# Patient Record
Sex: MALE | Race: WHITE | NOT HISPANIC OR LATINO | Employment: OTHER | URBAN - NONMETROPOLITAN AREA
[De-identification: names, ages, dates, MRNs, and addresses within clinical notes are randomized per-mention and may not be internally consistent; named-entity substitution may affect disease eponyms.]

---

## 2017-01-19 ENCOUNTER — AMBULATORY - GICH (OUTPATIENT)
Dept: LAB | Facility: OTHER | Age: 62
End: 2017-01-19

## 2017-01-19 DIAGNOSIS — Z94.2 HISTORY OF LUNG TRANSPLANT (H): ICD-10-CM

## 2017-01-19 DIAGNOSIS — Z94.2 LUNG REPLACED BY TRANSPLANT (H): ICD-10-CM

## 2017-01-19 LAB
ANION GAP - HISTORICAL: 11 (ref 5–18)
BUN SERPL-MCNC: 27 MG/DL (ref 7–25)
BUN/CREAT RATIO - HISTORICAL: 26
CALCIUM SERPL-MCNC: 9.5 MG/DL (ref 8.6–10.3)
CHLORIDE SERPLBLD-SCNC: 104 MMOL/L (ref 98–107)
CO2 SERPL-SCNC: 26 MMOL/L (ref 21–31)
CREAT SERPL-MCNC: 1.04 MG/DL (ref 0.7–1.3)
ERYTHROCYTE [DISTWIDTH] IN BLOOD BY AUTOMATED COUNT: 12.9 % (ref 11.5–15.5)
GFR IF NOT AFRICAN AMERICAN - HISTORICAL: >60 ML/MIN/1.73M2
GLUCOSE SERPL-MCNC: 123 MG/DL (ref 70–105)
HCT VFR BLD AUTO: 36.9 % (ref 37–53)
HEMOGLOBIN: 12.6 G/DL (ref 13.5–17.5)
MAGNESIUM SERPL-MCNC: 1.6 MG/DL (ref 1.9–2.7)
MCH RBC QN AUTO: 33.3 PG (ref 26–34)
MCHC RBC AUTO-ENTMCNC: 34.2 G/DL (ref 32–36)
MCV RBC AUTO: 97 FL (ref 80–100)
PLATELET # BLD AUTO: 187 THOU/CU MM (ref 140–440)
PMV BLD: 7.3 FL (ref 6.5–11)
POTASSIUM SERPL-SCNC: 4.2 MMOL/L (ref 3.5–5.1)
RED BLOOD COUNT - HISTORICAL: 3.8 MIL/CU MM (ref 4.3–5.9)
SODIUM SERPL-SCNC: 141 MMOL/L (ref 133–143)
WHITE BLOOD COUNT - HISTORICAL: 3.6 THOU/CU MM (ref 4.5–11)

## 2017-01-19 PROCEDURE — 80197 ASSAY OF TACROLIMUS: CPT | Performed by: INTERNAL MEDICINE

## 2017-01-22 LAB
TACROLIMUS BLD-MCNC: 8.5 UG/L (ref 5–15)
TME LAST DOSE: NORMAL H

## 2017-01-23 NOTE — PROGRESS NOTES
Quick Note:    Dieudonne, the prograf level is 8.5 and is slightly below goal range of 10-12ug/L. Please recheck level in 10 days. Thank you . Jumana  ______

## 2017-01-30 ENCOUNTER — AMBULATORY - GICH (OUTPATIENT)
Dept: LAB | Facility: OTHER | Age: 62
End: 2017-01-30

## 2017-01-30 DIAGNOSIS — Z94.2 HISTORY OF LUNG TRANSPLANT (H): ICD-10-CM

## 2017-01-30 LAB
ERYTHROCYTE [DISTWIDTH] IN BLOOD BY AUTOMATED COUNT: 12.6 % (ref 11.5–15.5)
HCT VFR BLD AUTO: 38 % (ref 37–53)
HEMOGLOBIN: 12.5 G/DL (ref 13.5–17.5)
MCH RBC QN AUTO: 33.2 PG (ref 26–34)
MCHC RBC AUTO-ENTMCNC: 32.8 G/DL (ref 32–36)
MCV RBC AUTO: 101 FL (ref 80–100)
PLATELET # BLD AUTO: 168 THOU/CU MM (ref 140–440)
PMV BLD: 7.3 FL (ref 6.5–11)
RED BLOOD COUNT - HISTORICAL: 3.75 MIL/CU MM (ref 4.3–5.9)
WHITE BLOOD COUNT - HISTORICAL: 3.6 THOU/CU MM (ref 4.5–11)

## 2017-01-30 PROCEDURE — 80197 ASSAY OF TACROLIMUS: CPT | Performed by: PATHOLOGY

## 2017-02-01 LAB
TACROLIMUS BLD-MCNC: 9.5 UG/L (ref 5–15)
TME LAST DOSE: NORMAL H

## 2017-02-02 ENCOUNTER — TELEPHONE (OUTPATIENT)
Dept: TRANSPLANT | Facility: CLINIC | Age: 62
End: 2017-02-02

## 2017-02-02 DIAGNOSIS — Z79.899 ENCOUNTER FOR LONG-TERM (CURRENT) USE OF MEDICATIONS: ICD-10-CM

## 2017-02-02 DIAGNOSIS — Z94.2 LUNG REPLACED BY TRANSPLANT (H): Primary | ICD-10-CM

## 2017-02-02 DIAGNOSIS — Z94.2 LUNG TRANSPLANT STATUS, BILATERAL (H): Primary | ICD-10-CM

## 2017-02-02 RX ORDER — TACROLIMUS 1 MG/1
CAPSULE ORAL
Qty: 180 CAPSULE | Refills: 12 | Status: SHIPPED | OUTPATIENT
Start: 2017-02-02 | End: 2018-04-23

## 2017-02-02 NOTE — TELEPHONE ENCOUNTER
Tacrolimus level 9.5 at 12 hours, on 1/30  Goal 10-12.   Current dose 3 mg in AM, 2.5 mg in PM    Dose changed to  3 mg in AM, 3 mg in PM   Recheck level in 7-10 days    Discussed with Aubrey Urbano message sent

## 2017-02-03 DIAGNOSIS — Z94.2 LUNG REPLACED BY TRANSPLANT (H): Primary | ICD-10-CM

## 2017-02-03 RX ORDER — AZATHIOPRINE 50 MG/1
25 TABLET ORAL DAILY
Qty: 15 TABLET | Refills: 11 | Status: SHIPPED
Start: 2017-02-03 | End: 2017-12-20

## 2017-02-03 NOTE — TELEPHONE ENCOUNTER
Drug Name: azathioprine 50mg  Last Fill Date: 1/11/17  Quantity: 15    Savanah Richey   [facility]  [phone]

## 2017-02-13 ENCOUNTER — AMBULATORY - GICH (OUTPATIENT)
Dept: LAB | Facility: OTHER | Age: 62
End: 2017-02-13

## 2017-02-13 DIAGNOSIS — Z79.899 ENCOUNTER FOR LONG-TERM (CURRENT) USE OF MEDICATIONS: ICD-10-CM

## 2017-02-13 DIAGNOSIS — Z94.2 LUNG REPLACED BY TRANSPLANT (H): ICD-10-CM

## 2017-02-13 DIAGNOSIS — Z94.2 HISTORY OF LUNG TRANSPLANT (H): ICD-10-CM

## 2017-02-13 PROCEDURE — 80197 ASSAY OF TACROLIMUS: CPT | Performed by: INTERNAL MEDICINE

## 2017-02-15 LAB
TACROLIMUS BLD-MCNC: 10.5 UG/L (ref 5–15)
TME LAST DOSE: NORMAL H

## 2017-02-16 NOTE — PROGRESS NOTES
Aubrey, your prograf level is 10.5 and is within goal range.  No dose changes for now.  Call me with questions.  Jumana

## 2017-03-27 DIAGNOSIS — Z94.2 LUNG TRANSPLANT STATUS, BILATERAL (H): ICD-10-CM

## 2017-03-27 RX ORDER — PREDNISONE 5 MG/1
TABLET ORAL
Qty: 45 TABLET | Refills: 12 | Status: SHIPPED | OUTPATIENT
Start: 2017-03-27 | End: 2018-09-11

## 2017-04-05 ASSESSMENT — ENCOUNTER SYMPTOMS
NAUSEA: 0
DIARRHEA: 1
RECTAL BLEEDING: 0
POOR WOUND HEALING: 0
BLOATING: 0
SKIN CHANGES: 0
HEARTBURN: 0
BLOOD IN STOOL: 0
VOMITING: 0
JAUNDICE: 0
NAIL CHANGES: 0
BOWEL INCONTINENCE: 0
CONSTIPATION: 0
RECTAL PAIN: 0
ABDOMINAL PAIN: 0

## 2017-04-19 ENCOUNTER — RESULTS ONLY (OUTPATIENT)
Dept: OTHER | Facility: CLINIC | Age: 62
End: 2017-04-19

## 2017-04-19 ENCOUNTER — OFFICE VISIT (OUTPATIENT)
Dept: DERMATOLOGY | Facility: CLINIC | Age: 62
End: 2017-04-19

## 2017-04-19 ENCOUNTER — OFFICE VISIT (OUTPATIENT)
Dept: PULMONOLOGY | Facility: CLINIC | Age: 62
End: 2017-04-19
Attending: INTERNAL MEDICINE
Payer: MEDICARE

## 2017-04-19 ENCOUNTER — AMBULATORY - GICH (OUTPATIENT)
Dept: SCHEDULING | Facility: OTHER | Age: 62
End: 2017-04-19

## 2017-04-19 VITALS
SYSTOLIC BLOOD PRESSURE: 162 MMHG | OXYGEN SATURATION: 97 % | WEIGHT: 165 LBS | HEIGHT: 68 IN | HEART RATE: 97 BPM | BODY MASS INDEX: 25.01 KG/M2 | DIASTOLIC BLOOD PRESSURE: 90 MMHG

## 2017-04-19 DIAGNOSIS — L82.1 SEBORRHEIC KERATOSIS: ICD-10-CM

## 2017-04-19 DIAGNOSIS — E88.01 ALPHA-1-ANTITRYPSIN DEFICIENCY (H): Primary | ICD-10-CM

## 2017-04-19 DIAGNOSIS — Z94.2 LUNG REPLACED BY TRANSPLANT (H): ICD-10-CM

## 2017-04-19 DIAGNOSIS — Z79.899 ENCOUNTER FOR LONG-TERM (CURRENT) USE OF MEDICATIONS: ICD-10-CM

## 2017-04-19 DIAGNOSIS — L81.8 STASIS PIGMENTATION OF LOWER EXTREMITY: ICD-10-CM

## 2017-04-19 DIAGNOSIS — Z94.2 LUNG REPLACED BY TRANSPLANT (H): Primary | ICD-10-CM

## 2017-04-19 DIAGNOSIS — D22.9 MULTIPLE NEVI: ICD-10-CM

## 2017-04-19 DIAGNOSIS — E83.51 HYPOCALCEMIA: ICD-10-CM

## 2017-04-19 DIAGNOSIS — L57.0 AK (ACTINIC KERATOSIS): Primary | ICD-10-CM

## 2017-04-19 DIAGNOSIS — Z79.52 LONG TERM CURRENT USE OF SYSTEMIC STEROIDS: ICD-10-CM

## 2017-04-19 DIAGNOSIS — Z85.828 HISTORY OF SCC (SQUAMOUS CELL CARCINOMA) OF SKIN: ICD-10-CM

## 2017-04-19 LAB
ANION GAP SERPL CALCULATED.3IONS-SCNC: 7 MMOL/L (ref 3–14)
BASOPHILS # BLD AUTO: 0.1 10E9/L (ref 0–0.2)
BASOPHILS NFR BLD AUTO: 1.8 %
BUN SERPL-MCNC: 28 MG/DL (ref 7–30)
CALCIUM SERPL-MCNC: 8.8 MG/DL (ref 8.5–10.1)
CHLORIDE SERPL-SCNC: 106 MMOL/L (ref 94–109)
CO2 SERPL-SCNC: 29 MMOL/L (ref 20–32)
CREAT SERPL-MCNC: 0.98 MG/DL (ref 0.66–1.25)
DIFFERENTIAL METHOD BLD: ABNORMAL
EOSINOPHIL # BLD AUTO: 0.1 10E9/L (ref 0–0.7)
EOSINOPHIL NFR BLD AUTO: 3.7 %
ERYTHROCYTE [DISTWIDTH] IN BLOOD BY AUTOMATED COUNT: 12.9 % (ref 10–15)
EXPTIME-PRE: 6.93 SEC
FEF2575-%PRED-PRE: 127 %
FEF2575-PRE: 3.36 L/SEC
FEF2575-PRED: 2.64 L/SEC
FEFMAX-%PRED-PRE: 109 %
FEFMAX-PRE: 9.25 L/SEC
FEFMAX-PRED: 8.48 L/SEC
FEV1-%PRED-PRE: 105 %
FEV1-PRE: 3.42 L
FEV1FEV6-PRE: 82 %
FEV1FEV6-PRED: 79 %
FEV1FVC-PRE: 82 %
FEV1FVC-PRED: 77 %
FIFMAX-PRE: 7.29 L/SEC
FVC-%PRED-PRE: 99 %
FVC-PRE: 4.17 L
FVC-PRED: 4.19 L
GFR SERPL CREATININE-BSD FRML MDRD: 77 ML/MIN/1.7M2
GLUCOSE SERPL-MCNC: 155 MG/DL (ref 70–99)
HCT VFR BLD AUTO: 40.5 % (ref 40–53)
HGB BLD-MCNC: 12.9 G/DL (ref 13.3–17.7)
LYMPHOCYTES # BLD AUTO: 1.9 10E9/L (ref 0.8–5.3)
LYMPHOCYTES NFR BLD AUTO: 50.6 %
MAGNESIUM SERPL-MCNC: 1.9 MG/DL (ref 1.6–2.3)
MCH RBC QN AUTO: 32.7 PG (ref 26.5–33)
MCHC RBC AUTO-ENTMCNC: 31.9 G/DL (ref 31.5–36.5)
MCV RBC AUTO: 103 FL (ref 78–100)
METAMYELOCYTES # BLD: 0.1 10E9/L
METAMYELOCYTES NFR BLD MANUAL: 1.8 %
MONOCYTES # BLD AUTO: 0.3 10E9/L (ref 0–1.3)
MONOCYTES NFR BLD AUTO: 7.3 %
MYELOCYTES # BLD: 0 10E9/L
MYELOCYTES NFR BLD MANUAL: 0.9 %
NEUTROPHILS # BLD AUTO: 1.3 10E9/L (ref 1.6–8.3)
NEUTROPHILS NFR BLD AUTO: 33.9 %
PHOSPHATE SERPL-MCNC: 3.3 MG/DL (ref 2.5–4.5)
PLATELET # BLD AUTO: 161 10E9/L (ref 150–450)
POTASSIUM SERPL-SCNC: 4.5 MMOL/L (ref 3.4–5.3)
RBC # BLD AUTO: 3.95 10E12/L (ref 4.4–5.9)
RBC MORPH BLD: NORMAL
SODIUM SERPL-SCNC: 142 MMOL/L (ref 133–144)
TACROLIMUS BLD-MCNC: 9.5 UG/L (ref 5–15)
TME LAST DOSE: NORMAL H
WBC # BLD AUTO: 3.7 10E9/L (ref 4–11)

## 2017-04-19 PROCEDURE — 80048 BASIC METABOLIC PNL TOTAL CA: CPT | Performed by: INTERNAL MEDICINE

## 2017-04-19 PROCEDURE — 84100 ASSAY OF PHOSPHORUS: CPT | Performed by: INTERNAL MEDICINE

## 2017-04-19 PROCEDURE — 80197 ASSAY OF TACROLIMUS: CPT | Performed by: INTERNAL MEDICINE

## 2017-04-19 PROCEDURE — 86833 HLA CLASS II HIGH DEFIN QUAL: CPT | Performed by: INTERNAL MEDICINE

## 2017-04-19 PROCEDURE — 86832 HLA CLASS I HIGH DEFIN QUAL: CPT | Performed by: INTERNAL MEDICINE

## 2017-04-19 PROCEDURE — 85025 COMPLETE CBC W/AUTO DIFF WBC: CPT | Performed by: INTERNAL MEDICINE

## 2017-04-19 PROCEDURE — 36415 COLL VENOUS BLD VENIPUNCTURE: CPT | Performed by: INTERNAL MEDICINE

## 2017-04-19 PROCEDURE — 99211 OFF/OP EST MAY X REQ PHY/QHP: CPT | Mod: ZF

## 2017-04-19 PROCEDURE — 83735 ASSAY OF MAGNESIUM: CPT | Performed by: INTERNAL MEDICINE

## 2017-04-19 ASSESSMENT — PAIN SCALES - GENERAL
PAINLEVEL: NO PAIN (1)
PAINLEVEL: NO PAIN (0)
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
## 2017-04-19 NOTE — PATIENT INSTRUCTIONS
Patient Instructions  1. Continue daily exercise  2. Remember to use sunscreen (SPF 50) with any sun exposure  3. Yearly dermatology check up  4. Call Jumana after antibiotics done if diarrhea has not improved  5. Check on physical therapy for your back tightness    Next transplant clinic appointment: 6 months with CXR, labs and PFTs including annual studies  Next lab draw: 3 months    AVS printed at time of check out    ~~~~~~~~~~~~~~~~~~~~~~~~~    Thoracic Transplant Office phone [phone], fax [phone]  Office Hours 8:30 - 5:00     For after-hours urgent issues, please dial [phone], and ask to speak with the Thoracic Transplant Coordinator  On-Call, pager [phone].  --------------------  To expedite your medication refill(s), please contact your pharmacy and have them fax a refill request to: [phone].   *Please allow 3 business days for routine medication refills.  *Please allow 5 business days for controlled substance medication refills.    **For Diabetic medications and supplies refill(s), please contact your pharmacy and have them  Contact your Endocrine team.  --------------------  For scheduling appointments call [facility] :  [phone]. For lab appointments call [phone] or [facility].  --------------------  Please Note: If you are active on [employer], all future test results will be sent by [employer] message only, and will no longer be called to patient. You may also receive communication directly from your physician.
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
## 2017-04-19 NOTE — NURSING NOTE
Tacro level 9.5 and at goal range and results reviewed with patient.    PFTs down slightly and per Dr. Broussard will have patient obtain volume loop PFT in 3 months. Mailed orders to patient and he is in agreement with plan of care.

## 2017-04-19 NOTE — NURSING NOTE
Chief Complaint   Patient presents with     Derm Problem     Aubrey is here for a skin cancer exam. He states that he does have some concerns across the forehead as the lesions are tender.      Sherita Sutherland, CMA

## 2017-04-19 NOTE — MR AVS SNAPSHOT
After Visit Summary   4/19/2017    Aubrey Duncan    MRN: [id number]           Patient Information     Date Of Birth          1955        Visit Information        Provider Department      4/19/2017 9:20 AM Kirk Broussard MD [facility]        Care Instructions    Patient Instructions  1. Continue daily exercise  2. Remember to use sunscreen (SPF 50) with any sun exposure  3. Yearly dermatology check up  4. Call [facility] after antibiotics done if diarrhea has not improved  5. Check on physical therapy for your back tightness    Next transplant clinic appointment: 6 months with CXR, labs and PFTs including annual studies  Next lab draw: 3 months    AVS printed at time of check out    ~~~~~~~~~~~~~~~~~~~~~~~~~    Thoracic Transplant Office phone [phone], fax [phone]  Office Hours 8:30 - 5:00     For after-hours urgent issues, please dial [phone], and ask to speak with the Thoracic Transplant Coordinator  On-Call, pager [phone].  --------------------  To expedite your medication refill(s), please contact your pharmacy and have them fax a refill request to: [phone].   *Please allow 3 business days for routine medication refills.  *Please allow 5 business days for controlled substance medication refills.    **For Diabetic medications and supplies refill(s), please contact your pharmacy and have them  Contact your Endocrine team.  --------------------  For scheduling appointments call [facility] :  [phone]. For lab appointments call [phone] or [facility].  --------------------  Please Note: If you are active on [employer], all future test results will be sent by [employer] message only, and will no longer be called to patient. You may also receive communication directly from your physician.        Follow-ups after your visit        Who to contact     If you have questions or need follow up information about today's clinic visit or your  "schedule please contact [facility] directly at [phone].  Normal or non-critical lab and imaging results will be communicated to you by [employer], letter or phone within 4 business days after the clinic has received the results. If you do not hear from us within 7 days, please contact the clinic through [employer] or phone. If you have a critical or abnormal lab result, we will notify you by phone as soon as possible.  Submit refill requests through [employer] or call your pharmacy and they will forward the refill request to us. Please allow 3 business days for your refill to be completed.          Additional Information About Your Visit        [employer] Information     [employer] gives you secure access to your electronic health record. If you see a primary care provider, you can also send messages to your care team and make appointments. If you have questions, please call your primary care clinic.  If you do not have a primary care provider, please call [phone] and they will assist you.        Care EveryWhere ID     This is your Care EveryWhere ID. This could be used by other organizations to access your [facility] medical records  [id number]        Your Vitals Were     Pulse Height Pulse Oximetry BMI (Body Mass Index)          97 1.727 m (5' 8\") 97% 25.09 kg/m2         Blood Pressure from Last 3 Encounters:   04/19/17 162/90   10/19/16 155/79   06/30/16 138/72    Weight from Last 3 Encounters:   04/19/17 74.8 kg (165 lb)   10/19/16 78.9 kg (174 lb)   06/14/16 79.2 kg (174 lb 8 oz)              Today, you had the following     No orders found for display         Today's Medication Changes          These changes are accurate as of: 4/19/17 10:18 AM.  If you have any questions, ask your nurse or doctor.               These medicines have changed or have updated prescriptions.        Dose/Directions    predniSONE 5 MG tablet   Commonly known as:  DELTASONE   This may have changed:  " Another medication with the same name was removed. Continue taking this medication, and follow the directions you see here.   Used for:  Lung transplant status, bilateral (H)   Changed by:  Kirk Broussard MD        TAKE ONE TABLET BY MOUTH EVERY MORNING AND TAKE ONE-HALF TABLET BY MOUTH EVERY EVENING   Quantity:  45 tablet   Refills:  12                Primary Care Provider Office Phone # Fax #    Raeann Thomas [phone]       [facility] [address]  [place] [address]        Thank you!     Thank you for choosing [facility]  for your care. Our goal is always to provide you with excellent care. Hearing back from our patients is one way we can continue to improve our services. Please take a few minutes to complete the written survey that you may receive in the mail after your visit with us. Thank you!             Your Updated Medication List - Protect others around you: Learn how to safely use, store and throw away your medicines at www.disposemymeds.org.          This list is accurate as of: 4/19/17 10:18 AM.  Always use your most recent med list.                   Brand Name Dispense Instructions for use    albuterol 108 (90 BASE) MCG/ACT Inhaler    PROAIR HFA/PROVENTIL HFA/VENTOLIN HFA    3 Inhaler    Inhale 2 puffs into the lungs every 6 hours as needed for shortness of breath / dyspnea or wheezing       alendronate 70 MG tablet    FOSAMAX    4 tablet    Take 1 tablet (70 mg) by mouth every 7 days Take with 8 ounces water, at least 60 min before breakfast, and stay upright for at least 30 min after dose.       azaTHIOprine 50 MG tablet    IMURAN    15 tablet    Take 0.5 tablets (25 mg) by mouth daily       CALCIUM 600-D 600-400 MG-UNIT per tablet   Generic drug:  calcium-vitamin D      Take 1 tablet by mouth       calcium-vitamin D 600-400 MG-UNIT per tablet    CALTRATE    60 tablet    Take 1 tablet by mouth 2 times daily (with  meals)       furosemide 20 MG tablet    LASIX    30 tablet    TAKE ONE TABLET BY MOUTH EVERY DAY       lisinopril 2.5 MG tablet    PRINIVIL/Zestril    30 tablet    TAKE ONE TABLET BY MOUTH EVERY DAY       loperamide 2 MG capsule    IMODIUM    120 capsule    Take 1 capsule (2 mg) by mouth 4 times daily as needed for diarrhea       metoprolol 25 MG tablet    LOPRESSOR    60 tablet    TAKE ONE TABLET BY MOUTH TWICE A DAY       multivitamin, therapeutic Tabs tablet     30 tablet    Take 1 tablet by mouth daily       omeprazole 20 MG CR capsule    priLOSEC    60 capsule    Take 1 capsule (20 mg) by mouth 2 times daily (before meals)       predniSONE 5 MG tablet    DELTASONE    45 tablet    TAKE ONE TABLET BY MOUTH EVERY MORNING AND TAKE ONE-HALF TABLET BY MOUTH EVERY EVENING       sildenafil 25 MG cap/tab    REVATIO/VIAGRA    30 tablet    Take 1 tablet (25 mg) by mouth as needed for erectile dysfunction       sulfamethoxazole-trimethoprim 400-80 MG per tablet    BACTRIM/SEPTRA    12 tablet    TAKE ONE TABLET BY MOUTH THREE TIMES A WEEK       tacrolimus 1 MG capsule    PROGRAF - GENERIC EQUIVALENT    180 capsule    Take 3 caps every am and 3 mg every pm.       valGANciclovir 450 MG tablet    VALCYTE    60 tablet    TAKE ONE TABLETS (450MG) BY MOUTH TWO TIMES A DAY
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
## 2017-04-19 NOTE — NURSING NOTE
Chief Complaint   Patient presents with     Follow Up For     Patient here for: post Lung TX- 6 month return visit    Charlotte Goldman at 9:24 AM on 4/19/2017

## 2017-04-19 NOTE — NURSING NOTE
Transplant Coordinator Note    Reason for visit: Post lung transplant follow up visit   Coordinator: Present   Caregiver: none present    Health concerns addressed today:  1. No issues with gout since stopping allopurinal  2. Occasional left leg cramping - treats with spoonful of mustard  3. Labs are stable  4.Left tooth abscess and being followed by dental, are treating with antibiotics    Activity: ad aviva, walks daily  Oxygen needs: room air, PFTs decreased - repeat PFT in 3 months  Health Maintenance: vaccinations are up to date      Labs, CXR, PFTs reviewed with patient  Medication record reviewed and reconciled  Questions and concerns addressed    Patient Instructions  1. Continue daily exercise  2. Remember to use sunscreen (SPF 50) with any sun exposure  3. Yearly dermatology check up  4. Call [facility] after antibiotics done if diarrhea has not improved  5. Check on physical therapy for your back tightness    Next transplant clinic appointment: 6 months with CXR, labs and PFTs including annual studies  Next lab draw: 3 months    AVS printed at time of check out
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
## 2017-04-19 NOTE — MR AVS SNAPSHOT
After Visit Summary   4/19/2017    Aubrey Duncan    MRN: [id number]           Patient Information     Date Of Birth          1955        Visit Information        Provider Department      4/19/2017 10:15 AM Petrona Olmos MD [facility] Dermatologic Surgery        Today's Diagnoses     AK (actinic keratosis)    -  1    History of SCC (squamous cell carcinoma) of skin        Multiple nevi        Seborrheic keratosis        Stasis pigmentation of lower extremity           Follow-ups after your visit        Follow-up notes from your care team     Return in about 6 months (around 10/19/2017).      Your next 10 appointments already scheduled     Oct 19, 2017  8:00 AM CDT   Lab with  LAB   [facility] Lab ([facility])    [address] [place] [id number] [phone]            Oct 19, 2017  8:15 AM CDT   (Arrive by 8:00 AM)   XR CHEST 2 VIEWS with XR1   [facility] Xray ([facility])    [address] [place] [address] [phone]           Please bring a list of your current medicines to your exam. (Include vitamins, minerals and over-thecounter medicines.) Leave your valuables at home.  Tell your doctor if there is a chance you may be pregnant.  You do not need to do anything special for this exam.            Oct 19, 2017  8:30 AM CDT   DX HIP/PELVIS/SPINE with UCDX1   [facility]a ([facility])    [address] [place] [address] [phone]           Please do not take any of the following 48 hours prior to your exam: vitamins, calcium tablets, antacids.            Oct 19, 2017  9:00 AM CDT   SIX MINUTE WALK with  PFL A,  PFL 6 MINUTE WALK 2   [facility] Pulmonary Function Testing ([facility])    [address] [place] [address] [phone]            Oct  19, 2017 10:20 AM CDT   (Arrive by 10:05 AM)   Return Lung Transplant with Kirk Broussard MD   [facility] ([facility])    [address] [address] [phone]              Who to contact     Please call your clinic at [phone] to:    Ask questions about your health    Make or cancel appointments    Discuss your medicines    Learn about your test results    Speak to your doctor   If you have compliments or concerns about an experience at your clinic, or if you wish to file a complaint, please contact [facility] Patient Relations at [phone] or email us at [email or web address]         Additional Information About Your Visit        [employer] Information     [employer] gives you secure access to your electronic health record. If you see a primary care provider, you can also send messages to your care team and make appointments. If you have questions, please call your primary care clinic.  If you do not have a primary care provider, please call [phone] and they will assist you.      [employer] is an electronic gateway that provides easy, online access to your medical records. With [employer], you can request a clinic appointment, read your test results, renew a prescription or communicate with your care team.     To access your existing account, please contact your [facility] or call [phone] for assistance.        Care EveryWhere ID     This is your Care EveryWhere ID. This could be used by other organizations to access your [place] medical records  [id number]         Blood Pressure from Last 3 Encounters:   04/19/17 162/90   10/19/16 155/79   06/30/16 138/72    Weight from Last 3 Encounters:   04/19/17 74.8 kg (165 lb)   10/19/16 78.9 kg (174 lb)   06/14/16 79.2 kg (174 lb 8 oz)              We Performed the Following     DESTRUCT  PREMALIGNANT LESION, 2-14     DESTRUCT PREMALIGNANT LESION, FIRST          Today's Medication Changes          These changes are accurate as of: 4/19/17 11:59 PM.  If you have any questions, ask your nurse or doctor.               These medicines have changed or have updated prescriptions.        Dose/Directions    predniSONE 5 MG tablet   Commonly known as:  DELTASONE   This may have changed:  Another medication with the same name was removed. Continue taking this medication, and follow the directions you see here.   Used for:  Lung transplant status, bilateral (H)   Changed by:  Kirk Broussard MD        TAKE ONE TABLET BY MOUTH EVERY MORNING AND TAKE ONE-HALF TABLET BY MOUTH EVERY EVENING   Quantity:  45 tablet   Refills:  12                Primary Care Provider Office Phone # Fax #    Raeann Thomas [phone]       [facility] [address]  [place] [address]        Thank you!     Thank you for choosing [facility] DERMATOLOGIC SURGERY  for your care. Our goal is always to provide you with excellent care. Hearing back from our patients is one way we can continue to improve our services. Please take a few minutes to complete the written survey that you may receive in the mail after your visit with us. Thank you!             Your Updated Medication List - Protect others around you: Learn how to safely use, store and throw away your medicines at www.disposemymeds.org.          This list is accurate as of: 4/19/17 11:59 PM.  Always use your most recent med list.                   Brand Name Dispense Instructions for use    albuterol 108 (90 BASE) MCG/ACT Inhaler    PROAIR HFA/PROVENTIL HFA/VENTOLIN HFA    3 Inhaler    Inhale 2 puffs into the lungs every 6 hours as needed for shortness of breath / dyspnea or wheezing       alendronate 70 MG tablet    FOSAMAX    4 tablet    Take 1 tablet (70 mg) by mouth every 7 days Take with 8 ounces water, at least 60 min before  breakfast, and stay upright for at least 30 min after dose.       azaTHIOprine 50 MG tablet    IMURAN    15 tablet    Take 0.5 tablets (25 mg) by mouth daily       CALCIUM 600-D 600-400 MG-UNIT per tablet   Generic drug:  calcium-vitamin D      Take 1 tablet by mouth       calcium-vitamin D 600-400 MG-UNIT per tablet    CALTRATE    60 tablet    Take 1 tablet by mouth 2 times daily (with meals)       furosemide 20 MG tablet    LASIX    30 tablet    TAKE ONE TABLET BY MOUTH EVERY DAY       lisinopril 2.5 MG tablet    PRINIVIL/Zestril    30 tablet    TAKE ONE TABLET BY MOUTH EVERY DAY       loperamide 2 MG capsule    IMODIUM    120 capsule    Take 1 capsule (2 mg) by mouth 4 times daily as needed for diarrhea       metoprolol 25 MG tablet    LOPRESSOR    60 tablet    TAKE ONE TABLET BY MOUTH TWICE A DAY       multivitamin, therapeutic Tabs tablet     30 tablet    Take 1 tablet by mouth daily       omeprazole 20 MG CR capsule    priLOSEC    60 capsule    Take 1 capsule (20 mg) by mouth 2 times daily (before meals)       predniSONE 5 MG tablet    DELTASONE    45 tablet    TAKE ONE TABLET BY MOUTH EVERY MORNING AND TAKE ONE-HALF TABLET BY MOUTH EVERY EVENING       sildenafil 25 MG cap/tab    REVATIO/VIAGRA    30 tablet    Take 1 tablet (25 mg) by mouth as needed for erectile dysfunction       sulfamethoxazole-trimethoprim 400-80 MG per tablet    BACTRIM/SEPTRA    12 tablet    TAKE ONE TABLET BY MOUTH THREE TIMES A WEEK       tacrolimus 1 MG capsule    PROGRAF - GENERIC EQUIVALENT    180 capsule    Take 3 caps every am and 3 mg every pm.       valGANciclovir 450 MG tablet    VALCYTE    60 tablet    TAKE ONE TABLETS (450MG) BY MOUTH TWO TIMES A DAY

## 2017-04-20 LAB
CMV DNA SPEC NAA+PROBE-ACNC: NORMAL [IU]/ML
CMV DNA SPEC NAA+PROBE-LOG#: NORMAL {LOG_IU}/ML
PRA DONOR SPECIFIC ABY: NORMAL
SPECIMEN SOURCE: NORMAL

## 2017-04-21 ENCOUNTER — TELEPHONE (OUTPATIENT)
Dept: TRANSPLANT | Facility: CLINIC | Age: 62
End: 2017-04-21

## 2017-04-24 ENCOUNTER — COMMUNICATION - GICH (OUTPATIENT)
Dept: FAMILY MEDICINE | Facility: OTHER | Age: 62
End: 2017-04-24

## 2017-04-24 DIAGNOSIS — K21.9 GASTRO-ESOPHAGEAL REFLUX DISEASE WITHOUT ESOPHAGITIS: ICD-10-CM

## 2017-04-25 LAB
DONOR IDENTIFICATION: NORMAL
DSA COMMENTS: NORMAL
DSA PRESENT: NO
DSA TEST METHOD: NORMAL
ORGAN: NORMAL
SA1 CELL: NORMAL
SA1 COMMENTS: NORMAL
SA1 HI RISK ABY: NORMAL
SA1 MOD RISK ABY: NORMAL
SA1 TEST METHOD: NORMAL
SA2 CELL: NORMAL
SA2 COMMENTS: NORMAL
SA2 HI RISK ABY UA: NORMAL
SA2 MOD RISK ABY: NORMAL
SA2 TEST METHOD: NORMAL

## 2017-05-11 ENCOUNTER — HISTORY (OUTPATIENT)
Dept: PEDIATRICS | Facility: OTHER | Age: 62
End: 2017-05-11

## 2017-05-11 ENCOUNTER — OFFICE VISIT - GICH (OUTPATIENT)
Dept: PEDIATRICS | Facility: OTHER | Age: 62
End: 2017-05-11

## 2017-05-11 DIAGNOSIS — Z76.89 PERSONS ENCOUNTERING HEALTH SERVICES IN OTHER SPECIFIED CIRCUMSTANCES: ICD-10-CM

## 2017-05-11 DIAGNOSIS — Z94.2 HISTORY OF LUNG TRANSPLANT (H): ICD-10-CM

## 2017-05-11 DIAGNOSIS — E78.2 MIXED HYPERLIPIDEMIA: ICD-10-CM

## 2017-05-11 DIAGNOSIS — M85.80 OTHER SPECIFIED DISORDERS OF BONE DENSITY AND STRUCTURE, UNSPECIFIED SITE (CODE): ICD-10-CM

## 2017-05-11 DIAGNOSIS — E88.01 ALPHA-1-ANTITRYPSIN DEFICIENCY (H): ICD-10-CM

## 2017-05-11 DIAGNOSIS — E11.9 TYPE 2 DIABETES MELLITUS WITHOUT COMPLICATIONS (H): ICD-10-CM

## 2017-05-11 DIAGNOSIS — I10 ESSENTIAL (PRIMARY) HYPERTENSION: ICD-10-CM

## 2017-06-08 ENCOUNTER — AMBULATORY - GICH (OUTPATIENT)
Dept: LAB | Facility: OTHER | Age: 62
End: 2017-06-08

## 2017-06-08 DIAGNOSIS — E11.9 TYPE 2 DIABETES MELLITUS WITHOUT COMPLICATIONS (H): ICD-10-CM

## 2017-06-08 DIAGNOSIS — I10 ESSENTIAL (PRIMARY) HYPERTENSION: ICD-10-CM

## 2017-06-08 DIAGNOSIS — E78.2 MIXED HYPERLIPIDEMIA: ICD-10-CM

## 2017-06-08 LAB
ABSOLUTE BASOPHILS - HISTORICAL: 0.1 THOU/CU MM
ABSOLUTE EOSINOPHILS - HISTORICAL: 0.2 THOU/CU MM
ABSOLUTE LYMPHOCYTES - HISTORICAL: 1.9 THOU/CU MM (ref 0.9–2.9)
ABSOLUTE MONOCYTES - HISTORICAL: 0.3 THOU/CU MM
ABSOLUTE NEUTROPHILS - HISTORICAL: 1.1 THOU/CU MM (ref 1.7–7)
BASOPHILS # BLD AUTO: 1.4 %
CHOL/HDL RATIO - HISTORICAL: 5.21
CHOLESTEROL TOTAL: 203 MG/DL
EOSINOPHIL NFR BLD AUTO: 5.1 %
ERYTHROCYTE [DISTWIDTH] IN BLOOD BY AUTOMATED COUNT: 13.1 % (ref 11.5–15.5)
ESTIMATED AVERAGE GLUCOSE: 148 MG/DL
HCT VFR BLD AUTO: 37 % (ref 37–53)
HDLC SERPL-MCNC: 39 MG/DL (ref 23–92)
HEMOGLOBIN A1C MONITORING (POCT) - HISTORICAL: 6.8 % (ref 4–6.2)
HEMOGLOBIN: 12.7 G/DL (ref 13.5–17.5)
LDLC SERPL CALC-MCNC: 94 MG/DL
LYMPHOCYTES NFR BLD AUTO: 52.2 % (ref 20–44)
MCH RBC QN AUTO: 34.5 PG (ref 26–34)
MCHC RBC AUTO-ENTMCNC: 34.3 G/DL (ref 32–36)
MCV RBC AUTO: 101 FL (ref 80–100)
MONOCYTES NFR BLD AUTO: 9 %
NEUTROPHILS NFR BLD AUTO: 31.2 % (ref 42–72)
NON-HDL CHOLESTEROL - HISTORICAL: 164 MG/DL
PATIENT STATUS - HISTORICAL: ABNORMAL
PLATELET # BLD AUTO: 162 THOU/CU MM (ref 140–440)
PMV BLD: 9.8 FL (ref 6.5–11)
RED BLOOD COUNT - HISTORICAL: 3.68 MIL/CU MM (ref 4.3–5.9)
TRIGL SERPL-MCNC: 350 MG/DL
WHITE BLOOD COUNT - HISTORICAL: 3.6 THOU/CU MM (ref 4.5–11)

## 2017-07-10 ENCOUNTER — COMMUNICATION - GICH (OUTPATIENT)
Dept: LAB | Facility: OTHER | Age: 62
End: 2017-07-10

## 2017-07-13 ENCOUNTER — COMMUNICATION - GICH (OUTPATIENT)
Dept: FAMILY MEDICINE | Facility: OTHER | Age: 62
End: 2017-07-13

## 2017-07-13 DIAGNOSIS — K21.9 GASTRO-ESOPHAGEAL REFLUX DISEASE WITHOUT ESOPHAGITIS: ICD-10-CM

## 2017-07-19 ENCOUNTER — AMBULATORY - GICH (OUTPATIENT)
Dept: LAB | Facility: OTHER | Age: 62
End: 2017-07-19

## 2017-07-19 DIAGNOSIS — Z94.2 HISTORY OF LUNG TRANSPLANT (H): ICD-10-CM

## 2017-07-19 DIAGNOSIS — Z79.899 OTHER LONG TERM (CURRENT) DRUG THERAPY: ICD-10-CM

## 2017-07-20 ENCOUNTER — HOSPITAL ENCOUNTER (OUTPATIENT)
Dept: RESPIRATORY THERAPY | Facility: OTHER | Age: 62
End: 2017-07-20

## 2017-07-20 ENCOUNTER — AMBULATORY - GICH (OUTPATIENT)
Dept: LAB | Facility: OTHER | Age: 62
End: 2017-07-20

## 2017-07-20 ENCOUNTER — TRANSFERRED RECORDS (OUTPATIENT)
Dept: HEALTH INFORMATION MANAGEMENT | Facility: CLINIC | Age: 62
End: 2017-07-20

## 2017-07-20 DIAGNOSIS — Z94.2 HISTORY OF LUNG TRANSPLANT (H): ICD-10-CM

## 2017-07-20 DIAGNOSIS — Z79.899 OTHER LONG TERM (CURRENT) DRUG THERAPY: ICD-10-CM

## 2017-07-20 DIAGNOSIS — Z79.899 ENCOUNTER FOR LONG-TERM (CURRENT) USE OF MEDICATIONS: ICD-10-CM

## 2017-07-20 DIAGNOSIS — Z94.2 LUNG REPLACED BY TRANSPLANT (H): ICD-10-CM

## 2017-07-20 LAB
ANION GAP - HISTORICAL: 10 (ref 5–18)
BUN SERPL-MCNC: 31 MG/DL (ref 7–25)
BUN/CREAT RATIO - HISTORICAL: 26
CALCIUM SERPL-MCNC: 9.3 MG/DL (ref 8.6–10.3)
CHLORIDE SERPLBLD-SCNC: 104 MMOL/L (ref 98–107)
CO2 SERPL-SCNC: 25 MMOL/L (ref 21–31)
CREAT SERPL-MCNC: 1.18 MG/DL (ref 0.7–1.3)
ERYTHROCYTE [DISTWIDTH] IN BLOOD BY AUTOMATED COUNT: 13.2 % (ref 11.5–15.5)
GFR IF NOT AFRICAN AMERICAN - HISTORICAL: >60 ML/MIN/1.73M2
GLUCOSE SERPL-MCNC: 135 MG/DL (ref 70–105)
HCT VFR BLD AUTO: 36.1 % (ref 37–53)
HEMOGLOBIN: 12.2 G/DL (ref 13.5–17.5)
MAGNESIUM SERPL-MCNC: 1.7 MG/DL (ref 1.9–2.7)
MCH RBC QN AUTO: 33.9 PG (ref 26–34)
MCHC RBC AUTO-ENTMCNC: 33.8 G/DL (ref 32–36)
MCV RBC AUTO: 100 FL (ref 80–100)
PHOSPHATE SERPL-MCNC: 3.8 MG/DL (ref 2.5–5)
PLATELET # BLD AUTO: 157 THOU/CU MM (ref 140–440)
PMV BLD: 9.6 FL (ref 6.5–11)
POTASSIUM SERPL-SCNC: 4.7 MMOL/L (ref 3.5–5.1)
RED BLOOD COUNT - HISTORICAL: 3.6 MIL/CU MM (ref 4.3–5.9)
SODIUM SERPL-SCNC: 139 MMOL/L (ref 133–143)
WHITE BLOOD COUNT - HISTORICAL: 3 THOU/CU MM (ref 4.5–11)

## 2017-07-20 PROCEDURE — 80197 ASSAY OF TACROLIMUS: CPT | Performed by: INTERNAL MEDICINE

## 2017-07-23 LAB
TACROLIMUS BLD-MCNC: 9.8 UG/L (ref 5–15)
TME LAST DOSE: NORMAL H

## 2017-07-24 NOTE — PROGRESS NOTES
Aubrey, your prograf level is 9.8 and near goal range.  No dose changes.  Call me with questions.  Jumana

## 2017-07-26 ENCOUNTER — TELEPHONE (OUTPATIENT)
Dept: TRANSPLANT | Facility: CLINIC | Age: 62
End: 2017-07-26

## 2017-08-10 ENCOUNTER — TELEPHONE (OUTPATIENT)
Dept: TRANSPLANT | Facility: CLINIC | Age: 62
End: 2017-08-10

## 2017-08-10 NOTE — TELEPHONE ENCOUNTER
Contacted Aubrey about local repeat pft's and that Dr Broussard reviewed them and will recheck them at October clinic visit.  FVC and FEV1 slightly better.  Patient reports feeling well and going for daily walks with wife.    Paperwork sent for scanning.

## 2017-08-17 ENCOUNTER — OFFICE VISIT - GICH (OUTPATIENT)
Dept: FAMILY MEDICINE | Facility: OTHER | Age: 62
End: 2017-08-17

## 2017-08-17 ENCOUNTER — HISTORY (OUTPATIENT)
Dept: FAMILY MEDICINE | Facility: OTHER | Age: 62
End: 2017-08-17

## 2017-08-17 DIAGNOSIS — T14.8XXA OTHER INJURY OF UNSPECIFIED BODY REGION: ICD-10-CM

## 2017-08-17 ASSESSMENT — PATIENT HEALTH QUESTIONNAIRE - PHQ9: SUM OF ALL RESPONSES TO PHQ QUESTIONS 1-9: 0

## 2017-09-06 DIAGNOSIS — Z94.2 LUNG REPLACED BY TRANSPLANT (H): ICD-10-CM

## 2017-09-06 RX ORDER — SULFAMETHOXAZOLE AND TRIMETHOPRIM 400; 80 MG/1; MG/1
TABLET ORAL
Qty: 12 TABLET | Refills: 11 | Status: SHIPPED | OUTPATIENT
Start: 2017-09-06 | End: 2018-09-11

## 2017-09-06 NOTE — TELEPHONE ENCOUNTER
Drug Name: smz/tmp 400-80mg  Last Fill Date: 8/12/17  Quantity: 12    Savanah Richey   [facility]  [phone]

## 2017-09-07 ENCOUNTER — COMMUNICATION - GICH (OUTPATIENT)
Dept: PEDIATRICS | Facility: OTHER | Age: 62
End: 2017-09-07

## 2017-09-12 ENCOUNTER — HISTORY (OUTPATIENT)
Dept: PEDIATRICS | Facility: OTHER | Age: 62
End: 2017-09-12

## 2017-09-12 ENCOUNTER — OFFICE VISIT - GICH (OUTPATIENT)
Dept: PEDIATRICS | Facility: OTHER | Age: 62
End: 2017-09-12

## 2017-09-12 DIAGNOSIS — S80.812D ABRASION, LEFT LOWER LEG, SUBSEQUENT ENCOUNTER: ICD-10-CM

## 2017-09-12 DIAGNOSIS — D89.9 DISORDER INVOLVING IMMUNE MECHANISM (H): ICD-10-CM

## 2017-09-12 DIAGNOSIS — Z94.2 HISTORY OF LUNG TRANSPLANT (H): ICD-10-CM

## 2017-09-12 DIAGNOSIS — Z51.89 ENCOUNTER FOR OTHER SPECIFIED AFTERCARE: ICD-10-CM

## 2017-09-14 ENCOUNTER — TELEPHONE (OUTPATIENT)
Dept: PHARMACY | Facility: CLINIC | Age: 62
End: 2017-09-14

## 2017-09-14 LAB — SPECIMEN DESCRIPTION - HISTORICAL: NORMAL

## 2017-09-19 ENCOUNTER — HISTORY (OUTPATIENT)
Dept: INTERNAL MEDICINE | Facility: OTHER | Age: 62
End: 2017-09-19

## 2017-09-19 ENCOUNTER — TELEPHONE (OUTPATIENT)
Dept: TRANSPLANT | Facility: CLINIC | Age: 62
End: 2017-09-19

## 2017-09-19 ENCOUNTER — COMMUNICATION - GICH (OUTPATIENT)
Dept: INTERNAL MEDICINE | Facility: OTHER | Age: 62
End: 2017-09-19

## 2017-09-19 ENCOUNTER — OFFICE VISIT - GICH (OUTPATIENT)
Dept: INTERNAL MEDICINE | Facility: OTHER | Age: 62
End: 2017-09-19

## 2017-09-19 DIAGNOSIS — Z94.2 HISTORY OF LUNG TRANSPLANT (H): ICD-10-CM

## 2017-09-19 DIAGNOSIS — R60.9 EDEMA: ICD-10-CM

## 2017-09-19 DIAGNOSIS — S80.812D ABRASION, LEFT LOWER LEG, SUBSEQUENT ENCOUNTER: ICD-10-CM

## 2017-09-19 DIAGNOSIS — E11.9 TYPE 2 DIABETES MELLITUS WITHOUT COMPLICATIONS (H): ICD-10-CM

## 2017-09-19 DIAGNOSIS — D89.9 DISORDER INVOLVING IMMUNE MECHANISM (H): ICD-10-CM

## 2017-09-19 NOTE — TELEPHONE ENCOUNTER
Dieudonne calls today after seeing a wound care specialist regarding a 2x2 inch wound that developed after a 2x6 board fell on his leg while adding addition to his garage 8/29/17.  He had seen his PCP who referred him to wound specialist.    Prescribed Santyl debridement cream to use on part of wound that was not healing due to old tissue.  Most of wound has healed and is considered clean.    Encouraged Dieudonne to call with any changes needing our attention.

## 2017-09-24 ENCOUNTER — TELEPHONE (OUTPATIENT)
Dept: PULMONOLOGY | Facility: CLINIC | Age: 62
End: 2017-09-24

## 2017-09-24 NOTE — TELEPHONE ENCOUNTER
Contacted by Kyung, the patients wife, regarding Aubrey's ongoing wound on his leg.  The wound occurred ~8/16.  He has taken a course of Augmentin and seen a wound care nurse in his home town.  He is currently using santyl debridement cream.  Per the patient's wife the wound has not improved at all and is quite tender to touch.  No fevers, of e/o cellulitis.    Advised the patient to follow up with the wound nurse and continue current treatment.  If a second opinion is desired they will travel to the [place].  Kyung was in agreement with this plan.

## 2017-09-26 ENCOUNTER — OFFICE VISIT - GICH (OUTPATIENT)
Dept: INTERNAL MEDICINE | Facility: OTHER | Age: 62
End: 2017-09-26

## 2017-09-26 ENCOUNTER — HISTORY (OUTPATIENT)
Dept: INTERNAL MEDICINE | Facility: OTHER | Age: 62
End: 2017-09-26

## 2017-09-26 DIAGNOSIS — S81.802D UNSPECIFIED OPEN WOUND, LEFT LOWER LEG, SUBSEQUENT ENCOUNTER: ICD-10-CM

## 2017-09-26 DIAGNOSIS — D89.9 DISORDER INVOLVING IMMUNE MECHANISM (H): ICD-10-CM

## 2017-09-26 DIAGNOSIS — Z94.2 HISTORY OF LUNG TRANSPLANT (H): ICD-10-CM

## 2017-09-26 DIAGNOSIS — E11.9 TYPE 2 DIABETES MELLITUS WITHOUT COMPLICATIONS (H): ICD-10-CM

## 2017-09-26 DIAGNOSIS — R60.9 EDEMA: ICD-10-CM

## 2017-10-02 ENCOUNTER — OFFICE VISIT - GICH (OUTPATIENT)
Dept: INTERNAL MEDICINE | Facility: OTHER | Age: 62
End: 2017-10-02

## 2017-10-02 ENCOUNTER — HOSPITAL ENCOUNTER (INPATIENT)
Facility: CLINIC | Age: 62
LOS: 6 days | Discharge: NURSING, REHAB OR LONG-TERM CARE | DRG: 603 | End: 2017-10-09
Attending: EMERGENCY MEDICINE | Admitting: INTERNAL MEDICINE
Payer: MEDICARE

## 2017-10-02 ENCOUNTER — COMMUNICATION - GICH (OUTPATIENT)
Dept: INTERNAL MEDICINE | Facility: OTHER | Age: 62
End: 2017-10-02

## 2017-10-02 ENCOUNTER — HISTORY (OUTPATIENT)
Dept: INTERNAL MEDICINE | Facility: OTHER | Age: 62
End: 2017-10-02

## 2017-10-02 ENCOUNTER — TELEPHONE (OUTPATIENT)
Dept: TRANSPLANT | Facility: CLINIC | Age: 62
End: 2017-10-02

## 2017-10-02 ENCOUNTER — AMBULATORY - GICH (OUTPATIENT)
Dept: SCHEDULING | Facility: OTHER | Age: 62
End: 2017-10-02

## 2017-10-02 ENCOUNTER — TRANSFERRED RECORDS (OUTPATIENT)
Dept: HEALTH INFORMATION MANAGEMENT | Facility: CLINIC | Age: 62
End: 2017-10-02

## 2017-10-02 DIAGNOSIS — L08.9 LOCAL INFECTION OF SKIN AND SUBCUTANEOUS TISSUE: ICD-10-CM

## 2017-10-02 DIAGNOSIS — T14.8XXA WOUND INFECTION: ICD-10-CM

## 2017-10-02 DIAGNOSIS — Z94.2 HISTORY OF LUNG TRANSPLANT (H): ICD-10-CM

## 2017-10-02 DIAGNOSIS — S81.802D UNSPECIFIED OPEN WOUND, LEFT LOWER LEG, SUBSEQUENT ENCOUNTER: ICD-10-CM

## 2017-10-02 DIAGNOSIS — S81.809A OPEN WOUND OF KNEE, LEG, AND ANKLE: ICD-10-CM

## 2017-10-02 DIAGNOSIS — T14.8XXA OTHER INJURY OF UNSPECIFIED BODY REGION, INITIAL ENCOUNTER (CODE): ICD-10-CM

## 2017-10-02 DIAGNOSIS — E87.5 HYPERKALEMIA: ICD-10-CM

## 2017-10-02 DIAGNOSIS — S91.009A OPEN WOUND OF KNEE, LEG, AND ANKLE: ICD-10-CM

## 2017-10-02 DIAGNOSIS — Z51.89 ENCOUNTER FOR OTHER SPECIFIED AFTERCARE: ICD-10-CM

## 2017-10-02 DIAGNOSIS — L08.9 WOUND INFECTION: ICD-10-CM

## 2017-10-02 DIAGNOSIS — S81.009A OPEN WOUND OF KNEE, LEG, AND ANKLE: ICD-10-CM

## 2017-10-02 DIAGNOSIS — D89.9 DISORDER INVOLVING IMMUNE MECHANISM (H): ICD-10-CM

## 2017-10-02 LAB
ABSOLUTE BASOPHILS - HISTORICAL: 0 THOU/CU MM
ABSOLUTE EOSINOPHILS - HISTORICAL: 0.1 THOU/CU MM
ABSOLUTE IMMATURE GRANULOCYTES(METAS,MYELOS,PROS) - HISTORICAL: 0.4 THOU/CU MM
ABSOLUTE LYMPHOCYTES - HISTORICAL: 1.4 THOU/CU MM (ref 0.9–2.9)
ABSOLUTE MONOCYTES - HISTORICAL: 0.3 THOU/CU MM
ABSOLUTE NEUTROPHILS - HISTORICAL: 1.2 THOU/CU MM (ref 1.7–7)
ANION GAP SERPL CALCULATED.3IONS-SCNC: 7 MMOL/L (ref 3–14)
BASOPHILS # BLD AUTO: 0 10E9/L (ref 0–0.2)
BASOPHILS # BLD AUTO: 1.2 %
BASOPHILS NFR BLD AUTO: 0.9 %
BUN SERPL-MCNC: 30 MG/DL (ref 7–30)
C-REACTIVE PROTEIN - HISTORICAL: <1 MG/DL
CALCIUM SERPL-MCNC: 9.2 MG/DL (ref 8.5–10.1)
CHLORIDE SERPL-SCNC: 105 MMOL/L (ref 94–109)
CO2 SERPL-SCNC: 25 MMOL/L (ref 20–32)
CREAT SERPL-MCNC: 1.06 MG/DL (ref 0.66–1.25)
DIFFERENTIAL METHOD BLD: ABNORMAL
EOSINOPHIL # BLD AUTO: 0.1 10E9/L (ref 0–0.7)
EOSINOPHIL NFR BLD AUTO: 1.8 %
EOSINOPHIL NFR BLD AUTO: 4.1 %
ERYTHROCYTE [DISTWIDTH] IN BLOOD BY AUTOMATED COUNT: 13.2 % (ref 11.5–15.5)
ERYTHROCYTE [DISTWIDTH] IN BLOOD BY AUTOMATED COUNT: 13.6 % (ref 10–15)
ERYTHROCYTE [SEDIMENTATION RATE] IN BLOOD: 27 MM/HR
GFR SERPL CREATININE-BSD FRML MDRD: 70 ML/MIN/1.7M2
GLUCOSE SERPL-MCNC: 151 MG/DL (ref 70–99)
GRAM STN SPEC: ABNORMAL
HCT VFR BLD AUTO: 39.5 % (ref 37–53)
HCT VFR BLD AUTO: 41.5 % (ref 40–53)
HEMOGLOBIN: 13.1 G/DL (ref 13.5–17.5)
HGB BLD-MCNC: 13.7 G/DL (ref 13.3–17.7)
IMMATURE GRANULOCYTES(METAS,MYELOS,PROS) - HISTORICAL: 11.7 %
LACTATE BLD-SCNC: 1.6 MMOL/L (ref 0.7–2)
LYMPHOCYTES # BLD AUTO: 1.8 10E9/L (ref 0.8–5.3)
LYMPHOCYTES NFR BLD AUTO: 40.5 % (ref 20–44)
LYMPHOCYTES NFR BLD AUTO: 57 %
Lab: ABNORMAL
MCH RBC QN AUTO: 32.8 PG (ref 26–34)
MCH RBC QN AUTO: 33.7 PG (ref 26.5–33)
MCHC RBC AUTO-ENTMCNC: 33 G/DL (ref 31.5–36.5)
MCHC RBC AUTO-ENTMCNC: 33.2 G/DL (ref 32–36)
MCV RBC AUTO: 102 FL (ref 78–100)
MCV RBC AUTO: 99 FL (ref 80–100)
MONOCYTES # BLD AUTO: 0.2 10E9/L (ref 0–1.3)
MONOCYTES NFR BLD AUTO: 6.1 %
MONOCYTES NFR BLD AUTO: 8.5 %
NEUTROPHILS # BLD AUTO: 1.1 10E9/L (ref 1.6–8.3)
NEUTROPHILS NFR BLD AUTO: 34 % (ref 42–72)
NEUTROPHILS NFR BLD AUTO: 34.2 %
PLATELET # BLD AUTO: 190 THOU/CU MM (ref 140–440)
PLATELET # BLD AUTO: 197 10E9/L (ref 150–450)
PLATELET # BLD EST: ABNORMAL 10*3/UL
PMV BLD: 9.6 FL (ref 6.5–11)
POTASSIUM SERPL-SCNC: 5.7 MMOL/L (ref 3.4–5.3)
RBC # BLD AUTO: 4.07 10E12/L (ref 4.4–5.9)
RBC MORPH BLD: NORMAL
RED BLOOD COUNT - HISTORICAL: 4 MIL/CU MM (ref 4.3–5.9)
SODIUM SERPL-SCNC: 137 MMOL/L (ref 133–144)
SPECIMEN SOURCE: ABNORMAL
WBC # BLD AUTO: 3.2 10E9/L (ref 4–11)
WHITE BLOOD COUNT - HISTORICAL: 3.4 THOU/CU MM (ref 4.5–11)

## 2017-10-02 PROCEDURE — 93010 ELECTROCARDIOGRAM REPORT: CPT | Mod: Z6 | Performed by: EMERGENCY MEDICINE

## 2017-10-02 PROCEDURE — 96361 HYDRATE IV INFUSION ADD-ON: CPT | Performed by: EMERGENCY MEDICINE

## 2017-10-02 PROCEDURE — 25000128 H RX IP 250 OP 636: Performed by: EMERGENCY MEDICINE

## 2017-10-02 PROCEDURE — 83605 ASSAY OF LACTIC ACID: CPT | Performed by: EMERGENCY MEDICINE

## 2017-10-02 PROCEDURE — 80048 BASIC METABOLIC PNL TOTAL CA: CPT | Performed by: EMERGENCY MEDICINE

## 2017-10-02 PROCEDURE — 87077 CULTURE AEROBIC IDENTIFY: CPT | Performed by: EMERGENCY MEDICINE

## 2017-10-02 PROCEDURE — 85025 COMPLETE CBC W/AUTO DIFF WBC: CPT | Performed by: EMERGENCY MEDICINE

## 2017-10-02 PROCEDURE — 87205 SMEAR GRAM STAIN: CPT | Performed by: EMERGENCY MEDICINE

## 2017-10-02 PROCEDURE — 96365 THER/PROPH/DIAG IV INF INIT: CPT | Performed by: EMERGENCY MEDICINE

## 2017-10-02 PROCEDURE — 80197 ASSAY OF TACROLIMUS: CPT | Performed by: EMERGENCY MEDICINE

## 2017-10-02 PROCEDURE — 96375 TX/PRO/DX INJ NEW DRUG ADDON: CPT | Performed by: EMERGENCY MEDICINE

## 2017-10-02 PROCEDURE — 99285 EMERGENCY DEPT VISIT HI MDM: CPT | Mod: 25 | Performed by: EMERGENCY MEDICINE

## 2017-10-02 PROCEDURE — 87070 CULTURE OTHR SPECIMN AEROBIC: CPT | Performed by: EMERGENCY MEDICINE

## 2017-10-02 PROCEDURE — 93005 ELECTROCARDIOGRAM TRACING: CPT | Performed by: EMERGENCY MEDICINE

## 2017-10-02 PROCEDURE — 87186 SC STD MICRODIL/AGAR DIL: CPT | Performed by: EMERGENCY MEDICINE

## 2017-10-02 RX ORDER — FUROSEMIDE 10 MG/ML
20 INJECTION INTRAMUSCULAR; INTRAVENOUS ONCE
Status: COMPLETED | OUTPATIENT
Start: 2017-10-02 | End: 2017-10-02

## 2017-10-02 RX ADMIN — FUROSEMIDE 20 MG: 10 INJECTION, SOLUTION INTRAVENOUS at 23:41

## 2017-10-02 RX ADMIN — SODIUM CHLORIDE 500 ML: 9 INJECTION, SOLUTION INTRAVENOUS at 22:05

## 2017-10-02 NOTE — IP AVS SNAPSHOT
Aubrey Hamlin #[id number] (CSN: [id number])  (62 year old M)  (Adm: 10/02/17)     [id number]               UNIT [address] [place] [address] BANK: [phone]            Patient Demographics     Patient Name Sex          Age SSN Address Phone    Aubrey Hamlin Male 1955 (62 year old) [id number] [address]  [place] [address] [phone] (Home)  [phone] (Mobile) *Preferred*      Emergency Contact(s)     Name Relation Home Work Mobile    MARIA ESTHER HAMLIN Spouse [phone]    Nikole Arroyo Daughter [phone]      Admission Information     Attending Provider Admitting Provider Admission Type Admission Date/Time    Vivian Stewart MD Zewdrachel, Joon NAGEL MD Elective 10/02/17  1637    Discharge Date Hospital Service Auth/Cert Status Service Area     Internal Medicine [facility]    Unit Room/Bed Admission Status       [facility] 5216/5216-01 Admission (Confirmed)       Admission     Complaint    Wound of lower extremity      Hospital Account     Name Acct ID Class Status Primary Coverage    Aubrey Hamlin [id number] Inpatient Open MEDICARE - MEDICARE            Guarantor Account (for Hospital Account #[id number])     Name Relation to Pt Service Area Active? Acct Type    Perla, Aubrey THORPE Self FCS Yes Personal/Family    Address Phone          [address]  [place] [address] [phone](H)              Coverage Information (for Hospital Account #[id number])     1. MEDICARE/MEDICARE     F/O Payor/Plan Precert #    MEDICARE/MEDICARE     Subscriber Subscriber #    Aburey Hamlin MAURILIO [id number]    Address Phone    ATTN CLAIMS  [address]  [place] [address] [phone]          2. [place]     F/O Payor/Plan Precert #    [place]     Subscriber Subscriber #    Perla, Aubrey THORPE [id number]    Address Phone    [address] [address]  [place] [address] [phone]                                                      INTERAGENCY TRANSFER FORM - PHYSICIAN ORDERS  "  10/2/2017                       UNIT [address] [facility]: [phone]            Attending Provider: Vivian Stewart MD     Allergies:  Heparin Cross Reactors, Oxycodone, Fluocinolone, Levaquin, Pneumococcal Vaccine, Sulfa Drugs, Sulfasalazine    Infection:  None   Service:  INTERNAL MED    Ht:  1.702 m (5' 7.01\")   Wt:  74.1 kg (163 lb 6.4 oz)   Admission Wt:  74.8 kg (164 lb 14.5 oz)    BMI:  25.59 kg/m 2   BSA:  1.87 m 2            ED Clinical Impression     Diagnosis Description Comment Added By Time Added    Wound infection [T14.8XXA, L08.9] Wound infection [T14.8XXA, L08.9]  Darwin Richards MD 10/3/2017  1:07 AM    Hyperkalemia [E87.5] Hyperkalemia [E87.5]  Darwin Richards MD 10/3/2017  1:07 AM      Hospital Problems as of 10/9/2017              Priority Class Noted POA    Wound of lower extremity Medium  10/3/2017 Yes      Non-Hospital Problems as of 10/9/2017              Priority Class Noted    Alpha-1-antitrypsin deficiency (H) Medium  Unknown    Heparin-induced thrombocytopenia (H) Medium  9/1/2013    DVT of upper extremity (deep vein thrombosis) (H) Medium  9/1/2013    Lung transplant status, bilateral (H) Medium  9/8/2013    Acute postoperative pain Medium  9/11/2013    Constipation due to pain medication Medium  9/11/2013    Encounter for long-term current use of medication Medium  10/10/2013    Steroid-induced diabetes mellitus (H) Medium  Unknown    CMV infection, acute (H) Medium  10/17/2013    Prophylactic antibiotic Medium  10/17/2013    Wound infection Medium  9/20/2014      Code Status History     Date Active Date Inactive Code Status Order ID Comments User Context    9/22/2014  4:05 PM 10/3/2017  4:46 AM Full Code [id number]  Christi Villegas RN Outpatient    9/20/2014  9:12 PM 9/22/2014  4:05 PM Full Code [id number]  Rojas Meyer MD Inpatient    9/9/2013 12:31 AM 10/4/2013  4:18 PM Full Code [id number]  Yessenia Lazar RN Inpatient      Current Code Status     Date Active Code " Status Order ID Comments User Context       10/3/2017  4:46 AM Full Code [id number]  Abdirizak Duarte MD Inpatient       Summary of Visit     Reason for your hospital stay       You were hospitalized because of non-healing wound in left leg. You required IV antibiotics as this wound was infected. You will need 1 additional week of IV antibiotics.                Medication Review      START taking        Dose / Directions Comments    HYDROmorphone 2 MG tablet   Commonly known as:  DILAUDID   Used for:  Wound infection        Dose:  2 mg   Take 1 tablet (2 mg) by mouth every 6 hours as needed for moderate to severe pain   Quantity:  20 tablet   Refills:  0        order for DME   Used for:  Wound infection        Equipment being ordered: Polymem Non-Adhesive Pad Treatment Diagnosis: Wound infection   Quantity:  30 pad   Refills:  1        piperacillin-tazobactam 3-0.375 GM vial   Commonly known as:  ZOSYN   Indication:  Skin and Soft Tissue Infection   Used for:  Wound infection        Dose:  3.375 g   Inject 3.375 g into the vein every 6 hours for 7 days   Quantity:  40 each   Refills:  0          CONTINUE these medications which may have CHANGED, or have new prescriptions. If we are uncertain of the size of tablets/capsules you have at home, strength may be listed as something that might have changed.        Dose / Directions Comments    tacrolimus 1 MG capsule   Commonly known as:  GENERIC EQUIVALENT   This may have changed:  additional instructions   Used for:  Lung transplant status, bilateral (H)        Take 3 caps every am and 3 mg every pm.   Quantity:  180 capsule   Refills:  12    Dosing change.         CONTINUE these medications which have NOT CHANGED        Dose / Directions Comments    albuterol 108 (90 BASE) MCG/ACT Inhaler   Commonly known as:  PROAIR HFA/PROVENTIL HFA/VENTOLIN HFA   Used for:  Wheezing        Dose:  2 puff   Inhale 2 puffs into the lungs every 6 hours as needed for shortness of breath /  dyspnea or wheezing   Quantity:  3 Inhaler   Refills:  11        alendronate 70 MG tablet   Commonly known as:  FOSAMAX   Used for:  Osteopenia        Dose:  70 mg   Take 1 tablet (70 mg) by mouth every 7 days Take with 8 ounces water, at least 60 min before breakfast, and stay upright for at least 30 min after dose.   Quantity:  4 tablet   Refills:  12        azaTHIOprine 50 MG tablet   Commonly known as:  IMURAN   Used for:  Lung replaced by transplant (H)        Dose:  25 mg   Take 0.5 tablets (25 mg) by mouth daily   Quantity:  15 tablet   Refills:  11        calcium-vitamin D 600-400 MG-UNIT per tablet   Commonly known as:  CALTRATE   Used for:  Lung transplant status, bilateral (H)        Dose:  1 tablet   Take 1 tablet by mouth 2 times daily (with meals)   Quantity:  60 tablet   Refills:  12        furosemide 20 MG tablet   Commonly known as:  LASIX   Used for:  Hypertension        TAKE ONE TABLET BY MOUTH EVERY DAY   Quantity:  30 tablet   Refills:  11        LISINOPRIL PO        Dose:  5 mg   Take 5 mg by mouth daily   Refills:  0        loperamide 2 MG capsule   Commonly known as:  IMODIUM   Used for:  Lung transplant status, bilateral (H)        Dose:  2 mg   Take 1 capsule (2 mg) by mouth 4 times daily as needed for diarrhea   Quantity:  120 capsule   Refills:  12        metoprolol 25 MG tablet   Commonly known as:  LOPRESSOR   Used for:  Hypertension        TAKE ONE TABLET BY MOUTH TWICE A DAY   Quantity:  60 tablet   Refills:  12        multivitamin, therapeutic Tabs tablet   Used for:  Lung transplant status, bilateral (H)        Dose:  1 tablet   Take 1 tablet by mouth daily   Quantity:  30 tablet   Refills:  12        omeprazole 20 MG CR capsule   Commonly known as:  priLOSEC   Used for:  Lung transplant status, bilateral (H)        Dose:  20 mg   Take 1 capsule (20 mg) by mouth 2 times daily (before meals)   Quantity:  60 capsule   Refills:  12        predniSONE 5 MG tablet   Commonly known as:   DELTASONE   Used for:  Lung transplant status, bilateral (H)        TAKE ONE TABLET BY MOUTH EVERY MORNING AND TAKE ONE-HALF TABLET BY MOUTH EVERY EVENING   Quantity:  45 tablet   Refills:  12        sildenafil 25 MG tablet   Commonly known as:  VIAGRA   Used for:  ED (erectile dysfunction)        Dose:  25 mg   Take 1 tablet (25 mg) by mouth as needed for erectile dysfunction   Quantity:  30 tablet   Refills:  0        sulfamethoxazole-trimethoprim 400-80 MG per tablet   Commonly known as:  BACTRIM/SEPTRA   Used for:  Lung replaced by transplant (H)        TAKE ONE TABLET BY MOUTH THREE TIMES A WEEK   Quantity:  12 tablet   Refills:  11        valGANciclovir 450 MG tablet   Commonly known as:  VALCYTE   Used for:  CMV (cytomegalovirus infection) (H), Lung replaced by transplant (H)        TAKE ONE TABLETS (450MG) BY MOUTH TWO TIMES A DAY   Quantity:  60 tablet   Refills:  11                After Care     Activity - Up ad aviva           Diet       Follow this diet upon discharge: Orders Placed This Encounter      Regular Diet Adult       General info for SNF       Length of Stay Estimate: Short Term Care: Estimated # of Days <30  Condition at Discharge: Improving  Level of care:board and care  Rehabilitation Potential: Fair  Admission H&P remains valid and up-to-date: Yes  Recent Chemotherapy: N/A  Use Nursing Home Standing Orders: Yes       Wound care       Site:   Left leg  Instructions:  Continue with daily wound cleaning and dressing changes. LLE wounds:Apply polymem foam dressing over both wounds (no need to cleanse wounds first)  Secure with tape or kerlix roll gauze.  Change daily.               Further instructions from your care team       We have scheduled an appt for you on Thursday 10/12 at 11:00 am with your Primary Care Provider, Dr. David Jiang. Please bring your insurance card, ID, and these discharge papers with you to this appointment. *At the end of this appointment, they will set up your weekly  appointments to manage your wound.*  [facility]  [address] [place] [address]  Phone #: [phone]      Referrals     Medication Therapy Management Referral       Reason for referral:  on more than 5 medications and managing chronic disease and on more than 10 medications and hospitalized or in the ED in the past 6 months     This service is designed to help you get the most from your medications.  A specially trained pharmacist will work closely with you and your doctors  to solve any problems related to your medications and to help you get the   best results from taking them.      The Medication Therapy Management staff will call you to schedule an appointment.             Follow-Up Appointment Instructions     Follow Up and recommended labs and tests       Follow up with Nursing home physician.  No follow up labs or test are needed.  Follow up with primary care provider in 1 weeks.  No follow up labs or test are needed.    Continue to follow with your pulmonary transplant team as you have been doing before admission.             Your next 10 appointments already scheduled     Oct 19, 2017  8:00 AM CDT   Lab with  LAB   [facility] ([facility])    [address] [place] [address] [phone]            Oct 19, 2017  8:15 AM CDT   (Arrive by 8:00 AM)   XR CHEST 2 VIEWS with XR1   [facility] Xray ([facility])    [address] [place] [address] [phone]           Please bring a list of your current medicines to your exam. (Include vitamins, minerals and over-thecounter medicines.) Leave your valuables at home.  Tell your doctor if there is a chance you may be pregnant.  You do not need to do anything special for this exam.            Oct 19, 2017  8:30 AM CDT   DX HIP/PELVIS/SPINE with DX[address] [place] [address] [facility] ([facility])     "[address] [address]   [phone]           Please do not take any of the following 24 hours prior to the day of your exam: vitamins, calcium tablets, antacids.  If possible, please wear clothes without metal (snaps, zippers). A sweatsuit works well.            Oct 19, 2017  9:00 AM CDT   SIX MINUTE WALK with UC PFL A, UC PFL 6 MINUTE WALK 2    [facility] ([facility])    [address] [place] [address]   [phone]            Oct 19, 2017 10:20 AM CDT   (Arrive by 10:05 AM)   Return Lung Transplant with Kirk Broussard MD   [facility] ([facility])    [address] [place] [address]   [phone]            Oct 19, 2017 12:45 PM CDT   (Arrive by 12:30 PM)   Return Visit with Jonny Clifton MD   [facility] Dermatology ([facility])    [address] [place] [address]   [phone]              Statement of Approval     Ordered          10/09/17 1017  I have reviewed and agree with all the recommendations and orders detailed in this document.  EFFECTIVE NOW     Approved and electronically signed by:  Chad Pickens MD                                                 INTERAGENCY TRANSFER FORM - NURSING   10/2/2017                       [address] [facility] BANK: [phone]            Attending Provider: Vivian Stewart MD     Allergies:  Heparin Cross Reactors, Oxycodone, Fluocinolone, Levaquin, Pneumococcal Vaccine, Sulfa Drugs, Sulfasalazine    Infection:  None   Service:  INTERNAL MED    Ht:  1.702 m (5' 7.01\")   Wt:  74.1 kg (163 lb 6.4 oz)   Admission Wt:  74.8 kg (164 lb 14.5 oz)    BMI:  25.59 kg/m 2   BSA:  1.87 m 2            Advance Directives        Does patient have a scanned Advance Directive/ACP document in EPIC?           No        Immunizations     " "Name Date      Influenza (High Dose) 3 valent vaccine 09/26/17     Influenza (High Dose) 3 valent vaccine 10/05/15     Influenza (High Dose) 3 valent vaccine 10/06/14     Influenza (High Dose) 3 valent vaccine 10/24/13     Influenza (IIV3) 09/17/12     Influenza Vaccine IM 3yrs+ 4 Valent IIV4 09/30/16     Influenza Vaccine, 3 YRS +, IM (QUADRIVALENT W/PRESERVATIVES) 02/08/16       ASSESSMENT     Discharge Profile Flowsheet     DISCHARGE NEEDS ASSESSMENT     Inspection under devices  Full 10/08/17 2210    Equipment Used at Home  none 09/22/14 1137   Not Inspected under devices.  -- 10/04/17 1319    GASTROINTESTINAL (ADULT,PEDIATRIC,OB)     Skin WDL  ex 10/09/17 0133    GI WDL  ex 10/09/17 0133   Skin Color/Characteristics  bruised (ecchymotic);karon 10/09/17 0133    Abdominal Appearance  obese 10/07/17 0042   Skin Temperature  warm 10/09/17 0133    All Quadrants Bowel Sounds  audible and normoactive 10/09/17 0133   Skin Moisture  dry 10/09/17 0133    Last Bowel Movement  10/08/17 10/09/17 0133   Skin Elasticity  quick return to original state 10/08/17 1724    GI Signs/Symptoms  diarrhea 10/08/17 2210   Skin Integrity  bruise(s);wound(s) 10/09/17 0133    Passing flatus  yes 10/09/17 0133   Full except areas not inspected   Hip, left;Hip, right;Buttock, left;Buttock, right;Sacrum;Coccyx 10/09/17 0133    COMMUNICATION ASSESSMENT     SAFETY      Patient's communication style  spoken language (English or Bilingual) 10/02/17 1624   Safety WDL  WDL 10/09/17 0136    SKIN     All Alarms  none present 10/09/17 0136    Inspection of bony prominences  Full except (identify areas not inspected) 10/09/17 0133                      Assessment WDL (Within Defined Limits) Definitions           Safety WDL     Effective: 09/28/15    Row Information: <b>WDL Definition:</b> Bed in low position, wheels locked; call light in reach; upper side rails up x 2; ID band on<br> <font color=\"gray\"><i>Item=AS safety wdl>>List=AS safety " "wdl>>Version=F14</i></font>      Skin WDL     Effective: 09/28/15    Row Information: <b>WDL Definition:</b> Warm; dry; intact; elastic; without discoloration; pressure points without redness<br> <font color=\"gray\"><i>Item=AS skin wdl>>List=AS skin wdl>>Version=F14</i></font>      Vitals     Vital Signs Flowsheet     VITAL SIGNS     Sleep  normal sleep 10/09/17 0807    Temp  96.4  F (35.8  C) 10/09/17 0626   ANALGESIA SIDE EFFECTS MONITORING      Temp src  Oral 10/09/17 0626   Side Effects Monitoring: Respiratory Quality  R 10/08/17 2335    Resp  16 10/09/17 0626   Side Effects Monitoring: Respiratory Depth  N 10/08/17 2335    Pulse  62 10/09/17 0626   Side Effects Monitoring: Sedation Level  1 10/08/17 2335    Heart Rate  73 10/07/17 2258   HEIGHT AND WEIGHT      Pulse/Heart Rate Source  Monitor 10/09/17 0626   Height  1.702 m (5' 7.01\") 10/03/17 0421    BP  168/87 10/09/17 0626   Weight  74.1 kg (163 lb 6.4 oz) 10/03/17 0421    BP Location  Right arm 10/09/17 0626   Estimated Weight (From ED)  78.5 kg (173 lb) 10/02/17 1627    OXYGEN THERAPY     BSA (Calculated - sq m)  1.87 10/03/17 0421    SpO2  97 % 10/09/17 0626   BMI (Calculated)  25.64 10/03/17 0421    O2 Device  None (Room air) 10/09/17 0626   EKG MONITORING      PAIN/COMFORT     Cardiac Regularity  Regular 10/02/17 2213    Patient Currently in Pain  yes 10/09/17 0807   Cardiac Rhythm  NSR 10/02/17 2213    Preferred Pain Scale  CAPA (Clinically Aligned Pain Assessment) ([facility], [facility] Adults Only) 10/09/17 0556   YUDY COMA SCALE      Pain Location  Leg 10/09/17 0807   Best Eye Response  4-->(E4) spontaneous 10/06/17 1127    Pain Orientation  Left 10/09/17 0807   Best Motor Response  6-->(M6) obeys commands 10/06/17 1127    Pain Descriptors  Aching 10/09/17 0807   Best Verbal Response  5-->(V5) oriented 10/06/17 1127    Pain Management Interventions  analgesia administered 10/08/17 2158   Yudy Coma Scale Score  15 10/06/17 1127    Pain " Intervention(s)  Medication (See eMAR) 10/09/17 0807   POSITIONING      Response to Interventions  Decrease in pain 10/08/17 2335   Body Position  independently positioning 10/09/17 0832    CLINICALLY ALIGNED PAIN ASSESSMENT (CAPA) ([facility] ADULTS ONLY)     Head of Bed (HOB)  HOB at 30-45 degrees 10/09/17 0832    Comfort  comfortably manageable 10/09/17 0807   Positioning/Transfer Devices  pillows 10/09/17 0832    Change in Pain  getting better 10/09/17 0807   DAILY CARE      Pain Control  partially effective 10/09/17 0807   Activity Management  activity adjusted per tolerance;activity encouraged 10/09/17 0832    Functioning  can do most things, but pain gets in the way of some 10/09/17 0807   Activity Assistance Provided  independent 10/09/17 0832            Patient Lines/Drains/Airways Status    Active LINES/DRAINS/AIRWAYS     Name: Placement date: Placement time: Site: Days: Last dressing change:    Wound 09/11/13 Anterior;Right Chest three former CT sites 09/11/13   1600   Chest   1488     Wound 09/21/14 Right Calf Puncture injury from stick 8/14 09/21/14   0941   Calf   1114     Wound 10/02/17 Left;Lower;Lateral Leg Other (comment) Wound from August 2017 10/02/17   2038   Leg   6     Incision/Surgical Site 09/08/13 Transverse Chest 09/08/13   2149    1491             Patient Lines/Drains/Airways Status    Active PICC/CVC     Name: Placement date: Placement time: Site: Days: Additional Info Last dressing change:    PICC Double Lumen 10/05/17 Left Brachial vein medial 10/05/17   1457   Brachial vein medial   3 External Cath Length (cm): 3 cm            Size (Fr): 5 Fr            Orientation: Left            Extremity Circumference (cm): 26 cm            Catheter Brand: Navilyst, BioFlo            Dressing Intervention: Chlorhexidine patch;Securing device;Transparent;New dressing            Description: Valved;Power PICC            Total Catheter Length (cm) Trimmed: 47 cm            Site Prep:  Chlorhexidine            Local Anesthetic: Injectable            Inserted by: Velvet Dobson RN [facility]            Insertion attempts with ultrasound: 1            Patient Tolerance: Tolerated well            Placement Verification: Blood Return;Flouroscopy            Difficulty with threading line: No            Tip location: SVC            Full barrier precautions done: Yes            Consent Signed: Yes            Time Out performed: Yes            Lot #: [id number]            Use for : Antibiotics. 5A Patient's nurse notified picc is ready to tariq.               Intake/Output Detail Report     Date Intake     Output     Net    Shift P.O. I.V. IV Piggyback Total Urine Emesis/NG output Stool Total       Day 10/08/17 0000 - 10/08/17 0659 -- -- -- -- -- -- -- -- 0    Gissell 10/08/17 0700 - 10/08/17 1459 -- -- -- -- -- -- -- -- 0    Noc 10/08/17 1500 - 10/08/17 2359 360 -- -- 360 -- -- -- -- 360    Day 10/09/17 0000 - 10/09/17 0659 340 100 -- 440 -- -- -- -- 440    Gissell 10/09/17 0700 - 10/09/17 1459 -- -- -- -- -- -- -- -- 0      Last Void/BM       Most Recent Value    Urine Occurrence 1 at 10/09/2017 0500    Stool Occurrence 0 at 10/09/2017 0500      Case Management/Discharge Planning     Case Management/Discharge Planning Flowsheet     LIVING ENVIRONMENT     ABUSE RISK SCREEN      Lives With  spouse 10/03/17 0441   QUESTION TO PATIENT:  Has a member of your family or a partner(now or in the past) intimidated, hurt, manipulated, or controlled you in any way?  no 10/02/17 1628    Living Arrangements  [place] 09/22/14 0951   QUESTION TO PATIENT: Do you feel safe going back to the place where you are living?  yes 10/02/17 1628    COPING/STRESS     OBSERVATION: Is there reason to believe there has been maltreatment of a vulnerable adult (ie. Physical/Sexual/Emotional abuse, self neglect, lack of adequate food, shelter, medical care, or financial exploitation)?  no 10/02/17 1628    Major Change/Loss/Stressor  hospitalization  10/03/17 0441   (R) MENTAL HEALTH SUICIDE RISK      DISCHARGE PLANNING     Are you depressed or being treated for depression?  No 10/03/17 0441    Equipment Used at Home  none 09/22/14 1137   HOMICIDE RISK      MH/BH CAREGIVER     Feels Like Hurting Others  no 10/02/17 1628    Filed Complexity Screen Score  7 10/03/17 0928                       UNIT [facility] [facility] BANK: [phone]            Medication Administration Report for Aubrey Duncan as of 10/09/17 1045   Legend:    Given Hold Not Given Due Canceled Entry Other Actions    Time Time (Time) Time  Time-Action       Inactive    Active    Linked        Medications 10/03/17 10/04/17 10/05/17 10/06/17 10/07/17 10/08/17 10/09/17    albuterol (PROAIR HFA/PROVENTIL HFA/VENTOLIN HFA) Inhaler 2 puff  Dose: 2 puff Freq: EVERY 6 HOURS PRN Route: IN  PRN Reasons: shortness of breath / dyspnea,wheezing  Start: 10/03/17 0446              atorvastatin (LIPITOR) tablet 40 mg  Dose: 40 mg Freq: DAILY Route: PO  Start: 10/05/17 0800      0750 (40 mg)-Given        0827 (40 mg)-Given        0815 (40 mg)-Given        0817 (40 mg)-Given        0800 (40 mg)-Given           azaTHIOprine (IMURAN) half-tab 25 mg  Dose: 25 mg Freq: DAILY Route: PO  Start: 10/03/17 0800    0930 (25 mg)-Given        0744 (25 mg)-Given        0750 (25 mg)-Given        0827 (25 mg)-Given               2011 (25 mg)-Given        2050 (25 mg)-Given        [ ] 2000           calcium-vitamin D (CALTRATE) 600-400 MG-UNIT per tablet 1 tablet  Dose: 1 tablet Freq: DAILY Route: PO  Start: 10/03/17 0800    0928 (1 tablet)-Given        0744 (1 tablet)-Given        0750 (1 tablet)-Given        0827 (1 tablet)-Given        0815 (1 tablet)-Given        0817 (1 tablet)-Given        0800 (1 tablet)-Given           Contraindications to both pharmacological and mechanical prophylaxis (must document contraindications for both in this order)  Freq: CONTINUOUS PRN Route: XX  Start: 10/03/17 0446   Admin Instructions:  Contraindications to both pharmacological and mechanical prophylaxis (must document contraindications for both in this order)               furosemide (LASIX) tablet 20 mg  Dose: 20 mg Freq: DAILY Route: PO  Start: 10/03/17 0800    0929 (20 mg)-Given        0743 (20 mg)-Given        0750 (20 mg)-Given        0827 (20 mg)-Given        0815 (20 mg)-Given        0817 (20 mg)-Given        0800 (20 mg)-Given           HYDROmorphone (DILAUDID) tablet 2 mg  Dose: 2 mg Freq: EVERY 6 HOURS SCHEDULED Route: PO  Start: 10/03/17 0630    0651 (2 mg)-Given       1147 (2 mg)-Given       1954 (2 mg)-Given        0202 (2 mg)-Given       0743 (2 mg)-Given       1428 (2 mg)-Given       2000 (2 mg)-Given        0208 (2 mg)-Given       0750 (2 mg)-Given       1409 (2 mg)-Given       1924 (2 mg)-Given        0159 (2 mg)-Given       0827 (2 mg)-Given       1423 (2 mg)-Given       2019 (2 mg)-Given        0158 (2 mg)-Given       0815 (2 mg)-Given       1409 (2 mg)-Given       2011 (2 mg)-Given        0208 (2 mg)-Given       0817 (2 mg)-Given       1444 (2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 well-appearing male, NAD, lying in bed.  HEENT: anicteric sclera, PERRL, EOMI, MMM, OP unobstructed; no cervical LAD  CV: RRR, nl S1/S2, no m/r/g  Lungs: equal b/l air entry, no wheezing, no crackles, no rhonchi,   Abd: Not examined  Ext: No edema; left leg wound is wrapped and was not examined.  Skin: no rashes, cyanosis, or jaundice  Neuro: non-focal[MH1.1]       Revision History        User Key Date/Time User Provider Type Action    > MH1.1 10/9/2017  8:08 AM Kirk Broussard MD Physician Sign            Progress Notes by Vivian Stewart MD at 10/8/2017 12:42 PM     Author:  Vivian Stewart MD Service:  General Medicine Author Type:  Physician    Filed:  10/8/2017 12:43 PM Date of Service:  10/8/2017 12:42 PM Creation Time:  10/8/2017 12:42 PM    Status:  Signed :  Vivian Stewart MD (Physician)           INTERNAL MEDICINE PROGRESS NOTE    Aubrey Duncan ([id number]) admitted on 10/2/2017  10/08/2017    Assessment & Plan:  This is a 62 year old male with a history of pulmonary transplant in  alpha1-anti trypsin deficiency " who presents with a non-healing wound of the left lower extremity.     #Chronic non-healing left leg wound  #Left leg cellulitis - now improving on zosyn  Onset in August after trauma to the left leg with piece of wood and nail causing two separate wounds in left lower leg.Initially treated at home with wound cares. Did not improve. Patient then went to PCP and was treated with about a week of augmentin. At that time per wife's pictures there was extensive eschar. Wound partially improved with augmentin, but about 4 days before admission he had wound suppuration and foul smelling. On arrival had xray without evidence of gas or bone involvement. Surgery not indicated. Believe wound did not completely healed because of partial antibiotic coverage. Patient initially started on vanc/zosyn. Wound culture grew E.faecalis, E. avium and Pseudomona putida. Both enterococci sensitive to penicillin. Pseudomonas sensitive to quinolones, cetaz, cefepime and  Zosyn. CRP initially elevated but now normal.  - Continue zosyn  - Will need antibiotic for total of 14 days until 10/16/17  - WOC following, wounds look improved  - Blood cultures negative to date    #Hx of Lung Transplant  #Leukopenia  At this moment WBC around his baseline.   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 Denies any new complaints. No pain, fevers, chills.     Objective:  Most recent vital signs:  /74 (BP Location: Right arm)  Pulse 64  Temp 96.7  F (35.9  C) (Oral)  Resp 16  Ht  "1.702 m (5' 7.01\")  Wt 74.1 kg (163 lb 6.4 oz)  SpO2 98%  BMI 25.59 kg/m2  Temp:  [96.7  F (35.9  C)-97.7  F (36.5  C)] 96.7  F (35.9  C)  Pulse:  [64-70] 64  Heart Rate:  [73] 73  Resp:  [16] 16  BP: (123-139)/(74-75) 138/74  SpO2:  [96 %-98 %] 98 %  Wt Readings from Last 2 Encounters:   10/03/17 74.1 kg (163 lb 6.4 oz)   04/19/17 74.8 kg (165 lb)     Intake/Output Summary (Last 24 hours) at 10/04/17 2008  Last data filed at 10/04/17 1859   Gross per 24 hour   Intake             1440 ml   Output                1 ml   Net             1439 ml     Physical exam:  General: Patient lying comfortably in bed, NAD   GI: NABS, NT/ND, no guarding or rebound  Extremities: wound wrapped today, no evidence of active bleed or discharge  Neuro: AOx3, moving all extremities spontaneously    Labs:  No results found for this or any previous visit (from the past 24 hour(s)).[ET1.1]                     Revision History        User Key Date/Time User Provider Type Action    > ET1.1 10/8/2017 12:43 PM Vivian Stewart MD Physician Sign            Progress Notes by Chad Pickens MD at 10/6/2017  6:42 PM     Author:  Chad Pickens MD Service:  General Medicine Author Type:  Resident    Filed:  10/6/2017  6:53 PM Date of Service:  10/6/2017  6:42 PM Creation Time:  10/6/2017  6:49 PM    Status:  Attested :  Chad Pickens MD (Resident)    Cosigner:  Vivian Stewart MD at 10/8/2017  8:30 AM        Attestation signed by Vivian Stewart MD at 10/8/2017  8:30 AM        Physician Attestation  I, Vivian Stewart MD, saw this patient with the resident and agree with the resident s findings and plan of care as documented in the resident s note with my edits.     I personally reviewed vital signs, medications, labs and imaging.    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     #Chronic non-healing left leg wound  #Left leg cellulitis  Onset in August after trauma to the left leg with piece of wood and nail causing two separate wounds in left lower leg.Initially treated at home with wound cares. Did not improve. Patient then went to PCP and was treated with about a week of augmentin. At that time per wife's pictures there was extensive eschar. Wound partially improved with augmentin, but about 4 days before admission he had wound suppuration and foul smelling. On arrival had xray without evidence of gas or bone involvement. Surgery not indicated. Believe wound not resolving because of partial coverage with augmentin. Patient initially started on vanc/zosyn. Wound culture grew E.faecalis, E. avium and Pseudomona putida. Both enterococci sensitive to penicillin. Pseudomonas sensitive to quinolones, cetaz, cefepime and  Zosyn.   - Discontinue vanc  - Continue zosyn per curbdise with ID  - Will need antibiotic for total of 14 days until 10/16/17  - CRP pending  - WOC following, wounds look improved  - Blood cultures negative    #Hx of Lung Transplant  #Leukopenia  At this moment WBC around his baseline.   - Continue PTA tacrolimus, prednisone, azathioprine  - Continue PTA Valganciclovir, bactrim  - Pulmonary transplant consulted  - Monitor WBC     #HTN  - PTA metoprolol, lisinopril     #Chronic Diarrhea  - PTA immodium     Hx of osteoporosis:  - PTA Fosamax      FEN: regular diet  Prophylaxis: DVT mechanical - start lovenox   Disposition: Pending TCU placement  Code Status: FULL    Chad Pickens MD PhD  Internal Medicine PGY2  [phone]    Patient was seen and discussed with  who agrees with above assessment and plan.    ==================================================================    Interval  "HistorySubjective:  No overnight events. Continues to have leg pain, but not worsening.     Objective:  Most recent vital signs:  /85 (BP Location: Right arm)  Pulse 81  Temp 97  F (36.1  C) (Oral)  Resp 16  Ht 1.702 m (5' 7.01\")  Wt 74.1 kg (163 lb 6.4 oz)  SpO2 97%  BMI 25.59 kg/m2  Temp:  [96.1  F (35.6  C)-97.5  F (36.4  C)] 97  F (36.1  C)  Pulse:  [60-81] 81  Heart Rate:  [68] 68  Resp:  [16] 16  BP: (139-152)/(77-85) 139/85  SpO2:  [97 %-99 %] 97 %  Wt Readings from Last 2 Encounters:   10/03/17 74.1 kg (163 lb 6.4 oz)   04/19/17 74.8 kg (165 lb)     Intake/Output Summary (Last 24 hours) at 10/04/17 2008  Last data filed at 10/04/17 1859   Gross per 24 hour   Intake             1440 ml   Output                1 ml   Net             1439 ml     Physical exam:  General: Patient lying comfortably in bed, NAD   HEENT: EOMI, PERRL, no scleral icterus  Cardiac: RRR, no m/r/g appreciated.   Respiratory: CTAB  GI: NABS, NT/ND, no guarding or rebound  Extremities: wound improving, no suppuration, less swelling and tenderness in surrounding area  Skin: No acute lesions appreciated  Neuro: AOx3, moving all extremities spontaneously    Labs:  No results found for this or any previous visit (from the past 24 hour(s)).[RC1.1]             Revision History        User Key Date/Time User Provider Type Action    > RC1.1 10/6/2017  6:53 PM Chad Pickens MD Resident Sign            Progress Notes by Vivian Stewart MD at 10/7/2017  1:36 PM     Author:  Vivian Stewart MD Service:  General Medicine Author Type:  Physician    Filed:  10/8/2017  8:30 AM Date of Service:  10/7/2017  1:36 PM Creation Time:  10/7/2017  1:44 PM    Status:  Signed :  Vivian Stewart MD (Physician)           INTERNAL MEDICINE PROGRESS NOTE    Aubrey Duncan ([id number]) admitted on 10/2/2017  10/07/2017    Assessment & Plan:  This is a 62 year old male with a history 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     #Chronic non-healing left leg wound  #Left leg cellulitis[RC1.1] - now improving on zosyn[ET1.1]  Onset in August after trauma to the left leg with piece of wood and nail causing two separate wounds in left lower leg.Initially treated at home with wound cares. Did not improve. Patient then went to PCP and was treated with about a week of augmentin. At that time per wife's pictures there was extensive eschar. Wound partially improved with augmentin, but about 4 days before admission he had wound suppuration and foul smelling. On arrival had xray without evidence of gas or bone involvement. Surgery not indicated. Believe wound did not completely healed because of partial antibiotic coverage. Patient initially started on vanc/zosyn. Wound culture grew E.faecalis, E. avium and Pseudomona putida. Both enterococci sensitive to penicillin. Pseudomonas sensitive to quinolones, cetaz, cefepime and  Zosyn. CRP initially elevated but now normal.  - Continue zosyn  - Will need antibiotic for total of 14 days until 10/16/17  - WOC following, wounds look improved  - Blood cultures negative to date    #Hx of Lung Transplant  #Leukopenia  At this moment WBC around his baseline.   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    ==================================================================    Interval HistorySubjective:  No overnight events. Continues to have leg pain, but improved.  "He is ambulating around the floor without issues.    Objective:  Most recent vital signs:  /72 (BP Location: Right arm)  Pulse 68  Temp 96.4  F (35.8  C) (Oral)  Resp 16  Ht 1.702 m (5' 7.01\")  Wt 74.1 kg (163 lb 6.4 oz)  SpO2 97%  BMI 25.59 kg/m2  Temp:  [96.4  F (35.8  C)-97.7  F (36.5  C)] 96.4  F (35.8  C)  Pulse:  [67-81] 68  Resp:  [16] 16  BP: (124-139)/(71-85) 132/72  SpO2:  [96 %-97 %] 97 %  Wt Readings from Last 2 Encounters:   10/03/17 74.1 kg (163 lb 6.4 oz)   04/19/17 74.8 kg (165 lb)     Intake/Output Summary (Last 24 hours) at 10/04/17 2008  Last data filed at 10/04/17 1859   Gross per 24 hour   Intake             1440 ml   Output                1 ml   Net             1439 ml     Physical exam:  General: Patient lying comfortably in bed, NAD   HEENT: EOMI, PERRL, no scleral icterus  Cardiac: RRR, no m/r/g appreciated.   Respiratory: CTAB  GI: NABS, NT/ND, no guarding or rebound  Extremities: wound wrapped today, no evidence of active bleed or discharge  Neuro: AOx3, moving all extremities spontaneously    Labs:  Results for orders placed or performed during the hospital encounter of 10/02/17 (from the past 24 hour(s))   CBC with platelets   Result Value Ref Range    WBC 3.1 (L) 4.0 - 11.0 10e9/L    RBC Count 3.68 (L) 4.4 - 5.9 10e12/L    Hemoglobin 12.0 (L) 13.3 - 17.7 g/dL    Hematocrit 37.5 (L) 40.0 - 53.0 %     (H) 78 - 100 fl    MCH 32.6 26.5 - 33.0 pg    MCHC 32.0 31.5 - 36.5 g/dL    RDW 13.5 10.0 - 15.0 %    Platelet Count 158 150 - 450 10e9/L   CRP inflammation   Result Value Ref Range    CRP Inflammation <2.9 0.0 - 8.0 mg/L[RC1.1]       Physician Attestation  I, Vivian Stewart MD, saw this patient with the resident and agree with the resident s findings and plan of care as documented in the resident s note with my edits.     I personally reviewed vital signs, medications, labs and imaging.    Vivian Stewart MD  Date of Service (when I saw the patient): " 10/7/17[ET1.1]                 Revision History        User Key Date/Time User Provider Type Action    > ET1.1 10/8/2017  8:30 AM Vivian Stewart MD Physician Sign     [N/A] 10/7/2017  1:47 PM Chad Pickens MD Resident Sign     RC1.1 10/7/2017  1:47 PM Chad Pickens MD Resident Sign                  Procedure Notes     No notes of this type exist for this encounter.      Progress Notes - Therapies (Notes from 10/06/17 through 10/09/17)     No notes of this type exist for this encounter.                                          INTERAGENCY TRANSFER FORM - LAB / IMAGING / EKG / EMG RESULTS   10/2/2017                       [address] [facility]: [phone]            Unresulted Labs (24h ago through future)    Start       Ordered    10/10/17 0600  Tacrolimus level (AM Draw)  AM DRAW,   Routine      10/09/17 0809         Lab Results - 3 Days      Creatinine [[id number]]  Resulted: 10/09/17 1020, Result status: In process    Ordering provider: Vivian Stewart MD  10/09/17 0949 Resulting lab: [facility]    Specimen Information    Type Source Collected On   Blood  10/09/17 1018            Tacrolimus level [[id number]]  Resulted: 10/09/17 0633, Result status: In process    Ordering provider: Vivian Stewart MD  10/09/17 0000 Resulting lab: [facility]    Specimen Information    Type Source Collected On   Blood  10/09/17 0631            Blood culture [[id number]]  Resulted: 10/09/17 0219, Result status: Final result    Ordering provider: Vivian Stewart MD  10/03/17 1039 Resulting lab: [facility]    Specimen Information    Type Source Collected On   Blood  10/03/17 1105   Comment:  Left Hand          Components       Value Reference Range Flag Lab   Specimen Description Blood Left Hand      Culture Micro No growth   75            Blood culture [[id number]]  Resulted: 10/09/17 0219, Result status: Final result    Ordering provider: Vivian Stewart MD  10/03/17 1039 Resulting lab: [facility]  BANK    Specimen Information    Type Source Collected On   Blood  10/03/17 1105   Comment:  Right Hand          Components       Value Reference Range Flag Lab   Specimen Description Blood Right Hand      Culture Micro No growth   75            CRP inflammation [[id number]]  Resulted: 10/07/17 0802, Result status: Final result    Ordering provider: Chad Pickens MD  10/07/17 0000 Resulting lab: [facility]    Specimen Information    Type Source Collected On   Blood  10/07/17 0724          Components       Value Reference Range Flag Lab   CRP Inflammation <2.9 0.0 - 8.0 mg/L  51            CBC with platelets [[id number]] (Abnormal)  Resulted: 10/07/17 0745, Result status: Final result    Ordering provider: Chad Pickens MD  10/07/17 0000 Resulting lab: [facility]    Specimen Information    Type Source Collected On   Blood  10/07/17 0724          Components       Value Reference Range Flag Lab   WBC 3.1 4.0 - 11.0 10e9/L L 51   RBC Count 3.68 4.4 - 5.9 10e12/L L 51   Hemoglobin 12.0 13.3 - 17.7 g/dL L 51   Hematocrit 37.5 40.0 - 53.0 % L 51    78 - 100 fl H 51   MCH 32.6 26.5 - 33.0 pg  51   MCHC 32.0 31.5 - 36.5 g/dL  51   RDW 13.5 10.0 - 15.0 %  51   Platelet Count 158 150 - 450 10e9/L  51            Testing Performed By     Lab - Abbreviation Name Director Address Valid Date Range    45 - [id number] [facility] Unknown Unknown 01/28/02 0000 - Present    51 - Unknown [facility] Unknown [address] [facility] [id number] 12/31/14 1010 - Present    75 - Unknown [facility] Unknown [address] [facility] [id number] 01/15/15 1019 - Present            Encounter-Level Documents:     There are no encounter-level documents.      Order-Level Documents:     There are no order-level documents.

## 2017-10-02 NOTE — ED NOTES
Bed: IN02  Expected date: 10/2/17  Expected time: 4:00 PM  Means of arrival:   Comments:  Aubrey Duncan  [birth year]  Left leg wound since 8/29, had a wound nurse at home, markedly worse over weekend. Lethargic

## 2017-10-02 NOTE — ED NOTES
Patient referred from [facility]. Patient injured left lower leg in middle of august after a board fell onto his leg. Wound from that injury has not healed, referred here for surgical evaluation and possible debridement. In triage, wound was covered with dressing, no obvious bleeding noted.

## 2017-10-02 NOTE — IP AVS SNAPSHOT
MRN:[id number]                      After Visit Summary   10/2/2017    Aubrey Duncan    MRN: [id number]           Thank you!     Thank you for choosing [facility] for your care. Our goal is always to provide you with excellent care. Hearing back from our patients is one way we can continue to improve our services. Please take a few minutes to complete the written survey that you may receive in the mail after you visit with us. Thank you!        Patient Information     Date Of Birth          1955        Designated Caregiver       Most Recent Value    Caregiver    Will someone help with your care after discharge? yes    Name of designated caregiver Kyung    Phone number of caregiver     Caregiver address [address] [address]      About your hospital stay     You were admitted on:  October 3, 2017 You last received care in the:  [address] [facility]    You were discharged on:  October 9, 2017        Reason for your hospital stay       You were hospitalized because of non-healing wound in left leg. You required IV antibiotics as this wound was infected. You will need 1 additional week of IV antibiotics.                  Who to Call     For medical emergencies, please call 911.  For non-urgent questions about your medical care, please call your primary care provider or clinic, [phone]          Attending Provider     Provider Specialty    Darwin Richards MD Emergency Medicine    [facility]    Joon Duncan MD Internal Medicine    Vivian Stewart MD Internal Medicine       Primary Care Provider Office Phone # Fax #    David Terrell Jiang [phone]      After Care Instructions     Activity - Up ad aviva           Diet       Follow this diet upon discharge: Orders Placed This Encounter      Regular Diet Adult            General info for SNF       Length of Stay Estimate: Short Term Care: Estimated # of Days  <30  Condition at Discharge: Improving  Level of care:board and care  Rehabilitation Potential: Fair  Admission H&P remains valid and up-to-date: Yes  Recent Chemotherapy: N/A  Use Nursing Home Standing Orders: Yes            Wound care       Site:   Left leg  Instructions:  Continue with daily wound cleaning and dressing changes. LLE wounds:Apply polymem foam dressing over both wounds (no need to cleanse wounds first)  Secure with tape or kerlix roll gauze.  Change daily.                  Follow-up Appointments     Follow Up and recommended labs and tests       Follow up with Nursing home physician.  No follow up labs or test are needed.  Follow up with primary care provider in 1 weeks.  No follow up labs or test are needed.    Continue to follow with your pulmonary transplant team as you have been doing before admission.                  Your next 10 appointments already scheduled     Oct 19, 2017  8:00 AM CDT   Lab with  LAB   [facility] ([facility])    [address] [place] [id number] [phone]            Oct 19, 2017  8:15 AM CDT   (Arrive by 8:00 AM)   XR CHEST 2 VIEWS with XR[address] [place] [address] Xray ([facility])    [address] [place] [id number] [phone]           Please bring a list of your current medicines to your exam. (Include vitamins, minerals and over-thecounter medicines.) Leave your valuables at home.  Tell your doctor if there is a chance you may be pregnant.  You do not need to do anything special for this exam.            Oct 19, 2017  8:30 AM CDT   DX HIP/PELVIS/SPINE with DX[address] [place] [address] [facility] ([facility])    [address] [place] [id number] [phone]           Please do not take any of the following 24 hours prior to the day of your exam: vitamins, calcium tablets, antacids.  If possible, please  wear clothes without metal (snaps, zippers). A sweatsuit works well.            Oct 19, 2017  9:00 AM CDT   SIX MINUTE WALK with UC PFL A, UC PFL 6 MINUTE WALK 2   [facility] Pulmonary Function Testing ([facility])    [address] [place] [id number]   [phone]            Oct 19, 2017 10:20 AM CDT   (Arrive by 10:05 AM)   Return Lung Transplant with Kirk Broussard MD   [facility] ([facility])    [address] [place] [id number]   [phone]            Oct 19, 2017 12:45 PM CDT   (Arrive by 12:30 PM)   Return Visit with Jonny Clifton MD   [facility] Dermatology ([facility])    [address] [place] [id number]   [phone]              Additional Services     Medication Therapy Management Referral       Reason for referral:  on more than 5 medications and managing chronic disease and on more than 10 medications and hospitalized or in the ED in the past 6 months     This service is designed to help you get the most from your medications.  A specially trained pharmacist will work closely with you and your doctors  to solve any problems related to your medications and to help you get the   best results from taking them.      The Medication Therapy Management staff will call you to schedule an appointment.                  Further instructions from your care team       We have scheduled an appt for you on Thursday 10/12 at 11:00 am with your Primary Care Provider, Dr. David Jiang. Please bring your insurance card, ID, and these discharge papers with you to this appointment. *At the end of this appointment, they will set up your weekly appointments to manage your wound.*  [facility]  [address] [place] [address]  Phone #: [phone]      Pending Results     Date and Time Order Name Status  "Description    10/9/2017 0949 Creatinine In process     10/9/2017 0000 Tacrolimus level In process     10/5/2017 1344 IR PICC Vascular In process             Statement of Approval     Ordered          10/09/17 1017  I have reviewed and agree with all the recommendations and orders detailed in this document.  EFFECTIVE NOW     Approved and electronically signed by:  Chad Pickens MD             Admission Information     Date & Time Provider Department Dept. Phone    10/2/2017 Vivian Stewart MD [address] [facility] [phone]      Your Vitals Were     Blood Pressure Pulse Temperature Respirations Height Weight    168/87 (BP Location: Right arm) 62 96.4  F (35.8  C) (Oral) 16 1.702 m (5' 7.01\") 74.1 kg (163 lb 6.4 oz)    Pulse Oximetry BMI (Body Mass Index)                97% 25.59 kg/m2          MyChart Information     [employer] gives you secure access to your electronic health record. If you see a primary care provider, you can also send messages to your care team and make appointments. If you have questions, please call your primary care clinic.  If you do not have a primary care provider, please call [phone] and they will assist you.        Care EveryWhere ID     This is your Care EveryWhere ID. This could be used by other organizations to access your [facility] medical records  [id number]        Equal Access to Services     MICHAEL CANTOR AH: Hadii mario Dalton, waaxda luqadaha, qaybta kaalmada valdemar, daniel machado. So [facility] [phone].    ATENCIÓN: Si habla español, tiene a neely disposición servicios gratuitos de asistencia lingüística. Llame al [phone].    We comply with applicable federal civil rights laws and [place] laws. We do not discriminate on the basis of race, color, national origin, age, disability, sex, sexual orientation, or gender identity.               Review of your medicines      START taking        Dose / Directions    HYDROmorphone 2 MG tablet "   Commonly known as:  DILAUDID   Used for:  Wound infection        Dose:  2 mg   Take 1 tablet (2 mg) by mouth every 6 hours as needed for moderate to severe pain   Quantity:  20 tablet   Refills:  0       order for DME   Used for:  Wound infection        Equipment being ordered: Polymem Non-Adhesive Pad Treatment Diagnosis: Wound infection   Quantity:  30 pad   Refills:  1       piperacillin-tazobactam 3-0.375 GM vial   Commonly known as:  ZOSYN   Indication:  Skin and Soft Tissue Infection   Used for:  Wound infection        Dose:  3.375 g   Inject 3.375 g into the vein every 6 hours for 7 days   Quantity:  40 each   Refills:  0         CONTINUE these medicines which may have CHANGED, or have new prescriptions. If we are uncertain of the size of tablets/capsules you have at home, strength may be listed as something that might have changed.        Dose / Directions    tacrolimus 1 MG capsule   Commonly known as:  GENERIC EQUIVALENT   This may have changed:  additional instructions   Used for:  Lung transplant status, bilateral (H)        Take 3 caps every am and 3 mg every pm.   Quantity:  180 capsule   Refills:  12         CONTINUE these medicines which have NOT CHANGED        Dose / Directions    albuterol 108 (90 BASE) MCG/ACT Inhaler   Commonly known as:  PROAIR HFA/PROVENTIL HFA/VENTOLIN HFA   Used for:  Wheezing        Dose:  2 puff   Inhale 2 puffs into the lungs every 6 hours as needed for shortness of breath / dyspnea or wheezing   Quantity:  3 Inhaler   Refills:  11       alendronate 70 MG tablet   Commonly known as:  FOSAMAX   Used for:  Osteopenia        Dose:  70 mg   Take 1 tablet (70 mg) by mouth every 7 days Take with 8 ounces water, at least 60 min before breakfast, and stay upright for at least 30 min after dose.   Quantity:  4 tablet   Refills:  12       azaTHIOprine 50 MG tablet   Commonly known as:  IMURAN   Used for:  Lung replaced by transplant (H)        Dose:  25 mg   Take 0.5 tablets (25  mg) by mouth daily   Quantity:  15 tablet   Refills:  11       calcium-vitamin D 600-400 MG-UNIT per tablet   Commonly known as:  CALTRATE   Used for:  Lung transplant status, bilateral (H)        Dose:  1 tablet   Take 1 tablet by mouth 2 times daily (with meals)   Quantity:  60 tablet   Refills:  12       furosemide 20 MG tablet   Commonly known as:  LASIX   Used for:  Hypertension        TAKE ONE TABLET BY MOUTH EVERY DAY   Quantity:  30 tablet   Refills:  11       LISINOPRIL PO        Dose:  5 mg   Take 5 mg by mouth daily   Refills:  0       loperamide 2 MG capsule   Commonly known as:  IMODIUM   Used for:  Lung transplant status, bilateral (H)        Dose:  2 mg   Take 1 capsule (2 mg) by mouth 4 times daily as needed for diarrhea   Quantity:  120 capsule   Refills:  12       metoprolol 25 MG tablet   Commonly known as:  LOPRESSOR   Used for:  Hypertension        TAKE ONE TABLET BY MOUTH TWICE A DAY   Quantity:  60 tablet   Refills:  12       multivitamin, therapeutic Tabs tablet   Used for:  Lung transplant status, bilateral (H)        Dose:  1 tablet   Take 1 tablet by mouth daily   Quantity:  30 tablet   Refills:  12       omeprazole 20 MG CR capsule   Commonly known as:  priLOSEC   Used for:  Lung transplant status, bilateral (H)        Dose:  20 mg   Take 1 capsule (20 mg) by mouth 2 times daily (before meals)   Quantity:  60 capsule   Refills:  12       predniSONE 5 MG tablet   Commonly known as:  DELTASONE   Used for:  Lung transplant status, bilateral (H)        TAKE ONE TABLET BY MOUTH EVERY MORNING AND TAKE ONE-HALF TABLET BY MOUTH EVERY EVENING   Quantity:  45 tablet   Refills:  12       sildenafil 25 MG tablet   Commonly known as:  VIAGRA   Used for:  ED (erectile dysfunction)        Dose:  25 mg   Take 1 tablet (25 mg) by mouth as needed for erectile dysfunction   Quantity:  30 tablet   Refills:  0       sulfamethoxazole-trimethoprim 400-80 MG per tablet   Commonly known as:  BACTRIM/SEPTRA   Used  for:  Lung replaced by transplant (H)        TAKE ONE TABLET BY MOUTH THREE TIMES A WEEK   Quantity:  12 tablet   Refills:  11       valGANciclovir 450 MG tablet   Commonly known as:  VALCYTE   Used for:  CMV (cytomegalovirus infection) (H), Lung replaced by transplant (H)        TAKE ONE TABLETS (450MG) BY MOUTH TWO TIMES A DAY   Quantity:  60 tablet   Refills:  11            Where to get your medicines      Some of these will need a paper prescription and others can be bought over the counter. Ask your nurse if you have questions.     Bring a paper prescription for each of these medications     HYDROmorphone 2 MG tablet    order for DME       You don't need a prescription for these medications     piperacillin-tazobactam 3-0.375 GM vial               ANTIBIOTIC INSTRUCTION     You've Been Prescribed an Antibiotic - Now What?  Your healthcare team thinks that you or your loved one might have an infection. Some infections can be treated with antibiotics, which are powerful, life-saving drugs. Like all medications, antibiotics have side effects and should only be used when necessary. There are some important things you should know about your antibiotic treatment.      Your healthcare team may run tests before you start taking an antibiotic.    Your team may take samples (e.g., from your blood, urine or other areas) to run tests to look for bacteria. These test can be important to determine if you need an antibiotic at all and, if you do, which antibiotic will work best.      Within a few days, your healthcare team might change or even stop your antibiotic.    Your team may start you on an antibiotic while they are working to find out what is making you sick.    Your team might change your antibiotic because test results show that a different antibiotic would be better to treat your infection.    In some cases, once your team has more information, they learn that you do not need an antibiotic at all. They may find out  that you don't have an infection, or that the antibiotic you're taking won't work against your infection. For example, an infection caused by a virus can't be treated with antibiotics. Staying on an antibiotic when you don't need it is more likely to be harmful than helpful.      You may experience side effects from your antibiotic.    Like all medications, antibiotics have side effects. Some of these can be serious.    Let you healthcare team know if you have any known allergies when you are admitted to the hospital.    One significant side effect of nearly all antibiotics is the risk of severe and sometimes deadly diarrhea caused by Clostridium difficile (C. Difficile). This occurs when a person takes antibiotics because some good germs are destroyed. Antibiotic use allows C. diificile to take over, putting patients at high risk for this serious infection.    As a patient or caregiver, it is important to understand your or your loved one's antibiotic treatment. It is especially important for caregivers to speak up when patients can't speak for themselves. Here are some important questions to ask your healthcare team.    What infection is this antibiotic treating and how do you know I have that infection?    What side effects might occur from this antibiotic?    How long will I need to take this antibiotic?    Is it safe to take this antibiotic with other medications or supplements (e.g., vitamins) that I am taking?     Are there any special directions I need to know about taking this antibiotic? For example, should I take it with food?    How will I be monitored to know whether my infection is responding to the antibiotic?    What tests may help to make sure the right antibiotic is prescribed for me?      Information provided by:  www.cdc.gov/getsmart  U.S. Department of Health and Human Services  Centers for disease Control and Prevention  National Center for Emerging and Zoonotic Infectious Diseases  Division of  Healthcare Quality Promotion         Protect others around you: Learn how to safely use, store and throw away your medicines at www.disposemymeds.org.             Medication List: This is a list of all your medications and when to take them. Check marks below indicate your daily home schedule. Keep this list as a reference.      Medications           Morning Afternoon Evening Bedtime As Needed    albuterol 108 (90 BASE) MCG/ACT Inhaler   Commonly known as:  PROAIR HFA/PROVENTIL HFA/VENTOLIN HFA   Inhale 2 puffs into the lungs every 6 hours as needed for shortness of breath / dyspnea or wheezing                                alendronate 70 MG tablet   Commonly known as:  FOSAMAX   Take 1 tablet (70 mg) by mouth every 7 days Take with 8 ounces water, at least 60 min before breakfast, and stay upright for at least 30 min after dose.                                azaTHIOprine 50 MG tablet   Commonly known as:  IMURAN   Take 0.5 tablets (25 mg) by mouth daily   Last time this was given:  25 mg on 10/8/2017  8:50 PM                                calcium-vitamin D 600-400 MG-UNIT per tablet   Commonly known as:  CALTRATE   Take 1 tablet by mouth 2 times daily (with meals)   Last time this was given:  1 tablet on 10/9/2017  8:00 AM                                furosemide 20 MG tablet   Commonly known as:  LASIX   TAKE ONE TABLET BY MOUTH EVERY DAY   Last time this was given:  20 mg on 10/9/2017  8:00 AM                                HYDROmorphone 2 MG tablet   Commonly known as:  DILAUDID   Take 1 tablet (2 mg) by mouth every 6 hours as needed for moderate to severe pain   Last time this was given:  2 mg on 10/9/2017  8:00 AM                                LISINOPRIL PO   Take 5 mg by mouth daily   Last time this was given:  5 mg on 10/9/2017  8:00 AM                                loperamide 2 MG capsule   Commonly known as:  IMODIUM   Take 1 capsule (2 mg) by mouth 4 times daily as needed for diarrhea   Last time  this was given:  2 mg on 10/9/2017  8:00 AM                                metoprolol 25 MG tablet   Commonly known as:  LOPRESSOR   TAKE ONE TABLET BY MOUTH TWICE A DAY   Last time this was given:  25 mg on 10/9/2017  8:00 AM                                multivitamin, therapeutic Tabs tablet   Take 1 tablet by mouth daily                                omeprazole 20 MG CR capsule   Commonly known as:  priLOSEC   Take 1 capsule (20 mg) by mouth 2 times daily (before meals)   Last time this was given:  20 mg on 10/9/2017  8:00 AM                                order for DME   Equipment being ordered: Polymem Non-Adhesive Pad Treatment Diagnosis: Wound infection                                piperacillin-tazobactam 3-0.375 GM vial   Commonly known as:  ZOSYN   Inject 3.375 g into the vein every 6 hours for 7 days   Last time this was given:  3.375 g on 10/9/2017  8:03 AM                                predniSONE 5 MG tablet   Commonly known as:  DELTASONE   TAKE ONE TABLET BY MOUTH EVERY MORNING AND TAKE ONE-HALF TABLET BY MOUTH EVERY EVENING   Last time this was given:  5 mg on 10/9/2017  8:00 AM                                sildenafil 25 MG tablet   Commonly known as:  VIAGRA   Take 1 tablet (25 mg) by mouth as needed for erectile dysfunction                                sulfamethoxazole-trimethoprim 400-80 MG per tablet   Commonly known as:  BACTRIM/SEPTRA   TAKE ONE TABLET BY MOUTH THREE TIMES A WEEK   Last time this was given:  1 tablet on 10/9/2017  8:00 AM                                tacrolimus 1 MG capsule   Commonly known as:  GENERIC EQUIVALENT   Take 3 caps every am and 3 mg every pm.   Last time this was given:  3 mg on 10/9/2017  8:00 AM                                valGANciclovir 450 MG tablet   Commonly known as:  VALCYTE   TAKE ONE TABLETS (450MG) BY MOUTH TWO TIMES A DAY   Last time this was given:  450 mg on 10/9/2017  9:38 AM

## 2017-10-02 NOTE — IP AVS SNAPSHOT
[address] [address]    Phone:  [phone]                                       After Visit Summary   10/2/2017    Aubrey Duncan    MRN: [id number]           After Visit Summary Signature Page     I have received my discharge instructions, and my questions have been answered. I have discussed any challenges I see with this plan with the nurse or doctor.    ..........................................................................................................................................  Patient/Patient Representative Signature      ..........................................................................................................................................  Patient Representative Print Name and Relationship to Patient    ..................................................               ................................................  Date                                            Time    ..........................................................................................................................................  Reviewed by Signature/Title    ...................................................              ..............................................  Date                                                            Time

## 2017-10-02 NOTE — TELEPHONE ENCOUNTER
"Aubrey's wife Kyung called with concerns for a worsening left leg wound Aubrey has as a result of an injury sustained 8/29 while working on a home improvement.  He originally was seen by PCP who referred Aubrey to a wound specialist.  He had an Augmentin course and was treating wound with santyl debridement creme and regular visits with wound care nurse.  Kyung reports his wound looked \"ok\" on Thursday 9/28 but on 9/29 she noted blackened areas.  She stated with his dressing change today the would was malodorous with an increase in blackened areas within the wound and redness around the wound.  She reports Aubrey's temperature is increased from baseline and is notably more tired.  He has been taking dilaudid and tylenol and reports an increase in wound pain as well.  Given the changes Kyung reports she will be coming the emergency department to have him seen.    "

## 2017-10-03 ENCOUNTER — APPOINTMENT (OUTPATIENT)
Dept: ULTRASOUND IMAGING | Facility: CLINIC | Age: 62
DRG: 603 | End: 2017-10-03
Attending: INTERNAL MEDICINE
Payer: MEDICARE

## 2017-10-03 ENCOUNTER — AMBULATORY - GICH (OUTPATIENT)
Dept: SCHEDULING | Facility: OTHER | Age: 62
End: 2017-10-03

## 2017-10-03 ENCOUNTER — COMMUNICATION - GICH (OUTPATIENT)
Dept: INTERNAL MEDICINE | Facility: OTHER | Age: 62
End: 2017-10-03

## 2017-10-03 ENCOUNTER — APPOINTMENT (OUTPATIENT)
Dept: GENERAL RADIOLOGY | Facility: CLINIC | Age: 62
DRG: 603 | End: 2017-10-03
Attending: EMERGENCY MEDICINE
Payer: MEDICARE

## 2017-10-03 PROBLEM — S81.809A WOUND OF LOWER EXTREMITY: Status: ACTIVE | Noted: 2017-10-03

## 2017-10-03 LAB
CRP SERPL-MCNC: 8.6 MG/L (ref 0–8)
POTASSIUM SERPL-SCNC: 4.7 MMOL/L (ref 3.4–5.3)
TACROLIMUS BLD-MCNC: 10.1 UG/L (ref 5–15)
TME LAST DOSE: NORMAL H

## 2017-10-03 PROCEDURE — 25000128 H RX IP 250 OP 636: Performed by: STUDENT IN AN ORGANIZED HEALTH CARE EDUCATION/TRAINING PROGRAM

## 2017-10-03 PROCEDURE — 93922 UPR/L XTREMITY ART 2 LEVELS: CPT

## 2017-10-03 PROCEDURE — 84132 ASSAY OF SERUM POTASSIUM: CPT | Performed by: EMERGENCY MEDICINE

## 2017-10-03 PROCEDURE — 36415 COLL VENOUS BLD VENIPUNCTURE: CPT | Performed by: INTERNAL MEDICINE

## 2017-10-03 PROCEDURE — 87040 BLOOD CULTURE FOR BACTERIA: CPT | Performed by: INTERNAL MEDICINE

## 2017-10-03 PROCEDURE — 25000132 ZZH RX MED GY IP 250 OP 250 PS 637: Mod: GY | Performed by: STUDENT IN AN ORGANIZED HEALTH CARE EDUCATION/TRAINING PROGRAM

## 2017-10-03 PROCEDURE — 25000128 H RX IP 250 OP 636: Performed by: EMERGENCY MEDICINE

## 2017-10-03 PROCEDURE — 25000128 H RX IP 250 OP 636: Performed by: INTERNAL MEDICINE

## 2017-10-03 PROCEDURE — A9270 NON-COVERED ITEM OR SERVICE: HCPCS | Mod: GY | Performed by: STUDENT IN AN ORGANIZED HEALTH CARE EDUCATION/TRAINING PROGRAM

## 2017-10-03 PROCEDURE — 86140 C-REACTIVE PROTEIN: CPT | Performed by: EMERGENCY MEDICINE

## 2017-10-03 PROCEDURE — 25000131 ZZH RX MED GY IP 250 OP 636 PS 637: Mod: GY | Performed by: STUDENT IN AN ORGANIZED HEALTH CARE EDUCATION/TRAINING PROGRAM

## 2017-10-03 PROCEDURE — 99212 OFFICE O/P EST SF 10 MIN: CPT

## 2017-10-03 PROCEDURE — 25000125 ZZHC RX 250: Performed by: STUDENT IN AN ORGANIZED HEALTH CARE EDUCATION/TRAINING PROGRAM

## 2017-10-03 PROCEDURE — 12000001 ZZH R&B MED SURG/OB UMMC

## 2017-10-03 PROCEDURE — 99223 1ST HOSP IP/OBS HIGH 75: CPT | Mod: AI | Performed by: INTERNAL MEDICINE

## 2017-10-03 PROCEDURE — 73590 X-RAY EXAM OF LOWER LEG: CPT | Mod: LT

## 2017-10-03 RX ORDER — SULFAMETHOXAZOLE AND TRIMETHOPRIM 400; 80 MG/1; MG/1
1 TABLET ORAL
Status: DISCONTINUED | OUTPATIENT
Start: 2017-10-04 | End: 2017-10-09 | Stop reason: HOSPADM

## 2017-10-03 RX ORDER — HYDROMORPHONE HYDROCHLORIDE 2 MG/1
2 TABLET ORAL EVERY 6 HOURS SCHEDULED
Status: DISCONTINUED | OUTPATIENT
Start: 2017-10-03 | End: 2017-10-09 | Stop reason: HOSPADM

## 2017-10-03 RX ORDER — FUROSEMIDE 20 MG
20 TABLET ORAL DAILY
Status: DISCONTINUED | OUTPATIENT
Start: 2017-10-03 | End: 2017-10-09 | Stop reason: HOSPADM

## 2017-10-03 RX ORDER — LOPERAMIDE HCL 2 MG
2 CAPSULE ORAL 4 TIMES DAILY PRN
Status: DISCONTINUED | OUTPATIENT
Start: 2017-10-03 | End: 2017-10-08

## 2017-10-03 RX ORDER — VALGANCICLOVIR 450 MG/1
450 TABLET, FILM COATED ORAL 2 TIMES DAILY
Status: DISCONTINUED | OUTPATIENT
Start: 2017-10-03 | End: 2017-10-09 | Stop reason: HOSPADM

## 2017-10-03 RX ORDER — PREDNISONE 5 MG/1
5 TABLET ORAL 2 TIMES DAILY WITH MEALS
Status: DISCONTINUED | OUTPATIENT
Start: 2017-10-03 | End: 2017-10-09 | Stop reason: HOSPADM

## 2017-10-03 RX ORDER — ALBUTEROL SULFATE 90 UG/1
2 AEROSOL, METERED RESPIRATORY (INHALATION) EVERY 6 HOURS PRN
Status: DISCONTINUED | OUTPATIENT
Start: 2017-10-03 | End: 2017-10-09 | Stop reason: HOSPADM

## 2017-10-03 RX ORDER — TACROLIMUS 1 MG/1
3 CAPSULE ORAL 2 TIMES DAILY
Status: DISCONTINUED | OUTPATIENT
Start: 2017-10-03 | End: 2017-10-09 | Stop reason: HOSPADM

## 2017-10-03 RX ORDER — PIPERACILLIN SODIUM, TAZOBACTAM SODIUM 3; .375 G/15ML; G/15ML
3.38 INJECTION, POWDER, LYOPHILIZED, FOR SOLUTION INTRAVENOUS ONCE
Status: COMPLETED | OUTPATIENT
Start: 2017-10-03 | End: 2017-10-03

## 2017-10-03 RX ORDER — PIPERACILLIN SODIUM, TAZOBACTAM SODIUM 3; .375 G/15ML; G/15ML
3.38 INJECTION, POWDER, LYOPHILIZED, FOR SOLUTION INTRAVENOUS EVERY 6 HOURS
Status: DISCONTINUED | OUTPATIENT
Start: 2017-10-03 | End: 2017-10-09 | Stop reason: HOSPADM

## 2017-10-03 RX ORDER — METOPROLOL TARTRATE 25 MG/1
25 TABLET, FILM COATED ORAL 2 TIMES DAILY
Status: DISCONTINUED | OUTPATIENT
Start: 2017-10-03 | End: 2017-10-09 | Stop reason: HOSPADM

## 2017-10-03 RX ORDER — ALENDRONATE SODIUM 70 MG/1
70 TABLET ORAL WEEKLY
Status: DISCONTINUED | OUTPATIENT
Start: 2017-10-09 | End: 2017-10-03 | Stop reason: CLARIF

## 2017-10-03 RX ORDER — NALOXONE HYDROCHLORIDE 0.4 MG/ML
.1-.4 INJECTION, SOLUTION INTRAMUSCULAR; INTRAVENOUS; SUBCUTANEOUS
Status: DISCONTINUED | OUTPATIENT
Start: 2017-10-03 | End: 2017-10-09 | Stop reason: HOSPADM

## 2017-10-03 RX ORDER — SODIUM CHLORIDE, SODIUM LACTATE, POTASSIUM CHLORIDE, CALCIUM CHLORIDE 600; 310; 30; 20 MG/100ML; MG/100ML; MG/100ML; MG/100ML
INJECTION, SOLUTION INTRAVENOUS CONTINUOUS
Status: ACTIVE | OUTPATIENT
Start: 2017-10-03 | End: 2017-10-03

## 2017-10-03 RX ORDER — LISINOPRIL 2.5 MG/1
2.5 TABLET ORAL DAILY
Status: DISCONTINUED | OUTPATIENT
Start: 2017-10-03 | End: 2017-10-04

## 2017-10-03 RX ADMIN — PIPERACILLIN AND TAZOBACTAM 3.38 G: 3; .375 INJECTION, POWDER, LYOPHILIZED, FOR SOLUTION INTRAVENOUS; PARENTERAL at 02:43

## 2017-10-03 RX ADMIN — HYDROMORPHONE HYDROCHLORIDE 2 MG: 2 TABLET ORAL at 19:54

## 2017-10-03 RX ADMIN — LISINOPRIL 2.5 MG: 2.5 TABLET ORAL at 09:30

## 2017-10-03 RX ADMIN — VANCOMYCIN HYDROCHLORIDE 1250 MG: 10 INJECTION, POWDER, LYOPHILIZED, FOR SOLUTION INTRAVENOUS at 05:11

## 2017-10-03 RX ADMIN — METOPROLOL TARTRATE 25 MG: 25 TABLET, FILM COATED ORAL at 09:30

## 2017-10-03 RX ADMIN — Medication 25 MG: at 09:30

## 2017-10-03 RX ADMIN — METOPROLOL TARTRATE 25 MG: 25 TABLET, FILM COATED ORAL at 19:54

## 2017-10-03 RX ADMIN — VALGANCICLOVIR HYDROCHLORIDE 450 MG: 450 TABLET ORAL at 19:54

## 2017-10-03 RX ADMIN — PREDNISONE 5 MG: 5 TABLET ORAL at 09:28

## 2017-10-03 RX ADMIN — PIPERACILLIN AND TAZOBACTAM 3.38 G: 3; .375 INJECTION, POWDER, LYOPHILIZED, FOR SOLUTION INTRAVENOUS; PARENTERAL at 20:02

## 2017-10-03 RX ADMIN — OMEPRAZOLE 20 MG: 20 CAPSULE, DELAYED RELEASE ORAL at 09:29

## 2017-10-03 RX ADMIN — PIPERACILLIN AND TAZOBACTAM 3.38 G: 3; .375 INJECTION, POWDER, LYOPHILIZED, FOR SOLUTION INTRAVENOUS; PARENTERAL at 14:33

## 2017-10-03 RX ADMIN — LOPERAMIDE HYDROCHLORIDE 2 MG: 2 CAPSULE ORAL at 19:59

## 2017-10-03 RX ADMIN — PIPERACILLIN AND TAZOBACTAM 3.38 G: 3; .375 INJECTION, POWDER, LYOPHILIZED, FOR SOLUTION INTRAVENOUS; PARENTERAL at 09:29

## 2017-10-03 RX ADMIN — Medication 1 TABLET: at 09:28

## 2017-10-03 RX ADMIN — TACROLIMUS 3 MG: 1 CAPSULE ORAL at 09:28

## 2017-10-03 RX ADMIN — FUROSEMIDE 20 MG: 20 TABLET ORAL at 09:29

## 2017-10-03 RX ADMIN — TACROLIMUS 3 MG: 1 CAPSULE ORAL at 19:53

## 2017-10-03 RX ADMIN — HYDROMORPHONE HYDROCHLORIDE 2 MG: 2 TABLET ORAL at 06:51

## 2017-10-03 RX ADMIN — PREDNISONE 5 MG: 5 TABLET ORAL at 19:53

## 2017-10-03 RX ADMIN — SODIUM CHLORIDE, POTASSIUM CHLORIDE, SODIUM LACTATE AND CALCIUM CHLORIDE: 600; 310; 30; 20 INJECTION, SOLUTION INTRAVENOUS at 11:50

## 2017-10-03 RX ADMIN — VALGANCICLOVIR HYDROCHLORIDE 450 MG: 450 TABLET ORAL at 09:30

## 2017-10-03 RX ADMIN — HYDROMORPHONE HYDROCHLORIDE 2 MG: 2 TABLET ORAL at 11:47

## 2017-10-03 RX ADMIN — VANCOMYCIN HYDROCHLORIDE 1250 MG: 10 INJECTION, POWDER, LYOPHILIZED, FOR SOLUTION INTRAVENOUS at 15:58

## 2017-10-03 RX ADMIN — OMEPRAZOLE 20 MG: 20 CAPSULE, DELAYED RELEASE ORAL at 15:58
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
## 2017-10-03 NOTE — PROGRESS NOTES
Care Coordinator Progress Note     Admission Date/Time:  10/2/2017  Attending MD:  Joon Duncan MD     Data  Chart reviewed, discussed with interdisciplinary team.   Patient was admitted for:    Wound infection  Hyperkalemia.    Concerns with insurance coverage for discharge needs: None.  Current Living Situation: Patient lives with spouse.  Support System: Supportive and Involved  Services Involved: No services involved prior to admission.   Transportation: No services   Barriers to Discharge: medical clearance    Assessment  Pt is a 62 year old male with PMH significant for bilateral lung transplant (2013) and HTN who presented for evaluation of left leg wound. Met with pt and wife, Kyung, to introduce RNCC role and discuss discharge planning. Pt stated that his wife was doing daily dressing changes at home and the wound has not been limiting his mobility. Pt stated that he got prescriptions for wound supplies from wound MD and pt was getting these supplies from [email or web address] (phone: [phone]).WOC RN consulted and writer will look for recommendations. Pt has been on/off oral antibiotics for the wound too. Per MD team, pt will likely be able to discharge on oral antibiotics. Will continue to follow plan of care.      Plan  Anticipated Discharge Date:  1-2 days  Anticipated Discharge Plan:  Discharge to home with wound cares and oral antibiotics    Felicity Ernandez RN

## 2017-10-03 NOTE — PROGRESS NOTES
[facility] Nurse Inpatient Adult WoundAssessment    Initial Assessment  Reason for consultation: Evaluate and treat LLE wounds     Assessment:   Immunosuppressed pt with LLE trauma wounds with necrotic tissue, infection improving after initiation of IV antibiotics. Recommend using a dressing that will both debride and keep wound from alternating from too dry to too moist.     Photo:       TREATMENT  PLAN    LLE wounds:  Start- Apply polymem foam dressing over both wounds (no need to cleanse wounds first)  Secure with tape or kerlix roll gauze.  Change daily.   Orders Written  WO Nurse follow-up plan:weekly  Nursing to notify the Provider(s) and re-consult the WO Nurse if wound(s) deteriorates or new skin concern.    Patient History  According to provider note(s):  62 year old male with a history of pulmonary transplant in  alpha1-anti trypsin deficiency who presents with a non-healing wound of the left lower extremity.  Wound gram stain showed gram positive cocci and gram negative rods. X-ray showed no evidence of osteomyelitis or gas production. Poor wound healing potentially due to immunosuppression, need to rule out peripheral arterial disease.   Objective Data     Current Diet/ Nutrition:    Active Diet Order      Regular Diet Adult    Output:      Skin Assessment: Stevenson Stevenson Score  Av.8  Min: 12  Max: 23                                 Labs:   Recent Labs  Lab 10/02/17  2029   HGB 13.7   WBC 3.2*     TC pending       Recent Labs  Lab 10/02/17  2032   CULT PENDING       Physical Exam    Skin assessment:   Focused skin inspection: LLE:  Skin with minor chronic venous staining, PP 3/4 with foot appropriate warm and capillary refill 1-2 seconds.  No edema noted in foot, 1+ pitting edema in ankle     Wound Location:  Lateral and posterior LLE  Wound History: originally a skin tear from 2017.  Followed by wound care nurse in clinic.  Latest treatment is Santyl ointment for debridement and Allevyn cover  dressing.  Wound drainage varies considerably from very little to saturating the foam dressing in 24 hours. Recently completed 10 day course of Augmentin with little improvement, drainage remained copious with foul odor.  Now receiving Vanc/Zozyn, pain controlled with hydromorphone     Measurements (length x width x depth, in cm)    Superior/lateral:      4.8 cm x 3.4 cm  x  0.3 cm with wound base 40% dry black eschar and 60% granulation tissue   Inferior/posterior:    3.9 cm x 0.9 cm  x  0.4 cm with wound base 100% dry black eschar   Tunneling N/A  Undermining N/A  Palpation of the wound bed: normal   Periwound skin: intact, slightly indurated and inflamed with pink/red erythema   Temperature: normal   Drainage:, scant  Description of drainage: serosanguinous from superior/medial wound   Odor: none  Pain: sharp pain when cleansing wound     Interventions  Current support surface: Standard  Atmos Air    Visual inspection of wound(s) completed  Wound Care:was done per plan of care    Cleansing with microklenz solution  Supplies: Ordered: Polymem  Education provided today: POC    Discussed plan of care with Patient, Family and Nurse    Face to face time: 20 minutes

## 2017-10-03 NOTE — PHARMACY-ADMISSION MEDICATION HISTORY
Admission medication history interview status for the 10/2/2017 admission is complete. See Epic admission navigator for allergy information, pharmacy, prior to admission medications and immunization status.     Medication history interview sources:  Patient interview    Changes made to PTA medication list (reason)  Added: N/A  Deleted: caclium- vitamin D 600-400 tablets (duplicate)  Changed: N/A    Additional medication history information (including reliability of information, actions taken by pharmacist):Patient is a good historian. Of note, he typically takes his second daily dose of prednisone at noon to help with his sleep. All of his last doses were yesterday unless administered here at [facility]. Tacrolimus dose verified with patient- he is taking GENERIC tacrolimus. Bactrim is dosed on Monday, Wednesday and Friday (last dose was yesterday). He did have his flu shot last week    Prior to Admission medications    Medication Sig Last Dose Taking? Auth Provider   LISINOPRIL PO Take 5 mg by mouth daily 10/2/2017 at Unknown time Yes Unknown, Entered By History   sulfamethoxazole-trimethoprim (BACTRIM/SEPTRA) 400-80 MG per tablet TAKE ONE TABLET BY MOUTH THREE TIMES A WEEK 10/2/2017 at 0800 Yes Kirk Broussard MD   predniSONE (DELTASONE) 5 MG tablet TAKE ONE TABLET BY MOUTH EVERY MORNING AND TAKE ONE-HALF TABLET BY MOUTH EVERY EVENING 10/2/2017 at 1200 Yes iKrk Broussard MD   azaTHIOprine (IMURAN) 50 MG tablet Take 0.5 tablets (25 mg) by mouth daily 10/2/2017 at 0800 Yes Kirk Broussard MD   tacrolimus (PROGRAF - GENERIC EQUIVALENT) 1 MG capsule Take 3 caps by mouth twice daily in the morning and at night  10/2/2017 at 2000 Yes Kirk Broussard MD   metoprolol (LOPRESSOR) 25 MG tablet TAKE ONE TABLET BY MOUTH TWICE A DAY 10/2/2017 at 2000 Yes Kirk Broussard MD   alendronate (FOSAMAX) 70 MG tablet Take 1 tablet (70 mg) by mouth every 7 days Take with 8 ounces water, at least 60  min before breakfast, and stay upright for at least 30 min after dose. 10/2/2017 at 0800 Yes Kirk Broussard MD   furosemide (LASIX) 20 MG tablet TAKE ONE TABLET BY MOUTH EVERY DAY 10/2/2017 at 0800 Yes Kirk Broussard MD   albuterol (PROAIR HFA, PROVENTIL HFA, VENTOLIN HFA) 108 (90 BASE) MCG/ACT inhaler Inhale 2 puffs into the lungs every 6 hours as needed for shortness of breath / dyspnea or wheezing More than a month Yes Kirk Broussard MD   valGANciclovir (VALCYTE) 450 MG tablet TAKE ONE TABLETS (450MG) BY MOUTH TWO TIMES A DAY 10/2/2017 at 0800 Yes Kirk Broussard MD   loperamide (IMODIUM) 2 MG capsule Take 1 capsule (2 mg) by mouth 4 times daily as needed for diarrhea 10/2/2017 at 2000 Yes Chong Bishop MD   omeprazole (PRILOSEC) 20 MG capsule Take 1 capsule (20 mg) by mouth 2 times daily (before meals) 10/2/2017 at 2000 Yes Valentino Cristina MD   calcium-vitamin D (CALTRATE) 600-400 MG-UNIT per tablet Take 1 tablet by mouth 2 times daily (with meals) 10/2/2017 at   Yes Anson Ornelas MD   multivitamin, therapeutic (THERA-VIT) TABS Take 1 tablet by mouth daily 10/2/2017 at 0800 Yes Anson Ornelas MD   sildenafil (VIAGRA) 25 MG tablet Take 1 tablet (25 mg) by mouth as needed for erectile dysfunction More than a month at Unknown time  Kirk Broussard MD           Medication history completed by: Robbin Perea, PharmD Candidate     I have reviewed and agree with the pharmacy student's note.    Gabe Silverman, PharmD  PGY-1 Resident

## 2017-10-03 NOTE — ED PROVIDER NOTES
"    [facility] EMERGENCY DEPARTMENT ([facility])    History     Chief Complaint   Patient presents with     Wound Infection     HPI  Aubrey Duncan is a 62 year old male with a PMHx that includes bilateral lung transplant (2013) and HTN who presents for evaluation of a left leg wound. Patient states that he injured the leg about one month ago and was seen locally in [place], [place]. He was referred to a wound specialist who has been following the wound as an outpatient. He has been on a course of amoxicillin/clavulanate previously. He was seen today by his wound physician who recommended he be seen by a surgeon for a possible debridement of the wound, as it has developed a foul odor, drainage, and worsening eschar. Given that his lung transplant was here, he decided to present for surgical evaluation this evening. He does have some pain in the area. He denies fever, chills, chest pain, shortness of breath, nausea, vomiting, abdominal pain, or significant surrounding erythema at the wound site.     I have reviewed the Medications, Allergies, Past Medical and Surgical History, and Social History in the Epic system.    Review of Systems  A 14-point review of systems was completed and was otherwise negative unless stated above in the HPI.    Physical Exam   BP: 125/73  Pulse: 71  Heart Rate: 59  Temp: 98.3  F (36.8  C)  Resp: 16  Height: 170.2 cm (5' 7\")  SpO2: 98 %  Physical Exam  GENERAL: Well-appearing, no acute distress.  HENT:    Head: Normocephalic. Atraumatic.   Ears: External ears normal, hearing grossly intact.   Nose: No external deformities.   Throat/Mouth: Moist oral mucosa. Posterior oropharynx is clear.  EYES: Pupils equal, round, reactive. No conjunctival pallor, sclerae clear. EOMI.  CV: Regular rate, regular rhythm. Warm and well perfused.  PULMONARY: Effort normal. No accessory muscle use. Good air movement. No stridor. Lung sounds clear bilaterally with no wheezing, rhonchi, or rales.  GI: Soft, " non-distended, non-tender to palpation.  NEURO: Alert, awake. Oriented x3. No focal sensory loss or muscular weakness. No facial drooping, no slurring of speech.  MSK:    Neck: Supple.   Extremities: No gross deformity. Coordinated movement of all extremities.  INTEGUMENTARY: Warm. No diaphoresis. Two wounds located on the left lower leg, one more posterior and one more anterior. Both have yellow drainage and a central area of necrosis. There is an odor. There is no surrounding erythema or induration.  PSYCH: Appropriate affect. Behavior is normal. Linear thought process. Normal insight.    ED Course     Procedures       EKG Interpretation:      Interpreted by Darwin Richards  Time reviewed: 2200  Symptoms at time of EKG: Hyperkalemia   Rhythm: normal sinus   Rate: Normal  Axis: Normal  Ectopy: none  Conduction: normal  ST Segments/ T Waves: T wave inversion I and aVL  Q Waves: III, aVf and inferior leads  Comparison to prior: Previous ECG was in 2013, the evidence of likely old/chronic inferior infarct is new    Clinical Impression: NSR, no evidence of hyperkalemic changes, age-indeterminate inferior infarct is new since 2013.    Critical Care time:  none  Labs Ordered and Resulted from Time of ED Arrival Up to the Time of Departure from the ED   CBC WITH PLATELETS DIFFERENTIAL - Abnormal; Notable for the following:        Result Value    WBC 3.2 (*)     RBC Count 4.07 (*)      (*)     MCH 33.7 (*)     Absolute Neutrophil 1.1 (*)     All other components within normal limits   BASIC METABOLIC PANEL - Abnormal; Notable for the following:     Potassium 5.7 (*)     Glucose 151 (*)     All other components within normal limits   GRAM STAIN - Abnormal; Notable for the following:     Gram Stain   (*)     Value: Many  Gram positive cocci      Gram Stain   (*)     Value: Moderate  Gram negative rods      All other components within normal limits   LACTIC ACID WHOLE BLOOD   TACROLIMUS LEVEL   POTASSIUM    WOUND CULTURE AEROBIC BACTERIAL     Assessments & Plan (with Medical Decision Making)   Primary Evaluation:  Patient was seen and examined in the Emergency Department and was noted to be in no acute distress. Initial vital signs demonstrated no abnormalities. Airway was patent and protected. Breathing was intact. Hemodynamics were stable.    Patient's history was reviewed in the chart and with the patient.    MDM and Disposition:  Patient presented for evaluation of chronic wounds on his left leg in the setting of immunosuppression. He was recommended for possible surgical debridement by a physician in [place] hometown [place], however given that his transplant team is here, he presented for evaluation at [facility].    An IV was placed and labs were drawn. Lactate was normal. Patient was mildly leukopenic at 3.2. A tacrolimus level was sent. BMP demonstrated hyperkalemia with a potassium of 5.7, although unclear etiology. Possible lab error. ECG demonstrated no ECG findings consistent with hyperkalemia. Wound was cultured, gram stain demonstrated many GPCs and moderate GNRs. He was given 500 mL IVF and 20mg IV furosemide for his hyperkalemia.    General surgery was consulted and came down to see the patient. They did not feel as though he required emergent surgical debridement, but felt that admission was reasonable for continued wound cares and possible vascular surgery work up. Given the gram stain findings, the patient was treated empirically for a wound infection with vancomycin and pip/tazo. Repeat potasium was normal at 4.7.    Given the patient's clinical history, physical exam, and results of our diagnostic work-up, the decision was made to admit the patient to the hospital in stable condition for further evaluation and treatment of wound infections on the lower leg. This was discussed with the patient and all who were present were in agreement with the plan. Report was called to the admitting team who  accepted the patient. The patient will be admitted to a general medical floor under the internal medicine service.    Patient remained hemodynamically stable throughout his stay in the Emergency Department.    Final Clinical Impression:  Wound infection, possible  Hyperkalemia    I have reviewed the nursing notes.  I have reviewed the findings, diagnosis, plan and need for follow up with the patient.  Darwin Richards DO  Emergency Medicine  Pager: [phone]    Final diagnoses:   Wound infection   Hyperkalemia     10/2/2017   [facility], EMERGENCY DEPARTMENT     Darwin Richards MD  10/03/17 0222

## 2017-10-03 NOTE — PLAN OF CARE
Problem: Patient Care Overview  Goal: Plan of Care/Patient Progress Review     Patient remains A&O, pleasant and up ad aviva reporting managed LL pain with scheduled PO dilaudid. Pt now on regular diet after confirmed no procedure planning with surgery. WOC consult completed- daily dressing ordered next due tomorrow. LR @ 100; IV Zosyn Q6hrs. Plan of care determinate by antibiotic therapy.

## 2017-10-03 NOTE — PROGRESS NOTES
GENERAL SURGERY PROGRESS NOTE    Patient: Aubrey Duncan  MRN: [id number]    Subjective  No acute overnight events. Pain well controlled. Voiding. +BMs. ***    Objective  Temp:  [97.1  F (36.2  C)-98.3  F (36.8  C)] 97.1  F (36.2  C)  Pulse:  [67-71] 67  Heart Rate:  [59-69] 66  Resp:  [9-18] 18  BP: (115-159)/(73-87) 158/86  SpO2:  [93 %-100 %] 99 %    I/O last 3 completed shifts:  In: 603.33 [P.O.:360; I.V.:243.33]  Out: -     General: Alert, interactive, NAD  Resp: Breathing comfortably on RA  Cardiac: regular rate and rhythm  Abdomen: Soft, nontender, nondistended.    Ext: LLE with 4x3 cm wound on lateral aspect of leg and smaller 4x1 cm wound on posterior aspect of his leg.  Both wounds have some black, necrotic material quite superficial. Not purulent exudate from these necrotic areas.  Remaining portions of the wounds show healthy, presence of red granulation tissue.  Patient has moderate edema in bilateral legs, worse on the left.  There are skin color changes associated with venous stasis on both legs. Pulses palpable on both feet.     Labs: Reviewed.  Potassium: 4.7   CRP: 8.6  Blood culture 1:  NGTD  Blood culture 2:  NGTD    Imaging: Reviewed.  A. 2 views left tibia/fibula radiographs 10/3/2017 2:08 AM      1. No subcutaneous emphysema or radiographic evidence of osteomyelitis.       2. No acute osseous abnormality    Assessment & Plan  - Pain control: PO dilaudid  - Diet: Pt regular diet   - Wound Cares: Start- Apply polymem foam dressing over both wounds (no need to cleanse wounds first)  Secure with tape or kerlix roll gauze.  Change daily.   - Drains: Scant   - DVT prophylaxis: ***  - Anticipated discharge: ***    Discussed with staff.    --  Sam Barcenas MD  Gen Surg PGY1

## 2017-10-03 NOTE — PROGRESS NOTES
"General Surgery Progress Note  Aubrey Duncan  [id number]  10/3/2017    Subjective:  No acute issues overnight. No increased pain or erythema to the LLE wounds.     Objective:  /62  Pulse 67  Temp 97.5  F (36.4  C) (Oral)  Resp 16  Ht 1.702 m (5' 7.01\")  Wt 74.1 kg (163 lb 6.4 oz)  SpO2 95%  BMI 25.59 kg/m2    I/O last 3 completed shifts:  In: 603.33 [P.O.:360; I.V.:243.33]  Out: -     NAD, conversant, resting in bed  NLB on RA  LLE: lateral and posterior wounds with small areas of stable bed necrosis, otherwise healthy beefy red granulation tissue. Minimal erythema around wound edges. No change in wound sizes. Remains shallow.     Labs: Reviewed.    Assessment/Plan:  62M s/p lung transplants presenting with LLE wounds with delayed wound healing.    - No indication for acute surgical intervention.  - Agree with WOCN consult.  - General Surgery will sign off. Please call with any questions.    Seen and discussed with staff, Dr. Rogers.      Royal Colno MD  PGY-2 General Surgery        "

## 2017-10-03 NOTE — PLAN OF CARE
Problem: Patient Care Overview  Goal: Plan of Care/Patient Progress Review  Pt admitted from ED at 0400 for infected left leg wound. Pt has two wounds on left lower extremity that pt reports has been getting worse since August. Writer applied new dressing. Pt on IV vanco as well as zoysn. Pt reports hx of lung tx in 2013. Pt accompanied by wife Kyung who will assume role of primary care giver when pt discharged. Pt has PIV in the right arm infusing TKO in between abx. Surgery to consult for further interventions, pt placed NPO reports last thing to eat or drink was 2000 10/2. Pt K initially elevated in ED however last recheck 4.7. Will continue to monitor w/ POC.

## 2017-10-03 NOTE — PHARMACY-VANCOMYCIN DOSING SERVICE
Pharmacy Vancomycin Initial Note  Date of Service October 3, 2017  Patient's  1955  62 year old, male    Indication: Skin and Soft Tissue Infection    Current estimated CrCl = Estimated Creatinine Clearance: 74.5 mL/min (based on Cr of 1.06).    Creatinine for last 3 days  10/2/2017:  8:29 PM Creatinine 1.06 mg/dL    Recent Vancomycin Level(s) for last 3 days  No results found for requested labs within last 72 hours.      Vancomycin IV Administrations (past 72 hours)      No vancomycin orders with administrations in past 72 hours.                Nephrotoxins and other renal medications (Future)    Start     Dose/Rate Route Frequency Ordered Stop    10/03/17 0330  vancomycin (VANCOCIN) 1,250 mg in NaCl 0.9 % 250 mL intermittent infusion      1,250 mg  over 90 Minutes Intravenous EVERY 12 HOURS 10/03/17 0243      10/03/17 0207  piperacillin-tazobactam (ZOSYN) 3.375 g vial to attach to  mL bag     Comments:  DO NOT USE THIS FIELD FOR ADMIN INSTRUCTIONS; INFORMATION DOES NOT SHOW ON MAR. USE THE FIELD ABOVE MARKED ADMIN INSTRUCTIONS    3.375 g  100 mL/hr over 1 Hours Intravenous ONCE 10/03/17 0207            Contrast Orders - past 72 hours     None            Plan:  1.  Start vancomycin 1250 mg IV q12h.   2.  Goal Trough Level: 10-15 mg/L   3.  Pharmacy will check trough levels as appropriate in 1-3 Days.    4. Serum creatinine levels will be ordered daily for the first week of therapy and at least twice weekly for subsequent weeks.    5. [place] method utilized to dose vancomycin therapy: Method 2    Svitlana Meza, PharmD, BCPS

## 2017-10-04 ENCOUNTER — COMMUNICATION - GICH (OUTPATIENT)
Dept: INTERNAL MEDICINE | Facility: OTHER | Age: 62
End: 2017-10-04

## 2017-10-04 LAB
ANION GAP SERPL CALCULATED.3IONS-SCNC: 8 MMOL/L (ref 3–14)
BUN SERPL-MCNC: 24 MG/DL (ref 7–30)
CALCIUM SERPL-MCNC: 8.4 MG/DL (ref 8.5–10.1)
CHLORIDE SERPL-SCNC: 108 MMOL/L (ref 94–109)
CHOLEST SERPL-MCNC: 183 MG/DL
CO2 SERPL-SCNC: 23 MMOL/L (ref 20–32)
CREAT SERPL-MCNC: 1.07 MG/DL (ref 0.66–1.25)
CULTURE - HISTORICAL: ABNORMAL
CULTURE - HISTORICAL: ABNORMAL
CULTURE - HISTORICAL: NORMAL
ERYTHROCYTE [DISTWIDTH] IN BLOOD BY AUTOMATED COUNT: 13.5 % (ref 10–15)
GFR SERPL CREATININE-BSD FRML MDRD: 70 ML/MIN/1.7M2
GLUCOSE SERPL-MCNC: 163 MG/DL (ref 70–99)
GRAM STAIN: ABNORMAL
GRAM STAIN: NORMAL
HCT VFR BLD AUTO: 36.1 % (ref 40–53)
HDLC SERPL-MCNC: 40 MG/DL
HGB BLD-MCNC: 11.8 G/DL (ref 13.3–17.7)
INTERPRETATION ECG - MUSE: NORMAL
LDLC SERPL CALC-MCNC: 82 MG/DL
MCH RBC QN AUTO: 32.6 PG (ref 26.5–33)
MCHC RBC AUTO-ENTMCNC: 32.7 G/DL (ref 31.5–36.5)
MCV RBC AUTO: 100 FL (ref 78–100)
NONHDLC SERPL-MCNC: 143 MG/DL
PLATELET # BLD AUTO: 157 10E9/L (ref 150–450)
POTASSIUM SERPL-SCNC: 4.8 MMOL/L (ref 3.4–5.3)
RBC # BLD AUTO: 3.62 10E12/L (ref 4.4–5.9)
SODIUM SERPL-SCNC: 139 MMOL/L (ref 133–144)
SPECIMEN DESCRIPTION - HISTORICAL: ABNORMAL
SPECIMEN DESCRIPTION - HISTORICAL: NORMAL
SUSCEPTIBILITY RESULT - HISTORICAL: ABNORMAL
TRIGL SERPL-MCNC: 308 MG/DL
WBC # BLD AUTO: 2.6 10E9/L (ref 4–11)

## 2017-10-04 PROCEDURE — 25000132 ZZH RX MED GY IP 250 OP 250 PS 637: Mod: GY | Performed by: STUDENT IN AN ORGANIZED HEALTH CARE EDUCATION/TRAINING PROGRAM

## 2017-10-04 PROCEDURE — 80048 BASIC METABOLIC PNL TOTAL CA: CPT | Performed by: STUDENT IN AN ORGANIZED HEALTH CARE EDUCATION/TRAINING PROGRAM

## 2017-10-04 PROCEDURE — 25000125 ZZHC RX 250: Performed by: STUDENT IN AN ORGANIZED HEALTH CARE EDUCATION/TRAINING PROGRAM

## 2017-10-04 PROCEDURE — 80061 LIPID PANEL: CPT | Performed by: STUDENT IN AN ORGANIZED HEALTH CARE EDUCATION/TRAINING PROGRAM

## 2017-10-04 PROCEDURE — 25000128 H RX IP 250 OP 636: Performed by: EMERGENCY MEDICINE

## 2017-10-04 PROCEDURE — 85027 COMPLETE CBC AUTOMATED: CPT | Performed by: STUDENT IN AN ORGANIZED HEALTH CARE EDUCATION/TRAINING PROGRAM

## 2017-10-04 PROCEDURE — 25000131 ZZH RX MED GY IP 250 OP 636 PS 637: Mod: GY | Performed by: STUDENT IN AN ORGANIZED HEALTH CARE EDUCATION/TRAINING PROGRAM

## 2017-10-04 PROCEDURE — A9270 NON-COVERED ITEM OR SERVICE: HCPCS | Mod: GY | Performed by: STUDENT IN AN ORGANIZED HEALTH CARE EDUCATION/TRAINING PROGRAM

## 2017-10-04 PROCEDURE — 99232 SBSQ HOSP IP/OBS MODERATE 35: CPT | Mod: GC | Performed by: INTERNAL MEDICINE

## 2017-10-04 PROCEDURE — 25000128 H RX IP 250 OP 636: Performed by: STUDENT IN AN ORGANIZED HEALTH CARE EDUCATION/TRAINING PROGRAM

## 2017-10-04 PROCEDURE — 36415 COLL VENOUS BLD VENIPUNCTURE: CPT | Performed by: STUDENT IN AN ORGANIZED HEALTH CARE EDUCATION/TRAINING PROGRAM

## 2017-10-04 PROCEDURE — 12000001 ZZH R&B MED SURG/OB UMMC

## 2017-10-04 PROCEDURE — 40000141 ZZH STATISTIC PERIPHERAL IV START W/O US GUIDANCE

## 2017-10-04 RX ORDER — LISINOPRIL 5 MG/1
5 TABLET ORAL DAILY
Status: DISCONTINUED | OUTPATIENT
Start: 2017-10-05 | End: 2017-10-09 | Stop reason: HOSPADM

## 2017-10-04 RX ORDER — ATORVASTATIN CALCIUM 40 MG/1
40 TABLET, FILM COATED ORAL DAILY
Status: DISCONTINUED | OUTPATIENT
Start: 2017-10-05 | End: 2017-10-09 | Stop reason: HOSPADM

## 2017-10-04 RX ADMIN — OMEPRAZOLE 20 MG: 20 CAPSULE, DELAYED RELEASE ORAL at 07:41

## 2017-10-04 RX ADMIN — TACROLIMUS 3 MG: 1 CAPSULE ORAL at 07:44

## 2017-10-04 RX ADMIN — VALGANCICLOVIR HYDROCHLORIDE 450 MG: 450 TABLET ORAL at 21:44

## 2017-10-04 RX ADMIN — LISINOPRIL 2.5 MG: 2.5 TABLET ORAL at 07:44

## 2017-10-04 RX ADMIN — PIPERACILLIN AND TAZOBACTAM 3.38 G: 3; .375 INJECTION, POWDER, LYOPHILIZED, FOR SOLUTION INTRAVENOUS; PARENTERAL at 21:26

## 2017-10-04 RX ADMIN — METOPROLOL TARTRATE 25 MG: 25 TABLET, FILM COATED ORAL at 20:00

## 2017-10-04 RX ADMIN — TACROLIMUS 3 MG: 1 CAPSULE ORAL at 20:00

## 2017-10-04 RX ADMIN — HYDROMORPHONE HYDROCHLORIDE 2 MG: 2 TABLET ORAL at 02:02

## 2017-10-04 RX ADMIN — FUROSEMIDE 20 MG: 20 TABLET ORAL at 07:43

## 2017-10-04 RX ADMIN — PIPERACILLIN AND TAZOBACTAM 3.38 G: 3; .375 INJECTION, POWDER, LYOPHILIZED, FOR SOLUTION INTRAVENOUS; PARENTERAL at 03:49

## 2017-10-04 RX ADMIN — PREDNISONE 5 MG: 5 TABLET ORAL at 07:43

## 2017-10-04 RX ADMIN — LOPERAMIDE HYDROCHLORIDE 2 MG: 2 CAPSULE ORAL at 21:26

## 2017-10-04 RX ADMIN — HYDROMORPHONE HYDROCHLORIDE 2 MG: 2 TABLET ORAL at 20:00

## 2017-10-04 RX ADMIN — VANCOMYCIN HYDROCHLORIDE 1250 MG: 10 INJECTION, POWDER, LYOPHILIZED, FOR SOLUTION INTRAVENOUS at 06:13

## 2017-10-04 RX ADMIN — HYDROMORPHONE HYDROCHLORIDE 2 MG: 2 TABLET ORAL at 14:28

## 2017-10-04 RX ADMIN — HYDROMORPHONE HYDROCHLORIDE 2 MG: 2 TABLET ORAL at 07:43

## 2017-10-04 RX ADMIN — LOPERAMIDE HYDROCHLORIDE 2 MG: 2 CAPSULE ORAL at 17:35

## 2017-10-04 RX ADMIN — PIPERACILLIN AND TAZOBACTAM 3.38 G: 3; .375 INJECTION, POWDER, LYOPHILIZED, FOR SOLUTION INTRAVENOUS; PARENTERAL at 15:05

## 2017-10-04 RX ADMIN — Medication 1 TABLET: at 07:44

## 2017-10-04 RX ADMIN — VANCOMYCIN HYDROCHLORIDE 1250 MG: 10 INJECTION, POWDER, LYOPHILIZED, FOR SOLUTION INTRAVENOUS at 17:58

## 2017-10-04 RX ADMIN — SULFAMETHOXAZOLE AND TRIMETHOPRIM 1 TABLET: 400; 80 TABLET ORAL at 10:22

## 2017-10-04 RX ADMIN — Medication 25 MG: at 07:44

## 2017-10-04 RX ADMIN — VALGANCICLOVIR HYDROCHLORIDE 450 MG: 450 TABLET ORAL at 07:44

## 2017-10-04 RX ADMIN — METOPROLOL TARTRATE 25 MG: 25 TABLET, FILM COATED ORAL at 07:43

## 2017-10-04 RX ADMIN — PIPERACILLIN AND TAZOBACTAM 3.38 G: 3; .375 INJECTION, POWDER, LYOPHILIZED, FOR SOLUTION INTRAVENOUS; PARENTERAL at 10:22

## 2017-10-04 RX ADMIN — PREDNISONE 5 MG: 5 TABLET ORAL at 17:35

## 2017-10-04 RX ADMIN — OMEPRAZOLE 20 MG: 20 CAPSULE, DELAYED RELEASE ORAL at 17:35

## 2017-10-04 NOTE — PROGRESS NOTES
[facility], [place]    Internal Medicine Progress Note - [facility] Service    Main Plans for Today       Awaiting wound cx and susceptibility results    Assessment & Plan   Aubrey Duncan is a 62 year old male admitted on 10/2/2017. He has a history of pulmonary transplant in 2013 2/2 alpha1-antitrypsin deficiency and is admitted for a non-healing left lower extremity wound.     #Non-healing wound of left lower extremity, complicated by cellulitis: Onset in August after trauma to the left leg. Despite wound cares and a ten day course of Augmentin, the wound has not healed. Wound gram stain showed gram positive cocci and gram negative rods. X-ray showed no evidence of osteomyelitis or gas production. Has a history of a non-healing leg wound 3 years ago in the right leg, that resolved with prolonged IV antibiotics. Wound cultures at that time grew out Pseudomonas sensitive to FQ, zosyn and meropenem. Poor wound healing potentially due to immunosuppression, need to rule out peripheral arterial disease. CRP 8.6 on 10/3.    - Broad antibiotics, vanc / zosyn, awaiting wound culture and susceptibility results    - Surgery consulted, appreciate recommendations    - TC final results pending    - Oral dilaudid for pain control (has been taking this prior to admission by outside prescriber)     #Hyperkalemia, resolved: Patient had an elevated potassium of 5.7 in the emergency department, resolved with a one time dose of lasix and fluid bolus. Unclear etiology, possibly due to lisinopril but he is on a small dose. He has never had an elevated potassium before per my chart review. Potassium 4.7 (10/3), 4.8 (10/4).     #Hx of Lung Transplant: Patient is doing well from a lung transplant stand point. Denies any shortness of breath, does not require any supplemental O2. Tacrolimus level of 10.1 on 10/2.     - Continue PTA tacrolimus, prednisone, azathioprine     - Continue PTA valganciclovir, bactrim     -  Pulmonary transplant consult     #HTN:      - Given pt.'s ASCVD of 15%, prescribing atorvastatin 40 mg QD     - PTA metoprolol, lisinopril     #Chronic Diarrhea     - PTA immodium     #Hx of osteoporosis:     - PTA Fosamax     FEN: Full normal diet  Lines: PIV  DVT: Compression stockings applied  Code status: FULL  Dispo: Likely discharge in 1-2 days following wound culture results and sensitivities    Disposition Plan   Expected discharge: 2 - 3 days, recommended to prior living arrangement once antibiotic plan established.     Entered: Savanah Goff 10/04/2017, 1:50 PM   Information in the above section will display in the discharge planner report.      The patient's care was discussed with the Attending Physician, Dr. Vivian Stewart, Patient, Patient's Family and [place] [id number] Team.    Savanah Goff, MS4  [facility]: Team 4  Pager: [phone]  Please see sticky note for cross cover information    Interval History   No acute events overnight. Patient reports sleeping 10 hours last evening with minimal interruption. Adequate PO intake. Denies fevers, chills, night sweats, headache, chest pain, abdominal pain, increased leg edema, increased leg pain or SOB. Reports chronic diarrhea at baseline, currently well managed on immodium.     Physical Exam   Vital Signs: Temp: 97.1  F (36.2  C) Temp src: Oral BP: 152/74 Pulse: 68 Heart Rate: 69 Resp: 16 SpO2: 98 % O2 Device: None (Room air)    Weight: 163 lbs 6.4 oz  General: Well appearing male lying in bed with lower extremity raised. Patient calm, cooperative and answering questions appropriately.  Respiratory: Lungs clear to auscultation, no wheezes, rales or rhonchi.  Cardiovascular: RRR. No murmur, rubs or gallops.   GI: Abdomen nontender to palpation. No hepatomegaly or splenomegaly.  Skin: 5x5 in. erythematous wound present on anterior left lower extremity, 1x5 in. wound present on posterior left lower extremity. No edema, necrotic tissue  or non-serous discharge present.     Data   Medications     - MEDICATION INSTRUCTIONS -         vancomycin (VANCOCIN) IV  1,250 mg Intravenous Q12H     azaTHIOprine  25 mg Oral Daily     calcium-vitamin D  1 tablet Oral Daily     furosemide  20 mg Oral Daily     lisinopril  2.5 mg Oral Daily     metoprolol  25 mg Oral BID     omeprazole  20 mg Oral BID AC     predniSONE  5 mg Oral BID w/meals     sulfamethoxazole-trimethoprim  1 tablet Oral Once per day on Mon Wed Fri     tacrolimus  3 mg Oral BID     valGANciclovir  450 mg Oral BID     piperacillin-tazobactam  3.375 g Intravenous Q6H     HYDROmorphone  2 mg Oral Q6H TABITHA     Data     Recent Labs  Lab 10/04/17  0707 10/03/17  0057 10/02/17  2029   WBC 2.6*  --  3.2*   HGB 11.8*  --  13.7     --  102*     --  197     --  137   POTASSIUM 4.8 4.7 5.7*   CHLORIDE 108  --  105   CO2 23  --  25   BUN 24  --  30   CR 1.07  --  1.06   ANIONGAP 8  --  7   ERIN 8.4*  --  9.2   *  --  151*     No results found for this or any previous visit (from the past 24 hour(s)).

## 2017-10-04 NOTE — PLAN OF CARE
Problem: Patient Care Overview  Goal: Plan of Care/Patient Progress Review  Outcome: No Change  Pt A&Ox4.  Up independently and able to make needs known.  VSS on RA.  L PIV infusing TKO between scheduled IV zosyn & vanco.  PO dilaudid given as scheduled, pt reports pain is comfortable.  LLE dressing CDI, WOC orders in place.  Denies SOB or nausea.  Good appetite, regular diet.  Voiding without difficulty.  C/o diarrhea before bed, PRN imodium given.  Continue to monitor & follow POC.

## 2017-10-04 NOTE — PLAN OF CARE
Problem: Patient Care Overview  Goal: Plan of Care/Patient Progress Review  Outcome: No Change  VSS, RA. A&O. Up independently. Regular diet. No complaints of nausea, no emesis. NS infusing at TKO to PIV. IV antibiotics continued. Voiding wnl's. No stools reported. Endorses pain to LLE; scheduled Dilaudid 2 mg PO given. Dressing to LLE wound CDI. US TC doppler in process.

## 2017-10-05 ENCOUNTER — HOSPITAL ENCOUNTER (INPATIENT)
Dept: VASCULAR ULTRASOUND | Facility: CLINIC | Age: 62
DRG: 603 | End: 2017-10-05
Attending: INTERNAL MEDICINE
Payer: MEDICARE

## 2017-10-05 LAB
BACTERIA SPEC CULT: ABNORMAL
ERYTHROCYTE [DISTWIDTH] IN BLOOD BY AUTOMATED COUNT: 13.6 % (ref 10–15)
HCT VFR BLD AUTO: 37.8 % (ref 40–53)
HGB BLD-MCNC: 12.2 G/DL (ref 13.3–17.7)
Lab: ABNORMAL
MCH RBC QN AUTO: 33 PG (ref 26.5–33)
MCHC RBC AUTO-ENTMCNC: 32.3 G/DL (ref 31.5–36.5)
MCV RBC AUTO: 102 FL (ref 78–100)
PLATELET # BLD AUTO: 154 10E9/L (ref 150–450)
RBC # BLD AUTO: 3.7 10E12/L (ref 4.4–5.9)
SPECIMEN SOURCE: ABNORMAL
WBC # BLD AUTO: 3.2 10E9/L (ref 4–11)

## 2017-10-05 PROCEDURE — 36569 INSJ PICC 5 YR+ W/O IMAGING: CPT

## 2017-10-05 PROCEDURE — 25000125 ZZHC RX 250: Performed by: STUDENT IN AN ORGANIZED HEALTH CARE EDUCATION/TRAINING PROGRAM

## 2017-10-05 PROCEDURE — 12000001 ZZH R&B MED SURG/OB UMMC

## 2017-10-05 PROCEDURE — A9270 NON-COVERED ITEM OR SERVICE: HCPCS | Mod: GY | Performed by: STUDENT IN AN ORGANIZED HEALTH CARE EDUCATION/TRAINING PROGRAM

## 2017-10-05 PROCEDURE — 25000132 ZZH RX MED GY IP 250 OP 250 PS 637: Mod: GY | Performed by: STUDENT IN AN ORGANIZED HEALTH CARE EDUCATION/TRAINING PROGRAM

## 2017-10-05 PROCEDURE — 25000128 H RX IP 250 OP 636: Performed by: EMERGENCY MEDICINE

## 2017-10-05 PROCEDURE — 99232 SBSQ HOSP IP/OBS MODERATE 35: CPT | Mod: GC | Performed by: INTERNAL MEDICINE

## 2017-10-05 PROCEDURE — 85027 COMPLETE CBC AUTOMATED: CPT | Performed by: STUDENT IN AN ORGANIZED HEALTH CARE EDUCATION/TRAINING PROGRAM

## 2017-10-05 PROCEDURE — 25000131 ZZH RX MED GY IP 250 OP 636 PS 637: Mod: GY | Performed by: STUDENT IN AN ORGANIZED HEALTH CARE EDUCATION/TRAINING PROGRAM

## 2017-10-05 PROCEDURE — 36415 COLL VENOUS BLD VENIPUNCTURE: CPT | Performed by: STUDENT IN AN ORGANIZED HEALTH CARE EDUCATION/TRAINING PROGRAM

## 2017-10-05 PROCEDURE — 25000128 H RX IP 250 OP 636: Performed by: STUDENT IN AN ORGANIZED HEALTH CARE EDUCATION/TRAINING PROGRAM

## 2017-10-05 PROCEDURE — 27210208 ZZH KIT VALVED DOUBLE LUMEN

## 2017-10-05 RX ADMIN — PIPERACILLIN AND TAZOBACTAM 3.38 G: 3; .375 INJECTION, POWDER, LYOPHILIZED, FOR SOLUTION INTRAVENOUS; PARENTERAL at 08:56

## 2017-10-05 RX ADMIN — LOPERAMIDE HYDROCHLORIDE 2 MG: 2 CAPSULE ORAL at 17:54

## 2017-10-05 RX ADMIN — PREDNISONE 5 MG: 5 TABLET ORAL at 17:54

## 2017-10-05 RX ADMIN — LISINOPRIL 5 MG: 5 TABLET ORAL at 07:50

## 2017-10-05 RX ADMIN — OMEPRAZOLE 20 MG: 20 CAPSULE, DELAYED RELEASE ORAL at 16:23

## 2017-10-05 RX ADMIN — HYDROMORPHONE HYDROCHLORIDE 2 MG: 2 TABLET ORAL at 02:08

## 2017-10-05 RX ADMIN — PIPERACILLIN AND TAZOBACTAM 3.38 G: 3; .375 INJECTION, POWDER, LYOPHILIZED, FOR SOLUTION INTRAVENOUS; PARENTERAL at 21:00

## 2017-10-05 RX ADMIN — OMEPRAZOLE 20 MG: 20 CAPSULE, DELAYED RELEASE ORAL at 07:50

## 2017-10-05 RX ADMIN — TACROLIMUS 3 MG: 1 CAPSULE ORAL at 07:50

## 2017-10-05 RX ADMIN — HYDROMORPHONE HYDROCHLORIDE 2 MG: 2 TABLET ORAL at 19:24

## 2017-10-05 RX ADMIN — VALGANCICLOVIR HYDROCHLORIDE 450 MG: 450 TABLET ORAL at 21:00

## 2017-10-05 RX ADMIN — METOPROLOL TARTRATE 25 MG: 25 TABLET, FILM COATED ORAL at 19:24

## 2017-10-05 RX ADMIN — HYDROMORPHONE HYDROCHLORIDE 2 MG: 2 TABLET ORAL at 14:09

## 2017-10-05 RX ADMIN — PREDNISONE 5 MG: 5 TABLET ORAL at 07:50

## 2017-10-05 RX ADMIN — ATORVASTATIN CALCIUM 40 MG: 40 TABLET, FILM COATED ORAL at 07:50

## 2017-10-05 RX ADMIN — VANCOMYCIN HYDROCHLORIDE 1250 MG: 10 INJECTION, POWDER, LYOPHILIZED, FOR SOLUTION INTRAVENOUS at 06:24

## 2017-10-05 RX ADMIN — LOPERAMIDE HYDROCHLORIDE 2 MG: 2 CAPSULE ORAL at 07:58

## 2017-10-05 RX ADMIN — LIDOCAINE HYDROCHLORIDE 2 ML: 20 INJECTION, SOLUTION INFILTRATION; PERINEURAL at 14:59

## 2017-10-05 RX ADMIN — TACROLIMUS 3 MG: 1 CAPSULE ORAL at 19:24

## 2017-10-05 RX ADMIN — VALGANCICLOVIR HYDROCHLORIDE 450 MG: 450 TABLET ORAL at 07:51

## 2017-10-05 RX ADMIN — PIPERACILLIN AND TAZOBACTAM 3.38 G: 3; .375 INJECTION, POWDER, LYOPHILIZED, FOR SOLUTION INTRAVENOUS; PARENTERAL at 16:22

## 2017-10-05 RX ADMIN — Medication 1 TABLET: at 07:50

## 2017-10-05 RX ADMIN — FUROSEMIDE 20 MG: 20 TABLET ORAL at 07:50

## 2017-10-05 RX ADMIN — Medication 25 MG: at 07:50

## 2017-10-05 RX ADMIN — METOPROLOL TARTRATE 25 MG: 25 TABLET, FILM COATED ORAL at 07:50

## 2017-10-05 RX ADMIN — PIPERACILLIN AND TAZOBACTAM 3.38 G: 3; .375 INJECTION, POWDER, LYOPHILIZED, FOR SOLUTION INTRAVENOUS; PARENTERAL at 03:35

## 2017-10-05 RX ADMIN — HYDROMORPHONE HYDROCHLORIDE 2 MG: 2 TABLET ORAL at 07:50

## 2017-10-05 NOTE — PLAN OF CARE
Problem: Patient Care Overview  Goal: Plan of Care/Patient Progress Review  Outcome: No Change  VSS, RA. A&O. Up independently. Regular diet.  No complaints of nausea, no emesis. NS infusing at TKO to PIV for antibiotic use. Voiding wnl's. No stools reported; PRN imodium given at HS per patient request. Complaints of discomfort to LLE; scheduled Dilaudid 2 mg PO given Q6hrs. Dressing to LLE wound CDI.

## 2017-10-05 NOTE — PROGRESS NOTES
[facility], [place]   Antimicrobial Management Team (AMT) Note            To: Ludwin Michael  Unit: [id number]  Allergies:     Brief Summary: Aubrey Duncan is a 56 year old with PMH of alpha-1-antitrypsin deficiency, basal cell carcinoma, CMV infection (treated with valganciclovir), DVT of upper extremity 9/2013, esophageal spasm and stricture, heparin-induced thrombocytopenia, hypertension, bilateral lung transplant 9/2013, squamous cell carcinoma, and steroid-induced diabetes mellitus was admitted on 10/2 with non-healing lower left extremity wound cellulitis.  HPI: Trauma to left leg caused by a piece of wood with a nail that occurred in August 2017. Patient attempted to manage the wound at home, but did not improve. He completed a 10-day course of Augmentin, but only showed minimal improvement to the wound. Reported purulence, foul smell, and pain at the site of infection that began a few days prior to his admission at [facility]. Patient was afebrile (98.3F), respiratory rate of 17, heart rate of 71, blood pressure of 144/79 mmHg, and CRP slightly elevated (8.6). On 10/3, ANC (1.1) and WBC (3.2) were both low, but chart review shows WBC is considered to be his baseline. On 10/3, wound cultures were collected from the left leg and 1/1 positive for heavy growth of Enterococcus faecalis, Enterococcus avium, and light growth of Pseudomonas fluorescens putida. On 10/3, blood cultures were drawn peripherally (left and right hand) and 2/2 negative for growth. On admission, patient was empirically started on Zosyn and vancomycin for cellulitis.     Interval History:  10/2 - Admitted and vitals as follows: WBC (3.2), afebrile (98.3), HR of 71, RR of 16, 144/79 mmHg. Surgery consult recommended against procedures after x-rays were negative for osteomyelitis.  10/3 - Vitals as follows: afebrile (98.1), HR of 67, RR of 16, 111/60 mmHg. Pulmonary consult were not concerned of pulmonary infection or  decompensation  10/4 - Vitals as follows: WBC (2.6), HR of 68, RR of 16, 136/69 mmHg. Continued wound care by the [facility] staff  10/5 - Vitals as follows: WBC (3.2), RR of 18, 150/85 mmHg    Assessment:    Non-healing traumatic leg wound with cellulitis. Patient remains afebrile and WBC is at baseline. CRP was mildly elevated on 10/3 (8.6) and wound continues to heal poorly at the moment. However, no concern for osteomyelitis or necrotizing infection given findings on XR. BCx 2/2 negative as well. Patient on Day 3 of zosyn and vancomycin therapy. With wound culture/swab it is difficult to discern active pathogens contributing to the cellulitis from colonizing organisms. Isolation of penicillin sensitive enterococci species and pseudomonas would be coverage by piperacillin/tazobactam. Unclear reasoning for patient s clinical worsening on Augmentin, agree with investigation into PVD.     Recommendation/Interventions:   1). Agree with Zosyn 3.375 G IV every 6 hours ending x5 days (stop date 10/7)  2). Agree with discontinuing vancomycin  3). If wound does not improve, recommend dermatology consult for a punch biopsy and obtain AFB stain and culture.       Discussed w/ ID Staff-Dr. Ingris Jaimes MD and Shelley Raines PharmD    Current Antibiotic therapy:  Zosyn 3.375 g every 6 hours intravenously 10/3   Vancomycin 1.250 g every 12 hours intravenously 10/3 -10/5    Previous Antibiotic therapy:  Augmentin Completed in September 2017    Vital Signs and other clinical features:    Temperature:      Imaging:      Culture Results:  Date Culture Site Organism    10/3 Wound - lower left leg  Enterococcus faecalis   10/3 Wound - lower left leg  Enterococcus avium   10/3 Wound - lower left leg  Pseudomonas putida

## 2017-10-05 NOTE — PROGRESS NOTES
INTERNAL MEDICINE PROGRESS NOTE    Aubrey Duncan ([id number]) admitted on 10/2/2017  10/05/2017    Assessment & Plan:  This is a 62 year old male with a history of pulmonary transplant in 2013 2/2 alpha1-anti trypsin deficiency who presents with a non-healing wound of the left lower extremity.     #Chronic non-healing left leg wound  #Left leg cellulitis  Onset in August after trauma to the left leg with piece of wood and nail causing two separate wounds in left lower leg.Initially treated at home with wound cares. Did not improve. Patient then went to PCP and was treated with about a week of augmentin. At that time per wife's pictures there was extensive eschar. Wound partially improved with augmentin, but about 4 days before admission he had wound suppuration and foul smelling. On arrival had xray without evidence of gas or bone involvement. Surgery not indicated. Believe wound not resolving because of partial coverage with augmentin. Patient initially started on vanc/zosyn. Wound culture grew E.faecalis, E. avium and Pseudomona putida. Both enterococci sensitive to penicillin. Pseudomonas sensitive to quinolones, cetaz, cefepime and  Zosyn.   - Discontinue vanc  - Continue zosyn per curbdise with ID  - Will need antibiotic for total of 14 days until 10/16/17  - PICC line order placed  - CRP mildly elevated  - WOC following, wounds look improved  - Blood cultures negative    #Hx of Lung Transplant  #Leukopenia  At this moment WBC around his baseline.   - Continue PTA tacrolimus, prednisone, azathioprine  - Continue PTA Valganciclovir, bactrim  - Pulmonary transplant consulted  - Monitor WBC     #HTN  - PTA metoprolol, lisinopril     #Chronic Diarrhea  - PTA immodium     Hx of osteoporosis:  - PTA Fosamax      FEN: regular diet  Prophylaxis: DVT mechanical - start lovenox   Disposition: Pending TCU placement  Code Status: FULL    Chad Pickens MD PhD  Internal Medicine PGY2  [phone]    Patient was seen and  "discussed with  who agrees with above assessment and plan.    ==================================================================    Interval HistorySubjective:  No overnight events. Continues to have leg pain, but not worsening.     Objective:  Most recent vital signs:  /70 (BP Location: Left arm)  Pulse 68  Temp 97.3  F (36.3  C) (Oral)  Resp 18  Ht 1.702 m (5' 7.01\")  Wt 74.1 kg (163 lb 6.4 oz)  SpO2 99%  BMI 25.59 kg/m2  Temp:  [96.5  F (35.8  C)-97.3  F (36.3  C)] 97.3  F (36.3  C)  Heart Rate:  [60-77] 77  Resp:  [16-18] 18  BP: (136-150)/(69-85) 150/70  SpO2:  [96 %-99 %] 99 %  Wt Readings from Last 2 Encounters:   10/03/17 74.1 kg (163 lb 6.4 oz)   04/19/17 74.8 kg (165 lb)     Intake/Output Summary (Last 24 hours) at 10/04/17 2008  Last data filed at 10/04/17 1859   Gross per 24 hour   Intake             1440 ml   Output                1 ml   Net             1439 ml     Physical exam:  General: Patient lying comfortably in bed, NAD   HEENT: EOMI, PERRL, no scleral icterus  Cardiac: RRR, no m/r/g appreciated.   Respiratory: CTAB  GI: NABS, NT/ND, no guarding or rebound  Extremities: wound appears improved, no suppuration, less swelling and tenderness   Skin: No acute lesions appreciated  Neuro: AOx3, moving all extremities spontaneously    Labs:  Results for orders placed or performed during the hospital encounter of 10/02/17 (from the past 24 hour(s))   CBC with platelets   Result Value Ref Range    WBC 3.2 (L) 4.0 - 11.0 10e9/L    RBC Count 3.70 (L) 4.4 - 5.9 10e12/L    Hemoglobin 12.2 (L) 13.3 - 17.7 g/dL    Hematocrit 37.8 (L) 40.0 - 53.0 %     (H) 78 - 100 fl    MCH 33.0 26.5 - 33.0 pg    MCHC 32.3 31.5 - 36.5 g/dL    RDW 13.6 10.0 - 15.0 %    Platelet Count 154 150 - 450 10e9/L           Physician Attestation  I, Vivian Stewart MD, saw this patient with the resident and agree with the resident s findings and plan of care as documented in the resident s note with my edits.     I " personally reviewed vital signs, medications, labs and imaging.    Vivian Stewart MD  Date of Service (when I saw the patient): 10/5/17

## 2017-10-05 NOTE — PLAN OF CARE
Problem: Patient Care Overview  Goal: Plan of Care/Patient Progress Review  Outcome: Improving    10/04/17 1900   OTHER   Plan Of Care Reviewed With patient;spouse   Plan of Care Review   Progress improving      A&Ox4. VSS on RA. Pt moves independently. Pt received imodium x1 for loose stools. New PIV placed this shift, running TKO in between abx. R lower leg dressing changed, scant amount of drainage. Continue with POC.

## 2017-10-05 NOTE — PROGRESS NOTES
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     #Chronic non-healing left leg wound  #Left leg cellulitis  Onset in August after trauma to the left leg with piece of wood and nail causing two separate wounds in left lower leg.Initially treated at home with wound cares. Did not improve. Patient then went to PCP and was treated with about a week of augmentin. At that time per wife's pictures there was extensive eschar. Wound partially improved with augmentin, but about 4 days before admission he had wound suppuration and foul smelling. On arrival had xray without evidence of gas or bone involvement. Surgery not indicated. Believe wound not resolving because of partial coverage with augmentin. Patient on vanc/zosyn and pending sensitivities. Wound culture grew E.faecalis and Pseudomona putida. Blood cultures negative. CRP mildly elevated. Poor wound healing potentially due to immunosuppression, need to rule out peripheral arterial disease.   - Continue van/zosyn  - Will narrow once sensitivities result  - Trend CRP periodically   - WOC following       #Hx of Lung Transplant  #Luekopenia  At this moment WBC around his baseline.   - Continue PTA tacrolimus, prednisone, azathioprine  - Continue PTA Valganciclovir, bactrim  - Pulmonary transplant consulted  - Monitor WBC     #HTN  - PTA metoprolol, lisinopril     #Chronic Diarrhea  - PTA immodium     Hx of osteoporosis:  - PTA Fosamax      FEN: regular diet  Prophylaxis: DVT mechanical  Disposition: Pending antibiotic sensitivities  Code Status: FULL    Chad Pickens MD PhD  Internal Medicine PGY1  [phone]    Patient was seen and discussed with  who agrees with above assessment and  "plan.    ==================================================================    Interval HistorySubjective:  No overnight events. Continues to have leg pain, but not worsening.     Objective:  Most recent vital signs:  /75  Pulse 68  Temp 97.1  F (36.2  C) (Oral)  Resp 16  Ht 1.702 m (5' 7.01\")  Wt 74.1 kg (163 lb 6.4 oz)  SpO2 98%  BMI 25.59 kg/m2  Temp:  [97.1  F (36.2  C)-98.1  F (36.7  C)] 97.1  F (36.2  C)  Pulse:  [68] 68  Heart Rate:  [65-69] 65  Resp:  [16-18] 16  BP: (111-152)/(60-75) 142/75  SpO2:  [95 %-98 %] 98 %  Wt Readings from Last 2 Encounters:   10/03/17 74.1 kg (163 lb 6.4 oz)   04/19/17 74.8 kg (165 lb)       Intake/Output Summary (Last 24 hours) at 10/04/17 2008  Last data filed at 10/04/17 1859   Gross per 24 hour   Intake             1440 ml   Output                1 ml   Net             1439 ml       Physical exam:  General: Patient lying comfortably in bed, NAD   HEENT: EOMI, PERRL, no scleral icterus  Cardiac: RRR, no m/r/g appreciated.   Respiratory: CTAB  GI: NABS, NT/ND, no guarding or rebound  Extremities: left left wrapped,   Skin: No acute lesions appreciated  Neuro: AOx3, moving all extremities spontaneously    Labs:  Results for orders placed or performed during the hospital encounter of 10/02/17 (from the past 24 hour(s))   Basic metabolic panel   Result Value Ref Range    Sodium 139 133 - 144 mmol/L    Potassium 4.8 3.4 - 5.3 mmol/L    Chloride 108 94 - 109 mmol/L    Carbon Dioxide 23 20 - 32 mmol/L    Anion Gap 8 3 - 14 mmol/L    Glucose 163 (H) 70 - 99 mg/dL    Urea Nitrogen 24 7 - 30 mg/dL    Creatinine 1.07 0.66 - 1.25 mg/dL    GFR Estimate 70 >60 mL/min/1.7m2    GFR Estimate If Black 84 >60 mL/min/1.7m2    Calcium 8.4 (L) 8.5 - 10.1 mg/dL   CBC with platelets   Result Value Ref Range    WBC 2.6 (L) 4.0 - 11.0 10e9/L    RBC Count 3.62 (L) 4.4 - 5.9 10e12/L    Hemoglobin 11.8 (L) 13.3 - 17.7 g/dL    Hematocrit 36.1 (L) 40.0 - 53.0 %     78 - 100 fl    MCH " 32.6 26.5 - 33.0 pg    MCHC 32.7 31.5 - 36.5 g/dL    RDW 13.5 10.0 - 15.0 %    Platelet Count 157 150 - 450 10e9/L   Lipid panel reflex to direct LDL   Result Value Ref Range    Cholesterol 183 <200 mg/dL    Triglycerides 308 (H) <150 mg/dL    HDL Cholesterol 40 >39 mg/dL    LDL Cholesterol Calculated 82 <100 mg/dL    Non HDL Cholesterol 143 (H) <130 mg/dL         Physician Attestation  I, Vivian Stewart MD, saw this patient with the resident and agree with the resident s findings and plan of care as documented in the resident s note with my edits.     I personally reviewed vital signs, medications, labs and imaging.    Vivian Stewart MD  Date of Service (when I saw the patient): 10/4/17

## 2017-10-05 NOTE — DISCHARGE INSTRUCTIONS
We have scheduled an appt for you on Thursday 10/12 at 11:00 am with your Primary Care Provider, Dr. David Jiang. Please bring your insurance card, ID, and these discharge papers with you to this appointment. *At the end of this appointment, they will set up your weekly appointments to manage your wound.*  [facility]  [address] [place] [address]  Phone #: [phone]

## 2017-10-05 NOTE — PLAN OF CARE
Problem: Patient Care Overview  Goal: Plan of Care/Patient Progress Review  Outcome: No Change  A&Ox4, VSS on RA. Up independently. Calling appropriately. Wife at bedside. Regular diet. Tolerating well. Denies nausea. Wound pain managed with scheduled dilaudid. NS infusing TKO with intermittent abx. Wound dressing change teaching done with wife and pt, verbalized understanding. Dressing change done. Pt getting PICC placed now. Home abx not covered by insurance, looking for TCU placement.  Will continue to monitor and follow POC.

## 2017-10-05 NOTE — PROGRESS NOTES
[facility] [place]    Internal Medicine Progress Note - [place] Service    Main Plans for Today       Placing PICC line for continued IV antibiotics    Care coordination facilitating TCU placement in/near [place], [place]    Assessment & Plan      Aubrey Duncan is a 62 year old male admitted on 10/2/2017. He has a history of pulmonary transplant in 2013 2/2 alpha1-antitrypsin deficiency and is admitted for a non-healing left lower extremity wound.      #Non-healing wound of left lower extremity, complicated by cellulitis: Onset in August after trauma to the left leg. Despite wound cares and a ten day course of Augmentin, the wound has not healed. Wound gram stain showed gram positive cocci and gram negative rods. X-ray showed no evidence of osteomyelitis or gas production. Has a history of a non-healing leg wound 3 years ago in the right leg, that resolved with prolonged IV antibiotics. Wound cultures at that time grew out Pseudomonas sensitive to FQ, zosyn and meropenem. Current wound cultures positive for enterococcus faecalis, enterococcus avium and pseudomonas fluorescens putida. Pan-sensitive other than peudomonas fluorescens putida being resistant to TMP-SMX. Poor wound healing potentially due to immunosuppression, need to rule out peripheral arterial disease. CRP 8.6 on 10/3.    - Continuing infusion of IV zosyn per ID curbside consultation    - Dc'd vancomycin     - Surgery consulted, appreciate recommendations    - Oral dilaudid for pain control (has been taking this prior to admission by outside prescriber)      #Hyperkalemia, resolved: Patient had an elevated potassium of 5.7 in the emergency department, resolved with a one time dose of lasix and fluid bolus. Unclear etiology, possibly due to lisinopril but he is on a small dose. He has never had an elevated potassium before per my chart review. Potassium 4.7 (10/3), 4.8 (10/4).      #Hx of Lung Transplant: Patient is doing  well from a lung transplant stand point. Denies any shortness of breath, does not require any supplemental O2. Tacrolimus level of 10.1 on 10/2.     - Continue PTA tacrolimus, prednisone, azathioprine     - Continue PTA valganciclovir, bactrim     - Pulmonary transplant consult, appreciate reccs      #HTN:      - Given pt.'s ASCVD of 15%, prescribing atorvastatin 40 mg QD     - PTA metoprolol, lisinopril      #Chronic Diarrhea     - PTA immodium      #Hx of osteoporosis:     - PTA Fosamax      FEN: Full normal diet  Lines: PIV  DVT: Compression stockings applied  Code status: FULL  Dispo: Likely discharge on 10/6 pending TCU placement.    Disposition Plan     Expected discharge: 2 - 3 days, recommended to TCU near [place] once antibiotic plan established.     Entered: Savanah Goff 10/04/2017, 1:50 PM     Information in the above section will display in the discharge planner report.       The patient's care was discussed with the Attending Physician, Dr. Vivian Stewart, Patient, Patient's Family and [place] [address] Team.     Savanah Goff, MS4  [facility]: Team 4  Pager: [phone]  Please see sticky note for cross cover information    Interval History   No acute events overnight. Slept well. Appropriate appetite. Adequate PO intake, eating full meals. Ambulating well to the bathroom. Denies subjective fevers, chills, night sweats, shortness of breath, chest pain, abdominal pain, constipation, nausea or vomiting. Endorses continued baseline diarrhea, improved with imodium. Pt reports minimal pain with movement and unwrapping bandage. Statesoral dilaudid controls his pain well.     Physical Exam   Vital Signs: Temp: 96.5  F (35.8  C) Temp src: Oral BP: 150/85   Heart Rate: 60 Resp: 18 SpO2: 98 % O2 Device: None (Room air)    Weight: 163 lbs 6.4 oz  General: Well appearing male lying in bed with lower extremity raised. Patient calm, cooperative and answering questions  appropriately.  Respiratory: Lungs clear to auscultation, no wheezes, rales or rhonchi.  Cardiovascular: RRR. No murmur, rubs or gallops.   GI: Abdomen nontender to palpation. No hepatomegaly or splenomegaly.  Skin: 3x2 in. erythematous wound present on anterior left lower extremity, 0.5x3 in. wound present on posterior left lower extremity. No edema, necrotic tissue or non-serous discharge present. Surrounding tissue mildly erythematous, warm and dry.     Data   Medications     - MEDICATION INSTRUCTIONS -         atorvastatin  40 mg Oral Daily     lisinopril (PRINIVIL/ZESTRIL) tablet 5 mg  5 mg Oral Daily     azaTHIOprine  25 mg Oral Daily     calcium-vitamin D  1 tablet Oral Daily     furosemide  20 mg Oral Daily     metoprolol  25 mg Oral BID     omeprazole  20 mg Oral BID AC     predniSONE  5 mg Oral BID w/meals     sulfamethoxazole-trimethoprim  1 tablet Oral Once per day on Mon Wed Fri     tacrolimus  3 mg Oral BID     valGANciclovir  450 mg Oral BID     piperacillin-tazobactam  3.375 g Intravenous Q6H     HYDROmorphone  2 mg Oral Q6H TABITHA     Data     Recent Labs  Lab 10/05/17  0831 10/04/17  0707 10/03/17  0057 10/02/17  2029   WBC 3.2* 2.6*  --  3.2*   HGB 12.2* 11.8*  --  13.7   * 100  --  102*    157  --  197   NA  --  139  --  137   POTASSIUM  --  4.8 4.7 5.7*   CHLORIDE  --  108  --  105   CO2  --  23  --  25   BUN  --  24  --  30   CR  --  1.07  --  1.06   ANIONGAP  --  8  --  7   ERIN  --  8.4*  --  9.2   GLC  --  163*  --  151*     No results found for this or any previous visit (from the past 24 hour(s)).

## 2017-10-06 PROCEDURE — A9270 NON-COVERED ITEM OR SERVICE: HCPCS | Mod: GY | Performed by: STUDENT IN AN ORGANIZED HEALTH CARE EDUCATION/TRAINING PROGRAM

## 2017-10-06 PROCEDURE — 25000132 ZZH RX MED GY IP 250 OP 250 PS 637: Mod: GY | Performed by: STUDENT IN AN ORGANIZED HEALTH CARE EDUCATION/TRAINING PROGRAM

## 2017-10-06 PROCEDURE — 25000131 ZZH RX MED GY IP 250 OP 636 PS 637: Mod: GY | Performed by: STUDENT IN AN ORGANIZED HEALTH CARE EDUCATION/TRAINING PROGRAM

## 2017-10-06 PROCEDURE — 99232 SBSQ HOSP IP/OBS MODERATE 35: CPT | Mod: GC | Performed by: INTERNAL MEDICINE

## 2017-10-06 PROCEDURE — 12000001 ZZH R&B MED SURG/OB UMMC

## 2017-10-06 PROCEDURE — 25000128 H RX IP 250 OP 636: Performed by: STUDENT IN AN ORGANIZED HEALTH CARE EDUCATION/TRAINING PROGRAM

## 2017-10-06 PROCEDURE — 25000125 ZZHC RX 250: Performed by: STUDENT IN AN ORGANIZED HEALTH CARE EDUCATION/TRAINING PROGRAM

## 2017-10-06 RX ORDER — PIPERACILLIN SODIUM, TAZOBACTAM SODIUM 3; .375 G/15ML; G/15ML
3.38 INJECTION, POWDER, LYOPHILIZED, FOR SOLUTION INTRAVENOUS EVERY 6 HOURS
Qty: 40 EACH
Start: 2017-10-06 | End: 2017-10-09

## 2017-10-06 RX ADMIN — LISINOPRIL 5 MG: 5 TABLET ORAL at 08:27

## 2017-10-06 RX ADMIN — TACROLIMUS 3 MG: 1 CAPSULE ORAL at 08:26

## 2017-10-06 RX ADMIN — VALGANCICLOVIR HYDROCHLORIDE 450 MG: 450 TABLET ORAL at 10:38

## 2017-10-06 RX ADMIN — SULFAMETHOXAZOLE AND TRIMETHOPRIM 1 TABLET: 400; 80 TABLET ORAL at 08:27

## 2017-10-06 RX ADMIN — TACROLIMUS 3 MG: 1 CAPSULE ORAL at 20:19

## 2017-10-06 RX ADMIN — HYDROMORPHONE HYDROCHLORIDE 2 MG: 2 TABLET ORAL at 20:19

## 2017-10-06 RX ADMIN — HYDROMORPHONE HYDROCHLORIDE 2 MG: 2 TABLET ORAL at 08:27

## 2017-10-06 RX ADMIN — FUROSEMIDE 20 MG: 20 TABLET ORAL at 08:27

## 2017-10-06 RX ADMIN — PREDNISONE 5 MG: 5 TABLET ORAL at 08:27

## 2017-10-06 RX ADMIN — HYDROMORPHONE HYDROCHLORIDE 2 MG: 2 TABLET ORAL at 01:59

## 2017-10-06 RX ADMIN — OMEPRAZOLE 20 MG: 20 CAPSULE, DELAYED RELEASE ORAL at 17:44

## 2017-10-06 RX ADMIN — PIPERACILLIN AND TAZOBACTAM 3.38 G: 3; .375 INJECTION, POWDER, LYOPHILIZED, FOR SOLUTION INTRAVENOUS; PARENTERAL at 08:26

## 2017-10-06 RX ADMIN — HYDROMORPHONE HYDROCHLORIDE 2 MG: 2 TABLET ORAL at 14:23

## 2017-10-06 RX ADMIN — OMEPRAZOLE 20 MG: 20 CAPSULE, DELAYED RELEASE ORAL at 08:27

## 2017-10-06 RX ADMIN — ATORVASTATIN CALCIUM 40 MG: 40 TABLET, FILM COATED ORAL at 08:27

## 2017-10-06 RX ADMIN — Medication 1 TABLET: at 08:27

## 2017-10-06 RX ADMIN — PREDNISONE 5 MG: 5 TABLET ORAL at 17:44

## 2017-10-06 RX ADMIN — METOPROLOL TARTRATE 25 MG: 25 TABLET, FILM COATED ORAL at 20:19

## 2017-10-06 RX ADMIN — PIPERACILLIN AND TAZOBACTAM 3.38 G: 3; .375 INJECTION, POWDER, LYOPHILIZED, FOR SOLUTION INTRAVENOUS; PARENTERAL at 20:20

## 2017-10-06 RX ADMIN — VALGANCICLOVIR HYDROCHLORIDE 450 MG: 450 TABLET ORAL at 20:19

## 2017-10-06 RX ADMIN — METOPROLOL TARTRATE 25 MG: 25 TABLET, FILM COATED ORAL at 08:29

## 2017-10-06 RX ADMIN — Medication 25 MG: at 08:27

## 2017-10-06 RX ADMIN — PIPERACILLIN AND TAZOBACTAM 3.38 G: 3; .375 INJECTION, POWDER, LYOPHILIZED, FOR SOLUTION INTRAVENOUS; PARENTERAL at 03:14

## 2017-10-06 RX ADMIN — PIPERACILLIN AND TAZOBACTAM 3.38 G: 3; .375 INJECTION, POWDER, LYOPHILIZED, FOR SOLUTION INTRAVENOUS; PARENTERAL at 14:23

## 2017-10-06 NOTE — PLAN OF CARE
Problem: Patient Care Overview  Goal: Plan of Care/Patient Progress Review  Outcome: No Change  Pt is A&O x 4, hypertensive and on room air. Pt had PICC line placed and x-ray verified. IV Zosyn infused. Oral Dilaudid scheduled for RLE pain, denies nausea. PRN Immodium given w/ dinner. PICC double lumen SL. Pt tolerating regular diet well. Up independently in room and calls appropriately. Social work looking for TCU placement since home abx not covered under insurance. Continue to monitor and POC.

## 2017-10-06 NOTE — PLAN OF CARE
Problem: Patient Care Overview  Goal: Plan of Care/Patient Progress Review  Outcome: Improving  A&Ox4, VSS on RA. Up independently. Regular diet. Leg wound pain managed with scheduled dilaudid q6h. Dressing change done. PICC infusing TKO with intermittent abx. Hot pack given to pt for PICC site pain with relief. Plan to discharge to TCU near [place] once placement found for abx infusions. Will continue to monitor and follow POC.

## 2017-10-06 NOTE — PROGRESS NOTES
Post Transplant Patient Social Work Assessment     Patient Name: Aubrey Duncan  : 1955  Age: 62 year old  MRN: [id number]  Date of transplant: 13    Patient known to me from follow up in the transplant program.  Admitted on 10/2/17 for a non-healing wound in his LLE.  Seen today to update assessment.      Presenting Information   Living Situation: with his wife in their home in [place], [place].  Functional Status: Aubrey is Independent with ADL's.  Cultural/Language/Spiritual Considerations: None.    Support System  Primary Support Person His wife, Kyung.  Other support:  His extended family.      Health Care Directive  Decision Maker: Himself  Alternate Decision Maker: His wife.  Health Care Directive: Copy in Chart    Mental Health/Coping:   History of Mental Health: None  History of Chemical Health: None  Current status: Stable  Coping: Aubrey is coping well. He seems content with the medical plan of care.  Services Needed/Recommended: None.    Financial   Income: Aubrey and his wife are retired. They both are on [employer] income.  Insurance and medication coverage: Aubrey has Medicare and a BCBS supplement.  Financial concerns: None at this time.  Resources needed: Aubrey does not have coverage for home IV Antibiotics.    Discharge Plan   Patient and family discharge goal: To get home after he finishes his IV Antibiotics. He will need to go to TCU for about a week.  Barriers to discharge: ELIZABETH is currently working with a facility in [facility] (PH:[phone], FX: [phone]) who has verbally said they can accept him and have a bed available, however they are not sure if their pharmacy can get the IV Zosyn today. Mei, the admissions coordinator, stated that they typically need 24 hours to order the medication to their pharmacy. She was going to try to see if there was anyway to get it sooner. Pt may not be able to d/c till Saturday if they cannot get the Zosyn today.    Education provided by ELIZABETH:  Writer re-educated the pt on the Social Work role in the inpatient setting, availability of support groups, parking information and the process for discharge planning for TCU placement.    Assessment and recommendations and plan:  Aubrey is looking forward to getting back home. He is understanding of the logistical situation and his insurance coverage. He and his wife are ok with the plan. His wife is staying with family in [place] Both are in agreement with the plan for TCU for the next week while he completes his course of anti-biotics.     Gypsy Mann, [facility]  [phone]  Lung Transplant SW

## 2017-10-06 NOTE — PROGRESS NOTES
Transplant Social Work Services Progress Note      Date of Initial Social Work Evaluation: 10/6/2017  Collaborated with: care coordinator     Data: eariler today, ELIZABETH had arranged for patient to be admitted to [facility]CU in [place].  Received call from  on floor that [facility] had called unit to state they had re-evaluated with info received and could no longer admit patient.  I contacted Mei in Admissions [phone].  She confirmed that this correct.  patient exceeded their care level.  They would not be able to admit tomorrow or at anytime.    Intervention: updated patient and wife.  They agreed that I should look elsewhere, anywhere closer to their home.  Contacted [facility] [phone].  They may have openings.  Referral info faxed to [phone].   Also contacted [facility] in [place].  Message left for Alice at [phone].  Faxed referral to [phone].   Contacted [facility] in [place]  [phone].  They are not currently admitting any patients due to lack of staffing.    ADD: Trena from  [facility] called back this afternoon.  They can admit.  But, it will take till Monday to get the needed IV medication.  Agreed to touch base first thing Monday morning to arrange transfer.  udpated patient and care corodinator who will update team.     Assessment: in need of TCU placement  Education provided by ELIZABETH: options and choice for TCU stay.    Plan:    Discharge Plans in Progress: to [facility] in [place] on Monday following his morning dose of zosyn    Barriers to d/c plan: facility needs time to order IV medication to be ready for thrombocytopenia     Follow up Plan: to [facility] on Monday following his morning dose of zosyn

## 2017-10-06 NOTE — PROGRESS NOTES
Transplant Social Work Service Discharge Note      Patient Name:  Aubrey Duncan     Anticipated Discharge Date:  Friday or Saturday- 10/6 or 10/7    Discharge Disposition:   TCU:  [place], [place]    Additional Services/Equipment Arranged: None.     Persons notified of above discharge plan:  Pt, his wife, the medical team and the facility.    Patient / Family response to discharge plan:  Pt is in agreement.    Education and resources provided by SW at discharge: Education about the discharge plan and general process for discharging with IV Antibiotics.    Discussed anticipated pharmacy out of pocket costs: NO    Plan: Pt will discharge to [facility]CU in [place] once discharge plan is in place. (PH: [phone], FX: [phone]). [facility] needs to get the IV Zosyn from her pharmacy before he can discharge. The script was sent to them this AM. She said it can take up to 24 hours to get the medication from their pharmacy. This may mean Aubrey cannot discharge to their care until Saturday. SW will continue to follow and assist with d/c planning. His wife will transport him. He has no other equipment needs.     PAS-RR    D: Per DHS regulation, ELIZABETH completed and submitted PAS-RR to [place] [employer] Direct Connect via the Senior LinkAge Line.  PAS-RR confirmation # is : [id number]    I: ELIZABETH spoke with Aubrey and they are aware a PAS-RR has been submitted.  ELIZABETH reviewed with Aubrey that they may be contacted for a follow up appointment within 10 days of hospital discharge if their SNF stay is < 30 days.  Contact information for [facility] LinkAge Line was also provided.    A: Aubrey verbalized understanding.    P: Further questions may be directed to [facility] LinkAge Line at #[phone], option #4 for PAS-RR staff.    Gypsy Mann, [facility]  [phone]

## 2017-10-06 NOTE — PROGRESS NOTES
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     #Chronic non-healing left leg wound  #Left leg cellulitis  Onset in August after trauma to the left leg with piece of wood and nail causing two separate wounds in left lower leg.Initially treated at home with wound cares. Did not improve. Patient then went to PCP and was treated with about a week of augmentin. At that time per wife's pictures there was extensive eschar. Wound partially improved with augmentin, but about 4 days before admission he had wound suppuration and foul smelling. On arrival had xray without evidence of gas or bone involvement. Surgery not indicated. Believe wound not resolving because of partial coverage with augmentin. Patient initially started on vanc/zosyn. Wound culture grew E.faecalis, E. avium and Pseudomona putida. Both enterococci sensitive to penicillin. Pseudomonas sensitive to quinolones, cetaz, cefepime and  Zosyn.   - Discontinue vanc  - Continue zosyn per curbdise with ID  - Will need antibiotic for total of 14 days until 10/16/17  - CRP pending  - WOC following, wounds look improved  - Blood cultures negative    #Hx of Lung Transplant  #Leukopenia  At this moment WBC around his baseline.   - Continue PTA tacrolimus, prednisone, azathioprine  - Continue PTA Valganciclovir, bactrim  - Pulmonary transplant consulted  - Monitor WBC     #HTN  - PTA metoprolol, lisinopril     #Chronic Diarrhea  - PTA immodium     Hx of osteoporosis:  - PTA Fosamax      FEN: regular diet  Prophylaxis: DVT mechanical - start lovenox   Disposition: Pending TCU placement  Code Status: FULL    Chad Pickens MD PhD  Internal Medicine PGY2  [phone]    Patient was seen and discussed with  who agrees with  "above assessment and plan.    ==================================================================    Interval HistorySubjective:  No overnight events. Continues to have leg pain, but not worsening.     Objective:  Most recent vital signs:  /85 (BP Location: Right arm)  Pulse 81  Temp 97  F (36.1  C) (Oral)  Resp 16  Ht 1.702 m (5' 7.01\")  Wt 74.1 kg (163 lb 6.4 oz)  SpO2 97%  BMI 25.59 kg/m2  Temp:  [96.1  F (35.6  C)-97.5  F (36.4  C)] 97  F (36.1  C)  Pulse:  [60-81] 81  Heart Rate:  [68] 68  Resp:  [16] 16  BP: (139-152)/(77-85) 139/85  SpO2:  [97 %-99 %] 97 %  Wt Readings from Last 2 Encounters:   10/03/17 74.1 kg (163 lb 6.4 oz)   04/19/17 74.8 kg (165 lb)     Intake/Output Summary (Last 24 hours) at 10/04/17 2008  Last data filed at 10/04/17 1859   Gross per 24 hour   Intake             1440 ml   Output                1 ml   Net             1439 ml     Physical exam:  General: Patient lying comfortably in bed, NAD   HEENT: EOMI, PERRL, no scleral icterus  Cardiac: RRR, no m/r/g appreciated.   Respiratory: CTAB  GI: NABS, NT/ND, no guarding or rebound  Extremities: wound improving, no suppuration, less swelling and tenderness in surrounding area  Skin: No acute lesions appreciated  Neuro: AOx3, moving all extremities spontaneously    Labs:  No results found for this or any previous visit (from the past 24 hour(s)).          "

## 2017-10-06 NOTE — PLAN OF CARE
Problem: Patient Care Overview  Goal: Plan of Care/Patient Progress Review  Outcome: No Change  VSS, RA. A&O. Up independently. Regular diet. No complaints of nausea, no emesis. NS infusing at TKO to PICC. IV antibiotics per MAR. Voiding wnl's. No stools reported. Endorsed discomfort to LLE; scheduled Dilaudid 2 mg PO given Q6hrs. Hot pack applied to left arm PICC overnight 2/2 soreness.

## 2017-10-06 NOTE — PROGRESS NOTES
Staff at [facility] called to report that the facility can NOT meet the patient's needs and that he will not be able to D/C to their facility tomorrow morning.   Call back number: [phone]    Attempts made to contact SW. Talked with RN CC. Bedside nurse made aware of change in plan of care and Provider to be notified.     Charge RN, Alma AVALOS

## 2017-10-07 LAB
CRP SERPL-MCNC: <2.9 MG/L (ref 0–8)
ERYTHROCYTE [DISTWIDTH] IN BLOOD BY AUTOMATED COUNT: 13.5 % (ref 10–15)
HCT VFR BLD AUTO: 37.5 % (ref 40–53)
HGB BLD-MCNC: 12 G/DL (ref 13.3–17.7)
MCH RBC QN AUTO: 32.6 PG (ref 26.5–33)
MCHC RBC AUTO-ENTMCNC: 32 G/DL (ref 31.5–36.5)
MCV RBC AUTO: 102 FL (ref 78–100)
PLATELET # BLD AUTO: 158 10E9/L (ref 150–450)
RBC # BLD AUTO: 3.68 10E12/L (ref 4.4–5.9)
WBC # BLD AUTO: 3.1 10E9/L (ref 4–11)

## 2017-10-07 PROCEDURE — 99232 SBSQ HOSP IP/OBS MODERATE 35: CPT | Mod: GC | Performed by: INTERNAL MEDICINE

## 2017-10-07 PROCEDURE — 25000132 ZZH RX MED GY IP 250 OP 250 PS 637: Mod: GY | Performed by: STUDENT IN AN ORGANIZED HEALTH CARE EDUCATION/TRAINING PROGRAM

## 2017-10-07 PROCEDURE — 36415 COLL VENOUS BLD VENIPUNCTURE: CPT | Performed by: STUDENT IN AN ORGANIZED HEALTH CARE EDUCATION/TRAINING PROGRAM

## 2017-10-07 PROCEDURE — 25000131 ZZH RX MED GY IP 250 OP 636 PS 637: Mod: GY | Performed by: STUDENT IN AN ORGANIZED HEALTH CARE EDUCATION/TRAINING PROGRAM

## 2017-10-07 PROCEDURE — 86140 C-REACTIVE PROTEIN: CPT | Performed by: STUDENT IN AN ORGANIZED HEALTH CARE EDUCATION/TRAINING PROGRAM

## 2017-10-07 PROCEDURE — 25000125 ZZHC RX 250: Performed by: STUDENT IN AN ORGANIZED HEALTH CARE EDUCATION/TRAINING PROGRAM

## 2017-10-07 PROCEDURE — 12000001 ZZH R&B MED SURG/OB UMMC

## 2017-10-07 PROCEDURE — 40000802 ZZH SITE CHECK

## 2017-10-07 PROCEDURE — A9270 NON-COVERED ITEM OR SERVICE: HCPCS | Mod: GY | Performed by: STUDENT IN AN ORGANIZED HEALTH CARE EDUCATION/TRAINING PROGRAM

## 2017-10-07 PROCEDURE — 25000128 H RX IP 250 OP 636: Performed by: STUDENT IN AN ORGANIZED HEALTH CARE EDUCATION/TRAINING PROGRAM

## 2017-10-07 PROCEDURE — 85027 COMPLETE CBC AUTOMATED: CPT | Performed by: STUDENT IN AN ORGANIZED HEALTH CARE EDUCATION/TRAINING PROGRAM

## 2017-10-07 RX ADMIN — ATORVASTATIN CALCIUM 40 MG: 40 TABLET, FILM COATED ORAL at 08:15

## 2017-10-07 RX ADMIN — PREDNISONE 5 MG: 5 TABLET ORAL at 17:56

## 2017-10-07 RX ADMIN — HYDROMORPHONE HYDROCHLORIDE 2 MG: 2 TABLET ORAL at 14:09

## 2017-10-07 RX ADMIN — HYDROMORPHONE HYDROCHLORIDE 2 MG: 2 TABLET ORAL at 20:11

## 2017-10-07 RX ADMIN — OMEPRAZOLE 20 MG: 20 CAPSULE, DELAYED RELEASE ORAL at 17:56

## 2017-10-07 RX ADMIN — TACROLIMUS 3 MG: 1 CAPSULE ORAL at 20:11

## 2017-10-07 RX ADMIN — Medication 25 MG: at 20:11

## 2017-10-07 RX ADMIN — LOPERAMIDE HYDROCHLORIDE 2 MG: 2 CAPSULE ORAL at 14:09

## 2017-10-07 RX ADMIN — OMEPRAZOLE 20 MG: 20 CAPSULE, DELAYED RELEASE ORAL at 08:15

## 2017-10-07 RX ADMIN — Medication 1 TABLET: at 08:15

## 2017-10-07 RX ADMIN — LOPERAMIDE HYDROCHLORIDE 2 MG: 2 CAPSULE ORAL at 08:25

## 2017-10-07 RX ADMIN — HYDROMORPHONE HYDROCHLORIDE 2 MG: 2 TABLET ORAL at 01:58

## 2017-10-07 RX ADMIN — PIPERACILLIN AND TAZOBACTAM 3.38 G: 3; .375 INJECTION, POWDER, LYOPHILIZED, FOR SOLUTION INTRAVENOUS; PARENTERAL at 01:59

## 2017-10-07 RX ADMIN — TACROLIMUS 3 MG: 1 CAPSULE ORAL at 08:15

## 2017-10-07 RX ADMIN — LOPERAMIDE HYDROCHLORIDE 2 MG: 2 CAPSULE ORAL at 17:56

## 2017-10-07 RX ADMIN — FUROSEMIDE 20 MG: 20 TABLET ORAL at 08:15

## 2017-10-07 RX ADMIN — PIPERACILLIN AND TAZOBACTAM 3.38 G: 3; .375 INJECTION, POWDER, LYOPHILIZED, FOR SOLUTION INTRAVENOUS; PARENTERAL at 08:18

## 2017-10-07 RX ADMIN — VALGANCICLOVIR HYDROCHLORIDE 450 MG: 450 TABLET ORAL at 23:28

## 2017-10-07 RX ADMIN — PIPERACILLIN AND TAZOBACTAM 3.38 G: 3; .375 INJECTION, POWDER, LYOPHILIZED, FOR SOLUTION INTRAVENOUS; PARENTERAL at 20:11

## 2017-10-07 RX ADMIN — VALGANCICLOVIR HYDROCHLORIDE 450 MG: 450 TABLET ORAL at 08:24

## 2017-10-07 RX ADMIN — LISINOPRIL 5 MG: 5 TABLET ORAL at 08:15

## 2017-10-07 RX ADMIN — METOPROLOL TARTRATE 25 MG: 25 TABLET, FILM COATED ORAL at 08:15

## 2017-10-07 RX ADMIN — PREDNISONE 5 MG: 5 TABLET ORAL at 08:15

## 2017-10-07 RX ADMIN — HYDROMORPHONE HYDROCHLORIDE 2 MG: 2 TABLET ORAL at 08:15

## 2017-10-07 RX ADMIN — METOPROLOL TARTRATE 25 MG: 25 TABLET, FILM COATED ORAL at 20:11

## 2017-10-07 RX ADMIN — PIPERACILLIN AND TAZOBACTAM 3.38 G: 3; .375 INJECTION, POWDER, LYOPHILIZED, FOR SOLUTION INTRAVENOUS; PARENTERAL at 14:10

## 2017-10-07 NOTE — PROGRESS NOTES
"Problem: Patient Care Overview  Goal: Plan of Care/Patient Progress Review  Outcome: Improving  Admitted with non-healing wound of the left lower extremity. Pt A&Ox4, AVSS, up ad aviva. Tolerating reg diet. Reports discomfort of  LLE wound. Pain managed with scheduled PO Dilaudid 2mg q6h. LLE wound  with dressing , no drainage noted. On IV Zosyn q6hr.No s/s of adverse reaction noted. Plan to d/c to TCU for IV antibiotics pending placement. Will continue to monitor.  /72 (BP Location: Right arm)  Pulse 68  Temp 96.4  F (35.8  C) (Oral)  Resp 16  Ht 1.702 m (5' 7.01\")  Wt 74.1 kg (163 lb 6.4 oz)  SpO2 97%  BMI 25.59 kg/m2    "

## 2017-10-07 NOTE — PLAN OF CARE
"Problem: Patient Care Overview  Goal: Plan of Care/Patient Progress Review  Outcome: Improving     /74 (BP Location: Right arm)  Pulse 70  Temp 97.2  F (36.2  C) (Oral)  Resp 16  Ht 1.702 m (5' 7.01\")  Wt 74.1 kg (163 lb 6.4 oz)  SpO2 97%  BMI 25.59 kg/m2     3520-9349: Pt A&Ox4, VSS on RA, up ad aviva. Reports constant LLE pain with wound, maintained with scheduled PO Dilaudid 2mg every 6h. Double lumen PICC patent and one lumen infusing TKO with q6h IV Zosyn as ordered. Wound care completed this AM per orders. Plan to d/c to TCU for IV antibiotics pending placement. Continue to monitor and with POC.           "

## 2017-10-07 NOTE — PROGRESS NOTES
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     #Chronic non-healing left leg wound  #Left leg cellulitis - now improving on zosyn  Onset in August after trauma to the left leg with piece of wood and nail causing two separate wounds in left lower leg.Initially treated at home with wound cares. Did not improve. Patient then went to PCP and was treated with about a week of augmentin. At that time per wife's pictures there was extensive eschar. Wound partially improved with augmentin, but about 4 days before admission he had wound suppuration and foul smelling. On arrival had xray without evidence of gas or bone involvement. Surgery not indicated. Believe wound did not completely healed because of partial antibiotic coverage. Patient initially started on vanc/zosyn. Wound culture grew E.faecalis, E. avium and Pseudomona putida. Both enterococci sensitive to penicillin. Pseudomonas sensitive to quinolones, cetaz, cefepime and  Zosyn. CRP initially elevated but now normal.  - Continue zosyn  - Will need antibiotic for total of 14 days until 10/16/17  - WOC following, wounds look improved  - Blood cultures negative to date    #Hx of Lung Transplant  #Leukopenia  At this moment WBC around his baseline.   - Continue PTA tacrolimus, prednisone, azathioprine  - Continue PTA Valganciclovir, bactrim  - Pulmonary transplant consulted  - Monitor WBC     #HTN  - PTA metoprolol, lisinopril     #Chronic Diarrhea  - PTA immodium     Hx of osteoporosis:  - PTA Fosamax    FEN: regular diet  Prophylaxis: DVT mechanical, as patient with HIT   Disposition: Pending TCU placement  Code Status: FULL    Chad Pickens MD PhD  Internal Medicine PGY2  [phone]    Patient was seen and discussed  "with  who agrees with above assessment and plan.    ==================================================================    Interval HistorySubjective:  No overnight events. Continues to have leg pain, but improved. He is ambulating around the floor without issues.    Objective:  Most recent vital signs:  /72 (BP Location: Right arm)  Pulse 68  Temp 96.4  F (35.8  C) (Oral)  Resp 16  Ht 1.702 m (5' 7.01\")  Wt 74.1 kg (163 lb 6.4 oz)  SpO2 97%  BMI 25.59 kg/m2  Temp:  [96.4  F (35.8  C)-97.7  F (36.5  C)] 96.4  F (35.8  C)  Pulse:  [67-81] 68  Resp:  [16] 16  BP: (124-139)/(71-85) 132/72  SpO2:  [96 %-97 %] 97 %  Wt Readings from Last 2 Encounters:   10/03/17 74.1 kg (163 lb 6.4 oz)   04/19/17 74.8 kg (165 lb)     Intake/Output Summary (Last 24 hours) at 10/04/17 2008  Last data filed at 10/04/17 1859   Gross per 24 hour   Intake             1440 ml   Output                1 ml   Net             1439 ml     Physical exam:  General: Patient lying comfortably in bed, NAD   HEENT: EOMI, PERRL, no scleral icterus  Cardiac: RRR, no m/r/g appreciated.   Respiratory: CTAB  GI: NABS, NT/ND, no guarding or rebound  Extremities: wound wrapped today, no evidence of active bleed or discharge  Neuro: AOx3, moving all extremities spontaneously    Labs:  Results for orders placed or performed during the hospital encounter of 10/02/17 (from the past 24 hour(s))   CBC with platelets   Result Value Ref Range    WBC 3.1 (L) 4.0 - 11.0 10e9/L    RBC Count 3.68 (L) 4.4 - 5.9 10e12/L    Hemoglobin 12.0 (L) 13.3 - 17.7 g/dL    Hematocrit 37.5 (L) 40.0 - 53.0 %     (H) 78 - 100 fl    MCH 32.6 26.5 - 33.0 pg    MCHC 32.0 31.5 - 36.5 g/dL    RDW 13.5 10.0 - 15.0 %    Platelet Count 158 150 - 450 10e9/L   CRP inflammation   Result Value Ref Range    CRP Inflammation <2.9 0.0 - 8.0 mg/L       Physician Attestation  I, Vivian Stewart MD, saw this patient with the resident and agree with the resident s findings and plan of " care as documented in the resident s note with my edits.     I personally reviewed vital signs, medications, labs and imaging.    Vivian Stewart MD  Date of Service (when I saw the patient): 10/7/17

## 2017-10-07 NOTE — PLAN OF CARE
"Problem: Patient Care Overview  Goal: Plan of Care/Patient Progress Review  Outcome: Improving     /71  Pulse 67  Temp 97.7  F (36.5  C) (Oral)  Resp 16  Ht 1.702 m (5' 7.01\")  Wt 74.1 kg (163 lb 6.4 oz)  SpO2 96%  BMI 25.59 kg/m2     3147-7616: Pt A&Ox4, VSS on RA, up ad aviva. Reports constant LLE pain with wound, maintained with scheduled PO Dilaudid 2mg every 6h. Double lumen PICC patent and one lumen infusing TKO with q6h IV Zosyn as ordered. Pt took shower this evening. Plan to d/c to TCU for IV antibiotics pending placement. Continue to monitor and with POC.       "

## 2017-10-08 PROCEDURE — 12000001 ZZH R&B MED SURG/OB UMMC

## 2017-10-08 PROCEDURE — 25000128 H RX IP 250 OP 636: Performed by: STUDENT IN AN ORGANIZED HEALTH CARE EDUCATION/TRAINING PROGRAM

## 2017-10-08 PROCEDURE — 25000131 ZZH RX MED GY IP 250 OP 636 PS 637: Mod: GY | Performed by: STUDENT IN AN ORGANIZED HEALTH CARE EDUCATION/TRAINING PROGRAM

## 2017-10-08 PROCEDURE — A9270 NON-COVERED ITEM OR SERVICE: HCPCS | Mod: GY | Performed by: INTERNAL MEDICINE

## 2017-10-08 PROCEDURE — A9270 NON-COVERED ITEM OR SERVICE: HCPCS | Mod: GY | Performed by: STUDENT IN AN ORGANIZED HEALTH CARE EDUCATION/TRAINING PROGRAM

## 2017-10-08 PROCEDURE — 25000125 ZZHC RX 250: Performed by: STUDENT IN AN ORGANIZED HEALTH CARE EDUCATION/TRAINING PROGRAM

## 2017-10-08 PROCEDURE — 25000132 ZZH RX MED GY IP 250 OP 250 PS 637: Mod: GY | Performed by: STUDENT IN AN ORGANIZED HEALTH CARE EDUCATION/TRAINING PROGRAM

## 2017-10-08 PROCEDURE — 99231 SBSQ HOSP IP/OBS SF/LOW 25: CPT | Mod: GC | Performed by: INTERNAL MEDICINE

## 2017-10-08 PROCEDURE — 25000132 ZZH RX MED GY IP 250 OP 250 PS 637: Mod: GY | Performed by: INTERNAL MEDICINE

## 2017-10-08 RX ORDER — LOPERAMIDE HCL 2 MG
2 CAPSULE ORAL 3 TIMES DAILY
Status: DISCONTINUED | OUTPATIENT
Start: 2017-10-08 | End: 2017-10-09 | Stop reason: HOSPADM

## 2017-10-08 RX ADMIN — METOPROLOL TARTRATE 25 MG: 25 TABLET, FILM COATED ORAL at 20:50

## 2017-10-08 RX ADMIN — VALGANCICLOVIR HYDROCHLORIDE 450 MG: 450 TABLET ORAL at 08:16

## 2017-10-08 RX ADMIN — METOPROLOL TARTRATE 25 MG: 25 TABLET, FILM COATED ORAL at 08:16

## 2017-10-08 RX ADMIN — LOPERAMIDE HYDROCHLORIDE 2 MG: 2 CAPSULE ORAL at 08:17

## 2017-10-08 RX ADMIN — PREDNISONE 5 MG: 5 TABLET ORAL at 17:49

## 2017-10-08 RX ADMIN — LOPERAMIDE HYDROCHLORIDE 2 MG: 2 CAPSULE ORAL at 20:50

## 2017-10-08 RX ADMIN — ATORVASTATIN CALCIUM 40 MG: 40 TABLET, FILM COATED ORAL at 08:17

## 2017-10-08 RX ADMIN — FUROSEMIDE 20 MG: 20 TABLET ORAL at 08:17

## 2017-10-08 RX ADMIN — PIPERACILLIN AND TAZOBACTAM 3.38 G: 3; .375 INJECTION, POWDER, LYOPHILIZED, FOR SOLUTION INTRAVENOUS; PARENTERAL at 02:09

## 2017-10-08 RX ADMIN — PIPERACILLIN AND TAZOBACTAM 3.38 G: 3; .375 INJECTION, POWDER, LYOPHILIZED, FOR SOLUTION INTRAVENOUS; PARENTERAL at 20:51

## 2017-10-08 RX ADMIN — HYDROMORPHONE HYDROCHLORIDE 2 MG: 2 TABLET ORAL at 14:44

## 2017-10-08 RX ADMIN — LISINOPRIL 5 MG: 5 TABLET ORAL at 08:17

## 2017-10-08 RX ADMIN — Medication 1 TABLET: at 08:17

## 2017-10-08 RX ADMIN — OMEPRAZOLE 20 MG: 20 CAPSULE, DELAYED RELEASE ORAL at 08:17

## 2017-10-08 RX ADMIN — Medication 25 MG: at 20:50

## 2017-10-08 RX ADMIN — TACROLIMUS 3 MG: 1 CAPSULE ORAL at 17:50

## 2017-10-08 RX ADMIN — LOPERAMIDE HYDROCHLORIDE 2 MG: 2 CAPSULE ORAL at 14:44

## 2017-10-08 RX ADMIN — TACROLIMUS 3 MG: 1 CAPSULE ORAL at 08:16

## 2017-10-08 RX ADMIN — HYDROMORPHONE HYDROCHLORIDE 2 MG: 2 TABLET ORAL at 02:08

## 2017-10-08 RX ADMIN — HYDROMORPHONE HYDROCHLORIDE 2 MG: 2 TABLET ORAL at 20:50

## 2017-10-08 RX ADMIN — VALGANCICLOVIR HYDROCHLORIDE 450 MG: 450 TABLET ORAL at 20:50

## 2017-10-08 RX ADMIN — HYDROMORPHONE HYDROCHLORIDE 2 MG: 2 TABLET ORAL at 08:17

## 2017-10-08 RX ADMIN — PREDNISONE 5 MG: 5 TABLET ORAL at 08:17

## 2017-10-08 RX ADMIN — OMEPRAZOLE 20 MG: 20 CAPSULE, DELAYED RELEASE ORAL at 17:49

## 2017-10-08 RX ADMIN — PIPERACILLIN AND TAZOBACTAM 3.38 G: 3; .375 INJECTION, POWDER, LYOPHILIZED, FOR SOLUTION INTRAVENOUS; PARENTERAL at 08:16

## 2017-10-08 RX ADMIN — PIPERACILLIN AND TAZOBACTAM 3.38 G: 3; .375 INJECTION, POWDER, LYOPHILIZED, FOR SOLUTION INTRAVENOUS; PARENTERAL at 14:44

## 2017-10-08 NOTE — PROGRESS NOTES
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     #Chronic non-healing left leg wound  #Left leg cellulitis - now improving on zosyn  Onset in August after trauma to the left leg with piece of wood and nail causing two separate wounds in left lower leg.Initially treated at home with wound cares. Did not improve. Patient then went to PCP and was treated with about a week of augmentin. At that time per wife's pictures there was extensive eschar. Wound partially improved with augmentin, but about 4 days before admission he had wound suppuration and foul smelling. On arrival had xray without evidence of gas or bone involvement. Surgery not indicated. Believe wound did not completely healed because of partial antibiotic coverage. Patient initially started on vanc/zosyn. Wound culture grew E.faecalis, E. avium and Pseudomona putida. Both enterococci sensitive to penicillin. Pseudomonas sensitive to quinolones, cetaz, cefepime and  Zosyn. CRP initially elevated but now normal.  - Continue zosyn  - Will need antibiotic for total of 14 days until 10/16/17  - WOC following, wounds look improved  - Blood cultures negative to date    #Hx of Lung Transplant  #Leukopenia  At this moment WBC around his baseline.   - Continue PTA tacrolimus, prednisone, azathioprine  - Continue PTA Valganciclovir, bactrim  - Pulmonary transplant consulted  - Monitor WBC     #HTN  - PTA metoprolol, lisinopril     #Chronic Diarrhea  - PTA immodium     Hx of osteoporosis:  - PTA Fosamax    FEN: regular diet  Prophylaxis: DVT mechanical, as patient with HIT   Disposition: Pending TCU placement  Code Status: FULL    Esra  "[place]      ==================================================================    Interval HistorySubjective:  No overnight events. Denies any new complaints. No pain, fevers, chills.     Objective:  Most recent vital signs:  /74 (BP Location: Right arm)  Pulse 64  Temp 96.7  F (35.9  C) (Oral)  Resp 16  Ht 1.702 m (5' 7.01\")  Wt 74.1 kg (163 lb 6.4 oz)  SpO2 98%  BMI 25.59 kg/m2  Temp:  [96.7  F (35.9  C)-97.7  F (36.5  C)] 96.7  F (35.9  C)  Pulse:  [64-70] 64  Heart Rate:  [73] 73  Resp:  [16] 16  BP: (123-139)/(74-75) 138/74  SpO2:  [96 %-98 %] 98 %  Wt Readings from Last 2 Encounters:   10/03/17 74.1 kg (163 lb 6.4 oz)   04/19/17 74.8 kg (165 lb)     Intake/Output Summary (Last 24 hours) at 10/04/17 2008  Last data filed at 10/04/17 1859   Gross per 24 hour   Intake             1440 ml   Output                1 ml   Net             1439 ml     Physical exam:  General: Patient lying comfortably in bed, NAD   GI: NABS, NT/ND, no guarding or rebound  Extremities: wound wrapped today, no evidence of active bleed or discharge  Neuro: AOx3, moving all extremities spontaneously    Labs:  No results found for this or any previous visit (from the past 24 hour(s)).                  "

## 2017-10-08 NOTE — PLAN OF CARE
"Problem: Patient Care Overview  Goal: Plan of Care/Patient Progress Review  Outcome: No Change     BP (P) 133/71 (BP Location: Right arm)  Pulse (P) 68  Temp (P) 97.9  F (36.6  C) (Oral)  Resp (P) 16  Ht 1.702 m (5' 7.01\")  Wt 74.1 kg (163 lb 6.4 oz)  SpO2 (P) 97%  BMI 25.59 kg/m2     5172-2193: Pt A&Ox4, VSS on RA, up ad aviva. Reports constant LLE pain with wound, maintained with scheduled PO Dilaudid 2mg every 6h. Double lumen PICC patent and one lumen infusing TKO with q6h IV Zosyn as ordered. Modified wound care completed per orders, hospital stock of poly-mem for dressing is out, only able to change on larger wound with pt preference to change after shower this evening. Plan to d/c to TCU tomorrow near [place] for IV antibiotics pending TCU ability to obtain necessary medications. Continue to monitor and with POC.       "

## 2017-10-08 NOTE — PLAN OF CARE
Problem: Patient Care Overview  Goal: Plan of Care/Patient Progress Review  Outcome: No Change  7255-0905: A&Ox4, VSS on RA. Up independently. Pt up walking in hallway and stairs during evening. LLE wound dressing CDI. Pt on scheduled dilaudid PO 2mg q6h for wound pain management. Double lumen PICC patent, one lumen infusing TKO with q6h Zosyn. Plan to d/c to TCU with IV antibiotics, pending placement. Will continue to monitor and follow POC.

## 2017-10-09 ENCOUNTER — TELEPHONE (OUTPATIENT)
Dept: PULMONOLOGY | Facility: CLINIC | Age: 62
End: 2017-10-09

## 2017-10-09 ENCOUNTER — CARE COORDINATION (OUTPATIENT)
Dept: CARE COORDINATION | Facility: CLINIC | Age: 62
End: 2017-10-09

## 2017-10-09 ENCOUNTER — AMBULATORY - GICH (OUTPATIENT)
Dept: SCHEDULING | Facility: OTHER | Age: 62
End: 2017-10-09

## 2017-10-09 VITALS
HEIGHT: 67 IN | RESPIRATION RATE: 16 BRPM | SYSTOLIC BLOOD PRESSURE: 168 MMHG | WEIGHT: 163.4 LBS | TEMPERATURE: 96.4 F | HEART RATE: 62 BPM | DIASTOLIC BLOOD PRESSURE: 87 MMHG | BODY MASS INDEX: 25.65 KG/M2 | OXYGEN SATURATION: 97 %

## 2017-10-09 LAB
BACTERIA SPEC CULT: NO GROWTH
BACTERIA SPEC CULT: NO GROWTH
CREAT SERPL-MCNC: 0.96 MG/DL (ref 0.66–1.25)
GFR SERPL CREATININE-BSD FRML MDRD: 79 ML/MIN/1.7M2
SPECIMEN SOURCE: NORMAL
SPECIMEN SOURCE: NORMAL
TACROLIMUS BLD-MCNC: 8.9 UG/L (ref 5–15)
TME LAST DOSE: NORMAL H

## 2017-10-09 PROCEDURE — A9270 NON-COVERED ITEM OR SERVICE: HCPCS | Mod: GY | Performed by: STUDENT IN AN ORGANIZED HEALTH CARE EDUCATION/TRAINING PROGRAM

## 2017-10-09 PROCEDURE — 36415 COLL VENOUS BLD VENIPUNCTURE: CPT | Performed by: INTERNAL MEDICINE

## 2017-10-09 PROCEDURE — 25000128 H RX IP 250 OP 636: Performed by: STUDENT IN AN ORGANIZED HEALTH CARE EDUCATION/TRAINING PROGRAM

## 2017-10-09 PROCEDURE — 25000131 ZZH RX MED GY IP 250 OP 636 PS 637: Mod: GY | Performed by: STUDENT IN AN ORGANIZED HEALTH CARE EDUCATION/TRAINING PROGRAM

## 2017-10-09 PROCEDURE — 25000125 ZZHC RX 250: Performed by: STUDENT IN AN ORGANIZED HEALTH CARE EDUCATION/TRAINING PROGRAM

## 2017-10-09 PROCEDURE — A9270 NON-COVERED ITEM OR SERVICE: HCPCS | Mod: GY | Performed by: INTERNAL MEDICINE

## 2017-10-09 PROCEDURE — 25000132 ZZH RX MED GY IP 250 OP 250 PS 637: Mod: GY | Performed by: STUDENT IN AN ORGANIZED HEALTH CARE EDUCATION/TRAINING PROGRAM

## 2017-10-09 PROCEDURE — 25000132 ZZH RX MED GY IP 250 OP 250 PS 637: Mod: GY | Performed by: INTERNAL MEDICINE

## 2017-10-09 PROCEDURE — 80197 ASSAY OF TACROLIMUS: CPT | Performed by: INTERNAL MEDICINE

## 2017-10-09 PROCEDURE — 82565 ASSAY OF CREATININE: CPT | Performed by: INTERNAL MEDICINE

## 2017-10-09 PROCEDURE — 99238 HOSP IP/OBS DSCHRG MGMT 30/<: CPT | Mod: GC | Performed by: INTERNAL MEDICINE

## 2017-10-09 RX ORDER — PIPERACILLIN SODIUM, TAZOBACTAM SODIUM 3; .375 G/15ML; G/15ML
3.38 INJECTION, POWDER, LYOPHILIZED, FOR SOLUTION INTRAVENOUS EVERY 6 HOURS
Qty: 40 EACH | Status: ON HOLD | DISCHARGE
Start: 2017-10-09 | End: 2017-10-18

## 2017-10-09 RX ORDER — HYDROMORPHONE HYDROCHLORIDE 2 MG/1
2 TABLET ORAL EVERY 6 HOURS PRN
Qty: 20 TABLET | Refills: 0 | Status: ON HOLD | OUTPATIENT
Start: 2017-10-09 | End: 2017-10-18

## 2017-10-09 RX ADMIN — FUROSEMIDE 20 MG: 20 TABLET ORAL at 08:00

## 2017-10-09 RX ADMIN — METOPROLOL TARTRATE 25 MG: 25 TABLET, FILM COATED ORAL at 08:00

## 2017-10-09 RX ADMIN — SULFAMETHOXAZOLE AND TRIMETHOPRIM 1 TABLET: 400; 80 TABLET ORAL at 08:00

## 2017-10-09 RX ADMIN — HYDROMORPHONE HYDROCHLORIDE 2 MG: 2 TABLET ORAL at 02:03

## 2017-10-09 RX ADMIN — LOPERAMIDE HYDROCHLORIDE 2 MG: 2 CAPSULE ORAL at 08:00

## 2017-10-09 RX ADMIN — PREDNISONE 5 MG: 5 TABLET ORAL at 08:00

## 2017-10-09 RX ADMIN — Medication 1 TABLET: at 08:00

## 2017-10-09 RX ADMIN — HYDROMORPHONE HYDROCHLORIDE 2 MG: 2 TABLET ORAL at 08:00

## 2017-10-09 RX ADMIN — VALGANCICLOVIR HYDROCHLORIDE 450 MG: 450 TABLET ORAL at 09:38

## 2017-10-09 RX ADMIN — OMEPRAZOLE 20 MG: 20 CAPSULE, DELAYED RELEASE ORAL at 08:00

## 2017-10-09 RX ADMIN — TACROLIMUS 3 MG: 1 CAPSULE ORAL at 08:00

## 2017-10-09 RX ADMIN — ATORVASTATIN CALCIUM 40 MG: 40 TABLET, FILM COATED ORAL at 08:00

## 2017-10-09 RX ADMIN — LISINOPRIL 5 MG: 5 TABLET ORAL at 08:00

## 2017-10-09 RX ADMIN — PIPERACILLIN AND TAZOBACTAM 3.38 G: 3; .375 INJECTION, POWDER, LYOPHILIZED, FOR SOLUTION INTRAVENOUS; PARENTERAL at 02:04

## 2017-10-09 RX ADMIN — PIPERACILLIN AND TAZOBACTAM 3.38 G: 3; .375 INJECTION, POWDER, LYOPHILIZED, FOR SOLUTION INTRAVENOUS; PARENTERAL at 08:03

## 2017-10-09 ASSESSMENT — PAIN DESCRIPTION - DESCRIPTORS: DESCRIPTORS: ACHING

## 2017-10-09 NOTE — PLAN OF CARE
Problem: Patient Care Overview  Goal: Plan of Care/Patient Progress Review  Outcome: No Change  6378-0873: VSS on RA, A&Ox4, up ad aviva. Constant mild pain on LLE with  wound. Pain managed with Dilaudid 2 mg q6hrs. Double lumen PICC patent one lumen infusing with Zosyn per order. Wound care per plan of care after shower this evening. Plan to discharge to TCU tomorrow. Continue monitor and follow POC.

## 2017-10-09 NOTE — TELEPHONE ENCOUNTER
Tacrolimus level 8.9  At 13.5 hours with goal range of 10-12. No dose adjustment needed at this time and will recheck again in 2 weeks at clinic visit at  patients request

## 2017-10-09 NOTE — PROGRESS NOTES
Patient Name:  Aubrey Duncan     Anticipated Discharge Date:  10/09/17     Discharge Disposition:   TCU:  [place], [place]     Additional Services/Equipment Arranged: None.     Persons notified of above discharge plan:  Pt, his wife, the medical team and the facility.     Patient / Family response to discharge plan:  Pt is in agreement.     Education and resources provided by ELIZABETH at discharge: Education about the discharge plan and general process for discharging with IV Antibiotics.     Discussed anticipated pharmacy out of pocket costs: NO     Plan: Pt will discharge to [facility] today(PH: [phone], FX: [phone]).  His wife will transport him. He has no other equipment needs.      PAS-RR     D: Per DHS regulation, ELIZABETH completed and submitted PAS-RR to [place] [employer] Direct Connect via the Senior LinkAge Line.  PAS-RR confirmation # is : [id number]     I: ELIZABETH spoke with Aubrey and they are aware a PAS-RR has been submitted.  ELIZABETH reviewed with Aubrey that they may be contacted for a follow up appointment within 10 days of hospital discharge if their SNF stay is < 30 days.  Contact information for [facility] LinkAge Line was also provided.     A: Aubrey verbalized understanding.     P: Further questions may be directed to [facility] LinkAge Line at #[phone], option #4 for PAS-RR staff.     Gypsy Mann, [facility]  [phone]

## 2017-10-09 NOTE — PLAN OF CARE
Problem: Patient Care Overview  Goal: Plan of Care/Patient Progress Review  Outcome: Improving  [id number]  Pt A&O. VSS on RA. Pt up independently, calling appropriately. IV zosyn given x1 through LPICC, TKO between abx. No complaints of pain/nausea overnight. Dressing LLE c/d/i. Voiding, no BM this shift. Wife at bedside. Plan to d/c to TCU near [place] today for IV abx.

## 2017-10-09 NOTE — PROGRESS NOTES
Pt has order to d/c today. Pt is stable and agreeable. Wife at bedside, will provide transport. PICC to remain in place for continued IV abx, next dose due this afternoon. D/c packet printed and given to pt and wife to bring to facility.
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
## 2017-10-09 NOTE — PROGRESS NOTES
Transplant Social Work Services Progress Note      Date of Initial Social Work Evaluation: 10/6/2017   Collaborated with: medicine team    Data: [facility] in [place] can admit today.  They need signed orders ASAP so they can have zosyn ready on patient arrival.  Written and faxed. patient spouse here and will transport patient.  patient receiving his dose of IV antibiotic now.  Can d/c as soon as seen as d/c by team.  Should plan to be at receiving facility by 2 PM for afternoon dose.    Intervention: finalized plan for d/c   Assessment: medically ready for d/c   Education provided by SW: discharge process  Plan:    Discharge Plans in Progress: to [facility] today.       Barriers to d/c plan: none    Follow up Plan: patient to SNF today.

## 2017-10-10 ENCOUNTER — AMBULATORY - GICH (OUTPATIENT)
Dept: SCHEDULING | Facility: OTHER | Age: 62
End: 2017-10-10

## 2017-10-10 ENCOUNTER — TELEPHONE (OUTPATIENT)
Dept: PHARMACY | Facility: OTHER | Age: 62
End: 2017-10-10

## 2017-10-10 DIAGNOSIS — M85.80 OSTEOPENIA: ICD-10-CM

## 2017-10-10 RX ORDER — ALENDRONATE SODIUM 70 MG/1
70 TABLET ORAL
Qty: 4 TABLET | Refills: 11 | Status: SHIPPED | OUTPATIENT
Start: 2017-10-10 | End: 2018-02-23

## 2017-10-10 NOTE — DISCHARGE SUMMARY
Discharge Summary     Aubrey Duncan MRN# [id number]   YOB: 1955 Age: 62 year old      Primary Care Physician:   David Jiang  Admitting Attending Physician:            Joon Duncan MD  Discharge Attending Physician:             Vivian Stewart  Discharge Resident:   Chad Pickens, PGY1  Discharging Service:   Internal Medicine      Date of Admission: 10/2/2017  Date of Discharge:        10/9/17      Discharge Diagnoses:  Chronic non-healing left wound  Left leg cellulitis  Hx of lung transplant  Leukopenia  HTN  Chronic diarrhea  Hx of osteoporosis    FOLLOW UP PLAN    1. The patient needs to follow up with his PCP in 1 weeks:  - Following lab tests are recommended: CBC  - Following imaging studies are needed: none  - Following labs are pending at discharge: none    2. The patient needs to follow up with Pulmonology, 1 and 2 weeks:  - Following lab tests are recommended: cbc, other labs per pulmonology transplant team  - Following imaging studies are needed: none  - Following labs are pending at discharge: none    Labs/Procedures/imaging with results:    CMP  Recent Labs  Lab 10/09/17  1018 10/04/17  0707   NA  --  139   POTASSIUM  --  4.8   BUN  --  24   CR 0.96 1.07     CBC  Recent Labs  Lab 10/07/17  0724 10/05/17  0831 10/04/17  0707   WBC 3.1* 3.2* 2.6*   HGB 12.0* 12.2* 11.8*    154 157     INRNo lab results found in last 7 days.  No results found for: TROPI, TROPONIN, TROPR, TROPN  Significant Results and Procedures   Results for orders placed or performed during the hospital encounter of 10/02/17   Tib/Fib XR, left    Narrative    2 views left tibia/fibula radiographs 10/3/2017 2:08 AM    History: wound, eval for osteo    Comparison: None available.    Findings:    AP and lateral views of the left tibia/fibula were obtained.     No acute osseous abnormality. Knee and ankle joints are incompletely  assessed but grossly congruent. Degenerative  changes of the knee.      Extensive vascular calcifications. No subcutaneous emphysema.      Impression    Impression:  1. No subcutaneous emphysema or radiographic evidence of  osteomyelitis.  2. No acute osseous abnormality.    I have personally reviewed the examination and initial interpretation  and I agree with the findings.    RENALDO ANDERSON MD   US TC Doppler No Exercise    Narrative    Exam: Bilateral lower extremity resting ankle brachial indices dated  10/3/2017 9:16 AM    Comparison study: 10/5/2015    Clinical history: Nonhealing wound on the left lower calf/above the  ankle.    Technique: Bilateral lower extremity resting ankle brachial indices  obtained.    Findings:    Right:      Arm: 155 mmHg   PT at ankle: Noncompressible   DP at foot: Noncompressible   First digit: 133 mmHg   TC: Cannot be evaluated due to noncompressible vessels.   TBI: 0.79, previously 0.87    Right pulse volume recordings or VPR:       First digit: Normal amplitude    Left:     Arm: 165 mmHg   PT at ankle: Noncompressible   DP at foot: Noncompressed  First digit: 95 mmHg    TC: Cannot be evaluated due to noncompressible vessels.   TBI: 0.58, previously 0.77    Left pulse volume recordings or VPR:    First digit: Normal amplitude.      Impression    Impression:     Right leg: Resting TC cannot be evaluated due to noncompressible  vessels in the leg. TBI measures 0.79, normal, although decreased  compared to prior exam.    Left leg: Resting TC is cannot be evaluated due to noncompressible  vessels in the leg. TBI measures 0.58 which is diminished, previously  0.77.    Guidelines:    TC Diagnostic Criteria (Based on criteria published in Circulation  2011; 124: 6229-7820):    > 1.4: Non compressible    1.00 - 1.40: Normal    0.91 - 0.99: Borderline    At or below 0.90: Abnormal    TC Diagnostic Criteria (Based on ACC/AHA guideline 2008):    >/=1.3 - non compressible vessels    1.00  -1.29 - Normal    0.91 - 0.99 -  Borderline    0.41 - 0.90 - Mild to moderate PAD    0.00 - 0.40 - Severe PAD    I have personally reviewed the examination and initial interpretation  and I agree with the findings.    RADHA BANKS MD   IR PICC Vascular    Narrative    Findings/    Impression    Impression:   Fluoroscopy was provided to the vascular access nursing service for  documentation of the tip position of the PICC. The tip projects over  the superior atriocaval junction.     CHAYO CANTRELL MD       Consultations obtained:   Consultations This Hospital Stay   PHARMACY TO DOSE [facility]  SURGERY GENERAL ADULT IP CONSULT  WOUND OSTOMY CONTINENCE NURSE  IP CONSULT  PHARMACY TO DOSE [facility]  PULMONARY GENERAL ADULT IP CONSULT  MEDICATION HISTORY IP PHARMACY CONSULT  VASCULAR ACCESS CARE ADULT IP CONSULT  VASCULAR ACCESS CARE ADULT IP CONSULT  VASCULAR ACCESS ADULT IP CONSULT  PATIENT LEARNING CENTER IP CONSULT    Brief HPI:  Adapted from H&P on 10/2/2017.  Please refer to it for further details. 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    Hospital Course:   #Chronic non-healing left leg wound  #Left leg cellulitis - now improving on zosyn  Onset in August after trauma to the left leg with piece of wood and nail causing two separate wounds in left lower leg.Initially treated at home with wound cares. Did not improve. Patient then went to PCP and was treated with about a week of augmentin. At that time per wife's pictures there was extensive eschar. Wound partially improved with augmentin, but about 4 days before admission he had wound suppuration and foul smelling. On arrival had xray without evidence of gas or bone involvement. Surgery not indicated. Believe wound did not completely healed because of partial antibiotic coverage. Patient initially started on vanc/zosyn. Wound culture grew E.faecalis, E. avium and  Pseudomona putida. Both enterococci sensitive to penicillin. Pseudomonas sensitive to quinolones, cetaz, cefepime and  Zosyn. CRP initially elevated but now normal. Patient discharged with Zosyn due to quinolone allergy. To complete antibiotic therapy until 10/16/17. Will continue to need daily wound cleaning and dressings.      #Hx of Lung Transplant  #Leukopenia  At this moment WBC around his baseline. He is to continue immunosuppresant therapy with tacrolimus, prednisone, azathioprine as well as prophylaxis with valganciclovir and bactrim. Tacro levels inpatient were normal. He will continue to follow with outpatient pulmonary transplant team.       #HTN  - PTA metoprolol, lisinopril      #Chronic Diarrhea  - PTA immodium      Hx of osteoporosis:  - PTA Fosamax    Discharge disposition and destination:  TCU for antibiotic coverage    Discharge Physical Exam:     Vitals:    10/03/17 0230 10/03/17 0416   Weight: 74.8 kg (164 lb 14.5 oz) 74.1 kg (163 lb 6.4 oz)       General: Patient lying comfortably in bed, NAD   HEENT: EOMI, PERRL, no scleral icterus  Cardiac: RRR, no m/r/g appreciated.   Respiratory: CTAB  GI: NABS, NT/ND, no guarding or rebound  Extremities: wound improving, no suppuration, less swelling and tenderness in surrounding area  Skin: No acute lesions appreciated  Neuro: AOx3, moving all extremities spontaneously    Discharge Medications:  Discharge Medication List as of 10/9/2017 10:45 AM      START taking these medications    Details   HYDROmorphone (DILAUDID) 2 MG tablet Take 1 tablet (2 mg) by mouth every 6 hours as needed for moderate to severe pain, Disp-20 tablet, R-0, Local Print      order for DME Equipment being ordered: Polymem Non-Adhesive Pad  Treatment Diagnosis: Wound infectionDisp-30 pad, R-1, Local Print         CONTINUE these medications which have CHANGED    Details   piperacillin-tazobactam (ZOSYN) 3-0.375 GM vial Inject 3.375 g into the vein every 6 hours for 7 days, Disp-40  each, Transitional         CONTINUE these medications which have NOT CHANGED    Details   LISINOPRIL PO Take 5 mg by mouth daily, Historical      sulfamethoxazole-trimethoprim (BACTRIM/SEPTRA) 400-80 MG per tablet TAKE ONE TABLET BY MOUTH THREE TIMES A WEEK, Disp-12 tablet, R-11, E-Prescribe      predniSONE (DELTASONE) 5 MG tablet TAKE ONE TABLET BY MOUTH EVERY MORNING AND TAKE ONE-HALF TABLET BY MOUTH EVERY EVENING, Disp-45 tablet, R-12, AMANUEL, E-Prescribe      azaTHIOprine (IMURAN) 50 MG tablet Take 0.5 tablets (25 mg) by mouth daily, Disp-15 tablet, R-11, Fax      tacrolimus (PROGRAF - GENERIC EQUIVALENT) 1 MG capsule Take 3 caps every am and 3 mg every pm., Disp-180 capsule, R-12, E-PrescribeDosing change.      metoprolol (LOPRESSOR) 25 MG tablet TAKE ONE TABLET BY MOUTH TWICE A DAY, Disp-60 tablet, R-12, Fax      alendronate (FOSAMAX) 70 MG tablet Take 1 tablet (70 mg) by mouth every 7 days Take with 8 ounces water, at least 60 min before breakfast, and stay upright for at least 30 min after dose., Disp-4 tablet, R-12, Fax      furosemide (LASIX) 20 MG tablet TAKE ONE TABLET BY MOUTH EVERY DAY, Disp-30 tablet, R-11, E-Prescribe      albuterol (PROAIR HFA, PROVENTIL HFA, VENTOLIN HFA) 108 (90 BASE) MCG/ACT inhaler Inhale 2 puffs into the lungs every 6 hours as needed for shortness of breath / dyspnea or wheezing, Disp-3 Inhaler, R-11, E-Prescribe      valGANciclovir (VALCYTE) 450 MG tablet TAKE ONE TABLETS (450MG) BY MOUTH TWO TIMES A DAY, Disp-60 tablet, R-11, E-Prescribe      loperamide (IMODIUM) 2 MG capsule Take 1 capsule (2 mg) by mouth 4 times daily as needed for diarrhea, Disp-120 capsule, R-12, E-Prescribe      omeprazole (PRILOSEC) 20 MG capsule Take 1 capsule (20 mg) by mouth 2 times daily (before meals), Disp-60 capsule, R-12, Fax      calcium-vitamin D (CALTRATE) 600-400 MG-UNIT per tablet Take 1 tablet by mouth 2 times daily (with meals), Disp-60 tablet, R-12, E-Prescribe      multivitamin,  therapeutic (THERA-VIT) TABS Take 1 tablet by mouth daily, Disp-30 tablet, R-12, E-Prescribe      sildenafil (VIAGRA) 25 MG tablet Take 1 tablet (25 mg) by mouth as needed for erectile dysfunction, Disp-30 tablet, R-0, Local Print         STOP taking these medications       Calcium Carb-Cholecalciferol (CALCIUM 600-D) 600-400 MG-UNIT TABS Comments:   Reason for Stopping:               Discharge instructions:    Discharge Procedure Orders  Medication Therapy Management Referral   Referral Type: Med Therapy Management     General info for SNF   Order Comments: Length of Stay Estimate: Short Term Care: Estimated # of Days <30  Condition at Discharge: Improving  Level of care:board and care  Rehabilitation Potential: Fair  Admission H&P remains valid and up-to-date: Yes  Recent Chemotherapy: N/A  Use Nursing Home Standing Orders: Yes     Reason for your hospital stay   Order Comments: You were hospitalized because of non-healing wound in left leg. You required IV antibiotics as this wound was infected. You will need 1 additional week of IV antibiotics.     Activity - Up ad aviva   Order Specific Question Answer Comments   Is discharge order? Yes      Follow Up and recommended labs and tests   Order Comments: Follow up with Nursing home physician.  No follow up labs or test are needed.  Follow up with primary care provider in 1 weeks.  No follow up labs or test are needed.    Continue to follow with your pulmonary transplant team as you have been doing before admission.     Wound care   Order Comments: Site:   Left leg  Instructions:  Continue with daily wound cleaning and dressing changes. LLE wounds:Apply polymem foam dressing over both wounds (no need to cleanse wounds first)  Secure with tape or kerlix roll gauze.  Change daily.     Diet   Order Comments: Follow this diet upon discharge: Orders Placed This Encounter     Regular Diet Adult   Order Specific Question Answer Comments   Is discharge order? Yes            Patient seen and discussed with attending physician Dr. Stewart the day of discharge.    Chad Pickens MD PhD  Internal Medicine PGY-2  P: [phone]    Physician Attestation   I, Vivian Stewart, saw and evaluated this patient prior to discharge.  I discussed the patient with the resident and agree with plan of care as documented in the resident note.      I personally reviewed vital signs, medications, labs and imaging.    I personally spent 30 minutes on discharge activities.    Vivian Stewart  Date of Service (when I saw the patient): 10/9/17

## 2017-10-10 NOTE — TELEPHONE ENCOUNTER
MTM referral from: Transitions of Care (recent hospital discharge or ED visit)    MTM referral outreach attempt #1 on October 10, 2017 at 1:59 PM      Outcome: Patient is not interested at this time because discharged to long term acute care facility, will route to MTM Pharmacist/Provider as an FYI. Thank you for the referral.     Renee Mckinley, MTM Coordinator Intern

## 2017-10-13 ENCOUNTER — TELEPHONE (OUTPATIENT)
Dept: TRANSPLANT | Facility: CLINIC | Age: 62
End: 2017-10-13

## 2017-10-13 ENCOUNTER — DOCUMENTATION ONLY (OUTPATIENT)
Dept: TRANSPLANT | Facility: CLINIC | Age: 62
End: 2017-10-13

## 2017-10-13 DIAGNOSIS — S81.809A OPEN LEG WOUND: ICD-10-CM

## 2017-10-13 DIAGNOSIS — Z94.2 LUNG REPLACED BY TRANSPLANT (H): Primary | ICD-10-CM

## 2017-10-13 NOTE — LETTER
PHYSICIAN ORDERS      DATE & TIME ISSUED: 2017 8:34 AM  PATIENT NAME: Aubrey Duncan   : 1955     [facility] MR# [if applicable]: [id number]     DIAGNOSIS:  Lung Transplant  Z94.2   Patient with PICC line for IV antibiotics.  After antibiotics are completed, please supply patient with materials/education and solutions to maintain line for additional 7 days.      Any questions please call: [phone]          Annita Mckeon MD

## 2017-10-16 ENCOUNTER — HOME INFUSION (PRE-WILLOW HOME INFUSION) (OUTPATIENT)
Dept: PHARMACY | Facility: CLINIC | Age: 62
End: 2017-10-16

## 2017-10-16 ENCOUNTER — AMBULATORY - GICH (OUTPATIENT)
Dept: GERIATRICS | Facility: OTHER | Age: 62
End: 2017-10-16

## 2017-10-16 DIAGNOSIS — T14.8XXA OTHER INJURY OF UNSPECIFIED BODY REGION, INITIAL ENCOUNTER (CODE): ICD-10-CM

## 2017-10-16 DIAGNOSIS — L08.9 LOCAL INFECTION OF SKIN AND SUBCUTANEOUS TISSUE: ICD-10-CM

## 2017-10-16 NOTE — PROGRESS NOTES
Therapy: ABX  Insurance: Medicare and Senior Gold (ABX is not covered in the home, but would be covered in clinic or short term TCU)  Ded: $  Met: $    Co-Insurance:   Max Out of Pocket: $  Met: $    Please contact Intake with any questions, [phone] or In Basket pool, [facility] ([phone]).  In reference to admission date 10/2/17 to check IV ABX coverage

## 2017-10-17 ENCOUNTER — AMBULATORY - GICH (OUTPATIENT)
Dept: SCHEDULING | Facility: OTHER | Age: 62
End: 2017-10-17

## 2017-10-17 ENCOUNTER — HOSPITAL ENCOUNTER (INPATIENT)
Facility: CLINIC | Age: 62
LOS: 1 days | Discharge: HOME | DRG: 694 | End: 2017-10-18
Attending: UROLOGY | Admitting: UROLOGY
Payer: MEDICARE

## 2017-10-17 ENCOUNTER — TRANSFERRED RECORDS (OUTPATIENT)
Dept: HEALTH INFORMATION MANAGEMENT | Facility: CLINIC | Age: 62
End: 2017-10-17

## 2017-10-17 ENCOUNTER — HISTORY (OUTPATIENT)
Dept: EMERGENCY MEDICINE | Facility: OTHER | Age: 62
End: 2017-10-17

## 2017-10-17 DIAGNOSIS — T14.8XXA WOUND INFECTION: ICD-10-CM

## 2017-10-17 DIAGNOSIS — L08.9 WOUND INFECTION: ICD-10-CM

## 2017-10-17 DIAGNOSIS — N20.1 URETERAL STONE: Primary | ICD-10-CM

## 2017-10-17 LAB
ALBUMIN UR-MCNC: NEGATIVE MG/DL
ANION GAP SERPL CALCULATED.3IONS-SCNC: 10 MMOL/L (ref 3–14)
APPEARANCE UR: ABNORMAL
BILIRUB UR QL STRIP: NEGATIVE
BUN SERPL-MCNC: 22 MG/DL (ref 7–30)
CALCIUM SERPL-MCNC: 8 MG/DL (ref 8.5–10.1)
CHLORIDE SERPL-SCNC: 106 MMOL/L (ref 94–109)
CO2 SERPL-SCNC: 22 MMOL/L (ref 20–32)
COLOR UR AUTO: YELLOW
CREAT SERPL-MCNC: 1.26 MG/DL (ref 0.66–1.25)
ERYTHROCYTE [DISTWIDTH] IN BLOOD BY AUTOMATED COUNT: 13.5 % (ref 10–15)
ERYTHROCYTE [DISTWIDTH] IN BLOOD BY AUTOMATED COUNT: 13.6 % (ref 10–15)
GFR SERPL CREATININE-BSD FRML MDRD: 58 ML/MIN/1.7M2
GLUCOSE SERPL-MCNC: 183 MG/DL (ref 70–99)
GLUCOSE UR STRIP-MCNC: 300 MG/DL
HCT VFR BLD AUTO: 37 % (ref 40–53)
HCT VFR BLD AUTO: 37.8 % (ref 40–53)
HGB BLD-MCNC: 12.5 G/DL (ref 13.3–17.7)
HGB BLD-MCNC: 12.6 G/DL (ref 13.3–17.7)
HGB UR QL STRIP: ABNORMAL
INR PPP: 1.14 (ref 0.86–1.14)
KETONES UR STRIP-MCNC: 10 MG/DL
LEUKOCYTE ESTERASE UR QL STRIP: NEGATIVE
MCH RBC QN AUTO: 33.2 PG (ref 26.5–33)
MCH RBC QN AUTO: 33.5 PG (ref 26.5–33)
MCHC RBC AUTO-ENTMCNC: 33.3 G/DL (ref 31.5–36.5)
MCHC RBC AUTO-ENTMCNC: 33.8 G/DL (ref 31.5–36.5)
MCV RBC AUTO: 101 FL (ref 78–100)
MCV RBC AUTO: 98 FL (ref 78–100)
MUCOUS THREADS #/AREA URNS LPF: PRESENT /LPF
NITRATE UR QL: NEGATIVE
OVAL FAT BODIES #/AREA URNS HPF: ABNORMAL /HPF
PH UR STRIP: 5.5 PH (ref 5–7)
PLATELET # BLD AUTO: 119 10E9/L (ref 150–450)
PLATELET # BLD AUTO: 8 10E9/L (ref 150–450)
POTASSIUM SERPL-SCNC: 3.9 MMOL/L (ref 3.4–5.3)
RBC # BLD AUTO: 3.76 10E12/L (ref 4.4–5.9)
RBC # BLD AUTO: 3.77 10E12/L (ref 4.4–5.9)
RBC #/AREA URNS AUTO: >182 /HPF (ref 0–2)
SODIUM SERPL-SCNC: 138 MMOL/L (ref 133–144)
SOURCE: ABNORMAL
SP GR UR STRIP: 1.02 (ref 1–1.03)
SQUAMOUS #/AREA URNS AUTO: <1 /HPF (ref 0–1)
TRANS CELLS #/AREA URNS HPF: <1 /HPF (ref 0–1)
UROBILINOGEN UR STRIP-MCNC: NORMAL MG/DL (ref 0–2)
WBC # BLD AUTO: 1.9 10E9/L (ref 4–11)
WBC # BLD AUTO: 2.2 10E9/L (ref 4–11)
WBC #/AREA URNS AUTO: 15 /HPF (ref 0–2)

## 2017-10-17 PROCEDURE — 36592 COLLECT BLOOD FROM PICC: CPT | Performed by: UROLOGY

## 2017-10-17 PROCEDURE — 80048 BASIC METABOLIC PNL TOTAL CA: CPT | Performed by: UROLOGY

## 2017-10-17 PROCEDURE — 87086 URINE CULTURE/COLONY COUNT: CPT | Performed by: UROLOGY

## 2017-10-17 PROCEDURE — 85610 PROTHROMBIN TIME: CPT | Performed by: STUDENT IN AN ORGANIZED HEALTH CARE EDUCATION/TRAINING PROGRAM

## 2017-10-17 PROCEDURE — 25000132 ZZH RX MED GY IP 250 OP 250 PS 637: Mod: GY | Performed by: UROLOGY

## 2017-10-17 PROCEDURE — 81001 URINALYSIS AUTO W/SCOPE: CPT | Performed by: UROLOGY

## 2017-10-17 PROCEDURE — 25000128 H RX IP 250 OP 636: Performed by: UROLOGY

## 2017-10-17 PROCEDURE — A9270 NON-COVERED ITEM OR SERVICE: HCPCS | Mod: GY | Performed by: UROLOGY

## 2017-10-17 PROCEDURE — 12000026 ZZH R&B TRANSPLANT

## 2017-10-17 PROCEDURE — 25000131 ZZH RX MED GY IP 250 OP 636 PS 637: Mod: GY | Performed by: UROLOGY

## 2017-10-17 PROCEDURE — 85027 COMPLETE CBC AUTOMATED: CPT | Performed by: STUDENT IN AN ORGANIZED HEALTH CARE EDUCATION/TRAINING PROGRAM

## 2017-10-17 PROCEDURE — 36592 COLLECT BLOOD FROM PICC: CPT | Performed by: STUDENT IN AN ORGANIZED HEALTH CARE EDUCATION/TRAINING PROGRAM

## 2017-10-17 PROCEDURE — 25000125 ZZHC RX 250: Performed by: UROLOGY

## 2017-10-17 PROCEDURE — 85027 COMPLETE CBC AUTOMATED: CPT | Performed by: UROLOGY

## 2017-10-17 PROCEDURE — 25000131 ZZH RX MED GY IP 250 OP 636 PS 637: Mod: GY | Performed by: NURSE PRACTITIONER

## 2017-10-17 RX ORDER — NALOXONE HYDROCHLORIDE 0.4 MG/ML
.1-.4 INJECTION, SOLUTION INTRAMUSCULAR; INTRAVENOUS; SUBCUTANEOUS
Status: DISCONTINUED | OUTPATIENT
Start: 2017-10-17 | End: 2017-10-18 | Stop reason: HOSPADM

## 2017-10-17 RX ORDER — DOCUSATE SODIUM 100 MG/1
100 CAPSULE, LIQUID FILLED ORAL 2 TIMES DAILY
Status: DISCONTINUED | OUTPATIENT
Start: 2017-10-17 | End: 2017-10-18 | Stop reason: CLARIF

## 2017-10-17 RX ORDER — VALGANCICLOVIR 450 MG/1
900 TABLET, FILM COATED ORAL DAILY
Status: DISCONTINUED | OUTPATIENT
Start: 2017-10-18 | End: 2017-10-18 | Stop reason: HOSPADM

## 2017-10-17 RX ORDER — LOPERAMIDE HCL 2 MG
2 CAPSULE ORAL 4 TIMES DAILY
Status: DISCONTINUED | OUTPATIENT
Start: 2017-10-17 | End: 2017-10-18 | Stop reason: HOSPADM

## 2017-10-17 RX ORDER — PREDNISONE 5 MG/1
5 TABLET ORAL DAILY
Status: DISCONTINUED | OUTPATIENT
Start: 2017-10-18 | End: 2017-10-18 | Stop reason: HOSPADM

## 2017-10-17 RX ORDER — HYDROMORPHONE HYDROCHLORIDE 2 MG/1
2 TABLET ORAL EVERY 6 HOURS PRN
Status: DISCONTINUED | OUTPATIENT
Start: 2017-10-17 | End: 2017-10-18 | Stop reason: HOSPADM

## 2017-10-17 RX ORDER — HYDROMORPHONE HYDROCHLORIDE 1 MG/ML
.3-.5 INJECTION, SOLUTION INTRAMUSCULAR; INTRAVENOUS; SUBCUTANEOUS
Status: DISCONTINUED | OUTPATIENT
Start: 2017-10-17 | End: 2017-10-18 | Stop reason: HOSPADM

## 2017-10-17 RX ORDER — FUROSEMIDE 20 MG
20 TABLET ORAL DAILY
Status: DISCONTINUED | OUTPATIENT
Start: 2017-10-17 | End: 2017-10-18 | Stop reason: HOSPADM

## 2017-10-17 RX ORDER — PROCHLORPERAZINE 25 MG
25 SUPPOSITORY, RECTAL RECTAL EVERY 12 HOURS PRN
Status: DISCONTINUED | OUTPATIENT
Start: 2017-10-17 | End: 2017-10-18 | Stop reason: HOSPADM

## 2017-10-17 RX ORDER — PREDNISONE 2.5 MG/1
2.5 TABLET ORAL EVERY EVENING
Status: DISCONTINUED | OUTPATIENT
Start: 2017-10-17 | End: 2017-10-18 | Stop reason: HOSPADM

## 2017-10-17 RX ORDER — SODIUM CHLORIDE 9 MG/ML
INJECTION, SOLUTION INTRAVENOUS CONTINUOUS
Status: DISCONTINUED | OUTPATIENT
Start: 2017-10-17 | End: 2017-10-18 | Stop reason: HOSPADM

## 2017-10-17 RX ORDER — SULFAMETHOXAZOLE AND TRIMETHOPRIM 400; 80 MG/1; MG/1
1 TABLET ORAL
Status: DISCONTINUED | OUTPATIENT
Start: 2017-10-18 | End: 2017-10-18 | Stop reason: HOSPADM

## 2017-10-17 RX ORDER — ACETAMINOPHEN 325 MG/1
650 TABLET ORAL EVERY 4 HOURS PRN
Status: DISCONTINUED | OUTPATIENT
Start: 2017-10-17 | End: 2017-10-18 | Stop reason: HOSPADM

## 2017-10-17 RX ORDER — ALBUTEROL SULFATE 90 UG/1
2 AEROSOL, METERED RESPIRATORY (INHALATION) EVERY 6 HOURS PRN
Status: DISCONTINUED | OUTPATIENT
Start: 2017-10-17 | End: 2017-10-18 | Stop reason: HOSPADM

## 2017-10-17 RX ORDER — LISINOPRIL 5 MG/1
5 TABLET ORAL DAILY
Status: DISCONTINUED | OUTPATIENT
Start: 2017-10-17 | End: 2017-10-18 | Stop reason: HOSPADM

## 2017-10-17 RX ORDER — VALGANCICLOVIR 450 MG/1
450 TABLET, FILM COATED ORAL 2 TIMES DAILY
Status: DISCONTINUED | OUTPATIENT
Start: 2017-10-17 | End: 2017-10-17

## 2017-10-17 RX ORDER — PROCHLORPERAZINE MALEATE 5 MG
5-10 TABLET ORAL EVERY 6 HOURS PRN
Status: DISCONTINUED | OUTPATIENT
Start: 2017-10-17 | End: 2017-10-18 | Stop reason: HOSPADM

## 2017-10-17 RX ORDER — PREDNISONE 5 MG/1
5 TABLET ORAL 2 TIMES DAILY WITH MEALS
Status: DISCONTINUED | OUTPATIENT
Start: 2017-10-17 | End: 2017-10-17

## 2017-10-17 RX ORDER — TACROLIMUS 1 MG/1
3 CAPSULE ORAL
Status: DISCONTINUED | OUTPATIENT
Start: 2017-10-17 | End: 2017-10-18 | Stop reason: HOSPADM

## 2017-10-17 RX ORDER — METOPROLOL TARTRATE 25 MG/1
25 TABLET, FILM COATED ORAL 2 TIMES DAILY
Status: DISCONTINUED | OUTPATIENT
Start: 2017-10-17 | End: 2017-10-18 | Stop reason: HOSPADM

## 2017-10-17 RX ORDER — ONDANSETRON 4 MG/1
4 TABLET, ORALLY DISINTEGRATING ORAL EVERY 6 HOURS PRN
Status: DISCONTINUED | OUTPATIENT
Start: 2017-10-17 | End: 2017-10-18 | Stop reason: HOSPADM

## 2017-10-17 RX ORDER — ONDANSETRON 2 MG/ML
4 INJECTION INTRAMUSCULAR; INTRAVENOUS EVERY 6 HOURS PRN
Status: DISCONTINUED | OUTPATIENT
Start: 2017-10-17 | End: 2017-10-18 | Stop reason: HOSPADM

## 2017-10-17 RX ORDER — ALUMINA, MAGNESIA, AND SIMETHICONE 2400; 2400; 240 MG/30ML; MG/30ML; MG/30ML
15-30 SUSPENSION ORAL EVERY 4 HOURS PRN
Status: DISCONTINUED | OUTPATIENT
Start: 2017-10-17 | End: 2017-10-18 | Stop reason: HOSPADM

## 2017-10-17 RX ADMIN — OMEPRAZOLE 20 MG: 20 CAPSULE, DELAYED RELEASE ORAL at 16:27

## 2017-10-17 RX ADMIN — SODIUM CHLORIDE: 9 INJECTION, SOLUTION INTRAVENOUS at 11:27

## 2017-10-17 RX ADMIN — TACROLIMUS 3 MG: 1 CAPSULE ORAL at 17:40

## 2017-10-17 RX ADMIN — Medication 25 MG: at 20:28

## 2017-10-17 RX ADMIN — LOPERAMIDE HYDROCHLORIDE 2 MG: 2 CAPSULE ORAL at 20:27

## 2017-10-17 RX ADMIN — LOPERAMIDE HYDROCHLORIDE 2 MG: 2 CAPSULE ORAL at 12:13

## 2017-10-17 RX ADMIN — PROCHLORPERAZINE EDISYLATE 5 MG: 5 INJECTION INTRAMUSCULAR; INTRAVENOUS at 15:23

## 2017-10-17 RX ADMIN — PREDNISONE 2.5 MG: 2.5 TABLET ORAL at 17:40

## 2017-10-17 RX ADMIN — PREDNISONE 5 MG: 5 TABLET ORAL at 11:30

## 2017-10-17 RX ADMIN — HYDROMORPHONE HYDROCHLORIDE 2 MG: 2 TABLET ORAL at 15:22

## 2017-10-17 RX ADMIN — SODIUM CHLORIDE: 9 INJECTION, SOLUTION INTRAVENOUS at 21:36

## 2017-10-17 RX ADMIN — METOPROLOL TARTRATE 25 MG: 25 TABLET ORAL at 20:27

## 2017-10-17 RX ADMIN — ONDANSETRON 4 MG: 4 TABLET, ORALLY DISINTEGRATING ORAL at 11:33

## 2017-10-17 RX ADMIN — LOPERAMIDE HYDROCHLORIDE 2 MG: 2 CAPSULE ORAL at 16:27

## 2017-10-17 RX ADMIN — PROCHLORPERAZINE EDISYLATE 5 MG: 5 INJECTION INTRAMUSCULAR; INTRAVENOUS at 14:08

## 2017-10-17 RX ADMIN — LISINOPRIL 5 MG: 5 TABLET ORAL at 20:27

## 2017-10-17 ASSESSMENT — ACTIVITIES OF DAILY LIVING (ADL)
TRANSFERRING: 0-->INDEPENDENT
DRESS: 0-->INDEPENDENT
BATHING: 0-->INDEPENDENT
AMBULATION: 0-->INDEPENDENT
SWALLOWING: 0-->SWALLOWS FOODS/LIQUIDS WITHOUT DIFFICULTY
RETIRED_COMMUNICATION: 0-->UNDERSTANDS/COMMUNICATES WITHOUT DIFFICULTY
COGNITION: 0 - NO COGNITION ISSUES REPORTED
RETIRED_EATING: 0-->INDEPENDENT
TOILETING: 0-->INDEPENDENT
FALL_HISTORY_WITHIN_LAST_SIX_MONTHS: NO
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
## 2017-10-17 NOTE — IP AVS SNAPSHOT
[address] [address]    Phone:  [phone]                                       After Visit Summary   10/17/2017    Aubrey Duncan    MRN: [id number]           After Visit Summary Signature Page     I have received my discharge instructions, and my questions have been answered. I have discussed any challenges I see with this plan with the nurse or doctor.    ..........................................................................................................................................  Patient/Patient Representative Signature      ..........................................................................................................................................  Patient Representative Print Name and Relationship to Patient    ..................................................               ................................................  Date                                            Time    ..........................................................................................................................................  Reviewed by Signature/Title    ...................................................              ..............................................  Date                                                            Time

## 2017-10-17 NOTE — LETTER
Transition Communication Hand-off for Care Transitions to Next Level of Care Provider    Name: Aubrey Duncan  MRN #: [id number]  Primary Care Provider: David Jiang     Primary Clinic: [facility] [address]  [place] [address]     Reason for Hospitalization:  Kidney Stone   ICA stenosis   Renal depression  Ureteral stone  LEFT URETERAL STONE  Admit Date/Time: 10/17/2017 10:19 AM  Discharge Date: 10.18.17  Payor Source: Payor: MEDICARE / Plan: MEDICARE / Product Type: Medicare /              Reason for Communication Hand-off Referral: Other ureteral stent for K stone    Discharge Plan:       Concern for non-adherence with plan of care:   Y/N N  Discharge Needs Assessment:        Follow-up specialty is recommended: Yes    Follow-up plan:  Future Appointments  Date Time Provider Department Center   10/31/2017 8:00 AM UC LAB UCLAB [facility]   10/31/2017 8:30 AM UCXR1 UCCXR [facility]   10/31/2017 9:00 AM UCDX1 UCCDEXA [facility]   10/31/2017 9:30 AM UC PFL C UCPFT [facility]   10/31/2017 10:20 AM Kirk Broussard MD UCCLS [facility]   12/19/2017 3:30 PM [facility]       Any outstanding tests or procedures:              Key Recommendations:      Hetal Pathak    AVS/Discharge Summary is the source of truth; this is a helpful guide for improved communication of patient story

## 2017-10-17 NOTE — IP AVS SNAPSHOT
MRN:[id number]                      After Visit Summary   10/17/2017    Aubrey Duncan    MRN: [id number]           Thank you!     Thank you for choosing [facility] for your care. Our goal is always to provide you with excellent care. Hearing back from our patients is one way we can continue to improve our services. Please take a few minutes to complete the written survey that you may receive in the mail after you visit with us. Thank you!        Patient Information     Date Of Birth          1955        Designated Caregiver       Most Recent Value    Caregiver    Will someone help with your care after discharge? yes    Name of designated caregiver Kyung    Phone number of caregiver [phone]    Caregiver address [place] [address] [[address]]      About your hospital stay     You were admitted on:  October 17, 2017 You last received care in the:  [address] [facility]    You were discharged on:  October 18, 2017        Reason for your hospital stay       You were in the hospital for kidney stone                  Who to Call     For medical emergencies, please call 911.  For non-urgent questions about your medical care, please call your primary care provider or clinic, [phone]  For questions related to your surgery, please call your surgery clinic        Attending Provider     Provider Specialty    Weight, Darwin Loomis MD Urology       Primary Care Provider Office Phone # Fax #    David Terrell Jiang [phone]       When to contact your care team       - Call or return sooner than your regularly scheduled visit if you develop any of the following: fever (greater than 101.5), uncontrolled pain, uncontrolled nausea or vomiting, as well as increased redness, swelling, or drainage from your wound (if present).  Call or return to ER if you have blood in urine that is getting worse (rather than getting better) or if you are passing clots and unable to urinate              "     After Care Instructions     Activity       Activity  - No strenuous exercise for 6 weeks.  - No lifting, pushing, pulling more than 10 pounds for 6 weeks.   - Do not strain with bowel movements.  - Do not drive until you can press the brake pedal quickly and fully without pain.   - Do not operate a motor vehicle while taking narcotic pain medications (if prescribed)            Diet       Resume previous diet upon discharge            Discharge Instructions       Medications  - Take antibiotics as prescribed (if prescribed)  - Transition from narcotic pain medications to tylenol (acetaminophen) as you are able.  Wean yourself off all pain medications as you are able.  - Some pain medications contain both tylenol (acetaminophen) and a narcotic (Norco, vicodin, percocet), do not take more than 4,000mg of Tylenol (acetaminophen) from all sources in any 24 hour period.  - Narcotics can make you constipated.  Take over the counter fiber (metamucil or benefiber) and stool softeners (miralax, docusate or senna) while taking narcotic pain medications, but stop if you develop diarrhea.  - No driving or operating machinery while taking narcotic pain medications                  Follow-up Appointments     [facility]ult [facility] Follow-up and recommended labs and tests       Follow-up:  -Return to clinic to see Dr. Lyn as instructed.  Contact our clinic using the numbers below if you have questions about this.    Phone numbers:   - Monday through Friday 8am to 4:30pm: Call [phone] with questions or to schedule or confirm appointment.    - Nights or weekends: call the after hours emergency pager - [phone] and tell the  \"I would like to page the Urology Resident on call.\"  - For emergencies, call [phone]                  Your next 10 appointments already scheduled     Oct 31, 2017  8:00 AM CDT   Lab with  LAB    [facility] ([facility])    [address] " [address]   [phone]            Oct 31, 2017  8:30 AM CDT   (Arrive by 8:15 AM)   XR CHEST 2 VIEWS with UCXR1   [facility] Xray ([facility])    [address] [address] [phone]           Please bring a list of your current medicines to your exam. (Include vitamins, minerals and over-thecounter medicines.) Leave your valuables at home.  Tell your doctor if there is a chance you may be pregnant.  You do not need to do anything special for this exam.            Oct 31, 2017  9:00 AM CDT   DX HIP/PELVIS/SPINE with UCDX1   [facility]a ([facility])    [address] [address] [phone]           Please do not take any of the following 24 hours prior to the day of your exam: vitamins, calcium tablets, antacids.  If possible, please wear clothes without metal (snaps, zippers). A sweatsuit works well.            Oct 31, 2017  9:30 AM CDT   SIX MINUTE WALK with UC PFL C, UC PFL 6 MINUTE WALK 2   [facility] Pulmonary Function Testing ([facility])    [address] [address] [phone]            Oct 31, 2017 10:20 AM CDT   (Arrive by 10:05 AM)   Return Lung Transplant with Kirk Broussard MD   [facility] ([facility])    [address] [place] [id number]   [phone]            Nov 09, 2017   Procedure with Osvaldo Marquis MD   [facility] [place], Same Day Surgery (--)    [address] [facility] [id number] [phone]              Further instructions from your care team         WOC nurse  Left leg trauma wound slowly healing             Plan:     Nursing to notify the Provider(s) and re-consult the WOC Nurse if wound(s) deteriorate(s).    Plan of care for wound located on left lateral leg wound:  cleanse the  "wound with microklenz moisten gauze pat dry cut a piece of PolyMem roll dressing to fit the size of the wound and place over the wound and wrap with Kerlix change dressing daily and as needed if soiled       Pending Results     Date and Time Order Name Status Description    10/18/2017 1423 Urine Culture Aerobic Bacterial Preliminary             Statement of Approval     Ordered          10/18/17 1616  I have reviewed and agree with all the recommendations and orders detailed in this document.  EFFECTIVE NOW     Approved and electronically signed by:  Dimitri Rodriguez MD             Admission Information     Date & Time Provider Department Dept. Phone    10/17/2017 Weight, Darwin Loomis MD [address] [facility] [place] [phone]      Your Vitals Were     Blood Pressure Pulse Temperature Respirations Height Weight    174/90 (BP Location: Right arm) 84 98.2  F (36.8  C) (Oral) 21 1.727 m (5' 8\") 74.7 kg (164 lb 11.2 oz)    Pulse Oximetry BMI (Body Mass Index)                96% 25.04 kg/m2          [facility] Information     [facility] [facility] gives you secure access to your electronic health record. If you see a primary care provider, you can also send messages to your care team and make appointments. If you have questions, please call your primary care clinic.  If you do not have a primary care provider, please call [phone] and they will assist you.        Care EveryWhere ID     This is your Care EveryWhere ID. This could be used by other organizations to access your [facility] medical records  [id number]        Equal Access to Services     JANET CANTOR AH: Hadii mario Dalton, waaxda luqadaha, qaybta kaalmada blakeyada, daniel machado. So [facility] [phone].    ATENCIÓN: Si habla español, tiene a neely disposición servicios gratuitos de asistencia lingüística. Llame al [phone].    We comply with applicable federal civil rights laws and [place] laws. We do not discriminate on the basis " of race, color, national origin, age, disability, sex, sexual orientation, or gender identity.               Review of your medicines      START taking        Dose / Directions    phenazopyridine 97.5 MG tablet   Commonly known as:  AZO        Dose:  195 mg   Take 2 tablets (195 mg) by mouth 3 times daily   Quantity:  12 tablet   Refills:  0       tamsulosin 0.4 MG capsule   Commonly known as:  FLOMAX        Dose:  0.4 mg   Take 1 capsule (0.4 mg) by mouth daily   Quantity:  30 capsule   Refills:  1         CONTINUE these medicines which may have CHANGED, or have new prescriptions. If we are uncertain of the size of tablets/capsules you have at home, strength may be listed as something that might have changed.        Dose / Directions    tacrolimus 1 MG capsule   Commonly known as:  GENERIC EQUIVALENT   This may have changed:  additional instructions   Used for:  Lung transplant status, bilateral (H)        Take 3 caps every am and 3 mg every pm.   Quantity:  180 capsule   Refills:  12         CONTINUE these medicines which have NOT CHANGED        Dose / Directions    albuterol 108 (90 BASE) MCG/ACT Inhaler   Commonly known as:  PROAIR HFA/PROVENTIL HFA/VENTOLIN HFA   Used for:  Wheezing        Dose:  2 puff   Inhale 2 puffs into the lungs every 6 hours as needed for shortness of breath / dyspnea or wheezing   Quantity:  3 Inhaler   Refills:  11       alendronate 70 MG tablet   Commonly known as:  FOSAMAX   Used for:  Osteopenia        Dose:  70 mg   Take 1 tablet (70 mg) by mouth every 7 days Take with 8 ounces water, at least 60 min before breakfast, and stay upright for at least 30 min after dose.   Quantity:  4 tablet   Refills:  11       azaTHIOprine 50 MG tablet   Commonly known as:  IMURAN   Used for:  Lung replaced by transplant (H)        Dose:  25 mg   Take 0.5 tablets (25 mg) by mouth daily   Quantity:  15 tablet   Refills:  11       calcium-vitamin D 600-400 MG-UNIT per tablet   Commonly known as:   CALTRATE   Used for:  Lung transplant status, bilateral (H)        Dose:  1 tablet   Take 1 tablet by mouth 2 times daily (with meals)   Quantity:  60 tablet   Refills:  12       furosemide 20 MG tablet   Commonly known as:  LASIX   Used for:  Hypertension        TAKE ONE TABLET BY MOUTH EVERY DAY   Quantity:  30 tablet   Refills:  11       HYDROmorphone 2 MG tablet   Commonly known as:  DILAUDID   Used for:  Wound infection        Dose:  2 mg   Take 1 tablet (2 mg) by mouth every 6 hours as needed for moderate to severe pain   Quantity:  15 tablet   Refills:  0       LISINOPRIL PO        Dose:  5 mg   Take 5 mg by mouth daily   Refills:  0       loperamide 2 MG capsule   Commonly known as:  IMODIUM   Used for:  Lung transplant status, bilateral (H)        Dose:  2 mg   Take 1 capsule (2 mg) by mouth 4 times daily as needed for diarrhea   Quantity:  120 capsule   Refills:  12       metoprolol 25 MG tablet   Commonly known as:  LOPRESSOR   Used for:  Hypertension        TAKE ONE TABLET BY MOUTH TWICE A DAY   Quantity:  60 tablet   Refills:  12       multivitamin, therapeutic Tabs tablet   Used for:  Lung transplant status, bilateral (H)        Dose:  1 tablet   Take 1 tablet by mouth daily   Quantity:  30 tablet   Refills:  12       omeprazole 20 MG CR capsule   Commonly known as:  priLOSEC   Used for:  Lung transplant status, bilateral (H)        Dose:  20 mg   Take 1 capsule (20 mg) by mouth 2 times daily (before meals)   Quantity:  60 capsule   Refills:  12       order for DME   Used for:  Wound infection        Equipment being ordered: Polymem Non-Adhesive Pad Treatment Diagnosis: Wound infection   Quantity:  30 pad   Refills:  1       predniSONE 5 MG tablet   Commonly known as:  DELTASONE   Used for:  Lung transplant status, bilateral (H)        TAKE ONE TABLET BY MOUTH EVERY MORNING AND TAKE ONE-HALF TABLET BY MOUTH EVERY EVENING   Quantity:  45 tablet   Refills:  12       sildenafil 25 MG tablet   Commonly  known as:  VIAGRA   Used for:  ED (erectile dysfunction)        Dose:  25 mg   Take 1 tablet (25 mg) by mouth as needed for erectile dysfunction   Quantity:  30 tablet   Refills:  0       sulfamethoxazole-trimethoprim 400-80 MG per tablet   Commonly known as:  BACTRIM/SEPTRA   Used for:  Lung replaced by transplant (H)        TAKE ONE TABLET BY MOUTH THREE TIMES A WEEK   Quantity:  12 tablet   Refills:  11       valGANciclovir 450 MG tablet   Commonly known as:  VALCYTE   Used for:  CMV (cytomegalovirus infection) (H), Lung replaced by transplant (H)        TAKE ONE TABLETS (450MG) BY MOUTH TWO TIMES A DAY   Quantity:  60 tablet   Refills:  11         STOP taking     piperacillin-tazobactam 3-0.375 GM vial   Commonly known as:  ZOSYN                Where to get your medicines      These medications were sent to [facility] [address] [address]     Phone:  [phone]     phenazopyridine 97.5 MG tablet    tamsulosin 0.4 MG capsule         Some of these will need a paper prescription and others can be bought over the counter. Ask your nurse if you have questions.     Bring a paper prescription for each of these medications     HYDROmorphone 2 MG tablet                Protect others around you: Learn how to safely use, store and throw away your medicines at www.disposemymeds.org.             Medication List: This is a list of all your medications and when to take them. Check marks below indicate your daily home schedule. Keep this list as a reference.      Medications           Morning Afternoon Evening Bedtime As Needed    albuterol 108 (90 BASE) MCG/ACT Inhaler   Commonly known as:  PROAIR HFA/PROVENTIL HFA/VENTOLIN HFA   Inhale 2 puffs into the lungs every 6 hours as needed for shortness of breath / dyspnea or wheezing                                alendronate 70 MG tablet   Commonly known as:  FOSAMAX   Take 1 tablet (70 mg) by mouth  every 7 days Take with 8 ounces water, at least 60 min before breakfast, and stay upright for at least 30 min after dose.                                azaTHIOprine 50 MG tablet   Commonly known as:  IMURAN   Take 0.5 tablets (25 mg) by mouth daily   Last time this was given:  25 mg on 10/17/2017  8:28 PM                                calcium-vitamin D 600-400 MG-UNIT per tablet   Commonly known as:  CALTRATE   Take 1 tablet by mouth 2 times daily (with meals)                                furosemide 20 MG tablet   Commonly known as:  LASIX   TAKE ONE TABLET BY MOUTH EVERY DAY   Last time this was given:  20 mg on 10/18/2017  7:52 AM                                HYDROmorphone 2 MG tablet   Commonly known as:  DILAUDID   Take 1 tablet (2 mg) by mouth every 6 hours as needed for moderate to severe pain   Last time this was given:  2 mg on 10/18/2017  4:12 AM                                LISINOPRIL PO   Take 5 mg by mouth daily   Last time this was given:  5 mg on 10/17/2017  8:27 PM                                loperamide 2 MG capsule   Commonly known as:  IMODIUM   Take 1 capsule (2 mg) by mouth 4 times daily as needed for diarrhea   Last time this was given:  2 mg on 10/18/2017  7:53 AM                                metoprolol 25 MG tablet   Commonly known as:  LOPRESSOR   TAKE ONE TABLET BY MOUTH TWICE A DAY   Last time this was given:  25 mg on 10/18/2017  7:52 AM                                multivitamin, therapeutic Tabs tablet   Take 1 tablet by mouth daily                                omeprazole 20 MG CR capsule   Commonly known as:  priLOSEC   Take 1 capsule (20 mg) by mouth 2 times daily (before meals)   Last time this was given:  20 mg on 10/18/2017  7:51 AM                                order for DME   Equipment being ordered: Polymem Non-Adhesive Pad Treatment Diagnosis: Wound infection                                phenazopyridine 97.5 MG tablet   Commonly known as:  AZO   Take 2 tablets  (195 mg) by mouth 3 times daily                                predniSONE 5 MG tablet   Commonly known as:  DELTASONE   TAKE ONE TABLET BY MOUTH EVERY MORNING AND TAKE ONE-HALF TABLET BY MOUTH EVERY EVENING   Last time this was given:  5 mg on 10/18/2017  7:52 AM                                sildenafil 25 MG tablet   Commonly known as:  VIAGRA   Take 1 tablet (25 mg) by mouth as needed for erectile dysfunction                                sulfamethoxazole-trimethoprim 400-80 MG per tablet   Commonly known as:  BACTRIM/SEPTRA   TAKE ONE TABLET BY MOUTH THREE TIMES A WEEK   Last time this was given:  1 tablet on 10/18/2017  7:51 AM                                tacrolimus 1 MG capsule   Commonly known as:  GENERIC EQUIVALENT   Take 3 caps every am and 3 mg every pm.   Last time this was given:  3 mg on 10/18/2017  7:50 AM                                tamsulosin 0.4 MG capsule   Commonly known as:  FLOMAX   Take 1 capsule (0.4 mg) by mouth daily                                valGANciclovir 450 MG tablet   Commonly known as:  VALCYTE   TAKE ONE TABLETS (450MG) BY MOUTH TWO TIMES A DAY   Last time this was given:  900 mg on 10/18/2017  7:51 AM

## 2017-10-17 NOTE — PLAN OF CARE
Problem: Patient Care Overview  Goal: Plan of Care/Patient Progress Review  Outcome: No Change  Pt admitted to [facility] from Nursing home in [place] w/ kidney stone around 1030. AVSS on RA. Platelet count critically low at 8, redrawn and found to be 119. UA/UC sent. C/o abdominal and flank pain, given PRN PO dilaudid x1 w/ good relief. C/o nausea, given PRN zofran and compazine with good relief. Regular diet, has only eaten gelatin. NPO at 0000 for stent placement in the OR tomorrow AM. WOC consult placed for wound on LLE. WOC unable to see pt today but advised this RN to order Polymem, dressing was changed. Has only voided x1 for urine sample, will continue to monitor UOP. Last BM yesterday. Had an existing double lumen PICC, NS infusing at 100 cc/hr. Plan for OR tomorrow. Will continue to monitor.

## 2017-10-18 ENCOUNTER — ANESTHESIA EVENT (OUTPATIENT)
Dept: SURGERY | Facility: CLINIC | Age: 62
DRG: 694 | End: 2017-10-18
Payer: MEDICARE

## 2017-10-18 ENCOUNTER — APPOINTMENT (OUTPATIENT)
Dept: GENERAL RADIOLOGY | Facility: CLINIC | Age: 62
DRG: 694 | End: 2017-10-18
Attending: UROLOGY
Payer: MEDICARE

## 2017-10-18 ENCOUNTER — AMBULATORY - GICH (OUTPATIENT)
Dept: SCHEDULING | Facility: OTHER | Age: 62
End: 2017-10-18

## 2017-10-18 ENCOUNTER — ANESTHESIA (OUTPATIENT)
Dept: SURGERY | Facility: CLINIC | Age: 62
DRG: 694 | End: 2017-10-18
Payer: MEDICARE

## 2017-10-18 VITALS
RESPIRATION RATE: 21 BRPM | SYSTOLIC BLOOD PRESSURE: 174 MMHG | TEMPERATURE: 98.2 F | BODY MASS INDEX: 24.96 KG/M2 | HEIGHT: 68 IN | WEIGHT: 164.7 LBS | DIASTOLIC BLOOD PRESSURE: 90 MMHG | OXYGEN SATURATION: 96 % | HEART RATE: 84 BPM

## 2017-10-18 DIAGNOSIS — N20.1 CALCULUS OF URETER: Primary | ICD-10-CM

## 2017-10-18 LAB
ANION GAP SERPL CALCULATED.3IONS-SCNC: 10 MMOL/L (ref 3–14)
BACTERIA SPEC CULT: NO GROWTH
BUN SERPL-MCNC: 19 MG/DL (ref 7–30)
CALCIUM SERPL-MCNC: 8.3 MG/DL (ref 8.5–10.1)
CHLORIDE SERPL-SCNC: 105 MMOL/L (ref 94–109)
CO2 SERPL-SCNC: 22 MMOL/L (ref 20–32)
CREAT SERPL-MCNC: 1.35 MG/DL (ref 0.66–1.25)
ERYTHROCYTE [DISTWIDTH] IN BLOOD BY AUTOMATED COUNT: 13.8 % (ref 10–15)
GFR SERPL CREATININE-BSD FRML MDRD: 53 ML/MIN/1.7M2
GLUCOSE BLDC GLUCOMTR-MCNC: 158 MG/DL (ref 70–99)
GLUCOSE BLDC GLUCOMTR-MCNC: 203 MG/DL (ref 70–99)
GLUCOSE SERPL-MCNC: 158 MG/DL (ref 70–99)
HCT VFR BLD AUTO: 38.6 % (ref 40–53)
HGB BLD-MCNC: 12.2 G/DL (ref 13.3–17.7)
Lab: NORMAL
MCH RBC QN AUTO: 32.1 PG (ref 26.5–33)
MCHC RBC AUTO-ENTMCNC: 31.6 G/DL (ref 31.5–36.5)
MCV RBC AUTO: 102 FL (ref 78–100)
PLATELET # BLD AUTO: 126 10E9/L (ref 150–450)
POTASSIUM SERPL-SCNC: 4.1 MMOL/L (ref 3.4–5.3)
RBC # BLD AUTO: 3.8 10E12/L (ref 4.4–5.9)
SODIUM SERPL-SCNC: 136 MMOL/L (ref 133–144)
SPECIMEN SOURCE: NORMAL
TACROLIMUS BLD-MCNC: 10.4 UG/L (ref 5–15)
TME LAST DOSE: NORMAL H
WBC # BLD AUTO: 2.5 10E9/L (ref 4–11)

## 2017-10-18 PROCEDURE — 85027 COMPLETE CBC AUTOMATED: CPT | Performed by: UROLOGY

## 2017-10-18 PROCEDURE — 25000132 ZZH RX MED GY IP 250 OP 250 PS 637: Mod: GY | Performed by: UROLOGY

## 2017-10-18 PROCEDURE — 25000125 ZZHC RX 250: Performed by: NURSE PRACTITIONER

## 2017-10-18 PROCEDURE — 37000009 ZZH ANESTHESIA TECHNICAL FEE, EACH ADDTL 15 MIN: Performed by: UROLOGY

## 2017-10-18 PROCEDURE — 25000128 H RX IP 250 OP 636: Performed by: ANESTHESIOLOGY

## 2017-10-18 PROCEDURE — C2617 STENT, NON-COR, TEM W/O DEL: HCPCS | Performed by: UROLOGY

## 2017-10-18 PROCEDURE — 36000055 ZZH SURGERY LEVEL 2 W FLUORO 1ST 30 MIN - UMMC: Performed by: UROLOGY

## 2017-10-18 PROCEDURE — 00000146 ZZHCL STATISTIC GLUCOSE BY METER IP

## 2017-10-18 PROCEDURE — 87086 URINE CULTURE/COLONY COUNT: CPT | Performed by: UROLOGY

## 2017-10-18 PROCEDURE — A9270 NON-COVERED ITEM OR SERVICE: HCPCS | Mod: GY | Performed by: NURSE PRACTITIONER

## 2017-10-18 PROCEDURE — 25000131 ZZH RX MED GY IP 250 OP 636 PS 637: Mod: GY | Performed by: NURSE PRACTITIONER

## 2017-10-18 PROCEDURE — 80197 ASSAY OF TACROLIMUS: CPT | Performed by: NURSE PRACTITIONER

## 2017-10-18 PROCEDURE — 97602 WOUND(S) CARE NON-SELECTIVE: CPT

## 2017-10-18 PROCEDURE — 25000132 ZZH RX MED GY IP 250 OP 250 PS 637: Mod: GY | Performed by: NURSE PRACTITIONER

## 2017-10-18 PROCEDURE — 40000277 XR SURGERY CARM FLUORO LESS THAN 5 MIN W STILLS: Mod: TC

## 2017-10-18 PROCEDURE — 99211 OFF/OP EST MAY X REQ PHY/QHP: CPT

## 2017-10-18 PROCEDURE — 25000125 ZZHC RX 250: Performed by: NURSE ANESTHETIST, CERTIFIED REGISTERED

## 2017-10-18 PROCEDURE — 40000170 ZZH STATISTIC PRE-PROCEDURE ASSESSMENT II: Performed by: UROLOGY

## 2017-10-18 PROCEDURE — 71000014 ZZH RECOVERY PHASE 1 LEVEL 2 FIRST HR: Performed by: UROLOGY

## 2017-10-18 PROCEDURE — 80048 BASIC METABOLIC PNL TOTAL CA: CPT | Performed by: UROLOGY

## 2017-10-18 PROCEDURE — 25000128 H RX IP 250 OP 636: Performed by: UROLOGY

## 2017-10-18 PROCEDURE — 25000566 ZZH SEVOFLURANE, EA 15 MIN: Performed by: UROLOGY

## 2017-10-18 PROCEDURE — 36000053 ZZH SURGERY LEVEL 2 EA 15 ADDTL MIN - UMMC: Performed by: UROLOGY

## 2017-10-18 PROCEDURE — 36592 COLLECT BLOOD FROM PICC: CPT | Performed by: UROLOGY

## 2017-10-18 PROCEDURE — 25500064 ZZH RX 255 OP 636: Performed by: UROLOGY

## 2017-10-18 PROCEDURE — 25000128 H RX IP 250 OP 636: Performed by: NURSE ANESTHETIST, CERTIFIED REGISTERED

## 2017-10-18 PROCEDURE — 25000131 ZZH RX MED GY IP 250 OP 636 PS 637: Mod: GY | Performed by: UROLOGY

## 2017-10-18 PROCEDURE — A9270 NON-COVERED ITEM OR SERVICE: HCPCS | Mod: GY | Performed by: UROLOGY

## 2017-10-18 PROCEDURE — C1769 GUIDE WIRE: HCPCS | Performed by: UROLOGY

## 2017-10-18 PROCEDURE — 37000008 ZZH ANESTHESIA TECHNICAL FEE, 1ST 30 MIN: Performed by: UROLOGY

## 2017-10-18 PROCEDURE — 27210794 ZZH OR GENERAL SUPPLY STERILE: Performed by: UROLOGY

## 2017-10-18 PROCEDURE — 0T778DZ DILATION OF LEFT URETER WITH INTRALUMINAL DEVICE, VIA NATURAL OR ARTIFICIAL OPENING ENDOSCOPIC: ICD-10-PCS | Performed by: UROLOGY

## 2017-10-18 PROCEDURE — 25000125 ZZHC RX 250: Performed by: UROLOGY

## 2017-10-18 DEVICE — STENT URETERAL FIRM 6FRX24CM W/O GW G23405
Type: IMPLANTABLE DEVICE | Site: URETER | Status: NON-FUNCTIONAL
Removed: 2017-11-09

## 2017-10-18 RX ORDER — SODIUM CHLORIDE, SODIUM LACTATE, POTASSIUM CHLORIDE, CALCIUM CHLORIDE 600; 310; 30; 20 MG/100ML; MG/100ML; MG/100ML; MG/100ML
INJECTION, SOLUTION INTRAVENOUS CONTINUOUS
Status: DISCONTINUED | OUTPATIENT
Start: 2017-10-18 | End: 2017-10-18 | Stop reason: HOSPADM

## 2017-10-18 RX ORDER — LIDOCAINE HYDROCHLORIDE 20 MG/ML
INJECTION, SOLUTION INFILTRATION; PERINEURAL PRN
Status: DISCONTINUED | OUTPATIENT
Start: 2017-10-18 | End: 2017-10-18

## 2017-10-18 RX ORDER — LIDOCAINE 40 MG/G
CREAM TOPICAL
Status: DISCONTINUED | OUTPATIENT
Start: 2017-10-18 | End: 2017-10-18 | Stop reason: HOSPADM

## 2017-10-18 RX ORDER — CEFAZOLIN SODIUM 2 G/100ML
2 INJECTION, SOLUTION INTRAVENOUS
Status: COMPLETED | OUTPATIENT
Start: 2017-10-18 | End: 2017-10-18

## 2017-10-18 RX ORDER — TAMSULOSIN HYDROCHLORIDE 0.4 MG/1
0.4 CAPSULE ORAL DAILY
Qty: 30 CAPSULE | Refills: 1 | Status: SHIPPED | OUTPATIENT
Start: 2017-10-18 | End: 2017-12-19

## 2017-10-18 RX ORDER — CEFAZOLIN SODIUM 1 G/3ML
1 INJECTION, POWDER, FOR SOLUTION INTRAMUSCULAR; INTRAVENOUS SEE ADMIN INSTRUCTIONS
Status: DISCONTINUED | OUTPATIENT
Start: 2017-10-18 | End: 2017-10-18 | Stop reason: HOSPADM

## 2017-10-18 RX ORDER — FENTANYL CITRATE 50 UG/ML
INJECTION, SOLUTION INTRAMUSCULAR; INTRAVENOUS PRN
Status: DISCONTINUED | OUTPATIENT
Start: 2017-10-18 | End: 2017-10-18

## 2017-10-18 RX ORDER — EPHEDRINE SULFATE 50 MG/ML
INJECTION, SOLUTION INTRAMUSCULAR; INTRAVENOUS; SUBCUTANEOUS PRN
Status: DISCONTINUED | OUTPATIENT
Start: 2017-10-18 | End: 2017-10-18

## 2017-10-18 RX ORDER — HYDROMORPHONE HYDROCHLORIDE 2 MG/1
2 TABLET ORAL EVERY 6 HOURS PRN
Qty: 15 TABLET | Refills: 0 | Status: ON HOLD | OUTPATIENT
Start: 2017-10-18 | End: 2017-11-09

## 2017-10-18 RX ORDER — PROPOFOL 10 MG/ML
INJECTION, EMULSION INTRAVENOUS PRN
Status: DISCONTINUED | OUTPATIENT
Start: 2017-10-18 | End: 2017-10-18

## 2017-10-18 RX ADMIN — ONDANSETRON 4 MG: 2 INJECTION INTRAMUSCULAR; INTRAVENOUS at 14:35

## 2017-10-18 RX ADMIN — LOPERAMIDE HYDROCHLORIDE 2 MG: 2 CAPSULE ORAL at 17:32

## 2017-10-18 RX ADMIN — FUROSEMIDE 20 MG: 20 TABLET ORAL at 07:52

## 2017-10-18 RX ADMIN — LIDOCAINE HYDROCHLORIDE 100 MG: 20 INJECTION, SOLUTION INFILTRATION; PERINEURAL at 13:58

## 2017-10-18 RX ADMIN — HYDROMORPHONE HYDROCHLORIDE 0.5 MG: 1 INJECTION, SOLUTION INTRAMUSCULAR; INTRAVENOUS; SUBCUTANEOUS at 06:47

## 2017-10-18 RX ADMIN — TACROLIMUS 3 MG: 1 CAPSULE ORAL at 07:50

## 2017-10-18 RX ADMIN — FENTANYL CITRATE 25 MCG: 50 INJECTION, SOLUTION INTRAMUSCULAR; INTRAVENOUS at 14:35

## 2017-10-18 RX ADMIN — TACROLIMUS 3 MG: 1 CAPSULE ORAL at 17:31

## 2017-10-18 RX ADMIN — HYDROMORPHONE HYDROCHLORIDE 2 MG: 2 TABLET ORAL at 17:35

## 2017-10-18 RX ADMIN — HYDROMORPHONE HYDROCHLORIDE 2 MG: 2 TABLET ORAL at 04:12

## 2017-10-18 RX ADMIN — PHENYLEPHRINE HYDROCHLORIDE 100 MCG: 10 INJECTION, SOLUTION INTRAMUSCULAR; INTRAVENOUS; SUBCUTANEOUS at 14:40

## 2017-10-18 RX ADMIN — SULFAMETHOXAZOLE AND TRIMETHOPRIM 1 TABLET: 400; 80 TABLET ORAL at 07:51

## 2017-10-18 RX ADMIN — ONDANSETRON 4 MG: 4 TABLET, ORALLY DISINTEGRATING ORAL at 18:01

## 2017-10-18 RX ADMIN — SODIUM CHLORIDE, POTASSIUM CHLORIDE, SODIUM LACTATE AND CALCIUM CHLORIDE: 600; 310; 30; 20 INJECTION, SOLUTION INTRAVENOUS at 13:40

## 2017-10-18 RX ADMIN — PREDNISONE 2.5 MG: 2.5 TABLET ORAL at 17:32

## 2017-10-18 RX ADMIN — SODIUM CHLORIDE: 9 INJECTION, SOLUTION INTRAVENOUS at 15:29

## 2017-10-18 RX ADMIN — PHENYLEPHRINE HYDROCHLORIDE 200 MCG: 10 INJECTION, SOLUTION INTRAMUSCULAR; INTRAVENOUS; SUBCUTANEOUS at 14:18

## 2017-10-18 RX ADMIN — SODIUM CHLORIDE: 9 INJECTION, SOLUTION INTRAVENOUS at 05:51

## 2017-10-18 RX ADMIN — CEFAZOLIN SODIUM 2 G: 2 INJECTION, SOLUTION INTRAVENOUS at 14:08

## 2017-10-18 RX ADMIN — PROCHLORPERAZINE EDISYLATE 10 MG: 5 INJECTION INTRAMUSCULAR; INTRAVENOUS at 05:51

## 2017-10-18 RX ADMIN — PREDNISONE 5 MG: 5 TABLET ORAL at 07:52

## 2017-10-18 RX ADMIN — OMEPRAZOLE 20 MG: 20 CAPSULE, DELAYED RELEASE ORAL at 17:32

## 2017-10-18 RX ADMIN — PROPOFOL 150 MG: 10 INJECTION, EMULSION INTRAVENOUS at 13:58

## 2017-10-18 RX ADMIN — PHENYLEPHRINE HYDROCHLORIDE 100 MCG: 10 INJECTION, SOLUTION INTRAMUSCULAR; INTRAVENOUS; SUBCUTANEOUS at 14:25

## 2017-10-18 RX ADMIN — METOPROLOL TARTRATE 25 MG: 25 TABLET ORAL at 07:52

## 2017-10-18 RX ADMIN — PHENYLEPHRINE HYDROCHLORIDE 200 MCG: 10 INJECTION, SOLUTION INTRAMUSCULAR; INTRAVENOUS; SUBCUTANEOUS at 14:08

## 2017-10-18 RX ADMIN — OMEPRAZOLE 20 MG: 20 CAPSULE, DELAYED RELEASE ORAL at 07:51

## 2017-10-18 RX ADMIN — MIDAZOLAM HYDROCHLORIDE 1 MG: 1 INJECTION, SOLUTION INTRAMUSCULAR; INTRAVENOUS at 13:46

## 2017-10-18 RX ADMIN — PHENYLEPHRINE HYDROCHLORIDE 100 MCG: 10 INJECTION, SOLUTION INTRAMUSCULAR; INTRAVENOUS; SUBCUTANEOUS at 14:31

## 2017-10-18 RX ADMIN — HYDROMORPHONE HYDROCHLORIDE 0.5 MG: 1 INJECTION, SOLUTION INTRAMUSCULAR; INTRAVENOUS; SUBCUTANEOUS at 04:23

## 2017-10-18 RX ADMIN — Medication 5 MG: at 14:25

## 2017-10-18 RX ADMIN — Medication 5 MG: at 14:40

## 2017-10-18 RX ADMIN — FENTANYL CITRATE 50 MCG: 50 INJECTION, SOLUTION INTRAMUSCULAR; INTRAVENOUS at 13:58

## 2017-10-18 RX ADMIN — PHENYLEPHRINE HYDROCHLORIDE 100 MCG: 10 INJECTION, SOLUTION INTRAMUSCULAR; INTRAVENOUS; SUBCUTANEOUS at 14:22

## 2017-10-18 RX ADMIN — FENTANYL CITRATE 25 MCG: 50 INJECTION, SOLUTION INTRAMUSCULAR; INTRAVENOUS at 14:28

## 2017-10-18 RX ADMIN — LOPERAMIDE HYDROCHLORIDE 2 MG: 2 CAPSULE ORAL at 07:53

## 2017-10-18 RX ADMIN — VALGANCICLOVIR 900 MG: 450 TABLET, FILM COATED ORAL at 07:51

## 2017-10-18 NOTE — DISCHARGE INSTRUCTIONS
WOC nurse  Left leg trauma wound slowly healing             Plan:     Nursing to notify the Provider(s) and re-consult the WOC Nurse if wound(s) deteriorate(s).    Plan of care for wound located on left lateral leg wound:  cleanse the wound with microklenz moisten gauze pat dry cut a piece of PolyMem roll dressing to fit the size of the wound and place over the wound and wrap with Kerlix change dressing daily and as needed if soiled

## 2017-10-18 NOTE — PLAN OF CARE
Problem: Patient Care Overview  Goal: Plan of Care/Patient Progress Review  Outcome: No Change     RN:  Afebrile, -170's/'s, HR 80-90's, MD on call notified, no intervention ordered, team will do rounds soon. Abdominal pain controlled with Dilaudid IV and PO, given x1, Compazine given for nausea. MIV NS@100cc/hr, patient NPO since midnight for Cystoscopy, retrograde pyelogram and left ureteral stent placement. Good urine output, no BM during the shift. Up ad aviva in the room, will continue to monitor.

## 2017-10-18 NOTE — PROGRESS NOTES
[facility] - [facility] Nurse Inpatient Wound Assessment     Initial Assessment of wound(s) on pt's:   Left lateral leg wound         Data:   Patient History:        per MD note(s): Aubrey Duncan is a 62 year old male with PMH of immunosuppression with lung transplant in  2/2 alpha-1-antitrypsin deficiency, recent admission (discharged 10/11/17) for a non-healing leg wound and no previous urology history who is transferred from [place] with 1.2 cm left proximal ureteral stone. Endorsed left flank pain radiating to groin for the past 3 days. Denies fever/chills, no vomiting. Denies gross hematuria or dysuria.  Pain currently well controlled.  [place] collected yet.  Afebrile no white count      \  [facility] DATA 10/18/2017      WO nurse here for an initial assessment of the left leg wound. Patient, who is alert and oriented x 4 stated his wounds started in August of this year from trauma while working in his garage, he accidentally had a board fall on his leg resulting in an injury. He reports that the pain has significantly improve. He was seen by another [facility] nurse during his last hospital stay early this month and was also seen by the [facility] nurse in the clinic at which time PolyMem roll was reccommended for his wound care dressing. He stated that he changes his dressing at home daily. He endorses pain with manipulation of the wound, but otherwise stable. The wound is clean, well granulated. The wound was cleansed and redressed using the PolyMem dressing. [facility] will continue with the current plan of care due to improvements made to the wound from reviewing previous pictures taken by patient and his wife.     Moisture Management:  Wound care dressing     Current Diet / Nutrition:           Active Diet Order      NPO per Anesthesia Guidelines for Procedure/Surgery Except for: Meds           Stevenson Assessment and sub scores:   Stevenson Score  Av.9  Min: 12  Max: 23     Labs:       Recent Labs   Lab Test  10/18/17   0638  10/17/17   1532   10/07/17   0724   02/08/16   1007   10/06/14   1107   ALBUMIN   --    --    --    --    --   3.6   < >  3.2*   HGB  12.2*  12.5*   < >  12.0*   < >  12.5*   < >  11.0*   RBC  3.80*  3.77*   < >  3.68*   < >  3.85*   < >  3.70*   WBC  2.5*  2.2*   < >  3.1*   < >  2.5*   < >  3.8*   PLT  126*  119*   < >  158   < >  166   < >  185   INR   --   1.14   --    --    --    --    < >  1.07   A1C   --    --    --    --    --    --    --   6.5*   CRP   --    --    --   <2.9   < >   --    --    --     < > = values in this interval not displayed.        Wound Assessment (location #1Left leg ):   Wound History:  Trauma wound                 Wound Base:  moist and granulation    Specific Dimensions (length x width x depth, in cm) :   4 cm x 3 cm x 0.3 cm     Tunneling:  N/A    Undermining: N/A    Palpation of the wound bed:  normal    Slough appearance:  none    Eschar appearance:  none    Periwound Skin: intact,      Color: normal and consistent with surrounding tissue    Temperature  normal     Drainage: . Amount: small . Color: serosanguinous     Odor: none    Pain:  minimal , aching          Intervention:     Patient's chart evaluated.      Wound(s) was fully assessed    Wound Care: was done:      Orders  Written    Supplies  Ordered: Polymem    Discussed plan of care with Patient and wife           Assessment:   Left leg trauma wound slowly healing           Plan:     Nursing to notify the Provider(s) and re-consult the WOC Nurse if wound(s) deteriorate(s).    Plan of care for wound located on left lateral leg wound:  cleanse the wound with microklenz moisten gauze pat dry cut a piece of PolyMem roll dressing to fit the size of the wound and place over the wound and wrap with Kerlix change dressing daily and as needed if soiled         WOC Nurse will return:Weekly     Face to face time: 30 minutes

## 2017-10-18 NOTE — ANESTHESIA PREPROCEDURE EVALUATION
Anesthesia Evaluation     . Pt has had prior anesthetic. Type: General    No history of anesthetic complications          ROS/MED HX    ENT/Pulmonary:     (+), . Other pulmonary disease alpha 1 antitrypsin disease s/t bilateral lung transplant.    Neurologic:       Cardiovascular:     (+) hypertension----. : . . . :. .       METS/Exercise Tolerance:     Hematologic:         Musculoskeletal:         GI/Hepatic:     (+) liver disease,       Renal/Genitourinary:         Endo:         Psychiatric:         Infectious Disease:         Malignancy:         Other:                     Physical Exam  Normal systems: cardiovascular, pulmonary and dental    Airway   Mallampati: II  TM distance: >3 FB  Neck ROM: full    Dental     Cardiovascular       Pulmonary                     Anesthesia Plan      History & Physical Review  History and physical reviewed and following examination; no interval change.    ASA Status:  3 .    NPO Status:  > 8 hours    Plan for General and LMA with Intravenous induction. Maintenance will be Balanced.    PONV prophylaxis:  Ondansetron (or other 5HT-3)       Postoperative Care  Postoperative pain management:  IV analgesics.      Consents  Anesthetic plan, risks, benefits and alternatives discussed with:  Patient.  Use of blood products discussed: No .   .

## 2017-10-18 NOTE — PROGRESS NOTES
Pulmonary Medicine  Cystic Fibrosis - Lung Transplant Daily Progress Note   October 18, 2017       Patient: Aubrey Duncan  MRN: [id number]  Transplant Date: 9/8/2013 (POD# 1501)  Admission date: 10/17/2017  Hospital Day #1          Assessment and Plan:     Aubrey Duncan is a 62 year old male with a PMH significant for bilateral sequential lung transplant in 2013 for alpha-1-antitrypsin deficiency, remote h/o CMV viremia, chronic diarrhea, chronic leukopenia, and hypertension who was admitted on 10/17 from a TCU in [place], [place], for evaluation of left flank pain.  Found to have a left proximal ureteral stone and is currently planned for a cystoscopy, retrograde, pyelogram, and left ureteral stent by urology today.        H/o A1AT deficiency s/p BSLT (2013):  No acute pulmonary symptoms or complaints.  Endorses chronic cough with no active signs/symptoms of decompensation or infection.  Followed by Dr. Broussard, last seen 4/19 and noted to have slight decrease in recent PFTs but stable pulmonary symptoms.   - Previously scheduled for outpatient f/u with Dr. Broussard on 10/19, will reschedule for a few weeks from now.     Immunosuppression:  - Tacrolimus 3 mg BID. Target goal 10-12.  Tacro level today 10.4, no change in dose.  Should be rechecked at next outpatient f/u appointment.  - Azathioprine 25 mg qHS  - Prednisone 5 mg qAM / 2.5 mg PM     Prophylaxis:  CMV: Remote h/o CMV viremia in 2013, 2014. Continue valganciclovir (has been on current dose since 2014).  PJP: Continue Bactrim qMWF     Leukopenia: Chronic. WBC close to baseline ~ 2.0-3.5k. Likely 2/2 to bone marrow suppressing medications. No intervention needed at this time.      We appreciate the excellent care provided by the Urology team. We will continue to follow along closely, please do not hesitate to call with any questions or concerns.      Patient discussed with Dr. Vogel.    Lola Martinez, DNP, APRN, CNP  Inpatient Nurse Practitioner  Pulmonary  Firm  Pager [phone]  Page attending on service 5-6 pm  After 6 pm weekdays and all day weekends: pager [phone]        Subjective & Interval History:     Last 24 hours of care team notes reviewed.    No acute pulmonary complaints, chronic cough unchanged, no hypoxia.  Has been NPO for urology procedure tonight.  Some nausea, managed with antiemetics.  Some pain, tolerable.           Review of Systems:     C: no fever, no chills, no change in weight, no change in appetite  INTEGUMENTARY/SKIN: + left shin wound  ENT/MOUTH: no sore throat, no sinus pain, no nasal drainage  RESP: see interval history  CV: no chest pain, no palpitations, no peripheral edema, no orthopnea  GI: + occ nausea, no vomiting, no change in stools, no reflux symptoms, + abdominal pain  : no dysuria, no gross hematuria, no frequency, no urgency, + left flank pain  MUSCULOSKELETAL: no myalgias, no arthralgias  ENDOCRINE: blood sugars with adequate control  NEURO: no headache, no numbness or tingling  PSYCHIATRIC: mood stable          Medications:     Active Medications:    ceFAZolin  1 g Intravenous See Admin Instructions     sodium chloride (PF)  3 mL Intracatheter Q8H     [Auto Hold] azaTHIOprine  25 mg Oral Daily     [Auto Hold] furosemide  20 mg Oral Daily     [Auto Hold] lisinopril (PRINIVIL/ZESTRIL) tablet 5 mg  5 mg Oral Daily     [Auto Hold] metoprolol  25 mg Oral BID     [Auto Hold] omeprazole  20 mg Oral BID AC     [Auto Hold] tacrolimus  3 mg Oral two times daily     [Auto Hold] sodium chloride (PF)  3 mL Intracatheter Q8H     [Auto Hold] loperamide  2 mg Oral 4x Daily     [Auto Hold] predniSONE  5 mg Oral Daily     [Auto Hold] predniSONE  2.5 mg Oral QPM     [Auto Hold] valGANciclovir  900 mg Oral Daily     [Auto Hold] sulfamethoxazole-trimethoprim  1 tablet Oral Q Mon Wed Fri AM     Active PRN Medications:  lidocaine 4%, sodium chloride (PF), lidocaine (buffered or not buffered), lidocaine 4%, sodium chloride (PF), iothalamate,  "[Auto Hold] albuterol, [Auto Hold] HYDROmorphone, [Auto Hold] naloxone, [Auto Hold] acetaminophen, [Auto Hold] HYDROmorphone, [Auto Hold] ondansetron **OR** [Auto Hold] ondansetron, [Auto Hold] prochlorperazine **OR** [Auto Hold] prochlorperazine **OR** [Auto Hold] prochlorperazine, [Auto Hold] alum & mag hydroxide-simethicone         Physical Exam:     Constitutional: Awake, alert, in no apparent distress.   HEENT: Eyes with pink conjunctivae, no scleral jaundice.  Oral mucosa moist without lesions. Neck supple without lymphadenopathy.   PULM: Good air flow bilaterally.  No crackles, no rhonchi, no wheezes.   CV: Normal S1 and S2. RRR.  No murmur, gallop, or rub.  No peripheral edema.  Peripheral pulses intact.   ABD: NABS, soft, nontender, nondistended.  No guarding.   MSK: Moves all extremities.  No apparent muscle wasting.   NEURO: Alert and oriented x 4, conversant.   SKIN: Warm, dry. No pruritus.  No rash on limited exam. Left shin wound covered, dressing cdi.  PSYCH: Mood stable, judgment and insight appropriate.?     Lines, Drains, and Devices:  PICC Double Lumen 10/05/17 Left Brachial vein medial (Active)   Site Assessment WDL 10/18/2017 10:40 AM   External Cath Length (cm) 3 cm 10/2/2017 12:00 AM   Extremity Circumference (cm) 26 cm 10/2/2017 12:00 AM   Dressing Intervention Chlorhexidine patch;Securing device;Transparent;New dressing 10/2/2017 12:00 AM   Extravasation? No 10/8/2017  5:00 PM   Dressing Change Due 10/12/17 10/5/2017  3:00 PM   PICC Comment C/D/I 10/9/2017  1:00 AM   PICC Lumen Assessment Trigger Clark;Purple 10/17/2017 11:00 AM   Number of days:13          Data:     All vital signs, laboratory and imaging data for the past 24 hours reviewed.      Vital signs:  Temp: 99.2  F (37.3  C) Temp src: Oral BP: 145/89 Pulse: 84 Heart Rate: 83 Resp: 14 SpO2: 97 % O2 Device: None (Room air)   Height: 172.7 cm (5' 8\") Weight: 74.7 kg (164 lb 11.2 oz)    Weight trend:   Vitals:    10/17/17 1023 10/18/17 " 1019   Weight: 71.5 kg (157 lb 9.6 oz) 74.7 kg (164 lb 11.2 oz)        I/O:   Intake/Output Summary (Last 24 hours) at 10/18/17 1433  Last data filed at 10/18/17 1400   Gross per 24 hour   Intake             2515 ml   Output             1350 ml   Net             1165 ml       Labs    CMP:   Recent Labs  Lab 10/18/17  0638 10/17/17  1107    138   POTASSIUM 4.1 3.9   CHLORIDE 105 106   CO2 22 22   ANIONGAP 10 10   * 183*   BUN 19 22   CR 1.35* 1.26*   GFRESTIMATED 53* 58*   GFRESTBLACK 64 70   ERIN 8.3* 8.0*       CBC:   Recent Labs  Lab 10/18/17  0638 10/17/17  1532 10/17/17  1107   WBC 2.5* 2.2* 1.9*   RBC 3.80* 3.77* 3.76*   HGB 12.2* 12.5* 12.6*   HCT 38.6* 37.0* 37.8*   * 98 101*   MCH 32.1 33.2* 33.5*   MCHC 31.6 33.8 33.3   RDW 13.8 13.5 13.6   * 119* 8*       INR:   Recent Labs  Lab 10/17/17  1532   INR 1.14       Glucose:   Recent Labs  Lab 10/18/17  1104 10/18/17  0638 10/17/17  1107   GLC  --  158* 183*   *  --   --        Blood Gas: No lab results found in last 7 days.    Culture Data:   Recent Labs  Lab 10/17/17  1330   CULT No growth       Virology Data: Lab Results   Component Value Date    INFLUA Negative 01/28/2014    FLUAH1 Negative 08/05/2014    FLUAH3 Negative 08/05/2014    [id number] Negative 08/05/2014    IFLUB Negative 08/05/2014    RSVA Negative 08/05/2014    RSVB Negative 08/05/2014    PIV1 Negative 08/05/2014    PIV2 Negative 08/05/2014    PIV3 Negative 08/05/2014    HMPV Negative 08/05/2014    HRVS Negative 08/05/2014    ADVBE Negative 08/05/2014    ADVC Negative 08/05/2014    ADVC Negative 06/03/2014    ADVC Negative 04/08/2014       Historical CMV results (last 3 of prior testing):  Lab Results   Component Value Date    CMVQNT  04/19/2017     CMV DNA Not Detected   The EMERALD AmpliPrep/EMERALD TaqMan CMV Test is an FDA-approved in vitro nucleic   acid amplification test for the quantitation of cytomegalovirus DNA in human   plasma (EDTA plasma) using the EMERALD  AmpliPrep Instrument for automated viral   nucleic acid extraction and the EMERALD TaqMan Analyzer or EMERALD TaqMan for   automated Real Time amplification and detection of the viral nucleic acid   target.   Titer results are reported in International Units/mL (IU/mL using 1st WHO   International standard for Human Cytomegalovirus for Nucleic Acid Amplification   based assays. The conversion factor between CMV DNA copis/mL (as defined by the   Roche EMERALD TaqMan CMV test) and International Units is the CMV DNA   concentration in IU/mL x 1.1 copies/IU = CMV DNA in copies/mL.   This assay has received FDA approval for the testing of human plasma only. The   [facility] at the [facility], has validated the performance characteristics of the Roche   CMV assay for plasma, bronchial alveolar lavage/wash and urine.      CMVQNT  10/19/2016     CMV DNA Not Detected   The EMERALD AmpliPrep/EMERALD TaqMan CMV Test is an FDA-approved in vitro nucleic   acid amplification test for the quantitation of cytomegalovirus DNA in human   plasma (EDTA plasma) using the EMERALD AmpliPrep Instrument for automated viral   nucleic acid extraction and the EMERALD TaqMan Analyzer or EMERALD TaqMan for   automated Real Time amplification and detection of the viral nucleic acid   target.   Titer results are reported in International Units/mL (IU/mL using 1st WHO   International standard for Human Cytomegalovirus for Nucleic Acid Amplification   based assays. The conversion factor between CMV DNA copis/mL (as defined by the   Roche EMERALD TaqMan CMV test) and International Units is the CMV DNA   concentration in IU/mL x 1.1 copies/IU = CMV DNA in copies/mL.   This assay has received FDA approval for the testing of human plasma only. The   [facility] the [facility], has validated the performance characteristics of the  Roche   CMV assay for plasma, bronchial alveolar lavage/wash and urine.      CMVQNT  06/14/2016     CMV DNA Not Detected   The EMERALD AmpliPrep/EMERALD TaqMan CMV Test is an FDA-approved in vitro nucleic   acid amplification test for the quantitation of cytomegalovirus DNA in human   plasma (EDTA plasma) using the EMERALD AmpliPrep Instrument for automated viral   nucleic acid extraction and the EMERALD TaqMan Analyzer or EMERALD TaqMan for   automated Real Time amplification and detection of the viral nucleic acid   target.   Titer results are reported in International Units/mL (IU/mL using 1st WHO   International standard for Human Cytomegalovirus for Nucleic Acid Amplification   based assays. The conversion factor between CMV DNA copis/mL (as defined by the   Roche EMERALD TaqMan CMV test) and International Units is the CMV DNA   concentration in IU/mL x 1.1 copies/IU = CMV DNA in copies/mL.   This assay has received FDA approval for the testing of human plasma only. The   [facility]fect[facility] Disease Diagnostic Laboratory at the [facility], [place], has validated the performance characteristics of the Roche   CMV assay for plasma, bronchial alveolar lavage/wash and urine.       Lab Results   Component Value Date    CMVLOG Not Calculated 04/19/2017    CMVLOG Not Calculated 10/19/2016    CMVLOG Not Calculated 06/14/2016       Urine Studies  Recent Labs   Lab Test  10/17/17   1330   URINEPH  5.5   NITRITE  Negative   LEUKEST  Negative   WBCU  15*

## 2017-10-18 NOTE — PROGRESS NOTES
"Urology Daily Progress Note    24 hour events/Subjective:     - No acute events overnight   - Pain moderately controlled on current regimen   - Remains NPO ; no nausea or vomiting   - Has not ambulated this AM     O:  Vitals: BP (!) 165/95 (BP Location: Right arm)  Pulse 89  Temp 98.5  F (36.9  C) (Oral)  Resp 20  Ht 1.727 m (5' 8\")  Wt 71.5 kg (157 lb 9.6 oz)  SpO2 95%  BMI 23.96 kg/m2  General: Alert, interactive, in NAD  Resp: Non-labored breathing on RA  Abdomen: Soft, non-tender, non distended.   Ext: Warm and well perfused       I/O last 3 completed shifts:  In: 1335 [P.O.:240; I.V.:1095]  Out: 350 [Urine:350] - Last 24 hours    Labs/Imaging  Heme:  Recent Labs  Lab 10/17/17  1532 10/17/17  1107   WBC 2.2* 1.9*   HGB 12.5* 12.6*   * 8*     Chem:  Recent Labs  Lab 10/17/17  1107   POTASSIUM 3.9   CR 1.26*       Assessment/Plan  62 year old y/o male who is to undergo left stent placement for left obstructing kidney stone. To undergo left stent placement this AM with Dr. Jimenez.     NEURO Pain moderately controlled on current regimen. Changes:  None    CV HDS. Hypertensive this AM, patient on home meds. Will continue to monitor.    PULM Aggressive pulmonary toilet and I/S.   FEN/GI mIVF @ 100 ml/hr. Continue NPO     No acute issues, good UOP    HEME Hgb as above, postop anemia expected for this operation.  Continue to monitor. No transfusions indicated at this time   ID Afebrile, no leukocytosis.    Antibiotics: Pre-operatively antibiotics ordered     ENDO No issues   ACTIVITY Up as tolerated  Ambulate at least TID    PPx SCDs, ambulation   DISPO Home, likely today if clinically stable following procedure         Seen and examined with chief resident, to be discussed with Dr. Jimenez    --    Robbin Macario MD  Urology Resident           Contacting the Urology Team     Please use the following job codes to reach the Urology Team. Note that you must use an in house phone and that job codes " cannot receive text pages.     On weekdays, dial [phone] (or star-star-star [phone] on the new [employer] telephones) then [phone] to reach the Adult Urology resident or PA on call    On weekdays, dial [phone] (or star-star-star [phone] on the new [employer] telephones) then [phone] to reach the Pediatric Urology resident    On weeknights and weekends, dial [phone] (or star-star-star [phone] on the new [employer] telephones) then [phone] to reach the Urology resident on call (for both Adult and Pediatrics)

## 2017-10-18 NOTE — DISCHARGE SUMMARY
Discharge Summary     Aubrey Duncan MRN# [id number]   YOB: 1955 Age: 62 year old     Date of Admission:  10/17/2017  Date of Discharge::  10/18/2017  Admitting Physician:  Darwin Jimenez MD  Discharge Physician:  Dimitri Rodriguez MD  Primary Care Physician:         David Jiang          Admission Diagnoses:   Kidney Stone   ICA stenosis   Renal depression  Ureteral stone  LEFT URETERAL STONE          Discharge Diagnosis:   Same as above         Procedures:   10/18/17: Procedure(s):  Cystoscopy, Retrograde Pyelogram, Ureteral Stent Placement  - Wound Class: II-Clean Contaminated        Non-operative procedures:   None performed          Consultations:   PULMONARY CF/TRANSPLANT ADULT IP CONSULT  WOUND OSTOMY CONTINENCE NURSE  IP CONSULT         Imaging Studies:     Results for orders placed or performed during the hospital encounter of 10/17/17   XR Surgery CARLA Fluoro L/T 5 Min w Stills    Narrative    This exam was marked as non-reportable because it will not be read by a   radiologist or a [facility] non-radiologist provider.                      Medications Prior to Admission:     Prescriptions Prior to Admission   Medication Sig Dispense Refill Last Dose     LISINOPRIL PO Take 5 mg by mouth daily   10/16/2017 at 1800     sulfamethoxazole-trimethoprim (BACTRIM/SEPTRA) 400-80 MG per tablet TAKE ONE TABLET BY MOUTH THREE TIMES A WEEK 12 tablet 11 10/16/2017 at Unknown time     predniSONE (DELTASONE) 5 MG tablet TAKE ONE TABLET BY MOUTH EVERY MORNING AND TAKE ONE-HALF TABLET BY MOUTH EVERY EVENING 45 tablet 12 10/17/2017 at 0800     azaTHIOprine (IMURAN) 50 MG tablet Take 0.5 tablets (25 mg) by mouth daily 15 tablet 11 10/16/2017 at Unknown time     tacrolimus (PROGRAF - GENERIC EQUIVALENT) 1 MG capsule Take 3 caps every am and 3 mg every pm. (Patient taking differently: Take 3 caps every am and 3 caps every pm.) 180 capsule 12 10/17/2017 at 0800      metoprolol (LOPRESSOR) 25 MG tablet TAKE ONE TABLET BY MOUTH TWICE A DAY 60 tablet 12 10/17/2017 at 0800     furosemide (LASIX) 20 MG tablet TAKE ONE TABLET BY MOUTH EVERY DAY 30 tablet 11 10/17/2017 at 0800     valGANciclovir (VALCYTE) 450 MG tablet TAKE ONE TABLETS (450MG) BY MOUTH TWO TIMES A DAY 60 tablet 11 10/17/2017 at 0800     loperamide (IMODIUM) 2 MG capsule Take 1 capsule (2 mg) by mouth 4 times daily as needed for diarrhea 120 capsule 12 10/17/2017 at 0800     omeprazole (PRILOSEC) 20 MG capsule Take 1 capsule (20 mg) by mouth 2 times daily (before meals) 60 capsule 12 10/17/2017 at 0800     calcium-vitamin D (CALTRATE) 600-400 MG-UNIT per tablet Take 1 tablet by mouth 2 times daily (with meals) 60 tablet 12 10/17/2017 at 0800     multivitamin, therapeutic (THERA-VIT) TABS Take 1 tablet by mouth daily 30 tablet 12 10/17/2017 at 0800     alendronate (FOSAMAX) 70 MG tablet Take 1 tablet (70 mg) by mouth every 7 days Take with 8 ounces water, at least 60 min before breakfast, and stay upright for at least 30 min after dose. 4 tablet 11 10/11/2017     [DISCONTINUED] piperacillin-tazobactam (ZOSYN) 3-0.375 GM vial Inject 3.375 g into the vein every 6 hours for 7 days 40 each       order for DME Equipment being ordered: Polymem Non-Adhesive Pad  Treatment Diagnosis: Wound infection 30 pad 1      albuterol (PROAIR HFA, PROVENTIL HFA, VENTOLIN HFA) 108 (90 BASE) MCG/ACT inhaler Inhale 2 puffs into the lungs every 6 hours as needed for shortness of breath / dyspnea or wheezing 3 Inhaler 11 More than a month at Unknown time     sildenafil (VIAGRA) 25 MG tablet Take 1 tablet (25 mg) by mouth as needed for erectile dysfunction 30 tablet 0 Unknown at Unknown time            Discharge Medications:     Current Discharge Medication List      START taking these medications    Details   tamsulosin (FLOMAX) 0.4 MG capsule Take 1 capsule (0.4 mg) by mouth daily  Qty: 30 capsule, Refills: 1    Associated Diagnoses: Ureteral  stone      phenazopyridine (AZO) 97.5 MG tablet Take 2 tablets (195 mg) by mouth 3 times daily  Qty: 12 tablet, Refills: 0    Associated Diagnoses: Ureteral stone         CONTINUE these medications which have CHANGED    Details   HYDROmorphone (DILAUDID) 2 MG tablet Take 1 tablet (2 mg) by mouth every 6 hours as needed for moderate to severe pain  Qty: 15 tablet, Refills: 0    Associated Diagnoses: Wound infection         CONTINUE these medications which have NOT CHANGED    Details   LISINOPRIL PO Take 5 mg by mouth daily      sulfamethoxazole-trimethoprim (BACTRIM/SEPTRA) 400-80 MG per tablet TAKE ONE TABLET BY MOUTH THREE TIMES A WEEK  Qty: 12 tablet, Refills: 11    Associated Diagnoses: Lung replaced by transplant (H)      predniSONE (DELTASONE) 5 MG tablet TAKE ONE TABLET BY MOUTH EVERY MORNING AND TAKE ONE-HALF TABLET BY MOUTH EVERY EVENING  Qty: 45 tablet, Refills: 12    Associated Diagnoses: Lung transplant status, bilateral (H)      azaTHIOprine (IMURAN) 50 MG tablet Take 0.5 tablets (25 mg) by mouth daily  Qty: 15 tablet, Refills: 11    Associated Diagnoses: Lung replaced by transplant (H)      tacrolimus (PROGRAF - GENERIC EQUIVALENT) 1 MG capsule Take 3 caps every am and 3 mg every pm.  Qty: 180 capsule, Refills: 12    Comments: Dosing change.  Associated Diagnoses: Lung transplant status, bilateral (H)      metoprolol (LOPRESSOR) 25 MG tablet TAKE ONE TABLET BY MOUTH TWICE A DAY  Qty: 60 tablet, Refills: 12    Associated Diagnoses: Hypertension      furosemide (LASIX) 20 MG tablet TAKE ONE TABLET BY MOUTH EVERY DAY  Qty: 30 tablet, Refills: 11    Associated Diagnoses: Hypertension      valGANciclovir (VALCYTE) 450 MG tablet TAKE ONE TABLETS (450MG) BY MOUTH TWO TIMES A DAY  Qty: 60 tablet, Refills: 11    Associated Diagnoses: CMV (cytomegalovirus infection) (H); Lung replaced by transplant (H)      loperamide (IMODIUM) 2 MG capsule Take 1 capsule (2 mg) by mouth 4 times daily as needed for diarrhea  Qty:  120 capsule, Refills: 12    Associated Diagnoses: Lung transplant status, bilateral (H)      omeprazole (PRILOSEC) 20 MG capsule Take 1 capsule (20 mg) by mouth 2 times daily (before meals)  Qty: 60 capsule, Refills: 12    Associated Diagnoses: Lung transplant status, bilateral (H)      calcium-vitamin D (CALTRATE) 600-400 MG-UNIT per tablet Take 1 tablet by mouth 2 times daily (with meals)  Qty: 60 tablet, Refills: 12    Associated Diagnoses: Lung transplant status, bilateral (H)      multivitamin, therapeutic (THERA-VIT) TABS Take 1 tablet by mouth daily  Qty: 30 tablet, Refills: 12    Associated Diagnoses: Lung transplant status, bilateral (H)      alendronate (FOSAMAX) 70 MG tablet Take 1 tablet (70 mg) by mouth every 7 days Take with 8 ounces water, at least 60 min before breakfast, and stay upright for at least 30 min after dose.  Qty: 4 tablet, Refills: 11    Associated Diagnoses: Osteopenia      order for DME Equipment being ordered: Polymem Non-Adhesive Pad  Treatment Diagnosis: Wound infection  Qty: 30 pad, Refills: 1    Associated Diagnoses: Wound infection      albuterol (PROAIR HFA, PROVENTIL HFA, VENTOLIN HFA) 108 (90 BASE) MCG/ACT inhaler Inhale 2 puffs into the lungs every 6 hours as needed for shortness of breath / dyspnea or wheezing  Qty: 3 Inhaler, Refills: 11    Associated Diagnoses: Wheezing      sildenafil (VIAGRA) 25 MG tablet Take 1 tablet (25 mg) by mouth as needed for erectile dysfunction  Qty: 30 tablet, Refills: 0    Associated Diagnoses: ED (erectile dysfunction)         STOP taking these medications       piperacillin-tazobactam (ZOSYN) 3-0.375 GM vial Comments:   Reason for Stopping:                      Brief History of Illness:   From H&P on 10/17/17:  Aubrey Duncan is a 62 year old male with PMH of immunosuppression with lung transplant in 2013 2/2 alpha-1-antitrypsin deficiency, recent admission (discharged 10/11/17) for a non-healing leg wound and no previous urology history who  "is transferred from [place] with 1.2 cm left proximal ureteral stone. Endorsed left flank pain radiating to groin for the past 3 days. Denies fever/chills, no vomiting. Denies gross hematuria or dysuria.  Pain currently well controlled.  [place] yet.  Afebrile no white count.             Hospital Course:   The patient's hospital course was unremarkable.  Aubrey Duncan recovered as anticipated and experienced no post-operative complications.      On POD#0 (HD#2) patient was ambulating without assitance, tolerating the discharge diet, had pain controlled with PO medications to go home with, and requiring no IV medications or fluids. Patient was discharged home with appropriate contact information, follow-up and instructions as seen below in the discharge paperwork.       Final Pathology Result:   Urine culture pending         Discharge Instructions and Follow-Up:     Discharge Procedure Orders  Reason for your hospital stay   Order Comments: You were in the hospital for kidney stone     [facility] Follow-up and recommended labs and tests   Order Comments: Follow-up:  -Return to clinic to see Dr. Lyn as instructed.  Contact our clinic using the numbers below if you have questions about this.    Phone numbers:   - Monday through Friday 8am to 4:30pm: Call [phone] with questions or to schedule or confirm appointment.    - Nights or weekends: call the after hours emergency pager - [phone] and tell the  \"I would like to page the Urology Resident on call.\"  - For emergencies, call [phone]     Activity   Order Comments: Activity  - No strenuous exercise for 6 weeks.  - No lifting, pushing, pulling more than 10 pounds for 6 weeks.   - Do not strain with bowel movements.  - Do not drive until you can press the brake pedal quickly and fully without pain.   - Do not operate a motor vehicle while taking narcotic pain medications (if prescribed)   Order Specific " Question Answer Comments   Is discharge order? Yes      When to contact your care team   Order Comments: - Call or return sooner than your regularly scheduled visit if you develop any of the following: fever (greater than 101.5), uncontrolled pain, uncontrolled nausea or vomiting, as well as increased redness, swelling, or drainage from your wound (if present).  Call or return to ER if you have blood in urine that is getting worse (rather than getting better) or if you are passing clots and unable to urinate     Discharge Instructions   Order Comments: Medications  - Take antibiotics as prescribed (if prescribed)  - Transition from narcotic pain medications to tylenol (acetaminophen) as you are able.  Wean yourself off all pain medications as you are able.  - Some pain medications contain both tylenol (acetaminophen) and a narcotic (Norco, vicodin, percocet), do not take more than 4,000mg of Tylenol (acetaminophen) from all sources in any 24 hour period.  - Narcotics can make you constipated.  Take over the counter fiber (metamucil or benefiber) and stool softeners (miralax, docusate or senna) while taking narcotic pain medications, but stop if you develop diarrhea.  - No driving or operating machinery while taking narcotic pain medications     Full Code     Diet   Order Comments: Resume previous diet upon discharge   Order Specific Question Answer Comments   Is discharge order? Yes               Discharge Disposition:   Discharged to Home      Condition at discharge: Good    --    Dimitri Rodriguez MD  Urology Resident    5:46 PM, 10/18/2017

## 2017-10-18 NOTE — ANESTHESIA CARE TRANSFER NOTE
Patient: Aubrey Duncan    Procedure(s):  Cystoscopy, Retrograde Pyelogram, Ureteral Stent Placement  - Wound Class: II-Clean Contaminated    Diagnosis: LEFT URETERAL STONE  Diagnosis Additional Information: No value filed.    Anesthesia Type:   General, LMA     Note:  Airway :Face Mask  Patient transferred to:PACU  Comments: Pt alert, breathing spontaneously on 8L O2 via FM. VSS. Report shared with RN.Handoff Report: Identifed the Patient, Identified the Reponsible Provider, Reviewed the pertinent medical history, Discussed the surgical course, Reviewed Intra-OP anesthesia mangement and issues during anesthesia, Set expectations for post-procedure period and Allowed opportunity for questions and acknowledgement of understanding      Vitals: (Last set prior to Anesthesia Care Transfer)    CRNA VITALS  10/18/2017 1411 - 10/18/2017 1448      10/18/2017             NIBP: 144/85    Pulse: 80    NIBP Mean: 101                Electronically Signed By: BRANDI Deleon CRNA  October 18, 2017  2:48 PM

## 2017-10-18 NOTE — ANESTHESIA POSTPROCEDURE EVALUATION
Patient: Aubrey Duncan    Procedure(s):  Cystoscopy, Retrograde Pyelogram, Ureteral Stent Placement  - Wound Class: II-Clean Contaminated    Diagnosis:LEFT URETERAL STONE  Diagnosis Additional Information: No value filed.    Anesthesia Type:  General, LMA    Note:  Anesthesia Post Evaluation    Patient location during evaluation: PACU  Patient participation: Able to fully participate in evaluation  Level of consciousness: awake and alert  Pain management: satisfactory to patient  Airway patency: patent  Cardiovascular status: blood pressure returned to baseline and hemodynamically stable  Respiratory status: room air  Hydration status: euvolemic  PONV: controlled     Anesthetic complications: None          Last vitals:  Vitals:    10/18/17 1030 10/18/17 1445 10/18/17 1500   BP: 145/89     Pulse:      Resp: 14 14 19   Temp: 37.3  C (99.2  F) 36.9  C (98.5  F)    SpO2: 97%           Electronically Signed By: Sandoval Lovell MD  October 18, 2017  3:15 PM

## 2017-10-18 NOTE — PROGRESS NOTES
At 0130, Urology on call paged about patient's pre-op orders, per MD on call no pre-op orders needed.

## 2017-10-18 NOTE — PLAN OF CARE
Problem: Patient Care Overview  Goal: Individualization & Mutuality  Outcome: Improving  Aubrey has been afebrile w stable VS this shift on room air. Denies pain and nausea. NPO for surgery. Went down to OR around 1030, finally went into surgery around 1400 (per wife). PICC line saline locked when he went down. Will continue to monitor and notify team w concerns.

## 2017-10-18 NOTE — PLAN OF CARE
Problem: Patient Care Overview  Goal: Plan of Care/Patient Progress Review  Outcome: Adequate for Discharge Date Met:  10/18/17  [id number]: Pt left unit around 1830. VSS on RA. Pt complaining of some abdominal discomfort, PRN Dilaudid given x1. Pt ate some Jello following surgery, pt denied nausea but PRN Zofran given x1 for patients car ride home. Pt was given scheduled medications before leaving the unit including Omeprazole, Tacrolimus, Prednisone, and Imodium. Pt instructed to take Lisinopril and Metoprolol later on this evening. PICC pulled per MD order. Medications picked up by wife from discharge pharmacy and sent home with the patient. Discharge paperwork discussed with the patient and his wife, questions answered and encouraged. All belongings sent home with the patient. Pt left via wheelchair and will be escorted back home by his wife.

## 2017-10-18 NOTE — OP NOTE
OPERATIVE REPORT    PREOPERATIVE DIAGNOSIS: Left ureteral stone    POSTOPERATIVE DIAGNOSIS:  Same    PROCEDURES PERFORMED:   1. Cystourethroscopy with left retrograde pyelography  2. Placement of left ureteral stent.  3. Intraoperative interpretation of fluoroscopic imaging.     STAFF SURGEON: Dr. Darwin Jimenez MD, present for entire case.     RESIDENT: Dimitri Rodriguez MD; Yeimy Ryan MD    ANESTHESIA: General    ESTIMATED BLOOD LOSS: 0 mL.     DRAINS:  6Fr x 24 cm double J Left ureteral stent    SPECIMEN: Left renal pelvis fluid for culture      OPERATIVE INDICATIONS:   Aubrey Duncan is a 62 year old male who presented with a left 1.2 cm obstructing ureteral stone. The patient was counseled on the alternatives, risks, and benefits and elected to proceed with the above stated procedure.    DESCRIPTION OF PROCEDURE:    After informed consent was obtained, the patient was taken to the operating room, and moved to the operating table.  After adequate anesthesia was induced, the patient was repositioned in dorsal lithotomy position and prepped and draped in the usual sterile fashion. A timeout was taken to confirm correct patient, procedure and laterality.     A 22-[place] cystoscope was inserted into a well lubricated urethra. The urethra was unremarkable.  The bladder was free of tumors, stones or diverticuli.  The media was clear.  Bilateral ureteral orifices were orthotopic.  A Sensor guidewire was advanced into the left renal pelvis with the aid of a 5-[place] open ended ureteral catheter. Dark, turbid fluid was collected and sent for culture.  A retrograde pyelogram demonstrated mild hydronephrosis.  The wire was replaced and a 6Fr x 24 cm double J Left ureteral stent was advanced over the guidewire under fluoroscopic guidance with a good curl in the renal pelvis and bladder.  The stent passed up easily with no appreciable resistance.  The bladder was then drained.    The patient tolerated the procedure well.   There were no complications.         PLAN:   - Discharge pending tolerating diet, ambulating, pain controlled, etc  - Follow-up with Dr. Marquis for definitive stone management

## 2017-10-19 ENCOUNTER — CARE COORDINATION (OUTPATIENT)
Dept: CARE COORDINATION | Facility: CLINIC | Age: 62
End: 2017-10-19

## 2017-10-19 LAB
BACTERIA SPEC CULT: NO GROWTH
SPECIMEN SOURCE: NORMAL

## 2017-11-02 ENCOUNTER — TELEPHONE (OUTPATIENT)
Dept: UROLOGY | Facility: CLINIC | Age: 62
End: 2017-11-02

## 2017-11-02 NOTE — TELEPHONE ENCOUNTER
Called patient with updated time change for surgery on 11/9/17 with Dr Marquis.  Patient will check in at 8:15 am @ [facility] OR for surgery on 11/9/17.

## 2017-11-08 ENCOUNTER — ANESTHESIA EVENT (OUTPATIENT)
Dept: SURGERY | Facility: CLINIC | Age: 62
End: 2017-11-08
Payer: MEDICARE

## 2017-11-09 ENCOUNTER — ANESTHESIA (OUTPATIENT)
Dept: SURGERY | Facility: CLINIC | Age: 62
End: 2017-11-09
Payer: MEDICARE

## 2017-11-09 ENCOUNTER — HOSPITAL ENCOUNTER (OUTPATIENT)
Facility: CLINIC | Age: 62
Discharge: HOME | End: 2017-11-09
Attending: UROLOGY | Admitting: UROLOGY
Payer: MEDICARE

## 2017-11-09 ENCOUNTER — AMBULATORY - GICH (OUTPATIENT)
Dept: SCHEDULING | Facility: OTHER | Age: 62
End: 2017-11-09

## 2017-11-09 ENCOUNTER — APPOINTMENT (OUTPATIENT)
Dept: GENERAL RADIOLOGY | Facility: CLINIC | Age: 62
End: 2017-11-09
Attending: UROLOGY
Payer: MEDICARE

## 2017-11-09 VITALS
WEIGHT: 158.29 LBS | OXYGEN SATURATION: 98 % | TEMPERATURE: 97.8 F | BODY MASS INDEX: 22.66 KG/M2 | HEART RATE: 64 BPM | HEIGHT: 70 IN | DIASTOLIC BLOOD PRESSURE: 80 MMHG | RESPIRATION RATE: 16 BRPM | SYSTOLIC BLOOD PRESSURE: 142 MMHG

## 2017-11-09 DIAGNOSIS — N20.1 URETERAL STONE: Primary | ICD-10-CM

## 2017-11-09 LAB — GLUCOSE BLDC GLUCOMTR-MCNC: 130 MG/DL (ref 70–99)

## 2017-11-09 PROCEDURE — 82962 GLUCOSE BLOOD TEST: CPT

## 2017-11-09 PROCEDURE — 25000128 H RX IP 250 OP 636: Performed by: NURSE ANESTHETIST, CERTIFIED REGISTERED

## 2017-11-09 PROCEDURE — C1758 CATHETER, URETERAL: HCPCS | Performed by: UROLOGY

## 2017-11-09 PROCEDURE — 82365 CALCULUS SPECTROSCOPY: CPT | Performed by: UROLOGY

## 2017-11-09 PROCEDURE — C1769 GUIDE WIRE: HCPCS | Performed by: UROLOGY

## 2017-11-09 PROCEDURE — 36000059 ZZH SURGERY LEVEL 3 EA 15 ADDTL MIN UMMC: Performed by: UROLOGY

## 2017-11-09 PROCEDURE — 25000128 H RX IP 250 OP 636: Performed by: UROLOGY

## 2017-11-09 PROCEDURE — 37000008 ZZH ANESTHESIA TECHNICAL FEE, 1ST 30 MIN: Performed by: UROLOGY

## 2017-11-09 PROCEDURE — 40000170 ZZH STATISTIC PRE-PROCEDURE ASSESSMENT II: Performed by: UROLOGY

## 2017-11-09 PROCEDURE — 27210794 ZZH OR GENERAL SUPPLY STERILE: Performed by: UROLOGY

## 2017-11-09 PROCEDURE — 36000061 ZZH SURGERY LEVEL 3 W FLUORO 1ST 30 MIN - UMMC: Performed by: UROLOGY

## 2017-11-09 PROCEDURE — 25000125 ZZHC RX 250: Performed by: NURSE ANESTHETIST, CERTIFIED REGISTERED

## 2017-11-09 PROCEDURE — 37000009 ZZH ANESTHESIA TECHNICAL FEE, EACH ADDTL 15 MIN: Performed by: UROLOGY

## 2017-11-09 PROCEDURE — C1894 INTRO/SHEATH, NON-LASER: HCPCS | Performed by: UROLOGY

## 2017-11-09 PROCEDURE — 40000278 XR SURGERY CARM FLUORO LESS THAN 5 MIN: Mod: TC

## 2017-11-09 PROCEDURE — 25000566 ZZH SEVOFLURANE, EA 15 MIN: Performed by: UROLOGY

## 2017-11-09 PROCEDURE — 71000027 ZZH RECOVERY PHASE 2 EACH 15 MINS: Performed by: UROLOGY

## 2017-11-09 PROCEDURE — 71000014 ZZH RECOVERY PHASE 1 LEVEL 2 FIRST HR: Performed by: UROLOGY

## 2017-11-09 PROCEDURE — C2617 STENT, NON-COR, TEM W/O DEL: HCPCS | Performed by: UROLOGY

## 2017-11-09 PROCEDURE — 25500064 ZZH RX 255 OP 636: Performed by: UROLOGY

## 2017-11-09 DEVICE — STENT URETERAL PERCUFLEX PLUS 6FRX26CM M0061752630: Type: IMPLANTABLE DEVICE | Site: URETER | Status: FUNCTIONAL

## 2017-11-09 RX ORDER — PHENYLEPHRINE HCL IN 0.9% NACL 1 MG/10 ML
SYRINGE (ML) INTRAVENOUS PRN
Status: DISCONTINUED | OUTPATIENT
Start: 2017-11-09 | End: 2017-11-09

## 2017-11-09 RX ORDER — CIPROFLOXACIN 500 MG/1
500 TABLET, FILM COATED ORAL 2 TIMES DAILY
Qty: 10 TABLET | Refills: 0 | Status: SHIPPED | OUTPATIENT
Start: 2017-11-09 | End: 2017-11-14

## 2017-11-09 RX ORDER — PHENAZOPYRIDINE HYDROCHLORIDE 200 MG/1
200 TABLET, FILM COATED ORAL 3 TIMES DAILY PRN
Qty: 9 TABLET | Refills: 0 | Status: SHIPPED | OUTPATIENT
Start: 2017-11-09 | End: 2017-12-19

## 2017-11-09 RX ORDER — ONDANSETRON 4 MG/1
4 TABLET, ORALLY DISINTEGRATING ORAL EVERY 30 MIN PRN
Status: DISCONTINUED | OUTPATIENT
Start: 2017-11-09 | End: 2017-11-09 | Stop reason: HOSPADM

## 2017-11-09 RX ORDER — SODIUM CHLORIDE, SODIUM LACTATE, POTASSIUM CHLORIDE, CALCIUM CHLORIDE 600; 310; 30; 20 MG/100ML; MG/100ML; MG/100ML; MG/100ML
INJECTION, SOLUTION INTRAVENOUS CONTINUOUS
Status: DISCONTINUED | OUTPATIENT
Start: 2017-11-09 | End: 2017-11-09 | Stop reason: HOSPADM

## 2017-11-09 RX ORDER — ONDANSETRON 2 MG/ML
4 INJECTION INTRAMUSCULAR; INTRAVENOUS EVERY 30 MIN PRN
Status: DISCONTINUED | OUTPATIENT
Start: 2017-11-09 | End: 2017-11-09 | Stop reason: HOSPADM

## 2017-11-09 RX ORDER — CEFAZOLIN SODIUM 1 G/3ML
1 INJECTION, POWDER, FOR SOLUTION INTRAMUSCULAR; INTRAVENOUS SEE ADMIN INSTRUCTIONS
Status: DISCONTINUED | OUTPATIENT
Start: 2017-11-09 | End: 2017-11-09 | Stop reason: HOSPADM

## 2017-11-09 RX ORDER — ONDANSETRON 2 MG/ML
INJECTION INTRAMUSCULAR; INTRAVENOUS PRN
Status: DISCONTINUED | OUTPATIENT
Start: 2017-11-09 | End: 2017-11-09

## 2017-11-09 RX ORDER — FENTANYL CITRATE 50 UG/ML
INJECTION, SOLUTION INTRAMUSCULAR; INTRAVENOUS PRN
Status: DISCONTINUED | OUTPATIENT
Start: 2017-11-09 | End: 2017-11-09

## 2017-11-09 RX ORDER — MEPERIDINE HYDROCHLORIDE 25 MG/ML
12.5 INJECTION INTRAMUSCULAR; INTRAVENOUS; SUBCUTANEOUS EVERY 5 MIN PRN
Status: DISCONTINUED | OUTPATIENT
Start: 2017-11-09 | End: 2017-11-09 | Stop reason: HOSPADM

## 2017-11-09 RX ORDER — DEXAMETHASONE SODIUM PHOSPHATE 4 MG/ML
INJECTION, SOLUTION INTRA-ARTICULAR; INTRALESIONAL; INTRAMUSCULAR; INTRAVENOUS; SOFT TISSUE PRN
Status: DISCONTINUED | OUTPATIENT
Start: 2017-11-09 | End: 2017-11-09

## 2017-11-09 RX ORDER — FENTANYL CITRATE 50 UG/ML
25-50 INJECTION, SOLUTION INTRAMUSCULAR; INTRAVENOUS
Status: DISCONTINUED | OUTPATIENT
Start: 2017-11-09 | End: 2017-11-09 | Stop reason: HOSPADM

## 2017-11-09 RX ORDER — SULFAMETHOXAZOLE/TRIMETHOPRIM 800-160 MG
1 TABLET ORAL DAILY
Qty: 5 TABLET | Refills: 0 | Status: SHIPPED | OUTPATIENT
Start: 2017-11-09 | End: 2017-12-19

## 2017-11-09 RX ORDER — SODIUM CHLORIDE, SODIUM LACTATE, POTASSIUM CHLORIDE, CALCIUM CHLORIDE 600; 310; 30; 20 MG/100ML; MG/100ML; MG/100ML; MG/100ML
INJECTION, SOLUTION INTRAVENOUS CONTINUOUS PRN
Status: DISCONTINUED | OUTPATIENT
Start: 2017-11-09 | End: 2017-11-09

## 2017-11-09 RX ORDER — HYDROMORPHONE HYDROCHLORIDE 4 MG/1
4 TABLET ORAL EVERY 6 HOURS PRN
Qty: 10 TABLET | Refills: 0 | Status: SHIPPED | OUTPATIENT
Start: 2017-11-09 | End: 2017-12-19

## 2017-11-09 RX ORDER — HYDROMORPHONE HYDROCHLORIDE 1 MG/ML
.3-.5 INJECTION, SOLUTION INTRAMUSCULAR; INTRAVENOUS; SUBCUTANEOUS EVERY 5 MIN PRN
Status: DISCONTINUED | OUTPATIENT
Start: 2017-11-09 | End: 2017-11-09 | Stop reason: HOSPADM

## 2017-11-09 RX ORDER — PROPOFOL 10 MG/ML
INJECTION, EMULSION INTRAVENOUS PRN
Status: DISCONTINUED | OUTPATIENT
Start: 2017-11-09 | End: 2017-11-09

## 2017-11-09 RX ORDER — LIDOCAINE HYDROCHLORIDE 20 MG/ML
INJECTION, SOLUTION INFILTRATION; PERINEURAL PRN
Status: DISCONTINUED | OUTPATIENT
Start: 2017-11-09 | End: 2017-11-09

## 2017-11-09 RX ORDER — CEFAZOLIN SODIUM 2 G/100ML
2 INJECTION, SOLUTION INTRAVENOUS
Status: COMPLETED | OUTPATIENT
Start: 2017-11-09 | End: 2017-11-09

## 2017-11-09 RX ADMIN — ONDANSETRON 4 MG: 2 INJECTION INTRAMUSCULAR; INTRAVENOUS at 12:42

## 2017-11-09 RX ADMIN — PHENYLEPHRINE HYDROCHLORIDE 0.5 MCG/KG/MIN: 10 INJECTION, SOLUTION INTRAMUSCULAR; INTRAVENOUS; SUBCUTANEOUS at 10:47

## 2017-11-09 RX ADMIN — FENTANYL CITRATE 25 MCG: 50 INJECTION, SOLUTION INTRAMUSCULAR; INTRAVENOUS at 11:30

## 2017-11-09 RX ADMIN — FENTANYL CITRATE 25 MCG: 50 INJECTION, SOLUTION INTRAMUSCULAR; INTRAVENOUS at 12:24

## 2017-11-09 RX ADMIN — SODIUM CHLORIDE, POTASSIUM CHLORIDE, SODIUM LACTATE AND CALCIUM CHLORIDE: 600; 310; 30; 20 INJECTION, SOLUTION INTRAVENOUS at 11:55

## 2017-11-09 RX ADMIN — DEXAMETHASONE SODIUM PHOSPHATE 4 MG: 4 INJECTION, SOLUTION INTRAMUSCULAR; INTRAVENOUS at 10:58

## 2017-11-09 RX ADMIN — SODIUM CHLORIDE, POTASSIUM CHLORIDE, SODIUM LACTATE AND CALCIUM CHLORIDE: 600; 310; 30; 20 INJECTION, SOLUTION INTRAVENOUS at 10:33

## 2017-11-09 RX ADMIN — MIDAZOLAM HYDROCHLORIDE 1 MG: 1 INJECTION, SOLUTION INTRAMUSCULAR; INTRAVENOUS at 10:32

## 2017-11-09 RX ADMIN — LIDOCAINE HYDROCHLORIDE 100 MG: 20 INJECTION, SOLUTION INFILTRATION; PERINEURAL at 10:39

## 2017-11-09 RX ADMIN — PROPOFOL 150 MG: 10 INJECTION, EMULSION INTRAVENOUS at 10:39

## 2017-11-09 RX ADMIN — Medication 100 MCG: at 10:47

## 2017-11-09 RX ADMIN — FENTANYL CITRATE 100 MCG: 50 INJECTION, SOLUTION INTRAMUSCULAR; INTRAVENOUS at 10:39

## 2017-11-09 RX ADMIN — FENTANYL CITRATE 50 MCG: 50 INJECTION, SOLUTION INTRAMUSCULAR; INTRAVENOUS at 11:11

## 2017-11-09 RX ADMIN — CEFAZOLIN SODIUM 2 G: 2 INJECTION, SOLUTION INTRAVENOUS at 10:44

## 2017-11-09 NOTE — ANESTHESIA POSTPROCEDURE EVALUATION
Patient: Aubrey Duncan    Procedure(s):  Cystoscopy, Left Ureteroscopy, Laser Lithotripsy, Stent Replacement - Wound Class: II-Clean Contaminated    Diagnosis:Ureteral Stone  Diagnosis Additional Information: No value filed.    Anesthesia Type:  General, LMA    Note:  Anesthesia Post Evaluation    Patient location during evaluation: PACU  Patient participation: Able to fully participate in evaluation  Level of consciousness: awake and alert  Pain management: adequate  Airway patency: patent  Cardiovascular status: acceptable  Respiratory status: acceptable  Hydration status: acceptable  PONV: none     Anesthetic complications: None          Last vitals:  Vitals:    11/09/17 0820 11/09/17 1256   BP: 144/87 (!) 141/91   Pulse: 64    Resp: 18 14   Temp: 36.6  C (97.9  F) 36.9  C (98.4  F)   SpO2: 98% 99%         Electronically Signed By: Leo Lozoya MD, MD  November 9, 2017  1:15 PM

## 2017-11-09 NOTE — IP AVS SNAPSHOT
MRN:[id number]                      After Visit Summary   11/9/2017    Aubrey Duncan    MRN: [id number]           Thank you!     Thank you for choosing [facility] for your care. Our goal is always to provide you with excellent care. Hearing back from our patients is one way we can continue to improve our services. Please take a few minutes to complete the written survey that you may receive in the mail after you visit with us. Thank you!        Patient Information     Date Of Birth          1955        About your hospital stay     You were admitted on:  November 9, 2017 You last received care in the:   PACU    You were discharged on:  November 9, 2017       Who to Call     For medical emergencies, please call 911.  For non-urgent questions about your medical care, please call your primary care provider or clinic, [phone]  For questions related to your surgery, please call your surgery clinic        Attending Provider     Provider Specialty    Osvaldo Marquis MD Urology       Primary Care Provider Office Phone # Fax #    David Terrell Jiang [phone]      After Care Instructions     Discharge Instructions       Activity  - Do not strain with bowel movements.  - Do not drive until you can press the brake pedal quickly and fully without pain.   - Do not operate a motor vehicle while taking narcotic pain medications.     Stents  - You have a left ureteral stents in place. Stents can cause some discomfort, including some blood in your urine (which is normal), the feeling or urge to urinate frequently, burning with urination and pressure/discomfort in your back while voiding. Stay well hydrated to keep your urine as clear as possible.    -Remove the stent on Tuesday. You can do that by pulling on the thread until the entire stent comes out     Medications  - Flomax - to decrease stent discomfort   - Transition from narcotic pain medications to tylenol (acetaminophen)  as you are able.  Wean yourself off all pain medications as you are able.  - Some pain medications contain both tylenol (acetaminophen) and a narcotic (Norco, vicodin, percocet), do not take more than 4,000mg of Tylenol (acetaminophen) from all sources in any 24 hour period.  - Narcotics can make you constipated.  Take over the counter fiber (metamucil or benefiber) and stool softeners (miralax, docusate or senna) while taking narcotic pain medications, but stop if you develop diarrhea.  - No driving or operating machinery while taking narcotic pain medications                  Your next 10 appointments already scheduled     Dec 19, 2017  3:30 PM CST   (Arrive by 3:15 PM)   Post-Op with Osvaldo Marquis MD   [facility] ([facility])    [address] [place] [id number] [phone]            Feb 22, 2018  8:45 AM CST   Lab with  LAB   [facility] ([facility])    [address] [place] [id number] [phone]            Feb 22, 2018  9:15 AM CST   (Arrive by 9:00 AM)   XR CHEST 2 VIEWS with XR[address] [place] [address] Xray ([facility])    [address] [place] [address] [id number] [phone]           Please bring a list of your current medicines to your exam. (Include vitamins, minerals and over-thecounter medicines.) Leave your valuables at home.  Tell your doctor if there is a chance you may be pregnant.  You do not need to do anything special for this exam.            Feb 22, 2018  9:30 AM CST   DX HIP/PELVIS/SPINE with DX[address] [place] [address] Dexa ([facility])    [address] [place] [address] [id number] [phone]           Please do not take any of the following 24 hours prior to the day of your exam: vitamins, calcium tablets, antacids.  If possible,  please wear clothes without metal (snaps, zippers). A sweatsuit works well.            Feb 22, 2018 10:00 AM CST   SIX MINUTE WALK with UC PFL B, UC PFL 6 MINUTE WALK 2   [facility] Pulmonary Function Testing ([facility])    [address] [place] [phone]   [phone]            Feb 22, 2018 11:00 AM CST   (Arrive by 10:45 AM)   Return Lung Transplant with Kirk Broussard MD   [facility] ([facility])    [address] [place] [phone]   [phone]            Feb 22, 2018  1:00 PM CST   (Arrive by 12:45 PM)   Transplant Skin Check with SAMMI Luz MD   [facility]at[facility] ([facility])    [address] [place] [phone]   [phone]              Further instructions from your care team       [facility]  Same-Day Surgery   Adult Discharge Orders & Instructions     For 24 hours after surgery    1. Get plenty of rest.  A responsible adult must stay with you for at least 24 hours after you leave the hospital.   2. Do not drive or use heavy equipment.  If you have weakness or tingling, don't drive or use heavy equipment until this feeling goes away.  3. Do not drink alcohol.  4. Avoid strenuous or risky activities.  Ask for help when climbing stairs.   5. You may feel lightheaded.  IF so, sit for a few minutes before standing.  Have someone help you get up.   6. If you have nausea (feel sick to your stomach): Drink only clear liquids such as apple juice, ginger ale, broth or 7-Up.  Rest may also help.  Be sure to drink enough fluids.  Move to a regular diet as you feel able.  7. You may have a slight fever. Call the doctor if your fever is over 100 F (37.7 C) (taken under the tongue) or lasts longer than 24 hours.  8. You may have a dry mouth, a sore throat, muscle aches or trouble  "sleeping.  These should go away after 24 hours.  9. Do not make important or legal decisions.   Call your doctor for any of the followin.  Signs of infection (fever, growing tenderness at the surgery site, a large amount of drainage or bleeding, severe pain, foul-smelling drainage, redness, swelling).    2. It has been over 8 to 10 hours since surgery and you are still not able to urinate (pass water).    3.  Headache for over 24 hours.    4.  Numbness, tingling or weakness the day after surgery (if you had spinal anesthesia).  To contact a doctor, call ________________________________________ or:        [phone] and ask for the resident on call for   ______________________________________________ (answered 24 hours a day)      Emergency Department:    [facility]: [phone]       (TTY for hearing impaired: [phone])    [facility]: [phone]       (TTY for hearing impaired: [phone])        Pending Results     Date and Time Order Name Status Description    2017 1244 Stone analysis In process             Admission Information     Date & Time Provider Department Dept. Phone    2017 Osvaldo Marquis MD  PACU [phone]      Your Vitals Were     Blood Pressure Pulse Temperature Respirations Height Weight    151/87 64 97.8  F (36.6  C) (Oral) 15 1.778 m (5' 10\") 71.8 kg (158 lb 4.6 oz)    Pulse Oximetry BMI (Body Mass Index)                99% 22.71 kg/m2          [facility] Information     [facility] gives you secure access to your electronic health record. If you see a primary care provider, you can also send messages to your care team and make appointments. If you have questions, please call your primary care clinic.  If you do not have a primary care provider, please call [phone] and they will assist you.        Care EveryWhere ID     This is your Care EveryWhere ID. This could be used by other organizations to access your [place] medical records  [id number]   "      Equal Access to Services     [facility]: Hadii aad ku hadabigailniurka Dalton, wasamanthada luqadaha, qaybta shajibertramdaniel luu. So [facility] [phone].    ATENCIÓN: Si habla español, tiene a neely disposición servicios gratuitos de asistencia lingüística. Mark Anthonyame al [phone].    We comply with applicable federal civil rights laws and [place] laws. We do not discriminate on the basis of race, color, national origin, age, disability, sex, sexual orientation, or gender identity.               Review of your medicines      START taking        Dose / Directions    phenazopyridine 200 MG tablet   Commonly known as:  PYRIDIUM   Used for:  Ureteral stone        Dose:  200 mg   Take 1 tablet (200 mg) by mouth 3 times daily as needed for irritation   Quantity:  9 tablet   Refills:  0         CONTINUE these medicines which may have CHANGED, or have new prescriptions. If we are uncertain of the size of tablets/capsules you have at home, strength may be listed as something that might have changed.        Dose / Directions    HYDROmorphone 4 MG tablet   Commonly known as:  DILAUDID   This may have changed:    - medication strength  - how much to take   Used for:  Ureteral stone        Dose:  4 mg   Take 1 tablet (4 mg) by mouth every 6 hours as needed for moderate to severe pain   Quantity:  10 tablet   Refills:  0       tacrolimus 1 MG capsule   Commonly known as:  GENERIC EQUIVALENT   This may have changed:  additional instructions   Used for:  Lung transplant status, bilateral (H)        Take 3 caps every am and 3 mg every pm.   Quantity:  180 capsule   Refills:  12         CONTINUE these medicines which have NOT CHANGED        Dose / Directions    albuterol 108 (90 BASE) MCG/ACT Inhaler   Commonly known as:  PROAIR HFA/PROVENTIL HFA/VENTOLIN HFA   Used for:  Wheezing        Dose:  2 puff   Inhale 2 puffs into the lungs every 6 hours as needed for shortness of breath / dyspnea or wheezing    Quantity:  3 Inhaler   Refills:  11       alendronate 70 MG tablet   Commonly known as:  FOSAMAX   Used for:  Osteopenia        Dose:  70 mg   Take 1 tablet (70 mg) by mouth every 7 days Take with 8 ounces water, at least 60 min before breakfast, and stay upright for at least 30 min after dose.   Quantity:  4 tablet   Refills:  11       azaTHIOprine 50 MG tablet   Commonly known as:  IMURAN   Used for:  Lung replaced by transplant (H)        Dose:  25 mg   Take 0.5 tablets (25 mg) by mouth daily   Quantity:  15 tablet   Refills:  11       calcium-vitamin D 600-400 MG-UNIT per tablet   Commonly known as:  CALTRATE   Used for:  Lung transplant status, bilateral (H)        Dose:  1 tablet   Take 1 tablet by mouth 2 times daily (with meals)   Quantity:  60 tablet   Refills:  12       furosemide 20 MG tablet   Commonly known as:  LASIX   Used for:  Hypertension        TAKE ONE TABLET BY MOUTH EVERY DAY   Quantity:  30 tablet   Refills:  11       LISINOPRIL PO        Dose:  5 mg   Take 5 mg by mouth daily   Refills:  0       loperamide 2 MG capsule   Commonly known as:  IMODIUM   Used for:  Lung transplant status, bilateral (H)        Dose:  2 mg   Take 1 capsule (2 mg) by mouth 4 times daily as needed for diarrhea   Quantity:  120 capsule   Refills:  12       metoprolol 25 MG tablet   Commonly known as:  LOPRESSOR   Used for:  Hypertension        TAKE ONE TABLET BY MOUTH TWICE A DAY   Quantity:  60 tablet   Refills:  12       multivitamin, therapeutic Tabs tablet   Used for:  Lung transplant status, bilateral (H)        Dose:  1 tablet   Take 1 tablet by mouth daily   Quantity:  30 tablet   Refills:  12       omeprazole 20 MG CR capsule   Commonly known as:  priLOSEC   Used for:  Lung transplant status, bilateral (H)        Dose:  20 mg   Take 1 capsule (20 mg) by mouth 2 times daily (before meals)   Quantity:  60 capsule   Refills:  12       order for DME   Used for:  Wound infection        Equipment being ordered:  Polymem Non-Adhesive Pad Treatment Diagnosis: Wound infection   Quantity:  30 pad   Refills:  1       predniSONE 5 MG tablet   Commonly known as:  DELTASONE   Used for:  Lung transplant status, bilateral (H)        TAKE ONE TABLET BY MOUTH EVERY MORNING AND TAKE ONE-HALF TABLET BY MOUTH EVERY EVENING   Quantity:  45 tablet   Refills:  12       sildenafil 25 MG tablet   Commonly known as:  VIAGRA   Used for:  ED (erectile dysfunction)        Dose:  25 mg   Take 1 tablet (25 mg) by mouth as needed for erectile dysfunction   Quantity:  30 tablet   Refills:  0       sulfamethoxazole-trimethoprim 400-80 MG per tablet   Commonly known as:  BACTRIM/SEPTRA   Used for:  Lung replaced by transplant (H)        TAKE ONE TABLET BY MOUTH THREE TIMES A WEEK   Quantity:  12 tablet   Refills:  11       tamsulosin 0.4 MG capsule   Commonly known as:  FLOMAX   Used for:  Ureteral stone        Dose:  0.4 mg   Take 1 capsule (0.4 mg) by mouth daily   Quantity:  30 capsule   Refills:  1       valGANciclovir 450 MG tablet   Commonly known as:  VALCYTE   Used for:  CMV (cytomegalovirus infection) (H), Lung replaced by transplant (H)        TAKE ONE TABLETS (450MG) BY MOUTH TWO TIMES A DAY   Quantity:  60 tablet   Refills:  11            Where to get your medicines      These medications were sent to [facility] [place], [place] - [address]  [address] [address]     Phone:  [phone]     phenazopyridine 200 MG tablet         Some of these will need a paper prescription and others can be bought over the counter. Ask your nurse if you have questions.     Bring a paper prescription for each of these medications     HYDROmorphone 4 MG tablet                Protect others around you: Learn how to safely use, store and throw away your medicines at www.disposemymeds.org.             Medication List: This is a list of all your medications and when to take them. Check marks below indicate your daily  home schedule. Keep this list as a reference.      Medications           Morning Afternoon Evening Bedtime As Needed    albuterol 108 (90 BASE) MCG/ACT Inhaler   Commonly known as:  PROAIR HFA/PROVENTIL HFA/VENTOLIN HFA   Inhale 2 puffs into the lungs every 6 hours as needed for shortness of breath / dyspnea or wheezing                                alendronate 70 MG tablet   Commonly known as:  FOSAMAX   Take 1 tablet (70 mg) by mouth every 7 days Take with 8 ounces water, at least 60 min before breakfast, and stay upright for at least 30 min after dose.                                azaTHIOprine 50 MG tablet   Commonly known as:  IMURAN   Take 0.5 tablets (25 mg) by mouth daily                                calcium-vitamin D 600-400 MG-UNIT per tablet   Commonly known as:  CALTRATE   Take 1 tablet by mouth 2 times daily (with meals)                                furosemide 20 MG tablet   Commonly known as:  LASIX   TAKE ONE TABLET BY MOUTH EVERY DAY                                HYDROmorphone 4 MG tablet   Commonly known as:  DILAUDID   Take 1 tablet (4 mg) by mouth every 6 hours as needed for moderate to severe pain                                LISINOPRIL PO   Take 5 mg by mouth daily                                loperamide 2 MG capsule   Commonly known as:  IMODIUM   Take 1 capsule (2 mg) by mouth 4 times daily as needed for diarrhea                                metoprolol 25 MG tablet   Commonly known as:  LOPRESSOR   TAKE ONE TABLET BY MOUTH TWICE A DAY                                multivitamin, therapeutic Tabs tablet   Take 1 tablet by mouth daily                                omeprazole 20 MG CR capsule   Commonly known as:  priLOSEC   Take 1 capsule (20 mg) by mouth 2 times daily (before meals)                                order for DME   Equipment being ordered: Polymem Non-Adhesive Pad Treatment Diagnosis: Wound infection                                phenazopyridine 200 MG tablet    Commonly known as:  PYRIDIUM   Take 1 tablet (200 mg) by mouth 3 times daily as needed for irritation                                predniSONE 5 MG tablet   Commonly known as:  DELTASONE   TAKE ONE TABLET BY MOUTH EVERY MORNING AND TAKE ONE-HALF TABLET BY MOUTH EVERY EVENING                                sildenafil 25 MG tablet   Commonly known as:  VIAGRA   Take 1 tablet (25 mg) by mouth as needed for erectile dysfunction                                sulfamethoxazole-trimethoprim 400-80 MG per tablet   Commonly known as:  BACTRIM/SEPTRA   TAKE ONE TABLET BY MOUTH THREE TIMES A WEEK                                tacrolimus 1 MG capsule   Commonly known as:  GENERIC EQUIVALENT   Take 3 caps every am and 3 mg every pm.                                tamsulosin 0.4 MG capsule   Commonly known as:  FLOMAX   Take 1 capsule (0.4 mg) by mouth daily                                valGANciclovir 450 MG tablet   Commonly known as:  VALCYTE   TAKE ONE TABLETS (450MG) BY MOUTH TWO TIMES A DAY

## 2017-11-09 NOTE — ANESTHESIA CARE TRANSFER NOTE
Patient: Aubrey Duncan    Procedure(s):  Cystoscopy, Left Ureteroscopy, Laser Lithotripsy, Stent Replacement - Wound Class: II-Clean Contaminated    Diagnosis: Ureteral Stone  Diagnosis Additional Information: No value filed.    Anesthesia Type:   General, LMA     Note:  Airway :Face Mask  Patient transferred to:PACU  Comments: To PAR.  Report to RN.  VSS  141/91, sat 99%, HR 73, RR 21Handoff Report: Identifed the Patient, Identified the Reponsible Provider, Reviewed the pertinent medical history, Discussed the surgical course, Reviewed Intra-OP anesthesia mangement and issues during anesthesia, Set expectations for post-procedure period and Allowed opportunity for questions and acknowledgement of understanding      Vitals: (Last set prior to Anesthesia Care Transfer)    CRNA VITALS  11/9/2017 1224 - 11/9/2017 1305      11/9/2017             Pulse: 81    SpO2: 97 %    Resp Rate (observed): (!)  4                Electronically Signed By: BRANDI Manriquez CRNA  November 9, 2017  1:05 PM

## 2017-11-09 NOTE — DISCHARGE INSTRUCTIONS
[facility]  Same-Day Surgery   Adult Discharge Orders & Instructions     For 24 hours after surgery    1. Get plenty of rest.  A responsible adult must stay with you for at least 24 hours after you leave the hospital.   2. Do not drive or use heavy equipment.  If you have weakness or tingling, don't drive or use heavy equipment until this feeling goes away.  3. Do not drink alcohol.  4. Avoid strenuous or risky activities.  Ask for help when climbing stairs.   5. You may feel lightheaded.  IF so, sit for a few minutes before standing.  Have someone help you get up.   6. If you have nausea (feel sick to your stomach): Drink only clear liquids such as apple juice, ginger ale, broth or 7-Up.  Rest may also help.  Be sure to drink enough fluids.  Move to a regular diet as you feel able.  7. You may have a slight fever. Call the doctor if your fever is over 100 F (37.7 C) (taken under the tongue) or lasts longer than 24 hours.  8. You may have a dry mouth, a sore throat, muscle aches or trouble sleeping.  These should go away after 24 hours.  9. Do not make important or legal decisions.   Call your doctor for any of the followin.  Signs of infection (fever, growing tenderness at the surgery site, a large amount of drainage or bleeding, severe pain, foul-smelling drainage, redness, swelling).    2. It has been over 8 to 10 hours since surgery and you are still not able to urinate (pass water).    3.  Headache for over 24 hours.    4.  Numbness, tingling or weakness the day after surgery (if you had spinal anesthesia).  To contact a doctor, call ________________________________________ or:        [phone] and ask for the resident on call for   ______________________________________________ (answered 24 hours a day)      Emergency Department:    [facility]: [phone]       (TTY for hearing impaired: [phone])    [facility]: [phone]       (TTY for  hearing impaired: [phone])

## 2017-11-09 NOTE — BRIEF OP NOTE
[facility], [place]    Brief Operative Note    Pre-operative diagnosis: Ureteral Stone  Post-operative diagnosis Same as above   Procedure: Procedure(s):  Cystoscopy, Left Ureteroscopy, Laser Lithotripsy, Stent Replacement - Wound Class: II-Clean Contaminated  Surgeon: Surgeon(s) and Role:     * Osvaldo Marquis MD - Primary     * Stevie Chatterjee MD - Resident - Assisting  Anesthesia: General   Estimated blood loss: Minimal  Drains: 6 Fr x 26 cm doublle J stent   Specimens:   ID Type Source Tests Collected by Time Destination   1 : left ureteral stone Calculus/Stone Ureter, Left STONE ANALYSIS Osvaldo Marquis MD 11/9/2017 12:30 PM      Findings:   stone free at end of procedure .  Complications: None.  Implants: None.

## 2017-11-09 NOTE — ANESTHESIA PREPROCEDURE EVALUATION
Anesthesia Evaluation     . Pt has had prior anesthetic. Type: General    No history of anesthetic complications          ROS/MED HX    ENT/Pulmonary:     (+), . Other pulmonary disease alpha 1 antitrypsin disease s/t bilateral lung transplant.    Neurologic:       Cardiovascular:     (+) hypertension----. : . . . :. .       METS/Exercise Tolerance:     Hematologic:         Musculoskeletal:         GI/Hepatic:     (+) liver disease,       Renal/Genitourinary:         Endo:         Psychiatric:         Infectious Disease:         Malignancy:         Other:                     Physical Exam  Normal systems: cardiovascular, pulmonary and dental    Airway   Mallampati: II  TM distance: >3 FB  Neck ROM: full    Dental     Cardiovascular       Pulmonary                     Anesthesia Plan      History & Physical Review  History and physical reviewed and following examination; no interval change.    ASA Status:  3 .        Plan for General and LMA with Propofol induction. Maintenance will be Balanced.    PONV prophylaxis:  Ondansetron (or other 5HT-3)       Postoperative Care  Postoperative pain management:  IV analgesics and Oral pain medications.      Consents  Anesthetic plan, risks, benefits and alternatives discussed with:  Patient.  Use of blood products discussed: No .   .                          .

## 2017-11-09 NOTE — IP AVS SNAPSHOT
[facility]    [address] [place] [facility] [address]    Phone:  [phone]                                       After Visit Summary   11/9/2017    Aubrey Duncan    MRN: [id number]           After Visit Summary Signature Page     I have received my discharge instructions, and my questions have been answered. I have discussed any challenges I see with this plan with the nurse or doctor.    ..........................................................................................................................................  Patient/Patient Representative Signature      ..........................................................................................................................................  Patient Representative Print Name and Relationship to Patient    ..................................................               ................................................  Date                                            Time    ..........................................................................................................................................  Reviewed by Signature/Title    ...................................................              ..............................................  Date                                                            Time

## 2017-11-09 NOTE — OP NOTE
OPERATIVE REPORT    PREOPERATIVE DIAGNOSIS:  Left Ureteral Stone    POSTOPERATIVE DIAGNOSIS:  Same    PROCEDURES PERFORMED:   1. Cystourethroscopy  2. Left ureteroscopy  3. Left  retrograde pyelogram with interpretation of intraoperative fluoroscopic imaging  4. Holmium laser lithotripsy with basket stone extraction  5. Left ureteral stent exchange    STAFF SURGEON: Osvaldo Marquis, present and participatory for the entire case.   RESIDENT(S): Stevie Chatterjee MD  ANESTHESIA: General    ESTIMATED BLOOD LOSS: <5 cc  DRAINS/TUBES: 6 Algerian x 26cm double-J ureteral stent    IV FLUIDS: Please see dictated anesthesia record  COMPLICATIONS: None.   SPECIMEN: left ureteral stone for analysis  SIGNIFICANT FINDINGS: Impacted left ureteral stone.  Encrusted stent.    BRIEF OPERATIVE INDICATIONS:  Aubrey Duncan is a(n) 62 year old male who presented with 1.2 cm left proximal ureteral stone treated with stent placement.  After a discussion of all risks, benefits, and alternatives, the patient elected to proceed with definitive stone management with a ureteroscopic approach.    After induction of general anesthesia the patient was repositioned in dorsal lithotomy and prepped and draped in the standard sterile fashion.  A time out was performed confirming the appropriate patient identity and planned procedure.  The patient received culture directed antibiotics and had bilateral sequential compression devices for DVT propylaxis.    A 22-Algerian rigid cystoscope was inserted into a well-lubricated urethra. The urethra was unremarkable without stricture. The previous indwelling ureteral stent was identified and removed with the flexible stent grasper to the level of the urethral meatus. It was noted to be stuck in the proximal ureter at the level of he stone and would not release.  We subsequently passed a sensor wire adjacent to it into the left renal pelvis.  We passed a flexible ureteroscope over the wire adjacent to the stone and  under direct visualization used the 200 micron holmium laser at a setting of 0.8 J and 8 Hx to release the encrusted stent from the stone until it was able to be removed intact.    We performed a retrograde pyelogram to ensure no extravasation from this maneuver of which there was none.    Flexible URS   A 36 cm 12/14 ureteral access sheath was advanced up to the distal ureter. The flexible ureteroscope was used to identify the stone(s).  A 200 micron laser fiber was used at a setting of .8 J and 8 Hz and lithotripsy was performed in the ureter as the stone was too large and impacted to push into the kidney.  We fragmented half of it and removed the pieces with a tipless nitinol stone basket.  We then freed the stone and placed it into an upper pole dependent calyx where we used the laser to break the rest of it into basketable size fragments.  We removed all fragments > 2mm.  The stone appeared to be calcium oxalate.  Pullback ureteroscopy was performed and showed no retained stone fragments or significant ureteral injury    A 6Fr x 26-cm double-J stent was advanced over the Sensor wire, and a good proximal curl was seen in the renal pelvis on fluoroscopy and the distal curl was seen in the bladder. The bladder was drained.    The patient tolerated the procedure well and there were no apparent complications. The patient  was transported to the postanesthesia care unit in stable condition.     POSTOP PLAN:  -Remove stent via string in 5 days    Osvaldo Marquis

## 2017-11-09 NOTE — OR NURSING
Patient has met discharge criteria. Zully Burgos's wife stated Dr Marquis was going to send them home on an antibiotic. No antibiotic ordered. Physician notified, he will place an antibiotic order when possible

## 2017-11-13 LAB
APPEARANCE STONE: NORMAL
COMPN STONE: NORMAL
NUMBER STONE: NORMAL
SIZE STONE: NORMAL MM
WT STONE: 131 MG
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
## 2017-11-14 ENCOUNTER — CARE COORDINATION (OUTPATIENT)
Dept: UROLOGY | Facility: CLINIC | Age: 62
End: 2017-11-14

## 2017-11-14 NOTE — PROGRESS NOTES
Spoke with patient via telephone. He was able to remove his stent on a string without difficulty. Nikole Walsh RN

## 2017-11-24 ENCOUNTER — PRE VISIT (OUTPATIENT)
Dept: UROLOGY | Facility: CLINIC | Age: 62
End: 2017-11-24

## 2017-12-14 ENCOUNTER — TELEPHONE (OUTPATIENT)
Dept: WOUND CARE | Facility: CLINIC | Age: 62
End: 2017-12-14

## 2017-12-14 NOTE — TELEPHONE ENCOUNTER
----- Message from Kaleigh Tena sent at 12/14/2017  9:12 AM CST -----  Regarding: PT NEED A LEFT LEG WOUND APPT WITH DR. JOHNSON   Contact: [phone]  LILLY FROM [facility] CALLED TO SCHEDULE PT FOR A LEFT LEG WOUND APPT WITH DR. JOHNSON. PT WILL BE IN THE [facility] ON 12/19/2017. NEXT AVAILABLE 12/21, TOO FAR OUT. REFERRED BY DR. ADAMS. PT CAN BE REACHED [phone].    THANK YOU,  KALEIGH THORPE

## 2017-12-14 NOTE — TELEPHONE ENCOUNTER
Nurse Coordinator called patient to schedule appointment with Dr. Burkett.  Spoke with patient directly.  Since patient is already being seen at the [facility] for another appointment Dr. Burkett ok'd an overbook to see the patient in Ortho on 12/19.  Patient was scheduled at 1420 pm and explained where the Ortho clinic is located.

## 2017-12-15 NOTE — TELEPHONE ENCOUNTER
Records Received From: [facility] --- Care Everywhere    Date/Exam/Location  (specify location if different)   Office Notes: 10/2/17, 9/26/17, 9/19/17, 9/12/17, 8/17/17   ED/Hosp Notes: 10/17/17

## 2017-12-15 NOTE — TELEPHONE ENCOUNTER
APPT INFO    Date /Time: 12/19/17 2:20PM   Reason for Appt: Right Mid Calf Wound and Check Left Lower Calf Wound   Ref Provider/Clinic: Dr. Rocio Darling -- [facility]   Are there internal records? Yes/No?  IF YES, list clinic names: Yes - [facility]   Are there outside records? Yes/No? Yes -- see below   Patient Contact (Y/N) & Call Details: No - pt is referred   Action: Chart reviewed.  CareEverywhere records reviewed. See CareEverywhere Tab.       OUTSIDE RECORDS CHECKLIST     CLINIC NAME COMMENTS REC (x) IMG (x)   [facility] ** Records in Care Everywhere Tab x na

## 2017-12-19 ENCOUNTER — OFFICE VISIT (OUTPATIENT)
Dept: ORTHOPEDICS | Facility: CLINIC | Age: 62
End: 2017-12-19
Payer: MEDICARE

## 2017-12-19 ENCOUNTER — PRE VISIT (OUTPATIENT)
Dept: ORTHOPEDICS | Facility: CLINIC | Age: 62
End: 2017-12-19

## 2017-12-19 ENCOUNTER — RADIANT APPOINTMENT (OUTPATIENT)
Dept: CT IMAGING | Facility: CLINIC | Age: 62
End: 2017-12-19
Attending: UROLOGY
Payer: MEDICARE

## 2017-12-19 ENCOUNTER — OFFICE VISIT (OUTPATIENT)
Dept: UROLOGY | Facility: CLINIC | Age: 62
End: 2017-12-19
Payer: MEDICARE

## 2017-12-19 VITALS
WEIGHT: 158 LBS | HEART RATE: 78 BPM | BODY MASS INDEX: 22.62 KG/M2 | SYSTOLIC BLOOD PRESSURE: 128 MMHG | HEIGHT: 70 IN | DIASTOLIC BLOOD PRESSURE: 78 MMHG

## 2017-12-19 DIAGNOSIS — S81.811A LACERATION OF RIGHT LOWER EXTREMITY, INITIAL ENCOUNTER: Primary | ICD-10-CM

## 2017-12-19 DIAGNOSIS — N20.0 CALCULUS OF KIDNEY: ICD-10-CM

## 2017-12-19 DIAGNOSIS — N20.0 CALCULUS OF KIDNEY: Primary | ICD-10-CM

## 2017-12-19 LAB
ANION GAP SERPL CALCULATED.3IONS-SCNC: 7 MMOL/L (ref 3–14)
BUN SERPL-MCNC: 28 MG/DL (ref 7–30)
CALCIUM SERPL-MCNC: 9 MG/DL (ref 8.5–10.1)
CHLORIDE SERPL-SCNC: 106 MMOL/L (ref 94–109)
CO2 SERPL-SCNC: 25 MMOL/L (ref 20–32)
CREAT SERPL-MCNC: 1.03 MG/DL (ref 0.66–1.25)
GFR SERPL CREATININE-BSD FRML MDRD: 73 ML/MIN/1.7M2
GLUCOSE SERPL-MCNC: 200 MG/DL (ref 70–99)
PHOSPHATE SERPL-MCNC: 3.4 MG/DL (ref 2.5–4.5)
POTASSIUM SERPL-SCNC: 5.4 MMOL/L (ref 3.4–5.3)
PTH-INTACT SERPL-MCNC: 46 PG/ML (ref 12–72)
SODIUM SERPL-SCNC: 137 MMOL/L (ref 133–144)

## 2017-12-19 PROCEDURE — 83970 ASSAY OF PARATHORMONE: CPT | Performed by: UROLOGY

## 2017-12-19 PROCEDURE — 82306 VITAMIN D 25 HYDROXY: CPT | Performed by: UROLOGY

## 2017-12-19 RX ORDER — SILVER SULFADIAZINE 10 MG/G
CREAM TOPICAL
COMMUNITY
Start: 2017-09-12 | End: 2018-06-14

## 2017-12-19 RX ORDER — FOAM BANDAGE 2" X 2"
BANDAGE TOPICAL
COMMUNITY
Start: 2017-09-19 | End: 2018-06-14

## 2017-12-19 RX ORDER — CALCIUM ALGINATE 100 %
POWDER (GRAM) MISCELLANEOUS
COMMUNITY
Start: 2017-09-19 | End: 2018-06-14

## 2017-12-19 RX ORDER — SODIUM PHOSPHATE,MONO-DIBASIC 19G-7G/118
ENEMA (ML) RECTAL
COMMUNITY
Start: 2017-09-19 | End: 2018-06-14

## 2017-12-19 ASSESSMENT — PAIN SCALES - GENERAL: PAINLEVEL: NO PAIN (0)

## 2017-12-19 NOTE — MR AVS SNAPSHOT
After Visit Summary   12/19/2017    Aubrey Duncan    MRN: [id number]           Patient Information     Date Of Birth          1955        Visit Information        Provider Department      12/19/2017 2:20 PM Shaheed Burkett DPM [facility]        Today's Diagnoses     Laceration of right lower extremity, initial encounter    -  1       Follow-ups after your visit        Your next 10 appointments already scheduled     Feb 22, 2018  8:45 AM CST   Lab with  LAB    [facility] ([facility])    [address] [place] [address] [id number] [phone]            Feb 22, 2018  9:15 AM CST   (Arrive by 9:00 AM)   XR CHEST 2 VIEWS with XR1   [facility] Xray ([facility])    [address] [place] [address] [id number] [phone]           Please bring a list of your current medicines to your exam. (Include vitamins, minerals and over-thecounter medicines.) Leave your valuables at home.  Tell your doctor if there is a chance you may be pregnant.  You do not need to do anything special for this exam.            Feb 22, 2018  9:30 AM CST   DX HIP/PELVIS/SPINE with UCDX1   [facility] Dexa ([facility])    [address] [place] [address] [id number] [phone]           Please do not take any of the following 24 hours prior to the day of your exam: vitamins, calcium tablets, antacids.  If possible, please wear clothes without metal (snaps, zippers). A sweatsuit works well.            Feb 22, 2018 10:00 AM CST   SIX MINUTE WALK with  PFL B,  PFL 6 MINUTE WALK 2   [facility] Pulmonary Function Testing ([facility])    [address] [place] [address] [id number] [phone]            Feb 22, 2018 11:00 AM CST   (Arrive by 10:45 AM)   Return Lung Transplant with Kirk Broussard MD     [facility] Solid Organ Transplant ([facility])    [address] [facility] [id number] [phone]            Feb 22, 2018  1:00 PM CST   (Arrive by 12:45 PM)   Transplant Skin Check with SAMMI Luz MD   [facility] ([facility])    [address]  [address] [facility] [id number] [phone]              Who to contact     Please call your clinic at [phone] to:    Ask questions about your health    Make or cancel appointments    Discuss your medicines    Learn about your test results    Speak to your doctor   If you have compliments or concerns about an experience at your clinic, or if you wish to file a complaint, please contact [facility] Patient Relations at [phone] or email us at [email or web address]         Additional Information About Your Visit        [email or web address] Information     [employer] gives you secure access to your electronic health record. If you see a primary care provider, you can also send messages to your care team and make appointments. If you have questions, please call your primary care clinic.  If you do not have a primary care provider, please call [phone] and they will assist you.      [employer] is an electronic gateway that provides easy, online access to your medical records. With [employer], you can request a clinic appointment, read your test results, renew a prescription or communicate with your care team.     To access your existing account, please contact your [facility] Clinic or call [phone] for assistance.        Care EveryWhere ID     This is your Care EveryWhere ID. This could be used by other organizations to access your [place] medical records  [id number]         Blood Pressure from Last 3 Encounters:   12/19/17 128/78   11/09/17 142/80   10/18/17 174/90    Weight from Last 3 Encounters:   12/19/17 71.7 kg  (158 lb)   11/09/17 71.8 kg (158 lb 4.6 oz)   10/18/17 74.7 kg (164 lb 11.2 oz)              Today, you had the following     No orders found for display         Today's Medication Changes          These changes are accurate as of: 12/19/17 11:59 PM.  If you have any questions, ask your nurse or doctor.               These medicines have changed or have updated prescriptions.        Dose/Directions    sulfamethoxazole-trimethoprim 400-80 MG per tablet   Commonly known as:  BACTRIM/SEPTRA   This may have changed:  Another medication with the same name was removed. Continue taking this medication, and follow the directions you see here.   Used for:  Lung replaced by transplant (H)   Changed by:  Kirk Broussard MD        TAKE ONE TABLET BY MOUTH THREE TIMES A WEEK   Quantity:  12 tablet   Refills:  11       tacrolimus 1 MG capsule   Commonly known as:  GENERIC EQUIVALENT   This may have changed:  additional instructions   Used for:  Lung transplant status, bilateral (H)        Take 3 caps every am and 3 mg every pm.   Quantity:  180 capsule   Refills:  12         Stop taking these medicines if you haven't already. Please contact your care team if you have questions.     HYDROmorphone 4 MG tablet   Commonly known as:  DILAUDID   Stopped by:  Osvaldo Marquis MD           order for DME   Stopped by:  Osvaldo Marquis MD           phenazopyridine 200 MG tablet   Commonly known as:  PYRIDIUM   Stopped by:  Osvaldo Marquis MD           tamsulosin 0.4 MG capsule   Commonly known as:  FLOMAX   Stopped by:  Osvaldo Marquis MD                    Primary Care Provider    David Jiang MD       No address on file        Equal Access to Services     [facility] AH: Hadii mario anderson hadasho Soendy, waaxda luqadaha, qaybta kaalmada adeegyada, waxay ashley cedillo . So [facility] [phone].    ATENCIÓN: Si habla español, tiene a neely disposición servicios gratuitos de asistencia  lingüística. Alhaji al [phone].    We comply with applicable federal civil rights laws and [place] laws. We do not discriminate on the basis of race, color, national origin, age, disability, sex, sexual orientation, or gender identity.            Thank you!     Thank you for choosing [facility]  for your care. Our goal is always to provide you with excellent care. Hearing back from our patients is one way we can continue to improve our services. Please take a few minutes to complete the written survey that you may receive in the mail after your visit with us. Thank you!             Your Updated Medication List - Protect others around you: Learn how to safely use, store and throw away your medicines at www.disposemymeds.org.          This list is accurate as of: 12/19/17 11:59 PM.  Always use your most recent med list.                   Brand Name Dispense Instructions for use Diagnosis    albuterol 108 (90 BASE) MCG/ACT Inhaler    PROAIR HFA/PROVENTIL HFA/VENTOLIN HFA    3 Inhaler    Inhale 2 puffs into the lungs every 6 hours as needed for shortness of breath / dyspnea or wheezing    Wheezing       alendronate 70 MG tablet    FOSAMAX    4 tablet    Take 1 tablet (70 mg) by mouth every 7 days Take with 8 ounces water, at least 60 min before breakfast, and stay upright for at least 30 min after dose.    Osteopenia       ALLEVYN GENTLE BORDER LITE Pads           azaTHIOprine 50 MG tablet    IMURAN    15 tablet    Take 0.5 tablets (25 mg) by mouth daily    Lung replaced by transplant (H)       Calcium Alginate Powd           calcium-vitamin D 600-400 MG-UNIT per tablet    CALTRATE    60 tablet    Take 1 tablet by mouth 2 times daily (with meals)    Lung transplant status, bilateral (H)       Cotton Swabs Swab      As directed. Use to apply ointment with Daily dressing change        furosemide 20 MG tablet    LASIX    30 tablet    TAKE ONE TABLET BY MOUTH EVERY DAY    Hypertension       LISINOPRIL  PO      Take 5 mg by mouth daily        loperamide 2 MG capsule    IMODIUM    120 capsule    Take 1 capsule (2 mg) by mouth 4 times daily as needed for diarrhea    Lung transplant status, bilateral (H)       metoprolol 25 MG tablet    LOPRESSOR    60 tablet    TAKE ONE TABLET BY MOUTH TWICE A DAY    Hypertension       multivitamin, therapeutic Tabs tablet     30 tablet    Take 1 tablet by mouth daily    Lung transplant status, bilateral (H)       omeprazole 20 MG CR capsule    priLOSEC    60 capsule    Take 1 capsule (20 mg) by mouth 2 times daily (before meals)    Lung transplant status, bilateral (H)       predniSONE 5 MG tablet    DELTASONE    45 tablet    TAKE ONE TABLET BY MOUTH EVERY MORNING AND TAKE ONE-HALF TABLET BY MOUTH EVERY EVENING    Lung transplant status, bilateral (H)       sildenafil 25 MG tablet    VIAGRA    30 tablet    Take 1 tablet (25 mg) by mouth as needed for erectile dysfunction    ED (erectile dysfunction)       silver sulfADIAZINE 1 % cream    SILVADENE     Apply twice daily with dressing changes until healed        sulfamethoxazole-trimethoprim 400-80 MG per tablet    BACTRIM/SEPTRA    12 tablet    TAKE ONE TABLET BY MOUTH THREE TIMES A WEEK    Lung replaced by transplant (H)       tacrolimus 1 MG capsule    GENERIC EQUIVALENT    180 capsule    Take 3 caps every am and 3 mg every pm.    Lung transplant status, bilateral (H)       valGANciclovir 450 MG tablet    VALCYTE    60 tablet    TAKE ONE TABLETS (450MG) BY MOUTH TWO TIMES A DAY    CMV (cytomegalovirus infection) (H), Lung replaced by transplant (H)

## 2017-12-19 NOTE — PATIENT INSTRUCTIONS
Schedule CT today.      It was a pleasure meeting with you today.  Thank you for allowing me and my team the privilege of caring for you today.  YOU are the reason we are here, and I truly hope we provided you with the excellent service you deserve.  Please let us know if there is anything else we can do for you so that we can be sure you are leaving completely satisfied with your care experience.

## 2017-12-19 NOTE — NURSING NOTE
"Chief Complaint   Patient presents with     Surgical Followup     post operative check up S/P ureteral stone removal surgery       Blood pressure 128/78, pulse 78, height 1.778 m (5' 10\"), weight 71.7 kg (158 lb). Body mass index is 22.67 kg/(m^2).    Patient Active Problem List   Diagnosis     Alpha-1-antitrypsin deficiency (H)     Lung transplant status, bilateral (H)     Acute postoperative pain     Constipation due to pain medication     Encounter for long-term current use of medication     Heparin-induced thrombocytopenia (H)     DVT of upper extremity (deep vein thrombosis) (H)     Steroid-induced diabetes mellitus (H)     CMV infection, acute (H)     Prophylactic antibiotic     Wound infection     Wound of lower extremity     Ureteral stone       Allergies   Allergen Reactions     Heparin Cross Reactors Other (See Comments)     HIT positive and AUGUST positive      Oxycodone Other (See Comments)     Significant lethargy, confusion. Tolerates dilaudid well.      Fluocinolone Unknown     Tendon problems     Levaquin      Pneumococcal Vaccine        Current Outpatient Prescriptions   Medication Sig Dispense Refill     Calcium Alginate POWD        Wound Dressings (ALLEVYN GENTLE BORDER LITE) PADS        silver sulfADIAZINE (SILVADENE) 1 % cream Apply twice daily with dressing changes until healed       Applicators (COTTON SWABS) SWAB As directed. Use to apply ointment with Daily dressing change       alendronate (FOSAMAX) 70 MG tablet Take 1 tablet (70 mg) by mouth every 7 days Take with 8 ounces water, at least 60 min before breakfast, and stay upright for at least 30 min after dose. 4 tablet 11     LISINOPRIL PO Take 5 mg by mouth daily       sulfamethoxazole-trimethoprim (BACTRIM/SEPTRA) 400-80 MG per tablet TAKE ONE TABLET BY MOUTH THREE TIMES A WEEK 12 tablet 11     predniSONE (DELTASONE) 5 MG tablet TAKE ONE TABLET BY MOUTH EVERY MORNING AND TAKE ONE-HALF TABLET BY MOUTH EVERY EVENING 45 tablet 12     " azaTHIOprine (IMURAN) 50 MG tablet Take 0.5 tablets (25 mg) by mouth daily 15 tablet 11     tacrolimus (PROGRAF - GENERIC EQUIVALENT) 1 MG capsule Take 3 caps every am and 3 mg every pm. (Patient taking differently: Take 3 caps every am and 3 caps every pm.) 180 capsule 12     metoprolol (LOPRESSOR) 25 MG tablet TAKE ONE TABLET BY MOUTH TWICE A DAY 60 tablet 12     furosemide (LASIX) 20 MG tablet TAKE ONE TABLET BY MOUTH EVERY DAY 30 tablet 11     albuterol (PROAIR HFA, PROVENTIL HFA, VENTOLIN HFA) 108 (90 BASE) MCG/ACT inhaler Inhale 2 puffs into the lungs every 6 hours as needed for shortness of breath / dyspnea or wheezing 3 Inhaler 11     sildenafil (VIAGRA) 25 MG tablet Take 1 tablet (25 mg) by mouth as needed for erectile dysfunction 30 tablet 0     valGANciclovir (VALCYTE) 450 MG tablet TAKE ONE TABLETS (450MG) BY MOUTH TWO TIMES A DAY 60 tablet 11     loperamide (IMODIUM) 2 MG capsule Take 1 capsule (2 mg) by mouth 4 times daily as needed for diarrhea 120 capsule 12     omeprazole (PRILOSEC) 20 MG capsule Take 1 capsule (20 mg) by mouth 2 times daily (before meals) 60 capsule 12     calcium-vitamin D (CALTRATE) 600-400 MG-UNIT per tablet Take 1 tablet by mouth 2 times daily (with meals) 60 tablet 12     multivitamin, therapeutic (THERA-VIT) TABS Take 1 tablet by mouth daily 30 tablet 12       Social History   Substance Use Topics     Smoking status: Former Smoker     Packs/day: 2.00     Years: 15.00     Types: Cigarettes     Quit date: 1/1/1986     Smokeless tobacco: Never Used     Alcohol use No       Mei Harrell LPN  12/19/2017  4:08 PM
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
## 2017-12-19 NOTE — MR AVS SNAPSHOT
After Visit Summary   12/19/2017    Aubrey Duncan    MRN: [id number]           Patient Information     Date Of Birth          1955        Visit Information        Provider Department      12/19/2017 3:30 PM Osvaldo Marquis MD [facility]        Today's Diagnoses     Calculus of kidney    -  1      Care Instructions    Schedule CT today.      It was a pleasure meeting with you today.  Thank you for allowing me and my team the privilege of caring for you today.  YOU are the reason we are here, and I truly hope we provided you with the excellent service you deserve.  Please let us know if there is anything else we can do for you so that we can be sure you are leaving completely satisfied with your care experience.                  Follow-ups after your visit        Your next 10 appointments already scheduled     Dec 19, 2017  4:45 PM CST   LAB with  LAB   [facility] Lab ([facility] [place])    [address] [place] [address] [id number] [phone]           Please do not eat 10-12 hours before your appointment if you are coming in fasting for labs on lipids, cholesterol, or glucose (sugar). This does not apply to pregnant women. Water, hot tea and black coffee (with nothing added) are okay. Do not drink other fluids, diet soda or chew gum.            Dec 19, 2017  8:00 PM CST   CT ABDOMEN PELVIS W/O CONTRAST with UCCT1   [facility] Imaging Center CT ([facility] [place])    [address] [place] [address] [id number] [phone]           Please bring any scans or X-rays taken at other hospitals, if similar tests were done. Also bring a list of your medicines, including vitamins, minerals and over-the-counter drugs. It is safest to leave personal items at home.  Be sure to tell your doctor:   If you have any allergies.   If there s any chance you are pregnant.   If you are  breastfeeding.   If you have any special needs.  You may have contrast for this exam. To prepare:   Do not eat or drink for 2 hours before your exam. If you need to take medicine, you may take it with small sips of water. (We may ask you to take liquid medicine as well.)   The day before your exam, drink extra fluids at least six 8-ounce glasses (unless your doctor tells you to restrict your fluids).  Patients over 70 or patients with diabetes or kidney problems:   If you haven t had a blood test (creatinine test) within the last 30 days, go to your clinic or Diagnostic Imaging Department for this test.  If you have diabetes:   If your kidney function is normal, continue taking your metformin (Avandamet, Glucophage, Glucovance, Metaglip) on the day of your exam.   If your kidney function is abnormal, wait 48 hours before restarting this medicine.  You will have oral contrast for this exam:   You will drink the contrast at home. Get this from your clinic or Diagnostic Imaging Department. Please follow the directions given.  Please wear loose clothing, such as a sweat suit or jogging clothes. Avoid snaps, zippers and other metal. We may ask you to undress and put on a hospital gown.  If you have any questions, please call the Imaging Department where you will have your exam.            Feb 22, 2018  8:45 AM CST   Lab with  LAB   [facility] Lab ([facility])    [address] [place] [id number] [phone]            Feb 22, 2018  9:15 AM CST   (Arrive by 9:00 AM)   XR CHEST 2 VIEWS with UCXR1   [facility] Xray ([facility])    [address] [place] [address] [id number] [phone]           Please bring a list of your current medicines to your exam. (Include vitamins, minerals and over-thecounter medicines.) Leave your valuables at home.  Tell your doctor if there is a chance you may be pregnant.  You do not  need to do anything special for this exam.            Feb 22, 2018  9:30 AM CST   DX HIP/PELVIS/SPINE with UCDX1   [facility] Imaging Center Dexa ([facility])    [address] [phone] [phone]           Please do not take any of the following 24 hours prior to the day of your exam: vitamins, calcium tablets, antacids.  If possible, please wear clothes without metal (snaps, zippers). A sweatsuit works well.            Feb 22, 2018 10:00 AM CST   SIX MINUTE WALK with  PFL B,  PFL 6 MINUTE WALK 2   [facility] Pulmonary Function Testing ([facility])    [address] [place] [address] [id number] [phone]            Feb 22, 2018 11:00 AM CST   (Arrive by 10:45 AM)   Return Lung Transplant with Kirk Broussard MD   [facility] Solid Organ Transplant ([facility])    [address] [place] [address] [id number] [phone]            Feb 22, 2018  1:00 PM CST   (Arrive by 12:45 PM)   Transplant Skin Check with SAMMI Luz MD   [facility] Dermatology ([facility])    [address] [place] [address] [id number] [phone]              Future tests that were ordered for you today     Open Future Orders        Priority Expected Expires Ordered    Vitamin D deficiency screening Routine  12/20/2018 12/19/2017    CT Abdomen pelvis w/o contrast Routine  12/19/2018 12/19/2017    Basic metabolic panel Routine  12/19/2018 12/19/2017    Phosphorus Routine  12/19/2018 12/19/2017    Parathyroid Hormone Intact Routine  12/19/2018 12/19/2017            Who to contact     Please call your clinic at [phone] to:    Ask questions about your health    Make or cancel appointments    Discuss your medicines    Learn about your test results    Speak to your doctor   If you have compliments or concerns about an experience at your clinic, or  "if you wish to file a complaint, please contact [facility] Patient Relations at [phone] or email us at [email or web address]         Additional Information About Your Visit        [employer] Information     [employer] gives you secure access to your electronic health record. If you see a primary care provider, you can also send messages to your care team and make appointments. If you have questions, please call your primary care clinic.  If you do not have a primary care provider, please call [phone] and they will assist you.      [employer] is an electronic gateway that provides easy, online access to your medical records. With [employer], you can request a clinic appointment, read your test results, renew a prescription or communicate with your care team.     To access your existing account, please contact your [facility] or call [phone] for assistance.        Care EveryWhere ID     This is your Care EveryWhere ID. This could be used by other organizations to access your [place] medical records  [id number]        Your Vitals Were     Pulse Height BMI (Body Mass Index)             78 1.778 m (5' 10\") 22.67 kg/m2          Blood Pressure from Last 3 Encounters:   12/19/17 128/78   11/09/17 142/80   10/18/17 174/90    Weight from Last 3 Encounters:   12/19/17 71.7 kg (158 lb)   11/09/17 71.8 kg (158 lb 4.6 oz)   10/18/17 74.7 kg (164 lb 11.2 oz)              We Performed the Following     [employer]: Clinic Lab Order          Today's Medication Changes          These changes are accurate as of: 12/19/17  4:36 PM.  If you have any questions, ask your nurse or doctor.               These medicines have changed or have updated prescriptions.        Dose/Directions    sulfamethoxazole-trimethoprim 400-80 MG per tablet   Commonly known as:  BACTRIM/SEPTRA   This may have changed:  Another medication with the same name was removed. Continue taking this " medication, and follow the directions you see here.   Used for:  Lung replaced by transplant (H)   Changed by:  Kirk Broussard MD        TAKE ONE TABLET BY MOUTH THREE TIMES A WEEK   Quantity:  12 tablet   Refills:  11       tacrolimus 1 MG capsule   Commonly known as:  GENERIC EQUIVALENT   This may have changed:  additional instructions   Used for:  Lung transplant status, bilateral (H)        Take 3 caps every am and 3 mg every pm.   Quantity:  180 capsule   Refills:  12         Stop taking these medicines if you haven't already. Please contact your care team if you have questions.     HYDROmorphone 4 MG tablet   Commonly known as:  DILAUDID   Stopped by:  Osvaldo Marquis MD           order for DME   Stopped by:  Osvaldo Marquis MD           phenazopyridine 200 MG tablet   Commonly known as:  PYRIDIUM   Stopped by:  Osvaldo Marquis MD           tamsulosin 0.4 MG capsule   Commonly known as:  FLOMAX   Stopped by:  Osvaldo Marquis MD                    Primary Care Provider    David Jiang MD       No address on file        Equal Access to Services     [facility]: Hadii aad ku hadasho Soomaali, waaxda luqadaha, qaybta kaalmada adeegyada, waxay idiin hayaddin blake cedillo . So [facility] [phone].    ATENCIÓN: Si habla español, tiene a neely disposición servicios gratuitos de asistencia lingüística. [facility] [phone].    We comply with applicable federal civil rights laws and [place] laws. We do not discriminate on the basis of race, color, national origin, age, disability, sex, sexual orientation, or gender identity.            Thank you!     Thank you for choosing [facility] UROLOGY AND [facility] FOR PROSTATE AND UROLOGIC CANCERS  for your care. Our goal is always to provide you with excellent care. Hearing back from our patients is one way we can continue to improve our services. Please take a few minutes to complete the written survey that you may receive in the mail  after your visit with us. Thank you!             Your Updated Medication List - Protect others around you: Learn how to safely use, store and throw away your medicines at www.disposemymeds.org.          This list is accurate as of: 12/19/17  4:36 PM.  Always use your most recent med list.                   Brand Name Dispense Instructions for use Diagnosis    albuterol 108 (90 BASE) MCG/ACT Inhaler    PROAIR HFA/PROVENTIL HFA/VENTOLIN HFA    3 Inhaler    Inhale 2 puffs into the lungs every 6 hours as needed for shortness of breath / dyspnea or wheezing    Wheezing       alendronate 70 MG tablet    FOSAMAX    4 tablet    Take 1 tablet (70 mg) by mouth every 7 days Take with 8 ounces water, at least 60 min before breakfast, and stay upright for at least 30 min after dose.    Osteopenia       ALLEVYN GENTLE BORDER LITE Pads           azaTHIOprine 50 MG tablet    IMURAN    15 tablet    Take 0.5 tablets (25 mg) by mouth daily    Lung replaced by transplant (H)       Calcium Alginate Powd           calcium-vitamin D 600-400 MG-UNIT per tablet    CALTRATE    60 tablet    Take 1 tablet by mouth 2 times daily (with meals)    Lung transplant status, bilateral (H)       Cotton Swabs Swab      As directed. Use to apply ointment with Daily dressing change        furosemide 20 MG tablet    LASIX    30 tablet    TAKE ONE TABLET BY MOUTH EVERY DAY    Hypertension       LISINOPRIL PO      Take 5 mg by mouth daily        loperamide 2 MG capsule    IMODIUM    120 capsule    Take 1 capsule (2 mg) by mouth 4 times daily as needed for diarrhea    Lung transplant status, bilateral (H)       metoprolol 25 MG tablet    LOPRESSOR    60 tablet    TAKE ONE TABLET BY MOUTH TWICE A DAY    Hypertension       multivitamin, therapeutic Tabs tablet     30 tablet    Take 1 tablet by mouth daily    Lung transplant status, bilateral (H)       omeprazole 20 MG CR capsule    priLOSEC    60 capsule    Take 1 capsule (20 mg) by mouth 2 times daily (before  meals)    Lung transplant status, bilateral (H)       predniSONE 5 MG tablet    DELTASONE    45 tablet    TAKE ONE TABLET BY MOUTH EVERY MORNING AND TAKE ONE-HALF TABLET BY MOUTH EVERY EVENING    Lung transplant status, bilateral (H)       sildenafil 25 MG tablet    VIAGRA    30 tablet    Take 1 tablet (25 mg) by mouth as needed for erectile dysfunction    ED (erectile dysfunction)       silver sulfADIAZINE 1 % cream    SILVADENE     Apply twice daily with dressing changes until healed        sulfamethoxazole-trimethoprim 400-80 MG per tablet    BACTRIM/SEPTRA    12 tablet    TAKE ONE TABLET BY MOUTH THREE TIMES A WEEK    Lung replaced by transplant (H)       tacrolimus 1 MG capsule    GENERIC EQUIVALENT    180 capsule    Take 3 caps every am and 3 mg every pm.    Lung transplant status, bilateral (H)       valGANciclovir 450 MG tablet    VALCYTE    60 tablet    TAKE ONE TABLETS (450MG) BY MOUTH TWO TIMES A DAY    CMV (cytomegalovirus infection) (H), Lung replaced by transplant (H)

## 2017-12-19 NOTE — NURSING NOTE
Reason For Visit:   Chief Complaint   Patient presents with     Consult     Wound, right mid calf. Pt stated that he has had this wound for about a week. Pt stated that this wound is due to an injury. Pt has been cleaning the wound with microklenz, calcium alginate dressing and non stick pads. Pt is currently taking augmentin.         Pain Assessment  Patient Currently in Pain: Denies (Pt stated that he has intermittent pain in his right leg when he stretches. )         Current Outpatient Prescriptions   Medication Sig Dispense Refill     HYDROmorphone (DILAUDID) 4 MG tablet Take 1 tablet (4 mg) by mouth every 6 hours as needed for moderate to severe pain 10 tablet 0     phenazopyridine (PYRIDIUM) 200 MG tablet Take 1 tablet (200 mg) by mouth 3 times daily as needed for irritation 9 tablet 0     sulfamethoxazole-trimethoprim (BACTRIM DS/SEPTRA DS) 800-160 MG per tablet Take 1 tablet by mouth daily 5 tablet 0     tamsulosin (FLOMAX) 0.4 MG capsule Take 1 capsule (0.4 mg) by mouth daily 30 capsule 1     alendronate (FOSAMAX) 70 MG tablet Take 1 tablet (70 mg) by mouth every 7 days Take with 8 ounces water, at least 60 min before breakfast, and stay upright for at least 30 min after dose. 4 tablet 11     LISINOPRIL PO Take 5 mg by mouth daily       order for DME Equipment being ordered: Polymem Non-Adhesive Pad  Treatment Diagnosis: Wound infection 30 pad 1     sulfamethoxazole-trimethoprim (BACTRIM/SEPTRA) 400-80 MG per tablet TAKE ONE TABLET BY MOUTH THREE TIMES A WEEK 12 tablet 11     predniSONE (DELTASONE) 5 MG tablet TAKE ONE TABLET BY MOUTH EVERY MORNING AND TAKE ONE-HALF TABLET BY MOUTH EVERY EVENING 45 tablet 12     azaTHIOprine (IMURAN) 50 MG tablet Take 0.5 tablets (25 mg) by mouth daily 15 tablet 11     tacrolimus (PROGRAF - GENERIC EQUIVALENT) 1 MG capsule Take 3 caps every am and 3 mg every pm. (Patient taking differently: Take 3 caps every am and 3 caps every pm.) 180 capsule 12     metoprolol (LOPRESSOR)  25 MG tablet TAKE ONE TABLET BY MOUTH TWICE A DAY 60 tablet 12     furosemide (LASIX) 20 MG tablet TAKE ONE TABLET BY MOUTH EVERY DAY 30 tablet 11     albuterol (PROAIR HFA, PROVENTIL HFA, VENTOLIN HFA) 108 (90 BASE) MCG/ACT inhaler Inhale 2 puffs into the lungs every 6 hours as needed for shortness of breath / dyspnea or wheezing 3 Inhaler 11     sildenafil (VIAGRA) 25 MG tablet Take 1 tablet (25 mg) by mouth as needed for erectile dysfunction 30 tablet 0     valGANciclovir (VALCYTE) 450 MG tablet TAKE ONE TABLETS (450MG) BY MOUTH TWO TIMES A DAY 60 tablet 11     loperamide (IMODIUM) 2 MG capsule Take 1 capsule (2 mg) by mouth 4 times daily as needed for diarrhea 120 capsule 12     omeprazole (PRILOSEC) 20 MG capsule Take 1 capsule (20 mg) by mouth 2 times daily (before meals) 60 capsule 12     calcium-vitamin D (CALTRATE) 600-400 MG-UNIT per tablet Take 1 tablet by mouth 2 times daily (with meals) 60 tablet 12     multivitamin, therapeutic (THERA-VIT) TABS Take 1 tablet by mouth daily 30 tablet 12          Allergies   Allergen Reactions     Heparin Cross Reactors Other (See Comments)     HIT positive and AUGUST positive      Oxycodone Other (See Comments)     Significant lethargy, confusion. Tolerates dilaudid well.      Fluocinolone Unknown     Tendon problems     Levaquin      Pneumococcal Vaccine
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
## 2017-12-20 DIAGNOSIS — Z94.2 LUNG REPLACED BY TRANSPLANT (H): ICD-10-CM

## 2017-12-20 LAB — DEPRECATED CALCIDIOL+CALCIFEROL SERPL-MC: 34 UG/L (ref 20–75)

## 2017-12-20 NOTE — TELEPHONE ENCOUNTER
Drug Name: azathioprine 50mg  Last Fill Date: 11/28/17  Quantity: 15    Savanah Richey   [facility]  [phone]

## 2017-12-21 RX ORDER — AZATHIOPRINE 50 MG/1
25 TABLET ORAL DAILY
Qty: 15 TABLET | Refills: 11 | Status: SHIPPED | OUTPATIENT
Start: 2017-12-21 | End: 2018-12-04

## 2017-12-27 NOTE — PROGRESS NOTES
Patient Information     Patient Name MRN Sex Aubrey Rizo [id number] Male 1955      Progress Notes by Rocio Darling NP at 10/16/2017  4:20 AM     Author:  Rocio Darling NP Service:  (none) Author Type:  PHYS- Nurse Practitioner     Filed:  10/17/2017 12:38 PM Encounter Date:  10/16/2017 Status:  Signed     :  Rocio Darling NP (PHYS- Nurse Practitioner)            This note has been dictated. The encounter number is [id number].

## 2017-12-27 NOTE — PROGRESS NOTES
Patient Information     Patient Name MRN Sex Aubrey Rizo [id number] Male 1955      Progress Notes by David Jiang MD at 2017  9:30 AM     Author:  David Jiang MD Service:  (none) Author Type:  Physician     Filed:  2017 10:20 AM Encounter Date:  2017 Status:  Signed     :  David Jiang MD (Physician)            Subjective  Aubrey Duncan is a 62 y.o. male who presents for sore on left leg. On 2017 he was working in his garage when he dropped a treated 2 x 6 off the sawhorse which impacted his left leg. He developed a skin tear. Subsequently the torn skin sloughed off. He saw Dr. Cohen on , and was told to use Vaseline to keep it moist. Since then he's been getting a little more discomfort superiorly and in between the 2 laceration areas. It was developing a black eschar which has sloughed off as well. He's had scant drainage. He has a history of alpha-1 antitrypsin deficiency and continues on immunosuppressive therapy with Prograf and Imuran status post lung transplant.    Problem List/PMH: reviewed in EMR, and made relevant updates today.  Medications: reviewed in EMR, and made relevant updates today.  Allergies: reviewed in EMR, and made relevant updates today.    Social Hx:  Social History       Substance Use Topics         Smoking status:   Former Smoker     Packs/day:  3.50     Years:  17.00     Types:  Cigarettes     Quit date:  1987     Smokeless tobacco:   Never Used      Comment: had lung transplant in 2013      Alcohol use   No     Social History Narrative    He is disabled from his COPD. - Lung transplant     [place]    where his wife works as a dental hygienist.    Kyung - Wife                        I reviewed social history and made relevant updates today.    Family Hx:   Family History       Problem   Relation Age of Onset     Other  Mother       "Alpha 1 deficiency heterozygote likely;       Heart Disease  Mother      CHF       Other  Father      Emphysema       Heart Disease  Father      GI Disease  Sister      Kidney Failure       Other  Sister      ALS       COPD  Sister      alpha 1 antitrypsin       Other  Sister      x2 Alpha 1 deficient         Other  Sister      Lupus       Other  Sister      Fibromyalgia       Other  Brother      Factor V Leiden mutation       Other  Paternal Grandfather      Emphysema       Other  Daughter      M2Z heterozygote for alpha 1 deficiency         Objective  Vitals: reviewed in EMR.  /86  Pulse 68  Temp 97.9  F (36.6  C) (Tympanic)   Ht 1.702 m (5' 7\")  Wt 78.8 kg (173 lb 12.8 oz)  BMI 27.22 kg/m2    Gen: Pleasant male, NAD.  HEENT: MMM  Neck: Supple  Pulm: Breathing easily  Neuro: Grossly intact  Skin: Triangular shaped abrasion on the left lateral calf measuring 5 x 5 cm at the greatest diameter. There is another similar but smaller lesion more laterally that measures 5 cm x 1 cm. There is slight surrounding erythema and scant drainage with tenderness to palpation around the areas.  Psychiatric: Normal affect and insight. Does not appear anxious or depressed.        Assessment    ICD-10-CM    1. Abrasion of leg, left, subsequent encounter S80.812D silver sulfADIAZINE (SILVADENE) 1 % cream      Non-Adherent Bandage 5 X 9 \" bndg      Gauze Bandage (KERLIX) 3.4 X 3.6 \"-yard bndg      AMB CONSULT TO WOUND CLINIC      WOUND CULTURE, STAIN      amoxicillin-clavulanate 875-125 mg tablet (AUGMENTIN)      WOUND CULTURE, STAIN   2. S/P lung transplant (HC) Z94.2 silver sulfADIAZINE (SILVADENE) 1 % cream      Non-Adherent Bandage 5 X 9 \" bndg      Gauze Bandage (KERLIX) 3.4 X 3.6 \"-yard bndg      AMB CONSULT TO WOUND CLINIC      WOUND CULTURE, STAIN      amoxicillin-clavulanate 875-125 mg tablet (AUGMENTIN)      WOUND CULTURE, STAIN   3. Immunosuppressed status (HC) D89.9 silver sulfADIAZINE (SILVADENE) 1 % cream      " "Non-Adherent Bandage 5 X 9 \" bndg      Gauze Bandage (KERLIX) 3.4 X 3.6 \"-yard bndg      AMB CONSULT TO WOUND CLINIC      WOUND CULTURE, STAIN      amoxicillin-clavulanate 875-125 mg tablet (AUGMENTIN)      WOUND CULTURE, STAIN       Plan   -- Expected clinical course discussed   -- Medications and their side effects discussed  Patient Instructions    -- Twice daily dressing changes with silvadene, adaptic then kerlix   -- Augmentin   -- Eat yogurt 1-2 times per day while on antibiotics (and for a few weeks after) to reduce the chances of diarrhea   -- Wound consult with Cora   -- Return if worse    Return if symptoms worsen or fail to improve.    Signed, David Jiang MD  Internal Medicine & Pediatrics            "

## 2017-12-27 NOTE — PROGRESS NOTES
Patient Information     Patient Name MRN Sex Aubrey Rizo [id number] Male 1955      Progress Notes by Rocio Darling NP at 2017 10:40 AM     Author:  Rocio Darling NP Service:  (none) Author Type:  PHYS- Nurse Practitioner     Filed:  2017 12:26 PM Encounter Date:  2017 Status:  Signed     :  Rocio Darling NP (PHYS- Nurse Practitioner)            SUBJECTIVE:    Aubrey Duncan is a 62 y.o. male who presents for a wound consult as requested by Dr. Jiang.    HPI  patient injured his leg on . He dropped a wooden sawhorse on the leg and also a nail. He developed to abrasions. He was seen in clinic the following day and the wounds were treated with Vaseline. He and his wife noticed that they had developed some darker tissue and also some redness around the wounds and was more painful so saw primary doctor last week who did a wound culture that was negative but started him on Augmentin. He has been taking this twice daily. He was prescribed this medication for 10 days. His wound care treatment plan was also switched to applying Silvadene cream followed by Adaptic and gauze wrap and changing twice daily. They have been doing this and noticed that the yellow tissue in the wound bed continues but maybe is a little less than previous. He reports the pain around the wound, redness and swelling have improved. He has chronic edema of the lower extremities. He does not usually wear compression stockings.  He is immunosuppressed after lung transplant in . He last saw transplant team in April and usually has follow-up every 6 months. He also has type 2 diabetes with A1c in  showing excellent control. He states the sugars continue to be well controlled. He follows a well-balanced diet and has no nutritional deficiencies. He eats protein and is not on any special diet besides a diabetic diet.  He has been afebrile and has not noticed any purulent or odorous  "drainage from the wound. He does have a remote history of DVT.  Allergies      Allergen   Reactions     Heparin (Bovine)  Thrombocytopenia and Thromboembolic Event     Ciprocinonide  Other - Describe In Comment Field     Tendon problems      Levaquin [Levofloxacin]  Arthralgia     Knee pain; flare of gouty arthritis      Oxycodone  Hallucinations     Pneumovax 23 [Pneumococcal Vaccine]  Fever and Other - Describe In Comment Field     \"My arm swelled up like a balloon.\"    ,   Family History       Problem   Relation Age of Onset     Other  Mother      Alpha 1 deficiency heterozygote likely;       Heart Disease  Mother      CHF       Other  Father      Emphysema       Heart Disease  Father      GI Disease  Sister      Kidney Failure       Other  Sister      ALS       COPD  Sister      alpha 1 antitrypsin       Other  Sister      x2 Alpha 1 deficient         Other  Sister      Lupus       Other  Sister      Fibromyalgia       Other  Brother      Factor V Leiden mutation       Other  Paternal Grandfather      Emphysema       Other  Daughter      M2Z heterozygote for alpha 1 deficiency     ,   Current Outpatient Prescriptions on File Prior to Visit       Medication  Sig Dispense Refill     albuterol HFA (PROAIR HFA) 90 mcg/actuation inhaler Inhale 2 Puffs by mouth 4 times daily if needed. 2 Inhaler 3     alendronate (FOSAMAX) 70 mg tablet Take 1 tablet by mouth once a week in the morning. Take on empty stomach with full glass of water. Do not lie down for 1 hr. 13 tablet 3     amoxicillin-clavulanate 875-125 mg tablet (AUGMENTIN) Take 1 tablet by mouth 2 times daily with meals for 10 days. 20 tablet 0     azaTHIOprine (IMURAN) 50 mg tablet Take 0.5 tablets by mouth once daily.  1     calcium carbonate-vitamin D3, 600 mg-400 unit, (CALCIUM 600 + D) tablet Take 1 tablet by mouth 2 times daily with meals.  0     ferrous sulfate 325 mg delayed release tablet Take 1 tablet by mouth once daily.  0     furosemide (LASIX) 20 mg " "tablet Take 1 tablet by mouth once daily. 90 tablet 4     Gauze Bandage (KERLIX) 3.4 X 3.6 \"-yard bndg Use BID with dressing changes until healed 96 Each 1     lisinopril (PRINIVIL; ZESTRIL) 5 mg tablet Take 1 tablet by mouth once daily in the evening. 90 tablet 4     loperamide (IMODIUM A-D) 2 mg tablet Take 4mg by mouth with 1st loose stool, then 2mg with each subsequent loose stool. Max 16 mg in 24 hrs  0     metoprolol tartrate (LOPRESSOR) 25 mg tablet Take 1 tablet by mouth 2 times daily. 180 tablet 4     multivitamin (MVI) tablet Take 1 tablet by mouth once daily.  0     Non-Adherent Bandage 5 X 9 \" bndg Use BID with dressing changes until healed. 100 Each 1     omeprazole (PRILOSEC) 20 mg Delayed-Release capsule TAKE 1 CAPSULE BY MOUTH 2 TIMES A DAY BEFORE MEALS 180 capsule 2     predniSONE (DELTASONE) 5 mg tablet Take one tablet by mouth in the morning and one-half tablet every evening.  Take with food.  0     silver sulfADIAZINE (SILVADENE) 1 % cream Apply twice daily with dressing changes until healed 85 g 1     tacrolimus (PROGRAF) 0.5 mg capsule Take 1 capsule by mouth every 12 hours. Take along with 2 of the 1 mg capsules to equal 2.5 mg BID  0     tacrolimus (PROGRAF) 1 mg capsule Take 2 capsules by mouth every 12 hours. Take along with 0.5 mg capsules to equal 2.5 mg BID  0     trimethoprim-sulfamethoxazole,  mg, (BACTRIM; SEPTRA) per tablet Take three times per week.  0     valGANciclovir (VALCYTE) 450 mg tablet        No current facility-administered medications on file prior to visit.    ,   Past Medical History:     Diagnosis  Date     Alpha-1-antitrypsin deficiency (HC)     Alpha 1 antitrypsin deficiency      CMV (cytomegalovirus infection) (HC)     donor-related      COPD      CORONARY ARTERY DISEASE 7/1/2002    angiography showing focal disease      DM2 (diabetes mellitus, type 2) (HC)     steroid-induced      GERD      GOUT      heterozygosity 1955    Heterozygosity for prothrombin " "[id number], slightly increased risk for coagulation      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C)     after transplant      HYPERTENSION      Lung transplant status, bilateral (HC) 9/8/2013    [facility]    ,   Past Surgical History:      Procedure  Laterality Date     BRONCHOSCOPY  2013    multiple       COLONOSCOPY SCREENING  2003    Normal, f/u 10 years       CVL CORONARY ANGIOGRAM  2002    Coronary angiography       Hx EGD  11/6/2013    balloon dilation       HX LIVER BIOPSY  3/18/2015    alpha-1 AT affected w/loss of ducts       Hx lung transplant Bilateral 9/8/2013    [facility]       IRIDOTOMY/IRIDECTOMY      Left       TONSILLECTOMY  Childhold     and   Social History       Substance Use Topics         Smoking status:   Former Smoker     Packs/day:  3.50     Years:  17.00     Types:  Cigarettes     Quit date:  7/1/1987     Smokeless tobacco:   Never Used      Comment: had lung transplant in Sept 2013      Alcohol use   No       REVIEW OF SYSTEMS:  Review of Systems   Constitutional: Negative for chills and fever.   Respiratory: Negative for cough and shortness of breath.    Cardiovascular: Positive for leg swelling. Negative for orthopnea and PND.   Musculoskeletal:        See history of present illness, denies bone pain left leg   Skin:        See history of present illness   Neurological: Negative for sensory change.   Endo/Heme/Allergies: Negative for polydipsia.   Psychiatric/Behavioral: Negative for substance abuse.       OBJECTIVE:  /82  Temp 96.4  F (35.8  C) (Tympanic)  Ht 1.702 m (5' 7\")  Wt 78.5 kg (173 lb)  BMI 27.1 kg/m2    EXAM:   Physical Exam  pleasant woman without acute distress. His wife accompanies him to this appointment. She states she has been doing his dressing changes. Alert and oriented ×4. Skin color pink. Sclera nonicteric. Mucous membranes moist. Neck supple without adenopathy. Lung fields clear to auscultation. Bilateral extremities with trace edema. DPPT 2+ bilaterally. " "Capillary refill less than 3 seconds. No diabetic foot ulcers noted. Left lateral calf has 2 abrasions. There is no surrounding erythema or warmth. Wounds are tender with cleansing. Lateral wound is covered with 70% yellow adherent slough and 30% granulation tissue and wound measures 4.5 x 5 cm. The second wound is posterior to this and is covered with 100% yellow adherent slough and wound measures 3.9 x 0.9 cm. Moderate amount of light yellow, nonodorous drainage. Wound is cleansed with saline wash, Adaptic followed by gauze sponge and Curlex applied.    ASSESSMENT/PLAN:    ICD-10-CM    1. Abrasion of leg, left, subsequent encounter S80.812D AMB CONSULT TO WOUND CLINIC      Foam Bandage (ALLEVYN GENTLE BORDER LITE) 4 X 4 \" bndg      Foam Bandage (ALLEVYN GENTLE BORDER LITE) 3 X 3 \" bndg      calcium alginate 4 X 4 \" bndg      swab swab      Gauze Bandage 4 X 4 \" spge   2. S/P lung transplant (HC) Z94.2 AMB CONSULT TO WOUND CLINIC   3. Immunosuppressed status (HC) D89.9 AMB CONSULT TO WOUND CLINIC   4. Diabetes mellitus type 2, diet-controlled (HC) E11.9    5. Edema, unspecified type R60.9         Plan:  Patient is at high risk of progression of wound and infection. His infection has resolved with treatment of Augmentin. He will finish out that course. He is aware that with his immune oh suppressed status and his diabetes this increases her risk of further infection and also poor wound healing. He has good nutritional status. Recommendations at this time are to switch him over to daily wound care by cleansing with saline wash, applying Santyl debridement ointment followed by calcium alginate and then Allevyn gentle border lite adhesive dressings. Explained that the Santyl will debride the wound. The process of appropriate wound healing and also signs and symptoms of infection are also reviewed and discussed with patient and his wife. They are both instructed on wound care technique. Before starting new wound care " orders, the Santyl debridement ointment will need to be approved by the transplant team. They have placed a call and are waiting for response. If they did not approve the debridement ointment then they will continue with treatment plan that was given to them by primary doctor. Recommend follow-up in wound clinic in 1 week, sooner with problems. Venous stasis edema is mild. This can be a deterrent to wound healing, will continue to evaluate and treat as indicated.  45 minutes of face-to-face time spent with patient and wife on wound care, counseling and education  Patient Instructions   Daily wound care:  Cleansed with saline wash, apply Santyl debridement ointment to wound bed, cut AlgiSite to fit over the wound bed and cover with Allevyn gentle border lite dressings.  Monitor for signs and symptoms of infection which include fever, chills, redness around wound, increased pain or yellow thick odorous purulent drainage. Need to be seen if signs of infection are noted.  Follow-up in wound clinic in 1 week.  Must have approval through the transplant center before starting Santyl debridement ointment.

## 2017-12-27 NOTE — PROGRESS NOTES
"Patient Information     Patient Name MRN Sex Aubrey Rizo [id number] Male 1955      Progress Notes by Rocio Darling NP at 10/2/2017 10:20 AM     Author:  Rocio Darling NP Service:  (none) Author Type:  PHYS- Nurse Practitioner     Filed:  10/2/2017 11:49 AM Encounter Date:  10/2/2017 Status:  Signed     :  Rocio Darling NP (PHYS- Nurse Practitioner)            SUBJECTIVE:    Aubrey Duncan is a 62 y.o. male who presents for concerns that he has an infection at the wounds.    HPI  patient reports that as of right afternoon he noticed increased discomfort and odorous drainage at the wounds on the left leg. He states odor continues even after he cleanses the wound. He was treated with Augmentin and that antibiotic and did several days ago. He states he has not been febrile but his temperature has been 1  higher at around 98.2 over the past few days. He has been taking the Dilaudid once or twice daily and he states that sometimes that'll raise his temperature also. He has noticed more tenderness at the wound and around the wounds. He does have history of lung transplant and is on immunosuppressive drugs. He denies chills and lethargy. Denies poor appetite and nausea.  He states that he has had problems with other wounds becoming infected in the past and needing debridement. He has had this completed at the [facility]. When that occurred he had a Pseudomonas infection.  Allergies      Allergen   Reactions     Heparin (Bovine)  Thrombocytopenia and Thromboembolic Event     Ciprocinonide  Other - Describe In Comment Field     Tendon problems      Levaquin [Levofloxacin]  Arthralgia     Knee pain; flare of gouty arthritis      Oxycodone  Hallucinations     Pneumovax 23 [Pneumococcal Vaccine]  Fever and Other - Describe In Comment Field     \"My arm swelled up like a balloon.\"    ,   Current Outpatient Prescriptions on File Prior to Visit       Medication  Sig " "Dispense Refill     albuterol HFA (PROAIR HFA) 90 mcg/actuation inhaler Inhale 2 Puffs by mouth 4 times daily if needed. 2 Inhaler 3     alendronate (FOSAMAX) 70 mg tablet Take 1 tablet by mouth once a week in the morning. Take on empty stomach with full glass of water. Do not lie down for 1 hr. 13 tablet 3     azaTHIOprine (IMURAN) 50 mg tablet Take 0.5 tablets by mouth once daily.  1     calcium alginate 4 X 4 \" bndg Apply  topically to affected area(s). 30 unit 0     calcium carbonate-vitamin D3, 600 mg-400 unit, (CALCIUM 600 + D) tablet Take 1 tablet by mouth 2 times daily with meals.  0     ferrous sulfate 325 mg delayed release tablet Take 1 tablet by mouth once daily.  0     Foam Bandage (ALLEVYN GENTLE BORDER LITE) 3 X 3 \" bndg Apply  topically to affected area(s). 30 Each 5     Foam Bandage (ALLEVYN GENTLE BORDER LITE) 4 X 4 \" bndg Apply  topically to affected area(s). 30 Each 5     furosemide (LASIX) 20 mg tablet Take 1 tablet by mouth once daily. 90 tablet 4     Gauze Bandage (KERLIX) 3.4 X 3.6 \"-yard bndg Use BID with dressing changes until healed 96 Each 1     Gauze Bandage 4 X 4 \" spge Apply  topically to affected area(s). Use to clean wounds daily 600 Each 5     HYDROmorphone (DILAUDID) 2 mg tablet 1/2 to 1 tablet every 3 hours as needed 50 tablet 0     lisinopril (PRINIVIL; ZESTRIL) 5 mg tablet Take 1 tablet by mouth once daily in the evening. 90 tablet 4     loperamide (IMODIUM A-D) 2 mg tablet Take 4mg by mouth with 1st loose stool, then 2mg with each subsequent loose stool. Max 16 mg in 24 hrs  0     metoprolol tartrate (LOPRESSOR) 25 mg tablet Take 1 tablet by mouth 2 times daily. 180 tablet 4     multivitamin (MVI) tablet Take 1 tablet by mouth once daily.  0     Non-Adherent Bandage 5 X 9 \" bndg Use BID with dressing changes until healed. 100 Each 1     omeprazole (PRILOSEC) 20 mg Delayed-Release capsule TAKE 1 CAPSULE BY MOUTH 2 TIMES A DAY BEFORE MEALS 180 capsule 2     predniSONE (DELTASONE) " "5 mg tablet Take one tablet by mouth in the morning and one-half tablet every evening.  Take with food.  0     silver sulfADIAZINE (SILVADENE) 1 % cream Apply twice daily with dressing changes until healed 85 g 1     swab swab As directed. Use to apply ointment with Daily dressing change 170 Each 5     tacrolimus (PROGRAF) 0.5 mg capsule Take 1 capsule by mouth every 12 hours. Take along with 2 of the 1 mg capsules to equal 2.5 mg BID  0     tacrolimus (PROGRAF) 1 mg capsule Take 2 capsules by mouth every 12 hours. Take along with 0.5 mg capsules to equal 2.5 mg BID  0     trimethoprim-sulfamethoxazole,  mg, (BACTRIM; SEPTRA) per tablet Take three times per week.  0     valGANciclovir (VALCYTE) 450 mg tablet        No current facility-administered medications on file prior to visit.     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 Affect normal. Alert and oriented ×4. Vital signs reviewed and are stable. Left leg has 2 wounds. These are unchanged in size. There is more necrotic tissue that is now dark brown and dark yellow over each wound. The anterior wound has approximately 75% [employer] tissue and the " posterior wound with 90% necrotic tissue. There is an odor to the drainage. Wound culture is obtained from each wound. There is light pink surrounding the wound perimeter but no swelling. No warmth. There is tenderness during dressing change.    ASSESSMENT/PLAN:    ICD-10-CM    1. Open leg wound, left, subsequent encounter x 2 S81.802D WOUND CULTURE, STAIN      amoxicillin-clavulanate 875-125 mg tablet (AUGMENTIN)      WOUND CULTURE, STAIN   2. Wound infection T14.8XXA CBC WITH DIFFERENTIAL     L08.9 SEDIMENTATION RATE      CRP, inflammatory      WOUND CULTURE, STAIN      WOUND CULTURE, STAIN      amoxicillin-clavulanate 875-125 mg tablet (AUGMENTIN)      amoxicillin-clavulanate 875-125 mg tablet (AUGMENTIN)      CBC WITH DIFFERENTIAL      SEDIMENTATION RATE      CRP, inflammatory      CBC WITH AUTO DIFFERENTIAL      WOUND CULTURE, STAIN      WOUND CULTURE, STAIN   3. Immunosuppressed status (HC) D89.9    4. S/P lung transplant (HC) Z94.2         Plan:  Wound culture ×2 one from each site has been submitted. Labs today will include CBC, sedimentation rate and CRP. He is started on Augmentin 875 mg twice daily for the next 14 days. I have asked that he have these wounds debridement. I was going to set him up with someone from our surgical department within the next 2-3 days however patient requested to personally called down to the [facility] to his transplant team to see if he could see a surgeon there for debridement. He is planning to make this phone call as soon as he gets home today. He will keep me posted as to whether he is able to get an appointment this week or not. He does have a follow-up appoint with me on Friday. If he develops temperature, chills, nausea, increased purulent drainage, redness, warmth, leg pain, etc. he needs to be seen urgently.

## 2017-12-28 NOTE — TELEPHONE ENCOUNTER
Patient Information     Patient Name MRN Sex Aubrey Rizo [id number] Male 1955      Telephone Encounter by Monica Huang at 2017  2:54 PM     Author:  Monica Huang Service:  (none) Author Type:  (none)     Filed:  2017  2:57 PM Encounter Date:  2017 Status:  Signed     :  Monica Huang            Spoke with patient. States he sees Dr Broussard at the [facility]. He was told before he starts any new medications he needs them cleared through him to make sure that they do not interfere with his other medications. He was told by Dr Broussard to not take aspirin and would like it cleared with him before he starts any statins.  Monica Huang LPN .........................2017  2:57 PM

## 2017-12-28 NOTE — TELEPHONE ENCOUNTER
Patient Information     Patient Name MRN Sex Aubrey Rizo [id number] Male 1955      Telephone Encounter by Sussy Rivera at 10/5/2017  9:05 AM     Author:  Sussy Rivera Service:  (none) Author Type:  (none)     Filed:  10/5/2017  9:07 AM Encounter Date:  10/4/2017 Status:  Signed     :  Sussy Rivera            Notified to let patient know that his results were faxed.  Sussy Rivera LPN..........10/5/2017  9:06 AM

## 2017-12-28 NOTE — TELEPHONE ENCOUNTER
Patient Information     Patient Name MRN Sex Aubrey Rizo [id number] Male 1955      Telephone Encounter by Rocio Darling NP at 2017  1:14 PM     Author:  Rocio Darling NP Service:  (none) Author Type:  PHYS- Nurse Practitioner     Filed:  2017  1:14 PM Encounter Date:  2017 Status:  Signed     :  Rocio Darling NP (PHYS- Nurse Practitioner)            ok

## 2017-12-28 NOTE — TELEPHONE ENCOUNTER
Patient Information     Patient Name MRN Aubrey Vallejo [id number] Male 1955      Telephone Encounter by Yessenia Olivas at 10/2/2017  7:28 AM     Author:  Yessenia Olivas Service:  (none) Author Type:  (none)     Filed:  10/2/2017  7:30 AM Encounter Date:  10/2/2017 Status:  Signed     :  Yessenia Olivas            RKV -  Patient called and stated that his wound is not getting better.  It's hot and has white stuff coming out.  He states that when you take the bandage off it smells.  He is wondering if he can be worked in at all today.  We did schedule him for 8 am tomorrow, just in case.  Please call patient with any questions.  Thank you!        Yessenia Olivas ....................  10/2/2017   7:30 AM

## 2017-12-28 NOTE — TELEPHONE ENCOUNTER
Patient Information     Patient Name MRN Sex Aubrey Rizo [id number] Male 1955      Telephone Encounter by Brooke Deluna RN at 2017 10:25 AM     Author:  Brooke Deluna RN Service:  (none) Author Type:  NURS- Registered Nurse     Filed:  2017 10:27 AM Encounter Date:  2017 Status:  Signed     :  Brooke Deluna RN (NURS- Registered Nurse)            Proton Pump Inhibitors    Office visit in the past 12 months or per provider note.    Last visit with KAT DIXON was on: 2016 in [facility] - establish care with Dr. Jiang 17 - medications discussed.   Next visit with KAT DIXON is on: No future appointment listed with this provider  Next visit with Family Practice is on: No future appointment listed in this department    Max refill for 12 months from last office visit or per provider note.    Prescription refilled per RN Medication Refill Policy.................... Brooke Deluna RN ....................  2017   10:25 AM

## 2017-12-28 NOTE — PROGRESS NOTES
Patient Information     Patient Name MRN Sex Aubrey Rizo [id number] Male 1955      Progress Notes by Rocio Darling NP at 2017  9:00 AM     Author:  Rocio Darling NP  Service:  (none) Author Type:  PHYS- Nurse Practitioner     Filed:  2017  9:28 AM  Encounter Date:  2017 Status:  Addendum     :  Rocio Darling NP (PHYS- Nurse Practitioner)        Related Notes: Original Note by Rocio Darling NP (PHYS- Nurse Practitioner) filed at 2017  9:27 AM            SUBJECTIVE:    Aubrey Duncan is a 62 y.o. male who presents for wound follow-up    HPI  he is here today for wound follow-up. He has 2 wounds on the left leg. These have been present for several weeks. He was seen in clinic last week and started on Santyl debridement ointment, calcium alginate and Allevyn dressing. His wife has been doing his dressing changing daily. They're finding that the necrotic tissue is sloughing off. He has more pain over the past week at the wounds. There is no surrounding redness or warmth. There is no purulent or odorous drainage. He's been afebrile. He reports that 4 years ago after his transplant that he was given Dilaudid. He had a few of these left over and started taking those for pain. He had been taking Tylenol but it was not effective and it was upsetting his stomach. He states he is taking Dilaudid 2 mg 1 tablet 2 or 3 times daily. This does help with the pain. The swelling is also down in the left leg. He's been elevating. His sugars are well controlled. He would like a refill of the Dilaudid.  Allergies      Allergen   Reactions     Heparin (Bovine)  Thrombocytopenia and Thromboembolic Event     Ciprocinonide  Other - Describe In Comment Field     Tendon problems      Levaquin [Levofloxacin]  Arthralgia     Knee pain; flare of gouty arthritis      Oxycodone  Hallucinations     Pneumovax 23 [Pneumococcal Vaccine]  Fever and Other - Describe In Comment  "Field     \"My arm swelled up like a balloon.\"    ,   Current Outpatient Prescriptions on File Prior to Visit       Medication  Sig Dispense Refill     albuterol HFA (PROAIR HFA) 90 mcg/actuation inhaler Inhale 2 Puffs by mouth 4 times daily if needed. 2 Inhaler 3     alendronate (FOSAMAX) 70 mg tablet Take 1 tablet by mouth once a week in the morning. Take on empty stomach with full glass of water. Do not lie down for 1 hr. 13 tablet 3     azaTHIOprine (IMURAN) 50 mg tablet Take 0.5 tablets by mouth once daily.  1     calcium alginate 4 X 4 \" bndg Apply  topically to affected area(s). 30 unit 0     calcium carbonate-vitamin D3, 600 mg-400 unit, (CALCIUM 600 + D) tablet Take 1 tablet by mouth 2 times daily with meals.  0     ferrous sulfate 325 mg delayed release tablet Take 1 tablet by mouth once daily.  0     Foam Bandage (ALLEVYN GENTLE BORDER LITE) 3 X 3 \" bndg Apply  topically to affected area(s). 30 Each 5     Foam Bandage (ALLEVYN GENTLE BORDER LITE) 4 X 4 \" bndg Apply  topically to affected area(s). 30 Each 5     furosemide (LASIX) 20 mg tablet Take 1 tablet by mouth once daily. 90 tablet 4     Gauze Bandage (KERLIX) 3.4 X 3.6 \"-yard bndg Use BID with dressing changes until healed 96 Each 1     Gauze Bandage 4 X 4 \" spge Apply  topically to affected area(s). Use to clean wounds daily 600 Each 5     lisinopril (PRINIVIL; ZESTRIL) 5 mg tablet Take 1 tablet by mouth once daily in the evening. 90 tablet 4     loperamide (IMODIUM A-D) 2 mg tablet Take 4mg by mouth with 1st loose stool, then 2mg with each subsequent loose stool. Max 16 mg in 24 hrs  0     metoprolol tartrate (LOPRESSOR) 25 mg tablet Take 1 tablet by mouth 2 times daily. 180 tablet 4     multivitamin (MVI) tablet Take 1 tablet by mouth once daily.  0     Non-Adherent Bandage 5 X 9 \" bndg Use BID with dressing changes until healed. 100 Each 1     omeprazole (PRILOSEC) 20 mg Delayed-Release capsule TAKE 1 CAPSULE BY MOUTH 2 TIMES A DAY BEFORE MEALS " "180 capsule 2     predniSONE (DELTASONE) 5 mg tablet Take one tablet by mouth in the morning and one-half tablet every evening.  Take with food.  0     silver sulfADIAZINE (SILVADENE) 1 % cream Apply twice daily with dressing changes until healed 85 g 1     swab swab As directed. Use to apply ointment with Daily dressing change 170 Each 5     tacrolimus (PROGRAF) 0.5 mg capsule Take 1 capsule by mouth every 12 hours. Take along with 2 of the 1 mg capsules to equal 2.5 mg BID  0     tacrolimus (PROGRAF) 1 mg capsule Take 2 capsules by mouth every 12 hours. Take along with 0.5 mg capsules to equal 2.5 mg BID  0     trimethoprim-sulfamethoxazole,  mg, (BACTRIM; SEPTRA) per tablet Take three times per week.  0     valGANciclovir (VALCYTE) 450 mg tablet        No current facility-administered medications on file prior to visit.     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 Affect normal. Alert and oriented ×4. Wife accompanies him to this visit. Left lateral leg has 2 wounds. The largest wound measures 4.9 x 4 cm and is covered with 40% yellow slough and 60% red granulation tissue. There is a moderate amount of " serous, nonodorous drainage. No surrounding erythema, warmth or maceration. Next wound measures 4 x 0.9 x 0.1 cm with 80% soft brown slough and 20% granulation tissue. No surrounding erythema, warmth or maceration. Moderate amount of serous, nonodorous drainage. There is significant tenderness with cleansing of the wounds. Wounds are cleansed with saline wash, Santyl debridement ointment applied to this wounds, calcium alginate placed over wounds followed by Allevyn gentle border lite dressings. He has trace edema of the left ankle.    ASSESSMENT/PLAN:    ICD-10-CM    1. Open leg wound, left, subsequent encounter x 2 S81.802D HYDROmorphone (DILAUDID) 2 mg tablet   2. Immunosuppressed status (HC) D89.9    3. S/P lung transplant (HC) Z94.2    4. Edema, unspecified type R60.9    5. Diabetes mellitus type 2, diet-controlled (HC) E11.9         Plan:  He will continue to do daily wound care as currently prescribed. Wounds are showing improvement with less slough and more granulation tissue. He is immunosuppressed therefore healing likely will be extended. He is at high risk of infection. His edema has improved by leg elevation. His diabetes has been well controlled. He has been struggling with some wound pain and started taking an old prescription of Dilaudid. He is out of this medication. He is given a refill of Dilaudid 2 mg half to 1 tablet every 3 hours as needed. He will use sparingly. #50 tablets and no refills provided. It is significant that patient did call the transplant center and discussed this with them yesterday and they were fine with him taking the Dilaudid for pain. He does have other narcotic allergies. Side effects of the Dilaudid reviewed and discussed with patient and his wife. He will not take this medication while he is driving or operating other equipment or machinery.

## 2017-12-28 NOTE — TELEPHONE ENCOUNTER
Patient Information     Patient Name MRN Sex Aubrey Rizo [id number] Male 1955      Telephone Encounter by Sasha Padron at 7/10/2017 11:30 AM     Author:  Sasha Padron Service:  (none) Author Type:  (none)     Filed:  7/10/2017 11:31 AM Encounter Date:  7/10/2017 Status:  Signed     :  Sasha Padron            Patient is coming in for labs on 17 please place orders.

## 2017-12-28 NOTE — TELEPHONE ENCOUNTER
Patient Information     Patient Name MRN Sex Aubrey Rizo [id number] Male 1955      Telephone Encounter by Nat Hernandez LPN at 10/3/2017  1:30 PM     Author:  Nat Hernandez LPN Service:  (none) Author Type:  NURS- Licensed Practical Nurse     Filed:  10/3/2017  1:30 PM Encounter Date:  10/3/2017 Status:  Signed     :  Nat Hernadnez LPN (NURS- Licensed Practical Nurse)            See result note from 10/2/2017.  Nat Hernandez LPN.........10/3/2017   1:30 PM

## 2017-12-28 NOTE — TELEPHONE ENCOUNTER
Patient Information     Patient Name MRN Sex Aubrey Rizo [id number] Male 1955      Telephone Encounter by Nat Hernandez LPN at 10/2/2017  8:35 AM     Author:  Nat Hernandez LPN Service:  (none) Author Type:  NURS- Licensed Practical Nurse     Filed:  10/2/2017  8:35 AM Encounter Date:  10/2/2017 Status:  Signed     :  Nat Hernandez LPN (NURS- Licensed Practical Nurse)            Verified patient's last name and date of birth. Notified of the appointment today.  Nat Hernandez LPN.........10/2/2017   8:35 AM

## 2017-12-28 NOTE — TELEPHONE ENCOUNTER
Patient Information     Patient Name MRN Sex Aubrey Rizo [id number] Male 1955      Telephone Encounter by Love Matos at 7/10/2017  1:24 PM     Author:  Love Matos Service:  (none) Author Type:  (none)     Filed:  7/10/2017  1:24 PM Encounter Date:  7/10/2017 Status:  Signed     :  Love Matos            The lab was notified of this below information.  Love Matos LPN..................7/10/2017   1:24 PM

## 2017-12-28 NOTE — PROGRESS NOTES
"Patient Information     Patient Name MRN Sex Aubrey Rizo [id number] Male 1955      Progress Notes by Osvaldo Cohen MD at 2017  2:15 PM     Author:  Osvaldo Cohen MD Service:  (none) Author Type:  Physician     Filed:  2017  9:41 AM Encounter Date:  2017 Status:  Signed     :  Osvaldo Cohen MD (Physician)            SUBJECTIVE:    Aubrey Duncan is a 62 y.o. male who presents for a skin tear    HPI Comments: Patient arrives here for a skin tear. States it occurred on Monday. He was working on a garage with a 2 x 6 spell hitting his left shin. Patient is status post lung transplant. They state that whenever he gets cut he has been placed on antibiotics. He currently is on Bactrim. There's been no pus. They have been using Steri-Strips.      Allergies      Allergen   Reactions     Heparin (Bovine)  Thrombocytopenia and Thromboembolic Event     Ciprocinonide  Other - Describe In Comment Field     Tendon problems      Levaquin [Levofloxacin]  Arthralgia     Knee pain; flare of gouty arthritis      Oxycodone  Hallucinations     Pneumovax 23 [Pneumococcal Vaccine]  Fever and Other - Describe In Comment Field     \"My arm swelled up like a balloon.\"    ,   Family History       Problem   Relation Age of Onset     Other  Mother      Alpha 1 deficiency heterozygote likely;       Heart Disease  Mother      CHF       Other  Father      Emphysema       Heart Disease  Father      GI Disease  Sister      Kidney Failure       Other  Sister      ALS       COPD  Sister      alpha 1 antitrypsin       Other  Sister      x2 Alpha 1 deficient         Other  Sister      Lupus       Other  Sister      Fibromyalgia       Other  Brother      Factor V Leiden mutation       Other  Paternal Grandfather      Emphysema       Other  Daughter      M2Z heterozygote for alpha 1 deficiency      and   Current Outpatient Prescriptions on File Prior to Visit       Medication  Sig Dispense Refill     albuterol " HFA (PROAIR HFA) 90 mcg/actuation inhaler Inhale 2 Puffs by mouth 4 times daily if needed. 2 Inhaler 3     alendronate (FOSAMAX) 70 mg tablet Take 1 tablet by mouth once a week in the morning. Take on empty stomach with full glass of water. Do not lie down for 1 hr. 13 tablet 3     azaTHIOprine (IMURAN) 50 mg tablet Take 0.5 tablets by mouth once daily.  1     calcium carbonate-vitamin D3, 600 mg-400 unit, (CALCIUM 600 + D) tablet Take 1 tablet by mouth 2 times daily with meals.  0     ferrous sulfate 325 mg delayed release tablet Take 1 tablet by mouth once daily.  0     furosemide (LASIX) 20 mg tablet Take 1 tablet by mouth once daily. 90 tablet 4     lisinopril (PRINIVIL; ZESTRIL) 5 mg tablet Take 1 tablet by mouth once daily in the evening. 90 tablet 4     loperamide (IMODIUM A-D) 2 mg tablet Take 4mg by mouth with 1st loose stool, then 2mg with each subsequent loose stool. Max 16 mg in 24 hrs  0     metoprolol tartrate (LOPRESSOR) 25 mg tablet Take 1 tablet by mouth 2 times daily. 180 tablet 4     multivitamin (MVI) tablet Take 1 tablet by mouth once daily.  0     omeprazole (PRILOSEC) 20 mg Delayed-Release capsule TAKE 1 CAPSULE BY MOUTH 2 TIMES A DAY BEFORE MEALS 180 capsule 2     predniSONE (DELTASONE) 5 mg tablet Take one tablet by mouth in the morning and one-half tablet every evening.  Take with food.  0     tacrolimus (PROGRAF) 0.5 mg capsule Take 1 capsule by mouth every 12 hours. Take along with 2 of the 1 mg capsules to equal 2.5 mg BID  0     tacrolimus (PROGRAF) 1 mg capsule Take 2 capsules by mouth every 12 hours. Take along with 0.5 mg capsules to equal 2.5 mg BID  0     trimethoprim-sulfamethoxazole,  mg, (BACTRIM; SEPTRA) per tablet Take three times per week.  0     valGANciclovir (VALCYTE) 450 mg tablet        No current facility-administered medications on file prior to visit.        REVIEW OF SYSTEMS:  ROS    OBJECTIVE:  /64  Pulse 60  Wt 77.7 kg (171 lb 3.2 oz)  BMI 26.81  kg/m2    EXAM:   Physical Exam   Constitutional: He is well-developed, well-nourished, and in no distress.   Skin:   There is a flap-like laceration L shape 5 cm x 4 cm. The edges are dusky. He does have good pink tissue underneath. No signs of redness or discharge present.       ASSESSMENT/PLAN:    ICD-10-CM    1. Laceration of skin T14.8         Plan:discussion with the patient and his wife. He currently is on an antibiotic prophylactically. He certainly is at risk for infection. Recommended and discussed signs of infection. If sign should occur he needs to be seen for reevaluation. Also discussed avoiding Neosporin and using Vaseline. Follow-up with her primary physician if any changes occur. Also discussed the flap is likely not to adhere to his wound.

## 2017-12-28 NOTE — TELEPHONE ENCOUNTER
Patient Information     Patient Name MRN Aubrey Vallejo [id number] Male 1955      Telephone Encounter by David Jiang MD at 2017  2:14 PM     Author:  David Jiang MD Service:  (none) Author Type:  Physician     Filed:  2017  2:15 PM Encounter Date:  2017 Status:  Signed     :  aDvid Jiang MD (Physician)            Please call let Mr. Duncan know that in reviewing the diabetes registry, it appears he is not taking aspirin or statin. Both of which are recommended with diabetes. Please see if he like me to send prescriptions for him.    Signed, David Jiang MD  Internal Medicine & Pediatrics

## 2017-12-28 NOTE — TELEPHONE ENCOUNTER
Patient Information     Patient Name MRN Sex Aubrey Rizo [id number] Male 1955      Telephone Encounter by Rocio Darling NP at 10/2/2017  8:33 AM     Author:  Rocio Darling NP Service:  (none) Author Type:  PHYS- Nurse Practitioner     Filed:  10/2/2017  8:33 AM Encounter Date:  10/2/2017 Status:  Signed     :  Rocio Darling NP (PHYS- Nurse Practitioner)            I can work him in at 10:20 today

## 2017-12-28 NOTE — TELEPHONE ENCOUNTER
Patient Information     Patient Name MRN Sex Aubrey Rizo [id number] Male 1955      Telephone Encounter by David Jiang MD at 7/10/2017 12:03 PM     Author:  David Jiang MD Service:  (none) Author Type:  Physician     Filed:  7/10/2017 12:03 PM Encounter Date:  7/10/2017 Status:  Signed     :  David Jiang MD (Physician)            He did my labs last month. Perhaps it's for the Ulysses.    Signed, David Jiang MD  Internal Medicine & Pediatrics

## 2017-12-28 NOTE — TELEPHONE ENCOUNTER
Patient Information     Patient Name MRN Sex Aubrey Rizo [id number] Male 1955      Telephone Encounter by Nat Hernandez LPN at 10/4/2017  2:24 PM     Author:  Nat Hernandez LPN Service:  (none) Author Type:  NURS- Licensed Practical Nurse     Filed:  10/4/2017  2:29 PM Encounter Date:  10/4/2017 Status:  Signed     :  Nat Hernandez LPN (NURS- Licensed Practical Nurse)            Wound culture results faxed to fax number.  Nat Hernandez LPN.........10/4/2017   2:29 PM

## 2017-12-28 NOTE — PATIENT INSTRUCTIONS
Patient Information     Patient Name MRN Sex Aubrey Rizo [id number] Male 1955      Patient Instructions by Rocio Darling NP at 2017 10:40 AM     Author:  Rocio Darling NP Service:  (none) Author Type:  PHYS- Nurse Practitioner     Filed:  2017 11:21 AM Encounter Date:  2017 Status:  Signed     :  Rocio Darling NP (PHYS- Nurse Practitioner)            Daily wound care:  Cleansed with saline wash, apply Santyl debridement ointment to wound bed, cut AlgiSite to fit over the wound bed and cover with Allevyn gentle border lite dressings.  Monitor for signs and symptoms of infection which include fever, chills, redness around wound, increased pain or yellow thick odorous purulent drainage. Need to be seen if signs of infection are noted.  Follow-up in wound clinic in 1 week.  Must have approval through the transplant center before starting Santyl debridement ointment.

## 2017-12-29 NOTE — PATIENT INSTRUCTIONS
Patient Information     Patient Name MRN Sex Aubrey Rizo [id number] Male 1955      Patient Instructions by David Jiang MD at 2017  9:30 AM     Author:  David Jiang MD Service:  (none) Author Type:  Physician     Filed:  2017  9:49 AM Encounter Date:  2017 Status:  Signed     :  David Jiang MD (Physician)             -- Twice daily dressing changes with silvadene, adaptic then kerlix   -- Augmentin   -- Eat yogurt 1-2 times per day while on antibiotics (and for a few weeks after) to reduce the chances of diarrhea   -- Wound consult with Cora   -- Return if worse

## 2017-12-29 NOTE — H&P
Patient Information     Patient Name MRN Sex Aubrey Rizo [id number] Male 1955      H&P signed by Rocio Darling NP at 10/17/2017 10:34 AM      Author:  Rocio Darling NP Service:  (none) Author Type:  PHYS- Nurse Practitioner     Filed:  10/17/2017 10:34 AM Encounter Date:  10/16/2017 Status:  Signed     :  Rocio Darling NP (PHYS- Nurse Practitioner)            DATE OF SERVICE:  10/16/17    [facility]    CHIEF COMPLAINT:  Infected wound, left leg.     HISTORY OF PRESENT ILLNESS:  This is a gentleman who I am familiar with.  He had an infected wound that was covered with eschar.  He had a positive wound culture at [facility].  Patient was sent down to [facility] to follow up.  While he was there, he was managed by surgery, wound care team, and infectious disease specialist.  His wound culture was repeated and it grew E. faecalis, E. avium, and pseudomonas putida.  He was treated with Zosyn.  He was discharged to [facility] for an additional 7 days of antibiotics.  He received his last dose of antibiotics today.  He has had daily wound care by applying Polymem wrapped with Kerlix.  He has a granulating wound bed.  There has been no redness or warmth surrounding.  He has not had any fever.  He does have a followup appointment on Thursday with the lung transplant team and also wound care team.  While he was there, his CRP was initially elevated but at the time of possible discharge had returned back to normal.  His blood cultures were all negative.  He did not need surgical debridement.  While being at [facility] he has been stable.  His vital signs have all been good except the last couple of days.  He had a blood pressure on 10/14 that was 152/87 and yesterday was 183/89.  He has not had a blood pressure yet today.  Otherwise, since he has been here his blood pressures have averaged from 104//74.  He is not having any symptoms of  headaches, blurred vision, chest pain or chest pressure.  No cough.  Overall feeling well.  No edema.  It is to note, that this gentleman has alpha-1 antitrypsin deficiency and had a lung transplant.  He is on chronic immunosuppressant therapy.     Past medical history, allergies and meds were reviewed.      REVIEW OF SYSTEMS  Complete review of systems discussed with nursing staff and resident, see HPI for positive findings.     PHYSICAL EXAMINATION:   GENERAL:  Pleasant gentleman without acute distress.  He is alert, talkative and in good spirits.  Appears to be in his usual state of health.    VITAL SIGNS:  Heart rate 67, respiratory rate 18, temp 97.3, SPO2 99% on room air.    SKIN:  Color is pink.   HEENT:  Mucous membranes moist.   NECK:  Supple, without adenopathy.   LUNGS:  Fields clear.   CARDIOVASCULAR:  Regular rate and rhythm.   ABDOMEN:  Soft and without masses, tenderness or organomegaly.   EXTREMITIES:  Without edema.  Left lateral leg has 2 wounds.  These are both smaller in size and have fresh granulation tissue.  No eschar.  No slough.  No odorous or purulent drainage.  The drainage is clear in color and a small amount.  Wound is without any surrounding erythema and maceration.     ASSESSMENT:   1.  Infected wound, left leg.   2.  Lung transplant.    3.  Chronic immunosuppression.   4.  Elevated blood pressure.   5.  Hypertension.      PLAN:   Patient will receive PICC flush supplies for the next 7 days.  It is not clear how often he needs to flush the PICC line.  Nursing staff will call [facility] infectious disease specialist and find out if he is to have daily flushes or otherwise.  He is now done with his IV antibiotics.  He does not have any orders for oral antibiotics.  No signs of infection at this time.  He has follow up in wound clinic at [facility] on Thursday.  He is wondering if he needs another wound culture, however, by the records that I am reviewing I do  not see any orders recommending him to have a follow up wound culture.  He can discuss this with the wound care team on Thursday to determine further.  Also discussed with patient that they will determine his follow up wound care at that appointment.  His blood pressure has been elevated over the past couple of days, otherwise has been normal.  I have asked that he check his blood pressure while he is at home and if he continues to have blood pressures elevated higher than 150 that he discuss this at his appointment on Thursday with the transplant team.      JUN WHITE/luis carlos  D:10/16/2017 15:44:52  T:10/17/2017 09:04:12  VJID: [id number]  TJID: [id number]    cc:EDDI CLARK MD

## 2017-12-30 NOTE — NURSING NOTE
Patient Information     Patient Name MRN Sex Aubrey Rizo [id number] Male 1955      Nursing Note by Nat Hernandez LPN at 2017 10:40 AM     Author:  Nat Hernandez LPN Service:  (none) Author Type:  NURS- Licensed Practical Nurse     Filed:  2017 10:58 AM Encounter Date:  2017 Status:  Signed     :  Nat Hernandez LPN (NURS- Licensed Practical Nurse)            Aubrey Duncan is a 62 y.o. male here today for wound care of the left leg abrasion that occurred on 17.  Nat Hernandez LPN.........2017   10:40 AM

## 2017-12-30 NOTE — NURSING NOTE
Patient Information     Patient Name MRN Sex Aubrey Rizo [id number] Male 1955      Nursing Note by Latesha Chavez at 2017  2:15 PM     Author:  Latesha Chavez Service:  (none) Author Type:  (none)     Filed:  2017  2:18 PM Encounter Date:  2017 Status:  Signed     :  Latesha Chavez            Patient here for skin tear to left shin 4 days prior. Latesha Chavez LPN .......................2017  2:14 PM

## 2017-12-30 NOTE — NURSING NOTE
Patient Information     Patient Name MRN Sex Aubrey Rizo [id number] Male 1955      Nursing Note by Nat Hernandez LPN at 2017  9:00 AM     Author:  Nat Hernandez LPN Service:  (none) Author Type:  NURS- Licensed Practical Nurse     Filed:  2017  9:15 AM Encounter Date:  2017 Status:  Signed     :  Nat Hernandez LPN (NURS- Licensed Practical Nurse)            Aubrey Duncan is a 62 y.o. male here today for wound care of the left leg.  Nat Hernandez LPN.........2017   8:58 AM

## 2017-12-30 NOTE — NURSING NOTE
Patient Information     Patient Name MRN Sex Aubrey Rizo [id number] Male 1955      Nursing Note by Nat Hernandez LPN at 10/2/2017 10:20 AM     Author:  Nat Hernandez LPN Service:  (none) Author Type:  NURS- Licensed Practical Nurse     Filed:  10/2/2017 10:45 AM Encounter Date:  10/2/2017 Status:  Signed     :  Nat Hernandez LPN (NURS- Licensed Practical Nurse)            Aubrey Duncan is a 62 y.o. male here today for wound care of the left leg. Stated that when he changes the bandage the wound stinks.  Nat Hernandez LPN.........10/2/2017   10:29 AM

## 2017-12-30 NOTE — NURSING NOTE
Patient Information     Patient Name MRN Aubrey Vallejo [id number] Male 1955      Nursing Note by Ale Eller at 2017  9:30 AM     Author:  Ale Eller Service:  (none) Author Type:  (none)     Filed:  2017  9:40 AM Encounter Date:  2017 Status:  Signed     :  Ale Eller            Patient presents to clinic for sore on left lower leg that happened on 17 after dropping a 2 X 4 on his leg.  Ale Eller LPN ....................  2017   9:31 AM

## 2018-01-04 NOTE — TELEPHONE ENCOUNTER
Patient Information     Patient Name MRN Sex Aubrey Rizo [id number] Male 1955      Telephone Encounter by Mandy Rubio RN at 2017  3:20 PM     Author:  Mandy Rubio RN Service:  (none) Author Type:  NURS- Registered Nurse     Filed:  2017  3:25 PM Encounter Date:  2017 Status:  Signed     :  Mandy Rubio RN (NURS- Registered Nurse)            Proton Pump Inhibitors    Office visit in the past 12 months or per provider note.    Last visit with KAT DIXON was on: 2016 in [facility]  Next visit with KAT DIXON is on: No future appointment listed with this provider  Next visit with Family Practice is on: No future appointment listed in this department    Max refill for 12 months from last office visit or per provider note.    Patient is due for medication management appointment. Limited refill provided at this time. [employer] message and letter sent for reminder to patient. Prescription refilled per RN Medication Refill Policy.................... Mandy Rubio RN ....................  2017   3:22 PM

## 2018-01-05 NOTE — PATIENT INSTRUCTIONS
Patient Information     Patient Name MRN Sex Aubrey Rizo [id number] Male 1955      Patient Instructions by David Jiang MD at 2017  7:45 AM     Author:  David Jiang MD  Service:  (none) Author Type:  Physician     Filed:  2017  8:26 AM  Encounter Date:  2017 Status:  Addendum     :  David Jiang MD (Physician)        Related Notes: Original Note by David Jiang MD (Physician) filed at 2017  8:26 AM             -- Increase lisinopril to 5 mg daily   -- Fasting labs in 1 month, including CBC   -- Follow-up scheduled with Dr. Broussard to revisit fosamax, dexa scan   -- If A1c remains up and/or blood pressure is above 140 more often than not, return for recheck in 2-3 months   -- Else annual follow-up with David Jiang MD is fine

## 2018-01-05 NOTE — PROGRESS NOTES
Patient Information     Patient Name MRN Sex Aubrey Rizo [id number] Male 1955      Progress Notes by David Jiang MD at 2017  7:45 AM     Author:  David Jiang MD Service:  (none) Author Type:  Physician     Filed:  2017  5:33 PM Encounter Date:  2017 Status:  Signed     :  David Jiang MD (Physician)            Subjective  Aburey Duncan is a 61 y.o. male who presents for [facility] primary care. Previous physician was Dr. Christi Thomas. He tells me he gets the majority of his health care down at the [facility] transplant center at the direction of Dr. Kirk Broussard. He has a history of alpha-1 antitrypsin deficiency and is status post bilateral lung transplant at the [facility] in . He continues on Prograf, Imuran, Bactrim prophylaxis. He has a history of hypertension which has been controlled with furosemide, lisinopril, metoprolol. He's noticed that his blood pressures are typically a little bit higher in the morning and better throughout the day. He takes his lisinopril in the evening. He has a history of osteopenia and continues on Fosamax. He tells me that Dr. Broussard is planning on repeating bone density in the near future. He has a history of diabetes mellitus type 2 that has been diet-controlled. He thinks it was triggered by steroids. He had a history of a DVT of the upper extremity which was triggered by surgery. History of gastroesophageal reflux disease that is stable with omeprazole.    Problem List/PMH: reviewed in EMR, and made relevant updates today.  Medications: reviewed in EMR, and made relevant updates today.  Allergies: reviewed in EMR, and made relevant updates today.    Social Hx:  Social History       Substance Use Topics         Smoking status:   Former Smoker     Packs/day:  3.50     Years:  17.00     Types:  Cigarettes     Quit date:  1987     Smokeless tobacco:   Never Used      Comment: had lung transplant in  "Sept 2013      Alcohol use   No     Social History Narrative    He is disabled from his COPD. - Lung transplant 2013    [place]    where his wife works as a dental hygienist.    Kyung - Wife                        I reviewed social history and made relevant updates today.    Family Hx:   Family History       Problem   Relation Age of Onset     Other  Mother      Alpha 1 deficiency heterozygote likely;       Heart Disease  Mother      CHF       Other  Father      Emphysema       Heart Disease  Father      GI Disease  Sister      Kidney Failure       Other  Sister      ALS       COPD  Sister      alpha 1 antitrypsin       Other  Sister      x2 Alpha 1 deficient         Other  Sister      Lupus       Other  Sister      Fibromyalgia       Other  Brother      Factor V Leiden mutation       Other  Paternal Grandfather      Emphysema       Other  Daughter      M2Z heterozygote for alpha 1 deficiency         Objective  Vitals: reviewed in EMR.  /90  Pulse 64  Ht 1.702 m (5' 7\")  Wt 79.8 kg (176 lb)  BMI 27.57 kg/m2    Gen: Pleasant male, NAD.  HEENT: MMM  Neck: Supple, no JVD, no bruits.  CV: RRR no m/r/g.   Pulm: CTAB no w/r/r  Neuro: Grossly intact  Msk: No lower extremity edema.  Skin: No concerning lesions.  Psychiatric: Normal affect and insight. Does not appear anxious or depressed.    Diabetes Labs  Lab Results      Component  Value Date/Time    HGBA1C 7.2 (H) 05/02/2016 08:00 AM    HGBA1C 5.8 06/03/2013 11:10 AM    CHOL 191 05/02/2016 08:00 AM    HDL 42 05/02/2016 08:00 AM    LDLCHOL 95 05/02/2016 08:00 AM    TRIGLYCERIDE 269 (H) 05/02/2016 08:00 AM    CREATININE 1.04 01/19/2017 07:48 AM         Assessment    ICD-10-CM    1. Encounter to establish care Z76.89    2. Essential hypertension I10 furosemide (LASIX) 20 mg tablet      lisinopril (PRINIVIL; ZESTRIL) 5 mg tablet      metoprolol tartrate (LOPRESSOR) 25 mg tablet      CBC " WITH DIFFERENTIAL   3. Alpha-1-antitrypsin deficiency (HC) E88.01    4. S/P lung transplant (HC) Z94.2    5. Diabetes mellitus type 2, diet-controlled (HC) E11.9 Hgb A1c   6. Mixed hyperlipidemia E78.2 LIPID PANEL   7. Osteopenia, unspecified location M85.80        We reviewed his complex past medical history together today. I told him happy to help him with medication management for some of his conditions such as diabetes mellitus type 2, hypertension, gastroesophageal reflux disease, etc. Of course I'll be leaving medication management for his history of lung transplant to Dr. Broussard. I reviewed his home blood pressure log, see scanned documents. I would like to see his blood pressure is slightly better controlled. We discussed options and decided to increase the dose of lisinopril. It is possible this could cause myelosuppression in combination with azathioprine however his leukopenia has been chronic and stable. We will repeat a CBC in a month.    Plan   -- Expected clinical course discussed   -- Medications and their side effects discussed  Patient Instructions    -- Increase lisinopril to 5 mg daily   -- Fasting labs in 1 month, including CBC   -- Follow-up scheduled with Dr. Broussard to revisit fosamax, dexa scan   -- If A1c remains up and/or blood pressure is above 140 more often than not, return for recheck in 2-3 months   -- Else annual follow-up with David Jiang MD is fine    Return in about 1 year (around 5/11/2018) for medication management, Annual Exam.    Signed, David Jiang MD  Internal Medicine & Pediatrics    cc Dr. Kirk Broussard, [facility] pulmonary transplant clinic.

## 2018-01-05 NOTE — NURSING NOTE
Patient Information     Patient Name MRN Sex Aubrey Rizo [id number] Male 1955      Nursing Note by Ale Eller at 2017  7:45 AM     Author:  Ale Eller Service:  (none) Author Type:  (none)     Filed:  2017  7:56 AM Encounter Date:  2017 Status:  Signed     :  Ale Eller            Patient presents to clinic to establish care today.  Was Raeann Thomas MD patient.  Also needs medication review today.  Ale Eller LPN ....................  2017   7:48 AM

## 2018-01-26 VITALS
TEMPERATURE: 96.4 F | HEIGHT: 67 IN | HEIGHT: 67 IN | WEIGHT: 173 LBS | SYSTOLIC BLOOD PRESSURE: 136 MMHG | SYSTOLIC BLOOD PRESSURE: 130 MMHG | DIASTOLIC BLOOD PRESSURE: 82 MMHG | DIASTOLIC BLOOD PRESSURE: 70 MMHG | TEMPERATURE: 97 F | BODY MASS INDEX: 27.15 KG/M2 | WEIGHT: 171 LBS | BODY MASS INDEX: 26.84 KG/M2

## 2018-01-26 VITALS
SYSTOLIC BLOOD PRESSURE: 140 MMHG | BODY MASS INDEX: 26.37 KG/M2 | HEIGHT: 67 IN | HEIGHT: 67 IN | BODY MASS INDEX: 27.62 KG/M2 | HEART RATE: 64 BPM | SYSTOLIC BLOOD PRESSURE: 120 MMHG | DIASTOLIC BLOOD PRESSURE: 76 MMHG | TEMPERATURE: 96.7 F | DIASTOLIC BLOOD PRESSURE: 90 MMHG | WEIGHT: 168 LBS | WEIGHT: 176 LBS

## 2018-01-26 VITALS
BODY MASS INDEX: 27.28 KG/M2 | HEIGHT: 67 IN | WEIGHT: 173.8 LBS | DIASTOLIC BLOOD PRESSURE: 86 MMHG | TEMPERATURE: 97.9 F | SYSTOLIC BLOOD PRESSURE: 130 MMHG | HEART RATE: 68 BPM

## 2018-01-26 VITALS
WEIGHT: 171.2 LBS | DIASTOLIC BLOOD PRESSURE: 64 MMHG | SYSTOLIC BLOOD PRESSURE: 130 MMHG | HEART RATE: 60 BPM | BODY MASS INDEX: 26.81 KG/M2

## 2018-01-29 ASSESSMENT — PATIENT HEALTH QUESTIONNAIRE - PHQ9: SUM OF ALL RESPONSES TO PHQ QUESTIONS 1-9: 0

## 2018-01-31 ENCOUNTER — DOCUMENTATION ONLY (OUTPATIENT)
Dept: FAMILY MEDICINE | Facility: OTHER | Age: 63
End: 2018-01-31

## 2018-01-31 PROBLEM — L25.9 CONTACT DERMATITIS AND ECZEMA: Status: ACTIVE | Noted: 2018-01-31

## 2018-01-31 PROBLEM — M85.80 OSTEOPENIA: Status: ACTIVE | Noted: 2017-05-11

## 2018-01-31 PROBLEM — K21.9 ESOPHAGEAL REFLUX: Status: ACTIVE | Noted: 2018-01-31

## 2018-01-31 PROBLEM — I10 HYPERTENSION: Status: ACTIVE | Noted: 2018-01-31

## 2018-01-31 PROBLEM — J44.9 CHRONIC OBSTRUCTIVE PULMONARY DISEASE (H): Status: ACTIVE | Noted: 2018-01-31

## 2018-01-31 PROBLEM — E11.9 DIABETES MELLITUS TYPE 2, DIET-CONTROLLED (H): Status: ACTIVE | Noted: 2017-05-11

## 2018-01-31 PROBLEM — M10.9 GOUT: Status: ACTIVE | Noted: 2018-01-31

## 2018-01-31 PROBLEM — L82.1 SEBORRHEIC KERATOSIS: Status: ACTIVE | Noted: 2018-01-31

## 2018-02-15 DIAGNOSIS — Z79.52 LONG TERM CURRENT USE OF SYSTEMIC STEROIDS: ICD-10-CM

## 2018-02-15 DIAGNOSIS — M85.80 OSTEOPENIA: ICD-10-CM

## 2018-02-15 DIAGNOSIS — Z94.2 LUNG REPLACED BY TRANSPLANT (H): Primary | ICD-10-CM

## 2018-02-15 DIAGNOSIS — Z79.899 ENCOUNTER FOR LONG-TERM (CURRENT) USE OF HIGH-RISK MEDICATION: ICD-10-CM

## 2018-02-22 ENCOUNTER — OFFICE VISIT (OUTPATIENT)
Dept: DERMATOLOGY | Facility: CLINIC | Age: 63
End: 2018-02-22
Payer: MEDICARE

## 2018-02-22 ENCOUNTER — RADIANT APPOINTMENT (OUTPATIENT)
Dept: GENERAL RADIOLOGY | Facility: CLINIC | Age: 63
End: 2018-02-22
Attending: INTERNAL MEDICINE
Payer: MEDICARE

## 2018-02-22 ENCOUNTER — OFFICE VISIT (OUTPATIENT)
Dept: TRANSPLANT | Facility: CLINIC | Age: 63
End: 2018-02-22
Attending: INTERNAL MEDICINE
Payer: MEDICARE

## 2018-02-22 ENCOUNTER — RESULTS ONLY (OUTPATIENT)
Dept: OTHER | Facility: CLINIC | Age: 63
End: 2018-02-22

## 2018-02-22 ENCOUNTER — RADIANT APPOINTMENT (OUTPATIENT)
Dept: BONE DENSITY | Facility: CLINIC | Age: 63
End: 2018-02-22
Attending: INTERNAL MEDICINE
Payer: MEDICARE

## 2018-02-22 VITALS
SYSTOLIC BLOOD PRESSURE: 150 MMHG | OXYGEN SATURATION: 99 % | WEIGHT: 166 LBS | HEART RATE: 68 BPM | HEIGHT: 67 IN | BODY MASS INDEX: 26.06 KG/M2 | DIASTOLIC BLOOD PRESSURE: 76 MMHG

## 2018-02-22 DIAGNOSIS — Z94.2 S/P LUNG TRANSPLANT (H): ICD-10-CM

## 2018-02-22 DIAGNOSIS — Z79.899 ENCOUNTER FOR LONG-TERM CURRENT USE OF MEDICATION: Primary | ICD-10-CM

## 2018-02-22 DIAGNOSIS — Z94.2 LUNG REPLACED BY TRANSPLANT (H): ICD-10-CM

## 2018-02-22 DIAGNOSIS — L57.0 KERATOSIS, ACTINIC: Primary | ICD-10-CM

## 2018-02-22 DIAGNOSIS — Z94.2 S/P LUNG TRANSPLANT (H): Primary | ICD-10-CM

## 2018-02-22 DIAGNOSIS — Z79.899 ENCOUNTER FOR LONG-TERM CURRENT USE OF MEDICATION: ICD-10-CM

## 2018-02-22 DIAGNOSIS — Z79.899 ENCOUNTER FOR LONG-TERM (CURRENT) USE OF MEDICATIONS: ICD-10-CM

## 2018-02-22 DIAGNOSIS — Z79.52 LONG TERM CURRENT USE OF SYSTEMIC STEROIDS: ICD-10-CM

## 2018-02-22 DIAGNOSIS — D72.819 LEUKOPENIA, UNSPECIFIED TYPE: ICD-10-CM

## 2018-02-22 LAB
ANION GAP SERPL CALCULATED.3IONS-SCNC: 5 MMOL/L (ref 3–14)
BASOPHILS # BLD AUTO: 0.1 10E9/L (ref 0–0.2)
BASOPHILS NFR BLD AUTO: 1.8 %
BUN SERPL-MCNC: 32 MG/DL (ref 7–30)
CALCIUM SERPL-MCNC: 8.5 MG/DL (ref 8.5–10.1)
CHLORIDE SERPL-SCNC: 104 MMOL/L (ref 94–109)
CO2 SERPL-SCNC: 29 MMOL/L (ref 20–32)
CREAT SERPL-MCNC: 1.12 MG/DL (ref 0.66–1.25)
DIFFERENTIAL METHOD BLD: ABNORMAL
EOSINOPHIL # BLD AUTO: 0 10E9/L (ref 0–0.7)
EOSINOPHIL NFR BLD AUTO: 0.9 %
ERYTHROCYTE [DISTWIDTH] IN BLOOD BY AUTOMATED COUNT: 12.6 % (ref 10–15)
EXPTIME-PRE: 7.78 SEC
FEF2575-%PRED-PRE: 119 %
FEF2575-PRE: 3.11 L/SEC
FEF2575-PRED: 2.6 L/SEC
FEFMAX-%PRED-PRE: 106 %
FEFMAX-PRE: 8.93 L/SEC
FEFMAX-PRED: 8.41 L/SEC
FEV1-%PRED-PRE: 108 %
FEV1-PRE: 3.47 L
FEV1FEV6-PRE: 80 %
FEV1FEV6-PRED: 78 %
FEV1FVC-PRE: 81 %
FEV1FVC-PRED: 77 %
FIFMAX-PRE: 7.07 L/SEC
FVC-%PRED-PRE: 103 %
FVC-PRE: 4.31 L
FVC-PRED: 4.16 L
GFR SERPL CREATININE-BSD FRML MDRD: 66 ML/MIN/1.7M2
GLUCOSE SERPL-MCNC: 134 MG/DL (ref 70–99)
HCT VFR BLD AUTO: 40.4 % (ref 40–53)
HGB BLD-MCNC: 13.1 G/DL (ref 13.3–17.7)
INR PPP: 1.07 (ref 0.86–1.14)
LYMPHOCYTES # BLD AUTO: 2.4 10E9/L (ref 0.8–5.3)
LYMPHOCYTES NFR BLD AUTO: 67.5 %
MAGNESIUM SERPL-MCNC: 1.8 MG/DL (ref 1.6–2.3)
MCH RBC QN AUTO: 32.9 PG (ref 26.5–33)
MCHC RBC AUTO-ENTMCNC: 32.4 G/DL (ref 31.5–36.5)
MCV RBC AUTO: 102 FL (ref 78–100)
METAMYELOCYTES # BLD: 0 10E9/L
METAMYELOCYTES NFR BLD MANUAL: 0.9 %
MONOCYTES # BLD AUTO: 0.3 10E9/L (ref 0–1.3)
MONOCYTES NFR BLD AUTO: 9.6 %
NEUTROPHILS # BLD AUTO: 0.7 10E9/L (ref 1.6–8.3)
NEUTROPHILS NFR BLD AUTO: 19.3 %
PLATELET # BLD AUTO: 169 10E9/L (ref 150–450)
PLATELET # BLD EST: ABNORMAL 10*3/UL
POTASSIUM SERPL-SCNC: 5 MMOL/L (ref 3.4–5.3)
RBC # BLD AUTO: 3.98 10E12/L (ref 4.4–5.9)
RBC MORPH BLD: NORMAL
RETICS # AUTO: 48.6 10E9/L (ref 25–95)
RETICS/RBC NFR AUTO: 1.2 % (ref 0.5–2)
SODIUM SERPL-SCNC: 138 MMOL/L (ref 133–144)
TACROLIMUS BLD-MCNC: 10.1 UG/L (ref 5–15)
TME LAST DOSE: NORMAL H
WBC # BLD AUTO: 3.5 10E9/L (ref 4–11)

## 2018-02-22 PROCEDURE — 86833 HLA CLASS II HIGH DEFIN QUAL: CPT | Performed by: INTERNAL MEDICINE

## 2018-02-22 PROCEDURE — 85025 COMPLETE CBC W/AUTO DIFF WBC: CPT | Performed by: INTERNAL MEDICINE

## 2018-02-22 PROCEDURE — 85045 AUTOMATED RETICULOCYTE COUNT: CPT | Performed by: INTERNAL MEDICINE

## 2018-02-22 PROCEDURE — 80197 ASSAY OF TACROLIMUS: CPT | Performed by: INTERNAL MEDICINE

## 2018-02-22 PROCEDURE — 36415 COLL VENOUS BLD VENIPUNCTURE: CPT | Performed by: INTERNAL MEDICINE

## 2018-02-22 PROCEDURE — 40000611 ZZHCL STATISTIC MORPHOLOGY W/INTERP HEMEPATH TC 85060: Performed by: INTERNAL MEDICINE

## 2018-02-22 PROCEDURE — 86832 HLA CLASS I HIGH DEFIN QUAL: CPT | Performed by: INTERNAL MEDICINE

## 2018-02-22 PROCEDURE — 80048 BASIC METABOLIC PNL TOTAL CA: CPT | Performed by: INTERNAL MEDICINE

## 2018-02-22 PROCEDURE — 83735 ASSAY OF MAGNESIUM: CPT | Performed by: INTERNAL MEDICINE

## 2018-02-22 PROCEDURE — 85610 PROTHROMBIN TIME: CPT | Performed by: INTERNAL MEDICINE

## 2018-02-22 PROCEDURE — G0463 HOSPITAL OUTPT CLINIC VISIT: HCPCS | Mod: ZF

## 2018-02-22 ASSESSMENT — PAIN SCALES - GENERAL
PAINLEVEL: NO PAIN (0)
PAINLEVEL: NO PAIN (0)

## 2018-02-22 NOTE — NURSING NOTE
Transplant Coordinator Note    Reason for visit: Post lung transplant follow up visit   Coordinator: Present   Caregiver:      Health concerns addressed today:  1. Breathing--4 year annual today.    2. Urinary stones--followed by urology.  3. Right leg wound--healing well.    Activity:     Oxygen needs:     Pain management:     Diabetic management:     Pt Education:     Health Maintenance:     Labs, CXR, PFTs reviewed with patient  Medication record reviewed and reconciled  Questions and concerns addressed    Patient Instructions  1. pft's and CXR stable.  2. [facility] will call or send lab results to you.  3. Call [facility] with any changes.    Next transplant clinic appointment:  6 months with CXR, labs and PFTs  Next lab draw: every 2 months      AVS printed at time of check out

## 2018-02-22 NOTE — MR AVS SNAPSHOT
After Visit Summary   2/22/2018    Aubrey Duncan    MRN: [id number]           Patient Information     Date Of Birth          1955        Visit Information        Provider Department      2/22/2018 1:00 PM SAMMI Luz MD [facility]        Today's Diagnoses     Keratosis, actinic    -  1      Care Instructions    - Call us if you develop rapidly growing or painful lesions  - Efudex cream: Use once per day for 10 days on areas of scalp, forehead and face that are scaled and gritty (one month on the central forehead). Avoid eyes.      Actinic Keratosis     Actinic keratosis is a small, rough, raised area on your skin. Often this area has been exposed to the sun for a long period of time.  Some actinic keratoses may develop into a type of skin cancer.  Causes  Actinic keratosis is caused by exposure to sunlight.   You are more likely to develop it if you:  Have fair skin, blue or green eyes, or blond or red hair   Had a kidney or other organ transplant   Take medicines that suppress the immune system   Spend a lot of time each day in the sun (for example, if you work outdoors)   Had many severe sunburns early in life   Are older   Symptoms  Actinic keratosis is usually found on the face, scalp, back of the hands, chest, or places that are often in the sun.   The skin changes begin as flat and scaly areas. They often have a white or yellow crusty scale on top.   The growths may be gray, pink, red, or the same color as your skin. Later they may become hard and wart-like or gritty and rough.   The affected areas may be easier to feel than see.  Exams and Tests  Your health care provider will look at your skin to diagnose this condition. A skin biopsy may be done to see if it is cancer.   Treatment  Some actinic keratoses become squamous cell skin cancer. Have your health care provider look at all skin growths as soon as you find them. Your provider will tell you how to treat them.    Growths may be removed by:  Burning (electrical cautery)   Scraping away the lesion and using electricity to kill any remaining cells (called curettage and electrodesiccation)   Cutting the tumor out and using stitches to place the skin back together (called excision)   Freezing (cryotherapy, which freezes and kills the cells)   If you have many of these skin growths, your doctor may recommend:   A treatment called photodynamic therapy   Chemical peels   Skin creams such as 5-fluorouracil (5-FU) and imiquimod   [place] (Prognosis)  A small number of these skin growths turn into a type of skin cancer called squamous cell carcinoma.   When to Contact a Medical Professional  Call your health care provider if you see or feel a rough or scaly spot on your skin, or if you notice any other skin changes.  Prevention  The best way to lower your risk for actinic keratosis and skin cancer is to learn how to protect your skin from sun and ultraviolet (UV) light.  Things you can do to lower your exposure to sunlight include:  Wear clothing such as hats, long-sleeved shirts, long skirts, or pants.   Try to avoid being in the sun during midday, when ultraviolet light is most intense.   Use high-quality sunscreens, preferably with a sun protection factor (SPF) rating of at least 15. Pick a sunscreen that blocks both UVA and UVB light.   Apply sunscreen before going out into the sun, and reapply often.   Use sunscreen year-round, including in the winter.   Avoid sun lamps, tanning beds, and tanning salons.   Other things to know about sun exposure:  Sun exposure is stronger in or near surfaces that reflect light, such as water, sand, concrete, and areas painted white.   Sunlight is more intense at the beginning of the summer.   Skin burns faster at higher altitudes.   Alternative Names  Solar keratosis; Sun-induced skin changes - keratosis; Keratosis - actinic(solar)                  Follow-ups after your visit        Follow-up  notes from your care team     Return in about 6 months (around 8/22/2018).      Your next 10 appointments already scheduled     Aug 28, 2018  8:30 AM CDT   Lab with  LAB   [facility] ([facility])    [address] [place] [id number] [phone]            Aug 28, 2018  8:45 AM CDT   (Arrive by 8:30 AM)   XR CHEST 2 VIEWS with UCXR1   [facility] Xray ([facility])    [address] [place] [id number] [phone]           Please bring a list of your current medicines to your exam. (Include vitamins, minerals and over-thecounter medicines.) Leave your valuables at home.  Tell your doctor if there is a chance you may be pregnant.  You do not need to do anything special for this exam.            Aug 28, 2018  9:00 AM CDT   PFT VISIT with  PFL A   [facility] Pulmonary Function Testing ([facility])    [address] [place] [id number] [phone]            Aug 28, 2018  9:40 AM CDT   (Arrive by 9:25 AM)   Return Lung Transplant with Kirk Broussard MD   [facility] Solid Organ Transplant ([facility])    [address] [place] [id number] [phone]              Who to contact     Please call your clinic at [phone] to:    Ask questions about your health    Make or cancel appointments    Discuss your medicines    Learn about your test results    Speak to your doctor            Additional Information About Your Visit        Sundeep Information     Sundeep gives you secure access to your electronic health record. If you see a primary care provider, you can also send messages to your care team and make appointments. If you have questions, please call your primary care clinic.  If you do not have a primary care provider, please call [phone] and they will assist you.      Sundeep is an  electronic gateway that provides easy, online access to your medical records. With [employer], you can request a clinic appointment, read your test results, renew a prescription or communicate with your care team.     To access your existing account, please contact your [facility] or call [phone] for assistance.        Care EveryWhere ID     This is your Care EveryWhere ID. This could be used by other organizations to access your [place] medical records  [id number]         Blood Pressure from Last 3 Encounters:   No data found for BP    Weight from Last 3 Encounters:   No data found for Wt              Today, you had the following     No orders found for display         Today's Medication Changes          These changes are accurate as of 2/22/18 11:59 PM.  If you have any questions, ask your nurse or doctor.               Start taking these medicines.        Dose/Directions    fluorouracil 5 % cream   Commonly known as:  EFUDEX   Used for:  Keratosis, actinic   Started by:  SAMMI Luz MD        Apply topically daily Use once per day for 10 days on areas of scalp, forehead and face that are scaled and gritty (one month on the central forehead). Avoid eyes.   Quantity:  40 g   Refills:  1         These medicines have changed or have updated prescriptions.        Dose/Directions    tacrolimus 1 MG capsule   Commonly known as:  GENERIC EQUIVALENT   This may have changed:  additional instructions   Used for:  Lung transplant status, bilateral (H)        Take 3 caps every am and 3 mg every pm.   Quantity:  180 capsule   Refills:  12            Where to get your medicines      Some of these will need a paper prescription and others can be bought over the counter.  Ask your nurse if you have questions.     Bring a paper prescription for each of these medications     fluorouracil 5 % cream                Primary Care Provider Office Phone # Fax #    David Jiang MD  [phone]       [address]   [place] [address]        Equal Access to Services     MICHAEL CANTOR : Hadii aad ku hadabigailniurka Aramisali, ulises leemaxha, chelsea aldrich, daniel pollyin hayaajens acevedotayla messermaureen manuelDontalydia machadoBina [facility] [phone].    ATENCIÓN: Si habla español, tiene a neely disposición servicios gratuitos de asistencia lingüística. Llame al [phone].    We comply with applicable federal civil rights laws and [place] laws. We do not discriminate on the basis of race, color, national origin, age, disability, sex, sexual orientation, or gender identity.            Thank you!     Thank you for choosing [facility]  for your care. Our goal is always to provide you with excellent care. Hearing back from our patients is one way we can continue to improve our services. Please take a few minutes to complete the written survey that you may receive in the mail after your visit with us. Thank you!             Your Updated Medication List - Protect others around you: Learn how to safely use, store and throw away your medicines at www.disposemymeds.org.          This list is accurate as of 2/22/18 11:59 PM.  Always use your most recent med list.                   Brand Name Dispense Instructions for use Diagnosis    albuterol 108 (90 BASE) MCG/ACT Inhaler    PROAIR HFA/PROVENTIL HFA/VENTOLIN HFA    3 Inhaler    Inhale 2 puffs into the lungs every 6 hours as needed for shortness of breath / dyspnea or wheezing    Wheezing       ALLEVYN GENTLE BORDER LITE Pads           azaTHIOprine 50 MG tablet    IMURAN    15 tablet    Take 0.5 tablets (25 mg) by mouth daily    Lung replaced by transplant (H)       Calcium Alginate Powd           calcium-vitamin D 600-400 MG-UNIT per tablet    CALTRATE    60 tablet    Take 1 tablet by mouth 2 times daily (with meals)    Lung transplant status, bilateral (H)       Cotton Swabs Swab      As directed. Use to apply ointment with Daily dressing change         fluorouracil 5 % cream    EFUDEX    40 g    Apply topically daily Use once per day for 10 days on areas of scalp, forehead and face that are scaled and gritty (one month on the central forehead). Avoid eyes.    Keratosis, actinic       furosemide 20 MG tablet    LASIX    30 tablet    TAKE ONE TABLET BY MOUTH EVERY DAY    Hypertension       LISINOPRIL PO      Take 5 mg by mouth daily        loperamide 2 MG capsule    IMODIUM    120 capsule    Take 1 capsule (2 mg) by mouth 4 times daily as needed for diarrhea    Lung transplant status, bilateral (H)       metoprolol tartrate 25 MG tablet    LOPRESSOR    60 tablet    TAKE ONE TABLET BY MOUTH TWICE A DAY    Hypertension       multivitamin, therapeutic Tabs tablet     30 tablet    Take 1 tablet by mouth daily    Lung transplant status, bilateral (H)       omeprazole 20 MG CR capsule    priLOSEC    60 capsule    Take 1 capsule (20 mg) by mouth 2 times daily (before meals)    Lung transplant status, bilateral (H)       predniSONE 5 MG tablet    DELTASONE    45 tablet    TAKE ONE TABLET BY MOUTH EVERY MORNING AND TAKE ONE-HALF TABLET BY MOUTH EVERY EVENING    Lung transplant status, bilateral (H)       silver sulfADIAZINE 1 % cream    SILVADENE     Apply twice daily with dressing changes until healed        sulfamethoxazole-trimethoprim 400-80 MG per tablet    BACTRIM/SEPTRA    12 tablet    TAKE ONE TABLET BY MOUTH THREE TIMES A WEEK    Lung replaced by transplant (H)       tacrolimus 1 MG capsule    GENERIC EQUIVALENT    180 capsule    Take 3 caps every am and 3 mg every pm.    Lung transplant status, bilateral (H)       valGANciclovir 450 MG tablet    VALCYTE    60 tablet    TAKE ONE TABLETS (450MG) BY MOUTH TWO TIMES A DAY    CMV (cytomegalovirus infection) (H), Lung replaced by transplant (H)
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
## 2018-02-22 NOTE — PATIENT INSTRUCTIONS
- Call us if you develop rapidly growing or painful lesions  - Efudex cream: Use once per day for 10 days on areas of scalp, forehead and face that are scaled and gritty (one month on the central forehead). Avoid eyes.      Actinic Keratosis     Actinic keratosis is a small, rough, raised area on your skin. Often this area has been exposed to the sun for a long period of time.  Some actinic keratoses may develop into a type of skin cancer.  Causes  Actinic keratosis is caused by exposure to sunlight.   You are more likely to develop it if you:  Have fair skin, blue or green eyes, or blond or red hair   Had a kidney or other organ transplant   Take medicines that suppress the immune system   Spend a lot of time each day in the sun (for example, if you work outdoors)   Had many severe sunburns early in life   Are older   Symptoms  Actinic keratosis is usually found on the face, scalp, back of the hands, chest, or places that are often in the sun.   The skin changes begin as flat and scaly areas. They often have a white or yellow crusty scale on top.   The growths may be gray, pink, red, or the same color as your skin. Later they may become hard and wart-like or gritty and rough.   The affected areas may be easier to feel than see.  Exams and Tests  Your health care provider will look at your skin to diagnose this condition. A skin biopsy may be done to see if it is cancer.   Treatment  Some actinic keratoses become squamous cell skin cancer. Have your health care provider look at all skin growths as soon as you find them. Your provider will tell you how to treat them.   Growths may be removed by:  Burning (electrical cautery)   Scraping away the lesion and using electricity to kill any remaining cells (called curettage and electrodesiccation)   Cutting the tumor out and using stitches to place the skin back together (called excision)   Freezing (cryotherapy, which freezes and kills the cells)   If you have many of  these skin growths, your doctor may recommend:   A treatment called photodynamic therapy   Chemical peels   Skin creams such as 5-fluorouracil (5-FU) and imiquimod   [place] (Prognosis)  A small number of these skin growths turn into a type of skin cancer called squamous cell carcinoma.   When to Contact a Medical Professional  Call your health care provider if you see or feel a rough or scaly spot on your skin, or if you notice any other skin changes.  Prevention  The best way to lower your risk for actinic keratosis and skin cancer is to learn how to protect your skin from sun and ultraviolet (UV) light.  Things you can do to lower your exposure to sunlight include:  Wear clothing such as hats, long-sleeved shirts, long skirts, or pants.   Try to avoid being in the sun during midday, when ultraviolet light is most intense.   Use high-quality sunscreens, preferably with a sun protection factor (SPF) rating of at least 15. Pick a sunscreen that blocks both UVA and UVB light.   Apply sunscreen before going out into the sun, and reapply often.   Use sunscreen year-round, including in the winter.   Avoid sun lamps, tanning beds, and tanning salons.   Other things to know about sun exposure:  Sun exposure is stronger in or near surfaces that reflect light, such as water, sand, concrete, and areas painted white.   Sunlight is more intense at the beginning of the summer.   Skin burns faster at higher altitudes.   Alternative Names  Solar keratosis; Sun-induced skin changes - keratosis; Keratosis - actinic(solar)
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
## 2018-02-22 NOTE — NURSING NOTE
"Chief Complaint   Patient presents with     RECHECK     6 month follow up.        Initial /76  Pulse 68  Ht 1.702 m (5' 7\")  Wt 75.3 kg (166 lb)  SpO2 99%  BMI 26 kg/m2 Estimated body mass index is 26 kg/(m^2) as calculated from the following:    Height as of this encounter: 1.702 m (5' 7\").    Weight as of this encounter: 75.3 kg (166 lb).  Medication Reconciliation: complete    "

## 2018-02-22 NOTE — PATIENT INSTRUCTIONS
Patient Instructions  1. pft's and CXR stable.  2. [facility] will call or send lab results to you.  3. Call [facility] with any changes.  4. Repeat CBC with diff in 14 days.    Next transplant clinic appointment:  6 months with CXR, labs and PFTs  Next lab draw: every 2 months.      ~~~~~~~~~~~~~~~~~~~~~~~~~    Thoracic Transplant Office phone [phone], fax [phone]  Office Hours 8:30 - 5:00     For after-hours urgent issues, please dial [phone], and ask to speak with the Thoracic Transplant Coordinator  On-Call, pager [phone].  --------------------  To expedite your medication refill(s), please contact your pharmacy and have them fax a refill request to: [phone].   *Please allow 3 business days for routine medication refills.  *Please allow 5 business days for controlled substance medication refills.    **For Diabetic medications and supplies refill(s), please contact your pharmacy and have them  Contact your Endocrine team.  --------------------  For scheduling appointments call [facility] transplant :  [phone]. For lab appointments call [phone] or [facility].  --------------------  Please Note: If you are active on [employer], all future test results will be sent by [employer] message only, and will no longer be called to patient. You may also receive communication directly from your physician.

## 2018-02-22 NOTE — MR AVS SNAPSHOT
After Visit Summary   2/22/2018    Aubrey Duncan    MRN: [id number]           Patient Information     Date Of Birth          1955        Visit Information        Provider Department      2/22/2018 11:00 AM Kirk Broussard MD [facility] Solid Organ Transplant        Today's Diagnoses     Encounter for long-term current use of medication    -  1    S/P lung transplant (H)        Leukopenia, unspecified type          Care Instructions    Patient Instructions  1. pft's and CXR stable.  2. [facility] will call or send lab results to you.  3. Call [facility] with any changes.  4. Repeat CBC with diff in 14 days.    Next transplant clinic appointment:  6 months with CXR, labs and PFTs  Next lab draw: every 2 months.      ~~~~~~~~~~~~~~~~~~~~~~~~~    Thoracic Transplant Office phone [phone], fax [phone]  Office Hours 8:30 - 5:00     For after-hours urgent issues, please dial [phone], and ask to speak with the Thoracic Transplant Coordinator  On-Call, pager [phone].  --------------------  To expedite your medication refill(s), please contact your pharmacy and have them fax a refill request to: [phone].   *Please allow 3 business days for routine medication refills.  *Please allow 5 business days for controlled substance medication refills.    **For Diabetic medications and supplies refill(s), please contact your pharmacy and have them  Contact your Endocrine team.  --------------------  For scheduling appointments call [facility] :  [phone]. For lab appointments call [phone] or [facility].  --------------------  Please Note: If you are active on [employer], all future test results will be sent by [employer] message only, and will no longer be called to patient. You may also receive communication directly from your physician.          Follow-ups after your visit        Your next 10 appointments already scheduled     Feb 22, 2018  1:00 PM CST   (Arrive by 12:45 PM)   Transplant  Skin Check with SAMMI Luz MD   [facility] ([facility])    [address] [place] [id number] [phone]            Aug 28, 2018  8:30 AM CDT   Lab with  LAB   [facility] ([facility])    [address] [place] [id number] [phone]            Aug 28, 2018  8:45 AM CDT   (Arrive by 8:30 AM)   XR CHEST 2 VIEWS with UCXR1   [facility] ([facility])    [address] [place] [id number] [phone]           Please bring a list of your current medicines to your exam. (Include vitamins, minerals and over-thecounter medicines.) Leave your valuables at home.  Tell your doctor if there is a chance you may be pregnant.  You do not need to do anything special for this exam.            Aug 28, 2018  9:00 AM CDT   PFT VISIT with  PFL A   [facility] ([facility])    [address] [place] [id number] [phone]            Aug 28, 2018  9:40 AM CDT   (Arrive by 9:25 AM)   Return Lung Transplant with Kirk Broussard MD   [facility] ([facility])    [address] [place] [id number] [phone]              Future tests that were ordered for you today     Open Future Orders        Priority Expected Expires Ordered    WBC and differential Routine 2/22/2018 3/24/2018 2/22/2018    Blood Morphology Pathologist Review Routine 2/22/2018 3/24/2018 2/22/2018    Reticulocyte Count Routine 2/22/2018 2/22/2018 2/22/2018    Basic metabolic panel Routine  9/14/2018 2/22/2018    Magnesium Routine  9/14/2018 2/22/2018    CBC with platelets Routine  9/14/2018 2/22/2018    CMV DNA quantification Routine  9/14/2018 2/22/2018    X-ray Chest 2 vws* Routine 2/22/2018 9/14/2018 2/22/2018    Spirometry,  "Breathing Capacity Routine  9/14/2018 2/22/2018    INR Routine  9/14/2018 2/22/2018    Tacrolimus level Routine  9/14/2018 2/22/2018    PRA Donor Specific Antibody Routine  9/14/2018 2/22/2018            Who to contact     If you have questions or need follow up information about today's clinic visit or your schedule please contact [facility] directly at [phone].  Normal or non-critical lab and imaging results will be communicated to you by [employer], letter or phone within 4 business days after the clinic has received the results. If you do not hear from us within 7 days, please contact the clinic through [employer] or phone. If you have a critical or abnormal lab result, we will notify you by phone as soon as possible.  Submit refill requests through [employer] or call your pharmacy and they will forward the refill request to us. Please allow 3 business days for your refill to be completed.          Additional Information About Your Visit        [employer] Information     [employer] gives you secure access to your electronic health record. If you see a primary care provider, you can also send messages to your care team and make appointments. If you have questions, please call your primary care clinic.  If you do not have a primary care provider, please call [phone] and they will assist you.        Care EveryWhere ID     This is your Care EveryWhere ID. This could be used by other organizations to access your [place] medical records  [id number]        Your Vitals Were     Pulse Height Pulse Oximetry BMI (Body Mass Index)          68 1.702 m (5' 7\") 99% 26 kg/m2         Blood Pressure from Last 3 Encounters:   02/22/18 150/76   12/19/17 128/78   11/09/17 142/80    Weight from Last 3 Encounters:   02/22/18 75.3 kg (166 lb)   12/19/17 71.7 kg (158 lb)   11/09/17 71.8 kg (158 lb 4.6 oz)                 Today's Medication Changes          These changes are accurate as of 2/22/18 12:03 PM.  If you " have any questions, ask your nurse or doctor.               These medicines have changed or have updated prescriptions.        Dose/Directions    tacrolimus 1 MG capsule   Commonly known as:  GENERIC EQUIVALENT   This may have changed:  additional instructions   Used for:  Lung transplant status, bilateral (H)        Take 3 caps every am and 3 mg every pm.   Quantity:  180 capsule   Refills:  12                Primary Care Provider Office Phone # Fax #    David Terrell Jiang [phone] [address] [facility] [address]        Equal Access to Services     [place] : Hadii mario ku hadasho Soomaali, waaxda luqadaha, qaybta kaalmada adeegyada, waxay ashley haylydia cedillo . So [facility] [phone].    ATENCIÓN: Si habla español, tiene a neely disposición servicios gratuitos de asistencia lingüística. Llame al [phone].    We comply with applicable federal civil rights laws and [place] laws. We do not discriminate on the basis of race, color, national origin, age, disability, sex, sexual orientation, or gender identity.            Thank you!     Thank you for choosing [facility]  for your care. Our goal is always to provide you with excellent care. Hearing back from our patients is one way we can continue to improve our services. Please take a few minutes to complete the written survey that you may receive in the mail after your visit with us. Thank you!             Your Updated Medication List - Protect others around you: Learn how to safely use, store and throw away your medicines at www.disposemymeds.org.          This list is accurate as of 2/22/18 12:03 PM.  Always use your most recent med list.                   Brand Name Dispense Instructions for use Diagnosis    albuterol 108 (90 BASE) MCG/ACT Inhaler    PROAIR HFA/PROVENTIL HFA/VENTOLIN HFA    3 Inhaler    Inhale 2 puffs into the lungs every 6 hours as needed for shortness of breath /  dyspnea or wheezing    Wheezing       alendronate 70 MG tablet    FOSAMAX    4 tablet    Take 1 tablet (70 mg) by mouth every 7 days Take with 8 ounces water, at least 60 min before breakfast, and stay upright for at least 30 min after dose.    Osteopenia       ALLEVYN GENTLE BORDER LITE Pads           azaTHIOprine 50 MG tablet    IMURAN    15 tablet    Take 0.5 tablets (25 mg) by mouth daily    Lung replaced by transplant (H)       Calcium Alginate Powd           calcium-vitamin D 600-400 MG-UNIT per tablet    CALTRATE    60 tablet    Take 1 tablet by mouth 2 times daily (with meals)    Lung transplant status, bilateral (H)       Cotton Swabs Swab      As directed. Use to apply ointment with Daily dressing change        furosemide 20 MG tablet    LASIX    30 tablet    TAKE ONE TABLET BY MOUTH EVERY DAY    Hypertension       LISINOPRIL PO      Take 5 mg by mouth daily        loperamide 2 MG capsule    IMODIUM    120 capsule    Take 1 capsule (2 mg) by mouth 4 times daily as needed for diarrhea    Lung transplant status, bilateral (H)       metoprolol tartrate 25 MG tablet    LOPRESSOR    60 tablet    TAKE ONE TABLET BY MOUTH TWICE A DAY    Hypertension       multivitamin, therapeutic Tabs tablet     30 tablet    Take 1 tablet by mouth daily    Lung transplant status, bilateral (H)       omeprazole 20 MG CR capsule    priLOSEC    60 capsule    Take 1 capsule (20 mg) by mouth 2 times daily (before meals)    Lung transplant status, bilateral (H)       predniSONE 5 MG tablet    DELTASONE    45 tablet    TAKE ONE TABLET BY MOUTH EVERY MORNING AND TAKE ONE-HALF TABLET BY MOUTH EVERY EVENING    Lung transplant status, bilateral (H)       sildenafil 25 MG tablet    VIAGRA    30 tablet    Take 1 tablet (25 mg) by mouth as needed for erectile dysfunction    ED (erectile dysfunction)       silver sulfADIAZINE 1 % cream    SILVADENE     Apply twice daily with dressing changes until healed        sulfamethoxazole-trimethoprim  400-80 MG per tablet    BACTRIM/SEPTRA    12 tablet    TAKE ONE TABLET BY MOUTH THREE TIMES A WEEK    Lung replaced by transplant (H)       tacrolimus 1 MG capsule    GENERIC EQUIVALENT    180 capsule    Take 3 caps every am and 3 mg every pm.    Lung transplant status, bilateral (H)       valGANciclovir 450 MG tablet    VALCYTE    60 tablet    TAKE ONE TABLETS (450MG) BY MOUTH TWO TIMES A DAY    CMV (cytomegalovirus infection) (H), Lung replaced by transplant (H)

## 2018-02-22 NOTE — NURSING NOTE
Dermatology Rooming Note    Aubrey Duncan's goals for this visit include:   Chief Complaint   Patient presents with     Skin Check     Aubrey is here today for a transplant skin check.      DARRYL Wyatt

## 2018-02-23 ENCOUNTER — TELEPHONE (OUTPATIENT)
Dept: TRANSPLANT | Facility: CLINIC | Age: 63
End: 2018-02-23

## 2018-02-23 ENCOUNTER — TELEPHONE (OUTPATIENT)
Dept: DERMATOLOGY | Facility: CLINIC | Age: 63
End: 2018-02-23

## 2018-02-23 DIAGNOSIS — L57.0 AK (ACTINIC KERATOSIS): Primary | ICD-10-CM

## 2018-02-23 LAB
CMV DNA SPEC NAA+PROBE-ACNC: NORMAL [IU]/ML
CMV DNA SPEC NAA+PROBE-LOG#: NORMAL {LOG_IU}/ML
COPATH REPORT: NORMAL
PRA DONOR SPECIFIC ABY: NORMAL
SPECIMEN SOURCE: NORMAL

## 2018-02-23 RX ORDER — FLUOROURACIL 50 MG/G
CREAM TOPICAL
Qty: 40 G | Refills: 0 | Status: SHIPPED | OUTPATIENT
Start: 2018-02-23 | End: 2018-06-14

## 2018-02-23 NOTE — TELEPHONE ENCOUNTER
Patient has been on Fosamax since 2012, per Dr. Broussard will stop fosamax dosing.  Patient in agreement with plan.

## 2018-02-23 NOTE — TELEPHONE ENCOUNTER
Received medication request for Efudex as the resident on call. Reviewed patient's chart and attached communication. Patient last seen 2/22/18 for FBSE. Plan was for 10 day course of 5-FU for AKs but prescription not yet sent. RTC in 6 moths. After reviewing the medication list and assessment and plan from last visit, the refill request was accepted.    Talia Perales MD  Medicine-Dermatology PGY-4  [phone]

## 2018-02-23 NOTE — TELEPHONE ENCOUNTER
The patient called and stated that the RX for 5FU was not sent into his pharmacy yesterday. The pharmacy is selected below.     Impression/Plan:  1. History of bilateral lung transplant, on immunosuppression: Aubrey is aware that he is at increased risk for skin malignancies and skin infections as a result of his immunosuppression. Hx of SCC on L forearm    No overtly concerning lesions on today's TBSE    Sun precaution was advised including the use of sun screens of SPF 30 or higher, sun protective clothing, and avoidance of tanning beds.    6 month follow-up     2. Actinic keratoses, numerous across forehead, face, and scalp: Has had cryotherapy for these lesions in the past, but the distribution of AK's on exam today precludes the efficacy of individual lesion cryotherapy.     After discussing options, we agreed that a 10 day course of 5-FU (Efudex) would be beneficial given the diffuse spread of Aubrey's AK's and solar lentigines. Discussed the risks and benefits of this medication, including the anticipation of skin irritation, xerosis, and redness following application, but that topical administration should be without systemic side effects.     CC Dr. Kirk Broussard on close of this encounter.  Follow-up in 6 months, earlier for new or changing lesions.      Staff Involved:  Scribed by Rj Melton, MS4 for Dr. Luz.    Sherita Sutherland CMA
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
## 2018-02-26 LAB
DONOR IDENTIFICATION: NORMAL
DSA COMMENTS: NORMAL
DSA PRESENT: NO
DSA TEST METHOD: NORMAL
ORGAN: NORMAL
PROTOCOL CUTOFF: NORMAL
SA1 CELL: NORMAL
SA1 COMMENTS: NORMAL
SA1 HI RISK ABY: NORMAL
SA1 MOD RISK ABY: NORMAL
SA1 TEST METHOD: NORMAL
SA2 CELL: NORMAL
SA2 COMMENTS: NORMAL
SA2 HI RISK ABY UA: NORMAL
SA2 MOD RISK ABY: NORMAL
SA2 TEST METHOD: NORMAL
UNACCEPTABLE ANTIGEN: NORMAL
UNOS CPRA: 0

## 2018-03-06 DIAGNOSIS — N52.9 ED (ERECTILE DYSFUNCTION): ICD-10-CM

## 2018-03-06 RX ORDER — SILDENAFIL 25 MG/1
25 TABLET, FILM COATED ORAL PRN
Qty: 30 TABLET | Refills: 0 | Status: SHIPPED | OUTPATIENT
Start: 2018-03-06 | End: 2019-04-16

## 2018-03-08 RX ORDER — FLUOROURACIL 50 MG/G
CREAM TOPICAL DAILY
Qty: 40 G | Refills: 1 | Status: SHIPPED | OUTPATIENT
Start: 2018-03-08 | End: 2022-02-01

## 2018-03-14 ENCOUNTER — TELEPHONE (OUTPATIENT)
Dept: TRANSPLANT | Facility: CLINIC | Age: 63
End: 2018-03-14

## 2018-03-26 DIAGNOSIS — Z94.2 LUNG TRANSPLANT STATUS, BILATERAL (H): ICD-10-CM

## 2018-03-30 NOTE — TELEPHONE ENCOUNTER
omeprazole (PRILOSEC) 20 MG CR capsule 60 capsule 12 3/26/2018  No   Sig: Take 1 capsule (20 mg) by mouth 2 times daily (before meals)   Class: E-Prescribe   Route: Oral   Order: [id number]   E-Prescribing Status: Receipt confirmed by pharmacy (3/26/2018 10:34 AM CDT)     Filled 03/26/18 #60 x 12. Pharmacy alerted. Unable to complete prescription refill per RNMedication Refill Policy.................... Meeta Akbar ....................  3/30/2018   8:45 AM

## 2018-04-10 ENCOUNTER — TELEPHONE (OUTPATIENT)
Dept: TRANSPLANT | Facility: CLINIC | Age: 63
End: 2018-04-10

## 2018-04-10 NOTE — TELEPHONE ENCOUNTER
Patient stated there is a fax coming to us from [facility] for medication Filvenafil to authorize to be filled there.   Call pt if questions

## 2018-04-12 ENCOUNTER — TELEPHONE (OUTPATIENT)
Dept: TRANSPLANT | Facility: CLINIC | Age: 63
End: 2018-04-12

## 2018-04-23 ENCOUNTER — TELEPHONE (OUTPATIENT)
Dept: TRANSPLANT | Facility: CLINIC | Age: 63
End: 2018-04-23

## 2018-04-23 DIAGNOSIS — Z94.2 LUNG TRANSPLANT STATUS, BILATERAL (H): ICD-10-CM

## 2018-04-23 RX ORDER — TACROLIMUS 1 MG/1
CAPSULE ORAL
Qty: 180 CAPSULE | Refills: 12 | Status: ON HOLD | OUTPATIENT
Start: 2018-04-23 | End: 2019-02-28

## 2018-04-23 NOTE — TELEPHONE ENCOUNTER
Drug Name: tacrolimus 1mg  Last Fill Date: 12/27/17  Quantity: 180    Savanah Rossi   [facility]  [phone]

## 2018-04-23 NOTE — TELEPHONE ENCOUNTER
Patient Call: Patient Call: Medication Refill  Route to LPN  Instruct the patient to first contact their pharmacy. If they have called their pharmacy and require further assistance, route to LPN.  Pharmacy Name: [facility]  Pharmacy Location: [place] [address]   Name of Medication: Tacrolimus Dose: 1mg    Patient is leaving Thursday for a week long vacation and needs the prescription mailed to him on/before Wednesday. Patient called Pharmacy but they need a script sent over.

## 2018-05-21 DIAGNOSIS — I10 ESSENTIAL HYPERTENSION: Primary | ICD-10-CM

## 2018-05-21 RX ORDER — METOPROLOL TARTRATE 25 MG/1
25 TABLET, FILM COATED ORAL 2 TIMES DAILY
Qty: 180 TABLET | Refills: 0 | Status: SHIPPED | OUTPATIENT
Start: 2018-05-21 | End: 2018-06-14

## 2018-05-21 RX ORDER — LISINOPRIL 5 MG/1
5 TABLET ORAL DAILY
Qty: 90 TABLET | Refills: 0 | Status: SHIPPED | OUTPATIENT
Start: 2018-05-21 | End: 2018-06-14

## 2018-05-21 RX ORDER — FUROSEMIDE 20 MG
20 TABLET ORAL DAILY
Qty: 90 TABLET | Refills: 0 | Status: SHIPPED | OUTPATIENT
Start: 2018-05-21 | End: 2018-06-14

## 2018-05-21 NOTE — LETTER
May 21, 2018      Aubrey Duncan  [address]  [place] [address]        Dear Aubrey,     This letter is to remind you that you are due for your annual exam with David Jiang. Your last comprehensive visit was more than 12 months ago.    LIMITED refills of    Furosemide (LASIX) 20 mg tablet  Lisinopril (PRINIVIL/ZESTRIL) 5 mg tablet  Metoprolol tartrate (LOPRESSOR) 25 mg tablet    has been called into your pharmacy. Additional refills require you to complete this appointment.    Please call the clinic at [phone] to schedule your appointment.    If you should require additional refills before your scheduled appointment, please contact your pharmacy and we will refill your medication until the date of your appointment.    If you are no longer seeing David Jiang for primary care, please call to let us know. Doing so will remove you from our call/contact list.      Thank you for choosing [facility] for your health care needs.    Sincerely,    Refill RN  [facility]

## 2018-05-21 NOTE — TELEPHONE ENCOUNTER
No eRx request found.     Paper requests located for Metoprolol and Furosemide, sent on 18 at 3:04 PM.    Called [facility] and spoke with Raeann, after verifying Patient's last name and . Raeann states that they are also looking for an Rx for Linisopril 5 mg.     Refill encounter created and run through protocol. Brooke Nicholas RN .............. 2018  10:57 AM

## 2018-05-21 NOTE — TELEPHONE ENCOUNTER
States that Rx has been faxed numerous times. This is for 3 different meds.  He will be out tomorrow.  Please call

## 2018-05-21 NOTE — TELEPHONE ENCOUNTER
Last OV 10/30/18. Patient was due to return for medication and medication management appointment with Dr. Jiang on 5/11/18. Reminder letter sent to schedule this appointment.     Medications failed due to BP being >140/90 in the past 12 months:          BP Readings from Last 3 Encounters:   02/22/18 150/76   12/19/17 128/78   11/09/17 142/80         Prescription approved per [facility] Refill Protocol. Refill authorized for 90 day imelda refill at this time. Brooke Nicholas RN .............. 5/21/2018  11:14 AM

## 2018-06-14 ENCOUNTER — OFFICE VISIT (OUTPATIENT)
Dept: PEDIATRICS | Facility: OTHER | Age: 63
End: 2018-06-14
Attending: INTERNAL MEDICINE
Payer: MEDICARE

## 2018-06-14 VITALS
HEART RATE: 66 BPM | BODY MASS INDEX: 25.66 KG/M2 | HEIGHT: 67 IN | WEIGHT: 163.5 LBS | SYSTOLIC BLOOD PRESSURE: 128 MMHG | DIASTOLIC BLOOD PRESSURE: 80 MMHG

## 2018-06-14 DIAGNOSIS — I10 ESSENTIAL HYPERTENSION: ICD-10-CM

## 2018-06-14 DIAGNOSIS — K21.9 GASTROESOPHAGEAL REFLUX DISEASE, ESOPHAGITIS PRESENCE NOT SPECIFIED: ICD-10-CM

## 2018-06-14 DIAGNOSIS — Z94.2 LUNG TRANSPLANT STATUS, BILATERAL (H): Primary | ICD-10-CM

## 2018-06-14 DIAGNOSIS — Z12.11 SPECIAL SCREENING FOR MALIGNANT NEOPLASMS, COLON: ICD-10-CM

## 2018-06-14 DIAGNOSIS — E11.9 DIABETES MELLITUS TYPE 2, DIET-CONTROLLED (H): ICD-10-CM

## 2018-06-14 DIAGNOSIS — K57.30 DIVERTICULOSIS OF LARGE INTESTINE WITHOUT HEMORRHAGE: ICD-10-CM

## 2018-06-14 PROCEDURE — G0463 HOSPITAL OUTPT CLINIC VISIT: HCPCS

## 2018-06-14 PROCEDURE — 99214 OFFICE O/P EST MOD 30 MIN: CPT | Performed by: INTERNAL MEDICINE

## 2018-06-14 RX ORDER — FUROSEMIDE 20 MG
20 TABLET ORAL DAILY
Qty: 90 TABLET | Refills: 4 | Status: SHIPPED | OUTPATIENT
Start: 2018-06-14 | End: 2019-06-04

## 2018-06-14 RX ORDER — METOPROLOL TARTRATE 25 MG/1
25 TABLET, FILM COATED ORAL 2 TIMES DAILY
Qty: 180 TABLET | Refills: 4 | Status: SHIPPED | OUTPATIENT
Start: 2018-06-14 | End: 2019-06-04

## 2018-06-14 RX ORDER — LISINOPRIL 5 MG/1
5 TABLET ORAL DAILY
Qty: 90 TABLET | Refills: 4 | Status: SHIPPED | OUTPATIENT
Start: 2018-06-14 | End: 2019-06-04

## 2018-06-14 ASSESSMENT — PAIN SCALES - GENERAL: PAINLEVEL: NO PAIN (0)

## 2018-06-14 NOTE — MR AVS SNAPSHOT
After Visit Summary   6/14/2018    Aubrey Duncan    MRN: [id number]           Patient Information     Date Of Birth          1955        Visit Information        Provider Department      6/14/2018 9:00 AM David Jiang MD [facility]        Today's Diagnoses     Lung transplant status, bilateral (H)    -  1    Essential hypertension        Diabetes mellitus type 2, diet-controlled (H)        Gastroesophageal reflux disease, esophagitis presence not specified        Special screening for malignant neoplasms, colon        Diverticulosis of large intestine without hemorrhage          Care Instructions     -- Make sure you get an A1c and Lipid panel with next draw   -- Consider shingles vaccine   -- Cologuard for colon cancer screening          Follow-ups after your visit        Your next 10 appointments already scheduled     Aug 22, 2018  8:45 AM CDT   LAB with  LAB    Health Lab ([facility])    [address] [place] [id number] [phone]           Please do not eat 10-12 hours before your appointment if you are coming in fasting for labs on lipids, cholesterol, or glucose (sugar). This does not apply to pregnant women. Water, hot tea and black coffee (with nothing added) are okay. Do not drink other fluids, diet soda or chew gum.            Aug 22, 2018  9:00 AM CDT   XR CHEST 2 VIEWS with UCXR1   [facility] Xray ([facility])    [address] [place] [address] [phone] [phone]           Please bring a list of your current medicines to your exam. (Include vitamins, minerals and over-thecounter medicines.) Leave your valuables at home.  Tell your doctor if there is a chance you may be pregnant.  You do not need to do anything special for this exam.            Aug 22, 2018  9:30 AM CDT   PFT VISIT with  [facility] Pulmonary Function Testing (  [facility])    [address] [place] [phone]   [phone]            Aug 22, 2018 10:00 AM CDT   (Arrive by 9:45 AM)   Return Lung Transplant with Kirk Broussard MD   [facility] Solid Organ Transplant ([facility])    [address] [place] [id number]   [phone]            Aug 22, 2018  1:00 PM CDT   (Arrive by 12:45 PM)   Transplant Skin Check with SAMMI Luz MD   [facility] Dermatology ([facility])    [address] [place] [phone]   [phone]              Future tests that were ordered for you today     Open Future Orders        Priority Expected Expires Ordered    Hemoglobin A1c Routine  6/13/2019 6/14/2018    Lipid Profile Routine  6/13/2019 6/14/2018    Basic metabolic panel Routine  6/13/2019 6/14/2018            Who to contact     If you have questions or need follow up information about today's clinic visit or your schedule please contact [facility] directly at [phone].  Normal or non-critical lab and imaging results will be communicated to you by Vatorhart, letter or phone within 4 business days after the clinic has received the results. If you do not hear from us within 7 days, please contact the clinic through Vatorhart or phone. If you have a critical or abnormal lab result, we will notify you by phone as soon as possible.  Submit refill requests through YouFig or call your pharmacy and they will forward the refill request to us. Please allow 3 business days for your refill to be completed.          Additional Information About Your Visit        MyChart Information     YouFig gives you secure access to your electronic health record. If you see a primary care provider, you can also send messages to your care team and make appointments. If you have questions, please call your primary care clinic.  If you do  "not have a primary care provider, please call [phone] and they will assist you.        Care EveryWhere ID     This is your Care EveryWhere ID. This could be used by other organizations to access your [facility] medical records  [id number]        Your Vitals Were     Pulse Height BMI (Body Mass Index)             66 5' 7\" (1.702 m) 25.61 kg/m2          Blood Pressure from Last 3 Encounters:   06/14/18 142/74   02/22/18 150/76   12/19/17 128/78    Weight from Last 3 Encounters:   06/14/18 163 lb 8 oz (74.2 kg)   02/22/18 166 lb (75.3 kg)   12/19/17 158 lb (71.7 kg)                 Where to get your medicines      These medications were sent to [facility] - [place], [place] - [address]   [address] , [facility] [address]    Hours:  Mon-Fri 8:30am-5:00pm Toll Free [phone] Phone:  [phone]     furosemide 20 MG tablet    lisinopril 5 MG tablet    metoprolol tartrate 25 MG tablet    omeprazole 20 MG CR capsule          Primary Care Provider Office Phone # Fax #    David Terrell Jiang [phone] [address] [facility] [address]        Equal Access to Services     JANET CANTOR AH: Hadii mario anderson hadasho Soendy, waaxda luqadaha, qaybta kaalmada valdemar, daniel machado. So [facility] [phone].    ATENCIÓN: Si habla español, tiene a neely disposición servicios gratuitos de asistencia lingüística. Alhaji al [phone].    We comply with applicable federal civil rights laws and [place] laws. We do not discriminate on the basis of race, color, national origin, age, disability, sex, sexual orientation, or gender identity.            Thank you!     Thank you for choosing [facility]  for your care. Our goal is always to provide you with excellent care. Hearing back from our patients is one way we can continue to improve our services. Please take a few minutes to complete the written survey " that you may receive in the mail after your visit with us. Thank you!             Your Updated Medication List - Protect others around you: Learn how to safely use, store and throw away your medicines at www.disposemymeds.org.          This list is accurate as of 6/14/18  9:15 AM.  Always use your most recent med list.                   Brand Name Dispense Instructions for use Diagnosis    albuterol 108 (90 Base) MCG/ACT Inhaler    PROAIR HFA/PROVENTIL HFA/VENTOLIN HFA    3 Inhaler    Inhale 2 puffs into the lungs every 6 hours as needed for shortness of breath / dyspnea or wheezing    Wheezing       azaTHIOprine 50 MG tablet    IMURAN    15 tablet    Take 0.5 tablets (25 mg) by mouth daily    Lung replaced by transplant (H)       calcium-vitamin D 600-400 MG-UNIT per tablet    CALTRATE    60 tablet    Take 1 tablet by mouth 2 times daily (with meals)    Lung transplant status, bilateral (H)       fluorouracil 5 % cream    EFUDEX    40 g    Apply topically daily Use once per day for 10 days on areas of scalp, forehead and face that are scaled and gritty (one month on the central forehead). Avoid eyes.    Keratosis, actinic       furosemide 20 MG tablet    LASIX    90 tablet    Take 1 tablet (20 mg) by mouth daily    Essential hypertension       lisinopril 5 MG tablet    PRINIVIL/ZESTRIL    90 tablet    Take 1 tablet (5 mg) by mouth daily    Essential hypertension       loperamide 2 MG capsule    IMODIUM    120 capsule    Take 1 capsule (2 mg) by mouth 4 times daily as needed for diarrhea    Lung transplant status, bilateral (H)       metoprolol tartrate 25 MG tablet    LOPRESSOR    180 tablet    Take 1 tablet (25 mg) by mouth 2 times daily    Essential hypertension       multivitamin, therapeutic Tabs tablet     30 tablet    Take 1 tablet by mouth daily    Lung transplant status, bilateral (H)       omeprazole 20 MG CR capsule    priLOSEC    180 capsule    Take 1 capsule (20 mg) by mouth 2 times daily (before meals)     Lung transplant status, bilateral (H)       predniSONE 5 MG tablet    DELTASONE    45 tablet    TAKE ONE TABLET BY MOUTH EVERY MORNING AND TAKE ONE-HALF TABLET BY MOUTH EVERY EVENING    Lung transplant status, bilateral (H)       sildenafil 25 MG tablet    VIAGRA    30 tablet    Take 1 tablet (25 mg) by mouth as needed    ED (erectile dysfunction)       sulfamethoxazole-trimethoprim 400-80 MG per tablet    BACTRIM/SEPTRA    12 tablet    TAKE ONE TABLET BY MOUTH THREE TIMES A WEEK    Lung replaced by transplant (H)       tacrolimus 1 MG capsule    GENERIC EQUIVALENT    180 capsule    Take 3 caps every am and 3 caps every pm.    Lung transplant status, bilateral (H)       valGANciclovir 450 MG tablet    VALCYTE    60 tablet    TAKE ONE TABLETS (450MG) BY MOUTH TWO TIMES A DAY    CMV (cytomegalovirus infection) (H), Lung replaced by transplant (H)

## 2018-06-14 NOTE — PROGRESS NOTES
Subjective  Aubrey Duncan is a 62 year old male who presents for medication management.  He has a history of alpha-1 antitrypsin deficiency and COPD status post lung transplant, follows with Dr. Kirk Broussard at the [facility].  He continues on immunosuppression with prednisone, tacrolimus, azathioprine and is on prophylaxis with Bactrim and valganciclovir.  History of hypertension which has been well-controlled with lisinopril, Lasix, metoprolol.  History of gastroesophageal reflux disease which is stable with omeprazole.  He has a history of diabetes mellitus type 2 which has been diet-controlled.  He tells me his specialist indicated it was triggered by his medications.  Last colonoscopy was attempted 3 years ago but he said he could not have it because of his lung transplant so they underwent barium enema which was normal.  No change in nocturnal awakenings for urination.  No angina.  No dyspnea on exertion.  He has had some chest pain across the upper part of his chest ever since he had his lung transplant which is chronic and unchanged.    Problem List/PMH: reviewed in EMR, and made relevant updates today.  Medications: reviewed in EMR, and made relevant updates today.  Allergies: reviewed in EMR, and made relevant updates today.    Social Hx:  Social History   Substance Use Topics     Smoking status: Former Smoker     Packs/day: 2.00     Years: 15.00     Types: Cigarettes     Quit date: 1986     Smokeless tobacco: Never Used     Alcohol use No     Social History     Social History Narrative     I reviewed social history and made relevant updates today.    Family Hx:   Family History   Problem Relation Age of Onset     Heart Failure Mother       with CHF at age 95     Asthma Mother      C.A.D. Mother      Asthma Sister      DIABETES Sister      Hypertension Sister      Hypertension Daughter      Other - See Comments Sister      bleeding disorder     Other - See Comments Daughter       "fibromyalgia     CEREBROVASCULAR DISEASE Father       at age 83 with ministrokes; had arthritis as a farmer     Skin Cancer No family hx of        Objective  Vitals: reviewed in EMR.  /74 (BP Location: Right arm, Patient Position: Sitting, Cuff Size: Adult Large)  Pulse 66  Ht 5' 7\" (1.702 m)  Wt 163 lb 8 oz (74.2 kg)  BMI 25.61 kg/m2    Gen: Pleasant male, NAD.  HEENT: MMM, no OP erythema.   Neck: Supple, no JVD, no bruits.  CV: RRR no m/r/g.   Pulm: CTAB no w/r/r  Neuro: Grossly intact  Msk: No lower extremity edema.  Skin: No concerning lesions.  Psychiatric: Normal affect and insight. Does not appear anxious or depressed.    Labs:  Lab Results   Component Value Date    WBC 3.5 (L) 2018    HGB 13.1 (L) 2018    HCT 40.4 2018     2018    CHOL 183 10/04/2017    TRIG 308 (H) 10/04/2017    HDL 40 10/04/2017    ALT 76 (H) 2016    AST 37 2016     2018    BUN 32 (H) 2018    CO2 29 2018    TSH 1.33 2012    PSA 0.45 2012    INR 1.07 2018     Study Result   Procedure: XR COLON     HISTORY:  Eval/screening for neoplasm; .     TECHNIQUE: Contrast was instilled into the colon through rectal tube. Multiple fluoroscopic images were obtained. Fluoroscopy time was 1 minute 32 seconds.     COMPARISON: None.     FINDINGS:     The caliber and mucosal pattern of the colon is unremarkable. No colonic mass or stricture is seen. A few diverticula are present in the sigmoid and descending colon. The appendix is visualized and is normal.     IMPRESSION:  Diverticulosis. Otherwise negative barium enema.     Electronically Signed By: Brooke Melchor M.D. on 5/3/2016 4:34 PM         Assessment    ICD-10-CM    1. Lung transplant status, bilateral (H) Z94.2 omeprazole (PRILOSEC) 20 MG CR capsule   2. Essential hypertension I10 furosemide (LASIX) 20 MG tablet     lisinopril (PRINIVIL/ZESTRIL) 5 MG tablet     metoprolol tartrate (LOPRESSOR) 25 MG " tablet     Basic metabolic panel   3. Diabetes mellitus type 2, diet-controlled (H) E11.9 Hemoglobin A1c     Lipid Profile   4. Gastroesophageal reflux disease, esophagitis presence not specified K21.9    5. Special screening for malignant neoplasms, colon Z12.11    6. Diverticulosis of large intestine without hemorrhage K57.30      Orders Placed This Encounter   Procedures     Hemoglobin A1c     Lipid Profile     Basic metabolic panel       Plan   -- Expected clinical course discussed   -- Medications and their side effects discussed  Patient Instructions    -- Make sure you get an A1c and Lipid panel with next draw   -- Consider shingles vaccine   -- Cologuard for colon cancer screening      Return in about 1 year (around 6/14/2019) for medication management.    Signed, David Jiang MD  Internal Medicine & Pediatrics

## 2018-06-14 NOTE — NURSING NOTE
Patient presents to clinic for annual PX and medication refills.  Ale Eller LPN ....................  6/14/2018   8:45 AM

## 2018-06-14 NOTE — PATIENT INSTRUCTIONS
-- Make sure you get an A1c and Lipid panel with next draw   -- Consider shingles vaccine   -- Cologuard for colon cancer screening

## 2018-06-15 ASSESSMENT — PATIENT HEALTH QUESTIONNAIRE - PHQ9: SUM OF ALL RESPONSES TO PHQ QUESTIONS 1-9: 0

## 2018-06-26 ENCOUNTER — TRANSFERRED RECORDS (OUTPATIENT)
Dept: HEALTH INFORMATION MANAGEMENT | Facility: OTHER | Age: 63
End: 2018-06-26

## 2018-07-02 ENCOUNTER — TELEPHONE (OUTPATIENT)
Dept: PEDIATRICS | Facility: OTHER | Age: 63
End: 2018-07-02

## 2018-07-02 DIAGNOSIS — R19.5 POSITIVE FIT (FECAL IMMUNOCHEMICAL TEST): Primary | ICD-10-CM

## 2018-07-02 NOTE — TELEPHONE ENCOUNTER
Okay to wait for Dr. Jiang's return on Thursday.  He can contact the patient to discuss additional testing needed.

## 2018-07-02 NOTE — TELEPHONE ENCOUNTER
See results in David Jiang MD in box.   Had positive results for Cologuard. Ale Eller LPN ....................  7/2/2018   11:23 AM

## 2018-07-02 NOTE — TELEPHONE ENCOUNTER
Patient had positive Cologaurd results.  Just checking to see if you got results. If you have not gotten the results please call the [facility] lab back.  Vanda Madrigal LPN .......7/2/2018 11:27 AM

## 2018-07-02 NOTE — TELEPHONE ENCOUNTER
I checked and we do have the results that they sent us. Dr. Jiang is out until Thursday, will check with doc of day to see if this can wait for his return.   Vanda Madrigal LPN .......7/2/2018 11:33 AM

## 2018-07-05 NOTE — TELEPHONE ENCOUNTER
Called Mr Duncan. Relayed positive Cologuard.      ICD-10-CM    1. Positive FIT (fecal immunochemical test) R19.5 GASTROENTEROLOGY ADULT REF PROCEDURE ONLY      Orders Placed This Encounter   Procedures     GASTROENTEROLOGY ADULT REF PROCEDURE ONLY     Signed, David Jiang MD  Internal Medicine & Pediatrics

## 2018-07-10 DIAGNOSIS — Z12.11 ENCOUNTER FOR SCREENING COLONOSCOPY: Primary | ICD-10-CM

## 2018-07-10 RX ORDER — POLYETHYLENE GLYCOL 3350, SODIUM CHLORIDE, SODIUM BICARBONATE, POTASSIUM CHLORIDE 420; 11.2; 5.72; 1.48 G/4L; G/4L; G/4L; G/4L
4000 POWDER, FOR SOLUTION ORAL ONCE
Qty: 4000 ML | Refills: 0 | Status: SHIPPED | OUTPATIENT
Start: 2018-07-10 | End: 2019-02-19

## 2018-07-10 RX ORDER — BISACODYL 5 MG/1
TABLET, DELAYED RELEASE ORAL
Qty: 2 TABLET | Refills: 0 | Status: SHIPPED | OUTPATIENT
Start: 2018-07-10 | End: 2019-02-24

## 2018-07-10 NOTE — TELEPHONE ENCOUNTER
Screening Questions for the Scheduling of Screening Colonoscopies   (If Colonoscopy is diagnostic, Provider should review the chart before scheduling.)  Are you younger than 50 or older than 80?  NO   Do you take aspirin or fish oil?   NO  (if yes, tell patient to stop 1 week prior to Colonoscopy)  Do you take warfarin (Coumadin), clopidogrel (Plavix), apixaban (Eliquis), dabigatram (Pradaxa), rivaroxaban (Xarelto) or any blood thinner?  NO   Do you use oxygen at home?    NO   Do you have kidney disease?   NO   Are you on dialysis?   NO   Have you had a stroke or heart attack in the last year?   NO   Have you had a stent in your heart or any blood vessel in the last year?   NO   Have you had a transplant of any organ?   LUNG TRANSPLANT  5 YRS AGO   Have you had a colonoscopy or upper endoscopy (EGD) before?   YES          When?  15 - 20 YRS AGO   Date of scheduled Colonoscopy.  08/17/2018  Provider  [facility]   [facility]   [facility]

## 2018-07-23 NOTE — PROGRESS NOTES
Patient Information     Patient Name  Aubrey Duncan MRN  [id number] Sex  Male   1955      Letter by Raeann Thomas MD at      Author:  Raeann Thomas MD Service:  (none) Author Type:  (none)    Filed:   Encounter Date:  2017 Status:  (Other)           Aubrey Duncan  [address]  [place] [address]          2017    Dear Mr. Duncan:    This letter is to remind you that you are due for your annual exam. Your last comprehensive visit was more than 12 months ago.    A LIMITED refill of omeprazole (PRILOSEC) 20 mg Delayed-Release capsule has been called into your pharmacy. Additional refills require you to complete this appointment.    In the absence of Raeann Thomas MD, we recommend that you establish care with one of our other providers. Please call the clinic at [phone] to schedule your appointment.    Thank you for choosing [facility] and [facility] for your health care needs.    Sincerely,    Refill RN  [facility]

## 2018-08-17 ENCOUNTER — ANESTHESIA (OUTPATIENT)
Dept: SURGERY | Facility: OTHER | Age: 63
End: 2018-08-17
Payer: MEDICARE

## 2018-08-17 ENCOUNTER — HOSPITAL ENCOUNTER (OUTPATIENT)
Facility: OTHER | Age: 63
Discharge: HOME | End: 2018-08-17
Attending: SURGERY | Admitting: SURGERY
Payer: MEDICARE

## 2018-08-17 ENCOUNTER — ANESTHESIA EVENT (OUTPATIENT)
Dept: SURGERY | Facility: OTHER | Age: 63
End: 2018-08-17
Payer: MEDICARE

## 2018-08-17 ENCOUNTER — SURGERY (OUTPATIENT)
Age: 63
End: 2018-08-17

## 2018-08-17 VITALS
SYSTOLIC BLOOD PRESSURE: 157 MMHG | DIASTOLIC BLOOD PRESSURE: 80 MMHG | RESPIRATION RATE: 18 BRPM | OXYGEN SATURATION: 100 % | HEIGHT: 68 IN | TEMPERATURE: 95.6 F | WEIGHT: 154.6 LBS | BODY MASS INDEX: 23.43 KG/M2

## 2018-08-17 PROCEDURE — 25000125 ZZHC RX 250: Performed by: NURSE ANESTHETIST, CERTIFIED REGISTERED

## 2018-08-17 PROCEDURE — 88305 TISSUE EXAM BY PATHOLOGIST: CPT

## 2018-08-17 PROCEDURE — 45385 COLONOSCOPY W/LESION REMOVAL: CPT | Mod: PT | Performed by: SURGERY

## 2018-08-17 PROCEDURE — 27210995 ZZH RX 272: Performed by: SURGERY

## 2018-08-17 PROCEDURE — 25000132 ZZH RX MED GY IP 250 OP 250 PS 637: Mod: GY | Performed by: SURGERY

## 2018-08-17 PROCEDURE — 25000128 H RX IP 250 OP 636: Performed by: SURGERY

## 2018-08-17 PROCEDURE — 25000128 H RX IP 250 OP 636: Performed by: NURSE ANESTHETIST, CERTIFIED REGISTERED

## 2018-08-17 PROCEDURE — 40000010 ZZH STATISTIC ANES STAT CODE-CRNA PER MINUTE: Performed by: SURGERY

## 2018-08-17 PROCEDURE — 45385 COLONOSCOPY W/LESION REMOVAL: CPT | Performed by: SURGERY

## 2018-08-17 PROCEDURE — 45385 COLONOSCOPY W/LESION REMOVAL: CPT | Performed by: NURSE ANESTHETIST, CERTIFIED REGISTERED

## 2018-08-17 RX ORDER — ONDANSETRON 2 MG/ML
4 INJECTION INTRAMUSCULAR; INTRAVENOUS
Status: DISCONTINUED | OUTPATIENT
Start: 2018-08-17 | End: 2018-08-17 | Stop reason: HOSPADM

## 2018-08-17 RX ORDER — FLUMAZENIL 0.1 MG/ML
0.2 INJECTION, SOLUTION INTRAVENOUS
Status: CANCELLED | OUTPATIENT
Start: 2018-08-17 | End: 2018-08-17

## 2018-08-17 RX ORDER — PROPOFOL 10 MG/ML
INJECTION, EMULSION INTRAVENOUS CONTINUOUS PRN
Status: DISCONTINUED | OUTPATIENT
Start: 2018-08-17 | End: 2018-08-17

## 2018-08-17 RX ORDER — LIDOCAINE 40 MG/G
CREAM TOPICAL
Status: DISCONTINUED | OUTPATIENT
Start: 2018-08-17 | End: 2018-08-17 | Stop reason: HOSPADM

## 2018-08-17 RX ORDER — SIMETHICONE
LIQUID (ML) MISCELLANEOUS PRN
Status: DISCONTINUED | OUTPATIENT
Start: 2018-08-17 | End: 2018-08-17 | Stop reason: HOSPADM

## 2018-08-17 RX ORDER — NALOXONE HYDROCHLORIDE 0.4 MG/ML
.1-.4 INJECTION, SOLUTION INTRAMUSCULAR; INTRAVENOUS; SUBCUTANEOUS
Status: CANCELLED | OUTPATIENT
Start: 2018-08-17 | End: 2018-08-18

## 2018-08-17 RX ORDER — LIDOCAINE HYDROCHLORIDE 20 MG/ML
INJECTION, SOLUTION INFILTRATION; PERINEURAL PRN
Status: DISCONTINUED | OUTPATIENT
Start: 2018-08-17 | End: 2018-08-17

## 2018-08-17 RX ORDER — SODIUM CHLORIDE, SODIUM LACTATE, POTASSIUM CHLORIDE, CALCIUM CHLORIDE 600; 310; 30; 20 MG/100ML; MG/100ML; MG/100ML; MG/100ML
INJECTION, SOLUTION INTRAVENOUS CONTINUOUS
Status: CANCELLED | OUTPATIENT
Start: 2018-08-17

## 2018-08-17 RX ORDER — SODIUM CHLORIDE, SODIUM LACTATE, POTASSIUM CHLORIDE, CALCIUM CHLORIDE 600; 310; 30; 20 MG/100ML; MG/100ML; MG/100ML; MG/100ML
INJECTION, SOLUTION INTRAVENOUS CONTINUOUS
Status: DISCONTINUED | OUTPATIENT
Start: 2018-08-17 | End: 2018-08-17 | Stop reason: HOSPADM

## 2018-08-17 RX ORDER — PROPOFOL 10 MG/ML
INJECTION, EMULSION INTRAVENOUS PRN
Status: DISCONTINUED | OUTPATIENT
Start: 2018-08-17 | End: 2018-08-17

## 2018-08-17 RX ADMIN — PROPOFOL 60 MG: 10 INJECTION, EMULSION INTRAVENOUS at 07:35

## 2018-08-17 RX ADMIN — SODIUM CHLORIDE, SODIUM LACTATE, POTASSIUM CHLORIDE, AND CALCIUM CHLORIDE: 600; 310; 30; 20 INJECTION, SOLUTION INTRAVENOUS at 07:14

## 2018-08-17 RX ADMIN — WATER 250 ML: 100 IRRIGANT IRRIGATION at 07:45

## 2018-08-17 RX ADMIN — LIDOCAINE HYDROCHLORIDE 40 MG: 20 INJECTION, SOLUTION INFILTRATION; PERINEURAL at 07:35

## 2018-08-17 RX ADMIN — Medication 1.5 ML: at 07:44

## 2018-08-17 RX ADMIN — PROPOFOL 140 MCG/KG/MIN: 10 INJECTION, EMULSION INTRAVENOUS at 07:35

## 2018-08-17 NOTE — ANESTHESIA POSTPROCEDURE EVALUATION
Patient: Aubrey Duncan    Procedure(s):  Colonoscopy - Wound Class: III-Contaminated    Diagnosis:positive cologuard  Diagnosis Additional Information: No value filed.    Anesthesia Type:  MAC    Note:  Anesthesia Post Evaluation    Patient location during evaluation: Phase 2  Patient participation: Able to fully participate in evaluation  Level of consciousness: awake and alert  Pain management: adequate  Airway patency: patent  Cardiovascular status: acceptable  Respiratory status: acceptable  Hydration status: acceptable  PONV: none             Last vitals:  Vitals:    08/17/18 0658   BP: (!) 142/98   Resp: 16   Temp: 97.3  F (36.3  C)   SpO2: 98%         Electronically Signed By: BRANDI LOVELL CRNA  August 17, 2018  8:23 AM

## 2018-08-17 NOTE — ANESTHESIA PREPROCEDURE EVALUATION
Anesthesia Evaluation     . Pt has had prior anesthetic. Type: General           ROS/MED HX    ENT/Pulmonary: Comment: Status post lung transplant and is no longer O2 dependent.  Stays active, compliant with meds      Neurologic:  - neg neurologic ROS     Cardiovascular:         METS/Exercise Tolerance:  >4 METS   Hematologic:         Musculoskeletal:         GI/Hepatic:     (+) GERD Asymptomatic on medication,       Renal/Genitourinary: Comment: Treated 6 months ago for kidney stones        Endo: Comment: Diet controlled DM        Psychiatric:         Infectious Disease:  - neg infectious disease ROS       Malignancy:         Other:    - neg other ROS                 Physical Exam  Normal systems: dental    Airway   Mallampati: II  TM distance: >3 FB  Neck ROM: full    Dental     Cardiovascular   Rhythm and rate: regular and normal      Pulmonary    breath sounds clear to auscultation                    Anesthesia Plan      History & Physical Review      ASA Status:  3 .    NPO Status:  > 8 hours    Plan for MAC with Propofol induction.   PONV prophylaxis:  Ondansetron (or other 5HT-3) and Dexamethasone or Solumedrol       Postoperative Care      Consents  Anesthetic plan, risks, benefits and alternatives discussed with:  Patient and Spouse..                          .

## 2018-08-17 NOTE — ANESTHESIA CARE TRANSFER NOTE
Patient: Aubrey Duncan    Procedure(s):  Colonoscopy - Wound Class: III-Contaminated    Diagnosis: positive cologuard  Diagnosis Additional Information: No value filed.    Anesthesia Type:   MAC     Note:  Airway :Room Air  Patient transferred to:Phase II  Handoff Report: Identifed the Patient, Identified the Reponsible Provider, Reviewed the pertinent medical history, Discussed the surgical course, Reviewed Intra-OP anesthesia mangement and issues during anesthesia, Set expectations for post-procedure period and Allowed opportunity for questions and acknowledgement of understanding      Vitals: (Last set prior to Anesthesia Care Transfer)    CRNA VITALS  8/17/2018 0748 - 8/17/2018 0819      8/17/2018             Pulse: 60    Ht Rate: 60    SpO2: 99 %    Resp Rate (set): 10                Electronically Signed By: BRANDI LOVELL CRNA  August 17, 2018  8:19 AM

## 2018-08-17 NOTE — OP NOTE
PROCEDURE NOTE    SURGEON:Quique Olivarez    PRE-OP DIAGNOSIS:  Screening Colonoscopy, Positive Allison guard       POST-OP DIAGNOSIS: Cecal polyp X 1, ascending colon polyp X 2, transverse colon polyp X 1. Diverticulosis. Hemorrhoids.     PROCEDURE:  Colonoscopy with cold snare polypectomy     SPECIMEN: Cecal polyp X 1, ascending colon polyp X 2, transverse colon polyp X 1.     ANESTHESIA:  Monitor Anesthesia Care CRNA Independent: Dolores Pugh APRN CRNA   Coverage requested due to ASA III     ESTIMATED BLOOD LOSS: none    COMPLICATIONS:  None    INDICATION FOR THE PROCEDURE: The patient is a 63 year old male. The patient presents with need for screening and previous positive coloGuard. I explained to the patient the risks, benefits and alternatives to screening colonoscopy for evaluating for cancer or polyps. We specifically discussed the risks of bleeding, infection, perforation, potential inability to reach the cecum and the risks of sedation. The patient's questions were answered and the patient wished to proceed. Informed consent paperwork was completed.    PROCEDURE: The patient was taken to the endoscopy suite. Appropriate monitors were attached. The patient was placed in the left lateral decubitus position.Timeout was performed confirming the patient's identity and procedure to be performed.  After appropriate sedation was confirmed, digital rectal exam was performed.  There was normal tone and no gross abnormality was noted.  The lubricated colonoscope was introduced into the anus the colon was insufflated with air. The prep quality was adequate. Under direct visualization the scope was advanced to the cecum. The mucosa of the colon was inspected while withdrawing the scope. A 3 mm polyp was removed from the appendiceal os in the cecum. Cold snare was also used to remove ascending colon polyp X 2 (4mm and 10 mm), transverse colon polyp X 1 (3 mm).  The scope was retroflexed in the rectum and the anorectal  junction was inspected. One column of hemorrhoids were noted. The scope was returned to aneutral position and the colon was decompressed. The scope was removed. The patient tolerated the procedure with no immediately apparent complication. The patient was taken to recovery in stable condition.    FOLLOW UP: RECOMMEND high fiber diet, will call with pathology results.     Quique Olivarez

## 2018-08-17 NOTE — IP AVS SNAPSHOT
MRN:[id number]                      After Visit Summary   8/17/2018    Aubrey Duncan    MRN: [id number]           Thank you!     Thank you for choosing [facility] for your care. Our goal is always to provide you with excellent care. Hearing back from our patients is one way we can continue to improve our services. Please take a few minutes to complete the written survey that you may receive in the mail after you visit with us. Thank you!        Patient Information     Date Of Birth          1955        Designated Caregiver       Most Recent Value    Caregiver    Will someone help with your care after discharge? yes      About your hospital stay     You were admitted on:  August 17, 2018 You last received care in the:  [facility]    You were discharged on:  August 17, 2018       Who to Call     For medical emergencies, please call 911.  For non-urgent questions about your medical care, please call your primary care provider or clinic, [phone]  For questions related to your surgery, please call your surgery clinic        Attending Provider     Provider Specialty    Quique Olivarez MD Surgery       Primary Care Provider Office Phone # Fax #    David Terrell Jiang [phone]      After Care Instructions     Discharge Instructions       No driving or operating machinery until the day after procedure..postfiber            Discharge Instructions       Resume pre procedure diet and medications.  Your doctor recommends that you eat 25 to 30 Grams of fiber daily. The following are some examples of fiber amounts in different foods.    Fruits: Apple (with skin) 1 medium = 4.4 Grams   Banana      1 medium = 3.1 Grams   Oranges     1 orange = 3.1 Grams   Prunes     1 cup, pitted = 12.4 Grams    Juices: Apple, unsweetened w/ added ascorbic acid  1 cup = 0.5 Grams    Grapefruit, white, canned,sweetened  1 cup = 0.2 Grams    Grape, unsweetened w/ added ascorbic  acid  1 cup = 0.5 Grams    Orange     1 cup = 0.7 Grams    Vegetables:   Cooked: Green Beans   1 cup = 4.0 Grams       Carrots   1/2 cup sliced = 2.3 Grams       Peas       1 cup = 8.8 Grams       Potato (baked, with skin)  1 medium = 3.8 Grams    Raw: Cucumber (with peel)  1 cucumber = 1.5 Grams            Lettuce     1 cup shredded = 0.5 Grams            Tomato   1 medium tomato = 1.5 Grams            Spinach  1 cup = 0.7 Grams    Legumes: Baked beans, canned, no salt added  1 cup = 13.9 Grams         Kidney Beans, canned  1 cup = 13.6 Grams         Bridges Beans, canned     1 cup = 11.6 Grams         Lentils, boiled   1 cup = 15.6 Grams    Breads, Pastas, Flours: Bran muffins   1 medium muffin = 5.2 Grams           Oatmeal, cooked  1 cup = 4.0 Grams           White Bread   1 slice = 0.6 Grams           Whole- wheat bread = 1.9 Grams    Pasta and rice, cooked: Macaroni  1 cup = 2.5 Grams           Rice, Brown  1 cup = 3.5 Grams           Rice, white   1 cup = 0.6 Grams           Spaghetti (regular) 1 cup = 2.5 Grams    Nuts: Almonds   1 cup = 17.4 Grams            Peanuts    1 cup = 12.4 Grams            Discharge Instructions       Call [phone] for questions or concerns. Call for increased abdominal pain, rectal bleeding, persistent vomiting or fever over 101.5 F  You will receive a letter in about one week with results.                  Your next 10 appointments already scheduled     Aug 22, 2018  8:45 AM CDT   LAB with  LAB   [facility] ([facility])    [address] [place] [id number] [phone]           Please do not eat 10-12 hours before your appointment if you are coming in fasting for labs on lipids, cholesterol, or glucose (sugar). This does not apply to pregnant women. Water, hot tea and black coffee (with nothing added) are okay. Do not drink other fluids, diet soda or chew gum.            Aug 22, 2018  9:00 AM CDT   XR CHEST 2 VIEWS with UCXR1    [facility] ([facility])    [address] [place] [id number] [phone]           Please bring a list of your current medicines to your exam. (Include vitamins, minerals and over-thecounter medicines.) Leave your valuables at home.  Tell your doctor if there is a chance you may be pregnant.  You do not need to do anything special for this exam.            Aug 22, 2018  9:30 AM CDT   PFT VISIT with  PFL ABE   [facility] Function Testing ([facility])    [address] [place] [phone]   [phone]            Aug 22, 2018 10:00 AM CDT   (Arrive by 9:45 AM)   Return Lung Transplant with Kirk Broussard MD   [facility] ([facility])    [address] [place] [id number]   [phone]            Aug 22, 2018  1:00 PM CDT   (Arrive by 12:45 PM)   Transplant Skin Check with SAMMI Luz MD   [facility] ([facility])    [address] [place] [id number] [phone]              Further instructions from your care team       [facility]   Adult Discharge Orders & Instructions     For 12 hours after surgery    1. Get plenty of rest.  A responsible adult must stay with you for at least 12 hours after you leave the hospital.   2. Do not drive or use heavy equipment.  If you have weakness or tingling, don't drive or use heavy equipment until this feeling goes away.  3. Do not drink alcohol.  4. Avoid strenuous or risky activities.  Ask for help when climbing stairs.   5. You may feel lightheaded.  IF so, sit for a few minutes before standing.  Have someone help you get up.   6. If you have nausea (feel sick to your stomach): Drink only clear liquids such as apple juice, ginger ale, broth or 7-Up.  Rest may also help.  Be sure to drink enough  "fluids.  Move to a regular diet as you feel able.  7. You may have a slight fever. Call the doctor if your fever is over 101 F (38.3 C) (taken under the tongue) or lasts longer than 24 hours.  8. You may have a dry mouth, a sore throat, muscle aches or trouble sleeping.  These should go away after 24 hours.  9. Do not make important or legal decisions.   Call your doctor for any of the followin.  Signs of infection (fever, growing tenderness at the surgery site, a large amount of drainage or bleeding, severe pain, foul-smelling drainage, redness, swelling).    2. It has been over 8 to 10 hours since surgery and you are still not able to urinate (pass water).    3.  Headache for over 24 hours.    4.  Numbness, tingling or weakness the day after surgery (if you had spinal anesthesia).  To contact a doctor, call _________[phone]_______________________    Pending Results     No orders found from 8/15/2018 to 2018.            Admission Information     Date & Time Provider Department Dept. Phone    2018 Quique Olivarez MD [facility] [phone]      Your Vitals Were     Blood Pressure Temperature Respirations Height Weight Pulse Oximetry    142/98 96.4  F (35.8  C) (Temporal) 18 1.727 m (5' 8\") 70.1 kg (154 lb 9.6 oz) 97%    BMI (Body Mass Index)                   23.51 kg/m2           [employer] Information     [employer] gives you secure access to your electronic health record. If you see a primary care provider, you can also send messages to your care team and make appointments. If you have questions, please call your primary care clinic.  If you do not have a primary care provider, please call [phone] and they will assist you.        Care EveryWhere ID     This is your Care EveryWhere ID. This could be used by other organizations to access your [facility] medical records  [id number]        Equal Access to Services     MICHAEL CANTOR AH: ulises Rivera " galejulien, chelsea aldrich, daniel potteraan ah. So [facility] [phone].    ATENCIÓN: Si alta dodson, tiene a neely disposición servicios gratuitos de asistencia lingüística. Llame al [phone].    We comply with applicable federal civil rights laws and [place] laws. We do not discriminate on the basis of race, color, national origin, age, disability, sex, sexual orientation, or gender identity.               Review of your medicines      CONTINUE these medicines which have NOT CHANGED        Dose / Directions    albuterol 108 (90 Base) MCG/ACT inhaler   Commonly known as:  PROAIR HFA/PROVENTIL HFA/VENTOLIN HFA   Used for:  Wheezing        Dose:  2 puff   Inhale 2 puffs into the lungs every 6 hours as needed for shortness of breath / dyspnea or wheezing   Quantity:  3 Inhaler   Refills:  11       azaTHIOprine 50 MG tablet   Commonly known as:  IMURAN   Used for:  Lung replaced by transplant (H)        Dose:  25 mg   Take 0.5 tablets (25 mg) by mouth daily   Quantity:  15 tablet   Refills:  11       bisacodyl 5 MG EC tablet   Commonly known as:  DULCOLAX   Used for:  Encounter for screening colonoscopy        Take as directed by colonoscopy prep instructions   Quantity:  2 tablet   Refills:  0       calcium-vitamin D 600-400 MG-UNIT per tablet   Commonly known as:  CALTRATE   Used for:  Lung transplant status, bilateral (H)        Dose:  1 tablet   Take 1 tablet by mouth 2 times daily (with meals)   Quantity:  60 tablet   Refills:  12       fluorouracil 5 % cream   Commonly known as:  EFUDEX   Used for:  Keratosis, actinic        Apply topically daily Use once per day for 10 days on areas of scalp, forehead and face that are scaled and gritty (one month on the central forehead). Avoid eyes.   Quantity:  40 g   Refills:  1       furosemide 20 MG tablet   Commonly known as:  LASIX   Used for:  Essential hypertension        Dose:  20 mg   Take 1 tablet (20 mg) by mouth daily   Quantity:   90 tablet   Refills:  4       lisinopril 5 MG tablet   Commonly known as:  PRINIVIL/ZESTRIL   Used for:  Essential hypertension        Dose:  5 mg   Take 1 tablet (5 mg) by mouth daily   Quantity:  90 tablet   Refills:  4       loperamide 2 MG capsule   Commonly known as:  IMODIUM   Used for:  Lung transplant status, bilateral (H)        Dose:  2 mg   Take 1 capsule (2 mg) by mouth 4 times daily as needed for diarrhea   Quantity:  120 capsule   Refills:  12       metoprolol tartrate 25 MG tablet   Commonly known as:  LOPRESSOR   Used for:  Essential hypertension        Dose:  25 mg   Take 1 tablet (25 mg) by mouth 2 times daily   Quantity:  180 tablet   Refills:  4       multivitamin, therapeutic Tabs tablet   Used for:  Lung transplant status, bilateral (H)        Dose:  1 tablet   Take 1 tablet by mouth daily   Quantity:  30 tablet   Refills:  12       omeprazole 20 MG CR capsule   Commonly known as:  priLOSEC   Used for:  Lung transplant status, bilateral (H)        Dose:  20 mg   Take 1 capsule (20 mg) by mouth 2 times daily (before meals)   Quantity:  180 capsule   Refills:  4       predniSONE 5 MG tablet   Commonly known as:  DELTASONE   Used for:  Lung transplant status, bilateral (H)        TAKE ONE TABLET BY MOUTH EVERY MORNING AND TAKE ONE-HALF TABLET BY MOUTH EVERY EVENING   Quantity:  45 tablet   Refills:  12       sildenafil 25 MG tablet   Commonly known as:  VIAGRA   Used for:  ED (erectile dysfunction)        Dose:  25 mg   Take 1 tablet (25 mg) by mouth as needed   Quantity:  30 tablet   Refills:  0       sulfamethoxazole-trimethoprim 400-80 MG per tablet   Commonly known as:  BACTRIM/SEPTRA   Used for:  Lung replaced by transplant (H)        TAKE ONE TABLET BY MOUTH THREE TIMES A WEEK   Quantity:  12 tablet   Refills:  11       tacrolimus 1 MG capsule   Commonly known as:  GENERIC EQUIVALENT   Used for:  Lung transplant status, bilateral (H)        Take 3 caps every am and 3 caps every pm.    Quantity:  180 capsule   Refills:  12       valGANciclovir 450 MG tablet   Commonly known as:  VALCYTE   Used for:  CMV (cytomegalovirus infection) (H), Lung replaced by transplant (H)        TAKE ONE TABLETS (450MG) BY MOUTH TWO TIMES A DAY   Quantity:  60 tablet   Refills:  11                Protect others around you: Learn how to safely use, store and throw away your medicines at www.disposemymeds.org.             Medication List: This is a list of all your medications and when to take them. Check marks below indicate your daily home schedule. Keep this list as a reference.      Medications           Morning Afternoon Evening Bedtime As Needed    albuterol 108 (90 Base) MCG/ACT inhaler   Commonly known as:  PROAIR HFA/PROVENTIL HFA/VENTOLIN HFA   Inhale 2 puffs into the lungs every 6 hours as needed for shortness of breath / dyspnea or wheezing                                azaTHIOprine 50 MG tablet   Commonly known as:  IMURAN   Take 0.5 tablets (25 mg) by mouth daily                                bisacodyl 5 MG EC tablet   Commonly known as:  DULCOLAX   Take as directed by colonoscopy prep instructions                                calcium-vitamin D 600-400 MG-UNIT per tablet   Commonly known as:  CALTRATE   Take 1 tablet by mouth 2 times daily (with meals)                                fluorouracil 5 % cream   Commonly known as:  EFUDEX   Apply topically daily Use once per day for 10 days on areas of scalp, forehead and face that are scaled and gritty (one month on the central forehead). Avoid eyes.                                furosemide 20 MG tablet   Commonly known as:  LASIX   Take 1 tablet (20 mg) by mouth daily                                lisinopril 5 MG tablet   Commonly known as:  PRINIVIL/ZESTRIL   Take 1 tablet (5 mg) by mouth daily                                loperamide 2 MG capsule   Commonly known as:  IMODIUM   Take 1 capsule (2 mg) by mouth 4 times daily as needed for diarrhea                                 metoprolol tartrate 25 MG tablet   Commonly known as:  LOPRESSOR   Take 1 tablet (25 mg) by mouth 2 times daily                                multivitamin, therapeutic Tabs tablet   Take 1 tablet by mouth daily                                omeprazole 20 MG CR capsule   Commonly known as:  priLOSEC   Take 1 capsule (20 mg) by mouth 2 times daily (before meals)                                predniSONE 5 MG tablet   Commonly known as:  DELTASONE   TAKE ONE TABLET BY MOUTH EVERY MORNING AND TAKE ONE-HALF TABLET BY MOUTH EVERY EVENING                                sildenafil 25 MG tablet   Commonly known as:  VIAGRA   Take 1 tablet (25 mg) by mouth as needed                                sulfamethoxazole-trimethoprim 400-80 MG per tablet   Commonly known as:  BACTRIM/SEPTRA   TAKE ONE TABLET BY MOUTH THREE TIMES A WEEK                                tacrolimus 1 MG capsule   Commonly known as:  GENERIC EQUIVALENT   Take 3 caps every am and 3 caps every pm.                                valGANciclovir 450 MG tablet   Commonly known as:  VALCYTE   TAKE ONE TABLETS (450MG) BY MOUTH TWO TIMES A DAY

## 2018-08-17 NOTE — DISCHARGE INSTRUCTIONS
[facility]   Adult Discharge Orders & Instructions     For 12 hours after surgery    1. Get plenty of rest.  A responsible adult must stay with you for at least 12 hours after you leave the hospital.   2. Do not drive or use heavy equipment.  If you have weakness or tingling, don't drive or use heavy equipment until this feeling goes away.  3. Do not drink alcohol.  4. Avoid strenuous or risky activities.  Ask for help when climbing stairs.   5. You may feel lightheaded.  IF so, sit for a few minutes before standing.  Have someone help you get up.   6. If you have nausea (feel sick to your stomach): Drink only clear liquids such as apple juice, ginger ale, broth or 7-Up.  Rest may also help.  Be sure to drink enough fluids.  Move to a regular diet as you feel able.  7. You may have a slight fever. Call the doctor if your fever is over 101 F (38.3 C) (taken under the tongue) or lasts longer than 24 hours.  8. You may have a dry mouth, a sore throat, muscle aches or trouble sleeping.  These should go away after 24 hours.  9. Do not make important or legal decisions.   Call your doctor for any of the followin.  Signs of infection (fever, growing tenderness at the surgery site, a large amount of drainage or bleeding, severe pain, foul-smelling drainage, redness, swelling).    2. It has been over 8 to 10 hours since surgery and you are still not able to urinate (pass water).    3.  Headache for over 24 hours.    4.  Numbness, tingling or weakness the day after surgery (if you had spinal anesthesia).  To contact a doctor, call _________[phone]_______________________

## 2018-08-17 NOTE — IP AVS SNAPSHOT
[facility]    [address]    [place] [address]    Phone:  [phone]    Fax:  [phone]                                       After Visit Summary   8/17/2018    Aubrey Duncan    MRN: [id number]           After Visit Summary Signature Page     I have received my discharge instructions, and my questions have been answered. I have discussed any challenges I see with this plan with the nurse or doctor.    ..........................................................................................................................................  Patient/Patient Representative Signature      ..........................................................................................................................................  Patient Representative Print Name and Relationship to Patient    ..................................................               ................................................  Date                                            Time    ..........................................................................................................................................  Reviewed by Signature/Title    ...................................................              ..............................................  Date                                                            Time
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
## 2018-08-22 ENCOUNTER — OFFICE VISIT (OUTPATIENT)
Dept: DERMATOLOGY | Facility: CLINIC | Age: 63
End: 2018-08-22
Payer: MEDICARE

## 2018-08-22 ENCOUNTER — RADIANT APPOINTMENT (OUTPATIENT)
Dept: GENERAL RADIOLOGY | Facility: CLINIC | Age: 63
End: 2018-08-22
Attending: INTERNAL MEDICINE
Payer: MEDICARE

## 2018-08-22 ENCOUNTER — OFFICE VISIT (OUTPATIENT)
Dept: TRANSPLANT | Facility: CLINIC | Age: 63
End: 2018-08-22
Attending: INTERNAL MEDICINE
Payer: MEDICARE

## 2018-08-22 VITALS
HEART RATE: 64 BPM | OXYGEN SATURATION: 98 % | DIASTOLIC BLOOD PRESSURE: 70 MMHG | WEIGHT: 161 LBS | TEMPERATURE: 98.1 F | BODY MASS INDEX: 24.4 KG/M2 | SYSTOLIC BLOOD PRESSURE: 135 MMHG | RESPIRATION RATE: 18 BRPM | HEIGHT: 68 IN

## 2018-08-22 DIAGNOSIS — J44.9 CHRONIC OBSTRUCTIVE PULMONARY DISEASE (H): ICD-10-CM

## 2018-08-22 DIAGNOSIS — E88.01 ALPHA-1-ANTITRYPSIN DEFICIENCY (H): Primary | Chronic | ICD-10-CM

## 2018-08-22 DIAGNOSIS — Z79.899 ENCOUNTER FOR LONG-TERM CURRENT USE OF MEDICATION: ICD-10-CM

## 2018-08-22 DIAGNOSIS — Z94.2 LUNG REPLACED BY TRANSPLANT (H): ICD-10-CM

## 2018-08-22 DIAGNOSIS — Z94.89 TRANSPLANT RECIPIENT: ICD-10-CM

## 2018-08-22 DIAGNOSIS — Z94.2 S/P LUNG TRANSPLANT (H): Chronic | ICD-10-CM

## 2018-08-22 DIAGNOSIS — Z94.2 S/P LUNG TRANSPLANT (H): ICD-10-CM

## 2018-08-22 DIAGNOSIS — L82.1 SEBORRHEIC KERATOSES: ICD-10-CM

## 2018-08-22 DIAGNOSIS — Z12.83 SCREENING FOR SKIN CANCER: ICD-10-CM

## 2018-08-22 DIAGNOSIS — D22.9 MULTIPLE BENIGN NEVI: Primary | ICD-10-CM

## 2018-08-22 DIAGNOSIS — Z23 ENCOUNTER FOR VACCINATION: Primary | ICD-10-CM

## 2018-08-22 DIAGNOSIS — E11.9 DIABETES MELLITUS TYPE 2, DIET-CONTROLLED (H): ICD-10-CM

## 2018-08-22 DIAGNOSIS — I10 ESSENTIAL HYPERTENSION: ICD-10-CM

## 2018-08-22 LAB
ANION GAP SERPL CALCULATED.3IONS-SCNC: 6 MMOL/L (ref 3–14)
BUN SERPL-MCNC: 33 MG/DL (ref 7–30)
CALCIUM SERPL-MCNC: 9 MG/DL (ref 8.5–10.1)
CHLORIDE SERPL-SCNC: 106 MMOL/L (ref 94–109)
CHOLEST SERPL-MCNC: 185 MG/DL
CO2 SERPL-SCNC: 29 MMOL/L (ref 20–32)
CREAT SERPL-MCNC: 1.12 MG/DL (ref 0.66–1.25)
ERYTHROCYTE [DISTWIDTH] IN BLOOD BY AUTOMATED COUNT: 12.9 % (ref 10–15)
EXPTIME-PRE: 8.06 SEC
FEF2575-%PRED-PRE: 143 %
FEF2575-PRE: 3.69 L/SEC
FEF2575-PRED: 2.57 L/SEC
FEFMAX-%PRED-PRE: 112 %
FEFMAX-PRE: 9.39 L/SEC
FEFMAX-PRED: 8.37 L/SEC
FEV1-%PRED-PRE: 113 %
FEV1-PRE: 3.62 L
FEV1FEV6-PRE: 82 %
FEV1FEV6-PRED: 78 %
FEV1FVC-PRE: 82 %
FEV1FVC-PRED: 75 %
FIFMAX-PRE: 7.01 L/SEC
FVC-%PRED-PRE: 106 %
FVC-PRE: 4.41 L
FVC-PRED: 4.14 L
GFR SERPL CREATININE-BSD FRML MDRD: 66 ML/MIN/1.7M2
GLUCOSE SERPL-MCNC: 129 MG/DL (ref 70–99)
HBA1C MFR BLD: 7.8 % (ref 0–5.6)
HCT VFR BLD AUTO: 38.7 % (ref 40–53)
HDLC SERPL-MCNC: 39 MG/DL
HGB BLD-MCNC: 12.5 G/DL (ref 13.3–17.7)
LDLC SERPL CALC-MCNC: 80 MG/DL
MAGNESIUM SERPL-MCNC: 1.6 MG/DL (ref 1.6–2.3)
MCH RBC QN AUTO: 32.6 PG (ref 26.5–33)
MCHC RBC AUTO-ENTMCNC: 32.3 G/DL (ref 31.5–36.5)
MCV RBC AUTO: 101 FL (ref 78–100)
NONHDLC SERPL-MCNC: 146 MG/DL
PLATELET # BLD AUTO: 163 10E9/L (ref 150–450)
POTASSIUM SERPL-SCNC: 4.5 MMOL/L (ref 3.4–5.3)
RBC # BLD AUTO: 3.83 10E12/L (ref 4.4–5.9)
SODIUM SERPL-SCNC: 140 MMOL/L (ref 133–144)
TACROLIMUS BLD-MCNC: 10.7 UG/L (ref 5–15)
TME LAST DOSE: 2000 H
TRIGL SERPL-MCNC: 331 MG/DL
WBC # BLD AUTO: 3.3 10E9/L (ref 4–11)

## 2018-08-22 PROCEDURE — G0463 HOSPITAL OUTPT CLINIC VISIT: HCPCS | Mod: ZF

## 2018-08-22 PROCEDURE — 25000125 ZZHC RX 250: Mod: ZF | Performed by: INTERNAL MEDICINE

## 2018-08-22 PROCEDURE — 90750 HZV VACC RECOMBINANT IM: CPT | Mod: ZF | Performed by: INTERNAL MEDICINE

## 2018-08-22 PROCEDURE — 80197 ASSAY OF TACROLIMUS: CPT | Performed by: INTERNAL MEDICINE

## 2018-08-22 PROCEDURE — 90471 IMMUNIZATION ADMIN: CPT

## 2018-08-22 RX ADMIN — ZOSTER VACCINE RECOMBINANT, ADJUVANTED 0.5 ML: KIT at 11:15

## 2018-08-22 ASSESSMENT — PAIN SCALES - GENERAL
PAINLEVEL: NO PAIN (0)
PAINLEVEL: NO PAIN (0)

## 2018-08-22 NOTE — PROGRESS NOTES
SUBJECTIVE:  Aubrey is a pulmonary transplant patient who comes in today for a skin check.  He has had some actinic keratoses in the past.  I did prescribe 5-FU or Efudex for him, and he has been using that judiciously from time to time.  He states that he has no lesions of concern except one on his right ear, which is the site of a 5-FU application in the last few weeks.      OBJECTIVE:  A healthy gentleman in no distress.  We checked his face, neck, chest, back, arms, legs, hands, feet, buttocks.  On the right ear canal is a small, bloody crust that likely represents the site of the 5-FU use.  I see no need to remove the crust and check the area.  He did not show any actinic keratoses today.  He does have seborrheic keratoses on his back, numerous flat and papular nevi, but no darkly pigmented lesions, nothing of concern.      ASSESSMENT:  No worrisome lesions.      PLAN:  Continue the 5-FU from time to time to small scaly papular lesions..  Return again in 6 months.  Call us if he notices any new lesions of concern.  His pulmonary status apparently is excellent.      MEDICATIONS:  Reviewed.        ALLERGIES:  Reviewed.

## 2018-08-22 NOTE — LETTER
8/22/2018       RE: Aubrey Duncan  [address]  [place] [address]     Dear Colleague,    Thank you for referring your patient, Aubrey Duncan, to the [facility] D[facility] at [facility]. Please see a copy of my visit note below.    SUBJECTIVE:  Aubrey is a pulmonary transplant patient who comes in today for a skin check.  He has had some actinic keratoses in the past.  I did prescribe 5-FU or Efudex for him, and he has been using that judiciously from time to time.  He states that he has no lesions of concern except one on his right ear, which is the site of a 5-FU application in the last few weeks.      OBJECTIVE:  A healthy gentleman in no distress.  We checked his face, neck, chest, back, arms, legs, hands, feet, buttocks.  On the right ear canal is a small, bloody crust that likely represents the site of the 5-FU use.  I see no need to remove the crust and check the area.  He did not show any actinic keratoses today.  He does have seborrheic keratoses on his back, numerous flat and papular nevi, but no darkly pigmented lesions, nothing of concern.      ASSESSMENT:  No worrisome lesions.      PLAN:  Continue the 5-FU from time to time to small scaly papular lesions..  Return again in 6 months.  Call us if he notices any new lesions of concern.  His pulmonary status apparently is excellent.      MEDICATIONS:  Reviewed.        ALLERGIES:  Reviewed.           Again, thank you for allowing me to participate in the care of your patient.      Sincerely,    SAMMI Luz MD

## 2018-08-22 NOTE — NURSING NOTE
Dermatology Rooming Note    Aubrey Duncan's goals for this visit include:   Chief Complaint   Patient presents with     Derm Problem     Aubrey is in for his annual skin check. He is concered about a small spot in his right ear and on the top of his scalp     Yajaira Lopez, EMT

## 2018-08-22 NOTE — PATIENT INSTRUCTIONS
1. You are getting shingles vaccine today (Shingrix). You may have some pain at the injection site--call with fever or reactions  2. Coordinator will call you with today's tacro level  3. See you in 6 months

## 2018-08-22 NOTE — MR AVS SNAPSHOT
After Visit Summary   8/22/2018    Aubrey Duncan    MRN: [id number]           Patient Information     Date Of Birth          1955        Visit Information        Provider Department      8/22/2018 1:00 PM SAMMI Luz MD [facility]         Follow-ups after your visit        Your next 10 appointments already scheduled     Aug 22, 2018  1:00 PM CDT   (Arrive by 12:45 PM)   Transplant Skin Check with SAMMI Luz MD   [facility] Dermatology ([facility])    [address] [place] [id number] [phone]            Feb 20, 2019  8:30 AM CST   Lab with  LAB   [facility] Lab ([facility])    [address] [place] [id number] [phone]            Feb 20, 2019  8:45 AM CST   XR CHEST 2 VIEWS with XR1   [facility] Xray ([facility])    [address] [place] [id number] [phone]           Please bring a list of your current medicines to your exam. (Include vitamins, minerals and over-thecounter medicines.) Leave your valuables at home.  Tell your doctor if there is a chance you may be pregnant.  You do not need to do anything special for this exam.            Feb 20, 2019  9:00 AM CST   PFT VISIT with  PFL A   [facility] Pulmonary Function Testing ([facility])    [address] [place] [id number] [phone]            Feb 20, 2019  9:20 AM CST   (Arrive by 9:05 AM)   Return Lung Transplant with Kirk Broussard MD   [facility] Solid Organ Transplant ([facility])    [address] [place] [id number] [phone]              Future tests that were ordered for you today     Open Future Orders        Priority Expected Expires Ordered    PRA Donor Specific Antibody Routine 2/22/2019 8/22/2019 8/22/2018    Basic  metabolic panel Routine 2/22/2019 8/22/2019 8/22/2018    Magnesium Routine 2/22/2019 8/22/2019 8/22/2018    CBC with platelets Routine 2/22/2019 8/22/2019 8/22/2018    CMV DNA quantification Routine 2/22/2019 8/22/2019 8/22/2018    X-ray Chest 2 vws* Routine 2/22/2019 8/22/2019 8/22/2018    Spirometry, Breathing Capacity Routine 2/22/2019 8/22/2019 8/22/2018    Tacrolimus level Routine 2/22/2019 8/22/2019 8/22/2018            Who to contact     Please call your clinic at [phone] to:    Ask questions about your health    Make or cancel appointments    Discuss your medicines    Learn about your test results    Speak to your doctor            Additional Information About Your Visit        HowGoodharBluesocket Information     Sponge gives you secure access to your electronic health record. If you see a primary care provider, you can also send messages to your care team and make appointments. If you have questions, please call your primary care clinic.  If you do not have a primary care provider, please call [phone] and they will assist you.      Sponge is an electronic gateway that provides easy, online access to your medical records. With Sponge, you can request a clinic appointment, read your test results, renew a prescription or communicate with your care team.     To access your existing account, please contact your [facility] or call [phone] for assistance.        Care EveryWhere ID     This is your Care EveryWhere ID. This could be used by other organizations to access your [place] medical records  [id number]         Blood Pressure from Last 3 Encounters:   08/22/18 135/70   08/17/18 157/80   06/14/18 128/80    Weight from Last 3 Encounters:   08/22/18 73 kg (161 lb)   08/17/18 70.1 kg (154 lb 9.6 oz)   06/14/18 74.2 kg (163 lb 8 oz)              Today, you had the following     No orders found for display       Primary Care Provider Office Phone # Fax #    David Tejada  MD Apolinar [phone]       [address] [facility] [address]        Equal Access to Services     MICHAEL CANTOR : Hadii aad ku hadabigailniurka Dalton, rosadavis beasleyjabariha, codyjanet thomasmadavis acevedoyusufdavis, waxjeff pollyin hayaajens acevedotayla cotter laedgardjens machado. So [facility] [phone].    ATENCIÓN: Si habla español, tiene a neely disposición servicios gratuitos de asistencia lingüística. Llame al [phone].    We comply with applicable federal civil rights laws and [place] laws. We do not discriminate on the basis of race, color, national origin, age, disability, sex, sexual orientation, or gender identity.            Thank you!     Thank you for choosing [facility] DERMATOLOGY  for your care. Our goal is always to provide you with excellent care. Hearing back from our patients is one way we can continue to improve our services. Please take a few minutes to complete the written survey that you may receive in the mail after your visit with us. Thank you!             Your Updated Medication List - Protect others around you: Learn how to safely use, store and throw away your medicines at www.disposemymeds.org.          This list is accurate as of 8/22/18 12:56 PM.  Always use your most recent med list.                   Brand Name Dispense Instructions for use Diagnosis    albuterol 108 (90 Base) MCG/ACT inhaler    PROAIR HFA/PROVENTIL HFA/VENTOLIN HFA    3 Inhaler    Inhale 2 puffs into the lungs every 6 hours as needed for shortness of breath / dyspnea or wheezing    Wheezing       azaTHIOprine 50 MG tablet    IMURAN    15 tablet    Take 0.5 tablets (25 mg) by mouth daily    Lung replaced by transplant (H)       bisacodyl 5 MG EC tablet    DULCOLAX    2 tablet    Take as directed by colonoscopy prep instructions    Encounter for screening colonoscopy       calcium-vitamin D 600-400 MG-UNIT per tablet    CALTRATE    60 tablet    Take 1 tablet by mouth 2 times daily (with meals)    Lung transplant status, bilateral (H)        fluorouracil 5 % cream    EFUDEX    40 g    Apply topically daily Use once per day for 10 days on areas of scalp, forehead and face that are scaled and gritty (one month on the central forehead). Avoid eyes.    Keratosis, actinic       furosemide 20 MG tablet    LASIX    90 tablet    Take 1 tablet (20 mg) by mouth daily    Essential hypertension       lisinopril 5 MG tablet    PRINIVIL/ZESTRIL    90 tablet    Take 1 tablet (5 mg) by mouth daily    Essential hypertension       loperamide 2 MG capsule    IMODIUM    120 capsule    Take 1 capsule (2 mg) by mouth 4 times daily as needed for diarrhea    Lung transplant status, bilateral (H)       metoprolol tartrate 25 MG tablet    LOPRESSOR    180 tablet    Take 1 tablet (25 mg) by mouth 2 times daily    Essential hypertension       multivitamin, therapeutic Tabs tablet     30 tablet    Take 1 tablet by mouth daily    Lung transplant status, bilateral (H)       omeprazole 20 MG CR capsule    priLOSEC    180 capsule    Take 1 capsule (20 mg) by mouth 2 times daily (before meals)    Lung transplant status, bilateral (H)       predniSONE 5 MG tablet    DELTASONE    45 tablet    TAKE ONE TABLET BY MOUTH EVERY MORNING AND TAKE ONE-HALF TABLET BY MOUTH EVERY EVENING    Lung transplant status, bilateral (H)       sildenafil 25 MG tablet    VIAGRA    30 tablet    Take 1 tablet (25 mg) by mouth as needed    ED (erectile dysfunction)       sulfamethoxazole-trimethoprim 400-80 MG per tablet    BACTRIM/SEPTRA    12 tablet    TAKE ONE TABLET BY MOUTH THREE TIMES A WEEK    Lung replaced by transplant (H)       tacrolimus 1 MG capsule    GENERIC EQUIVALENT    180 capsule    Take 3 caps every am and 3 caps every pm.    Lung transplant status, bilateral (H)       valGANciclovir 450 MG tablet    VALCYTE    60 tablet    TAKE ONE TABLETS (450MG) BY MOUTH TWO TIMES A DAY    CMV (cytomegalovirus infection) (H), Lung replaced by transplant (H)

## 2018-08-22 NOTE — MR AVS SNAPSHOT
After Visit Summary   8/22/2018    Aubrey Duncan    MRN: [id number]           Patient Information     Date Of Birth          1955        Visit Information        Provider Department      8/22/2018 10:00 AM Kirk Broussard MD [facility] Solid Organ Transplant        Today's Diagnoses     Encounter for vaccination    -  1    Lung replaced by transplant (H)          Care Instructions    1. You are getting shingles vaccine today (Shingrix). You may have some pain at the injection site--call with fever or reactions  2. Coordinator will call you with today's tacro level  3. See you in 6 months          Follow-ups after your visit        Your next 10 appointments already scheduled     Aug 22, 2018  1:00 PM CDT   (Arrive by 12:45 PM)   Transplant Skin Check with SAMMI Luz MD   [facility] Dermatology ([facility])    [address] [place] [address] [phone] [phone]            Feb 20, 2019  8:30 AM CST   Lab with  LAB   [facility] Lab ([facility])    [address] [place] [id number] [phone]            Feb 20, 2019  8:45 AM CST   XR CHEST 2 VIEWS with UCXR1   [facility] Center Xray ([facility])    [address] [place] [address] [id number] [phone]           Please bring a list of your current medicines to your exam. (Include vitamins, minerals and over-thecounter medicines.) Leave your valuables at home.  Tell your doctor if there is a chance you may be pregnant.  You do not need to do anything special for this exam.            Feb 20, 2019  9:00 AM CST   PFT VISIT with  PFL A   [facility] Pulmonary Function Testing ([facility])    [address] [place] [address] [phone] [phone]            Feb 20, 2019  9:20 AM CST   (Arrive by 9:05 AM)   Return Lung Transplant with Kirk Broussard  MD   [facility] Solid Organ Transplant ([facility])    [address] [facility]  3rd Floor  [facility] [id number] [phone]              Future tests that were ordered for you today     Open Future Orders        Priority Expected Expires Ordered    PRA Donor Specific Antibody Routine 2/22/2019 8/22/2019 8/22/2018    Basic metabolic panel Routine 2/22/2019 8/22/2019 8/22/2018    Magnesium Routine 2/22/2019 8/22/2019 8/22/2018    CBC with platelets Routine 2/22/2019 8/22/2019 8/22/2018    CMV DNA quantification Routine 2/22/2019 8/22/2019 8/22/2018    X-ray Chest 2 vws* Routine 2/22/2019 8/22/2019 8/22/2018    Spirometry, Breathing Capacity Routine 2/22/2019 8/22/2019 8/22/2018    Tacrolimus level Routine 2/22/2019 8/22/2019 8/22/2018            Who to contact     If you have questions or need follow up information about today's clinic visit or your schedule please contact [facility] directly at [phone].  Normal or non-critical lab and imaging results will be communicated to you by Transaction Wirelesshart, letter or phone within 4 business days after the clinic has received the results. If you do not hear from us within 7 days, please contact the clinic through Tilkeet or phone. If you have a critical or abnormal lab result, we will notify you by phone as soon as possible.  Submit refill requests through [email or web address] or call your pharmacy and they will forward the refill request to us. Please allow 3 business days for your refill to be completed.          Additional Information About Your Visit        Transaction Wirelesshart Information     [email or web address] gives you secure access to your electronic health record. If you see a primary care provider, you can also send messages to your care team and make appointments. If you have questions, please call your primary care clinic.  If you do not have a primary care provider, please call [phone] and they will assist you.        Care EveryWhere ID     This is  "your Care EveryWhere ID. This could be used by other organizations to access your [place] medical records  [id number]        Your Vitals Were     Pulse Temperature Respirations Height Pulse Oximetry BMI (Body Mass Index)    64 98.1  F (36.7  C) (Oral) 18 1.727 m (5' 8\") 98% 24.48 kg/m2       Blood Pressure from Last 3 Encounters:   08/22/18 135/70   08/17/18 157/80   06/14/18 128/80    Weight from Last 3 Encounters:   08/22/18 73 kg (161 lb)   08/17/18 70.1 kg (154 lb 9.6 oz)   06/14/18 74.2 kg (163 lb 8 oz)               Primary Care Provider Office Phone # Fax #    David Terrell Jiang [phone] [address] [facility] [address]        Equal Access to Services     [facility]: Hadii mario anderson hadabigailo Soendy, waaxda luqadaha, qaybta kaalmada adetaylayada, daniel cedillo . So [facility] [phone].    ATENCIÓN: Si habla español, tiene a neely disposición servicios gratuitos de asistencia lingüística. Alhaji al [phone].    We comply with applicable federal civil rights laws and [place] laws. We do not discriminate on the basis of race, color, national origin, age, disability, sex, sexual orientation, or gender identity.            Thank you!     Thank you for choosing [facility]  for your care. Our goal is always to provide you with excellent care. Hearing back from our patients is one way we can continue to improve our services. Please take a few minutes to complete the written survey that you may receive in the mail after your visit with us. Thank you!             Your Updated Medication List - Protect others around you: Learn how to safely use, store and throw away your medicines at www.disposemymeds.org.          This list is accurate as of 8/22/18 11:19 AM.  Always use your most recent med list.                   Brand Name Dispense Instructions for use Diagnosis    albuterol 108 (90 Base) MCG/ACT inhaler    PROAIR HFA/PROVENTIL " HFA/VENTOLIN HFA    3 Inhaler    Inhale 2 puffs into the lungs every 6 hours as needed for shortness of breath / dyspnea or wheezing    Wheezing       azaTHIOprine 50 MG tablet    IMURAN    15 tablet    Take 0.5 tablets (25 mg) by mouth daily    Lung replaced by transplant (H)       bisacodyl 5 MG EC tablet    DULCOLAX    2 tablet    Take as directed by colonoscopy prep instructions    Encounter for screening colonoscopy       calcium-vitamin D 600-400 MG-UNIT per tablet    CALTRATE    60 tablet    Take 1 tablet by mouth 2 times daily (with meals)    Lung transplant status, bilateral (H)       fluorouracil 5 % cream    EFUDEX    40 g    Apply topically daily Use once per day for 10 days on areas of scalp, forehead and face that are scaled and gritty (one month on the central forehead). Avoid eyes.    Keratosis, actinic       furosemide 20 MG tablet    LASIX    90 tablet    Take 1 tablet (20 mg) by mouth daily    Essential hypertension       lisinopril 5 MG tablet    PRINIVIL/ZESTRIL    90 tablet    Take 1 tablet (5 mg) by mouth daily    Essential hypertension       loperamide 2 MG capsule    IMODIUM    120 capsule    Take 1 capsule (2 mg) by mouth 4 times daily as needed for diarrhea    Lung transplant status, bilateral (H)       metoprolol tartrate 25 MG tablet    LOPRESSOR    180 tablet    Take 1 tablet (25 mg) by mouth 2 times daily    Essential hypertension       multivitamin, therapeutic Tabs tablet     30 tablet    Take 1 tablet by mouth daily    Lung transplant status, bilateral (H)       omeprazole 20 MG CR capsule    priLOSEC    180 capsule    Take 1 capsule (20 mg) by mouth 2 times daily (before meals)    Lung transplant status, bilateral (H)       predniSONE 5 MG tablet    DELTASONE    45 tablet    TAKE ONE TABLET BY MOUTH EVERY MORNING AND TAKE ONE-HALF TABLET BY MOUTH EVERY EVENING    Lung transplant status, bilateral (H)       sildenafil 25 MG tablet    VIAGRA    30 tablet    Take 1 tablet (25 mg) by  mouth as needed    ED (erectile dysfunction)       sulfamethoxazole-trimethoprim 400-80 MG per tablet    BACTRIM/SEPTRA    12 tablet    TAKE ONE TABLET BY MOUTH THREE TIMES A WEEK    Lung replaced by transplant (H)       tacrolimus 1 MG capsule    GENERIC EQUIVALENT    180 capsule    Take 3 caps every am and 3 caps every pm.    Lung transplant status, bilateral (H)       valGANciclovir 450 MG tablet    VALCYTE    60 tablet    TAKE ONE TABLETS (450MG) BY MOUTH TWO TIMES A DAY    CMV (cytomegalovirus infection) (H), Lung replaced by transplant (H)

## 2018-08-26 ENCOUNTER — HEALTH MAINTENANCE LETTER (OUTPATIENT)
Age: 63
End: 2018-08-26

## 2018-09-07 ENCOUNTER — TELEPHONE (OUTPATIENT)
Dept: PHARMACY | Facility: CLINIC | Age: 63
End: 2018-09-07

## 2018-09-11 DIAGNOSIS — Z94.2 LUNG REPLACED BY TRANSPLANT (H): ICD-10-CM

## 2018-09-11 DIAGNOSIS — Z94.2 LUNG TRANSPLANT STATUS, BILATERAL (H): ICD-10-CM

## 2018-09-11 RX ORDER — SULFAMETHOXAZOLE AND TRIMETHOPRIM 400; 80 MG/1; MG/1
TABLET ORAL
Qty: 12 TABLET | Refills: 11 | Status: ON HOLD | OUTPATIENT
Start: 2018-09-11 | End: 2019-02-28

## 2018-09-11 RX ORDER — PREDNISONE 5 MG/1
TABLET ORAL
Qty: 45 TABLET | Refills: 12 | Status: SHIPPED | OUTPATIENT
Start: 2018-09-11 | End: 2019-09-20

## 2018-12-04 DIAGNOSIS — Z94.2 LUNG REPLACED BY TRANSPLANT (H): ICD-10-CM

## 2018-12-04 RX ORDER — AZATHIOPRINE 50 MG/1
25 TABLET ORAL DAILY
Qty: 15 TABLET | Refills: 11 | Status: SHIPPED | OUTPATIENT
Start: 2018-12-04 | End: 2019-12-10

## 2019-01-21 ENCOUNTER — DOCUMENTATION ONLY (OUTPATIENT)
Dept: OTHER | Facility: CLINIC | Age: 64
End: 2019-01-21

## 2019-02-19 ENCOUNTER — OFFICE VISIT (OUTPATIENT)
Dept: FAMILY MEDICINE | Facility: OTHER | Age: 64
End: 2019-02-19
Attending: NURSE PRACTITIONER
Payer: MEDICARE

## 2019-02-19 VITALS
SYSTOLIC BLOOD PRESSURE: 122 MMHG | HEART RATE: 66 BPM | TEMPERATURE: 97.3 F | BODY MASS INDEX: 25.13 KG/M2 | HEIGHT: 67 IN | DIASTOLIC BLOOD PRESSURE: 80 MMHG | WEIGHT: 160.13 LBS | RESPIRATION RATE: 16 BRPM

## 2019-02-19 DIAGNOSIS — Z94.2 S/P LUNG TRANSPLANT (H): Chronic | ICD-10-CM

## 2019-02-19 DIAGNOSIS — J11.1 INFLUENZA-LIKE ILLNESS: Primary | ICD-10-CM

## 2019-02-19 DIAGNOSIS — E88.01 ALPHA-1-ANTITRYPSIN DEFICIENCY (H): Chronic | ICD-10-CM

## 2019-02-19 DIAGNOSIS — J44.9 CHRONIC OBSTRUCTIVE PULMONARY DISEASE, UNSPECIFIED COPD TYPE (H): ICD-10-CM

## 2019-02-19 PROCEDURE — G0463 HOSPITAL OUTPT CLINIC VISIT: HCPCS

## 2019-02-19 PROCEDURE — 99214 OFFICE O/P EST MOD 30 MIN: CPT | Performed by: NURSE PRACTITIONER

## 2019-02-19 RX ORDER — OSELTAMIVIR PHOSPHATE 75 MG/1
75 CAPSULE ORAL 2 TIMES DAILY
Qty: 10 CAPSULE | Refills: 0 | Status: ON HOLD | OUTPATIENT
Start: 2019-02-19 | End: 2019-02-28

## 2019-02-19 ASSESSMENT — PATIENT HEALTH QUESTIONNAIRE - PHQ9: SUM OF ALL RESPONSES TO PHQ QUESTIONS 1-9: 0

## 2019-02-19 ASSESSMENT — MIFFLIN-ST. JEOR: SCORE: 1469.95

## 2019-02-19 ASSESSMENT — PAIN SCALES - GENERAL: PAINLEVEL: NO PAIN (0)

## 2019-02-19 NOTE — NURSING NOTE
Patient presents to clinic today for general illness starting yesterday. He states his wife has been very sick and due to his double lung transplant he wanted to come in and get checked out.     No LMP for male patient.  Medication Reconciliation: complete    Jaleesa Chaparro LPN  2/19/2019 8:44 AM

## 2019-02-19 NOTE — PROGRESS NOTES
HPI:    Aubrey Duncan is a 63 year old male who presents to clinic today for influenza concerns. He wife has been very sick and he started with sx yesterday. Sx are sore throat and coughing. No fevers at this time. He has been using Vitamin C for sx thus far. He did get a flu shot this year. Reports wife was not seen for sx, has had fevers, sore throat, cough that are flu like.     Hx of smoking. Has dx of COPD. Hx of lung transplant 9/2013 due to alpha-1 antitrypsin deficiency emphysema. Continues on immune suppressive regimen as well as prophylactic bactrim and valganciclovir. Continues to follow-up with pulmonology every 6 months. 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     Past Medical History:   Diagnosis Date     Alpha-1-antitrypsin deficiency (H)      Basal cell carcinoma      CMV (cytomegalovirus infection) (H)     Reacttivation Sept 2013 when valcyte held     DVT of upper extremity (deep vein thrombosis) (H) Sept 2013    Nonocclusive thrombosis extending from the right subclavian vein to the right axillary vein,  Segmental occlusion of right basilic vein in the upper arm. 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    With DVT and thrombocytopenia     Hypertension      Lung transplant status, bilateral (H) Sept 8, 2013    Complicated by HIT and esophageal dysfunction     Squamous cell carcinoma      Steroid-induced diabetes mellitus (H)      Thrombocytopaenia     due to HIT         Current Outpatient Medications   Medication Sig Dispense Refill     albuterol (PROAIR HFA, PROVENTIL HFA, VENTOLIN HFA) 108 (90 BASE) MCG/ACT inhaler Inhale 2 puffs into the lungs every 6 hours as needed for shortness of breath / dyspnea or wheezing 3 Inhaler 11     azaTHIOprine (IMURAN) 50 MG tablet Take 0.5 tablets (25 mg) by mouth daily 15 tablet 11      bisacodyl (DULCOLAX) 5 MG EC tablet Take as directed by colonoscopy prep instructions 2 tablet 0     calcium-vitamin D (CALTRATE) 600-400 MG-UNIT per tablet Take 1 tablet by mouth 2 times daily (with meals) 60 tablet 12     fluorouracil (EFUDEX) 5 % cream Apply topically daily Use once per day for 10 days on areas of scalp, forehead and face that are scaled and gritty (one month on the central forehead). Avoid eyes. 40 g 1     furosemide (LASIX) 20 MG tablet Take 1 tablet (20 mg) by mouth daily 90 tablet 4     lisinopril (PRINIVIL/ZESTRIL) 5 MG tablet Take 1 tablet (5 mg) by mouth daily 90 tablet 4     loperamide (IMODIUM) 2 MG capsule Take 1 capsule (2 mg) by mouth 4 times daily as needed for diarrhea 120 capsule 12     metoprolol tartrate (LOPRESSOR) 25 MG tablet Take 1 tablet (25 mg) by mouth 2 times daily 180 tablet 4     multivitamin, therapeutic (THERA-VIT) TABS Take 1 tablet by mouth daily 30 tablet 12     omeprazole (PRILOSEC) 20 MG CR capsule Take 1 capsule (20 mg) by mouth 2 times daily (before meals) 180 capsule 4     oseltamivir (TAMIFLU) 75 MG capsule Take 1 capsule (75 mg) by mouth 2 times daily 10 capsule 0     predniSONE (DELTASONE) 5 MG tablet TAKE ONE TABLET BY MOUTH EVERY MORNING AND TAKE ONE-HALF TABLET BY MOUTH EVERY EVENING 45 tablet 12     sildenafil (VIAGRA) 25 MG tablet Take 1 tablet (25 mg) by mouth as needed 30 tablet 0     sulfamethoxazole-trimethoprim (BACTRIM/SEPTRA) 400-80 MG per tablet TAKE ONE TABLET BY MOUTH THREE TIMES A WEEK 12 tablet 11     tacrolimus (GENERIC EQUIVALENT) 1 MG capsule Take 3 caps every am and 3 caps every pm. 180 capsule 12     valGANciclovir (VALCYTE) 450 MG tablet TAKE ONE TABLETS (450MG) BY MOUTH TWO TIMES A DAY 60 tablet 11       Allergies   Allergen Reactions     Heparin Cross Reactors Other (See Comments)     HIT positive and AUGUST positive      Oxycodone Other (See Comments)     Significant lethargy, confusion. Tolerates dilaudid well.      Fluocinolone  "Unknown and Other (See Comments)     Tendon problems  Tendon problems     Levaquin      Pneumococcal Vaccine Other (See Comments)     Other reaction(s): Fever  \"My arm swelled up like a balloon.\"       ROS:  Pertinent positives and negatives are noted in HPI.    EXAM:  /80 (BP Location: Right arm, Patient Position: Sitting, Cuff Size: Adult Regular)   Pulse 66   Temp 97.3  F (36.3  C) (Tympanic)   Resp 16   Ht 1.702 m (5' 7\")   Wt 72.6 kg (160 lb 2 oz)   BMI 25.08 kg/m    General appearance: well appearing male, in no acute distress  Head: normocephalic, atraumatic  Ears: TM's with cone of light, no erythema, canals clear bilaterally  Eyes: conjunctivae normal  Orophayrnx: moist mucous membranes, tonsils without erythema, exudates or petechiae, no post nasal drip seen  Neck: supple without adenopathy  Respiratory: clear to auscultation bilaterally, occasional cough, no respiratory distress  Cardiac: RRR with no murmurs  Psychological: normal affect, alert and pleasant    ASSESSMENT AND PLAN:    1. Influenza-like illness    2. S/P lung transplant (H)    3. Alpha-1-antitrypsin deficiency (H)    4. Chronic obstructive pulmonary disease, unspecified COPD type (H)      Exposure to what sounds like influenza and he is developing sx. He is immunosuppressed due to hx of lung transplant. Plan to tx empirically with tamiflu given sx and hx. Reviewed s/s that would warrant follow-up. All questions were answered and he is in agreement with plan.         Hoa Olivarez..................2/19/2019 8:31 AM  "

## 2019-02-24 ENCOUNTER — HOSPITAL ENCOUNTER (EMERGENCY)
Facility: OTHER | Age: 64
Discharge: ANOTHER HOSPITAL | End: 2019-02-24
Attending: FAMILY MEDICINE | Admitting: FAMILY MEDICINE
Payer: MEDICARE

## 2019-02-24 ENCOUNTER — APPOINTMENT (OUTPATIENT)
Dept: GENERAL RADIOLOGY | Facility: OTHER | Age: 64
End: 2019-02-24
Attending: FAMILY MEDICINE
Payer: MEDICARE

## 2019-02-24 ENCOUNTER — HOSPITAL ENCOUNTER (INPATIENT)
Facility: CLINIC | Age: 64
LOS: 4 days | Discharge: HOME | DRG: 871 | End: 2019-02-28
Attending: STUDENT IN AN ORGANIZED HEALTH CARE EDUCATION/TRAINING PROGRAM | Admitting: INTERNAL MEDICINE
Payer: MEDICARE

## 2019-02-24 VITALS
HEIGHT: 67 IN | RESPIRATION RATE: 28 BRPM | OXYGEN SATURATION: 95 % | DIASTOLIC BLOOD PRESSURE: 86 MMHG | BODY MASS INDEX: 25.11 KG/M2 | TEMPERATURE: 99.4 F | WEIGHT: 160 LBS | SYSTOLIC BLOOD PRESSURE: 113 MMHG | HEART RATE: 79 BPM

## 2019-02-24 DIAGNOSIS — J98.4 PNEUMONITIS: ICD-10-CM

## 2019-02-24 DIAGNOSIS — T38.0X5A STEROID-INDUCED DIABETES MELLITUS (H): ICD-10-CM

## 2019-02-24 DIAGNOSIS — J11.1 INFLUENZA-LIKE ILLNESS: ICD-10-CM

## 2019-02-24 DIAGNOSIS — E09.9 STEROID-INDUCED DIABETES MELLITUS (H): ICD-10-CM

## 2019-02-24 DIAGNOSIS — E11.9 DIABETES MELLITUS TYPE 2, DIET-CONTROLLED (H): ICD-10-CM

## 2019-02-24 DIAGNOSIS — Z94.2 S/P LUNG TRANSPLANT (H): ICD-10-CM

## 2019-02-24 DIAGNOSIS — J15.1 PNEUMONIA OF RIGHT LOWER LOBE DUE TO PSEUDOMONAS SPECIES (H): Primary | ICD-10-CM

## 2019-02-24 DIAGNOSIS — Z94.2 S/P LUNG TRANSPLANT (H): Chronic | ICD-10-CM

## 2019-02-24 PROBLEM — A41.9 SEPSIS ASSOCIATED HYPOTENSION (H): Status: ACTIVE | Noted: 2019-02-24

## 2019-02-24 PROBLEM — I95.9 SEPSIS ASSOCIATED HYPOTENSION (H): Status: ACTIVE | Noted: 2019-02-24

## 2019-02-24 LAB
ALBUMIN SERPL-MCNC: 3.3 G/DL (ref 3.5–5.7)
ALBUMIN UR-MCNC: NEGATIVE MG/DL
ALP SERPL-CCNC: 579 U/L (ref 34–104)
ALT SERPL W P-5'-P-CCNC: 26 U/L (ref 7–52)
ANION GAP SERPL CALCULATED.3IONS-SCNC: 10 MMOL/L (ref 3–14)
APPEARANCE UR: CLEAR
AST SERPL W P-5'-P-CCNC: 26 U/L (ref 13–39)
BILIRUB SERPL-MCNC: 0.6 MG/DL (ref 0.3–1)
BILIRUB UR QL STRIP: NEGATIVE
BUN SERPL-MCNC: 29 MG/DL (ref 7–25)
CALCIUM SERPL-MCNC: 8.7 MG/DL (ref 8.6–10.3)
CHLORIDE SERPL-SCNC: 97 MMOL/L (ref 98–107)
CO2 SERPL-SCNC: 23 MMOL/L (ref 21–31)
COLOR UR AUTO: YELLOW
CREAT SERPL-MCNC: 1.42 MG/DL (ref 0.7–1.3)
CRP SERPL-MCNC: 7.9 MG/L
DIFFERENTIAL METHOD BLD: ABNORMAL
ERYTHROCYTE [DISTWIDTH] IN BLOOD BY AUTOMATED COUNT: 12.5 % (ref 10–15)
FLUAV+FLUBV RNA SPEC QL NAA+PROBE: NEGATIVE
FLUAV+FLUBV RNA SPEC QL NAA+PROBE: POSITIVE
GFR SERPL CREATININE-BSD FRML MDRD: 50 ML/MIN/{1.73_M2}
GLUCOSE BLDC GLUCOMTR-MCNC: 113 MG/DL (ref 70–99)
GLUCOSE BLDC GLUCOMTR-MCNC: 283 MG/DL (ref 70–99)
GLUCOSE SERPL-MCNC: 381 MG/DL (ref 70–105)
GLUCOSE UR STRIP-MCNC: >1000 MG/DL
HBA1C MFR BLD: 12.4 % (ref 4–6)
HCT VFR BLD AUTO: 34.6 % (ref 40–53)
HGB BLD-MCNC: 11.5 G/DL (ref 13.3–17.7)
HGB UR QL STRIP: NEGATIVE
KETONES UR STRIP-MCNC: 15 MG/DL
LACTATE SERPL-SCNC: 1.7 MMOL/L (ref 0.5–2.2)
LEUKOCYTE ESTERASE UR QL STRIP: NEGATIVE
LYMPHOCYTES # BLD AUTO: 0.8 10E9/L (ref 0.8–5.3)
LYMPHOCYTES NFR BLD AUTO: 47 %
MCH RBC QN AUTO: 32.6 PG (ref 26.5–33)
MCHC RBC AUTO-ENTMCNC: 33.2 G/DL (ref 31.5–36.5)
MCV RBC AUTO: 98 FL (ref 78–100)
METAMYELOCYTES # BLD: 0.1 10E9/L
METAMYELOCYTES NFR BLD MANUAL: 5 %
MONOCYTES # BLD AUTO: 0.1 10E9/L (ref 0–1.3)
MONOCYTES NFR BLD AUTO: 8 %
MYELOCYTES # BLD: 0.1 10E9/L
MYELOCYTES NFR BLD MANUAL: 4 %
NEUTROPHILS # BLD AUTO: 0.6 10E9/L (ref 1.6–8.3)
NEUTROPHILS NFR BLD AUTO: 36 %
NITRATE UR QL: NEGATIVE
NT-PROBNP SERPL-MCNC: 212 PG/ML (ref 0–100)
PH UR STRIP: 5 PH (ref 5–9)
PLATELET # BLD AUTO: 128 10E9/L (ref 150–450)
POTASSIUM SERPL-SCNC: 4.9 MMOL/L (ref 3.5–5.1)
PROCALCITONIN SERPL-MCNC: 1.4 NG/ML
PROT SERPL-MCNC: 6.2 G/DL (ref 6.4–8.9)
RBC # BLD AUTO: 3.53 10E12/L (ref 4.4–5.9)
RBC #/AREA URNS AUTO: NORMAL /HPF
RSV RNA SPEC NAA+PROBE: NEGATIVE
SODIUM SERPL-SCNC: 130 MMOL/L (ref 134–144)
SOURCE: ABNORMAL
SP GR UR STRIP: 1.01 (ref 1–1.03)
SPECIMEN SOURCE: ABNORMAL
TROPONIN I SERPL-MCNC: <0.03 UG/L (ref 0–0.03)
UROBILINOGEN UR STRIP-ACNC: 0.2 EU/DL (ref 0.2–1)
WBC # BLD AUTO: 1.7 10E9/L (ref 4–11)
WBC #/AREA URNS AUTO: NORMAL /HPF

## 2019-02-24 PROCEDURE — 96367 TX/PROPH/DG ADDL SEQ IV INF: CPT | Performed by: FAMILY MEDICINE

## 2019-02-24 PROCEDURE — 36415 COLL VENOUS BLD VENIPUNCTURE: CPT | Performed by: FAMILY MEDICINE

## 2019-02-24 PROCEDURE — 80053 COMPREHEN METABOLIC PANEL: CPT | Performed by: FAMILY MEDICINE

## 2019-02-24 PROCEDURE — 99223 1ST HOSP IP/OBS HIGH 75: CPT | Mod: GC | Performed by: INTERNAL MEDICINE

## 2019-02-24 PROCEDURE — 83605 ASSAY OF LACTIC ACID: CPT | Performed by: FAMILY MEDICINE

## 2019-02-24 PROCEDURE — 87205 SMEAR GRAM STAIN: CPT | Performed by: FAMILY MEDICINE

## 2019-02-24 PROCEDURE — 25800030 ZZH RX IP 258 OP 636: Performed by: EMERGENCY MEDICINE

## 2019-02-24 PROCEDURE — 86140 C-REACTIVE PROTEIN: CPT | Performed by: FAMILY MEDICINE

## 2019-02-24 PROCEDURE — 87040 BLOOD CULTURE FOR BACTERIA: CPT | Performed by: FAMILY MEDICINE

## 2019-02-24 PROCEDURE — 87631 RESP VIRUS 3-5 TARGETS: CPT | Performed by: FAMILY MEDICINE

## 2019-02-24 PROCEDURE — 25000128 H RX IP 250 OP 636: Performed by: FAMILY MEDICINE

## 2019-02-24 PROCEDURE — 40000275 ZZH STATISTIC RCP TIME EA 10 MIN

## 2019-02-24 PROCEDURE — 83880 ASSAY OF NATRIURETIC PEPTIDE: CPT | Performed by: FAMILY MEDICINE

## 2019-02-24 PROCEDURE — 25000131 ZZH RX MED GY IP 250 OP 636 PS 637: Mod: GY | Performed by: FAMILY MEDICINE

## 2019-02-24 PROCEDURE — 81001 URINALYSIS AUTO W/SCOPE: CPT | Performed by: FAMILY MEDICINE

## 2019-02-24 PROCEDURE — 25000125 ZZHC RX 250

## 2019-02-24 PROCEDURE — 25000128 H RX IP 250 OP 636

## 2019-02-24 PROCEDURE — 84484 ASSAY OF TROPONIN QUANT: CPT | Performed by: FAMILY MEDICINE

## 2019-02-24 PROCEDURE — 87077 CULTURE AEROBIC IDENTIFY: CPT | Mod: 91 | Performed by: FAMILY MEDICINE

## 2019-02-24 PROCEDURE — 25000132 ZZH RX MED GY IP 250 OP 250 PS 637: Mod: GY | Performed by: FAMILY MEDICINE

## 2019-02-24 PROCEDURE — 94640 AIRWAY INHALATION TREATMENT: CPT

## 2019-02-24 PROCEDURE — 12000001 ZZH R&B MED SURG/OB UMMC

## 2019-02-24 PROCEDURE — 83036 HEMOGLOBIN GLYCOSYLATED A1C: CPT | Performed by: EMERGENCY MEDICINE

## 2019-02-24 PROCEDURE — 85025 COMPLETE CBC W/AUTO DIFF WBC: CPT | Performed by: FAMILY MEDICINE

## 2019-02-24 PROCEDURE — 71045 X-RAY EXAM CHEST 1 VIEW: CPT

## 2019-02-24 PROCEDURE — 25800030 ZZH RX IP 258 OP 636

## 2019-02-24 PROCEDURE — 96372 THER/PROPH/DIAG INJ SC/IM: CPT | Performed by: FAMILY MEDICINE

## 2019-02-24 PROCEDURE — 96366 THER/PROPH/DIAG IV INF ADDON: CPT | Performed by: FAMILY MEDICINE

## 2019-02-24 PROCEDURE — A9270 NON-COVERED ITEM OR SERVICE: HCPCS | Mod: GY | Performed by: FAMILY MEDICINE

## 2019-02-24 PROCEDURE — 87077 CULTURE AEROBIC IDENTIFY: CPT | Performed by: FAMILY MEDICINE

## 2019-02-24 PROCEDURE — 87070 CULTURE OTHR SPECIMN AEROBIC: CPT | Performed by: FAMILY MEDICINE

## 2019-02-24 PROCEDURE — 25800030 ZZH RX IP 258 OP 636: Performed by: FAMILY MEDICINE

## 2019-02-24 PROCEDURE — 99285 EMERGENCY DEPT VISIT HI MDM: CPT | Mod: Z6 | Performed by: FAMILY MEDICINE

## 2019-02-24 PROCEDURE — 96365 THER/PROPH/DIAG IV INF INIT: CPT | Performed by: FAMILY MEDICINE

## 2019-02-24 PROCEDURE — 25000131 ZZH RX MED GY IP 250 OP 636 PS 637: Mod: GY | Performed by: EMERGENCY MEDICINE

## 2019-02-24 PROCEDURE — 00000146 ZZHCL STATISTIC GLUCOSE BY METER IP

## 2019-02-24 PROCEDURE — 87633 RESP VIRUS 12-25 TARGETS: CPT | Performed by: FAMILY MEDICINE

## 2019-02-24 PROCEDURE — 96375 TX/PRO/DX INJ NEW DRUG ADDON: CPT | Performed by: FAMILY MEDICINE

## 2019-02-24 PROCEDURE — 99285 EMERGENCY DEPT VISIT HI MDM: CPT | Mod: 25 | Performed by: FAMILY MEDICINE

## 2019-02-24 PROCEDURE — 25000125 ZZHC RX 250: Performed by: FAMILY MEDICINE

## 2019-02-24 PROCEDURE — 84145 PROCALCITONIN (PCT): CPT | Performed by: FAMILY MEDICINE

## 2019-02-24 PROCEDURE — 87040 BLOOD CULTURE FOR BACTERIA: CPT | Mod: 91 | Performed by: FAMILY MEDICINE

## 2019-02-24 RX ORDER — DEXTROSE MONOHYDRATE 25 G/50ML
25-50 INJECTION, SOLUTION INTRAVENOUS
Status: DISCONTINUED | OUTPATIENT
Start: 2019-02-24 | End: 2019-02-24 | Stop reason: HOSPADM

## 2019-02-24 RX ORDER — NICOTINE POLACRILEX 4 MG
15-30 LOZENGE BUCCAL
Status: DISCONTINUED | OUTPATIENT
Start: 2019-02-24 | End: 2019-02-24 | Stop reason: HOSPADM

## 2019-02-24 RX ORDER — METHYLPREDNISOLONE SODIUM SUCCINATE 40 MG/ML
40 INJECTION, POWDER, LYOPHILIZED, FOR SOLUTION INTRAMUSCULAR; INTRAVENOUS ONCE
Status: COMPLETED | OUTPATIENT
Start: 2019-02-24 | End: 2019-02-24

## 2019-02-24 RX ORDER — SULFAMETHOXAZOLE AND TRIMETHOPRIM 400; 80 MG/1; MG/1
1 TABLET ORAL
Status: DISCONTINUED | OUTPATIENT
Start: 2019-02-25 | End: 2019-02-26

## 2019-02-24 RX ORDER — SODIUM CHLORIDE 9 MG/ML
INJECTION, SOLUTION INTRAVENOUS CONTINUOUS
Status: DISCONTINUED | OUTPATIENT
Start: 2019-02-24 | End: 2019-02-26

## 2019-02-24 RX ORDER — ACETAMINOPHEN 325 MG/1
650 TABLET ORAL EVERY 4 HOURS PRN
Status: DISCONTINUED | OUTPATIENT
Start: 2019-02-24 | End: 2019-02-28 | Stop reason: HOSPADM

## 2019-02-24 RX ORDER — AZITHROMYCIN 500 MG/5ML
500 INJECTION, POWDER, LYOPHILIZED, FOR SOLUTION INTRAVENOUS ONCE
Status: COMPLETED | OUTPATIENT
Start: 2019-02-24 | End: 2019-02-24

## 2019-02-24 RX ORDER — LIDOCAINE 40 MG/G
CREAM TOPICAL
Status: DISCONTINUED | OUTPATIENT
Start: 2019-02-24 | End: 2019-02-28 | Stop reason: HOSPADM

## 2019-02-24 RX ORDER — PIPERACILLIN SODIUM, TAZOBACTAM SODIUM 4; .5 G/20ML; G/20ML
4.5 INJECTION, POWDER, LYOPHILIZED, FOR SOLUTION INTRAVENOUS EVERY 6 HOURS
Status: DISCONTINUED | OUTPATIENT
Start: 2019-02-24 | End: 2019-02-27

## 2019-02-24 RX ORDER — DEXTROSE MONOHYDRATE 25 G/50ML
25-50 INJECTION, SOLUTION INTRAVENOUS
Status: DISCONTINUED | OUTPATIENT
Start: 2019-02-24 | End: 2019-02-28 | Stop reason: HOSPADM

## 2019-02-24 RX ORDER — MYCOPHENOLIC ACID 180 MG/1
180 TABLET, DELAYED RELEASE ORAL 2 TIMES DAILY
COMMUNITY
End: 2022-02-03

## 2019-02-24 RX ORDER — TACROLIMUS 1 MG/1
3 CAPSULE ORAL
Status: DISCONTINUED | OUTPATIENT
Start: 2019-02-24 | End: 2019-02-26

## 2019-02-24 RX ORDER — IPRATROPIUM BROMIDE AND ALBUTEROL SULFATE 2.5; .5 MG/3ML; MG/3ML
3 SOLUTION RESPIRATORY (INHALATION)
Status: DISCONTINUED | OUTPATIENT
Start: 2019-02-24 | End: 2019-02-24 | Stop reason: HOSPADM

## 2019-02-24 RX ORDER — VALGANCICLOVIR 450 MG/1
450 TABLET, FILM COATED ORAL 2 TIMES DAILY
Status: DISCONTINUED | OUTPATIENT
Start: 2019-02-24 | End: 2019-02-28 | Stop reason: HOSPADM

## 2019-02-24 RX ORDER — NICOTINE POLACRILEX 4 MG
15-30 LOZENGE BUCCAL
Status: DISCONTINUED | OUTPATIENT
Start: 2019-02-24 | End: 2019-02-28 | Stop reason: HOSPADM

## 2019-02-24 RX ORDER — SODIUM CHLORIDE 9 MG/ML
1000 INJECTION, SOLUTION INTRAVENOUS CONTINUOUS
Status: DISCONTINUED | OUTPATIENT
Start: 2019-02-24 | End: 2019-02-24 | Stop reason: CLARIF

## 2019-02-24 RX ORDER — NALOXONE HYDROCHLORIDE 0.4 MG/ML
.1-.4 INJECTION, SOLUTION INTRAMUSCULAR; INTRAVENOUS; SUBCUTANEOUS
Status: DISCONTINUED | OUTPATIENT
Start: 2019-02-24 | End: 2019-02-28 | Stop reason: HOSPADM

## 2019-02-24 RX ORDER — ACETAMINOPHEN 325 MG/1
650 TABLET ORAL ONCE
Status: COMPLETED | OUTPATIENT
Start: 2019-02-24 | End: 2019-02-24

## 2019-02-24 RX ORDER — FERROUS SULFATE 325(65) MG
325 TABLET ORAL
COMMUNITY
End: 2022-08-04

## 2019-02-24 RX ORDER — ALBUTEROL SULFATE 90 UG/1
2 AEROSOL, METERED RESPIRATORY (INHALATION) EVERY 6 HOURS PRN
Status: DISCONTINUED | OUTPATIENT
Start: 2019-02-24 | End: 2019-02-28 | Stop reason: HOSPADM

## 2019-02-24 RX ORDER — FERROUS SULFATE 325(65) MG
325 TABLET ORAL
Status: DISCONTINUED | OUTPATIENT
Start: 2019-02-25 | End: 2019-02-28 | Stop reason: HOSPADM

## 2019-02-24 RX ADMIN — SODIUM CHLORIDE 5 UNITS/HR: 900 INJECTION, SOLUTION INTRAVENOUS at 17:15

## 2019-02-24 RX ADMIN — HUMAN INSULIN 3.5 UNITS/HR: 100 INJECTION, SOLUTION SUBCUTANEOUS at 23:41

## 2019-02-24 RX ADMIN — VANCOMYCIN HYDROCHLORIDE 2000 MG: 1 INJECTION, POWDER, LYOPHILIZED, FOR SOLUTION INTRAVENOUS at 11:33

## 2019-02-24 RX ADMIN — HYDROCORTISONE SODIUM SUCCINATE 50 MG: 100 INJECTION, POWDER, FOR SOLUTION INTRAMUSCULAR; INTRAVENOUS at 23:36

## 2019-02-24 RX ADMIN — AZITHROMYCIN MONOHYDRATE 500 MG: 500 INJECTION, POWDER, LYOPHILIZED, FOR SOLUTION INTRAVENOUS at 11:34

## 2019-02-24 RX ADMIN — SODIUM CHLORIDE 1000 ML: 900 INJECTION, SOLUTION INTRAVENOUS at 11:38

## 2019-02-24 RX ADMIN — IPRATROPIUM BROMIDE AND ALBUTEROL SULFATE 3 ML: .5; 3 SOLUTION RESPIRATORY (INHALATION) at 13:44

## 2019-02-24 RX ADMIN — METHYLPREDNISOLONE SODIUM SUCCINATE 40 MG: 40 INJECTION, POWDER, FOR SOLUTION INTRAMUSCULAR; INTRAVENOUS at 13:39

## 2019-02-24 RX ADMIN — SODIUM CHLORIDE: 9 INJECTION, SOLUTION INTRAVENOUS at 23:32

## 2019-02-24 RX ADMIN — TAZOBACTAM SODIUM AND PIPERACILLIN SODIUM 4.5 G: 500; 4 INJECTION, SOLUTION INTRAVENOUS at 11:02

## 2019-02-24 RX ADMIN — INSULIN HUMAN 5 UNITS: 100 INJECTION, SOLUTION PARENTERAL at 14:33

## 2019-02-24 RX ADMIN — INSULIN HUMAN 10 UNITS: 100 INJECTION, SOLUTION PARENTERAL at 15:38

## 2019-02-24 RX ADMIN — SODIUM CHLORIDE 1000 ML: 900 INJECTION, SOLUTION INTRAVENOUS at 10:45

## 2019-02-24 RX ADMIN — ACETAMINOPHEN 650 MG: 325 TABLET, FILM COATED ORAL at 11:55

## 2019-02-24 ASSESSMENT — ENCOUNTER SYMPTOMS
EYES NEGATIVE: 1
FEVER: 1
COUGH: 1
FATIGUE: 1
SHORTNESS OF BREATH: 1
CHILLS: 1

## 2019-02-24 ASSESSMENT — MIFFLIN-ST. JEOR: SCORE: 1469.39

## 2019-02-24 NOTE — ED TRIAGE NOTES
"ED Nursing Triage Note (General)   ________________________________    Aubrey THORPE Perla is a 63 year old Male that presents to triage private car  With history of  Influenza sx that were tx with tamiflu but continues with fever and cough and has hx of bilateral lung transplant reported by patient   Significant symptoms had onset 4 day(s) ago.  BP (!) 79/47   Temp 101.2  F (38.4  C) (Tympanic)   Resp 16   Ht 1.702 m (5' 7\")   Wt 72.6 kg (160 lb)   SpO2 95%   BMI 25.06 kg/m  t  Patient appears alert , in moderate distress., and cooperative behavior.    GCS Total = 15  Airway: intact  Breathing noted as Normal.  Circulation  Abnormal low blood pressure  Skin normal, warm  Action taken:  Triage to critical care immediately      PRE HOSPITAL PRIOR LIVING SITUATION Spouse  "

## 2019-02-24 NOTE — ED PROVIDER NOTES
BL Lung transplant.    Put on tamiflu last week    Febrile 102, more sick today.    Rhonchi in right lung base    Vanc zosyn and azithro    Hold azathiprine. Stress dose steroids    Sonoita     Accepted at the . Waiting for isolation bed    Patient was started on an insulin drip for persistently elevated BG in the setting of sepsis.  Remains stable. Electrolytes are reassuring.      Ivette Gavin MD  02/24/19 9521
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
## 2019-02-24 NOTE — ED NOTES
Glucose Values Latest Ref Rng & Units 2/24/2019   Bedside Glucose (mg/dl )  - 415   GLUCOSE 70 - 105 mg/dL 381(H)   Some recent data might be hidden

## 2019-02-25 LAB
ALBUMIN SERPL-MCNC: 2.4 G/DL (ref 3.4–5)
ALP SERPL-CCNC: 702 U/L (ref 40–150)
ALT SERPL W P-5'-P-CCNC: 27 U/L (ref 0–70)
ANION GAP SERPL CALCULATED.3IONS-SCNC: 8 MMOL/L (ref 3–14)
AST SERPL W P-5'-P-CCNC: 26 U/L (ref 0–45)
BACTERIA SPEC CULT: ABNORMAL
BASOPHILS # BLD AUTO: 0 10E9/L (ref 0–0.2)
BASOPHILS NFR BLD AUTO: 0 %
BILIRUB SERPL-MCNC: 0.4 MG/DL (ref 0.2–1.3)
BUN SERPL-MCNC: 26 MG/DL (ref 7–30)
CALCIUM SERPL-MCNC: 8.3 MG/DL (ref 8.5–10.1)
CHLORIDE SERPL-SCNC: 104 MMOL/L (ref 94–109)
CO2 SERPL-SCNC: 24 MMOL/L (ref 20–32)
CREAT SERPL-MCNC: 1.13 MG/DL (ref 0.66–1.25)
DIFFERENTIAL METHOD BLD: ABNORMAL
EOSINOPHIL # BLD AUTO: 0 10E9/L (ref 0–0.7)
EOSINOPHIL NFR BLD AUTO: 0 %
ERYTHROCYTE [DISTWIDTH] IN BLOOD BY AUTOMATED COUNT: 12.8 % (ref 10–15)
GFR SERPL CREATININE-BSD FRML MDRD: 68 ML/MIN/{1.73_M2}
GLUCOSE BLDC GLUCOMTR-MCNC: 109 MG/DL (ref 70–99)
GLUCOSE BLDC GLUCOMTR-MCNC: 125 MG/DL (ref 70–99)
GLUCOSE BLDC GLUCOMTR-MCNC: 151 MG/DL (ref 70–99)
GLUCOSE BLDC GLUCOMTR-MCNC: 152 MG/DL (ref 70–99)
GLUCOSE BLDC GLUCOMTR-MCNC: 158 MG/DL (ref 70–99)
GLUCOSE BLDC GLUCOMTR-MCNC: 163 MG/DL (ref 70–99)
GLUCOSE BLDC GLUCOMTR-MCNC: 165 MG/DL (ref 70–99)
GLUCOSE BLDC GLUCOMTR-MCNC: 179 MG/DL (ref 70–99)
GLUCOSE BLDC GLUCOMTR-MCNC: 182 MG/DL (ref 70–99)
GLUCOSE BLDC GLUCOMTR-MCNC: 189 MG/DL (ref 70–99)
GLUCOSE BLDC GLUCOMTR-MCNC: 189 MG/DL (ref 70–99)
GLUCOSE BLDC GLUCOMTR-MCNC: 203 MG/DL (ref 70–99)
GLUCOSE BLDC GLUCOMTR-MCNC: 207 MG/DL (ref 70–99)
GLUCOSE BLDC GLUCOMTR-MCNC: 221 MG/DL (ref 70–99)
GLUCOSE BLDC GLUCOMTR-MCNC: 234 MG/DL (ref 70–99)
GLUCOSE BLDC GLUCOMTR-MCNC: 260 MG/DL (ref 70–99)
GLUCOSE BLDC GLUCOMTR-MCNC: 274 MG/DL (ref 70–99)
GLUCOSE BLDC GLUCOMTR-MCNC: 274 MG/DL (ref 70–99)
GLUCOSE BLDC GLUCOMTR-MCNC: 300 MG/DL (ref 70–99)
GLUCOSE BLDC GLUCOMTR-MCNC: 324 MG/DL (ref 70–99)
GLUCOSE BLDC GLUCOMTR-MCNC: 360 MG/DL (ref 70–99)
GLUCOSE BLDC GLUCOMTR-MCNC: 77 MG/DL (ref 70–99)
GLUCOSE BLDC GLUCOMTR-MCNC: 87 MG/DL (ref 70–99)
GLUCOSE SERPL-MCNC: 154 MG/DL (ref 70–99)
GRAM STN SPEC: ABNORMAL
HCT VFR BLD AUTO: 34 % (ref 40–53)
HGB BLD-MCNC: 11 G/DL (ref 13.3–17.7)
LYMPHOCYTES # BLD AUTO: 0.3 10E9/L (ref 0.8–5.3)
LYMPHOCYTES NFR BLD AUTO: 15 %
Lab: ABNORMAL
MACROCYTES BLD QL SMEAR: PRESENT
MCH RBC QN AUTO: 33.8 PG (ref 26.5–33)
MCHC RBC AUTO-ENTMCNC: 32.4 G/DL (ref 31.5–36.5)
MCV RBC AUTO: 105 FL (ref 78–100)
MONOCYTES # BLD AUTO: 0.1 10E9/L (ref 0–1.3)
MONOCYTES NFR BLD AUTO: 6.5 %
MRSA DNA SPEC QL NAA+PROBE: NEGATIVE
NEUTROPHILS # BLD AUTO: 1.6 10E9/L (ref 1.6–8.3)
NEUTROPHILS NFR BLD AUTO: 78.5 %
PLATELET # BLD AUTO: 134 10E9/L (ref 150–450)
PLATELET # BLD EST: ABNORMAL 10*3/UL
POTASSIUM SERPL-SCNC: 5.6 MMOL/L (ref 3.4–5.3)
PROT SERPL-MCNC: 5.9 G/DL (ref 6.8–8.8)
RBC # BLD AUTO: 3.25 10E12/L (ref 4.4–5.9)
S PNEUM AG SPEC QL: NORMAL
SODIUM SERPL-SCNC: 136 MMOL/L (ref 133–144)
SPECIMEN SOURCE: ABNORMAL
SPECIMEN SOURCE: NORMAL
SPECIMEN SOURCE: NORMAL
TACROLIMUS BLD-MCNC: 15.5 UG/L (ref 5–15)
TME LAST DOSE: ABNORMAL H
WBC # BLD AUTO: 2.1 10E9/L (ref 4–11)

## 2019-02-25 PROCEDURE — 25000131 ZZH RX MED GY IP 250 OP 636 PS 637: Mod: GY

## 2019-02-25 PROCEDURE — 36415 COLL VENOUS BLD VENIPUNCTURE: CPT | Performed by: INTERNAL MEDICINE

## 2019-02-25 PROCEDURE — 87641 MR-STAPH DNA AMP PROBE: CPT

## 2019-02-25 PROCEDURE — 25000128 H RX IP 250 OP 636

## 2019-02-25 PROCEDURE — 12000001 ZZH R&B MED SURG/OB UMMC

## 2019-02-25 PROCEDURE — 85025 COMPLETE CBC W/AUTO DIFF WBC: CPT | Performed by: INTERNAL MEDICINE

## 2019-02-25 PROCEDURE — 80197 ASSAY OF TACROLIMUS: CPT

## 2019-02-25 PROCEDURE — 25800030 ZZH RX IP 258 OP 636

## 2019-02-25 PROCEDURE — 80053 COMPREHEN METABOLIC PANEL: CPT | Performed by: INTERNAL MEDICINE

## 2019-02-25 PROCEDURE — 00000146 ZZHCL STATISTIC GLUCOSE BY METER IP

## 2019-02-25 PROCEDURE — 87070 CULTURE OTHR SPECIMN AEROBIC: CPT | Performed by: INTERNAL MEDICINE

## 2019-02-25 PROCEDURE — 87899 AGENT NOS ASSAY W/OPTIC: CPT | Performed by: STUDENT IN AN ORGANIZED HEALTH CARE EDUCATION/TRAINING PROGRAM

## 2019-02-25 PROCEDURE — 87040 BLOOD CULTURE FOR BACTERIA: CPT | Performed by: INTERNAL MEDICINE

## 2019-02-25 PROCEDURE — 25000132 ZZH RX MED GY IP 250 OP 250 PS 637: Mod: GY | Performed by: INTERNAL MEDICINE

## 2019-02-25 PROCEDURE — A9270 NON-COVERED ITEM OR SERVICE: HCPCS | Mod: GY | Performed by: INTERNAL MEDICINE

## 2019-02-25 PROCEDURE — 87205 SMEAR GRAM STAIN: CPT | Performed by: INTERNAL MEDICINE

## 2019-02-25 PROCEDURE — 25000132 ZZH RX MED GY IP 250 OP 250 PS 637: Mod: GY

## 2019-02-25 PROCEDURE — 40000556 ZZH STATISTIC PERIPHERAL IV START W US GUIDANCE

## 2019-02-25 PROCEDURE — 99233 SBSQ HOSP IP/OBS HIGH 50: CPT | Mod: GC | Performed by: INTERNAL MEDICINE

## 2019-02-25 PROCEDURE — 87640 STAPH A DNA AMP PROBE: CPT

## 2019-02-25 PROCEDURE — A9270 NON-COVERED ITEM OR SERVICE: HCPCS | Mod: GY

## 2019-02-25 PROCEDURE — 25000125 ZZHC RX 250

## 2019-02-25 RX ORDER — OSELTAMIVIR PHOSPHATE 75 MG/1
75 CAPSULE ORAL 2 TIMES DAILY
Status: DISCONTINUED | OUTPATIENT
Start: 2019-02-25 | End: 2019-02-28 | Stop reason: HOSPADM

## 2019-02-25 RX ADMIN — OMEPRAZOLE 20 MG: 20 CAPSULE, DELAYED RELEASE ORAL at 09:10

## 2019-02-25 RX ADMIN — HUMAN INSULIN 8.5 UNITS/HR: 100 INJECTION, SOLUTION SUBCUTANEOUS at 19:38

## 2019-02-25 RX ADMIN — HUMAN INSULIN 9 UNITS/HR: 100 INJECTION, SOLUTION SUBCUTANEOUS at 09:03

## 2019-02-25 RX ADMIN — HYDROCORTISONE SODIUM SUCCINATE 50 MG: 100 INJECTION, POWDER, FOR SOLUTION INTRAMUSCULAR; INTRAVENOUS at 12:08

## 2019-02-25 RX ADMIN — HUMAN INSULIN 1 UNITS/HR: 100 INJECTION, SOLUTION SUBCUTANEOUS at 06:15

## 2019-02-25 RX ADMIN — FERROUS SULFATE TAB 325 MG (65 MG ELEMENTAL FE) 325 MG: 325 (65 FE) TAB at 12:09

## 2019-02-25 RX ADMIN — PIPERACILLIN SODIUM,TAZOBACTAM SODIUM 4.5 G: 4; .5 INJECTION, POWDER, FOR SOLUTION INTRAVENOUS at 01:48

## 2019-02-25 RX ADMIN — SULFAMETHOXAZOLE AND TRIMETHOPRIM 1 TABLET: 400; 80 TABLET ORAL at 12:09

## 2019-02-25 RX ADMIN — AZITHROMYCIN MONOHYDRATE 500 MG: 500 INJECTION, POWDER, LYOPHILIZED, FOR SOLUTION INTRAVENOUS at 10:58

## 2019-02-25 RX ADMIN — PIPERACILLIN SODIUM,TAZOBACTAM SODIUM 4.5 G: 4; .5 INJECTION, POWDER, FOR SOLUTION INTRAVENOUS at 06:09

## 2019-02-25 RX ADMIN — PIPERACILLIN SODIUM,TAZOBACTAM SODIUM 4.5 G: 4; .5 INJECTION, POWDER, FOR SOLUTION INTRAVENOUS at 19:39

## 2019-02-25 RX ADMIN — OSELTAMIVIR PHOSPHATE 75 MG: 75 CAPSULE ORAL at 19:26

## 2019-02-25 RX ADMIN — HYDROCORTISONE SODIUM SUCCINATE 50 MG: 100 INJECTION, POWDER, FOR SOLUTION INTRAMUSCULAR; INTRAVENOUS at 19:47

## 2019-02-25 RX ADMIN — HUMAN INSULIN 2 UNITS/HR: 100 INJECTION, SOLUTION SUBCUTANEOUS at 03:22

## 2019-02-25 RX ADMIN — HUMAN INSULIN 7 UNITS/HR: 100 INJECTION, SOLUTION SUBCUTANEOUS at 09:59

## 2019-02-25 RX ADMIN — TACROLIMUS 3 MG: 1 CAPSULE ORAL at 19:25

## 2019-02-25 RX ADMIN — TACROLIMUS 3 MG: 1 CAPSULE ORAL at 09:09

## 2019-02-25 RX ADMIN — HUMAN INSULIN 3 UNITS/HR: 100 INJECTION, SOLUTION SUBCUTANEOUS at 01:46

## 2019-02-25 RX ADMIN — VALGANCICLOVIR HYDROCHLORIDE 450 MG: 450 TABLET ORAL at 09:10

## 2019-02-25 RX ADMIN — VALGANCICLOVIR HYDROCHLORIDE 450 MG: 450 TABLET ORAL at 19:25

## 2019-02-25 RX ADMIN — HUMAN INSULIN 2 UNITS/HR: 100 INJECTION, SOLUTION SUBCUTANEOUS at 12:07

## 2019-02-25 RX ADMIN — PIPERACILLIN SODIUM,TAZOBACTAM SODIUM 4.5 G: 4; .5 INJECTION, POWDER, FOR SOLUTION INTRAVENOUS at 12:09

## 2019-02-25 RX ADMIN — HUMAN INSULIN 1.5 UNITS/HR: 100 INJECTION, SOLUTION SUBCUTANEOUS at 07:33

## 2019-02-25 RX ADMIN — HYDROCORTISONE SODIUM SUCCINATE 50 MG: 100 INJECTION, POWDER, FOR SOLUTION INTRAMUSCULAR; INTRAVENOUS at 06:09

## 2019-02-25 RX ADMIN — Medication 1 TABLET: at 16:41

## 2019-02-25 RX ADMIN — OMEPRAZOLE 20 MG: 20 CAPSULE, DELAYED RELEASE ORAL at 16:40

## 2019-02-25 RX ADMIN — VANCOMYCIN HYDROCHLORIDE 1500 MG: 10 INJECTION, POWDER, LYOPHILIZED, FOR SOLUTION INTRAVENOUS at 05:02

## 2019-02-25 RX ADMIN — OSELTAMIVIR PHOSPHATE 75 MG: 75 CAPSULE ORAL at 09:09

## 2019-02-25 ASSESSMENT — ACTIVITIES OF DAILY LIVING (ADL)
ADLS_ACUITY_SCORE: 11

## 2019-02-25 ASSESSMENT — MIFFLIN-ST. JEOR: SCORE: 1470.75

## 2019-02-25 NOTE — PLAN OF CARE
"Assumed cares 0700 to 1500.     /76 (BP Location: Right arm)   Pulse 74   Temp 97.1  F (36.2  C) (Oral)   Resp 16   Ht 1.702 m (5' 7\")   SpO2 93%   BMI 25.06 kg/m       Pain: Declines.  Neuro: A and O x 4.  Respiratory: Lung sounds clear and equal on RA.  Cardiac/Neurovascular: HR and pulses WDL.   GI/: Bowel sounds active. BM today. Adequate UOP.  Nutrition: Ate 100% breakfast and lunch.  Activity: Up ind.  Skin: No concerns.  Lines: 2 PIVs in LUE, both infusing, sites WDL. Insulin gtt infusing; on algorithm 2 currently.  Events this shift: Tamiflu started today.      Plan: Need urine sample and sputum sample, pt aware. Continue to monitor.    "

## 2019-02-25 NOTE — PROGRESS NOTES
Resident/Fellow Attestation   I, Jerome Sharma, was present with the medical student who participated in the service and in the documentation of the note.  I have verified the history and personally performed the physical exam and medical decision making.  I agree with the assessment and plan of care as documented in the note.      Lung transplant patient with GNR bacteremia at OSH presenting with weakness and fevers in setting of recent flu exposure. Will restart Tamiflu and continue broad spectrum antibiotics. Sputum also had GNRs though with multiple squams. CXR looks clear and the patient is not complaining of only a mild cough and no shortness of breath. Urinalysis was negative.     Jerome Sharma MD  PGY3  Date of Service (when I saw the patient): 02/25/19    [facility], [place]    Progress Note - [place] 3 Service        Date of Admission:  2/24/2019    Assessment & Plan   Aubrey Duncan is a 62 yo male with a history of bilateral lung transplant 2/2 A1AT deficiency in 2013, T2DM, HIT, DVT, and hypertension who presents with weakness, fevers, mild cough in setting of recent influenzae exposure. Now with GNR bacteremia from OSH blood culture 2/24, the patient remains stable on broad-spectum antibiotics. Source unclear, potentially secondary bacterial pneumonia.    Today   Restarted Tamiflu     # GNR bacteremia  The patient initially presented with fever and hypotension but overnight has  been afebrile and hemodynamically stable. His chest X-ray showed no evidence of infiltrates or cardiopulmonary process. Procal is 1.4 and the WBC is 2.1 with an ANC at 1.6 (up from 0.6). Differential includes secondary bacterial pneumonia in setting of recent influenza infection vs bronchitis. Sputum culture grew some non lactose fermenting GNRs (concern for Pseudomonas) and MRSA nares were negative (though sputum with multiple squamous cells were noted).  - Follow up blood and sputum cultures  -  Continue Vanc/Zosyn/Azithromycin; consider change based on sensitivities and culture results  - Droplet isolation  - Holding azathioprine 25 mg daily  - Continue stress dose steroids  - Repeat CXR tomorrow    # Influenza A  Recent exposure to wife and treatment x5 days of therapy.  - restart Tamiflu given immunosuppressed status    # T2DM, A1C 12.4%  Suspect the poor control is 2/2 to long term steroid use and inadequate diet control. Patient was started on insulin drip in ED.  - continue insulin drip overnight given labile BP control and transition to subQ tomorrow  - Hypoglycemia protocol    # Hx of Bilateral Lung Transplant for A1AT deficiency   CMV: d+/r- EBV: d+/r+  - Holding azathioprine 25 mg daily  - Holding prednisone 5 + 7.5 mg given stress dose steroids  - Continue PTA valcyte  - Continue PTA tacrolimus; will obtain another trough in the morning  - Continue bactrim     # Elevated Alk Phos (702 2/25/19)  Other LFTs are WNL. The patient has been afebrile and has a benign abdominal exam. No indication for further work up at this time.   - trend    # SHELLEY , resolving   Creatinine improved from 1.42 yesterday to 1.13 today. Etiology likely secondary to prerenal/hypotension.  - Hold PTA Lisinopril and Lasix     Chronic:  HTN: Holding PTA lisinopril, metoprolol, lasix in setting of SHELLEY  Diarrhea: Held PTA loperamide   GERD: Continue PTA omeprazole       Diet: Room Service  Consistent Carbohydrate Diet 9204-7380 Calories: Moderate Consistent CHO (4-6 CHO units/meal)    Fluids: PO  Lines: PIV  DVT Prophylaxis: Pneumatic Compression Devices  Naranjo Catheter: not present  Code Status: DNR/DNI      Disposition Plan   Expected discharge: 2 - 3 days, recommended to prior living arrangement once stable for discharge and treatment plan in place.  Entered: Jose Mao 02/25/2019, 11:52 AM       The patient's care was discussed with the Attending Physician, Dr. Burton.    Jose Mao  Medical  Student  [place] 3 Service  [facility] [place]  Pager: [phone]  Please see sticky note for cross cover information  ______________________________________________________________________    Interval History   Nursing notes and overnight events reviewed.     No acute events overnight. He reports that he was not able to sleep well secondary to repeated awakenings. He otherwise does not report any chest pain, increased shortness of breath, fevers, chills, nausea, or vomiting. He is tolerating PO intake and his diet.     4 point ROS is negative unless stated above     Data reviewed today: I reviewed all medications, new labs and imaging results over the last 24 hours.     XR Chest Port 1 View 2/25/19  - No acute cardiopulmonary disease  - No signs of infiltrate     Physical Exam   Vital Signs: Temp: 95.5  F (35.3  C) Temp src: Axillary BP: 115/66 Pulse: 84 Heart Rate: 90 Resp: 16 SpO2: 96 % O2 Device: None (Room air)    Weight: 0 lbs 0 oz    General: Awake, alert, and cooperative   HEENT: Head atraumatic. Pupils equal and reactive to light. Mucus membranes dry. No oropharyngeal exudate and erythema.   Resp: Appears in no acute distress. Coarse breath sounds on the right.   CV: Regular rate and rhythm. No murmurs. DP, PT, and radial pulses palpable bilaterally.   Abd: Soft, non-tender. Normal bowel sounds. No rebound, guarding, or rigidity.   MS: No focal deformities. No edema bilaterally.   Neuro: No focal deficits. 4/5 strength in UE and LE bilaterally.   Skin: Warm and Dry. No rashes or lesions  Psych: Normal affect       Data   Recent Labs   Lab 02/25/19  0531 02/24/19  1039   WBC 2.1* 1.7*   HGB 11.0* 11.5*   * 98   * 128*    130*   POTASSIUM 5.6* 4.9   CHLORIDE 104 97*   CO2 24 23   BUN 26 29*   CR 1.13 1.42*   ANIONGAP 8 10   ERIN 8.3* 8.7   * 381*   ALBUMIN 2.4* 3.3*   PROTTOTAL 5.9* 6.2*   BILITOTAL 0.4 0.6   ALKPHOS 702* 579*   ALT 27 26   AST 26 26   TROPI   --  <0.030     BNP - 212   CRP - 79  Procal - 1.4   Lactic Acid - 1.7  A1C - 12.4   MRSA nares - negative   Resp viral panel - pending     Pt was seen and examined by me; confirmed lung exam findings, reviewed labs, vitals, imaging.  I agree with the detailed A/P documentation above.    Monie Burton MD

## 2019-02-25 NOTE — PLAN OF CARE
Adult Inpatient Plan of Care  Plan of Care Review  2/25/2019 0629 - No Change by Claudia Le RN   A:  patient denies pain.  Occasional productive cough.  Lung sounds clear with occasional squeaks.  Up in room independently.  Gait steady.  Vital signs stable.  Patient alert and oriented times 4.  MRSA swab sent to lab.  On insulin drip with hourly blood sugar checks.  At 1 unit(s) per hour with blood sugar of 158.  Vancomycin and zosyn antibiotics infusing.  Ate 100% if courtesy meal at HS.    R:  continue hourly blood sugars and continue to monitor and treat per plan of care.

## 2019-02-25 NOTE — SUMMARY OF CARE
Patient arrived from [facility] ED per ambulance with personal belongings.  Two plastic bags with clothing, shoes, jacket, pullover, keys, cell phone and .  Pair of reading glasses.  Denies pain.
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
## 2019-02-25 NOTE — PHARMACY-VANCOMYCIN DOSING SERVICE
Pharmacy [facility] Dose Change Per Policy  Original Dose Ordered: 1500mg vancomycin Q18H  Dose Changed To: 1500mg IV Vanco Q24H  This dose change was based on the pharmacist's assessment of this patient's age, weight, concurrent drug therapy, treatment goals, whether patient's creatinine clearance adequately indicates renal function (factoring in age, muscle mass, fluid and clinical status), and, if applicable, prior pharmacokinetic data.    Creatinine Clearance=     Estimated Creatinine Clearance: 66.9 mL/min (based on SCr of 1.13 mg/dL).  Will continue to follow and modify dosage according to levels, organ function and clinical condition    Esau Juarez PharmD, [facility]    [phone]  Pager [phone]

## 2019-02-25 NOTE — PHARMACY-VANCOMYCIN DOSING SERVICE
Pharmacy Vancomycin Initial Note  Date of Service 2019  Patient's  1955  63 year old, male    Indication: Sepsis    Current estimated CrCl = Estimated Creatinine Clearance: 53.3 mL/min (A) (based on SCr of 1.42 mg/dL (H)).    Creatinine for last 3 days  2019: 10:39 AM Creatinine 1.42 mg/dL    Recent Vancomycin Level(s) for last 3 days  No results found for requested labs within last 72 hours.      Vancomycin IV Administrations (past 72 hours)                   Vancomycin HCl (VANCOCIN) 2,000 mg in sodium chloride 0.9 % 500 mL intermittent infusion (mg) 2,000 mg New Bag 19 1133                Nephrotoxins and other renal medications (From now, onward)    Start     Dose/Rate Route Frequency Ordered Stop    19 0530  vancomycin 1500 mg in 0.9% NaCl 250 ml intermittent infusion 1,500 mg      1,500 mg  over 90 Minutes Intravenous EVERY 18 HOURS 19 2223      19 2215  piperacillin-tazobactam (ZOSYN) 4.5 g vial to attach to  mL bag      4.5 g  over 30 Minutes Intravenous EVERY 6 HOURS 19 2200            Contrast Orders - past 72 hours (72h ago, onward)    None                Plan:  1.  Start vancomycin  2000 mg IV once (given @1130) then 1500 mg IV q18h.   2.  Goal Trough Level: 15-20 mg/L   3.  Pharmacy will check trough levels as appropriate in 1-3 Days.    4. Serum creatinine levels will be ordered daily for the first week of therapy and at least twice weekly for subsequent weeks.    5. Sumner method utilized to dose vancomycin therapy: Method 2    Osvaldo Perez, PharmD

## 2019-02-26 ENCOUNTER — APPOINTMENT (OUTPATIENT)
Dept: GENERAL RADIOLOGY | Facility: CLINIC | Age: 64
DRG: 871 | End: 2019-02-26
Attending: INTERNAL MEDICINE
Payer: MEDICARE

## 2019-02-26 LAB
ALBUMIN SERPL-MCNC: 2.4 G/DL (ref 3.4–5)
ALP SERPL-CCNC: 651 U/L (ref 40–150)
ALT SERPL W P-5'-P-CCNC: 27 U/L (ref 0–70)
ANION GAP SERPL CALCULATED.3IONS-SCNC: 11 MMOL/L (ref 3–14)
AST SERPL W P-5'-P-CCNC: 28 U/L (ref 0–45)
BACTERIA SPEC CULT: ABNORMAL
BILIRUB SERPL-MCNC: 0.4 MG/DL (ref 0.2–1.3)
BUN SERPL-MCNC: 30 MG/DL (ref 7–30)
CALCIUM SERPL-MCNC: 8.1 MG/DL (ref 8.5–10.1)
CHLORIDE SERPL-SCNC: 108 MMOL/L (ref 94–109)
CO2 SERPL-SCNC: 21 MMOL/L (ref 20–32)
CREAT SERPL-MCNC: 1.11 MG/DL (ref 0.66–1.25)
ERYTHROCYTE [DISTWIDTH] IN BLOOD BY AUTOMATED COUNT: 13 % (ref 10–15)
GFR SERPL CREATININE-BSD FRML MDRD: 69 ML/MIN/{1.73_M2}
GLUCOSE BLDC GLUCOMTR-MCNC: 103 MG/DL (ref 70–99)
GLUCOSE BLDC GLUCOMTR-MCNC: 111 MG/DL (ref 70–99)
GLUCOSE BLDC GLUCOMTR-MCNC: 116 MG/DL (ref 70–99)
GLUCOSE BLDC GLUCOMTR-MCNC: 122 MG/DL (ref 70–99)
GLUCOSE BLDC GLUCOMTR-MCNC: 127 MG/DL (ref 70–99)
GLUCOSE BLDC GLUCOMTR-MCNC: 129 MG/DL (ref 70–99)
GLUCOSE BLDC GLUCOMTR-MCNC: 130 MG/DL (ref 70–99)
GLUCOSE BLDC GLUCOMTR-MCNC: 135 MG/DL (ref 70–99)
GLUCOSE BLDC GLUCOMTR-MCNC: 138 MG/DL (ref 70–99)
GLUCOSE BLDC GLUCOMTR-MCNC: 140 MG/DL (ref 70–99)
GLUCOSE BLDC GLUCOMTR-MCNC: 150 MG/DL (ref 70–99)
GLUCOSE BLDC GLUCOMTR-MCNC: 159 MG/DL (ref 70–99)
GLUCOSE BLDC GLUCOMTR-MCNC: 170 MG/DL (ref 70–99)
GLUCOSE BLDC GLUCOMTR-MCNC: 173 MG/DL (ref 70–99)
GLUCOSE BLDC GLUCOMTR-MCNC: 200 MG/DL (ref 70–99)
GLUCOSE BLDC GLUCOMTR-MCNC: 85 MG/DL (ref 70–99)
GLUCOSE SERPL-MCNC: 137 MG/DL (ref 70–99)
HCT VFR BLD AUTO: 33.6 % (ref 40–53)
HGB BLD-MCNC: 10.9 G/DL (ref 13.3–17.7)
MCH RBC QN AUTO: 33.1 PG (ref 26.5–33)
MCHC RBC AUTO-ENTMCNC: 32.4 G/DL (ref 31.5–36.5)
MCV RBC AUTO: 102 FL (ref 78–100)
PLATELET # BLD AUTO: 154 10E9/L (ref 150–450)
POTASSIUM SERPL-SCNC: 4.4 MMOL/L (ref 3.4–5.3)
PROT SERPL-MCNC: 5.5 G/DL (ref 6.8–8.8)
RBC # BLD AUTO: 3.29 10E12/L (ref 4.4–5.9)
SODIUM SERPL-SCNC: 140 MMOL/L (ref 133–144)
SPECIMEN SOURCE: ABNORMAL
TACROLIMUS BLD-MCNC: 18.3 UG/L (ref 5–15)
TME LAST DOSE: ABNORMAL H
WBC # BLD AUTO: 2 10E9/L (ref 4–11)

## 2019-02-26 PROCEDURE — 25800030 ZZH RX IP 258 OP 636

## 2019-02-26 PROCEDURE — 36415 COLL VENOUS BLD VENIPUNCTURE: CPT | Performed by: INTERNAL MEDICINE

## 2019-02-26 PROCEDURE — 25000132 ZZH RX MED GY IP 250 OP 250 PS 637: Mod: GY | Performed by: INTERNAL MEDICINE

## 2019-02-26 PROCEDURE — A9270 NON-COVERED ITEM OR SERVICE: HCPCS | Mod: GY | Performed by: INTERNAL MEDICINE

## 2019-02-26 PROCEDURE — A9270 NON-COVERED ITEM OR SERVICE: HCPCS | Mod: GY

## 2019-02-26 PROCEDURE — 00000146 ZZHCL STATISTIC GLUCOSE BY METER IP

## 2019-02-26 PROCEDURE — 25000131 ZZH RX MED GY IP 250 OP 636 PS 637: Mod: GY | Performed by: STUDENT IN AN ORGANIZED HEALTH CARE EDUCATION/TRAINING PROGRAM

## 2019-02-26 PROCEDURE — 25000131 ZZH RX MED GY IP 250 OP 636 PS 637: Mod: GY

## 2019-02-26 PROCEDURE — 25000132 ZZH RX MED GY IP 250 OP 250 PS 637: Mod: GY

## 2019-02-26 PROCEDURE — 85027 COMPLETE CBC AUTOMATED: CPT | Performed by: INTERNAL MEDICINE

## 2019-02-26 PROCEDURE — 00000146 ZZHCL STATISTIC GLUCOSE BY METER IP: Performed by: INTERNAL MEDICINE

## 2019-02-26 PROCEDURE — 25000128 H RX IP 250 OP 636

## 2019-02-26 PROCEDURE — 25000131 ZZH RX MED GY IP 250 OP 636 PS 637: Mod: GY | Performed by: INTERNAL MEDICINE

## 2019-02-26 PROCEDURE — 80197 ASSAY OF TACROLIMUS: CPT | Performed by: INTERNAL MEDICINE

## 2019-02-26 PROCEDURE — 80053 COMPREHEN METABOLIC PANEL: CPT | Performed by: INTERNAL MEDICINE

## 2019-02-26 PROCEDURE — 71045 X-RAY EXAM CHEST 1 VIEW: CPT

## 2019-02-26 PROCEDURE — 99233 SBSQ HOSP IP/OBS HIGH 50: CPT | Mod: GC | Performed by: INTERNAL MEDICINE

## 2019-02-26 PROCEDURE — 12000001 ZZH R&B MED SURG/OB UMMC

## 2019-02-26 PROCEDURE — 25000125 ZZHC RX 250

## 2019-02-26 RX ORDER — PREDNISONE 5 MG/1
5 TABLET ORAL DAILY
Status: DISCONTINUED | OUTPATIENT
Start: 2019-02-27 | End: 2019-02-28 | Stop reason: HOSPADM

## 2019-02-26 RX ORDER — METOPROLOL TARTRATE 25 MG/1
25 TABLET, FILM COATED ORAL 2 TIMES DAILY
Status: DISCONTINUED | OUTPATIENT
Start: 2019-02-26 | End: 2019-02-28 | Stop reason: HOSPADM

## 2019-02-26 RX ORDER — PREDNISONE 2.5 MG/1
2.5 TABLET ORAL AT BEDTIME
Status: DISCONTINUED | OUTPATIENT
Start: 2019-02-26 | End: 2019-02-28 | Stop reason: HOSPADM

## 2019-02-26 RX ORDER — ATOVAQUONE 750 MG/5ML
1500 SUSPENSION ORAL DAILY
Status: DISCONTINUED | OUTPATIENT
Start: 2019-02-26 | End: 2019-02-28 | Stop reason: HOSPADM

## 2019-02-26 RX ADMIN — PIPERACILLIN SODIUM,TAZOBACTAM SODIUM 4.5 G: 4; .5 INJECTION, POWDER, FOR SOLUTION INTRAVENOUS at 05:35

## 2019-02-26 RX ADMIN — ATOVAQUONE 1500 MG: 750 SUSPENSION ORAL at 20:33

## 2019-02-26 RX ADMIN — PIPERACILLIN SODIUM,TAZOBACTAM SODIUM 4.5 G: 4; .5 INJECTION, POWDER, FOR SOLUTION INTRAVENOUS at 00:32

## 2019-02-26 RX ADMIN — HUMAN INSULIN 2 UNITS/HR: 100 INJECTION, SOLUTION SUBCUTANEOUS at 07:19

## 2019-02-26 RX ADMIN — PIPERACILLIN SODIUM,TAZOBACTAM SODIUM 4.5 G: 4; .5 INJECTION, POWDER, FOR SOLUTION INTRAVENOUS at 16:42

## 2019-02-26 RX ADMIN — OSELTAMIVIR PHOSPHATE 75 MG: 75 CAPSULE ORAL at 20:33

## 2019-02-26 RX ADMIN — METOPROLOL TARTRATE 25 MG: 25 TABLET ORAL at 20:41

## 2019-02-26 RX ADMIN — AZITHROMYCIN MONOHYDRATE 500 MG: 500 INJECTION, POWDER, LYOPHILIZED, FOR SOLUTION INTRAVENOUS at 11:33

## 2019-02-26 RX ADMIN — HUMAN INSULIN 3 UNITS/HR: 100 INJECTION, SOLUTION SUBCUTANEOUS at 11:32

## 2019-02-26 RX ADMIN — HYDROCORTISONE SODIUM SUCCINATE 50 MG: 100 INJECTION, POWDER, FOR SOLUTION INTRAMUSCULAR; INTRAVENOUS at 05:35

## 2019-02-26 RX ADMIN — OMEPRAZOLE 20 MG: 20 CAPSULE, DELAYED RELEASE ORAL at 16:42

## 2019-02-26 RX ADMIN — PREDNISONE 2.5 MG: 2.5 TABLET ORAL at 20:33

## 2019-02-26 RX ADMIN — OMEPRAZOLE 20 MG: 20 CAPSULE, DELAYED RELEASE ORAL at 09:14

## 2019-02-26 RX ADMIN — OSELTAMIVIR PHOSPHATE 75 MG: 75 CAPSULE ORAL at 09:11

## 2019-02-26 RX ADMIN — PIPERACILLIN SODIUM,TAZOBACTAM SODIUM 4.5 G: 4; .5 INJECTION, POWDER, FOR SOLUTION INTRAVENOUS at 23:24

## 2019-02-26 RX ADMIN — Medication 1 TABLET: at 20:33

## 2019-02-26 RX ADMIN — HYDROCORTISONE SODIUM SUCCINATE 50 MG: 100 INJECTION, POWDER, FOR SOLUTION INTRAMUSCULAR; INTRAVENOUS at 11:33

## 2019-02-26 RX ADMIN — HUMAN INSULIN 4 UNITS/HR: 100 INJECTION, SOLUTION SUBCUTANEOUS at 10:32

## 2019-02-26 RX ADMIN — HUMAN INSULIN 4 UNITS/HR: 100 INJECTION, SOLUTION SUBCUTANEOUS at 02:30

## 2019-02-26 RX ADMIN — Medication 1 TABLET: at 09:14

## 2019-02-26 RX ADMIN — TACROLIMUS 2.5 MG: 1 CAPSULE ORAL at 20:33

## 2019-02-26 RX ADMIN — FERROUS SULFATE TAB 325 MG (65 MG ELEMENTAL FE) 325 MG: 325 (65 FE) TAB at 11:33

## 2019-02-26 RX ADMIN — VALGANCICLOVIR HYDROCHLORIDE 450 MG: 450 TABLET ORAL at 20:33

## 2019-02-26 RX ADMIN — TACROLIMUS 3 MG: 1 CAPSULE ORAL at 09:14

## 2019-02-26 RX ADMIN — VALGANCICLOVIR HYDROCHLORIDE 450 MG: 450 TABLET ORAL at 09:14

## 2019-02-26 RX ADMIN — Medication 25 MG: at 20:33

## 2019-02-26 RX ADMIN — HYDROCORTISONE SODIUM SUCCINATE 50 MG: 100 INJECTION, POWDER, FOR SOLUTION INTRAMUSCULAR; INTRAVENOUS at 00:28

## 2019-02-26 RX ADMIN — INSULIN ASPART 8 UNITS: 100 INJECTION, SOLUTION INTRAVENOUS; SUBCUTANEOUS at 09:15

## 2019-02-26 RX ADMIN — INSULIN GLARGINE 30 UNITS: 100 INJECTION, SOLUTION SUBCUTANEOUS at 09:15

## 2019-02-26 ASSESSMENT — ACTIVITIES OF DAILY LIVING (ADL)
ADLS_ACUITY_SCORE: 11

## 2019-02-26 ASSESSMENT — MIFFLIN-ST. JEOR: SCORE: 1466.66
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
## 2019-02-26 NOTE — PLAN OF CARE
Up independently in room. Denies pain. Sputum culture sent, but inadequate specimen. Will need to reorder and obtain another specimen. NOC RN aware. UA sent. Hourly blood sugars with Insulin gtt. Was on algorithm 3, then turned gtt off because 2300 BG=87. Will recheck bg in 1 hour. Cont to monitor and with poc.

## 2019-02-26 NOTE — PROGRESS NOTES
Resident/Fellow Attestation   I, Jerome Sharma, was present with the medical student who participated in the service and in the documentation of the note.  I have verified the history and personally performed the physical exam and medical decision making.  I agree with the assessment and plan of care as documented in the note.      Narrowing to Zosyn for Pseudomonas pneumonia and bacteremia.    Jerome Sharma MD  PGY3  Date of Service (when I saw the patient): 02/26/19    [facility], [place]    Progress Note - [place] 3 Service        Date of Admission:  2/24/2019    Assessment & Plan   Aubrey Duncan is a 62 yo male with a history of bilateral lung transplant 2/2 A1AT deficiency in 2013, T2DM, HIT, DVT, and hypertension who presents with weakness, fevers, mild cough in setting of recent influenzae exposure. Appears to be Pseudomonas pneumonia and bacteremia.    Today  - Repeat CXR   - Restarted Prednisone (2.5 mg PO at Bedtime; 5 mg PO QDay) and stopped stress dose steroids  - Discontinued Vancomycin and Azithromycin   - Continued IV Zosyn     # Pseudomonas pneumonia and bacteremia   The patient initially presented with fever and hypotension but has been afebrile and hemodynamically stable. Procal is 1.4 and the WBC is 2.0. Chest X-ray 2/26/19 showed bibasilar and retrocardiac pulmonary consolidation. GNRs in 1/4 bottles 2/24 and sputum grew Pseudomonas.   - Follow up cultures  - Discontinue Vancomycin and Azithromycin; Continue Zosyn IV  - Holding azathioprine 25 mg daily  - Stop stress dose steroids    # Influenza A  Recent exposure to wife and treatment x5 days of therapy.  - Droplet isolation  - Continue Tamiflu     # T2DM, A1C 12.4%  Suspect the poor control is 2/2 to long term steroid use and inadequate diet control. Patient was started on insulin drip in ED.   - Insulin drip stopped during AM rounds  - Started on 30 units glargine and 10 units TID w/meals with high dose sliding  scale  - Hypoglycemia protocol    # Hx of Bilateral Lung Transplant for A1AT deficiency   CMV: d+/r- EBV: d+/r+  - Holding azathioprine 25 mg daily  - Discontinued stress dose hydrocortisone, restart Prednisone (2.5 mg PO at Bedtime; 5 mg PO QDay)   - Continue PTA valcyte  - Continue PTA tacrolimus; will obtain another trough in the morning  - Continue bactrim      # Elevated Alk Phos (651 2/26/19)  Other LFTs are WNL. The patient has been afebrile and has a benign abdominal exam. No indication for further work up at this time.   - trend     # SHELLEY, resolving   Creatinine improved from 1.42 yesterday to 1.13 today. Etiology likely secondary to prerenal/hypotension.  - Hold PTA Lisinopril and Lasix      Chronic:  HTN: Holding PTA lisinopril, metoprolol, lasix in setting of SHELLEY  Diarrhea: Held PTA loperamide   GERD: Continue PTA omeprazole     Diet: Room Service  Consistent Carbohydrate Diet 5385-0720 Calories: Moderate Consistent CHO (4-6 CHO units/meal)    Fluids: PO  Lines: PIV  DVT Prophylaxis: Pneumatic Compression Devices  Naranjo Catheter: not present  Code Status: DNR/DNI      Disposition Plan   Expected discharge: 2 - 3 days, recommended to prior living arrangement once stable for discharge and treatment plan in place.  Entered: Jose Mao 02/26/2019, 2:42 PM       The patient's care was discussed with the Attending Physician, Dr. Solomon.    Jose Mao  Medical Student  [facility] 3 Service  [facility], [place]  Pager: [phone]  Please see sticky note for cross cover information  ______________________________________________________________________    Interval History   Nursing notes and overnight events reviewed.     No acute events overnight. The patient reports that he had increased sputum production and some increased upper mid chest heaviness. He otherwise does not report any focal chest pain, increased shortness of breath, nausea, vomiting, urinary symptoms, or  hematochezia. He is tolerating his diet.     4 point ROS is negative unless stated above     Data reviewed today: I reviewed all medications, new labs and imaging results over the last 24 hours.     XR Chest Port 1 View 2/26/19  IMPRESSION: Bibasilar and retrocardiac pulmonary consolidation.    Physical Exam   Vital Signs: Temp: 96.3  F (35.7  C) Temp src: Oral BP: 146/75 Pulse: 83 Heart Rate: 71 Resp: 16 SpO2: 96 % O2 Device: None (Room air)    Weight: 160 lbs 4.8 oz    General: Awake, alert, and cooperative. Sitting up in bed.   HEENT: Head atraumatic. Pupils equal and reactive to light. Mucus membranes dry. No oropharyngeal exudate and erythema.   Resp: Appears in no acute distress. Coarse breath sounds on the right.   CV: Regular rate and rhythm. No murmurs. DP, PT, and radial pulses palpable bilaterally.   Abd: Soft, non-tender. Normal bowel sounds. No rebound, guarding, or rigidity.   MS: No focal deformities. No edema bilaterally.   Neuro: No focal deficits. 4/5 strength in UE and LE bilaterally.   Skin: Warm and Dry. No rashes or lesions  Psych: Normal affect     Data   Lab 02/26/19  0630   WBC 2.0*   HGB 10.9*   *         POTASSIUM 4.4   CHLORIDE 108   CO2 21   BUN 30   CR 1.11   ANIONGAP 11   ERIN 8.1*   *   ALBUMIN 2.4*   PROTTOTAL 5.5*   BILITOTAL 0.4   ALKPHOS 651*   ALT 27   AST 28   TROPI  --      All cultures:  Recent Labs   Lab 02/25/19  1814 02/25/19  1635 02/24/19  1400 02/24/19  1039   CULT No growth after 11 hours Canceled, Test credited  >10 Squamous epithelial cells/low power field indicates oral contamination. Please   recollect.  *  Notification of test cancellation was given to  Yohana Rasheed RN, @2808 02/25/19.DH   Moderate growth  Pseudomonas aeruginosa  * Gram negative bacilli*  1 of 4 bottles positive.  No growth after 20 hours

## 2019-02-26 NOTE — PLAN OF CARE
Pt A&Ox4. Up ad aviva in room. Denies pain. Scheduled antibiotics and corticosteroid given. Insulin gttp currently running at 1U/hr on Alg 2. Will continue to monitor and follow POC.

## 2019-02-26 NOTE — PLAN OF CARE
"Assumed cares 0700 to 1500.     /75 (BP Location: Right arm)   Pulse 83   Temp 96.3  F (35.7  C) (Oral)   Resp 16   Ht 1.702 m (5' 7\")   Wt 72.7 kg (160 lb 4.8 oz)   SpO2 96%   BMI 25.11 kg/m       Pain: Declines.  Neuro: A and O x 4.  Respiratory: Lung sounds display crackles in R lobes, on RA.  Cardiac/Neurovascular: HR and pulses WDL.   GI/: Bowel sounds active. BM today, loose (pt baseline). Adequate UOP.  Nutrition: Ate 75% breakfast and 100% lunch.  Activity: Up ind.  Skin: No concerns.  Lines: 2 PIVs in LUE, both infusing, sites WDL. Insulin gtt infusing on algorithm 2 until about 1230 (was DC'd around this time).  Events this shift: Pt was started on lantus and aspart insulin and insulin gtt DC'd. Last  around 1400, need to continue to monitor BG closely still. Steroids changed to PO. ID consult placed. CXR performed today.     Plan: Need urine sample and sputum sample still, pt aware. Continue to monitor.       "

## 2019-02-27 LAB
ALBUMIN SERPL-MCNC: 2.2 G/DL (ref 3.4–5)
ALP SERPL-CCNC: 659 U/L (ref 40–150)
ALT SERPL W P-5'-P-CCNC: 28 U/L (ref 0–70)
ANION GAP SERPL CALCULATED.3IONS-SCNC: 9 MMOL/L (ref 3–14)
AST SERPL W P-5'-P-CCNC: 33 U/L (ref 0–45)
BACTERIA SPEC CULT: ABNORMAL
BACTERIA SPEC CULT: ABNORMAL
BASOPHILS # BLD AUTO: 0 10E9/L (ref 0–0.2)
BASOPHILS NFR BLD AUTO: 0 %
BILIRUB SERPL-MCNC: 0.4 MG/DL (ref 0.2–1.3)
BUN SERPL-MCNC: 28 MG/DL (ref 7–30)
CALCIUM SERPL-MCNC: 8.1 MG/DL (ref 8.5–10.1)
CHLORIDE SERPL-SCNC: 112 MMOL/L (ref 94–109)
CO2 SERPL-SCNC: 21 MMOL/L (ref 20–32)
CREAT SERPL-MCNC: 1.1 MG/DL (ref 0.66–1.25)
DIFFERENTIAL METHOD BLD: ABNORMAL
EOSINOPHIL # BLD AUTO: 0 10E9/L (ref 0–0.7)
EOSINOPHIL NFR BLD AUTO: 0 %
ERYTHROCYTE [DISTWIDTH] IN BLOOD BY AUTOMATED COUNT: 13.2 % (ref 10–15)
FLUAV H1 2009 PAND RNA SPEC QL NAA+PROBE: POSITIVE
FLUAV H1 RNA SPEC QL NAA+PROBE: NEGATIVE
FLUAV H3 RNA SPEC QL NAA+PROBE: NEGATIVE
FLUAV RNA SPEC QL NAA+PROBE: POSITIVE
FLUBV RNA SPEC QL NAA+PROBE: NEGATIVE
GFR SERPL CREATININE-BSD FRML MDRD: 70 ML/MIN/{1.73_M2}
GLUCOSE BLDC GLUCOMTR-MCNC: 123 MG/DL (ref 70–99)
GLUCOSE BLDC GLUCOMTR-MCNC: 136 MG/DL (ref 70–99)
GLUCOSE BLDC GLUCOMTR-MCNC: 64 MG/DL (ref 70–99)
GLUCOSE BLDC GLUCOMTR-MCNC: 90 MG/DL (ref 70–99)
GLUCOSE BLDC GLUCOMTR-MCNC: 95 MG/DL (ref 70–99)
GLUCOSE BLDC GLUCOMTR-MCNC: 97 MG/DL (ref 70–99)
GLUCOSE SERPL-MCNC: 95 MG/DL (ref 70–99)
GRAM STN SPEC: ABNORMAL
HADV DNA SPEC QL NAA+PROBE: NEGATIVE
HADV DNA SPEC QL NAA+PROBE: NEGATIVE
HCT VFR BLD AUTO: 35.2 % (ref 40–53)
HGB BLD-MCNC: 11.1 G/DL (ref 13.3–17.7)
HMPV RNA SPEC QL NAA+PROBE: NEGATIVE
HPIV1 RNA SPEC QL NAA+PROBE: NEGATIVE
HPIV2 RNA SPEC QL NAA+PROBE: NEGATIVE
HPIV3 RNA SPEC QL NAA+PROBE: NEGATIVE
L PNEUMO1 AG UR QL IA: NORMAL
LYMPHOCYTES # BLD AUTO: 1.1 10E9/L (ref 0.8–5.3)
LYMPHOCYTES NFR BLD AUTO: 47.8 %
Lab: ABNORMAL
MACROCYTES BLD QL SMEAR: PRESENT
MCH RBC QN AUTO: 32.4 PG (ref 26.5–33)
MCHC RBC AUTO-ENTMCNC: 31.5 G/DL (ref 31.5–36.5)
MCV RBC AUTO: 103 FL (ref 78–100)
MICROBIOLOGIST REVIEW: ABNORMAL
MONOCYTES # BLD AUTO: 0.1 10E9/L (ref 0–1.3)
MONOCYTES NFR BLD AUTO: 6.2 %
NEUTROPHILS # BLD AUTO: 1.1 10E9/L (ref 1.6–8.3)
NEUTROPHILS NFR BLD AUTO: 46 %
PLATELET # BLD AUTO: 196 10E9/L (ref 150–450)
PLATELET # BLD EST: ABNORMAL 10*3/UL
POTASSIUM SERPL-SCNC: 4.1 MMOL/L (ref 3.4–5.3)
PROT SERPL-MCNC: 5.2 G/DL (ref 6.8–8.8)
RBC # BLD AUTO: 3.43 10E12/L (ref 4.4–5.9)
RHINOVIRUS RNA SPEC QL NAA+PROBE: NEGATIVE
RSV RNA SPEC QL NAA+PROBE: NEGATIVE
RSV RNA SPEC QL NAA+PROBE: NEGATIVE
SODIUM SERPL-SCNC: 142 MMOL/L (ref 133–144)
SPECIMEN SOURCE: ABNORMAL
SPECIMEN SOURCE: NORMAL
WBC # BLD AUTO: 2.4 10E9/L (ref 4–11)

## 2019-02-27 PROCEDURE — 87205 SMEAR GRAM STAIN: CPT

## 2019-02-27 PROCEDURE — 85025 COMPLETE CBC W/AUTO DIFF WBC: CPT | Performed by: INTERNAL MEDICINE

## 2019-02-27 PROCEDURE — A9270 NON-COVERED ITEM OR SERVICE: HCPCS | Mod: GY

## 2019-02-27 PROCEDURE — 25000128 H RX IP 250 OP 636

## 2019-02-27 PROCEDURE — 87899 AGENT NOS ASSAY W/OPTIC: CPT | Performed by: INTERNAL MEDICINE

## 2019-02-27 PROCEDURE — 25000132 ZZH RX MED GY IP 250 OP 250 PS 637: Mod: GY

## 2019-02-27 PROCEDURE — 80053 COMPREHEN METABOLIC PANEL: CPT | Performed by: INTERNAL MEDICINE

## 2019-02-27 PROCEDURE — 99233 SBSQ HOSP IP/OBS HIGH 50: CPT | Mod: GC | Performed by: INTERNAL MEDICINE

## 2019-02-27 PROCEDURE — 00000146 ZZHCL STATISTIC GLUCOSE BY METER IP

## 2019-02-27 PROCEDURE — 25000132 ZZH RX MED GY IP 250 OP 250 PS 637: Mod: GY | Performed by: INTERNAL MEDICINE

## 2019-02-27 PROCEDURE — 25000131 ZZH RX MED GY IP 250 OP 636 PS 637: Mod: GY | Performed by: STUDENT IN AN ORGANIZED HEALTH CARE EDUCATION/TRAINING PROGRAM

## 2019-02-27 PROCEDURE — 25000125 ZZHC RX 250: Performed by: INTERNAL MEDICINE

## 2019-02-27 PROCEDURE — 12000001 ZZH R&B MED SURG/OB UMMC

## 2019-02-27 PROCEDURE — A9270 NON-COVERED ITEM OR SERVICE: HCPCS | Mod: GY | Performed by: INTERNAL MEDICINE

## 2019-02-27 PROCEDURE — 25000131 ZZH RX MED GY IP 250 OP 636 PS 637: Mod: GY | Performed by: INTERNAL MEDICINE

## 2019-02-27 RX ORDER — FUROSEMIDE 20 MG
20 TABLET ORAL DAILY
Status: DISCONTINUED | OUTPATIENT
Start: 2019-02-27 | End: 2019-02-28 | Stop reason: HOSPADM

## 2019-02-27 RX ORDER — LOPERAMIDE HCL 2 MG
2 CAPSULE ORAL 2 TIMES DAILY
Status: CANCELLED | OUTPATIENT
Start: 2019-02-27

## 2019-02-27 RX ORDER — CIPROFLOXACIN 500 MG/1
500 TABLET, FILM COATED ORAL EVERY 12 HOURS SCHEDULED
Status: DISCONTINUED | OUTPATIENT
Start: 2019-02-27 | End: 2019-02-28 | Stop reason: HOSPADM

## 2019-02-27 RX ORDER — LISINOPRIL 5 MG/1
5 TABLET ORAL DAILY
Status: DISCONTINUED | OUTPATIENT
Start: 2019-02-27 | End: 2019-02-28 | Stop reason: HOSPADM

## 2019-02-27 RX ORDER — LOPERAMIDE HCL 2 MG
2 CAPSULE ORAL 4 TIMES DAILY PRN
Status: DISCONTINUED | OUTPATIENT
Start: 2019-02-27 | End: 2019-02-28 | Stop reason: HOSPADM

## 2019-02-27 RX ADMIN — FERROUS SULFATE TAB 325 MG (65 MG ELEMENTAL FE) 325 MG: 325 (65 FE) TAB at 11:45

## 2019-02-27 RX ADMIN — LOPERAMIDE HYDROCHLORIDE 2 MG: 2 CAPSULE ORAL at 13:07

## 2019-02-27 RX ADMIN — VALGANCICLOVIR HYDROCHLORIDE 450 MG: 450 TABLET ORAL at 20:28

## 2019-02-27 RX ADMIN — ATOVAQUONE 1500 MG: 750 SUSPENSION ORAL at 08:22

## 2019-02-27 RX ADMIN — TACROLIMUS 2.5 MG: 1 CAPSULE ORAL at 08:22

## 2019-02-27 RX ADMIN — PIPERACILLIN SODIUM,TAZOBACTAM SODIUM 4.5 G: 4; .5 INJECTION, POWDER, FOR SOLUTION INTRAVENOUS at 11:45

## 2019-02-27 RX ADMIN — OMEPRAZOLE 20 MG: 20 CAPSULE, DELAYED RELEASE ORAL at 08:23

## 2019-02-27 RX ADMIN — VALGANCICLOVIR HYDROCHLORIDE 450 MG: 450 TABLET ORAL at 08:22

## 2019-02-27 RX ADMIN — LISINOPRIL 5 MG: 5 TABLET ORAL at 08:32

## 2019-02-27 RX ADMIN — METOPROLOL TARTRATE 25 MG: 25 TABLET ORAL at 08:22

## 2019-02-27 RX ADMIN — CIPROFLOXACIN HYDROCHLORIDE 500 MG: 500 TABLET, FILM COATED ORAL at 20:28

## 2019-02-27 RX ADMIN — Medication 25 MG: at 08:22

## 2019-02-27 RX ADMIN — OMEPRAZOLE 20 MG: 20 CAPSULE, DELAYED RELEASE ORAL at 16:05

## 2019-02-27 RX ADMIN — PREDNISONE 2.5 MG: 2.5 TABLET ORAL at 21:18

## 2019-02-27 RX ADMIN — CIPROFLOXACIN HYDROCHLORIDE 500 MG: 500 TABLET, FILM COATED ORAL at 14:28

## 2019-02-27 RX ADMIN — Medication 1 TABLET: at 18:20

## 2019-02-27 RX ADMIN — OSELTAMIVIR PHOSPHATE 75 MG: 75 CAPSULE ORAL at 21:15

## 2019-02-27 RX ADMIN — FUROSEMIDE 20 MG: 20 TABLET ORAL at 08:32

## 2019-02-27 RX ADMIN — PIPERACILLIN SODIUM,TAZOBACTAM SODIUM 4.5 G: 4; .5 INJECTION, POWDER, FOR SOLUTION INTRAVENOUS at 05:46

## 2019-02-27 RX ADMIN — Medication 1 TABLET: at 08:22

## 2019-02-27 RX ADMIN — METOPROLOL TARTRATE 25 MG: 25 TABLET ORAL at 20:28

## 2019-02-27 RX ADMIN — LOPERAMIDE HYDROCHLORIDE 2 MG: 2 CAPSULE ORAL at 19:22

## 2019-02-27 RX ADMIN — PREDNISONE 5 MG: 5 TABLET ORAL at 08:23

## 2019-02-27 RX ADMIN — TACROLIMUS 2.5 MG: 1 CAPSULE ORAL at 20:28

## 2019-02-27 RX ADMIN — OSELTAMIVIR PHOSPHATE 75 MG: 75 CAPSULE ORAL at 08:32

## 2019-02-27 ASSESSMENT — ACTIVITIES OF DAILY LIVING (ADL)
ADLS_ACUITY_SCORE: 11

## 2019-02-27 ASSESSMENT — MIFFLIN-ST. JEOR: SCORE: 1452.6

## 2019-02-27 NOTE — PLAN OF CARE
Time: 0700 - 1500  Reason for admission: Pt admitted 2/24 with sepsis.  Hx: bilateral lung tx (2013), A1AT deficiency, DM2, HIT, DVT, HTN  VS: VSS on RA. Denies any pain. Vitals q4h.   Activity: Independent. Ambulated in halls x 3 this shift.   Neuros: Alert and oriented x 4.   Cardiac: Denied any CP.   Respiratory: Denied any SOB. LS coarse in R base.   GI/: +gas/+BS. LBM today. Chronic diarrhea - PRN imodium QID (likes with each meal and at bedtime). Denies any abd pain, N/V. Good UO, voids in urinal.   Diet: Regular diet, tolerating well.   Skin: WDL. Repositions self in bed.   Lines: PIV to RFA SL.  Labs: positive for influenza A, absolute neutrophils 1.1 this am. BS 90 and 95 this shift.   New changes this shift:  Urine sample sent, waiting on results. Started on neutropenic precautions.   Plan: Continue tamiflu and abx.  Will continue to monitor & follow POC.

## 2019-02-27 NOTE — CONSULTS
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 281.00 U/mL Final     Comment:     Positive, suggests immunologic exposure.   07/31/2012 433.00 U/mL Final     Comment:     Positive, suggests immunologic exposure.     Toxoplasma Antibody IgG   Date Value Ref Range Status   07/31/2012 13.8 IU/mL Final     Comment:     Positive     No results found for: H1IGG, H2IGG, EBVCAM    Imaging:  Recent Results (from the past 48 hour(s))   XR Chest Port 1 View    Narrative    EXAMINATION:  XR CHEST PORT 1 VW 2/26/2019 9:52 AM.    COMPARISON: Chest radiograph 2/24/2019.    HISTORY:  GNR bacteremia and in sputum, eval for pna    FINDINGS: Single upright AP radiograph of the chest. Intact median  sternotomy wires. Trachea is midline. Cardiomediastinal silhouette and  pulmonary vasculature are within normal limits. No pleural effusion or  pneumothorax. Bibasilar and retrocardiac pulmonary opacification.      Impression    IMPRESSION: Bibasilar and retrocardiac pulmonary consolidation.    I have personally reviewed the examination and initial interpretation  and I agree with the findings.    SHAMEKA VILLEGAS MD

## 2019-02-27 NOTE — PLAN OF CARE
Pt A&Ox4. Up ad aviva in room. Denies pain. Scheduled antibiotics given. BG at 0200 was 95. Orange juice given. Will continue to monitor and follow POC.

## 2019-02-27 NOTE — PROGRESS NOTES
[facility]    Pulmonary, Allergy, Critical Care and Sleep Medicine    Follow-up Note  02/27/2019    Assessment and Recommendations:    Aubrey Duncan is a 63 year old male with a history of bilateral lung transplant secondary to alpha 1 antitrypsin deficiency on 9/8/13, DMII, HIT, DVT, and HTN who presented on 2/24/2019 with fevers, weakness and cough from OSH, found to have GNR bacteremia after recent influenza exposure.      1. Pseudomonas bacteremia and pneumonia, influenza A:   -continue Tamiflu 75 mg BID for 5 days  -zosyn 4.5 mg IV q6h, would treat for 2 week course, would ask lab to expand sensitivities to see options for treatment at discharge. May also be able to transfer to a more local hospital for antibiotic treatment if no outpatient option can be found  -would have care coordinator look into coverage for home infusion   -follow repeat blood cultures      2. Bilateral lung transplant for A1AT in 2013:   -Holding azathioprine due to leukopenia  -home prednisone 5 mg qAM, 2.5 qPM  -Tacrolimus, 2.5 mg BID (goal 10-12), decreased on 2/26  -recheck next level on 3/1 (ordered)   -Bactrim for PJP ppx  -valcyte     Seen and discussed with Dr Belinda Emanuel MD  Pulmonary and Critical Care Fellow   Pager [phone]     Subjective, Interval history:   Feels okay. Not short of breath. Still having a bit of tightness in his upper chest with congestion. Starting overnight, had increased sputum clearance, coughing up more thick yellow sputum this morning. No hemoptysis. No fevers, chills. No chest pain. Normal appetite. No nausea or vomiting.  Objective:   Medications:    atovaquone  1,500 mg Oral Daily     azaTHIOprine  25 mg Oral Daily     calcium carbonate 600 mg-vitamin D 400 units  1 tablet Oral BID w/meals     ferrous sulfate  325 mg Oral Daily with breakfast     furosemide  20 mg Oral Daily     insulin aspart  1-10 Units Subcutaneous TID AC     insulin aspart  1-7 Units  Subcutaneous At Bedtime     insulin aspart  10 Units Subcutaneous QAM AC     insulin aspart  10 Units Subcutaneous Daily with lunch     insulin aspart  10 Units Subcutaneous Daily with supper     insulin glargine  25 Units Subcutaneous QAM AC     lisinopril  5 mg Oral Daily     metoprolol tartrate  25 mg Oral BID     omeprazole  20 mg Oral BID AC     oseltamivir  75 mg Oral BID     piperacillin-tazobactam  4.5 g Intravenous Q6H     predniSONE  2.5 mg Oral At Bedtime     predniSONE  5 mg Oral Daily     sodium chloride (PF)  3 mL Intracatheter Q8H     tacrolimus  2.5 mg Oral BID IS     valGANciclovir  450 mg Oral BID     acetaminophen, albuterol, IV fluid REPLACEMENT ONLY, glucose **OR** dextrose **OR** glucagon, lidocaine 4%, lidocaine (buffered or not buffered), melatonin, naloxone, sodium chloride (PF)    Physical Exam:  Temp:  [96.3  F (35.7  C)-97.5  F (36.4  C)] 97  F (36.1  C)  Pulse:  [83-85] 83  Heart Rate:  [65-72] 72  Resp:  [16-20] 16  BP: (146-171)/(72-94) 161/84  SpO2:  [95 %-98 %] 98 %    Intake/Output Summary (Last 24 hours) at 2/27/2019 1111  Last data filed at 2/27/2019 0800  Gross per 24 hour   Intake 1100 ml   Output 2000 ml   Net -900 ml     General: sitting up in bed in NAD  HEENT: anicteric, moist mucosa  Chest: mild right-sided crackles, improved from yesterday. Left clear. Normal WOB on room air.   Cardiac: RRR no murmurs  Abdomen: Soft, flat, non tender, active BS  Extremities: No LE Edema  Neuro: A&Ox3, no focal deficits   Skin: no rash noted    Labs and imaging: All laboratory and imaging data personally reviewed.    Notable for:  K 4.1 Cr 1.10  Alk phos 659.   WBC 2.4 Hgb 11.1 plts 196

## 2019-02-27 NOTE — PROVIDER NOTIFICATION
Paged: Jerome Mascorro 3    Lab called with critical result: respiratory viral panel came back positive for influenza A and influenza A H1N1.

## 2019-02-27 NOTE — PLAN OF CARE
Up independently in room. Denies pain. Need sputum culture, pt aware. 's on Novolog ISS. Educated pt on importance of ambulating 4x's daily with pneumonia. Ambulated X1 in halls with mask. Cont to monitor and with poc.

## 2019-02-27 NOTE — PROVIDER NOTIFICATION
Paged: Jerome Mascorro 3    Pt states he takes imodium QID for chronic diarrhea, wondering if he can get this reordered.     MD put in order for imodium QID PRN.

## 2019-02-28 VITALS
HEIGHT: 67 IN | HEART RATE: 83 BPM | TEMPERATURE: 97.5 F | SYSTOLIC BLOOD PRESSURE: 145 MMHG | BODY MASS INDEX: 24.53 KG/M2 | DIASTOLIC BLOOD PRESSURE: 87 MMHG | OXYGEN SATURATION: 98 % | RESPIRATION RATE: 16 BRPM | WEIGHT: 156.3 LBS

## 2019-02-28 PROBLEM — J15.1 PNEUMONIA OF RIGHT LOWER LOBE DUE TO PSEUDOMONAS SPECIES (H): Status: ACTIVE | Noted: 2019-02-28

## 2019-02-28 LAB
GLUCOSE BLDC GLUCOMTR-MCNC: 103 MG/DL (ref 70–99)
GLUCOSE BLDC GLUCOMTR-MCNC: 166 MG/DL (ref 70–99)
GLUCOSE BLDC GLUCOMTR-MCNC: 220 MG/DL (ref 70–99)

## 2019-02-28 PROCEDURE — A9270 NON-COVERED ITEM OR SERVICE: HCPCS | Mod: GY | Performed by: INTERNAL MEDICINE

## 2019-02-28 PROCEDURE — 40000275 ZZH STATISTIC RCP TIME EA 10 MIN

## 2019-02-28 PROCEDURE — 99239 HOSP IP/OBS DSCHRG MGMT >30: CPT | Mod: GC | Performed by: INTERNAL MEDICINE

## 2019-02-28 PROCEDURE — 40000809 ZZH STATISTIC NO DOCUMENTATION TO SUPPORT CHARGE

## 2019-02-28 PROCEDURE — 25000131 ZZH RX MED GY IP 250 OP 636 PS 637: Mod: GY | Performed by: INTERNAL MEDICINE

## 2019-02-28 PROCEDURE — 25000132 ZZH RX MED GY IP 250 OP 250 PS 637: Mod: GY | Performed by: INTERNAL MEDICINE

## 2019-02-28 PROCEDURE — A9270 NON-COVERED ITEM OR SERVICE: HCPCS | Mod: GY

## 2019-02-28 PROCEDURE — 00000146 ZZHCL STATISTIC GLUCOSE BY METER IP

## 2019-02-28 PROCEDURE — 25000131 ZZH RX MED GY IP 250 OP 636 PS 637: Mod: GY | Performed by: STUDENT IN AN ORGANIZED HEALTH CARE EDUCATION/TRAINING PROGRAM

## 2019-02-28 PROCEDURE — 25000132 ZZH RX MED GY IP 250 OP 250 PS 637: Mod: GY

## 2019-02-28 PROCEDURE — 25000125 ZZHC RX 250: Performed by: INTERNAL MEDICINE

## 2019-02-28 PROCEDURE — 94799 UNLISTED PULMONARY SVC/PX: CPT

## 2019-02-28 RX ORDER — TACROLIMUS 0.5 MG/1
2.5 CAPSULE ORAL 2 TIMES DAILY
Qty: 300 CAPSULE | Refills: 0 | Status: SHIPPED | OUTPATIENT
Start: 2019-02-28 | End: 2019-03-12

## 2019-02-28 RX ORDER — TACROLIMUS 0.5 MG/1
2.5 CAPSULE ORAL 2 TIMES DAILY
Qty: 300 CAPSULE | Refills: 0 | Status: SHIPPED | OUTPATIENT
Start: 2019-02-28 | End: 2019-02-28

## 2019-02-28 RX ORDER — DEXAMETHASONE SODIUM PHOSPHATE 4 MG/ML
INJECTION, SOLUTION INTRAMUSCULAR; INTRAVENOUS
Qty: 1 EACH | Refills: 0 | Status: SHIPPED | OUTPATIENT
Start: 2019-02-28 | End: 2020-07-02

## 2019-02-28 RX ORDER — CIPROFLOXACIN 500 MG/1
500 TABLET, FILM COATED ORAL EVERY 12 HOURS
Qty: 19 TABLET | Refills: 0 | Status: SHIPPED | OUTPATIENT
Start: 2019-02-28 | End: 2019-06-04

## 2019-02-28 RX ORDER — GLUCOSAMINE HCL/CHONDROITIN SU 500-400 MG
CAPSULE ORAL
Qty: 200 EACH | Refills: 3 | Status: SHIPPED | OUTPATIENT
Start: 2019-02-28 | End: 2020-07-02

## 2019-02-28 RX ORDER — OSELTAMIVIR PHOSPHATE 75 MG/1
75 CAPSULE ORAL 2 TIMES DAILY
Qty: 3 CAPSULE | Refills: 0 | Status: SHIPPED | OUTPATIENT
Start: 2019-02-28 | End: 2019-03-07

## 2019-02-28 RX ORDER — ATOVAQUONE 750 MG/5ML
1500 SUSPENSION ORAL DAILY
Qty: 420 ML | Refills: 3 | Status: SHIPPED | OUTPATIENT
Start: 2019-03-01 | End: 2019-02-28

## 2019-02-28 RX ORDER — SULFAMETHOXAZOLE/TRIMETHOPRIM 800-160 MG
1 TABLET ORAL
Qty: 30 TABLET | Refills: 0 | Status: SHIPPED | OUTPATIENT
Start: 2019-02-28 | End: 2019-03-15

## 2019-02-28 RX ORDER — LANCETS
EACH MISCELLANEOUS
Qty: 100 EACH | Refills: 0 | Status: SHIPPED | OUTPATIENT
Start: 2019-02-28 | End: 2019-03-07

## 2019-02-28 RX ADMIN — OSELTAMIVIR PHOSPHATE 75 MG: 75 CAPSULE ORAL at 08:24

## 2019-02-28 RX ADMIN — ATOVAQUONE 1500 MG: 750 SUSPENSION ORAL at 08:23

## 2019-02-28 RX ADMIN — TACROLIMUS 2.5 MG: 1 CAPSULE ORAL at 08:23

## 2019-02-28 RX ADMIN — VALGANCICLOVIR HYDROCHLORIDE 450 MG: 450 TABLET ORAL at 08:23

## 2019-02-28 RX ADMIN — LOPERAMIDE HYDROCHLORIDE 2 MG: 2 CAPSULE ORAL at 08:43

## 2019-02-28 RX ADMIN — Medication 25 MG: at 08:23

## 2019-02-28 RX ADMIN — FERROUS SULFATE TAB 325 MG (65 MG ELEMENTAL FE) 325 MG: 325 (65 FE) TAB at 14:16

## 2019-02-28 RX ADMIN — LOPERAMIDE HYDROCHLORIDE 2 MG: 2 CAPSULE ORAL at 14:16

## 2019-02-28 RX ADMIN — OMEPRAZOLE 20 MG: 20 CAPSULE, DELAYED RELEASE ORAL at 15:46

## 2019-02-28 RX ADMIN — CIPROFLOXACIN HYDROCHLORIDE 500 MG: 500 TABLET, FILM COATED ORAL at 08:23

## 2019-02-28 RX ADMIN — METOPROLOL TARTRATE 25 MG: 25 TABLET ORAL at 08:23

## 2019-02-28 RX ADMIN — LISINOPRIL 5 MG: 5 TABLET ORAL at 08:23

## 2019-02-28 RX ADMIN — OMEPRAZOLE 20 MG: 20 CAPSULE, DELAYED RELEASE ORAL at 08:23

## 2019-02-28 RX ADMIN — Medication 1 TABLET: at 08:23

## 2019-02-28 RX ADMIN — PREDNISONE 5 MG: 5 TABLET ORAL at 08:23

## 2019-02-28 RX ADMIN — FUROSEMIDE 20 MG: 20 TABLET ORAL at 08:23

## 2019-02-28 RX ADMIN — LOPERAMIDE HYDROCHLORIDE 2 MG: 2 CAPSULE ORAL at 01:47

## 2019-02-28 ASSESSMENT — ACTIVITIES OF DAILY LIVING (ADL)
ADLS_ACUITY_SCORE: 11

## 2019-02-28 NOTE — PLAN OF CARE
Pt A&Ox4. Up ad aviva in room. Denies pain. BG at 0200 was 220. MD notified and 1U of insulin aspart given. Discharge once able to get a ride back home. Will continue to monitor and follow POC.

## 2019-02-28 NOTE — PLAN OF CARE
Pt a&o x 4. VSS on room air. Pt status deemed adequate for discharge. Pt will be driven home by daughter. Reviewed AVS and discharge instructions, pt and daughter do not have any questions at this time. Pt will  medications and supplies from discharge pharmacy on their way out.     Pt left unit at 16:45

## 2019-02-28 NOTE — CONSULTS
Diabetes Education  Received consult request to see this 63 year old male for education on insulin administration.  Patient with history of lung transplant 9/2013, admitted with concern for sepsis secondary to a respiratory source.    Patient has a history of type 2 diabetes, previously diet controlled.  On admission, A1c result was 12.4%.  Patient started on insulin therapy.  Current doses are:  Insulin glargine (Lantus) 25 units daily  Insulin aspart (NovoLog) 7 units with meal  High correction scale.    Met with patient.  He states he briefly administered insulin following his transplant, then his provided discontinued the insulin.  He recalls using NovoLog, unsure about Lantus.    He does recall insulin pen technique; able to teach back appropriate use.    Reviewed above dosing with him; he stated understanding.    Patient does have a Del Contour Next Ez monitor kit at home, but strips are outdated.   Will need new prescription for these at discharge.    When discharged, will need prescriptions for:  1.  Del Contour Next test strips  2.  Microlet lancets  3.  Insulin pen needles (4mm, 32 gauge)  4.  Alcohol swabs  5.  Sharps container    Vanda Ortega MS, APRN, CNS, CDE, CDTC  [phone]

## 2019-02-28 NOTE — DISCHARGE SUMMARY
[facility], [place]  Discharge Summary - Medicine & Pediatrics            Date of Admission:  2/24/2019  Date of Discharge:  2/28/2019  4:44 PM  Discharging Provider: Dr. Dimitri Solomon M.D.  Discharge Service: [facility] 3    Discharge Diagnoses   1. Sepsis 2/2 Pseudomonas pneumonia and bacteremia   2. Influenza A  3. Type II Diabetes Mellitus   4. Bilateral Lung Transplant for A1AT Deficiency  5. Elevated Alk Phos  6. SHELLEY    Follow-ups Needed After Discharge   - Follow up with Pulmonary clinic on 3/14/19   - Follow up with PCP to further refine insulin regimen, follow-up elevated ALK-PHOS    Unresulted Labs Ordered in the Past 30 Days of this Admission     Date and Time Order Name Status Description    2/25/2019 1715 Blood culture Preliminary     2/24/2019 1036 Blood culture Preliminary     2/24/2019 1036 Blood culture Preliminary         Discharge Disposition   Discharged to home  Condition at discharge: Stable    Hospital Course   Aubrey Duncan is a 64 yo male with a history of bilateral lung transplant 2/2 A1AT deficiency in 2013, T2DM, HIT, DVT, and hypertension who presented with weakness, fevers, mild cough in setting of recent influenzae exposure and was found to have Pseudomonas pneumonia and bacteremia.     The following problems were addressed during his hospitalization:    # Pseudomonas pneumonia and bacteremia   On admission, the patient met criteria for sepsis with fever, WBC <2.0, and hypotension. Lactic Acid was 1.7 and CRP was 7.9. CXR on day of admission did not show evidence of consolidation but exam findings concerning for pulmonary source in setting of recent influenza infection. The patient was started on broad coverage antibiotics with azithromycin, zosyn, and vancomycin. CXR on 2/26 showed evidence of pulmonary consolidation and cultures from OSH returned growing Pseudomonas. Antibiotic coverage was subsequently narrowed to IV Zosyn. The patient was seen by  Transplant ID and transplant pulmonary who suggested transitioning to ciprofloxacin for 14 day total course of antibiotics.   - PO Ciprofloxacin 500 mg Q12H     # Influenza A   Patient completed a 5 day course of Tamiflu prior to admission; we continued this in the hospital given his transplant status and will plan for 10 day total course.   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 The glucose stabilized over the next two days on the insulin drip and the patient was switched to subcutaneous insulin. Once converting back to his home dose steroids, we finalized his insulin dose at 25 units glargine ; 7 units TID Aspart w/meals; sliding scale) with satisfatory blood glucose control at discharge.   - 25 units glargine; 7 units TID Aspart w/meals   - Follow up with PCP for further titration of insulin regimen     # Hx of Bilateral Lung Transplant for A1AT deficiency  CMV: d+/r- EBV: d+/r+  Patient was initially was on stress dose steroids given his hypotension on admission. He was transitioned back to PTA Prednisone without any complications. Given his chronic leukopenia, the patient was transitioned from Bactrim to Atovaquone for PCP prophylaxis. Unfortunately he could not afford the cost of Atovaquone so was ultimately sent home on Bactrim. The patient's tacrolimus level was elevated;  transplant pulmonology recommended reducing his dose to 2.5 mg twice daily.   - Continue PTA azathioprine 25 mg daily  - Continue PTA Prednisone (2.5 mg PO at Bedtime; 5 mg PO QDay)  - Continue PTA Valcyte  - Continue PTA Bactrim DS three times weekly for PCP prophylaxis; transplant pulm may consider transitioning to pentamidine.   - Tacrolimus 2.5 mg twice daily; Pulmonary team will follow up with levels and have scheduled outpatient follow up for 3/14/19     # Elevated Alk Phos (659 2/27/19)  Other LFTs are  WNL. The patient was afebrile and had a benign abdominal exam throughout his hospital course.    - Follow up with primary care provider as an outpatient      # SHELLEY, resolved   Creatinine on arrival was 1.42 (2/24) and improved daily to 1.10 by last check on 2/27/19. Like secondary to a prerenal/hypotension etiology. Patient was initially held on his PTA Lisinopril and Lasix but restarted after few days with improving creatinine.   - Continue PTA Lisinopril and Lasix     Chronic:  HTN: Restarted PTA Lisinopril and Lasix   Diarrhea: Held PTA loperamide   GERD: Continue PTA omeprazole      Consultations This Hospital Stay   PHARMACY TO DOSE VANCO  PHARMACY IP CONSULT  VASCULAR ACCESS CARE ADULT IP CONSULT  VASCULAR ACCESS CARE ADULT IP CONSULT  PULMONARY CF/TRANSPLANT ADULT IP CONSULT  INFECTIOUS DISEASE GENERAL ADULT IP CONSULT  INFECTIOUS DISEASE TRANSPLANT SOT ADULT IP CONSULT  DIABETES EDUCATION IP CONSULT    Code Status   DNR/DNI     The patient was discussed with EDUARDO Florentino [address] [place] [address]  Pager: [phone]    Jerome Sharma  PGY-3 [id number]  ______________________________________________________________________    Physical Exam   Vital Signs: Temp: 96.7  F (35.9  C) Temp src: Oral BP: 179/88   Heart Rate: 63 Resp: 16 SpO2: 98 % O2 Device: None (Room air)    Weight: 156 lbs 4.8 oz    General: Awake, alert, and cooperative. Sitting up in bed.   HEENT: Head atraumatic. Pupils equal and reactive to light. Mucus membranes dry. No oropharyngeal exudate and erythema.   Resp: Appears in no acute distress. Slightly coarse breath sounds bilaterally. No stridor   CV: Regular rate and rhythm. No murmurs. DP, PT, and radial pulses palpable bilaterally.   Abd: Soft, non-tender. Normal bowel sounds. No rebound, guarding, or rigidity.   MS: No focal deformities. No edema bilaterally.   Neuro: No focal deficits.   Skin: Warm and Dry.   Psych: Normal  affect       Primary Care Physician   David Jiang    Immunizations       Discharge Orders   No discharge procedures on file.    Significant Results and Procedures   Most Recent 3 CBC's:  Recent Labs   Lab Test 02/27/19  0544 02/26/19  0630 02/25/19  0531   WBC 2.4* 2.0* 2.1*   HGB 11.1* 10.9* 11.0*   * 102* 105*    154 134*     Most Recent 3 BMP's:  Recent Labs   Lab Test 02/27/19  0544 02/26/19  0630 02/25/19  0531    140 136   POTASSIUM 4.1 4.4 5.6*   CHLORIDE 112* 108 104   CO2 21 21 24   BUN 28 30 26   CR 1.10 1.11 1.13   ANIONGAP 9 11 8   ERIN 8.1* 8.1* 8.3*   GLC 95 137* 154*     Most Recent 2 LFT's:  Recent Labs   Lab Test 02/27/19  0544 02/26/19  0630   AST 33 28   ALT 28 27   ALKPHOS 659* 651*   BILITOTAL 0.4 0.4     Most Recent 3 Creatinines:  Recent Labs   Lab Test 02/27/19  0544 02/26/19  0630 02/25/19  0531   CR 1.10 1.11 1.13     Most Recent Hemoglobin A1c:  Recent Labs   Lab Test 02/24/19  1039   A1C 12.4*   ,   Results for orders placed or performed during the hospital encounter of 02/24/19   XR Chest Port 1 View    Narrative    EXAMINATION:  XR CHEST PORT 1 VW 2/26/2019 9:52 AM.    COMPARISON: Chest radiograph 2/24/2019.    HISTORY:  GNR bacteremia and in sputum, eval for pna    FINDINGS: Single upright AP radiograph of the chest. Intact median  sternotomy wires. Trachea is midline. Cardiomediastinal silhouette and  pulmonary vasculature are within normal limits. No pleural effusion or  pneumothorax. Bibasilar and retrocardiac pulmonary opacification.      Impression    IMPRESSION: Bibasilar and retrocardiac pulmonary consolidation.    I have personally reviewed the examination and initial interpretation  and I agree with the findings.    SHAMEKA VILLEGAS MD     *Note: Due to a large number of results and/or encounters for the requested time period, some results have not been displayed. A complete set of results can be found in Results Review.       Discharge Medications    Current Discharge Medication List      CONTINUE these medications which have NOT CHANGED    Details   albuterol (PROAIR HFA, PROVENTIL HFA, VENTOLIN HFA) 108 (90 BASE) MCG/ACT inhaler Inhale 2 puffs into the lungs every 6 hours as needed for shortness of breath / dyspnea or wheezing  Qty: 3 Inhaler, Refills: 11    Associated Diagnoses: Wheezing      azaTHIOprine (IMURAN) 50 MG tablet Take 0.5 tablets (25 mg) by mouth daily  Qty: 15 tablet, Refills: 11    Associated Diagnoses: Lung replaced by transplant (H)      calcium-vitamin D (CALTRATE) 600-400 MG-UNIT per tablet Take 1 tablet by mouth 2 times daily (with meals)  Qty: 60 tablet, Refills: 12    Associated Diagnoses: Lung transplant status, bilateral (H)      ferrous sulfate (FEROSUL) 325 (65 Fe) MG tablet Take 325 mg by mouth daily (with breakfast)      fluorouracil (EFUDEX) 5 % cream Apply topically daily Use once per day for 10 days on areas of scalp, forehead and face that are scaled and gritty (one month on the central forehead). Avoid eyes.  Qty: 40 g, Refills: 1    Associated Diagnoses: Keratosis, actinic      furosemide (LASIX) 20 MG tablet Take 1 tablet (20 mg) by mouth daily  Qty: 90 tablet, Refills: 4    Associated Diagnoses: Essential hypertension      lisinopril (PRINIVIL/ZESTRIL) 5 MG tablet Take 1 tablet (5 mg) by mouth daily  Qty: 90 tablet, Refills: 4    Associated Diagnoses: Essential hypertension      loperamide (IMODIUM) 2 MG capsule Take 1 capsule (2 mg) by mouth 4 times daily as needed for diarrhea  Qty: 120 capsule, Refills: 12    Associated Diagnoses: Lung transplant status, bilateral (H)      metoprolol tartrate (LOPRESSOR) 25 MG tablet Take 1 tablet (25 mg) by mouth 2 times daily  Qty: 180 tablet, Refills: 4    Associated Diagnoses: Essential hypertension      multivitamin, therapeutic (THERA-VIT) TABS Take 1 tablet by mouth daily  Qty: 30 tablet, Refills: 12    Associated Diagnoses: Lung transplant status, bilateral (H)      omeprazole  "(PRILOSEC) 20 MG CR capsule Take 1 capsule (20 mg) by mouth 2 times daily (before meals)  Qty: 180 capsule, Refills: 4    Associated Diagnoses: Lung transplant status, bilateral (H)      oseltamivir (TAMIFLU) 75 MG capsule Take 1 capsule (75 mg) by mouth 2 times daily  Qty: 10 capsule, Refills: 0    Associated Diagnoses: Influenza-like illness      predniSONE (DELTASONE) 5 MG tablet TAKE ONE TABLET BY MOUTH EVERY MORNING AND TAKE ONE-HALF TABLET BY MOUTH EVERY EVENING  Qty: 45 tablet, Refills: 12    Associated Diagnoses: Lung transplant status, bilateral (H)      sildenafil (VIAGRA) 25 MG tablet Take 1 tablet (25 mg) by mouth as needed  Qty: 30 tablet, Refills: 0    Associated Diagnoses: ED (erectile dysfunction)      sulfamethoxazole-trimethoprim (BACTRIM/SEPTRA) 400-80 MG per tablet TAKE ONE TABLET BY MOUTH THREE TIMES A WEEK  Qty: 12 tablet, Refills: 11    Associated Diagnoses: Lung replaced by transplant (H)      tacrolimus (GENERIC EQUIVALENT) 1 MG capsule Take 3 caps every am and 3 caps every pm.  Qty: 180 capsule, Refills: 12    Associated Diagnoses: Lung transplant status, bilateral (H)      valGANciclovir (VALCYTE) 450 MG tablet TAKE ONE TABLETS (450MG) BY MOUTH TWO TIMES A DAY  Qty: 60 tablet, Refills: 11    Associated Diagnoses: CMV (cytomegalovirus infection) (H); Lung replaced by transplant (H)      MYFORTIC (BRAND) 180 MG EC tablet Take 180 mg by mouth 2 times daily           Allergies   Allergies   Allergen Reactions     Heparin Cross Reactors Other (See Comments)     HIT positive and AUGUST positive      Oxycodone Other (See Comments)     Significant lethargy, confusion. Tolerates dilaudid well.      Fluocinolone Unknown and Other (See Comments)     Tendon problems  Tendon problems     Levaquin      Pneumococcal Vaccine Other (See Comments)     Other reaction(s): Fever  \"My arm swelled up like a balloon.\"     "

## 2019-03-01 ENCOUNTER — PATIENT OUTREACH (OUTPATIENT)
Dept: CARE COORDINATION | Facility: CLINIC | Age: 64
End: 2019-03-01

## 2019-03-01 LAB
BACTERIA SPEC CULT: ABNORMAL
BACTERIA SPEC CULT: ABNORMAL
BACTERIA SPEC CULT: NORMAL
SPECIMEN SOURCE: ABNORMAL
SPECIMEN SOURCE: NORMAL

## 2019-03-01 NOTE — PLAN OF CARE
[id number].  AVSS, /87. O2 sats upper 90s on RA. Denies pain. Appetite good. Urine output good. Chronic loose BMs, imodium x2.  Patient ready for discharge, waiting for attending to ok and sign discharge orders. Oncoming nurse will go over discharge paperwork and discharge patient. Daughter at bedside, will drive patient home.

## 2019-03-03 LAB
BACTERIA SPEC CULT: NO GROWTH
Lab: NORMAL
SPECIMEN SOURCE: NORMAL

## 2019-03-07 ENCOUNTER — OFFICE VISIT (OUTPATIENT)
Dept: PEDIATRICS | Facility: OTHER | Age: 64
End: 2019-03-07
Attending: INTERNAL MEDICINE
Payer: MEDICARE

## 2019-03-07 VITALS
SYSTOLIC BLOOD PRESSURE: 114 MMHG | RESPIRATION RATE: 16 BRPM | TEMPERATURE: 98.2 F | DIASTOLIC BLOOD PRESSURE: 76 MMHG | HEIGHT: 67 IN | HEART RATE: 80 BPM | OXYGEN SATURATION: 97 % | WEIGHT: 157 LBS | BODY MASS INDEX: 24.64 KG/M2

## 2019-03-07 DIAGNOSIS — E09.9 STEROID-INDUCED DIABETES MELLITUS (H): ICD-10-CM

## 2019-03-07 DIAGNOSIS — T38.0X5A STEROID-INDUCED DIABETES MELLITUS (H): ICD-10-CM

## 2019-03-07 DIAGNOSIS — Z09 HOSPITAL DISCHARGE FOLLOW-UP: Primary | ICD-10-CM

## 2019-03-07 DIAGNOSIS — J10.1 INFLUENZA A: ICD-10-CM

## 2019-03-07 DIAGNOSIS — E11.9 DIABETES MELLITUS TYPE 2, DIET-CONTROLLED (H): ICD-10-CM

## 2019-03-07 DIAGNOSIS — Z94.2 S/P LUNG TRANSPLANT (H): Chronic | ICD-10-CM

## 2019-03-07 DIAGNOSIS — E88.01 ALPHA-1-ANTITRYPSIN DEFICIENCY (H): Chronic | ICD-10-CM

## 2019-03-07 DIAGNOSIS — J15.1 PNEUMONIA DUE TO PSEUDOMONAS SPECIES, UNSPECIFIED LATERALITY, UNSPECIFIED PART OF LUNG (H): ICD-10-CM

## 2019-03-07 PROCEDURE — G0463 HOSPITAL OUTPT CLINIC VISIT: HCPCS

## 2019-03-07 PROCEDURE — 99214 OFFICE O/P EST MOD 30 MIN: CPT | Performed by: INTERNAL MEDICINE

## 2019-03-07 RX ORDER — LANCETS
EACH MISCELLANEOUS
Qty: 100 EACH | Refills: 11 | Status: SHIPPED | OUTPATIENT
Start: 2019-03-07 | End: 2019-03-22

## 2019-03-07 ASSESSMENT — ANXIETY QUESTIONNAIRES
3. WORRYING TOO MUCH ABOUT DIFFERENT THINGS: NOT AT ALL
IF YOU CHECKED OFF ANY PROBLEMS ON THIS QUESTIONNAIRE, HOW DIFFICULT HAVE THESE PROBLEMS MADE IT FOR YOU TO DO YOUR WORK, TAKE CARE OF THINGS AT HOME, OR GET ALONG WITH OTHER PEOPLE: NOT DIFFICULT AT ALL
7. FEELING AFRAID AS IF SOMETHING AWFUL MIGHT HAPPEN: NOT AT ALL
5. BEING SO RESTLESS THAT IT IS HARD TO SIT STILL: NOT AT ALL
2. NOT BEING ABLE TO STOP OR CONTROL WORRYING: NOT AT ALL
GAD7 TOTAL SCORE: 0
6. BECOMING EASILY ANNOYED OR IRRITABLE: NOT AT ALL
1. FEELING NERVOUS, ANXIOUS, OR ON EDGE: NOT AT ALL

## 2019-03-07 ASSESSMENT — MIFFLIN-ST. JEOR: SCORE: 1455.78

## 2019-03-07 ASSESSMENT — PATIENT HEALTH QUESTIONNAIRE - PHQ9
5. POOR APPETITE OR OVEREATING: NOT AT ALL
SUM OF ALL RESPONSES TO PHQ QUESTIONS 1-9: 0

## 2019-03-07 ASSESSMENT — PAIN SCALES - GENERAL: PAINLEVEL: NO PAIN (0)

## 2019-03-07 NOTE — PATIENT INSTRUCTIONS
-- Reduce Lantus from 25 to 23 units   -- No change to Novolog   -- Check sugars before meals, and 2 hours after supper     -- Complete antibiotics  -- Eat yogurt 1-2 times per day while on antibiotics (and for a few weeks after) to reduce the chances of diarrhea     -- Follow-up in 1 month

## 2019-03-07 NOTE — NURSING NOTE
Patient presents to clinic for hosp F/U from 2/24/19 for pneumonia.  Ale Eller LPN ....................  3/7/2019   10:09 AM    No LMP for male patient.  Medication Reconciliation: complete    Ale Eller LPN  3/7/2019 10:09 AM    Previous A1C is not at goal of <8  Lab Results   Component Value Date    A1C 12.4 02/24/2019    A1C 7.8 08/22/2018    A1C 6.5 10/06/2014    A1C 5.4 09/12/2013    A1C 5.3 09/10/2013     Urine microalbumin:creatine: DUE  Foot exam DUE  Eye exam 7/2018    Tobacco User No  Patient is not on a daily aspirin  Patient is not on a Statin.  Blood pressure today of:     BP Readings from Last 1 Encounters:   03/07/19 114/76      is at the goal of <139/89 for diabetics.    Ale Eller LPN on 3/7/2019 at 10:10 AM

## 2019-03-07 NOTE — PROGRESS NOTES
"Subjective  Aubrey Duncan is a 63 year old male who presents for Hospital Discharge Follow-up.  [facility] was diagnosed with influenza A.  Unfortunately he had a concomitant bacterial pneumonia, Pseudomonas pneumonia.  He never required intubation and slowly improved throughout his hospital stay.  He feels like he is continuing to make a slow improvement.  Still coughing some.  He is been working on his glucose control which was poor prior.  Currently taking Lantus 25 units nightly, NovoLog 7 units 3 times daily AC.  Currently blood sugars are 130 or less, had 2 blood sugars under 70 (57, 68).  Has not had any over 200 really.  He wants to know what he can do to eat healthier.  \"All they say is eat fewer carbs.\"    Allergies: reviewed in EMR  Medications: reviewed in EMR  Problem List/PMH: reviewed in EMR    Social Hx:  Social History     Tobacco Use     Smoking status: Former Smoker     Packs/day: 2.00     Years: 15.00     Pack years: 30.00     Types: Cigarettes     Last attempt to quit: 1986     Years since quittin.2     Smokeless tobacco: Never Used   Substance Use Topics     Alcohol use: No     Alcohol/week: 0.0 oz     Drug use: No     Social History     Social History Narrative     Not on file     I reviewed social history and made relevant updates today.    Family Hx:   Family History   Problem Relation Age of Onset     Heart Failure Mother          with CHF at age 95     Asthma Mother      C.A.D. Mother      Asthma Sister      Diabetes Sister      Hypertension Sister      Hypertension Daughter      Other - See Comments Sister         bleeding disorder     Other - See Comments Daughter         fibromyalgia     Cerebrovascular Disease Father          at age 83 with ministrokes; had arthritis as a farmer     Skin Cancer No family hx of      Melanoma No family hx of        Objective  Vitals: reviewed in EMR.  /76 (BP Location: " "Right arm, Patient Position: Sitting, Cuff Size: Adult Large)   Pulse 80   Temp 98.2  F (36.8  C) (Tympanic)   Resp 16   Ht 1.702 m (5' 7\")   Wt 71.2 kg (157 lb)   SpO2 97%   BMI 24.59 kg/m      Gen: Pleasant male, NAD.  HEENT: MMM, no OP erythema.   Neck: Supple,no JVD, no bruits.  CV: RRR no m/r/g.   Pulm: CTAB no w/r/r  Neuro: Grossly intact  Msk: No lower extremity edema.  Skin: No concerning lesions.  Psychiatric: Normal affect and insight. Does not appear anxious or depressed.    Labs:  Glucose   Date Value Ref Range Status   02/27/2019 95 70 - 99 mg/dL Final   02/26/2019 137 (H) 70 - 99 mg/dL Final     Hemoglobin A1C   Date Value Ref Range Status   02/24/2019 12.4 (H) 4.0 - 6.0 % Final   08/22/2018 7.8 (H) 0 - 5.6 % Final     Comment:     Normal <5.7% Prediabetes 5.7-6.4%  Diabetes 6.5% or higher - adopted from ADA   consensus guidelines.       Creatinine   Date Value Ref Range Status   02/27/2019 1.10 0.66 - 1.25 mg/dL Final   02/26/2019 1.11 0.66 - 1.25 mg/dL Final     LDL Cholesterol Calculated   Date Value Ref Range Status   08/22/2018 80 <100 mg/dL Final     Comment:     Desirable:       <100 mg/dl             Assessment    ICD-10-CM    1. Hospital discharge follow-up Z09    2. Diabetes mellitus type 2, diet-controlled (H) E11.9 [place] [facility] [place]FERR[place]   3. 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 Influenza A J10.1    5. Pneumonia due to Pseudomonas species, unspecified laterality, unspecified part of lung (H) J15.1    6. S/P lung transplant (H) Z94.2    7. Alpha-1-antitrypsin deficiency (H) E88.01        Mr. Duncan was recently hospitalized for influenza and pneumonia, appears to be doing well since discharge.  Discharge summary and recommendations for outpatient provider are reviewed. Based on what occurred in the visit " today:  Previous medication(s) were discontinued or altered? Yes  Previous medication(s) were suspended pending consultation? No  New medication(s) started? No      Plan   -- Expected clinical course discussed   -- Medications and their side effects discussed    Patient Instructions    -- Reduce Lantus from 25 to 23 units   -- No change to Novolog   -- Check sugars before meals, and 2 hours after supper     -- Complete antibiotics  -- Eat yogurt 1-2 times per day while on antibiotics (and for a few weeks after) to reduce the chances of diarrhea     -- Follow-up in 1 month              Signed, David Jiang MD  Internal Medicine & Pediatrics

## 2019-03-08 ASSESSMENT — ANXIETY QUESTIONNAIRES: GAD7 TOTAL SCORE: 0

## 2019-03-12 ENCOUNTER — TELEPHONE (OUTPATIENT)
Dept: TRANSPLANT | Facility: CLINIC | Age: 64
End: 2019-03-12

## 2019-03-12 DIAGNOSIS — Z94.2 S/P LUNG TRANSPLANT (H): Chronic | ICD-10-CM

## 2019-03-12 RX ORDER — TACROLIMUS 0.5 MG/1
2.5 CAPSULE ORAL 2 TIMES DAILY
Qty: 300 CAPSULE | Refills: 11 | Status: SHIPPED | OUTPATIENT
Start: 2019-03-12 | End: 2019-03-15

## 2019-03-12 RX ORDER — SULFAMETHOXAZOLE/TRIMETHOPRIM 800-160 MG
1 TABLET ORAL
Qty: 30 TABLET | Refills: 0 | Status: CANCELLED | OUTPATIENT
Start: 2019-03-12

## 2019-03-12 NOTE — TELEPHONE ENCOUNTER
Please send new rx's for tacrolimus 1g and 0.5mg, pt states current dose is 2.5mg BID    Thank you!  [facility]
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
## 2019-03-14 ENCOUNTER — RESULTS ONLY (OUTPATIENT)
Dept: OTHER | Facility: CLINIC | Age: 64
End: 2019-03-14

## 2019-03-14 ENCOUNTER — OFFICE VISIT (OUTPATIENT)
Dept: TRANSPLANT | Facility: CLINIC | Age: 64
End: 2019-03-14
Attending: INTERNAL MEDICINE
Payer: MEDICARE

## 2019-03-14 ENCOUNTER — ANCILLARY PROCEDURE (OUTPATIENT)
Dept: GENERAL RADIOLOGY | Facility: CLINIC | Age: 64
End: 2019-03-14
Attending: INTERNAL MEDICINE
Payer: MEDICARE

## 2019-03-14 VITALS
HEART RATE: 97 BPM | DIASTOLIC BLOOD PRESSURE: 75 MMHG | BODY MASS INDEX: 24.8 KG/M2 | OXYGEN SATURATION: 96 % | WEIGHT: 158 LBS | HEIGHT: 67 IN | SYSTOLIC BLOOD PRESSURE: 120 MMHG

## 2019-03-14 DIAGNOSIS — Z23 ENCOUNTER FOR VACCINATION: ICD-10-CM

## 2019-03-14 DIAGNOSIS — Z94.2 LUNG REPLACED BY TRANSPLANT (H): ICD-10-CM

## 2019-03-14 DIAGNOSIS — Z94.2 S/P LUNG TRANSPLANT (H): Primary | Chronic | ICD-10-CM

## 2019-03-14 DIAGNOSIS — Z94.2 LUNG REPLACED BY TRANSPLANT (H): Primary | ICD-10-CM

## 2019-03-14 LAB
ANION GAP SERPL CALCULATED.3IONS-SCNC: 7 MMOL/L (ref 3–14)
BUN SERPL-MCNC: 36 MG/DL (ref 7–30)
CALCIUM SERPL-MCNC: 8.6 MG/DL (ref 8.5–10.1)
CHLORIDE SERPL-SCNC: 105 MMOL/L (ref 94–109)
CO2 SERPL-SCNC: 25 MMOL/L (ref 20–32)
CREAT SERPL-MCNC: 1.4 MG/DL (ref 0.66–1.25)
DLCOUNC-%PRED-PRE: 90 %
DLCOUNC-PRE: 22.87 ML/MIN/MMHG
DLCOUNC-PRED: 25.15 ML/MIN/MMHG
ERV-%PRED-PRE: 85 %
ERV-PRE: 1.02 L
ERV-PRED: 1.2 L
ERYTHROCYTE [DISTWIDTH] IN BLOOD BY AUTOMATED COUNT: 12.8 % (ref 10–15)
EXPTIME-PRE: 7.18 SEC
FEF2575-%PRED-PRE: 140 %
FEF2575-PRE: 3.61 L/SEC
FEF2575-PRED: 2.58 L/SEC
FEFMAX-%PRED-PRE: 117 %
FEFMAX-PRE: 9.82 L/SEC
FEFMAX-PRED: 8.38 L/SEC
FEV1-%PRED-PRE: 115 %
FEV1-PRE: 3.7 L
FEV1FEV6-PRE: 82 %
FEV1FEV6-PRED: 78 %
FEV1FVC-PRE: 82 %
FEV1FVC-PRED: 77 %
FEV1SVC-PRE: 81 %
FEV1SVC-PRED: 70 %
FIFMAX-PRE: 6.97 L/SEC
FVC-%PRED-PRE: 109 %
FVC-PRE: 4.53 L
FVC-PRED: 4.14 L
GFR SERPL CREATININE-BSD FRML MDRD: 52 ML/MIN/{1.73_M2}
GLUCOSE SERPL-MCNC: 105 MG/DL (ref 70–99)
HCT VFR BLD AUTO: 42 % (ref 40–53)
HGB BLD-MCNC: 13.1 G/DL (ref 13.3–17.7)
IC-%PRED-PRE: 105 %
IC-PRE: 3.51 L
IC-PRED: 3.33 L
MAGNESIUM SERPL-MCNC: 2.1 MG/DL (ref 1.6–2.3)
MCH RBC QN AUTO: 32.2 PG (ref 26.5–33)
MCHC RBC AUTO-ENTMCNC: 31.2 G/DL (ref 31.5–36.5)
MCV RBC AUTO: 103 FL (ref 78–100)
PLATELET # BLD AUTO: 220 10E9/L (ref 150–450)
POTASSIUM SERPL-SCNC: 4.8 MMOL/L (ref 3.4–5.3)
RBC # BLD AUTO: 4.07 10E12/L (ref 4.4–5.9)
SODIUM SERPL-SCNC: 138 MMOL/L (ref 133–144)
TACROLIMUS BLD-MCNC: 9.6 UG/L (ref 5–15)
TME LAST DOSE: NORMAL H
VA-%PRED-PRE: 94 %
VA-PRE: 5.7 L
VC-%PRED-PRE: 100 %
VC-PRE: 4.54 L
VC-PRED: 4.53 L
WBC # BLD AUTO: 3.3 10E9/L (ref 4–11)

## 2019-03-14 PROCEDURE — G0463 HOSPITAL OUTPT CLINIC VISIT: HCPCS | Mod: ZF

## 2019-03-14 PROCEDURE — 80048 BASIC METABOLIC PNL TOTAL CA: CPT | Performed by: INTERNAL MEDICINE

## 2019-03-14 PROCEDURE — 80197 ASSAY OF TACROLIMUS: CPT | Performed by: INTERNAL MEDICINE

## 2019-03-14 PROCEDURE — 86832 HLA CLASS I HIGH DEFIN QUAL: CPT | Performed by: INTERNAL MEDICINE

## 2019-03-14 PROCEDURE — 36415 COLL VENOUS BLD VENIPUNCTURE: CPT | Performed by: INTERNAL MEDICINE

## 2019-03-14 PROCEDURE — 85027 COMPLETE CBC AUTOMATED: CPT | Performed by: INTERNAL MEDICINE

## 2019-03-14 PROCEDURE — 83735 ASSAY OF MAGNESIUM: CPT | Performed by: INTERNAL MEDICINE

## 2019-03-14 PROCEDURE — 86833 HLA CLASS II HIGH DEFIN QUAL: CPT | Performed by: INTERNAL MEDICINE

## 2019-03-14 ASSESSMENT — PAIN SCALES - GENERAL: PAINLEVEL: NO PAIN (0)

## 2019-03-14 ASSESSMENT — MIFFLIN-ST. JEOR: SCORE: 1460.31

## 2019-03-14 NOTE — PATIENT INSTRUCTIONS
1. Your chest x-ray and PFT look great!  2. Coordinator will call you with today's tacro level and other results  3. Say hi to Kyung  4. See you in 6 months--call if you have problems after that.

## 2019-03-14 NOTE — RESULT ENCOUNTER NOTE
Saulo Burgos, your tacrolimus level was 9.6 at 12 hours on 3/14/19 which is within your goal range of 8-10 (new goal as of 3/14/19). No dose change at this time. Please call the transplant office ([phone]) with any questions. Thanks, Svitlana
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
## 2019-03-14 NOTE — NURSING NOTE
"Chief Complaint   Patient presents with     RECHECK     f/u post lung tx     /75   Pulse 97   Ht 1.702 m (5' 7\")   Wt 71.7 kg (158 lb)   SpO2 96%   BMI 24.75 kg/m    Nanda Barr CMA    "

## 2019-03-15 ENCOUNTER — TELEPHONE (OUTPATIENT)
Dept: TRANSPLANT | Facility: CLINIC | Age: 64
End: 2019-03-15

## 2019-03-15 DIAGNOSIS — Z94.2 S/P LUNG TRANSPLANT (H): Chronic | ICD-10-CM

## 2019-03-15 LAB
CMV DNA SPEC NAA+PROBE-ACNC: NORMAL [IU]/ML
CMV DNA SPEC NAA+PROBE-LOG#: NORMAL {LOG_IU}/ML
DONOR IDENTIFICATION: NORMAL
DSA COMMENTS: NORMAL
DSA PRESENT: NO
DSA TEST METHOD: NORMAL
ORGAN: NORMAL
SA1 CELL: NORMAL
SA1 COMMENTS: NORMAL
SA1 HI RISK ABY: NORMAL
SA1 MOD RISK ABY: NORMAL
SA1 TEST METHOD: NORMAL
SA2 CELL: NORMAL
SA2 COMMENTS: NORMAL
SA2 HI RISK ABY UA: NORMAL
SA2 MOD RISK ABY: NORMAL
SA2 TEST METHOD: NORMAL
SPECIMEN SOURCE: NORMAL
UNACCEPTABLE ANTIGEN: NORMAL
UNOS CPRA: 0

## 2019-03-15 RX ORDER — TACROLIMUS 1 MG/1
2 CAPSULE ORAL 2 TIMES DAILY
Qty: 120 CAPSULE | Refills: 11 | Status: SHIPPED | OUTPATIENT
Start: 2019-03-15 | End: 2019-10-25

## 2019-03-15 RX ORDER — SULFAMETHOXAZOLE/TRIMETHOPRIM 800-160 MG
1 TABLET ORAL
Qty: 30 TABLET | Refills: 11 | Status: SHIPPED | OUTPATIENT
Start: 2019-03-16 | End: 2019-05-02

## 2019-03-15 RX ORDER — TACROLIMUS 0.5 MG/1
0.5 CAPSULE ORAL 2 TIMES DAILY
Qty: 300 CAPSULE | Refills: 11 | Status: SHIPPED | OUTPATIENT
Start: 2019-03-15 | End: 2019-10-25

## 2019-03-15 NOTE — TELEPHONE ENCOUNTER
Pt would like separate rx's for tacro 1mg and tacro 0.5mg    Thank you!  Hetal August CPhT  [facility]

## 2019-03-21 ENCOUNTER — OFFICE VISIT (OUTPATIENT)
Dept: FAMILY MEDICINE | Facility: OTHER | Age: 64
End: 2019-03-21
Attending: DIETITIAN, REGISTERED
Payer: MEDICARE

## 2019-03-21 DIAGNOSIS — E09.9 STEROID-INDUCED DIABETES MELLITUS (H): ICD-10-CM

## 2019-03-21 DIAGNOSIS — T38.0X5A STEROID-INDUCED DIABETES MELLITUS (H): ICD-10-CM

## 2019-03-21 PROCEDURE — 97802 MEDICAL NUTRITION INDIV IN: CPT | Performed by: DIETITIAN, REGISTERED

## 2019-03-22 DIAGNOSIS — E11.9 DIABETES MELLITUS TYPE 2, DIET-CONTROLLED (H): ICD-10-CM

## 2019-03-22 NOTE — PROGRESS NOTES
"SUBJECTIVE:  Aubrey Duncan presents today for initial MNT visit related to Type 2 DM. On insulin, stage 4.      Diabetes has been recent onset.  Patient is being treated with insulin, SMBG and ,diet and exercise.  Patient has been taking insulin regularly.   Patient glucose self monitoring as follows: four times daily, once daily.    Fastin-110  Pre-lunch:   Dinner:     Patient brings significant records today including food records.  Review shows glucose increases with CHO intake but are 95% within goal ranges.  He has several (daily, in the PM) low glucoses weekly, treating with CHO. This is usually after lunch. Review shows small lunches per patient lifestyle and then he is active in the afternoon.    Eats 3 meals per day. Snacks in the evening.    Insulin:  Lantus 23-0-0-0  Novolog 7-7-7-0    Lab Results      Component                Value               Date                      A1C                      12.4                2019            Lab Results      Component                Value               Date                      HDL                      39                  2018            Lab Results      Component                Value               Date                      LDL                      80                  2018            Lab Results      Component                Value               Date                      GLC                      105                 2019            BP:  Data Unavailable  Smoking status:  Former Smoker  2.00 Packs/day  For 15.00 Years    Types:  Cigarettes    Quit date:  1986    Today we reviewed Consistent CHO diet for DM control.  Reviewed \"Rule of 15\" for hypoglycemia.  Demonstrated portion sizes and gave sample meal and snack ideas.  Patient and wife verbalized understanding.  We did review his glucose results & recs to decrease lunch insulin with Y onsite provider.  She did agree to reduce lunch insulin in effort to reduce " hypoglycemia.     New insulin schedule:  Novolog 7-3-7-0  Lantus 23-0-0-0    Patient to follow-up with PCP in one month for DM management.     Time spent: 60 min  KRISTIN K. KLINEFELTER, RD on 3/22/2019 at 1:27 PM                   ROS      Physical Exam

## 2019-03-25 NOTE — TELEPHONE ENCOUNTER
Routing refill request to provider for review/approval because:  Instructions updated as requested   Will route to David Jiang for review and consideration.  LOV: 3/7/19  Lenore Walsh RN on 3/25/2019 at 9:49 AM

## 2019-03-29 ENCOUNTER — OFFICE VISIT (OUTPATIENT)
Dept: FAMILY MEDICINE | Facility: OTHER | Age: 64
End: 2019-03-29
Attending: NURSE PRACTITIONER
Payer: MEDICARE

## 2019-03-29 VITALS
BODY MASS INDEX: 25.37 KG/M2 | TEMPERATURE: 97.7 F | RESPIRATION RATE: 12 BRPM | WEIGHT: 162 LBS | SYSTOLIC BLOOD PRESSURE: 138 MMHG | DIASTOLIC BLOOD PRESSURE: 78 MMHG | HEART RATE: 92 BPM

## 2019-03-29 DIAGNOSIS — S81.811A SKIN TEAR OF RIGHT LOWER LEG WITHOUT COMPLICATION, INITIAL ENCOUNTER: Primary | ICD-10-CM

## 2019-03-29 DIAGNOSIS — L03.115 CELLULITIS OF RIGHT LOWER EXTREMITY: ICD-10-CM

## 2019-03-29 PROCEDURE — G0463 HOSPITAL OUTPT CLINIC VISIT: HCPCS

## 2019-03-29 PROCEDURE — 99214 OFFICE O/P EST MOD 30 MIN: CPT | Performed by: NURSE PRACTITIONER

## 2019-03-29 RX ORDER — CIPROFLOXACIN 500 MG/1
500 TABLET, FILM COATED ORAL 2 TIMES DAILY
Qty: 20 TABLET | Refills: 0 | Status: SHIPPED | OUTPATIENT
Start: 2019-03-29 | End: 2019-05-02

## 2019-03-29 ASSESSMENT — ANXIETY QUESTIONNAIRES
GAD7 TOTAL SCORE: 0
2. NOT BEING ABLE TO STOP OR CONTROL WORRYING: NOT AT ALL
3. WORRYING TOO MUCH ABOUT DIFFERENT THINGS: NOT AT ALL
1. FEELING NERVOUS, ANXIOUS, OR ON EDGE: NOT AT ALL
7. FEELING AFRAID AS IF SOMETHING AWFUL MIGHT HAPPEN: NOT AT ALL
5. BEING SO RESTLESS THAT IT IS HARD TO SIT STILL: NOT AT ALL
6. BECOMING EASILY ANNOYED OR IRRITABLE: NOT AT ALL
IF YOU CHECKED OFF ANY PROBLEMS ON THIS QUESTIONNAIRE, HOW DIFFICULT HAVE THESE PROBLEMS MADE IT FOR YOU TO DO YOUR WORK, TAKE CARE OF THINGS AT HOME, OR GET ALONG WITH OTHER PEOPLE: NOT DIFFICULT AT ALL

## 2019-03-29 ASSESSMENT — PAIN SCALES - GENERAL: PAINLEVEL: NO PAIN (0)

## 2019-03-29 ASSESSMENT — PATIENT HEALTH QUESTIONNAIRE - PHQ9
5. POOR APPETITE OR OVEREATING: NOT AT ALL
SUM OF ALL RESPONSES TO PHQ QUESTIONS 1-9: 0
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
## 2019-03-29 NOTE — NURSING NOTE
"Patient presents to the clinic today for a right leg skin tear.  Cyn Shi LPN 3/29/2019   11:04 AM    Chief Complaint   Patient presents with     Derm Problem       Initial /78 (BP Location: Right arm, Patient Position: Sitting, Cuff Size: Adult Regular)   Pulse 92   Temp 97.7  F (36.5  C) (Tympanic)   Resp 12   Wt 73.5 kg (162 lb)   BMI 25.37 kg/m   Estimated body mass index is 25.37 kg/m  as calculated from the following:    Height as of 3/14/19: 1.702 m (5' 7\").    Weight as of this encounter: 73.5 kg (162 lb).  Medication Reconciliation: complete    Cyn Shi LPN    Previous A1C is not at goal of <8  Lab Results   Component Value Date    A1C 12.4 02/24/2019    A1C 7.8 08/22/2018    A1C 6.5 10/06/2014    A1C 5.4 09/12/2013    A1C 5.3 09/10/2013     Urine microalbumin:creatine: n/a  Foot exam Unknown  Eye exam Summer 2018    Tobacco User No  Patient is not on a daily aspirin  Patient is not on a Statin.  Blood pressure today of:     BP Readings from Last 1 Encounters:   03/29/19 138/78      is at the goal of <139/89 for diabetics.    Cyn Shi LPN on 3/29/2019 at 11:10 AM      "

## 2019-03-30 ASSESSMENT — ANXIETY QUESTIONNAIRES: GAD7 TOTAL SCORE: 0

## 2019-04-08 ENCOUNTER — TELEPHONE (OUTPATIENT)
Dept: PEDIATRICS | Facility: OTHER | Age: 64
End: 2019-04-08

## 2019-04-08 DIAGNOSIS — E09.9 STEROID-INDUCED DIABETES MELLITUS (H): ICD-10-CM

## 2019-04-08 DIAGNOSIS — T38.0X5A STEROID-INDUCED DIABETES MELLITUS (H): ICD-10-CM

## 2019-04-08 NOTE — TELEPHONE ENCOUNTER
Prescription approved per [facility] Refill Protocol.  Patient has follow-up scheduled 4-15-19 as requested at last visit on 3-21-19. Brooke Tim RN on 4/8/2019 at 2:58 PM

## 2019-04-08 NOTE — TELEPHONE ENCOUNTER
Pt needs refill of basaglar quickpen, didn't see on med list    Thank you!  Hetal August [facility]

## 2019-04-08 NOTE — TELEPHONE ENCOUNTER
Filled ERx request for Insulin Aspart dated 4-8-19 to [place] [facility]. Brooke Tim RN on 4/8/2019 at 3:08 PM

## 2019-04-09 ENCOUNTER — ALLIED HEALTH/NURSE VISIT (OUTPATIENT)
Dept: EDUCATION SERVICES | Facility: OTHER | Age: 64
End: 2019-04-09
Attending: INTERNAL MEDICINE
Payer: MEDICARE

## 2019-04-09 PROCEDURE — G0108 DIAB MANAGE TRN  PER INDIV: HCPCS

## 2019-04-09 NOTE — PATIENT INSTRUCTIONS
Diabetes Goals and Reminders    Your A1c test should be done every 3 months.  Your goal is less than 8%.   Your last result is:  Lab Results   Component Value Date    A1C 12.4 02/24/2019       Your LDL cholesterol test should be done at least once a year.  Take a statin, if prescribed by your doctor, based on age and risk factors.  Your last result is:  LDL Cholesterol Calculated   Date Value Ref Range Status   08/22/2018 80 <100 mg/dL Final     Comment:     Desirable:       <100 mg/dl       Have blood pressure and weight checked every three months.  Your blood pressure goal is 140/90 or less.  Your last blood pressure reading was:   BP Readings from Last 1 Encounters:   03/29/19 138/78       Test your blood sugar 4 times per day.  Your home blood glucose target ranges are:  Fasting or Before meals:   2 hours after a meal: Less than 200  Bedtime 100 - 140 mg/dL       Avoid all tobacco.  Follow your healthy diet and exercise plan.  See the eye doctor every year.  See the dentist every six months.  Have kidney function tested yearly.    Take all medicine as prescribed.  Please let me know if you are having symptoms you don t expect or if you wish to stop any medicine.    Follow Up Plan  Please call or visit Diabetes Education if blood sugars are consistently out of target.  Your future lab plan is:  HgA1c in May 2019.    If you need your cholesterol checked at your next appointment, you should fast 8 to 10 hours before your appointment.  Do not eat or drink anything besides water.  Drink plenty of water and take all your usual medicine.    SUMMARY FOR TODAY'S VISIT    1.  Continue testing blood sugar 4 time(s) daily, as directed.    2.  Continue carbohydrate counting, per Nutritionist guidelines.     3.  Recommend following Provider guidelines for low to moderate physical activity, such as walking, working up to a minimum of 30 minutes most days, as tolerated.     4.  Follow-up for continued diabetes education  in one month.     Ely Aldrich RN, BSN, CDE  4/9/2019 5:53 PM

## 2019-04-09 NOTE — PROGRESS NOTES
"Diabetes Self-Management Education & Support    Diabetes Education Self Management & Training    SUBJECTIVE/OBJECTIVE:  Presents for: Initial Assessment for new diagnosis  Accompanied by: Spouse  Diabetes education in the past 24mo: Yes  Focus of Visit: Diabetes Pathophysiology, Being Active, Taking Medication  Insulin administration technique taught using: Pen(Reviewed Insulin Plan.)  Insulin Type: Lantus, NovoLog  Diabetes type: Type 2  Disease course: Improving  Transportation concerns: No  Cultural Influences/Ethnic Background:  American      Diabetes Symptoms & Complications     Heart disease: Yes    Patient Problem List and Family Medical History reviewed for relevant medical history, current medical status, and diabetes risk factors.    Vitals:  There were no vitals taken for this visit.  Estimated body mass index is 25.37 kg/m  as calculated from the following:    Height as of 3/14/19: 1.702 m (5' 7\").    Weight as of 3/29/19: 73.5 kg (162 lb).   Last 3 BP:   BP Readings from Last 3 Encounters:   03/29/19 138/78   03/14/19 120/75   03/07/19 114/76       History   Smoking Status     Former Smoker     Packs/day: 2.00     Years: 15.00     Types: Cigarettes     Quit date: 1/1/1986   Smokeless Tobacco     Never Used       Labs:  Lab Results   Component Value Date    A1C 12.4 02/24/2019     Lab Results   Component Value Date     03/14/2019     Lab Results   Component Value Date    LDL 80 08/22/2018     HDL Cholesterol   Date Value Ref Range Status   08/22/2018 39 (L) >39 mg/dL Final   ]  GFR Estimate   Date Value Ref Range Status   03/14/2019 52 (L) >60 mL/min/[1.73_m2] Final     Comment:     Non  GFR Calc  Starting 12/18/2018, serum creatinine based estimated GFR (eGFR) will be   calculated using the Chronic Kidney Disease Epidemiology Collaboration   (CKD-EPI) equation.       GFR Estimate If Black   Date Value Ref Range Status   03/14/2019 60 (L) >60 mL/min/[1.73_m2] Final     Comment:     "  GFR Calc  Starting 12/18/2018, serum creatinine based estimated GFR (eGFR) will be   calculated using the Chronic Kidney Disease Epidemiology Collaboration   (CKD-EPI) equation.       Lab Results   Component Value Date    CR 1.40 03/14/2019     No results found for: MICROALBUMIN    Healthy Eating  Healthy Eating Assessed Today: No  Has patient met with a dietitian in the past?: Yes    Being Active  Exercise:: Currently not exercising(Patient shares recent history of walking 45 - 60 minutes or biking 30 minutes most days. Due to current health issues and winter weather, patient is not currently exercising.)  Barrier to exercise: Physical limitation    Monitoring  Monitoring Assessed Today: Yes  Did patient bring glucose meter to appointment? : Yes  Blood Glucose Meter: Other(Contour)  Home Glucose (Sugar) Monitoring: 3-4 times per day  Blood glucose trend: Fluctuating minimally  Low Glucose Range (mg/dL): <70  High Glucose Range (mg/dL): 130-140  Overall Range (mg/dL): (14d average 97 mg/dL with range 68 - 133 mg/dL.)    Patient BG improved with lowering Novolog at lunch.  BG readings 80s - 90s before meals and 2 1/2 hr after evening meal.  (Example:  preSupper BG 95 and PP/bedtime BG 88 mg/dL with FBG 85 mg/dL.)  Reviewed insulin action and effects of meal planning and physical activity.  Patient shares he always has a bed snack and will be sure to consume a snack if post prandial BG less than 140 mg/dL.     Taking Medications  Diabetes Medication(s)     Insulin       insulin aspart (NOVOLOG PEN) 100 UNIT/ML pen    Take 7U of insulin within 30 minutes of start of breakfast, lunch, and dinner. Do not give if blood sugar is less than 70 mg/dl.     insulin glargine (LANTUS SOLOSTAR PEN) 100 UNIT/ML pen    Inject 23 Units Subcutaneous every morning (before breakfast)          Taking Medication Assessed Today: Yes  Current Treatments: Diet, Insulin Injections  Dose schedule: pre-breakfast,  pre-lunch, pre-dinner  Given by: Patient, Significant other  Injection/Infusion sites: Abdomen, Buttocks  Problems taking diabetes medications regularly?: No  Diabetes medication side effects?: No  Treatment Compliance: All of the time    Problem Solving  Problem Solving Assessed Today: Yes  Hypoglycemia Frequency: Rarely  Hypoglycemia Treatment: Juice, Other food, Glucose (tablets or gel)  Patient carries a carbohydrate source: Yes      Reducing Risks  Diabetes Risks: Age over 45 years  CAD Risks: Hypertension, Male sex, Diabetes Mellitus    Healthy Coping  Emotional response to diabetes: Acceptance  Informal Support system:: Spouse  Stage of change: ACTION (Actively working towards change)  Difficulty affording diabetes management supplies?: Yes(Patient given information for PAP and Community Care Program.)  Support resources: Offerings in Clinic Communities  Patient Activation Measure Survey Score:  No flowsheet data found.    ASSESSMENT:  Patient newly diagnosed DM2.  Discussed pathophysiology with interpretation and meaning of HgA1c.  Stressed importance of testing BG daily to help keep tight control of DM2, helping to minimize risk for possible complications of uncontrolled diabetes.  Discussed benefits of keeping A1c under 8% and encouraged patient to begin testing BG daily.      Patient's most recent   Lab Results   Component Value Date    A1C 12.4 02/24/2019    is not meeting goal of <8.0    INTERVENTION:   Diabetes knowledge and skills assessment:     Patient is knowledgeable in diabetes management concepts related to: Healthy Eating, Being Active, Monitoring and Taking Medication    Patient needs further education on the following diabetes management concepts: Taking Medication, Problem Solving and Reducing Risks    Based on learning assessment above, most appropriate setting for further diabetes education would be: Group class or Individual setting.    Education provided today on:  AADE Self-Care  Behaviors:  Diabetes Pathophysiology  Being Active: relationship to blood glucose and precautions to take  Taking Medication: action of prescribed medication, drawing up, administering and storing injectable diabetes medications, proper site selection and rotation for injections and side effects of prescribed medications  Problem Solving: high blood glucose - causes, signs/symptoms, treatment and prevention, low blood glucose - causes, signs/symptoms, treatment and prevention, carrying a carbohydrate source at all times and medical identification  Reducing Risks: major complications of diabetes, prevention, early diagnostic measures and treatment of complications and A1C - goals, relating to blood glucose levels, how often to check    Opportunities for ongoing education and support in diabetes-self management were discussed.    Pt verbalized understanding of concepts discussed and recommendations provided today.       Education Materials Provided:  My Food Plan, Activity Pyramid, A1c flier, Tips on SMBG, Diabetes Success Plan, DSMS sheet and New T2DM folder with Diabetes Self-Management magazine.     PLAN:  See Patient Instructions for co-developed, patient-stated behavior change goals.  AVS printed and provided to patient today. See Follow-Up section for recommended follow-up.    Ely Aldrich RN, BSN, CDE  4/9/2019 5:50 PM   Time Spent: 60 minutes  Encounter Type: Individual    Any diabetes medication dose changes were made via the CDE Protocol and Collaborative Practice Agreement with the patient's primary care provider. A copy of this encounter was shared with the provider.

## 2019-04-15 ENCOUNTER — TELEPHONE (OUTPATIENT)
Dept: TRANSPLANT | Facility: CLINIC | Age: 64
End: 2019-04-15

## 2019-04-15 ENCOUNTER — TELEPHONE (OUTPATIENT)
Dept: PEDIATRICS | Facility: OTHER | Age: 64
End: 2019-04-15

## 2019-04-15 DIAGNOSIS — N52.9 ED (ERECTILE DYSFUNCTION): ICD-10-CM

## 2019-04-15 RX ORDER — SILDENAFIL 25 MG/1
25 TABLET, FILM COATED ORAL PRN
Qty: 30 TABLET | Refills: 0 | Status: CANCELLED | OUTPATIENT
Start: 2019-04-15

## 2019-04-15 NOTE — TELEPHONE ENCOUNTER
Pt stated [facility] has sent 2 requests for refill on Viagra  And have not received Rx back

## 2019-04-15 NOTE — TELEPHONE ENCOUNTER
Pt needs refills on Basaglar Kwikpen last written by Hospitalist Jerome Sharma.      Basaglar Kwikpen 100unit/ML  Qty 15  Last Fill 02/28/2019    Thank you  Noy Loomis [facility]

## 2019-04-15 NOTE — TELEPHONE ENCOUNTER
Called [facility], per pharmacist, Tai, they will request transfer of insulin glargine from [facility]. (dated 3-7-19) Read back of Rx performed.  Brooke Tim RN on 4/15/2019 at 10:38 AM

## 2019-04-16 DIAGNOSIS — N52.9 ED (ERECTILE DYSFUNCTION): ICD-10-CM

## 2019-04-16 RX ORDER — SILDENAFIL 25 MG/1
25 TABLET, FILM COATED ORAL PRN
Qty: 32 TABLET | Refills: 11 | Status: SHIPPED | OUTPATIENT
Start: 2019-04-16 | End: 2019-04-16

## 2019-04-16 RX ORDER — SILDENAFIL 25 MG/1
25 TABLET, FILM COATED ORAL PRN
Qty: 32 TABLET | Refills: 11 | Status: SHIPPED | OUTPATIENT
Start: 2019-04-16 | End: 2020-05-12

## 2019-04-19 DIAGNOSIS — E11.9 DIABETES MELLITUS TYPE 2, DIET-CONTROLLED (H): Primary | ICD-10-CM

## 2019-04-19 RX ORDER — INSULIN GLARGINE 100 [IU]/ML
23 INJECTION, SOLUTION SUBCUTANEOUS
Qty: 30 ML | Refills: 3 | Status: SHIPPED | OUTPATIENT
Start: 2019-04-19 | End: 2020-02-19

## 2019-04-19 NOTE — TELEPHONE ENCOUNTER
Chart review shows that patient with active order for Lantus on file as noted below, but not basaglar:    Outpatient Medication Detail      Disp Refills Start End AMANUEL   insulin glargine (LANTUS SOLOSTAR PEN) 100 UNIT/ML pen 9 mL 11 3/7/2019  No   Sig - Route: Inject 23 Units Subcutaneous every morning (before breakfast) - Subcutaneous   Sent to pharmacy as: insulin glargine (LANTUS SOLOSTAR PEN) 100 UNIT/ML pen   Class: E-Prescribe   Order: [id number]   E-Prescribing Status: Receipt confirmed by pharmacy (3/7/2019 10:42 AM CST)   Printout Tracking     External Result Report   Pharmacy     [id number] - [place], [place] - [address]     Writer is unable to authorize substitution as per pharmacy and patient request. Will monika up and route substitution request to PCP for his consideration/approval.    Unable to complete prescription refill per RN Medication Refill Policy. Jan Vang 4/19/2019 1:54 PM

## 2019-04-19 NOTE — TELEPHONE ENCOUNTER
Pt requesting rx for Basaglar Kwikpen (didn't see on med list), pt states using 23 units subcutaneous every morning before breakfast    Thank you!  Hetal August Kirill  [facility]

## 2019-05-02 ENCOUNTER — OFFICE VISIT (OUTPATIENT)
Dept: FAMILY MEDICINE | Facility: OTHER | Age: 64
End: 2019-05-02
Attending: NURSE PRACTITIONER
Payer: MEDICARE

## 2019-05-02 VITALS
WEIGHT: 166.25 LBS | HEART RATE: 91 BPM | BODY MASS INDEX: 26.09 KG/M2 | HEIGHT: 67 IN | TEMPERATURE: 98 F | RESPIRATION RATE: 15 BRPM | DIASTOLIC BLOOD PRESSURE: 80 MMHG | OXYGEN SATURATION: 97 % | SYSTOLIC BLOOD PRESSURE: 122 MMHG

## 2019-05-02 DIAGNOSIS — J06.9 VIRAL URI WITH COUGH: ICD-10-CM

## 2019-05-02 DIAGNOSIS — E11.9 TYPE 2 DIABETES MELLITUS WITHOUT COMPLICATION, WITH LONG-TERM CURRENT USE OF INSULIN (H): ICD-10-CM

## 2019-05-02 DIAGNOSIS — J42 CHRONIC BRONCHITIS, UNSPECIFIED CHRONIC BRONCHITIS TYPE (H): Primary | ICD-10-CM

## 2019-05-02 DIAGNOSIS — Z79.4 TYPE 2 DIABETES MELLITUS WITHOUT COMPLICATION, WITH LONG-TERM CURRENT USE OF INSULIN (H): ICD-10-CM

## 2019-05-02 PROCEDURE — G0463 HOSPITAL OUTPT CLINIC VISIT: HCPCS

## 2019-05-02 PROCEDURE — 99214 OFFICE O/P EST MOD 30 MIN: CPT | Performed by: NURSE PRACTITIONER

## 2019-05-02 RX ORDER — AZITHROMYCIN 250 MG/1
TABLET, FILM COATED ORAL
Qty: 6 TABLET | Refills: 0 | Status: SHIPPED | OUTPATIENT
Start: 2019-05-02 | End: 2019-06-04

## 2019-05-02 ASSESSMENT — PATIENT HEALTH QUESTIONNAIRE - PHQ9: SUM OF ALL RESPONSES TO PHQ QUESTIONS 1-9: 0

## 2019-05-02 ASSESSMENT — PAIN SCALES - GENERAL: PAINLEVEL: NO PAIN (0)

## 2019-05-02 ASSESSMENT — MIFFLIN-ST. JEOR: SCORE: 1497.74

## 2019-05-02 NOTE — PATIENT INSTRUCTIONS
Reduce morning and evening insulin to 5 units  Continue with noon insulin 3 units  Continue with lantus at 23 units daily  Check A1C at the end of the month-I will call with these results    Azithromycin daily for 5 days if your symptoms do not improve or worsen    Come in for evaluation if any other concerns
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
## 2019-05-02 NOTE — NURSING NOTE
Patient presents to clinic today for cold symptoms starting three days ago. He states he also wants to discuss blood sugars. He states in the morning and late evening he is dropping into the 60s.     No LMP for male patient.  Medication Reconciliation: complete    Jaleesa Chaparro LPN  5/2/2019 2:14 PM

## 2019-05-08 ENCOUNTER — TELEPHONE (OUTPATIENT)
Dept: PEDIATRICS | Facility: OTHER | Age: 64
End: 2019-05-08

## 2019-05-08 NOTE — TELEPHONE ENCOUNTER
"  Panel Management Review      Patient has the following on his problem list:     Diabetes    ASA: Failed    Last A1C  Lab Results   Component Value Date    A1C 12.4 2019    A1C 7.8 2018    A1C 6.5 10/06/2014    A1C 5.4 2013    A1C 5.3 09/10/2013     A1C tested: FAILED    Last LDL:    Lab Results   Component Value Date    CHOL 185 2018     Lab Results   Component Value Date    HDL 39 2018     Lab Results   Component Value Date    LDL 80 2018     Lab Results   Component Value Date    TRIG 331 2018     Lab Results   Component Value Date    CHOLHDLRATIO 4.2 10/06/2014     Lab Results   Component Value Date    NHDL 146 2018       Is the patient on a Statin? {YES/NO:083088}             Is the patient on Aspirin? {YES/NO:881669}        Last three blood pressure readings:  BP Readings from Last 3 Encounters:   19 122/80   19 138/78   19 120/75       Date of last diabetes office visit: ***     Tobacco History:     History   Smoking Status     Former Smoker     Packs/day: 2.00     Years: 15.00     Types: Cigarettes     Quit date: 1986   Smokeless Tobacco     Never Used           Composite cancer screening  Chart review shows that this patient is due/due soon for the following {CANCER SCREENIN}  Summary:    Patient is due/failing the following:   {BVC DUE ITEMS:598110}    Action needed:   {[facility] ACTION:195278}    Type of outreach:    {BVCPTCONTACT:577263}    Questions for provider review:    {NONE:282697::\"None\"}                                                                                                                                    {staff signature}     Chart routed to {Care Team / Provider:422367} .          "

## 2019-05-15 ENCOUNTER — TELEPHONE (OUTPATIENT)
Dept: FAMILY MEDICINE | Facility: OTHER | Age: 64
End: 2019-05-15

## 2019-05-15 DIAGNOSIS — T38.0X5A STEROID-INDUCED DIABETES MELLITUS (H): ICD-10-CM

## 2019-05-15 DIAGNOSIS — E09.9 STEROID-INDUCED DIABETES MELLITUS (H): ICD-10-CM

## 2019-05-15 NOTE — TELEPHONE ENCOUNTER
Patient calling in regards to [facility] in [place] is unable to dispense his arely contour test strips. He would like these sent to [facility] in [facility]. Will send to another provider to address if Hoa Olivarez's absence.     Jaleesa Chaaprro LPN...................5/15/2019  3:23 PM

## 2019-05-28 ENCOUNTER — TELEPHONE (OUTPATIENT)
Dept: TRANSPLANT | Facility: CLINIC | Age: 64
End: 2019-05-28

## 2019-05-28 NOTE — TELEPHONE ENCOUNTER
Transplant Social Work Services Phone Call      Data: Received a call from pt requesting assistance with his insulin medications. He is currently taking Novolog and Lantus and is finding it very difficult to afford his copays.  Intervention: Writer downloaded the forms for the patient assistance programs for both companies and have mailed them to him to take to his endocrinologist.  Assessment: Dieudonne was thankful for the assistance and will call if he has more questions or does not qualify.  Education provided by SW: Educated him on the income guidelines and how the programs work.  Plan:Pt will take the forms to his doctor and will apply for each program.    Gypsy Mann, [facility]  [phone]

## 2019-06-04 ENCOUNTER — OFFICE VISIT (OUTPATIENT)
Dept: FAMILY MEDICINE | Facility: OTHER | Age: 64
End: 2019-06-04
Attending: NURSE PRACTITIONER
Payer: MEDICARE

## 2019-06-04 ENCOUNTER — APPOINTMENT (OUTPATIENT)
Dept: LAB | Facility: OTHER | Age: 64
End: 2019-06-04
Attending: INTERNAL MEDICINE
Payer: MEDICARE

## 2019-06-04 VITALS
WEIGHT: 163.5 LBS | HEIGHT: 67 IN | TEMPERATURE: 97.8 F | HEART RATE: 67 BPM | SYSTOLIC BLOOD PRESSURE: 124 MMHG | BODY MASS INDEX: 25.66 KG/M2 | OXYGEN SATURATION: 99 % | RESPIRATION RATE: 16 BRPM | DIASTOLIC BLOOD PRESSURE: 78 MMHG

## 2019-06-04 DIAGNOSIS — I10 ESSENTIAL HYPERTENSION: ICD-10-CM

## 2019-06-04 DIAGNOSIS — G62.9 PERIPHERAL POLYNEUROPATHY: ICD-10-CM

## 2019-06-04 DIAGNOSIS — Z13.220 LIPID SCREENING: ICD-10-CM

## 2019-06-04 DIAGNOSIS — Z94.2 S/P LUNG TRANSPLANT (H): Chronic | ICD-10-CM

## 2019-06-04 DIAGNOSIS — E11.9 DIABETES MELLITUS TYPE 2, DIET-CONTROLLED (H): Primary | ICD-10-CM

## 2019-06-04 LAB
ALBUMIN SERPL-MCNC: 3.9 G/DL (ref 3.5–5.7)
ALP SERPL-CCNC: 324 U/L (ref 34–104)
ALT SERPL W P-5'-P-CCNC: 48 U/L (ref 7–52)
ANION GAP SERPL CALCULATED.3IONS-SCNC: 6 MMOL/L (ref 3–14)
AST SERPL W P-5'-P-CCNC: 43 U/L (ref 13–39)
BASOPHILS # BLD AUTO: 0 10E9/L (ref 0–0.2)
BASOPHILS NFR BLD AUTO: 0 %
BILIRUB SERPL-MCNC: 0.4 MG/DL (ref 0.3–1)
BUN SERPL-MCNC: 32 MG/DL (ref 7–25)
CALCIUM SERPL-MCNC: 9.1 MG/DL (ref 8.6–10.3)
CHLORIDE SERPL-SCNC: 108 MMOL/L (ref 98–107)
CHOLEST SERPL-MCNC: 164 MG/DL
CO2 SERPL-SCNC: 27 MMOL/L (ref 21–31)
CREAT SERPL-MCNC: 1.15 MG/DL (ref 0.7–1.3)
CREAT UR-MCNC: 107 MG/DL
DIFFERENTIAL METHOD BLD: ABNORMAL
EOSINOPHIL # BLD AUTO: 0.1 10E9/L (ref 0–0.7)
EOSINOPHIL NFR BLD AUTO: 2 %
ERYTHROCYTE [DISTWIDTH] IN BLOOD BY AUTOMATED COUNT: 13.3 % (ref 10–15)
GFR SERPL CREATININE-BSD FRML MDRD: 64 ML/MIN/{1.73_M2}
GLUCOSE SERPL-MCNC: 90 MG/DL (ref 70–105)
HBA1C MFR BLD: 5.7 % (ref 4–6)
HCT VFR BLD AUTO: 38.1 % (ref 40–53)
HDLC SERPL-MCNC: 43 MG/DL (ref 23–92)
HGB BLD-MCNC: 12.3 G/DL (ref 13.3–17.7)
LDLC SERPL CALC-MCNC: 86 MG/DL
LYMPHOCYTES # BLD AUTO: 2.1 10E9/L (ref 0.8–5.3)
LYMPHOCYTES NFR BLD AUTO: 58 %
MCH RBC QN AUTO: 33.1 PG (ref 26.5–33)
MCHC RBC AUTO-ENTMCNC: 32.3 G/DL (ref 31.5–36.5)
MCV RBC AUTO: 102 FL (ref 78–100)
METAMYELOCYTES # BLD: 0.1 10E9/L
METAMYELOCYTES NFR BLD MANUAL: 2 %
MICROALBUMIN UR-MCNC: <5 MG/L
MICROALBUMIN/CREAT UR: NORMAL MG/G CR (ref 0–17)
MONOCYTES # BLD AUTO: 0.2 10E9/L (ref 0–1.3)
MONOCYTES NFR BLD AUTO: 5 %
MYELOCYTES # BLD: 0 10E9/L
MYELOCYTES NFR BLD MANUAL: 1 %
NEUTROPHILS # BLD AUTO: 1.2 10E9/L (ref 1.6–8.3)
NEUTROPHILS NFR BLD AUTO: 32 %
NONHDLC SERPL-MCNC: 121 MG/DL
PLATELET # BLD AUTO: 173 10E9/L (ref 150–450)
PLATELET # BLD EST: ABNORMAL 10*3/UL
POTASSIUM SERPL-SCNC: 4.7 MMOL/L (ref 3.5–5.1)
PROT SERPL-MCNC: 6.3 G/DL (ref 6.4–8.9)
RBC # BLD AUTO: 3.72 10E12/L (ref 4.4–5.9)
RBC MORPH BLD: ABNORMAL
SODIUM SERPL-SCNC: 141 MMOL/L (ref 134–144)
TRIGL SERPL-MCNC: 176 MG/DL
TSH SERPL DL<=0.05 MIU/L-ACNC: 1.23 IU/ML (ref 0.34–5.6)
VIT B12 SERPL-MCNC: >1500 PG/ML (ref 180–914)
WBC # BLD AUTO: 3.6 10E9/L (ref 4–11)

## 2019-06-04 PROCEDURE — 99214 OFFICE O/P EST MOD 30 MIN: CPT | Performed by: NURSE PRACTITIONER

## 2019-06-04 PROCEDURE — 84443 ASSAY THYROID STIM HORMONE: CPT | Mod: ZL | Performed by: NURSE PRACTITIONER

## 2019-06-04 PROCEDURE — 36415 COLL VENOUS BLD VENIPUNCTURE: CPT | Mod: ZL | Performed by: NURSE PRACTITIONER

## 2019-06-04 PROCEDURE — G0463 HOSPITAL OUTPT CLINIC VISIT: HCPCS

## 2019-06-04 PROCEDURE — 82043 UR ALBUMIN QUANTITATIVE: CPT | Mod: ZL | Performed by: NURSE PRACTITIONER

## 2019-06-04 PROCEDURE — 85025 COMPLETE CBC W/AUTO DIFF WBC: CPT | Mod: ZL | Performed by: NURSE PRACTITIONER

## 2019-06-04 PROCEDURE — 80061 LIPID PANEL: CPT | Mod: ZL | Performed by: NURSE PRACTITIONER

## 2019-06-04 PROCEDURE — 80053 COMPREHEN METABOLIC PANEL: CPT | Mod: ZL | Performed by: NURSE PRACTITIONER

## 2019-06-04 PROCEDURE — 82607 VITAMIN B-12: CPT | Mod: ZL | Performed by: NURSE PRACTITIONER

## 2019-06-04 PROCEDURE — 83036 HEMOGLOBIN GLYCOSYLATED A1C: CPT | Mod: ZL | Performed by: NURSE PRACTITIONER

## 2019-06-04 RX ORDER — FUROSEMIDE 20 MG
20 TABLET ORAL DAILY
Qty: 90 TABLET | Refills: 4 | Status: SHIPPED | OUTPATIENT
Start: 2019-06-04 | End: 2020-06-05

## 2019-06-04 RX ORDER — METOPROLOL TARTRATE 25 MG/1
25 TABLET, FILM COATED ORAL 2 TIMES DAILY
Qty: 180 TABLET | Refills: 4 | Status: SHIPPED | OUTPATIENT
Start: 2019-06-04 | End: 2020-06-05

## 2019-06-04 RX ORDER — LISINOPRIL 5 MG/1
5 TABLET ORAL DAILY
Qty: 90 TABLET | Refills: 4 | Status: SHIPPED | OUTPATIENT
Start: 2019-06-04 | End: 2020-06-05

## 2019-06-04 ASSESSMENT — PATIENT HEALTH QUESTIONNAIRE - PHQ9: SUM OF ALL RESPONSES TO PHQ QUESTIONS 1-9: 0

## 2019-06-04 ASSESSMENT — PAIN SCALES - GENERAL: PAINLEVEL: NO PAIN (0)

## 2019-06-04 ASSESSMENT — MIFFLIN-ST. JEOR: SCORE: 1485.26

## 2019-06-04 NOTE — LETTER
June 4, 2019      Aubrey Duncan  [address]  [facility] [address]        Dear ,    We are writing to inform you of your test results.    Overall your labs are significantly improved compared to 3 months ago.  Your A1c is down to 5.7.  Congratulations on the significant improvement.  I recommend that you continue with current treatment plan and follow-up with me in 3 months.      Resulted Orders   Lipid Panel   Result Value Ref Range    Cholesterol 164 <200 mg/dL    Triglycerides 176 (H) <150 mg/dL      Comment:      Borderline high:  150-199 mg/dl  High:             200-499 mg/dl  Very high:       >499 mg/dl      HDL Cholesterol 43 23 - 92 mg/dL    LDL Cholesterol Calculated 86 <100 mg/dL      Comment:      Desirable:       <100 mg/dl    Non HDL Cholesterol 121 <130 mg/dL   Vitamin B12   Result Value Ref Range    Vitamin B12 >1,500 (H) 180 - 914 pg/mL   CBC and Differential   Result Value Ref Range    WBC 3.6 (L) 4.0 - 11.0 10e9/L    RBC Count 3.72 (L) 4.4 - 5.9 10e12/L    Hemoglobin 12.3 (L) 13.3 - 17.7 g/dL    Hematocrit 38.1 (L) 40.0 - 53.0 %     (H) 78 - 100 fl    MCH 33.1 (H) 26.5 - 33.0 pg    MCHC 32.3 31.5 - 36.5 g/dL    RDW 13.3 10.0 - 15.0 %    Platelet Count 173 150 - 450 10e9/L    Diff Method Manual Differential     % Neutrophils 32.0 %    % Lymphocytes 58.0 %    % Monocytes 5.0 %    % Eosinophils 2.0 %    % Basophils 0.0 %    Absolute Neutrophil 1.2 (L) 1.6 - 8.3 10e9/L    Absolute Lymphocytes 2.1 0.8 - 5.3 10e9/L    Absolute Monocytes 0.2 0.0 - 1.3 10e9/L    Absolute Eosinophils 0.1 0.0 - 0.7 10e9/L    Absolute Basophils 0.0 0.0 - 0.2 10e9/L    % Metamyelocytes 2.0 %    % Myelocytes 1.0 %    Platelet Estimate Confirming automated cell count     RBC Morphology Consistent with reported results     Absolute Metamyelocytes 0.1 (H) 0 10e9/L    Absolute Myelocytes 0.0 0 10e9/L   TSH   Result Value Ref Range    Thyrotropin 1.23 0.34 - 5.60 IU/mL   Comprehensive Metabolic Panel   Result Value Ref  Range    Sodium 141 134 - 144 mmol/L    Potassium 4.7 3.5 - 5.1 mmol/L    Chloride 108 (H) 98 - 107 mmol/L    Carbon Dioxide 27 21 - 31 mmol/L    Anion Gap 6 3 - 14 mmol/L    Glucose 90 70 - 105 mg/dL    Urea Nitrogen 32 (H) 7 - 25 mg/dL    Creatinine 1.15 0.70 - 1.30 mg/dL    GFR Estimate 64 >60 mL/min/[1.73_m2]    GFR Estimate If Black 77 >60 mL/min/[1.73_m2]    Calcium 9.1 8.6 - 10.3 mg/dL    Bilirubin Total 0.4 0.3 - 1.0 mg/dL    Albumin 3.9 3.5 - 5.7 g/dL    Protein Total 6.3 (L) 6.4 - 8.9 g/dL    Alkaline Phosphatase 324 (H) 34 - 104 U/L    ALT 48 7 - 52 U/L    AST 43 (H) 13 - 39 U/L   Hemoglobin A1c   Result Value Ref Range    Hemoglobin A1C 5.7 4.0 - 6.0 %       If you have any questions or concerns, please call the clinic at the number listed above.     Sincerely,        BRANDI Manning CNP

## 2019-06-04 NOTE — LETTER
My COPD Action Plan     Name: Aubrey Duncan    YOB: 1955   Date: 6/4/2019    My doctor: BRANDI Manning CNP   My clinic: [facility]    [address]  [place] [address]  [phone]  My Controller Medicine: Albuterol (Proair/Ventolin/Proventolin)   Dose: 90 Q6H PRN     My Rescue Medicine: Albuterol (Proair/Ventolin/Proventil) inhaler   Dose: 90     My Flare Up Medicine: Prednisone   Dose: 5mg in AM 2.5mg in PM     My COPD Severity: N/A      Use of Oxygen: none     Make sure you've had your pneumonia   vaccines.          GREEN ZONE       Doing well today      Usual level of activity and exercise    Usual amount of cough and mucus    No shortness of breath    Usual level of health (thinking clearly, sleeping well, feel like eating) Actions:      Take daily medicines    Use oxygen as prescribed    Follow regular exercise and diet plan    Avoid cigarette smoke and other irritants that harm the lungs           YELLOW ZONE          Having a bad day or flare up      Short of breath more than usual    A lot more sputum (mucus) than usual    Sputum looks yellow, green, tan, brown or bloody    More coughing or wheezing    Fever or chills    Less energy; trouble completing activities    Trouble thinking or focusing    Using quick relief inhaler or nebulizer more often    Poor sleep; symptoms wake me up    Do not feel like eating Actions:      Get plenty of rest    Take daily medicines    Use quick relief inhaler every 6 hours    If you use oxygen, call you doctor to see if you should adjust your oxygen    Do breathing exercises or other things to help you relax    Let a loved one, friend or neighbor know you are feeling worse    Call your care team if you have 2 or more symptoms.  Start taking steroids or antibiotics if directed by your care team           RED ZONE       Need medical care now      Severe shortness of breath (feel you can't breathe)    Fever,  chills    Not enough breath to do any activity    Trouble coughing up mucus, walking or talking    Blood in mucus    Frequent coughing   Rescue medicines are not working    Not able to sleep because of breathing    Feel confused or drowsy    Chest pain    Actions:      Call your health care team.  If you cannot reach your care team, call 911 or go to the emergency room.        Annual Reminders:  Meet with Care Team, Flu Shot every Fall  Pharmacy:    [id number] - [place], [place] - [address]  [facility] - [place], [place] - [address] [facility] [id number] [facility] [employer] - [place] - [address]  [id number] - [place], [place] - [address]
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
## 2019-06-04 NOTE — NURSING NOTE
Patient presents to clinic today for his welcome to medicare physical. He states he has concerns about his feet going numb.     No LMP for male patient.  Medication Reconciliation: complete    Jaleesa Chaparro LPN  6/4/2019 8:22 AM    Previous A1C is not at goal of <8  Lab Results   Component Value Date    A1C 12.4 02/24/2019    A1C 7.8 08/22/2018    A1C 6.5 10/06/2014    A1C 5.4 09/12/2013    A1C 5.3 09/10/2013     Urine microalbumin:creatine: DUE  Foot exam DUE  Eye exam DUE    Tobacco User FORMER  Patient is on a daily aspirin  Patient is on a Statin.  Blood pressure today of:     BP Readings from Last 1 Encounters:   05/02/19 122/80      is at the goal of <139/89 for diabetics.    Jaleesa Chaparro LPN on 6/4/2019 at 8:23 AM

## 2019-06-07 ENCOUNTER — TELEPHONE (OUTPATIENT)
Dept: FAMILY MEDICINE | Facility: OTHER | Age: 64
End: 2019-06-07

## 2019-06-07 DIAGNOSIS — E11.9 DIABETES MELLITUS TYPE 2, DIET-CONTROLLED (H): Primary | ICD-10-CM

## 2019-06-07 RX ORDER — ROSUVASTATIN CALCIUM 40 MG/1
40 TABLET, COATED ORAL DAILY
Qty: 90 TABLET | Refills: 3 | Status: SHIPPED | OUTPATIENT
Start: 2019-06-07 | End: 2019-09-10

## 2019-06-07 NOTE — TELEPHONE ENCOUNTER
Spoke with patient and he states he has never taken anything for his cholesterol.   Jaleesa Chaparro LPN...................6/7/2019  9:11 AM

## 2019-06-07 NOTE — TELEPHONE ENCOUNTER
Spoke with patient and relayed message below. Patient verbalized understanding and was transferred to appointment line to schedule lab only in 6 weeks.   Jaleesa Chaparro LPN...................6/7/2019  11:00 AM

## 2019-06-07 NOTE — TELEPHONE ENCOUNTER
Given his diagnosis of DM, HTN I would recommend a statin. He has a 10 year risk of heart attack/stroke of 24%. Anything over 7% we recommend treatment. I have prescribed rosuvastatin to take daily in the evening. He should have his liver functions tested in 6 weeks and I have placed this order. Please let me know if he has any other questions. BRANDI Manning CNP on 6/7/2019 at 10:24 AM

## 2019-06-07 NOTE — TELEPHONE ENCOUNTER
Please call and ask patient if he has ever taken a statin medication for his cholesterol. If he has, why is not not any longer? BRANDI Manning CNP on 6/7/2019 at 8:24 AM

## 2019-06-10 ENCOUNTER — TELEPHONE (OUTPATIENT)
Dept: TRANSPLANT | Facility: CLINIC | Age: 64
End: 2019-06-10

## 2019-06-10 DIAGNOSIS — Z94.2 S/P LUNG TRANSPLANT (H): Primary | Chronic | ICD-10-CM

## 2019-06-10 RX ORDER — SULFAMETHOXAZOLE/TRIMETHOPRIM 800-160 MG
1 TABLET ORAL
Qty: 13 TABLET | Refills: 11 | COMMUNITY
Start: 2019-06-10 | End: 2019-06-19 | Stop reason: DRUGHIGH

## 2019-06-10 NOTE — TELEPHONE ENCOUNTER
Aubrey calls with update of Crestor (Rosuvastatin) ordered for hyperlipidemia from his PCP.  Labs again to check effects in 6 weeks.    Med list reviewed and found Aubrey is taking Bactrim DS three times weekly since February's admission to our hospital for pseudomonas pneumonia and bacteremia.  He was septic at time.    Asking Dr Broussard to evaluate bactrim dose.

## 2019-06-10 NOTE — TELEPHONE ENCOUNTER
Patient Call: General  Route to LPN    Reason for call: PT had some questions about some medication that his doctor recommend.  They want him to go on Rosuvastatin, wants to make sure they are compatible looking for a second option      Call back needed? Yes    Return Call Needed  Same as documented in contacts section  When to return call?: Greater than one day: Route standard priority

## 2019-06-18 DIAGNOSIS — Z94.2 LUNG TRANSPLANT STATUS, BILATERAL (H): ICD-10-CM

## 2019-06-19 ENCOUNTER — TELEPHONE (OUTPATIENT)
Dept: TRANSPLANT | Facility: CLINIC | Age: 64
End: 2019-06-19

## 2019-06-19 DIAGNOSIS — T38.0X5A STEROID-INDUCED DIABETES MELLITUS (H): ICD-10-CM

## 2019-06-19 DIAGNOSIS — E09.9 STEROID-INDUCED DIABETES MELLITUS (H): ICD-10-CM

## 2019-06-19 DIAGNOSIS — E11.9 DIABETES MELLITUS TYPE 2, DIET-CONTROLLED (H): ICD-10-CM

## 2019-06-19 DIAGNOSIS — Z94.2 LUNG REPLACED BY TRANSPLANT (H): Primary | ICD-10-CM

## 2019-06-19 RX ORDER — SULFAMETHOXAZOLE AND TRIMETHOPRIM 400; 80 MG/1; MG/1
1 TABLET ORAL
COMMUNITY
Start: 2019-06-19 | End: 2019-09-18

## 2019-06-19 NOTE — TELEPHONE ENCOUNTER
Kirk Broussard MD Lindgren, Roberta R, RN   Cc: Thierno Soni RN             Yes, please decrease bactrim to one single strength three times a week. Thanks, MH      Per Dr. Broussard patient to decrease bactrim dose to 1 400-80 tablet 3 times per week, spoke with Aubrey on the telephone and he agrees with the plan. Patient states he just received another refill of the DS patient states he will split the pill in half to equal the ordered dose. Checked with Vanda Shanks pharmacist and she states that this is okay to split the pill.

## 2019-06-19 NOTE — TELEPHONE ENCOUNTER
Refill Request for: Omeprazole (PRILOSEC) 20 MG CR capsule   Received From: [facility]  Last Written Prescription Date: 06/14/18 for 180 tablets/capsules and 4 refills  LOV: 05/02/19 with BRANDI Berumen  Next Appointment: No upcoming office visit on file during initial refill review with PCP  Protocol: PPI Protocol Passed - 6/19 10:51 AM    Routing refill request to provider for review/approval because:  Patient stated wanting to change PCP to BRANDI Berumen.

## 2019-06-21 NOTE — TELEPHONE ENCOUNTER
Per pharmacy-PATIENT SAYS HE NOW TESTS 4 TIMES DAILY. CAN WE GET A NEWRX ASAP PLEASE? Please note updated sig as patient states is testing.  Please review, sign and send if agree.     DM supplies   LOV-06/04/2019  Future Office visit:       Routing refill request to provider for review/approval.     Unable to complete prescription refill per RNMedication Refill Policy.................... Meeta Akbar ....................  6/21/2019   2:29 PM

## 2019-07-18 ENCOUNTER — TELEPHONE (OUTPATIENT)
Dept: FAMILY MEDICINE | Facility: OTHER | Age: 64
End: 2019-07-18

## 2019-07-18 DIAGNOSIS — I25.10 ARTERIOSCLEROTIC CARDIOVASCULAR DISEASE (ASCVD): Primary | ICD-10-CM

## 2019-07-18 DIAGNOSIS — E11.9 DIABETES MELLITUS TYPE 2, DIET-CONTROLLED (H): ICD-10-CM

## 2019-07-18 LAB
ALBUMIN SERPL-MCNC: 3.8 G/DL (ref 3.5–5.7)
ALP SERPL-CCNC: 371 U/L (ref 34–104)
ALT SERPL W P-5'-P-CCNC: 116 U/L (ref 7–52)
AST SERPL W P-5'-P-CCNC: 84 U/L (ref 13–39)
BILIRUB DIRECT SERPL-MCNC: 0.1 MG/DL (ref 0–0.2)
BILIRUB SERPL-MCNC: 0.5 MG/DL (ref 0.3–1)
PROT SERPL-MCNC: 6.3 G/DL (ref 6.4–8.9)

## 2019-07-18 PROCEDURE — 36415 COLL VENOUS BLD VENIPUNCTURE: CPT | Performed by: NURSE PRACTITIONER

## 2019-07-18 PROCEDURE — 80076 HEPATIC FUNCTION PANEL: CPT | Performed by: NURSE PRACTITIONER

## 2019-07-18 NOTE — TELEPHONE ENCOUNTER
Delisa    Please call the patient to  to let him know that there is some elevation in liver enzymes from the medication as it does work through the liver    With his transplant and chronic immunosuppressive therapies--- I would suggest reducing the dose to a half a pill or 20 mg every other day or 3 times a week x4 weeks with a follow-up comp panel to make sure he is stable on this dose    I will set up an order for the lab if you could route this to Dr. Olivarez and she could sign and make any other changes that she wishes to for follow-up with the patient when she returns next week    Delisa can you also change the med list to reflect this thank you    Asya Kennedy, APRN CNP   July 18, 2019

## 2019-07-18 NOTE — TELEPHONE ENCOUNTER
Called and spoke to Patient after verifying last name and date of birth. Pt notified of Asya Kennedy's recommendation. The patient indicates understanding of these issues and agrees with the plan.    Will route to DMO for review and consideration, when she returns next week. Brooke Nicholas RN .............. 7/18/2019  1:11 PM

## 2019-08-01 ENCOUNTER — TELEPHONE (OUTPATIENT)
Dept: FAMILY MEDICINE | Facility: OTHER | Age: 64
End: 2019-08-01

## 2019-08-01 NOTE — TELEPHONE ENCOUNTER
Calling with questions about orders for labs.  Would like to know when they are suppose to be done and when are the orders going to be put in

## 2019-08-01 NOTE — TELEPHONE ENCOUNTER
Patient calling in regards to lab work and follow up. Reviewed letter and lab results. He will follow up with lab only at the end of the month and next month for his 3 month diabetic check.     Jaleesa Chaparro LPN...................8/1/2019  10:37 AM

## 2019-08-19 DIAGNOSIS — T38.0X5A STEROID-INDUCED DIABETES MELLITUS (H): Primary | ICD-10-CM

## 2019-08-19 DIAGNOSIS — E09.9 STEROID-INDUCED DIABETES MELLITUS (H): Primary | ICD-10-CM

## 2019-08-19 NOTE — TELEPHONE ENCOUNTER
[facility] in [place], sent Rx request for the following:      insulin pen needle (32G X 4 MM) 32G X 4 MM miscellaneous.  Sig: Use 4 pen needles daily or as directed.  Last Prescription Date:   2/28/19  Last Fill Qty/Refills:         200, R-3    Last Office Visit:              6/4/19  Future Office visit:             Next 5 appointments (look out 90 days)    Sep 10, 2019  9:00 AM CDT  SHORT with BRANDI Berumen CNP  [facility] ([facility]) [address]  [place] [id number] [phone]        Noted upcoming appointment. Prescription approved per [facility] for #110 limited refill, with note to pharmacy. Brooke Nicholas RN .............. 8/19/2019  9:16 AM

## 2019-08-29 DIAGNOSIS — E11.9 DIABETES MELLITUS TYPE 2, DIET-CONTROLLED (H): ICD-10-CM

## 2019-08-29 DIAGNOSIS — I25.10 ARTERIOSCLEROTIC CARDIOVASCULAR DISEASE (ASCVD): ICD-10-CM

## 2019-08-29 LAB
ALBUMIN SERPL-MCNC: 3.7 G/DL (ref 3.5–5.7)
ALP SERPL-CCNC: 189 U/L (ref 34–104)
ALT SERPL W P-5'-P-CCNC: 52 U/L (ref 7–52)
ANION GAP SERPL CALCULATED.3IONS-SCNC: 7 MMOL/L (ref 3–14)
AST SERPL W P-5'-P-CCNC: 36 U/L (ref 13–39)
BILIRUB SERPL-MCNC: 0.4 MG/DL (ref 0.3–1)
BUN SERPL-MCNC: 27 MG/DL (ref 7–25)
CALCIUM SERPL-MCNC: 9 MG/DL (ref 8.6–10.3)
CHLORIDE SERPL-SCNC: 107 MMOL/L (ref 98–107)
CHOLEST SERPL-MCNC: 136 MG/DL
CO2 SERPL-SCNC: 27 MMOL/L (ref 21–31)
CREAT SERPL-MCNC: 1.14 MG/DL (ref 0.7–1.3)
GFR SERPL CREATININE-BSD FRML MDRD: 64 ML/MIN/{1.73_M2}
GLUCOSE SERPL-MCNC: 86 MG/DL (ref 70–105)
HDLC SERPL-MCNC: 45 MG/DL (ref 23–92)
LDLC SERPL CALC-MCNC: 60 MG/DL
NONHDLC SERPL-MCNC: 91 MG/DL
POTASSIUM SERPL-SCNC: 5 MMOL/L (ref 3.5–5.1)
PROT SERPL-MCNC: 5.9 G/DL (ref 6.4–8.9)
SODIUM SERPL-SCNC: 141 MMOL/L (ref 134–144)
TRIGL SERPL-MCNC: 154 MG/DL

## 2019-08-29 PROCEDURE — 36415 COLL VENOUS BLD VENIPUNCTURE: CPT | Performed by: NURSE PRACTITIONER

## 2019-08-29 PROCEDURE — 80061 LIPID PANEL: CPT | Performed by: NURSE PRACTITIONER

## 2019-08-29 PROCEDURE — 80053 COMPREHEN METABOLIC PANEL: CPT | Performed by: NURSE PRACTITIONER

## 2019-09-10 ENCOUNTER — OFFICE VISIT (OUTPATIENT)
Dept: FAMILY MEDICINE | Facility: OTHER | Age: 64
End: 2019-09-10
Attending: NURSE PRACTITIONER
Payer: MEDICARE

## 2019-09-10 VITALS
TEMPERATURE: 97.9 F | RESPIRATION RATE: 16 BRPM | OXYGEN SATURATION: 99 % | HEIGHT: 67 IN | WEIGHT: 165.13 LBS | DIASTOLIC BLOOD PRESSURE: 74 MMHG | BODY MASS INDEX: 25.92 KG/M2 | HEART RATE: 65 BPM | SYSTOLIC BLOOD PRESSURE: 132 MMHG

## 2019-09-10 DIAGNOSIS — E09.9 STEROID-INDUCED DIABETES MELLITUS (H): ICD-10-CM

## 2019-09-10 DIAGNOSIS — T38.0X5A STEROID-INDUCED DIABETES MELLITUS (H): ICD-10-CM

## 2019-09-10 DIAGNOSIS — E11.9 DIABETES MELLITUS TYPE 2, DIET-CONTROLLED (H): ICD-10-CM

## 2019-09-10 LAB — HBA1C MFR BLD: 5.3 % (ref 4–6)

## 2019-09-10 PROCEDURE — G0463 HOSPITAL OUTPT CLINIC VISIT: HCPCS

## 2019-09-10 PROCEDURE — 99214 OFFICE O/P EST MOD 30 MIN: CPT | Performed by: NURSE PRACTITIONER

## 2019-09-10 PROCEDURE — 36415 COLL VENOUS BLD VENIPUNCTURE: CPT | Mod: ZL | Performed by: NURSE PRACTITIONER

## 2019-09-10 PROCEDURE — 83036 HEMOGLOBIN GLYCOSYLATED A1C: CPT | Mod: ZL | Performed by: NURSE PRACTITIONER

## 2019-09-10 RX ORDER — ROSUVASTATIN CALCIUM 40 MG/1
TABLET, COATED ORAL
Qty: 90 TABLET | Refills: 3 | COMMUNITY
Start: 2019-09-10 | End: 2022-06-13

## 2019-09-10 SDOH — HEALTH STABILITY: MENTAL HEALTH: HOW OFTEN DO YOU HAVE A DRINK CONTAINING ALCOHOL?: NEVER

## 2019-09-10 ASSESSMENT — PATIENT HEALTH QUESTIONNAIRE - PHQ9: SUM OF ALL RESPONSES TO PHQ QUESTIONS 1-9: 0

## 2019-09-10 ASSESSMENT — PAIN SCALES - GENERAL: PAINLEVEL: NO PAIN (0)

## 2019-09-10 ASSESSMENT — MIFFLIN-ST. JEOR: SCORE: 1487.63

## 2019-09-10 NOTE — PROGRESS NOTES
HPI:    Aubrey Duncan is a 64 year old male who presents to clinic today for diabetes check.  He was last seen for diabetes management 3 months ago.  His blood sugars have been ranging from 67-1 33.  He continues taking Lantus 23 units daily as well as NovoLog 3 times daily.  He previously was using 7 units 3 times daily but is now taking 5 units in the morning 3 in the afternoon and 5 in the evening.  He will adjust this as needed if his blood sugars are low.  He continues to have some numbness and tingling to the bottoms of his feet but this has continued to improve.  He plans to see the eye doctor in the next month or 2.  He reports he stopped his aspirin prior to the lung transplant per the transplant team.  He has talked to them multiple times since the transplant and they continue to request that he not be on aspirin.    He does have follow-up with pulmonology in October regarding his lung transplant.  Denies any other concerns at this time.    Past Medical History:   Diagnosis Date     Alpha-1-antitrypsin deficiency (H)      Basal cell carcinoma      CMV (cytomegalovirus infection) (H)     Reacttivation Sept 2013 when valcyte held     DVT of upper extremity (deep vein thrombosis) (H) Sept 2013    Nonocclusive thrombosis extending from the right subclavian vein to the right axillary vein,  Segmental occlusion of right basilic vein in the upper arm. 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    With DVT and thrombocytopenia     Hypertension      Lung transplant status, bilateral (H) Sept 8, 2013    Complicated by HIT and esophageal dysfunction     Squamous cell carcinoma      Steroid-induced diabetes mellitus (H)      Thrombocytopaenia     due to HIT       Past Surgical History:   Procedure Laterality Date     BRONCHOSCOPY FLEXIBLE AND RIGID  9/17/2013     Procedure: BRONCHOSCOPY FLEXIBLE AND RIGID;;  Surgeon: Terrell Gonsales MD;  Location: [facility]     CATARACT IOL, RT/LT      Left Eye     COLONOSCOPY  08/17/2018    tubular adenomas follow up 2021     CYSTOSCOPY, RETROGRADES, INSERT STENT URETER(S), COMBINED Left 10/18/2017    Procedure: COMBINED CYSTOSCOPY, RETROGRADES, INSERT STENT URETER(S);  Cystoscopy, Retrograde Pyelogram, Ureteral Stent Placement ;  Surgeon: Darwin Jimenez MD;  Location: [facility]     ESOPHAGOSCOPY, GASTROSCOPY, DUODENOSCOPY (EGD), COMBINED  9/12/2013    Procedure: COMBINED ESOPHAGOSCOPY, GASTROSCOPY, DUODENOSCOPY (EGD), REMOVE FOREIGN BODY;  Robbins net platinum used;  Surgeon: Anastasia Farah MD;  Location: [facility]     ESOPHAGOSCOPY, GASTROSCOPY, DUODENOSCOPY (EGD), COMBINED       ESOPHAGOSCOPY, GASTROSCOPY, DUODENOSCOPY (EGD), COMBINED N/A 12/7/2015    Procedure: COMBINED ESOPHAGOSCOPY, GASTROSCOPY, DUODENOSCOPY (EGD), BIOPSY SINGLE OR MULTIPLE;  Surgeon: Henry Lane MD;  Location: [facility]     ESOPHAGOSCOPY, GASTROSCOPY, DUODENOSCOPY (EGD), DILATATION, COMBINED  11/6/2013    Procedure: COMBINED ESOPHAGOSCOPY, GASTROSCOPY, DUODENOSCOPY (EGD), DILATATION;;  Surgeon: Ting Medellin MD;  Location: [facility]     HC ESOPH/GAS REFLUX TEST W NASAL IMPED >1 HR  8/2/2012    Procedure: ESOPHAGEAL IMPEDENCE FUNCTION TEST WITH 24 HOUR PH GREATER THAN 1 HOUR;  Surgeon: Liyah Boss MD;  Location: [facility]     LASER HOLMIUM LITHOTRIPSY URETER(S), INSERT STENT, COMBINED Left 11/9/2017    Procedure: COMBINED CYSTOSCOPY, URETEROSCOPY, LASER HOLMIUM LITHOTRIPSY URETER(S), INSERT STENT;  Cystoscopy, Left Ureteroscopy, Laser Lithotripsy, Stent Replacement;  Surgeon: Osvaldo Marquis MD;  Location: [facility]     LUNG SURGERY       MOHS MICROGRAPHIC PROCEDURE       PICC INSERTION Left 9/22/2014    5fr DL Power PICC, 49cm (3cm external) in the L basilic vein w/ tip in the SVC RA junction.     REPAIR IRIS  1970    repair of trauma when a  megan went into his eye     TONSILLECTOMY       TRANSPLANT LUNG RECIPIENT SINGLE X2  2013    Procedure: TRANSPLANT LUNG RECIPIENT SINGLE X2;  Bilateral Lung Transplant; On-Pump Oxygenator; Flexible Bronchoscopy;  Surgeon: Padmini Aleman MD;  Location:  OR       Family History   Problem Relation Age of Onset     Heart Failure Mother          with CHF at age 95     Asthma Mother      C.A.D. Mother      Asthma Sister      Diabetes Sister      Hypertension Sister      Hypertension Daughter      Other - See Comments Sister         bleeding disorder     Other - See Comments Daughter         fibromyalgia     Cerebrovascular Disease Father          at age 83 with ministrokes; had arthritis as a farmer     Skin Cancer No family hx of      Melanoma No family hx of        Social History     Socioeconomic History     Marital status:      Spouse name: Kyung     Number of children: 1     Years of education: Not on file     Highest education level: Not on file   Occupational History     Occupation: Sanitation business owner; construction     Employer: [employer]   Social Needs     Financial resource strain: Not on file     Food insecurity:     Worry: Not on file     Inability: Not on file     Transportation needs:     Medical: Not on file     Non-medical: Not on file   Tobacco Use     Smoking status: Former Smoker     Packs/day: 2.00     Years: 15.00     Pack years: 30.00     Types: Cigarettes     Last attempt to quit: 1986     Years since quittin.7     Smokeless tobacco: Never Used   Substance and Sexual Activity     Alcohol use: No     Alcohol/week: 0.0 oz     Frequency: Never     Drug use: No     Sexual activity: Yes     Partners: Female   Lifestyle     Physical activity:     Days per week: Not on file     Minutes per session: Not on file     Stress: Not on file   Relationships     Social connections:     Talks on phone: Not on file     Gets together: Not on file     Attends Orthodox service:  Not on file     Active member of club or organization: Not on file     Attends meetings of clubs or organizations: Not on file     Relationship status: Not on file     Intimate partner violence:     Fear of current or ex partner: Not on file     Emotionally abused: Not on file     Physically abused: Not on file     Forced sexual activity: Not on file   Other Topics Concern     Parent/sibling w/ CABG, MI or angioplasty before 65F 55M? Not Asked   Social History Narrative     Not on file       Current Outpatient Medications   Medication Sig Dispense Refill     albuterol (PROAIR HFA, PROVENTIL HFA, VENTOLIN HFA) 108 (90 BASE) MCG/ACT inhaler Inhale 2 puffs into the lungs every 6 hours as needed for shortness of breath / dyspnea or wheezing 3 Inhaler 11     alcohol swab prep pads Use to swab area of injection/olga as directed. 200 each 3     azaTHIOprine (IMURAN) 50 MG tablet Take 0.5 tablets (25 mg) by mouth daily 15 tablet 11     BD SHARPS CONTAINER HOME MISC Use at home for sharps 1 each 0     blood glucose (ZOHREH MICROLET 2) lancing device Device to be used with lancets. 90 each 0     blood glucose (NO BRAND SPECIFIED) test strip USE AS DIRECTED FOR TESTING BLOOD GLUCOSE 4 TIMES DAILY 400 each 3     blood glucose monitoring (ZOHREH MICROLET) lancets USE AS DIRECTED FOR TESTING BLOOD GLUCOSE 4 TIMES DAILY 400 each 3     calcium-vitamin D (CALTRATE) 600-400 MG-UNIT per tablet Take 1 tablet by mouth 2 times daily (with meals) 60 tablet 12     ferrous sulfate (FEROSUL) 325 (65 Fe) MG tablet Take 325 mg by mouth daily (with breakfast)       fluorouracil (EFUDEX) 5 % cream Apply topically daily Use once per day for 10 days on areas of scalp, forehead and face that are scaled and gritty (one month on the central forehead). Avoid eyes. 40 g 1     furosemide (LASIX) 20 MG tablet Take 1 tablet (20 mg) by mouth daily 90 tablet 4     insulin aspart (NOVOLOG PEN) 100 UNIT/ML pen Take 7U of insulin within 30 minutes of start of  breakfast, lunch, and dinner. Do not give if blood sugar is less than 70 mg/dl. 10 mL 11     insulin glargine (BASAGLAR KWIKPEN) 100 UNIT/ML pen Inject 23 Units Subcutaneous every morning (before breakfast) 30 mL 3     insulin pen needle (32G X 4 MM) 32G X 4 MM miscellaneous Use 4 pen needles daily or as directed. Dispense as insurance allows. Dx. Code: E09.9 110 each 0     lisinopril (PRINIVIL/ZESTRIL) 5 MG tablet Take 1 tablet (5 mg) by mouth daily 90 tablet 4     loperamide (IMODIUM) 2 MG capsule Take 1 capsule (2 mg) by mouth 4 times daily as needed for diarrhea 120 capsule 12     metoprolol tartrate (LOPRESSOR) 25 MG tablet Take 1 tablet (25 mg) by mouth 2 times daily 180 tablet 4     multivitamin, therapeutic (THERA-VIT) TABS Take 1 tablet by mouth daily 30 tablet 12     MYFORTIC (BRAND) 180 MG EC tablet Take 180 mg by mouth 2 times daily       omeprazole (PRILOSEC) 20 MG DR capsule Take 1 capsule (20 mg) by mouth 2 times daily (before meals) 180 capsule 3     predniSONE (DELTASONE) 5 MG tablet TAKE ONE TABLET BY MOUTH EVERY MORNING AND TAKE ONE-HALF TABLET BY MOUTH EVERY EVENING 45 tablet 12     rosuvastatin (CRESTOR) 40 MG tablet Take 20mg (half tablet) three times weekly 90 tablet 3     sildenafil (VIAGRA) 25 MG tablet Take 1 tablet (25 mg) by mouth as needed 32 tablet 11     sulfamethoxazole-trimethoprim (BACTRIM/SEPTRA) 400-80 MG tablet Take 1 tablet by mouth Every Mon, Wed, Fri Morning       tacrolimus (GENERIC EQUIVALENT) 0.5 MG capsule Take 1 capsule (0.5 mg) by mouth 2 times daily Total dose = 2.5mg   capsule 11     tacrolimus (GENERIC EQUIVALENT) 1 MG capsule Take 2 capsules (2 mg) by mouth 2 times daily Total dose= 2.5mg  capsule 11     valGANciclovir (VALCYTE) 450 MG tablet TAKE ONE TABLETS (450MG) BY MOUTH TWO TIMES A DAY 60 tablet 11       Allergies   Allergen Reactions     Heparin (Bovine)      Other reaction(s): Thrombocytopenia, Thromboembolic Event     Heparin Cross Reactors  "Other (See Comments)     HIT positive and AUGUST positive      Oxycodone Other (See Comments)     Significant lethargy, confusion. Tolerates dilaudid well.      Fluocinolone Unknown and Other (See Comments)     Tendon problems  Tendon problems     Levaquin      Pneumococcal Vaccine Other (See Comments)     Other reaction(s): Fever  \"My arm swelled up like a balloon.\"       ROS:  Pertinent positives and negatives are noted in HPI.    EXAM:  /74 (BP Location: Right arm, Patient Position: Sitting, Cuff Size: Adult Regular)   Pulse 65   Temp 97.9  F (36.6  C) (Tympanic)   Resp 16   Ht 1.702 m (5' 7\")   Wt 74.9 kg (165 lb 2 oz)   SpO2 99%   BMI 25.86 kg/m    General appearance: well appearing male, in no acute distress  Respiratory: clear to auscultation bilaterally  Cardiac: RRR with no murmurs  Neuro: Normal sensation to bilateral feet  Dermatological: no rashes or lesions  Psychological: normal affect, alert and pleasant  Results for orders placed or performed in visit on 09/10/19   Hemoglobin A1c   Result Value Ref Range    Hemoglobin A1C 5.3 4.0 - 6.0 %     *Note: Due to a large number of results and/or encounters for the requested time period, some results have not been displayed. A complete set of results can be found in Results Review.     ASSESSMENT AND PLAN:    1. Diabetes mellitus type 2, diet-controlled (H)      A1c obtained today and is at 5.3.  Foot exam is stable.  He will get an eye exam done soon.  He is not taking aspirin per the recommendations of the transplant team.  We will continue with current medications and follow-up in 6 months for diabetic check.      BRANDI Manning CNP..................9/10/2019 8:38 AM      This document was prepared using voice generated software.  While every attempt was made for accuracy, grammatical errors may exist.  "

## 2019-09-10 NOTE — NURSING NOTE
Patient presents to clinic today for a diabetic check. He states he doesn't have any other concerns.     No LMP for male patient.  Medication Reconciliation: complete    Jaleesa Chaparro LPN  9/10/2019 8:38 AM      Previous A1C is at goal of <8  Lab Results   Component Value Date    A1C 5.7 06/04/2019    A1C 12.4 02/24/2019    A1C 7.8 08/22/2018    A1C 6.5 10/06/2014    A1C 5.4 09/12/2013     Urine microalbumin:creatine: 06/04/19  Foot exam DUE  Eye exam DUE    Tobacco User FORMER  Patient is not on a daily aspirin  Patient is on a Statin.  Blood pressure today of:     BP Readings from Last 1 Encounters:   06/04/19 124/78      is at the goal of <139/89 for diabetics.    Jaleesa Chaparro LPN on 9/10/2019 at 8:38 AM

## 2019-09-16 ENCOUNTER — TELEPHONE (OUTPATIENT)
Dept: FAMILY MEDICINE | Facility: OTHER | Age: 64
End: 2019-09-16

## 2019-09-16 NOTE — TELEPHONE ENCOUNTER
Spoke with our in house pharmacy and clarified process to get insulin to patient. Spoke with patient and let up a  process. He is on his way in and will  insulin.   Jaleesa Chaparro LPN...................9/16/2019  1:16 PM

## 2019-09-16 NOTE — TELEPHONE ENCOUNTER
Called patient and he states he canceled the Rx at [facility] and is on his way to [facility] now. Will close encounter.  Miguel Mariscal LPN, LPN ..............9/16/2019 1:16 PM

## 2019-09-18 DIAGNOSIS — Z94.2 LUNG REPLACED BY TRANSPLANT (H): ICD-10-CM

## 2019-09-19 RX ORDER — SULFAMETHOXAZOLE AND TRIMETHOPRIM 400; 80 MG/1; MG/1
1 TABLET ORAL
Qty: 14 TABLET | Refills: 11 | Status: SHIPPED | OUTPATIENT
Start: 2019-09-20 | End: 2020-08-04

## 2019-09-20 DIAGNOSIS — Z94.2 LUNG TRANSPLANT STATUS, BILATERAL (H): ICD-10-CM

## 2019-09-20 RX ORDER — PREDNISONE 5 MG/1
TABLET ORAL
Qty: 45 TABLET | Refills: 12 | Status: SHIPPED | OUTPATIENT
Start: 2019-09-20 | End: 2020-10-07

## 2019-09-24 DIAGNOSIS — T38.0X5A STEROID-INDUCED DIABETES MELLITUS (H): ICD-10-CM

## 2019-09-24 DIAGNOSIS — E09.9 STEROID-INDUCED DIABETES MELLITUS (H): ICD-10-CM

## 2019-10-24 ENCOUNTER — RESULTS ONLY (OUTPATIENT)
Dept: OTHER | Facility: CLINIC | Age: 64
End: 2019-10-24

## 2019-10-24 ENCOUNTER — OFFICE VISIT (OUTPATIENT)
Dept: DERMATOLOGY | Facility: CLINIC | Age: 64
End: 2019-10-24
Attending: INTERNAL MEDICINE
Payer: MEDICARE

## 2019-10-24 ENCOUNTER — ANCILLARY PROCEDURE (OUTPATIENT)
Dept: GENERAL RADIOLOGY | Facility: CLINIC | Age: 64
End: 2019-10-24
Attending: INTERNAL MEDICINE
Payer: MEDICARE

## 2019-10-24 ENCOUNTER — OFFICE VISIT (OUTPATIENT)
Dept: TRANSPLANT | Facility: CLINIC | Age: 64
End: 2019-10-24
Attending: INTERNAL MEDICINE
Payer: MEDICARE

## 2019-10-24 VITALS
BODY MASS INDEX: 25.08 KG/M2 | WEIGHT: 165.5 LBS | HEIGHT: 68 IN | SYSTOLIC BLOOD PRESSURE: 125 MMHG | HEART RATE: 78 BPM | DIASTOLIC BLOOD PRESSURE: 76 MMHG | TEMPERATURE: 98.5 F | OXYGEN SATURATION: 98 %

## 2019-10-24 DIAGNOSIS — L82.1 SEBORRHEIC KERATOSIS: Primary | ICD-10-CM

## 2019-10-24 DIAGNOSIS — Z20.01 CONTACT WITH OR EXPOSURE TO ESCHERICHIA COLI (E. COLI): ICD-10-CM

## 2019-10-24 DIAGNOSIS — Z94.2 LUNG REPLACED BY TRANSPLANT (H): ICD-10-CM

## 2019-10-24 DIAGNOSIS — D22.9 BENIGN NEVUS: ICD-10-CM

## 2019-10-24 DIAGNOSIS — Z85.828 HISTORY OF NONMELANOMA SKIN CANCER: ICD-10-CM

## 2019-10-24 DIAGNOSIS — Z23 NEED FOR INFLUENZA VACCINATION: ICD-10-CM

## 2019-10-24 DIAGNOSIS — Z23 NEED FOR PROPHYLACTIC VACCINATION AND INOCULATION AGAINST CHOLERA ALONE: Primary | ICD-10-CM

## 2019-10-24 DIAGNOSIS — Z79.52 LONG TERM (CURRENT) USE OF SYSTEMIC STEROIDS: ICD-10-CM

## 2019-10-24 LAB
6 MIN WALK (FT): 1450 FT
6 MIN WALK (M): 442 M
ANION GAP SERPL CALCULATED.3IONS-SCNC: 6 MMOL/L (ref 3–14)
BUN SERPL-MCNC: 35 MG/DL (ref 7–30)
CALCIUM SERPL-MCNC: 8.8 MG/DL (ref 8.5–10.1)
CHLORIDE SERPL-SCNC: 108 MMOL/L (ref 94–109)
CO2 SERPL-SCNC: 27 MMOL/L (ref 20–32)
CREAT SERPL-MCNC: 1.25 MG/DL (ref 0.66–1.25)
DLCOUNC-%PRED-PRE: 90 %
DLCOUNC-PRE: 22.52 ML/MIN/MMHG
DLCOUNC-PRED: 24.99 ML/MIN/MMHG
ERV-%PRED-PRE: 99 %
ERV-PRE: 1.11 L
ERV-PRED: 1.12 L
ERYTHROCYTE [DISTWIDTH] IN BLOOD BY AUTOMATED COUNT: 12.6 % (ref 10–15)
EXPTIME-PRE: 6.68 SEC
FEF2575-%PRED-PRE: 142 %
FEF2575-PRE: 3.61 L/SEC
FEF2575-PRED: 2.53 L/SEC
FEFMAX-%PRED-PRE: 116 %
FEFMAX-PRE: 9.64 L/SEC
FEFMAX-PRED: 8.29 L/SEC
FEV1-%PRED-PRE: 117 %
FEV1-PRE: 3.7 L
FEV1FEV6-PRE: 82 %
FEV1FEV6-PRED: 78 %
FEV1FVC-PRE: 82 %
FEV1FVC-PRED: 77 %
FEV1SVC-PRE: 82 %
FEV1SVC-PRED: 70 %
FIFMAX-PRE: 7.57 L/SEC
FRCPLETH-%PRED-PRE: 77 %
FRCPLETH-PRE: 2.75 L
FRCPLETH-PRED: 3.55 L
FVC-%PRED-PRE: 109 %
FVC-PRE: 4.51 L
FVC-PRED: 4.11 L
GFR SERPL CREATININE-BSD FRML MDRD: 59 ML/MIN/{1.73_M2}
GLUCOSE SERPL-MCNC: 85 MG/DL (ref 70–99)
HCT VFR BLD AUTO: 40.2 % (ref 40–53)
HGB BLD-MCNC: 12.8 G/DL (ref 13.3–17.7)
IC-%PRED-PRE: 100 %
IC-PRE: 3.42 L
IC-PRED: 3.39 L
MAGNESIUM SERPL-MCNC: 1.8 MG/DL (ref 1.6–2.3)
MCH RBC QN AUTO: 33.6 PG (ref 26.5–33)
MCHC RBC AUTO-ENTMCNC: 31.8 G/DL (ref 31.5–36.5)
MCV RBC AUTO: 106 FL (ref 78–100)
PLATELET # BLD AUTO: 175 10E9/L (ref 150–450)
POTASSIUM SERPL-SCNC: 4.7 MMOL/L (ref 3.4–5.3)
RBC # BLD AUTO: 3.81 10E12/L (ref 4.4–5.9)
RVPLETH-%PRED-PRE: 66 %
RVPLETH-PRE: 1.64 L
RVPLETH-PRED: 2.48 L
SODIUM SERPL-SCNC: 141 MMOL/L (ref 133–144)
TACROLIMUS BLD-MCNC: 12.1 UG/L (ref 5–15)
TLCPLETH-%PRED-PRE: 91 %
TLCPLETH-PRE: 6.17 L
TLCPLETH-PRED: 6.72 L
TME LAST DOSE: NORMAL H
VA-%PRED-PRE: 94 %
VA-PRE: 5.69 L
VC-%PRED-PRE: 100 %
VC-PRE: 4.53 L
VC-PRED: 4.5 L
WBC # BLD AUTO: 7.7 10E9/L (ref 4–11)

## 2019-10-24 PROCEDURE — 86833 HLA CLASS II HIGH DEFIN QUAL: CPT | Performed by: INTERNAL MEDICINE

## 2019-10-24 PROCEDURE — 86832 HLA CLASS I HIGH DEFIN QUAL: CPT | Performed by: INTERNAL MEDICINE

## 2019-10-24 PROCEDURE — G0463 HOSPITAL OUTPT CLINIC VISIT: HCPCS | Mod: 25,ZF

## 2019-10-24 PROCEDURE — 80048 BASIC METABOLIC PNL TOTAL CA: CPT | Performed by: INTERNAL MEDICINE

## 2019-10-24 PROCEDURE — 90662 IIV NO PRSV INCREASED AG IM: CPT | Mod: ZF | Performed by: INTERNAL MEDICINE

## 2019-10-24 PROCEDURE — 36415 COLL VENOUS BLD VENIPUNCTURE: CPT | Performed by: INTERNAL MEDICINE

## 2019-10-24 PROCEDURE — 80197 ASSAY OF TACROLIMUS: CPT | Performed by: INTERNAL MEDICINE

## 2019-10-24 PROCEDURE — 83735 ASSAY OF MAGNESIUM: CPT | Performed by: INTERNAL MEDICINE

## 2019-10-24 PROCEDURE — 25000128 H RX IP 250 OP 636: Mod: ZF | Performed by: INTERNAL MEDICINE

## 2019-10-24 PROCEDURE — G0008 ADMIN INFLUENZA VIRUS VAC: HCPCS | Mod: ZF

## 2019-10-24 PROCEDURE — 85027 COMPLETE CBC AUTOMATED: CPT | Performed by: INTERNAL MEDICINE

## 2019-10-24 RX ADMIN — INFLUENZA A VIRUS A/MICHIGAN/45/2015 X-275 (H1N1) ANTIGEN (FORMALDEHYDE INACTIVATED), INFLUENZA A VIRUS A/SINGAPORE/INFIMH-16-0019/2016 IVR-186 (H3N2) ANTIGEN (FORMALDEHYDE INACTIVATED), AND INFLUENZA B VIRUS B/MARYLAND/15/2016 BX-69A (A B/COLORADO/6/2017-LIKE VIRUS) ANTIGEN (FORMALDEHYDE INACTIVATED) 0.5 ML: 60; 60; 60 INJECTION, SUSPENSION INTRAMUSCULAR at 10:15

## 2019-10-24 ASSESSMENT — MIFFLIN-ST. JEOR: SCORE: 1505.2

## 2019-10-24 ASSESSMENT — PAIN SCALES - GENERAL
PAINLEVEL: NO PAIN (0)
PAINLEVEL: NO PAIN (0)

## 2019-10-24 NOTE — NURSING NOTE
Post lung transplant follow up  Coordinator not present for visit.  PFTs unchanged, 6 minute walk test - Patient walked (1450 Ft /442 m) in 6 minutes. LLN = (1361 Ft /415 m)  Patient has had seasonal flu vaccine  Plan for Follow-up visit in 6 months with repeat in routine labs in 3 months
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
## 2019-10-24 NOTE — NURSING NOTE
Clinic Administered Medication Documentation    MEDICATION LIST:   Injectable Medication Documentation    Patient was given Influenza. Prior to medication administration, verified patients identity using patient s name and date of birth. Please see MAR and medication order for additional information. Patient instructed to remain in clinic for 15 minutes and report any adverse reaction to staff immediately .      Was entire vial of medication used? Yes  Vial/Syringe: Syringe  Expiration Date:  05/26/2020  Was this medication supplied by the patient? No     Petrona Zuñiga, CMA

## 2019-10-24 NOTE — NURSING NOTE
Chief Complaint   Patient presents with     Skin Check     Aubrey is here today for a yearly skin exam. L Shoulder has a pink scab that he's concerned about. A rough pimple on the left side of his jawline.     GIA CHRISTIE on 10/24/2019 at 12:58 PM
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
## 2019-10-24 NOTE — NURSING NOTE
"Chief Complaint   Patient presents with     RECHECK     S/P Lung Tx     /76 (BP Location: Left arm, Patient Position: Sitting, Cuff Size: Adult Regular)   Pulse 78   Temp 98.5  F (36.9  C) (Oral)   Ht 1.727 m (5' 8\")   Wt 75.1 kg (165 lb 8 oz)   SpO2 98%   BMI 25.16 kg/m              Melva Camacho CMA [facility]    10/24/2019 9:36 AM      "

## 2019-10-24 NOTE — PATIENT INSTRUCTIONS
~~~~~~~~~~~~~~~~~~~~~~~~~    Thoracic Transplant Office phone [phone], fax [phone]    Office Hours 8:30 - 5:00     For after-hours urgent issues, please dial [phone], and ask to speak with the Thoracic Transplant Coordinator  On-Call, pager [phone].  --------------------  To expedite your medication refill(s), please contact your pharmacy and have them fax a refill request to: [phone]  .   *Please allow 3 business days for routine medication refills.  *Please allow 5 business days for controlled substance medication refills.    **For Diabetic medications and supplies refill(s), please contact your pharmacy and have them  Contact your Endocrine team.  --------------------  For scheduling appointments call [phone].  --------------------  Please Note: If you are active on [employer]ila, all future test results will be sent by M[employer]ila message only, and will no longer be called to patient. You may also receive communication directly from your physician.
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
## 2019-10-25 ENCOUNTER — TELEPHONE (OUTPATIENT)
Dept: TRANSPLANT | Facility: CLINIC | Age: 64
End: 2019-10-25

## 2019-10-25 DIAGNOSIS — Z94.2 S/P LUNG TRANSPLANT (H): Chronic | ICD-10-CM

## 2019-10-25 LAB
CMV DNA SPEC NAA+PROBE-ACNC: NORMAL [IU]/ML
CMV DNA SPEC NAA+PROBE-LOG#: NORMAL {LOG_IU}/ML
SPECIMEN SOURCE: NORMAL

## 2019-10-25 RX ORDER — TACROLIMUS 0.5 MG/1
0.5 CAPSULE ORAL EVERY MORNING
Qty: 90 CAPSULE | Refills: 3 | Status: SHIPPED | OUTPATIENT
Start: 2019-10-25 | End: 2020-04-09

## 2019-10-25 RX ORDER — TACROLIMUS 1 MG/1
2 CAPSULE ORAL 2 TIMES DAILY
Qty: 120 CAPSULE | Refills: 11 | Status: SHIPPED | OUTPATIENT
Start: 2019-10-25 | End: 2020-07-02

## 2019-10-25 NOTE — TELEPHONE ENCOUNTER
Tacrolimus level 12.1 at 12 hours, on 10/24/19  Goal 8-10.   Current dose 2.5 mg in AM, 2.5 mg in PM    Dose changed to  2.5 mg in AM, 2 mg in PM   Recheck level in 7-10 days    Discussed with Aubrey Urbano message sent

## 2019-10-25 NOTE — PROGRESS NOTES
I talked with and examined Aubrey Duncan and I agree with the assessment and the plan. THALIA Luz MD.

## 2019-10-29 LAB
DONOR IDENTIFICATION: NORMAL
DSA COMMENTS: NORMAL
DSA PRESENT: NO
DSA TEST METHOD: NORMAL
ORGAN: NORMAL
SA1 CELL: NORMAL
SA1 COMMENTS: NORMAL
SA1 HI RISK ABY: NORMAL
SA1 MOD RISK ABY: NORMAL
SA1 TEST METHOD: NORMAL
SA2 CELL: NORMAL
SA2 COMMENTS: NORMAL
SA2 HI RISK ABY UA: NORMAL
SA2 MOD RISK ABY: NORMAL
SA2 TEST METHOD: NORMAL
UNACCEPTABLE ANTIGEN: NORMAL
UNOS CPRA: 0

## 2019-11-04 ENCOUNTER — HEALTH MAINTENANCE LETTER (OUTPATIENT)
Age: 64
End: 2019-11-04

## 2019-11-04 DIAGNOSIS — Z94.2 S/P LUNG TRANSPLANT (H): Chronic | ICD-10-CM

## 2019-11-04 LAB
ANION GAP SERPL CALCULATED.3IONS-SCNC: 7 MMOL/L (ref 3–14)
BASOPHILS # BLD AUTO: 0 10E9/L (ref 0–0.2)
BASOPHILS NFR BLD AUTO: 0.4 %
BUN SERPL-MCNC: 41 MG/DL (ref 7–25)
CALCIUM SERPL-MCNC: 8.7 MG/DL (ref 8.6–10.3)
CHLORIDE SERPL-SCNC: 107 MMOL/L (ref 98–107)
CO2 SERPL-SCNC: 27 MMOL/L (ref 21–31)
CREAT SERPL-MCNC: 1.22 MG/DL (ref 0.7–1.3)
DIFFERENTIAL METHOD BLD: ABNORMAL
EOSINOPHIL # BLD AUTO: 0.2 10E9/L (ref 0–0.7)
EOSINOPHIL NFR BLD AUTO: 1.7 %
ERYTHROCYTE [DISTWIDTH] IN BLOOD BY AUTOMATED COUNT: 12.9 % (ref 10–15)
GFR SERPL CREATININE-BSD FRML MDRD: 59 ML/MIN/{1.73_M2}
GLUCOSE SERPL-MCNC: 103 MG/DL (ref 70–105)
HCT VFR BLD AUTO: 39.8 % (ref 40–53)
HGB BLD-MCNC: 13 G/DL (ref 13.3–17.7)
IMM GRANULOCYTES # BLD: 0 10E9/L (ref 0–0.4)
IMM GRANULOCYTES NFR BLD: 0.4 %
LYMPHOCYTES # BLD AUTO: 1.9 10E9/L (ref 0.8–5.3)
LYMPHOCYTES NFR BLD AUTO: 21.6 %
MAGNESIUM SERPL-MCNC: 1.7 MG/DL (ref 1.9–2.7)
MCH RBC QN AUTO: 34 PG (ref 26.5–33)
MCHC RBC AUTO-ENTMCNC: 32.7 G/DL (ref 31.5–36.5)
MCV RBC AUTO: 104 FL (ref 78–100)
MONOCYTES # BLD AUTO: 0.5 10E9/L (ref 0–1.3)
MONOCYTES NFR BLD AUTO: 5.1 %
NEUTROPHILS # BLD AUTO: 6.3 10E9/L (ref 1.6–8.3)
NEUTROPHILS NFR BLD AUTO: 70.8 %
PLATELET # BLD AUTO: 184 10E9/L (ref 150–450)
POTASSIUM SERPL-SCNC: 4.5 MMOL/L (ref 3.5–5.1)
RBC # BLD AUTO: 3.82 10E12/L (ref 4.4–5.9)
SODIUM SERPL-SCNC: 141 MMOL/L (ref 134–144)
WBC # BLD AUTO: 9 10E9/L (ref 4–11)

## 2019-11-04 PROCEDURE — 85025 COMPLETE CBC W/AUTO DIFF WBC: CPT | Mod: ZL | Performed by: INTERNAL MEDICINE

## 2019-11-04 PROCEDURE — 83735 ASSAY OF MAGNESIUM: CPT | Mod: ZL | Performed by: INTERNAL MEDICINE

## 2019-11-04 PROCEDURE — 80197 ASSAY OF TACROLIMUS: CPT | Mod: ZL | Performed by: INTERNAL MEDICINE

## 2019-11-04 PROCEDURE — 80048 BASIC METABOLIC PNL TOTAL CA: CPT | Mod: ZL | Performed by: INTERNAL MEDICINE

## 2019-11-04 PROCEDURE — 36415 COLL VENOUS BLD VENIPUNCTURE: CPT | Mod: ZL | Performed by: INTERNAL MEDICINE

## 2019-11-05 LAB
CMV DNA SPEC NAA+PROBE-ACNC: NORMAL [IU]/ML
CMV DNA SPEC NAA+PROBE-LOG#: NORMAL {LOG_IU}/ML
SPECIMEN SOURCE: NORMAL
TACROLIMUS BLD-MCNC: 6.4 UG/L (ref 5–15)
TME LAST DOSE: NORMAL H

## 2019-11-05 NOTE — RESULT ENCOUNTER NOTE
Aubrey, your tacrolimus level is 6.4 at 14.5 hours.  No dose change unless this is a 12 hour level and lab documented the times wrong.  If this is a 14.5 hour level please get a 12 hour trough drawn next week.  Thank you, Jumana

## 2019-11-07 DIAGNOSIS — T38.0X5A STEROID-INDUCED DIABETES MELLITUS (H): Primary | ICD-10-CM

## 2019-11-07 DIAGNOSIS — E09.9 STEROID-INDUCED DIABETES MELLITUS (H): Primary | ICD-10-CM

## 2019-11-12 RX ORDER — LANCING DEVICE/LANCETS
KIT MISCELLANEOUS
Qty: 1 EACH | Refills: 3 | Status: SHIPPED | OUTPATIENT
Start: 2019-11-12 | End: 2024-08-25

## 2019-11-12 NOTE — TELEPHONE ENCOUNTER
[facility] #[id number] sent Rx request for the following:      MICROLET NEXT LANCING DEVICE  Sig: USE AS DIRECTED    Last Written Prescription Date:  blood glucose (ZOHREH MICROLET 2) lancing device 90 each 0 2/28/2019  --   Sig: Device to be used with lancets.     [facility] - [place], [place] - [address]     Last Office Visit: 9/10/19  Future Office visit:      None    Routing refill request to provider for review/approval because:  Drug not on the [facility], P or  Health refill protocol or controlled substance    Per LOV Note, Pt will be due for diabetic follow-up around 3/10/20.    Unable to complete prescription refill per RN Medication Refill Policy. Brooke Nicholas RN .............. 11/12/2019  9:39 AM'

## 2019-11-18 DIAGNOSIS — Z94.2 S/P LUNG TRANSPLANT (H): Chronic | ICD-10-CM

## 2019-11-18 PROCEDURE — 80197 ASSAY OF TACROLIMUS: CPT | Mod: ZL | Performed by: INTERNAL MEDICINE

## 2019-11-19 ENCOUNTER — TELEPHONE (OUTPATIENT)
Dept: TRANSPLANT | Facility: CLINIC | Age: 64
End: 2019-11-19

## 2019-11-19 LAB
TACROLIMUS BLD-MCNC: 6.2 UG/L (ref 5–15)
TME LAST DOSE: 2000 H

## 2019-11-19 NOTE — TELEPHONE ENCOUNTER
Tacrolimus level 6.2 at 12 hours, on 11/18/19  Goal 8-10.   Current dose 2 mg in AM, 2 mg in PM    Dose changed to  2 mg in AM, 2.5 mg in PM   Recheck level in 7-10 days    Discussed with Aubrey Urbano message sent

## 2019-11-19 NOTE — LETTER
PHYSICIAN ORDERS      DATE & TIME ISSUED: 2019 2:42 PM  PATIENT NAME: Aubrey Duncan   : 1955     [facility] MR# [if applicable]: [id number]     DIAGNOSIS:  Lung Transplant  Z94.2, medication monitoring  STANDING LAB ORDERS X ONE YEAR    1. Tacrolimus level via  every 3 months and with every dose change.  2.  CBC w/plts, BMP, Mg, Phos every 3 months.  Process at local lab and fax results.      Any questions please call: Jumana CAMPA  [phone].    Please fax these results to [phone].      .

## 2019-11-25 NOTE — PROGRESS NOTES
[facility]    Pulmonary, Allergy, Critical Care and Sleep Medicine    Follow-up Note  02/28/2019    Assessment and Recommendations:    Aubrey Duncan is a 63 year old male with a history of bilateral lung transplant secondary to alpha 1 antitrypsin deficiency on 9/8/13, DMII, HIT, DVT, and HTN who presented on 2/24/2019 with fevers, weakness and cough from OSH, found to have pseudomonas bacteremia after recent influenza exposure.      1. Pseudomonas bacteremia and pneumonia, influenza A:   -continue Tamiflu 75 mg BID for 10 days  -ciprofloxacin 500 mg BID for 14 day course total   -follow repeat blood cultures   -Add aerobika for sputum clearance (ordered)  -continue Incentive Spirometry      2. Bilateral lung transplant for A1AT in 2013:   -Holding azathioprine due to leukopenia  -home prednisone 5 mg qAM, 2.5 qPM  -Tacrolimus, 2.5 mg BID (goal 10-12), decreased on 2/26  -recheck next level on 3/1 (ordered), if discharged, will need to be checked as outpatient.   -Bactrim for PJP ppx  -valcyte    Okay to discharge from pulmonary prospective. I will message his transplant coordinator regarding tacro level needing to be checked     Seen and discussed with Dr Belinda Emanuel MD  Pulmonary and Critical Care Fellow   Pager [phone]     Subjective, Interval history:   Doing well. Sputum is clearing, less yellow and less copious. No shortness of breath or chest pain. No fevers, chills, night sweats. Appetite okay. No nausea or vomiting. Having loose bowel movements, 3-4 times daily, mild abdominal cramping (cramping is baseline for him).   Objective:   Medications:    atovaquone  1,500 mg Oral Daily     azaTHIOprine  25 mg Oral Daily     calcium carbonate 600 mg-vitamin D 400 units  1 tablet Oral BID w/meals     ciprofloxacin  500 mg Oral Q12H TABITHA     ferrous sulfate  325 mg Oral Daily with breakfast     furosemide  20 mg Oral Daily     insulin aspart  7 Units Subcutaneous QAM AC  lov 11/4/19       insulin aspart  7 Units Subcutaneous Daily with lunch     insulin aspart  7 Units Subcutaneous Daily with supper     insulin aspart  1-10 Units Subcutaneous TID AC     insulin aspart  1-7 Units Subcutaneous At Bedtime     insulin glargine  25 Units Subcutaneous QAM AC     lisinopril  5 mg Oral Daily     metoprolol tartrate  25 mg Oral BID     omeprazole  20 mg Oral BID AC     oseltamivir  75 mg Oral BID     predniSONE  2.5 mg Oral At Bedtime     predniSONE  5 mg Oral Daily     sodium chloride (PF)  3 mL Intracatheter Q8H     tacrolimus  2.5 mg Oral BID IS     valGANciclovir  450 mg Oral BID     acetaminophen, albuterol, IV fluid REPLACEMENT ONLY, glucose **OR** dextrose **OR** glucagon, lidocaine 4%, lidocaine (buffered or not buffered), loperamide, melatonin, naloxone, sodium chloride (PF)    Physical Exam:  Temp:  [95.8  F (35.4  C)-97.7  F (36.5  C)] 96.7  F (35.9  C)  Heart Rate:  [63-85] 63  Resp:  [16] 16  BP: (105-179)/(61-88) 179/88  SpO2:  [96 %-98 %] 98 %    General: sitting up in bed in NAD  HEENT: anicteric, moist mucosa  Chest: right-sided crackles, a bit worse than yesterday. Left clear. Normal WOB on room air.   Cardiac: RRR no murmurs  Abdomen: Soft, flat, non tender, active BS  Extremities: No LE Edema  Neuro: A&Ox3, no focal deficits   Skin: no rash noted    Labs and imaging: All laboratory and imaging data personally reviewed.    Notable for:  No new labs today.

## 2019-11-27 DIAGNOSIS — Z94.2 S/P LUNG TRANSPLANT (H): Chronic | ICD-10-CM

## 2019-11-27 PROCEDURE — 80197 ASSAY OF TACROLIMUS: CPT | Mod: ZL | Performed by: INTERNAL MEDICINE

## 2019-11-27 PROCEDURE — 36415 COLL VENOUS BLD VENIPUNCTURE: CPT | Mod: ZL | Performed by: INTERNAL MEDICINE

## 2019-11-28 LAB
TACROLIMUS BLD-MCNC: 8.4 UG/L (ref 5–15)
TME LAST DOSE: NORMAL H

## 2019-11-29 NOTE — RESULT ENCOUNTER NOTE
Aubrey, your tacrolimus level is now 8.4 and within goal range.  Call with questions.  Thank you! Jumana

## 2019-12-10 DIAGNOSIS — Z94.2 LUNG REPLACED BY TRANSPLANT (H): ICD-10-CM

## 2019-12-10 RX ORDER — AZATHIOPRINE 50 MG/1
25 TABLET ORAL DAILY
Qty: 15 TABLET | Refills: 11 | Status: SHIPPED | OUTPATIENT
Start: 2019-12-10 | End: 2020-12-11

## 2019-12-12 ENCOUNTER — OFFICE VISIT (OUTPATIENT)
Dept: FAMILY MEDICINE | Facility: OTHER | Age: 64
End: 2019-12-12
Attending: NURSE PRACTITIONER
Payer: MEDICARE

## 2019-12-12 VITALS
HEART RATE: 60 BPM | TEMPERATURE: 98.6 F | RESPIRATION RATE: 16 BRPM | WEIGHT: 170 LBS | BODY MASS INDEX: 26.68 KG/M2 | DIASTOLIC BLOOD PRESSURE: 80 MMHG | HEIGHT: 67 IN | SYSTOLIC BLOOD PRESSURE: 120 MMHG

## 2019-12-12 DIAGNOSIS — L98.9 SKIN LESION: ICD-10-CM

## 2019-12-12 DIAGNOSIS — E11.9 DIABETES MELLITUS TYPE 2, DIET-CONTROLLED (H): Primary | ICD-10-CM

## 2019-12-12 LAB — HBA1C MFR BLD: 5.4 % (ref 4–6)

## 2019-12-12 PROCEDURE — G0463 HOSPITAL OUTPT CLINIC VISIT: HCPCS | Mod: 25

## 2019-12-12 PROCEDURE — 17110 DESTRUCTION B9 LES UP TO 14: CPT | Performed by: NURSE PRACTITIONER

## 2019-12-12 PROCEDURE — 99214 OFFICE O/P EST MOD 30 MIN: CPT | Mod: 25 | Performed by: NURSE PRACTITIONER

## 2019-12-12 PROCEDURE — 36415 COLL VENOUS BLD VENIPUNCTURE: CPT | Mod: ZL | Performed by: NURSE PRACTITIONER

## 2019-12-12 PROCEDURE — G0463 HOSPITAL OUTPT CLINIC VISIT: HCPCS

## 2019-12-12 PROCEDURE — 83036 HEMOGLOBIN GLYCOSYLATED A1C: CPT | Mod: ZL | Performed by: NURSE PRACTITIONER

## 2019-12-12 ASSESSMENT — PAIN SCALES - GENERAL: PAINLEVEL: NO PAIN (0)

## 2019-12-12 ASSESSMENT — MIFFLIN-ST. JEOR: SCORE: 1509.74

## 2019-12-12 NOTE — NURSING NOTE
Patient presents to the clinic for a diabetic check.  Medication Reconciliation Completed.  Previous A1C is at goal of <8  Lab Results   Component Value Date    A1C 5.3 09/10/2019    A1C 5.7 06/04/2019    A1C 12.4 02/24/2019    A1C 7.8 08/22/2018    A1C 6.5 10/06/2014     Urine microalbumin:creatine:   Foot exam Last office visit  Eye exam Oct 2019    Tobacco User No  Patient is not on a daily aspirin  Patient is on a Statin.  Blood pressure today of:     BP Readings from Last 1 Encounters:   10/24/19 125/76      is at the goal of <139/89 for diabetics.    Miguel Mariscal LPN on 12/12/2019 at 9:47 AM
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
## 2020-01-27 ENCOUNTER — HOSPITAL ENCOUNTER (EMERGENCY)
Facility: OTHER | Age: 65
Discharge: HOME | End: 2020-01-27
Attending: FAMILY MEDICINE | Admitting: FAMILY MEDICINE
Payer: MEDICARE

## 2020-01-27 ENCOUNTER — APPOINTMENT (OUTPATIENT)
Dept: GENERAL RADIOLOGY | Facility: OTHER | Age: 65
End: 2020-01-27
Attending: FAMILY MEDICINE
Payer: MEDICARE

## 2020-01-27 VITALS
BODY MASS INDEX: 25.9 KG/M2 | DIASTOLIC BLOOD PRESSURE: 56 MMHG | RESPIRATION RATE: 16 BRPM | TEMPERATURE: 97.7 F | HEART RATE: 70 BPM | WEIGHT: 165 LBS | OXYGEN SATURATION: 98 % | SYSTOLIC BLOOD PRESSURE: 142 MMHG | HEIGHT: 67 IN

## 2020-01-27 DIAGNOSIS — R42 DIZZINESS: ICD-10-CM

## 2020-01-27 LAB
ALBUMIN SERPL-MCNC: 3.9 G/DL (ref 3.5–5.7)
ALBUMIN UR-MCNC: NEGATIVE MG/DL
ALP SERPL-CCNC: 42 U/L (ref 34–104)
ALT SERPL W P-5'-P-CCNC: 64 U/L (ref 7–52)
ANION GAP SERPL CALCULATED.3IONS-SCNC: 9 MMOL/L (ref 3–14)
APPEARANCE UR: CLEAR
AST SERPL W P-5'-P-CCNC: 42 U/L (ref 13–39)
BASOPHILS # BLD AUTO: 0.1 10E9/L (ref 0–0.2)
BASOPHILS NFR BLD AUTO: 0.6 %
BILIRUB SERPL-MCNC: 0.3 MG/DL (ref 0.3–1)
BILIRUB UR QL STRIP: NEGATIVE
BUN SERPL-MCNC: 41 MG/DL (ref 7–25)
CALCIUM SERPL-MCNC: 8.8 MG/DL (ref 8.6–10.3)
CHLORIDE SERPL-SCNC: 104 MMOL/L (ref 98–107)
CO2 SERPL-SCNC: 24 MMOL/L (ref 21–31)
COLOR UR AUTO: NORMAL
CREAT SERPL-MCNC: 1.32 MG/DL (ref 0.7–1.3)
DIFFERENTIAL METHOD BLD: ABNORMAL
EOSINOPHIL # BLD AUTO: 0.1 10E9/L (ref 0–0.7)
EOSINOPHIL NFR BLD AUTO: 1.8 %
ERYTHROCYTE [DISTWIDTH] IN BLOOD BY AUTOMATED COUNT: 13 % (ref 10–15)
GFR SERPL CREATININE-BSD FRML MDRD: 54 ML/MIN/{1.73_M2}
GLUCOSE SERPL-MCNC: 111 MG/DL (ref 70–105)
GLUCOSE UR STRIP-MCNC: NEGATIVE MG/DL
HCT VFR BLD AUTO: 39.2 % (ref 40–53)
HGB BLD-MCNC: 12.6 G/DL (ref 13.3–17.7)
HGB UR QL STRIP: NEGATIVE
IMM GRANULOCYTES # BLD: 0.1 10E9/L (ref 0–0.4)
IMM GRANULOCYTES NFR BLD: 0.8 %
INTERPRETATION ECG - MUSE: NORMAL
KETONES UR STRIP-MCNC: NEGATIVE MG/DL
LEUKOCYTE ESTERASE UR QL STRIP: NEGATIVE
LYMPHOCYTES # BLD AUTO: 1.9 10E9/L (ref 0.8–5.3)
LYMPHOCYTES NFR BLD AUTO: 24.4 %
MCH RBC QN AUTO: 33.8 PG (ref 26.5–33)
MCHC RBC AUTO-ENTMCNC: 32.1 G/DL (ref 31.5–36.5)
MCV RBC AUTO: 105 FL (ref 78–100)
MONOCYTES # BLD AUTO: 0.5 10E9/L (ref 0–1.3)
MONOCYTES NFR BLD AUTO: 6.3 %
NEUTROPHILS # BLD AUTO: 5.1 10E9/L (ref 1.6–8.3)
NEUTROPHILS NFR BLD AUTO: 66.1 %
NITRATE UR QL: NEGATIVE
PH UR STRIP: 5 PH (ref 5–7)
PLATELET # BLD AUTO: 179 10E9/L (ref 150–450)
POTASSIUM SERPL-SCNC: 4.6 MMOL/L (ref 3.5–5.1)
PROT SERPL-MCNC: 6.6 G/DL (ref 6.4–8.9)
RBC # BLD AUTO: 3.73 10E12/L (ref 4.4–5.9)
SODIUM SERPL-SCNC: 137 MMOL/L (ref 134–144)
SOURCE: NORMAL
SP GR UR STRIP: 1.02 (ref 1–1.03)
TROPONIN I SERPL-MCNC: 5.2 PG/ML
UROBILINOGEN UR STRIP-MCNC: NORMAL MG/DL (ref 0–2)
WBC # BLD AUTO: 7.7 10E9/L (ref 4–11)

## 2020-01-27 PROCEDURE — 85025 COMPLETE CBC W/AUTO DIFF WBC: CPT | Performed by: FAMILY MEDICINE

## 2020-01-27 PROCEDURE — 81003 URINALYSIS AUTO W/O SCOPE: CPT | Performed by: FAMILY MEDICINE

## 2020-01-27 PROCEDURE — 93005 ELECTROCARDIOGRAM TRACING: CPT | Performed by: FAMILY MEDICINE

## 2020-01-27 PROCEDURE — 84484 ASSAY OF TROPONIN QUANT: CPT | Performed by: FAMILY MEDICINE

## 2020-01-27 PROCEDURE — 99285 EMERGENCY DEPT VISIT HI MDM: CPT | Mod: 25 | Performed by: FAMILY MEDICINE

## 2020-01-27 PROCEDURE — 71046 X-RAY EXAM CHEST 2 VIEWS: CPT

## 2020-01-27 PROCEDURE — 80053 COMPREHEN METABOLIC PANEL: CPT | Performed by: FAMILY MEDICINE

## 2020-01-27 PROCEDURE — 36415 COLL VENOUS BLD VENIPUNCTURE: CPT | Performed by: FAMILY MEDICINE

## 2020-01-27 PROCEDURE — 93010 ELECTROCARDIOGRAM REPORT: CPT | Performed by: INTERNAL MEDICINE

## 2020-01-27 PROCEDURE — 99283 EMERGENCY DEPT VISIT LOW MDM: CPT | Mod: Z6 | Performed by: FAMILY MEDICINE

## 2020-01-27 ASSESSMENT — MIFFLIN-ST. JEOR: SCORE: 1487.07

## 2020-01-27 NOTE — ED AVS SNAPSHOT
[facility]  [address]  [place] [address]  Phone:  [phone]  Fax:  [phone]                                    Aubrey Duncan   MRN: [id number]    Department:  [facility]   Date of Visit:  1/27/2020           After Visit Summary Signature Page    I have received my discharge instructions, and my questions have been answered. I have discussed any challenges I see with this plan with the nurse or doctor.    ..........................................................................................................................................  Patient/Patient Representative Signature      ..........................................................................................................................................  Patient Representative Print Name and Relationship to Patient    ..................................................               ................................................  Date                                   Time    ..........................................................................................................................................  Reviewed by Signature/Title    ...................................................              ..............................................  Date                                               Time          22EPIC Rev 08/18

## 2020-01-27 NOTE — ED TRIAGE NOTES
Pt here by meds 1 into triage, pt states that he stepped out of truck and fell this morning, pt denies any injuries except rt leg pain and an abrasion to rt elbow, pt is feeling dizzy and nauseated, pt has a hx of lung transplant and DM
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
## 2020-01-31 ENCOUNTER — TELEPHONE (OUTPATIENT)
Dept: FAMILY MEDICINE | Facility: OTHER | Age: 65
End: 2020-01-31

## 2020-01-31 NOTE — TELEPHONE ENCOUNTER
Yes this is fine. Continue same dosing. Does he need me to send in a new Rx somewhere? BRANDI Manning CNP on 1/31/2020 at 11:31 AM

## 2020-01-31 NOTE — TELEPHONE ENCOUNTER
States she faxed papers over 2 times. She said the first fax she didn't put the information on it so ignore that one.

## 2020-01-31 NOTE — TELEPHONE ENCOUNTER
Would like to know if pt can change his insulin from Basaglar to Lantus due to a program that can get him prescriptions for free

## 2020-02-05 ENCOUNTER — TELEPHONE (OUTPATIENT)
Dept: FAMILY MEDICINE | Facility: OTHER | Age: 65
End: 2020-02-05

## 2020-02-05 DIAGNOSIS — T38.0X5A STEROID-INDUCED DIABETES MELLITUS (H): ICD-10-CM

## 2020-02-05 DIAGNOSIS — E09.9 STEROID-INDUCED DIABETES MELLITUS (H): ICD-10-CM

## 2020-02-05 NOTE — TELEPHONE ENCOUNTER
Called to let patient know Hoa Olivarez was out of the office today, but returning tomorrow. Patient ok to wait.   Miguel Mariscal LPN,..............2/5/2020 9:28 AM

## 2020-02-05 NOTE — TELEPHONE ENCOUNTER
Pt calling to discuss getting his Rx for his test strips changed from testing 4x daily to 3x daily. States that medicare won't pay for 4x daily.

## 2020-02-16 ENCOUNTER — HEALTH MAINTENANCE LETTER (OUTPATIENT)
Age: 65
End: 2020-02-16

## 2020-02-19 ENCOUNTER — TELEPHONE (OUTPATIENT)
Dept: FAMILY MEDICINE | Facility: OTHER | Age: 65
End: 2020-02-19

## 2020-02-19 ENCOUNTER — RESULTS ONLY (OUTPATIENT)
Dept: OTHER | Facility: CLINIC | Age: 65
End: 2020-02-19

## 2020-02-19 DIAGNOSIS — Z20.01 CONTACT WITH OR EXPOSURE TO ESCHERICHIA COLI (E. COLI): ICD-10-CM

## 2020-02-19 DIAGNOSIS — Z94.2 S/P LUNG TRANSPLANT (H): Chronic | ICD-10-CM

## 2020-02-19 DIAGNOSIS — Z23 NEED FOR INFLUENZA VACCINATION: ICD-10-CM

## 2020-02-19 DIAGNOSIS — Z79.52 LONG TERM (CURRENT) USE OF SYSTEMIC STEROIDS: ICD-10-CM

## 2020-02-19 DIAGNOSIS — Z23 NEED FOR PROPHYLACTIC VACCINATION AND INOCULATION AGAINST CHOLERA ALONE: ICD-10-CM

## 2020-02-19 DIAGNOSIS — E11.9 DIABETES MELLITUS TYPE 2, DIET-CONTROLLED (H): Primary | ICD-10-CM

## 2020-02-19 LAB
ANION GAP SERPL CALCULATED.3IONS-SCNC: 6 MMOL/L (ref 3–14)
BASOPHILS # BLD AUTO: 0 10E9/L (ref 0–0.2)
BASOPHILS NFR BLD AUTO: 0.8 %
BUN SERPL-MCNC: 33 MG/DL (ref 7–25)
CALCIUM SERPL-MCNC: 9.3 MG/DL (ref 8.6–10.3)
CHLORIDE SERPL-SCNC: 107 MMOL/L (ref 98–107)
CO2 SERPL-SCNC: 28 MMOL/L (ref 21–31)
CREAT SERPL-MCNC: 1.22 MG/DL (ref 0.7–1.3)
DIFFERENTIAL METHOD BLD: ABNORMAL
EOSINOPHIL # BLD AUTO: 0.1 10E9/L (ref 0–0.7)
EOSINOPHIL NFR BLD AUTO: 2.1 %
ERYTHROCYTE [DISTWIDTH] IN BLOOD BY AUTOMATED COUNT: 12.5 % (ref 10–15)
GFR SERPL CREATININE-BSD FRML MDRD: 59 ML/MIN/{1.73_M2}
GLUCOSE SERPL-MCNC: 97 MG/DL (ref 70–105)
HCT VFR BLD AUTO: 40.6 % (ref 40–53)
HGB BLD-MCNC: 13 G/DL (ref 13.3–17.7)
IMM GRANULOCYTES # BLD: 0 10E9/L (ref 0–0.4)
IMM GRANULOCYTES NFR BLD: 0.4 %
LYMPHOCYTES # BLD AUTO: 1.6 10E9/L (ref 0.8–5.3)
LYMPHOCYTES NFR BLD AUTO: 29.9 %
MAGNESIUM SERPL-MCNC: 1.6 MG/DL (ref 1.9–2.7)
MCH RBC QN AUTO: 33.7 PG (ref 26.5–33)
MCHC RBC AUTO-ENTMCNC: 32 G/DL (ref 31.5–36.5)
MCV RBC AUTO: 105 FL (ref 78–100)
MONOCYTES # BLD AUTO: 0.3 10E9/L (ref 0–1.3)
MONOCYTES NFR BLD AUTO: 6.6 %
NEUTROPHILS # BLD AUTO: 3.1 10E9/L (ref 1.6–8.3)
NEUTROPHILS NFR BLD AUTO: 60.2 %
PLATELET # BLD AUTO: 176 10E9/L (ref 150–450)
POTASSIUM SERPL-SCNC: 4.6 MMOL/L (ref 3.5–5.1)
RBC # BLD AUTO: 3.86 10E12/L (ref 4.4–5.9)
SODIUM SERPL-SCNC: 141 MMOL/L (ref 134–144)
TACROLIMUS BLD-MCNC: 5.6 UG/L (ref 5–15)
TME LAST DOSE: 2000 H
WBC # BLD AUTO: 5.2 10E9/L (ref 4–11)

## 2020-02-19 PROCEDURE — 80197 ASSAY OF TACROLIMUS: CPT | Mod: ZL | Performed by: INTERNAL MEDICINE

## 2020-02-19 PROCEDURE — 86832 HLA CLASS I HIGH DEFIN QUAL: CPT | Mod: ZL | Performed by: FAMILY MEDICINE

## 2020-02-19 PROCEDURE — 36415 COLL VENOUS BLD VENIPUNCTURE: CPT | Mod: ZL | Performed by: INTERNAL MEDICINE

## 2020-02-19 PROCEDURE — 85025 COMPLETE CBC W/AUTO DIFF WBC: CPT | Mod: ZL | Performed by: INTERNAL MEDICINE

## 2020-02-19 PROCEDURE — 80048 BASIC METABOLIC PNL TOTAL CA: CPT | Mod: ZL | Performed by: INTERNAL MEDICINE

## 2020-02-19 PROCEDURE — 86833 HLA CLASS II HIGH DEFIN QUAL: CPT | Mod: ZL | Performed by: FAMILY MEDICINE

## 2020-02-19 PROCEDURE — 83735 ASSAY OF MAGNESIUM: CPT | Mod: ZL | Performed by: INTERNAL MEDICINE

## 2020-02-19 NOTE — TELEPHONE ENCOUNTER
Received fax from [employer] stating patient needs insulin changed fro basaglar to lantus.     Jaleesa Chaparro LPN...................2/19/2020  3:36 PM

## 2020-02-19 NOTE — TELEPHONE ENCOUNTER
Patient notified his last A1C in December was good and he should have it repeated in June.    Yohana Barreto LPN on 2/19/2020 at 10:03 AM

## 2020-02-19 NOTE — TELEPHONE ENCOUNTER
Aubrey Disla came in to have lab work done today and stated that he was supposed to be having an A1C done for you as well. If you do indeed want that I drew extra blood and we will have it for 48 hours. Thank you!

## 2020-02-20 ENCOUNTER — TELEPHONE (OUTPATIENT)
Dept: TRANSPLANT | Facility: CLINIC | Age: 65
End: 2020-02-20

## 2020-02-20 NOTE — TELEPHONE ENCOUNTER
Tacrolimus level 5.6 at 12 hours, on 2/19/2020  Goal 8-10.     Patient states he did not take tacrolimus dose the night before  Patient to get level next week.     Discussed with patient   MyChart message sent

## 2020-02-21 NOTE — RESULT ENCOUNTER NOTE
See telephone encounter from 2/20/2020, pt didn't take evening dose of tacro prior to draw, so planning to repeat 12 hour level next week.

## 2020-02-28 DIAGNOSIS — Z94.2 S/P LUNG TRANSPLANT (H): Chronic | ICD-10-CM

## 2020-02-28 PROCEDURE — 80197 ASSAY OF TACROLIMUS: CPT | Mod: ZL | Performed by: INTERNAL MEDICINE

## 2020-02-29 LAB
TACROLIMUS BLD-MCNC: 8.5 UG/L (ref 5–15)
TME LAST DOSE: NORMAL H

## 2020-03-02 NOTE — RESULT ENCOUNTER NOTE
Saulo Burgos, your tacrolimus level is 8.5 and at goal.  No dose changes.  Call me with questions.  Thank you  Jumana

## 2020-03-17 NOTE — PLAN OF CARE
"Assumed cares [id number]     /80 (BP Location: Right arm)   Pulse 83   Temp 97.7  F (36.5  C) (Oral)   Resp 16   Ht 1.702 m (5' 7\")   Wt 70.9 kg (156 lb 4.8 oz)   SpO2 98%   BMI 24.48 kg/m       Pain: denies  Neuro: A&O x4  Respiratory: crackles in RML RLL lung  Cardiac/Neurovascular: WDL  GI/: adequate urine output, chronic diarrhea, BM x2 loose, watery, & yellow  Nutrition: regular diet, carb count, 100% intake, good appetite  Activity: independent  Skin: no concerns   Lines: PIV left lower forearm, saline locked  Events this shift: Diabetic, B @1659, 97 @1726 after 1 apple juice, 136 @1817 after 1 more apple juice. Taking prn Loperamide 4x daily for chronic diarrhea, last dose 1900. Scheduled antibiotic Ciprofloxacin administered.     Plan: continue to monitor and follow POC  " Called pt and let him know and canceled his appt for next week

## 2020-03-18 ENCOUNTER — HOSPITAL ENCOUNTER (OUTPATIENT)
Dept: GENERAL RADIOLOGY | Facility: OTHER | Age: 65
End: 2020-03-18
Attending: FAMILY MEDICINE
Payer: MEDICARE

## 2020-03-18 ENCOUNTER — OFFICE VISIT (OUTPATIENT)
Dept: FAMILY MEDICINE | Facility: OTHER | Age: 65
End: 2020-03-18
Attending: FAMILY MEDICINE
Payer: MEDICARE

## 2020-03-18 VITALS
DIASTOLIC BLOOD PRESSURE: 64 MMHG | WEIGHT: 165 LBS | OXYGEN SATURATION: 99 % | HEART RATE: 80 BPM | BODY MASS INDEX: 25.84 KG/M2 | TEMPERATURE: 97.2 F | RESPIRATION RATE: 20 BRPM | SYSTOLIC BLOOD PRESSURE: 108 MMHG

## 2020-03-18 DIAGNOSIS — S22.31XA CLOSED FRACTURE OF ONE RIB OF RIGHT SIDE, INITIAL ENCOUNTER: ICD-10-CM

## 2020-03-18 DIAGNOSIS — R07.89 OTHER CHEST PAIN: ICD-10-CM

## 2020-03-18 DIAGNOSIS — R07.89 OTHER CHEST PAIN: Primary | ICD-10-CM

## 2020-03-18 PROCEDURE — 99213 OFFICE O/P EST LOW 20 MIN: CPT | Performed by: FAMILY MEDICINE

## 2020-03-18 PROCEDURE — G0463 HOSPITAL OUTPT CLINIC VISIT: HCPCS

## 2020-03-18 PROCEDURE — 71101 X-RAY EXAM UNILAT RIBS/CHEST: CPT | Mod: RT

## 2020-03-18 ASSESSMENT — PAIN SCALES - GENERAL: PAINLEVEL: MODERATE PAIN (5)

## 2020-03-18 ASSESSMENT — PATIENT HEALTH QUESTIONNAIRE - PHQ9: SUM OF ALL RESPONSES TO PHQ QUESTIONS 1-9: 0

## 2020-03-18 NOTE — NURSING NOTE
Patient here for right side pain after a fall on the ice 03/09/2020. Medication Reconciliation: complete.    Latesha Chavez LPN  3/18/2020 11:30 AM

## 2020-03-19 NOTE — PROGRESS NOTES
Chest pain fall  SUBJECTIVE:   Aubrey Duncan is a 64 year old male who presents to clinic today for the following health issues: Chest pain fall    Patient arrives here for chest pain and fall.  He is concerned about fractured ribs.  He had fallen on the 16th on the right side.  He had slipped on some black ice.        Patient Active Problem List    Diagnosis Date Noted     Pneumonia of right lower lobe due to Pseudomonas species (H) 02/28/2019     Priority: Medium     Sepsis associated hypotension (H) 02/24/2019     Priority: Medium     Gastroesophageal reflux disease, esophagitis presence not specified 06/14/2018     Priority: Medium     Diverticulosis of large intestine without hemorrhage 06/14/2018     Priority: Medium     Essential hypertension 06/14/2018     Priority: Medium     Chronic obstructive pulmonary disease (H) 01/31/2018     Priority: Medium     Overview:   Severe, 2/2 alpha-1-antitrypsin deficiency; as of 2012 on active list for B lung transplant.       Contact dermatitis and eczema 01/31/2018     Priority: Medium     Esophageal reflux 01/31/2018     Priority: Medium     Gout 01/31/2018     Priority: Medium     Seborrheic keratosis 01/31/2018     Priority: Medium     Ureteral stone 10/17/2017     Priority: Medium     Wound of lower extremity 10/03/2017     Priority: Medium     Laceration of skin 08/18/2017     Priority: Medium     Diabetes mellitus type 2, diet-controlled (H) 05/11/2017     Priority: Medium     Osteopenia 05/11/2017     Priority: Medium     Male erectile dysfunction, unspecified 04/26/2016     Priority: Medium     Infected wound 09/20/2014     Priority: Medium     CMV (cytomegalovirus infection) (H) 10/17/2013     Priority: Medium     Overview:   donor-related       Prophylactic antibiotic 10/17/2013     Priority: Medium     Encounter for long-term current use of medication 10/10/2013     Priority: Medium     Problem list name updated by automated process. 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 Provider to review and confirm.         DVT of upper extremity (deep vein thrombosis) (H) 09/01/2013     Priority: Medium     Nonocclusive thrombosis extending from the right subclavian vein to the right axillary vein,  Segmental occlusion of right basilic vein in the upper arm. Treated with Argatroban and then Fondaparinux due to HIT       Acute venous embolism and thrombosis of deep veins of upper extremity (H) 09/01/2013     Priority: Medium     Overview:   Overview:   Nonocclusive thrombosis extending from the right subclavian vein to the right axillary vein,  Segmental occlusion of right basilic vein in the upper arm. Treated with Argatroban and then Fondaparinux due to HIT       Thoracic back pain 06/19/2013     Priority: Medium     Thoracic segment dysfunction 06/19/2013     Priority: Medium     Alpha-1-antitrypsin deficiency (H)      Priority: Medium     Coronary atherosclerosis 07/01/2002     Priority: Medium     Overview:   Focal; no hemodynamically significant lesions       Past Medical History:   Diagnosis Date     Alpha-1-antitrypsin deficiency (H)      Basal cell carcinoma      CMV (cytomegalovirus infection) (H)     Reacttivation Sept 2013 when valcyte held     DVT of upper extremity (deep vein thrombosis) (H) Sept 2013    Nonocclusive thrombosis extending from the right subclavian vein to the right axillary vein,  Segmental occlusion of right basilic  vein in the upper arm. 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  Physical Exam  Constitutional:       Appearance: Normal appearance.   Cardiovascular:      Pulses: Normal pulses.   Pulmonary:      Effort: Pulmonary effort is normal.      Breath sounds: Normal breath sounds.   Chest:      Chest wall: Tenderness present.   Abdominal:      General: Abdomen is flat.   Neurological:      Mental Status: He is alert.         Diagnostic Test Results:  none     ASSESSMENT/PLAN:         1. Other chest pain  On my exam I did not see x-rays but report does indicate he has a number of rib fractures.  Patient does report  - XR Ribs & Chest Rt 3vw; Future    2. Closed fracture of one rib of right side, initial encounter  Patient does report he has Dilaudid at home.  Recommended he take about 1 mg in addition with ibuprofen.  Will inform him of the results.        Osvaldo Cohen MD  [facility]

## 2020-04-06 ENCOUNTER — TELEPHONE (OUTPATIENT)
Dept: TRANSPLANT | Facility: CLINIC | Age: 65
End: 2020-04-06

## 2020-04-06 NOTE — TELEPHONE ENCOUNTER
Patient Call: General    Reason for call: patient called regarding his upcoming appointment if he will need to cancel or if it can be a phone appt.    Call back needed? Yes    Return Call Needed  Same as documented in contacts section  When to return call?: Greater than one day: Route standard priority

## 2020-04-06 NOTE — TELEPHONE ENCOUNTER
Spoke with patient, he reports doing well, no issues with his breathing. Will make appointment next week a phone visit (patient doesn't think he'd be able to figure out video so would prefer phone call) next week and notified patient to expect to hear from a  later this week to confirm. He agrees with plan.

## 2020-04-09 DIAGNOSIS — E11.9 DIABETES MELLITUS TYPE 2, DIET-CONTROLLED (H): ICD-10-CM

## 2020-04-09 DIAGNOSIS — Z94.2 S/P LUNG TRANSPLANT (H): Chronic | ICD-10-CM

## 2020-04-09 RX ORDER — TACROLIMUS 0.5 MG/1
CAPSULE ORAL
Qty: 30 CAPSULE | Refills: 3 | Status: SHIPPED | OUTPATIENT
Start: 2020-04-09 | End: 2020-08-04

## 2020-04-09 NOTE — TELEPHONE ENCOUNTER
Spoke with patient's insurance and filed an exception to get Lantus covered. Exception was approved and it good until 04/09/2021. Case # [id number]. Patient is needing refill.     Jaleesa Chaparro LPN...................4/9/2020  9:32 AM

## 2020-04-16 ENCOUNTER — VIRTUAL VISIT (OUTPATIENT)
Dept: TRANSPLANT | Facility: CLINIC | Age: 65
End: 2020-04-16
Attending: INTERNAL MEDICINE
Payer: MEDICARE

## 2020-04-16 DIAGNOSIS — E11.9 DIABETES MELLITUS TYPE 2, DIET-CONTROLLED (H): ICD-10-CM

## 2020-04-16 DIAGNOSIS — Z79.52 LONG TERM (CURRENT) USE OF SYSTEMIC STEROIDS: ICD-10-CM

## 2020-04-16 DIAGNOSIS — E09.9 STEROID-INDUCED DIABETES MELLITUS (H): ICD-10-CM

## 2020-04-16 DIAGNOSIS — M85.9 LOW BONE DENSITY: ICD-10-CM

## 2020-04-16 DIAGNOSIS — E88.01 ALPHA-1-ANTITRYPSIN DEFICIENCY (H): Chronic | ICD-10-CM

## 2020-04-16 DIAGNOSIS — K57.30 DIVERTICULOSIS OF LARGE INTESTINE WITHOUT HEMORRHAGE: ICD-10-CM

## 2020-04-16 DIAGNOSIS — T38.0X5A STEROID-INDUCED DIABETES MELLITUS (H): ICD-10-CM

## 2020-04-16 DIAGNOSIS — K21.9 GASTROESOPHAGEAL REFLUX DISEASE, ESOPHAGITIS PRESENCE NOT SPECIFIED: ICD-10-CM

## 2020-04-16 DIAGNOSIS — Z79.899 ENCOUNTER FOR LONG-TERM CURRENT USE OF MEDICATION: ICD-10-CM

## 2020-04-16 DIAGNOSIS — Z94.2 S/P LUNG TRANSPLANT (H): Primary | Chronic | ICD-10-CM

## 2020-04-16 ASSESSMENT — PAIN SCALES - GENERAL: PAINLEVEL: NO PAIN (0)

## 2020-04-16 NOTE — NURSING NOTE
Transplant Coordinator Note    Reason for visit: Post lung transplant follow up visit   Coordinator: Present at telephone visit  Caregiver:  Wife Kyung     Health concerns addressed today:  1. Breathing--stable  2. DM--  3. 3/18/20 approx slipped on ice and fell.  Broke right ribs.  Also in Feb 2020 fell out of the  due to leg cramp and not being able to straighten out leg.   Had nausea afterwards and [facility] called and brought him to ER.  Unclear etiology.  No recurrent symptoms.    Activity:   Very active  Oxygen needs:   none    Pain management:     Diabetic management:     Pt Education: medications (use/dose/side effects), how/when to call coordinator, frequency of labs, s/s of infection/rejection, call prior to starting any new medications, lab/vital sign book     Labs, CXR, PFTs reviewed with patient  Medication record reviewed and reconciled  Questions and concerns addressed    Health Maintenance:         PATIENT INSTRUCTIONS:    1. Continue to hydrate with 60-70 oz fluids daily.  2. Continue to exercise daily or most days of the week.  3. Follow up with your primary care provider for annual gender health maintenance procedures.  4.  Monitor blood glucoses and follow up as needed.    Next transplant clinic appointment:  6 months with CXR, dexascan, fasting labs and full PFTs with 6 minute walk--annual visit.  Next lab draw: May 2020 + EBV PCR       AVS printed at time of check out

## 2020-04-16 NOTE — PATIENT INSTRUCTIONS
PATIENT INSTRUCTIONS:    1. Continue to hydrate with 60-70 oz fluids daily.  2. Continue to exercise daily or most days of the week.  3. Follow up with your primary care provider for annual gender health maintenance procedures.  4.  Monitor blood glucoses and follow up as needed.      Next transplant clinic appointment:  6 months with CXR, dexascan, fasting labs and full PFTs with 6 minute walk--annual visit.    Next lab draw: May 2020

## 2020-04-16 NOTE — PROGRESS NOTES
"Service Date: 2020      OUTPATIENT TELEPHONE VISIT      HISTORY OF PRESENT ILLNESS:  Mr. Hamlin reports that his breathing has continued to be excellent.  He is not having shortness of breath with his daily activities.  He is not having productive cough, wheezing, hemoptysis.  He fell on the ice on 2020 and fractured 3 ribs; the pain from that injury is improving gradually.      REVIEW OF SYSTEMS:   1.  Fasting blood sugars are usually between 75 and 90 except when he has a \"treat\" the night before.   2.  Blood pressures are usually 145-155/80 before he takes his meds in the morning; 120-130/70 in the afternoon.   3.  Appetite is good and weight is stable.   4.  He had a mild tear of the skin on his right lower extremity in March and that has healed.   5.  Complete review of systems was asked and was otherwise negative.      ASSESSMENT AND PLAN:     1.  Status post bilateral lung transplant, performed in 2013 for alpha-1 antitrypsin deficiency emphysema.  He is doing extremely well symptomatically.  Chest x-ray and pulmonary function tests were deferred at this time because of the ongoing COVID-19 pandemic.  His current immune suppressive medications are tacrolimus, azathioprine and prednisone, which will be continued.   2.  Antimicrobial prophylaxis is limited to Bactrim 3 times a week.   3.  Diabetes, under good control.   4.  Hypertension, being managed with lisinopril and metoprolol.   5.  History of recurrent CMV viremia, being managed with long-term valganciclovir 450 mg p.o. twice a week, which will be continued.   6.  History of squamous cell carcinoma on the left forearm treated with Mohs surgery in 2016.  He continues to follow up regularly with Dermatology.      The patient will return to clinic in 6 months.         JORGE HAYDEN MD             D: 2020   T: 2020   MT: shahida      Name:     YARED HAMLIN   MRN:      [id number]        Account:      [id number]   :      " 1955           Service Date: 04/16/2020      Document: [id number]

## 2020-04-16 NOTE — PROGRESS NOTES
[facility]  Pulmonary Medicine/Lung Transplant  April 17, 2020         Today's visit note:       ASSESSMENT/PLAN:    1.  Status post bilateral lung transplant, performed in 09/2013 for alpha-1 antitrypsin deficiency emphysema.  He is doing extremely well symptomatically.  Chest x-ray and pulmonary function tests were deferred at this time because of the ongoing COVID-19 pandemic.  His current immune suppressive medications are tacrolimus, azathioprine and prednisone, which will be continued.   2.  Antimicrobial prophylaxis is limited to Bactrim 3 times a week.   3.  Diabetes, under good control.   4.  Hypertension, being managed with lisinopril and metoprolol.   5.  History of recurrent CMV viremia, being managed with long-term valganciclovir 450 mg p.o. twice a week, which will be continued.   6.  History of squamous cell carcinoma on the left forearm treated with Mohs surgery in 06/2016.  He continues to follow up regularly with Dermatology.      The patient will return to clinic in 6 months.   Please also refer to RN Transplant Coordinator note for additional information related to this visit.    PATIENT PROFILE AND TRANSPLANT HISTORY:  Current age:                    64 year old  Underlying lung disease: Alpha - 1 - Antitrypsin Deficiency    Transplant date(s):        9/8/2013 (Lung)  Transplant POD(s):        2413  Lung transplant type(s): Bilateral Sequential Lung      Transplant coordinator:   Arcelia Byrne  Transplant provider(s):   Kirk Broussard    MOST RECENT:    --Lung transplant clinic visit: 3/14/19 ([facility])  --Bronch/tbbx: >1 year  --PRA: 2/19/20, neg for DSA  --CMV PCR: 2/19/20, neg for CMV  --EBV PCR: >1 year      ALLERGIES/INTOLERANCES/SENSITIVITIES  Heparin (bovine); Heparin cross reactors; Oxycodone; Fluocinolone; Levaquin; and Pneumococcal vaccine    Active Patient Thresholds     Lab Low High Effective Since Comment    Tacrolimus Level 8 10 03/14/2019  3/14/19  "         INTERVAL HISTORY    Mr. Duncan reports that his breathing has continued to be excellent.  He is not having shortness of breath with his daily activities.  He is not having productive cough, wheezing, hemoptysis.  He fell on the ice on 03/18/2020 and fractured 3 ribs; the pain from that injury is improving gradually. CXR was done and there was no pneumothorax.     REVIEW OF SYSTEMS:   1.  Fasting blood sugars are usually between 75 and 90 except when he has a \"treat\" the night before.   2.  Blood pressures are usually 145-155/80 before he takes his meds in the morning; 120-130/70 in the afternoon.   3.  Appetite is good and weight is stable.   4.  He had a mild tear of the skin on his right lower extremity in March and that has healed.   5.  Complete review of systems was asked and was otherwise negative.     I have reviewed and updated the patient's Past Medical History, Social History, Family History and Medication List.      ATTESTATION    Aubrey Duncan is a 64 year old male who is having lung transplant follow-up via a billable telephone visit.     I have reviewed the note as documented above.  This accurately captures the substance of my conversation with the patient.    Phone call contact time  Call Started at  9:36am  Call Ended at 9:49am    Kirk Broussard MD  [facility]    The patient has been notified of following: \"This telephone visit will be conducted via a call between you and your physician/provider. We have found that certain health care needs can be provided without the need for a physical exam.  This service lets us provide the care you need with a short phone conversation.  If a prescription is necessary we can send it directly to your pharmacy.  If lab work is needed we can place an order for that and you can then stop by our lab to have the test done at a later time. If during the course of the call the physician/provider feels a telephone visit is not appropriate, you will not be charged " "for this service.\"       "

## 2020-04-16 NOTE — LETTER
4/16/2020      RE: Aubrey Duncan  [address]  [facility] [address]           [facility]  Pulmonary Medicine/Lung Transplant  April 17, 2020         Today's visit note:       ASSESSMENT/PLAN:    1.  Status post bilateral lung transplant, performed in 09/2013 for alpha-1 antitrypsin deficiency emphysema.  He is doing extremely well symptomatically.  Chest x-ray and pulmonary function tests were deferred at this time because of the ongoing COVID-19 pandemic.  His current immune suppressive medications are tacrolimus, azathioprine and prednisone, which will be continued.   2.  Antimicrobial prophylaxis is limited to Bactrim 3 times a week.   3.  Diabetes, under good control.   4.  Hypertension, being managed with lisinopril and metoprolol.   5.  History of recurrent CMV viremia, being managed with long-term valganciclovir 450 mg p.o. twice a week, which will be continued.   6.  History of squamous cell carcinoma on the left forearm treated with Mohs surgery in 06/2016.  He continues to follow up regularly with Dermatology.      The patient will return to clinic in 6 months.   Please also refer to RN Transplant Coordinator note for additional information related to this visit.    PATIENT PROFILE AND TRANSPLANT HISTORY:  Current age:                    64 year old  Underlying lung disease: Alpha - 1 - Antitrypsin Deficiency    Transplant date(s):        9/8/2013 (Lung)  Transplant POD(s):        2413  Lung transplant type(s): Bilateral Sequential Lung      Transplant coordinator:   Arcelia Byrne  Transplant provider(s):   Kirk Broussard    MOST RECENT:    --Lung transplant clinic visit: 3/14/19 ([facility])  --Bronch/tbbx: >1 year  --PRA: 2/19/20, neg for DSA  --CMV PCR: 2/19/20, neg for CMV  --EBV PCR: >1 year      ALLERGIES/INTOLERANCES/SENSITIVITIES  Heparin (bovine); Heparin cross reactors; Oxycodone; Fluocinolone; Levaquin; and Pneumococcal vaccine    Active Patient Thresholds     Lab Low High  "Effective Since Comment    Tacrolimus Level 8 10 03/14/2019 [facility] 3/14/19          INTERVAL HISTORY    Mr. Duncan reports that his breathing has continued to be excellent.  He is not having shortness of breath with his daily activities.  He is not having productive cough, wheezing, hemoptysis.  He fell on the ice on 03/18/2020 and fractured 3 ribs; the pain from that injury is improving gradually. CXR was done and there was no pneumothorax.     REVIEW OF SYSTEMS:   1.  Fasting blood sugars are usually between 75 and 90 except when he has a \"treat\" the night before.   2.  Blood pressures are usually 145-155/80 before he takes his meds in the morning; 120-130/70 in the afternoon.   3.  Appetite is good and weight is stable.   4.  He had a mild tear of the skin on his right lower extremity in March and that has healed.   5.  Complete review of systems was asked and was otherwise negative.     I have reviewed and updated the patient's Past Medical History, Social History, Family History and Medication List.      ATTESTATION    Aubrey Duncan is a 64 year old male who is having lung transplant follow-up via a billable telephone visit.     I have reviewed the note as documented above.  This accurately captures the substance of my conversation with the patient.    Phone call contact time  Call Started at  9:36am  Call Ended at 9:49am    Kirk Broussard MD  [facility]    The patient has been notified of following: \"This telephone visit will be conducted via a call between you and your physician/provider. We have found that certain health care needs can be provided without the need for a physical exam.  This service lets us provide the care you need with a short phone conversation.  If a prescription is necessary we can send it directly to your pharmacy.  If lab work is needed we can place an order for that and you can then stop by our lab to have the test done at a later time. If during the course of the call the " "physician/provider feels a telephone visit is not appropriate, you will not be charged for this service.\"         Service Date: 04/16/2020      OUTPATIENT TELEPHONE VISIT      HISTORY OF PRESENT ILLNESS:  Mr. Duncan reports that his breathing has continued to be excellent.  He is not having shortness of breath with his daily activities.  He is not having productive cough, wheezing, hemoptysis.  He fell on the ice on 03/18/2020 and fractured 3 ribs; the pain from that injury is improving gradually.      REVIEW OF SYSTEMS:   1.  Fasting blood sugars are usually between 75 and 90 except when he has a \"treat\" the night before.   2.  Blood pressures are usually 145-155/80 before he takes his meds in the morning; 120-130/70 in the afternoon.   3.  Appetite is good and weight is stable.   4.  He had a mild tear of the skin on his right lower extremity in March and that has healed.   5.  Complete review of systems was asked and was otherwise negative.      ASSESSMENT AND PLAN:     1.  Status post bilateral lung transplant, performed in 09/2013 for alpha-1 antitrypsin deficiency emphysema.  He is doing extremely well symptomatically.  Chest x-ray and pulmonary function tests were deferred at this time because of the ongoing COVID-19 pandemic.  His current immune suppressive medications are tacrolimus, azathioprine and prednisone, which will be continued.   2.  Antimicrobial prophylaxis is limited to Bactrim 3 times a week.   3.  Diabetes, under good control.   4.  Hypertension, being managed with lisinopril and metoprolol.   5.  History of recurrent CMV viremia, being managed with long-term valganciclovir 450 mg p.o. twice a week, which will be continued.   6.  History of squamous cell carcinoma on the left forearm treated with Mohs surgery in 06/2016.  He continues to follow up regularly with Dermatology.      The patient will return to clinic in 6 months.         JORGE HAYDEN MD             D: 04/16/2020   T: " 2020   MT: shahida      Name:     YARED HAMLIN   MRN:      [id number]        Account:      [id number]   :      1955           Service Date: 2020      Document: [id number]       Kirk Broussard MD

## 2020-04-17 NOTE — LETTER
PHYSICIAN ORDERS      DATE & TIME ISSUED: 2020 11:49 AM  PATIENT NAME: Aubrey Duncan   : 1955     [facility] MR# [if applicable]: [id number]     DIAGNOSIS:  Lung Transplant  Z94.2, EBV viremia  Please draw EBV DNA quant (PCR) with next lab draw.        Any questions please call: Transplant office [phone]    Please fax these results to [phone].      .

## 2020-04-20 ENCOUNTER — MYC MEDICAL ADVICE (OUTPATIENT)
Dept: FAMILY MEDICINE | Facility: OTHER | Age: 65
End: 2020-04-20

## 2020-04-30 DIAGNOSIS — E09.9 STEROID-INDUCED DIABETES MELLITUS (H): Primary | ICD-10-CM

## 2020-04-30 DIAGNOSIS — T38.0X5A STEROID-INDUCED DIABETES MELLITUS (H): Primary | ICD-10-CM

## 2020-05-01 NOTE — TELEPHONE ENCOUNTER
"[facility] sent Rx request for the following:      insulin pen needle (32G X 4 MM) 32G X 4 MM miscellaneous      Sig: Use 4 pen needles daily or as directed. Dispense as insurance allows. Dx. Code: E09.9    Last Prescription Date:   9/26/19  Last Fill Qty/Refills:         400, R-1    Last Office Visit:                3/18/20, 12/12/19- last diabetic check-up.  Future Office visit:           None.    Per review of chart, it was noted on 2/19/20 that last A1C in December looked \"great at 5.4\" and should repeat in June of 2020. Pt was reminded of this on 4/24/20.    Prescription approved per [facility] [place] for 90 day imelda refill. Brooke Nicholas RN .............. 5/1/2020  10:27 AM      "

## 2020-05-12 DIAGNOSIS — N52.9 ED (ERECTILE DYSFUNCTION): ICD-10-CM

## 2020-05-12 RX ORDER — SILDENAFIL 25 MG/1
25 TABLET, FILM COATED ORAL PRN
Qty: 32 TABLET | Refills: 11 | Status: SHIPPED | OUTPATIENT
Start: 2020-05-12 | End: 2020-05-13

## 2020-05-13 RX ORDER — SILDENAFIL 25 MG/1
25 TABLET, FILM COATED ORAL PRN
Qty: 32 TABLET | Refills: 11 | Status: SHIPPED | OUTPATIENT
Start: 2020-05-13 | End: 2020-05-18

## 2020-05-18 DIAGNOSIS — N52.9 ED (ERECTILE DYSFUNCTION): ICD-10-CM

## 2020-05-18 RX ORDER — SILDENAFIL 25 MG/1
25 TABLET, FILM COATED ORAL PRN
Qty: 32 TABLET | Refills: 11 | Status: SHIPPED | OUTPATIENT
Start: 2020-05-18 | End: 2020-05-18

## 2020-05-18 RX ORDER — SILDENAFIL 25 MG/1
25 TABLET, FILM COATED ORAL PRN
Qty: 32 TABLET | Refills: 11 | Status: SHIPPED | OUTPATIENT
Start: 2020-05-18 | End: 2023-09-13

## 2020-05-20 ENCOUNTER — TELEPHONE (OUTPATIENT)
Dept: FAMILY MEDICINE | Facility: OTHER | Age: 65
End: 2020-05-20

## 2020-05-20 NOTE — TELEPHONE ENCOUNTER
Pharmacy has located the lantus, patient will be in at 9 am tomorrow to . Pharmacy will bring up to unit 4 in the AM.  Selena Yao LPN ...... 5/20/2020 2:08 PM

## 2020-05-20 NOTE — TELEPHONE ENCOUNTER
I called the inpatient pharmacy and retail pharmacy and they have not seen this, tracking per patient said was delivered yesterday.  Have you seen it?  Selena Yao LPN ...... 5/20/2020 12:06 PM

## 2020-06-05 DIAGNOSIS — I10 ESSENTIAL HYPERTENSION: ICD-10-CM

## 2020-06-05 DIAGNOSIS — Z94.2 LUNG TRANSPLANT STATUS, BILATERAL (H): ICD-10-CM

## 2020-06-05 RX ORDER — LISINOPRIL 5 MG/1
5 TABLET ORAL DAILY
Qty: 90 TABLET | Refills: 4 | Status: SHIPPED | OUTPATIENT
Start: 2020-06-05 | End: 2021-06-21

## 2020-06-05 RX ORDER — METOPROLOL TARTRATE 25 MG/1
25 TABLET, FILM COATED ORAL 2 TIMES DAILY
Qty: 180 TABLET | Refills: 4 | Status: SHIPPED | OUTPATIENT
Start: 2020-06-05 | End: 2021-06-21

## 2020-06-05 RX ORDER — FUROSEMIDE 20 MG
20 TABLET ORAL DAILY
Qty: 90 TABLET | Refills: 4 | Status: SHIPPED | OUTPATIENT
Start: 2020-06-05 | End: 2021-06-21

## 2020-06-05 NOTE — TELEPHONE ENCOUNTER
[facility] sent Rx request for the following:      metoprolol tartrate (LOPRESSOR) 25 MG tablet   Sig: Take 1 tablet (25 mg) by mouth 2 times daily       Last Prescription Date:   6/4/2019  Last Fill Qty/Refills:         180, R-4    Last Office Visit:              3/18/2020   Future Office visit:           none       furosemide (LASIX) 20 MG tablet   Sig: Take 1 tablet (20 mg) by mouth daily     Last Prescription Date:   6/4/2019  Last Fill Qty/Refills:         90, R-4         lisinopril (ZESTRIL) 5 MG tablet   Sig: Take 1 tablet (5 mg) by mouth daily      Last Prescription Date:   6/4/2019  Last Fill Qty/Refills:         90, R-4         omeprazole (PRILOSEC) 20 MG DR capsule  Sig: Take 1 capsule (20 mg) by mouth 2 times daily (before meals)     Last Prescription Date:   6/20/2019  Last Fill Qty/Refills:         180, R-3          Prescription refilled per RN Medication Refill Policy.................... Ellis Falk RN ....................  6/5/2020   11:51 AM

## 2020-06-08 DIAGNOSIS — T38.0X5A STEROID-INDUCED DIABETES MELLITUS (H): ICD-10-CM

## 2020-06-08 DIAGNOSIS — E09.9 STEROID-INDUCED DIABETES MELLITUS (H): ICD-10-CM

## 2020-06-08 NOTE — TELEPHONE ENCOUNTER
Refill Request for: blood glucose (NO BRAND SPECIFIED) test strip   Received From: [facility] #[id number]  Last Written Prescription Date: 2/6/2020 for 400 each and 3 refills  LOV: 12/12/2019 with PCP  Next Appointment: No upcoming office visit on file during initial refill review with PCP  Protocol: Diabetic Supplies Protocol Passed - 06/08/2020 08:30 AM    Prescription [place] per [facility] [place].      Cyn AGUILARN, RN on 6/8/2020 at 8:38 AM

## 2020-06-26 ENCOUNTER — MYC MEDICAL ADVICE (OUTPATIENT)
Dept: FAMILY MEDICINE | Facility: OTHER | Age: 65
End: 2020-06-26

## 2020-06-26 NOTE — TELEPHONE ENCOUNTER
Normally we would not treat prophylactically with antibiotics.  If she is concerned about an infection, he should be evaluated.  Otherwise, Hoa can assess on Tuesday.

## 2020-06-30 ENCOUNTER — OFFICE VISIT (OUTPATIENT)
Dept: FAMILY MEDICINE | Facility: OTHER | Age: 65
End: 2020-06-30
Attending: NURSE PRACTITIONER
Payer: MEDICARE

## 2020-06-30 VITALS
OXYGEN SATURATION: 96 % | DIASTOLIC BLOOD PRESSURE: 80 MMHG | WEIGHT: 168 LBS | RESPIRATION RATE: 16 BRPM | TEMPERATURE: 97.8 F | BODY MASS INDEX: 26.31 KG/M2 | HEART RATE: 70 BPM | SYSTOLIC BLOOD PRESSURE: 120 MMHG

## 2020-06-30 DIAGNOSIS — A49.8 KLEBSIELLA PNEUMONIAE INFECTION: ICD-10-CM

## 2020-06-30 DIAGNOSIS — E11.9 DIABETES MELLITUS TYPE 2, DIET-CONTROLLED (H): ICD-10-CM

## 2020-06-30 DIAGNOSIS — S81.801A WOUND OF RIGHT LOWER EXTREMITY, INITIAL ENCOUNTER: Primary | ICD-10-CM

## 2020-06-30 LAB
ANION GAP SERPL CALCULATED.3IONS-SCNC: 6 MMOL/L (ref 3–14)
BUN SERPL-MCNC: 31 MG/DL (ref 7–25)
CALCIUM SERPL-MCNC: 8.8 MG/DL (ref 8.6–10.3)
CHLORIDE SERPL-SCNC: 109 MMOL/L (ref 98–107)
CO2 SERPL-SCNC: 27 MMOL/L (ref 21–31)
CREAT SERPL-MCNC: 1.28 MG/DL (ref 0.7–1.3)
GFR SERPL CREATININE-BSD FRML MDRD: 56 ML/MIN/{1.73_M2}
GLUCOSE SERPL-MCNC: 63 MG/DL (ref 70–105)
POTASSIUM SERPL-SCNC: 5 MMOL/L (ref 3.5–5.1)
SODIUM SERPL-SCNC: 142 MMOL/L (ref 134–144)

## 2020-06-30 PROCEDURE — G0463 HOSPITAL OUTPT CLINIC VISIT: HCPCS

## 2020-06-30 PROCEDURE — 99214 OFFICE O/P EST MOD 30 MIN: CPT | Performed by: NURSE PRACTITIONER

## 2020-06-30 PROCEDURE — 80048 BASIC METABOLIC PNL TOTAL CA: CPT | Mod: ZL | Performed by: NURSE PRACTITIONER

## 2020-06-30 PROCEDURE — 87070 CULTURE OTHR SPECIMN AEROBIC: CPT | Mod: ZL | Performed by: NURSE PRACTITIONER

## 2020-06-30 PROCEDURE — 36415 COLL VENOUS BLD VENIPUNCTURE: CPT | Mod: ZL | Performed by: NURSE PRACTITIONER

## 2020-06-30 PROCEDURE — 87077 CULTURE AEROBIC IDENTIFY: CPT | Mod: ZL | Performed by: NURSE PRACTITIONER

## 2020-06-30 PROCEDURE — 83036 HEMOGLOBIN GLYCOSYLATED A1C: CPT | Mod: ZL | Performed by: NURSE PRACTITIONER

## 2020-06-30 ASSESSMENT — PAIN SCALES - GENERAL: PAINLEVEL: NO PAIN (0)
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
## 2020-06-30 NOTE — PROGRESS NOTES
Previous A1C is at goal of <8  Lab Results   Component Value Date    A1C 5.4 12/12/2019    A1C 5.3 09/10/2019    A1C 5.7 06/04/2019    A1C 12.4 02/24/2019    A1C 7.8 08/22/2018     Urine microalbumin:creatine: 1 year   Foot exam 6 month  Eye exam 6 months    Tobacco User no  Patient is not on a daily aspirin  Patient is on a Statin.  Blood pressure today of:120 80     BP Readings from Last 1 Encounters:   03/18/20 108/64      is at the goal of <139/89 for diabetics.    IGNACIA LLANES LPN on 6/30/2020 at 10:38 AM

## 2020-07-01 ENCOUNTER — TELEPHONE (OUTPATIENT)
Dept: FAMILY MEDICINE | Facility: OTHER | Age: 65
End: 2020-07-01

## 2020-07-01 LAB — HBA1C MFR BLD: 5.4 % (ref 4–6)

## 2020-07-01 NOTE — TELEPHONE ENCOUNTER
Patient calling to see if Lantus is ready, it is ready, patient will come to unit 1 to  about 1230, please call inpatient pharmacy when he gets here and they will bring it up.  Selena Yao LPN ...... 7/1/2020 3:53 PM

## 2020-07-02 ENCOUNTER — MYC MEDICAL ADVICE (OUTPATIENT)
Dept: FAMILY MEDICINE | Facility: OTHER | Age: 65
End: 2020-07-02

## 2020-07-02 ENCOUNTER — HOSPITAL ENCOUNTER (EMERGENCY)
Facility: OTHER | Age: 65
Discharge: HOME | End: 2020-07-02
Attending: PHYSICIAN ASSISTANT | Admitting: PHYSICIAN ASSISTANT
Payer: MEDICARE

## 2020-07-02 ENCOUNTER — TELEPHONE (OUTPATIENT)
Dept: TRANSPLANT | Facility: CLINIC | Age: 65
End: 2020-07-02

## 2020-07-02 VITALS
OXYGEN SATURATION: 98 % | DIASTOLIC BLOOD PRESSURE: 69 MMHG | BODY MASS INDEX: 27 KG/M2 | HEART RATE: 84 BPM | RESPIRATION RATE: 16 BRPM | HEIGHT: 66 IN | TEMPERATURE: 99.8 F | SYSTOLIC BLOOD PRESSURE: 121 MMHG | WEIGHT: 168 LBS

## 2020-07-02 DIAGNOSIS — S81.801A OPEN WOUND OF RIGHT LOWER LEG, INITIAL ENCOUNTER: ICD-10-CM

## 2020-07-02 LAB
ALBUMIN SERPL-MCNC: 3.6 G/DL (ref 3.5–5.7)
ALP SERPL-CCNC: 46 U/L (ref 34–104)
ALT SERPL W P-5'-P-CCNC: 55 U/L (ref 7–52)
ANION GAP SERPL CALCULATED.3IONS-SCNC: 8 MMOL/L (ref 3–14)
AST SERPL W P-5'-P-CCNC: 34 U/L (ref 13–39)
BACTERIA SPEC CULT: ABNORMAL
BASOPHILS # BLD AUTO: 0 10E9/L (ref 0–0.2)
BASOPHILS NFR BLD AUTO: 0.3 %
BILIRUB SERPL-MCNC: 0.5 MG/DL (ref 0.3–1)
BUN SERPL-MCNC: 41 MG/DL (ref 7–25)
CALCIUM SERPL-MCNC: 8.8 MG/DL (ref 8.6–10.3)
CHLORIDE SERPL-SCNC: 106 MMOL/L (ref 98–107)
CO2 SERPL-SCNC: 25 MMOL/L (ref 21–31)
CREAT SERPL-MCNC: 1.22 MG/DL (ref 0.7–1.3)
CRP SERPL-MCNC: 0.4 MG/L
DIFFERENTIAL METHOD BLD: ABNORMAL
EOSINOPHIL # BLD AUTO: 0.1 10E9/L (ref 0–0.7)
EOSINOPHIL NFR BLD AUTO: 1.3 %
ERYTHROCYTE [DISTWIDTH] IN BLOOD BY AUTOMATED COUNT: 12.6 % (ref 10–15)
ERYTHROCYTE [SEDIMENTATION RATE] IN BLOOD BY WESTERGREN METHOD: 16 MM/H (ref 1–10)
GFR SERPL CREATININE-BSD FRML MDRD: 59 ML/MIN/{1.73_M2}
GLUCOSE SERPL-MCNC: 109 MG/DL (ref 70–105)
HCT VFR BLD AUTO: 36.4 % (ref 40–53)
HGB BLD-MCNC: 11.6 G/DL (ref 13.3–17.7)
IMM GRANULOCYTES # BLD: 0.1 10E9/L (ref 0–0.4)
IMM GRANULOCYTES NFR BLD: 0.6 %
LACTATE BLD-SCNC: 0.9 MMOL/L (ref 0.7–2)
LYMPHOCYTES # BLD AUTO: 1.5 10E9/L (ref 0.8–5.3)
LYMPHOCYTES NFR BLD AUTO: 19 %
MCH RBC QN AUTO: 33.1 PG (ref 26.5–33)
MCHC RBC AUTO-ENTMCNC: 31.9 G/DL (ref 31.5–36.5)
MCV RBC AUTO: 104 FL (ref 78–100)
MONOCYTES # BLD AUTO: 0.4 10E9/L (ref 0–1.3)
MONOCYTES NFR BLD AUTO: 5.4 %
NEUTROPHILS # BLD AUTO: 5.7 10E9/L (ref 1.6–8.3)
NEUTROPHILS NFR BLD AUTO: 73.4 %
PLATELET # BLD AUTO: 169 10E9/L (ref 150–450)
POTASSIUM SERPL-SCNC: 4.1 MMOL/L (ref 3.5–5.1)
PROT SERPL-MCNC: 6.3 G/DL (ref 6.4–8.9)
RBC # BLD AUTO: 3.5 10E12/L (ref 4.4–5.9)
SODIUM SERPL-SCNC: 139 MMOL/L (ref 134–144)
SPECIMEN SOURCE: ABNORMAL
WBC # BLD AUTO: 7.8 10E9/L (ref 4–11)

## 2020-07-02 PROCEDURE — 85652 RBC SED RATE AUTOMATED: CPT | Performed by: PHYSICIAN ASSISTANT

## 2020-07-02 PROCEDURE — 85025 COMPLETE CBC W/AUTO DIFF WBC: CPT | Performed by: PHYSICIAN ASSISTANT

## 2020-07-02 PROCEDURE — 99284 EMERGENCY DEPT VISIT MOD MDM: CPT | Mod: Z6 | Performed by: PHYSICIAN ASSISTANT

## 2020-07-02 PROCEDURE — 36415 COLL VENOUS BLD VENIPUNCTURE: CPT | Performed by: PHYSICIAN ASSISTANT

## 2020-07-02 PROCEDURE — 87040 BLOOD CULTURE FOR BACTERIA: CPT | Performed by: PHYSICIAN ASSISTANT

## 2020-07-02 PROCEDURE — 86140 C-REACTIVE PROTEIN: CPT | Performed by: PHYSICIAN ASSISTANT

## 2020-07-02 PROCEDURE — 96372 THER/PROPH/DIAG INJ SC/IM: CPT | Performed by: PHYSICIAN ASSISTANT

## 2020-07-02 PROCEDURE — 25000125 ZZHC RX 250: Performed by: PHYSICIAN ASSISTANT

## 2020-07-02 PROCEDURE — 83605 ASSAY OF LACTIC ACID: CPT | Performed by: PHYSICIAN ASSISTANT

## 2020-07-02 PROCEDURE — 25000128 H RX IP 250 OP 636: Performed by: PHYSICIAN ASSISTANT

## 2020-07-02 PROCEDURE — 99284 EMERGENCY DEPT VISIT MOD MDM: CPT | Performed by: PHYSICIAN ASSISTANT

## 2020-07-02 PROCEDURE — 80053 COMPREHEN METABOLIC PANEL: CPT | Performed by: PHYSICIAN ASSISTANT

## 2020-07-02 RX ORDER — CIPROFLOXACIN 500 MG/1
500 TABLET, FILM COATED ORAL 2 TIMES DAILY
Qty: 20 TABLET | Refills: 0 | Status: SHIPPED | OUTPATIENT
Start: 2020-07-02 | End: 2020-07-12

## 2020-07-02 RX ORDER — ONDANSETRON 4 MG/1
4 TABLET, ORALLY DISINTEGRATING ORAL ONCE
Status: COMPLETED | OUTPATIENT
Start: 2020-07-02 | End: 2020-07-02

## 2020-07-02 RX ORDER — ONDANSETRON 4 MG/1
4 TABLET, ORALLY DISINTEGRATING ORAL EVERY 8 HOURS PRN
Qty: 20 TABLET | Refills: 0 | Status: SHIPPED | OUTPATIENT
Start: 2020-07-02 | End: 2020-07-02

## 2020-07-02 RX ORDER — ONDANSETRON 4 MG/1
4 TABLET, ORALLY DISINTEGRATING ORAL EVERY 8 HOURS PRN
Qty: 20 TABLET | Refills: 0 | Status: SHIPPED | OUTPATIENT
Start: 2020-07-02 | End: 2020-11-27

## 2020-07-02 RX ORDER — CEFTRIAXONE SODIUM 1 G
1 VIAL (EA) INJECTION ONCE
Status: COMPLETED | OUTPATIENT
Start: 2020-07-02 | End: 2020-07-02

## 2020-07-02 RX ADMIN — LIDOCAINE HYDROCHLORIDE 1 G: 10 INJECTION, SOLUTION EPIDURAL; INFILTRATION; INTRACAUDAL; PERINEURAL at 11:06

## 2020-07-02 RX ADMIN — ONDANSETRON 4 MG: 4 TABLET, ORALLY DISINTEGRATING ORAL at 10:59

## 2020-07-02 ASSESSMENT — ENCOUNTER SYMPTOMS
WEAKNESS: 0
FATIGUE: 0
ADENOPATHY: 0
SORE THROAT: 0
TROUBLE SWALLOWING: 0
APPETITE CHANGE: 0
ABDOMINAL PAIN: 0
FACIAL SWELLING: 0
BACK PAIN: 0
HEADACHES: 0
NAUSEA: 0
HEMATURIA: 0
SINUS PRESSURE: 0
DYSURIA: 0
WOUND: 0
NECK PAIN: 0
BRUISES/BLEEDS EASILY: 0
COUGH: 0
DIZZINESS: 0
ACTIVITY CHANGE: 0
FREQUENCY: 0
DIARRHEA: 0
CHILLS: 0
CHEST TIGHTNESS: 0
VOICE CHANGE: 0
SEIZURES: 0
LIGHT-HEADEDNESS: 0
VOMITING: 0
FEVER: 0
CONFUSION: 0
EYE PAIN: 0
SHORTNESS OF BREATH: 0

## 2020-07-02 ASSESSMENT — MIFFLIN-ST. JEOR: SCORE: 1479.79

## 2020-07-02 NOTE — ED TRIAGE NOTES
Pt reports he thinks he has pseudomonas because he has a cut on his leg and he got the lab results back this AM.  He was supposed to start cipro but hasn't picked it up yet.  His PCP wants him to get IV abx to get a jump start on things.    His lung transplant coordinator they call whenever anything is abnormal is at  [phone]  Today they talked to Cindy and IV abx is the recommendation.
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
## 2020-07-02 NOTE — ED AVS SNAPSHOT
[facility]  [address]  [place] [address]  Phone:  [phone]  Fax:  [phone]                                    Aubrey Duncan   MRN: [id number]    Department:  [facility]   Date of Visit:  7/2/2020           After Visit Summary Signature Page    I have received my discharge instructions, and my questions have been answered. I have discussed any challenges I see with this plan with the nurse or doctor.    ..........................................................................................................................................  Patient/Patient Representative Signature      ..........................................................................................................................................  Patient Representative Print Name and Relationship to Patient    ..................................................               ................................................  Date                                   Time    ..........................................................................................................................................  Reviewed by Signature/Title    ...................................................              ..............................................  Date                                               Time          22EPIC Rev 08/18

## 2020-07-02 NOTE — TELEPHONE ENCOUNTER
Wife calls to say patient cut leg a week ago, had culture done a couple days ago and it grew pseudomonus (got results today). Yesterday patient was feeling tired and today he has vomited a couple times, temp consistently 99.3, states patient feels tired/doesn't feel good.   Rx to pharmacy for Cipro for wound.    Wife wondering if Cipro is enough or should see someone. Given patient is having systemic symptoms, requested patient see PCP today to further evaluation. If not able to see PCP, then to go to urgent care.     Wife verbalized understanding and agreement of plan. Will call with further questions/concerns/updates.

## 2020-07-03 ENCOUNTER — MYC MEDICAL ADVICE (OUTPATIENT)
Dept: FAMILY MEDICINE | Facility: OTHER | Age: 65
End: 2020-07-03

## 2020-07-03 ENCOUNTER — TELEPHONE (OUTPATIENT)
Dept: TRANSPLANT | Facility: CLINIC | Age: 65
End: 2020-07-03

## 2020-07-03 DIAGNOSIS — A49.8 KLEBSIELLA PNEUMONIAE INFECTION: Primary | ICD-10-CM

## 2020-07-03 RX ORDER — CIPROFLOXACIN 500 MG/1
500 TABLET, FILM COATED ORAL 2 TIMES DAILY
Qty: 8 TABLET | Refills: 0 | Status: SHIPPED | OUTPATIENT
Start: 2020-07-03 | End: 2020-08-04

## 2020-07-03 NOTE — TELEPHONE ENCOUNTER
Called patient back, acknowledged infection in leg wound, told patient current course of treatment appropriate. Patient states he is feeling better today. Will call with any changes/updates.

## 2020-07-03 NOTE — TELEPHONE ENCOUNTER
Wife called back with updates on patient's wound.  Patient went to the ER yesterday. WBC was WNL. Gave patient Zofran for nausea. IM Rocephin in ER. Tmax last night 101, better today. Patient to take Cipro x 10 days.   Wife says patient looks and feels much better this AM, says wound also looks better this AM.   Will follow up with PCP in 1 week.     Will call with additional questions/concerns/updates.

## 2020-07-08 LAB
BACTERIA SPEC CULT: NORMAL
BACTERIA SPEC CULT: NORMAL
SPECIMEN SOURCE: NORMAL
SPECIMEN SOURCE: NORMAL

## 2020-07-09 ENCOUNTER — TELEPHONE (OUTPATIENT)
Dept: FAMILY MEDICINE | Facility: OTHER | Age: 65
End: 2020-07-09

## 2020-07-09 NOTE — TELEPHONE ENCOUNTER
Called patient back, [facility] is full and if he is having a reaction to go to the ER, chente Neri

## 2020-07-13 ENCOUNTER — TRANSFERRED RECORDS (OUTPATIENT)
Dept: HEALTH INFORMATION MANAGEMENT | Facility: OTHER | Age: 65
End: 2020-07-13

## 2020-07-16 ENCOUNTER — OFFICE VISIT (OUTPATIENT)
Dept: FAMILY MEDICINE | Facility: OTHER | Age: 65
End: 2020-07-16
Attending: NURSE PRACTITIONER
Payer: MEDICARE

## 2020-07-16 VITALS
RESPIRATION RATE: 18 BRPM | WEIGHT: 166 LBS | TEMPERATURE: 97.3 F | HEART RATE: 64 BPM | DIASTOLIC BLOOD PRESSURE: 68 MMHG | OXYGEN SATURATION: 99 % | BODY MASS INDEX: 26.79 KG/M2 | SYSTOLIC BLOOD PRESSURE: 132 MMHG

## 2020-07-16 DIAGNOSIS — S81.801D WOUND OF RIGHT LOWER EXTREMITY, SUBSEQUENT ENCOUNTER: Primary | ICD-10-CM

## 2020-07-16 DIAGNOSIS — E11.9 DIABETES MELLITUS TYPE 2, DIET-CONTROLLED (H): ICD-10-CM

## 2020-07-16 DIAGNOSIS — A49.8 KLEBSIELLA PNEUMONIAE INFECTION: ICD-10-CM

## 2020-07-16 DIAGNOSIS — L98.9 SKIN LESION OF LEFT ARM: ICD-10-CM

## 2020-07-16 PROCEDURE — 87070 CULTURE OTHR SPECIMN AEROBIC: CPT | Mod: ZL | Performed by: NURSE PRACTITIONER

## 2020-07-16 PROCEDURE — G0463 HOSPITAL OUTPT CLINIC VISIT: HCPCS

## 2020-07-16 PROCEDURE — 87077 CULTURE AEROBIC IDENTIFY: CPT | Mod: ZL | Performed by: NURSE PRACTITIONER

## 2020-07-16 PROCEDURE — 99213 OFFICE O/P EST LOW 20 MIN: CPT | Performed by: NURSE PRACTITIONER

## 2020-07-16 ASSESSMENT — PAIN SCALES - GENERAL: PAINLEVEL: NO PAIN (0)
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
## 2020-07-16 NOTE — NURSING NOTE
"Chief Complaint   Patient presents with     RECHECK     Right leg injury       Patient presents today in clinic for the following right leg injury follow up.    Initial /68   Pulse 64   Temp 97.3  F (36.3  C)   Resp 18   Wt 75.3 kg (166 lb)   SpO2 99%   BMI 26.79 kg/m   Estimated body mass index is 26.79 kg/m  as calculated from the following:    Height as of 7/2/20: 1.676 m (5' 6\").    Weight as of this encounter: 75.3 kg (166 lb).  Medication Reconciliation: complete    MARIO ALBERTO, FLINCK, LPN  "

## 2020-07-18 LAB
BACTERIA SPEC CULT: ABNORMAL
SPECIMEN SOURCE: ABNORMAL

## 2020-07-20 RX ORDER — SULFAMETHOXAZOLE/TRIMETHOPRIM 800-160 MG
1 TABLET ORAL 2 TIMES DAILY
Qty: 14 TABLET | Refills: 0 | Status: SHIPPED | OUTPATIENT
Start: 2020-07-20 | End: 2020-07-27

## 2020-07-22 ENCOUNTER — TELEPHONE (OUTPATIENT)
Dept: EDUCATION SERVICES | Facility: OTHER | Age: 65
End: 2020-07-22

## 2020-07-22 NOTE — TELEPHONE ENCOUNTER
Message received to please order Lantus insulin from PAP at [facility].  Phoned patient, shares has been approved for PAP until 12/31/2020.    [facility] will fax refill request to Hoa Olivarez CNP.  After [facility] faxed back, [facility] to [facility] within 3 - 5 business days per [employer] delivery.    Patient notified.    Ely Aldrich RN, BSN, [facility]  7/22/2020 4:03 PM

## 2020-07-25 ENCOUNTER — MYC MEDICAL ADVICE (OUTPATIENT)
Dept: FAMILY MEDICINE | Facility: OTHER | Age: 65
End: 2020-07-25

## 2020-07-29 DIAGNOSIS — E11.9 DIABETES MELLITUS TYPE 2, DIET-CONTROLLED (H): ICD-10-CM

## 2020-07-30 ENCOUNTER — OFFICE VISIT (OUTPATIENT)
Dept: FAMILY MEDICINE | Facility: OTHER | Age: 65
End: 2020-07-30
Attending: NURSE PRACTITIONER
Payer: MEDICARE

## 2020-07-30 VITALS
HEART RATE: 79 BPM | OXYGEN SATURATION: 98 % | BODY MASS INDEX: 26.89 KG/M2 | DIASTOLIC BLOOD PRESSURE: 62 MMHG | WEIGHT: 166.6 LBS | TEMPERATURE: 97.1 F | SYSTOLIC BLOOD PRESSURE: 134 MMHG | RESPIRATION RATE: 16 BRPM

## 2020-07-30 DIAGNOSIS — L98.9 SKIN LESION: Primary | ICD-10-CM

## 2020-07-30 PROCEDURE — G0463 HOSPITAL OUTPT CLINIC VISIT: HCPCS

## 2020-07-30 PROCEDURE — 99213 OFFICE O/P EST LOW 20 MIN: CPT | Performed by: NURSE PRACTITIONER

## 2020-07-30 ASSESSMENT — PAIN SCALES - GENERAL: PAINLEVEL: NO PAIN (0)

## 2020-07-30 NOTE — NURSING NOTE
"Chief Complaint   Patient presents with     RECHECK     Leg follow up/ Arm       Patient presents today in clinic for the following wound follow up.    Initial /62   Pulse 79   Temp 97.1  F (36.2  C)   Resp 16   Wt 75.6 kg (166 lb 9.6 oz)   SpO2 98%   BMI 26.89 kg/m   Estimated body mass index is 26.89 kg/m  as calculated from the following:    Height as of 7/2/20: 1.676 m (5' 6\").    Weight as of this encounter: 75.6 kg (166 lb 9.6 oz).  Medication Reconciliation: complete    MARIO ALBERTO, FLINCK, LPN  "
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
## 2020-07-31 RX ORDER — LANCETS
EACH MISCELLANEOUS
Qty: 400 EACH | Refills: 2 | Status: SHIPPED | OUTPATIENT
Start: 2020-07-31 | End: 2021-06-21

## 2020-07-31 NOTE — TELEPHONE ENCOUNTER
"    LOV: 7/16/2020  Future Office visit:    Next 5 appointments (look out 90 days)    Oct 13, 2020 11:00 AM CDT  (Arrive by 10:45 AM)  Return Visit with SAMMI Luz MD  [facility] ([facility] ) [address] [place] [address]  [phone]           Routing refill request to provider for review/approval because:  Failed protocol    Requested Prescriptions   Pending Prescriptions Disp Refills     Microlet Lancets MISC [Pharmacy Med Name: MICROLET LANCET] 100 each      Sig: USE AS DIRECTED FOR TESTING BLOOD GLUCOSE 4 TIMES DAILY       Diabetic Supplies Protocol Failed - 7/29/2020  9:16 AM        Failed - Recent (6 mo) or future (30 days) visit within the authorizing provider's specialty     Patient had office visit in the last 6 months or has a visit in the next 30 days with authorizing provider.  See \"Patient Info\" tab in inbasket, or \"Choose Columns\" in Meds & Orders section of the refill encounter.            Passed - Medication is active on med list        Passed - Patient is 18 years of age or older           "

## 2020-08-04 ENCOUNTER — OFFICE VISIT (OUTPATIENT)
Dept: SURGERY | Facility: OTHER | Age: 65
End: 2020-08-04
Attending: NURSE PRACTITIONER
Payer: MEDICARE

## 2020-08-04 VITALS
SYSTOLIC BLOOD PRESSURE: 120 MMHG | TEMPERATURE: 96 F | BODY MASS INDEX: 26.99 KG/M2 | HEART RATE: 66 BPM | WEIGHT: 167.2 LBS | DIASTOLIC BLOOD PRESSURE: 84 MMHG | RESPIRATION RATE: 18 BRPM

## 2020-08-04 DIAGNOSIS — L98.9 SKIN LESION: Primary | ICD-10-CM

## 2020-08-04 DIAGNOSIS — Z94.2 S/P LUNG TRANSPLANT (H): Chronic | ICD-10-CM

## 2020-08-04 PROCEDURE — 88305 TISSUE EXAM BY PATHOLOGIST: CPT

## 2020-08-04 PROCEDURE — 11602 EXC TR-EXT MAL+MARG 1.1-2 CM: CPT | Performed by: SURGERY

## 2020-08-04 PROCEDURE — G0463 HOSPITAL OUTPT CLINIC VISIT: HCPCS | Mod: 25

## 2020-08-04 RX ORDER — TACROLIMUS 0.5 MG/1
CAPSULE ORAL
Qty: 30 CAPSULE | Refills: 11 | Status: SHIPPED | OUTPATIENT
Start: 2020-08-04 | End: 2021-08-19

## 2020-08-04 RX ORDER — TACROLIMUS 1 MG/1
2 CAPSULE ORAL 2 TIMES DAILY
Qty: 120 CAPSULE | Refills: 11 | Status: SHIPPED | OUTPATIENT
Start: 2020-08-04 | End: 2021-08-17

## 2020-08-04 ASSESSMENT — PAIN SCALES - GENERAL: PAINLEVEL: NO PAIN (0)

## 2020-08-04 NOTE — NURSING NOTE
"Chief Complaint   Patient presents with     Derm Problem     lesion on left upper arm       Initial /84 (BP Location: Right arm, Patient Position: Sitting, Cuff Size: Adult Large)   Pulse 66   Temp 96  F (35.6  C) (Temporal)   Resp 18   Wt 75.8 kg (167 lb 3.2 oz)   BMI 26.99 kg/m   Estimated body mass index is 26.99 kg/m  as calculated from the following:    Height as of 7/2/20: 1.676 m (5' 6\").    Weight as of this encounter: 75.8 kg (167 lb 3.2 oz).  Medication Reconciliation: complete    Sussy Rivera LPN  "

## 2020-08-04 NOTE — NURSING NOTE
"Chief Complaint   Patient presents with     Derm Problem     lesion on left upper arm       Initial /84 (BP Location: Right arm, Patient Position: Sitting, Cuff Size: Adult Large)   Pulse 66   Temp 96  F (35.6  C) (Temporal)   Resp 18   Wt 75.8 kg (167 lb 3.2 oz)   BMI 26.99 kg/m   Estimated body mass index is 26.99 kg/m  as calculated from the following:    Height as of 7/2/20: 1.676 m (5' 6\").    Weight as of this encounter: 75.8 kg (167 lb 3.2 oz).  Medication Reconciliation: complete    Sussy Rivera LPN     TIMEOUT  [place] Protocol    A. Pre-procedure verification complete               1-relevant information / documentation available, reviewed and properly matched to the patient; 2-consent accurate and complete, 3-equipment and supplies available    B. Site marking complete     Site marked if not in continuous attendance with patient    C. TIME OUT completed     Time Out was conducted just prior to starting procedure to verify the eight required elements: 1-patient identity, 2-consent accurate and complete, 3-position, 4-correct side/site marked (if applicable), 5-procedure, 6-relevant images / results properly labeled and displayed (if applicable), 7-antibiotics / irrigation fluids (if applicable), 8-safety precautions.  "

## 2020-08-04 NOTE — PATIENT INSTRUCTIONS
Your incision was closed with stitches that will need to be removed.  Follow up in 1 week for suture removal.    It is ok to remove the dressing and get the incision wet in the shower on the day after your procedure.     Don't soak in a tub, pool or lake for 5 days.   If you have concerns, please call.

## 2020-08-04 NOTE — PROGRESS NOTES
Procedure Note  Pre/Post Operative Diagnosis:   1.3 cm ( with margins ) ulcerated skin lesion left upper arm    Procedure:    Excision    Surgeon: Vladislav Garcia MD FACS    Local Anesthesia: 1% lidocaine with0.25%Marcaine with epinephrine    Indication for the procedure:    This is a 65 year old male patient referred by Hoa Olivarez  with non-healing skin lesion left upper arm. He is on chronic immunosuppression and has thin skin.    After explaining the risks to include bleeding, infection, recurrence or need for re-excision,and scarring the patient wished to proceed.    Procedure:   The area was prepped and draped in usual sterile fashion with ChloraPrep . After, adequate local anesthesia,an elliptical skin incision was made to encompass the lesion. The subcutaneous tissue was approximated with 3-0 Vicryl sutures  The skin was closed with running 3-0 Prolene suture.  A clean dry dressing was applied.    Plan:  The patient will followup in one week for suture removal and to review the pathology. Patient will followup if there any problems with the wound including redness or drainage.

## 2020-08-06 PROBLEM — Z98.890 H/O BASAL CELL CARCINOMA EXCISION: Status: ACTIVE | Noted: 2020-08-04

## 2020-08-06 PROBLEM — Z85.828 H/O BASAL CELL CARCINOMA EXCISION: Status: ACTIVE | Noted: 2020-08-04

## 2020-08-20 ENCOUNTER — HOSPITAL ENCOUNTER (EMERGENCY)
Facility: OTHER | Age: 65
Discharge: HOME | End: 2020-08-20
Attending: PHYSICIAN ASSISTANT | Admitting: PHYSICIAN ASSISTANT
Payer: MEDICARE

## 2020-08-20 VITALS
BODY MASS INDEX: 26.84 KG/M2 | HEART RATE: 88 BPM | HEIGHT: 66 IN | SYSTOLIC BLOOD PRESSURE: 131 MMHG | DIASTOLIC BLOOD PRESSURE: 84 MMHG | RESPIRATION RATE: 16 BRPM | TEMPERATURE: 98.4 F | OXYGEN SATURATION: 96 % | WEIGHT: 167 LBS

## 2020-08-20 DIAGNOSIS — S81.809A: ICD-10-CM

## 2020-08-20 DIAGNOSIS — S81.812A: ICD-10-CM

## 2020-08-20 PROCEDURE — 13122 CMPLX RPR S/A/L ADDL 5 CM/>: CPT | Performed by: PHYSICIAN ASSISTANT

## 2020-08-20 PROCEDURE — 96374 THER/PROPH/DIAG INJ IV PUSH: CPT | Mod: XU | Performed by: PHYSICIAN ASSISTANT

## 2020-08-20 PROCEDURE — 13121 CMPLX RPR S/A/L 2.6-7.5 CM: CPT | Mod: Z6 | Performed by: PHYSICIAN ASSISTANT

## 2020-08-20 PROCEDURE — 90715 TDAP VACCINE 7 YRS/> IM: CPT | Performed by: PHYSICIAN ASSISTANT

## 2020-08-20 PROCEDURE — 90471 IMMUNIZATION ADMIN: CPT | Performed by: PHYSICIAN ASSISTANT

## 2020-08-20 PROCEDURE — 99284 EMERGENCY DEPT VISIT MOD MDM: CPT | Mod: 25 | Performed by: PHYSICIAN ASSISTANT

## 2020-08-20 PROCEDURE — 13122 CMPLX RPR S/A/L ADDL 5 CM/>: CPT | Mod: Z6 | Performed by: PHYSICIAN ASSISTANT

## 2020-08-20 PROCEDURE — 25000128 H RX IP 250 OP 636: Performed by: PHYSICIAN ASSISTANT

## 2020-08-20 PROCEDURE — 99283 EMERGENCY DEPT VISIT LOW MDM: CPT | Mod: Z6 | Performed by: PHYSICIAN ASSISTANT

## 2020-08-20 PROCEDURE — 13121 CMPLX RPR S/A/L 2.6-7.5 CM: CPT | Performed by: PHYSICIAN ASSISTANT

## 2020-08-20 PROCEDURE — 25000125 ZZHC RX 250: Performed by: PHYSICIAN ASSISTANT

## 2020-08-20 RX ORDER — CEFTRIAXONE SODIUM 1 G
1 VIAL (EA) INJECTION ONCE
Status: COMPLETED | OUTPATIENT
Start: 2020-08-20 | End: 2020-08-20

## 2020-08-20 RX ORDER — HYDROCODONE BITARTRATE AND ACETAMINOPHEN 5; 325 MG/1; MG/1
1 TABLET ORAL EVERY 6 HOURS PRN
Qty: 15 TABLET | Refills: 0 | Status: SHIPPED | OUTPATIENT
Start: 2020-08-20 | End: 2020-10-16

## 2020-08-20 RX ORDER — LIDOCAINE HYDROCHLORIDE 10 MG/ML
10 INJECTION, SOLUTION INFILTRATION; PERINEURAL ONCE
Status: COMPLETED | OUTPATIENT
Start: 2020-08-20 | End: 2020-08-20

## 2020-08-20 RX ORDER — CEPHALEXIN 500 MG/1
500 CAPSULE ORAL 3 TIMES DAILY
Qty: 30 CAPSULE | Refills: 0 | Status: SHIPPED | OUTPATIENT
Start: 2020-08-20 | End: 2020-08-30

## 2020-08-20 RX ADMIN — LIDOCAINE HYDROCHLORIDE 1 G: 10 INJECTION, SOLUTION EPIDURAL; INFILTRATION; INTRACAUDAL; PERINEURAL at 15:37

## 2020-08-20 RX ADMIN — TETANUS TOXOID, REDUCED DIPHTHERIA TOXOID AND ACELLULAR PERTUSSIS VACCINE, ADSORBED 0.5 ML: 5; 2.5; 8; 8; 2.5 SUSPENSION INTRAMUSCULAR at 15:37

## 2020-08-20 RX ADMIN — LIDOCAINE HYDROCHLORIDE 10 ML: 10 INJECTION, SOLUTION EPIDURAL; INFILTRATION; INTRACAUDAL; PERINEURAL at 15:39

## 2020-08-20 ASSESSMENT — ENCOUNTER SYMPTOMS
FEVER: 0
COUGH: 0
DIZZINESS: 0
VOICE CHANGE: 0
APPETITE CHANGE: 0
WOUND: 0
HEADACHES: 0
HEMATURIA: 0
NECK PAIN: 0
ABDOMINAL PAIN: 0
NAUSEA: 0
BACK PAIN: 0
FREQUENCY: 0
CHILLS: 0
SEIZURES: 0
FATIGUE: 0
WEAKNESS: 0
CHEST TIGHTNESS: 0
DIARRHEA: 0
SHORTNESS OF BREATH: 0
DYSURIA: 0
VOMITING: 0
SINUS PRESSURE: 0
CONFUSION: 0
SORE THROAT: 0
ADENOPATHY: 0
LIGHT-HEADEDNESS: 0
TROUBLE SWALLOWING: 0
FACIAL SWELLING: 0
BRUISES/BLEEDS EASILY: 0
ACTIVITY CHANGE: 0
EYE PAIN: 0

## 2020-08-20 ASSESSMENT — MIFFLIN-ST. JEOR: SCORE: 1475.26
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
## 2020-08-20 NOTE — ED AVS SNAPSHOT
[facility]  [address]  [place] [address]  Phone:  [phone]  Fax:  [phone]                                    Aubrey Duncan   MRN: [id number]    Department:  [facility]   Date of Visit:  8/20/2020           After Visit Summary Signature Page    I have received my discharge instructions, and my questions have been answered. I have discussed any challenges I see with this plan with the nurse or doctor.    ..........................................................................................................................................  Patient/Patient Representative Signature      ..........................................................................................................................................  Patient Representative Print Name and Relationship to Patient    ..................................................               ................................................  Date                                   Time    ..........................................................................................................................................  Reviewed by Signature/Title    ...................................................              ..............................................  Date                                               Time          22EPIC Rev 08/18

## 2020-08-20 NOTE — ED TRIAGE NOTES
Pt here by himself with c/o lt leg skin tear, pt reports that he cut it on truck up by the [place] this [place], pt has it bandaged, no active bleeding, pt reports that his skin is very fragile due to prednisone and other meds that he is on, VSS, pt brought back into ER to be evaluated   Records were received in [facility] - Closing encounter.

## 2020-09-01 ENCOUNTER — OFFICE VISIT (OUTPATIENT)
Dept: FAMILY MEDICINE | Facility: OTHER | Age: 65
End: 2020-09-01
Attending: NURSE PRACTITIONER
Payer: MEDICARE

## 2020-09-01 VITALS
HEART RATE: 67 BPM | RESPIRATION RATE: 16 BRPM | SYSTOLIC BLOOD PRESSURE: 134 MMHG | BODY MASS INDEX: 27.08 KG/M2 | WEIGHT: 167.8 LBS | OXYGEN SATURATION: 98 % | TEMPERATURE: 97.2 F | DIASTOLIC BLOOD PRESSURE: 70 MMHG

## 2020-09-01 DIAGNOSIS — S81.812A LACERATION OF LEFT CALF: Primary | ICD-10-CM

## 2020-09-01 PROCEDURE — G0463 HOSPITAL OUTPT CLINIC VISIT: HCPCS

## 2020-09-01 PROCEDURE — 99024 POSTOP FOLLOW-UP VISIT: CPT | Performed by: NURSE PRACTITIONER

## 2020-09-01 ASSESSMENT — PAIN SCALES - GENERAL: PAINLEVEL: NO PAIN (0)

## 2020-09-01 NOTE — PROGRESS NOTES
HPI:    Aubrey Duncan is a 65 year old male who presents to clinic today for follow-up left calf laceration.  He was seen in the emergency room on August 20, 2020 for left calf laceration.  Sutures were placed. He was given Rocephin in ER and cephalexin 3 times daily for 10 days for primary treatment.  He reports mild pain, treating with Tylenol.  No signs of infection, continuing to have serosanguineous discharge on bandages daily.    Past Medical History:   Diagnosis Date     Alpha-1-antitrypsin deficiency (H)      Basal cell carcinoma      CMV (cytomegalovirus infection) (H)     Reacttivation Sept 2013 when valcyte held     DVT of upper extremity (deep vein thrombosis) (H) Sept 2013    Nonocclusive thrombosis extending from the right subclavian vein to the right axillary vein,  Segmental occlusion of right basilic vein in the upper arm. 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    With DVT and thrombocytopenia     Hypertension      Lung transplant status, bilateral (H) Sept 8, 2013    Complicated by HIT and esophageal dysfunction     Squamous cell carcinoma      Steroid-induced diabetes mellitus (H)      Thrombocytopaenia     due to HIT   Had    Current Outpatient Medications   Medication Sig Dispense Refill     albuterol (PROAIR HFA, PROVENTIL HFA, VENTOLIN HFA) 108 (90 BASE) MCG/ACT inhaler Inhale 2 puffs into the lungs every 6 hours as needed for shortness of breath / dyspnea or wheezing 3 Inhaler 11     azaTHIOprine (IMURAN) 50 MG tablet Take 0.5 tablets (25 mg) by mouth daily 15 tablet 11     blood glucose (NO BRAND SPECIFIED) test strip USE AS DIRECTED FOR TESTING BLOOD GLUCOSE 3 TIMES DAILY 400 each 3     calcium-vitamin D (CALTRATE) 600-400 MG-UNIT per tablet Take 1 tablet by mouth 2 times daily (with meals) 60 tablet 12     ferrous sulfate (FEROSUL)  325 (65 Fe) MG tablet Take 325 mg by mouth daily (with breakfast)       fluorouracil (EFUDEX) 5 % cream Apply topically daily Use once per day for 10 days on areas of scalp, forehead and face that are scaled and gritty (one month on the central forehead). Avoid eyes. 40 g 1     furosemide (LASIX) 20 MG tablet Take 1 tablet (20 mg) by mouth daily 90 tablet 4     HYDROcodone-acetaminophen (NORCO) 5-325 MG tablet Take 1 tablet by mouth every 6 hours as needed 15 tablet 0     insulin aspart (NOVOLOG PEN) 100 UNIT/ML pen Take 5 U am, 3 unit(s) non, 4 unit(s) pm of insulin within 30 minutes of start of breakfast, lunch, and dinner. Do not give if blood sugar is less than 70 mg/dl. (Patient taking differently: Take 5 U am, 3 unit(s) non, 5 unit(s) pm of insulin within 30 minutes of start of breakfast, lunch, and dinner. Do not give if blood sugar is less than 70 mg/dl.) 10 mL 11     insulin glargine (LANTUS PEN) 100 UNIT/ML pen Inject 23 Units Subcutaneous every morning (before breakfast) 30 mL 3     insulin pen needle (32G X 4 MM) 32G X 4 MM miscellaneous Use 4 pen needles daily or as directed. Dispense as insurance allows. Dx. Code: E09.9 400 each 0     Lancet Devices (MICROLET NEXT LANCING DEVICE) MISC USE AS DIRECTED 1 each 3     lisinopril (ZESTRIL) 5 MG tablet Take 1 tablet (5 mg) by mouth daily 90 tablet 4     loperamide (IMODIUM) 2 MG capsule Take 1 capsule (2 mg) by mouth 4 times daily as needed for diarrhea 120 capsule 12     metoprolol tartrate (LOPRESSOR) 25 MG tablet Take 1 tablet (25 mg) by mouth 2 times daily 180 tablet 4     Microlet Lancets MISC USE AS DIRECTED FOR TESTING BLOOD GLUCOSE 4 TIMES DAILY 400 each 2     multivitamin, therapeutic (THERA-VIT) TABS Take 1 tablet by mouth daily 30 tablet 12     MYFORTIC (BRAND) 180 MG EC tablet Take 180 mg by mouth 2 times daily       omeprazole (PRILOSEC) 20 MG DR capsule Take 1 capsule (20 mg) by mouth 2 times daily (before meals) 180 capsule 3     ondansetron  "(ZOFRAN ODT) 4 MG ODT tab Take 1 tablet (4 mg) by mouth every 8 hours as needed for nausea 20 tablet 0     order for DME Equipment being ordered: diabetic shoes 1 each 0     predniSONE (DELTASONE) 5 MG tablet TAKE ONE TABLET BY MOUTH EVERY MORNING AND TAKE ONE-HALF TABLET BY MOUTH EVERY EVENING 45 tablet 12     rosuvastatin (CRESTOR) 40 MG tablet Take 20mg (half tablet) three times weekly 90 tablet 3     sildenafil (VIAGRA) 25 MG tablet Take 1 tablet (25 mg) by mouth as needed (as needed) 32 tablet 11     tacrolimus (GENERIC EQUIVALENT) 0.5 MG capsule TAKE ONE CAPSULE BY MOUTH EVERY MORNING. TOTAL DOSE: 2.5MG IN THE MORNING & 2MG IN THE EVENING 30 capsule 11     tacrolimus (GENERIC EQUIVALENT) 1 MG capsule Take 2 capsules (2 mg) by mouth 2 times daily Total dose is 2.5mg am and 2mg pm 120 capsule 11     valGANciclovir (VALCYTE) 450 MG tablet TAKE ONE TABLETS (450MG) BY MOUTH TWO TIMES A DAY 60 tablet 11       Allergies   Allergen Reactions     Heparin (Bovine)      Other reaction(s): Thrombocytopenia, Thromboembolic Event     Heparin Cross Reactors Other (See Comments)     HIT positive and AUGUST positive      Oxycodone Other (See Comments)     Significant lethargy, confusion. Tolerates dilaudid well.      Fluocinolone Unknown and Other (See Comments)     Tendon problems  Tendon problems     Levaquin      Pneumococcal Vaccine Other (See Comments)     Other reaction(s): Fever  \"My arm swelled up like a balloon.\"       ROS:  Pertinent positives and negatives are noted in HPI.    EXAM:  /70   Pulse 67   Temp 97.2  F (36.2  C) (Temporal)   Resp 16   Wt 76.1 kg (167 lb 12.8 oz)   SpO2 98%   BMI 27.08 kg/m    General appearance: well appearing male in no acute distress  Dermatological: U-shaped laceration to left calf, no erythema.  Scant serous drainage noted  Psychological: normal affect, alert and pleasant      ASSESSMENT AND PLAN:    1. Laceration of left calf      Areas cleansed with alcohol wipe and sutures " removed without difficulty.  Steri-Strips were placed and bandage of gauze for protection.  Recommend daily dressing changes.  Follow-up as needed.    BRANDI Manning CNP..................9/1/2020 9:33 AM      This document was prepared using voice generated software.  While every attempt was made for accuracy, grammatical errors may exist.

## 2020-09-01 NOTE — NURSING NOTE
"Chief Complaint   Patient presents with     RECHECK     Suture removal of  left leg       Patient presents today in clinic for the following suture removal, left leg injury.    Initial /70   Pulse 67   Temp 97.2  F (36.2  C) (Temporal)   Resp 16   Wt 76.1 kg (167 lb 12.8 oz)   SpO2 98%   BMI 27.08 kg/m   Estimated body mass index is 27.08 kg/m  as calculated from the following:    Height as of 8/20/20: 1.676 m (5' 6\").    Weight as of this encounter: 76.1 kg (167 lb 12.8 oz).  Medication Reconciliation: complete    IGNACIA LLANES, LPN  "

## 2020-09-11 ENCOUNTER — OFFICE VISIT (OUTPATIENT)
Dept: FAMILY MEDICINE | Facility: OTHER | Age: 65
End: 2020-09-11
Attending: NURSE PRACTITIONER
Payer: MEDICARE

## 2020-09-11 VITALS
WEIGHT: 167.4 LBS | RESPIRATION RATE: 16 BRPM | DIASTOLIC BLOOD PRESSURE: 78 MMHG | TEMPERATURE: 97.8 F | OXYGEN SATURATION: 98 % | SYSTOLIC BLOOD PRESSURE: 142 MMHG | BODY MASS INDEX: 27.02 KG/M2 | HEART RATE: 69 BPM

## 2020-09-11 DIAGNOSIS — Z94.2 S/P LUNG TRANSPLANT (H): Primary | Chronic | ICD-10-CM

## 2020-09-11 DIAGNOSIS — L03.116 CELLULITIS OF LEFT LOWER EXTREMITY: Primary | ICD-10-CM

## 2020-09-11 PROBLEM — I95.9 SEPSIS ASSOCIATED HYPOTENSION (H): Status: RESOLVED | Noted: 2019-02-24 | Resolved: 2020-09-11

## 2020-09-11 PROBLEM — K21.9 ESOPHAGEAL REFLUX: Status: RESOLVED | Noted: 2018-01-31 | Resolved: 2020-09-11

## 2020-09-11 PROBLEM — A41.9 SEPSIS ASSOCIATED HYPOTENSION (H): Status: RESOLVED | Noted: 2019-02-24 | Resolved: 2020-09-11

## 2020-09-11 PROBLEM — S81.809A WOUND OF LOWER EXTREMITY: Status: RESOLVED | Noted: 2017-10-03 | Resolved: 2020-09-11

## 2020-09-11 PROCEDURE — 87077 CULTURE AEROBIC IDENTIFY: CPT | Mod: ZL | Performed by: NURSE PRACTITIONER

## 2020-09-11 PROCEDURE — 87070 CULTURE OTHR SPECIMN AEROBIC: CPT | Mod: ZL | Performed by: NURSE PRACTITIONER

## 2020-09-11 PROCEDURE — 25000128 H RX IP 250 OP 636: Performed by: NURSE PRACTITIONER

## 2020-09-11 PROCEDURE — 96372 THER/PROPH/DIAG INJ SC/IM: CPT

## 2020-09-11 PROCEDURE — G0463 HOSPITAL OUTPT CLINIC VISIT: HCPCS

## 2020-09-11 PROCEDURE — G0463 HOSPITAL OUTPT CLINIC VISIT: HCPCS | Mod: 25

## 2020-09-11 PROCEDURE — 99213 OFFICE O/P EST LOW 20 MIN: CPT | Performed by: NURSE PRACTITIONER

## 2020-09-11 RX ORDER — CIPROFLOXACIN 500 MG/1
500 TABLET, FILM COATED ORAL 2 TIMES DAILY
Qty: 20 TABLET | Refills: 0 | Status: SHIPPED | OUTPATIENT
Start: 2020-09-11 | End: 2020-09-14

## 2020-09-11 RX ORDER — SULFAMETHOXAZOLE AND TRIMETHOPRIM 400; 80 MG/1; MG/1
1 TABLET ORAL
Qty: 13 TABLET | Refills: 11 | Status: SHIPPED | OUTPATIENT
Start: 2020-09-11 | End: 2021-08-19

## 2020-09-11 RX ORDER — CEFTRIAXONE SODIUM 1 G
1 VIAL (EA) INJECTION ONCE
Status: COMPLETED | OUTPATIENT
Start: 2020-09-11 | End: 2020-09-11

## 2020-09-11 RX ORDER — MULTIPLE VITAMINS W/ MINERALS TAB 9MG-400MCG
1 TAB ORAL
COMMUNITY
End: 2021-01-08

## 2020-09-11 RX ADMIN — CEFTRIAXONE SODIUM 1 G: 1 INJECTION, POWDER, FOR SOLUTION INTRAMUSCULAR; INTRAVENOUS at 09:19

## 2020-09-11 ASSESSMENT — PAIN SCALES - GENERAL: PAINLEVEL: NO PAIN (0)
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
## 2020-09-11 NOTE — NURSING NOTE
"Chief Complaint   Patient presents with     Infection     Left leg infection       Patient presents today in clinic for the following left leg infection.    Initial BP (!) 142/78   Pulse 69   Temp 97.8  F (36.6  C)   Resp 16   Wt 75.9 kg (167 lb 6.4 oz)   SpO2 98%   BMI 27.02 kg/m   Estimated body mass index is 27.02 kg/m  as calculated from the following:    Height as of 8/20/20: 1.676 m (5' 6\").    Weight as of this encounter: 75.9 kg (167 lb 6.4 oz).  Medication Reconciliation: complete    MARIO ALBERTO, FLINCK, LPN  "

## 2020-09-11 NOTE — TELEPHONE ENCOUNTER
Requesting bactrim refill, didn't see on med list  Thank you!  Hetal August Kirill  [facility]

## 2020-09-13 LAB
BACTERIA SPEC CULT: ABNORMAL
BACTERIA SPEC CULT: ABNORMAL
SPECIMEN SOURCE: ABNORMAL

## 2020-09-14 ENCOUNTER — TELEPHONE (OUTPATIENT)
Dept: FAMILY MEDICINE | Facility: OTHER | Age: 65
End: 2020-09-14

## 2020-09-14 DIAGNOSIS — L03.116 CELLULITIS OF LEFT LOWER EXTREMITY: Primary | ICD-10-CM

## 2020-09-14 NOTE — TELEPHONE ENCOUNTER
Wound culture is not sensitive to ciprofloxacin.  My chart message was sent in a new prescription for Augmentin sent to pharmacy.BRANDI Manning CNP on 9/14/2020 at 7:50 AM

## 2020-09-16 ENCOUNTER — MYC MEDICAL ADVICE (OUTPATIENT)
Dept: FAMILY MEDICINE | Facility: OTHER | Age: 65
End: 2020-09-16

## 2020-09-16 NOTE — TELEPHONE ENCOUNTER
Please put in a result note so we can get back to this patient on the next steps to take. Thank you.  Miguel Boyd LPN on 9/16/2020 at 4:31 PM

## 2020-10-01 ENCOUNTER — TELEPHONE (OUTPATIENT)
Dept: TRANSPLANT | Facility: CLINIC | Age: 65
End: 2020-10-01

## 2020-10-01 NOTE — LETTER
PHYSICIAN ORDERS      DATE & TIME ISSUED: 2020 4:04 PM  PATIENT NAME: Aubrey Duncan   : 1955     [facility] MR# [if applicable]: [id number]     DIAGNOSIS:  Lung Transplant  Z94.2    2 view chest x-ray  DEXA hip/pelvis/spine    Pulmonary function test:  Spirometry, breathing capacity  Diffusing capacity  Vital capacity total    Any questions please call: Svitlana     Please fax these results to [phone].      .

## 2020-10-01 NOTE — TELEPHONE ENCOUNTER
On call coordinator received call from patient requesting to know if he was to keep his appointment scheduled for Dr Broussard on 10/07/2020. Reported to patient appointment with additional testing was still scheduled in 10/07/2020 in his chart. Plan made to send message to post lung transplant coordinators to determine plan of care. Patient agreeable with plan.

## 2020-10-01 NOTE — LETTER
PHYSICIAN ORDERS      DATE & TIME ISSUED: 2020 3:59 PM  PATIENT NAME: Aubrey Duncan   : 1955     [facility] MR# [if applicable]: [id number]     DIAGNOSIS:  Lung Transplant  Z94.2  10/2020 labs:  CMP, magnesium, phosphorus, CBC with platelets and diff, INR, lipid panel, vitamin D level, CMV DNA quant, EBV DNA quant, PRA donor specific antibody, tacrolimus level, testosterone total, hemoglobin A1C      Any questions please call: Svitlana     Please fax these results to [phone].      .

## 2020-10-01 NOTE — LETTER
PHYSICIAN ORDERS      DATE & TIME ISSUED: 2020 3:59 PM  PATIENT NAME: Aubrey Duncan   : 1955     [facility] MR# [if applicable]: [id number]     DIAGNOSIS:  Lung Transplant  Z94.2  10/2020 labs:  CMP, magnesium, phosphorus, CBC with platelets and diff, INR, lipid panel, vitamin D level, CMV DNA quant, EBV DNA quant, PRA donor specific antibody, tacrolimus level, testosterone total, hemoglobin A1C      Any questions please call: Svitlana     Please fax these results to [phone].      .

## 2020-10-01 NOTE — LETTER
PHYSICIAN ORDERS      DATE & TIME ISSUED: 2020 4:04 PM  PATIENT NAME: Aubrey Duncan   : 1955     [facility] MR# [if applicable]: [id number]     DIAGNOSIS:  Lung Transplant  Z94.2    2 view chest x-ray  DEXA hip/pelvis/spine    Pulmonary function test:  Spirometry, breathing capacity  Diffusing capacity  Vital capacity total    Any questions please call: Svitlana     Please fax these results to [phone].      .

## 2020-10-01 NOTE — TELEPHONE ENCOUNTER
Patient Call: General  Route to LPN    Reason for call: Please connect with pt regarding these appointments     Call back needed? Yes    Return Call Needed  Same as documented in contacts section  When to return call?: Greater than one day: Route standard priority

## 2020-10-01 NOTE — TELEPHONE ENCOUNTER
Patient will do testing locally at [facility] then do a phone call with Dr. Broussard. If patient is unable to get testing done before 10/7, will push visit back a week or so. Patient reports he is doing great. Orders sent to [facility].

## 2020-10-02 ENCOUNTER — TELEPHONE (OUTPATIENT)
Dept: TRANSPLANT | Facility: CLINIC | Age: 65
End: 2020-10-02

## 2020-10-02 NOTE — TELEPHONE ENCOUNTER
Provider Call: General    Reason for call: Nikole left a message because Aubrey, patient of Dr. Broussard, has orders which  and she can't get him in before 11/3. Please extend orders for 6 minute walk and bone density test for Aubrey so she can get those scheduled. His wife just called to schedule those. Thank you.    Call back needed? Yes    Return Call Needed  Same as documented in contacts section  When to return call?: Same day: Route High Priority

## 2020-10-02 NOTE — TELEPHONE ENCOUNTER
Patient's wife calls reporting [facility] isn't able to do PFTs and DEXA until early November. Will have patient do all testing early November and push visit with Dr. Broussard to 11/5 as he is doing well.

## 2020-10-05 ENCOUNTER — MYC MEDICAL ADVICE (OUTPATIENT)
Dept: FAMILY MEDICINE | Facility: OTHER | Age: 65
End: 2020-10-05

## 2020-10-05 DIAGNOSIS — E11.9 DIABETES MELLITUS TYPE 2, DIET-CONTROLLED (H): ICD-10-CM

## 2020-10-05 NOTE — TELEPHONE ENCOUNTER
Sending to team-let member as PCP is out of office.  Isatu Rahman LPN ....................  10/5/2020   11:28 AM

## 2020-10-07 DIAGNOSIS — Z94.2 LUNG TRANSPLANT STATUS, BILATERAL (H): ICD-10-CM

## 2020-10-07 RX ORDER — PREDNISONE 5 MG/1
TABLET ORAL
Qty: 45 TABLET | Refills: 12 | Status: SHIPPED | OUTPATIENT
Start: 2020-10-07 | End: 2021-10-15

## 2020-10-13 ENCOUNTER — MYC MEDICAL ADVICE (OUTPATIENT)
Dept: FAMILY MEDICINE | Facility: OTHER | Age: 65
End: 2020-10-13

## 2020-10-16 ENCOUNTER — OFFICE VISIT (OUTPATIENT)
Dept: FAMILY MEDICINE | Facility: OTHER | Age: 65
End: 2020-10-16
Attending: NURSE PRACTITIONER
Payer: MEDICARE

## 2020-10-16 VITALS
BODY MASS INDEX: 26.09 KG/M2 | TEMPERATURE: 96.8 F | HEIGHT: 67 IN | WEIGHT: 166.2 LBS | OXYGEN SATURATION: 96 % | DIASTOLIC BLOOD PRESSURE: 80 MMHG | RESPIRATION RATE: 16 BRPM | HEART RATE: 70 BPM | SYSTOLIC BLOOD PRESSURE: 138 MMHG

## 2020-10-16 DIAGNOSIS — S81.802D OPEN WOUND OF LEFT LOWER EXTREMITY, SUBSEQUENT ENCOUNTER: Primary | ICD-10-CM

## 2020-10-16 DIAGNOSIS — E11.9 DIABETES MELLITUS TYPE 2, DIET-CONTROLLED (H): ICD-10-CM

## 2020-10-16 PROCEDURE — 99213 OFFICE O/P EST LOW 20 MIN: CPT | Performed by: NURSE PRACTITIONER

## 2020-10-16 PROCEDURE — 87070 CULTURE OTHR SPECIMN AEROBIC: CPT | Mod: ZL | Performed by: NURSE PRACTITIONER

## 2020-10-16 PROCEDURE — G0463 HOSPITAL OUTPT CLINIC VISIT: HCPCS

## 2020-10-16 PROCEDURE — 87077 CULTURE AEROBIC IDENTIFY: CPT | Mod: ZL | Performed by: NURSE PRACTITIONER

## 2020-10-16 ASSESSMENT — MIFFLIN-ST. JEOR: SCORE: 1487.51

## 2020-10-16 ASSESSMENT — PAIN SCALES - GENERAL: PAINLEVEL: NO PAIN (1)
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
## 2020-10-16 NOTE — NURSING NOTE
"Chief Complaint   Patient presents with     RECHECK     Wound left leg         Initial /80   Pulse 70   Temp 96.8  F (36  C) (Temporal)   Resp 16   Ht 1.702 m (5' 7\")   Wt 75.4 kg (166 lb 3.2 oz)   SpO2 96%   BMI 26.03 kg/m   Estimated body mass index is 26.03 kg/m  as calculated from the following:    Height as of this encounter: 1.702 m (5' 7\").    Weight as of this encounter: 75.4 kg (166 lb 3.2 oz).    Medication Reconciliation: complete      Norma J. Gosselin, LPN  "

## 2020-10-18 LAB
BACTERIA SPEC CULT: ABNORMAL
SPECIMEN SOURCE: ABNORMAL

## 2020-10-19 ENCOUNTER — TELEPHONE (OUTPATIENT)
Dept: FAMILY MEDICINE | Facility: OTHER | Age: 65
End: 2020-10-19

## 2020-10-19 ENCOUNTER — TELEPHONE (OUTPATIENT)
Dept: TRANSPLANT | Facility: CLINIC | Age: 65
End: 2020-10-19

## 2020-10-19 DIAGNOSIS — S81.802D OPEN WOUND OF LEFT LOWER EXTREMITY, SUBSEQUENT ENCOUNTER: Primary | ICD-10-CM

## 2020-10-19 RX ORDER — CIPROFLOXACIN 500 MG/1
500 TABLET, FILM COATED ORAL 2 TIMES DAILY
Qty: 14 TABLET | Refills: 0 | Status: SHIPPED | OUTPATIENT
Start: 2020-10-19 | End: 2020-10-26

## 2020-10-19 NOTE — TELEPHONE ENCOUNTER
Appears he should be started on antibiotics. Cipro 500 mg BID x 7 days sent to pharmacy at [facility] to get it started.   He is listed as allergic to Levaquin. The cipro is similar. If he cannot take cipro, every other drug to treat this is IV.  With prednisone he is at greater risk for tendon rupture, so needs to avoid lifting heavy objects or doing things that put a lot of stress on joints for a few months.

## 2020-10-19 NOTE — TELEPHONE ENCOUNTER
"----- Message from Brooke Nicholas RN sent at 10/19/2020  8:37 AM CDT -----  Dr. Cornejo, Please address as teamlet. Hoa Olivarez is scheduled to return tomorrow. Patient saw Hoa Olivarez on 10/16, for open wound on left lower extremity. Per note, \"no diagnosis found\" and no new antibiotics prescribed. Noted Pt takes the following, 3 times a week, due to lung transplant: sulfamethoxazole-trimethoprim (BACTRIM) 400-80 MG tablet. Most recent prescriptions sent to [facility] - [place], [place] - [address] [place] [address] [facility] Please note allergies. Thank you, Brooke Nicholas RN .............. 10/19/2020  8:36 AM    "

## 2020-10-19 NOTE — TELEPHONE ENCOUNTER
Patient calls to say he continues to have leg infection. It was recently swabbed again and has pseudomonas growing. Patient called say his PCP is out of the office and if we would like to start him on an antibiotic. Informed patient that his PCP office already ordered him cipro 500mg BID x 7 days. If there are any issues, he should contact PCP office.     Patient verbalized understanding and agreement of plan. Will call with further questions/concerns.

## 2020-10-20 NOTE — TELEPHONE ENCOUNTER
Patient did  the prescription for Cipro, I told him to notify us if he had any issued with it. He did state that it usually takes more than 7 days worth on antibiotics for him, so I told him to call back after about 6 days with an update as to how he is doing and we would go from there.   Monae Musa LPN.......  10/20/2020  11:41 AM

## 2020-10-22 ENCOUNTER — TELEPHONE (OUTPATIENT)
Dept: FAMILY MEDICINE | Facility: OTHER | Age: 65
End: 2020-10-22

## 2020-10-22 NOTE — TELEPHONE ENCOUNTER
Please share NPI# and clarify concerns. If needs to be refaxed, please get the fax sent to me for completions. BRANDI Manning CNP on 10/22/2020 at 3:33 PM

## 2020-10-22 NOTE — TELEPHONE ENCOUNTER
Roberta of [employer] called stating the strength and formulary is not on the order and the NPI # is not that of DMO's and needs to be corrected.    On hold >10 minutes, in attempt to return for call for more information (including medication they are referring to). Writer will try again at a later time.     Brooke Nicholas RN .............. 10/22/2020  11:26 AM

## 2020-10-22 NOTE — TELEPHONE ENCOUNTER
States that the strength and formulary is not on the order and the NPI # is not that of DMO's and needs to be corrected.

## 2020-10-22 NOTE — TELEPHONE ENCOUNTER
Called and spoke with Chelo at [facility] Patient assistance program, after verifying Pt's last name and . She states this is in regards to Novolog prescription, however she would not share any further details regarding the order, until provider's NPI # was verified.     NPI # was not shared with caller.     Routing to Hoa Olivarez for review.     Brooke Nicholas RN .............. 10/22/2020  3:27 PM

## 2020-10-22 NOTE — TELEPHONE ENCOUNTER
Company is going to fax paperwork again. Incorrect NPI number was filled out last time.  Isatu Rahman LPN ....................  10/22/2020   3:49 PM

## 2020-10-26 ENCOUNTER — TELEPHONE (OUTPATIENT)
Dept: FAMILY MEDICINE | Facility: OTHER | Age: 65
End: 2020-10-26

## 2020-10-26 DIAGNOSIS — S81.802D OPEN WOUND OF LEFT LOWER EXTREMITY, SUBSEQUENT ENCOUNTER: ICD-10-CM

## 2020-10-26 RX ORDER — CIPROFLOXACIN 500 MG/1
500 TABLET, FILM COATED ORAL 2 TIMES DAILY
Qty: 14 TABLET | Refills: 0 | Status: SHIPPED | OUTPATIENT
Start: 2020-10-26 | End: 2021-01-08

## 2020-10-26 NOTE — TELEPHONE ENCOUNTER
Called [facility] and delivery of Lantus will be at the latest 10/29/20.  Patient notified by phone.  Norma J. Gosselin, LPN .......  10/26/2020  11:10 AM

## 2020-10-26 NOTE — TELEPHONE ENCOUNTER
I have not heard anything further on the refill of lantus. BRANDI Manning CNP on 10/26/2020 at 10:53 AM

## 2020-10-26 NOTE — TELEPHONE ENCOUNTER
After verifying patient's name and date of birth, patient was given the below information.  Norma J. Gosselin, LPN....10/26/2020 10:49 AM    Did you hear back about the lantus?

## 2020-10-26 NOTE — TELEPHONE ENCOUNTER
A second round of Cipro is sent in. Please let him know if he is not showing any improvement, I would like to have him evaluated again in clinic, would consider referral to Cora Darling NP. BRANDI Manning CNP on 10/26/2020 at 10:33 AM

## 2020-10-26 NOTE — TELEPHONE ENCOUNTER
Patient needs more Cipro  - cut on leg not better - please call to advise.  Monica Mendez on 10/26/2020 at 10:24 AM

## 2020-10-30 ENCOUNTER — RESULTS ONLY (OUTPATIENT)
Dept: OTHER | Facility: CLINIC | Age: 65
End: 2020-10-30

## 2020-10-30 ENCOUNTER — HOSPITAL ENCOUNTER (OUTPATIENT)
Dept: GENERAL RADIOLOGY | Facility: OTHER | Age: 65
End: 2020-10-30
Attending: INTERNAL MEDICINE
Payer: MEDICARE

## 2020-10-30 ENCOUNTER — ALLIED HEALTH/NURSE VISIT (OUTPATIENT)
Dept: FAMILY MEDICINE | Facility: OTHER | Age: 65
End: 2020-10-30
Attending: INTERNAL MEDICINE
Payer: MEDICARE

## 2020-10-30 ENCOUNTER — TELEPHONE (OUTPATIENT)
Dept: TRANSPLANT | Facility: CLINIC | Age: 65
End: 2020-10-30

## 2020-10-30 DIAGNOSIS — Z20.822 COVID-19 RULED OUT: Primary | ICD-10-CM

## 2020-10-30 DIAGNOSIS — Z94.2 LUNG REPLACED BY TRANSPLANT (H): Primary | ICD-10-CM

## 2020-10-30 DIAGNOSIS — E88.01 ALPHA-1-ANTITRYPSIN DEFICIENCY (H): Chronic | ICD-10-CM

## 2020-10-30 DIAGNOSIS — T38.0X5A STEROID-INDUCED DIABETES MELLITUS (H): ICD-10-CM

## 2020-10-30 DIAGNOSIS — Z94.2 S/P LUNG TRANSPLANT (H): Chronic | ICD-10-CM

## 2020-10-30 DIAGNOSIS — K21.9 GASTROESOPHAGEAL REFLUX DISEASE: ICD-10-CM

## 2020-10-30 DIAGNOSIS — E11.9 DIABETES MELLITUS TYPE 2, DIET-CONTROLLED (H): ICD-10-CM

## 2020-10-30 DIAGNOSIS — K57.30 DIVERTICULOSIS OF LARGE INTESTINE WITHOUT HEMORRHAGE: ICD-10-CM

## 2020-10-30 DIAGNOSIS — E83.42 HYPOMAGNESEMIA: Primary | ICD-10-CM

## 2020-10-30 DIAGNOSIS — Z79.899 ENCOUNTER FOR LONG-TERM CURRENT USE OF MEDICATION: ICD-10-CM

## 2020-10-30 DIAGNOSIS — M85.9 LOW BONE DENSITY: ICD-10-CM

## 2020-10-30 DIAGNOSIS — E09.9 STEROID-INDUCED DIABETES MELLITUS (H): ICD-10-CM

## 2020-10-30 LAB
ALBUMIN SERPL-MCNC: 3.9 G/DL (ref 3.5–5.7)
ALP SERPL-CCNC: 41 U/L (ref 34–104)
ALT SERPL W P-5'-P-CCNC: 71 U/L (ref 7–52)
ANION GAP SERPL CALCULATED.3IONS-SCNC: 9 MMOL/L (ref 3–14)
AST SERPL W P-5'-P-CCNC: 42 U/L (ref 13–39)
BASOPHILS # BLD AUTO: 0 10E9/L (ref 0–0.2)
BASOPHILS NFR BLD AUTO: 0.5 %
BILIRUB SERPL-MCNC: 0.5 MG/DL (ref 0.3–1)
BUN SERPL-MCNC: 34 MG/DL (ref 7–25)
CALCIUM SERPL-MCNC: 8.9 MG/DL (ref 8.6–10.3)
CHLORIDE SERPL-SCNC: 108 MMOL/L (ref 98–107)
CHOLEST SERPL-MCNC: 143 MG/DL
CO2 SERPL-SCNC: 24 MMOL/L (ref 21–31)
CREAT SERPL-MCNC: 1.22 MG/DL (ref 0.7–1.3)
DEPRECATED CALCIDIOL+CALCIFEROL SERPL-MC: 37 NG/ML
DIFFERENTIAL METHOD BLD: ABNORMAL
EOSINOPHIL # BLD AUTO: 0.2 10E9/L (ref 0–0.7)
EOSINOPHIL NFR BLD AUTO: 2.3 %
ERYTHROCYTE [DISTWIDTH] IN BLOOD BY AUTOMATED COUNT: 13.1 % (ref 10–15)
GFR SERPL CREATININE-BSD FRML MDRD: 59 ML/MIN/{1.73_M2}
GLUCOSE SERPL-MCNC: 91 MG/DL (ref 70–105)
HBA1C MFR BLD: 5.1 % (ref 4–6)
HCT VFR BLD AUTO: 41 % (ref 40–53)
HDLC SERPL-MCNC: 46 MG/DL (ref 23–92)
HGB BLD-MCNC: 13.2 G/DL (ref 13.3–17.7)
IMM GRANULOCYTES # BLD: 0.1 10E9/L (ref 0–0.4)
IMM GRANULOCYTES NFR BLD: 0.9 %
INR PPP: 1 (ref 0.86–1.14)
LDLC SERPL CALC-MCNC: 62 MG/DL
LYMPHOCYTES # BLD AUTO: 2.1 10E9/L (ref 0.8–5.3)
LYMPHOCYTES NFR BLD AUTO: 27.9 %
MAGNESIUM SERPL-MCNC: 1.5 MG/DL (ref 1.9–2.7)
MCH RBC QN AUTO: 33.3 PG (ref 26.5–33)
MCHC RBC AUTO-ENTMCNC: 32.2 G/DL (ref 31.5–36.5)
MCV RBC AUTO: 104 FL (ref 78–100)
MONOCYTES # BLD AUTO: 0.5 10E9/L (ref 0–1.3)
MONOCYTES NFR BLD AUTO: 6.3 %
NEUTROPHILS # BLD AUTO: 4.6 10E9/L (ref 1.6–8.3)
NEUTROPHILS NFR BLD AUTO: 62.1 %
NONHDLC SERPL-MCNC: 97 MG/DL
PHOSPHATE SERPL-MCNC: 4.1 MG/DL (ref 2.5–5)
PLATELET # BLD AUTO: 195 10E9/L (ref 150–450)
POTASSIUM SERPL-SCNC: 5.1 MMOL/L (ref 3.5–5.1)
PROT SERPL-MCNC: 6.9 G/DL (ref 6.4–8.9)
RBC # BLD AUTO: 3.96 10E12/L (ref 4.4–5.9)
SODIUM SERPL-SCNC: 141 MMOL/L (ref 134–144)
TESTOST SERPL-MCNC: 383 NG/DL (ref 175–781)
TRIGL SERPL-MCNC: 175 MG/DL
WBC # BLD AUTO: 7.4 10E9/L (ref 4–11)

## 2020-10-30 PROCEDURE — C9803 HOPD COVID-19 SPEC COLLECT: HCPCS

## 2020-10-30 PROCEDURE — 36415 COLL VENOUS BLD VENIPUNCTURE: CPT | Mod: ZL | Performed by: INTERNAL MEDICINE

## 2020-10-30 PROCEDURE — 83036 HEMOGLOBIN GLYCOSYLATED A1C: CPT | Performed by: INTERNAL MEDICINE

## 2020-10-30 PROCEDURE — 83735 ASSAY OF MAGNESIUM: CPT | Performed by: INTERNAL MEDICINE

## 2020-10-30 PROCEDURE — 84403 ASSAY OF TOTAL TESTOSTERONE: CPT | Mod: ZL | Performed by: INTERNAL MEDICINE

## 2020-10-30 PROCEDURE — 71046 X-RAY EXAM CHEST 2 VIEWS: CPT

## 2020-10-30 PROCEDURE — 86833 HLA CLASS II HIGH DEFIN QUAL: CPT | Mod: ZL | Performed by: NURSE PRACTITIONER

## 2020-10-30 PROCEDURE — 82306 VITAMIN D 25 HYDROXY: CPT | Mod: ZL | Performed by: INTERNAL MEDICINE

## 2020-10-30 PROCEDURE — U0003 INFECTIOUS AGENT DETECTION BY NUCLEIC ACID (DNA OR RNA); SEVERE ACUTE RESPIRATORY SYNDROME CORONAVIRUS 2 (SARS-COV-2) (CORONAVIRUS DISEASE [COVID-19]), AMPLIFIED PROBE TECHNIQUE, MAKING USE OF HIGH THROUGHPUT TECHNOLOGIES AS DESCRIBED BY CMS-2020-01-R: HCPCS | Mod: ZL | Performed by: INTERNAL MEDICINE

## 2020-10-30 PROCEDURE — 85025 COMPLETE CBC W/AUTO DIFF WBC: CPT | Performed by: INTERNAL MEDICINE

## 2020-10-30 PROCEDURE — 87799 DETECT AGENT NOS DNA QUANT: CPT | Mod: ZL | Performed by: INTERNAL MEDICINE

## 2020-10-30 PROCEDURE — 99207 PR NO CHARGE NURSE ONLY: CPT

## 2020-10-30 PROCEDURE — 86832 HLA CLASS I HIGH DEFIN QUAL: CPT | Mod: ZL | Performed by: NURSE PRACTITIONER

## 2020-10-30 PROCEDURE — 80061 LIPID PANEL: CPT | Mod: ZL | Performed by: INTERNAL MEDICINE

## 2020-10-30 PROCEDURE — 85610 PROTHROMBIN TIME: CPT | Mod: ZL | Performed by: INTERNAL MEDICINE

## 2020-10-30 PROCEDURE — 84100 ASSAY OF PHOSPHORUS: CPT | Performed by: INTERNAL MEDICINE

## 2020-10-30 PROCEDURE — 80197 ASSAY OF TACROLIMUS: CPT | Mod: ZL | Performed by: INTERNAL MEDICINE

## 2020-10-30 PROCEDURE — 80053 COMPREHEN METABOLIC PANEL: CPT | Performed by: INTERNAL MEDICINE

## 2020-10-30 NOTE — LETTER
PHYSICIAN ORDERS  DATE & TIME ISSUED: 2020 7:49 AM  PATIENT NAME: Aubrey Duncan   : 1955     [facility] MR# [if applicable]: [id number]     DIAGNOSIS:  Lung Transplant  Z94.2   Complete the following tests in 2021  Basic Metabolic Panel, CMV DNA quantification  CBC with platelets, 12 hour Tacrolimus trough level    In May of 2021 complete the following test  2 view chest x-ray  Pulmonary function test:  Spirometry, breathing capacity  Diffusing capacity  Vital capacity total  Labs  Basic metabolic panel,Magnesium level,CBC with platelets  CMV DNA quantification,Tacrolimus 12 hour trough level.    Any questions please call:  [phone]    Please fax these results to [phone].      .

## 2020-10-30 NOTE — TELEPHONE ENCOUNTER
Labs review and magnesium level low at 1.5 it has been drifting down over last several months. Patient repots he is taking a multi vit only and will start taking a magnesium supplement at home.

## 2020-10-30 NOTE — LETTER
PHYSICIAN ORDERS      DATE & TIME ISSUED: 2020 7:42 AM  PATIENT NAME: Aubrey Duncan   : 1955     [facility] MR# [if applicable]: [id number]     DIAGNOSIS:  Lung Transplant  Z94.2   Please draw the following labs in the month of 2021:  Basic Metabolic Panel  CBC with platelets  Tacrolimus 12 hour trough level  CMV DNA quantification    Any questions please call: [phone]    Please fax these results to [phone].      .

## 2020-10-31 LAB
SARS-COV-2 RNA SPEC QL NAA+PROBE: NOT DETECTED
SPECIMEN SOURCE: NORMAL
TACROLIMUS BLD-MCNC: 8 UG/L (ref 5–15)
TME LAST DOSE: NORMAL H

## 2020-11-02 ENCOUNTER — HOSPITAL ENCOUNTER (OUTPATIENT)
Dept: RESPIRATORY THERAPY | Facility: OTHER | Age: 65
End: 2020-11-02
Attending: INTERNAL MEDICINE
Payer: MEDICARE

## 2020-11-02 ENCOUNTER — TELEPHONE (OUTPATIENT)
Dept: FAMILY MEDICINE | Facility: OTHER | Age: 65
End: 2020-11-02

## 2020-11-02 DIAGNOSIS — T38.0X5A STEROID-INDUCED DIABETES MELLITUS (H): ICD-10-CM

## 2020-11-02 DIAGNOSIS — Z94.2 S/P LUNG TRANSPLANT (H): Chronic | ICD-10-CM

## 2020-11-02 DIAGNOSIS — E09.9 STEROID-INDUCED DIABETES MELLITUS (H): ICD-10-CM

## 2020-11-02 DIAGNOSIS — K21.9 GASTROESOPHAGEAL REFLUX DISEASE: ICD-10-CM

## 2020-11-02 DIAGNOSIS — E88.01 ALPHA-1-ANTITRYPSIN DEFICIENCY (H): Chronic | ICD-10-CM

## 2020-11-02 DIAGNOSIS — K57.30 DIVERTICULOSIS OF LARGE INTESTINE WITHOUT HEMORRHAGE: ICD-10-CM

## 2020-11-02 DIAGNOSIS — M85.9 LOW BONE DENSITY: ICD-10-CM

## 2020-11-02 DIAGNOSIS — Z94.2 LUNG REPLACED BY TRANSPLANT (H): ICD-10-CM

## 2020-11-02 DIAGNOSIS — Z79.899 ENCOUNTER FOR LONG-TERM CURRENT USE OF MEDICATION: ICD-10-CM

## 2020-11-02 LAB
6 MIN WALK (FT): NORMAL
6 MIN WALK (M): NORMAL

## 2020-11-02 PROCEDURE — 999N000157 HC STATISTIC RCP TIME EA 10 MIN

## 2020-11-02 PROCEDURE — 94010 BREATHING CAPACITY TEST: CPT | Mod: 26

## 2020-11-02 PROCEDURE — 94010 BREATHING CAPACITY TEST: CPT | Mod: 26 | Performed by: INTERNAL MEDICINE

## 2020-11-02 PROCEDURE — 94729 DIFFUSING CAPACITY: CPT | Mod: 26 | Performed by: INTERNAL MEDICINE

## 2020-11-02 PROCEDURE — 94729 DIFFUSING CAPACITY: CPT

## 2020-11-02 PROCEDURE — 94726 PLETHYSMOGRAPHY LUNG VOLUMES: CPT

## 2020-11-02 PROCEDURE — 94618 PULMONARY STRESS TESTING: CPT

## 2020-11-02 PROCEDURE — 94726 PLETHYSMOGRAPHY LUNG VOLUMES: CPT | Mod: 26 | Performed by: INTERNAL MEDICINE

## 2020-11-02 NOTE — TELEPHONE ENCOUNTER
States she is calling regarding application for assistance. They said they are needing to hear back regarding corrections on the forms. Please call

## 2020-11-02 NOTE — PROGRESS NOTES
Provider entered charges for PFT not correct order.  Email was sent to provider but not corrected.  Rt entered correct orders for the procedure that charges were entered for.    Noy Pimentel, RT on 11/2/2020 at 2:59 PM

## 2020-11-02 NOTE — RESULT ENCOUNTER NOTE
Tacrolimus level 8.0 at 12 hours, on 10/30/20.  Goal 8-10.   Current dose 2.5 mg in AM, 2 mg in PM    No dose change at goal   Donews message sent

## 2020-11-03 ENCOUNTER — MYC MEDICAL ADVICE (OUTPATIENT)
Dept: FAMILY MEDICINE | Facility: OTHER | Age: 65
End: 2020-11-03

## 2020-11-03 LAB
DONOR IDENTIFICATION: NORMAL
DSA COMMENTS: NORMAL
DSA PRESENT: NO
DSA TEST METHOD: NORMAL
EBV DNA # SPEC NAA+PROBE: <500 {COPIES}/ML
EBV DNA SPEC NAA+PROBE-LOG#: <2.7 {LOG_COPIES}/ML
ORGAN: NORMAL
SA1 CELL: NORMAL
SA1 COMMENTS: NORMAL
SA1 HI RISK ABY: NORMAL
SA1 MOD RISK ABY: NORMAL
SA1 TEST METHOD: NORMAL
SA2 CELL: NORMAL
SA2 COMMENTS: NORMAL
SA2 HI RISK ABY UA: NORMAL
SA2 MOD RISK ABY: NORMAL
SA2 TEST METHOD: NORMAL
UNACCEPTABLE ANTIGEN: NORMAL
UNOS CPRA: 0

## 2020-11-03 NOTE — TELEPHONE ENCOUNTER
Spoke with a representative from [employer]. They have re faxed this form, and it was just faxed to out number 10 minutes prior to me calling them. Verified correct fax number.     On the form they need the correct NPI number-they are saying that the one given was incorrect for Hoa Olivarez. Please correct on form when received.     Also need to indicate on this form if the insulin is to be vial or flexpen. Please fax back to [phone], this will take about 24-48 hours to process when faxed, needs to be done before November 30th.     Did check inbox/outbox scans couldn't find the original form. Please correct when back in office.   Marisol Jaime LPN...................11/3/2020   10:32 AM

## 2020-11-04 ENCOUNTER — HOSPITAL ENCOUNTER (OUTPATIENT)
Dept: BONE DENSITY | Facility: OTHER | Age: 65
Discharge: HOME | End: 2020-11-04
Attending: INTERNAL MEDICINE | Admitting: FAMILY MEDICINE
Payer: MEDICARE

## 2020-11-04 DIAGNOSIS — Z79.52 LONG TERM (CURRENT) USE OF SYSTEMIC STEROIDS: ICD-10-CM

## 2020-11-04 DIAGNOSIS — Z94.2 S/P LUNG TRANSPLANT (H): Chronic | ICD-10-CM

## 2020-11-04 DIAGNOSIS — K21.9 GASTROESOPHAGEAL REFLUX DISEASE: ICD-10-CM

## 2020-11-04 DIAGNOSIS — Z79.899 ENCOUNTER FOR LONG-TERM CURRENT USE OF MEDICATION: ICD-10-CM

## 2020-11-04 DIAGNOSIS — E09.9 STEROID-INDUCED DIABETES MELLITUS (H): ICD-10-CM

## 2020-11-04 DIAGNOSIS — E11.9 DIABETES MELLITUS TYPE 2, DIET-CONTROLLED (H): ICD-10-CM

## 2020-11-04 DIAGNOSIS — E88.01 ALPHA-1-ANTITRYPSIN DEFICIENCY (H): Chronic | ICD-10-CM

## 2020-11-04 DIAGNOSIS — K57.30 DIVERTICULOSIS OF LARGE INTESTINE WITHOUT HEMORRHAGE: ICD-10-CM

## 2020-11-04 DIAGNOSIS — T38.0X5A STEROID-INDUCED DIABETES MELLITUS (H): ICD-10-CM

## 2020-11-04 DIAGNOSIS — M85.9 LOW BONE DENSITY: ICD-10-CM

## 2020-11-04 PROCEDURE — 77080 DXA BONE DENSITY AXIAL: CPT

## 2020-11-05 ENCOUNTER — VIRTUAL VISIT (OUTPATIENT)
Dept: TRANSPLANT | Facility: CLINIC | Age: 65
End: 2020-11-05
Attending: INTERNAL MEDICINE
Payer: MEDICARE

## 2020-11-05 DIAGNOSIS — Z94.2 S/P LUNG TRANSPLANT (H): Primary | ICD-10-CM

## 2020-11-05 PROCEDURE — 99442 PR PHYSICIAN TELEPHONE EVALUATION 11-20 MIN: CPT | Mod: 95 | Performed by: INTERNAL MEDICINE

## 2020-11-05 NOTE — PROGRESS NOTES
[facility]  Pulmonary Medicine/Lung Transplant  November 5, 2020         Today's visit note:       ASSESSMENT/PLAN:    # Status post bilateral lung transplant, performed in 09/2013 for alpha-1 antitrypsin deficiency emphysema. He is doing extremely well symptomatically.  CXR on 10/30/20 was unchanged from baseline, as were PFT and 6MW on 11/2/20.  His current immune suppressive medications are tacrolimus, Myfortic, and prednisone, which will be continued.     # Antimicrobial prophylaxis includes Bactrim 3 times a week and valcyte bid (recurrent CMV)    # Diabetes, under good control with Lantus and Novalog.    # Hypertension, under good control with furosemide  lisinopril and metoprolol-->continue    #  History of recurrent CMV viremia, being managed with long-term valganciclovir    #  Laceration on left calf, treated with cipro in mid-October-->patient reports that it is now healing nicely.    # History of squamous cell carcinoma on the left forearm treated with Mohs surgery in 06/2016.  He continues to follow up regularly with Dermatology.    # Healthcare maint: Rec'd flu vaccine on 10/5/20. TDAP 8/20/20. He had one dose of ShingRix but got a rash so has not had 2nd dose.    RTC 6 months in-person if COVID19 situation allows it--patient knows that I will be retiring from clinical practice soon and that he will be scheduled with another provider.     PATIENT PROFILE AND TRANSPLANT HISTORY:  Current age:                    65 year old  Underlying lung disease: Alpha - 1 - Antitrypsin Deficiency    Transplant date(s):        9/8/2013 (Lung)  Transplant POD(s):        2615  Lung transplant type(s): Bilateral Sequential Lung      Transplant coordinator:   Sanaz Lopez  Transplant provider(s):   Kirk Broussard    ALLERGIES/INTOLERANCES/SENSITIVITIES  Heparin (bovine), Heparin cross reactors, Oxycodone, Fluocinolone, Levaquin, and Pneumococcal vaccine    Active Patient Thresholds     Lab Low  "High Effective Since Comment    Tacrolimus Level 8 10 03/14/2019 [facility] 3/14/19          INTERVAL HISTORY:    --Transplant-related lab/procedure results  # Most recent resulted PRA: 10/30/20, neg for DSA  # Most recent bronchoscopy:>1 year  # Most recent transbronchial biopsy:>1 year  # Most recent blood CMV PCR: 2/19/20, neg  # Most recent blood EBV PCR: 10/30,20, neg    --Most recent lung transplant clinic visit: 4/16/20 (Aarti)    --Interval clinic visits, significant medical events (obtained by chart review and patient hx):    6/30/20: right shin laceration-->healed    8/4/20: \"lesion\" removed from right upper arm    8/20/20: Lacerated posterior aspect of left calf--> initially wasn't healing-->cipro prescribed-->now it is \"almost healed\"     --Today:    Mr. Beauchamp was evaluated today via telephone visit; also on the call was his wife, Kyung.  The patient reports that overall he is doing extremely well.  Specifically he is not short of breath with his daily activities including a lot of around the house chores.  He is not having productive cough, hemoptysis, chest pain, wheezing.    REVIEW OF SYSTEMS:  \#Appetite is good weight is stable    #The wound on his left posterior calf is \"almost healed\" and is no longer draining fluid    # Home BP are usually 130-150/80-90 in am before meds; 120/80 in the afternoon    #Complete review of systems was asked and was otherwise negative.    PHYSICAL EXAM: Patient sounded comfortable, coherent, and not short of breath.    I have reviewed and updated the patient's Past Medical History, Social History, Family History and Medication List.    Pulmonary function tests obtained at [facility] on 11/2/2020 were reviewed and interpreted by me.  Spirometry results are within normal limits with an FEV1 of 3.72 L and an FVC of 4.54 L (123 and 115% of predicted.  Lung volumes are also in expected limits other than a residual volume of 57% predicted.  When compared with this " patient's most recent previous tests there has been no significant change. 6-minute walk test was performed while the patient was breathing room air.  He walked a total of 1371 feet without stopping to rest.  SPO2 started at 97% and did not decrease significantly during the test.             Medications:     Current Outpatient Medications   Medication     albuterol (PROAIR HFA, PROVENTIL HFA, VENTOLIN HFA) 108 (90 BASE) MCG/ACT inhaler     azaTHIOprine (IMURAN) 50 MG tablet     blood glucose (NO BRAND SPECIFIED) test strip     calcium-vitamin D (CALTRATE) 600-400 MG-UNIT per tablet     ciprofloxacin (CIPRO) 500 MG tablet     ferrous sulfate (FEROSUL) 325 (65 Fe) MG tablet     fluorouracil (EFUDEX) 5 % cream     furosemide (LASIX) 20 MG tablet     insulin aspart (NOVOLOG PEN) 100 UNIT/ML pen     insulin glargine (LANTUS PEN) 100 UNIT/ML pen     insulin pen needle (32G X 4 MM) 32G X 4 MM miscellaneous     Lancet Devices (MICROLET NEXT LANCING DEVICE) MISC     lisinopril (ZESTRIL) 5 MG tablet     loperamide (IMODIUM) 2 MG capsule     magnesium oxide (MAG-OX) 400 (241.3 Mg) MG tablet     metoprolol tartrate (LOPRESSOR) 25 MG tablet     Microlet Lancets MISC     multivitamin w/minerals (MULTI-VITAMIN) tablet     multivitamin, therapeutic (THERA-VIT) TABS     MYFORTIC (BRAND) 180 MG EC tablet     omeprazole (PRILOSEC) 20 MG DR capsule     ondansetron (ZOFRAN ODT) 4 MG ODT tab     order for DME     predniSONE (DELTASONE) 5 MG tablet     rosuvastatin (CRESTOR) 40 MG tablet     sildenafil (VIAGRA) 25 MG tablet     sulfamethoxazole-trimethoprim (BACTRIM) 400-80 MG tablet     tacrolimus (GENERIC EQUIVALENT) 0.5 MG capsule     tacrolimus (GENERIC EQUIVALENT) 1 MG capsule     valGANciclovir (VALCYTE) 450 MG tablet     No current facility-administered medications for this visit.          ATTESTATION    Aubrey Duncan is a 65 year old male who is having lung transplant follow-up via a billable telephone/video visit.     I have  "reviewed the note as documented above.  This accurately captures the substance of my conversation with the patient.     Pre-clinic chart and outside record review: 15 minutes  Total telephone/video time: 20 minutes  Post-clinic charting, phone calls, messagin minutes    Please refer to today's \"Pre-visit documentation\" note for additional details about the visit.      Complexity indicators:    --immune compromised, on high-risk medications x   --organ transplant recipient x   --multiple organ transplant recipient    --active respiratory infection    --within one year of transplant; and/or within one month of hospitalization    --chronic lung allograft dysfunction syndrome (CLAD, chronic rejection, or bronchiolitis obliterans syndrome)    --new medical problem addressed during this visit    --multiple active medical problems    --admitted directly to hospital from this clinic visit    -->50% of this visit was spent in counseling and care coordination. If yes, total visit time was         Kirk Broussard MD  [facility]    "

## 2020-11-05 NOTE — LETTER
11/5/2020         RE: Aubrey Duncan  [address]  [facility] [address]           [facility]  Pulmonary Medicine/Lung Transplant  November 5, 2020         Today's visit note:       ASSESSMENT/PLAN:    # Status post bilateral lung transplant, performed in 09/2013 for alpha-1 antitrypsin deficiency emphysema. He is doing extremely well symptomatically.  CXR on 10/30/20 was unchanged from baseline, as were PFT and 6MW on 11/2/20.  His current immune suppressive medications are tacrolimus, Myfortic, and prednisone, which will be continued.     # Antimicrobial prophylaxis includes Bactrim 3 times a week and valcyte bid (recurrent CMV)    # Diabetes, under good control with Lantus and Novalog.    # Hypertension, under good control with furosemide  lisinopril and metoprolol-->continue    #  History of recurrent CMV viremia, being managed with long-term valganciclovir    #  Laceration on left calf, treated with cipro in mid-October-->patient reports that it is now healing nicely.    # History of squamous cell carcinoma on the left forearm treated with Mohs surgery in 06/2016.  He continues to follow up regularly with Dermatology.    # Healthcare maint: Rec'd flu vaccine on 10/5/20. TDAP 8/20/20. He had one dose of ShingRix but got a rash so has not had 2nd dose.    RTC 6 months in-person if COVID19 situation allows it--patient knows that I will be retiring from clinical practice soon and that he will be scheduled with another provider.     PATIENT PROFILE AND TRANSPLANT HISTORY:  Current age:                    65 year old  Underlying lung disease: Alpha - 1 - Antitrypsin Deficiency    Transplant date(s):        9/8/2013 (Lung)  Transplant POD(s):        2615  Lung transplant type(s): Bilateral Sequential Lung      Transplant coordinator:   Sanaz Lopez  Transplant provider(s):   Kirk Broussard    ALLERGIES/INTOLERANCES/SENSITIVITIES  Heparin (bovine), Heparin cross reactors, Oxycodone,  "Fluocinolone, Levaquin, and Pneumococcal vaccine    Active Patient Thresholds     Lab Low High Effective Since Comment    Tacrolimus Level 8 10 03/14/2019 [facility] 3/14/19          INTERVAL HISTORY:    --Transplant-related lab/procedure results  # Most recent resulted PRA: 10/30/20, neg for DSA  # Most recent bronchoscopy:>1 year  # Most recent transbronchial biopsy:>1 year  # Most recent blood CMV PCR: 2/19/20, neg  # Most recent blood EBV PCR: 10/30,20, neg    --Most recent lung transplant clinic visit: 4/16/20 (Aarti)    --Interval clinic visits, significant medical events (obtained by chart review and patient hx):    6/30/20: right shin laceration-->healed    8/4/20: \"lesion\" removed from right upper arm    8/20/20: Lacerated posterior aspect of left calf--> initially wasn't healing-->cipro prescribed-->now it is \"almost healed\"     --Today:    Mr. Beauchamp was evaluated today via telephone visit; also on the call was his wife, Kyung.  The patient reports that overall he is doing extremely well.  Specifically he is not short of breath with his daily activities including a lot of around the house chores.  He is not having productive cough, hemoptysis, chest pain, wheezing.    REVIEW OF SYSTEMS:  \#Appetite is good weight is stable    #The wound on his left posterior calf is \"almost healed\" and is no longer draining fluid    # Home BP are usually 130-150/80-90 in am before meds; 120/80 in the afternoon    #Complete review of systems was asked and was otherwise negative.    PHYSICAL EXAM: Patient sounded comfortable, coherent, and not short of breath.    I have reviewed and updated the patient's Past Medical History, Social History, Family History and Medication List.    Pulmonary function tests obtained at [facility] on 11/2/2020 were reviewed and interpreted by me.  Spirometry results are within normal limits with an FEV1 of 3.72 L and an FVC of 4.54 L (123 and 115% of predicted.  Lung volumes are also in " expected limits other than a residual volume of 57% predicted.  When compared with this patient's most recent previous tests there has been no significant change. 6-minute walk test was performed while the patient was breathing room air.  He walked a total of 1371 feet without stopping to rest.  SPO2 started at 97% and did not decrease significantly during the test.             Medications:     Current Outpatient Medications   Medication     albuterol (PROAIR HFA, PROVENTIL HFA, VENTOLIN HFA) 108 (90 BASE) MCG/ACT inhaler     azaTHIOprine (IMURAN) 50 MG tablet     blood glucose (NO BRAND SPECIFIED) test strip     calcium-vitamin D (CALTRATE) 600-400 MG-UNIT per tablet     ciprofloxacin (CIPRO) 500 MG tablet     ferrous sulfate (FEROSUL) 325 (65 Fe) MG tablet     fluorouracil (EFUDEX) 5 % cream     furosemide (LASIX) 20 MG tablet     insulin aspart (NOVOLOG PEN) 100 UNIT/ML pen     insulin glargine (LANTUS PEN) 100 UNIT/ML pen     insulin pen needle (32G X 4 MM) 32G X 4 MM miscellaneous     Lancet Devices (MICROLET NEXT LANCING DEVICE) MISC     lisinopril (ZESTRIL) 5 MG tablet     loperamide (IMODIUM) 2 MG capsule     magnesium oxide (MAG-OX) 400 (241.3 Mg) MG tablet     metoprolol tartrate (LOPRESSOR) 25 MG tablet     Microlet Lancets MISC     multivitamin w/minerals (MULTI-VITAMIN) tablet     multivitamin, therapeutic (THERA-VIT) TABS     MYFORTIC (BRAND) 180 MG EC tablet     omeprazole (PRILOSEC) 20 MG DR capsule     ondansetron (ZOFRAN ODT) 4 MG ODT tab     order for DME     predniSONE (DELTASONE) 5 MG tablet     rosuvastatin (CRESTOR) 40 MG tablet     sildenafil (VIAGRA) 25 MG tablet     sulfamethoxazole-trimethoprim (BACTRIM) 400-80 MG tablet     tacrolimus (GENERIC EQUIVALENT) 0.5 MG capsule     tacrolimus (GENERIC EQUIVALENT) 1 MG capsule     valGANciclovir (VALCYTE) 450 MG tablet     No current facility-administered medications for this visit.          ATTESTATION    Aubrey Duncan is a 65 year old male who  "is having lung transplant follow-up via a billable telephone/video visit.     I have reviewed the note as documented above.  This accurately captures the substance of my conversation with the patient.     Pre-clinic chart and outside record review: 15 minutes  Total telephone/video time: 20 minutes  Post-clinic charting, phone calls, messagin minutes    Please refer to today's \"Pre-visit documentation\" note for additional details about the visit.      Complexity indicators:    --immune compromised, on high-risk medications x   --organ transplant recipient x   --multiple organ transplant recipient    --active respiratory infection    --within one year of transplant; and/or within one month of hospitalization    --chronic lung allograft dysfunction syndrome (CLAD, chronic rejection, or bronchiolitis obliterans syndrome)    --new medical problem addressed during this visit    --multiple active medical problems    --admitted directly to hospital from this clinic visit    -->50% of this visit was spent in counseling and care coordination. If yes, total visit time was         Kirk Broussard MD  [facility]AC[facility]          Kirk Broussard MD  "

## 2020-11-05 NOTE — TELEPHONE ENCOUNTER
Called [facility] again to have the form faxed to us for correction again. She stated she would fax it again in the next 10 minutes.  Monae Musa LPN.......  11/5/2020  8:05 AM

## 2020-11-06 NOTE — PROGRESS NOTES
Per Dr. Broussard will schedule virtual follow up visit with Dr. Borussard in 6 months, all pre clinic testing will be completed by patient locally. [facility].  Patient also to [facility] routine labs again in 3 months.

## 2020-11-10 DIAGNOSIS — Z94.2 LUNG REPLACED BY TRANSPLANT (H): Primary | ICD-10-CM

## 2020-11-22 ENCOUNTER — HEALTH MAINTENANCE LETTER (OUTPATIENT)
Age: 65
End: 2020-11-22

## 2020-11-27 ENCOUNTER — OFFICE VISIT (OUTPATIENT)
Dept: FAMILY MEDICINE | Facility: OTHER | Age: 65
End: 2020-11-27
Attending: NURSE PRACTITIONER
Payer: MEDICARE

## 2020-11-27 VITALS
DIASTOLIC BLOOD PRESSURE: 74 MMHG | OXYGEN SATURATION: 99 % | TEMPERATURE: 97.6 F | WEIGHT: 168.6 LBS | RESPIRATION RATE: 19 BRPM | HEIGHT: 67 IN | BODY MASS INDEX: 26.46 KG/M2 | HEART RATE: 94 BPM | SYSTOLIC BLOOD PRESSURE: 148 MMHG

## 2020-11-27 DIAGNOSIS — T14.8XXA NONHEALING NONSURGICAL WOUND: Primary | ICD-10-CM

## 2020-11-27 DIAGNOSIS — B07.0 PLANTAR WART: ICD-10-CM

## 2020-11-27 PROCEDURE — 87077 CULTURE AEROBIC IDENTIFY: CPT | Mod: ZL | Performed by: NURSE PRACTITIONER

## 2020-11-27 PROCEDURE — 99214 OFFICE O/P EST MOD 30 MIN: CPT | Mod: 25 | Performed by: NURSE PRACTITIONER

## 2020-11-27 PROCEDURE — G0463 HOSPITAL OUTPT CLINIC VISIT: HCPCS | Mod: 25

## 2020-11-27 PROCEDURE — 17110 DESTRUCTION B9 LES UP TO 14: CPT | Performed by: NURSE PRACTITIONER

## 2020-11-27 PROCEDURE — 87070 CULTURE OTHR SPECIMN AEROBIC: CPT | Mod: ZL | Performed by: NURSE PRACTITIONER

## 2020-11-27 RX ORDER — CIPROFLOXACIN 500 MG/1
500 TABLET, FILM COATED ORAL 2 TIMES DAILY
Qty: 28 TABLET | Refills: 0 | Status: SHIPPED | OUTPATIENT
Start: 2020-11-27 | End: 2020-12-11

## 2020-11-27 ASSESSMENT — PAIN SCALES - GENERAL: PAINLEVEL: NO PAIN (0)

## 2020-11-27 ASSESSMENT — MIFFLIN-ST. JEOR: SCORE: 1498.39

## 2020-11-27 NOTE — PROGRESS NOTES
HPI:    Aubrey Duncan is a 65 year old male who presents to clinic today for wound check.  He continues to have some areas open on his left calf.  He has been treated with antibiotics twice for this infection.  He reports over the past couple days he has noted some increased green discharge.  Overall he feels the wound has been healing nicely up until the past couple days.  He denies any fevers or pain.  He would like to have wound culture completed and antibiotics started given has history.    He also notes that he has a wart or callus on the lateral aspect of his left foot.  This seems to be causing him discomfort when he is walking.    Past Medical History:   Diagnosis Date     Alpha-1-antitrypsin deficiency (H)      Basal cell carcinoma      CMV (cytomegalovirus infection) (H)     Reacttivation Sept 2013 when valcyte held     DVT of upper extremity (deep vein thrombosis) (H) Sept 2013    Nonocclusive thrombosis extending from the right subclavian vein to the right axillary vein,  Segmental occlusion of right basilic vein in the upper arm. 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    With DVT and thrombocytopenia     Hypertension      Lung transplant status, bilateral (H) Sept 8, 2013    Complicated by HIT and esophageal dysfunction     Sepsis associated hypotension (H) 2/24/2019     Squamous cell carcinoma      Steroid-induced diabetes mellitus (H)      Thrombocytopaenia     due to HIT       Current Outpatient Medications   Medication Sig Dispense Refill     albuterol (PROAIR HFA, PROVENTIL HFA, VENTOLIN HFA) 108 (90 BASE) MCG/ACT inhaler Inhale 2 puffs into the lungs every 6 hours as needed for shortness of breath / dyspnea or wheezing 3 Inhaler 11     azaTHIOprine (IMURAN) 50 MG tablet Take 0.5 tablets (25 mg) by mouth daily 15 tablet 11     blood glucose  (NO BRAND SPECIFIED) test strip USE AS DIRECTED FOR TESTING BLOOD GLUCOSE 3 TIMES DAILY 400 each 3     calcium-vitamin D (CALTRATE) 600-400 MG-UNIT per tablet Take 1 tablet by mouth 2 times daily (with meals) 60 tablet 12     ciprofloxacin (CIPRO) 500 MG tablet Take 1 tablet (500 mg) by mouth 2 times daily for 14 days 28 tablet 0     ciprofloxacin (CIPRO) 500 MG tablet Take 1 tablet (500 mg) by mouth 2 times daily 14 tablet 0     ferrous sulfate (FEROSUL) 325 (65 Fe) MG tablet Take 325 mg by mouth daily (with breakfast)       fluorouracil (EFUDEX) 5 % cream Apply topically daily Use once per day for 10 days on areas of scalp, forehead and face that are scaled and gritty (one month on the central forehead). Avoid eyes. 40 g 1     furosemide (LASIX) 20 MG tablet Take 1 tablet (20 mg) by mouth daily 90 tablet 4     insulin aspart (NOVOLOG PEN) 100 UNIT/ML pen Take 5 U am, 3 unit(s) non, 4 unit(s) pm of insulin within 30 minutes of start of breakfast, lunch, and dinner. Do not give if blood sugar is less than 70 mg/dl. (Patient taking differently: Take 5 U am, 3 unit(s) non, 5 unit(s) pm of insulin within 30 minutes of start of breakfast, lunch, and dinner. Do not give if blood sugar is less than 70 mg/dl.) 10 mL 11     insulin glargine (LANTUS PEN) 100 UNIT/ML pen Inject 23 Units Subcutaneous every morning (before breakfast) 30 mL 3     insulin pen needle (32G X 4 MM) 32G X 4 MM miscellaneous Use 4 pen needles daily or as directed. Dispense as insurance allows. Dx. Code: E09.9 400 each 0     Lancet Devices (MICROLET NEXT LANCING DEVICE) MISC USE AS DIRECTED 1 each 3     lisinopril (ZESTRIL) 5 MG tablet Take 1 tablet (5 mg) by mouth daily 90 tablet 4     loperamide (IMODIUM) 2 MG capsule Take 1 capsule (2 mg) by mouth 4 times daily as needed for diarrhea 120 capsule 12     magnesium oxide (MAG-OX) 400 (241.3 Mg) MG tablet Take 1 tablet (400 mg) by mouth daily 100 tablet 3     metoprolol tartrate (LOPRESSOR) 25 MG tablet  "Take 1 tablet (25 mg) by mouth 2 times daily 180 tablet 4     Microlet Lancets MISC USE AS DIRECTED FOR TESTING BLOOD GLUCOSE 4 TIMES DAILY 400 each 2     multivitamin w/minerals (MULTI-VITAMIN) tablet Take 1 tablet by mouth       multivitamin, therapeutic (THERA-VIT) TABS Take 1 tablet by mouth daily 30 tablet 12     MYFORTIC (BRAND) 180 MG EC tablet Take 180 mg by mouth 2 times daily       omeprazole (PRILOSEC) 20 MG DR capsule Take 1 capsule (20 mg) by mouth 2 times daily (before meals) 180 capsule 3     order for DME Equipment being ordered: diabetic shoes 1 each 0     predniSONE (DELTASONE) 5 MG tablet TAKE ONE TABLET BY MOUTH EVERY MORNING AND TAKE ONE-HALF TABLET BY MOUTH EVERY EVENING 45 tablet 12     rosuvastatin (CRESTOR) 40 MG tablet Take 20mg (half tablet) three times weekly 90 tablet 3     sildenafil (VIAGRA) 25 MG tablet Take 1 tablet (25 mg) by mouth as needed (as needed) 32 tablet 11     sulfamethoxazole-trimethoprim (BACTRIM) 400-80 MG tablet Take 1 tablet by mouth three times a week 13 tablet 11     tacrolimus (GENERIC EQUIVALENT) 0.5 MG capsule TAKE ONE CAPSULE BY MOUTH EVERY MORNING. TOTAL DOSE: 2.5MG IN THE MORNING & 2MG IN THE EVENING 30 capsule 11     tacrolimus (GENERIC EQUIVALENT) 1 MG capsule Take 2 capsules (2 mg) by mouth 2 times daily Total dose is 2.5mg am and 2mg pm 120 capsule 11     valGANciclovir (VALCYTE) 450 MG tablet TAKE ONE TABLETS (450MG) BY MOUTH TWO TIMES A DAY 60 tablet 11       Allergies   Allergen Reactions     Heparin (Bovine)      Other reaction(s): Thrombocytopenia, Thromboembolic Event     Heparin Cross Reactors Other (See Comments)     HIT positive and AUGUST positive      Oxycodone Other (See Comments)     Significant lethargy, confusion. Tolerates dilaudid well.      Fluocinolone Unknown and Other (See Comments)     Tendon problems  Tendon problems     Levaquin      Pneumococcal Vaccine Other (See Comments)     Other reaction(s): Fever  \"My arm swelled up like a " "balloon.\"       ROS:  Pertinent positives and negatives are noted in HPI.    EXAM:  BP (!) 148/74 (BP Location: Right arm, Patient Position: Sitting, Cuff Size: Adult Regular)   Pulse 94   Temp 97.6  F (36.4  C) (Tympanic)   Resp 19   Ht 1.702 m (5' 7\")   Wt 76.5 kg (168 lb 9.6 oz)   SpO2 99%   BMI 26.41 kg/m    General appearance: well appearing male, in no acute distress  Dermatological: Left calf with small open area, granulation tissue noted surrounding this.  Dressing he removed does have green discharge, wound itself does not appear to have any discharge on this.  Wound culture obtained.  Single wart to left foot  Psychological: normal affect, alert and pleasant    ASSESSMENT AND PLAN:    1. Nonhealing nonsurgical wound    2. Plantar wart      Wound culture obtained of nonhealing wound.  We will restart him on ciprofloxacin twice daily for 14 days to cover for Pseudomonas as he has had this twice to his wound.  Continue with daily dressing changes and will follow up with wound culture.  Close follow-up in clinic as needed.    Single plantar wart was treated with liquid nitrogen x3 passes.  Follow-up in 2 weeks, sooner if needed.      BRANDI Manning CNP..................11/27/2020 2:24 PM      This document was prepared using voice generated software.  While every attempt was made for accuracy, grammatical errors may exist.  "

## 2020-11-27 NOTE — NURSING NOTE
"Chief Complaint   Patient presents with     WOUND CARE     culture     Patient presents to clinic for a wound culture/check on left leg and he needs his left foot looked at. Says he could barely walk on it during deer season.     Initial There were no vitals taken for this visit. Estimated body mass index is 26.03 kg/m  as calculated from the following:    Height as of 10/16/20: 1.702 m (5' 7\").    Weight as of 10/16/20: 75.4 kg (166 lb 3.2 oz).    Medication Reconciliation: complete      Monae Musa  "

## 2020-11-29 LAB
BACTERIA SPEC CULT: ABNORMAL
SPECIMEN SOURCE: ABNORMAL

## 2020-12-04 ENCOUNTER — TELEPHONE (OUTPATIENT)
Dept: FAMILY MEDICINE | Facility: OTHER | Age: 65
End: 2020-12-04

## 2020-12-04 NOTE — TELEPHONE ENCOUNTER
Let patient know that his insulin arrived. He said he would be here today before 4:30 to .  Monae Musa LPN.......  12/4/2020  10:27 AM

## 2020-12-10 DIAGNOSIS — Z94.2 LUNG REPLACED BY TRANSPLANT (H): ICD-10-CM

## 2020-12-11 RX ORDER — AZATHIOPRINE 50 MG/1
25 TABLET ORAL DAILY
Qty: 15 TABLET | Refills: 11 | Status: SHIPPED | OUTPATIENT
Start: 2020-12-11 | End: 2021-12-14

## 2020-12-21 ENCOUNTER — MYC MEDICAL ADVICE (OUTPATIENT)
Dept: FAMILY MEDICINE | Facility: OTHER | Age: 65
End: 2020-12-21

## 2020-12-21 DIAGNOSIS — E11.9 DIABETES MELLITUS TYPE 2, DIET-CONTROLLED (H): ICD-10-CM

## 2020-12-22 NOTE — TELEPHONE ENCOUNTER
Please call and order any other interventions for him.  I will message him and let him know.BRANDI Manning CNP on 12/22/2020 at 7:59 AM

## 2021-01-07 ENCOUNTER — MYC MEDICAL ADVICE (OUTPATIENT)
Dept: FAMILY MEDICINE | Facility: OTHER | Age: 66
End: 2021-01-07

## 2021-01-08 ENCOUNTER — HOSPITAL ENCOUNTER (OUTPATIENT)
Dept: GENERAL RADIOLOGY | Facility: OTHER | Age: 66
End: 2021-01-08
Attending: NURSE PRACTITIONER
Payer: MEDICARE

## 2021-01-08 ENCOUNTER — OFFICE VISIT (OUTPATIENT)
Dept: FAMILY MEDICINE | Facility: OTHER | Age: 66
End: 2021-01-08
Attending: NURSE PRACTITIONER
Payer: MEDICARE

## 2021-01-08 VITALS
DIASTOLIC BLOOD PRESSURE: 82 MMHG | WEIGHT: 171.38 LBS | RESPIRATION RATE: 20 BRPM | BODY MASS INDEX: 26.84 KG/M2 | HEART RATE: 88 BPM | SYSTOLIC BLOOD PRESSURE: 140 MMHG | TEMPERATURE: 97 F | OXYGEN SATURATION: 97 %

## 2021-01-08 DIAGNOSIS — M79.672 LEFT FOOT PAIN: ICD-10-CM

## 2021-01-08 DIAGNOSIS — L84 CORN OR CALLUS: ICD-10-CM

## 2021-01-08 DIAGNOSIS — E11.9 DIABETES MELLITUS TYPE 2, DIET-CONTROLLED (H): ICD-10-CM

## 2021-01-08 DIAGNOSIS — M79.672 LEFT FOOT PAIN: Primary | ICD-10-CM

## 2021-01-08 PROCEDURE — G0463 HOSPITAL OUTPT CLINIC VISIT: HCPCS | Mod: 25

## 2021-01-08 PROCEDURE — 99213 OFFICE O/P EST LOW 20 MIN: CPT | Performed by: NURSE PRACTITIONER

## 2021-01-08 PROCEDURE — G0463 HOSPITAL OUTPT CLINIC VISIT: HCPCS

## 2021-01-08 PROCEDURE — 73630 X-RAY EXAM OF FOOT: CPT | Mod: LT

## 2021-01-08 ASSESSMENT — ANXIETY QUESTIONNAIRES
5. BEING SO RESTLESS THAT IT IS HARD TO SIT STILL: NOT AT ALL
IF YOU CHECKED OFF ANY PROBLEMS ON THIS QUESTIONNAIRE, HOW DIFFICULT HAVE THESE PROBLEMS MADE IT FOR YOU TO DO YOUR WORK, TAKE CARE OF THINGS AT HOME, OR GET ALONG WITH OTHER PEOPLE: NOT DIFFICULT AT ALL
2. NOT BEING ABLE TO STOP OR CONTROL WORRYING: NOT AT ALL
7. FEELING AFRAID AS IF SOMETHING AWFUL MIGHT HAPPEN: NOT AT ALL
GAD7 TOTAL SCORE: 0
1. FEELING NERVOUS, ANXIOUS, OR ON EDGE: NOT AT ALL
6. BECOMING EASILY ANNOYED OR IRRITABLE: NOT AT ALL
3. WORRYING TOO MUCH ABOUT DIFFERENT THINGS: NOT AT ALL

## 2021-01-08 ASSESSMENT — PAIN SCALES - GENERAL: PAINLEVEL: MILD PAIN (2)

## 2021-01-08 ASSESSMENT — PATIENT HEALTH QUESTIONNAIRE - PHQ9
SUM OF ALL RESPONSES TO PHQ QUESTIONS 1-9: 0
5. POOR APPETITE OR OVEREATING: NOT AT ALL

## 2021-01-08 NOTE — NURSING NOTE
"Patient presents to the clinic for ongoing skin concerns of the left foot.      Previous A1C is at goal of <8  Lab Results   Component Value Date    A1C 5.1 10/30/2020    A1C 5.4 06/30/2020    A1C 5.4 12/12/2019    A1C 5.3 09/10/2019    A1C 5.7 06/04/2019     Urine microalbumin:creatine: n/a  Foot exam 12-, left foot examined today  Eye exam 2019    Tobacco User no  Patient is not on a daily aspirin  Patient is on a Statin.  Blood pressure today of:     BP Readings from Last 1 Encounters:   11/27/20 (!) 148/74      is not at the goal of <139/89 for diabetics.      Chief Complaint   Patient presents with     Musculoskeletal Problem       Initial BP (!) 140/82 (BP Location: Right arm, Patient Position: Sitting, Cuff Size: Adult Regular)   Pulse 88   Temp 97  F (36.1  C) (Temporal)   Resp 20   Wt 77.7 kg (171 lb 6 oz)   SpO2 97%   BMI 26.84 kg/m   Estimated body mass index is 26.84 kg/m  as calculated from the following:    Height as of 11/27/20: 1.702 m (5' 7\").    Weight as of this encounter: 77.7 kg (171 lb 6 oz).  Medication Reconciliation: complete    Vanda Yates LPN            "

## 2021-01-08 NOTE — PROGRESS NOTES
HPI:    Aubrey Duncan is a 65 year old male who presents to clinic today for evaluation of left foot pain.  He has had pain to the lateral aspect of his left foot, distal fifth metatarsal area for the past 3 to 4 months.  He does have a callus to this area.  We did try liquid nitrogen in the past to see if this would help resolve the symptoms.  He reports that this did not improve his symptoms.  He has been using foot soaks and filing this area to keep it down.  He did take a shoe insole and cut a hole in it so he did not have pressure to this area which was helpful.  He is very worried about something wrong with his bones and would like to have an x-ray today.    Past Medical History:   Diagnosis Date     Alpha-1-antitrypsin deficiency (H)      Basal cell carcinoma      CMV (cytomegalovirus infection) (H)     Reacttivation Sept 2013 when valcyte held     DVT of upper extremity (deep vein thrombosis) (H) Sept 2013    Nonocclusive thrombosis extending from the right subclavian vein to the right axillary vein,  Segmental occlusion of right basilic vein in the upper arm. 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    With DVT and thrombocytopenia     Hypertension      Lung transplant status, bilateral (H) Sept 8, 2013    Complicated by HIT and esophageal dysfunction     Sepsis associated hypotension (H) 2/24/2019     Squamous cell carcinoma      Steroid-induced diabetes mellitus (H)      Thrombocytopaenia     due to HIT         Current Outpatient Medications   Medication Sig Dispense Refill     albuterol (PROAIR HFA, PROVENTIL HFA, VENTOLIN HFA) 108 (90 BASE) MCG/ACT inhaler Inhale 2 puffs into the lungs every 6 hours as needed for shortness of breath / dyspnea or wheezing 3 Inhaler 11     azaTHIOprine (IMURAN) 50 MG tablet Take 0.5 tablets (25 mg) by mouth daily 15  tablet 11     blood glucose (NO BRAND SPECIFIED) test strip USE AS DIRECTED FOR TESTING BLOOD GLUCOSE 3 TIMES DAILY 400 each 3     calcium-vitamin D (CALTRATE) 600-400 MG-UNIT per tablet Take 1 tablet by mouth 2 times daily (with meals) 60 tablet 12     ferrous sulfate (FEROSUL) 325 (65 Fe) MG tablet Take 325 mg by mouth daily (with breakfast)       fluorouracil (EFUDEX) 5 % cream Apply topically daily Use once per day for 10 days on areas of scalp, forehead and face that are scaled and gritty (one month on the central forehead). Avoid eyes. 40 g 1     furosemide (LASIX) 20 MG tablet Take 1 tablet (20 mg) by mouth daily 90 tablet 4     insulin aspart (NOVOLOG PEN) 100 UNIT/ML pen Take 5 U am, 3 unit(s) non, 4 unit(s) pm of insulin within 30 minutes of start of breakfast, lunch, and dinner. Do not give if blood sugar is less than 70 mg/dl. (Patient taking differently: Take 5 U am, 3 unit(s) non, 5 unit(s) pm of insulin within 30 minutes of start of breakfast, lunch, and dinner. Do not give if blood sugar is less than 70 mg/dl.) 10 mL 11     insulin glargine (LANTUS PEN) 100 UNIT/ML pen Inject 23 Units Subcutaneous every morning (before breakfast) 30 mL 3     insulin pen needle (32G X 4 MM) 32G X 4 MM miscellaneous Use 4 pen needles daily or as directed. Dispense as insurance allows. Dx. Code: E09.9 400 each 0     Lancet Devices (MICROLET NEXT LANCING DEVICE) MISC USE AS DIRECTED 1 each 3     lisinopril (ZESTRIL) 5 MG tablet Take 1 tablet (5 mg) by mouth daily 90 tablet 4     loperamide (IMODIUM) 2 MG capsule Take 1 capsule (2 mg) by mouth 4 times daily as needed for diarrhea 120 capsule 12     magnesium oxide (MAG-OX) 400 (241.3 Mg) MG tablet Take 1 tablet (400 mg) by mouth daily 100 tablet 3     metoprolol tartrate (LOPRESSOR) 25 MG tablet Take 1 tablet (25 mg) by mouth 2 times daily 180 tablet 4     Microlet Lancets MISC USE AS DIRECTED FOR TESTING BLOOD GLUCOSE 4 TIMES DAILY 400 each 2     multivitamin,  "therapeutic (THERA-VIT) TABS Take 1 tablet by mouth daily 30 tablet 12     MYFORTIC (BRAND) 180 MG EC tablet Take 180 mg by mouth 2 times daily       omeprazole (PRILOSEC) 20 MG DR capsule Take 1 capsule (20 mg) by mouth 2 times daily (before meals) 180 capsule 3     order for DME Equipment being ordered: diabetic shoes 1 each 0     predniSONE (DELTASONE) 5 MG tablet TAKE ONE TABLET BY MOUTH EVERY MORNING AND TAKE ONE-HALF TABLET BY MOUTH EVERY EVENING 45 tablet 12     rosuvastatin (CRESTOR) 40 MG tablet Take 20mg (half tablet) three times weekly 90 tablet 3     sildenafil (VIAGRA) 25 MG tablet Take 1 tablet (25 mg) by mouth as needed (as needed) 32 tablet 11     sulfamethoxazole-trimethoprim (BACTRIM) 400-80 MG tablet Take 1 tablet by mouth three times a week 13 tablet 11     tacrolimus (GENERIC EQUIVALENT) 0.5 MG capsule TAKE ONE CAPSULE BY MOUTH EVERY MORNING. TOTAL DOSE: 2.5MG IN THE MORNING & 2MG IN THE EVENING 30 capsule 11     tacrolimus (GENERIC EQUIVALENT) 1 MG capsule Take 2 capsules (2 mg) by mouth 2 times daily Total dose is 2.5mg am and 2mg pm 120 capsule 11     valGANciclovir (VALCYTE) 450 MG tablet TAKE ONE TABLETS (450MG) BY MOUTH TWO TIMES A DAY 60 tablet 11       Allergies   Allergen Reactions     Heparin (Bovine)      Other reaction(s): Thrombocytopenia, Thromboembolic Event     Heparin Cross Reactors Other (See Comments)     HIT positive and AUGUST positive      Oxycodone Other (See Comments)     Significant lethargy, confusion. Tolerates dilaudid well.      Fluocinolone Other (See Comments)     Tendon problems       Levaquin      Pneumococcal Vaccine Other (See Comments)     Other reaction(s): Fever  \"My arm swelled up like a balloon.\"       ROS:  Pertinent positives and negatives are noted in HPI.    EXAM:  BP (!) 140/82 (BP Location: Right arm, Patient Position: Sitting, Cuff Size: Adult Regular)   Pulse 88   Temp 97  F (36.1  C) (Temporal)   Resp 20   Wt 77.7 kg (171 lb 6 oz)   SpO2 97%   " BMI 26.84 kg/m    General appearance: well appearing male, in no acute distress  Musculoskeletal: Normal range of motion of left forefoot and toes.  No tenderness with palpation to bony structure.  Dermatological: Thickened skin and corn appreciated to sole of foot at distal fifth metatarsal.  He is tender when I squeeze this area.  Psychological: normal affect, alert and pleasant    Xray: xray independently reviewed and no acute bony abnormality appreciated; pending radiology over-read    ASSESSMENT AND PLAN:    1. Left foot pain    2. Corn or callus    3. Diabetes mellitus type 2, diet-controlled (H)        Patient has steroid induced diabetes, status post organ transplant.  He has a corn/callus to the sole of his left foot.  X-rays are stable with no bony abnormalities appreciated.  Referred to podiatry for further evaluation and management at this time.    BRANDI Manning CNP..................1/8/2021 1:42 PM      This document was prepared using voice generated software.  While every attempt was made for accuracy, grammatical errors may exist.

## 2021-01-09 ASSESSMENT — ANXIETY QUESTIONNAIRES: GAD7 TOTAL SCORE: 0

## 2021-03-10 ENCOUNTER — MYC REFILL (OUTPATIENT)
Dept: FAMILY MEDICINE | Facility: OTHER | Age: 66
End: 2021-03-10

## 2021-03-10 DIAGNOSIS — E09.9 STEROID-INDUCED DIABETES MELLITUS (H): ICD-10-CM

## 2021-03-10 DIAGNOSIS — E11.9 DIABETES MELLITUS TYPE 2, DIET-CONTROLLED (H): Primary | ICD-10-CM

## 2021-03-10 DIAGNOSIS — T38.0X5A STEROID-INDUCED DIABETES MELLITUS (H): ICD-10-CM

## 2021-03-15 ENCOUNTER — MYC MEDICAL ADVICE (OUTPATIENT)
Dept: FAMILY MEDICINE | Facility: OTHER | Age: 66
End: 2021-03-15

## 2021-03-16 ENCOUNTER — TRANSFERRED RECORDS (OUTPATIENT)
Dept: HEALTH INFORMATION MANAGEMENT | Facility: OTHER | Age: 66
End: 2021-03-16

## 2021-03-16 LAB — RETINOPATHY: NEGATIVE

## 2021-03-17 NOTE — RESULT ENCOUNTER NOTE
Most recent A1C faxed to [facility] as requested.

## 2021-04-04 ENCOUNTER — HEALTH MAINTENANCE LETTER (OUTPATIENT)
Age: 66
End: 2021-04-04

## 2021-04-06 DIAGNOSIS — Z94.2 LUNG TRANSPLANT STATUS, BILATERAL (H): ICD-10-CM

## 2021-04-12 ENCOUNTER — TELEPHONE (OUTPATIENT)
Dept: FAMILY MEDICINE | Facility: OTHER | Age: 66
End: 2021-04-12

## 2021-04-12 DIAGNOSIS — Z94.2 LUNG TRANSPLANT STATUS, BILATERAL (H): ICD-10-CM

## 2021-04-12 NOTE — TELEPHONE ENCOUNTER
[facility] would like to know if [facility] received the request for patients refill on omeprazole.    Chelita Almeida on 4/12/2021 at 8:17 AM

## 2021-04-15 ENCOUNTER — TELEPHONE (OUTPATIENT)
Dept: FAMILY MEDICINE | Facility: OTHER | Age: 66
End: 2021-04-15

## 2021-04-15 NOTE — TELEPHONE ENCOUNTER
Let patient know that forms are ready for pick-up. Will also scan into chart.  Monae Musa LPN.......  4/15/2021  3:24 PM

## 2021-05-04 ENCOUNTER — TELEPHONE (OUTPATIENT)
Dept: TRANSPLANT | Facility: CLINIC | Age: 66
End: 2021-05-04

## 2021-05-04 ENCOUNTER — TELEPHONE (OUTPATIENT)
Dept: FAMILY MEDICINE | Facility: OTHER | Age: 66
End: 2021-05-04

## 2021-05-04 NOTE — LETTER
PHYSICIAN ORDERS      DATE & TIME ISSUED: May 4, 2021 11:20 AM  PATIENT NAME: Aubrey Duncan   : 1955     [facility] MR# [if applicable]: [id number]     DIAGNOSIS:  Long Term use of medications  Z79.899 and Lung Transplant  Z94.2  Complete the following in May of 2021:  2 view chest x-ray  Pulmonary function test:  Spirometry - volume loop only  Labs:  Basic metabolic panel, Magnesium level, CBC with platelets, Liver panel, CMV DNA quantification,Tacrolimus 12 hour trough level.    Any questions please call:  [phone]  Please fax these results to [phone].      .

## 2021-05-04 NOTE — TELEPHONE ENCOUNTER
Faxed lab orders and PFT with CXR orders to [facility] for completion prior to audio visit with Aarti Carmona on 5-12-21

## 2021-05-04 NOTE — TELEPHONE ENCOUNTER
They rec'd a form for help with novalog and need additional info please call aware [place] is back tomorrow

## 2021-05-05 ENCOUNTER — TELEPHONE (OUTPATIENT)
Dept: TRANSPLANT | Facility: CLINIC | Age: 66
End: 2021-05-05

## 2021-05-05 NOTE — TELEPHONE ENCOUNTER
Has upcoming virtual appointnent with Dr Broussard  He is unable to get all his tests done locally before the appt

## 2021-05-06 NOTE — TELEPHONE ENCOUNTER
Let patient know that we did receive form, and will get it filled out and sent back in. Also let him know that he had Lantus here to pick-up. He stated he would come in today to pick it up.   Monae Musa LPN.......  5/6/2021  9:13 AM

## 2021-05-08 ENCOUNTER — ALLIED HEALTH/NURSE VISIT (OUTPATIENT)
Dept: FAMILY MEDICINE | Facility: OTHER | Age: 66
End: 2021-05-08
Attending: FAMILY MEDICINE
Payer: MEDICARE

## 2021-05-08 DIAGNOSIS — Z20.822 COVID-19 RULED OUT: Primary | ICD-10-CM

## 2021-05-08 LAB
LABORATORY COMMENT REPORT: NORMAL
SARS-COV-2 RNA RESP QL NAA+PROBE: NEGATIVE
SARS-COV-2 RNA RESP QL NAA+PROBE: NORMAL
SPECIMEN SOURCE: NORMAL
SPECIMEN SOURCE: NORMAL

## 2021-05-08 PROCEDURE — U0005 INFEC AGEN DETEC AMPLI PROBE: HCPCS | Mod: ZL | Performed by: FAMILY MEDICINE

## 2021-05-08 PROCEDURE — U0003 INFECTIOUS AGENT DETECTION BY NUCLEIC ACID (DNA OR RNA); SEVERE ACUTE RESPIRATORY SYNDROME CORONAVIRUS 2 (SARS-COV-2) (CORONAVIRUS DISEASE [COVID-19]), AMPLIFIED PROBE TECHNIQUE, MAKING USE OF HIGH THROUGHPUT TECHNOLOGIES AS DESCRIBED BY CMS-2020-01-R: HCPCS | Mod: ZL | Performed by: FAMILY MEDICINE

## 2021-05-08 PROCEDURE — C9803 HOPD COVID-19 SPEC COLLECT: HCPCS

## 2021-05-11 ENCOUNTER — TELEPHONE (OUTPATIENT)
Dept: FAMILY MEDICINE | Facility: OTHER | Age: 66
End: 2021-05-11
Payer: MEDICARE

## 2021-05-11 NOTE — TELEPHONE ENCOUNTER
Received all the documentation that they needed, patient'st enrollment has been approved until Nov 30th, 2021, his supplies will be delivered to him in 10-14 business days.  Thanks!    Veena Palumbo on 5/11/2021 at 2:17 PM

## 2021-05-12 ENCOUNTER — HOSPITAL ENCOUNTER (OUTPATIENT)
Dept: RESPIRATORY THERAPY | Facility: OTHER | Age: 66
End: 2021-05-12
Attending: FAMILY MEDICINE
Payer: MEDICARE

## 2021-05-12 ENCOUNTER — HOSPITAL ENCOUNTER (OUTPATIENT)
Dept: GENERAL RADIOLOGY | Facility: OTHER | Age: 66
End: 2021-05-12
Attending: INTERNAL MEDICINE
Payer: MEDICARE

## 2021-05-12 DIAGNOSIS — Z79.899 ENCOUNTER FOR LONG-TERM (CURRENT) USE OF MEDICATIONS: ICD-10-CM

## 2021-05-12 DIAGNOSIS — Z94.2 LUNG TRANSPLANTED (H): ICD-10-CM

## 2021-05-12 DIAGNOSIS — Z94.2 TRANSPLANTED, LUNG (H): Primary | ICD-10-CM

## 2021-05-12 DIAGNOSIS — Z94.2 LUNG REPLACED BY TRANSPLANT (H): ICD-10-CM

## 2021-05-12 LAB
ALBUMIN SERPL-MCNC: 3.8 G/DL (ref 3.5–5.7)
ALP SERPL-CCNC: 38 U/L (ref 34–104)
ALT SERPL W P-5'-P-CCNC: 55 U/L (ref 7–52)
ANION GAP SERPL CALCULATED.3IONS-SCNC: 9 MMOL/L (ref 3–14)
AST SERPL W P-5'-P-CCNC: 43 U/L (ref 13–39)
BILIRUB DIRECT SERPL-MCNC: 0.1 MG/DL (ref 0–0.2)
BILIRUB SERPL-MCNC: 0.3 MG/DL (ref 0.3–1)
BUN SERPL-MCNC: 41 MG/DL (ref 7–25)
CALCIUM SERPL-MCNC: 9.2 MG/DL (ref 8.6–10.3)
CHLORIDE SERPL-SCNC: 108 MMOL/L (ref 98–107)
CO2 SERPL-SCNC: 24 MMOL/L (ref 21–31)
CREAT SERPL-MCNC: 1.22 MG/DL (ref 0.7–1.3)
ERYTHROCYTE [DISTWIDTH] IN BLOOD BY AUTOMATED COUNT: 13.1 % (ref 10–15)
GFR SERPL CREATININE-BSD FRML MDRD: 59 ML/MIN/{1.73_M2}
GLUCOSE SERPL-MCNC: 79 MG/DL (ref 70–105)
HCT VFR BLD AUTO: 38.3 % (ref 40–53)
HGB BLD-MCNC: 12.7 G/DL (ref 13.3–17.7)
MAGNESIUM SERPL-MCNC: 1.5 MG/DL (ref 1.9–2.7)
MCH RBC QN AUTO: 34 PG (ref 26.5–33)
MCHC RBC AUTO-ENTMCNC: 33.2 G/DL (ref 31.5–36.5)
MCV RBC AUTO: 102 FL (ref 78–100)
PLATELET # BLD AUTO: 184 10E9/L (ref 150–450)
POTASSIUM SERPL-SCNC: 4.6 MMOL/L (ref 3.5–5.1)
PROT SERPL-MCNC: 6.2 G/DL (ref 6.4–8.9)
RBC # BLD AUTO: 3.74 10E12/L (ref 4.4–5.9)
SODIUM SERPL-SCNC: 141 MMOL/L (ref 134–144)
WBC # BLD AUTO: 6.6 10E9/L (ref 4–11)

## 2021-05-12 PROCEDURE — 80076 HEPATIC FUNCTION PANEL: CPT | Mod: ZL | Performed by: INTERNAL MEDICINE

## 2021-05-12 PROCEDURE — 85027 COMPLETE CBC AUTOMATED: CPT | Mod: ZL | Performed by: INTERNAL MEDICINE

## 2021-05-12 PROCEDURE — 83735 ASSAY OF MAGNESIUM: CPT | Mod: ZL | Performed by: INTERNAL MEDICINE

## 2021-05-12 PROCEDURE — 94010 BREATHING CAPACITY TEST: CPT | Mod: 26 | Performed by: INTERNAL MEDICINE

## 2021-05-12 PROCEDURE — 80048 BASIC METABOLIC PNL TOTAL CA: CPT | Mod: ZL | Performed by: INTERNAL MEDICINE

## 2021-05-12 PROCEDURE — 94010 BREATHING CAPACITY TEST: CPT

## 2021-05-12 PROCEDURE — 80197 ASSAY OF TACROLIMUS: CPT | Mod: ZL | Performed by: INTERNAL MEDICINE

## 2021-05-12 PROCEDURE — 36415 COLL VENOUS BLD VENIPUNCTURE: CPT | Mod: ZL | Performed by: INTERNAL MEDICINE

## 2021-05-12 PROCEDURE — 71046 X-RAY EXAM CHEST 2 VIEWS: CPT

## 2021-05-14 ENCOUNTER — TELEPHONE (OUTPATIENT)
Dept: FAMILY MEDICINE | Facility: OTHER | Age: 66
End: 2021-05-14

## 2021-05-14 ENCOUNTER — TELEPHONE (OUTPATIENT)
Dept: TRANSPLANT | Facility: CLINIC | Age: 66
End: 2021-05-14

## 2021-05-14 LAB
CMV DNA SPEC NAA+PROBE-ACNC: NORMAL [IU]/ML
CMV DNA SPEC NAA+PROBE-LOG#: NORMAL {LOG_IU}/ML
SPECIMEN SOURCE: NORMAL
TACROLIMUS BLD-MCNC: 7.8 UG/L (ref 5–15)
TME LAST DOSE: 2230 H

## 2021-05-14 NOTE — TELEPHONE ENCOUNTER
Most recently patient stated he takes NovoLog 5 units in the morning, 3 units at noon and 5 units in the evening within 30 minutes of breakfast lunch and dinner. Please call and let the pharmacy know this is the current order.    BRANDI Manning CNP on 5/14/2021 at 1:30 PM

## 2021-05-14 NOTE — LETTER
PHYSICIAN ORDERS      DATE & TIME ISSUED: May 14, 2021 12:51 PM  PATIENT NAME: Aubrey Duncan   : 1955     [facility] MR# [if applicable]: [id number]     DIAGNOSIS:  Long Term use of medications  Z79.899 and Lung Transplant  Z94.2    Please draw tacrolimus level (note time of last dose) week of 21.    Any questions please call: Luz [phone]    Please fax these results to [phone].      .

## 2021-05-14 NOTE — TELEPHONE ENCOUNTER
Tacrolimus level 7.8 at 15 hours, on 5/12/21.  Goal 8-10.   Current dose 2.5 mg in AM, 2.0 mg in PM    Not accurate 12 hour level.  No dose change at this time.  Recheck level next week.    Discussed with pt.

## 2021-05-21 ENCOUNTER — TELEPHONE (OUTPATIENT)
Dept: TRANSPLANT | Facility: CLINIC | Age: 66
End: 2021-05-21

## 2021-05-21 DIAGNOSIS — E11.9 DIABETES MELLITUS TYPE 2, DIET-CONTROLLED (H): ICD-10-CM

## 2021-05-21 DIAGNOSIS — Z94.2 LUNG REPLACED BY TRANSPLANT (H): Primary | ICD-10-CM

## 2021-05-21 DIAGNOSIS — Z79.899 ENCOUNTER FOR LONG-TERM (CURRENT) USE OF MEDICATIONS: ICD-10-CM

## 2021-05-21 DIAGNOSIS — Z94.2 LUNG TRANSPLANTED (H): ICD-10-CM

## 2021-05-21 DIAGNOSIS — Z79.52 LONG TERM (CURRENT) USE OF SYSTEMIC STEROIDS: ICD-10-CM

## 2021-05-21 DIAGNOSIS — Z79.899 OTHER LONG TERM (CURRENT) DRUG THERAPY: ICD-10-CM

## 2021-05-21 PROCEDURE — 80197 ASSAY OF TACROLIMUS: CPT | Mod: ZL | Performed by: INTERNAL MEDICINE

## 2021-05-21 PROCEDURE — 36415 COLL VENOUS BLD VENIPUNCTURE: CPT | Mod: ZL | Performed by: INTERNAL MEDICINE

## 2021-05-21 NOTE — TELEPHONE ENCOUNTER
Spoke with the pt regarding follow up from clinic visit yesterday.  Plan for pt to have spirometry and labs every 3 months, with follow up in person clinic visit with Dr. Murphy in 6 months.  Orders sent to [facility] per pt preference.

## 2021-05-22 LAB
TACROLIMUS BLD-MCNC: 8.1 UG/L (ref 5–15)
TME LAST DOSE: 2010 H

## 2021-05-24 NOTE — RESULT ENCOUNTER NOTE
Tacrolimus level 8.1 at 12 hours, on 5/21/21.  Goal 8-10.   Current dose 2.5 mg in AM, 2 mg in PM    Level at goal.  No dose change.     [employer] message sent

## 2021-05-25 ENCOUNTER — TELEPHONE (OUTPATIENT)
Dept: TRANSPLANT | Facility: CLINIC | Age: 66
End: 2021-05-25

## 2021-05-25 NOTE — TELEPHONE ENCOUNTER
Spoke with the patient and confirmed post lung appointments on 11/19/21.  Informed patient an itinerary can be accessed on [email or web address].

## 2021-05-29 ENCOUNTER — HEALTH MAINTENANCE LETTER (OUTPATIENT)
Age: 66
End: 2021-05-29

## 2021-06-03 ENCOUNTER — RESULTS ONLY (OUTPATIENT)
Dept: OTHER | Facility: CLINIC | Age: 66
End: 2021-06-03

## 2021-06-03 DIAGNOSIS — Z94.2 LUNG REPLACED BY TRANSPLANT (H): ICD-10-CM

## 2021-06-03 LAB
ANION GAP SERPL CALCULATED.3IONS-SCNC: 9 MMOL/L (ref 3–14)
BUN SERPL-MCNC: 38 MG/DL (ref 7–25)
CALCIUM SERPL-MCNC: 8.9 MG/DL (ref 8.6–10.3)
CHLORIDE SERPL-SCNC: 105 MMOL/L (ref 98–107)
CO2 SERPL-SCNC: 26 MMOL/L (ref 21–31)
CREAT SERPL-MCNC: 1.29 MG/DL (ref 0.7–1.3)
ERYTHROCYTE [DISTWIDTH] IN BLOOD BY AUTOMATED COUNT: 13.1 % (ref 10–15)
GFR SERPL CREATININE-BSD FRML MDRD: 56 ML/MIN/{1.73_M2}
GLUCOSE SERPL-MCNC: 96 MG/DL (ref 70–105)
HCT VFR BLD AUTO: 40.1 % (ref 40–53)
HGB BLD-MCNC: 13 G/DL (ref 13.3–17.7)
MAGNESIUM SERPL-MCNC: 1.8 MG/DL (ref 1.9–2.7)
MCH RBC QN AUTO: 33.9 PG (ref 26.5–33)
MCHC RBC AUTO-ENTMCNC: 32.4 G/DL (ref 31.5–36.5)
MCV RBC AUTO: 105 FL (ref 78–100)
PLATELET # BLD AUTO: 167 10E9/L (ref 150–450)
POTASSIUM SERPL-SCNC: 4.6 MMOL/L (ref 3.5–5.1)
RBC # BLD AUTO: 3.83 10E12/L (ref 4.4–5.9)
SODIUM SERPL-SCNC: 140 MMOL/L (ref 134–144)
WBC # BLD AUTO: 6.2 10E9/L (ref 4–11)

## 2021-06-03 PROCEDURE — 86833 HLA CLASS II HIGH DEFIN QUAL: CPT | Mod: ZL | Performed by: INTERNAL MEDICINE

## 2021-06-03 PROCEDURE — 85027 COMPLETE CBC AUTOMATED: CPT | Mod: ZL | Performed by: INTERNAL MEDICINE

## 2021-06-03 PROCEDURE — 83735 ASSAY OF MAGNESIUM: CPT | Mod: ZL | Performed by: INTERNAL MEDICINE

## 2021-06-03 PROCEDURE — 86832 HLA CLASS I HIGH DEFIN QUAL: CPT | Mod: ZL | Performed by: INTERNAL MEDICINE

## 2021-06-03 PROCEDURE — 87799 DETECT AGENT NOS DNA QUANT: CPT | Mod: ZL | Performed by: INTERNAL MEDICINE

## 2021-06-03 PROCEDURE — 80048 BASIC METABOLIC PNL TOTAL CA: CPT | Mod: ZL | Performed by: INTERNAL MEDICINE

## 2021-06-03 PROCEDURE — 80197 ASSAY OF TACROLIMUS: CPT | Mod: ZL | Performed by: INTERNAL MEDICINE

## 2021-06-03 PROCEDURE — 36415 COLL VENOUS BLD VENIPUNCTURE: CPT | Mod: ZL | Performed by: INTERNAL MEDICINE

## 2021-06-04 LAB
CMV DNA SPEC NAA+PROBE-ACNC: NORMAL [IU]/ML
CMV DNA SPEC NAA+PROBE-LOG#: NORMAL {LOG_IU}/ML
DONOR IDENTIFICATION: NORMAL
DSA COMMENTS: NORMAL
DSA PRESENT: NO
DSA TEST METHOD: NORMAL
EBV DNA # SPEC NAA+PROBE: 704 {COPIES}/ML
EBV DNA SPEC NAA+PROBE-LOG#: 2.8 {LOG_COPIES}/ML
ORGAN: NORMAL
SA1 CELL: NORMAL
SA1 COMMENTS: NORMAL
SA1 HI RISK ABY: NORMAL
SA1 MOD RISK ABY: NORMAL
SA1 TEST METHOD: NORMAL
SA2 CELL: NORMAL
SA2 COMMENTS: NORMAL
SA2 HI RISK ABY UA: NORMAL
SA2 MOD RISK ABY: NORMAL
SA2 TEST METHOD: NORMAL
SPECIMEN SOURCE: NORMAL
TACROLIMUS BLD-MCNC: 9.4 UG/L (ref 5–15)
TME LAST DOSE: NORMAL H
UNACCEPTABLE ANTIGEN: NORMAL
UNOS CPRA: 0

## 2021-06-04 NOTE — RESULT ENCOUNTER NOTE
Tacrolimus level 9.4 at 12 hours, on 6/3/21.  Goal 8-10.   Current dose 2.5 mg in AM, 2.0 mg in PM    Level at goal.  No dose change.     "[employer] message sent

## 2021-06-21 ENCOUNTER — OFFICE VISIT (OUTPATIENT)
Dept: FAMILY MEDICINE | Facility: OTHER | Age: 66
End: 2021-06-21
Attending: NURSE PRACTITIONER
Payer: MEDICARE

## 2021-06-21 VITALS
DIASTOLIC BLOOD PRESSURE: 80 MMHG | SYSTOLIC BLOOD PRESSURE: 136 MMHG | BODY MASS INDEX: 27.28 KG/M2 | HEART RATE: 56 BPM | RESPIRATION RATE: 20 BRPM | WEIGHT: 173.8 LBS | HEIGHT: 67 IN | TEMPERATURE: 95.7 F | OXYGEN SATURATION: 100 %

## 2021-06-21 DIAGNOSIS — I10 ESSENTIAL HYPERTENSION: ICD-10-CM

## 2021-06-21 DIAGNOSIS — E88.01 ALPHA-1-ANTITRYPSIN DEFICIENCY (H): ICD-10-CM

## 2021-06-21 DIAGNOSIS — Z00.00 MEDICARE ANNUAL WELLNESS VISIT, SUBSEQUENT: Primary | ICD-10-CM

## 2021-06-21 DIAGNOSIS — I82.629 ACUTE VENOUS EMBOLISM AND THROMBOSIS OF DEEP VEINS OF UPPER EXTREMITY, UNSPECIFIED LATERALITY (H): ICD-10-CM

## 2021-06-21 DIAGNOSIS — E11.9 DIABETES MELLITUS TYPE 2, DIET-CONTROLLED (H): ICD-10-CM

## 2021-06-21 DIAGNOSIS — J44.9 CHRONIC OBSTRUCTIVE PULMONARY DISEASE, UNSPECIFIED COPD TYPE (H): ICD-10-CM

## 2021-06-21 DIAGNOSIS — E11.42 TYPE 2 DIABETES MELLITUS WITH DIABETIC POLYNEUROPATHY, WITH LONG-TERM CURRENT USE OF INSULIN (H): ICD-10-CM

## 2021-06-21 DIAGNOSIS — Z79.4 TYPE 2 DIABETES MELLITUS WITH DIABETIC POLYNEUROPATHY, WITH LONG-TERM CURRENT USE OF INSULIN (H): ICD-10-CM

## 2021-06-21 PROBLEM — N20.1 URETERAL STONE: Status: RESOLVED | Noted: 2017-10-17 | Resolved: 2021-06-21

## 2021-06-21 LAB
CREAT UR-MCNC: 82 MG/DL
HBA1C MFR BLD: 5.2 % (ref 4–6)
MICROALBUMIN UR-MCNC: 6 MG/L
MICROALBUMIN/CREAT UR: 6.93 MG/G CR (ref 0–17)

## 2021-06-21 PROCEDURE — G0438 PPPS, INITIAL VISIT: HCPCS | Performed by: NURSE PRACTITIONER

## 2021-06-21 PROCEDURE — 36415 COLL VENOUS BLD VENIPUNCTURE: CPT | Mod: ZL | Performed by: NURSE PRACTITIONER

## 2021-06-21 PROCEDURE — 83036 HEMOGLOBIN GLYCOSYLATED A1C: CPT | Mod: ZL | Performed by: NURSE PRACTITIONER

## 2021-06-21 PROCEDURE — 82043 UR ALBUMIN QUANTITATIVE: CPT | Mod: ZL | Performed by: NURSE PRACTITIONER

## 2021-06-21 RX ORDER — LISINOPRIL 5 MG/1
5 TABLET ORAL DAILY
Qty: 90 TABLET | Refills: 4 | Status: SHIPPED | OUTPATIENT
Start: 2021-06-21 | End: 2022-06-02

## 2021-06-21 RX ORDER — FUROSEMIDE 20 MG
20 TABLET ORAL DAILY
Qty: 90 TABLET | Refills: 4 | Status: SHIPPED | OUTPATIENT
Start: 2021-06-21 | End: 2022-07-12

## 2021-06-21 RX ORDER — METOPROLOL TARTRATE 25 MG/1
25 TABLET, FILM COATED ORAL 2 TIMES DAILY
Qty: 180 TABLET | Refills: 4 | Status: SHIPPED | OUTPATIENT
Start: 2021-06-21 | End: 2021-08-23

## 2021-06-21 ASSESSMENT — PAIN SCALES - GENERAL: PAINLEVEL: NO PAIN (0)

## 2021-06-21 ASSESSMENT — MIFFLIN-ST. JEOR: SCORE: 1521.98
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
## 2021-06-21 NOTE — NURSING NOTE
"Chief Complaint   Patient presents with     Physical     Patient presents to clinic for medicare wellness. He states he has no concerns at this time.    Initial /80 (BP Location: Right arm, Patient Position: Sitting, Cuff Size: Adult Regular)   Pulse 56   Temp 95.7  F (35.4  C) (Temporal)   Resp 20   Ht 1.702 m (5' 7\")   Wt 78.8 kg (173 lb 12.8 oz)   SpO2 100%   BMI 27.22 kg/m   Estimated body mass index is 27.22 kg/m  as calculated from the following:    Height as of this encounter: 1.702 m (5' 7\").    Weight as of this encounter: 78.8 kg (173 lb 12.8 oz).         Medication Reconciliation: Complete      Monae Musa   "

## 2021-07-04 ENCOUNTER — MYC MEDICAL ADVICE (OUTPATIENT)
Dept: FAMILY MEDICINE | Facility: OTHER | Age: 66
End: 2021-07-04

## 2021-07-06 ENCOUNTER — OFFICE VISIT (OUTPATIENT)
Dept: FAMILY MEDICINE | Facility: OTHER | Age: 66
End: 2021-07-06
Attending: NURSE PRACTITIONER
Payer: MEDICARE

## 2021-07-06 VITALS
SYSTOLIC BLOOD PRESSURE: 142 MMHG | HEART RATE: 62 BPM | RESPIRATION RATE: 19 BRPM | BODY MASS INDEX: 27.18 KG/M2 | DIASTOLIC BLOOD PRESSURE: 86 MMHG | HEIGHT: 67 IN | TEMPERATURE: 97 F | WEIGHT: 173.2 LBS | OXYGEN SATURATION: 98 %

## 2021-07-06 DIAGNOSIS — Z94.2 S/P LUNG TRANSPLANT (H): Chronic | ICD-10-CM

## 2021-07-06 DIAGNOSIS — Z79.4 TYPE 2 DIABETES MELLITUS WITH DIABETIC POLYNEUROPATHY, WITH LONG-TERM CURRENT USE OF INSULIN (H): ICD-10-CM

## 2021-07-06 DIAGNOSIS — S81.812A LACERATION OF LEFT LOWER EXTREMITY, INITIAL ENCOUNTER: Primary | ICD-10-CM

## 2021-07-06 DIAGNOSIS — E11.42 TYPE 2 DIABETES MELLITUS WITH DIABETIC POLYNEUROPATHY, WITH LONG-TERM CURRENT USE OF INSULIN (H): ICD-10-CM

## 2021-07-06 PROCEDURE — G0463 HOSPITAL OUTPT CLINIC VISIT: HCPCS

## 2021-07-06 PROCEDURE — 99213 OFFICE O/P EST LOW 20 MIN: CPT | Performed by: NURSE PRACTITIONER

## 2021-07-06 PROCEDURE — 87077 CULTURE AEROBIC IDENTIFY: CPT | Mod: ZL | Performed by: NURSE PRACTITIONER

## 2021-07-06 PROCEDURE — 87070 CULTURE OTHR SPECIMN AEROBIC: CPT | Mod: ZL | Performed by: NURSE PRACTITIONER

## 2021-07-06 RX ORDER — CIPROFLOXACIN 500 MG/1
500 TABLET, FILM COATED ORAL 2 TIMES DAILY
Qty: 28 TABLET | Refills: 0 | Status: SHIPPED | OUTPATIENT
Start: 2021-07-06 | End: 2021-07-20

## 2021-07-06 RX ORDER — CIPROFLOXACIN 500 MG/1
500 TABLET, FILM COATED ORAL 2 TIMES DAILY
Qty: 20 TABLET | Refills: 0 | Status: SHIPPED | OUTPATIENT
Start: 2021-07-06 | End: 2021-07-06

## 2021-07-06 RX ORDER — MAGNESIUM OXIDE 400 MG/1
800 TABLET ORAL 2 TIMES DAILY
COMMUNITY
Start: 2021-07-01 | End: 2022-10-13

## 2021-07-06 ASSESSMENT — PAIN SCALES - GENERAL: PAINLEVEL: NO PAIN (1)

## 2021-07-06 ASSESSMENT — MIFFLIN-ST. JEOR: SCORE: 1514.26

## 2021-07-06 NOTE — NURSING NOTE
"Chief Complaint   Patient presents with     Laceration     left lower leg     Patient presents to clinic with laceration to lower left leg. He states he walked into the  and cut it. It it red, and has a bit of drainage coming out.     Initial BP (!) 142/86 (BP Location: Right arm, Patient Position: Sitting, Cuff Size: Adult Regular)   Pulse 62   Temp 97  F (36.1  C) (Temporal)   Resp 19   Ht 1.702 m (5' 7\")   Wt 78.6 kg (173 lb 3.2 oz)   SpO2 98%   BMI 27.13 kg/m   Estimated body mass index is 27.13 kg/m  as calculated from the following:    Height as of this encounter: 1.702 m (5' 7\").    Weight as of this encounter: 78.6 kg (173 lb 3.2 oz).     FOOD SECURITY SCREENING QUESTIONS  Hunger Vital Signs:  Within the past 12 months we worried whether our food would run out before we got money to buy more. Never  Within the past 12 months the food we bought just didn't last and we didn't have money to get more. Never      Medication Reconciliation: Complete      Monae Musa   "

## 2021-07-06 NOTE — PROGRESS NOTES
HPI:    Aubrey Duncan is a 66 year old male who presents to clinic today for left leg wound.  He reports on June 25 he hit the lateral side of his left calf on the .  He obtained a laceration to the area.  They have been dressing this daily and monitoring this.  He has noted over the past few days this is becoming more red and irritated.  He does have a history of Pseudomonas with difficulty getting wounds to heal.  Denies any fevers.  Currently blood sugars have been stable.  He continues on immunosuppression due to history of lung transplant.    Past Medical History:   Diagnosis Date     Alpha-1-antitrypsin deficiency (H)      Basal cell carcinoma      CMV (cytomegalovirus infection) (H)     Reacttivation Sept 2013 when valcyte held     DVT of upper extremity (deep vein thrombosis) (H) Sept 2013    Nonocclusive thrombosis extending from the right subclavian vein to the right axillary vein,  Segmental occlusion of right basilic vein in the upper arm. 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    With DVT and thrombocytopenia     Hypertension      Lung transplant status, bilateral (H) Sept 8, 2013    Complicated by HIT and esophageal dysfunction     Sepsis associated hypotension (H) 2/24/2019     Squamous cell carcinoma      Steroid-induced diabetes mellitus (H)      Thrombocytopaenia     due to HIT     Ureteral stone 10/17/2017         Current Outpatient Medications   Medication Sig Dispense Refill     albuterol (PROAIR HFA, PROVENTIL HFA, VENTOLIN HFA) 108 (90 BASE) MCG/ACT inhaler Inhale 2 puffs into the lungs every 6 hours as needed for shortness of breath / dyspnea or wheezing 3 Inhaler 11     azaTHIOprine (IMURAN) 50 MG tablet Take 0.5 tablets (25 mg) by mouth daily 15 tablet 11     blood glucose (NO BRAND SPECIFIED) test strip USE AS DIRECTED FOR  TESTING BLOOD GLUCOSE 3 TIMES DAILY 400 each 3     calcium-vitamin D (CALTRATE) 600-400 MG-UNIT per tablet Take 1 tablet by mouth 2 times daily (with meals) 60 tablet 12     ciprofloxacin (CIPRO) 500 MG tablet Take 1 tablet (500 mg) by mouth 2 times daily for 14 days 28 tablet 0     ferrous sulfate (FEROSUL) 325 (65 Fe) MG tablet Take 325 mg by mouth daily (with breakfast)       fluorouracil (EFUDEX) 5 % cream Apply topically daily Use once per day for 10 days on areas of scalp, forehead and face that are scaled and gritty (one month on the central forehead). Avoid eyes. 40 g 1     furosemide (LASIX) 20 MG tablet Take 1 tablet (20 mg) by mouth daily 90 tablet 4     insulin aspart (NOVOLOG PEN) 100 UNIT/ML pen Take 5 U am, 3 unit(s) non, 4 unit(s) pm of insulin within 30 minutes of start of breakfast, lunch, and dinner. Do not give if blood sugar is less than 70 mg/dl. (Patient taking differently: Take 5 U am, 3 unit(s) non, 5 unit(s) pm of insulin within 30 minutes of start of breakfast, lunch, and dinner. Do not give if blood sugar is less than 70 mg/dl.) 10 mL 11     insulin glargine (LANTUS PEN) 100 UNIT/ML pen Inject 23 Units Subcutaneous every morning (before breakfast) 30 mL 3     insulin pen needle (32G X 4 MM) 32G X 4 MM miscellaneous Use 4 pen needles daily or as directed. Dispense as insurance allows. Dx. Code: E09.9 400 each 11     Lancet Devices (MICROLET NEXT LANCING DEVICE) MISC USE AS DIRECTED 1 each 3     lisinopril (ZESTRIL) 5 MG tablet Take 1 tablet (5 mg) by mouth daily 90 tablet 4     loperamide (IMODIUM) 2 MG capsule Take 1 capsule (2 mg) by mouth 4 times daily as needed for diarrhea 120 capsule 12     magnesium oxide (MAG-OX) 400 (241.3 Mg) MG tablet Take 1 tablet (400 mg) by mouth daily 100 tablet 3     magnesium oxide (MAG-OX) 400 MG tablet TAKE 1 TABLET (400 MG) BY MOUTH DAILY       metoprolol tartrate (LOPRESSOR) 25 MG tablet Take 1 tablet (25 mg) by mouth 2 times daily 180 tablet 4      "multivitamin, therapeutic (THERA-VIT) TABS Take 1 tablet by mouth daily 30 tablet 12     MYFORTIC (BRAND) 180 MG EC tablet Take 180 mg by mouth 2 times daily       omeprazole (PRILOSEC) 20 MG DR capsule Take 1 capsule (20 mg) by mouth 2 times daily (before meals) 180 capsule 3     order for DME Equipment being ordered: diabetic shoes 1 each 0     predniSONE (DELTASONE) 5 MG tablet TAKE ONE TABLET BY MOUTH EVERY MORNING AND TAKE ONE-HALF TABLET BY MOUTH EVERY EVENING 45 tablet 12     rosuvastatin (CRESTOR) 40 MG tablet Take 20mg (half tablet) three times weekly 90 tablet 3     sildenafil (VIAGRA) 25 MG tablet Take 1 tablet (25 mg) by mouth as needed (as needed) 32 tablet 11     sulfamethoxazole-trimethoprim (BACTRIM) 400-80 MG tablet Take 1 tablet by mouth three times a week 13 tablet 11     tacrolimus (GENERIC EQUIVALENT) 0.5 MG capsule TAKE ONE CAPSULE BY MOUTH EVERY MORNING. TOTAL DOSE: 2.5MG IN THE MORNING & 2MG IN THE EVENING 30 capsule 11     tacrolimus (GENERIC EQUIVALENT) 1 MG capsule Take 2 capsules (2 mg) by mouth 2 times daily Total dose is 2.5mg am and 2mg pm 120 capsule 11     valGANciclovir (VALCYTE) 450 MG tablet TAKE ONE TABLETS (450MG) BY MOUTH TWO TIMES A DAY 60 tablet 11       Allergies   Allergen Reactions     Heparin Unknown     HIT positive and AUGUST positive      Heparin (Bovine)      Other reaction(s): Thrombocytopenia, Thromboembolic Event     Heparin Cross Reactors Other (See Comments)     HIT positive and AUGUST positive      Oxycodone Other (See Comments)     Significant lethargy, confusion. Tolerates dilaudid well.      Fluocinolone Other (See Comments)     Tendon problems       Levaquin      Pneumococcal Vaccine Other (See Comments)     Other reaction(s): Fever  \"My arm swelled up like a balloon.\"     Varicella Zoster Immune Globulin Swelling       ROS:  Pertinent positives and negatives are noted in HPI.    EXAM:  BP (!) 142/86 (BP Location: Right arm, Patient Position: Sitting, Cuff Size: " "Adult Regular)   Pulse 62   Temp 97  F (36.1  C) (Temporal)   Resp 19   Ht 1.702 m (5' 7\")   Wt 78.6 kg (173 lb 3.2 oz)   SpO2 98%   BMI 27.13 kg/m    General appearance: well appearing male, in no acute distress  Dermatological: Left lateral calf with 4.8 x 0.8 cm V-shaped laceration.  Some granulation and sloughing noted in the center.  He has approximately 2 cm of erythema surrounding this.  Area is warm to touch and tender.  Psychological: normal affect, alert and pleasant    ASSESSMENT AND PLAN:    1. Laceration of left lower extremity, initial encounter    2. Type 2 diabetes mellitus with diabetic polyneuropathy, with long-term current use of insulin (H)    3. S/P lung transplant (H)      Laceration to left lower leg.  Cellulitis present.  Will treat with ciprofloxacin x14 days due to history of Pseudomonas.  Wound cultures obtained.  Continue with daily dressing changes and follow-up with any signs of worsening infection including fevers, nausea vomiting or other concerns.      BRANDI Manning CNP..................7/6/2021 10:56 AM    "

## 2021-07-08 LAB
BACTERIA SPEC CULT: ABNORMAL
BACTERIA SPEC CULT: ABNORMAL
SPECIMEN SOURCE: ABNORMAL

## 2021-07-13 DIAGNOSIS — Z94.2 LUNG REPLACED BY TRANSPLANT (H): Primary | ICD-10-CM

## 2021-07-25 ENCOUNTER — ALLIED HEALTH/NURSE VISIT (OUTPATIENT)
Dept: FAMILY MEDICINE | Facility: OTHER | Age: 66
End: 2021-07-25
Attending: FAMILY MEDICINE
Payer: MEDICARE

## 2021-07-25 DIAGNOSIS — Z20.822 COVID-19 RULED OUT: Primary | ICD-10-CM

## 2021-07-25 LAB — SARS-COV-2 RNA RESP QL NAA+PROBE: NEGATIVE

## 2021-07-25 PROCEDURE — C9803 HOPD COVID-19 SPEC COLLECT: HCPCS

## 2021-07-25 PROCEDURE — U0003 INFECTIOUS AGENT DETECTION BY NUCLEIC ACID (DNA OR RNA); SEVERE ACUTE RESPIRATORY SYNDROME CORONAVIRUS 2 (SARS-COV-2) (CORONAVIRUS DISEASE [COVID-19]), AMPLIFIED PROBE TECHNIQUE, MAKING USE OF HIGH THROUGHPUT TECHNOLOGIES AS DESCRIBED BY CMS-2020-01-R: HCPCS | Mod: ZL

## 2021-07-25 NOTE — PROGRESS NOTES
Patient is here for covid testing for pre procedure.  Marisol Jaime LPN on 7/25/2021 at 11:15 AM

## 2021-07-29 ENCOUNTER — HOSPITAL ENCOUNTER (OUTPATIENT)
Dept: RESPIRATORY THERAPY | Facility: OTHER | Age: 66
Discharge: HOME | End: 2021-07-29
Attending: INTERNAL MEDICINE | Admitting: FAMILY MEDICINE
Payer: MEDICARE

## 2021-07-29 DIAGNOSIS — J44.9 CHRONIC OBSTRUCTIVE PULMONARY DISEASE, UNSPECIFIED COPD TYPE (H): ICD-10-CM

## 2021-07-29 DIAGNOSIS — J44.9 CHRONIC OBSTRUCTIVE PULMONARY DISEASE, UNSPECIFIED COPD TYPE (H): Primary | ICD-10-CM

## 2021-07-29 DIAGNOSIS — Z94.2 LUNG REPLACED BY TRANSPLANT (H): ICD-10-CM

## 2021-07-29 PROCEDURE — 94726 PLETHYSMOGRAPHY LUNG VOLUMES: CPT | Mod: 26 | Performed by: INTERNAL MEDICINE

## 2021-07-29 PROCEDURE — 94010 BREATHING CAPACITY TEST: CPT

## 2021-07-29 PROCEDURE — 94729 DIFFUSING CAPACITY: CPT

## 2021-07-29 PROCEDURE — 94010 BREATHING CAPACITY TEST: CPT | Mod: 26 | Performed by: INTERNAL MEDICINE

## 2021-07-29 PROCEDURE — 94726 PLETHYSMOGRAPHY LUNG VOLUMES: CPT

## 2021-07-29 PROCEDURE — 94729 DIFFUSING CAPACITY: CPT | Mod: 26 | Performed by: INTERNAL MEDICINE

## 2021-07-29 PROCEDURE — 999N000157 HC STATISTIC RCP TIME EA 10 MIN

## 2021-08-05 DIAGNOSIS — E11.9 DIABETES MELLITUS TYPE 2, DIET-CONTROLLED (H): ICD-10-CM

## 2021-08-09 NOTE — TELEPHONE ENCOUNTER
[id number] ([employer] [facility]) of [place] sent Rx request for the following:      Requested Prescriptions   Pending Prescriptions Disp Refills   Microlet Lancets MISC [Pharmacy Med Name: MICROLET LANCET] 100 each     Sig: USE AS DIRECTED FOR TESTING BLOOD GLUCOSE 4 TIMES DAILY   Routing refill request to provider for review/approval because:   Diabetic Supplies Protocol Failed - 8/9/2021  8:22 AM       Failed - Medication is active on med list       Failed - Recent (6 mo) or future (30 days) visit within the authorizing provider's specialty      blood glucose (NO BRAND SPECIFIED) test strip [Pharmacy Med Name: CONTOUR MEDIC/DME STRIP 50] 300 strip     Sig: USE AS DIRECTED FOR TESTING BLOOD GLUCOSE 3 TIMES DAILY   Last Prescription Date:   7/16/20  Last Fill Qty/Refills:         400, R-3    Routing refill request to provider for review/approval because:   Diabetic Supplies Protocol Failed - 8/9/2021  8:22 AM       Failed - Recent (6 mo) or future (30 days) visit within the authorizing provider's specialty     Last Office Visit:              7/6/21  Future Office visit:           None    Unable to complete prescription refill per RN Medication Refill Policy. Brooke Nicholas RN .............. 8/9/2021  8:25 AM

## 2021-08-10 RX ORDER — LANCETS
EACH MISCELLANEOUS
Qty: 400 EACH | Refills: 1 | Status: SHIPPED | OUTPATIENT
Start: 2021-08-10 | End: 2022-07-13

## 2021-08-17 DIAGNOSIS — Z94.2 S/P LUNG TRANSPLANT (H): Chronic | ICD-10-CM

## 2021-08-17 RX ORDER — TACROLIMUS 1 MG/1
CAPSULE ORAL
Qty: 120 CAPSULE | Refills: 11 | Status: SHIPPED | OUTPATIENT
Start: 2021-08-17 | End: 2021-11-26

## 2021-08-19 DIAGNOSIS — I10 ESSENTIAL HYPERTENSION: ICD-10-CM

## 2021-08-19 DIAGNOSIS — Z94.2 S/P LUNG TRANSPLANT (H): Chronic | ICD-10-CM

## 2021-08-19 RX ORDER — TACROLIMUS 0.5 MG/1
CAPSULE ORAL
Qty: 30 CAPSULE | Refills: 11 | Status: SHIPPED | OUTPATIENT
Start: 2021-08-19 | End: 2021-11-26

## 2021-08-19 RX ORDER — SULFAMETHOXAZOLE AND TRIMETHOPRIM 400; 80 MG/1; MG/1
1 TABLET ORAL
Qty: 13 TABLET | Refills: 11 | Status: SHIPPED | OUTPATIENT
Start: 2021-08-20 | End: 2022-07-29

## 2021-08-19 RX ORDER — METOPROLOL TARTRATE 25 MG/1
25 TABLET, FILM COATED ORAL 2 TIMES DAILY
Qty: 180 TABLET | Refills: 4 | OUTPATIENT
Start: 2021-08-19

## 2021-08-21 ENCOUNTER — MYC MEDICAL ADVICE (OUTPATIENT)
Dept: FAMILY MEDICINE | Facility: OTHER | Age: 66
End: 2021-08-21

## 2021-08-21 DIAGNOSIS — E11.9 DIABETES MELLITUS TYPE 2, DIET-CONTROLLED (H): ICD-10-CM

## 2021-08-23 DIAGNOSIS — I10 ESSENTIAL HYPERTENSION: ICD-10-CM

## 2021-08-23 NOTE — TELEPHONE ENCOUNTER
Please refill RX - metoprolol  Tartrate 25mg.  No refills on file.      Ayse Kinney on 8/23/2021 at 12:10 PM

## 2021-08-24 DIAGNOSIS — Z94.2 LUNG REPLACED BY TRANSPLANT (H): Primary | ICD-10-CM

## 2021-08-24 DIAGNOSIS — I10 ESSENTIAL HYPERTENSION: ICD-10-CM

## 2021-08-24 RX ORDER — METOPROLOL TARTRATE 25 MG/1
25 TABLET, FILM COATED ORAL 2 TIMES DAILY
Qty: 180 TABLET | Refills: 2 | OUTPATIENT
Start: 2021-08-24

## 2021-08-24 RX ORDER — METOPROLOL TARTRATE 25 MG/1
25 TABLET, FILM COATED ORAL 2 TIMES DAILY
Qty: 180 TABLET | Refills: 2 | Status: SHIPPED | OUTPATIENT
Start: 2021-08-24 | End: 2022-07-01

## 2021-08-24 NOTE — TELEPHONE ENCOUNTER
Patient's chart was accessed to determine the status of a refill request - nothing needed at this time as the request was previously addressed.    Brooke Nicholas RN .............. 8/24/2021  10:02 AM
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
## 2021-09-02 ENCOUNTER — IMMUNIZATION (OUTPATIENT)
Dept: FAMILY MEDICINE | Facility: OTHER | Age: 66
End: 2021-09-02
Attending: FAMILY MEDICINE
Payer: MEDICARE

## 2021-09-02 PROCEDURE — 0003A PR COVID VAC PFIZER DIL RECON 30 MCG/0.3 ML IM: CPT

## 2021-09-07 ENCOUNTER — MYC MEDICAL ADVICE (OUTPATIENT)
Dept: FAMILY MEDICINE | Facility: OTHER | Age: 66
End: 2021-09-07

## 2021-09-14 DIAGNOSIS — I10 ESSENTIAL HYPERTENSION: ICD-10-CM

## 2021-09-14 RX ORDER — LISINOPRIL 5 MG/1
5 TABLET ORAL DAILY
Qty: 90 TABLET | Refills: 4 | Status: CANCELLED | OUTPATIENT
Start: 2021-09-14

## 2021-09-14 RX ORDER — FUROSEMIDE 20 MG
20 TABLET ORAL DAILY
Qty: 90 TABLET | Refills: 4 | Status: CANCELLED | OUTPATIENT
Start: 2021-09-14

## 2021-09-17 NOTE — TELEPHONE ENCOUNTER
furosemide (LASIX) 20 MG tablet 90 tablet 4 6/21/2021  No   Sig - Route: Take 1 tablet (20 mg) by mouth daily -      lisinopril (ZESTRIL) 5 MG tablet 90 tablet 4 6/21/2021  No   Sig - Route: Take 1 tablet (5 mg) by mouth daily      To [facility].    [place] mail service requesting    Called patient and patient states he would like refills sent to [place].    Called [place] and they will call [facility] and transfer.    Lenore Walsh RN on 9/17/2021 at 9:53 AM

## 2021-09-18 ENCOUNTER — HEALTH MAINTENANCE LETTER (OUTPATIENT)
Age: 66
End: 2021-09-18

## 2021-09-21 ENCOUNTER — MYC MEDICAL ADVICE (OUTPATIENT)
Dept: FAMILY MEDICINE | Facility: OTHER | Age: 66
End: 2021-09-21

## 2021-09-26 ENCOUNTER — MYC MEDICAL ADVICE (OUTPATIENT)
Dept: FAMILY MEDICINE | Facility: OTHER | Age: 66
End: 2021-09-26

## 2021-09-26 DIAGNOSIS — T14.8XXA OPEN WOUND: Primary | ICD-10-CM

## 2021-09-27 ENCOUNTER — MYC MEDICAL ADVICE (OUTPATIENT)
Dept: FAMILY MEDICINE | Facility: OTHER | Age: 66
End: 2021-09-27

## 2021-09-27 RX ORDER — CIPROFLOXACIN 500 MG/1
500 TABLET, FILM COATED ORAL 2 TIMES DAILY
Qty: 28 TABLET | Refills: 0 | Status: SHIPPED | OUTPATIENT
Start: 2021-09-27 | End: 2021-10-11

## 2021-09-30 ENCOUNTER — TELEPHONE (OUTPATIENT)
Dept: FAMILY MEDICINE | Facility: OTHER | Age: 66
End: 2021-09-30

## 2021-09-30 NOTE — TELEPHONE ENCOUNTER
Novolog 70/30 was sent to [facility] . He doesn't ues this medication. He needs a new script faxed for Novolog flex pen. Needs his address not ours. 120 day supply needed. Needs needles list on the form also.  [address] [place] [id number]  Norma J. Gosselin, LPN .......  9/30/2021  2:16 PM

## 2021-10-12 ENCOUNTER — TELEPHONE (OUTPATIENT)
Dept: TRANSPLANT | Facility: CLINIC | Age: 66
End: 2021-10-12

## 2021-10-12 DIAGNOSIS — E83.42 HYPOMAGNESEMIA: ICD-10-CM

## 2021-10-12 RX ORDER — MAGNESIUM OXIDE 400 MG/1
TABLET ORAL
Qty: 100 TABLET | Refills: 3 | Status: SHIPPED | OUTPATIENT
Start: 2021-10-12 | End: 2022-02-01

## 2021-10-14 ENCOUNTER — TELEPHONE (OUTPATIENT)
Dept: TRANSPLANT | Facility: CLINIC | Age: 66
End: 2021-10-14

## 2021-10-14 NOTE — TELEPHONE ENCOUNTER
Transplant Social Work Services Progress Note    Received fax from [facility] to [facility] requesting updated statement of medical necessity for patient's Valcyte Assistance Program. Facilitated completion of paperwork and Faxed to [facility] at [phone].      Lluvia See, [facility]  Pager: [phone]

## 2021-10-15 DIAGNOSIS — Z94.2 LUNG TRANSPLANT STATUS, BILATERAL (H): ICD-10-CM

## 2021-10-15 RX ORDER — PREDNISONE 5 MG/1
TABLET ORAL
Qty: 45 TABLET | Refills: 12 | Status: SHIPPED | OUTPATIENT
Start: 2021-10-15 | End: 2022-10-17

## 2021-10-18 ENCOUNTER — TELEPHONE (OUTPATIENT)
Dept: TRANSPLANT | Facility: CLINIC | Age: 66
End: 2021-10-18

## 2021-10-18 NOTE — TELEPHONE ENCOUNTER
Spoke with the patient and confirmed pst lung appointments on 11/11/21.  Informed patient an itinerary can be accessed on [employer].

## 2021-11-04 ENCOUNTER — MYC MEDICAL ADVICE (OUTPATIENT)
Dept: FAMILY MEDICINE | Facility: OTHER | Age: 66
End: 2021-11-04

## 2021-11-10 DIAGNOSIS — Z94.2 LUNG REPLACED BY TRANSPLANT (H): Primary | ICD-10-CM

## 2021-11-11 ENCOUNTER — ANCILLARY PROCEDURE (OUTPATIENT)
Dept: GENERAL RADIOLOGY | Facility: CLINIC | Age: 66
End: 2021-11-11
Attending: INTERNAL MEDICINE
Payer: MEDICARE

## 2021-11-11 ENCOUNTER — OFFICE VISIT (OUTPATIENT)
Dept: TRANSPLANT | Facility: CLINIC | Age: 66
End: 2021-11-11
Attending: INTERNAL MEDICINE
Payer: MEDICARE

## 2021-11-11 ENCOUNTER — LAB (OUTPATIENT)
Dept: LAB | Facility: CLINIC | Age: 66
End: 2021-11-11
Attending: INTERNAL MEDICINE
Payer: MEDICARE

## 2021-11-11 VITALS
OXYGEN SATURATION: 97 % | WEIGHT: 173 LBS | DIASTOLIC BLOOD PRESSURE: 80 MMHG | SYSTOLIC BLOOD PRESSURE: 148 MMHG | BODY MASS INDEX: 27.1 KG/M2 | HEART RATE: 73 BPM

## 2021-11-11 DIAGNOSIS — Z94.2 LUNG REPLACED BY TRANSPLANT (H): ICD-10-CM

## 2021-11-11 DIAGNOSIS — Z94.2 LUNG REPLACED BY TRANSPLANT (H): Primary | ICD-10-CM

## 2021-11-11 DIAGNOSIS — E11.9 DIABETES MELLITUS TYPE 2, DIET-CONTROLLED (H): ICD-10-CM

## 2021-11-11 DIAGNOSIS — N18.32 CHRONIC KIDNEY DISEASE, STAGE 3B (H): ICD-10-CM

## 2021-11-11 DIAGNOSIS — Z79.899 OTHER LONG TERM (CURRENT) DRUG THERAPY: ICD-10-CM

## 2021-11-11 DIAGNOSIS — Z79.899 ENCOUNTER FOR LONG-TERM (CURRENT) USE OF HIGH-RISK MEDICATION: ICD-10-CM

## 2021-11-11 DIAGNOSIS — Z79.52 LONG TERM (CURRENT) USE OF SYSTEMIC STEROIDS: ICD-10-CM

## 2021-11-11 LAB
ALBUMIN SERPL-MCNC: 3.3 G/DL (ref 3.4–5)
ALP SERPL-CCNC: 61 U/L (ref 40–150)
ALT SERPL W P-5'-P-CCNC: 67 U/L (ref 0–70)
ANION GAP SERPL CALCULATED.3IONS-SCNC: 8 MMOL/L (ref 3–14)
AST SERPL W P-5'-P-CCNC: 36 U/L (ref 0–45)
BASOPHILS # BLD AUTO: 0 10E3/UL (ref 0–0.2)
BASOPHILS NFR BLD AUTO: 1 %
BILIRUB SERPL-MCNC: 0.3 MG/DL (ref 0.2–1.3)
BUN SERPL-MCNC: 42 MG/DL (ref 7–30)
CALCIUM SERPL-MCNC: 8.5 MG/DL (ref 8.5–10.1)
CHLORIDE BLD-SCNC: 112 MMOL/L (ref 94–109)
CHOLEST SERPL-MCNC: 145 MG/DL
CO2 SERPL-SCNC: 24 MMOL/L (ref 20–32)
CREAT SERPL-MCNC: 1.42 MG/DL (ref 0.66–1.25)
DEPRECATED CALCIDIOL+CALCIFEROL SERPL-MC: 35 UG/L (ref 20–75)
EOSINOPHIL # BLD AUTO: 0.1 10E3/UL (ref 0–0.7)
EOSINOPHIL NFR BLD AUTO: 1 %
ERYTHROCYTE [DISTWIDTH] IN BLOOD BY AUTOMATED COUNT: 13.2 % (ref 10–15)
EXPTIME-PRE: 6.96 SEC
FASTING STATUS PATIENT QL REPORTED: ABNORMAL
FEF2575-%PRED-PRE: 116 %
FEF2575-PRE: 2.82 L/SEC
FEF2575-PRED: 2.43 L/SEC
FEFMAX-%PRED-PRE: 112 %
FEFMAX-PRE: 9.16 L/SEC
FEFMAX-PRED: 8.11 L/SEC
FEV1-%PRED-PRE: 101 %
FEV1-PRE: 3.14 L
FEV1FEV6-PRE: 79 %
FEV1FEV6-PRED: 78 %
FEV1FVC-PRE: 78 %
FEV1FVC-PRED: 77 %
FIFMAX-PRE: 6.19 L/SEC
FVC-%PRED-PRE: 99 %
FVC-PRE: 4.02 L
FVC-PRED: 4.04 L
GFR SERPL CREATININE-BSD FRML MDRD: 50 ML/MIN/1.73M2
GLUCOSE BLD-MCNC: 84 MG/DL (ref 70–99)
HBA1C MFR BLD: 5.4 % (ref 0–5.6)
HCT VFR BLD AUTO: 39.1 % (ref 40–53)
HDLC SERPL-MCNC: 45 MG/DL
HGB BLD-MCNC: 12.7 G/DL (ref 13.3–17.7)
IMM GRANULOCYTES # BLD: 0.1 10E3/UL
IMM GRANULOCYTES NFR BLD: 1 %
INR PPP: 1 (ref 0.85–1.15)
LDLC SERPL CALC-MCNC: 41 MG/DL
LYMPHOCYTES # BLD AUTO: 0.9 10E3/UL (ref 0.8–5.3)
LYMPHOCYTES NFR BLD AUTO: 11 %
MAGNESIUM SERPL-MCNC: 2 MG/DL (ref 1.6–2.3)
MCH RBC QN AUTO: 34.1 PG (ref 26.5–33)
MCHC RBC AUTO-ENTMCNC: 32.5 G/DL (ref 31.5–36.5)
MCV RBC AUTO: 105 FL (ref 78–100)
MONOCYTES # BLD AUTO: 0.4 10E3/UL (ref 0–1.3)
MONOCYTES NFR BLD AUTO: 5 %
NEUTROPHILS # BLD AUTO: 6.7 10E3/UL (ref 1.6–8.3)
NEUTROPHILS NFR BLD AUTO: 81 %
NONHDLC SERPL-MCNC: 100 MG/DL
NRBC # BLD AUTO: 0 10E3/UL
NRBC BLD AUTO-RTO: 0 /100
PHOSPHATE SERPL-MCNC: 2.9 MG/DL (ref 2.5–4.5)
PLATELET # BLD AUTO: 163 10E3/UL (ref 150–450)
POTASSIUM BLD-SCNC: 4.6 MMOL/L (ref 3.4–5.3)
PROT SERPL-MCNC: 7 G/DL (ref 6.8–8.8)
RBC # BLD AUTO: 3.72 10E6/UL (ref 4.4–5.9)
SODIUM SERPL-SCNC: 144 MMOL/L (ref 133–144)
TRIGL SERPL-MCNC: 294 MG/DL
WBC # BLD AUTO: 8.1 10E3/UL (ref 4–11)

## 2021-11-11 PROCEDURE — 87106 FUNGI IDENTIFICATION YEAST: CPT | Performed by: INTERNAL MEDICINE

## 2021-11-11 PROCEDURE — 87205 SMEAR GRAM STAIN: CPT | Performed by: INTERNAL MEDICINE

## 2021-11-11 PROCEDURE — 93010 ELECTROCARDIOGRAM REPORT: CPT | Performed by: INTERNAL MEDICINE

## 2021-11-11 PROCEDURE — 84403 ASSAY OF TOTAL TESTOSTERONE: CPT | Performed by: INTERNAL MEDICINE

## 2021-11-11 PROCEDURE — 83036 HEMOGLOBIN GLYCOSYLATED A1C: CPT | Performed by: PATHOLOGY

## 2021-11-11 PROCEDURE — 36415 COLL VENOUS BLD VENIPUNCTURE: CPT | Performed by: PATHOLOGY

## 2021-11-11 PROCEDURE — 83735 ASSAY OF MAGNESIUM: CPT | Performed by: PATHOLOGY

## 2021-11-11 PROCEDURE — 87102 FUNGUS ISOLATION CULTURE: CPT | Performed by: INTERNAL MEDICINE

## 2021-11-11 PROCEDURE — 80061 LIPID PANEL: CPT | Performed by: PATHOLOGY

## 2021-11-11 PROCEDURE — 71046 X-RAY EXAM CHEST 2 VIEWS: CPT | Performed by: RADIOLOGY

## 2021-11-11 PROCEDURE — 82306 VITAMIN D 25 HYDROXY: CPT | Performed by: INTERNAL MEDICINE

## 2021-11-11 PROCEDURE — 84100 ASSAY OF PHOSPHORUS: CPT | Performed by: PATHOLOGY

## 2021-11-11 PROCEDURE — 82784 ASSAY IGA/IGD/IGG/IGM EACH: CPT | Performed by: INTERNAL MEDICINE

## 2021-11-11 PROCEDURE — 85610 PROTHROMBIN TIME: CPT | Performed by: PATHOLOGY

## 2021-11-11 PROCEDURE — 99215 OFFICE O/P EST HI 40 MIN: CPT | Mod: 25 | Performed by: INTERNAL MEDICINE

## 2021-11-11 PROCEDURE — 87799 DETECT AGENT NOS DNA QUANT: CPT | Performed by: INTERNAL MEDICINE

## 2021-11-11 PROCEDURE — 94375 RESPIRATORY FLOW VOLUME LOOP: CPT | Performed by: INTERNAL MEDICINE

## 2021-11-11 PROCEDURE — 85025 COMPLETE CBC W/AUTO DIFF WBC: CPT | Performed by: PATHOLOGY

## 2021-11-11 PROCEDURE — 80053 COMPREHEN METABOLIC PANEL: CPT | Performed by: PATHOLOGY

## 2021-11-11 RX ORDER — MONTELUKAST SODIUM 10 MG/1
10 TABLET ORAL EVERY EVENING
Qty: 90 TABLET | Refills: 3 | Status: SHIPPED | OUTPATIENT
Start: 2021-11-11 | End: 2022-09-29

## 2021-11-11 RX ORDER — AZITHROMYCIN 250 MG/1
250 TABLET, FILM COATED ORAL DAILY
Qty: 90 TABLET | Refills: 3 | Status: SHIPPED | OUTPATIENT
Start: 2021-11-11 | End: 2021-11-12

## 2021-11-11 ASSESSMENT — PAIN SCALES - GENERAL: PAINLEVEL: NO PAIN (0)
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
## 2021-11-11 NOTE — NURSING NOTE
Transplant Coordinator Note    Reason for visit: Post lung transplant follow up visit   Coordinator: Present (Luz via telephone)  Caregiver:  Pt's wife    Health concerns addressed today:  1. Respiratory: PFTs decreased, chest CT today  2. BPs elevated in AM before takes AM BP meds  3. Pt to follow up with dermatology and will schedule colonoscopy  4. GI/: no complaints    Activity/rehab: Up ad aviva  Oxygen needs: Room air  Pain management/RX: NA  Diabetic management: Managed by endocrine  PJP prophylactic: bactrim    COVID:  1. COVID-19 infection (yes/no, date of most recent positive test):   2. Status/instructions given about COVID-19 vaccine: Pfizer, 2/24/21, 3/24/21, 9/2/21    Pt Education: medications (use/dose/side effects), how/when to call coordinator, frequency of labs, s/s of infection/rejection, call prior to starting any new medications, lab/vital sign book    Health Maintenance:     Last colonoscopy:     Next colonoscopy due: pt due for colonoscopy    Dermatology: pt due for visit    Vaccinations this visit:     Labs, CXR, PFTs reviewed with patient  Medication record reviewed and reconciled  Questions and concerns addressed    Patient Instructions  1. Continue to hydrate with 60-70 oz fluids daily.  2. Continue to exercise daily or most days of the week.  3. Follow up with your primary care provider for annual gender health maintenance procedures.  4. Follow up with colonoscopy schedule.  5. Follow up with annual dermatology visits.  6. It doesn't seem like the COVID vaccine is working well in lung transplant patients. A number of lung transplant patients have gotten sick with COVID even after receiving the vaccines.  Based on our recent experience, it can be life-threatening to get COVID  even after being vaccinated. Please continue to act like you did not get the COVID vaccine - social distancing, wearing a mask, good hand hygiene, etc. If the people around you are vaccinated, it will help reduce  the risk of you getting COVID. All members of your household should be vaccinated.  7. Start taking azithromycin 250 mg daily and Singulair 10 mg daily to help stabilize your lung function.  Get a 12 lead EKG before starting azithromycin.  8. Get PFTs every 2 months.    Next transplant clinic appointment:  3-4 months with CXR, labs and PFTs  Next lab draw: when you get home to [place]      AVS printed at time of check out

## 2021-11-11 NOTE — NURSING NOTE
Chief Complaint   Patient presents with     RECHECK     Lung tx f/u      Blood pressure (!) 148/80, pulse 73, weight 78.5 kg (173 lb), SpO2 97 %.    Sussy Ortega, CMA

## 2021-11-11 NOTE — PATIENT INSTRUCTIONS
Patient Instructions  1. Continue to hydrate with 60-70 oz fluids daily.  2. Continue to exercise daily or most days of the week.  3. Follow up with your primary care provider for annual gender health maintenance procedures.  4. Follow up with colonoscopy schedule.  5. Follow up with annual dermatology visits.  6. It doesn't seem like the COVID vaccine is working well in lung transplant patients. A number of lung transplant patients have gotten sick with COVID even after receiving the vaccines.  Based on our recent experience, it can be life-threatening to get COVID  even after being vaccinated. Please continue to act like you did not get the COVID vaccine - social distancing, wearing a mask, good hand hygiene, etc. If the people around you are vaccinated, it will help reduce the risk of you getting COVID. All members of your household should be vaccinated.  7. Start taking azithromycin 250 mg daily and Singulair 10 mg daily to help stabilize your lung function.  Get a 12 lead EKG before starting azithromycin.  8. Get PFTs every 2 months.    Next transplant clinic appointment:  3-4 months with CXR, labs and PFTs  Next lab draw: when you get home to [place]    ~~~~~~~~~~~~~~~~~~~~~~~~~    Thoracic Transplant Office phone [phone], fax [phone]    Office Hours 8:30 - 5:00     For after-hours urgent issues, please dial [phone], and ask to speak with the Thoracic Transplant Coordinator On-Call.  --------------------  To expedite your medication refill(s), please contact your pharmacy and have them fax a refill request to: [phone]  .   *Please allow 3 business days for routine medication refills.  *Please allow 5 business days for controlled substance medication refills.    **For Diabetic medications and supplies refill(s), please contact your pharmacy and have them contact your Endocrine team.  --------------------  For scheduling appointments call [phone].  --------------------  Please  Note: If you are active on [employer], all future test results will be sent by [employer] message only, and will no longer be called to patient. You may also receive communication directly from your physician.

## 2021-11-12 ENCOUNTER — TELEPHONE (OUTPATIENT)
Dept: TRANSPLANT | Facility: CLINIC | Age: 66
End: 2021-11-12
Payer: MEDICARE

## 2021-11-12 DIAGNOSIS — Z94.2 LUNG REPLACED BY TRANSPLANT (H): ICD-10-CM

## 2021-11-12 LAB
ATRIAL RATE - MUSE: 67 BPM
CMV DNA SPEC NAA+PROBE-ACNC: NOT DETECTED IU/ML
DIASTOLIC BLOOD PRESSURE - MUSE: NORMAL MMHG
EBV DNA COPIES/ML, INSTRUMENT: 1657 COPIES/ML
EBV DNA SPEC NAA+PROBE-LOG#: 3.2 {LOG_COPIES}/ML
IGG SERPL-MCNC: 791 MG/DL (ref 610–1616)
INTERPRETATION ECG - MUSE: NORMAL
P AXIS - MUSE: 10 DEGREES
PR INTERVAL - MUSE: 160 MS
QRS DURATION - MUSE: 72 MS
QT - MUSE: 436 MS
QTC - MUSE: 460 MS
R AXIS - MUSE: -19 DEGREES
SYSTOLIC BLOOD PRESSURE - MUSE: NORMAL MMHG
T AXIS - MUSE: 97 DEGREES
VENTRICULAR RATE- MUSE: 67 BPM

## 2021-11-12 RX ORDER — AZITHROMYCIN 250 MG/1
250 TABLET, FILM COATED ORAL
Qty: 38 TABLET | Refills: 3 | Status: SHIPPED | OUTPATIENT
Start: 2021-11-12 | End: 2022-10-19

## 2021-11-12 NOTE — TELEPHONE ENCOUNTER
Spoke with pt regarding follow up from clinic visit yesterday.  QTc 460 on 11/11/21.  Per provider, plan for pt to start azithromycin 250 mg three times weekly and have follow up 12 lead EKG 1 week after starting medication.  Pt states that he plans to start azithromycin and singulair once he returns home on 11/21.      Plan when pt returns home to [facility]:  - chest CT  - labs (including tacrolimus level)  - start azithromycin and have follow up 12 lead EKG    Pt states he will schedule follow up with dermatology in [place], [place].  Pt denies other questions or concerns and verbalized understanding of plan.

## 2021-11-13 LAB
BACTERIA SPT CULT: NORMAL
GRAM STAIN RESULT: NORMAL

## 2021-11-16 LAB — TESTOST SERPL-MCNC: 310 NG/DL (ref 240–950)

## 2021-11-17 ENCOUNTER — HOSPITAL ENCOUNTER (OUTPATIENT)
Facility: OTHER | Age: 66
End: 2021-11-17
Attending: SURGERY | Admitting: SURGERY
Payer: MEDICARE

## 2021-11-17 DIAGNOSIS — Z12.11 ENCOUNTER FOR SCREENING COLONOSCOPY: Primary | ICD-10-CM

## 2021-11-17 NOTE — TELEPHONE ENCOUNTER
Patient called to F/U on some orders to make his appointment per [facility] they have not received the orders.

## 2021-11-17 NOTE — TELEPHONE ENCOUNTER
Screening Questions for the Scheduling of Screening Colonoscopies   (If Colonoscopy is diagnostic, Provider should review the chart before scheduling.)  Are you younger than 50 or older than 80?  NO  Do you take aspirin or fish oil?  NO (if yes, tell patient to stop 1 week prior to Colonoscopy)  Do you take warfarin (Coumadin), clopidogrel (Plavix), apixaban (Eliquis), dabigatram (Pradaxa), rivaroxaban (Xarelto) or any blood thinner? NO  Do you use oxygen at home?  NO  Do you have kidney disease? NO  Are you on dialysis? NO  Have you had a stroke or heart attack in the last year? NO  Have you had a stent in your heart or any blood vessel in the last year? NO  Have you had a transplant of any organ? YES DOUBLE LUNG  Have you had a colonoscopy or upper endoscopy (EGD) before? YES         When?  08/17/2018  Date of scheduled Colonoscopy. 12/30/2021  Provider [facility]  Pharm[facility]

## 2021-11-18 ENCOUNTER — TELEPHONE (OUTPATIENT)
Dept: TRANSPLANT | Facility: CLINIC | Age: 66
End: 2021-11-18
Payer: MEDICARE

## 2021-11-18 ENCOUNTER — MEDICAL CORRESPONDENCE (OUTPATIENT)
Dept: HEALTH INFORMATION MANAGEMENT | Facility: OTHER | Age: 66
End: 2021-11-18
Payer: MEDICARE

## 2021-11-18 NOTE — LETTER
PHYSICIAN ORDERS      DATE & TIME ISSUED: 2021 12:49 PM  PATIENT NAME: Aubrey Duncan   : 1955     [facility] MR# [if applicable]: [id number]     DIAGNOSIS:  Lung Transplant  Z94.2   Please call patient and schedule the following:  CT of chest without contrast, include inspiratory and expiratory views.     Complete full PFTs in next month        Any questions please call: [place] [phone]    Please fax these results to [phone].         Jamee Birch MD  Solid Organ Transplant Provider

## 2021-11-18 NOTE — TELEPHONE ENCOUNTER
Faxed orders to [facility] for CT of chest, EKG 7 days after starting azithromycin, full PFTs in one month

## 2021-11-18 NOTE — LETTER
PHYSICIAN ORDERS      DATE & TIME ISSUED: 2021 12:56 PM  PATIENT NAME: Aubrey Duncan   : 1955     [facility] MR# [if applicable]: [id number]     DIAGNOSIS:  Lung Transplant  Z94.2   Mirtha Verdugo authorize a 12 lead EKG for our mutual patient (Aubrey Duncan) whom is starting Azithromycin.  He should obtain the EKG one week after he starts this new drug.   Patient is aware of the plan.    Any questions please call: [phone]    Please fax these results to [phone].     Thank you,    Jamee Birch MD  Solid Organ Transplant Provider

## 2021-11-19 RX ORDER — POLYETHYLENE GLYCOL 3350, SODIUM CHLORIDE, SODIUM BICARBONATE, POTASSIUM CHLORIDE 420; 11.2; 5.72; 1.48 G/4L; G/4L; G/4L; G/4L
4000 POWDER, FOR SOLUTION ORAL ONCE
Qty: 4000 ML | Refills: 0 | Status: SHIPPED | OUTPATIENT
Start: 2021-11-19 | End: 2021-11-19

## 2021-11-19 RX ORDER — BISACODYL 5 MG/1
TABLET, DELAYED RELEASE ORAL
Qty: 2 TABLET | Refills: 0 | Status: SHIPPED | OUTPATIENT
Start: 2021-11-19 | End: 2022-02-10

## 2021-11-22 ENCOUNTER — TELEPHONE (OUTPATIENT)
Dept: FAMILY MEDICINE | Facility: OTHER | Age: 66
End: 2021-11-22
Payer: MEDICARE

## 2021-11-22 DIAGNOSIS — E88.01 ALPHA-1-ANTITRYPSIN DEFICIENCY (H): Primary | ICD-10-CM

## 2021-11-22 NOTE — TELEPHONE ENCOUNTER
Transplant team recommended EKG to be completed 1 week after the start of azithromycin.  Fax was received and sent to be scanned.  Order signed.BRANDI Manning CNP on 11/22/2021 at 11:02 AM

## 2021-11-24 ENCOUNTER — LAB (OUTPATIENT)
Dept: LAB | Facility: OTHER | Age: 66
End: 2021-11-24
Attending: INTERNAL MEDICINE
Payer: MEDICARE

## 2021-11-24 DIAGNOSIS — Z94.2 LUNG REPLACED BY TRANSPLANT (H): ICD-10-CM

## 2021-11-24 PROCEDURE — 36415 COLL VENOUS BLD VENIPUNCTURE: CPT | Mod: ZL

## 2021-11-24 PROCEDURE — 80197 ASSAY OF TACROLIMUS: CPT | Mod: ZL

## 2021-11-25 LAB
TACROLIMUS BLD-MCNC: 6 UG/L (ref 5–15)
TME LAST DOSE: NORMAL H
TME LAST DOSE: NORMAL H

## 2021-11-26 ENCOUNTER — TELEPHONE (OUTPATIENT)
Dept: TRANSPLANT | Facility: CLINIC | Age: 66
End: 2021-11-26
Payer: MEDICARE

## 2021-11-26 DIAGNOSIS — Z94.2 S/P LUNG TRANSPLANT (H): Chronic | ICD-10-CM

## 2021-11-26 RX ORDER — TACROLIMUS 1 MG/1
2 CAPSULE ORAL 2 TIMES DAILY
Qty: 120 CAPSULE | Refills: 11 | Status: SHIPPED | OUTPATIENT
Start: 2021-11-26 | End: 2022-03-31

## 2021-11-26 RX ORDER — TACROLIMUS 0.5 MG/1
0.5 CAPSULE ORAL 2 TIMES DAILY
Qty: 60 CAPSULE | Refills: 11 | Status: SHIPPED | OUTPATIENT
Start: 2021-11-26 | End: 2022-03-31

## 2021-11-26 NOTE — TELEPHONE ENCOUNTER
Tacrolimus level 6.0 at 12 hours, on 11/24/21.  Goal 8-10.   Current dose 2.5 mg in AM, 2 mg in PM    Dose changed to 2.5 mg in AM, 2.5 mg in PM   Recheck level in 7-10 days.    Discussed with pt.

## 2021-12-05 ENCOUNTER — ALLIED HEALTH/NURSE VISIT (OUTPATIENT)
Dept: FAMILY MEDICINE | Facility: OTHER | Age: 66
End: 2021-12-05
Attending: INTERNAL MEDICINE
Payer: MEDICARE

## 2021-12-05 DIAGNOSIS — Z20.822 COVID-19 RULED OUT: Primary | ICD-10-CM

## 2021-12-05 PROCEDURE — U0005 INFEC AGEN DETEC AMPLI PROBE: HCPCS | Mod: ZL

## 2021-12-05 PROCEDURE — C9803 HOPD COVID-19 SPEC COLLECT: HCPCS

## 2021-12-06 LAB — SARS-COV-2 RNA RESP QL NAA+PROBE: NEGATIVE

## 2021-12-09 ENCOUNTER — HOSPITAL ENCOUNTER (OUTPATIENT)
Dept: RESPIRATORY THERAPY | Facility: OTHER | Age: 66
End: 2021-12-09
Attending: INTERNAL MEDICINE
Payer: MEDICARE

## 2021-12-09 ENCOUNTER — HOSPITAL ENCOUNTER (OUTPATIENT)
Dept: CT IMAGING | Facility: OTHER | Age: 66
End: 2021-12-09
Attending: INTERNAL MEDICINE
Payer: MEDICARE

## 2021-12-09 DIAGNOSIS — Z94.2 LUNG REPLACED BY TRANSPLANT (H): ICD-10-CM

## 2021-12-09 LAB
BACTERIA SPT CULT: ABNORMAL
BACTERIA SPT CULT: ABNORMAL

## 2021-12-09 PROCEDURE — 94010 BREATHING CAPACITY TEST: CPT | Mod: 26 | Performed by: INTERNAL MEDICINE

## 2021-12-09 PROCEDURE — 71250 CT THORAX DX C-: CPT

## 2021-12-09 PROCEDURE — 94010 BREATHING CAPACITY TEST: CPT

## 2021-12-10 ENCOUNTER — TELEPHONE (OUTPATIENT)
Dept: TRANSPLANT | Facility: CLINIC | Age: 66
End: 2021-12-10
Payer: MEDICARE

## 2021-12-10 NOTE — TELEPHONE ENCOUNTER
Received phone call from pt's wife requesting information regarding if/when pt should receive monoclonal antibody treatment.  Gave information regarding monoclonal antibody treatment as treatment for COVID and prevention of COVID if pt comes in contact with someone who tests positive for COVID.  Encouraged her to reach out with further questions or concerns.  Pt is not symptomatic at this time and has not been in contact with anyone with COVID.

## 2021-12-14 DIAGNOSIS — Z94.2 LUNG REPLACED BY TRANSPLANT (H): ICD-10-CM

## 2021-12-14 RX ORDER — AZATHIOPRINE 50 MG/1
25 TABLET ORAL DAILY
Qty: 15 TABLET | Refills: 11 | Status: SHIPPED | OUTPATIENT
Start: 2021-12-14 | End: 2022-11-17

## 2021-12-16 ENCOUNTER — TELEPHONE (OUTPATIENT)
Dept: TRANSPLANT | Facility: CLINIC | Age: 66
End: 2021-12-16
Payer: MEDICARE

## 2021-12-16 DIAGNOSIS — Z94.2 LUNG REPLACED BY TRANSPLANT (H): Primary | ICD-10-CM

## 2021-12-16 NOTE — TELEPHONE ENCOUNTER
Pt had a CT chest with inspiratory and expiratory views completed on 12/9 to further investigate the cause of his declining lung function.     Impression:   - No focal consolidation, nodularity or fibrosis.   - Mosaic attenuation with exhalation suggests small airways   disease/air-trapping. Correlate with PFTs.   - New right thyroid nodule. Recommend ultrasound for further assessment.      Pt was started on Singulair and azithromycin at last clinic visit.  Plan for pt to have repeat PFTs every 8 weeks.  Pt has follow up with Dr. Murphy on 2/10.  Pt to have repeat labs soon. Per Dr. Birch, not further follow up needed at this time.    Pt updated of plan for thyroid US, PFTs (to be completed before January 6th), and labs.  Pt verbalized understanding of plan and states that he will get these things scheduled.

## 2021-12-20 ENCOUNTER — MYC MEDICAL ADVICE (OUTPATIENT)
Dept: FAMILY MEDICINE | Facility: OTHER | Age: 66
End: 2021-12-20
Payer: MEDICARE

## 2021-12-20 DIAGNOSIS — E09.9 STEROID-INDUCED DIABETES MELLITUS (H): ICD-10-CM

## 2021-12-20 DIAGNOSIS — T38.0X5A STEROID-INDUCED DIABETES MELLITUS (H): ICD-10-CM

## 2021-12-20 DIAGNOSIS — E11.9 DIABETES MELLITUS TYPE 2, DIET-CONTROLLED (H): ICD-10-CM

## 2021-12-20 NOTE — TELEPHONE ENCOUNTER
Please call [employer] at [phone] and ask if they sent needles with his last shipment of NovoLog to [facility].  If they did not, how can I get these ordered for him?  Do I need to send a new order or can give a verbal request for needles? Hoa Olivarez, BRANDI CNP on 12/20/2021 at 3:34 PM

## 2021-12-30 ENCOUNTER — ALLIED HEALTH/NURSE VISIT (OUTPATIENT)
Dept: FAMILY MEDICINE | Facility: OTHER | Age: 66
End: 2021-12-30
Attending: FAMILY MEDICINE
Payer: MEDICARE

## 2021-12-30 DIAGNOSIS — Z20.822 COVID-19 RULED OUT: Primary | ICD-10-CM

## 2021-12-30 PROCEDURE — C9803 HOPD COVID-19 SPEC COLLECT: HCPCS

## 2021-12-30 PROCEDURE — U0003 INFECTIOUS AGENT DETECTION BY NUCLEIC ACID (DNA OR RNA); SEVERE ACUTE RESPIRATORY SYNDROME CORONAVIRUS 2 (SARS-COV-2) (CORONAVIRUS DISEASE [COVID-19]), AMPLIFIED PROBE TECHNIQUE, MAKING USE OF HIGH THROUGHPUT TECHNOLOGIES AS DESCRIBED BY CMS-2020-01-R: HCPCS | Mod: ZL

## 2021-12-31 DIAGNOSIS — Z94.2 LUNG REPLACED BY TRANSPLANT (H): Primary | ICD-10-CM

## 2021-12-31 LAB — SARS-COV-2 RNA RESP QL NAA+PROBE: NEGATIVE

## 2022-01-03 ENCOUNTER — HOSPITAL ENCOUNTER (OUTPATIENT)
Dept: RESPIRATORY THERAPY | Facility: OTHER | Age: 67
End: 2022-01-03
Attending: INTERNAL MEDICINE
Payer: MEDICARE

## 2022-01-03 ENCOUNTER — HOSPITAL ENCOUNTER (OUTPATIENT)
Dept: ULTRASOUND IMAGING | Facility: OTHER | Age: 67
End: 2022-01-03
Attending: INTERNAL MEDICINE
Payer: MEDICARE

## 2022-01-03 DIAGNOSIS — Z94.2 LUNG REPLACED BY TRANSPLANT (H): ICD-10-CM

## 2022-01-03 PROCEDURE — 94010 BREATHING CAPACITY TEST: CPT | Mod: 26 | Performed by: INTERNAL MEDICINE

## 2022-01-03 PROCEDURE — 94010 BREATHING CAPACITY TEST: CPT

## 2022-01-03 PROCEDURE — 76536 US EXAM OF HEAD AND NECK: CPT

## 2022-01-10 DIAGNOSIS — E11.42 TYPE 2 DIABETES MELLITUS WITH DIABETIC POLYNEUROPATHY, WITH LONG-TERM CURRENT USE OF INSULIN (H): ICD-10-CM

## 2022-01-10 DIAGNOSIS — Z94.2 LUNG REPLACED BY TRANSPLANT (H): Primary | ICD-10-CM

## 2022-01-10 DIAGNOSIS — Z79.4 TYPE 2 DIABETES MELLITUS WITH DIABETIC POLYNEUROPATHY, WITH LONG-TERM CURRENT USE OF INSULIN (H): ICD-10-CM

## 2022-01-10 NOTE — PROGRESS NOTES
Thyroid ultrasound on 1/3 reviewed by Dr. Birch.      Impression: Multiple benign-appearing thyroid nodules including 2 larger cystic nodules.      Recommending TSH Free T4, T3.  Ordered for pt's next labs on 1/13.

## 2022-01-13 ENCOUNTER — LAB (OUTPATIENT)
Dept: LAB | Facility: OTHER | Age: 67
End: 2022-01-13
Attending: INTERNAL MEDICINE
Payer: MEDICARE

## 2022-01-13 DIAGNOSIS — E11.42 TYPE 2 DIABETES MELLITUS WITH DIABETIC POLYNEUROPATHY, WITH LONG-TERM CURRENT USE OF INSULIN (H): ICD-10-CM

## 2022-01-13 DIAGNOSIS — Z79.4 TYPE 2 DIABETES MELLITUS WITH DIABETIC POLYNEUROPATHY, WITH LONG-TERM CURRENT USE OF INSULIN (H): ICD-10-CM

## 2022-01-13 DIAGNOSIS — Z94.2 LUNG REPLACED BY TRANSPLANT (H): ICD-10-CM

## 2022-01-13 LAB
ANION GAP SERPL CALCULATED.3IONS-SCNC: 8 MMOL/L (ref 3–14)
BUN SERPL-MCNC: 37 MG/DL (ref 7–25)
CALCIUM SERPL-MCNC: 9.3 MG/DL (ref 8.6–10.3)
CHLORIDE BLD-SCNC: 105 MMOL/L (ref 98–107)
CO2 SERPL-SCNC: 26 MMOL/L (ref 21–31)
CREAT SERPL-MCNC: 1.21 MG/DL (ref 0.7–1.3)
ERYTHROCYTE [DISTWIDTH] IN BLOOD BY AUTOMATED COUNT: 12.8 % (ref 10–15)
GFR SERPL CREATININE-BSD FRML MDRD: 65 ML/MIN/1.73M2
GLUCOSE BLD-MCNC: 87 MG/DL (ref 70–105)
HCT VFR BLD AUTO: 41.2 % (ref 40–53)
HGB BLD-MCNC: 13.4 G/DL (ref 13.3–17.7)
MAGNESIUM SERPL-MCNC: 1.7 MG/DL (ref 1.9–2.7)
MCH RBC QN AUTO: 34.4 PG (ref 26.5–33)
MCHC RBC AUTO-ENTMCNC: 32.5 G/DL (ref 31.5–36.5)
MCV RBC AUTO: 106 FL (ref 78–100)
PLATELET # BLD AUTO: 176 10E3/UL (ref 150–450)
POTASSIUM BLD-SCNC: 4.8 MMOL/L (ref 3.5–5.1)
RBC # BLD AUTO: 3.89 10E6/UL (ref 4.4–5.9)
SODIUM SERPL-SCNC: 139 MMOL/L (ref 134–144)
T3FREE SERPL-MCNC: 2.9 PG/ML (ref 2.5–3.9)
TSH SERPL DL<=0.005 MIU/L-ACNC: 1.16 MU/L (ref 0.4–4)
WBC # BLD AUTO: 6.9 10E3/UL (ref 4–11)

## 2022-01-13 PROCEDURE — 85027 COMPLETE CBC AUTOMATED: CPT | Mod: ZL

## 2022-01-13 PROCEDURE — 83735 ASSAY OF MAGNESIUM: CPT | Mod: ZL

## 2022-01-13 PROCEDURE — 84443 ASSAY THYROID STIM HORMONE: CPT | Mod: ZL

## 2022-01-13 PROCEDURE — 80048 BASIC METABOLIC PNL TOTAL CA: CPT | Mod: ZL

## 2022-01-13 PROCEDURE — 84481 FREE ASSAY (FT-3): CPT | Mod: ZL

## 2022-01-13 PROCEDURE — 80197 ASSAY OF TACROLIMUS: CPT | Mod: ZL

## 2022-01-13 PROCEDURE — 36415 COLL VENOUS BLD VENIPUNCTURE: CPT | Mod: ZL

## 2022-01-14 LAB
CMV DNA SPEC NAA+PROBE-ACNC: NOT DETECTED IU/ML
TACROLIMUS BLD-MCNC: 9.6 UG/L (ref 5–15)
TME LAST DOSE: NORMAL H
TME LAST DOSE: NORMAL H

## 2022-01-14 NOTE — RESULT ENCOUNTER NOTE
Tacrolimus level 9.6 at 13.5 hours, on 1/13/22.  Goal 8-10.   Current dose 2.5 mg in AM, 2.5 mg in PM    Level at goal.  No dose change.    [employer] message sent

## 2022-01-14 NOTE — RESULT ENCOUNTER NOTE
Pt had a thyroid US on 1/3/22, which showed multiple benign-appearing thyroid nodules including 2 larger cystic nodules.  TSH and T3 labs normal.  Per Dr. Birch, plan for repeat thyroid ultrasound in 1 year.

## 2022-01-19 ENCOUNTER — ALLIED HEALTH/NURSE VISIT (OUTPATIENT)
Dept: RESEARCH | Facility: CLINIC | Age: 67
End: 2022-01-19
Payer: MEDICARE

## 2022-01-19 DIAGNOSIS — Z00.6 EXAMINATION OF PARTICIPANT OR CONTROL IN CLINICAL RESEARCH: Primary | ICD-10-CM

## 2022-01-20 NOTE — PROGRESS NOTES
Surgery Clinical Trials Office Informed Consent Process Documentation  Tracking Graft Antigen-Specific CD4+ T-cells in Lung Transplant Recipients.  IRB#[id number]    ICF Version Date 8/27/2021 / IRB Approval Date: 10/11/2021     Date of Telephone Consent: 1/19/2022    This patient is eligible for a research study run by the Surgical Clinical Trials Office. The patient was mailed an invitation package which included an invitation letter from Dr. Martínez, an informed consent form, a HIPAA form, and a pre-stamped return envelope, all IRB approved. The patient has been contacted by telephone and expressed interest in participating after reading forms and having the study explained to them by research team. Study team member answered any questions patient had at the time. Patient agreed to sign forms and mail back to research coordinator at Surgical Clinical Trials Office.     Date invitation package mailed: 1/6/2022  Date research team made first contact: 1/19/2022  Did patient verbally relay consent over phone: [x] Yes []   No                Chart will be updated once/if consent forms are received by Surgical Clinical Trials Office, within approximately one week of arrival.     Study Coordinators:  Korina Webster  [phone]    [email or web address]      :      Marvin Dickinson       [phone]    [email or web address]  PI:  [email or web address]

## 2022-01-25 DIAGNOSIS — E11.9 DIABETES MELLITUS TYPE 2, DIET-CONTROLLED (H): ICD-10-CM

## 2022-01-26 NOTE — TELEPHONE ENCOUNTER
[facility] ([employer]) of [employer] sent Rx request for the following:      Requested Prescriptions   Pending Prescriptions Disp Refills     blood glucose (NO BRAND SPECIFIED) test strip [Pharmacy Med Name: CONTOUR MEDIC/DME STRIP 50] 300 strip 0     Sig: USE AS DIRECTED FOR TESTINGBLOOD GLUCOSE 3 TIMES DAILY. DISPENSE AS COVERED BY INSURANCE. DX. CODE: E11.9.   Last Prescription Date:   8/10/21  Last Fill Qty/Refills:         400, R-1    Last Office Visit:              6/21/21   Future Office visit:           None  Routing refill request to provider for review/approval because:    Diabetic Supplies Protocol Failed - 1/26/2022  2:35 PM        Failed - Recent (6 mo) or future (30 days) visit within the authorizing provider's specialty     Unable to complete prescription refill per RN Medication Refill Policy. Brooke Nicholas RN .............. 1/26/2022  2:37 PM

## 2022-01-27 ENCOUNTER — ALLIED HEALTH/NURSE VISIT (OUTPATIENT)
Dept: RESEARCH | Facility: CLINIC | Age: 67
End: 2022-01-27
Payer: MEDICARE

## 2022-01-27 DIAGNOSIS — Z00.6 EXAMINATION OF PARTICIPANT OR CONTROL IN CLINICAL RESEARCH: Primary | ICD-10-CM

## 2022-01-27 NOTE — PROGRESS NOTES
Surgery Clinical Trials Office Informed Consent Process Documentation  Tracking Graft Antigen-Specific CD4+ T-cells in Lung Transplant Recipients.  IRB#[id number]    ICF Version Date 8/27/2021 / IRB Approval Date: 10/11/2021     Date of Consent Confirmation Call: 1/26/2022    The subject was screened and meets all of the inclusion criteria and none of the exclusion criteria is met. This subject was previously mailed a consent form and contacted by the research team. The subject verbally confirmed interest over the phone and mailed back a signed HIPAA form and an informed consent form. This note is documenting that the study team has received the enrollment forms and that the patient was re contacted by the study team and confirmed enrollment over the phone.     The subject was told:  -that the study involves research   -the purpose of the research study  -the expected duration of the study and the approximate number of subject sought  -of procedures that are identified as experimental  -of reasonably foreseeable risks or discomforts to the subject  -of any benefits to the subject or others that may be expected from the research  -of alternative procedures and/or treatment  -how the confidentiality of records would be maintained  -whether or not compensation and medical treatments are available should injury occur as a result of the study  -who to contact if they have questions related to the research study or questions regarding research subjects' rights  -that participation is completely voluntary and that their decision to or not to participate will have no impact on their relationships with the [facility] and the staff    No study procedures were completed prior to the consent being obtained.  The use of historical information (lab or assessments) used for the purpose of the study was approved by subject.  The subject was fully aware that we would be reviewing their medical record for the study.  The subject demonstrated  an understanding of what the study involved.  Specifically, how this study differed from standard of care at our center and what was required of the subject as part of the study.  The subject reviewed the consent form and was given the opportunity to ask questions before signing.  Questions and concerns were answered by the study staff and/or study physician.  A copy of the signed informed consent document was offered to be mailed back to the subject:  [x] Yes [] No   Patient declined a copy.   The consent require the use of a :   [] Yes [x]   No                              A 'short form' consent was used:     [] Yes [x]  No                                                                                       If yes, provide name of witness:   The subject required a legally-authorized representative (LAR) to sign on their behalf:                                                    [] Yes    [x] No                  If yes, please record name of LAR:     Questions to Evaluate Subject Comprehension of Study:     Question: Adequate Response? If No, explain what actions were taken   What is being studied? [x] Yes   [] No     If you participate, what will be different than if you decide not to participate?  [x] Yes  [] No     How long will the study last; will you be required to return for visits? [x] Yes  [] No     What kinds of risks are there? [x] Yes  [] No        Do you understand that you can withdraw consent at any time and for any reason while participating in the study? [x] Yes  [] No       Study Coordinators:  Korina Webster  [phone]    [email or web address]      :      Marvin Dickinson       [phone]    [email or web address]  PI:  [email or web address]

## 2022-02-01 ENCOUNTER — OFFICE VISIT (OUTPATIENT)
Dept: DERMATOLOGY | Facility: OTHER | Age: 67
End: 2022-02-01
Attending: DERMATOLOGY
Payer: MEDICARE

## 2022-02-01 VITALS
OXYGEN SATURATION: 97 % | HEIGHT: 67 IN | RESPIRATION RATE: 17 BRPM | BODY MASS INDEX: 26.06 KG/M2 | DIASTOLIC BLOOD PRESSURE: 88 MMHG | WEIGHT: 166 LBS | TEMPERATURE: 97.7 F | HEART RATE: 70 BPM | SYSTOLIC BLOOD PRESSURE: 146 MMHG

## 2022-02-01 DIAGNOSIS — L57.0 KERATOSIS, ACTINIC: ICD-10-CM

## 2022-02-01 DIAGNOSIS — L57.0 ACTINIC KERATOSIS: Primary | ICD-10-CM

## 2022-02-01 PROCEDURE — G0463 HOSPITAL OUTPT CLINIC VISIT: HCPCS

## 2022-02-01 PROCEDURE — 17110 DESTRUCTION B9 LES UP TO 14: CPT | Performed by: DERMATOLOGY

## 2022-02-01 PROCEDURE — 17110 DESTRUCTION B9 LES UP TO 14: CPT

## 2022-02-01 RX ORDER — FLUOROURACIL 50 MG/G
CREAM TOPICAL DAILY
Qty: 40 G | Refills: 1 | Status: ON HOLD | OUTPATIENT
Start: 2022-02-01 | End: 2024-01-11

## 2022-02-01 ASSESSMENT — MIFFLIN-ST. JEOR: SCORE: 1481.6

## 2022-02-01 ASSESSMENT — PAIN SCALES - GENERAL: PAINLEVEL: NO PAIN (0)

## 2022-02-01 NOTE — LETTER
2022       RE: Aubrey Hamlin  [address]  [address] [place] [id number]  [facility] [id number]     Dear Colleague,    Thank you for referring your patient, Aubrey Hamlin, to the [facility]. Please see a copy of my visit note below.    Visit Date: 2022    SUBJECTIVE:  Aubrey is a lung transplant patient whom I have seen in the [place] for many years, who now transferred to have his skin care here.  He has not had any trouble in the past few months.  He has a tube of 5-FU that he and his wife use on suspicious lesions from time to time, which I think is just fine.  He comes in today for a general skin check.    OBJECTIVE:  Exam shows a healthy gentleman in no distress, somewhat pale probably related to his medications and lung transplant status.  We checked his face, neck, chest, back, arms and legs, and found no cancers.  We did see some lesions that were probably early actinic keratoses, 1 on the right jawline.  I treated this with liquid nitrogen and advised that in about 2-3 weeks that they apply 5-FU to this area.  He had a pustular lesion on the chin that is coming in usual, looked harmless.      ASSESSMENT:  No skin cancers in a transplant patient on immunosuppressive medication.    PLAN:  Reassured.  Return in 4 months.  Call me if he notices any new lesions that are papular and tender as often squamous cells present with .    MEDICATIONS AND ALLERGIES:  Reviewed.    Return in 4 months.    SAMMI Luz MD        D: 2022   T: 2022   MT: PAKMT    Name:     AUBREY HAMLIN  MRN:      [id number]        Account:    [id number]   :      1955           Visit Date: 2022     Document: [id number]        Again, thank you for allowing me to participate in the care of your patient.      Sincerely,    SAMMI Luz MD

## 2022-02-01 NOTE — PROGRESS NOTES
Visit Date: 2022    SUBJECTIVE:  Aubrey is a lung transplant patient whom I have seen in the [place] for many years, who now transferred to have his skin care here.  He has not had any trouble in the past few months.  He has a tube of 5-FU that he and his wife use on suspicious lesions from time to time, which I think is just fine.  He comes in today for a general skin check.    OBJECTIVE:  Exam shows a healthy gentleman in no distress, somewhat pale probably related to his medications and lung transplant status.  We checked his face, neck, chest, back, arms and legs, and found no cancers.  We did see some lesions that were probably early actinic keratoses, 1 on the right jawline.  I treated this with liquid nitrogen and advised that in about 2-3 weeks that they apply 5-FU to this area.  He had a pustular lesion on the chin that is coming in usual, looked harmless.      ASSESSMENT:  No skin cancers in a transplant patient on immunosuppressive medication.    PLAN:  Reassured.  Return in 4 months.  Call me if he notices any new lesions that are papular and tender as often squamous cells present with .    MEDICATIONS AND ALLERGIES:  Reviewed.    Return in 4 months.    SAMMI Luz MD        D: 2022   T: 2022   MT: PAKMT    Name:     AUBREY HAMLIN  MRN:      [id number]        Account:    [id number]   :      1955           Visit Date: 2022     Document: [id number]

## 2022-02-01 NOTE — NURSING NOTE
"Chief Complaint   Patient presents with     Skin Check     annual check       Initial BP (!) 146/88 (BP Location: Left arm, Cuff Size: Adult Regular)   Pulse 70   Temp 97.7  F (36.5  C) (Tympanic)   Resp 17   Ht 1.702 m (5' 7\")   Wt 75.3 kg (166 lb)   SpO2 97%   BMI 26.00 kg/m   Estimated body mass index is 26 kg/m  as calculated from the following:    Height as of this encounter: 1.702 m (5' 7\").    Weight as of this encounter: 75.3 kg (166 lb).  Medication Reconciliation: complete  Kristen Dixon LPN  "

## 2022-02-03 ENCOUNTER — TELEPHONE (OUTPATIENT)
Dept: TRANSPLANT | Facility: CLINIC | Age: 67
End: 2022-02-03

## 2022-02-03 ENCOUNTER — TELEPHONE (OUTPATIENT)
Dept: NURSING | Facility: CLINIC | Age: 67
End: 2022-02-03

## 2022-02-03 ENCOUNTER — OFFICE VISIT (OUTPATIENT)
Dept: FAMILY MEDICINE | Facility: OTHER | Age: 67
End: 2022-02-03
Attending: NURSE PRACTITIONER
Payer: MEDICARE

## 2022-02-03 VITALS
SYSTOLIC BLOOD PRESSURE: 120 MMHG | BODY MASS INDEX: 26 KG/M2 | WEIGHT: 166 LBS | DIASTOLIC BLOOD PRESSURE: 70 MMHG | RESPIRATION RATE: 20 BRPM | OXYGEN SATURATION: 97 % | HEART RATE: 64 BPM | TEMPERATURE: 97.9 F

## 2022-02-03 DIAGNOSIS — Z94.2 LUNG REPLACED BY TRANSPLANT (H): Primary | ICD-10-CM

## 2022-02-03 DIAGNOSIS — U07.1 2019 NOVEL CORONAVIRUS DISEASE (COVID-19): ICD-10-CM

## 2022-02-03 DIAGNOSIS — Z94.2 S/P LUNG TRANSPLANT (H): ICD-10-CM

## 2022-02-03 DIAGNOSIS — J44.9 CHRONIC OBSTRUCTIVE PULMONARY DISEASE, UNSPECIFIED COPD TYPE (H): ICD-10-CM

## 2022-02-03 DIAGNOSIS — J02.9 SORE THROAT: ICD-10-CM

## 2022-02-03 DIAGNOSIS — R05.9 COUGH: Primary | ICD-10-CM

## 2022-02-03 LAB
FLUAV RNA SPEC QL NAA+PROBE: NEGATIVE
FLUBV RNA RESP QL NAA+PROBE: NEGATIVE
RSV RNA SPEC NAA+PROBE: NEGATIVE
SARS-COV-2 RNA RESP QL NAA+PROBE: POSITIVE

## 2022-02-03 PROCEDURE — C9803 HOPD COVID-19 SPEC COLLECT: HCPCS | Performed by: NURSE PRACTITIONER

## 2022-02-03 PROCEDURE — 99214 OFFICE O/P EST MOD 30 MIN: CPT | Performed by: NURSE PRACTITIONER

## 2022-02-03 PROCEDURE — 87637 SARSCOV2&INF A&B&RSV AMP PRB: CPT | Mod: ZL | Performed by: NURSE PRACTITIONER

## 2022-02-03 PROCEDURE — G0463 HOSPITAL OUTPT CLINIC VISIT: HCPCS

## 2022-02-03 PROCEDURE — G0463 HOSPITAL OUTPT CLINIC VISIT: HCPCS | Mod: 25

## 2022-02-03 ASSESSMENT — ENCOUNTER SYMPTOMS
WHEEZING: 0
SHORTNESS OF BREATH: 0
COUGH: 1
GASTROINTESTINAL NEGATIVE: 1
EYES NEGATIVE: 1
CARDIOVASCULAR NEGATIVE: 1
SORE THROAT: 1
CONSTITUTIONAL NEGATIVE: 1
RHINORRHEA: 1

## 2022-02-03 ASSESSMENT — PAIN SCALES - GENERAL: PAINLEVEL: NO PAIN (0)

## 2022-02-03 NOTE — TELEPHONE ENCOUNTER
Hoa, an NP from [facility], calls as she saw patient in clinic today and he tested positive for COVID. Symptom onset 8 days ago with cold symptoms including cough, runny nose, and sinus congestion. She will advise patient to fill out [facility] form online for MoA referral. Reviewed with Dr. Murphy and plan to have patient hold Imuran, 30 days of Xarelto per protocol. Discussed with patient and advised he monitor his oxygen sats at least 4x daily and let us know if they are consistently less than 94% as well as if symptoms or breathing worsen. He reports oxygen sats were 97% at clinic today. He also reports he's felt the best he's felt in the last week today. Will change visit next week to virtual. 20 day isolation from date of symptom onset at least pending symptom improvement.     Estimated Creatinine Clearance: 62.2 mL/min (based on SCr of 1.21 mg/dL).

## 2022-02-03 NOTE — PROGRESS NOTES
Assessment & Plan   Problem List Items Addressed This Visit        Respiratory    Chronic obstructive pulmonary disease (H)    Relevant Orders    Symptomatic; Yes; 1/31/2022 Influenza A/B & SARS-CoV2 (COVID-19) Virus PCR Multiplex Nose       Endocrine    Steroid-induced diabetes mellitus (H)    Relevant Medications    insulin aspart (NOVOLOG PEN) 100 UNIT/ML pen       Other    S/P lung transplant (H) (Chronic)    Relevant Orders    Symptomatic; Yes; 1/31/2022 Influenza A/B & SARS-CoV2 (COVID-19) Virus PCR Multiplex Nose      Other Visit Diagnoses     Cough    -  Primary    Relevant Orders    Symptomatic; Yes; 1/31/2022 Influenza A/B & SARS-CoV2 (COVID-19) Virus PCR Multiplex Nose    Sore throat        Relevant Orders    Symptomatic; Yes; 1/31/2022 Influenza A/B & SARS-CoV2 (COVID-19) Virus PCR Multiplex Nose      Multiplex swab for Covid and influenza was obtained today.  I opted to test him for 3-day Covid test due to his with depression with status post lung transplant as he may qualify for antiviral treatment if he is positive and he has had symptoms for 3 days already.  He is aware of this and we will follow-up with test results when available.  In the meantime we will continue with symptomatic management and close follow-up.  He will also reach out to his transplant team to notify them of his status.    Upon calling patient about positive test it was clarified that he has had his symptoms for 7-8 days.  Call was placed to his transplant team who suggested that he apply for the monoclonal antibodies and they will reach out to him for any further directions regarding medications, monitoring, etc.  I did call patient and let him know this information as well.    Ordering of each unique test  36 minutes spent on the date of the encounter doing chart review, history and exam, documentation and further activities per the note       BMI:   Estimated body mass index is 26 kg/m  as calculated from the following:     Patient presents in follow up for Morphea.  Currently off treatment with MTX as patient had not had labs completed.    Denies known Latex allergy or symptoms of Latex sensitivity.  Medications reviewed and updated.  Tobacco verified.         "Height as of 2/1/22: 1.702 m (5' 7\").    Weight as of this encounter: 75.3 kg (166 lb).       No follow-ups on file.    BRANDI Manning [facility]    Subjective   Aubrey is a 66 year old who presents for the following health issues     History of Present Illness       He eats 2-3 servings of fruits and vegetables daily.He consumes 1 sweetened beverage(s) daily.He exercises with enough effort to increase his heart rate 20 to 29 minutes per day.  He exercises with enough effort to increase his heart rate 3 or less days per week.   He is taking medications regularly.         Concern for COVID-19  About how many days ago did these symptoms start? 3  Is this your first visit for this illness? Yes  In the 14 days before your symptoms started, have you had close contact with someone with COVID-19 (Coronavirus)? No  Do you have a fever or chills? No  Are you having new or worsening difficulty breathing? No  Do you have new or worsening cough? Yes, it's a dry cough.   Have you had any new or unexplained body aches? No    Have you experienced any of the following NEW symptoms?    Headache: No    Sore throat: YES    Loss of taste or smell: No    Chest pain: No    Diarrhea: No    Rash: No  What treatments have you tried? On 3x's week prophylactic abx  Who do you live with? Wife,  Mother in law  Are you, or a household member, a healthcare worker or a ? No  Do you live in a nursing home, group home, or shelter? No  Do you have a way to get food/medications if quarantined? Yes, I have a friend or family member who can help me.    He reports symptoms started a scratchy throat approximately 3 days ago.  This is progressed into cough, sinus congestion and drainage as well as a runny nose.  He denies any fevers.  He has not been around anyone has been sick although he did recently visit his children and granddaughter.  He is not using any over-the-counter medications for symptomatic " management.  He does take prophylactic antibiotics for times a week as well as immunosuppression due to status post lung transplant.  He is to follow-up with his transplant team next week due to declining lung function noted on recent testing.  He has been immunized for COVID-19 x3, has not had any blood work to determine his immunity status given his immunosuppression.      Review of Systems   Constitutional: Negative.    HENT: Positive for congestion, rhinorrhea and sore throat.    Eyes: Negative.    Respiratory: Positive for cough. Negative for shortness of breath and wheezing.    Cardiovascular: Negative.    Gastrointestinal: Negative.             Objective    There were no vitals taken for this visit.  There is no height or weight on file to calculate BMI.  Physical Exam  Constitutional:       Appearance: Normal appearance.   HENT:      Head: Normocephalic and atraumatic.      Right Ear: Tympanic membrane and ear canal normal.      Left Ear: Tympanic membrane and ear canal normal.      Nose: Nose normal.      Mouth/Throat:      Mouth: Mucous membranes are moist.      Pharynx: Oropharynx is clear.   Eyes:      Conjunctiva/sclera: Conjunctivae normal.   Cardiovascular:      Rate and Rhythm: Normal rate and regular rhythm.   Pulmonary:      Effort: Pulmonary effort is normal. No respiratory distress.      Breath sounds: Normal breath sounds. No wheezing.   Musculoskeletal:      Cervical back: Normal range of motion and neck supple.   Lymphadenopathy:      Cervical: No cervical adenopathy.   Neurological:      Mental Status: He is alert.        Multiplex swab obtained

## 2022-02-03 NOTE — TELEPHONE ENCOUNTER
"Coronavirus (COVID-19) Notification    Caller Name (Patient, parent, daughter/son, grandparent, etc)  Aubrey     Reason for call  Notify of Positive Coronavirus (COVID-19) lab results, assess symptoms,  review [facility] recommendations    Lab Result    Lab test:  2019-nCoV rRt-PCR or SARS-CoV-2 PCR    Oropharyngeal AND/OR nasopharyngeal swabs is POSITIVE for 2019-nCoV RNA/SARS-COV-2 PCR (COVID-19 virus)    RN Recommendations/Instructions per [facility] Coronavirus COVID-19 recommendations    Brief introduction script  Introduce self then review script:  \"I am calling on behalf of [facility].  We were notified that your Coronavirus test (COVID-19) for was POSITIVE for the virus.  I have some information to relay to you but first I wanted to mention that the [place] Dept of Health will be contacting you shortly [it's possible MD already called Patient] to talk to you more about how you are feeling and other people you have had contact with who might now also have the virus.  Also,  [employer] is Partnering with the [facility] for Covid-19 research, you may be contacted directly by research staff.\"      Assessment (Inquire about Patient's current symptoms)   Assessment   Current Symptoms at time of phone call: (if no symptoms, document No symptoms] Yes    Date of symptom(s) onset (if applicable) 1/27/22     If at time of call, Patients symptoms hare worsened, the Patient should contact 911 or have someone drive them to Emergency Dept promptly:      If Patient calling 911, inform 911 personal that you have tested positive for the Coronavirus (COVID-19).  Place mask on and await 911 to arrive.    If Emergency Dept, If possible, please have another adult drive you to the Emergency Dept but you need to wear mask when in contact with other people.          Treatment Options:   Patient classified as COVID treatment eligible by Epic high risk algorithm: No  You may be eligible to receive a new " treatment with a monoclonal antibody for preventing hospitalization in patients at high risk for complications from COVID-19.  This medication is still experimental and available on a limited basis; it is given through an IV and must be given at an infusion center.  Please note that not all people who are eligible will receive the medication since it is in limited supply.  Are you interested in being considered for this medication?  No.     Review information with Patient    Your result was positive. This means you have COVID-19 (coronavirus).  We have sent you a letter that reviews the information that I'll be reviewing with you now.    How can I protect others?    If you have symptoms: stay home and away from others (self-isolate) until:    You've had no fever--and no medicine that reduces fever--for 1 full day (24 hours). And       Your other symptoms have gotten better. For example, your cough or breathing has improved. And     At least 10 days have passed since your symptoms started. (If you've been told by a doctor that you have a weak immune system, wait 20 days.)     If you don't have symptoms: Stay home and away from others (self-isolate) until at least 10 days have passed since your first positive COVID-19 test. (Date test collected)    During this time:    Stay in your own room, including for meals. Use your own bathroom if you can.    Stay away from others in your home. No hugging, kissing or shaking hands. No visitors.     Don't go to work, school or anywhere else.     Clean  high touch  surfaces often (doorknobs, counters, handles, etc.). Use a household cleaning spray or wipes. You'll find a full list on the EPA website at www.epa.gov/pesticide-registration/list-n-disinfectants-use-against-sars-cov-2.     Cover your mouth and nose with a mask, tissue or other face covering to avoid spreading germs.    Wash your hands and face often with soap and water.    Make a list of people you have been in close  contact with recently, even if either of you wore a face covering.   - Start your list from 2 days before you became ill or had a positive test.  - Include anyone that was within 6 feet of you for a cumulative total of 15 minutes or more in 24 hours. (Example: if you sat next to Vladislav for 5 minutes in the morning and 10 minutes in the afternoon, then you were in close contact for 15 minutes total that day. Vladislav would be added to your list.)    A public health worker will call or text you. It is important that you answer. They will ask you questions about possible exposures to COVID-19, such as people you have been in direct contact with and places you have visited.    Tell the people on your list that you have COVID-19; they should stay away from others for 14 days starting from the last time they were in contact with you (unless you are told something different from a public health worker).     Caregivers in these groups are at risk for severe illness due to COVID-19:  o People 65 years and older  o People who live in a nursing home or long-term care facility  o People with chronic disease (lung, heart, cancer, diabetes, kidney, liver, immunologic)  o People who have a weakened immune system, including those who:  - Are in cancer treatment  - Take medicine that weakens the immune system, such as corticosteroids  - Had a bone marrow or organ transplant  - Have an immune deficiency  - Have poorly controlled HIV or AIDS  - Are obese (body mass index of 40 or higher)  - Smoke regularly    Caregivers should wear gloves while washing dishes, handling laundry and cleaning bedrooms and bathrooms.    Wash and dry laundry with special caution. Don't shake dirty laundry, and use the warmest water setting you can.    If you have a weakened immune system, ask your doctor about other actions you should take.    For more tips, go to www.cdc.gov/coronavirus/2019-ncov/downloads/10Things.pdf.    You should not go back to work until  you meet the guidelines above for ending your home isolation. You don't need to be retested for COVID-19 before going back to work--studies show that you won't spread the virus if it's been at least 10 days since your symptoms started (or 20 days, if you have a weak immune system).    Employers: This document serves as formal notice of your employee's medical guidelines for going back to work. They must meet the above guidelines before going back to work in person.    How can I take care of myself?    1. Get lots of rest. Drink extra fluids (unless a doctor has told you not to).    2. Take Tylenol (acetaminophen) for fever or pain. If you have liver or kidney problems, ask your family doctor if it's okay to take Tylenol.     Take either:     650 mg (two 325 mg pills) every 4 to 6 hours, or     1,000 mg (two 500 mg pills) every 8 hours as needed.     Note: Don't take more than 3,000 mg in one day. Acetaminophen is found in many medicines (both prescribed and over-the-counter medicines). Read all labels to be sure you don't take too much.    For children, check the Tylenol bottle for the right dose (based on their age or weight).    3. If you have other health problems (like cancer, heart failure, an organ transplant or severe kidney disease): Call your specialty clinic if you don't feel better in the next 2 days.    4. Know when to call 911: Emergency warning signs include:    Trouble breathing or shortness of breath    Pain or pressure in the chest that doesn't go away    Feeling confused like you haven't felt before, or not being able to wake up    Bluish-colored lips or face    5. Sign up for [employer]. We know it's scary to hear that you have COVID-19. We want to track your symptoms to make sure you're okay over the next 2 weeks. Please look for an email from [employer]--this is a free, online program that we'll use to keep in touch. To sign up, follow the link in the email. Learn more at  [email or web address].    Where can I get more information?    [facility]: [email or web address]    Coronavirus Basics: www.health.[email or web address]/diseases/coronavirus/basics.html    What to Do If You're Sick: www.cdc.gov/coronavirus/2019-ncov/about/steps-when-sick.html    Ending Home Isolation: www.cdc.gov/coronavirus/2019-ncov/hcp/disposition-in-home-patients.html     Caring for Someone with COVID-19: www.cdc.gov/coronavirus/2019-ncov/if-you-are-sick/care-for-someone.html     [facility] clinical trials (COVID-19 research studies): [email or web address]-[email or web address]-[email or web address]     A Positive COVID-19 letter will be sent via [employer] or the mail. (Exception, no letters sent to Presurgerical/Preprocedure Patients)    Nikole Penny

## 2022-02-03 NOTE — NURSING NOTE
"Patient presents to the clinic for concerns about a head cold due to post nasal drip, sore throat, head congestion and productive cough with clear sputum for the past week.    FOOD SECURITY SCREENING QUESTIONS:    The next two questions are to help us understand your food security.  If you are feeling you need any assistance in this area, we have resources available to support you today.    Hunger Vital Signs:  Within the past 12 months we worried whether our food would run out before we got money to buy more. Never  Within the past 12 months the food we bought just didn't last and we didn't have money to get more. Never    Advance Care Directive on file? yes  Advance Care Directive provided to patient? Declined.  Chief Complaint   Patient presents with     Throat Problem       Initial /70 (BP Location: Right arm, Patient Position: Sitting, Cuff Size: Adult Large)   Pulse 64   Temp 97.9  F (36.6  C) (Tympanic)   Resp 20   Wt 75.3 kg (166 lb)   SpO2 97%   BMI 26.00 kg/m   Estimated body mass index is 26 kg/m  as calculated from the following:    Height as of 2/1/22: 1.702 m (5' 7\").    Weight as of this encounter: 75.3 kg (166 lb).  Medication Reconciliation: complete        Vanda Yates LPN       "
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
## 2022-02-10 ENCOUNTER — VIRTUAL VISIT (OUTPATIENT)
Dept: TRANSPLANT | Facility: CLINIC | Age: 67
End: 2022-02-10
Attending: INTERNAL MEDICINE
Payer: MEDICARE

## 2022-02-10 DIAGNOSIS — Z94.2 S/P LUNG TRANSPLANT (H): Primary | ICD-10-CM

## 2022-02-10 PROCEDURE — 99215 OFFICE O/P EST HI 40 MIN: CPT | Mod: 95 | Performed by: INTERNAL MEDICINE

## 2022-02-10 PROCEDURE — G0463 HOSPITAL OUTPT CLINIC VISIT: HCPCS | Mod: PN,RTG | Performed by: INTERNAL MEDICINE

## 2022-02-10 NOTE — NURSING NOTE
Transplant Coordinator Note    Reason for visit: Post lung transplant follow up visit   Coordinator: Present (Luz via video)  Caregiver:  Pt's wife    Health concerns addressed today:  1. COVID positive 2/3/22 - sinus drainage; reaction to monoclonal antibodies; no increased shortness of breath  2. Blood sugars 70s-130s/140s  3. Staying active - goes for walks     Activity/rehab: Up ad aviva  Oxygen needs: Room air  Pain management/RX: NA  Diabetic management: Managed by PCP  PJP prophylactic: bactrim    COVID:  1. COVID-19 infection (yes/no, date of most recent positive test): yes; 2/3/22  2. Status/instructions given about COVID-19 vaccine: Pfizer, 2/24/21; 3/24/21; 9/2/21    Pt Education: medications (use/dose/side effects), how/when to call coordinator, frequency of labs, s/s of infection/rejection, call prior to starting any new medications, lab/vital sign book    Health Maintenance:     Last colonoscopy:     Next colonoscopy due:     Dermatology: 2/1/22    Vaccinations this visit:     Labs, CXR, PFTs reviewed with patient  Medication record reviewed and reconciled  Questions and concerns addressed    Patient Instructions  1. Continue to hydrate with 60-70 oz fluids daily.  2. Continue to exercise daily or most days of the week.  3. Follow up with your primary care provider for annual gender health maintenance procedures.  4. Follow up with colonoscopy schedule.  5. Follow up with annual dermatology visits.  6. It doesn't seem like the COVID vaccine is working well in lung transplant patients. A number of lung transplant patients have gotten sick with COVID even after receiving the vaccines.  Based on our recent experience, it can be life-threatening to get COVID  even after being vaccinated. Please continue to act like you did not get the COVID vaccine - social distancing, wearing a mask, good hand hygiene, etc. If the people around you are vaccinated, it will help reduce the risk of you getting COVID. All  members of your household should be vaccinated.  7. Try to use the sinus rinses more often to help with your nasal drainage.  Use distilled water and sanitize after each use.   8. Restart taking azathioprine on March 2nd.   9. Get PFTs locally by March 3rd.    Next transplant clinic appointment:  3 months with CXR, labs and PFTs  Next lab draw: every 6 weeks      AVS printed at time of check out

## 2022-02-10 NOTE — PROGRESS NOTES
Aubrey is a 66 year old who is being evaluated via a billable video visit.      How would you like to obtain your AVS? MyChart  If the video visit is dropped, the invitation should be resent by: Text to cell phone: [phone]  Will anyone else be joining your video visit? No      Video Start Time: 11:52 AM  Video-Visit Details    Type of service:  Video Visit    Video End Time:12:18 PM    Originating Location (pt. Location): Home    Distant Location (provider location):  [facility]     Platform used for Video Visit: Damion Choi CMA
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
## 2022-02-10 NOTE — PATIENT INSTRUCTIONS
Patient Instructions  1. Continue to hydrate with 60-70 oz fluids daily.  2. Continue to exercise daily or most days of the week.  3. Follow up with your primary care provider for annual gender health maintenance procedures.  4. Follow up with colonoscopy schedule.  5. Follow up with annual dermatology visits.  6. It doesn't seem like the COVID vaccine is working well in lung transplant patients. A number of lung transplant patients have gotten sick with COVID even after receiving the vaccines.  Based on our recent experience, it can be life-threatening to get COVID  even after being vaccinated. Please continue to act like you did not get the COVID vaccine - social distancing, wearing a mask, good hand hygiene, etc. If the people around you are vaccinated, it will help reduce the risk of you getting COVID. All members of your household should be vaccinated.  7. Try to use the sinus rinses more often to help with your nasal drainage.  Use distilled water and sanitize after each use.   8. Restart taking azathioprine on March 2nd.   9. Get PFTs locally by March 3rd.    Next transplant clinic appointment:  3 months with CXR, labs and PFTs  Next lab draw: every 6 weeks    ~~~~~~~~~~~~~~~~~~~~~~~~~    Thoracic Transplant Office phone [phone], fax [phone]    Office Hours 8:30 - 5:00     For after-hours urgent issues, please dial [phone], and ask to speak with the Thoracic Transplant Coordinator On-Call.  --------------------  To expedite your medication refill(s), please contact your pharmacy and have them fax a refill request to: [phone]  .   *Please allow 3 business days for routine medication refills.  *Please allow 5 business days for controlled substance medication refills.    **For Diabetic medications and supplies refill(s), please contact your pharmacy and have them contact your Endocrine team.  --------------------  For scheduling appointments call [phone].  --------------------  Please  Note: If you are active on [employer], all future test results will be sent by [employer] message only, and will no longer be called to patient. You may also receive communication directly from your physician.

## 2022-02-11 DIAGNOSIS — Z94.2 LUNG REPLACED BY TRANSPLANT (H): Primary | ICD-10-CM

## 2022-02-11 NOTE — PROGRESS NOTES
Plan for follow up appointment in transplant clinic in May 2022.  Orders in place.  Message sent to schedulers.    Plan for pt to have repeat PFTs when back to baseline and before 03/03/2022 in order to continue to see if pt qualifies for photopheresis study.  Order in place.  Plan for two negative COVID tests prior to PFTs.

## 2022-02-15 ENCOUNTER — TELEPHONE (OUTPATIENT)
Dept: TRANSPLANT | Facility: CLINIC | Age: 67
End: 2022-02-15
Payer: MEDICARE

## 2022-02-15 NOTE — LETTER
PHYSICIAN ORDERS      DATE & TIME ISSUED: February 15, 2022 12:21 PM  PATIENT NAME: Aubrey Duncan   : 1955     [facility] MR# [if applicable]: [id number]     DIAGNOSIS:  Long Term use of medications  Z79.899, Lung Transplant  Z94.2 and Complications of Lung Transplant T86.819  Patient needs COVID PCR via NP testing weekly x 2 (Patient needs 2 negative tests 1 week apart: prior to getting PFTs).        Any questions please call: [phone]    Please fax these results to [phone].         Alma Murphy MD  Pulmonary Disease

## 2022-02-15 NOTE — LETTER
PHYSICIAN ORDERS      DATE & TIME ISSUED: February 15, 2022 12:21 PM  PATIENT NAME: Aubrey Duncan   : 1955     [facility] MR# [if applicable]: [id number]     DIAGNOSIS:  Long Term use of medications  Z79.899, Lung Transplant  Z94.2 and Complications of Lung Transplant T86.819  Patient needs COVID PCR via NP testing weekly x 2 (Patient needs 2 negative tests 1 week apart: prior to getting PFTs).        Any questions please call: [phone]    Please fax these results to [phone].         Alma Murphy MD  Pulmonary Disease

## 2022-02-17 ENCOUNTER — TELEPHONE (OUTPATIENT)
Dept: TRANSPLANT | Facility: CLINIC | Age: 67
End: 2022-02-17
Payer: MEDICARE

## 2022-02-17 DIAGNOSIS — Z94.2 LUNG REPLACED BY TRANSPLANT (H): Primary | ICD-10-CM

## 2022-02-17 DIAGNOSIS — Z79.899 ENCOUNTER FOR LONG-TERM (CURRENT) USE OF HIGH-RISK MEDICATION: ICD-10-CM

## 2022-02-17 PROBLEM — K21.9 GASTROESOPHAGEAL REFLUX DISEASE: Status: ACTIVE | Noted: 2018-06-14

## 2022-02-17 NOTE — TELEPHONE ENCOUNTER
"Patient diagnosed with Covid on 2-3-22. Spoke with patient today and he reports feeling fine\", still has persistent tickle in back of throat with occasional dry cough. Denies fevers or any change in his respiratory status nor any new symptoms.  He has set up PFTs locally on 2-22-22 and is getting Covid tests as well.  "

## 2022-02-25 ENCOUNTER — TELEPHONE (OUTPATIENT)
Dept: FAMILY MEDICINE | Facility: OTHER | Age: 67
End: 2022-02-25
Payer: MEDICARE

## 2022-02-25 NOTE — TELEPHONE ENCOUNTER
Patient dropped off forms for completion for his [employer] patient assistance program application. Forms were completed and faxed. Records are scanned and he was given the originals.BRANDI Manning CNP on 2/25/2022 at 12:22 PM

## 2022-03-01 ENCOUNTER — OFFICE VISIT (OUTPATIENT)
Dept: FAMILY MEDICINE | Facility: OTHER | Age: 67
End: 2022-03-01
Attending: NURSE PRACTITIONER
Payer: MEDICARE

## 2022-03-01 VITALS
SYSTOLIC BLOOD PRESSURE: 130 MMHG | DIASTOLIC BLOOD PRESSURE: 70 MMHG | HEART RATE: 80 BPM | OXYGEN SATURATION: 99 % | RESPIRATION RATE: 16 BRPM | WEIGHT: 165 LBS | TEMPERATURE: 98.1 F | BODY MASS INDEX: 25.84 KG/M2

## 2022-03-01 DIAGNOSIS — J22 LOWER RESPIRATORY INFECTION (E.G., BRONCHITIS, PNEUMONIA, PNEUMONITIS, PULMONITIS): Primary | ICD-10-CM

## 2022-03-01 PROCEDURE — 99213 OFFICE O/P EST LOW 20 MIN: CPT | Performed by: NURSE PRACTITIONER

## 2022-03-01 PROCEDURE — G0463 HOSPITAL OUTPT CLINIC VISIT: HCPCS

## 2022-03-01 RX ORDER — AZITHROMYCIN 250 MG/1
TABLET, FILM COATED ORAL
Qty: 6 TABLET | Refills: 0 | Status: SHIPPED | OUTPATIENT
Start: 2022-03-01 | End: 2022-03-06

## 2022-03-01 ASSESSMENT — PAIN SCALES - GENERAL: PAINLEVEL: NO PAIN (0)

## 2022-03-01 NOTE — NURSING NOTE
"Chief Complaint   Patient presents with     Ear Problem     chest ear, congestion, cough, phlegm is yellow       Initial /70   Pulse 80   Temp 98.1  F (36.7  C)   Resp 16   Wt 74.8 kg (165 lb)   SpO2 99%   BMI 25.84 kg/m   Estimated body mass index is 25.84 kg/m  as calculated from the following:    Height as of 2/1/22: 1.702 m (5' 7\").    Weight as of this encounter: 74.8 kg (165 lb).  Medication Reconciliation: complete.  FOOD SECURITY SCREENING QUESTIONS  Hunger Vital Signs:  Within the past 12 months we worried whether our food would run out before we got money to buy more. Never  Within the past 12 months the food we bought just didn't last and we didn't have money to get more. Never  Cristal Looney LPN 3/1/2022 10:31 AM      Cristal Looney LPN  "

## 2022-03-09 ENCOUNTER — MYC MEDICAL ADVICE (OUTPATIENT)
Dept: FAMILY MEDICINE | Facility: OTHER | Age: 67
End: 2022-03-09
Payer: MEDICARE

## 2022-03-11 ENCOUNTER — ALLIED HEALTH/NURSE VISIT (OUTPATIENT)
Dept: FAMILY MEDICINE | Facility: OTHER | Age: 67
End: 2022-03-11
Attending: INTERNAL MEDICINE
Payer: MEDICARE

## 2022-03-11 DIAGNOSIS — Z20.822 COVID-19 RULED OUT: Primary | ICD-10-CM

## 2022-03-11 DIAGNOSIS — Z94.2 LUNG REPLACED BY TRANSPLANT (H): ICD-10-CM

## 2022-03-11 PROCEDURE — U0003 INFECTIOUS AGENT DETECTION BY NUCLEIC ACID (DNA OR RNA); SEVERE ACUTE RESPIRATORY SYNDROME CORONAVIRUS 2 (SARS-COV-2) (CORONAVIRUS DISEASE [COVID-19]), AMPLIFIED PROBE TECHNIQUE, MAKING USE OF HIGH THROUGHPUT TECHNOLOGIES AS DESCRIBED BY CMS-2020-01-R: HCPCS | Mod: ZL

## 2022-03-11 PROCEDURE — C9803 HOPD COVID-19 SPEC COLLECT: HCPCS

## 2022-03-11 NOTE — PROGRESS NOTES
Patient here for Covid Testing. Pre op pft 3/22/22 [facility], needs 2 negative tests before his pft.    Madie Lion MA on 3/11/2022 at 10:24 AM

## 2022-03-12 LAB — SARS-COV-2 RNA RESP QL NAA+PROBE: NEGATIVE

## 2022-03-12 NOTE — TELEPHONE ENCOUNTER
Gypsy from Lung Transplant Program is going to fax us form that Hoa DON-SWETHA will complete and we will fax it to program.   Jaleesa Chaparro LPN...................1/31/2020  11:35 AM   No

## 2022-03-18 ENCOUNTER — ALLIED HEALTH/NURSE VISIT (OUTPATIENT)
Dept: FAMILY MEDICINE | Facility: OTHER | Age: 67
End: 2022-03-18
Attending: INTERNAL MEDICINE
Payer: MEDICARE

## 2022-03-18 DIAGNOSIS — Z20.822 COVID-19 RULED OUT: Primary | ICD-10-CM

## 2022-03-18 DIAGNOSIS — Z94.2 LUNG REPLACED BY TRANSPLANT (H): ICD-10-CM

## 2022-03-18 PROCEDURE — C9803 HOPD COVID-19 SPEC COLLECT: HCPCS

## 2022-03-18 PROCEDURE — U0003 INFECTIOUS AGENT DETECTION BY NUCLEIC ACID (DNA OR RNA); SEVERE ACUTE RESPIRATORY SYNDROME CORONAVIRUS 2 (SARS-COV-2) (CORONAVIRUS DISEASE [COVID-19]), AMPLIFIED PROBE TECHNIQUE, MAKING USE OF HIGH THROUGHPUT TECHNOLOGIES AS DESCRIBED BY CMS-2020-01-R: HCPCS | Mod: ZL

## 2022-03-18 NOTE — PROGRESS NOTES
Patient here for Covid Testing. Pre op spirometry, 3/22/22 [facility].    Madie Lion MA on 3/18/2022 at 9:49 AM

## 2022-03-19 LAB — SARS-COV-2 RNA RESP QL NAA+PROBE: NEGATIVE

## 2022-03-22 ENCOUNTER — LAB (OUTPATIENT)
Dept: LAB | Facility: OTHER | Age: 67
End: 2022-03-22
Attending: INTERNAL MEDICINE
Payer: MEDICARE

## 2022-03-22 ENCOUNTER — TELEPHONE (OUTPATIENT)
Dept: TRANSPLANT | Facility: CLINIC | Age: 67
End: 2022-03-22
Payer: MEDICARE

## 2022-03-22 ENCOUNTER — HOSPITAL ENCOUNTER (OUTPATIENT)
Dept: RESPIRATORY THERAPY | Facility: OTHER | Age: 67
Discharge: HOME | End: 2022-03-22
Attending: INTERNAL MEDICINE
Payer: MEDICARE

## 2022-03-22 DIAGNOSIS — Z94.2 LUNG REPLACED BY TRANSPLANT (H): ICD-10-CM

## 2022-03-22 DIAGNOSIS — Z94.2 S/P LUNG TRANSPLANT (H): ICD-10-CM

## 2022-03-22 DIAGNOSIS — Z94.2 TRANSPLANTED, LUNG (H): ICD-10-CM

## 2022-03-22 DIAGNOSIS — T86.819 COMPLICATION OF LUNG TRANSPLANT (H): Primary | ICD-10-CM

## 2022-03-22 DIAGNOSIS — Z79.899 LONG TERM USE OF DRUG: ICD-10-CM

## 2022-03-22 LAB
ANION GAP SERPL CALCULATED.3IONS-SCNC: 8 MMOL/L (ref 3–14)
BUN SERPL-MCNC: 39 MG/DL (ref 7–25)
CALCIUM SERPL-MCNC: 9.4 MG/DL (ref 8.6–10.3)
CHLORIDE BLD-SCNC: 109 MMOL/L (ref 98–107)
CO2 SERPL-SCNC: 22 MMOL/L (ref 21–31)
CREAT SERPL-MCNC: 1.34 MG/DL (ref 0.7–1.3)
ERYTHROCYTE [DISTWIDTH] IN BLOOD BY AUTOMATED COUNT: 13.4 % (ref 10–15)
GFR SERPL CREATININE-BSD FRML MDRD: 58 ML/MIN/1.73M2
GLUCOSE BLD-MCNC: 104 MG/DL (ref 70–105)
HCT VFR BLD AUTO: 41.3 % (ref 40–53)
HGB BLD-MCNC: 13.5 G/DL (ref 13.3–17.7)
MAGNESIUM SERPL-MCNC: 1.7 MG/DL (ref 1.9–2.7)
MCH RBC QN AUTO: 34.1 PG (ref 26.5–33)
MCHC RBC AUTO-ENTMCNC: 32.7 G/DL (ref 31.5–36.5)
MCV RBC AUTO: 104 FL (ref 78–100)
PLATELET # BLD AUTO: 185 10E3/UL (ref 150–450)
POTASSIUM BLD-SCNC: 4.7 MMOL/L (ref 3.5–5.1)
RBC # BLD AUTO: 3.96 10E6/UL (ref 4.4–5.9)
SODIUM SERPL-SCNC: 139 MMOL/L (ref 134–144)
WBC # BLD AUTO: 8.3 10E3/UL (ref 4–11)

## 2022-03-22 PROCEDURE — 85014 HEMATOCRIT: CPT | Mod: ZL

## 2022-03-22 PROCEDURE — 999N000157 HC STATISTIC RCP TIME EA 10 MIN

## 2022-03-22 PROCEDURE — 82310 ASSAY OF CALCIUM: CPT | Mod: ZL

## 2022-03-22 PROCEDURE — 94010 BREATHING CAPACITY TEST: CPT | Mod: 26 | Performed by: INTERNAL MEDICINE

## 2022-03-22 PROCEDURE — 94010 BREATHING CAPACITY TEST: CPT

## 2022-03-22 PROCEDURE — 83735 ASSAY OF MAGNESIUM: CPT | Mod: ZL

## 2022-03-22 PROCEDURE — 36415 COLL VENOUS BLD VENIPUNCTURE: CPT | Mod: ZL

## 2022-03-22 NOTE — TELEPHONE ENCOUNTER
Checking in with patient post COVID from February to see how patient is feeling? Patient states he feels a lot better, got a sinus infection one month after covid. States his breathing is good and sats are good. Stopped Xarelto and restarted Imuran in beginning of March.    Patient has had two negative covid tests 1 week apart so needs to have local PFTs to make sure lung function is okay. Patient states he is getting some done today at local clinic. Patient will get lab work done as well, faxed orders over to [facility].

## 2022-03-22 NOTE — LETTER
PHYSICIAN ORDERS      DATE & TIME ISSUED: 2022 9:52 AM  PATIENT NAME: Aubrey Duncan   : 1955     [facility] MR# [if applicable]: [id number]     DIAGNOSIS:  Long Term use of medications  Z79.899, Lung Transplant  Z94.2 and Complications of Lung Transplant T86.819  Please check the following labs today 3/22/22:    Basic Metabolic Panel  Magnesium  CBC with platelets  Tacrolimus level  CMV Quantitative PCR (Blood)  EBV DNA PCR Quantitative Whole Blood      Any questions please call: [phone]    Please fax these results to [phone].         Alma Murphy MD  Pulmonary Disease

## 2022-03-23 LAB — CMV DNA SPEC NAA+PROBE-ACNC: NOT DETECTED IU/ML

## 2022-03-24 DIAGNOSIS — Z94.2 LUNG REPLACED BY TRANSPLANT (H): ICD-10-CM

## 2022-03-24 DIAGNOSIS — Z79.899 ENCOUNTER FOR LONG-TERM (CURRENT) USE OF HIGH-RISK MEDICATION: ICD-10-CM

## 2022-03-30 ENCOUNTER — LAB (OUTPATIENT)
Dept: LAB | Facility: OTHER | Age: 67
End: 2022-03-30
Attending: INTERNAL MEDICINE
Payer: MEDICARE

## 2022-03-30 DIAGNOSIS — Z79.899 LONG TERM USE OF DRUG: ICD-10-CM

## 2022-03-30 DIAGNOSIS — Z94.2 LUNG REPLACED BY TRANSPLANT (H): ICD-10-CM

## 2022-03-30 DIAGNOSIS — Z79.899 ENCOUNTER FOR LONG-TERM (CURRENT) USE OF HIGH-RISK MEDICATION: ICD-10-CM

## 2022-03-30 DIAGNOSIS — T86.819 COMPLICATION OF LUNG TRANSPLANT (H): ICD-10-CM

## 2022-03-30 DIAGNOSIS — Z94.2 TRANSPLANTED, LUNG (H): ICD-10-CM

## 2022-03-30 LAB
ANION GAP SERPL CALCULATED.3IONS-SCNC: 6 MMOL/L (ref 3–14)
BUN SERPL-MCNC: 36 MG/DL (ref 7–25)
CALCIUM SERPL-MCNC: 9.2 MG/DL (ref 8.6–10.3)
CHLORIDE BLD-SCNC: 112 MMOL/L (ref 98–107)
CO2 SERPL-SCNC: 27 MMOL/L (ref 21–31)
CREAT SERPL-MCNC: 1.23 MG/DL (ref 0.7–1.3)
ERYTHROCYTE [DISTWIDTH] IN BLOOD BY AUTOMATED COUNT: 13.4 % (ref 10–15)
GFR SERPL CREATININE-BSD FRML MDRD: 64 ML/MIN/1.73M2
GLUCOSE BLD-MCNC: 74 MG/DL (ref 70–105)
HCT VFR BLD AUTO: 39.7 % (ref 40–53)
HGB BLD-MCNC: 12.7 G/DL (ref 13.3–17.7)
MAGNESIUM SERPL-MCNC: 1.6 MG/DL (ref 1.9–2.7)
MCH RBC QN AUTO: 33.8 PG (ref 26.5–33)
MCHC RBC AUTO-ENTMCNC: 32 G/DL (ref 31.5–36.5)
MCV RBC AUTO: 106 FL (ref 78–100)
PLATELET # BLD AUTO: 173 10E3/UL (ref 150–450)
POTASSIUM BLD-SCNC: 4.8 MMOL/L (ref 3.5–5.1)
RBC # BLD AUTO: 3.76 10E6/UL (ref 4.4–5.9)
SODIUM SERPL-SCNC: 145 MMOL/L (ref 134–144)
WBC # BLD AUTO: 6.8 10E3/UL (ref 4–11)

## 2022-03-30 PROCEDURE — 80048 BASIC METABOLIC PNL TOTAL CA: CPT | Mod: ZL

## 2022-03-30 PROCEDURE — 36415 COLL VENOUS BLD VENIPUNCTURE: CPT | Mod: ZL

## 2022-03-30 PROCEDURE — 85027 COMPLETE CBC AUTOMATED: CPT | Mod: ZL

## 2022-03-30 PROCEDURE — 83735 ASSAY OF MAGNESIUM: CPT | Mod: ZL

## 2022-03-30 PROCEDURE — 87799 DETECT AGENT NOS DNA QUANT: CPT | Mod: ZL

## 2022-03-30 PROCEDURE — 80197 ASSAY OF TACROLIMUS: CPT | Mod: ZL

## 2022-03-31 ENCOUNTER — TELEPHONE (OUTPATIENT)
Dept: TRANSPLANT | Facility: CLINIC | Age: 67
End: 2022-03-31
Payer: MEDICARE

## 2022-03-31 DIAGNOSIS — Z94.2 S/P LUNG TRANSPLANT (H): Chronic | ICD-10-CM

## 2022-03-31 DIAGNOSIS — Z94.2 S/P LUNG TRANSPLANT (H): Primary | ICD-10-CM

## 2022-03-31 LAB
CMV DNA SPEC NAA+PROBE-ACNC: NOT DETECTED IU/ML
EBV DNA COPIES/ML, INSTRUMENT: 3073 COPIES/ML
EBV DNA SPEC NAA+PROBE-LOG#: 3.5 {LOG_COPIES}/ML
TACROLIMUS BLD-MCNC: 10.6 UG/L (ref 5–15)
TME LAST DOSE: NORMAL H
TME LAST DOSE: NORMAL H

## 2022-03-31 RX ORDER — TACROLIMUS 1 MG/1
2 CAPSULE ORAL 2 TIMES DAILY
Qty: 120 CAPSULE | Refills: 11 | Status: SHIPPED | OUTPATIENT
Start: 2022-03-31 | End: 2022-06-21

## 2022-03-31 RX ORDER — TACROLIMUS 0.5 MG/1
0.5 CAPSULE ORAL EVERY MORNING
Qty: 30 CAPSULE | Refills: 11 | Status: SHIPPED | OUTPATIENT
Start: 2022-03-31 | End: 2022-06-21

## 2022-03-31 NOTE — TELEPHONE ENCOUNTER
Tacrolimus level 10.6 at 12 hours, on 3-30-22.  Goal 8-10.   Current dose 2.5 mg in AM, 2.5 mg in PM    Dose changed to 2.5 mg in AM, 2.0 mg in PM   Recheck level in 7-10 days    Discussed with patient   MyChart message sent     Patient inquires if he can obtain his lst Covid 19 vaccination and also [facility] Pt Diagnosed with Covid on 1-26-22 and also had monoclonal antibodies on 2-4-22. Will review with Dr. Murphy for guidance.

## 2022-04-05 ENCOUNTER — MYC MEDICAL ADVICE (OUTPATIENT)
Dept: FAMILY MEDICINE | Facility: OTHER | Age: 67
End: 2022-04-05
Payer: MEDICARE

## 2022-04-05 ENCOUNTER — TELEPHONE (OUTPATIENT)
Dept: TRANSPLANT | Facility: CLINIC | Age: 67
End: 2022-04-05
Payer: MEDICARE

## 2022-04-05 NOTE — TELEPHONE ENCOUNTER
Aubrey had questions about when he could recieve the Evusheld shot, based on the following;     Vaccinations on  9/2/2021, 3/24/2021, 2/24/2021    Diagnosed with Covid on 1-26-22  Recieved monoclonal antibodies on 2-4-22.   He will get his 2nd COVID vaccination booster this week.     Aubrey can receive Evushold at the time of his next pul txp appt on 5/26. Aubrey is aware and agrees with this plan.

## 2022-04-11 ENCOUNTER — LAB (OUTPATIENT)
Dept: LAB | Facility: OTHER | Age: 67
End: 2022-04-11
Payer: MEDICARE

## 2022-04-11 DIAGNOSIS — Z94.2 LUNG REPLACED BY TRANSPLANT (H): ICD-10-CM

## 2022-04-11 LAB
ANION GAP SERPL CALCULATED.3IONS-SCNC: 7 MMOL/L (ref 3–14)
BUN SERPL-MCNC: 35 MG/DL (ref 7–25)
CALCIUM SERPL-MCNC: 9.1 MG/DL (ref 8.6–10.3)
CHLORIDE BLD-SCNC: 109 MMOL/L (ref 98–107)
CO2 SERPL-SCNC: 26 MMOL/L (ref 21–31)
CREAT SERPL-MCNC: 1.29 MG/DL (ref 0.7–1.3)
ERYTHROCYTE [DISTWIDTH] IN BLOOD BY AUTOMATED COUNT: 13.5 % (ref 10–15)
GFR SERPL CREATININE-BSD FRML MDRD: 60 ML/MIN/1.73M2
GLUCOSE BLD-MCNC: 82 MG/DL (ref 70–105)
HCT VFR BLD AUTO: 39.5 % (ref 40–53)
HGB BLD-MCNC: 12.7 G/DL (ref 13.3–17.7)
MAGNESIUM SERPL-MCNC: 1.7 MG/DL (ref 1.9–2.7)
MCH RBC QN AUTO: 33.7 PG (ref 26.5–33)
MCHC RBC AUTO-ENTMCNC: 32.2 G/DL (ref 31.5–36.5)
MCV RBC AUTO: 105 FL (ref 78–100)
PLATELET # BLD AUTO: 160 10E3/UL (ref 150–450)
POTASSIUM BLD-SCNC: 5.3 MMOL/L (ref 3.5–5.1)
RBC # BLD AUTO: 3.77 10E6/UL (ref 4.4–5.9)
SODIUM SERPL-SCNC: 142 MMOL/L (ref 134–144)
WBC # BLD AUTO: 7.2 10E3/UL (ref 4–11)

## 2022-04-11 PROCEDURE — 82310 ASSAY OF CALCIUM: CPT | Mod: ZL

## 2022-04-11 PROCEDURE — 36415 COLL VENOUS BLD VENIPUNCTURE: CPT | Mod: ZL

## 2022-04-11 PROCEDURE — 83735 ASSAY OF MAGNESIUM: CPT | Mod: ZL

## 2022-04-11 PROCEDURE — 80197 ASSAY OF TACROLIMUS: CPT | Mod: ZL

## 2022-04-11 PROCEDURE — 85027 COMPLETE CBC AUTOMATED: CPT | Mod: ZL

## 2022-04-12 LAB
CMV DNA SPEC NAA+PROBE-ACNC: NOT DETECTED IU/ML
TACROLIMUS BLD-MCNC: 8.6 UG/L (ref 5–15)
TME LAST DOSE: NORMAL H
TME LAST DOSE: NORMAL H

## 2022-04-12 NOTE — RESULT ENCOUNTER NOTE
Tacrolimus level 8.6 at 12 hours, on 4/11/22.  Goal 8-10.   Current dose 2.5 mg in AM, 2 mg in PM    No dose change at goal    [employer] message sent

## 2022-05-06 DIAGNOSIS — Z94.2 LUNG TRANSPLANT STATUS, BILATERAL (H): ICD-10-CM

## 2022-05-09 NOTE — TELEPHONE ENCOUNTER
[facility] sent Rx request for the following:      Requested Prescriptions   Pending Prescriptions Disp Refills     omeprazole (PRILOSEC) 20 MG DR capsule 180 capsule 3     Sig: Take 1 capsule (20 mg) by mouth 2 times daily (before meals)   Last Prescription Date:   4/12/21  Last Fill Qty/Refills:         180, R-3    Last Office Visit:              3/1/22   Future Office visit:           None    Brooke Nicholas RN .............. 5/9/2022  2:30 PM

## 2022-05-10 NOTE — RESULT ENCOUNTER NOTE
Admitted to [facility] on 2/24/19. Specialty Care (Immediate)...
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
## 2022-05-26 ENCOUNTER — OFFICE VISIT (OUTPATIENT)
Dept: TRANSPLANT | Facility: CLINIC | Age: 67
End: 2022-05-26
Attending: INTERNAL MEDICINE
Payer: MEDICARE

## 2022-05-26 ENCOUNTER — LAB (OUTPATIENT)
Dept: LAB | Facility: CLINIC | Age: 67
End: 2022-05-26
Attending: INTERNAL MEDICINE
Payer: MEDICARE

## 2022-05-26 ENCOUNTER — ANCILLARY PROCEDURE (OUTPATIENT)
Dept: GENERAL RADIOLOGY | Facility: CLINIC | Age: 67
End: 2022-05-26
Attending: INTERNAL MEDICINE
Payer: MEDICARE

## 2022-05-26 VITALS
DIASTOLIC BLOOD PRESSURE: 69 MMHG | OXYGEN SATURATION: 98 % | SYSTOLIC BLOOD PRESSURE: 121 MMHG | BODY MASS INDEX: 25.22 KG/M2 | WEIGHT: 161 LBS | HEART RATE: 85 BPM

## 2022-05-26 DIAGNOSIS — Z94.2 LUNG REPLACED BY TRANSPLANT (H): ICD-10-CM

## 2022-05-26 DIAGNOSIS — Z94.2 S/P LUNG TRANSPLANT (H): ICD-10-CM

## 2022-05-26 DIAGNOSIS — Z79.899 ENCOUNTER FOR LONG-TERM (CURRENT) USE OF HIGH-RISK MEDICATION: ICD-10-CM

## 2022-05-26 DIAGNOSIS — Z94.2 LUNG REPLACED BY TRANSPLANT (H): Primary | ICD-10-CM

## 2022-05-26 DIAGNOSIS — Z79.899 ENCOUNTER FOR LONG-TERM (CURRENT) USE OF HIGH-RISK MEDICATION: Primary | ICD-10-CM

## 2022-05-26 DIAGNOSIS — Z79.52 LONG TERM (CURRENT) USE OF SYSTEMIC STEROIDS: ICD-10-CM

## 2022-05-26 LAB
ALBUMIN SERPL-MCNC: 3.4 G/DL (ref 3.4–5)
ALP SERPL-CCNC: 60 U/L (ref 40–150)
ALT SERPL W P-5'-P-CCNC: 86 U/L (ref 0–70)
ANION GAP SERPL CALCULATED.3IONS-SCNC: 5 MMOL/L (ref 3–14)
AST SERPL W P-5'-P-CCNC: 58 U/L (ref 0–45)
BILIRUB SERPL-MCNC: 0.5 MG/DL (ref 0.2–1.3)
BUN SERPL-MCNC: 29 MG/DL (ref 7–30)
CALCIUM SERPL-MCNC: 9.1 MG/DL (ref 8.5–10.1)
CHLORIDE BLD-SCNC: 109 MMOL/L (ref 94–109)
CMV DNA SPEC NAA+PROBE-ACNC: NOT DETECTED IU/ML
CMV DNA SPEC NAA+PROBE-ACNC: NOT DETECTED IU/ML
CO2 SERPL-SCNC: 27 MMOL/L (ref 20–32)
CREAT SERPL-MCNC: 1.14 MG/DL (ref 0.66–1.25)
ERYTHROCYTE [DISTWIDTH] IN BLOOD BY AUTOMATED COUNT: 13.2 % (ref 10–15)
EXPTIME-PRE: 8.39 SEC
FEF2575-%PRED-PRE: 111 %
FEF2575-PRE: 2.7 L/SEC
FEF2575-PRED: 2.43 L/SEC
FEFMAX-%PRED-PRE: 114 %
FEFMAX-PRE: 9.27 L/SEC
FEFMAX-PRED: 8.11 L/SEC
FEV1-%PRED-PRE: 101 %
FEV1-PRE: 3.12 L
FEV1FEV6-PRE: 78 %
FEV1FEV6-PRED: 78 %
FEV1FVC-PRE: 78 %
FEV1FVC-PRED: 77 %
FIFMAX-PRE: 7.04 L/SEC
FVC-%PRED-PRE: 99 %
FVC-PRE: 4.02 L
FVC-PRED: 4.04 L
GFR SERPL CREATININE-BSD FRML MDRD: 70 ML/MIN/1.73M2
GLUCOSE BLD-MCNC: 90 MG/DL (ref 70–99)
HCT VFR BLD AUTO: 42 % (ref 40–53)
HGB BLD-MCNC: 13.1 G/DL (ref 13.3–17.7)
IGG SERPL-MCNC: 718 MG/DL (ref 610–1616)
MAGNESIUM SERPL-MCNC: 1.9 MG/DL (ref 1.6–2.3)
MCH RBC QN AUTO: 33.2 PG (ref 26.5–33)
MCHC RBC AUTO-ENTMCNC: 31.2 G/DL (ref 31.5–36.5)
MCV RBC AUTO: 107 FL (ref 78–100)
PLATELET # BLD AUTO: 193 10E3/UL (ref 150–450)
POTASSIUM BLD-SCNC: 4.6 MMOL/L (ref 3.4–5.3)
PROT SERPL-MCNC: 6.8 G/DL (ref 6.8–8.8)
RBC # BLD AUTO: 3.94 10E6/UL (ref 4.4–5.9)
SODIUM SERPL-SCNC: 141 MMOL/L (ref 133–144)
TACROLIMUS BLD-MCNC: 8 UG/L (ref 5–15)
TME LAST DOSE: NORMAL H
TME LAST DOSE: NORMAL H
WBC # BLD AUTO: 6.7 10E3/UL (ref 4–11)

## 2022-05-26 PROCEDURE — 83735 ASSAY OF MAGNESIUM: CPT | Performed by: PATHOLOGY

## 2022-05-26 PROCEDURE — M0220 HC INJECTION TIXAGEVIMAB & CILGAVIMAB (EVUSHELD): HCPCS | Performed by: INTERNAL MEDICINE

## 2022-05-26 PROCEDURE — 36415 COLL VENOUS BLD VENIPUNCTURE: CPT | Performed by: PATHOLOGY

## 2022-05-26 PROCEDURE — 250N000011 HC RX IP 250 OP 636: Performed by: INTERNAL MEDICINE

## 2022-05-26 PROCEDURE — 71046 X-RAY EXAM CHEST 2 VIEWS: CPT | Mod: GC | Performed by: RADIOLOGY

## 2022-05-26 PROCEDURE — 80197 ASSAY OF TACROLIMUS: CPT | Performed by: INTERNAL MEDICINE

## 2022-05-26 PROCEDURE — 99215 OFFICE O/P EST HI 40 MIN: CPT | Mod: 25 | Performed by: INTERNAL MEDICINE

## 2022-05-26 PROCEDURE — 87799 DETECT AGENT NOS DNA QUANT: CPT | Performed by: INTERNAL MEDICINE

## 2022-05-26 PROCEDURE — 80053 COMPREHEN METABOLIC PANEL: CPT | Performed by: PATHOLOGY

## 2022-05-26 PROCEDURE — 96372 THER/PROPH/DIAG INJ SC/IM: CPT | Performed by: INTERNAL MEDICINE

## 2022-05-26 PROCEDURE — G0463 HOSPITAL OUTPT CLINIC VISIT: HCPCS | Performed by: INTERNAL MEDICINE

## 2022-05-26 PROCEDURE — 94375 RESPIRATORY FLOW VOLUME LOOP: CPT | Performed by: INTERNAL MEDICINE

## 2022-05-26 PROCEDURE — 85027 COMPLETE CBC AUTOMATED: CPT | Performed by: PATHOLOGY

## 2022-05-26 PROCEDURE — 82784 ASSAY IGA/IGD/IGG/IGM EACH: CPT | Performed by: INTERNAL MEDICINE

## 2022-05-26 RX ADMIN — AZD7442 6 ML: KIT at 11:53

## 2022-05-26 NOTE — PATIENT INSTRUCTIONS
Patient Instructions  1. Continue to hydrate with 60-70 oz fluids daily.  2. Continue to exercise daily or most days of the week.  3. Follow up with your primary care provider for annual gender health maintenance procedures.  4. Follow up with colonoscopy schedule.  5. Follow up with annual dermatology visits.  6. It doesn't seem like the COVID vaccine is working well in lung transplant patients. A number of lung transplant patients have gotten sick with COVID even after receiving the vaccines.  Based on our recent experience, it can be life-threatening to get COVID  even after being vaccinated. Please continue to act like you did not get the COVID vaccine - social distancing, wearing a mask, good hand hygiene, etc. If the people around you are vaccinated, it will help reduce the risk of you getting COVID. All members of your household should be vaccinated.  7. Try using Flonase nasal spray or the sinus rinses to help with the sinus drainage.  8. Check your blood pressure twice a day (morning and evening).  9. Increase your lisinopril to 10 mg daily.  Take your lisinopril in the morning.    Next transplant clinic appointment:  6 months with CXR, labs, dexa scan and full PFTs  Next lab draw: two weeks    ~~~~~~~~~~~~~~~~~~~~~~~~~    Thoracic Transplant Office phone [phone], fax [phone]    Office Hours 8:30 - 5:00     For after-hours urgent issues, please dial [phone], and ask to speak with the Thoracic Transplant Coordinator On-Call.  --------------------  To expedite your medication refill(s), please contact your pharmacy and have them fax a refill request to: [phone]  .   *Please allow 3 business days for routine medication refills.  *Please allow 5 business days for controlled substance medication refills.    **For Diabetic medications and supplies refill(s), please contact your pharmacy and have them contact your Endocrine team.  --------------------  For scheduling appointments call  [phone].  --------------------  Please Note: If you are active on [employer], all future test results will be sent by [employer] message only, and will no longer be called to patient. You may also receive communication directly from your physician.

## 2022-05-26 NOTE — RESULT ENCOUNTER NOTE
Tacrolimus level 8.0 at 12 hours, on 5/26/22.  Goal 8-10.   Current dose 2.5 mg in AM, 2 mg in PM    Level at goal.  No dose change.    [employer] message sent

## 2022-05-26 NOTE — NURSING NOTE
Chief Complaint   Patient presents with     RECHECK     Lung tx follow up      Blood pressure 121/69, pulse 85, weight 73 kg (161 lb), SpO2 98 %.    Kenia Choi, CMA
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
## 2022-05-26 NOTE — NURSING NOTE
EVUSHELD Administration Note:  Aubrey Duncan presents today for EVUSHELD.   present during visit today: Not Applicable.    Consent:   Informed Consent confirmed in chart, obtained on this date: 5/26/22    Post Injection Assessment:   Patient observed for 60 minutes post administration per protocol.      Patient was observed in the room for a minimum of 10 minutes after injection per standard, then remained in the buidling for a total 60 minute observation period.         Discharge Plan:    Patient and/or family verbalized understanding of discharge instructions and all questions answered.     Kenia Choi, CMA

## 2022-05-26 NOTE — NURSING NOTE
Transplant Coordinator Note    Reason for visit: Post lung transplant follow up visit   Coordinator: Present (Luz via telephone)  Caregiver:  Pt's wife    Health concerns addressed today:  1. BPs 150s-180s/80s-90s in AM  2. GI illness end of March/beginning of April  3. Respiratory: minimal cough and sinus drainage  4. GI/: takes imodium for diarrhea  5. Neuropathy in feet    Activity/rehab: Up ad aviva  Oxygen needs: Room air  Pain management/RX: NA  Diabetic management: Managed by PCP  PJP prophylactic: bactrim     COVID:  1. COVID-19 infection (yes/no, date of most recent positive test): yes; 2/3/22  2. Status/instructions given about COVID-19 vaccine: Pfizer, 2/24/21; 3/24/21; 9/2/21    Pt Education: medications (use/dose/side effects), how/when to call coordinator, frequency of labs, s/s of infection/rejection, call prior to starting any new medications, lab/vital sign book    Health Maintenance:     Last colonoscopy:     Next colonoscopy due:     Dermatology: 2/1/2022    Vaccinations this visit: Mooeld    Labs, CXR, PFTs reviewed with patient  Medication record reviewed and reconciled  Questions and concerns addressed    Patient Instructions  1. Continue to hydrate with 60-70 oz fluids daily.  2. Continue to exercise daily or most days of the week.  3. Follow up with your primary care provider for annual gender health maintenance procedures.  4. Follow up with colonoscopy schedule.  5. Follow up with annual dermatology visits.  6. It doesn't seem like the COVID vaccine is working well in lung transplant patients. A number of lung transplant patients have gotten sick with COVID even after receiving the vaccines.  Based on our recent experience, it can be life-threatening to get COVID  even after being vaccinated. Please continue to act like you did not get the COVID vaccine - social distancing, wearing a mask, good hand hygiene, etc. If the people around you are vaccinated, it will help reduce the risk of  you getting COVID. All members of your household should be vaccinated.  7. Try using Flonase nasal spray or the sinus rinses to help with the sinus drainage.  8. Check your blood pressure twice a day (morning and evening).  9. Increase your lisinopril to 10 mg daily.  Take your lisinopril in the morning.    Next transplant clinic appointment:  6 months with CXR, labs, dexa scan and full PFTs  Next lab draw: two weeks      AVS printed at time of check out

## 2022-05-27 LAB
EBV DNA COPIES/ML, INSTRUMENT: 7484 COPIES/ML
EBV DNA SPEC NAA+PROBE-LOG#: 3.9 {LOG_COPIES}/ML

## 2022-06-02 ENCOUNTER — MYC MEDICAL ADVICE (OUTPATIENT)
Dept: FAMILY MEDICINE | Facility: OTHER | Age: 67
End: 2022-06-02
Payer: MEDICARE

## 2022-06-02 DIAGNOSIS — I10 ESSENTIAL HYPERTENSION: ICD-10-CM

## 2022-06-02 DIAGNOSIS — Z20.818 STREP THROAT EXPOSURE: ICD-10-CM

## 2022-06-02 DIAGNOSIS — J02.9 SORE THROAT: Primary | ICD-10-CM

## 2022-06-02 RX ORDER — AMOXICILLIN 500 MG/1
500 CAPSULE ORAL 2 TIMES DAILY
Qty: 20 CAPSULE | Refills: 0 | Status: SHIPPED | OUTPATIENT
Start: 2022-06-02 | End: 2022-06-12

## 2022-06-02 RX ORDER — LISINOPRIL 10 MG/1
10 TABLET ORAL DAILY
Qty: 90 TABLET | Refills: 3 | Status: SHIPPED | OUTPATIENT
Start: 2022-06-02 | End: 2022-06-23

## 2022-06-02 NOTE — TELEPHONE ENCOUNTER
Patient reported a dosage change:    Lisinopril 5mg  Taking 10mg daily     Please verify and send new order. Thank you

## 2022-06-10 ENCOUNTER — TELEPHONE (OUTPATIENT)
Dept: TRANSPLANT | Facility: CLINIC | Age: 67
End: 2022-06-10
Payer: MEDICARE

## 2022-06-10 DIAGNOSIS — Z94.2 LUNG REPLACED BY TRANSPLANT (H): Primary | ICD-10-CM

## 2022-06-10 NOTE — TELEPHONE ENCOUNTER
Patient Call: General  Route to LPN    Reason for call: Patient called and needs orders put in for PFT test. At [facility]    Call back needed? Yes    Return Call Needed  Same as documented in contacts section  When to return call?: Same day: Route High Priority

## 2022-06-11 DIAGNOSIS — E11.9 DIABETES MELLITUS TYPE 2, DIET-CONTROLLED (H): ICD-10-CM

## 2022-06-13 ENCOUNTER — LAB (OUTPATIENT)
Dept: LAB | Facility: OTHER | Age: 67
End: 2022-06-13
Attending: NURSE PRACTITIONER
Payer: MEDICARE

## 2022-06-13 DIAGNOSIS — Z94.2 LUNG REPLACED BY TRANSPLANT (H): ICD-10-CM

## 2022-06-13 LAB
ANION GAP SERPL CALCULATED.3IONS-SCNC: 5 MMOL/L (ref 3–14)
BUN SERPL-MCNC: 38 MG/DL (ref 7–25)
CALCIUM SERPL-MCNC: 8.8 MG/DL (ref 8.6–10.3)
CHLORIDE BLD-SCNC: 108 MMOL/L (ref 98–107)
CO2 SERPL-SCNC: 27 MMOL/L (ref 21–31)
CREAT SERPL-MCNC: 1.42 MG/DL (ref 0.7–1.3)
ERYTHROCYTE [DISTWIDTH] IN BLOOD BY AUTOMATED COUNT: 13.2 % (ref 10–15)
GFR SERPL CREATININE-BSD FRML MDRD: 54 ML/MIN/1.73M2
GLUCOSE BLD-MCNC: 91 MG/DL (ref 70–105)
HCT VFR BLD AUTO: 39.1 % (ref 40–53)
HGB BLD-MCNC: 12.5 G/DL (ref 13.3–17.7)
MAGNESIUM SERPL-MCNC: 1.9 MG/DL (ref 1.9–2.7)
MCH RBC QN AUTO: 33.5 PG (ref 26.5–33)
MCHC RBC AUTO-ENTMCNC: 32 G/DL (ref 31.5–36.5)
MCV RBC AUTO: 105 FL (ref 78–100)
PLATELET # BLD AUTO: 203 10E3/UL (ref 150–450)
POTASSIUM BLD-SCNC: 5.4 MMOL/L (ref 3.5–5.1)
RBC # BLD AUTO: 3.73 10E6/UL (ref 4.4–5.9)
SODIUM SERPL-SCNC: 140 MMOL/L (ref 134–144)
TACROLIMUS BLD-MCNC: 9.3 UG/L (ref 5–15)
TME LAST DOSE: NORMAL H
TME LAST DOSE: NORMAL H
WBC # BLD AUTO: 7.1 10E3/UL (ref 4–11)

## 2022-06-13 PROCEDURE — 83735 ASSAY OF MAGNESIUM: CPT | Mod: ZL

## 2022-06-13 PROCEDURE — 85027 COMPLETE CBC AUTOMATED: CPT | Mod: ZL

## 2022-06-13 PROCEDURE — 80197 ASSAY OF TACROLIMUS: CPT | Mod: ZL

## 2022-06-13 PROCEDURE — 80048 BASIC METABOLIC PNL TOTAL CA: CPT | Mod: ZL

## 2022-06-13 PROCEDURE — 36415 COLL VENOUS BLD VENIPUNCTURE: CPT | Mod: ZL

## 2022-06-13 NOTE — TELEPHONE ENCOUNTER
Routing refill request to provider for review/approval because:    LOV; 3/1/22      Lenore Walsh RN on 6/13/2022 at 2:12 PM

## 2022-06-14 ENCOUNTER — MYC MEDICAL ADVICE (OUTPATIENT)
Dept: FAMILY MEDICINE | Facility: OTHER | Age: 67
End: 2022-06-14
Payer: MEDICARE

## 2022-06-14 DIAGNOSIS — E11.9 DIABETES MELLITUS TYPE 2, DIET-CONTROLLED (H): ICD-10-CM

## 2022-06-14 LAB — CMV DNA SPEC NAA+PROBE-ACNC: NOT DETECTED IU/ML

## 2022-06-14 RX ORDER — ROSUVASTATIN CALCIUM 40 MG/1
TABLET, COATED ORAL
Qty: 90 TABLET | Refills: 1 | Status: SHIPPED | OUTPATIENT
Start: 2022-06-14 | End: 2022-06-14

## 2022-06-14 RX ORDER — ROSUVASTATIN CALCIUM 40 MG/1
TABLET, COATED ORAL
Qty: 90 TABLET | Refills: 3 | Status: SHIPPED | OUTPATIENT
Start: 2022-06-14 | End: 2023-09-19

## 2022-06-14 NOTE — RESULT ENCOUNTER NOTE
Tacrolimus level 9.3 at 11.25 hours, on 6/13/22.  Goal 8-10.   Current dose 2.5 mg in AM, 2 mg in PM    Level at goal.  No dose change.    Creatinine 1.42 on 6/13/22.  Encouraged pt to hydrate with at least 70 oz of fluid daily.    Cotera message sent

## 2022-06-17 ENCOUNTER — TELEPHONE (OUTPATIENT)
Dept: FAMILY MEDICINE | Facility: OTHER | Age: 67
End: 2022-06-17
Payer: MEDICARE

## 2022-06-17 NOTE — TELEPHONE ENCOUNTER
Call and let patient know that tSiven is in our inpatient pharmacy.  He is out of town until Monday.  They will return to town next week and stop in the clinic to pick this up. BRANDI Manning CNP on 6/17/2022 at 9:13 AM

## 2022-06-20 ENCOUNTER — TELEPHONE (OUTPATIENT)
Dept: TRANSPLANT | Facility: CLINIC | Age: 67
End: 2022-06-20
Payer: MEDICARE

## 2022-06-20 NOTE — TELEPHONE ENCOUNTER
Spoke with Gwen SMITH regarding the patient's admission to  regarding N/V, SHELLEY, and heat exposure.  Plan for repeat tacrolimus level in AM 6/21.  Confirmed current dose tacrolimus 2.5 mg in AM and 2.0 mg in PM.  Dr. Reaves paged to discuss further plan of care discussion, including plan for possible stress dose steroids.

## 2022-06-20 NOTE — TELEPHONE ENCOUNTER
[facility] called to touch base with the RNCC regarding the dosage on the Prograf.

## 2022-06-21 ENCOUNTER — TELEPHONE (OUTPATIENT)
Dept: TRANSPLANT | Facility: CLINIC | Age: 67
End: 2022-06-21
Payer: MEDICARE

## 2022-06-21 ENCOUNTER — TELEPHONE (OUTPATIENT)
Dept: FAMILY MEDICINE | Facility: OTHER | Age: 67
End: 2022-06-21
Payer: MEDICARE

## 2022-06-21 ENCOUNTER — MYC MEDICAL ADVICE (OUTPATIENT)
Dept: FAMILY MEDICINE | Facility: OTHER | Age: 67
End: 2022-06-21
Payer: MEDICARE

## 2022-06-21 DIAGNOSIS — Z94.2 S/P LUNG TRANSPLANT (H): Chronic | ICD-10-CM

## 2022-06-21 RX ORDER — TACROLIMUS 0.5 MG/1
0.5 CAPSULE ORAL EVERY EVENING
Qty: 30 CAPSULE | Refills: 11 | COMMUNITY
Start: 2022-06-21 | End: 2023-05-26

## 2022-06-21 RX ORDER — TACROLIMUS 1 MG/1
2 CAPSULE ORAL 2 TIMES DAILY
Qty: 120 CAPSULE | Refills: 11 | COMMUNITY
Start: 2022-06-21 | End: 2023-05-09

## 2022-06-21 NOTE — TELEPHONE ENCOUNTER
Return call placed to Jaclyn at inpatient hospital.  She reports she did talk to his transplant team and have medications adjusted.  With his current insulin dose, he has been running low blood sugars.  He was admitted for nausea, vomiting, dehydration.  His blood glucose was 54 after Lantus and NovoLog.  He was running 112 after eating.  They plan to reduce his Lantus from 23-15 and 1 to make sure that this was okay with me.  Also he had some mild changes to kidney functions and potassium and are requesting to hold his lisinopril.  They had this dosing at 5 mg daily, our records say 10 mg daily.  I am in agreement of reducing the Lantus and holding the lisinopril.  He will be following up on Thursday in clinic for labs per his transplant team and a follow-up with myself from hospitalization.  They are planning on discharging him today.   BRANDI Manning CNP on 6/21/2022 at 3:58 PM

## 2022-06-21 NOTE — TELEPHONE ENCOUNTER
Called Jaclyn HERNANDEZ from [facility] and she has requested that DMO call her as she would to discuss plan of care and medications with DMO. Did explain that I would forward this message onto provider.  Isatu Rahman LPN ....................  6/21/2022   12:14 PM

## 2022-06-21 NOTE — TELEPHONE ENCOUNTER
Spoke with Jaclyn at [facility] who updated plan of care. Prograf dose decreased per Dr. Reaves to 1.5mg BID, level to be drawn tomorrow morning. Per Jaclyn WBC decreased from 6 to 3.2, wondering if Imuran dose needs to be adjusted and if pt can be discharged. Page sent to Dr. Reaves to discuss Imuran dosing with Jaclyn.

## 2022-06-21 NOTE — TELEPHONE ENCOUNTER
Appointment scheduled with DMO for Thursday. Patient notifiied  Isatu Rahman LPN ....................  6/21/2022   2:57 PM

## 2022-06-21 NOTE — TELEPHONE ENCOUNTER
[facility] called would like to speak with a provider no details given.

## 2022-06-21 NOTE — TELEPHONE ENCOUNTER
[facility] for hosp discharge and follow up plan. Thank you.  Danielle Rain

## 2022-06-23 ENCOUNTER — OFFICE VISIT (OUTPATIENT)
Dept: FAMILY MEDICINE | Facility: OTHER | Age: 67
End: 2022-06-23
Attending: NURSE PRACTITIONER
Payer: MEDICARE

## 2022-06-23 ENCOUNTER — TELEPHONE (OUTPATIENT)
Dept: TRANSPLANT | Facility: CLINIC | Age: 67
End: 2022-06-23

## 2022-06-23 VITALS
DIASTOLIC BLOOD PRESSURE: 72 MMHG | BODY MASS INDEX: 25.91 KG/M2 | TEMPERATURE: 98.2 F | OXYGEN SATURATION: 97 % | SYSTOLIC BLOOD PRESSURE: 136 MMHG | RESPIRATION RATE: 18 BRPM | HEART RATE: 91 BPM | WEIGHT: 165.4 LBS

## 2022-06-23 DIAGNOSIS — N18.30 ACUTE RENAL FAILURE SUPERIMPOSED ON STAGE 3 CHRONIC KIDNEY DISEASE, UNSPECIFIED ACUTE RENAL FAILURE TYPE, UNSPECIFIED WHETHER STAGE 3A OR 3B CKD (H): ICD-10-CM

## 2022-06-23 DIAGNOSIS — I10 ESSENTIAL HYPERTENSION: ICD-10-CM

## 2022-06-23 DIAGNOSIS — N17.9 ACUTE RENAL FAILURE SUPERIMPOSED ON STAGE 3 CHRONIC KIDNEY DISEASE, UNSPECIFIED ACUTE RENAL FAILURE TYPE, UNSPECIFIED WHETHER STAGE 3A OR 3B CKD (H): ICD-10-CM

## 2022-06-23 DIAGNOSIS — Z94.2 S/P LUNG TRANSPLANT (H): Chronic | ICD-10-CM

## 2022-06-23 DIAGNOSIS — E88.01 ALPHA-1-ANTITRYPSIN DEFICIENCY (H): ICD-10-CM

## 2022-06-23 DIAGNOSIS — R11.0 NAUSEA: Primary | ICD-10-CM

## 2022-06-23 DIAGNOSIS — E11.42 TYPE 2 DIABETES MELLITUS WITH DIABETIC POLYNEUROPATHY, WITH LONG-TERM CURRENT USE OF INSULIN (H): ICD-10-CM

## 2022-06-23 DIAGNOSIS — Z79.4 TYPE 2 DIABETES MELLITUS WITH DIABETIC POLYNEUROPATHY, WITH LONG-TERM CURRENT USE OF INSULIN (H): ICD-10-CM

## 2022-06-23 PROCEDURE — 99214 OFFICE O/P EST MOD 30 MIN: CPT | Performed by: NURSE PRACTITIONER

## 2022-06-23 PROCEDURE — G0463 HOSPITAL OUTPT CLINIC VISIT: HCPCS

## 2022-06-23 RX ORDER — TACROLIMUS 0.5 MG/1
1.5 CAPSULE ORAL
COMMUNITY
Start: 2022-06-21 | End: 2022-06-24

## 2022-06-23 RX ORDER — ONDANSETRON 4 MG/1
4 TABLET, FILM COATED ORAL EVERY 6 HOURS PRN
Qty: 30 TABLET | Refills: 1 | Status: SHIPPED | OUTPATIENT
Start: 2022-06-23 | End: 2024-01-09

## 2022-06-23 RX ORDER — OMEPRAZOLE 20 MG/1
20 TABLET, DELAYED RELEASE ORAL
COMMUNITY
End: 2022-10-11

## 2022-06-23 RX ORDER — LISINOPRIL 5 MG/1
5 TABLET ORAL DAILY
Qty: 90 TABLET | Refills: 3 | Status: SHIPPED | OUTPATIENT
Start: 2022-06-23 | End: 2022-07-01

## 2022-06-23 ASSESSMENT — PAIN SCALES - GENERAL: PAINLEVEL: MODERATE PAIN (4)

## 2022-06-23 NOTE — NURSING NOTE
"Chief Complaint   Patient presents with     Hospital F/U     Low blood sugar, heat exhaustion, flu         Initial /72 (BP Location: Right arm, Patient Position: Sitting, Cuff Size: Adult Regular)   Pulse 91   Temp 98.2  F (36.8  C) (Tympanic)   Resp 18   Wt 75 kg (165 lb 6.4 oz)   SpO2 97%   BMI 25.91 kg/m   Estimated body mass index is 25.91 kg/m  as calculated from the following:    Height as of 2/1/22: 1.702 m (5' 7\").    Weight as of this encounter: 75 kg (165 lb 6.4 oz).       Medication Reconciliation: Complete    Arcelia Walker LPN .......  6/23/2022  1:50 PM   "

## 2022-06-23 NOTE — TELEPHONE ENCOUNTER
"Received phone call from pt, stating that he has not adjusted his tacrolimus dose and is currently taking 2.5 mg in the AM and 2.0 mg in the PM.  Pt had tacrolimus level checked 6/22.  Plan to adjust dose based on result.      Pt states that he's \"feeling much better\" from his recent hospital admission for N/V and dehydration.  Pt has follow up with his PCP this afternoon regarding blood sugar and blood pressure management.  "

## 2022-06-23 NOTE — PROGRESS NOTES
Assessment & Plan   Problem List Items Addressed This Visit        Nervous and Auditory    Type 2 diabetes mellitus with diabetic polyneuropathy, with long-term current use of insulin (H)       Respiratory    Alpha-1-antitrypsin deficiency (H) (Chronic)       Circulatory    Essential hypertension (Chronic)    Relevant Medications    lisinopril (ZESTRIL) 5 MG tablet       Urinary    Acute renal failure superimposed on stage 3 chronic kidney disease, unspecified acute renal failure type, unspecified whether stage 3a or 3b CKD (H)    Relevant Medications    lisinopril (ZESTRIL) 5 MG tablet       Other    S/P lung transplant (H) (Chronic)      Other Visit Diagnoses     Nausea    -  Primary    Relevant Medications    ondansetron (ZOFRAN) 4 MG tablet      He is showing improvement in overall symptoms after hospital discharge.  Blood sugars are getting under better control.  He will continue with Lantus 15 units daily, NovoLog per previous order.  We discussed that his blood sugar goal would be good between 80 and 160.  He does not need to keep supertight control as he is aging as low blood sugars increases risk of falls and secondary complications.  We will continue to monitor blood sugars and if they start to climb, he can increase his Lantus and notify me of this.    Potassium is normal, kidney functions are stabilizing.  Blood pressures under good control, lisinopril will be restarted at 5 mg daily for kidney protection related to his diabetes.    He is having some nausea, request Zofran.  This was ordered for him today.    He will continue to work with his transplant team regarding other adjustments of medications and any other follow-up with recent labs that they had ordered.    Review of external notes as documented elsewhere in note  Prescription drug management  42 minutes spent on the date of the encounter doing chart review, history and exam, documentation and further activities per the note       BMI:  "  Estimated body mass index is 25.91 kg/m  as calculated from the following:    Height as of 2/1/22: 1.702 m (5' 7\").    Weight as of this encounter: 75 kg (165 lb 6.4 oz).     Blood sugar testing frequency justification:  Adjustment of medication(s)      No follow-ups on file.    BRANDI Manning CNP  [facility]    Subjective   Aubrey is a 66 year old accompanied by his wife., presenting for the following health issues:  Hospital F/U (Low blood sugar, heat exhaustion, flu)      History of Present Illness       Diabetes:   He presents for follow up of diabetes.  He is checking home blood glucose three times daily. He checks blood glucose before meals and at bedtime.  Blood glucose is never over 200 and sometimes under 70. He is aware of hypoglycemia symptoms including shakiness and lethargy. He is concerned about other.  He is having numbness in feet and burning in feet. The patient has not had a diabetic eye exam in the last 12 months.         Hypertension: He presents for follow up of hypertension.  He does check blood pressure  regularly outside of the clinic. Outside blood pressures have been over 140/90. He does not follow a low salt diet.           Hospital Follow-up Visit:    Hospital/Nursing Home/IP Rehab Facility: [place], [place]  Date of Admission: 6/20/22  Date of Discharge: 6/21/22  Reason(s) for Admission: Low blood sugar, heat exhaustion, flu    Was your hospitalization related to COVID-19? No   Problems taking medications regularly:  None  Medication changes since discharge: None  Problems adhering to non-medication therapy:  None    Summary of hospitalization:  CareEverywhere information obtained and reviewed  Diagnostic Tests/Treatments reviewed.  Follow up needed: none  Other Healthcare Providers Involved in Patient s Care:         Transplant team  Update since discharge: improved. Post Discharge Medication Reconciliation: discharge medications reconciled and changed, " per note/orders.  Plan of care communicated with patient     He was visiting his daughter in [place], working in the fields in the hot sun.  Reports having low blood sugars, progressed to nausea and vomiting and essentially passed out.  He was taken to the emergency room and admitted to the hospital for nausea, vomiting and diarrhea, heat exposure, acute kidney injury.  During the course of his hospital stay he was having low blood sugars and his Lantus was decreased to 15 units daily.  His last hemoglobin A1c was below 6.  His home blood sugar readings have been in the 80s to 150s with an occasional 160.  He tends to keep his blood sugar quite tight.  He does feel symptomatic if his blood sugars are in the 70s or lower, does have occasional low blood sugars at home as well.  Due to his potassium being elevated and alterations in his kidney functions, lisinopril was held while he was in the hospital.  Blood pressure has remained stable.  He did have blood work yesterday per his transplant team and his kidney functions have improved almost to baseline.  Transplant team has his Imuran on hold due to kidney functions.  He is waiting to hear back from them to see when he will restart this.  Overall he reports he is feeling much better since he was discharged home.    Review of Systems   See above      Objective    /72 (BP Location: Right arm, Patient Position: Sitting, Cuff Size: Adult Regular)   Pulse 91   Temp 98.2  F (36.8  C) (Tympanic)   Resp 18   Wt 75 kg (165 lb 6.4 oz)   SpO2 97%   BMI 25.91 kg/m    Body mass index is 25.91 kg/m .  Physical Exam   GENERAL: healthy, alert and no distress  EYES: Eyes grossly normal to inspection  RESP: lungs clear to auscultation - no rales, rhonchi or wheezes  CV: regular rate and rhythm, normal S1 S2, no S3 or S4  NEURO: Normal strength and tone, mentation intact and speech normal  PSYCH: mentation appears normal, affect normal/bright    Reviewed external labs from  [facility] that were completed yesterday.                .  ..

## 2022-06-24 ENCOUNTER — TELEPHONE (OUTPATIENT)
Dept: TRANSPLANT | Facility: CLINIC | Age: 67
End: 2022-06-24

## 2022-06-24 DIAGNOSIS — Z94.2 LUNG REPLACED BY TRANSPLANT (H): Primary | ICD-10-CM

## 2022-06-24 NOTE — TELEPHONE ENCOUNTER
Tacrolimus level 14.7 at 12 hours, on 6/22/22.  Goal 8-10.   Current dose 2.5 mg in AM, 2.0 mg in PM    Dose changed to 1.5 mg in AM, 1.5 mg in PM   Recheck level in 7-10 days.    Discussed with patient.

## 2022-06-30 ENCOUNTER — MYC MEDICAL ADVICE (OUTPATIENT)
Dept: FAMILY MEDICINE | Facility: OTHER | Age: 67
End: 2022-06-30

## 2022-06-30 DIAGNOSIS — I10 ESSENTIAL HYPERTENSION: ICD-10-CM

## 2022-06-30 RX ORDER — METOPROLOL TARTRATE 25 MG/1
25 TABLET, FILM COATED ORAL 2 TIMES DAILY
Qty: 180 TABLET | Refills: 2 | Status: CANCELLED | OUTPATIENT
Start: 2022-06-30

## 2022-07-01 ENCOUNTER — OFFICE VISIT (OUTPATIENT)
Dept: FAMILY MEDICINE | Facility: OTHER | Age: 67
End: 2022-07-01
Attending: NURSE PRACTITIONER
Payer: MEDICARE

## 2022-07-01 VITALS
SYSTOLIC BLOOD PRESSURE: 160 MMHG | WEIGHT: 160.2 LBS | TEMPERATURE: 97.4 F | OXYGEN SATURATION: 98 % | BODY MASS INDEX: 25.09 KG/M2 | DIASTOLIC BLOOD PRESSURE: 100 MMHG | RESPIRATION RATE: 20 BRPM | HEART RATE: 75 BPM

## 2022-07-01 DIAGNOSIS — M79.604 LEG PAIN, BILATERAL: ICD-10-CM

## 2022-07-01 DIAGNOSIS — R09.89 DECREASED PEDAL PULSES: Primary | ICD-10-CM

## 2022-07-01 DIAGNOSIS — D75.829 HIT (HEPARIN-INDUCED THROMBOCYTOPENIA) (H): ICD-10-CM

## 2022-07-01 DIAGNOSIS — I10 ESSENTIAL HYPERTENSION: ICD-10-CM

## 2022-07-01 DIAGNOSIS — M79.605 LEG PAIN, BILATERAL: ICD-10-CM

## 2022-07-01 PROCEDURE — 99214 OFFICE O/P EST MOD 30 MIN: CPT | Performed by: NURSE PRACTITIONER

## 2022-07-01 PROCEDURE — G0463 HOSPITAL OUTPT CLINIC VISIT: HCPCS

## 2022-07-01 RX ORDER — METOPROLOL TARTRATE 50 MG
50 TABLET ORAL 2 TIMES DAILY
Qty: 180 TABLET | Refills: 3 | Status: SHIPPED | OUTPATIENT
Start: 2022-07-01 | End: 2023-06-08

## 2022-07-01 RX ORDER — LISINOPRIL 10 MG/1
10 TABLET ORAL DAILY
Qty: 90 TABLET | Refills: 3 | Status: SHIPPED | OUTPATIENT
Start: 2022-07-01 | End: 2022-10-11

## 2022-07-01 ASSESSMENT — ENCOUNTER SYMPTOMS: LEG PAIN: 1

## 2022-07-01 ASSESSMENT — PAIN SCALES - GENERAL: PAINLEVEL: MODERATE PAIN (5)

## 2022-07-01 NOTE — PROGRESS NOTES
Assessment & Plan   Problem List Items Addressed This Visit        Circulatory    Essential hypertension (Chronic)    Relevant Medications    lisinopril (ZESTRIL) 10 MG tablet    metoprolol tartrate (LOPRESSOR) 50 MG tablet      Other Visit Diagnoses     Decreased pedal pulses    -  Primary    Relevant Orders    US TC Doppler No Exercise 1-2 Levels Bilateral    Leg pain, bilateral        Relevant Orders    US TC Doppler No Exercise 1-2 Levels Bilateral         We will increase his metoprolol to 50 mg twice daily to try to get blood pressure under little bit better control.  I would like to maximize All medications before adding new medications and he would like to do this as well.  He will continue to monitor her blood pressure and pulse.  If either these are dropping low he will go back down to 25 mg twice daily and reach out to me for further adjustments.  Consider amlodipine in the future.    We discussed his right leg pain.  Discussed peripheral neuropathy, pinched nerve, sciatica and intermittent claudication/PAD.  Given the cool sensation of his leg, pain with ambulation and decreased pulses I am most suspicious of intermittent claudication.  Ankle-brachial index was ordered.  He will continue with symptomatic management until test results are available.  I suspect if this is the case, we will get him established with vascular medicine given his complex medical history including history of lung transplant.    Ordering of each unique test  Prescription drug management  32 minutes spent on the date of the encounter doing chart review, history and exam, documentation and further activities per the note         No follow-ups on file.    BRANDI Manning [facility]    Albert Burgos is a 67 year old, presenting for the following health issues:  Leg Pain (Right leg is worse )      Leg Pain         Pain History:  When did you first notice your pain? - Acute Pain   Have you seen  anyone else for your pain? No  Where in your body do you have pain? right leg pain    He comes in today for 2 concerns.    First concern is his leg pain.  Ever since he was in the hospital last month he has had worsening pain in his right leg.  While he was in the hospital he was having pain mostly in the right calf and leg was very cool to touch.  They did do an ultrasound to rule out DVT.  He states the pain has continued and now has pain to the back of his thigh into his knee and into the calf.  He is not having any low back concerns.  He does have peripheral neuropathy symptoms primarily in feet and lower legs.  He is not currently taking medications for this.  He does report that he has increased pain whenever he is walking.  He reports this as an aching and cold sensation.  Sometimes the pain will wake him up at night.  He has not had any swelling and feels a cool sensation has somewhat improved.  He did go to the chiropractor x2 without relief of symptoms.  He is using Tylenol during the day and occasionally uses Dilaudid at night to help with the pain.    His second concern is his blood pressure.  While he was in the hospital his lisinopril was stopped due to acute kidney injury.  This was increased to 5 mg at his last visit and he reports he increase it back to 10 mg daily because his blood pressures are running in the 150-170s over 90s to 100s.  Blood pressures typically run a little bit lower in the afternoon but are always high in the evening and have never been quite as high as they have been.  He currently uses furosemide 20 mg daily, lisinopril 10 mg daily metoprolol 25 mg twice daily.  He continues to work with his transplant team regarding Imuran and other rejection medications.  He has blood work coming up again next week.    Review of Systems   See above      Objective    BP (!) 145/90 (BP Location: Right arm, Patient Position: Sitting, Cuff Size: Adult Large)   Pulse 75   Temp 97.4  F (36.3  C)  (Tympanic)   Resp 20   Wt 72.7 kg (160 lb 3.2 oz)   SpO2 98%   BMI 25.09 kg/m    Body mass index is 25.09 kg/m .  Physical Exam   GENERAL: healthy, alert and no distress  CV: decreased pedal pulses bilaterally, feet cool with diminished capillary refill  MS: no gross musculoskeletal defects noted, no edema  SKIN: no suspicious lesions or rashes  NEURO: Normal strength and tone, mentation intact and speech normal  PSYCH: mentation appears normal, affect normal/bright                .  ..

## 2022-07-01 NOTE — NURSING NOTE
Pt presents to clinic today for right leg pain. Patient states this started 6/20/22 after being in the hospital for passing out.       FOOD SECURITY SCREENING QUESTIONS:    The next two questions are to help us understand your food security.  If you are feeling you need any assistance in this area, we have resources available to support you today.    Hunger Vital Signs:  Within the past 12 months we worried whether our food would run out before we got money to buy more. Never  Within the past 12 months the food we bought just didn't last and we didn't have money to get more. Never            Medication Reconciliation: complete  Thaddeus Cook, BRIDGETT,LPN on 7/1/2022 at 7:55 AM

## 2022-07-05 ENCOUNTER — MYC MEDICAL ADVICE (OUTPATIENT)
Dept: FAMILY MEDICINE | Facility: OTHER | Age: 67
End: 2022-07-05

## 2022-07-05 DIAGNOSIS — I10 ESSENTIAL HYPERTENSION: Primary | ICD-10-CM

## 2022-07-05 RX ORDER — AMLODIPINE BESYLATE 5 MG/1
5 TABLET ORAL DAILY
Qty: 30 TABLET | Refills: 0 | Status: SHIPPED | OUTPATIENT
Start: 2022-07-05 | End: 2022-07-14

## 2022-07-06 ENCOUNTER — TELEPHONE (OUTPATIENT)
Dept: TRANSPLANT | Facility: CLINIC | Age: 67
End: 2022-07-06

## 2022-07-06 NOTE — TELEPHONE ENCOUNTER
"Fax received from [facility] mail order pharmacy for Metoprolol saying \"patient needs tomorrow\".  Medication and a couple of others were just sent over to [facility] on 7/1/2022.  Confirmed with patient that all of his meds should have gone to Mail order.  Called [facility] mail order, was transferred to transplant team and notified them-they will call [facility] and have them transferred.  Olga Lidia Strickland RN on 7/6/2022 at 2:27 PM    "

## 2022-07-06 NOTE — TELEPHONE ENCOUNTER
Pt called and requested earlier appt for US TC doppler to be completed at the [facility] instead of [facility].  Appt made for 7/8 at 12:20 PM.  Pt confirmed that this works for him and he will have this done.

## 2022-07-08 ENCOUNTER — ANCILLARY PROCEDURE (OUTPATIENT)
Dept: ULTRASOUND IMAGING | Facility: CLINIC | Age: 67
End: 2022-07-08
Attending: NURSE PRACTITIONER
Payer: MEDICARE

## 2022-07-08 ENCOUNTER — MYC MEDICAL ADVICE (OUTPATIENT)
Dept: FAMILY MEDICINE | Facility: OTHER | Age: 67
End: 2022-07-08

## 2022-07-08 DIAGNOSIS — R09.89 DECREASED PEDAL PULSES: ICD-10-CM

## 2022-07-08 DIAGNOSIS — I10 ESSENTIAL HYPERTENSION: ICD-10-CM

## 2022-07-08 DIAGNOSIS — M79.605 LEG PAIN, BILATERAL: ICD-10-CM

## 2022-07-08 DIAGNOSIS — M79.604 LEG PAIN, BILATERAL: ICD-10-CM

## 2022-07-08 PROCEDURE — 93922 UPR/L XTREMITY ART 2 LEVELS: CPT | Performed by: RADIOLOGY

## 2022-07-10 ENCOUNTER — MYC MEDICAL ADVICE (OUTPATIENT)
Dept: FAMILY MEDICINE | Facility: OTHER | Age: 67
End: 2022-07-10

## 2022-07-10 DIAGNOSIS — I73.9 PAD (PERIPHERAL ARTERY DISEASE) (H): Primary | ICD-10-CM

## 2022-07-11 ENCOUNTER — LAB (OUTPATIENT)
Dept: LAB | Facility: OTHER | Age: 67
End: 2022-07-11
Attending: NURSE PRACTITIONER
Payer: MEDICARE

## 2022-07-11 DIAGNOSIS — Z94.2 LUNG REPLACED BY TRANSPLANT (H): ICD-10-CM

## 2022-07-11 LAB
ANION GAP SERPL CALCULATED.3IONS-SCNC: 7 MMOL/L (ref 3–14)
BUN SERPL-MCNC: 33 MG/DL (ref 7–25)
CALCIUM SERPL-MCNC: 8.8 MG/DL (ref 8.6–10.3)
CHLORIDE BLD-SCNC: 106 MMOL/L (ref 98–107)
CO2 SERPL-SCNC: 24 MMOL/L (ref 21–31)
CREAT SERPL-MCNC: 1.33 MG/DL (ref 0.7–1.3)
GFR SERPL CREATININE-BSD FRML MDRD: 58 ML/MIN/1.73M2
GLUCOSE BLD-MCNC: 94 MG/DL (ref 70–105)
POTASSIUM BLD-SCNC: 4.9 MMOL/L (ref 3.5–5.1)
SODIUM SERPL-SCNC: 137 MMOL/L (ref 134–144)
TACROLIMUS BLD-MCNC: 7.5 UG/L (ref 5–15)
TME LAST DOSE: NORMAL H
TME LAST DOSE: NORMAL H

## 2022-07-11 PROCEDURE — 36415 COLL VENOUS BLD VENIPUNCTURE: CPT | Mod: ZL

## 2022-07-11 PROCEDURE — 80197 ASSAY OF TACROLIMUS: CPT | Mod: ZL

## 2022-07-11 PROCEDURE — 80048 BASIC METABOLIC PNL TOTAL CA: CPT | Mod: ZL

## 2022-07-11 NOTE — TELEPHONE ENCOUNTER
"  Last Prescription Date: 6/21/21  Last Qty/Refills: 90 / 4  Last Office Visit: 7/1/22  Future Office Visit: 7/14/22     Requested Prescriptions   Pending Prescriptions Disp Refills     furosemide (LASIX) 20 MG tablet 90 tablet 4     Sig: Take 1 tablet (20 mg) by mouth daily       Diuretics (Including Combos) Protocol Failed - 7/8/2022 10:47 AM        Failed - Blood pressure under 140/90 in past 12 months     BP Readings from Last 3 Encounters:   07/01/22 (!) 160/100   06/23/22 136/72   05/26/22 121/69                 Failed - Normal serum creatinine on file in past 12 months     Recent Labs   Lab Test 07/11/22  0758   CR 1.33*              Failed - Normal serum potassium on file in past 12 months     Recent Labs   Lab Test 07/11/22  0758   POTASSIUM 4.9                    Passed - Recent (12 mo) or future (30 days) visit within the authorizing provider's specialty     Patient has had an office visit with the authorizing provider or a provider within the authorizing providers department within the previous 12 mos or has a future within next 30 days. See \"Patient Info\" tab in inbasket, or \"Choose Columns\" in Meds & Orders section of the refill encounter.              Passed - Medication is active on med list        Passed - Patient is age 18 or older        Passed - Normal serum sodium on file in past 12 months     Recent Labs   Lab Test 07/11/22  0758                      "

## 2022-07-12 ENCOUNTER — TELEPHONE (OUTPATIENT)
Dept: FAMILY MEDICINE | Facility: OTHER | Age: 67
End: 2022-07-12

## 2022-07-12 ENCOUNTER — MYC MEDICAL ADVICE (OUTPATIENT)
Dept: OTHER | Age: 67
End: 2022-07-12

## 2022-07-12 ENCOUNTER — MYC MEDICAL ADVICE (OUTPATIENT)
Dept: FAMILY MEDICINE | Facility: OTHER | Age: 67
End: 2022-07-12

## 2022-07-12 DIAGNOSIS — Z94.2 LUNG REPLACED BY TRANSPLANT (H): Primary | ICD-10-CM

## 2022-07-12 DIAGNOSIS — I73.9 CLAUDICATION OF BOTH LOWER EXTREMITIES (H): Primary | ICD-10-CM

## 2022-07-12 RX ORDER — FUROSEMIDE 20 MG
20 TABLET ORAL DAILY
Qty: 90 TABLET | Refills: 4 | Status: SHIPPED | OUTPATIENT
Start: 2022-07-12 | End: 2023-08-31

## 2022-07-12 NOTE — TELEPHONE ENCOUNTER
Please call patient and let him know that in further review, it is recommended that we do a CT angiogram to better determine concern for peripheral artery disease.  This can be done at [facility].  I have placed an order for this.  I will continue to work with vascular specialist regarding remainder of work-up recommended. Hoa Olivarez, BRANDI CNP on 7/12/2022 at 4:46 PM

## 2022-07-12 NOTE — RESULT ENCOUNTER NOTE
Tacrolimus level 7.5 at 12 hours, on 7/11/22.  Goal 8-10.   Current dose 1.5 mg in AM, 1.5 mg in PM    Level close to goal.  No dose change.    [employer] message sent

## 2022-07-12 NOTE — TELEPHONE ENCOUNTER
[facility] lab unable to add on CBC to pt's labs from 7/11.  Plan for pt to recheck CBC to determine if able to restart azathioprine, which was placed on hold during pt's recent hospitalization.

## 2022-07-13 DIAGNOSIS — E11.9 DIABETES MELLITUS TYPE 2, DIET-CONTROLLED (H): ICD-10-CM

## 2022-07-13 RX ORDER — LANCETS
EACH MISCELLANEOUS
Qty: 400 EACH | Refills: 1 | Status: SHIPPED | OUTPATIENT
Start: 2022-07-13 | End: 2023-09-19

## 2022-07-13 NOTE — TELEPHONE ENCOUNTER
After talking further with vascular surgeon, will hold on CTA per her recommendation pending other testing recommendations.  BRANDI Manning CNP on 7/13/2022 at 8:22 AM

## 2022-07-13 NOTE — TELEPHONE ENCOUNTER
[id number] ([employer]) of [place] sent Rx request for the following:     PATIENT IS REQUESTING A REFILL FOR TODAY- THEY WILL  THIS AFTERNOON     Requested Prescriptions   Pending Prescriptions Disp Refills     Microlet Lancets MISC [Pharmacy Med Name: MICROLET LANCET] 400 each 1     Sig: USE AS DIRECTED FOR TESTINGBLOOD GLUCOSE 4 TIMES DAILY. DISPENSE AS COVERED BY INSURANCE. DX. CODE: E11.9.   Last Prescription Date:   8/10/21  Last Fill Qty/Refills:         400, R-1         blood glucose (NO BRAND SPECIFIED) test strip [Pharmacy Med Name: CONTOUR MEDIC/DME STRIP 50] 300 strip 0     Sig: USE TO TEST BLOOD GLUCOSE 3TIMES DAILY. DX CODE:E11.9   Last Prescription Date:   1/26/22  Last Fill Qty/Refills:         300, R-0      Last Office Visit:              7/1/22   Future Office visit:             Next 5 appointments (look out 90 days)    Jul 14, 2022  3:00 PM  SHORT with BRANDI [facility] ([facility] ) [address]  [place] [id number] [phone]        Please clarify if Pt is testing 3 or 4 times daily.    Brooke Nicholas RN .............. 7/13/2022  4:04 PM

## 2022-07-14 ENCOUNTER — TELEPHONE (OUTPATIENT)
Dept: TRANSPLANT | Facility: CLINIC | Age: 67
End: 2022-07-14

## 2022-07-14 ENCOUNTER — OFFICE VISIT (OUTPATIENT)
Dept: FAMILY MEDICINE | Facility: OTHER | Age: 67
End: 2022-07-14
Attending: NURSE PRACTITIONER
Payer: MEDICARE

## 2022-07-14 VITALS
HEIGHT: 67 IN | DIASTOLIC BLOOD PRESSURE: 75 MMHG | RESPIRATION RATE: 18 BRPM | BODY MASS INDEX: 25.33 KG/M2 | OXYGEN SATURATION: 97 % | WEIGHT: 161.4 LBS | SYSTOLIC BLOOD PRESSURE: 122 MMHG | TEMPERATURE: 97.6 F | HEART RATE: 84 BPM

## 2022-07-14 DIAGNOSIS — D36.9 TUBULAR ADENOMA: Primary | ICD-10-CM

## 2022-07-14 DIAGNOSIS — Z12.11 COLON CANCER SCREENING: ICD-10-CM

## 2022-07-14 DIAGNOSIS — N18.32 CHRONIC KIDNEY DISEASE, STAGE 3B (H): ICD-10-CM

## 2022-07-14 DIAGNOSIS — I10 ESSENTIAL HYPERTENSION: ICD-10-CM

## 2022-07-14 DIAGNOSIS — L98.9 SKIN LESION: ICD-10-CM

## 2022-07-14 DIAGNOSIS — Z79.4 TYPE 2 DIABETES MELLITUS WITH DIABETIC POLYNEUROPATHY, WITH LONG-TERM CURRENT USE OF INSULIN (H): ICD-10-CM

## 2022-07-14 DIAGNOSIS — Z94.2 LUNG REPLACED BY TRANSPLANT (H): ICD-10-CM

## 2022-07-14 DIAGNOSIS — E11.42 TYPE 2 DIABETES MELLITUS WITH DIABETIC POLYNEUROPATHY, WITH LONG-TERM CURRENT USE OF INSULIN (H): ICD-10-CM

## 2022-07-14 PROBLEM — J15.1 PNEUMONIA OF RIGHT LOWER LOBE DUE TO PSEUDOMONAS SPECIES (H): Status: RESOLVED | Noted: 2019-02-28 | Resolved: 2022-07-14

## 2022-07-14 LAB
ERYTHROCYTE [DISTWIDTH] IN BLOOD BY AUTOMATED COUNT: 13.2 % (ref 10–15)
HCT VFR BLD AUTO: 36.7 % (ref 40–53)
HGB BLD-MCNC: 12.2 G/DL (ref 13.3–17.7)
MCH RBC QN AUTO: 34.1 PG (ref 26.5–33)
MCHC RBC AUTO-ENTMCNC: 33.2 G/DL (ref 31.5–36.5)
MCV RBC AUTO: 103 FL (ref 78–100)
PLATELET # BLD AUTO: 189 10E3/UL (ref 150–450)
RBC # BLD AUTO: 3.58 10E6/UL (ref 4.4–5.9)
WBC # BLD AUTO: 7.1 10E3/UL (ref 4–11)

## 2022-07-14 PROCEDURE — 99214 OFFICE O/P EST MOD 30 MIN: CPT | Performed by: NURSE PRACTITIONER

## 2022-07-14 PROCEDURE — 85027 COMPLETE CBC AUTOMATED: CPT | Mod: ZL | Performed by: NURSE PRACTITIONER

## 2022-07-14 PROCEDURE — 36415 COLL VENOUS BLD VENIPUNCTURE: CPT | Mod: ZL | Performed by: NURSE PRACTITIONER

## 2022-07-14 PROCEDURE — G0463 HOSPITAL OUTPT CLINIC VISIT: HCPCS

## 2022-07-14 PROCEDURE — 82043 UR ALBUMIN QUANTITATIVE: CPT | Mod: ZL | Performed by: NURSE PRACTITIONER

## 2022-07-14 RX ORDER — AMLODIPINE BESYLATE 5 MG/1
5 TABLET ORAL DAILY
Qty: 90 TABLET | Refills: 3 | Status: SHIPPED | OUTPATIENT
Start: 2022-07-14 | End: 2022-10-11

## 2022-07-14 ASSESSMENT — PAIN SCALES - GENERAL: PAINLEVEL: MILD PAIN (3)

## 2022-07-14 NOTE — PROGRESS NOTES
Assessment & Plan   Problem List Items Addressed This Visit        Nervous and Auditory    Type 2 diabetes mellitus with diabetic polyneuropathy, with long-term current use of insulin (H)    Relevant Orders    Albumin Random Urine Quantitative with Creat Ratio       Circulatory    Essential hypertension (Chronic)    Relevant Medications    amLODIPine (NORVASC) 5 MG tablet       Urinary    Chronic kidney disease, stage 3b (H)    Relevant Orders    Albumin Random Urine Quantitative with Creat Ratio       Other    Tubular adenoma - Primary    Relevant Orders    Adult GI  Referral - Procedure Only      Other Visit Diagnoses     Colon cancer screening        Relevant Orders    Adult GI  Referral - Procedure Only    Lung replaced by transplant (H)        Skin lesion        Relevant Orders    Adult General Surg Referral      Referral for colonoscopy was placed  Referral for surgery for a skin lesion excision consideration  Urine albumin obtained and pending for monitoring of chronic kidney disease  Refilled amlodipine 5 mg daily, blood pressures have stabilized and we will keep him at this dose along with other blood pressure medications      Ordering of each unique test  Prescription drug management  38 minutes spent on the date of the encounter doing chart review, history and exam, documentation and further activities per the note         No follow-ups on file.    BRANDI Manning CNP  [facility]    Albert Burgos is a 67 year old, presenting for the following health issues:  No chief complaint on file.      History of Present Illness       Hypertension: He presents for follow up of hypertension.  He does check blood pressure  regularly outside of the clinic. Outside blood pressures have been over 140/90. He does not follow a low salt diet.       He comes in today for recheck of his blood pressure.  He has continued to struggle with elevated blood pressures despite  "medication adjustments.  He does not bring in his readings today but reports in the morning before he takes his medications his blood pressures are in the 150s to 160s.  After taking his medications these come down to 120s and 130s.  He is tolerating the medications well and reports he is showing continued improvement.    He continues have pain and a cold sensation especially into his right foot.  Pain has been quite significant.  TC was completed, referral to vascular surgeon and further testing is being completed.  He has ultrasound tomorrow and follow-up with surgeon next week.    He has a couple skin lesions that he would like to have me look at.  He has 1 on his left forearm that is hard and continues to grow and a lesion on his chin.  He did have the chin lesion froze by dermatology with no change and is wondering about having this excised.    He is due for his colonoscopy, this got missed due to other health issues.  He does have tubular adenoma and was due to have this checked in 2021.    Chronic kidney disease, kidney functions on blood work have stabilized but he has not had microalbumin completed recently.  He will be getting blood work completed today for checking his white count so he can restart his Imuran.    Review of Systems   As noted above      Objective    /75   Pulse 84   Temp 97.6  F (36.4  C)   Resp 18   Ht 1.702 m (5' 7\")   Wt 73.2 kg (161 lb 6.4 oz)   SpO2 97%   BMI 25.28 kg/m    Body mass index is 25.28 kg/m .  Physical Exam   GENERAL: healthy, alert and no distress  RESP: lungs clear to auscultation - no rales, rhonchi or wheezes  CV: regular rate and rhythm, normal S1 S2, no peripheral edema  SKIN: Left forearm with 0.4 cm hard lesion.  Chin with 0.5 cm skin colored round lesion.  NEURO: Normal strength and tone, mentation intact and speech normal  PSYCH: mentation appears normal, affect normal/bright  Diabetic foot exam: no trophic changes or ulcerative lesions and normal " monofilament exam, except reduced/no sensation to right toes.  Bilateral feet are warm to touch.                      .  ..

## 2022-07-14 NOTE — TELEPHONE ENCOUNTER
Received phone call from pt, requesting a sooner appointment for his JOSHUA ESCOBAR.  Writer scheduled appt at [facility] for 7/15.      Pt plans to recheck labs today, including WBC.

## 2022-07-15 ENCOUNTER — ANCILLARY PROCEDURE (OUTPATIENT)
Dept: ULTRASOUND IMAGING | Facility: CLINIC | Age: 67
End: 2022-07-15
Attending: SURGERY
Payer: MEDICARE

## 2022-07-15 DIAGNOSIS — I73.9 PAD (PERIPHERAL ARTERY DISEASE) (H): ICD-10-CM

## 2022-07-15 LAB
CREAT UR-MCNC: 97.4 MG/DL
MICROALBUMIN UR-MCNC: <12 MG/L
MICROALBUMIN/CREAT UR: NORMAL MG/G{CREAT}

## 2022-07-15 PROCEDURE — 93926 LOWER EXTREMITY STUDY: CPT | Mod: RT | Performed by: RADIOLOGY

## 2022-07-15 NOTE — RESULT ENCOUNTER NOTE
WBC 7.1 on 7/14.  Plan for pt to resume azathioprine 25 mg daily.  Will continue to monitor WBC.  [employer] message sent to patient.

## 2022-07-18 ENCOUNTER — TELEPHONE (OUTPATIENT)
Dept: FAMILY MEDICINE | Facility: OTHER | Age: 67
End: 2022-07-18

## 2022-07-18 DIAGNOSIS — I73.9 CLAUDICATION (H): Primary | ICD-10-CM

## 2022-07-18 NOTE — TELEPHONE ENCOUNTER
Per recommendations from vascular surgeon, recommend aspirin 81 mg daily as well as ordering the CT angiogram with runoff.  Orders were placed for both.  [employer] message sent to patient to update him on these. BRANDI Manning CNP on 7/18/2022 at 12:52 PM

## 2022-07-19 ENCOUNTER — OFFICE VISIT (OUTPATIENT)
Dept: SURGERY | Facility: OTHER | Age: 67
End: 2022-07-19
Attending: NURSE PRACTITIONER
Payer: MEDICARE

## 2022-07-19 VITALS
OXYGEN SATURATION: 97 % | RESPIRATION RATE: 20 BRPM | SYSTOLIC BLOOD PRESSURE: 122 MMHG | WEIGHT: 160 LBS | TEMPERATURE: 97.7 F | HEART RATE: 79 BPM | DIASTOLIC BLOOD PRESSURE: 68 MMHG | BODY MASS INDEX: 25.06 KG/M2

## 2022-07-19 DIAGNOSIS — L98.9 SKIN LESION: Primary | ICD-10-CM

## 2022-07-19 PROCEDURE — 11441 EXC FACE-MM B9+MARG 0.6-1 CM: CPT | Performed by: SURGERY

## 2022-07-19 PROCEDURE — 11601 EXC TR-EXT MAL+MARG 0.6-1 CM: CPT | Performed by: SURGERY

## 2022-07-19 PROCEDURE — 11400 EXC TR-EXT B9+MARG 0.5 CM<: CPT | Performed by: SURGERY

## 2022-07-19 PROCEDURE — 11440 EXC FACE-MM B9+MARG 0.5 CM/<: CPT | Performed by: SURGERY

## 2022-07-19 PROCEDURE — 11643 EXC F/E/E/N/L MAL+MRG 2.1-3: CPT | Performed by: SURGERY

## 2022-07-19 PROCEDURE — G0463 HOSPITAL OUTPT CLINIC VISIT: HCPCS

## 2022-07-19 PROCEDURE — 88305 TISSUE EXAM BY PATHOLOGIST: CPT

## 2022-07-19 PROCEDURE — 11641 EXC F/E/E/N/L MAL+MRG 0.6-1: CPT | Performed by: SURGERY

## 2022-07-19 ASSESSMENT — PAIN SCALES - GENERAL: PAINLEVEL: NO PAIN (0)

## 2022-07-19 NOTE — NURSING NOTE
"Chief Complaint   Patient presents with     Derm Problem     Here today with several lesions he would like looked at, including one in inside lower lip.       Initial /68 (BP Location: Left arm, Patient Position: Sitting, Cuff Size: Adult Regular)   Pulse 79   Temp 97.7  F (36.5  C) (Tympanic)   Resp 20   Wt 72.6 kg (160 lb)   SpO2 97%   BMI 25.06 kg/m   Estimated body mass index is 25.06 kg/m  as calculated from the following:    Height as of 7/14/22: 1.702 m (5' 7\").    Weight as of this encounter: 72.6 kg (160 lb).  Medication Reconciliation: complete    Sussy Rivera LPN     TIMEOUT  [place] Protocol    A. Pre-procedure verification complete     1-relevant information / documentation available, reviewed and properly matched to the patient; 2-consent accurate and complete, 3-equipment and supplies available    B. Site marking complete     Site marked if not in continuous attendance with patient    C. TIME OUT completed     Time Out was conducted just prior to starting procedure to verify the eight required elements: 1-patient identity, 2-consent accurate and complete, 3-position, 4-correct side/site marked (if applicable), 5-procedure, 6-relevant images / results properly labeled and displayed (if applicable), 7-antibiotics / irrigation fluids (if applicable), 8-safety precautions.  "

## 2022-07-19 NOTE — PATIENT INSTRUCTIONS
Your incision was closed with stitches that will dissolve.    It is ok to get the incision wet in the shower on the day after your procedure.     Don't soak in a tub, pool or lake for 1 week.  If you have concerns, please call.

## 2022-07-19 NOTE — PROGRESS NOTES
Procedure Note      Pre/Post Operative Diagnosis:   Left wrist skin lesion, skin lesion of the chin, right lower lip skin lesion     Procedure:   Removal of  Left wrist skin lesion, skin lesion of the chin, right lower lip skin lesion      Surgeon: Quique Olivarez MD    Local Anesthesia: 1% lidocaine with 0.25% Marcaine with epinephrine    Indication for the procedure:  This is a 67 year old male  patient referred by Hoa Olivarez with a  Left wrist skin lesion, skin lesion of the chin, right lower lip skin lesion.       After explaining the risks to include bleeding, infection, recurrence or need for reexcision, and scarring the patient wished to proceed.    Procedure:   The left wrist area was prepped and draped in usual sterile fashion with ChloraPrep.   After, adequate local anesthesia, an 1 cm X 0.5 cm elliptical skin incision was made to encompass the  0.5 cm lesion.  The skin was closed with 4-0 Monocryl and Dermabond.      The chin the skin lesion was anesthetized after being prepped.  A 1 cm x 0.5 cm elliptical biopsy was removed to remove this lesion.  4-0 Monocryl was used for hemostasis and skin closure. glue was applied.    Right lower lip was elliptically biopsied for a measurement of 2.5 cm x 1 cm.  The mucosal portion and the skin portion were both approximated with 3-0 chromic suture.  This was done in a running and locking fashion.  Care was taken to approximate the vermilion border.    Plan:  The patient will be called to review the pathology. Patient will follow up if there any problems with the wound including redness or drainage.      Quique Olivarez MD....................  7/19/2022   10:32 AM

## 2022-07-19 NOTE — NURSING NOTE
"Chief Complaint   Patient presents with     Derm Problem     Here today with several lesions he would like looked at, including one in inside lower lip.       Initial /68 (BP Location: Left arm, Patient Position: Sitting, Cuff Size: Adult Regular)   Pulse 79   Temp 97.7  F (36.5  C) (Tympanic)   Resp 20   Wt 72.6 kg (160 lb)   SpO2 97%   BMI 25.06 kg/m   Estimated body mass index is 25.06 kg/m  as calculated from the following:    Height as of 7/14/22: 1.702 m (5' 7\").    Weight as of this encounter: 72.6 kg (160 lb).  Medication Reconciliation: complete    Sussy Rivera LPN  "

## 2022-07-21 ENCOUNTER — ANCILLARY PROCEDURE (OUTPATIENT)
Dept: CT IMAGING | Facility: CLINIC | Age: 67
End: 2022-07-21
Attending: NURSE PRACTITIONER
Payer: MEDICARE

## 2022-07-21 ENCOUNTER — DOCUMENTATION ONLY (OUTPATIENT)
Dept: PULMONOLOGY | Facility: CLINIC | Age: 67
End: 2022-07-21

## 2022-07-21 ENCOUNTER — APPOINTMENT (OUTPATIENT)
Dept: ULTRASOUND IMAGING | Facility: CLINIC | Age: 67
End: 2022-07-21
Attending: EMERGENCY MEDICINE
Payer: MEDICARE

## 2022-07-21 ENCOUNTER — TELEPHONE (OUTPATIENT)
Dept: TRANSPLANT | Facility: CLINIC | Age: 67
End: 2022-07-21

## 2022-07-21 ENCOUNTER — HOSPITAL ENCOUNTER (EMERGENCY)
Facility: CLINIC | Age: 67
Discharge: HOME | End: 2022-07-21
Attending: EMERGENCY MEDICINE | Admitting: EMERGENCY MEDICINE
Payer: MEDICARE

## 2022-07-21 ENCOUNTER — TELEPHONE (OUTPATIENT)
Dept: FAMILY MEDICINE | Facility: OTHER | Age: 67
End: 2022-07-21

## 2022-07-21 VITALS
TEMPERATURE: 98.2 F | DIASTOLIC BLOOD PRESSURE: 77 MMHG | SYSTOLIC BLOOD PRESSURE: 129 MMHG | RESPIRATION RATE: 18 BRPM | HEIGHT: 67 IN | BODY MASS INDEX: 24.17 KG/M2 | HEART RATE: 79 BPM | OXYGEN SATURATION: 98 % | WEIGHT: 154 LBS

## 2022-07-21 DIAGNOSIS — I82.4Y1 DEEP VEIN THROMBOSIS (DVT) OF PROXIMAL VEIN OF RIGHT LOWER EXTREMITY, UNSPECIFIED CHRONICITY (H): ICD-10-CM

## 2022-07-21 DIAGNOSIS — I73.9 CLAUDICATION (H): ICD-10-CM

## 2022-07-21 DIAGNOSIS — I26.94 MULTIPLE SUBSEGMENTAL PULMONARY EMBOLI WITHOUT ACUTE COR PULMONALE (H): ICD-10-CM

## 2022-07-21 LAB
ALBUMIN SERPL-MCNC: 3.3 G/DL (ref 3.4–5)
ALP SERPL-CCNC: 54 U/L (ref 40–150)
ALT SERPL W P-5'-P-CCNC: 66 U/L (ref 0–70)
ANION GAP SERPL CALCULATED.3IONS-SCNC: 6 MMOL/L (ref 3–14)
APTT PPP: 28 SECONDS (ref 22–38)
AST SERPL W P-5'-P-CCNC: 38 U/L (ref 0–45)
BASOPHILS # BLD AUTO: 0 10E3/UL (ref 0–0.2)
BASOPHILS NFR BLD AUTO: 1 %
BILIRUB SERPL-MCNC: 0.3 MG/DL (ref 0.2–1.3)
BUN SERPL-MCNC: 37 MG/DL (ref 7–30)
CALCIUM SERPL-MCNC: 8.8 MG/DL (ref 8.5–10.1)
CHLORIDE BLD-SCNC: 109 MMOL/L (ref 94–109)
CO2 SERPL-SCNC: 25 MMOL/L (ref 20–32)
CREAT BLD-MCNC: 1.3 MG/DL (ref 0.7–1.3)
CREAT SERPL-MCNC: 1.16 MG/DL (ref 0.66–1.25)
D DIMER PPP FEU-MCNC: 3.58 UG/ML FEU (ref 0–0.5)
EOSINOPHIL # BLD AUTO: 0.1 10E3/UL (ref 0–0.7)
EOSINOPHIL NFR BLD AUTO: 1 %
ERYTHROCYTE [DISTWIDTH] IN BLOOD BY AUTOMATED COUNT: 13.4 % (ref 10–15)
GFR SERPL CREATININE-BSD FRML MDRD: 59 ML/MIN/1.73M2
GFR SERPL CREATININE-BSD FRML MDRD: 68 ML/MIN/1.73M2
GLUCOSE BLD-MCNC: 107 MG/DL (ref 70–99)
HCT VFR BLD AUTO: 38.9 % (ref 40–53)
HGB BLD-MCNC: 12.3 G/DL (ref 13.3–17.7)
HOLD SPECIMEN: NORMAL
HOLD SPECIMEN: NORMAL
IMM GRANULOCYTES # BLD: 0.1 10E3/UL
IMM GRANULOCYTES NFR BLD: 1 %
INR PPP: 1.07 (ref 0.85–1.15)
LYMPHOCYTES # BLD AUTO: 1.2 10E3/UL (ref 0.8–5.3)
LYMPHOCYTES NFR BLD AUTO: 17 %
MCH RBC QN AUTO: 33.2 PG (ref 26.5–33)
MCHC RBC AUTO-ENTMCNC: 31.6 G/DL (ref 31.5–36.5)
MCV RBC AUTO: 105 FL (ref 78–100)
MONOCYTES # BLD AUTO: 0.5 10E3/UL (ref 0–1.3)
MONOCYTES NFR BLD AUTO: 7 %
NEUTROPHILS # BLD AUTO: 5.3 10E3/UL (ref 1.6–8.3)
NEUTROPHILS NFR BLD AUTO: 73 %
NRBC # BLD AUTO: 0 10E3/UL
NRBC BLD AUTO-RTO: 0 /100
PLATELET # BLD AUTO: 211 10E3/UL (ref 150–450)
POTASSIUM BLD-SCNC: 5.6 MMOL/L (ref 3.4–5.3)
PROT SERPL-MCNC: 6.7 G/DL (ref 6.8–8.8)
RADIOLOGIST FLAGS: ABNORMAL
RBC # BLD AUTO: 3.71 10E6/UL (ref 4.4–5.9)
SODIUM SERPL-SCNC: 140 MMOL/L (ref 133–144)
WBC # BLD AUTO: 7.2 10E3/UL (ref 4–11)

## 2022-07-21 PROCEDURE — 36415 COLL VENOUS BLD VENIPUNCTURE: CPT | Performed by: EMERGENCY MEDICINE

## 2022-07-21 PROCEDURE — 85379 FIBRIN DEGRADATION QUANT: CPT | Performed by: EMERGENCY MEDICINE

## 2022-07-21 PROCEDURE — 85004 AUTOMATED DIFF WBC COUNT: CPT | Performed by: EMERGENCY MEDICINE

## 2022-07-21 PROCEDURE — 80053 COMPREHEN METABOLIC PANEL: CPT | Performed by: EMERGENCY MEDICINE

## 2022-07-21 PROCEDURE — 99284 EMERGENCY DEPT VISIT MOD MDM: CPT | Mod: 25 | Performed by: EMERGENCY MEDICINE

## 2022-07-21 PROCEDURE — 80053 COMPREHEN METABOLIC PANEL: CPT | Performed by: PATHOLOGY

## 2022-07-21 PROCEDURE — G1010 CDSM STANSON: HCPCS | Mod: GC | Performed by: RADIOLOGY

## 2022-07-21 PROCEDURE — 99284 EMERGENCY DEPT VISIT MOD MDM: CPT | Performed by: EMERGENCY MEDICINE

## 2022-07-21 PROCEDURE — 85730 THROMBOPLASTIN TIME PARTIAL: CPT | Performed by: EMERGENCY MEDICINE

## 2022-07-21 PROCEDURE — 75635 CT ANGIO ABDOMINAL ARTERIES: CPT | Mod: MG | Performed by: RADIOLOGY

## 2022-07-21 PROCEDURE — 85610 PROTHROMBIN TIME: CPT | Performed by: EMERGENCY MEDICINE

## 2022-07-21 PROCEDURE — 93970 EXTREMITY STUDY: CPT

## 2022-07-21 RX ORDER — APIXABAN 5 MG (74)
KIT ORAL
Qty: 74 EACH | Refills: 0 | Status: SHIPPED | OUTPATIENT
Start: 2022-07-21 | End: 2022-08-20

## 2022-07-21 RX ORDER — IOPAMIDOL 755 MG/ML
150 INJECTION, SOLUTION INTRAVASCULAR ONCE
Status: COMPLETED | OUTPATIENT
Start: 2022-07-21 | End: 2022-07-21

## 2022-07-21 RX ORDER — IOPAMIDOL 755 MG/ML
100 INJECTION, SOLUTION INTRAVASCULAR ONCE
Status: DISCONTINUED | OUTPATIENT
Start: 2022-07-21 | End: 2022-07-21 | Stop reason: CLARIF

## 2022-07-21 RX ADMIN — IOPAMIDOL 150 ML: 755 INJECTION, SOLUTION INTRAVASCULAR at 07:05
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
## 2022-07-21 NOTE — TELEPHONE ENCOUNTER
Patient Call: Voicemail  Date/Time:7/21/22 0840am  Reason for call: Hoa BARAHONA called regarding updated imaging findings, patient had CT run off done at [facility], and would like to discuss what the radiologist, reported Bilateral Pulmonary Embolism in lower left lobe, and other findings she would like to discuss with you.

## 2022-07-21 NOTE — PROGRESS NOTES
I received a call from Hoa Olivarez NP ([phone]).  Aubrey Duncan is undergoing an evaluation for peripheral vascular disease.  On a contrast scan for his lower extremities, he was noted to have bilateral lower lobe pulmonary emboli.  To MsBina Olivarez's knowledge, these are entirely asymptomatic.  The study was done in [place] but Mr. Duncan is currently in [facility].  Ms. Olivarez will get in touch with the patient's physician in [place] to arrange admission for anticoagulation.  This can either be done locally or the patient can be referred to the [place].  Valentino Cristina MD

## 2022-07-21 NOTE — TELEPHONE ENCOUNTER
Spoke with Hoa BARAHONA regarding follow up to pt's CT run off.  She would like to discuss findings with pulmonologist.  Dr. Cristina notified to give Hoa BARAHONA a call to discuss findings and further recommendations.

## 2022-07-21 NOTE — TELEPHONE ENCOUNTER
I received a phone call from radiology resident to the [facility], Dr. Kaushal Schafer at 830 this morning notifying me that they found bilateral pulmonary emboli, PE of left lower lobe on his CTA with runoff.  These unexpected findings.  He also reports a cystic neoplasm on the pancreas that has grown over time and recommends an MRI of the pancreas for further work-up.  Finally it was noted that he has peripheral vascular disease.  I placed a call to his transplant team, left a message at 838 this morning.  At 943 this morning Luz, his transplant coordinator returned my call.  She was updated on the above concerns.  She reported she will talk to Dr. Valentino Cristina who is a transplant surgeon on-call as patient's primary transplant surgeon is out of the office.  This  Called me back at 1012 this morning stating that he should be admitted for work-up and anticoagulation.  Because this is unprovoked he likely will need to be on anticoagulation therapy at least 3 months if not indefinitely and should have DVT work-up as well.  Patient was called at 10:27 AM and notified of the above.  He plans to present to the hospital in [facility] to be evaluated in review of possible admission.  I did let him know that if they do not address the pancreas concerns while he is inpatient, we will address this upon his discharge and when he returns home.  He is aware of the importance of being seen soon as possible due to the risks of pulmonary embolus. Hoa Olivarez, BRANDI CNP on 7/21/2022 at 10:35 AM

## 2022-07-22 LAB
PATH REPORT.COMMENTS IMP SPEC: NORMAL
PATH REPORT.FINAL DX SPEC: NORMAL
PHOTO IMAGE: NORMAL

## 2022-07-22 NOTE — TELEPHONE ENCOUNTER
DIAGNOSIS: PAD (peripheral artery disease)   DATE RECEIVED: 7.27.22   NOTES STATUS DETAILS   OFFICE NOTE from referring provider Internal 7.14.22, 7.10.22, 7.1.22  Olivarez  [place]   OFFICE NOTE from other specialist Internal 7.21.22  [facility]   MEDICATION LIST Internal    PERTINENT LABS Internal    CTA (CT ANGIOGRAPHY) Internal 7.21.22  CTA Abd/Pelvis   ULTRASOUND Internal 7.21.22  US Low Ext Venous Duplex Matt    7.15.22  US Low Ext Arterial Duplex Right    7.8.22  US TC Doppler/no exer/Matt

## 2022-07-22 NOTE — DISCHARGE INSTRUCTIONS
Apixaban as prescribed, per hematology recommendation continue aspirin 81 mg daily    Follow-up for further evaluation regarding peripheral vascular disease    Recheck for chest pain, shortness of air or any other concern    Follow-up primary care for further evaluation

## 2022-07-27 ENCOUNTER — PRE VISIT (OUTPATIENT)
Dept: VASCULAR SURGERY | Facility: CLINIC | Age: 67
End: 2022-07-27

## 2022-07-29 DIAGNOSIS — Z94.2 S/P LUNG TRANSPLANT (H): Chronic | ICD-10-CM

## 2022-07-29 RX ORDER — SULFAMETHOXAZOLE AND TRIMETHOPRIM 400; 80 MG/1; MG/1
1 TABLET ORAL
Qty: 13 TABLET | Refills: 11 | Status: SHIPPED | OUTPATIENT
Start: 2022-07-29 | End: 2022-11-18 | Stop reason: ALTCHOICE

## 2022-08-03 ENCOUNTER — VIRTUAL VISIT (OUTPATIENT)
Dept: VASCULAR SURGERY | Facility: CLINIC | Age: 67
End: 2022-08-03
Attending: NURSE PRACTITIONER
Payer: MEDICARE

## 2022-08-03 DIAGNOSIS — I70.219 INTERMITTENT CLAUDICATION DUE TO ATHEROSCLEROSIS OF ARTERY OF EXTREMITY (H): ICD-10-CM

## 2022-08-03 DIAGNOSIS — N18.32 CHRONIC KIDNEY DISEASE, STAGE 3B (H): Primary | ICD-10-CM

## 2022-08-03 DIAGNOSIS — I73.9 PAD (PERIPHERAL ARTERY DISEASE) (H): ICD-10-CM

## 2022-08-03 DIAGNOSIS — Z94.2 S/P LUNG TRANSPLANT (H): ICD-10-CM

## 2022-08-03 PROCEDURE — 99205 OFFICE O/P NEW HI 60 MIN: CPT | Mod: 95 | Performed by: SURGERY

## 2022-08-03 NOTE — LETTER
8/3/2022       RE: Aubrey Duncan  [address]  [address] [place] [id number]  [facility] [id number]     Dear Colleague,    Thank you for referring your patient, Aubrey Duncan, to the [facility] VASCULAR CLINIC SERVIN at [facility]. Please see a copy of my visit note below.      Vascular Surgery Consultation Note     Patient:  Aubrey Duncan   Date of birth 1955, Medical record number [id number]  Date of Visit:  08/03/2022  Consult Requester:Hoa Olivarez APRN *            Assessment and Recommendations:   ASSESSMENT / RECOMMENDATION:    Disabling right lower extremity claudication unresponsive to exercise therapy, medication and smoking cessation with appearance of right lower extremity popliteal acute thrombosis proximal to single vessel runoff. I have recommended an outpatient RLE arteriogram with possible intervention. He will remain on his aspirin and not stop it. We will hold his anticoagulation two days prior to the procedure. All questions answered. Encouraged him to reach out if additional questions arise.    Many thanks for involving me in the care of this very pleasant patient. Should any questions or concerns arise, please don't hesitate to contact me.    Warm Regards,    Vanda Boyd MD, DFSVS, RPVI  Director, [facility] Vascular Services  Professor and Chief, Vascular and Endovascular Surgery  [facility]  Pager: 1. Send message or 10 digit call back number Securely via [employer] with the Vocera Web Console (learn more here)              2. Outside of [facility]? Call [phone]        60 minutes spent on the date of the encounter doing chart review, review of outside records, review of test results, interpretation of tests, patient visit and documentation           HPI: Very pleasant 66 y/o double lung transplant patient with longstanding RLE claudication manageable that became severe in late July when he went to local ER  with heat stroke and right calf pain. He felt he had traumatized right calf while working on his tractor and found the calf and foot to be cold to the touch when he went to local ER in June.  At that time he was apparently under resuscitated and required a significant amount of fluid.  He had a venous duplex at that time showing no evidence of deep vein thrombosis.  His wife states that during the emergency room stay his right leg was painful and cold.  Eventually as he began his resuscitation things improved markedly and his right foot and below knee became more warm.  He subsequently was followed up at a later point and found to have deep vein thrombosis.  He then underwent ankle-brachial indices and a duplex revealing significant atherosclerotic disease, particularly with popliteal artery occlusion on the right.  A CT angiogram was performed and small PEs were noted in his transplanted lungs.  He was started on anticoagulation and is seeing me today.  He notes significant neuropathy with tingling and numbness on the bottom of his feet.  He denies rest pain or tissue loss.  Has been doing well with his transplanted lungs.  His wondering how long he needs to remain on anticoagulation particularly with regard to his lung transplant.      Review of Systems   Constitutional, HEENT, cardiovascular, pulmonary, GI, , musculoskeletal, neuro, skin, endocrine and psych systems are negative, except as otherwise noted.    Physical Exam   GENERAL: Healthy, alert and no distress  EYES: Eyes grossly normal to inspection.  No discharge or erythema, or obvious scleral/conjunctival abnormalities.  RESP: No audible wheeze, cough, or visible cyanosis.  No visible retractions or increased work of breathing.    SKIN: Visible skin clear. No significant rash, abnormal pigmentation or lesions.  NEURO: Cranial nerves grossly intact.  Mentation and speech appropriate for age.  PSYCH: Mentation appears normal, affect normal/bright,  judgement and insight intact, normal speech and appearance well-groomed.    Imaging: Arterial duplex and CTA revealed serial areas of disease in the superficial femoral artery on the right with a popliteal artery occlusion and what appears to be single-vessel runoff via the posterior tibial artery onto the foot.    Video Start Time: 1:30 PM  Video End Time: 2 PM        Sincerely,    Vanda Boyd MD

## 2022-08-03 NOTE — NURSING NOTE
Chief Complaint   Patient presents with     Consult     PAD       Patient confirms medications and allergies are accurate via patients echeck in completion, and or denies any changes since last reviewed/verified.       Love Kaur, Virtual Facilitator

## 2022-08-03 NOTE — PROGRESS NOTES
Vascular Surgery Consultation Note     Patient:  Aubrey Duncan   Date of birth 1955, Medical record number [id number]  Date of Visit:  08/03/2022  Consult Requester:Hoa Olivarez APRN *            Assessment and Recommendations:   ASSESSMENT / RECOMMENDATION:    Disabling right lower extremity claudication unresponsive to exercise therapy, medication and smoking cessation with appearance of right lower extremity popliteal acute thrombosis proximal to single vessel runoff. I have recommended an outpatient RLE arteriogram with possible intervention. He will remain on his aspirin and not stop it. We will hold his anticoagulation two days prior to the procedure. All questions answered. Encouraged him to reach out if additional questions arise.    Many thanks for involving me in the care of this very pleasant patient. Should any questions or concerns arise, please don't hesitate to contact me.    Warm Regards,    Vanda Boyd MD, DFSVS, RPVI  Director, [facility] Services  Professor and Chief, Vascular and Endovascular Surgery  [facility]  Pager: 1. Send message or 10 digit call back number Securely via [employer] with the Vocera Web Console (learn more here)              2. Outside of [facility]? Call [phone]        60 minutes spent on the date of the encounter doing chart review, review of outside records, review of test results, interpretation of tests, patient visit and documentation           HPI: Very pleasant 68 y/o double lung transplant patient with longstanding RLE claudication manageable that became severe in late July when he went to local ER with heat stroke and right calf pain. He felt he had traumatized right calf while working on his tractor and found the calf and foot to be cold to the touch when he went to local ER in June.  At that time he was apparently under resuscitated and required a significant amount of fluid.  He had a venous duplex at that time  showing no evidence of deep vein thrombosis.  His wife states that during the emergency room stay his right leg was painful and cold.  Eventually as he began his resuscitation things improved markedly and his right foot and below knee became more warm.  He subsequently was followed up at a later point and found to have deep vein thrombosis.  He then underwent ankle-brachial indices and a duplex revealing significant atherosclerotic disease, particularly with popliteal artery occlusion on the right.  A CT angiogram was performed and small PEs were noted in his transplanted lungs.  He was started on anticoagulation and is seeing me today.  He notes significant neuropathy with tingling and numbness on the bottom of his feet.  He denies rest pain or tissue loss.  Has been doing well with his transplanted lungs.  His wondering how long he needs to remain on anticoagulation particularly with regard to his lung transplant.      Review of Systems   Constitutional, HEENT, cardiovascular, pulmonary, GI, , musculoskeletal, neuro, skin, endocrine and psych systems are negative, except as otherwise noted.    Physical Exam   GENERAL: Healthy, alert and no distress  EYES: Eyes grossly normal to inspection.  No discharge or erythema, or obvious scleral/conjunctival abnormalities.  RESP: No audible wheeze, cough, or visible cyanosis.  No visible retractions or increased work of breathing.    SKIN: Visible skin clear. No significant rash, abnormal pigmentation or lesions.  NEURO: Cranial nerves grossly intact.  Mentation and speech appropriate for age.  PSYCH: Mentation appears normal, affect normal/bright, judgement and insight intact, normal speech and appearance well-groomed.    Imaging: Arterial duplex and CTA revealed serial areas of disease in the superficial femoral artery on the right with a popliteal artery occlusion and what appears to be single-vessel runoff via the posterior tibial artery onto the foot.    Video Start  Time: 1:30 PM  Video End Time: 2 PM          Aubrey is a 67 year old who is being evaluated via a billable video visit.      How would you like to obtain your AVS? MyChart  If the video visit is dropped, the invitation should be resent by: Text to cell phone: [phone]  Will anyone else be joining your video visit? Yes Kyung Wife   Patient states is currently in the [facility]: Yes        Originating Location (pt. Location): Home    Distant Location (provider location):  [facility] [place]     Platform used for Video Visit: Canal do Credito    .  Bina.

## 2022-08-04 ENCOUNTER — MYC MEDICAL ADVICE (OUTPATIENT)
Dept: FAMILY MEDICINE | Facility: OTHER | Age: 67
End: 2022-08-04

## 2022-08-04 RX ORDER — CEFAZOLIN SODIUM 2 G/50ML
2 SOLUTION INTRAVENOUS SEE ADMIN INSTRUCTIONS
Status: CANCELLED | OUTPATIENT
Start: 2022-08-04

## 2022-08-04 RX ORDER — CEFAZOLIN SODIUM 2 G/50ML
2 SOLUTION INTRAVENOUS
Status: CANCELLED | OUTPATIENT
Start: 2022-08-04

## 2022-08-04 NOTE — PATIENT INSTRUCTIONS
Thank you so much for choosing us for your care. It was a pleasure to see you at the vascular clinic today.     Follow-up recommendations: We will plan for surgery in the coming weeks. Our surgical scheduler Alyson will get in touch with you soon to determine a surgery date and will provide you with detailed pre-op instructions.      Additional testing/imaging ordered today: None      Our scheduling team will get in touch with you to set up any follow-up testing/imaging and/or appointments. Please be aware that any testing/imaging recommended today will need to completed prior to your next visit with the provider. If testing/imaging is not completed prior to your next visit, your visit may be rescheduled.        If you have any questions, please call Aidee Roy RN at [phone] or contact our clinic at [phone]. We also encourage the use of Beatsy to communicate with your HealthCare Provider.      If you have an urgent need after business hours (8:00 am to 4:30 pm) please call [phone], option 4, and ask for the vascular attending on call. For non-urgent after hours needs, please call the vascular nurse at [phone] and leave a detailed voicemail. For scheduling needs, please call our clinic directly at [phone].

## 2022-08-05 DIAGNOSIS — Z01.818 PRE-OP EXAM: ICD-10-CM

## 2022-08-05 DIAGNOSIS — D75.829 HIT (HEPARIN-INDUCED THROMBOCYTOPENIA) (H): Primary | ICD-10-CM

## 2022-08-08 ENCOUNTER — LAB (OUTPATIENT)
Dept: LAB | Facility: OTHER | Age: 67
End: 2022-08-08
Payer: MEDICARE

## 2022-08-08 DIAGNOSIS — D75.829 HIT (HEPARIN-INDUCED THROMBOCYTOPENIA) (H): ICD-10-CM

## 2022-08-08 DIAGNOSIS — Z01.818 PRE-OP EXAM: ICD-10-CM

## 2022-08-08 LAB
HOLD SPECIMEN: NORMAL
HOLD SPECIMEN: NORMAL

## 2022-08-08 PROCEDURE — 86022 PLATELET ANTIBODIES: CPT | Mod: ZL

## 2022-08-08 PROCEDURE — 36415 COLL VENOUS BLD VENIPUNCTURE: CPT | Mod: ZL

## 2022-08-09 LAB
HOLD SPECIMEN HIT: NORMAL
PF4 HEPARIN CMPLX AB SER QL: NEGATIVE

## 2022-08-11 DIAGNOSIS — Z94.2 LUNG REPLACED BY TRANSPLANT (H): Primary | ICD-10-CM

## 2022-08-12 ENCOUNTER — OFFICE VISIT (OUTPATIENT)
Dept: FAMILY MEDICINE | Facility: OTHER | Age: 67
End: 2022-08-12
Attending: NURSE PRACTITIONER
Payer: MEDICARE

## 2022-08-12 ENCOUNTER — TELEPHONE (OUTPATIENT)
Dept: TRANSPLANT | Facility: CLINIC | Age: 67
End: 2022-08-12

## 2022-08-12 ENCOUNTER — TELEPHONE (OUTPATIENT)
Dept: VASCULAR SURGERY | Facility: CLINIC | Age: 67
End: 2022-08-12

## 2022-08-12 VITALS
BODY MASS INDEX: 25.74 KG/M2 | SYSTOLIC BLOOD PRESSURE: 128 MMHG | TEMPERATURE: 96.9 F | WEIGHT: 164 LBS | RESPIRATION RATE: 20 BRPM | OXYGEN SATURATION: 97 % | DIASTOLIC BLOOD PRESSURE: 78 MMHG | HEIGHT: 67 IN | HEART RATE: 80 BPM

## 2022-08-12 DIAGNOSIS — E87.5 HYPERKALEMIA: ICD-10-CM

## 2022-08-12 DIAGNOSIS — J44.9 CHRONIC OBSTRUCTIVE PULMONARY DISEASE, UNSPECIFIED COPD TYPE (H): ICD-10-CM

## 2022-08-12 DIAGNOSIS — Z01.818 PREOP GENERAL PHYSICAL EXAM: Primary | ICD-10-CM

## 2022-08-12 DIAGNOSIS — Z94.2 LUNG REPLACED BY TRANSPLANT (H): ICD-10-CM

## 2022-08-12 DIAGNOSIS — Z79.4 TYPE 2 DIABETES MELLITUS WITH DIABETIC POLYNEUROPATHY, WITH LONG-TERM CURRENT USE OF INSULIN (H): ICD-10-CM

## 2022-08-12 DIAGNOSIS — E11.42 TYPE 2 DIABETES MELLITUS WITH DIABETIC POLYNEUROPATHY, WITH LONG-TERM CURRENT USE OF INSULIN (H): ICD-10-CM

## 2022-08-12 DIAGNOSIS — Z94.2 LUNG REPLACED BY TRANSPLANT (H): Primary | ICD-10-CM

## 2022-08-12 DIAGNOSIS — Z94.2 S/P LUNG TRANSPLANT (H): ICD-10-CM

## 2022-08-12 LAB
ALBUMIN SERPL-MCNC: 3.9 G/DL (ref 3.5–5.7)
ALP SERPL-CCNC: 34 U/L (ref 34–104)
ALT SERPL W P-5'-P-CCNC: 56 U/L (ref 7–52)
ANION GAP SERPL CALCULATED.3IONS-SCNC: 6 MMOL/L (ref 3–14)
AST SERPL W P-5'-P-CCNC: 40 U/L (ref 13–39)
BASOPHILS # BLD AUTO: 0 10E3/UL (ref 0–0.2)
BASOPHILS NFR BLD AUTO: 1 %
BILIRUB DIRECT SERPL-MCNC: 0.1 MG/DL (ref 0–0.2)
BILIRUB SERPL-MCNC: 0.3 MG/DL (ref 0.3–1)
BUN SERPL-MCNC: 41 MG/DL (ref 7–25)
CALCIUM SERPL-MCNC: 9.4 MG/DL (ref 8.6–10.3)
CHLORIDE BLD-SCNC: 106 MMOL/L (ref 98–107)
CO2 SERPL-SCNC: 26 MMOL/L (ref 21–31)
CREAT SERPL-MCNC: 1.29 MG/DL (ref 0.7–1.3)
EOSINOPHIL # BLD AUTO: 0 10E3/UL (ref 0–0.7)
EOSINOPHIL NFR BLD AUTO: 1 %
ERYTHROCYTE [DISTWIDTH] IN BLOOD BY AUTOMATED COUNT: 13.9 % (ref 10–15)
GFR SERPL CREATININE-BSD FRML MDRD: 60 ML/MIN/1.73M2
GLUCOSE BLD-MCNC: 96 MG/DL (ref 70–105)
HCT VFR BLD AUTO: 35.9 % (ref 40–53)
HGB BLD-MCNC: 11.7 G/DL (ref 13.3–17.7)
IMM GRANULOCYTES # BLD: 0.1 10E3/UL
IMM GRANULOCYTES NFR BLD: 1 %
LYMPHOCYTES # BLD AUTO: 0.9 10E3/UL (ref 0.8–5.3)
LYMPHOCYTES NFR BLD AUTO: 15 %
MAGNESIUM SERPL-MCNC: 1.7 MG/DL (ref 1.9–2.7)
MCH RBC QN AUTO: 34.5 PG (ref 26.5–33)
MCHC RBC AUTO-ENTMCNC: 32.6 G/DL (ref 31.5–36.5)
MCV RBC AUTO: 106 FL (ref 78–100)
MONOCYTES # BLD AUTO: 0.4 10E3/UL (ref 0–1.3)
MONOCYTES NFR BLD AUTO: 6 %
NEUTROPHILS # BLD AUTO: 4.8 10E3/UL (ref 1.6–8.3)
NEUTROPHILS NFR BLD AUTO: 76 %
NRBC # BLD AUTO: 0 10E3/UL
NRBC BLD AUTO-RTO: 0 /100
PLATELET # BLD AUTO: 189 10E3/UL (ref 150–450)
POTASSIUM BLD-SCNC: 5.6 MMOL/L (ref 3.5–5.1)
PROT SERPL-MCNC: 6.6 G/DL (ref 6.4–8.9)
RBC # BLD AUTO: 3.39 10E6/UL (ref 4.4–5.9)
SODIUM SERPL-SCNC: 138 MMOL/L (ref 134–144)
WBC # BLD AUTO: 6.2 10E3/UL (ref 4–11)

## 2022-08-12 PROCEDURE — 36415 COLL VENOUS BLD VENIPUNCTURE: CPT | Mod: ZL | Performed by: NURSE PRACTITIONER

## 2022-08-12 PROCEDURE — 93005 ELECTROCARDIOGRAM TRACING: CPT | Performed by: NURSE PRACTITIONER

## 2022-08-12 PROCEDURE — 82248 BILIRUBIN DIRECT: CPT | Mod: ZL | Performed by: NURSE PRACTITIONER

## 2022-08-12 PROCEDURE — 93010 ELECTROCARDIOGRAM REPORT: CPT | Performed by: INTERNAL MEDICINE

## 2022-08-12 PROCEDURE — 85025 COMPLETE CBC W/AUTO DIFF WBC: CPT | Mod: ZL | Performed by: NURSE PRACTITIONER

## 2022-08-12 PROCEDURE — G0463 HOSPITAL OUTPT CLINIC VISIT: HCPCS

## 2022-08-12 PROCEDURE — 80197 ASSAY OF TACROLIMUS: CPT | Mod: ZL | Performed by: NURSE PRACTITIONER

## 2022-08-12 PROCEDURE — 87799 DETECT AGENT NOS DNA QUANT: CPT | Mod: ZL | Performed by: NURSE PRACTITIONER

## 2022-08-12 PROCEDURE — 82310 ASSAY OF CALCIUM: CPT | Mod: ZL | Performed by: NURSE PRACTITIONER

## 2022-08-12 PROCEDURE — 99214 OFFICE O/P EST MOD 30 MIN: CPT | Performed by: NURSE PRACTITIONER

## 2022-08-12 PROCEDURE — 83735 ASSAY OF MAGNESIUM: CPT | Mod: ZL | Performed by: NURSE PRACTITIONER

## 2022-08-12 RX ORDER — SODIUM POLYSTYRENE SULFONATE 15 G/60ML
30 SUSPENSION ORAL; RECTAL ONCE
Qty: 120 ML | Refills: 0 | Status: SHIPPED | OUTPATIENT
Start: 2022-08-12 | End: 2022-08-12

## 2022-08-12 ASSESSMENT — PAIN SCALES - GENERAL: PAINLEVEL: MILD PAIN (2)

## 2022-08-12 NOTE — PATIENT INSTRUCTIONS
Preparing for Your Surgery  Getting started  A nurse will call you to review your health history and instructions. They will give you an arrival time based on your scheduled surgery time. Please be ready to share:    Your doctor's clinic name and phone number    Your medical, surgical and anesthesia history    A list of allergies and sensitivities    A list of medicines, including herbal treatments and over-the-counter drugs    Whether the patient has a legal guardian (ask how to send us the papers in advance)  Please tell us if you're pregnant--or if there's any chance you might be pregnant. Some surgeries may injure a fetus (unborn baby), so they require a pregnancy test. Surgeries that are safe for a fetus don't always need a test, and you can choose whether to have one.   If you have a child who's having surgery, please ask for a copy of Preparing for Your Child's Surgery.    Preparing for surgery    Within 30 days of surgery: Have a pre-op exam (sometimes called an H&P, or History and Physical). This can be done at a clinic or pre-operative center.  ? If you're having a , you may not need this exam. Talk to your care team.    At your pre-op exam, talk to your care team about all medicines you take. If you need to stop any medicines before surgery, ask when to start taking them again.  ? We do this for your safety. Many medicines can make you bleed too much during surgery. Some change how well surgery (anesthesia) drugs work.    Call your insurance company to let them know you're having surgery. (If you don't have insurance, call [phone].)    Call your clinic if there's any change in your health. This includes signs of a cold or flu (sore throat, runny nose, cough, rash, fever). It also includes a scrape or scratch near the surgery site.    If you have questions on the day of surgery, call your hospital or surgery center.  COVID testing  You may need to be tested for COVID-19 before having  surgery. If so, we will give you instructions.  Eating and drinking guidelines  For your safety: Unless your surgeon tells you otherwise, follow the guidelines below.    Eat and drink as usual until 8 hours before surgery. After that, no food or milk.    Drink clear liquids until 2 hours before surgery. These are liquids you can see through, like water, Gatorade and Propel Water. You may also have black coffee and tea (no cream or milk).    Nothing by mouth within 2 hours of surgery. This includes gum, candy and breath mints.    If you drink alcohol: Stop drinking it the night before surgery.    If your care team tells you to take medicine on the morning of surgery, it's okay to take it with a sip of water.  Preventing infection    Shower or bathe the night before and morning of your surgery. Follow the instructions your clinic gave you. (If no instructions, use regular soap.)    Don't shave or clip hair near your surgery site. We'll remove the hair if needed.    Don't smoke or vape the morning of surgery. You may chew nicotine gum up to 2 hours before surgery. A nicotine patch is okay.  ? Note: Some surgeries require you to completely quit smoking and nicotine. Check with your surgeon.    Your care team will make every effort to keep you safe from infection. We will:  ? Clean our hands often with soap and water (or an alcohol-based hand rub).  ? Clean the skin at your surgery site with a special soap that kills germs.  ? Give you a special gown to keep you warm. (Cold raises the risk of infection.)  ? Wear special hair covers, masks, gowns and gloves during surgery.  ? Give antibiotic medicine, if prescribed. Not all surgeries need antibiotics.  What to bring on the day of surgery    Photo ID and insurance card    Copy of your health care directive, if you have one    Glasses and hearing aides (bring cases)  ? You can't wear contacts during surgery    Inhaler and eye drops, if you use them (tell us about these when  you arrive)    CPAP machine or breathing device, if you use them    A few personal items, if spending the night    If you have . . .  ? A pacemaker, ICD (cardiac defibrillator) or other implant: Bring the ID card.  ? An implanted stimulator: Bring the remote control.  ? A legal guardian: Bring a copy of the certified (court-stamped) guardianship papers.  Please remove any jewelry, including body piercings. Leave jewelry and other valuables at home.  If you're going home the day of surgery    You must have a responsible adult drive you home. They should stay with you overnight as well.    If you don't have someone to stay with you, and you aren't safe to go home alone, we may keep you overnight. Insurance often won't pay for this.  After surgery  If it's hard to control your pain or you need more pain medicine, please call your surgeon's office.  Questions?   If you have any questions for your care team, list them here: _________________________________________________________________________________________________________________________________________________________________________ ____________________________________ ____________________________________ ____________________________________  For informational purposes only. Not to replace the advice of your health care provider. Copyright   2003, 2019 [facility]. All rights reserved. Clinically reviewed by Alexandra Morris MD. [place] [id number] - REV 07/21.
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
## 2022-08-12 NOTE — NURSING NOTE
Patient presents today for pre op exam.    Medication Reconciliation Complete    Yohana Barreto LPN  8/12/2022 1:39 PM

## 2022-08-12 NOTE — TELEPHONE ENCOUNTER
K+ 5.6 on 8/12.  Provider notified.  Pt asymptomatic.  Plan for 30 g kayexalate.  Repeat BMP on Monday.  Pt verbalized understanding of plan and will go to local ER if he starts to have heart palpitations, shortness of breath, chest pain, or N/V.

## 2022-08-12 NOTE — TELEPHONE ENCOUNTER
[facility] Call Center    Phone Message    May a detailed message be left on voicemail: yes     Reason for Call: Appointment Intake    Referring Provider Name: NA  Diagnosis and/or Symptoms: PAD (peripheral artery disease)  Pt was suppose to get a call back from Yohana to see if there was a cancellation. Pt was told he may be coming in Tuesday 08/16. Pt would need to know ASAP so he is able to stop a Rx. Please call pt back ASAP. I was unable to reach anyone in clinic to help. Thanks     Action Taken: Message routed to:  [facility] VS    Travel Screening: Not Applicable

## 2022-08-13 LAB
TACROLIMUS BLD-MCNC: 8.5 UG/L (ref 5–15)
TME LAST DOSE: NORMAL H
TME LAST DOSE: NORMAL H

## 2022-08-14 LAB — CMV DNA SPEC NAA+PROBE-ACNC: NOT DETECTED IU/ML

## 2022-08-15 ENCOUNTER — MYC MEDICAL ADVICE (OUTPATIENT)
Dept: FAMILY MEDICINE | Facility: OTHER | Age: 67
End: 2022-08-15

## 2022-08-15 ENCOUNTER — TELEPHONE (OUTPATIENT)
Dept: VASCULAR SURGERY | Facility: CLINIC | Age: 67
End: 2022-08-15

## 2022-08-15 ENCOUNTER — TELEPHONE (OUTPATIENT)
Dept: TRANSPLANT | Facility: CLINIC | Age: 67
End: 2022-08-15

## 2022-08-15 ENCOUNTER — ANESTHESIA EVENT (OUTPATIENT)
Dept: SURGERY | Facility: CLINIC | Age: 67
End: 2022-08-15
Payer: MEDICARE

## 2022-08-15 ENCOUNTER — PREP FOR PROCEDURE (OUTPATIENT)
Dept: VASCULAR SURGERY | Facility: CLINIC | Age: 67
End: 2022-08-15

## 2022-08-15 DIAGNOSIS — Z94.2 LUNG REPLACED BY TRANSPLANT (H): Primary | ICD-10-CM

## 2022-08-15 DIAGNOSIS — Z98.890 POSTOPERATIVE STATE: ICD-10-CM

## 2022-08-15 DIAGNOSIS — I73.9 PAD (PERIPHERAL ARTERY DISEASE) (H): Primary | ICD-10-CM

## 2022-08-15 LAB
ATRIAL RATE - MUSE: 70 BPM
DIASTOLIC BLOOD PRESSURE - MUSE: NORMAL MMHG
EBV DNA COPIES/ML, INSTRUMENT: 953 COPIES/ML
EBV DNA SPEC NAA+PROBE-LOG#: 3 {LOG_COPIES}/ML
INTERPRETATION ECG - MUSE: NORMAL
P AXIS - MUSE: 19 DEGREES
PR INTERVAL - MUSE: 158 MS
QRS DURATION - MUSE: 76 MS
QT - MUSE: 410 MS
QTC - MUSE: 442 MS
R AXIS - MUSE: -23 DEGREES
SYSTOLIC BLOOD PRESSURE - MUSE: NORMAL MMHG
T AXIS - MUSE: 94 DEGREES
VENTRICULAR RATE- MUSE: 70 BPM

## 2022-08-15 NOTE — RESULT ENCOUNTER NOTE
EBV improved from 7,484 (log 3.9) on 5/26/22 to 953 (log 3.0) on 8/12/22.  Will continue to monitor EBV.

## 2022-08-15 NOTE — TELEPHONE ENCOUNTER
Spoke with patient to schedule procedure with Dr. Vanda Boyd    Procedure was scheduled on 08/16 at [facility] OR  Patient will have H&P with Hoa Olivarez (PCP)     Patient is aware a COVID-19 test is needed before their procedure. The test should be with-in 4 days of their procedure.   Patient completed a home test    PO Visit scheduled on:     4-6 week 09/21 with MD    Patient is aware a / is needed day of surgery.   Surgery letter was sent via [employer], patient has my direct contact information for any further questions.     Vendor requested, no    Note:   Called patient this morning, he had not taken his Eliquis yet and he also forgot to take it yesterday.

## 2022-08-15 NOTE — RESULT ENCOUNTER NOTE
Tacrolimus level 8.5 at 5.5 hours, on 8/12/22.  Goal 8-10.   Current dose 1.5 mg in AM, 1.5 mg in PM    Not accurate 12 hour level.  No dose change.  Plan to recheck level with next set of labs.    [employer] message sent

## 2022-08-15 NOTE — TELEPHONE ENCOUNTER
Lab appt made for pt at [facility] waiting room at [facility] prior to his procedure on 8/16.  Plan to repeat K+.  Discussed with pt's wife who verbalized understanding.

## 2022-08-15 NOTE — TELEPHONE ENCOUNTER
Pt has been contacted by surgery schedulers.    DURGA Gurrola, RN  [facility] - [facility]  Ph: [phone]  Fax: [phone]

## 2022-08-16 ENCOUNTER — APPOINTMENT (OUTPATIENT)
Dept: INTERVENTIONAL RADIOLOGY/VASCULAR | Facility: CLINIC | Age: 67
End: 2022-08-16
Attending: SURGERY
Payer: MEDICARE

## 2022-08-16 ENCOUNTER — ANESTHESIA (OUTPATIENT)
Dept: SURGERY | Facility: CLINIC | Age: 67
End: 2022-08-16
Payer: MEDICARE

## 2022-08-16 ENCOUNTER — HOSPITAL ENCOUNTER (OUTPATIENT)
Facility: CLINIC | Age: 67
Discharge: HOME | End: 2022-08-16
Attending: SURGERY | Admitting: SURGERY
Payer: MEDICARE

## 2022-08-16 ENCOUNTER — MYC MEDICAL ADVICE (OUTPATIENT)
Dept: FAMILY MEDICINE | Facility: OTHER | Age: 67
End: 2022-08-16

## 2022-08-16 VITALS
TEMPERATURE: 98 F | HEIGHT: 68 IN | SYSTOLIC BLOOD PRESSURE: 138 MMHG | BODY MASS INDEX: 24.32 KG/M2 | DIASTOLIC BLOOD PRESSURE: 71 MMHG | HEART RATE: 72 BPM | OXYGEN SATURATION: 99 % | RESPIRATION RATE: 12 BRPM | WEIGHT: 160.5 LBS

## 2022-08-16 DIAGNOSIS — I73.9 PAD (PERIPHERAL ARTERY DISEASE) (H): ICD-10-CM

## 2022-08-16 DIAGNOSIS — I26.99 ACUTE PULMONARY EMBOLISM, UNSPECIFIED PULMONARY EMBOLISM TYPE, UNSPECIFIED WHETHER ACUTE COR PULMONALE PRESENT (H): ICD-10-CM

## 2022-08-16 DIAGNOSIS — Z94.2 S/P LUNG TRANSPLANT (H): Primary | ICD-10-CM

## 2022-08-16 DIAGNOSIS — I70.219 INTERMITTENT CLAUDICATION DUE TO ATHEROSCLEROSIS OF ARTERY OF EXTREMITY (H): ICD-10-CM

## 2022-08-16 DIAGNOSIS — I82.409 DVT (DEEP VENOUS THROMBOSIS) (H): ICD-10-CM

## 2022-08-16 DIAGNOSIS — Z94.2 S/P LUNG TRANSPLANT (H): ICD-10-CM

## 2022-08-16 LAB
ANION GAP SERPL CALCULATED.3IONS-SCNC: 9 MMOL/L (ref 7–15)
BUN SERPL-MCNC: 38 MG/DL (ref 8–23)
CALCIUM SERPL-MCNC: 9.1 MG/DL (ref 8.8–10.2)
CHLORIDE SERPL-SCNC: 108 MMOL/L (ref 98–107)
CREAT SERPL-MCNC: 1.05 MG/DL (ref 0.67–1.17)
DEPRECATED HCO3 PLAS-SCNC: 23 MMOL/L (ref 22–29)
ERYTHROCYTE [DISTWIDTH] IN BLOOD BY AUTOMATED COUNT: 14.1 % (ref 10–15)
GFR SERPL CREATININE-BSD FRML MDRD: 77 ML/MIN/1.73M2
GLUCOSE BLDC GLUCOMTR-MCNC: 90 MG/DL (ref 70–99)
GLUCOSE BLDC GLUCOMTR-MCNC: 95 MG/DL (ref 70–99)
GLUCOSE SERPL-MCNC: 87 MG/DL (ref 70–99)
HCT VFR BLD AUTO: 35.2 % (ref 40–53)
HGB BLD-MCNC: 11.3 G/DL (ref 13.3–17.7)
MCH RBC QN AUTO: 34.1 PG (ref 26.5–33)
MCHC RBC AUTO-ENTMCNC: 32.1 G/DL (ref 31.5–36.5)
MCV RBC AUTO: 106 FL (ref 78–100)
PLATELET # BLD AUTO: 167 10E3/UL (ref 150–450)
POTASSIUM SERPL-SCNC: 4.4 MMOL/L (ref 3.4–5.3)
RBC # BLD AUTO: 3.31 10E6/UL (ref 4.4–5.9)
SODIUM SERPL-SCNC: 140 MMOL/L (ref 136–145)
WBC # BLD AUTO: 7 10E3/UL (ref 4–11)

## 2022-08-16 PROCEDURE — 255N000002 HC RX 255 OP 636: Performed by: SURGERY

## 2022-08-16 PROCEDURE — C1887 CATHETER, GUIDING: HCPCS | Performed by: SURGERY

## 2022-08-16 PROCEDURE — 370N000017 HC ANESTHESIA TECHNICAL FEE, PER MIN: Performed by: SURGERY

## 2022-08-16 PROCEDURE — 250N000011 HC RX IP 250 OP 636: Performed by: NURSE ANESTHETIST, CERTIFIED REGISTERED

## 2022-08-16 PROCEDURE — 76499 UNLISTED DX RADIOGRAPHIC PX: CPT

## 2022-08-16 PROCEDURE — C1894 INTRO/SHEATH, NON-LASER: HCPCS | Performed by: SURGERY

## 2022-08-16 PROCEDURE — 999N000083 HC STATISTIC IR STAFF TIME IN THE OR

## 2022-08-16 PROCEDURE — 272N000001 HC OR GENERAL SUPPLY STERILE: Performed by: SURGERY

## 2022-08-16 PROCEDURE — 258N000003 HC RX IP 258 OP 636: Performed by: NURSE ANESTHETIST, CERTIFIED REGISTERED

## 2022-08-16 PROCEDURE — 85014 HEMATOCRIT: CPT | Performed by: SURGERY

## 2022-08-16 PROCEDURE — 76937 US GUIDE VASCULAR ACCESS: CPT | Mod: 26 | Performed by: SURGERY

## 2022-08-16 PROCEDURE — 82962 GLUCOSE BLOOD TEST: CPT

## 2022-08-16 PROCEDURE — 250N000011 HC RX IP 250 OP 636: Performed by: SURGERY

## 2022-08-16 PROCEDURE — 36247 INS CATH ABD/L-EXT ART 3RD: CPT | Mod: RT | Performed by: SURGERY

## 2022-08-16 PROCEDURE — 999N000141 HC STATISTIC PRE-PROCEDURE NURSING ASSESSMENT: Performed by: SURGERY

## 2022-08-16 PROCEDURE — C1769 GUIDE WIRE: HCPCS | Performed by: SURGERY

## 2022-08-16 PROCEDURE — 250N000013 HC RX MED GY IP 250 OP 250 PS 637

## 2022-08-16 PROCEDURE — 710N000010 HC RECOVERY PHASE 1, LEVEL 2, PER MIN: Performed by: SURGERY

## 2022-08-16 PROCEDURE — 36415 COLL VENOUS BLD VENIPUNCTURE: CPT | Performed by: SURGERY

## 2022-08-16 PROCEDURE — C1760 CLOSURE DEV, VASC: HCPCS | Performed by: SURGERY

## 2022-08-16 PROCEDURE — 75710 ARTERY X-RAYS ARM/LEG: CPT

## 2022-08-16 PROCEDURE — 80048 BASIC METABOLIC PNL TOTAL CA: CPT | Performed by: SURGERY

## 2022-08-16 PROCEDURE — 710N000012 HC RECOVERY PHASE 2, PER MINUTE: Performed by: SURGERY

## 2022-08-16 PROCEDURE — 360N000075 HC SURGERY LEVEL 2, PER MIN: Performed by: SURGERY

## 2022-08-16 PROCEDURE — 250N000009 HC RX 250: Performed by: SURGERY

## 2022-08-16 RX ORDER — PROPOFOL 10 MG/ML
INJECTION, EMULSION INTRAVENOUS CONTINUOUS PRN
Status: DISCONTINUED | OUTPATIENT
Start: 2022-08-16 | End: 2022-08-16

## 2022-08-16 RX ORDER — CLOPIDOGREL BISULFATE 75 MG/1
75 TABLET ORAL DAILY
Qty: 90 TABLET | Refills: 1 | Status: SHIPPED | OUTPATIENT
Start: 2022-08-16 | End: 2022-09-22

## 2022-08-16 RX ORDER — IODIXANOL 320 MG/ML
INJECTION, SOLUTION INTRAVASCULAR PRN
Status: DISCONTINUED | OUTPATIENT
Start: 2022-08-16 | End: 2022-08-16 | Stop reason: HOSPADM

## 2022-08-16 RX ORDER — ONDANSETRON 2 MG/ML
4 INJECTION INTRAMUSCULAR; INTRAVENOUS EVERY 30 MIN PRN
Status: DISCONTINUED | OUTPATIENT
Start: 2022-08-16 | End: 2022-08-16 | Stop reason: HOSPADM

## 2022-08-16 RX ORDER — PROPOFOL 10 MG/ML
INJECTION, EMULSION INTRAVENOUS PRN
Status: DISCONTINUED | OUTPATIENT
Start: 2022-08-16 | End: 2022-08-16

## 2022-08-16 RX ORDER — SODIUM CHLORIDE 9 MG/ML
INJECTION, SOLUTION INTRAVENOUS CONTINUOUS
Status: DISCONTINUED | OUTPATIENT
Start: 2022-08-16 | End: 2022-08-16 | Stop reason: HOSPADM

## 2022-08-16 RX ORDER — IODIXANOL 320 MG/ML
100 INJECTION, SOLUTION INTRAVASCULAR ONCE
Status: DISCONTINUED | OUTPATIENT
Start: 2022-08-16 | End: 2022-08-16 | Stop reason: HOSPADM

## 2022-08-16 RX ORDER — SODIUM CHLORIDE, SODIUM LACTATE, POTASSIUM CHLORIDE, CALCIUM CHLORIDE 600; 310; 30; 20 MG/100ML; MG/100ML; MG/100ML; MG/100ML
INJECTION, SOLUTION INTRAVENOUS CONTINUOUS PRN
Status: DISCONTINUED | OUTPATIENT
Start: 2022-08-16 | End: 2022-08-16

## 2022-08-16 RX ORDER — LIDOCAINE HYDROCHLORIDE 10 MG/ML
INJECTION, SOLUTION EPIDURAL; INFILTRATION; INTRACAUDAL; PERINEURAL PRN
Status: DISCONTINUED | OUTPATIENT
Start: 2022-08-16 | End: 2022-08-16 | Stop reason: HOSPADM

## 2022-08-16 RX ORDER — CEFAZOLIN SODIUM/WATER 2 G/20 ML
2 SYRINGE (ML) INTRAVENOUS
Status: COMPLETED | OUTPATIENT
Start: 2022-08-16 | End: 2022-08-16

## 2022-08-16 RX ORDER — ONDANSETRON 4 MG/1
4 TABLET, ORALLY DISINTEGRATING ORAL EVERY 30 MIN PRN
Status: DISCONTINUED | OUTPATIENT
Start: 2022-08-16 | End: 2022-08-16 | Stop reason: HOSPADM

## 2022-08-16 RX ORDER — ARGATROBAN 1 MG/ML
350 INJECTION, SOLUTION INTRAVENOUS ONCE
Status: DISCONTINUED | OUTPATIENT
Start: 2022-08-16 | End: 2022-08-16 | Stop reason: HOSPADM

## 2022-08-16 RX ORDER — CEFAZOLIN SODIUM/WATER 2 G/20 ML
2 SYRINGE (ML) INTRAVENOUS SEE ADMIN INSTRUCTIONS
Status: DISCONTINUED | OUTPATIENT
Start: 2022-08-16 | End: 2022-08-16 | Stop reason: HOSPADM

## 2022-08-16 RX ORDER — FENTANYL CITRATE 50 UG/ML
25 INJECTION, SOLUTION INTRAMUSCULAR; INTRAVENOUS
Status: DISCONTINUED | OUTPATIENT
Start: 2022-08-16 | End: 2022-08-16 | Stop reason: HOSPADM

## 2022-08-16 RX ORDER — SODIUM CHLORIDE, SODIUM LACTATE, POTASSIUM CHLORIDE, CALCIUM CHLORIDE 600; 310; 30; 20 MG/100ML; MG/100ML; MG/100ML; MG/100ML
INJECTION, SOLUTION INTRAVENOUS CONTINUOUS
Status: DISCONTINUED | OUTPATIENT
Start: 2022-08-16 | End: 2022-08-16 | Stop reason: HOSPADM

## 2022-08-16 RX ORDER — FENTANYL CITRATE 50 UG/ML
25 INJECTION, SOLUTION INTRAMUSCULAR; INTRAVENOUS EVERY 5 MIN PRN
Status: DISCONTINUED | OUTPATIENT
Start: 2022-08-16 | End: 2022-08-16 | Stop reason: HOSPADM

## 2022-08-16 RX ORDER — ACETAMINOPHEN 325 MG/1
650 TABLET ORAL EVERY 6 HOURS PRN
Qty: 60 TABLET | Refills: 0 | Status: SHIPPED | OUTPATIENT
Start: 2022-08-16

## 2022-08-16 RX ORDER — ACETAMINOPHEN 325 MG/1
650 TABLET ORAL EVERY 6 HOURS PRN
Status: DISCONTINUED | OUTPATIENT
Start: 2022-08-16 | End: 2022-08-16 | Stop reason: HOSPADM

## 2022-08-16 RX ORDER — HYDROMORPHONE HCL IN WATER/PF 6 MG/30 ML
0.2 PATIENT CONTROLLED ANALGESIA SYRINGE INTRAVENOUS EVERY 5 MIN PRN
Status: DISCONTINUED | OUTPATIENT
Start: 2022-08-16 | End: 2022-08-16 | Stop reason: HOSPADM

## 2022-08-16 RX ADMIN — PROPOFOL 20 MG: 10 INJECTION, EMULSION INTRAVENOUS at 12:46

## 2022-08-16 RX ADMIN — Medication 2 G: at 11:30

## 2022-08-16 RX ADMIN — ACETAMINOPHEN 650 MG: 325 TABLET ORAL at 16:58

## 2022-08-16 RX ADMIN — PROPOFOL 20 MG: 10 INJECTION, EMULSION INTRAVENOUS at 12:54

## 2022-08-16 RX ADMIN — PROPOFOL 100 MCG/KG/MIN: 10 INJECTION, EMULSION INTRAVENOUS at 11:56

## 2022-08-16 RX ADMIN — PROPOFOL 30 MG: 10 INJECTION, EMULSION INTRAVENOUS at 12:25

## 2022-08-16 RX ADMIN — SODIUM CHLORIDE, POTASSIUM CHLORIDE, SODIUM LACTATE AND CALCIUM CHLORIDE: 600; 310; 30; 20 INJECTION, SOLUTION INTRAVENOUS at 11:13

## 2022-08-16 RX ADMIN — PROPOFOL 30 MG: 10 INJECTION, EMULSION INTRAVENOUS at 12:02

## 2022-08-16 ASSESSMENT — ACTIVITIES OF DAILY LIVING (ADL)
ADLS_ACUITY_SCORE: 35

## 2022-08-16 ASSESSMENT — COPD QUESTIONNAIRES: COPD: 1

## 2022-08-16 NOTE — BRIEF OP NOTE
[facility]    Brief Operative Note    Pre-operative diagnosis: PAD (peripheral artery disease) (H) [I73.9]  S/P lung transplant (H) [Z94.2]  Intermittent claudication due 2to atherosclerosis of artery of extremity (H) [I70.219]  Post-operative diagnosis Same as pre-operative diagnosis    Procedure: Procedure(s):  Right lower extremity arteriogram,   Surgeon: Surgeon(s) and Role:     * Vanda Boyd MD - Primary   Burke De La Cruz - Resident, assisting  Anesthesia: MAC with Local   Estimated Blood Loss: Minimal    Drains: None  Specimens: * No specimens in log *  Findings:   Occluded left mid popliteal artery. at P2 segment. Patent L SFA and above knee popliteal, occluded profunda, extensive collaterals to the foot.   Complications: None.  Implants: * No implants in log *   Dose: 59 mGy

## 2022-08-16 NOTE — ANESTHESIA POSTPROCEDURE EVALUATION
Patient: Aubrey Duncan    Procedure: Procedure(s):  Right lower extremity arteriogram       Anesthesia Type:  MAC    Note:  Disposition: Outpatient   Postop Pain Control: Uneventful            Sign Out: Well controlled pain   PONV: No   Neuro/Psych: Uneventful            Sign Out: Acceptable/Baseline neuro status   Airway/Respiratory: Uneventful            Sign Out: Acceptable/Baseline resp. status   CV/Hemodynamics: Uneventful            Sign Out: Acceptable CV status; No obvious hypovolemia; No obvious fluid overload   Other NRE: NONE   DID A NON-ROUTINE EVENT OCCUR? No           Last vitals:  Vitals Value Taken Time   /71 08/16/22 1330   Temp 35.8  C (96.4  F) 08/16/22 1325   Pulse 67 08/16/22 1340   Resp 20 08/16/22 1340   SpO2 98 % 08/16/22 1340   Vitals shown include unvalidated device data.    Electronically Signed By: Sahil Castro MD  August 16, 2022  1:41 PM
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
## 2022-08-16 NOTE — ANESTHESIA CARE TRANSFER NOTE
Patient: Aubrey Duncan    Procedure: Procedure(s):  Right lower extremity arteriogram       Diagnosis: PAD (peripheral artery disease) (H) [I73.9]  S/P lung transplant (H) [Z94.2]  Intermittent claudication due to atherosclerosis of artery of extremity (H) [I70.219]  Diagnosis Additional Information: No value filed.    Anesthesia Type:   MAC     Note:    Oropharynx: oropharynx clear of all foreign objects and spontaneously breathing  Level of Consciousness: awake  Oxygen Supplementation: room air    Independent Airway: airway patency satisfactory and stable  Dentition: dentition unchanged  Vital Signs Stable: post-procedure vital signs reviewed and stable  Report to RN Given: handoff report given  Patient transferred to: PACU    Handoff Report: Identifed the Patient, Identified the Reponsible Provider, Reviewed the pertinent medical history, Discussed the surgical course, Reviewed Intra-OP anesthesia mangement and issues during anesthesia, Set expectations for post-procedure period and Allowed opportunity for questions and acknowledgement of understanding      Vitals:  Vitals Value Taken Time   /71 08/16/22 1330   Temp 36    Pulse 63 08/16/22 1332   Resp 20 08/16/22 1332   SpO2 98 % 08/16/22 1332   Vitals shown include unvalidated device data.    Electronically Signed By: BRANDI Burris CRNA  August 16, 2022  1:33 PM

## 2022-08-16 NOTE — DISCHARGE INSTRUCTIONS
Pain management:   Taking tylenol and ibuprofen (unless MD has told you not to use one of these) on a regular or scheduled basis for the first few days after surgery will be helpful.   You can alternate between tylenol and ibuprofen.   For example, each medication can be taken every 6 hours so you can alternate taking one then the other every 3 hours for continued pain coverage   I.e. ibuprofen at 1200, tylenol at 3pm, ibuprofen at 6pm, etc.   Do not take more than 4,000 mg of tylenol in 24 hours.   We DO expect you to have discomfort/pain at the groin site that progressively gets better and is manageable with tylenol and ibuprofen.  Doing other pain management cares such as rest, ice or heat, and distraction will also help you manage the pain.   If your pain is suddenly worse or not tolerable with this pain plan, please call Dr. Boyd or the hospital phone number provided below.  ____________________________________________________________________________    [facility] // Same-Day Surgery // Adult Discharge Orders & Instructions     Take it easy when you get home.  Remember, same day surgery DOES NOT MEAN SAME DAY RECOVERY! Healing is a gradual process.   You will need some time to recover- you may be more tired than you realize at first.  Rest and relax for at least the first 24 hours at home.  You'll feel better and heal faster if you take good care of yourself.     ANESTHESIA RECOVERY -   For 24 hours after surgery    Get plenty of rest.  A responsible adult must stay with you for at least 24 hours after you leave the hospital.   Do not drive or use heavy equipment.  If you have weakness or tingling, don't drive or use heavy equipment until this feeling goes away.  Do not drink alcohol.  Avoid strenuous or risky activities.  Ask for help when climbing stairs.   You may feel lightheaded.  IF so, sit for a few minutes before standing.  Have someone help you get up.   If you  have nausea (feel sick to your stomach): Drink only clear liquids such as apple juice, ginger ale, broth or 7-Up.  Rest may also help.  Be sure to drink enough fluids.  Move to a regular diet as you feel able.  You may have a slight fever. Call the doctor if your fever is over 100 F (37.7 C) (taken under the tongue) or lasts longer than 24 hours.  You may have a dry mouth, a sore throat, muscle aches or trouble sleeping.  These should go away after 24 hours.  Do not make important or legal decisions.     Call your doctor for any of the followin.  Signs of infection (fever, growing tenderness at the surgery site, a large amount of drainage or bleeding, severe pain, foul-smelling drainage, redness, swelling).  2. It has been over 8 to 10 hours since surgery and you are still not able to urinate (pass water).  3.  Headache for over 24 hours.    To contact a doctor, call:  [facility] at [phone] and ask for the resident on call for peripheral vascular(answered 24 hours a day)  Emergency Department: [facility]: [phone] (TTY for hearing impaired: [phone])

## 2022-08-18 ENCOUNTER — TELEPHONE (OUTPATIENT)
Dept: VASCULAR SURGERY | Facility: CLINIC | Age: 67
End: 2022-08-18

## 2022-08-18 ENCOUNTER — HOSPITAL ENCOUNTER (EMERGENCY)
Facility: OTHER | Age: 67
Discharge: HOME | End: 2022-08-18
Attending: FAMILY MEDICINE | Admitting: FAMILY MEDICINE
Payer: MEDICARE

## 2022-08-18 VITALS
OXYGEN SATURATION: 97 % | BODY MASS INDEX: 24.25 KG/M2 | RESPIRATION RATE: 18 BRPM | SYSTOLIC BLOOD PRESSURE: 161 MMHG | HEIGHT: 68 IN | DIASTOLIC BLOOD PRESSURE: 85 MMHG | HEART RATE: 84 BPM | TEMPERATURE: 97.6 F | WEIGHT: 160 LBS

## 2022-08-18 DIAGNOSIS — R58 POSTOPERATIVE ECCHYMOSIS: ICD-10-CM

## 2022-08-18 PROCEDURE — 99282 EMERGENCY DEPT VISIT SF MDM: CPT | Performed by: FAMILY MEDICINE

## 2022-08-18 RX ORDER — OXYCODONE HYDROCHLORIDE 5 MG/1
5 TABLET ORAL EVERY 4 HOURS PRN
Status: CANCELLED | OUTPATIENT
Start: 2022-08-18

## 2022-08-18 RX ORDER — ACETAMINOPHEN 325 MG/1
650 TABLET ORAL EVERY 6 HOURS PRN
Status: CANCELLED | OUTPATIENT
Start: 2022-08-18

## 2022-08-18 RX ORDER — SODIUM CHLORIDE 9 MG/ML
INJECTION, SOLUTION INTRAVENOUS CONTINUOUS
Status: CANCELLED | OUTPATIENT
Start: 2022-08-18

## 2022-08-18 ASSESSMENT — ENCOUNTER SYMPTOMS: COLOR CHANGE: 1

## 2022-08-18 NOTE — TELEPHONE ENCOUNTER
"Spoke with Aubrey regarding how pt is doing s/p RLE angio.    Pt reports their pain is minimal. He describes some \"soreness\" around his groin site, but reports it is very minor. The incision is WNL besides a small amount of drainage noted overnight. Pt does report bruising around the site. Pt reports they are eating and drinking w/o difficulty. Pt is voiding and has had a bowel movement.    VQI measures: Pt has been prescribed Eliquis, Plavix, and rosuvastatin and reports they are taking it as prescribed. His PCP has refilled his Eliquis.    Confirmed follow up appointments and provided callback number for further questions/concerns.    DURGA Gurrola, RN  [facility] - P Vascular Surgery  Ph: [phone]  Fax: [phone]    "
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
## 2022-08-19 NOTE — ED TRIAGE NOTES
Pt states that on Tuesday he had a left groin angioplasty at  in the [facility].  Pt states the site is leaking and started yesterday and is leaking more and more.  Pt denies pain just the normal soreness.  Pt is on blood thinners.       Triage Assessment     Row Name 08/18/22 [id number]       Triage Assessment (Adult)    Airway WDL WDL       Respiratory WDL    Respiratory WDL WDL       Cardiac WDL    Cardiac WDL WDL       Peripheral/Neurovascular WDL    Peripheral Neurovascular WDL WDL

## 2022-08-19 NOTE — DISCHARGE INSTRUCTIONS
Aubrey    Bruising after angiogram, especially while you are on Plavix and Eliquis, is normal.  I think this will improve with time.     Thank you for choosing our Emergency Department for your care.     You may receive a phone call or letter for a survey about your care in our ED.  Please complete this as this is how we improve care for our patients.     If you have any questions after leaving the ED you can call or text me on my cell phone at [phone].    Sincerely,    Dr Dieudonne Roy M.D.

## 2022-08-20 ENCOUNTER — HEALTH MAINTENANCE LETTER (OUTPATIENT)
Age: 67
End: 2022-08-20

## 2022-08-24 ENCOUNTER — LAB (OUTPATIENT)
Dept: LAB | Facility: OTHER | Age: 67
End: 2022-08-24
Attending: INTERNAL MEDICINE
Payer: MEDICARE

## 2022-08-24 ENCOUNTER — HOSPITAL ENCOUNTER (OUTPATIENT)
Dept: RESPIRATORY THERAPY | Facility: OTHER | Age: 67
Discharge: HOME | End: 2022-08-24
Attending: INTERNAL MEDICINE
Payer: MEDICARE

## 2022-08-24 DIAGNOSIS — Z94.2 LUNG REPLACED BY TRANSPLANT (H): ICD-10-CM

## 2022-08-24 LAB
ANION GAP SERPL CALCULATED.3IONS-SCNC: 6 MMOL/L (ref 3–14)
BUN SERPL-MCNC: 39 MG/DL (ref 7–25)
CALCIUM SERPL-MCNC: 8.8 MG/DL (ref 8.6–10.3)
CHLORIDE BLD-SCNC: 111 MMOL/L (ref 98–107)
CO2 SERPL-SCNC: 25 MMOL/L (ref 21–31)
CREAT SERPL-MCNC: 1.29 MG/DL (ref 0.7–1.3)
GFR SERPL CREATININE-BSD FRML MDRD: 60 ML/MIN/1.73M2
GLUCOSE BLD-MCNC: 82 MG/DL (ref 70–105)
POTASSIUM BLD-SCNC: 5 MMOL/L (ref 3.5–5.1)
SODIUM SERPL-SCNC: 142 MMOL/L (ref 134–144)

## 2022-08-24 PROCEDURE — 94729 DIFFUSING CAPACITY: CPT

## 2022-08-24 PROCEDURE — 94010 BREATHING CAPACITY TEST: CPT

## 2022-08-24 PROCEDURE — 80197 ASSAY OF TACROLIMUS: CPT | Mod: ZL

## 2022-08-24 PROCEDURE — 80048 BASIC METABOLIC PNL TOTAL CA: CPT | Mod: ZL

## 2022-08-24 PROCEDURE — 94010 BREATHING CAPACITY TEST: CPT | Mod: 26 | Performed by: INTERNAL MEDICINE

## 2022-08-24 PROCEDURE — 36415 COLL VENOUS BLD VENIPUNCTURE: CPT | Mod: ZL

## 2022-08-24 PROCEDURE — 999N000157 HC STATISTIC RCP TIME EA 10 MIN

## 2022-08-24 PROCEDURE — 94726 PLETHYSMOGRAPHY LUNG VOLUMES: CPT | Mod: 26 | Performed by: INTERNAL MEDICINE

## 2022-08-24 PROCEDURE — 94726 PLETHYSMOGRAPHY LUNG VOLUMES: CPT

## 2022-08-24 PROCEDURE — 94729 DIFFUSING CAPACITY: CPT | Mod: 26 | Performed by: INTERNAL MEDICINE

## 2022-08-25 ENCOUNTER — TELEPHONE (OUTPATIENT)
Dept: TRANSPLANT | Facility: CLINIC | Age: 67
End: 2022-08-25

## 2022-08-25 DIAGNOSIS — Z94.2 LUNG REPLACED BY TRANSPLANT (H): Primary | ICD-10-CM

## 2022-08-25 LAB
TACROLIMUS BLD-MCNC: 5.1 UG/L (ref 5–15)
TME LAST DOSE: NORMAL H
TME LAST DOSE: NORMAL H

## 2022-08-25 NOTE — TELEPHONE ENCOUNTER
Tacrolimus level 5.1 at 12 hours, on 8/24/22.  Goal 8-10.   Current dose 1.5 mg in AM, 1.5 mg in PM    Dose changed to 1.5 mg in AM, 2 mg in PM   Recheck level in 7-10 days.    Discussed with pt.

## 2022-09-08 DIAGNOSIS — Z94.2 LUNG REPLACED BY TRANSPLANT (H): Primary | ICD-10-CM

## 2022-09-16 ENCOUNTER — LAB (OUTPATIENT)
Dept: LAB | Facility: OTHER | Age: 67
End: 2022-09-16
Attending: INTERNAL MEDICINE
Payer: MEDICARE

## 2022-09-16 ENCOUNTER — TELEPHONE (OUTPATIENT)
Dept: TRANSPLANT | Facility: CLINIC | Age: 67
End: 2022-09-16

## 2022-09-16 DIAGNOSIS — Z94.2 S/P LUNG TRANSPLANT (H): ICD-10-CM

## 2022-09-16 DIAGNOSIS — Z94.2 LUNG REPLACED BY TRANSPLANT (H): Primary | ICD-10-CM

## 2022-09-16 DIAGNOSIS — E87.5 HYPERKALEMIA: ICD-10-CM

## 2022-09-16 DIAGNOSIS — Z94.2 LUNG REPLACED BY TRANSPLANT (H): ICD-10-CM

## 2022-09-16 LAB
ANION GAP SERPL CALCULATED.3IONS-SCNC: 7 MMOL/L (ref 3–14)
BUN SERPL-MCNC: 34 MG/DL (ref 7–25)
CALCIUM SERPL-MCNC: 9.7 MG/DL (ref 8.6–10.3)
CHLORIDE BLD-SCNC: 109 MMOL/L (ref 98–107)
CO2 SERPL-SCNC: 27 MMOL/L (ref 21–31)
CREAT SERPL-MCNC: 1.15 MG/DL (ref 0.7–1.3)
GFR SERPL CREATININE-BSD FRML MDRD: 69 ML/MIN/1.73M2
GLUCOSE BLD-MCNC: 83 MG/DL (ref 70–105)
POTASSIUM BLD-SCNC: 5.7 MMOL/L (ref 3.5–5.1)
SODIUM SERPL-SCNC: 143 MMOL/L (ref 134–144)

## 2022-09-16 PROCEDURE — 36415 COLL VENOUS BLD VENIPUNCTURE: CPT | Mod: ZL

## 2022-09-16 PROCEDURE — 82310 ASSAY OF CALCIUM: CPT | Mod: ZL

## 2022-09-16 PROCEDURE — 80197 ASSAY OF TACROLIMUS: CPT | Mod: ZL

## 2022-09-16 PROCEDURE — 87799 DETECT AGENT NOS DNA QUANT: CPT | Mod: ZL

## 2022-09-16 RX ORDER — SODIUM POLYSTYRENE SULFONATE 15 G/60ML
30 SUSPENSION ORAL; RECTAL ONCE
Qty: 120 ML | Refills: 0 | Status: SHIPPED | OUTPATIENT
Start: 2022-09-16 | End: 2022-09-16

## 2022-09-16 RX ORDER — SODIUM POLYSTYRENE SULFONATE 15 G/60ML
15 SUSPENSION ORAL; RECTAL ONCE
Qty: 60 ML | Refills: 0 | Status: SHIPPED | OUTPATIENT
Start: 2022-09-16 | End: 2022-09-16

## 2022-09-16 NOTE — TELEPHONE ENCOUNTER
K+ 5.7 on 9/16.  Provider notified.  Plan for kayexalate 30 g and repeat labs.  Pt updated of plan.  Denies chest pain, palpitations, shortness of breath, or N/V.  No change in medications recently.  Pt states that he's been eating lots of potatoes and fresh vegetables recently.  Encouraged pt to avoid foods high in potassium for now.

## 2022-09-17 LAB
TACROLIMUS BLD-MCNC: 6.4 UG/L (ref 5–15)
TME LAST DOSE: NORMAL H
TME LAST DOSE: NORMAL H

## 2022-09-19 ENCOUNTER — LAB (OUTPATIENT)
Dept: LAB | Facility: CLINIC | Age: 67
End: 2022-09-19

## 2022-09-19 ENCOUNTER — TELEPHONE (OUTPATIENT)
Dept: TRANSPLANT | Facility: CLINIC | Age: 67
End: 2022-09-19

## 2022-09-19 ENCOUNTER — OFFICE VISIT (OUTPATIENT)
Dept: PODIATRY | Facility: CLINIC | Age: 67
End: 2022-09-19
Attending: INTERNAL MEDICINE
Payer: MEDICARE

## 2022-09-19 DIAGNOSIS — E87.5 HYPERKALEMIA: ICD-10-CM

## 2022-09-19 DIAGNOSIS — R23.4 SKIN FISSURE: ICD-10-CM

## 2022-09-19 DIAGNOSIS — Z94.2 LUNG REPLACED BY TRANSPLANT (H): Primary | ICD-10-CM

## 2022-09-19 DIAGNOSIS — E11.51 DIABETES MELLITUS WITH PERIPHERAL VASCULAR DISEASE (H): Primary | ICD-10-CM

## 2022-09-19 DIAGNOSIS — Z94.2 LUNG REPLACED BY TRANSPLANT (H): ICD-10-CM

## 2022-09-19 DIAGNOSIS — E11.49 TYPE II OR UNSPECIFIED TYPE DIABETES MELLITUS WITH NEUROLOGICAL MANIFESTATIONS, NOT STATED AS UNCONTROLLED(250.60) (H): ICD-10-CM

## 2022-09-19 LAB
ANION GAP SERPL CALCULATED.3IONS-SCNC: 3 MMOL/L (ref 3–14)
BUN SERPL-MCNC: 38 MG/DL (ref 7–30)
CALCIUM SERPL-MCNC: 9.4 MG/DL (ref 8.5–10.1)
CHLORIDE BLD-SCNC: 112 MMOL/L (ref 94–109)
CO2 SERPL-SCNC: 27 MMOL/L (ref 20–32)
CREAT SERPL-MCNC: 1.13 MG/DL (ref 0.66–1.25)
EBV DNA COPIES/ML, INSTRUMENT: 3729 COPIES/ML
EBV DNA SPEC NAA+PROBE-LOG#: 3.6 {LOG_COPIES}/ML
GFR SERPL CREATININE-BSD FRML MDRD: 70 ML/MIN/1.73M2
GLUCOSE BLD-MCNC: 92 MG/DL (ref 70–99)
POTASSIUM BLD-SCNC: 5.1 MMOL/L (ref 3.4–5.3)
SODIUM SERPL-SCNC: 142 MMOL/L (ref 133–144)

## 2022-09-19 PROCEDURE — 99204 OFFICE O/P NEW MOD 45 MIN: CPT | Performed by: PODIATRIST

## 2022-09-19 PROCEDURE — 80048 BASIC METABOLIC PNL TOTAL CA: CPT

## 2022-09-19 PROCEDURE — 36415 COLL VENOUS BLD VENIPUNCTURE: CPT

## 2022-09-19 RX ORDER — LIDOCAINE 40 MG/G
CREAM TOPICAL
Qty: 30 G | Refills: 2 | Status: SHIPPED | OUTPATIENT
Start: 2022-09-19 | End: 2023-02-16

## 2022-09-19 NOTE — RESULT ENCOUNTER NOTE
K+ 5.7 on 9/16.  Pt received kayexalate.  Repeat K+ 5.1.  Will continue to monitor K+ and pt will monitor his potassium intake.

## 2022-09-19 NOTE — TELEPHONE ENCOUNTER
Tacrolimus level 6.4 at 12.25 hours, on 9/16/22.  Goal 8-10.   Current dose 1.5 mg in AM, 2 mg in PM    Dose changed to 2 mg in AM, 2 mg in PM   Recheck level in 7-10 days.    Discussed with pt.

## 2022-09-19 NOTE — NURSING NOTE
"Aubrey Duncan's chief complaint for this visit includes:  Chief Complaint   Patient presents with     Consult     Right foot ulcer.  Patient is diabetic     PCP: Hoa Olivarez    Referring Provider:  Alma Murphy MD  [address] [place] [id number]    There were no vitals taken for this visit.  Data Unavailable        Allergies   Allergen Reactions     Heparin Other (See Comments)     HIT positive and AUGUST positive    No Heparin Antibody Identified on 8/15 blood test     Oxycodone Other (See Comments)     Significant lethargy, confusion. Tolerates dilaudid well.      Fluocinolone Other (See Comments)     Tendon problems       Levaquin Muscle Pain (Myalgia)     Pneumococcal Vaccine Other (See Comments)     Other reaction(s): Fever  \"My arm swelled up like a balloon.\"     Varicella Zoster Immune Globulin Swelling         Do you need any medication refills at today's visit?      "
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
## 2022-09-20 ENCOUNTER — HOSPITAL ENCOUNTER (OUTPATIENT)
Dept: ULTRASOUND IMAGING | Facility: OTHER | Age: 67
Discharge: HOME | End: 2022-09-20
Attending: SURGERY | Admitting: SURGERY
Payer: MEDICARE

## 2022-09-20 DIAGNOSIS — Z98.890 POSTOPERATIVE STATE: ICD-10-CM

## 2022-09-20 DIAGNOSIS — I73.9 PAD (PERIPHERAL ARTERY DISEASE) (H): ICD-10-CM

## 2022-09-20 PROCEDURE — 93922 UPR/L XTREMITY ART 2 LEVELS: CPT

## 2022-09-21 ENCOUNTER — VIRTUAL VISIT (OUTPATIENT)
Dept: VASCULAR SURGERY | Facility: CLINIC | Age: 67
End: 2022-09-21
Payer: MEDICARE

## 2022-09-21 DIAGNOSIS — I73.9 CLAUDICATION (H): ICD-10-CM

## 2022-09-21 DIAGNOSIS — I70.219 INTERMITTENT CLAUDICATION DUE TO ATHEROSCLEROSIS OF ARTERY OF EXTREMITY (H): ICD-10-CM

## 2022-09-21 DIAGNOSIS — I73.9 PAD (PERIPHERAL ARTERY DISEASE) (H): Primary | ICD-10-CM

## 2022-09-21 PROCEDURE — 99215 OFFICE O/P EST HI 40 MIN: CPT | Mod: 95 | Performed by: SURGERY

## 2022-09-21 NOTE — LETTER
9/21/2022       RE: Aubrey Duncan  [address]  [address] [place] [id number]  [facility] [id number]         Dear Colleague,    Thank you for referring your patient, Aubrey Duncan, to the [facility] at [facility]. Please see a copy of my visit note below.      Vascular Surgery Consultation Note     Patient:  Aubrey Duncan   Date of birth 1955, Medical record number [id number]  Date of Visit:  09/21/2022  Consult Requester:No att. providers found            Assessment and Recommendations:   ASSESSMENT / RECOMMENDATION:    Very pleasant 67-year-old male with no unreconstructable peripheral vascular disease below the knee.  I encouraged him to take great care with his feet as there are no revascularization options percutaneously or open from a vascular standpoint.  Walking will help maintain to improve his collateral flow.  At this time we can discontinue his Plavix and go to aspirin 81 mg daily along with his anticoagulation for his pulmonary embolism.  I reached out to Dr. Mahin Martínez regarding the lung transplant and need for ongoing anticoagulation.  He will reach out to the transplant pulmonary team with hopes they can get back to Mr. Duncan for determining length of time for anticoagulation.  We will see him back in 6 months for follow-up.    Many thanks for involving me in the care of this very pleasant patient. Should any questions or concerns arise, please don't hesitate to contact me.    Warm Regards,    Vanda Boyd MD, DFSVS, RPVI  Director, [facility]  Professor and Chief, Vascular and Endovascular Surgery  [facility]  Pager: 1. Send message or 10 digit call back number Securely via Layer3 TV with the Vocera Web Console (learn more here)              2. Outside of [facility]? Call [phone]        45 minutes spent on the date of the encounter doing chart review, review of outside  records, review of test results, interpretation of tests, patient visit, documentation and discussion with other provider(s)           HPI: Joined today on video virtual visit with his wife for follow up of his RLE PAD. Underwent a diagnostic arteriogram which revealed no hemodynamically significant disease until the popliteal artery then no named vessels below knee with a well-developed collateral network. Plavix and baseline anticoagulation for his asymptomatic PE along with statin therapy have been continued since arteriogram and tolerated well. I reached out to providers in lung transplant pulmonary but have not yet heard back regarding long-tem need for anticoagulation in a transplanted lung. He notes some tender cracks in the right heel which he is managing with his podiatrist. Neuropathy persists bilaterally.      Review of Systems   Constitutional, HEENT, cardiovascular, pulmonary, gi and gu systems are negative, except as otherwise noted.    Physical Exam   GENERAL: Healthy, alert and no distress  EYES: Eyes grossly normal to inspection.  No discharge or erythema, or obvious scleral/conjunctival abnormalities.  RESP: No audible wheeze, cough, or visible cyanosis.  No visible retractions or increased work of breathing.    SKIN: Visible skin clear. No significant rash, abnormal pigmentation or lesions.  NEURO: Cranial nerves grossly intact.  Mentation and speech appropriate for age.  PSYCH: Mentation appears normal, affect normal/bright, judgement and insight intact, normal speech and appearance well-groomed.    Imaging:         Video Start Time: 930  Video End Time: 1000      Aubrey is a 67 year old who is being evaluated via a billable video visit.      How would you like to obtain your AVS? MyChart  If the video visit is dropped, the invitation should be resent by: Send to e-mail at: [email or web address]  Will anyone else be joining your video visit?     Jass Monte, Visit Facilitator/MA.          Again,  thank you for allowing me to participate in the care of your patient.      Sincerely,    Vanda Boyd MD

## 2022-09-21 NOTE — NURSING NOTE
Chief Complaint   Patient presents with     Follow Up     4 week post op, no other updates per pt's wife. Medications/allergies reviewed with pt, no changes.      Patient denies any changes since echeck-in regarding medication and allergies and states all information entered during echeck-in remains accurate.    Jass Monte, Visit Facilitator/MA.

## 2022-09-26 ENCOUNTER — MYC MEDICAL ADVICE (OUTPATIENT)
Dept: PODIATRY | Facility: CLINIC | Age: 67
End: 2022-09-26

## 2022-09-27 DIAGNOSIS — Z94.2 LUNG REPLACED BY TRANSPLANT (H): ICD-10-CM

## 2022-09-29 DIAGNOSIS — Z94.2 LUNG REPLACED BY TRANSPLANT (H): ICD-10-CM

## 2022-09-29 RX ORDER — MONTELUKAST SODIUM 10 MG/1
10 TABLET ORAL EVERY EVENING
Qty: 90 TABLET | Refills: 3 | Status: SHIPPED | OUTPATIENT
Start: 2022-09-29 | End: 2023-11-24

## 2022-09-29 RX ORDER — MONTELUKAST SODIUM 10 MG/1
10 TABLET ORAL EVERY EVENING
Qty: 90 TABLET | Refills: 3 | Status: SHIPPED | OUTPATIENT
Start: 2022-09-29 | End: 2022-11-17

## 2022-09-30 ENCOUNTER — TELEPHONE (OUTPATIENT)
Dept: TRANSPLANT | Facility: CLINIC | Age: 67
End: 2022-09-30

## 2022-09-30 NOTE — TELEPHONE ENCOUNTER
General  Route to LPN    Reason for call: Aubrey was advised by Dr. Boyd to find out how long he should be taking plavix and when a CT scan would be scheduled to check the status of his blood clots.    Call back needed? Yes  Return Call Needed  Same as documented in contacts section  When to return call?: Greater than one day: Route standard priority   Patient with dadDavid. Patient present for left shoulder pain.

## 2022-09-30 NOTE — TELEPHONE ENCOUNTER
Returned pt's phone call.  Plan for pt to have repeat imaging completed on 10/10, when he is at the [facility] for another appointment.  Plan for virtual visit on 10/11 to discuss options and plan moving forward.  Pt verbalized understanding of plan and will call with further questions or concerns.

## 2022-09-30 NOTE — PROGRESS NOTES
Assessment & Plan   Problem List Items Addressed This Visit     None      Visit Diagnoses     Lower respiratory infection (e.g., bronchitis, pneumonia, pneumonitis, pulmonitis)    -  Primary    Relevant Medications    azithromycin (ZITHROMAX) 250 MG tablet      Lower respiratory function concerning especially given his immunocompromise status with history of lung transplant.  Will treat with azithromycin daily for 5 days.  Follow-up if symptoms worsen or not improving.    No follow-ups on file.    BRANDI Manning [facility]    Albert Burgos is a 66 year old who presents for the following health issues     History of Present Illness     Reason for visit:  Cold , sinus infection   Symptom onset:  3-7 days ago  Symptoms include:  Cough, runny nose  Symptom intensity:  Mild  Symptom progression:  Worsening  Had these symptoms before:  No  What makes it worse:  Unknown  What makes it better:  Unknown    He eats 2-3 servings of fruits and vegetables daily.He consumes 2 sweetened beverage(s) daily.He exercises with enough effort to increase his heart rate 30 to 60 minutes per day.  He exercises with enough effort to increase his heart rate 7 days per week.   He is taking medications regularly.       Acute Illness  Acute illness concerns: ear pain  Onset/Duration: one week  Symptoms:  Fever: no  Chills/Sweats: no  Headache (location?): no  Sinus Pressure: YES  Conjunctivitis:  no  Ear Pain: YES: right  Rhinorrhea: no  Congestion: YES  Sore Throat: scratchy  Cough: YES-productive of yellow sputum  Wheeze: no  Decreased Appetite: no  Nausea: no  Vomiting: no  Diarrhea: no  Dysuria/Freq.: no  Dysuria or Hematuria: no  Fatigue/Achiness: no  Sick/Strep Exposure: no  Therapies tried and outcome: None      He reports symptoms started on Wednesday with ear pain, scratchy throat, sinus congestion and cough.  Chest congestion feels to have slightly worsened but overall he is feeling somewhat  better.  Cough is productive.  Denies any shortness of breath.  Blood sugars have been stable.  Using Tylenol and Vicks.    Review of Systems   See above      Objective    /70   Pulse 80   Temp 98.1  F (36.7  C)   Resp 16   Wt 74.8 kg (165 lb)   SpO2 99%   BMI 25.84 kg/m    Body mass index is 25.84 kg/m .  Physical Exam   GENERAL: healthy, alert and no distress  EYES: Eyes grossly normal to inspection, PERRL and conjunctivae and sclerae normal  HENT: ear canals and TM's normal, nose and mouth without ulcers or lesions  NECK: no adenopathy, no asymmetry, masses, or scars and thyroid normal to palpation  RESP: lungs clear to auscultation - no rales, rhonchi or wheezes.  O2 sats 90% on room air, frequent cough.  CV: regular rate and rhythm, normal S1 S2, no S3 or S4, no murmur, click or rub, no peripheral edema and peripheral pulses strong  PSYCH: mentation appears normal, affect normal/bright         I attest my time as PA/NP is greater than 50% of the total combined time spent on qualifying patient care activities by the PA/NP and attending.

## 2022-10-04 ENCOUNTER — TELEPHONE (OUTPATIENT)
Dept: TRANSPLANT | Facility: CLINIC | Age: 67
End: 2022-10-04

## 2022-10-04 ENCOUNTER — LAB (OUTPATIENT)
Dept: LAB | Facility: OTHER | Age: 67
End: 2022-10-04
Attending: INTERNAL MEDICINE
Payer: MEDICARE

## 2022-10-04 DIAGNOSIS — N18.32 CHRONIC KIDNEY DISEASE, STAGE 3B (H): ICD-10-CM

## 2022-10-04 DIAGNOSIS — Z79.4 TYPE 2 DIABETES MELLITUS WITH DIABETIC POLYNEUROPATHY, WITH LONG-TERM CURRENT USE OF INSULIN (H): ICD-10-CM

## 2022-10-04 DIAGNOSIS — Z94.2 LUNG REPLACED BY TRANSPLANT (H): ICD-10-CM

## 2022-10-04 DIAGNOSIS — Z94.2 S/P LUNG TRANSPLANT (H): ICD-10-CM

## 2022-10-04 DIAGNOSIS — E11.42 TYPE 2 DIABETES MELLITUS WITH DIABETIC POLYNEUROPATHY, WITH LONG-TERM CURRENT USE OF INSULIN (H): ICD-10-CM

## 2022-10-04 DIAGNOSIS — E87.5 HYPERKALEMIA: ICD-10-CM

## 2022-10-04 DIAGNOSIS — Z94.2 LUNG REPLACED BY TRANSPLANT (H): Primary | ICD-10-CM

## 2022-10-04 LAB
ANION GAP SERPL CALCULATED.3IONS-SCNC: 6 MMOL/L (ref 3–14)
BUN SERPL-MCNC: 42 MG/DL (ref 7–25)
CALCIUM SERPL-MCNC: 9.3 MG/DL (ref 8.6–10.3)
CHLORIDE BLD-SCNC: 110 MMOL/L (ref 98–107)
CO2 SERPL-SCNC: 24 MMOL/L (ref 21–31)
CREAT SERPL-MCNC: 1.3 MG/DL (ref 0.7–1.3)
CREAT UR-MCNC: 71.1 MG/DL
GFR SERPL CREATININE-BSD FRML MDRD: 59 ML/MIN/1.73M2
GLUCOSE BLD-MCNC: 88 MG/DL (ref 70–105)
MICROALBUMIN UR-MCNC: <12 MG/L
MICROALBUMIN/CREAT UR: NORMAL MG/G{CREAT}
POTASSIUM BLD-SCNC: 5.5 MMOL/L (ref 3.5–5.1)
SODIUM SERPL-SCNC: 140 MMOL/L (ref 134–144)
TACROLIMUS BLD-MCNC: 8.4 UG/L (ref 5–15)
TME LAST DOSE: NORMAL H
TME LAST DOSE: NORMAL H

## 2022-10-04 PROCEDURE — 82043 UR ALBUMIN QUANTITATIVE: CPT | Mod: ZL

## 2022-10-04 PROCEDURE — 36415 COLL VENOUS BLD VENIPUNCTURE: CPT | Mod: ZL

## 2022-10-04 PROCEDURE — 80197 ASSAY OF TACROLIMUS: CPT | Mod: ZL

## 2022-10-04 PROCEDURE — 80048 BASIC METABOLIC PNL TOTAL CA: CPT | Mod: ZL

## 2022-10-04 PROCEDURE — 87799 DETECT AGENT NOS DNA QUANT: CPT | Mod: ZL

## 2022-10-04 NOTE — TELEPHONE ENCOUNTER
K+ 5.5 on 10/4.  Provider notified.  Plan for pt to switch from bactrim to dapsone or atovaquone.  Plan to check G6PD and start dapsone if able.  Plan for pt to start florinef 0.1 mg daily.  Pt to monitor BPs BID after starting medication.

## 2022-10-05 LAB
EBV DNA COPIES/ML, INSTRUMENT: 2850 COPIES/ML
EBV DNA SPEC NAA+PROBE-LOG#: 3.5 {LOG_COPIES}/ML

## 2022-10-05 RX ORDER — FLUDROCORTISONE ACETATE 0.1 MG/1
0.1 TABLET ORAL DAILY
Qty: 90 TABLET | Refills: 3 | Status: SHIPPED | OUTPATIENT
Start: 2022-10-05 | End: 2023-10-09

## 2022-10-05 NOTE — RESULT ENCOUNTER NOTE
Tacrolimus level 8.4 at 12.5 hours, on 10/4/22.  Goal 8-10.   Current dose 2 mg in AM, 2 mg in PM    Level at goal.  No dose change.    Diabetica message sent

## 2022-10-06 DIAGNOSIS — Z94.2 LUNG REPLACED BY TRANSPLANT (H): Primary | ICD-10-CM

## 2022-10-06 DIAGNOSIS — Z79.899 OTHER LONG TERM (CURRENT) DRUG THERAPY: ICD-10-CM

## 2022-10-06 DIAGNOSIS — I73.9 PAD (PERIPHERAL ARTERY DISEASE) (H): ICD-10-CM

## 2022-10-10 ENCOUNTER — OFFICE VISIT (OUTPATIENT)
Dept: ORTHOPEDICS | Facility: CLINIC | Age: 67
End: 2022-10-10
Payer: MEDICARE

## 2022-10-10 ENCOUNTER — LAB (OUTPATIENT)
Dept: LAB | Facility: CLINIC | Age: 67
End: 2022-10-10
Payer: MEDICARE

## 2022-10-10 ENCOUNTER — ANCILLARY PROCEDURE (OUTPATIENT)
Dept: ULTRASOUND IMAGING | Facility: CLINIC | Age: 67
End: 2022-10-10
Attending: INTERNAL MEDICINE
Payer: MEDICARE

## 2022-10-10 DIAGNOSIS — Z79.899 OTHER LONG TERM (CURRENT) DRUG THERAPY: ICD-10-CM

## 2022-10-10 DIAGNOSIS — R23.4 SKIN FISSURE: ICD-10-CM

## 2022-10-10 DIAGNOSIS — Z94.2 LUNG REPLACED BY TRANSPLANT (H): ICD-10-CM

## 2022-10-10 DIAGNOSIS — I73.9 PAD (PERIPHERAL ARTERY DISEASE) (H): ICD-10-CM

## 2022-10-10 DIAGNOSIS — E11.49 TYPE II OR UNSPECIFIED TYPE DIABETES MELLITUS WITH NEUROLOGICAL MANIFESTATIONS, NOT STATED AS UNCONTROLLED(250.60) (H): ICD-10-CM

## 2022-10-10 DIAGNOSIS — B35.3 TINEA PEDIS OF BOTH FEET: Primary | ICD-10-CM

## 2022-10-10 DIAGNOSIS — E11.51 DIABETES MELLITUS WITH PERIPHERAL VASCULAR DISEASE (H): ICD-10-CM

## 2022-10-10 PROCEDURE — 36415 COLL VENOUS BLD VENIPUNCTURE: CPT | Performed by: PATHOLOGY

## 2022-10-10 PROCEDURE — 99214 OFFICE O/P EST MOD 30 MIN: CPT | Performed by: PODIATRIST

## 2022-10-10 PROCEDURE — 82955 ASSAY OF G6PD ENZYME: CPT | Mod: 90 | Performed by: PATHOLOGY

## 2022-10-10 PROCEDURE — 99000 SPECIMEN HANDLING OFFICE-LAB: CPT | Performed by: PATHOLOGY

## 2022-10-10 PROCEDURE — 93925 LOWER EXTREMITY STUDY: CPT | Performed by: RADIOLOGY

## 2022-10-10 RX ORDER — CICLOPIROX OLAMINE 7.7 MG/G
CREAM TOPICAL 2 TIMES DAILY
Qty: 90 G | Refills: 3 | Status: SHIPPED | OUTPATIENT
Start: 2022-10-10 | End: 2022-10-12

## 2022-10-10 NOTE — PROGRESS NOTES
Aubrey Duncan is a 67 year old male who is being evaluated via a billable video visit. 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      1. S/p bilateral lung function: no new pulmonary complaints, isn't very active secondary to his neuropathy and a wound on his right heel. Denies change in dyspnea or new cough. Per notes, PFTs began having a decline in function between 5/2021-7/2021 with overall decline in FVC and FEV1 by about 500 mL. Chest CT on 12/9/21 with small airways air trapping on expiration suggestive of CLAD. DSA 6/3/21 negative and CMV 8/12 negative. PFTs in August at OSH were stable from his May values and FEV1 is 82% of his post transplant best. No CXR or DSA today.    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  - Insulin lantus 23U, aspart as needed     3. 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 Will get updated labs this week.     4. H/o SCC of left forearm: s/p Mohs 6/2016. Seen by Derm in [place] on 2/1/22, follows regularly. Did not address today.      5. HTN: BP remains high, also notes increase in dry cough that possibly corresponds to increase in lisinopril.  - Increase amlodipine to 10 mg daily; decrease lisinopril back to 5 mg daily  - Metoprolol 25 mg bid     6. Hyperlipidemia: prior decrease in rosuvastatin 20 mg MWF.  - CMP later this week per  below     7. Mild ALT/AST elevation: in the past related to medications, unsure at this point if it may be on the downtrend after acute gastroenteritis, wonder if he had a mild hepatitis. No alcohol use or excessive tylenol use.   - CMP this week  - If elevated again, will likely send for US Abd and consider stopping statin    RTC: as scheduled  Vaccinations: received flu shot and 4th covid vaccine on 9/22; Evusheld due > 11/26  Prevenative: DEXA > 11/4/22; follows with Derm per above    Starr Puentes PA-C    Interval history: no new or unexpected shortness of breath, walks around a little bit, but not very physically active. Has a sore on his right heel, saw Dr. Rosales yesterday, so that limits his walking. Walking also limited by neuropathy, R>L. No fever or chills, no recent illnesses. Does have a cough, not new, more clearing of the throat in the morning, wife feels like it's more frequent. No O2 use. No new chest pain or palpitations. Bloating and gas related to what he eats, BM are good, does take imodium for the diarrhea.     BP this am: 161/77 (always that high in the morning), HR 77, T 97.9    GENERAL: Healthy, alert and no distress  EYES: Eyes grossly normal to inspection.  No discharge or erythema, or obvious scleral/conjunctival abnormalities.  RESP: No audible wheeze, cough, or visible cyanosis.  No visible retractions or increased work of breathing.    SKIN: Visible skin clear. No significant rash, abnormal pigmentation or lesions.  NEURO: Cranial nerves grossly intact.  Mentation and speech appropriate for age.  PSYCH: Mentation appears normal, affect normal/bright, judgement and insight intact, normal speech and appearance well-groomed.    How would you like to obtain your AVS? KeraNet[employer]hart  If the video visit is dropped, the invitation should be resent by: Text to cell phone: [phone]  Will anyone else be joining your video visit? No        Video-Visit Details    Video Start Time: 2:12    Type of  service:  Video Visit    Video End Time: 2:45    Originating Location (pt. Location): Home    Distant Location (provider location):  [facility]     Platform used for Video Visit: Kin is a 67 year old who is being evaluated via a billable video visit.      How would you like to obtain your AVS? The [employer]oun Projecthart
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
## 2022-10-10 NOTE — NURSING NOTE
"Reason For Visit:   Chief Complaint   Patient presents with     Follow Up     1 month follow up. Diabetic. Skin fissures, right heel- Patient relates that this has not heeled.-Patient's wife is concerned with some discoloration today. Aching and throbbing pain last night.        Pain Assessment  Patient Currently in Pain: Yes  Primary Pain Location: Foot (Right.)        Allergies   Allergen Reactions     Heparin Other (See Comments)     HIT positive and AUGUST positive    No Heparin Antibody Identified on 8/15 blood test     Oxycodone Other (See Comments)     Significant lethargy, confusion. Tolerates dilaudid well.      Fluocinolone Other (See Comments)     Tendon problems       Levaquin Muscle Pain (Myalgia)     Pneumococcal Vaccine Other (See Comments)     Other reaction(s): Fever  \"My arm swelled up like a balloon.\"     Varicella Zoster Immune Globulin Swelling           Lalita Gtz LPN    "
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
## 2022-10-11 ENCOUNTER — VIRTUAL VISIT (OUTPATIENT)
Dept: PULMONOLOGY | Facility: CLINIC | Age: 67
End: 2022-10-11
Attending: INTERNAL MEDICINE
Payer: MEDICARE

## 2022-10-11 DIAGNOSIS — Z94.2 LUNG REPLACED BY TRANSPLANT (H): Primary | ICD-10-CM

## 2022-10-11 DIAGNOSIS — I74.3 POPLITEAL ARTERY THROMBOSIS, RIGHT (H): ICD-10-CM

## 2022-10-11 DIAGNOSIS — I10 ESSENTIAL HYPERTENSION: ICD-10-CM

## 2022-10-11 DIAGNOSIS — Z94.2 S/P LUNG TRANSPLANT (H): Primary | ICD-10-CM

## 2022-10-11 PROCEDURE — 99214 OFFICE O/P EST MOD 30 MIN: CPT | Mod: 95 | Performed by: PHYSICIAN ASSISTANT

## 2022-10-11 RX ORDER — LISINOPRIL 10 MG/1
10 TABLET ORAL 2 TIMES DAILY
Qty: 90 TABLET | Refills: 3 | COMMUNITY
Start: 2022-10-11 | End: 2022-11-29

## 2022-10-11 RX ORDER — AMLODIPINE BESYLATE 10 MG/1
10 TABLET ORAL AT BEDTIME
Qty: 90 TABLET | Refills: 3 | Status: SHIPPED | OUTPATIENT
Start: 2022-10-11 | End: 2022-11-02 | Stop reason: SINTOL

## 2022-10-11 NOTE — LETTER
10/11/2022         RE: Aubrey Duncan  [address]  [address] [facility] [address]  [facility] [address]        Dear Colleague,    Thank you for referring your patient, Aubrey Duncan, to the [facility] [place]. Please see a copy of my visit note below.    Aubrey Duncan is a 67 year old male who is being evaluated via a billable video visit. 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      1. S/p bilateral lung function: no new pulmonary complaints, isn't very active secondary to his neuropathy and a wound on his right heel. Denies change in dyspnea or new cough. Per notes, PFTs began having a decline in function between 5/2021-7/2021 with overall decline in FVC and FEV1 by about 500 mL. Chest CT on 12/9/21 with small airways air trapping on expiration suggestive of CLAD. DSA 6/3/21 negative and CMV 8/12 negative. PFTs in August at OSH were stable from his May values and FEV1 is 82% of his post transplant best. No CXR or DSA today.    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  - Insulin lantus 23U, aspart as needed     3. 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 Will get updated labs this week.     4. H/o SCC of left forearm: s/p Mohs 6/2016. Seen by Derm in [place] on 2/1/22, follows regularly. Did not address today.      5.  HTN: BP remains high, also notes increase in dry cough that possibly corresponds to increase in lisinopril.  - Increase amlodipine to 10 mg daily; decrease lisinopril back to 5 mg daily  - Metoprolol 25 mg bid     6. Hyperlipidemia: prior decrease in rosuvastatin 20 mg MWF.  - CMP later this week per below     7. Mild ALT/AST elevation: in the past related to medications, unsure at this point if it may be on the downtrend after acute gastroenteritis, wonder if he had a mild hepatitis. No alcohol use or excessive tylenol use.   - CMP this week  - If elevated again, will likely send for US Abd and consider stopping statin    RTC: as scheduled  Vaccinations: received flu shot and 4th covid vaccine on 9/22; Evusheld due > 11/26  Prevenative: DEXA > 11/4/22; follows with Derm per above    Starr Puentes PA-C    Interval history: no new or unexpected shortness of breath, walks around a little bit, but not very physically active. Has a sore on his right heel, saw Dr. Rosales yesterday, so that limits his walking. Walking also limited by neuropathy, R>L. No fever or chills, no recent illnesses. Does have a cough, not new, more clearing of the throat in the morning, wife feels like it's more frequent. No O2 use. No new chest pain or palpitations. Bloating and gas related to what he eats, BM are good, does take imodium for the diarrhea.     BP this am: 161/77 (always that high in the morning), HR 77, T 97.9    GENERAL: Healthy, alert and no distress  EYES: Eyes grossly normal to inspection.  No discharge or erythema, or obvious scleral/conjunctival abnormalities.  RESP: No audible wheeze, cough, or visible cyanosis.  No visible retractions or increased work of breathing.    SKIN: Visible skin clear. No significant rash, abnormal pigmentation or lesions.  NEURO: Cranial nerves grossly intact.  Mentation and speech appropriate for age.  PSYCH: Mentation appears normal, affect normal/bright, judgement and insight intact, normal  speech and appearance well-groomed.    How would you like to obtain your AVS? Zevia  If the video visit is dropped, the invitation should be resent by: Text to cell phone: [phone]  Will anyone else be joining your video visit? No        Video-Visit Details    Video Start Time: 2:12    Type of service:  Video Visit    Video End Time: 2:45    Originating Location (pt. Location): Home    Distant Location (provider location):  [facility]     Platform used for Video Visit: Kin is a 67 year old who is being evaluated via a billable video visit.      How would you like to obtain your AVS? Sundeep          Transplant Coordinator Note    Reason for visit: Post lung transplant follow up visit   Coordinator: Present (Luz via video)  Caregiver:  Pt's wife    Health concerns addressed today:  1. Neuropathy in feet (right worse than left)  2. Respiratory: has to clear his throat often  3. GI/: continues to take imodium    4. BPs elevated in the AM (150s/80s)    Activity/rehab: Up ad li  Oxygen needs: Room air  Pain management/RX: PRN tylenol for foot pain  Diabetic management: Managed by PCP  DVT/PE: apixaban 5 mg BID  AC/asa: aspirin 81 mg  PJP prophylactic: bactrim    COVID:  1. COVID-19 infection (yes/no, date of most recent positive test):   2. Status/instructions given about COVID-19 vaccine: last covid vaccine on 9/22/22  3. Evusheld: last 5/26/22 - due 11/26/22    Pt Education: medications (use/dose/side effects), how/when to call coordinator, frequency of labs, s/s of infection/rejection, call prior to starting any new medications, lab/vital sign book    Health Maintenance:     Last colonoscopy:     Next colonoscopy due:     Dermatology:    Vaccinations this visit:     Labs, CXR, PFTs reviewed with patient  Medication record reviewed and reconciled  Questions and concerns addressed    Patient Instructions  1. Continue to hydrate with 60-70 oz  fluids daily.  2. Continue to exercise daily or most days of the week.  3. Follow up with your primary care provider for annual gender health maintenance procedures.  4. Follow up with colonoscopy schedule.  5. Follow up with annual dermatology visits.  6. It doesn't seem like the COVID vaccine is working well in lung transplant patients. A number of lung transplant patients have gotten sick with COVID even after receiving the vaccines.  Based on our recent experience, it can be life-threatening to get COVID  even after being vaccinated. Please continue to act like you did not get the COVID vaccine - social distancing, wearing a mask, good hand hygiene, etc. If the people around you are vaccinated, it will help reduce the risk of you getting COVID. All members of your household should be vaccinated.  7. Get labs done tomorrow.  8. We set up a referral for you to see hematology (blood doctors).  This can be a video visit.  9. Follow up with vascular surgery.  10. Increase your amlodipine to 10 mg daily (in the evening).  Continue to monitor your blood pressure twice per day.  11.  Decrease your lisinopril back to 5 mg daily.  See if this helps improve your cough.  If it doesn't improve, let Luz know.    Next transplant clinic appointment:  11/17 with CXR, labs and PFTs  Next lab draw: tomorrow, 10/11/22      AVS printed at time of check out            Again, thank you for allowing me to participate in the care of your patient.        Sincerely,        Starr Puentes PA-C

## 2022-10-11 NOTE — PATIENT INSTRUCTIONS
Patient Instructions  1. Continue to hydrate with 60-70 oz fluids daily.  2. Continue to exercise daily or most days of the week.  3. Follow up with your primary care provider for annual gender health maintenance procedures.  4. Follow up with colonoscopy schedule.  5. Follow up with annual dermatology visits.  6. It doesn't seem like the COVID vaccine is working well in lung transplant patients. A number of lung transplant patients have gotten sick with COVID even after receiving the vaccines.  Based on our recent experience, it can be life-threatening to get COVID  even after being vaccinated. Please continue to act like you did not get the COVID vaccine - social distancing, wearing a mask, good hand hygiene, etc. If the people around you are vaccinated, it will help reduce the risk of you getting COVID. All members of your household should be vaccinated.  7. Get labs done tomorrow.  8. We set up a referral for you to see hematology (blood doctors).  This can be a video visit.  9. Follow up with vascular surgery.  10. Increase your amlodipine to 10 mg daily (in the evening).  Continue to monitor your blood pressure twice per day.  11.  Decrease your lisinopril back to 5 mg daily.  See if this helps improve your cough.  If it doesn't improve, let Luz know.    Next transplant clinic appointment:  11/17 with CXR, labs and PFTs  Next lab draw: tomorrow, 10/11/22    ~~~~~~~~~~~~~~~~~~~~~~~~~    Thoracic Transplant Office phone [phone], fax [phone]    Office Hours 8:30 - 5:00     For after-hours urgent issues, please dial [phone], and ask to speak with the Thoracic Transplant Coordinator On-Call.  --------------------  To expedite your medication refill(s), please contact your pharmacy and have them fax a refill request to: [phone]  .   *Please allow 3 business days for routine medication refills.  *Please allow 5 business days for controlled substance medication refills.    **For Diabetic  medications and supplies refill(s), please contact your pharmacy and have them contact your Endocrine team.  --------------------  For scheduling appointments call [phone].  --------------------  Please Note: If you are active on [employer], all future test results will be sent by [employer] message only, and will no longer be called to patient. You may also receive communication directly from your physician.

## 2022-10-11 NOTE — PROGRESS NOTES
Transplant Coordinator Note    Reason for visit: Post lung transplant follow up visit   Coordinator: Present (Luz via video)  Caregiver:  Pt's wife    Health concerns addressed today:  1. Neuropathy in feet (right worse than left)  2. Respiratory: has to clear his throat often  3. GI/: continues to take imodium    4. BPs elevated in the AM (150s/80s)    Activity/rehab: Up ad li  Oxygen needs: Room air  Pain management/RX: PRN tylenol for foot pain  Diabetic management: Managed by PCP  DVT/PE: apixaban 5 mg BID  AC/asa: aspirin 81 mg  PJP prophylactic: bactrim    COVID:  1. COVID-19 infection (yes/no, date of most recent positive test):   2. Status/instructions given about COVID-19 vaccine: last covid vaccine on 9/22/22  3. Evusheld: last 5/26/22 - due 11/26/22    Pt Education: medications (use/dose/side effects), how/when to call coordinator, frequency of labs, s/s of infection/rejection, call prior to starting any new medications, lab/vital sign book    Health Maintenance:     Last colonoscopy:     Next colonoscopy due:     Dermatology:    Vaccinations this visit:     Labs, CXR, PFTs reviewed with patient  Medication record reviewed and reconciled  Questions and concerns addressed    Patient Instructions  1. Continue to hydrate with 60-70 oz fluids daily.  2. Continue to exercise daily or most days of the week.  3. Follow up with your primary care provider for annual gender health maintenance procedures.  4. Follow up with colonoscopy schedule.  5. Follow up with annual dermatology visits.  6. It doesn't seem like the COVID vaccine is working well in lung transplant patients. A number of lung transplant patients have gotten sick with COVID even after receiving the vaccines.  Based on our recent experience, it can be life-threatening to get COVID  even after being vaccinated. Please continue to act like you did not get the COVID vaccine - social distancing, wearing a mask, good hand hygiene, etc. If the people  around you are vaccinated, it will help reduce the risk of you getting COVID. All members of your household should be vaccinated.  7. Get labs done tomorrow.  8. We set up a referral for you to see hematology (blood doctors).  This can be a video visit.  9. Follow up with vascular surgery.  10. Increase your amlodipine to 10 mg daily (in the evening).  Continue to monitor your blood pressure twice per day.  11.  Decrease your lisinopril back to 5 mg daily.  See if this helps improve your cough.  If it doesn't improve, let Luz know.    Next transplant clinic appointment:  11/17 with CXR, labs and PFTs  Next lab draw: tomorrow, 10/11/22      AVS printed at time of check out

## 2022-10-12 ENCOUNTER — MYC MEDICAL ADVICE (OUTPATIENT)
Dept: ORTHOPEDICS | Facility: CLINIC | Age: 67
End: 2022-10-12

## 2022-10-12 ENCOUNTER — LAB (OUTPATIENT)
Dept: LAB | Facility: OTHER | Age: 67
End: 2022-10-12
Attending: INTERNAL MEDICINE
Payer: MEDICARE

## 2022-10-12 DIAGNOSIS — Z94.2 LUNG REPLACED BY TRANSPLANT (H): ICD-10-CM

## 2022-10-12 LAB
ALBUMIN SERPL-MCNC: 3.9 G/DL (ref 3.5–5.7)
ALP SERPL-CCNC: 33 U/L (ref 34–104)
ALT SERPL W P-5'-P-CCNC: 54 U/L (ref 7–52)
ANION GAP SERPL CALCULATED.3IONS-SCNC: 10 MMOL/L (ref 3–14)
AST SERPL W P-5'-P-CCNC: 37 U/L (ref 13–39)
BILIRUB SERPL-MCNC: 0.4 MG/DL (ref 0.3–1)
BUN SERPL-MCNC: 44 MG/DL (ref 7–25)
CALCIUM SERPL-MCNC: 9.2 MG/DL (ref 8.6–10.3)
CHLORIDE BLD-SCNC: 110 MMOL/L (ref 98–107)
CO2 SERPL-SCNC: 23 MMOL/L (ref 21–31)
CREAT SERPL-MCNC: 1.23 MG/DL (ref 0.7–1.3)
ERYTHROCYTE [DISTWIDTH] IN BLOOD BY AUTOMATED COUNT: 13.1 % (ref 10–15)
G6PD RBC-CCNT: 13.7 U/G HB
GFR SERPL CREATININE-BSD FRML MDRD: 64 ML/MIN/1.73M2
GLUCOSE BLD-MCNC: 71 MG/DL (ref 70–105)
HCT VFR BLD AUTO: 37.3 % (ref 40–53)
HGB BLD-MCNC: 12.3 G/DL (ref 13.3–17.7)
MAGNESIUM SERPL-MCNC: 1.7 MG/DL (ref 1.9–2.7)
MCH RBC QN AUTO: 35.5 PG (ref 26.5–33)
MCHC RBC AUTO-ENTMCNC: 33 G/DL (ref 31.5–36.5)
MCV RBC AUTO: 108 FL (ref 78–100)
PLATELET # BLD AUTO: 181 10E3/UL (ref 150–450)
POTASSIUM BLD-SCNC: 4.8 MMOL/L (ref 3.5–5.1)
PROT SERPL-MCNC: 6.5 G/DL (ref 6.4–8.9)
RBC # BLD AUTO: 3.46 10E6/UL (ref 4.4–5.9)
SODIUM SERPL-SCNC: 143 MMOL/L (ref 134–144)
WBC # BLD AUTO: 5.7 10E3/UL (ref 4–11)

## 2022-10-12 PROCEDURE — 85027 COMPLETE CBC AUTOMATED: CPT | Mod: ZL

## 2022-10-12 PROCEDURE — 82374 ASSAY BLOOD CARBON DIOXIDE: CPT | Mod: ZL

## 2022-10-12 PROCEDURE — 36415 COLL VENOUS BLD VENIPUNCTURE: CPT | Mod: ZL

## 2022-10-12 PROCEDURE — 83735 ASSAY OF MAGNESIUM: CPT | Mod: ZL

## 2022-10-12 PROCEDURE — 86833 HLA CLASS II HIGH DEFIN QUAL: CPT | Mod: ZL

## 2022-10-12 PROCEDURE — 86832 HLA CLASS I HIGH DEFIN QUAL: CPT | Mod: ZL

## 2022-10-12 RX ORDER — ECONAZOLE NITRATE 10 MG/G
CREAM TOPICAL DAILY
Qty: 85 G | Refills: 3 | Status: SHIPPED | OUTPATIENT
Start: 2022-10-12 | End: 2023-10-17

## 2022-10-13 ENCOUNTER — TELEPHONE (OUTPATIENT)
Dept: ORTHOPEDICS | Facility: CLINIC | Age: 67
End: 2022-10-13

## 2022-10-13 ENCOUNTER — TELEPHONE (OUTPATIENT)
Dept: TRANSPLANT | Facility: CLINIC | Age: 67
End: 2022-10-13

## 2022-10-13 DIAGNOSIS — Z94.2 LUNG REPLACED BY TRANSPLANT (H): Primary | ICD-10-CM

## 2022-10-13 LAB — CMV DNA SPEC NAA+PROBE-ACNC: NOT DETECTED IU/ML

## 2022-10-13 RX ORDER — DAPSONE 25 MG/1
50 TABLET ORAL DAILY
Qty: 180 TABLET | Refills: 3 | Status: SHIPPED | OUTPATIENT
Start: 2022-10-13 | End: 2023-01-10

## 2022-10-13 RX ORDER — MAGNESIUM OXIDE 400 MG/1
400 TABLET ORAL 2 TIMES DAILY
Qty: 180 TABLET | Refills: 3 | Status: SHIPPED | OUTPATIENT
Start: 2022-10-13 | End: 2022-11-15

## 2022-10-13 NOTE — TELEPHONE ENCOUNTER
Central Prior Authorization Team   Phone: [phone]      PA Initiation    Medication: econazole nitrate 1 % external cream  Insurance Company: [employer] - Phone [phone] Fax [phone]  Pharmacy Filling the Rx: [id number] ([employer] [facility] - [place], [place] - [address]  Filling Pharmacy Phone: [phone]  Filling Pharmacy Fax:    Start Date: 10/13/2022                   42

## 2022-10-13 NOTE — TELEPHONE ENCOUNTER
G6PD 13.7 on 10/10/22.  Per provider, plan for pt to switch from bactrim to dapsone.  Plan for pt to start dapsone 50 mg daily.    Mg 1.7 on 10/12.  Per provider, plan for pt to increase magnesium supplement to 400 mg BID.  Repeat labs next week.  Pt updated of plan and will call with any further questions or concerns.

## 2022-10-17 DIAGNOSIS — Z94.2 LUNG TRANSPLANT STATUS, BILATERAL (H): ICD-10-CM

## 2022-10-17 LAB
DONOR IDENTIFICATION: NORMAL
DSA COMMENTS: NORMAL
DSA PRESENT: NO
DSA TEST METHOD: NORMAL
ORGAN: NORMAL
SA 1 CELL: NORMAL
SA 1 TEST METHOD: NORMAL
SA 2 CELL: NORMAL
SA 2 TEST METHOD: NORMAL
SA1 HI RISK ABY: NORMAL
SA1 MOD RISK ABY: NORMAL
SA2 HI RISK ABY: NORMAL
SA2 MOD RISK ABY: NORMAL
UNACCEPTABLE ANTIGENS: NORMAL
UNOS CPRA: 0
ZZZSA 1  COMMENTS: NORMAL
ZZZSA 2 COMMENTS: NORMAL

## 2022-10-17 RX ORDER — PREDNISONE 5 MG/1
TABLET ORAL
Qty: 45 TABLET | Refills: 12 | Status: SHIPPED | OUTPATIENT
Start: 2022-10-17 | End: 2023-10-26

## 2022-10-18 DIAGNOSIS — Z94.2 LUNG REPLACED BY TRANSPLANT (H): ICD-10-CM

## 2022-10-19 RX ORDER — AZITHROMYCIN 250 MG/1
250 TABLET, FILM COATED ORAL
Qty: 38 TABLET | Refills: 3 | Status: SHIPPED | OUTPATIENT
Start: 2022-10-19 | End: 2023-11-24

## 2022-10-21 ENCOUNTER — MYC MEDICAL ADVICE (OUTPATIENT)
Dept: FAMILY MEDICINE | Facility: OTHER | Age: 67
End: 2022-10-21

## 2022-10-21 DIAGNOSIS — E11.9 DIABETES MELLITUS TYPE 2, DIET-CONTROLLED (H): ICD-10-CM

## 2022-10-21 NOTE — TELEPHONE ENCOUNTER
Please call [employer] patient assistance program at [phone].  I need the [employer] patient assistance program refill/reorder request form faxed over for completion. BRANDI Manning CNP on 10/21/2022 at 1:00 PM

## 2022-10-24 ENCOUNTER — TRANSFERRED RECORDS (OUTPATIENT)
Dept: HEALTH INFORMATION MANAGEMENT | Facility: OTHER | Age: 67
End: 2022-10-24

## 2022-10-24 LAB — RETINOPATHY: NEGATIVE

## 2022-10-25 ENCOUNTER — MYC MEDICAL ADVICE (OUTPATIENT)
Dept: FAMILY MEDICINE | Facility: OTHER | Age: 67
End: 2022-10-25

## 2022-10-25 ENCOUNTER — TELEPHONE (OUTPATIENT)
Dept: TRANSPLANT | Facility: CLINIC | Age: 67
End: 2022-10-25

## 2022-10-25 DIAGNOSIS — Z94.2 LUNG REPLACED BY TRANSPLANT (H): Primary | ICD-10-CM

## 2022-10-25 DIAGNOSIS — I73.9 PAD (PERIPHERAL ARTERY DISEASE) (H): ICD-10-CM

## 2022-10-25 DIAGNOSIS — Z79.4 TYPE 2 DIABETES MELLITUS WITH DIABETIC POLYNEUROPATHY, WITH LONG-TERM CURRENT USE OF INSULIN (H): ICD-10-CM

## 2022-10-25 DIAGNOSIS — E11.9 DIABETES MELLITUS TYPE 2, DIET-CONTROLLED (H): ICD-10-CM

## 2022-10-25 DIAGNOSIS — E11.42 TYPE 2 DIABETES MELLITUS WITH DIABETIC POLYNEUROPATHY, WITH LONG-TERM CURRENT USE OF INSULIN (H): ICD-10-CM

## 2022-10-25 NOTE — TELEPHONE ENCOUNTER
Plan per Dr. Murphy:    - BLE venous US  - if US negative, plan to stop amlodipine and trial uptitrating lisinopril  - plan to continue to monitor labs (BMP)

## 2022-10-25 NOTE — TELEPHONE ENCOUNTER
Pt calls c/o increaed BLE swelling in the last couple weeks.     Worse at night. R slightly more edematous than L (this is normal for him). Has neuropathy baseline. No increase in pain/discomofrt with increased swelling.     Recently increased amlodipine dosage.     Elevates feet at night during evening hours. Instructed to follow low sodium diet.     History of: Diabetes with peripheral vascular disease, diabetes with peripheral neuropathy. He has peripheral edema with varicosities on the right lower extremity.  Has had DVTs in BLE in past.     He will see Dr. Duncan from Hemophilia in a few weeks.     Will consult with Dr. Murphy about use of compression socks.

## 2022-10-26 ENCOUNTER — LAB (OUTPATIENT)
Dept: LAB | Facility: OTHER | Age: 67
End: 2022-10-26
Attending: INTERNAL MEDICINE
Payer: MEDICARE

## 2022-10-26 ENCOUNTER — TELEPHONE (OUTPATIENT)
Dept: TRANSPLANT | Facility: CLINIC | Age: 67
End: 2022-10-26

## 2022-10-26 DIAGNOSIS — Z94.2 LUNG REPLACED BY TRANSPLANT (H): ICD-10-CM

## 2022-10-26 LAB
ANION GAP SERPL CALCULATED.3IONS-SCNC: 6 MMOL/L (ref 3–14)
BUN SERPL-MCNC: 36 MG/DL (ref 7–25)
CALCIUM SERPL-MCNC: 9.1 MG/DL (ref 8.6–10.3)
CHLORIDE BLD-SCNC: 107 MMOL/L (ref 98–107)
CO2 SERPL-SCNC: 28 MMOL/L (ref 21–31)
CREAT SERPL-MCNC: 1.22 MG/DL (ref 0.7–1.3)
ERYTHROCYTE [DISTWIDTH] IN BLOOD BY AUTOMATED COUNT: 12.8 % (ref 10–15)
GFR SERPL CREATININE-BSD FRML MDRD: 64 ML/MIN/1.73M2
GLUCOSE BLD-MCNC: 92 MG/DL (ref 70–105)
HCT VFR BLD AUTO: 35.4 % (ref 40–53)
HGB BLD-MCNC: 11.5 G/DL (ref 13.3–17.7)
MAGNESIUM SERPL-MCNC: 1.7 MG/DL (ref 1.9–2.7)
MCH RBC QN AUTO: 35.1 PG (ref 26.5–33)
MCHC RBC AUTO-ENTMCNC: 32.5 G/DL (ref 31.5–36.5)
MCV RBC AUTO: 108 FL (ref 78–100)
PLATELET # BLD AUTO: 183 10E3/UL (ref 150–450)
POTASSIUM BLD-SCNC: 4.5 MMOL/L (ref 3.5–5.1)
RBC # BLD AUTO: 3.28 10E6/UL (ref 4.4–5.9)
SODIUM SERPL-SCNC: 141 MMOL/L (ref 134–144)
WBC # BLD AUTO: 4.6 10E3/UL (ref 4–11)

## 2022-10-26 PROCEDURE — 85027 COMPLETE CBC AUTOMATED: CPT | Mod: ZL

## 2022-10-26 PROCEDURE — 83735 ASSAY OF MAGNESIUM: CPT | Mod: ZL

## 2022-10-26 PROCEDURE — 80048 BASIC METABOLIC PNL TOTAL CA: CPT | Mod: ZL

## 2022-10-26 PROCEDURE — 36415 COLL VENOUS BLD VENIPUNCTURE: CPT | Mod: ZL

## 2022-10-27 ENCOUNTER — TELEPHONE (OUTPATIENT)
Dept: TRANSPLANT | Facility: CLINIC | Age: 67
End: 2022-10-27

## 2022-10-27 DIAGNOSIS — Z94.2 LUNG REPLACED BY TRANSPLANT (H): Primary | ICD-10-CM

## 2022-10-27 DIAGNOSIS — R60.9 EDEMA, UNSPECIFIED: ICD-10-CM

## 2022-10-27 NOTE — TELEPHONE ENCOUNTER
"Reviewed plan with pt and his wife.  Pt states that BPs continue to be 130s-140s/70s-80s in the AM and 110s/70s in the PM.  BLE edema is \"still the same.\"  Swelling worsens within 1-2 hours after waking up in the morning.  Pt continues to elevate legs when able to and avoid salt in his diet.  Pt endorses pain in his L shin that comes and goes with activity.  Encouraged pt to monitor symptoms and call transplant office with any further questions or concerns or if swelling worsens or he notices increased pain or redness.  "

## 2022-10-27 NOTE — TELEPHONE ENCOUNTER
Ulices Nordisk called , Novolog flex pen and needles have been processed and will be sent to the patients address. They will arrive in 10-14 business days. They will notify patient .     No call back needed.     Chelita Almeida on 10/27/2022 at 10:48 AM

## 2022-10-28 RX ORDER — PEN NEEDLE, DIABETIC 32GX 5/32"
NEEDLE, DISPOSABLE MISCELLANEOUS
Qty: 400 EACH | Refills: 11 | OUTPATIENT
Start: 2022-10-28

## 2022-11-01 ENCOUNTER — HOSPITAL ENCOUNTER (OUTPATIENT)
Dept: ULTRASOUND IMAGING | Facility: CLINIC | Age: 67
Discharge: HOME | End: 2022-11-01
Attending: INTERNAL MEDICINE | Admitting: INTERNAL MEDICINE
Payer: MEDICARE

## 2022-11-01 DIAGNOSIS — Z94.2 LUNG REPLACED BY TRANSPLANT (H): ICD-10-CM

## 2022-11-01 DIAGNOSIS — R60.9 EDEMA, UNSPECIFIED: ICD-10-CM

## 2022-11-01 PROCEDURE — 93970 EXTREMITY STUDY: CPT

## 2022-11-02 ENCOUNTER — TELEPHONE (OUTPATIENT)
Dept: TRANSPLANT | Facility: CLINIC | Age: 67
End: 2022-11-02

## 2022-11-02 DIAGNOSIS — Z94.2 LUNG REPLACED BY TRANSPLANT (H): Primary | ICD-10-CM

## 2022-11-02 NOTE — TELEPHONE ENCOUNTER
BLE US completed.  Negative for DVT.  Plan for pt to stop amlodipine and increase lisinopril from 5 mg to 10 mg daily.  Plan to continue to monitor BP BID.  Repeat labs in one week.  Orders faxed to [facility] in [place], [place].

## 2022-11-02 NOTE — LETTER
PHYSICIAN ORDERS      DATE & TIME ISSUED: 2022 1:31 PM  PATIENT NAME: Aubrey Duncan   : 1955     [facility] MR# [if applicable]: [id number]     DIAGNOSIS:  Lung Transplant  Z94.2    Please draw BMP, magnesium, and CBC w/ platelets week of 22.    Any questions please call: Luz [phone]    Please fax these results to [phone].         Alma Murphy MD  Pulmonary Disease

## 2022-11-04 ENCOUNTER — TELEPHONE (OUTPATIENT)
Dept: TRANSPLANT | Facility: CLINIC | Age: 67
End: 2022-11-04

## 2022-11-04 NOTE — TELEPHONE ENCOUNTER
Returned pt's phone call.  He states that [facility] in [facility] states that they don't have lab orders that were sent on 11/2.  Writer spoke with [facility] and faxed orders again to [phone].

## 2022-11-04 NOTE — LETTER
PHYSICIAN ORDERS      DATE & TIME ISSUED: 2022 11:57 AM  PATIENT NAME: Aubrey Duncan   : 1955     [facility] MR# [if applicable]: [id number]     DIAGNOSIS:  Lung Transplant  Z94.2    Please draw BMP, magnesium, and CBC w/ platelets week of 22.    Any questions please call: Luz [phone]    Please fax these results to [phone].         Alma Murphy MD  Pulmonary Disease

## 2022-11-04 NOTE — TELEPHONE ENCOUNTER
General  Route to LPN    Reason for call: Aubrey called to speak to Luz    Call back needed? Yes    Return Call Needed  Same as documented in contacts section  When to return call?: Greater than one day: Route standard priority
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
## 2022-11-13 DIAGNOSIS — Z94.2 LUNG REPLACED BY TRANSPLANT (H): Primary | ICD-10-CM

## 2022-11-14 ENCOUNTER — OFFICE VISIT (OUTPATIENT)
Dept: HEMATOLOGY | Facility: CLINIC | Age: 67
End: 2022-11-14
Attending: PHYSICIAN ASSISTANT
Payer: MEDICARE

## 2022-11-14 ENCOUNTER — OFFICE VISIT (OUTPATIENT)
Dept: ORTHOPEDICS | Facility: CLINIC | Age: 67
End: 2022-11-14
Payer: MEDICARE

## 2022-11-14 VITALS
SYSTOLIC BLOOD PRESSURE: 168 MMHG | HEART RATE: 80 BPM | WEIGHT: 177 LBS | DIASTOLIC BLOOD PRESSURE: 77 MMHG | TEMPERATURE: 98.7 F | OXYGEN SATURATION: 94 % | BODY MASS INDEX: 26.91 KG/M2

## 2022-11-14 DIAGNOSIS — L97.922 SKIN ULCER OF LEFT LOWER LEG WITH FAT LAYER EXPOSED (H): ICD-10-CM

## 2022-11-14 DIAGNOSIS — E11.49 TYPE II OR UNSPECIFIED TYPE DIABETES MELLITUS WITH NEUROLOGICAL MANIFESTATIONS, NOT STATED AS UNCONTROLLED(250.60) (H): ICD-10-CM

## 2022-11-14 DIAGNOSIS — I74.3 POPLITEAL ARTERY THROMBOSIS, RIGHT (H): ICD-10-CM

## 2022-11-14 DIAGNOSIS — R23.4 SKIN FISSURE: ICD-10-CM

## 2022-11-14 DIAGNOSIS — L97.912 SKIN ULCER OF RIGHT LOWER LEG WITH FAT LAYER EXPOSED (H): ICD-10-CM

## 2022-11-14 DIAGNOSIS — Z94.2 S/P LUNG TRANSPLANT (H): ICD-10-CM

## 2022-11-14 DIAGNOSIS — E11.51 DIABETES MELLITUS WITH PERIPHERAL VASCULAR DISEASE (H): Primary | ICD-10-CM

## 2022-11-14 DIAGNOSIS — B35.3 TINEA PEDIS OF BOTH FEET: ICD-10-CM

## 2022-11-14 DIAGNOSIS — D64.9 ANEMIA, UNSPECIFIED TYPE: Primary | ICD-10-CM

## 2022-11-14 LAB
BASOPHILS # BLD AUTO: 0 10E3/UL (ref 0–0.2)
BASOPHILS NFR BLD AUTO: 1 %
EOSINOPHIL # BLD AUTO: 0.2 10E3/UL (ref 0–0.7)
EOSINOPHIL NFR BLD AUTO: 3 %
ERYTHROCYTE [DISTWIDTH] IN BLOOD BY AUTOMATED COUNT: 14.5 % (ref 10–15)
FERRITIN SERPL-MCNC: 1474 NG/ML (ref 26–388)
FOLATE SERPL-MCNC: 18.3 NG/ML (ref 4.6–34.8)
HCT VFR BLD AUTO: 29.9 % (ref 40–53)
HGB BLD-MCNC: 9.7 G/DL (ref 13.3–17.7)
IMM GRANULOCYTES # BLD: 0.1 10E3/UL
IMM GRANULOCYTES NFR BLD: 1 %
IRON SATN MFR SERPL: 31 % (ref 15–46)
IRON SERPL-MCNC: 79 UG/DL (ref 35–180)
LYMPHOCYTES # BLD AUTO: 0.9 10E3/UL (ref 0.8–5.3)
LYMPHOCYTES NFR BLD AUTO: 13 %
MCH RBC QN AUTO: 35.3 PG (ref 26.5–33)
MCHC RBC AUTO-ENTMCNC: 32.4 G/DL (ref 31.5–36.5)
MCV RBC AUTO: 109 FL (ref 78–100)
MONOCYTES # BLD AUTO: 0.8 10E3/UL (ref 0–1.3)
MONOCYTES NFR BLD AUTO: 11 %
NEUTROPHILS # BLD AUTO: 4.9 10E3/UL (ref 1.6–8.3)
NEUTROPHILS NFR BLD AUTO: 71 %
NRBC # BLD AUTO: 0 10E3/UL
NRBC BLD AUTO-RTO: 0 /100
PLATELET # BLD AUTO: 211 10E3/UL (ref 150–450)
RBC # BLD AUTO: 2.75 10E6/UL (ref 4.4–5.9)
RETICS # AUTO: 0.13 10E6/UL (ref 0.03–0.1)
RETICS/RBC NFR AUTO: 4.8 % (ref 0.5–2)
TIBC SERPL-MCNC: 256 UG/DL (ref 240–430)
VIT B12 SERPL-MCNC: 1187 PG/ML (ref 232–1245)
WBC # BLD AUTO: 6.8 10E3/UL (ref 4–11)

## 2022-11-14 PROCEDURE — G0463 HOSPITAL OUTPT CLINIC VISIT: HCPCS

## 2022-11-14 PROCEDURE — 36415 COLL VENOUS BLD VENIPUNCTURE: CPT | Performed by: STUDENT IN AN ORGANIZED HEALTH CARE EDUCATION/TRAINING PROGRAM

## 2022-11-14 PROCEDURE — 99205 OFFICE O/P NEW HI 60 MIN: CPT | Performed by: STUDENT IN AN ORGANIZED HEALTH CARE EDUCATION/TRAINING PROGRAM

## 2022-11-14 PROCEDURE — 82728 ASSAY OF FERRITIN: CPT | Performed by: STUDENT IN AN ORGANIZED HEALTH CARE EDUCATION/TRAINING PROGRAM

## 2022-11-14 PROCEDURE — 85045 AUTOMATED RETICULOCYTE COUNT: CPT | Performed by: STUDENT IN AN ORGANIZED HEALTH CARE EDUCATION/TRAINING PROGRAM

## 2022-11-14 PROCEDURE — 83550 IRON BINDING TEST: CPT | Performed by: STUDENT IN AN ORGANIZED HEALTH CARE EDUCATION/TRAINING PROGRAM

## 2022-11-14 PROCEDURE — 82607 VITAMIN B-12: CPT | Performed by: STUDENT IN AN ORGANIZED HEALTH CARE EDUCATION/TRAINING PROGRAM

## 2022-11-14 PROCEDURE — 85025 COMPLETE CBC W/AUTO DIFF WBC: CPT | Performed by: STUDENT IN AN ORGANIZED HEALTH CARE EDUCATION/TRAINING PROGRAM

## 2022-11-14 PROCEDURE — 82746 ASSAY OF FOLIC ACID SERUM: CPT | Performed by: STUDENT IN AN ORGANIZED HEALTH CARE EDUCATION/TRAINING PROGRAM

## 2022-11-14 PROCEDURE — 99215 OFFICE O/P EST HI 40 MIN: CPT | Performed by: PODIATRIST

## 2022-11-14 RX ORDER — CIPROFLOXACIN 500 MG/1
500 TABLET, FILM COATED ORAL 2 TIMES DAILY
Qty: 28 TABLET | Refills: 0 | Status: SHIPPED | OUTPATIENT
Start: 2022-11-14 | End: 2022-11-28

## 2022-11-14 NOTE — NURSING NOTE
Reason For Visit:   Chief Complaint   Patient presents with     RECHECK     Follow up right foot ulcer.       There were no vitals taken for this visit.    Pain Assessment  Patient Currently in Pain: No (patient denies any right foot pain today)    JOCELYN GARCIA, ATC
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
## 2022-11-14 NOTE — PROGRESS NOTES
Vitals completed along with medication and allergy review. Jackie Adams CMA    Labs ordered for this visit, drawn at [facility] and delivered to 3rd floor lab.Dana Adams CMA
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
## 2022-11-15 DIAGNOSIS — Z94.2 LUNG REPLACED BY TRANSPLANT (H): ICD-10-CM

## 2022-11-15 RX ORDER — MAGNESIUM OXIDE 400 MG/1
400 TABLET ORAL 2 TIMES DAILY
Qty: 180 TABLET | Refills: 3 | Status: ON HOLD | OUTPATIENT
Start: 2022-11-15 | End: 2024-09-04
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
## 2022-11-16 DIAGNOSIS — Z94.2 LUNG TRANSPLANT STATUS, BILATERAL (H): ICD-10-CM

## 2022-11-16 DIAGNOSIS — Z94.2 LUNG REPLACED BY TRANSPLANT (H): ICD-10-CM

## 2022-11-16 NOTE — PROGRESS NOTES
Transplant Coordinator Note    Reason for visit: Post lung transplant follow up visit   Coordinator: Present (Devante- in person)   Caregiver:  Present     Health concerns addressed today:  1. ENT: Will start doing nasal rinses.   2. Respiratory: Doing good. Still coughing up a lot after starting Augmentin 8 days ago, thinks it's helping but not totally cleared up. Sputum greenish/yellow. No wheezing. PFTs down slightly- CXR looked good.   3. GI: No heartburn or acid reflux.   4. Mood  5. Creatinine slightly elevated       Activity/rehab: Up ad li  Oxygen needs: Room air  Pain management/RX: PRN tylenol for foot pain  Diabetic management: Managed by PCP  DVT/PE: apixaban 5 mg BID  AC/asa: aspirin 81 mg  PJP prophylactic: bactrim     COVID:  1. COVID-19 infection (yes/no, date of most recent positive test):   2. Status/instructions given about COVID-19 vaccine: last covid vaccine on 9/22/22  3. Evusheld: last 5/26/22 - due 11/26/22      Pt Education: medications (use/dose/side effects), how/when to call coordinator, frequency of labs, s/s of infection/rejection, call prior to starting any new medications, lab/vital sign book    Health Maintenance:     Last colonoscopy:     Next colonoscopy due:     Dermatology:    Vaccinations this visit:     Labs, CXR, PFTs reviewed with patient  Medication record reviewed and reconciled  Questions and concerns addressed    Patient Instructions  1. Continue to hydrate with 60-70 oz fluids daily.  2. Continue to exercise daily or most days of the week.  3. Follow up with your primary care provider for annual gender health maintenance procedures.  4. Follow up with colonoscopy schedule.  5. Follow up with annual dermatology visits.  6. It doesn't seem like the COVID vaccine is working well in lung transplant patients. A number of lung transplant patients have gotten sick with COVID even after receiving the vaccines.  Based on our recent experience, it can be life-threatening to get  COVID  even after being vaccinated. Please continue to act like you did not get the COVID vaccine - social distancing, wearing a mask, good hand hygiene, etc. If the people around you are vaccinated, it will help reduce the risk of you getting COVID. All members of your household should be vaccinated.  7. Do sinus rinses two times a day to help with your sinuses. Use distilled water or boil water to put in bottle.   8. We will extend your course of Augmentin for another week.    9. Try and hydrate a little more- your kidney function is slightly elevated. This is probably because of your dear hunting recently.   10. We are increasing your Imuran to 50mg daily   11. You are getting Evusheld today.       Next transplant clinic appointment: 3-4 months  labs and PFTs for virtual visit (okay for no CXR)   Next lab draw: 2 weeks      AVS printed at time of check out

## 2022-11-17 ENCOUNTER — LAB (OUTPATIENT)
Dept: LAB | Facility: CLINIC | Age: 67
End: 2022-11-17
Payer: MEDICARE

## 2022-11-17 ENCOUNTER — OFFICE VISIT (OUTPATIENT)
Dept: TRANSPLANT | Facility: CLINIC | Age: 67
End: 2022-11-17
Attending: INTERNAL MEDICINE
Payer: MEDICARE

## 2022-11-17 ENCOUNTER — ANCILLARY PROCEDURE (OUTPATIENT)
Dept: GENERAL RADIOLOGY | Facility: CLINIC | Age: 67
End: 2022-11-17
Attending: INTERNAL MEDICINE
Payer: MEDICARE

## 2022-11-17 ENCOUNTER — ANCILLARY PROCEDURE (OUTPATIENT)
Dept: BONE DENSITY | Facility: CLINIC | Age: 67
End: 2022-11-17
Attending: INTERNAL MEDICINE
Payer: MEDICARE

## 2022-11-17 VITALS
BODY MASS INDEX: 25.39 KG/M2 | SYSTOLIC BLOOD PRESSURE: 135 MMHG | OXYGEN SATURATION: 96 % | WEIGHT: 167 LBS | DIASTOLIC BLOOD PRESSURE: 72 MMHG | HEART RATE: 86 BPM

## 2022-11-17 DIAGNOSIS — Z79.899 ENCOUNTER FOR LONG-TERM (CURRENT) USE OF HIGH-RISK MEDICATION: ICD-10-CM

## 2022-11-17 DIAGNOSIS — Z94.2 LUNG REPLACED BY TRANSPLANT (H): ICD-10-CM

## 2022-11-17 DIAGNOSIS — Z94.2 LUNG REPLACED BY TRANSPLANT (H): Primary | ICD-10-CM

## 2022-11-17 DIAGNOSIS — Z79.52 LONG TERM (CURRENT) USE OF SYSTEMIC STEROIDS: ICD-10-CM

## 2022-11-17 DIAGNOSIS — E11.9 DIABETES MELLITUS TYPE 2, DIET-CONTROLLED (H): ICD-10-CM

## 2022-11-17 DIAGNOSIS — Z94.2 S/P LUNG TRANSPLANT (H): ICD-10-CM

## 2022-11-17 DIAGNOSIS — J01.00 ACUTE MAXILLARY SINUSITIS, RECURRENCE NOT SPECIFIED: ICD-10-CM

## 2022-11-17 DIAGNOSIS — Z23 NEED FOR COVID-19 VACCINE: Primary | ICD-10-CM

## 2022-11-17 LAB
6 MIN WALK (FT): NORMAL FT
6 MIN WALK (M): NORMAL M
ALBUMIN SERPL BCG-MCNC: 3.8 G/DL (ref 3.5–5.2)
ALP SERPL-CCNC: 63 U/L (ref 40–129)
ALT SERPL W P-5'-P-CCNC: 33 U/L (ref 10–50)
ANION GAP SERPL CALCULATED.3IONS-SCNC: 12 MMOL/L (ref 7–15)
AST SERPL W P-5'-P-CCNC: 41 U/L (ref 10–50)
BASOPHILS # BLD AUTO: 0.1 10E3/UL (ref 0–0.2)
BASOPHILS NFR BLD AUTO: 1 %
BILIRUB SERPL-MCNC: 0.7 MG/DL
BUN SERPL-MCNC: 36.7 MG/DL (ref 8–23)
CALCIUM SERPL-MCNC: 8.8 MG/DL (ref 8.8–10.2)
CHLORIDE SERPL-SCNC: 107 MMOL/L (ref 98–107)
CHOLEST SERPL-MCNC: 122 MG/DL
CMV DNA SPEC NAA+PROBE-ACNC: NOT DETECTED IU/ML
CREAT SERPL-MCNC: 1.48 MG/DL (ref 0.67–1.17)
DEPRECATED CALCIDIOL+CALCIFEROL SERPL-MC: 34 UG/L (ref 20–75)
DEPRECATED HCO3 PLAS-SCNC: 24 MMOL/L (ref 22–29)
EOSINOPHIL # BLD AUTO: 0.2 10E3/UL (ref 0–0.7)
EOSINOPHIL NFR BLD AUTO: 4 %
ERYTHROCYTE [DISTWIDTH] IN BLOOD BY AUTOMATED COUNT: 14.6 % (ref 10–15)
GFR SERPL CREATININE-BSD FRML MDRD: 51 ML/MIN/1.73M2
GLUCOSE SERPL-MCNC: 92 MG/DL (ref 70–99)
HBA1C MFR BLD: 4.3 %
HCT VFR BLD AUTO: 32.2 % (ref 40–53)
HDLC SERPL-MCNC: 44 MG/DL
HGB BLD-MCNC: 9.9 G/DL (ref 13.3–17.7)
IGG SERPL-MCNC: 809 MG/DL (ref 610–1616)
IMM GRANULOCYTES # BLD: 0 10E3/UL
IMM GRANULOCYTES NFR BLD: 1 %
LDLC SERPL CALC-MCNC: 51 MG/DL
LYMPHOCYTES # BLD AUTO: 1.8 10E3/UL (ref 0.8–5.3)
LYMPHOCYTES NFR BLD AUTO: 32 %
MAGNESIUM SERPL-MCNC: 2.2 MG/DL (ref 1.7–2.3)
MCH RBC QN AUTO: 34.9 PG (ref 26.5–33)
MCHC RBC AUTO-ENTMCNC: 30.7 G/DL (ref 31.5–36.5)
MCV RBC AUTO: 113 FL (ref 78–100)
MONOCYTES # BLD AUTO: 0.6 10E3/UL (ref 0–1.3)
MONOCYTES NFR BLD AUTO: 10 %
NEUTROPHILS # BLD AUTO: 3 10E3/UL (ref 1.6–8.3)
NEUTROPHILS NFR BLD AUTO: 52 %
NONHDLC SERPL-MCNC: 78 MG/DL
NRBC # BLD AUTO: 0 10E3/UL
NRBC BLD AUTO-RTO: 0 /100
PHOSPHATE SERPL-MCNC: 3.7 MG/DL (ref 2.5–4.5)
PLATELET # BLD AUTO: 203 10E3/UL (ref 150–450)
POTASSIUM SERPL-SCNC: 4.3 MMOL/L (ref 3.4–5.3)
PROT SERPL-MCNC: 6.3 G/DL (ref 6.4–8.3)
RBC # BLD AUTO: 2.84 10E6/UL (ref 4.4–5.9)
SODIUM SERPL-SCNC: 143 MMOL/L (ref 136–145)
TACROLIMUS BLD-MCNC: 7.9 UG/L (ref 5–15)
TME LAST DOSE: NORMAL H
TME LAST DOSE: NORMAL H
TRIGL SERPL-MCNC: 135 MG/DL
WBC # BLD AUTO: 5.7 10E3/UL (ref 4–11)

## 2022-11-17 PROCEDURE — 77080 DXA BONE DENSITY AXIAL: CPT | Performed by: INTERNAL MEDICINE

## 2022-11-17 PROCEDURE — 84100 ASSAY OF PHOSPHORUS: CPT | Performed by: PATHOLOGY

## 2022-11-17 PROCEDURE — 94618 PULMONARY STRESS TESTING: CPT | Performed by: INTERNAL MEDICINE

## 2022-11-17 PROCEDURE — 36415 COLL VENOUS BLD VENIPUNCTURE: CPT | Performed by: PATHOLOGY

## 2022-11-17 PROCEDURE — 80061 LIPID PANEL: CPT | Performed by: PATHOLOGY

## 2022-11-17 PROCEDURE — 94729 DIFFUSING CAPACITY: CPT | Performed by: INTERNAL MEDICINE

## 2022-11-17 PROCEDURE — 99215 OFFICE O/P EST HI 40 MIN: CPT | Mod: 25 | Performed by: INTERNAL MEDICINE

## 2022-11-17 PROCEDURE — 82784 ASSAY IGA/IGD/IGG/IGM EACH: CPT | Performed by: INTERNAL MEDICINE

## 2022-11-17 PROCEDURE — 94375 RESPIRATORY FLOW VOLUME LOOP: CPT | Performed by: INTERNAL MEDICINE

## 2022-11-17 PROCEDURE — 87799 DETECT AGENT NOS DNA QUANT: CPT | Performed by: INTERNAL MEDICINE

## 2022-11-17 PROCEDURE — 86833 HLA CLASS II HIGH DEFIN QUAL: CPT | Performed by: INTERNAL MEDICINE

## 2022-11-17 PROCEDURE — 85025 COMPLETE CBC W/AUTO DIFF WBC: CPT | Performed by: PATHOLOGY

## 2022-11-17 PROCEDURE — 83036 HEMOGLOBIN GLYCOSYLATED A1C: CPT | Performed by: INTERNAL MEDICINE

## 2022-11-17 PROCEDURE — 80197 ASSAY OF TACROLIMUS: CPT | Performed by: INTERNAL MEDICINE

## 2022-11-17 PROCEDURE — 71046 X-RAY EXAM CHEST 2 VIEWS: CPT | Mod: GC | Performed by: RADIOLOGY

## 2022-11-17 PROCEDURE — 82306 VITAMIN D 25 HYDROXY: CPT | Performed by: INTERNAL MEDICINE

## 2022-11-17 PROCEDURE — M0220 HC INJECTION TIXAGEVIMAB & CILGAVIMAB (EVUSHELD): HCPCS | Performed by: INTERNAL MEDICINE

## 2022-11-17 PROCEDURE — 250N000011 HC RX IP 250 OP 636: Performed by: INTERNAL MEDICINE

## 2022-11-17 PROCEDURE — 83735 ASSAY OF MAGNESIUM: CPT | Performed by: PATHOLOGY

## 2022-11-17 PROCEDURE — 86832 HLA CLASS I HIGH DEFIN QUAL: CPT | Performed by: INTERNAL MEDICINE

## 2022-11-17 PROCEDURE — 80053 COMPREHEN METABOLIC PANEL: CPT | Performed by: PATHOLOGY

## 2022-11-17 RX ORDER — AZATHIOPRINE 50 MG/1
50 TABLET ORAL DAILY
Qty: 15 TABLET | Refills: 11 | COMMUNITY
Start: 2022-11-17 | End: 2022-11-21

## 2022-11-17 RX ORDER — AZATHIOPRINE 50 MG/1
25 TABLET ORAL DAILY
Qty: 45 TABLET | Refills: 3 | Status: SHIPPED | OUTPATIENT
Start: 2022-11-17 | End: 2023-02-16 | Stop reason: DRUGHIGH

## 2022-11-17 RX ADMIN — AZD7442 6 ML: KIT at 12:40

## 2022-11-17 ASSESSMENT — PAIN SCALES - GENERAL: PAINLEVEL: NO PAIN (0)

## 2022-11-17 NOTE — RESULT ENCOUNTER NOTE
Tacrolimus level 7.9 at 13 hours, on 11/17/22.  Goal 8-10.   Current dose 2 mg in AM, 2 mg in PM    Level at goal.  No dose change.    [employer] message sent

## 2022-11-17 NOTE — PATIENT INSTRUCTIONS
Patient Instructions  1. Continue to hydrate with 60-70 oz fluids daily.  2. Continue to exercise daily or most days of the week.  3. Follow up with your primary care provider for annual gender health maintenance procedures.  4. Follow up with colonoscopy schedule.  5. Follow up with annual dermatology visits.  6. It doesn't seem like the COVID vaccine is working well in lung transplant patients. A number of lung transplant patients have gotten sick with COVID even after receiving the vaccines.  Based on our recent experience, it can be life-threatening to get COVID  even after being vaccinated. Please continue to act like you did not get the COVID vaccine - social distancing, wearing a mask, good hand hygiene, etc. If the people around you are vaccinated, it will help reduce the risk of you getting COVID. All members of your household should be vaccinated.  7. Do sinus rinses two times a day to help with your sinuses. Use distilled water or boil water to put in bottle.   8. We will extend your course of Augmentin for another week.    9. Try and hydrate a little more- your kidney function is slightly elevated. This is probably because of your dear hunting recently.   10. We are increasing your Imuran to 50mg daily   11. You are getting Evusheld today.       Next transplant clinic appointment: 3-4 months  labs and PFTs for virtual visit (okay for no CXR)   Next lab draw: 2 weeks      AVS printed at time of check out          ~~~~~~~~~~~~~~~~~~~~~~~~~    Thoracic Transplant Office phone [phone], fax [phone]    Office Hours 8:30 - 5:00     For after-hours urgent issues, please dial [phone], and ask to speak with the Thoracic Transplant Coordinator On-Call.  --------------------  To expedite your medication refill(s), please contact your pharmacy and have them fax a refill request to: [phone]  .   *Please allow 3 business days for routine medication refills.  *Please allow 5 business days for  controlled substance medication refills.    **For Diabetic medications and supplies refill(s), please contact your pharmacy and have them contact your Endocrine team.  --------------------  For scheduling appointments call [phone].  --------------------  Please Note: If you are active on [email or web address], all future test results will be sent by [email or web address] message only, and will no longer be called to patient. You may also receive communication directly from your physician.
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
## 2022-11-18 DIAGNOSIS — Z94.2 LUNG REPLACED BY TRANSPLANT (H): Primary | ICD-10-CM

## 2022-11-18 LAB
DLCOCOR-%PRED-PRE: 97 %
DLCOCOR-PRE: 23.94 ML/MIN/MMHG
DLCOUNC-%PRED-PRE: 81 %
DLCOUNC-PRE: 20.02 ML/MIN/MMHG
DLCOUNC-PRED: 24.51 ML/MIN/MMHG
DONOR IDENTIFICATION: NORMAL
DSA COMMENTS: NORMAL
DSA PRESENT: NO
DSA TEST METHOD: NORMAL
EBV DNA # SPEC NAA+PROBE: <500 COPIES/ML
EBV DNA SPEC NAA+PROBE-LOG#: <2.7 {LOG_COPIES}/ML
ERV-%PRED-PRE: 39 %
ERV-PRE: 0.38 L
ERV-PRED: 0.97 L
EXPTIME-PRE: 6.72 SEC
FEF2575-%PRED-PRE: 93 %
FEF2575-PRE: 2.23 L/SEC
FEF2575-PRED: 2.38 L/SEC
FEFMAX-%PRED-PRE: 101 %
FEFMAX-PRE: 8.17 L/SEC
FEFMAX-PRED: 8.01 L/SEC
FEV1-%PRED-PRE: 91 %
FEV1-PRE: 2.8 L
FEV1FEV6-PRE: 77 %
FEV1FEV6-PRED: 78 %
FEV1FVC-PRE: 76 %
FEV1FVC-PRED: 76 %
FEV1SVC-PRE: 74 %
FEV1SVC-PRED: 69 %
FIFMAX-PRE: 8.09 L/SEC
FVC-%PRED-PRE: 91 %
FVC-PRE: 3.68 L
FVC-PRED: 4 L
IC-%PRED-PRE: 98 %
IC-PRE: 3.4 L
IC-PRED: 3.47 L
ORGAN: NORMAL
SA 1 CELL: NORMAL
SA 1 TEST METHOD: NORMAL
SA 2 CELL: NORMAL
SA 2 TEST METHOD: NORMAL
SA1 HI RISK ABY: NORMAL
SA1 MOD RISK ABY: NORMAL
SA2 HI RISK ABY: NORMAL
SA2 MOD RISK ABY: NORMAL
UNACCEPTABLE ANTIGENS: NORMAL
UNOS CPRA: 0
VA-%PRED-PRE: 85 %
VA-PRE: 5.14 L
VC-%PRED-PRE: 85 %
VC-PRE: 3.78 L
VC-PRED: 4.43 L
ZZZSA 1  COMMENTS: NORMAL
ZZZSA 2 COMMENTS: NORMAL

## 2022-11-21 DIAGNOSIS — Z94.2 LUNG REPLACED BY TRANSPLANT (H): ICD-10-CM

## 2022-11-21 RX ORDER — AZATHIOPRINE 50 MG/1
50 TABLET ORAL DAILY
Qty: 30 TABLET | Refills: 11 | Status: SHIPPED | OUTPATIENT
Start: 2022-11-21 | End: 2023-12-15

## 2022-11-22 DIAGNOSIS — E11.9 DIABETES MELLITUS TYPE 2, DIET-CONTROLLED (H): ICD-10-CM

## 2022-11-25 NOTE — TELEPHONE ENCOUNTER
[id number] ([employer]) of [facility] sent Rx request for the following:      Redundant refill request refused: Too soon:    blood glucose (NO BRAND SPECIFIED) test strip 300 strip 1 7/13/2022  No   Sig: USE TO TEST BLOOD GLUCOSE 3TIMES DAILY. Dispense as covered by insurance. DX CODE:E11.9   Sent to pharmacy as: Glucose Blood In Vitro Strip (NO BRAND SPECIFIED)   Class: E-Prescribe   Order: [id number]   E-Prescribing Status: Receipt confirmed by pharmacy (7/13/2022  4:09 PM CDT)     [id number] ([employer]) - [place], [place] - [address] RN .............. 11/25/2022  11:17 AM

## 2022-11-28 ENCOUNTER — MYC MEDICAL ADVICE (OUTPATIENT)
Dept: ORTHOPEDICS | Facility: CLINIC | Age: 67
End: 2022-11-28

## 2022-11-28 DIAGNOSIS — E11.49 TYPE II OR UNSPECIFIED TYPE DIABETES MELLITUS WITH NEUROLOGICAL MANIFESTATIONS, NOT STATED AS UNCONTROLLED(250.60) (H): ICD-10-CM

## 2022-11-28 DIAGNOSIS — L97.912 SKIN ULCER OF RIGHT LOWER LEG WITH FAT LAYER EXPOSED (H): ICD-10-CM

## 2022-11-28 DIAGNOSIS — E11.51 DIABETES MELLITUS WITH PERIPHERAL VASCULAR DISEASE (H): ICD-10-CM

## 2022-11-28 DIAGNOSIS — L97.922 SKIN ULCER OF LEFT LOWER LEG WITH FAT LAYER EXPOSED (H): ICD-10-CM

## 2022-11-28 RX ORDER — CIPROFLOXACIN 500 MG/1
500 TABLET, FILM COATED ORAL 2 TIMES DAILY
Qty: 28 TABLET | Refills: 0 | Status: SHIPPED | OUTPATIENT
Start: 2022-11-28 | End: 2023-02-16

## 2022-11-29 ENCOUNTER — OFFICE VISIT (OUTPATIENT)
Dept: FAMILY MEDICINE | Facility: OTHER | Age: 67
End: 2022-11-29
Attending: NURSE PRACTITIONER
Payer: MEDICARE

## 2022-11-29 VITALS
HEART RATE: 68 BPM | OXYGEN SATURATION: 96 % | TEMPERATURE: 98.1 F | DIASTOLIC BLOOD PRESSURE: 74 MMHG | HEIGHT: 68 IN | WEIGHT: 165 LBS | SYSTOLIC BLOOD PRESSURE: 146 MMHG | BODY MASS INDEX: 25.01 KG/M2 | RESPIRATION RATE: 18 BRPM

## 2022-11-29 DIAGNOSIS — E11.42 TYPE 2 DIABETES MELLITUS WITH DIABETIC POLYNEUROPATHY, WITH LONG-TERM CURRENT USE OF INSULIN (H): Primary | ICD-10-CM

## 2022-11-29 DIAGNOSIS — I10 ESSENTIAL HYPERTENSION: Chronic | ICD-10-CM

## 2022-11-29 DIAGNOSIS — Z79.4 TYPE 2 DIABETES MELLITUS WITH DIABETIC POLYNEUROPATHY, WITH LONG-TERM CURRENT USE OF INSULIN (H): Primary | ICD-10-CM

## 2022-11-29 PROCEDURE — G0463 HOSPITAL OUTPT CLINIC VISIT: HCPCS

## 2022-11-29 PROCEDURE — 99213 OFFICE O/P EST LOW 20 MIN: CPT | Performed by: NURSE PRACTITIONER

## 2022-11-29 RX ORDER — AMLODIPINE BESYLATE 5 MG/1
5 TABLET ORAL DAILY
Qty: 90 TABLET | Refills: 3 | Status: SHIPPED | OUTPATIENT
Start: 2022-11-29 | End: 2023-02-16

## 2022-11-29 ASSESSMENT — PAIN SCALES - GENERAL: PAINLEVEL: NO PAIN (0)

## 2022-11-29 NOTE — NURSING NOTE
Patient presents today for follow up on feet.    Medication Reconciliation Complete    Yohana Barreto LPN  11/29/2022 11:32 AM

## 2022-11-29 NOTE — PROGRESS NOTES
Assessment & Plan   Problem List Items Addressed This Visit        Nervous and Auditory    Type 2 diabetes mellitus with diabetic polyneuropathy, with long-term current use of insulin (H) - Primary    Relevant Orders    Miscellaneous Order for DME - ONLY FOR DME       Circulatory    Essential hypertension (Chronic)    Relevant Medications    amLODIPine (NORVASC) 5 MG tablet   Creatinine levels are slightly elevated.  He did not show improvement in the swelling in his lower extremities with the stopping of amlodipine.  At this time we will have him stop the lisinopril and restart amlodipine 5 mg daily.  Continue to monitor blood pressures at home.    Order for diabetic shoes was placed today.  He does have type 2 diabetes with diabetic polyneuropathy, long-term use of insulin.      Prescription drug management  26 minutes spent on the date of the encounter doing chart review, history and exam, documentation and further activities per the note       No follow-ups on file.    BRANDI Manning [facility]    Albert Burgos is a 67 year old, presenting for the following health issues:  RECHECK (feet)      History of Present Illness       Reason for visit:  Feet    He eats 2-3 servings of fruits and vegetables daily.He consumes 0 sweetened beverage(s) daily.   He is taking medications regularly.     He comes in today for couple concerns.  He has been working with multiple specialties regarding blood clots, diabetic foot ulcers as well as his lung transplant specialty team.  He has noted increased swelling in his legs.  Podiatry has been having him wrap his feet and Ace wraps.  At one point the amlodipine was discontinued thinking this may have been causing swelling although he did not notice any changes in swelling once he stopped this.  He was started lisinopril 10 mg twice daily on top of metoprolol, his blood pressure continues to be elevated even at home.  He is shows me pictures  "today of wounds to bilateral calves, he is currently on ciprofloxacin for management of this.    He is in need of diabetic shoes, does have type 2 diabetes, peripheral neuropathy and recent history of foot ulcer.  Currently foot wounds are healed.  He does continue to have mild swelling to bilateral lower feet.    .  Review of Systems   As noted above      Objective    BP (!) 146/74   Pulse 68   Temp 98.1  F (36.7  C)   Resp 18   Ht 1.727 m (5' 8\")   Wt 74.8 kg (165 lb)   SpO2 96%   BMI 25.09 kg/m    Body mass index is 25.09 kg/m .  Physical Exam   GENERAL: healthy, alert and no distress  NECK: no adenopathy, no asymmetry, masses, or scars and thyroid normal to palpation  RESP: lungs clear to auscultation - no rales, rhonchi or wheezes  CV: regular rate and rhythm, normal S1 S2, no S3 or S4, 1-2+ pitting edema to feet bilaterally  NEURO: Normal strength and tone, mentation intact and speech normal  PSYCH: mentation appears normal, affect normal/bright                    "

## 2022-12-01 ENCOUNTER — LAB (OUTPATIENT)
Dept: LAB | Facility: OTHER | Age: 67
End: 2022-12-01
Attending: INTERNAL MEDICINE
Payer: MEDICARE

## 2022-12-01 DIAGNOSIS — Z94.2 LUNG REPLACED BY TRANSPLANT (H): ICD-10-CM

## 2022-12-01 LAB
ALBUMIN SERPL BCG-MCNC: 4.1 G/DL (ref 3.5–5.2)
ALP SERPL-CCNC: 48 U/L (ref 40–129)
ALT SERPL W P-5'-P-CCNC: 52 U/L (ref 10–50)
ANION GAP SERPL CALCULATED.3IONS-SCNC: 9 MMOL/L (ref 7–15)
AST SERPL W P-5'-P-CCNC: 42 U/L (ref 10–50)
BILIRUB DIRECT SERPL-MCNC: <0.2 MG/DL (ref 0–0.3)
BILIRUB SERPL-MCNC: 0.5 MG/DL
BUN SERPL-MCNC: 28 MG/DL (ref 8–23)
CALCIUM SERPL-MCNC: 8.8 MG/DL (ref 8.8–10.2)
CHLORIDE SERPL-SCNC: 107 MMOL/L (ref 98–107)
CREAT SERPL-MCNC: 1.07 MG/DL (ref 0.67–1.17)
DEPRECATED HCO3 PLAS-SCNC: 26 MMOL/L (ref 22–29)
ERYTHROCYTE [DISTWIDTH] IN BLOOD BY AUTOMATED COUNT: 13.2 % (ref 10–15)
GFR SERPL CREATININE-BSD FRML MDRD: 75 ML/MIN/1.73M2
GLUCOSE SERPL-MCNC: 96 MG/DL (ref 70–99)
HCT VFR BLD AUTO: 35.9 % (ref 40–53)
HGB BLD-MCNC: 11.6 G/DL (ref 13.3–17.7)
MAGNESIUM SERPL-MCNC: 1.9 MG/DL (ref 1.7–2.3)
MCH RBC QN AUTO: 36.3 PG (ref 26.5–33)
MCHC RBC AUTO-ENTMCNC: 32.3 G/DL (ref 31.5–36.5)
MCV RBC AUTO: 112 FL (ref 78–100)
PLATELET # BLD AUTO: 200 10E3/UL (ref 150–450)
POTASSIUM SERPL-SCNC: 4.9 MMOL/L (ref 3.4–5.3)
PROT SERPL-MCNC: 6.2 G/DL (ref 6.4–8.3)
RBC # BLD AUTO: 3.2 10E6/UL (ref 4.4–5.9)
SODIUM SERPL-SCNC: 142 MMOL/L (ref 136–145)
WBC # BLD AUTO: 5.4 10E3/UL (ref 4–11)

## 2022-12-01 PROCEDURE — 36415 COLL VENOUS BLD VENIPUNCTURE: CPT | Mod: ZL

## 2022-12-01 PROCEDURE — 83735 ASSAY OF MAGNESIUM: CPT | Mod: ZL

## 2022-12-01 PROCEDURE — 85027 COMPLETE CBC AUTOMATED: CPT | Mod: ZL

## 2022-12-01 PROCEDURE — 82248 BILIRUBIN DIRECT: CPT | Mod: ZL

## 2022-12-02 LAB — CMV DNA SPEC NAA+PROBE-ACNC: NOT DETECTED IU/ML

## 2022-12-06 ENCOUNTER — MYC MEDICAL ADVICE (OUTPATIENT)
Dept: FAMILY MEDICINE | Facility: OTHER | Age: 67
End: 2022-12-06

## 2022-12-06 ENCOUNTER — TELEPHONE (OUTPATIENT)
Dept: TRANSPLANT | Facility: CLINIC | Age: 67
End: 2022-12-06

## 2022-12-06 ENCOUNTER — ALLIED HEALTH/NURSE VISIT (OUTPATIENT)
Dept: RESEARCH | Facility: CLINIC | Age: 67
End: 2022-12-06
Payer: MEDICARE

## 2022-12-06 DIAGNOSIS — Z00.6 EXAMINATION OF PARTICIPANT IN CLINICAL TRIAL: Primary | ICD-10-CM

## 2022-12-06 NOTE — TELEPHONE ENCOUNTER
Patients wife called: Kyung had two new nodules found on recent CT scan. Is having a follow up PET scan next week at the . Was wondering if she could follow with Dr. Murphy. Explained to patient how Dr. Murphy specializes in CF/lung transplant patients and patient would need to get results of PET scan next week and based off that PCP can send referral to appropriate specialty follow up.

## 2022-12-06 NOTE — TELEPHONE ENCOUNTER
Voicemail    Date/Time: 12/6/2022 @ 10:48AM    Reason for call: Kyung Duncan called in regards of Aubrey Duncan and would like to touch base. Call back number [phone]

## 2022-12-06 NOTE — TELEPHONE ENCOUNTER
Patient Call: General      Reason for call: patient and wife called to touch base with the RNCC that the patient forgot to get his potassium drawn and would like to see if an order can be place to have it drawn at the hospital for tomorrow when he goes in for surgery?    Call back needed? Yes    Return Call Needed  Same as documented in contacts section  When to return call?: Same day: Route High Priority     My signature below certifies that the above stated patient is homebound and upon completion of the Face-To-Face encounter, has the need for intermittent skilled nursing, physical therapy and/or speech or occupational therapy services in their home for their current diagnosis as outlined in their initial plan of care. These services will continue to be monitored by myself or another physician.

## 2022-12-09 ENCOUNTER — MYC MEDICAL ADVICE (OUTPATIENT)
Dept: FAMILY MEDICINE | Facility: OTHER | Age: 67
End: 2022-12-09

## 2022-12-09 VITALS — SYSTOLIC BLOOD PRESSURE: 133 MMHG | DIASTOLIC BLOOD PRESSURE: 66 MMHG

## 2022-12-13 ENCOUNTER — OFFICE VISIT (OUTPATIENT)
Dept: PODIATRY | Facility: CLINIC | Age: 67
End: 2022-12-13
Payer: MEDICARE

## 2022-12-13 DIAGNOSIS — E11.51 DIABETES MELLITUS WITH PERIPHERAL VASCULAR DISEASE (H): Primary | ICD-10-CM

## 2022-12-13 DIAGNOSIS — E11.49 TYPE II OR UNSPECIFIED TYPE DIABETES MELLITUS WITH NEUROLOGICAL MANIFESTATIONS, NOT STATED AS UNCONTROLLED(250.60) (H): ICD-10-CM

## 2022-12-13 DIAGNOSIS — L97.922 SKIN ULCER OF LEFT LOWER LEG WITH FAT LAYER EXPOSED (H): ICD-10-CM

## 2022-12-13 DIAGNOSIS — L97.912 SKIN ULCER OF RIGHT LOWER LEG WITH FAT LAYER EXPOSED (H): ICD-10-CM

## 2022-12-13 DIAGNOSIS — R23.4 SKIN FISSURE: ICD-10-CM

## 2022-12-13 PROCEDURE — 99214 OFFICE O/P EST MOD 30 MIN: CPT | Performed by: PODIATRIST

## 2022-12-13 ASSESSMENT — PAIN SCALES - GENERAL: PAINLEVEL: MILD PAIN (2)

## 2022-12-13 NOTE — NURSING NOTE
"Aubrey Duncan's chief complaint for this visit includes:  Chief Complaint   Patient presents with     Right Lower Leg - WOUND CARE     Left Lower Leg - WOUND CARE     Left Foot - Follow Up     Right Foot - Follow Up, WOUND CARE     PCP: Hoa Olivarez    Referring Provider:  No referring provider defined for this encounter.    There were no vitals taken for this visit.  Mild Pain (2)     Do you need any medication refills at today's visit? NO    Allergies   Allergen Reactions     Heparin Other (See Comments)     HIT positive and AUGUST positive    No Heparin Antibody Identified on 8/15 blood test     Oxycodone Other (See Comments)     Significant lethargy, confusion. Tolerates dilaudid well.      Fluocinolone Other (See Comments)     Tendon problems       Levaquin Muscle Pain (Myalgia)     Pneumococcal Vaccine Other (See Comments)     Other reaction(s): Fever  \"My arm swelled up like a balloon.\"     Varicella Zoster Immune Globulin Swelling       Donovan Plascencia, EMT  "
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
## 2023-01-01 NOTE — TELEPHONE ENCOUNTER
"Requested Prescriptions   Pending Prescriptions Disp Refills     insulin pen needle (32G X 4 MM) 32G X 4 MM miscellaneous 110 each 3     Sig: Use 4 pen needles daily or as directed. Dispense as insurance allows. Dx. Code: E09.9       Diabetic Supplies Protocol Passed - 9/24/2019  2:43 PM        Passed - Medication is active on med list        Passed - Patient is 18 years of age or older        Passed - Recent (6 mo) or future (30 days) visit within the authorizing provider's specialty     Patient had office visit in the last 6 months or has a visit in the next 30 days with authorizing provider.  See \"Patient Info\" tab in inbasket, or \"Choose Columns\" in Meds & Orders section of the refill encounter.            LOV 9/10/19 Prescription approved per [facility] Refill Protocol.  Kenia Rodriguez RN on 9/26/2019 at 9:35 AM    " Statement Selected

## 2023-01-09 DIAGNOSIS — I26.99 ACUTE PULMONARY EMBOLISM, UNSPECIFIED PULMONARY EMBOLISM TYPE, UNSPECIFIED WHETHER ACUTE COR PULMONALE PRESENT (H): ICD-10-CM

## 2023-01-09 DIAGNOSIS — Z94.2 S/P LUNG TRANSPLANT (H): ICD-10-CM

## 2023-01-10 ENCOUNTER — MYC MEDICAL ADVICE (OUTPATIENT)
Dept: FAMILY MEDICINE | Facility: OTHER | Age: 68
End: 2023-01-10

## 2023-01-10 ENCOUNTER — TELEPHONE (OUTPATIENT)
Dept: TRANSPLANT | Facility: CLINIC | Age: 68
End: 2023-01-10

## 2023-01-10 DIAGNOSIS — Z94.2 LUNG REPLACED BY TRANSPLANT (H): ICD-10-CM

## 2023-01-10 RX ORDER — DAPSONE 25 MG/1
50 TABLET ORAL DAILY
Qty: 180 TABLET | Refills: 3 | Status: SHIPPED | OUTPATIENT
Start: 2023-01-10 | End: 2024-01-04

## 2023-01-10 NOTE — TELEPHONE ENCOUNTER
Needs Dapsone 25mg  90 day supply refilled at [id number] ([place]) - [place], [place] - [address]   Phone:  [phone]  Fax:  [phone]

## 2023-01-11 RX ORDER — APIXABAN 5 MG/1
TABLET, FILM COATED ORAL
Qty: 60 TABLET | Refills: 1 | Status: SHIPPED | OUTPATIENT
Start: 2023-01-11 | End: 2023-02-16 | Stop reason: DRUGHIGH

## 2023-01-11 NOTE — TELEPHONE ENCOUNTER
[id number] ([place]) sent Rx request for the following:      Requested Prescriptions   Pending Prescriptions Disp Refills     ELIQUIS ANTICOAGULANT 5 MG tablet [Pharmacy Med Name: ELIQUIS 5MG TABLET] 60 tablet 1     Sig: TAKE 1 TABLET BY MOUTH TWICE A DAY       Direct Oral Anticoagulant Agents Passed - 1/9/2023  4:02 PM        Passed - Normal Platelets on file in past 12 months     Recent Labs   Lab Test 12/01/22  0832 11/14/22  1241 11/09/22  1045      < >  --    83294  --   --  244    < > = values in this interval not displayed.           Passed - Medication is active on med list        Passed - Patient is 18-79 years of age        Passed - Serum creatinine less than or equal to 1.4 on file in past 12 mos     Recent Labs   Lab Test 12/01/22  0832   CR 1.07     Ok to refill medication if creatinine is low          Passed - Weight is greater than 60 kg for the past year     Wt Readings from Last 3 Encounters:   11/29/22 74.8 kg (165 lb)   11/17/22 75.8 kg (167 lb)   11/14/22 80.3 kg (177 lb)           Passed - Recent (6 mo) or future (30 days) visit within the authorizing provider's specialty     Last Prescription Date:   08/16/22  Last Fill Qty/Refills:         60, R-5  Last Office Visit:              11/29/22   Future Office visit:           None    Cristy Chavez, RN on 1/11/2023 at 2:29 PM
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
## 2023-01-17 ENCOUNTER — VIRTUAL VISIT (OUTPATIENT)
Dept: HEMATOLOGY | Facility: CLINIC | Age: 68
End: 2023-01-17
Attending: STUDENT IN AN ORGANIZED HEALTH CARE EDUCATION/TRAINING PROGRAM
Payer: MEDICARE

## 2023-01-17 DIAGNOSIS — I74.3 POPLITEAL ARTERY THROMBOSIS, RIGHT (H): Primary | ICD-10-CM

## 2023-01-17 PROCEDURE — 99214 OFFICE O/P EST MOD 30 MIN: CPT | Mod: 95 | Performed by: STUDENT IN AN ORGANIZED HEALTH CARE EDUCATION/TRAINING PROGRAM

## 2023-01-17 PROCEDURE — G0463 HOSPITAL OUTPT CLINIC VISIT: HCPCS | Mod: PN,GT | Performed by: STUDENT IN AN ORGANIZED HEALTH CARE EDUCATION/TRAINING PROGRAM

## 2023-01-17 NOTE — PROGRESS NOTES
Patient was contacted to complete the pre-visit call prior to their video visit with the provider.      Allergies and medications were reviewed.    I thanked them for their time to cover this information     Carson Huynh CMA

## 2023-01-30 DIAGNOSIS — E11.42 TYPE 2 DIABETES MELLITUS WITH DIABETIC POLYNEUROPATHY, WITH LONG-TERM CURRENT USE OF INSULIN (H): Primary | ICD-10-CM

## 2023-01-30 DIAGNOSIS — Z79.4 TYPE 2 DIABETES MELLITUS WITH DIABETIC POLYNEUROPATHY, WITH LONG-TERM CURRENT USE OF INSULIN (H): Primary | ICD-10-CM

## 2023-02-02 ENCOUNTER — OFFICE VISIT (OUTPATIENT)
Dept: FAMILY MEDICINE | Facility: OTHER | Age: 68
End: 2023-02-02
Attending: NURSE PRACTITIONER
Payer: MEDICARE

## 2023-02-02 VITALS
OXYGEN SATURATION: 99 % | BODY MASS INDEX: 24.8 KG/M2 | DIASTOLIC BLOOD PRESSURE: 64 MMHG | WEIGHT: 158 LBS | HEART RATE: 79 BPM | HEIGHT: 67 IN | RESPIRATION RATE: 16 BRPM | TEMPERATURE: 98.3 F | SYSTOLIC BLOOD PRESSURE: 124 MMHG

## 2023-02-02 DIAGNOSIS — J32.0 RIGHT MAXILLARY SINUSITIS: Primary | ICD-10-CM

## 2023-02-02 PROCEDURE — 99213 OFFICE O/P EST LOW 20 MIN: CPT | Performed by: NURSE PRACTITIONER

## 2023-02-02 PROCEDURE — G0463 HOSPITAL OUTPT CLINIC VISIT: HCPCS

## 2023-02-02 RX ORDER — LISINOPRIL 5 MG/1
TABLET ORAL
COMMUNITY
Start: 2022-12-19 | End: 2023-03-21 | Stop reason: DRUGHIGH

## 2023-02-02 ASSESSMENT — PAIN SCALES - GENERAL: PAINLEVEL: NO PAIN (0)

## 2023-02-02 NOTE — NURSING NOTE
Chief Complaint   Patient presents with     Sinus Problem     X 1 month - worsening     Patient treating with rinses and nasacort spray without much relief.    Advanced Care Planning on file? yes    Medication Review Completed: complete    FOOD SECURITY SCREENING QUESTIONS:    The next two questions are to help us understand your food security.  If you are feeling you need any assistance in this area, we have resources available to support you today.    Hunger Vital Signs:  Within the past 12 months we worried whether our food would run out before we got money to buy more. Never  Within the past 12 months the food we bought just didn't last and we didn't have money to get more. Never    Imelda Abdul LPN

## 2023-02-02 NOTE — PATIENT INSTRUCTIONS
Symptomatic treatment - Encouraged fluids, elevation, humidifier, saline nasal spray, sinus rinse/netti pot, topical vapor rub, steamy shower, etc     Antibiotics as prescribed
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
## 2023-02-08 ENCOUNTER — HOSPITAL ENCOUNTER (OUTPATIENT)
Dept: RESPIRATORY THERAPY | Facility: OTHER | Age: 68
Discharge: HOME | End: 2023-02-08
Attending: INTERNAL MEDICINE
Payer: MEDICARE

## 2023-02-08 ENCOUNTER — LAB (OUTPATIENT)
Dept: LAB | Facility: OTHER | Age: 68
End: 2023-02-08
Attending: INTERNAL MEDICINE
Payer: MEDICARE

## 2023-02-08 DIAGNOSIS — Z94.2 LUNG REPLACED BY TRANSPLANT (H): ICD-10-CM

## 2023-02-08 LAB
ANION GAP SERPL CALCULATED.3IONS-SCNC: 7 MMOL/L (ref 7–15)
BUN SERPL-MCNC: 23.2 MG/DL (ref 8–23)
CALCIUM SERPL-MCNC: 8.6 MG/DL (ref 8.8–10.2)
CHLORIDE SERPL-SCNC: 106 MMOL/L (ref 98–107)
CREAT SERPL-MCNC: 0.97 MG/DL (ref 0.67–1.17)
DEPRECATED HCO3 PLAS-SCNC: 28 MMOL/L (ref 22–29)
ERYTHROCYTE [DISTWIDTH] IN BLOOD BY AUTOMATED COUNT: 15.7 % (ref 10–15)
GFR SERPL CREATININE-BSD FRML MDRD: 85 ML/MIN/1.73M2
GLUCOSE SERPL-MCNC: 96 MG/DL (ref 70–99)
HCT VFR BLD AUTO: 34.1 % (ref 40–53)
HGB BLD-MCNC: 11 G/DL (ref 13.3–17.7)
HOLD SPECIMEN: NORMAL
MAGNESIUM SERPL-MCNC: 1.7 MG/DL (ref 1.7–2.3)
MCH RBC QN AUTO: 35.1 PG (ref 26.5–33)
MCHC RBC AUTO-ENTMCNC: 32.3 G/DL (ref 31.5–36.5)
MCV RBC AUTO: 109 FL (ref 78–100)
PLATELET # BLD AUTO: 184 10E3/UL (ref 150–450)
POTASSIUM SERPL-SCNC: 4.5 MMOL/L (ref 3.4–5.3)
RBC # BLD AUTO: 3.13 10E6/UL (ref 4.4–5.9)
SODIUM SERPL-SCNC: 141 MMOL/L (ref 136–145)
WBC # BLD AUTO: 6.6 10E3/UL (ref 4–11)

## 2023-02-08 PROCEDURE — 83735 ASSAY OF MAGNESIUM: CPT | Mod: ZL

## 2023-02-08 PROCEDURE — 94010 BREATHING CAPACITY TEST: CPT | Mod: 26 | Performed by: INTERNAL MEDICINE

## 2023-02-08 PROCEDURE — 999N000157 HC STATISTIC RCP TIME EA 10 MIN

## 2023-02-08 PROCEDURE — 36415 COLL VENOUS BLD VENIPUNCTURE: CPT | Mod: ZL

## 2023-02-08 PROCEDURE — 82947 ASSAY GLUCOSE BLOOD QUANT: CPT | Mod: ZL

## 2023-02-08 PROCEDURE — 85027 COMPLETE CBC AUTOMATED: CPT | Mod: ZL

## 2023-02-08 PROCEDURE — 94010 BREATHING CAPACITY TEST: CPT

## 2023-02-08 PROCEDURE — 80197 ASSAY OF TACROLIMUS: CPT | Mod: ZL

## 2023-02-09 LAB
CMV DNA SPEC NAA+PROBE-ACNC: NOT DETECTED IU/ML
TACROLIMUS BLD-MCNC: 7.8 UG/L (ref 5–15)
TME LAST DOSE: NORMAL H
TME LAST DOSE: NORMAL H

## 2023-02-09 NOTE — RESULT ENCOUNTER NOTE
Tacrolimus level 7.8 at 11.5 hours, on 2/8/23.  Goal 8-10.   Current dose 2 mg in AM, 2 mg in PM    Level just below goal.  No dose change.    Purplle message sent
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
## 2023-02-16 ENCOUNTER — VIRTUAL VISIT (OUTPATIENT)
Dept: TRANSPLANT | Facility: CLINIC | Age: 68
End: 2023-02-16
Attending: INTERNAL MEDICINE
Payer: MEDICARE

## 2023-02-16 DIAGNOSIS — Z94.2 S/P LUNG TRANSPLANT (H): Primary | ICD-10-CM

## 2023-02-16 DIAGNOSIS — Z79.899 ENCOUNTER FOR LONG-TERM (CURRENT) USE OF HIGH-RISK MEDICATION: ICD-10-CM

## 2023-02-16 DIAGNOSIS — T86.810 CHRONIC REJECTION OF ALLOGRAFT LUNG (H): ICD-10-CM

## 2023-02-16 DIAGNOSIS — I10 ESSENTIAL HYPERTENSION: Chronic | ICD-10-CM

## 2023-02-16 DIAGNOSIS — R06.2 WHEEZING: ICD-10-CM

## 2023-02-16 PROCEDURE — 99215 OFFICE O/P EST HI 40 MIN: CPT | Mod: VID | Performed by: INTERNAL MEDICINE

## 2023-02-16 RX ORDER — AMLODIPINE BESYLATE 5 MG/1
10 TABLET ORAL DAILY
Qty: 90 TABLET | Refills: 3 | Status: SHIPPED | OUTPATIENT
Start: 2023-02-16 | End: 2023-06-08

## 2023-02-16 RX ORDER — ALBUTEROL SULFATE 90 UG/1
1-2 AEROSOL, METERED RESPIRATORY (INHALATION) EVERY 6 HOURS PRN
Qty: 8.5 G | Refills: 3 | Status: SHIPPED | OUTPATIENT
Start: 2023-02-16

## 2023-02-16 RX ORDER — FLUTICASONE PROPIONATE AND SALMETEROL XINAFOATE 230; 21 UG/1; UG/1
2 AEROSOL, METERED RESPIRATORY (INHALATION) 2 TIMES DAILY
Qty: 8 G | Refills: 11 | Status: SHIPPED | OUTPATIENT
Start: 2023-02-16 | End: 2024-02-29

## 2023-02-16 ASSESSMENT — PAIN SCALES - GENERAL: PAINLEVEL: NO PAIN (0)

## 2023-02-16 NOTE — PATIENT INSTRUCTIONS
~~~~~~~~~~~~~~~~~~~~~~~~~    Thoracic Transplant Office phone [phone], fax [phone]    Office Hours 8:30 - 5:00     For after-hours urgent issues, please dial [phone], and ask to speak with the Thoracic Transplant Coordinator On-Call.  --------------------  To expedite your medication refill(s), please contact your pharmacy and have them fax a refill request to: [phone]  .   *Please allow 3 business days for routine medication refills.  *Please allow 5 business days for controlled substance medication refills.    **For Diabetic medications and supplies refill(s), please contact your pharmacy and have them contact your Endocrine team.  --------------------  For scheduling appointments call [phone].  --------------------  Please Note: If you are active on [employer], all future test results will be sent by [employer] message only, and will no longer be called to patient. You may also receive communication directly from your physician.
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
## 2023-02-16 NOTE — PROGRESS NOTES
"Aubrey is a 67 year old who is being evaluated via a billable video visit.      How would you like to obtain your AVS? MyChart  If the video visit is dropped, the invitation should be resent by: Send to e-mail at: [email or web address]  Will anyone else be joining your video visit? No  {If patient encounters technical issues they should call [phone] :[id number]}      Video-Visit Details    Type of service:  Video Visit   Video Start Time: {video visit start/end time for provider to select:321861}  Video End Time:{video visit start/end time for provider to select:661677}    Originating Location (pt. Location): {video visit patient location:150770::\"Home\"}  {PROVIDER LOCATION On-site should be selected for visits conducted from your clinic location or adjoining [facility] hospital, academic office, or other nearby [facility] building. Off-site should be selected for all other provider locations, including home:[id number]}  Distant Location (provider location):  {virtual location provider:503750}  Platform used for Video Visit: {Virtual Visit Platforms:430771::\"[employer]\"}      Transplant Coordinator Note    Reason for visit: Post lung transplant follow up visit   Coordinator: Present (via video)  Caregiver:      Health concerns addressed today:  1. ***  2. ***  3. ***     Activity/rehab: Up ad aviva  Oxygen needs: Room air  Pain management/RX: PRN tylenol for foot pain  Diabetic management: Managed by PCP  DVT/PE: apixaban 2.5 mg daily  AC/asa: aspirin 81 mg  PJP prophylactic: bactrim     COVID:  1. COVID-19 infection (yes/no, date of most recent positive test):   2. Status/instructions given about COVID-19 vaccine: last covid vaccine on 9/22/22  3. Evusheld: last 5/26/22     Pt Education: medications (use/dose/side effects), how/when to call coordinator, frequency of labs, s/s of infection/rejection, call prior to starting any new medications, lab/vital sign book    Health Maintenance:     Last colonoscopy:     Next colonoscopy due: "     Dermatology:    Vaccinations this visit:     Labs, CXR, PFTs reviewed with patient  Medication record reviewed and reconciled  Questions and concerns addressed    Patient Instructions  1. Continue to hydrate with 60-70 oz fluids daily.  2. Continue to exercise daily or most days of the week.  3. Follow up with your primary care provider for annual gender health maintenance procedures.  4. Follow up with colonoscopy schedule.  5. Follow up with annual dermatology visits.  6. It doesn't seem like the COVID vaccine is working well in lung transplant patients. A number of lung transplant patients have gotten sick with COVID even after receiving the vaccines.  Based on our recent experience, it can be life-threatening to get COVID  even after being vaccinated. Please continue to act like you did not get the COVID vaccine - social distancing, wearing a mask, good hand hygiene, etc. If the people around you are vaccinated, it will help reduce the risk of you getting COVID. All members of your household should be vaccinated.  7.     Next transplant clinic appointment:  3 months with CXR, labs and PFTs  Next lab draw:       AVS printed at time of check out

## 2023-02-22 ENCOUNTER — VIRTUAL VISIT (OUTPATIENT)
Dept: VASCULAR SURGERY | Facility: CLINIC | Age: 68
End: 2023-02-22
Payer: MEDICARE

## 2023-02-22 DIAGNOSIS — I73.9 PAD (PERIPHERAL ARTERY DISEASE) (H): Primary | ICD-10-CM

## 2023-02-22 DIAGNOSIS — I70.219 INTERMITTENT CLAUDICATION DUE TO ATHEROSCLEROSIS OF ARTERY OF EXTREMITY (H): ICD-10-CM

## 2023-02-22 PROCEDURE — 99215 OFFICE O/P EST HI 40 MIN: CPT | Mod: VID | Performed by: SURGERY

## 2023-02-22 RX ORDER — CLOPIDOGREL BISULFATE 75 MG/1
75 TABLET ORAL DAILY
Qty: 90 TABLET | Refills: 3 | Status: SHIPPED | OUTPATIENT
Start: 2023-02-22 | End: 2024-02-14

## 2023-02-22 NOTE — NURSING NOTE
Chief Complaint   Patient presents with     Follow Up       Patient confirms medications and allergies are accurate via patients echeck in completion, and or denies any changes since last reviewed/verified.       Is the patient currently in the state of [place]? YES    Visit mode:VIDEO    If the visit is dropped, the patient can be reconnected by: VIDEO VISIT: Text to cell phone: [phone]    Will anyone else be joining the visit? NO      How would you like to obtain your AVS? MyChart    Are changes needed to the allergy or medication list? NO

## 2023-02-22 NOTE — PATIENT INSTRUCTIONS
Thank you so much for choosing us for your care. It was a pleasure to see you at the vascular clinic today.     Follow-up recommendations: We will see you back in 6 months. Please do not hesitate to reach out to us in the meantime with any concerns. Our schedulers will get in touch with you to set up your follow-up visit.    You may stop your Xarelto. Please start Plavix as ordered by Dr Boyd. Rx has been sent to your pharmacy    Additional testing/imaging ordered today: None      Our scheduling team will get in touch with you to set up any follow-up testing/imaging and/or appointments. Please be aware that any testing/imaging recommended today will need to completed prior to your next visit with the provider. If testing/imaging is not completed prior to your next visit, your visit may be rescheduled.        If you have any questions, please contact our clinic directly at [phone] and ask for the nurse. We also encourage the use of BrieFix to communicate with your HealthCare Provider.      If you have an urgent need after business hours (8:00 am to 4:30 pm) please call [phone], option 4, and ask for the vascular attending on call. For non-urgent after hours needs, please call the vascular clinic at [phone]. For scheduling needs, please call our clinic directly at [phone].

## 2023-02-22 NOTE — LETTER
2/22/2023       RE: Aubrey Duncan  [address]  [address] [place] [address]  [facility] [address]     Dear Colleague,    Thank you for referring your patient, Aubrey Duncan, to the [facility] VASCULAR CLINIC SERVIN at [facility]. Please see a copy of my visit note below.      Vascular Surgery Consultation Note     Patient:  Aubrey Duncan   Date of birth 1955, Medical record number [id number]  Date of Visit:  02/22/2023  Consult Requester:No att. providers found            Assessment and Recommendations:   ASSESSMENT / RECOMMENDATION:    68 y/o lung transplant patient with non-reconstructable peripheral arterial disease. Asked him to refrain from wrapping his foot as it is causing rubbing and potentially trauma to toes which unfortunately can easily lead to breakdown and ulceration and subsequent amputation. Encouraged him to follow up with Dr Rosales of Podiatry. We will check in with a visit in 6 months.    Many thanks for involving me in the care of this very pleasant patient. Should any questions or concerns arise, please don't hesitate to contact me.    Warm Regards,    Vanda Boyd MD, DFSVS, RPVI  Director, [facility] Vascular Services  Professor and Chief, Vascular and Endovascular Surgery  [facility]  Pager: 1. Send message or 10 digit call back number Securely via V[employer]-Chi with the Vocera Web Console (learn more here)              2. Outside of [facility]? Call [phone]        45 minutes spent on the date of the encounter doing chart review, review of outside records, review of test results, interpretation of tests, patient visit and documentation           HPI: Following up today in regard to RLE. Had arteriogram we did in past with no revascularization options below the knee but many collaterals. Has been maintained on Compass therapy of Xarelto 2.5mg BID and aspirin 81mg. Has struggled financially with ability to cover  Xarelto. Off full anticoagulation for PE. Has recently noted some discoloration of right fifth toe - bruised appearance. Wraps right foot and lower leg for swelling. Follows with Dr Rosales of Podiatry - hasn't seen recently.       Review of Systems   Constitutional, HEENT, cardiovascular, pulmonary, gi and gu systems are negative, except as otherwise noted.    Physical Exam   GENERAL: Healthy, alert and no distress  EYES: Eyes grossly normal to inspection.  No discharge or erythema, or obvious scleral/conjunctival abnormalities.  RESP: No audible wheeze, cough, or visible cyanosis.  No visible retractions or increased work of breathing.    SKIN: Visible skin clear. No significant rash, abnormal pigmentation or lesions.  NEURO: Cranial nerves grossly intact.  Mentation and speech appropriate for age.  PSYCH: Mentation appears normal, affect normal/bright, judgement and insight intact, normal speech and appearance well-groomed.    Imaging: no new    Video Start Time: 1130  Video End Time: 1200      Sincerely,    Vanda Boyd MD

## 2023-02-22 NOTE — PROGRESS NOTES
Vascular Surgery Consultation Note     Patient:  Aubrey Duncan   Date of birth 1955, Medical record number [id number]  Date of Visit:  02/22/2023  Consult Requester:No att. providers found            Assessment and Recommendations:   ASSESSMENT / RECOMMENDATION:    68 y/o lung transplant patient with non-reconstructable peripheral arterial disease. Asked him to refrain from wrapping his foot as it is causing rubbing and potentially trauma to toes which unfortunately can easily lead to breakdown and ulceration and subsequent amputation. Encouraged him to follow up with Dr Rosales of Podiatry. We will check in with a visit in 6 months.    Many thanks for involving me in the care of this very pleasant patient. Should any questions or concerns arise, please don't hesitate to contact me.    Warm Regards,    Vanda Boyd MD, DFSVS, RPVI  Director, [facility] Vascular Services  Professor and Chief, Vascular and Endovascular Surgery  [facility]  Pager: 1. Send message or 10 digit call back number Securely via [employer] with the Vocera Web Console (learn more here)              2. Outside of [facility]? Call [phone]        45 minutes spent on the date of the encounter doing chart review, review of outside records, review of test results, interpretation of tests, patient visit and documentation           HPI: Following up today in regard to RLE. Had arteriogram we did in past with no revascularization options below the knee but many collaterals. Has been maintained on Compass therapy of Xarelto 2.5mg BID and aspirin 81mg. Has struggled financially with ability to cover Xarelto. Off full anticoagulation for PE. Has recently noted some discoloration of right fifth toe - bruised appearance. Wraps right foot and lower leg for swelling. Follows with Dr Rosales of Podiatry - hasn't seen recently.       Review of Systems   Constitutional, HEENT, cardiovascular, pulmonary, gi and gu systems are  negative, except as otherwise noted.    Physical Exam   GENERAL: Healthy, alert and no distress  EYES: Eyes grossly normal to inspection.  No discharge or erythema, or obvious scleral/conjunctival abnormalities.  RESP: No audible wheeze, cough, or visible cyanosis.  No visible retractions or increased work of breathing.    SKIN: Visible skin clear. No significant rash, abnormal pigmentation or lesions.  NEURO: Cranial nerves grossly intact.  Mentation and speech appropriate for age.  PSYCH: Mentation appears normal, affect normal/bright, judgement and insight intact, normal speech and appearance well-groomed.    Imaging: no new    Video Start Time: 1130  Video End Time: 1200        Aubrey is a 67 year old who is being evaluated via a billable video visit.      How would you like to obtain your AVS? MyChart  If the video visit is dropped, the invitation should be resent by: Text to cell phone: [phone]  Will anyone else be joining your video visit? No Kyung      Video-Visit Details    Originating Location (pt. Location): Home    Platform used for Video Visit: CareXtend

## 2023-03-01 DIAGNOSIS — E11.49 TYPE II OR UNSPECIFIED TYPE DIABETES MELLITUS WITH NEUROLOGICAL MANIFESTATIONS, NOT STATED AS UNCONTROLLED(250.60) (H): ICD-10-CM

## 2023-03-01 DIAGNOSIS — R23.4 SKIN FISSURE: ICD-10-CM

## 2023-03-01 DIAGNOSIS — E11.51 DIABETES MELLITUS WITH PERIPHERAL VASCULAR DISEASE (H): Primary | ICD-10-CM

## 2023-03-01 RX ORDER — CEPHALEXIN 500 MG/1
500 CAPSULE ORAL 2 TIMES DAILY
Qty: 28 CAPSULE | Refills: 0 | Status: SHIPPED | OUTPATIENT
Start: 2023-03-01 | End: 2023-03-21

## 2023-03-01 NOTE — PROGRESS NOTES
Patent sent pictures of foot, concern is fissure with signs of infection.  Responded to patient and prescription for Keflex given.  Follow up in clinic within 2 weeks.

## 2023-03-13 ENCOUNTER — OFFICE VISIT (OUTPATIENT)
Dept: ORTHOPEDICS | Facility: CLINIC | Age: 68
End: 2023-03-13
Payer: MEDICARE

## 2023-03-13 DIAGNOSIS — E11.49 TYPE II OR UNSPECIFIED TYPE DIABETES MELLITUS WITH NEUROLOGICAL MANIFESTATIONS, NOT STATED AS UNCONTROLLED(250.60) (H): ICD-10-CM

## 2023-03-13 DIAGNOSIS — R60.0 PERIPHERAL EDEMA: ICD-10-CM

## 2023-03-13 DIAGNOSIS — E11.51 DIABETES MELLITUS WITH PERIPHERAL VASCULAR DISEASE (H): Primary | ICD-10-CM

## 2023-03-13 PROCEDURE — 99214 OFFICE O/P EST MOD 30 MIN: CPT | Performed by: PODIATRIST
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
## 2023-03-21 ENCOUNTER — OFFICE VISIT (OUTPATIENT)
Dept: FAMILY MEDICINE | Facility: OTHER | Age: 68
End: 2023-03-21
Attending: NURSE PRACTITIONER
Payer: MEDICARE

## 2023-03-21 VITALS
WEIGHT: 169.2 LBS | OXYGEN SATURATION: 97 % | TEMPERATURE: 97.4 F | RESPIRATION RATE: 20 BRPM | SYSTOLIC BLOOD PRESSURE: 138 MMHG | DIASTOLIC BLOOD PRESSURE: 76 MMHG | BODY MASS INDEX: 26.56 KG/M2 | HEART RATE: 80 BPM | HEIGHT: 67 IN

## 2023-03-21 DIAGNOSIS — D84.821 IMMUNODEFICIENCY DUE TO DRUGS (CODE) (H): ICD-10-CM

## 2023-03-21 DIAGNOSIS — N52.9 ERECTILE DYSFUNCTION, UNSPECIFIED ERECTILE DYSFUNCTION TYPE: ICD-10-CM

## 2023-03-21 DIAGNOSIS — Z94.2 S/P LUNG TRANSPLANT (H): Chronic | ICD-10-CM

## 2023-03-21 DIAGNOSIS — J22 LOWER RESPIRATORY INFECTION (E.G., BRONCHITIS, PNEUMONIA, PNEUMONITIS, PULMONITIS): ICD-10-CM

## 2023-03-21 DIAGNOSIS — I10 ESSENTIAL HYPERTENSION: Primary | Chronic | ICD-10-CM

## 2023-03-21 DIAGNOSIS — Z79.4 TYPE 2 DIABETES MELLITUS WITH DIABETIC POLYNEUROPATHY, WITH LONG-TERM CURRENT USE OF INSULIN (H): ICD-10-CM

## 2023-03-21 DIAGNOSIS — E88.01 ALPHA-1-ANTITRYPSIN DEFICIENCY (H): ICD-10-CM

## 2023-03-21 DIAGNOSIS — E11.42 TYPE 2 DIABETES MELLITUS WITH DIABETIC POLYNEUROPATHY, WITH LONG-TERM CURRENT USE OF INSULIN (H): ICD-10-CM

## 2023-03-21 DIAGNOSIS — N18.32 CHRONIC KIDNEY DISEASE, STAGE 3B (H): ICD-10-CM

## 2023-03-21 DIAGNOSIS — J44.9 CHRONIC OBSTRUCTIVE PULMONARY DISEASE, UNSPECIFIED COPD TYPE (H): ICD-10-CM

## 2023-03-21 PROCEDURE — 99214 OFFICE O/P EST MOD 30 MIN: CPT | Performed by: NURSE PRACTITIONER

## 2023-03-21 PROCEDURE — G0463 HOSPITAL OUTPT CLINIC VISIT: HCPCS

## 2023-03-21 RX ORDER — DOXYCYCLINE 100 MG/1
100 CAPSULE ORAL 2 TIMES DAILY
Qty: 20 CAPSULE | Refills: 0 | Status: SHIPPED | OUTPATIENT
Start: 2023-03-21 | End: 2023-03-31

## 2023-03-21 RX ORDER — LISINOPRIL 20 MG/1
20 TABLET ORAL DAILY
Qty: 90 TABLET | Refills: 3 | Status: SHIPPED | OUTPATIENT
Start: 2023-03-21 | End: 2023-05-25

## 2023-03-21 ASSESSMENT — ENCOUNTER SYMPTOMS: COUGH: 1

## 2023-03-21 NOTE — PROGRESS NOTES
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  Has been working with podiatry due to calluses and peripheral neuropathy as well as ulcerations.  Referred for diabetic shoes and diabetic inserts.    Continue with Current COPD management, will treat with doxycycline twice daily for 10 days for lower respiratory infection especially in light of immunocompromise status, history of lung transplant.  Increase lisinopril to 20 mg daily.  He will continue to monitor blood pressures and follow-up in clinic if these are not within goal.  May use viagra up to 100 mg daily, f/u as needed.     Ordering of each unique test  Prescription drug management  32 minutes spent on the date of the encounter doing chart review, history and exam, documentation and further activities per the note       No follow-ups on file.    BRANDI [facility]    Albert Burgos is a 67 year old, presenting for the following health issues:  Cough      Cough  Pertinent negatives include no shortness of breath and no wheezing.      Comes in  "today for evaluation of cough and respiratory concerns.  He was seen in rapid clinic on February 2 and treated for acute sinusitis with Augmentin.  He reports his sinus symptoms did improve but the cough has continued.  This is worsened over the past 1 to 2 weeks, productive cough.  Denies any chest pains or shortness of breath, no fevers.  He has been around his daughter and granddaughter recently, they do have cold symptoms but developed this after he was with them.  He currently takes azithromycin 3 times a week for prevention due to status post lung transplant, COPD and alpha-1 antitrypsin deficiency.  He was started on Advair and albuterol inhaler recently, has not noticed any significant changes in symptoms related to this.  Blood pressure is elevated, he brings in his readings today and these been running in the 140s to 150s over 80s to 90s.  He is currently taking lisinopril 10 mg daily, metoprolol 50 mg twice daily and amlodipine 10 mg daily.  Diabetes has been fairly well controlled.  He does have concerns about erectile dysfunction.  He is currently taking Viagra 25 to 50 mg daily.  He is wondering if this dose can be increased.      Review of Systems   Constitutional: Negative.    HENT: Negative.    Eyes: Negative.    Respiratory: Positive for cough. Negative for shortness of breath and wheezing.    Cardiovascular: Negative.    Genitourinary: Positive for impotence.            Objective    /76   Pulse 80   Temp 97.4  F (36.3  C)   Resp 20   Ht 1.702 m (5' 7\")   Wt 76.7 kg (169 lb 3.2 oz)   SpO2 97%   BMI 26.50 kg/m    Body mass index is 26.5 kg/m .  Physical Exam   GENERAL: healthy, alert and no distress  EYES: Eyes grossly normal to inspection, PERRL and conjunctivae and sclerae normal  HENT: ear canals and TM's normal, nose and mouth without ulcers or lesions  NECK: no adenopathy, no asymmetry, masses, or scars and thyroid normal to palpation  RESP: lungs clear to auscultation - no rales, " rhonchi or wheezes  CV: regular rate and rhythm, normal S1 S2,  NEURO: Normal strength and tone, mentation intact and speech normal  PSYCH: mentation appears normal, affect normal/bright

## 2023-03-21 NOTE — NURSING NOTE
Patient presents today for cough and chest congestion over the last 1-2 weeks.        Medication Reconciliation Complete    Yohana Barreto LPN  3/21/2023 9:16 AM

## 2023-03-22 PROBLEM — D84.821 IMMUNODEFICIENCY DUE TO DRUGS (CODE) (H): Status: ACTIVE | Noted: 2023-03-22

## 2023-03-22 ASSESSMENT — ENCOUNTER SYMPTOMS
WHEEZING: 0
EYES NEGATIVE: 1
SHORTNESS OF BREATH: 0
CONSTITUTIONAL NEGATIVE: 1
CARDIOVASCULAR NEGATIVE: 1
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
## 2023-03-29 ENCOUNTER — MYC MEDICAL ADVICE (OUTPATIENT)
Dept: FAMILY MEDICINE | Facility: OTHER | Age: 68
End: 2023-03-29
Payer: MEDICARE

## 2023-03-30 ENCOUNTER — OFFICE VISIT (OUTPATIENT)
Dept: FAMILY MEDICINE | Facility: OTHER | Age: 68
End: 2023-03-30
Attending: NURSE PRACTITIONER
Payer: MEDICARE

## 2023-03-30 VITALS
OXYGEN SATURATION: 96 % | RESPIRATION RATE: 20 BRPM | BODY MASS INDEX: 26.68 KG/M2 | SYSTOLIC BLOOD PRESSURE: 130 MMHG | HEART RATE: 76 BPM | WEIGHT: 170 LBS | HEIGHT: 67 IN | TEMPERATURE: 98.4 F | DIASTOLIC BLOOD PRESSURE: 78 MMHG

## 2023-03-30 DIAGNOSIS — I10 ESSENTIAL HYPERTENSION: Primary | Chronic | ICD-10-CM

## 2023-03-30 DIAGNOSIS — J32.4 CHRONIC PANSINUSITIS: ICD-10-CM

## 2023-03-30 PROCEDURE — G0463 HOSPITAL OUTPT CLINIC VISIT: HCPCS

## 2023-03-30 PROCEDURE — 99213 OFFICE O/P EST LOW 20 MIN: CPT | Performed by: NURSE PRACTITIONER

## 2023-03-30 ASSESSMENT — PAIN SCALES - GENERAL: PAINLEVEL: NO PAIN (0)

## 2023-03-30 NOTE — NURSING NOTE
Patient presents today for follow up on sinuses and lungs. Patient has continued to monitor his blood pressure at home.    Medication Reconciliation Complete    Yohana Barreto LPN  3/30/2023 9:02 AM

## 2023-03-30 NOTE — PROGRESS NOTES
"  Assessment & Plan   Problem List Items Addressed This Visit        Circulatory    Essential hypertension - Primary (Chronic)   Other Visit Diagnoses     Chronic pansinusitis             He does report home blood pressures have been greater than goal.  At this time he will do a trial of increasing lisinopril to 40 mg daily.  He will let me know via [employer] next week how this is going and we will make adjustments further if needed.  Continue with symptomatic management, no signs of bacterial infection.  Recommend follow-up with ENT as scheduled for chronic sinusitis.    Prescription drug management  23 minutes spent by me on the date of the encounter doing chart review, history and exam, documentation and further activities per the note       No follow-ups on file.    BRANDI Manning CNP  [facility]    Subjective   Aubrey is a 67 year old, presenting for the following health issues:  RECHECK  No flowsheet data found.  HPI     He comes in today for evaluation of plugged ears.  He has had some mild congestion and scratchy throat.  He was placed on doxycycline 9 days ago, has 1 pill left.  Has used Flonase without help with ears, Nasacort was not helpful.  Continues to use sinus rinses daily.  Sudafed for 3 days was not helpful.  Denies any fevers.  He does have an appointment with ENT next week.  Blood pressure continues to be elevated with home checks, greater than 140/90 more times than not.  No changes to blood pressure medications recently.      Review of Systems   See above      Objective    /78   Pulse 76   Temp 98.4  F (36.9  C)   Resp 20   Ht 1.702 m (5' 7\")   Wt 77.1 kg (170 lb)   SpO2 96%   BMI 26.63 kg/m    Body mass index is 26.63 kg/m .  Physical Exam   GENERAL: healthy, alert and no distress  EYES: Eyes grossly normal to inspection, PERRL and conjunctivae and sclerae normal  HENT: ear canals and TM's normal with small amount of clear fluid noted behind, nose and " mouth without ulcers or lesions  NECK: no adenopathy, no asymmetry, masses, or scars and thyroid normal to palpation  RESP: lungs clear to auscultation - no rales, rhonchi or wheezes  CV: regular rate and rhythm, normal S1 S2  NEURO: Normal strength and tone, mentation intact and speech normal  PSYCH: mentation appears normal, affect normal/bright

## 2023-04-04 ENCOUNTER — OFFICE VISIT (OUTPATIENT)
Dept: OTOLARYNGOLOGY | Facility: OTHER | Age: 68
End: 2023-04-04
Attending: OTOLARYNGOLOGY
Payer: MEDICARE

## 2023-04-04 DIAGNOSIS — J32.9 CHRONIC SINUSITIS, UNSPECIFIED LOCATION: Primary | ICD-10-CM

## 2023-04-04 DIAGNOSIS — H65.92 OME (OTITIS MEDIA WITH EFFUSION), LEFT: ICD-10-CM

## 2023-04-04 PROCEDURE — G0463 HOSPITAL OUTPT CLINIC VISIT: HCPCS

## 2023-04-21 NOTE — PROGRESS NOTES
document embedded image  Patient Name: Aubrey Duncan    Address: [address] [place] [address] [address]    YOB: 1955    [place] [address]    MR Number: [id number]    Phone: [phone]  PCP: UNKNOWN            Appointment Date: 04/04/23   Visit Provider: Levar Gill MD    cc: UNKNOWN; ~    ENT Progress Note  Intake  Visit Reasons: Nose Sores    HPI  History of Present Illness  Chief complaint:  Chronic sinusitis, stuffy ears    History  The patient is a 67-year-old man who is immunosuppressed following lung transplant a number of years ago.  Presents to the office today for assistance with chronic lingering sinus inflammation and ear discomfort with hearing loss.  He is had for couple of months.  Was started on a nasal steroid spray but only used it for couple of weeks.  He has been on nasal saline irrigations a past but is no longer using these.  He has been tried on doxycycline as well as Augmentin without lasting relief of symptoms.  In addition to midfacial pressure and a plugged nose, he has purulence nasal drainage.  He complains of plugged ears with decreased hearing as well.    Exam  Nasal-his septum bows to the right anteriorly.  There is no purulence there are no polyps noted at this time.  His nasal membranes are inflamed and boggy.  Oral cavity oropharynx-free of mucosal lesions or inflammation  The external auditory canals are clear bilaterally.  The left tympanic membrane shows an fernando effusion.  There is no effusion noted on the right.   Tuning forks are consistent with a conductive hearing loss in his left ear.   Neck-no masses or adenopathy  Head neck integument-Clear  General-the patient appears well and in no distress  Neuro-there are no focal cranial nerve deficits    Allergies    fluocinolone acetonide Allergy (Verified 04/05/23 10:20)  Unknown  heparin Allergy (Verified 04/05/23 10:20)  Unknown  Influenza Virus Vaccines Allergy (Verified 04/05/23 10:20)  Rash  pneumococcal vaccine Allergy  (Verified 04/05/23 10:20)  Rash  vaccine adjuvant system, AS01B liposomal [From Shingrix (PF)] Allergy (Verified 04/05/23 10:20)  Rash  varicella-zoster virus glycoprotein E, recombinant [From Shingrix (PF)] Allergy (Verified 04/05/23 10:20)  Rash  levofloxacin [From Levaquin] Adverse Reaction (Verified 04/05/23 10:20)  Muscle Pain  oxycodone Adverse Reaction (Verified 04/05/23 10:20)  Hallucinating    PFSH  PFSH:     Past Medical History: (Updated 04/05/23 @ 10:20 by Latesha Walsh, Med Assist)    Diabetes  Hypertension  Lung disease    Past Surgical History: (Reviewed 04/05/23 @ 10:20 by Latesha Walsh Med Assist)    Lung transplant status, bilateral     Social History: (Updated 04/05/23 @ 10:21 by Latesha Walsh Med Assist)  Smoking Status:  Former smoker  second hand exposure:  No  alcohol intake:  former  substance use type:  does not use       A&P  Assessment & Plan  (1) Chronic sinusitis:        Status: Acute        Code(s):  J32.9 - Chronic sinusitis, unspecified           (2) Left otitis media with effusion:        Status: Acute        Code(s):  H65.92 - Unspecified nonsuppurative otitis media, left ear             Plan  The patient was placed on a three-month trial of nasal steroid spray and twice daily nasal saline irrigation.  He was counseled that he has a 60 or 70% chance this will resolve his issues.  He should follow up at the conclusion if he is not satisfied with symptom improvement.        Medications:  New  fluticasone propionate 50 mcg/actuation     administer into each nostril 2 sprays intranasal DAILY 47.4 grams 3RF      cefdinir 300 mg PO BID 20 caps 0RF 10 days                     Levar Gill MD    04/04/23 [id number]    <Electronically signed by Levar Gill MD> 04/05/23 1021

## 2023-05-02 ENCOUNTER — HOSPITAL ENCOUNTER (OUTPATIENT)
Dept: GENERAL RADIOLOGY | Facility: OTHER | Age: 68
Discharge: HOME | End: 2023-05-02
Attending: NURSE PRACTITIONER
Payer: MEDICARE

## 2023-05-02 ENCOUNTER — TELEPHONE (OUTPATIENT)
Dept: TRANSPLANT | Facility: CLINIC | Age: 68
End: 2023-05-02

## 2023-05-02 ENCOUNTER — OFFICE VISIT (OUTPATIENT)
Dept: FAMILY MEDICINE | Facility: OTHER | Age: 68
End: 2023-05-02
Attending: NURSE PRACTITIONER
Payer: MEDICARE

## 2023-05-02 ENCOUNTER — MEDICAL CORRESPONDENCE (OUTPATIENT)
Dept: HEALTH INFORMATION MANAGEMENT | Facility: OTHER | Age: 68
End: 2023-05-02

## 2023-05-02 VITALS
SYSTOLIC BLOOD PRESSURE: 126 MMHG | HEART RATE: 80 BPM | HEIGHT: 67 IN | TEMPERATURE: 101.2 F | DIASTOLIC BLOOD PRESSURE: 72 MMHG | WEIGHT: 170 LBS | RESPIRATION RATE: 20 BRPM | BODY MASS INDEX: 26.68 KG/M2 | OXYGEN SATURATION: 95 %

## 2023-05-02 DIAGNOSIS — J32.0 CHRONIC MAXILLARY SINUSITIS: ICD-10-CM

## 2023-05-02 DIAGNOSIS — Z94.2 S/P LUNG TRANSPLANT (H): Chronic | ICD-10-CM

## 2023-05-02 DIAGNOSIS — R05.1 ACUTE COUGH: Primary | ICD-10-CM

## 2023-05-02 DIAGNOSIS — Z94.2 S/P LUNG TRANSPLANT (H): ICD-10-CM

## 2023-05-02 DIAGNOSIS — R50.9 FEVER, UNSPECIFIED FEVER CAUSE: ICD-10-CM

## 2023-05-02 DIAGNOSIS — R05.1 ACUTE COUGH: ICD-10-CM

## 2023-05-02 LAB
BASOPHILS # BLD AUTO: 0.1 10E3/UL (ref 0–0.2)
BASOPHILS NFR BLD AUTO: 1 %
EOSINOPHIL # BLD AUTO: 0.1 10E3/UL (ref 0–0.7)
EOSINOPHIL NFR BLD AUTO: 2 %
ERYTHROCYTE [DISTWIDTH] IN BLOOD BY AUTOMATED COUNT: 13.8 % (ref 10–15)
HCT VFR BLD AUTO: 35.1 % (ref 40–53)
HGB BLD-MCNC: 11.4 G/DL (ref 13.3–17.7)
IMM GRANULOCYTES # BLD: 0.1 10E3/UL
IMM GRANULOCYTES NFR BLD: 1 %
LYMPHOCYTES # BLD AUTO: 1.5 10E3/UL (ref 0.8–5.3)
LYMPHOCYTES NFR BLD AUTO: 23 %
MCH RBC QN AUTO: 34.2 PG (ref 26.5–33)
MCHC RBC AUTO-ENTMCNC: 32.5 G/DL (ref 31.5–36.5)
MCV RBC AUTO: 105 FL (ref 78–100)
MONOCYTES # BLD AUTO: 0.5 10E3/UL (ref 0–1.3)
MONOCYTES NFR BLD AUTO: 7 %
NEUTROPHILS # BLD AUTO: 4.3 10E3/UL (ref 1.6–8.3)
NEUTROPHILS NFR BLD AUTO: 66 %
NRBC # BLD AUTO: 0 10E3/UL
NRBC BLD AUTO-RTO: 0 /100
PLATELET # BLD AUTO: 145 10E3/UL (ref 150–450)
RBC # BLD AUTO: 3.33 10E6/UL (ref 4.4–5.9)
SARS-COV-2 RNA RESP QL NAA+PROBE: NEGATIVE
WBC # BLD AUTO: 6.5 10E3/UL (ref 4–11)

## 2023-05-02 PROCEDURE — G0463 HOSPITAL OUTPT CLINIC VISIT: HCPCS

## 2023-05-02 PROCEDURE — 85025 COMPLETE CBC W/AUTO DIFF WBC: CPT | Mod: ZL | Performed by: NURSE PRACTITIONER

## 2023-05-02 PROCEDURE — 99214 OFFICE O/P EST MOD 30 MIN: CPT | Mod: CS | Performed by: NURSE PRACTITIONER

## 2023-05-02 PROCEDURE — 36415 COLL VENOUS BLD VENIPUNCTURE: CPT | Mod: ZL | Performed by: NURSE PRACTITIONER

## 2023-05-02 PROCEDURE — 71046 X-RAY EXAM CHEST 2 VIEWS: CPT

## 2023-05-02 PROCEDURE — U0003 INFECTIOUS AGENT DETECTION BY NUCLEIC ACID (DNA OR RNA); SEVERE ACUTE RESPIRATORY SYNDROME CORONAVIRUS 2 (SARS-COV-2) (CORONAVIRUS DISEASE [COVID-19]), AMPLIFIED PROBE TECHNIQUE, MAKING USE OF HIGH THROUGHPUT TECHNOLOGIES AS DESCRIBED BY CMS-2020-01-R: HCPCS | Mod: ZL | Performed by: NURSE PRACTITIONER

## 2023-05-02 PROCEDURE — G0463 HOSPITAL OUTPT CLINIC VISIT: HCPCS | Mod: 25

## 2023-05-02 PROCEDURE — C9803 HOPD COVID-19 SPEC COLLECT: HCPCS | Performed by: NURSE PRACTITIONER

## 2023-05-02 RX ORDER — PREDNISONE 20 MG/1
20 TABLET ORAL DAILY
Qty: 5 TABLET | Refills: 0 | Status: SHIPPED | OUTPATIENT
Start: 2023-05-02 | End: 2023-05-07

## 2023-05-02 NOTE — PROGRESS NOTES
Assessment & Plan   Problem List Items Addressed This Visit        Other    S/P lung transplant (H) (Chronic)    Relevant Medications    amoxicillin-clavulanate (AUGMENTIN) 875-125 MG tablet    Other Relevant Orders    Symptomatic COVID-19 Virus (Coronavirus) by PCR Nose (Completed)    XR Chest 2 Views (Completed)    CBC and Differential (Completed)    Adult ENT  Referral   Other Visit Diagnoses     Acute cough    -  Primary    Relevant Medications    amoxicillin-clavulanate (AUGMENTIN) 875-125 MG tablet    predniSONE (DELTASONE) 20 MG tablet    Other Relevant Orders    Symptomatic COVID-19 Virus (Coronavirus) by PCR Nose (Completed)    XR Chest 2 Views (Completed)    CBC and Differential (Completed)    Fever, unspecified fever cause        Relevant Medications    amoxicillin-clavulanate (AUGMENTIN) 875-125 MG tablet    Other Relevant Orders    Symptomatic COVID-19 Virus (Coronavirus) by PCR Nose (Completed)    XR Chest 2 Views (Completed)    CBC and Differential (Completed)    Chronic maxillary sinusitis        Relevant Medications    amoxicillin-clavulanate (AUGMENTIN) 875-125 MG tablet    predniSONE (DELTASONE) 20 MG tablet    Other Relevant Orders    Adult ENT  Referral         CBC and chest x-ray were updated, these were stable.  We will start her on Augmentin twice daily for 14 days, he has always needed a 14-day course due to immunosuppression.  Prednisone was given 20 mg daily for 5 days, he will hold other prednisone dosing until this is completed.  Referral to ENT per request due to recurrent and chronic sinus symptoms.  COVID testing was also completed which was negative.    Review of the result(s) of each unique test - Chest x-ray, CBC, COVID  Ordering of each unique test  Prescription drug management  24 minutes spent by me on the date of the encounter doing chart review, history and exam, documentation and further activities per the note       No follow-ups on file.    Hoa AVALOS  "BRANDI Olivarez CNP  [facility]    Subjective   Aubrey is a 67 year old, presenting for the following health issues:  Nasal Congestion    HPI   He comes in today for evaluation of upper respiratory concerns.  He has had chronic sinus congestion, recurrent sinus infections.  Over the past 2 days he has had increasing sinus congestion and chest congestion.  Cough has been productive.  Productive drainage from sinuses.  Today fever of 101.2 and has had nausea and vomiting.  He has not taken anything over-the-counter for symptomatic management.  He is on chronic low-dose prednisone, chronic low-dose azithromycin and immunosuppression due to alpha-1 antitrypsin deficiency, COPD and lung transplant.    Review of Systems   See above      Objective    /72   Pulse 80   Temp (!) 101.2  F (38.4  C)   Resp 20   Ht 1.702 m (5' 7\")   Wt 77.1 kg (170 lb)   SpO2 95%   BMI 26.63 kg/m    Body mass index is 26.63 kg/m .  Physical Exam   GENERAL: Ill but nontoxic-appearing male  EYES: Eyes grossly normal to inspection, PERRL and conjunctivae and sclerae normal  HENT: ear canals and TM's normal, nose and mouth without ulcers or lesions.  Bilateral turbinates are swollen and erythematous.  Maxillary sinus tenderness with palpation.  NECK: no adenopathy, no asymmetry, masses, or scars and thyroid normal to palpation  RESP: lungs clear to auscultation - no rales, rhonchi or wheezes.  O2 sats 95% on room air, occasional cough, no respiratory distress  CV: regular rate and rhythm, normal S1 S2, no S3 or S4, no murmur, click or rub, no peripheral edema and peripheral pulses st  NEURO: Normal strength and tone, mentation intact and speech normal  PSYCH: mentation appears normal, affect normal/bright    Results for orders placed or performed during the hospital encounter of 05/02/23   XR Chest 2 Views     Status: None    Narrative    PROCEDURE:  XR CHEST 2 VIEWS    HISTORY: Acute cough; S/P lung transplant (H); " Fever, unspecified  fever cause, .    COMPARISON:  11/17/2022    FINDINGS:  The cardiomediastinal contours are stable. The trachea is midline.  There is calcific aortic atherosclerosis.  No focal consolidation, effusion or pneumothorax. Basilar scarring is  chronic.  No suspicious osseous lesion or subdiaphragmatic free air.      Impression    IMPRESSION:      Unchanged radiographic appearance of the chest.    BECKY NGUYEN MD         SYSTEM ID:  [id number]   Results for orders placed or performed in visit on 05/02/23   CBC with platelets and differential     Status: Abnormal   Result Value Ref Range    WBC Count 6.5 4.0 - 11.0 10e3/uL    RBC Count 3.33 (L) 4.40 - 5.90 10e6/uL    Hemoglobin 11.4 (L) 13.3 - 17.7 g/dL    Hematocrit 35.1 (L) 40.0 - 53.0 %     (H) 78 - 100 fL    MCH 34.2 (H) 26.5 - 33.0 pg    MCHC 32.5 31.5 - 36.5 g/dL    RDW 13.8 10.0 - 15.0 %    Platelet Count 145 (L) 150 - 450 10e3/uL    % Neutrophils 66 %    % Lymphocytes 23 %    % Monocytes 7 %    % Eosinophils 2 %    % Basophils 1 %    % Immature Granulocytes 1 %    NRBCs per 100 WBC 0 <1 /100    Absolute Neutrophils 4.3 1.6 - 8.3 10e3/uL    Absolute Lymphocytes 1.5 0.8 - 5.3 10e3/uL    Absolute Monocytes 0.5 0.0 - 1.3 10e3/uL    Absolute Eosinophils 0.1 0.0 - 0.7 10e3/uL    Absolute Basophils 0.1 0.0 - 0.2 10e3/uL    Absolute Immature Granulocytes 0.1 <=0.4 10e3/uL    Absolute NRBCs 0.0 10e3/uL   Symptomatic COVID-19 Virus (Coronavirus) by PCR Nose     Status: Normal    Specimen: Nose; Swab   Result Value Ref Range    SARS CoV2 PCR Negative Negative    Narrative    Testing was performed using the Xpert Xpress SARS-CoV-2 Assay on the Cepheid Gene-Xpert Instrument Systems. Additional information about this Emergency Use Authorization (EUA) assay can be found via the Lab Guide. This test should be ordered for the detection of SARS-CoV-2 in individuals who meet SARS-CoV-2 clinical and/or epidemiological criteria as well as from individuals  without symptoms or other reasons to suspect COVID-19. Test performance for asymptomatic patients has only been established in anterior nasal swab specimens. This test is for in vitro diagnostic use under the FDA EUA for laboratories certified under CLIA to perform high complexity testing. This test has not been FDA cleared or approved. A negative result does not rule out the presence of PCR inhibitors in the specimen or target RNA concentration below the limit of detection for the assay. The possibility of a false negative should be considered if the patient's recent exposure or clinical presentation suggests COVID-19. This test was validated by [facility]. This laboratory is certified under the Clinical Laboratory Improvement Amendments (CLIA) as qualified to perform high complexity clinical laboratory testing.   CBC and Differential     Status: Abnormal    Narrative    The following orders were created for panel order CBC and Differential.  Procedure                               Abnormality         Status                     ---------                               -----------         ------                     CBC with platelets and d...[[id number]]  Abnormal            Final result                 Please view results for these tests on the individual orders.

## 2023-05-02 NOTE — TELEPHONE ENCOUNTER
"Pt's wife sent [employer] message which stated \"Check blood panel please and this is to let you know Dieudonne has chest discomfort, vomited once and slept for a few hours after seeing Hoa Olivarez. He said he feels like H.... His words not mine. He doesn't have Covid so not sure if it is a flu or chest issue. Nothing showed on his chest x-ray. Kyung\"    Called pt's wife and spoke with pt.  He is feeling better since sleeping this afternoon.  He saw his PCP today, who prescribed Augmentin and a prednisone burst.  Pt threw up this AM, but was able to keep food down this afternoon.  Encouraged pt to notify transplant office if unable to keep medications down or if symptoms don't improve over the next couple of days.  "

## 2023-05-02 NOTE — NURSING NOTE
Patient presents today for nasal congestion and shortness of breath.    Medication Reconciliation Complete    Yohana Barreto LPN  5/2/2023 9:44 AM

## 2023-05-03 ENCOUNTER — TRANSCRIBE ORDERS (OUTPATIENT)
Dept: OTHER | Age: 68
End: 2023-05-03

## 2023-05-03 DIAGNOSIS — Z94.2 S/P LUNG TRANSPLANT (H): Primary | ICD-10-CM

## 2023-05-03 DIAGNOSIS — J32.0 CHRONIC MAXILLARY SINUSITIS: ICD-10-CM
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
## 2023-05-09 ENCOUNTER — VIRTUAL VISIT (OUTPATIENT)
Dept: HEMATOLOGY | Facility: CLINIC | Age: 68
End: 2023-05-09
Attending: STUDENT IN AN ORGANIZED HEALTH CARE EDUCATION/TRAINING PROGRAM
Payer: MEDICARE

## 2023-05-09 DIAGNOSIS — I27.82 CHRONIC PULMONARY EMBOLISM, UNSPECIFIED PULMONARY EMBOLISM TYPE, UNSPECIFIED WHETHER ACUTE COR PULMONALE PRESENT (H): ICD-10-CM

## 2023-05-09 DIAGNOSIS — Z94.2 S/P LUNG TRANSPLANT (H): Chronic | ICD-10-CM

## 2023-05-09 DIAGNOSIS — I74.3 POPLITEAL ARTERY THROMBOSIS, RIGHT (H): Primary | ICD-10-CM

## 2023-05-09 PROCEDURE — G0463 HOSPITAL OUTPT CLINIC VISIT: HCPCS | Mod: PN,GT | Performed by: STUDENT IN AN ORGANIZED HEALTH CARE EDUCATION/TRAINING PROGRAM

## 2023-05-09 PROCEDURE — 99215 OFFICE O/P EST HI 40 MIN: CPT | Mod: VID | Performed by: STUDENT IN AN ORGANIZED HEALTH CARE EDUCATION/TRAINING PROGRAM

## 2023-05-09 RX ORDER — TACROLIMUS 1 MG/1
2 CAPSULE ORAL 2 TIMES DAILY
Qty: 120 CAPSULE | Refills: 11 | Status: SHIPPED | OUTPATIENT
Start: 2023-05-09 | End: 2023-05-26

## 2023-05-16 ENCOUNTER — LAB (OUTPATIENT)
Dept: LAB | Facility: OTHER | Age: 68
End: 2023-05-16
Attending: NURSE PRACTITIONER
Payer: MEDICARE

## 2023-05-16 ENCOUNTER — HOSPITAL ENCOUNTER (OUTPATIENT)
Dept: CT IMAGING | Facility: OTHER | Age: 68
Discharge: HOME | End: 2023-05-16
Attending: STUDENT IN AN ORGANIZED HEALTH CARE EDUCATION/TRAINING PROGRAM
Payer: MEDICARE

## 2023-05-16 ENCOUNTER — HOSPITAL ENCOUNTER (OUTPATIENT)
Dept: ULTRASOUND IMAGING | Facility: OTHER | Age: 68
Discharge: HOME | End: 2023-05-16
Attending: STUDENT IN AN ORGANIZED HEALTH CARE EDUCATION/TRAINING PROGRAM
Payer: MEDICARE

## 2023-05-16 DIAGNOSIS — I27.82 CHRONIC PULMONARY EMBOLISM, UNSPECIFIED PULMONARY EMBOLISM TYPE, UNSPECIFIED WHETHER ACUTE COR PULMONALE PRESENT (H): ICD-10-CM

## 2023-05-16 DIAGNOSIS — Z94.2 S/P LUNG TRANSPLANT (H): ICD-10-CM

## 2023-05-16 DIAGNOSIS — Z79.899 ENCOUNTER FOR LONG-TERM (CURRENT) USE OF HIGH-RISK MEDICATION: ICD-10-CM

## 2023-05-16 DIAGNOSIS — I74.3 POPLITEAL ARTERY THROMBOSIS, RIGHT (H): ICD-10-CM

## 2023-05-16 LAB
ALBUMIN SERPL BCG-MCNC: 3.9 G/DL (ref 3.5–5.2)
ALP SERPL-CCNC: 52 U/L (ref 40–129)
ALT SERPL W P-5'-P-CCNC: 50 U/L (ref 10–50)
ANION GAP SERPL CALCULATED.3IONS-SCNC: 10 MMOL/L (ref 7–15)
AST SERPL W P-5'-P-CCNC: 49 U/L (ref 10–50)
BILIRUB DIRECT SERPL-MCNC: <0.2 MG/DL (ref 0–0.3)
BILIRUB SERPL-MCNC: 0.4 MG/DL
BUN SERPL-MCNC: 41.4 MG/DL (ref 8–23)
CALCIUM SERPL-MCNC: 8.5 MG/DL (ref 8.8–10.2)
CHLORIDE SERPL-SCNC: 111 MMOL/L (ref 98–107)
CREAT SERPL-MCNC: 1.43 MG/DL (ref 0.67–1.17)
DEPRECATED HCO3 PLAS-SCNC: 23 MMOL/L (ref 22–29)
ERYTHROCYTE [DISTWIDTH] IN BLOOD BY AUTOMATED COUNT: 13.6 % (ref 10–15)
GFR SERPL CREATININE-BSD FRML MDRD: 53 ML/MIN/1.73M2
GLUCOSE SERPL-MCNC: 92 MG/DL (ref 70–99)
HCT VFR BLD AUTO: 35 % (ref 40–53)
HGB BLD-MCNC: 11.1 G/DL (ref 13.3–17.7)
MAGNESIUM SERPL-MCNC: 1.8 MG/DL (ref 1.7–2.3)
MCH RBC QN AUTO: 34.3 PG (ref 26.5–33)
MCHC RBC AUTO-ENTMCNC: 31.7 G/DL (ref 31.5–36.5)
MCV RBC AUTO: 108 FL (ref 78–100)
PLATELET # BLD AUTO: 184 10E3/UL (ref 150–450)
POTASSIUM SERPL-SCNC: 4.8 MMOL/L (ref 3.4–5.3)
PROT SERPL-MCNC: 6.2 G/DL (ref 6.4–8.3)
RBC # BLD AUTO: 3.24 10E6/UL (ref 4.4–5.9)
SODIUM SERPL-SCNC: 144 MMOL/L (ref 136–145)
TACROLIMUS BLD-MCNC: 8.9 UG/L (ref 5–15)
TME LAST DOSE: NORMAL H
TME LAST DOSE: NORMAL H
WBC # BLD AUTO: 6.1 10E3/UL (ref 4–11)

## 2023-05-16 PROCEDURE — 93970 EXTREMITY STUDY: CPT

## 2023-05-16 PROCEDURE — 250N000011 HC RX IP 250 OP 636: Performed by: STUDENT IN AN ORGANIZED HEALTH CARE EDUCATION/TRAINING PROGRAM

## 2023-05-16 PROCEDURE — 82306 VITAMIN D 25 HYDROXY: CPT | Mod: ZL

## 2023-05-16 PROCEDURE — 87799 DETECT AGENT NOS DNA QUANT: CPT | Mod: ZL

## 2023-05-16 PROCEDURE — 83735 ASSAY OF MAGNESIUM: CPT | Mod: ZL

## 2023-05-16 PROCEDURE — 80197 ASSAY OF TACROLIMUS: CPT | Mod: ZL

## 2023-05-16 PROCEDURE — 82248 BILIRUBIN DIRECT: CPT | Mod: ZL

## 2023-05-16 PROCEDURE — 36415 COLL VENOUS BLD VENIPUNCTURE: CPT | Mod: ZL

## 2023-05-16 PROCEDURE — 80053 COMPREHEN METABOLIC PANEL: CPT | Mod: ZL

## 2023-05-16 PROCEDURE — 86833 HLA CLASS II HIGH DEFIN QUAL: CPT | Mod: ZL

## 2023-05-16 PROCEDURE — G1010 CDSM STANSON: HCPCS

## 2023-05-16 PROCEDURE — 86832 HLA CLASS I HIGH DEFIN QUAL: CPT | Mod: ZL

## 2023-05-16 PROCEDURE — 85014 HEMATOCRIT: CPT | Mod: ZL

## 2023-05-16 RX ORDER — IOPAMIDOL 755 MG/ML
75 INJECTION, SOLUTION INTRAVASCULAR ONCE
Status: COMPLETED | OUTPATIENT
Start: 2023-05-16 | End: 2023-05-16

## 2023-05-16 RX ADMIN — IOPAMIDOL 75 ML: 755 INJECTION, SOLUTION INTRAVENOUS at 13:52

## 2023-05-17 LAB
CMV DNA SPEC NAA+PROBE-ACNC: NOT DETECTED IU/ML
DEPRECATED CALCIDIOL+CALCIFEROL SERPL-MC: 35 UG/L (ref 20–75)
EBV DNA COPIES/ML, INSTRUMENT: 6643 COPIES/ML
EBV DNA SPEC NAA+PROBE-LOG#: 3.8 {LOG_COPIES}/ML

## 2023-05-18 NOTE — RESULT ENCOUNTER NOTE
Tacrolimus level on 5/16 not accurate 12 hour level.  Plan to repeat labs when pt is in clinic on 5/25.

## 2023-05-19 DIAGNOSIS — I10 ESSENTIAL HYPERTENSION: Primary | Chronic | ICD-10-CM

## 2023-05-22 ENCOUNTER — MYC MEDICAL ADVICE (OUTPATIENT)
Dept: FAMILY MEDICINE | Facility: OTHER | Age: 68
End: 2023-05-22
Payer: MEDICARE

## 2023-05-22 ENCOUNTER — DOCUMENTATION ONLY (OUTPATIENT)
Dept: LAB | Facility: CLINIC | Age: 68
End: 2023-05-22
Payer: MEDICARE

## 2023-05-22 DIAGNOSIS — K86.89 PANCREATIC MASS: Primary | ICD-10-CM

## 2023-05-22 DIAGNOSIS — Z94.2 LUNG REPLACED BY TRANSPLANT (H): Primary | ICD-10-CM

## 2023-05-23 RX ORDER — LISINOPRIL 40 MG/1
TABLET ORAL
Qty: 90 TABLET | Refills: 0 | Status: SHIPPED | OUTPATIENT
Start: 2023-05-23 | End: 2023-09-08

## 2023-05-23 NOTE — TELEPHONE ENCOUNTER
Last Prescription Date: 3/21/2023  Last Qty/Refills: 90 / R-3  Last Office Visit: 5/02/2023  Future Office Visit: None       DOSE CHANGE REQUEST* PATIENT STATES THEY WERE INCREASED TO 40MG DAILY BUT WE HAVE NOT RECEIVED A NEW RX. THANKS!    Patient needs creatinine lab draw.    Cordelia Clifton RN on 5/23/2023 at 2:46 PM       Requested Prescriptions   Pending Prescriptions Disp Refills     lisinopril (ZESTRIL) 40 MG tablet [Pharmacy Med Name: LISINOPRIL 40MG TABLET] 90 tablet 0     Sig: TAKE 1 TABLET (40 MG) BY MOUTH EVERY DAY       ACE Inhibitors (Including Combos) Protocol Failed - 5/19/2023  1:48 PM

## 2023-05-24 DIAGNOSIS — Z94.2 LUNG REPLACED BY TRANSPLANT (H): Primary | ICD-10-CM
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
## 2023-05-25 ENCOUNTER — OFFICE VISIT (OUTPATIENT)
Dept: PULMONOLOGY | Facility: CLINIC | Age: 68
End: 2023-05-25
Attending: INTERNAL MEDICINE
Payer: MEDICARE

## 2023-05-25 ENCOUNTER — ANCILLARY PROCEDURE (OUTPATIENT)
Dept: GENERAL RADIOLOGY | Facility: CLINIC | Age: 68
End: 2023-05-25
Attending: INTERNAL MEDICINE
Payer: MEDICARE

## 2023-05-25 ENCOUNTER — LAB (OUTPATIENT)
Dept: LAB | Facility: CLINIC | Age: 68
End: 2023-05-25
Payer: MEDICARE

## 2023-05-25 ENCOUNTER — OFFICE VISIT (OUTPATIENT)
Dept: ENDOCRINOLOGY | Facility: CLINIC | Age: 68
End: 2023-05-25
Payer: MEDICARE

## 2023-05-25 VITALS
DIASTOLIC BLOOD PRESSURE: 84 MMHG | OXYGEN SATURATION: 99 % | WEIGHT: 171 LBS | SYSTOLIC BLOOD PRESSURE: 168 MMHG | TEMPERATURE: 97.6 F | HEIGHT: 68 IN | BODY MASS INDEX: 25.91 KG/M2 | HEART RATE: 65 BPM

## 2023-05-25 DIAGNOSIS — Z79.899 ENCOUNTER FOR LONG-TERM (CURRENT) USE OF HIGH-RISK MEDICATION: ICD-10-CM

## 2023-05-25 DIAGNOSIS — Z94.2 LUNG REPLACED BY TRANSPLANT (H): ICD-10-CM

## 2023-05-25 DIAGNOSIS — E11.51 DIABETES MELLITUS WITH PERIPHERAL VASCULAR DISEASE (H): ICD-10-CM

## 2023-05-25 DIAGNOSIS — Z79.4 TYPE 2 DIABETES MELLITUS WITH DIABETIC POLYNEUROPATHY, WITH LONG-TERM CURRENT USE OF INSULIN (H): ICD-10-CM

## 2023-05-25 DIAGNOSIS — E11.42 TYPE 2 DIABETES MELLITUS WITH DIABETIC POLYNEUROPATHY, WITH LONG-TERM CURRENT USE OF INSULIN (H): ICD-10-CM

## 2023-05-25 DIAGNOSIS — N18.32 CHRONIC KIDNEY DISEASE, STAGE 3B (H): ICD-10-CM

## 2023-05-25 DIAGNOSIS — E11.628 TYPE 2 DIABETES MELLITUS WITH PRESSURE CALLUS (H): ICD-10-CM

## 2023-05-25 DIAGNOSIS — L84 TYPE 2 DIABETES MELLITUS WITH PRESSURE CALLUS (H): ICD-10-CM

## 2023-05-25 DIAGNOSIS — Z94.2 S/P LUNG TRANSPLANT (H): ICD-10-CM

## 2023-05-25 DIAGNOSIS — Z94.2 LUNG TRANSPLANT STATUS, BILATERAL (H): ICD-10-CM

## 2023-05-25 DIAGNOSIS — E11.49 TYPE II OR UNSPECIFIED TYPE DIABETES MELLITUS WITH NEUROLOGICAL MANIFESTATIONS, NOT STATED AS UNCONTROLLED(250.60) (H): Primary | ICD-10-CM

## 2023-05-25 DIAGNOSIS — B35.1 OM (ONYCHOMYCOSIS): ICD-10-CM

## 2023-05-25 LAB
ALBUMIN SERPL BCG-MCNC: 4 G/DL (ref 3.5–5.2)
ALP SERPL-CCNC: 49 U/L (ref 40–129)
ALT SERPL W P-5'-P-CCNC: 58 U/L (ref 10–50)
ANION GAP SERPL CALCULATED.3IONS-SCNC: 7 MMOL/L (ref 7–15)
AST SERPL W P-5'-P-CCNC: 39 U/L (ref 10–50)
BILIRUB DIRECT SERPL-MCNC: <0.2 MG/DL (ref 0–0.3)
BILIRUB SERPL-MCNC: 0.6 MG/DL
BUN SERPL-MCNC: 27.7 MG/DL (ref 8–23)
CALCIUM SERPL-MCNC: 8.9 MG/DL (ref 8.8–10.2)
CHLORIDE SERPL-SCNC: 110 MMOL/L (ref 98–107)
CREAT SERPL-MCNC: 0.97 MG/DL (ref 0.67–1.17)
CREAT UR-MCNC: 58.3 MG/DL
DEPRECATED HCO3 PLAS-SCNC: 26 MMOL/L (ref 22–29)
ERYTHROCYTE [DISTWIDTH] IN BLOOD BY AUTOMATED COUNT: 13.9 % (ref 10–15)
EXPTIME-PRE: 7.89 SEC
FEF2575-%PRED-PRE: 116 %
FEF2575-PRE: 2.77 L/SEC
FEF2575-PRED: 2.38 L/SEC
FEFMAX-%PRED-PRE: 121 %
FEFMAX-PRE: 9.74 L/SEC
FEFMAX-PRED: 8.01 L/SEC
FEV1-%PRED-PRE: 96 %
FEV1-PRE: 2.94 L
FEV1FEV6-PRE: 80 %
FEV1FEV6-PRED: 78 %
FEV1FVC-PRE: 79 %
FEV1FVC-PRED: 76 %
FIFMAX-PRE: 7.22 L/SEC
FVC-%PRED-PRE: 93 %
FVC-PRE: 3.74 L
FVC-PRED: 4 L
GFR SERPL CREATININE-BSD FRML MDRD: 85 ML/MIN/1.73M2
GLUCOSE SERPL-MCNC: 97 MG/DL (ref 70–99)
HCT VFR BLD AUTO: 36.3 % (ref 40–53)
HGB BLD-MCNC: 11.5 G/DL (ref 13.3–17.7)
MAGNESIUM SERPL-MCNC: 1.7 MG/DL (ref 1.7–2.3)
MCH RBC QN AUTO: 33.7 PG (ref 26.5–33)
MCHC RBC AUTO-ENTMCNC: 31.7 G/DL (ref 31.5–36.5)
MCV RBC AUTO: 107 FL (ref 78–100)
MICROALBUMIN UR-MCNC: 53.1 MG/L
MICROALBUMIN/CREAT UR: 91.08 MG/G CR (ref 0–17)
PLATELET # BLD AUTO: 170 10E3/UL (ref 150–450)
POTASSIUM SERPL-SCNC: 4.6 MMOL/L (ref 3.4–5.3)
PROT SERPL-MCNC: 6.4 G/DL (ref 6.4–8.3)
RBC # BLD AUTO: 3.41 10E6/UL (ref 4.4–5.9)
SODIUM SERPL-SCNC: 143 MMOL/L (ref 136–145)
TACROLIMUS BLD-MCNC: 6.1 UG/L (ref 5–15)
TME LAST DOSE: NORMAL H
TME LAST DOSE: NORMAL H
WBC # BLD AUTO: 4.9 10E3/UL (ref 4–11)

## 2023-05-25 PROCEDURE — 83735 ASSAY OF MAGNESIUM: CPT | Performed by: PATHOLOGY

## 2023-05-25 PROCEDURE — 71046 X-RAY EXAM CHEST 2 VIEWS: CPT | Performed by: RADIOLOGY

## 2023-05-25 PROCEDURE — 82570 ASSAY OF URINE CREATININE: CPT | Performed by: NURSE PRACTITIONER

## 2023-05-25 PROCEDURE — 11720 DEBRIDE NAIL 1-5: CPT | Mod: XS | Performed by: PODIATRIST

## 2023-05-25 PROCEDURE — 85027 COMPLETE CBC AUTOMATED: CPT | Performed by: PATHOLOGY

## 2023-05-25 PROCEDURE — 99214 OFFICE O/P EST MOD 30 MIN: CPT | Mod: 25 | Performed by: PODIATRIST

## 2023-05-25 PROCEDURE — 99214 OFFICE O/P EST MOD 30 MIN: CPT | Mod: 25 | Performed by: PHYSICIAN ASSISTANT

## 2023-05-25 PROCEDURE — G0463 HOSPITAL OUTPT CLINIC VISIT: HCPCS | Performed by: PHYSICIAN ASSISTANT

## 2023-05-25 PROCEDURE — 11055 PARING/CUTG B9 HYPRKER LES 1: CPT | Mod: Q8 | Performed by: PODIATRIST

## 2023-05-25 PROCEDURE — 87799 DETECT AGENT NOS DNA QUANT: CPT | Performed by: INTERNAL MEDICINE

## 2023-05-25 PROCEDURE — 80197 ASSAY OF TACROLIMUS: CPT | Performed by: INTERNAL MEDICINE

## 2023-05-25 PROCEDURE — 80053 COMPREHEN METABOLIC PANEL: CPT | Performed by: PATHOLOGY

## 2023-05-25 PROCEDURE — 82248 BILIRUBIN DIRECT: CPT | Performed by: PATHOLOGY

## 2023-05-25 PROCEDURE — 94375 RESPIRATORY FLOW VOLUME LOOP: CPT | Performed by: PHYSICIAN ASSISTANT

## 2023-05-25 PROCEDURE — 36415 COLL VENOUS BLD VENIPUNCTURE: CPT | Performed by: PATHOLOGY

## 2023-05-25 ASSESSMENT — PAIN SCALES - GENERAL: PAINLEVEL: NO PAIN (0)

## 2023-05-25 NOTE — NURSING NOTE
Chief Complaint   Patient presents with     Lung Transplant     3 month follow up      Vitals were taken and medications were reconciled.     Day Chowdary RMA  9:53 AM

## 2023-05-25 NOTE — NURSING NOTE
Transplant Coordinator Note    Reason for visit: Post lung transplant follow up visit   Coordinator: Present (Luz in person)  Caregiver:  Pt's wife    Health concerns addressed today:  1. Respiratory: sinus issues and bloody noses continue; post nasal drainage improved; no chest congestion  2. GI/: no concerns  3. Elevated BPs at home - managed by PCP    Activity/rehab: Up ad aviva   Oxygen needs: Room air  Pain management/RX: PRN tylenol for foot pain  Diabetic management: Managed by PCP  AC/asa: aspirin 81 mg  PJP prophylactic: bactrim    COVID:  1. COVID-19 infection (yes/no, date of most recent positive test):   2. Status/instructions given about COVID-19 vaccine:     Pt Education: medications (use/dose/side effects), how/when to call coordinator, frequency of labs, s/s of infection/rejection, call prior to starting any new medications, lab/vital sign book    Health Maintenance:     Last colonoscopy:     Next colonoscopy due:     Dermatology:    Vaccinations this visit:     Labs, CXR, PFTs reviewed with patient  Medication record reviewed and reconciled  Questions and concerns addressed    Patient Instructions  1. Continue to hydrate with 60-70 oz fluids daily.  2. Exercise daily or most days of the week.  Try to be as active as possible.  3. Follow up with your primary care provider for annual gender health maintenance procedures.  4. Follow up with colonoscopy schedule.  5. Follow up with annual dermatology visits.  6. Take a multivitamin and a calcium-vitamin D supplement.  7. Get the second Covid bivalent booster.  8. Okay to get Prevnar 20.  9. Get a colonoscopy.    Next transplant clinic appointment:  3 months with CXR, labs and PFTs (video visit)  Next lab draw: pending tacrolimus level, otherwise every 2 months    AVS printed at time of check out
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
## 2023-05-25 NOTE — PATIENT INSTRUCTIONS
Patient Instructions  1. Continue to hydrate with 60-70 oz fluids daily.  2. Exercise daily or most days of the week.  Try to be as active as possible.  3. Follow up with your primary care provider for annual gender health maintenance procedures.  4. Follow up with colonoscopy schedule.  5. Follow up with annual dermatology visits.  6. Take a multivitamin and a calcium-vitamin D supplement.  7. Get the second Covid bivalent booster.  8. Okay to get Prevnar 20.  9. Get a colonoscopy.     Next transplant clinic appointment:  3 months with CXR, labs and PFTs (video visit)  Next lab draw: pending tacrolimus level, otherwise every 2 months    ~~~~~~~~~~~~~~~~~~~~~~~~~    Thoracic Transplant Office phone [phone], fax [phone]    Office Hours 8:30 - 5:00     For after-hours urgent issues, please dial [phone] and ask the  to page the Thoracic Transplant Coordinator On-Call.   --------------------  To expedite your medication refill(s), please contact your pharmacy and have them fax a refill request to: [phone]    *Please allow 3 business days for routine medication refills.  *Please allow 5 business days for controlled substance medication refills.    **For Diabetic medications and supplies refill(s), please contact your pharmacy and have them contact your Endocrine team.  --------------------  For scheduling appointments call [phone].  --------------------  Please Note: If you are active on "[employer] all future test results will be sent by "[employer] message only, and will no longer be called to patient. You may also receive communication directly from your physician.

## 2023-05-25 NOTE — RESULT ENCOUNTER NOTE
Tacrolimus level 6.1 at 12 hours, on 5/25/23.  Goal 8-10.   Current dose 2 mg in AM, 2 mg in PM    Dose changed to 2 mg in AM, 2.5 mg in PM   Recheck level in 7-10 days.    [employer] message sent

## 2023-05-26 ENCOUNTER — TELEPHONE (OUTPATIENT)
Dept: TRANSPLANT | Facility: CLINIC | Age: 68
End: 2023-05-26
Payer: MEDICARE

## 2023-05-26 DIAGNOSIS — Z94.2 S/P LUNG TRANSPLANT (H): Chronic | ICD-10-CM

## 2023-05-26 LAB
CMV DNA SPEC NAA+PROBE-ACNC: NOT DETECTED IU/ML
EBV DNA COPIES/ML, INSTRUMENT: 8806 COPIES/ML
EBV DNA SPEC NAA+PROBE-LOG#: 3.9 {LOG_COPIES}/ML

## 2023-05-26 RX ORDER — TACROLIMUS 1 MG/1
2 CAPSULE ORAL 2 TIMES DAILY
Qty: 120 CAPSULE | Refills: 11 | Status: SHIPPED | OUTPATIENT
Start: 2023-05-26 | End: 2023-06-19

## 2023-05-26 RX ORDER — TACROLIMUS 0.5 MG/1
0.5 CAPSULE ORAL EVERY EVENING
Qty: 30 CAPSULE | Refills: 11 | Status: SHIPPED | OUTPATIENT
Start: 2023-05-26 | End: 2023-06-19

## 2023-05-30 ENCOUNTER — MYC MEDICAL ADVICE (OUTPATIENT)
Dept: FAMILY MEDICINE | Facility: OTHER | Age: 68
End: 2023-05-30
Payer: MEDICARE

## 2023-06-02 ENCOUNTER — HEALTH MAINTENANCE LETTER (OUTPATIENT)
Age: 68
End: 2023-06-02

## 2023-06-05 ENCOUNTER — HOSPITAL ENCOUNTER (OUTPATIENT)
Dept: MRI IMAGING | Facility: OTHER | Age: 68
Discharge: HOME | End: 2023-06-05
Attending: NURSE PRACTITIONER | Admitting: NURSE PRACTITIONER
Payer: MEDICARE

## 2023-06-05 DIAGNOSIS — K86.89 PANCREATIC MASS: ICD-10-CM

## 2023-06-05 PROCEDURE — G1010 CDSM STANSON: HCPCS

## 2023-06-05 PROCEDURE — 74183 MRI ABD W/O CNTR FLWD CNTR: CPT | Mod: MG

## 2023-06-05 PROCEDURE — 255N000002 HC RX 255 OP 636: Performed by: NURSE PRACTITIONER

## 2023-06-05 PROCEDURE — A9575 INJ GADOTERATE MEGLUMI 0.1ML: HCPCS | Performed by: NURSE PRACTITIONER

## 2023-06-05 RX ADMIN — GADOTERATE MEGLUMINE 18 ML: 376.9 INJECTION INTRAVENOUS at 16:07

## 2023-06-07 DIAGNOSIS — I10 ESSENTIAL HYPERTENSION: ICD-10-CM

## 2023-06-08 ENCOUNTER — OFFICE VISIT (OUTPATIENT)
Dept: FAMILY MEDICINE | Facility: OTHER | Age: 68
End: 2023-06-08
Attending: NURSE PRACTITIONER
Payer: MEDICARE

## 2023-06-08 VITALS
DIASTOLIC BLOOD PRESSURE: 74 MMHG | WEIGHT: 172.8 LBS | RESPIRATION RATE: 20 BRPM | HEART RATE: 70 BPM | SYSTOLIC BLOOD PRESSURE: 146 MMHG | OXYGEN SATURATION: 96 % | HEIGHT: 68 IN | BODY MASS INDEX: 26.19 KG/M2 | TEMPERATURE: 98 F

## 2023-06-08 DIAGNOSIS — Z12.11 COLON CANCER SCREENING: ICD-10-CM

## 2023-06-08 DIAGNOSIS — I10 ESSENTIAL HYPERTENSION: Chronic | ICD-10-CM

## 2023-06-08 DIAGNOSIS — Z79.4 TYPE 2 DIABETES MELLITUS WITH DIABETIC POLYNEUROPATHY, WITH LONG-TERM CURRENT USE OF INSULIN (H): Primary | ICD-10-CM

## 2023-06-08 DIAGNOSIS — E11.42 TYPE 2 DIABETES MELLITUS WITH DIABETIC POLYNEUROPATHY, WITH LONG-TERM CURRENT USE OF INSULIN (H): Primary | ICD-10-CM

## 2023-06-08 DIAGNOSIS — K86.9 PANCREATIC LESION: ICD-10-CM

## 2023-06-08 LAB — HBA1C MFR BLD: 4.2 % (ref 4–6.2)

## 2023-06-08 PROCEDURE — G0463 HOSPITAL OUTPT CLINIC VISIT: HCPCS | Performed by: NURSE PRACTITIONER

## 2023-06-08 PROCEDURE — 36415 COLL VENOUS BLD VENIPUNCTURE: CPT | Mod: ZL | Performed by: NURSE PRACTITIONER

## 2023-06-08 PROCEDURE — 99213 OFFICE O/P EST LOW 20 MIN: CPT | Performed by: NURSE PRACTITIONER

## 2023-06-08 PROCEDURE — 83036 HEMOGLOBIN GLYCOSYLATED A1C: CPT | Mod: ZL | Performed by: NURSE PRACTITIONER

## 2023-06-08 RX ORDER — CARVEDILOL 6.25 MG/1
6.25 TABLET ORAL 2 TIMES DAILY WITH MEALS
Qty: 120 TABLET | Refills: 0 | Status: SHIPPED | OUTPATIENT
Start: 2023-06-08 | End: 2023-07-20

## 2023-06-08 RX ORDER — AMLODIPINE BESYLATE 10 MG/1
10 TABLET ORAL DAILY
Qty: 90 TABLET | Refills: 3 | Status: SHIPPED | OUTPATIENT
Start: 2023-06-08 | End: 2024-04-18

## 2023-06-08 ASSESSMENT — PAIN SCALES - GENERAL: PAINLEVEL: NO PAIN (0)

## 2023-06-08 NOTE — PROGRESS NOTES
Assessment & Plan   Problem List Items Addressed This Visit        Nervous and Auditory    Type 2 diabetes mellitus with diabetic polyneuropathy, with long-term current use of insulin (H) - Primary    Relevant Orders    Hemoglobin A1c (Completed)       Circulatory    Essential hypertension (Chronic)    Relevant Medications    carvedilol (COREG) 6.25 MG tablet    amLODIPine (NORVASC) 10 MG tablet   Other Visit Diagnoses     Colon cancer screening        Relevant Orders    Colonoscopy Screening  Referral    Pancreatic lesion        Relevant Orders    Adult GI  Referral - Consult Only      He is due for hemoglobin A1c today, this was updated and within normal limits.  Blood pressure continues to be greater than goal.  At this time we will discontinue metoprolol and start carvedilol 6.25 mg twice daily.  He is aware he likely will need to have this increased.  Continue with other blood pressure medications as scheduled.  He will monitor blood pressures at home and let me know via [employer] next week results, will adjust accordingly.  Colonoscopy ordered as he is overdue.  Referral to GI through [facility] in [place] for pancreatic lesion.    Review of the result(s) of each unique test - a1c  Ordering of each unique test  Prescription drug management  26 minutes spent by me on the date of the encounter doing chart review, history and exam, documentation and further activities per the note  No follow-ups on file.    BRANDI Manning [facility]    Albert Burgos is a 67 year old, presenting for the following health issues:  Hypertension    History of Present Illness       Hypertension: He presents for follow up of hypertension.  He does check blood pressure  regularly outside of the clinic. Outside blood pressures have been over 140/90. He does not follow a low salt diet.     He eats 2-3 servings of fruits and vegetables daily.He consumes 0 sweetened beverage(s) daily.  "  He is taking medications regularly.     He comes in today for follow-up on hypertension.  At his last appointment with specialist, blood pressure was elevated, home blood pressure readings have been 150s to 170s in the morning, 130s to 140s in the evening.  He continues to take amlodipine 10 mg daily, furosemide 20 mg daily, lisinopril 40 mg daily and metoprolol 50 mg twice daily.  Kidney functions have been stable.      Review of Systems   See above      Objective    BP (!) 146/74   Pulse 70   Temp 98  F (36.7  C)   Resp 20   Ht 1.727 m (5' 8\")   Wt 78.4 kg (172 lb 12.8 oz)   SpO2 96%   BMI 26.27 kg/m    Body mass index is 26.27 kg/m .  Physical Exam   GENERAL: healthy, alert and no distress  EYES: Eyes grossly normal to inspection  RESP: lungs clear to auscultation - no rales, rhonchi or wheezes  CV: regular rate and rhythm, normal S1 S2  NEURO: Normal strength and tone, mentation intact and speech normal  PSYCH: mentation appears normal, affect normal/bright    Results for orders placed or performed in visit on 06/08/23   Hemoglobin A1c     Status: Normal   Result Value Ref Range    Hemoglobin A1C 4.2 4.0 - 6.2 %                   "

## 2023-06-08 NOTE — NURSING NOTE
Patient presents today for follow up on blood pressure.    Medication Reconciliation Complete    Yohana Barreto LPN  6/8/2023 11:39 AM

## 2023-06-12 DIAGNOSIS — Z12.11 ENCOUNTER FOR SCREENING COLONOSCOPY: Primary | ICD-10-CM

## 2023-06-12 NOTE — TELEPHONE ENCOUNTER
Screening Questions for the Scheduling of Screening Colonoscopies   (If Colonoscopy is diagnostic, Provider should review the chart before scheduling.)  Are you younger than 50 or older than 80?  NO  Do you take aspirin or fish oil?  ASPIRIN (if yes, tell patient to stop 1 week prior to Colonoscopy)  Do you take warfarin (Coumadin), clopidogrel (Plavix), apixaban (Eliquis), dabigatram (Pradaxa), rivaroxaban (Xarelto) or any blood thinner? PLAVIX  Do you use oxygen at home?  NO  Do you have kidney disease? NO  Are you on dialysis? NO  Have you had a stroke or heart attack in the last year? NO  Have you had a stent in your heart or any blood vessel in the last year? NO  Have you had a transplant of any organ? YES, LUNG IN 2013  Have you had a colonoscopy or upper endoscopy (EGD) before? YES         When?  2012  Date of scheduled Colonoscopy. 09/28/2023  Provider [facility]  [facility]  Patient is currently on Plavix and would appreciate nurses letting him know if/when he should stop taking it prior to procedure.  Thank you.

## 2023-06-13 ENCOUNTER — TRANSCRIBE ORDERS (OUTPATIENT)
Dept: OTHER | Age: 68
End: 2023-06-13

## 2023-06-13 DIAGNOSIS — K86.9 PANCREATIC LESION: Primary | ICD-10-CM

## 2023-06-13 RX ORDER — METOPROLOL TARTRATE 50 MG
50 TABLET ORAL 2 TIMES DAILY
Qty: 180 TABLET | Refills: 3 | OUTPATIENT
Start: 2023-06-13

## 2023-06-13 NOTE — TELEPHONE ENCOUNTER
Refill request refused, with note to pharmacy. Medication has been discontinued.     Requested Prescriptions   Pending Prescriptions Disp Refills     metoprolol tartrate (LOPRESSOR) 50 MG tablet 180 tablet 3     Sig: Take 1 tablet (50 mg) by mouth 2 times daily        The original prescription was discontinued on 6/8/2023 by Hoa Olivarez APRN CNP for the following reason: Med Rec(No AVS / No eCancel). Renewing this prescription may not be appropriate    Last Prescription Date:   07/01/22  Last Fill Qty/Refills:         180, R-3    Cristy Chavez, RN on 6/13/2023 at 9:48 AM

## 2023-06-15 RX ORDER — BISACODYL 5 MG
TABLET, DELAYED RELEASE (ENTERIC COATED) ORAL
Qty: 2 TABLET | Refills: 0 | Status: ON HOLD | OUTPATIENT
Start: 2023-06-15 | End: 2023-09-28

## 2023-06-15 RX ORDER — POLYETHYLENE GLYCOL 3350, SODIUM CHLORIDE, SODIUM BICARBONATE, POTASSIUM CHLORIDE 420; 11.2; 5.72; 1.48 G/4L; G/4L; G/4L; G/4L
4000 POWDER, FOR SOLUTION ORAL ONCE
Qty: 4000 ML | Refills: 0 | Status: SHIPPED | OUTPATIENT
Start: 2023-06-15 | End: 2023-06-15

## 2023-06-16 ENCOUNTER — LAB (OUTPATIENT)
Dept: LAB | Facility: OTHER | Age: 68
End: 2023-06-16
Attending: INTERNAL MEDICINE
Payer: MEDICARE

## 2023-06-16 ENCOUNTER — MYC MEDICAL ADVICE (OUTPATIENT)
Dept: FAMILY MEDICINE | Facility: OTHER | Age: 68
End: 2023-06-16

## 2023-06-16 ENCOUNTER — NURSE TRIAGE (OUTPATIENT)
Dept: FAMILY MEDICINE | Facility: OTHER | Age: 68
End: 2023-06-16

## 2023-06-16 DIAGNOSIS — Z94.2 LUNG REPLACED BY TRANSPLANT (H): ICD-10-CM

## 2023-06-16 LAB
ANION GAP SERPL CALCULATED.3IONS-SCNC: 11 MMOL/L (ref 7–15)
BUN SERPL-MCNC: 41 MG/DL (ref 8–23)
CALCIUM SERPL-MCNC: 8.6 MG/DL (ref 8.8–10.2)
CHLORIDE SERPL-SCNC: 106 MMOL/L (ref 98–107)
CREAT SERPL-MCNC: 1.35 MG/DL (ref 0.67–1.17)
DEPRECATED HCO3 PLAS-SCNC: 24 MMOL/L (ref 22–29)
ERYTHROCYTE [DISTWIDTH] IN BLOOD BY AUTOMATED COUNT: 13.4 % (ref 10–15)
GFR SERPL CREATININE-BSD FRML MDRD: 57 ML/MIN/1.73M2
GLUCOSE SERPL-MCNC: 95 MG/DL (ref 70–99)
HCT VFR BLD AUTO: 36.7 % (ref 40–53)
HGB BLD-MCNC: 11.8 G/DL (ref 13.3–17.7)
MAGNESIUM SERPL-MCNC: 2.1 MG/DL (ref 1.7–2.3)
MCH RBC QN AUTO: 34.3 PG (ref 26.5–33)
MCHC RBC AUTO-ENTMCNC: 32.2 G/DL (ref 31.5–36.5)
MCV RBC AUTO: 107 FL (ref 78–100)
PLATELET # BLD AUTO: 187 10E3/UL (ref 150–450)
POTASSIUM SERPL-SCNC: 4.4 MMOL/L (ref 3.4–5.3)
RBC # BLD AUTO: 3.44 10E6/UL (ref 4.4–5.9)
SODIUM SERPL-SCNC: 141 MMOL/L (ref 136–145)
TACROLIMUS BLD-MCNC: 12 UG/L (ref 5–15)
TME LAST DOSE: NORMAL H
TME LAST DOSE: NORMAL H
WBC # BLD AUTO: 5.5 10E3/UL (ref 4–11)

## 2023-06-16 PROCEDURE — 85027 COMPLETE CBC AUTOMATED: CPT | Mod: ZL

## 2023-06-16 PROCEDURE — 83735 ASSAY OF MAGNESIUM: CPT | Mod: ZL

## 2023-06-16 PROCEDURE — 80197 ASSAY OF TACROLIMUS: CPT | Mod: ZL

## 2023-06-16 PROCEDURE — 36415 COLL VENOUS BLD VENIPUNCTURE: CPT | Mod: ZL

## 2023-06-16 PROCEDURE — 82310 ASSAY OF CALCIUM: CPT | Mod: ZL

## 2023-06-16 NOTE — TELEPHONE ENCOUNTER
Reached out to Corinne, RN about giving patient a call to check that symptoms are consistent with new medication and not neurological needing more urgent assessment.    She will call.    Svitlana Potts RN on 6/16/2023 at 2:25 PM

## 2023-06-16 NOTE — TELEPHONE ENCOUNTER
Patient was called and given provider's instructions below. No further questions or concerns.    Corinne R Thayer, RN on 6/16/2023 at 4:28 PM

## 2023-06-16 NOTE — TELEPHONE ENCOUNTER
I would recommend continuing to monitor symptoms and blood pressure.  As long as symptoms are intermittent, he can continue the medication but if these become persistent or if he develops any new or concerning symptoms he should stop the medications and present to the emergency room.  He can be seen in one of my appointments next Monday or Tuesday for follow-up. BRANDI Manning CNP on 6/16/2023 at 4:22 PM

## 2023-06-16 NOTE — TELEPHONE ENCOUNTER
"S-(situation): Per patient [employer] message: \"I've been having tingling and numbness in my tongue and lips since I started taking Carvedilol. It's not all the time  but of and on. And my arms feel tingly also. And I feel jittery. Is this common with this drug...\"    B-(background): Patient was started on carvedilol 6/8/23. States symptoms started soon after this. Denies any other changes. Patient states he has been checking blood pressures which have been in 130-30s    A-(assessment): States tingling in tongues, lips, and comes and goes. States he is \"feeling on edge\" and jittery. Denies any chest tightness, weakness, slurred speech, headaches, shortness of breath, etc. Patient is clear and easy to understanding during phone call. States symptoms not present at this time.    R-(recommendations): Per protocol, see in clinic within three days. Patient is wondering if medication should be discontinued or if he can get a work in/ same day appointment next week appointment    Corinne R Thayer, RN on 6/16/2023 at 2:49 PM    Reason for Disposition    Numbness or tingling on both sides of body and is a new symptom lasting > 24 hours    Additional Information    Negative: Difficult to awaken or acting confused (e.g., disoriented, slurred speech)    Negative: New neurologic deficit that is present NOW, sudden onset of ANY of the following: * Weakness of the face, arm, or leg on one side of the body* Numbness of the face, arm, or leg on one side of the body* Loss of speech or garbled speech    Negative: Sounds like a life-threatening emergency to the triager    Negative: Confusion, disorientation, or hallucinations is main symptom    Negative: Dizziness is main symptom    Negative: Followed a head injury within last 3 days    Negative: Headache (with neurologic deficit)    Negative: Unable to urinate (or only a few drops) and bladder feels very full    Negative: Loss of bladder or bowel control (urine or bowel incontinence; " wetting self, leaking stool) of new-onset    Negative: Back pain with numbness (loss of sensation) in groin or rectal area    Negative: Neurologic deficit that was brief (now gone), ANY of the following: * Weakness of the face, arm, or leg on one side of the body * Numbness of the face, arm, or leg on one side of the body * Loss of speech or garbled speech    Negative: Patient sounds very sick or weak to the triager    Negative: Neurologic deficit of gradual onset (e.g., days to weeks), ANY of the following: * Weakness of the face, arm, or leg on one side of the body* Numbness of the face, arm, or leg on one side of the body* Loss of speech or garbled speech    Negative: [place] palsy suspected (i.e., weakness only one side of the face, developing over hours to days, no other symptoms)    Negative: Tingling (e.g., pins and needles) of the face, arm or leg on one side of the body, that is present now (Exceptions: Chronic or recurrent symptom lasting > 4 weeks; or tingling from known cause, such as: bumped elbow, carpal tunnel syndrome, pinched nerve, frostbite.)    Negative: Neck pain (with neurologic deficit)    Negative: Back pain (with neurologic deficit)    Negative: Patient wants to be seen    Protocols used: NEUROLOGIC DEFICIT-A-[place]

## 2023-06-18 LAB — CMV DNA SPEC NAA+PROBE-ACNC: NOT DETECTED IU/ML

## 2023-06-19 ENCOUNTER — TELEPHONE (OUTPATIENT)
Dept: GASTROENTEROLOGY | Facility: CLINIC | Age: 68
End: 2023-06-19
Payer: MEDICARE

## 2023-06-19 DIAGNOSIS — Z94.2 S/P LUNG TRANSPLANT (H): Chronic | ICD-10-CM

## 2023-06-19 RX ORDER — TACROLIMUS 0.5 MG/1
CAPSULE ORAL
Qty: 30 CAPSULE | Refills: 11 | Status: SHIPPED | OUTPATIENT
Start: 2023-06-19 | End: 2023-07-14

## 2023-06-19 RX ORDER — TACROLIMUS 1 MG/1
2 CAPSULE ORAL 2 TIMES DAILY
Qty: 120 CAPSULE | Refills: 11 | Status: SHIPPED | OUTPATIENT
Start: 2023-06-19 | End: 2023-07-14

## 2023-06-19 NOTE — PROGRESS NOTES
Tacrolimus level 12 at 12.5 hours, on 6/16/23.  Goal 8-10.   Current dose 2 mg in AM, 2.5 mg in PM    Dose changed to 2 mg in AM, 2 mg in PM   Recheck level in 7-10 days.    [employer] message sent

## 2023-06-19 NOTE — TELEPHONE ENCOUNTER
[facility]     Referring provider:  Hoa Olivarez APRN CNP    Referred to: [facility]     Provider Requested:  NA     Referral Received: 06/19/23       Records received: Epic     Images received: PACS    Evaluation for: pancreatic lesion     Clinical History (per RN review):     Patient w/ hx of lung transplant in 2013, incidental finding of pancreatic lesion on CT to work up for pulmonary emboli, followed by MR Abdomen to image pancreatic lesion    MR Abdomen 6/5/23                                                  IMPRESSION:       There is a 3.8 x 3 mm cystic structure in the head and uncinate  process of the pancreas without suspicious imaging features. No solid  or enhancing component and no main pancreatic duct dilation. This may  represent an IPMN, however GI consultation is recommended for further  evaluation and management.     CT PE protocol 5/16/23  IMPRESSION:     No pulmonary emboli to the subsegmental level.     4.1 cm posterior pancreatic head mass. Recommend dedicated MR abdomen  with and without contrast for further assessment.    MD review date: 06/19/23    MD Decision for clinic consultation/Orders:            Referral updates/Patient contacted:

## 2023-06-20 ENCOUNTER — PREP FOR PROCEDURE (OUTPATIENT)
Dept: GASTROENTEROLOGY | Facility: CLINIC | Age: 68
End: 2023-06-20
Payer: MEDICARE

## 2023-06-20 DIAGNOSIS — K86.2 PANCREATIC CYST: Primary | ICD-10-CM

## 2023-06-20 NOTE — TELEPHONE ENCOUNTER
Per Dr. Cortez  Its a 3cm cystic lesion rather than a 3mm lesion. Patient needs an EUS with FNA at either site with deep sedation, no urgency at the moment.     Called to discuss with patient    Please assist in scheduling:     Procedure/Imaging/Clinic: EUS with FNA  Physician: Dr. Cortez  Timin/10  Scope time needed:50 minutes  Anesthesia:MAC  Dx: pancretic cyst  Tier:3  Location: [place]  Header of letter for pt communication:  Endoscopic Ultrasound with biopsy       Called to discuss with patient.           Explained they can expect a call from  for date and time of procedure, will need a , someone to stay with them for 24 hours and should stay in town for 24 hours (within 45 min of Hospital) post procedure    Patient needs to get pre-op physical completed. If outside [facility] system will need physical faxed to number [phone]   If you do not get a preop physical, your procedure could be cancelled, patient voiced understanding*    Preop Plan:will see PCP, Hoa Olivarez      Does patient have any history of gastric bypass/gastric surgery/altered panc/bili anatomy?no    Does patient have [employer] insurance?:no    Med Review    Blood thinner -  Plavix, asked to hold for 5 days and check with PCP  ASA - yes, ok to continue  Diabetic - yes, on insulin  Any meds by injection or mouth for weight loss or diabetes-no    Patient Education r/t procedure:done    A pre-op nurse will call 1-2 days prior to the procedure.    NPO/Prep:   Adults and Children of all ages may consume solids up to 8 hours prior to arrival time - may consume clear liquids up to 1 hour prior to arrival time.    Other specific details/comments:none     Verbalized understanding of all instructions. All questions answered.     Procedure order placed, message routed to OR / Endo

## 2023-06-26 ENCOUNTER — TELEPHONE (OUTPATIENT)
Dept: TRANSPLANT | Facility: CLINIC | Age: 68
End: 2023-06-26
Payer: MEDICARE

## 2023-06-26 ENCOUNTER — TELEPHONE (OUTPATIENT)
Dept: TRANSPLANT | Facility: CLINIC | Age: 68
End: 2023-06-26

## 2023-06-26 DIAGNOSIS — Z94.2 LUNG REPLACED BY TRANSPLANT (H): Primary | ICD-10-CM

## 2023-06-26 DIAGNOSIS — Z79.899 ENCOUNTER FOR LONG-TERM (CURRENT) USE OF HIGH-RISK MEDICATION: ICD-10-CM

## 2023-06-26 NOTE — PROGRESS NOTES
Pt had a thyroid US on 1/3/22, which showed multiple benign-appearing thyroid nodules including 2 larger cystic nodules.  TSH and T3 labs normal.  Pt is overdue for thyroid ultrasound.  Thyroid ultrasound ordered.  [employer] message sent to pt.

## 2023-06-26 NOTE — TELEPHONE ENCOUNTER
[facility] Call Center    Phone Message    May a detailed message be left on voicemail: no     Reason for Call: Orders:    Patient is wanting to get orders completed locally - Labs, CXR, PFT's, Thyroid US, CT Scan prior to ENT.  Please send orders to [facility] for scheduling.  Patient also confirmed he will follow-up with Dermatology - him and his wife were so nice :)    Thank you!    Action Taken: Message routed to:  [facility] ([facility]  [facility]    Travel Screening: Not Applicable

## 2023-06-26 NOTE — TELEPHONE ENCOUNTER
[facility] Call Center    Phone Message    May a detailed message be left on voicemail: yes     Reason for Call: Other: patient was called to schedule [facility] liver follow up, per patient he stated he was told he could be seen virtually. Order states this can be virtual but we do not have any direction on scheduling virtual appoitments for lung patients. please switch patients upcoming visit on 8/24/23 to video.     Action Taken: Message routed to:  Other: [facility]    Travel Screening: Not Applicable

## 2023-06-27 ENCOUNTER — TELEPHONE (OUTPATIENT)
Dept: DERMATOLOGY | Facility: OTHER | Age: 68
End: 2023-06-27

## 2023-06-27 NOTE — TELEPHONE ENCOUNTER
1:07 PM    Reason for Call: Phone Call    Description: Patient wife calling they spoke with Dr Luz they said Dr Luz suggested for Aubrey to see Dr Luz at the [facility] and this patient is not a [facility] patient. Please advise.      Preferred method for responding to this message: Telephone Call  What is your phone number ? [phone]    If we cannot reach you directly, may we leave a detailed response at the number you provided? Yes    Can this message wait until your PCP/provider returns, if available today? YES, No    Radha Cabral

## 2023-06-28 ENCOUNTER — OFFICE VISIT (OUTPATIENT)
Dept: FAMILY MEDICINE | Facility: OTHER | Age: 68
End: 2023-06-28
Attending: NURSE PRACTITIONER
Payer: MEDICARE

## 2023-06-28 VITALS
TEMPERATURE: 97.4 F | BODY MASS INDEX: 26.22 KG/M2 | RESPIRATION RATE: 20 BRPM | SYSTOLIC BLOOD PRESSURE: 148 MMHG | DIASTOLIC BLOOD PRESSURE: 76 MMHG | HEIGHT: 68 IN | OXYGEN SATURATION: 96 % | HEART RATE: 80 BPM | WEIGHT: 173 LBS

## 2023-06-28 DIAGNOSIS — I10 ESSENTIAL HYPERTENSION: Chronic | ICD-10-CM

## 2023-06-28 DIAGNOSIS — N18.32 CHRONIC KIDNEY DISEASE, STAGE 3B (H): ICD-10-CM

## 2023-06-28 DIAGNOSIS — Z94.2 LUNG REPLACED BY TRANSPLANT (H): ICD-10-CM

## 2023-06-28 DIAGNOSIS — J44.9 CHRONIC OBSTRUCTIVE PULMONARY DISEASE, UNSPECIFIED COPD TYPE (H): ICD-10-CM

## 2023-06-28 DIAGNOSIS — Z79.4 TYPE 2 DIABETES MELLITUS WITH DIABETIC POLYNEUROPATHY, WITH LONG-TERM CURRENT USE OF INSULIN (H): ICD-10-CM

## 2023-06-28 DIAGNOSIS — Z94.2 S/P LUNG TRANSPLANT (H): Chronic | ICD-10-CM

## 2023-06-28 DIAGNOSIS — Z01.818 PREOP GENERAL PHYSICAL EXAM: Primary | ICD-10-CM

## 2023-06-28 DIAGNOSIS — Z79.899 ENCOUNTER FOR LONG-TERM (CURRENT) USE OF HIGH-RISK MEDICATION: ICD-10-CM

## 2023-06-28 DIAGNOSIS — E11.42 TYPE 2 DIABETES MELLITUS WITH DIABETIC POLYNEUROPATHY, WITH LONG-TERM CURRENT USE OF INSULIN (H): ICD-10-CM

## 2023-06-28 LAB
ANION GAP SERPL CALCULATED.3IONS-SCNC: 9 MMOL/L (ref 7–15)
BUN SERPL-MCNC: 26.5 MG/DL (ref 8–23)
CALCIUM SERPL-MCNC: 8.8 MG/DL (ref 8.8–10.2)
CHLORIDE SERPL-SCNC: 107 MMOL/L (ref 98–107)
CREAT SERPL-MCNC: 1.01 MG/DL (ref 0.67–1.17)
DEPRECATED HCO3 PLAS-SCNC: 25 MMOL/L (ref 22–29)
ERYTHROCYTE [DISTWIDTH] IN BLOOD BY AUTOMATED COUNT: 13.5 % (ref 10–15)
GFR SERPL CREATININE-BSD FRML MDRD: 81 ML/MIN/1.73M2
GLUCOSE SERPL-MCNC: 91 MG/DL (ref 70–99)
HCT VFR BLD AUTO: 36.7 % (ref 40–53)
HGB BLD-MCNC: 11.9 G/DL (ref 13.3–17.7)
HOLD SPECIMEN: NORMAL
MAGNESIUM SERPL-MCNC: 1.8 MG/DL (ref 1.7–2.3)
MCH RBC QN AUTO: 34.8 PG (ref 26.5–33)
MCHC RBC AUTO-ENTMCNC: 32.4 G/DL (ref 31.5–36.5)
MCV RBC AUTO: 107 FL (ref 78–100)
PLATELET # BLD AUTO: 168 10E3/UL (ref 150–450)
POTASSIUM SERPL-SCNC: 4.3 MMOL/L (ref 3.4–5.3)
RBC # BLD AUTO: 3.42 10E6/UL (ref 4.4–5.9)
SODIUM SERPL-SCNC: 141 MMOL/L (ref 136–145)
TSH SERPL DL<=0.005 MIU/L-ACNC: 0.86 UIU/ML (ref 0.3–4.2)
WBC # BLD AUTO: 6.8 10E3/UL (ref 4–11)

## 2023-06-28 PROCEDURE — 80197 ASSAY OF TACROLIMUS: CPT | Mod: ZL | Performed by: NURSE PRACTITIONER

## 2023-06-28 PROCEDURE — G0463 HOSPITAL OUTPT CLINIC VISIT: HCPCS

## 2023-06-28 PROCEDURE — 80048 BASIC METABOLIC PNL TOTAL CA: CPT | Mod: ZL | Performed by: NURSE PRACTITIONER

## 2023-06-28 PROCEDURE — 83735 ASSAY OF MAGNESIUM: CPT | Mod: ZL | Performed by: NURSE PRACTITIONER

## 2023-06-28 PROCEDURE — 93005 ELECTROCARDIOGRAM TRACING: CPT | Performed by: NURSE PRACTITIONER

## 2023-06-28 PROCEDURE — 99214 OFFICE O/P EST MOD 30 MIN: CPT | Performed by: NURSE PRACTITIONER

## 2023-06-28 PROCEDURE — 85027 COMPLETE CBC AUTOMATED: CPT | Mod: ZL | Performed by: NURSE PRACTITIONER

## 2023-06-28 PROCEDURE — 36415 COLL VENOUS BLD VENIPUNCTURE: CPT | Mod: ZL | Performed by: NURSE PRACTITIONER

## 2023-06-28 PROCEDURE — 93010 ELECTROCARDIOGRAM REPORT: CPT | Performed by: INTERNAL MEDICINE

## 2023-06-28 PROCEDURE — 84443 ASSAY THYROID STIM HORMONE: CPT | Mod: ZL | Performed by: NURSE PRACTITIONER

## 2023-06-28 NOTE — PATIENT INSTRUCTIONS
For informational purposes only. Not to replace the advice of your health care provider. Copyright   2003,  [employer] [facility]. All rights reserved. Clinically reviewed by Alexandra Morris MD. [employer] [id number] - REV .  Preparing for Your Surgery  Getting started  A nurse will call you to review your health history and instructions. They will give you an arrival time based on your scheduled surgery time. Please be ready to share:  Your doctor's clinic name and phone number  Your medical, surgical, and anesthesia history  A list of allergies and sensitivities  A list of medicines, including herbal treatments and over-the-counter drugs  Whether the patient has a legal guardian (ask how to send us the papers in advance)  Please tell us if you're pregnant--or if there's any chance you might be pregnant. Some surgeries may injure a fetus (unborn baby), so they require a pregnancy test. Surgeries that are safe for a fetus don't always need a test, and you can choose whether to have one.   If you have a child who's having surgery, please ask for a copy of Preparing for Your Child's Surgery.    Preparing for surgery  Within 10 to 30 days of surgery: Have a pre-op exam (sometimes called an H&P, or History and Physical). This can be done at a clinic or pre-operative center.  If you're having a , you may not need this exam. Talk to your care team.  At your pre-op exam, talk to your care team about all medicines you take. If you need to stop any medicines before surgery, ask when to start taking them again.  We do this for your safety. Many medicines can make you bleed too much during surgery. Some change how well surgery (anesthesia) drugs work.  Call your insurance company to let them know you're having surgery. (If you don't have insurance, call [phone].)  Call your clinic if there's any change in your health. This includes signs of a cold or flu (sore throat, runny nose, cough, rash, fever). It  also includes a scrape or scratch near the surgery site.  If you have questions on the day of surgery, call your hospital or surgery center.  Eating and drinking guidelines  For your safety: Unless your surgeon tells you otherwise, follow the guidelines below.  Eat and drink as usual until 8 hours before you arrive for surgery. After that, no food or milk.  Drink clear liquids until 2 hours before you arrive. These are liquids you can see through, like water, Gatorade, and Propel Water. They also include plain black coffee and tea (no cream or milk), candy, and breath mints. You can spit out gum when you arrive.  If you drink alcohol: Stop drinking it the night before surgery.  If your care team tells you to take medicine on the morning of surgery, it's okay to take it with a sip of water.  Preventing infection  Shower or bathe the night before and morning of your surgery. Follow the instructions your clinic gave you. (If no instructions, use regular soap.)  Don't shave or clip hair near your surgery site. We'll remove the hair if needed.  Don't smoke or vape the morning of surgery. You may chew nicotine gum up to 2 hours before surgery. A nicotine patch is okay.  Note: Some surgeries require you to completely quit smoking and nicotine. Check with your surgeon.  Your care team will make every effort to keep you safe from infection. We will:  Clean our hands often with soap and water (or an alcohol-based hand rub).  Clean the skin at your surgery site with a special soap that kills germs.  Give you a special gown to keep you warm. (Cold raises the risk of infection.)  Wear special hair covers, masks, gowns and gloves during surgery.  Give antibiotic medicine, if prescribed. Not all surgeries need antibiotics.  What to bring on the day of surgery  Photo ID and insurance card  Copy of your health care directive, if you have one  Glasses and hearing aids (bring cases)  You can't wear contacts during surgery  Inhaler and  eye drops, if you use them (tell us about these when you arrive)  CPAP machine or breathing device, if you use them  A few personal items, if spending the night  If you have . . .  A pacemaker, ICD (cardiac defibrillator) or other implant: Bring the ID card.  An implanted stimulator: Bring the remote control.  A legal guardian: Bring a copy of the certified (court-stamped) guardianship papers.  Please remove any jewelry, including body piercings. Leave jewelry and other valuables at home.  If you're going home the day of surgery  You must have a responsible adult drive you home. They should stay with you overnight as well.  If you don't have someone to stay with you, and you aren't safe to go home alone, we may keep you overnight. Insurance often won't pay for this.  After surgery  If it's hard to control your pain or you need more pain medicine, please call your surgeon's office.  Questions?   If you have any questions for your care team, list them here: _________________________________________________________________________________________________________________________________________________________________________ ____________________________________ ____________________________________ ____________________________________    How to Take Your Medication Before Surgery  Hold plavix and aspirin x5 days before surgery  No lisinopril am or surgery  No lasix morning of surgery  Hold lantus and novolog morning of surgery
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
## 2023-06-28 NOTE — NURSING NOTE
Patient presents today for pre op exam.    Medication Reconciliation Complete    Yohana Barreto LPN  6/28/2023 9:04 AM
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
## 2023-06-29 LAB
CMV DNA SPEC NAA+PROBE-ACNC: NOT DETECTED IU/ML
TACROLIMUS BLD-MCNC: 7.4 UG/L (ref 5–15)
TME LAST DOSE: NORMAL H
TME LAST DOSE: NORMAL H

## 2023-06-29 NOTE — TELEPHONE ENCOUNTER
SAMMI Luz MD  You 4 hours ago (7:30 AM)     HH  I assume this is an insurance issue. I would personally be happy to see Aubrey in [place] but do not know how to address the insurance problem. I will contact my nurse assistant and see if she can help get Aubrey in to see me when I am next there.

## 2023-06-29 NOTE — RESULT ENCOUNTER NOTE
Tacrolimus level 7.4 at 12.5 hours, on 6/28/23.  Goal 8-10.   Current dose 2 mg in AM, 2 mg in PM    Level just below goal.  Plan to repeat labs in 1-2 weeks.  No dose change.    userADgents message sent

## 2023-06-30 ENCOUNTER — MYC MEDICAL ADVICE (OUTPATIENT)
Dept: FAMILY MEDICINE | Facility: OTHER | Age: 68
End: 2023-06-30
Payer: MEDICARE

## 2023-07-01 LAB
ATRIAL RATE - MUSE: 74 BPM
DIASTOLIC BLOOD PRESSURE - MUSE: NORMAL MMHG
INTERPRETATION ECG - MUSE: NORMAL
P AXIS - MUSE: 24 DEGREES
PR INTERVAL - MUSE: 168 MS
QRS DURATION - MUSE: 90 MS
QT - MUSE: 408 MS
QTC - MUSE: 452 MS
R AXIS - MUSE: -18 DEGREES
SYSTOLIC BLOOD PRESSURE - MUSE: NORMAL MMHG
T AXIS - MUSE: 90 DEGREES
VENTRICULAR RATE- MUSE: 74 BPM

## 2023-07-10 ENCOUNTER — ANESTHESIA EVENT (OUTPATIENT)
Dept: SURGERY | Facility: CLINIC | Age: 68
End: 2023-07-10
Payer: MEDICARE

## 2023-07-10 ENCOUNTER — HOSPITAL ENCOUNTER (OUTPATIENT)
Facility: CLINIC | Age: 68
Discharge: HOME | End: 2023-07-10
Attending: INTERNAL MEDICINE | Admitting: INTERNAL MEDICINE
Payer: MEDICARE

## 2023-07-10 ENCOUNTER — ANESTHESIA (OUTPATIENT)
Dept: SURGERY | Facility: CLINIC | Age: 68
End: 2023-07-10
Payer: MEDICARE

## 2023-07-10 VITALS
TEMPERATURE: 97 F | DIASTOLIC BLOOD PRESSURE: 83 MMHG | WEIGHT: 168.8 LBS | BODY MASS INDEX: 25.58 KG/M2 | HEART RATE: 72 BPM | SYSTOLIC BLOOD PRESSURE: 137 MMHG | OXYGEN SATURATION: 97 % | RESPIRATION RATE: 11 BRPM | HEIGHT: 68 IN

## 2023-07-10 DIAGNOSIS — K86.2 PANCREATIC CYST: Primary | ICD-10-CM

## 2023-07-10 LAB
AMYLASE FLD-CCNC: >7500 U/L
CEA FLD-MCNC: 94.5 NG/ML
GLUCOSE BLDC GLUCOMTR-MCNC: 145 MG/DL (ref 70–99)
GLUCOSE BLDC GLUCOMTR-MCNC: 98 MG/DL (ref 70–99)
UPPER EUS: NORMAL

## 2023-07-10 PROCEDURE — 272N000001 HC OR GENERAL SUPPLY STERILE: Performed by: INTERNAL MEDICINE

## 2023-07-10 PROCEDURE — 710N000009 HC RECOVERY PHASE 1, LEVEL 1, PER MIN: Performed by: INTERNAL MEDICINE

## 2023-07-10 PROCEDURE — 82378 CARCINOEMBRYONIC ANTIGEN: CPT | Performed by: INTERNAL MEDICINE

## 2023-07-10 PROCEDURE — 710N000012 HC RECOVERY PHASE 2, PER MINUTE: Performed by: INTERNAL MEDICINE

## 2023-07-10 PROCEDURE — 88313 SPECIAL STAINS GROUP 2: CPT | Mod: 26 | Performed by: PATHOLOGY

## 2023-07-10 PROCEDURE — 250N000009 HC RX 250: Performed by: NURSE ANESTHETIST, CERTIFIED REGISTERED

## 2023-07-10 PROCEDURE — 82962 GLUCOSE BLOOD TEST: CPT

## 2023-07-10 PROCEDURE — 88108 CYTOPATH CONCENTRATE TECH: CPT | Mod: 26 | Performed by: PATHOLOGY

## 2023-07-10 PROCEDURE — 360N000075 HC SURGERY LEVEL 2, PER MIN: Performed by: INTERNAL MEDICINE

## 2023-07-10 PROCEDURE — 999N000141 HC STATISTIC PRE-PROCEDURE NURSING ASSESSMENT: Performed by: INTERNAL MEDICINE

## 2023-07-10 PROCEDURE — 88313 SPECIAL STAINS GROUP 2: CPT | Mod: TC | Performed by: INTERNAL MEDICINE

## 2023-07-10 PROCEDURE — 258N000003 HC RX IP 258 OP 636: Performed by: ANESTHESIOLOGY

## 2023-07-10 PROCEDURE — 370N000017 HC ANESTHESIA TECHNICAL FEE, PER MIN: Performed by: INTERNAL MEDICINE

## 2023-07-10 PROCEDURE — 258N000003 HC RX IP 258 OP 636: Performed by: NURSE ANESTHETIST, CERTIFIED REGISTERED

## 2023-07-10 PROCEDURE — 250N000011 HC RX IP 250 OP 636: Performed by: NURSE ANESTHETIST, CERTIFIED REGISTERED

## 2023-07-10 RX ORDER — LIDOCAINE HYDROCHLORIDE 20 MG/ML
INJECTION, SOLUTION INFILTRATION; PERINEURAL PRN
Status: DISCONTINUED | OUTPATIENT
Start: 2023-07-10 | End: 2023-07-10

## 2023-07-10 RX ORDER — AMOXICILLIN AND CLAVULANATE POTASSIUM 400; 57 MG/5ML; MG/5ML
400 POWDER, FOR SUSPENSION ORAL 2 TIMES DAILY
Qty: 6 ML | Refills: 0 | Status: SHIPPED | OUTPATIENT
Start: 2023-07-10 | End: 2023-07-18

## 2023-07-10 RX ORDER — HYDROMORPHONE HCL IN WATER/PF 6 MG/30 ML
0.4 PATIENT CONTROLLED ANALGESIA SYRINGE INTRAVENOUS EVERY 5 MIN PRN
Status: DISCONTINUED | OUTPATIENT
Start: 2023-07-10 | End: 2023-07-10 | Stop reason: HOSPADM

## 2023-07-10 RX ORDER — PROPOFOL 10 MG/ML
INJECTION, EMULSION INTRAVENOUS PRN
Status: DISCONTINUED | OUTPATIENT
Start: 2023-07-10 | End: 2023-07-10

## 2023-07-10 RX ORDER — DIMENHYDRINATE 50 MG/ML
25 INJECTION, SOLUTION INTRAMUSCULAR; INTRAVENOUS
Status: DISCONTINUED | OUTPATIENT
Start: 2023-07-10 | End: 2023-07-10 | Stop reason: HOSPADM

## 2023-07-10 RX ORDER — PROPOFOL 10 MG/ML
INJECTION, EMULSION INTRAVENOUS CONTINUOUS PRN
Status: DISCONTINUED | OUTPATIENT
Start: 2023-07-10 | End: 2023-07-10

## 2023-07-10 RX ORDER — LIDOCAINE 40 MG/G
CREAM TOPICAL
Status: DISCONTINUED | OUTPATIENT
Start: 2023-07-10 | End: 2023-07-10 | Stop reason: HOSPADM

## 2023-07-10 RX ORDER — ONDANSETRON 2 MG/ML
INJECTION INTRAMUSCULAR; INTRAVENOUS PRN
Status: DISCONTINUED | OUTPATIENT
Start: 2023-07-10 | End: 2023-07-10

## 2023-07-10 RX ORDER — LABETALOL HYDROCHLORIDE 5 MG/ML
10 INJECTION, SOLUTION INTRAVENOUS
Status: DISCONTINUED | OUTPATIENT
Start: 2023-07-10 | End: 2023-07-10 | Stop reason: HOSPADM

## 2023-07-10 RX ORDER — ACETAMINOPHEN 325 MG/1
975 TABLET ORAL ONCE
Status: DISCONTINUED | OUTPATIENT
Start: 2023-07-10 | End: 2023-07-10 | Stop reason: HOSPADM

## 2023-07-10 RX ORDER — FENTANYL CITRATE 0.05 MG/ML
50 INJECTION, SOLUTION INTRAMUSCULAR; INTRAVENOUS EVERY 5 MIN PRN
Status: DISCONTINUED | OUTPATIENT
Start: 2023-07-10 | End: 2023-07-10 | Stop reason: HOSPADM

## 2023-07-10 RX ORDER — ONDANSETRON 2 MG/ML
4 INJECTION INTRAMUSCULAR; INTRAVENOUS EVERY 30 MIN PRN
Status: DISCONTINUED | OUTPATIENT
Start: 2023-07-10 | End: 2023-07-10 | Stop reason: HOSPADM

## 2023-07-10 RX ORDER — DEXAMETHASONE SODIUM PHOSPHATE 4 MG/ML
INJECTION, SOLUTION INTRA-ARTICULAR; INTRALESIONAL; INTRAMUSCULAR; INTRAVENOUS; SOFT TISSUE PRN
Status: DISCONTINUED | OUTPATIENT
Start: 2023-07-10 | End: 2023-07-10

## 2023-07-10 RX ORDER — SODIUM CHLORIDE, SODIUM LACTATE, POTASSIUM CHLORIDE, CALCIUM CHLORIDE 600; 310; 30; 20 MG/100ML; MG/100ML; MG/100ML; MG/100ML
INJECTION, SOLUTION INTRAVENOUS CONTINUOUS
Status: DISCONTINUED | OUTPATIENT
Start: 2023-07-10 | End: 2023-07-10 | Stop reason: HOSPADM

## 2023-07-10 RX ORDER — CIPROFLOXACIN 2 MG/ML
INJECTION, SOLUTION INTRAVENOUS PRN
Status: DISCONTINUED | OUTPATIENT
Start: 2023-07-10 | End: 2023-07-10

## 2023-07-10 RX ORDER — FENTANYL CITRATE 0.05 MG/ML
25 INJECTION, SOLUTION INTRAMUSCULAR; INTRAVENOUS EVERY 5 MIN PRN
Status: DISCONTINUED | OUTPATIENT
Start: 2023-07-10 | End: 2023-07-10 | Stop reason: HOSPADM

## 2023-07-10 RX ORDER — HYDRALAZINE HYDROCHLORIDE 20 MG/ML
2.5-5 INJECTION INTRAMUSCULAR; INTRAVENOUS EVERY 10 MIN PRN
Status: DISCONTINUED | OUTPATIENT
Start: 2023-07-10 | End: 2023-07-10 | Stop reason: HOSPADM

## 2023-07-10 RX ORDER — ONDANSETRON 4 MG/1
4 TABLET, ORALLY DISINTEGRATING ORAL EVERY 30 MIN PRN
Status: DISCONTINUED | OUTPATIENT
Start: 2023-07-10 | End: 2023-07-10 | Stop reason: HOSPADM

## 2023-07-10 RX ORDER — SODIUM CHLORIDE, SODIUM LACTATE, POTASSIUM CHLORIDE, CALCIUM CHLORIDE 600; 310; 30; 20 MG/100ML; MG/100ML; MG/100ML; MG/100ML
INJECTION, SOLUTION INTRAVENOUS CONTINUOUS PRN
Status: DISCONTINUED | OUTPATIENT
Start: 2023-07-10 | End: 2023-07-10

## 2023-07-10 RX ORDER — HYDROMORPHONE HCL IN WATER/PF 6 MG/30 ML
0.2 PATIENT CONTROLLED ANALGESIA SYRINGE INTRAVENOUS EVERY 5 MIN PRN
Status: DISCONTINUED | OUTPATIENT
Start: 2023-07-10 | End: 2023-07-10 | Stop reason: HOSPADM

## 2023-07-10 RX ADMIN — SODIUM CHLORIDE, POTASSIUM CHLORIDE, SODIUM LACTATE AND CALCIUM CHLORIDE: 600; 310; 30; 20 INJECTION, SOLUTION INTRAVENOUS at 11:21

## 2023-07-10 RX ADMIN — SODIUM CHLORIDE, POTASSIUM CHLORIDE, SODIUM LACTATE AND CALCIUM CHLORIDE: 600; 310; 30; 20 INJECTION, SOLUTION INTRAVENOUS at 11:27

## 2023-07-10 RX ADMIN — PROPOFOL 30 MG: 10 INJECTION, EMULSION INTRAVENOUS at 11:36

## 2023-07-10 RX ADMIN — LIDOCAINE HYDROCHLORIDE 100 MG: 20 INJECTION, SOLUTION INFILTRATION; PERINEURAL at 11:30

## 2023-07-10 RX ADMIN — PROPOFOL 30 MG: 10 INJECTION, EMULSION INTRAVENOUS at 11:30

## 2023-07-10 RX ADMIN — CIPROFLOXACIN 400 MG: 2 INJECTION INTRAVENOUS at 11:39

## 2023-07-10 RX ADMIN — PROPOFOL 30 MG: 10 INJECTION, EMULSION INTRAVENOUS at 11:33

## 2023-07-10 RX ADMIN — ONDANSETRON 4 MG: 2 INJECTION INTRAMUSCULAR; INTRAVENOUS at 11:36

## 2023-07-10 RX ADMIN — PROPOFOL 175 MCG/KG/MIN: 10 INJECTION, EMULSION INTRAVENOUS at 11:30

## 2023-07-10 ASSESSMENT — COPD QUESTIONNAIRES: COPD: 1

## 2023-07-10 ASSESSMENT — LIFESTYLE VARIABLES: TOBACCO_USE: 1

## 2023-07-10 ASSESSMENT — ACTIVITIES OF DAILY LIVING (ADL)
ADLS_ACUITY_SCORE: 35
ADLS_ACUITY_SCORE: 35

## 2023-07-10 NOTE — OP NOTE
Upper EUS 07/10/2023 11:07 AM [facility] [address] [place]  [address]   _______________________________________________________________________________   Patient Name: Aubrey Duncan             Procedure Date: 7/10/2023 11:07 AM   MRN: [id number]                       Account Number: [id number]   YOB: 1955              Admit Type: Outpatient   Age: 68                               Room: [place] [address]   Note Status: Finalized                Attending MD: JESSICA HENNING MD,   Instrument Name: [id number] Linear   _______________________________________________________________________________       Procedure:                Upper EUS   Indications:              Pancreatic cyst on MRI   Providers:                JESSICA HENNING MD, Anusha Silverio, RN, Radha Castaneda RN   Patient Profile:          Mr Duncan is a 67yo gentleman incidentally found to                             have a >3cm pancreatic head cyst who now proceeds                             to EUS for further evaluation and sampling.   Referring MD:             MEG MELLO   Medicines:                Cipro 400 mg IV, Deep sedation was administered   Complications:            No immediate complications.   _______________________________________________________________________________   Procedure:                Pre-Anesthesia Assessment:                             - Prior to the procedure, a History and Physical                             was performed, and patient medications and                             allergies were reviewed. The patient is competent.                             The risks and benefits of the procedure and the                             sedation options and risks were discussed with the                             patient. All questions were answered and informed                             consent was obtained. Patient identification and                              proposed procedure were verified by the nurse in                             the pre-procedure area. Mental Status Examination:                             alert and oriented. Airway Examination: Mallampati                             Class II (the uvula but not tonsillar pillars                             visualized). Respiratory Examination: clear to                             auscultation. CV Examination: normal. ASA Grade                             Assessment: III - A patient with severe systemic                             disease. After reviewing the risks and benefits,                             the patient was deemed in satisfactory condition to                             undergo the procedure. The anesthesia plan was to                             use deep sedation / analgesia. Immediately prior to                             administration of medications, the patient was                             re-assessed for adequacy to receive sedatives. The                             heart rate, respiratory rate, oxygen saturations,                             blood pressure, adequacy of pulmonary ventilation,                             and response to care were monitored throughout the                             procedure. The physical status of the patient was                             re-assessed after the procedure. After obtaining                             informed consent, the endoscope was passed under                             direct vision. Throughout the procedure, the                             patient's blood pressure, pulse, and oxygen                             saturations were monitored continuously. The                             echoendoscope was introduced through the mouth, and                             advanced to the second part of duodenum. The upper                             EUS was accomplished without difficulty. The                              patient tolerated the procedure well.                                                                                     Findings:        White light and endosonographic findings :        The patient was left lateral under deep sedation and a linear        echoendoscope was used throughout. Limited white light imaging of the        foregut was notable for semisolid residual food in the stomach though        the lumen itself was unremarkable throughout. In terms of        endosonography, an anechoic lesion suggestive of a cyst was identified        in the pancreatic head measuring 37.8 mm by 29.3 mm in maximal        cross-sectional diameter. The main duct did not communicate with the        lesion which had a thin septation and two compartments. An outer wall of        the lesion was not seen and there was no solid internal component or        associated mass. There was no internal debris within the fluid-filled        cavity. The remainder of the pancreatic parenchyma was diffusely mildly        hetergeneous without lobularity or synchronous solid or liquid lesion.        The main duct was decompressed throghout measuring just over 2mm in the        head with smooth taper to less than 1mm in the body and andres. The        gallbladder is in situ and without wall thickening or internal solid        component. The common duct was traced from the bifurcaiton to the        ampulla and measured up to 7mm with smooth taper and was without wall        thickening or internal solid component. Views of the left and right        lobes were without concerning lesion. There was no evidence of        peripancreatic, perigastric, periportal or celiac region        lymphadenopathy. The major vascularity of the abdomen including the        portal, splenics, superior mesenterics, gastroduodenal, and celiac were        traditional. Diagnostic needle aspiration for fluid was performed of the        head cyst. Color Doppler imaging was  utilized prior to needle puncture        to confirm a lack of significant vascular structures within the needle        path. One pass was made with the 22 gauge needle using a transduodenal        approach. No stylet was used. The amount of fluid collected was 5 mL.        The fluid was clear and thin. Sample(s) were sent for amylase        concentration, mucin, cytology and CEA.                                                                                     Impression:               - Suggestion of mild gastroparesis with retained                             semisolid food in the stomach                             - Single 4cm thinly septated cystic lesion of the                             pancreatic head with features suggestion of a                             benign serous lesion (thin, no main duct                             communication, no concerning features); sampled                             - Otherwise unremarkable pancreaticobiliary system                             - No evidence of abdominal lymphadenopathy of major                             vascular finding   Recommendation:           - Deep sedation recovery with probable discharge                             home this afternoon                             - Complete a short course of antibiotic as                             prescribed                             - Avoid antithrombotics for at least 3 days; all                             other medications may resume without delay along                             with a diet and activity                             - Results of sampling will be communicated by Dr Cortez and will determine our next recommendation                             (suggestive of a serous cyst (favored)=surveillance                             MRI in 6m vs suggestive of a mucinous cyst=referral                             to a surgeon)                             - The findings and  recommendations were discussed                             with the patient and their family                                                                                     Procedure Code(s):        --- Professional ---        94800, Esophagogastroduodenoscopy, flexible, transoral; with        transendoscopic ultrasound-guided intramural or transmural fine needle        aspiration/biopsy(s), (includes endoscopic ultrasound examination        limited to the esophagus, stomach or duodenum, and adjacent structures)   Diagnosis Code(s):

## 2023-07-10 NOTE — DISCHARGE INSTRUCTIONS
Same Day Surgery Discharge Instructions for  Sedation and General Anesthesia     It's not unusual to feel dizzy, light-headed or faint for up to 24 hours after surgery or while taking pain medication.  If you have these symptoms: sit for a few minutes before standing and have someone assist you when you get up to walk or use the bathroom.    You should rest and relax for the next 24 hours. We recommend you make arrangements to have an adult stay with you for at least 24 hours after your discharge.  Avoid hazardous and strenuous activity.    DO NOT DRIVE any vehicle or operate mechanical equipment for 24 hours following the end of your surgery.  Even though you may feel normal, your reactions may be affected by the medication you have received.    Do not drink alcoholic beverages for 24 hours following surgery.     Slowly progress to your regular diet as you feel able. It's not unusual to feel nauseated and/or vomit after receiving anesthesia.  If you develop these symptoms, drink clear liquids (apple juice, ginger ale, broth, 7-up, etc. ) until you feel better.  If your nausea and vomiting persists for 24 hours, please notify your surgeon.      All narcotic pain medications, along with inactivity and anesthesia, can cause constipation. Drinking plenty of liquids and increasing fiber intake will help.    For any questions of a medical nature, call your surgeon.    Do not make important decisions for 24 hours.    If you had general anesthesia, you may have a sore throat for a couple of days related to the breathing tube used during surgery.  You may use Cepacol lozenges to help with this discomfort.  If it worsens or if you develop a fever, contact your surgeon.     If you feel your pain is not well managed with the pain medications prescribed by your surgeon, please contact your surgeon's office to let them know so they can address your concerns.       **If you have questions or concerns about your procedure,   call  Dr. Cortez at [phone]**

## 2023-07-10 NOTE — INTERVAL H&P NOTE
"I have reviewed the surgical (or preoperative) H&P that is linked to this encounter, and examined the patient. There are no significant changes    Clinical Conditions Present on Arrival:  Clinically Significant Risk Factors Present on Admission                 # Drug Induced Platelet Defect: home medication list includes an antiplatelet medication  # Overweight: Estimated body mass index is 25.67 kg/m  as calculated from the following:    Height as of this encounter: 1.727 m (5' 8\").    Weight as of this encounter: 76.6 kg (168 lb 12.8 oz).       "

## 2023-07-10 NOTE — ANESTHESIA CARE TRANSFER NOTE
Patient: Aubrey Duncan    Procedure: Procedure(s):  ENDOSCOPIC ULTRASOUND, ESOPHAGOSCOPY / UPPER GASTROINTESTINAL TRACT (GI) with fine needle aspiration       Diagnosis: Pancreatic cyst [K86.2]  Diagnosis Additional Information: No value filed.    Anesthesia Type:   MAC     Note:    Oropharynx: oropharynx clear of all foreign objects and spontaneously breathing  Level of Consciousness: awake  Oxygen Supplementation: nasal cannula  Level of Supplemental Oxygen (L/min / FiO2): 2  Independent Airway: airway patency satisfactory and stable  Dentition: dentition unchanged  Vital Signs Stable: post-procedure vital signs reviewed and stable  Report to RN Given: handoff report given  Patient transferred to: PACU    Handoff Report: Identifed the Patient, Identified the Reponsible Provider, Reviewed the pertinent medical history, Discussed the surgical course, Reviewed Intra-OP anesthesia mangement and issues during anesthesia, Set expectations for post-procedure period and Allowed opportunity for questions and acknowledgement of understanding      Vitals:  Vitals Value Taken Time   /78 07/10/23 1150   Temp     Pulse 74 07/10/23 1152   Resp 11 07/10/23 1152   SpO2 98 % 07/10/23 1152   Vitals shown include unvalidated device data.    Electronically Signed By: BRANDI Bryant CRNA  July 10, 2023  11:54 AM

## 2023-07-10 NOTE — ANESTHESIA POSTPROCEDURE EVALUATION
Patient: Aubrey Duncan    Procedure: Procedure(s):  ENDOSCOPIC ULTRASOUND, ESOPHAGOSCOPY / UPPER GASTROINTESTINAL TRACT (GI) with fine needle aspiration       Anesthesia Type:  MAC    Note:     Postop Pain Control: Uneventful            Sign Out: Well controlled pain   PONV: No   Neuro/Psych: Uneventful            Sign Out: Acceptable/Baseline neuro status   Airway/Respiratory: Uneventful            Sign Out: Acceptable/Baseline resp. status   CV/Hemodynamics: Uneventful            Sign Out: Acceptable CV status   Other NRE: NONE   DID A NON-ROUTINE EVENT OCCUR?            Last vitals:  Vitals Value Taken Time   /83 07/10/23 1200   Temp 36.1  C (97  F) 07/10/23 1200   Pulse 69 07/10/23 1206   Resp 19 07/10/23 1206   SpO2 97 % 07/10/23 1207   Vitals shown include unvalidated device data.    Electronically Signed By: Reed Tena MD  July 10, 2023  1:22 PM
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
## 2023-07-10 NOTE — LETTER
Patient:  Aubrey Duncan  :   1955  MRN:     [id number]        Mr.David MAURILIO Duncan  [address]  [address] [place] [id number]        2023    Dear ,    We are writing to inform you of your test results. In short, good news. The fluid analyses of the fluid collected from the cystic lesion returned consistent with a benign lesion without malignant potential. As discussed previously, our plan includes surveillance imaging (IV contrasted MRI/MRCP) in 6m and without evidence of significant change we will likely be able to stop following the lesion after that.    I have included the formal documtentation of the results below. It continues to be a pleasure participating in your care.  Please feel free to contact our clinic with any further questions.      Sincerely,    Wu Cortez MD PhD FACG REGGIE TODD  Professor of Medicine, Surgery and Pediatrics  Interventional and Therapeutic Endoscopy  Chief, Division of Gastroenterology and Hepatology and Nutrition  GI Service     [facility] [address] [place], [place] [address]    New Consultations  [phone]  Procedure Scheduling  [phone]  Clinical Nurse Coordinator [phone]  Clinical Fax   [phone]  Administrative   [phone]  Administrative Fax  [phone]        Resulted Orders   Pancreatic Cyst Panel (Includes Cytology)   Result Value Ref Range    CEA Fluid 94.5 ng/mL    Amylase fluid >7,500.0 U/L    Narrative    No reference ranges have been established. This result should be interpreted in the context of the patient's clinical condition and compared to simultaneous measurement in the patient's blood. This is a lab developed test. It has not been cleared or approved by the FDA. FDA clearance is not required for clinical use.   Cytology, non-gynecologic   Result Value Ref Range    Final Diagnosis       Specimen A     Interpretation:      Negative for  malignancy     Other Findings:      Mucicarmine stain is negative for intracellular or extracellular mucin.     Adequacy:     Satisfactory for evaluation, but limited by scant cellularity          Clinical Information       The patient is a 68 year old man with a pancreatic cyst.      Gross Description       A(A). Pancreas, Head, :A. Pancreas, Head, , Pancreatic Cyst Fluid:  Received 0.5 ml of clear, colorless fluid, processed 1 Pap stained cytospin and 1 fixed cytospin for mucicarmine.                  Performing Labs       The technical component of this testing was completed at [facility]

## 2023-07-12 LAB
PATH REPORT.COMMENTS IMP SPEC: NORMAL
PATH REPORT.FINAL DX SPEC: NORMAL
PATH REPORT.GROSS SPEC: NORMAL
PATH REPORT.RELEVANT HX SPEC: NORMAL

## 2023-07-13 ENCOUNTER — LAB (OUTPATIENT)
Dept: LAB | Facility: OTHER | Age: 68
End: 2023-07-13
Payer: MEDICARE

## 2023-07-13 DIAGNOSIS — Z94.2 LUNG REPLACED BY TRANSPLANT (H): ICD-10-CM

## 2023-07-13 LAB
ANION GAP SERPL CALCULATED.3IONS-SCNC: 9 MMOL/L (ref 7–15)
BUN SERPL-MCNC: 26.1 MG/DL (ref 8–23)
CALCIUM SERPL-MCNC: 8.5 MG/DL (ref 8.8–10.2)
CHLORIDE SERPL-SCNC: 108 MMOL/L (ref 98–107)
CREAT SERPL-MCNC: 1.01 MG/DL (ref 0.67–1.17)
DEPRECATED HCO3 PLAS-SCNC: 25 MMOL/L (ref 22–29)
ERYTHROCYTE [DISTWIDTH] IN BLOOD BY AUTOMATED COUNT: 13.2 % (ref 10–15)
GFR SERPL CREATININE-BSD FRML MDRD: 80 ML/MIN/1.73M2
GLUCOSE SERPL-MCNC: 94 MG/DL (ref 70–99)
HCT VFR BLD AUTO: 36.5 % (ref 40–53)
HGB BLD-MCNC: 11.8 G/DL (ref 13.3–17.7)
MAGNESIUM SERPL-MCNC: 1.9 MG/DL (ref 1.7–2.3)
MCH RBC QN AUTO: 34.7 PG (ref 26.5–33)
MCHC RBC AUTO-ENTMCNC: 32.3 G/DL (ref 31.5–36.5)
MCV RBC AUTO: 107 FL (ref 78–100)
PLATELET # BLD AUTO: 172 10E3/UL (ref 150–450)
POTASSIUM SERPL-SCNC: 4.8 MMOL/L (ref 3.4–5.3)
RBC # BLD AUTO: 3.4 10E6/UL (ref 4.4–5.9)
SODIUM SERPL-SCNC: 142 MMOL/L (ref 136–145)
WBC # BLD AUTO: 6.6 10E3/UL (ref 4–11)

## 2023-07-13 PROCEDURE — 80048 BASIC METABOLIC PNL TOTAL CA: CPT | Mod: ZL

## 2023-07-13 PROCEDURE — 36415 COLL VENOUS BLD VENIPUNCTURE: CPT | Mod: ZL

## 2023-07-13 PROCEDURE — 80197 ASSAY OF TACROLIMUS: CPT | Mod: ZL

## 2023-07-13 PROCEDURE — 85027 COMPLETE CBC AUTOMATED: CPT | Mod: ZL

## 2023-07-13 PROCEDURE — 83735 ASSAY OF MAGNESIUM: CPT | Mod: ZL

## 2023-07-14 ENCOUNTER — MYC MEDICAL ADVICE (OUTPATIENT)
Dept: FAMILY MEDICINE | Facility: OTHER | Age: 68
End: 2023-07-14
Payer: MEDICARE

## 2023-07-14 DIAGNOSIS — Z94.2 S/P LUNG TRANSPLANT (H): Chronic | ICD-10-CM

## 2023-07-14 LAB
CMV DNA SPEC NAA+PROBE-ACNC: NOT DETECTED IU/ML
TACROLIMUS BLD-MCNC: 5.9 UG/L (ref 5–15)
TME LAST DOSE: NORMAL H
TME LAST DOSE: NORMAL H

## 2023-07-14 RX ORDER — TACROLIMUS 0.5 MG/1
0.5 CAPSULE ORAL EVERY EVENING
Qty: 90 CAPSULE | Refills: 3 | Status: SHIPPED | OUTPATIENT
Start: 2023-07-14 | End: 2023-12-21

## 2023-07-14 RX ORDER — TACROLIMUS 1 MG/1
2 CAPSULE ORAL 2 TIMES DAILY
Qty: 120 CAPSULE | Refills: 11 | Status: SHIPPED | OUTPATIENT
Start: 2023-07-14 | End: 2023-12-21

## 2023-07-14 NOTE — TELEPHONE ENCOUNTER
Called [facility] pharmacy about Lantus being shipped.      It would be delivered to clinic and brought to inpatient pharmacy.      Called inpatient pharmacy to check on this.  They do have it.  If he comes to the clinic, one of the nurses can go to pharmacy and sign it out to give to patient.    Patient update on MyChart.    Svitlana Potts RN on 7/14/2023 at 10:52 AM

## 2023-07-14 NOTE — PROGRESS NOTES
Tacrolimus level 5.9 at 11.5 hours, on 7/13/23.  Goal 8-10.   Current dose 2 mg in AM, 2 mg in PM    Dose changed to 2 mg in AM, 2.5 mg in PM   Recheck level in 7-10 days.     [employer] message sent

## 2023-07-18 ENCOUNTER — OFFICE VISIT (OUTPATIENT)
Dept: DERMATOLOGY | Facility: OTHER | Age: 68
End: 2023-07-18
Attending: DERMATOLOGY
Payer: MEDICARE

## 2023-07-18 VITALS
HEART RATE: 96 BPM | OXYGEN SATURATION: 97 % | RESPIRATION RATE: 16 BRPM | DIASTOLIC BLOOD PRESSURE: 70 MMHG | SYSTOLIC BLOOD PRESSURE: 132 MMHG

## 2023-07-18 DIAGNOSIS — Z12.83 SCREENING FOR SKIN CANCER: ICD-10-CM

## 2023-07-18 DIAGNOSIS — L57.0 AK (ACTINIC KERATOSIS): ICD-10-CM

## 2023-07-18 DIAGNOSIS — D22.9 MULTIPLE BENIGN NEVI: Primary | ICD-10-CM

## 2023-07-18 DIAGNOSIS — Z94.89 TRANSPLANT RECIPIENT: ICD-10-CM

## 2023-07-18 PROCEDURE — G0463 HOSPITAL OUTPT CLINIC VISIT: HCPCS

## 2023-07-18 PROCEDURE — 17000 DESTRUCT PREMALG LESION: CPT | Performed by: DERMATOLOGY

## 2023-07-18 PROCEDURE — 17003 DESTRUCT PREMALG LES 2-14: CPT | Performed by: DERMATOLOGY

## 2023-07-18 PROCEDURE — 17000 DESTRUCT PREMALG LESION: CPT

## 2023-07-18 ASSESSMENT — PAIN SCALES - GENERAL: PAINLEVEL: NO PAIN (0)

## 2023-07-18 NOTE — PROGRESS NOTES
Visit Date: 2023    SUBJECTIVE:  Recheck visit for Aubrey, who is a gentleman who had a lung transplant and is being followed at the [facility] for that.  He came in a year ago in February and we found a few actinic keratoses and recommended 5-FU use.  He did use the 5-FU and has successfully treated the lesions.  He is accompanied by his wife today, who also tried the 5-FU, and had a good reaction.    OBJECTIVE:  Exam shows a very healthy and pleasant gentleman.  Examination of the face was carefully done.  Three actinic keratoses were noted, one on the right cheek, one on the nose and one on the dorsum of the left hand.  These were treated with liquid nitrogen.  He has many ecchymoses and I reassured him about that.     ASSESSMENT:  Advised that he return in 6 months and we will be happy to reevaluate the situation.  A very pleasant gentleman.    MEDICATIONS AND ALLERGIES:  Reviewed.    SAMMI Luz MD        D: 2023   T: 2023   MT: [facility]    Name:     AUBREY HAMLIN  MRN:      [id number]        Account:    [id number]   :      1955           Visit Date: 2023     Document: [id number]  
room air

## 2023-07-18 NOTE — LETTER
2023       RE: Aubrey Hamlin  [address]  [address] [place] [id number]     Dear Colleague,    Thank you for referring your patient, Aubrey Hamlin, to the [facility]. Please see a copy of my visit note below.    Visit Date: 2023    SUBJECTIVE:  Recheck visit for Aubrey, who is a gentleman who had a lung transplant and is being followed at the [facility] for that.  He came in a year ago in February and we found a few actinic keratoses and recommended 5-FU use.  He did use the 5-FU and has successfully treated the lesions.  He is accompanied by his wife today, who also tried the 5-FU, and had a good reaction.    OBJECTIVE:  Exam shows a very healthy and pleasant gentleman.  Examination of the face was carefully done.  Three actinic keratoses were noted, one on the right cheek, one on the nose and one on the dorsum of the left hand.  These were treated with liquid nitrogen.  He has many ecchymoses and I reassured him about that.     ASSESSMENT:  Advised that he return in 6 months and we will be happy to reevaluate the situation.  A very pleasant gentleman.    MEDICATIONS AND ALLERGIES:  Reviewed.    SAMMI Luz MD        D: 2023   T: 2023   MT: [facility]    Name:     AUBREY HAMLIN  MRN:      [id number]        Account:    [id number]   :      1955           Visit Date: 2023     Document: [id number]      Again, thank you for allowing me to participate in the care of your patient.      Sincerely,    SAMMI Luz MD

## 2023-07-19 ENCOUNTER — LAB (OUTPATIENT)
Dept: LAB | Facility: OTHER | Age: 68
End: 2023-07-19
Attending: INTERNAL MEDICINE
Payer: MEDICARE

## 2023-07-19 ENCOUNTER — HOSPITAL ENCOUNTER (OUTPATIENT)
Dept: GENERAL RADIOLOGY | Facility: OTHER | Age: 68
Discharge: HOME | End: 2023-07-19
Attending: INTERNAL MEDICINE
Payer: MEDICARE

## 2023-07-19 ENCOUNTER — TELEPHONE (OUTPATIENT)
Dept: FAMILY MEDICINE | Facility: OTHER | Age: 68
End: 2023-07-19

## 2023-07-19 ENCOUNTER — HOSPITAL ENCOUNTER (OUTPATIENT)
Dept: ULTRASOUND IMAGING | Facility: OTHER | Age: 68
Discharge: HOME | End: 2023-07-19
Attending: INTERNAL MEDICINE
Payer: MEDICARE

## 2023-07-19 DIAGNOSIS — Z94.2 LUNG REPLACED BY TRANSPLANT (H): ICD-10-CM

## 2023-07-19 DIAGNOSIS — K86.2 PANCREATIC CYST: Primary | ICD-10-CM

## 2023-07-19 LAB
ANION GAP SERPL CALCULATED.3IONS-SCNC: 10 MMOL/L (ref 7–15)
BUN SERPL-MCNC: 38 MG/DL (ref 8–23)
CALCIUM SERPL-MCNC: 9.2 MG/DL (ref 8.8–10.2)
CHLORIDE SERPL-SCNC: 107 MMOL/L (ref 98–107)
CREAT SERPL-MCNC: 1.27 MG/DL (ref 0.67–1.17)
DEPRECATED HCO3 PLAS-SCNC: 24 MMOL/L (ref 22–29)
ERYTHROCYTE [DISTWIDTH] IN BLOOD BY AUTOMATED COUNT: 13.3 % (ref 10–15)
GFR SERPL CREATININE-BSD FRML MDRD: 61 ML/MIN/1.73M2
GLUCOSE SERPL-MCNC: 83 MG/DL (ref 70–99)
HCT VFR BLD AUTO: 38.2 % (ref 40–53)
HGB BLD-MCNC: 12.4 G/DL (ref 13.3–17.7)
MAGNESIUM SERPL-MCNC: 1.9 MG/DL (ref 1.7–2.3)
MCH RBC QN AUTO: 34.8 PG (ref 26.5–33)
MCHC RBC AUTO-ENTMCNC: 32.5 G/DL (ref 31.5–36.5)
MCV RBC AUTO: 107 FL (ref 78–100)
PLATELET # BLD AUTO: 186 10E3/UL (ref 150–450)
POTASSIUM SERPL-SCNC: 4.7 MMOL/L (ref 3.4–5.3)
RBC # BLD AUTO: 3.56 10E6/UL (ref 4.4–5.9)
SODIUM SERPL-SCNC: 141 MMOL/L (ref 136–145)
TACROLIMUS BLD-MCNC: 10.1 UG/L (ref 5–15)
TME LAST DOSE: NORMAL H
TME LAST DOSE: NORMAL H
WBC # BLD AUTO: 8.5 10E3/UL (ref 4–11)

## 2023-07-19 PROCEDURE — 36415 COLL VENOUS BLD VENIPUNCTURE: CPT | Mod: ZL

## 2023-07-19 PROCEDURE — 71046 X-RAY EXAM CHEST 2 VIEWS: CPT

## 2023-07-19 PROCEDURE — 80048 BASIC METABOLIC PNL TOTAL CA: CPT | Mod: ZL

## 2023-07-19 PROCEDURE — 76536 US EXAM OF HEAD AND NECK: CPT

## 2023-07-19 PROCEDURE — 80197 ASSAY OF TACROLIMUS: CPT | Mod: ZL

## 2023-07-19 PROCEDURE — 85027 COMPLETE CBC AUTOMATED: CPT | Mod: ZL

## 2023-07-19 PROCEDURE — 83735 ASSAY OF MAGNESIUM: CPT | Mod: ZL

## 2023-07-19 NOTE — TELEPHONE ENCOUNTER
Patient came to the Unit 1 window asking for insulin.  [facility] pharmacy called and brought Lantus to this writer.  This writer confirmed patient's name and date of birth, then handed him the Lantus pens from [employer].      Vanda Yates LPN 7/19/2023 1:00 PM

## 2023-07-20 ENCOUNTER — HOSPITAL ENCOUNTER (OUTPATIENT)
Dept: CT IMAGING | Facility: OTHER | Age: 68
Discharge: HOME | End: 2023-07-20
Attending: INTERNAL MEDICINE
Payer: MEDICARE

## 2023-07-20 ENCOUNTER — HOSPITAL ENCOUNTER (OUTPATIENT)
Dept: RESPIRATORY THERAPY | Facility: OTHER | Age: 68
Discharge: HOME | End: 2023-07-20
Attending: INTERNAL MEDICINE
Payer: MEDICARE

## 2023-07-20 DIAGNOSIS — Z94.2 S/P LUNG TRANSPLANT (H): ICD-10-CM

## 2023-07-20 DIAGNOSIS — I10 ESSENTIAL HYPERTENSION: Chronic | ICD-10-CM

## 2023-07-20 DIAGNOSIS — Z94.2 LUNG REPLACED BY TRANSPLANT (H): ICD-10-CM

## 2023-07-20 LAB — CMV DNA SPEC NAA+PROBE-ACNC: NOT DETECTED IU/ML

## 2023-07-20 PROCEDURE — 94010 BREATHING CAPACITY TEST: CPT

## 2023-07-20 PROCEDURE — 94010 BREATHING CAPACITY TEST: CPT | Mod: 26 | Performed by: INTERNAL MEDICINE

## 2023-07-20 PROCEDURE — 999N000157 HC STATISTIC RCP TIME EA 10 MIN

## 2023-07-20 PROCEDURE — G1010 CDSM STANSON: HCPCS

## 2023-07-20 RX ORDER — CARVEDILOL 6.25 MG/1
6.25 TABLET ORAL 2 TIMES DAILY WITH MEALS
Qty: 120 TABLET | Refills: 0 | Status: SHIPPED | OUTPATIENT
Start: 2023-07-20 | End: 2023-10-03

## 2023-07-20 NOTE — RESULT ENCOUNTER NOTE
Not accurate 12 hour tacrolimus level on 7/19.  No dose change.  Plan to repeat tacro level with next set of labs.  [employer] message sent to pt.

## 2023-07-22 LAB
FEF 25/75: NORMAL
FEV-1: NORMAL
FEV1/FVC: NORMAL
FVC: NORMAL

## 2023-08-09 ENCOUNTER — TELEPHONE (OUTPATIENT)
Dept: FAMILY MEDICINE | Facility: OTHER | Age: 68
End: 2023-08-09
Payer: MEDICARE

## 2023-08-17 ENCOUNTER — MYC MEDICAL ADVICE (OUTPATIENT)
Dept: FAMILY MEDICINE | Facility: OTHER | Age: 68
End: 2023-08-17
Payer: MEDICARE

## 2023-08-17 DIAGNOSIS — E11.9 DIABETES MELLITUS TYPE 2, DIET-CONTROLLED (H): ICD-10-CM

## 2023-08-17 NOTE — TELEPHONE ENCOUNTER
Called patient, as I could not find the pharmacy to send pen needles to.      [facility].  Patient gave me number: [phone] [address] [place], [place]     Called and the number is for:  [facility].    They said this has to go through [facility].      Was able to find pharmacy with matching address, but rep said that I can not send it straight to them, as they have to get these requests FROM [employer].      Reached out to Hoa Olivarez's nurse.  She knows how to submit these.  Routing to [facility] for follow-up at her request.     Svitlana Potts RN on 8/17/2023 at 11:24 AM

## 2023-08-21 ENCOUNTER — VIRTUAL VISIT (OUTPATIENT)
Dept: VASCULAR SURGERY | Facility: CLINIC | Age: 68
End: 2023-08-21
Payer: MEDICARE

## 2023-08-21 VITALS
SYSTOLIC BLOOD PRESSURE: 150 MMHG | WEIGHT: 166 LBS | HEART RATE: 77 BPM | DIASTOLIC BLOOD PRESSURE: 78 MMHG | BODY MASS INDEX: 25.24 KG/M2

## 2023-08-21 DIAGNOSIS — I73.9 PAD (PERIPHERAL ARTERY DISEASE) (H): Primary | ICD-10-CM

## 2023-08-21 PROCEDURE — 99215 OFFICE O/P EST HI 40 MIN: CPT | Mod: VID | Performed by: NURSE PRACTITIONER

## 2023-08-21 ASSESSMENT — PAIN SCALES - GENERAL: PAINLEVEL: NO PAIN (0)
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
## 2023-08-21 NOTE — PROGRESS NOTES
Virtual Visit Details    Type of service:  Video Visit   Joined the call at 8/21/2023, 11:02:05 am.  Left the call at 8/21/2023, 11:39:24 am.  You were on the call for 37 minutes 18 seconds .      Originating Location (pt. Location): Home    Distant Location (provider location):  On-site  Platform used for Video Visit: [facility]      Type of Visit: Virtual: [facility]    Patient is here for a return visit to discuss  PAD .       BP (!) 150/78   Pulse 77   Wt 75.3 kg (166 lb)   BMI 25.24 kg/m      Questions patient would like addressed today are: NA    Refills are needed: No    How would you like to obtain your AVS? Sundeep Cordoba MA

## 2023-08-21 NOTE — NURSING NOTE
Is the patient currently in the state of [place]? YES    Visit mode:VIDEO    If the visit is dropped, the patient can be reconnected by: VIDEO VISIT: Text to cell phone:   Telephone Information:   Mobile [phone]    and VIDEO VISIT: Send to e-mail at: [email or web address]    Will anyone else be joining the visit? Pts wife Kyung  (If patient encounters technical issues they should call [phone])    How would you like to obtain your AVS? MyChart    Are changes needed to the allergy or medication list? Pt declined allergy review and Pt declined med review    Patient declined individual allergy and medication review by support staff because patient denies any changes since echeck-in completion and states all information entered during echeck-in remains accurate.    Reason for visit: SALLIE Cordoba MA VVF

## 2023-08-21 NOTE — PATIENT INSTRUCTIONS
Thank you so much for choosing us for your care. It was a pleasure to see you at the vascular clinic today.    Recommend:  1.  Continue best medical therapy with aspirin, Plavix, statin  2.  30 minutes treadmill exercises daily and follow-up with me at 2 weeks to inform of progress  3.  We will repeat arterial studies given last once completed last year  4.  Follow-up with me in 3 months per patient's request virtual visit  5.  We will place referral to Dr. Derick Carballo vascular medicine for potential other medical management options.    Please do not hesitate to reach out to us in the meantime with any concerns. Our schedulers will get in touch with you to set up your follow-up visit.        Our scheduling team will get in touch with you to set up any follow-up testing/imaging and/or appointments. Please be aware that any testing/imaging recommended today will need to completed prior to your next visit with the provider. If testing/imaging is not completed prior to your next visit, your visit may be rescheduled.        If you have any questions, please contact our clinic directly at [phone] and ask for the nurse. We also encourage the use of HipSwap to communicate with your HealthCare Provider.        If you have an urgent need after business hours (8:00 am to 4:30 pm) please call [phone], option 4, and ask for the vascular attending on call. For non-urgent after hours needs, please call the vascular clinic at [phone]. For scheduling needs, please call our clinic directly at [phone].    
56

## 2023-08-22 ENCOUNTER — VIRTUAL VISIT (OUTPATIENT)
Dept: HEMATOLOGY | Facility: CLINIC | Age: 68
End: 2023-08-22
Attending: STUDENT IN AN ORGANIZED HEALTH CARE EDUCATION/TRAINING PROGRAM
Payer: MEDICARE

## 2023-08-22 DIAGNOSIS — I27.82 CHRONIC PULMONARY EMBOLISM, UNSPECIFIED PULMONARY EMBOLISM TYPE, UNSPECIFIED WHETHER ACUTE COR PULMONALE PRESENT (H): Primary | ICD-10-CM

## 2023-08-22 PROCEDURE — 99214 OFFICE O/P EST MOD 30 MIN: CPT | Mod: VID | Performed by: STUDENT IN AN ORGANIZED HEALTH CARE EDUCATION/TRAINING PROGRAM

## 2023-08-31 DIAGNOSIS — I10 ESSENTIAL HYPERTENSION: ICD-10-CM

## 2023-08-31 RX ORDER — FUROSEMIDE 20 MG
20 TABLET ORAL DAILY
Qty: 90 TABLET | Refills: 4 | Status: SHIPPED | OUTPATIENT
Start: 2023-08-31

## 2023-08-31 NOTE — PROGRESS NOTES
History of Present Illness - Aubrey Duncan is a very pleasant 68 year old male here to see me for the first time for sinus disease.    He tells me that his nasal issues are especially bad in the winter, when he has a lot of nasal irrigation and blow out bloody crusts and drainage.  He was tried on flonase and that did seem to help.  And he has continued to use saline sprays as well, and that helps keep things moist.    Otherwise no previous ENT surgery.    A CT Sinus was done on 7/20/2023 and personally reviewed for this consultation.  It showed mild deviation of the septum to the RIGHT>  But otherwise no significant mucosal disease, polyposis or any other abnormality was noted.      Past Medical History -   Patient Active Problem List   Diagnosis    Alpha-1-antitrypsin deficiency (H)    S/P lung transplant (H)    Steroid-induced diabetes mellitus (H)    CMV (cytomegalovirus infection) (H)    Prophylactic antibiotic    Chronic obstructive pulmonary disease (H)    Coronary atherosclerosis    Contact dermatitis and eczema    Gout    Male erectile dysfunction, unspecified    Osteopenia    Seborrheic keratosis    Thoracic back pain    Thoracic segment dysfunction    Gastroesophageal reflux disease, esophagitis presence not specified    Diverticulosis of large intestine without hemorrhage    Essential hypertension    H/O basal cell carcinoma excision    Type 2 diabetes mellitus with diabetic polyneuropathy, with long-term current use of insulin (H)    Chronic kidney disease, stage 3b (H)    Acute renal failure superimposed on stage 3 chronic kidney disease, unspecified acute renal failure type, unspecified whether stage 3a or 3b CKD (H)    Tubular adenoma    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)       Current Medications -   Current Outpatient Medications:     acetaminophen (TYLENOL) 325 MG tablet, Take 2 tablets (650 mg) by mouth every 6 hours as needed for mild pain, Disp: 60 tablet, Rfl: 0    albuterol (PROAIR  HFA/PROVENTIL HFA/VENTOLIN HFA) 108 (90 Base) MCG/ACT inhaler, Inhale 1-2 puffs into the lungs every 6 hours as needed for shortness of breath or wheezing, Disp: 8.5 g, Rfl: 3    amLODIPine (NORVASC) 10 MG tablet, Take 1 tablet (10 mg) by mouth daily, Disp: 90 tablet, Rfl: 3    aspirin (ASA) 81 MG EC tablet, Take 1 tablet (81 mg) by mouth daily, Disp: 90 tablet, Rfl: 3    azaTHIOprine (IMURAN) 50 MG tablet, Take 1 tablet (50 mg) by mouth daily, Disp: 30 tablet, Rfl: 11    azithromycin (ZITHROMAX) 250 MG tablet, Take 1 tablet (250 mg) by mouth three times a week, Disp: 38 tablet, Rfl: 3    bisacodyl (DULCOLAX) 5 MG EC tablet, Use as directed per colonoscopy prep., Disp: 2 tablet, Rfl: 0    blood glucose (NO BRAND SPECIFIED) test strip, Use to test blood sugar 3 times daily. Dispense as covered by insurance. Dx. Code: E11.9. Preferred brand: Contour Next., Disp: 300 strip, Rfl: 11    blood glucose (NO BRAND SPECIFIED) test strip, USE TO TEST BLOOD GLUCOSE 3TIMES DAILY. Dispense as covered by insurance. DX CODE:E11.9, Disp: 300 strip, Rfl: 1    Calcium Carbonate-Vitamin D 600-10 MG-MCG TABS, Take 1 tablet by mouth 2 times daily (with meals), Disp: 60 tablet, Rfl: 11    carvedilol (COREG) 6.25 MG tablet, TAKE 1 TABLET (6.25 MG) BY MOUTH 2 TIMES DAILY (WITH MEALS), Disp: 120 tablet, Rfl: 0    clopidogrel (PLAVIX) 75 MG tablet, Take 1 tablet (75 mg) by mouth daily, Disp: 90 tablet, Rfl: 3    dapsone (ACZONE) 25 MG tablet, Take 2 tablets (50 mg) by mouth daily, Disp: 180 tablet, Rfl: 3    econazole nitrate 1 % external cream, Apply topically daily To feet and heels., Disp: 85 g, Rfl: 3    fludrocortisone (FLORINEF) 0.1 MG tablet, Take 1 tablet (0.1 mg) by mouth daily, Disp: 90 tablet, Rfl: 3    fluorouracil (EFUDEX) 5 % external cream, Apply topically daily Use once per day for 10 days on areas of scalp, forehead and face that are scaled and gritty (one month on the central forehead). Avoid eyes., Disp: 40 g, Rfl: 1     fluticasone-salmeterol (ADVAIR-HFA) 230-21 MCG/ACT inhaler, Inhale 2 puffs into the lungs 2 times daily, Disp: 8 g, Rfl: 11    furosemide (LASIX) 20 MG tablet, Take 1 tablet (20 mg) by mouth daily, Disp: 90 tablet, Rfl: 4    insulin aspart (NOVOLOG PEN) 100 UNIT/ML pen, Take 5 U am, 3 unit(s) non, 5 unit(s) pm of insulin within 30 minutes of start of breakfast, lunch, and dinner. Do not give if blood sugar is less than 70 mg/dl., Disp: , Rfl:     insulin glargine (LANTUS PEN) 100 UNIT/ML pen, Inject 18 Units Subcutaneous every morning (before breakfast), Disp: , Rfl:     insulin pen needle (32G X 4 MM) 32G X 4 MM miscellaneous, Use 4 pen needles daily or as directed. Dispense as insurance allows. Dx. Code: E09.9, Disp: 400 each, Rfl: 11    Lancet Devices (MICROLET NEXT LANCING DEVICE) MISC, USE AS DIRECTED, Disp: 1 each, Rfl: 3    lisinopril (ZESTRIL) 40 MG tablet, TAKE 1 TABLET (40 MG) BY MOUTH EVERY DAY (Patient taking differently: Take 40 mg by mouth daily Morning), Disp: 90 tablet, Rfl: 0    loperamide (IMODIUM) 2 MG capsule, Take 1 capsule (2 mg) by mouth 4 times daily as needed for diarrhea, Disp: 120 capsule, Rfl: 12    magnesium oxide (MAG-OX) 400 MG tablet, Take 1 tablet (400 mg) by mouth 2 times daily, Disp: 180 tablet, Rfl: 3    Microlet Lancets MISC, USE TO TEST BLOOD GLUCOSE 4 TIMES DAILY. DISPENSE AS COVERED BY INSURANCE. DX. CODE: E11.9., Disp: 400 each, Rfl: 1    montelukast (SINGULAIR) 10 MG tablet, Take 1 tablet (10 mg) by mouth every evening, Disp: 90 tablet, Rfl: 3    multivitamin, therapeutic (THERA-VIT) TABS, Take 1 tablet by mouth daily, Disp: 30 tablet, Rfl: 12    omeprazole (PRILOSEC) 20 MG DR capsule, Take 1 capsule (20 mg) by mouth 2 times daily (before meals), Disp: 180 capsule, Rfl: 3    ondansetron (ZOFRAN) 4 MG tablet, Take 1 tablet (4 mg) by mouth every 6 hours as needed for nausea, Disp: 30 tablet, Rfl: 1    order for DME, Equipment being ordered: diabetic shoes, Disp: 1 each, Rfl:  "0    predniSONE (DELTASONE) 5 MG tablet, TAKE ONE TABLET BY MOUTH ONCE DAILY IN THE MORNING AND ONE-HALF TABLET BY MOUTH IN THE EVENING, Disp: 45 tablet, Rfl: 12    rosuvastatin (CRESTOR) 40 MG tablet, TAKE 1/2 TABLET (20 MG) BY MOUTH three times a week, Disp: 90 tablet, Rfl: 3    sildenafil (VIAGRA) 25 MG tablet, Take 1 tablet (25 mg) by mouth as needed (as needed), Disp: 32 tablet, Rfl: 11    tacrolimus (GENERIC EQUIVALENT) 0.5 MG capsule, Take 1 capsule (0.5 mg) by mouth every evening Total dose: 2 mg in the AM and 2.5 mg in the PM, Disp: 90 capsule, Rfl: 3    tacrolimus (GENERIC EQUIVALENT) 1 MG capsule, Take 2 capsules (2 mg) by mouth 2 times daily Total dose: 2 mg in AM and 2.5 mg in PM, Disp: 120 capsule, Rfl: 11    valGANciclovir (VALCYTE) 450 MG tablet, TAKE ONE TABLETS (450MG) BY MOUTH TWO TIMES A DAY, Disp: 60 tablet, Rfl: 11    Allergies -   Allergies   Allergen Reactions    Heparin Other (See Comments)     HIT positive and AUGUST positive    No Heparin Antibody Identified on 8/15 blood test    Oxycodone Other (See Comments)     Significant lethargy, confusion. Tolerates dilaudid well.     Fluocinolone Other (See Comments)     Tendon problems      Levaquin Muscle Pain (Myalgia)    Pneumococcal Vaccine Other (See Comments)     Other reaction(s): Fever  \"My arm swelled up like a balloon.\"    Varicella Zoster Immune Globulin Swelling       Social History -   Social History     Socioeconomic History    Marital status:      Spouse name: Kyung    Number of children: 1   Occupational History    Occupation: Sanitation business owner; construction     Employer: [employer]   Tobacco Use    Smoking status: Former     Packs/day: 2.00     Years: 15.00     Pack years: 30.00     Types: Cigarettes     Quit date: 1986     Years since quittin.6     Passive exposure: Past    Smokeless tobacco: Never   Vaping Use    Vaping Use: Never used   Substance and Sexual Activity    Alcohol use: No     Alcohol/week: 0.0 " standard drinks of alcohol    Drug use: No    Sexual activity: Yes     Partners: Female       Family History -   Family History   Problem Relation Age of Onset    Heart Failure Mother          with CHF at age 95    Asthma Mother     C.A.D. Mother     Asthma Sister     Diabetes Sister     Hypertension Sister     Hypertension Daughter     Other - See Comments Sister         bleeding disorder    Other - See Comments Daughter         fibromyalgia    Cerebrovascular Disease Father          at age 83 with ministrokes; had arthritis as a farmer    Skin Cancer No family hx of     Melanoma No family hx of        Review of Systems - As per HPI and PMHx, otherwise 10+ system review of the head and neck, and general constitution is negative.    Physical Exam  BP (!) 170/78   Pulse 78   Wt 77.1 kg (170 lb)   BMI 25.85 kg/m        General - The patient is well nourished and well developed, and appears to have good nutritional status.  Alert and oriented to person and place, answers questions and cooperates with examination appropriately.   Head and Face - Normocephalic and atraumatic, with no gross asymmetry noted of the contour of the facial features.  The facial nerve is intact, with strong symmetric movements.  Voice and Breathing - The patient was breathing comfortably without the use of accessory muscles. There was no wheezing, stridor, or stertor.  The patients voice was clear and strong, and had appropriate pitch and quality.  Ears - The tympanic membranes are normal in appearance, bony landmarks are intact.  No retraction, perforation, or masses.  No fluid or purulence was seen in the external canal or the middle ear. No evidence of infection of the middle ear or external canal, cerumen was normal in appearance.  Eyes - Extraocular movements intact, and the pupils were reactive to light.  Sclera were not icteric or injected, conjunctiva were pink and moist.  Mouth - Examination of the oral cavity showed pink,  healthy oral mucosa. No lesions or ulcerations noted.  The tongue was mobile and midline, and the dentition were in good condition.    Throat - The walls of the oropharynx were smooth, pink, moist, symmetric, and had no lesions or ulcerations.  The tonsillar pillars and soft palate were symmetric.  The uvula was midline on elevation.    Neck - Normal midline excursion of the laryngotracheal complex during swallowing.  Full range of motion on passive movement.  Palpation of the occipital, submental, submandibular, internal jugular chain, and supraclavicular nodes did not demonstrate any abnormal lymph nodes or masses.  The carotid pulse was palpable bilaterally.  Palpation of the thyroid was soft and smooth, with no nodules or goiter appreciated.  The trachea was mobile and midline.  Nose - External contour is symmetric, no gross deflection or scars.  Nasal mucosa is pink and moist with no abnormal mucus.  The septum was widely deviated to the RIGHT and obstructive, but there is also notable scarring at the leading edge of the septum in the nasal introitus.      A/P - Aubrey Duncan is a 68 year old male  (J34.2) Deviated nasal septum  (primary encounter diagnosis)  (J32.0) Chronic maxillary sinusitis  (R04.0) Epistaxis    Based on the CT sinus and exam, I do not think the issue is chronic infection.  Instead I believe that his septal deviation has cause a site of chronic ulceration, crusting and scabbing at the RIGHT anterior nasal septum    I have recommended aggressive moisturization with Bashir Med saline irrigations, Bashir Med nasogel, and also aquaphor, especially in the winter    Follow-up if there are any questions or recurrent bleeding

## 2023-09-02 DIAGNOSIS — I10 ESSENTIAL HYPERTENSION: Chronic | ICD-10-CM

## 2023-09-07 ENCOUNTER — OFFICE VISIT (OUTPATIENT)
Dept: OTOLARYNGOLOGY | Facility: CLINIC | Age: 68
End: 2023-09-07
Attending: NURSE PRACTITIONER
Payer: MEDICARE

## 2023-09-07 VITALS
HEART RATE: 78 BPM | WEIGHT: 170 LBS | SYSTOLIC BLOOD PRESSURE: 170 MMHG | DIASTOLIC BLOOD PRESSURE: 78 MMHG | BODY MASS INDEX: 25.85 KG/M2

## 2023-09-07 DIAGNOSIS — R04.0 EPISTAXIS: ICD-10-CM

## 2023-09-07 DIAGNOSIS — J34.2 DEVIATED NASAL SEPTUM: Primary | ICD-10-CM

## 2023-09-07 DIAGNOSIS — J32.0 CHRONIC MAXILLARY SINUSITIS: ICD-10-CM

## 2023-09-07 PROCEDURE — 99203 OFFICE O/P NEW LOW 30 MIN: CPT | Performed by: OTOLARYNGOLOGY

## 2023-09-07 NOTE — LETTER
9/7/2023         RE: Aubrey Duncan  [address]  [address] [place] [id number]        Dear Colleague,    Thank you for referring your patient, Aubrey Duncan, to the [facility]. Please see a copy of my visit note below.    History of Present Illness - Aubrey Duncan is a very pleasant 68 year old male here to see me for the first time for sinus disease.    He tells me that his nasal issues are especially bad in the winter, when he has a lot of nasal irrigation and blow out bloody crusts and drainage.  He was tried on flonase and that did seem to help.  And he has continued to use saline sprays as well, and that helps keep things moist.    Otherwise no previous ENT surgery.    A CT Sinus was done on 7/20/2023 and personally reviewed for this consultation.  It showed mild deviation of the septum to the RIGHT>  But otherwise no significant mucosal disease, polyposis or any other abnormality was noted.      Past Medical History -   Patient Active Problem List   Diagnosis     Alpha-1-antitrypsin deficiency (H)     S/P lung transplant (H)     Steroid-induced diabetes mellitus (H)     CMV (cytomegalovirus infection) (H)     Prophylactic antibiotic     Chronic obstructive pulmonary disease (H)     Coronary atherosclerosis     Contact dermatitis and eczema     Gout     Male erectile dysfunction, unspecified     Osteopenia     Seborrheic keratosis     Thoracic back pain     Thoracic segment dysfunction     Gastroesophageal reflux disease, esophagitis presence not specified     Diverticulosis of large intestine without hemorrhage     Essential hypertension     H/O basal cell carcinoma excision     Type 2 diabetes mellitus with diabetic polyneuropathy, with long-term current use of insulin (H)     Chronic kidney disease, stage 3b (H)     Acute renal failure superimposed on stage 3 chronic kidney disease, unspecified acute renal failure type, unspecified whether stage 3a or 3b CKD (H)     Tubular  adenoma     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)       Current Medications -   Current Outpatient Medications:      acetaminophen (TYLENOL) 325 MG tablet, Take 2 tablets (650 mg) by mouth every 6 hours as needed for mild pain, Disp: 60 tablet, Rfl: 0     albuterol (PROAIR HFA/PROVENTIL HFA/VENTOLIN HFA) 108 (90 Base) MCG/ACT inhaler, Inhale 1-2 puffs into the lungs every 6 hours as needed for shortness of breath or wheezing, Disp: 8.5 g, Rfl: 3     amLODIPine (NORVASC) 10 MG tablet, Take 1 tablet (10 mg) by mouth daily, Disp: 90 tablet, Rfl: 3     aspirin (ASA) 81 MG EC tablet, Take 1 tablet (81 mg) by mouth daily, Disp: 90 tablet, Rfl: 3     azaTHIOprine (IMURAN) 50 MG tablet, Take 1 tablet (50 mg) by mouth daily, Disp: 30 tablet, Rfl: 11     azithromycin (ZITHROMAX) 250 MG tablet, Take 1 tablet (250 mg) by mouth three times a week, Disp: 38 tablet, Rfl: 3     bisacodyl (DULCOLAX) 5 MG EC tablet, Use as directed per colonoscopy prep., Disp: 2 tablet, Rfl: 0     blood glucose (NO BRAND SPECIFIED) test strip, Use to test blood sugar 3 times daily. Dispense as covered by insurance. Dx. Code: E11.9. Preferred brand: Contour Next., Disp: 300 strip, Rfl: 11     blood glucose (NO BRAND SPECIFIED) test strip, USE TO TEST BLOOD GLUCOSE 3TIMES DAILY. Dispense as covered by insurance. DX CODE:E11.9, Disp: 300 strip, Rfl: 1     Calcium Carbonate-Vitamin D 600-10 MG-MCG TABS, Take 1 tablet by mouth 2 times daily (with meals), Disp: 60 tablet, Rfl: 11     carvedilol (COREG) 6.25 MG tablet, TAKE 1 TABLET (6.25 MG) BY MOUTH 2 TIMES DAILY (WITH MEALS), Disp: 120 tablet, Rfl: 0     clopidogrel (PLAVIX) 75 MG tablet, Take 1 tablet (75 mg) by mouth daily, Disp: 90 tablet, Rfl: 3     dapsone (ACZONE) 25 MG tablet, Take 2 tablets (50 mg) by mouth daily, Disp: 180 tablet, Rfl: 3     econazole nitrate 1 % external cream, Apply topically daily To feet and heels., Disp: 85 g, Rfl: 3     fludrocortisone (FLORINEF) 0.1 MG tablet, Take 1  tablet (0.1 mg) by mouth daily, Disp: 90 tablet, Rfl: 3     fluorouracil (EFUDEX) 5 % external cream, Apply topically daily Use once per day for 10 days on areas of scalp, forehead and face that are scaled and gritty (one month on the central forehead). Avoid eyes., Disp: 40 g, Rfl: 1     fluticasone-salmeterol (ADVAIR-HFA) 230-21 MCG/ACT inhaler, Inhale 2 puffs into the lungs 2 times daily, Disp: 8 g, Rfl: 11     furosemide (LASIX) 20 MG tablet, Take 1 tablet (20 mg) by mouth daily, Disp: 90 tablet, Rfl: 4     insulin aspart (NOVOLOG PEN) 100 UNIT/ML pen, Take 5 U am, 3 unit(s) non, 5 unit(s) pm of insulin within 30 minutes of start of breakfast, lunch, and dinner. Do not give if blood sugar is less than 70 mg/dl., Disp: , Rfl:      insulin glargine (LANTUS PEN) 100 UNIT/ML pen, Inject 18 Units Subcutaneous every morning (before breakfast), Disp: , Rfl:      insulin pen needle (32G X 4 MM) 32G X 4 MM miscellaneous, Use 4 pen needles daily or as directed. Dispense as insurance allows. Dx. Code: E09.9, Disp: 400 each, Rfl: 11     Lancet Devices (MICROLET NEXT LANCING DEVICE) MISC, USE AS DIRECTED, Disp: 1 each, Rfl: 3     lisinopril (ZESTRIL) 40 MG tablet, TAKE 1 TABLET (40 MG) BY MOUTH EVERY DAY (Patient taking differently: Take 40 mg by mouth daily Morning), Disp: 90 tablet, Rfl: 0     loperamide (IMODIUM) 2 MG capsule, Take 1 capsule (2 mg) by mouth 4 times daily as needed for diarrhea, Disp: 120 capsule, Rfl: 12     magnesium oxide (MAG-OX) 400 MG tablet, Take 1 tablet (400 mg) by mouth 2 times daily, Disp: 180 tablet, Rfl: 3     Microlet Lancets MISC, USE TO TEST BLOOD GLUCOSE 4 TIMES DAILY. DISPENSE AS COVERED BY INSURANCE. DX. CODE: E11.9., Disp: 400 each, Rfl: 1     montelukast (SINGULAIR) 10 MG tablet, Take 1 tablet (10 mg) by mouth every evening, Disp: 90 tablet, Rfl: 3     multivitamin, therapeutic (THERA-VIT) TABS, Take 1 tablet by mouth daily, Disp: 30 tablet, Rfl: 12     omeprazole (PRILOSEC) 20 MG DR  "capsule, Take 1 capsule (20 mg) by mouth 2 times daily (before meals), Disp: 180 capsule, Rfl: 3     ondansetron (ZOFRAN) 4 MG tablet, Take 1 tablet (4 mg) by mouth every 6 hours as needed for nausea, Disp: 30 tablet, Rfl: 1     order for DME, Equipment being ordered: diabetic shoes, Disp: 1 each, Rfl: 0     predniSONE (DELTASONE) 5 MG tablet, TAKE ONE TABLET BY MOUTH ONCE DAILY IN THE MORNING AND ONE-HALF TABLET BY MOUTH IN THE EVENING, Disp: 45 tablet, Rfl: 12     rosuvastatin (CRESTOR) 40 MG tablet, TAKE 1/2 TABLET (20 MG) BY MOUTH three times a week, Disp: 90 tablet, Rfl: 3     sildenafil (VIAGRA) 25 MG tablet, Take 1 tablet (25 mg) by mouth as needed (as needed), Disp: 32 tablet, Rfl: 11     tacrolimus (GENERIC EQUIVALENT) 0.5 MG capsule, Take 1 capsule (0.5 mg) by mouth every evening Total dose: 2 mg in the AM and 2.5 mg in the PM, Disp: 90 capsule, Rfl: 3     tacrolimus (GENERIC EQUIVALENT) 1 MG capsule, Take 2 capsules (2 mg) by mouth 2 times daily Total dose: 2 mg in AM and 2.5 mg in PM, Disp: 120 capsule, Rfl: 11     valGANciclovir (VALCYTE) 450 MG tablet, TAKE ONE TABLETS (450MG) BY MOUTH TWO TIMES A DAY, Disp: 60 tablet, Rfl: 11    Allergies -   Allergies   Allergen Reactions     Heparin Other (See Comments)     HIT positive and AUGUST positive    No Heparin Antibody Identified on 8/15 blood test     Oxycodone Other (See Comments)     Significant lethargy, confusion. Tolerates dilaudid well.      Fluocinolone Other (See Comments)     Tendon problems       Levaquin Muscle Pain (Myalgia)     Pneumococcal Vaccine Other (See Comments)     Other reaction(s): Fever  \"My arm swelled up like a balloon.\"     Varicella Zoster Immune Globulin Swelling       Social History -   Social History     Socioeconomic History     Marital status:      Spouse name: Kyung     Number of children: 1   Occupational History     Occupation: Sanitation business owner; construction     Employer: [employer]   Tobacco Use     Smoking " status: Former     Packs/day: 2.00     Years: 15.00     Pack years: 30.00     Types: Cigarettes     Quit date: 1986     Years since quittin.6     Passive exposure: Past     Smokeless tobacco: Never   Vaping Use     Vaping Use: Never used   Substance and Sexual Activity     Alcohol use: No     Alcohol/week: 0.0 standard drinks of alcohol     Drug use: No     Sexual activity: Yes     Partners: Female       Family History -   Family History   Problem Relation Age of Onset     Heart Failure Mother          with CHF at age 95     Asthma Mother      C.A.D. Mother      Asthma Sister      Diabetes Sister      Hypertension Sister      Hypertension Daughter      Other - See Comments Sister         bleeding disorder     Other - See Comments Daughter         fibromyalgia     Cerebrovascular Disease Father          at age 83 with ministrokes; had arthritis as a farmer     Skin Cancer No family hx of      Melanoma No family hx of        Review of Systems - As per HPI and PMHx, otherwise 10+ system review of the head and neck, and general constitution is negative.    Physical Exam  BP (!) 170/78   Pulse 78   Wt 77.1 kg (170 lb)   BMI 25.85 kg/m        General - The patient is well nourished and well developed, and appears to have good nutritional status.  Alert and oriented to person and place, answers questions and cooperates with examination appropriately.   Head and Face - Normocephalic and atraumatic, with no gross asymmetry noted of the contour of the facial features.  The facial nerve is intact, with strong symmetric movements.  Voice and Breathing - The patient was breathing comfortably without the use of accessory muscles. There was no wheezing, stridor, or stertor.  The patients voice was clear and strong, and had appropriate pitch and quality.  Ears - The tympanic membranes are normal in appearance, bony landmarks are intact.  No retraction, perforation, or masses.  No fluid or purulence was seen in  the external canal or the middle ear. No evidence of infection of the middle ear or external canal, cerumen was normal in appearance.  Eyes - Extraocular movements intact, and the pupils were reactive to light.  Sclera were not icteric or injected, conjunctiva were pink and moist.  Mouth - Examination of the oral cavity showed pink, healthy oral mucosa. No lesions or ulcerations noted.  The tongue was mobile and midline, and the dentition were in good condition.    Throat - The walls of the oropharynx were smooth, pink, moist, symmetric, and had no lesions or ulcerations.  The tonsillar pillars and soft palate were symmetric.  The uvula was midline on elevation.    Neck - Normal midline excursion of the laryngotracheal complex during swallowing.  Full range of motion on passive movement.  Palpation of the occipital, submental, submandibular, internal jugular chain, and supraclavicular nodes did not demonstrate any abnormal lymph nodes or masses.  The carotid pulse was palpable bilaterally.  Palpation of the thyroid was soft and smooth, with no nodules or goiter appreciated.  The trachea was mobile and midline.  Nose - External contour is symmetric, no gross deflection or scars.  Nasal mucosa is pink and moist with no abnormal mucus.  The septum was widely deviated to the RIGHT and obstructive, but there is also notable scarring at the leading edge of the septum in the nasal introitus.      A/P - Aubrey Duncan is a 68 year old male  (J34.2) Deviated nasal septum  (primary encounter diagnosis)  (J32.0) Chronic maxillary sinusitis  (R04.0) Epistaxis    Based on the CT sinus and exam, I do not think the issue is chronic infection.  Instead I believe that his septal deviation has cause a site of chronic ulceration, crusting and scabbing at the RIGHT anterior nasal septum    I have recommended aggressive moisturization with Bashir Med saline irrigations, Bashir Med nasogel, and also aquaphor, especially in the  winter    Follow-up if there are any questions or recurrent bleeding      Again, thank you for allowing me to participate in the care of your patient.        Sincerely,        Jonny Rizvi MD

## 2023-09-08 DIAGNOSIS — N52.9 ED (ERECTILE DYSFUNCTION): ICD-10-CM

## 2023-09-08 RX ORDER — SILDENAFIL 25 MG/1
25 TABLET, FILM COATED ORAL PRN
Qty: 32 TABLET | Refills: 11 | Status: CANCELLED | OUTPATIENT
Start: 2023-09-08

## 2023-09-08 RX ORDER — LISINOPRIL 40 MG/1
40 TABLET ORAL DAILY
Qty: 90 TABLET | Refills: 0 | Status: SHIPPED | OUTPATIENT
Start: 2023-09-08 | End: 2023-12-10

## 2023-09-11 ENCOUNTER — TRANSFERRED RECORDS (OUTPATIENT)
Dept: HEALTH INFORMATION MANAGEMENT | Facility: CLINIC | Age: 68
End: 2023-09-11
Payer: MEDICARE

## 2023-09-11 LAB — RETINOPATHY: NEGATIVE

## 2023-09-12 ENCOUNTER — LAB (OUTPATIENT)
Dept: LAB | Facility: OTHER | Age: 68
End: 2023-09-12
Payer: MEDICARE

## 2023-09-12 ENCOUNTER — MYC MEDICAL ADVICE (OUTPATIENT)
Dept: FAMILY MEDICINE | Facility: OTHER | Age: 68
End: 2023-09-12

## 2023-09-12 ENCOUNTER — HOSPITAL ENCOUNTER (OUTPATIENT)
Dept: GENERAL RADIOLOGY | Facility: OTHER | Age: 68
Discharge: HOME | End: 2023-09-12
Attending: INTERNAL MEDICINE
Payer: MEDICARE

## 2023-09-12 DIAGNOSIS — N52.9 ERECTILE DYSFUNCTION, UNSPECIFIED ERECTILE DYSFUNCTION TYPE: Primary | ICD-10-CM

## 2023-09-12 DIAGNOSIS — E11.9 DIABETES MELLITUS TYPE 2, DIET-CONTROLLED (H): ICD-10-CM

## 2023-09-12 DIAGNOSIS — D53.9 MACROCYTIC ANEMIA: ICD-10-CM

## 2023-09-12 DIAGNOSIS — Z94.2 LUNG REPLACED BY TRANSPLANT (H): ICD-10-CM

## 2023-09-12 DIAGNOSIS — N52.9 ED (ERECTILE DYSFUNCTION): ICD-10-CM

## 2023-09-12 DIAGNOSIS — Z94.2 LUNG REPLACED BY TRANSPLANT (H): Primary | ICD-10-CM

## 2023-09-12 LAB
ANION GAP SERPL CALCULATED.3IONS-SCNC: 11 MMOL/L (ref 7–15)
BUN SERPL-MCNC: 26.9 MG/DL (ref 8–23)
CALCIUM SERPL-MCNC: 9 MG/DL (ref 8.8–10.2)
CHLORIDE SERPL-SCNC: 106 MMOL/L (ref 98–107)
CREAT SERPL-MCNC: 1.18 MG/DL (ref 0.67–1.17)
DEPRECATED HCO3 PLAS-SCNC: 24 MMOL/L (ref 22–29)
EGFRCR SERPLBLD CKD-EPI 2021: 66 ML/MIN/1.73M2
ERYTHROCYTE [DISTWIDTH] IN BLOOD BY AUTOMATED COUNT: 14.3 % (ref 10–15)
GLUCOSE SERPL-MCNC: 123 MG/DL (ref 70–99)
HCT VFR BLD AUTO: 34 % (ref 40–53)
HGB BLD-MCNC: 11 G/DL (ref 13.3–17.7)
MAGNESIUM SERPL-MCNC: 1.8 MG/DL (ref 1.7–2.3)
MCH RBC QN AUTO: 35.1 PG (ref 26.5–33)
MCHC RBC AUTO-ENTMCNC: 32.4 G/DL (ref 31.5–36.5)
MCV RBC AUTO: 109 FL (ref 78–100)
PLATELET # BLD AUTO: 203 10E3/UL (ref 150–450)
POTASSIUM SERPL-SCNC: 4.7 MMOL/L (ref 3.4–5.3)
RBC # BLD AUTO: 3.13 10E6/UL (ref 4.4–5.9)
SODIUM SERPL-SCNC: 141 MMOL/L (ref 136–145)
WBC # BLD AUTO: 8.3 10E3/UL (ref 4–11)

## 2023-09-12 PROCEDURE — 82310 ASSAY OF CALCIUM: CPT | Mod: ZL

## 2023-09-12 PROCEDURE — 36415 COLL VENOUS BLD VENIPUNCTURE: CPT | Mod: ZL

## 2023-09-12 PROCEDURE — 85045 AUTOMATED RETICULOCYTE COUNT: CPT | Mod: ZL

## 2023-09-12 PROCEDURE — 71046 X-RAY EXAM CHEST 2 VIEWS: CPT

## 2023-09-12 PROCEDURE — 83735 ASSAY OF MAGNESIUM: CPT | Mod: ZL

## 2023-09-12 PROCEDURE — 85027 COMPLETE CBC AUTOMATED: CPT | Mod: ZL
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
## 2023-09-13 NOTE — TELEPHONE ENCOUNTER
Last fill 5/18/23 for 32 tablets and 11 refills  Last OV 6/08/23 with Hoa Olivarez  Last filled by Kirk Broussard and is now asking that it be filed by Hoa Olivarez.    Love Comer RN on 9/13/2023 at 12:35 PM

## 2023-09-14 ENCOUNTER — VIRTUAL VISIT (OUTPATIENT)
Dept: TRANSPLANT | Facility: CLINIC | Age: 68
End: 2023-09-14
Attending: INTERNAL MEDICINE
Payer: MEDICARE

## 2023-09-14 ENCOUNTER — TELEPHONE (OUTPATIENT)
Dept: PULMONOLOGY | Facility: CLINIC | Age: 68
End: 2023-09-14

## 2023-09-14 DIAGNOSIS — Z94.2 LUNG REPLACED BY TRANSPLANT (H): ICD-10-CM

## 2023-09-14 DIAGNOSIS — Z79.899 ENCOUNTER FOR LONG-TERM (CURRENT) USE OF HIGH-RISK MEDICATION: Primary | ICD-10-CM

## 2023-09-14 DIAGNOSIS — D53.9 MACROCYTIC ANEMIA: ICD-10-CM

## 2023-09-14 DIAGNOSIS — N19 RENAL FAILURE, UNSPECIFIED CHRONICITY: ICD-10-CM

## 2023-09-14 LAB
CMV DNA SPEC NAA+PROBE-ACNC: NOT DETECTED IU/ML
RETICS # AUTO: 0.14 10E6/UL (ref 0.03–0.1)
RETICS/RBC NFR AUTO: 4.4 % (ref 0.5–2)

## 2023-09-14 PROCEDURE — 99214 OFFICE O/P EST MOD 30 MIN: CPT | Mod: VID | Performed by: INTERNAL MEDICINE

## 2023-09-14 RX ORDER — SILDENAFIL 25 MG/1
25 TABLET, FILM COATED ORAL PRN
Qty: 32 TABLET | Refills: 11 | Status: ON HOLD | OUTPATIENT
Start: 2023-09-14 | End: 2024-09-04

## 2023-09-14 NOTE — TELEPHONE ENCOUNTER
[facility] Call Center    Phone Message    May a detailed message be left on voicemail: no     Reason for Call: Other: Please contact Mei in Transplant team she would like to connect with PFT lab about double booking PFT labs with 6min walk scheduled on 11/17/23 for patient .      Action Taken: Other: PULM     Travel Screening: Not Applicable

## 2023-09-14 NOTE — PATIENT INSTRUCTIONS
- Get your flu shot and RSV vaccine   - We'll see you back here in 2 months in person       ~~~~~~~~~~~~~~~~~~~~~~~~~    Thoracic Transplant Office phone [phone], fax [phone]    Office Hours 8:30 - 5:00     For after-hours urgent issues, please dial [phone] and ask the  to page the Thoracic Transplant Coordinator On-Call.   --------------------  To expedite your medication refill(s), please contact your pharmacy and have them fax a refill request to: [phone]    *Please allow 3 business days for routine medication refills.  *Please allow 5 business days for controlled substance medication refills.    **For Diabetic medications and supplies refill(s), please contact your pharmacy and have them contact your Endocrine team.  --------------------  For scheduling appointments call [phone].  --------------------  Please Note: If you are active on [employer], all future test results will be sent by [employer] message only, and will no longer be called to patient. You may also receive communication directly from your physician.

## 2023-09-14 NOTE — PROGRESS NOTES
Aubrey is a 68 year old who is being evaluated via a billable video visit.      How would you like to obtain your AVS? Isabella OliverharValeritas  If the video visit is dropped, the invitation should be resent by: Text to cell phone: [phone]  Will anyone else be joining your video visit? No      [phone]  Video-Visit Details    Type of service:  Video Visit   Video Start Time:  9:09  Video End Time: 9:26    Originating Location (pt. Location): Home    Distant Location (provider location):  On-site  Platform used for Video Visit: Damion Walsh on 9/14/2023 at 8:48 AM
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
## 2023-09-14 NOTE — TELEPHONE ENCOUNTER
Spoke to another  who will pass along PFT phone number to Mei to ask about overbooking a 6MW for his next appointment in November.

## 2023-09-16 ENCOUNTER — HEALTH MAINTENANCE LETTER (OUTPATIENT)
Age: 68
End: 2023-09-16

## 2023-09-18 NOTE — TELEPHONE ENCOUNTER
"  Last Prescription Date: 7/13/2022  Last Qty/Refills: 400 / R-1  Last Office Visit: 6/28/2023  Future Office Visit: None     Requested Prescriptions   Pending Prescriptions Disp Refills    Microlet Lancets MISC [Pharmacy Med Name: MICROLET LANCET]  1     Sig: CHECK BLOOD SUGAR FOUR TIMES DAILY E11.9       Diabetic Supplies Protocol Failed - 9/12/2023 10:15 AM        Failed - Recent (6 mo) or future (30 days) visit within the authorizing provider's specialty     Patient had office visit in the last 6 months or has a visit in the next 30 days with authorizing provider.  See \"Patient Info\" tab in inbasket, or \"Choose Columns\" in Meds & Orders section of the refill encounter.         Last Prescription Date: 6/14/2022  Last Qty/Refills: 90 / R-3  Last Office Visit: 6/28/2023  Future Office Visit: None       rosuvastatin (CRESTOR) 40 MG tablet [Pharmacy Med Name: ROSUVASTATIN 40MG TABLET] 90 tablet 3     Sig: TAKE 1/2 TABLET (20 MG) BY MOUTH THREE TIMES A WEEK       Statins Protocol Failed - 9/12/2023 10:15 AM        Failed - Recent (12 mo) or future (30 days) visit within the authorizing provider's specialty     Patient has had an office visit with the authorizing provider or a provider within the authorizing providers department within the previous 12 mos or has a future within next 30 days. See \"Patient Info\" tab in inbasket, or \"Choose Columns\" in Meds & Orders section of the refill encounter.             Cordelia Clifton RN on 9/18/2023 at 10:53 AM  "

## 2023-09-19 RX ORDER — ROSUVASTATIN CALCIUM 40 MG/1
TABLET, COATED ORAL
Qty: 90 TABLET | Refills: 3 | Status: SHIPPED | OUTPATIENT
Start: 2023-09-19 | End: 2024-01-09

## 2023-09-19 RX ORDER — LANCETS
EACH MISCELLANEOUS
Qty: 400 EACH | Refills: 1 | Status: SHIPPED | OUTPATIENT
Start: 2023-09-19 | End: 2024-06-04

## 2023-09-24 DIAGNOSIS — I10 ESSENTIAL HYPERTENSION: Chronic | ICD-10-CM

## 2023-09-25 DIAGNOSIS — E87.5 HYPERKALEMIA: ICD-10-CM

## 2023-09-25 DIAGNOSIS — Z94.2 LUNG REPLACED BY TRANSPLANT (H): ICD-10-CM

## 2023-09-25 RX ORDER — FLUDROCORTISONE ACETATE 0.1 MG/1
0.1 TABLET ORAL DAILY
Qty: 90 TABLET | Refills: 3 | Status: CANCELLED | OUTPATIENT
Start: 2023-09-25

## 2023-09-26 DIAGNOSIS — B25.9 CMV (CYTOMEGALOVIRUS INFECTION) (H): ICD-10-CM

## 2023-09-26 DIAGNOSIS — Z94.2 LUNG REPLACED BY TRANSPLANT (H): ICD-10-CM

## 2023-09-28 ENCOUNTER — ANESTHESIA EVENT (OUTPATIENT)
Dept: SURGERY | Facility: OTHER | Age: 68
End: 2023-09-28
Payer: MEDICARE

## 2023-09-28 ENCOUNTER — ANESTHESIA (OUTPATIENT)
Dept: SURGERY | Facility: OTHER | Age: 68
End: 2023-09-28
Payer: MEDICARE

## 2023-09-28 ENCOUNTER — HOSPITAL ENCOUNTER (OUTPATIENT)
Facility: OTHER | Age: 68
Discharge: HOME | End: 2023-09-28
Attending: SURGERY | Admitting: SURGERY
Payer: MEDICARE

## 2023-09-28 VITALS
RESPIRATION RATE: 16 BRPM | TEMPERATURE: 97.5 F | BODY MASS INDEX: 25.76 KG/M2 | DIASTOLIC BLOOD PRESSURE: 72 MMHG | SYSTOLIC BLOOD PRESSURE: 136 MMHG | HEART RATE: 65 BPM | OXYGEN SATURATION: 98 % | WEIGHT: 170 LBS | HEIGHT: 68 IN

## 2023-09-28 DIAGNOSIS — E87.5 HYPERKALEMIA: ICD-10-CM

## 2023-09-28 DIAGNOSIS — Z94.2 LUNG REPLACED BY TRANSPLANT (H): ICD-10-CM

## 2023-09-28 LAB — GLUCOSE BLDC GLUCOMTR-MCNC: 103 MG/DL (ref 70–99)

## 2023-09-28 PROCEDURE — 45385 COLONOSCOPY W/LESION REMOVAL: CPT | Mod: PT | Performed by: SURGERY

## 2023-09-28 PROCEDURE — 45385 COLONOSCOPY W/LESION REMOVAL: CPT | Performed by: NURSE ANESTHETIST, CERTIFIED REGISTERED

## 2023-09-28 PROCEDURE — 88305 TISSUE EXAM BY PATHOLOGIST: CPT

## 2023-09-28 PROCEDURE — 82962 GLUCOSE BLOOD TEST: CPT | Mod: GZ

## 2023-09-28 PROCEDURE — 250N000009 HC RX 250: Performed by: NURSE ANESTHETIST, CERTIFIED REGISTERED

## 2023-09-28 PROCEDURE — 258N000003 HC RX IP 258 OP 636: Performed by: SURGERY

## 2023-09-28 PROCEDURE — 999N000010 HC STATISTIC ANES STAT CODE-CRNA PER MINUTE: Performed by: SURGERY

## 2023-09-28 PROCEDURE — 250N000011 HC RX IP 250 OP 636: Performed by: NURSE ANESTHETIST, CERTIFIED REGISTERED

## 2023-09-28 PROCEDURE — 45378 DIAGNOSTIC COLONOSCOPY: CPT | Performed by: SURGERY

## 2023-09-28 RX ORDER — LIDOCAINE 40 MG/G
CREAM TOPICAL
Status: DISCONTINUED | OUTPATIENT
Start: 2023-09-28 | End: 2023-09-28 | Stop reason: HOSPADM

## 2023-09-28 RX ORDER — NALOXONE HYDROCHLORIDE 0.4 MG/ML
0.2 INJECTION, SOLUTION INTRAMUSCULAR; INTRAVENOUS; SUBCUTANEOUS
Status: DISCONTINUED | OUTPATIENT
Start: 2023-09-28 | End: 2023-09-28 | Stop reason: HOSPADM

## 2023-09-28 RX ORDER — TOBRAMYCIN 3 MG/ML
SOLUTION/ DROPS OPHTHALMIC
COMMUNITY
Start: 2023-09-11 | End: 2024-01-09

## 2023-09-28 RX ORDER — FLUMAZENIL 0.1 MG/ML
0.2 INJECTION, SOLUTION INTRAVENOUS
Status: DISCONTINUED | OUTPATIENT
Start: 2023-09-28 | End: 2023-09-28 | Stop reason: HOSPADM

## 2023-09-28 RX ORDER — PROPOFOL 10 MG/ML
INJECTION, EMULSION INTRAVENOUS PRN
Status: DISCONTINUED | OUTPATIENT
Start: 2023-09-28 | End: 2023-09-28

## 2023-09-28 RX ORDER — SODIUM CHLORIDE, SODIUM LACTATE, POTASSIUM CHLORIDE, CALCIUM CHLORIDE 600; 310; 30; 20 MG/100ML; MG/100ML; MG/100ML; MG/100ML
INJECTION, SOLUTION INTRAVENOUS CONTINUOUS
Status: DISCONTINUED | OUTPATIENT
Start: 2023-09-28 | End: 2023-09-28 | Stop reason: HOSPADM

## 2023-09-28 RX ORDER — FLUDROCORTISONE ACETATE 0.1 MG/1
0.1 TABLET ORAL DAILY
Qty: 90 TABLET | Refills: 3 | Status: CANCELLED | OUTPATIENT
Start: 2023-09-28

## 2023-09-28 RX ORDER — LIDOCAINE HYDROCHLORIDE 20 MG/ML
INJECTION, SOLUTION INFILTRATION; PERINEURAL PRN
Status: DISCONTINUED | OUTPATIENT
Start: 2023-09-28 | End: 2023-09-28

## 2023-09-28 RX ORDER — NALOXONE HYDROCHLORIDE 0.4 MG/ML
0.4 INJECTION, SOLUTION INTRAMUSCULAR; INTRAVENOUS; SUBCUTANEOUS
Status: DISCONTINUED | OUTPATIENT
Start: 2023-09-28 | End: 2023-09-28 | Stop reason: HOSPADM

## 2023-09-28 RX ORDER — PROPOFOL 10 MG/ML
INJECTION, EMULSION INTRAVENOUS CONTINUOUS PRN
Status: DISCONTINUED | OUTPATIENT
Start: 2023-09-28 | End: 2023-09-28

## 2023-09-28 RX ORDER — NEOMYCIN SULFATE, POLYMYXIN B SULFATE, AND DEXAMETHASONE 3.5; 10000; 1 MG/G; [USP'U]/G; MG/G
OINTMENT OPHTHALMIC
COMMUNITY
Start: 2023-09-14 | End: 2024-01-09

## 2023-09-28 RX ADMIN — LIDOCAINE HYDROCHLORIDE 40 MG: 20 INJECTION, SOLUTION INFILTRATION; PERINEURAL at 10:32

## 2023-09-28 RX ADMIN — PROPOFOL 90 MG: 10 INJECTION, EMULSION INTRAVENOUS at 10:32

## 2023-09-28 RX ADMIN — SODIUM CHLORIDE, POTASSIUM CHLORIDE, SODIUM LACTATE AND CALCIUM CHLORIDE: 600; 310; 30; 20 INJECTION, SOLUTION INTRAVENOUS at 09:15

## 2023-09-28 RX ADMIN — PROPOFOL 150 MCG/KG/MIN: 10 INJECTION, EMULSION INTRAVENOUS at 10:32

## 2023-09-28 ASSESSMENT — ACTIVITIES OF DAILY LIVING (ADL)
ADLS_ACUITY_SCORE: 35

## 2023-09-28 ASSESSMENT — COPD QUESTIONNAIRES: COPD: 1

## 2023-09-28 ASSESSMENT — LIFESTYLE VARIABLES: TOBACCO_USE: 1

## 2023-09-28 NOTE — ANESTHESIA POSTPROCEDURE EVALUATION
Patient: Aubrey Duncan    Procedure: Procedure(s):  Colonoscopy with polypectomy       Anesthesia Type:  MAC    Note:  Disposition: Outpatient   Postop Pain Control: Uneventful            Sign Out: Well controlled pain   PONV: No   Neuro/Psych: Uneventful            Sign Out: Acceptable/Baseline neuro status   Airway/Respiratory: Uneventful            Sign Out: Acceptable/Baseline resp. status   CV/Hemodynamics: Uneventful            Sign Out: Acceptable CV status; No obvious hypovolemia; No obvious fluid overload   Other NRE: NONE   DID A NON-ROUTINE EVENT OCCUR? No           Last vitals:  Vitals Value Taken Time   /55 09/28/23 1120   Temp 97.5  F (36.4  C) 09/28/23 1120   Pulse 64 09/28/23 1120   Resp 16 09/28/23 1120   SpO2 98 % 09/28/23 1120       Electronically Signed By: BRANDI LAY CRNA  September 28, 2023  12:24 PM

## 2023-09-28 NOTE — OP NOTE
PROCEDURE NOTE    SURGEON:Quique Olivarez MD    PRE-OP DIAGNOSIS:  Screening Colonoscopy      POST-OP DIAGNOSIS: Colon polyps    PROCEDURE:  Colonoscopy with cold snare    SPECIMEN:      ID Type Source Tests Collected by Time Destination   1 : transverse colon polyps Polyp Large Intestine, Colon, Transverse SURGICAL PATHOLOGY EXAM Quique Olivarez MD 9/28/2023 11:03 AM        ANESTHESIA:  MAC CRNA Independent: Jaydon Parr APRN CRNA   Coverage requested due to ASA III    ESTIMATED BLOOD LOSS: none    COMPLICATIONS:  None    INDICATION FOR THE PROCEDURE: The patient is a 68 year old male. The patient presents with hx of polyps. I explained to the patient the risks, benefits and alternatives to screening colonoscopy for evaluating for cancer or polyps. We discussed the risks including bleeding, perforation, potential inability to reach the cecum and the risks of sedation. The patient's questions were answered and the patient wished to proceed. Informed consent paperwork was completed.    PROCEDURE: The patient was taken to the endoscopy suite. Appropriate monitors were attached. The patient was placed in the left lateral decubitus position. Timeout was performed confirming the patient's identity and procedure to be performed.  After appropriate sedation was confirmed, digital rectal exam was performed.  There was normal tone and no gross abnormality was noted.  The lubricated colonoscope was introduced into the anus the colon was insufflated with air. The prep quality was adequate. Under direct visualization the scope was advanced to the cecum. The mucosa of the colon was inspected while withdrawing the scope. 2 transverse colon polyps were removed with cold snare. ( 4-5 mm) The scope was retroflexed in the rectum and the anorectal junction was inspected. No abnormalities were noted. The scope was returned to a neutral position and the colon was decompressed. The scope was removed. The patient tolerated the  procedure with no immediately apparent complication. The patient was taken to recovery in stable condition.    FOLLOW UP: RECOMMEND high fiber diet, will call with pathology results.    Quique Olivarez MD on 9/28/2023 at 11:13 AM
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
## 2023-09-28 NOTE — DISCHARGE INSTRUCTIONS
[facility]  Adult Discharge Orders & Instructions      Get plenty of rest.  A responsible adult must stay with you for at least 12 hours after you leave the hospital.   You may feel lightheaded.  IF so, sit for a few minutes before standing.  Have someone help you get up.   You may have a slight fever. Call the doctor if your fever is over 101 F (38.3 C) (taken under the tongue) or lasts longer than 24 hours.  You may have a dry mouth, a sore throat, muscle aches or trouble sleeping.  These should go away after 24 hours.  Do not make important or legal decisions.  6.   Do not drive or use heavy equipment.  If you have weakness or tingling, don't drive or use heavy equipment until this feeling goes away.    To contact a doctor, call   [phone]
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
## 2023-10-02 ENCOUNTER — MEDICAL CORRESPONDENCE (OUTPATIENT)
Dept: HEALTH INFORMATION MANAGEMENT | Facility: CLINIC | Age: 68
End: 2023-10-02
Payer: MEDICARE

## 2023-10-02 ENCOUNTER — TRANSFERRED RECORDS (OUTPATIENT)
Dept: HEALTH INFORMATION MANAGEMENT | Facility: CLINIC | Age: 68
End: 2023-10-02
Payer: MEDICARE

## 2023-10-02 DIAGNOSIS — Z94.2 LUNG REPLACED BY TRANSPLANT (H): ICD-10-CM

## 2023-10-02 DIAGNOSIS — E87.5 HYPERKALEMIA: ICD-10-CM

## 2023-10-02 LAB
PATH REPORT.COMMENTS IMP SPEC: NORMAL
PATH REPORT.FINAL DX SPEC: NORMAL
PATH REPORT.RELEVANT HX SPEC: NORMAL
PHOTO IMAGE: NORMAL
RETINOPATHY: NEGATIVE

## 2023-10-02 RX ORDER — DAPSONE 25 MG/1
50 TABLET ORAL DAILY
Qty: 180 TABLET | Refills: 3 | Status: CANCELLED | OUTPATIENT
Start: 2023-10-02

## 2023-10-03 ENCOUNTER — TRANSCRIBE ORDERS (OUTPATIENT)
Dept: OTHER | Age: 68
End: 2023-10-03

## 2023-10-03 DIAGNOSIS — L57.0 ACTINIC KERATOSIS: Primary | ICD-10-CM

## 2023-10-03 RX ORDER — CARVEDILOL 6.25 MG/1
6.25 TABLET ORAL 2 TIMES DAILY WITH MEALS
Qty: 120 TABLET | Refills: 0 | Status: SHIPPED | OUTPATIENT
Start: 2023-10-03 | End: 2023-11-21

## 2023-10-09 DIAGNOSIS — E87.5 HYPERKALEMIA: ICD-10-CM

## 2023-10-09 DIAGNOSIS — Z94.2 LUNG REPLACED BY TRANSPLANT (H): ICD-10-CM

## 2023-10-09 RX ORDER — DAPSONE 25 MG/1
50 TABLET ORAL DAILY
Qty: 180 TABLET | Refills: 1 | OUTPATIENT
Start: 2023-10-09

## 2023-10-09 RX ORDER — FLUDROCORTISONE ACETATE 0.1 MG/1
0.1 TABLET ORAL DAILY
Qty: 90 TABLET | Refills: 3 | OUTPATIENT
Start: 2023-10-09

## 2023-10-09 RX ORDER — FLUDROCORTISONE ACETATE 0.1 MG/1
0.1 TABLET ORAL DAILY
Qty: 90 TABLET | Refills: 3 | Status: SHIPPED | OUTPATIENT
Start: 2023-10-09 | End: 2024-07-10

## 2023-10-09 NOTE — TELEPHONE ENCOUNTER
Medication is managed by Dr. Willis and transplant team. BRANDI Manning CNP on 10/9/2023 at 3:31 PM

## 2023-10-09 NOTE — TELEPHONE ENCOUNTER
[id number] [place] sent Rx request for the following:    dapsone (ACZONE) 25 MG tablet [Pharmacy Med Name: DAPSONE 25MG TABLET] 180 tablet 3     Sig: TAKE 2 TABLETS (50 MG) BY MOUTH DAILY   Last Prescription Date:   1/10/23  Last Fill Qty/Refills:         180, R-3      There is no refill protocol information for this order        01/12/23 1710 Verbal Cosign Alma Murphy MD     Last Office Visit:              6/28/23   Future Office visit:           None    Unable to complete prescription refill per RN Medication Refill Policy.     Brooke Nicholas RN .............. 10/9/2023  3:28 PM

## 2023-10-09 NOTE — TELEPHONE ENCOUNTER
FUTURE VISIT INFORMATION      FUTURE VISIT INFORMATION:  Date: 1/2/23  Time: 9:00am  Location: [facility]  REFERRAL INFORMATION:  Referring provider:   Jude Smith   Referring providers clinic:  [facility]   Reason for visit/diagnosis  non healing, lateral canthus/lid margin lesion LLL,  RECORDS REQUESTED FROM:       Clinic name Comments Records Status Imaging Status   [facility]  Recs scnned into chart under 10/2/23 EPIC

## 2023-10-09 NOTE — TELEPHONE ENCOUNTER
[id number] ([facility] [place] sent Rx request for the following:      Requested Prescriptions   Pending Prescriptions Disp Refills    fludrocortisone (FLORINEF) 0.1 MG tablet [Pharmacy Med Name: FLUDROCORTISONE 0.1MG TABLET] 90 tablet 3     Sig: TAKE 1 TABLET (0.1 MG) BY MOUTH DAILY    Approved 10/9/23. Pharmacy notified. Brooke Nicholas RN .............. 10/9/2023  3:25 PM

## 2023-10-09 NOTE — TELEPHONE ENCOUNTER
"Called [facility] and spoke with Galina, after verifying Pt's last name and . She was informed of provider response. She states, \"everything is taken care of.\"    Brooke Nicholas RN .............. 10/9/2023  3:49 PM    "

## 2023-10-10 DIAGNOSIS — E87.5 HYPERKALEMIA: ICD-10-CM

## 2023-10-10 DIAGNOSIS — Z94.2 LUNG REPLACED BY TRANSPLANT (H): ICD-10-CM

## 2023-10-10 RX ORDER — FLUDROCORTISONE ACETATE 0.1 MG/1
0.1 TABLET ORAL DAILY
Qty: 90 TABLET | Refills: 3 | Status: CANCELLED | OUTPATIENT
Start: 2023-10-10

## 2023-10-16 DIAGNOSIS — B25.9 CMV (CYTOMEGALOVIRUS INFECTION) (H): ICD-10-CM

## 2023-10-16 DIAGNOSIS — Z94.2 LUNG REPLACED BY TRANSPLANT (H): ICD-10-CM

## 2023-10-16 RX ORDER — VALGANCICLOVIR 450 MG/1
TABLET, FILM COATED ORAL
Qty: 60 TABLET | Refills: 11 | Status: CANCELLED | OUTPATIENT
Start: 2023-10-16

## 2023-10-16 NOTE — TELEPHONE ENCOUNTER
CARVEDILOL 6.25MG TABLET   Requested Prescriptions   Pending Prescriptions Disp Refills    carvedilol (COREG) 6.25 MG tablet [Pharmacy Med Name: CARVEDILOL 6.25MG TABLET] 120 tablet 0     Sig: TAKE 1 TABLET (6.25 MG) BY MOUTH 2 TIMES DAILY (WITH MEALS)       Beta-Blockers Protocol Failed - 9/24/2023  1:20 PM        Failed - Blood pressure under 140/90 in past 12 months     BP Readings from Last 3 Encounters:   09/28/23 136/72   09/07/23 (!) 170/78   08/21/23 (!) 150/78         CARVEDILOL 6.25MG TABLET         Last Written Prescription Date:  7/20/23  Last Fill Quantity: 12,   # refills: 0  Last Office Visit: 6/28/23.  Future Office visit:       Routing refill request to provider for review/approval because:  Drug failed the FM, P or [facility] refill protocol. Unable to complete prescription refill per RNMedication Refill Policy.................... Nery Zaragoza RN ....................  10/2/2023   12:17 PM           Detail Level: Zone Detail Level: Generalized Detail Level: Detailed

## 2023-10-17 DIAGNOSIS — E11.49 TYPE II OR UNSPECIFIED TYPE DIABETES MELLITUS WITH NEUROLOGICAL MANIFESTATIONS, NOT STATED AS UNCONTROLLED(250.60) (H): ICD-10-CM

## 2023-10-17 DIAGNOSIS — E11.51 DIABETES MELLITUS WITH PERIPHERAL VASCULAR DISEASE (H): ICD-10-CM

## 2023-10-17 DIAGNOSIS — R23.4 SKIN FISSURE: ICD-10-CM

## 2023-10-17 DIAGNOSIS — B35.3 TINEA PEDIS OF BOTH FEET: ICD-10-CM

## 2023-10-17 RX ORDER — ECONAZOLE NITRATE 10 MG/G
CREAM TOPICAL DAILY
Qty: 85 G | Refills: 3 | Status: SHIPPED | OUTPATIENT
Start: 2023-10-17

## 2023-10-20 ENCOUNTER — TRANSFERRED RECORDS (OUTPATIENT)
Dept: FAMILY MEDICINE | Facility: OTHER | Age: 68
End: 2023-10-20
Payer: MEDICARE

## 2023-10-20 VITALS — DIASTOLIC BLOOD PRESSURE: 72 MMHG | SYSTOLIC BLOOD PRESSURE: 136 MMHG

## 2023-10-20 NOTE — PROGRESS NOTES
Patient Quality Outreach    Patient is due for the following:   Hypertension -  BP check    Next Steps:   No follow up needed at this time.    Type of outreach:    Chart review performed, no outreach needed. Patient's BP was taken during colonoscopy on 9/28/2023 with a reading of 136/72. Flowsheets updated.,    Questions for provider review:    None           Jenna Aguilar RN

## 2023-10-26 DIAGNOSIS — Z94.2 LUNG TRANSPLANT STATUS, BILATERAL (H): ICD-10-CM

## 2023-10-26 RX ORDER — PREDNISONE 5 MG/1
TABLET ORAL
Qty: 45 TABLET | Refills: 12 | Status: SHIPPED | OUTPATIENT
Start: 2023-10-26 | End: 2024-08-25

## 2023-10-30 ENCOUNTER — HOSPITAL ENCOUNTER (OUTPATIENT)
Dept: ULTRASOUND IMAGING | Facility: OTHER | Age: 68
Discharge: HOME | End: 2023-10-30
Attending: NURSE PRACTITIONER | Admitting: NURSE PRACTITIONER
Payer: MEDICARE

## 2023-10-30 DIAGNOSIS — I73.9 PAD (PERIPHERAL ARTERY DISEASE) (H): ICD-10-CM

## 2023-10-30 PROCEDURE — 93922 UPR/L XTREMITY ART 2 LEVELS: CPT

## 2023-10-31 ENCOUNTER — HOSPITAL ENCOUNTER (OUTPATIENT)
Dept: ULTRASOUND IMAGING | Facility: OTHER | Age: 68
Discharge: HOME | End: 2023-10-31
Attending: NURSE PRACTITIONER | Admitting: NURSE PRACTITIONER
Payer: MEDICARE

## 2023-10-31 DIAGNOSIS — I73.9 PAD (PERIPHERAL ARTERY DISEASE) (H): ICD-10-CM

## 2023-10-31 PROCEDURE — 93925 LOWER EXTREMITY STUDY: CPT

## 2023-11-17 ENCOUNTER — OFFICE VISIT (OUTPATIENT)
Dept: TRANSPLANT | Facility: CLINIC | Age: 68
End: 2023-11-17
Attending: INTERNAL MEDICINE
Payer: MEDICARE

## 2023-11-17 ENCOUNTER — OFFICE VISIT (OUTPATIENT)
Dept: VASCULAR SURGERY | Facility: CLINIC | Age: 68
End: 2023-11-17
Payer: MEDICARE

## 2023-11-17 ENCOUNTER — OFFICE VISIT (OUTPATIENT)
Dept: PULMONOLOGY | Facility: CLINIC | Age: 68
End: 2023-11-17
Payer: MEDICARE

## 2023-11-17 ENCOUNTER — OFFICE VISIT (OUTPATIENT)
Dept: PULMONOLOGY | Facility: CLINIC | Age: 68
End: 2023-11-17
Attending: INTERNAL MEDICINE
Payer: MEDICARE

## 2023-11-17 ENCOUNTER — ANCILLARY PROCEDURE (OUTPATIENT)
Dept: GENERAL RADIOLOGY | Facility: CLINIC | Age: 68
End: 2023-11-17
Attending: INTERNAL MEDICINE
Payer: MEDICARE

## 2023-11-17 ENCOUNTER — LAB (OUTPATIENT)
Dept: LAB | Facility: CLINIC | Age: 68
End: 2023-11-17
Attending: INTERNAL MEDICINE
Payer: MEDICARE

## 2023-11-17 VITALS — HEART RATE: 86 BPM | DIASTOLIC BLOOD PRESSURE: 71 MMHG | SYSTOLIC BLOOD PRESSURE: 119 MMHG | OXYGEN SATURATION: 95 %

## 2023-11-17 VITALS
BODY MASS INDEX: 26.62 KG/M2 | DIASTOLIC BLOOD PRESSURE: 71 MMHG | HEART RATE: 86 BPM | WEIGHT: 175.1 LBS | OXYGEN SATURATION: 95 % | SYSTOLIC BLOOD PRESSURE: 119 MMHG

## 2023-11-17 DIAGNOSIS — Z79.899 ENCOUNTER FOR LONG-TERM (CURRENT) USE OF HIGH-RISK MEDICATION: ICD-10-CM

## 2023-11-17 DIAGNOSIS — D53.9 MACROCYTIC ANEMIA: ICD-10-CM

## 2023-11-17 DIAGNOSIS — Z94.2 LUNG REPLACED BY TRANSPLANT (H): ICD-10-CM

## 2023-11-17 DIAGNOSIS — I70.219 INTERMITTENT CLAUDICATION DUE TO ATHEROSCLEROSIS OF ARTERY OF EXTREMITY (H): ICD-10-CM

## 2023-11-17 DIAGNOSIS — N19 RENAL FAILURE, UNSPECIFIED CHRONICITY: ICD-10-CM

## 2023-11-17 DIAGNOSIS — I73.9 PAD (PERIPHERAL ARTERY DISEASE) (H): Primary | ICD-10-CM

## 2023-11-17 LAB
6 MIN WALK (FT): 600 FT
6 MIN WALK (M): 183 M
ANION GAP SERPL CALCULATED.3IONS-SCNC: 9 MMOL/L (ref 7–15)
BUN SERPL-MCNC: 26.1 MG/DL (ref 8–23)
CALCIUM SERPL-MCNC: 8.8 MG/DL (ref 8.8–10.2)
CHLORIDE SERPL-SCNC: 106 MMOL/L (ref 98–107)
CMV DNA SPEC NAA+PROBE-ACNC: NOT DETECTED IU/ML
CREAT SERPL-MCNC: 1.06 MG/DL (ref 0.67–1.17)
DEPRECATED HCO3 PLAS-SCNC: 26 MMOL/L (ref 22–29)
DLCOCOR-%PRED-PRE: 79 %
DLCOCOR-PRE: 19.38 ML/MIN/MMHG
DLCOUNC-%PRED-PRE: 70 %
DLCOUNC-PRE: 17.22 ML/MIN/MMHG
DLCOUNC-PRED: 24.36 ML/MIN/MMHG
EGFRCR SERPLBLD CKD-EPI 2021: 75 ML/MIN/1.73M2
ERV-%PRED-PRE: 44 %
ERV-PRE: 0.6 L
ERV-PRED: 1.35 L
ERYTHROCYTE [DISTWIDTH] IN BLOOD BY AUTOMATED COUNT: 14.5 % (ref 10–15)
EXPTIME-PRE: 6.41 SEC
FEF2575-%PRED-PRE: 133 %
FEF2575-PRE: 2.94 L/SEC
FEF2575-PRED: 2.2 L/SEC
FEFMAX-%PRED-PRE: 129 %
FEFMAX-PRE: 10.23 L/SEC
FEFMAX-PRED: 7.88 L/SEC
FEV1-%PRED-PRE: 104 %
FEV1-PRE: 2.93 L
FEV1FEV6-PRE: 80 %
FEV1FEV6-PRED: 78 %
FEV1FVC-PRE: 80 %
FEV1FVC-PRED: 77 %
FEV1SVC-PRE: 77 %
FEV1SVC-PRED: 70 %
FIFMAX-PRE: 7.64 L/SEC
FRCPLETH-%PRED-PRE: 65 %
FRCPLETH-PRE: 2.37 L
FRCPLETH-PRED: 3.59 L
FVC-%PRED-PRE: 100 %
FVC-PRE: 3.67 L
FVC-PRED: 3.64 L
GLUCOSE SERPL-MCNC: 107 MG/DL (ref 70–99)
HCT VFR BLD AUTO: 35 % (ref 40–53)
HCT VFR BLD AUTO: 35 % (ref 40–53)
HGB BLD-MCNC: 11.2 G/DL (ref 13.3–17.7)
IC-%PRED-PRE: 118 %
IC-PRE: 3.18 L
IC-PRED: 2.69 L
IGG SERPL-MCNC: 748 MG/DL (ref 610–1616)
MAGNESIUM SERPL-MCNC: 1.8 MG/DL (ref 1.7–2.3)
MCH RBC QN AUTO: 34.6 PG (ref 26.5–33)
MCHC RBC AUTO-ENTMCNC: 32 G/DL (ref 31.5–36.5)
MCV RBC AUTO: 108 FL (ref 78–100)
PLATELET # BLD AUTO: 174 10E3/UL (ref 150–450)
POTASSIUM SERPL-SCNC: 3.9 MMOL/L (ref 3.4–5.3)
RBC # BLD AUTO: 3.24 10E6/UL (ref 4.4–5.9)
RVPLETH-%PRED-PRE: 68 %
RVPLETH-PRE: 1.77 L
RVPLETH-PRED: 2.58 L
SODIUM SERPL-SCNC: 141 MMOL/L (ref 135–145)
TACROLIMUS BLD-MCNC: 7.7 UG/L (ref 5–15)
TLCPLETH-%PRED-PRE: 82 %
TLCPLETH-PRE: 5.55 L
TLCPLETH-PRED: 6.72 L
TME LAST DOSE: NORMAL H
TME LAST DOSE: NORMAL H
VA-%PRED-PRE: 84 %
VA-PRE: 5.07 L
VC-%PRED-PRE: 95 %
VC-PRE: 3.78 L
VC-PRED: 3.98 L
VIT B12 SERPL-MCNC: 653 PG/ML (ref 232–1245)
VIT D+METAB SERPL-MCNC: 32 NG/ML (ref 20–50)
WBC # BLD AUTO: 8.2 10E3/UL (ref 4–11)

## 2023-11-17 PROCEDURE — 99000 SPECIMEN HANDLING OFFICE-LAB: CPT | Performed by: PATHOLOGY

## 2023-11-17 PROCEDURE — 93000 ELECTROCARDIOGRAM COMPLETE: CPT | Performed by: INTERNAL MEDICINE

## 2023-11-17 PROCEDURE — 86832 HLA CLASS I HIGH DEFIN QUAL: CPT | Performed by: INTERNAL MEDICINE

## 2023-11-17 PROCEDURE — 86833 HLA CLASS II HIGH DEFIN QUAL: CPT | Performed by: INTERNAL MEDICINE

## 2023-11-17 PROCEDURE — 36415 COLL VENOUS BLD VENIPUNCTURE: CPT | Mod: ZL

## 2023-11-17 PROCEDURE — 82784 ASSAY IGA/IGD/IGG/IGM EACH: CPT | Performed by: INTERNAL MEDICINE

## 2023-11-17 PROCEDURE — 82747 ASSAY OF FOLIC ACID RBC: CPT | Mod: 90 | Performed by: PATHOLOGY

## 2023-11-17 PROCEDURE — 99214 OFFICE O/P EST MOD 30 MIN: CPT | Mod: 25 | Performed by: INTERNAL MEDICINE

## 2023-11-17 PROCEDURE — 82525 ASSAY OF COPPER: CPT | Mod: 90 | Performed by: PATHOLOGY

## 2023-11-17 PROCEDURE — 80048 BASIC METABOLIC PNL TOTAL CA: CPT | Performed by: PATHOLOGY

## 2023-11-17 PROCEDURE — 94618 PULMONARY STRESS TESTING: CPT | Performed by: INTERNAL MEDICINE

## 2023-11-17 PROCEDURE — 94375 RESPIRATORY FLOW VOLUME LOOP: CPT | Performed by: INTERNAL MEDICINE

## 2023-11-17 PROCEDURE — 94726 PLETHYSMOGRAPHY LUNG VOLUMES: CPT | Performed by: INTERNAL MEDICINE

## 2023-11-17 PROCEDURE — G0463 HOSPITAL OUTPT CLINIC VISIT: HCPCS | Performed by: INTERNAL MEDICINE

## 2023-11-17 PROCEDURE — 82306 VITAMIN D 25 HYDROXY: CPT | Performed by: INTERNAL MEDICINE

## 2023-11-17 PROCEDURE — 99214 OFFICE O/P EST MOD 30 MIN: CPT | Performed by: HOSPITALIST

## 2023-11-17 PROCEDURE — 80197 ASSAY OF TACROLIMUS: CPT | Performed by: INTERNAL MEDICINE

## 2023-11-17 PROCEDURE — 71046 X-RAY EXAM CHEST 2 VIEWS: CPT | Mod: GC | Performed by: RADIOLOGY

## 2023-11-17 PROCEDURE — 94729 DIFFUSING CAPACITY: CPT | Performed by: INTERNAL MEDICINE

## 2023-11-17 PROCEDURE — 83735 ASSAY OF MAGNESIUM: CPT | Performed by: PATHOLOGY

## 2023-11-17 PROCEDURE — 82607 VITAMIN B-12: CPT | Performed by: INTERNAL MEDICINE

## 2023-11-17 PROCEDURE — 87799 DETECT AGENT NOS DNA QUANT: CPT | Performed by: INTERNAL MEDICINE

## 2023-11-17 PROCEDURE — 85027 COMPLETE CBC AUTOMATED: CPT | Performed by: PATHOLOGY

## 2023-11-17 ASSESSMENT — PAIN SCALES - GENERAL: PAINLEVEL: NO PAIN (0)

## 2023-11-17 NOTE — PROGRESS NOTES
Aubrey Duncan comes into clinic today at the request of Dr. Alma Murphy Ordering Provider for PFT        This service provided today was under the supervising provider of the day Dr. Alma Murphy, who was available if needed.    Shaheed Mohr, RRT
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
## 2023-11-17 NOTE — NURSING NOTE
Chief Complaint   Patient presents with    New Patient     The patient reports pain with bilateral lower legs that he describes as burning. He also reports numbness in the right leg more than the left. His feet are both sore often. The patient is curious about working with a podiatrist with these issues to find solutions.     Vitals were taken and medications reconciled.    Mino Lucas, EMT  2:47 PM
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
## 2023-11-17 NOTE — PATIENT INSTRUCTIONS
Thank you so much for choosing us for your care. It was a pleasure to see you at the vascular clinic today.     Follow-up recommendations: We will see you back in 4 months. Please do not hesitate to reach out to us in the meantime with any concerns. Our schedulers will get in touch with you to set up your follow-up visit. We will refer you to a supervised walking program.    Additional testing/imaging ordered today: Repeat ABIs in 4 months      Our scheduling team will get in touch with you to set up any follow-up testing/imaging and/or appointments. Please be aware that any testing/imaging recommended today will need to completed prior to your next visit with the provider. If testing/imaging is not completed prior to your next visit, your visit may be rescheduled.        If you have any questions, please contact our clinic directly at [phone] and ask for the nurse. We also encourage the use of [employer] to communicate with your HealthCare Provider.      If you have an urgent need after business hours (8:00 am to 4:30 pm) please call [phone], option 4, and ask for the vascular attending on call. For non-urgent after hours needs, please call the vascular clinic at [phone]. For scheduling needs, please call our clinic directly at [phone].

## 2023-11-17 NOTE — LETTER
11/17/2023       RE: Aubrey Duncan  [address]  [address] [place] [id number]     Dear Colleague,    Thank you for referring your patient, Aubrey Duncan, to the [facility]. Please see a copy of my visit note below.    VASCULAR MEDICINE CONSULT NOTE          LOCATION:  [facility]- CLINICS AND SURGERY CENTER      Date of Service: 11/17/2023      Primary Care Provider: Hoa Olivarez  Referring provider;  No ref. provider found      Reason for the visit/chief complaint:   PAD      HPI:  Aubrey Duncan is a pleasant 68 year old male who presents to our Vascular Medicine clinic accompanied by his spouse Kyung  with the above mentioned complaint.    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    For his peripheral arterial disease, he was previously following up with Dr. Boyd from vascular surgery last seen February 2023.  His disease appears to have come to medical attention around the time he had the heatstroke in June 202.  At that time, he did complain of right leg pain. Arterial duplex ultrasound and subsequent CTA showed significant PAD with multiple areas of arterial occlusion including heavily  calcified tibial vessels and concern for occlusion of his below-knee popliteal artery. This started to cause him significant life disabling claudication of the right lower extremity that was unresponsive to walking program and conservative management.  Subsequently on 8/16/2022, the patient underwent right lower extremity arteriogram with Dr. Boyd with attempted intervention however, it was found that he has unreconstructable PAD below the knee with multiple collaterals.  Medical management was recommended.  He has since followed up with vascular surgery for conservative management.  He was last seen by Dr. Boyd in February 2023 and by Chuyita Hernández through video visit in August 2023.  He was referred to our clinic to continue medical management for PAD.      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 This was followed by lower extremity venous ultrasound that showed right tibial and both peroneal veins DVT.  He was started on apixaban at that time.  He subsequently was seen by Dr. Cogan from the center for bleeding and clotting disorders here at the [facility] when apixaban was unaffordable to evaluate the need for ongoing anticoagulation.  He was seen in May 2023 and at that time apixaban was discontinued due to cost and he was switched to dual antiplatelet therapy with aspirin and clopidogrel.  Repeat lower extremity ultrasound showed no evidence of DVT and no indication for ongoing anticoagulation was felt necessary.      Today,  Mr. Duncan reports his right lower extremity pain continues to be stable but continues to have short distance claudication.  Denies rest pain or lower extremity wounds.  Interestingly, per reviewing his chart it does seem that he was previously on rivaroxaban 2.5 mg  twice daily and aspirin 81 mg per the Compass trial.  It does seem that he struggled financially with rivaroxaban for which this was stopped at some point.  He currently continues on dual antiplatelet therapy with aspirin and clopidogrel.          REVIEW OF SYSTEMS:    A 12 point ROS was reviewed and is negative except what is mentioned in HPI.       Past medical history, surgical history, medications, family history, social history and allergies were reviewed. Pertinent points mentioned under HPI.        OBJECTIVE:    Vital signs:  /71   Pulse 86   SpO2 95%   Wt Readings from Last 1 Encounters:   11/17/23 175 lb 1.6 oz (79.4 kg)     There is no height or weight on file to calculate BMI.    Physical exam:  General appearance: Pleasant male in no apparent distress.    HEENT: NC/AT.    Neck: Carotids +2/2 bilaterally without bruits.  No jugular venous distension.   Heart: RRR. Normal S1, S2.   Chest: Clear to auscultation bilaterally.  Abdomen: Soft, nontender, nondistended. No pulsatile mass.  No bruits.   Extremities: Was wearing 10 mmHg compression stockings.  Upon removal, there was pitting pedal edema noted more to the right lower extremity.  I was unable to feel his pulses of bilateral lower extremities on exam.  Feet pulses however were dopplerable with monophasic waveform on the right lower extremity DP and PT and what appears to be multiphasic on the left.  Skin: No skin wounds.  Neurological: Alert, awake and oriented           DIAGNOSTIC STUDIES:   Labs and diagnostics reviewed including outside records. Pertinent points are mentioned under HPI and assessment and plan sections.          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 Duncan's previous medical history as detailed above.  For medical management for his PAD, we discussed optimizing his medications as well as walking program.  The patient does not do significant walking by himself although tries his best.  I suggested supervised walking program and he was intrigued.  We discussed the evidence behind walking program and he was agreeable to starting the program.    Regards to his medications, unfortunately he currently cannot afford direct oral coagulants including rivaroxaban 2.5 mg twice daily per his report.  Whether he needs to be on dual antiplatelet therapy with aspirin 81 mg and clopidogrel 75 mg, its clear with no great evidence that this might be more helpful.  However, for now we agreed to continue the current therapy and we will reevaluate.    He is on rosuvastatin 40 mg and LDL is at goal 51.  He will continue with that.    Blood pressure under good control.    Recommendations:  Referral to supervised walking program.  Continue DAPT for now.  Continue rosuvastatin 40 mg.  LDL at goal 51.  Follow-up in 3 months with repeat TC with exercise prior to the visit.    It was a pleasure meeting with Mr. Duncan and his wife in our clinic today.        Again, thank you for allowing me to participate in the care of your patient.      Sincerely,    Derick Carballo MD

## 2023-11-17 NOTE — PATIENT INSTRUCTIONS
- Trial stopping the evening prilosec, you may have some heartburn when you stop, wait about a week but if continuing to worsen during the week and not improve you may resume    - Stop the valcyte after hunting season and then let us know    - Check your multivitamin box and let us know if there's folate in it and let us know how much     ~~~~~~~~~~~~~~~~~~~~~~~~~    Thoracic Transplant Office phone [phone], fax [phone]    Office Hours 8:30 - 5:00     For after-hours urgent issues, please dial [phone] and ask the  to page the Thoracic Transplant Coordinator On-Call.   --------------------  To expedite your medication refill(s), please contact your pharmacy and have them fax a refill request to: [phone]    *Please allow 3 business days for routine medication refills.  *Please allow 5 business days for controlled substance medication refills.    **For Diabetic medications and supplies refill(s), please contact your pharmacy and have them contact your Endocrine team.  --------------------  For scheduling appointments call [phone].  --------------------  Please Note: If you are active on [employer], all future test results will be sent by [employer] message only, and will no longer be called to patient. You may also receive communication directly from your physician.

## 2023-11-17 NOTE — PROGRESS NOTES
Aubrey Duncan comes into clinic today at the request of Dr. Alma Murphy Ordering Provider for 6 minute walk      This service provided today was under the supervising provider of the day Dr. Alma Murphy, who was available if needed.    Shaheed Mohr, RRT

## 2023-11-17 NOTE — NURSING NOTE
Transplant Coordinator Note    Reason for visit: Post lung transplant follow up visit   Coordinator: Present (Luz in person)  Caregiver:  Pt's wife    Health concerns addressed today:  1. Respiratory: no shortness of breath; sinus drainage - not bad, still an issue (has to clear throat); will start using a humidifier at home  2. GI/: appetite okay; imodium x1 - BMs once per day  3. Activity: deer hunting; using a treadmill 30 minutes per day; leg burning and aching  4. Following with vascular     Activity/rehab: Up ad aviva  Oxygen needs: Room air  Pain management/RX: PRN tylenol for neuropathy and achy legs  Diabetic management: Managed by PCP  PJP prophylactic: dapsone    COVID:  COVID-19 infection (yes/no, date of most recent positive test):   Status/instructions given about COVID-19 vaccine:     Pt Education: medications (use/dose/side effects), how/when to call coordinator, frequency of labs, s/s of infection/rejection, call prior to starting any new medications, lab/vital sign book    Health Maintenance:   Last colonoscopy: 9/28/23  Next colonoscopy due: 2028  Dermatology: following  Vaccinations this visit:   Dexa scan: 11/17/22    Labs, CXR, PFTs reviewed with patient  Medication record reviewed and reconciled  Questions and concerns addressed    Patient Instructions  1. Continue to hydrate with 70 oz fluids daily.  2. Continue to exercise daily or most days of the week.  3. Follow up with your primary care provider for annual gender health maintenance procedures.  4. Follow up with colonoscopy schedule.  5. Follow up with annual dermatology visits.  6. Keep up the great work!  7. Plan to stop valcyte soon.  Let Luz know when you stop taking it and we'll check your CMV level once every 2 weeks.    Next transplant clinic appointment:  6 months with CXR, labs and PFTs  Next lab draw: pending tacrolimus level, otherwise every 6 weeks    AVS printed at time of check out

## 2023-11-18 LAB — FOLATE RBC-MCNC: 1219 NG/ML

## 2023-11-19 LAB — COPPER SERPL-MCNC: 65.1 UG/DL

## 2023-11-20 DIAGNOSIS — I10 ESSENTIAL HYPERTENSION: Chronic | ICD-10-CM

## 2023-11-20 LAB
EBV DNA COPIES/ML, INSTRUMENT: 1575 COPIES/ML
EBV DNA SPEC NAA+PROBE-LOG#: 3.2 {LOG_COPIES}/ML

## 2023-11-20 NOTE — PROGRESS NOTES
VASCULAR MEDICINE CONSULT NOTE          LOCATION:  [facility] SURGERY CENTER      Date of Service: 11/17/2023      Primary Care Provider: Hoa Olivarez  Referring provider;  No ref. provider found      Reason for the visit/chief complaint:   PAD      HPI:  Aubrey Duncan is a pleasant 68 year old male who presents to our Vascular Medicine clinic accompanied by his spouse Kyung  with the above mentioned complaint.    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    For his peripheral arterial disease, he was previously following up with Dr. Boyd from vascular surgery last seen February 2023.  His disease appears to have come to medical attention around the time he had the heatstroke in June 202.  At that time, he did complain of right leg pain. Arterial duplex ultrasound and subsequent CTA showed significant PAD with multiple areas of arterial occlusion including heavily calcified tibial vessels and concern for occlusion of his below-knee popliteal artery. This started to cause him significant life disabling claudication of the right lower extremity that was unresponsive to walking program and conservative management.  Subsequently on 8/16/2022, the patient underwent right lower  extremity arteriogram with Dr. Boyd with attempted intervention however, it was found that he has unreconstructable PAD below the knee with multiple collaterals.  Medical management was recommended.  He has since followed up with vascular surgery for conservative management.  He was last seen by Dr. Boyd in February 2023 and by Chuyita Hernández through video visit in August 2023.  He was referred to our clinic to continue medical management for PAD.      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 This was followed by lower extremity venous ultrasound that showed right tibial and both peroneal veins DVT.  He was started on apixaban at that time.  He subsequently was seen by Dr. Cogan from the center for bleeding and clotting disorders here at the [facility] when apixaban was unaffordable to evaluate the need for ongoing anticoagulation.  He was seen in May 2023 and at that time apixaban was discontinued due to cost and he was switched to dual antiplatelet therapy with aspirin and clopidogrel.  Repeat lower extremity ultrasound showed no evidence of DVT and no indication for ongoing anticoagulation was felt necessary.      Today,  Mr. Duncan reports his right lower extremity pain continues to be stable but continues to have short distance claudication.  Denies rest pain or lower extremity wounds.  Interestingly, per reviewing his chart it does seem that he was previously on rivaroxaban 2.5 mg twice daily and aspirin 81 mg per the Compass trial.  It does seem that he struggled financially with rivaroxaban for which this was stopped at some point.  He currently continues on dual antiplatelet therapy with aspirin and clopidogrel.          REVIEW OF SYSTEMS:    A 12 point ROS was reviewed and is negative  except what is mentioned in HPI.       Past medical history, surgical history, medications, family history, social history and allergies were reviewed. Pertinent points mentioned under HPI.        OBJECTIVE:    Vital signs:  /71   Pulse 86   SpO2 95%   Wt Readings from Last 1 Encounters:   11/17/23 175 lb 1.6 oz (79.4 kg)     There is no height or weight on file to calculate BMI.    Physical exam:  General appearance: Pleasant male in no apparent distress.    HEENT: NC/AT.    Neck: Carotids +2/2 bilaterally without bruits.  No jugular venous distension.   Heart: RRR. Normal S1, S2.   Chest: Clear to auscultation bilaterally.  Abdomen: Soft, nontender, nondistended. No pulsatile mass.  No bruits.   Extremities: Was wearing 10 mmHg compression stockings.  Upon removal, there was pitting pedal edema noted more to the right lower extremity.  I was unable to feel his pulses of bilateral lower extremities on exam.  Feet pulses however were dopplerable with monophasic waveform on the right lower extremity DP and PT and what appears to be multiphasic on the left.  Skin: No skin wounds.  Neurological: Alert, awake and oriented           DIAGNOSTIC STUDIES:   Labs and diagnostics reviewed including outside records. Pertinent points are mentioned under HPI and assessment and plan sections.          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 Duncan's previous medical history as detailed above.  For medical  management for his PAD, we discussed optimizing his medications as well as walking program.  The patient does not do significant walking by himself although tries his best.  I suggested supervised walking program and he was intrigued.  We discussed the evidence behind walking program and he was agreeable to starting the program.    Regards to his medications, unfortunately he currently cannot afford direct oral coagulants including rivaroxaban 2.5 mg twice daily per his report.  Whether he needs to be on dual antiplatelet therapy with aspirin 81 mg and clopidogrel 75 mg, its clear with no great evidence that this might be more helpful.  However, for now we agreed to continue the current therapy and we will reevaluate.    He is on rosuvastatin 40 mg and LDL is at goal 51.  He will continue with that.    Blood pressure under good control.    Recommendations:  Referral to supervised walking program.  Continue DAPT for now.  Continue rosuvastatin 40 mg.  LDL at goal 51.  Follow-up in 3 months with repeat TC with exercise prior to the visit.    It was a pleasure meeting with Mr. Duncan and his wife in our clinic today.    Derick Carballo MD  Vascular Medicine  November 17, 2023

## 2023-11-20 NOTE — RESULT ENCOUNTER NOTE
Tacrolimus level 7.7 at 14 hours, on 11/17/23.  Goal 8-10.   Current dose 2 mg in AM, 2.5 mg in PM    Level likely at goal at 12 hours.  No dose change.  Repeat level with next set of labs.    [employer] message sent

## 2023-11-21 LAB
ATRIAL RATE - MUSE: 74 BPM
DIASTOLIC BLOOD PRESSURE - MUSE: NORMAL MMHG
INTERPRETATION ECG - MUSE: NORMAL
P AXIS - MUSE: 12 DEGREES
PR INTERVAL - MUSE: 170 MS
QRS DURATION - MUSE: 74 MS
QT - MUSE: 408 MS
QTC - MUSE: 452 MS
R AXIS - MUSE: -20 DEGREES
SYSTOLIC BLOOD PRESSURE - MUSE: NORMAL MMHG
T AXIS - MUSE: 83 DEGREES
VENTRICULAR RATE- MUSE: 74 BPM

## 2023-11-21 RX ORDER — CARVEDILOL 6.25 MG/1
6.25 TABLET ORAL 2 TIMES DAILY WITH MEALS
Qty: 120 TABLET | Refills: 0 | Status: SHIPPED | OUTPATIENT
Start: 2023-11-21 | End: 2024-01-23

## 2023-11-22 ENCOUNTER — MYC MEDICAL ADVICE (OUTPATIENT)
Dept: FAMILY MEDICINE | Facility: OTHER | Age: 68
End: 2023-11-22
Payer: MEDICARE

## 2023-11-24 DIAGNOSIS — Z94.2 LUNG REPLACED BY TRANSPLANT (H): ICD-10-CM

## 2023-11-27 DIAGNOSIS — Z94.2 LUNG REPLACED BY TRANSPLANT (H): Primary | ICD-10-CM

## 2023-11-27 RX ORDER — MONTELUKAST SODIUM 10 MG/1
10 TABLET ORAL EVERY EVENING
Qty: 90 TABLET | Refills: 3 | Status: SHIPPED | OUTPATIENT
Start: 2023-11-27

## 2023-11-27 RX ORDER — AZITHROMYCIN 250 MG/1
250 TABLET, FILM COATED ORAL
Qty: 38 TABLET | Refills: 3 | Status: SHIPPED | OUTPATIENT
Start: 2023-11-27 | End: 2024-01-09

## 2023-11-27 NOTE — PROGRESS NOTES
Pt has been on chronic valcyte 450 mg BID since 2014.  Due to cost and no detectible CMV, plan to stop valcyte.  Pt sent a [employer] message and said that he took his last dose this AM.  Plan to check CMV weekly for the next 2-3 weeks.  Pt aware of plan and will update tx office with any other questions or concerns.

## 2023-11-28 ENCOUNTER — HOSPITAL ENCOUNTER (OUTPATIENT)
Dept: CARDIAC REHAB | Facility: HOSPITAL | Age: 68
Setting detail: THERAPIES SERIES
Discharge: HOME | End: 2023-11-28
Attending: NURSE PRACTITIONER
Payer: MEDICARE

## 2023-11-28 LAB
GLUCOSE BLDC GLUCOMTR-MCNC: 118 MG/DL (ref 70–99)
GLUCOSE BLDC GLUCOMTR-MCNC: 94 MG/DL (ref 70–99)

## 2023-11-28 PROCEDURE — 93668 PERIPHERAL VASCULAR REHAB: CPT

## 2023-11-30 ENCOUNTER — HOSPITAL ENCOUNTER (OUTPATIENT)
Dept: CARDIAC REHAB | Facility: HOSPITAL | Age: 68
Discharge: HOME | End: 2023-11-30
Attending: INTERNAL MEDICINE
Payer: MEDICARE

## 2023-11-30 LAB
GLUCOSE BLDC GLUCOMTR-MCNC: 140 MG/DL (ref 70–99)
GLUCOSE BLDC GLUCOMTR-MCNC: 92 MG/DL (ref 70–99)

## 2023-11-30 PROCEDURE — 93668 PERIPHERAL VASCULAR REHAB: CPT

## 2023-12-04 ENCOUNTER — LAB (OUTPATIENT)
Dept: LAB | Facility: OTHER | Age: 68
End: 2023-12-04
Attending: INTERNAL MEDICINE
Payer: MEDICARE

## 2023-12-04 DIAGNOSIS — Z94.2 LUNG REPLACED BY TRANSPLANT (H): ICD-10-CM

## 2023-12-04 LAB
ANION GAP SERPL CALCULATED.3IONS-SCNC: 9 MMOL/L (ref 7–15)
BUN SERPL-MCNC: 30.8 MG/DL (ref 8–23)
CALCIUM SERPL-MCNC: 9.4 MG/DL (ref 8.8–10.2)
CHLORIDE SERPL-SCNC: 106 MMOL/L (ref 98–107)
CREAT SERPL-MCNC: 1.04 MG/DL (ref 0.67–1.17)
DEPRECATED HCO3 PLAS-SCNC: 26 MMOL/L (ref 22–29)
EGFRCR SERPLBLD CKD-EPI 2021: 77 ML/MIN/1.73M2
ERYTHROCYTE [DISTWIDTH] IN BLOOD BY AUTOMATED COUNT: 14.1 % (ref 10–15)
GLUCOSE SERPL-MCNC: 94 MG/DL (ref 70–99)
HCT VFR BLD AUTO: 37.2 % (ref 40–53)
HGB BLD-MCNC: 11.8 G/DL (ref 13.3–17.7)
MAGNESIUM SERPL-MCNC: 1.8 MG/DL (ref 1.7–2.3)
MCH RBC QN AUTO: 35.2 PG (ref 26.5–33)
MCHC RBC AUTO-ENTMCNC: 31.7 G/DL (ref 31.5–36.5)
MCV RBC AUTO: 111 FL (ref 78–100)
PLATELET # BLD AUTO: 184 10E3/UL (ref 150–450)
POTASSIUM SERPL-SCNC: 4.8 MMOL/L (ref 3.4–5.3)
RBC # BLD AUTO: 3.35 10E6/UL (ref 4.4–5.9)
SODIUM SERPL-SCNC: 141 MMOL/L (ref 135–145)
TACROLIMUS BLD-MCNC: 7.9 UG/L (ref 5–15)
TME LAST DOSE: NORMAL H
TME LAST DOSE: NORMAL H
WBC # BLD AUTO: 9.1 10E3/UL (ref 4–11)

## 2023-12-04 PROCEDURE — 80197 ASSAY OF TACROLIMUS: CPT | Mod: ZL

## 2023-12-04 PROCEDURE — 85027 COMPLETE CBC AUTOMATED: CPT | Mod: ZL

## 2023-12-04 PROCEDURE — 36415 COLL VENOUS BLD VENIPUNCTURE: CPT | Mod: ZL

## 2023-12-04 PROCEDURE — 83735 ASSAY OF MAGNESIUM: CPT | Mod: ZL

## 2023-12-04 PROCEDURE — 80048 BASIC METABOLIC PNL TOTAL CA: CPT | Mod: ZL

## 2023-12-05 ENCOUNTER — HOSPITAL ENCOUNTER (OUTPATIENT)
Dept: CARDIAC REHAB | Facility: HOSPITAL | Age: 68
Discharge: HOME | End: 2023-12-05
Attending: INTERNAL MEDICINE
Payer: MEDICARE

## 2023-12-05 LAB
CMV DNA SPEC NAA+PROBE-ACNC: NOT DETECTED IU/ML
GLUCOSE BLDC GLUCOMTR-MCNC: 81 MG/DL (ref 70–99)

## 2023-12-05 PROCEDURE — 93668 PERIPHERAL VASCULAR REHAB: CPT

## 2023-12-05 NOTE — RESULT ENCOUNTER NOTE
Saulo Burgos,   Your tacrolimus level was 7.9 at 12 hours on 12/4/23 which is within your goal range of 8-10. No dose change at this time. Please call the transplant office ([phone]) with any questions.   Thanks,   Svitlana

## 2023-12-07 ENCOUNTER — HOSPITAL ENCOUNTER (OUTPATIENT)
Dept: CARDIAC REHAB | Facility: HOSPITAL | Age: 68
Discharge: HOME | End: 2023-12-07
Attending: INTERNAL MEDICINE
Payer: MEDICARE

## 2023-12-07 DIAGNOSIS — I10 ESSENTIAL HYPERTENSION: Chronic | ICD-10-CM

## 2023-12-07 LAB
GLUCOSE BLDC GLUCOMTR-MCNC: 107 MG/DL (ref 70–99)
GLUCOSE BLDC GLUCOMTR-MCNC: 71 MG/DL (ref 70–99)
GLUCOSE BLDC GLUCOMTR-MCNC: 80 MG/DL (ref 70–99)

## 2023-12-07 PROCEDURE — 93668 PERIPHERAL VASCULAR REHAB: CPT

## 2023-12-10 RX ORDER — LISINOPRIL 40 MG/1
TABLET ORAL
Qty: 90 TABLET | Refills: 0 | Status: SHIPPED | OUTPATIENT
Start: 2023-12-10 | End: 2024-01-09

## 2023-12-12 ENCOUNTER — LAB (OUTPATIENT)
Dept: LAB | Facility: OTHER | Age: 68
End: 2023-12-12
Attending: INTERNAL MEDICINE
Payer: MEDICARE

## 2023-12-12 ENCOUNTER — HOSPITAL ENCOUNTER (OUTPATIENT)
Dept: CARDIAC REHAB | Facility: HOSPITAL | Age: 68
Discharge: HOME | End: 2023-12-12
Attending: INTERNAL MEDICINE
Payer: MEDICARE

## 2023-12-12 DIAGNOSIS — Z94.2 LUNG REPLACED BY TRANSPLANT (H): ICD-10-CM

## 2023-12-12 LAB
GLUCOSE BLDC GLUCOMTR-MCNC: 118 MG/DL (ref 70–99)
GLUCOSE BLDC GLUCOMTR-MCNC: 83 MG/DL (ref 70–99)
GLUCOSE BLDC GLUCOMTR-MCNC: 88 MG/DL (ref 70–99)

## 2023-12-12 PROCEDURE — 36415 COLL VENOUS BLD VENIPUNCTURE: CPT | Mod: ZL

## 2023-12-12 PROCEDURE — 93668 PERIPHERAL VASCULAR REHAB: CPT

## 2023-12-13 LAB — CMV DNA SPEC NAA+PROBE-ACNC: NOT DETECTED IU/ML

## 2023-12-14 ENCOUNTER — HOSPITAL ENCOUNTER (OUTPATIENT)
Dept: CARDIAC REHAB | Facility: HOSPITAL | Age: 68
Discharge: HOME | End: 2023-12-14
Attending: INTERNAL MEDICINE
Payer: MEDICARE

## 2023-12-14 LAB
GLUCOSE BLDC GLUCOMTR-MCNC: 101 MG/DL (ref 70–99)
GLUCOSE BLDC GLUCOMTR-MCNC: 109 MG/DL (ref 70–99)

## 2023-12-14 PROCEDURE — 93668 PERIPHERAL VASCULAR REHAB: CPT

## 2023-12-15 DIAGNOSIS — Z94.2 LUNG REPLACED BY TRANSPLANT (H): ICD-10-CM

## 2023-12-15 LAB
GLUCOSE BLDC GLUCOMTR-MCNC: 110 MG/DL (ref 70–99)
GLUCOSE BLDC GLUCOMTR-MCNC: 80 MG/DL (ref 70–99)

## 2023-12-15 RX ORDER — AZATHIOPRINE 50 MG/1
50 TABLET ORAL DAILY
Qty: 30 TABLET | Refills: 11 | Status: SHIPPED | OUTPATIENT
Start: 2023-12-15 | End: 2024-04-04

## 2023-12-19 ENCOUNTER — HOSPITAL ENCOUNTER (OUTPATIENT)
Dept: CARDIAC REHAB | Facility: HOSPITAL | Age: 68
Discharge: HOME | End: 2023-12-19
Attending: INTERNAL MEDICINE
Payer: MEDICARE

## 2023-12-19 LAB
GLUCOSE BLDC GLUCOMTR-MCNC: 112 MG/DL (ref 70–99)
GLUCOSE BLDC GLUCOMTR-MCNC: 87 MG/DL (ref 70–99)

## 2023-12-19 PROCEDURE — 93668 PERIPHERAL VASCULAR REHAB: CPT

## 2023-12-19 PROCEDURE — 999N000124 HC STATISTIC PAD/SET VISIT

## 2023-12-21 ENCOUNTER — HOSPITAL ENCOUNTER (OUTPATIENT)
Dept: CARDIAC REHAB | Facility: HOSPITAL | Age: 68
Discharge: HOME | End: 2023-12-21
Attending: INTERNAL MEDICINE
Payer: MEDICARE

## 2023-12-21 DIAGNOSIS — Z94.2 S/P LUNG TRANSPLANT (H): Chronic | ICD-10-CM

## 2023-12-21 LAB
GLUCOSE BLDC GLUCOMTR-MCNC: 108 MG/DL (ref 70–99)
GLUCOSE BLDC GLUCOMTR-MCNC: 84 MG/DL (ref 70–99)
GLUCOSE BLDC GLUCOMTR-MCNC: 91 MG/DL (ref 70–99)

## 2023-12-21 PROCEDURE — 999N000124 HC STATISTIC PAD/SET VISIT

## 2023-12-21 PROCEDURE — 93668 PERIPHERAL VASCULAR REHAB: CPT

## 2023-12-21 RX ORDER — TACROLIMUS 0.5 MG/1
0.5 CAPSULE ORAL EVERY EVENING
Qty: 90 CAPSULE | Refills: 3 | Status: SHIPPED | OUTPATIENT
Start: 2023-12-21 | End: 2024-04-04

## 2023-12-21 RX ORDER — TACROLIMUS 1 MG/1
2 CAPSULE ORAL 2 TIMES DAILY
Qty: 120 CAPSULE | Refills: 11 | Status: SHIPPED | OUTPATIENT
Start: 2023-12-21 | End: 2024-04-04

## 2023-12-22 LAB — GLUCOSE BLDC GLUCOMTR-MCNC: 73 MG/DL (ref 70–99)

## 2023-12-26 ENCOUNTER — HOSPITAL ENCOUNTER (OUTPATIENT)
Dept: CARDIAC REHAB | Facility: HOSPITAL | Age: 68
Discharge: HOME | End: 2023-12-26
Attending: INTERNAL MEDICINE
Payer: MEDICARE

## 2023-12-26 ENCOUNTER — TELEPHONE (OUTPATIENT)
Dept: FAMILY MEDICINE | Facility: OTHER | Age: 68
End: 2023-12-26
Payer: MEDICARE

## 2023-12-26 LAB
GLUCOSE BLDC GLUCOMTR-MCNC: 101 MG/DL (ref 70–99)
GLUCOSE BLDC GLUCOMTR-MCNC: 132 MG/DL (ref 70–99)

## 2023-12-26 PROCEDURE — 93668 PERIPHERAL VASCULAR REHAB: CPT

## 2023-12-26 NOTE — TELEPHONE ENCOUNTER
[employer] med assistance form waiting for provider signature.    Dixie Jimenez LPN on 12/26/2023 at 3:25 PM

## 2023-12-26 NOTE — TELEPHONE ENCOUNTER
Signed forms faxed to [employer] at Fax # [phone].  Pt notified.  He will  his copies at [address] reception area on Friday.  Dixie Jimenez LPN on 12/26/2023 at 4:26 PM

## 2023-12-28 ENCOUNTER — HOSPITAL ENCOUNTER (OUTPATIENT)
Dept: CARDIAC REHAB | Facility: HOSPITAL | Age: 68
Discharge: HOME | End: 2023-12-28
Attending: INTERNAL MEDICINE
Payer: MEDICARE

## 2023-12-28 LAB
GLUCOSE BLDC GLUCOMTR-MCNC: 101 MG/DL (ref 70–99)
GLUCOSE BLDC GLUCOMTR-MCNC: 122 MG/DL (ref 70–99)

## 2023-12-28 PROCEDURE — 93668 PERIPHERAL VASCULAR REHAB: CPT

## 2023-12-29 ENCOUNTER — LAB (OUTPATIENT)
Dept: LAB | Facility: OTHER | Age: 68
End: 2023-12-29
Payer: MEDICARE

## 2023-12-29 DIAGNOSIS — Z94.2 LUNG TRANSPLANT STATUS, BILATERAL (H): ICD-10-CM

## 2023-12-29 DIAGNOSIS — Z94.2 LUNG REPLACED BY TRANSPLANT (H): ICD-10-CM

## 2023-12-29 LAB
ANION GAP SERPL CALCULATED.3IONS-SCNC: 10 MMOL/L (ref 7–15)
BUN SERPL-MCNC: 27.4 MG/DL (ref 8–23)
CALCIUM SERPL-MCNC: 8.8 MG/DL (ref 8.8–10.2)
CHLORIDE SERPL-SCNC: 107 MMOL/L (ref 98–107)
CREAT SERPL-MCNC: 0.97 MG/DL (ref 0.67–1.17)
DEPRECATED HCO3 PLAS-SCNC: 24 MMOL/L (ref 22–29)
EGFRCR SERPLBLD CKD-EPI 2021: 84 ML/MIN/1.73M2
ERYTHROCYTE [DISTWIDTH] IN BLOOD BY AUTOMATED COUNT: 12.9 % (ref 10–15)
GLUCOSE SERPL-MCNC: 93 MG/DL (ref 70–99)
HCT VFR BLD AUTO: 36.1 % (ref 40–53)
HGB BLD-MCNC: 11.7 G/DL (ref 13.3–17.7)
HOLD SPECIMEN: NORMAL
HOLD SPECIMEN: NORMAL
MAGNESIUM SERPL-MCNC: 1.7 MG/DL (ref 1.7–2.3)
MCH RBC QN AUTO: 34.8 PG (ref 26.5–33)
MCHC RBC AUTO-ENTMCNC: 32.4 G/DL (ref 31.5–36.5)
MCV RBC AUTO: 107 FL (ref 78–100)
PLATELET # BLD AUTO: 188 10E3/UL (ref 150–450)
POTASSIUM SERPL-SCNC: 4.4 MMOL/L (ref 3.4–5.3)
RBC # BLD AUTO: 3.36 10E6/UL (ref 4.4–5.9)
SODIUM SERPL-SCNC: 141 MMOL/L (ref 135–145)
TACROLIMUS BLD-MCNC: 8.7 UG/L (ref 5–15)
TME LAST DOSE: NORMAL H
TME LAST DOSE: NORMAL H
WBC # BLD AUTO: 9 10E3/UL (ref 4–11)

## 2023-12-29 PROCEDURE — 83735 ASSAY OF MAGNESIUM: CPT | Mod: ZL

## 2023-12-29 PROCEDURE — 85027 COMPLETE CBC AUTOMATED: CPT | Mod: ZL

## 2023-12-29 PROCEDURE — 80197 ASSAY OF TACROLIMUS: CPT | Mod: ZL

## 2023-12-29 PROCEDURE — 36415 COLL VENOUS BLD VENIPUNCTURE: CPT | Mod: ZL

## 2023-12-29 PROCEDURE — 80048 BASIC METABOLIC PNL TOTAL CA: CPT | Mod: ZL

## 2023-12-30 LAB — CMV DNA SPEC NAA+PROBE-ACNC: NOT DETECTED IU/ML

## 2024-01-02 ENCOUNTER — OFFICE VISIT (OUTPATIENT)
Dept: OPHTHALMOLOGY | Facility: CLINIC | Age: 69
End: 2024-01-02
Attending: OPHTHALMOLOGY
Payer: MEDICARE

## 2024-01-02 ENCOUNTER — PRE VISIT (OUTPATIENT)
Dept: OPHTHALMOLOGY | Facility: CLINIC | Age: 69
End: 2024-01-02

## 2024-01-02 DIAGNOSIS — Z94.2 S/P LUNG TRANSPLANT (H): Chronic | ICD-10-CM

## 2024-01-02 DIAGNOSIS — H02.9 LESION OF LEFT LOWER EYELID: Primary | ICD-10-CM

## 2024-01-02 DIAGNOSIS — Z85.828 HISTORY OF SKIN CANCER: ICD-10-CM

## 2024-01-02 PROCEDURE — 88360 TUMOR IMMUNOHISTOCHEM/MANUAL: CPT | Mod: 26 | Performed by: OPHTHALMOLOGY

## 2024-01-02 PROCEDURE — 92285 EXTERNAL OCULAR PHOTOGRAPHY: CPT | Mod: GC | Performed by: OPHTHALMOLOGY

## 2024-01-02 PROCEDURE — 88360 TUMOR IMMUNOHISTOCHEM/MANUAL: CPT | Mod: TC | Performed by: OPHTHALMOLOGY

## 2024-01-02 PROCEDURE — 99203 OFFICE O/P NEW LOW 30 MIN: CPT | Mod: 57 | Performed by: OPHTHALMOLOGY

## 2024-01-02 PROCEDURE — 88305 TISSUE EXAM BY PATHOLOGIST: CPT | Mod: 26 | Performed by: OPHTHALMOLOGY

## 2024-01-02 PROCEDURE — 67961 REVISION OF EYELID: CPT | Mod: E2 | Performed by: OPHTHALMOLOGY

## 2024-01-02 RX ORDER — ERYTHROMYCIN 5 MG/G
OINTMENT OPHTHALMIC ONCE
Status: COMPLETED | OUTPATIENT
Start: 2024-01-02 | End: 2024-01-02

## 2024-01-02 RX ADMIN — ERYTHROMYCIN 1 G: 5 OINTMENT OPHTHALMIC at 09:36

## 2024-01-02 ASSESSMENT — CONF VISUAL FIELD
OD_SUPERIOR_NASAL_RESTRICTION: 0
OS_NORMAL: 1
OS_SUPERIOR_NASAL_RESTRICTION: 0
OD_INFERIOR_TEMPORAL_RESTRICTION: 0
OD_NORMAL: 1
OS_INFERIOR_TEMPORAL_RESTRICTION: 0
OD_SUPERIOR_TEMPORAL_RESTRICTION: 0
OS_INFERIOR_NASAL_RESTRICTION: 0
OD_INFERIOR_NASAL_RESTRICTION: 0
OS_SUPERIOR_TEMPORAL_RESTRICTION: 0

## 2024-01-02 ASSESSMENT — SLIT LAMP EXAM - LIDS: COMMENTS: DERMATOCHALASIS

## 2024-01-02 ASSESSMENT — TONOMETRY
OD_IOP_MMHG: 10
IOP_METHOD: ICARE
OS_IOP_MMHG: 10

## 2024-01-02 ASSESSMENT — EXTERNAL EXAM - LEFT EYE: OS_EXAM: NORMAL

## 2024-01-02 ASSESSMENT — EXTERNAL EXAM - RIGHT EYE: OD_EXAM: NORMAL

## 2024-01-02 ASSESSMENT — VISUAL ACUITY
OS_SC: 20/80
OD_SC: 20/30
OD_PH_SC: 20/25
METHOD: SNELLEN - LINEAR

## 2024-01-02 NOTE — RESULT ENCOUNTER NOTE
Tacrolimus level 8.7 at 12 hours, on 12/29/23.  Goal 8-10.   Current dose 2 mg in AM, 2.5 mg in PM    Level at goal.  No dose change.    [employer] message sent

## 2024-01-02 NOTE — LETTER
"January 11, 2024      Aubrey Duncan   [address]  [address] [place] [phone]        Dear Dr. Smith,    Please see below for the test results for Aubrey Duncan. The pathologist called me, and commented it remains consistent with actinic keratosis, but there is evidence of significant sun damage so he should be under the care of a dermatologist. As a side note, the reason she called me was to tell me he had a very heavy Demodex burden, so if he has blepharitis type complaints, it may be worth considering therapy tailored towards Demodex.     Resulted Orders   Surgical Pathology Exam   Result Value Ref Range    Case Report       Surgical Pathology Report                         Case: [id number]                                  Authorizing Provider:  Janice Sullivan MD      Collected:           01/02/2024 09:29 AM          Ordering Location:     [facility]      Received:            01/02/2024 10:28 AM                                 [facility]                                                              Pathologist:           Petrona Barajas MD                                                         Specimen:    Eyelid, Lower, Left, Left lateral canthus involving eyelid margin                          Final Diagnosis       Lower eyelid lateral canthus, left, lesion excision:   Actinic keratosis.   Presence of mites, most likely Demodex.      Clinical Information       The patient is a 68 year old male with a history of actinic keratosis and a lesion of the left lateral canthus involving the eyelid margin. He undergoes lesion excision on the left.      Gross Description       A(A). Eyelid, Lower, Left, Left lateral canthus involving eyelid margin:  The specimen is received in formalin with proper patient identification, labeled \"left lateral canthus involving eyelid margin\".  The specimen consists of a 2.0 cm in length by 0.5 cm in diameter piece of white-tan skin excised to a maximum depth " of 0.1 cm.  The subcutaneous surface is inked black, and the specimen is serially sectioned and entirely submitted.    A1-bilateral tips  A2-remainder of specimen       Microscopic Description       The tissue consist of skin with hyperkeratosis and focal parakeratosis with loss of the granular layer. There is partial-thickness dysplasia of the epidermis and moderate atypia of the basal layers. Every hair follicle contains structures consistent with mites. There is dense elastotic degeneration in the dermis. On immunohistochemistry with Ki-67, cell proliferation is present to approximately 50% epidermal thickness. No invasive lesion is identified in the sections examined. The lesion extends into the horizontal margins of excision.      Performing Labs       The technical component of this testing was completed at [facility]      Case Images         Thank you for trusting me with the care of your patient.  For further details, please feel free to contact our office for additional records.  If you wish to contact me regarding this patient please email me at [email or web address] or give my  a call at [phone], to arrange a phone conversation.      Sincerely,    Janice Sullivan MD    Oculoplastic and Orbital Surgery   Department of Ophthalmology and Visual Neurosciences  [facility]

## 2024-01-02 NOTE — NURSING NOTE
Chief Complaints and History of Present Illnesses   Patient presents with    Lesion On Left Lower Lid     Chief Complaint(s) and History of Present Illness(es)       Lesion On Left Lower Lid              Laterality: left lower lid    Associated symptoms: Negative for red eye, blurred vision, discharge and mattering              Comments    non healing, lateral canthus/lid margin lesion LLL.Patient states lesion has been present for 3-4 years. Patient states it feels scratchy. Patient denies pain and discomfort each eye. Patient states the lesion does not effect vision.   Genie Stalilngs, MARY January 2, 2024 8:41 AM

## 2024-01-02 NOTE — PROGRESS NOTES
Oculoplastic Clinic New Patient    Patient: Aubrey Duncan MRN# [id number]   YOB: 1955 Age: 68 year old   Date of Visit: Jan 2, 2024         CC: Eyelid lesion    Chief Complaint(s) and History of Present Illness(es)     Lesion On Left Lower Lid            Laterality: left lower lid    Associated symptoms: Negative for red eye, blurred vision, discharge and   mattering      Comments    non healing, lateral canthus/lid margin lesion LLL.Patient states lesion   has been present for 3-4 years. Patient states it feels scratchy. Patient   denies pain and discomfort each eye. Patient states the lesion does not   effect vision.   MARY Snowden January 2, 2024 8:41 AM             HPI:     Aubrey Duncan is a 68 year old male who has noted a lesion on the left lower eyelid. It has been present for 3-4 years. This has been removed in the past (around 2019), results consistent with actinic keratosis (per chart review).    Enlarging: Yes; intermittently grows and can be mechanically removed by patient  Irritating to eyelashes and catches in folds of skin: Yes  Bleeding: No  Prior cutaneous malignancy: Yes; multiple BCC and SCC on scalp/face. No personal history of melanoma; family history of melanoma in niece.   Immunosuppression: Yes; s/p double lung transplant (2013)         Assessment and Plan:     Encounter Diagnoses   Name Primary?    Lesion of left lower eyelid Yes    History of skin cancer     S/P lung transplant (H)          Given history of AK in the area concern of persistent AK versus squamous cell.  Due to driving distance will excise around the lesion grossly today, but if it returns as squamous cell, he understands he may need to return for Mohs and recon.            Anusha Ramos MD  Oculoplastic Surgery Fellow         Operative Note - Eyelid Biopsy      Pre-operative Diagnosis: Lesion left lateral canthus involving the eyelid margin.    Post-operative Diagnosis: Same.    Procedure: Excision of  eyelid neoplasm.    Surgeon: Janice Sullivan MD    Assistant: Anusha Ramos MD     Anesthesia: Local infiltration with 2% Lidocaine and Epinephrine.    Complications: None.    Estimated blood loss: <5 mL    Specimen: Eyelid neoplasm to pathology.    Procedure: The patient was brought to the minor procedure room and placed supine on the operating table.  The involved eyelid was infiltrated with local anesthetic.  The area was prepped and draped in the typical sterile fashion.  A tooth forceps was used to elevate the lesion and it was excised at its base with a Girma scissors.  Hemostasis was obtained with a high temperature cautery.  The excised diameter measured 8x4 mm.      Closure of wound: local advancement flap. The skin and orbicularis was undermined superiorly and inferiorly. This allowed advancement of the tissue to close over the area of the defect without tension on the skin edges. Closure was with 6-0 plain gut sutures.     Dressing: The wound was dressed with Erythromycin ophthalmic ointment.     The patient left the minor procedure room in stable condition.    I was present for the entire procedure. Janice Sullivan MD       Attending Physician Attestation: Complete documentation of historical and exam elements from today's encounter can be found in the full encounter summary report (not reduplicated in this progress note). I personally obtained the chief complaint(s) and history of present illness. I confirmed and edited as necessary the review of systems, past medical/surgical history, family history, social history, and examination findings as documented by others; and I examined the patient myself. I personally reviewed the relevant tests, images, and reports as documented above. I formulated and edited as necessary the assessment and plan and discussed the findings and management plan with the patient. Janice Sullivan MD    Today with Aubrey Duncan, I reviewed the indications, risks,  benefits, and alternatives of the proposed surgical procedure including, but not limited to, failure obtain the desired result  and need for additional surgery, bleeding, infection, loss of vision, injury to the eye, and the remote possibility of permanent damage to any organ system or death with the use of anesthesia.  I provided multiple opportunities for the questions, answered all questions to the best of my ability, and confirmed that my answers and my discussion were understood. Janice Sullivan MD

## 2024-01-02 NOTE — RESULT ENCOUNTER NOTE
CMV negative on 12/29/23.  Pt stopped valcyte on 11/27/23 and CMV has been negative since stopping it.  Plan to repeat CMV level in 2 weeks and decrease frequency of labs if still negative.  [employer] message sent to pt.

## 2024-01-02 NOTE — LETTER
2024         RE:  :  MRN: Aubrey Duncan  1955  [id number]     Dear Dr. Smith,    Thank you for asking me to see your patient, Aubrey Duncan, for an oculoplastic   consultation.  My assessment and plan are below.  For further details, please see my attached clinic note.      CC: Eyelid lesion    Chief Complaint(s) and History of Present Illness(es)     Lesion On Left Lower Lid            Laterality: left lower lid    Associated symptoms: Negative for red eye, blurred vision, discharge and   mattering      Comments    non healing, lateral canthus/lid margin lesion LLL.Patient states lesion   has been present for 3-4 years. Patient states it feels scratchy. Patient   denies pain and discomfort each eye. Patient states the lesion does not   effect vision.   MARY Snowden 2024 8:41 AM             HPI:     Aubrey Duncan is a 68 year old male who has noted a lesion on the left lower eyelid. It has been present for 3-4 years. This has been removed in the past (around ), results consistent with actinic keratosis (per chart review).    Enlarging: Yes; intermittently grows and can be mechanically removed by patient  Irritating to eyelashes and catches in folds of skin: Yes  Bleeding: No  Prior cutaneous malignancy: Yes; multiple BCC and SCC on scalp/face. No personal history of melanoma; family history of melanoma in niece.   Immunosuppression: Yes; s/p double lung transplant ()         Assessment and Plan:     Encounter Diagnoses   Name Primary?    Lesion of left lower eyelid Yes    History of skin cancer     S/P lung transplant (H)          Given history of AK in the area concern of persistent AK versus squamous cell.  Due to driving distance will excise around the lesion grossly today, but if it returns as squamous cell, he understands he may need to return for Mohs and recon.           Again, thank you for allowing me to participate in the care of your patient.      Sincerely,    Janice  MD Laurie  Department of Ophthalmology and Visual Neurosciences  [facility]    CC: Bassam Smith MD  [address]  [facility] [address]  Via Fax: [phone]

## 2024-01-04 ENCOUNTER — HOSPITAL ENCOUNTER (OUTPATIENT)
Dept: CARDIAC REHAB | Facility: HOSPITAL | Age: 69
Discharge: HOME | End: 2024-01-04
Attending: INTERNAL MEDICINE
Payer: MEDICARE

## 2024-01-04 DIAGNOSIS — Z94.2 LUNG REPLACED BY TRANSPLANT (H): ICD-10-CM

## 2024-01-04 PROCEDURE — 93668 PERIPHERAL VASCULAR REHAB: CPT

## 2024-01-04 PROCEDURE — 999N000124 HC STATISTIC PAD/SET VISIT

## 2024-01-04 RX ORDER — DAPSONE 25 MG/1
50 TABLET ORAL DAILY
Qty: 180 TABLET | Refills: 3 | Status: SHIPPED | OUTPATIENT
Start: 2024-01-04

## 2024-01-05 LAB
PATH REPORT.COMMENTS IMP SPEC: NORMAL
PATH REPORT.COMMENTS IMP SPEC: NORMAL
PATH REPORT.FINAL DX SPEC: NORMAL
PATH REPORT.GROSS SPEC: NORMAL
PATH REPORT.MICROSCOPIC SPEC OTHER STN: NORMAL
PATH REPORT.RELEVANT HX SPEC: NORMAL
PHOTO IMAGE: NORMAL

## 2024-01-09 ENCOUNTER — NURSE TRIAGE (OUTPATIENT)
Dept: FAMILY MEDICINE | Facility: OTHER | Age: 69
End: 2024-01-09
Payer: MEDICARE

## 2024-01-09 ENCOUNTER — HOSPITAL ENCOUNTER (EMERGENCY)
Facility: OTHER | Age: 69
Discharge: ANOTHER HOSPITAL | End: 2024-01-10
Attending: FAMILY MEDICINE | Admitting: FAMILY MEDICINE
Payer: MEDICARE

## 2024-01-09 ENCOUNTER — APPOINTMENT (OUTPATIENT)
Dept: GENERAL RADIOLOGY | Facility: OTHER | Age: 69
End: 2024-01-09
Attending: FAMILY MEDICINE
Payer: MEDICARE

## 2024-01-09 DIAGNOSIS — I21.4 NSTEMI (NON-ST ELEVATED MYOCARDIAL INFARCTION) (H): ICD-10-CM

## 2024-01-09 DIAGNOSIS — R07.9 CHEST PAIN, UNSPECIFIED TYPE: ICD-10-CM

## 2024-01-09 LAB
ALBUMIN SERPL BCG-MCNC: 3.8 G/DL (ref 3.5–5.2)
ALP SERPL-CCNC: 46 U/L (ref 40–150)
ALT SERPL W P-5'-P-CCNC: 51 U/L (ref 0–70)
ANION GAP SERPL CALCULATED.3IONS-SCNC: 10 MMOL/L (ref 7–15)
APTT PPP: 28 SECONDS (ref 22–38)
AST SERPL W P-5'-P-CCNC: 42 U/L (ref 0–45)
BASOPHILS # BLD AUTO: 0 10E3/UL (ref 0–0.2)
BASOPHILS NFR BLD AUTO: 0 %
BILIRUB SERPL-MCNC: 0.6 MG/DL
BUN SERPL-MCNC: 29.5 MG/DL (ref 8–23)
CALCIUM SERPL-MCNC: 9.3 MG/DL (ref 8.8–10.2)
CHLORIDE SERPL-SCNC: 108 MMOL/L (ref 98–107)
CREAT SERPL-MCNC: 1.04 MG/DL (ref 0.67–1.17)
DEPRECATED HCO3 PLAS-SCNC: 24 MMOL/L (ref 22–29)
EGFRCR SERPLBLD CKD-EPI 2021: 77 ML/MIN/1.73M2
EOSINOPHIL # BLD AUTO: 0.2 10E3/UL (ref 0–0.7)
EOSINOPHIL NFR BLD AUTO: 2 %
ERYTHROCYTE [DISTWIDTH] IN BLOOD BY AUTOMATED COUNT: 13.4 % (ref 10–15)
GLUCOSE BLDC GLUCOMTR-MCNC: 123 MG/DL (ref 70–99)
GLUCOSE BLDC GLUCOMTR-MCNC: 176 MG/DL (ref 70–99)
GLUCOSE SERPL-MCNC: 136 MG/DL (ref 70–99)
HCT VFR BLD AUTO: 33.7 % (ref 40–53)
HGB BLD-MCNC: 11.1 G/DL (ref 13.3–17.7)
HOLD SPECIMEN: NORMAL
IMM GRANULOCYTES # BLD: 0.1 10E3/UL
IMM GRANULOCYTES NFR BLD: 1 %
INR PPP: 1.03 (ref 0.85–1.15)
LYMPHOCYTES # BLD AUTO: 1.5 10E3/UL (ref 0.8–5.3)
LYMPHOCYTES NFR BLD AUTO: 14 %
MCH RBC QN AUTO: 34.2 PG (ref 26.5–33)
MCHC RBC AUTO-ENTMCNC: 32.9 G/DL (ref 31.5–36.5)
MCV RBC AUTO: 104 FL (ref 78–100)
MONOCYTES # BLD AUTO: 1.2 10E3/UL (ref 0–1.3)
MONOCYTES NFR BLD AUTO: 11 %
NEUTROPHILS # BLD AUTO: 7.9 10E3/UL (ref 1.6–8.3)
NEUTROPHILS NFR BLD AUTO: 72 %
NRBC # BLD AUTO: 0 10E3/UL
NRBC BLD AUTO-RTO: 0 /100
PLATELET # BLD AUTO: 194 10E3/UL (ref 150–450)
POTASSIUM SERPL-SCNC: 4 MMOL/L (ref 3.4–5.3)
PROT SERPL-MCNC: 6 G/DL (ref 6.4–8.3)
RBC # BLD AUTO: 3.25 10E6/UL (ref 4.4–5.9)
SODIUM SERPL-SCNC: 142 MMOL/L (ref 135–145)
TROPONIN T SERPL HS-MCNC: 19 NG/L
TROPONIN T SERPL HS-MCNC: 24 NG/L
TROPONIN T SERPL HS-MCNC: 29 NG/L
WBC # BLD AUTO: 10.9 10E3/UL (ref 4–11)

## 2024-01-09 PROCEDURE — 99285 EMERGENCY DEPT VISIT HI MDM: CPT | Mod: 25 | Performed by: FAMILY MEDICINE

## 2024-01-09 PROCEDURE — 85025 COMPLETE CBC W/AUTO DIFF WBC: CPT | Performed by: FAMILY MEDICINE

## 2024-01-09 PROCEDURE — 82962 GLUCOSE BLOOD TEST: CPT

## 2024-01-09 PROCEDURE — 84484 ASSAY OF TROPONIN QUANT: CPT | Performed by: FAMILY MEDICINE

## 2024-01-09 PROCEDURE — 99285 EMERGENCY DEPT VISIT HI MDM: CPT | Performed by: FAMILY MEDICINE

## 2024-01-09 PROCEDURE — 250N000013 HC RX MED GY IP 250 OP 250 PS 637: Performed by: PHYSICIAN ASSISTANT

## 2024-01-09 PROCEDURE — 84484 ASSAY OF TROPONIN QUANT: CPT | Mod: 91 | Performed by: PHYSICIAN ASSISTANT

## 2024-01-09 PROCEDURE — 93005 ELECTROCARDIOGRAM TRACING: CPT | Performed by: FAMILY MEDICINE

## 2024-01-09 PROCEDURE — 85610 PROTHROMBIN TIME: CPT | Performed by: FAMILY MEDICINE

## 2024-01-09 PROCEDURE — 93010 ELECTROCARDIOGRAM REPORT: CPT | Mod: 76 | Performed by: STUDENT IN AN ORGANIZED HEALTH CARE EDUCATION/TRAINING PROGRAM

## 2024-01-09 PROCEDURE — 85730 THROMBOPLASTIN TIME PARTIAL: CPT | Performed by: FAMILY MEDICINE

## 2024-01-09 PROCEDURE — 36415 COLL VENOUS BLD VENIPUNCTURE: CPT | Performed by: FAMILY MEDICINE

## 2024-01-09 PROCEDURE — 36415 COLL VENOUS BLD VENIPUNCTURE: CPT | Performed by: PHYSICIAN ASSISTANT

## 2024-01-09 PROCEDURE — 93005 ELECTROCARDIOGRAM TRACING: CPT | Mod: 76 | Performed by: FAMILY MEDICINE

## 2024-01-09 PROCEDURE — 80053 COMPREHEN METABOLIC PANEL: CPT | Performed by: FAMILY MEDICINE

## 2024-01-09 PROCEDURE — 71046 X-RAY EXAM CHEST 2 VIEWS: CPT

## 2024-01-09 RX ORDER — ASPIRIN 325 MG
325 TABLET ORAL ONCE
Status: COMPLETED | OUTPATIENT
Start: 2024-01-09 | End: 2024-01-09

## 2024-01-09 RX ORDER — CARVEDILOL 6.25 MG/1
6.25 TABLET ORAL 2 TIMES DAILY WITH MEALS
Status: DISCONTINUED | OUTPATIENT
Start: 2024-01-09 | End: 2024-01-11 | Stop reason: HOSPADM

## 2024-01-09 RX ORDER — LISINOPRIL 40 MG/1
40 TABLET ORAL DAILY
COMMUNITY
End: 2024-03-19

## 2024-01-09 RX ORDER — ROSUVASTATIN CALCIUM 40 MG/1
20 TABLET, COATED ORAL
COMMUNITY

## 2024-01-09 RX ORDER — AZITHROMYCIN 250 MG/1
250 TABLET, FILM COATED ORAL
COMMUNITY

## 2024-01-09 RX ADMIN — ASPIRIN 325 MG ORAL TABLET 325 MG: 325 PILL ORAL at 13:31

## 2024-01-09 RX ADMIN — CARVEDILOL 6.25 MG: 6.25 TABLET, FILM COATED ORAL at 18:16

## 2024-01-09 RX ADMIN — TICAGRELOR 180 MG: 90 TABLET ORAL at 13:31

## 2024-01-09 ASSESSMENT — ENCOUNTER SYMPTOMS
COUGH: 0
CHEST TIGHTNESS: 1
GASTROINTESTINAL NEGATIVE: 1
SHORTNESS OF BREATH: 0
PALPITATIONS: 0
CONSTITUTIONAL NEGATIVE: 1
WHEEZING: 0

## 2024-01-09 ASSESSMENT — ACTIVITIES OF DAILY LIVING (ADL)
ADLS_ACUITY_SCORE: 35

## 2024-01-09 NOTE — PHARMACY-ADMISSION MEDICATION HISTORY
Pharmacist Admission Medication History    Admission medication history is complete. The information provided in this note is only as accurate as the sources available at the time of the update.    Information Source(s): Patient and CareEverywhere/SureScripts via in-person    Pertinent Information: Patient is getting is transplant medication and his insulin from a  hospital in the [facility]. Medication was verified verbally by patient.     Changes made to PTA medication list:  Added: None  Deleted:   Fluorouracil  Maxitrol  Ondansetron  Tobramycin 0.3% ophthalmic   Changed: None    Medication Affordability:  Not including over the counter (OTC) medications, was there a time in the past 3 months when you did not take your medications as prescribed because of cost?: No    Allergies reviewed with patient and updates made in EHR: yes    Medication History Completed By: Elfego Rodriguez RP 1/9/2024 5:01 PM    Prior to Admission medications    Medication Sig Last Dose Taking? Auth Provider Long Term End Date   acetaminophen (TYLENOL) 325 MG tablet Take 2 tablets (650 mg) by mouth every 6 hours as needed for mild pain Unknown Yes Vanda Boyd MD     albuterol (PROAIR HFA/PROVENTIL HFA/VENTOLIN HFA) 108 (90 Base) MCG/ACT inhaler Inhale 1-2 puffs into the lungs every 6 hours as needed for shortness of breath or wheezing Unknown Yes Alma Murphy MD Yes    amLODIPine (NORVASC) 10 MG tablet Take 1 tablet (10 mg) by mouth daily 1/9/2024 at Am Yes Hoa Olivarez APRN CNP Yes    aspirin (ASA) 81 MG EC tablet Take 1 tablet (81 mg) by mouth daily 1/9/2024 at AM Yes Vanda Boyd MD Yes    azaTHIOprine (IMURAN) 50 MG tablet Take 1 tablet (50 mg) by mouth daily 1/9/2024 at AM Yes Alma Murphy MD Yes    azithromycin (ZITHROMAX) 250 MG tablet Take 250 mg by mouth Every Mon, Wed, Fri Morning 1/8/2024 at AM Yes Unknown, Entered By History     Calcium Carbonate-Vitamin D 600-10 MG-MCG TABS Take 1 tablet by mouth 2 times  daily (with meals) 1/9/2024 at AM Yes Starr Puentes PA-C     carvedilol (COREG) 6.25 MG tablet TAKE 1 TABLET (6.25 MG) BY MOUTH 2 TIMES DAILY (WITH MEALS) 1/9/2024 at AM Yes Ana Cannon MD Yes    clopidogrel (PLAVIX) 75 MG tablet Take 1 tablet (75 mg) by mouth daily 1/9/2024 at AM Yes Vanda Boyd MD Yes    dapsone (ACZONE) 25 MG tablet Take 2 tablets (50 mg) by mouth daily 1/9/2024 at AM Yes Alma Murphy MD     econazole nitrate 1 % external cream APPLY TOPICALLY DAILY TO FEET AND HEELS. 1/9/2024 at AM Yes Robbin Rosales DPM     fludrocortisone (FLORINEF) 0.1 MG tablet Take 1 tablet (0.1 mg) by mouth daily 1/9/2024 at AM Yes Alma Murphy MD Yes    fluticasone-salmeterol (ADVAIR-HFA) 230-21 MCG/ACT inhaler Inhale 2 puffs into the lungs 2 times daily 1/9/2024 at AM Yes Alma Murphy MD Yes    furosemide (LASIX) 20 MG tablet Take 1 tablet (20 mg) by mouth daily 1/9/2024 at AM Yes Hoa Olivarez APRN CNP Yes    insulin aspart (NOVOLOG PEN) 100 UNIT/ML pen Take 5 U am, 3 unit(s) non, 5 unit(s) pm of insulin within 30 minutes of start of breakfast, lunch, and dinner. Do not give if blood sugar is less than 70 mg/dl. 1/9/2024 at AM Yes Hoa Olivarez APRN CNP Yes    insulin glargine (LANTUS PEN) 100 UNIT/ML pen Inject 18 Units Subcutaneous every morning (before breakfast) 1/9/2024 at AM Yes Alma Murphy MD Yes    lisinopril (ZESTRIL) 40 MG tablet Take 40 mg by mouth daily  Yes Unknown, Entered By History Yes    loperamide (IMODIUM) 2 MG capsule Take 1 capsule (2 mg) by mouth 4 times daily as needed for diarrhea 1/8/2024 at PM Yes Chong Bishop MD     magnesium oxide (MAG-OX) 400 MG tablet Take 1 tablet (400 mg) by mouth 2 times daily 1/9/2024 at AM Yes Alma Murphy MD     montelukast (SINGULAIR) 10 MG tablet Take 1 tablet (10 mg) by mouth every evening 1/8/2024 at PM Yes Alma Murphy MD Yes    multivitamin, therapeutic (THERA-VIT) TABS Take 1 tablet by  mouth daily 1/9/2024 at AM Yes Anson Ornelas MD     predniSONE (DELTASONE) 5 MG tablet TAKE ONE TABLET BY MOUTH ONCE DAILY IN THE MORNING AND ONE-HALF TABLET IN THE EVENING 1/9/2024 at AM Yes Alma Murphy MD     rosuvastatin (CRESTOR) 40 MG tablet Take 20 mg by mouth Every Mon, Wed, Fri Morning 1/8/2024 at AM Yes Unknown, Entered By History Yes    sildenafil (VIAGRA) 25 MG tablet Take 1 tablet (25 mg) by mouth as needed (as needed) Unknown Yes Hoa Olivarez APRN CNP Yes    tacrolimus (GENERIC) 0.5 MG capsule Take 1 capsule (0.5 mg) by mouth every evening Total dose: 2 mg in the AM and 2.5 mg in the PM 1/9/2024 at AM Yes Alma Murphy MD     tacrolimus (GENERIC) 1 MG capsule Take 2 capsules (2 mg) by mouth 2 times daily Total dose: 2 mg in AM and 2.5 mg in PM 1/9/2024 at AM Yes Alma Murphy MD     blood glucose (NO BRAND SPECIFIED) test strip Use to test blood sugar 3 times daily. Dispense as covered by insurance. Dx. Code: E11.9. Preferred brand: Contour Next.   Hoa Olivarez APRN CNP     fluorouracil (EFUDEX) 5 % external cream Apply topically daily Use once per day for 10 days on areas of scalp, forehead and face that are scaled and gritty (one month on the central forehead). Avoid eyes.   SAMMI Luz MD     insulin pen needle (32G X 4 MM) 32G X 4 MM miscellaneous Use 4 pen needles daily or as directed. Dispense as insurance allows. Dx. Code: E09.9   Hoa Olivarez APRN CNP     Lancet Devices (MICROLET NEXT LANCING DEVICE) MISC USE AS DIRECTED   Hoa Olivarez APRN CNP     Microlet Lancets MISC CHECK BLOOD SUGAR FOUR TIMES DAILY E11.9   Hoa Olivarez APRN CNP     omeprazole (PRILOSEC) 20 MG DR capsule Take 1 capsule (20 mg) by mouth 2 times daily (before meals)   Alma Murphy MD     order for DME Equipment being ordered: diabetic shoes   Hoa Olivarez, BRANDI CNP

## 2024-01-09 NOTE — TELEPHONE ENCOUNTER
Strongly agree with ED recommendations based on patient's history, current cardiac rehab status, s/p lung transplant.   Sonia Yang PA-C on 1/9/2024 at 8:54 AM

## 2024-01-09 NOTE — ED TRIAGE NOTES
"Pt presents with his wife for chest pain, states to onset of intermittent symptoms since about 6 days ago, with activity pt has been noting chest tightness and pain that resolves at rest, initially episodes were lasting less than once minute but are becoming longer in duration, denies change in intensity. No hx similar. States to on Plavix for previous DVT, following in cardiac rehab about getting improved bloodflow to the legs. Pt brought to immediately to exam room from triage and EKG ordered.    BP (!) 147/77   Pulse 90   Temp (!) 96.6  F (35.9  C) (Temporal)   Resp 18   Ht 1.727 m (5' 8\")   Wt 74.8 kg (165 lb)   SpO2 96%   BMI 25.09 kg/m         Triage Assessment (Adult)       Row Name 01/09/24 0939          Triage Assessment    Airway WDL WDL        Respiratory WDL    Respiratory WDL WDL        Skin Circulation/Temperature WDL    Skin Circulation/Temperature WDL WDL        Cardiac WDL    Cardiac WDL X;chest pain        Chest Pain Assessment    Character tightness;aching;throbbing     Precipitating Factors activity     Alleviating Factors rest     Chest Pain Intervention 12-lead ECG obtained        Peripheral/Neurovascular WDL    Peripheral Neurovascular WDL WDL        Cognitive/Neuro/Behavioral WDL    Cognitive/Neuro/Behavioral WDL WDL                     "
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
## 2024-01-09 NOTE — ED NOTES
Writer called [facility] and inquired if they have a bed for pt. Writer was told that pt is on wait list and that they are not able to guarantee that they'll for sure have a bed today. MD and charge updated.

## 2024-01-09 NOTE — TELEPHONE ENCOUNTER
Per Sonia- please triage patient. Not sure that this is clinic appropraite. Has appt today at 1120 with [facility].     Thank you     Svetlana Enciso LPN on 1/9/2024 at 8:11 AM

## 2024-01-09 NOTE — TELEPHONE ENCOUNTER
S-(situation): Patient is experiencing chest pain with exertion. Getting more frequent and lasting longer.    B-(background): first chest pain event started last Thursday, 1/4/24.     A-(assessment): has had a few separate events. When exerting himself, he has heavy chest discomfort and must take a break. Chest pain does go away but has lasted from 1-4 minutes. Denies shortness of breath, nausea, sweating. Denies recent injury to chest.      R-(recommendations): RN advised to go to ED instead of appt today. Will route to provider to review.       Reason for Disposition   Chest pain or 'angina' comes and goes and is happening more often (increasing in frequency) or getting worse (increasing in severity) (Exception: Chest pains that last only a few seconds.)    Additional Information   Negative: Patient says chest pain feels exactly the same as previously diagnosed 'heartburn' and describes burning in chest and accompanying sour taste in mouth    Protocols used: Chest Pain-A-[place]    JAZ DEL ROSARIO RN on 1/9/2024 at 8:47 AM
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
## 2024-01-10 ENCOUNTER — TELEPHONE (OUTPATIENT)
Dept: FAMILY MEDICINE | Facility: OTHER | Age: 69
End: 2024-01-10
Payer: MEDICARE

## 2024-01-10 ENCOUNTER — MYC MEDICAL ADVICE (OUTPATIENT)
Dept: OTHER | Age: 69
End: 2024-01-10

## 2024-01-10 VITALS
RESPIRATION RATE: 6 BRPM | OXYGEN SATURATION: 96 % | HEART RATE: 75 BPM | TEMPERATURE: 96.9 F | HEIGHT: 68 IN | DIASTOLIC BLOOD PRESSURE: 75 MMHG | BODY MASS INDEX: 25.01 KG/M2 | SYSTOLIC BLOOD PRESSURE: 124 MMHG | WEIGHT: 165 LBS

## 2024-01-10 DIAGNOSIS — E11.42 TYPE 2 DIABETES MELLITUS WITH DIABETIC POLYNEUROPATHY, WITH LONG-TERM CURRENT USE OF INSULIN (H): Primary | ICD-10-CM

## 2024-01-10 DIAGNOSIS — Z79.4 TYPE 2 DIABETES MELLITUS WITH DIABETIC POLYNEUROPATHY, WITH LONG-TERM CURRENT USE OF INSULIN (H): Primary | ICD-10-CM

## 2024-01-10 DIAGNOSIS — I25.10 CORONARY ATHEROSCLEROSIS: Primary | ICD-10-CM

## 2024-01-10 LAB
ANION GAP SERPL CALCULATED.3IONS-SCNC: 10 MMOL/L (ref 7–15)
BASOPHILS # BLD AUTO: 0 10E3/UL (ref 0–0.2)
BASOPHILS NFR BLD AUTO: 0 %
BUN SERPL-MCNC: 32.4 MG/DL (ref 8–23)
CALCIUM SERPL-MCNC: 9 MG/DL (ref 8.8–10.2)
CHLORIDE SERPL-SCNC: 104 MMOL/L (ref 98–107)
CREAT SERPL-MCNC: 1.15 MG/DL (ref 0.67–1.17)
DEPRECATED HCO3 PLAS-SCNC: 25 MMOL/L (ref 22–29)
EGFRCR SERPLBLD CKD-EPI 2021: 68 ML/MIN/1.73M2
EOSINOPHIL # BLD AUTO: 0.2 10E3/UL (ref 0–0.7)
EOSINOPHIL NFR BLD AUTO: 2 %
ERYTHROCYTE [DISTWIDTH] IN BLOOD BY AUTOMATED COUNT: 13.6 % (ref 10–15)
GLUCOSE BLDC GLUCOMTR-MCNC: 109 MG/DL (ref 70–99)
GLUCOSE BLDC GLUCOMTR-MCNC: 123 MG/DL (ref 70–99)
GLUCOSE BLDC GLUCOMTR-MCNC: 83 MG/DL (ref 70–99)
GLUCOSE BLDC GLUCOMTR-MCNC: 90 MG/DL (ref 70–99)
GLUCOSE BLDC GLUCOMTR-MCNC: 95 MG/DL (ref 70–99)
GLUCOSE SERPL-MCNC: 176 MG/DL (ref 70–99)
HCT VFR BLD AUTO: 35.7 % (ref 40–53)
HGB BLD-MCNC: 11.7 G/DL (ref 13.3–17.7)
HOLD SPECIMEN: NORMAL
HOLD SPECIMEN: NORMAL
IMM GRANULOCYTES # BLD: 0.1 10E3/UL
IMM GRANULOCYTES NFR BLD: 1 %
LYMPHOCYTES # BLD AUTO: 1.2 10E3/UL (ref 0.8–5.3)
LYMPHOCYTES NFR BLD AUTO: 12 %
MCH RBC QN AUTO: 34.1 PG (ref 26.5–33)
MCHC RBC AUTO-ENTMCNC: 32.8 G/DL (ref 31.5–36.5)
MCV RBC AUTO: 104 FL (ref 78–100)
MONOCYTES # BLD AUTO: 1 10E3/UL (ref 0–1.3)
MONOCYTES NFR BLD AUTO: 10 %
NEUTROPHILS # BLD AUTO: 7.5 10E3/UL (ref 1.6–8.3)
NEUTROPHILS NFR BLD AUTO: 75 %
NRBC # BLD AUTO: 0 10E3/UL
NRBC BLD AUTO-RTO: 0 /100
PLATELET # BLD AUTO: 194 10E3/UL (ref 150–450)
POTASSIUM SERPL-SCNC: 4.5 MMOL/L (ref 3.4–5.3)
RBC # BLD AUTO: 3.43 10E6/UL (ref 4.4–5.9)
SODIUM SERPL-SCNC: 139 MMOL/L (ref 135–145)
TROPONIN T SERPL HS-MCNC: 22 NG/L
WBC # BLD AUTO: 10.1 10E3/UL (ref 4–11)

## 2024-01-10 PROCEDURE — 94640 AIRWAY INHALATION TREATMENT: CPT

## 2024-01-10 PROCEDURE — 999N000157 HC STATISTIC RCP TIME EA 10 MIN

## 2024-01-10 PROCEDURE — 250N000012 HC RX MED GY IP 250 OP 636 PS 637: Performed by: FAMILY MEDICINE

## 2024-01-10 PROCEDURE — 82962 GLUCOSE BLOOD TEST: CPT

## 2024-01-10 PROCEDURE — 250N000013 HC RX MED GY IP 250 OP 250 PS 637: Performed by: FAMILY MEDICINE

## 2024-01-10 PROCEDURE — 82962 GLUCOSE BLOOD TEST: CPT | Mod: 91

## 2024-01-10 PROCEDURE — 80048 BASIC METABOLIC PNL TOTAL CA: CPT | Performed by: FAMILY MEDICINE

## 2024-01-10 PROCEDURE — 250N000013 HC RX MED GY IP 250 OP 250 PS 637: Performed by: PHYSICIAN ASSISTANT

## 2024-01-10 PROCEDURE — 36415 COLL VENOUS BLD VENIPUNCTURE: CPT | Performed by: FAMILY MEDICINE

## 2024-01-10 PROCEDURE — 84484 ASSAY OF TROPONIN QUANT: CPT | Performed by: FAMILY MEDICINE

## 2024-01-10 PROCEDURE — 250N000009 HC RX 250: Performed by: FAMILY MEDICINE

## 2024-01-10 PROCEDURE — 85025 COMPLETE CBC W/AUTO DIFF WBC: CPT | Performed by: FAMILY MEDICINE

## 2024-01-10 PROCEDURE — 250N000012 HC RX MED GY IP 250 OP 636 PS 637: Performed by: PHYSICIAN ASSISTANT

## 2024-01-10 RX ORDER — ASPIRIN 81 MG/1
81 TABLET ORAL DAILY
Status: DISCONTINUED | OUTPATIENT
Start: 2024-01-10 | End: 2024-01-11 | Stop reason: HOSPADM

## 2024-01-10 RX ORDER — ALBUTEROL SULFATE 90 UG/1
1-2 AEROSOL, METERED RESPIRATORY (INHALATION) EVERY 6 HOURS PRN
Status: DISCONTINUED | OUTPATIENT
Start: 2024-01-10 | End: 2024-01-10 | Stop reason: ALTCHOICE

## 2024-01-10 RX ORDER — AZATHIOPRINE 50 MG/1
50 TABLET ORAL DAILY
Status: DISCONTINUED | OUTPATIENT
Start: 2024-01-10 | End: 2024-01-11 | Stop reason: HOSPADM

## 2024-01-10 RX ORDER — FLUTICASONE FUROATE AND VILANTEROL 200; 25 UG/1; UG/1
1 POWDER RESPIRATORY (INHALATION) DAILY
Status: DISCONTINUED | OUTPATIENT
Start: 2024-01-10 | End: 2024-01-10

## 2024-01-10 RX ORDER — BUDESONIDE 0.5 MG/2ML
0.5 INHALANT ORAL DAILY
Status: DISCONTINUED | OUTPATIENT
Start: 2024-01-11 | End: 2024-01-11 | Stop reason: HOSPADM

## 2024-01-10 RX ORDER — NICOTINE POLACRILEX 4 MG
15-30 LOZENGE BUCCAL
Status: DISCONTINUED | OUTPATIENT
Start: 2024-01-10 | End: 2024-01-11 | Stop reason: HOSPADM

## 2024-01-10 RX ORDER — TACROLIMUS 0.5 MG/1
2 CAPSULE ORAL
Status: DISCONTINUED | OUTPATIENT
Start: 2024-01-10 | End: 2024-01-11 | Stop reason: HOSPADM

## 2024-01-10 RX ORDER — AMLODIPINE BESYLATE 5 MG/1
10 TABLET ORAL DAILY
Status: DISCONTINUED | OUTPATIENT
Start: 2024-01-10 | End: 2024-01-11 | Stop reason: HOSPADM

## 2024-01-10 RX ORDER — TACROLIMUS 0.5 MG/1
2.5 CAPSULE ORAL
Status: DISCONTINUED | OUTPATIENT
Start: 2024-01-10 | End: 2024-01-11 | Stop reason: HOSPADM

## 2024-01-10 RX ORDER — AZITHROMYCIN 250 MG/1
250 TABLET, FILM COATED ORAL
Status: DISCONTINUED | OUTPATIENT
Start: 2024-01-10 | End: 2024-01-11 | Stop reason: HOSPADM

## 2024-01-10 RX ORDER — DAPSONE 25 MG/1
50 TABLET ORAL DAILY
Status: DISCONTINUED | OUTPATIENT
Start: 2024-01-10 | End: 2024-01-11 | Stop reason: HOSPADM

## 2024-01-10 RX ORDER — PREDNISONE 5 MG/1
5 TABLET ORAL DAILY
Status: DISCONTINUED | OUTPATIENT
Start: 2024-01-10 | End: 2024-01-11 | Stop reason: HOSPADM

## 2024-01-10 RX ORDER — FLUDROCORTISONE ACETATE 0.1 MG/1
0.1 TABLET ORAL DAILY
Status: DISCONTINUED | OUTPATIENT
Start: 2024-01-10 | End: 2024-01-11 | Stop reason: HOSPADM

## 2024-01-10 RX ORDER — LISINOPRIL 40 MG/1
40 TABLET ORAL DAILY
Status: DISCONTINUED | OUTPATIENT
Start: 2024-01-10 | End: 2024-01-11 | Stop reason: HOSPADM

## 2024-01-10 RX ORDER — ALBUTEROL SULFATE 0.83 MG/ML
2.5 SOLUTION RESPIRATORY (INHALATION) EVERY 6 HOURS PRN
Status: DISCONTINUED | OUTPATIENT
Start: 2024-01-10 | End: 2024-01-11 | Stop reason: HOSPADM

## 2024-01-10 RX ORDER — FUROSEMIDE 20 MG
20 TABLET ORAL DAILY
Status: DISCONTINUED | OUTPATIENT
Start: 2024-01-10 | End: 2024-01-11 | Stop reason: HOSPADM

## 2024-01-10 RX ORDER — BUDESONIDE 0.5 MG/2ML
0.5 INHALANT ORAL ONCE
Status: COMPLETED | OUTPATIENT
Start: 2024-01-10 | End: 2024-01-10

## 2024-01-10 RX ORDER — MONTELUKAST SODIUM 5 MG/1
10 TABLET, CHEWABLE ORAL EVERY EVENING
Status: DISCONTINUED | OUTPATIENT
Start: 2024-01-10 | End: 2024-01-11 | Stop reason: HOSPADM

## 2024-01-10 RX ORDER — DEXTROSE MONOHYDRATE 25 G/50ML
25-50 INJECTION, SOLUTION INTRAVENOUS
Status: DISCONTINUED | OUTPATIENT
Start: 2024-01-10 | End: 2024-01-11 | Stop reason: HOSPADM

## 2024-01-10 RX ORDER — PANTOPRAZOLE SODIUM 40 MG/1
40 TABLET, DELAYED RELEASE ORAL
Status: DISCONTINUED | OUTPATIENT
Start: 2024-01-10 | End: 2024-01-11 | Stop reason: HOSPADM

## 2024-01-10 RX ORDER — ACETAMINOPHEN 325 MG/1
650 TABLET ORAL EVERY 6 HOURS PRN
Status: DISCONTINUED | OUTPATIENT
Start: 2024-01-10 | End: 2024-01-11 | Stop reason: HOSPADM

## 2024-01-10 RX ADMIN — AZATHIOPRINE 50 MG: 50 TABLET ORAL at 09:17

## 2024-01-10 RX ADMIN — AMLODIPINE BESYLATE 10 MG: 5 TABLET ORAL at 08:22

## 2024-01-10 RX ADMIN — PANTOPRAZOLE SODIUM 40 MG: 40 TABLET, DELAYED RELEASE ORAL at 18:01

## 2024-01-10 RX ADMIN — FLUDROCORTISONE ACETATE 0.1 MG: 0.1 TABLET ORAL at 09:17

## 2024-01-10 RX ADMIN — CARVEDILOL 6.25 MG: 6.25 TABLET, FILM COATED ORAL at 08:22

## 2024-01-10 RX ADMIN — PANTOPRAZOLE SODIUM 40 MG: 40 TABLET, DELAYED RELEASE ORAL at 08:22

## 2024-01-10 RX ADMIN — INSULIN GLARGINE 18 UNITS: 100 INJECTION, SOLUTION SUBCUTANEOUS at 09:17

## 2024-01-10 RX ADMIN — TACROLIMUS 2 MG: 0.5 CAPSULE ORAL at 08:40

## 2024-01-10 RX ADMIN — PREDNISONE 5 MG: 5 TABLET ORAL at 08:22

## 2024-01-10 RX ADMIN — AZITHROMYCIN 250 MG: 250 TABLET, FILM COATED ORAL at 09:17

## 2024-01-10 RX ADMIN — TICAGRELOR 90 MG: 90 TABLET ORAL at 11:23

## 2024-01-10 RX ADMIN — BUDESONIDE INHALATION 0.5 MG: 0.5 SUSPENSION RESPIRATORY (INHALATION) at 09:29

## 2024-01-10 RX ADMIN — ASPIRIN 81 MG: 81 TABLET, COATED ORAL at 08:22

## 2024-01-10 RX ADMIN — CARVEDILOL 6.25 MG: 6.25 TABLET, FILM COATED ORAL at 18:02

## 2024-01-10 RX ADMIN — LISINOPRIL 40 MG: 40 TABLET ORAL at 08:23

## 2024-01-10 RX ADMIN — TACROLIMUS 2.5 MG: 0.5 CAPSULE ORAL at 18:03

## 2024-01-10 RX ADMIN — FUROSEMIDE 20 MG: 20 TABLET ORAL at 09:17

## 2024-01-10 RX ADMIN — MONTELUKAST SODIUM 10 MG: 5 TABLET, CHEWABLE ORAL at 21:48

## 2024-01-10 RX ADMIN — DAPSONE 50 MG: 25 TABLET ORAL at 12:37

## 2024-01-10 RX ADMIN — TICAGRELOR 90 MG: 90 TABLET ORAL at 18:02

## 2024-01-10 ASSESSMENT — ACTIVITIES OF DAILY LIVING (ADL)
ADLS_ACUITY_SCORE: 35

## 2024-01-10 NOTE — ED PROVIDER NOTES
01/10/24   7:00 AM    I am accepting the care of this patient from Dr Ortiz at change of shift.  Aubrey Duncan is a 67 yo male  History of bilateral lung transplant  Arrived with CP  Given aspirin and Brilinta   On wait list at [facility] for angiogram    ED Course    Labs today show CBC with hgb 11.7, BMP okay, troponin 22. He has been on heparin.    7 PM  I spoke with Aubrey about this. He feels comfortable staying here because his lung transplant surgeons recommend he get this work up completed.  I am signing out his care to Dr Mae at change of shift.     Diagnosis    (R07.9) Chest pain, unspecified type    (I21.4) NSTEMI (non-ST elevated myocardial infarction) (H)        Plan: Per Dr Carmelita Roy, Aubrey Shelton MD  01/10/24 4123

## 2024-01-10 NOTE — PROGRESS NOTES
Per order note patient is coming in for labs for Dr Packer on 01/15/24. Please submit any additional lab work that may  be needed. Thanks.

## 2024-01-10 NOTE — ED NOTES
Called [facility] for update about transfer. Pt is active on waitlist at this time, no further details are known at this time.

## 2024-01-10 NOTE — ED PROVIDER NOTES
Transfer of care from previous shift provider:. Remote bilateral lung transplant on plavix/aspirin presenting with several episodes of exertional dyspnea most recently last night that this time didn't improve with rest. Reassuring evaluation except for 1 EKG with V5-6 TWI but still symptomatic. Given ASA and brilinta. Heparin allergy. No recommendation for other anticoagulant at this time. Symptoms eventually resolved. On [facility] waitlist for angiogram. Home meds being ordered.    Assessment and Plan:  Final diagnoses:   Chest pain, unspecified type   NSTEMI (non-ST elevated myocardial infarction) (H)      Very mild pain persists but manageable. Awaiting [facility] bed for angiogram. Signed out to Dr. Roy.     Sidney Ortiz MD  01/10/24 0655

## 2024-01-10 NOTE — ED NOTES
Pt ambulatory today. Not complaining of chest pain this shift. Offered a shower and moved to a room with a private bathroom. Pt understanding that the wait list is taking a while. Sugars under control.

## 2024-01-11 ENCOUNTER — APPOINTMENT (OUTPATIENT)
Dept: CARDIOLOGY | Facility: CLINIC | Age: 69
DRG: 322 | End: 2024-01-11
Payer: MEDICARE

## 2024-01-11 ENCOUNTER — HOSPITAL ENCOUNTER (INPATIENT)
Facility: CLINIC | Age: 69
LOS: 1 days | Discharge: HOME | DRG: 322 | End: 2024-01-12
Attending: INTERNAL MEDICINE | Admitting: INTERNAL MEDICINE
Payer: MEDICARE

## 2024-01-11 ENCOUNTER — APPOINTMENT (OUTPATIENT)
Dept: ULTRASOUND IMAGING | Facility: CLINIC | Age: 69
DRG: 322 | End: 2024-01-11
Attending: STUDENT IN AN ORGANIZED HEALTH CARE EDUCATION/TRAINING PROGRAM
Payer: MEDICARE

## 2024-01-11 DIAGNOSIS — I20.0 UNSTABLE ANGINA (H): Primary | ICD-10-CM

## 2024-01-11 DIAGNOSIS — I25.110 CORONARY ARTERY DISEASE INVOLVING NATIVE CORONARY ARTERY OF NATIVE HEART WITH UNSTABLE ANGINA PECTORIS (H): ICD-10-CM

## 2024-01-11 LAB
ACT BLD: 155 SECONDS (ref 74–150)
ACT BLD: 159 SECONDS (ref 74–150)
ACT BLD: 174 SECONDS (ref 74–150)
ACT BLD: 189 SECONDS (ref 74–150)
ACT BLD: 220 SECONDS (ref 74–150)
ACT BLD: 240 SECONDS (ref 74–150)
ANION GAP SERPL CALCULATED.3IONS-SCNC: 10 MMOL/L (ref 7–15)
APTT PPP: 125 SECONDS (ref 22–38)
ATRIAL RATE - MUSE: 76 BPM
ATRIAL RATE - MUSE: 77 BPM
ATRIAL RATE - MUSE: 79 BPM
BUN SERPL-MCNC: 37.1 MG/DL (ref 8–23)
CALCIUM SERPL-MCNC: 8.7 MG/DL (ref 8.8–10.2)
CHLORIDE SERPL-SCNC: 107 MMOL/L (ref 98–107)
CHOLEST SERPL-MCNC: 134 MG/DL
CMV DNA SPEC NAA+PROBE-ACNC: NOT DETECTED IU/ML
CREAT SERPL-MCNC: 1.23 MG/DL (ref 0.67–1.17)
DEPRECATED HCO3 PLAS-SCNC: 23 MMOL/L (ref 22–29)
DIASTOLIC BLOOD PRESSURE - MUSE: NORMAL MMHG
EGFRCR SERPLBLD CKD-EPI 2021: 63 ML/MIN/1.73M2
ERYTHROCYTE [DISTWIDTH] IN BLOOD BY AUTOMATED COUNT: 13.6 % (ref 10–15)
GLUCOSE BLDC GLUCOMTR-MCNC: 108 MG/DL (ref 70–99)
GLUCOSE BLDC GLUCOMTR-MCNC: 130 MG/DL (ref 70–99)
GLUCOSE BLDC GLUCOMTR-MCNC: 197 MG/DL (ref 70–99)
GLUCOSE BLDC GLUCOMTR-MCNC: 84 MG/DL (ref 70–99)
GLUCOSE BLDC GLUCOMTR-MCNC: 89 MG/DL (ref 70–99)
GLUCOSE SERPL-MCNC: 100 MG/DL (ref 70–99)
HBA1C MFR BLD: 4.3 %
HCT VFR BLD AUTO: 35.5 % (ref 40–53)
HDLC SERPL-MCNC: 38 MG/DL
HGB BLD-MCNC: 11.3 G/DL (ref 13.3–17.7)
HOLD SPECIMEN: NORMAL
INTERPRETATION ECG - MUSE: NORMAL
LACTATE SERPL-SCNC: 1 MMOL/L (ref 0.7–2)
LDLC SERPL CALC-MCNC: 60 MG/DL
LVEF ECHO: NORMAL
MCH RBC QN AUTO: 33.4 PG (ref 26.5–33)
MCHC RBC AUTO-ENTMCNC: 31.8 G/DL (ref 31.5–36.5)
MCV RBC AUTO: 105 FL (ref 78–100)
NONHDLC SERPL-MCNC: 96 MG/DL
P AXIS - MUSE: 20 DEGREES
P AXIS - MUSE: 21 DEGREES
P AXIS - MUSE: 26 DEGREES
PLATELET # BLD AUTO: 174 10E3/UL (ref 150–450)
POTASSIUM SERPL-SCNC: 3.9 MMOL/L (ref 3.4–5.3)
PR INTERVAL - MUSE: 168 MS
PR INTERVAL - MUSE: 168 MS
PR INTERVAL - MUSE: 174 MS
QRS DURATION - MUSE: 86 MS
QRS DURATION - MUSE: 88 MS
QRS DURATION - MUSE: 88 MS
QT - MUSE: 396 MS
QT - MUSE: 402 MS
QT - MUSE: 432 MS
QTC - MUSE: 454 MS
QTC - MUSE: 454 MS
QTC - MUSE: 486 MS
R AXIS - MUSE: -23 DEGREES
R AXIS - MUSE: -24 DEGREES
R AXIS - MUSE: -26 DEGREES
RBC # BLD AUTO: 3.38 10E6/UL (ref 4.4–5.9)
SARS-COV-2 RNA RESP QL NAA+PROBE: NEGATIVE
SODIUM SERPL-SCNC: 140 MMOL/L (ref 135–145)
SYSTOLIC BLOOD PRESSURE - MUSE: NORMAL MMHG
T AXIS - MUSE: 94 DEGREES
T AXIS - MUSE: 96 DEGREES
T AXIS - MUSE: 97 DEGREES
TACROLIMUS BLD-MCNC: 10.8 UG/L (ref 5–15)
TME LAST DOSE: NORMAL H
TME LAST DOSE: NORMAL H
TRIGL SERPL-MCNC: 179 MG/DL
TROPONIN T SERPL HS-MCNC: 23 NG/L
VENTRICULAR RATE- MUSE: 76 BPM
VENTRICULAR RATE- MUSE: 77 BPM
VENTRICULAR RATE- MUSE: 79 BPM
WBC # BLD AUTO: 10.7 10E3/UL (ref 4–11)

## 2024-01-11 PROCEDURE — 250N000011 HC RX IP 250 OP 636

## 2024-01-11 PROCEDURE — 250N000011 HC RX IP 250 OP 636: Performed by: INTERNAL MEDICINE

## 2024-01-11 PROCEDURE — C1725 CATH, TRANSLUMIN NON-LASER: HCPCS | Performed by: INTERNAL MEDICINE

## 2024-01-11 PROCEDURE — C1894 INTRO/SHEATH, NON-LASER: HCPCS | Performed by: INTERNAL MEDICINE

## 2024-01-11 PROCEDURE — B2111ZZ FLUOROSCOPY OF MULTIPLE CORONARY ARTERIES USING LOW OSMOLAR CONTRAST: ICD-10-PCS | Performed by: INTERNAL MEDICINE

## 2024-01-11 PROCEDURE — C9600 PERC DRUG-EL COR STENT SING: HCPCS | Performed by: INTERNAL MEDICINE

## 2024-01-11 PROCEDURE — 250N000012 HC RX MED GY IP 250 OP 636 PS 637

## 2024-01-11 PROCEDURE — 80197 ASSAY OF TACROLIMUS: CPT

## 2024-01-11 PROCEDURE — 93306 TTE W/DOPPLER COMPLETE: CPT | Mod: 26 | Performed by: INTERNAL MEDICINE

## 2024-01-11 PROCEDURE — 93454 CORONARY ARTERY ANGIO S&I: CPT | Performed by: INTERNAL MEDICINE

## 2024-01-11 PROCEDURE — 85027 COMPLETE CBC AUTOMATED: CPT

## 2024-01-11 PROCEDURE — 93799 UNLISTED CV SVC/PROCEDURE: CPT | Performed by: INTERNAL MEDICINE

## 2024-01-11 PROCEDURE — 258N000003 HC RX IP 258 OP 636: Performed by: INTERNAL MEDICINE

## 2024-01-11 PROCEDURE — 80048 BASIC METABOLIC PNL TOTAL CA: CPT

## 2024-01-11 PROCEDURE — 99152 MOD SED SAME PHYS/QHP 5/>YRS: CPT | Mod: GC | Performed by: INTERNAL MEDICINE

## 2024-01-11 PROCEDURE — 36415 COLL VENOUS BLD VENIPUNCTURE: CPT

## 2024-01-11 PROCEDURE — 93926 LOWER EXTREMITY STUDY: CPT | Mod: 26 | Performed by: RADIOLOGY

## 2024-01-11 PROCEDURE — C1874 STENT, COATED/COV W/DEL SYS: HCPCS | Performed by: INTERNAL MEDICINE

## 2024-01-11 PROCEDURE — 250N000013 HC RX MED GY IP 250 OP 250 PS 637: Performed by: INTERNAL MEDICINE

## 2024-01-11 PROCEDURE — 93454 CORONARY ARTERY ANGIO S&I: CPT | Mod: 26 | Performed by: INTERNAL MEDICINE

## 2024-01-11 PROCEDURE — 36415 COLL VENOUS BLD VENIPUNCTURE: CPT | Performed by: INTERNAL MEDICINE

## 2024-01-11 PROCEDURE — 250N000011 HC RX IP 250 OP 636: Mod: JZ | Performed by: INTERNAL MEDICINE

## 2024-01-11 PROCEDURE — 85347 COAGULATION TIME ACTIVATED: CPT

## 2024-01-11 PROCEDURE — 83605 ASSAY OF LACTIC ACID: CPT

## 2024-01-11 PROCEDURE — 99207 PR NO BILLABLE SERVICE THIS VISIT: CPT | Performed by: PHYSICIAN ASSISTANT

## 2024-01-11 PROCEDURE — 92978 ENDOLUMINL IVUS OCT C 1ST: CPT | Mod: 26 | Performed by: INTERNAL MEDICINE

## 2024-01-11 PROCEDURE — 99152 MOD SED SAME PHYS/QHP 5/>YRS: CPT | Performed by: INTERNAL MEDICINE

## 2024-01-11 PROCEDURE — 92928 PRQ TCAT PLMT NTRAC ST 1 LES: CPT | Mod: LD | Performed by: INTERNAL MEDICINE

## 2024-01-11 PROCEDURE — 99233 SBSQ HOSP IP/OBS HIGH 50: CPT | Mod: 24

## 2024-01-11 PROCEDURE — 250N000013 HC RX MED GY IP 250 OP 250 PS 637

## 2024-01-11 PROCEDURE — 250N000013 HC RX MED GY IP 250 OP 250 PS 637: Performed by: STUDENT IN AN ORGANIZED HEALTH CARE EDUCATION/TRAINING PROGRAM

## 2024-01-11 PROCEDURE — 99221 1ST HOSP IP/OBS SF/LOW 40: CPT | Mod: FS | Performed by: PHYSICIAN ASSISTANT

## 2024-01-11 PROCEDURE — 87635 SARS-COV-2 COVID-19 AMP PRB: CPT

## 2024-01-11 PROCEDURE — 999N000054 HC STATISTIC EKG NON-CHARGEABLE

## 2024-01-11 PROCEDURE — 93010 ELECTROCARDIOGRAM REPORT: CPT | Mod: XU | Performed by: INTERNAL MEDICINE

## 2024-01-11 PROCEDURE — C1769 GUIDE WIRE: HCPCS | Performed by: INTERNAL MEDICINE

## 2024-01-11 PROCEDURE — 93571 IV DOP VEL&/PRESS C FLO 1ST: CPT | Mod: 26 | Performed by: INTERNAL MEDICINE

## 2024-01-11 PROCEDURE — 250N000012 HC RX MED GY IP 250 OP 636 PS 637: Performed by: PHYSICIAN ASSISTANT

## 2024-01-11 PROCEDURE — 93971 EXTREMITY STUDY: CPT

## 2024-01-11 PROCEDURE — 82962 GLUCOSE BLOOD TEST: CPT

## 2024-01-11 PROCEDURE — 92978 ENDOLUMINL IVUS OCT C 1ST: CPT | Mod: LD | Performed by: INTERNAL MEDICINE

## 2024-01-11 PROCEDURE — 255N000002 HC RX 255 OP 636: Performed by: INTERNAL MEDICINE

## 2024-01-11 PROCEDURE — 84484 ASSAY OF TROPONIN QUANT: CPT

## 2024-01-11 PROCEDURE — 120N000003 HC R&B IMCU UMMC

## 2024-01-11 PROCEDURE — 027034Z DILATION OF CORONARY ARTERY, ONE ARTERY WITH DRUG-ELUTING INTRALUMINAL DEVICE, PERCUTANEOUS APPROACH: ICD-10-PCS | Performed by: INTERNAL MEDICINE

## 2024-01-11 PROCEDURE — C1887 CATHETER, GUIDING: HCPCS | Performed by: INTERNAL MEDICINE

## 2024-01-11 PROCEDURE — 83036 HEMOGLOBIN GLYCOSYLATED A1C: CPT

## 2024-01-11 PROCEDURE — 250N000009 HC RX 250: Performed by: INTERNAL MEDICINE

## 2024-01-11 PROCEDURE — 93971 EXTREMITY STUDY: CPT | Mod: 26 | Performed by: RADIOLOGY

## 2024-01-11 PROCEDURE — C1753 CATH, INTRAVAS ULTRASOUND: HCPCS | Performed by: INTERNAL MEDICINE

## 2024-01-11 PROCEDURE — 99153 MOD SED SAME PHYS/QHP EA: CPT | Performed by: INTERNAL MEDICINE

## 2024-01-11 PROCEDURE — 93005 ELECTROCARDIOGRAM TRACING: CPT

## 2024-01-11 PROCEDURE — 85730 THROMBOPLASTIN TIME PARTIAL: CPT | Performed by: INTERNAL MEDICINE

## 2024-01-11 PROCEDURE — 999N000208 ECHOCARDIOGRAM COMPLETE

## 2024-01-11 PROCEDURE — 272N000001 HC OR GENERAL SUPPLY STERILE: Performed by: INTERNAL MEDICINE

## 2024-01-11 PROCEDURE — 82465 ASSAY BLD/SERUM CHOLESTEROL: CPT

## 2024-01-11 PROCEDURE — 36415 COLL VENOUS BLD VENIPUNCTURE: CPT | Performed by: PHYSICIAN ASSISTANT

## 2024-01-11 PROCEDURE — 93571 IV DOP VEL&/PRESS C FLO 1ST: CPT | Performed by: INTERNAL MEDICINE

## 2024-01-11 PROCEDURE — 250N000011 HC RX IP 250 OP 636: Performed by: STUDENT IN AN ORGANIZED HEALTH CARE EDUCATION/TRAINING PROGRAM

## 2024-01-11 DEVICE — STENT CORONARY DES SYNERGY XD MR US 3.50X24MM H7493941824350: Type: IMPLANTABLE DEVICE | Status: FUNCTIONAL

## 2024-01-11 RX ORDER — ATROPINE SULFATE 0.1 MG/ML
0.5 INJECTION INTRAVENOUS
Status: ACTIVE | OUTPATIENT
Start: 2024-01-11 | End: 2024-01-12

## 2024-01-11 RX ORDER — TACROLIMUS 1 MG/1
2 CAPSULE ORAL EVERY MORNING
Status: DISCONTINUED | OUTPATIENT
Start: 2024-01-11 | End: 2024-01-11

## 2024-01-11 RX ORDER — FLUMAZENIL 0.1 MG/ML
0.2 INJECTION, SOLUTION INTRAVENOUS
Status: ACTIVE | OUTPATIENT
Start: 2024-01-11 | End: 2024-01-12

## 2024-01-11 RX ORDER — HYDROMORPHONE HYDROCHLORIDE 1 MG/ML
1 INJECTION, SOLUTION INTRAMUSCULAR; INTRAVENOUS; SUBCUTANEOUS ONCE
Status: COMPLETED | OUTPATIENT
Start: 2024-01-11 | End: 2024-01-11

## 2024-01-11 RX ORDER — FLUOROURACIL 50 MG/G
CREAM TOPICAL DAILY
Status: DISCONTINUED | OUTPATIENT
Start: 2024-01-11 | End: 2024-01-11

## 2024-01-11 RX ORDER — TACROLIMUS 1 MG/1
2 CAPSULE ORAL EVERY MORNING
Status: DISCONTINUED | OUTPATIENT
Start: 2024-01-11 | End: 2024-01-12 | Stop reason: HOSPADM

## 2024-01-11 RX ORDER — ONDANSETRON 2 MG/ML
4 INJECTION INTRAMUSCULAR; INTRAVENOUS EVERY 6 HOURS PRN
Status: DISCONTINUED | OUTPATIENT
Start: 2024-01-11 | End: 2024-01-12 | Stop reason: HOSPADM

## 2024-01-11 RX ORDER — LIDOCAINE 40 MG/G
CREAM TOPICAL
Status: DISCONTINUED | OUTPATIENT
Start: 2024-01-11 | End: 2024-01-12 | Stop reason: HOSPADM

## 2024-01-11 RX ORDER — CARVEDILOL 3.12 MG/1
6.25 TABLET ORAL 2 TIMES DAILY WITH MEALS
Status: DISCONTINUED | OUTPATIENT
Start: 2024-01-11 | End: 2024-01-12 | Stop reason: HOSPADM

## 2024-01-11 RX ORDER — FLUTICASONE FUROATE AND VILANTEROL 100; 25 UG/1; UG/1
1 POWDER RESPIRATORY (INHALATION) DAILY
Status: DISCONTINUED | OUTPATIENT
Start: 2024-01-11 | End: 2024-01-12 | Stop reason: HOSPADM

## 2024-01-11 RX ORDER — NITROGLYCERIN 0.4 MG/1
0.4 TABLET SUBLINGUAL EVERY 5 MIN PRN
Status: DISCONTINUED | OUTPATIENT
Start: 2024-01-11 | End: 2024-01-12 | Stop reason: HOSPADM

## 2024-01-11 RX ORDER — NICOTINE POLACRILEX 4 MG
15-30 LOZENGE BUCCAL
Status: DISCONTINUED | OUTPATIENT
Start: 2024-01-11 | End: 2024-01-12 | Stop reason: HOSPADM

## 2024-01-11 RX ORDER — ONDANSETRON 4 MG/1
4 TABLET, ORALLY DISINTEGRATING ORAL EVERY 6 HOURS PRN
Status: DISCONTINUED | OUTPATIENT
Start: 2024-01-11 | End: 2024-01-11

## 2024-01-11 RX ORDER — NALOXONE HYDROCHLORIDE 0.4 MG/ML
0.4 INJECTION, SOLUTION INTRAMUSCULAR; INTRAVENOUS; SUBCUTANEOUS
Status: ACTIVE | OUTPATIENT
Start: 2024-01-11 | End: 2024-01-12

## 2024-01-11 RX ORDER — NITROGLYCERIN 5 MG/ML
VIAL (ML) INTRAVENOUS
Status: DISCONTINUED | OUTPATIENT
Start: 2024-01-11 | End: 2024-01-11 | Stop reason: HOSPADM

## 2024-01-11 RX ORDER — DEXTROSE MONOHYDRATE 25 G/50ML
25-50 INJECTION, SOLUTION INTRAVENOUS
Status: DISCONTINUED | OUTPATIENT
Start: 2024-01-11 | End: 2024-01-12 | Stop reason: HOSPADM

## 2024-01-11 RX ORDER — PREDNISONE 5 MG/1
5 TABLET ORAL EVERY MORNING
Status: DISCONTINUED | OUTPATIENT
Start: 2024-01-11 | End: 2024-01-12 | Stop reason: HOSPADM

## 2024-01-11 RX ORDER — FENTANYL CITRATE 50 UG/ML
INJECTION, SOLUTION INTRAMUSCULAR; INTRAVENOUS
Status: DISCONTINUED | OUTPATIENT
Start: 2024-01-11 | End: 2024-01-11 | Stop reason: HOSPADM

## 2024-01-11 RX ORDER — HYDRALAZINE HYDROCHLORIDE 20 MG/ML
10 INJECTION INTRAMUSCULAR; INTRAVENOUS EVERY 4 HOURS PRN
Status: DISCONTINUED | OUTPATIENT
Start: 2024-01-11 | End: 2024-01-12 | Stop reason: HOSPADM

## 2024-01-11 RX ORDER — MULTIVITAMIN,THERAPEUTIC
1 TABLET ORAL DAILY
Status: DISCONTINUED | OUTPATIENT
Start: 2024-01-11 | End: 2024-01-12 | Stop reason: HOSPADM

## 2024-01-11 RX ORDER — FUROSEMIDE 20 MG
20 TABLET ORAL DAILY
Status: DISCONTINUED | OUTPATIENT
Start: 2024-01-11 | End: 2024-01-12 | Stop reason: HOSPADM

## 2024-01-11 RX ORDER — CLOPIDOGREL BISULFATE 75 MG/1
75 TABLET ORAL DAILY
Status: DISCONTINUED | OUTPATIENT
Start: 2024-01-12 | End: 2024-01-12 | Stop reason: HOSPADM

## 2024-01-11 RX ORDER — ASPIRIN 81 MG/1
81 TABLET ORAL DAILY
Status: DISCONTINUED | OUTPATIENT
Start: 2024-01-12 | End: 2024-01-12 | Stop reason: HOSPADM

## 2024-01-11 RX ORDER — NALOXONE HYDROCHLORIDE 0.4 MG/ML
0.2 INJECTION, SOLUTION INTRAMUSCULAR; INTRAVENOUS; SUBCUTANEOUS
Status: ACTIVE | OUTPATIENT
Start: 2024-01-11 | End: 2024-01-12

## 2024-01-11 RX ORDER — ALBUTEROL SULFATE 0.83 MG/ML
SOLUTION RESPIRATORY (INHALATION)
Status: DISCONTINUED
Start: 2024-01-11 | End: 2024-01-11 | Stop reason: WASHOUT

## 2024-01-11 RX ORDER — ONDANSETRON 2 MG/ML
4 INJECTION INTRAMUSCULAR; INTRAVENOUS EVERY 6 HOURS PRN
Status: DISCONTINUED | OUTPATIENT
Start: 2024-01-11 | End: 2024-01-11

## 2024-01-11 RX ORDER — IOPAMIDOL 755 MG/ML
INJECTION, SOLUTION INTRAVASCULAR
Status: DISCONTINUED | OUTPATIENT
Start: 2024-01-11 | End: 2024-01-11 | Stop reason: HOSPADM

## 2024-01-11 RX ORDER — POLYETHYLENE GLYCOL 3350 17 G/17G
17 POWDER, FOR SOLUTION ORAL DAILY PRN
Status: DISCONTINUED | OUTPATIENT
Start: 2024-01-11 | End: 2024-01-12 | Stop reason: HOSPADM

## 2024-01-11 RX ORDER — METHOCARBAMOL 750 MG/1
750 TABLET, FILM COATED ORAL ONCE
Status: COMPLETED | OUTPATIENT
Start: 2024-01-11 | End: 2024-01-11

## 2024-01-11 RX ORDER — AMLODIPINE BESYLATE 5 MG/1
5 TABLET ORAL DAILY
Status: DISCONTINUED | OUTPATIENT
Start: 2024-01-11 | End: 2024-01-11

## 2024-01-11 RX ORDER — LISINOPRIL 40 MG/1
40 TABLET ORAL DAILY
Status: DISCONTINUED | OUTPATIENT
Start: 2024-01-11 | End: 2024-01-12 | Stop reason: HOSPADM

## 2024-01-11 RX ORDER — FENTANYL CITRATE 50 UG/ML
25 INJECTION, SOLUTION INTRAMUSCULAR; INTRAVENOUS
Status: DISCONTINUED | OUTPATIENT
Start: 2024-01-11 | End: 2024-01-12 | Stop reason: HOSPADM

## 2024-01-11 RX ORDER — MONTELUKAST SODIUM 10 MG/1
10 TABLET ORAL EVERY EVENING
Status: DISCONTINUED | OUTPATIENT
Start: 2024-01-11 | End: 2024-01-12 | Stop reason: HOSPADM

## 2024-01-11 RX ORDER — ONDANSETRON 4 MG/1
4 TABLET, ORALLY DISINTEGRATING ORAL EVERY 6 HOURS PRN
Status: DISCONTINUED | OUTPATIENT
Start: 2024-01-11 | End: 2024-01-12 | Stop reason: HOSPADM

## 2024-01-11 RX ORDER — ROSUVASTATIN CALCIUM 5 MG/1
20 TABLET, COATED ORAL
Status: DISCONTINUED | OUTPATIENT
Start: 2024-01-12 | End: 2024-01-12 | Stop reason: HOSPADM

## 2024-01-11 RX ORDER — ASPIRIN 81 MG/1
81 TABLET ORAL DAILY
Status: DISCONTINUED | OUTPATIENT
Start: 2024-01-11 | End: 2024-01-11

## 2024-01-11 RX ORDER — SODIUM CHLORIDE 9 MG/ML
INJECTION, SOLUTION INTRAVENOUS CONTINUOUS
Status: ACTIVE | OUTPATIENT
Start: 2024-01-11 | End: 2024-01-11

## 2024-01-11 RX ORDER — SENNOSIDES 8.6 MG
8.6 TABLET ORAL 2 TIMES DAILY PRN
Status: DISCONTINUED | OUTPATIENT
Start: 2024-01-11 | End: 2024-01-12 | Stop reason: HOSPADM

## 2024-01-11 RX ORDER — PREDNISONE 2.5 MG/1
2.5 TABLET ORAL EVERY EVENING
Status: DISCONTINUED | OUTPATIENT
Start: 2024-01-11 | End: 2024-01-12 | Stop reason: HOSPADM

## 2024-01-11 RX ORDER — AZITHROMYCIN 250 MG/1
250 TABLET, FILM COATED ORAL
Status: DISCONTINUED | OUTPATIENT
Start: 2024-01-12 | End: 2024-01-12 | Stop reason: HOSPADM

## 2024-01-11 RX ORDER — HYDROMORPHONE HYDROCHLORIDE 1 MG/ML
0.5 INJECTION, SOLUTION INTRAMUSCULAR; INTRAVENOUS; SUBCUTANEOUS ONCE
Status: COMPLETED | OUTPATIENT
Start: 2024-01-11 | End: 2024-01-11

## 2024-01-11 RX ORDER — ASPIRIN 81 MG/1
81 TABLET, CHEWABLE ORAL DAILY
Qty: 30 TABLET | Refills: 3 | Status: SHIPPED | OUTPATIENT
Start: 2024-01-11 | End: 2024-01-12

## 2024-01-11 RX ORDER — AZATHIOPRINE 50 MG/1
50 TABLET ORAL DAILY
Status: DISCONTINUED | OUTPATIENT
Start: 2024-01-11 | End: 2024-01-12 | Stop reason: HOSPADM

## 2024-01-11 RX ORDER — MAGNESIUM OXIDE 400 MG/1
400 TABLET ORAL 2 TIMES DAILY
Status: DISCONTINUED | OUTPATIENT
Start: 2024-01-11 | End: 2024-01-12 | Stop reason: HOSPADM

## 2024-01-11 RX ORDER — FLUDROCORTISONE ACETATE 0.1 MG/1
0.1 TABLET ORAL DAILY
Status: DISCONTINUED | OUTPATIENT
Start: 2024-01-11 | End: 2024-01-12 | Stop reason: HOSPADM

## 2024-01-11 RX ORDER — AMLODIPINE BESYLATE 10 MG/1
10 TABLET ORAL DAILY
Status: DISCONTINUED | OUTPATIENT
Start: 2024-01-12 | End: 2024-01-12 | Stop reason: HOSPADM

## 2024-01-11 RX ORDER — PANTOPRAZOLE SODIUM 40 MG/1
40 TABLET, DELAYED RELEASE ORAL
Status: DISCONTINUED | OUTPATIENT
Start: 2024-01-11 | End: 2024-01-12 | Stop reason: HOSPADM

## 2024-01-11 RX ORDER — ASPIRIN 81 MG/1
81 TABLET, CHEWABLE ORAL ONCE
Status: DISCONTINUED | OUTPATIENT
Start: 2024-01-11 | End: 2024-01-11

## 2024-01-11 RX ORDER — ALBUTEROL SULFATE 90 UG/1
1-2 AEROSOL, METERED RESPIRATORY (INHALATION) EVERY 6 HOURS PRN
Status: DISCONTINUED | OUTPATIENT
Start: 2024-01-11 | End: 2024-01-12 | Stop reason: HOSPADM

## 2024-01-11 RX ORDER — ACETAMINOPHEN 325 MG/1
650 TABLET ORAL EVERY 6 HOURS PRN
Status: DISCONTINUED | OUTPATIENT
Start: 2024-01-11 | End: 2024-01-12 | Stop reason: HOSPADM

## 2024-01-11 RX ORDER — METOPROLOL TARTRATE 1 MG/ML
5 INJECTION, SOLUTION INTRAVENOUS
Status: DISCONTINUED | OUTPATIENT
Start: 2024-01-11 | End: 2024-01-12 | Stop reason: HOSPADM

## 2024-01-11 RX ORDER — TACROLIMUS 1 MG/1
2 CAPSULE ORAL EVERY MORNING
Status: DISCONTINUED | OUTPATIENT
Start: 2024-01-12 | End: 2024-01-11

## 2024-01-11 RX ORDER — AMLODIPINE BESYLATE 10 MG/1
10 TABLET ORAL DAILY
Status: DISCONTINUED | OUTPATIENT
Start: 2024-01-11 | End: 2024-01-11

## 2024-01-11 RX ORDER — DAPSONE 25 MG/1
50 TABLET ORAL DAILY
Status: DISCONTINUED | OUTPATIENT
Start: 2024-01-11 | End: 2024-01-12 | Stop reason: HOSPADM

## 2024-01-11 RX ADMIN — PANTOPRAZOLE SODIUM 40 MG: 40 TABLET, DELAYED RELEASE ORAL at 09:46

## 2024-01-11 RX ADMIN — DAPSONE 50 MG: 25 TABLET ORAL at 09:53

## 2024-01-11 RX ADMIN — TACROLIMUS 2 MG: 1 CAPSULE ORAL at 09:45

## 2024-01-11 RX ADMIN — MONTELUKAST 10 MG: 10 TABLET, FILM COATED ORAL at 19:57

## 2024-01-11 RX ADMIN — THERA TABS 1 TABLET: TAB at 09:44

## 2024-01-11 RX ADMIN — PREDNISONE 2.5 MG: 2.5 TABLET ORAL at 19:57

## 2024-01-11 RX ADMIN — BIVALIRUDIN 1.75 MG/KG/HR: 250 INJECTION INTRACAVERNOUS at 14:10

## 2024-01-11 RX ADMIN — CARVEDILOL 6.25 MG: 3.12 TABLET, FILM COATED ORAL at 17:56

## 2024-01-11 RX ADMIN — PANTOPRAZOLE SODIUM 40 MG: 40 TABLET, DELAYED RELEASE ORAL at 17:56

## 2024-01-11 RX ADMIN — PREDNISONE 5 MG: 5 TABLET ORAL at 09:45

## 2024-01-11 RX ADMIN — Medication 1 TABLET: at 09:45

## 2024-01-11 RX ADMIN — AZATHIOPRINE 50 MG: 50 TABLET ORAL at 09:44

## 2024-01-11 RX ADMIN — TICAGRELOR 90 MG: 90 TABLET ORAL at 10:50

## 2024-01-11 RX ADMIN — MAGNESIUM OXIDE TAB 400 MG (241.3 MG ELEMENTAL MG) 400 MG: 400 (241.3 MG) TAB at 09:44

## 2024-01-11 RX ADMIN — CARVEDILOL 6.25 MG: 3.12 TABLET, FILM COATED ORAL at 09:43

## 2024-01-11 RX ADMIN — ACETAMINOPHEN 650 MG: 325 TABLET, FILM COATED ORAL at 17:55

## 2024-01-11 RX ADMIN — FLUDROCORTISONE ACETATE 0.1 MG: 0.1 TABLET ORAL at 09:44

## 2024-01-11 RX ADMIN — FLUTICASONE FUROATE AND VILANTEROL TRIFENATATE 1 PUFF: 100; 25 POWDER RESPIRATORY (INHALATION) at 12:49

## 2024-01-11 RX ADMIN — METHOCARBAMOL 750 MG: 750 TABLET ORAL at 18:07

## 2024-01-11 RX ADMIN — INSULIN ASPART 2 UNITS: 100 INJECTION, SOLUTION INTRAVENOUS; SUBCUTANEOUS at 21:36

## 2024-01-11 RX ADMIN — MAGNESIUM OXIDE TAB 400 MG (241.3 MG ELEMENTAL MG) 400 MG: 400 (241.3 MG) TAB at 19:57

## 2024-01-11 RX ADMIN — TICAGRELOR 90 MG: 90 TABLET ORAL at 19:57

## 2024-01-11 RX ADMIN — TACROLIMUS 2.5 MG: 1 CAPSULE ORAL at 17:56

## 2024-01-11 RX ADMIN — Medication 22 MG: at 14:31

## 2024-01-11 RX ADMIN — Medication 1 TABLET: at 17:56

## 2024-01-11 RX ADMIN — HYDROMORPHONE HYDROCHLORIDE 0.5 MG: 1 INJECTION, SOLUTION INTRAMUSCULAR; INTRAVENOUS; SUBCUTANEOUS at 22:05

## 2024-01-11 RX ADMIN — HYDROMORPHONE HYDROCHLORIDE 1 MG: 1 INJECTION, SOLUTION INTRAMUSCULAR; INTRAVENOUS; SUBCUTANEOUS at 19:56

## 2024-01-11 RX ADMIN — AMLODIPINE BESYLATE 5 MG: 5 TABLET ORAL at 09:46

## 2024-01-11 RX ADMIN — Medication 55.2 MG: at 14:10

## 2024-01-11 RX ADMIN — HUMAN ALBUMIN MICROSPHERES AND PERFLUTREN 6 ML: 10; .22 INJECTION, SOLUTION INTRAVENOUS at 09:49

## 2024-01-11 RX ADMIN — ASPIRIN 81 MG: 81 TABLET, COATED ORAL at 09:45

## 2024-01-11 RX ADMIN — LISINOPRIL 40 MG: 40 TABLET ORAL at 09:45

## 2024-01-11 RX ADMIN — SENNOSIDES 8.6 MG: 8.6 TABLET, FILM COATED ORAL at 10:49

## 2024-01-11 ASSESSMENT — ACTIVITIES OF DAILY LIVING (ADL)
CHANGE_IN_FUNCTIONAL_STATUS_SINCE_ONSET_OF_CURRENT_ILLNESS/INJURY: NO
ADLS_ACUITY_SCORE: 33
FALL_HISTORY_WITHIN_LAST_SIX_MONTHS: NO
ADLS_ACUITY_SCORE: 22
TOILETING_ISSUES: NO
DRESSING/BATHING_DIFFICULTY: NO
ADLS_ACUITY_SCORE: 22
DIFFICULTY_EATING/SWALLOWING: NO
ADLS_ACUITY_SCORE: 22
DOING_ERRANDS_INDEPENDENTLY_DIFFICULTY: NO
WALKING_OR_CLIMBING_STAIRS_DIFFICULTY: NO
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
## 2024-01-11 NOTE — Clinical Note
The first balloon was inserted into the left anterior descending and middle left anterior descending.Max pressure = 14 jen. Total duration = 10 seconds.

## 2024-01-11 NOTE — ED PROVIDER NOTES
Emergency Department Transfer of Care Note    Assessment / Plan / Medical Decision Making   Agree with previous provider's plan and exam.     Transfer of Care Plan:  68-year-old gentleman status post bilateral lung transplant 10 years ago presents with chest pain.  Slight troponin elevation which is normalized.  Patient's surgeons recommending admission for complete workup.  Awaiting transfer at this time to [facility] for angiogram. Remains stable.      New Prescriptions    No medications on file       Final diagnoses:   Chest pain, unspecified type   NSTEMI (non-ST elevated myocardial infarction) (H)       Andrews Mae MD  1/9/2024   [facility]     Andrews Mae MD  01/10/24 1910

## 2024-01-11 NOTE — PROGRESS NOTES
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    Interval History:  Patient reports no new episodes of chest pain during admission, no dizziness or SOB. Discussed workup and plan for cath with patient. Patient agreeable and consented to catheterization with possible PCI.     Changes Today:  - Echo completed, unchanged from previous in 2012  - Cath lab for angio and possible PCI    # Unstable Angina  # HLD  # HTN  # PAD  # LE Edema  Pt reported left sided/substernal chest pain that started last Thursday 1/4 at PT, brought on by exertion, then resolving after a 1-2 min of rest. Pt had second episode on Saturday while shoveling snow that he states lasted longer and felt stronger. On the day of presentation he developed a similar episode of chest pain, however, it occurred with rest and lasted a couple hours. He presented to the ED. Troponin 22->23, EKG without ischemic changes. Patient's CP eventually subsided without intervention. Received ASA and Brilinta load.   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 Rodriguez (pt was on Plavix PTA)  - Continue PTA Coreg 6.25 mg BID  - Continue PTA amlodipine 10 mg daily  - Continue PTA lisinopril 40 mg daily  - Continue PTA Crestor 20 mg every MWF  - PTA Lasix held for bump in creatinine/BUN  - Following with outpatient PT/rehab for PAD  - Lipid panel, A1c pending  - Telemetry  -  Daily CMP, Mag  - Electrolyte replacement protocols    # A1AT deficiency s/p bilateral lung transplants (2013)  # 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  # Recurrent CMV viremia  - Transplant Pulmonology consulted, appreciate recs  - Immunosuppression:  - Continue PTA azathioprine 50 mg daily  - Continue PTA prednisone 7.5 mg daily  - Continue PTA tacrolimus 2 mg QAM & 2.5 mg QPM (goal 8-10)              - Tac trough this AM was 10.8   - Infection prophylaxis:  - Continue PTA azithromycin 250 mg MWF  - Continue PTA dapsone 50 mg daily  - Continue PTA pulmicort neb   - Continue PTA montelukast QHS  - Continue PTA florinef 0.1 mg daily (indication CNI-related hyperkalemia)  - Continue PTA magnesium oxide 400 mg BID    # 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 Last visit Aug 2023. Previously on eliquis but stopped due to cost. Not felt to have a strong indication for fondaparinux or warfarin. Was continued on aspirin & plavix. Switched plavix to brilinta in setting of ischemic workup.  - Continue PTA aspirin 81 mg daily  - Continue PTA Brilinta 90 mg BID    # Steroid-induced DM  - Hold PTA insulin glargine 18 U QAM while NPO  - Hold PTA insulin aspart 5 U QAM, 3 U at noon, 5 U QPM  - FREDDY while NPO  - Hypoglycemia protocol    # GERD  - Continue PTA pantoprazole 40 mg daily     # Macrocytic anemia  Dysplastic cells on prior smear. Workup per hematology, considering bone marrow biopsy. Patient to follow up outpatient.    Prophylaxis  GI: protonix  DVT: DAPT; ambulation    Diet: NPO for cath  Activity: as tolerated  Code Status: Full  Disposition: pending cath    Patient seen discussed w/ Dr. Ott.      Yoko Ovalles APRN, CNP  [facility]  Interventional Cardiology      Most recent vital signs:  /84 (BP Location: Left arm)   Pulse 86   Temp 98.2  F (36.8  C) (Oral)   Resp 15   Ht 1.727  "m (5' 8\")   Wt 73.6 kg (162 lb 4.8 oz)   SpO2 100%   BMI 24.68 kg/m    Temp:  [97.8  F (36.6  C)-98.2  F (36.8  C)] 98.2  F (36.8  C)  Pulse:  [] 86  Resp:  [6-39] 15  BP: (111-143)/(66-84) 137/84  SpO2:  [93 %-100 %] 100 %  Wt Readings from Last 2 Encounters:   01/11/24 73.6 kg (162 lb 4.8 oz)   01/09/24 74.8 kg (165 lb)     Intake/Output Summary (Last 24 hours) at 1/11/2024 0821  Last data filed at 1/11/2024 0657  Gross per 24 hour   Intake --   Output 300 ml   Net -300 ml       Physical exam:  General: In bed, in NAD  HEENT: EOMI, PERRLA, no scleral icterus or injection  CARDIAC: RRR, no m/r/g appreciated  RESP: CTAB, no wheezes, rhonchi or crackles appreciated.  GI: NABS, NT/ND, no guarding or rebound  EXTREMITIES: No LE edema, pulses 1+  SKIN: Scattered ecchymosis at baseline  NEURO: Alert and oriented X3, CN II-XII grossly intact, no focal neurological deficits noted      Labs (Past three days):  CBC  Recent Labs   Lab 01/11/24  0425 01/10/24  1627 01/09/24  1012   WBC 10.7 10.1 10.9   RBC 3.38* 3.43* 3.25*   HGB 11.3* 11.7* 11.1*   HCT 35.5* 35.7* 33.7*   * 104* 104*   MCH 33.4* 34.1* 34.2*   MCHC 31.8 32.8 32.9   RDW 13.6 13.6 13.4    194 194     BMP  Recent Labs   Lab 01/11/24  0425 01/11/24  0326 01/10/24  2151 01/10/24  1627 01/09/24  1336 01/09/24  1012     --   --  139  --  142   POTASSIUM 3.9  --   --  4.5  --  4.0   CHLORIDE 107  --   --  104  --  108*   CO2 23  --   --  25  --  24   ANIONGAP 10  --   --  10  --  10   * 108* 123* 176*   < > 136*   BUN 37.1*  --   --  32.4*  --  29.5*   CR 1.23*  --   --  1.15  --  1.04   GFRESTIMATED 63  --   --  68  --  77   ERIN 8.7*  --   --  9.0  --  9.3    < > = values in this interval not displayed.     Troponins:   Lab Results   Component Value Date    CTROPT 23 (H) 01/11/2024    CTROPT 22 01/10/2024    CTROPT 19 01/09/2024    CTROPT 24 (H) 01/09/2024    CTROPT 29 (H) 01/09/2024        INR  Recent Labs   Lab 01/09/24  1012 "   INR 1.03     Liver panel  Recent Labs   Lab 01/09/24  1012   PROTTOTAL 6.0*   ALBUMIN 3.8   BILITOTAL 0.6   ALKPHOS 46   AST 42   ALT 51       Imaging/procedure results    EKG 12 Lead 1/9/24:       Echocardiogram 1/11/24:  Interpretation Summary  Global and regional left ventricular function is normal with an EF of 55-60%.  The right ventricle is normal size. Global right ventricular function is  normal.  No significant valvular disease.  No pericardial effusion.  Normal IVC.     This study was compared with the study from 7/31/2012. There has been no  change.  ______________________________________________________________________________  Left Ventricle  Left ventricular size is normal. Global and regional left ventricular function  is normal with an EF of 55-60%. Mild concentric wall thickening consistent  with left ventricular hypertrophy is present. Left ventricular diastolic  function is indeterminate. Diastolic Doppler findings (E/E' ratio and/or other  parameters) suggest left ventricular filling pressures are normal. No regional  wall motion abnormalities are seen.     Right Ventricle  Global right ventricular function is normal. The right ventricle is normal  size.     Atria  The right atria appears normal. Moderate left atrial enlargement is present.     Mitral Valve  The mitral valve is normal.     Aortic Valve  Aortic valve is normal in structure and function. The aortic valve is  tricuspid.     Tricuspid Valve  The tricuspid valve is normal. The peak velocity of the tricuspid regurgitant  jet is not obtainable. Pulmonary artery systolic pressure cannot be assessed.     Pulmonic Valve  The pulmonic valve is normal.     Vessels  The inferior vena cava was normal in size with preserved respiratory  variability. The aorta root is normal. The thoracic aorta is normal. Sinuses  of Valsalva 3.5 cm. Ascending aorta 3.5 cm.     Pericardium  Prominent epicardial fat is noted. No pericardial effusion is  present.     Compared to Previous Study  This study was compared with the study from 7/31/2012 . There has been no  change.      Medical Decision Making     75 MINUTES SPENT BY ME on the date of service doing chart review, history, exam, documentation & further activities per the note.

## 2024-01-11 NOTE — Clinical Note
The first balloon was inserted into the left anterior descending and middle left anterior descending.Max pressure = 10 jen. Total duration = 10 seconds.     Max pressure = 10 jen. Total duration = 10 seconds.    Balloon reinflated a second time: Max pressure = 10 jen. Total duration = 10 seconds.

## 2024-01-11 NOTE — PHARMACY-ADMISSION MEDICATION HISTORY
Pharmacist Admission Medication History    The admission medication history was completed by a pharmacist on 1/9/2024 at [facility]. Please see note for details.    Elizabeth Killian RPH

## 2024-01-11 NOTE — Clinical Note
Stent deployed in the middle left anterior descending. Max pressure = 14 jen. Total duration = 10 seconds.

## 2024-01-11 NOTE — CONSULTS
Discharge Pharmacy Test Claim    Patient has pharmacy benefits through [employer] Part D plan with an unmet $545 drug deductible. Initial copay for brilinta is $412.60 due to the unmet drug deductible. Expected copays for brilinta before and after the deductible listed below.    Test Claim Initial Copay Copay after Deductible is met   brilinta 412.60 225.55     Alma Rosado  [facility] Liaison  Ph: [phone]  Fax [phone]  Available on [employer] (learn more here)

## 2024-01-11 NOTE — PROGRESS NOTES
Admission  1/11/2024       2:50 AM  -----------------------------------------------------------  Admitted from: [facility]  For: Heart workup  Report given from: [facility]  Via: Liter  Accompanied by: ED staff  Family Aware of Admission: Yes, Wife  Medications: None  Chart: In cabinet  Belongings: In pt's room. Cell phone, clothing, hearing aid, glasses.  Teaching: Call light, S/S to report, medication, angiogram procedure.  IV Access: R PIV, SL  Telemetry: In place  Ht./Wt.: Done  2 RN Skin Check: Done. Cosigned by Chikis AVALOS Bruising and extensive discoloration noted in bilateral upper extremity. Skin is flaky. Blanchable redness in BLE. Left eye skin laceration from skin tag removal. Less than pea-sized sores on left small toe and right small and big toes.

## 2024-01-11 NOTE — PLAN OF CARE
Goal Outcome Evaluation:    1900 to 0730 hours:     Pt transferred from outside hospital ED. Had presented with CP & pressure on exertion and admitted for ischemic workup. Troponin normal and EKG without ischemic changes. Patient's chest pain eventually subsided without intervention. Recevied ASA and brilinta load. 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 Discussed with cardiology and admitted on DAPT for possible angiogram in AM.      Neuro: A&Ox4. Cooperative, pleasant. Used call light appropriately overnight.  Cardiac: WDL. Denies Chest Pain. SR             Respiratory: Satting well on RA. Lung sounds clear  Diet: NPO in anticipation for angiogram  GI: No BM this shift.   :  Voids in bathroom/urinal.    Activity: Independent  Pain: Denies.   IV Access: R PIV, SL  Skin:  Bruising and extensive discoloration noted in bilateral upper extremity. Skin is flaky. Blanchable redness in BLE. Left eye skin laceration from skin tag removal. Less than pea-sized sores on left small toe and right small and big toes.     Additional:  Belongings in pt's room. Cell phone, clothing, hearing aid, glasses.     Plan: NPO in anticipation for angiogram today.

## 2024-01-11 NOTE — H&P
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    The patient notes that one week ago he started having left sided/substernal chest pain that would be brought on by exertion, last a few minutes, then resolved after a couple of minutes of rest. He had a total of two episodes (one while at cardiac rehab, one while shoveling snow). On the day of presentation he developed a similar episode of chest pain, however, it occurred with rest and lasted a couple hours. He presented to the ED. Troponin normal and EKG without ischemic changes. Patient's chest pain eventually subsided without intervention. Recevied ASA and brilinta load. 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 Discussed with cardiology and admitted on DAPT for possible angiogram in AM.    Review of Systems:    Complete review of systems was performed and negative except per HPI.    PMH:  Past Medical History:   Diagnosis Date    Acute postoperative pain 09/11/2013    Alpha-1-antitrypsin deficiency (H)     Arthritis     Basal cell carcinoma     CMV (cytomegalovirus infection) (H)     Reacttivation Sept 2013 when valcyte held    DVT of upper extremity (deep vein thrombosis) (H) 09/2013    Nonocclusive thrombosis extending from the right subclavian vein to the right axillary vein,  Segmental occlusion of right basilic vein in the upper arm. 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-induced  thrombocytopaenia) 09/2013    With DVT and thrombocytopenia    Hypertension     Lung transplant status, bilateral (H) 09/08/2013    Complicated by HIT and esophageal dysfunction    Pneumonia of right lower lobe due to Pseudomonas species (H) 02/28/2019    Sepsis associated hypotension (H) 02/24/2019    Squamous cell carcinoma     Steroid-induced diabetes mellitus  (H24)     Thrombocytopaenia     due to HIT    Ureteral stone 10/17/2017     Active Problems:  Patient Active Problem List    Diagnosis Date Noted    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) 03/22/2023     Priority: Medium    Tubular adenoma 07/14/2022     Priority: Medium    Acute renal failure superimposed on stage 3 chronic kidney disease, unspecified acute renal failure type, unspecified whether stage 3a or 3b CKD (H) 06/20/2022     Priority: Medium    Chronic kidney disease, stage 3b (H) 11/11/2021     Priority: Medium    Type 2 diabetes mellitus with diabetic polyneuropathy, with long-term current use of insulin (H) 06/21/2021     Priority: Medium    H/O basal cell carcinoma excision 08/04/2020     Priority: Medium    Gastroesophageal reflux disease, esophagitis presence not specified 06/14/2018     Priority: Medium     IMO Regulatory Load OCT 2020      Diverticulosis of large intestine without hemorrhage 06/14/2018     Priority: Medium    Essential hypertension 06/14/2018     Priority: Medium    Chronic obstructive pulmonary disease (H) 01/31/2018     Priority: Medium     Overview:   Severe, 2/2 alpha-1-antitrypsin deficiency; as of 2012 on active list for B lung transplant.      Contact dermatitis and eczema 01/31/2018     Priority: Medium    Gout 01/31/2018     Priority: Medium    Seborrheic keratosis 01/31/2018     Priority: Medium    Osteopenia 05/11/2017     Priority: Medium    Male erectile dysfunction, unspecified 04/26/2016     Priority: Medium    CMV (cytomegalovirus infection) (H) 10/17/2013     Priority: Medium     Overview:    donor-related      Prophylactic antibiotic 10/17/2013     Priority: Medium    Steroid-induced diabetes mellitus  (H24)      Priority: Medium    S/P lung transplant (H) 2013     Priority: Medium     Overview:   [facility]  Transplant coordinator Arcelia Byrne RN; page transplant coordinator after hours  [phone]      Thoracic back pain 2013     Priority: Medium    Thoracic segment dysfunction 2013     Priority: Medium    Alpha-1-antitrypsin deficiency (H)      Priority: Medium    Coronary atherosclerosis 2002     Priority: Medium     Overview:   Focal; no hemodynamically significant lesions       Social History:  Social History     Tobacco Use    Smoking status: Former     Packs/day: 2.00     Years: 15.00     Additional pack years: 0.00     Total pack years: 30.00     Types: Cigarettes     Quit date: 1986     Years since quittin.0     Passive exposure: Past    Smokeless tobacco: Never   Vaping Use    Vaping Use: Never used   Substance Use Topics    Alcohol use: No     Alcohol/week: 0.0 standard drinks of alcohol    Drug use: No     Family History:  Family History   Problem Relation Age of Onset    Heart Failure Mother          with CHF at age 95    Asthma Mother     C.A.D. Mother     Cerebrovascular Disease Father          at age 83 with ministrokes; had arthritis as a farmer    Asthma Sister     Diabetes Sister     Hypertension Sister     Other - See Comments Sister         bleeding disorder    Hypertension Daughter     Other - See Comments Daughter         fibromyalgia    Skin Cancer No family hx of     Melanoma No family hx of     Glaucoma No family hx of     Macular Degeneration No family hx of      Physical Exam:  Temp:  [96.9  F (36.1  C)] 96.9  F (36.1  C)  Pulse:  [] 75  Resp:  [6-39] 6  BP: (111-180)/() 124/75  SpO2:  [92 %-98 %] 96 %  No intake or output data in the 24 hours ending 24 0637  GEN: pleasant, no acute distress  HEENT: no  icterus  CV: RRR, normal s1/s2, no murmurs/rubs/s3/s4  CHEST: clear to ausculation bilaterally, no rales or wheezing  ABD: soft, non-tender, normal active bowel sounds  EXTR: No clubbing, cyanosis or edema.   NEURO: alert oriented, speech fluent/appropriate    Diagnostics:  All labs and imaging were reviewed, of note:    All laboratory data reviewed    Lab Results   Component Value Date    TROPI <0.030 02/24/2019     Transthoracic echocardiogram: 2013  Normal  left ventricular  size, function  and wall motion.   The   visually  estimated ejection  fraction  is between 55-60%.   There is   no  evidence of left  ventricular hypertrophy.   Normal  right ventricular  cavity size  and systolic function.   No  significant valvular  dysfunction.   There  is no evidence  of a pericardial  effusion.     Coronary Angiogram: 2012  Heavily calcified right and left coronary system. LM:distal 30% stenosis. LAD: mild prox disease, D1 (30%), D2 (30%), distal LAD (40%). Lcx: prox (30%), OM1 with mild lesion. RCA dominant: heavily calcified, prox (two 30% stenoses), ostial PDA (60%). 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    #Possible cardiac chest pain  #HLD  Patient presenting with recurrent chest pain c/f stable angina (substernal, triggered by exertion, relieved with rest). On day of presentation, however, had ongoing left-sided chest pain that persisted for a few hours at rest raising concern for ACS. Normal troponin of 22 and EKG without ischemic changes.  Therefore, there was concern for ischemic etiology. Hemodynamically stable. HEART score of 3. Patient admitted with plans to undergo ischemic evaluation prior to discharge. This AM troponin flat at 23 from 22 several hours after initial draw, which is reassuring.  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)  - Cont PTA ASA 81 mg daily  - Cont PTA brilinta 90 mg BID  - Cont PTA coreg 6.25 mg BID  - Lipid panel, A1c    #A1AT deficiency s/p bilateral lung transplants (2013)  #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  #Recurrent CMV viremia  - Transplant Pulmonology consult  - Immunosuppression:  > Cont PTA azathioprine 50 mg daily  > Cont PTA prednisone 7.5 mg daily  > Cont PTA tacrolimus 2 mg QAM & 2.5 mg QPM (goal 8-10)   - Tac trough this AM  - Infection prophylaxis:  > Cont PTA azithromycin 250 mg MWF  > Cont PTA dapsone 50 mg daily  - Cont PTA pulmicort neb   - Cont PTA montelukast  - Cont PTA florinef 0.1 mg daily (indication CNI-related hyperkalemia)  - Cont PTA magnesium oxide    #HTN  - Decrease PTA amlodipine to 5 mg daily given adequate control with additional agents  - Hold PTA lisinopril 40 mg daily pre-procedurally given likelihood for contrast administration  - Cont PTA coreg 6.25 mg BID    #PAD  Following with cardiac rehab  - Cont PTA crestor 20 mg every MWF  - Cont PTA aspirin 81 mg daily    #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 Last visit Aug 2023. Previously on eliquis but stopped due to cost. Not felt to have a strong indication for fondaparinux or warfarin. Was continued on aspirin & plavix. Switched plavix to brilinta in setting of NSTEMI/UA workup.  - Cont PTA aspirin 81 mg daily  - Cont PTA brilinta 90 mg BID    #LE edema  - Hold PTA lasix 20 mg daily while NPO and for possible contrast use with procedure    #Steroid-induced DM  -  Hold PTA insulin glargine 18 U QAM while NPO  - Hold PTA insulin aspart 5 U QAM, 3 U at noon, 5 U QPM  - FREDDY while NPO  - Hypoglycemia protocol    #GERD  - PTA pantoprazole 40 mg daily    #Macrocytic anemia  Dysplastic cells on prior smear. Workup per hematology, considering bone marrow biopsy.    FEN: NPO  PPx: N/A, ambulate  Code: full    To be formally staffed in AM.    Taisha Willett,   Internal Medicine, PGY-3

## 2024-01-12 ENCOUNTER — TELEPHONE (OUTPATIENT)
Dept: OPHTHALMOLOGY | Facility: CLINIC | Age: 69
End: 2024-01-12
Payer: MEDICARE

## 2024-01-12 VITALS
HEART RATE: 93 BPM | OXYGEN SATURATION: 95 % | BODY MASS INDEX: 24.32 KG/M2 | WEIGHT: 160.5 LBS | HEIGHT: 68 IN | RESPIRATION RATE: 16 BRPM | DIASTOLIC BLOOD PRESSURE: 65 MMHG | SYSTOLIC BLOOD PRESSURE: 114 MMHG | TEMPERATURE: 97.1 F

## 2024-01-12 LAB
ALBUMIN SERPL BCG-MCNC: 3.6 G/DL (ref 3.5–5.2)
ALP SERPL-CCNC: 57 U/L (ref 40–150)
ALT SERPL W P-5'-P-CCNC: 46 U/L (ref 0–70)
ANION GAP SERPL CALCULATED.3IONS-SCNC: 10 MMOL/L (ref 7–15)
APTT PPP: 35 SECONDS (ref 22–38)
AST SERPL W P-5'-P-CCNC: 37 U/L (ref 0–45)
ATRIAL RATE - MUSE: 70 BPM
BILIRUB SERPL-MCNC: 0.7 MG/DL
BUN SERPL-MCNC: 35.3 MG/DL (ref 8–23)
CALCIUM SERPL-MCNC: 8.6 MG/DL (ref 8.8–10.2)
CHLORIDE SERPL-SCNC: 105 MMOL/L (ref 98–107)
CREAT SERPL-MCNC: 1.21 MG/DL (ref 0.67–1.17)
DEPRECATED HCO3 PLAS-SCNC: 23 MMOL/L (ref 22–29)
DIASTOLIC BLOOD PRESSURE - MUSE: NORMAL MMHG
EGFRCR SERPLBLD CKD-EPI 2021: 64 ML/MIN/1.73M2
ERYTHROCYTE [DISTWIDTH] IN BLOOD BY AUTOMATED COUNT: 13.9 % (ref 10–15)
GLUCOSE BLDC GLUCOMTR-MCNC: 142 MG/DL (ref 70–99)
GLUCOSE BLDC GLUCOMTR-MCNC: 148 MG/DL (ref 70–99)
GLUCOSE BLDC GLUCOMTR-MCNC: 162 MG/DL (ref 70–99)
GLUCOSE SERPL-MCNC: 111 MG/DL (ref 70–99)
HCT VFR BLD AUTO: 35.9 % (ref 40–53)
HGB BLD-MCNC: 11.3 G/DL (ref 13.3–17.7)
INTERPRETATION ECG - MUSE: NORMAL
MAGNESIUM SERPL-MCNC: 1.9 MG/DL (ref 1.7–2.3)
MCH RBC QN AUTO: 33.3 PG (ref 26.5–33)
MCHC RBC AUTO-ENTMCNC: 31.5 G/DL (ref 31.5–36.5)
MCV RBC AUTO: 106 FL (ref 78–100)
P AXIS - MUSE: 23 DEGREES
PLATELET # BLD AUTO: 178 10E3/UL (ref 150–450)
POTASSIUM SERPL-SCNC: 4.8 MMOL/L (ref 3.4–5.3)
PR INTERVAL - MUSE: 176 MS
PROT SERPL-MCNC: 5.8 G/DL (ref 6.4–8.3)
QRS DURATION - MUSE: 80 MS
QT - MUSE: 432 MS
QTC - MUSE: 466 MS
R AXIS - MUSE: -20 DEGREES
RBC # BLD AUTO: 3.39 10E6/UL (ref 4.4–5.9)
SODIUM SERPL-SCNC: 138 MMOL/L (ref 135–145)
SYSTOLIC BLOOD PRESSURE - MUSE: NORMAL MMHG
T AXIS - MUSE: 127 DEGREES
VENTRICULAR RATE- MUSE: 70 BPM
WBC # BLD AUTO: 11 10E3/UL (ref 4–11)

## 2024-01-12 PROCEDURE — 250N000013 HC RX MED GY IP 250 OP 250 PS 637

## 2024-01-12 PROCEDURE — 85027 COMPLETE CBC AUTOMATED: CPT

## 2024-01-12 PROCEDURE — 250N000012 HC RX MED GY IP 250 OP 636 PS 637: Performed by: PHYSICIAN ASSISTANT

## 2024-01-12 PROCEDURE — 93010 ELECTROCARDIOGRAM REPORT: CPT | Performed by: INTERNAL MEDICINE

## 2024-01-12 PROCEDURE — 36415 COLL VENOUS BLD VENIPUNCTURE: CPT

## 2024-01-12 PROCEDURE — 99239 HOSP IP/OBS DSCHRG MGMT >30: CPT | Mod: 24

## 2024-01-12 PROCEDURE — 93005 ELECTROCARDIOGRAM TRACING: CPT

## 2024-01-12 PROCEDURE — 250N000011 HC RX IP 250 OP 636

## 2024-01-12 PROCEDURE — 250N000013 HC RX MED GY IP 250 OP 250 PS 637: Performed by: INTERNAL MEDICINE

## 2024-01-12 PROCEDURE — 999N000111 HC STATISTIC OT IP EVAL DEFER

## 2024-01-12 PROCEDURE — 250N000012 HC RX MED GY IP 250 OP 636 PS 637

## 2024-01-12 PROCEDURE — 83735 ASSAY OF MAGNESIUM: CPT

## 2024-01-12 PROCEDURE — 85730 THROMBOPLASTIN TIME PARTIAL: CPT | Performed by: INTERNAL MEDICINE

## 2024-01-12 PROCEDURE — 80053 COMPREHEN METABOLIC PANEL: CPT

## 2024-01-12 RX ORDER — NALOXONE HYDROCHLORIDE 0.4 MG/ML
0.4 INJECTION, SOLUTION INTRAMUSCULAR; INTRAVENOUS; SUBCUTANEOUS
Status: DISCONTINUED | OUTPATIENT
Start: 2024-01-12 | End: 2024-01-12 | Stop reason: HOSPADM

## 2024-01-12 RX ORDER — NALOXONE HYDROCHLORIDE 0.4 MG/ML
0.2 INJECTION, SOLUTION INTRAMUSCULAR; INTRAVENOUS; SUBCUTANEOUS
Status: DISCONTINUED | OUTPATIENT
Start: 2024-01-12 | End: 2024-01-12 | Stop reason: HOSPADM

## 2024-01-12 RX ORDER — NITROGLYCERIN 0.4 MG/1
TABLET SUBLINGUAL
Qty: 30 TABLET | Refills: 3 | Status: SHIPPED | OUTPATIENT
Start: 2024-01-12 | End: 2024-01-22

## 2024-01-12 RX ORDER — HYDROMORPHONE HYDROCHLORIDE 1 MG/ML
0.5 INJECTION, SOLUTION INTRAMUSCULAR; INTRAVENOUS; SUBCUTANEOUS
Status: COMPLETED | OUTPATIENT
Start: 2024-01-12 | End: 2024-01-12

## 2024-01-12 RX ORDER — METHOCARBAMOL 750 MG/1
750 TABLET, FILM COATED ORAL ONCE
Status: COMPLETED | OUTPATIENT
Start: 2024-01-12 | End: 2024-01-12

## 2024-01-12 RX ORDER — MAGNESIUM OXIDE 400 MG/1
400 TABLET ORAL EVERY 4 HOURS
Status: COMPLETED | OUTPATIENT
Start: 2024-01-12 | End: 2024-01-12

## 2024-01-12 RX ADMIN — MAGNESIUM OXIDE TAB 400 MG (241.3 MG ELEMENTAL MG) 400 MG: 400 (241.3 MG) TAB at 11:50

## 2024-01-12 RX ADMIN — DAPSONE 50 MG: 25 TABLET ORAL at 07:52

## 2024-01-12 RX ADMIN — ROSUVASTATIN CALCIUM 20 MG: 5 TABLET, FILM COATED ORAL at 07:52

## 2024-01-12 RX ADMIN — MAGNESIUM OXIDE TAB 400 MG (241.3 MG ELEMENTAL MG) 400 MG: 400 (241.3 MG) TAB at 07:52

## 2024-01-12 RX ADMIN — INSULIN ASPART 1 UNITS: 100 INJECTION, SOLUTION INTRAVENOUS; SUBCUTANEOUS at 01:29

## 2024-01-12 RX ADMIN — CARVEDILOL 6.25 MG: 3.12 TABLET, FILM COATED ORAL at 07:51

## 2024-01-12 RX ADMIN — PANTOPRAZOLE SODIUM 40 MG: 40 TABLET, DELAYED RELEASE ORAL at 07:52

## 2024-01-12 RX ADMIN — MAGNESIUM OXIDE TAB 400 MG (241.3 MG ELEMENTAL MG) 400 MG: 400 (241.3 MG) TAB at 08:40

## 2024-01-12 RX ADMIN — CLOPIDOGREL BISULFATE 75 MG: 75 TABLET ORAL at 07:51

## 2024-01-12 RX ADMIN — FLUDROCORTISONE ACETATE 0.1 MG: 0.1 TABLET ORAL at 07:52

## 2024-01-12 RX ADMIN — HYDROMORPHONE HYDROCHLORIDE 0.5 MG: 1 INJECTION, SOLUTION INTRAMUSCULAR; INTRAVENOUS; SUBCUTANEOUS at 03:47

## 2024-01-12 RX ADMIN — AZATHIOPRINE 50 MG: 50 TABLET ORAL at 07:52

## 2024-01-12 RX ADMIN — PREDNISONE 5 MG: 5 TABLET ORAL at 07:51

## 2024-01-12 RX ADMIN — Medication 1 TABLET: at 07:52

## 2024-01-12 RX ADMIN — INSULIN ASPART 1 UNITS: 100 INJECTION, SOLUTION INTRAVENOUS; SUBCUTANEOUS at 07:55

## 2024-01-12 RX ADMIN — FLUTICASONE FUROATE AND VILANTEROL TRIFENATATE 1 PUFF: 100; 25 POWDER RESPIRATORY (INHALATION) at 07:53

## 2024-01-12 RX ADMIN — THERA TABS 1 TABLET: TAB at 07:52

## 2024-01-12 RX ADMIN — INSULIN ASPART 1 UNITS: 100 INJECTION, SOLUTION INTRAVENOUS; SUBCUTANEOUS at 11:09

## 2024-01-12 RX ADMIN — METHOCARBAMOL 750 MG: 750 TABLET ORAL at 01:25

## 2024-01-12 RX ADMIN — AZITHROMYCIN DIHYDRATE 250 MG: 250 TABLET ORAL at 07:52

## 2024-01-12 RX ADMIN — AMLODIPINE BESYLATE 10 MG: 10 TABLET ORAL at 07:51

## 2024-01-12 RX ADMIN — TACROLIMUS 2 MG: 1 CAPSULE ORAL at 07:52

## 2024-01-12 RX ADMIN — ACETAMINOPHEN 650 MG: 325 TABLET, FILM COATED ORAL at 01:25

## 2024-01-12 RX ADMIN — ASPIRIN 81 MG: 81 TABLET ORAL at 07:52

## 2024-01-12 RX ADMIN — LISINOPRIL 40 MG: 40 TABLET ORAL at 09:19

## 2024-01-12 RX ADMIN — FUROSEMIDE 20 MG: 20 TABLET ORAL at 08:40

## 2024-01-12 ASSESSMENT — ACTIVITIES OF DAILY LIVING (ADL)
ADLS_ACUITY_SCORE: 22

## 2024-01-12 NOTE — CONFIDENTIAL NOTE
Called patient to discuss the results of his recent eyelid lesion biopsy. Recommend follow up with local eye provider to discuss demodex and follow up with dermatologist for skin checks and demodex. Patient was appreciative for the call and agreeable with the plan of care.    Anusha Ramos MD  Oculoplastic Surgery Fellow, [facility]

## 2024-01-12 NOTE — DISCHARGE SUMMARY
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     Hospital Course by Diagnosis:  # CAD s/p MEGHNA x1 to LAD  # HLD  # HTN  # PAD  # LE Edema  Pt reported left sided/substernal chest pain that started last Thursday 1/4 at PT, brought on by exertion, then resolving after a 1-2 min of rest. Pt had second episode on Saturday while shoveling snow that he states lasted longer and felt stronger. On the day of presentation he developed a similar episode of chest pain, however, it occurred with rest and lasted a couple hours. He presented to the ED. Troponin 22->23, EKG without ischemic changes. Patient's CP eventually subsided without intervention. Received ASA and Brilinta load.   - Angiogram 1/11/24: MEGHNA x1 to LAD with plan to return for staged PCI of RCA in 3-4 weeks to allow for renal function to recover  - Echo 1/11: EF 55-60%, LV & RV function normal without valvular disease  - Continue PTA  ASA 81 mg daily  - Continue PTA Plavix 75mg daily (Brilinta cost-prohibitive)  - Continue PTA Coreg 6.25 mg BID  - Continue PTA amlodipine 10 mg daily  - Continue PTA lisinopril 40 mg daily  - Continue PTA Crestor 20 mg every MWF  - Resume PTA Lasix 20mg daily starting tomorrow  - SL nitroglycerin ordered at discharge as precaution  - Continue Supervised Exercise Therapy for PAD  - Start Cardiac Rehab once 2nd PCI completed  - Lipid panel 1/11/24: , LDL 60, HDL 38,     # A1AT deficiency s/p bilateral lung transplants (2013)  # 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  # Recurrent CMV viremia  - Transplant Pulmonology consulted and recommend IS; continue PTA tacro, azathrioprine, and prednisone; continue Azithromycin 250mg MWF, Singular, and Advair  - Immunosuppression:  - Continue PTA azathioprine 50 mg daily  - Continue PTA prednisone 7.5 mg daily  - Continue PTA tacrolimus 2 mg QAM & 2.5 mg QPM (goal 8-10)  - Infection prophylaxis:  - Continue PTA azithromycin 250 mg MWF  - Continue PTA dapsone 50 mg daily  - Continue PTA pulmicort neb   - Continue PTA montelukast QHS  - Continue PTA florinef 0.1 mg daily (indication CNI-related hyperkalemia)  - Continue PTA magnesium oxide 400 mg BID     # 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 Last visit Aug 2023. Previously on eliquis but stopped due to cost. Not felt to have a strong indication for fondaparinux or warfarin. Per discussion with Dr. Alma Murphy in lung transplant, patient to continue with DAPT only with Plavix and ASA. Note from Dr. Cogan on 8/22/23, patient does not need to be on DOAC as DVT was provoked.   - Continue PTA aspirin 81 mg daily  - Continue PTA Plavix 75 mg daily  - Brilinta is cost-prohibitive to patient  - Follow up with Dr. Cogan 1/30/24     # Steroid-induced DM  A1c this admission 4.3 (1/11/24)  - Resume PTA insulin regimen  - Follow up  with PCP as OP     # GERD  - Continue PTA pantoprazole 40 mg daily     # Macrocytic anemia  Dysplastic cells on prior smear. Workup per hematology, considering bone marrow biopsy.   - Sees Dr. Cogan 1/30/24     Pertinent Procedures:  1. Angiogram with PCI    Consults:  Pharmacy    Medication Changes:  See Medication List Below    Discharge medications:   Current Discharge Medication List        START taking these medications    Details   nitroGLYcerin (NITROSTAT) 0.4 MG sublingual tablet For chest pain place 1 tablet under the tongue every 5 minutes for 3 doses. If symptoms persist 5 minutes after 1st dose call 911.  Qty: 30 tablet, Refills: 3    Associated Diagnoses: Unstable angina (H); Coronary artery disease involving native coronary artery of native heart with unstable angina pectoris (H)           CONTINUE these medications which have NOT CHANGED    Details   acetaminophen (TYLENOL) 325 MG tablet Take 2 tablets (650 mg) by mouth every 6 hours as needed for mild pain  Qty: 60 tablet, Refills: 0    Associated Diagnoses: Intermittent claudication due to atherosclerosis of artery of extremity (H24)      albuterol (PROAIR HFA/PROVENTIL HFA/VENTOLIN HFA) 108 (90 Base) MCG/ACT inhaler Inhale 1-2 puffs into the lungs every 6 hours as needed for shortness of breath or wheezing  Qty: 8.5 g, Refills: 3    Comments: Pharmacy may dispense brand covered by insurance (Proair, or proventil or ventolin or generic albuterol inhaler)  Associated Diagnoses: Wheezing      amLODIPine (NORVASC) 10 MG tablet Take 1 tablet (10 mg) by mouth daily  Qty: 90 tablet, Refills: 3    Associated Diagnoses: Essential hypertension      aspirin (ASA) 81 MG EC tablet Take 1 tablet (81 mg) by mouth daily  Qty: 90 tablet, Refills: 3    Associated Diagnoses: Claudication (H24)      azaTHIOprine (IMURAN) 50 MG tablet Take 1 tablet (50 mg) by mouth daily  Qty: 30 tablet, Refills: 11    Comments: TXP DT 9/8/2013 (Lung) TXP Dischg DT 10/4/2013 DX Lung  replaced by transplant Z94.2 [facility] [place] ([place], [place])  Associated Diagnoses: Lung replaced by transplant (H)      azithromycin (ZITHROMAX) 250 MG tablet Take 250 mg by mouth Every Mon, Wed, Fri Morning      Calcium Carbonate-Vitamin D 600-10 MG-MCG TABS Take 1 tablet by mouth 2 times daily (with meals)  Qty: 60 tablet, Refills: 11    Associated Diagnoses: Lung transplant status, bilateral (H)      carvedilol (COREG) 6.25 MG tablet TAKE 1 TABLET (6.25 MG) BY MOUTH 2 TIMES DAILY (WITH MEALS)  Qty: 120 tablet, Refills: 0    Associated Diagnoses: Essential hypertension      clopidogrel (PLAVIX) 75 MG tablet Take 1 tablet (75 mg) by mouth daily  Qty: 90 tablet, Refills: 3    Associated Diagnoses: PAD (peripheral artery disease) (H24); Intermittent claudication due to atherosclerosis of artery of extremity (H24)      dapsone (ACZONE) 25 MG tablet Take 2 tablets (50 mg) by mouth daily  Qty: 180 tablet, Refills: 3    Associated Diagnoses: Lung replaced by transplant (H)      econazole nitrate 1 % external cream APPLY TOPICALLY DAILY TO FEET AND HEELS.  Qty: 85 g, Refills: 3    Associated Diagnoses: Tinea pedis of both feet; Diabetes mellitus with peripheral vascular disease (H); Type II or unspecified type diabetes mellitus with neurological manifestations, not stated as uncontrolled(250.60) (H); Skin fissure      fludrocortisone (FLORINEF) 0.1 MG tablet Take 1 tablet (0.1 mg) by mouth daily  Qty: 90 tablet, Refills: 3    Associated Diagnoses: Lung replaced by transplant (H); Hyperkalemia      fluticasone-salmeterol (ADVAIR-HFA) 230-21 MCG/ACT inhaler Inhale 2 puffs into the lungs 2 times daily  Qty: 8 g, Refills: 11    Comments: Rinse and spit thoroughly after each use  Associated Diagnoses: S/P lung transplant (H); Chronic rejection of allograft lung (H)      furosemide (LASIX) 20 MG tablet Take 1 tablet (20 mg) by mouth daily  Qty: 90 tablet, Refills: 4    Associated  Diagnoses: Essential hypertension      insulin aspart (NOVOLOG PEN) 100 UNIT/ML pen Take 5 U am, 3 unit(s) non, 5 unit(s) pm of insulin within 30 minutes of start of breakfast, lunch, and dinner. Do not give if blood sugar is less than 70 mg/dl.      insulin glargine (LANTUS PEN) 100 UNIT/ML pen Inject 18 Units Subcutaneous every morning (before breakfast)    Comments: If Lantus is not covered by insurance, may substitute Basaglar or Semglee or other insulin glargine product per insurance preference at same dose and frequency.    Associated Diagnoses: Diabetes mellitus type 2, diet-controlled (H)      lisinopril (ZESTRIL) 40 MG tablet Take 40 mg by mouth daily      loperamide (IMODIUM) 2 MG capsule Take 1 capsule (2 mg) by mouth 4 times daily as needed for diarrhea  Qty: 120 capsule, Refills: 12    Associated Diagnoses: Lung transplant status, bilateral (H)      magnesium oxide (MAG-OX) 400 MG tablet Take 1 tablet (400 mg) by mouth 2 times daily  Qty: 180 tablet, Refills: 3    Associated Diagnoses: Lung replaced by transplant (H)      montelukast (SINGULAIR) 10 MG tablet Take 1 tablet (10 mg) by mouth every evening  Qty: 90 tablet, Refills: 3    Associated Diagnoses: Lung replaced by transplant (H)      multivitamin, therapeutic (THERA-VIT) TABS Take 1 tablet by mouth daily  Qty: 30 tablet, Refills: 12    Associated Diagnoses: Lung transplant status, bilateral (H)      omeprazole (PRILOSEC) 20 MG DR capsule Take 1 capsule (20 mg) by mouth 2 times daily (before meals)  Qty: 180 capsule, Refills: 3    Associated Diagnoses: Lung transplant status, bilateral (H)      predniSONE (DELTASONE) 5 MG tablet TAKE ONE TABLET BY MOUTH ONCE DAILY IN THE MORNING AND ONE-HALF TABLET IN THE EVENING  Qty: 45 tablet, Refills: 12    Associated Diagnoses: Lung transplant status, bilateral (H)      rosuvastatin (CRESTOR) 40 MG tablet Take 20 mg by mouth Every Mon, Wed, Fri Morning      sildenafil (VIAGRA) 25 MG tablet Take 1 tablet (25  mg) by mouth as needed (as needed)  Qty: 32 tablet, Refills: 11    Associated Diagnoses: Erectile dysfunction, unspecified erectile dysfunction type      !! tacrolimus (GENERIC) 0.5 MG capsule Take 1 capsule (0.5 mg) by mouth every evening Total dose: 2 mg in the AM and 2.5 mg in the PM  Qty: 90 capsule, Refills: 3    Comments: TXP DT 9/8/2013 (Lung) TXP Dischg DT 10/4/2013 DX Lung replaced by transplant Z94.2 [facility] ([place], [place])  Associated Diagnoses: S/P lung transplant (H)      !! tacrolimus (GENERIC) 1 MG capsule Take 2 capsules (2 mg) by mouth 2 times daily Total dose: 2 mg in AM and 2.5 mg in PM  Qty: 120 capsule, Refills: 11    Comments: TXP DT 9/8/2013 (Lung) TXP Dischg DT 10/4/2013 DX Lung replaced by transplant Z94.2 [facility] ([place], [place])  Associated Diagnoses: S/P lung transplant (H)      blood glucose (NO BRAND SPECIFIED) test strip Use to test blood sugar 3 times daily. Dispense as covered by insurance. Dx. Code: E11.9. Preferred brand: Contour Next.  Qty: 300 strip, Refills: 11    Associated Diagnoses: Type 2 diabetes mellitus with diabetic polyneuropathy, with long-term current use of insulin (H)      insulin pen needle (32G X 4 MM) 32G X 4 MM miscellaneous Use 4 pen needles daily or as directed. Dispense as insurance allows. Dx. Code: E09.9  Qty: 400 each, Refills: 11    Associated Diagnoses: Diabetes mellitus type 2, diet-controlled (H)      Lancet Devices (MICROLET NEXT LANCING DEVICE) MISC USE AS DIRECTED  Qty: 1 each, Refills: 3    Associated Diagnoses: Steroid-induced diabetes mellitus  (H24)      Microlet Lancets MISC CHECK BLOOD SUGAR FOUR TIMES DAILY E11.9  Qty: 400 each, Refills: 1    Comments: REFILLS PLEASE  Associated Diagnoses: Diabetes mellitus type 2, diet-controlled (H)      order for DME Equipment being ordered: diabetic shoes  Qty: 1 each, Refills: 0    Associated  Diagnoses: Diabetes mellitus type 2, diet-controlled (H)       !! - Potential duplicate medications found. Please discuss with provider.          Follow-up:  Resume Supervised Exercise Therapy for PAD  Cardiology MEETA in 1-2 weeks   PCP in 1 week   Interventional cardiology in 3-4 weeks for staged PCI    Labs or imaging requiring follow-up after discharge:  [facility]     Code status:  Full Code    Condition on discharge  Temp:  [97.4  F (36.3  C)-97.8  F (36.6  C)] 97.4  F (36.3  C)  Pulse:  [64-96] 96  Resp:  [10-26] 16  BP: ()/(64-88) 118/80  SpO2:  [92 %-98 %] 95 %    General: Alert, interactive, NAD  Eyes: sclera anicteric, EOMI  Cardiovascular: regular rate and rhythm, normal S1 and S2, no murmurs, gallops, or rubs  Resp: lungs slightly diminished bilaterally, no rales, wheezes, or rhonchi  GI: Soft, nontender, nondistended.  Extremities: no edema, no cyanosis or clubbing, femoral access site c/d/I and without signs of hematoma  Skin: Warm and dry, no jaundice or rash  Neuro: moves all extremities equally and responds to questions appropriately  Psych: Alert & oriented x 3    Imaging with results:  Echocardiogram 1/11/24:  Interpretation Summary  Global and regional left ventricular function is normal with an EF of 55-60%.  The right ventricle is normal size. Global right ventricular function is  normal.  No significant valvular disease.  No pericardial effusion.  Normal IVC.     This study was compared with the study from 7/31/2012. There has been no  change.  ______________________________________________________________________________  Left Ventricle  Left ventricular size is normal. Global and regional left ventricular function  is normal with an EF of 55-60%. Mild concentric wall thickening consistent  with left ventricular hypertrophy is present. Left ventricular diastolic  function is indeterminate. Diastolic Doppler findings (E/E' ratio and/or other  parameters) suggest left ventricular filling  pressures are normal. No regional  wall motion abnormalities are seen.     Right Ventricle  Global right ventricular function is normal. The right ventricle is normal  size.     Atria  The right atria appears normal. Moderate left atrial enlargement is present.     Mitral Valve  The mitral valve is normal.     Aortic Valve  Aortic valve is normal in structure and function. The aortic valve is  tricuspid.     Tricuspid Valve  The tricuspid valve is normal. The peak velocity of the tricuspid regurgitant  jet is not obtainable. Pulmonary artery systolic pressure cannot be assessed.     Pulmonic Valve  The pulmonic valve is normal.     Vessels  The inferior vena cava was normal in size with preserved respiratory  variability. The aorta root is normal. The thoracic aorta is normal. Sinuses  of Valsalva 3.5 cm. Ascending aorta 3.5 cm.     Pericardium  Prominent epicardial fat is noted. No pericardial effusion is present.     Compared to Previous Study  This study was compared with the study from 7/31/2012 . There has been no  change.       Coronary Angiogram 1/11/24:    Prox LAD lesion is 80% stenosed.    RPDA lesion is 40% stenosed.    Mid RCA lesion is 60% stenosed.     Severe multi-vessel disease with proximal to mid 80% stenosis in the LAD, and a hemodynamically significant (iFR = 0.86) heavily calcified stenosis in the mid-RCA.   Successful IVUS guided PCI of the proximal to mid-LAD with placement of a Synergy XD 3.5 x 24 mm MEGHNA, with excellent angiographic result and JOSE GUADALUPE 3 flow.   Heavily calcified left common femoral artery which is not suitable for closure with device.          Plan     Follow bedrest per protocol   Continued medical management and lifestyle modifications for cardiovascular risk factor optimizations.   Follow up visit with Nurse Practitioner in 1-2 weeks.   Discontinue tobacco smokingDiscontinue smoking   Return to the primary inpatient team for further evaluation and managmenet    1. Continue  bivalirudin gtt at fixed rate of 1.75 mg/kg/hr for 4 hours. Stop and check a PTT after 2 hours. If <180, remove sheath, followed by 6 hours of bedrest.   2. Continue ticagrelor 90 mg twice daily for at least 12 months  3. Will plan for staged PCI of the mid-RCA in 3-4 weeks once his renal function has stabilized.     Recommendations    General Recommendations:  - Patient given specific instructions regarding care of arteriotomy site, activity restrictions, signs and symptoms of cardiac or vascular complications and to seek immediate medical evaluation should they occur.   - Follow up visit with Nurse Practitioner in 1-2 weeks.   - Discontinue tobacco smoking.       Medications:  - Continue dual antiplatelet therapy for 12 month(s).   - Continue high dose statin therapy indefinitely.   - Risk factor management for atherosclerosis.     Coronary Findings    Diagnostic  Dominance: Right  Left Main   The vessel was visualized by selective angiography and is moderate in size. There was 0% vessel disease.      Left Anterior Descending   The vessel was visualized by selective angiography and is moderate in size. There was moderately calcified diffuse vessel disease.   Prox LAD lesion is 80% stenosed. The lesion is severely calcified. The lesion was not previously treated. The stenosis was measured by a visual reading.      First Diagonal Branch   The vessel is small.      First Septal Branch   The vessel is small.      Second Diagonal Branch   The vessel is moderate in size.      Second Septal Branch   The vessel is small.      Left Circumflex   The vessel was visualized by selective angiography and is moderate in size. There was diffuse vessel disease.      First Obtuse Marginal Branch   The vessel is small.      Second Obtuse Marginal Branch   The vessel is small. The vessel exhibits minimal luminal irregularities.      Third Obtuse Marginal Branch   The vessel is small.      Right Coronary Artery   The vessel was  visualized by selective angiography and is moderate in size. There was severely calcified diffuse vessel disease.   Mid RCA lesion is 60% stenosed. The lesion is severely calcified. Pressure wire/iFR used. Pre adenosine administration IFR: 0.86. The RCA was engaged with a 6 Fr JR 4 GC. We attempted to initially cross the mid-RCA calcified lesion with a Opsense wire however this was unable to cross. We subsequently used a Runthrough wire as a florencia wire which allowed for enough guide stablitiy to faciliate delivery of the Opsense wire distal to the mid-vessel lesion. The lesion was found to be hemodynamically significant with an iFR of 0.86, however, as we had reached the contrast limit for the procedure, we opted to stage such an intervention for the future once his renal function has recovered.      Acute Marginal Branch   The vessel is small.      Right Ventricular Branch   The vessel is small.      Right Posterior Descending Artery   The vessel is moderate in size.   RPDA lesion is 40% stenosed.      Right Posterior Atrioventricular Artery   The vessel is small.      First Right Posterolateral Branch   The vessel is small.      Second Right Posterolateral Branch   The vessel is small.      Third Right Posterolateral Branch   The vessel is small.         Intervention     Prox LAD lesion   Stent   Lesion length: 18 mm. The pre-interventional distal flow is normal (JOSE GUADALUPE 3). A stent was successfully placed. The post-interventional distal flow is normal (JOSE GUADALUPE 3). The LMCA was engaged with a 6 Fr XB 3.5 GC. The lesion in the proximal to mid-LAD was crossed with a Runthrough wire. Pre-dilation was performed with a 2.5 mm and 3.0 mm semi-compliant balloon. A Synergy XD 3.5 x 24 mm MEGHNA was then deployed in the proximal to mid-LAD. IVUS was used to assess the stent which required additional post-dilation with a 3.5 mm NC balloon. There was an excellent angiographic result and JOSE GUADALUPE 3 flow.   There is a 0% residual stenosis  post intervention.             Other imaging studies:      Patient Care Team:  Hoa Olivarez APRN CNP as PCP - General (Nurse Practitioner - Family)  Kirk Broussard MD as MD (Internal Medicine)  Valentino Cristina MD as Referring Physician (Pulmonary Disease)  Ely Aldrich, RN as Diabetes Educator (Diabetes Education)  Hoa Olivarez APRN CNP as Assigned PCP  SAMMI Luz MD as MD (Dermatology)  Alma Murphy MD as Assigned Pulmonology Provider  Robbin Rosales DPM as Assigned Musculoskeletal Provider  Cogan, Jacob, MD as Assigned Cancer Care Provider  Jonny Rizvi MD as MD (Otolaryngology)  Jonny Rizvi MD as Assigned Surgical Provider  Janice Sullivan MD as MD (Ophthalmology)  Derick Carballo MD as Assigned Heart and Vascular Provider  Yessenia Pryor MS, MELLY  [facility] Cardiology  Patient discussed with staff cardiologist, Dr. Shaw, who agrees with the above documentation and plan. Documentation represents joint decision making.     Time Spent on this Encounter   I, Yessenia Pryor PA-C, personally saw the patient today and spent greater than 30 minutes discharging this patient.

## 2024-01-12 NOTE — PROGRESS NOTES
BRIEF PROGRESS NOTE     Left femoral sheath pulled at 2210. Pressure held for 30 minutes. No bleeding noted after pressure relieved. Dressed appropriately.

## 2024-01-12 NOTE — PLAN OF CARE
Occupational Therapy: Orders received. Chart reviewed and discussed with care team.? Occupational Therapy not indicated due to pt mobilizing and completing ADLs independently, plan to discharge today, no acute OT or cardiac rehab needs. Recommend continued cardiac rehab outpatient.? Defer discharge recommendations to medical team.? Will complete orders.

## 2024-01-12 NOTE — TELEPHONE ENCOUNTER
S-(situation): patient discharged today post MEGHNA to LAD.    Prox LAD lesion is 80% stenosed.     RPDA lesion is 40% stenosed.     Mid RCA lesion is 60% stenosed.     1. Severe multi-vessel disease with proximal to mid 80% stenosis in the LAD, and a hemodynamically significant (iFR = 0.86) heavily calcified stenosis in the mid-RCA.   2. Successful IVUS guided PCI of the proximal to mid-LAD with placement of a Synergy XD 3.5 x 24 mm MEGHNA, with excellent angiographic result and JOSE GUADALUPE 3 flow.   3. Heavily calcified left common femoral artery which is not suitable for closure with device.       His right femoral artery access site is tender to touch. He has not yet looked at the site but I have instructed him that it should be flat and dry. He should call us if it is not.   He is to return for a second PCI of his RCA in a few weeks once his renal function is recovered. BMP ordered, will obtain in [facility] clinic.  Reviewed meds at discharge. Crestor dose increased to 40 mg every M,W,F. He was already taking Plavix for his PAD. Nitroglycerin is his only new medication.  He plans on doing his cardiac rehab in [facility] where he already goes for PAD rehab.    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  He presents to the cath lab today for a coronary angiogram +/- PCI in the setting of possible NSTEMI    A-(assessment): coronary artery disease of native vessel    R-(recommendations): follow up with cardiology NP, schedule RCA intervention

## 2024-01-12 NOTE — PLAN OF CARE
D: Presented 1/11 for recurrent, worsening left-sided chest pain & pressure on exertion and is admitted for ischemic workup s/p PCI w/MEGHNA x1. Plan for staged PCI. 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 VSSA on RA. SR 60-70s. C/o back aching while on bedrest partially relieved by tylenol, robaxin, and dilaudid, feeling much improved once off bedrest. BG stable. Adequate uop. SBA. Appeared to rest comfortably overnight.   P: Continue to monitor and notify [facility] with questions/concerns.

## 2024-01-12 NOTE — PLAN OF CARE
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     Pt transferred from outside hospital ED. Had presented with CP & pressure on exertion and admitted for ischemic workup. Troponin normal and EKG without ischemic changes. Patient's chest pain eventually subsided without intervention. Recevied ASA and brilinta load. 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 Discussed with cardiology and admitted on DAPT for possible angiogram in AM.     Angiogram this afternoon, stent placed, came back to unit with L sheath in place, small hematoma formation (softened with pressure), CMS checks WDL, sheath pulled 2215, on bedrest till 0215.      Neuro: A&Ox4. Cooperative, pleasant.   Cardiac: WDL. Denies Chest Pain. SR             Respiratory: Satting well on RA. Lung sounds clear  Diet: NPO prior to angiogram, low sat fat low sodium following   GI: No BM this shift.   :  Voids in bathroom/urinal.    Activity: Independent  Pain: Denies. Pain r/t bedrest  IV Access: R PIV, SL  Skin:  Bruising and extensive discoloration noted in bilateral upper extremity. Skin is flaky. Blanchable redness in BLE. Left eye skin laceration from skin tag removal. Less than pea-sized sores on left small toe and right small and big toes.     Yuli Stafford, RN

## 2024-01-12 NOTE — PROGRESS NOTES
NUTRITION EDUCATION    REASON FOR ASSESSMENT:  Nutrition education on American Heart Association (AHA) Heart Healthy Diet.    NUTRITION HISTORY:  Information obtained from patient.    Pt reports having previous education with an RD after his lung transplant, otherwise has not had other nutrition education before. Pt reports consuming grass-fed beef, having sausage/posey with breakfast 3-4 times/week, and having ice cream in the evenings occasionally. Pt also reports making his own soups, and buys vegetables frozen mostly. Pt did have some knowledge regarding heart healthy diet guidelines, but hadn't put them into practice yet. Encouraged making small changes at a time to foster sustainable lifestyle changes (I.e. reducing sausage/posey to 2 times/week at first, switching to frozen yogurt vs ice cream or decreasing frequency slightly). Encouraged pt to ask any questions prior to discharge, and utilize the dietitian available at cardiac rehab.    CURRENT DIET ORDER:  Low Saturated Fat Na <2400 mg    NUTRITION DIAGNOSIS:  Food- and nutrition-related knowledge deficit R/t  no previous heart healthy nutrition education as evidenced by pt report of no previous encounters with RD to discuss a heart healthy diet.    INTERVENTIONS:  Nutrition Prescription:  Recommended AHA Heart Healthy Diet    Implementation:   Nutrition Education (Content):  reviewed Heart Healthy Diet guidelines  provided heart healthy diet handout  Nutrition Education (Application):  Discussed current eating habits and recommended alternative food choices  Anticipate good compliance  Diet Education - refer to Education flowsheet    Goals:  Patient verbalizes understanding of diet by voicing 3 foods high in saturated fat and 3 foods high in sodium.  All of the above goals met during education session    Follow Up/Monitoring:  Pt may ask RD any questions r/t nutrition education prior to discharge  Encouraged pt to utilize dietitian at cardiac  rehab    Farideh Zuleta, MPS, RD, LD  6C (beds 4347-3104) + 7C (beds 3787-4260) + ED   RD pager: [phone]  Weekend/Holiday RD pager: [phone]

## 2024-01-12 NOTE — PLAN OF CARE
DISCHARGE   Discharged to: Home  Via: Automobile  Accompanied by: Wife and daughter.   Discharge Instructions: diet, activity, medications, follow up appointments, when to call the MD, and what to watchout for (i.e. s/s of infection, increasing SOB, palpitations, chest pain,)  Prescriptions: To be filled by discharge pharmacy per pt's request; medication list reviewed & sent with pt  Follow Up Appointments: arranged; information given  Belongings: All sent with pt  IV: out  Telemetry: off  Pt exhibits understanding of above discharge instructions; all questions answered.  Discharge Paperwork: printed copy reviewed and sent home with pt.

## 2024-01-13 LAB
ATRIAL RATE - MUSE: 69 BPM
DIASTOLIC BLOOD PRESSURE - MUSE: NORMAL MMHG
INTERPRETATION ECG - MUSE: NORMAL
P AXIS - MUSE: 26 DEGREES
PR INTERVAL - MUSE: 170 MS
QRS DURATION - MUSE: 78 MS
QT - MUSE: 402 MS
QTC - MUSE: 430 MS
R AXIS - MUSE: -20 DEGREES
SYSTOLIC BLOOD PRESSURE - MUSE: NORMAL MMHG
T AXIS - MUSE: 93 DEGREES
VENTRICULAR RATE- MUSE: 69 BPM

## 2024-01-16 ENCOUNTER — HOSPITAL ENCOUNTER (OUTPATIENT)
Dept: CARDIAC REHAB | Facility: HOSPITAL | Age: 69
Discharge: HOME | End: 2024-01-16
Attending: INTERNAL MEDICINE
Payer: MEDICARE

## 2024-01-16 PROCEDURE — 93668 PERIPHERAL VASCULAR REHAB: CPT

## 2024-01-16 PROCEDURE — 999N000124 HC STATISTIC PAD/SET VISIT

## 2024-01-16 NOTE — UTILIZATION REVIEW
Admission Status; Secondary Review Determination    No action to be taken. Please contact me on my Email : [email or web address] if you have any questions.    As part of the [place] Utilization review plan, a self-audit is done on Medicare inpatient admission with less than 2 midnights stay. The 2014 IPPS Final Rule allows outpatient billing in the event that a hospital determines that an inpatient admission was not medically necessary under utilization review process.     (x) Outpatient status would be Appropriate- Short Stay- Post discharge review.    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     He presented to the ED. Troponin 22->23, EKG without ischemic changes. Patient's CP eventually subsided without intervention. Received ASA and Brilinta load.   - Angiogram 1/11/24: MEGHNA x1 to LAD with plan to return for staged PCI of RCA in 3-4 weeks to allow for renal function to recover    Patient was admitted and discharge after one night stay. Record was sent by  for a PA review. Based on the  severity of illness, intensity of service provided, expected LOS and risk for adverse outcome make the care appropriate for further outpatient/observation; however, doesn't meet criteria for hospital inpatient admission.       The information on this document is developed by the utilization review team in order for the business office to ensure compliance.  This only denotes the appropriateness of proper admission status and does not reflect the quality of care rendered.       The definitions of Inpatient Status and  Observation Status used in making the determination above are those provided in the CMS Coverage Manual, Chapter 1 and Chapter 6, section 70.4.     Please cont me if you want to discuss further about this admission episode.      Shailesh Mendez MD, FACP, DEBORAH  Medical Director - Utilization management  Staff Hospitalist  [facility]    Pager: [phone]

## 2024-01-17 ENCOUNTER — LAB (OUTPATIENT)
Dept: LAB | Facility: OTHER | Age: 69
End: 2024-01-17
Payer: MEDICARE

## 2024-01-17 ENCOUNTER — TELEPHONE (OUTPATIENT)
Dept: CARDIAC REHAB | Facility: OTHER | Age: 69
End: 2024-01-17

## 2024-01-17 DIAGNOSIS — I25.10 CORONARY ATHEROSCLEROSIS: ICD-10-CM

## 2024-01-17 LAB
ANION GAP SERPL CALCULATED.3IONS-SCNC: 11 MMOL/L (ref 7–15)
BUN SERPL-MCNC: 42.2 MG/DL (ref 8–23)
CALCIUM SERPL-MCNC: 9.3 MG/DL (ref 8.8–10.2)
CHLORIDE SERPL-SCNC: 106 MMOL/L (ref 98–107)
CREAT SERPL-MCNC: 1.28 MG/DL (ref 0.67–1.17)
DEPRECATED HCO3 PLAS-SCNC: 25 MMOL/L (ref 22–29)
EGFRCR SERPLBLD CKD-EPI 2021: 60 ML/MIN/1.73M2
GLUCOSE SERPL-MCNC: 68 MG/DL (ref 70–99)
HOLD SPECIMEN: NORMAL
POTASSIUM SERPL-SCNC: 4.5 MMOL/L (ref 3.4–5.3)
SODIUM SERPL-SCNC: 142 MMOL/L (ref 135–145)

## 2024-01-17 PROCEDURE — 80048 BASIC METABOLIC PNL TOTAL CA: CPT | Mod: ZL

## 2024-01-17 PROCEDURE — 36415 COLL VENOUS BLD VENIPUNCTURE: CPT | Mod: ZL

## 2024-01-17 NOTE — TELEPHONE ENCOUNTER
Patient verified their .  Patient Called  cardiac rehab as he stated he had stent placed at [facility].  States it is a staged procedure.  We did not receive a referral yet for this patient.  Appointment set for 24 for cardiac rehab intake.  After reading discharge summary of 24 at [facility] notes stated not to start cardiac rehab until after staged procedure is completed.  Patient has follow up with cardiology on 24 will wait to hear what the plan is from that appointment and when his staged procedure will be scheduled.  We will keep appointment scheduled for 24 until after his follow up on 24 and if needed we can change or cancel our cardiac rehab appointment for 24 after the cardiology follow up.  Patient verbalized understanding.

## 2024-01-17 NOTE — PROGRESS NOTES
Virtual Visit Details    Type of service:  Video Visit   Video Start Time: 2:46  Video End Time:2:57 PM    Originating Location (pt. Location): Home    Distant Location (provider location):  On-site  Platform used for Video Visit: Damion        [facility] - [facility] [place]   Cardiology Clinic Note      HPI:   Mr. 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 He is seen today via video visit for follow up.    He presented to [facility] on 1/11, reported with left-sided/substernal chest pain with exertion that had been occurring for 1 week, decreased at rest. Troponin 22->23, EKG without ischemic changes. He underwent coronary angiogram 1/11/23 showed severe multivessel disease with 80% stenosis in prox-mid LAD and heavily calcified stenosis in mid-RCA. He underwent PCI x1 to LAD, with plan to return for staged PCI.     Today he denies chest pain, palpitations, dizziness, syncope, or lower extremity edema. He notes moderate bruising from DAPT.      PAST MEDICAL HISTORY:  Past Medical History:   Diagnosis Date    Acute postoperative pain 09/11/2013    Alpha-1-antitrypsin deficiency (H)     Arthritis     Basal cell carcinoma     CMV (cytomegalovirus infection) (H)     Reacttivation Sept 2013 when valcyte held    DVT of upper extremity (deep vein thrombosis) (H) 09/2013    Nonocclusive thrombosis extending from the right subclavian vein to the right axillary vein,  Segmental occlusion of right basilic vein in the upper arm. 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; construction     Employer: [employer]   Tobacco Use    Smoking status: Former     Packs/day: 2.00     Years: 15.00     Additional pack years: 0.00     Total pack years: 30.00     Types: Cigarettes     Quit date: 1986     Years since quittin.0     Passive exposure: Past    Smokeless tobacco: Never   Vaping Use    Vaping Use: Never used   Substance and Sexual Activity    Alcohol use: No     Alcohol/week: 0.0 standard drinks of alcohol    Drug use: No    Sexual activity: Yes     Partners: Female       CURRENT MEDICATIONS:  acetaminophen (TYLENOL) 325 MG tablet, Take 2 tablets (650 mg) by mouth every 6 hours as needed for mild pain  albuterol (PROAIR HFA/PROVENTIL HFA/VENTOLIN HFA) 108 (90 Base) MCG/ACT inhaler, Inhale 1-2 puffs into the  lungs every 6 hours as needed for shortness of breath or wheezing  amLODIPine (NORVASC) 10 MG tablet, Take 1 tablet (10 mg) by mouth daily  aspirin (ASA) 81 MG EC tablet, Take 1 tablet (81 mg) by mouth daily  azaTHIOprine (IMURAN) 50 MG tablet, Take 1 tablet (50 mg) by mouth daily  azithromycin (ZITHROMAX) 250 MG tablet, Take 250 mg by mouth Every Mon, Wed, Fri Morning  blood glucose (NO BRAND SPECIFIED) test strip, Use to test blood sugar 3 times daily. Dispense as covered by insurance. Dx. Code: E11.9. Preferred brand: Contour Next.  Calcium Carbonate-Vitamin D 600-10 MG-MCG TABS, Take 1 tablet by mouth 2 times daily (with meals)  carvedilol (COREG) 6.25 MG tablet, TAKE 1 TABLET (6.25 MG) BY MOUTH 2 TIMES DAILY (WITH MEALS)  clopidogrel (PLAVIX) 75 MG tablet, Take 1 tablet (75 mg) by mouth daily  dapsone (ACZONE) 25 MG tablet, Take 2 tablets (50 mg) by mouth daily  econazole nitrate 1 % external cream, APPLY TOPICALLY DAILY TO FEET AND HEELS.  fludrocortisone (FLORINEF) 0.1 MG tablet, Take 1 tablet (0.1 mg) by mouth daily  fluticasone-salmeterol (ADVAIR-HFA) 230-21 MCG/ACT inhaler, Inhale 2 puffs into the lungs 2 times daily  furosemide (LASIX) 20 MG tablet, Take 1 tablet (20 mg) by mouth daily  insulin aspart (NOVOLOG PEN) 100 UNIT/ML pen, Take 5 U am, 3 unit(s) non, 5 unit(s) pm of insulin within 30 minutes of start of breakfast, lunch, and dinner. Do not give if blood sugar is less than 70 mg/dl.  insulin glargine (LANTUS PEN) 100 UNIT/ML pen, Inject 18 Units Subcutaneous every morning (before breakfast)  insulin pen needle (32G X 4 MM) 32G X 4 MM miscellaneous, Use 4 pen needles daily or as directed. Dispense as insurance allows. Dx. Code: E09.9  Lancet Devices (MICROLET NEXT LANCING DEVICE) MISC, USE AS DIRECTED  lisinopril (ZESTRIL) 40 MG tablet, Take 40 mg by mouth daily  loperamide (IMODIUM) 2 MG capsule, Take 1 capsule (2 mg) by mouth 4 times daily as needed for diarrhea  magnesium oxide (MAG-OX) 400 MG  tablet, Take 1 tablet (400 mg) by mouth 2 times daily  Microlet Lancets MISC, CHECK BLOOD SUGAR FOUR TIMES DAILY E11.9  montelukast (SINGULAIR) 10 MG tablet, Take 1 tablet (10 mg) by mouth every evening  multivitamin, therapeutic (THERA-VIT) TABS, Take 1 tablet by mouth daily  nitroGLYcerin (NITROSTAT) 0.4 MG sublingual tablet, For chest pain place 1 tablet under the tongue every 5 minutes for 3 doses. If symptoms persist 5 minutes after 1st dose call 911.  omeprazole (PRILOSEC) 20 MG DR capsule, Take 1 capsule (20 mg) by mouth 2 times daily (before meals)  order for DME, Equipment being ordered: diabetic shoes  predniSONE (DELTASONE) 5 MG tablet, TAKE ONE TABLET BY MOUTH ONCE DAILY IN THE MORNING AND ONE-HALF TABLET IN THE EVENING  rosuvastatin (CRESTOR) 40 MG tablet, Take 20 mg by mouth Every Mon, Wed, Fri Morning  sildenafil (VIAGRA) 25 MG tablet, Take 1 tablet (25 mg) by mouth as needed (as needed)  tacrolimus (GENERIC) 0.5 MG capsule, Take 1 capsule (0.5 mg) by mouth every evening Total dose: 2 mg in the AM and 2.5 mg in the PM  tacrolimus (GENERIC) 1 MG capsule, Take 2 capsules (2 mg) by mouth 2 times daily Total dose: 2 mg in AM and 2.5 mg in PM    No current facility-administered medications on file prior to visit.      ROS:   Refer to HPI    EXAM:  There were no vitals taken for this visit.  GENERAL: alert and no distress  EYES: Eyes grossly normal to inspection.  No discharge or erythema, or obvious scleral/conjunctival abnormalities.  RESP: No audible wheeze, cough, or visible cyanosis.    SKIN: Visible skin clear. No significant rash, abnormal pigmentation or lesions.  NEURO: Cranial nerves grossly intact.  Mentation and speech appropriate for age.  PSYCH: Appropriate affect, tone, and pace of words      Labs, reviewed with patient in clinic today:  CBC RESULTS:  Lab Results   Component Value Date    WBC 11.0 01/12/2024    WBC 6.2 06/03/2021    RBC 3.39 (L) 01/12/2024    RBC 3.83 (L) 06/03/2021    HGB  "11.3 (L) 01/12/2024    HGB 13.0 (L) 06/03/2021    HCT 35.9 (L) 01/12/2024    HCT 40.1 06/03/2021     (H) 01/12/2024     (H) 06/03/2021    MCH 33.3 (H) 01/12/2024    MCH 33.9 (H) 06/03/2021    MCHC 31.5 01/12/2024    MCHC 32.4 06/03/2021    RDW 13.9 01/12/2024    RDW 13.1 06/03/2021     01/12/2024     06/03/2021       CMP RESULTS:  Lab Results   Component Value Date     01/17/2024     06/03/2021    POTASSIUM 4.5 01/17/2024    POTASSIUM 4.5 10/26/2022    POTASSIUM 4.6 06/03/2021    CHLORIDE 106 01/17/2024    CHLORIDE 110 (H) 11/09/2022    CHLORIDE 105 06/03/2021    CO2 25 01/17/2024    CO2 28 10/26/2022    CO2 26 06/03/2021    ANIONGAP 11 01/17/2024    ANIONGAP 6 10/26/2022    ANIONGAP 9 06/03/2021    GLC 68 (L) 01/17/2024     (H) 01/12/2024    GLC 92 10/26/2022    GLC 96 06/03/2021    BUN 42.2 (H) 01/17/2024    BUN 36 (H) 10/26/2022    BUN 38 (H) 06/03/2021    CR 1.28 (H) 01/17/2024    CR 1.29 06/03/2021    GFRESTIMATED 60 (L) 01/17/2024    GFRESTIMATED 59 (L) 07/21/2022    GFRESTIMATED 56 (L) 06/03/2021    GFRESTBLACK 67 06/03/2021    ERIN 9.3 01/17/2024    ERIN 8.9 06/03/2021    BILITOTAL 0.7 01/12/2024    BILITOTAL 0.3 05/12/2021    ALBUMIN 3.6 01/12/2024    ALBUMIN 3.9 10/12/2022    ALBUMIN 3.8 05/12/2021    ALKPHOS 57 01/12/2024    ALKPHOS 38 05/12/2021    ALT 46 01/12/2024    ALT 55 (H) 05/12/2021    AST 37 01/12/2024    AST 43 (H) 05/12/2021        INR RESULTS:  Lab Results   Component Value Date    INR 1.03 01/09/2024    INR 1.00 10/30/2020       Lab Results   Component Value Date    MAG 1.9 01/12/2024    MAG 1.8 (L) 06/03/2021     Lab Results   Component Value Date    NTBNPI 212 (H) 02/24/2019     No results found for: \"NTBNP\"    LIPIDS:  Lab Results   Component Value Date    CHOL 134 01/11/2024    CHOL 143 10/30/2020     Lab Results   Component Value Date    HDL 38 01/11/2024    HDL 46 10/30/2020     Lab Results   Component Value Date    LDL 60 01/11/2024    LDL " 62 10/30/2020     Lab Results   Component Value Date    TRIG 179 2024    TRIG 175 10/30/2020     Lab Results   Component Value Date    CHOLHDLRATIO 4.2 10/06/2014       Echocardiogram:  Recent Results (from the past 4320 hour(s))   Echocardiogram Complete   Result Value    LVEF  55-60%    [facility]    [id number]  [id number]  [id number]  [id number]^JOSLYN^VINNY     [facility]  Echocardiography Laboratory  [address] [place] [id number]     Name: YARED HAMLIN  MRN: [id number]  : 1955  Study Date: 2024 09:15 AM  Age: 68 yrs  Gender: Male  Patient Location: [facility] [place]  Reason For Study: Unstable Angina  Ordering Physician: VINNY JORGENSEN  Referring Physician: CARLOS MC  Performed By: Abe Marino RDCS     BSA: 1.9 m2  Height: 68 in  Weight: 162 lb  HR: 73  BP: 137/84 mmHg  ______________________________________________________________________________  Procedure  Complete Portable Echo Adult. Contrast Optison. Optison (NDC #4036-5895-86)  given intravenously. Patient was given 6 ml mixture of 3 ml Optison and 6 ml  saline. 3 ml wasted.  ______________________________________________________________________________  Interpretation Summary  Global and regional left ventricular function is normal with an EF of 55-60%.  The right ventricle is normal size. Global right ventricular function is  normal.  No significant valvular disease.  No pericardial effusion.  Normal IVC.     This study was compared with the study from 2012. There has been no  change.  ______________________________________________________________________________  Left Ventricle  Left ventricular size is normal. Global and regional left ventricular function  is normal with an EF of 55-60%. Mild concentric wall thickening consistent  with left ventricular hypertrophy is present. Left ventricular diastolic  function is indeterminate. Diastolic Doppler findings (E/E' ratio and/or  other  parameters) suggest left ventricular filling pressures are normal. No regional  wall motion abnormalities are seen.     Right Ventricle  Global right ventricular function is normal. The right ventricle is normal  size.     Atria  The right atria appears normal. Moderate left atrial enlargement is present.     Mitral Valve  The mitral valve is normal.     Aortic Valve  Aortic valve is normal in structure and function. The aortic valve is  tricuspid.     Tricuspid Valve  The tricuspid valve is normal. The peak velocity of the tricuspid regurgitant  jet is not obtainable. Pulmonary artery systolic pressure cannot be assessed.     Pulmonic Valve  The pulmonic valve is normal.     Vessels  The inferior vena cava was normal in size with preserved respiratory  variability. The aorta root is normal. The thoracic aorta is normal. Sinuses  of Valsalva 3.5 cm. Ascending aorta 3.5 cm.     Pericardium  Prominent epicardial fat is noted. No pericardial effusion is present.     Compared to Previous Study  This study was compared with the study from 2012 . There has been no  change.     Attestation  I have personally viewed the imaging and agree with the interpretation and  report as documented by the fellow, Jorge Luis Reyes Castro, and/or edited by  me.  ______________________________________________________________________________  MMode/2D Measurements & Calculations  IVSd: 1.2 cm  LVIDd: 5.0 cm  LVIDs: 3.7 cm  LVPWd: 1.2 cm  FS: 27.1 %  LV mass(C)d: 237.5 grams  LV mass(C)dI: 127.1 grams/m2  asc Aorta Diam: 3.5 cm  LVOT diam: 2.8 cm  LVOT area: 6.0 cm2  Asc Ao diam index BSA (cm/m2): 1.9  Asc Ao diam index Ht(cm/m): 2.0     LA Volume Index (BP): 43.9 ml/m2  RWT: 0.47  TAPSE: 1.4 cm     Doppler Measurements & Calculations  MV E max chriss: 38.2 cm/sec  MV A max chriss: 67.8 cm/sec  MV E/A: 0.56  MV dec slope: 223.9 cm/sec2  MV dec time: 0.17 sec  PA acc time: 0.10 sec  E/E' av.5  Lateral E/e': 10.1  Medial E/e': 5.0      ______________________________________________________________________________  Report approved by: Rach Pastor 01/11/2024 11:04 AM             Coronary Angiogram 1/11/2024:  Physicians    Panel Physicians Referring Physician Case Authorizing Physician   Osiel Ott MD (Primary) Silke Ward PARachelC Yoko Ovalles APRN CNP Basrawala, Hussain Z, MD (Fellow - Assisting)     Edwin Sam MD (Fellow - Assisting)       Procedures    Panel 1    Primary Surgeon: Osiel Ott MD   Procedure: Coronary Angiogram    Percutaneous Coronary Intervention        Indications    Unstable angina (H) [I20.0 (ICD-10-CM)]     Comments/Patient Narrative    The patient is a 67 yo M with PMHx of A1AT deficiency s/p bilateral lung transplant (9/8/2013) c/b 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  He presents to the cath lab today for a coronary angiogram +/- PCI in the setting of possible NSTEMI.     Pre Procedure Diagnosis    worsening angina       Conclusion         Prox LAD lesion is 80% stenosed.    RPDA lesion is 40% stenosed.    Mid RCA lesion is 60% stenosed.     Severe multi-vessel disease with proximal to mid 80% stenosis in the LAD, and a hemodynamically significant (iFR = 0.86) heavily calcified stenosis in the mid-RCA.   Successful IVUS guided PCI of the proximal to mid-LAD with placement of a Synergy XD 3.5 x 24 mm MEGHNA, with excellent angiographic result and JOSE GUADALUPE 3 flow.   Heavily calcified left common femoral artery which is not suitable for closure with device.          Plan     Follow bedrest per protocol   Continued medical management and lifestyle modifications for cardiovascular risk factor optimizations.   Follow up visit with Nurse Practitioner in 1-2 weeks.   Discontinue tobacco  smokingDiscontinue smoking   Return to the primary inpatient team for further evaluation and managmenet        1. Continue bivalirudin gtt at fixed rate of 1.75 mg/kg/hr for 4 hours. Stop and check a PTT after 2 hours. If <180, remove sheath, followed by 6 hours of bedrest.   2. Continue ticagrelor 90 mg twice daily for at least 12 months and Eliquis (start tomorrow evening) indefinitely given history of PE/DVT.   3. Will plan for staged PCI of the mid-RCA in 3-4 weeks once his renal function has stabilized.     Recommendations    General Recommendations:  - Patient given specific instructions regarding care of arteriotomy site, activity restrictions, signs and symptoms of cardiac or vascular complications and to seek immediate medical evaluation should they occur.   - Follow up visit with Nurse Practitioner in 1-2 weeks.   - Discontinue tobacco smoking.       Medications:  - Continue dual antiplatelet therapy for 12 month(s).   - Continue high dose statin therapy indefinitely.   - Risk factor management for atherosclerosis.     Coronary Findings    Diagnostic  Dominance: Right  Left Main   The vessel was visualized by selective angiography and is moderate in size. There was 0% vessel disease.      Left Anterior Descending   The vessel was visualized by selective angiography and is moderate in size. There was moderately calcified diffuse vessel disease.   Prox LAD lesion is 80% stenosed. The lesion is severely calcified. The lesion was not previously treated. The stenosis was measured by a visual reading.      First Diagonal Branch   The vessel is small.      First Septal Branch   The vessel is small.      Second Diagonal Branch   The vessel is moderate in size.      Second Septal Branch   The vessel is small.      Left Circumflex   The vessel was visualized by selective angiography and is moderate in size. There was diffuse vessel disease.      First Obtuse Marginal Branch   The vessel is small.      Second Obtuse  Marginal Branch   The vessel is small. The vessel exhibits minimal luminal irregularities.      Third Obtuse Marginal Branch   The vessel is small.      Right Coronary Artery   The vessel was visualized by selective angiography and is moderate in size. There was severely calcified diffuse vessel disease.   Mid RCA lesion is 60% stenosed. The lesion is severely calcified. Pressure wire/iFR used. Pre adenosine administration IFR: 0.86. The RCA was engaged with a 6 Fr JR 4 GC. We attempted to initially cross the mid-RCA calcified lesion with a Opsense wire however this was unable to cross. We subsequently used a Runthrough wire as a florencia wire which allowed for enough guide stablitiy to faciliate delivery of the Opsense wire distal to the mid-vessel lesion. The lesion was found to be hemodynamically significant with an iFR of 0.86, however, as we had reached the contrast limit for the procedure, we opted to stage such an intervention for the future once his renal function has recovered.      Acute Marginal Branch   The vessel is small.      Right Ventricular Branch   The vessel is small.      Right Posterior Descending Artery   The vessel is moderate in size.   RPDA lesion is 40% stenosed.      Right Posterior Atrioventricular Artery   The vessel is small.      First Right Posterolateral Branch   The vessel is small.      Second Right Posterolateral Branch   The vessel is small.      Third Right Posterolateral Branch   The vessel is small.         Intervention     Prox LAD lesion   Stent   Lesion length: 18 mm. The pre-interventional distal flow is normal (JOSE GUADALUPE 3). A stent was successfully placed. The post-interventional distal flow is normal (JOSE GUADALUPE 3). The LMCA was engaged with a 6 Fr XB 3.5 GC. The lesion in the proximal to mid-LAD was crossed with a Runthrough wire. Pre-dilation was performed with a 2.5 mm and 3.0 mm semi-compliant balloon. A Synergy XD 3.5 x 24 mm MEGHNA was then deployed in the proximal to mid-LAD.  IVUS was used to assess the stent which required additional post-dilation with a 3.5 mm NC balloon. There was an excellent angiographic result and JOSE GUADALUPE 3 flow.   There is a 0% residual stenosis post intervention.             Assessment and Plan:   Mr. 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     # CAD s/p MEGHNA x1 to LAD (1/11/24)  # Dyslipidemia  ED visit 1/11 for exertional chest pain, troponins flat, EKG with no changes. Given risk factors of DM, HLD, and HTN, taken to cath lab for invasive angiogram., which revealed severe multivessel disease. MEGHNA x1 to LAD with plan to return for staged PCI of RCA in 3-4 weeks to allow for renal function to recover.  Most recent LDL 60  - DAPT: asa 81mg + Plavix 75mg daily  - Rosuvastatin 20mg MWF     # HTN  - cont amlodipine 10mg daily  - carvedilol 6.25mg BID  - lisinopril 40mg every day    CKD III  Baseline creatinine appears to be 0.9-1.1, brief bump after angiogram to 1.28  - recheck during angiogram    Follow up:  after angiogram  Chart review time today: 10 minutes  Visit time today: 11 minutes  Total time spent today: 21 minutes      BRANDI KNOX CNP  General Cardiology   01/24/24

## 2024-01-19 ENCOUNTER — HOSPITAL ENCOUNTER (OUTPATIENT)
Dept: MRI IMAGING | Facility: OTHER | Age: 69
Discharge: HOME | End: 2024-01-19
Attending: INTERNAL MEDICINE | Admitting: INTERNAL MEDICINE
Payer: MEDICARE

## 2024-01-19 ENCOUNTER — TRANSFERRED RECORDS (OUTPATIENT)
Dept: HEALTH INFORMATION MANAGEMENT | Facility: CLINIC | Age: 69
End: 2024-01-19
Payer: MEDICARE

## 2024-01-19 DIAGNOSIS — I10 ESSENTIAL HYPERTENSION: Chronic | ICD-10-CM

## 2024-01-19 DIAGNOSIS — K86.2 PANCREATIC CYST: ICD-10-CM

## 2024-01-19 PROCEDURE — 74183 MRI ABD W/O CNTR FLWD CNTR: CPT | Mod: MG

## 2024-01-19 PROCEDURE — A9575 INJ GADOTERATE MEGLUMI 0.1ML: HCPCS | Mod: JZ | Performed by: INTERNAL MEDICINE

## 2024-01-19 PROCEDURE — 255N000002 HC RX 255 OP 636: Mod: JZ | Performed by: INTERNAL MEDICINE

## 2024-01-19 RX ORDER — GADOTERATE MEGLUMINE 376.9 MG/ML
15 INJECTION INTRAVENOUS ONCE
Status: COMPLETED | OUTPATIENT
Start: 2024-01-19 | End: 2024-01-19

## 2024-01-19 RX ADMIN — GADOTERATE MEGLUMINE 14 ML: 376.9 INJECTION INTRAVENOUS at 08:42

## 2024-01-22 ENCOUNTER — VIRTUAL VISIT (OUTPATIENT)
Dept: CARDIOLOGY | Facility: CLINIC | Age: 69
End: 2024-01-22
Attending: INTERNAL MEDICINE
Payer: MEDICARE

## 2024-01-22 VITALS
SYSTOLIC BLOOD PRESSURE: 144 MMHG | HEART RATE: 91 BPM | DIASTOLIC BLOOD PRESSURE: 80 MMHG | BODY MASS INDEX: 24.25 KG/M2 | HEIGHT: 68 IN | TEMPERATURE: 98 F | WEIGHT: 160 LBS

## 2024-01-22 DIAGNOSIS — N18.30 STAGE 3 CHRONIC KIDNEY DISEASE, UNSPECIFIED WHETHER STAGE 3A OR 3B CKD (H): ICD-10-CM

## 2024-01-22 DIAGNOSIS — I25.110 CORONARY ARTERY DISEASE INVOLVING NATIVE CORONARY ARTERY OF NATIVE HEART WITH UNSTABLE ANGINA PECTORIS (H): ICD-10-CM

## 2024-01-22 DIAGNOSIS — I20.0 UNSTABLE ANGINA (H): Primary | ICD-10-CM

## 2024-01-22 PROCEDURE — 99213 OFFICE O/P EST LOW 20 MIN: CPT | Mod: 24 | Performed by: CASE MANAGER/CARE COORDINATOR

## 2024-01-22 RX ORDER — POTASSIUM CHLORIDE 1500 MG/1
20 TABLET, EXTENDED RELEASE ORAL
Status: CANCELLED | OUTPATIENT
Start: 2024-01-22

## 2024-01-22 RX ORDER — ASPIRIN 325 MG
325 TABLET ORAL ONCE
Status: CANCELLED | OUTPATIENT
Start: 2024-01-22 | End: 2024-01-22

## 2024-01-22 RX ORDER — POTASSIUM CHLORIDE 1500 MG/1
40 TABLET, EXTENDED RELEASE ORAL
Status: CANCELLED | OUTPATIENT
Start: 2024-01-22

## 2024-01-22 RX ORDER — ASPIRIN 81 MG/1
243 TABLET, CHEWABLE ORAL ONCE
Status: CANCELLED | OUTPATIENT
Start: 2024-01-22

## 2024-01-22 RX ORDER — SODIUM CHLORIDE 9 MG/ML
INJECTION, SOLUTION INTRAVENOUS CONTINUOUS
Status: CANCELLED | OUTPATIENT
Start: 2024-01-22

## 2024-01-22 RX ORDER — LIDOCAINE 40 MG/G
CREAM TOPICAL
Status: CANCELLED | OUTPATIENT
Start: 2024-01-22

## 2024-01-22 ASSESSMENT — PAIN SCALES - GENERAL: PAINLEVEL: NO PAIN (0)

## 2024-01-22 NOTE — TELEPHONE ENCOUNTER
[facility] sent Rx request for the following:      Requested Prescriptions   Pending Prescriptions Disp Refills    carvedilol (COREG) 6.25 MG tablet [Pharmacy Med Name: CARVEDILOL 6.25MG TABLET] 120 tablet 3     Sig: TAKE 1 TABLET (6.25 MG) BY MOUTH 2 TIMES DAILY (WITH MEALS)       Last Prescription Date:   11/21/23  Last Fill Qty/Refills:         120, R-0    Last Office Visit:              6/28/23   Future Office visit:           none    Routing to PCP for refill consideration.  Jelly Paz RN on 1/22/2024 at 2:48 PM

## 2024-01-22 NOTE — LETTER
1/22/2024      RE: Aubrey Duncan  [address]  [address] [place] [id number]       Dear Colleague,    Thank you for the opportunity to participate in the care of your patient, Aubrey Duncan, at the [facility] [place] at [facility]. Please see a copy of my visit note below.    [facility] Cardiology - [facility] [place]   Cardiology Clinic Note      HPI:   Mr. 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 He is seen today via video visit for follow up.    He presented to [facility] on 1/11, reported with left-sided/substernal chest pain with exertion that had been occurring for 1 week, decreased at rest. Troponin 22->23, EKG without ischemic changes. He underwent coronary angiogram 1/11/23 showed severe multivessel disease with 80% stenosis in prox-mid LAD and heavily calcified stenosis in mid-RCA. He underwent PCI x1 to LAD, with plan to return for staged PCI.     Today he denies chest pain, palpitations, dizziness, syncope, or lower extremity edema. He notes moderate bruising from DAPT.      PAST MEDICAL HISTORY:  Past Medical History:   Diagnosis Date    Acute postoperative pain 09/11/2013    Alpha-1-antitrypsin deficiency (H)     Arthritis     Basal cell carcinoma     CMV (cytomegalovirus infection) (H)     Reacttivation Sept 2013 when valcyte held    DVT of upper extremity (deep vein thrombosis) (H) 09/2013    Nonocclusive thrombosis extending from the right subclavian vein to the right axillary vein,  Segmental occlusion of right basilic vein in the upper arm. 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 Mother     Cerebrovascular Disease Father          at age 83 with ministrokes; had arthritis as a farmer    Asthma Sister     Diabetes Sister     Hypertension Sister     Other - See Comments Sister         bleeding disorder    Hypertension Daughter     Other - See Comments Daughter         fibromyalgia    Skin Cancer No family hx of     Melanoma No family hx of     Glaucoma No family hx of     Macular Degeneration No family hx of        SOCIAL HISTORY:  Social History     Socioeconomic History    Marital status:      Spouse name: Kyung    Number of children: 1   Occupational History    Occupation: Sanitation business owner; construction     Employer: [employer]   Tobacco Use    Smoking status: Former     Packs/day: 2.00     Years: 15.00     Additional pack years: 0.00     Total pack years: 30.00     Types: Cigarettes     Quit date: 1986     Years since quittin.0     Passive exposure: Past    Smokeless tobacco: Never   Vaping Use    Vaping Use: Never used   Substance and Sexual Activity    Alcohol use: No     Alcohol/week: 0.0 standard drinks of alcohol    Drug use: No    Sexual activity: Yes     Partners: Female       CURRENT MEDICATIONS:  acetaminophen (TYLENOL) 325 MG tablet, Take 2 tablets (650 mg) by mouth every 6 hours as needed for mild  pain  albuterol (PROAIR HFA/PROVENTIL HFA/VENTOLIN HFA) 108 (90 Base) MCG/ACT inhaler, Inhale 1-2 puffs into the lungs every 6 hours as needed for shortness of breath or wheezing  amLODIPine (NORVASC) 10 MG tablet, Take 1 tablet (10 mg) by mouth daily  aspirin (ASA) 81 MG EC tablet, Take 1 tablet (81 mg) by mouth daily  azaTHIOprine (IMURAN) 50 MG tablet, Take 1 tablet (50 mg) by mouth daily  azithromycin (ZITHROMAX) 250 MG tablet, Take 250 mg by mouth Every Mon, Wed, Fri Morning  blood glucose (NO BRAND SPECIFIED) test strip, Use to test blood sugar 3 times daily. Dispense as covered by insurance. Dx. Code: E11.9. Preferred brand: Contour Next.  Calcium Carbonate-Vitamin D 600-10 MG-MCG TABS, Take 1 tablet by mouth 2 times daily (with meals)  carvedilol (COREG) 6.25 MG tablet, TAKE 1 TABLET (6.25 MG) BY MOUTH 2 TIMES DAILY (WITH MEALS)  clopidogrel (PLAVIX) 75 MG tablet, Take 1 tablet (75 mg) by mouth daily  dapsone (ACZONE) 25 MG tablet, Take 2 tablets (50 mg) by mouth daily  econazole nitrate 1 % external cream, APPLY TOPICALLY DAILY TO FEET AND HEELS.  fludrocortisone (FLORINEF) 0.1 MG tablet, Take 1 tablet (0.1 mg) by mouth daily  fluticasone-salmeterol (ADVAIR-HFA) 230-21 MCG/ACT inhaler, Inhale 2 puffs into the lungs 2 times daily  furosemide (LASIX) 20 MG tablet, Take 1 tablet (20 mg) by mouth daily  insulin aspart (NOVOLOG PEN) 100 UNIT/ML pen, Take 5 U am, 3 unit(s) non, 5 unit(s) pm of insulin within 30 minutes of start of breakfast, lunch, and dinner. Do not give if blood sugar is less than 70 mg/dl.  insulin glargine (LANTUS PEN) 100 UNIT/ML pen, Inject 18 Units Subcutaneous every morning (before breakfast)  insulin pen needle (32G X 4 MM) 32G X 4 MM miscellaneous, Use 4 pen needles daily or as directed. Dispense as insurance allows. Dx. Code: E09.9  Lancet Devices (MICROLET NEXT LANCING DEVICE) MISC, USE AS DIRECTED  lisinopril (ZESTRIL) 40 MG tablet, Take 40 mg by mouth daily  loperamide (IMODIUM) 2  MG capsule, Take 1 capsule (2 mg) by mouth 4 times daily as needed for diarrhea  magnesium oxide (MAG-OX) 400 MG tablet, Take 1 tablet (400 mg) by mouth 2 times daily  Microlet Lancets MISC, CHECK BLOOD SUGAR FOUR TIMES DAILY E11.9  montelukast (SINGULAIR) 10 MG tablet, Take 1 tablet (10 mg) by mouth every evening  multivitamin, therapeutic (THERA-VIT) TABS, Take 1 tablet by mouth daily  nitroGLYcerin (NITROSTAT) 0.4 MG sublingual tablet, For chest pain place 1 tablet under the tongue every 5 minutes for 3 doses. If symptoms persist 5 minutes after 1st dose call 911.  omeprazole (PRILOSEC) 20 MG DR capsule, Take 1 capsule (20 mg) by mouth 2 times daily (before meals)  order for DME, Equipment being ordered: diabetic shoes  predniSONE (DELTASONE) 5 MG tablet, TAKE ONE TABLET BY MOUTH ONCE DAILY IN THE MORNING AND ONE-HALF TABLET IN THE EVENING  rosuvastatin (CRESTOR) 40 MG tablet, Take 20 mg by mouth Every Mon, Wed, Fri Morning  sildenafil (VIAGRA) 25 MG tablet, Take 1 tablet (25 mg) by mouth as needed (as needed)  tacrolimus (GENERIC) 0.5 MG capsule, Take 1 capsule (0.5 mg) by mouth every evening Total dose: 2 mg in the AM and 2.5 mg in the PM  tacrolimus (GENERIC) 1 MG capsule, Take 2 capsules (2 mg) by mouth 2 times daily Total dose: 2 mg in AM and 2.5 mg in PM    No current facility-administered medications on file prior to visit.      ROS:   Refer to HPI    EXAM:  There were no vitals taken for this visit.  GENERAL: alert and no distress  EYES: Eyes grossly normal to inspection.  No discharge or erythema, or obvious scleral/conjunctival abnormalities.  RESP: No audible wheeze, cough, or visible cyanosis.    SKIN: Visible skin clear. No significant rash, abnormal pigmentation or lesions.  NEURO: Cranial nerves grossly intact.  Mentation and speech appropriate for age.  PSYCH: Appropriate affect, tone, and pace of words      Labs, reviewed with patient in clinic today:  CBC RESULTS:  Lab Results   Component Value  "Date    WBC 11.0 01/12/2024    WBC 6.2 06/03/2021    RBC 3.39 (L) 01/12/2024    RBC 3.83 (L) 06/03/2021    HGB 11.3 (L) 01/12/2024    HGB 13.0 (L) 06/03/2021    HCT 35.9 (L) 01/12/2024    HCT 40.1 06/03/2021     (H) 01/12/2024     (H) 06/03/2021    MCH 33.3 (H) 01/12/2024    MCH 33.9 (H) 06/03/2021    MCHC 31.5 01/12/2024    MCHC 32.4 06/03/2021    RDW 13.9 01/12/2024    RDW 13.1 06/03/2021     01/12/2024     06/03/2021       CMP RESULTS:  Lab Results   Component Value Date     01/17/2024     06/03/2021    POTASSIUM 4.5 01/17/2024    POTASSIUM 4.5 10/26/2022    POTASSIUM 4.6 06/03/2021    CHLORIDE 106 01/17/2024    CHLORIDE 110 (H) 11/09/2022    CHLORIDE 105 06/03/2021    CO2 25 01/17/2024    CO2 28 10/26/2022    CO2 26 06/03/2021    ANIONGAP 11 01/17/2024    ANIONGAP 6 10/26/2022    ANIONGAP 9 06/03/2021    GLC 68 (L) 01/17/2024     (H) 01/12/2024    GLC 92 10/26/2022    GLC 96 06/03/2021    BUN 42.2 (H) 01/17/2024    BUN 36 (H) 10/26/2022    BUN 38 (H) 06/03/2021    CR 1.28 (H) 01/17/2024    CR 1.29 06/03/2021    GFRESTIMATED 60 (L) 01/17/2024    GFRESTIMATED 59 (L) 07/21/2022    GFRESTIMATED 56 (L) 06/03/2021    GFRESTBLACK 67 06/03/2021    ERIN 9.3 01/17/2024    ERIN 8.9 06/03/2021    BILITOTAL 0.7 01/12/2024    BILITOTAL 0.3 05/12/2021    ALBUMIN 3.6 01/12/2024    ALBUMIN 3.9 10/12/2022    ALBUMIN 3.8 05/12/2021    ALKPHOS 57 01/12/2024    ALKPHOS 38 05/12/2021    ALT 46 01/12/2024    ALT 55 (H) 05/12/2021    AST 37 01/12/2024    AST 43 (H) 05/12/2021        INR RESULTS:  Lab Results   Component Value Date    INR 1.03 01/09/2024    INR 1.00 10/30/2020       Lab Results   Component Value Date    MAG 1.9 01/12/2024    MAG 1.8 (L) 06/03/2021     Lab Results   Component Value Date    NTBNPI 212 (H) 02/24/2019     No results found for: \"NTBNP\"    LIPIDS:  Lab Results   Component Value Date    CHOL 134 01/11/2024    CHOL 143 10/30/2020     Lab Results   Component Value " Date    HDL 38 2024    HDL 46 10/30/2020     Lab Results   Component Value Date    LDL 60 2024    LDL 62 10/30/2020     Lab Results   Component Value Date    TRIG 179 2024    TRIG 175 10/30/2020     Lab Results   Component Value Date    CHOLHDLRATIO 4.2 10/06/2014       Echocardiogram:  Recent Results (from the past 4320 hour(s))   Echocardiogram Complete   Result Value    LVEF  55-60%    Narrative    [id number]  [id number]  [id number]  [id number]^JOSLYN^VINNY     [facility],[place]  Echocardiography Laboratory  [address] [place] [address]     Name: YARED HAMLIN  MRN: [id number]  : 1955  Study Date: 2024 09:15 AM  Age: 68 yrs  Gender: Male  [place] [facility] [place]  Reason For Study: Unstable Angina  Ordering Physician: VINNY JORGENSEN  Referring Physician: CARLOS MC  Performed By: Abe Marino RDCS     BSA: 1.9 m2  Height: 68 in  Weight: 162 lb  HR: 73  BP: 137/84 mmHg  ______________________________________________________________________________  Procedure  Complete Portable Echo Adult. Contrast Optison. Optison (NDC #5141-2689-75)  given intravenously. Patient was given 6 ml mixture of 3 ml Optison and 6 ml  saline. 3 ml wasted.  ______________________________________________________________________________  Interpretation Summary  Global and regional left ventricular function is normal with an EF of 55-60%.  The right ventricle is normal size. Global right ventricular function is  normal.  No significant valvular disease.  No pericardial effusion.  Normal IVC.     This study was compared with the study from 2012. There has been no  change.  ______________________________________________________________________________  Left Ventricle  Left ventricular size is normal. Global and regional left ventricular function  is normal with an EF of 55-60%. Mild concentric wall thickening consistent  with left ventricular hypertrophy is present.  Left ventricular diastolic  function is indeterminate. Diastolic Doppler findings (E/E' ratio and/or other  parameters) suggest left ventricular filling pressures are normal. No regional  wall motion abnormalities are seen.     Right Ventricle  Global right ventricular function is normal. The right ventricle is normal  size.     Atria  The right atria appears normal. Moderate left atrial enlargement is present.     Mitral Valve  The mitral valve is normal.     Aortic Valve  Aortic valve is normal in structure and function. The aortic valve is  tricuspid.     Tricuspid Valve  The tricuspid valve is normal. The peak velocity of the tricuspid regurgitant  jet is not obtainable. Pulmonary artery systolic pressure cannot be assessed.     Pulmonic Valve  The pulmonic valve is normal.     Vessels  The inferior vena cava was normal in size with preserved respiratory  variability. The aorta root is normal. The thoracic aorta is normal. Sinuses  of Valsalva 3.5 cm. Ascending aorta 3.5 cm.     Pericardium  Prominent epicardial fat is noted. No pericardial effusion is present.     Compared to Previous Study  This study was compared with the study from 7/31/2012 . There has been no  change.     Attestation  I have personally viewed the imaging and agree with the interpretation and  report as documented by the fellow, Jorge Luis Reyes Castro, and/or edited by  me.  ______________________________________________________________________________  MMode/2D Measurements & Calculations  IVSd: 1.2 cm  LVIDd: 5.0 cm  LVIDs: 3.7 cm  LVPWd: 1.2 cm  FS: 27.1 %  LV mass(C)d: 237.5 grams  LV mass(C)dI: 127.1 grams/m2  asc Aorta Diam: 3.5 cm  LVOT diam: 2.8 cm  LVOT area: 6.0 cm2  Asc Ao diam index BSA (cm/m2): 1.9  Asc Ao diam index Ht(cm/m): 2.0     LA Volume Index (BP): 43.9 ml/m2  RWT: 0.47  TAPSE: 1.4 cm     Doppler Measurements & Calculations  MV E max chriss: 38.2 cm/sec  MV A max chriss: 67.8 cm/sec  MV E/A: 0.56  MV dec slope: 223.9  cm/sec2  MV dec time: 0.17 sec  PA acc time: 0.10 sec  E/E' av.5  Lateral E/e': 10.1  Medial E/e': 5.0     ______________________________________________________________________________  Report approved by: Rach Pastor 2024 11:04 AM             Coronary Angiogram 2024:  Physicians    Panel Physicians Referring Physician Case Authorizing Physician   Osiel Ott MD (Primary) Silke Ward, PA-C Yoko Ovalles APRN CNP Basrawala, Hussain Z, MD (Fellow - Assisting)     Edwin Sam MD (Fellow - Assisting)       Procedures    Panel 1    Primary Surgeon: Osiel Ott MD   Procedure: Coronary Angiogram    Percutaneous Coronary Intervention        Indications    Unstable angina (H) [I20.0 (ICD-10-CM)]     Comments/Patient Narrative    The patient is a 69 yo M with PMHx of A1AT deficiency s/p bilateral lung transplant (2013) c/b 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  He presents to the cath lab today for a coronary angiogram +/- PCI in the setting of possible NSTEMI.     Pre Procedure Diagnosis    worsening angina       Conclusion         Prox LAD lesion is 80% stenosed.    RPDA lesion is 40% stenosed.    Mid RCA lesion is 60% stenosed.     Severe multi-vessel disease with proximal to mid 80% stenosis in the LAD, and a hemodynamically significant (iFR = 0.86) heavily calcified stenosis in the mid-RCA.   Successful IVUS guided PCI of the proximal to mid-LAD with placement of a Synergy XD 3.5 x 24 mm MEGHNA, with excellent angiographic result and JOSE GUADALUPE 3 flow.   Heavily calcified left common femoral artery which is not suitable for closure with device.          Plan     Follow bedrest per protocol   Continued medical management and lifestyle modifications for cardiovascular  risk factor optimizations.   Follow up visit with Nurse Practitioner in 1-2 weeks.   Discontinue tobacco smokingDiscontinue smoking   Return to the primary inpatient team for further evaluation and managmenet        1. Continue bivalirudin gtt at fixed rate of 1.75 mg/kg/hr for 4 hours. Stop and check a PTT after 2 hours. If <180, remove sheath, followed by 6 hours of bedrest.   2. Continue ticagrelor 90 mg twice daily for at least 12 months and Eliquis (start tomorrow evening) indefinitely given history of PE/DVT.   3. Will plan for staged PCI of the mid-RCA in 3-4 weeks once his renal function has stabilized.     Recommendations    General Recommendations:  - Patient given specific instructions regarding care of arteriotomy site, activity restrictions, signs and symptoms of cardiac or vascular complications and to seek immediate medical evaluation should they occur.   - Follow up visit with Nurse Practitioner in 1-2 weeks.   - Discontinue tobacco smoking.       Medications:  - Continue dual antiplatelet therapy for 12 month(s).   - Continue high dose statin therapy indefinitely.   - Risk factor management for atherosclerosis.     Coronary Findings    Diagnostic  Dominance: Right  Left Main   The vessel was visualized by selective angiography and is moderate in size. There was 0% vessel disease.      Left Anterior Descending   The vessel was visualized by selective angiography and is moderate in size. There was moderately calcified diffuse vessel disease.   Prox LAD lesion is 80% stenosed. The lesion is severely calcified. The lesion was not previously treated. The stenosis was measured by a visual reading.      First Diagonal Branch   The vessel is small.      First Septal Branch   The vessel is small.      Second Diagonal Branch   The vessel is moderate in size.      Second Septal Branch   The vessel is small.      Left Circumflex   The vessel was visualized by selective angiography and is moderate in size. There  was diffuse vessel disease.      First Obtuse Marginal Branch   The vessel is small.      Second Obtuse Marginal Branch   The vessel is small. The vessel exhibits minimal luminal irregularities.      Third Obtuse Marginal Branch   The vessel is small.      Right Coronary Artery   The vessel was visualized by selective angiography and is moderate in size. There was severely calcified diffuse vessel disease.   Mid RCA lesion is 60% stenosed. The lesion is severely calcified. Pressure wire/iFR used. Pre adenosine administration IFR: 0.86. The RCA was engaged with a 6 Fr JR 4 GC. We attempted to initially cross the mid-RCA calcified lesion with a Opsense wire however this was unable to cross. We subsequently used a Runthrough wire as a florencia wire which allowed for enough guide stablitiy to faciliate delivery of the Opsense wire distal to the mid-vessel lesion. The lesion was found to be hemodynamically significant with an iFR of 0.86, however, as we had reached the contrast limit for the procedure, we opted to stage such an intervention for the future once his renal function has recovered.      Acute Marginal Branch   The vessel is small.      Right Ventricular Branch   The vessel is small.      Right Posterior Descending Artery   The vessel is moderate in size.   RPDA lesion is 40% stenosed.      Right Posterior Atrioventricular Artery   The vessel is small.      First Right Posterolateral Branch   The vessel is small.      Second Right Posterolateral Branch   The vessel is small.      Third Right Posterolateral Branch   The vessel is small.         Intervention     Prox LAD lesion   Stent   Lesion length: 18 mm. The pre-interventional distal flow is normal (JOSE GUADALUPE 3). A stent was successfully placed. The post-interventional distal flow is normal (JOSE GUADALUPE 3). The LMCA was engaged with a 6 Fr XB 3.5 GC. The lesion in the proximal to mid-LAD was crossed with a Runthrough wire. Pre-dilation was performed with a 2.5 mm and 3.0  mm semi-compliant balloon. A Synergy XD 3.5 x 24 mm MEGHNA was then deployed in the proximal to mid-LAD. IVUS was used to assess the stent which required additional post-dilation with a 3.5 mm NC balloon. There was an excellent angiographic result and JOSE GUADALUPE 3 flow.   There is a 0% residual stenosis post intervention.             Assessment and Plan:   Mr. 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     # CAD s/p MEGHNA x1 to LAD (1/11/24)  # Dyslipidemia  ED visit 1/11 for exertional chest pain, troponins flat, EKG with no changes. Given risk factors of DM, HLD, and HTN, taken to cath lab for invasive angiogram., which revealed severe multivessel disease. MEGHNA x1 to LAD with plan to return for staged PCI of RCA in 3-4 weeks to allow for renal function to recover.  Most recent LDL 60  - DAPT: asa 81mg + Plavix 75mg daily  - Rosuvastatin 20mg MWF     # HTN  - cont amlodipine 10mg daily  - carvedilol 6.25mg BID  - lisinopril 40mg every day    CKD III  Baseline creatinine appears to be 0.9-1.1, brief bump after angiogram to 1.28  - recheck during angiogram    Follow up:  after angiogram  Chart review time today: 10 minutes  Visit time today: 11 minutes  Total time spent today: 21 minutes      BRANDI KNOX CNP  General Cardiology   01/24/24

## 2024-01-22 NOTE — NURSING NOTE
Is the patient currently in the state of [place]? YES    Visit mode:VIDEO    If the visit is dropped, the patient can be reconnected by: VIDEO VISIT: Text to cell phone:   Telephone Information:   Mobile [phone]    and VIDEO VISIT: Send to e-mail at: [email or web address]    Will anyone else be joining the visit? Pts wife  (If patient encounters technical issues they should call [phone])    How would you like to obtain your AVS? MyChart    Are changes needed to the allergy or medication list? No    Patient declined individual allergy and medication review by support staff because patient denies any changes since echeck-in completion and states all information entered during echeck-in remains accurate.    Reason for visit: SALLIE Cordoba MA VVF

## 2024-01-23 ENCOUNTER — HOSPITAL ENCOUNTER (OUTPATIENT)
Dept: CARDIAC REHAB | Facility: HOSPITAL | Age: 69
Discharge: HOME | End: 2024-01-23
Attending: INTERNAL MEDICINE
Payer: MEDICARE

## 2024-01-23 PROCEDURE — 93668 PERIPHERAL VASCULAR REHAB: CPT

## 2024-01-23 PROCEDURE — 999N000124 HC STATISTIC PAD/SET VISIT

## 2024-01-23 RX ORDER — CARVEDILOL 6.25 MG/1
6.25 TABLET ORAL 2 TIMES DAILY WITH MEALS
Qty: 120 TABLET | Refills: 3 | Status: SHIPPED | OUTPATIENT
Start: 2024-01-23

## 2024-01-24 NOTE — PATIENT INSTRUCTIONS
You were seen today in the Cardiovascular Clinic at the [facility] by:       BRANDI CARRERA CNP    Your visit summary and instructions are as follows:    No medication changes  Schedule angiogram      Return to cardiology clinic after angiogram     Thank you for your visit today!     Please MyChart message me or call my nurse if you have any questions or concerns.      During Business Hours:  [phone], option # 1 (University) then option # 4 (medical questions) and ask to speak with my nurse.     After hours, weekends or holidays:   [phone], Option #4  Ask to speak to the On-Call Cardiologist. Inform them you are a cardiology patient at the [facility].

## 2024-01-25 ENCOUNTER — HOSPITAL ENCOUNTER (OUTPATIENT)
Dept: CARDIAC REHAB | Facility: HOSPITAL | Age: 69
Discharge: HOME | End: 2024-01-25
Attending: INTERNAL MEDICINE
Payer: MEDICARE

## 2024-01-25 PROCEDURE — 93668 PERIPHERAL VASCULAR REHAB: CPT

## 2024-01-25 PROCEDURE — 999N000124 HC STATISTIC PAD/SET VISIT

## 2024-01-29 NOTE — PROGRESS NOTES
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     He was seen by Dr. Sandy in .  At that time, after his transplant, he developed heparin-induced thrombocytopenia which was complicated by a right upper extremity DVT.  He had a PICC line in place at that time as well.  He was from known to be heterozygous for the prothrombin gene mutation at that time.  However he had not had previous episodes of thrombosis.     He does not appear to have had other episodes of VTE until mid ,  described below.    His significant peripheral arterial disease appears to have come to medical attention in 2022.  He developed heatstroke after working outside all day, as well as right lower extremity pain.  He was found to be hypotensive and received a significant amount of fluids.    To evaluate his peripheral arterial disease further, he underwent lower extremity arterial ultrasounds which showed extensive right lower extremity arterial occlusions, including the right profunda femoris, and anterior and posterior tibial arteries. He then underwent CT angiograms as part of a work-up for peripheral arterial disease, and was incidentally found to have a right lower lobe pulmonary embolism as well as segmental and subsegmental PEs of  the bilateral lower lobes.  Lower extremity ultrasounds showed DVTs in the right lower extremity, in particular in the tibial veins and both peroneal veins.  There was no DVT in the left lower extremity. He was started on anticoagulation at that time.    He has difficulty with ambulation at baseline due to his peripheral arterial disease as well as neuropathy.  He is tolerating Eliquis well, without bleeding or bruising issues.  He is overdue for colonoscopy.  No fevers, night sweats, weight loss, rashes, adenopathy.  He has had no blood clots other than the episode in 2013 and the most recent episode.  He denies long trips, surgery or immobilization prior to his hospital presentation for heatstroke in June.  Lower extremity ultrasound done during that presentation (which overall only lasted 1 day) was negative for DVT.    January 17, 2023: Completed 6 months of therapeutic apixaban.      May 9, 2023: Changed to 2.5 mg apixaban. Stopped this due to cost. On ASA and plavix. Some discoloration of toes noted, evaluated by vascular surgery, attributed to overly tight wrapping.  Notes no issues with bleeding or bruising.  No lower extremity edema or dyspnea.    August 22, 2023: CT chest and lower extremity ultrasounds without any evidence of VTEs.  Underwent EUS of pancreatic head mass without evidence of malignancy. Feeling well, no bleeding or bruising. Struggling with lower extremity claudicate of symptoms when walking, relieved by rest.    January 30, 2024: Continuing on dual antiplatelet therapy alone rather than anticoagulation.  Recently admitted for chest pain without any etiology found. Has received multiple cardiac stents in the interim.  Feeling okay currently, no bleeding or bruising issues.      Medications:  Current Outpatient Medications   Medication Sig Dispense Refill    acetaminophen (TYLENOL) 325 MG tablet Take 2 tablets (650 mg) by mouth every 6 hours as needed for mild pain 60 tablet 0    albuterol  (PROAIR HFA/PROVENTIL HFA/VENTOLIN HFA) 108 (90 Base) MCG/ACT inhaler Inhale 1-2 puffs into the lungs every 6 hours as needed for shortness of breath or wheezing 8.5 g 3    amLODIPine (NORVASC) 10 MG tablet Take 1 tablet (10 mg) by mouth daily 90 tablet 3    aspirin (ASA) 81 MG EC tablet Take 1 tablet (81 mg) by mouth daily 90 tablet 3    azaTHIOprine (IMURAN) 50 MG tablet Take 1 tablet (50 mg) by mouth daily 30 tablet 11    azithromycin (ZITHROMAX) 250 MG tablet Take 250 mg by mouth Every Mon, Wed, Fri Morning      blood glucose (NO BRAND SPECIFIED) test strip Use to test blood sugar 3 times daily. Dispense as covered by insurance. Dx. Code: E11.9. Preferred brand: Contour Next. 300 strip 11    Calcium Carbonate-Vitamin D 600-10 MG-MCG TABS Take 1 tablet by mouth 2 times daily (with meals) 60 tablet 11    carvedilol (COREG) 6.25 MG tablet TAKE 1 TABLET (6.25 MG) BY MOUTH 2 TIMES DAILY (WITH MEALS) 120 tablet 3    clopidogrel (PLAVIX) 75 MG tablet Take 1 tablet (75 mg) by mouth daily 90 tablet 3    dapsone (ACZONE) 25 MG tablet Take 2 tablets (50 mg) by mouth daily 180 tablet 3    econazole nitrate 1 % external cream APPLY TOPICALLY DAILY TO FEET AND HEELS. 85 g 3    fludrocortisone (FLORINEF) 0.1 MG tablet Take 1 tablet (0.1 mg) by mouth daily 90 tablet 3    fluticasone-salmeterol (ADVAIR-HFA) 230-21 MCG/ACT inhaler Inhale 2 puffs into the lungs 2 times daily 8 g 11    furosemide (LASIX) 20 MG tablet Take 1 tablet (20 mg) by mouth daily 90 tablet 4    insulin aspart (NOVOLOG PEN) 100 UNIT/ML pen Take 5 U am, 3 unit(s) non, 5 unit(s) pm of insulin within 30 minutes of start of breakfast, lunch, and dinner. Do not give if blood sugar is less than 70 mg/dl.      insulin glargine (LANTUS PEN) 100 UNIT/ML pen Inject 18 Units Subcutaneous every morning (before breakfast)      insulin pen needle (32G X 4 MM) 32G X 4 MM miscellaneous Use 4 pen needles daily or as directed. Dispense as insurance allows. Dx. Code: E09.9 400  each 11    Lancet Devices (MICROLET NEXT LANCING DEVICE) MISC USE AS DIRECTED 1 each 3    lisinopril (ZESTRIL) 40 MG tablet Take 40 mg by mouth daily      loperamide (IMODIUM) 2 MG capsule Take 1 capsule (2 mg) by mouth 4 times daily as needed for diarrhea 120 capsule 12    magnesium oxide (MAG-OX) 400 MG tablet Take 1 tablet (400 mg) by mouth 2 times daily 180 tablet 3    Microlet Lancets MISC CHECK BLOOD SUGAR FOUR TIMES DAILY E11.9 400 each 1    montelukast (SINGULAIR) 10 MG tablet Take 1 tablet (10 mg) by mouth every evening 90 tablet 3    multivitamin, therapeutic (THERA-VIT) TABS Take 1 tablet by mouth daily 30 tablet 12    omeprazole (PRILOSEC) 20 MG DR capsule Take 1 capsule (20 mg) by mouth 2 times daily (before meals) 180 capsule 3    order for DME Equipment being ordered: diabetic shoes 1 each 0    predniSONE (DELTASONE) 5 MG tablet TAKE ONE TABLET BY MOUTH ONCE DAILY IN THE MORNING AND ONE-HALF TABLET IN THE EVENING 45 tablet 12    rosuvastatin (CRESTOR) 40 MG tablet Take 20 mg by mouth Every Mon, Wed, Fri Morning      sildenafil (VIAGRA) 25 MG tablet Take 1 tablet (25 mg) by mouth as needed (as needed) 32 tablet 11    tacrolimus (GENERIC) 0.5 MG capsule Take 1 capsule (0.5 mg) by mouth every evening Total dose: 2 mg in the AM and 2.5 mg in the PM 90 capsule 3    tacrolimus (GENERIC) 1 MG capsule Take 2 capsules (2 mg) by mouth 2 times daily Total dose: 2 mg in AM and 2.5 mg in  capsule 11      Assessment:  Aubrey Duncan is a 67 year old man s/p bilateral lung transplant, 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    At present my impression is that his arterial disease is due to atherosclerosis in both his lower extremities and his coronary arteries, while his venous thrombosis disease was provoked in the context of heatstroke.  He is currently on dual  antiplatelet therapy, which was started prior to his coronary stents due to the cost of apixaban being too high for him.  I think continuing with dual antiplatelet therapy is a reasonable plan for now.  He needs this treatment for his stents, and there is not a compelling indication for indefinite anticoagulation from my perspective.    We can revisit this in about a year when he no longer needs the dual antiplatelet therapy.  If he were to resume anticoagulation, he would need an on heparin agent given his history of HIT, such as fondaparinux or warfarin, since DOAC's are prohibitively expensive.  However I continue to be unconvinced that indefinite anticoagulation is needed.  He would benefit from anticoagulation prophylaxis at times of high risk such as long travel, surgeries, etc.  I discussed this with him today, and he will reach out to me if any of these situations are to occur and we can make a plan about holding an antiplatelet agent and starting temporary anticoagulation.    Separately, I think his macrocytosis is from azathioprine, and does not need further workup.    Plan:  -Continue ASA and plavix  -Instructed to let me know if he has any high risk situations for VTE coming up (long trips, surgeries, etc.)  -Return in 12 months for follow-up    Jacob Cogan, MD  Hematology    40 minutes spent by me on the date of the encounter doing chart review, history and exam, documentation and further activities per the note        Video-Visit Details:  Type of service:  Video Visit  Video Start Time:  11:00  Video End Time (time video stopped): 11:15  Originating Location (pt. Location): Home  Distant Location (provider location):  [facility]   Mode of Communication:  Video Conference via [employer]

## 2024-01-30 ENCOUNTER — HOSPITAL ENCOUNTER (OUTPATIENT)
Dept: CARDIAC REHAB | Facility: OTHER | Age: 69
Discharge: HOME | End: 2024-01-30
Attending: INTERNAL MEDICINE
Payer: MEDICARE

## 2024-01-30 ENCOUNTER — VIRTUAL VISIT (OUTPATIENT)
Dept: HEMATOLOGY | Facility: CLINIC | Age: 69
End: 2024-01-30
Attending: STUDENT IN AN ORGANIZED HEALTH CARE EDUCATION/TRAINING PROGRAM
Payer: MEDICARE

## 2024-01-30 DIAGNOSIS — I74.3 POPLITEAL ARTERY THROMBOSIS, RIGHT (H): ICD-10-CM

## 2024-01-30 DIAGNOSIS — E88.01 ALPHA-1-ANTITRYPSIN DEFICIENCY (H): ICD-10-CM

## 2024-01-30 DIAGNOSIS — L97.819 NON-PRESSURE CHRONIC ULCER OF OTHER PART OF RIGHT LOWER LEG WITH UNSPECIFIED SEVERITY (H): Primary | ICD-10-CM

## 2024-01-30 DIAGNOSIS — D84.821 IMMUNODEFICIENCY DUE TO DRUGS (CODE) (H): ICD-10-CM

## 2024-01-30 DIAGNOSIS — I27.82 OTHER CHRONIC PULMONARY EMBOLISM WITHOUT ACUTE COR PULMONALE (H): ICD-10-CM

## 2024-01-30 DIAGNOSIS — J44.9 CHRONIC OBSTRUCTIVE PULMONARY DISEASE, UNSPECIFIED COPD TYPE (H): ICD-10-CM

## 2024-01-30 DIAGNOSIS — E11.42 TYPE 2 DIABETES MELLITUS WITH DIABETIC POLYNEUROPATHY, WITH LONG-TERM CURRENT USE OF INSULIN (H): ICD-10-CM

## 2024-01-30 DIAGNOSIS — Z79.4 TYPE 2 DIABETES MELLITUS WITH DIABETIC POLYNEUROPATHY, WITH LONG-TERM CURRENT USE OF INSULIN (H): ICD-10-CM

## 2024-01-30 DIAGNOSIS — N18.32 CHRONIC KIDNEY DISEASE, STAGE 3B (H): ICD-10-CM

## 2024-01-30 LAB
GLUCOSE BLDC GLUCOMTR-MCNC: 116 MG/DL (ref 70–99)
GLUCOSE BLDC GLUCOMTR-MCNC: 116 MG/DL (ref 70–99)
GLUCOSE BLDC GLUCOMTR-MCNC: 132 MG/DL (ref 70–99)
GLUCOSE BLDC GLUCOMTR-MCNC: 133 MG/DL (ref 70–99)
GLUCOSE BLDC GLUCOMTR-MCNC: 88 MG/DL (ref 70–99)
GLUCOSE BLDC GLUCOMTR-MCNC: 95 MG/DL (ref 70–99)

## 2024-01-30 PROCEDURE — 99215 OFFICE O/P EST HI 40 MIN: CPT | Mod: 95 | Performed by: STUDENT IN AN ORGANIZED HEALTH CARE EDUCATION/TRAINING PROGRAM

## 2024-01-30 PROCEDURE — 93798 PHYS/QHP OP CAR RHAB W/ECG: CPT

## 2024-01-30 ASSESSMENT — PATIENT HEALTH QUESTIONNAIRE - PHQ9: SUM OF ALL RESPONSES TO PHQ QUESTIONS 1-9: 0

## 2024-01-30 NOTE — PROGRESS NOTES
Patient was contacted to complete the pre-visit call prior to their telephone visit with the provider.     Allergies and medications were reviewed.     I thanked them for their time to cover this information.     Dianne Barraza MA

## 2024-02-02 ENCOUNTER — HOSPITAL ENCOUNTER (OUTPATIENT)
Dept: CARDIAC REHAB | Facility: OTHER | Age: 69
Discharge: HOME | End: 2024-02-02
Attending: NURSE PRACTITIONER
Payer: MEDICARE

## 2024-02-02 LAB — GLUCOSE BLDC GLUCOMTR-MCNC: 111 MG/DL (ref 70–99)

## 2024-02-02 PROCEDURE — 93798 PHYS/QHP OP CAR RHAB W/ECG: CPT

## 2024-02-05 ENCOUNTER — HOSPITAL ENCOUNTER (OUTPATIENT)
Dept: CARDIAC REHAB | Facility: OTHER | Age: 69
Discharge: HOME | End: 2024-02-05
Attending: NURSE PRACTITIONER
Payer: MEDICARE

## 2024-02-05 ENCOUNTER — MYC MEDICAL ADVICE (OUTPATIENT)
Dept: FAMILY MEDICINE | Facility: OTHER | Age: 69
End: 2024-02-05
Payer: MEDICARE

## 2024-02-05 LAB — GLUCOSE BLDC GLUCOMTR-MCNC: 97 MG/DL (ref 70–99)

## 2024-02-05 PROCEDURE — 93798 PHYS/QHP OP CAR RHAB W/ECG: CPT

## 2024-02-06 ENCOUNTER — OFFICE VISIT (OUTPATIENT)
Dept: DERMATOLOGY | Facility: OTHER | Age: 69
End: 2024-02-06
Attending: DERMATOLOGY
Payer: MEDICARE

## 2024-02-06 VITALS
SYSTOLIC BLOOD PRESSURE: 134 MMHG | DIASTOLIC BLOOD PRESSURE: 78 MMHG | HEART RATE: 80 BPM | OXYGEN SATURATION: 96 % | RESPIRATION RATE: 20 BRPM

## 2024-02-06 DIAGNOSIS — D48.5 NEOPLASM OF UNCERTAIN BEHAVIOR OF SKIN: Primary | ICD-10-CM

## 2024-02-06 DIAGNOSIS — L98.9 SKIN LESION: ICD-10-CM

## 2024-02-06 PROCEDURE — 88342 IMHCHEM/IMCYTCHM 1ST ANTB: CPT | Mod: 26 | Performed by: PATHOLOGY

## 2024-02-06 PROCEDURE — 88342 IMHCHEM/IMCYTCHM 1ST ANTB: CPT | Mod: TC | Performed by: DERMATOLOGY

## 2024-02-06 PROCEDURE — 88305 TISSUE EXAM BY PATHOLOGIST: CPT | Mod: 26 | Performed by: PATHOLOGY

## 2024-02-06 PROCEDURE — 11103 TANGNTL BX SKIN EA SEP/ADDL: CPT | Performed by: DERMATOLOGY

## 2024-02-06 PROCEDURE — 11102 TANGNTL BX SKIN SINGLE LES: CPT | Performed by: DERMATOLOGY

## 2024-02-06 PROCEDURE — G0463 HOSPITAL OUTPT CLINIC VISIT: HCPCS | Mod: 25

## 2024-02-06 ASSESSMENT — PAIN SCALES - GENERAL: PAINLEVEL: NO PAIN (0)

## 2024-02-06 NOTE — PROGRESS NOTES
S: Aubrey is a pleasant gentleman who underwent a lung transplant in the past year at the [facility].  He has been doing very well except that he has some cardiac issues and will be having some stents placed in the upcoming months.  Today he comes in for skin examination.    O: On the right cheek is a approximately 3 mm raised pink lesion that suggest a basal cell carcinoma on the left upper forehead there is a 2 cm.  It also suggested basal cell carcinoma..  Scattered about his scalp are some minor actinic keratoses.    A: 2 likely basal cells in a gentleman who is immunosuppressed but otherwise doing well.  P: Both lesions were anesthetized and shave biopsy samples taken and submitted to Dermpath at the [facility].  If these are basal cells as I suspect I will try to arrange for Aubrey to have Mohs surgery in [place] where he goes for his cardiac and transplant visits.    Meds and allergies reviewed

## 2024-02-06 NOTE — LETTER
2/6/2024       RE: Aubrey Duncan  [address]  [facility] [address]     Dear Colleague,    Thank you for referring your patient, Aubrey Duncan, to the [facility]. Please see a copy of my visit note below.    S: Aubrey is a pleasant gentleman who underwent a lung transplant in the past year at the [facility].  He has been doing very well except that he has some cardiac issues and will be having some stents placed in the upcoming months.  Today he comes in for skin examination.    O: On the right cheek is a approximately 3 mm raised pink lesion that suggest a basal cell carcinoma on the left upper forehead there is a 2 cm.  It also suggested basal cell carcinoma..  Scattered about his scalp are some minor actinic keratoses.    A: 2 likely basal cells in a gentleman who is immunosuppressed but otherwise doing well.  P: Both lesions were anesthetized and shave biopsy samples taken and submitted to Dermpath at the [facility].  If these are basal cells as I suspect I will try to arrange for Aubrey to have Mohs surgery in [place] where he goes for his cardiac and transplant visits.    Meds and allergies reviewed      Again, thank you for allowing me to participate in the care of your patient.      Sincerely,    SAMMI Luz MD

## 2024-02-07 ENCOUNTER — HOSPITAL ENCOUNTER (OUTPATIENT)
Dept: CARDIAC REHAB | Facility: OTHER | Age: 69
Discharge: HOME | End: 2024-02-07
Attending: NURSE PRACTITIONER
Payer: MEDICARE

## 2024-02-07 LAB — GLUCOSE BLDC GLUCOMTR-MCNC: 95 MG/DL (ref 70–99)

## 2024-02-07 PROCEDURE — 93798 PHYS/QHP OP CAR RHAB W/ECG: CPT

## 2024-02-09 ENCOUNTER — HOSPITAL ENCOUNTER (OUTPATIENT)
Dept: CARDIAC REHAB | Facility: OTHER | Age: 69
Discharge: HOME | End: 2024-02-09
Attending: NURSE PRACTITIONER
Payer: MEDICARE

## 2024-02-09 LAB
PATH REPORT.COMMENTS IMP SPEC: ABNORMAL
PATH REPORT.COMMENTS IMP SPEC: YES
PATH REPORT.FINAL DX SPEC: ABNORMAL
PATH REPORT.GROSS SPEC: ABNORMAL
PATH REPORT.MICROSCOPIC SPEC OTHER STN: ABNORMAL
PATH REPORT.RELEVANT HX SPEC: ABNORMAL

## 2024-02-09 PROCEDURE — 93798 PHYS/QHP OP CAR RHAB W/ECG: CPT

## 2024-02-12 ENCOUNTER — TELEPHONE (OUTPATIENT)
Dept: CARDIOLOGY | Facility: CLINIC | Age: 69
End: 2024-02-12
Payer: MEDICARE

## 2024-02-12 ENCOUNTER — HOSPITAL ENCOUNTER (OUTPATIENT)
Dept: CARDIAC REHAB | Facility: OTHER | Age: 69
Discharge: HOME | End: 2024-02-12
Attending: NURSE PRACTITIONER
Payer: MEDICARE

## 2024-02-12 LAB — GLUCOSE BLDC GLUCOMTR-MCNC: 106 MG/DL (ref 70–99)

## 2024-02-12 PROCEDURE — 93798 PHYS/QHP OP CAR RHAB W/ECG: CPT

## 2024-02-12 NOTE — TELEPHONE ENCOUNTER
Pre-procedure instructions - Coronary Angiogram  Patient Education    1. Your arrival time is 7:30 am on Friday Feburay 16..  Location is [address] Waiting Room  2. Please plan on being at the hospital all day.  3. At any time, emergencies and/or urgent cases may come up which could delay the start of your procedure.    Pre-procedure instructions - Coronary Angiogram  - Shower in the evening before or the morning of the procedure  - No solid food for 8 hours prior and nothing to drink 2 hours prior to arrival time  - You can take your morning medications (except for diabetic and blood thinners) with sips of water.  - Take 325 mg of Asprin (or 4 81 mg Baby aspirin)24 hours prior to the procedure and the morning of procedure.   - You will need to arrange a ride to drop you off and pick you up, as you will be unable to drive home.  Prior to discharge you may be required to lay flat for approximately 2-4 hours in the recovery unit to ensure proper clotting of the artery. You will need a responsible adult to stay with you for 24 hours post-procedure.              Diabetic Medication Instructions  ? Hold oral diabetic medication in morning of your procedure and for 48 hours after IV contrast is given  ? Typical instructions for insulin diabetic medication holding are below. However, please reach out to your Primary Care Provider or Endocrinologist for specific instructions  ? DO NOT take any oral diabetic medication, short-acting diabetes medications/insulin, humalog or regular insulin the morning of your test  ? Take   dose of long-acting insulin (Lantus, Levemir) the day of your test  o Remember to bring your glucometer and insulin with you to take after your test if needed  ? DO NOT take injectable GLP-1 agonists semaglutide (Ozempic, Wegovy), dulaglutide (Trulicity), exenatide ER (Bydureon), tirzepatide (Mounjaro), or oral  chest pain semaglutide (Rybelsus) for 7 days prior your procedure  ? Hold once daily injectable GLP-1 agonists exenatide (Byetta), liraglutide (Saxenda, Victoza), lixisenatide (Soligua) the day before and day of your procedure                  Anticoagulation Medication Instructions   NA    You will need to follow up with one of our cardiology APPs 1-2 weeks after your procedure. If you need help scheduling or rescheduling your appointment, please call [phone]     Patient states that he understands and agrees to the plan

## 2024-02-13 ENCOUNTER — MYC MEDICAL ADVICE (OUTPATIENT)
Dept: DERMATOLOGY | Facility: OTHER | Age: 69
End: 2024-02-13

## 2024-02-13 DIAGNOSIS — C44.320 SCC (SQUAMOUS CELL CARCINOMA), FACE: ICD-10-CM

## 2024-02-13 DIAGNOSIS — L98.9 SKIN LESION: Primary | ICD-10-CM

## 2024-02-13 DIAGNOSIS — C44.310 BCC (BASAL CELL CARCINOMA), FACE: ICD-10-CM

## 2024-02-14 ENCOUNTER — HOSPITAL ENCOUNTER (OUTPATIENT)
Dept: CARDIAC REHAB | Facility: OTHER | Age: 69
Discharge: HOME | End: 2024-02-14
Attending: NURSE PRACTITIONER
Payer: MEDICARE

## 2024-02-14 DIAGNOSIS — I70.219 INTERMITTENT CLAUDICATION DUE TO ATHEROSCLEROSIS OF ARTERY OF EXTREMITY (H): ICD-10-CM

## 2024-02-14 DIAGNOSIS — I73.9 PAD (PERIPHERAL ARTERY DISEASE) (H): ICD-10-CM

## 2024-02-14 PROCEDURE — 93798 PHYS/QHP OP CAR RHAB W/ECG: CPT

## 2024-02-16 ENCOUNTER — APPOINTMENT (OUTPATIENT)
Dept: MEDSURG UNIT | Facility: CLINIC | Age: 69
End: 2024-02-16
Attending: INTERNAL MEDICINE
Payer: MEDICARE

## 2024-02-16 ENCOUNTER — APPOINTMENT (OUTPATIENT)
Dept: LAB | Facility: CLINIC | Age: 69
End: 2024-02-16
Attending: INTERNAL MEDICINE
Payer: MEDICARE

## 2024-02-16 ENCOUNTER — HOSPITAL ENCOUNTER (OUTPATIENT)
Facility: CLINIC | Age: 69
Discharge: HOME | End: 2024-02-16
Attending: INTERNAL MEDICINE | Admitting: INTERNAL MEDICINE
Payer: MEDICARE

## 2024-02-16 VITALS
WEIGHT: 164.1 LBS | OXYGEN SATURATION: 93 % | SYSTOLIC BLOOD PRESSURE: 143 MMHG | HEIGHT: 68 IN | DIASTOLIC BLOOD PRESSURE: 101 MMHG | HEART RATE: 87 BPM | TEMPERATURE: 97.9 F | BODY MASS INDEX: 24.87 KG/M2 | RESPIRATION RATE: 29 BRPM

## 2024-02-16 DIAGNOSIS — I20.0 UNSTABLE ANGINA (H): ICD-10-CM

## 2024-02-16 DIAGNOSIS — I25.10 CORONARY ATHEROSCLEROSIS: ICD-10-CM

## 2024-02-16 DIAGNOSIS — I25.110 CORONARY ARTERY DISEASE INVOLVING NATIVE CORONARY ARTERY OF NATIVE HEART WITH UNSTABLE ANGINA PECTORIS (H): ICD-10-CM

## 2024-02-16 LAB
ACT BLD: 298 SECONDS (ref 74–150)
ANION GAP SERPL CALCULATED.3IONS-SCNC: 9 MMOL/L (ref 7–15)
BUN SERPL-MCNC: 36.8 MG/DL (ref 8–23)
CALCIUM SERPL-MCNC: 9.3 MG/DL (ref 8.8–10.2)
CHLORIDE SERPL-SCNC: 106 MMOL/L (ref 98–107)
CREAT SERPL-MCNC: 1.17 MG/DL (ref 0.67–1.17)
DEPRECATED HCO3 PLAS-SCNC: 26 MMOL/L (ref 22–29)
EGFRCR SERPLBLD CKD-EPI 2021: 67 ML/MIN/1.73M2
ERYTHROCYTE [DISTWIDTH] IN BLOOD BY AUTOMATED COUNT: 15.2 % (ref 10–15)
GLUCOSE BLDC GLUCOMTR-MCNC: 82 MG/DL (ref 70–99)
GLUCOSE SERPL-MCNC: 105 MG/DL (ref 70–99)
HCT VFR BLD AUTO: 36.8 % (ref 40–53)
HGB BLD-MCNC: 11.3 G/DL (ref 13.3–17.7)
INR PPP: 0.99 (ref 0.85–1.15)
MCH RBC QN AUTO: 32.9 PG (ref 26.5–33)
MCHC RBC AUTO-ENTMCNC: 30.7 G/DL (ref 31.5–36.5)
MCV RBC AUTO: 107 FL (ref 78–100)
PLATELET # BLD AUTO: 191 10E3/UL (ref 150–450)
POTASSIUM SERPL-SCNC: 5 MMOL/L (ref 3.4–5.3)
RBC # BLD AUTO: 3.43 10E6/UL (ref 4.4–5.9)
SODIUM SERPL-SCNC: 141 MMOL/L (ref 135–145)
WBC # BLD AUTO: 9.7 10E3/UL (ref 4–11)

## 2024-02-16 PROCEDURE — C1769 GUIDE WIRE: HCPCS | Performed by: INTERNAL MEDICINE

## 2024-02-16 PROCEDURE — 80048 BASIC METABOLIC PNL TOTAL CA: CPT | Performed by: CASE MANAGER/CARE COORDINATOR

## 2024-02-16 PROCEDURE — C1725 CATH, TRANSLUMIN NON-LASER: HCPCS | Performed by: INTERNAL MEDICINE

## 2024-02-16 PROCEDURE — 82962 GLUCOSE BLOOD TEST: CPT

## 2024-02-16 PROCEDURE — C1726 CATH, BAL DIL, NON-VASCULAR: HCPCS | Performed by: INTERNAL MEDICINE

## 2024-02-16 PROCEDURE — 999N000134 HC STATISTIC PP CARE STAGE 3

## 2024-02-16 PROCEDURE — 93005 ELECTROCARDIOGRAM TRACING: CPT

## 2024-02-16 PROCEDURE — 93010 ELECTROCARDIOGRAM REPORT: CPT | Mod: 76 | Performed by: INTERNAL MEDICINE

## 2024-02-16 PROCEDURE — 250N000011 HC RX IP 250 OP 636: Performed by: INTERNAL MEDICINE

## 2024-02-16 PROCEDURE — 99152 MOD SED SAME PHYS/QHP 5/>YRS: CPT | Mod: GC | Performed by: INTERNAL MEDICINE

## 2024-02-16 PROCEDURE — C1887 CATHETER, GUIDING: HCPCS | Performed by: INTERNAL MEDICINE

## 2024-02-16 PROCEDURE — 93454 CORONARY ARTERY ANGIO S&I: CPT | Performed by: INTERNAL MEDICINE

## 2024-02-16 PROCEDURE — 272N000001 HC OR GENERAL SUPPLY STERILE: Performed by: INTERNAL MEDICINE

## 2024-02-16 PROCEDURE — C1874 STENT, COATED/COV W/DEL SYS: HCPCS | Performed by: INTERNAL MEDICINE

## 2024-02-16 PROCEDURE — C1894 INTRO/SHEATH, NON-LASER: HCPCS | Performed by: INTERNAL MEDICINE

## 2024-02-16 PROCEDURE — 92928 PRQ TCAT PLMT NTRAC ST 1 LES: CPT | Mod: RC | Performed by: INTERNAL MEDICINE

## 2024-02-16 PROCEDURE — 258N000003 HC RX IP 258 OP 636: Performed by: CASE MANAGER/CARE COORDINATOR

## 2024-02-16 PROCEDURE — 36415 COLL VENOUS BLD VENIPUNCTURE: CPT | Performed by: CASE MANAGER/CARE COORDINATOR

## 2024-02-16 PROCEDURE — 85347 COAGULATION TIME ACTIVATED: CPT

## 2024-02-16 PROCEDURE — 85048 AUTOMATED LEUKOCYTE COUNT: CPT | Performed by: CASE MANAGER/CARE COORDINATOR

## 2024-02-16 PROCEDURE — 99153 MOD SED SAME PHYS/QHP EA: CPT | Performed by: INTERNAL MEDICINE

## 2024-02-16 PROCEDURE — 999N000142 HC STATISTIC PROCEDURE PREP ONLY

## 2024-02-16 PROCEDURE — 258N000003 HC RX IP 258 OP 636: Performed by: INTERNAL MEDICINE

## 2024-02-16 PROCEDURE — 999N000054 HC STATISTIC EKG NON-CHARGEABLE

## 2024-02-16 PROCEDURE — C9600 PERC DRUG-EL COR STENT SING: HCPCS | Performed by: INTERNAL MEDICINE

## 2024-02-16 PROCEDURE — 85610 PROTHROMBIN TIME: CPT | Performed by: CASE MANAGER/CARE COORDINATOR

## 2024-02-16 PROCEDURE — 250N000013 HC RX MED GY IP 250 OP 250 PS 637: Performed by: INTERNAL MEDICINE

## 2024-02-16 PROCEDURE — 99152 MOD SED SAME PHYS/QHP 5/>YRS: CPT | Performed by: INTERNAL MEDICINE

## 2024-02-16 PROCEDURE — 250N000009 HC RX 250: Performed by: INTERNAL MEDICINE

## 2024-02-16 DEVICE — STENT CORONARY DES SYNERGY XD MR US 3.50X20MM H7493941820350: Type: IMPLANTABLE DEVICE | Status: FUNCTIONAL

## 2024-02-16 RX ORDER — NITROGLYCERIN 0.4 MG/1
0.4 TABLET SUBLINGUAL EVERY 5 MIN PRN
Status: DISCONTINUED | OUTPATIENT
Start: 2024-02-16 | End: 2024-02-16 | Stop reason: HOSPADM

## 2024-02-16 RX ORDER — ACETAMINOPHEN 325 MG/1
650 TABLET ORAL EVERY 4 HOURS PRN
Status: DISCONTINUED | OUTPATIENT
Start: 2024-02-16 | End: 2024-02-16 | Stop reason: HOSPADM

## 2024-02-16 RX ORDER — SODIUM CHLORIDE 9 MG/ML
INJECTION, SOLUTION INTRAVENOUS CONTINUOUS
Status: SHIPPED | OUTPATIENT
Start: 2024-02-16 | End: 2024-02-16

## 2024-02-16 RX ORDER — FLUMAZENIL 0.1 MG/ML
0.2 INJECTION, SOLUTION INTRAVENOUS
Status: DISCONTINUED | OUTPATIENT
Start: 2024-02-16 | End: 2024-02-16 | Stop reason: HOSPADM

## 2024-02-16 RX ORDER — ONDANSETRON 2 MG/ML
4 INJECTION INTRAMUSCULAR; INTRAVENOUS EVERY 6 HOURS PRN
Status: DISCONTINUED | OUTPATIENT
Start: 2024-02-16 | End: 2024-02-16 | Stop reason: HOSPADM

## 2024-02-16 RX ORDER — CLOPIDOGREL BISULFATE 75 MG/1
TABLET ORAL
Status: DISCONTINUED | OUTPATIENT
Start: 2024-02-16 | End: 2024-02-16 | Stop reason: HOSPADM

## 2024-02-16 RX ORDER — SODIUM CHLORIDE 9 MG/ML
INJECTION, SOLUTION INTRAVENOUS CONTINUOUS
Status: DISCONTINUED | OUTPATIENT
Start: 2024-02-16 | End: 2024-02-16 | Stop reason: HOSPADM

## 2024-02-16 RX ORDER — NALOXONE HYDROCHLORIDE 0.4 MG/ML
0.4 INJECTION, SOLUTION INTRAMUSCULAR; INTRAVENOUS; SUBCUTANEOUS
Status: DISCONTINUED | OUTPATIENT
Start: 2024-02-16 | End: 2024-02-16 | Stop reason: HOSPADM

## 2024-02-16 RX ORDER — POTASSIUM CHLORIDE 750 MG/1
20 TABLET, EXTENDED RELEASE ORAL
Status: DISCONTINUED | OUTPATIENT
Start: 2024-02-16 | End: 2024-02-16 | Stop reason: HOSPADM

## 2024-02-16 RX ORDER — NALOXONE HYDROCHLORIDE 0.4 MG/ML
0.2 INJECTION, SOLUTION INTRAMUSCULAR; INTRAVENOUS; SUBCUTANEOUS
Status: DISCONTINUED | OUTPATIENT
Start: 2024-02-16 | End: 2024-02-16 | Stop reason: HOSPADM

## 2024-02-16 RX ORDER — IOPAMIDOL 755 MG/ML
INJECTION, SOLUTION INTRAVASCULAR
Status: DISCONTINUED | OUTPATIENT
Start: 2024-02-16 | End: 2024-02-16 | Stop reason: HOSPADM

## 2024-02-16 RX ORDER — ASPIRIN 81 MG/1
243 TABLET, CHEWABLE ORAL ONCE
Status: COMPLETED | OUTPATIENT
Start: 2024-02-16 | End: 2024-02-16

## 2024-02-16 RX ORDER — ASPIRIN 325 MG
325 TABLET ORAL ONCE
Status: COMPLETED | OUTPATIENT
Start: 2024-02-16 | End: 2024-02-16

## 2024-02-16 RX ORDER — POTASSIUM CHLORIDE 750 MG/1
40 TABLET, EXTENDED RELEASE ORAL
Status: DISCONTINUED | OUTPATIENT
Start: 2024-02-16 | End: 2024-02-16 | Stop reason: HOSPADM

## 2024-02-16 RX ORDER — FENTANYL CITRATE 50 UG/ML
25 INJECTION, SOLUTION INTRAMUSCULAR; INTRAVENOUS
Status: DISCONTINUED | OUTPATIENT
Start: 2024-02-16 | End: 2024-02-16 | Stop reason: HOSPADM

## 2024-02-16 RX ORDER — ATROPINE SULFATE 0.1 MG/ML
0.5 INJECTION INTRAVENOUS
Status: DISCONTINUED | OUTPATIENT
Start: 2024-02-16 | End: 2024-02-16 | Stop reason: HOSPADM

## 2024-02-16 RX ORDER — NITROGLYCERIN 5 MG/ML
VIAL (ML) INTRAVENOUS
Status: DISCONTINUED | OUTPATIENT
Start: 2024-02-16 | End: 2024-02-16 | Stop reason: HOSPADM

## 2024-02-16 RX ORDER — LIDOCAINE 40 MG/G
CREAM TOPICAL
Status: DISCONTINUED | OUTPATIENT
Start: 2024-02-16 | End: 2024-02-16 | Stop reason: HOSPADM

## 2024-02-16 RX ORDER — FENTANYL CITRATE 50 UG/ML
INJECTION, SOLUTION INTRAMUSCULAR; INTRAVENOUS
Status: DISCONTINUED | OUTPATIENT
Start: 2024-02-16 | End: 2024-02-16 | Stop reason: HOSPADM

## 2024-02-16 RX ORDER — METOPROLOL TARTRATE 1 MG/ML
5 INJECTION, SOLUTION INTRAVENOUS
Status: DISCONTINUED | OUTPATIENT
Start: 2024-02-16 | End: 2024-02-16 | Stop reason: HOSPADM

## 2024-02-16 RX ORDER — ASPIRIN 81 MG/1
81 TABLET ORAL DAILY
Status: DISCONTINUED | OUTPATIENT
Start: 2024-02-17 | End: 2024-02-16 | Stop reason: HOSPADM

## 2024-02-16 RX ORDER — HYDRALAZINE HYDROCHLORIDE 20 MG/ML
10 INJECTION INTRAMUSCULAR; INTRAVENOUS EVERY 4 HOURS PRN
Status: DISCONTINUED | OUTPATIENT
Start: 2024-02-16 | End: 2024-02-16 | Stop reason: HOSPADM

## 2024-02-16 RX ORDER — ONDANSETRON 4 MG/1
4 TABLET, ORALLY DISINTEGRATING ORAL EVERY 6 HOURS PRN
Status: DISCONTINUED | OUTPATIENT
Start: 2024-02-16 | End: 2024-02-16 | Stop reason: HOSPADM

## 2024-02-16 RX ADMIN — SODIUM CHLORIDE: 9 INJECTION, SOLUTION INTRAVENOUS at 08:47

## 2024-02-16 RX ADMIN — BIVALIRUDIN 1.75 MG/KG/HR: 250 INJECTION INTRACAVERNOUS at 11:58

## 2024-02-16 ASSESSMENT — ACTIVITIES OF DAILY LIVING (ADL)
ADLS_ACUITY_SCORE: 35

## 2024-02-16 NOTE — PRE-PROCEDURE
GENERAL PRE-PROCEDURE:     Verbal consent obtained?: Yes    Written consent obtained?: Yes    Risks and benefits: Risks, benefits and alternatives were discussed    Consent given by:  Patient  Patient states understanding of procedure being performed: Yes    Patient's understanding of procedure matches consent: Yes    Procedure consent matches procedure scheduled: Yes    Expected level of sedation:  Moderate  Appropriately NPO:  Yes  ASA Class:  3  Mallampati  :  Grade 3- soft palate visible, posterior pharyngeal wall not visible  Lungs:  Lungs clear with good breath sounds bilaterally  Heart:  Normal heart sounds and rate  History & Physical reviewed:  History and physical reviewed and no updates needed  Statement of review:  I have reviewed the lab findings, diagnostic data, medications, and the plan for sedation

## 2024-02-16 NOTE — PROGRESS NOTES
D/I/A: Manual pressure held for 20 minutes to L groin.  Sheath, size 6 Fr pulled by TH from [facility].  Site CDI, no hematoma.  Stasis achieved at 1532. Off bedrest @ 1932.  A+O x4 and making needs known.  Denies pain or sob.  VSSA.  Tolerated sheath removal well.  P: Continue to monitor status.  Discharge to home once meeting criteria.

## 2024-02-16 NOTE — Clinical Note
The first balloon was inserted into the right coronary artery and middle right coronary artery.Max pressure = 12 jen. Total duration = 10 seconds.     Max pressure = 12 jen. Total duration = 10 seconds.    Balloon reinflated a second time: Max pressure = 12 jen. Total duration = 10 seconds.  Balloon reinflated a third time: Max pressure = 12 jen. Total duration = 10 seconds.

## 2024-02-16 NOTE — Clinical Note
Stent deployed in the middle right coronary artery. Max pressure = 12 jen. Total duration = 10 seconds.

## 2024-02-16 NOTE — PROGRESS NOTES
D/I/A: Pt roomed on 2A in Room 11.  Arrived via litter and accompanied by CCL RN Kelsie DAVIS.  Rhythm upon arrival: Sinus rhythm on monitor.  Denies pain or sob.  Reviewed activity restrictions and when to notify RN, ie-changes to breathing or increased chest pressure or chest pain.  CCL access: Sheath on LFA. Site WNL. No bleeding or hematoma. Pulses present by doppler. Bivalrudin stopped @ 1304. Sheath to be removed @ 1500 per Dr. Call.    P: Continue to monitor status.  Discharge to home once meeting criteria.

## 2024-02-16 NOTE — DISCHARGE INSTRUCTIONS
Going Home after an Angioplasty or Stent Placement (Cardiac)  ______________________________________________      After you go home:  Have an adult stay with you for 24 hours.  Drink plenty of fluids.  You may eat your normal diet, unless your doctor tells you otherwise.  For 24 hours:  Relax and take it easy.  Do NOT smoke.  Do NOT make any important or legal decisions.  Do NOT drive or operate machines at home or at work.  Do NOT drink alcohol.  Remove the Band-Aid after 24 hours. If there is minor oozing, apply another Band-aid and remove it after 12 hours.  For 2 days, do NOT have sex or do any heavy exercise.  Do NOT take a bath, or use a hot tub or pool for at least 3 days. You may shower.    Care of groin site  It is normal to have a small bruise or lump at the site.  Do not scrub the site.  For the first 2 days: Do not stoop or squat. When you cough, sneeze or move your bowels, hold your hand over the puncture site and press gently.  Do not lift more than 10 pounds for at least 3 to 5 days.  Do not use lotion or powder near the puncture site for 3 days.    If you start bleeding from the site in your groin, lie down flat and press firmly  on the site. Call your doctor as soon as you can.      Medicines  If you have started taking Plavix or Effient, do not stop taking it until you talk to your heart doctor (cardiologist).  If you are on metformin (Glucophage), do not restart it until you have blood tests (within 2 to 3 days after discharge). When your doctor tells you it is safe, you may restart the metformin.  If you have stopped any other medicines, check with your nurse or provider about when to restart them.    Call 911 right away if you have bleeding that is heavy or does not stop.    Call your doctor if:  You have a large or growing hard lump around the site.  The site is red, swollen, hot or tender.  Blood or fluid is draining from the site.  You have chills or a fever greater than 101 F (38 C).  Your  leg or arm feels numb or cool.  You have hives, a rash or unusual itching.      [facility] [place]:  [phone] (7 days a week)

## 2024-02-16 NOTE — PROGRESS NOTES
Pt prepped for CORS. Pt has allergy to Heparin. Pt alert and oriented. VSS. Sats >92% on RA. Pt denies any pain. Radial pulses present. Bilateral groin prepped, pulses marked and present by Doppler. Pt took Aspirin 325 mg this morning. Pt gave self 9 units of Lantus this morning. NaCl infusing @ 150 ml/hr. EKG completed: SR w/PACs. Labs resulted. Consent needed. Pt's spouse Kyung at bedside and will transport pt home post procedure.

## 2024-02-16 NOTE — Clinical Note
Balloon removed intact. Topical Clindamycin Pregnancy And Lactation Text: This medication is Pregnancy Category B and is considered safe during pregnancy. It is unknown if it is excreted in breast milk.

## 2024-02-17 NOTE — PROGRESS NOTES
Pt ambulated post 4 hour bedrest. L groin site CDI with no hematoma. PIV intact upon removal. Pt and wife demonstrated understanding of discharge teaching. Wife is ride home.

## 2024-02-19 LAB
ATRIAL RATE - MUSE: 68 BPM
ATRIAL RATE - MUSE: 73 BPM
DIASTOLIC BLOOD PRESSURE - MUSE: NORMAL MMHG
DIASTOLIC BLOOD PRESSURE - MUSE: NORMAL MMHG
INTERPRETATION ECG - MUSE: NORMAL
INTERPRETATION ECG - MUSE: NORMAL
P AXIS - MUSE: 22 DEGREES
P AXIS - MUSE: 23 DEGREES
PR INTERVAL - MUSE: 166 MS
PR INTERVAL - MUSE: 170 MS
QRS DURATION - MUSE: 68 MS
QRS DURATION - MUSE: 90 MS
QT - MUSE: 382 MS
QT - MUSE: 430 MS
QTC - MUSE: 420 MS
QTC - MUSE: 457 MS
R AXIS - MUSE: -21 DEGREES
R AXIS - MUSE: -23 DEGREES
SYSTOLIC BLOOD PRESSURE - MUSE: NORMAL MMHG
SYSTOLIC BLOOD PRESSURE - MUSE: NORMAL MMHG
T AXIS - MUSE: 102 DEGREES
T AXIS - MUSE: 114 DEGREES
VENTRICULAR RATE- MUSE: 68 BPM
VENTRICULAR RATE- MUSE: 73 BPM

## 2024-02-19 RX ORDER — CLOPIDOGREL BISULFATE 75 MG/1
75 TABLET ORAL DAILY
Qty: 90 TABLET | Refills: 3 | Status: ON HOLD | OUTPATIENT
Start: 2024-02-19 | End: 2024-10-01

## 2024-02-19 NOTE — TELEPHONE ENCOUNTER
S-(situation):  called patient post coronary angiogram with PCI to RCA done Friday, February 16.       RPDA lesion is 40% stenosed.    Mid RCA lesion is 80% stenosed.     1.  Successful planned PCI to the mid RCA including deployment of a 3.5 x 20 mm Synergy XD MEGHNA that was postdilated with a 4.0 mm NC balloon to 12 jen.    His left femoral artery was used for access. He states it is flat and dry, no bruisitn. Reviewed activity restrictions. He is already attending cardiac rehab and will continue to do so.  No new meds at discharge, he was already on  DAPT.  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  He presented in January with an NSTE-ACS and underwent PCI to the culprit LAD and not presented for planned staged PCI to the RCA       A-(assessment): coronary artery     R-(recommendations): follow up with cardiology NP

## 2024-02-20 ENCOUNTER — TRANSCRIBE ORDERS (OUTPATIENT)
Dept: OTHER | Age: 69
End: 2024-02-20

## 2024-02-20 ENCOUNTER — TELEPHONE (OUTPATIENT)
Dept: DERMATOLOGY | Facility: CLINIC | Age: 69
End: 2024-02-20

## 2024-02-20 NOTE — TELEPHONE ENCOUNTER
Only a month later than our next openings so should be ok. Low risk NMSC (SCCIS and nBCC) on cheek and temple     Roberta Cao MD

## 2024-02-20 NOTE — TELEPHONE ENCOUNTER
Called patient to schedule surgery with Dr. Wilson    Date of Surgery: 05/15    Surgery type: Mohs    Consult scheduled: Yes    Has patient had mohs with us before? No    Additional comments: pt requested 05/15 because he already has to come down to the [facility] for several appts on 05/16.      Routing to Dr. Wilson and Dr. Cao as a FYI because this is over 6 weeks from now.     Zoe Rubio on 2/20/2024 at 11:38 AM

## 2024-02-23 ENCOUNTER — HOSPITAL ENCOUNTER (OUTPATIENT)
Dept: CARDIAC REHAB | Facility: OTHER | Age: 69
Discharge: HOME | End: 2024-02-23
Attending: NURSE PRACTITIONER
Payer: MEDICARE

## 2024-02-23 PROCEDURE — 93798 PHYS/QHP OP CAR RHAB W/ECG: CPT

## 2024-02-26 ENCOUNTER — HOSPITAL ENCOUNTER (OUTPATIENT)
Dept: CARDIAC REHAB | Facility: OTHER | Age: 69
Discharge: HOME | End: 2024-02-26
Attending: NURSE PRACTITIONER
Payer: MEDICARE

## 2024-02-26 PROCEDURE — 93798 PHYS/QHP OP CAR RHAB W/ECG: CPT

## 2024-02-28 ENCOUNTER — HOSPITAL ENCOUNTER (OUTPATIENT)
Dept: CARDIAC REHAB | Facility: OTHER | Age: 69
Discharge: HOME | End: 2024-02-28
Attending: NURSE PRACTITIONER
Payer: MEDICARE

## 2024-02-28 ENCOUNTER — VIRTUAL VISIT (OUTPATIENT)
Dept: CARDIOLOGY | Facility: CLINIC | Age: 69
End: 2024-02-28
Attending: CASE MANAGER/CARE COORDINATOR
Payer: MEDICARE

## 2024-02-28 VITALS
SYSTOLIC BLOOD PRESSURE: 136 MMHG | DIASTOLIC BLOOD PRESSURE: 67 MMHG | BODY MASS INDEX: 24.25 KG/M2 | HEART RATE: 85 BPM | HEIGHT: 68 IN | WEIGHT: 160 LBS

## 2024-02-28 DIAGNOSIS — I25.110 CORONARY ARTERY DISEASE INVOLVING NATIVE CORONARY ARTERY OF NATIVE HEART WITH UNSTABLE ANGINA PECTORIS (H): ICD-10-CM

## 2024-02-28 DIAGNOSIS — N18.30 STAGE 3 CHRONIC KIDNEY DISEASE, UNSPECIFIED WHETHER STAGE 3A OR 3B CKD (H): ICD-10-CM

## 2024-02-28 DIAGNOSIS — E78.5 HYPERLIPIDEMIA LDL GOAL <70: Primary | ICD-10-CM

## 2024-02-28 DIAGNOSIS — I10 PRIMARY HYPERTENSION: ICD-10-CM

## 2024-02-28 PROCEDURE — 99214 OFFICE O/P EST MOD 30 MIN: CPT | Mod: 95 | Performed by: CASE MANAGER/CARE COORDINATOR

## 2024-02-28 PROCEDURE — 93798 PHYS/QHP OP CAR RHAB W/ECG: CPT

## 2024-02-28 ASSESSMENT — PAIN SCALES - GENERAL: PAINLEVEL: NO PAIN (1)

## 2024-02-28 NOTE — LETTER
2/28/2024      RE: Aubrey Duncan  [address]  [place] [address]       Dear Colleague,    Thank you for the opportunity to participate in the care of your patient, Aubrey Duncan, at the [facility] [place] at [facility]. Please see a copy of my visit note below.          [facility] Cardiology - [facility]   Cardiology Clinic Note      HPI:   Mr. 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 He is seen today via video visit for follow up.    He presented to [facility] on 1/11, reported with left-sided/substernal chest pain with exertion that had been occurring for 1 week, decreased at rest. Troponin 22->23, EKG without ischemic changes. He underwent coronary angiogram 1/11/23 showed severe multivessel disease with 80% stenosis in prox-mid LAD and heavily calcified stenosis in mid-RCA. He underwent PCI x1 to LAD, with plan to return for staged PCI.     Today he denies chest pain, palpitations, dizziness, syncope, or lower extremity edema. He notes moderate bruising from DAPT.    Interval History 2/28/24:  Aubrey underwent staged PCI on 2/16 now s/p MEGHNA x1 to RCA. He is feeling well, slight shortness of breath that feels related to his lung disease, he will talk with his pulmonologist about it at upcoming appt.  Mild bruising, no issues with bleeding on DAPT. Has been attending cardiac rehab, BP well controlled 120/70s at most recent visit.      PAST MEDICAL HISTORY:  Past Medical History:   Diagnosis Date    Acute postoperative pain 09/11/2013    Alpha-1-antitrypsin deficiency (H)     Arthritis     Basal cell carcinoma     CMV (cytomegalovirus infection) (H)      Reacttivation 2013 when valcyte held    DVT of upper extremity (deep vein thrombosis) (H) 2013    Nonocclusive thrombosis extending from the right subclavian vein to the right axillary vein,  Segmental occlusion of right basilic vein in the upper arm. 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 Mother     Cerebrovascular Disease Father          at age 83 with ministrokes; had arthritis as a farmer    Asthma Sister     Diabetes Sister     Hypertension Sister     Other - See Comments Sister         bleeding disorder    Hypertension Daughter     Other - See Comments Daughter         fibromyalgia    Skin Cancer No family hx of     Melanoma No family hx of     Glaucoma No family hx of     Macular Degeneration No family hx of        SOCIAL HISTORY:  Social History     Socioeconomic History    Marital status:      Spouse name: Kyung    Number of children: 1   Occupational History    Occupation: Sanitation business owner; construction     Employer: [employer]   Tobacco Use    Smoking status: Former     Packs/day: 2.00     Years: 15.00     Additional pack years: 0.00     Total pack years: 30.00     Types: Cigarettes     Quit date: 1986     Years since quittin.0     Passive exposure: Past     Smokeless tobacco: Never   Vaping Use    Vaping Use: Never used   Substance and Sexual Activity    Alcohol use: No     Alcohol/week: 0.0 standard drinks of alcohol    Drug use: No    Sexual activity: Yes     Partners: Female       CURRENT MEDICATIONS:  acetaminophen (TYLENOL) 325 MG tablet, Take 2 tablets (650 mg) by mouth every 6 hours as needed for mild pain  albuterol (PROAIR HFA/PROVENTIL HFA/VENTOLIN HFA) 108 (90 Base) MCG/ACT inhaler, Inhale 1-2 puffs into the lungs every 6 hours as needed for shortness of breath or wheezing  amLODIPine (NORVASC) 10 MG tablet, Take 1 tablet (10 mg) by mouth daily  aspirin (ASA) 81 MG EC tablet, Take 1 tablet (81 mg) by mouth daily  azaTHIOprine (IMURAN) 50 MG tablet, Take 1 tablet (50 mg) by mouth daily  azithromycin (ZITHROMAX) 250 MG tablet, Take 250 mg by mouth Every Mon, Wed, Fri Morning  blood glucose (NO BRAND SPECIFIED) test strip, Use to test blood sugar 3 times daily. Dispense as covered by insurance. Dx. Code: E11.9. Preferred brand: Contour Next.  Calcium Carbonate-Vitamin D 600-10 MG-MCG TABS, Take 1 tablet by mouth 2 times daily (with meals)  carvedilol (COREG) 6.25 MG tablet, TAKE 1 TABLET (6.25 MG) BY MOUTH 2 TIMES DAILY (WITH MEALS)  clopidogrel (PLAVIX) 75 MG tablet, Take 1 tablet (75 mg) by mouth daily  dapsone (ACZONE) 25 MG tablet, Take 2 tablets (50 mg) by mouth daily  econazole nitrate 1 % external cream, APPLY TOPICALLY DAILY TO FEET AND HEELS.  fludrocortisone (FLORINEF) 0.1 MG tablet, Take 1 tablet (0.1 mg) by mouth daily  fluticasone-salmeterol (ADVAIR-HFA) 230-21 MCG/ACT inhaler, Inhale 2 puffs into the lungs 2 times daily  furosemide (LASIX) 20 MG tablet, Take 1 tablet (20 mg) by mouth daily  insulin aspart (NOVOLOG PEN) 100 UNIT/ML pen, Take 5 U am, 3 unit(s) non, 5 unit(s) pm of insulin within 30 minutes of start of breakfast, lunch, and dinner. Do not give if blood sugar is less than 70 mg/dl.  insulin glargine (LANTUS PEN) 100 UNIT/ML pen,  Inject 18 Units Subcutaneous every morning (before breakfast)  insulin pen needle (32G X 4 MM) 32G X 4 MM miscellaneous, Use 4 pen needles daily or as directed. Dispense as insurance allows. Dx. Code: E09.9  Lancet Devices (MICROLET NEXT LANCING DEVICE) MISC, USE AS DIRECTED  lisinopril (ZESTRIL) 40 MG tablet, Take 40 mg by mouth daily  loperamide (IMODIUM) 2 MG capsule, Take 1 capsule (2 mg) by mouth 4 times daily as needed for diarrhea  magnesium oxide (MAG-OX) 400 MG tablet, Take 1 tablet (400 mg) by mouth 2 times daily  Microlet Lancets MISC, CHECK BLOOD SUGAR FOUR TIMES DAILY E11.9  montelukast (SINGULAIR) 10 MG tablet, Take 1 tablet (10 mg) by mouth every evening  multivitamin, therapeutic (THERA-VIT) TABS, Take 1 tablet by mouth daily  omeprazole (PRILOSEC) 20 MG DR capsule, Take 1 capsule (20 mg) by mouth 2 times daily (before meals)  order for DME, Equipment being ordered: diabetic shoes  predniSONE (DELTASONE) 5 MG tablet, TAKE ONE TABLET BY MOUTH ONCE DAILY IN THE MORNING AND ONE-HALF TABLET IN THE EVENING  rosuvastatin (CRESTOR) 40 MG tablet, Take 20 mg by mouth Every Mon, Wed, Fri Morning  sildenafil (VIAGRA) 25 MG tablet, Take 1 tablet (25 mg) by mouth as needed (as needed)  tacrolimus (GENERIC) 0.5 MG capsule, Take 1 capsule (0.5 mg) by mouth every evening Total dose: 2 mg in the AM and 2.5 mg in the PM  tacrolimus (GENERIC) 1 MG capsule, Take 2 capsules (2 mg) by mouth 2 times daily Total dose: 2 mg in AM and 2.5 mg in PM    No current facility-administered medications on file prior to visit.      ROS:   Refer to HPI    EXAM:  There were no vitals taken for this visit.  GENERAL: alert and no distress  EYES: Eyes grossly normal to inspection.  No discharge or erythema, or obvious scleral/conjunctival abnormalities.  RESP: No audible wheeze, cough, or visible cyanosis.    SKIN: Visible skin clear. No significant rash, abnormal pigmentation or lesions.  NEURO: Cranial nerves grossly intact.  Mentation  and speech appropriate for age.  PSYCH: Appropriate affect, tone, and pace of words      Labs, reviewed with patient in clinic today:  CBC RESULTS:  Lab Results   Component Value Date    WBC 9.7 02/16/2024    WBC 6.2 06/03/2021    RBC 3.43 (L) 02/16/2024    RBC 3.83 (L) 06/03/2021    HGB 11.3 (L) 02/16/2024    HGB 13.0 (L) 06/03/2021    HCT 36.8 (L) 02/16/2024    HCT 40.1 06/03/2021     (H) 02/16/2024     (H) 06/03/2021    MCH 32.9 02/16/2024    MCH 33.9 (H) 06/03/2021    MCHC 30.7 (L) 02/16/2024    MCHC 32.4 06/03/2021    RDW 15.2 (H) 02/16/2024    RDW 13.1 06/03/2021     02/16/2024     06/03/2021       CMP RESULTS:  Lab Results   Component Value Date     02/16/2024     06/03/2021    POTASSIUM 5.0 02/16/2024    POTASSIUM 4.5 10/26/2022    POTASSIUM 4.6 06/03/2021    CHLORIDE 106 02/16/2024    CHLORIDE 110 (H) 11/09/2022    CHLORIDE 105 06/03/2021    CO2 26 02/16/2024    CO2 28 10/26/2022    CO2 26 06/03/2021    ANIONGAP 9 02/16/2024    ANIONGAP 6 10/26/2022    ANIONGAP 9 06/03/2021    GLC 82 02/16/2024     (H) 02/16/2024    GLC 92 10/26/2022    GLC 96 06/03/2021    BUN 36.8 (H) 02/16/2024    BUN 36 (H) 10/26/2022    BUN 38 (H) 06/03/2021    CR 1.17 02/16/2024    CR 1.29 06/03/2021    GFRESTIMATED 67 02/16/2024    GFRESTIMATED 59 (L) 07/21/2022    GFRESTIMATED 56 (L) 06/03/2021    GFRESTBLACK 67 06/03/2021    ERIN 9.3 02/16/2024    ERIN 8.9 06/03/2021    BILITOTAL 0.7 01/12/2024    BILITOTAL 0.3 05/12/2021    ALBUMIN 3.6 01/12/2024    ALBUMIN 3.9 10/12/2022    ALBUMIN 3.8 05/12/2021    ALKPHOS 57 01/12/2024    ALKPHOS 38 05/12/2021    ALT 46 01/12/2024    ALT 55 (H) 05/12/2021    AST 37 01/12/2024    AST 43 (H) 05/12/2021        INR RESULTS:  Lab Results   Component Value Date    INR 0.99 02/16/2024    INR 1.00 10/30/2020       Lab Results   Component Value Date    MAG 1.9 01/12/2024    MAG 1.8 (L) 06/03/2021     Lab Results   Component Value Date    NTBNPI 212 (H)  "2019     No results found for: \"NTBNP\"    LIPIDS:  Recent Labs   Lab Test 24  0425 22  0930   CHOL 134 122   HDL 38* 44   LDL 60 51   TRIG 179* 135         Echocardiogram:  Recent Results (from the past 4320 hour(s))   Echocardiogram Complete   Result Value    LVEF  55-60%    [facility]    [id number]  [id number]  [id number]  [id number]^JOSLYN^VINNY     [facility]  Echocardiography Laboratory  [address] [place] [id number]     Name: YARED HAMLIN  MRN: [id number]  : 1955  Study Date: 2024 09:15 AM  Age: 68 yrs  Gender: Male  Patient Location: [facility]  Reason For Study: Unstable Angina  Ordering Physician: VINNY JORGENSEN  Referring Physician: CARLOS MC  Performed By: Abe Marino RDCS     BSA: 1.9 m2  Height: 68 in  Weight: 162 lb  HR: 73  BP: 137/84 mmHg  ______________________________________________________________________________  Procedure  Complete Portable Echo Adult. Contrast Optison. Optison (NDC #6196-2898-17)  given intravenously. Patient was given 6 ml mixture of 3 ml Optison and 6 ml  saline. 3 ml wasted.  ______________________________________________________________________________  Interpretation Summary  Global and regional left ventricular function is normal with an EF of 55-60%.  The right ventricle is normal size. Global right ventricular function is  normal.  No significant valvular disease.  No pericardial effusion.  Normal IVC.     This study was compared with the study from 2012. There has been no  change.  ______________________________________________________________________________  Left Ventricle  Left ventricular size is normal. Global and regional left ventricular function  is normal with an EF of 55-60%. Mild concentric wall thickening consistent  with left ventricular hypertrophy is present. Left ventricular diastolic  function is indeterminate. Diastolic Doppler findings (E/E' ratio and/or " other  parameters) suggest left ventricular filling pressures are normal. No regional  wall motion abnormalities are seen.     Right Ventricle  Global right ventricular function is normal. The right ventricle is normal  size.     Atria  The right atria appears normal. Moderate left atrial enlargement is present.     Mitral Valve  The mitral valve is normal.     Aortic Valve  Aortic valve is normal in structure and function. The aortic valve is  tricuspid.     Tricuspid Valve  The tricuspid valve is normal. The peak velocity of the tricuspid regurgitant  jet is not obtainable. Pulmonary artery systolic pressure cannot be assessed.     Pulmonic Valve  The pulmonic valve is normal.     Vessels  The inferior vena cava was normal in size with preserved respiratory  variability. The aorta root is normal. The thoracic aorta is normal. Sinuses  of Valsalva 3.5 cm. Ascending aorta 3.5 cm.     Pericardium  Prominent epicardial fat is noted. No pericardial effusion is present.     Compared to Previous Study  This study was compared with the study from 2012 . There has been no  change.     Attestation  I have personally viewed the imaging and agree with the interpretation and  report as documented by the fellow, Jorge Luis Reyes Castro, and/or edited by  me.  ______________________________________________________________________________  MMode/2D Measurements & Calculations  IVSd: 1.2 cm  LVIDd: 5.0 cm  LVIDs: 3.7 cm  LVPWd: 1.2 cm  FS: 27.1 %  LV mass(C)d: 237.5 grams  LV mass(C)dI: 127.1 grams/m2  asc Aorta Diam: 3.5 cm  LVOT diam: 2.8 cm  LVOT area: 6.0 cm2  Asc Ao diam index BSA (cm/m2): 1.9  Asc Ao diam index Ht(cm/m): 2.0     LA Volume Index (BP): 43.9 ml/m2  RWT: 0.47  TAPSE: 1.4 cm     Doppler Measurements & Calculations  MV E max chriss: 38.2 cm/sec  MV A max chriss: 67.8 cm/sec  MV E/A: 0.56  MV dec slope: 223.9 cm/sec2  MV dec time: 0.17 sec  PA acc time: 0.10 sec  E/E' av.5  Lateral E/e': 10.1  Medial E/e': 5.0      ______________________________________________________________________________  Report approved by: Rach Pastor 01/11/2024 11:04 AM             Coronary Angiogram 1/11/2024:  Physicians    Panel Physicians Referring Physician Case Authorizing Physician   Osiel Ott MD (Primary) Silke Ward PARachelC Yoko Ovalles APRN CNP Basrawala, Hussain Z, MD (Fellow - Assisting)     Edwin Sam MD (Fellow - Assisting)       Procedures    Panel 1    Primary Surgeon: Osiel Ott MD   Procedure: Coronary Angiogram    Percutaneous Coronary Intervention        Indications    Unstable angina (H) [I20.0 (ICD-10-CM)]     Comments/Patient Narrative    The patient is a 67 yo M with PMHx of A1AT deficiency s/p bilateral lung transplant (9/8/2013) c/b 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  He presents to the cath lab today for a coronary angiogram +/- PCI in the setting of possible NSTEMI.     Pre Procedure Diagnosis    worsening angina       Conclusion         Prox LAD lesion is 80% stenosed.    RPDA lesion is 40% stenosed.    Mid RCA lesion is 60% stenosed.     Severe multi-vessel disease with proximal to mid 80% stenosis in the LAD, and a hemodynamically significant (iFR = 0.86) heavily calcified stenosis in the mid-RCA.   Successful IVUS guided PCI of the proximal to mid-LAD with placement of a Synergy XD 3.5 x 24 mm MEGHNA, with excellent angiographic result and JOSE GUADALUPE 3 flow.   Heavily calcified left common femoral artery which is not suitable for closure with device.          Plan     Follow bedrest per protocol   Continued medical management and lifestyle modifications for cardiovascular risk factor optimizations.   Follow up visit with Nurse Practitioner in 1-2 weeks.   Discontinue tobacco  smokingDiscontinue smoking   Return to the primary inpatient team for further evaluation and managmenet        1. Continue bivalirudin gtt at fixed rate of 1.75 mg/kg/hr for 4 hours. Stop and check a PTT after 2 hours. If <180, remove sheath, followed by 6 hours of bedrest.   2. Continue ticagrelor 90 mg twice daily for at least 12 months and Eliquis (start tomorrow evening) indefinitely given history of PE/DVT.   3. Will plan for staged PCI of the mid-RCA in 3-4 weeks once his renal function has stabilized.     Recommendations    General Recommendations:  - Patient given specific instructions regarding care of arteriotomy site, activity restrictions, signs and symptoms of cardiac or vascular complications and to seek immediate medical evaluation should they occur.   - Follow up visit with Nurse Practitioner in 1-2 weeks.   - Discontinue tobacco smoking.       Medications:  - Continue dual antiplatelet therapy for 12 month(s).   - Continue high dose statin therapy indefinitely.   - Risk factor management for atherosclerosis.     Coronary Findings    Diagnostic  Dominance: Right  Left Main   The vessel was visualized by selective angiography and is moderate in size. There was 0% vessel disease.      Left Anterior Descending   The vessel was visualized by selective angiography and is moderate in size. There was moderately calcified diffuse vessel disease.   Prox LAD lesion is 80% stenosed. The lesion is severely calcified. The lesion was not previously treated. The stenosis was measured by a visual reading.      First Diagonal Branch   The vessel is small.      First Septal Branch   The vessel is small.      Second Diagonal Branch   The vessel is moderate in size.      Second Septal Branch   The vessel is small.      Left Circumflex   The vessel was visualized by selective angiography and is moderate in size. There was diffuse vessel disease.      First Obtuse Marginal Branch   The vessel is small.      Second Obtuse  Marginal Branch   The vessel is small. The vessel exhibits minimal luminal irregularities.      Third Obtuse Marginal Branch   The vessel is small.      Right Coronary Artery   The vessel was visualized by selective angiography and is moderate in size. There was severely calcified diffuse vessel disease.   Mid RCA lesion is 60% stenosed. The lesion is severely calcified. Pressure wire/iFR used. Pre adenosine administration IFR: 0.86. The RCA was engaged with a 6 Fr JR 4 GC. We attempted to initially cross the mid-RCA calcified lesion with a Opsense wire however this was unable to cross. We subsequently used a Runthrough wire as a florencia wire which allowed for enough guide stablitiy to faciliate delivery of the Opsense wire distal to the mid-vessel lesion. The lesion was found to be hemodynamically significant with an iFR of 0.86, however, as we had reached the contrast limit for the procedure, we opted to stage such an intervention for the future once his renal function has recovered.      Acute Marginal Branch   The vessel is small.      Right Ventricular Branch   The vessel is small.      Right Posterior Descending Artery   The vessel is moderate in size.   RPDA lesion is 40% stenosed.      Right Posterior Atrioventricular Artery   The vessel is small.      First Right Posterolateral Branch   The vessel is small.      Second Right Posterolateral Branch   The vessel is small.      Third Right Posterolateral Branch   The vessel is small.         Intervention     Prox LAD lesion   Stent   Lesion length: 18 mm. The pre-interventional distal flow is normal (JOSE GUADALUPE 3). A stent was successfully placed. The post-interventional distal flow is normal (JOSE GUADALUPE 3). The LMCA was engaged with a 6 Fr XB 3.5 GC. The lesion in the proximal to mid-LAD was crossed with a Runthrough wire. Pre-dilation was performed with a 2.5 mm and 3.0 mm semi-compliant balloon. A Synergy XD 3.5 x 24 mm MEGHNA was then deployed in the proximal to mid-LAD.  IVUS was used to assess the stent which required additional post-dilation with a 3.5 mm NC balloon. There was an excellent angiographic result and JOSE GUADALUPE 3 flow.   There is a 0% residual stenosis post intervention.           Coronary Angiogram 2/16/24:  Physicians    Panel Physicians Referring Physician Case Authorizing Physician   Osiel Ott MD (Primary) Osiel Ott MD Carlile, Lillie D, APRN CNP Sosa, David, MD (Fellow - Assisting)       Procedures    Panel 1    Primary Surgeon: Osiel Ott MD   Procedure: Percutaneous Coronary Intervention    Coronary Angiogram        Indications    Atherosclerosis of native coronary artery of native heart with stable angina pectoris (H24) [I25.118 (ICD-10-CM)]   Unstable angina (H) [I20.0 (ICD-10-CM)]   Coronary artery disease involving native coronary artery of native heart with unstable angina pectoris (H) [I25.110 (ICD-10-CM)]     Comments/Patient Narrative    The patient is a 67 yo M with PMHx of A1AT deficiency s/p bilateral lung transplant (9/8/2013) c/b 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  He presented in January with an NSTE-ACS and underwent PCI to the culprit LAD and not presented for planned staged PCI to the RCA.     Pre Procedure Diagnosis    stable known CAD       Conclusion         RPDA lesion is 40% stenosed.    Mid RCA lesion is 80% stenosed.     1.  Successful planned PCI to the mid RCA including deployment of a 3.5 x 20 mm Synergy XD MEGHNA that was postdilated with a 4.0 mm NC balloon to 12 jen            Plan     Follow bedrest per protocol   Continued medical management and lifestyle modifications for cardiovascular risk factor optimizations.   Cardiac rehabilitation.   Discharge today per protocol        -Continue bivalirudin drip  at a fixed rate of 1.75 mg/kg/h for 30 minutes after PCI.  2 hours after discontinuation of bivalirudin plan for manual removal of the left femoral sheath given severe known calcifications.  -Continue Plavix 75 mg daily for at least 12 months  -Okay to resume home Eliquis tomorrow     Coronary Findings    Diagnostic  Dominance: Right  Left Anterior Descending   The vessel was not injected.      Left Circumflex   The vessel was not injected.      Right Coronary Artery   The vessel is moderate in size. There was severely calcified diffuse vessel disease.   Mid RCA lesion is 80% stenosed. The lesion is severely calcified.      Acute Marginal Branch   The vessel is small.      Right Ventricular Branch   The vessel is small.      Right Posterior Descending Artery   The vessel is moderate in size.   RPDA lesion is 40% stenosed.      Right Posterior Atrioventricular Artery   The vessel is small.      First Right Posterolateral Branch   The vessel is small.      Second Right Posterolateral Branch   The vessel is small.      Third Right Posterolateral Branch   The vessel is small.         Intervention     Mid RCA lesion   Stent   Lesion length: 12 mm. The pre-interventional distal flow is normal (JOSE GUADALUPE 3). A STENT CORONARY MEGHNA SYNERGY XD MR US [id number] drug eluting stent was successfully placed. The strut is apposed. The post-interventional distal flow is normal (JOSE GUADALUPE 3). The intervention was successful. No complications occurred at this lesion. After initial diagnostic images were obtained, decision was made to proceed to planned PCI to the severe RCA lesion, which on the index procedure had already been shown to be hemodynamically significant by IFR. Therapeutic dose bivalirudin was administered and an elevated ACT was confirmed. The RCA was initially engaged using a 6 Albanian JR4 guide catheter, although very poor support was noted and this was quickly exchanged for a 6 Albanian AL 0.75 modified tipped guide  catheter. The lesion was crossed using a run-through guidewire. Predilation was performed using a 3.0 x 12 mm Emerge balloon inflated to 12 jen. Afterwards a 3.5 x 20 mm Synergy XD drug-eluting stent was deployed across the lesion. This was postdilated using a 4.0 x 15 mm NC Emerge balloon inflated to 12 jen. Afterwards, an excellent angiographic result was observed including 0% residual stenosis, JOSE GUADALUPE-3 flow, visually well opposed stent and no sign of any immediate postprocedural complication including bleeding, perforation or edge dissection.   There is a 0% residual stenosis post intervention.           Pressures Phase: Baseline       Time Systolic (mmHg) Diastolic (mmHg) Mean (mmHg) A Wave (mmHg) V Wave (mmHg) EDP (mmHg) Max dp/dt (mmHg/sec) HR (bpm) Content (mL/dL) SAT (%)   AO Pressures 11:57     56    65                     Assessment and Plan:   Mr. 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     # CAD s/p PCI w/ MEGHNA x1 to LAD (1/11/24), PCI w/ MEGHNA x1 to RCA (2/16/24)  # Dyslipidemia  ED visit 1/11 for exertional chest pain, troponins flat, EKG with no changes. Given risk factors of DM, HLD, and HTN, taken to cath lab for invasive angiogram, which revealed severe multivessel disease. Now s/p MEGHNA x1 to LAD (Synergy XD 3.5 x 24 mm )  Now s/p staged PCI of RCA with MEGHNA x1 (3.5 x 20 mm Synergy XD)  Most recent LDL 60  - DAPT: asa 81mg + Plavix 75mg daily for 1 year, then asa 81 monotherapy lifelong   - Rosuvastatin 20mg MWF   - cont cardiac rehab     # HTN  Well-controlled at home  - cont amlodipine 10mg daily  - carvedilol 6.25mg BID  - lisinopril 40mg every day    CKD III  Baseline creatinine appears to be 0.9-1.1, morning of PCI  creat was 1.17.  - recheck BMP    Follow up:  1 year  Chart review time today: 10 minutes  Visit time today: 8 minutes  Total time spent today: 16 minutes      Please do not hesitate to contact me if you have any questions/concerns.     Sincerely,     BRANDI KNOX CNP

## 2024-02-28 NOTE — NURSING NOTE
Is the patient currently in the state of [place]? YES    Visit mode:VIDEO    If the visit is dropped, the patient can be reconnected by: VIDEO VISIT: Text to cell phone:   Telephone Information:   Mobile [phone]    and VIDEO VISIT: Send to e-mail at: [email or web address]    Will anyone else be joining the visit? Pts wife Kyung  (If patient encounters technical issues they should call [phone])    How would you like to obtain your AVS? MyChart    Are changes needed to the allergy or medication list? No    Patient denies any changes since echeck-in completion and states all information entered during echeck-in remains accurate.    Reason for visit: SALLIE Cordoba MA VVF

## 2024-02-28 NOTE — PATIENT INSTRUCTIONS
You were seen today in the Cardiovascular Clinic at the [facility] by:       BRANDI CARRERA CNP    Your visit summary and instructions are as follows:    No medication changes  BMP lab to monitor kidney function      Return to cardiology clinic in 1 year     Thank you for your visit today!     Please MyChart message me or call my nurse if you have any questions or concerns.      During Business Hours:  [phone], option # 1 (University) then option # 4 (medical questions) and ask to speak with my nurse.     After hours, weekends or holidays:   [phone], Option #4  Ask to speak to the On-Call Cardiologist. Inform them you are a cardiology patient at the [facility].

## 2024-02-28 NOTE — PROGRESS NOTES
Virtual Visit Details    Type of service:  Video Visit   Video Start Time: 4:30 PM  Video End Time:4:38 PM    Originating Location (pt. Location): Home    Distant Location (provider location):  On-site  Platform used for Video Visit: Damion        [facility] - [facility] [place]   [place]iology Clinic Note      HPI:   Mr. 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 He is seen today via video visit for follow up.    He presented to [facility] on 1/11, reported with left-sided/substernal chest pain with exertion that had been occurring for 1 week, decreased at rest. Troponin 22->23, EKG without ischemic changes. He underwent coronary angiogram 1/11/23 showed severe multivessel disease with 80% stenosis in prox-mid LAD and heavily calcified stenosis in mid-RCA. He underwent PCI x1 to LAD, with plan to return for staged PCI.     Today he denies chest pain, palpitations, dizziness, syncope, or lower extremity edema. He notes moderate bruising from DAPT.    Interval History 2/28/24:  Aubrey underwent staged PCI on 2/16 now s/p MEGHNA x1 to RCA. He is feeling well, slight shortness of breath that feels related to his lung disease, he will talk with his pulmonologist about it at upcoming appt.  Mild bruising, no issues with bleeding on DAPT. Has been attending cardiac rehab, BP well controlled 120/70s at most recent visit.      PAST MEDICAL HISTORY:  Past Medical History:   Diagnosis Date    Acute postoperative pain 09/11/2013    Alpha-1-antitrypsin deficiency (H)     Arthritis     Basal cell carcinoma     CMV (cytomegalovirus infection) (H)     Reacttivation Sept 2013 when valcyte held    DVT of upper extremity (deep vein  thrombosis) (H) 2013    Nonocclusive thrombosis extending from the right subclavian vein to the right axillary vein,  Segmental occlusion of right basilic vein in the upper arm. 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 Mother     Cerebrovascular Disease Father          at age 83 with ministrokes; had arthritis as a farmer    Asthma Sister     Diabetes Sister     Hypertension Sister     Other - See Comments Sister         bleeding disorder    Hypertension Daughter     Other - See Comments Daughter         fibromyalgia    Skin Cancer No family hx of     Melanoma No family hx of     Glaucoma No family hx of     Macular Degeneration No family hx of        SOCIAL HISTORY:  Social History     Socioeconomic History    Marital status:      Spouse name: Kyung    Number of children: 1   Occupational History    Occupation: Sanitation business owner; construction     Employer: [employer]   Tobacco Use    Smoking status: Former     Packs/day: 2.00     Years: 15.00     Additional pack years: 0.00     Total pack years: 30.00     Types: Cigarettes     Quit date: 1986     Years since quittin.0     Passive exposure: Past    Smokeless tobacco: Never   Vaping Use    Vaping Use: Never used   Substance and Sexual  Activity    Alcohol use: No     Alcohol/week: 0.0 standard drinks of alcohol    Drug use: No    Sexual activity: Yes     Partners: Female       CURRENT MEDICATIONS:  acetaminophen (TYLENOL) 325 MG tablet, Take 2 tablets (650 mg) by mouth every 6 hours as needed for mild pain  albuterol (PROAIR HFA/PROVENTIL HFA/VENTOLIN HFA) 108 (90 Base) MCG/ACT inhaler, Inhale 1-2 puffs into the lungs every 6 hours as needed for shortness of breath or wheezing  amLODIPine (NORVASC) 10 MG tablet, Take 1 tablet (10 mg) by mouth daily  aspirin (ASA) 81 MG EC tablet, Take 1 tablet (81 mg) by mouth daily  azaTHIOprine (IMURAN) 50 MG tablet, Take 1 tablet (50 mg) by mouth daily  azithromycin (ZITHROMAX) 250 MG tablet, Take 250 mg by mouth Every Mon, Wed, Fri Morning  blood glucose (NO BRAND SPECIFIED) test strip, Use to test blood sugar 3 times daily. Dispense as covered by insurance. Dx. Code: E11.9. Preferred brand: Contour Next.  Calcium Carbonate-Vitamin D 600-10 MG-MCG TABS, Take 1 tablet by mouth 2 times daily (with meals)  carvedilol (COREG) 6.25 MG tablet, TAKE 1 TABLET (6.25 MG) BY MOUTH 2 TIMES DAILY (WITH MEALS)  clopidogrel (PLAVIX) 75 MG tablet, Take 1 tablet (75 mg) by mouth daily  dapsone (ACZONE) 25 MG tablet, Take 2 tablets (50 mg) by mouth daily  econazole nitrate 1 % external cream, APPLY TOPICALLY DAILY TO FEET AND HEELS.  fludrocortisone (FLORINEF) 0.1 MG tablet, Take 1 tablet (0.1 mg) by mouth daily  fluticasone-salmeterol (ADVAIR-HFA) 230-21 MCG/ACT inhaler, Inhale 2 puffs into the lungs 2 times daily  furosemide (LASIX) 20 MG tablet, Take 1 tablet (20 mg) by mouth daily  insulin aspart (NOVOLOG PEN) 100 UNIT/ML pen, Take 5 U am, 3 unit(s) non, 5 unit(s) pm of insulin within 30 minutes of start of breakfast, lunch, and dinner. Do not give if blood sugar is less than 70 mg/dl.  insulin glargine (LANTUS PEN) 100 UNIT/ML pen, Inject 18 Units Subcutaneous every morning (before breakfast)  insulin pen needle (32G X 4  MM) 32G X 4 MM miscellaneous, Use 4 pen needles daily or as directed. Dispense as insurance allows. Dx. Code: E09.9  Lancet Devices (MICROLET NEXT LANCING DEVICE) MISC, USE AS DIRECTED  lisinopril (ZESTRIL) 40 MG tablet, Take 40 mg by mouth daily  loperamide (IMODIUM) 2 MG capsule, Take 1 capsule (2 mg) by mouth 4 times daily as needed for diarrhea  magnesium oxide (MAG-OX) 400 MG tablet, Take 1 tablet (400 mg) by mouth 2 times daily  Microlet Lancets MISC, CHECK BLOOD SUGAR FOUR TIMES DAILY E11.9  montelukast (SINGULAIR) 10 MG tablet, Take 1 tablet (10 mg) by mouth every evening  multivitamin, therapeutic (THERA-VIT) TABS, Take 1 tablet by mouth daily  omeprazole (PRILOSEC) 20 MG DR capsule, Take 1 capsule (20 mg) by mouth 2 times daily (before meals)  order for DME, Equipment being ordered: diabetic shoes  predniSONE (DELTASONE) 5 MG tablet, TAKE ONE TABLET BY MOUTH ONCE DAILY IN THE MORNING AND ONE-HALF TABLET IN THE EVENING  rosuvastatin (CRESTOR) 40 MG tablet, Take 20 mg by mouth Every Mon, Wed, Fri Morning  sildenafil (VIAGRA) 25 MG tablet, Take 1 tablet (25 mg) by mouth as needed (as needed)  tacrolimus (GENERIC) 0.5 MG capsule, Take 1 capsule (0.5 mg) by mouth every evening Total dose: 2 mg in the AM and 2.5 mg in the PM  tacrolimus (GENERIC) 1 MG capsule, Take 2 capsules (2 mg) by mouth 2 times daily Total dose: 2 mg in AM and 2.5 mg in PM    No current facility-administered medications on file prior to visit.      ROS:   Refer to HPI    EXAM:  There were no vitals taken for this visit.  GENERAL: alert and no distress  EYES: Eyes grossly normal to inspection.  No discharge or erythema, or obvious scleral/conjunctival abnormalities.  RESP: No audible wheeze, cough, or visible cyanosis.    SKIN: Visible skin clear. No significant rash, abnormal pigmentation or lesions.  NEURO: Cranial nerves grossly intact.  Mentation and speech appropriate for age.  PSYCH: Appropriate affect, tone, and pace of  "words      Labs, reviewed with patient in clinic today:  CBC RESULTS:  Lab Results   Component Value Date    WBC 9.7 02/16/2024    WBC 6.2 06/03/2021    RBC 3.43 (L) 02/16/2024    RBC 3.83 (L) 06/03/2021    HGB 11.3 (L) 02/16/2024    HGB 13.0 (L) 06/03/2021    HCT 36.8 (L) 02/16/2024    HCT 40.1 06/03/2021     (H) 02/16/2024     (H) 06/03/2021    MCH 32.9 02/16/2024    MCH 33.9 (H) 06/03/2021    MCHC 30.7 (L) 02/16/2024    MCHC 32.4 06/03/2021    RDW 15.2 (H) 02/16/2024    RDW 13.1 06/03/2021     02/16/2024     06/03/2021       CMP RESULTS:  Lab Results   Component Value Date     02/16/2024     06/03/2021    POTASSIUM 5.0 02/16/2024    POTASSIUM 4.5 10/26/2022    POTASSIUM 4.6 06/03/2021    CHLORIDE 106 02/16/2024    CHLORIDE 110 (H) 11/09/2022    CHLORIDE 105 06/03/2021    CO2 26 02/16/2024    CO2 28 10/26/2022    CO2 26 06/03/2021    ANIONGAP 9 02/16/2024    ANIONGAP 6 10/26/2022    ANIONGAP 9 06/03/2021    GLC 82 02/16/2024     (H) 02/16/2024    GLC 92 10/26/2022    GLC 96 06/03/2021    BUN 36.8 (H) 02/16/2024    BUN 36 (H) 10/26/2022    BUN 38 (H) 06/03/2021    CR 1.17 02/16/2024    CR 1.29 06/03/2021    GFRESTIMATED 67 02/16/2024    GFRESTIMATED 59 (L) 07/21/2022    GFRESTIMATED 56 (L) 06/03/2021    GFRESTBLACK 67 06/03/2021    ERIN 9.3 02/16/2024    ERIN 8.9 06/03/2021    BILITOTAL 0.7 01/12/2024    BILITOTAL 0.3 05/12/2021    ALBUMIN 3.6 01/12/2024    ALBUMIN 3.9 10/12/2022    ALBUMIN 3.8 05/12/2021    ALKPHOS 57 01/12/2024    ALKPHOS 38 05/12/2021    ALT 46 01/12/2024    ALT 55 (H) 05/12/2021    AST 37 01/12/2024    AST 43 (H) 05/12/2021        INR RESULTS:  Lab Results   Component Value Date    INR 0.99 02/16/2024    INR 1.00 10/30/2020       Lab Results   Component Value Date    MAG 1.9 01/12/2024    MAG 1.8 (L) 06/03/2021     Lab Results   Component Value Date    NTBNPI 212 (H) 02/24/2019     No results found for: \"NTBNP\"    LIPIDS:  Recent Labs   Lab Test " 24  0425 22  0930   CHOL 134 122   HDL 38* 44   LDL 60 51   TRIG 179* 135         Echocardiogram:  Recent Results (from the past 4320 hour(s))   Echocardiogram Complete   Result Value    LVEF  55-60%    [facility]    [id number]  [id number]  [id number]  [id number]^CAROLEVICTORINA^VINNY     [facility],[place]  Echocardiography Laboratory  [address] [place] [address]     Name: YARED HAMLIN  MRN: [id number]  : 1955  Study Date: 2024 09:15 AM  Age: 68 yrs  Gender: Male  [place] [facility] [place]  Reason For Study: Unstable Angina  Ordering Physician: VINNY JORGENSEN  Referring Physician: CARLOS MC  Performed By: Abe Marino RDCS     BSA: 1.9 m2  Height: 68 in  Weight: 162 lb  HR: 73  BP: 137/84 mmHg  ______________________________________________________________________________  Procedure  Complete Portable Echo Adult. Contrast Optison. Optison (NDC #1996-2186-78)  given intravenously. Patient was given 6 ml mixture of 3 ml Optison and 6 ml  saline. 3 ml wasted.  ______________________________________________________________________________  Interpretation Summary  Global and regional left ventricular function is normal with an EF of 55-60%.  The right ventricle is normal size. Global right ventricular function is  normal.  No significant valvular disease.  No pericardial effusion.  Normal IVC.     This study was compared with the study from 2012. There has been no  change.  ______________________________________________________________________________  Left Ventricle  Left ventricular size is normal. Global and regional left ventricular function  is normal with an EF of 55-60%. Mild concentric wall thickening consistent  with left ventricular hypertrophy is present. Left ventricular diastolic  function is indeterminate. Diastolic Doppler findings (E/E' ratio and/or other  parameters) suggest left ventricular filling pressures are normal. No regional  wall  motion abnormalities are seen.     Right Ventricle  Global right ventricular function is normal. The right ventricle is normal  size.     Atria  The right atria appears normal. Moderate left atrial enlargement is present.     Mitral Valve  The mitral valve is normal.     Aortic Valve  Aortic valve is normal in structure and function. The aortic valve is  tricuspid.     Tricuspid Valve  The tricuspid valve is normal. The peak velocity of the tricuspid regurgitant  jet is not obtainable. Pulmonary artery systolic pressure cannot be assessed.     Pulmonic Valve  The pulmonic valve is normal.     Vessels  The inferior vena cava was normal in size with preserved respiratory  variability. The aorta root is normal. The thoracic aorta is normal. Sinuses  of Valsalva 3.5 cm. Ascending aorta 3.5 cm.     Pericardium  Prominent epicardial fat is noted. No pericardial effusion is present.     Compared to Previous Study  This study was compared with the study from 2012 . There has been no  change.     Attestation  I have personally viewed the imaging and agree with the interpretation and  report as documented by the fellow, Jorge Luis Reyes Castro, and/or edited by  me.  ______________________________________________________________________________  MMode/2D Measurements & Calculations  IVSd: 1.2 cm  LVIDd: 5.0 cm  LVIDs: 3.7 cm  LVPWd: 1.2 cm  FS: 27.1 %  LV mass(C)d: 237.5 grams  LV mass(C)dI: 127.1 grams/m2  asc Aorta Diam: 3.5 cm  LVOT diam: 2.8 cm  LVOT area: 6.0 cm2  Asc Ao diam index BSA (cm/m2): 1.9  Asc Ao diam index Ht(cm/m): 2.0     LA Volume Index (BP): 43.9 ml/m2  RWT: 0.47  TAPSE: 1.4 cm     Doppler Measurements & Calculations  MV E max chriss: 38.2 cm/sec  MV A max chriss: 67.8 cm/sec  MV E/A: 0.56  MV dec slope: 223.9 cm/sec2  MV dec time: 0.17 sec  PA acc time: 0.10 sec  E/E' av.5  Lateral E/e': 10.1  Medial E/e': 5.0     ______________________________________________________________________________  Report  approved by: Rach Pastor 01/11/2024 11:04 AM             Coronary Angiogram 1/11/2024:  Physicians    Panel Physicians Referring Physician Case Authorizing Physician   Osiel Ott MD (Primary) Silke Ward, Yoko Flynn APRN CNP Basrawala, Hussain Z, MD (Fellow - Assisting)     Edwin Sam MD (Fellow - Assisting)       Procedures    Panel 1    Primary Surgeon: Osiel Ott MD   Procedure: Coronary Angiogram    Percutaneous Coronary Intervention        Indications    Unstable angina (H) [I20.0 (ICD-10-CM)]     Comments/Patient Narrative    The patient is a 67 yo M with PMHx of A1AT deficiency s/p bilateral lung transplant (9/8/2013) c/b 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  He presents to the cath lab today for a coronary angiogram +/- PCI in the setting of possible NSTEMI.     Pre Procedure Diagnosis    worsening angina       Conclusion         Prox LAD lesion is 80% stenosed.    RPDA lesion is 40% stenosed.    Mid RCA lesion is 60% stenosed.     Severe multi-vessel disease with proximal to mid 80% stenosis in the LAD, and a hemodynamically significant (iFR = 0.86) heavily calcified stenosis in the mid-RCA.   Successful IVUS guided PCI of the proximal to mid-LAD with placement of a Synergy XD 3.5 x 24 mm MEGHNA, with excellent angiographic result and JOSE GUADALUPE 3 flow.   Heavily calcified left common femoral artery which is not suitable for closure with device.          Plan     Follow bedrest per protocol   Continued medical management and lifestyle modifications for cardiovascular risk factor optimizations.   Follow up visit with Nurse Practitioner in 1-2 weeks.   Discontinue tobacco smokingDiscontinue smoking   Return to the primary inpatient team for further evaluation and  managmenet        1. Continue bivalirudin gtt at fixed rate of 1.75 mg/kg/hr for 4 hours. Stop and check a PTT after 2 hours. If <180, remove sheath, followed by 6 hours of bedrest.   2. Continue ticagrelor 90 mg twice daily for at least 12 months and Eliquis (start tomorrow evening) indefinitely given history of PE/DVT.   3. Will plan for staged PCI of the mid-RCA in 3-4 weeks once his renal function has stabilized.     Recommendations    General Recommendations:  - Patient given specific instructions regarding care of arteriotomy site, activity restrictions, signs and symptoms of cardiac or vascular complications and to seek immediate medical evaluation should they occur.   - Follow up visit with Nurse Practitioner in 1-2 weeks.   - Discontinue tobacco smoking.       Medications:  - Continue dual antiplatelet therapy for 12 month(s).   - Continue high dose statin therapy indefinitely.   - Risk factor management for atherosclerosis.     Coronary Findings    Diagnostic  Dominance: Right  Left Main   The vessel was visualized by selective angiography and is moderate in size. There was 0% vessel disease.      Left Anterior Descending   The vessel was visualized by selective angiography and is moderate in size. There was moderately calcified diffuse vessel disease.   Prox LAD lesion is 80% stenosed. The lesion is severely calcified. The lesion was not previously treated. The stenosis was measured by a visual reading.      First Diagonal Branch   The vessel is small.      First Septal Branch   The vessel is small.      Second Diagonal Branch   The vessel is moderate in size.      Second Septal Branch   The vessel is small.      Left Circumflex   The vessel was visualized by selective angiography and is moderate in size. There was diffuse vessel disease.      First Obtuse Marginal Branch   The vessel is small.      Second Obtuse Marginal Branch   The vessel is small. The vessel exhibits minimal luminal irregularities.       Third Obtuse Marginal Branch   The vessel is small.      Right Coronary Artery   The vessel was visualized by selective angiography and is moderate in size. There was severely calcified diffuse vessel disease.   Mid RCA lesion is 60% stenosed. The lesion is severely calcified. Pressure wire/iFR used. Pre adenosine administration IFR: 0.86. The RCA was engaged with a 6 Fr JR 4 GC. We attempted to initially cross the mid-RCA calcified lesion with a Opsense wire however this was unable to cross. We subsequently used a Runthrough wire as a florencia wire which allowed for enough guide stablitiy to faciliate delivery of the Opsense wire distal to the mid-vessel lesion. The lesion was found to be hemodynamically significant with an iFR of 0.86, however, as we had reached the contrast limit for the procedure, we opted to stage such an intervention for the future once his renal function has recovered.      Acute Marginal Branch   The vessel is small.      Right Ventricular Branch   The vessel is small.      Right Posterior Descending Artery   The vessel is moderate in size.   RPDA lesion is 40% stenosed.      Right Posterior Atrioventricular Artery   The vessel is small.      First Right Posterolateral Branch   The vessel is small.      Second Right Posterolateral Branch   The vessel is small.      Third Right Posterolateral Branch   The vessel is small.         Intervention     Prox LAD lesion   Stent   Lesion length: 18 mm. The pre-interventional distal flow is normal (JOSE GUADALUPE 3). A stent was successfully placed. The post-interventional distal flow is normal (JOSE GUADALUPE 3). The LMCA was engaged with a 6 Fr XB 3.5 GC. The lesion in the proximal to mid-LAD was crossed with a Runthrough wire. Pre-dilation was performed with a 2.5 mm and 3.0 mm semi-compliant balloon. A Synergy XD 3.5 x 24 mm MEGHNA was then deployed in the proximal to mid-LAD. IVUS was used to assess the stent which required additional post-dilation with a 3.5 mm NC  balloon. There was an excellent angiographic result and JOSE GUADALUPE 3 flow.   There is a 0% residual stenosis post intervention.           Coronary Angiogram 2/16/24:  Physicians    Panel Physicians Referring Physician Case Authorizing Physician   Osiel Ott MD (Primary) Osiel Ott MD Carlile, Lillie D, APRN CNP Sosa, David, MD (Fellow - Assisting)       Procedures    Panel 1    Primary Surgeon: Osiel Ott MD   Procedure: Percutaneous Coronary Intervention    Coronary Angiogram        Indications    Atherosclerosis of native coronary artery of native heart with stable angina pectoris (H24) [I25.118 (ICD-10-CM)]   Unstable angina (H) [I20.0 (ICD-10-CM)]   Coronary artery disease involving native coronary artery of native heart with unstable angina pectoris (H) [I25.110 (ICD-10-CM)]     Comments/Patient Narrative    The patient is a 69 yo M with PMHx of A1AT deficiency s/p bilateral lung transplant (9/8/2013) c/b 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  He presented in January with an NSTE-ACS and underwent PCI to the culprit LAD and not presented for planned staged PCI to the RCA.     Pre Procedure Diagnosis    stable known CAD       Conclusion         RPDA lesion is 40% stenosed.    Mid RCA lesion is 80% stenosed.     1.  Successful planned PCI to the mid RCA including deployment of a 3.5 x 20 mm Synergy XD MEGHNA that was postdilated with a 4.0 mm NC balloon to 12 jen            Plan     Follow bedrest per protocol   Continued medical management and lifestyle modifications for cardiovascular risk factor optimizations.   Cardiac rehabilitation.   Discharge today per protocol        -Continue bivalirudin drip at a fixed rate of 1.75 mg/kg/h for 30 minutes after PCI.  2 hours after discontinuation  of bivalirudin plan for manual removal of the left femoral sheath given severe known calcifications.  -Continue Plavix 75 mg daily for at least 12 months  -Okay to resume home Eliquis tomorrow     Coronary Findings    Diagnostic  Dominance: Right  Left Anterior Descending   The vessel was not injected.      Left Circumflex   The vessel was not injected.      Right Coronary Artery   The vessel is moderate in size. There was severely calcified diffuse vessel disease.   Mid RCA lesion is 80% stenosed. The lesion is severely calcified.      Acute Marginal Branch   The vessel is small.      Right Ventricular Branch   The vessel is small.      Right Posterior Descending Artery   The vessel is moderate in size.   RPDA lesion is 40% stenosed.      Right Posterior Atrioventricular Artery   The vessel is small.      First Right Posterolateral Branch   The vessel is small.      Second Right Posterolateral Branch   The vessel is small.      Third Right Posterolateral Branch   The vessel is small.         Intervention     Mid RCA lesion   Stent   Lesion length: 12 mm. The pre-interventional distal flow is normal (JOSE GUADALUPE 3). A STENT CORONARY MEGHNA SYNERGY XD MR US [id number] drug eluting stent was successfully placed. The strut is apposed. The post-interventional distal flow is normal (JOSE GUADALUPE 3). The intervention was successful. No complications occurred at this lesion. After initial diagnostic images were obtained, decision was made to proceed to planned PCI to the severe RCA lesion, which on the index procedure had already been shown to be hemodynamically significant by IFR. Therapeutic dose bivalirudin was administered and an elevated ACT was confirmed. The RCA was initially engaged using a 6 Jamaican JR4 guide catheter, although very poor support was noted and this was quickly exchanged for a 6 Jamaican AL 0.75 modified tipped guide catheter. The lesion was crossed using a run-through guidewire. Predilation was performed  using a 3.0 x 12 mm Emerge balloon inflated to 12 jen. Afterwards a 3.5 x 20 mm Synergy XD drug-eluting stent was deployed across the lesion. This was postdilated using a 4.0 x 15 mm NC Emerge balloon inflated to 12 jen. Afterwards, an excellent angiographic result was observed including 0% residual stenosis, JOSE GUADALUPE-3 flow, visually well opposed stent and no sign of any immediate postprocedural complication including bleeding, perforation or edge dissection.   There is a 0% residual stenosis post intervention.           Pressures Phase: Baseline       Time Systolic (mmHg) Diastolic (mmHg) Mean (mmHg) A Wave (mmHg) V Wave (mmHg) EDP (mmHg) Max dp/dt (mmHg/sec) HR (bpm) Content (mL/dL) SAT (%)   AO Pressures 11:57     56    65                     Assessment and Plan:   Mr. 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     # CAD s/p PCI w/ MEGHNA x1 to LAD (1/11/24), PCI w/ MEGHNA x1 to RCA (2/16/24)  # Dyslipidemia  ED visit 1/11 for exertional chest pain, troponins flat, EKG with no changes. Given risk factors of DM, HLD, and HTN, taken to cath lab for invasive angiogram, which revealed severe multivessel disease. Now s/p MEGHNA x1 to LAD (Synergy XD 3.5 x 24 mm )  Now s/p staged PCI of RCA with MEGHNA x1 (3.5 x 20 mm Synergy XD)  Most recent LDL 60  - DAPT: asa 81mg + Plavix 75mg daily for 1 year, then asa 81 monotherapy lifelong   - Rosuvastatin 20mg MWF   - cont cardiac rehab     # HTN  Well-controlled at home  - cont amlodipine 10mg daily  - carvedilol 6.25mg BID  - lisinopril 40mg every day    CKD III  Baseline creatinine appears to be 0.9-1.1, morning of PCI creat was 1.17.  - recheck BMP    Follow up:  1 year  Chart review time today: 10  minutes  Visit time today: 8 minutes  Total time spent today: 16 minutes      BRANDI KNOX CNP  General Cardiology   02/28/24

## 2024-02-29 ENCOUNTER — MYC MEDICAL ADVICE (OUTPATIENT)
Dept: ENDOCRINOLOGY | Facility: CLINIC | Age: 69
End: 2024-02-29
Payer: MEDICARE

## 2024-02-29 ENCOUNTER — LAB (OUTPATIENT)
Dept: LAB | Facility: OTHER | Age: 69
End: 2024-02-29
Attending: CASE MANAGER/CARE COORDINATOR
Payer: MEDICARE

## 2024-02-29 ENCOUNTER — TELEPHONE (OUTPATIENT)
Dept: TRANSPLANT | Facility: CLINIC | Age: 69
End: 2024-02-29

## 2024-02-29 ENCOUNTER — PATIENT OUTREACH (OUTPATIENT)
Dept: GASTROENTEROLOGY | Facility: CLINIC | Age: 69
End: 2024-02-29
Payer: MEDICARE

## 2024-02-29 DIAGNOSIS — N18.30 STAGE 3 CHRONIC KIDNEY DISEASE, UNSPECIFIED WHETHER STAGE 3A OR 3B CKD (H): ICD-10-CM

## 2024-02-29 DIAGNOSIS — Z94.2 LUNG REPLACED BY TRANSPLANT (H): ICD-10-CM

## 2024-02-29 DIAGNOSIS — L08.9 INFECTION OF TOE: ICD-10-CM

## 2024-02-29 DIAGNOSIS — E87.5 HYPERKALEMIA: Primary | ICD-10-CM

## 2024-02-29 DIAGNOSIS — E11.51 DIABETES MELLITUS WITH PERIPHERAL VASCULAR DISEASE (H): Primary | ICD-10-CM

## 2024-02-29 DIAGNOSIS — T86.810 CHRONIC REJECTION OF ALLOGRAFT LUNG (H): ICD-10-CM

## 2024-02-29 DIAGNOSIS — E11.49 TYPE II OR UNSPECIFIED TYPE DIABETES MELLITUS WITH NEUROLOGICAL MANIFESTATIONS, NOT STATED AS UNCONTROLLED(250.60) (H): ICD-10-CM

## 2024-02-29 DIAGNOSIS — Z94.2 S/P LUNG TRANSPLANT (H): ICD-10-CM

## 2024-02-29 LAB
ANION GAP SERPL CALCULATED.3IONS-SCNC: 9 MMOL/L (ref 7–15)
BUN SERPL-MCNC: 29.4 MG/DL (ref 8–23)
CALCIUM SERPL-MCNC: 8.9 MG/DL (ref 8.8–10.2)
CHLORIDE SERPL-SCNC: 109 MMOL/L (ref 98–107)
CREAT SERPL-MCNC: 1.19 MG/DL (ref 0.67–1.17)
DEPRECATED HCO3 PLAS-SCNC: 24 MMOL/L (ref 22–29)
EGFRCR SERPLBLD CKD-EPI 2021: 66 ML/MIN/1.73M2
GLUCOSE SERPL-MCNC: 148 MG/DL (ref 70–99)
POTASSIUM SERPL-SCNC: 5.4 MMOL/L (ref 3.4–5.3)
SODIUM SERPL-SCNC: 142 MMOL/L (ref 135–145)

## 2024-02-29 PROCEDURE — 36415 COLL VENOUS BLD VENIPUNCTURE: CPT | Mod: ZL

## 2024-02-29 PROCEDURE — 80048 BASIC METABOLIC PNL TOTAL CA: CPT | Mod: ZL

## 2024-02-29 RX ORDER — CEPHALEXIN 500 MG/1
500 CAPSULE ORAL 2 TIMES DAILY
Qty: 28 CAPSULE | Refills: 0 | Status: SHIPPED | OUTPATIENT
Start: 2024-02-29 | End: 2024-03-12

## 2024-02-29 RX ORDER — FLUTICASONE PROPIONATE AND SALMETEROL XINAFOATE 230; 21 UG/1; UG/1
2 AEROSOL, METERED RESPIRATORY (INHALATION) 2 TIMES DAILY
Qty: 8 G | Refills: 11 | Status: SHIPPED | OUTPATIENT
Start: 2024-02-29 | End: 2024-05-02

## 2024-02-29 RX ORDER — SODIUM POLYSTYRENE SULFONATE 15 G/60ML
15 SUSPENSION ORAL; RECTAL DAILY
Qty: 240 ML | Refills: 0 | Status: SHIPPED | OUTPATIENT
Start: 2024-02-29 | End: 2024-03-04

## 2024-02-29 NOTE — PROGRESS NOTES
Patient called with concerns for infection of the little toe.  Prescription for Keflex given and and wound cares with cleaning this and putting light dressing on it.

## 2024-02-29 NOTE — LETTER
PHYSICIAN ORDERS      DATE & TIME ISSUED: 2024 4:37 PM  PATIENT NAME: Aubrey Duncan   : 1955     [facility] MR# [if applicable]: [id number]     DIAGNOSIS:  Lung Transplant  Z94.2    Please check a BMP week of 3    Any questions please call: Anali at [phone]    Please fax these results to [phone].         Alma Murphy MD  Pulmonary Disease

## 2024-02-29 NOTE — TELEPHONE ENCOUNTER
Called     I would like to organize an interval EUS (deep sedation, either location) with repeat sampling in  (April 2024)  Patient would like EUS on 4/10, not booked yet.  Patient recenlty had cardiac stents placed, is on Plavix. Message sent to cardiology team asking if patient ok to hold plavix related to EUS    ML

## 2024-02-29 NOTE — TELEPHONE ENCOUNTER
Called patient regarding potassium level 5.4.  Patient reports he ate potato soup and a banana.  Instructed patient to avoid foods high in potassium at this time.  Instructed patient to drink plenty of fluids.    Per Dr. Aponte will treat patient with Kayexalate x 1.  Sent a couple extra doses for patient to have for future need.    Will recheck a BMP week of 3/4.  Orders faxed

## 2024-03-01 ENCOUNTER — TELEPHONE (OUTPATIENT)
Dept: TRANSPLANT | Facility: CLINIC | Age: 69
End: 2024-03-01
Payer: MEDICARE

## 2024-03-01 ENCOUNTER — HOSPITAL ENCOUNTER (OUTPATIENT)
Dept: CARDIAC REHAB | Facility: OTHER | Age: 69
Discharge: HOME | End: 2024-03-01
Attending: NURSE PRACTITIONER
Payer: MEDICARE

## 2024-03-01 DIAGNOSIS — Z94.2 LUNG REPLACED BY TRANSPLANT (H): Primary | ICD-10-CM

## 2024-03-01 PROCEDURE — 93798 PHYS/QHP OP CAR RHAB W/ECG: CPT

## 2024-03-01 NOTE — TELEPHONE ENCOUNTER
Patient just calling to confirm he is to take one dose of Kayexelate for K of 5.4. Confirmed this with the patient and that the other 3 are just to have on hand but not to take now.     Patient will recheck labs early next week.

## 2024-03-02 LAB — CMV DNA SPEC NAA+PROBE-ACNC: NOT DETECTED IU/ML

## 2024-03-04 ENCOUNTER — OFFICE VISIT (OUTPATIENT)
Dept: PODIATRY | Facility: CLINIC | Age: 69
End: 2024-03-04
Payer: MEDICARE

## 2024-03-04 DIAGNOSIS — E11.51 DIABETES MELLITUS WITH PERIPHERAL VASCULAR DISEASE (H): Primary | ICD-10-CM

## 2024-03-04 DIAGNOSIS — S90.415S: ICD-10-CM

## 2024-03-04 DIAGNOSIS — E11.49 TYPE II OR UNSPECIFIED TYPE DIABETES MELLITUS WITH NEUROLOGICAL MANIFESTATIONS, NOT STATED AS UNCONTROLLED(250.60) (H): ICD-10-CM

## 2024-03-04 DIAGNOSIS — L08.9 INFECTION OF TOE: ICD-10-CM

## 2024-03-04 DIAGNOSIS — S90.414S: ICD-10-CM

## 2024-03-04 PROCEDURE — 99214 OFFICE O/P EST MOD 30 MIN: CPT | Performed by: PODIATRIST
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
## 2024-03-04 NOTE — NURSING NOTE
"Aubrey Duncan's chief complaint for this visit includes:  Chief Complaint   Patient presents with    Consult     Foot check      PCP: Hoa Olivarez    Referring Provider:  No referring provider defined for this encounter.    There were no vitals taken for this visit.  Data Unavailable     Do you need any medication refills at today's visit? NO    Allergies   Allergen Reactions    Heparin Other (See Comments)     HIT positive and AUGUST positive    No Heparin Antibody Identified on 8/15 blood test    Oxycodone Other (See Comments)     Significant lethargy, confusion. Tolerates dilaudid well.     Fluocinolone Other (See Comments)     Tendon problems      Levaquin Muscle Pain (Myalgia)    Pneumococcal Vaccine Other (See Comments)     Other reaction(s): Fever  \"My arm swelled up like a balloon.\"    Varicella Zoster Immune Globulin Swelling       Charley Barcenas LPN  "
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
## 2024-03-05 ENCOUNTER — PATIENT OUTREACH (OUTPATIENT)
Dept: GASTROENTEROLOGY | Facility: CLINIC | Age: 69
End: 2024-03-05
Payer: MEDICARE

## 2024-03-05 NOTE — TELEPHONE ENCOUNTER
Per Dr. Cortez related to follow up EUS and holding of anticoagualation after cardiac stent placement:   Mid May for EUS and we will hold plavix most likely only before and day of procedure. Aspirin can continue    This plan confirmed Dr Ott    Discussed plan with patient, that I will call in April to check in about scheduling follow up EUS in May 2024. See A/c hold recommendations above. Pt ok with this plan.    ML

## 2024-03-06 ENCOUNTER — HOSPITAL ENCOUNTER (OUTPATIENT)
Dept: CARDIAC REHAB | Facility: OTHER | Age: 69
Discharge: HOME | End: 2024-03-06
Attending: NURSE PRACTITIONER
Payer: MEDICARE

## 2024-03-06 ENCOUNTER — LAB (OUTPATIENT)
Dept: LAB | Facility: OTHER | Age: 69
End: 2024-03-06
Attending: INTERNAL MEDICINE
Payer: MEDICARE

## 2024-03-06 DIAGNOSIS — Z94.2 LUNG REPLACED BY TRANSPLANT (H): ICD-10-CM

## 2024-03-06 LAB
ANION GAP SERPL CALCULATED.3IONS-SCNC: 11 MMOL/L (ref 7–15)
BUN SERPL-MCNC: 34.4 MG/DL (ref 8–23)
CALCIUM SERPL-MCNC: 8.9 MG/DL (ref 8.8–10.2)
CHLORIDE SERPL-SCNC: 107 MMOL/L (ref 98–107)
CREAT SERPL-MCNC: 1.18 MG/DL (ref 0.67–1.17)
DEPRECATED HCO3 PLAS-SCNC: 24 MMOL/L (ref 22–29)
EGFRCR SERPLBLD CKD-EPI 2021: 66 ML/MIN/1.73M2
GLUCOSE SERPL-MCNC: 81 MG/DL (ref 70–99)
POTASSIUM SERPL-SCNC: 4.5 MMOL/L (ref 3.4–5.3)
SODIUM SERPL-SCNC: 142 MMOL/L (ref 135–145)

## 2024-03-06 PROCEDURE — 80048 BASIC METABOLIC PNL TOTAL CA: CPT | Mod: ZL

## 2024-03-06 PROCEDURE — 93798 PHYS/QHP OP CAR RHAB W/ECG: CPT

## 2024-03-06 PROCEDURE — 36415 COLL VENOUS BLD VENIPUNCTURE: CPT | Mod: ZL

## 2024-03-08 ENCOUNTER — HOSPITAL ENCOUNTER (OUTPATIENT)
Dept: CARDIAC REHAB | Facility: OTHER | Age: 69
Discharge: HOME | End: 2024-03-08
Attending: NURSE PRACTITIONER
Payer: MEDICARE

## 2024-03-08 PROCEDURE — 93798 PHYS/QHP OP CAR RHAB W/ECG: CPT

## 2024-03-11 ENCOUNTER — HOSPITAL ENCOUNTER (OUTPATIENT)
Dept: CARDIAC REHAB | Facility: OTHER | Age: 69
Discharge: HOME | End: 2024-03-11
Attending: NURSE PRACTITIONER
Payer: MEDICARE

## 2024-03-11 DIAGNOSIS — Z79.4 TYPE 2 DIABETES MELLITUS WITH DIABETIC POLYNEUROPATHY, WITH LONG-TERM CURRENT USE OF INSULIN (H): ICD-10-CM

## 2024-03-11 DIAGNOSIS — E11.42 TYPE 2 DIABETES MELLITUS WITH DIABETIC POLYNEUROPATHY, WITH LONG-TERM CURRENT USE OF INSULIN (H): ICD-10-CM

## 2024-03-11 PROCEDURE — 93798 PHYS/QHP OP CAR RHAB W/ECG: CPT

## 2024-03-12 ENCOUNTER — MYC MEDICAL ADVICE (OUTPATIENT)
Dept: PODIATRY | Facility: CLINIC | Age: 69
End: 2024-03-12
Payer: MEDICARE

## 2024-03-12 DIAGNOSIS — E11.51 DIABETES MELLITUS WITH PERIPHERAL VASCULAR DISEASE (H): ICD-10-CM

## 2024-03-12 DIAGNOSIS — L08.9 INFECTION OF TOE: ICD-10-CM

## 2024-03-12 DIAGNOSIS — E11.49 TYPE II OR UNSPECIFIED TYPE DIABETES MELLITUS WITH NEUROLOGICAL MANIFESTATIONS, NOT STATED AS UNCONTROLLED(250.60) (H): ICD-10-CM

## 2024-03-12 RX ORDER — CEPHALEXIN 500 MG/1
500 CAPSULE ORAL 2 TIMES DAILY
Qty: 28 CAPSULE | Refills: 0 | Status: SHIPPED | OUTPATIENT
Start: 2024-03-12 | End: 2024-03-13

## 2024-03-13 ENCOUNTER — OFFICE VISIT (OUTPATIENT)
Dept: FAMILY MEDICINE | Facility: OTHER | Age: 69
End: 2024-03-13
Payer: MEDICARE

## 2024-03-13 ENCOUNTER — HOSPITAL ENCOUNTER (OUTPATIENT)
Dept: GENERAL RADIOLOGY | Facility: OTHER | Age: 69
Discharge: HOME | End: 2024-03-13
Payer: MEDICARE

## 2024-03-13 ENCOUNTER — HOSPITAL ENCOUNTER (OUTPATIENT)
Dept: CARDIAC REHAB | Facility: OTHER | Age: 69
Discharge: HOME | End: 2024-03-13
Attending: NURSE PRACTITIONER
Payer: MEDICARE

## 2024-03-13 VITALS
SYSTOLIC BLOOD PRESSURE: 118 MMHG | TEMPERATURE: 97.7 F | OXYGEN SATURATION: 98 % | HEIGHT: 68 IN | BODY MASS INDEX: 25.25 KG/M2 | DIASTOLIC BLOOD PRESSURE: 62 MMHG | HEART RATE: 80 BPM | RESPIRATION RATE: 20 BRPM | WEIGHT: 166.6 LBS

## 2024-03-13 DIAGNOSIS — L08.9 INFECTION OF RIGHT FOOT: ICD-10-CM

## 2024-03-13 DIAGNOSIS — E11.51 DIABETES MELLITUS WITH PERIPHERAL VASCULAR DISEASE (H): ICD-10-CM

## 2024-03-13 DIAGNOSIS — L03.115 CELLULITIS OF RIGHT FOOT: Primary | ICD-10-CM

## 2024-03-13 DIAGNOSIS — M79.671 RIGHT FOOT PAIN: ICD-10-CM

## 2024-03-13 DIAGNOSIS — E11.49 TYPE II OR UNSPECIFIED TYPE DIABETES MELLITUS WITH NEUROLOGICAL MANIFESTATIONS, NOT STATED AS UNCONTROLLED(250.60) (H): ICD-10-CM

## 2024-03-13 DIAGNOSIS — M79.89 SWELLING OF RIGHT FOOT: ICD-10-CM

## 2024-03-13 DIAGNOSIS — I10 ESSENTIAL HYPERTENSION: Primary | Chronic | ICD-10-CM

## 2024-03-13 DIAGNOSIS — L08.9 INFECTION OF TOE: ICD-10-CM

## 2024-03-13 LAB
ANION GAP SERPL CALCULATED.3IONS-SCNC: 11 MMOL/L (ref 7–15)
BASOPHILS # BLD AUTO: 0 10E3/UL (ref 0–0.2)
BASOPHILS NFR BLD AUTO: 0 %
BUN SERPL-MCNC: 36.5 MG/DL (ref 8–23)
CALCIUM SERPL-MCNC: 8.8 MG/DL (ref 8.8–10.2)
CHLORIDE SERPL-SCNC: 108 MMOL/L (ref 98–107)
CREAT SERPL-MCNC: 1.11 MG/DL (ref 0.67–1.17)
DEPRECATED HCO3 PLAS-SCNC: 23 MMOL/L (ref 22–29)
EGFRCR SERPLBLD CKD-EPI 2021: 71 ML/MIN/1.73M2
EOSINOPHIL # BLD AUTO: 0.1 10E3/UL (ref 0–0.7)
EOSINOPHIL NFR BLD AUTO: 2 %
ERYTHROCYTE [DISTWIDTH] IN BLOOD BY AUTOMATED COUNT: 14.3 % (ref 10–15)
GLUCOSE SERPL-MCNC: 116 MG/DL (ref 70–99)
HCT VFR BLD AUTO: 34.9 % (ref 40–53)
HGB BLD-MCNC: 11 G/DL (ref 13.3–17.7)
HOLD SPECIMEN: NORMAL
IMM GRANULOCYTES # BLD: 0 10E3/UL
IMM GRANULOCYTES NFR BLD: 1 %
LYMPHOCYTES # BLD AUTO: 1.8 10E3/UL (ref 0–5.3)
LYMPHOCYTES NFR BLD AUTO: 20 %
MCH RBC QN AUTO: 32.8 PG (ref 26.5–33)
MCHC RBC AUTO-ENTMCNC: 31.5 G/DL (ref 31.5–36.5)
MCV RBC AUTO: 104 FL (ref 78–100)
MONOCYTES # BLD AUTO: 0.9 10E3/UL (ref 0–1.3)
MONOCYTES NFR BLD AUTO: 11 %
NEUTROPHILS # BLD AUTO: 5.9 10E3/UL (ref 1.6–8.3)
NEUTROPHILS NFR BLD AUTO: 67 %
NRBC # BLD AUTO: 0 10E3/UL
NRBC BLD AUTO-RTO: 0 /100
PLATELET # BLD AUTO: 182 10E3/UL (ref 150–450)
POTASSIUM SERPL-SCNC: 4.5 MMOL/L (ref 3.4–5.3)
RBC # BLD AUTO: 3.35 10E6/UL (ref 4.4–5.9)
SODIUM SERPL-SCNC: 142 MMOL/L (ref 135–145)
URATE SERPL-MCNC: 5.8 MG/DL (ref 3.4–7)
WBC # BLD AUTO: 8.8 10E3/UL (ref 4–11)

## 2024-03-13 PROCEDURE — G0463 HOSPITAL OUTPT CLINIC VISIT: HCPCS

## 2024-03-13 PROCEDURE — 93798 PHYS/QHP OP CAR RHAB W/ECG: CPT

## 2024-03-13 PROCEDURE — 84550 ASSAY OF BLOOD/URIC ACID: CPT | Mod: ZL

## 2024-03-13 PROCEDURE — 85025 COMPLETE CBC W/AUTO DIFF WBC: CPT | Mod: ZL

## 2024-03-13 PROCEDURE — 73630 X-RAY EXAM OF FOOT: CPT | Mod: RT

## 2024-03-13 PROCEDURE — 36415 COLL VENOUS BLD VENIPUNCTURE: CPT | Mod: ZL

## 2024-03-13 PROCEDURE — 80048 BASIC METABOLIC PNL TOTAL CA: CPT | Mod: ZL

## 2024-03-13 PROCEDURE — 99214 OFFICE O/P EST MOD 30 MIN: CPT | Mod: 24

## 2024-03-13 RX ORDER — CEPHALEXIN 500 MG/1
500 CAPSULE ORAL 4 TIMES DAILY
Qty: 28 CAPSULE | Refills: 0 | Status: SHIPPED | OUTPATIENT
Start: 2024-03-13 | End: 2024-03-21

## 2024-03-13 ASSESSMENT — PAIN SCALES - GENERAL: PAINLEVEL: EXTREME PAIN (8)
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
## 2024-03-13 NOTE — NURSING NOTE
"Chief Complaint   Patient presents with    Musculoskeletal Problem     Right foot - pain 8/10  Tx with antifungal     Patient tx with tylenol  Hx of gout - patient feels like it might be something different.    Initial /62 (BP Location: Right arm, Patient Position: Sitting, Cuff Size: Adult Regular)   Pulse 80   Temp 97.7  F (36.5  C) (Temporal)   Resp 20   Ht 1.727 m (5' 8\")   Wt 75.6 kg (166 lb 9.6 oz)   SpO2 98%   BMI 25.33 kg/m   Estimated body mass index is 25.33 kg/m  as calculated from the following:    Height as of this encounter: 1.727 m (5' 8\").    Weight as of this encounter: 75.6 kg (166 lb 9.6 oz).     Advance Care Directive on file? yes      FOOD SECURITY SCREENING QUESTIONS:    The next two questions are to help us understand your food security.  If you are feeling you need any assistance in this area, we have resources available to support you today.    Hunger Vital Signs:  Within the past 12 months we worried whether our food would run out before we got money to buy more. Never  Within the past 12 months the food we bought just didn't last and we didn't have money to get more. Never  Imelda Abdul LPN,LPN on 3/13/2024 at 9:57 AM      Imelda Abdul LPN     "

## 2024-03-15 ENCOUNTER — TELEPHONE (OUTPATIENT)
Dept: VASCULAR SURGERY | Facility: CLINIC | Age: 69
End: 2024-03-15
Payer: MEDICARE

## 2024-03-15 ENCOUNTER — MYC MEDICAL ADVICE (OUTPATIENT)
Dept: PODIATRY | Facility: CLINIC | Age: 69
End: 2024-03-15
Payer: MEDICARE

## 2024-03-15 NOTE — TELEPHONE ENCOUNTER
"[id number] ([facility]) of [place] sent Rx request for the following:      Requested Prescriptions   Pending Prescriptions Disp Refills    blood glucose (NO BRAND SPECIFIED) test strip [Pharmacy Med Name: CONTOUR NEXT TEST STRIP] 300 strip 11     Sig: USE TO TEST BLOOD SUGAR 3 TIMES DAILY. DIAG CODE: E11.9       Diabetic Supplies Protocol Passed - 3/15/2024  7:52 AM        Passed - Medication is active on med list        Passed - Medication indicated for associated diagnosis        Passed - Patient is 18 years of age or older        Passed - Recent (6 mo) or future (30 days) visit within the authorizing provider's specialty     Patient had office visit in the last 6 months or has a visit in the next 30 days with authorizing provider.  See \"Patient Info\" tab in inbasket, or \"Choose Columns\" in Meds & Orders section of the refill encounter.                 Last Prescription Date:   1/30/23  Last Fill Qty/Refills:         300, R-11    Last Office Visit:              6/28/23   Future Office visit:           3/21/24    Prescription approved per [facility] Refill Protocol.     Monie Sebastian RN on 3/15/2024 at 7:55 AM      "

## 2024-03-19 RX ORDER — LISINOPRIL 40 MG/1
TABLET ORAL
Qty: 90 TABLET | Refills: 0 | Status: SHIPPED | OUTPATIENT
Start: 2024-03-19 | End: 2024-07-12

## 2024-03-19 NOTE — TELEPHONE ENCOUNTER
Last Prescription Date:   Last Qty/Refills:  /   Last Office Visit: 6/28/2023  Future Office Visit: 321/2024     Requested Prescriptions   Pending Prescriptions Disp Refills    lisinopril (ZESTRIL) 40 MG tablet [Pharmacy Med Name: LISINOPRIL 40MG TABLET] 90 tablet 0     Sig: TAKE 1 TABLET (40 MG) BY MOUTH DAILY IN THE MORNING       ACE Inhibitors (Including Combos) Protocol Failed - 3/19/2024  2:29 PM        Failed - Medication indicated for associated diagnosis     Medication is associated with one or more of the following diagnoses:     Chronic Kidney Disease (CKD)   Coronary Artery Disease (CAD)   Diabetes   Heart Failure (HF)   Hypertension (HTN)   Nephropathy         Routing refill request to provider for review/approval because:  Medication is reported/historical  Medication not indicated for associated diagnosis    Cordelia Clifton RN on 3/19/2024 at 2:35 PM

## 2024-03-21 ENCOUNTER — DOCUMENTATION ONLY (OUTPATIENT)
Dept: GASTROENTEROLOGY | Facility: CLINIC | Age: 69
End: 2024-03-21

## 2024-03-21 ENCOUNTER — HOSPITAL ENCOUNTER (EMERGENCY)
Facility: OTHER | Age: 69
Discharge: ANOTHER HOSPITAL | End: 2024-03-21
Attending: STUDENT IN AN ORGANIZED HEALTH CARE EDUCATION/TRAINING PROGRAM | Admitting: STUDENT IN AN ORGANIZED HEALTH CARE EDUCATION/TRAINING PROGRAM
Payer: MEDICARE

## 2024-03-21 ENCOUNTER — TELEPHONE (OUTPATIENT)
Dept: FAMILY MEDICINE | Facility: OTHER | Age: 69
End: 2024-03-21

## 2024-03-21 ENCOUNTER — NURSE TRIAGE (OUTPATIENT)
Dept: FAMILY MEDICINE | Facility: OTHER | Age: 69
End: 2024-03-21
Payer: MEDICARE

## 2024-03-21 ENCOUNTER — HOSPITAL ENCOUNTER (OUTPATIENT)
Facility: CLINIC | Age: 69
End: 2024-03-21
Admitting: INTERNAL MEDICINE
Payer: MEDICARE

## 2024-03-21 VITALS
HEART RATE: 85 BPM | WEIGHT: 161 LBS | TEMPERATURE: 97.3 F | BODY MASS INDEX: 24.4 KG/M2 | SYSTOLIC BLOOD PRESSURE: 128 MMHG | OXYGEN SATURATION: 96 % | RESPIRATION RATE: 20 BRPM | DIASTOLIC BLOOD PRESSURE: 73 MMHG | HEIGHT: 68 IN

## 2024-03-21 DIAGNOSIS — K92.2 ACUTE LOWER GI BLEEDING: ICD-10-CM

## 2024-03-21 LAB
ABO/RH(D): NORMAL
ANION GAP SERPL CALCULATED.3IONS-SCNC: 10 MMOL/L (ref 7–15)
ANTIBODY SCREEN: NEGATIVE
BASOPHILS # BLD MANUAL: 0 10E3/UL (ref 0–0.2)
BASOPHILS NFR BLD MANUAL: 0 %
BUN SERPL-MCNC: 50 MG/DL (ref 8–23)
CALCIUM SERPL-MCNC: 8.7 MG/DL (ref 8.8–10.2)
CHLORIDE SERPL-SCNC: 111 MMOL/L (ref 98–107)
CREAT SERPL-MCNC: 1.04 MG/DL (ref 0.67–1.17)
DEPRECATED HCO3 PLAS-SCNC: 24 MMOL/L (ref 22–29)
EGFRCR SERPLBLD CKD-EPI 2021: 77 ML/MIN/1.73M2
EOSINOPHIL # BLD MANUAL: 0 10E3/UL (ref 0–0.7)
EOSINOPHIL NFR BLD MANUAL: 0 %
ERYTHROCYTE [DISTWIDTH] IN BLOOD BY AUTOMATED COUNT: 14.6 % (ref 10–15)
GLUCOSE SERPL-MCNC: 127 MG/DL (ref 70–99)
HCT VFR BLD AUTO: 29 % (ref 40–53)
HGB BLD-MCNC: 8.6 G/DL (ref 13.3–17.7)
HGB BLD-MCNC: 8.9 G/DL (ref 13.3–17.7)
HOLD SPECIMEN: NORMAL
INR PPP: 1.03 (ref 0.85–1.15)
LYMPHOCYTES # BLD MANUAL: 7.6 10E3/UL (ref 0.8–5.3)
LYMPHOCYTES NFR BLD MANUAL: 56 %
MCH RBC QN AUTO: 32.8 PG (ref 26.5–33)
MCHC RBC AUTO-ENTMCNC: 30.7 G/DL (ref 31.5–36.5)
MCV RBC AUTO: 107 FL (ref 78–100)
MONOCYTES # BLD MANUAL: 0.9 10E3/UL (ref 0–1.3)
MONOCYTES NFR BLD MANUAL: 7 %
NEUTROPHILS # BLD MANUAL: 5 10E3/UL (ref 1.6–8.3)
NEUTROPHILS NFR BLD MANUAL: 37 %
NRBC # BLD AUTO: 0 10E3/UL
NRBC BLD AUTO-RTO: 0 /100
PLAT MORPH BLD: ABNORMAL
PLATELET # BLD AUTO: 182 10E3/UL (ref 150–450)
POTASSIUM SERPL-SCNC: 4.6 MMOL/L (ref 3.4–5.3)
RBC # BLD AUTO: 2.71 10E6/UL (ref 4.4–5.9)
RBC MORPH BLD: ABNORMAL
SODIUM SERPL-SCNC: 145 MMOL/L (ref 135–145)
SPECIMEN EXPIRATION DATE: NORMAL
VARIANT LYMPHS BLD QL SMEAR: PRESENT
WBC # BLD AUTO: 13.5 10E3/UL (ref 4–11)

## 2024-03-21 PROCEDURE — 86900 BLOOD TYPING SEROLOGIC ABO: CPT | Performed by: STUDENT IN AN ORGANIZED HEALTH CARE EDUCATION/TRAINING PROGRAM

## 2024-03-21 PROCEDURE — 99285 EMERGENCY DEPT VISIT HI MDM: CPT | Mod: 25 | Performed by: STUDENT IN AN ORGANIZED HEALTH CARE EDUCATION/TRAINING PROGRAM

## 2024-03-21 PROCEDURE — 96374 THER/PROPH/DIAG INJ IV PUSH: CPT | Performed by: STUDENT IN AN ORGANIZED HEALTH CARE EDUCATION/TRAINING PROGRAM

## 2024-03-21 PROCEDURE — 99285 EMERGENCY DEPT VISIT HI MDM: CPT | Mod: 25

## 2024-03-21 PROCEDURE — 36415 COLL VENOUS BLD VENIPUNCTURE: CPT | Performed by: STUDENT IN AN ORGANIZED HEALTH CARE EDUCATION/TRAINING PROGRAM

## 2024-03-21 PROCEDURE — 85610 PROTHROMBIN TIME: CPT | Performed by: INTERNAL MEDICINE

## 2024-03-21 PROCEDURE — 250N000013 HC RX MED GY IP 250 OP 250 PS 637: Performed by: INTERNAL MEDICINE

## 2024-03-21 PROCEDURE — C9113 INJ PANTOPRAZOLE SODIUM, VIA: HCPCS | Performed by: STUDENT IN AN ORGANIZED HEALTH CARE EDUCATION/TRAINING PROGRAM

## 2024-03-21 PROCEDURE — 120N000001 HC R&B MED SURG/OB

## 2024-03-21 PROCEDURE — 99285 EMERGENCY DEPT VISIT HI MDM: CPT | Performed by: STUDENT IN AN ORGANIZED HEALTH CARE EDUCATION/TRAINING PROGRAM

## 2024-03-21 PROCEDURE — 85018 HEMOGLOBIN: CPT | Performed by: STUDENT IN AN ORGANIZED HEALTH CARE EDUCATION/TRAINING PROGRAM

## 2024-03-21 PROCEDURE — 250N000011 HC RX IP 250 OP 636: Performed by: STUDENT IN AN ORGANIZED HEALTH CARE EDUCATION/TRAINING PROGRAM

## 2024-03-21 PROCEDURE — 80048 BASIC METABOLIC PNL TOTAL CA: CPT | Performed by: STUDENT IN AN ORGANIZED HEALTH CARE EDUCATION/TRAINING PROGRAM

## 2024-03-21 PROCEDURE — 99207 PR NO BILLABLE SERVICE THIS VISIT: CPT | Performed by: INTERNAL MEDICINE

## 2024-03-21 PROCEDURE — 85007 BL SMEAR W/DIFF WBC COUNT: CPT | Performed by: STUDENT IN AN ORGANIZED HEALTH CARE EDUCATION/TRAINING PROGRAM

## 2024-03-21 PROCEDURE — 85025 COMPLETE CBC W/AUTO DIFF WBC: CPT | Performed by: STUDENT IN AN ORGANIZED HEALTH CARE EDUCATION/TRAINING PROGRAM

## 2024-03-21 PROCEDURE — 250N000012 HC RX MED GY IP 250 OP 636 PS 637: Performed by: INTERNAL MEDICINE

## 2024-03-21 RX ORDER — SODIUM CHLORIDE, SODIUM LACTATE, POTASSIUM CHLORIDE, CALCIUM CHLORIDE 600; 310; 30; 20 MG/100ML; MG/100ML; MG/100ML; MG/100ML
INJECTION, SOLUTION INTRAVENOUS CONTINUOUS
Status: CANCELLED | OUTPATIENT
Start: 2024-03-21

## 2024-03-21 RX ORDER — DAPSONE 25 MG/1
50 TABLET ORAL DAILY
Status: DISCONTINUED | OUTPATIENT
Start: 2024-03-22 | End: 2024-03-21 | Stop reason: HOSPADM

## 2024-03-21 RX ORDER — LIDOCAINE 40 MG/G
CREAM TOPICAL
Status: CANCELLED | OUTPATIENT
Start: 2024-03-21

## 2024-03-21 RX ORDER — ROSUVASTATIN CALCIUM 20 MG/1
20 TABLET, COATED ORAL
Status: DISCONTINUED | OUTPATIENT
Start: 2024-03-22 | End: 2024-03-21 | Stop reason: HOSPADM

## 2024-03-21 RX ORDER — LISINOPRIL 40 MG/1
40 TABLET ORAL DAILY
Status: DISCONTINUED | OUTPATIENT
Start: 2024-03-22 | End: 2024-03-21 | Stop reason: HOSPADM

## 2024-03-21 RX ORDER — ALBUTEROL SULFATE 5 MG/ML
2.5 SOLUTION RESPIRATORY (INHALATION) EVERY 6 HOURS PRN
Status: DISCONTINUED | OUTPATIENT
Start: 2024-03-21 | End: 2024-03-21 | Stop reason: HOSPADM

## 2024-03-21 RX ORDER — AZITHROMYCIN 250 MG/1
250 TABLET, FILM COATED ORAL
Status: DISCONTINUED | OUTPATIENT
Start: 2024-03-22 | End: 2024-03-21 | Stop reason: HOSPADM

## 2024-03-21 RX ORDER — MULTIVITAMIN,THERAPEUTIC
1 TABLET ORAL DAILY
Status: DISCONTINUED | OUTPATIENT
Start: 2024-03-21 | End: 2024-03-21

## 2024-03-21 RX ORDER — CARVEDILOL 6.25 MG/1
6.25 TABLET ORAL 2 TIMES DAILY WITH MEALS
Status: DISCONTINUED | OUTPATIENT
Start: 2024-03-21 | End: 2024-03-21 | Stop reason: HOSPADM

## 2024-03-21 RX ORDER — AMLODIPINE BESYLATE 5 MG/1
10 TABLET ORAL DAILY
Status: DISCONTINUED | OUTPATIENT
Start: 2024-03-22 | End: 2024-03-21 | Stop reason: HOSPADM

## 2024-03-21 RX ORDER — TACROLIMUS 0.5 MG/1
2 CAPSULE ORAL 2 TIMES DAILY
Status: DISCONTINUED | OUTPATIENT
Start: 2024-03-21 | End: 2024-03-21 | Stop reason: HOSPADM

## 2024-03-21 RX ORDER — NICOTINE POLACRILEX 4 MG
15-30 LOZENGE BUCCAL
Status: CANCELLED | OUTPATIENT
Start: 2024-03-21

## 2024-03-21 RX ORDER — AZATHIOPRINE 50 MG/1
50 TABLET ORAL EVERY EVENING
Status: DISCONTINUED | OUTPATIENT
Start: 2024-03-21 | End: 2024-03-21 | Stop reason: HOSPADM

## 2024-03-21 RX ORDER — ONDANSETRON 2 MG/ML
4 INJECTION INTRAMUSCULAR; INTRAVENOUS EVERY 6 HOURS PRN
Status: CANCELLED | OUTPATIENT
Start: 2024-03-21

## 2024-03-21 RX ORDER — AMOXICILLIN 250 MG
2 CAPSULE ORAL 2 TIMES DAILY PRN
Status: CANCELLED | OUTPATIENT
Start: 2024-03-21

## 2024-03-21 RX ORDER — CALCIUM CARBONATE 500 MG/1
1000 TABLET, CHEWABLE ORAL 4 TIMES DAILY PRN
Status: CANCELLED | OUTPATIENT
Start: 2024-03-21

## 2024-03-21 RX ORDER — ACETAMINOPHEN 325 MG/1
650 TABLET ORAL EVERY 4 HOURS PRN
Status: CANCELLED | OUTPATIENT
Start: 2024-03-21

## 2024-03-21 RX ORDER — MONTELUKAST SODIUM 5 MG/1
10 TABLET, CHEWABLE ORAL EVERY EVENING
Status: DISCONTINUED | OUTPATIENT
Start: 2024-03-21 | End: 2024-03-21 | Stop reason: HOSPADM

## 2024-03-21 RX ORDER — ONDANSETRON 4 MG/1
4 TABLET, ORALLY DISINTEGRATING ORAL EVERY 6 HOURS PRN
Status: CANCELLED | OUTPATIENT
Start: 2024-03-21

## 2024-03-21 RX ORDER — FLUTICASONE FUROATE AND VILANTEROL 100; 25 UG/1; UG/1
1 POWDER RESPIRATORY (INHALATION) DAILY
Status: DISCONTINUED | OUTPATIENT
Start: 2024-03-22 | End: 2024-03-21 | Stop reason: HOSPADM

## 2024-03-21 RX ORDER — AMOXICILLIN 250 MG
1 CAPSULE ORAL 2 TIMES DAILY PRN
Status: CANCELLED | OUTPATIENT
Start: 2024-03-21

## 2024-03-21 RX ORDER — TACROLIMUS 0.5 MG/1
0.5 CAPSULE ORAL EVERY EVENING
Status: DISCONTINUED | OUTPATIENT
Start: 2024-03-21 | End: 2024-03-21 | Stop reason: HOSPADM

## 2024-03-21 RX ORDER — ALBUTEROL SULFATE 90 UG/1
1-2 AEROSOL, METERED RESPIRATORY (INHALATION) EVERY 6 HOURS PRN
Status: DISCONTINUED | OUTPATIENT
Start: 2024-03-21 | End: 2024-03-21 | Stop reason: ALTCHOICE

## 2024-03-21 RX ORDER — ACETAMINOPHEN 650 MG/1
650 SUPPOSITORY RECTAL EVERY 4 HOURS PRN
Status: CANCELLED | OUTPATIENT
Start: 2024-03-21

## 2024-03-21 RX ORDER — FLUDROCORTISONE ACETATE 0.1 MG/1
0.1 TABLET ORAL DAILY
Status: DISCONTINUED | OUTPATIENT
Start: 2024-03-22 | End: 2024-03-21 | Stop reason: HOSPADM

## 2024-03-21 RX ORDER — DEXTROSE MONOHYDRATE 25 G/50ML
25-50 INJECTION, SOLUTION INTRAVENOUS
Status: CANCELLED | OUTPATIENT
Start: 2024-03-21

## 2024-03-21 RX ADMIN — TACROLIMUS 2 MG: 0.5 CAPSULE ORAL at 19:52

## 2024-03-21 RX ADMIN — CARVEDILOL 6.25 MG: 6.25 TABLET, FILM COATED ORAL at 19:53

## 2024-03-21 RX ADMIN — TACROLIMUS 0.5 MG: 0.5 CAPSULE ORAL at 19:53

## 2024-03-21 RX ADMIN — AZATHIOPRINE 50 MG: 50 TABLET ORAL at 19:53

## 2024-03-21 RX ADMIN — MONTELUKAST SODIUM 10 MG: 5 TABLET, CHEWABLE ORAL at 19:53

## 2024-03-21 RX ADMIN — PANTOPRAZOLE SODIUM 40 MG: 40 INJECTION, POWDER, FOR SOLUTION INTRAVENOUS at 19:59

## 2024-03-21 ASSESSMENT — ACTIVITIES OF DAILY LIVING (ADL)
ADLS_ACUITY_SCORE: 35
ADLS_ACUITY_SCORE: 33

## 2024-03-21 ASSESSMENT — COLUMBIA-SUICIDE SEVERITY RATING SCALE - C-SSRS
1. IN THE PAST MONTH, HAVE YOU WISHED YOU WERE DEAD OR WISHED YOU COULD GO TO SLEEP AND NOT WAKE UP?: NO
6. HAVE YOU EVER DONE ANYTHING, STARTED TO DO ANYTHING, OR PREPARED TO DO ANYTHING TO END YOUR LIFE?: NO
2. HAVE YOU ACTUALLY HAD ANY THOUGHTS OF KILLING YOURSELF IN THE PAST MONTH?: NO

## 2024-03-21 NOTE — PROGRESS NOTES
Paged regarding blood per rectum in the setting of plavix. Recommend evaluation for possible colonoscopy. If medically able, consider holding plavix in the interim.     Andrews Bowles DO   of Medicine  Director, Esophageal Disorders Program  Division of Gastroenterology, Hepatology, and Nutrition  [facility]

## 2024-03-21 NOTE — TELEPHONE ENCOUNTER
"Wife Kyung advised to call 911, she states she will do so. ER Nurse Station notified. Yoanna Jesus RN on 3/21/2024 at 8:39 AM      Reason for Disposition   Systolic BP < 90 and feeling dizzy, lightheaded, or weak    Answer Assessment - Initial Assessment Questions  1. BLOOD PRESSURE: \"What is your blood pressure?\" \"Did you take at least two measurements 5 minutes apart?\"      83/46    2. ONSET: \"When did you take your blood pressure?\"      0830    3. HOW: \"How did you take your blood pressure?\" (e.g., visiting nurse, automatic home BP monitor)      Home cuff    4. HISTORY: \"Do you have a history of low blood pressure?\" \"What is your blood pressure normally?\"    143/77 in AMs 120's/60-70 in evenings  118/62 at last office visit     5. MEDICINES: \"Are you taking any medicines for blood pressure?\" If Yes, ask: \"Have they been changed recently?\"      Yes took them this morning     6. PULSE RATE: \"Do you know what your pulse rate is?\"       117    7. OTHER SYMPTOMS: \"Have you been sick recently?\" \"Have you had a recent injury?\"      Denies recent illness or injury, patient is lightheaded and sweating    8. PREGNANCY: \"Is there any chance you are pregnant?\" \"When was your last menstrual period?\"      no    Protocols used: Blood Pressure - Low-A-OH    "

## 2024-03-21 NOTE — PROGRESS NOTES
Transfer Type: [facility]  Transfer Triage Note    Date of call: 03/21/24  Time of call: 1:03 PM    Current Patient Location:  [facility]  Current Level of Care: ED    Vitals: see below  Diagnosis: Lower GI bleed  Reason for requested transfer: Procedure can be done here and not at referring hospital   Isolation Needs: None    Care everywhere has been updated and reviewed: Yes  Necessary images have been sent through PACS: Yes    If patient is transferring for specialty care or specific procedure, the specialist required has participated in the transfer call and agreed with need for transfer and anticipated timeline: Yes, Provider name: Dr. Bowles specialty with: GI luminal service    This patient is a 68 y.o. man with h/o DM2, leg ulcers, prior PE, GERD, prior lung transplant who presented with bright red blood per rectum. He is on plavix due to sequential stenting (unclear if unable to stop this due to very recent stenting. Had 2 episodes this morning with dark red/bloody stool. HR 90s-110s. Hgb was 11.0 previously (8 days ago) and now is 8.9. The provider there (surgeon) won't do colonoscopy due to not having platelets in stock. Dr. Bowles and myself discussed with ED doc that while we accept the transfer, there is a large waitlist. What would be in the patient's best interest is to admit locally (despite not having platelets) and if transfusion is needed, PRBC's would be fine to transfuse. Alternatively, if able to stablize further, he could do colonoscopy as outpatient. If worsening/deteriorating we discussed that we would love a call back to see if more expedited placement could be found.   ED doc agreed.     Transfer accepted: Yes  Stability of Patient: Patient is vitally stable, with no critical labs, and will likely remain stable throughout the transfer process  Is the patient appropriate for [facility]? No, What specific [place] needs are anticipated? colonoscopy  Level of Care Needed: Med  Surg  Telemetry Needed:  None  Expected Time of Arrival for Transfer: greater than 24 hours  Arrival Location:  [facility]     Recommendations for Management and Stabilization: Given      Miri Means MD

## 2024-03-21 NOTE — ED TRIAGE NOTES
"Patient presents by EMS with having two episodes of bloody stools this morning.  Patient denies pain at this time.  Patient is on Plavix.  MD in room on patient arrival for EMS report.    BP (!) 140/81   Pulse 90   Resp 20   Ht 1.727 m (5' 8\")   Wt 73 kg (161 lb)   SpO2 97%   BMI 24.48 kg/m         Triage Assessment (Adult)       Row Name 03/21/24 0942          Triage Assessment    Airway WDL WDL        Respiratory WDL    Respiratory WDL WDL        Skin Circulation/Temperature WDL    Skin Circulation/Temperature WDL WDL        Cardiac WDL    Cardiac WDL WDL        Peripheral/Neurovascular WDL    Peripheral Neurovascular WDL WDL        Cognitive/Neuro/Behavioral WDL    Cognitive/Neuro/Behavioral WDL WDL                     "
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
## 2024-03-22 ENCOUNTER — TRANSFERRED RECORDS (OUTPATIENT)
Dept: HEALTH INFORMATION MANAGEMENT | Facility: CLINIC | Age: 69
End: 2024-03-22

## 2024-03-22 ENCOUNTER — TELEPHONE (OUTPATIENT)
Dept: TRANSPLANT | Facility: CLINIC | Age: 69
End: 2024-03-22

## 2024-03-22 NOTE — PHARMACY-ADMISSION MEDICATION HISTORY
Pharmacist Admission Medication History    Admission medication history is complete. The information provided in this note is only as accurate as the sources available at the time of the update.    Information Source(s): Patient, Hospital records, and CareEverywhere/SureScripts via in-person    Pertinent Information: Patient medication list was consistent with past medication list; some medication are being are being picked up form the [facility]. Verified Tacrolimus Generic equivalent per medication history.      Changes made to PTA medication list:  Added: None  Deleted: None  Changed: None    Allergies reviewed with patient and updates made in EHR: yes    Medication History Completed By: Elfego Rodriguez [facility] 3/21/2024 7:41 PM    PTA Med List   Medication Sig Last Dose    acetaminophen (TYLENOL) 325 MG tablet Take 2 tablets (650 mg) by mouth every 6 hours as needed for mild pain Past Week    albuterol (PROAIR HFA/PROVENTIL HFA/VENTOLIN HFA) 108 (90 Base) MCG/ACT inhaler Inhale 1-2 puffs into the lungs every 6 hours as needed for shortness of breath or wheezing Past Week    amLODIPine (NORVASC) 10 MG tablet Take 1 tablet (10 mg) by mouth daily 3/21/2024 at AM    aspirin (ASA) 81 MG EC tablet Take 1 tablet (81 mg) by mouth daily 3/21/2024 at AM    azaTHIOprine (IMURAN) 50 MG tablet Take 1 tablet (50 mg) by mouth daily 3/20/2024 at Pm    azithromycin (ZITHROMAX) 250 MG tablet Take 250 mg by mouth Every Mon, Wed, Fri Morning 3/20/2024 at AM    Calcium Carbonate-Vitamin D 600-10 MG-MCG TABS Take 1 tablet by mouth 2 times daily (with meals) 3/21/2024 at AM    carvedilol (COREG) 6.25 MG tablet TAKE 1 TABLET (6.25 MG) BY MOUTH 2 TIMES DAILY (WITH MEALS) 3/21/2024 at AM    clopidogrel (PLAVIX) 75 MG tablet Take 1 tablet (75 mg) by mouth daily 3/21/2024 at AM    dapsone (ACZONE) 25 MG tablet Take 2 tablets (50 mg) by mouth daily 3/21/2024    econazole nitrate 1 % external cream APPLY TOPICALLY DAILY TO  FEET AND HEELS. Past Week    fludrocortisone (FLORINEF) 0.1 MG tablet Take 1 tablet (0.1 mg) by mouth daily 3/21/2024 at AM    fluticasone-salmeterol (ADVAIR-HFA) 230-21 MCG/ACT inhaler Inhale 2 puffs into the lungs 2 times daily 3/21/2024 at AM    furosemide (LASIX) 20 MG tablet Take 1 tablet (20 mg) by mouth daily 3/21/2024 at Am    insulin aspart (NOVOLOG PEN) 100 UNIT/ML pen Take 5 U am, 3 unit(s) non, 5 unit(s) pm of insulin within 30 minutes of start of breakfast, lunch, and dinner. Do not give if blood sugar is less than 70 mg/dl. 3/21/2024 at AM    insulin glargine (LANTUS PEN) 100 UNIT/ML pen Inject 18 Units Subcutaneous every morning (before breakfast) 3/21/2024 at AM    lisinopril (ZESTRIL) 40 MG tablet TAKE 1 TABLET (40 MG) BY MOUTH DAILY IN THE MORNING 3/21/2024 at AM    loperamide (IMODIUM) 2 MG capsule Take 1 capsule (2 mg) by mouth 4 times daily as needed for diarrhea 3/20/2024 at AM    magnesium oxide (MAG-OX) 400 MG tablet Take 1 tablet (400 mg) by mouth 2 times daily 3/21/2024 at AM    montelukast (SINGULAIR) 10 MG tablet Take 1 tablet (10 mg) by mouth every evening 3/20/2024 at PM    multivitamin, therapeutic (THERA-VIT) TABS Take 1 tablet by mouth daily 3/21/2024 at AM    omeprazole (PRILOSEC) 20 MG DR capsule Take 1 capsule (20 mg) by mouth 2 times daily (before meals) 3/21/2024 at AM    predniSONE (DELTASONE) 5 MG tablet TAKE ONE TABLET BY MOUTH ONCE DAILY IN THE MORNING AND ONE-HALF TABLET IN THE EVENING 3/21/2024 at AM    rosuvastatin (CRESTOR) 40 MG tablet Take 20 mg by mouth Every Mon, Wed, Fri Morning 3/20/2024 at AM    sildenafil (VIAGRA) 25 MG tablet Take 1 tablet (25 mg) by mouth as needed (as needed) Unknown    tacrolimus (GENERIC) 0.5 MG capsule Take 1 capsule (0.5 mg) by mouth every evening Total dose: 2 mg in the AM and 2.5 mg in the PM 3/20/2024 at PM    tacrolimus (GENERIC) 1 MG capsule Take 2 capsules (2 mg) by mouth 2 times daily Total dose: 2 mg in AM and 2.5 mg in PM  3/21/2024 at AM

## 2024-03-22 NOTE — TELEPHONE ENCOUNTER
Pt transferred from [facility] for rectal bleeding to [facility] in [place] for likely colonoscopy.     Spoke with [facility] charge RN. Gave contact numbers for transplant physician on-call and RNCC should questions arise.

## 2024-03-27 ENCOUNTER — TELEPHONE (OUTPATIENT)
Dept: TRANSPLANT | Facility: CLINIC | Age: 69
End: 2024-03-27
Payer: MEDICARE

## 2024-03-27 NOTE — TELEPHONE ENCOUNTER
Called patient to touch base after recent hospitalization.     From note in Care Everywhere  admitted to [facility] on 3/21/2024 for treatment of melena and acute blood loss anemia with a hemoglobin of 8.2 down from 11. Gastroenterology was consulted and recommended EGD. EGD revealed a 5 cm segment of ulcerated and friable mucosa in the second segment of the duodenum. Case was discussed with Cardiology who recommended to continue DAPT with aspirin and Plavix given recent stenting (2/16/24). This should ideally not be interrupted for 12 months unless there is life threatening bleeding. Hemoglobin remained stable following the procedure and the patient was asymptomatic. He was discharged home on 3/23/24 with return precautions and instructions to follow up with his PCP at [place]. If bleeding recurs, GI recommended consideration of IR intervention.     Labs tomorrow morning. Pt reports feeling good since discharge. No signs/symptoms of bleeding.

## 2024-03-27 NOTE — LETTER
PHYSICIAN ORDERS      DATE & TIME ISSUED: 2024 1:53 PM  PATIENT NAME: Aubrey Duncan   : 1955     [facility] MR# [if applicable]: [id number]     DIAGNOSIS:  Lung Transplant  Z94.2  Orders for cbc with platelets, bmp, mag, CMQuantitative PCR (blood)  Quantitative PCR (blood), and Tacrolimus level to be drawn 3/28/24      Any questions please call: [place] [phone]    Please fax these results to [phone].         Alma Murphy MD  Pulmonary Disease

## 2024-03-28 ENCOUNTER — TELEPHONE (OUTPATIENT)
Dept: TRANSPLANT | Facility: CLINIC | Age: 69
End: 2024-03-28

## 2024-03-28 ENCOUNTER — MYC MEDICAL ADVICE (OUTPATIENT)
Dept: TRANSPLANT | Facility: CLINIC | Age: 69
End: 2024-03-28

## 2024-03-28 ENCOUNTER — OFFICE VISIT (OUTPATIENT)
Dept: FAMILY MEDICINE | Facility: OTHER | Age: 69
End: 2024-03-28
Attending: NURSE PRACTITIONER
Payer: MEDICARE

## 2024-03-28 VITALS
HEART RATE: 112 BPM | OXYGEN SATURATION: 98 % | RESPIRATION RATE: 20 BRPM | BODY MASS INDEX: 24.33 KG/M2 | WEIGHT: 160 LBS | TEMPERATURE: 97.8 F | SYSTOLIC BLOOD PRESSURE: 122 MMHG | DIASTOLIC BLOOD PRESSURE: 62 MMHG

## 2024-03-28 DIAGNOSIS — S91.109D OPEN WOUND OF TOE, SUBSEQUENT ENCOUNTER: ICD-10-CM

## 2024-03-28 DIAGNOSIS — K92.2 GASTROINTESTINAL HEMORRHAGE, UNSPECIFIED GASTROINTESTINAL HEMORRHAGE TYPE: ICD-10-CM

## 2024-03-28 DIAGNOSIS — I73.9 PAD (PERIPHERAL ARTERY DISEASE) (H): ICD-10-CM

## 2024-03-28 DIAGNOSIS — I25.10 ATHEROSCLEROSIS OF CORONARY ARTERY OF NATIVE HEART WITHOUT ANGINA PECTORIS, UNSPECIFIED VESSEL OR LESION TYPE: ICD-10-CM

## 2024-03-28 DIAGNOSIS — Z95.5 S/P CORONARY ARTERY STENT PLACEMENT: ICD-10-CM

## 2024-03-28 DIAGNOSIS — Z94.2 LUNG REPLACED BY TRANSPLANT (H): ICD-10-CM

## 2024-03-28 DIAGNOSIS — E11.51 DIABETES MELLITUS WITH PERIPHERAL VASCULAR DISEASE (H): Primary | ICD-10-CM

## 2024-03-28 LAB
ANION GAP SERPL CALCULATED.3IONS-SCNC: 15 MMOL/L (ref 7–15)
BUN SERPL-MCNC: 35.5 MG/DL (ref 8–23)
CALCIUM SERPL-MCNC: 8.9 MG/DL (ref 8.8–10.2)
CHLORIDE SERPL-SCNC: 106 MMOL/L (ref 98–107)
CREAT SERPL-MCNC: 1.41 MG/DL (ref 0.67–1.17)
DEPRECATED HCO3 PLAS-SCNC: 21 MMOL/L (ref 22–29)
EGFRCR SERPLBLD CKD-EPI 2021: 54 ML/MIN/1.73M2
ERYTHROCYTE [DISTWIDTH] IN BLOOD BY AUTOMATED COUNT: 15.5 % (ref 10–15)
GLUCOSE SERPL-MCNC: 156 MG/DL (ref 70–99)
HCT VFR BLD AUTO: 32.4 % (ref 40–53)
HGB BLD-MCNC: 10 G/DL (ref 13.3–17.7)
MAGNESIUM SERPL-MCNC: 1.8 MG/DL (ref 1.7–2.3)
MCH RBC QN AUTO: 33.6 PG (ref 26.5–33)
MCHC RBC AUTO-ENTMCNC: 30.9 G/DL (ref 31.5–36.5)
MCV RBC AUTO: 109 FL (ref 78–100)
PLATELET # BLD AUTO: 288 10E3/UL (ref 150–450)
POTASSIUM SERPL-SCNC: 4.3 MMOL/L (ref 3.4–5.3)
RBC # BLD AUTO: 2.98 10E6/UL (ref 4.4–5.9)
SODIUM SERPL-SCNC: 142 MMOL/L (ref 135–145)
TACROLIMUS BLD-MCNC: 7.4 UG/L (ref 5–15)
TME LAST DOSE: NORMAL H
TME LAST DOSE: NORMAL H
WBC # BLD AUTO: 15.5 10E3/UL (ref 4–11)

## 2024-03-28 PROCEDURE — 85027 COMPLETE CBC AUTOMATED: CPT | Mod: ZL | Performed by: NURSE PRACTITIONER

## 2024-03-28 PROCEDURE — 36415 COLL VENOUS BLD VENIPUNCTURE: CPT | Mod: ZL | Performed by: NURSE PRACTITIONER

## 2024-03-28 PROCEDURE — 80197 ASSAY OF TACROLIMUS: CPT | Mod: ZL | Performed by: NURSE PRACTITIONER

## 2024-03-28 PROCEDURE — 83735 ASSAY OF MAGNESIUM: CPT | Mod: ZL | Performed by: NURSE PRACTITIONER

## 2024-03-28 PROCEDURE — 80048 BASIC METABOLIC PNL TOTAL CA: CPT | Mod: ZL | Performed by: NURSE PRACTITIONER

## 2024-03-28 PROCEDURE — G0463 HOSPITAL OUTPT CLINIC VISIT: HCPCS

## 2024-03-28 PROCEDURE — 99214 OFFICE O/P EST MOD 30 MIN: CPT | Performed by: NURSE PRACTITIONER

## 2024-03-28 ASSESSMENT — PAIN SCALES - GENERAL: PAINLEVEL: SEVERE PAIN (7)

## 2024-03-28 NOTE — LETTER
PHYSICIAN ORDERS      DATE & TIME ISSUED: 2024 9:10 AM  PATIENT NAME: Aubrey Duncan   : 1955     [facility] MR# [if applicable]: [id number]     DIAGNOSIS:  Lung Transplant  Z94.2  Please check cbc with platelets, bmp, and tacrolimus level week of 24      Any questions please call: Devante [phone]    Please fax these results to [phone].         Alma Murphy MD  Pulmonary Disease

## 2024-03-28 NOTE — LETTER
PHYSICIAN ORDERS      DATE & TIME ISSUED: 2024 11:08 AM  PATIENT NAME: Aubrey Duncan   : 1955     [facility] MR# [if applicable]: [id number]     DIAGNOSIS:  Lung Transplant  Z94.2  Order for CBC week of 24      Any questions please call: [place] [phone]    Please fax these results to [phone].         Alma Murphy MD  Pulmonary Disease

## 2024-03-28 NOTE — TELEPHONE ENCOUNTER
WBC 15.5  Pt denies any infectious symptoms   Instructed to let RNCC know if this changes.   Repeat labs next week.         [facility] lab: fax: [phone]

## 2024-03-28 NOTE — PROGRESS NOTES
Assessment & Plan   Problem List Items Addressed This Visit          Digestive    GIB (gastrointestinal bleeding)       Circulatory    Coronary atherosclerosis    PAD (peripheral artery disease) (H24)       Other    S/P coronary artery stent placement     Other Visit Diagnoses       Diabetes mellitus with peripheral vascular disease (H)    -  Primary    Relevant Orders    Miscellaneous Order for DME - ONLY FOR DME    Albumin Random Urine Quantitative with Creat Ratio    Open wound of toe, subsequent encounter        Lung replaced by transplant (H)            He is due to have blood work updated today.  Lung transplant team has labs already ordered.  I did review these, hemoglobin has improved to 10.1.  Plan to continue with dual antiplatelet therapy due to recent stenting.  He will follow-up with ABIs for peripheral artery disease symptoms on left, continue with follow-up with cardiology, lung transplant team and podiatry.  Plan to continue with current diabetes and blood pressure management as I suspect fluctuations due to recent hospital stay and illness.    Review of the result(s) of each unique test - as above     MED REC REQUIRED  Post Medication Reconciliation Status: discharge medications reconciled, continue medications without change      No follow-ups on file.      Albert Burgos is a 68 year old, presenting for the following health issues:  Hospital F/U    History of Present Illness       He eats 2-3 servings of fruits and vegetables daily.He consumes 0 sweetened beverage(s) daily.He exercises with enough effort to increase his heart rate 20 to 29 minutes per day.  He exercises with enough effort to increase his heart rate 3 or less days per week.   He is taking medications regularly.           Hospital Follow-up Visit:    Hospital[facility]Nursing Home/IP Re[facility]  Date of Admission: 03/21/24   Date of Discharge: 03/23/24  Reason(s) for Admission: GIB (gastrointestinal bleeding)      Was your hospitalization related to COVID-19? No   Problems taking medications regularly:  None  Medication changes since discharge: None  Problems adhering to non-medication therapy:  None    Summary of hospitalization:  CareEverywhere information obtained and reviewed  Diagnostic Tests/Treatments reviewed.  Follow up needed: Hgb  Other Healthcare Providers Involved in Patient s Care:          Transplant team, cardiology  Update since discharge: improved.         Plan of care communicated with patient           He comes in today for hospital follow-up.  He was seen at [facility] due to low hemoglobin, GI bleed.  He is on Plavix and aspirin due to recent stents, unable to have these discontinued.  Scoping was completed and plan to continue with omeprazole for treatment.  He does report he continues to have low energy but has not had any further bleeding.  Occasional upset stomach but nothing sharp.  Blood sugars have been running 100-100 90s, using sliding scale occasionally.  Pressures have been fluctuant between 110s to 140s over 50s to 70s.  He is requesting order for orthotics for diabetic shoes.  Continues to have wounds to his right great and fifth toe as well as left fifth toe.  No current infection or concerns.  He is wearing compression stockings and following with podiatry.  TC ordered for left leg due to increasing leg pain.    Objective    /62   Pulse 112   Temp 97.8  F (36.6  C)   Resp 20   Wt 72.6 kg (160 lb)   SpO2 98%   BMI 24.33 kg/m    Body mass index is 24.33 kg/m .  Physical Exam   GENERAL: alert and no distress  EYES: Eyes grossly normal to inspection  RESP: lungs clear to auscultation - no rales, rhonchi or wheezes  CV: regular rate and rhythm, normal S1 S2, no S3 or S4, no murmur, click or rub, no peripheral edema  ABDOMEN: soft, nontender  NEURO: Normal strength and tone, mentation intact and speech normal  PSYCH: mentation appears normal, affect normal/bright  Diabetic  foot exam: no trophic changes or ulcerative lesions, normal sensory exam, and small wounds with scabbing noted to right great toe, right fifth toe and left fifth toe.  Moderate swelling to right foot.  Toes without significant erythema, no drainage.            Signed Electronically by: BRANDI Manning CNP

## 2024-03-29 DIAGNOSIS — Z94.2 TRANSPLANTED, LUNG (H): Primary | ICD-10-CM

## 2024-03-29 PROBLEM — Z95.5 S/P CORONARY ARTERY STENT PLACEMENT: Status: ACTIVE | Noted: 2024-03-29

## 2024-03-29 PROBLEM — I73.9 PAD (PERIPHERAL ARTERY DISEASE) (H): Status: ACTIVE | Noted: 2024-03-29

## 2024-03-29 LAB — CMV DNA SPEC NAA+PROBE-ACNC: NOT DETECTED IU/ML

## 2024-03-29 NOTE — TELEPHONE ENCOUNTER
Lab orders faxed to local lab for: cbc, bmp, tacrolimus level.     Tacrolimus level 7.4 at 12.5 hours. Goal 8-10  Level likely close to goal at 12 hour willian. Will repeat labs next week.     Creatinine 1.41. Pt will push fluids. Repeat labs next week

## 2024-03-30 ENCOUNTER — MYC MEDICAL ADVICE (OUTPATIENT)
Dept: PODIATRY | Facility: CLINIC | Age: 69
End: 2024-03-30
Payer: MEDICARE

## 2024-04-01 DIAGNOSIS — Z94.2 LUNG REPLACED BY TRANSPLANT (H): Primary | ICD-10-CM

## 2024-04-03 ENCOUNTER — LAB (OUTPATIENT)
Dept: LAB | Facility: OTHER | Age: 69
End: 2024-04-03
Attending: NURSE PRACTITIONER
Payer: MEDICARE

## 2024-04-03 DIAGNOSIS — Z94.2 LUNG REPLACED BY TRANSPLANT (H): ICD-10-CM

## 2024-04-03 DIAGNOSIS — E11.42 TYPE 2 DIABETES MELLITUS WITH DIABETIC POLYNEUROPATHY, WITH LONG-TERM CURRENT USE OF INSULIN (H): ICD-10-CM

## 2024-04-03 DIAGNOSIS — Z79.4 TYPE 2 DIABETES MELLITUS WITH DIABETIC POLYNEUROPATHY, WITH LONG-TERM CURRENT USE OF INSULIN (H): ICD-10-CM

## 2024-04-03 LAB
ANION GAP SERPL CALCULATED.3IONS-SCNC: 11 MMOL/L (ref 7–15)
BUN SERPL-MCNC: 23.9 MG/DL (ref 8–23)
CALCIUM SERPL-MCNC: 9.3 MG/DL (ref 8.8–10.2)
CHLORIDE SERPL-SCNC: 105 MMOL/L (ref 98–107)
CHOLEST SERPL-MCNC: 146 MG/DL
CREAT SERPL-MCNC: 1.04 MG/DL (ref 0.67–1.17)
DEPRECATED HCO3 PLAS-SCNC: 26 MMOL/L (ref 22–29)
EGFRCR SERPLBLD CKD-EPI 2021: 77 ML/MIN/1.73M2
ERYTHROCYTE [DISTWIDTH] IN BLOOD BY AUTOMATED COUNT: 15.4 % (ref 10–15)
FASTING STATUS PATIENT QL REPORTED: YES
GLUCOSE SERPL-MCNC: 103 MG/DL (ref 70–99)
HBA1C MFR BLD: <4 % (ref 4–6.2)
HCT VFR BLD AUTO: 37 % (ref 40–53)
HDLC SERPL-MCNC: 42 MG/DL
HGB BLD-MCNC: 11.3 G/DL (ref 13.3–17.7)
LDLC SERPL CALC-MCNC: 47 MG/DL
MAGNESIUM SERPL-MCNC: 1.8 MG/DL (ref 1.7–2.3)
MCH RBC QN AUTO: 33.4 PG (ref 26.5–33)
MCHC RBC AUTO-ENTMCNC: 30.5 G/DL (ref 31.5–36.5)
MCV RBC AUTO: 110 FL (ref 78–100)
NONHDLC SERPL-MCNC: 104 MG/DL
PLATELET # BLD AUTO: 265 10E3/UL (ref 150–450)
POTASSIUM SERPL-SCNC: 3.8 MMOL/L (ref 3.4–5.3)
RBC # BLD AUTO: 3.38 10E6/UL (ref 4.4–5.9)
SODIUM SERPL-SCNC: 142 MMOL/L (ref 135–145)
TACROLIMUS BLD-MCNC: 7.2 UG/L (ref 5–15)
TME LAST DOSE: NORMAL H
TME LAST DOSE: NORMAL H
TRIGL SERPL-MCNC: 284 MG/DL
WBC # BLD AUTO: 12.1 10E3/UL (ref 4–11)

## 2024-04-03 PROCEDURE — 80197 ASSAY OF TACROLIMUS: CPT | Mod: ZL

## 2024-04-03 PROCEDURE — 83735 ASSAY OF MAGNESIUM: CPT | Mod: ZL

## 2024-04-03 PROCEDURE — 80061 LIPID PANEL: CPT | Mod: ZL

## 2024-04-03 PROCEDURE — 85027 COMPLETE CBC AUTOMATED: CPT | Mod: ZL

## 2024-04-03 PROCEDURE — 80048 BASIC METABOLIC PNL TOTAL CA: CPT | Mod: ZL

## 2024-04-03 PROCEDURE — 83036 HEMOGLOBIN GLYCOSYLATED A1C: CPT | Mod: ZL

## 2024-04-03 PROCEDURE — 36415 COLL VENOUS BLD VENIPUNCTURE: CPT | Mod: ZL

## 2024-04-04 ENCOUNTER — PATIENT OUTREACH (OUTPATIENT)
Dept: ONCOLOGY | Facility: CLINIC | Age: 69
End: 2024-04-04

## 2024-04-04 ENCOUNTER — TELEPHONE (OUTPATIENT)
Dept: TRANSPLANT | Facility: CLINIC | Age: 69
End: 2024-04-04

## 2024-04-04 ENCOUNTER — ANCILLARY PROCEDURE (OUTPATIENT)
Dept: ULTRASOUND IMAGING | Facility: CLINIC | Age: 69
End: 2024-04-04
Attending: HOSPITALIST
Payer: MEDICARE

## 2024-04-04 ENCOUNTER — OFFICE VISIT (OUTPATIENT)
Dept: PODIATRY | Facility: CLINIC | Age: 69
End: 2024-04-04
Payer: MEDICARE

## 2024-04-04 ENCOUNTER — MYC MEDICAL ADVICE (OUTPATIENT)
Dept: TRANSPLANT | Facility: CLINIC | Age: 69
End: 2024-04-04

## 2024-04-04 DIAGNOSIS — E11.51 DIABETES MELLITUS WITH PERIPHERAL VASCULAR DISEASE (H): Primary | ICD-10-CM

## 2024-04-04 DIAGNOSIS — L97.521 SKIN ULCER OF LEFT FOOT, LIMITED TO BREAKDOWN OF SKIN (H): ICD-10-CM

## 2024-04-04 DIAGNOSIS — Z94.2 S/P LUNG TRANSPLANT (H): Chronic | ICD-10-CM

## 2024-04-04 DIAGNOSIS — E11.49 TYPE II OR UNSPECIFIED TYPE DIABETES MELLITUS WITH NEUROLOGICAL MANIFESTATIONS, NOT STATED AS UNCONTROLLED(250.60) (H): ICD-10-CM

## 2024-04-04 DIAGNOSIS — I70.211 ATHEROSCLEROSIS OF NATIVE ARTERIES OF EXTREMITIES WITH INTERMITTENT CLAUDICATION, RIGHT LEG (H): ICD-10-CM

## 2024-04-04 DIAGNOSIS — L97.912 SKIN ULCER OF RIGHT LOWER LEG WITH FAT LAYER EXPOSED (H): ICD-10-CM

## 2024-04-04 LAB — CMV DNA SPEC NAA+PROBE-ACNC: NOT DETECTED IU/ML

## 2024-04-04 PROCEDURE — 99214 OFFICE O/P EST MOD 30 MIN: CPT | Performed by: PODIATRIST

## 2024-04-04 PROCEDURE — 93924 LWR XTR VASC STDY BILAT: CPT | Performed by: RADIOLOGY

## 2024-04-04 RX ORDER — TACROLIMUS 1 MG/1
2 CAPSULE ORAL 2 TIMES DAILY
Qty: 120 CAPSULE | Refills: 11 | Status: SHIPPED | OUTPATIENT
Start: 2024-04-04 | End: 2024-04-09

## 2024-04-04 RX ORDER — TACROLIMUS 0.5 MG/1
0.5 CAPSULE ORAL 2 TIMES DAILY
Qty: 180 CAPSULE | Refills: 3 | Status: SHIPPED | OUTPATIENT
Start: 2024-04-04 | End: 2024-04-09

## 2024-04-04 RX ORDER — DOXYCYCLINE 100 MG/1
100 CAPSULE ORAL 2 TIMES DAILY
Qty: 60 CAPSULE | Refills: 0 | Status: SHIPPED | OUTPATIENT
Start: 2024-04-04 | End: 2024-05-15

## 2024-04-04 NOTE — TELEPHONE ENCOUNTER
Prior Authorization Not Needed per Insurance    Medication: TACROLIMUS (GENERIC EQUIVALENT) 1 MG PO CAPS  Insurance Company:  medicare  Expected CoPay: $  0 at time of service   Pharmacy Filling the Rx: [facility]     Pharmacy Notified: yes  Patient Notified: yes  Medication is to be billed under medicare part B due to having medicare at time of transplant,  Patient stated he wanted his immuno through [facility] not [facility]

## 2024-04-04 NOTE — TELEPHONE ENCOUNTER
Your tacrolimus level from 4/3 came back at 7.2  Goal 8-10     I have that you are currently taking 2mg in AM and 2.5mg in PM  Can you increase your dose to 2.5mg in AM and 2.5mg in PM     Recheck level again in a week!     Pt sent the following [employer] message:   Will do.  Just letting you know, got the results from the biopsy on the ulcer in the upper intestine it it cancer. This was done at [facility] in [place].. They are supposed to [facility] test. Haven't heard anything yet.     Reviewed with Dr. Murphy:  -Stop Imuran   -Will place hem/onc referral to try and expedite process

## 2024-04-04 NOTE — TELEPHONE ENCOUNTER
PA Initiation    Medication: TACROLIMUS (GENERIC EQUIVALENT) 1 MG PO CAPS  Insurance Company:    Pharmacy Filling the Rx:    Filling Pharmacy Phone:    Filling Pharmacy Fax:    Start Date: 4/4/2024
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
## 2024-04-04 NOTE — PROGRESS NOTES
Discontinue the colchicine and start Indocin   New Patient Oncology Nurse Navigator Note     Referring provider: Alma Murphy MD      Referring Clinic/Organization: [facility] - [facility]     Referred to (specialty:) Medical Oncology     Requested provider (if applicable): NA     Date Referral Received: April 4, 2024     Evaluation for:  Z94.2 (ICD-10-CM) - S/P lung transplant (H)     Pt S/P lung transplant. Had EGD done at outside facility, biopsy came back with B cell Lymphoma     Clinical History (per Nurse review of records provided):      Aubrey is a 68 year old gentleman with past medical history of alpha 1 antitrypsin deficiency s/p bilateral lung transplant, CKD 3, type 2 diabetes, hypertension, CAD s/p staged PCI January/February 2024 on DAPT who was admitted to [facility] on 3/21/2024 for treatment of melena and acute blood loss anemia with a hemoglobin of 8.2 down from 11. Gastroenterology was consulted and recommended EGD. EGD revealed a 5 cm segment of ulcerated and friable mucosa in the second segment of the duodenum. Case was discussed with Cardiology who recommended to continue DAPT with aspirin and Plavix given recent stenting (2/16/24). This should ideally not be interrupted for 12 months unless there is life threatening bleeding. Hemoglobin remained stable following the procedure and the patient was asymptomatic. He was discharged home on 3/23/24 with return precautions and instructions to follow up with his PCP at [facility]. If bleeding recurs, GI recommended consideration of IR intervention.     Case Report Surgical Pathology Report                         Case: [id number]                                Authorizing Provider:  Vernon Campos MD         Collected:           03/22/2024 1801              Ordering Location:     [facility]      Received:            03/22/2024 1818                                     [facility]                                              "                                  SURGICAL SERVICES                                                            Pathologist:           Mk Walker MD                                                            Specimen:    Small Intestine, Duodenal bx                                                           Final Dx    Duodenum, biopsies: Diffuse large B-cell lymphoma, germinal center type.   Electronically signed by Mk Walker MD on 3/28/2024 at 10:06 AM   Comments    MYC FISH studies will be attempted on the block however due to limited tissue these may not be diagnostic.  Addendum to follow.  Selected slide(s) are also reviewed by Dr. Sam Bay, who concurs with the presence of malignancy in this case.    Addendum 1    B-cell lymphoma, FISH study results are received from [facility], [place], [place] by consultant Ana Whyte D.O., on 04/03/2024.     RESULT SUMMARY:  Negative     INTERPRETATION: No rearrangement of MYC or BCL6 and no fusion of MYC and IGH was observed, therefore this makes unlikely the possibility of \"high-grade B-cell lymphoma with MYC and BCL2 and/or BCL6 rearrangements\" (double-hit lymphoma; Swerdlow et al., WHO Classification of Tumours of Haematopoietic and Lymphoid Tissues, IARC Press:[employer], 2017).       Clinical and pathologic correlation is recommended.      BLYMF RESULT TABLE:        Abnormality Name             Result     Abn%     Cutoff%        8q24.1(MYC sep)              Normal                  <25.0          t(8;14) MYC/IGH fusion     Normal                  <15.0          3q27(BCL6 sep)                Normal                  <25.0            BLYMF RESULT:  nuc kaylee(IGHx3)[67/100]      Addendum comment:  This reports additional findings over original report dated 03/28/2024 by Mk Walker MD.   Addendum electronically signed by Mk Walker MD on 4/3/2024 at  3:03 PM        Records Location: Care Everywhere and See Bookmarked material     Records Needed: NA   "   Additional testing needed prior to consult: ?    Message sent to consulting MD for recommendation for additional testing.     Payor: MEDICARE / Plan: MEDICARE / Product Type: Medicare /     April 4, 2024    Called patient to introduced myself and role as nurse navigator with [facility] Hematology/Oncology department and to inform them that we have received the referral for a diagnosis of DLBCL from Dr. Alma Murphy. Patient confirms they are aware of the referral and ready to schedule.  Provided them with contact information for NPS and encourage them to call with any questions. Patient verbalized understanding of plan. Transferred to NPS to schedule.     April 8, 2024  Called patient this morning with updated plan of care per Dr. Drake.     Per Dr. Drake:  1: Orders for labs and STAT PET-CT.   2: Can we also work on getting his OSH slides sent here for review?   3: Per Dr. Drake he should go to the ED if he has recurrent GI bleeding or any new concerns symptoms   4: Maybe able to move up appointment based on the timing of PET and imaging for review.     Explained the above to patient. Patient verbalized understanding. Okay with proceeding with PET scan and will go to ED with any signs of GI bleed or new/concerning symptoms.     Meena AGUILARN, RN   Oncology Nurse Navigator   [facility]   [phone] / [phone]
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
## 2024-04-04 NOTE — NURSING NOTE
"Aubrey Duncan's chief complaint for this visit includes:  Chief Complaint   Patient presents with    RECHECK     R and L leg wound checks.      PCP: Hoa Olivarez    Referring Provider:  No referring provider defined for this encounter.    There were no vitals taken for this visit.  Data Unavailable        Allergies   Allergen Reactions    Heparin Other (See Comments)     HIT positive and AUGUST positive    No Heparin Antibody Identified on 8/15 blood test    Oxycodone Other (See Comments)     Significant lethargy, confusion. Tolerates dilaudid well.     Fluocinolone Other (See Comments)     Tendon problems      Levaquin Muscle Pain (Myalgia)    Pneumococcal Vaccine Other (See Comments)     Other reaction(s): Fever  \"My arm swelled up like a balloon.\"    Varicella Zoster Immune Globulin Swelling         Do you need any medication refills at today's visit?      "

## 2024-04-04 NOTE — NURSING NOTE
DME FITTING    Relevant Diagnosis: skin ulcer of right foot, skin ulcer of left foot  Post-op shoe was fit on patient's Bilateral feet.     Person(s) involved in teaching:   Patient    Brace was applied in standard Manner:  Yes  Brace fit well:  Yes  Patient reports brace to fit comfortably:  Yes    Education:   Patient shown self application and removal of brace: Yes  Patient shown how to adjust brace fit, if necessary: Yes  Patient educated on billing and return policy: Yes  Patient confirmed understanding when and how to contact clinic with concerns: Yes

## 2024-04-05 ENCOUNTER — MYC MEDICAL ADVICE (OUTPATIENT)
Dept: FAMILY MEDICINE | Facility: OTHER | Age: 69
End: 2024-04-05
Payer: MEDICARE

## 2024-04-05 DIAGNOSIS — M79.671 BILATERAL FOOT PAIN: ICD-10-CM

## 2024-04-05 DIAGNOSIS — E11.51 DIABETES MELLITUS WITH PERIPHERAL VASCULAR DISEASE (H): ICD-10-CM

## 2024-04-05 DIAGNOSIS — M79.672 BILATERAL FOOT PAIN: ICD-10-CM

## 2024-04-05 DIAGNOSIS — M10.9 ACUTE GOUT, UNSPECIFIED CAUSE, UNSPECIFIED SITE: Primary | ICD-10-CM

## 2024-04-06 NOTE — TELEPHONE ENCOUNTER
FUTURE VISIT INFORMATION      FUTURE VISIT INFORMATION:  Date: 5.15.24  Time: 9:00  Location: [facility]  REFERRAL INFORMATION:  Referring provider:  Natty  Referring providers clinic:  Derm  Reason for visit/diagnosis  Left temple: - Squamous cell carcinoma in situ - (see description) B. Right facial cheek: - Collision of squamous cell carcinoma at least in situ and nodular basal cell carcinoma      RECORDS REQUESTED FROM:       Clinic name Comments Records Status Imaging Status   Derm 2.6.24  Path # [id number] Epic Epic

## 2024-04-08 DIAGNOSIS — C83.398 DIFFUSE LARGE B-CELL LYMPHOMA OF SOLID ORGAN EXCLUDING SPLEEN: ICD-10-CM

## 2024-04-08 DIAGNOSIS — Z11.59 SCREENING FOR VIRAL DISEASE: ICD-10-CM

## 2024-04-08 DIAGNOSIS — Z11.4 ENCOUNTER FOR SCREENING FOR HUMAN IMMUNODEFICIENCY VIRUS (HIV): ICD-10-CM

## 2024-04-08 DIAGNOSIS — Z94.2 S/P LUNG TRANSPLANT (H): Primary | Chronic | ICD-10-CM

## 2024-04-08 RX ORDER — PREDNISONE 20 MG/1
TABLET ORAL
Qty: 20 TABLET | Refills: 0 | Status: SHIPPED | OUTPATIENT
Start: 2024-04-08 | End: 2024-05-15

## 2024-04-08 NOTE — TELEPHONE ENCOUNTER
Action 2024 11:21 AM ABT   Action Taken Slides from [facility] received and taken to 5th floor path lab for review.    24: [id number]     RECORDS STATUS - ALL OTHER DIAGNOSIS      RECORDS RECEIVED FROM: Epic, [facility]   DATE RECEIVED:    NOTES STATUS DETAILS   OFFICE NOTE from referring provider Epic Dr. Alma Murphy   DISCHARGE REPORT from the ER [facility] 24: [place]    OPERATIVE REPORT -[facility] 24: EGD   MEDICATION LIST [facility]    LABS     PATHOLOGY REPORTS Req -[facility]  [employer] 24: [id number]   ANYTHING RELATED TO DIAGNOSIS Epic Most recent 24   IMAGING (NEED IMAGES & REPORT)     CT SCANS PACS 22: CTA AP  17, 10/17/17: CT AP   MRI PACS 24, 23: MR Abd   ULTRASOUND PACS 14: US Abd

## 2024-04-09 DIAGNOSIS — Z94.2 S/P LUNG TRANSPLANT (H): Chronic | ICD-10-CM

## 2024-04-09 RX ORDER — TACROLIMUS 0.5 MG/1
0.5 CAPSULE ORAL 2 TIMES DAILY
Qty: 180 CAPSULE | Refills: 3 | Status: SHIPPED | OUTPATIENT
Start: 2024-04-09 | End: 2024-04-11

## 2024-04-09 RX ORDER — TACROLIMUS 1 MG/1
2 CAPSULE ORAL 2 TIMES DAILY
Qty: 120 CAPSULE | Refills: 11 | Status: SHIPPED | OUTPATIENT
Start: 2024-04-09 | End: 2024-04-11

## 2024-04-10 ENCOUNTER — LAB (OUTPATIENT)
Dept: LAB | Facility: OTHER | Age: 69
End: 2024-04-10
Payer: MEDICARE

## 2024-04-10 ENCOUNTER — LAB (OUTPATIENT)
Dept: LAB | Facility: CLINIC | Age: 69
End: 2024-04-10
Payer: MEDICARE

## 2024-04-10 DIAGNOSIS — Z94.2 LUNG REPLACED BY TRANSPLANT (H): ICD-10-CM

## 2024-04-10 DIAGNOSIS — C85.10 B-CELL LYMPHOMA (H): Primary | ICD-10-CM

## 2024-04-10 LAB
ANION GAP SERPL CALCULATED.3IONS-SCNC: 10 MMOL/L (ref 7–15)
BUN SERPL-MCNC: 30.5 MG/DL (ref 8–23)
CALCIUM SERPL-MCNC: 9 MG/DL (ref 8.8–10.2)
CHLORIDE SERPL-SCNC: 111 MMOL/L (ref 98–107)
CREAT SERPL-MCNC: 1.14 MG/DL (ref 0.67–1.17)
DEPRECATED HCO3 PLAS-SCNC: 23 MMOL/L (ref 22–29)
EGFRCR SERPLBLD CKD-EPI 2021: 69 ML/MIN/1.73M2
ERYTHROCYTE [DISTWIDTH] IN BLOOD BY AUTOMATED COUNT: 14.4 % (ref 10–15)
GLUCOSE SERPL-MCNC: 121 MG/DL (ref 70–99)
HCT VFR BLD AUTO: 35.8 % (ref 40–53)
HGB BLD-MCNC: 11 G/DL (ref 13.3–17.7)
MCH RBC QN AUTO: 33.6 PG (ref 26.5–33)
MCHC RBC AUTO-ENTMCNC: 30.7 G/DL (ref 31.5–36.5)
MCV RBC AUTO: 110 FL (ref 78–100)
PLATELET # BLD AUTO: 246 10E3/UL (ref 150–450)
POTASSIUM SERPL-SCNC: 5 MMOL/L (ref 3.4–5.3)
RBC # BLD AUTO: 3.27 10E6/UL (ref 4.4–5.9)
SODIUM SERPL-SCNC: 144 MMOL/L (ref 135–145)
TACROLIMUS BLD-MCNC: 6.7 UG/L (ref 5–15)
TME LAST DOSE: NORMAL H
TME LAST DOSE: NORMAL H
WBC # BLD AUTO: 11.8 10E3/UL (ref 4–11)

## 2024-04-10 PROCEDURE — 80197 ASSAY OF TACROLIMUS: CPT | Mod: ZL

## 2024-04-10 PROCEDURE — 88321 CONSLTJ&REPRT SLD PREP ELSWR: CPT | Performed by: PATHOLOGY

## 2024-04-10 PROCEDURE — 85027 COMPLETE CBC AUTOMATED: CPT | Mod: ZL

## 2024-04-10 PROCEDURE — 36415 COLL VENOUS BLD VENIPUNCTURE: CPT | Mod: ZL

## 2024-04-10 PROCEDURE — 80048 BASIC METABOLIC PNL TOTAL CA: CPT | Mod: ZL

## 2024-04-11 ENCOUNTER — MYC MEDICAL ADVICE (OUTPATIENT)
Dept: TRANSPLANT | Facility: CLINIC | Age: 69
End: 2024-04-11
Payer: MEDICARE

## 2024-04-11 DIAGNOSIS — Z94.2 S/P LUNG TRANSPLANT (H): Chronic | ICD-10-CM

## 2024-04-11 RX ORDER — TACROLIMUS 1 MG/1
2 CAPSULE ORAL 2 TIMES DAILY
Status: SHIPPED
Start: 2024-04-11 | End: 2024-04-11

## 2024-04-11 RX ORDER — TACROLIMUS 1 MG/1
CAPSULE ORAL
Qty: 150 CAPSULE | Refills: 11 | Status: SHIPPED | OUTPATIENT
Start: 2024-04-11 | End: 2024-04-18

## 2024-04-11 RX ORDER — TACROLIMUS 0.5 MG/1
0.5 CAPSULE ORAL DAILY
Qty: 30 CAPSULE | Refills: 11 | Status: SHIPPED | OUTPATIENT
Start: 2024-04-11 | End: 2024-04-18

## 2024-04-11 RX ORDER — TACROLIMUS 0.5 MG/1
0.5 CAPSULE ORAL 2 TIMES DAILY
Status: SHIPPED
Start: 2024-04-11 | End: 2024-04-11

## 2024-04-11 NOTE — LETTER
PHYSICIAN ORDERS      DATE & TIME ISSUED: 2024 9:25 AM  PATIENT NAME: Aubrey Duncan   : 1955     [facility] MR# [if applicable]: [id number]     DIAGNOSIS:  Lung Transplant  Z94.2  Please check tacrolimus level week of 4/15/24      Any questions please call: [place] [phone]    Please fax these results to [phone].         Alma Murphy MD  Pulmonary Disease

## 2024-04-11 NOTE — TELEPHONE ENCOUNTER
Saulo Burgos,      Your tacrolimus level came back at 6.7.   Goal 8-10     Can you increase your dose to 2.5mg in AM and 3mg in PM?   Recheck level in a week again.       Pt understanding of plan

## 2024-04-12 NOTE — TELEPHONE ENCOUNTER
Okay to increase gabapentin to 100 mg 1-2 tablets twice daily. Let me know how this is working and we can update your prescription. We have lots of room to increase dose if needed. BRANDI Manning CNP on 4/12/2024 at 7:09 AM

## 2024-04-13 ENCOUNTER — HOSPITAL ENCOUNTER (OUTPATIENT)
Dept: PET IMAGING | Facility: CLINIC | Age: 69
Discharge: HOME | End: 2024-04-13
Attending: INTERNAL MEDICINE
Payer: MEDICARE

## 2024-04-13 DIAGNOSIS — C83.398 DIFFUSE LARGE B-CELL LYMPHOMA OF SOLID ORGAN EXCLUDING SPLEEN: ICD-10-CM

## 2024-04-13 DIAGNOSIS — Z94.2 S/P LUNG TRANSPLANT (H): Chronic | ICD-10-CM

## 2024-04-13 PROCEDURE — 70491 CT SOFT TISSUE NECK W/DYE: CPT

## 2024-04-13 PROCEDURE — 78816 PET IMAGE W/CT FULL BODY: CPT | Mod: MG,PI

## 2024-04-13 PROCEDURE — A9552 F18 FDG: HCPCS | Performed by: INTERNAL MEDICINE

## 2024-04-13 PROCEDURE — 71260 CT THORAX DX C+: CPT

## 2024-04-13 PROCEDURE — 250N000011 HC RX IP 250 OP 636: Performed by: INTERNAL MEDICINE

## 2024-04-13 PROCEDURE — 343N000001 HC RX 343: Performed by: INTERNAL MEDICINE

## 2024-04-13 RX ORDER — IOPAMIDOL 755 MG/ML
10-135 INJECTION, SOLUTION INTRAVASCULAR ONCE
Status: COMPLETED | OUTPATIENT
Start: 2024-04-13 | End: 2024-04-13

## 2024-04-13 RX ADMIN — FLUDEOXYGLUCOSE F-18 12.7 MILLICURIE: 500 INJECTION, SOLUTION INTRAVENOUS at 12:41

## 2024-04-13 RX ADMIN — IOPAMIDOL 99 ML: 755 INJECTION, SOLUTION INTRAVENOUS at 12:42

## 2024-04-15 LAB
PATH REPORT.COMMENTS IMP SPEC: ABNORMAL
PATH REPORT.COMMENTS IMP SPEC: YES
PATH REPORT.FINAL DX SPEC: ABNORMAL
PATH REPORT.GROSS SPEC: ABNORMAL
PATH REPORT.MICROSCOPIC SPEC OTHER STN: ABNORMAL
PATH REPORT.RELEVANT HX SPEC: ABNORMAL
PATH REPORT.RELEVANT HX SPEC: ABNORMAL
PATH REPORT.SITE OF ORIGIN SPEC: ABNORMAL

## 2024-04-16 ENCOUNTER — LAB (OUTPATIENT)
Dept: LAB | Facility: OTHER | Age: 69
End: 2024-04-16
Payer: MEDICARE

## 2024-04-16 DIAGNOSIS — I10 ESSENTIAL HYPERTENSION: Chronic | ICD-10-CM

## 2024-04-16 DIAGNOSIS — C83.398 DIFFUSE LARGE B-CELL LYMPHOMA OF SOLID ORGAN EXCLUDING SPLEEN: ICD-10-CM

## 2024-04-16 DIAGNOSIS — Z94.2 S/P LUNG TRANSPLANT (H): ICD-10-CM

## 2024-04-16 DIAGNOSIS — Z11.4 ENCOUNTER FOR SCREENING FOR HUMAN IMMUNODEFICIENCY VIRUS (HIV): ICD-10-CM

## 2024-04-16 DIAGNOSIS — Z11.59 SCREENING FOR VIRAL DISEASE: ICD-10-CM

## 2024-04-16 LAB
ALBUMIN SERPL BCG-MCNC: 3.7 G/DL (ref 3.5–5.2)
ALP SERPL-CCNC: 47 U/L (ref 40–150)
ALT SERPL W P-5'-P-CCNC: 51 U/L (ref 0–70)
ANION GAP SERPL CALCULATED.3IONS-SCNC: 11 MMOL/L (ref 7–15)
AST SERPL W P-5'-P-CCNC: 43 U/L (ref 0–45)
BASOPHILS # BLD AUTO: 0 10E3/UL (ref 0–0.2)
BASOPHILS NFR BLD AUTO: 0 %
BILIRUB SERPL-MCNC: 0.5 MG/DL
BUN SERPL-MCNC: 35.1 MG/DL (ref 8–23)
CALCIUM SERPL-MCNC: 8.6 MG/DL (ref 8.8–10.2)
CHLORIDE SERPL-SCNC: 108 MMOL/L (ref 98–107)
CREAT SERPL-MCNC: 1.04 MG/DL (ref 0.67–1.17)
DEPRECATED HCO3 PLAS-SCNC: 24 MMOL/L (ref 22–29)
EGFRCR SERPLBLD CKD-EPI 2021: 77 ML/MIN/1.73M2
EOSINOPHIL # BLD AUTO: 0.1 10E3/UL (ref 0–0.7)
EOSINOPHIL NFR BLD AUTO: 1 %
ERYTHROCYTE [DISTWIDTH] IN BLOOD BY AUTOMATED COUNT: 13.7 % (ref 10–15)
GLUCOSE SERPL-MCNC: 62 MG/DL (ref 70–99)
HCT VFR BLD AUTO: 37.4 % (ref 40–53)
HGB BLD-MCNC: 11.6 G/DL (ref 13.3–17.7)
IMM GRANULOCYTES # BLD: 0.2 10E3/UL
IMM GRANULOCYTES NFR BLD: 1 %
LDH SERPL L TO P-CCNC: 277 U/L (ref 0–250)
LYMPHOCYTES # BLD AUTO: 4.4 10E3/UL (ref 0.8–5.3)
LYMPHOCYTES NFR BLD AUTO: 32 %
MCH RBC QN AUTO: 32.4 PG (ref 26.5–33)
MCHC RBC AUTO-ENTMCNC: 31 G/DL (ref 31.5–36.5)
MCV RBC AUTO: 105 FL (ref 78–100)
MONOCYTES # BLD AUTO: 1.1 10E3/UL (ref 0–1.3)
MONOCYTES NFR BLD AUTO: 8 %
NEUTROPHILS # BLD AUTO: 8 10E3/UL (ref 1.6–8.3)
NEUTROPHILS NFR BLD AUTO: 58 %
NRBC # BLD AUTO: 0 10E3/UL
NRBC BLD AUTO-RTO: 0 /100
PHOSPHATE SERPL-MCNC: 3 MG/DL (ref 2.5–4.5)
PLATELET # BLD AUTO: 246 10E3/UL (ref 150–450)
POTASSIUM SERPL-SCNC: 4.5 MMOL/L (ref 3.4–5.3)
PROT SERPL-MCNC: 5.9 G/DL (ref 6.4–8.3)
RBC # BLD AUTO: 3.58 10E6/UL (ref 4.4–5.9)
SODIUM SERPL-SCNC: 143 MMOL/L (ref 135–145)
URATE SERPL-MCNC: 5.9 MG/DL (ref 3.4–7)
WBC # BLD AUTO: 13.7 10E3/UL (ref 4–11)

## 2024-04-16 PROCEDURE — 85025 COMPLETE CBC W/AUTO DIFF WBC: CPT | Mod: ZL

## 2024-04-16 PROCEDURE — 86704 HEP B CORE ANTIBODY TOTAL: CPT | Mod: ZL

## 2024-04-16 PROCEDURE — 87799 DETECT AGENT NOS DNA QUANT: CPT | Mod: ZL

## 2024-04-16 PROCEDURE — 82247 BILIRUBIN TOTAL: CPT | Mod: ZL

## 2024-04-16 PROCEDURE — 83615 LACTATE (LD) (LDH) ENZYME: CPT | Mod: ZL

## 2024-04-16 PROCEDURE — 84450 TRANSFERASE (AST) (SGOT): CPT | Mod: ZL

## 2024-04-16 PROCEDURE — 86706 HEP B SURFACE ANTIBODY: CPT | Mod: ZL

## 2024-04-16 PROCEDURE — 84550 ASSAY OF BLOOD/URIC ACID: CPT | Mod: ZL

## 2024-04-16 PROCEDURE — 36415 COLL VENOUS BLD VENIPUNCTURE: CPT | Mod: ZL

## 2024-04-16 PROCEDURE — 84100 ASSAY OF PHOSPHORUS: CPT | Mod: ZL

## 2024-04-16 PROCEDURE — 87340 HEPATITIS B SURFACE AG IA: CPT | Mod: ZL

## 2024-04-16 PROCEDURE — 86803 HEPATITIS C AB TEST: CPT | Mod: ZL

## 2024-04-16 PROCEDURE — 87389 HIV-1 AG W/HIV-1&-2 AB AG IA: CPT | Mod: ZL

## 2024-04-16 RX ORDER — GABAPENTIN 100 MG/1
200 CAPSULE ORAL 2 TIMES DAILY
Qty: 360 CAPSULE | Refills: 3 | Status: SHIPPED | OUTPATIENT
Start: 2024-04-16 | End: 2024-05-16

## 2024-04-17 ENCOUNTER — LAB (OUTPATIENT)
Dept: LAB | Facility: OTHER | Age: 69
End: 2024-04-17
Attending: INTERNAL MEDICINE
Payer: MEDICARE

## 2024-04-17 DIAGNOSIS — Z94.2 LUNG REPLACED BY TRANSPLANT (H): ICD-10-CM

## 2024-04-17 LAB
EBV DNA SERPL NAA+PROBE-ACNC: NOT DETECTED IU/ML
ERYTHROCYTE [DISTWIDTH] IN BLOOD BY AUTOMATED COUNT: 13.4 % (ref 10–15)
HBV CORE AB SERPL QL IA: NONREACTIVE
HBV SURFACE AB SERPL IA-ACNC: 6.18 M[IU]/ML
HBV SURFACE AB SERPL IA-ACNC: NONREACTIVE M[IU]/ML
HBV SURFACE AG SERPL QL IA: NONREACTIVE
HCT VFR BLD AUTO: 36.5 % (ref 40–53)
HCV AB SERPL QL IA: NONREACTIVE
HGB BLD-MCNC: 11.5 G/DL (ref 13.3–17.7)
HIV 1+2 AB+HIV1 P24 AG SERPL QL IA: NONREACTIVE
MCH RBC QN AUTO: 33 PG (ref 26.5–33)
MCHC RBC AUTO-ENTMCNC: 31.5 G/DL (ref 31.5–36.5)
MCV RBC AUTO: 105 FL (ref 78–100)
PLATELET # BLD AUTO: 209 10E3/UL (ref 150–450)
RBC # BLD AUTO: 3.48 10E6/UL (ref 4.4–5.9)
TACROLIMUS BLD-MCNC: 7.5 UG/L (ref 5–15)
TME LAST DOSE: NORMAL H
TME LAST DOSE: NORMAL H
WBC # BLD AUTO: 12.9 10E3/UL (ref 4–11)

## 2024-04-17 PROCEDURE — 36415 COLL VENOUS BLD VENIPUNCTURE: CPT | Mod: ZL

## 2024-04-17 PROCEDURE — 80197 ASSAY OF TACROLIMUS: CPT | Mod: ZL

## 2024-04-17 PROCEDURE — 85014 HEMATOCRIT: CPT | Mod: ZL

## 2024-04-18 ENCOUNTER — PRE VISIT (OUTPATIENT)
Dept: ONCOLOGY | Facility: CLINIC | Age: 69
End: 2024-04-18
Payer: MEDICARE

## 2024-04-18 ENCOUNTER — PATIENT OUTREACH (OUTPATIENT)
Dept: GASTROENTEROLOGY | Facility: CLINIC | Age: 69
End: 2024-04-18
Payer: MEDICARE

## 2024-04-18 ENCOUNTER — PATIENT OUTREACH (OUTPATIENT)
Dept: ONCOLOGY | Facility: CLINIC | Age: 69
End: 2024-04-18
Payer: MEDICARE

## 2024-04-18 ENCOUNTER — ONCOLOGY VISIT (OUTPATIENT)
Dept: ONCOLOGY | Facility: CLINIC | Age: 69
End: 2024-04-18
Attending: INTERNAL MEDICINE
Payer: MEDICARE

## 2024-04-18 ENCOUNTER — MYC MEDICAL ADVICE (OUTPATIENT)
Dept: TRANSPLANT | Facility: CLINIC | Age: 69
End: 2024-04-18
Payer: MEDICARE

## 2024-04-18 VITALS
RESPIRATION RATE: 16 BRPM | WEIGHT: 163 LBS | HEIGHT: 68 IN | HEART RATE: 94 BPM | DIASTOLIC BLOOD PRESSURE: 71 MMHG | TEMPERATURE: 98.8 F | BODY MASS INDEX: 24.71 KG/M2 | SYSTOLIC BLOOD PRESSURE: 138 MMHG | OXYGEN SATURATION: 95 %

## 2024-04-18 DIAGNOSIS — Z94.2 S/P LUNG TRANSPLANT (H): Chronic | ICD-10-CM

## 2024-04-18 DIAGNOSIS — D47.Z1 PTLD (POST-TRANSPLANT LYMPHOPROLIFERATIVE DISORDER) (H): Primary | ICD-10-CM

## 2024-04-18 DIAGNOSIS — D84.821 IMMUNODEFICIENCY DUE TO DRUGS (CODE) (H): ICD-10-CM

## 2024-04-18 DIAGNOSIS — Z95.5 S/P CORONARY ARTERY STENT PLACEMENT: ICD-10-CM

## 2024-04-18 PROCEDURE — G2211 COMPLEX E/M VISIT ADD ON: HCPCS | Performed by: INTERNAL MEDICINE

## 2024-04-18 PROCEDURE — G0463 HOSPITAL OUTPT CLINIC VISIT: HCPCS | Performed by: INTERNAL MEDICINE

## 2024-04-18 PROCEDURE — 99215 OFFICE O/P EST HI 40 MIN: CPT | Performed by: INTERNAL MEDICINE

## 2024-04-18 RX ORDER — TACROLIMUS 0.5 MG/1
0.5 CAPSULE ORAL DAILY
Status: ON HOLD
Start: 2024-04-18 | End: 2024-08-01

## 2024-04-18 RX ORDER — ACYCLOVIR 400 MG/1
400 TABLET ORAL 2 TIMES DAILY
Qty: 60 TABLET | Refills: 3 | Status: SHIPPED | OUTPATIENT
Start: 2024-04-18 | End: 2024-08-07

## 2024-04-18 RX ORDER — TACROLIMUS 1 MG/1
CAPSULE ORAL
Qty: 180 CAPSULE | Refills: 11 | Status: ON HOLD | OUTPATIENT
Start: 2024-04-18 | End: 2024-09-04

## 2024-04-18 RX ORDER — AMLODIPINE BESYLATE 10 MG/1
10 TABLET ORAL DAILY
Qty: 90 TABLET | Refills: 3 | Status: ON HOLD | OUTPATIENT
Start: 2024-04-18 | End: 2024-09-04

## 2024-04-18 ASSESSMENT — PAIN SCALES - GENERAL: PAINLEVEL: NO PAIN (0)

## 2024-04-18 NOTE — TELEPHONE ENCOUNTER
[facility]:  Saulo Burgos,      Your tacrolimus level came back at 7.5 from your labs yesterday.   Goal 8-10     Can you increase your dose again to 3mg in AM and 3mg in PM?   Recheck level in 7-10 days.      You are so close to goal!!     Pt understanding of plan

## 2024-04-18 NOTE — LETTER
4/18/2024         RE: Aubrey Duncan  [address]  [place] [address]        Dear Colleague,    Thank you for referring your patient, Aubrey Duncan, to the [facility]. Please see a copy of my visit note below.                                     [facility]               NEW PATIENT REFERRAL        Apr 18, 2024   Subjective  REFERRAL SOURCE: Alma Murphy MD    REASON FOR VISIT: New DLBCL/PTLD    HISTORY OF PRESENT ILLNESS:  Mr. Aubrey Duncan is a 68 year old male who presents for consultation regarding newly diagnosed DLBCL in setting of bilateral lung transplant in 2013.     He presented to [facility] on 3/21/24 with melena and acute blood loss anemia with Hgb down to 8.2 from baseline of 11-12. EGD revealed a 5 cm segment of ulcerated and friable mucosa in the second segment of the duodenum. There was no active bleeding and the area was too diffuse to be treated endoscopically, but the area was biopsied. Case was discussed with Cardiology who recommended to continue DAPT with aspirin and Plavix given recent stenting (2/16/24). Pathology now back showing diffuse large B-cell lymphoma, GCB-subtype, with Ki67 80-90%. JUNG DAKOTA is negative.     Aubrey is here today with his wife Kyung. He denies any further melena since initial episode. He has occasional abdominal pain but thinks it is related to gas. Otherwise no fevers, night sweats, SOB, chest pain, N/V, or other bleeding. Biggest issue is bilateral lower extremity pain/claudication related to PAD. Energy is pretty good but activity is limited by the claudication. Still doing daily activities like cooking. He continues on tacrolimus and low-dose prednisone for lung transplant immunosuppression but azathioprine was stopped with his PTLD diagnosis.     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  No hematologic malignancies that he is aware of.     SOCIAL HISTORY:  Former smoker, quit in 1986 (2 packs/day x 15 years). Denies alcohol use.  Lives in [place], [place].      Allergies   Allergen Reactions    Heparin Other (See Comments)     HIT positive and AUGUST positive    No Heparin Antibody Identified on 8/15 blood test    Oxycodone Other (See Comments)     Significant lethargy, confusion. Tolerates dilaudid well.     Fluocinolone Other (See Comments)     Tendon problems      Levaquin Muscle Pain (Myalgia)    Pneumococcal Vaccine Other (See Comments)     Other reaction(s): Fever  \"My arm swelled up like a balloon.\"    Varicella Zoster Immune Globulin Swelling       Current Outpatient Medications:     acetaminophen (TYLENOL) 325 MG tablet, Take 2 tablets (650 mg) by mouth every 6 hours as needed for mild pain, Disp: 60 tablet, Rfl: 0    albuterol (PROAIR HFA/PROVENTIL HFA/VENTOLIN HFA) 108 (90 Base) MCG/ACT inhaler, Inhale 1-2 puffs into the lungs every 6 hours as needed for shortness of breath or wheezing, Disp: 8.5 g, Rfl: 3    amLODIPine (NORVASC) 10 MG tablet, Take 1 tablet (10 mg) by mouth daily, Disp: 90 tablet, Rfl: 3    aspirin (ASA) 81 MG EC tablet, Take 1 tablet (81 mg) by mouth daily, Disp: 90 tablet, Rfl: 3    azithromycin (ZITHROMAX) 250 MG tablet, Take 250 mg by mouth Every Mon, Wed, Fri Morning, Disp: , Rfl:     blood glucose (NO BRAND SPECIFIED) test strip, USE TO TEST BLOOD SUGAR 3 TIMES DAILY. DIAG CODE: E11.9, Disp: 300 strip, Rfl: 0    Calcium Carbonate-Vitamin D 600-10 MG-MCG " TABS, Take 1 tablet by mouth 2 times daily (with meals), Disp: 60 tablet, Rfl: 11    carvedilol (COREG) 6.25 MG tablet, TAKE 1 TABLET (6.25 MG) BY MOUTH 2 TIMES DAILY (WITH MEALS), Disp: 120 tablet, Rfl: 3    clopidogrel (PLAVIX) 75 MG tablet, Take 1 tablet (75 mg) by mouth daily, Disp: 90 tablet, Rfl: 3    dapsone (ACZONE) 25 MG tablet, Take 2 tablets (50 mg) by mouth daily, Disp: 180 tablet, Rfl: 3    doxycycline hyclate (VIBRAMYCIN) 100 MG capsule, Take 1 capsule (100 mg) by mouth 2 times daily, Disp: 60 capsule, Rfl: 0    econazole nitrate 1 % external cream, APPLY TOPICALLY DAILY TO FEET AND HEELS., Disp: 85 g, Rfl: 3    fludrocortisone (FLORINEF) 0.1 MG tablet, Take 1 tablet (0.1 mg) by mouth daily, Disp: 90 tablet, Rfl: 3    fluticasone-salmeterol (ADVAIR-HFA) 230-21 MCG/ACT inhaler, Inhale 2 puffs into the lungs 2 times daily, Disp: 8 g, Rfl: 11    furosemide (LASIX) 20 MG tablet, Take 1 tablet (20 mg) by mouth daily, Disp: 90 tablet, Rfl: 4    gabapentin (NEURONTIN) 100 MG capsule, Take 2 capsules (200 mg) by mouth 2 times daily, Disp: 360 capsule, Rfl: 3    insulin aspart (NOVOLOG PEN) 100 UNIT/ML pen, Take 5 U am, 3 unit(s) non, 5 unit(s) pm of insulin within 30 minutes of start of breakfast, lunch, and dinner. Do not give if blood sugar is less than 70 mg/dl., Disp: , Rfl:     insulin glargine (LANTUS PEN) 100 UNIT/ML pen, Inject 18 Units Subcutaneous every morning (before breakfast), Disp: , Rfl:     insulin pen needle (32G X 4 MM) 32G X 4 MM miscellaneous, Use 4 pen needles daily or as directed. Dispense as insurance allows. Dx. Code: E09.9, Disp: 400 each, Rfl: 11    Lancet Devices (MICROLET NEXT LANCING DEVICE) MISC, USE AS DIRECTED, Disp: 1 each, Rfl: 3    lisinopril (ZESTRIL) 40 MG tablet, TAKE 1 TABLET (40 MG) BY MOUTH DAILY IN THE MORNING, Disp: 90 tablet, Rfl: 0    loperamide (IMODIUM) 2 MG capsule, Take 1 capsule (2 mg) by mouth 4 times daily as needed for diarrhea, Disp: 120 capsule, Rfl: 12    " magnesium oxide (MAG-OX) 400 MG tablet, Take 1 tablet (400 mg) by mouth 2 times daily, Disp: 180 tablet, Rfl: 3    Microlet Lancets MISC, CHECK BLOOD SUGAR FOUR TIMES DAILY E11.9, Disp: 400 each, Rfl: 1    montelukast (SINGULAIR) 10 MG tablet, Take 1 tablet (10 mg) by mouth every evening, Disp: 90 tablet, Rfl: 3    multivitamin, therapeutic (THERA-VIT) TABS, Take 1 tablet by mouth daily, Disp: 30 tablet, Rfl: 12    omeprazole (PRILOSEC) 20 MG DR capsule, Take 1 capsule (20 mg) by mouth 2 times daily (before meals) (Patient taking differently: Take 40 mg by mouth 2 times daily (before meals)), Disp: 180 capsule, Rfl: 3    order for DME, Equipment being ordered: diabetic shoes, Disp: 1 each, Rfl: 0    predniSONE (DELTASONE) 20 MG tablet, Take 3 tabs by mouth daily x 3 days, then 2 tabs daily x 3 days, then 1 tab daily x 3 days, then 1/2 tab daily x 3 days., Disp: 20 tablet, Rfl: 0    predniSONE (DELTASONE) 5 MG tablet, TAKE ONE TABLET BY MOUTH ONCE DAILY IN THE MORNING AND ONE-HALF TABLET IN THE EVENING, Disp: 45 tablet, Rfl: 12    rosuvastatin (CRESTOR) 40 MG tablet, Take 20 mg by mouth Every Mon, Wed, Fri Morning, Disp: , Rfl:     sildenafil (VIAGRA) 25 MG tablet, Take 1 tablet (25 mg) by mouth as needed (as needed), Disp: 32 tablet, Rfl: 11    tacrolimus (GENERIC EQUIVALENT) 0.5 MG capsule, Take 1 capsule (0.5 mg) by mouth daily Total dose: 3 mg in the AM and 3 mg in the PM ON HOLD 4/18/24 FOR DOSE ADJUSTMENTS, Disp: , Rfl:     tacrolimus (GENERIC EQUIVALENT) 1 MG capsule, Take 3 capsules (3 mg) by mouth every morning AND 3 capsules (3 mg) every evening. Total dose: 3 mg in AM and 3 mg in PM, Disp: 180 capsule, Rfl: 11     REVIEW OF SYSTEMS:  12-point ROS reviewed and negative other than that mentioned in HPI.     Objective  VITAL SIGNS:  /71   Pulse 94   Temp 98.8  F (37.1  C)   Resp 16   Ht 1.727 m (5' 8\")   Wt 73.9 kg (163 lb)   SpO2 95%   BMI 24.78 kg/m      ECOG PS: 1     PHYSICAL EXAM:  General: " Awake, alert, in no acute distress. Oriented x 3.  HEENT: Normocephalic, atraumatic. No scleral icterus.   Lymph: No cervical, supraclavicular, or axillary lymphadenopathy appreciated.   CV: Regular rate and rhythm. No murmurs, rubs, or gallops appreciated.  Resp: Good inspiratory effort, lungs clear to auscultation bilaterally.  GI: Abdomen soft, nontender, nondistended. No masses or organomegaly appreciated.   Ext: No peripheral edema bilaterally. Chronic venous stasis changes.   Neuro: CN II-XII grossly intact. No focal deficits.   Skin: No rash, unusual bruising or prominent lesions.  Psych: Pleasant, normal affect.    LABS:  I reviewed the following labs:  Lab Results   Component Value Date    WBC 12.9 (H) 04/17/2024    HGB 11.5 (L) 04/17/2024    HCT 36.5 (L) 04/17/2024     (H) 04/17/2024     04/17/2024    INR 1.03 03/21/2024    PTT 35 01/12/2024     Lab Results   Component Value Date     04/16/2024    POTASSIUM 4.5 04/16/2024    CR 1.04 04/16/2024    BUN 35.1 (H) 04/16/2024    CHLORIDE 108 (H) 04/16/2024    ERIN 8.6 (L) 04/16/2024    GLC 62 (L) 04/16/2024      Lab Results   Component Value Date    ALT 51 04/16/2024    AST 43 04/16/2024    ALKPHOS 47 04/16/2024    BILITOTAL 0.5 04/16/2024    BILIDIRECT 0.15 08/24/2015      Lab Results   Component Value Date     (H) 04/16/2024    URIC 5.9 04/16/2024     EBV PCR negative.    IMAGING:  3/22/24 EGD:  Impression:         -  Normal esophagus.          -  Normal stomach.          - The is a 5 cm segment of ulcerated and friable mucosa in the second portion             of the duodenum. There was no active bleeding and no lesions that appear to be             high risk. The area is too diffuse to be treated endoscopically. This was             biopsied. A clip was then placed in order to willian the area. This is the likely             source of bleeding          -  No specimens collected.     4/13/24 PET-CT:  IMPRESSION:  FDG avid wall thickening  of the distal duodenum near the ligament of Treitz suspicious for lymphomatous/PTLD involvement. No convincing additional sites of involvement identified with an additional segment of less intense FDG uptake in morphologically   normal-appearing small bowel in the left lower quadrant abdomen which is nonspecific and warrants attention on follow-up.    PATHOLOGY:  3/22/24 Duodenal biopsy (overread):  Duodenum, biopsies ([id number], obtained 03/22/2024):                - Tiny biopsy, most suggestive of diffuse large B cell lymphoma (DLBCL), germinal center subtype, EBV negative                - See comment    Though this patient have a history of solid organ transplant, this lesion appears ~ 11 years after transplantation and is EBV negative (may be a de jennifer lymphoma and not represent a PTLD).     Ancillary studies:  - FISH: Negative for rearrangement of MYC and BCL6. No fusion of MYC and IGH was found.    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 Biopsy shows DLBCL, germinal center subtype, EBV negative. FISH negative for MYC and BCL6 rearrangements. 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      We reviewed the overall diagnosis and prognosis of DLBCL/PTLD. Given his other significant comorbidities, will plan to start with single-agent rituximab weekly x 4 doses. His immunosuppression has already been reduced with stopping of azathioprine, which is also a key part of treatment. Response rate of single-agent rituximab may be lower for late-onset/EBV-negative PTLD; however he also has limited  disease burden so I think it is worth trying. We reviewed potential side effects including possible Rituximab infusion reaction the first cycle, infections, fatigue, GI symptoms. Because of the disease location, he is also at risk of bowel perforation and/or recurrent GI bleeding - advised pt to monitor closely for dizziness, abdominal pain, blood in the stools, etc.     Plan to obtain restaging PET a month after last dose of weekly rituximab. If CR, can move to surveillance. If NM, can consider consolidation with 4 more doses of rituximab spaced out to monthly. If no response/progression, will need to escalate to full chemotherapy, likely mini-R-CHOP.     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 Appreciate them stopping azathioprine already.  - Now just on tacrolimus and low-dose prednisone. Mgmt per Transplant team.     #CAD s/p staged PCI Jan ([facility]AN to LAD)/Feb 2024 (MEGHNA to RCA), on DAPT  #Peripheral vascular disease  Following with Cardiology and Vascular Medicine respectively. Was already on aspirin/Plavix for PAD when he developed unstable angina and was found to have multiple blockages. Had staged PCI.  - Continue aspirin and Plavix; monitor closely for recurrent GI bleeding.     #T2DM  Well controlled with HbA1c <4% on 4/3/24.  - Continue current insulin regimen.     #Hx of recurrent CMV viremia  Valganciclovir stopped 11/2023 due to cost. CMV PCR still negative as of 4/3/24.  - CMV monitoring per Transplant team.     #Non-melanoma skin cancer  Hx of SCC s/p Mohs in 2016. Recently found to have SCC of left temple and SCC/BCC hydrid of right facial cheek 2/6/24.   - Referred to Derm here for Mohs surgery, currently scheduled to see Dr. Wilson 5/15/24. Will message to see if there any concerns about doing surgery while on rituximab.     #Ppx  - ID ppx: start  mg BID.   - Vaccines: up to date on flu, COVID, RSV, pneumonia, Tdap vaccines. Only had one dose of  Shingrix in 2018 but had bad reaction.       PLAN:  - Start weekly rituximab x 4 doses (first dose at [facility] [place] [place] for later doses)  - MEETA visit with 2nd and 4th rituximab dose  - PET and follow up with me 1 month after last rituximab    Total of 60 minutes on patient visit, reviewing records, interpreting test results, placing orders, and documentation on the day of service.    The longitudinal plan of care for the diagnosis(es)/condition(s) as documented were addressed during this visit. Due to the added complexity in care, I will continue to support Aubrey in the subsequent management and with ongoing continuity of care.     Amber Drake MD  Attending Physician, [facility]

## 2024-04-18 NOTE — PROGRESS NOTES
[facility]               NEW PATIENT REFERRAL        Apr 18, 2024   Subjective   REFERRAL SOURCE: Alma Murphy MD    REASON FOR VISIT: New DLBCL/PTLD    HISTORY OF PRESENT ILLNESS:  Mr. Aubrey Duncan is a 68 year old male who presents for consultation regarding newly diagnosed DLBCL in setting of bilateral lung transplant in 2013.     He presented to [facility] on 3/21/24 with melena and acute blood loss anemia with Hgb down to 8.2 from baseline of 11-12. EGD revealed a 5 cm segment of ulcerated and friable mucosa in the second segment of the duodenum. There was no active bleeding and the area was too diffuse to be treated endoscopically, but the area was biopsied. Case was discussed with Cardiology who recommended to continue DAPT with aspirin and Plavix given recent stenting (2/16/24). Pathology now back showing diffuse large B-cell lymphoma, GCB-subtype, with Ki67 80-90%. JUNG DAKOTA is negative.     Aubrey is here today with his wife Kyung. He denies any further melena since initial episode. He has occasional abdominal pain but thinks it is related to gas. Otherwise no fevers, night sweats, SOB, chest pain, N/V, or other bleeding. Biggest issue is bilateral lower extremity pain/claudication related to PAD. Energy is pretty good but activity is limited by the claudication. Still doing daily activities like cooking. He continues on tacrolimus and low-dose prednisone for lung transplant immunosuppression but azathioprine was stopped with his PTLD diagnosis.     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  No hematologic malignancies that he is aware of.     SOCIAL HISTORY:  Former smoker, quit in 1986 (2 packs/day x 15 years). Denies alcohol use.  Lives in [place], [place].      Allergies   Allergen Reactions    Heparin Other (See Comments)     HIT positive and AUGUST positive    No Heparin Antibody Identified on 8/15 blood test    Oxycodone Other (See Comments)     Significant lethargy, confusion. Tolerates dilaudid well.     Fluocinolone Other (See Comments)     Tendon problems      Levaquin Muscle Pain (Myalgia)    Pneumococcal Vaccine Other (See Comments)     Other reaction(s): Fever  \"My arm swelled up like a balloon.\"    Varicella Zoster Immune Globulin Swelling       Current Outpatient Medications:     acetaminophen (TYLENOL) 325 MG tablet, Take 2 tablets (650 mg) by mouth every 6 hours as needed for mild pain, Disp: 60 tablet, Rfl: 0    albuterol (PROAIR HFA/PROVENTIL HFA/VENTOLIN HFA) 108 (90 Base) MCG/ACT inhaler, Inhale 1-2 puffs into the lungs every 6 hours as needed for shortness of breath or wheezing, Disp: 8.5 g, Rfl: 3    amLODIPine (NORVASC) 10 MG tablet, Take 1 tablet (10 mg) by mouth daily, Disp: 90 tablet, Rfl: 3    aspirin (ASA) 81 MG EC tablet, Take 1 tablet (81 mg) by mouth daily, Disp: 90 tablet, Rfl: 3    azithromycin (ZITHROMAX) 250 MG tablet, Take 250 mg by mouth Every Mon, Wed, Fri Morning, Disp: , Rfl:     blood glucose (NO BRAND SPECIFIED) test strip, USE TO TEST BLOOD SUGAR 3 TIMES DAILY. DIAG CODE: E11.9, Disp: 300 strip, Rfl: 0    Calcium Carbonate-Vitamin D 600-10 MG-MCG TABS, Take 1 tablet by mouth 2 times daily (with meals), Disp: 60 tablet, Rfl: 11    carvedilol (COREG) 6.25 MG tablet, TAKE 1 TABLET (6.25 MG) BY MOUTH 2 TIMES DAILY (WITH MEALS), Disp: 120 tablet, Rfl: 3    clopidogrel (PLAVIX) 75 MG tablet, Take 1 " tablet (75 mg) by mouth daily, Disp: 90 tablet, Rfl: 3    dapsone (ACZONE) 25 MG tablet, Take 2 tablets (50 mg) by mouth daily, Disp: 180 tablet, Rfl: 3    doxycycline hyclate (VIBRAMYCIN) 100 MG capsule, Take 1 capsule (100 mg) by mouth 2 times daily, Disp: 60 capsule, Rfl: 0    econazole nitrate 1 % external cream, APPLY TOPICALLY DAILY TO FEET AND HEELS., Disp: 85 g, Rfl: 3    fludrocortisone (FLORINEF) 0.1 MG tablet, Take 1 tablet (0.1 mg) by mouth daily, Disp: 90 tablet, Rfl: 3    fluticasone-salmeterol (ADVAIR-HFA) 230-21 MCG/ACT inhaler, Inhale 2 puffs into the lungs 2 times daily, Disp: 8 g, Rfl: 11    furosemide (LASIX) 20 MG tablet, Take 1 tablet (20 mg) by mouth daily, Disp: 90 tablet, Rfl: 4    gabapentin (NEURONTIN) 100 MG capsule, Take 2 capsules (200 mg) by mouth 2 times daily, Disp: 360 capsule, Rfl: 3    insulin aspart (NOVOLOG PEN) 100 UNIT/ML pen, Take 5 U am, 3 unit(s) non, 5 unit(s) pm of insulin within 30 minutes of start of breakfast, lunch, and dinner. Do not give if blood sugar is less than 70 mg/dl., Disp: , Rfl:     insulin glargine (LANTUS PEN) 100 UNIT/ML pen, Inject 18 Units Subcutaneous every morning (before breakfast), Disp: , Rfl:     insulin pen needle (32G X 4 MM) 32G X 4 MM miscellaneous, Use 4 pen needles daily or as directed. Dispense as insurance allows. Dx. Code: E09.9, Disp: 400 each, Rfl: 11    Lancet Devices (MICROLET NEXT LANCING DEVICE) MISC, USE AS DIRECTED, Disp: 1 each, Rfl: 3    lisinopril (ZESTRIL) 40 MG tablet, TAKE 1 TABLET (40 MG) BY MOUTH DAILY IN THE MORNING, Disp: 90 tablet, Rfl: 0    loperamide (IMODIUM) 2 MG capsule, Take 1 capsule (2 mg) by mouth 4 times daily as needed for diarrhea, Disp: 120 capsule, Rfl: 12    magnesium oxide (MAG-OX) 400 MG tablet, Take 1 tablet (400 mg) by mouth 2 times daily, Disp: 180 tablet, Rfl: 3    Microlet Lancets MISC, CHECK BLOOD SUGAR FOUR TIMES DAILY E11.9, Disp: 400 each, Rfl: 1    montelukast (SINGULAIR) 10 MG tablet, Take  "1 tablet (10 mg) by mouth every evening, Disp: 90 tablet, Rfl: 3    multivitamin, therapeutic (THERA-VIT) TABS, Take 1 tablet by mouth daily, Disp: 30 tablet, Rfl: 12    omeprazole (PRILOSEC) 20 MG DR capsule, Take 1 capsule (20 mg) by mouth 2 times daily (before meals) (Patient taking differently: Take 40 mg by mouth 2 times daily (before meals)), Disp: 180 capsule, Rfl: 3    order for DME, Equipment being ordered: diabetic shoes, Disp: 1 each, Rfl: 0    predniSONE (DELTASONE) 20 MG tablet, Take 3 tabs by mouth daily x 3 days, then 2 tabs daily x 3 days, then 1 tab daily x 3 days, then 1/2 tab daily x 3 days., Disp: 20 tablet, Rfl: 0    predniSONE (DELTASONE) 5 MG tablet, TAKE ONE TABLET BY MOUTH ONCE DAILY IN THE MORNING AND ONE-HALF TABLET IN THE EVENING, Disp: 45 tablet, Rfl: 12    rosuvastatin (CRESTOR) 40 MG tablet, Take 20 mg by mouth Every Mon, Wed, Fri Morning, Disp: , Rfl:     sildenafil (VIAGRA) 25 MG tablet, Take 1 tablet (25 mg) by mouth as needed (as needed), Disp: 32 tablet, Rfl: 11    tacrolimus (GENERIC EQUIVALENT) 0.5 MG capsule, Take 1 capsule (0.5 mg) by mouth daily Total dose: 3 mg in the AM and 3 mg in the PM ON HOLD 4/18/24 FOR DOSE ADJUSTMENTS, Disp: , Rfl:     tacrolimus (GENERIC EQUIVALENT) 1 MG capsule, Take 3 capsules (3 mg) by mouth every morning AND 3 capsules (3 mg) every evening. Total dose: 3 mg in AM and 3 mg in PM, Disp: 180 capsule, Rfl: 11     REVIEW OF SYSTEMS:  12-point ROS reviewed and negative other than that mentioned in HPI.     Objective   VITAL SIGNS:  /71   Pulse 94   Temp 98.8  F (37.1  C)   Resp 16   Ht 1.727 m (5' 8\")   Wt 73.9 kg (163 lb)   SpO2 95%   BMI 24.78 kg/m      ECOG PS: 1     PHYSICAL EXAM:  General: Awake, alert, in no acute distress. Oriented x 3.  HEENT: Normocephalic, atraumatic. No scleral icterus.   Lymph: No cervical, supraclavicular, or axillary lymphadenopathy appreciated.   CV: Regular rate and rhythm. No murmurs, rubs, or gallops " appreciated.  Resp: Good inspiratory effort, lungs clear to auscultation bilaterally.  GI: Abdomen soft, nontender, nondistended. No masses or organomegaly appreciated.   Ext: No peripheral edema bilaterally. Chronic venous stasis changes.   Neuro: CN II-XII grossly intact. No focal deficits.   Skin: No rash, unusual bruising or prominent lesions.  Psych: Pleasant, normal affect.    LABS:  I reviewed the following labs:  Lab Results   Component Value Date    WBC 12.9 (H) 04/17/2024    HGB 11.5 (L) 04/17/2024    HCT 36.5 (L) 04/17/2024     (H) 04/17/2024     04/17/2024    INR 1.03 03/21/2024    PTT 35 01/12/2024     Lab Results   Component Value Date     04/16/2024    POTASSIUM 4.5 04/16/2024    CR 1.04 04/16/2024    BUN 35.1 (H) 04/16/2024    CHLORIDE 108 (H) 04/16/2024    ERIN 8.6 (L) 04/16/2024    GLC 62 (L) 04/16/2024      Lab Results   Component Value Date    ALT 51 04/16/2024    AST 43 04/16/2024    ALKPHOS 47 04/16/2024    BILITOTAL 0.5 04/16/2024    BILIDIRECT 0.15 08/24/2015      Lab Results   Component Value Date     (H) 04/16/2024    URIC 5.9 04/16/2024     EBV PCR negative.    IMAGING:  3/22/24 EGD:  Impression:         -  Normal esophagus.          -  Normal stomach.          - The is a 5 cm segment of ulcerated and friable mucosa in the second portion             of the duodenum. There was no active bleeding and no lesions that appear to be             high risk. The area is too diffuse to be treated endoscopically. This was             biopsied. A clip was then placed in order to willian the area. This is the likely             source of bleeding          -  No specimens collected.     4/13/24 PET-CT:  IMPRESSION:  FDG avid wall thickening of the distal duodenum near the ligament of Treitz suspicious for lymphomatous/PTLD involvement. No convincing additional sites of involvement identified with an additional segment of less intense FDG uptake in morphologically    normal-appearing small bowel in the left lower quadrant abdomen which is nonspecific and warrants attention on follow-up.    PATHOLOGY:  3/22/24 Duodenal biopsy (overread):  Duodenum, biopsies ([id number], obtained 03/22/2024):                - Tiny biopsy, most suggestive of diffuse large B cell lymphoma (DLBCL), germinal center subtype, EBV negative                - See comment    Though this patient have a history of solid organ transplant, this lesion appears ~ 11 years after transplantation and is EBV negative (may be a de jennifer lymphoma and not represent a PTLD).     Ancillary studies:  - FISH: Negative for rearrangement of MYC and BCL6. No fusion of MYC and IGH was found.    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 Biopsy shows DLBCL, germinal center subtype, EBV negative. FISH negative for MYC and BCL6 rearrangements. 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      We reviewed the overall diagnosis and prognosis of DLBCL/PTLD. Given his other significant comorbidities, will plan to start with single-agent rituximab weekly x 4 doses. His immunosuppression has already been reduced with stopping of azathioprine, which is also a key part of treatment. Response rate of single-agent rituximab may be lower for late-onset/EBV-negative PTLD; however he also has limited disease burden so I think it is worth trying. We reviewed potential side effects including possible Rituximab infusion reaction the first cycle, infections, fatigue, GI symptoms. Because of the disease location, he is also at  risk of bowel perforation and/or recurrent GI bleeding - advised pt to monitor closely for dizziness, abdominal pain, blood in the stools, etc. He is agreeable to proceed.     Plan to obtain restaging PET a month after last dose of weekly rituximab. If CR, can move to surveillance. If ND, can consider consolidation with 4 more doses of rituximab spaced out to monthly. If no response/progression, will need to escalate to full chemotherapy, likely mini-R-CHOP.     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 Appreciate them stopping azathioprine already.  - Now just on tacrolimus and low-dose prednisone. Mgmt per Transplant team.     #CAD s/p staged PCI Jan (MEGHNA to LAD)/Feb 2024 (MEGHNA to RCA), on DAPT  #Peripheral vascular disease  Following with Cardiology and Vascular Medicine respectively. Was already on aspirin/Plavix for PAD when he developed unstable angina and was found to have multiple blockages. Had staged PCI.  - Continue aspirin and Plavix; monitor closely for recurrent GI bleeding.     #T2DM  Well controlled with HbA1c <4% on 4/3/24.  - Continue current insulin regimen.     #Hx of recurrent CMV viremia  Valganciclovir stopped 11/2023 due to cost. CMV PCR still negative as of 4/3/24.  - CMV monitoring per Transplant team.     #Non-melanoma skin cancer  Hx of SCC s/p Mohs in 2016. Recently found to have SCC of left temple and SCC/BCC hydrid of right facial cheek 2/6/24.   - Referred to Derm here for Mohs surgery, currently scheduled to see Dr. Wilson 5/15/24. Will message to see if there any concerns about doing surgery while on rituximab.     #Ppx  - ID ppx: start  mg BID.   - Vaccines: up to date on flu, COVID, RSV, pneumonia, Tdap vaccines. Only had one dose of Shingrix in 2018 but had bad reaction.       PLAN:  - Start weekly rituximab x 4 doses (first dose at [facility] [place] [place] for later doses)  - MEETA visit with 2nd and 4th rituximab dose  - PET and  follow up with me 1 month after last rituximab    Total of 60 minutes on patient visit, reviewing records, interpreting test results, placing orders, and documentation on the day of service.    The longitudinal plan of care for the diagnosis(es)/condition(s) as documented were addressed during this visit. Due to the added complexity in care, I will continue to support Aubrey in the subsequent management and with ongoing continuity of care.     Amber Drake MD  Attending Physician, [facility]

## 2024-04-18 NOTE — NURSING NOTE
"Oncology Rooming Note    April 18, 2024 11:52 AM   Aubrey Duncan is a 68 year old male who presents for:    Chief Complaint   Patient presents with    Oncology Clinic Visit     S/P lung transplant      Initial Vitals: /71   Pulse 94   Temp 98.8  F (37.1  C)   Resp 16   Ht 1.727 m (5' 8\")   Wt 73.9 kg (163 lb)   SpO2 95%   BMI 24.78 kg/m   Estimated body mass index is 24.78 kg/m  as calculated from the following:    Height as of this encounter: 1.727 m (5' 8\").    Weight as of this encounter: 73.9 kg (163 lb). Body surface area is 1.88 meters squared.  No Pain (0) Comment: Data Unavailable   No LMP for male patient.  Allergies reviewed: Yes  Medications reviewed: Yes    Medications: Medication refills not needed today.  Pharmacy name entered into EPIC:    [id number] - [place], [place] - [address]  [facility] - [place] - [address]  [id number] - [place], [place] - [address]  [id number] - [place] - [address]  [facility] - [place], [place] - [address]  [facility] - [place], [place] - [address]    Frailty Screening:   Is the patient here for a new oncology consult visit in cancer care? 1. Yes. Over the past month, have you experienced difficulty or required a caregiver to assist with:   1. Balance, walking or general mobility (including any falls)? NO  2. Completion of self-care tasks such as bathing, dressing, toileting, grooming/hygiene?  NO  3. Concentration or memory that affects your daily life?  NO       Clinical concerns: no other complaints         Sahil Cristina"

## 2024-04-18 NOTE — PROGRESS NOTES
[facility]: Cancer Care Initial Note                                    Discussion with Patient:                                                      Met with Aubrey after office visit/consult with Dr. Broussard. Introduced self as RN Care Coordinator. Went over contact information for [facility]. Discussed roles of RNCC, MD, MEETA, nurse triage line, and clinic coordinators. Discussed how to get a hold of different team members via [employer] and at [phone]. Authorization to discuss information already in chart, verified that it is current.        Goals          General     Monitoring (pt-stated)      Notes - Note created  4/18/2024 12:11 PM by Yohana Justice RN     Goal Statement: I will use my clinic and care team resources as directed.   Date Goal set: 4/18/2024  Barriers: multiple diagnoses  Strengths: support  Date to Achieve By: ongoing  Patient expressed understanding of goal:  Yes   Action steps to achieve this goal:  I will contact triage with new, worsening, or uncontrolled symptoms.   I will contact triage with temperature over 100.4  I will call with difficulties of scheduling and/or transportation.  I will request needed refills when there are 7 days of medication remaining.   I will not send urgent or symptomatic messages through [employer].  I will contact scheduling to arrange or make changes in my appointments.                Assessment:                                                      Initial  Current living arrangement:: I live in a private home with family  Type of residence:: Private home - no stairs (ramp to get in and out of the house)  Informal Support system:: Family  Equipment Currently Used at Home: none  Bed or wheelchair confined:: No  Mobility Status: Independent  Transportation means:: Regular car  Medication adherence problem (GOAL):: No  Knowledgeable about how to use meds:: Yes  Medication side effects suspected:: No    Plan of Care Education Review:   Assessment  "completed with:: Patient;Spouse or significant other    Plan of Care Education   Yearly learning assessment completed?: Yes (see Education tab)  Diagnosis:: DLBCL  Does patient understand diagnosis?: Yes  Tx plan/regimen:: Rituximab  Does patient understand treatment plan/regimen?: Yes  Preparing for treatment:: Reviewed treatment preparation information with patient (vascular access, day of chemo, visitor policy, what to bring, etc.)  Vascular access education provided for:: Peripheral IV  Side effect education:: Fatigue;Infection (infection reaction)  Safety/self care at home reviewed with patient:: Yes  Coping - concerns/fears reviewed with patient:: Yes  Plan of Care:: MEETA follow-up appointment;Treatment schedule  When to call provider:: Bleeding;Uncontrolled nausea/vomiting;New/worsening pain;Increased shortness of breath;Shaking chills;Temperature >100.4F;Uncontrolled diarrhea/constipation  Reasons for deferring treatment reviewed with patient:: Yes    Evaluation of Learning  Patient Education Provided: Yes  Readiness:: Acceptance  Method:: Booklet/Handout;Explanation  Response:: Verbalizes understanding           Intervention/Education provided during outreach:                                                       [place] of [place]. Can stay with his daughter prior to clinic appointments who lives about 1.5 hours north. Preference would be to get treatments at [facility] if able.     Met  with Aubrey to discuss chemotherapy and resources available at the [facility]. Provided Aubrey with \"My Cancer Guidebook\" and [facility] printout(s) on Rituximab. Reviewed administration, side effects and ongoing symptom management by APPs in clinic.  Highlighted steps to expect when getting treatment (check in, labs, pre-medications, infusion). Reviewed chemotherapy safety guidelines.    Reviewed when to contact triage team and provided info sheet on \"When to Call For " "Help\", as well as triage contact information. Request Aubrey not send symptomatic MyChart messages.     Plan for first dose of Rituximab at [facility]. [facility] denied [facility] and stated he would stay with his daughter in [place].   Will plan to continue rituximab infusion at [facility].     Answered all Aubrey's questions. Encouraged him to reach out with any follow up questions or concerns.    Patient to follow up as scheduled at next appt  Patient to call/groopifyhart message with updates  Confirmed patient has clinic and triage numbers    Signature:  Yohana Justice RN  "

## 2024-04-18 NOTE — TELEPHONE ENCOUNTER
Called patient to discuss follow up EUS, per Dr. Cortez    Call to schedule EUS mid May, amended Ac hold- hold plavix 1 day prior and continue ASA     Patient in clinic visit, will call him back    ML

## 2024-04-18 NOTE — TELEPHONE ENCOUNTER
[facility] sent Rx request for the following:      Requested Prescriptions   Pending Prescriptions Disp Refills    amLODIPine (NORVASC) 10 MG tablet [Pharmacy Med Name: AMLODIPINE 10MG TABLET] 90 tablet 3     Sig: TAKE 1 TABLET (10 MG) BY MOUTH DAILY     Last Prescription Date:   6/8/23  Last Fill Qty/Refills:         90, R-3    Last Office Visit:              3/28/24   Future Office visit:           none    Prescription approved per [facility] Refill Protocol.  Jelly Paz RN on 4/18/2024 at 4:07 PM

## 2024-04-19 ENCOUNTER — VIRTUAL VISIT (OUTPATIENT)
Dept: VASCULAR SURGERY | Facility: CLINIC | Age: 69
End: 2024-04-19
Payer: MEDICARE

## 2024-04-19 VITALS
HEIGHT: 68 IN | WEIGHT: 157 LBS | BODY MASS INDEX: 23.79 KG/M2 | HEART RATE: 79 BPM | DIASTOLIC BLOOD PRESSURE: 76 MMHG | SYSTOLIC BLOOD PRESSURE: 143 MMHG

## 2024-04-19 DIAGNOSIS — I70.212 ATHEROSCLEROSIS OF NATIVE ARTERIES OF EXTREMITIES WITH INTERMITTENT CLAUDICATION, LEFT LEG (H): ICD-10-CM

## 2024-04-19 DIAGNOSIS — I70.219 INTERMITTENT CLAUDICATION DUE TO ATHEROSCLEROSIS OF ARTERY OF EXTREMITY (H): Primary | ICD-10-CM

## 2024-04-19 PROCEDURE — 99214 OFFICE O/P EST MOD 30 MIN: CPT | Mod: 95 | Performed by: HOSPITALIST

## 2024-04-19 ASSESSMENT — PAIN SCALES - GENERAL: PAINLEVEL: NO PAIN (0)

## 2024-04-19 NOTE — PROGRESS NOTES
VASCULAR MEDICINE CONSULT NOTE          LOCATION:  [facility] [place]        Date of Service: 4/19/2024      Primary Care Provider: Hoa Olivarez  Referring provider;  No ref. provider found      Reason for the visit/chief complaint:   Follow up on PAD    Virtual Visit Details    Type of service:  Video Visit   Video Start Time:  08:05 AM  Video End Time: 08:40 AM    Originating Location (pt. Location): Home    Distant Location (provider location):  On-site  Platform used for Video Visit: [facility]    HPI:  Aubrey Duncan is a pleasant 68 year old male whom I am contacting through video to follow-up on PAD.  He is accompanied by his wife Kyung.    We first met 11/17/2023.  Multiple events happened since then including chest pain status post MEGHNA x 1 to LAD and new diagnosis of diffuse large B-cell lymphoma.  During our last visit visit, we went through his previous medical history specifically his PAD and venous thromboembolic history. Thromboembolic history is actually being followed up by Hematology Dr. Cogan.    With regards to PAD, he is known to have right intermittent claudication that was resistant to walking program and conservative management back in 2022 leading to right lower extremity arteriogram with Dr. Boyd with attempted intervention 8/16/2022 however, this was found to be unreconstructable with multiple collaterals.  Medical management was subsequently recommended.  During our last visit, he was having right lower extremity intermittent claudication that was stable.  His TC at that time showed 0.6 on the right 10.8 on the left.  We referred him to supervised walking program that he started 11/28/23 and per patient felt much improved in terms of increasing walking distance.    Before completing the program and on 1/9/2024, he went to the emergency department with chest pain that eventually led to him transferring to [facility] for angiogram for unstable  angina status post  MEGHNA x1 to LAD  1/11/24 with plan to return for staged PCI of RCA in 3-4 weeks.  Note that he was already on DAPT prior to his cardiac stents and this was continued with aspirin 81 mg and clopidogrel 75 mg.    He actually completed the supervised walking program of PAD after his first cardiac stenting with plan to start cardiac rehab after his second staged stents.  On 2/16/2024, patient had MEGHNA x1-RCA. After that, he started cardiac rehab.    5 weeks after 3/29/2024, patient was admitted to the hospital again this time with melena and acute blood loss anemia.  Hemoglobin dropped from 11-8.2.  GI was consulted and EGD revealed 5 cm segment of ulcerated and friable mucosa in the second segment of the duodenum.  After discussion with cardiology, his DAPT was restarted due to the recent multiple stenting and should not be interrupted for 12 months unless there is life-threatening bleeding.    Likely, since then he had no further bleeding, melena or hematemesis.    During his cardiac rehab, he started noticing a new left lower extremity pain with walking. This was new compared to his known claudication previously on the right.    TC study was done 4/4 now showing non compressible vessels bilaterally (TC was 0.6 and 0.8 on the right and left respectively 6 months ago). TBI was 0.19 on the right (small wound in the first toe) and 0.31 on the left. Doppler waveforms showing infrapopliteal disease. Post exercise: No signal or pressure able to be obtained until 5 minutes after exercise on the right and 7 minutes on the left.  No symptoms during his exercise on the right.  Left calf pain on the left starting at 3 minutes.  Of note, arterial duplex from 6 months ago showed near occlusion of the proximal left SFA with velocity 441 cm/s (after the near occlusion) which was newly reported at that time but was asymptomatic on the left.    No pain at rest.  Has had few sores on his feet and has been following  up with podiatry Dr. Olsen.  Reviewing the most recent note from podiatry 4/4, he has bilateral fifth toe ulcer and right hallux ulcer that was suspicious for gout versus arterial ulcer versus polyps.  Per patient, his wounds are very small and almost healed up.        REVIEW OF SYSTEMS:    A 12 point ROS was reviewed and is negative except what is mentioned in HPI.       Past medical history, surgical history, medications, family history, social history and allergies were reviewed. Pertinent points mentioned under HPI.           OBJECTIVE:    DIAGNOSTIC STUDIES:   Labs and diagnostics reviewed including outside records. Pertinent points are mentioned under HPI and assessment and plan sections.        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    Mr. Duncan has had multiple new health issues since we last met 6 months ago as detailed above including cardiac stents and new diagnosis of diffuse B-cell lymphoma planning to start immunotherapy soon.    Most importantly, he also had a new PAD symptoms on the left lower extremity.  Recall that prior, he only had intermittent claudication on the right that were stable and we  continued medical management.  He completed the supervised walking program that we referred him to and felt much better and was able to significantly increase his walking distance per his report.  However, now he has a new left-sided intermittent claudication.  Based on the discussion with him above, his claudication is not lifestyle limiting at this point.  The only concern is that he had small sores in his feet bilaterally.  There is concern that these are due to gout rather than arterial ulcers.  Unfortunately, with our video visit today I was unable to examine his feet.  I reviewed the most recent images taken from podiatry/Dr. Rosales but these are over 2 weeks ago.  Per patient's report, these seem to be healing and seems to be more consistent with gout based on his understanding.    Unfortunately, his TC study reveals now noncompressible vessels.  His TBI however remains slightly better on the left 0.31 and 0.19 on the right.  Toe pressure on the left is 44 mmHg.  Based on this, he should have at least moderate healing capacity.  He has an appointment with Dr. Rosales in 2 weeks.  I think I will get bilateral arterial duplex ultrasound first to reevaluate his previously seen left SFA stenosis and if any new hemodynamically significant stenoses are found.  Meanwhile, I encouraged him to continue with his walking program on the current medical therapy that is appropriate.      Recommendations:  Obtain arterial duplex ultrasound of bilateral lower extremities.  Patient is coming down here to see oncology within the next week and will be able to get it.  We will be in touch with him soon for in person visit to further delineate his lower extremity ulcers if truly concerning for arterial versus gout and assess if intervention is necessary.  We will arrange with in person follow-up with our vascular surgery colleagues.  Encouraged to continue the walking program that he has been doing.  Continue current medical  therapy.  He is on DAPT for recent cardiac stents no concerns for bleeding anymore.  Continue high intensity statin LDL at goal 47.      It was a pleasure meeting with Mr. Duncan and his wife through our video visit today.    Derick Carballo MD  Vascular Medicine  April 19, 2024

## 2024-04-19 NOTE — LETTER
4/19/2024       RE: Aubrey Duncan  [address]  [place] [address]     Dear Colleague,    Thank you for referring your patient, Aubrey Duncan, to the [facility] [place] at [facility]. Please see a copy of my visit note below.    Virtual Visit Details    Type of service:  Video Visit       [facility]      Type of Visit: Virtual: Damion    Patient is here for a return visit to discuss  PAD follow up .     Vitals - Patient Reported  Pain Score: No Pain (0)    Questions patient would like addressed today are: NA    Refills are needed: No    How would you like to obtain your AVS? Sundeep Cordoba MA    VASCULAR MEDICINE CONSULT NOTE          LOCATION:  [facility] CL[facility]        Date of Service: 4/19/2024      Primary Care Provider: Hoa Olivarez  Referring provider;  No ref. provider found      Reason for the visit/chief complaint:   Follow up on PAD    Virtual Visit Details    Type of service:  Video Visit   Video Start Time:  08:05 AM  Video End Time: 08:40 AM    Originating Location (pt. Location): Home    Distant Location (provider location):  On-site  Platform used for Video Visit: Robert    HPI:  Aubrey Duncan is a pleasant 68 year old male whom I am contacting through video to follow-up on PAD.  He is accompanied by his wife Kyung.    We first met 11/17/2023.  Multiple events happened since then including chest pain status post MEGHNA x 1 to LAD and new diagnosis of diffuse large B-cell lymphoma.  During our last visit visit, we went through his previous medical history specifically his PAD and venous thromboembolic history. Thromboembolic history is actually being followed up by Hematology Dr. Cogan.    With regards to PAD, he is known to have right intermittent claudication that was resistant to walking program and conservative management back in 2022 leading to right  lower extremity arteriogram with Dr. Boyd with attempted intervention 8/16/2022 however, this was found to be unreconstructable with multiple collaterals.  Medical management was subsequently recommended.  During our last visit, he was having right lower extremity intermittent claudication that was stable.  His TC at that time showed 0.6 on the right 10.8 on the left.  We referred him to supervised walking program that he started 11/28/23 and per patient felt much improved in terms of increasing walking distance.    Before completing the program and on 1/9/2024, he went to the emergency department with chest pain that eventually led to him transferring to [facility] for angiogram for unstable angina status post  MEGHNA x1 to LAD  1/11/24 with plan to return for staged PCI of RCA in 3-4 weeks.  Note that he was already on DAPT prior to his cardiac stents and this was continued with aspirin 81 mg and clopidogrel 75 mg.    He actually completed the supervised walking program of PAD after his first cardiac stenting with plan to start cardiac rehab after his second staged stents.  On 2/16/2024, patient had MEGHNA x1-RCA. After that, he started cardiac rehab.    5 weeks after 3/29/2024, patient was admitted to the hospital again this time with melena and acute blood loss anemia.  Hemoglobin dropped from 11-8.2.  GI was consulted and EGD revealed 5 cm segment of ulcerated and friable mucosa in the second segment of the duodenum.  After discussion with cardiology, his DAPT was restarted due to the recent multiple stenting and should not be interrupted for 12 months unless there is life-threatening bleeding.    Likely, since then he had no further bleeding, melena or hematemesis.    During his cardiac rehab, he started noticing a new left lower extremity pain with walking. This was new compared to his known claudication previously on the right.    TC study was done 4/4 now showing non compressible vessels bilaterally (TC was 0.6 and  0.8 on the right and left respectively 6 months ago). TBI was 0.19 on the right (small wound in the first toe) and 0.31 on the left. Doppler waveforms showing infrapopliteal disease. Post exercise: No signal or pressure able to be obtained until 5 minutes after exercise on the right and 7 minutes on the left.  No symptoms during his exercise on the right.  Left calf pain on the left starting at 3 minutes.  Of note, arterial duplex from 6 months ago showed near occlusion of the proximal left SFA with velocity 441 cm/s (after the near occlusion) which was newly reported at that time but was asymptomatic on the left.    No pain at rest.  Has had few sores on his feet and has been following up with podiatry Dr. Olsen.  Reviewing the most recent note from podiatry 4/4, he has bilateral fifth toe ulcer and right hallux ulcer that was suspicious for gout versus arterial ulcer versus polyps.  Per patient, his wounds are very small and almost healed up.        REVIEW OF SYSTEMS:    A 12 point ROS was reviewed and is negative except what is mentioned in HPI.       Past medical history, surgical history, medications, family history, social history and allergies were reviewed. Pertinent points mentioned under HPI.           OBJECTIVE:    DIAGNOSTIC STUDIES:   Labs and diagnostics reviewed including outside records. Pertinent points are mentioned under HPI and assessment and plan sections.        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    Mr. Duncan has had multiple new health issues since we last met 6 months ago as detailed above including cardiac stents and new diagnosis of diffuse B-cell lymphoma planning to start immunotherapy soon.    Most importantly, he also had a new PAD symptoms on the left lower extremity.  Recall that prior, he only had intermittent claudication on the right that were stable and we continued medical management.  He completed the supervised walking program that we referred him to and felt much better and was able to significantly increase his walking distance per his report.  However, now he has a new left-sided intermittent claudication.  Based on the discussion with him above, his claudication is not lifestyle limiting at this point.  The only concern is that he had small sores in his feet bilaterally.  There is concern that these are due to gout rather than arterial ulcers.  Unfortunately, with our video visit today I was unable to examine his feet.  I reviewed the most recent images taken from podiatry/Dr. Rosales but these are over 2 weeks ago.  Per patient's report, these seem to be healing and seems to be more consistent with gout based on his understanding.    Unfortunately, his TC study reveals now noncompressible vessels.  His TBI however remains slightly better on the left 0.31 and 0.19 on the right.  Toe pressure on the left is 44 mmHg.  Based on this, he should have at least moderate healing capacity.  He has an appointment with Dr. Rosales in 2 weeks.  I think I will get bilateral arterial duplex ultrasound first to reevaluate his previously seen left SFA stenosis and if any new hemodynamically  significant stenoses are found.  Meanwhile, I encouraged him to continue with his walking program on the current medical therapy that is appropriate.      Recommendations:  Obtain arterial duplex ultrasound of bilateral lower extremities.  Patient is coming down here to see oncology within the next week and will be able to get it.  We will be in touch with him soon for in person visit to further delineate his lower extremity ulcers if truly concerning for arterial versus gout and assess if intervention is necessary.  We will arrange with in person follow-up with our vascular surgery colleagues.  Encouraged to continue the walking program that he has been doing.  Continue current medical therapy.  He is on DAPT for recent cardiac stents no concerns for bleeding anymore.  Continue high intensity statin LDL at goal 47.      It was a pleasure meeting with Mr. Duncan and his wife through our video visit today.          Again, thank you for allowing me to participate in the care of your patient.      Sincerely,    Derick Carballo MD

## 2024-04-19 NOTE — NURSING NOTE
Is the patient currently in the state of [place]? YES    Visit mode:VIDEO    If the visit is dropped, the patient can be reconnected by: VIDEO VISIT: Send to e-mail at: [email or web address]    Will anyone else be joining the visit? Pts wife Kyung  (If patient encounters technical issues they should call [phone])    How would you like to obtain your AVS? MyChart    Are changes needed to the allergy or medication list? No    Patient denies any changes since echeck-in completion and states all information entered during echeck-in remains accurate.    Are refills needed on medications prescribed by this physician? NO    Reason for visit: RECHECK (Pt states records in chart from yesterdays oncology visit)    Phoebe Cordoba MA VVF

## 2024-04-19 NOTE — PROGRESS NOTES
Virtual Visit Details    Type of service:  Video Visit       [facility]      Type of Visit: Virtual: Dam[place]    Patient is here for a return visit to discuss  PAD follow up .     Vitals - Patient Reported  Pain Score: No Pain (0)    Questions patient would like addressed today are: NA    Refills are needed: No    How would you like to obtain your AVS? Sundeep Cordoba MA

## 2024-04-19 NOTE — PATIENT INSTRUCTIONS
Thank you so much for choosing us for your care. It was a pleasure to see you at the vascular clinic today.     Follow-up recommendations: Based on results of testing.    Additional testing/imaging ordered today: Complete lower extremity arterial ultrasound at your convenience.       Our scheduling team will get in touch with you to set up any follow-up testing/imaging and/or appointments. Please be aware that any testing/imaging recommended today will need to completed prior to your next visit with the provider. If testing/imaging is not completed prior to your next visit, your visit may be rescheduled.     If you have any questions, please contact our clinic directly at [phone] and ask for the nurse. We also encourage the use of [employer] to communicate with your healthcare provider.    If you have an urgent need after business hours (8:00 am to 4:30 pm) please call [phone], option 4, and ask for the vascular attending on call. For non-urgent after hours needs, please call the vascular clinic at [phone]. For scheduling needs, please call our clinic directly at [phone].    =====================================================================    [facility] is recognized by the [facility] as a comprehensive stroke center. As part of our commitment to better patient outcomes and excellent stroke education, we attach the below stroke education materials to ALL of the after visit summaries in our vascular clinic.        Learning About BE FAST: Stroke Warning Signs  BE FAST is a simple way to remember the main symptoms of stroke. These symptoms happen suddenly. So learning what to look for helps you know when to call for medical help. BE FAST stands for:    B - Balance.  Loss of balance or trouble walking.     E - Eyes.  Trouble seeing out of one or both eyes.     F - Face.  Weakness or drooping on one side of the face.     A - Arm.  Weakness or numbness in an arm or  leg.     S - Speech.  Trouble speaking.     T - Time to call 911.  Also call 911 if you have other stroke symptoms. They include:  Sudden confusion.  Sudden trouble understanding simple statements.  Fainting.  A seizure.  A sudden, severe headache.     A stroke happens when a blood vessel in the brain bursts or is blocked by a blood clot. The blood supply to part of the brain--and the oxygen the blood carries--is reduced. This damages the brain.   If you have a stroke, quick treatment may save your life. And it may reduce the damage in your brain so that you have fewer problems after the stroke.   Current as of: December 18, 2022               Content Version: 13.8    8075-2244 "[employer].   Care instructions adapted under license by your healthcare professional. If you have questions about a medical condition or this instruction, always ask your healthcare professional. "[employer] disclaims any warranty or liability for your use of this information.    Learning About How to Prevent a Stroke  What is a stroke?  A stroke is damage to the brain that occurs when a blood vessel in the brain bursts or is blocked by a blood clot. Without blood and the oxygen it carries, part of the brain starts to die. The part of the body controlled by the damaged area of the brain can't work properly.  Brain damage can start within minutes of a stroke. But quick treatment can help limit the damage and increase the chance of a full recovery.  What puts you at risk for stroke?  A risk factor is anything that makes you more likely to have a particular health problem.  Risk factors for stroke that you can manage or change include:  Health problems like atrial fibrillation, diabetes, high blood pressure, high cholesterol, hardening of the arteries (atherosclerosis), and sickle cell disease.  Smoking.  Drinking more than 2 alcoholic drinks a day for men and 1 drink a day for women.  Being overweight.  Not eating  healthy foods.  Not getting enough physical activity.  Risk factors you can't change include:  Having a previous stroke.  Family history of stroke.  Being older.  Being , [place] Native, , or South  American.  Being female.  Having certain problems during pregnancy, such as preeclampsia.  Being past menopause.  Your doctor can help you know your risk. Then you and your doctor can talk about whether to take steps to lower it.  How can you help prevent a stroke?  Here are some things you can do to help prevent a stroke.  Manage health problems that raise your risk. These include atrial fibrillation, diabetes, high blood pressure, and high cholesterol.  Have a heart-healthy lifestyle.  Don't smoke. If you need help quitting, talk to your doctor about stop-smoking programs and medicines. These can increase your chances of quitting for good.  Limit alcohol to 2 drinks a day for men and 1 drink a day for women.  Stay at a healthy weight. Lose weight if you need to.  Be active. Get at least 30 minutes of exercise on most days of the week. Walking is a good choice. You also may want to do other activities, such as running, swimming, cycling, or playing tennis or team sports.  Eat heart-healthy foods. These include vegetables, fruits, nuts, beans, lean meat, fish, and whole grains. Limit sodium and sugar.  If you think you may have a problem with alcohol or drug use, talk to your doctor.  If you use hormone therapy for menopause or hormonal birth control, talk with your doctor. Ask if these are right for you. They may raise the risk of stroke in some people.  Decide with your doctor whether you will also take medicines to help lower your risk. For example, you and your doctor may decide you will take a medicine that prevents blood clots.  What are the symptoms of a stroke?  Symptoms of a stroke happen quickly. A stroke may cause:  Sudden numbness, tingling, weakness, or loss of movement in  your face, arm, or leg, especially on only one side of your body.  Sudden vision changes.  Sudden trouble speaking.  Sudden confusion or trouble understanding simple statements.  Sudden problems with walking or balance.  A sudden, severe headache that is different from past headaches.  Fainting.  A seizure.  It's important to call for medical help if you have stroke symptoms. Quick treatment may save your life. And it may reduce the damage in your brain so that you have fewer problems after the stroke.  Follow-up care is a key part of your treatment and safety. Be sure to make and go to all appointments, and call your doctor if you are having problems. It's also a good idea to know your test results and keep a list of the medicines you take.    Current as of: December 18, 2022               Content Version: 13.8    1480-9216 [employer].   Care instructions adapted under license by your healthcare professional. If you have questions about a medical condition or this instruction, always ask your healthcare professional. [employer] disclaims any warranty or liability for your use of this information.

## 2024-04-21 ENCOUNTER — MYC MEDICAL ADVICE (OUTPATIENT)
Dept: PODIATRY | Facility: CLINIC | Age: 69
End: 2024-04-21
Payer: MEDICARE

## 2024-04-22 RX ORDER — METHYLPREDNISOLONE SODIUM SUCCINATE 125 MG/2ML
125 INJECTION, POWDER, LYOPHILIZED, FOR SOLUTION INTRAMUSCULAR; INTRAVENOUS
Status: CANCELLED
Start: 2024-05-02

## 2024-04-22 RX ORDER — EPINEPHRINE 1 MG/ML
0.3 INJECTION, SOLUTION INTRAMUSCULAR; SUBCUTANEOUS EVERY 5 MIN PRN
Status: CANCELLED | OUTPATIENT
Start: 2024-04-25

## 2024-04-22 RX ORDER — METHYLPREDNISOLONE SODIUM SUCCINATE 125 MG/2ML
125 INJECTION, POWDER, LYOPHILIZED, FOR SOLUTION INTRAMUSCULAR; INTRAVENOUS
Status: CANCELLED
Start: 2024-05-16

## 2024-04-22 RX ORDER — MEPERIDINE HYDROCHLORIDE 25 MG/ML
25 INJECTION INTRAMUSCULAR; INTRAVENOUS; SUBCUTANEOUS
Status: CANCELLED
Start: 2024-05-02

## 2024-04-22 RX ORDER — METHYLPREDNISOLONE SODIUM SUCCINATE 125 MG/2ML
125 INJECTION, POWDER, LYOPHILIZED, FOR SOLUTION INTRAMUSCULAR; INTRAVENOUS
Status: CANCELLED
Start: 2024-04-25

## 2024-04-22 RX ORDER — EPINEPHRINE 1 MG/ML
0.3 INJECTION, SOLUTION INTRAMUSCULAR; SUBCUTANEOUS EVERY 5 MIN PRN
Status: CANCELLED | OUTPATIENT
Start: 2024-05-02

## 2024-04-22 RX ORDER — ALBUTEROL SULFATE 90 UG/1
1-2 AEROSOL, METERED RESPIRATORY (INHALATION)
Status: CANCELLED
Start: 2024-05-09

## 2024-04-22 RX ORDER — DIPHENHYDRAMINE HCL 25 MG
50 CAPSULE ORAL ONCE
Status: CANCELLED
Start: 2024-05-16

## 2024-04-22 RX ORDER — EPINEPHRINE 1 MG/ML
0.3 INJECTION, SOLUTION INTRAMUSCULAR; SUBCUTANEOUS EVERY 5 MIN PRN
Status: CANCELLED | OUTPATIENT
Start: 2024-05-09

## 2024-04-22 RX ORDER — ACETAMINOPHEN 325 MG/1
650 TABLET ORAL ONCE
Status: CANCELLED
Start: 2024-05-02

## 2024-04-22 RX ORDER — MEPERIDINE HYDROCHLORIDE 25 MG/ML
25 INJECTION INTRAMUSCULAR; INTRAVENOUS; SUBCUTANEOUS EVERY 30 MIN PRN
Status: CANCELLED | OUTPATIENT
Start: 2024-05-09

## 2024-04-22 RX ORDER — ACETAMINOPHEN 325 MG/1
650 TABLET ORAL ONCE
Status: CANCELLED
Start: 2024-05-16

## 2024-04-22 RX ORDER — MEPERIDINE HYDROCHLORIDE 25 MG/ML
25 INJECTION INTRAMUSCULAR; INTRAVENOUS; SUBCUTANEOUS
Status: CANCELLED
Start: 2024-05-09

## 2024-04-22 RX ORDER — DIPHENHYDRAMINE HCL 25 MG
50 CAPSULE ORAL ONCE
Status: CANCELLED
Start: 2024-04-25

## 2024-04-22 RX ORDER — MEPERIDINE HYDROCHLORIDE 25 MG/ML
25 INJECTION INTRAMUSCULAR; INTRAVENOUS; SUBCUTANEOUS EVERY 30 MIN PRN
Status: CANCELLED | OUTPATIENT
Start: 2024-05-02

## 2024-04-22 RX ORDER — ALBUTEROL SULFATE 0.83 MG/ML
2.5 SOLUTION RESPIRATORY (INHALATION)
Status: CANCELLED | OUTPATIENT
Start: 2024-05-02

## 2024-04-22 RX ORDER — METHYLPREDNISOLONE SODIUM SUCCINATE 125 MG/2ML
125 INJECTION, POWDER, LYOPHILIZED, FOR SOLUTION INTRAMUSCULAR; INTRAVENOUS
Status: CANCELLED
Start: 2024-05-09

## 2024-04-22 RX ORDER — MEPERIDINE HYDROCHLORIDE 25 MG/ML
25 INJECTION INTRAMUSCULAR; INTRAVENOUS; SUBCUTANEOUS
Status: CANCELLED
Start: 2024-05-16

## 2024-04-22 RX ORDER — ACETAMINOPHEN 325 MG/1
650 TABLET ORAL ONCE
Status: CANCELLED
Start: 2024-05-09

## 2024-04-22 RX ORDER — ALBUTEROL SULFATE 0.83 MG/ML
2.5 SOLUTION RESPIRATORY (INHALATION)
Status: CANCELLED | OUTPATIENT
Start: 2024-05-09

## 2024-04-22 RX ORDER — ALBUTEROL SULFATE 90 UG/1
1-2 AEROSOL, METERED RESPIRATORY (INHALATION)
Status: CANCELLED
Start: 2024-05-02

## 2024-04-22 RX ORDER — DIPHENHYDRAMINE HCL 25 MG
50 CAPSULE ORAL ONCE
Status: CANCELLED
Start: 2024-05-02

## 2024-04-22 RX ORDER — ALBUTEROL SULFATE 90 UG/1
1-2 AEROSOL, METERED RESPIRATORY (INHALATION)
Status: CANCELLED
Start: 2024-05-16

## 2024-04-22 RX ORDER — LORAZEPAM 2 MG/ML
0.5 INJECTION INTRAMUSCULAR EVERY 4 HOURS PRN
Status: CANCELLED | OUTPATIENT
Start: 2024-04-25

## 2024-04-22 RX ORDER — DIPHENHYDRAMINE HCL 25 MG
50 CAPSULE ORAL ONCE
Status: CANCELLED
Start: 2024-05-09

## 2024-04-22 RX ORDER — ALBUTEROL SULFATE 90 UG/1
1-2 AEROSOL, METERED RESPIRATORY (INHALATION)
Status: CANCELLED
Start: 2024-04-25

## 2024-04-22 RX ORDER — ACETAMINOPHEN 325 MG/1
650 TABLET ORAL ONCE
Status: CANCELLED
Start: 2024-04-25

## 2024-04-22 RX ORDER — DIPHENHYDRAMINE HYDROCHLORIDE 50 MG/ML
50 INJECTION INTRAMUSCULAR; INTRAVENOUS
Status: CANCELLED
Start: 2024-05-16

## 2024-04-22 RX ORDER — LORAZEPAM 2 MG/ML
0.5 INJECTION INTRAMUSCULAR EVERY 4 HOURS PRN
Status: CANCELLED | OUTPATIENT
Start: 2024-05-09

## 2024-04-22 RX ORDER — DIPHENHYDRAMINE HYDROCHLORIDE 50 MG/ML
50 INJECTION INTRAMUSCULAR; INTRAVENOUS
Status: CANCELLED
Start: 2024-05-09

## 2024-04-22 RX ORDER — ALBUTEROL SULFATE 0.83 MG/ML
2.5 SOLUTION RESPIRATORY (INHALATION)
Status: CANCELLED | OUTPATIENT
Start: 2024-04-25

## 2024-04-22 RX ORDER — LORAZEPAM 2 MG/ML
0.5 INJECTION INTRAMUSCULAR EVERY 4 HOURS PRN
Status: CANCELLED | OUTPATIENT
Start: 2024-05-16

## 2024-04-22 RX ORDER — DIPHENHYDRAMINE HYDROCHLORIDE 50 MG/ML
50 INJECTION INTRAMUSCULAR; INTRAVENOUS
Status: CANCELLED
Start: 2024-05-02

## 2024-04-22 RX ORDER — ALBUTEROL SULFATE 0.83 MG/ML
2.5 SOLUTION RESPIRATORY (INHALATION)
Status: CANCELLED | OUTPATIENT
Start: 2024-05-16

## 2024-04-22 RX ORDER — LORAZEPAM 2 MG/ML
0.5 INJECTION INTRAMUSCULAR EVERY 4 HOURS PRN
Status: CANCELLED | OUTPATIENT
Start: 2024-05-02

## 2024-04-22 RX ORDER — MEPERIDINE HYDROCHLORIDE 25 MG/ML
25 INJECTION INTRAMUSCULAR; INTRAVENOUS; SUBCUTANEOUS
Status: CANCELLED
Start: 2024-04-25

## 2024-04-22 RX ORDER — MEPERIDINE HYDROCHLORIDE 25 MG/ML
25 INJECTION INTRAMUSCULAR; INTRAVENOUS; SUBCUTANEOUS EVERY 30 MIN PRN
Status: CANCELLED | OUTPATIENT
Start: 2024-04-25

## 2024-04-22 RX ORDER — EPINEPHRINE 1 MG/ML
0.3 INJECTION, SOLUTION INTRAMUSCULAR; SUBCUTANEOUS EVERY 5 MIN PRN
Status: CANCELLED | OUTPATIENT
Start: 2024-05-16

## 2024-04-22 RX ORDER — MEPERIDINE HYDROCHLORIDE 25 MG/ML
25 INJECTION INTRAMUSCULAR; INTRAVENOUS; SUBCUTANEOUS EVERY 30 MIN PRN
Status: CANCELLED | OUTPATIENT
Start: 2024-05-16

## 2024-04-22 RX ORDER — DIPHENHYDRAMINE HYDROCHLORIDE 50 MG/ML
50 INJECTION INTRAMUSCULAR; INTRAVENOUS
Status: CANCELLED
Start: 2024-04-25

## 2024-04-23 ENCOUNTER — MYC MEDICAL ADVICE (OUTPATIENT)
Dept: FAMILY MEDICINE | Facility: OTHER | Age: 69
End: 2024-04-23
Payer: MEDICARE

## 2024-04-23 NOTE — TELEPHONE ENCOUNTER
Dose increase OK. Any further dose increases I would have him discuss with PCP. Will he need a new script with updated instructions for TID/three times daily dosing?    Thank you,     Tessa

## 2024-04-24 ENCOUNTER — PREP FOR PROCEDURE (OUTPATIENT)
Dept: GASTROENTEROLOGY | Facility: CLINIC | Age: 69
End: 2024-04-24

## 2024-04-24 ENCOUNTER — LAB (OUTPATIENT)
Dept: LAB | Facility: OTHER | Age: 69
End: 2024-04-24
Payer: MEDICARE

## 2024-04-24 DIAGNOSIS — Z94.2 LUNG REPLACED BY TRANSPLANT (H): ICD-10-CM

## 2024-04-24 DIAGNOSIS — K86.2 PANCREATIC CYST: Primary | ICD-10-CM

## 2024-04-24 PROCEDURE — 80197 ASSAY OF TACROLIMUS: CPT | Mod: ZL

## 2024-04-24 PROCEDURE — 36415 COLL VENOUS BLD VENIPUNCTURE: CPT | Mod: ZL

## 2024-04-24 NOTE — TELEPHONE ENCOUNTER
Talked to wife and patient about follow up EUS- patient now ok to hold Plavix.     Please assist in scheduling:     Procedure/Imaging/Clinic: EUS  Physician: Diego  Timin-5  Scope time needed:provider average  Anesthesia:MAC  Dx: pancreatic cyst  Tier:Tier 3 -   Surgeries/procedures that can be delayed  between 30 to 90 days with no significant  morbidity/mortality to patient or impact on  patient/disease outcome.   Location: [facility]  Header of letter for pt communication: Endoscopic Ultrasound       Called to discuss with patient.     Explained they can expect a call from  for date of procedure, OR will call 1-2 days prior to procedure date with arrival time, will need a , someone to stay with them for 24 hours and should stay in town for 24 hours (within 45 min of Hospital) post procedure    Patient needs to get pre-op physical completed. If outside [facility] system will need physical faxed to number [phone]     If you do not get a preop physical, your procedure could be cancelled, patient voiced understanding*    Preop Plan:can use pulmonology note from 24    Does patient have any history of gastric bypass/gastric surgery/altered panc/bili anatomy?no    Any recent Covid symptoms or positive covid test?no    Does patient have [employer] insurance?:no    Med Review    Blood thinner -  Plavix, ok to hold 5 days prior to procedure   ASA - yes, ok to continue  Diabetic - no  Any meds by injection or mouth for weight loss or diabetes-none    Patient Education r/t procedure:done    A pre-op nurse will call 1-2 days prior to the procedure.    NPO/Prep:   Adults and Children of all ages may consume solids up to 8 hours prior to arrival time - may consume clear liquids up to 1 hour prior to arrival time.    Other specific details/comments:none     Verbalized understanding of all instructions. All questions answered.     Procedure order placed, message routed to OR / Endo

## 2024-04-25 ENCOUNTER — APPOINTMENT (OUTPATIENT)
Dept: LAB | Facility: CLINIC | Age: 69
End: 2024-04-25
Attending: INTERNAL MEDICINE
Payer: MEDICARE

## 2024-04-25 ENCOUNTER — INFUSION THERAPY VISIT (OUTPATIENT)
Dept: ONCOLOGY | Facility: CLINIC | Age: 69
End: 2024-04-25
Attending: INTERNAL MEDICINE
Payer: MEDICARE

## 2024-04-25 VITALS
SYSTOLIC BLOOD PRESSURE: 165 MMHG | OXYGEN SATURATION: 98 % | HEART RATE: 70 BPM | RESPIRATION RATE: 16 BRPM | WEIGHT: 164.3 LBS | TEMPERATURE: 97.7 F | DIASTOLIC BLOOD PRESSURE: 84 MMHG | HEIGHT: 66 IN | BODY MASS INDEX: 26.41 KG/M2

## 2024-04-25 DIAGNOSIS — Z94.2 S/P LUNG TRANSPLANT (H): ICD-10-CM

## 2024-04-25 DIAGNOSIS — D47.Z1 PTLD (POST-TRANSPLANT LYMPHOPROLIFERATIVE DISORDER) (H): Primary | ICD-10-CM

## 2024-04-25 LAB
BASOPHILS # BLD AUTO: 0 10E3/UL (ref 0–0.2)
BASOPHILS NFR BLD AUTO: 0 %
EOSINOPHIL # BLD AUTO: 0.1 10E3/UL (ref 0–0.7)
EOSINOPHIL NFR BLD AUTO: 1 %
ERYTHROCYTE [DISTWIDTH] IN BLOOD BY AUTOMATED COUNT: 13.7 % (ref 10–15)
HCT VFR BLD AUTO: 36.5 % (ref 40–53)
HGB BLD-MCNC: 11.5 G/DL (ref 13.3–17.7)
IMM GRANULOCYTES # BLD: 0.1 10E3/UL
IMM GRANULOCYTES NFR BLD: 1 %
LYMPHOCYTES # BLD AUTO: 3.4 10E3/UL (ref 0.8–5.3)
LYMPHOCYTES NFR BLD AUTO: 31 %
MCH RBC QN AUTO: 32.3 PG (ref 26.5–33)
MCHC RBC AUTO-ENTMCNC: 31.5 G/DL (ref 31.5–36.5)
MCV RBC AUTO: 103 FL (ref 78–100)
MONOCYTES # BLD AUTO: 1 10E3/UL (ref 0–1.3)
MONOCYTES NFR BLD AUTO: 10 %
NEUTROPHILS # BLD AUTO: 6.1 10E3/UL (ref 1.6–8.3)
NEUTROPHILS NFR BLD AUTO: 57 %
NRBC # BLD AUTO: 0 10E3/UL
NRBC BLD AUTO-RTO: 0 /100
PLATELET # BLD AUTO: 165 10E3/UL (ref 150–450)
RBC # BLD AUTO: 3.56 10E6/UL (ref 4.4–5.9)
TACROLIMUS BLD-MCNC: 8.4 UG/L (ref 5–15)
TME LAST DOSE: NORMAL H
TME LAST DOSE: NORMAL H
WBC # BLD AUTO: 10.7 10E3/UL (ref 4–11)

## 2024-04-25 PROCEDURE — 250N000011 HC RX IP 250 OP 636: Mod: JZ | Performed by: INTERNAL MEDICINE

## 2024-04-25 PROCEDURE — 36415 COLL VENOUS BLD VENIPUNCTURE: CPT | Performed by: INTERNAL MEDICINE

## 2024-04-25 PROCEDURE — 96415 CHEMO IV INFUSION ADDL HR: CPT

## 2024-04-25 PROCEDURE — 96413 CHEMO IV INFUSION 1 HR: CPT

## 2024-04-25 PROCEDURE — 258N000003 HC RX IP 258 OP 636: Performed by: INTERNAL MEDICINE

## 2024-04-25 PROCEDURE — 85025 COMPLETE CBC W/AUTO DIFF WBC: CPT | Performed by: INTERNAL MEDICINE

## 2024-04-25 PROCEDURE — 250N000013 HC RX MED GY IP 250 OP 250 PS 637: Performed by: INTERNAL MEDICINE

## 2024-04-25 RX ORDER — DIPHENHYDRAMINE HCL 25 MG
50 CAPSULE ORAL ONCE
Status: COMPLETED | OUTPATIENT
Start: 2024-04-25 | End: 2024-04-25

## 2024-04-25 RX ADMIN — DIPHENHYDRAMINE HYDROCHLORIDE 50 MG: 25 CAPSULE ORAL at 08:13

## 2024-04-25 RX ADMIN — RITUXIMAB-ABBS 700 MG: 10 INJECTION, SOLUTION INTRAVENOUS at 09:02

## 2024-04-25 RX ADMIN — SODIUM CHLORIDE 250 ML: 9 INJECTION, SOLUTION INTRAVENOUS at 08:58

## 2024-04-25 ASSESSMENT — PAIN SCALES - GENERAL: PAINLEVEL: SEVERE PAIN (6)

## 2024-04-25 NOTE — PROGRESS NOTES
Infusion Nursing Note:  Aubrey Duncan presents today for cycle 1 day 1 rituximab-abbs.    Patient seen by provider today: Yes,    present during visit today: Not Applicable.    Note: Patient new to infusion, oriented to infusion suite and call light.  Patient confirms that he has received written chemotherapy teaching materials. Basic side effects of medications reviewed today.  Patient confirms that he has a thermometer at home. Instructed to call with any fevers >100.4, shaking chills, other infectious symptoms, uncontrolled nausea, vomiting, diarrhea, constipation or any other new or concerning symptoms.  Provided with the phone number for triage.    Pt took 1000 mg of tylenol this morning at 0730am prior to his infusion appointment.    Intravenous Access:  Peripheral IV placed.    Treatment Conditions:  Lab Results   Component Value Date    HGB 11.5 (L) 04/25/2024    WBC 10.7 04/25/2024    ANEU 5.0 03/21/2024    ANEUTAUTO 6.1 04/25/2024     04/25/2024     Results reviewed, labs MET treatment parameters, ok to proceed with treatment.    Post Infusion Assessment:  Patient tolerated infusion without incident.  Blood return noted pre and post infusion.  Site patent and intact, free from redness, edema or discomfort.  No evidence of extravasations.  Access discontinued per protocol.     Discharge Plan:   Patient declined prescription refills.  Discharge instructions reviewed with: Patient and Family.  Patient and/or family verbalized understanding of discharge instructions and all questions answered.  AVS to patient via [employer].  Patient will return 05/02/24 for next appointment.   Patient discharged in stable condition accompanied by: self and wife.  Departure Mode: Ambulatory.      Emily Meese, RN

## 2024-04-25 NOTE — NURSING NOTE
Chief Complaint   Patient presents with    Blood Draw     Labs drawn via piv placed by VAT in lab. VS taken.      Labs drawn from PIV placed by VAT. Vital signs taken. Pt checked in for appointment(s).    Vanda Hill RN

## 2024-04-30 ENCOUNTER — OFFICE VISIT (OUTPATIENT)
Dept: FAMILY MEDICINE | Facility: OTHER | Age: 69
End: 2024-04-30
Attending: NURSE PRACTITIONER
Payer: MEDICARE

## 2024-04-30 VITALS
HEIGHT: 68 IN | BODY MASS INDEX: 25.39 KG/M2 | HEART RATE: 97 BPM | RESPIRATION RATE: 16 BRPM | SYSTOLIC BLOOD PRESSURE: 134 MMHG | OXYGEN SATURATION: 96 % | TEMPERATURE: 98.5 F | WEIGHT: 167.5 LBS | DIASTOLIC BLOOD PRESSURE: 74 MMHG

## 2024-04-30 DIAGNOSIS — L03.115 CELLULITIS OF RIGHT LOWER EXTREMITY: ICD-10-CM

## 2024-04-30 DIAGNOSIS — L97.221 ULCER OF CALF, LEFT, LIMITED TO BREAKDOWN OF SKIN (H): Primary | ICD-10-CM

## 2024-04-30 PROCEDURE — 99214 OFFICE O/P EST MOD 30 MIN: CPT | Performed by: STUDENT IN AN ORGANIZED HEALTH CARE EDUCATION/TRAINING PROGRAM

## 2024-04-30 PROCEDURE — 87070 CULTURE OTHR SPECIMN AEROBIC: CPT | Mod: ZL | Performed by: STUDENT IN AN ORGANIZED HEALTH CARE EDUCATION/TRAINING PROGRAM

## 2024-04-30 PROCEDURE — G0463 HOSPITAL OUTPT CLINIC VISIT: HCPCS

## 2024-04-30 RX ORDER — CEFADROXIL 500 MG/1
500 CAPSULE ORAL 2 TIMES DAILY
Qty: 14 CAPSULE | Refills: 0 | Status: SHIPPED | OUTPATIENT
Start: 2024-04-30 | End: 2024-05-07

## 2024-04-30 ASSESSMENT — PAIN SCALES - GENERAL: PAINLEVEL: MILD PAIN (3)

## 2024-04-30 NOTE — NURSING NOTE
"Patient presents to  for sore on his R calf x2 wks. Patient states he scratched his leg and there was a blister that must have popped open. He has been keeping it clean, and applying PolyMem to encourage healing. Patient is having increased and spreading pain in the leg. He does see infection coming off of the bandage.    Chief Complaint   Patient presents with    Sore     R calf       FOOD SECURITY SCREENING QUESTIONS  Hunger Vital Signs:  Within the past 12 months we worried whether our food would run out before we got money to buy more. Never  Within the past 12 months the food we bought just didn't last and we didn't have money to get more. Never  Brookesangeeta Menezeson 4/30/2024 10:03 AM      Initial /74 (BP Location: Right arm, Patient Position: Sitting, Cuff Size: Adult Regular)   Pulse 97   Temp 98.5  F (36.9  C) (Tympanic)   Resp 16   Ht 1.727 m (5' 8\")   Wt 76 kg (167 lb 8 oz)   SpO2 96%   BMI 25.47 kg/m   Estimated body mass index is 25.47 kg/m  as calculated from the following:    Height as of this encounter: 1.727 m (5' 8\").    Weight as of this encounter: 76 kg (167 lb 8 oz).  Medication Reconciliation: complete    Brooke Abdul  "

## 2024-04-30 NOTE — PATIENT INSTRUCTIONS
Wound on leg    Duricef twice a day for 1 week.    Keep the area clean and dry, continue to cover with at home bandage.    Wound culture is pending, this does take about 3 days.    Follow-up with wound care if persisting.    Return to rapid clinic or ER if symptoms worsen or change.

## 2024-05-01 ENCOUNTER — PATIENT OUTREACH (OUTPATIENT)
Dept: ONCOLOGY | Facility: CLINIC | Age: 69
End: 2024-05-01
Payer: MEDICARE

## 2024-05-01 NOTE — PROGRESS NOTES
[facility]: Cancer Care Follow-Up Note                                    Discussion with Patient:                                                      RNCC called Aubrey to follow up and check in after his first rituximab dose last week.        Goals          General     Monitoring (pt-stated)      Notes - Note created  4/18/2024 12:11 PM by Yohana Justice RN     Goal Statement: I will use my clinic and care team resources as directed.   Date Goal set: 4/18/2024  Barriers: multiple diagnoses  Strengths: support  Date to Achieve By: ongoing  Patient expressed understanding of goal:  Yes   Action steps to achieve this goal:  I will contact triage with new, worsening, or uncontrolled symptoms.   I will contact triage with temperature over 100.4  I will call with difficulties of scheduling and/or transportation.  I will request needed refills when there are 7 days of medication remaining.   I will not send urgent or symptomatic messages through [email or web address].  I will contact scheduling to arrange or make changes in my appointments.                Dates of Treatment:                                                      Infusion given in last 28 days       Administered MAR Action Medication Dose Rate Visit    04/25/2024 09:02 New Bag riTUXimab-abbs (TRUXIMA) 700 mg in sodium chloride 0.9 % 700 mL infusion 700 mg   Infusion Therapy Visit on 04/25/2024 in [facility]            Assessment:                                                      Since having Rituximab patient reports feeling well. Denies any concerns.     Intervention/Education provided during outreach:                                                       Aubrey started an antibiotic for cellulitis. Dr. Drake aware and okay to continue with infusion.   Informed Aubrey we were trying to add another MEETA visit, either in person with [place], or video with a [employer] MEETA.   Reminded Aubrey if he develops a fever or other symptoms he  should call triage.     Patient to follow up as scheduled at next appt  Patient to call/MyChart message with updates  Confirmed patient has clinic and triage numbers    Signature:  Yohana Justice RN

## 2024-05-02 ENCOUNTER — ONCOLOGY VISIT (OUTPATIENT)
Dept: ONCOLOGY | Facility: OTHER | Age: 69
End: 2024-05-02
Attending: NURSE PRACTITIONER
Payer: MEDICARE

## 2024-05-02 ENCOUNTER — APPOINTMENT (OUTPATIENT)
Dept: LAB | Facility: OTHER | Age: 69
End: 2024-05-02
Attending: NURSE PRACTITIONER
Payer: MEDICARE

## 2024-05-02 ENCOUNTER — HOSPITAL ENCOUNTER (OUTPATIENT)
Dept: INFUSION THERAPY | Facility: OTHER | Age: 69
Discharge: HOME | End: 2024-05-02
Attending: NURSE PRACTITIONER
Payer: MEDICARE

## 2024-05-02 VITALS
SYSTOLIC BLOOD PRESSURE: 145 MMHG | OXYGEN SATURATION: 96 % | HEART RATE: 77 BPM | TEMPERATURE: 97.6 F | DIASTOLIC BLOOD PRESSURE: 75 MMHG | WEIGHT: 168.8 LBS | RESPIRATION RATE: 14 BRPM | BODY MASS INDEX: 25.67 KG/M2

## 2024-05-02 VITALS
OXYGEN SATURATION: 96 % | HEART RATE: 80 BPM | BODY MASS INDEX: 25.68 KG/M2 | WEIGHT: 168.87 LBS | TEMPERATURE: 97.6 F | RESPIRATION RATE: 14 BRPM | DIASTOLIC BLOOD PRESSURE: 72 MMHG | SYSTOLIC BLOOD PRESSURE: 133 MMHG

## 2024-05-02 DIAGNOSIS — Z94.2 S/P LUNG TRANSPLANT (H): Primary | ICD-10-CM

## 2024-05-02 DIAGNOSIS — Z94.2 S/P LUNG TRANSPLANT (H): ICD-10-CM

## 2024-05-02 DIAGNOSIS — T86.810 CHRONIC REJECTION OF ALLOGRAFT LUNG (H): ICD-10-CM

## 2024-05-02 DIAGNOSIS — C83.398 DIFFUSE LARGE B-CELL LYMPHOMA OF SOLID ORGAN EXCLUDING SPLEEN: Primary | ICD-10-CM

## 2024-05-02 DIAGNOSIS — D47.Z1 PTLD (POST-TRANSPLANT LYMPHOPROLIFERATIVE DISORDER) (H): ICD-10-CM

## 2024-05-02 LAB
BACTERIA SPEC CULT: NO GROWTH
BASOPHILS # BLD AUTO: 0 10E3/UL (ref 0–0.2)
BASOPHILS NFR BLD AUTO: 0 %
EOSINOPHIL # BLD AUTO: 0.2 10E3/UL (ref 0–0.7)
EOSINOPHIL NFR BLD AUTO: 2 %
ERYTHROCYTE [DISTWIDTH] IN BLOOD BY AUTOMATED COUNT: 14.6 % (ref 10–15)
GRAM STAIN RESULT: NORMAL
GRAM STAIN RESULT: NORMAL
HCT VFR BLD AUTO: 33.5 % (ref 40–53)
HGB BLD-MCNC: 10.5 G/DL (ref 13.3–17.7)
IMM GRANULOCYTES # BLD: 0.1 10E3/UL
IMM GRANULOCYTES NFR BLD: 1 %
LYMPHOCYTES # BLD AUTO: 3.6 10E3/UL (ref 0.8–5.3)
LYMPHOCYTES NFR BLD AUTO: 38 %
MCH RBC QN AUTO: 33 PG (ref 26.5–33)
MCHC RBC AUTO-ENTMCNC: 31.3 G/DL (ref 31.5–36.5)
MCV RBC AUTO: 105 FL (ref 78–100)
MONOCYTES # BLD AUTO: 0.9 10E3/UL (ref 0–1.3)
MONOCYTES NFR BLD AUTO: 10 %
NEUTROPHILS # BLD AUTO: 4.8 10E3/UL (ref 1.6–8.3)
NEUTROPHILS NFR BLD AUTO: 50 %
NRBC # BLD AUTO: 0 10E3/UL
NRBC BLD AUTO-RTO: 0 /100
PLATELET # BLD AUTO: 149 10E3/UL (ref 150–450)
RBC # BLD AUTO: 3.18 10E6/UL (ref 4.4–5.9)
WBC # BLD AUTO: 9.5 10E3/UL (ref 4–11)

## 2024-05-02 PROCEDURE — 258N000003 HC RX IP 258 OP 636: Performed by: INTERNAL MEDICINE

## 2024-05-02 PROCEDURE — 96415 CHEMO IV INFUSION ADDL HR: CPT

## 2024-05-02 PROCEDURE — 36415 COLL VENOUS BLD VENIPUNCTURE: CPT | Performed by: INTERNAL MEDICINE

## 2024-05-02 PROCEDURE — G0463 HOSPITAL OUTPT CLINIC VISIT: HCPCS | Mod: 25 | Performed by: NURSE PRACTITIONER

## 2024-05-02 PROCEDURE — 250N000011 HC RX IP 250 OP 636: Mod: JZ | Performed by: INTERNAL MEDICINE

## 2024-05-02 PROCEDURE — 96413 CHEMO IV INFUSION 1 HR: CPT

## 2024-05-02 PROCEDURE — 99214 OFFICE O/P EST MOD 30 MIN: CPT | Performed by: NURSE PRACTITIONER

## 2024-05-02 PROCEDURE — 250N000013 HC RX MED GY IP 250 OP 250 PS 637: Performed by: INTERNAL MEDICINE

## 2024-05-02 PROCEDURE — 85025 COMPLETE CBC W/AUTO DIFF WBC: CPT | Performed by: INTERNAL MEDICINE

## 2024-05-02 RX ORDER — FLUTICASONE PROPIONATE AND SALMETEROL XINAFOATE 230; 21 UG/1; UG/1
2 AEROSOL, METERED RESPIRATORY (INHALATION) 2 TIMES DAILY
Qty: 8 G | Refills: 11 | Status: SHIPPED | OUTPATIENT
Start: 2024-05-02

## 2024-05-02 RX ORDER — DIPHENHYDRAMINE HCL 50 MG
50 CAPSULE ORAL ONCE
Status: COMPLETED | OUTPATIENT
Start: 2024-05-02 | End: 2024-05-02

## 2024-05-02 RX ORDER — ACETAMINOPHEN 325 MG/1
650 TABLET ORAL ONCE
Status: COMPLETED | OUTPATIENT
Start: 2024-05-02 | End: 2024-05-02

## 2024-05-02 RX ADMIN — SODIUM CHLORIDE 250 ML: 9 INJECTION, SOLUTION INTRAVENOUS at 08:52

## 2024-05-02 RX ADMIN — DIPHENHYDRAMINE HYDROCHLORIDE 50 MG: 50 CAPSULE ORAL at 08:48

## 2024-05-02 RX ADMIN — ACETAMINOPHEN 650 MG: 325 TABLET, FILM COATED ORAL at 08:48

## 2024-05-02 RX ADMIN — RITUXIMAB-ABBS 700 MG: 10 INJECTION, SOLUTION INTRAVENOUS at 09:20

## 2024-05-02 ASSESSMENT — PAIN SCALES - GENERAL: PAINLEVEL: MODERATE PAIN (4)

## 2024-05-02 NOTE — NURSING NOTE
Infusion Nursing Note:  Aubrey Duncan presents today for Rituxan- Rapid infusion cycle 1 day 8.    Patient seen by provider today: Yes: Gabriela Garcia NP    present during visit today: Not Applicable.    Note: Patient completed day 1 of Rituxan at [facility], here for day 8. Patient seen by [facility] for status check. Patient has no complaints today. Patient states he tolerated week 1 without complaints. Patient reminded to follow-up with concerns of fever or infection being he is currently on antibiotics for cellulitis of right leg. Ok per Dr. Drake to have infusion today      Intravenous Access:  Labs drawn without difficulty.  Peripheral IV placed.    Treatment Conditions:  Lab Results   Component Value Date    HGB 10.5 (L) 05/02/2024    WBC 9.5 05/02/2024    ANEU 5.0 03/21/2024    ANEUTAUTO 4.8 05/02/2024     (L) 05/02/2024        Results reviewed, labs MET treatment parameters, ok to proceed with treatment.      Post Infusion Assessment:  Patient tolerated infusion without incident.  Blood return noted pre and post infusion.  Site patent and intact, free from redness, edema or discomfort.  No evidence of extravasations.  Access discontinued per protocol.  Biologic Infusion Post Education: Call the triage nurse at your clinic or seek medical attention if you have chills and/or temperature greater than or equal to 100.5, uncontrolled nausea/vomiting, diarrhea, constipation, dizziness, shortness of breath, chest pain, heart palpitations, weakness or any other new or concerning symptoms, questions or concerns.  You cannot have any live virus vaccines prior to or during treatment or up to 6 months post infusion.  If you have an upcoming surgery, medical procedure or dental procedure during treatment, this should be discussed with your ordering physician and your surgeon/dentist.  If you are having any concerning symptom, if you are unsure if you should get your next infusion or wish to speak to a provider  before your next infusion, please call your care coordinator or triage nurse at your clinic to notify them so we can adequately serve you.       Discharge Plan:   Discharge instructions reviewed with: Patient.  Patient and/or family verbalized understanding of discharge instructions and all questions answered.  AVS to patient via [employer].  Patient will return 5/9/24 for next appointment.   Patient discharged in stable condition accompanied by: self and wife.  Departure Mode: Ambulatory.      Kenia Rodriguez RN

## 2024-05-02 NOTE — NURSING NOTE
Chief Complaint   Patient presents with    Oncology Clinic Visit     NEW:  post-transplant lymphoproliferative disorder       Patient is being seen by provider in infusion where part of the required rooming assessments and vitals were done by the infusion RN.     Claudia Faulkner CMA ([facility])

## 2024-05-02 NOTE — PROGRESS NOTES
Oncology/Infusion Follow-up Visit:  May 2, 2024  Diagnosis:Lymphoma    History Of Present Illness:  Patient was seen in infusion therapy for a status check He is here for his Rituxan infusion for his diffuse large B cell lymphoma. Patient was seen in consultation by Dr Drake at [facility] regarding newly diagnosed DLBCL in setting of bilateral lung transplant in 2013. For complete history, please see note dated 4/18/24. Patient had presented to [facility] on 3/21/24 with melena and acute blood loss anemia with Hgb down to 8.2 from baseline of 11-12. EGD revealed a 5 cm segment of ulcerated and friable mucosa in the second segment of the duodenum. There was no active bleeding and the area was too diffuse to be treated endoscopically, but the area was biopsied. Case was discussed with Cardiology who recommended to continue DAPT with aspirin and Plavix given recent stenting (2/16/24). Pathology showed diffuse large B-cell lymphoma, GCB-subtype, with Ki67 80-90%. JUNG DAKOTA is negative. Plan was to start with single-agent rituximab weekly x 4 doses. It was felt that because of the disease location, he was at risk of bowel perforation and/or recurrent GI bleeding. Patient states he has been felling well. He did have 1 day of nausea after the last treatment and this resolved without use of antinausea medication. He states his appetite is stable. He has no abdominal pain or changes in bowels. He is currently on antibiotics for a cellulitis of the right lower extremity. Patient states this is improving. He does note ongoing issues with poor circulation and lower extremity edema. He will be seeing Dr Mcdermott for this. Labs done today show a normal wbc, hemoglobin is 10.5, platelet count is 149,000. Patient has no other new concerns.    Review Of Systems:  Review Of Systems  Skin: reports cellulitis of right lower extremity  Respiratory: No shortness of breath, dyspnea on exertion, cough  Cardiovascular: denies  chest pain  Gastrointestinal: denies abdominal pain, no bowel changes  Genitourinary: denies dysuria or hematuria  Neurologic: reports chronic neuropathy in feet, stable. Denies headaches  Hematologic/Lymphatic/Immunologic: denies fevers        Nursing Notes:   Claudia Faulkner CMA  5/2/2024 10:16 AM  Signed  Chief Complaint   Patient presents with    Oncology Clinic Visit     NEW:  post-transplant lymphoproliferative disorder       Patient is being seen by provider in infusion where part of the required rooming assessments and vitals were done by the infusion RN.     Claudia Faulkner CMA (AAMA)       Past medical, social, surgical, and family histories reviewed.    Allergies:  Allergies as of 05/02/2024 - Reviewed 05/02/2024   Allergen Reaction Noted    Heparin Other (See Comments) 09/18/2013    Oxycodone Other (See Comments) 09/18/2013    Fluocinolone Other (See Comments) 09/18/2012    Levaquin Muscle Pain (Myalgia) 06/04/2012    Pneumococcal vaccine Other (See Comments) 06/04/2012    Varicella zoster immune globulin Swelling 06/21/2021       Current Medications:  Current Outpatient Medications   Medication Sig Dispense Refill    acetaminophen (TYLENOL) 325 MG tablet Take 2 tablets (650 mg) by mouth every 6 hours as needed for mild pain 60 tablet 0    acyclovir (ZOVIRAX) 400 MG tablet Take 1 tablet (400 mg) by mouth 2 times daily 60 tablet 3    albuterol (PROAIR HFA/PROVENTIL HFA/VENTOLIN HFA) 108 (90 Base) MCG/ACT inhaler Inhale 1-2 puffs into the lungs every 6 hours as needed for shortness of breath or wheezing 8.5 g 3    amLODIPine (NORVASC) 10 MG tablet TAKE 1 TABLET (10 MG) BY MOUTH DAILY 90 tablet 3    aspirin (ASA) 81 MG EC tablet Take 1 tablet (81 mg) by mouth daily 90 tablet 3    azithromycin (ZITHROMAX) 250 MG tablet Take 250 mg by mouth Every Mon, Wed, Fri Morning      blood glucose (NO BRAND SPECIFIED) test strip USE TO TEST BLOOD SUGAR 3 TIMES DAILY. DIAG CODE: E11.9 300 strip 0    Calcium  Carbonate-Vitamin D 600-10 MG-MCG TABS Take 1 tablet by mouth 2 times daily (with meals) 60 tablet 11    carvedilol (COREG) 6.25 MG tablet TAKE 1 TABLET (6.25 MG) BY MOUTH 2 TIMES DAILY (WITH MEALS) 120 tablet 3    cefadroxil (DURICEF) 500 MG capsule Take 1 capsule (500 mg) by mouth 2 times daily for 7 days 14 capsule 0    clopidogrel (PLAVIX) 75 MG tablet Take 1 tablet (75 mg) by mouth daily 90 tablet 3    dapsone (ACZONE) 25 MG tablet Take 2 tablets (50 mg) by mouth daily 180 tablet 3    doxycycline hyclate (VIBRAMYCIN) 100 MG capsule Take 1 capsule (100 mg) by mouth 2 times daily 60 capsule 0    econazole nitrate 1 % external cream APPLY TOPICALLY DAILY TO FEET AND HEELS. 85 g 3    fludrocortisone (FLORINEF) 0.1 MG tablet Take 1 tablet (0.1 mg) by mouth daily 90 tablet 3    fluticasone-salmeterol (ADVAIR-HFA) 230-21 MCG/ACT inhaler Inhale 2 puffs into the lungs 2 times daily 8 g 11    furosemide (LASIX) 20 MG tablet Take 1 tablet (20 mg) by mouth daily 90 tablet 4    gabapentin (NEURONTIN) 100 MG capsule Take 2 capsules (200 mg) by mouth 2 times daily 360 capsule 3    insulin aspart (NOVOLOG PEN) 100 UNIT/ML pen Take 5 U am, 3 unit(s) non, 5 unit(s) pm of insulin within 30 minutes of start of breakfast, lunch, and dinner. Do not give if blood sugar is less than 70 mg/dl.      insulin glargine (LANTUS PEN) 100 UNIT/ML pen Inject 18 Units Subcutaneous every morning (before breakfast)      insulin pen needle (32G X 4 MM) 32G X 4 MM miscellaneous Use 4 pen needles daily or as directed. Dispense as insurance allows. Dx. Code: E09.9 400 each 11    Lancet Devices (MICROLET NEXT LANCING DEVICE) MISC USE AS DIRECTED 1 each 3    lisinopril (ZESTRIL) 40 MG tablet TAKE 1 TABLET (40 MG) BY MOUTH DAILY IN THE MORNING (Patient not taking: Reported on 5/2/2024) 90 tablet 0    loperamide (IMODIUM) 2 MG capsule Take 1 capsule (2 mg) by mouth 4 times daily as needed for diarrhea (Patient not taking: Reported on 4/25/2024) 120  capsule 12    magnesium oxide (MAG-OX) 400 MG tablet Take 1 tablet (400 mg) by mouth 2 times daily 180 tablet 3    Microlet Lancets MISC CHECK BLOOD SUGAR FOUR TIMES DAILY E11.9 400 each 1    montelukast (SINGULAIR) 10 MG tablet Take 1 tablet (10 mg) by mouth every evening 90 tablet 3    multivitamin, therapeutic (THERA-VIT) TABS Take 1 tablet by mouth daily 30 tablet 12    omeprazole (PRILOSEC) 20 MG DR capsule Take 1 capsule (20 mg) by mouth 2 times daily (before meals) (Patient taking differently: Take 40 mg by mouth 2 times daily (before meals)) 180 capsule 3    order for DME Equipment being ordered: diabetic shoes 1 each 0    predniSONE (DELTASONE) 20 MG tablet Take 3 tabs by mouth daily x 3 days, then 2 tabs daily x 3 days, then 1 tab daily x 3 days, then 1/2 tab daily x 3 days. (Patient not taking: Reported on 4/25/2024) 20 tablet 0    predniSONE (DELTASONE) 5 MG tablet TAKE ONE TABLET BY MOUTH ONCE DAILY IN THE MORNING AND ONE-HALF TABLET IN THE EVENING 45 tablet 12    rosuvastatin (CRESTOR) 40 MG tablet Take 20 mg by mouth Every Mon, Wed, Fri Morning      sildenafil (VIAGRA) 25 MG tablet Take 1 tablet (25 mg) by mouth as needed (as needed) 32 tablet 11    tacrolimus (GENERIC EQUIVALENT) 0.5 MG capsule Take 1 capsule (0.5 mg) by mouth daily Total dose: 3 mg in the AM and 3 mg in the PM ON HOLD 4/18/24 FOR DOSE ADJUSTMENTS (Patient not taking: Reported on 5/2/2024)      tacrolimus (GENERIC EQUIVALENT) 1 MG capsule Take 3 capsules (3 mg) by mouth every morning AND 3 capsules (3 mg) every evening. Total dose: 3 mg in AM and 3 mg in  capsule 11        Physical Exam:  /72   Pulse 80   Temp 97.6  F (36.4  C) (Temporal)   Resp 14   Wt 76.6 kg (168 lb 14 oz)   SpO2 96%   BMI 25.68 kg/m      GENERAL APPEARANCE: 68 year old male, alert and no distress     NECK: no adenopathy, no asymmetry or masses     LYMPHATICS: No cervical, supraclavicular, axillary lymphadenopathy     RESP: lungs clear to  auscultation - no rales, rhonchi or wheezes     CARDIOVASCULAR: regular rates and rhythm, normal S1 S2     ABDOMEN:  soft, nontender, bowel sounds normal     MUSCULOSKELETAL: extremities normal- no gross deformities noted, pedal edema noted b/l LE.     SKIN: no suspicious lesions or rashes on exposed skin     PSYCHIATRIC: mentation appears normal and affect normal    Laboratory/Imaging Studies  Office Visit on 04/30/2024   Component Date Value Ref Range Status    Culture 04/30/2024 No Growth   Final    Gram Stain Result 04/30/2024 1+ PMNs/low power field   Final    Gram Stain Result 04/30/2024 No organisms seen   Final   Infusion Therapy Visit on 04/25/2024   Component Date Value Ref Range Status    WBC Count 04/25/2024 10.7  4.0 - 11.0 10e3/uL Final    RBC Count 04/25/2024 3.56 (L)  4.40 - 5.90 10e6/uL Final    Hemoglobin 04/25/2024 11.5 (L)  13.3 - 17.7 g/dL Final    Hematocrit 04/25/2024 36.5 (L)  40.0 - 53.0 % Final    MCV 04/25/2024 103 (H)  78 - 100 fL Final    MCH 04/25/2024 32.3  26.5 - 33.0 pg Final    MCHC 04/25/2024 31.5  31.5 - 36.5 g/dL Final    RDW 04/25/2024 13.7  10.0 - 15.0 % Final    Platelet Count 04/25/2024 165  150 - 450 10e3/uL Final    % Neutrophils 04/25/2024 57  % Final    % Lymphocytes 04/25/2024 31  % Final    % Monocytes 04/25/2024 10  % Final    % Eosinophils 04/25/2024 1  % Final    % Basophils 04/25/2024 0  % Final    % Immature Granulocytes 04/25/2024 1  % Final    NRBCs per 100 WBC 04/25/2024 0  <1 /100 Final    Absolute Neutrophils 04/25/2024 6.1  1.6 - 8.3 10e3/uL Final    Absolute Lymphocytes 04/25/2024 3.4  0.8 - 5.3 10e3/uL Final    Absolute Monocytes 04/25/2024 1.0  0.0 - 1.3 10e3/uL Final    Absolute Eosinophils 04/25/2024 0.1  0.0 - 0.7 10e3/uL Final    Absolute Basophils 04/25/2024 0.0  0.0 - 0.2 10e3/uL Final    Absolute Immature Granulocytes 04/25/2024 0.1  <=0.4 10e3/uL Final    Absolute NRBCs 04/25/2024 0.0  10e3/uL Final   Lab on 04/24/2024   Component Date Value Ref  Range Status    Tacrolimus by Tandem Mass Spectrom* 04/24/2024 8.4  5.0 - 15.0 ug/L Final    Comment: Tacrolimus Reference Range (ug/L):    Kidney Transplant:  Pediatric  0-3 months post transplant: 10-12  3-6 months post transplant: 8-10  6-12 months post transplant: 6-8  >12 months post transplant: 4-7    Adult  0-6 months post transplant: 8-10  6-12 months post transplant: 6-8  >12 months post transplant: 4-6  >5 years post transplant: 3-5    Heart Transplant:  Pediatric  0-12 months post transplant: 10-15  >12 months post transplant: 5-10    Adult  0-3 months post transplant: 10-15  3-6 months post transplant: 8-12  6-12 months post transplant: 6-12  >12 months post transplant: 6-10    Lung Transplant:  0-12 months post transplant: 10-15  >12 months post transplant: 8-12    Liver Transplant:  Pediatric  0-3 months post transplant: 10-15  3-6 months post transplant: 8-10  6 months-5 years post transplant: 6-8   >5 years post transplant: 1-3    Adult  0-3 months post transplant: 10-12  3-6 months post transplant: 8-10  >6 months post transplant: 6-8    Pancreas Transplant:  0-6                            months post transplant: 8-10  >6 months post transplant: 5-8    Tacrolimus Last Dose Date 04/24/2024 4/23/2024   Final    Tacrolimus Last Dose Time 04/24/2024  9:20 AM   Final        ASSESSMENT/PLAN:  Diffuse large B cell lymphoma. Patient presented with melena/acute blood anemia and was found to have a 5 cm segment of ulcerated/friable mucosa in the second portion of the duodenum on colonoscopy. Biopsy shows DLBCL, germinal center subtype, EBV negative. FISH negative for MYC and BCL6 rearrangements. 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 Patient is here for his second treatment. He is tolerating treatment well.  Patient is currently on antibiotics for possible cellulitis of the right leg. CBC today is stable. Patient will be seeing Dr Rosales for ongoing evaluation of bilateral foot ulcers. Treatment given today. Patient to follow up with Dr Drake as planned.     Thirty three minutes spent with this encounter with time spent reviewing records, counseling patient regarding disease process, interpretation and review of labs, discussing treatment plan, obtaining a review of systems, performing exam, documenting in EHR and coordination of care

## 2024-05-03 ENCOUNTER — OFFICE VISIT (OUTPATIENT)
Dept: PODIATRY | Facility: CLINIC | Age: 69
End: 2024-05-03
Payer: MEDICARE

## 2024-05-03 DIAGNOSIS — L97.521 SKIN ULCER OF LEFT FOOT, LIMITED TO BREAKDOWN OF SKIN (H): ICD-10-CM

## 2024-05-03 DIAGNOSIS — E11.49 TYPE II OR UNSPECIFIED TYPE DIABETES MELLITUS WITH NEUROLOGICAL MANIFESTATIONS, NOT STATED AS UNCONTROLLED(250.60) (H): ICD-10-CM

## 2024-05-03 DIAGNOSIS — E11.51 DIABETES MELLITUS WITH PERIPHERAL VASCULAR DISEASE (H): Primary | ICD-10-CM

## 2024-05-03 DIAGNOSIS — R60.0 PERIPHERAL EDEMA: ICD-10-CM

## 2024-05-03 DIAGNOSIS — L97.912 SKIN ULCER OF RIGHT LOWER LEG WITH FAT LAYER EXPOSED (H): ICD-10-CM

## 2024-05-03 PROCEDURE — 99213 OFFICE O/P EST LOW 20 MIN: CPT | Mod: 25 | Performed by: PODIATRIST

## 2024-05-03 PROCEDURE — 29581 APPL MULTLAYER CMPRN SYS LEG: CPT | Mod: RT | Performed by: PODIATRIST

## 2024-05-03 RX ORDER — CEFADROXIL 500 MG/1
500 CAPSULE ORAL 2 TIMES DAILY
Qty: 28 CAPSULE | Refills: 0 | Status: SHIPPED | OUTPATIENT
Start: 2024-05-03 | End: 2024-06-06

## 2024-05-03 NOTE — NURSING NOTE
"Aubrey Duncan's chief complaint for this visit includes:  Chief Complaint   Patient presents with    Consult     Foot and leg recheck     PCP: Hoa Olivarez    Referring Provider:  No referring provider defined for this encounter.    There were no vitals taken for this visit.  Data Unavailable     Do you need any medication refills at today's visit? NO    Allergies   Allergen Reactions    Heparin Other (See Comments)     HIT positive and AUGUST positive    No Heparin Antibody Identified on 8/15 blood test    Oxycodone Other (See Comments)     Significant lethargy, confusion. Tolerates dilaudid well.     Fluocinolone Other (See Comments)     Tendon problems      Levaquin Muscle Pain (Myalgia)    Pneumococcal Vaccine Other (See Comments)     Other reaction(s): Fever  \"My arm swelled up like a balloon.\"    Varicella Zoster Immune Globulin Swelling       Charley Barcenas LPN  "
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
## 2024-05-06 ENCOUNTER — OFFICE VISIT (OUTPATIENT)
Dept: PODIATRY | Facility: CLINIC | Age: 69
End: 2024-05-06
Payer: MEDICARE

## 2024-05-06 DIAGNOSIS — E11.49 TYPE II OR UNSPECIFIED TYPE DIABETES MELLITUS WITH NEUROLOGICAL MANIFESTATIONS, NOT STATED AS UNCONTROLLED(250.60) (H): ICD-10-CM

## 2024-05-06 DIAGNOSIS — E11.51 DIABETES MELLITUS WITH PERIPHERAL VASCULAR DISEASE (H): Primary | ICD-10-CM

## 2024-05-06 DIAGNOSIS — L84 TYLOMA: ICD-10-CM

## 2024-05-06 DIAGNOSIS — R60.0 PERIPHERAL EDEMA: ICD-10-CM

## 2024-05-06 DIAGNOSIS — L97.912 SKIN ULCER OF RIGHT LOWER LEG WITH FAT LAYER EXPOSED (H): ICD-10-CM

## 2024-05-06 PROCEDURE — 11055 PARING/CUTG B9 HYPRKER LES 1: CPT | Performed by: PODIATRIST

## 2024-05-06 PROCEDURE — 99214 OFFICE O/P EST MOD 30 MIN: CPT | Mod: 25 | Performed by: PODIATRIST
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
## 2024-05-06 NOTE — NURSING NOTE
"Aubrey Duncan's chief complaint for this visit includes:  Chief Complaint   Patient presents with    Consult     Leg recheck      PCP: Hoa Olivarez    Referring Provider:  No referring provider defined for this encounter.    There were no vitals taken for this visit.  Data Unavailable     Do you need any medication refills at today's visit? NO    Allergies   Allergen Reactions    Heparin Other (See Comments)     HIT positive and AUGUST positive    No Heparin Antibody Identified on 8/15 blood test    Oxycodone Other (See Comments)     Significant lethargy, confusion. Tolerates dilaudid well.     Fluocinolone Other (See Comments)     Tendon problems      Levaquin Muscle Pain (Myalgia)    Pneumococcal Vaccine Other (See Comments)     Other reaction(s): Fever  \"My arm swelled up like a balloon.\"    Varicella Zoster Immune Globulin Swelling       Charley Barcenas LPN  "

## 2024-05-09 ENCOUNTER — HOSPITAL ENCOUNTER (OUTPATIENT)
Dept: INFUSION THERAPY | Facility: OTHER | Age: 69
Discharge: HOME | End: 2024-05-09
Attending: NURSE PRACTITIONER
Payer: MEDICARE

## 2024-05-09 ENCOUNTER — APPOINTMENT (OUTPATIENT)
Dept: LAB | Facility: OTHER | Age: 69
End: 2024-05-09
Attending: NURSE PRACTITIONER
Payer: MEDICARE

## 2024-05-09 VITALS
RESPIRATION RATE: 16 BRPM | OXYGEN SATURATION: 96 % | DIASTOLIC BLOOD PRESSURE: 69 MMHG | HEART RATE: 85 BPM | SYSTOLIC BLOOD PRESSURE: 139 MMHG | BODY MASS INDEX: 26.03 KG/M2 | WEIGHT: 171.2 LBS | TEMPERATURE: 97.7 F

## 2024-05-09 DIAGNOSIS — D47.Z1 PTLD (POST-TRANSPLANT LYMPHOPROLIFERATIVE DISORDER) (H): ICD-10-CM

## 2024-05-09 DIAGNOSIS — Z94.2 S/P LUNG TRANSPLANT (H): Primary | ICD-10-CM

## 2024-05-09 LAB
BASOPHILS # BLD AUTO: 0 10E3/UL (ref 0–0.2)
BASOPHILS NFR BLD AUTO: 0 %
EOSINOPHIL # BLD AUTO: 0.2 10E3/UL (ref 0–0.7)
EOSINOPHIL NFR BLD AUTO: 3 %
ERYTHROCYTE [DISTWIDTH] IN BLOOD BY AUTOMATED COUNT: 15.7 % (ref 10–15)
HCT VFR BLD AUTO: 31.8 % (ref 40–53)
HGB BLD-MCNC: 9.7 G/DL (ref 13.3–17.7)
IMM GRANULOCYTES # BLD: 0.1 10E3/UL
IMM GRANULOCYTES NFR BLD: 1 %
LYMPHOCYTES # BLD AUTO: 3.2 10E3/UL (ref 0.8–5.3)
LYMPHOCYTES NFR BLD AUTO: 37 %
MCH RBC QN AUTO: 31.6 PG (ref 26.5–33)
MCHC RBC AUTO-ENTMCNC: 30.5 G/DL (ref 31.5–36.5)
MCV RBC AUTO: 104 FL (ref 78–100)
MONOCYTES # BLD AUTO: 1 10E3/UL (ref 0–1.3)
MONOCYTES NFR BLD AUTO: 12 %
NEUTROPHILS # BLD AUTO: 4 10E3/UL (ref 1.6–8.3)
NEUTROPHILS NFR BLD AUTO: 47 %
NRBC # BLD AUTO: 0 10E3/UL
NRBC BLD AUTO-RTO: 0 /100
PLATELET # BLD AUTO: 179 10E3/UL (ref 150–450)
RBC # BLD AUTO: 3.07 10E6/UL (ref 4.4–5.9)
WBC # BLD AUTO: 8.6 10E3/UL (ref 4–11)

## 2024-05-09 PROCEDURE — 258N000003 HC RX IP 258 OP 636: Performed by: INTERNAL MEDICINE

## 2024-05-09 PROCEDURE — 85049 AUTOMATED PLATELET COUNT: CPT | Performed by: INTERNAL MEDICINE

## 2024-05-09 PROCEDURE — 96413 CHEMO IV INFUSION 1 HR: CPT

## 2024-05-09 PROCEDURE — 250N000011 HC RX IP 250 OP 636: Mod: JZ | Performed by: INTERNAL MEDICINE

## 2024-05-09 PROCEDURE — 250N000013 HC RX MED GY IP 250 OP 250 PS 637: Performed by: INTERNAL MEDICINE

## 2024-05-09 PROCEDURE — 36591 DRAW BLOOD OFF VENOUS DEVICE: CPT | Performed by: INTERNAL MEDICINE

## 2024-05-09 PROCEDURE — 96415 CHEMO IV INFUSION ADDL HR: CPT

## 2024-05-09 RX ORDER — DIPHENHYDRAMINE HCL 50 MG
50 CAPSULE ORAL ONCE
Status: COMPLETED | OUTPATIENT
Start: 2024-05-09 | End: 2024-05-09

## 2024-05-09 RX ORDER — ACETAMINOPHEN 325 MG/1
650 TABLET ORAL ONCE
Status: COMPLETED | OUTPATIENT
Start: 2024-05-09 | End: 2024-05-09

## 2024-05-09 RX ADMIN — SODIUM CHLORIDE 250 ML: 9 INJECTION, SOLUTION INTRAVENOUS at 08:31

## 2024-05-09 RX ADMIN — RITUXIMAB-ABBS 700 MG: 10 INJECTION, SOLUTION INTRAVENOUS at 09:03

## 2024-05-09 RX ADMIN — DIPHENHYDRAMINE HYDROCHLORIDE 50 MG: 50 CAPSULE ORAL at 08:30

## 2024-05-09 RX ADMIN — ACETAMINOPHEN 650 MG: 325 TABLET, FILM COATED ORAL at 08:30

## 2024-05-09 NOTE — NURSING NOTE
Infusion Nursing Note:  Aubrey MAURILIO Jayce presents today for Rituximab.    Patient seen by provider today: No   present during visit today: Not Applicable.    Note: N/A.    Intravenous Access:  Labs drawn without difficulty.  Peripheral IV placed.    Treatment Conditions:  Lab Results   Component Value Date    HGB 9.7 (L) 05/09/2024    WBC 8.6 05/09/2024    ANEU 5.0 03/21/2024    ANEUTAUTO 4.0 05/09/2024     05/09/2024      Results reviewed, labs MET treatment parameters, ok to proceed with treatment.    Post Infusion Assessment:  Patient tolerated injection without incident.  Blood return noted pre and post infusion.  Site patent and intact, free from redness, edema or discomfort.  No evidence of extravasations.  Access discontinued per protocol.     Discharge Plan:   Discharge instructions reviewed with: Patient.  Patient and/or family verbalized understanding of discharge instructions and all questions answered.  Copy of AVS declined by patient and/or family.  Patient will return 5/15/24 for next appointment.  AVS to patient via [employer].    Patient discharged in stable condition accompanied by: self and wife.  Departure Mode: Ambulatory.      Yohana Enrique RN
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
## 2024-05-15 ENCOUNTER — TELEPHONE (OUTPATIENT)
Dept: DERMATOLOGY | Facility: CLINIC | Age: 69
End: 2024-05-15

## 2024-05-15 ENCOUNTER — ONCOLOGY VISIT (OUTPATIENT)
Dept: ONCOLOGY | Facility: OTHER | Age: 69
End: 2024-05-15
Attending: NURSE PRACTITIONER
Payer: MEDICARE

## 2024-05-15 ENCOUNTER — HOSPITAL ENCOUNTER (OUTPATIENT)
Dept: INFUSION THERAPY | Facility: OTHER | Age: 69
Discharge: HOME | End: 2024-05-15
Attending: NURSE PRACTITIONER
Payer: MEDICARE

## 2024-05-15 ENCOUNTER — PRE VISIT (OUTPATIENT)
Dept: DERMATOLOGY | Facility: CLINIC | Age: 69
End: 2024-05-15

## 2024-05-15 ENCOUNTER — LAB (OUTPATIENT)
Dept: LAB | Facility: OTHER | Age: 69
End: 2024-05-15
Attending: NURSE PRACTITIONER
Payer: MEDICARE

## 2024-05-15 VITALS
BODY MASS INDEX: 26.06 KG/M2 | SYSTOLIC BLOOD PRESSURE: 143 MMHG | DIASTOLIC BLOOD PRESSURE: 79 MMHG | TEMPERATURE: 97.8 F | RESPIRATION RATE: 18 BRPM | WEIGHT: 171.4 LBS | OXYGEN SATURATION: 96 % | HEART RATE: 75 BPM

## 2024-05-15 DIAGNOSIS — R53.83 CHEMOTHERAPY-INDUCED FATIGUE: ICD-10-CM

## 2024-05-15 DIAGNOSIS — Z94.2 S/P LUNG TRANSPLANT (H): Primary | ICD-10-CM

## 2024-05-15 DIAGNOSIS — Z94.2 LUNG REPLACED BY TRANSPLANT (H): ICD-10-CM

## 2024-05-15 DIAGNOSIS — D47.Z1 PTLD (POST-TRANSPLANT LYMPHOPROLIFERATIVE DISORDER) (H): ICD-10-CM

## 2024-05-15 DIAGNOSIS — R60.0 LOCALIZED EDEMA: ICD-10-CM

## 2024-05-15 DIAGNOSIS — T45.1X5A CHEMOTHERAPY-INDUCED FATIGUE: ICD-10-CM

## 2024-05-15 DIAGNOSIS — C83.398 DIFFUSE LARGE B-CELL LYMPHOMA OF SOLID ORGAN EXCLUDING SPLEEN: Primary | ICD-10-CM

## 2024-05-15 DIAGNOSIS — Z94.2 S/P LUNG TRANSPLANT (H): ICD-10-CM

## 2024-05-15 LAB
ANION GAP SERPL CALCULATED.3IONS-SCNC: 10 MMOL/L (ref 7–15)
BASOPHILS # BLD AUTO: 0 10E3/UL (ref 0–0.2)
BASOPHILS NFR BLD AUTO: 1 %
BUN SERPL-MCNC: 27.4 MG/DL (ref 8–23)
CALCIUM SERPL-MCNC: 8.2 MG/DL (ref 8.8–10.2)
CHLORIDE SERPL-SCNC: 111 MMOL/L (ref 98–107)
CREAT SERPL-MCNC: 1 MG/DL (ref 0.67–1.17)
DEPRECATED HCO3 PLAS-SCNC: 23 MMOL/L (ref 22–29)
EGFRCR SERPLBLD CKD-EPI 2021: 81 ML/MIN/1.73M2
EOSINOPHIL # BLD AUTO: 0.2 10E3/UL (ref 0–0.7)
EOSINOPHIL NFR BLD AUTO: 2 %
ERYTHROCYTE [DISTWIDTH] IN BLOOD BY AUTOMATED COUNT: 16.1 % (ref 10–15)
ERYTHROCYTE [DISTWIDTH] IN BLOOD BY AUTOMATED COUNT: 16.3 % (ref 10–15)
GLUCOSE SERPL-MCNC: 102 MG/DL (ref 70–99)
HCT VFR BLD AUTO: 32.9 % (ref 40–53)
HCT VFR BLD AUTO: 34 % (ref 40–53)
HGB BLD-MCNC: 10.2 G/DL (ref 13.3–17.7)
HGB BLD-MCNC: 10.6 G/DL (ref 13.3–17.7)
IMM GRANULOCYTES # BLD: 0.1 10E3/UL
IMM GRANULOCYTES NFR BLD: 1 %
LYMPHOCYTES # BLD AUTO: 3.3 10E3/UL (ref 0.8–5.3)
LYMPHOCYTES NFR BLD AUTO: 37 %
MAGNESIUM SERPL-MCNC: 2 MG/DL (ref 1.7–2.3)
MCH RBC QN AUTO: 32 PG (ref 26.5–33)
MCH RBC QN AUTO: 33 PG (ref 26.5–33)
MCHC RBC AUTO-ENTMCNC: 31 G/DL (ref 31.5–36.5)
MCHC RBC AUTO-ENTMCNC: 31.2 G/DL (ref 31.5–36.5)
MCV RBC AUTO: 103 FL (ref 78–100)
MCV RBC AUTO: 106 FL (ref 78–100)
MONOCYTES # BLD AUTO: 1 10E3/UL (ref 0–1.3)
MONOCYTES NFR BLD AUTO: 11 %
NEUTROPHILS # BLD AUTO: 4.1 10E3/UL (ref 1.6–8.3)
NEUTROPHILS NFR BLD AUTO: 48 %
NRBC # BLD AUTO: 0 10E3/UL
NRBC BLD AUTO-RTO: 0 /100
PLATELET # BLD AUTO: 257 10E3/UL (ref 150–450)
PLATELET # BLD AUTO: 258 10E3/UL (ref 150–450)
POTASSIUM SERPL-SCNC: 4.2 MMOL/L (ref 3.4–5.3)
RBC # BLD AUTO: 3.19 10E6/UL (ref 4.4–5.9)
RBC # BLD AUTO: 3.21 10E6/UL (ref 4.4–5.9)
SODIUM SERPL-SCNC: 144 MMOL/L (ref 135–145)
TACROLIMUS BLD-MCNC: 19.4 UG/L (ref 5–15)
TME LAST DOSE: ABNORMAL H
TME LAST DOSE: ABNORMAL H
WBC # BLD AUTO: 8.7 10E3/UL (ref 4–11)
WBC # BLD AUTO: 9 10E3/UL (ref 4–11)

## 2024-05-15 PROCEDURE — G0463 HOSPITAL OUTPT CLINIC VISIT: HCPCS | Mod: 25 | Performed by: NURSE PRACTITIONER

## 2024-05-15 PROCEDURE — 96413 CHEMO IV INFUSION 1 HR: CPT

## 2024-05-15 PROCEDURE — 99215 OFFICE O/P EST HI 40 MIN: CPT | Performed by: NURSE PRACTITIONER

## 2024-05-15 PROCEDURE — 36415 COLL VENOUS BLD VENIPUNCTURE: CPT | Mod: ZL

## 2024-05-15 PROCEDURE — 36415 COLL VENOUS BLD VENIPUNCTURE: CPT | Performed by: INTERNAL MEDICINE

## 2024-05-15 PROCEDURE — 83735 ASSAY OF MAGNESIUM: CPT | Mod: ZL

## 2024-05-15 PROCEDURE — 85025 COMPLETE CBC W/AUTO DIFF WBC: CPT | Mod: ZL

## 2024-05-15 PROCEDURE — 85004 AUTOMATED DIFF WBC COUNT: CPT | Performed by: INTERNAL MEDICINE

## 2024-05-15 PROCEDURE — 96415 CHEMO IV INFUSION ADDL HR: CPT

## 2024-05-15 PROCEDURE — 250N000013 HC RX MED GY IP 250 OP 250 PS 637: Performed by: INTERNAL MEDICINE

## 2024-05-15 PROCEDURE — 80197 ASSAY OF TACROLIMUS: CPT | Mod: ZL

## 2024-05-15 PROCEDURE — 80048 BASIC METABOLIC PNL TOTAL CA: CPT | Mod: ZL

## 2024-05-15 PROCEDURE — 250N000011 HC RX IP 250 OP 636: Mod: JZ | Performed by: INTERNAL MEDICINE

## 2024-05-15 PROCEDURE — 258N000003 HC RX IP 258 OP 636: Performed by: INTERNAL MEDICINE

## 2024-05-15 RX ORDER — ACETAMINOPHEN 325 MG/1
650 TABLET ORAL ONCE
Status: COMPLETED | OUTPATIENT
Start: 2024-05-15 | End: 2024-05-15

## 2024-05-15 RX ORDER — DIPHENHYDRAMINE HCL 50 MG
50 CAPSULE ORAL ONCE
Status: COMPLETED | OUTPATIENT
Start: 2024-05-15 | End: 2024-05-15

## 2024-05-15 RX ADMIN — RITUXIMAB-ABBS 700 MG: 10 INJECTION, SOLUTION INTRAVENOUS at 10:11

## 2024-05-15 RX ADMIN — ACETAMINOPHEN 650 MG: 325 TABLET, FILM COATED ORAL at 09:08

## 2024-05-15 RX ADMIN — DIPHENHYDRAMINE HYDROCHLORIDE 50 MG: 50 CAPSULE ORAL at 09:08

## 2024-05-15 RX ADMIN — SODIUM CHLORIDE 250 ML: 9 INJECTION, SOLUTION INTRAVENOUS at 09:21

## 2024-05-15 NOTE — TELEPHONE ENCOUNTER
Called Aubrey again and we were able to reschedule Mohs for 06/24.    Hi Dr. Drake,     I'm the fellow that works with Dr. Wilson. Yes, fine to undergo mohs surgery while on rituximab. I've Cc'd our  Zoe to help him get the 5/15 mohs appointment rescheduled.     Thanks!     Best,     Roberta Cao MD   Micrographic Surgery and Dermatologic Oncology (MSDO) Fellow, PGY-5   ----- Message -----   From: Amber Drake MD   Sent: 4/28/2024   3:13 PM CDT   To: Juwan Wilson MD   Subject: Mohs while getting rituximab?                    Hi Dr. Wilson,     This patient with history of lung transplant in 2013 was recently diagnosed with DLBCL/PTLD of the duodenum, for which I have started him on weekly rituximab x 4 doses. He also has some SCC's on his face (biopsied 2/6/24) that he was referred to you for and was supposed to have Mohs on 5/15/24. Is it safe to undergo Moh's while on rituximab? He was concerned about it and canceled his Derm appt but I wanted to double check with you as I have seen some skin cancers get out of hand in solid organ transplant patients.     Thanks!   Amber

## 2024-05-15 NOTE — NURSING NOTE
Infusion Nursing Note:  Aubrey Duncan presents today for Rituxan- Rapid infusion cycle 1 day 22.    Patient seen by provider today: Yes: Terri Thompson NP     present during visit today: Not Applicable.    Note: N/A.      Intravenous Access:  Labs drawn without difficulty.  Peripheral IV placed.    Treatment Conditions:  Lab Results   Component Value Date    HGB 10.6 (L) 05/15/2024    WBC 9.0 05/15/2024    ANEU 5.0 03/21/2024    ANEUTAUTO 4.1 05/15/2024     05/15/2024        Results reviewed, labs MET treatment parameters, ok to proceed with treatment.      Post Infusion Assessment:  Patient tolerated infusion without incident.  Blood return noted pre and post infusion.  Site patent and intact, free from redness, edema or discomfort.  No evidence of extravasations.  Access discontinued per protocol.  Biologic Infusion Post Education: Call the triage nurse at your clinic or seek medical attention if you have chills and/or temperature greater than or equal to 100.5, uncontrolled nausea/vomiting, diarrhea, constipation, dizziness, shortness of breath, chest pain, heart palpitations, weakness or any other new or concerning symptoms, questions or concerns.  You cannot have any live virus vaccines prior to or during treatment or up to 6 months post infusion.  If you have an upcoming surgery, medical procedure or dental procedure during treatment, this should be discussed with your ordering physician and your surgeon/dentist.  If you are having any concerning symptom, if you are unsure if you should get your next infusion or wish to speak to a provider before your next infusion, please call your care coordinator or triage nurse at your clinic to notify them so we can adequately serve you.       Discharge Plan:   Discharge instructions reviewed with: Patient.  Patient and/or family verbalized understanding of discharge instructions and all questions answered.  AVS to patient via [employer].  Patient  will return as needed, has Follow-up scheduled with [facility] provider for next appointment.   Patient discharged in stable condition accompanied by: self.  Departure Mode: Ambulatory.      Kenia Rodriguez RN

## 2024-05-15 NOTE — TELEPHONE ENCOUNTER
Hi Dr. Drake,     I'm the fellow that works with Dr. Wilson. Yes, fine to undergo mohs surgery while on rituximab. I've Cc'd our  Zoe to help him get the 5/15 mohs appointment rescheduled.     Thanks!     Best,     Roberta Cao MD   Micrographic Surgery and Dermatologic Oncology (MSDO) Fellow, PGY-5   ----- Message -----   From: Amber Drake MD   Sent: 4/28/2024   3:13 PM CDT   To: Juwan Wilson MD   Subject: Mohs while getting rituximab?                    Hi Dr. Wilson,     This patient with history of lung transplant in 2013 was recently diagnosed with DLBCL/PTLD of the duodenum, for which I have started him on weekly rituximab x 4 doses. He also has some SCC's on his face (biopsied 2/6/24) that he was referred to you for and was supposed to have Mohs on 5/15/24. Is it safe to undergo Moh's while on rituximab? He was concerned about it and canceled his Derm appt but I wanted to double check with you as I have seen some skin cancers get out of hand in solid organ transplant patients.     Thanks!   Amber

## 2024-05-15 NOTE — NURSING NOTE
Patient is being seen by provider in infusion: part of rooming assessments and vitals were done by the infusion RN.    Nataliya Silva CMA ([facility])................ 5/15/2024 8:08 AM
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
## 2024-05-16 ENCOUNTER — OFFICE VISIT (OUTPATIENT)
Dept: PULMONOLOGY | Facility: CLINIC | Age: 69
End: 2024-05-16
Attending: INTERNAL MEDICINE
Payer: MEDICARE

## 2024-05-16 ENCOUNTER — ANCILLARY PROCEDURE (OUTPATIENT)
Dept: GENERAL RADIOLOGY | Facility: CLINIC | Age: 69
End: 2024-05-16
Attending: INTERNAL MEDICINE
Payer: MEDICARE

## 2024-05-16 ENCOUNTER — LAB (OUTPATIENT)
Dept: LAB | Facility: CLINIC | Age: 69
End: 2024-05-16
Attending: INTERNAL MEDICINE
Payer: MEDICARE

## 2024-05-16 ENCOUNTER — OFFICE VISIT (OUTPATIENT)
Dept: TRANSPLANT | Facility: CLINIC | Age: 69
End: 2024-05-16
Attending: INTERNAL MEDICINE
Payer: MEDICARE

## 2024-05-16 VITALS
SYSTOLIC BLOOD PRESSURE: 164 MMHG | TEMPERATURE: 98.2 F | BODY MASS INDEX: 25.96 KG/M2 | HEIGHT: 68 IN | HEART RATE: 85 BPM | WEIGHT: 171.3 LBS | DIASTOLIC BLOOD PRESSURE: 87 MMHG | OXYGEN SATURATION: 95 % | RESPIRATION RATE: 18 BRPM

## 2024-05-16 DIAGNOSIS — Z94.2 TRANSPLANTED, LUNG (H): ICD-10-CM

## 2024-05-16 DIAGNOSIS — Z94.2 S/P LUNG TRANSPLANT (H): ICD-10-CM

## 2024-05-16 DIAGNOSIS — E11.51 DIABETES MELLITUS WITH PERIPHERAL VASCULAR DISEASE (H): ICD-10-CM

## 2024-05-16 DIAGNOSIS — Z94.2 LUNG REPLACED BY TRANSPLANT (H): ICD-10-CM

## 2024-05-16 DIAGNOSIS — M79.671 BILATERAL FOOT PAIN: ICD-10-CM

## 2024-05-16 DIAGNOSIS — M79.672 BILATERAL FOOT PAIN: ICD-10-CM

## 2024-05-16 LAB
ANION GAP SERPL CALCULATED.3IONS-SCNC: 9 MMOL/L (ref 7–15)
BUN SERPL-MCNC: 29.2 MG/DL (ref 8–23)
CALCIUM SERPL-MCNC: 8.1 MG/DL (ref 8.8–10.2)
CHLORIDE SERPL-SCNC: 109 MMOL/L (ref 98–107)
CMV DNA SPEC NAA+PROBE-ACNC: NOT DETECTED IU/ML
CMV DNA SPEC NAA+PROBE-ACNC: NOT DETECTED IU/ML
CREAT SERPL-MCNC: 1.15 MG/DL (ref 0.67–1.17)
CREAT UR-MCNC: 77.7 MG/DL
DEPRECATED HCO3 PLAS-SCNC: 24 MMOL/L (ref 22–29)
EGFRCR SERPLBLD CKD-EPI 2021: 68 ML/MIN/1.73M2
ERYTHROCYTE [DISTWIDTH] IN BLOOD BY AUTOMATED COUNT: 16.5 % (ref 10–15)
EXPTIME-PRE: 6.57 SEC
FEF2575-%PRED-PRE: 174 %
FEF2575-PRE: 3.8 L/SEC
FEF2575-PRED: 2.18 L/SEC
FEFMAX-%PRED-PRE: 121 %
FEFMAX-PRE: 9.5 L/SEC
FEFMAX-PRED: 7.83 L/SEC
FEV1-%PRED-PRE: 108 %
FEV1-PRE: 3.03 L
FEV1FEV6-PRE: 83 %
FEV1FEV6-PRED: 78 %
FEV1FVC-PRE: 82 %
FEV1FVC-PRED: 77 %
FIFMAX-PRE: 6.98 L/SEC
FVC-%PRED-PRE: 101 %
FVC-PRE: 3.69 L
FVC-PRED: 3.62 L
GLUCOSE SERPL-MCNC: 105 MG/DL (ref 70–99)
HCT VFR BLD AUTO: 32.3 % (ref 40–53)
HGB BLD-MCNC: 10.3 G/DL (ref 13.3–17.7)
HOLD SPECIMEN: NORMAL
MAGNESIUM SERPL-MCNC: 1.9 MG/DL (ref 1.7–2.3)
MCH RBC QN AUTO: 33.8 PG (ref 26.5–33)
MCHC RBC AUTO-ENTMCNC: 31.9 G/DL (ref 31.5–36.5)
MCV RBC AUTO: 106 FL (ref 78–100)
MICROALBUMIN UR-MCNC: <12 MG/L
MICROALBUMIN/CREAT UR: NORMAL MG/G{CREAT}
PLATELET # BLD AUTO: 284 10E3/UL (ref 150–450)
POTASSIUM SERPL-SCNC: 4 MMOL/L (ref 3.4–5.3)
RBC # BLD AUTO: 3.05 10E6/UL (ref 4.4–5.9)
SODIUM SERPL-SCNC: 142 MMOL/L (ref 135–145)
TACROLIMUS BLD-MCNC: 10.3 UG/L (ref 5–15)
TME LAST DOSE: NORMAL H
TME LAST DOSE: NORMAL H
WBC # BLD AUTO: 11.6 10E3/UL (ref 4–11)

## 2024-05-16 PROCEDURE — 71046 X-RAY EXAM CHEST 2 VIEWS: CPT | Mod: GC | Performed by: RADIOLOGY

## 2024-05-16 PROCEDURE — 85027 COMPLETE CBC AUTOMATED: CPT | Performed by: PATHOLOGY

## 2024-05-16 PROCEDURE — 94375 RESPIRATORY FLOW VOLUME LOOP: CPT | Performed by: INTERNAL MEDICINE

## 2024-05-16 PROCEDURE — 99214 OFFICE O/P EST MOD 30 MIN: CPT | Mod: 25 | Performed by: INTERNAL MEDICINE

## 2024-05-16 PROCEDURE — G0463 HOSPITAL OUTPT CLINIC VISIT: HCPCS | Performed by: INTERNAL MEDICINE

## 2024-05-16 PROCEDURE — 80197 ASSAY OF TACROLIMUS: CPT | Performed by: INTERNAL MEDICINE

## 2024-05-16 PROCEDURE — 99000 SPECIMEN HANDLING OFFICE-LAB: CPT | Performed by: PATHOLOGY

## 2024-05-16 PROCEDURE — 80048 BASIC METABOLIC PNL TOTAL CA: CPT | Performed by: PATHOLOGY

## 2024-05-16 PROCEDURE — 82043 UR ALBUMIN QUANTITATIVE: CPT | Performed by: NURSE PRACTITIONER

## 2024-05-16 PROCEDURE — 36415 COLL VENOUS BLD VENIPUNCTURE: CPT | Performed by: PATHOLOGY

## 2024-05-16 PROCEDURE — 83735 ASSAY OF MAGNESIUM: CPT | Performed by: PATHOLOGY

## 2024-05-16 RX ORDER — GABAPENTIN 100 MG/1
200 CAPSULE ORAL 3 TIMES DAILY
COMMUNITY
Start: 2024-05-16 | End: 2024-05-20

## 2024-05-16 ASSESSMENT — PAIN SCALES - GENERAL: PAINLEVEL: MODERATE PAIN (4)

## 2024-05-16 NOTE — PATIENT INSTRUCTIONS
- Chronic steroid use is a risk factor for bone mineral density loss, or thinning of the bones.  This condition puts you at risk for fractures.   - It is recommended that you consume 1,500mg of Calcium daily.   - The main dietary sources of calcium include milk and other dairy products, such as cottage cheese, yogurt, and hard cheese, and green vegetables, such as kale and broccoli.  A rough method of estimating your dietary calcium intake is to multiply the number of dairy servings you consume each day by 300 mg. Examples of a serving include 8 oz of milk (236 mL) or yogurt (224 g), 1 oz (28 g) of hard cheese, or 16 oz (448 g) of cottage cheese.  The table below lists common food and drinks with calcium.    Food Calcium (mg)   Milk (skim, 2%, or whole; 8 oz [240 mL])  300   Yogurt (6 oz [168 g])  250   Orange juice (with calcium; 8 oz [240 mL])  300   Tofu with calcium  435   Cheese (1 oz [28 g])  (hard cheese = higher calcium) 195-335   Cottage cheese (0.5 cup [113 g]) 130   Ice cream or frozen yogurt (0.5 cup [113 g]) 100   Soy milk (8 oz [240 mL])  300   Beans (0.5 cup cooked [113 g])  60-80   Dark, leafy green vegetables (0.5 cup cooked [113 g])     Almonds (24 whole) 70   Orange (1 medium)  60     -You can use Ayr gel for nasal discomfort

## 2024-05-16 NOTE — PROGRESS NOTES
Transplant Coordinator Note    Reason for visit: Post lung transplant follow up visit   Coordinator: Present   Caregiver:      Health concerns addressed today:  1. Resp: Breathing feels stable. No cough  2. ENT: at baseline.   3. GI: Diarrhea improved. 1-2 BM's a day  4. Took tacrolimus before lab draw yesterday. Today's draw will be a 14 hour level.   5. Sores on both feet that are not healing. Neuropathy getting worse. Getting a new ultrasound with vascular on the 21th.   6. Last dose of ritx yesterday. PET scan in about a month again.     Broken sternal wire, no popping or clicking     Activity/rehab:   Oxygen needs:   Pain management/RX:   Diabetic management:   Next Bronch due:   High risk donor:   Risk Criteria Labs:   CMV status:  Valcyte stopped:   EBV status:  DVT/PE:  Post op AFIB/follow up with EP:  AC/asa:   PJP prophylactic:     COVID:  COVID-19 infection (yes/no, date of most recent positive test):   Status/instructions given about COVID-19 vaccine:     Pt Education: medications (use/dose/side effects), how/when to call coordinator, frequency of labs, s/s of infection/rejection, call prior to starting any new medications, lab/vital sign book    Health Maintenance:   Last colonoscopy: 9/2023  Next colonoscopy due: 2028  Dermatology: 6/24  Vaccinations this visit:     Labs, CXR, PFTs reviewed with patient  Medication record reviewed and reconciled  Questions and concerns addressed    Patient Instructions  1. Continue to hydrate with 60-70 oz fluids daily.  2. Continue to exercise daily or most days of the week.  3. Follow up with your primary care provider for annual gender health maintenance procedures.  4. Follow up with colonoscopy schedule.  5. Follow up with annual dermatology visits.  6. It doesn't seem like the COVID vaccine is working well in lung transplant patients. A number of lung transplant patients have gotten sick with COVID even after receiving the vaccines. Based on our recent  experience, it can be life-threatening to get COVID  even after being vaccinated. Please continue to act like you did not get the COVID vaccine - social distancing, wearing a mask, good hand hygiene, etc. If the people around you are vaccinated, it will help reduce the risk of you getting COVID. All members of your household should be vaccinated.  7. Send [employer] message with how much copper you are taking.     Next transplant clinic appointment:  with CXR, labs and PFTs  Next lab draw:     AVS printed at time of check out

## 2024-05-16 NOTE — NURSING NOTE
"Chief Complaint   Patient presents with    RECHECK     post lung transplant 9/8/2013     Vital signs:  Temp: 98.2  F (36.8  C) Temp src: Oral BP: (!) 164/87 Pulse: 85   Resp: 18 SpO2: 95 % (RA)     Height: 172.7 cm (5' 8\") Weight: 77.7 kg (171 lb 4.8 oz)  Estimated body mass index is 26.05 kg/m  as calculated from the following:    Height as of this encounter: 1.727 m (5' 8\").    Weight as of this encounter: 77.7 kg (171 lb 4.8 oz).      Petrona Zuñiga, [facility]  5/16/2024 11:47 AM    "

## 2024-05-16 NOTE — LETTER
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 the COVID vaccine is working well in lung transplant patients. A number of lung transplant patients have gotten sick with COVID even after receiving the vaccines. Based on our recent experience, it can be life-threatening to get COVID  even after being vaccinated. Please continue to act like you did not get the COVID vaccine - social distancing, wearing a mask, good hand hygiene, etc. If the people around you are vaccinated, it will help reduce the risk of you getting COVID. All members of your household should be vaccinated.  7. Send Devante a Oversee message with how much copper you are taking.     Next transplant clinic appointment:  with CXR, labs and PFTs  Next lab draw:     AVS printed at time of check out        Again, thank you for allowing me to participate in the care of your patient.        Sincerely,        Alma Murphy MD

## 2024-05-19 ENCOUNTER — MYC MEDICAL ADVICE (OUTPATIENT)
Dept: FAMILY MEDICINE | Facility: OTHER | Age: 69
End: 2024-05-19
Payer: MEDICARE

## 2024-05-19 DIAGNOSIS — E11.51 DIABETES MELLITUS WITH PERIPHERAL VASCULAR DISEASE (H): ICD-10-CM

## 2024-05-19 DIAGNOSIS — M79.671 BILATERAL FOOT PAIN: ICD-10-CM

## 2024-05-19 DIAGNOSIS — M79.672 BILATERAL FOOT PAIN: ICD-10-CM

## 2024-05-20 DIAGNOSIS — Z94.2 S/P LUNG TRANSPLANT (H): Primary | ICD-10-CM

## 2024-05-20 NOTE — TELEPHONE ENCOUNTER
Tushar'd up Gabapentin 200mg TID.   Routing to PCP for review on Tuesday.      Patient update on MyChart.    Svitlana Potts RN on 5/20/2024 at 9:51 AM

## 2024-05-21 ENCOUNTER — ANCILLARY PROCEDURE (OUTPATIENT)
Dept: VASCULAR ULTRASOUND | Facility: CLINIC | Age: 69
End: 2024-05-21
Attending: HOSPITALIST
Payer: MEDICARE

## 2024-05-21 ENCOUNTER — OFFICE VISIT (OUTPATIENT)
Dept: VASCULAR SURGERY | Facility: CLINIC | Age: 69
End: 2024-05-21
Attending: HOSPITALIST
Payer: MEDICARE

## 2024-05-21 VITALS
RESPIRATION RATE: 18 BRPM | DIASTOLIC BLOOD PRESSURE: 75 MMHG | HEART RATE: 86 BPM | SYSTOLIC BLOOD PRESSURE: 154 MMHG | TEMPERATURE: 97.6 F | OXYGEN SATURATION: 94 %

## 2024-05-21 DIAGNOSIS — I70.219 INTERMITTENT CLAUDICATION DUE TO ATHEROSCLEROSIS OF ARTERY OF EXTREMITY (H): ICD-10-CM

## 2024-05-21 DIAGNOSIS — I70.223 CRITICAL LIMB ISCHEMIA OF BOTH LOWER EXTREMITIES (H): Primary | ICD-10-CM

## 2024-05-21 DIAGNOSIS — I70.212 ATHEROSCLEROSIS OF NATIVE ARTERIES OF EXTREMITIES WITH INTERMITTENT CLAUDICATION, LEFT LEG (H): ICD-10-CM

## 2024-05-21 PROCEDURE — 93925 LOWER EXTREMITY STUDY: CPT

## 2024-05-21 PROCEDURE — 93925 LOWER EXTREMITY STUDY: CPT | Mod: 26 | Performed by: SURGERY

## 2024-05-21 PROCEDURE — 99214 OFFICE O/P EST MOD 30 MIN: CPT | Performed by: HOSPITALIST

## 2024-05-21 PROCEDURE — G0463 HOSPITAL OUTPT CLINIC VISIT: HCPCS | Mod: 25 | Performed by: HOSPITALIST

## 2024-05-21 RX ORDER — GABAPENTIN 100 MG/1
200 CAPSULE ORAL 3 TIMES DAILY
Qty: 180 CAPSULE | Refills: 0 | Status: SHIPPED | OUTPATIENT
Start: 2024-05-21 | End: 2024-06-06 | Stop reason: DRUGHIGH

## 2024-05-21 ASSESSMENT — PAIN SCALES - GENERAL: PAINLEVEL: MODERATE PAIN (4)

## 2024-05-21 NOTE — TELEPHONE ENCOUNTER
Order signed for 30 days-will address further at upcoming appointment. BRANDI Manning CNP on 5/21/2024 at 10:14 AM

## 2024-05-21 NOTE — PROGRESS NOTES
[facility]        Patient is here for a follow up  to discuss Peripheral artery disease (PAD). Symptoms include non-healing wounds on bilateral feet and claudication.  Patient states he gets pain in his calf on bilateral legs that resolves with rest.     Pt is currently taking Aspirin, Statin, and Plavix.    BP (!) 154/75   Pulse 86   Temp 97.6  F (36.4  C)   Resp 18   SpO2 94%     The provider has been notified that the patient has concerns of claudication and non-healing wounds.     Questions patient would like addressed today are: N/A.    Refills are needed: No    Has homecare services and agency name:  No

## 2024-05-21 NOTE — PATIENT INSTRUCTIONS
Thomas Burgos,    Thank you for entrusting your care with us today. After your visit today with MD Derick Carballo this is the plan that was discussed at your appointment.    Do not use ace wrap for compression.  Use tubular compression size F.      Follow up with Dr. Sneed to discuss interventions to help your blood flow.  This has been scheduled.       I am including additional information on these things and our contact information if you have any questions or concerns.   Please do not hesitate to reach out to us if you felt we did not answer your questions or you are unsure of the treatment plan after your visit today. Our number is [phone].Thank you for trusting us with your care.         Again thank you for your time.

## 2024-05-23 NOTE — PROGRESS NOTES
VASCULAR MEDICINE PROGRESS NOTE          LOCATION:  [facility]       Date of Service: 5/21/2024      Primary Care Provider: Hoa Olivarez      Reason for the visit/chief complaint:   Follow up on PAD      Subjective:  Aubrey Duncan is a pleasant 68 year old male who presents to our Vascular Medicine clinic to follow-up.  He is accompanied by his spouse Kyung.    I last met with Mr. Duncan to follow-up on PAD virtually on 4/19/2024.  He has complex medical history as detailed in my previous documentation.    In brief, he had longstanding history of right-sided intermittent claudication that was found to be unreconstructable after angiogram done by Dr. Boyd August 2022.  He was managed conservatively and with walking program with improvement and stabilization his symptoms.  He then went to develop unstable angina requiring staged cardiac stenting between January and February 2024.  Also developed GI bleeding while on DAPT that was managed conservatively and he was able to restart DAPT.    While in cardiac rehab, he started noticing new left sided intermittent claudication.  He previously never had issues with his left lower extremity.  I then met with him virtually on 4/19/2024 to discuss this new finding.  Repeat TC study shows noncompressible vessels his TBI however was 0.31 on the left and 0.19 on the right.  Toe pressure on the left was 44 mmHg.  Patient shared that he has developed sores in both lower extremities.  He has been in follow-up with podiatry/Dr. Rosales at [facility].  Due to limitation in evaluating his lower extremity wounds during our video visit, I asked for urgent evaluation and person to assess if this indeed represents critical limb ischemia and arterial ulceration.    Today, he is here for this purpose.  Continues to report intermittent claudication of both lower extremities although  does not appear to be life limiting or short distance at this point.  His lower  extremity wounds however are not healing with some of them getting bigger.  No pain at rest.    No fever, chills or feeling sick in general.        Past medical history, surgical history, medications, family history, social history and allergies were reviewed. Pertinent points mentioned under HPI.        OBJECTIVE:    Vital signs:  BP (!) 154/75   Pulse 86   Temp 97.6  F (36.4  C)   Resp 18   SpO2 94%   Wt Readings from Last 1 Encounters:   05/16/24 171 lb 4.8 oz (77.7 kg)     There is no height or weight on file to calculate BMI.    Physical exam:  General appearance: Pleasant male in no apparent distress.    HEENT: NC/AT.    Neck: Carotids +2/2 bilaterally, right carotid bruit heard. No jugular venous distension.   Heart: RRR. Normal S1, S2.   Chest: Clear to auscultation bilaterally.  Abdomen: Soft, nontender, nondistended. No pulsatile mass.  No bruits.   Extremities: Unable to feel distal pulsation consistent with prior exam.  Dopplerable with monophasic waveforms.  Slight dependent rubor seen with elevation pallor.  Skin: Multiple wounds noted including right hallux and bilateral fifth toe dorsal wounds.  See pictures below  Neurological: Alert, awake and oriented     Right hallux        Right fifth toe      Left fifth toe        DIAGNOSTIC STUDIES:   Labs and diagnostics reviewed including outside records. Pertinent points are mentioned under HPI and assessment and plan sections.        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  Comparing multiple pictures in the chart taken by podiatry/Dr. Rosales including pictures from 3 weeks ago, some of these ulcers have grown in size.  Recent TC showed noncompressible vessels as above with severely depressed TBI on the right 0.19 and 0.31 on the left with toe pressure 27 mmHg and 44 mmHg on the right and left respectively.  Post exercise, time to recovery of pressure was 9 minutes on the right and 7 minutes on the left.   Arterial duplex ultrasound from today shows monophasic waveforms starting at the distal SFA level on the right and proximal SFA on the left with more than 70% stenosis in the profunda femoris and proximal SFA.  Right popliteal artery is occluded.    They are from far away and between [facility], they prefer to see a vascular surgeon here.      Recommendations:  Referral to vascular surgery Dr. Sneed on an urgent basis.  This was already scheduled before patient's departure.  Continue current medical management.  Well optimized on DAPT and high intensity statin with LDL 47.  Continue to keep both feet warm.  Continue the same dressings as previously suggested by podiatry.  Do not apply compression wraps to bilateral lower extremities.  Could use tubular compression with only 1 layer to control the edema.    It was a pleasure meeting again with Mr. Duncan and his wife in our clinic today.    Derick Carballo MD  Vascular Medicine  May 23, 2024

## 2024-05-30 ENCOUNTER — OFFICE VISIT (OUTPATIENT)
Dept: VASCULAR SURGERY | Facility: CLINIC | Age: 69
End: 2024-05-30
Attending: SURGERY
Payer: MEDICARE

## 2024-05-30 VITALS — RESPIRATION RATE: 16 BRPM | SYSTOLIC BLOOD PRESSURE: 122 MMHG | DIASTOLIC BLOOD PRESSURE: 60 MMHG

## 2024-05-30 DIAGNOSIS — I70.211 ATHEROSCLEROSIS OF NATIVE ARTERIES OF EXTREMITIES WITH INTERMITTENT CLAUDICATION, RIGHT LEG (H): ICD-10-CM

## 2024-05-30 DIAGNOSIS — I70.212 ATHEROSCLEROSIS OF NATIVE ARTERIES OF EXTREMITIES WITH INTERMITTENT CLAUDICATION, LEFT LEG (H): Primary | ICD-10-CM

## 2024-05-30 PROCEDURE — 99214 OFFICE O/P EST MOD 30 MIN: CPT | Performed by: SURGERY

## 2024-05-30 PROCEDURE — G0463 HOSPITAL OUTPT CLINIC VISIT: HCPCS | Performed by: SURGERY

## 2024-05-30 ASSESSMENT — PAIN SCALES - GENERAL: PAINLEVEL: MODERATE PAIN (4)

## 2024-05-30 NOTE — PATIENT INSTRUCTIONS
Aubrey,  Your visit to [facility] for your procedure is coming soon and we look forward to seeing you! This friendly reminder and pre-procedure checklist will help to ensure your procedure goes smoothly and meets your expectations. At [facility], our goal is to provide you with a great patient experience and to deliver genuine, professional care to every patient.     Please complete all the steps in advance of your visit. If you have any questions about the items listed below, please give our office a call. We can be reached at [phone] or visit our website at [email or web address] for more information.     Procedure: Left  Leg  Angiogram   Procedure Date :  TBD  Arrival Time: TBD  Procedure Time :  TBD  Admission Type: Outpatient  Surgeon:   Procedure Location: [facility]      Prepare for the peripheral angiography as follows:  Do not eat 8 hours before your procedure. You may have clear liquids up to 1 hour before surgery.  Tell your healthcare provider about all medicines you take and any allergies you may have.  Arrange for a family member or friend to drive you home.  If you are on Metformin, please HOLD for 48 hours after the procedure.  Please be sure to address your diabetic medications with your Primary Care Physician prior to your angiogram.    Aspirin and Plavix: PLEASE DO NOT STOP THIS MEDICATION PRIOR TO SURGERY/ PROCEDURE.     If you take these diabetic medications, please discuss with your primary doctor and follow the hold instructions:   Hold seven (7) days prior for once weekly injectable doses [semaglutide (Ozempic, Wegovy), dulaglutide  (Trulicity), exenatide ER (Bydureon), tirzepatide (Mounjaro)]  Hold the day before and day of for once daily injectable GLP-1 agonists [exenatide (Byetta), liraglutide (Saxenda,Victoza)]  Hold seven (7) days for oral semaglutide (Rybelsus)        Peripheral  Angiography    Peripheral angiography is an outpatient procedure that makes a  map  of the vessels (arteries) in your lower body, legs, and arms, using X-ray and dye.This map can show where blood flow may be blocked.    An angiogram is commonly performed under sedation with the use of local anesthesia.    The procedure usually starts with a needle put into the femoral (groin) artery. From one treatment site, areas all over the body can be treated.  After access is established, catheters (thin tubes) and wires are threaded through the arterial system to a specific area of interest or throughout the entire body.  As a contrast agent (iodine dye) is injected, X-ray images are taken to let your provider view the flow of the dye and identify blockages. The surgeon can then choose the best mode of therapy for you - whether during or following the angiogram. This decision depends on your symptoms and the severity and characteristics of the blockages.  Two common therapies that can be provided during the angiogram are balloon angioplasty and stent placement.                   Angioplasty can be used to open arterial blockages. Guided by X-ray, your provider navigates through the blockage with a wire and introduces a special device equipped with an inflatable balloon. After positioning the balloon device across the blocked portion of the artery, the provider inflates the balloon to expand the artery and compress the blockage. The balloon is then deflated and removed while keeping the wire in place across the area that has been treated. Next, contrast dye is injected to assess the result. Treatment is considered a success if blood flow is improved and less than 30% of the blockage remains. If the vessel is still considerably narrowed, placing a stent may be the next step.    Stents are used to prop open an artery at the site of a narrowing. Stents are generally placed after balloon angioplasty when there is residual narrowing  or insufficient blood flow in a treated vessel. Stents are considered a permanent implant and cannot be used if you have a metal allergy. Stents that are used in the leg are constructed of a nickel-titanium alloy (Nitinol), a memory-shaped metal. This alloy has a predetermined size and shape at body temperature and expands to this size and shape after being introduced through a catheter. These stents resist kinking and are flexible so that damage from activities that involve your legs is minimized.  Your procedure may require other techniques to address the problem or plaque.     If surgery is felt to be a better option, your vascular provider will obtain any additional X-ray images needed to plan a surgical bypass of the blocked vessel/s and will then conclude the angiogram.      During the procedure  Here is what to expect:  You may get medicine through an IV (intravenous) line to relax you. You re given an injection to numb the insertion site. Then, a tiny skin cut (incision) is made near an artery in your groin.  Your provider inserts a thin tube (catheter) through the incision. He or she then threads the catheter into an artery while looking at a video monitor.  Contrast  dye  is injected into the catheter to confirm position. You may feel warmth or pressure in your legs and back. You lie still as X-rays are taken. The catheter is then taken out.  After the procedure  You ll be taken to a recovery area. A healthcare provider will apply pressure to the site for about 10 minutes. Your healthcare provider will tell you how long to lie down and keep the insertion site still. Your healthcare provider will discuss the results with you soon after the procedure.      Angiogram Procedure Discharge Instructions:     1. If you received sedation for your procedure: Do not drive or operate heavy machinery for the rest of the day.  2. Avoid strenuous activity for 72 hours (3 days):                        - Do not lift  greater than 10 pounds.                        - Excessive exercise                        - Straining                        - Return to your normal activities as you tolerate after the 3 days restriction  3. Avoid tub baths, Jacuzzis, hot tubs and pools for 72 hours (3 days) or until puncture site is will healed.  4. You may shower beginning tomorrow. Do not scrub puncture site(s) until well healed, pat dry.  5. You can expect to return to work 1-2 days after your procedure - depending on the nature of your profession.  6. It is normal to have some tenderness and minimal swelling at puncture site. A small area of discoloration may be present. Tenderness typically subsides in 24-48 hours. A small knot may also be present at puncture site for 6-8 weeks, this can be a normal part of the healing process.       After the angiogram If you:      1. Experience any bleeding or active swelling from puncture site: Lie down, firmly apply pressure to puncture site and CALL 9-1-1  2. Fever greater than 101 degrees Fahrenheit.  3. Redness, swelling, warmth to touch, or purulent (yellow/green/foul smelling) drainage from the puncture site.  4. Increasing pain, tenderness or swelling at puncture site OR of arm/leg near puncture site.  5. Feeling weak or faint.  6. Change in color, temperature, or sensation of arm/leg where puncture was made.  7. You can t feel your thrill (pulse at your dialysis fistula site) or it feels weak (If you had fistulogram done).     Call us with any other questions or concerns after your procedure: [phone]      All invasive procedures can have complications. While the risk of an angiogram is low it is not zero. The most common complications are related to the arterial access site.       Risks/ Complications   Bruising is Common  You will likely have bruising (ecchymosis) where the artery was entered.    Pain and Bleeding  Less commonly, patients experience pain and bleeding that may include blood  "collecting under the skin (hematoma).    Blockage and Leakage   In rare cases, the access artery can become blocked. Infrequently, patients experience persistent leakage of blood where the artery was entered, which can result in the formation of a pseudoaneurysm--a blood-filled sac--that may require further treatment.  Other complications related to an angiogram include:   Allergic reaction to the iodine contrast dye, which can lead to the development of kidney failure.  Very rarely during balloon angioplasty and/or stent placement, part of the arterial blockage can break off (embolism) and travel to more distant arteries. This can worsen blood flow.    Pre-Procedure checklist:    [] A Pre-op physical within 30 days of the procedure is required. You will need to set up an appointment with your primary care provider.  [] Contact your insurance regarding coverage  If you would like a Good Natalya Estimate for your upcoming procedure at [facility] Location, contact Cost of Care Estimates   Advocates are available Monday through Friday 8am - 5pm   [phone]  You may also submit a request online at [email or web address]  - Complete the secure online form found under \"Services and Procedure Pricing\"   If your procedure is at [facility], please contact the numbers below for Cost of Care Estimates.   - Facility Charge: [phone]    Anesthesiology charge:  [phone]   [] DO NOT BRING FMLA WITH TO SURGERY.  These should be sent to the provider's office by fax to [phone].     [] Day of Surgery  Medications - Take as indicated with sips of water.   Wear comfortable loose-fitting clothes. Wear your glasses-Not contacts. Do not wear jewelry and remove body piercings. Surgery may be cancelled if they are not removed.   You may have 1 family member wait in the lobby during your surgery. Visitor restrictions are subject to change. Please verify with the surgery nurse when they call. "   If same day surgery-Have a someone come with you to surgery that can help you understand the surgeon's instructions, drive you home and stay with you overnight the first night.    [] You will receive a call from a nurse 1-3 days prior to the procedure. They will go over more details with you    Notify our office right away, if you have any changes in your health status, or if you develop a cold, flu, diarrhea, infection, fever or sore throat before your scheduled surgery date. We can be reached at  [phone] Monday-Friday 8 am-4:30 pm if you have any questions.   Thank you for choosing [facility]    Please scan QR codes to watch videos about Angioplasty for Peripheral Artery Disease. There are links provided if you are unable to use the code.                                                                                                                                       [email or web address]                    [email or web address]  Angioplasty for Peripheral Artery Disease: Before Your Procedure                                               Angioplasty for Peripheral Artery Disease: Returning Home

## 2024-05-30 NOTE — PROGRESS NOTES
[facility]        Patient is here for a consult to discuss Peripheral artery disease (PAD). Symptoms include claudicaton: right calf and left calf, and feet at distance of pretty instant in their feet, non-healing wounds, numbness, weakness, skin discoloration.  PAD, saw AA, worsening toe wounds on bilateral feet, intermittent claudication       Pt is currently taking Aspirin, Statin, and Plavix.      /60   Resp 16     The provider has been notified that the patient has no concerns.     Questions patient would like addressed today are: N/A.    Refills are needed: N/A    Has homecare services and agency name:  No
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
## 2024-06-01 DIAGNOSIS — E11.9 DIABETES MELLITUS TYPE 2, DIET-CONTROLLED (H): ICD-10-CM

## 2024-06-04 RX ORDER — LANCETS
EACH MISCELLANEOUS
Qty: 400 EACH | Refills: 1 | Status: SHIPPED | OUTPATIENT
Start: 2024-06-04

## 2024-06-04 NOTE — TELEPHONE ENCOUNTER
[facility] sent Rx request for the following:      Requested Prescriptions   Pending Prescriptions Disp Refills    Microlet Lancets MISC [Pharmacy Med Name: MICROLET LANCET]  1     Sig: CHECK BLOOD SUGAR FOUR TIMES DAILY E11.9       Diabetic Supplies Protocol Passed - 6/4/2024  1:34 PM   Last Prescription Date:   9/19/23  Last Fill Qty/Refills:         400, R-1    Last Office Visit:              3/28/24   Future Office visit:             Next 5 appointments (look out 90 days)      Jun 06, 2024  2:00 PM  (Arrive by 1:45 PM)  Provider Visit with [facility] ([facility] ) [address]  [place] [phone]         Pharmacy comment:  Pt states he tests3 times a day. Testing above 3 times a dayrequires extra documentation to bill MEDICARE. Might you be willing to send a new RX for TID testing? PLEASE AND THANKS!    Tessa Juan RN on 6/4/2024 at 1:36 PM

## 2024-06-05 ENCOUNTER — HOSPITAL ENCOUNTER (OUTPATIENT)
Facility: CLINIC | Age: 69
Discharge: HOME | End: 2024-06-05
Attending: INTERNAL MEDICINE | Admitting: INTERNAL MEDICINE
Payer: MEDICARE

## 2024-06-05 ENCOUNTER — ANESTHESIA (OUTPATIENT)
Dept: SURGERY | Facility: CLINIC | Age: 69
End: 2024-06-05
Payer: MEDICARE

## 2024-06-05 ENCOUNTER — ANESTHESIA EVENT (OUTPATIENT)
Dept: SURGERY | Facility: CLINIC | Age: 69
End: 2024-06-05
Payer: MEDICARE

## 2024-06-05 VITALS
HEART RATE: 79 BPM | OXYGEN SATURATION: 97 % | DIASTOLIC BLOOD PRESSURE: 82 MMHG | TEMPERATURE: 97.8 F | HEIGHT: 68 IN | SYSTOLIC BLOOD PRESSURE: 134 MMHG | WEIGHT: 168.87 LBS | BODY MASS INDEX: 25.59 KG/M2 | RESPIRATION RATE: 14 BRPM

## 2024-06-05 DIAGNOSIS — K86.2 PANCREAS CYST: Primary | ICD-10-CM

## 2024-06-05 LAB
AMYLASE FLD-CCNC: 7471 U/L
CEA FLD-MCNC: 88.8 NG/ML
GLUCOSE BLDC GLUCOMTR-MCNC: 95 MG/DL (ref 70–99)
GLUCOSE FLD-MCNC: <2 MG/DL
UPPER EUS: NORMAL

## 2024-06-05 PROCEDURE — 250N000011 HC RX IP 250 OP 636: Performed by: NURSE ANESTHETIST, CERTIFIED REGISTERED

## 2024-06-05 PROCEDURE — 88313 SPECIAL STAINS GROUP 2: CPT | Mod: TC | Performed by: INTERNAL MEDICINE

## 2024-06-05 PROCEDURE — 710N000012 HC RECOVERY PHASE 2, PER MINUTE: Performed by: INTERNAL MEDICINE

## 2024-06-05 PROCEDURE — 999N000141 HC STATISTIC PRE-PROCEDURE NURSING ASSESSMENT: Performed by: INTERNAL MEDICINE

## 2024-06-05 PROCEDURE — 88108 CYTOPATH CONCENTRATE TECH: CPT | Mod: 26 | Performed by: PATHOLOGY

## 2024-06-05 PROCEDURE — 250N000009 HC RX 250: Performed by: NURSE ANESTHETIST, CERTIFIED REGISTERED

## 2024-06-05 PROCEDURE — 250N000009 HC RX 250: Performed by: INTERNAL MEDICINE

## 2024-06-05 PROCEDURE — 360N000075 HC SURGERY LEVEL 2, PER MIN: Performed by: INTERNAL MEDICINE

## 2024-06-05 PROCEDURE — 82962 GLUCOSE BLOOD TEST: CPT

## 2024-06-05 PROCEDURE — 43238 EGD US FINE NEEDLE BX/ASPIR: CPT | Performed by: NURSE ANESTHETIST, CERTIFIED REGISTERED

## 2024-06-05 PROCEDURE — 82150 ASSAY OF AMYLASE: CPT | Performed by: INTERNAL MEDICINE

## 2024-06-05 PROCEDURE — 258N000003 HC RX IP 258 OP 636: Performed by: NURSE ANESTHETIST, CERTIFIED REGISTERED

## 2024-06-05 PROCEDURE — 88313 SPECIAL STAINS GROUP 2: CPT | Mod: 26 | Performed by: PATHOLOGY

## 2024-06-05 PROCEDURE — 272N000001 HC OR GENERAL SUPPLY STERILE: Performed by: INTERNAL MEDICINE

## 2024-06-05 PROCEDURE — 43238 EGD US FINE NEEDLE BX/ASPIR: CPT | Performed by: ANESTHESIOLOGY

## 2024-06-05 PROCEDURE — 370N000017 HC ANESTHESIA TECHNICAL FEE, PER MIN: Performed by: INTERNAL MEDICINE

## 2024-06-05 RX ORDER — NALOXONE HYDROCHLORIDE 0.4 MG/ML
0.2 INJECTION, SOLUTION INTRAMUSCULAR; INTRAVENOUS; SUBCUTANEOUS
Status: DISCONTINUED | OUTPATIENT
Start: 2024-06-05 | End: 2024-06-05 | Stop reason: HOSPADM

## 2024-06-05 RX ORDER — ONDANSETRON 2 MG/ML
4 INJECTION INTRAMUSCULAR; INTRAVENOUS EVERY 6 HOURS PRN
Status: DISCONTINUED | OUTPATIENT
Start: 2024-06-05 | End: 2024-06-05 | Stop reason: HOSPADM

## 2024-06-05 RX ORDER — LIDOCAINE HYDROCHLORIDE 20 MG/ML
INJECTION, SOLUTION INFILTRATION; PERINEURAL PRN
Status: DISCONTINUED | OUTPATIENT
Start: 2024-06-05 | End: 2024-06-05

## 2024-06-05 RX ORDER — ONDANSETRON 4 MG/1
4 TABLET, ORALLY DISINTEGRATING ORAL EVERY 30 MIN PRN
Status: DISCONTINUED | OUTPATIENT
Start: 2024-06-05 | End: 2024-06-05 | Stop reason: HOSPADM

## 2024-06-05 RX ORDER — NALOXONE HYDROCHLORIDE 0.4 MG/ML
0.4 INJECTION, SOLUTION INTRAMUSCULAR; INTRAVENOUS; SUBCUTANEOUS
Status: DISCONTINUED | OUTPATIENT
Start: 2024-06-05 | End: 2024-06-05 | Stop reason: HOSPADM

## 2024-06-05 RX ORDER — PROPOFOL 10 MG/ML
INJECTION, EMULSION INTRAVENOUS PRN
Status: DISCONTINUED | OUTPATIENT
Start: 2024-06-05 | End: 2024-06-05

## 2024-06-05 RX ORDER — ONDANSETRON 4 MG/1
4 TABLET, ORALLY DISINTEGRATING ORAL EVERY 6 HOURS PRN
Status: DISCONTINUED | OUTPATIENT
Start: 2024-06-05 | End: 2024-06-05 | Stop reason: HOSPADM

## 2024-06-05 RX ORDER — ONDANSETRON 2 MG/ML
4 INJECTION INTRAMUSCULAR; INTRAVENOUS EVERY 30 MIN PRN
Status: DISCONTINUED | OUTPATIENT
Start: 2024-06-05 | End: 2024-06-05 | Stop reason: HOSPADM

## 2024-06-05 RX ORDER — FLUMAZENIL 0.1 MG/ML
0.2 INJECTION, SOLUTION INTRAVENOUS
Status: DISCONTINUED | OUTPATIENT
Start: 2024-06-05 | End: 2024-06-05 | Stop reason: HOSPADM

## 2024-06-05 RX ORDER — SODIUM CHLORIDE, SODIUM LACTATE, POTASSIUM CHLORIDE, CALCIUM CHLORIDE 600; 310; 30; 20 MG/100ML; MG/100ML; MG/100ML; MG/100ML
INJECTION, SOLUTION INTRAVENOUS CONTINUOUS PRN
Status: DISCONTINUED | OUTPATIENT
Start: 2024-06-05 | End: 2024-06-05

## 2024-06-05 RX ORDER — NALOXONE HYDROCHLORIDE 0.4 MG/ML
0.1 INJECTION, SOLUTION INTRAMUSCULAR; INTRAVENOUS; SUBCUTANEOUS
Status: DISCONTINUED | OUTPATIENT
Start: 2024-06-05 | End: 2024-06-05 | Stop reason: HOSPADM

## 2024-06-05 RX ORDER — LIDOCAINE 40 MG/G
CREAM TOPICAL
Status: DISCONTINUED | OUTPATIENT
Start: 2024-06-05 | End: 2024-06-05 | Stop reason: HOSPADM

## 2024-06-05 RX ORDER — ONDANSETRON 2 MG/ML
INJECTION INTRAMUSCULAR; INTRAVENOUS PRN
Status: DISCONTINUED | OUTPATIENT
Start: 2024-06-05 | End: 2024-06-05

## 2024-06-05 RX ORDER — PIPERACILLIN SODIUM, TAZOBACTAM SODIUM 3; .375 G/15ML; G/15ML
INJECTION, POWDER, LYOPHILIZED, FOR SOLUTION INTRAVENOUS PRN
Status: DISCONTINUED | OUTPATIENT
Start: 2024-06-05 | End: 2024-06-05

## 2024-06-05 RX ORDER — PROPOFOL 10 MG/ML
INJECTION, EMULSION INTRAVENOUS CONTINUOUS PRN
Status: DISCONTINUED | OUTPATIENT
Start: 2024-06-05 | End: 2024-06-05

## 2024-06-05 RX ADMIN — PROPOFOL 150 MCG/KG/MIN: 10 INJECTION, EMULSION INTRAVENOUS at 10:50

## 2024-06-05 RX ADMIN — ONDANSETRON 4 MG: 2 INJECTION INTRAMUSCULAR; INTRAVENOUS at 10:55

## 2024-06-05 RX ADMIN — PIPERACILLIN AND TAZOBACTAM 3.38 G: 3; .375 INJECTION, POWDER, FOR SOLUTION INTRAVENOUS at 10:55

## 2024-06-05 RX ADMIN — LIDOCAINE HYDROCHLORIDE 100 MG: 20 INJECTION, SOLUTION INFILTRATION; PERINEURAL at 10:50

## 2024-06-05 RX ADMIN — SODIUM CHLORIDE, POTASSIUM CHLORIDE, SODIUM LACTATE AND CALCIUM CHLORIDE: 600; 310; 30; 20 INJECTION, SOLUTION INTRAVENOUS at 10:30

## 2024-06-05 RX ADMIN — PROPOFOL 30 MG: 10 INJECTION, EMULSION INTRAVENOUS at 10:53

## 2024-06-05 ASSESSMENT — COPD QUESTIONNAIRES: COPD: 1

## 2024-06-05 ASSESSMENT — ACTIVITIES OF DAILY LIVING (ADL)
ADLS_ACUITY_SCORE: 33
ADLS_ACUITY_SCORE: 33
ADLS_ACUITY_SCORE: 34
ADLS_ACUITY_SCORE: 33
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
## 2024-06-05 NOTE — ANESTHESIA POSTPROCEDURE EVALUATION
Patient: Aubrey Duncan    Procedure: Procedure(s):  EUS with FNA       Anesthesia Type:  MAC    Note:  Disposition: Outpatient   Postop Pain Control: Uneventful            Sign Out: Well controlled pain   PONV: No   Neuro/Psych: Uneventful            Sign Out: Acceptable/Baseline neuro status   Airway/Respiratory: Uneventful            Sign Out: Acceptable/Baseline resp. status   CV/Hemodynamics: Uneventful            Sign Out: Acceptable CV status; No obvious hypovolemia; No obvious fluid overload   Other NRE: NONE   DID A NON-ROUTINE EVENT OCCUR? No           Last vitals:  Vitals Value Taken Time   /82 06/05/24 1207   Temp 36.6  C (97.8  F) 06/05/24 1155   Pulse 79 06/05/24 1155   Resp 14 06/05/24 1155   SpO2 98 % 06/05/24 1207   Vitals shown include unfiled device data.    Electronically Signed By: Billy Joe MD  June 5, 2024  12:25 PM

## 2024-06-05 NOTE — DISCHARGE INSTRUCTIONS
Contacting your Doctor -   To contact a doctor, call Dr Cortez's clinic at [phone]  or:  [phone] and ask for the resident on call for Gastroenterology (answered 24 hours a day)   Emergency Department:  [facility]: [phone] 911 if you are in need of immediate or emergent help      After Anesthesia (Sleep Medicine)  What should I do after anesthesia?  You should rest and relax for the next 24 hours. Avoid risky or difficult (strenuous) activity. A responsible adult should stay with you overnight.  Don't drive or use any heavy equipment for 24 hours. Even if you feel normal, your reactions may be affected by the sleep medicine given to you.  Don't drink alcohol or make any important decisions for 24 hours.  Slowly get back to your regular diet, as you feel able.  How should I expect to feel?  It's normal to feel dizzy, light-headed, or faint for up to a full day after anesthesia or while taking pain medicine. If this happens:   Sit down for a few minutes before standing.  Have someone help you when you get up to walk or use the bathroom.  If you have nausea (feel sick to your stomach) or vomit (throw up):   Drink clear liquids (such as apple juice, ginger ale, broth, or 7UP) until you feel better.  If you feel sick to your stomach, or you keep vomiting for 24 hours, please call the doctor.  What else should I know?  You might have a dry mouth, sore throat, muscle aches, or trouble sleeping. These should go away after 24 hours.  Please contact your doctor if you have any other symptoms that concern you, such as fever, pain, bleeding, fluid drainage, swelling, or headache, or if it's been over 8 to 10 hours and you still aren't able to pee (urinate).  If you have a history of sleep apnea, it's very important to use your CPAP machine for the next 24 hours when you nap or sleep.   For informational purposes only. Not to replace the advice of your health care provider. Copyright   2023 [employer]. All rights reserved. Clinically reviewed by Patrick Anguiano MD. [employer] [id number] - REV 09/23.

## 2024-06-05 NOTE — ANESTHESIA CARE TRANSFER NOTE
Patient: Aubrey Duncan    Procedure: Procedure(s):  EUS with FNA       Diagnosis: Pancreatic cyst [K86.2]  Diagnosis Additional Information: No value filed.    Anesthesia Type:   MAC     Note:    Oropharynx: oropharynx clear of all foreign objects and spontaneously breathing  Level of Consciousness: awake  Oxygen Supplementation: room air    Independent Airway: airway patency satisfactory and stable  Dentition: dentition unchanged  Vital Signs Stable: post-procedure vital signs reviewed and stable  Report to RN Given: handoff report given  Patient transferred to: Phase II    Handoff Report: Identifed the Patient, Identified the Reponsible Provider, Reviewed the pertinent medical history, Discussed the surgical course, Reviewed Intra-OP anesthesia mangement and issues during anesthesia, Set expectations for post-procedure period and Allowed opportunity for questions and acknowledgement of understanding      Vitals:  Vitals Value Taken Time   /73 06/05/24 1119   Temp     Pulse     Resp     SpO2 96 % 06/05/24 1120   Vitals shown include unfiled device data.    Electronically Signed By: BRANDI Quarles CRNA  June 5, 2024  11:20 AM

## 2024-06-05 NOTE — LETTER
Patient:  Aubrey Duncan  :   1955  MRN:     [id number]        Mr.David MAURILIO Duncan  [address]  [facility] [address]        Imelda 10, 2024    Dear ,    We are writing to inform you of your test results. In summary, good news. Sampling favors a benign serous type fluid collection rather than a premalignant mucinous collection. Please note that the mucin seen was extracellular and the remainder of the studies are all consistent with a non-mucinous lesion. As discussed previously, our plan includes close surveillance, nonetheless, with an IV contrasted MRI/MRCP in 6m.    I have included the formal documtentation of the results below. It continues to be a pleasure participating in your care.  Please feel free to contact our clinic with any further questions.      Sincerely,    Wu Cortez MD PhD MICHI TODD  Professor of Medicine, Surgery and Pediatrics  Interventional and Therapeutic Endoscopy  Chief, Division of Gastroenterology and Hepatology and Nutrition  GI Service     [facility] [address] [address]    New Consultations  [phone]  Procedure Scheduling [phone]  Clinical Nurse Coordinator [phone]  Clinical Fax   [phone]  Administrative   [phone]  Administrative Fax  [phone]        Resulted Orders   Pancreatic Cyst Panel (Includes Cytology)   Result Value Ref Range    CEA Fluid 88.8 ng/mL    Amylase fluid 7,471.0 U/L    Glucose fluid <2 mg/dL    Narrative    No reference ranges have been established. This result should be interpreted in the context of the patient's clinical condition and compared to simultaneous measurement in the patient's blood. This is a lab developed test. It has not been cleared or approved by the FDA. FDA clearance is not required for clinical use.   Cytology, non-gynecologic   Result Value Ref Range    Final Diagnosis       Specimen A     Interpretation:     No  morphologic evidence of malignancy     Other Findings:       Mucicarmine stain positive for extracellular mucin.      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 Pancreas, Head, :A. Pancreas, Head, , Pancreatic Cyst Fluid:  Received 1 ml of hazy, red fluid, processed 1 Pap stained cytospin and 1 fixed cytospin for mucicarmine.                  Microscopic Description       A microscopic examination was performed.     Case was reviewed by the following:  Pathology Fellow: Serafin Jefferson MD  A resident or fellow in a training program was involved in the initial review, preparation, and/or interpretation of this case.  I, as the senior physician, attest that I have personally reviewed all specimens and or slides, including the listed special stains, and used them with my medical judgement to determine the final diagnosis.              Abnormal Result? Yes (A) No    Performing Labs       The technical component of this testing was completed at [facility].     Stain controls for all stains resulted within this report have been reviewed and show appropriate reactivity.

## 2024-06-05 NOTE — OP NOTE
Upper EUS 06/05/2024  8:25 AM [address] [place] [address] [phone]     Endoscopy Department  _______________________________________________________________________________  Patient Name: Aubrey Duncan             Procedure Date: 6/5/2024 8:25 AM  MRN: [id number]                       Account Number: [id number]  YOB: 1955              Admit Type: Outpatient  Age: 68                               Room: [place] [address]  Gender: Male                          Note Status: Finalized  Attending MD: JESSICA HENNING MD,   Total Sedation Time:  _______________________________________________________________________________     Procedure:             Upper EUS  Indications:           Pancreatic cyst on CT scan  Providers:             JESSICA HENNING MD, ALEXA RAPHAEL MD,                         Dutch Latham, RN  Patient Profile:       Mr Duncan is a 67yo gentleman status post lung                         transplant complicated by B cell lymphoma who was                         incidentally found to have 3.5cm cystic lesion of the                         pancreatic head. EUS in July demonstrated the lesion                         with sampling consistent with a benign serous etiology                         with low CEA and high amylase (suggestive of a                         pseudocyst). With continued growth he now returns for                         repeat evaluation and sampling by EUS.  Referring MD:          MEG MELLO  Medicines:             Deep sedation was administered, Piperacil 1 g IV  Complications:         No immediate complications.  _______________________________________________________________________________  Procedure:             Pre-Anesthesia Assessment:                         - Prior to the procedure, a History and Physical was                         performed, and patient medications and  allergies were                         reviewed. The patient is competent. The risks and                         benefits of the procedure and the sedation options and                         risks were discussed with the patient. All questions                         were answered and informed consent was obtained.                         Patient identification and proposed procedure were                         verified by the nurse in the pre-procedure area.                         Mental Status Examination: alert and oriented. Airway                         Examination: Mallampati Class II (the uvula but not                         tonsillar pillars visualized). Respiratory                         Examination: clear to auscultation. CV Examination:                         normal. ASA Grade Assessment: III - A patient with                         severe systemic disease. After reviewing the risks and                         benefits, the patient was deemed in satisfactory                         condition to undergo the procedure. The anesthesia                         plan was to use deep sedation / analgesia. Immediately                         prior to administration of medications, the patient                         was re-assessed for adequacy to receive sedatives. The                         heart rate, respiratory rate, oxygen saturations,                         blood pressure, adequacy of pulmonary ventilation, and                         response to care were monitored throughout the                         procedure. The physical status of the patient was                         re-assessed after the procedure.                         After obtaining informed consent, the endoscope was                         passed under direct vision. Throughout the procedure,                         the patient's blood pressure, pulse, and oxygen                         saturations were monitored continuously. The  EUS                         Linear was introduced through the mouth, and advanced                         to the second part of duodenum. The upper EUS was                         accomplished without difficulty. The patient tolerated                         the procedure well.                                                                                   Findings:       White light and endosonographic findings : :       The patient was left lateral under deep sedation and a linear       echoendoscope was used throughout. Limited white light imaging of the       foregut was notable for semisolid residual food in the stomach though       the lumen itself was unremarkable throughout. In terms of       endosonography, an anechoic lesion was identified in the pancreatic head       measuring 50.1x35.8 mm in maximal cross-sectional diameter. The main       duct did not communicate with the lesion which had a thin septation and       two compartments. An outer wall of the lesion was not seen and there was       no solid internal component or associated mass. There was no internal       debris within the fluid-filled cavity. The remainder of the pancreatic       parenchyma was diffusely mildly hetergeneous without lobularity or       synchronous solid or liquid lesion. The main duct was decompressed       throghout measuring just 2.3 mm in the head with smooth taper to less       than 1mm in the body and tail. The gallbladder is in situ and without       wall thickening or internal solid component. The common duct was traced       from the bifurcation to the ampulla and measured up to 5mm with smooth       taper and was without wall thickening or internal solid component. Views       of the left and right lobes were without concerning lesion. There was no       evidence of peripancreatic, perigastric, periportal or celiac region       lymphadenopathy. The major vascularity of the abdomen including the       portal,  splenics, superior mesenterics, gastroduodenal, and celiac were       traditional. Diagnostic needle aspiration for fluid was performed of the       head cyst. Color Doppler imaging was utilized prior to needle puncture       to confirm a lack of significant vascular structures within the needle       path. One pass was made with the 22 gauge needle using a transduodenal       approach. No stylet was used. The amount of fluid collected was 2 mL.The       fluid was clear and thin. Sample(s) were sent for amylase concentration,       mucin, glucose, cytology and CEA.                                                                                   Impression:            - Suggestion of mild gastroparesis with retained                         semisolid food in the stomach with grossly                         unremarkable duodenum                         - Single, thinly septated cystic lesion of the                         pancreatic head nw measuring 5cm with features                         suggestion of a benign serous lesion (thin, no main                         duct communication, no concerning features); sampled                         - Otherwise unremarkable pancreaticobiliary system                         - No evidence of abdominal lymphadenopathy of major                         vascular finding and no lesions of the liver                         appreciated  Recommendation:        - Deep sedation recovery with probable discharge home                         this afternoon                         - Complete a short course of antibiotic as prescribed                         - Avoid antithrombotics for at least 3 days; all other                         medications may resume without delay along with a diet                         and activity                         - Results of sampling will be communicated by Dr Cortez and will determine our next recommendation                          (again suggestive of a serous cyst                         (favored)=surveillance MRI in 6m vs suggestive of a                         mucinous cyst = referral to a surgeon though surgical                         options may be limited in the setting of transplant                         and lymphoma)                         - The findings and recommendations were discussed with                         the patient and their family                                                                                     electronically signed by MARCIAL Cortez

## 2024-06-06 ENCOUNTER — OFFICE VISIT (OUTPATIENT)
Dept: FAMILY MEDICINE | Facility: OTHER | Age: 69
End: 2024-06-06
Attending: NURSE PRACTITIONER
Payer: MEDICARE

## 2024-06-06 VITALS
RESPIRATION RATE: 20 BRPM | OXYGEN SATURATION: 97 % | WEIGHT: 169 LBS | BODY MASS INDEX: 25.7 KG/M2 | SYSTOLIC BLOOD PRESSURE: 116 MMHG | DIASTOLIC BLOOD PRESSURE: 60 MMHG | HEART RATE: 60 BPM | TEMPERATURE: 97.7 F

## 2024-06-06 DIAGNOSIS — I73.9 PAD (PERIPHERAL ARTERY DISEASE) (H): Primary | ICD-10-CM

## 2024-06-06 DIAGNOSIS — E11.51 DIABETES MELLITUS WITH PERIPHERAL VASCULAR DISEASE (H): ICD-10-CM

## 2024-06-06 DIAGNOSIS — M79.671 BILATERAL FOOT PAIN: ICD-10-CM

## 2024-06-06 DIAGNOSIS — M79.672 BILATERAL FOOT PAIN: ICD-10-CM

## 2024-06-06 PROCEDURE — G2211 COMPLEX E/M VISIT ADD ON: HCPCS | Performed by: NURSE PRACTITIONER

## 2024-06-06 PROCEDURE — G0463 HOSPITAL OUTPT CLINIC VISIT: HCPCS

## 2024-06-06 PROCEDURE — 99214 OFFICE O/P EST MOD 30 MIN: CPT | Performed by: NURSE PRACTITIONER

## 2024-06-06 RX ORDER — GABAPENTIN 100 MG/1
200 CAPSULE ORAL 3 TIMES DAILY
Qty: 180 CAPSULE | Refills: 0 | Status: CANCELLED | OUTPATIENT
Start: 2024-06-06

## 2024-06-06 RX ORDER — GABAPENTIN 300 MG/1
300 CAPSULE ORAL 3 TIMES DAILY
Qty: 270 CAPSULE | Refills: 4 | Status: SHIPPED | OUTPATIENT
Start: 2024-06-06 | End: 2024-06-17

## 2024-06-06 RX ORDER — GABAPENTIN 100 MG/1
100 CAPSULE ORAL DAILY PRN
Qty: 90 CAPSULE | Refills: 0 | Status: SHIPPED | OUTPATIENT
Start: 2024-06-06 | End: 2024-06-21

## 2024-06-06 ASSESSMENT — PAIN SCALES - GENERAL: PAINLEVEL: MODERATE PAIN (4)

## 2024-06-06 NOTE — NURSING NOTE
Patient presents today for follow up on multiple concerns.    Medication Reconciliation Complete    Yohana Barreto LPN  6/6/2024 1:33 PM

## 2024-06-06 NOTE — PROGRESS NOTES
Assessment & Plan   Problem List Items Addressed This Visit          Circulatory    PAD (peripheral artery disease) (H24) - Primary    Relevant Medications    gabapentin (NEURONTIN) 300 MG capsule    gabapentin (NEURONTIN) 100 MG capsule     Other Visit Diagnoses       Diabetes mellitus with peripheral vascular disease (H)        Relevant Medications    gabapentin (NEURONTIN) 300 MG capsule    gabapentin (NEURONTIN) 100 MG capsule    Bilateral foot pain        Relevant Medications    gabapentin (NEURONTIN) 300 MG capsule    gabapentin (NEURONTIN) 100 MG capsule           Increase gabapentin to 300 mg 3 times daily, may take an additional 100 mg daily as needed bilateral foot pain related to neuropathy and peripheral artery disease.  He will let me know if this is not controlling pain, may adjust further if needed.    The longitudinal plan of care for the diagnosis(es)/condition(s) as documented were addressed during this visit. Due to the added complexity in care, I will continue to support Aubrey in the subsequent management and with ongoing continuity of care.    No follow-ups on file.      Subjective   Aubrey is a 68 year old, presenting for the following health issues:  RECHECK    History of Present Illness       Diabetes:   He presents for follow up of diabetes.  He is checking home blood glucose three times daily.   He checks blood glucose before meals.  Blood glucose is never over 200 and never under 70. He is aware of hypoglycemia symptoms including dizziness.   He is concerned about frequent infections.   He is having burning in feet and redness, sores, or blisters on feet.            Hypertension: He presents for follow up of hypertension.  He does check blood pressure  regularly outside of the clinic. Outside blood pressures have been over 140/90. He does not follow a low salt diet.     He eats 2-3 servings of fruits and vegetables daily.He consumes 0 sweetened beverage(s) daily.He exercises with enough  effort to increase his heart rate 10 to 19 minutes per day.  He exercises with enough effort to increase his heart rate 4 days per week.   He is taking medications regularly.     He presents to clinic today for medication management.  He has been using gabapentin for neuropathy symptoms, increased pain related to peripheral artery disease.  He is working with podiatry for open wounds as well as circulatory specialist regarding peripheral artery disease.  Gabapentin has been helping keep this under control but continues to have daily burning and pain.        Objective    /60   Pulse 60   Temp 97.7  F (36.5  C)   Resp 20   Wt 76.7 kg (169 lb)   SpO2 97%   BMI 25.70 kg/m    Body mass index is 25.7 kg/m .  Physical Exam   GENERAL: alert and no distress  EYES: Eyes grossly normal to inspection  RESP: lungs clear to auscultation - no rales, rhonchi or wheezes  CV: regular rate and rhythm, normal S1 S2  NEURO: Normal strength and tone, mentation intact and speech normal  PSYCH: mentation appears normal, affect normal/bright            Signed Electronically by: BRANDI Manning CNP

## 2024-06-07 ENCOUNTER — DOCUMENTATION ONLY (OUTPATIENT)
Dept: VASCULAR SURGERY | Facility: CLINIC | Age: 69
End: 2024-06-07
Payer: MEDICARE

## 2024-06-07 NOTE — PROGRESS NOTES
Procedure: Bilateral  Leg  Angiogram     Procedure Date :  7/9/24    Procedure Time :  8:00 AM    Arrival Time: 7:00 AM    Admission Type: Outpatient    Surgeon:     Procedure Location: [facility]:  [address] [place] [address] (Phone: [phone], Fax: [phone])    Consent Completed:No, Scanned: No    Scheduled with: Yeimy in IR scheduling    Anesthesia Needed: no IF Yes Please clarify that the CRNA, anesthesia and LEDYI/PACU resources are being added at scheduling    Blood Thinners Address: pt on plavix    2 week Post Procedure Appointment with   and also ultrasound:   7/23/2024 10:30 AM (Arrive by 10:15 AM) Ultrasound [facility]   7/23/2024 2:00 PM (Arrive by 1:45 PM) Ultrasound [facility]   7/23/2024 3:15 PM (Arrive by 3:00 PM) Grace Sneed MD [facility] [place]

## 2024-06-11 DIAGNOSIS — K86.2 PANCREATIC CYST: Primary | ICD-10-CM

## 2024-06-17 ENCOUNTER — MYC MEDICAL ADVICE (OUTPATIENT)
Dept: FAMILY MEDICINE | Facility: OTHER | Age: 69
End: 2024-06-17
Payer: MEDICARE

## 2024-06-17 DIAGNOSIS — M79.671 BILATERAL FOOT PAIN: ICD-10-CM

## 2024-06-17 DIAGNOSIS — I73.9 PAD (PERIPHERAL ARTERY DISEASE) (H): ICD-10-CM

## 2024-06-17 DIAGNOSIS — E11.51 DIABETES MELLITUS WITH PERIPHERAL VASCULAR DISEASE (H): ICD-10-CM

## 2024-06-17 DIAGNOSIS — M79.672 BILATERAL FOOT PAIN: ICD-10-CM

## 2024-06-17 RX ORDER — GABAPENTIN 100 MG/1
300 CAPSULE ORAL 3 TIMES DAILY
Qty: 270 CAPSULE | Refills: 11 | Status: SHIPPED | OUTPATIENT
Start: 2024-06-17 | End: 2024-06-21

## 2024-06-17 NOTE — TELEPHONE ENCOUNTER
I am fine with the way he is taking them. We can always re-address as he gets used to the change in dose (may need to adjust slower in the future if needed). BRANDI Manning CNP on 6/17/2024 at 1:53 PM

## 2024-06-17 NOTE — TELEPHONE ENCOUNTER
Note made in medications in chart as to how patient is taking.     Hoa,    My thoughts:  Glad you've found a schedule that seems to work for you for the Gabapentin.  Let us know if you need updated orders sent in to your pharmacy for either the 100mg dose or the 300mg dose.      Svitlana Potts RN on 6/17/2024 at 1:06 PM

## 2024-06-19 ENCOUNTER — MYC MEDICAL ADVICE (OUTPATIENT)
Dept: FAMILY MEDICINE | Facility: OTHER | Age: 69
End: 2024-06-19
Payer: MEDICARE

## 2024-06-19 NOTE — TELEPHONE ENCOUNTER
I think I will leave this one for Hoa, as I am not entirely sure what she which direction she would like to go with it as far as an alternative treatment.       Svetlana Monte NP on 6/19/2024 at 3:59 PM

## 2024-06-19 NOTE — TELEPHONE ENCOUNTER
Per OV from 6/6/24:       Increase gabapentin to 300 mg 3 times daily, may take an additional 100 mg daily as needed bilateral foot pain related to neuropathy and peripheral artery disease.  He will let me know if this is not controlling pain, may adjust further if needed.    He presents to clinic today for medication management. He has been using gabapentin for neuropathy symptoms, increased pain related to peripheral artery disease. He is working with podiatry for open wounds as well as circulatory specialist regarding peripheral artery disease. Gabapentin has been helping keep this under control but continues to have daily burning and pain.     Raghav Daley RN on 6/19/2024 at 2:02 PM

## 2024-06-20 ENCOUNTER — MYC MEDICAL ADVICE (OUTPATIENT)
Dept: FAMILY MEDICINE | Facility: OTHER | Age: 69
End: 2024-06-20
Payer: MEDICARE

## 2024-06-21 ENCOUNTER — MYC MEDICAL ADVICE (OUTPATIENT)
Dept: CARDIOLOGY | Facility: CLINIC | Age: 69
End: 2024-06-21
Payer: MEDICARE

## 2024-06-21 ENCOUNTER — OFFICE VISIT (OUTPATIENT)
Dept: FAMILY MEDICINE | Facility: OTHER | Age: 69
End: 2024-06-21
Attending: NURSE PRACTITIONER
Payer: MEDICARE

## 2024-06-21 VITALS
WEIGHT: 171.6 LBS | HEART RATE: 92 BPM | BODY MASS INDEX: 26.09 KG/M2 | OXYGEN SATURATION: 94 % | SYSTOLIC BLOOD PRESSURE: 108 MMHG | DIASTOLIC BLOOD PRESSURE: 62 MMHG | TEMPERATURE: 98.5 F | RESPIRATION RATE: 17 BRPM

## 2024-06-21 DIAGNOSIS — I73.9 PAD (PERIPHERAL ARTERY DISEASE) (H): ICD-10-CM

## 2024-06-21 DIAGNOSIS — K21.9 GASTROESOPHAGEAL REFLUX DISEASE, UNSPECIFIED WHETHER ESOPHAGITIS PRESENT: ICD-10-CM

## 2024-06-21 DIAGNOSIS — G62.9 PERIPHERAL POLYNEUROPATHY: Primary | ICD-10-CM

## 2024-06-21 PROCEDURE — 99214 OFFICE O/P EST MOD 30 MIN: CPT | Performed by: NURSE PRACTITIONER

## 2024-06-21 PROCEDURE — G2211 COMPLEX E/M VISIT ADD ON: HCPCS | Performed by: NURSE PRACTITIONER

## 2024-06-21 PROCEDURE — G0463 HOSPITAL OUTPT CLINIC VISIT: HCPCS

## 2024-06-21 RX ORDER — DULOXETIN HYDROCHLORIDE 20 MG/1
20 CAPSULE, DELAYED RELEASE ORAL DAILY
Qty: 30 CAPSULE | Refills: 3 | Status: SHIPPED | OUTPATIENT
Start: 2024-06-21 | End: 2024-08-25

## 2024-06-21 NOTE — NURSING NOTE
"Chief Complaint   Patient presents with    Medication Problem     Patient had extreme nausea and vomited after taking gabapentin.  Initial /62   Pulse 92   Temp 98.5  F (36.9  C) (Tympanic)   Resp 17   Wt 77.8 kg (171 lb 9.6 oz)   SpO2 94%   BMI 26.09 kg/m   Estimated body mass index is 26.09 kg/m  as calculated from the following:    Height as of 6/5/24: 1.727 m (5' 8\").    Weight as of this encounter: 77.8 kg (171 lb 9.6 oz).  Medication Review: complete    The next two questions are to help us understand your food security.  If you are feeling you need any assistance in this area, we have resources available to support you today.          3/18/2024   SDOH- Food Insecurity   Within the past 12 months, did you worry that your food would run out before you got money to buy more? Pt Declined   Within the past 12 months, did the food you bought just not last and you didn t have money to get more? Pt Declined            Health Care Directive:  Patient has a Health Care Directive on file      Malika Stubbs MA      "

## 2024-06-21 NOTE — TELEPHONE ENCOUNTER
GI symptoms are a small risk with these medications-typically see more neruo symptoms (dizziness). We could try cymbalta (daily depression medication which work on neuro pathways to treat nerve pain/chronic pain). Typically less side effects. Otherwise, I would recommend a short course of carafate to try and get the GI symptoms calmed down. BRANDI Manning CNP on 6/21/2024 at 7:22 AM

## 2024-06-21 NOTE — PROGRESS NOTES
Assessment & Plan   Problem List Items Addressed This Visit          Digestive    Gastroesophageal reflux disease, esophagitis presence not specified       Circulatory    PAD (peripheral artery disease) (H24)     Other Visit Diagnoses       Peripheral polyneuropathy    -  Primary    Relevant Medications    DULoxetine (CYMBALTA) 20 MG capsule         Will have him stay off gabapentin due to possible side effects. Recommend giving the weekend to allow medication to metabolize then start cymbalta 20 mg daily. He will update GI provider about sx as well. Having some chest tightness with exercise, recommend notifying cardiology.     No follow-ups on file.      Albert Burgos is a 68 year old, presenting for the following health issues:  Medication Problem        6/21/2024     2:33 PM   Additional Questions   Roomed by Malika FISH CMA     History of Present Illness       Reason for visit:  Meds    He eats 2-3 servings of fruits and vegetables daily.He consumes 0 sweetened beverage(s) daily.He exercises with enough effort to increase his heart rate 10 to 19 minutes per day.  He exercises with enough effort to increase his heart rate 4 days per week.   He is taking medications regularly.     He comes into clinic today with wife for follow up on potential s/e of gabapentin. Using for PN symptoms. Recently increased dose, feels worsening reflux, dizziness, fatigue. Stopped taking gabapentin, using tylenol for foot pain. Foot symptoms slightly worse with stopping gabapentin. Reflux sx slightly better, reflux remains.           Objective    /62   Pulse 92   Temp 98.5  F (36.9  C) (Tympanic)   Resp 17   Wt 77.8 kg (171 lb 9.6 oz)   SpO2 94%   BMI 26.09 kg/m    Body mass index is 26.09 kg/m .  Physical Exam   GENERAL: alert and no distress  EYES: Eyes grossly normal to inspection  NEURO: Normal strength and tone, mentation intact and speech normal  PSYCH: mentation appears normal, affect normal/bright             Signed Electronically by: BRANDI Manning CNP

## 2024-06-24 ENCOUNTER — MYC MEDICAL ADVICE (OUTPATIENT)
Dept: FAMILY MEDICINE | Facility: OTHER | Age: 69
End: 2024-06-24

## 2024-06-24 ENCOUNTER — OFFICE VISIT (OUTPATIENT)
Dept: DERMATOLOGY | Facility: CLINIC | Age: 69
End: 2024-06-24
Payer: MEDICARE

## 2024-06-24 ENCOUNTER — TELEPHONE (OUTPATIENT)
Dept: CARDIOLOGY | Facility: CLINIC | Age: 69
End: 2024-06-24

## 2024-06-24 ENCOUNTER — TELEPHONE (OUTPATIENT)
Dept: DERMATOLOGY | Facility: CLINIC | Age: 69
End: 2024-06-24

## 2024-06-24 VITALS — SYSTOLIC BLOOD PRESSURE: 134 MMHG | DIASTOLIC BLOOD PRESSURE: 85 MMHG | HEART RATE: 102 BPM

## 2024-06-24 DIAGNOSIS — C44.319 BASAL CELL CARCINOMA (BCC) OF RIGHT CHEEK: ICD-10-CM

## 2024-06-24 DIAGNOSIS — D04.39 SQUAMOUS CELL CARCINOMA IN SITU (SCCIS) OF SKIN OF LEFT TEMPLE REGION: Primary | ICD-10-CM

## 2024-06-24 DIAGNOSIS — D48.5 NEOPLASM OF UNCERTAIN BEHAVIOR OF SKIN: ICD-10-CM

## 2024-06-24 PROCEDURE — 88305 TISSUE EXAM BY PATHOLOGIST: CPT | Mod: 26 | Performed by: PATHOLOGY

## 2024-06-24 PROCEDURE — 88305 TISSUE EXAM BY PATHOLOGIST: CPT | Mod: TC | Performed by: DERMATOLOGY

## 2024-06-24 PROCEDURE — 17311 MOHS 1 STAGE H/N/HF/G: CPT | Performed by: DERMATOLOGY

## 2024-06-24 PROCEDURE — 13132 CMPLX RPR F/C/C/M/N/AX/G/H/F: CPT | Mod: XS | Performed by: DERMATOLOGY

## 2024-06-24 PROCEDURE — 11102 TANGNTL BX SKIN SINGLE LES: CPT | Mod: XS | Performed by: DERMATOLOGY

## 2024-06-24 PROCEDURE — 14040 TIS TRNFR F/C/C/M/N/A/G/H/F: CPT | Performed by: DERMATOLOGY

## 2024-06-24 PROCEDURE — 11103 TANGNTL BX SKIN EA SEP/ADDL: CPT | Performed by: DERMATOLOGY

## 2024-06-24 RX ORDER — PANTOPRAZOLE SODIUM 40 MG/1
40 TABLET, DELAYED RELEASE ORAL DAILY
Qty: 90 TABLET | Refills: 1 | Status: SHIPPED | OUTPATIENT
Start: 2024-06-24

## 2024-06-24 NOTE — CONFIDENTIAL NOTE
"S-(situation): patient messaged, \"I have been lifting weights and working with my arms I get an ache type feeling in my chest. Also having heart burn. Wake up early morning with it also. Belching up.  My doctor here in [place] wanted me 2 write and tell u.\"  He states that he has been waking up around 2 am with heart burn. Has stated that he thinks it may be the gabapentin they just started him on  for his leg pain.  He upped his Prilosec to 2 pills twice daily and has also started taking tums. He has not tried nitroglycerin yet.  He denies shortness of breath and palpitations.     B-(background): Mr. 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 He is seen today via video visit for follow up.     He presented to [facility] on 1/[facility], reported with left-sided/substernal chest pain with exertion that had been occurring for 1 week, decreased at rest. Troponin 22->23, EKG without ischemic changes. He underwent coronary angiogram 1/11/23 showed severe multivessel disease with 80% stenosis in prox-mid LAD and heavily calcified stenosis in mid-RCA. He underwent PCI x1 to LAD, with plan to return for staged PCI.      Today he denies chest pain, palpitations, dizziness, syncope, or lower extremity edema. He notes moderate bruising from DAPT.    A-(assessment): chest ache and heart burn     R-(recommendations): Encouraged patient to use his nitroglycerin next time he has these symptoms instead of tums or prilosec. Asked CACs to look for first available appointment patient can come to.    "

## 2024-06-24 NOTE — PROGRESS NOTES
81 year old male with PMH of AFIB, HTN and chronic urinary retention (chronic plummer); who presented to [facility] via ambulance on 2/17 with progressively worsening SOB, intubated in the field, found to have a large R pleural effusion with mediastinal and hilar lymphadenopathy s/p thoracentesis (~3L), with persistent leukocytosis and concern for suspicious malignancy was transferred to [facility] on 2/24 for further workup.  He underwent a R supraclavicular mass biopsy on 2/28, significant for classic Hodgkin's lymphoma. On 3/3, he underwent a R thoracotomy/VATS with Pleurex placement. Bone marrow negative for involvement,  (s/p L chest wall mediport placement 3/15). Rehab course c/b worsening pleural effusion requiring acute transfer for drainage.      Patient seen and examined at bedside,   No dyspnea, comfortable at rest.  Plummer catheter in place. noted bleeding in plummer bag  Denies headache, dizziness, visual changes, chest pain, abdominal pain, nausea, vomiting,   Discussed Urology recs to remove Plummer prior to DC and resume straight caths at home.   No other complaints at this time.    Vital Signs Last 24 Hrs  T(C): 36.3 (20 Apr 2023 08:39), Max: 36.7 (19 Apr 2023 20:58)  T(F): 97.4 (20 Apr 2023 08:39), Max: 98.1 (19 Apr 2023 20:58)  HR: 55 (20 Apr 2023 08:55) (55 - 79)  BP: 114/66 (20 Apr 2023 08:39) (114/66 - 127/84)  BP(mean): --  RR: 16 (20 Apr 2023 08:39) (16 - 16)  SpO2: 100% (20 Apr 2023 08:55) (93% - 100%)    Parameters below as of 20 Apr 2023 08:55  Patient On (Oxygen Delivery Method): nasal cannula      GENERAL- NAD  EAR/NOSE/MOUTH/THROAT - leisions,  MMM  EYES- HANS, conjunctiva and Sclera clear  NECK- supple  RESPIRATORY-  clear to auscultation bilaterally, non laboured breathing, right pleurex+  CARDIOVASCULAR - SIS2, RRR  GI - soft NT BS present  EXTREMITIES- no pedal edema  NEUROLOGY- no gross focal deficits  PSYCHIATRY- AAO X 3                8.5                  137  | 34   | 12           7.80  >-----------< 405     ------------------------< 116                   28.1                 4.6  | 100  | 0.56                                         Ca 8.8   Mg x     Ph x          MEDICATIONS  (STANDING):  albuterol    0.083% 2.5 milliGRAM(s) Nebulizer every 6 hours  ammonium lactate 12% Lotion 1 Application(s) Topical two times a day  atenolol  Tablet 12.5 milliGRAM(s) Oral daily  budesonide 160 MICROgram(s)/formoterol 4.5 MICROgram(s) Inhaler 2 Puff(s) Inhalation two times a day  chlorhexidine 2% Cloths 1 Application(s) Topical daily  escitalopram 10 milliGRAM(s) Oral daily  famotidine    Tablet 20 milliGRAM(s) Oral daily  magnesium oxide 400 milliGRAM(s) Oral three times a day with meals  polyethylene glycol 3350 17 Gram(s) Oral daily  senna 2 Tablet(s) Oral at bedtime  simvastatin 40 milliGRAM(s) Oral at bedtime  tiotropium 2.5 MICROgram(s) Inhaler 2 Puff(s) Inhalation daily  traZODone 50 milliGRAM(s) Oral at bedtime    MEDICATIONS  (PRN):  acetaminophen     Tablet .. 650 milliGRAM(s) Oral every 6 hours PRN Temp greater or equal to 38C (100.4F), Mild Pain (1 - 3)   [facility] [place] Procedure Note      Date of Service:  Jun 24, 2024  Surgery: Mohs micrographic surgery    Case 1  Repair Type: rhombic  Repair Size: 3x2 cm  Suture Material: 4.0 mono, 5.0 FAG  Tumor Type: SCCIS/BCC  Location: L temple  Derm-Path Accession #: [id number]   PreOp Size: 0.8x0.7 cm  PostOp Size: 1.7x1.5 cm  Mohs Accession #:   Level of Defect: fascia      Procedure:  We discussed the principles of treatment and most likely complications including scarring, bleeding, infection, swelling, pain, crusting, nerve damage, large wound,  incomplete excision, wound dehiscence,  nerve damage, recurrence, and a second procedure may be recommended to obtain the best cosmetic or functional result.    Informed consent was obtained and the patient underwent the procedure as follows:  The patient was placed supine on the operating table.  The cancer was identified, outlined with a marker, and verified by the patient.  The entire surgical field was prepped with chlorhexadine.  The surgical site was anesthetized using lidocaine with epi.      The area of clinically apparent tumor was debulked. The layer of tissue was then surgically excised using a #15 blade and was then transferred onto a specimen sheet maintaining the orientation of the specimen. Hemostasis was obtained using electrofulguration. The wound site was then covered with a dressing while the tissue samples were processed for examination.    The excised tissue was transported to the Mohs histology laboratory maintaining the tissue orientation.  The tissue specimen was relaxed so that the entire surgical margin was in a a single horizontal plane for sectioning and inked for precise mapping.  A precise reference map was drawn to reflect the sectioning of the specimen, colored inking of the margins, and orientation on the patient. The tissue was processed using horizontal sectioning of the base and continuous  peripheral margins.  The histopathologic sections were reviewed in conjunction with the reference map.    Total blocks: 1    Total slides:  3    There were no cancer cells visualized on examination, therefore Mohs surgery was complete.    PROCEDURE: Rhombic Transposition Flap  The patient was taken to the operative suite and placed supine on the operating room table.  The wound was identified and infiltrated with 1% ldo with epi.  The defect was then cleansed and prepped with chlorhexadine and draped with sterile drapes.  Using a marker, a rhomboid transposition flap repair was planned.  The wound edges were then debeveled and the wound was undermined bluntly in all directions.  The transposition flap was incised sharply to the level of fat.  The flap was undermined from all surrounding tissue. Hemostasis was obtained with electrocoagulation.  The flap was transposed into the primary defect.  The secondary defect and flap closed with deep dermal vicryl sutures Epidermal tissue was carefully approximated using 5.0 FAG epidermal sutures throughout the length of the flap.  Redundant skin was excised by the triangulation technique, and closed in similar fashion.  The wound was cleansed with sterile saline and polysporin was applied. A sterile non-adherent pressure dressing was placed.  The patient left the operating suite in stable condition.  The patient will return n/a for suture removal. Wound care was reviewed verbally and in writing. The patient was counseled that revisionary procedures may need to be used as a second stage of this reconstruction.     Dr. velazquez was present for the entire procedure and always immediately available.    [facility] [place] Procedure Note      Date of Service:  Jun 24, 2024  Surgery: Mohs micrographic surgery    Case 2  Repair Type: complex  Repair Size: 4 cm  Suture Material: 4.0 mono, 5.0 FAG  Tumor Type: BCC  Location: R facial cheek  Derm-Path  Accession #: [id number]   PreOp Size: 0.7x0.6 cm  PostOp Size: 2.3x2.2 cm  Mohs Accession #:   Level of Defect: fat      Procedure:  We discussed the principles of treatment and most likely complications including scarring, bleeding, infection, swelling, pain, crusting, nerve damage, large wound,  incomplete excision, wound dehiscence,  nerve damage, recurrence, and a second procedure may be recommended to obtain the best cosmetic or functional result.    Informed consent was obtained and the patient underwent the procedure as follows:  The patient was placed supine on the operating table.  The cancer was identified, outlined with a marker, and verified by the patient.  The entire surgical field was prepped with chlorhexadine.  The surgical site was anesthetized using 1% lidocaine with epi.      The area of clinically apparent tumor was debulked. The layer of tissue was then surgically excised using a #15 blade and was then transferred onto a specimen sheet maintaining the orientation of the specimen. Hemostasis was obtained using electrofulturation. The wound site was then covered with a dressing while the tissue samples were processed for examination.    The excised tissue was transported to the Mohs histology laboratory maintaining the tissue orientation.  The tissue specimen was relaxed so that the entire surgical margin was in a a single horizontal plane for sectioning and inked for precise mapping.  A precise reference map was drawn to reflect the sectioning of the specimen, colored inking of the margins, and orientation on the patient.  The tissue was processed using horizontal sectioning of the base and continuous peripheral margins.  The histopathologic sections were reviewed in conjunction with the reference map.    Total blocks: 1    Total slides:  2    There were no cancer cells visualized on examination, therefore Mohs surgery was complete.     Reconstruction: Complex Linear Closure    Due to one  or more of the following factors, complex linear closure was required/indicated: Extensive undermining (equal to or greater than the maximum perpendicular width of the defect), exposure of underlying structure (bone, cartilage, tendon, named neurovascular), free margin involvement (helical rim, vermillion border, nostril rim), and/or placement of retention sutures.     The patient was taken to the operative suite and placed supine on the operating room table.  The defect was identified.  Appropriate markings were made with a marking pen to plan the repair.  The area was infiltrated with Lidocaine 1% with epi 1:100,000 and prepped with Hibiclens and draped with sterile towels.     The wound was debeveled and undermined widely.  Cones were excised within relaxed skin tension lines on both sides of the defect.  Hemostasis was obtained using electrocoagulation. A fascial plication suture using 4.0 monocryl suture material was placed to narrow the primary defect. Subcutaneous tissues were then approximated using  buried vertical mattress sutures.  The wound edges were then approximated additional  buried sutures were placed in a similar fashion where needed.  Percutaneous simple running 5.0 FAG sutures were carefully placed for maximum eversion and meticulous approximation.    Repair Size: 4 cm    The wound was cleansed with saline and ointment was applied along the wound surface.     A sterile pressure dressing was applied.  Wound care instructions were given verbally and in writing.  The patient left the operating suite in stable condition.  Patient was informed that additional refinement of the resulting surgical scar may be used as a second stage of this reconstruction.     The attending surgeon was present for the entire procedure and always immediately available.    Shave biopsy: Location(s) R lateral cheek, R temple (AK vs. BCC vs. SCC).  After discussion of benefits and risks including but not limited to  bleeding/bruising, pain/swelling, infection, scar, incomplete removal, nerve damage/numbness, recurrence, and non-diagnostic biopsy,  verbal consent and photographs were obtained. Time-out was performed. The area was cleaned with isopropyl alcohol. 0.5mL of 1% lidocaine with epinephrine was injected to obtain adequate anesthesia of each lesion. Shave biopsy was performed. Hemostasis was achieved with aluminium chloride. Vaseline and a sterile dressing were applied. The patient tolerated the procedure and no complications were noted. The patient was provided with verbal and written post care instructions.       Attending attestation:  I personally performed the entire procedure.  I have reviewed the note and edited it as necessary, and agree with its contents.    Juwan Wilson M.D.  Professor  Director of Dermatologic Surgery  Department of Dermatology  [facility]    Dermatology Surgery Clinic  [facility] [place] [id number]

## 2024-06-24 NOTE — TELEPHONE ENCOUNTER
6/24/2024 1:23PM Sara Banks  Patient confirmed scheduled appointment:  Date: 6/25/2024  Time: 1PM  Visit type: Return Cardiology  Provider: Yessenia Pryor PA-C  Location: [facility], [address] [place] [id number]  Testing/imaging: NA  Additional notes: 6/24 Scheduled Return Cardiology w/ Yessenia Pryor in  6/25. Pt requested possibly seeing a provider in [place]- let pt know we would need pt to establish care w/ cardiology MD over there. Sent message to Sunitha Salazar to see if that would be ok. If so, I will forward along to [place] cardiology schedulers. DEVON Banks 6/24/2024 1:23PM

## 2024-06-24 NOTE — NURSING NOTE
Chief Complaint   Patient presents with    Derm Problem     SCCIS R willie, MAURILIO LOPEZ, RN-BSN  Dermatology Surgery  [phone]

## 2024-06-24 NOTE — TELEPHONE ENCOUNTER
Follow up call completed following Mohs procedure with Dr. Wilson.       Are you having pain? managed  Are you taking pain medication? Tylenol   Are you applying ice?  no  Have you had any noticeable bleeding through the bandage? no   Do you have any other concerns? no       Please call [phone] if you have any questions or concerns.

## 2024-06-24 NOTE — PATIENT INSTRUCTIONS
Wound care    I will experience scar, bleeding, swelling, pain, crusting and redness. I may experience incomplete removal or recurrence. Risks are bleeding, bruising, swelling, infection, nerve damage, & a large wound. A second procedure may be recommended to obtain the best cosmetic or functional result.       A three month office visit with your Surgeon is recommended for scar evaluation. Please reach out sooner if you have concerns about you surgical site/wound.    Caring for your skin after surgery    After your surgery, a pressure bandage will be placed over the area that has stitches. This is important to prevent bleeding. Please follow these instructions over the next 1 to 2 weeks. Following this regimen will help to prevent complications as your wound heals.     For the first 48 hours after your surgery:    Leave the pressure dressing on and keep it dry. If it should come loose, you may re-tape it, but do not take it off.  Relax and take it easy. Do not do any vigorous exercise or heavy lifting. This could cause the wound to bleed.  Post-Operative pain is usually mild. If you are able to take tylenol, You may take plain or extra-strength Tylenol (acetaminophen) As directed on the bottle (do not take more than 4,000mg in one day). If you are able to take ibuprofen, you can alternate the tylenol and ibuprofen.   Avoid alcohol as this may increase your tendency to bleed.   You may put an ice pack around the bandaged area for 20 minutes at a time as needed. This may help reduce swelling, bruising, and pain. Make sure the ice pack is waterproof so that the pressure bandage doesn t get wet.  If the wound is on the face try to sleep with your head elevated. Either in a recliner or propped up in bed, this will decrease swelling around the eyes.   You may see a small amount of drainage or blood on your pressure bandage. This is normal. However:  If drainage or bleeding continues or saturates the bandage, you will  "need to apply firm pressure over the bandage with a piece of gauze for 15 minutes.  If bleeding continues after applying pressure for 15 minutes, apply an ice pack to the bandaged area for 15 minutes.  If bleeding still continues, call our office or go to the nearest emergency room.    Remove pressure dressing 48 hours after surgery:    Carefully remove the pressure bandage. If it seems sticky or too difficult to get off, you may need to soak it off in the shower.  After the pressure dressing is removed, you may shower and get the wound wet. However, Do Not let the forceful stream of the shower hit the wound directly.  Follow these wound care and dressing change instructions:   In the shower wash the surgical site last with its own separate wash cloth.  You may allow water to run over the site. Take a clean wash cloth wet with soapy warm water and gently pat the suture site to help remove any crust or drainage.   Do Not rub or scrub the site    After site is clean pat dry and apply a thin layer of Vaseline ointment  over the suture site with a cotton swab or clean finger.   Cover the suture site with Telfa (non-stick) dressing. You may tape a piece of gauze over the Telfa for extra protection if you wish.  Continue wound care at least once a day, twice if you are active or around a contaminated environment.  Continue daily wound care until your surgical site is completely healed.   Dissolving stitches, if you have been told your stitches are dissolving they should dissolve in one to one and a half week.      If you are able to take acetaminophen (\"Tylenol,\" etc.) and ibuprofen (\"Advil\" or \"Motrin,\" etc.), then you may STAGGER these medications by taking 400 mg of ibuprofen (usually two tabs) every 8 hours and 1,000 mg of acetaminophen (e.g., two tabs of extra-strength Tylenol) every 8 hours.    This means, for example, that you could take the followin,000 mg of Tylenol, followed 4 hours later by 400 mg " Ibuprofen, followed 4 hours later with 1,000 mg of Tylenol, followed 4 hours later by 400 mg Ibuprofen, followed 4 hours later with 1,000 mg of Tylenol, and so forth.     Essentially, you can take either 1,000 mg of Tylenol or 400 mg of ibuprofen in alternating fashion EVERY FOUR HOURS.    Do NOT exceed more than 4,000 mg of Tylenol or 3,200 mg of ibuprofen per 24 hours. If you are not able to take Tylenol or ibuprofen as above due to other health issues (or a physician has told you directly that you are not allowed to take one of them, say due to pre-existing severe liver or kidney issues), then disregard the above directions.    Scientific evidence supports that this combination/schedule of pain medications is just as effective, if not more effective, than taking a narcotic pain medicine.       Follow up will be a 3 month scar evaluation either in person or via a telephone visit with you sending in a photo via [employer]. Unless you have been told to follow up sooner or if you have concerns and would like to be see sooner. Please call or send us in a [employer] message if possible and attach a photo.        What to expect:    The first couple of days your wound may be tender and may bleed slightly when doing wound care.  There may be swelling and bruising around the wound, especially if it is near the eyes. For your comfort, you may apply ice or cold compresses to the bruises after your have removed the pressure bandage.  The area around your wound may be numb for several weeks or even months.  You may experience periodic sharp pain or mild itching around the wound as it heals.   The suture line will look dark for a while but will lighten over time.       When to call us:    You have bleeding that will not stop after applying pressure and ice.  You have pain that is not controlled with Tylenol (acetaminophen.)  You have signs or symptoms of an infection such as:  Fever over 100 degrees Fahrenheit  Redness, warmth or  foul-smelling drainage from the wound  If you have any questions, or are not sure how to take care of the wound.    Phone numbers:    During business hours (M-F 8:00-4:30 p.m.)    [facility]- [phone] (Option 1 appt. Ask the call representative for the Dermatology Surgery Team)    For Dermatology Surgery scheduling please call Zoe at [phone]    ---------------------------------------------------------  Evenings/Weekends/Holidays    Hospital - [phone] (Ask for the dermatology resident on call. They will connect with the surgery Fellow)  TTY for hearing ckjujtwa-[phone]  *Ask  to page dermatologist on-call  Emergency Xyjx-[phone]  TTY for hearing impaired- [phone]

## 2024-06-24 NOTE — LETTER
6/24/2024       RE: Aubrey Duncan  [address]  [place] [address]     Dear Colleague,    Thank you for referring your patient, Aubrey Duncan, to the [facility] DERMATOLOGIC SURGERY CL[facility]IC [place] at [facility]. Please see a copy of my visit note below.    [facility] Dermatologic Surgery Clinic [place] Procedure Note      Date of Service:  Jun 24, 2024  Surgery: Mohs micrographic surgery    Case 1  Repair Type: rhombic  Repair Size: 3x2 cm  Suture Material: 4.0 mono, 5.0 FAG  Tumor Type: SCCIS/BCC  Location: L temple  Derm-Path Accession #: [id number]   PreOp Size: 0.8x0.7 cm  PostOp Size: 1.7x1.5 cm  Mohs Accession #:   Level of Defect: fascia      Procedure:  We discussed the principles of treatment and most likely complications including scarring, bleeding, infection, swelling, pain, crusting, nerve damage, large wound,  incomplete excision, wound dehiscence,  nerve damage, recurrence, and a second procedure may be recommended to obtain the best cosmetic or functional result.    Informed consent was obtained and the patient underwent the procedure as follows:  The patient was placed supine on the operating table.  The cancer was identified, outlined with a marker, and verified by the patient.  The entire surgical field was prepped with chlorhexadine.  The surgical site was anesthetized using lidocaine with epi.      The area of clinically apparent tumor was debulked. The layer of tissue was then surgically excised using a #15 blade and was then transferred onto a specimen sheet maintaining the orientation of the specimen. Hemostasis was obtained using electrofulguration. The wound site was then covered with a dressing while the tissue samples were processed for examination.    The excised tissue was transported to the [facility] histology laboratory maintaining the tissue orientation.  The tissue specimen was relaxed so that the entire surgical  margin was in a a single horizontal plane for sectioning and inked for precise mapping.  A precise reference map was drawn to reflect the sectioning of the specimen, colored inking of the margins, and orientation on the patient. The tissue was processed using horizontal sectioning of the base and continuous peripheral margins.  The histopathologic sections were reviewed in conjunction with the reference map.    Total blocks: 1    Total slides:  3    There were no cancer cells visualized on examination, therefore Mohs surgery was complete.    PROCEDURE: Rhombic Transposition Flap  The patient was taken to the operative suite and placed supine on the operating room table.  The wound was identified and infiltrated with 1% ldo with epi.  The defect was then cleansed and prepped with chlorhexadine and draped with sterile drapes.  Using a marker, a rhomboid transposition flap repair was planned.  The wound edges were then debeveled and the wound was undermined bluntly in all directions.  The transposition flap was incised sharply to the level of fat.  The flap was undermined from all surrounding tissue. Hemostasis was obtained with electrocoagulation.  The flap was transposed into the primary defect.  The secondary defect and flap closed with deep dermal vicryl sutures Epidermal tissue was carefully approximated using 5.0 FAG epidermal sutures throughout the length of the flap.  Redundant skin was excised by the triangulation technique, and closed in similar fashion.  The wound was cleansed with sterile saline and polysporin was applied. A sterile non-adherent pressure dressing was placed.  The patient left the operating suite in stable condition.  The patient will return n/a for suture removal. Wound care was reviewed verbally and in writing. The patient was counseled that revisionary procedures may need to be used as a second stage of this reconstruction.     Dr. velazquez was present for the entire procedure and always  immediately available.    [facility] Dermatologic Surgery Clinic [place] Procedure Note      Date of Service:  Jun 24, 2024  Surgery: Mohs micrographic surgery    Case 2  Repair Type: complex  Repair Size: 4 cm  Suture Material: 4.0 mono, 5.0 FAG  Tumor Type: BCC  Location: R facial cheek  Derm-Path Accession #: [id number]   PreOp Size: 0.7x0.6 cm  PostOp Size: 2.3x2.2 cm  Mohs Accession #:   Level of Defect: fat      Procedure:  We discussed the principles of treatment and most likely complications including scarring, bleeding, infection, swelling, pain, crusting, nerve damage, large wound,  incomplete excision, wound dehiscence,  nerve damage, recurrence, and a second procedure may be recommended to obtain the best cosmetic or functional result.    Informed consent was obtained and the patient underwent the procedure as follows:  The patient was placed supine on the operating table.  The cancer was identified, outlined with a marker, and verified by the patient.  The entire surgical field was prepped with chlorhexadine.  The surgical site was anesthetized using 1% lidocaine with epi.      The area of clinically apparent tumor was debulked. The layer of tissue was then surgically excised using a #15 blade and was then transferred onto a specimen sheet maintaining the orientation of the specimen. Hemostasis was obtained using electrofulturation. The wound site was then covered with a dressing while the tissue samples were processed for examination.    The excised tissue was transported to the [facility] histology laboratory maintaining the tissue orientation.  The tissue specimen was relaxed so that the entire surgical margin was in a a single horizontal plane for sectioning and inked for precise mapping.  A precise reference map was drawn to reflect the sectioning of the specimen, colored inking of the margins, and orientation on the patient.  The tissue was processed using horizontal sectioning of the  base and continuous peripheral margins.  The histopathologic sections were reviewed in conjunction with the reference map.    Total blocks: 1    Total slides:  2    There were no cancer cells visualized on examination, therefore Mohs surgery was complete.     Reconstruction: Complex Linear Closure    Due to one or more of the following factors, complex linear closure was required/indicated: Extensive undermining (equal to or greater than the maximum perpendicular width of the defect), exposure of underlying structure (bone, cartilage, tendon, named neurovascular), free margin involvement (helical rim, vermillion border, nostril rim), and/or placement of retention sutures.     The patient was taken to the operative suite and placed supine on the operating room table.  The defect was identified.  Appropriate markings were made with a marking pen to plan the repair.  The area was infiltrated with Lidocaine 1% with epi 1:100,000 and prepped with Hibiclens and draped with sterile towels.     The wound was debeveled and undermined widely.  Cones were excised within relaxed skin tension lines on both sides of the defect.  Hemostasis was obtained using electrocoagulation. A fascial plication suture using 4.0 monocryl suture material was placed to narrow the primary defect. Subcutaneous tissues were then approximated using  buried vertical mattress sutures.  The wound edges were then approximated additional  buried sutures were placed in a similar fashion where needed.  Percutaneous simple running 5.0 FAG sutures were carefully placed for maximum eversion and meticulous approximation.    Repair Size: 4 cm    The wound was cleansed with saline and ointment was applied along the wound surface.     A sterile pressure dressing was applied.  Wound care instructions were given verbally and in writing.  The patient left the operating suite in stable condition.  Patient was informed that additional refinement of the resulting  surgical scar may be used as a second stage of this reconstruction.     The attending surgeon was present for the entire procedure and always immediately available.    Shave biopsy: Location(s) R lateral cheek, R temple (AK vs. BCC vs. SCC).  After discussion of benefits and risks including but not limited to bleeding/bruising, pain/swelling, infection, scar, incomplete removal, nerve damage/numbness, recurrence, and non-diagnostic biopsy,  verbal consent and photographs were obtained. Time-out was performed. The area was cleaned with isopropyl alcohol. 0.5mL of 1% lidocaine with epinephrine was injected to obtain adequate anesthesia of each lesion. Shave biopsy was performed. Hemostasis was achieved with aluminium chloride. Vaseline and a sterile dressing were applied. The patient tolerated the procedure and no complications were noted. The patient was provided with verbal and written post care instructions.       Attending attestation:  I personally performed the entire procedure.  I have reviewed the note and edited it as necessary, and agree with its contents.    Juwan Wilson M.D.  Professor  Director of Dermatologic Surgery  Department of Dermatology  [facility]    Dermatology Surgery Clinic  [facility] [place] [phone]

## 2024-06-25 ENCOUNTER — OFFICE VISIT (OUTPATIENT)
Dept: CARDIOLOGY | Facility: CLINIC | Age: 69
End: 2024-06-25
Payer: MEDICARE

## 2024-06-25 VITALS
WEIGHT: 167.6 LBS | HEART RATE: 81 BPM | OXYGEN SATURATION: 94 % | BODY MASS INDEX: 25.48 KG/M2 | DIASTOLIC BLOOD PRESSURE: 80 MMHG | SYSTOLIC BLOOD PRESSURE: 134 MMHG

## 2024-06-25 DIAGNOSIS — I25.110 CORONARY ARTERY DISEASE INVOLVING NATIVE CORONARY ARTERY OF NATIVE HEART WITH UNSTABLE ANGINA PECTORIS (H): Primary | ICD-10-CM

## 2024-06-25 LAB
PATH REPORT.COMMENTS IMP SPEC: NORMAL
PATH REPORT.COMMENTS IMP SPEC: NORMAL
PATH REPORT.FINAL DX SPEC: NORMAL
PATH REPORT.GROSS SPEC: NORMAL
PATH REPORT.MICROSCOPIC SPEC OTHER STN: NORMAL
PATH REPORT.RELEVANT HX SPEC: NORMAL

## 2024-06-25 PROCEDURE — 99215 OFFICE O/P EST HI 40 MIN: CPT | Mod: 25

## 2024-06-25 PROCEDURE — 93000 ELECTROCARDIOGRAM COMPLETE: CPT

## 2024-06-25 ASSESSMENT — PAIN SCALES - GENERAL: PAINLEVEL: NO PAIN (0)

## 2024-06-25 NOTE — PROGRESS NOTES
"  [facility]   Cardiology Clinic Note      HPI:   Mr. 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 He presents to cardiology clinic for chest discomfort     He presented to [facility] on 1/11, reported with left-sided/substernal chest pain with exertion that had been occurring for 1 week, decreased at rest. Troponin 22->23, EKG without ischemic changes. He underwent coronary angiogram 1/11/23 showed severe multivessel disease with 80% stenosis in prox-mid LAD and heavily calcified stenosis in mid-RCA. He underwent PCI x1 to LAD, with plan to return for staged PCI. He then underwent staged PCI on 2/16 now s/p MEGHNA x1 to RCA. At that post-cath visit, he reported feeling well with some mild dyspnea that he felt was related to his lung disease.    On 6/21/24, patient contacted the cardiology clinic with complaints of chest discomfort. He reported \"I have been lifting weights and working with my arms I get an ache type feeling in my chest. Also having heart burn. Wake up early morning with it also. Belching up. My doctor here in [place] wanted me 2 write and tell u.\" He reported waking up around 2 am with heartburn. He feels it may be related to the gabapentin he was recently started on for his leg pain. He increased his Prilosec (x2) and started taking Tums. He had not tried nitroglycerin for these symptoms and was encouraged to try it the next time he experiences chest discomfort.     Today in clinic, patient reports that he has been having this discomfort in his chest for several months and he notices it when he is lifting weights. The sensation goes away when he stops lifting. He describes the sensation as " "an ache. He also reports continued chest tightness with deep breathing that has been present since his lung transplant.   He reports that he has noticed an increase in the severity of his heartburn symptoms for a few weeks. It almost always occurs in the middle of the night and is accompanied by \"belching up acid\". He has been taking Tums and eating a piece of bread before bed which he results has really improved his symptoms. He was also instructed to switch from Omeprazole to Pantoprazole, but hasn't been home to his pharmacy to pick it up yet. He reports that his heartburn symptoms are also associated with what he eats. For example, he had [employer] the other night which resulted in an upset stomach and diarrhea.   He denies any exertional symptoms, shortness of breath (beyond baseline level since transplant).   His other concern is fatigue that started after he started taking Gabapentin. He recently stopped taking this medication about 1 week ago as it caused him significant GI distress. He continues to have low energy and is now taking a lot of naps which is unusual for him. Patient's wife also endorses significant stress for the patient as he will likely need his R leg amputated in the near future due to his neuropathy.     Today he denies palpitations, dizziness, syncope, or lower extremity edema.       PAST MEDICAL HISTORY:  Past Medical History:   Diagnosis Date    Acute postoperative pain 09/11/2013    Alpha-1-antitrypsin deficiency (H)     Arthritis     Basal cell carcinoma     CMV (cytomegalovirus infection) (H)     Reacttivation Sept 2013 when valcyte held    DVT of upper extremity (deep vein thrombosis) (H) 09/2013    Nonocclusive thrombosis extending from the right subclavian vein to the right axillary vein,  Segmental occlusion of right basilic vein in the upper arm. 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 Mother     Cerebrovascular Disease Father          at age 83 with ministrokes; had arthritis as a farmer    Asthma Sister     Diabetes Sister     Hypertension Sister     Other - See Comments Sister         bleeding disorder    Hypertension Daughter     Other - See Comments Daughter         fibromyalgia    Skin Cancer No family hx of     Melanoma No family hx of     Glaucoma No family hx of     Macular Degeneration No family hx of        SOCIAL HISTORY:  Social History     Socioeconomic History    Marital status:      Spouse name: Kyung    Number of children: 1   Occupational History    Occupation: Sanitation business owner; construction     Employer: [employer]   Tobacco Use    Smoking status: Former     Current packs/day: 0.00     Average packs/day: 2.0 packs/day for 15.0 years (30.0 ttl pk-yrs)     Types: Cigarettes     Start date: 1971     Quit date: 1986     Years since quittin.5     Passive exposure: Past    Smokeless tobacco: Never   Vaping Use    Vaping status: Never Used   Substance and Sexual Activity    Alcohol use: No     Alcohol/week: 0.0 standard drinks of alcohol    Drug use: No    Sexual activity: Yes     Partners: Female     Social Determinants of Health     Financial Resource Strain: Unknown (3/18/2024)    Financial Resource Strain     Within the past 12 months,  have you or your family members you live with been unable to get utilities (heat, electricity) when it was really needed?: Patient declined   Food Insecurity: No Food Insecurity (3/22/2024)    [facility]    Hunger Vital Sign     Worried About Running Out of Food in the Last Year: Never true     Ran Out of Food in the Last Year: Never true   Transportation Needs: No Transportation Needs (3/22/2024)    [facility]    PRAPARE - Transportation     Lack of Transportation (Medical): No     Lack of Transportation (Non-Medical): No   Interpersonal Safety: Low Risk  (6/21/2024)    Interpersonal Safety     Do you feel physically and emotionally safe where you currently live?: Yes     Within the past 12 months, have you been hit, slapped, kicked or otherwise physically hurt by someone?: No     Within the past 12 months, have you been humiliated or emotionally abused in other ways by your partner or ex-partner?: No   Housing Stability: Low Risk  (3/22/2024)    Received from [facility]     Retired - What is your living situation today? : I have a steady place to live       CURRENT MEDICATIONS:  Current Outpatient Medications   Medication Sig Dispense Refill    acetaminophen (TYLENOL) 325 MG tablet Take 2 tablets (650 mg) by mouth every 6 hours as needed for mild pain 60 tablet 0    acyclovir (ZOVIRAX) 400 MG tablet Take 1 tablet (400 mg) by mouth 2 times daily 60 tablet 3    albuterol (PROAIR HFA/PROVENTIL HFA/VENTOLIN HFA) 108 (90 Base) MCG/ACT inhaler Inhale 1-2 puffs into the lungs every 6 hours as needed for shortness of breath or wheezing 8.5 g 3    amLODIPine (NORVASC) 10 MG tablet TAKE 1 TABLET (10 MG) BY MOUTH DAILY 90 tablet 3    aspirin (ASA) 81 MG EC tablet Take 1  tablet (81 mg) by mouth daily 90 tablet 3    azithromycin (ZITHROMAX) 250 MG tablet Take 250 mg by mouth Every Mon, Wed, Fri Morning      blood glucose (NO BRAND SPECIFIED) test strip USE TO TEST BLOOD SUGAR 3 TIMES DAILY. DIAG CODE: E11.9 300 strip 0    Calcium Carbonate-Vitamin D 600-10 MG-MCG TABS Take 1 tablet by mouth 2 times daily (with meals) 60 tablet 11    carvedilol (COREG) 6.25 MG tablet TAKE 1 TABLET (6.25 MG) BY MOUTH 2 TIMES DAILY (WITH MEALS) 120 tablet 3    clopidogrel (PLAVIX) 75 MG tablet Take 1 tablet (75 mg) by mouth daily 90 tablet 3    dapsone (ACZONE) 25 MG tablet Take 2 tablets (50 mg) by mouth daily 180 tablet 3    DULoxetine (CYMBALTA) 20 MG capsule Take 1 capsule (20 mg) by mouth daily 30 capsule 3    econazole nitrate 1 % external cream APPLY TOPICALLY DAILY TO FEET AND HEELS. 85 g 3    fludrocortisone (FLORINEF) 0.1 MG tablet Take 1 tablet (0.1 mg) by mouth daily 90 tablet 3    fluticasone-salmeterol (ADVAIR-HFA) 230-21 MCG/ACT inhaler Inhale 2 puffs into the lungs 2 times daily 8 g 11    furosemide (LASIX) 20 MG tablet Take 1 tablet (20 mg) by mouth daily 90 tablet 4    insulin aspart (NOVOLOG PEN) 100 UNIT/ML pen Take 5 U am, 3 unit(s) non, 5 unit(s) pm of insulin within 30 minutes of start of breakfast, lunch, and dinner. Do not give if blood sugar is less than 70 mg/dl.      insulin glargine (LANTUS PEN) 100 UNIT/ML pen Inject 18 Units Subcutaneous every morning (before breakfast)      insulin pen needle (32G X 4 MM) 32G X 4 MM miscellaneous Use 4 pen needles daily or as directed. Dispense as insurance allows. Dx. Code: E09.9 400 each 11    Lancet Devices (MICROLET NEXT LANCING DEVICE) MISC USE AS DIRECTED 1 each 3    lisinopril (ZESTRIL) 40 MG tablet TAKE 1 TABLET (40 MG) BY MOUTH DAILY IN THE MORNING 90 tablet 0    loperamide (IMODIUM) 2 MG capsule Take 1 capsule (2 mg) by mouth 4 times daily as needed for diarrhea 120 capsule 12    magnesium oxide (MAG-OX) 400 MG tablet Take 1  tablet (400 mg) by mouth 2 times daily 180 tablet 3    Microlet Lancets MISC CHECK BLOOD SUGAR FOUR TIMES DAILY E11.9 400 each 1    montelukast (SINGULAIR) 10 MG tablet Take 1 tablet (10 mg) by mouth every evening 90 tablet 3    multivitamin, therapeutic (THERA-VIT) TABS Take 1 tablet by mouth daily 30 tablet 12    order for DME Equipment being ordered: diabetic shoes 1 each 0    pantoprazole (PROTONIX) 40 MG EC tablet Take 1 tablet (40 mg) by mouth daily 90 tablet 1    predniSONE (DELTASONE) 5 MG tablet TAKE ONE TABLET BY MOUTH ONCE DAILY IN THE MORNING AND ONE-HALF TABLET IN THE EVENING 45 tablet 12    rosuvastatin (CRESTOR) 40 MG tablet Take 20 mg by mouth Every Mon, Wed, Fri Morning      sildenafil (VIAGRA) 25 MG tablet Take 1 tablet (25 mg) by mouth as needed (as needed) 32 tablet 11    tacrolimus (GENERIC EQUIVALENT) 0.5 MG capsule Take 1 capsule (0.5 mg) by mouth daily Total dose: 3 mg in the AM and 3 mg in the PM ON HOLD 4/18/24 FOR DOSE ADJUSTMENTS      tacrolimus (GENERIC EQUIVALENT) 1 MG capsule Take 3 capsules (3 mg) by mouth every morning AND 3 capsules (3 mg) every evening. Total dose: 3 mg in AM and 3 mg in  capsule 11     No current facility-administered medications for this visit.       ROS:   Refer to HPI    EXAM:  /80 (BP Location: Right arm, Patient Position: Supine, Cuff Size: Adult Regular)   Pulse 81   Wt 76 kg (167 lb 9.6 oz)   SpO2 94%   BMI 25.48 kg/m    GENERAL: Appears comfortable, in no acute distress.   HEENT: Eye symmetrical, no discharge or icterus bilaterally.   CV: RRR, +S1S2   RESPIRATORY: Respirations regular, even, and unlabored. Lungs CTA throughout.   GI: Soft and non distended.   EXTREMITIES: no peripheral edema.  NEUROLOGIC: Alert and oriented x 3. No focal deficits.   MUSCULOSKELETAL: No joint swelling or tenderness.   SKIN: No jaundice. No rashes or lesions.     Labs, reviewed with patient in clinic today:  CBC RESULTS:  Lab Results   Component Value Date     "WBC 11.6 (H) 05/16/2024    WBC 6.2 06/03/2021    RBC 3.05 (L) 05/16/2024    RBC 3.83 (L) 06/03/2021    HGB 10.3 (L) 05/16/2024    HGB 13.0 (L) 06/03/2021    HCT 32.3 (L) 05/16/2024    HCT 40.1 06/03/2021     (H) 05/16/2024     (H) 06/03/2021    MCH 33.8 (H) 05/16/2024    MCH 33.9 (H) 06/03/2021    MCHC 31.9 05/16/2024    MCHC 32.4 06/03/2021    RDW 16.5 (H) 05/16/2024    RDW 13.1 06/03/2021     05/16/2024     06/03/2021       CMP RESULTS:  Lab Results   Component Value Date     05/16/2024     06/03/2021    POTASSIUM 4.0 05/16/2024    POTASSIUM 4.5 10/26/2022    POTASSIUM 4.6 06/03/2021    CHLORIDE 109 (H) 05/16/2024    CHLORIDE 110 (H) 11/09/2022    CHLORIDE 105 06/03/2021    CO2 24 05/16/2024    CO2 28 10/26/2022    CO2 26 06/03/2021    ANIONGAP 9 05/16/2024    ANIONGAP 6 10/26/2022    ANIONGAP 9 06/03/2021    GLC 95 06/05/2024    GLC 92 10/26/2022    GLC 96 06/03/2021    BUN 29.2 (H) 05/16/2024    BUN 36 (H) 10/26/2022    BUN 38 (H) 06/03/2021    CR 1.15 05/16/2024    CR 1.29 06/03/2021    GFRESTIMATED 68 05/16/2024    GFRESTIMATED 59 (L) 07/21/2022    GFRESTIMATED 56 (L) 06/03/2021    GFRESTBLACK 67 06/03/2021    ERIN 8.1 (L) 05/16/2024    ERIN 8.9 06/03/2021    BILITOTAL 0.5 04/16/2024    BILITOTAL 0.3 05/12/2021    ALBUMIN 3.7 04/16/2024    ALBUMIN 3.9 10/12/2022    ALBUMIN 3.8 05/12/2021    ALKPHOS 47 04/16/2024    ALKPHOS 38 05/12/2021    ALT 51 04/16/2024    ALT 55 (H) 05/12/2021    AST 43 04/16/2024    AST 43 (H) 05/12/2021        INR RESULTS:  Lab Results   Component Value Date    INR 1.03 03/21/2024    INR 1.00 10/30/2020       Lab Results   Component Value Date    MAG 1.9 05/16/2024    MAG 1.8 (L) 06/03/2021     Lab Results   Component Value Date    NTBNPI 212 (H) 02/24/2019     No results found for: \"NTBNP\"    LIPIDS:  Lab Results   Component Value Date    CHOL 146 04/03/2024    CHOL 143 10/30/2020     Lab Results   Component Value Date    HDL 42 04/03/2024    HDL " 46 10/30/2020     Lab Results   Component Value Date    LDL 47 2024    LDL 62 10/30/2020     Lab Results   Component Value Date    TRIG 284 2024    TRIG 175 10/30/2020     Lab Results   Component Value Date    CHOLHDLRATIO 4.2 10/06/2014       EKG:    Echocardiogram:  Recent Results (from the past 4320 hour(s))   Echocardiogram Complete   Result Value    LVEF  55-60%    [facility]    [id number]  [id number]  [id number]  [id number]^JOSLYN^VINNY     [facility]  Echocardiography Laboratory  [address] [place] [id number]     Name: YARED HAMLIN  MRN: [id number]  : 1955  Study Date: 2024 09:15 AM  Age: 68 yrs  Gender: Male  Patient Location: [facility]  Reason For Study: Unstable Angina  Ordering Physician: VINNY JORGENSEN  Referring Physician: CARLOS MC  Performed By: Abe Marino RDCS     BSA: 1.9 m2  Height: 68 in  Weight: 162 lb  HR: 73  BP: 137/84 mmHg  ______________________________________________________________________________  Procedure  Complete Portable Echo Adult. Contrast Optison. Optison (NDC #1249-0430-95)  given intravenously. Patient was given 6 ml mixture of 3 ml Optison and 6 ml  saline. 3 ml wasted.  ______________________________________________________________________________  Interpretation Summary  Global and regional left ventricular function is normal with an EF of 55-60%.  The right ventricle is normal size. Global right ventricular function is  normal.  No significant valvular disease.  No pericardial effusion.  Normal IVC.     This study was compared with the study from 2012. There has been no  change.  ______________________________________________________________________________  Left Ventricle  Left ventricular size is normal. Global and regional left ventricular function  is normal with an EF of 55-60%. Mild concentric wall thickening consistent  with left ventricular hypertrophy is present. Left ventricular  diastolic  function is indeterminate. Diastolic Doppler findings (E/E' ratio and/or other  parameters) suggest left ventricular filling pressures are normal. No regional  wall motion abnormalities are seen.     Right Ventricle  Global right ventricular function is normal. The right ventricle is normal  size.     Atria  The right atria appears normal. Moderate left atrial enlargement is present.     Mitral Valve  The mitral valve is normal.     Aortic Valve  Aortic valve is normal in structure and function. The aortic valve is  tricuspid.     Tricuspid Valve  The tricuspid valve is normal. The peak velocity of the tricuspid regurgitant  jet is not obtainable. Pulmonary artery systolic pressure cannot be assessed.     Pulmonic Valve  The pulmonic valve is normal.     Vessels  The inferior vena cava was normal in size with preserved respiratory  variability. The aorta root is normal. The thoracic aorta is normal. Sinuses  of Valsalva 3.5 cm. Ascending aorta 3.5 cm.     Pericardium  Prominent epicardial fat is noted. No pericardial effusion is present.     Compared to Previous Study  This study was compared with the study from 7/31/2012 . There has been no  change.     Attestation  I have personally viewed the imaging and agree with the interpretation and  report as documented by the fellow, Jorge Luis Reyes Castro, and/or edited by  me.  ______________________________________________________________________________  MMode/2D Measurements & Calculations  IVSd: 1.2 cm  LVIDd: 5.0 cm  LVIDs: 3.7 cm  LVPWd: 1.2 cm  FS: 27.1 %  LV mass(C)d: 237.5 grams  LV mass(C)dI: 127.1 grams/m2  asc Aorta Diam: 3.5 cm  LVOT diam: 2.8 cm  LVOT area: 6.0 cm2  Asc Ao diam index BSA (cm/m2): 1.9  Asc Ao diam index Ht(cm/m): 2.0     LA Volume Index (BP): 43.9 ml/m2  RWT: 0.47  TAPSE: 1.4 cm     Doppler Measurements & Calculations  MV E max chriss: 38.2 cm/sec  MV A max chriss: 67.8 cm/sec  MV E/A: 0.56  MV dec slope: 223.9 cm/sec2  MV dec time:  0.17 sec  PA acc time: 0.10 sec  E/E' av.5  Lateral E/e': 10.1  Medial E/e': 5.0     ______________________________________________________________________________  Report approved by: Rach Pastor 2024 11:04 AM             Assessment and Plan:   Mr. 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      # Atypical chest pain   Patient's symptoms appear most consistent with GERD as they happen primarily at night, are accompanied by belching, and improved with TUMS. He also appears to have some component of MSK chest discomfort that he only experiences with weight lifting. I encouraged patient to palpate the area and stretch the next time he feels this discomfort. If that does not change the sensation, I instructed him to take a SL nitroglycerin and notify us with the result.   - RN to call in 1-2 weeks to assess symptoms  - Virtual visit in 2-3 months     # CAD s/p PCI w/ MEGHNA x1 to LAD (24), PCI w/ MEGHNA x1 to RCA (24)  # Dyslipidemia  ED visit  for exertional chest pain, troponins flat, EKG with no changes. Given risk factors of DM, HLD, and HTN, taken to cath lab for invasive angiogram, which revealed severe multivessel disease. Now s/p MEGHNA x1 to LAD (Synergy XD 3.5 x 24 mm )Now s/p staged PCI of RCA with MEGHNA x1 (3.5 x 20 mm Synergy XD)  Most recent LDL 60  - DAPT: asa 81mg + Plavix 75mg daily for 1 year, then asa 81 monotherapy lifelong   - Rosuvastatin 20mg MWF      # HTN  Well-controlled at home  - cont amlodipine 10mg daily  - carvedilol 6.25mg BID  - lisinopril 40mg every day    Follow up:  2-3 months; virtual okay  Chart review time today: 30 minutes  Visit time today: 20 minutes  Total time spent today: 50  minutes        Yessenia Pryor PA-C  General Cardiology   06/25/24

## 2024-06-25 NOTE — NURSING NOTE
Med Reconcile: Reviewed and verified all current medications with the patient. The updated medication list was printed and given to the patient./ No medication changes.       Return Appointment:   -Follow-up in 2-3 months  -Nurse will call in 1-2 weeks for symptom update       Patient stated he understood all health information given and agreed to call with further questions or concerns.

## 2024-06-25 NOTE — NURSING NOTE
"Chief Complaint   Patient presents with    Follow Up       Initial /80 (BP Location: Right arm, Patient Position: Supine, Cuff Size: Adult Regular)   Pulse 81   Wt 76 kg (167 lb 9.6 oz)   SpO2 94%   BMI 25.48 kg/m   Estimated body mass index is 25.48 kg/m  as calculated from the following:    Height as of 6/5/24: 1.727 m (5' 8\").    Weight as of this encounter: 76 kg (167 lb 9.6 oz)..  BP completed using cuff size: regular    Kenisha Fajardo VF  "

## 2024-06-25 NOTE — PATIENT INSTRUCTIONS
Take your medicines every day, as directed     Changes made today:  No medication changes   Nurse will call you in 1-2 weeks to get an update        Cardiology Care Coordinators:      Leanne AVALOS RN     Cardiology Rooming staff:  Belkis WHEELER CNA    Phone  [phone]      Fax [phone]    To Contact us     During Business Hours:  [phone]     If you are needing refills please contact your pharmacy.     For urgent after hour care please call the [facility] at [phone] or the [facility] at [phone] and ask to speak to the cardiologist on call.            HOW TO CHECK YOUR BLOOD PRESSURE AT HOME:     Avoid eating, smoking, and exercising for at least 30 minutes before taking a reading.     Be sure you have taken your BP medication at least 2-3 hours before you check it.      Sit quietly for 10 minutes before a reading.      Sit in a chair with your feet flat on the floor. Rest your  arm on a table so that the arm cuff is at the same level as your heart.     Remain still during the reading.  Record your blood pressure and pulse in a log and bring to your next appointment.       [employer] allows you to communicate directly with your heart team through secure messaging.  [employer] can be accessed any time on your phone, computer, or tablet.  If you need assistance, we'd be happy to help!             Keep your Heart Appointments:     Follow-up with Yessenia Pryor in 2-3 months ( virtual)

## 2024-06-26 ENCOUNTER — HOSPITAL ENCOUNTER (OUTPATIENT)
Dept: PET IMAGING | Facility: OTHER | Age: 69
Discharge: HOME | End: 2024-06-26
Attending: INTERNAL MEDICINE | Admitting: FAMILY MEDICINE
Payer: MEDICARE

## 2024-06-26 DIAGNOSIS — D47.Z1 PTLD (POST-TRANSPLANT LYMPHOPROLIFERATIVE DISORDER) (H): ICD-10-CM

## 2024-06-26 LAB
ATRIAL RATE - MUSE: 81 BPM
DIASTOLIC BLOOD PRESSURE - MUSE: NORMAL MMHG
INTERPRETATION ECG - MUSE: NORMAL
P AXIS - MUSE: 21 DEGREES
PR INTERVAL - MUSE: 168 MS
QRS DURATION - MUSE: 78 MS
QT - MUSE: 362 MS
QTC - MUSE: 420 MS
R AXIS - MUSE: -31 DEGREES
SYSTOLIC BLOOD PRESSURE - MUSE: NORMAL MMHG
T AXIS - MUSE: 109 DEGREES
VENTRICULAR RATE- MUSE: 81 BPM

## 2024-06-26 PROCEDURE — 343N000001 HC RX 343: Performed by: INTERNAL MEDICINE

## 2024-06-26 PROCEDURE — A9552 F18 FDG: HCPCS | Performed by: INTERNAL MEDICINE

## 2024-06-26 PROCEDURE — 78816 PET IMAGE W/CT FULL BODY: CPT | Mod: MG,PS

## 2024-06-26 RX ORDER — FLUDEOXYGLUCOSE F 18 200 MCI/ML
11.98 INJECTION, SOLUTION INTRAVENOUS ONCE
Status: COMPLETED | OUTPATIENT
Start: 2024-06-26 | End: 2024-06-26

## 2024-06-26 RX ADMIN — FLUDEOXYGLUCOSE F 18 11.98 MILLICURIE: 200 INJECTION, SOLUTION INTRAVENOUS at 15:16

## 2024-06-28 ENCOUNTER — HOSPITAL ENCOUNTER (EMERGENCY)
Facility: OTHER | Age: 69
Discharge: HOME | End: 2024-06-28
Payer: MEDICARE

## 2024-06-28 ENCOUNTER — APPOINTMENT (OUTPATIENT)
Dept: CT IMAGING | Facility: OTHER | Age: 69
End: 2024-06-28
Payer: MEDICARE

## 2024-06-28 VITALS
SYSTOLIC BLOOD PRESSURE: 124 MMHG | DIASTOLIC BLOOD PRESSURE: 68 MMHG | OXYGEN SATURATION: 96 % | TEMPERATURE: 96.2 F | HEART RATE: 64 BPM | RESPIRATION RATE: 15 BRPM | WEIGHT: 167 LBS | HEIGHT: 68 IN | BODY MASS INDEX: 25.31 KG/M2

## 2024-06-28 DIAGNOSIS — R42 DIZZINESS: ICD-10-CM

## 2024-06-28 DIAGNOSIS — R11.10 VOMITING: ICD-10-CM

## 2024-06-28 DIAGNOSIS — E16.2 HYPOGLYCEMIA: ICD-10-CM

## 2024-06-28 LAB
ALBUMIN SERPL BCG-MCNC: 3.4 G/DL (ref 3.5–5.2)
ALP SERPL-CCNC: 72 U/L (ref 40–150)
ALT SERPL W P-5'-P-CCNC: 47 U/L (ref 0–70)
ANION GAP SERPL CALCULATED.3IONS-SCNC: 9 MMOL/L (ref 7–15)
AST SERPL W P-5'-P-CCNC: 41 U/L (ref 0–45)
BASOPHILS # BLD MANUAL: 0 10E3/UL (ref 0–0.2)
BASOPHILS NFR BLD MANUAL: 0 %
BILIRUB SERPL-MCNC: 0.3 MG/DL
BUN SERPL-MCNC: 30.8 MG/DL (ref 8–23)
CALCIUM SERPL-MCNC: 8 MG/DL (ref 8.8–10.2)
CHLORIDE SERPL-SCNC: 106 MMOL/L (ref 98–107)
CREAT SERPL-MCNC: 1.01 MG/DL (ref 0.67–1.17)
DEPRECATED HCO3 PLAS-SCNC: 24 MMOL/L (ref 22–29)
EGFRCR SERPLBLD CKD-EPI 2021: 80 ML/MIN/1.73M2
EOSINOPHIL # BLD MANUAL: 0.3 10E3/UL (ref 0–0.7)
EOSINOPHIL NFR BLD MANUAL: 4 %
ERYTHROCYTE [DISTWIDTH] IN BLOOD BY AUTOMATED COUNT: 14.6 % (ref 10–15)
GLUCOSE BLDC GLUCOMTR-MCNC: 110 MG/DL (ref 70–99)
GLUCOSE BLDC GLUCOMTR-MCNC: 59 MG/DL (ref 70–99)
GLUCOSE SERPL-MCNC: 70 MG/DL (ref 70–99)
HCT VFR BLD AUTO: 32.4 % (ref 40–53)
HGB BLD-MCNC: 10 G/DL (ref 13.3–17.7)
LACTATE SERPL-SCNC: 0.7 MMOL/L (ref 0.7–2)
LYMPHOCYTES # BLD MANUAL: 3.3 10E3/UL (ref 0.8–5.3)
LYMPHOCYTES NFR BLD MANUAL: 48 %
MAGNESIUM SERPL-MCNC: 2 MG/DL (ref 1.7–2.3)
MCH RBC QN AUTO: 31.8 PG (ref 26.5–33)
MCHC RBC AUTO-ENTMCNC: 30.9 G/DL (ref 31.5–36.5)
MCV RBC AUTO: 103 FL (ref 78–100)
MONOCYTES # BLD MANUAL: 0.5 10E3/UL (ref 0–1.3)
MONOCYTES NFR BLD MANUAL: 8 %
NEUTROPHILS # BLD MANUAL: 2.7 10E3/UL (ref 1.6–8.3)
NEUTROPHILS NFR BLD MANUAL: 40 %
NRBC # BLD AUTO: 0 10E3/UL
NRBC BLD AUTO-RTO: 0 /100
PLAT MORPH BLD: ABNORMAL
PLATELET # BLD AUTO: 202 10E3/UL (ref 150–450)
POTASSIUM SERPL-SCNC: 3.7 MMOL/L (ref 3.4–5.3)
PROT SERPL-MCNC: 5.3 G/DL (ref 6.4–8.3)
RBC # BLD AUTO: 3.14 10E6/UL (ref 4.4–5.9)
RBC MORPH BLD: ABNORMAL
SODIUM SERPL-SCNC: 139 MMOL/L (ref 135–145)
TROPONIN T SERPL HS-MCNC: 24 NG/L
TROPONIN T SERPL HS-MCNC: 25 NG/L
VARIANT LYMPHS BLD QL SMEAR: PRESENT
WBC # BLD AUTO: 6.8 10E3/UL (ref 4–11)

## 2024-06-28 PROCEDURE — 82962 GLUCOSE BLOOD TEST: CPT | Mod: 91

## 2024-06-28 PROCEDURE — 70450 CT HEAD/BRAIN W/O DYE: CPT | Mod: MG

## 2024-06-28 PROCEDURE — 93010 ELECTROCARDIOGRAM REPORT: CPT | Performed by: INTERNAL MEDICINE

## 2024-06-28 PROCEDURE — 36415 COLL VENOUS BLD VENIPUNCTURE: CPT

## 2024-06-28 PROCEDURE — 85025 COMPLETE CBC W/AUTO DIFF WBC: CPT

## 2024-06-28 PROCEDURE — 84155 ASSAY OF PROTEIN SERUM: CPT

## 2024-06-28 PROCEDURE — 83605 ASSAY OF LACTIC ACID: CPT

## 2024-06-28 PROCEDURE — 82247 BILIRUBIN TOTAL: CPT

## 2024-06-28 PROCEDURE — 99285 EMERGENCY DEPT VISIT HI MDM: CPT | Mod: 25

## 2024-06-28 PROCEDURE — 85007 BL SMEAR W/DIFF WBC COUNT: CPT

## 2024-06-28 PROCEDURE — 93005 ELECTROCARDIOGRAM TRACING: CPT

## 2024-06-28 PROCEDURE — 84484 ASSAY OF TROPONIN QUANT: CPT

## 2024-06-28 PROCEDURE — 83735 ASSAY OF MAGNESIUM: CPT

## 2024-06-28 PROCEDURE — 99284 EMERGENCY DEPT VISIT MOD MDM: CPT

## 2024-06-28 ASSESSMENT — ENCOUNTER SYMPTOMS
CHILLS: 0
WOUND: 1
BLOOD IN STOOL: 0
WEAKNESS: 0
HEADACHES: 0
DIARRHEA: 1
VOMITING: 1
FEVER: 0
SHORTNESS OF BREATH: 0
NAUSEA: 1
SPEECH DIFFICULTY: 0
CONSTIPATION: 0

## 2024-06-28 ASSESSMENT — ACTIVITIES OF DAILY LIVING (ADL)
ADLS_ACUITY_SCORE: 36

## 2024-06-28 ASSESSMENT — VISUAL ACUITY: OU: 1

## 2024-06-28 ASSESSMENT — COLUMBIA-SUICIDE SEVERITY RATING SCALE - C-SSRS
6. HAVE YOU EVER DONE ANYTHING, STARTED TO DO ANYTHING, OR PREPARED TO DO ANYTHING TO END YOUR LIFE?: NO
1. IN THE PAST MONTH, HAVE YOU WISHED YOU WERE DEAD OR WISHED YOU COULD GO TO SLEEP AND NOT WAKE UP?: NO
2. HAVE YOU ACTUALLY HAD ANY THOUGHTS OF KILLING YOURSELF IN THE PAST MONTH?: NO

## 2024-06-28 NOTE — DISCHARGE INSTRUCTIONS
Aubrey,   I am not sure what exactly caused her symptoms of feeling nauseated and vomiting once this morning.  Could be that your blood pressure went low for short time when you try to have a bowel movement, could also be that your blood sugar was low and you taking her insulin.  We did find that your blood sugar this afternoon was 56, I would advise to monitor this closely at home and make sure that you are eating and drinking appropriately.  Please follow-up if you have new or worsening concerns chest pain shortness of breath passing out vomiting fevers or new or worsening concerns.  Follow-up with your primary care provider as needed as well as cardiology and vascular as scheduled

## 2024-06-28 NOTE — ED TRIAGE NOTES
"ED Nursing Triage Note (General)   ________________________________    Aubrey Duncan is a 68 year old Male that presents to triage via [facility] with complaints of hypotension.  Patient states he woke up this morning and was feeling off.  Patient took prescribed BP medication prior to checking his BP as he states this is his routine to take his medication and check his BP 1 hour after.  Patient states he was making breakfast when he began feeling nauseated and dizzy.  Patient states 1 episode of emesis prior to checking BP which was 106 systolic.  On EMS arrival BP was 70 systolic.  Patient is currently being treated for GI cancer and has a hx of lung transplant and immunotherapy.  Patient continues to feel nauseated on arrival, however, received a 150ml bolus in route which brought BP to 118 systolic. Patient started new antacid and pain medication yesterday. No changes to BP medications. Patient also has bilateral leg sores and states he has hx of neuropathy with potential plans for RLE amputation.   Significant symptoms had onset 1 hour(s) ago.    Vital signs:  Temp: (!) 96.2  F (35.7  C) Temp src: Tympanic BP: 118/67 Pulse: 73   Resp: 20 SpO2: 97 %     Height: 172.7 cm (5' 8\") Weight: 75.8 kg (167 lb)  Estimated body mass index is 25.39 kg/m  as calculated from the following:    Height as of this encounter: 1.727 m (5' 8\").    Weight as of this encounter: 75.8 kg (167 lb).    PRE HOSPITAL PRIOR LIVING SITUATION Spouse      Triage Assessment (Adult)       Row Name 06/28/24 0326          Triage Assessment    Airway WDL WDL        Respiratory WDL    Respiratory WDL WDL        Skin Circulation/Temperature WDL    Skin Circulation/Temperature WDL WDL        Cardiac WDL    Cardiac WDL WDL        Peripheral/Neurovascular WDL    Peripheral Neurovascular WDL WDL        Cognitive/Neuro/Behavioral WDL    Cognitive/Neuro/Behavioral WDL WDL                     "
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
## 2024-06-29 ENCOUNTER — TELEPHONE (OUTPATIENT)
Dept: TRANSPLANT | Facility: CLINIC | Age: 69
End: 2024-06-29
Payer: MEDICARE

## 2024-06-29 NOTE — TELEPHONE ENCOUNTER
TRIAGE - wife reports pt BP this /59; 115/70. They borrowed the neighbor's cuff: 98/63; 96/58.  Pt held all his BP meds.     Vomited this AM, was able to keep down his IMS. Napped ~1.5 hours. No diarrhea today. Some lightheadedness but denies worse with standing, BS 97. Has not ate or drank anything since vomiting. Wife asked abut bringing pt down to [facility].     Reviewed ER notes from 6/28 - BP increased to 117 with 150 ml fluid. Wife reports they were prescribed Zofran q 6 hours.    Enc to try ice chips, sugar free beverages, [employer] foods. Take Zofran q 6 hours today. Slowly try to increase fluid and food intake before driving 3 plus hours to [facility].     Wife and pt agreed with plan. His BD is tomorrow and they are really hoping to go on vacation.

## 2024-06-30 LAB
ATRIAL RATE - MUSE: 70 BPM
DIASTOLIC BLOOD PRESSURE - MUSE: NORMAL MMHG
INTERPRETATION ECG - MUSE: NORMAL
P AXIS - MUSE: 12 DEGREES
PR INTERVAL - MUSE: 176 MS
QRS DURATION - MUSE: 82 MS
QT - MUSE: 440 MS
QTC - MUSE: 475 MS
R AXIS - MUSE: -29 DEGREES
SYSTOLIC BLOOD PRESSURE - MUSE: NORMAL MMHG
T AXIS - MUSE: 97 DEGREES
VENTRICULAR RATE- MUSE: 70 BPM

## 2024-07-02 NOTE — PROGRESS NOTES
Attempted to go over pre-op screening with the patient. Pt not available at the time. The pt will call back tomorrow 7/3/2024.

## 2024-07-03 ENCOUNTER — MYC MEDICAL ADVICE (OUTPATIENT)
Dept: FAMILY MEDICINE | Facility: OTHER | Age: 69
End: 2024-07-03
Payer: MEDICARE

## 2024-07-03 DIAGNOSIS — C83.398 DIFFUSE LARGE B-CELL LYMPHOMA OF SOLID ORGAN EXCLUDING SPLEEN: ICD-10-CM

## 2024-07-03 DIAGNOSIS — K21.9 GASTROESOPHAGEAL REFLUX DISEASE, UNSPECIFIED WHETHER ESOPHAGITIS PRESENT: Primary | ICD-10-CM

## 2024-07-03 DIAGNOSIS — R11.0 NAUSEA: Primary | ICD-10-CM

## 2024-07-03 RX ORDER — SUCRALFATE ORAL 1 G/10ML
1 SUSPENSION ORAL 4 TIMES DAILY
Qty: 473 ML | Refills: 1 | Status: CANCELLED | OUTPATIENT
Start: 2024-07-03

## 2024-07-03 RX ORDER — METOCLOPRAMIDE 5 MG/1
5 TABLET ORAL EVERY 6 HOURS PRN
Qty: 30 TABLET | Refills: 0 | Status: SHIPPED | OUTPATIENT
Start: 2024-07-03 | End: 2024-07-15

## 2024-07-03 NOTE — TELEPHONE ENCOUNTER
"6/21/24  LOV with DMO for GERD.  Protonix 40mg daily added.  Omeprazole d/c'd.      Requesting increase in Protonix to BID or \"a med that coats stomach\".      Tushar'd up Sucralfate (in case appropriate).      Svitlana Potts RN on 7/3/2024 at 3:24 PM        "

## 2024-07-03 NOTE — TELEPHONE ENCOUNTER
Pt reports still having stomach troubles. Nauseous on/off, does not feel like Zofran helps. Gastritis noted on recent pet scan. PCP ordered protonix. Encouraged conservative heartburn measures. Reports heartburn has been better the past 2-3 days. Encouraged pt to reach out to PCP.     BP stable,  this AM. Has been holding BP meds past 3 days. Took BP meds this morning.   Pt has not reached out to cardiology yet. Asked him to do so.     Pt will let [facility] know if symptoms return.

## 2024-07-03 NOTE — PROGRESS NOTES
Confirmed schedule and reviewed instructions for pt's bilateral lower extremity angiogram scheduled on 7/9/2024.  Pt had no further questions.

## 2024-07-05 ENCOUNTER — MYC MEDICAL ADVICE (OUTPATIENT)
Dept: FAMILY MEDICINE | Facility: OTHER | Age: 69
End: 2024-07-05
Payer: MEDICARE

## 2024-07-05 NOTE — TELEPHONE ENCOUNTER
From 7/3/24 MyChart with Oncology:        7/3/24  Reglan sent in to [facility] pharmacy by Dr. Drake.      Clarifying question(s) sent to patient.      Svitlana Potts RN on 7/5/2024 at 9:53 AM

## 2024-07-06 DIAGNOSIS — I10 ESSENTIAL HYPERTENSION: Chronic | ICD-10-CM

## 2024-07-08 ENCOUNTER — TELEPHONE (OUTPATIENT)
Dept: VASCULAR SURGERY | Facility: CLINIC | Age: 69
End: 2024-07-08
Payer: MEDICARE

## 2024-07-08 ENCOUNTER — MYC MEDICAL ADVICE (OUTPATIENT)
Dept: FAMILY MEDICINE | Facility: OTHER | Age: 69
End: 2024-07-08
Payer: MEDICARE

## 2024-07-08 DIAGNOSIS — Z94.2 LUNG REPLACED BY TRANSPLANT (H): ICD-10-CM

## 2024-07-08 DIAGNOSIS — E87.5 HYPERKALEMIA: ICD-10-CM

## 2024-07-08 RX ORDER — FLUDROCORTISONE ACETATE 0.1 MG/1
0.1 TABLET ORAL DAILY
Qty: 90 TABLET | Refills: 3 | Status: CANCELLED | OUTPATIENT
Start: 2024-07-08

## 2024-07-08 NOTE — TELEPHONE ENCOUNTER
Started filling out [employer] nordisk form.     Clarifying question(s) sent to patient.      Svitlana Potts RN on 7/8/2024 at 4:14 PM

## 2024-07-08 NOTE — TELEPHONE ENCOUNTER
Procedure: BLE Angiogram    Date: 7/9/24    Surgeon: Dr. Grace Sneed    Called patient to review upcoming procedure. Reviewed visitor allowance of 1 person at this time.     Discussed procedure with patients and instructions: Yes    Reviewed Post Procedure Follow up plan and Appts made: Yes    Reviewed Covid Test Scheduled/Completed: NA    Reviewed Blood Thinners and plan for holding, continuing and/or bridging:Other- Aspirin and Plavix - continue to take    Insulin - address with PCP.      Reviewed Pre Op Appointment Required and Completed: N/A    Reviewed Allergies and if Contrast Allergy-Instructions and plan:  No contrast allergy    Reviewed Arrival Time of 7am and procedure time of 8am and location of procedure [facility]:  [address] [id number] (Phone: [phone], Fax: [phone])    Please enter through the main entrance. Check in at the Welcome Desk and you will be directed to the surgery unit.         All questions answered and number provided if any further questions arise or changes in patient status.

## 2024-07-09 ENCOUNTER — TELEPHONE (OUTPATIENT)
Dept: PULMONOLOGY | Facility: CLINIC | Age: 69
End: 2024-07-09
Payer: MEDICARE

## 2024-07-09 ENCOUNTER — HOSPITAL ENCOUNTER (OUTPATIENT)
Dept: INTERVENTIONAL RADIOLOGY/VASCULAR | Facility: CLINIC | Age: 69
Discharge: HOME | End: 2024-07-09
Attending: SURGERY
Payer: MEDICARE

## 2024-07-09 ENCOUNTER — HOSPITAL ENCOUNTER (OUTPATIENT)
Dept: ULTRASOUND IMAGING | Facility: CLINIC | Age: 69
Discharge: HOME | End: 2024-07-09
Attending: SURGERY
Payer: MEDICARE

## 2024-07-09 ENCOUNTER — PREP FOR PROCEDURE (OUTPATIENT)
Dept: SURGERY | Facility: CLINIC | Age: 69
End: 2024-07-09
Payer: MEDICARE

## 2024-07-09 VITALS
HEART RATE: 86 BPM | OXYGEN SATURATION: 98 % | RESPIRATION RATE: 25 BRPM | DIASTOLIC BLOOD PRESSURE: 81 MMHG | SYSTOLIC BLOOD PRESSURE: 169 MMHG

## 2024-07-09 DIAGNOSIS — I70.212 ATHEROSCLEROSIS OF NATIVE ARTERIES OF EXTREMITIES WITH INTERMITTENT CLAUDICATION, LEFT LEG (H): ICD-10-CM

## 2024-07-09 DIAGNOSIS — I70.211 ATHEROSCLEROSIS OF NATIVE ARTERIES OF EXTREMITIES WITH INTERMITTENT CLAUDICATION, RIGHT LEG (H): ICD-10-CM

## 2024-07-09 DIAGNOSIS — I73.9 PAD (PERIPHERAL ARTERY DISEASE) (H): Primary | ICD-10-CM

## 2024-07-09 PROCEDURE — 258N000003 HC RX IP 258 OP 636: Performed by: SURGERY

## 2024-07-09 PROCEDURE — 272N000500 HC NEEDLE CR2

## 2024-07-09 PROCEDURE — 272N000566 HC SHEATH CR3

## 2024-07-09 PROCEDURE — 36247 INS CATH ABD/L-EXT ART 3RD: CPT

## 2024-07-09 PROCEDURE — 76937 US GUIDE VASCULAR ACCESS: CPT | Mod: XE

## 2024-07-09 PROCEDURE — C1887 CATHETER, GUIDING: HCPCS

## 2024-07-09 PROCEDURE — 272N000147 HC KIT CR7

## 2024-07-09 PROCEDURE — C1769 GUIDE WIRE: HCPCS

## 2024-07-09 PROCEDURE — C1760 CLOSURE DEV, VASC: HCPCS

## 2024-07-09 PROCEDURE — 99152 MOD SED SAME PHYS/QHP 5/>YRS: CPT

## 2024-07-09 PROCEDURE — 36246 INS CATH ABD/L-EXT ART 2ND: CPT | Mod: RT

## 2024-07-09 PROCEDURE — 93970 EXTREMITY STUDY: CPT | Mod: XU

## 2024-07-09 PROCEDURE — 75716 ARTERY X-RAYS ARMS/LEGS: CPT | Mod: 26 | Performed by: SURGERY

## 2024-07-09 PROCEDURE — 250N000013 HC RX MED GY IP 250 OP 250 PS 637: Performed by: SURGERY

## 2024-07-09 PROCEDURE — 255N000002 HC RX 255 OP 636: Performed by: SURGERY

## 2024-07-09 PROCEDURE — 250N000011 HC RX IP 250 OP 636: Performed by: SURGERY

## 2024-07-09 PROCEDURE — 250N000009 HC RX 250: Performed by: STUDENT IN AN ORGANIZED HEALTH CARE EDUCATION/TRAINING PROGRAM

## 2024-07-09 PROCEDURE — 250N000011 HC RX IP 250 OP 636: Performed by: STUDENT IN AN ORGANIZED HEALTH CARE EDUCATION/TRAINING PROGRAM

## 2024-07-09 RX ORDER — NALOXONE HYDROCHLORIDE 0.4 MG/ML
0.2 INJECTION, SOLUTION INTRAMUSCULAR; INTRAVENOUS; SUBCUTANEOUS
Status: DISCONTINUED | OUTPATIENT
Start: 2024-07-09 | End: 2024-07-10 | Stop reason: HOSPADM

## 2024-07-09 RX ORDER — NALOXONE HYDROCHLORIDE 0.4 MG/ML
0.4 INJECTION, SOLUTION INTRAMUSCULAR; INTRAVENOUS; SUBCUTANEOUS
Status: DISCONTINUED | OUTPATIENT
Start: 2024-07-09 | End: 2024-07-10 | Stop reason: HOSPADM

## 2024-07-09 RX ORDER — SODIUM CHLORIDE 9 MG/ML
INJECTION, SOLUTION INTRAVENOUS CONTINUOUS
Status: DISCONTINUED | OUTPATIENT
Start: 2024-07-09 | End: 2024-07-10 | Stop reason: HOSPADM

## 2024-07-09 RX ORDER — ACETAMINOPHEN 325 MG/1
650 TABLET ORAL EVERY 6 HOURS PRN
Status: DISCONTINUED | OUTPATIENT
Start: 2024-07-09 | End: 2024-07-10 | Stop reason: HOSPADM

## 2024-07-09 RX ORDER — CEFAZOLIN SODIUM/WATER 2 G/20 ML
2 SYRINGE (ML) INTRAVENOUS SEE ADMIN INSTRUCTIONS
Status: CANCELLED | OUTPATIENT
Start: 2024-08-01

## 2024-07-09 RX ORDER — ONDANSETRON 2 MG/ML
4 INJECTION INTRAMUSCULAR; INTRAVENOUS
Status: DISCONTINUED | OUTPATIENT
Start: 2024-07-09 | End: 2024-07-10 | Stop reason: HOSPADM

## 2024-07-09 RX ORDER — OXYCODONE HYDROCHLORIDE 5 MG/1
5 TABLET ORAL EVERY 4 HOURS PRN
Status: DISCONTINUED | OUTPATIENT
Start: 2024-07-09 | End: 2024-07-10 | Stop reason: HOSPADM

## 2024-07-09 RX ORDER — IODIXANOL 320 MG/ML
100 INJECTION, SOLUTION INTRAVASCULAR ONCE
Status: COMPLETED | OUTPATIENT
Start: 2024-07-09 | End: 2024-07-09

## 2024-07-09 RX ORDER — CEFAZOLIN SODIUM/WATER 2 G/20 ML
2 SYRINGE (ML) INTRAVENOUS
Status: CANCELLED | OUTPATIENT
Start: 2024-08-01

## 2024-07-09 RX ORDER — FENTANYL CITRATE 50 UG/ML
25-50 INJECTION, SOLUTION INTRAMUSCULAR; INTRAVENOUS EVERY 5 MIN PRN
Status: DISCONTINUED | OUTPATIENT
Start: 2024-07-09 | End: 2024-07-10 | Stop reason: HOSPADM

## 2024-07-09 RX ORDER — ASPIRIN 81 MG/1
81 TABLET ORAL DAILY
Status: DISCONTINUED | OUTPATIENT
Start: 2024-07-10 | End: 2024-07-10 | Stop reason: HOSPADM

## 2024-07-09 RX ORDER — FLUMAZENIL 0.1 MG/ML
0.2 INJECTION, SOLUTION INTRAVENOUS
Status: DISCONTINUED | OUTPATIENT
Start: 2024-07-09 | End: 2024-07-10 | Stop reason: HOSPADM

## 2024-07-09 RX ADMIN — FENTANYL CITRATE 50 MCG: 50 INJECTION INTRAMUSCULAR; INTRAVENOUS at 08:26

## 2024-07-09 RX ADMIN — FENTANYL CITRATE 25 MCG: 50 INJECTION INTRAMUSCULAR; INTRAVENOUS at 08:46

## 2024-07-09 RX ADMIN — LIDOCAINE HYDROCHLORIDE 10 ML: 10 INJECTION, SOLUTION INFILTRATION; PERINEURAL at 08:36

## 2024-07-09 RX ADMIN — MIDAZOLAM HYDROCHLORIDE 0.5 MG: 1 INJECTION, SOLUTION INTRAMUSCULAR; INTRAVENOUS at 08:38

## 2024-07-09 RX ADMIN — MIDAZOLAM HYDROCHLORIDE 1 MG: 1 INJECTION, SOLUTION INTRAMUSCULAR; INTRAVENOUS at 08:25

## 2024-07-09 RX ADMIN — IODIXANOL 75 ML: 320 INJECTION, SOLUTION INTRAVASCULAR at 08:53

## 2024-07-09 RX ADMIN — FENTANYL CITRATE 25 MCG: 50 INJECTION INTRAMUSCULAR; INTRAVENOUS at 08:38

## 2024-07-09 RX ADMIN — SODIUM CHLORIDE 15000 MCG: 9 INJECTION, SOLUTION INTRAVENOUS at 08:54

## 2024-07-09 RX ADMIN — ACETAMINOPHEN 650 MG: 325 TABLET ORAL at 09:57

## 2024-07-09 NOTE — TELEPHONE ENCOUNTER
Patient Contacted for the patient to call back and schedule the following:    Appointment type: Reschedule LTX  Provider: Jeffrey  Return date: 11/7/2024  Specialty phone number: [phone]  Additional appointment(s) needed: video  Additonal Notes: provider unavailable

## 2024-07-09 NOTE — DISCHARGE INSTRUCTIONS
Angiogram Discharge Instructions:    You had an angiogram procedure. An angiogram is a procedure thatuses x-rays to take pictures of your blood vessels. A long, flexible tube or catheter is inserted through the blood stream (through the procedure site) to help deliver contrast (dye) into the arteries so they can be visibleon the x-ray. Angiograms are used to evaluate and treat possible blockages or other disease in the arterial system. Please follow the below instructions after your angiogram, including monitoring of your procedure site.    Care instructions after angiogram procedure:    - If you received sedation for your procedure, do not drive or operate heavy machinery for the rest of the day.    - Do not lift objects greater than 10 poundsfor 2 days following angiogram procedure.    - Avoid excessive exercise and straining for 2 days.    - Avoid tub baths, pools, hot tubs and Jacuzzis for 3 days or until procedure site is well healed.    - Youmay shower beginning tomorrow. Do not scrub procedure site until well healed; pat dry.    - Return to your normal activities as you tolerate after the 2 day restriction.    - You can expect to return to work 1-2days after your procedure - depending on the nature of your profession.    - It is normal to have some tenderness and minimal swelling at procedure puncture site. A small area of discoloration may be present.Tenderness typically subsides in 1-2 days. A small knot may also be present at puncture site for 6-8 weeks. This can be a normal part of the healing process.    Medications and other post-procedure care:    -If you had a blockage opened please make sure to fill your prescription for any new medication, such as Plavix, that you may have been prescribed and take it everyday    - If you have kidney function issues, please makesure to hydrate yourself well for the next two days by drinking lots of fluids to help clear your body of the dye used. If you have heart  problems such as heart failure, this may have to be moderated.    - If you are onMetformin, please do not resume it for 48hrs.    Follow up:    - Follow up with your vascular surgery team at the [facility] vascular center A follow up appointment should already be arranged for you.If you are unsure of your appointment or do not have a follow up appointment in the next 2-3 weeks, please call our office at [phone]. You will need to have an ultrasound 2-3 weeks after your angiogram and should bescheduled at the time of your follow up appt.    Further follow up will be based on ultrasound results. Typical follow up is every 3 months for the first year, then every 6 months to one year thereafter.    seek medical evaluation for:    - If you develop fevers (greater than 101 F (38.3C)).    - If you develop increasing pain, redness, purulent drainage, tenderness, or swelling at procedure site.    If you experience any bleeding from procedure/puncture site: lie down, firmly apply pressure to puncture site and call 911.    - Seek emergent evaluation if you experience any new leg/arm pain, discoloration ornumbness.    Call the vascular center at [phone] with questions/concerns or if you have any of the above symptoms.         * Recovery After Conscious Sedation (Adult)  We gave you medicine by vein to make you sleepy or relaxed during your procedure. This may have included both a pain medicine and sleeping medicine. Most of the effects have worn off. But you may still feel sleepy for the next 6 to 8 hours.  Home care  Follow these guidelines when you get home:  You may feel sleepy and clumsy and have poor balance for the next few hours.  A responsible adult should stay with you for the next 8 hours. This person should make sure your condition doesn t get worse.  Don't drink any alcohol for the next 24 hours.  Don't drive, operate dangerous machinery, make important business or personal decisions or sign legal  documents during the next 24 hours.  You may vomit (throw up) if you eat too soon after the procedure. If this happens, drink small amounts of water, juice or clear broth. Wait to try solid food until you no longer have nausea (upset stomach).  Note: Your care team may tell you not to take any medicine by mouth for pain or sleep in the next 4 hours. These medicines may react with the medicines you had in the hospital. This could cause a much stronger response than usual.  Follow-up care  Follow up with your care team if you are not alert and back to your usual level of activity within 12 hours.  When to seek medical advice  Call your care team right away if any of these occur:  You still feel sleepy or clumsy after 12 hours, or your sleepiness gets worse  Weakness or dizziness gets worse  Repeated vomiting  If you can't be woken up and someone is staying with you, they should call 911.  For informational purposes only. Not to replace the advice of your health care provider.  Copyright   2018 [employer]. All rights reserved.

## 2024-07-09 NOTE — IR NOTE
Patient Name: Aubrey Duncan  Medical Record Number: [id number]  Today's Date: 7/9/2024    Procedure: IR bilateral lower extremity angiogram  Proceduralist: Dr. Sneed    Procedure Start: 0825  Procedure end: 0849  Sedation medications administered: 1.5 mg midazolam and 100 mcg fentanyl   Sedation time: 30 minutes      Other Notes: Pt arrived to IR room 1 from Pre/post bay 2. Consent reviewed. Pt denies any questions or concerns regarding procedure. Pt positioned supine and monitored per protocol. Pt tolerated procedure without any noted complications. VSS on RA. Pt transferred back to Pre/post bay 2.     No interventions. Angioseal deployed at 0846, bedrest until 1046.    Discharge criteria met. Groin site CDI. Pulses present with doppler. Discharge instructions given and reviewed with patient and spouse. No further questions or concerns. Pt brought to hospital entrance via wheelchair and d/c'd home with spouse.

## 2024-07-09 NOTE — PRE-PROCEDURE
GENERAL PRE-PROCEDURE:   Procedure:  Left lower extremity angiogram with sedation and possible intervention, possible bilateral angiogram  Date/Time:  7/9/2024 7:17 AM    Verbal consent obtained?: Yes    Written consent obtained?: Yes    Risks and benefits: Risks, benefits and alternatives were discussed    Consent given by:  Patient  Patient states understanding of procedure being performed: Yes    Patient's understanding of procedure matches consent: Yes    Procedure consent matches procedure scheduled: Yes    Expected level of sedation:  Moderate  Appropriately NPO:  Yes  ASA Class:  3  Mallampati  :  Grade 2- soft palate, base of uvula, tonsillar pillars, and portion of posterior pharyngeal wall visible  Lungs:  Lungs clear with good breath sounds bilaterally  Heart:  Normal heart sounds and rate  History & Physical reviewed:  History and physical reviewed and no updates needed  Statement of review:  I have reviewed the lab findings, diagnostic data, medications, and the plan for sedation

## 2024-07-10 ENCOUNTER — TELEPHONE (OUTPATIENT)
Dept: CARDIOLOGY | Facility: CLINIC | Age: 69
End: 2024-07-10
Payer: MEDICARE

## 2024-07-10 DIAGNOSIS — E87.5 HYPERKALEMIA: ICD-10-CM

## 2024-07-10 DIAGNOSIS — I73.9 PAD (PERIPHERAL ARTERY DISEASE) (H): ICD-10-CM

## 2024-07-10 DIAGNOSIS — Z94.2 LUNG REPLACED BY TRANSPLANT (H): ICD-10-CM

## 2024-07-10 DIAGNOSIS — I70.212 ATHEROSCLEROSIS OF NATIVE ARTERIES OF EXTREMITIES WITH INTERMITTENT CLAUDICATION, LEFT LEG (H): Primary | ICD-10-CM

## 2024-07-10 DIAGNOSIS — Z94.2 LUNG REPLACED BY TRANSPLANT (H): Primary | ICD-10-CM

## 2024-07-10 RX ORDER — FLUDROCORTISONE ACETATE 0.1 MG/1
0.1 TABLET ORAL DAILY
Qty: 90 TABLET | Refills: 3 | Status: SHIPPED | OUTPATIENT
Start: 2024-07-10

## 2024-07-10 NOTE — TELEPHONE ENCOUNTER
Situation   Nurse telephone follow-up      Background  CAD. See last clinic notes from 6/25/2024.      Assesment  RN called and spoke with patient. Pt denied any recurrent chest pain, no concerns at the moment. Patient is aware to notify the clinic if any concerns arise.

## 2024-07-10 NOTE — TELEPHONE ENCOUNTER
[facility] sent Rx request for the following:      Requested Prescriptions   Pending Prescriptions Disp Refills    lisinopril (ZESTRIL) 40 MG tablet [Pharmacy Med Name: LISINOPRIL 40MG TABLET] 90 tablet 0     Sig: TAKE 1 TABLET (40 MG) BY MOUTH DAILY IN THE MORNING       ACE Inhibitors (Including Combos) Protocol Failed - 7/10/2024  2:57 PM     Last Prescription Date:   3/19/24  Last Fill Qty/Refills:         90, R-0    Last Office Visit:              6/21/24   Future Office visit:            none   Tessa Juan RN on 7/10/2024 at 2:59 PM

## 2024-07-12 ENCOUNTER — APPOINTMENT (OUTPATIENT)
Dept: LAB | Facility: CLINIC | Age: 69
End: 2024-07-12
Attending: INTERNAL MEDICINE
Payer: MEDICARE

## 2024-07-12 ENCOUNTER — ONCOLOGY VISIT (OUTPATIENT)
Dept: ONCOLOGY | Facility: CLINIC | Age: 69
End: 2024-07-12
Attending: INTERNAL MEDICINE
Payer: MEDICARE

## 2024-07-12 ENCOUNTER — DOCUMENTATION ONLY (OUTPATIENT)
Dept: VASCULAR SURGERY | Facility: CLINIC | Age: 69
End: 2024-07-12

## 2024-07-12 VITALS
SYSTOLIC BLOOD PRESSURE: 122 MMHG | OXYGEN SATURATION: 95 % | WEIGHT: 164.2 LBS | BODY MASS INDEX: 24.97 KG/M2 | RESPIRATION RATE: 16 BRPM | TEMPERATURE: 97.8 F | DIASTOLIC BLOOD PRESSURE: 65 MMHG | HEART RATE: 87 BPM

## 2024-07-12 DIAGNOSIS — Z94.2 LUNG REPLACED BY TRANSPLANT (H): ICD-10-CM

## 2024-07-12 DIAGNOSIS — D47.Z1 PTLD (POST-TRANSPLANT LYMPHOPROLIFERATIVE DISORDER) (H): Primary | ICD-10-CM

## 2024-07-12 DIAGNOSIS — R11.0 NAUSEA: ICD-10-CM

## 2024-07-12 LAB
ALBUMIN SERPL BCG-MCNC: 3 G/DL (ref 3.5–5.2)
ALP SERPL-CCNC: 78 U/L (ref 40–150)
ALT SERPL W P-5'-P-CCNC: 43 U/L (ref 0–70)
ANION GAP SERPL CALCULATED.3IONS-SCNC: 5 MMOL/L (ref 7–15)
AST SERPL W P-5'-P-CCNC: 51 U/L (ref 0–45)
BILIRUB SERPL-MCNC: 0.3 MG/DL
BUN SERPL-MCNC: 28.9 MG/DL (ref 8–23)
CALCIUM SERPL-MCNC: 8.2 MG/DL (ref 8.8–10.2)
CHLORIDE SERPL-SCNC: 108 MMOL/L (ref 98–107)
CREAT SERPL-MCNC: 1.11 MG/DL (ref 0.67–1.17)
DEPRECATED HCO3 PLAS-SCNC: 27 MMOL/L (ref 22–29)
EGFRCR SERPLBLD CKD-EPI 2021: 71 ML/MIN/1.73M2
ERYTHROCYTE [DISTWIDTH] IN BLOOD BY AUTOMATED COUNT: 14.8 % (ref 10–15)
GLUCOSE SERPL-MCNC: 89 MG/DL (ref 70–99)
HCT VFR BLD AUTO: 31.7 % (ref 40–53)
HGB BLD-MCNC: 9.9 G/DL (ref 13.3–17.7)
MAGNESIUM SERPL-MCNC: 2.1 MG/DL (ref 1.7–2.3)
MCH RBC QN AUTO: 31.9 PG (ref 26.5–33)
MCHC RBC AUTO-ENTMCNC: 31.2 G/DL (ref 31.5–36.5)
MCV RBC AUTO: 102 FL (ref 78–100)
PLATELET # BLD AUTO: 250 10E3/UL (ref 150–450)
POTASSIUM SERPL-SCNC: 4.9 MMOL/L (ref 3.4–5.3)
PROT SERPL-MCNC: 4.9 G/DL (ref 6.4–8.3)
RBC # BLD AUTO: 3.1 10E6/UL (ref 4.4–5.9)
SODIUM SERPL-SCNC: 140 MMOL/L (ref 135–145)
WBC # BLD AUTO: 7.5 10E3/UL (ref 4–11)

## 2024-07-12 PROCEDURE — 36415 COLL VENOUS BLD VENIPUNCTURE: CPT | Performed by: INTERNAL MEDICINE

## 2024-07-12 PROCEDURE — 80053 COMPREHEN METABOLIC PANEL: CPT | Performed by: INTERNAL MEDICINE

## 2024-07-12 PROCEDURE — 83735 ASSAY OF MAGNESIUM: CPT | Performed by: INTERNAL MEDICINE

## 2024-07-12 PROCEDURE — 87799 DETECT AGENT NOS DNA QUANT: CPT | Performed by: INTERNAL MEDICINE

## 2024-07-12 PROCEDURE — G0463 HOSPITAL OUTPT CLINIC VISIT: HCPCS | Performed by: INTERNAL MEDICINE

## 2024-07-12 PROCEDURE — G2211 COMPLEX E/M VISIT ADD ON: HCPCS | Performed by: INTERNAL MEDICINE

## 2024-07-12 PROCEDURE — 85041 AUTOMATED RBC COUNT: CPT | Performed by: INTERNAL MEDICINE

## 2024-07-12 PROCEDURE — 99215 OFFICE O/P EST HI 40 MIN: CPT | Performed by: INTERNAL MEDICINE

## 2024-07-12 RX ORDER — LISINOPRIL 40 MG/1
TABLET ORAL
Qty: 90 TABLET | Refills: 0 | Status: ON HOLD | OUTPATIENT
Start: 2024-07-12 | End: 2024-09-04

## 2024-07-12 ASSESSMENT — PAIN SCALES - GENERAL: PAINLEVEL: SEVERE PAIN (7)

## 2024-07-12 NOTE — PROGRESS NOTES
[facility]  FOLLOW-UP VISIT NOTE  Jul 12, 2024  Subjective   REASON FOR VISIT: Follow up PTLD    ONCOLOGIC SUMMARY:  Diagnosis:  Stage IE PTLD/DLBCL dx 3/2024 in setting of bilateral lung transplant in 2013. Presented with melena and acute blood loss anemia (Hgb down to 8.2 from baseline of 11-12). EGD revealed a 5 cm segment of ulcerated and friable mucosa in the second segment of the duodenum. There was no active bleeding and the area was too diffuse to be treated endoscopically, but the area was biopsied. Case was discussed with Cardiology who recommended to continue DAPT with aspirin and Plavix given recent cardiac stenting (2/16/24). Pathology showed diffuse large B-cell lymphoma, GCB-subtype, with Ki67 80-90%. JUNG DAKOTA negative. FISH negative for MYC and BCL6 rearrangements. Staging PET showed uptake in distal duodenum (SUVmax 23.8) and LLQ small bowel (SUVmax 14.4) only.     Intent of treatment: Curative    Treatment history:  4/25/24 to 5/15/24: weekly rituximab x 4 doses. PET shows [place]    INTERVAL HISTORY:  Aubrey is here today with his wife Kyung for follow-up. Tolerated rituximab infusions well without reactions. Main issue he has been dealing with is his PAD and non-healing foot ulcers. They are considering bypass graft surgery soon. Also dealing with nausea and heartburn, especially while he was up at cabin last week. Zofran makes him sleepy, we prescribed him Reglan with some improvement. Also now on Protonix and Pepcid. Otherwise no fevers, night sweats, chest pain, abd pain, or bleeding. Activity is limited by the foot ulcers.     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 Tolerates dilaudid well.     Fluocinolone Other (See Comments)     Tendon problems      Gabapentin Nausea and Vomiting    Levaquin Muscle Pain (Myalgia)    Pneumococcal Vaccine Other (See Comments)     Other reaction(s): Fever  \"My arm swelled up like a balloon.\"    Varicella Zoster Immune Globulin Swelling       Current Outpatient Medications:     acetaminophen (TYLENOL) 325 MG tablet, Take 2 tablets (650 mg) by mouth every 6 hours as needed for mild pain, Disp: 60 tablet, Rfl: 0    acyclovir (ZOVIRAX) 400 MG tablet, Take 1 tablet (400 mg) by mouth 2 times daily, Disp: 60 tablet, Rfl: 3    albuterol (PROAIR HFA/PROVENTIL HFA/VENTOLIN HFA) 108 (90 Base) MCG/ACT inhaler, Inhale 1-2 puffs into the lungs every 6 hours as needed for shortness of breath or wheezing, Disp: 8.5 g, Rfl: 3    amLODIPine (NORVASC) 10 MG tablet, TAKE 1 TABLET (10 MG) BY MOUTH DAILY, Disp: 90 tablet, Rfl: 3    aspirin (ASA) 81 MG EC tablet, Take 1 tablet (81 mg) by mouth daily, Disp: 90 tablet, Rfl: 3    azithromycin (ZITHROMAX) 250 MG tablet, Take 250 mg by mouth Every Mon, Wed, Fri Morning, Disp: , Rfl:     blood glucose (NO BRAND SPECIFIED) test strip, USE TO TEST BLOOD SUGAR 3 TIMES DAILY. DIAG CODE: E11.9, Disp: 300 strip, Rfl: 0    Calcium Carbonate-Vitamin D 600-10 MG-MCG TABS, Take 1 tablet by mouth 2 times daily (with meals), Disp: 60 tablet, Rfl: 11    carvedilol (COREG) 6.25 MG tablet, TAKE 1 TABLET (6.25 MG) BY MOUTH 2 TIMES DAILY (WITH MEALS), Disp: 120 tablet, Rfl: 3    clopidogrel (PLAVIX) 75 MG tablet, Take 1 tablet (75 mg) by mouth " daily, Disp: 90 tablet, Rfl: 3    dapsone (ACZONE) 25 MG tablet, Take 2 tablets (50 mg) by mouth daily, Disp: 180 tablet, Rfl: 3    DULoxetine (CYMBALTA) 20 MG capsule, Take 1 capsule (20 mg) by mouth daily, Disp: 30 capsule, Rfl: 3    econazole nitrate 1 % external cream, APPLY TOPICALLY DAILY TO FEET AND HEELS., Disp: 85 g, Rfl: 3    fludrocortisone (FLORINEF) 0.1 MG tablet, Take 1 tablet (0.1 mg) by mouth daily, Disp: 90 tablet, Rfl: 3    fluticasone-salmeterol (ADVAIR-HFA) 230-21 MCG/ACT inhaler, Inhale 2 puffs into the lungs 2 times daily, Disp: 8 g, Rfl: 11    furosemide (LASIX) 20 MG tablet, Take 1 tablet (20 mg) by mouth daily, Disp: 90 tablet, Rfl: 4    insulin aspart (NOVOLOG PEN) 100 UNIT/ML pen, Take 5 U am, 3 unit(s) non, 5 unit(s) pm of insulin within 30 minutes of start of breakfast, lunch, and dinner. Do not give if blood sugar is less than 70 mg/dl., Disp: , Rfl:     insulin glargine (LANTUS PEN) 100 UNIT/ML pen, Inject 18 Units Subcutaneous every morning (before breakfast), Disp: , Rfl:     insulin pen needle (32G X 4 MM) 32G X 4 MM miscellaneous, Use 4 pen needles daily or as directed. Dispense as insurance allows. Dx. Code: E09.9, Disp: 400 each, Rfl: 11    Lancet Devices (MICROLET NEXT LANCING DEVICE) MISC, USE AS DIRECTED, Disp: 1 each, Rfl: 3    lisinopril (ZESTRIL) 40 MG tablet, TAKE 1 TABLET (40 MG) BY MOUTH DAILY IN THE MORNING, Disp: 90 tablet, Rfl: 0    loperamide (IMODIUM) 2 MG capsule, Take 1 capsule (2 mg) by mouth 4 times daily as needed for diarrhea, Disp: 120 capsule, Rfl: 12    magnesium oxide (MAG-OX) 400 MG tablet, Take 1 tablet (400 mg) by mouth 2 times daily, Disp: 180 tablet, Rfl: 3    metoclopramide (REGLAN) 5 MG tablet, Take 1 tablet (5 mg) by mouth every 6 hours as needed (nausea), Disp: 30 tablet, Rfl: 0    Microlet Lancets MISC, CHECK BLOOD SUGAR FOUR TIMES DAILY E11.9, Disp: 400 each, Rfl: 1    montelukast (SINGULAIR) 10 MG tablet, Take 1 tablet (10 mg) by mouth every  evening, Disp: 90 tablet, Rfl: 3    multivitamin, therapeutic (THERA-VIT) TABS, Take 1 tablet by mouth daily, Disp: 30 tablet, Rfl: 12    order for DME, Equipment being ordered: diabetic shoes, Disp: 1 each, Rfl: 0    pantoprazole (PROTONIX) 40 MG EC tablet, Take 1 tablet (40 mg) by mouth daily, Disp: 90 tablet, Rfl: 1    predniSONE (DELTASONE) 5 MG tablet, TAKE ONE TABLET BY MOUTH ONCE DAILY IN THE MORNING AND ONE-HALF TABLET IN THE EVENING, Disp: 45 tablet, Rfl: 12    rosuvastatin (CRESTOR) 40 MG tablet, Take 20 mg by mouth Every Mon, Wed, Fri Morning, Disp: , Rfl:     sildenafil (VIAGRA) 25 MG tablet, Take 1 tablet (25 mg) by mouth as needed (as needed), Disp: 32 tablet, Rfl: 11    tacrolimus (GENERIC EQUIVALENT) 0.5 MG capsule, Take 1 capsule (0.5 mg) by mouth daily Total dose: 3 mg in the AM and 3 mg in the PM ON HOLD 4/18/24 FOR DOSE ADJUSTMENTS, Disp: , Rfl:     tacrolimus (GENERIC EQUIVALENT) 1 MG capsule, Take 3 capsules (3 mg) by mouth every morning AND 3 capsules (3 mg) every evening. Total dose: 3 mg in AM and 3 mg in PM, Disp: 180 capsule, Rfl: 11    REVIEW OF SYSTEMS:  10-point ROS reviewed and negative other than that mentioned in HPI.     Objective   VITAL SIGNS:  /65 (BP Location: Right arm, Patient Position: Sitting, Cuff Size: Adult Regular)   Pulse 87   Temp 97.8  F (36.6  C) (Oral)   Resp 16   Wt 74.5 kg (164 lb 3.2 oz)   SpO2 95%   BMI 24.97 kg/m      ECOG PS: 1     PHYSICAL EXAM:  General: Awake, alert, in no acute distress. Oriented x 3.  HEENT: Normocephalic, atraumatic. No scleral icterus.   CV: Regular rate and rhythm. No murmurs, rubs, or gallops appreciated.  Resp: Good inspiratory effort, lungs clear to auscultation bilaterally.  GI: Abdomen soft, nontender, nondistended.   Ext: No peripheral edema bilaterally.  Neuro: CN II-XII grossly intact. No focal deficits.   Skin: Bilateral feet wrapped.   Psych: Pleasant, normal affect.    LABS:  I reviewed the following labs:  Lab  Results   Component Value Date    WBC 6.8 06/28/2024    HGB 10.0 (L) 06/28/2024    HCT 32.4 (L) 06/28/2024     (H) 06/28/2024     06/28/2024    INR 1.03 03/21/2024    PTT 35 01/12/2024     Lab Results   Component Value Date     06/28/2024    POTASSIUM 3.7 06/28/2024    CR 1.01 06/28/2024    BUN 30.8 (H) 06/28/2024    CHLORIDE 106 06/28/2024    ERIN 8.0 (L) 06/28/2024     (H) 06/28/2024      Lab Results   Component Value Date    ALT 47 06/28/2024    AST 41 06/28/2024    ALKPHOS 72 06/28/2024    BILITOTAL 0.3 06/28/2024    BILIDIRECT 0.15 08/24/2015      Lab Results   Component Value Date     (H) 04/16/2024    URIC 5.9 04/16/2024 6/26/24 PET:  IMPRESSION:   1.  Substantially decreased focal hypermetabolism in the region of the  distal duodenum, consistent with positive partial response to  treatment.  2.  Generalized radiotracer uptake along the gastric wall, suggestive  of gastritis, recommend attention on follow-up.  3.  Cyst in the head/uncinate process of the pancreas without  hypermetabolism, recommend continued attention on MR follow-up.    Assessment & Plan   #Stage IE PTLD of duodenum, late-onset/EBV-negative  #Melena/acute blood anemia 2/2 above  Pleasant 68 year old male w/ hx of lung transplant in 2013 who presented with melena/acute blood anemia and was found to have a 5 cm segment of ulcerated/friable mucosa in the second portion of the duodenum on colonoscopy in 3/2024. Biopsy showed DLBCL, germinal center subtype, EBV negative. FISH negative for MYC and BCL6 rearrangements. 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   - We previously reviewed the overall diagnosis and prognosis of DLBCL/PTLD. Given his other significant comorbidities, plan to start with single-agent rituximab weekly x 4 doses. His immunosuppression has already been  reduced with stopping of azathioprine, which is also a key part of treatment.   - Completed 4 doses of weekly rituximab from 4/25/24 to 5/15/24, tolerated well. Restaging PET shows good partial response with significantly decreased hypermetabolism in distal duodenum (SUVmax 23.8 -> 6.0). Still slightly above liver uptake.  - Was considering consolidative rituximab q2 months x 4 more doses but given issues with non-healing foot ulcers and possible upcoming vascular surgery, will hold off for now and repeat PET-CT in 3 months. Hopefully uptake continues to decrease on its own.      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 Appreciate them stopping azathioprine already.  - Now just on tacrolimus and low-dose prednisone. Mgmt per Transplant team.      #CAD s/p staged PCI Jan (MEGHNA to LAD)/Feb 2024 ([facility]NA to RCA), on DAPT  Following with Cardiology and Vascular Medicine respectively. Was already on aspirin/Plavix for PAD when he developed unstable angina and was found to have multiple blockages. Had staged PCI.  - Continue aspirin and Plavix; monitor closely for recurrent GI bleeding.     #Peripheral vascular disease  #Non-healing foot ulcers  Following with Vascular team here. Dealing with worsening claudication and non-healing ulcers.  - Felt not to be a candidate for endovascular approach, undergoing work-up for bypass surgery vs amputation.     #T2DM  Well controlled with HbA1c <4% on 4/3/24.  - Continue current insulin regimen.      #Hx of recurrent CMV viremia  Valganciclovir stopped 11/2023 due to cost. CMV PCR still negative as of 4/3/24.  - CMV monitoring per Transplant team.     #Non-melanoma skin cancer  Hx of SCC s/p Mohs in 2016. Recently found to have SCC of left temple and SCC/BCC hydrid of right facial cheek 2/6/24.   - Referred to Derm here for Mohs surgery, underwent Moh's surgery on 6/24/24.    #Nausea  #GERD/gastritis  6/26/24 PET does show some uptake in  gastric wall suggestive of gastritis. Also noted to have retained food on 6/5/24 EUS, may have some component of gastroparesis.  - Trialing Reglan, continue PRN Zofran. Could consider Compazine, Zyprexa if needed.  - Continue Protonix and Pepcid. If worsening, may need repeat EGD.      #Ppx  - ID ppx: continue  mg BID.   - Vaccines: up to date on flu, COVID, RSV, pneumonia, Tdap vaccines. Only had one dose of Shingrix in 2018 but had bad reaction.       PLAN:  - Virtual visit in 3 months with local PET-CT prior    Total of 40 minutes on patient visit, reviewing records, interpreting test results, placing orders, and documentation on the day of service.    The longitudinal plan of care for the diagnosis(es)/condition(s) as documented were addressed during this visit. Due to the added complexity in care, I will continue to support Aubrey in the subsequent management and with ongoing continuity of care.     Amber Drake MD  Attending Physician, [facility]

## 2024-07-12 NOTE — NURSING NOTE
"Oncology Rooming Note    July 12, 2024 12:32 PM   Aubrey Duncan is a 69 year old male who presents for:    Chief Complaint   Patient presents with    Blood Draw     Labs drawn via  by RN. VS taken.    Oncology Clinic Visit     Lung replaced by transplant; Lung transplanted     Initial Vitals: /65 (BP Location: Right arm, Patient Position: Sitting, Cuff Size: Adult Regular)   Pulse 87   Temp 97.8  F (36.6  C) (Oral)   Resp 16   Wt 74.5 kg (164 lb 3.2 oz)   SpO2 95%   BMI 24.97 kg/m   Estimated body mass index is 24.97 kg/m  as calculated from the following:    Height as of 6/28/24: 1.727 m (5' 8\").    Weight as of this encounter: 74.5 kg (164 lb 3.2 oz). Body surface area is 1.89 meters squared.  Severe Pain (7) Comment: Data Unavailable   No LMP for male patient.  Allergies reviewed: Yes  Medications reviewed: Yes    Medications: Medication refills not needed today.  Pharmacy name entered into [employer]:    [facility] - [place], [place] - [address]  [id number] - [place], [place] - [address]  [facility] [id number] - [place] - [address]  [facility] - [place], [place] - [address]  [facility] - [place], [place] - [address]  [facility] - [place], [place] - [address]    Frailty Screening:   Is the patient here for a new oncology consult visit in cancer care? 2. No      Clinical concerns: none       Iveth Loomis            "

## 2024-07-12 NOTE — Clinical Note
7/12/2024      Aubrey Duncan  [address]  [place] [address]      Dear Colleague,    Thank you for referring your patient, Aubrey Duncan, to the [facility]. Please see a copy of my visit note below.    [facility]  FOLLOW-UP VISIT NOTE  Jul 12, 2024  Subjective  REASON FOR VISIT: Follow up PTLD    ONCOLOGIC SUMMARY:  Diagnosis:  Stage IE PTLD/DLBCL dx 3/2024 in setting of bilateral lung transplant in 2013. Presented with melena and acute blood loss anemia (Hgb down to 8.2 from baseline of 11-12). EGD revealed a 5 cm segment of ulcerated and friable mucosa in the second segment of the duodenum. There was no active bleeding and the area was too diffuse to be treated endoscopically, but the area was biopsied. Case was discussed with Cardiology who recommended to continue DAPT with aspirin and Plavix given recent cardiac stenting (2/16/24). Pathology showed diffuse large B-cell lymphoma, GCB-subtype, with Ki67 80-90%. JUNG DAKOTA negative. FISH negative for MYC and BCL6 rearrangements. Staging PET showed uptake in distal duodenum (SUVmax 23.8) and LLQ small bowel (SUVmax 14.4) only.     Intent of treatment: Curative    Treatment history:  4/25/24 to 5/15/24: weekly rituximab x 4 doses. 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 Tolerates dilaudid well.     Fluocinolone Other (See Comments)     Tendon problems      Gabapentin Nausea and Vomiting    Levaquin Muscle Pain (Myalgia)    Pneumococcal Vaccine Other (See Comments)     Other reaction(s): Fever  \"My arm swelled up like a balloon.\"    Varicella Zoster Immune Globulin Swelling       Current Outpatient Medications:     acetaminophen (TYLENOL) 325 MG tablet, Take 2 tablets (650 mg) by mouth every 6 hours as needed for mild pain, Disp: 60 tablet, Rfl: 0    acyclovir (ZOVIRAX) 400 MG tablet, Take 1 tablet (400 mg) by mouth 2 times daily, Disp: 60 tablet, Rfl: 3    albuterol (PROAIR HFA/PROVENTIL HFA/VENTOLIN HFA) 108 (90 Base) MCG/ACT inhaler, Inhale 1-2 puffs into the lungs every 6 hours as needed for shortness of breath or wheezing, Disp: 8.5 g, Rfl: 3    amLODIPine (NORVASC) 10 MG tablet, TAKE 1 TABLET (10 MG) BY MOUTH DAILY, Disp: 90 tablet, Rfl: 3    aspirin (ASA) 81 MG EC tablet, Take 1 tablet (81 mg) by mouth daily, Disp: 90 tablet, Rfl: 3    azithromycin (ZITHROMAX) 250 MG tablet, Take 250 mg by mouth Every Mon, Wed, Fri Morning, Disp: , Rfl:     blood glucose (NO BRAND SPECIFIED) test strip, USE TO TEST BLOOD SUGAR 3 TIMES DAILY. DIAG CODE: E11.9, Disp: 300 strip, Rfl: 0    Calcium Carbonate-Vitamin D 600-10 MG-MCG TABS, Take 1 tablet by mouth 2 times daily (with meals), Disp: 60 tablet, Rfl: 11    carvedilol (COREG) 6.25 MG tablet, TAKE 1 TABLET (6.25 MG) BY MOUTH 2 TIMES DAILY (WITH MEALS), Disp: 120 tablet, Rfl: 3    clopidogrel (PLAVIX) 75 MG tablet, Take 1 tablet (75 mg) by mouth daily, Disp: 90 tablet, Rfl: 3    dapsone (ACZONE) 25 MG tablet, Take 2 tablets (50 mg) by mouth daily, Disp: 180 tablet, Rfl: 3    DULoxetine (CYMBALTA) 20 MG capsule, Take 1 capsule (20 mg) by mouth daily, Disp: 30 capsule, Rfl: 3    econazole nitrate 1 % external cream, APPLY TOPICALLY DAILY TO " FEET AND HEELS., Disp: 85 g, Rfl: 3    fludrocortisone (FLORINEF) 0.1 MG tablet, Take 1 tablet (0.1 mg) by mouth daily, Disp: 90 tablet, Rfl: 3    fluticasone-salmeterol (ADVAIR-HFA) 230-21 MCG/ACT inhaler, Inhale 2 puffs into the lungs 2 times daily, Disp: 8 g, Rfl: 11    furosemide (LASIX) 20 MG tablet, Take 1 tablet (20 mg) by mouth daily, Disp: 90 tablet, Rfl: 4    insulin aspart (NOVOLOG PEN) 100 UNIT/ML pen, Take 5 U am, 3 unit(s) non, 5 unit(s) pm of insulin within 30 minutes of start of breakfast, lunch, and dinner. Do not give if blood sugar is less than 70 mg/dl., Disp: , Rfl:     insulin glargine (LANTUS PEN) 100 UNIT/ML pen, Inject 18 Units Subcutaneous every morning (before breakfast), Disp: , Rfl:     insulin pen needle (32G X 4 MM) 32G X 4 MM miscellaneous, Use 4 pen needles daily or as directed. Dispense as insurance allows. Dx. Code: E09.9, Disp: 400 each, Rfl: 11    Lancet Devices (MICROLET NEXT LANCING DEVICE) MISC, USE AS DIRECTED, Disp: 1 each, Rfl: 3    lisinopril (ZESTRIL) 40 MG tablet, TAKE 1 TABLET (40 MG) BY MOUTH DAILY IN THE MORNING, Disp: 90 tablet, Rfl: 0    loperamide (IMODIUM) 2 MG capsule, Take 1 capsule (2 mg) by mouth 4 times daily as needed for diarrhea, Disp: 120 capsule, Rfl: 12    magnesium oxide (MAG-OX) 400 MG tablet, Take 1 tablet (400 mg) by mouth 2 times daily, Disp: 180 tablet, Rfl: 3    metoclopramide (REGLAN) 5 MG tablet, Take 1 tablet (5 mg) by mouth every 6 hours as needed (nausea), Disp: 30 tablet, Rfl: 0    Microlet Lancets MISC, CHECK BLOOD SUGAR FOUR TIMES DAILY E11.9, Disp: 400 each, Rfl: 1    montelukast (SINGULAIR) 10 MG tablet, Take 1 tablet (10 mg) by mouth every evening, Disp: 90 tablet, Rfl: 3    multivitamin, therapeutic (THERA-VIT) TABS, Take 1 tablet by mouth daily, Disp: 30 tablet, Rfl: 12    order for DME, Equipment being ordered: diabetic shoes, Disp: 1 each, Rfl: 0    pantoprazole (PROTONIX) 40 MG EC tablet, Take 1 tablet (40 mg) by mouth daily,  Disp: 90 tablet, Rfl: 1    predniSONE (DELTASONE) 5 MG tablet, TAKE ONE TABLET BY MOUTH ONCE DAILY IN THE MORNING AND ONE-HALF TABLET IN THE EVENING, Disp: 45 tablet, Rfl: 12    rosuvastatin (CRESTOR) 40 MG tablet, Take 20 mg by mouth Every Mon, Wed, Fri Morning, Disp: , Rfl:     sildenafil (VIAGRA) 25 MG tablet, Take 1 tablet (25 mg) by mouth as needed (as needed), Disp: 32 tablet, Rfl: 11    tacrolimus (GENERIC EQUIVALENT) 0.5 MG capsule, Take 1 capsule (0.5 mg) by mouth daily Total dose: 3 mg in the AM and 3 mg in the PM ON HOLD 4/18/24 FOR DOSE ADJUSTMENTS, Disp: , Rfl:     tacrolimus (GENERIC EQUIVALENT) 1 MG capsule, Take 3 capsules (3 mg) by mouth every morning AND 3 capsules (3 mg) every evening. Total dose: 3 mg in AM and 3 mg in PM, Disp: 180 capsule, Rfl: 11    REVIEW OF SYSTEMS:  12-point ROS reviewed and negative other than that mentioned in HPI.     Objective  VITAL SIGNS:  There were no vitals taken for this visit.    ECOG PS:     PHYSICAL EXAM:  General: Awake, alert, in no acute distress. Oriented x 3.  HEENT: Normocephalic, atraumatic. No scleral icterus.   Lymph: No cervical, supraclavicular, or axillary lymphadenopathy appreciated.   CV: Regular rate and rhythm. No murmurs, rubs, or gallops appreciated.  Resp: Good inspiratory effort, lungs clear to auscultation bilaterally.  GI: Abdomen soft, nontender, nondistended. No masses or organomegaly appreciated.   Ext: No peripheral edema bilaterally.  Neuro: CN II-XII grossly intact. No focal deficits.   Skin: No rash, unusual bruising or prominent lesions.  Psych: Pleasant, normal affect.    LABS:  I reviewed the following labs:  Lab Results   Component Value Date    WBC 6.8 06/28/2024    HGB 10.0 (L) 06/28/2024    HCT 32.4 (L) 06/28/2024     (H) 06/28/2024     06/28/2024    INR 1.03 03/21/2024    PTT 35 01/12/2024     Lab Results   Component Value Date     06/28/2024    POTASSIUM 3.7 06/28/2024    CR 1.01 06/28/2024    BUN 30.8  (H) 06/28/2024    CHLORIDE 106 06/28/2024    ERIN 8.0 (L) 06/28/2024     (H) 06/28/2024      Lab Results   Component Value Date    ALT 47 06/28/2024    AST 41 06/28/2024    ALKPHOS 72 06/28/2024    BILITOTAL 0.3 06/28/2024    BILIDIRECT 0.15 08/24/2015      Lab Results   Component Value Date     (H) 04/16/2024    URIC 5.9 04/16/2024 6/26/24 PET:  IMPRESSION:   1.  Substantially decreased focal hypermetabolism in the region of the  distal duodenum, consistent with positive partial response to  treatment.  2.  Generalized radiotracer uptake along the gastric wall, suggestive  of gastritis, recommend attention on follow-up.  3.  Cyst in the head/uncinate process of the pancreas without  hypermetabolism, recommend continued attention on MR follow-up.    Assessment & Plan  #  #    PLAN:  - RTC in 3 months with PET    Total of 30 minutes on patient visit, reviewing records, interpreting test results, placing orders, and documentation on the day of service.    Amber Drake MD  Attending Physician, [facility]       Again, thank you for allowing me to participate in the care of your patient.        Sincerely,        Amber Drake MD

## 2024-07-12 NOTE — NURSING NOTE
Chief Complaint   Patient presents with    Blood Draw     Labs drawn via  by RN. VS taken.     Labs collected from venipuncture by RN. Vitals taken. Checked in for appointment(s).     Charlotte Price RN

## 2024-07-12 NOTE — PROGRESS NOTES
Procedure: Left  Leg  Angiogram     Procedure Date :  8/27/2024    Procedure Time :  10:00am     Arrival Time: 9:00am    Admission Type: Outpatient    Surgeon:     Procedure Location: [facility]:  [address] [place] [address] (phone: [phone], Fax: [phone])    Consent Completed:No, Scanned: No    Reps Needed: Rep emailed on 7/22/2024 to confirm. See screenshot below.       Rep confirmed. See screenshot below.      Scheduled with: Yeimy    Anesthesia Needed: no IF Yes Please clarify that the CRNA, anesthesia and LEYDI/PACU resources are being added at scheduling    Blood Thinners Address: Message sent to support pool to review medications.    2 week Post Procedure Appointments: See appt desk    6 week Post Procedure Appointments: See appt desk

## 2024-07-12 NOTE — TELEPHONE ENCOUNTER
Form obtained and faxed to [employer] at fax [phone].     Patient update on MyChart.    Svitlana Potts RN on 7/12/2024 at 11:42 AM

## 2024-07-13 DIAGNOSIS — D47.Z1 PTLD (POST-TRANSPLANT LYMPHOPROLIFERATIVE DISORDER) (H): ICD-10-CM

## 2024-07-13 LAB — CMV DNA SPEC NAA+PROBE-ACNC: NOT DETECTED IU/ML

## 2024-07-14 ENCOUNTER — MYC MEDICAL ADVICE (OUTPATIENT)
Dept: ONCOLOGY | Facility: CLINIC | Age: 69
End: 2024-07-14
Payer: MEDICARE

## 2024-07-14 DIAGNOSIS — R11.0 NAUSEA: ICD-10-CM

## 2024-07-14 DIAGNOSIS — C83.398 DIFFUSE LARGE B-CELL LYMPHOMA OF SOLID ORGAN EXCLUDING SPLEEN: ICD-10-CM

## 2024-07-14 LAB — EBV DNA SERPL NAA+PROBE-ACNC: NOT DETECTED IU/ML

## 2024-07-15 RX ORDER — METOCLOPRAMIDE 5 MG/1
5 TABLET ORAL EVERY 6 HOURS PRN
Qty: 30 TABLET | Refills: 0 | Status: SHIPPED | OUTPATIENT
Start: 2024-07-15

## 2024-07-15 NOTE — CONFIDENTIAL NOTE
Metoclopramide Refill   Last prescribing provider: DR Drake     Last clinic visit date: 7/12/24 DR Drake     Recommendations for requested medication (if none, N/A): Copied from chart note   metoclopramide (REGLAN) 5 MG tablet, Take 1 tablet (5 mg) by mouth every 6 hours as needed (nausea), Disp: 30 tablet, Rfl: 0     Any other pertinent information (if none, N/A): N/A    Refilled: Y/N, if NO, why?

## 2024-07-16 DIAGNOSIS — E11.42 TYPE 2 DIABETES MELLITUS WITH DIABETIC POLYNEUROPATHY, WITH LONG-TERM CURRENT USE OF INSULIN (H): ICD-10-CM

## 2024-07-16 DIAGNOSIS — Z79.4 TYPE 2 DIABETES MELLITUS WITH DIABETIC POLYNEUROPATHY, WITH LONG-TERM CURRENT USE OF INSULIN (H): ICD-10-CM

## 2024-07-16 NOTE — TELEPHONE ENCOUNTER
Reviewed Blood Thinners and plan for holding, continuing and/or bridging: Aspirin: PLEASE DO NOT STOP THIS MEDICATION PRIOR TO SURGERY/ PROCEDURE.  and Plavix: PLEASE DO NOT STOP THIS MEDICATION PRIOR TO SURGERY/ PROCEDURE.     Reviewed Diabetic medications that are GLP-1 agonists: NA  Please discuss with your primary doctor and follow the hold instructions:   []  Hold seven (7) days prior for once weekly injectable doses [semaglutide (Ozempic, Wegovy), dulaglutide (Trulicity), exenatide ER (Bydureon), tirzepatide (Mounjaro)]  []  Hold the day before and day of for once daily injectable GLP-1 agonists [exenatide (Byetta), liraglutide (Saxenda, Victoza)]  []  Hold seven (7) days for oral semaglutide (Rybelsus)

## 2024-07-17 NOTE — TELEPHONE ENCOUNTER
[id number] ([employer]) of [facility] sent Rx request for the following:      Requested Prescriptions   Pending Prescriptions Disp Refills    blood glucose (NO BRAND SPECIFIED) test strip 300 strip 0     Sig: USE TO TEST BLOOD SUGAR 3 TIMES DAILY. DIAG CODE: E11.9     Last Prescription Date:   3/15/24  Last Fill Qty/Refills:         300, R-0    Last Office Visit:                6/1/24 6/6/24 (DM discussed)    Future Office visit:           None    Prescription refilled per RN Medication Refill Policy.................... Brooke Nicholas RN ....................  7/17/2024   2:20 PM

## 2024-07-22 NOTE — PROGRESS NOTES
Discussed medication holds per RN. Updated pt on procedure date, now 8/27/2024. Surgery packet sent via mail on 7/16/2024.

## 2024-07-25 ENCOUNTER — DOCUMENTATION ONLY (OUTPATIENT)
Dept: VASCULAR SURGERY | Facility: CLINIC | Age: 69
End: 2024-07-25
Payer: MEDICARE

## 2024-07-25 NOTE — PROGRESS NOTES
Surgery Scheduled    Discussed surgery plan/instructions with the pt. The pt will schedule a pre-op physical exam. Will send surgery packet vial mail once medications have reviewed by support pool.     Surgery/Procedure: CREATION, BYPASS, ARTERIAL, FEMORAL TO TIBIAL (Right)     Special Equipment: c arm  Length of Surgery: 7 hrs   Preoperative physical needed: Yes  Product Rep needed? Yes  Company? [employer], need 75 cm length    Location: [facility]:  [facility] [place] [address] (phone: [phone], Fax: [phone])    Please park in [address]. Enter through the main entrance. Check in at the Welcome Desk and you will be directed to the surgery unit.     Date: 9/20/2024    Time: 7:20am     Admission Type: Inpatient    Surgeon: Dr. Sneed    OR Confirmed & :  Yes with Sabra on 7/25/2024    IR Tech Needed: Not needed    Entered on provider calendar:  Yes    Post Op: See appt desk.     Wound Vac Needed: No    Home Care Needed: No    Ultrasound Contacted: Not needed    Reps Contacted: Email sent to [employer] on 7/25/2024. See screenshot below.      Rep confirmed 7/25/2024. See screenshot below.         Blood Thinners Addressed:  Message sent to support pool to review medications.  .     Stress Test Clearance: NO

## 2024-07-26 ENCOUNTER — HOSPITAL ENCOUNTER (EMERGENCY)
Facility: HOSPITAL | Age: 69
Discharge: HOME | End: 2024-07-26
Admitting: PHYSICIAN ASSISTANT
Payer: MEDICARE

## 2024-07-26 VITALS
OXYGEN SATURATION: 96 % | HEIGHT: 67 IN | TEMPERATURE: 98.4 F | RESPIRATION RATE: 20 BRPM | HEART RATE: 87 BPM | WEIGHT: 157 LBS | SYSTOLIC BLOOD PRESSURE: 112 MMHG | DIASTOLIC BLOOD PRESSURE: 76 MMHG | BODY MASS INDEX: 24.64 KG/M2

## 2024-07-26 DIAGNOSIS — S81.802A OPEN WOUND OF LEFT LOWER EXTREMITY, INITIAL ENCOUNTER: ICD-10-CM

## 2024-07-26 LAB
ALBUMIN SERPL BCG-MCNC: 3.3 G/DL (ref 3.5–5.2)
ALP SERPL-CCNC: 84 U/L (ref 40–150)
ALT SERPL W P-5'-P-CCNC: 40 U/L (ref 0–70)
ANION GAP SERPL CALCULATED.3IONS-SCNC: 10 MMOL/L (ref 7–15)
AST SERPL W P-5'-P-CCNC: 46 U/L (ref 0–45)
BASOPHILS # BLD MANUAL: 0.1 10E3/UL (ref 0–0.2)
BASOPHILS NFR BLD MANUAL: 1 %
BILIRUB SERPL-MCNC: 0.4 MG/DL
BUN SERPL-MCNC: 33.1 MG/DL (ref 8–23)
CALCIUM SERPL-MCNC: 8.2 MG/DL (ref 8.8–10.4)
CHLORIDE SERPL-SCNC: 104 MMOL/L (ref 98–107)
CREAT SERPL-MCNC: 1.32 MG/DL (ref 0.67–1.17)
EGFRCR SERPLBLD CKD-EPI 2021: 58 ML/MIN/1.73M2
EOSINOPHIL # BLD MANUAL: 0.1 10E3/UL (ref 0–0.7)
EOSINOPHIL NFR BLD MANUAL: 1 %
ERYTHROCYTE [DISTWIDTH] IN BLOOD BY AUTOMATED COUNT: 17.5 % (ref 10–15)
GLUCOSE SERPL-MCNC: 114 MG/DL (ref 70–99)
HCO3 SERPL-SCNC: 24 MMOL/L (ref 22–29)
HCT VFR BLD AUTO: 32.4 % (ref 40–53)
HGB BLD-MCNC: 9.9 G/DL (ref 13.3–17.7)
HOLD SPECIMEN: NORMAL
LACTATE SERPL-SCNC: 1.2 MMOL/L (ref 0.7–2)
LYMPHOCYTES # BLD MANUAL: 1.8 10E3/UL (ref 0.8–5.3)
LYMPHOCYTES NFR BLD MANUAL: 25 %
MCH RBC QN AUTO: 32.4 PG (ref 26.5–33)
MCHC RBC AUTO-ENTMCNC: 30.6 G/DL (ref 31.5–36.5)
MCV RBC AUTO: 106 FL (ref 78–100)
MONOCYTES # BLD MANUAL: 0.4 10E3/UL (ref 0–1.3)
MONOCYTES NFR BLD MANUAL: 5 %
MYELOCYTES # BLD MANUAL: 0.1 10E3/UL
MYELOCYTES NFR BLD MANUAL: 1 %
NEUTROPHILS # BLD MANUAL: 4.7 10E3/UL (ref 1.6–8.3)
NEUTROPHILS NFR BLD MANUAL: 67 %
NRBC # BLD AUTO: 0 10E3/UL
NRBC BLD AUTO-RTO: 0 /100
PLAT MORPH BLD: ABNORMAL
PLATELET # BLD AUTO: 279 10E3/UL (ref 150–450)
POTASSIUM SERPL-SCNC: 5.2 MMOL/L (ref 3.4–5.3)
PROT SERPL-MCNC: 5.4 G/DL (ref 6.4–8.3)
RBC # BLD AUTO: 3.06 10E6/UL (ref 4.4–5.9)
RBC MORPH BLD: ABNORMAL
SODIUM SERPL-SCNC: 138 MMOL/L (ref 135–145)
TOXIC GRANULES BLD QL SMEAR: PRESENT
WBC # BLD AUTO: 7 10E3/UL (ref 4–11)

## 2024-07-26 PROCEDURE — 85027 COMPLETE CBC AUTOMATED: CPT | Performed by: STUDENT IN AN ORGANIZED HEALTH CARE EDUCATION/TRAINING PROGRAM

## 2024-07-26 PROCEDURE — 82374 ASSAY BLOOD CARBON DIOXIDE: CPT | Performed by: STUDENT IN AN ORGANIZED HEALTH CARE EDUCATION/TRAINING PROGRAM

## 2024-07-26 PROCEDURE — 99283 EMERGENCY DEPT VISIT LOW MDM: CPT | Performed by: PHYSICIAN ASSISTANT

## 2024-07-26 PROCEDURE — 85007 BL SMEAR W/DIFF WBC COUNT: CPT | Performed by: STUDENT IN AN ORGANIZED HEALTH CARE EDUCATION/TRAINING PROGRAM

## 2024-07-26 PROCEDURE — 84075 ASSAY ALKALINE PHOSPHATASE: CPT | Performed by: STUDENT IN AN ORGANIZED HEALTH CARE EDUCATION/TRAINING PROGRAM

## 2024-07-26 PROCEDURE — 83605 ASSAY OF LACTIC ACID: CPT | Performed by: STUDENT IN AN ORGANIZED HEALTH CARE EDUCATION/TRAINING PROGRAM

## 2024-07-26 PROCEDURE — 36415 COLL VENOUS BLD VENIPUNCTURE: CPT | Performed by: STUDENT IN AN ORGANIZED HEALTH CARE EDUCATION/TRAINING PROGRAM

## 2024-07-26 RX ORDER — CEPHALEXIN 500 MG/1
500 CAPSULE ORAL 4 TIMES DAILY
Qty: 40 CAPSULE | Refills: 0 | Status: SHIPPED | OUTPATIENT
Start: 2024-07-26 | End: 2024-08-05

## 2024-07-26 ASSESSMENT — COLUMBIA-SUICIDE SEVERITY RATING SCALE - C-SSRS
1. IN THE PAST MONTH, HAVE YOU WISHED YOU WERE DEAD OR WISHED YOU COULD GO TO SLEEP AND NOT WAKE UP?: NO
2. HAVE YOU ACTUALLY HAD ANY THOUGHTS OF KILLING YOURSELF IN THE PAST MONTH?: NO
6. HAVE YOU EVER DONE ANYTHING, STARTED TO DO ANYTHING, OR PREPARED TO DO ANYTHING TO END YOUR LIFE?: NO
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
## 2024-07-26 NOTE — DISCHARGE INSTRUCTIONS
Vascular surgery will follow-up with you next week, continue your medications at home, I sent antibiotics to the pharmacy in [place].  Continue your at home pain medications.

## 2024-07-26 NOTE — ED TRIAGE NOTES
Patient here with wife as a referral from Dr Marie. Patient has a wound on right foot and left leg and is schedule for angioplasty on left leg 8/27, right foot surgery in September. Today patient spoke with Dr Marie and was recommended to ER to get procedure done sooner due to symptoms getting worse. Pain and redness is worse in left leg the past several days. Wife states the wound is getting deeper and bigger daily.

## 2024-07-29 NOTE — PROGRESS NOTES
Angio Update    Date: 8/1/2024    Time: 8:00am    Location: [facility]. [address] , [place] [address]     Spoke to IR Kalyan at 12:01pm.     Rep emailed with procedure update. See screenshot below.         Pt called at 12:15pm, notified of schedule change.     PO Appts have been rescheduled based on the new angiogram date.

## 2024-07-29 NOTE — PROGRESS NOTES
Resident/Fellow Attestation   I, Jerome Sharma, was present with the medical student who participated in the service and in the documentation of the note.  I have verified the history and personally performed the physical exam and medical decision making.  I agree with the assessment and plan of care as documented in the note.      Transitioning to ciprofloxacin for Pseudomonas infection, planning 10 day course. Goal to discharge tomorrow. Reducing from stress dose to pta dose steroids, simultaneously bringing down insulin.    Jerome Sharma MD  PGY3  Date of Service (when I saw the patient): 02/27/19    [facility], [place]    Progress Note - [place] 3 Service        Date of Admission:  2/24/2019    Assessment & Plan   Aubrey Duncan is a 62 yo male with a history of bilateral lung transplant 2/2 A1AT deficiency in 2013, T2DM, HIT, DVT, and hypertension who presented with weakness, fevers, mild cough in setting of recent influenzae exposure and was found to have Pseudomonas bacteremia and pneumonia.     Today    Transplant ID consult     Switched to PO Ciprofloxacin (10 day course)    Restarted PTA Lisinopril     Restarted PTA Lasix     Reduce insulin    Atovaquone instead of bactrim for PJP PPx    # Pseudomonas pneumonia and bacteremia   The patient initially presented with fever and hypotension but has been afebrile and hemodynamically stable. Procal is 1.4 and the WBC is 2.4. Chest X-ray 2/26/19 showed bibasilar and retrocardiac pulmonary consolidation. GNRs in 1/4 bottles 2/24 and sputum grew Pseudomonas.   - Follow up cultures  - Discontinue Zosyn IV and switch to PO Ciprofloxacin for 10 day treatment total.   - Transplant ID consult, recs appreciated    # Influenza A  Recent exposure to wife and treatment x5 days of therapy.  - Droplet isolation  - Continue Tamiflu x10 day treatmen ttotal    # T2DM, A1C 12.4%  Suspect the poor control is 2/2 to long term steroid use and inadequate diet  control. Patient was started on insulin drip in ED. Will monitor now that we have lowered his steroid dosing.   - Reduced to 25 units of glargine and 10 units TID w/meals with high dose sliding scale  - Hypoglycemia protocol  - Diabetes education consult, recs appreciated    # Hx of Bilateral Lung Transplant for A1AT deficiency   CMV: d+/r- EBV: d+/r+  - Continuing azathioprine 25 mg daily  - Discontinued stress dose hydrocortisone.   - Continue pta Prednisone (2.5 mg PO at Bedtime; 5 mg PO QDay)   - Continue PTA valcyte  - Continue PTA tacrolimus; will obtain another trough on 3/1 and discuss final dosing w/ pulm tomorrow  - Continue Atovaquone for PCP prophylaxis    # Elevated Alk Phos (651 2/26/19)  Other LFTs are WNL. The patient has been afebrile and has a benign abdominal exam. No indication for further work up at this time.   - outpatient follow-up     # SHELLEY, resolving   Creatinine is 1.10 today (improved from 1.42 2/24). Etiology likely secondary to prerenal/hypotension.  - restart PTA Lisinopril and Lasix      Chronic:  HTN: Restarted PTA Lisinopril and Lasix   Diarrhea: Held PTA loperamide   GERD: Continue PTA omeprazole       Diet: Room Service  Consistent Carbohydrate Diet [id number] Calories: Moderate Consistent CHO (4-6 CHO units/meal)    Fluids: PO  Lines: PIV  DVT Prophylaxis: Pneumatic Compression Devices  Naranjo Catheter: not present  Code Status: DNR/DNI      Disposition Plan   Expected discharge: Tomorrow, recommended to prior living arrangement once ride back home arrives.  Entered: Jose Mao 02/27/2019, 11:26 AM       The patient's care was discussed with the Attending Physician, Dr. Solomon.    Jose Mao  Medical Student  [facility] 3 Service  [facility], [place]  Pager: [phone]  Please see sticky note for cross cover information  ______________________________________________________________________    Interval History   Nursing notes and overnight  5 events reviewed.     No acute events overnight. The patient reports that his chest heaviness and sputum production have improved compared to yesterday. He reports that he is tolerating his diet but had multiple loose bowel movements over the last day. He does not report any chest pain, increased shortness of breath, nausea, vomiting, or focal abdominal pain.     4 point ROS is negative unless stated above     Data reviewed today: I reviewed all medications, new labs and imaging results over the last 24 hours.     Physical Exam   Vital Signs: Temp: 97  F (36.1  C) Temp src: Oral BP: 161/84 Pulse: 83 Heart Rate: 72 Resp: 16 SpO2: 98 % O2 Device: None (Room air)    Weight: 159 lbs 6.4 oz    General: Awake, alert, and cooperative. Sitting up in bed.   HEENT: Head atraumatic. Pupils equal and reactive to light. Mucus membranes dry. No oropharyngeal exudate and erythema.   Resp: Appears in no acute distress. Coarse breath sounds on the right, improved compared to yesterday.   CV: Regular rate and rhythm. No murmurs. DP, PT, and radial pulses palpable bilaterally.   Abd: Soft, non-tender. Normal bowel sounds. No rebound, guarding, or rigidity.   MS: No focal deformities. No edema bilaterally.   Neuro: No focal deficits.   Skin: Warm and Dry.   Psych: Normal affect       Data   Lab 02/27/19  0544   WBC 2.4*   HGB 11.1*   *         POTASSIUM 4.1   CHLORIDE 112*   CO2 21   BUN 28   CR 1.10   ANIONGAP 9   ERIN 8.1*   GLC 95   ALBUMIN 2.2*   PROTTOTAL 5.2*   BILITOTAL 0.4   ALKPHOS 659*   ALT 28   AST 33   TROPI  --      Recent Labs   Lab 02/27/19  1144 02/27/19  0544 02/27/19  0156 02/26/19  2248 02/26/19  1858 02/26/19  1733 02/26/19  1600  02/26/19  0630  02/25/19  0531  02/24/19  1039   GLC  --  95  --   --   --   --   --   --  137*  --  154*  --  381*   BGM 90  --  95 140* 127* 122* 159*   < >  --    < >  --    < >  --     < > = values in this interval not displayed.

## 2024-07-30 ENCOUNTER — TELEPHONE (OUTPATIENT)
Dept: FAMILY MEDICINE | Facility: OTHER | Age: 69
End: 2024-07-30
Payer: MEDICARE

## 2024-07-30 ENCOUNTER — TELEPHONE (OUTPATIENT)
Dept: VASCULAR SURGERY | Facility: CLINIC | Age: 69
End: 2024-07-30
Payer: MEDICARE

## 2024-07-30 NOTE — PROGRESS NOTES
Surgery Update    Date: 8/1/2024     Time: 7:30am     Location: [facility]: [address] [place] [address] (phone: [phone], Fax: [phone]) Please park in [address]. Enter through the main entrance. Check in at the Welcome Desk and you will be directed to the surgery unit.     Writer informed the pt his bypass surgery has been scheduled for Thursday 8/1/2024 at [facility]. Pt confirms new surgery date and time. Pt is going to try and schedule a pre-op physical at their local clinic. Pt is worried about getting a pre-op physical. Writer inform the provider team about the pt's concern, waiting for response.      Pt called at 11:10am- pt has a pre-op physical scheduled for tomorrow 7/31/2024 at [facility].

## 2024-07-30 NOTE — TELEPHONE ENCOUNTER
Patient is asking for an email with all of his surgery information that he needs to know included.  [email or web address]

## 2024-07-30 NOTE — TELEPHONE ENCOUNTER
Procedure: LLE angiogram    Date: 8/1/24    Surgeon: Dr. Grace Sneed    Called patient to review upcoming procedure. Reviewed visitor allowance of 1 person at this time.     Discussed procedure with patients and instructions: Yes    Reviewed Post Procedure Follow up plan and Appts made: Yes    Reviewed Covid Test Scheduled/Completed: NA    Reviewed Blood Thinners and plan for holding, continuing and/or bridging:Other- Aspirin and Plavix ok to take  ADDRESS INSULIN /DIABETIC MEDS WITH PCP    Reviewed Pre Op Appointment Required and Completed: N/A    Reviewed Allergies and if Contrast Allergy-Instructions and plan:  No contrast allergy    Reviewed Arrival Time of 7am and procedure time of 8am and location of procedure [facility]:  [address] [address] (Phone: [phone], Fax: [phone])    Please enter through the main entrance. Check in at the Welcome Desk and you will be directed to the surgery unit.         All questions answered and number provided if any further questions arise or changes in patient status.

## 2024-07-30 NOTE — PROGRESS NOTES
Angio Cancelled    The pt will be having surgery and angio has been cancelled per Dr. Sneed, 7/30/2024.     Writer called IR, spoke to Burke, angio has been cancelled.     Post-op angio appts have been cancelled.

## 2024-07-30 NOTE — TELEPHONE ENCOUNTER
Patient is having a vein bypass in his right leg on Thursday, 08/01/24 at [facility]. This has been moved up from September 20th. He needs a pre-op physical done no later than 07/31/24. Tessa alicea'd a provider add spot for Wednesday 08/31/24 at 1:45. Needs a 40 minute slot.  Gauri Boyd RN on 7/30/2024 at 9:37 AM

## 2024-07-30 NOTE — TELEPHONE ENCOUNTER
Writer sent an UPDATED surgery packet to the pt's MyChart. Pt was sent a letter on 7/25/2024 originally.

## 2024-07-31 ENCOUNTER — ANESTHESIA EVENT (OUTPATIENT)
Dept: SURGERY | Facility: HOSPITAL | Age: 69
DRG: 253 | End: 2024-07-31
Payer: MEDICARE

## 2024-07-31 ENCOUNTER — OFFICE VISIT (OUTPATIENT)
Dept: FAMILY MEDICINE | Facility: OTHER | Age: 69
End: 2024-07-31
Attending: PHYSICIAN ASSISTANT
Payer: MEDICARE

## 2024-07-31 VITALS
HEART RATE: 64 BPM | RESPIRATION RATE: 20 BRPM | WEIGHT: 155 LBS | TEMPERATURE: 97.2 F | HEIGHT: 68 IN | DIASTOLIC BLOOD PRESSURE: 62 MMHG | SYSTOLIC BLOOD PRESSURE: 110 MMHG | BODY MASS INDEX: 23.49 KG/M2

## 2024-07-31 DIAGNOSIS — I10 ESSENTIAL HYPERTENSION: ICD-10-CM

## 2024-07-31 DIAGNOSIS — N18.32 CHRONIC KIDNEY DISEASE, STAGE 3B (H): ICD-10-CM

## 2024-07-31 DIAGNOSIS — Z94.2 S/P LUNG TRANSPLANT (H): ICD-10-CM

## 2024-07-31 DIAGNOSIS — E88.01 ALPHA-1-ANTITRYPSIN DEFICIENCY (H): Chronic | ICD-10-CM

## 2024-07-31 DIAGNOSIS — E11.51 DIABETES MELLITUS WITH PERIPHERAL VASCULAR DISEASE (H): ICD-10-CM

## 2024-07-31 DIAGNOSIS — I73.9 PAD (PERIPHERAL ARTERY DISEASE) (H): ICD-10-CM

## 2024-07-31 DIAGNOSIS — Z01.818 PREOP GENERAL PHYSICAL EXAM: Primary | ICD-10-CM

## 2024-07-31 PROCEDURE — G0463 HOSPITAL OUTPT CLINIC VISIT: HCPCS

## 2024-07-31 PROCEDURE — 99214 OFFICE O/P EST MOD 30 MIN: CPT | Performed by: PHYSICIAN ASSISTANT

## 2024-07-31 ASSESSMENT — PAIN SCALES - GENERAL: PAINLEVEL: EXTREME PAIN (8)

## 2024-07-31 NOTE — PROGRESS NOTES
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    ALT 40 0 - 70 U/L    Protein Total 5.4 (L) 6.4 - 8.3 g/dL    Albumin 3.3 (L) 3.5 - 5.2 g/dL    Bilirubin Total 0.4 <=1.2 mg/dL   Lactic acid whole blood with 1x repeat in 2 hr when >2    Collection Time: 07/26/24  4:06 PM   Result Value Ref Range    Lactic Acid, Initial 1.2 0.7 - 2.0 mmol/L   CBC with platelets and differential    Collection Time: 07/26/24  4:06 PM   Result Value Ref Range    WBC Count 7.0 4.0 - 11.0 10e3/uL    RBC Count 3.06 (L) 4.40 - 5.90 10e6/uL    Hemoglobin 9.9 (L) 13.3 - 17.7 g/dL    Hematocrit 32.4 (L) 40.0 - 53.0 %     (H) 78 - 100 fL    MCH 32.4 26.5 - 33.0 pg    MCHC 30.6 (L) 31.5 - 36.5 g/dL    RDW 17.5 (H) 10.0 - 15.0 %    Platelet Count 279 150 - 450 10e3/uL    NRBCs per 100 WBC 0 <1 /100    Absolute NRBCs 0.0 10e3/uL   Extra Blood Culture Bottle    Collection Time: 07/26/24  4:06 PM   Result Value Ref Range    Hold Specimen JI    Manual Differential    Collection Time: 07/26/24  4:06 PM   Result Value Ref Range    % Neutrophils 67 %    % Lymphocytes 25 %    % Monocytes 5 %    % Eosinophils 1 %    %  Basophils 1 %    % Myelocytes 1 %    Absolute Neutrophils 4.7 1.6 - 8.3 10e3/uL    Absolute Lymphocytes 1.8 0.8 - 5.3 10e3/uL    Absolute Monocytes 0.4 0.0 - 1.3 10e3/uL    Absolute Eosinophils 0.1 0.0 - 0.7 10e3/uL    Absolute Basophils 0.1 0.0 - 0.2 10e3/uL    Absolute Myelocytes 0.1 (H) <=0.0 10e3/uL    RBC Morphology Confirmed RBC Indices     Platelet Assessment  Automated Count Confirmed. Platelet morphology is normal.     Automated Count Confirmed. Platelet morphology is normal.    Toxic Neutrophils Present (A) None Seen      No EKG this visit, completed in the last 90 days.  [id number] - sinus rhythm with inferior infarct, T wave abnormality -no significant changes    Revised Cardiac Risk Index (RCRI)  The patient has the following serious cardiovascular risks for perioperative complications:   - Diabetes Mellitus (on Insulin) = 1 point   - 1 diabetes on insulin    RCRI Interpretation: 1 point: Class II (low risk - 0.9% complication rate)    Signed Electronically by: Tessa Woodward PA-C  A copy of this evaluation report is provided to the requesting physician.

## 2024-07-31 NOTE — H&P (VIEW-ONLY)
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    ALT 40 0 - 70 U/L    Protein Total 5.4 (L) 6.4 - 8.3 g/dL    Albumin 3.3 (L) 3.5 - 5.2 g/dL    Bilirubin Total 0.4 <=1.2 mg/dL   Lactic acid whole blood with 1x repeat in 2 hr when >2    Collection Time: 07/26/24  4:06 PM   Result Value Ref Range    Lactic Acid, Initial 1.2 0.7 - 2.0 mmol/L   CBC with platelets and differential    Collection Time: 07/26/24  4:06 PM   Result Value Ref Range    WBC Count 7.0 4.0 - 11.0 10e3/uL    RBC Count 3.06 (L) 4.40 - 5.90 10e6/uL    Hemoglobin 9.9 (L) 13.3 - 17.7 g/dL    Hematocrit 32.4 (L) 40.0 - 53.0 %     (H) 78 - 100 fL    MCH 32.4 26.5 - 33.0 pg    MCHC 30.6 (L) 31.5 - 36.5 g/dL    RDW 17.5 (H) 10.0 - 15.0 %    Platelet Count 279 150 - 450 10e3/uL    NRBCs per 100 WBC 0 <1 /100    Absolute NRBCs 0.0 10e3/uL   Extra Blood Culture Bottle    Collection Time: 07/26/24  4:06 PM   Result Value Ref Range    Hold Specimen JI    Manual Differential    Collection Time: 07/26/24  4:06 PM   Result Value Ref Range    % Neutrophils 67 %    % Lymphocytes 25 %    % Monocytes 5 %    % Eosinophils 1 %    %  Basophils 1 %    % Myelocytes 1 %    Absolute Neutrophils 4.7 1.6 - 8.3 10e3/uL    Absolute Lymphocytes 1.8 0.8 - 5.3 10e3/uL    Absolute Monocytes 0.4 0.0 - 1.3 10e3/uL    Absolute Eosinophils 0.1 0.0 - 0.7 10e3/uL    Absolute Basophils 0.1 0.0 - 0.2 10e3/uL    Absolute Myelocytes 0.1 (H) <=0.0 10e3/uL    RBC Morphology Confirmed RBC Indices     Platelet Assessment  Automated Count Confirmed. Platelet morphology is normal.     Automated Count Confirmed. Platelet morphology is normal.    Toxic Neutrophils Present (A) None Seen      No EKG this visit, completed in the last 90 days.  EK2024 - sinus rhythm with inferior infarct, T wave abnormality -no significant changes    Revised Cardiac Risk Index (RCRI)  The patient has the following serious cardiovascular risks for perioperative complications:   - Diabetes Mellitus (on Insulin) = 1 point   - 1 diabetes on insulin    RCRI Interpretation: 1 point: Class II (low risk - 0.9% complication rate)    Signed Electronically by: Tessa Woodward PA-C  A copy of this evaluation report is provided to the requesting physician.

## 2024-07-31 NOTE — NURSING NOTE
"Patient presents to the clinic for pre-op.    FOOD SECURITY SCREENING QUESTIONS:    The next two questions are to help us understand your food security.  If you are feeling you need any assistance in this area, we have resources available to support you today.    Hunger Vital Signs:  Within the past 12 months we worried whether our food would run out before we got money to buy more. Never  Within the past 12 months the food we bought just didn't last and we didn't have money to get more. Never    Advance Care Directive on file? yes  Advance Care Directive provided to patient? Yes.      Chief Complaint   Patient presents with    Pre-Op Exam       Initial /62 (BP Location: Right arm, Patient Position: Sitting, Cuff Size: Adult Regular)   Pulse 64   Temp 97.2  F (36.2  C) (Tympanic)   Resp 20   Ht 1.727 m (5' 8\")   Wt 70.3 kg (155 lb)   BMI 23.57 kg/m   Estimated body mass index is 23.57 kg/m  as calculated from the following:    Height as of this encounter: 1.727 m (5' 8\").    Weight as of this encounter: 70.3 kg (155 lb).  Medication Reconciliation: complete        Vanda Yates LPN     "

## 2024-07-31 NOTE — PATIENT INSTRUCTIONS
How to Take Your Medication Before Surgery  Preoperative Medication Instructions   Continue:  -Protonix, duloxetine, Advair, amlodipine, Plavix, aspirin, carvedilol    Hold: Lisinopril morning of surgery.   Hold: Lantus and insulin aspart/NovoLog morning of surgery  Hold: Furosemide/Lasix morning of surgery    Patient Education   Preparing for Your Surgery  Getting started  A nurse will call you to review your health history and instructions. They will give you an arrival time based on your scheduled surgery time. Please be ready to share:  Your doctor's clinic name and phone number  Your medical, surgical, and anesthesia history  A list of allergies and sensitivities  A list of medicines, including herbal treatments and over-the-counter drugs  Whether the patient has a legal guardian (ask how to send us the papers in advance)  Please tell us if you're pregnant--or if there's any chance you might be pregnant. Some surgeries may injure a fetus (unborn baby), so they require a pregnancy test. Surgeries that are safe for a fetus don't always need a test, and you can choose whether to have one.   If you have a child who's having surgery, please ask for a copy of Preparing for Your Child's Surgery.    Preparing for surgery  Within 10 to 30 days of surgery: Have a pre-op exam (sometimes called an H&P, or History and Physical). This can be done at a clinic or pre-operative center.  If you're having a , you may not need this exam. Talk to your care team.  At your pre-op exam, talk to your care team about all medicines you take. If you need to stop any medicines before surgery, ask when to start taking them again.  We do this for your safety. Many medicines can make you bleed too much during surgery. Some change how well surgery (anesthesia) drugs work.  Call your insurance company to let them know you're having surgery. (If you don't have insurance, call [phone].)  Call your clinic if there's any change in  your health. This includes signs of a cold or flu (sore throat, runny nose, cough, rash, fever). It also includes a scrape or scratch near the surgery site.  If you have questions on the day of surgery, call your hospital or surgery center.  Eating and drinking guidelines  For your safety: Unless your surgeon tells you otherwise, follow the guidelines below.  Eat and drink as usual until 8 hours before you arrive for surgery. After that, no food or milk.  Drink clear liquids until 2 hours before you arrive. These are liquids you can see through, like water, Gatorade, and Propel Water. They also include plain black coffee and tea (no cream or milk), candy, and breath mints. You can spit out gum when you arrive.  If you drink alcohol: Stop drinking it the night before surgery.  If your care team tells you to take medicine on the morning of surgery, it's okay to take it with a sip of water.  Preventing infection  Shower or bathe the night before and morning of your surgery. Follow the instructions your clinic gave you. (If no instructions, use regular soap.)  Don't shave or clip hair near your surgery site. We'll remove the hair if needed.  Don't smoke or vape the morning of surgery. You may chew nicotine gum up to 2 hours before surgery. A nicotine patch is okay.  Note: Some surgeries require you to completely quit smoking and nicotine. Check with your surgeon.  Your care team will make every effort to keep you safe from infection. We will:  Clean our hands often with soap and water (or an alcohol-based hand rub).  Clean the skin at your surgery site with a special soap that kills germs.  Give you a special gown to keep you warm. (Cold raises the risk of infection.)  Wear special hair covers, masks, gowns and gloves during surgery.  Give antibiotic medicine, if prescribed. Not all surgeries need antibiotics.  What to bring on the day of surgery  Photo ID and insurance card  Copy of your health care directive, if you  have one  Glasses and hearing aids (bring cases)  You can't wear contacts during surgery  Inhaler and eye drops, if you use them (tell us about these when you arrive)  CPAP machine or breathing device, if you use them  A few personal items, if spending the night  If you have . . .  A pacemaker, ICD (cardiac defibrillator) or other implant: Bring the ID card.  An implanted stimulator: Bring the remote control.  A legal guardian: Bring a copy of the certified (court-stamped) guardianship papers.  Please remove any jewelry, including body piercings. Leave jewelry and other valuables at home.  If you're going home the day of surgery  You must have a responsible adult drive you home. They should stay with you overnight as well.  If you don't have someone to stay with you, and you aren't safe to go home alone, we may keep you overnight. Insurance often won't pay for this.  After surgery  If it's hard to control your pain or you need more pain medicine, please call your surgeon's office.  Questions?   If you have any questions for your care team, list them here: _________________________________________________________________________________________________________________________________________________________________________ ____________________________________ ____________________________________ ____________________________________  For informational purposes only. Not to replace the advice of your health care provider. Copyright   2003, 2019 [facility]. All rights reserved. Clinically reviewed by Alexandra Morris MD. [employer] [id number] - REV 12/22.

## 2024-08-01 ENCOUNTER — HOSPITAL ENCOUNTER (INPATIENT)
Facility: HOSPITAL | Age: 69
LOS: 1 days | Discharge: HOME | DRG: 253 | End: 2024-08-01
Attending: SURGERY | Admitting: SURGERY
Payer: MEDICARE

## 2024-08-01 ENCOUNTER — PREP FOR PROCEDURE (OUTPATIENT)
Dept: SURGERY | Facility: CLINIC | Age: 69
End: 2024-08-01

## 2024-08-01 ENCOUNTER — ANESTHESIA (OUTPATIENT)
Dept: SURGERY | Facility: HOSPITAL | Age: 69
DRG: 253 | End: 2024-08-01
Payer: MEDICARE

## 2024-08-01 VITALS
BODY MASS INDEX: 23.35 KG/M2 | SYSTOLIC BLOOD PRESSURE: 153 MMHG | DIASTOLIC BLOOD PRESSURE: 74 MMHG | TEMPERATURE: 97.5 F | WEIGHT: 153.6 LBS | HEART RATE: 76 BPM | RESPIRATION RATE: 17 BRPM | OXYGEN SATURATION: 99 %

## 2024-08-01 DIAGNOSIS — I73.9 PAD (PERIPHERAL ARTERY DISEASE) (H): Primary | ICD-10-CM

## 2024-08-01 LAB
ABO/RH(D): NORMAL
ANION GAP SERPL CALCULATED.3IONS-SCNC: 13 MMOL/L (ref 7–15)
ANTIBODY SCREEN: NEGATIVE
BASOPHILS # BLD AUTO: 0 10E3/UL (ref 0–0.2)
BASOPHILS NFR BLD AUTO: 0 %
BUN SERPL-MCNC: 35.1 MG/DL (ref 8–23)
CALCIUM SERPL-MCNC: 8.2 MG/DL (ref 8.8–10.4)
CHLORIDE SERPL-SCNC: 109 MMOL/L (ref 98–107)
CREAT SERPL-MCNC: 1.38 MG/DL (ref 0.67–1.17)
EGFRCR SERPLBLD CKD-EPI 2021: 55 ML/MIN/1.73M2
EOSINOPHIL # BLD AUTO: 0.2 10E3/UL (ref 0–0.7)
EOSINOPHIL NFR BLD AUTO: 2 %
ERYTHROCYTE [DISTWIDTH] IN BLOOD BY AUTOMATED COUNT: 16.5 % (ref 10–15)
GLUCOSE BLDC GLUCOMTR-MCNC: 103 MG/DL (ref 70–99)
GLUCOSE BLDC GLUCOMTR-MCNC: 67 MG/DL (ref 70–99)
GLUCOSE BLDC GLUCOMTR-MCNC: 71 MG/DL (ref 70–99)
GLUCOSE BLDC GLUCOMTR-MCNC: 79 MG/DL (ref 70–99)
GLUCOSE SERPL-MCNC: 65 MG/DL (ref 70–99)
HCO3 SERPL-SCNC: 22 MMOL/L (ref 22–29)
HCT VFR BLD AUTO: 31.4 % (ref 40–53)
HGB BLD-MCNC: 9.7 G/DL (ref 13.3–17.7)
IMM GRANULOCYTES # BLD: 0.4 10E3/UL
IMM GRANULOCYTES NFR BLD: 5 %
LYMPHOCYTES # BLD AUTO: 3.2 10E3/UL (ref 0.8–5.3)
LYMPHOCYTES NFR BLD AUTO: 41 %
MCH RBC QN AUTO: 32.8 PG (ref 26.5–33)
MCHC RBC AUTO-ENTMCNC: 30.9 G/DL (ref 31.5–36.5)
MCV RBC AUTO: 106 FL (ref 78–100)
MONOCYTES # BLD AUTO: 1.1 10E3/UL (ref 0–1.3)
MONOCYTES NFR BLD AUTO: 14 %
NEUTROPHILS # BLD AUTO: 3 10E3/UL (ref 1.6–8.3)
NEUTROPHILS NFR BLD AUTO: 38 %
NRBC # BLD AUTO: 0 10E3/UL
NRBC BLD AUTO-RTO: 0 /100
PLATELET # BLD AUTO: 238 10E3/UL (ref 150–450)
POTASSIUM SERPL-SCNC: 4.3 MMOL/L (ref 3.4–5.3)
RBC # BLD AUTO: 2.96 10E6/UL (ref 4.4–5.9)
SODIUM SERPL-SCNC: 144 MMOL/L (ref 135–145)
SPECIMEN EXPIRATION DATE: NORMAL
WBC # BLD AUTO: 7.9 10E3/UL (ref 4–11)

## 2024-08-01 PROCEDURE — 04JY0ZZ INSPECTION OF LOWER ARTERY, OPEN APPROACH: ICD-10-PCS | Performed by: SURGERY

## 2024-08-01 PROCEDURE — 272N000001 HC OR GENERAL SUPPLY STERILE: Performed by: SURGERY

## 2024-08-01 PROCEDURE — 36415 COLL VENOUS BLD VENIPUNCTURE: CPT | Performed by: SURGERY

## 2024-08-01 PROCEDURE — 258N000001 HC RX 258: Performed by: SURGERY

## 2024-08-01 PROCEDURE — 250N000011 HC RX IP 250 OP 636: Performed by: SURGERY

## 2024-08-01 PROCEDURE — 86900 BLOOD TYPING SEROLOGIC ABO: CPT | Performed by: SURGERY

## 2024-08-01 PROCEDURE — 258N000003 HC RX IP 258 OP 636: Performed by: ANESTHESIOLOGY

## 2024-08-01 PROCEDURE — 250N000011 HC RX IP 250 OP 636: Performed by: STUDENT IN AN ORGANIZED HEALTH CARE EDUCATION/TRAINING PROGRAM

## 2024-08-01 PROCEDURE — 99100 ANES PT EXTEME AGE<1 YR&>70: CPT | Performed by: NURSE ANESTHETIST, CERTIFIED REGISTERED

## 2024-08-01 PROCEDURE — 85025 COMPLETE CBC W/AUTO DIFF WBC: CPT | Performed by: SURGERY

## 2024-08-01 PROCEDURE — 80048 BASIC METABOLIC PNL TOTAL CA: CPT | Performed by: SURGERY

## 2024-08-01 PROCEDURE — 370N000017 HC ANESTHESIA TECHNICAL FEE, PER MIN: Performed by: SURGERY

## 2024-08-01 PROCEDURE — 710N000009 HC RECOVERY PHASE 1, LEVEL 1, PER MIN: Performed by: SURGERY

## 2024-08-01 PROCEDURE — 250N000009 HC RX 250: Performed by: STUDENT IN AN ORGANIZED HEALTH CARE EDUCATION/TRAINING PROGRAM

## 2024-08-01 PROCEDURE — 35703 EXPL N/FLWD SURG LXTR ART: CPT | Performed by: STUDENT IN AN ORGANIZED HEALTH CARE EDUCATION/TRAINING PROGRAM

## 2024-08-01 PROCEDURE — 258N000003 HC RX IP 258 OP 636: Performed by: STUDENT IN AN ORGANIZED HEALTH CARE EDUCATION/TRAINING PROGRAM

## 2024-08-01 PROCEDURE — 360N000084 HC SURGERY LEVEL 4 W/ FLUORO, PER MIN: Performed by: SURGERY

## 2024-08-01 PROCEDURE — 710N000012 HC RECOVERY PHASE 2, PER MINUTE: Performed by: SURGERY

## 2024-08-01 PROCEDURE — 250N000011 HC RX IP 250 OP 636: Performed by: ANESTHESIOLOGY

## 2024-08-01 PROCEDURE — 35703 EXPL N/FLWD SURG LXTR ART: CPT | Performed by: NURSE ANESTHETIST, CERTIFIED REGISTERED

## 2024-08-01 PROCEDURE — 250N000013 HC RX MED GY IP 250 OP 250 PS 637: Performed by: ANESTHESIOLOGY

## 2024-08-01 PROCEDURE — 250N000013 HC RX MED GY IP 250 OP 250 PS 637: Performed by: STUDENT IN AN ORGANIZED HEALTH CARE EDUCATION/TRAINING PROGRAM

## 2024-08-01 PROCEDURE — 120N000001 HC R&B MED SURG/OB: Performed by: ANESTHESIOLOGY

## 2024-08-01 PROCEDURE — 999N000141 HC STATISTIC PRE-PROCEDURE NURSING ASSESSMENT: Performed by: SURGERY

## 2024-08-01 PROCEDURE — 250N000025 HC SEVOFLURANE, PER MIN: Performed by: SURGERY

## 2024-08-01 PROCEDURE — 35703 EXPL N/FLWD SURG LXTR ART: CPT | Mod: RT | Performed by: SURGERY

## 2024-08-01 RX ORDER — SODIUM CHLORIDE, SODIUM LACTATE, POTASSIUM CHLORIDE, CALCIUM CHLORIDE 600; 310; 30; 20 MG/100ML; MG/100ML; MG/100ML; MG/100ML
INJECTION, SOLUTION INTRAVENOUS CONTINUOUS
Status: DISCONTINUED | OUTPATIENT
Start: 2024-08-01 | End: 2024-08-01 | Stop reason: HOSPADM

## 2024-08-01 RX ORDER — DEXAMETHASONE SODIUM PHOSPHATE 10 MG/ML
4 INJECTION, SOLUTION INTRAMUSCULAR; INTRAVENOUS
Status: DISCONTINUED | OUTPATIENT
Start: 2024-08-01 | End: 2024-08-01 | Stop reason: HOSPADM

## 2024-08-01 RX ORDER — DEXAMETHASONE SODIUM PHOSPHATE 10 MG/ML
INJECTION, SOLUTION INTRAMUSCULAR; INTRAVENOUS PRN
Status: DISCONTINUED | OUTPATIENT
Start: 2024-08-01 | End: 2024-08-01

## 2024-08-01 RX ORDER — OXYCODONE HYDROCHLORIDE 5 MG/1
5 TABLET ORAL
Status: COMPLETED | OUTPATIENT
Start: 2024-08-01 | End: 2024-08-01

## 2024-08-01 RX ORDER — FENTANYL CITRATE 50 UG/ML
INJECTION, SOLUTION INTRAMUSCULAR; INTRAVENOUS PRN
Status: DISCONTINUED | OUTPATIENT
Start: 2024-08-01 | End: 2024-08-01

## 2024-08-01 RX ORDER — PHENYLEPHRINE HCL IN 0.9% NACL 50MG/250ML
PLASTIC BAG, INJECTION (ML) INTRAVENOUS
Status: DISCONTINUED
Start: 2024-08-01 | End: 2024-08-01 | Stop reason: HOSPADM

## 2024-08-01 RX ORDER — ONDANSETRON 2 MG/ML
4 INJECTION INTRAMUSCULAR; INTRAVENOUS EVERY 30 MIN PRN
Status: DISCONTINUED | OUTPATIENT
Start: 2024-08-01 | End: 2024-08-01 | Stop reason: HOSPADM

## 2024-08-01 RX ORDER — FENTANYL CITRATE 50 UG/ML
25 INJECTION, SOLUTION INTRAMUSCULAR; INTRAVENOUS EVERY 5 MIN PRN
Status: DISCONTINUED | OUTPATIENT
Start: 2024-08-01 | End: 2024-08-01 | Stop reason: HOSPADM

## 2024-08-01 RX ORDER — FENTANYL CITRATE 50 UG/ML
50 INJECTION, SOLUTION INTRAMUSCULAR; INTRAVENOUS EVERY 5 MIN PRN
Status: DISCONTINUED | OUTPATIENT
Start: 2024-08-01 | End: 2024-08-01 | Stop reason: HOSPADM

## 2024-08-01 RX ORDER — LIDOCAINE 40 MG/G
CREAM TOPICAL
Status: DISCONTINUED | OUTPATIENT
Start: 2024-08-01 | End: 2024-08-01 | Stop reason: HOSPADM

## 2024-08-01 RX ORDER — MAGNESIUM SULFATE 4 G/50ML
4 INJECTION INTRAVENOUS ONCE
Status: COMPLETED | OUTPATIENT
Start: 2024-08-01 | End: 2024-08-01

## 2024-08-01 RX ORDER — DEXAMETHASONE SODIUM PHOSPHATE 4 MG/ML
4 INJECTION, SOLUTION INTRA-ARTICULAR; INTRALESIONAL; INTRAMUSCULAR; INTRAVENOUS; SOFT TISSUE
Status: DISCONTINUED | OUTPATIENT
Start: 2024-08-01 | End: 2024-08-01 | Stop reason: HOSPADM

## 2024-08-01 RX ORDER — HYDROMORPHONE HCL IN WATER/PF 6 MG/30 ML
0.4 PATIENT CONTROLLED ANALGESIA SYRINGE INTRAVENOUS EVERY 5 MIN PRN
Status: DISCONTINUED | OUTPATIENT
Start: 2024-08-01 | End: 2024-08-01 | Stop reason: HOSPADM

## 2024-08-01 RX ORDER — PROPOFOL 10 MG/ML
INJECTION, EMULSION INTRAVENOUS PRN
Status: DISCONTINUED | OUTPATIENT
Start: 2024-08-01 | End: 2024-08-01

## 2024-08-01 RX ORDER — LABETALOL HYDROCHLORIDE 5 MG/ML
10 INJECTION, SOLUTION INTRAVENOUS
Status: DISCONTINUED | OUTPATIENT
Start: 2024-08-01 | End: 2024-08-01 | Stop reason: HOSPADM

## 2024-08-01 RX ORDER — CEFAZOLIN SODIUM/WATER 2 G/20 ML
2 SYRINGE (ML) INTRAVENOUS
Status: COMPLETED | OUTPATIENT
Start: 2024-08-01 | End: 2024-08-01

## 2024-08-01 RX ORDER — PAPAVERINE HYDROCHLORIDE 30 MG/ML
INJECTION INTRAMUSCULAR; INTRAVENOUS
Status: DISCONTINUED
Start: 2024-08-01 | End: 2024-08-01 | Stop reason: HOSPADM

## 2024-08-01 RX ORDER — CEFAZOLIN SODIUM/WATER 2 G/20 ML
2 SYRINGE (ML) INTRAVENOUS SEE ADMIN INSTRUCTIONS
Status: DISCONTINUED | OUTPATIENT
Start: 2024-08-01 | End: 2024-08-01 | Stop reason: HOSPADM

## 2024-08-01 RX ORDER — ACETAMINOPHEN 325 MG/1
650 TABLET ORAL
Status: DISCONTINUED | OUTPATIENT
Start: 2024-08-01 | End: 2024-08-01 | Stop reason: HOSPADM

## 2024-08-01 RX ORDER — LIDOCAINE HYDROCHLORIDE 10 MG/ML
INJECTION, SOLUTION INFILTRATION; PERINEURAL PRN
Status: DISCONTINUED | OUTPATIENT
Start: 2024-08-01 | End: 2024-08-01

## 2024-08-01 RX ORDER — HYDROMORPHONE HYDROCHLORIDE 2 MG/1
2 TABLET ORAL ONCE
Status: COMPLETED | OUTPATIENT
Start: 2024-08-01 | End: 2024-08-01

## 2024-08-01 RX ORDER — NALOXONE HYDROCHLORIDE 0.4 MG/ML
0.1 INJECTION, SOLUTION INTRAMUSCULAR; INTRAVENOUS; SUBCUTANEOUS
Status: DISCONTINUED | OUTPATIENT
Start: 2024-08-01 | End: 2024-08-01 | Stop reason: HOSPADM

## 2024-08-01 RX ORDER — ONDANSETRON 4 MG/1
4 TABLET, ORALLY DISINTEGRATING ORAL EVERY 30 MIN PRN
Status: DISCONTINUED | OUTPATIENT
Start: 2024-08-01 | End: 2024-08-01 | Stop reason: HOSPADM

## 2024-08-01 RX ORDER — ONDANSETRON 2 MG/ML
INJECTION INTRAMUSCULAR; INTRAVENOUS PRN
Status: DISCONTINUED | OUTPATIENT
Start: 2024-08-01 | End: 2024-08-01

## 2024-08-01 RX ORDER — HYDROMORPHONE HCL IN WATER/PF 6 MG/30 ML
0.2 PATIENT CONTROLLED ANALGESIA SYRINGE INTRAVENOUS EVERY 5 MIN PRN
Status: DISCONTINUED | OUTPATIENT
Start: 2024-08-01 | End: 2024-08-01 | Stop reason: HOSPADM

## 2024-08-01 RX ORDER — ACETAMINOPHEN 325 MG/1
975 TABLET ORAL ONCE
Status: COMPLETED | OUTPATIENT
Start: 2024-08-01 | End: 2024-08-01

## 2024-08-01 RX ADMIN — ROCURONIUM BROMIDE 50 MG: 50 INJECTION, SOLUTION INTRAVENOUS at 08:15

## 2024-08-01 RX ADMIN — FENTANYL CITRATE 50 MCG: 50 INJECTION INTRAMUSCULAR; INTRAVENOUS at 08:01

## 2024-08-01 RX ADMIN — ACETAMINOPHEN 975 MG: 325 TABLET ORAL at 06:53

## 2024-08-01 RX ADMIN — ROCURONIUM BROMIDE 50 MG: 50 INJECTION, SOLUTION INTRAVENOUS at 08:01

## 2024-08-01 RX ADMIN — PROPOFOL 150 MG: 10 INJECTION, EMULSION INTRAVENOUS at 08:01

## 2024-08-01 RX ADMIN — LIDOCAINE HYDROCHLORIDE 5 ML: 10 INJECTION, SOLUTION INFILTRATION; PERINEURAL at 08:01

## 2024-08-01 RX ADMIN — DEXAMETHASONE SODIUM PHOSPHATE 10 MG: 10 INJECTION, SOLUTION INTRAMUSCULAR; INTRAVENOUS at 08:03

## 2024-08-01 RX ADMIN — SODIUM CHLORIDE, POTASSIUM CHLORIDE, SODIUM LACTATE AND CALCIUM CHLORIDE: 600; 310; 30; 20 INJECTION, SOLUTION INTRAVENOUS at 06:54

## 2024-08-01 RX ADMIN — SUGAMMADEX 200 MG: 100 INJECTION, SOLUTION INTRAVENOUS at 09:38

## 2024-08-01 RX ADMIN — HYDROMORPHONE HYDROCHLORIDE 2 MG: 2 TABLET ORAL at 11:15

## 2024-08-01 RX ADMIN — PHENYLEPHRINE HYDROCHLORIDE 0.2 MCG/KG/MIN: 10 INJECTION INTRAVENOUS at 08:44

## 2024-08-01 RX ADMIN — PROPOFOL 50 MG: 10 INJECTION, EMULSION INTRAVENOUS at 08:15

## 2024-08-01 RX ADMIN — ONDANSETRON 4 MG: 2 INJECTION INTRAMUSCULAR; INTRAVENOUS at 09:23

## 2024-08-01 RX ADMIN — MAGNESIUM SULFATE HEPTAHYDRATE 4 G: 80 INJECTION, SOLUTION INTRAVENOUS at 06:52

## 2024-08-01 RX ADMIN — FENTANYL CITRATE 50 MCG: 50 INJECTION INTRAMUSCULAR; INTRAVENOUS at 09:03

## 2024-08-01 RX ADMIN — Medication 2 G: at 08:15

## 2024-08-01 ASSESSMENT — ACTIVITIES OF DAILY LIVING (ADL)
ADLS_ACUITY_SCORE: 22
ADLS_ACUITY_SCORE: 23
ADLS_ACUITY_SCORE: 22
ADLS_ACUITY_SCORE: 34
ADLS_ACUITY_SCORE: 22

## 2024-08-01 ASSESSMENT — COPD QUESTIONNAIRES: COPD: 1

## 2024-08-01 NOTE — ANESTHESIA PROCEDURE NOTES
Airway       Patient location during procedure: OR       Procedure Start/Stop Times: 8/1/2024 8:06 AM  Staff -        CRNA: Nilda Cadena APRN CRNA       Performed By: CRNAIndications and Patient Condition       Indications for airway management: debra-procedural       Induction type:intravenous       Mask difficulty assessment: 1 - vent by mask    Final Airway Details       Final airway type: endotracheal airway       Successful airway: ETT - single  Endotracheal Airway Details        ETT size (mm): 7.5       Cuffed: yes       Successful intubation technique: direct laryngoscopy       DL Blade Type: MAC 3       Grade View of Cords: 1       Adjucts: stylet       Position: Right       Measured from: gums/teeth       Secured at (cm): 23       Bite block used: None    Post intubation assessment        Placement verified by: capnometry, equal breath sounds and chest rise        Number of attempts at approach: 1       Number of other approaches attempted: 0       Secured with: tape       Ease of procedure: easy       Dentition: Intact and Unchanged       Dental guard used and removed. Dental Guard Type: Standard White.    Medication(s) Administered   Medication Administration Time: 8/1/2024 8:06 AM

## 2024-08-01 NOTE — OR NURSING
Patient has simms catheter in place after aborted procedure. PACU RN called to Dr. Sneed, remove simms catheter and have patient void before discharge.

## 2024-08-01 NOTE — ANESTHESIA PROCEDURE NOTES
Arterial Line Procedure Note    Pre-Procedure   Staff -        Resident/Fellow: Osvaldo Rodriges MD       Performed By: resident       Location: OR       Pre-Anesthestic Checklist: patient identified, IV checked, risks and benefits discussed, informed consent, monitors and equipment checked, pre-op evaluation and at physician/surgeon's request  Timeout:       Correct Patient: Yes        Correct Procedure: Yes        Correct Site: Yes        Correct Position: Yes   Line Placement:   This line was placed Post Induction  Procedure   Procedure: arterial line       Laterality: left       Insertion Site: radial.  Sterile Prep        Standard elements of sterile barrier followed       Skin prep: Chloraprep  Insertion/Injection        Technique: ultrasound guided and Seldinger Technique        1. Ultrasound was used to evaluate the access site.       2. Artery evaluated via ultrasound for patency/adequacy.       3. Using real-time ultrasound the needle/catheter was observed entering the artery/vein.       4. Permanent image was captured and entered into the patient's record.       Catheter Type/Size: 20 G, 12 cm  Narrative         Secured by: suture       Tegaderm and Biopatch dressing used.       Complications: None apparent,        Arterial waveform: Yes        IBP within 10% of NIBP: Yes   Comments:  2 attempts, significant arterial calcification and unable to thread wire despite robust pulsatile flow on 1st attempt

## 2024-08-01 NOTE — ANESTHESIA CARE TRANSFER NOTE
Patient: Aubrey Duncan    Procedure: Procedure(s):  EXPLORATION OF RIGHT LOWER DISTAL ANTERIOR TIBIAL AND DORSALIS PEDIS       Diagnosis: PAD (peripheral artery disease) (H24) [I73.9]  Diagnosis Additional Information: No value filed.    Anesthesia Type:   General     Note:    Oropharynx: oropharynx clear of all foreign objects and spontaneously breathing  Level of Consciousness: awake  Oxygen Supplementation: face mask  Level of Supplemental Oxygen (L/min / FiO2): 6  Independent Airway: airway patency satisfactory and stable  Dentition: dentition unchanged  Vital Signs Stable: post-procedure vital signs reviewed and stable  Report to RN Given: handoff report given  Patient transferred to: PACU    Handoff Report: Identifed the Patient, Identified the Reponsible Provider, Reviewed the pertinent medical history, Discussed the surgical course, Reviewed Intra-OP anesthesia mangement and issues during anesthesia, Set expectations for post-procedure period and Allowed opportunity for questions and acknowledgement of understanding      Vitals:  Vitals Value Taken Time   /69 08/01/24 0955   Temp 36.7  C (98  F) 08/01/24 0949   Pulse 71 08/01/24 0955   Resp 18 08/01/24 0955   SpO2 99 % 08/01/24 0955       Electronically Signed By: BRANDI Junior CRNA  August 1, 2024  9:57 AM

## 2024-08-01 NOTE — OR NURSING
Patient is allergic to oxycodone, however has pain in right foot. PACU RN notified to Dr. Rodriges for new orders of 2 mg Dilaudid PO.
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
## 2024-08-01 NOTE — INTERVAL H&P NOTE
I have reviewed the surgical (or preoperative) H&P that is linked to this encounter, and examined the patient. There are no significant changes    Clinical Conditions Present on Arrival:  Clinically Significant Risk Factors Present on Admission              # Hypoalbuminemia: Lowest albumin = 3 g/dL in the past 30 days , will monitor as appropriate    # Drug Induced Platelet Defect: home medication list includes an antiplatelet medication

## 2024-08-01 NOTE — OR NURSING
Glucose 71 and 69 reported to [facility]. Pt asymptomatic. No new orders at this time

## 2024-08-01 NOTE — OR NURSING
Patient in PACU with arterial line in place, no longer working. Called to BENITA Tejeda to remove.

## 2024-08-01 NOTE — PHARMACY-ADMISSION MEDICATION HISTORY
Pharmacist Admission Medication History    Admission medication history is complete. The information provided in this note is only as accurate as the sources available at the time of the update.    Information Source(s): Patient and Family member via in-person    Pertinent Information: none    Changes made to PTA medication list:  Added: Diclofenac gel  Deleted: tacrolimus (duplicate)  Changed: None    Allergies reviewed with patient and updates made in EHR: yes    Medication History Completed By: Billy Daley [facility] 8/1/2024 8:10 AM    PTA Med List   Medication Sig Last Dose    acetaminophen (TYLENOL) 325 MG tablet Take 2 tablets (650 mg) by mouth every 6 hours as needed for mild pain  at PRN    acyclovir (ZOVIRAX) 400 MG tablet Take 1 tablet (400 mg) by mouth 2 times daily 7/31/2024 at AM    albuterol (PROAIR HFA/PROVENTIL HFA/VENTOLIN HFA) 108 (90 Base) MCG/ACT inhaler Inhale 1-2 puffs into the lungs every 6 hours as needed for shortness of breath or wheezing  at PRN    amLODIPine (NORVASC) 10 MG tablet TAKE 1 TABLET (10 MG) BY MOUTH DAILY 8/1/2024 at AM    aspirin (ASA) 81 MG EC tablet Take 1 tablet (81 mg) by mouth daily 8/1/2024 at AM    azithromycin (ZITHROMAX) 250 MG tablet Take 250 mg by mouth Every Mon, Wed, Fri Morning 7/31/2024 at AM    Calcium Carbonate-Vitamin D 600-10 MG-MCG TABS Take 1 tablet by mouth 2 times daily (with meals) 7/30/2024 at AM    carvedilol (COREG) 6.25 MG tablet TAKE 1 TABLET (6.25 MG) BY MOUTH 2 TIMES DAILY (WITH MEALS) 8/1/2024 at AM    cephALEXin (KEFLEX) 500 MG capsule Take 1 capsule (500 mg) by mouth 4 times daily for 10 days 8/1/2024 at AM    clopidogrel (PLAVIX) 75 MG tablet Take 1 tablet (75 mg) by mouth daily 8/1/2024 at AM    dapsone (ACZONE) 25 MG tablet Take 2 tablets (50 mg) by mouth daily 7/31/2024 at AM    diclofenac (VOLTAREN) 1 % topical gel Apply 2 g topically 2 times daily as needed for moderate pain  at PRN    DULoxetine (CYMBALTA) 20 MG capsule Take 1 capsule  (20 mg) by mouth daily 7/31/2024 at AM    econazole nitrate 1 % external cream APPLY TOPICALLY DAILY TO FEET AND HEELS.  at PRN    fludrocortisone (FLORINEF) 0.1 MG tablet Take 1 tablet (0.1 mg) by mouth daily 7/31/2024 at AM    fluticasone-salmeterol (ADVAIR-HFA) 230-21 MCG/ACT inhaler Inhale 2 puffs into the lungs 2 times daily 8/1/2024 at AM    furosemide (LASIX) 20 MG tablet Take 1 tablet (20 mg) by mouth daily 7/31/2024 at AM    insulin aspart (NOVOLOG PEN) 100 UNIT/ML pen Take 5 U am, 3 unit(s) non, 5 unit(s) pm of insulin within 30 minutes of start of breakfast, lunch, and dinner. Do not give if blood sugar is less than 70 mg/dl. 7/31/2024    insulin glargine (LANTUS PEN) 100 UNIT/ML pen Inject 18 Units Subcutaneous every morning (before breakfast) 7/31/2024 at AM    lisinopril (ZESTRIL) 40 MG tablet TAKE 1 TABLET (40 MG) BY MOUTH DAILY IN THE MORNING 7/31/2024 at AM    loperamide (IMODIUM) 2 MG capsule Take 1 capsule (2 mg) by mouth 4 times daily as needed for diarrhea  at PRN    magnesium oxide (MAG-OX) 400 MG tablet Take 1 tablet (400 mg) by mouth 2 times daily 7/31/2024 at PM    metoclopramide (REGLAN) 5 MG tablet Take 1 tablet (5 mg) by mouth every 6 hours as needed (nausea)  at PRN    montelukast (SINGULAIR) 10 MG tablet Take 1 tablet (10 mg) by mouth every evening 7/31/2024 at PM    multivitamin, therapeutic (THERA-VIT) TABS Take 1 tablet by mouth daily 7/31/2024 at AM    pantoprazole (PROTONIX) 40 MG EC tablet Take 1 tablet (40 mg) by mouth daily 7/31/2024 at AM    predniSONE (DELTASONE) 5 MG tablet TAKE ONE TABLET BY MOUTH ONCE DAILY IN THE MORNING AND ONE-HALF TABLET IN THE EVENING 7/31/2024 at 1200    rosuvastatin (CRESTOR) 40 MG tablet Take 20 mg by mouth Every Mon, Wed, Fri Morning 7/31/2024 at AM    sildenafil (VIAGRA) 25 MG tablet Take 1 tablet (25 mg) by mouth as needed (as needed)  at PRN    tacrolimus (GENERIC EQUIVALENT) 1 MG capsule Take 3 capsules (3 mg) by mouth every morning AND 3  capsules (3 mg) every evening. Total dose: 3 mg in AM and 3 mg in PM 7/31/2024 at PM

## 2024-08-02 ENCOUNTER — HOSPITAL ENCOUNTER (INPATIENT)
Facility: HOSPITAL | Age: 69
Setting detail: SURGERY ADMIT
End: 2024-08-02
Attending: SURGERY | Admitting: SURGERY
Payer: MEDICARE

## 2024-08-02 ENCOUNTER — HOSPITAL ENCOUNTER (EMERGENCY)
Facility: OTHER | Age: 69
Discharge: HOME | End: 2024-08-02
Attending: FAMILY MEDICINE | Admitting: FAMILY MEDICINE
Payer: MEDICARE

## 2024-08-02 ENCOUNTER — DOCUMENTATION ONLY (OUTPATIENT)
Dept: VASCULAR SURGERY | Facility: CLINIC | Age: 69
End: 2024-08-02
Payer: MEDICARE

## 2024-08-02 VITALS
HEART RATE: 88 BPM | HEIGHT: 68 IN | OXYGEN SATURATION: 98 % | BODY MASS INDEX: 23.19 KG/M2 | SYSTOLIC BLOOD PRESSURE: 120 MMHG | WEIGHT: 153 LBS | RESPIRATION RATE: 16 BRPM | TEMPERATURE: 98 F | DIASTOLIC BLOOD PRESSURE: 61 MMHG

## 2024-08-02 DIAGNOSIS — T81.31XA WOUND DISRUPTION, POST-OP, SKIN, INITIAL ENCOUNTER: ICD-10-CM

## 2024-08-02 PROCEDURE — 99282 EMERGENCY DEPT VISIT SF MDM: CPT | Performed by: FAMILY MEDICINE

## 2024-08-02 ASSESSMENT — ENCOUNTER SYMPTOMS
CHILLS: 0
FEVER: 0

## 2024-08-02 ASSESSMENT — ACTIVITIES OF DAILY LIVING (ADL): ADLS_ACUITY_SCORE: 33

## 2024-08-02 NOTE — OP NOTE
VASCULAR SURGERY OPERATIVE REPORT        LOCATION:    [facility]    Aubrey Duncan   Medical Record #:  [id number]  YOB: 1955  Age:  69 year old     Date of Service: August 1, 2024  PRIMARY CARE PROVIDER: Hoa Olivarez    Preoperative diagnosis    Chronic infected ischemia on bilateral extremities.    Postoperative diagnosis    Same      Surgeon: Grace Sneed MD, RPVI   Assistant: Svetlana Escobedo PA-C    I attest that no qualified resident or fellow was available to assist for the surgery because there were no surgical resident or fellows available due to residency rotation.  Circumstances required the skills of Svetlana Escobedo PA-C to assist with all parts of this operation including incision, dissection, anastomosis, and wound closure.                        Name of the procedure    Exploration of right anterior tibial artery and dorsalis pedis artery    Anesthesia:    General    Indication for procedure:    69-year-old male with chronic limb threatening ischemia  Bilateral extremities but right is worse than left.  Preoperatively patient underwent right lower extremity angiogram which did show very poor flow below the knee with very faint reconstitution of flow at the level of distal anterior tibial artery.  Patient was found to be not a candidate for endovascular intervention and was scheduled for bypass surgery.  I did tell him that there is a 50-50 chance that this bypass may not even work due to significant outflow issue.  Patient understood and would like to proceed.    Description of procedure:    The patient was placed supine on the operating table. The arms were placed at 80 . Normal bony prominences were padded. The anesthesia team placed appropriate lines and general anesthesia was induced. A Naranjo catheter was placed under sterile technique. The patient s lower abdomen and both lower extremities were circumferentially prepped and draped in the usual sterile  fashion. Preoperative antibiotics were administered prior to skin incision.  We started with an exposure of anterior tibial artery.  A longitudinal incision was made on the distal lateral leg.  Subcutaneous tissue was divided using electrocautery.  Immediately we noticed significant pitting edema with very pale muscle compartments.  Anterior tibial artery was identified between anterior tibialis muscle and extensor digitorum longus.  The artery was found to be very calcified and not suitable  this target.  We carried the dissection distally and explore the distal AT and long segment of proximal dorsalis pedis.  We found that the artery is not suitable for bypass creation.  At that point the procedure was  aborted.  The skin was closed with interrupted 3-0 nylon suture.  Island dressing was applied.  Patient will need above-knee amputation.      Estimated blood loss: Minimal    Specimens: None    Complications: None      Grace Sneed MD,  [facility]  VASCULAR SURGERY

## 2024-08-02 NOTE — PROGRESS NOTES
Surgery Scheduled    Discussed surgery plan/instructions. Pt had a pre-op physical on 7/31/2024. Will send surgery packet once medications have been reviewed.     Surgery/Procedure: AMPUTATION, ABOVE KNEE (Right)     Special Equipment: none  Length of Surgery: 1 hr  Preoperative physical needed: Yes  Product Rep needed? No  Company?NA    Location: [facility]:  [address] [place] [id number] (phone: [phone], Fax: [phone])    Please park in Lot [address]. Enter through the main entrance. Check in at the Welcome Desk and you will be directed to the surgery unit.     Date: 8/28/2024    Time: 2:30pm    Admission Type: Inpatient    Surgeon: Dr. Sneed    OR Confirmed & :  Yes with Sabra  on 8/2/2024    IR Tech Needed:  No     Entered on provider calendar:  Yes    Post Op: See appt desk.     Wound Vac Needed:  TBD    Home Care Needed:  TBD    Ultrasound Contacted: Not needed.    Reps Contacted: Not needed    Blood Thinners Addressed:  Message sent to support pool to review medications.    Stress Test Clearance: NO

## 2024-08-02 NOTE — PROGRESS NOTES
Writer called pt to schedule BKA. Pt is not available to discuss scheduling at this time. Pt will call back to schedule surgery.
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
## 2024-08-03 NOTE — ED TRIAGE NOTES
"Pt is a 69 year old male patient who was supposed to have a vein bypass in his right foot yesterday at [facility] in [facility]. During the surgery they were unable to complete the surgery due to the calcification of the veins. The foot is wrapped at this time, they are unsure what kind of stitching or anything is underneath. Tonight his foot began to leak about an hour ago, and was full of red fluid. They called the surgeon team and they told him to come in and have it assessed. Pt states the next step is amputation of the leg. Pt does report the foot to be aching but denies any other pain. Has been taking Tylenol as ordered by his surgeon.       /61   Pulse 88   Temp 98  F (36.7  C) (Tympanic)   Resp 16   Ht 1.727 m (5' 8\")   Wt 69.4 kg (153 lb)   SpO2 98%   BMI 23.26 kg/m      "

## 2024-08-07 ENCOUNTER — OFFICE VISIT (OUTPATIENT)
Dept: FAMILY MEDICINE | Facility: OTHER | Age: 69
End: 2024-08-07
Payer: MEDICARE

## 2024-08-07 VITALS
RESPIRATION RATE: 18 BRPM | BODY MASS INDEX: 22.79 KG/M2 | DIASTOLIC BLOOD PRESSURE: 69 MMHG | WEIGHT: 150.4 LBS | SYSTOLIC BLOOD PRESSURE: 105 MMHG | HEART RATE: 96 BPM | HEIGHT: 68 IN | TEMPERATURE: 97.6 F | OXYGEN SATURATION: 98 %

## 2024-08-07 DIAGNOSIS — T14.8XXA HEMATOMA OF SKIN: Primary | ICD-10-CM

## 2024-08-07 PROCEDURE — G0463 HOSPITAL OUTPT CLINIC VISIT: HCPCS

## 2024-08-07 PROCEDURE — 99213 OFFICE O/P EST LOW 20 MIN: CPT | Performed by: FAMILY MEDICINE

## 2024-08-07 RX ORDER — ACYCLOVIR 400 MG/1
400 TABLET ORAL 2 TIMES DAILY
Qty: 60 TABLET | Refills: 3 | Status: SHIPPED | OUTPATIENT
Start: 2024-08-07

## 2024-08-07 ASSESSMENT — PAIN SCALES - GENERAL: PAINLEVEL: MODERATE PAIN (5)

## 2024-08-07 NOTE — PATIENT INSTRUCTIONS
This looks like a hematoma from the lab draw, likely in large part to being on plavix and aspirin.  With your vascular history I would not stop either medication.  I think the bleeding has stopped. Likely stopped awhile ago but it will take awhile for the blood to be broken down in your arm and that is painful.  Initially icing and elevation is key but at this point I think you could also try moist heat as well to see if it breaks down the hematoma faster (as I do not think you are having any more new bleeding into the space).    I would be worried about an infection if you developed fever >100.5, redness in the area rather than maroon/yellow hue, or any drainage or soft spongy area that feels like a water balloon.    Also if you develop any numbness or problems with blood flow (decreased pulse or cap refill) you need to go to the ED.

## 2024-08-07 NOTE — TELEPHONE ENCOUNTER
Pending Prescriptions:                       Disp   Refills    acyclovir (ZOVIRAX) 400 MG tablet [Pharma*60 tab*3            Sig: TAKE 1 TABLET (400 MG) BY MOUTH 2 TIMES DAILY    Last prescribing provider:     Last clinic visit date: 07/12/24     Recommendations for requested medication (if none, N/A): Copied from last OV note: #Ppx  - ID ppx: continue  mg BID.   - Vaccines: up to date on flu, COVID, RSV, pneumonia, Tdap vaccines. Only had one dose of Shingrix in 2018 but had bad reaction.       Any other pertinent information (if none, N/A): N/A    Refilled: Y/N, if NO, why?

## 2024-08-07 NOTE — PROGRESS NOTES
"  Assessment & Plan     (T14.8XXA) Hematoma of skin  (primary encounter diagnosis)  Comment: At present I do not see any evidence of infection.  Discussed with patient management of iatrogenic hematoma from IV.  Initially would suggest ice and compress but I do not see evidence of ongoing bleeding and now 6 days out could alternate ice with heat or try moist heat alone to see if that helps to break down residual hematoma.  I do think elevation would be helpful.    Please see AVS.  Recommendations given to monitor for signs or symptoms of infection or any vascular or neurologic compromise.            No follow-ups on file.    Albert Burgos is a 69 year old, presenting for the following health issues:  Arm Pain (Left arm )    HPI   Patient comes in due to 6-day history of pain in his left arm.  On August 1 he had IV placed in left arm, first was in forearm and did not work but did cause bleeding, pain and swelling in the area.  Second attempt in the AC seem to work.  Afterwards had progressive localized swelling and pain.  Also had some ongoing bleeding and then the following day or two serosanguineous drainage but that has resolved.  Has not really changed but continues to be tender in the area and now a little more sore distally to the wrist.  No fevers or chills.  He is on Plavix and aspirin and has extensive history of vascular disease.    He has not been using ice or compression.  He has continued to take both antiplatelets.    Patient reports he has nonhealing ulcers on his feet and recently had vascular procedure on his lower right leg.  It continues to drain clear fluid.  He is not concerned about this and reports he has follow-up scheduled.                      Objective    /69 (BP Location: Right arm, Patient Position: Sitting, Cuff Size: Adult Regular)   Pulse 96   Temp 97.6  F (36.4  C) (Temporal)   Resp 18   Ht 1.727 m (5' 8\")   Wt 68.2 kg (150 lb 6.4 oz)   SpO2 98%   BMI 22.87 kg/m  "   Body mass index is 22.87 kg/m .  Physical Exam     Skin: Currently no openings or unhealed areas on his arm.  He does have extensive bruising from mid humerus down to his wrist.  There is firm fullness consistent with hematoma underlying the area where IV was attempted and forearm.  He has evidence of resolving bruise distally with yellow hue.  There is no warmth, erythema or fluctuance present.  CV: Radial pulse 2+ and bilaterally symmetric.  Cap refill similarly normal.  Neuro: No loss of sensation or strength in the left hand.            Signed Electronically by: Newton Alarcon MD

## 2024-08-11 ENCOUNTER — MYC MEDICAL ADVICE (OUTPATIENT)
Dept: VASCULAR SURGERY | Facility: CLINIC | Age: 69
End: 2024-08-11
Payer: MEDICARE

## 2024-08-12 ENCOUNTER — TELEPHONE (OUTPATIENT)
Dept: VASCULAR SURGERY | Facility: CLINIC | Age: 69
End: 2024-08-12
Payer: MEDICARE

## 2024-08-12 NOTE — TELEPHONE ENCOUNTER
Called patient to discuss his [employer] message from yesterday. He is having increasing pain in his foot and requesting prescription for diluadid.    Discussed with Dr. Steele, no narcotics will be prescribed but did offer the patient to move up his amputation surgery to tomorrow. Patient declined, stating they are in the process of selling their house. He is opting to wait until 8/28.

## 2024-08-13 ENCOUNTER — OFFICE VISIT (OUTPATIENT)
Dept: FAMILY MEDICINE | Facility: OTHER | Age: 69
End: 2024-08-13
Attending: NURSE PRACTITIONER
Payer: MEDICARE

## 2024-08-13 VITALS
HEART RATE: 92 BPM | DIASTOLIC BLOOD PRESSURE: 63 MMHG | HEIGHT: 68 IN | WEIGHT: 144 LBS | BODY MASS INDEX: 21.82 KG/M2 | OXYGEN SATURATION: 97 % | RESPIRATION RATE: 20 BRPM | SYSTOLIC BLOOD PRESSURE: 94 MMHG | TEMPERATURE: 97.5 F

## 2024-08-13 DIAGNOSIS — D84.821 IMMUNODEFICIENCY DUE TO DRUGS (CODE) (H): ICD-10-CM

## 2024-08-13 DIAGNOSIS — M79.671 RIGHT FOOT PAIN: ICD-10-CM

## 2024-08-13 DIAGNOSIS — E11.42 TYPE 2 DIABETES MELLITUS WITH DIABETIC POLYNEUROPATHY, WITH LONG-TERM CURRENT USE OF INSULIN (H): ICD-10-CM

## 2024-08-13 DIAGNOSIS — I73.9 PAD (PERIPHERAL ARTERY DISEASE) (H): Primary | ICD-10-CM

## 2024-08-13 DIAGNOSIS — S91.109D OPEN WOUND OF TOE, SUBSEQUENT ENCOUNTER: ICD-10-CM

## 2024-08-13 DIAGNOSIS — Z79.4 TYPE 2 DIABETES MELLITUS WITH DIABETIC POLYNEUROPATHY, WITH LONG-TERM CURRENT USE OF INSULIN (H): ICD-10-CM

## 2024-08-13 PROCEDURE — G2211 COMPLEX E/M VISIT ADD ON: HCPCS | Performed by: NURSE PRACTITIONER

## 2024-08-13 PROCEDURE — 99214 OFFICE O/P EST MOD 30 MIN: CPT | Performed by: NURSE PRACTITIONER

## 2024-08-13 PROCEDURE — G0463 HOSPITAL OUTPT CLINIC VISIT: HCPCS | Performed by: NURSE PRACTITIONER

## 2024-08-13 RX ORDER — HYDROMORPHONE HYDROCHLORIDE 2 MG/1
1-2 TABLET ORAL EVERY 6 HOURS PRN
Qty: 60 TABLET | Refills: 0 | Status: ON HOLD | OUTPATIENT
Start: 2024-08-13 | End: 2024-09-04

## 2024-08-13 ASSESSMENT — PAIN SCALES - GENERAL: PAINLEVEL: EXTREME PAIN (8)

## 2024-08-13 NOTE — NURSING NOTE
Patient presents today for concerns after surgery.    Medication Reconciliation Complete    Yohana Barreto LPN  8/13/2024 1:11 PM

## 2024-08-13 NOTE — PROGRESS NOTES
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  [facility] reviewed and as expected.  Order for hydromorphone placed for him to use for acute pain, he is aware that this is a one-time prescription, any refills will need to be done in person.  He is immunocompromise, concerns for healing along with peripheral artery disease concerns.  Type 2 diabetes has been stable.  He will be establishing care with primary care after his move.    The longitudinal plan of care for the diagnosis(es)/condition(s) as documented were addressed during this visit. Due to the added complexity in care, I will continue to support Aubrey in the subsequent management and with ongoing continuity of care.       MED REC REQUIRED  Post Medication Reconciliation Status: discharge medications reconciled and changed, per note/orders      No follow-ups on file.      Subjective   Aubrey is a 69 year old, presenting for the following health issues:  Derm Problem    History of Present Illness       Reason for visit:  Sore and achy legs    He eats 2-3 servings of fruits and vegetables daily.He consumes 0 sweetened beverage(s) daily.   He is taking medications regularly.     He presents to clinic today for pain management.  Peripheral artery disease, surgeon attempted  "bypass to right lower extremity, was unsuccessful.  Due to wounds on feet, pain and circulatory issues, amputation is scheduled, above the knee later this month.  He has been using 2 to 3 tablets of Tylenol every 4-6 hours to help with pain, this has not been relieving pain enough.  He is wondering about a prescription for stronger pain medication until he is able to have surgery.  Because of the upcoming amputation, they are working on selling their house and moving to [facility] to be closer to children and specialist.          Objective    BP 94/63   Pulse 92   Temp 97.5  F (36.4  C)   Resp 20   Ht 1.727 m (5' 8\")   Wt 65.3 kg (144 lb)   SpO2 97%   BMI 21.90 kg/m    Body mass index is 21.9 kg/m .  Physical Exam   GENERAL: alert and no distress  EYES: Eyes grossly normal to inspection  NEURO: Normal strength and tone, mentation intact and speech normal  PSYCH: mentation appears normal, affect normal/bright            Signed Electronically by: BRANDI Manning CNP    "

## 2024-08-14 NOTE — PROGRESS NOTES
"Virtual Visit Details    Type of service:  Video Visit   Video Start Time: 10:48 AM  Video End Time:10:56 AM    Originating Location (pt. Location): Home    Distant Location (provider location):  On-site  Platform used for Video Visit: [facility]   Cardiology Clinic Note      HPI:   Mr. 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 He presents via virtual visit for follow up.     He presented to [facility] on 1/11, reported with left-sided/substernal chest pain with exertion that had been occurring for 1 week, decreased at rest. Troponin 22->23, EKG without ischemic changes. He underwent coronary angiogram 1/11/23 showed severe multivessel disease with 80% stenosis in prox-mid LAD and heavily calcified stenosis in mid-RCA. He underwent PCI x1 to LAD, with plan to return for staged PCI. He then underwent staged PCI on 2/16 now s/p MEGHNA x1 to RCA. At that post-cath visit, he reported feeling well with some mild dyspnea that he felt was related to his lung disease.     On 6/21/24, patient contacted the cardiology clinic with complaints of chest discomfort. He reported \"I have been lifting weights and working with my arms I get an ache type feeling in my chest. Also having heart burn. Wake up early morning with it also. Belching up. My doctor here in [place] wanted me 2 write and tell u.\" He reported waking up around 2 am with heartburn. He feels it may be related to the gabapentin he was recently started on for his leg pain. He increased his Prilosec (x2) and started taking Tums. He had not tried nitroglycerin for these symptoms and was encouraged to try it the next time he experiences chest " "discomfort.      Today in clinic, patient reports that he has been having this discomfort in his chest for several months and he notices it when he is lifting weights. The sensation goes away when he stops lifting. He describes the sensation as an ache. He also reports continued chest tightness with deep breathing that has been present since his lung transplant.   He reports that he has noticed an increase in the severity of his heartburn symptoms for a few weeks. It almost always occurs in the middle of the night and is accompanied by \"belching up acid\". He has been taking Tums and eating a piece of bread before bed which he results has really improved his symptoms. He was also instructed to switch from Omeprazole to Pantoprazole, but hasn't been home to his pharmacy to pick it up yet. He reports that his heartburn symptoms are also associated with what he eats. For example, he had [employer] the other night which resulted in an upset stomach and diarrhea.   He denies any exertional symptoms, shortness of breath (beyond baseline level since transplant).   His other concern is fatigue that started after he started taking Gabapentin. He recently stopped taking this medication about 1 week ago as it caused him significant GI distress. He continues to have low energy and is now taking a lot of naps which is unusual for him. Patient's wife also endorses significant stress for the patient as he will likely need his R leg amputated in the near future due to his neuropathy.    Interval History ( 08/14/24 )  Since last visit, patient with an ED visit for possible HOTN. BG was low in ED and patient was discharged safely to home.   On 8/01, patient underwent Exploration of right anterior tibial artery and dorsalis pedis artery as an attempt for peripheral bypass, procedure was aborted as vasculature not amenable to bypass. Patient is therefore scheduled for above knee amputation of RLE on 8/28/24 with plans for angioplasty of LLE " after.     Today he reports he is overall doing better than he was at last visit. He reports the atypical chest discomfort he was previously experiencing has gotten much better and he never ended up needing to take a nitroglycerin. He checks his blood pressures daily and numbers typically run in the 90s-110s, occasionally 130s+ if he eats salty foods the night before.     Today in clinic, he denies chest pain, palpitations, dizziness, syncope, or lower extremity edema.     PAST MEDICAL HISTORY:  Past Medical History:   Diagnosis Date    Acute postoperative pain 2013    Alpha-1-antitrypsin deficiency (H)     Arthritis     Basal cell carcinoma     CMV (cytomegalovirus infection) (H)     Reacttivation 2013 when valcyte held    Coronary artery disease     DVT of upper extremity (deep vein thrombosis) (H) 2013    Nonocclusive thrombosis extending from the right subclavian vein to the right axillary vein,  Segmental occlusion of right basilic vein in the upper arm. 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; construction     Employer: [employer]   Tobacco Use    Smoking status: Former     Current packs/day: 0.00     Average packs/day: 2.0 packs/day for 15.0 years (30.0 ttl pk-yrs)     Types: Cigarettes     Start date: 1971     Quit date: 1986     Years since quittin.6     Passive exposure: Past    Smokeless tobacco: Never   Vaping Use    Vaping status: Never Used   Substance and Sexual Activity    Alcohol use: No     Alcohol/week: 0.0 standard drinks of alcohol    Drug use: No    Sexual activity: Yes     Partners: Female     Social Determinants of Health     Financial Resource Strain: Unknown (3/18/2024)    Financial Resource Strain     Within the past 12 months, have you or your family members you live with been unable to get utilities (heat, electricity) when it was really needed?: Patient declined   Food Insecurity: No Food Insecurity (3/22/2024)    [facility]    Hunger Vital Sign     Worried About Running Out of Food in the Last Year: Never true     Ran Out of Food in the Last Year: Never true   Transportation Needs: No Transportation Needs (3/22/2024)    [facility]    PRAPARE - Transportation     Lack of Transportation (Medical): No     Lack of Transportation (Non-Medical): No   Interpersonal Safety: Low Risk   (8/7/2024)    Interpersonal Safety     Do you feel physically and emotionally safe where you currently live?: Yes     Within the past 12 months, have you been hit, slapped, kicked or otherwise physically hurt by someone?: No     Within the past 12 months, have you been humiliated or emotionally abused in other ways by your partner or ex-partner?: No   Housing Stability: Low Risk  (3/22/2024)    [facility]    [facility]     Retired - What is your living situation today? : I have a steady place to live       CURRENT MEDICATIONS:  Current Outpatient Medications   Medication Sig Dispense Refill    acetaminophen (TYLENOL) 325 MG tablet Take 2 tablets (650 mg) by mouth every 6 hours as needed for mild pain 60 tablet 0    acyclovir (ZOVIRAX) 400 MG tablet TAKE 1 TABLET (400 MG) BY MOUTH 2 TIMES DAILY 60 tablet 3    albuterol (PROAIR HFA/PROVENTIL HFA/VENTOLIN HFA) 108 (90 Base) MCG/ACT inhaler Inhale 1-2 puffs into the lungs every 6 hours as needed for shortness of breath or wheezing 8.5 g 3    amLODIPine (NORVASC) 10 MG tablet TAKE 1 TABLET (10 MG) BY MOUTH DAILY 90 tablet 3    aspirin (ASA) 81 MG EC tablet Take 1 tablet (81 mg) by mouth daily 90 tablet 3    azithromycin (ZITHROMAX) 250 MG tablet Take 250 mg by mouth Every Mon, Wed, Fri Morning      blood glucose (NO BRAND SPECIFIED) test strip USE TO TEST BLOOD SUGAR 3 TIMES DAILY. DIAG CODE: E11.9 300 strip 3    Calcium Carbonate-Vitamin D 600-10 MG-MCG TABS Take 1 tablet by mouth 2 times daily (with meals) 60 tablet 11    carvedilol (COREG) 6.25 MG tablet TAKE 1 TABLET (6.25 MG) BY MOUTH 2 TIMES DAILY (WITH MEALS) 120 tablet 3    clopidogrel (PLAVIX) 75 MG tablet Take 1 tablet (75 mg) by mouth daily 90 tablet 3    dapsone (ACZONE) 25 MG tablet Take 2 tablets (50 mg) by mouth daily 180 tablet 3    diclofenac (VOLTAREN) 1 % topical gel Apply 2 g topically 2 times daily as needed for moderate pain      DULoxetine  (CYMBALTA) 20 MG capsule Take 1 capsule (20 mg) by mouth daily 30 capsule 3    econazole nitrate 1 % external cream APPLY TOPICALLY DAILY TO FEET AND HEELS. 85 g 3    fludrocortisone (FLORINEF) 0.1 MG tablet Take 1 tablet (0.1 mg) by mouth daily 90 tablet 3    fluticasone-salmeterol (ADVAIR-HFA) 230-21 MCG/ACT inhaler Inhale 2 puffs into the lungs 2 times daily 8 g 11    furosemide (LASIX) 20 MG tablet Take 1 tablet (20 mg) by mouth daily 90 tablet 4    HYDROmorphone (DILAUDID) 2 MG tablet Take 0.5-1 tablets (1-2 mg) by mouth every 6 hours as needed for pain 60 tablet 0    insulin aspart (NOVOLOG PEN) 100 UNIT/ML pen Take 5 U am, 3 unit(s) non, 5 unit(s) pm of insulin within 30 minutes of start of breakfast, lunch, and dinner. Do not give if blood sugar is less than 70 mg/dl.      insulin glargine (LANTUS PEN) 100 UNIT/ML pen Inject 18 Units Subcutaneous every morning (before breakfast)      insulin pen needle (32G X 4 MM) 32G X 4 MM miscellaneous Use 4 pen needles daily or as directed. Dispense as insurance allows. Dx. Code: E09.9 400 each 11    Lancet Devices (MICROLET NEXT LANCING DEVICE) MISC USE AS DIRECTED 1 each 3    lisinopril (ZESTRIL) 40 MG tablet TAKE 1 TABLET (40 MG) BY MOUTH DAILY IN THE MORNING 90 tablet 0    loperamide (IMODIUM) 2 MG capsule Take 1 capsule (2 mg) by mouth 4 times daily as needed for diarrhea 120 capsule 12    magnesium oxide (MAG-OX) 400 MG tablet Take 1 tablet (400 mg) by mouth 2 times daily 180 tablet 3    metoclopramide (REGLAN) 5 MG tablet Take 1 tablet (5 mg) by mouth every 6 hours as needed (nausea) 30 tablet 0    Microlet Lancets MISC CHECK BLOOD SUGAR FOUR TIMES DAILY E11.9 400 each 1    montelukast (SINGULAIR) 10 MG tablet Take 1 tablet (10 mg) by mouth every evening 90 tablet 3    multivitamin, therapeutic (THERA-VIT) TABS Take 1 tablet by mouth daily 30 tablet 12    order for DME Equipment being ordered: diabetic shoes 1 each 0    pantoprazole (PROTONIX) 40 MG EC tablet  Take 1 tablet (40 mg) by mouth daily 90 tablet 1    predniSONE (DELTASONE) 5 MG tablet TAKE ONE TABLET BY MOUTH ONCE DAILY IN THE MORNING AND ONE-HALF TABLET IN THE EVENING 45 tablet 12    rosuvastatin (CRESTOR) 40 MG tablet Take 20 mg by mouth Every Mon, Wed, Fri Morning      sildenafil (VIAGRA) 25 MG tablet Take 1 tablet (25 mg) by mouth as needed (as needed) 32 tablet 11    tacrolimus (GENERIC EQUIVALENT) 1 MG capsule Take 3 capsules (3 mg) by mouth every morning AND 3 capsules (3 mg) every evening. Total dose: 3 mg in AM and 3 mg in  capsule 11     No current facility-administered medications for this visit.       ROS:   Refer to HPI    EXAM:  There were no vitals taken for this visit.  No physical exam performed due to nature of video visit.     Labs, reviewed with patient in clinic today:  CBC RESULTS:  Lab Results   Component Value Date    WBC 7.9 08/01/2024    WBC 6.2 06/03/2021    RBC 2.96 (L) 08/01/2024    RBC 3.83 (L) 06/03/2021    HGB 9.7 (L) 08/01/2024    HGB 13.0 (L) 06/03/2021    HCT 31.4 (L) 08/01/2024    HCT 40.1 06/03/2021     (H) 08/01/2024     (H) 06/03/2021    MCH 32.8 08/01/2024    MCH 33.9 (H) 06/03/2021    MCHC 30.9 (L) 08/01/2024    MCHC 32.4 06/03/2021    RDW 16.5 (H) 08/01/2024    RDW 13.1 06/03/2021     08/01/2024     06/03/2021       CMP RESULTS:  Lab Results   Component Value Date     08/01/2024     06/03/2021    POTASSIUM 4.3 08/01/2024    POTASSIUM 4.5 10/26/2022    POTASSIUM 4.6 06/03/2021    CHLORIDE 109 (H) 08/01/2024    CHLORIDE 110 (H) 11/09/2022    CHLORIDE 105 06/03/2021    CO2 22 08/01/2024    CO2 28 10/26/2022    CO2 26 06/03/2021    ANIONGAP 13 08/01/2024    ANIONGAP 6 10/26/2022    ANIONGAP 9 06/03/2021     (H) 08/01/2024    GLC 92 10/26/2022    GLC 96 06/03/2021    BUN 35.1 (H) 08/01/2024    BUN 36 (H) 10/26/2022    BUN 38 (H) 06/03/2021    CR 1.38 (H) 08/01/2024    CR 1.29 06/03/2021    GFRESTIMATED 55 (L) 08/01/2024     "GFRESTIMATED 59 (L) 07/21/2022    GFRESTIMATED 56 (L) 06/03/2021    GFRESTBLACK 67 06/03/2021    ERIN 8.2 (L) 08/01/2024    ERIN 8.9 06/03/2021    BILITOTAL 0.4 07/26/2024    BILITOTAL 0.3 05/12/2021    ALBUMIN 3.3 (L) 07/26/2024    ALBUMIN 3.9 10/12/2022    ALBUMIN 3.8 05/12/2021    ALKPHOS 84 07/26/2024    ALKPHOS 38 05/12/2021    ALT 40 07/26/2024    ALT 55 (H) 05/12/2021    AST 46 (H) 07/26/2024    AST 43 (H) 05/12/2021        INR RESULTS:  Lab Results   Component Value Date    INR 1.03 03/21/2024    INR 1.00 10/30/2020       Lab Results   Component Value Date    MAG 2.1 07/12/2024    MAG 1.8 (L) 06/03/2021     Lab Results   Component Value Date    NTBNPI 212 (H) 02/24/2019     No results found for: \"NTBNP\"    LIPIDS:  Lab Results   Component Value Date    CHOL 146 04/03/2024    CHOL 143 10/30/2020     Lab Results   Component Value Date    HDL 42 04/03/2024    HDL 46 10/30/2020     Lab Results   Component Value Date    LDL 47 04/03/2024    LDL 62 10/30/2020     Lab Results   Component Value Date    TRIG 284 04/03/2024    TRIG 175 10/30/2020     Lab Results   Component Value Date    CHOLHDLRATIO 4.2 10/06/2014       Assessment and Plan:   Mr. 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      # Atypical chest pain - resolved  Patient's symptoms appear most consistent with GERD as they were happening primarily at night, were accompanied by belching, and improved with TUMS. He also appears to have some component of MSK chest discomfort that he only experiences with weight lifting. Today he reports these symptoms rarely occur and he did not ever take nitroglycerin.        # CAD s/p PCI w/ MEGHNA x1 to LAD (1/11/24), PCI w/ MEGHNA x1 to RCA " (2/16/24)  # Dyslipidemia  ED visit 1/11 for exertional chest pain, troponins flat, EKG with no changes. Given risk factors of DM, HLD, and HTN, taken to cath lab for invasive angiogram, which revealed severe multivessel disease. Now s/p MEGHNA x1 to LAD (Synergy XD 3.5 x 24 mm) Now s/p staged PCI of RCA with MEGHNA x1 (3.5 x 20 mm Synergy XD)  Most recent LDL 60  - DAPT: asa 81mg + Plavix 75mg daily for 1 year, then asa 81 monotherapy lifelong   - Rosuvastatin 20mg MWF      # HTN  Well-controlled at home  - cont amlodipine 10mg daily  - carvedilol 6.25mg BID  - lisinopril 40mg every day    Follow up:  1 year  Chart review time today: 5 minutes  Visit time today: 10 minutes  Total time spent today: 15 minutes        Yessenia Pryor PA-C  General Cardiology   08/20/24

## 2024-08-19 NOTE — PROGRESS NOTES
PREOPERATIVE CALL QUESTIONS    Have you had any recent infections, illnesses, unusual symptoms that you know of? (Please review different areas of the body) No    Confirm surgery date/time/location and pt transportation plans: Yes    Confirm preop exam is done/scheduled: Yes, done 7/31/2024    Are you taking any blood thinners still? If so what are they? When did you stop your blood thinners? Yes, Plavix & Asprin as advised.     Confirm NPO instructions.  Our policy is no eating 8 hours before your surgery! Please note if this is not followed, your surgery will be cancelled. Also you may have clear liquids up to 2 hours before surgery. This is ONLY BLACK TEA OR COFFEE, GATORADE TYPE DRINKS, AND WATER. YOU CANNOT ADD SUGAR OR CREAM TO ANYTHING OR YOUR SURGERY WILL BE CANCELLED.

## 2024-08-20 ENCOUNTER — VIRTUAL VISIT (OUTPATIENT)
Dept: CARDIOLOGY | Facility: CLINIC | Age: 69
End: 2024-08-20
Payer: MEDICARE

## 2024-08-20 VITALS
HEART RATE: 98 BPM | BODY MASS INDEX: 21.82 KG/M2 | DIASTOLIC BLOOD PRESSURE: 70 MMHG | WEIGHT: 144 LBS | HEIGHT: 68 IN | SYSTOLIC BLOOD PRESSURE: 130 MMHG

## 2024-08-20 DIAGNOSIS — I25.110 CORONARY ARTERY DISEASE INVOLVING NATIVE CORONARY ARTERY OF NATIVE HEART WITH UNSTABLE ANGINA PECTORIS (H): Primary | ICD-10-CM

## 2024-08-20 DIAGNOSIS — E78.5 HYPERLIPIDEMIA LDL GOAL <70: ICD-10-CM

## 2024-08-20 PROCEDURE — 99212 OFFICE O/P EST SF 10 MIN: CPT | Mod: 95

## 2024-08-20 ASSESSMENT — PAIN SCALES - GENERAL: PAINLEVEL: EXTREME PAIN (8)

## 2024-08-20 NOTE — PATIENT INSTRUCTIONS
Take your medicines every day, as directed     Changes made today:  No medication  changes        Cardiology Care Coordinators:      Leanne AVALOS RN     Cardiology Rooming staff:  Belkis JI    Phone  [phone]      Fax [phone]    To Contact us     During Business Hours:  [phone]     If you are needing refills please contact your pharmacy.     For urgent after hour care please call the [facility] at [phone] or the [facility] at [phone] and ask to speak to the cardiologist on call.            HOW TO CHECK YOUR BLOOD PRESSURE AT HOME:     Avoid eating, smoking, and exercising for at least 30 minutes before taking a reading.     Be sure you have taken your BP medication at least 2-3 hours before you check it.      Sit quietly for 10 minutes before a reading.      Sit in a chair with your feet flat on the floor. Rest your  arm on a table so that the arm cuff is at the same level as your heart.     Remain still during the reading.  Record your blood pressure and pulse in a log and bring to your next appointment.       Use [employer] allows you to communicate directly with your heart team through secure messaging.  [employer] can be accessed any time on your phone, computer, or tablet.  If you need assistance, we'd be happy to help!             Keep your Heart Appointments:     Follow-up in 1 year

## 2024-08-20 NOTE — NURSING NOTE
Current patient location:Pt is at home per pt   [address]  [place] [address]    Is the patient currently in the state of [place]? YES    Visit mode:VIDEO    If the visit is dropped, the patient can be reconnected by: VIDEO VISIT: Text to cell phone:   Telephone Information:   Mobile [phone]       Will anyone else be joining the visit? Yes, pt's wife is with th eot and will be joining the video visit per pt  (If patient encounters technical issues they should call [phone])    How would you like to obtain your AVS? MyChart    Are changes needed to the allergy or medication list? Pt stated no med changes    Are refills needed on medications prescribed by this physician? NO    Rooming Documentation:  Questionnaire(s) not pre-assigned      Reason for visit: RECHECK    No other vitals to report per pt    Frida HELTONF

## 2024-08-20 NOTE — NURSING NOTE
Med Reconcile: Reviewed and verified all current medications with the patient. The updated medication list was printed and given to the patient./ No medication changes.     Return Appointment  -Follow-up in 1 year

## 2024-08-22 NOTE — PROGRESS NOTES
Surgery Update    Per Dr. Sneed case needs to be moved to 8/29/2024.     Spoke to the patient, pt confirms new surgery and time.Pt declined wanting a new surgery packet sent.     Date: 8/29/2024    Time: 1:00pm    Location: Location: [facility]:  [address] [place] [address] (phone: [phone], Fax: [phone])    Please park in [address]. Enter through the main entrance. Check in at the Welcome Desk and you will be directed to the surgery unit.     Spoke to Sobia in surgery scheduling at 12:48pm. Surgery has been moved.

## 2024-08-25 ENCOUNTER — HOSPITAL ENCOUNTER (INPATIENT)
Facility: HOSPITAL | Age: 69
LOS: 10 days | Discharge: NURSING, REHAB OR LONG-TERM CARE | DRG: 856 | End: 2024-09-04
Attending: EMERGENCY MEDICINE | Admitting: INTERNAL MEDICINE
Payer: MEDICARE

## 2024-08-25 DIAGNOSIS — I73.9 PAD (PERIPHERAL ARTERY DISEASE) (H): ICD-10-CM

## 2024-08-25 DIAGNOSIS — T81.30XA WOUND DEHISCENCE: ICD-10-CM

## 2024-08-25 DIAGNOSIS — Z94.2 S/P LUNG TRANSPLANT (H): Primary | Chronic | ICD-10-CM

## 2024-08-25 DIAGNOSIS — R19.7 DIARRHEA, UNSPECIFIED TYPE: ICD-10-CM

## 2024-08-25 DIAGNOSIS — A41.9 SEPSIS (H): ICD-10-CM

## 2024-08-25 DIAGNOSIS — M79.671 RIGHT FOOT PAIN: ICD-10-CM

## 2024-08-25 DIAGNOSIS — E83.42 HYPOMAGNESEMIA: ICD-10-CM

## 2024-08-25 DIAGNOSIS — N17.9 AKI (ACUTE KIDNEY INJURY) (H): ICD-10-CM

## 2024-08-25 DIAGNOSIS — S91.109D OPEN WOUND OF TOE, SUBSEQUENT ENCOUNTER: ICD-10-CM

## 2024-08-25 LAB
ANION GAP SERPL CALCULATED.3IONS-SCNC: 13 MMOL/L (ref 7–15)
BUN SERPL-MCNC: 46.5 MG/DL (ref 8–23)
CALCIUM SERPL-MCNC: 8.5 MG/DL (ref 8.8–10.4)
CHLORIDE SERPL-SCNC: 105 MMOL/L (ref 98–107)
CREAT SERPL-MCNC: 1.85 MG/DL (ref 0.67–1.17)
CRP SERPL-MCNC: 82.3 MG/L
EGFRCR SERPLBLD CKD-EPI 2021: 38 ML/MIN/1.73M2
ERYTHROCYTE [DISTWIDTH] IN BLOOD BY AUTOMATED COUNT: 14.7 % (ref 10–15)
ERYTHROCYTE [SEDIMENTATION RATE] IN BLOOD BY WESTERGREN METHOD: 52 MM/HR (ref 0–20)
GLUCOSE BLDC GLUCOMTR-MCNC: 104 MG/DL (ref 70–99)
GLUCOSE SERPL-MCNC: 153 MG/DL (ref 70–99)
HBA1C MFR BLD: 4.2 %
HCO3 SERPL-SCNC: 23 MMOL/L (ref 22–29)
HCT VFR BLD AUTO: 34 % (ref 40–53)
HGB BLD-MCNC: 10.5 G/DL (ref 13.3–17.7)
HOLD SPECIMEN: NORMAL
HOLD SPECIMEN: NORMAL
LACTATE SERPL-SCNC: 1.4 MMOL/L (ref 0.7–2)
LACTATE SERPL-SCNC: 2.2 MMOL/L (ref 0.7–2)
MCH RBC QN AUTO: 32.2 PG (ref 26.5–33)
MCHC RBC AUTO-ENTMCNC: 30.9 G/DL (ref 31.5–36.5)
MCV RBC AUTO: 104 FL (ref 78–100)
PLATELET # BLD AUTO: 359 10E3/UL (ref 150–450)
POTASSIUM SERPL-SCNC: 5.4 MMOL/L (ref 3.4–5.3)
RBC # BLD AUTO: 3.26 10E6/UL (ref 4.4–5.9)
SODIUM SERPL-SCNC: 141 MMOL/L (ref 135–145)
WBC # BLD AUTO: 8.3 10E3/UL (ref 4–11)

## 2024-08-25 PROCEDURE — 99221 1ST HOSP IP/OBS SF/LOW 40: CPT | Mod: GC | Performed by: SURGERY

## 2024-08-25 PROCEDURE — 96375 TX/PRO/DX INJ NEW DRUG ADDON: CPT

## 2024-08-25 PROCEDURE — 96365 THER/PROPH/DIAG IV INF INIT: CPT | Mod: 59

## 2024-08-25 PROCEDURE — 85027 COMPLETE CBC AUTOMATED: CPT

## 2024-08-25 PROCEDURE — 250N000011 HC RX IP 250 OP 636: Performed by: EMERGENCY MEDICINE

## 2024-08-25 PROCEDURE — 83036 HEMOGLOBIN GLYCOSYLATED A1C: CPT

## 2024-08-25 PROCEDURE — 87205 SMEAR GRAM STAIN: CPT

## 2024-08-25 PROCEDURE — 250N000013 HC RX MED GY IP 250 OP 250 PS 637

## 2024-08-25 PROCEDURE — 87040 BLOOD CULTURE FOR BACTERIA: CPT

## 2024-08-25 PROCEDURE — 36415 COLL VENOUS BLD VENIPUNCTURE: CPT

## 2024-08-25 PROCEDURE — 120N000001 HC R&B MED SURG/OB

## 2024-08-25 PROCEDURE — 96367 TX/PROPH/DG ADDL SEQ IV INF: CPT

## 2024-08-25 PROCEDURE — 99223 1ST HOSP IP/OBS HIGH 75: CPT | Mod: AI | Performed by: INTERNAL MEDICINE

## 2024-08-25 PROCEDURE — 99207 PR APP CREDIT; MD BILLING SHARED VISIT: CPT

## 2024-08-25 PROCEDURE — 250N000011 HC RX IP 250 OP 636

## 2024-08-25 PROCEDURE — 258N000003 HC RX IP 258 OP 636

## 2024-08-25 PROCEDURE — 86140 C-REACTIVE PROTEIN: CPT

## 2024-08-25 PROCEDURE — 80048 BASIC METABOLIC PNL TOTAL CA: CPT

## 2024-08-25 PROCEDURE — 99285 EMERGENCY DEPT VISIT HI MDM: CPT | Mod: 25

## 2024-08-25 PROCEDURE — 250N000012 HC RX MED GY IP 250 OP 636 PS 637

## 2024-08-25 PROCEDURE — 83605 ASSAY OF LACTIC ACID: CPT

## 2024-08-25 PROCEDURE — 96366 THER/PROPH/DIAG IV INF ADDON: CPT

## 2024-08-25 PROCEDURE — 258N000003 HC RX IP 258 OP 636: Performed by: EMERGENCY MEDICINE

## 2024-08-25 PROCEDURE — 85652 RBC SED RATE AUTOMATED: CPT

## 2024-08-25 RX ORDER — PREDNISONE 5 MG/1
5 TABLET ORAL EVERY MORNING
Status: DISCONTINUED | OUTPATIENT
Start: 2024-08-26 | End: 2024-08-30

## 2024-08-25 RX ORDER — CLOPIDOGREL BISULFATE 75 MG/1
75 TABLET ORAL DAILY
Status: DISCONTINUED | OUTPATIENT
Start: 2024-08-26 | End: 2024-09-04 | Stop reason: HOSPADM

## 2024-08-25 RX ORDER — CALCIUM CARBONATE 500 MG/1
1000 TABLET, CHEWABLE ORAL 4 TIMES DAILY PRN
Status: DISCONTINUED | OUTPATIENT
Start: 2024-08-25 | End: 2024-09-04 | Stop reason: HOSPADM

## 2024-08-25 RX ORDER — CEFAZOLIN SODIUM 1 G/50ML
1250 SOLUTION INTRAVENOUS ONCE
Status: COMPLETED | OUTPATIENT
Start: 2024-08-25 | End: 2024-08-25

## 2024-08-25 RX ORDER — AMOXICILLIN 250 MG
2 CAPSULE ORAL 2 TIMES DAILY PRN
Status: DISCONTINUED | OUTPATIENT
Start: 2024-08-25 | End: 2024-09-04 | Stop reason: HOSPADM

## 2024-08-25 RX ORDER — PANTOPRAZOLE SODIUM 40 MG/1
40 TABLET, DELAYED RELEASE ORAL DAILY
Status: DISCONTINUED | OUTPATIENT
Start: 2024-08-26 | End: 2024-09-04 | Stop reason: HOSPADM

## 2024-08-25 RX ORDER — METOCLOPRAMIDE 5 MG/1
5 TABLET ORAL EVERY 6 HOURS PRN
Status: DISCONTINUED | OUTPATIENT
Start: 2024-08-25 | End: 2024-08-29

## 2024-08-25 RX ORDER — PREDNISONE 5 MG/1
2.5 TABLET ORAL EVERY EVENING
COMMUNITY

## 2024-08-25 RX ORDER — NICOTINE POLACRILEX 4 MG/1
20 GUM, CHEWING ORAL 2 TIMES DAILY
COMMUNITY
End: 2024-09-20

## 2024-08-25 RX ORDER — MULTIVITAMIN,THERAPEUTIC
1 TABLET ORAL DAILY
Status: DISCONTINUED | OUTPATIENT
Start: 2024-08-26 | End: 2024-09-04 | Stop reason: HOSPADM

## 2024-08-25 RX ORDER — PIPERACILLIN SODIUM, TAZOBACTAM SODIUM 3; .375 G/15ML; G/15ML
3.38 INJECTION, POWDER, LYOPHILIZED, FOR SOLUTION INTRAVENOUS ONCE
Status: COMPLETED | OUTPATIENT
Start: 2024-08-25 | End: 2024-08-25

## 2024-08-25 RX ORDER — FLUTICASONE FUROATE AND VILANTEROL 100; 25 UG/1; UG/1
1 POWDER RESPIRATORY (INHALATION) DAILY
Status: DISCONTINUED | OUTPATIENT
Start: 2024-08-26 | End: 2024-09-04 | Stop reason: HOSPADM

## 2024-08-25 RX ORDER — LOPERAMIDE HCL 2 MG
2 CAPSULE ORAL 4 TIMES DAILY PRN
Status: DISCONTINUED | OUTPATIENT
Start: 2024-08-25 | End: 2024-09-04 | Stop reason: HOSPADM

## 2024-08-25 RX ORDER — ONDANSETRON 2 MG/ML
4 INJECTION INTRAMUSCULAR; INTRAVENOUS ONCE
Status: COMPLETED | OUTPATIENT
Start: 2024-08-25 | End: 2024-08-25

## 2024-08-25 RX ORDER — NICOTINE POLACRILEX 4 MG
15-30 LOZENGE BUCCAL
Status: DISCONTINUED | OUTPATIENT
Start: 2024-08-25 | End: 2024-08-28

## 2024-08-25 RX ORDER — NALOXONE HYDROCHLORIDE 0.4 MG/ML
0.4 INJECTION, SOLUTION INTRAMUSCULAR; INTRAVENOUS; SUBCUTANEOUS
Status: DISCONTINUED | OUTPATIENT
Start: 2024-08-25 | End: 2024-09-04 | Stop reason: HOSPADM

## 2024-08-25 RX ORDER — TACROLIMUS 1 MG/1
3 CAPSULE ORAL EVERY MORNING
Status: DISCONTINUED | OUTPATIENT
Start: 2024-08-26 | End: 2024-08-29

## 2024-08-25 RX ORDER — ACYCLOVIR 400 MG/1
400 TABLET ORAL 2 TIMES DAILY
Status: DISCONTINUED | OUTPATIENT
Start: 2024-08-25 | End: 2024-09-04 | Stop reason: HOSPADM

## 2024-08-25 RX ORDER — FUROSEMIDE 20 MG
20 TABLET ORAL DAILY
Status: DISCONTINUED | OUTPATIENT
Start: 2024-08-26 | End: 2024-09-03

## 2024-08-25 RX ORDER — ROSUVASTATIN CALCIUM 10 MG/1
20 TABLET, COATED ORAL
Status: DISCONTINUED | OUTPATIENT
Start: 2024-08-26 | End: 2024-09-04 | Stop reason: HOSPADM

## 2024-08-25 RX ORDER — NALOXONE HYDROCHLORIDE 0.4 MG/ML
0.2 INJECTION, SOLUTION INTRAMUSCULAR; INTRAVENOUS; SUBCUTANEOUS
Status: DISCONTINUED | OUTPATIENT
Start: 2024-08-25 | End: 2024-09-04 | Stop reason: HOSPADM

## 2024-08-25 RX ORDER — DAPSONE 25 MG/1
50 TABLET ORAL DAILY
Status: DISCONTINUED | OUTPATIENT
Start: 2024-08-26 | End: 2024-09-04 | Stop reason: HOSPADM

## 2024-08-25 RX ORDER — DEXTROSE MONOHYDRATE 25 G/50ML
25-50 INJECTION, SOLUTION INTRAVENOUS
Status: DISCONTINUED | OUTPATIENT
Start: 2024-08-25 | End: 2024-08-28

## 2024-08-25 RX ORDER — PREDNISONE 2.5 MG/1
2.5 TABLET ORAL EVERY EVENING
Status: DISCONTINUED | OUTPATIENT
Start: 2024-08-25 | End: 2024-09-04 | Stop reason: HOSPADM

## 2024-08-25 RX ORDER — MAGNESIUM OXIDE 400 MG/1
400 TABLET ORAL 2 TIMES DAILY
Status: DISCONTINUED | OUTPATIENT
Start: 2024-08-25 | End: 2024-08-30

## 2024-08-25 RX ORDER — LIDOCAINE 40 MG/G
CREAM TOPICAL
Status: DISCONTINUED | OUTPATIENT
Start: 2024-08-25 | End: 2024-09-04 | Stop reason: HOSPADM

## 2024-08-25 RX ORDER — LISINOPRIL 20 MG/1
40 TABLET ORAL DAILY
Status: DISCONTINUED | OUTPATIENT
Start: 2024-08-26 | End: 2024-09-02

## 2024-08-25 RX ORDER — FLUDROCORTISONE ACETATE 0.1 MG/1
0.1 TABLET ORAL DAILY
Status: DISCONTINUED | OUTPATIENT
Start: 2024-08-26 | End: 2024-09-04 | Stop reason: HOSPADM

## 2024-08-25 RX ORDER — CEFAZOLIN SODIUM 1 G/50ML
750 SOLUTION INTRAVENOUS EVERY 24 HOURS
Status: DISCONTINUED | OUTPATIENT
Start: 2024-08-26 | End: 2024-08-26

## 2024-08-25 RX ORDER — ALBUTEROL SULFATE 90 UG/1
1-2 AEROSOL, METERED RESPIRATORY (INHALATION) EVERY 6 HOURS PRN
Status: DISCONTINUED | OUTPATIENT
Start: 2024-08-25 | End: 2024-09-04 | Stop reason: HOSPADM

## 2024-08-25 RX ORDER — AMLODIPINE BESYLATE 5 MG/1
10 TABLET ORAL DAILY
Status: DISCONTINUED | OUTPATIENT
Start: 2024-08-26 | End: 2024-08-31

## 2024-08-25 RX ORDER — PIPERACILLIN SODIUM, TAZOBACTAM SODIUM 3; .375 G/15ML; G/15ML
3.38 INJECTION, POWDER, LYOPHILIZED, FOR SOLUTION INTRAVENOUS EVERY 8 HOURS
Status: COMPLETED | OUTPATIENT
Start: 2024-08-25 | End: 2024-09-01

## 2024-08-25 RX ORDER — NICOTINE POLACRILEX 4 MG/1
20 GUM, CHEWING ORAL 2 TIMES DAILY
Status: DISCONTINUED | OUTPATIENT
Start: 2024-08-25 | End: 2024-08-25

## 2024-08-25 RX ORDER — PREDNISONE 5 MG/1
5 TABLET ORAL EVERY MORNING
COMMUNITY

## 2024-08-25 RX ORDER — AMOXICILLIN 250 MG
1 CAPSULE ORAL 2 TIMES DAILY PRN
Status: DISCONTINUED | OUTPATIENT
Start: 2024-08-25 | End: 2024-09-04 | Stop reason: HOSPADM

## 2024-08-25 RX ORDER — CARVEDILOL 3.12 MG/1
6.25 TABLET ORAL 2 TIMES DAILY WITH MEALS
Status: DISCONTINUED | OUTPATIENT
Start: 2024-08-25 | End: 2024-09-01

## 2024-08-25 RX ORDER — ASPIRIN 81 MG/1
81 TABLET ORAL DAILY
Status: DISCONTINUED | OUTPATIENT
Start: 2024-08-26 | End: 2024-09-04 | Stop reason: HOSPADM

## 2024-08-25 RX ORDER — ACETAMINOPHEN 325 MG/1
650 TABLET ORAL EVERY 6 HOURS PRN
Status: DISCONTINUED | OUTPATIENT
Start: 2024-08-25 | End: 2024-09-01

## 2024-08-25 RX ORDER — SODIUM CHLORIDE 9 MG/ML
INJECTION, SOLUTION INTRAVENOUS CONTINUOUS
Status: DISCONTINUED | OUTPATIENT
Start: 2024-08-25 | End: 2024-08-26

## 2024-08-25 RX ORDER — TACROLIMUS 1 MG/1
3 CAPSULE ORAL EVERY EVENING
Status: DISCONTINUED | OUTPATIENT
Start: 2024-08-25 | End: 2024-08-29

## 2024-08-25 RX ADMIN — ONDANSETRON 4 MG: 2 INJECTION INTRAMUSCULAR; INTRAVENOUS at 16:44

## 2024-08-25 RX ADMIN — TACROLIMUS 3 MG: 1 CAPSULE ORAL at 22:43

## 2024-08-25 RX ADMIN — CARVEDILOL 6.25 MG: 6.25 TABLET, FILM COATED ORAL at 22:43

## 2024-08-25 RX ADMIN — Medication 1 TABLET: at 21:18

## 2024-08-25 RX ADMIN — SODIUM CHLORIDE: 9 INJECTION, SOLUTION INTRAVENOUS at 20:59

## 2024-08-25 RX ADMIN — PREDNISONE 2.5 MG: 2.5 TABLET ORAL at 22:43

## 2024-08-25 RX ADMIN — MAGNESIUM OXIDE TAB 400 MG (241.3 MG ELEMENTAL MG) 400 MG: 400 (241.3 MG) TAB at 21:18

## 2024-08-25 RX ADMIN — PIPERACILLIN AND TAZOBACTAM 3.38 G: 3; .375 INJECTION, POWDER, FOR SOLUTION INTRAVENOUS at 22:43

## 2024-08-25 RX ADMIN — SODIUM CHLORIDE 1250 MG: 9 INJECTION, SOLUTION INTRAVENOUS at 17:31

## 2024-08-25 RX ADMIN — HYDROMORPHONE HYDROCHLORIDE 0.5 MG: 1 INJECTION, SOLUTION INTRAMUSCULAR; INTRAVENOUS; SUBCUTANEOUS at 17:06

## 2024-08-25 RX ADMIN — HYDROMORPHONE HYDROCHLORIDE 1 MG: 2 TABLET ORAL at 21:17

## 2024-08-25 RX ADMIN — PIPERACILLIN AND TAZOBACTAM 3.38 G: 3; .375 INJECTION, POWDER, FOR SOLUTION INTRAVENOUS at 16:43

## 2024-08-25 RX ADMIN — SODIUM CHLORIDE 1000 ML: 9 INJECTION, SOLUTION INTRAVENOUS at 16:46

## 2024-08-25 RX ADMIN — ACYCLOVIR 400 MG: 400 TABLET ORAL at 22:43

## 2024-08-25 RX ADMIN — ACETAMINOPHEN 650 MG: 325 TABLET ORAL at 21:16

## 2024-08-25 ASSESSMENT — COLUMBIA-SUICIDE SEVERITY RATING SCALE - C-SSRS
2. HAVE YOU ACTUALLY HAD ANY THOUGHTS OF KILLING YOURSELF IN THE PAST MONTH?: NO
1. IN THE PAST MONTH, HAVE YOU WISHED YOU WERE DEAD OR WISHED YOU COULD GO TO SLEEP AND NOT WAKE UP?: NO
6. HAVE YOU EVER DONE ANYTHING, STARTED TO DO ANYTHING, OR PREPARED TO DO ANYTHING TO END YOUR LIFE?: NO

## 2024-08-25 ASSESSMENT — ACTIVITIES OF DAILY LIVING (ADL)
ADLS_ACUITY_SCORE: 36
ADLS_ACUITY_SCORE: 33
ADLS_ACUITY_SCORE: 35
ADLS_ACUITY_SCORE: 36
ADLS_ACUITY_SCORE: 35
ADLS_ACUITY_SCORE: 35
ADLS_ACUITY_SCORE: 36
DEPENDENT_IADLS:: INDEPENDENT
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
## 2024-08-25 NOTE — MEDICATION SCRIBE - ADMISSION MEDICATION HISTORY
Medication Scribe Admission Medication History    Admission medication history is complete. The information provided in this note is only as accurate as the sources available at the time of the update.    Information Source(s): Patient via in-person    Pertinent Information: patient reports self management of medications.     Changes made to PTA medication list:  Added: Iron, copper, Omeprazole  Deleted: Cymbalta (per patient)  Changed: None    Allergies reviewed with patient and updates made in EHR: yes    Medication History Completed By: Sandoval Leon 8/25/2024 5:34 PM    PTA Med List   Medication Sig Last Dose    acetaminophen (TYLENOL) 325 MG tablet Take 2 tablets (650 mg) by mouth every 6 hours as needed for mild pain Past Month at prn    acyclovir (ZOVIRAX) 400 MG tablet TAKE 1 TABLET (400 MG) BY MOUTH 2 TIMES DAILY 8/25/2024 at am    albuterol (PROAIR HFA/PROVENTIL HFA/VENTOLIN HFA) 108 (90 Base) MCG/ACT inhaler Inhale 1-2 puffs into the lungs every 6 hours as needed for shortness of breath or wheezing Past Month at prn    amLODIPine (NORVASC) 10 MG tablet TAKE 1 TABLET (10 MG) BY MOUTH DAILY 8/25/2024 at am    aspirin (ASA) 81 MG EC tablet Take 1 tablet (81 mg) by mouth daily 8/25/2024 at am    azithromycin (ZITHROMAX) 250 MG tablet Take 250 mg by mouth Every Mon, Wed, Fri Morning 8/23/2024 at am    Calcium Carbonate-Vitamin D 600-10 MG-MCG TABS Take 1 tablet by mouth 2 times daily (with meals) 8/25/2024 at am    carvedilol (COREG) 6.25 MG tablet TAKE 1 TABLET (6.25 MG) BY MOUTH 2 TIMES DAILY (WITH MEALS) 8/25/2024 at am    clopidogrel (PLAVIX) 75 MG tablet Take 1 tablet (75 mg) by mouth daily 8/25/2024 at am    COPPER PO Take 1 tablet by mouth daily. 8/25/2024 at am    dapsone (ACZONE) 25 MG tablet Take 2 tablets (50 mg) by mouth daily 8/25/2024 at am    diclofenac (VOLTAREN) 1 % topical gel Apply 2 g topically 2 times daily as needed for moderate pain Past Month at prn    econazole nitrate 1 % external cream  APPLY TOPICALLY DAILY TO FEET AND HEELS. 8/25/2024 at am    Ferrous Sulfate Dried (HIGH POTENCY IRON) 65 MG TABS Take 1 tablet by mouth every morning. 8/25/2024 at am    fludrocortisone (FLORINEF) 0.1 MG tablet Take 1 tablet (0.1 mg) by mouth daily 8/25/2024 at am    fluticasone-salmeterol (ADVAIR-HFA) 230-21 MCG/ACT inhaler Inhale 2 puffs into the lungs 2 times daily 8/25/2024 at am    furosemide (LASIX) 20 MG tablet Take 1 tablet (20 mg) by mouth daily 8/25/2024 at am    HYDROmorphone (DILAUDID) 2 MG tablet Take 0.5-1 tablets (1-2 mg) by mouth every 6 hours as needed for pain 8/25/2024 at 1200 (2 mg)    insulin aspart (NOVOLOG PEN) 100 UNIT/ML pen Take 5 U am, 3 unit(s) non, 5 unit(s) pm of insulin within 30 minutes of start of breakfast, lunch, and dinner. Do not give if blood sugar is less than 70 mg/dl. 8/25/2024 at am (5 units)    insulin glargine (LANTUS PEN) 100 UNIT/ML pen Inject 18 Units Subcutaneous every morning (before breakfast) 8/25/2024 at am    lisinopril (ZESTRIL) 40 MG tablet TAKE 1 TABLET (40 MG) BY MOUTH DAILY IN THE MORNING 8/25/2024 at am    loperamide (IMODIUM) 2 MG capsule Take 1 capsule (2 mg) by mouth 4 times daily as needed for diarrhea Past Month at prn    magnesium oxide (MAG-OX) 400 MG tablet Take 1 tablet (400 mg) by mouth 2 times daily 8/25/2024 at am    metoclopramide (REGLAN) 5 MG tablet Take 1 tablet (5 mg) by mouth every 6 hours as needed (nausea) Past Week at prn    montelukast (SINGULAIR) 10 MG tablet Take 1 tablet (10 mg) by mouth every evening 8/24/2024 at pm    multivitamin, therapeutic (THERA-VIT) TABS Take 1 tablet by mouth daily 8/25/2024 at am    omeprazole 20 MG tablet Take 20 mg by mouth 2 times daily. 8/25/2024 at am    pantoprazole (PROTONIX) 40 MG EC tablet Take 1 tablet (40 mg) by mouth daily 8/25/2024 at am    predniSONE (DELTASONE) 5 MG tablet Take 5 mg by mouth every morning. In addition, take one half tablet (2.5 mg) in the evening 8/25/2024 at am    predniSONE  (DELTASONE) 5 MG tablet Take 2.5 mg by mouth every evening. In addition, take 1 tablet (5 mg) in the morning. 8/24/2024 at pm    rosuvastatin (CRESTOR) 40 MG tablet Take 20 mg by mouth Every Mon, Wed, Fri Morning 8/23/2024 at am    sildenafil (VIAGRA) 25 MG tablet Take 1 tablet (25 mg) by mouth as needed (as needed) Unknown at prn    tacrolimus (GENERIC EQUIVALENT) 1 MG capsule Take 3 capsules (3 mg) by mouth every morning AND 3 capsules (3 mg) every evening. Total dose: 3 mg in AM and 3 mg in PM 8/25/2024 at am

## 2024-08-25 NOTE — ED PROVIDER NOTES
Emergency Department Staff Note  I had a face to face encounter with this patient seen by the Advanced Practice Provider (MEETA).  I personally made/approved the management plan and take responsibility for the patient management. I personally saw the patient and performed a substantive portion of the visit including all aspects of the medical decision making.      ED Course as of 08/25/24 1827   Sun Aug 25, 2024   0384 I discussed the case with Lauren Landers PA-C.   1811 Patient is a pleasant 69-year-old male comes in today for evaluation of a poorly healing wound to his right lower leg as well as his big toe and small toe on the right side.  He was supposed to have an attempted bypass and it was not able to be done because he has too many calcified vessels.  The plan was for a below the knee amputation in 4 days.  Dr. Delatorre was out of town which was why there was some of the delay.  Patient comes in with worsening pain and continued poor healing.  Lab work was obtained which does show an elevated lactic acid, elevated CRP but normal white count.  We started the patient on vancomycin and Zosyn and we will get him admitted to the hospital.  Will put a call out to vascular surgery for further recommendations.           [facility] EMERGENCY DEPARTMENT  MD Carmelita Coleman, Rakel Garcia MD  08/25/24 1827

## 2024-08-25 NOTE — ED TRIAGE NOTES
Patient presents from home with c/o possible wound infection of right lower leg.  Pt had recent bypass surgery, to have AKA right lower extremity on 8/29.  Pt came to ED today due to increased pain.

## 2024-08-25 NOTE — PHARMACY-VANCOMYCIN DOSING SERVICE
Pharmacy Vancomycin Initial Note  Date of Service 2024  Patient's  1955  69 year old, male    Indication: Sepsis and Skin and Soft Tissue Infection    Current estimated CrCl = Estimated Creatinine Clearance: 45.1 mL/min (A) (based on SCr of 1.38 mg/dL (H)).    Creatinine for last 3 days  No results found for requested labs within last 3 days.    Recent Vancomycin Level(s) for last 3 days  No results found for requested labs within last 3 days.      Vancomycin IV Administrations (past 72 hours)        No vancomycin orders with administrations in past 72 hours.                    Nephrotoxins and other renal medications (From now, onward)      Start     Dose/Rate Route Frequency Ordered Stop    24 1700  vancomycin (VANCOCIN) 1,250 mg in sodium chloride 0.9 % 250 mL intermittent infusion         1,250 mg  over 90 Minutes Intravenous ONCE 24 1634      24 1630  piperacillin-tazobactam (ZOSYN) 3.375 g vial to attach to  mL bag         3.375 g  over 30 Minutes Intravenous ONCE 24 1625              Contrast Orders - past 72 hours (72h ago, onward)      None                  Plan:  Start vancomycin  1250 mg IV once.   Please consult pharmacy again if you would like to continue inpatient     SHAINA SAWANT RPH

## 2024-08-26 LAB
ANION GAP SERPL CALCULATED.3IONS-SCNC: 9 MMOL/L (ref 7–15)
BUN SERPL-MCNC: 33.4 MG/DL (ref 8–23)
CALCIUM SERPL-MCNC: 8.1 MG/DL (ref 8.8–10.4)
CHLORIDE SERPL-SCNC: 105 MMOL/L (ref 98–107)
CREAT SERPL-MCNC: 1.11 MG/DL (ref 0.67–1.17)
EGFRCR SERPLBLD CKD-EPI 2021: 71 ML/MIN/1.73M2
ERYTHROCYTE [DISTWIDTH] IN BLOOD BY AUTOMATED COUNT: 14.6 % (ref 10–15)
GLUCOSE BLDC GLUCOMTR-MCNC: 102 MG/DL (ref 70–99)
GLUCOSE BLDC GLUCOMTR-MCNC: 105 MG/DL (ref 70–99)
GLUCOSE BLDC GLUCOMTR-MCNC: 132 MG/DL (ref 70–99)
GLUCOSE BLDC GLUCOMTR-MCNC: 175 MG/DL (ref 70–99)
GLUCOSE BLDC GLUCOMTR-MCNC: 68 MG/DL (ref 70–99)
GLUCOSE BLDC GLUCOMTR-MCNC: 97 MG/DL (ref 70–99)
GLUCOSE SERPL-MCNC: 85 MG/DL (ref 70–99)
HCO3 SERPL-SCNC: 25 MMOL/L (ref 22–29)
HCT VFR BLD AUTO: 31.4 % (ref 40–53)
HGB BLD-MCNC: 9.6 G/DL (ref 13.3–17.7)
MCH RBC QN AUTO: 31.6 PG (ref 26.5–33)
MCHC RBC AUTO-ENTMCNC: 30.6 G/DL (ref 31.5–36.5)
MCV RBC AUTO: 103 FL (ref 78–100)
PLATELET # BLD AUTO: 279 10E3/UL (ref 150–450)
POTASSIUM SERPL-SCNC: 5.2 MMOL/L (ref 3.4–5.3)
RBC # BLD AUTO: 3.04 10E6/UL (ref 4.4–5.9)
SODIUM SERPL-SCNC: 139 MMOL/L (ref 135–145)
WBC # BLD AUTO: 8.2 10E3/UL (ref 4–11)

## 2024-08-26 PROCEDURE — 99223 1ST HOSP IP/OBS HIGH 75: CPT | Performed by: INTERNAL MEDICINE

## 2024-08-26 PROCEDURE — 258N000003 HC RX IP 258 OP 636: Performed by: STUDENT IN AN ORGANIZED HEALTH CARE EDUCATION/TRAINING PROGRAM

## 2024-08-26 PROCEDURE — 99233 SBSQ HOSP IP/OBS HIGH 50: CPT | Performed by: STUDENT IN AN ORGANIZED HEALTH CARE EDUCATION/TRAINING PROGRAM

## 2024-08-26 PROCEDURE — 85027 COMPLETE CBC AUTOMATED: CPT

## 2024-08-26 PROCEDURE — 80048 BASIC METABOLIC PNL TOTAL CA: CPT

## 2024-08-26 PROCEDURE — 36415 COLL VENOUS BLD VENIPUNCTURE: CPT

## 2024-08-26 PROCEDURE — 250N000011 HC RX IP 250 OP 636

## 2024-08-26 PROCEDURE — 250N000013 HC RX MED GY IP 250 OP 250 PS 637

## 2024-08-26 PROCEDURE — 250N000013 HC RX MED GY IP 250 OP 250 PS 637: Performed by: INTERNAL MEDICINE

## 2024-08-26 PROCEDURE — 120N000001 HC R&B MED SURG/OB

## 2024-08-26 PROCEDURE — 250N000011 HC RX IP 250 OP 636: Performed by: STUDENT IN AN ORGANIZED HEALTH CARE EDUCATION/TRAINING PROGRAM

## 2024-08-26 PROCEDURE — 250N000012 HC RX MED GY IP 250 OP 636 PS 637

## 2024-08-26 RX ORDER — CEFAZOLIN SODIUM 1 G/50ML
1250 SOLUTION INTRAVENOUS EVERY 24 HOURS
Status: DISCONTINUED | OUTPATIENT
Start: 2024-08-26 | End: 2024-08-27

## 2024-08-26 RX ORDER — SULFAMETHOXAZOLE AND TRIMETHOPRIM 400; 80 MG/1; MG/1
1 TABLET ORAL 2 TIMES DAILY
Status: DISCONTINUED | OUTPATIENT
Start: 2024-08-26 | End: 2024-08-27

## 2024-08-26 RX ADMIN — ACETAMINOPHEN 650 MG: 325 TABLET ORAL at 14:22

## 2024-08-26 RX ADMIN — PIPERACILLIN AND TAZOBACTAM 3.38 G: 3; .375 INJECTION, POWDER, FOR SOLUTION INTRAVENOUS at 21:58

## 2024-08-26 RX ADMIN — PREDNISONE 5 MG: 5 TABLET ORAL at 09:30

## 2024-08-26 RX ADMIN — THERA TABS 1 TABLET: TAB at 09:30

## 2024-08-26 RX ADMIN — ACYCLOVIR 400 MG: 400 TABLET ORAL at 09:32

## 2024-08-26 RX ADMIN — MAGNESIUM OXIDE TAB 400 MG (241.3 MG ELEMENTAL MG) 400 MG: 400 (241.3 MG) TAB at 20:00

## 2024-08-26 RX ADMIN — AMLODIPINE BESYLATE 10 MG: 5 TABLET ORAL at 09:30

## 2024-08-26 RX ADMIN — HYDROMORPHONE HYDROCHLORIDE 2 MG: 2 TABLET ORAL at 03:46

## 2024-08-26 RX ADMIN — SULFAMETHOXAZOLE AND TRIMETHOPRIM 1 TABLET: 400; 80 TABLET ORAL at 20:00

## 2024-08-26 RX ADMIN — FLUDROCORTISONE ACETATE 0.1 MG: 0.1 TABLET ORAL at 09:31

## 2024-08-26 RX ADMIN — CLOPIDOGREL BISULFATE 75 MG: 75 TABLET ORAL at 09:30

## 2024-08-26 RX ADMIN — FLUTICASONE FUROATE AND VILANTEROL TRIFENATATE 1 PUFF: 100; 25 POWDER RESPIRATORY (INHALATION) at 09:40

## 2024-08-26 RX ADMIN — SODIUM CHLORIDE 1250 MG: 9 INJECTION, SOLUTION INTRAVENOUS at 11:59

## 2024-08-26 RX ADMIN — PIPERACILLIN AND TAZOBACTAM 3.38 G: 3; .375 INJECTION, POWDER, FOR SOLUTION INTRAVENOUS at 14:22

## 2024-08-26 RX ADMIN — HYDROMORPHONE HYDROCHLORIDE 2 MG: 2 TABLET ORAL at 21:01

## 2024-08-26 RX ADMIN — Medication 1 TABLET: at 18:05

## 2024-08-26 RX ADMIN — MAGNESIUM OXIDE TAB 400 MG (241.3 MG ELEMENTAL MG) 400 MG: 400 (241.3 MG) TAB at 09:29

## 2024-08-26 RX ADMIN — ACYCLOVIR 400 MG: 400 TABLET ORAL at 20:00

## 2024-08-26 RX ADMIN — ACETAMINOPHEN 650 MG: 325 TABLET ORAL at 06:39

## 2024-08-26 RX ADMIN — PREDNISONE 2.5 MG: 2.5 TABLET ORAL at 20:00

## 2024-08-26 RX ADMIN — ACETAMINOPHEN 650 MG: 325 TABLET ORAL at 21:03

## 2024-08-26 RX ADMIN — ROSUVASTATIN 20 MG: 10 TABLET, FILM COATED ORAL at 09:31

## 2024-08-26 RX ADMIN — TACROLIMUS 3 MG: 1 CAPSULE ORAL at 20:00

## 2024-08-26 RX ADMIN — CARVEDILOL 6.25 MG: 6.25 TABLET, FILM COATED ORAL at 18:05

## 2024-08-26 RX ADMIN — ASPIRIN 81 MG: 81 TABLET, COATED ORAL at 09:31

## 2024-08-26 RX ADMIN — Medication 1 TABLET: at 09:29

## 2024-08-26 RX ADMIN — PANTOPRAZOLE SODIUM 40 MG: 20 TABLET, DELAYED RELEASE ORAL at 09:29

## 2024-08-26 RX ADMIN — PIPERACILLIN AND TAZOBACTAM 3.38 G: 3; .375 INJECTION, POWDER, FOR SOLUTION INTRAVENOUS at 06:33

## 2024-08-26 RX ADMIN — TACROLIMUS 3 MG: 1 CAPSULE ORAL at 09:32

## 2024-08-26 RX ADMIN — DAPSONE 50 MG: 25 TABLET ORAL at 09:33

## 2024-08-26 RX ADMIN — HYDROMORPHONE HYDROCHLORIDE 2 MG: 2 TABLET ORAL at 13:22

## 2024-08-26 RX ADMIN — CARVEDILOL 6.25 MG: 6.25 TABLET, FILM COATED ORAL at 09:31

## 2024-08-26 ASSESSMENT — ACTIVITIES OF DAILY LIVING (ADL)
ADLS_ACUITY_SCORE: 37
ADLS_ACUITY_SCORE: 37
ADLS_ACUITY_SCORE: 36
ADLS_ACUITY_SCORE: 36
ADLS_ACUITY_SCORE: 39
ADLS_ACUITY_SCORE: 36
ADLS_ACUITY_SCORE: 37
ADLS_ACUITY_SCORE: 36
ADLS_ACUITY_SCORE: 39
ADLS_ACUITY_SCORE: 36
ADLS_ACUITY_SCORE: 37
ADLS_ACUITY_SCORE: 39
ADLS_ACUITY_SCORE: 37
ADLS_ACUITY_SCORE: 37
ADLS_ACUITY_SCORE: 36
ADLS_ACUITY_SCORE: 36
ADLS_ACUITY_SCORE: 37
ADLS_ACUITY_SCORE: 37
ADLS_ACUITY_SCORE: 41
ADLS_ACUITY_SCORE: 37
ADLS_ACUITY_SCORE: 37

## 2024-08-26 NOTE — PLAN OF CARE
Goal Outcome Evaluation:      Plan of Care Reviewed With: patient          Outcome Evaluation: Will need to go to TCU for rehab.

## 2024-08-26 NOTE — PLAN OF CARE
Problem: Infection  Goal: Absence of Infection Signs and Symptoms  Outcome: Progressing     Problem: Pain Acute  Goal: Optimal Pain Control and Function  Outcome: Not Progressing  Intervention: Develop Pain Management Plan  Recent Flowsheet Documentation  Taken 8/26/2024 0639 by Lona Mcdaniel RN  Pain Management Interventions: medication (see MAR)  Taken 8/26/2024 0346 by Lona Mcdaniel RN  Pain Management Interventions:   medication (see MAR)   pillow support provided  Taken 8/26/2024 0027 by Lona Mcdaniel RN  Pain Management Interventions:   pillow support provided   repositioned     Problem: Risk for Delirium  Goal: Improved Sleep  Outcome: Not Progressing     Problem: Pain Acute  Goal: Optimal Pain Control and Function  Outcome: Not Progressing  Intervention: Develop Pain Management Plan  Recent Flowsheet Documentation  Taken 8/26/2024 0639 by Lona Mcdaniel RN  Pain Management Interventions: medication (see MAR)  Taken 8/26/2024 0346 by Lona Mcdaniel RN  Pain Management Interventions:   medication (see MAR)   pillow support provided  Taken 8/26/2024 0027 by Lona Mcdaniel RN  Pain Management Interventions:   pillow support provided   repositioned     Problem: Glycemic Control Impaired  Goal: Blood Glucose Level Within Targeted Range  Outcome: Progressing   Goal Outcome Evaluation:    A/O x 4. RLE dressing intact. Elevated on pillows. Continuous IVF NS 75 ml/hr. Scheduled IV Zosyn given for possible infection around right lower extremity wound. Preliminary results available for blood and wound cultures. Blood sugar 102 at 0213. PRN oral dilaudid and Tylenol given for pain; minor relief. Has AKA surgery scheduled on 8/29.

## 2024-08-26 NOTE — PROGRESS NOTES
[facility]    Medicine Progress Note - Hospitalist Service    Date of Admission:  8/25/2024    Assessment & Plan   Aubrey Duncan is a 69 year old male with PMH lung transplant (2013), PAD, chronic limb threatening ischemia, CAD, HTN, HLD, COPD, GERD, DM type II, and CKD is admitted on 8/25/2024 for right lower extremity wound infection.     Right lower extremity wound infection  -Wound dehiscence positive for purulent drainage  -Wound culture: 4+ gram-negative bacilli, 4+ gram-positive cocci, 2+ WBC seen  -Infectious disease consulted  -continue IV vancomycin and Zosyn pending ID recs and culture results  -question MRI to rule out osteo, defer to ID as AKA is already scheduled for 8/29/24  -Blood cultures pending  -Vascular consulted  -Wound consulted  -PTA Tylenol, Dilaudid for pain control      SHELLEY on CKD- resolved   Lactic acidosis- resolved   -stop IV fluids   -Trend BMP  -restart PTA furosemide, lisinopril     DM type II  -Hemoglobin A1c 4.2   -continue PTA Lantus, hold when patient is NPO tomorrow   -Sliding scale insulin  -hypoglycemia protocol      CAD  -PTA aspirin, carvedilol     HTN  -PTA amlodipine  -lasix and lisinopril as noted above      HLD  -PTA rosuvastatin     PAD  -PTA aspirin, clopidogrel    Hx lung transplant  -continue acyclovir, dapsone ppx  -holding PTA azithromycin 3x weekly while on empiric abx as noted above   -continue PTA prednisone   -will continue to treat with tacrolimus unless s/s of progressing infection  -will continue PTA fludrocortisone (taking due to CNI related hyperkalemia)   -continue home inhalers         Diet: Combination Diet Regular Diet Adult    DVT Prophylaxis: SCDs  Naranjo Catheter: Not present  Lines: None     Cardiac Monitoring: None  Code Status: Full Code      Clinically Significant Risk Factors Present on Admission        # Hyperkalemia: Highest K = 5.4 mmol/L in last 2 days, will monitor as appropriate         # Drug Induced Platelet  Defect: home medication list includes an antiplatelet medication   # Hypertension: Noted on problem list    # Anemia: based on hgb <11           # Financial/Environmental Concerns: none               Disposition Plan     Medically Ready for Discharge: Anticipated in 2-4 Days             Kenneth Sales DO  Hospitalist Service  [facility]  Securely message with [employer] (more info)  Text page via [employer] Paging/Directory   ______________________________________________________________________    Interval History   Patient without overnight events. States no fevers or chills. Pain is similar to prior and no excessive weeping of leg.     Physical Exam   Vital Signs: Temp: 98.6  F (37  C) Temp src: Oral BP: (!) 142/75 Pulse: 94   Resp: 18 SpO2: 92 % O2 Device: None (Room air)    Weight: 143 lbs 0 oz    General Appearance: Alert, nontoxic  Respiratory: CTAB, breathing comfortably on room air  Cardiovascular: RRR, no murmur   GI: no pain  Skin: left LE with black eschar on big toe, wound dehiscence of LLE wound with purulent drainage, no probe to bone   Other: Alert and oriented      Medical Decision Making       60 MINUTES SPENT BY ME on the date of service doing chart review, history, exam, documentation & further activities per the note.      Data     I have personally reviewed the following data over the past 24 hrs:    8.2  \   9.6 (L)   / 279     139 105 33.4 (H) /  105 (H)   5.2 25 1.11 \     TSH: N/A T4: N/A A1C: 4.2     Procal: N/A CRP: 82.30 (H) Lactic Acid: 1.4         Imaging results reviewed over the past 24 hrs:   No results found for this or any previous visit (from the past 24 hour(s)).

## 2024-08-26 NOTE — CONSULTS
MAIKEL consulted for R leg wound, upon review of the chart vascular surgery has outlined the plan below:    _______________________________________________________________________________________________________________________________________________________________________  69M with hx lung tx, HTN, T2D, and PAD s/p attempted RLE bypass with inadequate distal target. Patient is currently scheduled for R AKA on Thursday 8/29/2024 but presents today with worsening pain and concern for infection of previous surgical site. No evidence of systemic infection, but likely localized cellulitis around wound.     - Admit to Hospitalist  - Broad spectrum antibiotics  - Will continue with planned above knee amputation on Thursday 8/29        Discussed with staff, Dr. Jay and Dr. Sneed.     Tanner Hernandez MD  Vascular Surgery resident  _________________________________________________________________________________________________________________________________________________________________________    WOC team will sign off of patient at this time, defer to Vascular team for wound care needs before amputation on 8/29.    JEFF CambpellN RN CWOCN  Pager no longer in use, please contact through [employer] group: [facility] [employer]

## 2024-08-26 NOTE — CONSULTS
"Care Management Initial Consult    General Information  Assessment completed with: PatientAubrey  Type of CM/SW Visit: Initial Assessment    Primary Care Provider verified and updated as needed: Yes   Readmission within the last 30 days: no previous admission in last 30 days (8/1/24 in surgery only.)      Reason for Consult: discharge planning, facility placement  Advance Care Planning: Advance Care Planning Reviewed: present on chart          Communication Assessment  Patient's communication style: spoken language (English or Bilingual)             Cognitive  Cognitive/Neuro/Behavioral:                        Living Environment:   People in home: spouse, parent(s)  Aubrey and \"wife Kyung and mother in law Sanaz Reyna who is 87.\"  Current living Arrangements: house (\"We live in a double wide. It has a ramp to get inside the house and then it is all 1 level inside. It is at [address] [place], [place] [id number].\" The facesheet lists a PO box for the mailing address.)      Able to return to prior arrangements: no (Will need TCU post surgery)  Living Arrangement Comments: After I have surgery and go to TCU the plan is to decide if I can return to our house in [place], [place] or if not I can go to my daughter Nikole's house in [place], [place]. My wife is staying in [place] at Nikole's house while I am in the hospital.    Family/Social Support:  Care provided by: self  Provides care for: no one (\"my wife helps her mother Sanaz Reyna as needed, but at 87 she is actually pretty independent\".)  Marital Status:   Wife, Children  Kyung       Description of Support System: Supportive, Involved    Support Assessment: Adequate family and caregiver support, Adequate social supports, Patient communicates needs well met    Current Resources:   Patient receiving home care services: No     Community Resources: None  Equipment currently used at home: glucometer, other (see comments) (\"There is a ramp to get into the house. We just borrowed a scooter from " "family to get prepared for me needing it after I get home after surgery. Right now I don't use any mobility equipment\".)  Supplies currently used at home: Diabetic Supplies, Hearing Aid Batteries, Wound Care Supplies (\"My wife has been helping with caring for my wound. hearing aids\")    Employment/Financial:  Employment Status: retired     Employment/ Comments: \"no  history\"  Financial Concerns: none   Referral to Financial Worker: No       Does the patient's insurance plan have a 3 day qualifying hospital stay waiver?  No    Lifestyle & Psychosocial Needs:  Social Determinants of Health     Food Insecurity: No Food Insecurity (3/22/2024)    Received from [facility], [facility]    Hunger Vital Sign     Worried About Running Out of Food in the Last Year: Never true     Ran Out of Food in the Last Year: Never true   Depression: Not at risk (8/20/2024)    PHQ-2     PHQ-2 Score: 0   Housing Stability: Low Risk  (3/22/2024)    Received from [facility]     Retired - What is your living situation today? : I have a steady place to live   Tobacco Use: Medium Risk (8/20/2024)    Patient History     Smoking Tobacco Use: Former     Smokeless Tobacco Use: Never     Passive Exposure: Past   Financial Resource Strain: Unknown (3/18/2024)    Financial Resource Strain     Within the past 12 months, have you or your family members you live with been unable to get utilities (heat, electricity) when it was really needed?: Patient declined   Alcohol Use: Not At Risk (9/10/2019)    AUDIT-C     Frequency of Alcohol Consumption: Never     Average Number of Drinks: Not on file     Frequency of Binge Drinking: Not on file   Transportation Needs: No Transportation Needs (3/22/2024)    Received from [facility], [facility]    " "PRAPARE - Transportation     Lack of Transportation (Medical): No     Lack of Transportation (Non-Medical): No   Physical Activity: Not on file   Interpersonal Safety: Low Risk  (8/7/2024)    Interpersonal Safety     Do you feel physically and emotionally safe where you currently live?: Yes     Within the past 12 months, have you been hit, slapped, kicked or otherwise physically hurt by someone?: No     Within the past 12 months, have you been humiliated or emotionally abused in other ways by your partner or ex-partner?: No   Stress: Not on file   Social Connections: Not on file   Health Literacy: Not on file       Functional Status:  Prior to admission patient needed assistance:   Dependent ADLs:: Independent, Ambulation-no assistive device  Dependent IADLs:: Independent (\"I drive and my wife drives\")  Assesssment of Functional Status: At functional baseline    Mental Health Status:  Mental Health Status: No Current Concerns       Chemical Dependency Status:  Chemical Dependency Status: No Current Concerns             Values/Beliefs:  Spiritual, Cultural Beliefs, Yazdanism Practices, Values that affect care: no               Additional Information:  Aubrey lives in a house with his wife Kyung and mother in law Sanaz Reyna who is 87.    \"We live in a double wide. It has a ramp to get inside the house and then it is all 1 level inside. It is at [address] [place] [id number].\" The facesheet lists a PO box for the mailing address. \"After I have surgery and go to TCU the plan is to decide if I can return to our house in [place], [place] or if not I can go to my daughter Nikole's house in [place], [place]. My wife is staying in [place] at Nikole's house while I am in the hospital.\"    \"We just borrowed a scooter from family to get prepared for me needing it after I get home after surgery. Right now I don't use any mobility equipment. My wife has been helping with my wound cares at home\".     Surgery for an above the knee amputation is " "scheduled for 8/29/24. Post surgery he plans on TCU. He wants TCU in this order: 1. [facility] TCU, 2. [facility], 3. [facility] [place]. He states, \"we picked them in order of what is closest to my daughter's house.\" Referrals placed.    \"If I can get into the car my wife can drive, but otherwise may need wheelchair transport at discharge\".     CM to follow for medical progression of care, discharge recommendations, and final discharge plan.    Vanda Burton RN    "
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
## 2024-08-26 NOTE — ED NOTES
"[facility] ED Handoff Report    ED Chief Complaint: wound check    ED Diagnosis:  (I73.9) PAD (peripheral artery disease) (H24)    (T81.30XA) Wound dehiscence    (N17.9) SHELLEY (acute kidney injury) (H24)    (A41.9) Sepsis (H)       PMH:    Past Medical History:   Diagnosis Date    Acute postoperative pain 09/11/2013    Alpha-1-antitrypsin deficiency (H)     Arthritis     Basal cell carcinoma     CMV (cytomegalovirus infection) (H)     Reacttivation Sept 2013 when valcyte held    Coronary artery disease     DVT of upper extremity (deep vein thrombosis) (H) 09/2013    Nonocclusive thrombosis extending from the right subclavian vein to the right axillary vein,  Segmental occlusion of right basilic vein in the upper arm. 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    With DVT and thrombocytopenia    Hypertension     Lung transplant status, bilateral (H) 09/08/2013    Complicated by HIT and esophageal dysfunction    Pneumonia of right lower lobe due to Pseudomonas species (H) 02/28/2019    Sepsis associated hypotension (H) 02/24/2019    Squamous cell carcinoma     Stented coronary artery     Steroid-induced diabetes mellitus (H24)     Thrombocytopaenia     due to HIT    Ureteral stone 10/17/2017        Code Status:  Full Code     Falls Risk: Yes Band: Applied    Current Living Situation/Residence: lives with a significant other     Elimination Status: Continent: Yes     Activity Level: SBA w/ walker    Patients Preferred Language:  English     Needed: No    Vital Signs:  /72   Pulse 80   Temp 97.6  F (36.4  C) (Temporal)   Resp 18   Ht 1.727 m (5' 8\")   Wt 64.9 kg (143 lb)   SpO2 98%   BMI 21.74 kg/m       Cardiac Rhythm: SR 80s    Pain Score: 6/10    Is the Patient Confused:  No    Last Food or Drink: 08/25/24 "     Focused Assessment:  Patient presents from home with c/o possible wound infection of right lower leg.  Pt had recent bypass surgery, to have AKA right lower extremity on 8/29.  Pt came to ED today due to increased pain.     Tests Performed: Done: Labs and Imaging    Treatments Provided:  see MAR    Family Dynamics/Concerns: No    Family Updated On Visitor Policy: Yes    Plan of Care Communicated to Family: Yes    Who Was Updated about Plan of Care: wife    Belongings Checklist Done and Signed by Patient: Yes    Medications sent with patient: none    Covid: asymptomatic     Additional Information: none    RN: Sonia Lopez RN 8/25/2024 8:24 PM

## 2024-08-26 NOTE — PHARMACY-VANCOMYCIN DOSING SERVICE
"Pharmacy Vancomycin Initial Note  Date of Service 2024  Patient's  1955  69 year old, male    Indication: Sepsis and Skin and Soft Tissue Infection    Current estimated CrCl = Estimated Creatinine Clearance: 56 mL/min (based on SCr of 1.11 mg/dL).    Creatinine for last 3 days  2024:  4:32 PM Creatinine 1.85 mg/dL  2024:  5:28 AM Creatinine 1.11 mg/dL    Recent Vancomycin Level(s) for last 3 days  No results found for requested labs within last 3 days.      Vancomycin IV Administrations (past 72 hours)                     vancomycin (VANCOCIN) 1,250 mg in sodium chloride 0.9 % 250 mL intermittent infusion (mg) 1,250 mg New Bag 24 1731                    Nephrotoxins and other renal medications (From now, onward)      Start     Dose/Rate Route Frequency Ordered Stop    24 1200  vancomycin (VANCOCIN) 1,250 mg in sodium chloride 0.9 % 250 mL intermittent infusion         1,250 mg  over 90 Minutes Intravenous EVERY 24 HOURS 24 0839      24 0800  [Held by provider]  furosemide (LASIX) tablet 20 mg        (On hold since yesterday at  until manually unheld; held by Miranda Sol NPHold Reason: Acute Kidney Injury)   Note to Pharmacy: PTA Sig:Take 1 tablet (20 mg) by mouth daily      20 mg Oral DAILY 24 0800  [Held by provider]  lisinopril (ZESTRIL) tablet 40 mg        (On hold since yesterday at  until manually unheld; held by Miranda Sol NPHold Reason: Acute Kidney Injury)   Note to Pharmacy: PTA Sig:TAKE 1 TABLET (40 MG) BY MOUTH DAILY IN THE MORNING      40 mg Oral DAILY 24 0800  tacrolimus (GENERIC EQUIVALENT) capsule 3 mg        Note to Pharmacy: PTA Sig:Take 3 capsules (3 mg) by mouth every morning AND 3 capsules (3 mg) every evening. Total dose: 3 mg in AM and 3 mg in PM     Placed in \"And\" Linked Group    3 mg Oral EVERY MORNING 24  piperacillin-tazobactam " "(ZOSYN) 3.375 g vial to attach to  mL bag        Note to Pharmacy: For [facility], [facility] and [facility]: For Zosyn-naive patients, use the \"Zosyn initial dose + extended infusion\" order panel.    3.375 g  over 240 Minutes Intravenous EVERY 8 HOURS 08/25/24 1951 08/25/24 2200  acyclovir (ZOVIRAX) tablet 400 mg        Note to Pharmacy: PTA Sig:TAKE 1 TABLET (400 MG) BY MOUTH 2 TIMES DAILY      400 mg Oral 2 TIMES DAILY 08/25/24 2018 08/25/24 2200  tacrolimus (GENERIC EQUIVALENT) capsule 3 mg        Note to Pharmacy: PTA Sig:Take 3 capsules (3 mg) by mouth every morning AND 3 capsules (3 mg) every evening. Total dose: 3 mg in AM and 3 mg in PM     Placed in \"And\" Linked Group    3 mg Oral EVERY EVENING 08/25/24 2018              Contrast Orders - past 72 hours (72h ago, onward)      None            [employer] Prediction of Planned Initial Vancomycin Regimen  Regimen: 1250 mg IV every 24 hours.  Start time: 12:00 on 08/26/2024  Exposure target: AUC24 (range)400-600 mg/L.hr   AUC24,ss: 493 mg/L.hr  Probability of AUC24 > 400: 74 %  Ctrough,ss: 14.2 mg/L  Probability of Ctrough,ss > 20: 21 %  Probability of nephrotoxicity (Lodise SEYMOUR 2009): 9 %        Plan:  Due to significant improvement in SHELLEY, change vancomycin to 1250 mg IV q24h.   Vancomycin monitoring method: AUC  Vancomycin therapeutic monitoring goal: 400-600 mg*h/L  Pharmacy will check vancomycin levels as appropriate with AM labs tomorrow.  Serum creatinine levels will be ordered daily for the first week of therapy and at least twice weekly for subsequent weeks.      Osiel Roger [facility]    "

## 2024-08-26 NOTE — PLAN OF CARE
Problem: Pain Acute  Goal: Optimal Pain Control and Function  Outcome: Progressing     Problem: Infection  Goal: Absence of Infection Signs and Symptoms  Outcome: Progressing     Problem: Glycemic Control Impaired  Goal: Blood Glucose Level Within Targeted Range  Outcome: Progressing   Goal Outcome Evaluation:       Pt arrived on unit via stretcher at 2100 from ED and was able to ambulate to bathroom and then to bed with SBA. Pt rated pain in right leg at a 6 and was given dilaudid and tylenol. Pt stated it helped for a short time and then valeriano back up to a pain rating of 7. Reported status to oncoming RN. Dressed wound with ABD and kerlix. Pt receiving fluids and antibiotics Via PIV. Blood sugar upon arrival was 104 and pt was requesting food. Pt ate a turkey sandwich and SF Jello. Resting in bed.

## 2024-08-26 NOTE — PHARMACY-VANCOMYCIN DOSING SERVICE
"Pharmacy Vancomycin Initial Note  Date of Service 2024  Patient's  1955  69 year old, male    Indication: Sepsis and Skin and Soft Tissue Infection    Current estimated CrCl = Estimated Creatinine Clearance: 33.6 mL/min (A) (based on SCr of 1.85 mg/dL (H)).    Creatinine for last 3 days  2024:  4:32 PM Creatinine 1.85 mg/dL    Recent Vancomycin Level(s) for last 3 days  No results found for requested labs within last 3 days.      Vancomycin IV Administrations (past 72 hours)                     vancomycin (VANCOCIN) 1,250 mg in sodium chloride 0.9 % 250 mL intermittent infusion (mg) 1,250 mg New Bag 24 173                    Nephrotoxins and other renal medications (From now, onward)      Start     Dose/Rate Route Frequency Ordered Stop    24  piperacillin-tazobactam (ZOSYN) 3.375 g vial to attach to  mL bag        Note to Pharmacy: For [facility], [facility] and [facility]: For Zosyn-naive patients, use the \"Zosyn initial dose + extended infusion\" order panel.    3.375 g  over 240 Minutes Intravenous EVERY 8 HOURS 24  vancomycin (VANCOCIN) 1,250 mg in sodium chloride 0.9 % 250 mL intermittent infusion         1,250 mg  over 90 Minutes Intravenous EVERY 24 HOURS 24              Contrast Orders - past 72 hours (72h ago, onward)      None            [employer] Prediction of Planned Initial Vancomycin Regimen  Loading dose: 1250 mg   Regimen: 750 mg IV every 24 hours.  Start time: 17:31 on 2024  Exposure target: AUC24 (range)400-600 mg/L.hr   AUC24,ss: 450 mg/L.hr  Probability of AUC24 > 400: 63 %  Ctrough,ss: 14.6 mg/L  Probability of Ctrough,ss > 20: 21 %  Probability of nephrotoxicity (Lodise SEYMOUR ): 10 %          Plan:  Start vancomycin  750 mg IV q24h.   Vancomycin monitoring method: AUC  Vancomycin therapeutic monitoring goal: 400-600 mg*h/L  Pharmacy will check vancomycin levels as appropriate in 1-3 Days.    Serum creatinine levels will " be ordered daily for the first week of therapy and at least twice weekly for subsequent weeks.      SHAINA SAWANT RPH

## 2024-08-26 NOTE — PLAN OF CARE
Problem: Adult Inpatient Plan of Care  Goal: Absence of Hospital-Acquired Illness or Injury  Intervention: Identify and Manage Fall Risk  Recent Flowsheet Documentation  Taken 8/26/2024 0900 by Harmony Melton RN  Safety Promotion/Fall Prevention:   activity supervised   clutter free environment maintained   lighting adjusted   nonskid shoes/slippers when out of bed   patient and family education     Problem: Adult Inpatient Plan of Care  Goal: Absence of Hospital-Acquired Illness or Injury  Intervention: Prevent Skin Injury  Recent Flowsheet Documentation  Taken 8/26/2024 0900 by Harmony Melton RN  Body Position: position changed independently     Problem: Adult Inpatient Plan of Care  Goal: Absence of Hospital-Acquired Illness or Injury  Intervention: Prevent Infection  Recent Flowsheet Documentation  Taken 8/26/2024 0900 by Harmony Melton RN  Infection Prevention:   rest/sleep promoted   single patient room provided   hand hygiene promoted     Problem: Risk for Delirium  Goal: Optimal Coping  Outcome: Progressing   Goal Outcome Evaluation:Pt alert and oriented, reported severe pain on right and mild pain left foot, Dilaudid 2 mg given with relieve, pt  ambulates to bathroom with SBA, uses urinal at the bedside.Am BS 68, strawberry juice given, pt ate breakfast, recheck BS 98,insulin held. Wife at the bedside, pt can make his needs known.

## 2024-08-26 NOTE — PROGRESS NOTES
"CLINICAL NUTRITION SERVICES - ASSESSMENT NOTE     Nutrition Prescription    RECOMMENDATIONS FOR MDs/PROVIDERS TO ORDER:  Consider 100 mg thiamine daily due to severe malnutrition    Malnutrition Status:    Severe in acute on chronic illness    Recommendations already ordered by Registered Dietitian (RD):  SB glucerna daily ( just wants to try one at this time)  Expedite daily for wound healing    Future/Additional Recommendations:  Monitor po intake, weight, labs     REASON FOR ASSESSMENT  Aubrey Duncan is a/an 69 year old male assessed by the dietitian for Admission Nutrition Risk Screen for positive weight loss and decreased appetite    Pt with past medical history of lung transplant ( 2013), PAD, chronic limb threatening ischemia, CAD, hypertension, HLD, COPD, GERD, DM 2 and CKD was admitted on 8/25/2024 for right lower extremity wound infection  (wound dehiscence positive for purulent drainage)    NUTRITION HISTORY  Pt states he avoids simple CHOs due to diabetes. He states weight loss of ~ 10 lb in < 1 month. Poor po intake x 1 month ( < 75%).  His wife states that at one point he weighed 160 lb and is now 143 lb.  He is willing to try some glucerna-strawberry flavor-just wants to start with one    CURRENT NUTRITION ORDERS  Diet: Regular  Intake/Tolerance: Ate 100% breakfast 8/26 and 100% supper on 8/25/2024    LABS  Labs reviewed: Na 139, K 5.2, urea nitrogen 33.4 (H), Cr 1.11, Ca 8.1 (L), Glu 85    MEDICATIONS  Medications reviewed: zovirax, norvasc, calcium carbonate-vit D, coreg, plavix, aczone, florinef, lasix, novolog, lantus pen, zestril, Mag-Ox, multi vitamin, pantoprazole, zosyn, prednisone, crestor, tacrolimus, vancocin    ANTHROPOMETRICS  Height: 172.7 cm (5' 8\")  Most Recent Weight: 64.9 kg (143 lb)    IBW: 70 kg ( 154 lb)  BMI: Normal BMI  Weight History:   Wt Readings from Last 10 Encounters:   08/25/24 64.9 kg (143 lb)   08/20/24 65.3 kg (144 lb)   08/13/24 65.3 kg (144 lb)   08/07/24 68.2 kg " (150 lb 6.4 oz)   08/02/24 69.4 kg (153 lb)   08/01/24 69.7 kg (153 lb 9.6 oz)   07/31/24 70.3 kg (155 lb)   07/12/24 74.5 kg (164 lb 3.2 oz)   06/28/24 75.8 kg (167 lb)   06/25/24 76 kg (167 lb 9.6 oz)   Weight loss of 24 lb in the past 2 months ( 14.4%)    Dosing Weight: 64.9 kg    ASSESSED NUTRITION NEEDS  Estimated Energy Needs: 1947+ kcals/day (30+ Hugo/Kg)  Justification: Repletion  Estimated Protein Needs: 78-97 grams protein/day (1.2 - 1.5 grams of pro/kg)  Justification: Wound healing  Estimated Fluid Needs: 1947 mL/day (30 ml/Kg)   Justification: Maintenance    PHYSICAL FINDINGS  See malnutrition section below.  Skin: Cracked/peeling foot, scab on shin/calf  Stevenson score=19, nutrition noted as adequate  GI: WDL  Last BM 8/25/2024    MALNUTRITION:  % Weight Loss:  > 7.5% in 3 months (severe malnutrition)  % Intake:  </= 75% for >/= 1 month (severe malnutrition)  Subcutaneous Fat Loss:  Orbital region moderate depletion and Upper arm region moderate depletion  Muscle Loss:  Temporal region moderate depletion, Patellar region moderate depletion, Anterior thigh region moderate depletion, and Posterior calf region moderate depletion  Fluid Retention:  None noted    Malnutrition Diagnosis: Severe malnutrition  In Context of:  Acute illness or injury  Chronic illness or disease    NUTRITION DIAGNOSIS  Malnutrition related to acute on chronic illness as evidenced by 14.4% weight loss in the past 2 months, inadequate oral intake < 75%>/= 1 month and moderate subcutaneous fat and muscle loss      INTERVENTIONS  Implementation  Nutrition Education: Will be provided if education needs arise   Medical food supplement therapy -SB glucerna daily at 2 pm  Expedite for wound healing  Recommend 100 mg thiamine daily due to severe malnutrition    Goals  Total avg nutritional intake to meet a minimum of 30 kcal/kg and 1.2+ g PRO/kg daily (per dosing wt 64.9 kg).  Prevent further weight loss  Wound healing      Monitoring/Evaluation  Progress toward goals will be monitored and evaluated per protocol.
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
## 2024-08-27 LAB
ALBUMIN SERPL BCG-MCNC: 2.8 G/DL (ref 3.5–5.2)
ALP SERPL-CCNC: 109 U/L (ref 40–150)
ALT SERPL W P-5'-P-CCNC: 20 U/L (ref 0–70)
ANION GAP SERPL CALCULATED.3IONS-SCNC: 9 MMOL/L (ref 7–15)
AST SERPL W P-5'-P-CCNC: 32 U/L (ref 0–45)
BILIRUB SERPL-MCNC: 0.3 MG/DL
BUN SERPL-MCNC: 22 MG/DL (ref 8–23)
CALCIUM SERPL-MCNC: 7.9 MG/DL (ref 8.8–10.4)
CHLORIDE SERPL-SCNC: 104 MMOL/L (ref 98–107)
CREAT SERPL-MCNC: 0.82 MG/DL (ref 0.67–1.17)
EGFRCR SERPLBLD CKD-EPI 2021: >90 ML/MIN/1.73M2
ERYTHROCYTE [DISTWIDTH] IN BLOOD BY AUTOMATED COUNT: 14.3 % (ref 10–15)
GLUCOSE BLDC GLUCOMTR-MCNC: 139 MG/DL (ref 70–99)
GLUCOSE BLDC GLUCOMTR-MCNC: 149 MG/DL (ref 70–99)
GLUCOSE BLDC GLUCOMTR-MCNC: 151 MG/DL (ref 70–99)
GLUCOSE BLDC GLUCOMTR-MCNC: 158 MG/DL (ref 70–99)
GLUCOSE BLDC GLUCOMTR-MCNC: 86 MG/DL (ref 70–99)
GLUCOSE SERPL-MCNC: 129 MG/DL (ref 70–99)
HCO3 SERPL-SCNC: 26 MMOL/L (ref 22–29)
HCT VFR BLD AUTO: 30.3 % (ref 40–53)
HGB BLD-MCNC: 9.6 G/DL (ref 13.3–17.7)
MCH RBC QN AUTO: 32.3 PG (ref 26.5–33)
MCHC RBC AUTO-ENTMCNC: 31.7 G/DL (ref 31.5–36.5)
MCV RBC AUTO: 102 FL (ref 78–100)
PLATELET # BLD AUTO: 270 10E3/UL (ref 150–450)
POTASSIUM SERPL-SCNC: 4.8 MMOL/L (ref 3.4–5.3)
PROT SERPL-MCNC: 5.3 G/DL (ref 6.4–8.3)
RBC # BLD AUTO: 2.97 10E6/UL (ref 4.4–5.9)
SODIUM SERPL-SCNC: 139 MMOL/L (ref 135–145)
VANCOMYCIN SERPL-MCNC: 7.7 UG/ML
WBC # BLD AUTO: 8.2 10E3/UL (ref 4–11)

## 2024-08-27 PROCEDURE — 120N000001 HC R&B MED SURG/OB

## 2024-08-27 PROCEDURE — 80202 ASSAY OF VANCOMYCIN: CPT | Performed by: STUDENT IN AN ORGANIZED HEALTH CARE EDUCATION/TRAINING PROGRAM

## 2024-08-27 PROCEDURE — 80053 COMPREHEN METABOLIC PANEL: CPT | Performed by: INTERNAL MEDICINE

## 2024-08-27 PROCEDURE — 250N000011 HC RX IP 250 OP 636: Performed by: STUDENT IN AN ORGANIZED HEALTH CARE EDUCATION/TRAINING PROGRAM

## 2024-08-27 PROCEDURE — 250N000013 HC RX MED GY IP 250 OP 250 PS 637

## 2024-08-27 PROCEDURE — 99233 SBSQ HOSP IP/OBS HIGH 50: CPT | Performed by: STUDENT IN AN ORGANIZED HEALTH CARE EDUCATION/TRAINING PROGRAM

## 2024-08-27 PROCEDURE — 99233 SBSQ HOSP IP/OBS HIGH 50: CPT | Performed by: INTERNAL MEDICINE

## 2024-08-27 PROCEDURE — 36415 COLL VENOUS BLD VENIPUNCTURE: CPT | Performed by: INTERNAL MEDICINE

## 2024-08-27 PROCEDURE — 250N000011 HC RX IP 250 OP 636

## 2024-08-27 PROCEDURE — 250N000012 HC RX MED GY IP 250 OP 636 PS 637

## 2024-08-27 PROCEDURE — 250N000013 HC RX MED GY IP 250 OP 250 PS 637: Performed by: STUDENT IN AN ORGANIZED HEALTH CARE EDUCATION/TRAINING PROGRAM

## 2024-08-27 PROCEDURE — 85027 COMPLETE CBC AUTOMATED: CPT | Performed by: STUDENT IN AN ORGANIZED HEALTH CARE EDUCATION/TRAINING PROGRAM

## 2024-08-27 PROCEDURE — 250N000013 HC RX MED GY IP 250 OP 250 PS 637: Performed by: INTERNAL MEDICINE

## 2024-08-27 RX ORDER — SULFAMETHOXAZOLE/TRIMETHOPRIM 800-160 MG
1 TABLET ORAL 2 TIMES DAILY
Status: DISCONTINUED | OUTPATIENT
Start: 2024-08-27 | End: 2024-08-29

## 2024-08-27 RX ORDER — VANCOMYCIN HYDROCHLORIDE 1 G/200ML
1000 INJECTION, SOLUTION INTRAVENOUS EVERY 12 HOURS
Status: DISCONTINUED | OUTPATIENT
Start: 2024-08-27 | End: 2024-08-28

## 2024-08-27 RX ADMIN — DAPSONE 50 MG: 25 TABLET ORAL at 09:16

## 2024-08-27 RX ADMIN — SULFAMETHOXAZOLE AND TRIMETHOPRIM 1 TABLET: 400; 80 TABLET ORAL at 09:18

## 2024-08-27 RX ADMIN — TACROLIMUS 3 MG: 1 CAPSULE ORAL at 20:04

## 2024-08-27 RX ADMIN — MAGNESIUM OXIDE TAB 400 MG (241.3 MG ELEMENTAL MG) 400 MG: 400 (241.3 MG) TAB at 09:16

## 2024-08-27 RX ADMIN — ACETAMINOPHEN 650 MG: 325 TABLET ORAL at 20:06

## 2024-08-27 RX ADMIN — AMLODIPINE BESYLATE 10 MG: 5 TABLET ORAL at 09:17

## 2024-08-27 RX ADMIN — INSULIN GLARGINE 18 UNITS: 100 INJECTION, SOLUTION SUBCUTANEOUS at 09:23

## 2024-08-27 RX ADMIN — HYDROMORPHONE HYDROCHLORIDE 2 MG: 2 TABLET ORAL at 03:31

## 2024-08-27 RX ADMIN — ACYCLOVIR 400 MG: 400 TABLET ORAL at 20:06

## 2024-08-27 RX ADMIN — HYDROMORPHONE HYDROCHLORIDE 2 MG: 2 TABLET ORAL at 22:20

## 2024-08-27 RX ADMIN — CLOPIDOGREL BISULFATE 75 MG: 75 TABLET ORAL at 09:17

## 2024-08-27 RX ADMIN — HYDROMORPHONE HYDROCHLORIDE 2 MG: 2 TABLET ORAL at 16:15

## 2024-08-27 RX ADMIN — FLUDROCORTISONE ACETATE 0.1 MG: 0.1 TABLET ORAL at 09:18

## 2024-08-27 RX ADMIN — Medication 1 TABLET: at 09:16

## 2024-08-27 RX ADMIN — PREDNISONE 5 MG: 5 TABLET ORAL at 09:17

## 2024-08-27 RX ADMIN — HYDROMORPHONE HYDROCHLORIDE 2 MG: 2 TABLET ORAL at 09:31

## 2024-08-27 RX ADMIN — THERA TABS 1 TABLET: TAB at 09:16

## 2024-08-27 RX ADMIN — LISINOPRIL 40 MG: 20 TABLET ORAL at 09:18

## 2024-08-27 RX ADMIN — CARVEDILOL 6.25 MG: 6.25 TABLET, FILM COATED ORAL at 09:19

## 2024-08-27 RX ADMIN — PIPERACILLIN AND TAZOBACTAM 3.38 G: 3; .375 INJECTION, POWDER, FOR SOLUTION INTRAVENOUS at 14:02

## 2024-08-27 RX ADMIN — VANCOMYCIN HYDROCHLORIDE 1000 MG: 1 INJECTION, SOLUTION INTRAVENOUS at 09:35

## 2024-08-27 RX ADMIN — Medication 60 ML: at 09:22

## 2024-08-27 RX ADMIN — FUROSEMIDE 20 MG: 20 TABLET ORAL at 09:17

## 2024-08-27 RX ADMIN — SULFAMETHOXAZOLE AND TRIMETHOPRIM 1 TABLET: 800; 160 TABLET ORAL at 20:05

## 2024-08-27 RX ADMIN — INSULIN ASPART 1 UNITS: 100 INJECTION, SOLUTION INTRAVENOUS; SUBCUTANEOUS at 11:52

## 2024-08-27 RX ADMIN — PIPERACILLIN AND TAZOBACTAM 3.38 G: 3; .375 INJECTION, POWDER, FOR SOLUTION INTRAVENOUS at 06:38

## 2024-08-27 RX ADMIN — FLUTICASONE FUROATE AND VILANTEROL TRIFENATATE 1 PUFF: 100; 25 POWDER RESPIRATORY (INHALATION) at 09:20

## 2024-08-27 RX ADMIN — MAGNESIUM OXIDE TAB 400 MG (241.3 MG ELEMENTAL MG) 400 MG: 400 (241.3 MG) TAB at 20:06

## 2024-08-27 RX ADMIN — PANTOPRAZOLE SODIUM 40 MG: 20 TABLET, DELAYED RELEASE ORAL at 09:17

## 2024-08-27 RX ADMIN — PIPERACILLIN AND TAZOBACTAM 3.38 G: 3; .375 INJECTION, POWDER, FOR SOLUTION INTRAVENOUS at 22:20

## 2024-08-27 RX ADMIN — ACETAMINOPHEN 650 MG: 325 TABLET ORAL at 03:31

## 2024-08-27 RX ADMIN — CARVEDILOL 6.25 MG: 6.25 TABLET, FILM COATED ORAL at 17:59

## 2024-08-27 RX ADMIN — Medication 1 TABLET: at 17:59

## 2024-08-27 RX ADMIN — ACETAMINOPHEN 650 MG: 325 TABLET ORAL at 09:31

## 2024-08-27 RX ADMIN — VANCOMYCIN HYDROCHLORIDE 1000 MG: 1 INJECTION, SOLUTION INTRAVENOUS at 20:51

## 2024-08-27 RX ADMIN — ACYCLOVIR 400 MG: 400 TABLET ORAL at 09:18

## 2024-08-27 RX ADMIN — TACROLIMUS 3 MG: 1 CAPSULE ORAL at 09:18

## 2024-08-27 RX ADMIN — PREDNISONE 2.5 MG: 2.5 TABLET ORAL at 20:06

## 2024-08-27 RX ADMIN — ASPIRIN 81 MG: 81 TABLET, COATED ORAL at 09:17

## 2024-08-27 ASSESSMENT — ACTIVITIES OF DAILY LIVING (ADL)
ADLS_ACUITY_SCORE: 43
ADLS_ACUITY_SCORE: 43
ADLS_ACUITY_SCORE: 41
ADLS_ACUITY_SCORE: 41
ADLS_ACUITY_SCORE: 42
ADLS_ACUITY_SCORE: 43
ADLS_ACUITY_SCORE: 41
ADLS_ACUITY_SCORE: 43
ADLS_ACUITY_SCORE: 41
ADLS_ACUITY_SCORE: 42
ADLS_ACUITY_SCORE: 41
ADLS_ACUITY_SCORE: 43
ADLS_ACUITY_SCORE: 43
ADLS_ACUITY_SCORE: 42
ADLS_ACUITY_SCORE: 43
ADLS_ACUITY_SCORE: 42
ADLS_ACUITY_SCORE: 43
ADLS_ACUITY_SCORE: 43

## 2024-08-27 NOTE — PROGRESS NOTES
[facility]    Medicine Progress Note - Hospitalist Service    Date of Admission:  8/25/2024    Assessment & Plan   Aubrey Duncan is a 69 year old male with PMH lung transplant (2013), PAD, chronic limb threatening ischemia, CAD, HTN, HLD, COPD, GERD, DM type II, and CKD is admitted on 8/25/2024 for right lower extremity wound infection.     Right lower extremity wound infection  -Wound dehiscence positive for purulent drainage  -Wound culture: stenotrophomonas, Enterococcus faecalis preliminary results   -Infectious disease consulted  -continue IV vancomycin, Bactrim and Zosyn pending ID recs and culture results  -question MRI to rule out osteo, defer to ID as AKA is already scheduled for 8/28/24  -Blood cultures pending  -Vascular consulted  -WOC consult placed   -PTA Tylenol, Dilaudid for pain control      SHELLEY on CKD- resolved   Lactic acidosis- resolved   -Trend BMP    PAD  -PTA aspirin, clopidogrel  -vascular following, will make NPO at midnight for AKA     Macrocytic anemia  -no s/s of bleed  -stable hgb  -likely combination of dilutional and immunosuppressive   -continue to monitor      DM type II  -Hemoglobin A1c 4.2   -continue PTA Lantus  -Sliding scale insulin  -hypoglycemia protocol      CAD  -PTA aspirin, carvedilol     HTN  -PTA amlodipine, lasix   -can hold lisinopril tomorrow for surgery  -pain management    HLD  -PTA rosuvastatin     Hx lung transplant  -continue acyclovir, dapsone ppx  -holding PTA azithromycin 3x weekly while on empiric abx as noted above   -continue PTA prednisone and tacrolimus unless s/s of progressing infection  -will continue PTA fludrocortisone (taking due to CNI related hyperkalemia)   -continue home inhalers         Diet: Combination Diet Regular Diet Adult  Snacks/Supplements Adult: Glucerna; Between Meals    DVT Prophylaxis: SCDs  Naranjo Catheter: Not present  Lines: None     Cardiac Monitoring: None  Code Status: Full Code      Clinically  Significant Risk Factors        # Hyperkalemia: Highest K = 5.4 mmol/L in last 2 days, will monitor as appropriate       # Hypoalbuminemia: Lowest albumin = 2.8 g/dL at 8/27/2024  5:22 AM, will monitor as appropriate     # Hypertension: Noted on problem list              # Severe Malnutrition: based on nutrition assessment, PRESENT ON ADMISSION   # Financial/Environmental Concerns: none               Disposition Plan     Medically Ready for Discharge: Anticipated in 2-4 Days             Kenneth Sales DO  Hospitalist Service  [facility]  Securely message with [email or web address] (more info)  Text page via [employer] Paging/Directory   ______________________________________________________________________    Interval History   Patient watching [place] state fair news this AM comfortably without concerns. No fevers or chills and pain is stable.     Physical Exam   Vital Signs: Temp: 97.8  F (36.6  C) Temp src: Oral BP: (!) 170/88 Pulse: 79   Resp: 20 SpO2: 97 % O2 Device: None (Room air)    Weight: 149 lbs 4.02 oz    General Appearance: Alert, nontoxic  Respiratory: CTAB, breathing comfortably on room air  Cardiovascular: RRR, no murmur   GI: no pain  Skin: left LE with black eschar on big toe, wound dehiscence of LLE wound with purulent drainage, no probe to bone and no significant surrounding erythema   Other: Alert and oriented      Medical Decision Making       50 MINUTES SPENT BY ME on the date of service doing chart review, history, exam, documentation & further activities per the note.      Data     I have personally reviewed the following data over the past 24 hrs:    8.2  \   9.6 (L)   / 270     139 104 22.0 /  86   4.8 26 0.82 \     ALT: 20 AST: 32 AP: 109 TBILI: 0.3   ALB: 2.8 (L) TOT PROTEIN: 5.3 (L) LIPASE: N/A       Imaging results reviewed over the past 24 hrs:   No results found for this or any previous visit (from the past 24 hour(s)).

## 2024-08-27 NOTE — PROGRESS NOTES
[facility]    Infectious Disease Progress Note    Date of Service : 08/27/2024     Assessment & Plan   Aubrey Duncan is a 69 year old male who was admitted on 8/25/2024.     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  Scheduled for above-knee amputation on 8/29/2024  Comorbid conditions: Immunosuppression, peripheral vascular disease, chronic kidney disease, type 2 diabetes      RECOMMENDATIONS:    Antibiotics-trimethoprim/sulfamethoxazole, dosing discussed with pharmacy, continue Zosyn, vancomycin  Follow culture results   Focus/de-escalate antibiotics based on final culture results  Monitor CBC, CMP  Prophylaxis-dapsone, azithromycin-prior to admission  Discussed with patient   Plan for BKA on 8/29/2024, see vascular surgery notes  ID will follow      Patricia Palacios MD  [facility]  [phone]      Interval History   Pain control  Wife at bedside and updated  Antibiotic management discussed with pharmacy      Physical Exam   Temp: 97.8  F (36.6  C) Temp src: Oral BP: (!) 170/88 Pulse: 79   Resp: 20 SpO2: 97 % O2 Device: None (Room air)    Vitals:    08/25/24 1554 08/27/24 0749   Weight: 64.9 kg (143 lb) 67.7 kg (149 lb 4 oz)     Vital Signs with Ranges  Temp:  [97.7  F (36.5  C)-98  F (36.7  C)] 97.8  F (36.6  C)  Pulse:  [76-88] 79  Resp:  [18-20] 20  BP: (125-170)/(70-88) 170/88  SpO2:  [95 %-97 %] 97 %    Constitutional: Awake, no apparent distress  Lungs: normal breathing pattern, no crackles or wheezing  Cardiovascular: Regular rate and rhythm, S1 S2  Abdomen: non distended  Skin: Dressing on lower extremity  Neuro: deconditioned  Psych: able to answer questions    Medications   Current Facility-Administered Medications   Medication Dose Route Frequency Provider Last Rate  Last Admin     Current Facility-Administered Medications   Medication Dose Route Frequency Provider Last Rate Last Admin    acyclovir (ZOVIRAX) tablet 400 mg  400 mg Oral BID LabolMiranda murray NP   400 mg at 08/27/24 0918    amLODIPine (NORVASC) tablet 10 mg  10 mg Oral Daily LabolMiranda murray NP   10 mg at 08/27/24 0917    aspirin EC tablet 81 mg  81 mg Oral Daily LaboltMiranda NP   81 mg at 08/27/24 0917    calcium carbonate-vitamin D (OSCAL) 500-5 MG-MCG per tablet 1 tablet  1 tablet Oral BID w/meals LabolMiranda murray NP   1 tablet at 08/27/24 0916    carvedilol (COREG) tablet 6.25 mg  6.25 mg Oral BID w/meals LabolMiranda murray NP   6.25 mg at 08/27/24 0919    clopidogrel (PLAVIX) tablet 75 mg  75 mg Oral Daily LabolMiranda murray NP   75 mg at 08/27/24 0917    dapsone (ACZONE) tablet 50 mg  50 mg Oral Daily LabolMiranda murray NP   50 mg at 08/27/24 0916    fludrocortisone (FLORINEF) tablet 0.1 mg  0.1 mg Oral Daily LabolMiranda murray NP   0.1 mg at 08/27/24 0918    fluticasone-vilanterol (BREO ELLIPTA) 100-25 MCG/ACT inhaler 1 puff  1 puff Inhalation Daily LabMiranda morales NP   1 puff at 08/27/24 0920    furosemide (LASIX) tablet 20 mg  20 mg Oral Daily Kenneth Sales DO   20 mg at 08/27/24 0917    insulin aspart (NovoLOG) injection (RAPID ACTING)  1-7 Units Subcutaneous TID  Miranda Sol NP   1 Units at 08/27/24 1152    insulin aspart (NovoLOG) injection (RAPID ACTING)  1-5 Units Subcutaneous At Bedtime Miranda Sol NP        insulin glargine (LANTUS PEN) injection 18 Units  18 Units Subcutaneous QAM  Kenneth Sales DO   18 Units at 08/27/24 0923    [Held by provider] lisinopril (ZESTRIL) tablet 40 mg  40 mg Oral Daily Kenneth Sales DO   40 mg at 08/27/24 0918    magnesium oxide (MAG-OX) tablet 400 mg  400 mg Oral BID Miranda Sol NP   400 mg at 08/27/24 0916    multivitamin, therapeutic (THERA-VIT) tablet 1 tablet  1 tablet Oral Daily Miranda Sol NP    1 tablet at 08/27/24 0916    pantoprazole (PROTONIX) EC tablet 40 mg  40 mg Oral Daily LaboltMiranda K, NP   40 mg at 08/27/24 0917    piperacillin-tazobactam (ZOSYN) 3.375 g vial to attach to  mL bag  3.375 g Intravenous Q8H LaboltMiranda, NP   3.375 g at 08/27/24 0638    predniSONE (DELTASONE) tablet 2.5 mg  2.5 mg Oral QPM Labolt, Miranda K, NP   2.5 mg at 08/26/24 2000    predniSONE (DELTASONE) tablet 5 mg  5 mg Oral QAM Labolt, Miranda K, NP   5 mg at 08/27/24 0917    rosuvastatin (CRESTOR) tablet 20 mg  20 mg Oral Q Mon Wed Fri AM Labolt, Miranda K, NP   20 mg at 08/26/24 0931    sodium chloride (PF) 0.9% PF flush 3 mL  3 mL Intracatheter Q8H LaboltMiranda, NP   3 mL at 08/27/24 1152    sulfamethoxazole-trimethoprim (BACTRIM DS) 800-160 MG per tablet 1 tablet  1 tablet Oral BID Patricia Palacios MD        tacrolimus (GENERIC EQUIVALENT) capsule 3 mg  3 mg Oral QAM LaboltMiranda, NP   3 mg at 08/27/24 0918    And    tacrolimus (GENERIC EQUIVALENT) capsule 3 mg  3 mg Oral QPM LaboltMiranda, NP   3 mg at 08/26/24 2000    vancomycin (VANCOCIN) 1,000 mg in 200 mL dextrose intermittent infusion  1,000 mg Intravenous Q12H Sales, Kenneth,  mL/hr at 08/27/24 0935 1,000 mg at 08/27/24 0935    wound support modular (EXPEDITE) bottle 60 mL  60 mL Oral Daily Sales, Kenneth, DO   60 mL at 08/27/24 0922       Data   All microbiology laboratory data reviewed.  Recent Labs   Lab Test 08/27/24  0522 08/26/24  0528 08/25/24  1632   WBC 8.2 8.2 8.3   HGB 9.6* 9.6* 10.5*   HCT 30.3* 31.4* 34.0*   * 103* 104*    279 359     Recent Labs   Lab Test 08/27/24  0522 08/26/24  0528 08/25/24  1632   CR 0.82 1.11 1.85*     Recent Labs   Lab Test 08/25/24  1632   SED 52*     Recent Labs   Lab Test 07/06/21  1101 11/27/20  1430 10/16/20  0939 09/11/20  0818 07/16/20  0856 07/02/20  1042 06/30/20  1056 02/27/19  0737 02/25/19  1814   CULT Heavy growth  Klebsiella pneumoniae  *  Heavy  "growth  Pseudomonas aeruginosa  * Moderate growth  Pseudomonas aeruginosa  * Heavy growth  Pseudomonas aeruginosa  Some antibiotics have been suppressed due to the known inducible beta lactamase activity.   Please contact Microbiology for more information.  * Heavy growth  Escherichia coli  *  Heavy growth  Enterococcus faecalis  * Moderate growth  Stenotrophomonas maltophilia  * No growth after 6 days  No growth after 6 days Heavy growth  Klebsiella pneumoniae  * Canceled, Test credited  >10 Squamous epithelial cells/low power field indicates oral contamination. Please   recollect.  *  Notification of test cancellation was given to   SOHA VALLE RN @0951 2/27/19. CT   No growth       MICROBIOLOGY:    Reviewed    7-Day Micro Results       Collected Updated Procedure Result Status      08/25/2024 1702 08/27/2024 1156 Wound Aerobic Bacterial Culture Routine With Gram Stain [[id number]]   (Abnormal)   Wound from Leg, Below Knee, Right    Preliminary result Component Value   Culture Culture in progress  [P]     4+ Stenotrophomonas maltophilia  [P]     1+ Enterococcus faecalis  [P]     4+ Normal jcarlos  [P]    Gram Stain Result 4+ Gram negative bacilli  [P]     4+ Gram positive cocci  [P]     2+ WBC seen  [P]                08/25/2024 1643 08/26/2024 1816 Blood Culture Arm, Right [[id number]]   Blood from Arm, Right    Preliminary result Component Value   Culture No growth after 1 day  [P]                         RADIOLOGY:    Reviewed  POC US Guidance Needle Placement    Result Date: 8/1/2024  Ultrasound was performed as guidance to an anesthesia procedure.  Click \"PACS images\" hyperlink below to view any stored images.  For specific procedure details, view procedure note authored by anesthesia.     Attestation:  Total time on the floor involved in the patient's care: minutes. Chart reviewed, reviewed cultures, external notes, labs, antiinfective monitoring, evaluation, counseling, management    Medical " Decision Making       50 MINUTES SPENT BY ME on the date of service doing chart review, history, exam, documentation & further activities per the note.  MANAGEMENT DISCUSSED with the following over the past 24 hours: reviewed   NOTE(S)/MEDICAL RECORDS REVIEWED over the past 24 hours: reviewed  Tests ORDERED & REVIEWED in the past 24 hours:  - See lab/imaging results included in the data section of the note  Medical complexity over the past 24 hours:  - Intensive monitoring for MEDICATION TOXICITY  - Decision regarding ESCALATION OF LEVEL OF CARE

## 2024-08-27 NOTE — PROGRESS NOTES
"Care Management Follow Up    Length of Stay (days): 2    Expected Discharge Date: 09/02/2024    Anticipated Discharge Plan:  Transitional Care    Transportation: TBD    PT Recommendations:    OT Recommendations:        Barriers to Discharge: medical stability, surgery 8/29    Prior Living Situation: house (\"We live in a double wide. It has a ramp to get inside the house and then it is all 1 level inside. It is at [address] [place] [address].\" The facesheet lists a PO box for the mailing address.) with spouse, parent(s)    Discussed  Partnership in Safe Discharge Planning  document with patient/family: No     Handoff Completed: No, handoff not indicated or clinically appropriate    Patient/Spokesperson Updated: No    Additional Information:  Pt having surgery 8/29. Likely needs TCU, referrals pending.     Next Steps: Follow for PT/OT recs after surgery, follow up on TCU referrals when appropriate.     Jhoana De La Rosa, BSW      "

## 2024-08-27 NOTE — PLAN OF CARE
Patient had a calm restful evening. Patient denied pain until later in evening after ambulating in hallway. Patient intake good and voiding using urinal. Patient encouraged to use call light for assistance.. Continue to monitor Blood glucose level. Did not need insulin coverage this evening.  Problem: Adult Inpatient Plan of Care  Goal: Absence of Hospital-Acquired Illness or Injury  Intervention: Identify and Manage Fall Risk  Recent Flowsheet Documentation  Taken 8/26/2024 2030 by Ida Ventura, RN  Safety Promotion/Fall Prevention:   activity supervised   assistive device/personal items within reach   mobility aid in reach   nonskid shoes/slippers when out of bed   safety round/check completed  Taken 8/26/2024 1620 by Ida Ventura, RN  Safety Promotion/Fall Prevention:   activity supervised   assistive device/personal items within reach   mobility aid in reach   nonskid shoes/slippers when out of bed   safety round/check completed     Problem: Adult Inpatient Plan of Care  Goal: Optimal Comfort and Wellbeing  Outcome: Progressing     Problem: Pain Acute  Goal: Optimal Pain Control and Function  Outcome: Progressing     Problem: Glycemic Control Impaired  Goal: Blood Glucose Level Within Targeted Range  Outcome: Progressing     Problem: Mobility Impairment  Goal: Optimal Mobility  Outcome: Progressing   Goal Outcome Evaluation:

## 2024-08-27 NOTE — PHARMACY-VANCOMYCIN DOSING SERVICE
Pharmacy Vancomycin Note  Date of Service 2024  Patient's  1955   69 year old, male    Indication: Sepsis and Skin and Soft Tissue Infection  Day of Therapy: 3  Current vancomycin regimen:  1250 mg IV q24h  Current vancomycin monitoring method: AUC  Current vancomycin therapeutic monitoring goal: 400-600 mg*h/L    InsightRX Prediction of Current Vancomycin Regimen  Loading dose: N/A  Regimen: 1250 mg IV every 24 hours.  Start time: 11:59 on 2024  Exposure target: AUC24 (range)400-600 mg/L.hr   AUC24,ss: 304 mg/L.hr  Probability of AUC24 > 400: 5 %  Ctrough,ss: 6.7 mg/L  Probability of Ctrough,ss > 20: 0 %  Probability of nephrotoxicity (Lodise SEYMOUR ): 4 %    Current estimated CrCl = Estimated Creatinine Clearance: 75.8 mL/min (based on SCr of 0.82 mg/dL).    Creatinine for last 3 days  2024:  4:32 PM Creatinine 1.85 mg/dL  2024:  5:28 AM Creatinine 1.11 mg/dL  2024:  5:22 AM Creatinine 0.82 mg/dL    Recent Vancomycin Levels (past 3 days)  2024:  5:22 AM Vancomycin 7.7 ug/mL    Vancomycin IV Administrations (past 72 hours)                     vancomycin (VANCOCIN) 1,250 mg in sodium chloride 0.9 % 250 mL intermittent infusion (mg) 1,250 mg New Bag 24 1159    vancomycin (VANCOCIN) 1,250 mg in sodium chloride 0.9 % 250 mL intermittent infusion (mg) 1,250 mg New Bag 24 1731                    Nephrotoxins and other renal medications (From now, onward)      Start     Dose/Rate Route Frequency Ordered Stop    24 1200  vancomycin (VANCOCIN) 1,250 mg in sodium chloride 0.9 % 250 mL intermittent infusion         1,250 mg  over 90 Minutes Intravenous EVERY 24 HOURS 24 0839      24 0800  furosemide (LASIX) tablet 20 mg        Note to Pharmacy: PTA Sig:Take 1 tablet (20 mg) by mouth daily      20 mg Oral DAILY 24 0800  lisinopril (ZESTRIL) tablet 40 mg        Note to Pharmacy: PTA Sig:TAKE 1 TABLET (40 MG) BY MOUTH DAILY IN  "THE MORNING      40 mg Oral DAILY 08/25/24 2018 08/26/24 0800  tacrolimus (GENERIC EQUIVALENT) capsule 3 mg        Note to Pharmacy: PTA Sig:Take 3 capsules (3 mg) by mouth every morning AND 3 capsules (3 mg) every evening. Total dose: 3 mg in AM and 3 mg in PM     Placed in \"And\" Linked Group    3 mg Oral EVERY MORNING 08/25/24 2018 08/25/24 2230  piperacillin-tazobactam (ZOSYN) 3.375 g vial to attach to  mL bag        Note to Pharmacy: For [facility], [facility] and [facility]: For Zosyn-naive patients, use the \"Zosyn initial dose + extended infusion\" order panel.    3.375 g  over 240 Minutes Intravenous EVERY 8 HOURS 08/25/24 1951 08/25/24 2200  acyclovir (ZOVIRAX) tablet 400 mg        Note to Pharmacy: PTA Sig:TAKE 1 TABLET (400 MG) BY MOUTH 2 TIMES DAILY      400 mg Oral 2 TIMES DAILY 08/25/24 2018 08/25/24 2200  tacrolimus (GENERIC EQUIVALENT) capsule 3 mg        Note to Pharmacy: PTA Sig:Take 3 capsules (3 mg) by mouth every morning AND 3 capsules (3 mg) every evening. Total dose: 3 mg in AM and 3 mg in PM     Placed in \"And\" Linked Group    3 mg Oral EVERY EVENING 08/25/24 2018                 Contrast Orders - past 72 hours (72h ago, onward)      None            Interpretation of levels and current regimen:  Vancomycin level is reflective of AUC less than 400    Has serum creatinine changed greater than 50% in last 72 hours: Yes    Urine output:  good urine output    Renal Function: Significantly Improved    InsightRX Prediction of Planned New Vancomycin Regimen  Loading dose: N/A  Regimen: 1000 mg IV every 12 hours.  Start time: 11:59 on 08/27/2024  Exposure target: AUC24 (range)400-600 mg/L.hr   AUC24,ss: 482 mg/L.hr  Probability of AUC24 > 400: 87 %  Ctrough,ss: 13.8 mg/L  Probability of Ctrough,ss > 20: 6 %  Probability of nephrotoxicity (Lodise SEYMOUR 2009): 9 %    Plan:  Increase Dose to 1000 mg IV q12h  Vancomycin monitoring method: AUC  Vancomycin therapeutic monitoring goal: 400-600 " mg*h/L  Pharmacy will check vancomycin levels as appropriate in 1-3 Days.  Serum creatinine levels will be ordered daily for the first week of therapy and at least twice weekly for subsequent weeks.    Jie Ramires RPH

## 2024-08-27 NOTE — PLAN OF CARE
Problem: Adult Inpatient Plan of Care  Goal: Plan of Care Review  Description: The Plan of Care Review/Shift note should be completed every shift.  The Outcome Evaluation is a brief statement about your assessment that the patient is improving, declining, or no change.  This information will be displayed automatically on your shift  note.  Outcome: Progressing     Problem: Adult Inpatient Plan of Care  Goal: Absence of Hospital-Acquired Illness or Injury  Outcome: Progressing     Problem: Adult Inpatient Plan of Care  Goal: Absence of Hospital-Acquired Illness or Injury  Intervention: Prevent Skin Injury  Recent Flowsheet Documentation  Taken 8/27/2024 1200 by Jonny Barrios, RN  Body Position: position changed independently  Taken 8/27/2024 0800 by Jonny Barrios, RN  Body Position: position changed independently     Problem: Adult Inpatient Plan of Care  Goal: Absence of Hospital-Acquired Illness or Injury  Intervention: Prevent Infection  Recent Flowsheet Documentation  Taken 8/27/2024 1200 by Jonny Barrios, RN  Infection Prevention: hand hygiene promoted  Taken 8/27/2024 0800 by Jonny Barrios, RN  Infection Prevention: hand hygiene promoted   Goal Outcome Evaluation:       Patient alert oriented x 4,  right leg wound dressing changed, PRN Dilaudid given this shift, continue IV ABX's, NPO at midnight for leg amputation tomorrow, family at bedside.

## 2024-08-27 NOTE — PROGRESS NOTES
Surgery Update    Per Dr. Sneed, move to [facility] due to meetings scheduled for Dr. Sneed.     Writer called the patient, the pt is okay with switching providers.    Writer called OR scheduling, spoke to [facility]. Space available tomorrow 8/28/2024 to do procedure. Case has been moved.     Writer called inpatient unit, spoke to KATHERYN Fuller. Unit aware the pt's procedure has been moved to tomorrow.

## 2024-08-27 NOTE — PLAN OF CARE
Problem: Adult Inpatient Plan of Care  Goal: Plan of Care Review  Description: The Plan of Care Review/Shift note should be completed every shift.  The Outcome Evaluation is a brief statement about your assessment that the patient is improving, declining, or no change.  This information will be displayed automatically on your shift  note.  Outcome: Progressing  Flowsheets (Taken 8/27/2024 0826)  Plan of Care Reviewed With: patient  Overall Patient Progress: improving     Problem: Infection  Goal: Absence of Infection Signs and Symptoms  Outcome: Progressing     Problem: Pain Acute  Goal: Optimal Pain Control and Function  Outcome: Progressing  Intervention: Prevent or Manage Pain  Recent Flowsheet Documentation  Taken 8/27/2024 0300 by Cora Reynolds RN  Medication Review/Management: medications reviewed  Intervention: Optimize Psychosocial Wellbeing  Recent Flowsheet Documentation  Taken 8/27/2024 0300 by Cora Reynolds RN  Supportive Measures:   active listening utilized   decision-making supported   positive reinforcement provided   relaxation techniques promoted   self-care encouraged   verbalization of feelings encouraged     Problem: Glycemic Control Impaired  Goal: Blood Glucose Level Within Targeted Range  Outcome: Progressing   Goal Outcome Evaluation:      Plan of Care Reviewed With: patient    Overall Patient Progress: improvingOverall Patient Progress: improving     Pt was medicated x1 with good relief, right leg dressing intact, pt able to move well in bed with help.Voiding well per urinal.

## 2024-08-28 ENCOUNTER — ANESTHESIA (OUTPATIENT)
Dept: SURGERY | Facility: HOSPITAL | Age: 69
DRG: 856 | End: 2024-08-28
Payer: MEDICARE

## 2024-08-28 ENCOUNTER — ANESTHESIA EVENT (OUTPATIENT)
Dept: SURGERY | Facility: HOSPITAL | Age: 69
DRG: 856 | End: 2024-08-28
Payer: MEDICARE

## 2024-08-28 ENCOUNTER — APPOINTMENT (OUTPATIENT)
Dept: RADIOLOGY | Facility: HOSPITAL | Age: 69
DRG: 856 | End: 2024-08-28
Payer: MEDICARE

## 2024-08-28 LAB
ANION GAP SERPL CALCULATED.3IONS-SCNC: 11 MMOL/L (ref 7–15)
ANION GAP SERPL CALCULATED.3IONS-SCNC: 9 MMOL/L (ref 7–15)
BACTERIA WND CULT: ABNORMAL
BASE EXCESS BLDA CALC-SCNC: -1.9 MMOL/L (ref -3–3)
BASOPHILS # BLD MANUAL: 0 10E3/UL (ref 0–0.2)
BASOPHILS NFR BLD MANUAL: 0 %
BUN SERPL-MCNC: 19.5 MG/DL (ref 8–23)
BUN SERPL-MCNC: 22.2 MG/DL (ref 8–23)
CALCIUM SERPL-MCNC: 8.1 MG/DL (ref 8.8–10.4)
CALCIUM SERPL-MCNC: 8.3 MG/DL (ref 8.8–10.4)
CHLORIDE SERPL-SCNC: 102 MMOL/L (ref 98–107)
CHLORIDE SERPL-SCNC: 104 MMOL/L (ref 98–107)
COHGB MFR BLD: 93.7 % (ref 95–96)
CREAT SERPL-MCNC: 0.9 MG/DL (ref 0.67–1.17)
CREAT SERPL-MCNC: 0.94 MG/DL (ref 0.67–1.17)
EGFRCR SERPLBLD CKD-EPI 2021: 87 ML/MIN/1.73M2
EGFRCR SERPLBLD CKD-EPI 2021: >90 ML/MIN/1.73M2
EOSINOPHIL # BLD MANUAL: 0.3 10E3/UL (ref 0–0.7)
EOSINOPHIL NFR BLD MANUAL: 3 %
ERYTHROCYTE [DISTWIDTH] IN BLOOD BY AUTOMATED COUNT: 14.4 % (ref 10–15)
ERYTHROCYTE [DISTWIDTH] IN BLOOD BY AUTOMATED COUNT: 14.5 % (ref 10–15)
GLUCOSE BLDC GLUCOMTR-MCNC: 100 MG/DL (ref 70–99)
GLUCOSE BLDC GLUCOMTR-MCNC: 100 MG/DL (ref 70–99)
GLUCOSE BLDC GLUCOMTR-MCNC: 106 MG/DL (ref 70–99)
GLUCOSE BLDC GLUCOMTR-MCNC: 172 MG/DL (ref 70–99)
GLUCOSE BLDC GLUCOMTR-MCNC: 76 MG/DL (ref 70–99)
GLUCOSE SERPL-MCNC: 107 MG/DL (ref 70–99)
GLUCOSE SERPL-MCNC: 97 MG/DL (ref 70–99)
GRAM STAIN RESULT: ABNORMAL
HCO3 BLD-SCNC: 23 MMOL/L (ref 21–28)
HCO3 SERPL-SCNC: 25 MMOL/L (ref 22–29)
HCO3 SERPL-SCNC: 25 MMOL/L (ref 22–29)
HCT VFR BLD AUTO: 27.9 % (ref 40–53)
HCT VFR BLD AUTO: 33.6 % (ref 40–53)
HGB BLD-MCNC: 10.5 G/DL (ref 13.3–17.7)
HGB BLD-MCNC: 8.5 G/DL (ref 13.3–17.7)
LYMPHOCYTES # BLD MANUAL: 1.5 10E3/UL (ref 0.8–5.3)
LYMPHOCYTES NFR BLD MANUAL: 18 %
MCH RBC QN AUTO: 31.7 PG (ref 26.5–33)
MCH RBC QN AUTO: 32 PG (ref 26.5–33)
MCHC RBC AUTO-ENTMCNC: 30.5 G/DL (ref 31.5–36.5)
MCHC RBC AUTO-ENTMCNC: 31.3 G/DL (ref 31.5–36.5)
MCV RBC AUTO: 102 FL (ref 78–100)
MCV RBC AUTO: 105 FL (ref 78–100)
MONOCYTES # BLD MANUAL: 0.7 10E3/UL (ref 0–1.3)
MONOCYTES NFR BLD MANUAL: 8 %
NEUTROPHILS # BLD MANUAL: 6 10E3/UL (ref 1.6–8.3)
NEUTROPHILS NFR BLD MANUAL: 71 %
NRBC # BLD AUTO: 0 10E3/UL
NRBC BLD AUTO-RTO: 0 /100
O2/TOTAL GAS SETTING VFR VENT: 85 %
PCO2 BLD: 39 MM HG (ref 35–45)
PH BLD: 7.39 [PH] (ref 7.35–7.45)
PLAT MORPH BLD: NORMAL
PLATELET # BLD AUTO: 267 10E3/UL (ref 150–450)
PLATELET # BLD AUTO: 302 10E3/UL (ref 150–450)
PO2 BLD: 77 MM HG (ref 80–105)
POTASSIUM SERPL-SCNC: 4.4 MMOL/L (ref 3.4–5.3)
POTASSIUM SERPL-SCNC: 4.5 MMOL/L (ref 3.4–5.3)
RBC # BLD AUTO: 2.66 10E6/UL (ref 4.4–5.9)
RBC # BLD AUTO: 3.31 10E6/UL (ref 4.4–5.9)
RBC MORPH BLD: NORMAL
SAO2 % BLDA: 91 % (ref 92–100)
SODIUM SERPL-SCNC: 138 MMOL/L (ref 135–145)
SODIUM SERPL-SCNC: 138 MMOL/L (ref 135–145)
WBC # BLD AUTO: 3.3 10E3/UL (ref 4–11)
WBC # BLD AUTO: 8.5 10E3/UL (ref 4–11)

## 2024-08-28 PROCEDURE — 250N000009 HC RX 250: Performed by: INTERNAL MEDICINE

## 2024-08-28 PROCEDURE — 258N000003 HC RX IP 258 OP 636: Performed by: STUDENT IN AN ORGANIZED HEALTH CARE EDUCATION/TRAINING PROGRAM

## 2024-08-28 PROCEDURE — 250N000011 HC RX IP 250 OP 636: Performed by: NURSE ANESTHETIST, CERTIFIED REGISTERED

## 2024-08-28 PROCEDURE — 250N000009 HC RX 250: Performed by: ANESTHESIOLOGY

## 2024-08-28 PROCEDURE — 250N000009 HC RX 250: Performed by: STUDENT IN AN ORGANIZED HEALTH CARE EDUCATION/TRAINING PROGRAM

## 2024-08-28 PROCEDURE — 272N000001 HC OR GENERAL SUPPLY STERILE: Performed by: SURGERY

## 2024-08-28 PROCEDURE — 85007 BL SMEAR W/DIFF WBC COUNT: CPT | Performed by: SURGERY

## 2024-08-28 PROCEDURE — 88342 IMHCHEM/IMCYTCHM 1ST ANTB: CPT | Mod: TC | Performed by: SURGERY

## 2024-08-28 PROCEDURE — 200N000001 HC R&B ICU

## 2024-08-28 PROCEDURE — 36600 WITHDRAWAL OF ARTERIAL BLOOD: CPT

## 2024-08-28 PROCEDURE — 71045 X-RAY EXAM CHEST 1 VIEW: CPT

## 2024-08-28 PROCEDURE — 250N000013 HC RX MED GY IP 250 OP 250 PS 637: Performed by: STUDENT IN AN ORGANIZED HEALTH CARE EDUCATION/TRAINING PROGRAM

## 2024-08-28 PROCEDURE — 250N000011 HC RX IP 250 OP 636: Performed by: INTERNAL MEDICINE

## 2024-08-28 PROCEDURE — 250N000011 HC RX IP 250 OP 636: Mod: JW | Performed by: ANESTHESIOLOGY

## 2024-08-28 PROCEDURE — 999N000141 HC STATISTIC PRE-PROCEDURE NURSING ASSESSMENT: Performed by: SURGERY

## 2024-08-28 PROCEDURE — 36415 COLL VENOUS BLD VENIPUNCTURE: CPT | Performed by: STUDENT IN AN ORGANIZED HEALTH CARE EDUCATION/TRAINING PROGRAM

## 2024-08-28 PROCEDURE — 85027 COMPLETE CBC AUTOMATED: CPT | Performed by: ANESTHESIOLOGY

## 2024-08-28 PROCEDURE — 85027 COMPLETE CBC AUTOMATED: CPT | Performed by: SURGERY

## 2024-08-28 PROCEDURE — 250N000012 HC RX MED GY IP 250 OP 636 PS 637: Performed by: STUDENT IN AN ORGANIZED HEALTH CARE EDUCATION/TRAINING PROGRAM

## 2024-08-28 PROCEDURE — 99232 SBSQ HOSP IP/OBS MODERATE 35: CPT | Performed by: STUDENT IN AN ORGANIZED HEALTH CARE EDUCATION/TRAINING PROGRAM

## 2024-08-28 PROCEDURE — 250N000013 HC RX MED GY IP 250 OP 250 PS 637

## 2024-08-28 PROCEDURE — 94660 CPAP INITIATION&MGMT: CPT

## 2024-08-28 PROCEDURE — 999N000185 HC STATISTIC TRANSPORT TIME EA 15 MIN

## 2024-08-28 PROCEDURE — 36415 COLL VENOUS BLD VENIPUNCTURE: CPT | Performed by: ANESTHESIOLOGY

## 2024-08-28 PROCEDURE — 250N000013 HC RX MED GY IP 250 OP 250 PS 637: Performed by: INTERNAL MEDICINE

## 2024-08-28 PROCEDURE — 82805 BLOOD GASES W/O2 SATURATION: CPT | Performed by: ANESTHESIOLOGY

## 2024-08-28 PROCEDURE — 250N000011 HC RX IP 250 OP 636: Performed by: ANESTHESIOLOGY

## 2024-08-28 PROCEDURE — 99291 CRITICAL CARE FIRST HOUR: CPT | Performed by: INTERNAL MEDICINE

## 2024-08-28 PROCEDURE — 999N000157 HC STATISTIC RCP TIME EA 10 MIN

## 2024-08-28 PROCEDURE — 258N000003 HC RX IP 258 OP 636: Performed by: ANESTHESIOLOGY

## 2024-08-28 PROCEDURE — 99207 PR NO CHARGE LOS: CPT | Performed by: INTERNAL MEDICINE

## 2024-08-28 PROCEDURE — 250N000012 HC RX MED GY IP 250 OP 636 PS 637

## 2024-08-28 PROCEDURE — 250N000011 HC RX IP 250 OP 636: Performed by: STUDENT IN AN ORGANIZED HEALTH CARE EDUCATION/TRAINING PROGRAM

## 2024-08-28 PROCEDURE — 370N000017 HC ANESTHESIA TECHNICAL FEE, PER MIN: Performed by: SURGERY

## 2024-08-28 PROCEDURE — 80048 BASIC METABOLIC PNL TOTAL CA: CPT | Performed by: SURGERY

## 2024-08-28 PROCEDURE — 360N000076 HC SURGERY LEVEL 3, PER MIN: Performed by: SURGERY

## 2024-08-28 PROCEDURE — 27880 AMPUTATION OF LOWER LEG: CPT | Performed by: ANESTHESIOLOGY

## 2024-08-28 PROCEDURE — 250N000011 HC RX IP 250 OP 636

## 2024-08-28 PROCEDURE — 36415 COLL VENOUS BLD VENIPUNCTURE: CPT | Performed by: SURGERY

## 2024-08-28 PROCEDURE — 250N000009 HC RX 250: Performed by: NURSE ANESTHETIST, CERTIFIED REGISTERED

## 2024-08-28 PROCEDURE — 27880 AMPUTATION OF LOWER LEG: CPT | Mod: 79 | Performed by: SURGERY

## 2024-08-28 PROCEDURE — 0Y6H0Z1 DETACHMENT AT RIGHT LOWER LEG, HIGH, OPEN APPROACH: ICD-10-PCS | Performed by: SURGERY

## 2024-08-28 PROCEDURE — 99100 ANES PT EXTEME AGE<1 YR&>70: CPT | Performed by: NURSE ANESTHETIST, CERTIFIED REGISTERED

## 2024-08-28 PROCEDURE — 710N000009 HC RECOVERY PHASE 1, LEVEL 1, PER MIN: Performed by: SURGERY

## 2024-08-28 PROCEDURE — 5A09357 ASSISTANCE WITH RESPIRATORY VENTILATION, LESS THAN 24 CONSECUTIVE HOURS, CONTINUOUS POSITIVE AIRWAY PRESSURE: ICD-10-PCS | Performed by: INTERNAL MEDICINE

## 2024-08-28 PROCEDURE — 250N000009 HC RX 250: Performed by: SURGERY

## 2024-08-28 PROCEDURE — 27880 AMPUTATION OF LOWER LEG: CPT | Performed by: NURSE ANESTHETIST, CERTIFIED REGISTERED

## 2024-08-28 PROCEDURE — 80048 BASIC METABOLIC PNL TOTAL CA: CPT | Performed by: STUDENT IN AN ORGANIZED HEALTH CARE EDUCATION/TRAINING PROGRAM

## 2024-08-28 PROCEDURE — 94640 AIRWAY INHALATION TREATMENT: CPT

## 2024-08-28 PROCEDURE — 258N000003 HC RX IP 258 OP 636: Performed by: NURSE ANESTHETIST, CERTIFIED REGISTERED

## 2024-08-28 RX ORDER — ROPIVACAINE IN 0.9% SOD CHL/PF 0.1 %
.01-.125 PLASTIC BAG, INJECTION (ML) EPIDURAL CONTINUOUS
Status: DISCONTINUED | OUTPATIENT
Start: 2024-08-28 | End: 2024-08-28

## 2024-08-28 RX ORDER — VASOPRESSIN IN 0.9 % NACL 2 UNIT/2ML
SYRINGE (ML) INTRAVENOUS PRN
Status: DISCONTINUED | OUTPATIENT
Start: 2024-08-28 | End: 2024-08-28

## 2024-08-28 RX ORDER — HYDROMORPHONE HYDROCHLORIDE 4 MG/1
4 TABLET ORAL EVERY 4 HOURS PRN
Status: DISCONTINUED | OUTPATIENT
Start: 2024-08-28 | End: 2024-09-01

## 2024-08-28 RX ORDER — LIDOCAINE 40 MG/G
CREAM TOPICAL
Status: ACTIVE | OUTPATIENT
Start: 2024-08-28 | End: 2024-08-31

## 2024-08-28 RX ORDER — ONDANSETRON 2 MG/ML
4 INJECTION INTRAMUSCULAR; INTRAVENOUS EVERY 6 HOURS PRN
Status: DISCONTINUED | OUTPATIENT
Start: 2024-08-28 | End: 2024-09-04 | Stop reason: HOSPADM

## 2024-08-28 RX ORDER — LIDOCAINE 40 MG/G
CREAM TOPICAL
Status: DISCONTINUED | OUTPATIENT
Start: 2024-08-28 | End: 2024-08-28

## 2024-08-28 RX ORDER — NOREPINEPHRINE BITARTRATE 0.02 MG/ML
INJECTION, SOLUTION INTRAVENOUS
Status: DISCONTINUED
Start: 2024-08-28 | End: 2024-08-29 | Stop reason: HOSPADM

## 2024-08-28 RX ORDER — BISACODYL 10 MG
10 SUPPOSITORY, RECTAL RECTAL DAILY PRN
Status: DISCONTINUED | OUTPATIENT
Start: 2024-08-31 | End: 2024-09-04 | Stop reason: HOSPADM

## 2024-08-28 RX ORDER — EPHEDRINE SULFATE 50 MG/ML
INJECTION, SOLUTION INTRAVENOUS PRN
Status: DISCONTINUED | OUTPATIENT
Start: 2024-08-28 | End: 2024-08-28

## 2024-08-28 RX ORDER — PROPOFOL 10 MG/ML
INJECTION, EMULSION INTRAVENOUS
Status: DISCONTINUED
Start: 2024-08-28 | End: 2024-08-28 | Stop reason: HOSPADM

## 2024-08-28 RX ORDER — HYDROMORPHONE HCL IN WATER/PF 6 MG/30 ML
0.2 PATIENT CONTROLLED ANALGESIA SYRINGE INTRAVENOUS
Status: DISCONTINUED | OUTPATIENT
Start: 2024-08-28 | End: 2024-09-01

## 2024-08-28 RX ORDER — CALCIUM CHLORIDE 100 MG/ML
INJECTION INTRAVENOUS; INTRAVENTRICULAR PRN
Status: DISCONTINUED | OUTPATIENT
Start: 2024-08-28 | End: 2024-08-28

## 2024-08-28 RX ORDER — SODIUM CHLORIDE, SODIUM LACTATE, POTASSIUM CHLORIDE, CALCIUM CHLORIDE 600; 310; 30; 20 MG/100ML; MG/100ML; MG/100ML; MG/100ML
INJECTION, SOLUTION INTRAVENOUS CONTINUOUS
Status: DISCONTINUED | OUTPATIENT
Start: 2024-08-28 | End: 2024-08-28

## 2024-08-28 RX ORDER — FENTANYL CITRATE 50 UG/ML
25 INJECTION, SOLUTION INTRAMUSCULAR; INTRAVENOUS EVERY 5 MIN PRN
Status: DISCONTINUED | OUTPATIENT
Start: 2024-08-28 | End: 2024-08-28 | Stop reason: HOSPADM

## 2024-08-28 RX ORDER — BUPIVACAINE HYDROCHLORIDE 5 MG/ML
INJECTION, SOLUTION EPIDURAL; INTRACAUDAL
Status: COMPLETED | OUTPATIENT
Start: 2024-08-28 | End: 2024-08-28

## 2024-08-28 RX ORDER — AMOXICILLIN 250 MG
1 CAPSULE ORAL 2 TIMES DAILY
Status: DISCONTINUED | OUTPATIENT
Start: 2024-08-28 | End: 2024-08-30

## 2024-08-28 RX ORDER — IPRATROPIUM BROMIDE AND ALBUTEROL SULFATE 2.5; .5 MG/3ML; MG/3ML
3 SOLUTION RESPIRATORY (INHALATION)
Status: DISCONTINUED | OUTPATIENT
Start: 2024-08-28 | End: 2024-08-30

## 2024-08-28 RX ORDER — NICOTINE POLACRILEX 4 MG
15-30 LOZENGE BUCCAL
Status: DISCONTINUED | OUTPATIENT
Start: 2024-08-28 | End: 2024-09-04 | Stop reason: HOSPADM

## 2024-08-28 RX ORDER — HYDROMORPHONE HYDROCHLORIDE 2 MG/1
2 TABLET ORAL EVERY 4 HOURS PRN
Status: DISCONTINUED | OUTPATIENT
Start: 2024-08-28 | End: 2024-09-01

## 2024-08-28 RX ORDER — SODIUM CHLORIDE, SODIUM LACTATE, POTASSIUM CHLORIDE, CALCIUM CHLORIDE 600; 310; 30; 20 MG/100ML; MG/100ML; MG/100ML; MG/100ML
INJECTION, SOLUTION INTRAVENOUS CONTINUOUS
Status: DISCONTINUED | OUTPATIENT
Start: 2024-08-28 | End: 2024-08-30

## 2024-08-28 RX ORDER — IPRATROPIUM BROMIDE AND ALBUTEROL SULFATE 2.5; .5 MG/3ML; MG/3ML
SOLUTION RESPIRATORY (INHALATION)
Status: COMPLETED
Start: 2024-08-28 | End: 2024-08-28

## 2024-08-28 RX ORDER — EPHEDRINE SULFATE 50 MG/ML
25 INJECTION, SOLUTION INTRAVENOUS ONCE
Status: COMPLETED | OUTPATIENT
Start: 2024-08-28 | End: 2024-08-28

## 2024-08-28 RX ORDER — LIDOCAINE 40 MG/G
CREAM TOPICAL
Status: DISCONTINUED | OUTPATIENT
Start: 2024-08-28 | End: 2024-09-04 | Stop reason: HOSPADM

## 2024-08-28 RX ORDER — PROPOFOL 10 MG/ML
INJECTION, EMULSION INTRAVENOUS PRN
Status: DISCONTINUED | OUTPATIENT
Start: 2024-08-28 | End: 2024-08-28

## 2024-08-28 RX ORDER — ACETAMINOPHEN 325 MG/1
650 TABLET ORAL EVERY 4 HOURS PRN
Status: DISCONTINUED | OUTPATIENT
Start: 2024-08-31 | End: 2024-09-04 | Stop reason: HOSPADM

## 2024-08-28 RX ORDER — HYDROMORPHONE HCL IN WATER/PF 6 MG/30 ML
0.4 PATIENT CONTROLLED ANALGESIA SYRINGE INTRAVENOUS
Status: DISCONTINUED | OUTPATIENT
Start: 2024-08-28 | End: 2024-09-01

## 2024-08-28 RX ORDER — GLYCOPYRROLATE 0.2 MG/ML
INJECTION, SOLUTION INTRAMUSCULAR; INTRAVENOUS PRN
Status: DISCONTINUED | OUTPATIENT
Start: 2024-08-28 | End: 2024-08-28

## 2024-08-28 RX ORDER — MAGNESIUM HYDROXIDE 1200 MG/15ML
LIQUID ORAL PRN
Status: DISCONTINUED | OUTPATIENT
Start: 2024-08-28 | End: 2024-08-28 | Stop reason: HOSPADM

## 2024-08-28 RX ORDER — CEFAZOLIN SODIUM/WATER 2 G/20 ML
2 SYRINGE (ML) INTRAVENOUS
Status: DISCONTINUED | OUTPATIENT
Start: 2024-08-28 | End: 2024-09-01

## 2024-08-28 RX ORDER — NALOXONE HYDROCHLORIDE 1 MG/ML
0.1 INJECTION INTRAMUSCULAR; INTRAVENOUS; SUBCUTANEOUS
Status: DISCONTINUED | OUTPATIENT
Start: 2024-08-28 | End: 2024-08-28 | Stop reason: HOSPADM

## 2024-08-28 RX ORDER — PROPOFOL 10 MG/ML
INJECTION, EMULSION INTRAVENOUS CONTINUOUS PRN
Status: DISCONTINUED | OUTPATIENT
Start: 2024-08-28 | End: 2024-08-28

## 2024-08-28 RX ORDER — ONDANSETRON 2 MG/ML
INJECTION INTRAMUSCULAR; INTRAVENOUS
Status: COMPLETED
Start: 2024-08-28 | End: 2024-08-28

## 2024-08-28 RX ORDER — FENTANYL CITRATE 50 UG/ML
INJECTION, SOLUTION INTRAMUSCULAR; INTRAVENOUS PRN
Status: DISCONTINUED | OUTPATIENT
Start: 2024-08-28 | End: 2024-08-28

## 2024-08-28 RX ORDER — POLYETHYLENE GLYCOL 3350 17 G/17G
17 POWDER, FOR SOLUTION ORAL DAILY
Status: DISCONTINUED | OUTPATIENT
Start: 2024-08-29 | End: 2024-08-30

## 2024-08-28 RX ORDER — SODIUM CHLORIDE 9 MG/ML
INJECTION, SOLUTION INTRAVENOUS CONTINUOUS
Status: DISCONTINUED | OUTPATIENT
Start: 2024-08-28 | End: 2024-08-28

## 2024-08-28 RX ORDER — ONDANSETRON 4 MG/1
4 TABLET, ORALLY DISINTEGRATING ORAL EVERY 30 MIN PRN
Status: DISCONTINUED | OUTPATIENT
Start: 2024-08-28 | End: 2024-08-28 | Stop reason: HOSPADM

## 2024-08-28 RX ORDER — FUROSEMIDE 10 MG/ML
10 INJECTION INTRAMUSCULAR; INTRAVENOUS
Status: COMPLETED | OUTPATIENT
Start: 2024-08-28 | End: 2024-08-29

## 2024-08-28 RX ORDER — ONDANSETRON 2 MG/ML
4 INJECTION INTRAMUSCULAR; INTRAVENOUS EVERY 30 MIN PRN
Status: DISCONTINUED | OUTPATIENT
Start: 2024-08-28 | End: 2024-08-28 | Stop reason: HOSPADM

## 2024-08-28 RX ORDER — FENTANYL CITRATE 50 UG/ML
25-100 INJECTION, SOLUTION INTRAMUSCULAR; INTRAVENOUS
Status: DISCONTINUED | OUTPATIENT
Start: 2024-08-28 | End: 2024-09-01

## 2024-08-28 RX ORDER — FENTANYL CITRATE 50 UG/ML
50 INJECTION, SOLUTION INTRAMUSCULAR; INTRAVENOUS EVERY 5 MIN PRN
Status: DISCONTINUED | OUTPATIENT
Start: 2024-08-28 | End: 2024-08-28 | Stop reason: HOSPADM

## 2024-08-28 RX ORDER — NOREPINEPHRINE BITARTRATE 0.02 MG/ML
.01-.6 INJECTION, SOLUTION INTRAVENOUS CONTINUOUS
Status: DISCONTINUED | OUTPATIENT
Start: 2024-08-28 | End: 2024-09-01

## 2024-08-28 RX ORDER — DEXTROSE MONOHYDRATE 25 G/50ML
25-50 INJECTION, SOLUTION INTRAVENOUS
Status: DISCONTINUED | OUTPATIENT
Start: 2024-08-28 | End: 2024-09-04 | Stop reason: HOSPADM

## 2024-08-28 RX ORDER — SODIUM CHLORIDE, SODIUM LACTATE, POTASSIUM CHLORIDE, CALCIUM CHLORIDE 600; 310; 30; 20 MG/100ML; MG/100ML; MG/100ML; MG/100ML
INJECTION, SOLUTION INTRAVENOUS CONTINUOUS PRN
Status: DISCONTINUED | OUTPATIENT
Start: 2024-08-28 | End: 2024-08-28

## 2024-08-28 RX ORDER — ONDANSETRON 4 MG/1
4 TABLET, ORALLY DISINTEGRATING ORAL EVERY 6 HOURS PRN
Status: DISCONTINUED | OUTPATIENT
Start: 2024-08-28 | End: 2024-09-04 | Stop reason: HOSPADM

## 2024-08-28 RX ORDER — SODIUM CHLORIDE, SODIUM LACTATE, POTASSIUM CHLORIDE, CALCIUM CHLORIDE 600; 310; 30; 20 MG/100ML; MG/100ML; MG/100ML; MG/100ML
INJECTION, SOLUTION INTRAVENOUS CONTINUOUS
Status: DISCONTINUED | OUTPATIENT
Start: 2024-08-28 | End: 2024-08-28 | Stop reason: HOSPADM

## 2024-08-28 RX ORDER — ACETAMINOPHEN 325 MG/1
975 TABLET ORAL EVERY 8 HOURS
Status: COMPLETED | OUTPATIENT
Start: 2024-08-28 | End: 2024-08-31

## 2024-08-28 RX ORDER — CEFAZOLIN SODIUM/WATER 2 G/20 ML
2 SYRINGE (ML) INTRAVENOUS SEE ADMIN INSTRUCTIONS
Status: DISCONTINUED | OUTPATIENT
Start: 2024-08-28 | End: 2024-09-01

## 2024-08-28 RX ADMIN — MIDAZOLAM HYDROCHLORIDE 1 MG: 1 INJECTION, SOLUTION INTRAMUSCULAR; INTRAVENOUS at 15:30

## 2024-08-28 RX ADMIN — PIPERACILLIN AND TAZOBACTAM 3.38 G: 3; .375 INJECTION, POWDER, FOR SOLUTION INTRAVENOUS at 05:38

## 2024-08-28 RX ADMIN — EPHEDRINE SULFATE 10 MG: 50 INJECTION INTRAVENOUS at 16:49

## 2024-08-28 RX ADMIN — ACETAMINOPHEN 650 MG: 325 TABLET ORAL at 10:03

## 2024-08-28 RX ADMIN — BUPIVACAINE HYDROCHLORIDE 15 ML: 5 INJECTION, SOLUTION EPIDURAL; INTRACAUDAL at 15:10

## 2024-08-28 RX ADMIN — DAPSONE 50 MG: 25 TABLET ORAL at 09:12

## 2024-08-28 RX ADMIN — PIPERACILLIN AND TAZOBACTAM 3.38 G: 3; .375 INJECTION, POWDER, FOR SOLUTION INTRAVENOUS at 22:35

## 2024-08-28 RX ADMIN — Medication 1 UNITS: at 17:16

## 2024-08-28 RX ADMIN — PHENYLEPHRINE HYDROCHLORIDE 200 MCG: 10 INJECTION INTRAVENOUS at 16:45

## 2024-08-28 RX ADMIN — PIPERACILLIN AND TAZOBACTAM 3.38 G: 3; .375 INJECTION, POWDER, FOR SOLUTION INTRAVENOUS at 13:33

## 2024-08-28 RX ADMIN — CALCIUM CHLORIDE 250 MG: 100 INJECTION INTRAVENOUS; INTRAVENTRICULAR at 17:43

## 2024-08-28 RX ADMIN — TACROLIMUS 3 MG: 1 CAPSULE ORAL at 22:24

## 2024-08-28 RX ADMIN — TACROLIMUS 3 MG: 1 CAPSULE ORAL at 09:12

## 2024-08-28 RX ADMIN — Medication 1 UNITS: at 17:10

## 2024-08-28 RX ADMIN — ONDANSETRON 4 MG: 2 INJECTION INTRAMUSCULAR; INTRAVENOUS at 19:48

## 2024-08-28 RX ADMIN — NOREPINEPHRINE BITARTRATE 0.1 MCG/KG/MIN: 0.02 INJECTION, SOLUTION INTRAVENOUS at 19:49

## 2024-08-28 RX ADMIN — ACETAMINOPHEN 650 MG: 325 TABLET ORAL at 02:09

## 2024-08-28 RX ADMIN — MEPIVACAINE HYDROCHLORIDE 10 ML: 20 INJECTION, SOLUTION EPIDURAL; INFILTRATION at 15:15

## 2024-08-28 RX ADMIN — PROPOFOL 40 MG: 10 INJECTION, EMULSION INTRAVENOUS at 15:52

## 2024-08-28 RX ADMIN — ASPIRIN 81 MG: 81 TABLET, COATED ORAL at 09:13

## 2024-08-28 RX ADMIN — FENTANYL CITRATE 50 MCG: 50 INJECTION INTRAMUSCULAR; INTRAVENOUS at 16:17

## 2024-08-28 RX ADMIN — THERA TABS 1 TABLET: TAB at 09:13

## 2024-08-28 RX ADMIN — SULFAMETHOXAZOLE AND TRIMETHOPRIM 1 TABLET: 800; 160 TABLET ORAL at 22:31

## 2024-08-28 RX ADMIN — Medication 1 UNITS: at 16:54

## 2024-08-28 RX ADMIN — PANTOPRAZOLE SODIUM 40 MG: 20 TABLET, DELAYED RELEASE ORAL at 09:12

## 2024-08-28 RX ADMIN — SODIUM CHLORIDE, POTASSIUM CHLORIDE, SODIUM LACTATE AND CALCIUM CHLORIDE: 600; 310; 30; 20 INJECTION, SOLUTION INTRAVENOUS at 14:55

## 2024-08-28 RX ADMIN — CALCIUM CHLORIDE 250 MG: 100 INJECTION INTRAVENOUS; INTRAVENTRICULAR at 17:44

## 2024-08-28 RX ADMIN — Medication 1 TABLET: at 09:13

## 2024-08-28 RX ADMIN — IPRATROPIUM BROMIDE AND ALBUTEROL SULFATE 3 ML: .5; 3 SOLUTION RESPIRATORY (INHALATION) at 20:07

## 2024-08-28 RX ADMIN — PROPOFOL 100 MCG/KG/MIN: 10 INJECTION, EMULSION INTRAVENOUS at 15:53

## 2024-08-28 RX ADMIN — FENTANYL CITRATE 50 MCG: 50 INJECTION INTRAMUSCULAR; INTRAVENOUS at 16:39

## 2024-08-28 RX ADMIN — PIPERACILLIN AND TAZOBACTAM 3.38 G: 3; .375 INJECTION, POWDER, FOR SOLUTION INTRAVENOUS at 14:45

## 2024-08-28 RX ADMIN — ACYCLOVIR 400 MG: 400 TABLET ORAL at 22:24

## 2024-08-28 RX ADMIN — PREDNISONE 5 MG: 5 TABLET ORAL at 09:12

## 2024-08-28 RX ADMIN — CALCIUM CHLORIDE 250 MG: 100 INJECTION INTRAVENOUS; INTRAVENTRICULAR at 17:47

## 2024-08-28 RX ADMIN — EPHEDRINE SULFATE 5 MG: 50 INJECTION INTRAVENOUS at 16:40

## 2024-08-28 RX ADMIN — EPHEDRINE SULFATE 5 MG: 50 INJECTION INTRAVENOUS at 16:28

## 2024-08-28 RX ADMIN — SODIUM CHLORIDE, POTASSIUM CHLORIDE, SODIUM LACTATE AND CALCIUM CHLORIDE: 600; 310; 30; 20 INJECTION, SOLUTION INTRAVENOUS at 15:50

## 2024-08-28 RX ADMIN — CARVEDILOL 6.25 MG: 6.25 TABLET, FILM COATED ORAL at 09:13

## 2024-08-28 RX ADMIN — HYDROMORPHONE HYDROCHLORIDE 2 MG: 2 TABLET ORAL at 05:36

## 2024-08-28 RX ADMIN — EPHEDRINE SULFATE 10 MG: 50 INJECTION INTRAVENOUS at 16:54

## 2024-08-28 RX ADMIN — PREDNISONE 2.5 MG: 2.5 TABLET ORAL at 22:25

## 2024-08-28 RX ADMIN — PHENYLEPHRINE HYDROCHLORIDE 100 MCG: 10 INJECTION INTRAVENOUS at 16:08

## 2024-08-28 RX ADMIN — FUROSEMIDE 20 MG: 20 TABLET ORAL at 09:13

## 2024-08-28 RX ADMIN — CALCIUM CHLORIDE 250 MG: 100 INJECTION INTRAVENOUS; INTRAVENTRICULAR at 17:45

## 2024-08-28 RX ADMIN — GLYCOPYRROLATE 0.2 MG: 0.2 INJECTION INTRAMUSCULAR; INTRAVENOUS at 17:51

## 2024-08-28 RX ADMIN — HYDROCORTISONE SODIUM SUCCINATE 100 MG: 100 INJECTION, POWDER, FOR SOLUTION INTRAMUSCULAR; INTRAVENOUS at 20:12

## 2024-08-28 RX ADMIN — MAGNESIUM OXIDE TAB 400 MG (241.3 MG ELEMENTAL MG) 400 MG: 400 (241.3 MG) TAB at 22:34

## 2024-08-28 RX ADMIN — ACYCLOVIR 400 MG: 400 TABLET ORAL at 09:12

## 2024-08-28 RX ADMIN — FLUDROCORTISONE ACETATE 0.1 MG: 0.1 TABLET ORAL at 09:13

## 2024-08-28 RX ADMIN — HYDROMORPHONE HYDROCHLORIDE 2 MG: 2 TABLET ORAL at 11:35

## 2024-08-28 RX ADMIN — PHENYLEPHRINE HYDROCHLORIDE 0.3 MCG/KG/MIN: 10 INJECTION INTRAVENOUS at 16:10

## 2024-08-28 RX ADMIN — VANCOMYCIN HYDROCHLORIDE 1000 MG: 1 INJECTION, SOLUTION INTRAVENOUS at 09:26

## 2024-08-28 RX ADMIN — EPHEDRINE SULFATE 25 MG: 50 INJECTION INTRAVENOUS at 18:40

## 2024-08-28 RX ADMIN — EPHEDRINE SULFATE 5 MG: 50 INJECTION INTRAVENOUS at 16:19

## 2024-08-28 RX ADMIN — MAGNESIUM OXIDE TAB 400 MG (241.3 MG ELEMENTAL MG) 400 MG: 400 (241.3 MG) TAB at 09:13

## 2024-08-28 RX ADMIN — FLUTICASONE FUROATE AND VILANTEROL TRIFENATATE 1 PUFF: 100; 25 POWDER RESPIRATORY (INHALATION) at 09:23

## 2024-08-28 RX ADMIN — BUPIVACAINE HYDROCHLORIDE 20 ML: 5 INJECTION, SOLUTION EPIDURAL; INTRACAUDAL; PERINEURAL at 15:15

## 2024-08-28 RX ADMIN — EPHEDRINE SULFATE 10 MG: 50 INJECTION INTRAVENOUS at 16:32

## 2024-08-28 RX ADMIN — PHENYLEPHRINE HYDROCHLORIDE 100 MCG: 10 INJECTION INTRAVENOUS at 16:22

## 2024-08-28 RX ADMIN — ROSUVASTATIN 20 MG: 10 TABLET, FILM COATED ORAL at 09:12

## 2024-08-28 RX ADMIN — SULFAMETHOXAZOLE AND TRIMETHOPRIM 1 TABLET: 800; 160 TABLET ORAL at 09:13

## 2024-08-28 RX ADMIN — SODIUM CHLORIDE: 9 INJECTION, SOLUTION INTRAVENOUS at 09:27

## 2024-08-28 RX ADMIN — FENTANYL CITRATE 100 MCG: 50 INJECTION, SOLUTION INTRAMUSCULAR; INTRAVENOUS at 15:12

## 2024-08-28 RX ADMIN — EPHEDRINE SULFATE 5 MG: 50 INJECTION INTRAVENOUS at 16:46

## 2024-08-28 ASSESSMENT — ACTIVITIES OF DAILY LIVING (ADL)
ADLS_ACUITY_SCORE: 42
ADLS_ACUITY_SCORE: 43
ADLS_ACUITY_SCORE: 42
ADLS_ACUITY_SCORE: 42
ADLS_ACUITY_SCORE: 43
ADLS_ACUITY_SCORE: 43
ADLS_ACUITY_SCORE: 42
ADLS_ACUITY_SCORE: 43
ADLS_ACUITY_SCORE: 42
ADLS_ACUITY_SCORE: 42
ADLS_ACUITY_SCORE: 43
ADLS_ACUITY_SCORE: 42
ADLS_ACUITY_SCORE: 43
ADLS_ACUITY_SCORE: 42
ADLS_ACUITY_SCORE: 42
ADLS_ACUITY_SCORE: 43
ADLS_ACUITY_SCORE: 43
ADLS_ACUITY_SCORE: 42
ADLS_ACUITY_SCORE: 43

## 2024-08-28 ASSESSMENT — COPD QUESTIONNAIRES: COPD: 1

## 2024-08-28 NOTE — PLAN OF CARE
Problem: Pain Acute  Goal: Optimal Pain Control and Function  Intervention: Prevent or Manage Pain  Recent Flowsheet Documentation  Taken 8/28/2024 0110 by Amisha Mena RN  Medication Review/Management: medications reviewed  Intervention: Optimize Psychosocial Wellbeing  Recent Flowsheet Documentation  Taken 8/28/2024 0110 by Amisha Mena RN  Supportive Measures:   active listening utilized   positive reinforcement provided   verbalization of feelings encouraged     Problem: Pain Acute  Goal: Optimal Pain Control and Function  Intervention: Optimize Psychosocial Wellbeing  Recent Flowsheet Documentation  Taken 8/28/2024 0110 by Amisha Mena RN  Supportive Measures:   active listening utilized   positive reinforcement provided   verbalization of feelings encouraged   Goal Outcome Evaluation:  Patient is alert and oriented x 4 and makes his needs known. Medicated with current pain regime with good effect. Patient has been NPO and understands rationale in prep for surgery later this today. Plan of Care reviewed.

## 2024-08-28 NOTE — PROGRESS NOTES
PT placed on bilevel in PACU for hypoxia 86% via 02 mask. Curernt settings are: S/T 12/8, 18, 85%. PT tolerating well. ABG was drawn. PT waiting for bed in ICU. RT will continue to follow.    Mahin Rubio, RT on 8/28/2024 at 6:54 PM

## 2024-08-28 NOTE — ANESTHESIA PROCEDURE NOTES
Sciatic Procedure Note    Pre-Procedure   Staff -        Anesthesiologist:  Darwin Pennington MD       Performed By: anesthesiologist       Location: pre-op       Procedure Start/Stop Times: 8/28/2024 3:15 PM and 8/28/2024 3:18 PM       Pre-Anesthestic Checklist: patient identified, IV checked, site marked, risks and benefits discussed, informed consent, monitors and equipment checked, pre-op evaluation, at physician/surgeon's request and post-op pain management  Timeout:       Correct Patient: Yes        Correct Procedure: Yes        Correct Site: Yes        Correct Position: Yes        Correct Laterality: Yes        Site Marked: Yes  Procedure Documentation  Procedure: Sciatic       Laterality: right       Patient Position: supine       Patient Prep/Sterile Barriers: sterile gloves, mask       Skin prep: Chloraprep (popliteal approach).       Needle Type: short bevel and insulated       Needle Gauge: 20.        Needle Length (Inches): 4        Ultrasound guided       1. Ultrasound was used to identify targeted nerve, plexus, vascular marker, or fascial plane and place a needle adjacent to it in real-time.       2. Ultrasound was used to visualize the spread of anesthetic in close proximity to the above referenced structure.       3. A permanent image is entered into the patient's record.       4. The visualized anatomic structures appeared normal.       5. There were no apparent abnormal pathologic findings.    Assessment/Narrative         The placement was negative for: blood aspirated, painful injection and site bleeding       Paresthesias: No.       Bolus given via needle..        Secured via.        Insertion/Infusion Method: Single Shot       Complications: none    Medication(s) Administered   Bupivacaine 0.5% PF (Infiltration) - Infiltration   20 mL - 8/28/2024 3:15:00 PM  Mepivacaine 2% PF (Perineural) - Perineural   10 mL - 8/28/2024 3:15:00 PM  Medication Administration Time: 8/28/2024 3:15  "PM      FOR [facility] ONLY:   Pain Team Contact information: please page the Pain Team Via [employer]. Search \"Pain\". During daytime hours, please page the attending first. At night please page the resident first.      "
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
## 2024-08-28 NOTE — ANESTHESIA PROCEDURE NOTES
Adductor canal Procedure Note    Pre-Procedure   Staff -        Anesthesiologist:  Darwin Pennington MD       Performed By: anesthesiologist       Location: pre-op       Procedure Start/Stop Times: 8/28/2024 3:10 PM and 8/28/2024 3:20 PM       Pre-Anesthestic Checklist: patient identified, IV checked, site marked, risks and benefits discussed, informed consent, monitors and equipment checked, pre-op evaluation, at physician/surgeon's request and post-op pain management  Timeout:       Correct Patient: Yes        Correct Procedure: Yes        Correct Site: Yes        Correct Position: Yes        Correct Laterality: Yes        Site Marked: Yes  Procedure Documentation  Procedure: Adductor canal       Laterality: right       Patient Position: supine       Patient Prep/Sterile Barriers: sterile gloves, mask       Skin prep: Chloraprep       Needle Type: short bevel and insulated       Needle Gauge: 20.        Needle Length (Inches): 4        Ultrasound guided       1. Ultrasound was used to identify targeted nerve, plexus, vascular marker, or fascial plane and place a needle adjacent to it in real-time.       2. Ultrasound was used to visualize the spread of anesthetic in close proximity to the above referenced structure.       3. A permanent image is entered into the patient's record.       4. The visualized anatomic structures appeared normal.       5. There were no apparent abnormal pathologic findings.    Assessment/Narrative         The placement was negative for: blood aspirated, painful injection and site bleeding       Paresthesias: No.       Bolus given via needle..        Secured via.        Insertion/Infusion Method: Single Shot       Complications: none    Medication(s) Administered   Bupivacaine 0.5% PF (Infiltration) - Infiltration   15 mL - 8/28/2024 3:10:00 PM  Mepivacaine 2% PF (Perineural) - Perineural   5 mL - 8/28/2024 3:10:00 PM  Medication Administration Time: 8/28/2024 3:10 PM      FOR [facility]  "([facility]) ONLY:   Pain Team Contact information: please page the Pain Team Via [employer]. Search \"Pain\". During daytime hours, please page the attending first. At night please page the resident first.      "

## 2024-08-28 NOTE — PLAN OF CARE
Patient had a calm restful evening, pt. conversational and upbeat. He was informed by this writer that his surgery has been moved up to tomorrow and he is to have no food or drink after midnight . Patient pain maintained at his tolerable level with as needed oral medication. He did not need insulin coverage  this shift. CMS + in Bilateral LE's.  Problem: Adult Inpatient Plan of Care  Goal: Plan of Care Review  Description: The Plan of Care Review/Shift note should be completed every shift.  The Outcome Evaluation is a brief statement about your assessment that the patient is improving, declining, or no change.  This information will be displayed automatically on your shift  note.  Outcome: Progressing     Problem: Adult Inpatient Plan of Care  Goal: Optimal Comfort and Wellbeing  Outcome: Progressing     Problem: Risk for Delirium  Goal: Optimal Coping  Outcome: Progressing     Problem: Pain Acute  Goal: Optimal Pain Control and Function  Outcome: Progressing     Problem: Glycemic Control Impaired  Goal: Blood Glucose Level Within Targeted Range  Outcome: Progressing   Goal Outcome Evaluation:

## 2024-08-28 NOTE — BRIEF OP NOTE
[facility]    Brief Operative Note    Pre-operative diagnosis: PAD (peripheral artery disease) (H24) [I73.9]  Post-operative diagnosis Same as pre-operative diagnosis    Procedure: AMPUTATION, RIGHT BELOW KNEE, Right - Leg    Surgeon: Surgeons and Role:     * Smiley Jay MD - Primary     * Mario Steele MD - Resident - Assisting  Anesthesia: MAC with Block   Estimated Blood Loss: 100 ml    Drains: None  Specimens:   ID Type Source Tests Collected by Time Destination   1 : Lower Right Leg Amputation, Non-Traumatic Leg, Below Knee, Right SURGICAL PATHOLOGY EXAM Smiley Jay MD 8/28/2024  4:35 PM      Findings: Pink healthy muscle, substantial oozing from venous collaterals  Complications: None.  Implants: * No implants in log *

## 2024-08-28 NOTE — PLAN OF CARE
Problem: Adult Inpatient Plan of Care  Goal: Plan of Care Review  Description: The Plan of Care Review/Shift note should be completed every shift.  The Outcome Evaluation is a brief statement about your assessment that the patient is improving, declining, or no change.  This information will be displayed automatically on your shift  note.  Outcome: Progressing     Problem: Adult Inpatient Plan of Care  Goal: Absence of Hospital-Acquired Illness or Injury  Outcome: Progressing     Problem: Adult Inpatient Plan of Care  Goal: Absence of Hospital-Acquired Illness or Injury  Intervention: Prevent Skin Injury  Recent Flowsheet Documentation  Taken 8/28/2024 0800 by Jonny Barrios, RN  Body Position: position changed independently     Problem: Adult Inpatient Plan of Care  Goal: Absence of Hospital-Acquired Illness or Injury  Intervention: Prevent Infection  Recent Flowsheet Documentation  Taken 8/28/2024 0800 by Jonny Barrios, RN  Infection Prevention: hand hygiene promoted   Goal Outcome Evaluation:       Patient alert oriented x 4, able to express needs, received PRN Dilaudid this shift, IV ABX's, NS 75cc/hr continuous, surgery scheduled for 1450, urinal at bedside.Lc bath completed for surgery.

## 2024-08-28 NOTE — INTERVAL H&P NOTE
I have reviewed the surgical (or preoperative) H&P that is linked to this encounter, and examined the patient. There are no significant changes.  After careful review of the patient's history and imaging and after discussion with Dr. Sneed I have presented to the patient that I think it is reasonable to move forward with a below-knee amputation instead of an above-knee amputation.  This is based on inline flow to the ankle, healthy calf muscle, absence of contracture, and the patient's recent ability to walk.  Patient was also not aware that with above-knee amputation he would not  have a significant chance of relearning how to walk.  Will plan on this operation under regional block and will consult the orthotics team in hospital.    Clinical Conditions Present on Arrival:  Clinically Significant Risk Factors Present on Admission

## 2024-08-28 NOTE — PROGRESS NOTES
[facility]    Infectious Disease Progress Note     08/28/2024     Chart reviewed  Patient in OR on 8/28/2024    Assessment & Plan   Aubrey Duncan is a 69 year old male who was admitted on 8/25/2024.     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  Scheduled for above-knee amputation on 8/29/2024  Comorbid conditions: Immunosuppression, peripheral vascular disease, chronic kidney disease, type 2 diabetes      RECOMMENDATIONS:    Antibiotics-trimethoprim/sulfamethoxazole, dosing discussed with pharmacy, continue Zosyn  Stopped vancomycin on 8/28/2024   Follow culture results   Focus/de-escalate antibiotics based on final culture results  Monitor CBC, CMP  Prophylaxis-dapsone, azithromycin-prior to admission  Plan for BKA on 8/29/2024, see vascular surgery notes  ID will follow      Patricia Palacios MD  [facility]  [phone]      Interval History   Chart reviewed  Patient in OR    Previous note:    Pain control  Wife at bedside and updated  Antibiotic management discussed with pharmacy      Physical Exam   Temp: 98.3  F (36.8  C) Temp src: Oral BP: (!) 175/90 Pulse: 77   Resp: 18 SpO2: 95 % O2 Device: None (Room air)    Vitals:    08/25/24 1554 08/27/24 0749   Weight: 64.9 kg (143 lb) 67.7 kg (149 lb 4 oz)     Vital Signs with Ranges  Temp:  [97.8  F (36.6  C)-98.5  F (36.9  C)] 98.3  F (36.8  C)  Pulse:  [75-92] 77  Resp:  [17-20] 18  BP: (123-175)/(62-90) 175/90  SpO2:  [94 %-95 %] 95 %    Constitutional: Awake, no apparent distress  Lungs: normal breathing pattern, no crackles or wheezing  Cardiovascular: Regular rate and rhythm, S1 S2  Abdomen: non distended  Skin: Dressing on lower extremity  Neuro: deconditioned  Psych: able to answer questions    Medications   Current  Facility-Administered Medications   Medication Dose Route Frequency Provider Last Rate Last Admin    sodium chloride 0.9 % infusion   Intravenous Continuous SalesKenneth,  75 mL/hr at 08/28/24 0927 New Bag at 08/28/24 0927     Current Facility-Administered Medications   Medication Dose Route Frequency Provider Last Rate Last Admin    [Auto Hold] acyclovir (ZOVIRAX) tablet 400 mg  400 mg Oral BID LaboltMiranda NP   400 mg at 08/28/24 0912    [Auto Hold] amLODIPine (NORVASC) tablet 10 mg  10 mg Oral Daily LaboltMiranda, NP   10 mg at 08/27/24 0917    [Auto Hold] aspirin EC tablet 81 mg  81 mg Oral Daily LaboltMiranda NP   81 mg at 08/28/24 0913    [Auto Hold] calcium carbonate-vitamin D (OSCAL) 500-5 MG-MCG per tablet 1 tablet  1 tablet Oral BID w/meals LaboltMiranda NP   1 tablet at 08/28/24 0913    [Auto Hold] carvedilol (COREG) tablet 6.25 mg  6.25 mg Oral BID w/meals LaboltMiranda NP   6.25 mg at 08/28/24 0913    ceFAZolin Sodium (ANCEF) injection 2 g  2 g Intravenous Pre-Op/Pre-procedure x 1 dose Grace Sneed MD        ceFAZolin Sodium (ANCEF) injection 2 g  2 g Intravenous See Admin Instructions Grace Sneed MD        [Auto Hold] clopidogrel (PLAVIX) tablet 75 mg  75 mg Oral Daily LabolMiranda murray NP   75 mg at 08/27/24 0917    [Auto Hold] dapsone (ACZONE) tablet 50 mg  50 mg Oral Daily LabolMiranda murray NP   50 mg at 08/28/24 0912    [Auto Hold] fludrocortisone (FLORINEF) tablet 0.1 mg  0.1 mg Oral Daily LabolMiranda murray NP   0.1 mg at 08/28/24 0913    [Auto Hold] fluticasone-vilanterol (BREO ELLIPTA) 100-25 MCG/ACT inhaler 1 puff  1 puff Inhalation Daily LabolMiranda murray NP   1 puff at 08/28/24 0923    [Auto Hold] furosemide (LASIX) tablet 20 mg  20 mg Oral Daily Kenneth Sales DO   20 mg at 08/28/24 0913    [Auto Hold] insulin aspart (NovoLOG) injection (RAPID ACTING)  1-7 Units Subcutaneous TID Miranda Barnes NP   1 Units at  08/27/24 1152    [Auto Hold] insulin aspart (NovoLOG) injection (RAPID ACTING)  1-5 Units Subcutaneous At Bedtime Miranda Sol NP        [Auto Hold] insulin glargine (LANTUS PEN) injection 18 Units  18 Units Subcutaneous QAM AC SaelsKenneth calixto, DO   18 Units at 08/27/24 0923    [Held by provider] lisinopril (ZESTRIL) tablet 40 mg  40 mg Oral Daily Kenneth Sales DO   40 mg at 08/27/24 0918    [Auto Hold] magnesium oxide (MAG-OX) tablet 400 mg  400 mg Oral BID Miranda Sol NP   400 mg at 08/28/24 0913    [Auto Hold] multivitamin, therapeutic (THERA-VIT) tablet 1 tablet  1 tablet Oral Daily Miranda Sol NP   1 tablet at 08/28/24 0913    [Auto Hold] pantoprazole (PROTONIX) EC tablet 40 mg  40 mg Oral Daily Miranda Sol NP   40 mg at 08/28/24 0912    [Auto Hold] piperacillin-tazobactam (ZOSYN) 3.375 g vial to attach to  mL bag  3.375 g Intravenous Q8H Miranda Sol NP   3.375 g at 08/28/24 1333    [Auto Hold] predniSONE (DELTASONE) tablet 2.5 mg  2.5 mg Oral QPM LabMiranda morales NP   2.5 mg at 08/27/24 2006    [Auto Hold] predniSONE (DELTASONE) tablet 5 mg  5 mg Oral QAM LabMiranda morales NP   5 mg at 08/28/24 0912    [Auto Hold] rosuvastatin (CRESTOR) tablet 20 mg  20 mg Oral Q Mon Wed Fri AM LabMiranda morales NP   20 mg at 08/28/24 0912    [Auto Hold] sodium chloride (PF) 0.9% PF flush 3 mL  3 mL Intracatheter Q8H Miranda Sol NP   3 mL at 08/27/24 2007    [Auto Hold] sulfamethoxazole-trimethoprim (BACTRIM DS) 800-160 MG per tablet 1 tablet  1 tablet Oral BID Patricia Palacios MD   1 tablet at 08/28/24 0913    [Auto Hold] tacrolimus (GENERIC EQUIVALENT) capsule 3 mg  3 mg Oral QAM Miranda Sol NP   3 mg at 08/28/24 0912    And    [Auto Hold] tacrolimus (GENERIC EQUIVALENT) capsule 3 mg  3 mg Oral QPM Mirnada Sol NP   3 mg at 08/27/24 2004    [Auto Hold] vancomycin (VANCOCIN) 1,000 mg in 200 mL dextrose intermittent infusion  1,000 mg Intravenous Q12H  Kenneth Sales  mL/hr at 08/28/24 0926 1,000 mg at 08/28/24 0926    [Auto Hold] wound support modular (EXPEDITE) bottle 60 mL  60 mL Oral Daily Kenneth Sales, DO   60 mL at 08/27/24 0922       Data   All microbiology laboratory data reviewed.  Recent Labs   Lab Test 08/27/24  0522 08/26/24  0528 08/25/24  1632   WBC 8.2 8.2 8.3   HGB 9.6* 9.6* 10.5*   HCT 30.3* 31.4* 34.0*   * 103* 104*    279 359     Recent Labs   Lab Test 08/28/24  0521 08/27/24  0522 08/26/24  0528   CR 0.90 0.82 1.11     Recent Labs   Lab Test 08/25/24  1632   SED 52*     Recent Labs   Lab Test 07/06/21  1101 11/27/20  1430 10/16/20  0939 09/11/20  0818 07/16/20  0856 07/02/20  1042 06/30/20  1056 02/27/19  0737 02/25/19  1814   CULT Heavy growth  Klebsiella pneumoniae  *  Heavy growth  Pseudomonas aeruginosa  * Moderate growth  Pseudomonas aeruginosa  * Heavy growth  Pseudomonas aeruginosa  Some antibiotics have been suppressed due to the known inducible beta lactamase activity.   Please contact Microbiology for more information.  * Heavy growth  Escherichia coli  *  Heavy growth  Enterococcus faecalis  * Moderate growth  Stenotrophomonas maltophilia  * No growth after 6 days  No growth after 6 days Heavy growth  Klebsiella pneumoniae  * Canceled, Test credited  >10 Squamous epithelial cells/low power field indicates oral contamination. Please   recollect.  *  Notification of test cancellation was given to   SOHA VALLE RN @0951 2/27/19. CT   No growth       MICROBIOLOGY:    Reviewed    7-Day Micro Results       Collected Updated Procedure Result Status      08/25/2024 1702 08/27/2024 2302 Wound Aerobic Bacterial Culture Routine With Gram Stain [[id number]]    (Abnormal)   Wound from Leg, Below Knee, Right    Preliminary result Component Value   Culture 4+ Stenotrophomonas maltophilia  [P]     4+ Enterococcus faecalis  [P]     4+ Normal jcarlos  [P]    Gram Stain Result 4+ Gram negative bacilli  [P]     4+ Gram  "positive cocci  [P]     2+ WBC seen  [P]         Susceptibility        Enterococcus faecalis      DELTA      Ampicillin <=2 ug/mL Susceptible      Ciprofloxacin <=0.5 ug/mL Susceptible  [*]       Doxycycline <=0.5 ug/mL Susceptible  [*]       Gentamicin Synergy Susceptible ug/mL Susceptible  [1]       Levofloxacin 1 ug/mL Susceptible  [*]       Linezolid 2 ug/mL Susceptible  [*]       Nitrofurantoin <=16 ug/mL Susceptible  [*]       Streptomycin Synergy Susceptible ug/mL Susceptible  [*]       Tigecycline <=0.12 ug/mL Susceptible  [*]       Vancomycin 1 ug/mL Susceptible                   [*]  Suppressed Antibiotic     [1]  No high level gentamicin resistance found - therefore combination therapy with an aminoglycoside may be indicated for serious enterococcal infections such as bacteremia and endocarditis.                    08/25/2024 1643 08/27/2024 1816 Blood Culture Arm, Right [[id number]]   Blood from Arm, Right    Preliminary result Component Value   Culture No growth after 2 days  [P]                         RADIOLOGY:    Reviewed  POC US Guidance Needle Placement    Result Date: 8/1/2024  Ultrasound was performed as guidance to an anesthesia procedure.  Click \"PACS images\" hyperlink below to view any stored images.  For specific procedure details, view procedure note authored by anesthesia.               "

## 2024-08-28 NOTE — PROGRESS NOTES
[facility]    Medicine Progress Note - Hospitalist Service    Date of Admission:  8/25/2024    Assessment & Plan   Aubrey Duncan is a 69 year old male with PMH lung transplant (2013), PAD, chronic limb threatening ischemia, CAD, HTN, HLD, COPD, GERD, DM type II, and CKD is admitted on 8/25/2024 for right lower extremity wound infection.     Right lower extremity wound infection  -Wound dehiscence positive for purulent drainage  -Wound culture: stenotrophomonas, Enterococcus faecalis preliminary results   -Infectious disease consulted, abx per ID  -continue Bactrim and Zosyn (vancomycin discontinued 8/28/24)  -planning for AKA today per surgery   -Blood cultures pending  -Vascular consulted  -WOC consult placed   -PTA Tylenol, Dilaudid for pain control   -PT/OT when appropriate post-op      SHELLEY on CKD- resolved   Lactic acidosis- resolved   -Trend BMP    PAD  -PTA aspirin, clopidogrel  -vascular following    Macrocytic anemia  -no s/s of bleed  -stable hgb  -likely combination of dilutional and immunosuppressive   -continue to monitor      DM type II  -Hemoglobin A1c 4.2   -continue PTA Lantus  -Sliding scale insulin  -hypoglycemia protocol      CAD  -PTA aspirin, carvedilol     HTN  -PTA amlodipine, lasix   -hold lisinopril for surgery, likely restart tomorrow pending creatinine  -pain management    HLD  -PTA rosuvastatin     Hx lung transplant  -continue acyclovir, dapsone ppx  -holding PTA azithromycin 3x weekly while on empiric abx as noted above   -continue PTA prednisone and tacrolimus unless s/s of progressing infection  -will continue PTA fludrocortisone (taking due to CNI related hyperkalemia)   -continue home inhalers         Diet: Snacks/Supplements Adult: Glucerna; Between Meals  Snacks/Supplements Adult: Gelatein Plus; Between Meals  NPO per Anesthesia Guidelines for Procedure/Surgery Except for: Meds    DVT Prophylaxis: SCDs  Naranjo Catheter: Not present  Lines: None     Cardiac  Monitoring: None  Code Status: Full Code      Clinically Significant Risk Factors              # Hypoalbuminemia: Lowest albumin = 2.8 g/dL at 8/27/2024  5:22 AM, will monitor as appropriate     # Hypertension: Noted on problem list              # Severe Malnutrition: based on nutrition assessment, PRESENT ON ADMISSION   # Financial/Environmental Concerns: none               Disposition Plan     Medically Ready for Discharge: Anticipated in 2-4 Days             Kenneth Sales DO  Hospitalist Service  [facility]  Securely message with Door 6 (more info)  Text page via [employer] Paging/Directory   ______________________________________________________________________    Interval History   No acute overnight events.  Patient's family is in the room during encounter.  Patient is in good spirits and denies any acute complaints.  All questions answered.    Physical Exam   Vital Signs: Temp: 98.3  F (36.8  C) Temp src: Oral BP: (!) 175/90 Pulse: 77   Resp: 18 SpO2: 95 % O2 Device: None (Room air)    Weight: 149 lbs 4.02 oz    General Appearance: Alert, nontoxic  Respiratory: CTAB, breathing comfortably on room air  Cardiovascular: RRR, no murmur   GI: no pain  Skin: left LE with black eschar on big toe, wound dehiscence of LLE wound with purulent drainage however covered in clean bandage without any serosanguineous drainage, no probe to bone and no significant surrounding erythema   Other: Alert and oriented      Medical Decision Making       50 MINUTES SPENT BY ME on the date of service doing chart review, history, exam, documentation & further activities per the note.      Data     I have personally reviewed the following data over the past 24 hrs:    N/A  \   N/A   / N/A     138 104 22.2 /  100 (H)   4.5 25 0.90 \       Imaging results reviewed over the past 24 hrs:   Recent Results (from the past 24 hour(s))   POC US Guidance Needle Placement    Narrative    Ultrasound was performed as guidance to  "an anesthesia procedure.  Click   \"PACS images\" hyperlink below to view any stored images.  For specific   procedure details, view procedure note authored by anesthesia.     "

## 2024-08-28 NOTE — PROGRESS NOTES
"CLINICAL NUTRITION SERVICES - Brief Note     Nutrition Prescription    RECOMMENDATIONS FOR MDs/PROVIDERS TO ORDER:  None    Malnutrition Status:    Severe in acute on chronic illness    Recommendations already ordered by Registered Dietitian (RD):  -Will add to allow double protein portions on meals.   -Add gel plus daily = 150 kcal, 20 g protein    Future/Additional Recommendations:  Adjust supplements pending intake, weight, tolerance, acceptance, labs, BG, wound healing       CURRENT NUTRITION ORDERS  Diet: NPO for AKA today    Previous diet: Regular   Supplements: Glucerna daily, expedite daily    Intake/Tolerance: Fair   Eating 100% of 3 meal/day and taking both supplements but ordering inadequately  Estimate intake with supplements [id number] carmen, 61-76 g protein/day meeting 65-85% of estimated kcal and 60-75% of estimated protein needs    Pt reports meals are going well and his appetite is good but would like larger portions. He likes the expedite and the Glucerna is \"ok\"-a bit too thick but he is willing to continue taking. Reviewed current inadequate intake compared to increased estimated nutrition needs. Pt plans to order double protein portions and wiling to try gel plus   Family at bedside and supportive  Pt managing CHO on his own without hospital diet restriction     LABS  Labs reviewed:  BG  mg/dl past 24 hours, in excellent control    ANTHROPOMETRICS  Weight History: Wt up 4 lb x 2 days  Date/Time Weight Weight Method   08/27/24 0749 67.7 kg (149 lb 4 oz)- Pre BKA Bed scale   08/25/24 1554 64.9 kg (143 lb) --     Wt Readings from Last 10 Encounters:   08/20/24 65.3 kg (144 lb)   08/13/24 65.3 kg (144 lb)   08/07/24 68.2 kg (150 lb 6.4 oz)   08/02/24 69.4 kg (153 lb)   08/01/24 69.7 kg (153 lb 9.6 oz)   07/31/24 70.3 kg (155 lb)   07/12/24 74.5 kg (164 lb 3.2 oz)   06/28/24 75.8 kg (167 lb)   06/25/24 76 kg (167 lb 9.6 oz)   Weight loss of 24 lb in the past 2 months ( 14.4%)    Dosing Weight: " 64.9 kg    ASSESSED NUTRITION NEEDS  Estimated Energy Needs: 1947+ kcals/day (30+ Hugo/Kg)  Justification: Repletion  Estimated Protein Needs: 78-97 grams protein/day (1.2 - 1.5 grams of pro/kg)  Justification: Wound healing  Estimated Fluid Needs: 1947 mL/day (30 ml/Kg)   Justification: Maintenance    NUTRITION DIAGNOSIS  Malnutrition related to acute on chronic illness as evidenced by 14.4% weight loss in the past 2 months, inadequate oral intake < 75%>/= 1 month and moderate subcutaneous fat and muscle loss      INTERVENTIONS  Implementation  Anticipate good and adequate intake going forward.  Will add to allow double protein portions on meals.   Add gel plus daily = 150 kcal, 20 g protein    Goals  Total avg nutritional intake to meet a minimum of 30 kcal/kg and 1.2+ g PRO/kg daily (per dosing wt 64.9 kg).- progressing, not met  Prevent further weight loss- progressing  Wound healing- in progress, AKA today     Monitoring/Evaluation  Progress toward goals will be monitored and evaluated per protocol.

## 2024-08-28 NOTE — OP NOTE
Operative Note    Name: Aubrey Duncan     Location: [facility] OR    Procedure Date:  8/25/2024 - 8/28/2024    PCP:  Hoa Olivarez    Procedure(s):  AMPUTATION, RIGHT BELOW KNEE     Pre-Procedure Diagnosis:    PAD (peripheral artery disease) (H24) [I73.9]     Post-Procedure Diagnosis:    Same     Surgeon(s):  Mario Steele MD Faizer, Rumi, MD     Findings:    At level of amputation good quality muscle with good blood supply and no signs of infection    Estimated Blood Loss:   100 cc's    Specimens:    ID Type Source Tests Collected by Time Destination   1 : Lower Right Leg Amputation, Non-Traumatic Leg, Below Knee, Right SURGICAL PATHOLOGY EXAM Smiley Jay MD 8/28/2024  4:35 PM           Implants:  * No implants in log *     Complications:    * No complications entered in OR log *     Brief Clinical Hx:  This is a 69-year-old gentleman with bilateral foot wounds with advanced infrapopliteal disease that has not been amenable to either open or endovascular revascularization.  At this point has nonhealing wounds from explorations to try to identify distal target for bypass and needs major amputation.  Had been scheduled for above-knee amputation but on review of his records we can see that he has inline flow to the popliteal artery with good collaterals beyond that, a warm lower leg, no knee contracture, and adequate calf muscle bulk.  The patient also would very much like to have the chance to try to walk with a prosthesis.  He was walking until a month ago.  Overall given the situation we decided to move forward with an attempted below-knee amputation recognizing the somewhat higher risk of wound failure compared to above-knee amputation but the significantly improved chance of ambulation with a prosthetic.    stable    Operative Details:  Patient was prepped and draped for access to his leg after regional block of been performed.  A tourniquet had been placed on the thigh and after exsanguination  of the limb with an Esmarch, the tourniquet was inflated to 250 mmHg pressure.  Starting 10 cm below the tibial tuberosity, a skin incision was marked out with a posterior flap extending another 10 or 12 cm caudally.  The patient was tested for sensation after the block and then an incision was made along these lines.  Cautery was used to divide subcutaneous tissue,fascia, and muscle to circumferentially dissect out the tibia and the fibula.  Periosteal elevator was used to clear the tibia of periosteum.  The tibia was transected with a 45 degree angle on its front surface using a power saw and the fibula was transected with a bone cutter about 2 cm above this level. Bones were smoothed with a rasp. The remainder of the specimen was then resected from the limb with an amputation knife leaving the posterior flap of gastrocnemius muscle tissue and skin.  We now identified the tibial nerve and transected it high allowing it to retract.  The neurovascular bundles were identified and ligated with 3-0 silk suture ligature.The tourniquet was then taken down and we took time to ensure good hemostasis.    Take time to irrigate wound with 500 cc of warm saline and we ensured hemostasis once more.  We then brought the posterior flap up and closed the wound with interrupted 2-0 Vicryl sutures in the fascia.  Skin was closed with staples.    The limb was then dressed in the usual fashion with Xeroform gauze over the suture line, 4 x 4's, Kerlix, and Ace wrap and then a stump protector.      MD Smiley Ledezma MD     Date: 8/28/2024  Time:5:31 PM

## 2024-08-29 ENCOUNTER — APPOINTMENT (OUTPATIENT)
Dept: RADIOLOGY | Facility: HOSPITAL | Age: 69
DRG: 856 | End: 2024-08-29
Attending: PHYSICIAN ASSISTANT
Payer: MEDICARE

## 2024-08-29 ENCOUNTER — TELEPHONE (OUTPATIENT)
Dept: TRANSPLANT | Facility: CLINIC | Age: 69
End: 2024-08-29
Payer: MEDICARE

## 2024-08-29 LAB
ALBUMIN UR-MCNC: 10 MG/DL
ANION GAP SERPL CALCULATED.3IONS-SCNC: 14 MMOL/L (ref 7–15)
ANION GAP SERPL CALCULATED.3IONS-SCNC: 14 MMOL/L (ref 7–15)
ANION GAP SERPL CALCULATED.3IONS-SCNC: 17 MMOL/L (ref 7–15)
APPEARANCE UR: ABNORMAL
BACTERIA SPT CULT: NORMAL
BASE EXCESS BLDV CALC-SCNC: -1.3 MMOL/L (ref -3–3)
BILIRUB UR QL STRIP: NEGATIVE
BUN SERPL-MCNC: 32.2 MG/DL (ref 8–23)
BUN SERPL-MCNC: 40.3 MG/DL (ref 8–23)
BUN SERPL-MCNC: 46.7 MG/DL (ref 8–23)
CALCIUM SERPL-MCNC: 7.8 MG/DL (ref 8.8–10.4)
CALCIUM SERPL-MCNC: 8 MG/DL (ref 8.8–10.4)
CALCIUM SERPL-MCNC: 8.4 MG/DL (ref 8.8–10.4)
CHLORIDE SERPL-SCNC: 100 MMOL/L (ref 98–107)
CHLORIDE SERPL-SCNC: 102 MMOL/L (ref 98–107)
CHLORIDE SERPL-SCNC: 99 MMOL/L (ref 98–107)
COLOR UR AUTO: YELLOW
CREAT SERPL-MCNC: 2.02 MG/DL (ref 0.67–1.17)
CREAT SERPL-MCNC: 2.2 MG/DL (ref 0.67–1.17)
CREAT SERPL-MCNC: 2.29 MG/DL (ref 0.67–1.17)
CREAT UR-MCNC: 95.5 MG/DL
EGFRCR SERPLBLD CKD-EPI 2021: 30 ML/MIN/1.73M2
EGFRCR SERPLBLD CKD-EPI 2021: 31 ML/MIN/1.73M2
EGFRCR SERPLBLD CKD-EPI 2021: 35 ML/MIN/1.73M2
ERYTHROCYTE [DISTWIDTH] IN BLOOD BY AUTOMATED COUNT: 14.3 % (ref 10–15)
FOLATE SERPL-MCNC: 6.6 NG/ML (ref 4.6–34.8)
FRACT EXCRET NA UR+SERPL-RTO: 0.7 %
GLUCOSE BLDC GLUCOMTR-MCNC: 134 MG/DL (ref 70–99)
GLUCOSE BLDC GLUCOMTR-MCNC: 158 MG/DL (ref 70–99)
GLUCOSE BLDC GLUCOMTR-MCNC: 190 MG/DL (ref 70–99)
GLUCOSE BLDC GLUCOMTR-MCNC: 245 MG/DL (ref 70–99)
GLUCOSE SERPL-MCNC: 133 MG/DL (ref 70–99)
GLUCOSE SERPL-MCNC: 150 MG/DL (ref 70–99)
GLUCOSE SERPL-MCNC: 193 MG/DL (ref 70–99)
GLUCOSE UR STRIP-MCNC: NEGATIVE MG/DL
GRAM STAIN RESULT: NORMAL
HCO3 BLDV-SCNC: 24 MMOL/L (ref 21–28)
HCO3 SERPL-SCNC: 19 MMOL/L (ref 22–29)
HCO3 SERPL-SCNC: 20 MMOL/L (ref 22–29)
HCO3 SERPL-SCNC: 22 MMOL/L (ref 22–29)
HCT VFR BLD AUTO: 25.3 % (ref 40–53)
HGB BLD-MCNC: 8 G/DL (ref 13.3–17.7)
HGB UR QL STRIP: ABNORMAL
HOLD SPECIMEN: NORMAL
KETONES UR STRIP-MCNC: NEGATIVE MG/DL
LEUKOCYTE ESTERASE UR QL STRIP: NEGATIVE
MCH RBC QN AUTO: 31.9 PG (ref 26.5–33)
MCHC RBC AUTO-ENTMCNC: 31.6 G/DL (ref 31.5–36.5)
MCV RBC AUTO: 101 FL (ref 78–100)
MRSA DNA SPEC QL NAA+PROBE: NEGATIVE
NITRATE UR QL: NEGATIVE
O2/TOTAL GAS SETTING VFR VENT: 50 %
OXYHGB MFR BLDV: 51 % (ref 70–75)
PCO2 BLDV: 43 MM HG (ref 40–50)
PH BLDV: 7.36 [PH] (ref 7.32–7.43)
PH UR STRIP: 5 [PH] (ref 5–7)
PLATELET # BLD AUTO: 398 10E3/UL (ref 150–450)
PO2 BLDV: 32 MM HG (ref 25–47)
POTASSIUM SERPL-SCNC: 4.6 MMOL/L (ref 3.4–5.3)
POTASSIUM SERPL-SCNC: 4.7 MMOL/L (ref 3.4–5.3)
POTASSIUM SERPL-SCNC: 4.8 MMOL/L (ref 3.4–5.3)
RBC # BLD AUTO: 2.51 10E6/UL (ref 4.4–5.9)
RBC URINE: 6 /HPF
SA TARGET DNA: NEGATIVE
SAO2 % BLDV: 52.9 % (ref 70–75)
SODIUM SERPL-SCNC: 134 MMOL/L (ref 135–145)
SODIUM SERPL-SCNC: 135 MMOL/L (ref 135–145)
SODIUM SERPL-SCNC: 138 MMOL/L (ref 135–145)
SODIUM UR-SCNC: 37 MMOL/L
SP GR UR STRIP: 1.02 (ref 1–1.03)
SPERM #/AREA URNS HPF: PRESENT /HPF
SQUAMOUS EPITHELIAL: <1 /HPF
TACROLIMUS BLD-MCNC: 11.9 UG/L (ref 5–15)
TME LAST DOSE: NORMAL H
TME LAST DOSE: NORMAL H
UROBILINOGEN UR STRIP-MCNC: <2 MG/DL
UUN UR-MCNC: 252 MG/DL (ref 801–1666)
VIT B12 SERPL-MCNC: 1620 PG/ML (ref 232–1245)
WBC # BLD AUTO: 11.3 10E3/UL (ref 4–11)
WBC URINE: 1 /HPF

## 2024-08-29 PROCEDURE — 250N000013 HC RX MED GY IP 250 OP 250 PS 637: Performed by: STUDENT IN AN ORGANIZED HEALTH CARE EDUCATION/TRAINING PROGRAM

## 2024-08-29 PROCEDURE — 99291 CRITICAL CARE FIRST HOUR: CPT | Mod: 24 | Performed by: STUDENT IN AN ORGANIZED HEALTH CARE EDUCATION/TRAINING PROGRAM

## 2024-08-29 PROCEDURE — 99418 PROLNG IP/OBS E/M EA 15 MIN: CPT | Performed by: STUDENT IN AN ORGANIZED HEALTH CARE EDUCATION/TRAINING PROGRAM

## 2024-08-29 PROCEDURE — 71045 X-RAY EXAM CHEST 1 VIEW: CPT

## 2024-08-29 PROCEDURE — 258N000003 HC RX IP 258 OP 636: Performed by: STUDENT IN AN ORGANIZED HEALTH CARE EDUCATION/TRAINING PROGRAM

## 2024-08-29 PROCEDURE — 94640 AIRWAY INHALATION TREATMENT: CPT

## 2024-08-29 PROCEDURE — 250N000012 HC RX MED GY IP 250 OP 636 PS 637: Performed by: STUDENT IN AN ORGANIZED HEALTH CARE EDUCATION/TRAINING PROGRAM

## 2024-08-29 PROCEDURE — 99233 SBSQ HOSP IP/OBS HIGH 50: CPT | Performed by: STUDENT IN AN ORGANIZED HEALTH CARE EDUCATION/TRAINING PROGRAM

## 2024-08-29 PROCEDURE — 250N000011 HC RX IP 250 OP 636: Performed by: INTERNAL MEDICINE

## 2024-08-29 PROCEDURE — 250N000011 HC RX IP 250 OP 636: Performed by: STUDENT IN AN ORGANIZED HEALTH CARE EDUCATION/TRAINING PROGRAM

## 2024-08-29 PROCEDURE — 82374 ASSAY BLOOD CARBON DIOXIDE: CPT | Performed by: STUDENT IN AN ORGANIZED HEALTH CARE EDUCATION/TRAINING PROGRAM

## 2024-08-29 PROCEDURE — 02HV33Z INSERTION OF INFUSION DEVICE INTO SUPERIOR VENA CAVA, PERCUTANEOUS APPROACH: ICD-10-PCS | Performed by: INTERNAL MEDICINE

## 2024-08-29 PROCEDURE — 250N000009 HC RX 250: Performed by: INTERNAL MEDICINE

## 2024-08-29 PROCEDURE — 80197 ASSAY OF TACROLIMUS: CPT | Performed by: STUDENT IN AN ORGANIZED HEALTH CARE EDUCATION/TRAINING PROGRAM

## 2024-08-29 PROCEDURE — 85014 HEMATOCRIT: CPT | Performed by: INTERNAL MEDICINE

## 2024-08-29 PROCEDURE — 82565 ASSAY OF CREATININE: CPT | Performed by: STUDENT IN AN ORGANIZED HEALTH CARE EDUCATION/TRAINING PROGRAM

## 2024-08-29 PROCEDURE — 84540 ASSAY OF URINE/UREA-N: CPT | Performed by: STUDENT IN AN ORGANIZED HEALTH CARE EDUCATION/TRAINING PROGRAM

## 2024-08-29 PROCEDURE — 36569 INSJ PICC 5 YR+ W/O IMAGING: CPT

## 2024-08-29 PROCEDURE — 999N000157 HC STATISTIC RCP TIME EA 10 MIN

## 2024-08-29 PROCEDURE — 999N000215 HC STATISTIC HFNC ADULT NON-CPAP

## 2024-08-29 PROCEDURE — 81001 URINALYSIS AUTO W/SCOPE: CPT | Performed by: STUDENT IN AN ORGANIZED HEALTH CARE EDUCATION/TRAINING PROGRAM

## 2024-08-29 PROCEDURE — 82746 ASSAY OF FOLIC ACID SERUM: CPT | Performed by: STUDENT IN AN ORGANIZED HEALTH CARE EDUCATION/TRAINING PROGRAM

## 2024-08-29 PROCEDURE — 84300 ASSAY OF URINE SODIUM: CPT | Performed by: STUDENT IN AN ORGANIZED HEALTH CARE EDUCATION/TRAINING PROGRAM

## 2024-08-29 PROCEDURE — 87633 RESP VIRUS 12-25 TARGETS: CPT | Performed by: STUDENT IN AN ORGANIZED HEALTH CARE EDUCATION/TRAINING PROGRAM

## 2024-08-29 PROCEDURE — 82805 BLOOD GASES W/O2 SATURATION: CPT | Performed by: INTERNAL MEDICINE

## 2024-08-29 PROCEDURE — 80048 BASIC METABOLIC PNL TOTAL CA: CPT | Performed by: INTERNAL MEDICINE

## 2024-08-29 PROCEDURE — 99233 SBSQ HOSP IP/OBS HIGH 50: CPT | Performed by: INTERNAL MEDICINE

## 2024-08-29 PROCEDURE — 3E043XZ INTRODUCTION OF VASOPRESSOR INTO CENTRAL VEIN, PERCUTANEOUS APPROACH: ICD-10-PCS | Performed by: INTERNAL MEDICINE

## 2024-08-29 PROCEDURE — P9045 ALBUMIN (HUMAN), 5%, 250 ML: HCPCS | Performed by: STUDENT IN AN ORGANIZED HEALTH CARE EDUCATION/TRAINING PROGRAM

## 2024-08-29 PROCEDURE — 87640 STAPH A DNA AMP PROBE: CPT | Performed by: STUDENT IN AN ORGANIZED HEALTH CARE EDUCATION/TRAINING PROGRAM

## 2024-08-29 PROCEDURE — 87641 MR-STAPH DNA AMP PROBE: CPT | Performed by: STUDENT IN AN ORGANIZED HEALTH CARE EDUCATION/TRAINING PROGRAM

## 2024-08-29 PROCEDURE — 82607 VITAMIN B-12: CPT | Performed by: STUDENT IN AN ORGANIZED HEALTH CARE EDUCATION/TRAINING PROGRAM

## 2024-08-29 PROCEDURE — 94640 AIRWAY INHALATION TREATMENT: CPT | Mod: 76

## 2024-08-29 PROCEDURE — 272N000452 HC KIT SHRLOCK 5FR POWER PICC TRIPLE LUMEN

## 2024-08-29 PROCEDURE — 200N000001 HC R&B ICU

## 2024-08-29 PROCEDURE — 87070 CULTURE OTHR SPECIMN AEROBIC: CPT | Performed by: STUDENT IN AN ORGANIZED HEALTH CARE EDUCATION/TRAINING PROGRAM

## 2024-08-29 RX ORDER — SULFAMETHOXAZOLE/TRIMETHOPRIM 800-160 MG
1 TABLET ORAL DAILY
Status: DISCONTINUED | OUTPATIENT
Start: 2024-08-30 | End: 2024-08-31

## 2024-08-29 RX ADMIN — MAGNESIUM OXIDE TAB 400 MG (241.3 MG ELEMENTAL MG) 400 MG: 400 (241.3 MG) TAB at 20:49

## 2024-08-29 RX ADMIN — NOREPINEPHRINE BITARTRATE 0.12 MCG/KG/MIN: 0.02 INJECTION, SOLUTION INTRAVENOUS at 00:24

## 2024-08-29 RX ADMIN — ACETAMINOPHEN 975 MG: 325 TABLET ORAL at 11:21

## 2024-08-29 RX ADMIN — HYDROMORPHONE HYDROCHLORIDE 2 MG: 2 TABLET ORAL at 00:01

## 2024-08-29 RX ADMIN — CLOPIDOGREL BISULFATE 75 MG: 75 TABLET ORAL at 08:25

## 2024-08-29 RX ADMIN — ONDANSETRON 4 MG: 2 INJECTION INTRAMUSCULAR; INTRAVENOUS at 08:40

## 2024-08-29 RX ADMIN — SODIUM CHLORIDE, POTASSIUM CHLORIDE, SODIUM LACTATE AND CALCIUM CHLORIDE 1000 ML: 600; 310; 30; 20 INJECTION, SOLUTION INTRAVENOUS at 08:40

## 2024-08-29 RX ADMIN — ACETAMINOPHEN 975 MG: 325 TABLET ORAL at 20:49

## 2024-08-29 RX ADMIN — ACETAMINOPHEN 650 MG: 325 TABLET ORAL at 00:01

## 2024-08-29 RX ADMIN — HYDROMORPHONE HYDROCHLORIDE 0.2 MG: 0.2 INJECTION, SOLUTION INTRAMUSCULAR; INTRAVENOUS; SUBCUTANEOUS at 22:41

## 2024-08-29 RX ADMIN — FUROSEMIDE 10 MG: 10 INJECTION, SOLUTION INTRAMUSCULAR; INTRAVENOUS at 03:03

## 2024-08-29 RX ADMIN — DAPSONE 50 MG: 25 TABLET ORAL at 08:26

## 2024-08-29 RX ADMIN — IPRATROPIUM BROMIDE AND ALBUTEROL SULFATE 3 ML: .5; 3 SOLUTION RESPIRATORY (INHALATION) at 15:36

## 2024-08-29 RX ADMIN — IPRATROPIUM BROMIDE AND ALBUTEROL SULFATE 3 ML: .5; 3 SOLUTION RESPIRATORY (INHALATION) at 07:14

## 2024-08-29 RX ADMIN — HYDROMORPHONE HYDROCHLORIDE 2 MG: 2 TABLET ORAL at 08:24

## 2024-08-29 RX ADMIN — ACYCLOVIR 400 MG: 400 TABLET ORAL at 08:26

## 2024-08-29 RX ADMIN — NOREPINEPHRINE BITARTRATE 0.08 MCG/KG/MIN: 0.02 INJECTION, SOLUTION INTRAVENOUS at 12:11

## 2024-08-29 RX ADMIN — INSULIN GLARGINE 18 UNITS: 100 INJECTION, SOLUTION SUBCUTANEOUS at 08:27

## 2024-08-29 RX ADMIN — POLYETHYLENE GLYCOL 3350 17 G: 17 POWDER, FOR SOLUTION ORAL at 08:17

## 2024-08-29 RX ADMIN — SULFAMETHOXAZOLE AND TRIMETHOPRIM 1 TABLET: 800; 160 TABLET ORAL at 08:27

## 2024-08-29 RX ADMIN — ALBUMIN HUMAN 25 G: 0.05 INJECTION, SOLUTION INTRAVENOUS at 15:05

## 2024-08-29 RX ADMIN — PREDNISONE 5 MG: 5 TABLET ORAL at 08:25

## 2024-08-29 RX ADMIN — PANTOPRAZOLE SODIUM 40 MG: 20 TABLET, DELAYED RELEASE ORAL at 08:25

## 2024-08-29 RX ADMIN — IPRATROPIUM BROMIDE AND ALBUTEROL SULFATE 3 ML: .5; 3 SOLUTION RESPIRATORY (INHALATION) at 11:25

## 2024-08-29 RX ADMIN — Medication 60 ML: at 08:28

## 2024-08-29 RX ADMIN — SENNOSIDES AND DOCUSATE SODIUM 1 TABLET: 8.6; 5 TABLET ORAL at 20:49

## 2024-08-29 RX ADMIN — ACYCLOVIR 400 MG: 400 TABLET ORAL at 20:50

## 2024-08-29 RX ADMIN — PIPERACILLIN AND TAZOBACTAM 3.38 G: 3; .375 INJECTION, POWDER, FOR SOLUTION INTRAVENOUS at 21:38

## 2024-08-29 RX ADMIN — INSULIN ASPART 2 UNITS: 100 INJECTION, SOLUTION INTRAVENOUS; SUBCUTANEOUS at 16:22

## 2024-08-29 RX ADMIN — FLUDROCORTISONE ACETATE 0.1 MG: 0.1 TABLET ORAL at 08:26

## 2024-08-29 RX ADMIN — ONDANSETRON 4 MG: 2 INJECTION INTRAMUSCULAR; INTRAVENOUS at 17:49

## 2024-08-29 RX ADMIN — ACETAMINOPHEN 975 MG: 325 TABLET ORAL at 05:01

## 2024-08-29 RX ADMIN — ASPIRIN 81 MG: 81 TABLET, COATED ORAL at 08:25

## 2024-08-29 RX ADMIN — Medication 1 TABLET: at 08:25

## 2024-08-29 RX ADMIN — HYDROCORTISONE SODIUM SUCCINATE 50 MG: 100 INJECTION, POWDER, FOR SOLUTION INTRAMUSCULAR; INTRAVENOUS at 21:38

## 2024-08-29 RX ADMIN — PIPERACILLIN AND TAZOBACTAM 3.38 G: 3; .375 INJECTION, POWDER, FOR SOLUTION INTRAVENOUS at 05:33

## 2024-08-29 RX ADMIN — TACROLIMUS 3 MG: 1 CAPSULE ORAL at 08:25

## 2024-08-29 RX ADMIN — SENNOSIDES AND DOCUSATE SODIUM 1 TABLET: 8.6; 5 TABLET ORAL at 08:25

## 2024-08-29 RX ADMIN — TACROLIMUS 1.5 MG: 0.5 CAPSULE ORAL at 20:51

## 2024-08-29 RX ADMIN — MAGNESIUM OXIDE TAB 400 MG (241.3 MG ELEMENTAL MG) 400 MG: 400 (241.3 MG) TAB at 08:25

## 2024-08-29 RX ADMIN — THERA TABS 1 TABLET: TAB at 08:25

## 2024-08-29 RX ADMIN — FLUTICASONE FUROATE AND VILANTEROL TRIFENATATE 1 PUFF: 100; 25 POWDER RESPIRATORY (INHALATION) at 08:35

## 2024-08-29 RX ADMIN — HYDROMORPHONE HYDROCHLORIDE 2 MG: 2 TABLET ORAL at 16:15

## 2024-08-29 RX ADMIN — Medication 1 TABLET: at 20:49

## 2024-08-29 RX ADMIN — IPRATROPIUM BROMIDE AND ALBUTEROL SULFATE 3 ML: .5; 3 SOLUTION RESPIRATORY (INHALATION) at 19:24

## 2024-08-29 RX ADMIN — INSULIN ASPART 1 UNITS: 100 INJECTION, SOLUTION INTRAVENOUS; SUBCUTANEOUS at 11:52

## 2024-08-29 RX ADMIN — PIPERACILLIN AND TAZOBACTAM 3.38 G: 3; .375 INJECTION, POWDER, FOR SOLUTION INTRAVENOUS at 14:18

## 2024-08-29 RX ADMIN — HYDROCORTISONE SODIUM SUCCINATE 50 MG: 100 INJECTION, POWDER, FOR SOLUTION INTRAMUSCULAR; INTRAVENOUS at 16:15

## 2024-08-29 RX ADMIN — HYDROCORTISONE SODIUM SUCCINATE 50 MG: 100 INJECTION, POWDER, FOR SOLUTION INTRAMUSCULAR; INTRAVENOUS at 11:21

## 2024-08-29 ASSESSMENT — ACTIVITIES OF DAILY LIVING (ADL)
ADLS_ACUITY_SCORE: 43

## 2024-08-29 NOTE — ANESTHESIA POSTPROCEDURE EVALUATION
Patient: Aubrey Duncan    Procedure: Procedure(s):  AMPUTATION, RIGHT BELOW KNEE       Anesthesia Type:  General    Note:  Disposition: ICU; Inpatient            ICU Sign Out: Anesthesiologist/ICU physician sign out WAS performed   Postop Pain Control: Uneventful            Sign Out: Well controlled pain   PONV: No   Neuro/Psych: Uneventful            Sign Out: Acceptable/Baseline neuro status   Airway/Respiratory: Uneventful            Sign Out: O2 supplementation; ABNORMAL respiratory status               Respiratory Exam: Normal RR; Unlabored               Oxygen: Face mask (BiPAP)   CV/Hemodynamics: Uneventful            Sign Out: Acceptable CV status; No obvious hypovolemia; No obvious fluid overload   Other NRE: NONE   DID A NON-ROUTINE EVENT OCCUR? No    Event details/Postop Comments:  Patient had a suspected aspiration event in the OR with low saturations in the 88-90 range upon arrival to PACU despite 10L O2 via facemask. Patient was started on BiPAP, CXR was acquired, and an ABG was drawn. Patient tolerated BiPAP very well and saturations improved to 95 percent. Patient became less somnolent during his stay in the PACU. His blood pressure was also low in PACU and treat with Albumin bolus and IM ephedrine. I initiated ICU transfer given his tenuous respiratory status and concern for possible aspiration pneumonitis. Formal handoff was given to the ICU provider via phone call.           Last vitals:  Vitals Value Taken Time   BP 91/50 08/28/24 1920   Temp 36.2  C (97.2  F) 08/28/24 1920   Pulse 114 08/28/24 1920   Resp 24 08/28/24 1920   SpO2 95 % 08/28/24 1917   Vitals shown include unfiled device data.    Electronically Signed By: Juwan Judd MD  August 28, 2024  8:01 PM

## 2024-08-29 NOTE — PROGRESS NOTES
Pulmonary/Critical Care Consult Team Note    Aubrey Duncan,  1955, MRN [id number]  Admitting Dx: PAD (peripheral artery disease) (H24) [I73.9]  Wound dehiscence [T81.30XA]  SHELLEY (acute kidney injury) (H24) [N17.9]  Sepsis (H) [A41.9]  Date / Time of Admission:  2024  4:46 PM    Overnight Events:  Intake/Output last 3 shifts:  I/O last 3 completed shifts:  In: 707 [P.O.:477; I.V.:230]  Out: 1750 [Urine:1750]    Post Op BKA - minimal Blood loss, dry on closure  No signs of infection, puss or active inflammation%  Some episodes of hypotension (thought to be anesthesia related)  Post-op pt became increasingly hypoxic requiring bipap.    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    Hypotension - multifactorial  - will give one dose hydrocortisone (on florinef PTA)  - peripheral levophed until PICC placed  - will start levophed to keep MAP >65    ID: Right leg wound infection s/p I&D and treated with Abx and now s/p BKA   - Cultures with Stenotrophomonas and Enterococcus  - ID following  - on Bactrim and Zosyn  - vancomycin discontinued 24     PULM: Hx of alpha 1 antitrypsin dz s/p lung transplant () with post-op hypoxia  - CXR, ABG with hypoxia   - will hold off on bipap while nauseated, on HFNC  - continue acyclovir and dapsone  - holding PTA  azithromycin 3x weekly while on empiric abx as noted above   - continue PTA prednisone 2.5mg am, 5mg PM and tacrolimus 3/3, Pharmacy to help with levels  - will continue PTA fludrocortisone (taking due to Calcineurin inhibitor related hyperkalemia)   - continue home inhalers     Vasc: right lower extremity wound infection and ischemic limb s/p BKA on 8/28  - wound looks clean and dry, no hematoma  - wound care per vasc post-op orders  - continue Zosyn, Ance  - Bactrim and Acyclovir for immunosuppressed     CV: Hx of HTN and CAD  - holding anti-HTN meds  - aspirin, clopidogrel  - Monitor on tele    GI: being treated for Post Transplant Lymphoproliferative disorder  - NPO while on bipap  - GI proph    RENAL: CKD  - on fludrocortisone for hyperkalemia  - baseline Cr of 1  - Follow BUN/Creatinine  - strict I/O's    NEURO: pain control and peripheral neuropathy    ENDO: Hx of DM  - continue home lantus 18u  - Critical Care hyperglycemic protocol  - Accuchecks and sliding scale qmeals    Pt has critical illness and impairs breathing on bipap such as there is high probability of imminent of life threatening deterioration in the patient's condition.        Code Status: FULL CODE    Infusions:  Current Facility-Administered Medications   Medication Dose Route Frequency Provider Last Rate Last Admin    lactated ringers infusion   Intravenous Continuous Darwin Pennington  mL/hr at 08/28/24 1455 New Bag at 08/28/24 1455    lactated ringers infusion   Intravenous Continuous Darwin Pennington MD        sodium chloride 0.9 % infusion   Intravenous Continuous Kenneth Sales DO 75 mL/hr at 08/28/24 0927 New Bag at 08/28/24 0927       ICU DAILY CHECKLIST           Can patient transfer out of MICU? no  FAST HUG:  Feeding:  Feeding: Yes  Naranjo:{Yes  Analgesia/Sedation:Yes  Thromboembolic prophylaxis:SCDs and Contraindication  HOB>30:  Yes  Stress Ulcer Protocol Active: Yes; Mode: PPI/H2 Antagonist  Glycemic Control:  "No components found for: \"GLU\" Any glucose > 180 yes; Mode of Insulin Therapy: Sliding Scale Insulin and Long Acting Insulin  INTUBATED:  PHYSICAL THERAPY AND MOBILITY: Can patient have PT and mobility trial: yes Activity: ICU mobility protocol    Critical Care Time excluding procedures and family discussions greater than: 1 Hour    Risk Factors Present on Admission:  Clinically Significant Risk Factors              # Hypoalbuminemia: Lowest albumin = 2.8 g/dL at 8/27/2024  5:22 AM, will monitor as appropriate     # Hypertension: Noted on problem list            # Severe Malnutrition: based on nutrition assessment, PRESENT ON ADMISSION   # Financial/Environmental Concerns: none                      Yakelin Wallace, DO  Pulmonary and Critical Care Attending  pgr [phone]    Allergies   Allergen Reactions    Heparin Other (See Comments)     HIT positive and UAGUST positive    No Heparin Antibody Identified on 8/15 blood test    Oxycodone Other (See Comments)     Significant lethargy, confusion. Tolerates dilaudid well.     Fluocinolone Other (See Comments)     Tendon problems      Gabapentin Nausea and Vomiting    Levaquin Muscle Pain (Myalgia)    Pneumococcal Vaccine Other (See Comments)     Other reaction(s): Fever  \"My arm swelled up like a balloon.\"    Varicella Zoster Immune Globulin Swelling       Meds: See MAR    Physical Exam:  BP (!) 88/53   Pulse 84   Temp 98.3  F (36.8  C) (Oral)   Resp 24   Ht 1.727 m (5' 8\")   Wt 67.7 kg (149 lb 4 oz)   SpO2 (!) 88%   BMI 22.69 kg/m    Intake/Output this shift:  I/O this shift:  In: 700 [I.V.:700]  Out: -   GEN: sitting up in bed, NAD  HEENT: MMM, HFNC in place  CVS: tachy regular rhythm, no murmurs  RESP: CTA BL, no wheezing, no rhonchi, no increased resp effort, talking in full sentences   ABD: Soft, No abdominal pain with palpation, no guarding, no rigidity  EXT: Right BKA with dry dressing, no hematoma around incision, re-wrapped  NEURO: nauseated, non focal, " answering all questions  PSYCH: pleasant    Pertinent Labs: Latest lab results in EHR personally reviewed.   CMP  Recent Labs   Lab 08/28/24  1751 08/28/24  1422 08/28/24  1141 08/28/24  0749 08/28/24  0521 08/27/24  0815 08/27/24 0522 08/26/24  0746 08/26/24 0528   NA  --  138  --   --  138  --  139  --  139   POTASSIUM  --  4.4  --   --  4.5  --  4.8  --  5.2   CHLORIDE  --  102  --   --  104  --  104  --  105   CO2  --  25  --   --  25  --  26  --  25   ANIONGAP  --  11  --   --  9  --  9  --  9   * 107* 100* 76 97   < > 129*   < > 85   BUN  --  19.5  --   --  22.2  --  22.0  --  33.4*   CR  --  0.94  --   --  0.90  --  0.82  --  1.11   GFRESTIMATED  --  87  --   --  >90  --  >90  --  71   ERIN  --  8.3*  --   --  8.1*  --  7.9*  --  8.1*   PROTTOTAL  --   --   --   --   --   --  5.3*  --   --    ALBUMIN  --   --   --   --   --   --  2.8*  --   --    BILITOTAL  --   --   --   --   --   --  0.3  --   --    ALKPHOS  --   --   --   --   --   --  109  --   --    AST  --   --   --   --   --   --  32  --   --    ALT  --   --   --   --   --   --  20  --   --     < > = values in this interval not displayed.     CBC  Recent Labs   Lab 08/28/24  1832 08/28/24  1422 08/27/24 0522 08/26/24 0528   WBC 3.3* 8.5 8.2 8.2   RBC 2.66* 3.31* 2.97* 3.04*   HGB 8.5* 10.5* 9.6* 9.6*   HCT 27.9* 33.6* 30.3* 31.4*   * 102* 102* 103*   MCH 32.0 31.7 32.3 31.6   MCHC 30.5* 31.3* 31.7 30.6*   RDW 14.5 14.4 14.3 14.6    302 270 279     INRNo lab results found in last 7 days.  Arterial Blood Gas  Recent Labs   Lab 08/28/24  1828   PH 7.39   PCO2 39   PO2 77*   HCO3 23   O2PER 85       Cultures: personally reviewed.   08/25/2024 1702 08/28/2024 1425 Wound Aerobic Bacterial Culture Routine With Gram Stain [[id number]]    (Abnormal)   Wound from Leg, Below Knee, Right    Final result Component Value   Culture 4+ Stenotrophomonas maltophilia Abnormal     4+ Enterococcus faecalis Abnormal     4+ Normal jcarlos   Gram Stain  "Result 4+ Gram negative bacilli Abnormal     4+ Gram positive cocci Abnormal     2+ WBC seen Abnormal        Susceptibility     Stenotrophomonas maltophilia Enterococcus faecalis     DELTA DELTA     Ampicillin   <=2 ug/mL Susceptible     Gentamicin Synergy   Susceptible... Susceptible 1     Levofloxacin 2 ug/mL Susceptible       Minocycline <=1 ug/mL Susceptible       Trimethoprim/Sulfamethoxazole <=2/38 ug/mL Susceptible       Vancomycin   1 ug/mL Susceptible                1 No high level gentamicin resistance found - therefore combination therapy with an aminoglycoside may be indicated for serious enterococcal infections such as bacteremia and endocarditis.         Susceptibility Comments    Stenotrophomonas maltophilia   Antibiotics listed as \"No Interpretation\" have no regulatory guidelines for susceptibility/resistance available.           Imaging: personally reviewed.     No results found for this or any previous visit.    Results for orders placed during the hospital encounter of 05/16/23    CT Chest Pulmonary Embolism w Contrast    Narrative  PROCEDURE:  CT CHEST PULMONARY EMBOLISM W CONTRAST.    HISTORY:  Evaluate for pulmonary embolism.  History of PEs, evaluate  for resolution; Popliteal artery thrombosis, right (H)    TECHNIQUE:  Initial pre-contrast  and localizer images were  obtained.  Contrast enhanced helical CT pulmonary angiography was then  performed.  Routine transaxial and post-processed (multiplanar and/or  MIP) reformations were obtained. This CT exam was performed using one  or more the following dose reduction techniques: automated exposure  control, adjustment of the mA and/or kV according to patient size,  and/or iterative reconstruction technique.    COMPARISON:  Radiographs 5/2/2023. No prior CTA of the chest is  available for comparison.    MEDS/CONTRAST: 75 ml isovue 370    PULMONARY CTA FINDINGS:  This is a diagnostic quality helical CT  pulmonary angiogram.  There is no acute " pulmonary embolism to the  first subsegmental pulmonary artery level.    OTHER FINDINGS:    The neck base is grossly symmetric. The heart is enlarged and there  are atherosclerotic calcifications of the coronary arteries.  There is  no pericardial or pleural effusion. The thoracic aorta is within  normal limits in size. The main pulmonary artery is borderline  dilated, which can be seen in pulmonary hypertension. No abnormal  thoracic adenopathy is identified.    Limited views of the upper abdomen demonstrate a 3.3 x 4.1 cm  low-density mass of the pancreatic head, new when compared to 2017.    The pleura is without thickening or effusions. The central airways are  unremarkable. No focal consolidation or intrapulmonary mass is  identified.    No suspicious osseous lesion is identified.    Impression  IMPRESSION:    No pulmonary emboli to the subsegmental level.    4.1 cm posterior pancreatic head mass. Recommend dedicated MR abdomen  with and without contrast for further assessment.    BECKY NGUYEN MD      SYSTEM ID:  [id number]    No results found for this or any previous visit.    No results found for this or any previous visit.        Patient Active Problem List   Diagnosis    Alpha-1-antitrypsin deficiency (H)    S/P lung transplant (H)    Steroid-induced diabetes mellitus (H24)    CMV (cytomegalovirus infection) (H)    Prophylactic antibiotic    Chronic obstructive pulmonary disease (H)    Coronary atherosclerosis    Contact dermatitis and eczema    Gout    Male erectile dysfunction, unspecified    Osteopenia    Seborrheic keratosis    Thoracic back pain    Thoracic segment dysfunction    Gastroesophageal reflux disease, esophagitis presence not specified    Diverticulosis of large intestine without hemorrhage    Essential hypertension    H/O basal cell carcinoma excision    Type 2 diabetes mellitus with diabetic polyneuropathy, with long-term current use of insulin (H)    Chronic kidney disease, stage 3b  (H)    Acute renal failure superimposed on stage 3 chronic kidney disease, unspecified acute renal failure type, unspecified whether stage 3a or 3b CKD (H)    Tubular adenoma    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)    Unstable angina (H)    Non-pressure chronic ulcer of other part of right lower leg with unspecified severity (H)    Popliteal artery thrombosis, right (H)    Other chronic pulmonary embolism without acute cor pulmonale (H)    GIB (gastrointestinal bleeding)    PAD (peripheral artery disease) (H24)    S/P coronary artery stent placement    PTLD (post-transplant lymphoproliferative disorder) (H)    Diabetes mellitus with peripheral vascular disease (H)    Hematoma of skin    Wound dehiscence    SHELLEY (acute kidney injury) (H24)    Sepsis (H)       Yakelin Wallace DO  Pulmonary and Critical Care Attending  pgr [phone]    Securely message with the Vocera Web Console (learn more here)

## 2024-08-29 NOTE — PROGRESS NOTES
"VASCULAR SURGERY PROGRESS NOTE    Subjective:  Patient was seen and evaluated at the bedside for surgical follow up. Pain is controlled. Had a suspected aspiration event in the OR, started on BiPAP and transferred to ICU post operatively. Continues on high flow NC this AM and levophed for pressor support.     Objective:  Intake/Output Summary (Last 24 hours) at 8/29/2024 0910  Last data filed at 8/29/2024 0900  Gross per 24 hour   Intake 1183.4 ml   Output 200 ml   Net 983.4 ml     PHYSICAL EXAM:  BP (!) 82/53   Pulse 104   Temp 98.3  F (36.8  C) (Oral)   Resp 23   Ht 1.727 m (5' 8\")   Wt 67.7 kg (149 lb 4 oz)   SpO2 93%   BMI 22.69 kg/m    General: The patient is alert and oriented. Appropriate. No acute distress  Psych: Pleasant affect, answers questions appropriately  Skin: Color appropriate for race, warm, dry.  Respiratory: Normal respiratory effort on supplemental oxygen   Extremities: Clean and dry dressing intact to right BKA, limb protector in place      Imaging:   Pertinent imaging reviewed    ASSESSMENT:  69-year-old gentleman with bilateral foot wounds with advanced infrapopliteal disease that has not been amenable to either open or endovascular revascularization. Now POD#1 right below knee amputation.     Hypoxemic respiratory failure post operatively from likely aspiration event.    PLAN:  Dressing change to BKA tomorrow, 8/30  Consult to prosthetics team  Encourage incentive spirometry and pulmonary hygiene, wean oxygen as tolerated  Repeat CXR today  Pain control PRN  Appreciate hospitalist input    Discussed pt history, exam, assessment and plan with Dr. Steele (vascular resident) of the vascular surgery service, who is in agreement with the above.    Svetlana Escobedo PA-C  VASCULAR SURGERY                   "

## 2024-08-29 NOTE — TELEPHONE ENCOUNTER
[facility] ICU calling to speak with audra HARDING on-call.     Admitted on 8/25:admitted for RLE wound infection now s/p R BKA.  Postoperative course complicated by hypotension requiring vasopressors, respiratory failure and anemia requiring PRBC.

## 2024-08-29 NOTE — PROGRESS NOTES
S: Patient seen at ([facility]) with an order for a (Right) (BK) (protector) ordered by (Svetlana Escobedo PA-C).  Patient states: He came out of surgery with a Sher Tech on his RT BK.  Patient reported functional abilities prior to amputation: NA  O: I supplied the Waist belt and interface sock and Info Bag  Visual inspection: The Sher tech had the black neoprene closures in the reverse order and too many distal pads were in the socket that actually touched the end of his limb. I removed the thick distal pad to give room distally and reversed the black neoprene closures and fit the waist belt and showed him the sock to use as interface once the ace wrap is lacking as an interface.  Measurement of limb: NA  Products and sizes dispensed: Waist belt, sock and info pack.  Prosthetic packet and business card left with patient.  A: Patient is a good candidate for: K-3  The goal of the new prosthesis is to: Return to activities prior to amputation.  Potential K level: K-3  Motivation for prosthetic device: Good.  P: Anticipated DC is Unknown  Will contact patient 1 week from today to check on status.  This note has been electronically signed by Burak Cameron CPO, LPO.

## 2024-08-29 NOTE — PROGRESS NOTES
"Critical Care Progress Note     08/29/2024     Name: Aubrey Duncan MRN#: [id number]   Age: 69 year old YOB: 1955        Summary     Past medical history of A1AT deficiency with multiple sequelae requiring bilateral lung transplant (2013; on azathioprine, tacrolimus, prednisone), chronic lung allograft dysfunction, bronchiolitis obliterans syndrome, recurrent CMV viremia, PE-DVT, HIT (2013), CAD (1/11/24 MEGHNA to LAD), severe PAD with bilateral lower extremity limb threatening ischemia, preserved biventricular systolic function, T2DM, CKD 3, esophageal stricture, gout    Presented 8/25/24 for right lower extremity wound infection treated with broad spectrum antimicrobials. 8/28/24 underwent below knee amputation under local anesthesia complicated by aspiration of gastric contents with progressive hypoxemic respiratory failure & hypovolemic-distributive shock       Interval Events     FiO2 40%, 35 LPM, afebrile, low dose NE 0.06. Marginal UO 50 mL since 0000. Cr 2 (0.94), BUN up 32.2, last VBG 7.36/43, CBC WC up 11.3, Hgb stable from post-op value 8, plts 398. 8/28/24 radiograph demonstrated patchy right mid-lung opacities.     Cardiac POCUS, poor equipment, grossly preserved biventricular function, left ventricular \"kissing\" sign, could not visualize IVC    [place] - 1) check MRSA nares, sputum culture, viral panel; 2) remains on pip-tazo & TMP/SMX, consider adding vanco pending studies; 3) 1 L IVF, follow for further resuscitation; 4) checking tacro levels; 5) consider fondaparinux vs apixaban for VTE prophylaxis pending SHELLEY trajectory      Assessment & Plan      Goals of Care:  Life prolonging without limits      Neurology, Psychiatry, Sedation, Analgesia:  Alert & oriented     Analgesia   - scheduled APAP  - prn hydromorphone      Cardiovascular:  Hypovolemic-distributive shock   Possible contribution from relative adrenal insufficiency in the setting of chronic steroid use. Warm extremities. Not " "edematous. 8/29/24 limited cardiac POCUS grossly preserved biventricular function, left ventricular \"kissing\" sign, could not visualize IVC  - NE goal MAP>65  - hydrocortisone 50 mg q6h, restarting pta low dose prednisone upon discontinuation   - 1 LR, followed by 500 mL 5% albumin, monitor for further resuscitation     CAD requiring PCI  Severe PAD s/p R-BKA  - ASA, clopidogrel, statin     Hypertension   - hold pta carvedilol  - hold pta amlodipine     Respiratory, Airway:  Acute hypoxemic respiratory failure  Nosocomial aspiration pneumonia/pneumonitis   Observed aspiration intra-operatively under local anesthesia & sedation. 8/19/24 radiograph demonstrated patchy right mid-lung opacities.   - antimicrobials & infectious evaluation, as below   - pulmonary hygiene: acapella QID, IS Q2H while awake, out of bed to chair, PT/OT as able     A1AT s/p bilateral lung transplant 2013  Chronic lung allograft dysfunction, bronchiolitis obliterans syndrome  No evidence of exacerbation of obstructive physiology or increased work of breathing   - discussed with , transplant pulmonary at [facility]  - hold pta prednisone 5 mg qam & 2.5 mg qpm while on stress dose hydrocortisone  - continue pta fludrocortisone 0.1 mg every day   - recued pta tacrolimus from 3 mg to 1.5 mg q12h, trough this evening ordered (goal 8-10)  - pta fluticasone-vilaterol every day   - duonebs q4h while awake     Gastrointestinal:  Nomral hepatobiliary indices     GERD  - pta PPI     Esophageal stricture   Reportedly underwent dilation in the past. Chronic difficulty swallowing solids. Reports that his symptoms have not recently changed   - consider GI consultation, barium esophogram     Renal, Acid-base, Electrolytes, Volume:  Non-oliguric SHELLEY on CKD  - hold pta furosemide 20mg every day   - IVF boluses, check tacro trough levels   - trend BMP & UO, strict I&O, daily weight    Infectious Disease:  Nosocomial aspiration pneumonitis/pneumonia "   Witnessed aspiration event 8/28/24. 8/19/24 radiograph demonstrated patchy right mid-lung opacities.   - broad spectrum antimicrobials, as below, with GNR-Pseudomonas coverage  - check MRSA nares, sputum culture, viral panel     Right lower extremity wound infection s/p below knee amputation   Cultures demonstrated Stenotrophomonas & Enterococcus   - ID was consulted   - received vancomycin (8/25 - 8/28)   - pip-tazo (8/25-  current)  - TMP SMX (8/26 - current)    Prophylaxis  - pta dapsone & acyclovir     Hematology, Oncology:  Leukocytosis secondary to physiology stress vs sepsis     Acute on chronic macrocytic anemia   - check B12, folate  - consider peripheral smear     History of HIT  + anti-PF4 antibodies & AUGUST in 2013  - generally, lifelong avoidance of heparin   - consider fondaparinux vs apixaban for VTE prophylaxis pending SHELLEY trajectory     Endocrine:  T2DM  - reduce glargine from 18 to 12 units qam   - MDSSI     Checklist:  FEN: regular diet   VTE ppx: on hold, consider fondaparinux vs apixaban pending SHELLEY trajectory   GI ppx: pta PPI   Bowel regimen: senna & PEG scheduled   Lines/tube-size: LUE PICC 8/29, PIVs  Skin: R-BKA site clean & dry   PT/OT/SLP, early mobility: not consulted   Code Status: full       Physical Exam      Neurologic: Alert & oriented. =Opens eyes, tracks, & follows commands in all extremities symmetrically.   HEENT: Head and face normal. No nasal discharge. Oropharynx normal. Eyelids, conjunctiva, & sclera normal.   Neck: Neck appearance normal. No neck masses. Thyroid not enlarged.  Respiratory: Lungs clear bilaterally. No wheezes, crackles, or rhonchi.   Cardiovascular: Regular rate & rhythm  Normal S1 & S2. No murmurs, rubs. or gallops. No elevated JVP.    Gastrointestinal: Soft. Nontender.   Musculoskeletal: Skeletal configuration normal and muscle mass normal for age. Joint appearance overall normal.  Skin, Hair, & Nails: Chronic pigmentation changes extremities. Hair & nails  normal.  Extremities: No peripheral edema. R-below knee amputation.      All pertinent vital signs, ventilator settings, I&Os, laboratory, microbiology, ECGs, & imaging data has been personally reviewed. Total Critical Care time, excluding procedures, was 50 minutes     DAVID Zepeda MD  Critical Care Medicine

## 2024-08-29 NOTE — PROGRESS NOTES
CLINICAL NUTRITION SERVICES - BRIEF NOTE     Nutrition Prescription    RECOMMENDATIONS FOR MDs/PROVIDERS TO ORDER:    Malnutrition Status:    Severe in acute on chronic illness     Recommendations already ordered by Registered Dietitian (RD):  - Adjust supplements, Glucerna BID per pt request     Future/Additional Recommendations:  Adjust supplements pending intake, weight, tolerance, acceptance, labs, BG, wound healing      EVALUATION OF THE PROGRESS TOWARD GOALS   Diet: None   Nutrition Supplement: Glucerna daily, expedite bottle daily, Gel Plus daily   Intake/Tolerance:   Met with pt at bedside this afternoon. Pt reports tolerating po intakes poorly, requesting additional nutrition supplements    Pt taking expedite this AM     NEW FINDINGS   Discussed pt in care rounds this AM. NPO with BiPAP 8/28. Diet orders cancelled 8/28 and not reordered. Pt reports not receiving nutrition supplement Glucerna today, not sent likely r/t need for diet order. RD addressed need for diet order at care rounds this AM and attending physician this afternoon.     BKA 8/28     MALNUTRITION  Malnutrition Diagnosis: Severe in acute on chronic illness     INTERVENTIONS  Implementation  Medical food supplement therapy- Glucerna BID, Continue Gel plus and Expedite     Monitoring/Evaluation  Progress toward goals will be monitored and evaluated per protocol.

## 2024-08-29 NOTE — PROGRESS NOTES
[facility]    Infectious Disease Progress Note     08/29/2024         Assessment & Plan   Aubrey Duncan is a 69 year old male who was admitted on 8/25/2024.     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 An underlying airspace inflammatory process could also be present. 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  S/P BKA on 8/28  Comorbid conditions: Immunosuppression, peripheral vascular disease, chronic kidney disease, type 2 diabetes        RECOMMENDATIONS:    CT chest with history of lung transplant, new hypoxia  MRSA screen negative, appreciate input from ICU staff.  Antibiotics-trimethoprim/sulfamethoxazole, dosing discussed with pharmacy, continue Zosyn  Stopped vancomycin on 8/28/2024   Follow culture results   Focus/de-escalate antibiotics based on final culture results  Monitor CBC, CMP  Prophylaxis-dapsone, azithromycin-prior to admission  Pulmonary team, discussed with Dr. Reaves -transplant pulmonary at [facility].  ID will follow  Patient, patient's nurse      Patricia Palacios MD  [facility]  [phone]      Interval History   Chart reviewed  On high flow nasal cannula-updated patient  Previous note:  Patient in OR    Previous note:    Pain control  Wife at bedside and updated  Antibiotic management discussed with pharmacy      Physical Exam   Temp: 98.2  F (36.8  C) Temp src: Oral BP: (!) 161/71 Pulse: 87   Resp: 18 SpO2: 96 % O2 Device: High Flow Nasal Cannula (HFNC) Oxygen Delivery: (S) 30 LPM (down from  35)  Vitals:    08/25/24 1554 08/27/24 0749   Weight: 64.9 kg (143 lb) 67.7 kg (149 lb 4 oz)     Vital Signs with Ranges  Temp:  [95.4  F (35.2  C)-98.6  F (37  C)] 98.2  F (36.8  C)  Pulse:  [] 87  Resp:  [15-35] 18  BP: ()/(43-75) 161/71  FiO2 (%):  [40 %-100 %] 44 %  SpO2:  [84 %-99 %] 96 %    Constitutional: Awake, no apparent distress  Lungs: High flow nasal cannula, coarse  Cardiovascular: Regular rate and rhythm, S1 S2  Abdomen: non distended  Skin: Dressing on lower extremity-post op dressing  Neuro: deconditioned  Psych: able to answer questions      Photo prior to BKA      Medications   Current Facility-Administered Medications   Medication Dose Route Frequency Provider Last Rate Last Admin    lactated ringers infusion   Intravenous Continuous Mario Steele MD        norepinephrine (LEVOPHED) 4 mg in  mL infusion PREMIX  0.01-0.6 mcg/kg/min Intravenous Continuous Yakelin Wallace, DO 15.2 mL/hr at 08/29/24 1223 0.06 mcg/kg/min at 08/29/24 1223     Current Facility-Administered Medications   Medication Dose Route Frequency Provider Last Rate Last Admin    acetaminophen (TYLENOL) tablet 975 mg  975 mg Oral Q8H Mario Steele MD   975 mg at 08/29/24 1121    acyclovir (ZOVIRAX) tablet 400 mg  400 mg Oral BID Mario Steele MD   400 mg at 08/29/24 0826    [Held by provider] amLODIPine (NORVASC) tablet 10 mg  10 mg Oral Daily Mario Steele MD   10 mg at 08/27/24 0917    aspirin EC tablet 81 mg  81 mg Oral Daily Mario Steele MD   81 mg at 08/29/24 0825    calcium carbonate-vitamin D (OSCAL) 500-5 MG-MCG per tablet 1 tablet  1 tablet Oral BID w/meals DoMario martin MD   1 tablet at 08/29/24 0825    [Held by provider] carvedilol (COREG) tablet 6.25 mg  6.25 mg Oral BID w/meals Doenges, Mario Edward, MD   6.25 mg at 08/28/24 0913    ceFAZolin Sodium (ANCEF) injection 2 g  2 g Intravenous Pre-Op/Pre-procedure x 1 dose Mario Steele MD         ceFAZolin Sodium (ANCEF) injection 2 g  2 g Intravenous See Admin Instructions Mario Steele MD        clopidogrel (PLAVIX) tablet 75 mg  75 mg Oral Daily Mario Steele MD   75 mg at 08/29/24 0825    dapsone (ACZONE) tablet 50 mg  50 mg Oral Daily DoMario martin MD   50 mg at 08/29/24 0826    fludrocortisone (FLORINEF) tablet 0.1 mg  0.1 mg Oral Daily DoMario martin MD   0.1 mg at 08/29/24 0826    fluticasone-vilanterol (BREO ELLIPTA) 100-25 MCG/ACT inhaler 1 puff  1 puff Inhalation Daily Mario Steele MD   1 puff at 08/29/24 0835    [Held by provider] furosemide (LASIX) tablet 20 mg  20 mg Oral Daily Mario Steele MD   20 mg at 08/28/24 0913    hydrocortisone sodium succinate PF (solu-CORTEF) injection 50 mg  50 mg Intravenous Q6H Vladislav Zepeda MD   50 mg at 08/29/24 1121    insulin aspart (NovoLOG) injection (RAPID ACTING)  1-7 Units Subcutaneous TID  Mario Steele MD   1 Units at 08/29/24 1152    insulin aspart (NovoLOG) injection (RAPID ACTING)  1-5 Units Subcutaneous At Bedtime Mario Steele MD        [START ON 8/30/2024] insulin glargine (LANTUS PEN) injection 12 Units  12 Units Subcutaneous QAM  Vladislav Zepeda MD        ipratropium - albuterol 0.5 mg/2.5 mg/3 mL (DUONEB) neb solution 3 mL  3 mL Nebulization Q4H Yakelin Cat DO   3 mL at 08/29/24 1125    [Held by provider] lisinopril (ZESTRIL) tablet 40 mg  40 mg Oral Daily Kenneth Sales, DO   40 mg at 08/27/24 0918    magnesium oxide (MAG-OX) tablet 400 mg  400 mg Oral BID Mario Steele MD   400 mg at 08/29/24 0825    multivitamin, therapeutic (THERA-VIT) tablet 1 tablet  1 tablet Oral Daily Mario Steele MD   1 tablet at 08/29/24 0825    pantoprazole (PROTONIX) EC tablet 40 mg  40 mg Oral Daily Mario Steele MD   40 mg at 08/29/24 0825    piperacillin-tazobactam (ZOSYN) 3.375 g vial to attach to  mL bag  3.375 g Intravenous Q8H Mario Steele MD    3.375 g at 08/29/24 0533    polyethylene glycol (MIRALAX) Packet 17 g  17 g Oral Daily DoMario martin MD   17 g at 08/29/24 0817    [Held by provider] predniSONE (DELTASONE) tablet 2.5 mg  2.5 mg Oral QPM DoMario martin MD   2.5 mg at 08/28/24 2225    [Held by provider] predniSONE (DELTASONE) tablet 5 mg  5 mg Oral QAM DoMario martin MD   5 mg at 08/29/24 0825    rosuvastatin (CRESTOR) tablet 20 mg  20 mg Oral Q Mon Wed Fri AM Mario Steele MD   20 mg at 08/28/24 0912    senna-docusate (SENOKOT-S/PERICOLACE) 8.6-50 MG per tablet 1 tablet  1 tablet Oral BID Mario Steele MD   1 tablet at 08/29/24 0825    sodium chloride (PF) 0.9% PF flush 3 mL  3 mL Intracatheter Q8H DoMario martin MD   3 mL at 08/29/24 0842    sodium chloride (PF) 0.9% PF flush 3 mL  3 mL Intracatheter Q8H DoMario martin MD   3 mL at 08/29/24 1125    sodium chloride (PF) 0.9% PF flush 3 mL  3 mL Intracatheter Q8H DoMario martin MD   3 mL at 08/29/24 1125    sulfamethoxazole-trimethoprim (BACTRIM DS) 800-160 MG per tablet 1 tablet  1 tablet Oral BID Mario Steele MD   1 tablet at 08/29/24 0827    tacrolimus (GENERIC EQUIVALENT) capsule 3 mg  3 mg Oral QAM Mario Steele MD   3 mg at 08/29/24 0825    And    tacrolimus (GENERIC EQUIVALENT) capsule 3 mg  3 mg Oral QPM Mario Steele MD   3 mg at 08/28/24 2224    wound support modular (EXPEDITE) bottle 60 mL  60 mL Oral Daily DoMario martin MD   60 mL at 08/29/24 0828       Data   All microbiology laboratory data reviewed.  Recent Labs   Lab Test 08/29/24  0400 08/28/24  1832 08/28/24  1422   WBC 11.3* 3.3* 8.5   HGB 8.0* 8.5* 10.5*   HCT 25.3* 27.9* 33.6*   * 105* 102*    267 302     Recent Labs   Lab Test 08/29/24  1202 08/29/24  0400 08/28/24  1422   CR 2.29* 2.02* 0.94     Recent Labs   Lab Test 08/25/24  1632   SED 52*     Recent Labs   Lab Test 07/06/21  1101 11/27/20  1430 10/16/20  0939  09/11/20  0818 07/16/20  0856 07/02/20  1042 06/30/20  1056 02/27/19  0737 02/25/19  1814   CULT Heavy growth  Klebsiella pneumoniae  *  Heavy growth  Pseudomonas aeruginosa  * Moderate growth  Pseudomonas aeruginosa  * Heavy growth  Pseudomonas aeruginosa  Some antibiotics have been suppressed due to the known inducible beta lactamase activity.   Please contact Microbiology for more information.  * Heavy growth  Escherichia coli  *  Heavy growth  Enterococcus faecalis  * Moderate growth  Stenotrophomonas maltophilia  * No growth after 6 days  No growth after 6 days Heavy growth  Klebsiella pneumoniae  * Canceled, Test credited  >10 Squamous epithelial cells/low power field indicates oral contamination. Please   recollect.  *  Notification of test cancellation was given to   SOHA VALLE RN @0951 2/27/19. CT   No growth       MICROBIOLOGY:    Reviewed    7-Day Micro Results       Collected Updated Procedure Result Status      08/29/2024 0846 08/29/2024 0915 MRSA MSSA PCR, Nasal Swab [[id number]]   Swab from Nare, Right    In process Component Value   No component results            08/25/2024 1702 08/28/2024 1425 Wound Aerobic Bacterial Culture Routine With Gram Stain [[id number]]    (Abnormal)   Wound from Leg, Below Knee, Right    Final result Component Value   Culture 4+ Stenotrophomonas maltophilia    4+ Enterococcus faecalis    4+ Normal jcarlos   Gram Stain Result 4+ Gram negative bacilli    4+ Gram positive cocci    2+ WBC seen        Susceptibility        Stenotrophomonas maltophilia      DELTA      Ceftazidime 4 ug/mL Susceptible  [*]       Ceftazidime Avibactam 4 ug/mL No interpretation available  [*]       Ceftolozane/Tazobactam 8 ug/mL No interpretation available  [*]       Levofloxacin 2 ug/mL Susceptible      Minocycline <=1 ug/mL Susceptible      Trimethoprim/Sulfamethoxazole <=2/38 ug/mL Susceptible                   [*]  Suppressed Antibiotic               Susceptibility        Enterococcus  "faecalis      DELTA      Ampicillin <=2 ug/mL Susceptible      Ciprofloxacin <=0.5 ug/mL Susceptible  [*]       Doxycycline <=0.5 ug/mL Susceptible  [*]       Gentamicin Synergy Susceptible ug/mL Susceptible  [1]       Levofloxacin 1 ug/mL Susceptible  [*]       Linezolid 2 ug/mL Susceptible  [*]       Nitrofurantoin <=16 ug/mL Susceptible  [*]       Streptomycin Synergy Susceptible ug/mL Susceptible  [*]       Tigecycline <=0.12 ug/mL Susceptible  [*]       Vancomycin 1 ug/mL Susceptible                   [*]  Suppressed Antibiotic     [1]  No high level gentamicin resistance found - therefore combination therapy with an aminoglycoside may be indicated for serious enterococcal infections such as bacteremia and endocarditis.               Susceptibility Comments       Stenotrophomonas maltophilia    Antibiotics listed as \"No Interpretation\" have no regulatory guidelines for susceptibility/resistance available.               08/25/2024 1643 08/28/2024 1816 Blood Culture Arm, Right [[id number]]   Blood from Arm, Right    Preliminary result Component Value   Culture No growth after 3 days  [P]                         RADIOLOGY:    Reviewed  POC US Guidance Needle Placement    Result Date: 8/1/2024  Ultrasound was performed as guidance to an anesthesia procedure.  Click \"PACS images\" hyperlink below to view any stored images.  For specific procedure details, view procedure note authored by anesthesia.     Medical Decision Making       MANAGEMENT DISCUSSED with the following over the past 24 hours: patient, ICU physician, nurse   NOTE(S)/MEDICAL RECORDS REVIEWED over the past 24 hours: reviewed  Tests ORDERED & REVIEWED in the past 24 hours:  - See lab/imaging results included in the data section of the note  Medical complexity over the past 24 hours:  - Intensive monitoring for MEDICATION TOXICITY  - Decision regarding ESCALATION OF LEVEL OF CARE          "

## 2024-08-29 NOTE — PROGRESS NOTES
Patient was transported to ICU room on 15lpm simple mask and then placed back on bipap once patient arrived to room. Currently on BIPAP. ST 12/8 18 70%. RT will continue to monitor.

## 2024-08-29 NOTE — PROCEDURES
"PICC Line Insertion Procedure Note  Pt. Name: Aubrey Duncan  MRN:        [id number]    Procedure: Insertion of a  triple Lumen  5 fr  Bard SOLO (valved) Power PICC, Lot number [id number]    Indications: Vascular access    Contraindications : none    Procedure Details     Patient identified with 2 identifiers and \"Time Out\" conducted.  .     Central line insertion bundle followed: hand hygiene performed prior to procedure, site cleansed with cholraprep, hat, mask, sterile gloves, sterile gown worn, patient draped with maximum barrier head to toe drape, sterile field maintained.    The vein was assessed and found to be compressible and of adequate size.     Lidocaine 1% 2 ml administered sq to the insertion site. A 5 Fr PICC was inserted into the brachial vein of the left arm with ultrasound guidance. 2 attempt(s) required to access vein.   Catheter threaded without difficulty. Good blood return noted.    Modified Seldinger Technique used for insertion.    The 8 sharps that are included in the PICC insertion kit were accounted for and disposed of in the sharps container prior to breakdown of the sterile field.    Catheter secured with Statlock, biopatch and Tegaderm dressing applied.    Findings:    Total catheter length  46 cm, with 0 cm exposed. Mid upper arm circumference is 25 cm. Catheter was flushed with 30 cc NS. Patient  tolerated procedure well.    Tip placement verified by 3CG technology. Tip placement in the SVC/RA junction.    CLABSI prevention brochure left at bedside.    Patient's primary RN notified PICC is ready for use.      Comments:  Unsuccessful Right arm PICC insertion, catheter met resistance at axilla region.        Marino PUCKETTCstatrevor RN      "

## 2024-08-29 NOTE — PLAN OF CARE
Goal Outcome Evaluation:      Plan of Care Reviewed With: patient    Overall Patient Progress: improvingOverall Patient Progress: improving       [facility] - ICU    RN Progress Note:            Pertinent Assessments:      Please refer to flowsheet rows for full assessment     A&Ox4, calm, cooperative. Pain controlled. Patient educated about possible catheter need, pt refuses at this time. Decreased UOP, Lasix given per PRN order, retaining <400cc urine. Monitoring via bladder scan. ICU provider notified 0500 hrs, no new orders given.           Key Events - This Shift:       Dilauded 0.2mg IV x1 for pain TO RLE   High flow NC decreased to 40L, FiO2 50%   Levophed at 0.08mcg/kg/min   PLTs                Barriers to Discharge / Downgrade:     Pressor

## 2024-08-29 NOTE — PROGRESS NOTES
[facility]    Medicine Progress Note - Hospitalist Service    Date of Admission:  8/25/2024    Assessment & Plan   Aubrey Duncan is a 69 year old male with PMH lung transplant (2013), PAD, chronic limb threatening ischemia, CAD, HTN, HLD, COPD, GERD, DM type II, and CKD is admitted on 8/25/2024 for right lower extremity wound infection.     Right lower extremity wound infection  -Wound dehiscence positive for purulent drainage  -Wound culture: stenotrophomonas, Enterococcus faecalis preliminary results   -Infectious disease consulted, abx per ID  -continue Bactrim and Zosyn (vancomycin discontinued 8/28/24)  -Vascular consulted, s/p BKA per vascular, transferred to ICU post-procedure for hemodynamic and respiratory management   -Blood cultures pending  -WOC consulted  -PTA Tylenol, Dilaudid for pain control   -PT/OT when appropriate post-op      Hypotension  -anticipate a degree of adrenal insufficiency with surgery (patient on chronic prednisone), s/p IV steroid per ICU   -management of pressors per ICU team   -hold home HTN meds   -would avoid opiate meds if able     Acute hypoxic respiratory failure  -suspected aspiration event debra-operatively, no fevers to suggest aspiration pneumonia but agree with repeat CXR per vascular   -CXR 8/28/24 without noted vascular congestion, mention of atelectasis and inflammation which may be ?pneumonitis    -add incentive spirometry  -encourage activity as able   -on high flow  -wean as able    SHELLEY on CKD  Lactic acidosis- resolved   -SHELLEY resolved initially, now back after post-op hypotension   -continue fluids  -continue pressure support   -Trend BMP    PAD  -PTA aspirin, clopidogrel  -vascular following    Macrocytic anemia  -no s/s of bleed  -stable hgb  -likely combination of dilutional and immunosuppressive   -continue to monitor      DM type II  -Hemoglobin A1c 4.2   -continue PTA Lantus  -Sliding scale insulin  -hypoglycemia protocol       CAD  -PTA aspirin, carvedilol     HTN  -holding meds  -pain management    HLD  -PTA rosuvastatin     Hx lung transplant  -continue acyclovir, dapsone ppx  -holding PTA azithromycin 3x weekly while on empiric abx as noted above   -continue PTA prednisone and tacrolimus unless s/s of progressing infection  -will continue PTA fludrocortisone (taking due to CNI related hyperkalemia)   -continue home inhalers         Diet: Snacks/Supplements Adult: Gelatein Plus; Between Meals  Snacks/Supplements Adult: Glucerna; Between Meals  Moderate Consistent Carb (60 g CHO per Meal) Diet    DVT Prophylaxis: SCDs  Naranjo Catheter: Not present  Lines: PRESENT      PICC 08/29/24 Triple Lumen Left Brachial vein medial Vasopressor,Access-Site Assessment: WDL    Cardiac Monitoring: ACTIVE order. Indication: ICU  Code Status: Full Code      Clinically Significant Risk Factors              # Hypoalbuminemia: Lowest albumin = 2.8 g/dL at 8/27/2024  5:22 AM, will monitor as appropriate    # Acute Kidney Injury, unspecified: based on a >150% or 0.3 mg/dL increase in last creatinine compared to past 90 day average, will monitor renal function  # Hypertension: Noted on problem list              # Severe Malnutrition: based on nutrition assessment, PRESENT ON ADMISSION   # Financial/Environmental Concerns: none               Disposition Plan     Medically Ready for Discharge: Anticipated in 2-4 Days             Kenneth Sales DO  Hospitalist Service  [facility]  Securely message with [employer] (more info)  Text page via [facility] Paging/Directory   ______________________________________________________________________    Interval History   Seen in ICU today. States a bit of pain but otherwise feeling ok.     Physical Exam   Vital Signs: Temp: 98.2  F (36.8  C) Temp src: Oral BP: (!) 161/71 Pulse: 87   Resp: 18 SpO2: 96 % O2 Device: High Flow Nasal Cannula (HFNC) Oxygen Delivery: (S) 30 LPM (down from 35)  Weight: 149 lbs 4.02  "oz    General Appearance: Alert, nontoxic, cooperative and no acute distress  Respiratory: CTAB, high-flow in place   Cardiovascular: RRR, no murmur   GI: no pain  Skin: BKA covered in clean bandage without any serosanguineous or purulent drainage, no significant surrounding erythema   Other: Alert and oriented      Medical Decision Making       65 MINUTES SPENT BY ME on the date of service doing chart review, history, exam, documentation & further activities per the note.      Data     I have personally reviewed the following data over the past 24 hrs:    11.3 (H)  \   8.0 (L)   / 398     134 (L) 100 40.3 (H) /  150 (H)   4.8 20 (L) 2.29 (H) \       Imaging results reviewed over the past 24 hrs:   Recent Results (from the past 24 hour(s))   POC US Guidance Needle Placement    Narrative    Ultrasound was performed as guidance to an anesthesia procedure.  Click   \"PACS images\" hyperlink below to view any stored images.  For specific   procedure details, view procedure note authored by anesthesia.   XR Chest Port 1 View    Narrative    EXAM: XR CHEST PORT 1 VIEW  LOCATION: [facility]  DATE: 8/28/2024    INDICATION: Postoperative desaturation; clinical suspicion of aspiration.  COMPARISON: 5/16/2024.      Impression    IMPRESSION: There are new patchy nodular airspace opacities in the right mid to lower lung, possibly representing infectious/inflammatory infiltrate. Short-term imaging follow-up to resolution is recommended.    Unchanged scarring within the left lung base.    No pleural effusion or pneumothorax.    Unchanged cardiomegaly. Transverse sternotomy wires. Atherosclerotic calcifications of the thoracic aorta.   XR Chest Port 1 View    Narrative    EXAM: XR CHEST PORT 1 VIEW  LOCATION: [facility]  DATE: 8/29/2024    INDICATION: Postoperative desaturation; clinical suspicion of aspiration  COMPARISON: Portable AP view the chest 8/28/2024      Impression    " IMPRESSION:     Left PICC terminates in the middle third of the SVC. Clamshell sternal wires are present. Mild atheromatous aortic calcifications. Cardiac silhouette is normal in size.    Shallow lung inflation. Heterogeneous opacities are present within the medial lower lobes including some angular components consistent with atelectasis. An underlying airspace inflammatory process could also be present. Diaphragmatic curvature is   preserved. No visible pleural fluid. No pneumothorax.

## 2024-08-29 NOTE — PROGRESS NOTES
"Care Management Follow Up    Length of Stay (days): 4    Expected Discharge Date: 08/31/2024    Anticipated Discharge Plan:  Transitional Care    Transportation: TBD    PT Recommendations:  Likely will need Transitional care (TCU) for continued therapy and skilled nursing.  OT Recommendations:   Likely will need Transitional care (TCU) for continued therapy and skilled nursing.     Barriers to Discharge: medical stability, IV abx, IV pain meds, diagnostic workup, Surgery; highflow oxygen;    Prior Living Situation: house (\"We live in a double wide. It has a ramp to get inside the house and then it is all 1 level inside. It is at [address] [place] [address].\" The facesheet lists a PO box for the mailing address.) with spouse, parent(s)    Discussed  Partnership in Safe Discharge Planning  document with patient/family: No     Handoff Completed: No, handoff not indicated or clinically appropriate    Patient/Spokesperson Updated: No    Additional Information:  CM will continue to monitor progression of care, review team recommendations and provide discharge planning assist as needed.      Next Steps: Care management will follow along and watch for therapy recommendations.     Alma Boyd RN    "

## 2024-08-30 ENCOUNTER — APPOINTMENT (OUTPATIENT)
Dept: CT IMAGING | Facility: HOSPITAL | Age: 69
DRG: 856 | End: 2024-08-30
Attending: INTERNAL MEDICINE
Payer: MEDICARE

## 2024-08-30 PROBLEM — N17.8 OTHER ACUTE KIDNEY FAILURE (H): Status: ACTIVE | Noted: 2024-08-30

## 2024-08-30 LAB
ABO/RH(D): NORMAL
ANION GAP SERPL CALCULATED.3IONS-SCNC: 13 MMOL/L (ref 7–15)
ANION GAP SERPL CALCULATED.3IONS-SCNC: 13 MMOL/L (ref 7–15)
ANTIBODY SCREEN: NEGATIVE
BACTERIA BLD CULT: NO GROWTH
BLD PROD TYP BPU: NORMAL
BLD PROD TYP BPU: NORMAL
BLOOD COMPONENT TYPE: NORMAL
BLOOD COMPONENT TYPE: NORMAL
BUN SERPL-MCNC: 53.4 MG/DL (ref 8–23)
BUN SERPL-MCNC: 55.1 MG/DL (ref 8–23)
C PNEUM DNA SPEC QL NAA+PROBE: NOT DETECTED
CALCIUM SERPL-MCNC: 7.8 MG/DL (ref 8.8–10.4)
CALCIUM SERPL-MCNC: 7.9 MG/DL (ref 8.8–10.4)
CHLORIDE SERPL-SCNC: 102 MMOL/L (ref 98–107)
CHLORIDE SERPL-SCNC: 103 MMOL/L (ref 98–107)
CODING SYSTEM: NORMAL
CODING SYSTEM: NORMAL
CREAT SERPL-MCNC: 1.87 MG/DL (ref 0.67–1.17)
CREAT SERPL-MCNC: 2.01 MG/DL (ref 0.67–1.17)
CROSSMATCH: NORMAL
CROSSMATCH: NORMAL
EGFRCR SERPLBLD CKD-EPI 2021: 35 ML/MIN/1.73M2
EGFRCR SERPLBLD CKD-EPI 2021: 38 ML/MIN/1.73M2
ERYTHROCYTE [DISTWIDTH] IN BLOOD BY AUTOMATED COUNT: 14.5 % (ref 10–15)
FLUAV H1 2009 PAND RNA SPEC QL NAA+PROBE: NOT DETECTED
FLUAV H1 RNA SPEC QL NAA+PROBE: NOT DETECTED
FLUAV H3 RNA SPEC QL NAA+PROBE: NOT DETECTED
FLUAV RNA SPEC QL NAA+PROBE: NOT DETECTED
FLUBV RNA SPEC QL NAA+PROBE: NOT DETECTED
GLUCOSE BLDC GLUCOMTR-MCNC: 114 MG/DL (ref 70–99)
GLUCOSE BLDC GLUCOMTR-MCNC: 117 MG/DL (ref 70–99)
GLUCOSE BLDC GLUCOMTR-MCNC: 128 MG/DL (ref 70–99)
GLUCOSE BLDC GLUCOMTR-MCNC: 139 MG/DL (ref 70–99)
GLUCOSE BLDC GLUCOMTR-MCNC: 80 MG/DL (ref 70–99)
GLUCOSE SERPL-MCNC: 118 MG/DL (ref 70–99)
GLUCOSE SERPL-MCNC: 130 MG/DL (ref 70–99)
HADV DNA SPEC QL NAA+PROBE: NOT DETECTED
HCO3 SERPL-SCNC: 22 MMOL/L (ref 22–29)
HCO3 SERPL-SCNC: 22 MMOL/L (ref 22–29)
HCOV PNL SPEC NAA+PROBE: NOT DETECTED
HCT VFR BLD AUTO: 17 % (ref 40–53)
HCT VFR BLD AUTO: 21.2 % (ref 40–53)
HGB BLD-MCNC: 5.5 G/DL (ref 13.3–17.7)
HGB BLD-MCNC: 7 G/DL (ref 13.3–17.7)
HMPV RNA SPEC QL NAA+PROBE: NOT DETECTED
HPIV1 RNA SPEC QL NAA+PROBE: NOT DETECTED
HPIV2 RNA SPEC QL NAA+PROBE: NOT DETECTED
HPIV3 RNA SPEC QL NAA+PROBE: NOT DETECTED
HPIV4 RNA SPEC QL NAA+PROBE: NOT DETECTED
ISSUE DATE AND TIME: NORMAL
ISSUE DATE AND TIME: NORMAL
M PNEUMO DNA SPEC QL NAA+PROBE: NOT DETECTED
MCH RBC QN AUTO: 32.2 PG (ref 26.5–33)
MCHC RBC AUTO-ENTMCNC: 32.4 G/DL (ref 31.5–36.5)
MCV RBC AUTO: 99 FL (ref 78–100)
PLATELET # BLD AUTO: 214 10E3/UL (ref 150–450)
POTASSIUM SERPL-SCNC: 3.9 MMOL/L (ref 3.4–5.3)
POTASSIUM SERPL-SCNC: 4 MMOL/L (ref 3.4–5.3)
RBC # BLD AUTO: 1.71 10E6/UL (ref 4.4–5.9)
RSV RNA SPEC QL NAA+PROBE: NOT DETECTED
RSV RNA SPEC QL NAA+PROBE: NOT DETECTED
RV+EV RNA SPEC QL NAA+PROBE: NOT DETECTED
SODIUM SERPL-SCNC: 137 MMOL/L (ref 135–145)
SODIUM SERPL-SCNC: 138 MMOL/L (ref 135–145)
SPECIMEN EXPIRATION DATE: NORMAL
TACROLIMUS BLD-MCNC: 6.1 UG/L (ref 5–15)
TME LAST DOSE: NORMAL H
TME LAST DOSE: NORMAL H
UNIT ABO/RH: NORMAL
UNIT ABO/RH: NORMAL
UNIT NUMBER: NORMAL
UNIT NUMBER: NORMAL
UNIT STATUS: NORMAL
UNIT STATUS: NORMAL
UNIT TYPE ISBT: 6200
UNIT TYPE ISBT: 6200
WBC # BLD AUTO: 10.1 10E3/UL (ref 4–11)

## 2024-08-30 PROCEDURE — 94799 UNLISTED PULMONARY SVC/PX: CPT

## 2024-08-30 PROCEDURE — P9016 RBC LEUKOCYTES REDUCED: HCPCS | Performed by: INTERNAL MEDICINE

## 2024-08-30 PROCEDURE — 250N000009 HC RX 250: Performed by: INTERNAL MEDICINE

## 2024-08-30 PROCEDURE — 80048 BASIC METABOLIC PNL TOTAL CA: CPT | Performed by: STUDENT IN AN ORGANIZED HEALTH CARE EDUCATION/TRAINING PROGRAM

## 2024-08-30 PROCEDURE — 99233 SBSQ HOSP IP/OBS HIGH 50: CPT | Mod: 24 | Performed by: STUDENT IN AN ORGANIZED HEALTH CARE EDUCATION/TRAINING PROGRAM

## 2024-08-30 PROCEDURE — 250N000012 HC RX MED GY IP 250 OP 636 PS 637: Performed by: STUDENT IN AN ORGANIZED HEALTH CARE EDUCATION/TRAINING PROGRAM

## 2024-08-30 PROCEDURE — 86923 COMPATIBILITY TEST ELECTRIC: CPT | Performed by: INTERNAL MEDICINE

## 2024-08-30 PROCEDURE — 250N000013 HC RX MED GY IP 250 OP 250 PS 637: Performed by: HOSPITALIST

## 2024-08-30 PROCEDURE — 99233 SBSQ HOSP IP/OBS HIGH 50: CPT | Performed by: HOSPITALIST

## 2024-08-30 PROCEDURE — 71250 CT THORAX DX C-: CPT | Mod: MA

## 2024-08-30 PROCEDURE — 85027 COMPLETE CBC AUTOMATED: CPT | Performed by: STUDENT IN AN ORGANIZED HEALTH CARE EDUCATION/TRAINING PROGRAM

## 2024-08-30 PROCEDURE — 86900 BLOOD TYPING SEROLOGIC ABO: CPT | Performed by: INTERNAL MEDICINE

## 2024-08-30 PROCEDURE — 99232 SBSQ HOSP IP/OBS MODERATE 35: CPT | Performed by: INTERNAL MEDICINE

## 2024-08-30 PROCEDURE — 999N000215 HC STATISTIC HFNC ADULT NON-CPAP

## 2024-08-30 PROCEDURE — 999N000157 HC STATISTIC RCP TIME EA 10 MIN

## 2024-08-30 PROCEDURE — P9016 RBC LEUKOCYTES REDUCED: HCPCS | Performed by: HOSPITALIST

## 2024-08-30 PROCEDURE — 120N000001 HC R&B MED SURG/OB

## 2024-08-30 PROCEDURE — 250N000013 HC RX MED GY IP 250 OP 250 PS 637: Performed by: STUDENT IN AN ORGANIZED HEALTH CARE EDUCATION/TRAINING PROGRAM

## 2024-08-30 PROCEDURE — 85018 HEMOGLOBIN: CPT | Performed by: HOSPITALIST

## 2024-08-30 PROCEDURE — 86923 COMPATIBILITY TEST ELECTRIC: CPT | Performed by: HOSPITALIST

## 2024-08-30 PROCEDURE — 250N000011 HC RX IP 250 OP 636: Performed by: STUDENT IN AN ORGANIZED HEALTH CARE EDUCATION/TRAINING PROGRAM

## 2024-08-30 PROCEDURE — 94640 AIRWAY INHALATION TREATMENT: CPT

## 2024-08-30 PROCEDURE — 999N000287 HC ICU ADULT ROUNDING, EACH 10 MINS

## 2024-08-30 PROCEDURE — 272N000202 HC AEROBIKA WITH MANOMETER

## 2024-08-30 PROCEDURE — 250N000013 HC RX MED GY IP 250 OP 250 PS 637: Performed by: INTERNAL MEDICINE

## 2024-08-30 RX ORDER — TACROLIMUS 1 MG/1
3 CAPSULE ORAL EVERY MORNING
Status: DISCONTINUED | OUTPATIENT
Start: 2024-08-31 | End: 2024-09-03

## 2024-08-30 RX ORDER — UREA 10 %
500 LOTION (ML) TOPICAL AT BEDTIME
Status: DISCONTINUED | OUTPATIENT
Start: 2024-08-30 | End: 2024-09-04 | Stop reason: HOSPADM

## 2024-08-30 RX ORDER — PREDNISONE 5 MG/1
5 TABLET ORAL EVERY MORNING
Status: DISCONTINUED | OUTPATIENT
Start: 2024-08-30 | End: 2024-09-04 | Stop reason: HOSPADM

## 2024-08-30 RX ORDER — TACROLIMUS 1 MG/1
3 CAPSULE ORAL EVERY EVENING
Status: DISCONTINUED | OUTPATIENT
Start: 2024-08-30 | End: 2024-09-03

## 2024-08-30 RX ORDER — IPRATROPIUM BROMIDE AND ALBUTEROL SULFATE 2.5; .5 MG/3ML; MG/3ML
3 SOLUTION RESPIRATORY (INHALATION) EVERY 4 HOURS PRN
Status: DISCONTINUED | OUTPATIENT
Start: 2024-08-30 | End: 2024-09-04 | Stop reason: HOSPADM

## 2024-08-30 RX ADMIN — IPRATROPIUM BROMIDE AND ALBUTEROL SULFATE 3 ML: .5; 3 SOLUTION RESPIRATORY (INHALATION) at 08:01

## 2024-08-30 RX ADMIN — ACETAMINOPHEN 975 MG: 325 TABLET ORAL at 04:40

## 2024-08-30 RX ADMIN — PIPERACILLIN AND TAZOBACTAM 3.38 G: 3; .375 INJECTION, POWDER, FOR SOLUTION INTRAVENOUS at 14:21

## 2024-08-30 RX ADMIN — HYDROCORTISONE SODIUM SUCCINATE 50 MG: 100 INJECTION, POWDER, FOR SOLUTION INTRAMUSCULAR; INTRAVENOUS at 04:40

## 2024-08-30 RX ADMIN — LOPERAMIDE HYDROCHLORIDE 2 MG: 2 CAPSULE ORAL at 02:07

## 2024-08-30 RX ADMIN — THERA TABS 1 TABLET: TAB at 08:30

## 2024-08-30 RX ADMIN — TACROLIMUS 3 MG: 1 CAPSULE ORAL at 20:35

## 2024-08-30 RX ADMIN — Medication 1 TABLET: at 18:03

## 2024-08-30 RX ADMIN — PREDNISONE 2.5 MG: 2.5 TABLET ORAL at 20:35

## 2024-08-30 RX ADMIN — HYDROMORPHONE HYDROCHLORIDE 2 MG: 2 TABLET ORAL at 10:56

## 2024-08-30 RX ADMIN — PREDNISONE 5 MG: 5 TABLET ORAL at 08:46

## 2024-08-30 RX ADMIN — CLOPIDOGREL BISULFATE 75 MG: 75 TABLET ORAL at 08:30

## 2024-08-30 RX ADMIN — ROSUVASTATIN 20 MG: 10 TABLET, FILM COATED ORAL at 08:30

## 2024-08-30 RX ADMIN — Medication 500 MG: at 20:34

## 2024-08-30 RX ADMIN — FLUDROCORTISONE ACETATE 0.1 MG: 0.1 TABLET ORAL at 08:31

## 2024-08-30 RX ADMIN — MAGNESIUM OXIDE TAB 400 MG (241.3 MG ELEMENTAL MG) 400 MG: 400 (241.3 MG) TAB at 08:30

## 2024-08-30 RX ADMIN — ACETAMINOPHEN 975 MG: 325 TABLET ORAL at 20:34

## 2024-08-30 RX ADMIN — PIPERACILLIN AND TAZOBACTAM 3.38 G: 3; .375 INJECTION, POWDER, FOR SOLUTION INTRAVENOUS at 22:18

## 2024-08-30 RX ADMIN — HYDROMORPHONE HYDROCHLORIDE 4 MG: 4 TABLET ORAL at 20:34

## 2024-08-30 RX ADMIN — Medication 1 TABLET: at 08:30

## 2024-08-30 RX ADMIN — SULFAMETHOXAZOLE AND TRIMETHOPRIM 1 TABLET: 800; 160 TABLET ORAL at 11:28

## 2024-08-30 RX ADMIN — LOPERAMIDE HYDROCHLORIDE 2 MG: 2 CAPSULE ORAL at 10:50

## 2024-08-30 RX ADMIN — FLUTICASONE FUROATE AND VILANTEROL TRIFENATATE 1 PUFF: 100; 25 POWDER RESPIRATORY (INHALATION) at 08:45

## 2024-08-30 RX ADMIN — PANTOPRAZOLE SODIUM 40 MG: 20 TABLET, DELAYED RELEASE ORAL at 08:30

## 2024-08-30 RX ADMIN — ASPIRIN 81 MG: 81 TABLET, COATED ORAL at 08:31

## 2024-08-30 RX ADMIN — LOPERAMIDE HYDROCHLORIDE 2 MG: 2 CAPSULE ORAL at 18:05

## 2024-08-30 RX ADMIN — PIPERACILLIN AND TAZOBACTAM 3.38 G: 3; .375 INJECTION, POWDER, FOR SOLUTION INTRAVENOUS at 05:47

## 2024-08-30 RX ADMIN — LOPERAMIDE HYDROCHLORIDE 2 MG: 2 CAPSULE ORAL at 05:19

## 2024-08-30 RX ADMIN — ACETAMINOPHEN 975 MG: 325 TABLET ORAL at 11:28

## 2024-08-30 RX ADMIN — ACYCLOVIR 400 MG: 400 TABLET ORAL at 20:35

## 2024-08-30 RX ADMIN — DAPSONE 50 MG: 25 TABLET ORAL at 08:31

## 2024-08-30 RX ADMIN — ACYCLOVIR 400 MG: 400 TABLET ORAL at 08:32

## 2024-08-30 RX ADMIN — TACROLIMUS 1.5 MG: 0.5 CAPSULE ORAL at 08:31

## 2024-08-30 ASSESSMENT — ACTIVITIES OF DAILY LIVING (ADL)
ADLS_ACUITY_SCORE: 51
ADLS_ACUITY_SCORE: 43
ADLS_ACUITY_SCORE: 51

## 2024-08-30 NOTE — PROGRESS NOTES
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 8/28/24 underwent below knee amputation under local anesthesia complicated by aspiration of gastric contents with progressive hypoxemic respiratory failure & hypovolemic-distributive shock       Interval Events     Improved oxygenation. Off vasopressors. Marginal UO, improving with IVF. BMP improved Cr. CBC Hgb 5.5, unclear if spurious or not, given 1 unit    Tacro trough drawn before evening dose 6.9 which reflects his prior to admission / maintenance dose. Perhaps notably, his pta amlodipine has been held & this agent inhibits CYP enzymes that metabolize tacro; concurrent therapy may raise tacro levels; thus, when he's taking the amlodipine his tacro levels may be higher & within his therapeutic range (?).     [place] - 1) add-on recheck Hgb to ensure accuracy of 5.5 mg/dL; 2) remains on maintenance IVF while holding pta diuretic & lisinopril for SHELLEY; 3) discussed tacrolimus dose with transplant pulmonary at [facility] - continue 3 mg q12h, check a trough level on Tuesday (right before he gets his dose) & he needs to follow up ASAP to determine if he should be on AZA 4) resume pta prednisone & stop stress dose steroids; 5) remove PICC if he does not need prolonged OPAT; 6) downgrade       Assessment & Plan      Goals of Care:  Life  "prolonging without limits      Neurology, Psychiatry, Sedation, Analgesia:  Alert & oriented     Analgesia   - scheduled APAP  - prn hydromorphone      Cardiovascular:  Hypovolemic-distributive shock - resolved  Possible contribution from relative adrenal insufficiency in the setting of chronic steroid use. Warm extremities. Not edematous. 8/29/24 limited cardiac POCUS grossly preserved biventricular function, left ventricular \"kissing\" sign, could not visualize IVC  - stop hydrocortisone     CAD requiring PCI  Severe PAD s/p R-BKA  - ASA, clopidogrel, statin     Hypertension   - hold pta carvedilol  - hold pta amlodipine     Respiratory, Airway:  Acute hypoxemic respiratory failure  Nosocomial aspiration pneumonia/pneumonitis   Observed aspiration intra-operatively under local anesthesia & sedation. 8/19/24 radiograph demonstrated patchy right mid-lung opacities.   - antimicrobials & infectious evaluation, as below   - pulmonary hygiene: acapella QID, IS Q2H while awake, out of bed to chair, PT/OT as able     A1AT s/p bilateral lung transplant 2013  Chronic lung allograft dysfunction, bronchiolitis obliterans syndrome  No evidence of exacerbation of obstructive physiology or increased work of breathing   - discussed with , transplant pulmonary at [facility]  - continue pta prednisone 5 mg qam & 2.5 mg qpm while on stress dose hydrocortisone  - continue pta fludrocortisone 0.1 mg every day   - pta tacrolimus from 3 mg q12h, collect trough level on Tues 9/3/24  - pta fluticasone-vilaterol every day   - stop duonebs q4h while awake     Gastrointestinal:  Nomral hepatobiliary indices     GERD  - pta PPI     Esophageal stricture   Reportedly underwent dilation in the past. Chronic difficulty swallowing solids. Reports that his symptoms have not recently changed   - consider GI consultation, barium esophogram     Renal, Acid-base, Electrolytes, Volume:  Non-oliguric SHELLEY on CKD  - hold pta furosemide 20mg every day "   - IVF boluses, check tacro trough levels   - trend BMP & UO, strict I&O, daily weight    Infectious Disease:  Nosocomial aspiration pneumonitis/pneumonia   Witnessed aspiration event 8/28/24. 8/19/24 radiograph demonstrated patchy right mid-lung opacities.   - broad spectrum antimicrobials, as below, with GNR-Pseudomonas coverage  - negative MRSA nares, sputum culture, viral panel     Right lower extremity wound infection s/p below knee amputation   Cultures demonstrated Stenotrophomonas & Enterococcus   - ID was consulted   - received vancomycin (8/25 - 8/28)   - pip-tazo (8/25-  current)  - TMP SMX (8/26 - current)    Prophylaxis  - pta dapsone & acyclovir     Hematology, Oncology:  Leukocytosis secondary to physiology stress vs sepsis     Acute on chronic macrocytic anemia     History of HIT  + anti-PF4 antibodies & AUGUST in 2013  - generally, lifelong avoidance of heparin   - consider fondaparinux vs apixaban for VTE prophylaxis pending SHELLEY trajectory     Endocrine:  T2DM  - pta glargine from 18 units qam   - MDSSI     Checklist:  FEN: regular diet   VTE ppx: on hold, consider fondaparinux vs apixaban pending SHELLEY trajectory   GI ppx: pta PPI   Bowel regimen: senna & PEG scheduled   Lines/tube-size: LUE PICC 8/29, PIVs  Skin: R-BKA site clean & dry   PT/OT/SLP, early mobility: not consulted   Code Status: full       Physical Exam      Neurologic: Alert & oriented. =Opens eyes, tracks, & follows commands in all extremities symmetrically.   HEENT: Head and face normal. No nasal discharge. Oropharynx normal. Eyelids, conjunctiva, & sclera normal.   Neck: Neck appearance normal. No neck masses. Thyroid not enlarged.  Respiratory: Lungs clear bilaterally. No wheezes, crackles, or rhonchi.   Cardiovascular: Regular rate & rhythm  Normal S1 & S2. No murmurs, rubs. or gallops. No elevated JVP.    Gastrointestinal: Soft. Nontender.   Musculoskeletal: Skeletal configuration normal and muscle mass normal for age. Joint  appearance overall normal.  Skin, Hair, & Nails: Chronic pigmentation changes extremities. Hair & nails normal.  Extremities: No peripheral edema. R-below knee amputation.      All pertinent vital signs, ventilator settings, I&Os, laboratory, microbiology, ECGs, & imaging data has been personally reviewed. Total subsequent encounter time, excluding procedures, was 50 minutes     DAVID Zepeda MD  Critical Care Medicine

## 2024-08-30 NOTE — PROGRESS NOTES
[facility]    Infectious Disease Progress Note     08/30/2024         Assessment & Plan   Aubrey Duncan is a 69 year old male who was admitted on 8/25/2024.     ASSESSMENT:  Status post BKA 8/28/2024, acute respiratory failure, currently on 40% FiO2 high flow nasal cannula  Aspiration pneumonia/pneumonitis- Hypoxia, chest x-ray: Heterogeneous opacities are present within the medial lower lobes including some angular components consistent with atelectasis. An underlying airspace inflammatory process could also be present. Diaphragmatic curvature is  preserved  Vomiting  Wound dehiscence, infection, stenotrophomonas, Enterococcus  Hx of bilateral lung transplant for COPD secondary to alpha 1 antitrypsin deficiency on 9/8/2012  Hx of CMV Viremia after CMV serodiscordant transplant.  S/P BKA on 8/28  Comorbid conditions: Immunosuppression, peripheral vascular disease, chronic kidney disease, type 2 diabetes    Susceptibility data from last 90 days.  Collected Specimen Info Organism Ampicillin Gentamicin Synergy Levofloxacin Minocycline Trimethoprim/Sulfamethoxazole  Vancomycin   08/25/24 Wound from Leg, Below Knee, Right Stenotrophomonas maltophilia    S  S  S      Enterococcus faecalis  S  S     S     Normal jcarlos         '    RECOMMENDATIONS:    CT chest with history of lung transplant, new hypoxia- seen in ICU  MRSA screen negative, appreciate input from ICU staff.  Antibiotics-trimethoprim/sulfamethoxazole, dosing discussed with pharmacy, continue Zosyn  Stopped vancomycin on 8/28/2024   Follow culture results   Focus/de-escalate antibiotics based on final culture results  Monitor CBC, CMP  Prophylaxis-dapsone, azithromycin-prior to admission  Pulmonary team, discussed with Dr. Reaves -transplant pulmonary at [facility].  ID will follow  Discussed with patient and patient's nurse      Patricia Palacios MD  [facility]  [phone]      Interval History    Chart reviewed  On high flow nasal cannula-updated patient- previously. Improved on 8/30/2024         Physical Exam   Temp: 97.9  F (36.6  C) Temp src: Oral BP: 122/58 Pulse: 96   Resp: 26 SpO2: 94 % O2 Device: Nasal cannula Oxygen Delivery: 3 LPM  Vitals:    08/25/24 1554 08/27/24 0749   Weight: 64.9 kg (143 lb) 67.7 kg (149 lb 4 oz)     Vital Signs with Ranges  Temp:  [97.9  F (36.6  C)-98.5  F (36.9  C)] 97.9  F (36.6  C)  Pulse:  [] 96  Resp:  [10-52] 26  BP: ()/(48-71) 122/58  FiO2 (%):  [35 %-44 %] 35 %  SpO2:  [92 %-97 %] 94 %    Constitutional: Awake, no apparent distress  Lungs: High flow nasal cannula, coarse--> on NC currently  Cardiovascular: Regular rate and rhythm, S1 S2  Abdomen: non distended  Skin: Dressing on lower extremity-post op dressing  Neuro: deconditioned  Psych: able to answer questions      Photo prior to BKA      Medications   Current Facility-Administered Medications   Medication Dose Route Frequency Provider Last Rate Last Admin    lactated ringers infusion   Intravenous Continuous Mario Steele MD        norepinephrine (LEVOPHED) 4 mg in  mL infusion PREMIX  0.01-0.6 mcg/kg/min Intravenous Continuous Yakelin Wallace, DO   Stopped at 08/29/24 2139     Current Facility-Administered Medications   Medication Dose Route Frequency Provider Last Rate Last Admin    acetaminophen (TYLENOL) tablet 975 mg  975 mg Oral Q8H Mario Steele MD   975 mg at 08/30/24 0440    acyclovir (ZOVIRAX) tablet 400 mg  400 mg Oral BID Mario Steele MD   400 mg at 08/30/24 0832    [Held by provider] amLODIPine (NORVASC) tablet 10 mg  10 mg Oral Daily Mario Steele MD   10 mg at 08/27/24 0917    aspirin EC tablet 81 mg  81 mg Oral Daily Mario Steele MD   81 mg at 08/30/24 0831    calcium carbonate-vitamin D (OSCAL) 500-5 MG-MCG per tablet 1 tablet  1 tablet Oral BID w/meals Mario Steele MD   1 tablet at 08/30/24 0830    [Held by provider]  carvedilol (COREG) tablet 6.25 mg  6.25 mg Oral BID w/meals Mario Steele MD   6.25 mg at 08/28/24 0913    ceFAZolin Sodium (ANCEF) injection 2 g  2 g Intravenous Pre-Op/Pre-procedure x 1 dose Mario Steele MD        ceFAZolin Sodium (ANCEF) injection 2 g  2 g Intravenous See Admin Instructions Mario Steele MD        clopidogrel (PLAVIX) tablet 75 mg  75 mg Oral Daily Mario Steele MD   75 mg at 08/30/24 0830    dapsone (ACZONE) tablet 50 mg  50 mg Oral Daily Mario Steele MD   50 mg at 08/30/24 0831    fludrocortisone (FLORINEF) tablet 0.1 mg  0.1 mg Oral Daily Mario Steele MD   0.1 mg at 08/30/24 0831    fluticasone-vilanterol (BREO ELLIPTA) 100-25 MCG/ACT inhaler 1 puff  1 puff Inhalation Daily Mario Steele MD   1 puff at 08/30/24 0845    [Held by provider] furosemide (LASIX) tablet 20 mg  20 mg Oral Daily Mario Steele MD   20 mg at 08/28/24 0913    insulin aspart (NovoLOG) injection (RAPID ACTING)  1-7 Units Subcutaneous TID AC Mario Steele MD   2 Units at 08/29/24 1622    insulin aspart (NovoLOG) injection (RAPID ACTING)  1-5 Units Subcutaneous At Bedtime Mario Steele MD   1 Units at 08/29/24 2102    insulin glargine (LANTUS PEN) injection 18 Units  18 Units Subcutaneous QAM AC Vladislav Zepeda MD   18 Units at 08/30/24 0845    ipratropium - albuterol 0.5 mg/2.5 mg/3 mL (DUONEB) neb solution 3 mL  3 mL Nebulization Q4H Yakelin Cat DO   3 mL at 08/30/24 0801    [Held by provider] lisinopril (ZESTRIL) tablet 40 mg  40 mg Oral Daily Kenneth Sales DO   40 mg at 08/27/24 0918    magnesium gluconate (MAGONATE) tablet 500 mg  500 mg Oral At Bedtime Fabián Walker,         multivitamin, therapeutic (THERA-VIT) tablet 1 tablet  1 tablet Oral Daily Mario Steele MD   1 tablet at 08/30/24 0830    pantoprazole (PROTONIX) EC tablet 40 mg  40 mg Oral Daily Mario Steele MD   40 mg at 08/30/24 0830     piperacillin-tazobactam (ZOSYN) 3.375 g vial to attach to  mL bag  3.375 g Intravenous Q8H DoMario martin MD   3.375 g at 08/30/24 0547    predniSONE (DELTASONE) tablet 2.5 mg  2.5 mg Oral QPM Vladislav Zepeda MD   2.5 mg at 08/28/24 2225    predniSONE (DELTASONE) tablet 5 mg  5 mg Oral Vladislav Kay MD   5 mg at 08/30/24 0846    rosuvastatin (CRESTOR) tablet 20 mg  20 mg Oral Q Mon Wed Fri AM Mario Steele MD   20 mg at 08/30/24 0830    sodium chloride (PF) 0.9% PF flush 3 mL  3 mL Intracatheter Q8H Mario Steele MD   3 mL at 08/30/24 0705    sodium chloride (PF) 0.9% PF flush 3 mL  3 mL Intracatheter Q8H Mario Steele MD   3 mL at 08/30/24 0440    sodium chloride (PF) 0.9% PF flush 3 mL  3 mL Intracatheter Q8H Mario Steele MD   3 mL at 08/30/24 1050    sulfamethoxazole-trimethoprim (BACTRIM DS) 800-160 MG per tablet 1 tablet  1 tablet Oral Daily Patricia Palacios MD        [START ON 8/31/2024] tacrolimus (GENERIC EQUIVALENT) capsule 3 mg  3 mg Oral Vladislav Kay MD        And    tacrolimus (GENERIC EQUIVALENT) capsule 3 mg  3 mg Oral QPM Vladislav Zepeda MD        wound support modular (EXPEDITE) bottle 60 mL  60 mL Oral Daily DoMario martin MD   60 mL at 08/29/24 0828       Data   All microbiology laboratory data reviewed.  Recent Labs   Lab Test 08/30/24  0515 08/29/24  0400 08/28/24  1832   WBC 10.1 11.3* 3.3*   HGB 5.5* 8.0* 8.5*   HCT 17.0* 25.3* 27.9*   MCV 99 101* 105*    398 267     Recent Labs   Lab Test 08/30/24  0444 08/30/24  0010 08/29/24  1813   CR 1.87* 2.01* 2.20*     Recent Labs   Lab Test 08/25/24  1632   SED 52*     Recent Labs   Lab Test 07/06/21  1101 11/27/20  1430 10/16/20  0939 09/11/20  0818 07/16/20  0856 07/02/20  1042 06/30/20  1056 02/27/19  0737 02/25/19  1814   CULT Heavy growth  Klebsiella pneumoniae  *  Heavy growth  Pseudomonas aeruginosa  * Moderate growth  Pseudomonas aeruginosa  * Heavy growth  Pseudomonas  aeruginosa  Some antibiotics have been suppressed due to the known inducible beta lactamase activity.   Please contact Microbiology for more information.  * Heavy growth  Escherichia coli  *  Heavy growth  Enterococcus faecalis  * Moderate growth  Stenotrophomonas maltophilia  * No growth after 6 days  No growth after 6 days Heavy growth  Klebsiella pneumoniae  * Canceled, Test credited  >10 Squamous epithelial cells/low power field indicates oral contamination. Please   recollect.  *  Notification of test cancellation was given to   SOHA VALLE RN @0951 2/27/19. CT   No growth       MICROBIOLOGY:    Reviewed    7-Day Micro Results       Collected Updated Procedure Result Status      08/29/2024 1959 08/30/2024 0541 Respiratory Panel PCR [[id number]]    Swab from Nasopharyngeal    Final result Component Value   Adenovirus Not Detected   Coronavirus Not Detected   This test detects Coronavirus 229E, HKU1, NL63 and OC43 but does not distinguish between them. It does not detect MERS ( Respiratory Syndrome), SARS (Severe Acute Respiratory Syndrome) or 2019-nCoV (Novel 2019) Coronavirus.   Human Metapneumovirus Not Detected   Human Rhin/Enterovirus Not Detected   Influenza A Not Detected   Influenza A, H1 Not Detected   Influenza A 2009 H1N1 Not Detected   Influenza A, H3 Not Detected   Influenza B Not Detected   Parainfluenza Virus 1 Not Detected   Parainfluenza Virus 2 Not Detected   Parainfluenza Virus 3 Not Detected   Parainfluenza Virus 4 Not Detected   Respiratory Syncytial Virus A Not Detected   Respiratory Syncytial Virus B Not Detected   Chlamydia Pneumoniae Not Detected   Mycoplasma Pneumoniae Not Detected            08/29/2024 1640 08/29/2024 2005 Respiratory Aerobic Bacterial Culture [[id number]]   Sputum from Expectorate    Final result Component Value   Culture >10 Squamous epithelial cells/low power field indicates oral contamination. Please recollect.   Gram Stain Result >10  Squamous epithelial cells/low power field    <25 PMNs/low power field    2+ Mixed jcarlos               08/29/2024 0846 08/29/2024 1428 MRSA MSSA PCR, Nasal Swab [[id number]]    Swab from Nare, Right    Final result Component Value   MRSA Target DNA Negative   SA Target DNA Negative            08/25/2024 1702 08/28/2024 1425 Wound Aerobic Bacterial Culture Routine With Gram Stain [[id number]]    (Abnormal)   Wound from Leg, Below Knee, Right    Final result Component Value   Culture 4+ Stenotrophomonas maltophilia    4+ Enterococcus faecalis    4+ Normal jcarlos   Gram Stain Result 4+ Gram negative bacilli    4+ Gram positive cocci    2+ WBC seen        Susceptibility        Stenotrophomonas maltophilia      DELTA      Ceftazidime 4 ug/mL Susceptible  [*]       Ceftazidime Avibactam 4 ug/mL No interpretation available  [*]       Ceftolozane/Tazobactam 8 ug/mL No interpretation available  [*]       Levofloxacin 2 ug/mL Susceptible      Minocycline <=1 ug/mL Susceptible      Trimethoprim/Sulfamethoxazole <=2/38 ug/mL Susceptible                   [*]  Suppressed Antibiotic               Susceptibility        Enterococcus faecalis      DELTA      Ampicillin <=2 ug/mL Susceptible      Ciprofloxacin <=0.5 ug/mL Susceptible  [*]       Doxycycline <=0.5 ug/mL Susceptible  [*]       Gentamicin Synergy Susceptible ug/mL Susceptible  [1]       Levofloxacin 1 ug/mL Susceptible  [*]       Linezolid 2 ug/mL Susceptible  [*]       Nitrofurantoin <=16 ug/mL Susceptible  [*]       Streptomycin Synergy Susceptible ug/mL Susceptible  [*]       Tigecycline <=0.12 ug/mL Susceptible  [*]       Vancomycin 1 ug/mL Susceptible                   [*]  Suppressed Antibiotic     [1]  No high level gentamicin resistance found - therefore combination therapy with an aminoglycoside may be indicated for serious enterococcal infections such as bacteremia and endocarditis.               Susceptibility Comments       Stenotrophomonas maltophilia     "Antibiotics listed as \"No Interpretation\" have no regulatory guidelines for susceptibility/resistance available.               08/25/2024 1643 08/29/2024 1816 Blood Culture Arm, Right [[id number]]   Blood from Arm, Right    Preliminary result Component Value   Culture No growth after 4 days  [P]                         RADIOLOGY:    Reviewed  POC US Guidance Needle Placement    Result Date: 8/1/2024  Ultrasound was performed as guidance to an anesthesia procedure.  Click \"PACS images\" hyperlink below to view any stored images.  For specific procedure details, view procedure note authored by anesthesia.           "

## 2024-08-30 NOTE — PROGRESS NOTES
[facility]    Medicine Progress Note - Hospitalist Service    Date of Admission:  8/25/2024    Assessment & Plan   69-year-old male h/o lung transplant 2013, PAD, chronic limb threatening ischemia, COPD, DM2 and CKD admitted for RLE wound infection now s/p R BKA.  Postoperative course complicated by hypotension requiring vasopressors, respiratory failure and anemia requiring PRBC.    #RLE wound infection  #Peripheral arterial disease  WCx: Stenotrophomonas, Enterococcus faecalis preliminarily  ID consulted, continuing Zosyn, s/p IV Vanc  PPx: Bactrim, acyclovir, dapsone  S/p R BKA 8/28 Dr. Jay, vascular surgery  ASA, Plavix, statin    #Hypovolemic, distributive shock  Postoperatively, adrenal insufficiency with chronic steroid use  Intensivist following  S/p 1L LR, albumin  S/p vasopressors weaned off last night  S/p IV hydrocortisone  Florinef, Prednisone    #Acute blood loss anemia  Hgb 5.5  Using 1 unit PRBC    #Acute respiratory failure with hypoxia  Observed aspiration intraoperatively 8/19  HFNC wean as tolerated  ABX as above  DuoNebs, home inhaler    #Bilateral lung transplant 2013  Resumed home prednisone, Florinef  Tacrolimus    #SHELLEY on CKD  #Lactic acidosis, resolved  LR at 75  Monitor    #History of diabetes type 2  #Hyperglycemia due to chronic steroids  A1c 4.2  Lantus, titrate as necessary to avoid hypoglycemia    #CAD  #Hypertension  Home beta-blocker, Lasix and amlodipine on hold due to hypotension    #Severe malnutrition  RD consulted and added Glucerna  Magnesium supplementation switch from Mag-Ox to Mag-gluconate due to diarrhea  PRN imodium      DVT ppx: PCDs          Diet: Snacks/Supplements Adult: Gelatein Plus; Between Meals  Snacks/Supplements Adult: Glucerna; Between Meals  Moderate Consistent Carb (60 g CHO per Meal) Diet    Naranjo Catheter: Not present  Lines: PRESENT      PICC 08/29/24 Triple Lumen Left Brachial vein medial Vasopressor,Access-Site Assessment:  WDL      Cardiac Monitoring: ACTIVE order. Indication: ICU  Code Status: Full Code      Clinically Significant Risk Factors              # Hypoalbuminemia: Lowest albumin = 2.8 g/dL at 8/27/2024  5:22 AM, will monitor as appropriate    # Acute Kidney Injury, unspecified: based on a >150% or 0.3 mg/dL increase in last creatinine compared to past 90 day average, will monitor renal function  # Hypertension: Noted on problem list            # Severe Malnutrition: based on nutrition assessment    # Financial/Environmental Concerns: none               Disposition Plan     Medically Ready for Discharge: Anticipated in 2-4 Days             Fabián Walker DO  Hospitalist Service  [facility]  Securely message with [employer] (more info)  Text page via [email or web address] Paging/Directory   ______________________________________________________________________    Interval History   Reports diarrhea last night, none this AM after imodium given    Physical Exam   Vital Signs: Temp: 98.1  F (36.7  C) Temp src: Oral BP: (!) 148/67 Pulse: 93   Resp: 27 SpO2: 97 % O2 Device: High Flow Nasal Cannula (HFNC) Oxygen Delivery: 30 LPM  Weight: 149 lbs 4.02 oz    General Appearance:  No acute distress  Respiratory: Clear to auscultation bilaterally  Cardiovascular: Regular rate and rhythm  Extremities: R BKA dressing clean dry and intact, no swelling or cyanosis to the LLE  Neuro: Alert and oriented x 3, normal speech    Medical Decision Making       55 MINUTES SPENT BY ME on the date of service doing chart review, history, exam, documentation & further activities per the note.      Data

## 2024-08-30 NOTE — PLAN OF CARE
"Goal Outcome Evaluation:    [facility] - ICU    RN Progress Note:            Pertinent Assessments:      Please refer to flowsheet rows for full assessment     Stable VS, Off pressors BP stable, controlled pain by regular pain medication.   Patient has 4 times diarrhea and Imodium Prn given to the patient.             Key Events - This Shift:       Patient passed Urine once 500 ml.  Hgb level at 0515  was 5.5 and rechecked.  Informed Dr. Plata and to transfuse one unit PRBC's                Barriers to Discharge / Downgrade:     Low Hgb, on HFNC,                  Plan of Care Reviewed With: patient    Overall Patient Progress: improvingOverall Patient Progress: improving    Outcome Evaluation: Patient now Off Pressors, tolerating HFNC, Stable VS,   Hgb level was low and to order obtaine dfor blood transfusion      Problem: Adult Inpatient Plan of Care  Goal: Plan of Care Review  Description: The Plan of Care Review/Shift note should be completed every shift.  The Outcome Evaluation is a brief statement about your assessment that the patient is improving, declining, or no change.  This information will be displayed automatically on your shift  note.  Outcome: Progressing  Flowsheets (Taken 8/30/2024 0752)  Outcome Evaluation: Patient now Off Pressors, tolerating HFNC, Stable VS,   Hgb level was low and to order obtaine dfor blood transfusion  Plan of Care Reviewed With: patient  Overall Patient Progress: improving  Goal: Patient-Specific Goal (Individualized)  Description: You can add care plan individualizations to a care plan. Examples of Individualization might be:  \"Parent requests to be called daily at 9am for status\", \"I have a hard time hearing out of my right ear\", or \"Do not touch me to wake me up as it startles  me\".  Outcome: Progressing  Flowsheets (Taken 8/30/2024 0000)  Anxieties, Fears or Concerns: None  Goal: Absence of Hospital-Acquired Illness or Injury  Outcome: " Progressing  Intervention: Identify and Manage Fall Risk  Recent Flowsheet Documentation  Taken 8/30/2024 0400 by Carly Garcia RN  Safety Promotion/Fall Prevention:   activity supervised   assistive device/personal items within reach  Taken 8/30/2024 0000 by Carly Garcia RN  Safety Promotion/Fall Prevention:   activity supervised   assistive device/personal items within reach  Intervention: Prevent Skin Injury  Recent Flowsheet Documentation  Taken 8/30/2024 0600 by Carly Garcia RN  Body Position: position changed independently  Taken 8/30/2024 0400 by Carly Garcia RN  Body Position: position changed independently  Taken 8/30/2024 0200 by Carly Garcia RN  Body Position: position changed independently  Taken 8/30/2024 0000 by Carly Garcia RN  Body Position: position changed independently  Intervention: Prevent Infection  Recent Flowsheet Documentation  Taken 8/30/2024 0400 by Carly Garcia RN  Infection Prevention: hand hygiene promoted  Taken 8/30/2024 0000 by Carly Garcia RN  Infection Prevention: hand hygiene promoted  Goal: Optimal Comfort and Wellbeing  Outcome: Progressing  Intervention: Provide Person-Centered Care  Recent Flowsheet Documentation  Taken 8/30/2024 0400 by Carly Garcia RN  Trust Relationship/Rapport: care explained  Taken 8/30/2024 0000 by Carly Garcia RN  Trust Relationship/Rapport: care explained  Goal: Readiness for Transition of Care  Outcome: Progressing

## 2024-08-30 NOTE — PLAN OF CARE
Goal Outcome Evaluation:    [facility] - ICU    RN Progress Note:            Pertinent Assessments:      Please refer to flowsheet rows for full assessment     VSS. Off pressors. Maintaining O2 sats on 3L NC. Pain managed with Tylenol.              Key Events - This Shift:       1 unit PRBC completed, a second unit started.   Diarrhea x1 and given PRN imodium.   PT/OT consulted  CT completed.               Barriers to Discharge / Downgrade:     Low Hgb. Transferred to P4.

## 2024-08-30 NOTE — PROGRESS NOTES
"VASCULAR SURGERY PROGRESS NOTE    Subjective:  Patient was seen and evaluated at the bedside for surgical follow up. Mild pain to residual limb, tolerates dressing change well. Getting 1 unit PRBCs this AM for acute blood loss anemia. Remains on HFNC. Weaned off pressors. No other concerns.     Objective:  Intake/Output Summary (Last 24 hours) at 8/30/2024 1037  Last data filed at 8/30/2024 0800  Gross per 24 hour   Intake 1896.84 ml   Output 1475 ml   Net 421.84 ml     PHYSICAL EXAM:  /58   Pulse 103   Temp 98  F (36.7  C)   Resp 29   Ht 1.727 m (5' 8\")   Wt 67.7 kg (149 lb 4 oz)   SpO2 96%   BMI 22.69 kg/m    General: The patient is alert and oriented. Appropriate. No acute distress  Psych: Pleasant affect, answers questions appropriately  Skin: Color appropriate for race, warm, dry.  Respiratory: Normal respiratory effort on HFNC  Extremities: BKA incision clean and intact with staples, minimal bloody drainage from lateral aspect of incision      Imaging:   Pertinent imaging reviewed    ASSESSMENT:  69-year-old gentleman with bilateral foot wounds with advanced infrapopliteal disease that has not been amenable to either open or endovascular revascularization. Now POD#2 right below knee amputation.      Hypoxemic respiratory failure post operatively from likely aspiration event, hypotension requiring pressor support, and acute blood loss anemia.     PLAN:  Daily and PRN dressing changes to BKA with dry gauze, ABD, kerlix, and ACE  Encourage leg elevation and use of limb protector  Encourage incentive spirometry and pulmonary hygiene, wean oxygen as tolerated  Antibiotics per ID recommendations  Follow hemoglobin  Pain control PRN  Appreciate hospitalist input  Social work/case management following for discharge planning     Discussed pt history, exam, assessment and plan with Dr. Jay of the vascular surgery service, who is in agreement with the above.    Svetlana Escobedo PA-C  VASCULAR SURGERY "

## 2024-08-30 NOTE — PROGRESS NOTES
"CLINICAL NUTRITION SERVICES - BRIEF NOTE    EVALUATION OF THE PROGRESS TOWARD GOALS   Diet: Moderate Consistent Carbohydrate  OK for double protein portions at meals   Nutrition Supplement: Glucerna BID, Gel Plus daily, expedite bottle   Intake: Poor    Pt taking nutrition supplements   Pt consuming a few bites of food at meals, 25-50% 8/27-29   Pt meeting 60-85% estimated nutrition needs prior to surgery      NEW FINDINGS   Met with pt at bedside this AM. Pt reports taking nutrition supplements as able. Pt notes taking 100% x1 Glucerna yesterday and stated \"it went right through me.\" MD adjusting magnesium replacements + PRN imodium with loose stools/diarrhea. Pt reports ongoing difficulty swallowing solids with hx esophageal dilation.     R-BKA   I/Os: -3.5 L this admit   BM: x4 this AM   Height: 172.7 cm (5' 8\")  Most Recent Weight: 67.7 kg (149 lb 4 oz)    Weight History: Current weight down, trending up from admit      Dosing Weight: 64.9 kg     ASSESSED NUTRITION NEEDS  Estimated Energy Needs: 1947+ kcals/day (30+ Hugo/Kg)  Justification: Repletion  Estimated Protein Needs: 78-97 grams protein/day (1.2 - 1.5 grams of pro/kg)  Justification: Wound healing  Estimated Fluid Needs: 1947 mL/day (30 ml/Kg)   Justification: Maintenance    MALNUTRITION  Malnutrition Diagnosis: Severe malnutrition  In Context of:  Chronic illness or disease    INTERVENTIONS  Implementation  Medical food supplement therapy- Continue     Consider Consult Speech therapy for evaluation of swallow, pt reports hx of difficulty swallowing.     Goals  Total avg nutritional intake to meet a minimum of 30 kcal/kg and 1.2+ g PRO/kg daily (per dosing wt 64.9 kg).- Not Met   Prevent further weight loss- Progressing  Wound healing- Progressing     Monitoring/Evaluation  Progress toward goals will be monitored and evaluated per protocol.   "

## 2024-08-31 ENCOUNTER — APPOINTMENT (OUTPATIENT)
Dept: OCCUPATIONAL THERAPY | Facility: HOSPITAL | Age: 69
DRG: 856 | End: 2024-08-31
Attending: HOSPITALIST
Payer: MEDICARE

## 2024-08-31 ENCOUNTER — APPOINTMENT (OUTPATIENT)
Dept: PHYSICAL THERAPY | Facility: HOSPITAL | Age: 69
DRG: 856 | End: 2024-08-31
Attending: HOSPITALIST
Payer: MEDICARE

## 2024-08-31 LAB
ANION GAP SERPL CALCULATED.3IONS-SCNC: 12 MMOL/L (ref 7–15)
BASOPHILS # BLD MANUAL: 0 10E3/UL (ref 0–0.2)
BASOPHILS NFR BLD MANUAL: 0 %
BUN SERPL-MCNC: 44.2 MG/DL (ref 8–23)
CALCIUM SERPL-MCNC: 8 MG/DL (ref 8.8–10.4)
CHLORIDE SERPL-SCNC: 107 MMOL/L (ref 98–107)
CREAT SERPL-MCNC: 1.11 MG/DL (ref 0.67–1.17)
EGFRCR SERPLBLD CKD-EPI 2021: 71 ML/MIN/1.73M2
EOSINOPHIL # BLD MANUAL: 0.4 10E3/UL (ref 0–0.7)
EOSINOPHIL NFR BLD MANUAL: 4 %
ERYTHROCYTE [DISTWIDTH] IN BLOOD BY AUTOMATED COUNT: 17 % (ref 10–15)
GLUCOSE BLDC GLUCOMTR-MCNC: 103 MG/DL (ref 70–99)
GLUCOSE BLDC GLUCOMTR-MCNC: 105 MG/DL (ref 70–99)
GLUCOSE BLDC GLUCOMTR-MCNC: 107 MG/DL (ref 70–99)
GLUCOSE BLDC GLUCOMTR-MCNC: 72 MG/DL (ref 70–99)
GLUCOSE BLDC GLUCOMTR-MCNC: 86 MG/DL (ref 70–99)
GLUCOSE SERPL-MCNC: 80 MG/DL (ref 70–99)
HCO3 SERPL-SCNC: 24 MMOL/L (ref 22–29)
HCT VFR BLD AUTO: 31.1 % (ref 40–53)
HGB BLD-MCNC: 10.1 G/DL (ref 13.3–17.7)
LYMPHOCYTES # BLD MANUAL: 2.6 10E3/UL (ref 0.8–5.3)
LYMPHOCYTES NFR BLD MANUAL: 26 %
MCH RBC QN AUTO: 30.7 PG (ref 26.5–33)
MCHC RBC AUTO-ENTMCNC: 32.5 G/DL (ref 31.5–36.5)
MCV RBC AUTO: 95 FL (ref 78–100)
MONOCYTES # BLD MANUAL: 0.9 10E3/UL (ref 0–1.3)
MONOCYTES NFR BLD MANUAL: 9 %
NEUTROPHILS # BLD MANUAL: 6 10E3/UL (ref 1.6–8.3)
NEUTROPHILS NFR BLD MANUAL: 61 %
NRBC # BLD AUTO: 0 10E3/UL
NRBC # BLD AUTO: 0.1 10E3/UL
NRBC BLD AUTO-RTO: 0 /100
NRBC BLD MANUAL-RTO: 1 %
PLAT MORPH BLD: ABNORMAL
PLATELET # BLD AUTO: 223 10E3/UL (ref 150–450)
POLYCHROMASIA BLD QL SMEAR: SLIGHT
POTASSIUM SERPL-SCNC: 3.9 MMOL/L (ref 3.4–5.3)
RBC # BLD AUTO: 3.29 10E6/UL (ref 4.4–5.9)
RBC MORPH BLD: ABNORMAL
SODIUM SERPL-SCNC: 143 MMOL/L (ref 135–145)
TOXIC GRANULES BLD QL SMEAR: PRESENT
WBC # BLD AUTO: 9.9 10E3/UL (ref 4–11)

## 2024-08-31 PROCEDURE — 250N000012 HC RX MED GY IP 250 OP 636 PS 637: Performed by: STUDENT IN AN ORGANIZED HEALTH CARE EDUCATION/TRAINING PROGRAM

## 2024-08-31 PROCEDURE — 85014 HEMATOCRIT: CPT | Performed by: HOSPITALIST

## 2024-08-31 PROCEDURE — 250N000013 HC RX MED GY IP 250 OP 250 PS 637: Performed by: STUDENT IN AN ORGANIZED HEALTH CARE EDUCATION/TRAINING PROGRAM

## 2024-08-31 PROCEDURE — 99232 SBSQ HOSP IP/OBS MODERATE 35: CPT | Performed by: HOSPITALIST

## 2024-08-31 PROCEDURE — 97166 OT EVAL MOD COMPLEX 45 MIN: CPT | Mod: GO

## 2024-08-31 PROCEDURE — 250N000011 HC RX IP 250 OP 636: Performed by: STUDENT IN AN ORGANIZED HEALTH CARE EDUCATION/TRAINING PROGRAM

## 2024-08-31 PROCEDURE — 250N000011 HC RX IP 250 OP 636: Performed by: INTERNAL MEDICINE

## 2024-08-31 PROCEDURE — 85007 BL SMEAR W/DIFF WBC COUNT: CPT | Performed by: HOSPITALIST

## 2024-08-31 PROCEDURE — 80048 BASIC METABOLIC PNL TOTAL CA: CPT | Performed by: HOSPITALIST

## 2024-08-31 PROCEDURE — 250N000013 HC RX MED GY IP 250 OP 250 PS 637: Performed by: INTERNAL MEDICINE

## 2024-08-31 PROCEDURE — 97535 SELF CARE MNGMENT TRAINING: CPT | Mod: GO

## 2024-08-31 PROCEDURE — 97110 THERAPEUTIC EXERCISES: CPT | Mod: GP

## 2024-08-31 PROCEDURE — 97162 PT EVAL MOD COMPLEX 30 MIN: CPT | Mod: GP

## 2024-08-31 PROCEDURE — 99232 SBSQ HOSP IP/OBS MODERATE 35: CPT | Performed by: INTERNAL MEDICINE

## 2024-08-31 PROCEDURE — 120N000001 HC R&B MED SURG/OB

## 2024-08-31 PROCEDURE — 250N000013 HC RX MED GY IP 250 OP 250 PS 637: Performed by: HOSPITALIST

## 2024-08-31 PROCEDURE — 97110 THERAPEUTIC EXERCISES: CPT | Mod: GO

## 2024-08-31 RX ORDER — SULFAMETHOXAZOLE/TRIMETHOPRIM 800-160 MG
1 TABLET ORAL 2 TIMES DAILY
Status: COMPLETED | OUTPATIENT
Start: 2024-08-31 | End: 2024-09-04

## 2024-08-31 RX ORDER — SULFAMETHOXAZOLE/TRIMETHOPRIM 800-160 MG
1 TABLET ORAL DAILY
Status: DISCONTINUED | OUTPATIENT
Start: 2024-09-01 | End: 2024-08-31

## 2024-08-31 RX ORDER — AMLODIPINE BESYLATE 5 MG/1
5 TABLET ORAL AT BEDTIME
Status: DISCONTINUED | OUTPATIENT
Start: 2024-08-31 | End: 2024-08-31

## 2024-08-31 RX ORDER — AMLODIPINE BESYLATE 5 MG/1
5 TABLET ORAL AT BEDTIME
Status: DISCONTINUED | OUTPATIENT
Start: 2024-08-31 | End: 2024-09-01

## 2024-08-31 RX ORDER — AZITHROMYCIN 250 MG/1
250 TABLET, FILM COATED ORAL
Status: DISCONTINUED | OUTPATIENT
Start: 2024-09-02 | End: 2024-09-04 | Stop reason: HOSPADM

## 2024-08-31 RX ADMIN — AMLODIPINE BESYLATE 5 MG: 5 TABLET ORAL at 20:21

## 2024-08-31 RX ADMIN — FLUTICASONE FUROATE AND VILANTEROL TRIFENATATE 1 PUFF: 100; 25 POWDER RESPIRATORY (INHALATION) at 08:22

## 2024-08-31 RX ADMIN — Medication 1 TABLET: at 08:19

## 2024-08-31 RX ADMIN — ACETAMINOPHEN 975 MG: 325 TABLET ORAL at 11:59

## 2024-08-31 RX ADMIN — THERA TABS 1 TABLET: TAB at 08:19

## 2024-08-31 RX ADMIN — SULFAMETHOXAZOLE AND TRIMETHOPRIM 1 TABLET: 800; 160 TABLET ORAL at 08:21

## 2024-08-31 RX ADMIN — ACETAMINOPHEN 975 MG: 325 TABLET ORAL at 06:21

## 2024-08-31 RX ADMIN — PREDNISONE 2.5 MG: 2.5 TABLET ORAL at 20:22

## 2024-08-31 RX ADMIN — FLUDROCORTISONE ACETATE 0.1 MG: 0.1 TABLET ORAL at 08:20

## 2024-08-31 RX ADMIN — CARVEDILOL 6.25 MG: 6.25 TABLET, FILM COATED ORAL at 17:42

## 2024-08-31 RX ADMIN — TACROLIMUS 3 MG: 1 CAPSULE ORAL at 20:21

## 2024-08-31 RX ADMIN — Medication 1 TABLET: at 17:42

## 2024-08-31 RX ADMIN — LOPERAMIDE HYDROCHLORIDE 2 MG: 2 CAPSULE ORAL at 13:49

## 2024-08-31 RX ADMIN — ACYCLOVIR 400 MG: 400 TABLET ORAL at 08:20

## 2024-08-31 RX ADMIN — SULFAMETHOXAZOLE AND TRIMETHOPRIM 1 TABLET: 800; 160 TABLET ORAL at 20:21

## 2024-08-31 RX ADMIN — PREDNISONE 5 MG: 5 TABLET ORAL at 08:19

## 2024-08-31 RX ADMIN — LOPERAMIDE HYDROCHLORIDE 2 MG: 2 CAPSULE ORAL at 20:20

## 2024-08-31 RX ADMIN — Medication 500 MG: at 20:21

## 2024-08-31 RX ADMIN — ASPIRIN 81 MG: 81 TABLET, COATED ORAL at 08:18

## 2024-08-31 RX ADMIN — LOPERAMIDE HYDROCHLORIDE 2 MG: 2 CAPSULE ORAL at 02:00

## 2024-08-31 RX ADMIN — PIPERACILLIN AND TAZOBACTAM 3.38 G: 3; .375 INJECTION, POWDER, FOR SOLUTION INTRAVENOUS at 22:04

## 2024-08-31 RX ADMIN — CARVEDILOL 6.25 MG: 6.25 TABLET, FILM COATED ORAL at 08:21

## 2024-08-31 RX ADMIN — HYDROMORPHONE HYDROCHLORIDE 4 MG: 4 TABLET ORAL at 10:54

## 2024-08-31 RX ADMIN — CLOPIDOGREL BISULFATE 75 MG: 75 TABLET ORAL at 08:19

## 2024-08-31 RX ADMIN — PIPERACILLIN AND TAZOBACTAM 3.38 G: 3; .375 INJECTION, POWDER, FOR SOLUTION INTRAVENOUS at 13:30

## 2024-08-31 RX ADMIN — DAPSONE 50 MG: 25 TABLET ORAL at 08:20

## 2024-08-31 RX ADMIN — HYDROMORPHONE HYDROCHLORIDE 4 MG: 4 TABLET ORAL at 02:00

## 2024-08-31 RX ADMIN — PIPERACILLIN AND TAZOBACTAM 3.38 G: 3; .375 INJECTION, POWDER, FOR SOLUTION INTRAVENOUS at 06:26

## 2024-08-31 RX ADMIN — ACYCLOVIR 400 MG: 400 TABLET ORAL at 20:22

## 2024-08-31 RX ADMIN — Medication 60 ML: at 10:08

## 2024-08-31 RX ADMIN — TACROLIMUS 3 MG: 1 CAPSULE ORAL at 08:20

## 2024-08-31 RX ADMIN — PANTOPRAZOLE SODIUM 40 MG: 20 TABLET, DELAYED RELEASE ORAL at 08:18

## 2024-08-31 ASSESSMENT — ACTIVITIES OF DAILY LIVING (ADL)
ADLS_ACUITY_SCORE: 51
ADLS_ACUITY_SCORE: 51
ADLS_ACUITY_SCORE: 47
ADLS_ACUITY_SCORE: 51
ADLS_ACUITY_SCORE: 47
ADLS_ACUITY_SCORE: 51
ADLS_ACUITY_SCORE: 51
ADLS_ACUITY_SCORE: 47
ADLS_ACUITY_SCORE: 51
ADLS_ACUITY_SCORE: 51
ADLS_ACUITY_SCORE: 47
ADLS_ACUITY_SCORE: 51
ADLS_ACUITY_SCORE: 47
ADLS_ACUITY_SCORE: 51
ADLS_ACUITY_SCORE: 51
ADLS_ACUITY_SCORE: 47

## 2024-08-31 NOTE — PROGRESS NOTES
08/31/24 1115   Appointment Info   Signing Clinician's Name / Credentials (OT) Sanaz Reyes,OTR/L   Living Environment   People in Home spouse   Current Living Arrangements house  (planning to move soon,)   Transportation Anticipated agency   Living Environment Comments pt and spouse planning to move to a 1 level townhouse, may stay at dghtrs home while selling home   Self-Care   Usual Activity Tolerance good   Current Activity Tolerance moderate   Equipment Currently Used at Home   (tub/shower,has access to extended tub bench, has cane,has electric scooter, regular height toilet, no AD used)   Fall history within last six months no   Activity/Exercise/Self-Care Comment pt IND ADLs, spouse and pt share household tasks,   General Information   Onset of Illness/Injury or Date of Surgery 08/25/24   Patient/Family Therapy Goal Statement (OT) get stronger   Additional Occupational Profile Info/Pertinent History of Current Problem 69-year-old male h/o lung transplant 2013, PAD, chronic limb threatening ischemia, COPD, DM2 and CKD admitted for RLE wound infection now s/p R BKA.  Postoperative course complicated by hypotension requiring vasopressors, respiratory failure and anemia requiring PRBC.   Existing Precautions/Restrictions fall;other (see comments)  (BKA stump protector)   Cognitive Status Examination   Orientation Status orientation to person, place and time   Visual Perception   Visual Impairment/Limitations WFL   Range of Motion Comprehensive   General Range of Motion no range of motion deficits identified   Strength Comprehensive (MMT)   General Manual Muscle Testing (MMT) Assessment   (decreased strength w/ trsfs/mobility)   Coordination   Upper Extremity Coordination No deficits were identified   Bed Mobility   Comment (Bed Mobility) NT   Transfers   Transfers bed-chair transfer;sit-stand transfer   Transfer Skill: Bed to Chair/Chair to Bed   Bed-Chair [place] (Transfers) minimum assist (75% patient  effort)   Sit-Stand Transfer   Sit-Stand [place] (Transfers) minimum assist (75% patient effort)   Balance   Balance Assessment standing dynamic balance   Balance Comments fair balance w/ FWW   Activities of Daily Living   BADL Assessment/Intervention lower body dressing   Lower Body Dressing Assessment/Training   Comment, (Lower Body Dressing) per clinical judgement will need A w/ drsg   Clinical Impression   Criteria for Skilled Therapeutic Interventions Met (OT) Yes, treatment indicated   OT Diagnosis decreased ADLs   OT Problem List-Impairments impacting ADL activity tolerance impaired;balance;mobility;strength;pain;post-surgical precautions   Assessment of Occupational Performance 3-5 Performance Deficits   Identified Performance Deficits trsfs,drsg, standing ADL tasks   Planned Therapy Interventions (OT) ADL retraining;strengthening;transfer training;home program guidelines;progressive activity/exercise   Clinical Decision Making Complexity (OT) detailed assessment/moderate complexity   Risk & Benefits of therapy have been explained evaluation/treatment results reviewed;care plan/treatment goals reviewed;patient   OT Total Evaluation Time   OT Eval, Moderate Complexity Minutes (16984) 8   OT Goals   Therapy Frequency (OT) Daily   OT Predicted Duration/Target Date for Goal Attainment 09/07/24   OT Goals Lower Body Dressing;Transfers;OT Goal 1;OT Goal 2   OT: Lower Body Dressing Minimal assist   OT: Transfer with assistive device  (CGA)   OT: Goal 1 Pt will complete 8 minutes B UE ex to increase strength for ADLs   OT: Goal 2 Pt will demo standing for 1-2 minutes for ADL task to increase ADL IND   Interventions   Interventions Quick Adds Self-Care/Home Management;Therapeutic Procedures/Exercise   Self-Care/Home Management   Self-Care/Home Mgmt/ADL, Compensatory, Meal Prep Minutes (01179) 10   Symptoms Noted During/After Treatment (Meal Preparation/Planning Training) fatigue   Treatment Detail/Skilled  Intervention Evaluation completed. Treatment initiated. Pt trsf bed<>chair w/ FWW w/ min A w/ cues for hand placement and safety. Hopped on L foot during trsf. Height of bed elevated for ease w/ STS. Therapist demo add'l way to trsf w/ using alternating heel-toe method. Left in chair at end of session w/ call light in reach.   Lower Body Dressing Training Assistance other (see comments)  (gave suggestions for clothing to wear during rehab phase that allow for easier drsg, max A to attach strap for stump)   Therapeutic Procedures/Exercise   Therapeutic Procedure: strength, endurance, ROM, flexibillity minutes (68272) 8   Symptoms Noted During/After Treatment fatigue   Treatment Detail/Skilled Intervention Pt seated in chair. Instructed in B UE AROM ex w/ visual cues using 3# tbar, approx. 10 sets/10 w/ rest breaks.Gave general suggestions for increasing UE strength   OT Discharge Planning   OT Plan close trsfs, drsg, UE ex, standing gaurav   OT Discharge Recommendation (DC Rec) Acute Rehab Center-Motivated patient will benefit from intensive, interdisciplinary therapy.  Anticipate will be able to tolerate 3 hours of therapy per day   OT Rationale for DC Rec Ax1 for seated ADLs and close transfers, recommend continued  rehab to increase ADL IND, usually active, pt motivated   OT Brief overview of current status min A trsfs, maxA stump strap adjustment   Total Session Time   Timed Code Treatment Minutes 18   Total Session Time (sum of timed and untimed services) 26

## 2024-08-31 NOTE — PLAN OF CARE
Problem: Adult Inpatient Plan of Care  Goal: Plan of Care Review  Description: The Plan of Care Review/Shift note should be completed every shift.  The Outcome Evaluation is a brief statement about your assessment that the patient is improving, declining, or no change.  This information will be displayed automatically on your shift  note.  Outcome: Progressing     Problem: Risk for Delirium  Goal: Optimal Coping  Outcome: Progressing  Intervention: Optimize Psychosocial Adjustment to Delirium  Recent Flowsheet Documentation  Taken 8/30/2024 1624 by Maye Rodriguez RN  Supportive Measures: active listening utilized     Problem: Infection  Goal: Absence of Infection Signs and Symptoms  Outcome: Progressing     Problem: Pain Acute  Goal: Optimal Pain Control and Function  Outcome: Progressing  Intervention: Prevent or Manage Pain  Recent Flowsheet Documentation  Taken 8/30/2024 1624 by Maye Rodriguez RN  Sensory Stimulation Regulation: care clustered  Intervention: Optimize Psychosocial Wellbeing  Recent Flowsheet Documentation  Taken 8/30/2024 1624 by Maye Rodriguez RN  Supportive Measures: active listening utilized     Problem: Acute Kidney Injury/Impairment  Goal: Fluid and Electrolyte Balance  Outcome: Progressing     Problem: Hypertension Acute  Goal: Blood Pressure Within Desired Range  Outcome: Progressing  Intervention: Normalize Blood Pressure  Recent Flowsheet Documentation  Taken 8/30/2024 1624 by Maye Rodriguez RN  Sensory Stimulation Regulation: care clustered     Problem: COPD (Chronic Obstructive Pulmonary Disease)  Goal: Optimal Chronic Illness Coping  Outcome: Progressing  Intervention: Support and Optimize Psychosocial Response  Recent Flowsheet Documentation  Taken 8/30/2024 1624 by Maye Rodriguez RN  Supportive Measures: active listening utilized     Problem: Mobility Impairment  Goal: Optimal Mobility  Outcome: Progressing   Goal Outcome Evaluation:                  Pt. A&O. Had pain  this shift .Dilaudid and scheduled pain med given. On 3L O2.. Pt. Has PICC line with Zosyn running. Tele in place reading NSR. Had diarrhea x 2 and Imodium given. Continue with POC.

## 2024-08-31 NOTE — PLAN OF CARE
Problem: Adult Inpatient Plan of Care  Goal: Plan of Care Review  Description: The Plan of Care Review/Shift note should be completed every shift.  The Outcome Evaluation is a brief statement about your assessment that the patient is improving, declining, or no change.  This information will be displayed automatically on your shift  note.  Outcome: Progressing     Problem: Adult Inpatient Plan of Care  Goal: Readiness for Transition of Care  Outcome: Progressing     Problem: Risk for Delirium  Goal: Optimal Coping  Intervention: Optimize Psychosocial Adjustment to Delirium  Recent Flowsheet Documentation  Taken 8/31/2024 0800 by Jonny Barrios, RN  Supportive Measures: active listening utilized   Goal Outcome Evaluation:  Patient received PRN Dilaudid 4 mg this shift for leg pain which was effective, Hgb 10.1, RBKA dressing CDI, sergeon will change it tomorrow, then nursing will do dressing changes after that. BG 72, 105.

## 2024-08-31 NOTE — PROGRESS NOTES
[facility]    Medicine Progress Note - Hospitalist Service    Date of Admission:  8/25/2024    Assessment & Plan   69-year-old male h/o lung transplant 2013, PAD, chronic limb threatening ischemia, COPD, DM2 and CKD admitted for RLE wound infection now s/p R BKA.  Postoperative course complicated by hypotension requiring vasopressors, respiratory failure and anemia requiring PRBC.    #RLE wound infection  #Peripheral arterial disease  S/p R BKA 8/28 Dr. Jay, vascular surgery  WCx: Stenotrophomonas, Enterococcus faecalis   ID consulted  s/p IV Vanc  Currently Zosyn and Bactrim, switching to Augmentin and continuing Bactrim 9/1  ASA, Plavix, statin    #Acute respiratory failure with hypoxia  #Aspiration pneumonitis  Observed aspiration intraoperatively 8/19  HFNC weaned to NC, now weaned to RA  Abx as above  DuoNebs, home inhaler    #Hypovolemic, distributive shock  Postoperatively, adrenal insufficiency with chronic steroid use  Intensivist consulted and managed vasopressors, now signed off  S/p 1L LR, albumin  S/p vasopressors   S/p IV hydrocortisone  Florinef, Prednisone    #Acute blood loss anemia  s/p 2 units PRBC  Hgb stable this AM, monitor    #Bilateral lung transplant 2013  Home prednisone, Florinef, Tacrolimus  PPx: Azithromycin, acyclovir, dapsone    #SHELLEY on CKD  #Lactic acidosis, resolved  s/p IV fluids  Monitor    #History of diabetes type 2  #Hyperglycemia due to chronic steroids  A1c 4.2  Lantus, titrate as necessary to avoid hypoglycemia    #CAD  #Hypertension  Resume home Coreg, amlodipine  Holding Lasix, lisinopril    #Severe malnutrition  RD consulted and added Glucerna  Magnesium supplementation switch from Mag-Ox to Mag-gluconate due to diarrhea  PRN imodium      DVT ppx: PCDs          Diet: Snacks/Supplements Adult: Gelatein Plus; Between Meals  Snacks/Supplements Adult: Glucerna; Between Meals  Moderate Consistent Carb (60 g CHO per Meal) Diet    [place]aranjo Catheter: Not  present  Lines: PRESENT      PICC 08/29/24 Triple Lumen Left Brachial vein medial Vasopressor,Access-Site Assessment: WDL      Cardiac Monitoring: None  Code Status: Full Code      Clinically Significant Risk Factors              # Hypoalbuminemia: Lowest albumin = 2.8 g/dL at 8/27/2024  5:22 AM, will monitor as appropriate     # Hypertension: Noted on problem list            # Severe Malnutrition: based on nutrition assessment    # Financial/Environmental Concerns: none               Disposition Plan     Medically Ready for Discharge: Anticipated in 2-4 Days             Fabián Walker DO  Hospitalist Service  [facility]  Securely message with [employer] (more info)  Text page via [employer] Paging/Directory   ______________________________________________________________________    Interval History   Feeling better today. Appetite has returned.    Physical Exam   Vital Signs: Temp: 98.1  F (36.7  C) Temp src: Oral BP: (!) 155/73 (RN notified) Pulse: 86   Resp: 18 SpO2: 92 % O2 Device: None (Room air) Oxygen Delivery: 3 LPM  Weight: 149 lbs 4.02 oz    General Appearance:  No acute distress  Respiratory: Clear to auscultation bilaterally  Cardiovascular: Regular rate and rhythm  Extremities: R BKA dressing clean dry and intact, no LLE or upper extremity edema cyanosis  : Alert and oriented x 3, normal speech    Medical Decision Making             Data

## 2024-08-31 NOTE — PROGRESS NOTES
08/31/24 1100   Appointment Info   Signing Clinician's Name / Credentials (PT) Petrona Owens, PT, DPT   Living Environment   People in Home spouse   Current Living Arrangements other (see comments)   Home Accessibility other (see comments)   Transportation Anticipated agency   Living Environment Comments Patient and spouse are planning on moving into a wheelchair accessible townhouse.   Self-Care   Usual Activity Tolerance good   Current Activity Tolerance moderate   Equipment Currently Used at Home none   Fall history within last six months no   Activity/Exercise/Self-Care Comment Patient is independent at baseline.   General Information   Onset of Illness/Injury or Date of Surgery 08/25/24   Referring Physician Fabián Walker,    Patient/Family Therapy Goals Statement (PT) Ambulate with a prosthesis   Pertinent History of Current Problem (include personal factors and/or comorbidities that impact the POC) s/p R BKA   Existing Precautions/Restrictions fall;weight bearing   Weight-Bearing Status - LLE full weight-bearing   Weight-Bearing Status - RLE nonweight-bearing   Range of Motion (ROM)   Range of Motion ROM deficits secondary to surgical procedure   Strength (Manual Muscle Testing)   Strength (Manual Muscle Testing) Deficits observed during functional mobility   Bed Mobility   Bed Mobility supine-sit   Supine-Sit [place] (Bed Mobility) set up;supervision;verbal cues   Bed Mobility Limitations decreased ability to use legs for bridging/pushing   Impairments Contributing to Impaired Bed Mobility pain;decreased strength   Assistive Device (Bed Mobility) bed rails;other (see comments)  (HOB elevated)   Transfers   Transfers bed-chair transfer;sit-stand transfer   Maintains Weight-bearing Status (Transfers) able to maintain   Impairments Contributing to Impaired Transfers impaired balance;pain;decreased strength   Bed-Chair Transfer   Assistive Device (Bed-Chair Transfers) walker, front-wheeled   Bed-Chair  Assumption (Transfers) contact guard;verbal cues   Sit-Stand Transfer   Sit-Stand [place] (Transfers) contact guard;verbal cues   Assistive Device (Sit-Stand Transfers) walker, front-wheeled   Gait/Stairs (Locomotion)   Assumption Level (Gait) unable to assess   Comment, (Gait/Stairs) Unable to assess due to fatigue with transfers.   Clinical Impression   Criteria for Skilled Therapeutic Intervention Yes, treatment indicated   PT Diagnosis (PT) Impaired functional mobility   Influenced by the following impairments s/p R BKA, pain, decreased ROM, decreased strength   Functional limitations due to impairments Bed mobility, transfers, gait, wheelchair mobility   Clinical Presentation (PT Evaluation Complexity) stable   Clinical Presentation Rationale Patient presents as medically diagnosed.   Clinical Decision Making (Complexity) low complexity   Planned Therapy Interventions (PT) balance training;bed mobility training;gait training;home exercise program;patient/family education;ROM (range of motion);strengthening;stretching;transfer training;wheelchair management/propulsion training;home program guidelines   Risk & Benefits of therapy have been explained evaluation/treatment results reviewed;care plan/treatment goals reviewed;patient   PT Total Evaluation Time   PT Nann[place], Moderate Complexity Minutes (13577) 15   Physical Therapy Goals   PT Frequency Daily   PT Predicted Duration/Target Date for Goal Attainment 09/07/24   PT Goals Bed Mobility;Transfers;Gait;Wheelchair Mobility;PT Goal 1   PT: Bed Mobility Independent;Supine to/from sit  (Flat bed mobility)   PT: Transfers Modified independent;Bed to/from chair;Assistive device;Within precautions  (FWW)   PT: Gait Modified independent;Assistive device;Within precautions;25 feet  (FWW)   PT: Wheelchair Mobility 150 feet;Caregiver SBA;manual wheelchair   PT: Goal 1 Patient will be independent with BKA HEP by time of discharge.   PT Discharge Planning   PT Plan  Transfers, short distance gait training, wheelchair mobility, BKA HEP   PT Discharge Recommendation (DC Rec) [facility]-Mot[facility] patient will benefit from intensive, interdisciplinary therapy.  Anticipate will be able to tolerate 3 hours of therapy per day   PT Rationale for DC Rec Patient was previously independent with all functional mobility, now with R BKA and would benefit from ARU to address deficits and return to PLOF. Patient is motivated to participate in therapies.   PT Brief overview of current status CGA with FWW for transfers.   PT Equipment Needed at Discharge gait belt;walker, rolling;wheelchair;wheelchair cushion   Total Session Time   Total Session Time (sum of timed and untimed services) 15

## 2024-08-31 NOTE — PROGRESS NOTES
Mr. Duncan is a 69-year-old male with previous lung transplant and alpha-1 antitrypsin lung disease who is now postop day 3 status post right capitation.  Postoperatively he developed acute hypoxic respiratory failure due to what was thought to be an aspiration event.  He has since recovered and is now weaned to room air.  Right BKA dressing was changed yesterday and was noted to be healthy.  On my exam today he is resting in bed comfortably, breathing on room air, and the right BKA dressing remains clean, dry, and intact.  Will continue his current plan of care with leave the dressing in place and I will change it tomorrow.  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  He had an angiogram 2 months ago that showed extensive tibial and pedal disease in the left leg and foot.  He will need an angiogram of the left leg with attempted intervention in the future, but does not need to remain hospitalized for this.    Mario Steele MD

## 2024-08-31 NOTE — PLAN OF CARE
Problem: Adult Inpatient Plan of Care  Goal: Absence of Hospital-Acquired Illness or Injury  Intervention: Prevent Skin Injury  Recent Flowsheet Documentation  Taken 8/31/2024 0200 by June Perez RN  Body Position:   turned   supine   log-rolled   legs elevated   foot of bed elevated     Problem: Risk for Delirium  Goal: Improved Behavioral Control  Intervention: Minimize Safety Risk  Recent Flowsheet Documentation  Taken 8/31/2024 0530 by June Perez RN  Communication Enhancement Strategies: communication board used  Enhanced Safety Measures: pain management  Taken 8/31/2024 0100 by June Perez RN  Communication Enhancement Strategies: communication board used  Enhanced Safety Measures: pain management   Goal Outcome Evaluation:    Pt weaned off O2, sats low 90s on RA  BP elevated, asymptomatic   Pain controlled with scheduled Tylenol and dilaudid

## 2024-08-31 NOTE — PROGRESS NOTES
[facility]    Infectious Disease Progress Note     08/31/2024         Assessment & Plan   Aubrey Duncan is a 69 year old male who was admitted on 8/25/2024.     ASSESSMENT:  Status post BKA 8/28/2024, acute respiratory failure, was on high flow nasal cannula, now room air.   Aspiration pneumonia/pneumonitis- Hypoxia, chest x-ray: Heterogeneous opacities are present within the medial lower lobes including some angular components consistent with atelectasis. An underlying airspace inflammatory process could also be present. Diaphragmatic curvature is  preserved. Improved.   Vomiting resolved.   Wound dehiscence, infection, stenotrophomonas, Enterococcus, now cured with BKA.  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  S/P BKA on 8/28  Comorbid conditions: Immunosuppression, peripheral vascular disease, chronic kidney disease, type 2 diabetes    Susceptibility data from last 90 days.  Collected Specimen Info Organism Ampicillin Gentamicin Synergy Levofloxacin Minocycline Trimethoprim/Sulfamethoxazole  Vancomycin   08/25/24 Wound from Leg, Below Knee, Right Stenotrophomonas maltophilia    S  S  S      Enterococcus faecalis  S  S     S     Normal jcarlos         MRSA screen negative    RECOMMENDATIONS:    Can switch to oral augmentin soon and continue bactrim. Last day of antibiotics: 9/4/24 (10 days)  Prophylaxis-dapsone, azithromycin-prior to admission, continued long term  Pulmonary team, discussed with Dr. Reaves -transplant pulmonary at [facility].  Please call us if further questions. I will sign off.       Osvaldo Hwang MD   [facility]  [phone]  ____________________________________________________________________    Interval History   Now on room air. Working with PT during visit.     Getting stronger. Temps ok. Cough improved.        Physical Exam   Temp: 98.1  F (36.7  C) Temp src: Oral BP: (!) 155/73 (RN notified) Pulse: 86   Resp: 18 SpO2: 92 % O2 Device: None (Room air) Oxygen Delivery: 3 LPM  Vitals:    08/25/24 1554 08/27/24 0749   Weight: 64.9 kg (143 lb) 67.7 kg (149 lb 4 oz)     Vital Signs with Ranges  Temp:  [97.6  F (36.4  C)-98.9  F (37.2  C)] 98.1  F (36.7  C)  Pulse:  [] 86  Resp:  [17-28] 18  BP: (112-172)/(58-83) 155/73  SpO2:  [92 %-97 %] 92 %    Constitutional: Awake, no apparent distress  Lungs: room air, normal respiratory effort   Cardiovascular: Regular rate and rhythm, S1 S2  Abdomen: non distended  Skin: Dressing on lower extremity-post op dressing  Neuro: deconditioned  Psych: able to answer questions      Photo prior to BKA      Medications   Current Facility-Administered Medications   Medication Dose Route Frequency Provider Last Rate Last Admin    norepinephrine (LEVOPHED) 4 mg in  mL infusion PREMIX  0.01-0.6 mcg/kg/min Intravenous Continuous Yakelin Wallace DO   Stopped at 08/29/24 2139     Current Facility-Administered Medications   Medication Dose Route Frequency Provider Last Rate Last Admin    acyclovir (ZOVIRAX) tablet 400 mg  400 mg Oral BID Mario Steele MD   400 mg at 08/31/24 0820    amLODIPine (NORVASC) tablet 5 mg  5 mg Oral At Bedtime Fabián Walker DO        aspirin EC tablet 81 mg  81 mg Oral Daily Mario Steele MD   81 mg at 08/31/24 0818    calcium carbonate-vitamin D (OSCAL) 500-5 MG-MCG per tablet 1 tablet  1 tablet Oral BID w/meals Mario Steele MD   1 tablet at 08/31/24 0819    carvedilol (COREG) tablet 6.25 mg  6.25 mg Oral BID w/meals Fabián Walker DO   6.25 mg at 08/31/24 0821    ceFAZolin Sodium (ANCEF) injection 2 g  2 g Intravenous Pre-Op/Pre-procedure x 1 dose Mario Steele MD        ceFAZolin Sodium (ANCEF) injection 2 g  2 g Intravenous See Admin Instructions Mario Steele MD        clopidogrel (PLAVIX) tablet 75 mg  75 mg  Oral Daily Mario Steele MD   75 mg at 08/31/24 0819    dapsone (ACZONE) tablet 50 mg  50 mg Oral Daily Mario Steele MD   50 mg at 08/31/24 0820    fludrocortisone (FLORINEF) tablet 0.1 mg  0.1 mg Oral Daily Mario Steele MD   0.1 mg at 08/31/24 0820    fluticasone-vilanterol (BREO ELLIPTA) 100-25 MCG/ACT inhaler 1 puff  1 puff Inhalation Daily Mario Steele MD   1 puff at 08/31/24 0822    [Held by provider] furosemide (LASIX) tablet 20 mg  20 mg Oral Daily Mario Steele MD   20 mg at 08/28/24 0913    insulin aspart (NovoLOG) injection (RAPID ACTING)  1-7 Units Subcutaneous TID AC Mario Steele MD   2 Units at 08/29/24 1622    insulin aspart (NovoLOG) injection (RAPID ACTING)  1-5 Units Subcutaneous At Bedtime Mario Steele MD   1 Units at 08/29/24 2102    [START ON 9/1/2024] insulin glargine (LANTUS PEN) injection 12 Units  12 Units Subcutaneous QAM AC Fabián Walker DO        [Held by provider] lisinopril (ZESTRIL) tablet 40 mg  40 mg Oral Daily Kenneth Sales DO   40 mg at 08/27/24 0918    magnesium gluconate (MAGONATE) tablet 500 mg  500 mg Oral At Bedtime aFbián Walker DO   500 mg at 08/30/24 2034    multivitamin, therapeutic (THERA-VIT) tablet 1 tablet  1 tablet Oral Daily Mairo Steele MD   1 tablet at 08/31/24 0819    pantoprazole (PROTONIX) EC tablet 40 mg  40 mg Oral Daily Mario Steele MD   40 mg at 08/31/24 0818    piperacillin-tazobactam (ZOSYN) 3.375 g vial to attach to  mL bag  3.375 g Intravenous Q8H Mario Steele MD   3.375 g at 08/31/24 0626    predniSONE (DELTASONE) tablet 2.5 mg  2.5 mg Oral QPM Vladislav Zepeda MD   2.5 mg at 08/30/24 2035    predniSONE (DELTASONE) tablet 5 mg  5 mg Oral QAM Vladislav Zepeda MD   5 mg at 08/31/24 0819    rosuvastatin (CRESTOR) tablet 20 mg  20 mg Oral Q Mon Wed Fri AM Mario Steele MD   20 mg at 08/30/24 0830    sodium chloride (PF) 0.9% PF flush 3 mL  3 mL  Intracatheter Q8H Mario Steele MD   3 mL at 08/31/24 0821    sodium chloride (PF) 0.9% PF flush 3 mL  3 mL Intracatheter Q8H Mario Steele MD   3 mL at 08/31/24 1153    sodium chloride (PF) 0.9% PF flush 3 mL  3 mL Intracatheter Q8H Mario Steele MD   3 mL at 08/31/24 1152    sulfamethoxazole-trimethoprim (BACTRIM DS) 800-160 MG per tablet 1 tablet  1 tablet Oral Daily Patricia Palacios MD   1 tablet at 08/31/24 0821    tacrolimus (GENERIC EQUIVALENT) capsule 3 mg  3 mg Oral QAM Vladislav Zepeda MD   3 mg at 08/31/24 0820    And    tacrolimus (GENERIC EQUIVALENT) capsule 3 mg  3 mg Oral QPM Vladislav Zepeda MD   3 mg at 08/30/24 2035    wound support modular (EXPEDITE) bottle 60 mL  60 mL Oral Daily Mario Steele MD   60 mL at 08/31/24 1008       Data   All microbiology laboratory data reviewed.  Recent Labs   Lab Test 08/31/24  0616 08/30/24  1142 08/30/24  0515 08/29/24  0400   WBC 9.9  --  10.1 11.3*   HGB 10.1* 7.0* 5.5* 8.0*   HCT 31.1* 21.2* 17.0* 25.3*   MCV 95  --  99 101*     --  214 398     Recent Labs   Lab Test 08/31/24  0616 08/30/24  0444 08/30/24  0010   CR 1.11 1.87* 2.01*     Recent Labs   Lab Test 08/25/24  1632   SED 52*     Recent Labs   Lab Test 07/06/21  1101 11/27/20  1430 10/16/20  0939 09/11/20  0818 07/16/20  0856 07/02/20  1042 06/30/20  1056 02/27/19  0737 02/25/19  1814   CULT Heavy growth  Klebsiella pneumoniae  *  Heavy growth  Pseudomonas aeruginosa  * Moderate growth  Pseudomonas aeruginosa  * Heavy growth  Pseudomonas aeruginosa  Some antibiotics have been suppressed due to the known inducible beta lactamase activity.   Please contact Microbiology for more information.  * Heavy growth  Escherichia coli  *  Heavy growth  Enterococcus faecalis  * Moderate growth  Stenotrophomonas maltophilia  * No growth after 6 days  No growth after 6 days Heavy growth  Klebsiella pneumoniae  * Canceled, Test credited  >10 Squamous epithelial cells/low power  field indicates oral contamination. Please   recollect.  *  Notification of test cancellation was given to   SOHA VALLE RN @0951 2/27/19. CT   No growth       MICROBIOLOGY:    Reviewed    7-Day Micro Results       Collected Updated Procedure Result Status      08/29/2024 1959 08/30/2024 0541 Respiratory Panel PCR [[id number]]    Swab from Nasopharyngeal    Final result Component Value   Adenovirus Not Detected   Coronavirus Not Detected   This test detects Coronavirus 229E, HKU1, NL63 and OC43 but does not distinguish between them. It does not detect MERS ( Respiratory Syndrome), SARS (Severe Acute Respiratory Syndrome) or 2019-nCoV (Novel 2019) Coronavirus.   Human Metapneumovirus Not Detected   Human Rhin/Enterovirus Not Detected   Influenza A Not Detected   Influenza A, H1 Not Detected   Influenza A 2009 H1N1 Not Detected   Influenza A, H3 Not Detected   Influenza B Not Detected   Parainfluenza Virus 1 Not Detected   Parainfluenza Virus 2 Not Detected   Parainfluenza Virus 3 Not Detected   Parainfluenza Virus 4 Not Detected   Respiratory Syncytial Virus A Not Detected   Respiratory Syncytial Virus B Not Detected   Chlamydia Pneumoniae Not Detected   Mycoplasma Pneumoniae Not Detected            08/29/2024 1640 08/29/2024 2005 Respiratory Aerobic Bacterial Culture [[id number]]   Sputum from Expectorate    Final result Component Value   Culture >10 Squamous epithelial cells/low power field indicates oral contamination. Please recollect.   Gram Stain Result >10 Squamous epithelial cells/low power field    <25 PMNs/low power field    2+ Mixed jcarlos               08/29/2024 0846 08/29/2024 1428 MRSA MSSA PCR, Nasal Swab [[id number]]    Swab from Nare, Right    Final result Component Value   MRSA Target DNA Negative   SA Target DNA Negative            08/25/2024 1702 08/28/2024 1425 Wound Aerobic Bacterial Culture Routine With Gram Stain [[id number]]    (Abnormal)   Wound from Leg, Below  "Knee, Right    Final result Component Value   Culture 4+ Stenotrophomonas maltophilia    4+ Enterococcus faecalis    4+ Normal jcarlos   Gram Stain Result 4+ Gram negative bacilli    4+ Gram positive cocci    2+ WBC seen        Susceptibility        Stenotrophomonas maltophilia      DELTA      Ceftazidime 4 ug/mL Susceptible  [*]       Ceftazidime Avibactam 4 ug/mL No interpretation available  [*]       Ceftolozane/Tazobactam 8 ug/mL No interpretation available  [*]       Levofloxacin 2 ug/mL Susceptible      Minocycline <=1 ug/mL Susceptible      Trimethoprim/Sulfamethoxazole <=2/38 ug/mL Susceptible                   [*]  Suppressed Antibiotic               Susceptibility        Enterococcus faecalis      DELTA      Ampicillin <=2 ug/mL Susceptible      Ciprofloxacin <=0.5 ug/mL Susceptible  [*]       Doxycycline <=0.5 ug/mL Susceptible  [*]       Gentamicin Synergy Susceptible ug/mL Susceptible  [1]       Levofloxacin 1 ug/mL Susceptible  [*]       Linezolid 2 ug/mL Susceptible  [*]       Nitrofurantoin <=16 ug/mL Susceptible  [*]       Streptomycin Synergy Susceptible ug/mL Susceptible  [*]       Tigecycline <=0.12 ug/mL Susceptible  [*]       Vancomycin 1 ug/mL Susceptible                   [*]  Suppressed Antibiotic     [1]  No high level gentamicin resistance found - therefore combination therapy with an aminoglycoside may be indicated for serious enterococcal infections such as bacteremia and endocarditis.               Susceptibility Comments       Stenotrophomonas maltophilia    Antibiotics listed as \"No Interpretation\" have no regulatory guidelines for susceptibility/resistance available.               08/25/2024 1643 08/30/2024 1816 Blood Culture Arm, Right [[id number]]   Blood from Arm, Right    Final result Component Value   Culture No Growth                        RADIOLOGY:    Reviewed  POC US Guidance Needle Placement    Result Date: 8/1/2024  Ultrasound was performed as guidance to an anesthesia " "procedure.  Click \"PACS images\" hyperlink below to view any stored images.  For specific procedure details, view procedure note authored by anesthesia.           "

## 2024-09-01 ENCOUNTER — APPOINTMENT (OUTPATIENT)
Dept: OCCUPATIONAL THERAPY | Facility: HOSPITAL | Age: 69
DRG: 856 | End: 2024-09-01
Payer: MEDICARE

## 2024-09-01 ENCOUNTER — APPOINTMENT (OUTPATIENT)
Dept: PHYSICAL THERAPY | Facility: HOSPITAL | Age: 69
DRG: 856 | End: 2024-09-01
Payer: MEDICARE

## 2024-09-01 LAB
ANION GAP SERPL CALCULATED.3IONS-SCNC: 10 MMOL/L (ref 7–15)
BUN SERPL-MCNC: 32.7 MG/DL (ref 8–23)
CALCIUM SERPL-MCNC: 8.1 MG/DL (ref 8.8–10.4)
CHLORIDE SERPL-SCNC: 109 MMOL/L (ref 98–107)
CREAT SERPL-MCNC: 0.93 MG/DL (ref 0.67–1.17)
EGFRCR SERPLBLD CKD-EPI 2021: 88 ML/MIN/1.73M2
GLUCOSE BLDC GLUCOMTR-MCNC: 110 MG/DL (ref 70–99)
GLUCOSE BLDC GLUCOMTR-MCNC: 112 MG/DL (ref 70–99)
GLUCOSE BLDC GLUCOMTR-MCNC: 113 MG/DL (ref 70–99)
GLUCOSE BLDC GLUCOMTR-MCNC: 128 MG/DL (ref 70–99)
GLUCOSE BLDC GLUCOMTR-MCNC: 69 MG/DL (ref 70–99)
GLUCOSE SERPL-MCNC: 67 MG/DL (ref 70–99)
HCO3 SERPL-SCNC: 25 MMOL/L (ref 22–29)
HCT VFR BLD AUTO: 29.8 % (ref 40–53)
HGB BLD-MCNC: 9.7 G/DL (ref 13.3–17.7)
POTASSIUM SERPL-SCNC: 3.9 MMOL/L (ref 3.4–5.3)
SODIUM SERPL-SCNC: 144 MMOL/L (ref 135–145)

## 2024-09-01 PROCEDURE — 97110 THERAPEUTIC EXERCISES: CPT | Mod: GP

## 2024-09-01 PROCEDURE — 250N000013 HC RX MED GY IP 250 OP 250 PS 637: Performed by: INTERNAL MEDICINE

## 2024-09-01 PROCEDURE — 85014 HEMATOCRIT: CPT | Performed by: HOSPITALIST

## 2024-09-01 PROCEDURE — 250N000013 HC RX MED GY IP 250 OP 250 PS 637: Performed by: STUDENT IN AN ORGANIZED HEALTH CARE EDUCATION/TRAINING PROGRAM

## 2024-09-01 PROCEDURE — 250N000012 HC RX MED GY IP 250 OP 636 PS 637: Performed by: STUDENT IN AN ORGANIZED HEALTH CARE EDUCATION/TRAINING PROGRAM

## 2024-09-01 PROCEDURE — 80048 BASIC METABOLIC PNL TOTAL CA: CPT | Performed by: HOSPITALIST

## 2024-09-01 PROCEDURE — 250N000013 HC RX MED GY IP 250 OP 250 PS 637: Performed by: HOSPITALIST

## 2024-09-01 PROCEDURE — 120N000001 HC R&B MED SURG/OB

## 2024-09-01 PROCEDURE — 97535 SELF CARE MNGMENT TRAINING: CPT | Mod: GO

## 2024-09-01 PROCEDURE — 97110 THERAPEUTIC EXERCISES: CPT | Mod: GO

## 2024-09-01 PROCEDURE — 99232 SBSQ HOSP IP/OBS MODERATE 35: CPT | Performed by: HOSPITALIST

## 2024-09-01 RX ORDER — AMLODIPINE BESYLATE 5 MG/1
5 TABLET ORAL 2 TIMES DAILY
Status: DISCONTINUED | OUTPATIENT
Start: 2024-09-01 | End: 2024-09-04 | Stop reason: HOSPADM

## 2024-09-01 RX ORDER — CARVEDILOL 12.5 MG/1
12.5 TABLET ORAL 2 TIMES DAILY WITH MEALS
Status: DISCONTINUED | OUTPATIENT
Start: 2024-09-01 | End: 2024-09-03

## 2024-09-01 RX ORDER — HYDROMORPHONE HYDROCHLORIDE 1 MG/ML
0.2 INJECTION, SOLUTION INTRAMUSCULAR; INTRAVENOUS; SUBCUTANEOUS
Status: DISCONTINUED | OUTPATIENT
Start: 2024-09-01 | End: 2024-09-04 | Stop reason: HOSPADM

## 2024-09-01 RX ORDER — HYDROMORPHONE HYDROCHLORIDE 1 MG/ML
0.4 INJECTION, SOLUTION INTRAMUSCULAR; INTRAVENOUS; SUBCUTANEOUS
Status: DISCONTINUED | OUTPATIENT
Start: 2024-09-01 | End: 2024-09-04 | Stop reason: HOSPADM

## 2024-09-01 RX ORDER — HYDROMORPHONE HYDROCHLORIDE 2 MG/1
2 TABLET ORAL EVERY 4 HOURS PRN
Status: DISCONTINUED | OUTPATIENT
Start: 2024-09-01 | End: 2024-09-04 | Stop reason: HOSPADM

## 2024-09-01 RX ADMIN — ACETAMINOPHEN 650 MG: 325 TABLET ORAL at 20:28

## 2024-09-01 RX ADMIN — AMOXICILLIN AND CLAVULANATE POTASSIUM 1 TABLET: 875; 125 TABLET, FILM COATED ORAL at 20:18

## 2024-09-01 RX ADMIN — THERA TABS 1 TABLET: TAB at 08:07

## 2024-09-01 RX ADMIN — FLUDROCORTISONE ACETATE 0.1 MG: 0.1 TABLET ORAL at 08:06

## 2024-09-01 RX ADMIN — AMOXICILLIN AND CLAVULANATE POTASSIUM 1 TABLET: 875; 125 TABLET, FILM COATED ORAL at 08:07

## 2024-09-01 RX ADMIN — ACYCLOVIR 400 MG: 400 TABLET ORAL at 08:07

## 2024-09-01 RX ADMIN — ACETAMINOPHEN 650 MG: 325 TABLET ORAL at 08:07

## 2024-09-01 RX ADMIN — DAPSONE 50 MG: 25 TABLET ORAL at 08:06

## 2024-09-01 RX ADMIN — CARVEDILOL 12.5 MG: 12.5 TABLET, FILM COATED ORAL at 08:07

## 2024-09-01 RX ADMIN — TACROLIMUS 3 MG: 1 CAPSULE ORAL at 08:06

## 2024-09-01 RX ADMIN — LOPERAMIDE HYDROCHLORIDE 2 MG: 2 CAPSULE ORAL at 08:07

## 2024-09-01 RX ADMIN — PREDNISONE 2.5 MG: 2.5 TABLET ORAL at 20:18

## 2024-09-01 RX ADMIN — PANTOPRAZOLE SODIUM 40 MG: 20 TABLET, DELAYED RELEASE ORAL at 08:08

## 2024-09-01 RX ADMIN — SULFAMETHOXAZOLE AND TRIMETHOPRIM 1 TABLET: 800; 160 TABLET ORAL at 20:18

## 2024-09-01 RX ADMIN — HYDROMORPHONE HYDROCHLORIDE 2 MG: 2 TABLET ORAL at 00:55

## 2024-09-01 RX ADMIN — CARVEDILOL 12.5 MG: 12.5 TABLET, FILM COATED ORAL at 17:33

## 2024-09-01 RX ADMIN — Medication 500 MG: at 20:18

## 2024-09-01 RX ADMIN — LOPERAMIDE HYDROCHLORIDE 2 MG: 2 CAPSULE ORAL at 00:55

## 2024-09-01 RX ADMIN — ACYCLOVIR 400 MG: 400 TABLET ORAL at 20:18

## 2024-09-01 RX ADMIN — PREDNISONE 5 MG: 5 TABLET ORAL at 08:07

## 2024-09-01 RX ADMIN — AMLODIPINE BESYLATE 5 MG: 5 TABLET ORAL at 12:02

## 2024-09-01 RX ADMIN — Medication 1 TABLET: at 17:33

## 2024-09-01 RX ADMIN — ASPIRIN 81 MG: 81 TABLET, COATED ORAL at 08:07

## 2024-09-01 RX ADMIN — SULFAMETHOXAZOLE AND TRIMETHOPRIM 1 TABLET: 800; 160 TABLET ORAL at 08:06

## 2024-09-01 RX ADMIN — CLOPIDOGREL BISULFATE 75 MG: 75 TABLET ORAL at 08:08

## 2024-09-01 RX ADMIN — FLUTICASONE FUROATE AND VILANTEROL TRIFENATATE 1 PUFF: 100; 25 POWDER RESPIRATORY (INHALATION) at 08:08

## 2024-09-01 RX ADMIN — Medication 60 ML: at 08:08

## 2024-09-01 RX ADMIN — AMLODIPINE BESYLATE 5 MG: 5 TABLET ORAL at 20:18

## 2024-09-01 RX ADMIN — Medication 1 TABLET: at 08:08

## 2024-09-01 RX ADMIN — LOPERAMIDE HYDROCHLORIDE 2 MG: 2 CAPSULE ORAL at 20:28

## 2024-09-01 RX ADMIN — TACROLIMUS 3 MG: 1 CAPSULE ORAL at 20:19

## 2024-09-01 ASSESSMENT — ACTIVITIES OF DAILY LIVING (ADL)
ADLS_ACUITY_SCORE: 40
ADLS_ACUITY_SCORE: 47
ADLS_ACUITY_SCORE: 40

## 2024-09-01 NOTE — PROGRESS NOTES
[facility]    Medicine Progress Note - Hospitalist Service    Date of Admission:  8/25/2024    Assessment & Plan   69-year-old male h/o lung transplant 2013, PAD, chronic limb threatening ischemia, COPD, DM2 and CKD admitted for RLE wound infection now s/p R BKA.  Postoperative course complicated by hypotension requiring vasopressors, respiratory failure and anemia requiring PRBC.    #RLE wound infection  #Peripheral arterial disease  S/p R BKA 8/28 Dr. Jay, vascular surgery  WCx: Stenotrophomonas, Enterococcus faecalis   ID consulted  s/p IV Vanc  Zosyn switched to Augmentin, continued Bactrim thru 9/4  ASA, Plavix, statin    #Acute respiratory failure with hypoxia  #Aspiration pneumonitis  Observed aspiration intraoperatively 8/19  HFNC weaned to NC, now weaned to RA  Abx as above  DuoNebs, home inhaler    #Hypovolemic, distributive shock  Postoperatively, adrenal insufficiency with chronic steroid use  Intensivist consulted and managed vasopressors, now signed off  S/p 1L LR, albumin  S/p vasopressors   S/p IV hydrocortisone  Florinef, Prednisone    #Acute blood loss anemia  s/p 2 units PRBC  Hgb stable     #Bilateral lung transplant 2013  Home prednisone, Florinef, Tacrolimus  PPx: Azithromycin, acyclovir, dapsone    #SHELLEY on CKD  #Lactic acidosis, resolved  s/p IV fluids  Monitor    #History of diabetes type 2  #Hyperglycemia due to chronic steroids  A1c 4.2  Stop Lantus 2/2 hypoglycemia  SSI    #CAD  #Hypertension  Increase Coreg, amlodipine  Holding Lasix, lisinopril    #Severe malnutrition  #Intermittent chronic diarrhea  RD consulted and added Glucerna  Mag-Ox switched to Mag-gluconate  PRN imodium      DVT ppx: PCDs        Diet: Snacks/Supplements Adult: Gelatein Plus; Between Meals  Snacks/Supplements Adult: Glucerna; Between Meals  Moderate Consistent Carb (60 g CHO per Meal) Diet    [place] Catheter: Not present  Lines: PRESENT      PICC 08/29/24 Triple Lumen Left Brachial vein  medial Vasopressor,Access-Site Assessment: WDL      Cardiac Monitoring: None  Code Status: Full Code      Clinically Significant Risk Factors              # Hypoalbuminemia: Lowest albumin = 2.8 g/dL at 8/27/2024  5:22 AM, will monitor as appropriate     # Hypertension: Noted on problem list            # Severe Malnutrition: based on nutrition assessment    # Financial/Environmental Concerns: none               Disposition Plan     Medically Ready for Discharge: Anticipated in 2-4 Days             Fabián Walker DO  Hospitalist Service  [facility]  Securely message with [employer] (more info)  Text page via [employer] Paging/Directory   ______________________________________________________________________    Interval History   No acute events overnight    Physical Exam   Vital Signs: Temp: 98.6  F (37  C) Temp src: Oral BP: (!) 168/83 (RN notified) Pulse: 95   Resp: 18 SpO2: 96 % O2 Device: None (Room air)    Weight: 149 lbs 4.02 oz    General Appearance:  No acute distress  Respiratory: Clear to auscultation bilaterally  Cardiovascular: Regular rate and rhythm      Medical Decision Making             Data

## 2024-09-01 NOTE — PROGRESS NOTES
Patient seen and examined at bedside.  Doing well.  Pain controlled.  Continues loose stools, ?Secondary to antibiotics.  Vitals and labs reviewed.  Right BKA dressing changed, wound appears healthy, pic below.  New dressing applied.  Nurse to change dressing every other day while he remains admitted.  Wound care orders placed for discharge.  Follow-up with vascular surgery in 1 month as scheduled on 9/25/2024  Vascular surgery will sign off, please call with questions.    Mario Steele MD

## 2024-09-01 NOTE — PLAN OF CARE
"  Problem: Adult Inpatient Plan of Care  Goal: Plan of Care Review  Description: The Plan of Care Review/Shift note should be completed every shift.  The Outcome Evaluation is a brief statement about your assessment that the patient is improving, declining, or no change.  This information will be displayed automatically on your shift  note.  Outcome: Progressing  Goal: Patient-Specific Goal (Individualized)  Description: You can add care plan individualizations to a care plan. Examples of Individualization might be:  \"Parent requests to be called daily at 9am for status\", \"I have a hard time hearing out of my right ear\", or \"Do not touch me to wake me up as it startles  me\".  Outcome: Progressing  Goal: Absence of Hospital-Acquired Illness or Injury  Outcome: Progressing  Intervention: Prevent Skin Injury  Recent Flowsheet Documentation  Taken 8/31/2024 2053 by David Baron RN  Body Position: turned  Taken 8/31/2024 1826 by David Baron RN  Body Position: turned  Taken 8/31/2024 1653 by David Baron RN  Body Position: turned  Intervention: Prevent Infection  Recent Flowsheet Documentation  Taken 8/31/2024 1700 by David Baron RN  Infection Prevention: hand hygiene promoted  Goal: Optimal Comfort and Wellbeing  Outcome: Progressing  Intervention: Provide Person-Centered Care  Recent Flowsheet Documentation  Taken 8/31/2024 1700 by David Baron RN  Trust Relationship/Rapport: care explained  Goal: Readiness for Transition of Care  Outcome: Progressing     Problem: Risk for Delirium  Goal: Optimal Coping  Outcome: Progressing  Intervention: Optimize Psychosocial Adjustment to Delirium  Recent Flowsheet Documentation  Taken 8/31/2024 1700 by David Baron RN  Supportive Measures: active listening utilized  Goal: Improved Behavioral Control  Outcome: Progressing  Intervention: Minimize Safety Risk  Recent Flowsheet Documentation  Taken 8/31/2024 1700 by David Baron" RN  Trust Relationship/Rapport: care explained  Goal: Improved Attention and Thought Clarity  Outcome: Progressing  Goal: Improved Sleep  Outcome: Progressing     Problem: Infection  Goal: Absence of Infection Signs and Symptoms  Outcome: Progressing     Problem: Pain Acute  Goal: Optimal Pain Control and Function  Outcome: Progressing  Intervention: Optimize Psychosocial Wellbeing  Recent Flowsheet Documentation  Taken 8/31/2024 1700 by David Baron RN  Supportive Measures: active listening utilized     Problem: Glycemic Control Impaired  Goal: Blood Glucose Level Within Targeted Range  Outcome: Progressing  Goal: Minimize Risk of Hypoglycemia  Outcome: Progressing     Problem: Acute Kidney Injury/Impairment  Goal: Fluid and Electrolyte Balance  Outcome: Progressing  Goal: Improved Oral Intake  Outcome: Progressing  Goal: Effective Renal Function  Outcome: Progressing     Problem: Hypertension Acute  Goal: Blood Pressure Within Desired Range  Outcome: Progressing     Problem: COPD (Chronic Obstructive Pulmonary Disease)  Goal: Optimal Chronic Illness Coping  Outcome: Progressing  Intervention: Support and Optimize Psychosocial Response  Recent Flowsheet Documentation  Taken 8/31/2024 1700 by David Baron RN  Supportive Measures: active listening utilized  Goal: Optimal Level of Functional [place]  Outcome: Progressing  Intervention: Optimize Functional Ability  Recent Flowsheet Documentation  Taken 8/31/2024 1700 by David Baron RN  Environmental Support: calm environment promoted  Goal: Absence of Infection Signs and Symptoms  Outcome: Progressing  Goal: Improved Oral Intake  Outcome: Progressing  Goal: Effective Oxygenation and Ventilation  Outcome: Progressing  Intervention: Promote Airway Secretion Clearance  Recent Flowsheet Documentation  Taken 8/31/2024 1700 by David Baron RN  Cough And Deep Breathing: done independently per patient  Intervention: Optimize Oxygenation and  Ventilation  Recent Flowsheet Documentation  Taken 8/31/2024 2244 by David Baron RN  Head of Bed (HOB) Positioning: HOB at 20-30 degrees  Taken 8/31/2024 2053 by David Baron RN  Head of Bed (HOB) Positioning: HOB at 20-30 degrees  Taken 8/31/2024 1826 by David Baron RN  Head of Bed (HOB) Positioning: HOB at 20-30 degrees  Taken 8/31/2024 1653 by David Baron RN  Head of Bed (HOB) Positioning: HOB at 20-30 degrees     Problem: Mobility Impairment  Goal: Optimal Mobility  Outcome: Progressing  Intervention: Optimize Mobility  Recent Flowsheet Documentation  Taken 8/31/2024 2244 by David Baron RN  Positioning/Transfer Devices:   pillows   in use  Taken 8/31/2024 2053 by David Baron RN  Positioning/Transfer Devices:   pillows   in use  Taken 8/31/2024 1826 by David Baron RN  Positioning/Transfer Devices:   pillows   in use  Taken 8/31/2024 1653 by David Baron RN  Positioning/Transfer Devices:   pillows   in use   Goal Outcome Evaluation:         Pt is alert and oriented x4, denies pain, one time loose stool during the shift and prn imodium was given. Iv administered as ordered.

## 2024-09-01 NOTE — PLAN OF CARE
"  Problem: Adult Inpatient Plan of Care  Goal: Plan of Care Review  Description: The Plan of Care Review/Shift note should be completed every shift.  The Outcome Evaluation is a brief statement about your assessment that the patient is improving, declining, or no change.  This information will be displayed automatically on your shift  note.  Outcome: Progressing  Flowsheets (Taken 9/1/2024 0933)  Plan of Care Reviewed With: patient  Overall Patient Progress: improving  Goal: Patient-Specific Goal (Individualized)  Description: You can add care plan individualizations to a care plan. Examples of Individualization might be:  \"Parent requests to be called daily at 9am for status\", \"I have a hard time hearing out of my right ear\", or \"Do not touch me to wake me up as it startles  me\".  Outcome: Progressing  Goal: Absence of Hospital-Acquired Illness or Injury  Outcome: Progressing  Intervention: Identify and Manage Fall Risk  Recent Flowsheet Documentation  Taken 9/1/2024 0850 by Penny Riggs RN  Safety Promotion/Fall Prevention:   lighting adjusted   room door open  Intervention: Prevent Skin Injury  Recent Flowsheet Documentation  Taken 9/1/2024 0850 by Penny Riggs RN  Body Position: position changed independently  Intervention: Prevent Infection  Recent Flowsheet Documentation  Taken 9/1/2024 0850 by Penny Riggs RN  Infection Prevention:   rest/sleep promoted   single patient room provided  Goal: Optimal Comfort and Wellbeing  Outcome: Progressing  Intervention: Monitor Pain and Promote Comfort  Recent Flowsheet Documentation  Taken 9/1/2024 0807 by Penny Riggs RN  Pain Management Interventions: medication (see MAR)  Goal: Readiness for Transition of Care  Outcome: Progressing     Goal Outcome Evaluation:      Plan of Care Reviewed With: patient    Overall Patient Progress: improving    Pt. A&O x4 and on room air. Pt. Has pain of 4. Gave PRN Tylenol (see Mar). CHG done. Call light within reach. Able to make needs " known. Blood sugar was 69 this morning. Gave orange juice and pt. Had breakfast.     Blood sugar recheck was 110.

## 2024-09-01 NOTE — PLAN OF CARE
"Goal Outcome Evaluation:         Problem: Adult Inpatient Plan of Care  Goal: Plan of Care Review  Description: The Plan of Care Review/Shift note should be completed every shift.  The Outcome Evaluation is a brief statement about your assessment that the patient is improving, declining, or no change.  This information will be displayed automatically on your shift  note.  Outcome: Progressing  Goal: Patient-Specific Goal (Individualized)  Description: You can add care plan individualizations to a care plan. Examples of Individualization might be:  \"Parent requests to be called daily at 9am for status\", \"I have a hard time hearing out of my right ear\", or \"Do not touch me to wake me up as it startles  me\".  Outcome: Progressing  Goal: Absence of Hospital-Acquired Illness or Injury  Outcome: Progressing  Intervention: Identify and Manage Fall Risk  Recent Flowsheet Documentation  Taken 9/1/2024 0055 by Sarah Granados RN  Safety Promotion/Fall Prevention:   lighting adjusted   room door open  Intervention: Prevent Skin Injury  Recent Flowsheet Documentation  Taken 9/1/2024 0055 by Sarah Granados RN  Body Position: position changed independently  Skin Protection: (stump protector) other (see comments)  Intervention: Prevent Infection  Recent Flowsheet Documentation  Taken 9/1/2024 0055 by Sarah Granados RN  Infection Prevention:   rest/sleep promoted   single patient room provided  Goal: Optimal Comfort and Wellbeing  Outcome: Progressing  Intervention: Monitor Pain and Promote Comfort  Recent Flowsheet Documentation  Taken 9/1/2024 0140 by Sarah Granados RN  Pain Management Interventions: emotional support  Taken 9/1/2024 0055 by Sarah Granados RN  Pain Management Interventions:   medication (see MAR)   emotional support  Intervention: Provide Person-Centered Care  Recent Flowsheet Documentation  Taken 9/1/2024 0055 by Sarah Granados RN  Trust Relationship/Rapport:   care explained   reassurance " provided   emotional support provided  Goal: Readiness for Transition of Care  Outcome: Progressing  Intervention: Mutually Develop Transition Plan  Recent Flowsheet Documentation  Taken 9/1/2024 0000 by Sarah Granados, RN  Equipment Currently Used at Home: grab bar, tub/shower       Pt a/o with VSS on room air. Pt pain controlled with prn dilaudid. Pt requested prn imodium for chronic diarrhea. Will monitor.

## 2024-09-02 LAB
GLUCOSE BLDC GLUCOMTR-MCNC: 101 MG/DL (ref 70–99)
GLUCOSE BLDC GLUCOMTR-MCNC: 132 MG/DL (ref 70–99)
GLUCOSE BLDC GLUCOMTR-MCNC: 158 MG/DL (ref 70–99)
GLUCOSE BLDC GLUCOMTR-MCNC: 218 MG/DL (ref 70–99)
GLUCOSE BLDC GLUCOMTR-MCNC: 83 MG/DL (ref 70–99)

## 2024-09-02 PROCEDURE — 250N000012 HC RX MED GY IP 250 OP 636 PS 637: Performed by: STUDENT IN AN ORGANIZED HEALTH CARE EDUCATION/TRAINING PROGRAM

## 2024-09-02 PROCEDURE — 250N000011 HC RX IP 250 OP 636: Performed by: STUDENT IN AN ORGANIZED HEALTH CARE EDUCATION/TRAINING PROGRAM

## 2024-09-02 PROCEDURE — 120N000001 HC R&B MED SURG/OB

## 2024-09-02 PROCEDURE — 250N000013 HC RX MED GY IP 250 OP 250 PS 637: Performed by: HOSPITALIST

## 2024-09-02 PROCEDURE — 250N000013 HC RX MED GY IP 250 OP 250 PS 637: Performed by: STUDENT IN AN ORGANIZED HEALTH CARE EDUCATION/TRAINING PROGRAM

## 2024-09-02 PROCEDURE — 99232 SBSQ HOSP IP/OBS MODERATE 35: CPT | Performed by: HOSPITALIST

## 2024-09-02 PROCEDURE — 250N000013 HC RX MED GY IP 250 OP 250 PS 637: Performed by: INTERNAL MEDICINE

## 2024-09-02 RX ORDER — LISINOPRIL 20 MG/1
20 TABLET ORAL DAILY
Status: DISCONTINUED | OUTPATIENT
Start: 2024-09-02 | End: 2024-09-03

## 2024-09-02 RX ADMIN — Medication 60 ML: at 08:14

## 2024-09-02 RX ADMIN — Medication 500 MG: at 20:28

## 2024-09-02 RX ADMIN — TACROLIMUS 3 MG: 1 CAPSULE ORAL at 08:13

## 2024-09-02 RX ADMIN — PREDNISONE 2.5 MG: 2.5 TABLET ORAL at 20:29

## 2024-09-02 RX ADMIN — ACETAMINOPHEN 650 MG: 325 TABLET ORAL at 20:27

## 2024-09-02 RX ADMIN — FLUDROCORTISONE ACETATE 0.1 MG: 0.1 TABLET ORAL at 08:13

## 2024-09-02 RX ADMIN — FLUTICASONE FUROATE AND VILANTEROL TRIFENATATE 1 PUFF: 100; 25 POWDER RESPIRATORY (INHALATION) at 08:12

## 2024-09-02 RX ADMIN — CARVEDILOL 12.5 MG: 12.5 TABLET, FILM COATED ORAL at 08:11

## 2024-09-02 RX ADMIN — AZITHROMYCIN DIHYDRATE 250 MG: 250 TABLET, FILM COATED ORAL at 08:12

## 2024-09-02 RX ADMIN — ACYCLOVIR 400 MG: 400 TABLET ORAL at 08:13

## 2024-09-02 RX ADMIN — CARVEDILOL 12.5 MG: 12.5 TABLET, FILM COATED ORAL at 17:00

## 2024-09-02 RX ADMIN — PANTOPRAZOLE SODIUM 40 MG: 20 TABLET, DELAYED RELEASE ORAL at 08:11

## 2024-09-02 RX ADMIN — ASPIRIN 81 MG: 81 TABLET, COATED ORAL at 08:11

## 2024-09-02 RX ADMIN — AMOXICILLIN AND CLAVULANATE POTASSIUM 1 TABLET: 875; 125 TABLET, FILM COATED ORAL at 08:11

## 2024-09-02 RX ADMIN — INSULIN ASPART 2 UNITS: 100 INJECTION, SOLUTION INTRAVENOUS; SUBCUTANEOUS at 16:58

## 2024-09-02 RX ADMIN — ROSUVASTATIN 20 MG: 10 TABLET, FILM COATED ORAL at 08:12

## 2024-09-02 RX ADMIN — AMLODIPINE BESYLATE 5 MG: 5 TABLET ORAL at 20:27

## 2024-09-02 RX ADMIN — SULFAMETHOXAZOLE AND TRIMETHOPRIM 1 TABLET: 800; 160 TABLET ORAL at 20:28

## 2024-09-02 RX ADMIN — LOPERAMIDE HYDROCHLORIDE 2 MG: 2 CAPSULE ORAL at 08:12

## 2024-09-02 RX ADMIN — SULFAMETHOXAZOLE AND TRIMETHOPRIM 1 TABLET: 800; 160 TABLET ORAL at 08:11

## 2024-09-02 RX ADMIN — HYDROMORPHONE HYDROCHLORIDE 1 MG: 2 TABLET ORAL at 08:27

## 2024-09-02 RX ADMIN — Medication 2 CAPSULE: at 20:28

## 2024-09-02 RX ADMIN — CLOPIDOGREL BISULFATE 75 MG: 75 TABLET ORAL at 08:12

## 2024-09-02 RX ADMIN — THERA TABS 1 TABLET: TAB at 08:12

## 2024-09-02 RX ADMIN — ONDANSETRON 4 MG: 2 INJECTION INTRAMUSCULAR; INTRAVENOUS at 08:22

## 2024-09-02 RX ADMIN — TACROLIMUS 3 MG: 1 CAPSULE ORAL at 20:28

## 2024-09-02 RX ADMIN — Medication 1 TABLET: at 08:11

## 2024-09-02 RX ADMIN — PREDNISONE 5 MG: 5 TABLET ORAL at 08:12

## 2024-09-02 RX ADMIN — ACETAMINOPHEN 650 MG: 325 TABLET ORAL at 08:26

## 2024-09-02 RX ADMIN — ACYCLOVIR 400 MG: 400 TABLET ORAL at 20:29

## 2024-09-02 RX ADMIN — DAPSONE 50 MG: 25 TABLET ORAL at 08:13

## 2024-09-02 RX ADMIN — AMLODIPINE BESYLATE 5 MG: 5 TABLET ORAL at 08:12

## 2024-09-02 RX ADMIN — Medication 2 CAPSULE: at 10:56

## 2024-09-02 RX ADMIN — Medication 1 TABLET: at 17:00

## 2024-09-02 RX ADMIN — LISINOPRIL 20 MG: 20 TABLET ORAL at 11:01

## 2024-09-02 RX ADMIN — AMOXICILLIN AND CLAVULANATE POTASSIUM 1 TABLET: 875; 125 TABLET, FILM COATED ORAL at 20:28

## 2024-09-02 ASSESSMENT — ACTIVITIES OF DAILY LIVING (ADL)
ADLS_ACUITY_SCORE: 40

## 2024-09-02 NOTE — CONSULTS
Care Management Follow Up    Length of Stay (days): 8    Expected Discharge Date: 09/03/2024     Concerns to be Addressed: discharge planning     Patient plan of care discussed at interdisciplinary rounds: Yes    Anticipated Discharge Disposition: Transitional Care              Anticipated Discharge Services: Other (see comment) (may need transportation services.)  Anticipated Discharge DME: Other (see comment) (unknown at this time.)    Patient/family educated on Medicare website which has current facility and service quality ratings:    Education Provided on the Discharge Plan:    Patient/Family in Agreement with the Plan:      Referrals Placed by CM/SW:    Private pay costs discussed: Not applicable    Discussed  Partnership in Safe Discharge Planning  document with patient/family: Yes: patient      Handoff Completed: No, handoff not indicated or clinically appropriate    Additional Information:  SW met with patient at bedside to discuss ARU rec. SW provided education on the difference between TCU and ARU. Patient concerned about the location of ARU and distance his wife would need to travel. Patient declines ARU and states he would prefer a TCU, and specifically one closer to home. SW received pt choices. Per chart review, referrals pending.       Next Steps: TCU bed     RON Vazquez

## 2024-09-02 NOTE — PLAN OF CARE
"  Problem: Adult Inpatient Plan of Care  Goal: Plan of Care Review  Description: The Plan of Care Review/Shift note should be completed every shift.  The Outcome Evaluation is a brief statement about your assessment that the patient is improving, declining, or no change.  This information will be displayed automatically on your shift  note.  Outcome: Progressing  Goal: Patient-Specific Goal (Individualized)  Description: You can add care plan individualizations to a care plan. Examples of Individualization might be:  \"Parent requests to be called daily at 9am for status\", \"I have a hard time hearing out of my right ear\", or \"Do not touch me to wake me up as it startles  me\".  Outcome: Progressing  Goal: Absence of Hospital-Acquired Illness or Injury  Outcome: Progressing  Intervention: Identify and Manage Fall Risk  Recent Flowsheet Documentation  Taken 9/2/2024 0100 by Sarah Granados, RN  Safety Promotion/Fall Prevention:   lighting adjusted   safety round/check completed   room door open  Intervention: Prevent Skin Injury  Recent Flowsheet Documentation  Taken 9/2/2024 0100 by Sarah Granados RN  Body Position:   position changed independently   legs elevated  Intervention: Prevent Infection  Recent Flowsheet Documentation  Taken 9/2/2024 0100 by Sarah Granados, RN  Infection Prevention:   rest/sleep promoted   single patient room provided  Goal: Optimal Comfort and Wellbeing  Outcome: Progressing  Intervention: Provide Person-Centered Care  Recent Flowsheet Documentation  Taken 9/2/2024 0100 by Sarah Granados RN  Trust Relationship/Rapport: care explained  Goal: Readiness for Transition of Care  Outcome: Progressing   Goal Outcome Evaluation:       Pt a/o without c/o pain. Pt reports waking frequently but being able to go back to sleep. Blood sugar spot checked 101, pt given apple juice to prevent am hypoglycemia. Will monitor.                  "

## 2024-09-02 NOTE — PLAN OF CARE
Problem: Adult Inpatient Plan of Care  Goal: Optimal Comfort and Wellbeing  Outcome: Progressing   Goal Outcome Evaluation:         Pt. C/o pain this am PRN dilaudid given with relief, BG ac/hs, eating well, dressing changed today to RLE, uses urinal in bed, awaiting placement for discharge, cont to have loosed stools, PRN immodium given and started on scheduled metamucil, will cont to monitor.

## 2024-09-02 NOTE — PROGRESS NOTES
[facility]    Medicine Progress Note - Hospitalist Service    Date of Admission:  8/25/2024    Assessment & Plan   69-year-old male h/o lung transplant 2013, PAD, chronic limb threatening ischemia, COPD, DM2 and CKD admitted for RLE wound infection now s/p R BKA.  Postoperative course complicated by hypotension requiring vasopressors, respiratory failure and anemia requiring PRBC.    #RLE wound infection  #Peripheral arterial disease  S/p R BKA 8/28 Dr. Jay, vascular surgery  WCx: Stenotrophomonas, Enterococcus faecalis   ID consulted  s/p IV Vanc  Zosyn switched to Augmentin, continued Bactrim thru 9/4  ASA, Plavix, statin    #Acute respiratory failure with hypoxia  #Aspiration pneumonitis  Observed aspiration intraoperatively 8/19  HFNC weaned to NC, now weaned to RA  Abx as above  DuoNebs, home inhaler    #Hypovolemic, distributive shock  Postoperatively, adrenal insufficiency with chronic steroid use  Intensivist consulted and managed vasopressors, now signed off  S/p 1L LR, albumin  S/p vasopressors   S/p IV hydrocortisone  Florinef, Prednisone    #Acute blood loss anemia  s/p 2 units PRBC  Hgb stable     #Bilateral lung transplant 2013  Home prednisone, Florinef, Tacrolimus  PPx: Azithromycin, acyclovir, dapsone    #SHELLEY on CKD  #Lactic acidosis, resolved  s/p IV fluids  Monitor    #History of diabetes type 2  #Hyperglycemia due to chronic steroids  A1c 4.2  Stop Lantus 2/2 hypoglycemia  SSI    #CAD  #Hypertension  Increase Coreg, amlodipine  Holding Lasix  Resume Lisinopril at lower dose    #Severe malnutrition  #Intermittent chronic diarrhea  RD consulted and added Glucerna  Mag-Ox switched to Mag-gluconate  PRN imodium  Schedule Metamucil      DVT ppx: PCDs          Diet: Snacks/Supplements Adult: Gelatein Plus; Between Meals  Snacks/Supplements Adult: Glucerna; Between Meals  Moderate Consistent Carb (60 g CHO per Meal) Diet    [place]jo Catheter: Not present  Lines: PRESENT       PICC 08/29/24 Triple Lumen Left Brachial vein medial Vasopressor,Access-Site Assessment: WDL      Cardiac Monitoring: None  Code Status: Full Code      Clinically Significant Risk Factors              # Hypoalbuminemia: Lowest albumin = 2.8 g/dL at 8/27/2024  5:22 AM, will monitor as appropriate     # Hypertension: Noted on problem list            # Severe Malnutrition: based on nutrition assessment    # Financial/Environmental Concerns: none               Disposition Plan     Medically Ready for Discharge:              Fabián Walker DO  Hospitalist Service  [facility]  Securely message with [employer] (more info)  Text page via [employer] Paging/Directory   ______________________________________________________________________    Interval History   Patient reports diarrhea last night and this morning.    Physical Exam   Vital Signs: Temp: 98  F (36.7  C) Temp src: Oral BP: (!) 153/80 Pulse: 86   Resp: 18 SpO2: 95 % O2 Device: None (Room air)    Weight: 149 lbs 4.02 oz    General Appearance:  No acute distress  Respiratory: Clear to auscultation bilaterally  Cardiovascular: Regular rate and rhythm  Neuro: Alert and oriented x 3, normal speech      Medical Decision Making             Data

## 2024-09-02 NOTE — PLAN OF CARE
"  Problem: Adult Inpatient Plan of Care  Goal: Plan of Care Review  Description: The Plan of Care Review/Shift note should be completed every shift.  The Outcome Evaluation is a brief statement about your assessment that the patient is improving, declining, or no change.  This information will be displayed automatically on your shift  note.  Outcome: Progressing  Goal: Patient-Specific Goal (Individualized)  Description: You can add care plan individualizations to a care plan. Examples of Individualization might be:  \"Parent requests to be called daily at 9am for status\", \"I have a hard time hearing out of my right ear\", or \"Do not touch me to wake me up as it startles  me\".  Outcome: Progressing  Goal: Absence of Hospital-Acquired Illness or Injury  Outcome: Progressing  Intervention: Prevent Skin Injury  Recent Flowsheet Documentation  Taken 9/1/2024 2239 by David Baron RN  Body Position: turned  Taken 9/1/2024 2053 by David Baron RN  Body Position: turned  Taken 9/1/2024 1830 by David Baron RN  Body Position: turned  Taken 9/1/2024 1653 by David Baron RN  Body Position: supine  Intervention: Prevent Infection  Recent Flowsheet Documentation  Taken 9/1/2024 1613 by David Baron RN  Infection Prevention: hand hygiene promoted  Goal: Optimal Comfort and Wellbeing  Outcome: Progressing  Intervention: Provide Person-Centered Care  Recent Flowsheet Documentation  Taken 9/1/2024 1613 by David Baron RN  Trust Relationship/Rapport: care explained  Goal: Readiness for Transition of Care  Outcome: Progressing     Problem: Risk for Delirium  Goal: Optimal Coping  Outcome: Progressing  Goal: Improved Behavioral Control  Outcome: Progressing  Intervention: Minimize Safety Risk  Recent Flowsheet Documentation  Taken 9/1/2024 1613 by David Baron RN  Trust Relationship/Rapport: care explained  Goal: Improved Attention and Thought Clarity  Outcome: Progressing  Goal: Improved " Sleep  Outcome: Progressing     Problem: Infection  Goal: Absence of Infection Signs and Symptoms  Outcome: Progressing     Problem: Pain Acute  Goal: Optimal Pain Control and Function  Outcome: Progressing     Problem: Glycemic Control Impaired  Goal: Blood Glucose Level Within Targeted Range  Outcome: Progressing  Goal: Minimize Risk of Hypoglycemia  Outcome: Progressing     Problem: Acute Kidney Injury/Impairment  Goal: Fluid and Electrolyte Balance  Outcome: Progressing  Goal: Improved Oral Intake  Outcome: Progressing  Goal: Effective Renal Function  Outcome: Progressing     Problem: Hypertension Acute  Goal: Blood Pressure Within Desired Range  Outcome: Progressing     Problem: COPD (Chronic Obstructive Pulmonary Disease)  Goal: Optimal Chronic Illness Coping  Outcome: Progressing  Goal: Optimal Level of Functional [place]  Outcome: Progressing  Goal: Absence of Infection Signs and Symptoms  Outcome: Progressing  Goal: Improved Oral Intake  Outcome: Progressing  Goal: Effective Oxygenation and Ventilation  Outcome: Progressing  Intervention: Promote Airway Secretion Clearance  Recent Flowsheet Documentation  Taken 9/1/2024 1613 by David Baron RN  Cough And Deep Breathing: done independently per patient  Intervention: Optimize Oxygenation and Ventilation  Recent Flowsheet Documentation  Taken 9/1/2024 2239 by David Baron RN  Head of Bed (HOB) Positioning: HOB at 20-30 degrees  Taken 9/1/2024 2053 by David Baron RN  Head of Bed (HOB) Positioning: HOB at 20-30 degrees  Taken 9/1/2024 1830 by David Baron RN  Head of Bed (HOB) Positioning: HOB at 30-45 degrees  Taken 9/1/2024 1653 by David Baron RN  Head of Bed (HOB) Positioning: HOB at 30-45 degrees     Problem: Mobility Impairment  Goal: Optimal Mobility  Outcome: Progressing  Intervention: Optimize Mobility  Recent Flowsheet Documentation  Taken 9/1/2024 2239 by David Baron RN  Positioning/Transfer Devices:    pillows   in use  Taken 9/1/2024 2053 by David Baron RN  Positioning/Transfer Devices:   pillows   in use  Taken 9/1/2024 1830 by David Baron RN  Positioning/Transfer Devices:   pillows   in use  Taken 9/1/2024 1653 by Davdi Baron RN  Positioning/Transfer Devices:   pillows   in use     Problem: Diabetes  Goal: Optimal Coping  Outcome: Progressing  Goal: Optimal Functional Ability  Outcome: Progressing  Goal: Blood Glucose Level Within Target Range  Outcome: Progressing  Goal: Minimize Risk of Hypoglycemia  Outcome: Progressing   Goal Outcome Evaluation:         Pt is alert and oriented, able to make his needs known, Acetaminophen was given for pain and it was effective.

## 2024-09-03 ENCOUNTER — TELEPHONE (OUTPATIENT)
Dept: TRANSPLANT | Facility: CLINIC | Age: 69
End: 2024-09-03
Payer: MEDICARE

## 2024-09-03 ENCOUNTER — APPOINTMENT (OUTPATIENT)
Dept: PHYSICAL THERAPY | Facility: HOSPITAL | Age: 69
DRG: 856 | End: 2024-09-03
Payer: MEDICARE

## 2024-09-03 ENCOUNTER — APPOINTMENT (OUTPATIENT)
Dept: OCCUPATIONAL THERAPY | Facility: HOSPITAL | Age: 69
DRG: 856 | End: 2024-09-03
Payer: MEDICARE

## 2024-09-03 LAB
ANION GAP SERPL CALCULATED.3IONS-SCNC: 11 MMOL/L (ref 7–15)
BASOPHILS # BLD AUTO: 0 10E3/UL (ref 0–0.2)
BASOPHILS NFR BLD AUTO: 0 %
BUN SERPL-MCNC: 38.6 MG/DL (ref 8–23)
CALCIUM SERPL-MCNC: 8 MG/DL (ref 8.8–10.4)
CHLORIDE SERPL-SCNC: 104 MMOL/L (ref 98–107)
CREAT SERPL-MCNC: 1.12 MG/DL (ref 0.67–1.17)
EGFRCR SERPLBLD CKD-EPI 2021: 70 ML/MIN/1.73M2
EOSINOPHIL # BLD AUTO: 0.4 10E3/UL (ref 0–0.7)
EOSINOPHIL NFR BLD AUTO: 5 %
ERYTHROCYTE [DISTWIDTH] IN BLOOD BY AUTOMATED COUNT: 15.7 % (ref 10–15)
GLUCOSE BLDC GLUCOMTR-MCNC: 149 MG/DL (ref 70–99)
GLUCOSE BLDC GLUCOMTR-MCNC: 159 MG/DL (ref 70–99)
GLUCOSE BLDC GLUCOMTR-MCNC: 162 MG/DL (ref 70–99)
GLUCOSE BLDC GLUCOMTR-MCNC: 96 MG/DL (ref 70–99)
GLUCOSE BLDC GLUCOMTR-MCNC: 96 MG/DL (ref 70–99)
GLUCOSE SERPL-MCNC: 100 MG/DL (ref 70–99)
HCO3 SERPL-SCNC: 23 MMOL/L (ref 22–29)
HCT VFR BLD AUTO: 27.2 % (ref 40–53)
HGB BLD-MCNC: 8.7 G/DL (ref 13.3–17.7)
IMM GRANULOCYTES # BLD: 0.5 10E3/UL
IMM GRANULOCYTES NFR BLD: 5 %
LYMPHOCYTES # BLD AUTO: 3 10E3/UL (ref 0.8–5.3)
LYMPHOCYTES NFR BLD AUTO: 32 %
MCH RBC QN AUTO: 31.2 PG (ref 26.5–33)
MCHC RBC AUTO-ENTMCNC: 32 G/DL (ref 31.5–36.5)
MCV RBC AUTO: 98 FL (ref 78–100)
MONOCYTES # BLD AUTO: 1.3 10E3/UL (ref 0–1.3)
MONOCYTES NFR BLD AUTO: 14 %
NEUTROPHILS # BLD AUTO: 4.1 10E3/UL (ref 1.6–8.3)
NEUTROPHILS NFR BLD AUTO: 44 %
NRBC # BLD AUTO: 0 10E3/UL
NRBC BLD AUTO-RTO: 0 /100
PLATELET # BLD AUTO: 218 10E3/UL (ref 150–450)
POTASSIUM SERPL-SCNC: 3.9 MMOL/L (ref 3.4–5.3)
RBC # BLD AUTO: 2.79 10E6/UL (ref 4.4–5.9)
SODIUM SERPL-SCNC: 138 MMOL/L (ref 135–145)
TACROLIMUS BLD-MCNC: 12.1 UG/L (ref 5–15)
TME LAST DOSE: NORMAL H
TME LAST DOSE: NORMAL H
WBC # BLD AUTO: 9.3 10E3/UL (ref 4–11)

## 2024-09-03 PROCEDURE — 97116 GAIT TRAINING THERAPY: CPT | Mod: GP

## 2024-09-03 PROCEDURE — 88341 IMHCHEM/IMCYTCHM EA ADD ANTB: CPT | Mod: 26 | Performed by: PATHOLOGY

## 2024-09-03 PROCEDURE — 250N000013 HC RX MED GY IP 250 OP 250 PS 637: Performed by: HOSPITALIST

## 2024-09-03 PROCEDURE — 97535 SELF CARE MNGMENT TRAINING: CPT | Mod: GO

## 2024-09-03 PROCEDURE — 250N000013 HC RX MED GY IP 250 OP 250 PS 637: Performed by: STUDENT IN AN ORGANIZED HEALTH CARE EDUCATION/TRAINING PROGRAM

## 2024-09-03 PROCEDURE — 120N000001 HC R&B MED SURG/OB

## 2024-09-03 PROCEDURE — 80048 BASIC METABOLIC PNL TOTAL CA: CPT | Performed by: HOSPITALIST

## 2024-09-03 PROCEDURE — 88307 TISSUE EXAM BY PATHOLOGIST: CPT | Mod: 26 | Performed by: PATHOLOGY

## 2024-09-03 PROCEDURE — 250N000012 HC RX MED GY IP 250 OP 636 PS 637: Performed by: STUDENT IN AN ORGANIZED HEALTH CARE EDUCATION/TRAINING PROGRAM

## 2024-09-03 PROCEDURE — 97110 THERAPEUTIC EXERCISES: CPT | Mod: GO

## 2024-09-03 PROCEDURE — 99232 SBSQ HOSP IP/OBS MODERATE 35: CPT | Performed by: HOSPITALIST

## 2024-09-03 PROCEDURE — 88360 TUMOR IMMUNOHISTOCHEM/MANUAL: CPT | Mod: 26 | Performed by: PATHOLOGY

## 2024-09-03 PROCEDURE — 88342 IMHCHEM/IMCYTCHM 1ST ANTB: CPT | Mod: 26 | Performed by: PATHOLOGY

## 2024-09-03 PROCEDURE — 80197 ASSAY OF TACROLIMUS: CPT | Performed by: HOSPITALIST

## 2024-09-03 PROCEDURE — 250N000013 HC RX MED GY IP 250 OP 250 PS 637: Performed by: INTERNAL MEDICINE

## 2024-09-03 PROCEDURE — 88313 SPECIAL STAINS GROUP 2: CPT | Mod: 26 | Performed by: PATHOLOGY

## 2024-09-03 PROCEDURE — 85025 COMPLETE CBC W/AUTO DIFF WBC: CPT | Performed by: HOSPITALIST

## 2024-09-03 PROCEDURE — 97110 THERAPEUTIC EXERCISES: CPT | Mod: GP

## 2024-09-03 PROCEDURE — 88311 DECALCIFY TISSUE: CPT | Mod: 26 | Performed by: PATHOLOGY

## 2024-09-03 PROCEDURE — 250N000012 HC RX MED GY IP 250 OP 636 PS 637: Performed by: HOSPITALIST

## 2024-09-03 RX ORDER — CARVEDILOL 3.12 MG/1
3.12 TABLET ORAL 2 TIMES DAILY WITH MEALS
Status: DISCONTINUED | OUTPATIENT
Start: 2024-09-03 | End: 2024-09-04 | Stop reason: HOSPADM

## 2024-09-03 RX ORDER — FUROSEMIDE 20 MG
20 TABLET ORAL DAILY
Status: DISCONTINUED | OUTPATIENT
Start: 2024-09-03 | End: 2024-09-04 | Stop reason: HOSPADM

## 2024-09-03 RX ADMIN — ACETAMINOPHEN 650 MG: 325 TABLET ORAL at 21:27

## 2024-09-03 RX ADMIN — SULFAMETHOXAZOLE AND TRIMETHOPRIM 1 TABLET: 800; 160 TABLET ORAL at 08:49

## 2024-09-03 RX ADMIN — CARVEDILOL 3.12 MG: 3.12 TABLET, FILM COATED ORAL at 17:09

## 2024-09-03 RX ADMIN — PREDNISONE 2.5 MG: 2.5 TABLET ORAL at 20:23

## 2024-09-03 RX ADMIN — ACETAMINOPHEN 650 MG: 325 TABLET ORAL at 17:14

## 2024-09-03 RX ADMIN — FUROSEMIDE 20 MG: 20 TABLET ORAL at 11:50

## 2024-09-03 RX ADMIN — AMOXICILLIN AND CLAVULANATE POTASSIUM 1 TABLET: 875; 125 TABLET, FILM COATED ORAL at 20:17

## 2024-09-03 RX ADMIN — CLOPIDOGREL BISULFATE 75 MG: 75 TABLET ORAL at 08:50

## 2024-09-03 RX ADMIN — INSULIN ASPART 1 UNITS: 100 INJECTION, SOLUTION INTRAVENOUS; SUBCUTANEOUS at 17:11

## 2024-09-03 RX ADMIN — FLUTICASONE FUROATE AND VILANTEROL TRIFENATATE 1 PUFF: 100; 25 POWDER RESPIRATORY (INHALATION) at 08:48

## 2024-09-03 RX ADMIN — SULFAMETHOXAZOLE AND TRIMETHOPRIM 1 TABLET: 800; 160 TABLET ORAL at 20:17

## 2024-09-03 RX ADMIN — Medication 2 CAPSULE: at 08:52

## 2024-09-03 RX ADMIN — TACROLIMUS 3 MG: 1 CAPSULE ORAL at 08:49

## 2024-09-03 RX ADMIN — INSULIN ASPART 1 UNITS: 100 INJECTION, SOLUTION INTRAVENOUS; SUBCUTANEOUS at 11:51

## 2024-09-03 RX ADMIN — ASPIRIN 81 MG: 81 TABLET, COATED ORAL at 08:50

## 2024-09-03 RX ADMIN — Medication 60 ML: at 08:53

## 2024-09-03 RX ADMIN — ACYCLOVIR 400 MG: 400 TABLET ORAL at 20:16

## 2024-09-03 RX ADMIN — Medication 1 TABLET: at 17:09

## 2024-09-03 RX ADMIN — Medication 1 TABLET: at 08:50

## 2024-09-03 RX ADMIN — PREDNISONE 5 MG: 5 TABLET ORAL at 08:50

## 2024-09-03 RX ADMIN — FLUDROCORTISONE ACETATE 0.1 MG: 0.1 TABLET ORAL at 08:51

## 2024-09-03 RX ADMIN — DAPSONE 50 MG: 25 TABLET ORAL at 08:51

## 2024-09-03 RX ADMIN — Medication 2 CAPSULE: at 20:15

## 2024-09-03 RX ADMIN — PANTOPRAZOLE SODIUM 40 MG: 20 TABLET, DELAYED RELEASE ORAL at 08:49

## 2024-09-03 RX ADMIN — THERA TABS 1 TABLET: TAB at 08:50

## 2024-09-03 RX ADMIN — ACYCLOVIR 400 MG: 400 TABLET ORAL at 08:49

## 2024-09-03 RX ADMIN — ACETAMINOPHEN 650 MG: 325 TABLET ORAL at 11:51

## 2024-09-03 RX ADMIN — TACROLIMUS 2.5 MG: 0.5 CAPSULE ORAL at 20:22

## 2024-09-03 RX ADMIN — AMOXICILLIN AND CLAVULANATE POTASSIUM 1 TABLET: 875; 125 TABLET, FILM COATED ORAL at 08:49

## 2024-09-03 RX ADMIN — Medication 500 MG: at 20:17

## 2024-09-03 RX ADMIN — CARVEDILOL 3.12 MG: 3.12 TABLET, FILM COATED ORAL at 08:50

## 2024-09-03 ASSESSMENT — ACTIVITIES OF DAILY LIVING (ADL)
ADLS_ACUITY_SCORE: 40

## 2024-09-03 NOTE — PLAN OF CARE
Problem: Infection  Goal: Absence of Infection Signs and Symptoms  Outcome: Progressing     Problem: Pain Acute  Goal: Optimal Pain Control and Function  Outcome: Progressing     Problem: Glycemic Control Impaired  Goal: Blood Glucose Level Within Targeted Range  Outcome: Progressing     Problem: Glycemic Control Impaired  Goal: Minimize Risk of Hypoglycemia  Outcome: Progressing     Problem: Acute Kidney Injury/Impairment  Goal: Fluid and Electrolyte Balance  Outcome: Progressing     Problem: Hypertension Acute  Goal: Blood Pressure Within Desired Range  Outcome: Progressing     Problem: COPD (Chronic Obstructive Pulmonary Disease)  Goal: Effective Oxygenation and Ventilation  Outcome: Progressing     Problem: Mobility Impairment  Goal: Optimal Mobility  Outcome: Progressing   Goal Outcome Evaluation:    Patient slept between cares. Took prn tylenol with bedtime meds for RLE pain.dressing clean,dry, intact. Continues with po antibiotics. Uses urinal in bed. Had x1 soft stool in bedpan. Plan for TCU at discharge.

## 2024-09-03 NOTE — PROGRESS NOTES
"Care Management Follow Up    Length of Stay (days): 9    Expected Discharge Date: 09/04/2024    Anticipated Discharge Plan:  Transitional Care    Transportation: Anticipate Family/friend    PT Recommendations: Acute [facility]hab [facility]-Motivated patient will benefit from intensive, interdisciplinary therapy.  Anticipate will be able to tolerate 3 hours of therapy per day, Transitional Care Facility  OT Recommendations:  Acute Rehab Center-Motivated patient will benefit from intensive, interdisciplinary therapy.  Anticipate will be able to tolerate 3 hours of therapy per day     Barriers to Discharge: placement can accept tomorrow    Prior Living Situation: house (\"We live in a double wide. It has a ramp to get inside the house and then it is all 1 level inside. It is at [address] [place] [address].\" The facesheet lists a PO box for the mailing address.) with spouse, parent(s)    Discussed  Partnership in Safe Discharge Planning  document with patient/family: Yes: pt and wife agreeable to TCU     Handoff Completed: No, handoff not indicated or clinically appropriate    Patient/Spokesperson Updated: Yes. Who? Pt and spouse    Additional Information:  Pt accepted to [facility] for tomorrow, wife to transport to arrive before 5pm.    Next Steps: PAS and orders and transport    Sandra Silverman RN      "

## 2024-09-03 NOTE — TELEPHONE ENCOUNTER
Spoke with pharmacist at [facility].  Plan for pt to lower tacrolimus dose to 2.5 mg BID and repeat tac level this Friday.

## 2024-09-03 NOTE — PLAN OF CARE
"  Problem: Adult Inpatient Plan of Care  Goal: Plan of Care Review  Description: The Plan of Care Review/Shift note should be completed every shift.  The Outcome Evaluation is a brief statement about your assessment that the patient is improving, declining, or no change.  This information will be displayed automatically on your shift  note.  Outcome: Progressing  Flowsheets (Taken 9/3/2024 1028)  Plan of Care Reviewed With: patient  Goal: Patient-Specific Goal (Individualized)  Description: You can add care plan individualizations to a care plan. Examples of Individualization might be:  \"Parent requests to be called daily at 9am for status\", \"I have a hard time hearing out of my right ear\", or \"Do not touch me to wake me up as it startles  me\".  Outcome: Progressing  Goal: Absence of Hospital-Acquired Illness or Injury  Outcome: Progressing  Intervention: Identify and Manage Fall Risk  Recent Flowsheet Documentation  Taken 9/3/2024 0853 by Jeevan Joy RN  Safety Promotion/Fall Prevention:   activity supervised   clutter free environment maintained  Intervention: Prevent Skin Injury  Recent Flowsheet Documentation  Taken 9/3/2024 0853 by Jeevan Joy RN  Body Position:   heels elevated   supine  Intervention: Prevent Infection  Recent Flowsheet Documentation  Taken 9/3/2024 0853 by Jeevan Joy RN  Infection Prevention:   hand hygiene promoted   rest/sleep promoted   single patient room provided  Goal: Readiness for Transition of Care  Outcome: Progressing     Problem: Risk for Delirium  Goal: Improved Sleep  Outcome: Progressing     Problem: Infection  Goal: Absence of Infection Signs and Symptoms  Outcome: Progressing     Problem: Glycemic Control Impaired  Goal: Blood Glucose Level Within Targeted Range  Outcome: Progressing  Goal: Minimize Risk of Hypoglycemia  Outcome: Progressing     Problem: Acute Kidney Injury/Impairment  Goal: Fluid and Electrolyte Balance  Outcome: Progressing  Goal: Effective Renal " Function  Outcome: Progressing  Intervention: Monitor and Support Renal Function  Recent Flowsheet Documentation  Taken 9/3/2024 0853 by Jeevan Joy RN  Medication Review/Management: medications reviewed     Problem: Hypertension Acute  Goal: Blood Pressure Within Desired Range  Outcome: Progressing  Intervention: Normalize Blood Pressure  Recent Flowsheet Documentation  Taken 9/3/2024 0853 by Jeevan Joy RN  Medication Review/Management: medications reviewed     Problem: COPD (Chronic Obstructive Pulmonary Disease)  Goal: Optimal Level of Functional [place]  Outcome: Progressing  Intervention: Optimize Functional Ability  Recent Flowsheet Documentation  Taken 9/3/2024 0853 by Jeevan Joy RN  Activity Management: activity adjusted per tolerance  Environmental Support: calm environment promoted  Goal: Absence of Infection Signs and Symptoms  Outcome: Progressing  Goal: Effective Oxygenation and Ventilation  Outcome: Progressing  Intervention: Promote Airway Secretion Clearance  Recent Flowsheet Documentation  Taken 9/3/2024 0853 by Jeevan Joy RN  Activity Management: activity adjusted per tolerance  Intervention: Optimize Oxygenation and Ventilation  Recent Flowsheet Documentation  Taken 9/3/2024 0853 by Jeevan Joy RN  Head of Bed (HOB) Positioning: HOB at 20-30 degrees     Problem: Mobility Impairment  Goal: Optimal Mobility  Outcome: Progressing  Intervention: Optimize Mobility  Recent Flowsheet Documentation  Taken 9/3/2024 0853 by Jeevan Joy RN  Activity Management: activity adjusted per tolerance  Positioning/Transfer Devices:   in use   pillows     Problem: Diabetes  Goal: Blood Glucose Level Within Target Range  Outcome: Progressing  Goal: Minimize Risk of Hypoglycemia  Outcome: Progressing   Goal Outcome Evaluation:      Plan of Care Reviewed With: patient

## 2024-09-03 NOTE — PROGRESS NOTES
[facility]    Medicine Progress Note - Hospitalist Service    Date of Admission:  8/25/2024    Assessment & Plan   69-year-old male h/o lung transplant 2013, PAD, chronic limb threatening ischemia, COPD, DM2 and CKD admitted for RLE wound infection now s/p R BKA.  Postoperative course complicated by hypotension requiring vasopressors, respiratory failure and anemia requiring PRBC.    #RLE wound infection  #Peripheral arterial disease  S/p R BKA 8/28 Dr. Jay, vascular surgery  WCx: Stenotrophomonas, Enterococcus faecalis   ID consulted  s/p IV Vanc  Zosyn switched to Augmentin, continued Bactrim thru 9/4  ASA, Plavix, statin    #Acute respiratory failure with hypoxia  #Aspiration pneumonitis  Observed aspiration intraoperatively 8/19  HFNC weaned to NC, now weaned to RA  Abx as above  DuoNebs, home inhaler    #Hypovolemic, distributive shock  Postoperatively, adrenal insufficiency with chronic steroid use  Intensivist consulted and managed vasopressors, now signed off  S/p 1L LR, albumin  S/p vasopressors   S/p IV hydrocortisone  Florinef, Prednisone    #Acute blood loss anemia  s/p 2 units PRBC  Hgb trending down, monitor    #Bilateral lung transplant 2013  Home prednisone, Florinef, Tacrolimus  PPx: Azithromycin, acyclovir, dapsone    #SHELLEY on CKD  #Lactic acidosis, resolved  s/p IV fluids  Monitor    #History of diabetes type 2  #Hyperglycemia due to chronic steroids  A1c 4.2  Discontinued Lantus 2/2 hypoglycemia  SSI    #CAD  #Hypertension  Increase Coreg, amlodipine  Holding Lasix  Resume Lisinopril at lower dose    #Severe malnutrition  #Intermittent chronic diarrhea  RD consulted and added Glucerna  Mag-Ox switched to Mag-gluconate  PRN imodium  Scheduled Metamucil      DVT ppx: PCDs          Diet: Snacks/Supplements Adult: Gelatein Plus; Between Meals  Snacks/Supplements Adult: Glucerna; Between Meals  Moderate Consistent Carb (60 g CHO per Meal) Diet    Naranjo Catheter: Not  present  Lines: PRESENT      PICC 08/29/24 Triple Lumen Left Brachial vein medial Vasopressor,Access-Site Assessment: WDL      Cardiac Monitoring: None  Code Status: Full Code      Clinically Significant Risk Factors              # Hypoalbuminemia: Lowest albumin = 2.8 g/dL at 8/27/2024  5:22 AM, will monitor as appropriate     # Hypertension: Noted on problem list            # Severe Malnutrition: based on nutrition assessment    # Financial/Environmental Concerns: none               Disposition Plan     Medically Ready for Discharge: Anticipated Tomorrow             Fabián Walker DO  Hospitalist Service  [facility]  Securely message with [employer] (more info)  Text page via [employer] Paging/Directory   ______________________________________________________________________    Interval History   Diarrhea improved after starting metamucil.    Physical Exam   Vital Signs: Temp: 98.2  F (36.8  C) Temp src: Oral BP: 124/63 Pulse: 92   Resp: 18 SpO2: 95 % O2 Device: None (Room air)    Weight: 149 lbs 4.02 oz    General Appearance:  No acute distress  Respiratory: Clear to auscultation bilaterally  Cardiovascular: Regular rate and rhythm  Extremities: Trace bilateral upper extremity edema  Neuro: Alert and oriented x 3, normal speech    Medical Decision Making             Data

## 2024-09-03 NOTE — PROGRESS NOTES
"CLINICAL NUTRITION SERVICES - REASSESSMENT NOTE     Nutrition Prescription    RECOMMENDATIONS FOR MDs/PROVIDERS TO ORDER:  Recommend continue to adjust insulin regimen with hyperglycemia     Malnutrition Status:    Severe malnutrition In Context of:  Chronic illness or disease    Recommendations already ordered by Registered Dietitian (RD):  Discontinued Glucerna per pt request   Pt to order additional nutrition supplements PRN    Future/Additional Recommendations:  Monitor po intake, supplement gaurav., weight, labs, I/Os, wound healing.      EVALUATION OF THE PROGRESS TOWARD GOALS   Diet: Moderate Consistent Carbohydrate, OK for double protein portions at meals   Nutrition Supplement: Glucerna BID, Gel Plus daily, expedite bottle   Intake: Poor    8/31 50% breakfast, 100% dinner   9/1 100% x2 meals   9/2 100% dinner   Pt taking expedite bottle + Gel plus daily   Pt consuming some outside food, ate 50% [employer] sandwich + peaches and shortcake   Po 8/31-9/2 estimated 815-955 kcal and 37-56 g protein/day meeting <50% estimated calorie needs and <75% estimated protein needs     NEW FINDINGS   Met with pt, pt spouse at bedside this afternoon. Pt reports ongoing decreased appetite and decreased po intakes. Pt requesting RD to discontinue Glucerna, states supplement continues to \"go right through me.\" Discussed nutrition recommendations for diarrhea management, increased fluid/fiber po. Pt states bowel meds improving stool consistency this AM.     ANTHROPOMETRICS  Height: 172.7 cm (5' 8\")  Most Recent Weight: 67.7 kg (149 lb 4 oz)    Weight History: Current weight down, trending up from admit    Wt Readings from Last 10 Encounters:   08/27/24 67.7 kg (149 lb 4 oz)   08/20/24 65.3 kg (144 lb)   08/13/24 65.3 kg (144 lb)   08/07/24 68.2 kg (150 lb 6.4 oz)   08/02/24 69.4 kg (153 lb)   08/01/24 69.7 kg (153 lb 9.6 oz)   07/31/24 70.3 kg (155 lb)   07/12/24 74.5 kg (164 lb 3.2 oz)   06/28/24 75.8 kg (167 lb)   06/25/24 76 kg (167 " lb 9.6 oz)      Dosing Weight: 64.9 kg     ASSESSED NUTRITION NEEDS  Estimated Energy Needs: 1947+ kcals/day (30+ Hugo/Kg)  Justification: Repletion  Estimated Protein Needs: 78-97 grams protein/day (1.2 - 1.5 grams of pro/kg)  Justification: Wound healing  Estimated Fluid Needs: 1947 mL/day (30 ml/Kg)   Justification: Maintenance    PHYSICAL FINDINGS/GI CONCERNS  See malnutrition section below.  Per Flowhseets:  BM: x3 this AM  Surgical site, R-BKA   I/Os: -3.65 L this admit     LABS  Reviewed  BUN 38.6(H)   BG  last 24 hrs     MEDICATIONS  Reviewed  Scheduled zovirax, augmentin, zithromax, oscal, florinef, lasix, novolog, Mg gluconate, MVI, protonix, deltasone, metamucil, crestor, bactrim DS, expedite     MALNUTRITION  Malnutrition Diagnosis: Severe malnutrition  In Context of:  Chronic illness or disease    CURRENT NUTRITION DIAGNOSIS   Malnutrition related to acute on chronic illness as evidenced by 14.4% weight loss in the past 2 months, inadequate oral intake < 75%>/= 1 month and moderate subcutaneous fat and muscle loss   Evaluation: Ongoing     INTERVENTIONS  Implementation  Medical food supplement therapy  - Continue Gel Plus, Expedite bottle   - Discontinue Glucerna per pt request   - Pt to order additional nutrition supplements PRN    Discussed nutrition strategies for diarrhea management   Emphasized adequate oral intakes to meet nutrition needs    Recommend continue to adjust insulin regimen with hyperglycemia     Goals  Total avg nutritional intake to meet a minimum of 30 kcal/kg and 1.2+ g PRO/kg daily (per dosing wt 64.9 kg).- Not Met   Prevent further weight loss- Progressing, current wt up from admit   Wound healing- Progressing     Monitoring/Evaluation  Progress toward goals will be monitored and evaluated per protocol.

## 2024-09-03 NOTE — PROGRESS NOTES
ELIZABETH assisting with pre admission screening for discharge to transitional care. [id number] completed.     KAMI Krause at 3:20 PM on 09/03/24

## 2024-09-04 ENCOUNTER — APPOINTMENT (OUTPATIENT)
Dept: OCCUPATIONAL THERAPY | Facility: HOSPITAL | Age: 69
DRG: 856 | End: 2024-09-04
Payer: MEDICARE

## 2024-09-04 ENCOUNTER — APPOINTMENT (OUTPATIENT)
Dept: PHYSICAL THERAPY | Facility: HOSPITAL | Age: 69
DRG: 856 | End: 2024-09-04
Payer: MEDICARE

## 2024-09-04 ENCOUNTER — LAB REQUISITION (OUTPATIENT)
Dept: LAB | Facility: CLINIC | Age: 69
End: 2024-09-04
Payer: MEDICARE

## 2024-09-04 VITALS
RESPIRATION RATE: 18 BRPM | TEMPERATURE: 97.9 F | OXYGEN SATURATION: 95 % | BODY MASS INDEX: 22.62 KG/M2 | HEART RATE: 101 BPM | DIASTOLIC BLOOD PRESSURE: 78 MMHG | SYSTOLIC BLOOD PRESSURE: 145 MMHG | WEIGHT: 149.25 LBS | HEIGHT: 68 IN

## 2024-09-04 DIAGNOSIS — Z94.2 LUNG TRANSPLANT STATUS (H): ICD-10-CM

## 2024-09-04 LAB
ANION GAP SERPL CALCULATED.3IONS-SCNC: 12 MMOL/L (ref 7–15)
BUN SERPL-MCNC: 35.9 MG/DL (ref 8–23)
CALCIUM SERPL-MCNC: 8 MG/DL (ref 8.8–10.4)
CHLORIDE SERPL-SCNC: 106 MMOL/L (ref 98–107)
CREAT SERPL-MCNC: 1.02 MG/DL (ref 0.67–1.17)
EGFRCR SERPLBLD CKD-EPI 2021: 79 ML/MIN/1.73M2
ERYTHROCYTE [DISTWIDTH] IN BLOOD BY AUTOMATED COUNT: 15.7 % (ref 10–15)
GLUCOSE BLDC GLUCOMTR-MCNC: 103 MG/DL (ref 70–99)
GLUCOSE BLDC GLUCOMTR-MCNC: 123 MG/DL (ref 70–99)
GLUCOSE SERPL-MCNC: 101 MG/DL (ref 70–99)
HCO3 SERPL-SCNC: 23 MMOL/L (ref 22–29)
HCT VFR BLD AUTO: 28.9 % (ref 40–53)
HGB BLD-MCNC: 9 G/DL (ref 13.3–17.7)
MCH RBC QN AUTO: 30.6 PG (ref 26.5–33)
MCHC RBC AUTO-ENTMCNC: 31.1 G/DL (ref 31.5–36.5)
MCV RBC AUTO: 98 FL (ref 78–100)
PLATELET # BLD AUTO: 221 10E3/UL (ref 150–450)
POTASSIUM SERPL-SCNC: 3.8 MMOL/L (ref 3.4–5.3)
RBC # BLD AUTO: 2.94 10E6/UL (ref 4.4–5.9)
SODIUM SERPL-SCNC: 141 MMOL/L (ref 135–145)
WBC # BLD AUTO: 7 10E3/UL (ref 4–11)

## 2024-09-04 PROCEDURE — 250N000012 HC RX MED GY IP 250 OP 636 PS 637: Performed by: STUDENT IN AN ORGANIZED HEALTH CARE EDUCATION/TRAINING PROGRAM

## 2024-09-04 PROCEDURE — 99239 HOSP IP/OBS DSCHRG MGMT >30: CPT | Performed by: HOSPITALIST

## 2024-09-04 PROCEDURE — 97535 SELF CARE MNGMENT TRAINING: CPT | Mod: GO

## 2024-09-04 PROCEDURE — 97110 THERAPEUTIC EXERCISES: CPT | Mod: GO

## 2024-09-04 PROCEDURE — 250N000013 HC RX MED GY IP 250 OP 250 PS 637: Performed by: INTERNAL MEDICINE

## 2024-09-04 PROCEDURE — 250N000013 HC RX MED GY IP 250 OP 250 PS 637: Performed by: STUDENT IN AN ORGANIZED HEALTH CARE EDUCATION/TRAINING PROGRAM

## 2024-09-04 PROCEDURE — 250N000012 HC RX MED GY IP 250 OP 636 PS 637: Performed by: HOSPITALIST

## 2024-09-04 PROCEDURE — 85027 COMPLETE CBC AUTOMATED: CPT | Performed by: HOSPITALIST

## 2024-09-04 PROCEDURE — 80048 BASIC METABOLIC PNL TOTAL CA: CPT | Performed by: HOSPITALIST

## 2024-09-04 PROCEDURE — 97110 THERAPEUTIC EXERCISES: CPT | Mod: GP

## 2024-09-04 PROCEDURE — 250N000011 HC RX IP 250 OP 636: Performed by: STUDENT IN AN ORGANIZED HEALTH CARE EDUCATION/TRAINING PROGRAM

## 2024-09-04 PROCEDURE — 250N000013 HC RX MED GY IP 250 OP 250 PS 637: Performed by: HOSPITALIST

## 2024-09-04 RX ORDER — MAGNESIUM GLUCONATE 27 MG(500)
500 TABLET ORAL AT BEDTIME
DISCHARGE
Start: 2024-09-04

## 2024-09-04 RX ORDER — TACROLIMUS 0.5 MG/1
CAPSULE ORAL
DISCHARGE
Start: 2024-09-04 | End: 2024-09-16

## 2024-09-04 RX ORDER — HYDROMORPHONE HYDROCHLORIDE 2 MG/1
1 TABLET ORAL EVERY 6 HOURS PRN
Qty: 20 TABLET | Refills: 0 | Status: SHIPPED | OUTPATIENT
Start: 2024-09-04 | End: 2024-09-04

## 2024-09-04 RX ORDER — HYDROMORPHONE HYDROCHLORIDE 2 MG/1
1 TABLET ORAL EVERY 6 HOURS PRN
Qty: 20 TABLET | Refills: 0 | Status: ON HOLD | OUTPATIENT
Start: 2024-09-04 | End: 2024-10-01

## 2024-09-04 RX ADMIN — ASPIRIN 81 MG: 81 TABLET, COATED ORAL at 09:42

## 2024-09-04 RX ADMIN — AZITHROMYCIN DIHYDRATE 250 MG: 250 TABLET, FILM COATED ORAL at 09:43

## 2024-09-04 RX ADMIN — ACYCLOVIR 400 MG: 400 TABLET ORAL at 09:48

## 2024-09-04 RX ADMIN — ONDANSETRON 4 MG: 2 INJECTION INTRAMUSCULAR; INTRAVENOUS at 10:01

## 2024-09-04 RX ADMIN — FUROSEMIDE 20 MG: 20 TABLET ORAL at 09:43

## 2024-09-04 RX ADMIN — FLUDROCORTISONE ACETATE 0.1 MG: 0.1 TABLET ORAL at 09:44

## 2024-09-04 RX ADMIN — ACETAMINOPHEN 650 MG: 325 TABLET ORAL at 04:40

## 2024-09-04 RX ADMIN — SULFAMETHOXAZOLE AND TRIMETHOPRIM 1 TABLET: 800; 160 TABLET ORAL at 09:42

## 2024-09-04 RX ADMIN — TACROLIMUS 2.5 MG: 1 CAPSULE ORAL at 09:44

## 2024-09-04 RX ADMIN — PANTOPRAZOLE SODIUM 40 MG: 20 TABLET, DELAYED RELEASE ORAL at 09:42

## 2024-09-04 RX ADMIN — CLOPIDOGREL BISULFATE 75 MG: 75 TABLET ORAL at 09:49

## 2024-09-04 RX ADMIN — AMOXICILLIN AND CLAVULANATE POTASSIUM 1 TABLET: 875; 125 TABLET, FILM COATED ORAL at 09:42

## 2024-09-04 RX ADMIN — Medication 2 CAPSULE: at 09:47

## 2024-09-04 RX ADMIN — ROSUVASTATIN 20 MG: 10 TABLET, FILM COATED ORAL at 09:43

## 2024-09-04 RX ADMIN — THERA TABS 1 TABLET: TAB at 09:48

## 2024-09-04 RX ADMIN — FLUTICASONE FUROATE AND VILANTEROL TRIFENATATE 1 PUFF: 100; 25 POWDER RESPIRATORY (INHALATION) at 09:41

## 2024-09-04 RX ADMIN — DAPSONE 50 MG: 25 TABLET ORAL at 09:48

## 2024-09-04 RX ADMIN — Medication 1 TABLET: at 09:42

## 2024-09-04 RX ADMIN — CARVEDILOL 3.12 MG: 3.12 TABLET, FILM COATED ORAL at 09:43

## 2024-09-04 RX ADMIN — Medication 60 ML: at 09:40

## 2024-09-04 RX ADMIN — PREDNISONE 5 MG: 5 TABLET ORAL at 09:42

## 2024-09-04 ASSESSMENT — ACTIVITIES OF DAILY LIVING (ADL)
ADLS_ACUITY_SCORE: 39
ADLS_ACUITY_SCORE: 40
ADLS_ACUITY_SCORE: 39

## 2024-09-04 NOTE — PROGRESS NOTES
Care Management Discharge Note    Discharge Date: 09/04/2024       Discharge Disposition: [facility]    Discharge Services: Other (see comment) (therapy)    Discharge DME: None    Discharge Transportation: spouse    Private pay costs discussed: private room/amenity fees    Does the patient's insurance plan have a 3 day qualifying hospital stay waiver?  No    PAS Confirmation Code: [id number]  Patient/family educated on Medicare website which has current facility and service quality ratings: yes    Education Provided on the Discharge Plan: Yes  Persons Notified of Discharge Plans: pt and spouse  Patient/Family in Agreement with the Plan:      Handoff Referral Completed: No, handoff not indicated or clinically appropriate    Additional Information:  Pt will discharge to TCU via private vehicle at 1pm, today. Spouse to transport. PAS done. MD updated.    Sandra Silverman RN

## 2024-09-04 NOTE — PLAN OF CARE
Problem: COPD (Chronic Obstructive Pulmonary Disease)  Goal: Effective Oxygenation and Ventilation  Intervention: Optimize Oxygenation and Ventilation  Recent Flowsheet Documentation  Taken 9/4/2024 0108 by Ivanna Snyder RN  Head of Bed (HOB) Positioning: HOB at 20-30 degrees   Goal Outcome Evaluation: VSS on room air    Problem: Pain Acute  Goal: Optimal Pain Control and Function  Intervention: Prevent or Manage Pain  Recent Flowsheet Documentation  Taken 9/4/2024 0108 by Ivanna Snyder RN  Sensory Stimulation Regulation: care clustered  Medication Review/Management: medications reviewed  Intervention: Optimize Psychosocial Wellbeing  Recent Flowsheet Documentation  Taken 9/4/2024 0108 by Ivanna Snyder RN  Supportive Measures: active listening utilized  LLE pain controlled with prn Tylenol    0200 blood sugar=123. Patient slept on and off. No problems.

## 2024-09-04 NOTE — PLAN OF CARE
Problem: Adult Inpatient Plan of Care  Goal: Plan of Care Review  Description: The Plan of Care Review/Shift note should be completed every shift.  The Outcome Evaluation is a brief statement about your assessment that the patient is improving, declining, or no change.  This information will be displayed automatically on your shift  note.  Outcome: Progressing     Problem: Acute Kidney Injury/Impairment  Goal: Effective Renal Function  Intervention: Monitor and Support Renal Function  Recent Flowsheet Documentation  Taken 9/3/2024 1610 by Mirella Roy RN  Medication Review/Management: medications reviewed     Problem: Hypertension Acute  Goal: Blood Pressure Within Desired Range  Outcome: Progressing  Intervention: Normalize Blood Pressure  Recent Flowsheet Documentation  Taken 9/3/2024 1610 by Mirella Roy RN  Sensory Stimulation Regulation: care clustered  Medication Review/Management: medications reviewed     Problem: Mobility Impairment  Goal: Optimal Mobility  Outcome: Progressing  Intervention: Optimize Mobility  Recent Flowsheet Documentation  Taken 9/3/2024 1653 by Mirella Roy RN  Positioning/Transfer Devices:   pillows   in use  Taken 9/3/2024 1610 by Mirella Roy, RN  Assistive Device Utilized:   gait belt   walker  Activity Management: activity encouraged   Goal Outcome Evaluation:       AxO4. Vitally stable on RA. PRN tylenol given for pain - effective.  Stump protector on, dressing C/D/I.

## 2024-09-04 NOTE — PLAN OF CARE
Occupational Therapy Discharge Summary    Reason for therapy discharge:    Discharged to transitional care facility.    Progress towards therapy goal(s). See goals on Care Plan in [facility] electronic health record for goal details.  Goals not met.  Barriers to achieving goals:   discharge from facility.    Therapy recommendation(s):    Continued therapy is recommended.  Rationale/Recommendations:  recommend continued OT services at TCU.    Inessa Ponce OTR/MAURILIO 9/4/24

## 2024-09-04 NOTE — PROGRESS NOTES
Pt d/cing to TCU at this time with wife here to transport. Care plan goals met. Discharge packet  given to wife.

## 2024-09-04 NOTE — DISCHARGE SUMMARY
[facility]  Hospitalist Discharge Summary      Date of Admission:  8/25/2024  Date of Discharge:  9/4/2024  Discharging Provider: Fabián Walker DO  Discharge Service: Hospitalist Service    Discharge Diagnoses   RLE wound infection s/p R BKA  Peripheral arterial disease  Acute respiratory failure with hypoxia  Aspiration pneumonitis  Hypovolemic, distributive shock  Acute blood loss anemia  Bilateral lung transplant  Immunosuppressed  SHELLEY on CKD  Lactic acidosis  CAD  Hypertension  Severe malnutrition  Intermittent chronic diarrhea    Clinically Significant Risk Factors     # Severe Malnutrition: based on nutrition assessment      Follow-ups Needed After Discharge   Follow-up Appointments     Follow Up and recommended labs and tests      Follow up with skilled nursing physician.  The following labs/tests are   recommended: BMP, Mg, CBC.            Unresulted Labs Ordered in the Past 30 Days of this Admission       No orders found from 7/26/2024 to 8/26/2024.            Discharge Disposition   Discharged to short-term care facility  Condition at discharge: Stable    Hospital Course   69-year-old male h/o lung transplant 2013, PAD, chronic limb threatening ischemia, COPD, DM2 and CKD admitted for RLE wound infection now s/p R BKA.  Postoperative course complicated by hypotension requiring vasopressors, respiratory failure and anemia requiring PRBC.    #RLE wound infection  #Peripheral arterial disease  S/p R BKA 8/28 Dr. Jay, vascular surgery  WCx: Stenotrophomonas, Enterococcus faecalis   ID consulted  s/p IV Vanc  Zosyn switched to Augmentin, continued Bactrim and now completed abx course  ASA, Plavix, statin    #Acute respiratory failure with hypoxia  #Aspiration pneumonitis  Observed aspiration intraoperatively 8/19  HFNC weaned to NC, now weaned to RA  Abx as above  DuoNebs, home inhaler    #Hypovolemic, distributive shock  Postoperatively, adrenal insufficiency with chronic steroid  use  Intensivist consulted and managed vasopressors, now signed off  S/p 1L LR, albumin  S/p vasopressors   S/p IV hydrocortisone  Florinef, Prednisone    #Acute blood loss anemia  s/p 2 units PRBC  Stable    #Bilateral lung transplant 2013  Home prednisone, Florinef, Tacrolimus  Tac level high, Pharmacist decreased dose after discussion with transplant team  PPx: Azithromycin, acyclovir, dapsone    #SHELLEY on CKD  #Lactic acidosis, resolved  s/p IV fluids    #History of diabetes type 2  #Hyperglycemia due to chronic steroids  A1c 4.2  Discontinued Lantus 2/2 hypoglycemia  No further need for insulin    #CAD  #Hypertension  Home coreg, lasix  Continue holding ACE/ARB, amlodipine for intermittent soft BP    #Severe malnutrition  #Intermittent chronic diarrhea  RD consulted and added Glucerna  Mag-Ox switched to Mag-gluconate  PRN imodium  Scheduled Metamucil      Consultations This Hospital Stay   PHARMACY TO DOSE VANCO  INFECTIOUS DISEASES IP CONSULT  WOUND OSTOMY CONTINENCE NURSE  IP CONSULT  VASCULAR SURGERY IP CONSULT  CARE MANAGEMENT / SOCIAL WORK IP CONSULT  WOUND OSTOMY CONTINENCE NURSE  IP CONSULT  VASCULAR ACCESS ADULT IP CONSULT  PHYSICAL THERAPY ADULT IP CONSULT  OCCUPATIONAL THERAPY ADULT IP CONSULT  CARE MANAGEMENT / SOCIAL WORK IP CONSULT  PHYSICAL THERAPY ADULT IP CONSULT  OCCUPATIONAL THERAPY ADULT IP CONSULT    Code Status   Full Code    Time Spent on this Encounter   IFabián DO, personally saw the patient today and spent greater than 30 minutes discharging this patient.       Fabián Walker DO  [address] [address]  Phone: [phone]  Fax: [phone]  ______________________________________________________________________    Physical Exam   Vital Signs: Temp: 97.9  F (36.6  C) Temp src: Oral BP: (!) 145/78 Pulse: 101   Resp: 18 SpO2: 95 % O2 Device: None (Room air)    Weight: 149 lbs 4.02 oz  General Appearance:  No acute  distress  Respiratory: Nonlabored breathing  Extremities: No peripheral edema or cyanosis  Neuro: Alert and oriented x 3, normal speech       Primary Care Physician   Hoa Olivarez    Discharge Orders      Activity - Up with nursing assistance    Nonweightbearing to right leg     Wound care    Site:   Right below-knee amputation wound  Instructions: Place Xeroform strip over wound.  Cover with gauze fluffs, ABD pad, secure with Kerlix wrap and Ace wrap.  Change every other day or more as needed.  Okay to shower and pat dry.  Do not soak the wound- no hot tubs, tub baths, or swimming pools.     General info for SNF    Length of Stay Estimate: Short Term Care: Estimated # of Days <30  Condition at Discharge: Stable  Level of care:skilled   Rehabilitation Potential: Good  Admission H&P remains valid and up-to-date: Yes  Recent Chemotherapy: N/A  Use Nursing Home Standing Orders: Yes     Mantoux instructions    Give two-step Mantoux (PPD) Per Facility Policy Yes     Follow Up and recommended labs and tests    Follow up with care home physician.  The following labs/tests are recommended: BMP, Mg, CBC.     Reason for your hospital stay    PAD s/p BKA     Glucose monitor nursing POCT    Before meals and at bedtime     Activity - Up with assistive device     Full Code     Physical Therapy Adult Consult    Evaluate and treat as clinically indicated.    Reason:  BKA     Occupational Therapy Adult Consult    Evaluate and treat as clinically indicated.    Reason:  BKA     Fall precautions     Diet    Follow this diet upon discharge: Current Diet:Orders Placed This Encounter      Snacks/Supplements Adult: Gelatein Plus; Between Meals      Moderate Consistent Carb (60 g CHO per Meal) Diet       Significant Results and Procedures   Most Recent 3 CBC's:  Recent Labs   Lab Test 09/04/24  0554 09/03/24  0638 09/01/24  0623 08/31/24  0616   WBC 7.0 9.3  --  9.9   HGB 9.0* 8.7* 9.7* 10.1*   MCV 98 98  --  95    218  --  223  "    Most Recent 3 BMP's:  Recent Labs   Lab Test 09/04/24  0826 09/04/24  0554 09/04/24  0202 09/03/24  0744 09/03/24  0638 09/01/24  0728 09/01/24  0623   NA  --  141  --   --  138  --  144   POTASSIUM  --  3.8  --   --  3.9  --  3.9   CHLORIDE  --  106  --   --  104  --  109*   CO2  --  23  --   --  23  --  25   BUN  --  35.9*  --   --  38.6*  --  32.7*   CR  --  1.02  --   --  1.12  --  0.93   ANIONGAP  --  12  --   --  11  --  10   ERIN  --  8.0*  --   --  8.0*  --  8.1*   * 101* 123*   < > 100*   < > 67*    < > = values in this interval not displayed.     Most Recent 3 INR's:  Recent Labs   Lab Test 03/21/24  0946 02/16/24  0758 01/09/24  1012   INR 1.03 0.99 1.03     Most Recent 3 Hemoglobins:  Recent Labs   Lab Test 09/04/24  0554 09/03/24  0638 09/01/24  0623   HGB 9.0* 8.7* 9.7*     Most Recent Hemoglobin A1c:  Recent Labs   Lab Test 08/25/24  1632   A1C 4.2   ,   Results for orders placed or performed during the hospital encounter of 08/25/24   POC US Guidance Needle Placement    Narrative    Ultrasound was performed as guidance to an anesthesia procedure.  Click   \"PACS images\" hyperlink below to view any stored images.  For specific   procedure details, view procedure note authored by anesthesia.   XR Chest Port 1 View    Narrative    EXAM: XR CHEST PORT 1 VIEW  LOCATION: [facility]  DATE: 8/28/2024    INDICATION: Postoperative desaturation; clinical suspicion of aspiration.  COMPARISON: 5/16/2024.      Impression    IMPRESSION: There are new patchy nodular airspace opacities in the right mid to lower lung, possibly representing infectious/inflammatory infiltrate. Short-term imaging follow-up to resolution is recommended.    Unchanged scarring within the left lung base.    No pleural effusion or pneumothorax.    Unchanged cardiomegaly. Transverse sternotomy wires. Atherosclerotic calcifications of the thoracic aorta.   XR Chest Port 1 View    Narrative    EXAM: XR CHEST " PORT 1 VIEW  LOCATION: [facility]  DATE: 8/29/2024    INDICATION: Postoperative desaturation; clinical suspicion of aspiration  COMPARISON: Portable AP view the chest 8/28/2024      Impression    IMPRESSION:     Left PICC terminates in the middle third of the SVC. Clamshell sternal wires are present. Mild atheromatous aortic calcifications. Cardiac silhouette is normal in size.    Shallow lung inflation. Heterogeneous opacities are present within the medial lower lobes including some angular components consistent with atelectasis. An underlying airspace inflammatory process could also be present. Diaphragmatic curvature is   preserved. No visible pleural fluid. No pneumothorax.   CT Chest w/o Contrast    Narrative    EXAM: CT CHEST W/O CONTRAST  LOCATION: [facility]  DATE: 8/30/2024    INDICATION: Hypoxia, post op. Hx of lung transplant.  COMPARISON: PET/CT 6/26/2024.  TECHNIQUE: CT chest without IV contrast. Multiplanar reformats were obtained. Dose reduction techniques were used.  CONTRAST: None.    FINDINGS:   LUNGS AND PLEURA: Post lung transplant. New since 6/26/2024 PET/CT, moderate right lower lobe predominant heterogeneous consolidation. Additional small burden of groundglass and opacities, as well as centrilobular groundglass micronodules in all other   lobes bilaterally. Minimal retained airway secretions. Scant right and tiny left pleural effusions. No pneumothorax.    MEDIASTINUM/AXILLAE: Left PICC tip at the distal SVC. Nonaneurysmal aorta. Prior sternotomy. No adenopathy. Small hiatus hernia.    CORONARY ARTERY CALCIFICATION: Severe.    UPPER ABDOMEN: Mild hepatic steatosis. Stable cyst along the proximal pancreas.    MUSCULOSKELETAL: Nothing acute.      Impression    IMPRESSION:     1.  New moderate right lower lobe predominant consolidation, compatible with infectious / inflammatory change. Additional small burden of opacities and centrilobular  groundglass micronodules in all other lobes bilaterally. Recommend follow-up to   resolution to exclude other etiology.    2.  Scant right and tiny left pleural effusions.    3.  Post lung transplant.       *Note: Due to a large number of results and/or encounters for the requested time period, some results have not been displayed. A complete set of results can be found in Results Review.       Discharge Medications   Current Discharge Medication List        START taking these medications    Details   magnesium gluconate (MAGONATE) 500 MG tablet Take 1 tablet (500 mg) by mouth at bedtime.    Associated Diagnoses: Hypomagnesemia      psyllium (METAMUCIL/KONSYL) capsule Take 2 capsules by mouth 2 times daily.    Comments: Pharmacy to dispense capsule size that is available.  Associated Diagnoses: Diarrhea, unspecified type      wound support modular (EXPEDITE) LIQD bottle Take 60 mLs by mouth daily.    Associated Diagnoses: PAD (peripheral artery disease) (H24)           CONTINUE these medications which have CHANGED    Details   HYDROmorphone (DILAUDID) 2 MG tablet Take 0.5 tablets (1 mg) by mouth every 6 hours as needed for severe pain.  Qty: 20 tablet, Refills: 0    Associated Diagnoses: PAD (peripheral artery disease) (H24); Right foot pain; Open wound of toe, subsequent encounter      tacrolimus (GENERIC EQUIVALENT) 0.5 MG capsule Take 5 capsules (2.5 mg) by mouth every morning AND 5 capsules (2.5 mg) every evening.    Associated Diagnoses: S/P lung transplant (H)           CONTINUE these medications which have NOT CHANGED    Details   acetaminophen (TYLENOL) 325 MG tablet Take 2 tablets (650 mg) by mouth every 6 hours as needed for mild pain  Qty: 60 tablet, Refills: 0    Associated Diagnoses: Intermittent claudication due to atherosclerosis of artery of extremity (H24)      acyclovir (ZOVIRAX) 400 MG tablet TAKE 1 TABLET (400 MG) BY MOUTH 2 TIMES DAILY  Qty: 60 tablet, Refills: 3    Comments: We are filling last  refill today and the patient is requesting authorization to refill once that supply has been used. Thank you!  Associated Diagnoses: PTLD (post-transplant lymphoproliferative disorder) (H)      albuterol (PROAIR HFA/PROVENTIL HFA/VENTOLIN HFA) 108 (90 Base) MCG/ACT inhaler Inhale 1-2 puffs into the lungs every 6 hours as needed for shortness of breath or wheezing  Qty: 8.5 g, Refills: 3    Comments: Pharmacy may dispense brand covered by insurance (Proair, or proventil or ventolin or generic albuterol inhaler)  Associated Diagnoses: Wheezing      aspirin (ASA) 81 MG EC tablet Take 1 tablet (81 mg) by mouth daily  Qty: 90 tablet, Refills: 3    Associated Diagnoses: Claudication (H24)      azithromycin (ZITHROMAX) 250 MG tablet Take 250 mg by mouth Every Mon, Wed, Fri Morning      Calcium Carbonate-Vitamin D 600-10 MG-MCG TABS Take 1 tablet by mouth 2 times daily (with meals)  Qty: 60 tablet, Refills: 11    Associated Diagnoses: Lung transplant status, bilateral (H)      carvedilol (COREG) 6.25 MG tablet TAKE 1 TABLET (6.25 MG) BY MOUTH 2 TIMES DAILY (WITH MEALS)  Qty: 120 tablet, Refills: 3    Associated Diagnoses: Essential hypertension      clopidogrel (PLAVIX) 75 MG tablet Take 1 tablet (75 mg) by mouth daily  Qty: 90 tablet, Refills: 3    Associated Diagnoses: PAD (peripheral artery disease) (H24); Intermittent claudication due to atherosclerosis of artery of extremity (H24)      COPPER PO Take 1 tablet by mouth daily.      dapsone (ACZONE) 25 MG tablet Take 2 tablets (50 mg) by mouth daily  Qty: 180 tablet, Refills: 3    Associated Diagnoses: Lung replaced by transplant (H)      diclofenac (VOLTAREN) 1 % topical gel Apply 2 g topically 2 times daily as needed for moderate pain      econazole nitrate 1 % external cream APPLY TOPICALLY DAILY TO FEET AND HEELS.  Qty: 85 g, Refills: 3    Associated Diagnoses: Tinea pedis of both feet; Diabetes mellitus with peripheral vascular disease (H); Type II or unspecified  type diabetes mellitus with neurological manifestations, not stated as uncontrolled(250.60) (H); Skin fissure      Ferrous Sulfate Dried (HIGH POTENCY IRON) 65 MG TABS Take 1 tablet by mouth every morning.      fludrocortisone (FLORINEF) 0.1 MG tablet Take 1 tablet (0.1 mg) by mouth daily  Qty: 90 tablet, Refills: 3    Associated Diagnoses: Lung replaced by transplant (H); Hyperkalemia      fluticasone-salmeterol (ADVAIR-HFA) 230-21 MCG/ACT inhaler Inhale 2 puffs into the lungs 2 times daily  Qty: 8 g, Refills: 11    Associated Diagnoses: S/P lung transplant (H); Chronic rejection of allograft lung (H)      furosemide (LASIX) 20 MG tablet Take 1 tablet (20 mg) by mouth daily  Qty: 90 tablet, Refills: 4    Associated Diagnoses: Essential hypertension      insulin aspart (NOVOLOG PEN) 100 UNIT/ML pen Take 5 U am, 3 unit(s) non, 5 unit(s) pm of insulin within 30 minutes of start of breakfast, lunch, and dinner. Do not give if blood sugar is less than 70 mg/dl.      insulin glargine (LANTUS PEN) 100 UNIT/ML pen Inject 18 Units Subcutaneous every morning (before breakfast)    Comments: If Lantus is not covered by insurance, may substitute Basaglar or Semglee or other insulin glargine product per insurance preference at same dose and frequency.    Associated Diagnoses: Diabetes mellitus type 2, diet-controlled (H)      loperamide (IMODIUM) 2 MG capsule Take 1 capsule (2 mg) by mouth 4 times daily as needed for diarrhea  Qty: 120 capsule, Refills: 12    Associated Diagnoses: Lung transplant status, bilateral (H)      metoclopramide (REGLAN) 5 MG tablet Take 1 tablet (5 mg) by mouth every 6 hours as needed (nausea)  Qty: 30 tablet, Refills: 0    Associated Diagnoses: Nausea; Diffuse large B-cell lymphoma of solid organ excluding spleen (H)      montelukast (SINGULAIR) 10 MG tablet Take 1 tablet (10 mg) by mouth every evening  Qty: 90 tablet, Refills: 3    Associated Diagnoses: Lung replaced by transplant (H)       multivitamin, therapeutic (THERA-VIT) TABS Take 1 tablet by mouth daily  Qty: 30 tablet, Refills: 12    Associated Diagnoses: Lung transplant status, bilateral (H)      omeprazole 20 MG tablet Take 20 mg by mouth 2 times daily.      pantoprazole (PROTONIX) 40 MG EC tablet Take 1 tablet (40 mg) by mouth daily  Qty: 90 tablet, Refills: 1    Associated Diagnoses: Gastroesophageal reflux disease, unspecified whether esophagitis present      !! predniSONE (DELTASONE) 5 MG tablet Take 5 mg by mouth every morning. In addition, take one half tablet (2.5 mg) in the evening      !! predniSONE (DELTASONE) 5 MG tablet Take 2.5 mg by mouth every evening. In addition, take 1 tablet (5 mg) in the morning.      rosuvastatin (CRESTOR) 40 MG tablet Take 20 mg by mouth Every Mon, Wed, Fri Morning      blood glucose (NO BRAND SPECIFIED) test strip USE TO TEST BLOOD SUGAR 3 TIMES DAILY. DIAG CODE: E11.9  Qty: 300 strip, Refills: 3    Associated Diagnoses: Type 2 diabetes mellitus with diabetic polyneuropathy, with long-term current use of insulin (H)      insulin pen needle (32G X 4 MM) 32G X 4 MM miscellaneous Use 4 pen needles daily or as directed. Dispense as insurance allows. Dx. Code: E09.9  Qty: 400 each, Refills: 11    Associated Diagnoses: Diabetes mellitus type 2, diet-controlled (H)      Microlet Lancets MISC CHECK BLOOD SUGAR FOUR TIMES DAILY E11.9  Qty: 400 each, Refills: 1    Comments: Pt states he tests3 times a day. Testing above 3 times a dayrequires extra documentation to bill MEDICARE. Might you be willing to send a new RX for TID testing? PLEASE AND THANKS!  Associated Diagnoses: Diabetes mellitus type 2, diet-controlled (H)      order for DME Equipment being ordered: diabetic shoes  Qty: 1 each, Refills: 0    Associated Diagnoses: Diabetes mellitus type 2, diet-controlled (H)       !! - Potential duplicate medications found. Please discuss with provider.        STOP taking these medications       amLODIPine  "(NORVASC) 10 MG tablet Comments:   Reason for Stopping:         lisinopril (ZESTRIL) 40 MG tablet Comments:   Reason for Stopping:         magnesium oxide (MAG-OX) 400 MG tablet Comments:   Reason for Stopping:         sildenafil (VIAGRA) 25 MG tablet Comments:   Reason for Stopping:             Allergies   Allergies   Allergen Reactions    Heparin Other (See Comments)     HIT positive and AUGUST positive    No Heparin Antibody Identified on 8/15 blood test    Oxycodone Other (See Comments)     Significant lethargy, confusion. Tolerates dilaudid well.     Fluocinolone Other (See Comments)     Tendon problems      Gabapentin Nausea and Vomiting    Levaquin Muscle Pain (Myalgia)    Pneumococcal Vaccine Other (See Comments)     Other reaction(s): Fever  \"My arm swelled up like a balloon.\"    Varicella Zoster Immune Globulin Swelling     "

## 2024-09-04 NOTE — PLAN OF CARE
Physical Therapy Discharge Summary    Reason for therapy discharge:    Discharged to transitional care facility.    Progress towards therapy goal(s). See goals on Care Plan in [facility] electronic health record for goal details.  Goals not met.  Barriers to achieving goals:   Pt is working on the goals yet.  .    Therapy recommendation(s):    Continued therapy is recommended.  Rationale/Recommendations:  ARU recommended.  Pt to go to TCU for continued PT to increased strength and mobility.  Pt plans to try to use a prothesis.   .

## 2024-09-05 ENCOUNTER — LAB REQUISITION (OUTPATIENT)
Dept: LAB | Facility: CLINIC | Age: 69
End: 2024-09-05
Payer: MEDICARE

## 2024-09-05 ENCOUNTER — TRANSITIONAL CARE UNIT VISIT (OUTPATIENT)
Dept: GERIATRICS | Facility: CLINIC | Age: 69
End: 2024-09-05
Payer: MEDICARE

## 2024-09-05 VITALS
HEART RATE: 105 BPM | TEMPERATURE: 97.7 F | DIASTOLIC BLOOD PRESSURE: 79 MMHG | RESPIRATION RATE: 16 BRPM | OXYGEN SATURATION: 96 % | SYSTOLIC BLOOD PRESSURE: 141 MMHG | WEIGHT: 136.6 LBS | HEIGHT: 68 IN | BODY MASS INDEX: 20.7 KG/M2

## 2024-09-05 DIAGNOSIS — Z79.4 TYPE 2 DIABETES MELLITUS WITH DIABETIC POLYNEUROPATHY, WITH LONG-TERM CURRENT USE OF INSULIN (H): ICD-10-CM

## 2024-09-05 DIAGNOSIS — N18.32 CHRONIC KIDNEY DISEASE, STAGE 3B (H): ICD-10-CM

## 2024-09-05 DIAGNOSIS — Z89.511 STATUS POST BELOW-KNEE AMPUTATION OF RIGHT LOWER EXTREMITY (H): Primary | ICD-10-CM

## 2024-09-05 DIAGNOSIS — E11.42 TYPE 2 DIABETES MELLITUS WITH DIABETIC POLYNEUROPATHY, WITH LONG-TERM CURRENT USE OF INSULIN (H): ICD-10-CM

## 2024-09-05 DIAGNOSIS — N50.9 DISORDER OF MALE GENITAL ORGANS, UNSPECIFIED: ICD-10-CM

## 2024-09-05 DIAGNOSIS — I73.9 PAD (PERIPHERAL ARTERY DISEASE) (H): ICD-10-CM

## 2024-09-05 DIAGNOSIS — Z94.2 S/P LUNG TRANSPLANT (H): Chronic | ICD-10-CM

## 2024-09-05 DIAGNOSIS — F32.1 MAJOR DEPRESSIVE DISORDER, SINGLE EPISODE, MODERATE (H): ICD-10-CM

## 2024-09-05 PROCEDURE — 99310 SBSQ NF CARE HIGH MDM 45: CPT | Performed by: NURSE PRACTITIONER

## 2024-09-05 NOTE — PROGRESS NOTES
[facility]  PRIMARY CARE PROVIDER AND CLINIC:  BRANDI Manning CNP, [address] / [place] [address]  Chief Complaint   Patient presents with    Hospital F/U     [facility] Record Number:  [id number]  Place of Service where encounter took place:  [facility] (TCU/SNF) [[id number]]    Aubrey Duncan  is a 69 year old  (1955), admitted to the above facility from  [facility]. Hospital stay 8/25/24 through 9/4/24..  Admitted to this facility for  rehab, medical management, and nursing care.  Patient's living condition: lives with spouse    HPI:    HPI information obtained from: facility chart records, facility staff, patient report, [facility] chart review, and Care Everywhere [facility] chart review.     HPI:    Brief Summary of Hospital Course: Admitted for a right lower extremity wound infection.  Required right BKA on 8/28 due to peripheral arterial disease.  Hospital course complicated by acute respiratory failure and aspiration pneumonitis, acute blood loss anemia requiring 2 units of packed red blood cells.  Past medical history of bilateral lung transplant in 2013 with immunosuppression.  CAD, HTN, chronic diarrhea, DM2 on insulin.  MEDICATION CHANGES: Start magnesium gluconate, Metamucil, expedite.  Changed Dilaudid and tacrolimus.  Stop amlodipine, lisinopril, Mag-Ox, sildenafil.  RECOMMENDED FOLLOW UP: Vascular surgery as directed  Updates on Status Since Skilled nursing Admission: None    Today: Nurse request clarification of medications and diagnoses for medications.  Patient reports no concerns.  Pain is minimal and not using the narcotics.  On review of discharge summary it seemed that the insulin was stopped but then resumed unclear.  For now his blood sugars are stable on his current regimen will to follow closely.  He denies problems with constipation, trouble sleeping.     External notes reviewed: Facility EHR including  "progress notes, vital signs. Hospital discharge summary reviewed. Hospital discharge orders reviewed.  POLST reviewed and signed    CODE STATUS/ADVANCE DIRECTIVES DISCUSSION: Full code    ALLERGIES:   Allergies   Allergen Reactions    Heparin Other (See Comments)     HIT positive and AUGUST positive    No Heparin Antibody Identified on 8/15 blood test    Oxycodone Other (See Comments)     Significant lethargy, confusion. Tolerates dilaudid well.     Fluocinolone Other (See Comments)     Tendon problems      Gabapentin Nausea and Vomiting    Levaquin Muscle Pain (Myalgia)    Pneumococcal Vaccine Other (See Comments)     Other reaction(s): Fever  \"My arm swelled up like a balloon.\"    Varicella Zoster Immune Globulin Swelling      PAST MEDICAL HISTORY:   Past Medical History:   Diagnosis Date    Acute postoperative pain 09/11/2013    Alpha-1-antitrypsin deficiency (H)     Arthritis     Basal cell carcinoma     CMV (cytomegalovirus infection) (H)     Reacttivation Sept 2013 when valcyte held    Coronary artery disease     DVT of upper extremity (deep vein thrombosis) (H) 09/2013    Nonocclusive thrombosis extending from the right subclavian vein to the right axillary vein,  Segmental occlusion of right basilic vein in the upper arm. 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    With DVT and thrombocytopenia    Hypertension     Lung transplant status, bilateral (H) 09/08/2013    Complicated by HIT and esophageal dysfunction    Pneumonia of right lower lobe due to Pseudomonas species (H) 02/28/2019    Sepsis associated hypotension (H) 02/24/2019    Squamous cell carcinoma     Stented coronary artery     Steroid-induced diabetes mellitus (H24)     Thrombocytopaenia     due to HIT    Ureteral stone 10/17/2017      PAST SURGICAL HISTORY:   has a " past surgical history that includes tonsillectomy; Repair iris (1970); cataract iol, rt/lt; ESOPH/GAS REFLUX TEST W NASAL IMPED >1 HR (08/02/2012); Transplant lung recipient single x2 (09/08/2013); Esophagoscopy, gastroscopy, duodenoscopy (EGD), combined (09/12/2013); Bronchoscopy flexible and rigid (09/17/2013); Esophagoscopy, gastroscopy, duodenoscopy (EGD), dilatation, combined (11/06/2013); Mohs micrographic procedure; picc insertion (Left, 09/22/2014); Esophagoscopy, gastroscopy, duodenoscopy (EGD), combined; Esophagoscopy, gastroscopy, duodenoscopy (EGD), combined (N/A, 12/07/2015); Cystoscopy, retrogrades, insert stent ureter(s), combined (Left, 10/18/2017); Laser holmium lithotripsy ureter(s), insert stent, combined (Left, 11/09/2017); Lung surgery; colonoscopy (08/17/2018); IR OR Angiogram (08/16/2022); Angiogram (Bilateral, 08/16/2022); Endoscopic ultrasound upper gastrointestinal tract (GI) (N/A, 07/10/2023); Colonoscopy (N/A, 09/28/2023); Coronary Angiogram (N/A, 1/11/2024); Percutaneous Coronary Intervention (N/A, 1/11/2024); Percutaneous Coronary Intervention (N/A, 2/16/2024); Coronary Angiogram (N/A, 2/16/2024); Endoscopic ultrasound upper gastrointestinal tract (GI) (N/A, 6/5/2024); Femoral Artery - Tibial Artery Bypass Graft (Right, 8/1/2024); IR Lower Extremity Angiogram Bilateral (7/9/2024); PICC/Midline Placement (8/29/2024); and Amputate leg below knee (Right, 8/28/2024).  FAMILY HISTORY: family history includes Asthma in his mother and sister; C.A.D. in his mother; Cerebrovascular Disease in his father; Diabetes in his sister; Heart Failure in his mother; Hypertension in his daughter and sister; Other - See Comments in his daughter and sister.  SOCIAL HISTORY:   reports that he quit smoking about 38 years ago. His smoking use included cigarettes. He started smoking about 53 years ago. He has a 30 pack-year smoking history. He has been exposed to tobacco smoke. He has never used smokeless  tobacco. He reports that he does not drink alcohol and does not use drugs.    Post Discharge Medication Reconciliation Status: discharge medications reconciled and changed, per note/orders  Current Outpatient Medications   Medication Sig Dispense Refill    acetaminophen (TYLENOL) 325 MG tablet Take 2 tablets (650 mg) by mouth every 6 hours as needed for mild pain 60 tablet 0    acyclovir (ZOVIRAX) 400 MG tablet TAKE 1 TABLET (400 MG) BY MOUTH 2 TIMES DAILY 60 tablet 3    albuterol (PROAIR HFA/PROVENTIL HFA/VENTOLIN HFA) 108 (90 Base) MCG/ACT inhaler Inhale 1-2 puffs into the lungs every 6 hours as needed for shortness of breath or wheezing 8.5 g 3    aspirin (ASA) 81 MG EC tablet Take 1 tablet (81 mg) by mouth daily 90 tablet 3    azithromycin (ZITHROMAX) 250 MG tablet Take 250 mg by mouth Every Mon, Wed, Fri Morning      blood glucose (NO BRAND SPECIFIED) test strip USE TO TEST BLOOD SUGAR 3 TIMES DAILY. DIAG CODE: E11.9 300 strip 3    Calcium Carbonate-Vitamin D 600-10 MG-MCG TABS Take 1 tablet by mouth 2 times daily (with meals) 60 tablet 11    carvedilol (COREG) 6.25 MG tablet TAKE 1 TABLET (6.25 MG) BY MOUTH 2 TIMES DAILY (WITH MEALS) 120 tablet 3    clopidogrel (PLAVIX) 75 MG tablet Take 1 tablet (75 mg) by mouth daily 90 tablet 3    COPPER PO Take 1 tablet by mouth daily.      dapsone (ACZONE) 25 MG tablet Take 2 tablets (50 mg) by mouth daily 180 tablet 3    diclofenac (VOLTAREN) 1 % topical gel Apply 2 g topically 2 times daily as needed for moderate pain      econazole nitrate 1 % external cream APPLY TOPICALLY DAILY TO FEET AND HEELS. 85 g 3    Ferrous Sulfate Dried (HIGH POTENCY IRON) 65 MG TABS Take 1 tablet by mouth every morning.      fludrocortisone (FLORINEF) 0.1 MG tablet Take 1 tablet (0.1 mg) by mouth daily 90 tablet 3    fluticasone-salmeterol (ADVAIR-HFA) 230-21 MCG/ACT inhaler Inhale 2 puffs into the lungs 2 times daily 8 g 11    furosemide (LASIX) 20 MG tablet Take 1 tablet (20 mg) by mouth  daily 90 tablet 4    HYDROmorphone (DILAUDID) 2 MG tablet Take 0.5 tablets (1 mg) by mouth every 6 hours as needed for severe pain. 20 tablet 0    insulin aspart (NOVOLOG PEN) 100 UNIT/ML pen Take 5 U am, 3 unit(s) non, 5 unit(s) pm of insulin within 30 minutes of start of breakfast, lunch, and dinner. Do not give if blood sugar is less than 70 mg/dl.      insulin glargine (LANTUS PEN) 100 UNIT/ML pen Inject 18 Units Subcutaneous every morning (before breakfast)      insulin pen needle (32G X 4 MM) 32G X 4 MM miscellaneous Use 4 pen needles daily or as directed. Dispense as insurance allows. Dx. Code: E09.9 400 each 11    loperamide (IMODIUM) 2 MG capsule Take 1 capsule (2 mg) by mouth 4 times daily as needed for diarrhea 120 capsule 12    magnesium gluconate (MAGONATE) 500 MG tablet Take 1 tablet (500 mg) by mouth at bedtime.      metoclopramide (REGLAN) 5 MG tablet Take 1 tablet (5 mg) by mouth every 6 hours as needed (nausea) 30 tablet 0    Microlet Lancets MISC CHECK BLOOD SUGAR FOUR TIMES DAILY E11.9 400 each 1    montelukast (SINGULAIR) 10 MG tablet Take 1 tablet (10 mg) by mouth every evening 90 tablet 3    multivitamin, therapeutic (THERA-VIT) TABS Take 1 tablet by mouth daily 30 tablet 12    omeprazole 20 MG tablet Take 20 mg by mouth 2 times daily.      order for DME Equipment being ordered: diabetic shoes 1 each 0    pantoprazole (PROTONIX) 40 MG EC tablet Take 1 tablet (40 mg) by mouth daily 90 tablet 1    predniSONE (DELTASONE) 5 MG tablet Take 5 mg by mouth every morning. In addition, take one half tablet (2.5 mg) in the evening      predniSONE (DELTASONE) 5 MG tablet Take 2.5 mg by mouth every evening. In addition, take 1 tablet (5 mg) in the morning.      psyllium (METAMUCIL/KONSYL) capsule Take 2 capsules by mouth 2 times daily.      rosuvastatin (CRESTOR) 40 MG tablet Take 20 mg by mouth Every Mon, Wed, Fri Morning      tacrolimus (GENERIC EQUIVALENT) 0.5 MG capsule Take 5 capsules (2.5 mg) by  "mouth every morning AND 5 capsules (2.5 mg) every evening.      wound support modular (EXPEDITE) LIQD bottle Take 60 mLs by mouth daily.       No current facility-administered medications for this visit.       ROS:  4 point ROS including Respiratory, CV, GI and , other than that noted in the HPI,  is negative    Vitals:  BP (!) 141/79   Pulse 105   Temp 97.7  F (36.5  C)   Resp 16   Ht 1.727 m (5' 8\")   Wt 62 kg (136 lb 9.6 oz)   SpO2 96%   BMI 20.77 kg/m    Exam:  GENERAL APPEARANCE:  Alert, in no distress  RESP:  respiratory effort normal   M/S:  Gait and station sitting in recliner.  Has hard protection spint in place.   SKIN:  Inspection and palpation of skin and subcutaneous tissue at baseline incision not observed  PSYCH:  insight and judgement, memory intact, affect and mood normal    Lab/Diagnostic data: Pertinent hospital labs: WBC 7 hemoglobin 9 platelets 221 sodium 141 potassium 3.8  35.9 creatinine 1.02    ASSESSMENT/PLAN:  Status post below-knee amputation of right lower extremity (H)  Admitted to the TCU for therapy and nursing care following hospital stay.  -Continue PT/Ocupational Therapy  Nursing for monitoring-  - following for discharge planning  -Continue with  and brace per surgeon  -orthotics per juan.  -Continue as needed Dilaudid for now    Type 2 diabetes mellitus with diabetic polyneuropathy, with long-term current use of insulin (H)  Continues on glargine 18 units once daily and aspart TID 5/3/5 units per meal  - monitor blood sugars and follow up.     PAD (peripheral artery disease) (H24)  Continues on Plavix and statin.    Chronic kidney disease, stage 3b (H)  With SHELLEY during hospital stay.  Follow labs.    S/P lung transplant (H)  On tacrolimus and prednisone.  On prophylactic antibiotic and Valtrex.    Major depressive disorder, single episode, moderate (H)  Nurse reports family is requesting something for anxiety.  When I approached patient about this " he became tearful and relays difficulty coping for some time.  Discussed options and will start venlafaxine once daily and hydroxyzine as needed    Orders:  -CBC, BMP, admitted on limited  -Discontinue copper  -Venlafaxine ER 37.5 mg p.o. every day  -Hydroxyzine 10 mg p.o. every 8 hours as needed for anxiety    45 MINUTES SPENT BY ME on the date of service doing chart review, history, exam, documentation & further activities per the note.       Electronically signed by: Eva Clinton NP

## 2024-09-05 NOTE — LETTER
9/5/2024      Aubrey Duncan  [address]  [place] [address]        [facility]  PRIMARY CARE PROVIDER AND CLINIC:  Hoa Olivarez, APRN CNP, [address] / [place] [address]  Chief Complaint   Patient presents with     Hospital F/U     [facility] Medical Record Number:  [id number]  Place of Service where encounter took place:  [facility] (TCU/SNF) [[id number]]    Aubrey Duncan  is a 69 year old  (1955), admitted to the above facility from  [facility]. Hospital stay 8/25/24 through 9/4/24..  Admitted to this facility for  rehab, medical management, and nursing care.  Patient's living condition: lives with spouse    HPI:    HPI information obtained from: facility chart records, facility staff, patient report, [facility] chart review, and Care Everywhere [facility] chart review.     HPI:    Brief Summary of Hospital Course: Admitted for a right lower extremity wound infection.  Required right BKA on 8/28 due to peripheral arterial disease.  Hospital course complicated by acute respiratory failure and aspiration pneumonitis, acute blood loss anemia requiring 2 units of packed red blood cells.  Past medical history of bilateral lung transplant in 2013 with immunosuppression.  CAD, HTN, chronic diarrhea, DM2 on insulin.  MEDICATION CHANGES: Start magnesium gluconate, Metamucil, expedite.  Changed Dilaudid and tacrolimus.  Stop amlodipine, lisinopril, Mag-Ox, sildenafil.  RECOMMENDED FOLLOW UP: Vascular surgery as directed  Updates on Status Since Skilled nursing Admission: None    Today: Nurse request clarification of medications and diagnoses for medications.  Patient reports no concerns.  Pain is minimal and not using the narcotics.  On review of discharge summary it seemed that the insulin was stopped but then resumed unclear.  For now his blood sugars are stable on his current regimen will to follow closely.  He denies problems with constipation, trouble  "sleeping.     External notes reviewed: Facility EHR including progress notes, vital signs. Hospital discharge summary reviewed. Hospital discharge orders reviewed.  POLST reviewed and signed    CODE STATUS/ADVANCE DIRECTIVES DISCUSSION: Full code    ALLERGIES:   Allergies   Allergen Reactions     Heparin Other (See Comments)     HIT positive and AUGUST positive    No Heparin Antibody Identified on 8/15 blood test     Oxycodone Other (See Comments)     Significant lethargy, confusion. Tolerates dilaudid well.      Fluocinolone Other (See Comments)     Tendon problems       Gabapentin Nausea and Vomiting     Levaquin Muscle Pain (Myalgia)     Pneumococcal Vaccine Other (See Comments)     Other reaction(s): Fever  \"My arm swelled up like a balloon.\"     Varicella Zoster Immune Globulin Swelling      PAST MEDICAL HISTORY:   Past Medical History:   Diagnosis Date     Acute postoperative pain 09/11/2013     Alpha-1-antitrypsin deficiency (H)      Arthritis      Basal cell carcinoma      CMV (cytomegalovirus infection) (H)     Reacttivation Sept 2013 when valcyte held     Coronary artery disease      DVT of upper extremity (deep vein thrombosis) (H) 09/2013    Nonocclusive thrombosis extending from the right subclavian vein to the right axillary vein,  Segmental occlusion of right basilic vein in the upper arm. 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    With DVT and thrombocytopenia     Hypertension      Lung transplant status, bilateral (H) 09/08/2013    Complicated by HIT and esophageal dysfunction     Pneumonia of right lower lobe due to Pseudomonas species (H) 02/28/2019     Sepsis associated hypotension (H) 02/24/2019     Squamous cell carcinoma      Stented coronary artery      Steroid-induced diabetes mellitus (H24)      " Thrombocytopaenia     due to HIT     Ureteral stone 10/17/2017      PAST SURGICAL HISTORY:   has a past surgical history that includes tonsillectomy; Repair iris (1970); cataract iol, rt/lt; ESOPH/GAS REFLUX TEST W NASAL IMPED >1 HR (08/02/2012); Transplant lung recipient single x2 (09/08/2013); Esophagoscopy, gastroscopy, duodenoscopy (EGD), combined (09/12/2013); Bronchoscopy flexible and rigid (09/17/2013); Esophagoscopy, gastroscopy, duodenoscopy (EGD), dilatation, combined (11/06/2013); Mohs micrographic procedure; picc insertion (Left, 09/22/2014); Esophagoscopy, gastroscopy, duodenoscopy (EGD), combined; Esophagoscopy, gastroscopy, duodenoscopy (EGD), combined (N/A, 12/07/2015); Cystoscopy, retrogrades, insert stent ureter(s), combined (Left, 10/18/2017); Laser holmium lithotripsy ureter(s), insert stent, combined (Left, 11/09/2017); Lung surgery; colonoscopy (08/17/2018); IR OR Angiogram (08/16/2022); Angiogram (Bilateral, 08/16/2022); Endoscopic ultrasound upper gastrointestinal tract (GI) (N/A, 07/10/2023); Colonoscopy (N/A, 09/28/2023); Coronary Angiogram (N/A, 1/11/2024); Percutaneous Coronary Intervention (N/A, 1/11/2024); Percutaneous Coronary Intervention (N/A, 2/16/2024); Coronary Angiogram (N/A, 2/16/2024); Endoscopic ultrasound upper gastrointestinal tract (GI) (N/A, 6/5/2024); Femoral Artery - Tibial Artery Bypass Graft (Right, 8/1/2024); IR Lower Extremity Angiogram Bilateral (7/9/2024); PICC/Midline Placement (8/29/2024); and Amputate leg below knee (Right, 8/28/2024).  FAMILY HISTORY: family history includes Asthma in his mother and sister; C.A.D. in his mother; Cerebrovascular Disease in his father; Diabetes in his sister; Heart Failure in his mother; Hypertension in his daughter and sister; Other - See Comments in his daughter and sister.  SOCIAL HISTORY:   reports that he quit smoking about 38 years ago. His smoking use included cigarettes. He started smoking about 53 years ago. He has a 30  pack-year smoking history. He has been exposed to tobacco smoke. He has never used smokeless tobacco. He reports that he does not drink alcohol and does not use drugs.    Post Discharge Medication Reconciliation Status: discharge medications reconciled and changed, per note/orders  Current Outpatient Medications   Medication Sig Dispense Refill     acetaminophen (TYLENOL) 325 MG tablet Take 2 tablets (650 mg) by mouth every 6 hours as needed for mild pain 60 tablet 0     acyclovir (ZOVIRAX) 400 MG tablet TAKE 1 TABLET (400 MG) BY MOUTH 2 TIMES DAILY 60 tablet 3     albuterol (PROAIR HFA/PROVENTIL HFA/VENTOLIN HFA) 108 (90 Base) MCG/ACT inhaler Inhale 1-2 puffs into the lungs every 6 hours as needed for shortness of breath or wheezing 8.5 g 3     aspirin (ASA) 81 MG EC tablet Take 1 tablet (81 mg) by mouth daily 90 tablet 3     azithromycin (ZITHROMAX) 250 MG tablet Take 250 mg by mouth Every Mon, Wed, Fri Morning       blood glucose (NO BRAND SPECIFIED) test strip USE TO TEST BLOOD SUGAR 3 TIMES DAILY. DIAG CODE: E11.9 300 strip 3     Calcium Carbonate-Vitamin D 600-10 MG-MCG TABS Take 1 tablet by mouth 2 times daily (with meals) 60 tablet 11     carvedilol (COREG) 6.25 MG tablet TAKE 1 TABLET (6.25 MG) BY MOUTH 2 TIMES DAILY (WITH MEALS) 120 tablet 3     clopidogrel (PLAVIX) 75 MG tablet Take 1 tablet (75 mg) by mouth daily 90 tablet 3     COPPER PO Take 1 tablet by mouth daily.       dapsone (ACZONE) 25 MG tablet Take 2 tablets (50 mg) by mouth daily 180 tablet 3     diclofenac (VOLTAREN) 1 % topical gel Apply 2 g topically 2 times daily as needed for moderate pain       econazole nitrate 1 % external cream APPLY TOPICALLY DAILY TO FEET AND HEELS. 85 g 3     Ferrous Sulfate Dried (HIGH POTENCY IRON) 65 MG TABS Take 1 tablet by mouth every morning.       fludrocortisone (FLORINEF) 0.1 MG tablet Take 1 tablet (0.1 mg) by mouth daily 90 tablet 3     fluticasone-salmeterol (ADVAIR-HFA) 230-21 MCG/ACT inhaler Inhale 2  puffs into the lungs 2 times daily 8 g 11     furosemide (LASIX) 20 MG tablet Take 1 tablet (20 mg) by mouth daily 90 tablet 4     HYDROmorphone (DILAUDID) 2 MG tablet Take 0.5 tablets (1 mg) by mouth every 6 hours as needed for severe pain. 20 tablet 0     insulin aspart (NOVOLOG PEN) 100 UNIT/ML pen Take 5 U am, 3 unit(s) non, 5 unit(s) pm of insulin within 30 minutes of start of breakfast, lunch, and dinner. Do not give if blood sugar is less than 70 mg/dl.       insulin glargine (LANTUS PEN) 100 UNIT/ML pen Inject 18 Units Subcutaneous every morning (before breakfast)       insulin pen needle (32G X 4 MM) 32G X 4 MM miscellaneous Use 4 pen needles daily or as directed. Dispense as insurance allows. Dx. Code: E09.9 400 each 11     loperamide (IMODIUM) 2 MG capsule Take 1 capsule (2 mg) by mouth 4 times daily as needed for diarrhea 120 capsule 12     magnesium gluconate (MAGONATE) 500 MG tablet Take 1 tablet (500 mg) by mouth at bedtime.       metoclopramide (REGLAN) 5 MG tablet Take 1 tablet (5 mg) by mouth every 6 hours as needed (nausea) 30 tablet 0     Microlet Lancets MISC CHECK BLOOD SUGAR FOUR TIMES DAILY E11.9 400 each 1     montelukast (SINGULAIR) 10 MG tablet Take 1 tablet (10 mg) by mouth every evening 90 tablet 3     multivitamin, therapeutic (THERA-VIT) TABS Take 1 tablet by mouth daily 30 tablet 12     omeprazole 20 MG tablet Take 20 mg by mouth 2 times daily.       order for DME Equipment being ordered: diabetic shoes 1 each 0     pantoprazole (PROTONIX) 40 MG EC tablet Take 1 tablet (40 mg) by mouth daily 90 tablet 1     predniSONE (DELTASONE) 5 MG tablet Take 5 mg by mouth every morning. In addition, take one half tablet (2.5 mg) in the evening       predniSONE (DELTASONE) 5 MG tablet Take 2.5 mg by mouth every evening. In addition, take 1 tablet (5 mg) in the morning.       psyllium (METAMUCIL/KONSYL) capsule Take 2 capsules by mouth 2 times daily.       rosuvastatin (CRESTOR) 40 MG tablet Take  "20 mg by mouth Every Mon, Wed, Fri Morning       tacrolimus (GENERIC EQUIVALENT) 0.5 MG capsule Take 5 capsules (2.5 mg) by mouth every morning AND 5 capsules (2.5 mg) every evening.       wound support modular (EXPEDITE) LIQD bottle Take 60 mLs by mouth daily.       No current facility-administered medications for this visit.       ROS:  4 point ROS including Respiratory, CV, GI and , other than that noted in the HPI,  is negative    Vitals:  BP (!) 141/79   Pulse 105   Temp 97.7  F (36.5  C)   Resp 16   Ht 1.727 m (5' 8\")   Wt 62 kg (136 lb 9.6 oz)   SpO2 96%   BMI 20.77 kg/m    Exam:  GENERAL APPEARANCE:  Alert, in no distress  RESP:  respiratory effort normal   M/S:  Gait and station sitting in recliner.  Has hard protection spint in place.   SKIN:  Inspection and palpation of skin and subcutaneous tissue at baseline incision not observed  PSYCH:  insight and judgement, memory intact, affect and mood normal    Lab/Diagnostic data: Pertinent hospital labs: WBC 7 hemoglobin 9 platelets 221 sodium 141 potassium 3.8  35.9 creatinine 1.02    ASSESSMENT/PLAN:  Status post below-knee amputation of right lower extremity (H)  Admitted to the TCU for therapy and nursing care following hospital stay.  -Continue PT/Ocupational Therapy  Nursing for monitoring-  - following for discharge planning  -Continue with  and brace per surgeon  -orthotics per suregeon.  -Continue as needed Dilaudid for now    Type 2 diabetes mellitus with diabetic polyneuropathy, with long-term current use of insulin (H)  Continues on glargine 18 units once daily and aspart TID 5/3/5 units per meal  - monitor blood sugars and follow up.     PAD (peripheral artery disease) (H24)  Continues on Plavix and statin.    Chronic kidney disease, stage 3b (H)  With SHELLEY during hospital stay.  Follow labs.    S/P lung transplant (H)  On tacrolimus and prednisone.  On prophylactic antibiotic and Valtrex.    Major depressive disorder, " single episode, moderate (H)  Nurse reports family is requesting something for anxiety.  When I approached patient about this he became tearful and relays difficulty coping for some time.  Discussed options and will start venlafaxine once daily and hydroxyzine as needed    Orders:  -CBC, BMP, admitted on limited  -Discontinue copper  -Venlafaxine ER 37.5 mg p.o. every day  -Hydroxyzine 10 mg p.o. every 8 hours as needed for anxiety    45 MINUTES SPENT BY ME on the date of service doing chart review, history, exam, documentation & further activities per the note.       Electronically signed by: Eva Clinton NP      Sincerely,        Eva Clinton NP

## 2024-09-06 ENCOUNTER — DOCUMENTATION ONLY (OUTPATIENT)
Dept: ORTHOPEDICS | Facility: CLINIC | Age: 69
End: 2024-09-06
Payer: MEDICARE

## 2024-09-06 ENCOUNTER — TELEPHONE (OUTPATIENT)
Dept: TRANSPLANT | Facility: CLINIC | Age: 69
End: 2024-09-06
Payer: MEDICARE

## 2024-09-06 PROBLEM — F32.1 MAJOR DEPRESSIVE DISORDER, SINGLE EPISODE, MODERATE (H): Status: ACTIVE | Noted: 2024-09-06

## 2024-09-06 LAB
TACROLIMUS BLD-MCNC: 15.7 UG/L (ref 5–15)
TME LAST DOSE: ABNORMAL H
TME LAST DOSE: ABNORMAL H

## 2024-09-06 PROCEDURE — P9604 ONE-WAY ALLOW PRORATED TRIP: HCPCS | Performed by: FAMILY MEDICINE

## 2024-09-06 PROCEDURE — 80197 ASSAY OF TACROLIMUS: CPT | Performed by: FAMILY MEDICINE

## 2024-09-06 PROCEDURE — 36415 COLL VENOUS BLD VENIPUNCTURE: CPT | Performed by: FAMILY MEDICINE

## 2024-09-06 NOTE — PROGRESS NOTES
I called Aubrey to follow up on his BKA and the protector I furnished him at the hospital. He [place] and [place] once he is discharged from the TCU in [place]. He is not sure about getting a prosthesis at this point and will check into it once he is moved to [place]. He did not think I should call him any time soon due to his move to [place]. I estimate I should call him in 1 month.   Burak Cameron CPO, LPO.

## 2024-09-06 NOTE — TELEPHONE ENCOUNTER
Date of Admission:  8/25/2024  Date of Discharge:  9/4/2024    admitted for RLE wound infection now s/p R BKA.  Postoperative course complicated by hypotension requiring vasopressors, respiratory failure and anemia requiring PRBC.    Discharge Disposition: [facility]

## 2024-09-09 ENCOUNTER — TELEPHONE (OUTPATIENT)
Dept: TRANSPLANT | Facility: CLINIC | Age: 69
End: 2024-09-09

## 2024-09-09 ENCOUNTER — TRANSITIONAL CARE UNIT VISIT (OUTPATIENT)
Dept: GERIATRICS | Facility: CLINIC | Age: 69
End: 2024-09-09
Payer: MEDICARE

## 2024-09-09 ENCOUNTER — DOCUMENTATION ONLY (OUTPATIENT)
Dept: GERIATRICS | Facility: CLINIC | Age: 69
End: 2024-09-09
Payer: MEDICARE

## 2024-09-09 VITALS
DIASTOLIC BLOOD PRESSURE: 58 MMHG | HEIGHT: 68 IN | OXYGEN SATURATION: 95 % | TEMPERATURE: 97.5 F | RESPIRATION RATE: 16 BRPM | SYSTOLIC BLOOD PRESSURE: 111 MMHG | WEIGHT: 242.2 LBS | BODY MASS INDEX: 36.71 KG/M2 | HEART RATE: 109 BPM

## 2024-09-09 DIAGNOSIS — I73.9 PAD (PERIPHERAL ARTERY DISEASE) (H): ICD-10-CM

## 2024-09-09 DIAGNOSIS — Z79.4 TYPE 2 DIABETES MELLITUS WITH DIABETIC POLYNEUROPATHY, WITH LONG-TERM CURRENT USE OF INSULIN (H): ICD-10-CM

## 2024-09-09 DIAGNOSIS — F32.1 MAJOR DEPRESSIVE DISORDER, SINGLE EPISODE, MODERATE (H): ICD-10-CM

## 2024-09-09 DIAGNOSIS — E11.42 TYPE 2 DIABETES MELLITUS WITH DIABETIC POLYNEUROPATHY, WITH LONG-TERM CURRENT USE OF INSULIN (H): ICD-10-CM

## 2024-09-09 DIAGNOSIS — N18.32 CHRONIC KIDNEY DISEASE, STAGE 3B (H): ICD-10-CM

## 2024-09-09 DIAGNOSIS — Z94.2 S/P LUNG TRANSPLANT (H): ICD-10-CM

## 2024-09-09 DIAGNOSIS — Z89.511 STATUS POST BELOW-KNEE AMPUTATION OF RIGHT LOWER EXTREMITY (H): Primary | ICD-10-CM

## 2024-09-09 PROBLEM — D84.9 IMMUNOCOMPROMISED STATE (H): Status: ACTIVE | Noted: 2024-03-21

## 2024-09-09 PROBLEM — M10.9 GOUT: Status: RESOLVED | Noted: 2018-01-31 | Resolved: 2024-09-09

## 2024-09-09 PROBLEM — D62 ACUTE BLOOD LOSS ANEMIA: Status: ACTIVE | Noted: 2024-03-21

## 2024-09-09 LAB
ANION GAP SERPL CALCULATED.3IONS-SCNC: 12 MMOL/L (ref 7–15)
BUN SERPL-MCNC: 24.8 MG/DL (ref 8–23)
CALCIUM SERPL-MCNC: 8.6 MG/DL (ref 8.8–10.4)
CHLORIDE SERPL-SCNC: 105 MMOL/L (ref 98–107)
CREAT SERPL-MCNC: 1.07 MG/DL (ref 0.67–1.17)
EGFRCR SERPLBLD CKD-EPI 2021: 74 ML/MIN/1.73M2
ERYTHROCYTE [DISTWIDTH] IN BLOOD BY AUTOMATED COUNT: 16.6 % (ref 10–15)
GLUCOSE SERPL-MCNC: 124 MG/DL (ref 70–99)
HCO3 SERPL-SCNC: 23 MMOL/L (ref 22–29)
HCT VFR BLD AUTO: 31.5 % (ref 40–53)
HGB BLD-MCNC: 9.6 G/DL (ref 13.3–17.7)
MAGNESIUM SERPL-MCNC: 1.6 MG/DL (ref 1.7–2.3)
MCH RBC QN AUTO: 31.8 PG (ref 26.5–33)
MCHC RBC AUTO-ENTMCNC: 30.5 G/DL (ref 31.5–36.5)
MCV RBC AUTO: 104 FL (ref 78–100)
PLATELET # BLD AUTO: 387 10E3/UL (ref 150–450)
POTASSIUM SERPL-SCNC: 4.1 MMOL/L (ref 3.4–5.3)
RBC # BLD AUTO: 3.02 10E6/UL (ref 4.4–5.9)
SODIUM SERPL-SCNC: 140 MMOL/L (ref 135–145)
WBC # BLD AUTO: 9 10E3/UL (ref 4–11)

## 2024-09-09 PROCEDURE — 83735 ASSAY OF MAGNESIUM: CPT | Performed by: FAMILY MEDICINE

## 2024-09-09 PROCEDURE — 85027 COMPLETE CBC AUTOMATED: CPT | Performed by: FAMILY MEDICINE

## 2024-09-09 PROCEDURE — 80048 BASIC METABOLIC PNL TOTAL CA: CPT | Performed by: FAMILY MEDICINE

## 2024-09-09 PROCEDURE — P9604 ONE-WAY ALLOW PRORATED TRIP: HCPCS | Performed by: FAMILY MEDICINE

## 2024-09-09 PROCEDURE — 99309 SBSQ NF CARE MODERATE MDM 30: CPT | Performed by: NURSE PRACTITIONER

## 2024-09-09 PROCEDURE — 36415 COLL VENOUS BLD VENIPUNCTURE: CPT | Performed by: FAMILY MEDICINE

## 2024-09-09 RX ORDER — VENLAFAXINE HYDROCHLORIDE 37.5 MG/1
37.5 CAPSULE, EXTENDED RELEASE ORAL DAILY
COMMUNITY

## 2024-09-09 NOTE — LETTER
PHYSICIAN ORDERS      DATE & TIME ISSUED: 2024 5:49 AM  PATIENT NAME: Aubrey Duncan   : 1955     [facility] MR# [if applicable]: [id number]     DIAGNOSIS:  Lung Transplant  Z94.2    Please draw BMP and tacrolimus level (note time of last dose) on 24.    Any questions please call: Luz [phone]    Please fax these results to [phone].         Alma Murphy MD  Pulmonary Disease

## 2024-09-09 NOTE — LETTER
9/9/2024      Aubrey Duncan  [address]  [place] [address]        [facility]  TCU FOLLOW UP VISIT    Chief Complaint   Patient presents with     RECHECK      Place of Service where encounter took place:  [facility] (TCU/SNF) [4000]    Aubrey Duncan  is a 69 year old  (1955), who is being seen today at the above facility to discuss progress in therapy, review nursing home EHR, recheck chronic medical problems as well as address any new concerns.       HPI:    Copied forward from previous note: admitted to the above facility from  [facility]. Hospital stay 8/25/24 through 9/4/24..  Admitted to this facility for  rehab, medical management, and nursing care.  Patient's living condition: lives with spouse  Brief Summary of Hospital Course: Admitted for a right lower extremity wound infection.  Required right BKA on 8/28 due to peripheral arterial disease.  Hospital course complicated by acute respiratory failure and aspiration pneumonitis, acute blood loss anemia requiring 2 units of packed red blood cells.  Past medical history of bilateral lung transplant in 2013 with immunosuppression.  CAD, HTN, chronic diarrhea, DM2 on insulin.  MEDICATION CHANGES: Start magnesium gluconate, Metamucil, expedite.  Changed Dilaudid and tacrolimus.  Stop amlodipine, lisinopril, Mag-Ox, sildenafil.  RECOMMENDED FOLLOW UP: Vascular surgery as directed  Updates on Status Since Skilled nursing Admission: 9/5 -CBC, BMP, admitted on limited  -Discontinue copper  -Venlafaxine ER 37.5 mg p.o. every day  -Hydroxyzine 10 mg p.o. every 8 hours as needed for anxiety    Today: Nurse reports no concerns.  Therapy reports able to hop on 1 leg for 35 feet.  Therapy with set up.  Slums 28/30.  Anticipate will be able to discharge to home in the next 1 -2 weeks.  Patient reports pain is controlled.  Denies problems with sleeping, eating, constipation.  Denies shortness of breath.   "Facility EHR shows diabetic ulcer on left fifth digit.  Blood pressures 110/66-1 74/118.  Pulse 104-109.  Blood sugars a.m. 101-125 noon 94-1 88.  P.m. 111-201.  At bedtime .  Currently on NovoLog 5 units with breakfast 3 units with lunch and 5 units with supper.  Will adjust to 5/3/3.  Patient reports new ulcer on left second toe.  He reports will have angiogram on 9/20.     Facility EHR reviewed including bm record, progress notes, vital signs and MAR.     ALLERGIES: Heparin, Oxycodone, Fluocinolone, Gabapentin, Levaquin, Pneumococcal vaccine, and Varicella zoster immune globulin    ROS:  4 point ROS including Respiratory, CV, GI and , other than that noted in the HPI,  is negative    Vitals:  /58   Pulse 109   Temp 97.5  F (36.4  C)   Resp 16   Ht 1.727 m (5' 8\")   Wt 109.9 kg (242 lb 3.2 oz)   SpO2 95%   BMI 36.83 kg/m    Exam:  GENERAL APPEARANCE:  Alert, in no distress  RESP:  respiratory effort normal  CV:  edema none  M/S:  Gait and station lying in bed.  no tenderness or swelling of the joints   SKIN:  Inspection and palpation of skin and subcutaneous tissue at baseline. Incision looks healthy. Left 5th digit ulcer and new 2nd digit ulcer.   PSYCH:  insight and judgement, memory intact, affect and mood normal    ASSESSMENT/PLAN:  Status post below-knee amputation of right lower extremity (H)  Admitted to the TCU for therapy and nursing care following hospital stay.  -Continue PT/Ocupational Therapy  Nursing for monitoring-  - following for discharge planning  -Continue with  and brace per surgeon  -orthotics per juan.  -Continue as needed Dilaudid for now     Type 2 diabetes mellitus with diabetic polyneuropathy, with long-term current use of insulin (H)  Continues on glargine 18 units once daily and aspart TID 5/3/5 units per meal.  Blood sugars before bed a little lower than needed.  Will decrease suppertime insulin and follow-up.  - monitor blood sugars and " follow up.      PAD (peripheral artery disease) (H24)  Continues on Plavix and statin.  Has chronic ulcer of the fifth digit and new ulcer of the second digit.  Has an angiogram scheduled for 9/20.  Has cream prescribed by surgeon - continue econazole.     Chronic kidney disease, stage 3b (H)  With SHELLEY during hospital stay.  Follow labs.     S/P lung transplant (H)  On tacrolimus and prednisone.  On prophylactic antibiotic and Valtrex.     Major depressive disorder, single episode, moderate (H)  Recently started venlafaxine.  Also has as needed hydroxyzine for anxiety and difficulty sleeping.  -Continue medications  -Continue supportive care    Orders:  Change NovoLog to 5 units with breakfast and 3 units with lunch and supper    Electronically signed by: Eva Clinton NP      Sincerely,        Eva Clinton NP

## 2024-09-09 NOTE — PROGRESS NOTES
[facility] GERIATRICS  TCU FOLLOW UP VISIT    Chief Complaint   Patient presents with    RECHECK      Place of Service where encounter took place:  [facility] (TCU/SNF) [4000]    Aubrey Duncan  is a 69 year old  (1955), who is being seen today at the above facility to discuss progress in therapy, review nursing home EHR, recheck chronic medical problems as well as address any new concerns.       HPI:    Copied forward from previous note: admitted to the above facility from  [facility]. Hospital stay 8/25/24 through 9/4/24..  Admitted to this facility for  rehab, medical management, and nursing care.  Patient's living condition: lives with spouse  Brief Summary of Hospital Course: Admitted for a right lower extremity wound infection.  Required right BKA on 8/28 due to peripheral arterial disease.  Hospital course complicated by acute respiratory failure and aspiration pneumonitis, acute blood loss anemia requiring 2 units of packed red blood cells.  Past medical history of bilateral lung transplant in 2013 with immunosuppression.  CAD, HTN, chronic diarrhea, DM2 on insulin.  MEDICATION CHANGES: Start magnesium gluconate, Metamucil, expedite.  Changed Dilaudid and tacrolimus.  Stop amlodipine, lisinopril, Mag-Ox, sildenafil.  RECOMMENDED FOLLOW UP: Vascular surgery as directed  Updates on Status Since Skilled nursing Admission: 9/5 -CBC, BMP, admitted on limited  -Discontinue copper  -Venlafaxine ER 37.5 mg p.o. every day  -Hydroxyzine 10 mg p.o. every 8 hours as needed for anxiety    Today: Nurse reports no concerns.  Therapy reports able to hop on 1 leg for 35 feet.  Therapy with set up.  Slums 28/30.  Anticipate will be able to discharge to home in the next 1 -2 weeks.  Patient reports pain is controlled.  Denies problems with sleeping, eating, constipation.  Denies shortness of breath.  Facility EHR shows diabetic ulcer on left fifth digit.  Blood pressures  "110/66-1 74/118.  Pulse 104-109.  Blood sugars a.m. 101-125 noon 94-1 88.  P.m. 111-201.  At bedtime .  Currently on NovoLog 5 units with breakfast 3 units with lunch and 5 units with supper.  Will adjust to 5/3/3.  Patient reports new ulcer on left second toe.  He reports will have angiogram on 9/20.     Facility EHR reviewed including bm record, progress notes, vital signs and MAR.     ALLERGIES: Heparin, Oxycodone, Fluocinolone, Gabapentin, Levaquin, Pneumococcal vaccine, and Varicella zoster immune globulin    ROS:  4 point ROS including Respiratory, CV, GI and , other than that noted in the HPI,  is negative    Vitals:  /58   Pulse 109   Temp 97.5  F (36.4  C)   Resp 16   Ht 1.727 m (5' 8\")   Wt 109.9 kg (242 lb 3.2 oz)   SpO2 95%   BMI 36.83 kg/m    Exam:  GENERAL APPEARANCE:  Alert, in no distress  RESP:  respiratory effort normal  CV:  edema none  M/S:  Gait and station lying in bed.  no tenderness or swelling of the joints   SKIN:  Inspection and palpation of skin and subcutaneous tissue at baseline. Incision looks healthy. Left 5th digit ulcer and new 2nd digit ulcer.   PSYCH:  insight and judgement, memory intact, affect and mood normal    ASSESSMENT/PLAN:  Status post below-knee amputation of right lower extremity (H)  Admitted to the TCU for therapy and nursing care following hospital stay.  -Continue PT/Ocupational Therapy  Nursing for monitoring-  - following for discharge planning  -Continue with  and brace per surgeon  -orthotics per juan.  -Continue as needed Dilaudid for now     Type 2 diabetes mellitus with diabetic polyneuropathy, with long-term current use of insulin (H)  Continues on glargine 18 units once daily and aspart TID 5/3/5 units per meal.  Blood sugars before bed a little lower than needed.  Will decrease suppertime insulin and follow-up.  - monitor blood sugars and follow up.      PAD (peripheral artery disease) (H24)  Continues on " Plavix and statin.  Has chronic ulcer of the fifth digit and new ulcer of the second digit.  Has an angiogram scheduled for 9/20.  Has cream prescribed by surgeon - continue econazole.     Chronic kidney disease, stage 3b (H)  With SHELLEY during hospital stay.  Follow labs.     S/P lung transplant (H)  On tacrolimus and prednisone.  On prophylactic antibiotic and Valtrex.     Major depressive disorder, single episode, moderate (H)  Recently started venlafaxine.  Also has as needed hydroxyzine for anxiety and difficulty sleeping.  -Continue medications  -Continue supportive care    Orders:  Change NovoLog to 5 units with breakfast and 3 units with lunch and supper    Electronically signed by: Eva Clinton NP

## 2024-09-10 ENCOUNTER — LAB REQUISITION (OUTPATIENT)
Dept: LAB | Facility: CLINIC | Age: 69
End: 2024-09-10
Payer: MEDICARE

## 2024-09-10 DIAGNOSIS — Z94.2 LUNG TRANSPLANT STATUS (H): ICD-10-CM

## 2024-09-10 NOTE — TELEPHONE ENCOUNTER
Last prescription:    metoprolol tartrate (LOPRESSOR) 25 MG tablet 180 tablet 4 6/21/2021  No   Sig - Route: Take 1 tablet (25 mg) by mouth 2 times daily - Oral     [id number] ([facility] [facility] - [place], [place] - [address]     Please send new prescription to [facility]. Unable to complete prescription refill per RN Medication Refill Policy. Brooke Nicholas RN .............. 8/23/2021  1:04 PM       Negative

## 2024-09-11 ENCOUNTER — LAB REQUISITION (OUTPATIENT)
Dept: LAB | Facility: CLINIC | Age: 69
End: 2024-09-11
Payer: MEDICARE

## 2024-09-11 LAB
ANION GAP SERPL CALCULATED.3IONS-SCNC: 11 MMOL/L (ref 7–15)
BUN SERPL-MCNC: 30.5 MG/DL (ref 8–23)
CALCIUM SERPL-MCNC: 8.4 MG/DL (ref 8.8–10.4)
CHLORIDE SERPL-SCNC: 107 MMOL/L (ref 98–107)
CREAT SERPL-MCNC: 0.96 MG/DL (ref 0.67–1.17)
EGFRCR SERPLBLD CKD-EPI 2021: 85 ML/MIN/1.73M2
GLUCOSE SERPL-MCNC: 103 MG/DL (ref 70–99)
HCO3 SERPL-SCNC: 24 MMOL/L (ref 22–29)
PATH REPORT.ADDENDUM SPEC: NORMAL
PATH REPORT.COMMENTS IMP SPEC: NORMAL
PATH REPORT.FINAL DX SPEC: NORMAL
PATH REPORT.GROSS SPEC: NORMAL
PATH REPORT.MICROSCOPIC SPEC OTHER STN: NORMAL
PATH REPORT.RELEVANT HX SPEC: NORMAL
PHOTO IMAGE: NORMAL
POTASSIUM SERPL-SCNC: 3.8 MMOL/L (ref 3.4–5.3)
SODIUM SERPL-SCNC: 142 MMOL/L (ref 135–145)
TACROLIMUS BLD-MCNC: 5.8 UG/L (ref 5–15)
TME LAST DOSE: NORMAL H
TME LAST DOSE: NORMAL H

## 2024-09-11 PROCEDURE — P9604 ONE-WAY ALLOW PRORATED TRIP: HCPCS | Performed by: FAMILY MEDICINE

## 2024-09-11 PROCEDURE — 80197 ASSAY OF TACROLIMUS: CPT | Performed by: FAMILY MEDICINE

## 2024-09-11 PROCEDURE — 36415 COLL VENOUS BLD VENIPUNCTURE: CPT | Performed by: INTERNAL MEDICINE

## 2024-09-11 PROCEDURE — 80048 BASIC METABOLIC PNL TOTAL CA: CPT | Mod: ORL | Performed by: INTERNAL MEDICINE

## 2024-09-11 PROCEDURE — 36415 COLL VENOUS BLD VENIPUNCTURE: CPT | Performed by: FAMILY MEDICINE

## 2024-09-11 NOTE — TELEPHONE ENCOUNTER
Tacrolimus level 15.7 on 9/6/24.  Spoke with Sanaz Fong RN at [facility].  Inaccurate 12 hour level.  Confirmed pt's current tacrolimus dose of 2.5 mg BID.  Plan for repeat tacrolimus level on 9/11.  Pt takes tacrolimus ~8 AM and 8 PM and instructed RN for pt to hold AM tacrolimus dose until after lab draw.

## 2024-09-12 ENCOUNTER — TRANSITIONAL CARE UNIT VISIT (OUTPATIENT)
Dept: GERIATRICS | Facility: CLINIC | Age: 69
End: 2024-09-12
Payer: MEDICARE

## 2024-09-12 ENCOUNTER — LAB REQUISITION (OUTPATIENT)
Dept: LAB | Facility: CLINIC | Age: 69
End: 2024-09-12
Payer: MEDICARE

## 2024-09-12 ENCOUNTER — TELEPHONE (OUTPATIENT)
Dept: TRANSPLANT | Facility: CLINIC | Age: 69
End: 2024-09-12
Payer: MEDICARE

## 2024-09-12 DIAGNOSIS — Z94.2 S/P LUNG TRANSPLANT (H): ICD-10-CM

## 2024-09-12 DIAGNOSIS — E11.42 TYPE 2 DIABETES MELLITUS WITH DIABETIC POLYNEUROPATHY, WITH LONG-TERM CURRENT USE OF INSULIN (H): ICD-10-CM

## 2024-09-12 DIAGNOSIS — Z89.511 STATUS POST BELOW-KNEE AMPUTATION OF RIGHT LOWER EXTREMITY (H): Primary | ICD-10-CM

## 2024-09-12 DIAGNOSIS — Z94.2 LUNG TRANSPLANT STATUS (H): ICD-10-CM

## 2024-09-12 DIAGNOSIS — Z79.4 TYPE 2 DIABETES MELLITUS WITH DIABETIC POLYNEUROPATHY, WITH LONG-TERM CURRENT USE OF INSULIN (H): ICD-10-CM

## 2024-09-12 DIAGNOSIS — F32.1 MAJOR DEPRESSIVE DISORDER, SINGLE EPISODE, MODERATE (H): ICD-10-CM

## 2024-09-12 DIAGNOSIS — N18.32 CHRONIC KIDNEY DISEASE, STAGE 3B (H): ICD-10-CM

## 2024-09-12 DIAGNOSIS — I73.9 PAD (PERIPHERAL ARTERY DISEASE) (H): ICD-10-CM

## 2024-09-12 PROCEDURE — 99309 SBSQ NF CARE MODERATE MDM 30: CPT | Performed by: NURSE PRACTITIONER

## 2024-09-12 NOTE — PROGRESS NOTES
"[facility] GERIATRICS    PRIMARY CARE PROVIDER AND CLINIC:  BRANDI Manning CNP, [address] / [place] [address]  Chief Complaint   Patient presents with    Hospital F/U      [facility] Medical Record Number:  [id number]  Place of Service where encounter took place:  [facility] (TCU/SNF) [[id number]]    Aubrey Duncan  is a 69 year old  (1955), admitted to the above facility from  [facility]. Hospital stay 8/15/24 through 9/4/24..   HPI:    According to GNP's note:\" Admitted for a right lower extremity wound infection.  Required right BKA on 8/28 due to peripheral arterial disease.  Hospital course complicated by acute respiratory failure and aspiration pneumonitis, acute blood loss anemia requiring 2 units of packed red blood cells.  Past medical history of bilateral lung transplant in 2013 with immunosuppression.  CAD, HTN, chronic diarrhea, DM2 on insulin.  MEDICATION CHANGES: Start magnesium gluconate, Metamucil, expedite.  Changed Dilaudid and tacrolimus.  Stop amlodipine, lisinopril, Mag-Ox, sildenafil.  RECOMMENDED FOLLOW UP: Vascular surgery as directed  Updates on Status Since Skilled nursing Admission: 9/5 -CBC, BMP, admitted on limited  - Discontinue copper  - Venlafaxine ER 37.5 mg p.o. every day  - Hydroxyzine 10 mg p.o. every 8 hours as needed for anxiety\"      TODAY:  -RBKA: Patient reports occasional very mild tingling feeling in the amputated leg.  Reports slight pressure from the otherwise no concerns.      -ABLA: Reports appetite is fine.  Not very fond of the food here.  He does cook at home.  He is working on getting some weight.    -Respiratory: Denies any concern.  Reports chronic chest pressure from taking deep breaths that since he had the lung transplant 2015.    - Anxiety and Depression: Reports now doing better.  =====================================================================    CODE STATUS/ADVANCE DIRECTIVES DISCUSSION: " " Full Code    ALLERGIES:   Allergies   Allergen Reactions    Heparin Other (See Comments)     HIT positive and AUGUST positive. No Heparin Antibody Identified on 8/15 blood test.    Oxycodone Confusion     Significant lethargy. Tolerates Dilaudid well.     Fluocinolone Other (See Comments)     Tendon problems      Gabapentin Nausea and Vomiting    Levaquin Muscle Pain (Myalgia)    Pneumococcal Vaccine Swelling     Fever and \"My arm swelled up like a balloon.\"    Varicella Zoster Immune Globulin Swelling      PAST MEDICAL HISTORY:   Past Medical History:   Diagnosis Date    Acute postoperative pain 09/11/2013    Alpha-1-antitrypsin deficiency (H)     Arthritis     Basal cell carcinoma     CMV (cytomegalovirus infection) (H)     Reacttivation Sept 2013 when valcyte held    Coronary artery disease     DVT of upper extremity (deep vein thrombosis) (H) 09/2013    Nonocclusive thrombosis extending from the right subclavian vein to the right axillary vein,  Segmental occlusion of right basilic vein in the upper arm. 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    With DVT and thrombocytopenia    Hypertension     Lung transplant status, bilateral (H) 09/08/2013    Complicated by HIT and esophageal dysfunction    Pneumonia of right lower lobe due to Pseudomonas species (H) 02/28/2019    Sepsis associated hypotension (H) 02/24/2019    Squamous cell carcinoma     Stented coronary artery     Steroid-induced diabetes mellitus (H24)     Thrombocytopaenia     due to HIT    Ureteral stone 10/17/2017      PAST SURGICAL HISTORY:   has a past surgical history that includes tonsillectomy; Repair iris (1970); cataract iol, rt/lt; ESOPH/GAS REFLUX TEST W NASAL IMPED >1 HR (08/02/2012); Transplant lung recipient single x2 (09/08/2013); " Esophagoscopy, gastroscopy, duodenoscopy (EGD), combined (09/12/2013); Bronchoscopy flexible and rigid (09/17/2013); Esophagoscopy, gastroscopy, duodenoscopy (EGD), dilatation, combined (11/06/2013); Mohs micrographic procedure; picc insertion (Left, 09/22/2014); Esophagoscopy, gastroscopy, duodenoscopy (EGD), combined; Esophagoscopy, gastroscopy, duodenoscopy (EGD), combined (N/A, 12/07/2015); Cystoscopy, retrogrades, insert stent ureter(s), combined (Left, 10/18/2017); Laser holmium lithotripsy ureter(s), insert stent, combined (Left, 11/09/2017); Lung surgery; colonoscopy (08/17/2018); IR OR Angiogram (08/16/2022); Angiogram (Bilateral, 08/16/2022); Endoscopic ultrasound upper gastrointestinal tract (GI) (N/A, 07/10/2023); Colonoscopy (N/A, 09/28/2023); Coronary Angiogram (N/A, 1/11/2024); Percutaneous Coronary Intervention (N/A, 1/11/2024); Percutaneous Coronary Intervention (N/A, 2/16/2024); Coronary Angiogram (N/A, 2/16/2024); Endoscopic ultrasound upper gastrointestinal tract (GI) (N/A, 6/5/2024); Femoral Artery - Tibial Artery Bypass Graft (Right, 8/1/2024); IR Lower Extremity Angiogram Bilateral (7/9/2024); PICC/Midline Placement (8/29/2024); and Amputate leg below knee (Right, 8/28/2024).  FAMILY HISTORY: family history includes Asthma in his mother and sister; C.A.D. in his mother; Cerebrovascular Disease in his father; Diabetes in his sister; Heart Failure in his mother; Hypertension in his daughter and sister; Other - See Comments in his daughter and sister.  SOCIAL HISTORY:   reports that he quit smoking about 38 years ago. His smoking use included cigarettes. He started smoking about 53 years ago. He has a 30 pack-year smoking history. He has been exposed to tobacco smoke. He has never used smokeless tobacco. He reports that he does not drink alcohol and does not use drugs.  Patient's living condition: lives with spouse    Post Discharge Medication Reconciliation Status:   MED REC REQUIRED  Post  Medication Reconciliation Status: medication reconcilation previously completed during another office visit       Current Outpatient Medications   Medication Sig Dispense Refill    acetaminophen (TYLENOL) 325 MG tablet Take 2 tablets (650 mg) by mouth every 6 hours as needed for mild pain 60 tablet 0    acyclovir (ZOVIRAX) 400 MG tablet TAKE 1 TABLET (400 MG) BY MOUTH 2 TIMES DAILY 60 tablet 3    albuterol (PROAIR HFA/PROVENTIL HFA/VENTOLIN HFA) 108 (90 Base) MCG/ACT inhaler Inhale 1-2 puffs into the lungs every 6 hours as needed for shortness of breath or wheezing 8.5 g 3    aspirin (ASA) 81 MG EC tablet Take 1 tablet (81 mg) by mouth daily 90 tablet 3    azithromycin (ZITHROMAX) 250 MG tablet Take 250 mg by mouth Every Mon, Wed, Fri Morning      blood glucose (NO BRAND SPECIFIED) test strip USE TO TEST BLOOD SUGAR 3 TIMES DAILY. DIAG CODE: E11.9 300 strip 3    Calcium Carbonate-Vitamin D 600-10 MG-MCG TABS Take 1 tablet by mouth 2 times daily (with meals) 60 tablet 11    carvedilol (COREG) 6.25 MG tablet TAKE 1 TABLET (6.25 MG) BY MOUTH 2 TIMES DAILY (WITH MEALS) 120 tablet 3    clopidogrel (PLAVIX) 75 MG tablet Take 1 tablet (75 mg) by mouth daily 90 tablet 3    dapsone (ACZONE) 25 MG tablet Take 2 tablets (50 mg) by mouth daily 180 tablet 3    diclofenac (VOLTAREN) 1 % topical gel Apply 2 g topically 2 times daily as needed for moderate pain      econazole nitrate 1 % external cream APPLY TOPICALLY DAILY TO FEET AND HEELS. 85 g 3    Ferrous Sulfate Dried (HIGH POTENCY IRON) 65 MG TABS Take 1 tablet by mouth every morning.      fludrocortisone (FLORINEF) 0.1 MG tablet Take 1 tablet (0.1 mg) by mouth daily 90 tablet 3    fluticasone-salmeterol (ADVAIR-HFA) 230-21 MCG/ACT inhaler Inhale 2 puffs into the lungs 2 times daily 8 g 11    furosemide (LASIX) 20 MG tablet Take 1 tablet (20 mg) by mouth daily 90 tablet 4    HYDROmorphone (DILAUDID) 2 MG tablet Take 0.5 tablets (1 mg) by mouth every 6 hours as needed for  severe pain. 20 tablet 0    insulin aspart (NOVOLOG PEN) 100 UNIT/ML pen Take 5 U am, 3 unit(s) non, 5 unit(s) pm of insulin within 30 minutes of start of breakfast, lunch, and dinner. Do not give if blood sugar is less than 70 mg/dl.      insulin glargine (LANTUS PEN) 100 UNIT/ML pen Inject 18 Units Subcutaneous every morning (before breakfast)      insulin pen needle (32G X 4 MM) 32G X 4 MM miscellaneous Use 4 pen needles daily or as directed. Dispense as insurance allows. Dx. Code: E09.9 400 each 11    loperamide (IMODIUM) 2 MG capsule Take 1 capsule (2 mg) by mouth 4 times daily as needed for diarrhea 120 capsule 12    magnesium gluconate (MAGONATE) 500 MG tablet Take 1 tablet (500 mg) by mouth at bedtime.      metoclopramide (REGLAN) 5 MG tablet Take 1 tablet (5 mg) by mouth every 6 hours as needed (nausea) 30 tablet 0    Microlet Lancets MISC CHECK BLOOD SUGAR FOUR TIMES DAILY E11.9 400 each 1    montelukast (SINGULAIR) 10 MG tablet Take 1 tablet (10 mg) by mouth every evening 90 tablet 3    multivitamin, therapeutic (THERA-VIT) TABS Take 1 tablet by mouth daily 30 tablet 12    omeprazole 20 MG tablet Take 20 mg by mouth 2 times daily.      order for DME Equipment being ordered: diabetic shoes 1 each 0    pantoprazole (PROTONIX) 40 MG EC tablet Take 1 tablet (40 mg) by mouth daily 90 tablet 1    predniSONE (DELTASONE) 5 MG tablet Take 5 mg by mouth every morning. In addition, take one half tablet (2.5 mg) in the evening      predniSONE (DELTASONE) 5 MG tablet Take 2.5 mg by mouth every evening. In addition, take 1 tablet (5 mg) in the morning.      psyllium (METAMUCIL/KONSYL) capsule Take 2 capsules by mouth 2 times daily.      rosuvastatin (CRESTOR) 40 MG tablet Take 20 mg by mouth Every Mon, Wed, Fri Morning      tacrolimus (GENERIC EQUIVALENT) 0.5 MG capsule Take 5 capsules (2.5 mg) by mouth every morning AND 5 capsules (2.5 mg) every evening.      venlafaxine (EFFEXOR XR) 37.5 MG 24 hr capsule Take 37.5 mg  "by mouth daily.      wound support modular (EXPEDITE) LIQD bottle Take 60 mLs by mouth daily.       No current facility-administered medications for this visit.       ROS:  10 point ROS of systems including Constitutional, Eyes, Respiratory, Cardiovascular, Gastroenterology, Genitourinary, Integumentary, Musculoskeletal, Psychiatric were all negative except for pertinent positives noted in my HPI.    Vitals:  BP (!) 176/96   Pulse 95   Temp 97.3  F (36.3  C)   Resp 18   Ht 1.727 m (5' 8\")   Wt 64.5 kg (142 lb 3.2 oz)   SpO2 94%   BMI 21.62 kg/m    Exam:  GENERAL APPEARANCE:  in no distress,   RESP:  Unlabored breathing. CTA b/l.   CV:  S1S2 audible, regular HR, no murmur appreciated.   ABDOMEN:  soft, NT/ND, BS audible.   M/S:   Right below-knee amputation.  Right leg prosthesis.  SKIN: Extensive dark brownish purplish discoloration over bilateral upper extremities and deep brownish discoloration over anterior surface of left leg.  Loss of hair over left leg.  NEURO:   No NFD appreciated on observation.   PSYCH:  affect and mood normal     Lab/Diagnostic data: Reviewed in the chart and EHR.         ASSESSMENT/PLAN:  -----------------------------  Status post below-knee amputation of right lower extremity (H)  Blood loss anemia  PAD (peripheral artery disease) (H24)  - surgical site healing.  Was not examined today as it was covered.  - analgesia optima with the current regimen  - continue current regimen  -surgical team follow up    Diabetes mellitus with peripheral vascular disease (H)   A1C 4.2 08/25/2024    A1C <4.0 04/03/2024   - way over controlled. Goal less than 7.5%  - continue to monitor BGs and adjust regimen accordingly    Major depressive disorder, single episode, moderate (H)  - started on venlafaxine.     History of lung transplant (H)  Immunocompromised state (H24)  Current chronic use of systemic steroids  Chronic obstructive pulmonary disease, unspecified COPD type (H)  - hx of acute " respiratory failure while inpatient. Hx of lung transplant. On different regimen.   -continue current regimen.  -specialities routine follow up      Essential hypertension  Atherosclerosis of coronary artery of native heart without angina pectoris, unspecified vessel or lesion type  - cardiac wise appears stable. The current medical regimen is effective;  continue present plan and medications.      Orders:   NNO    Electronically signed by:  Chanell Perry MD

## 2024-09-12 NOTE — LETTER
9/12/2024      Aubrey Duncan  [address]  [place] [address]        [facility] GERIATRICS  TCU FOLLOW UP VISIT    Chief Complaint   Patient presents with     RECHECK      Place of Service where encounter took place:  [facility] (TCU/SNF) [[id number]]    Aubrey Duncan  is a 69 year old  (1955), who is being seen today at the above facility to discuss progress in therapy, review nursing home EHR, recheck chronic medical problems as well as address any new concerns.       HPI:    Copied forward from previous note: admitted to the above facility from  [facility]. Hospital stay 8/25/24 through 9/4/24..  Admitted to this facility for  rehab, medical management, and nursing care.  Patient's living condition: lives with spouse  Brief Summary of Hospital Course: Admitted for a right lower extremity wound infection.  Required right BKA on 8/28 due to peripheral arterial disease.  Hospital course complicated by acute respiratory failure and aspiration pneumonitis, acute blood loss anemia requiring 2 units of packed red blood cells.  Past medical history of bilateral lung transplant in 2013 with immunosuppression.  CAD, HTN, chronic diarrhea, DM2 on insulin.  MEDICATION CHANGES: Start magnesium gluconate, Metamucil, expedite.  Changed Dilaudid and tacrolimus.  Stop amlodipine, lisinopril, Mag-Ox, sildenafil.  RECOMMENDED FOLLOW UP: Vascular surgery as directed  Updates on Status Since Skilled nursing Admission: 9/5 -CBC, BMP, admitted on limited  -Discontinue copper  -Venlafaxine ER 37.5 mg p.o. every day  -Hydroxyzine 10 mg p.o. every 8 hours as needed for anxiety  9/9 Change NovoLog to 5 units with breakfast and 3 units with lunch and supper   Therapy reports able to hop on 1 leg for 35 feet.  Therapy with set up.  Slums 28/30.     Today: Nurse reports no concerns.  Patient reports no concerns. Wife is present today for visit. Reviewed blood sugars with him and he states  "those are at his baseline.  He feels that his anxiety and depressed mood have improved since starting the venlafaxine.  Has not used any hydroxyzine.  He continues to use 2 hydromorphone per day.  Blood pressures 111//84.  Pulse 78-1 09.  Blood sugars a.m. 84-95, noon 114-289, p.m. 79-1 84, at bedtime .    Facility EHR reviewed including bm record, progress notes, vital signs and MAR.     ALLERGIES: Heparin, Oxycodone, Fluocinolone, Gabapentin, Levaquin, Pneumococcal vaccine, and Varicella zoster immune globulin    ROS:  4 point ROS including Respiratory, CV, GI and , other than that noted in the HPI,  is negative    Vitals:  BP (!) 131/91   Pulse 78   Temp 97.9  F (36.6  C)   Resp 18   Ht 1.727 m (5' 8\")   Wt 64.5 kg (142 lb 3.2 oz)   SpO2 98%   BMI 21.62 kg/m    Exam:  GENERAL APPEARANCE:  Alert, in no distress  RESP:  respiratory effort normal  CV:  edema none  M/S:  Gait and station lying in bed.  no tenderness or swelling of the joints   SKIN:  Inspection and palpation of skin and subcutaneous tissue at baseline.  PSYCH:  insight and judgement, memory intact, affect and mood normal    ASSESSMENT/PLAN:  Status post below-knee amputation of right lower extremity (H)  Admitted to the TCU for therapy and nursing care following hospital stay.  -Continue PT/Ocupational Therapy  Nursing for monitoring-  - following for discharge planning  -Continue with  and brace per surgeon  -orthotics per juan.  -Continue as needed Dilaudid for now     Type 2 diabetes mellitus with diabetic polyneuropathy, with long-term current use of insulin (H)  Continues on glargine 18 units once daily and aspart TID 5/3/5 units per meal.  Blood sugars before bed a little lower than needed.  Will decrease suppertime insulin and follow-up.  - monitor blood sugars and follow up.      PAD (peripheral artery disease) (H24)  Continues on Plavix and statin.  Has chronic ulcer of the fifth digit and new " ulcer of the second digit.  Has an angiogram scheduled for 9/20.  Has cream prescribed by surgeon - continue econazole.     Chronic kidney disease, stage 3b (H)  With SHELLEY during hospital stay.  Follow labs.     S/P lung transplant (H)  On tacrolimus and prednisone.  On prophylactic antibiotic and Valtrex.     Major depressive disorder, single episode, moderate (H)  Recently started venlafaxine.  Also has as needed hydroxyzine for anxiety and difficulty sleeping.  -Continue medications  -Continue supportive care    Orders:  No changes    Electronically signed by: Eva Clinton NP      Sincerely,        Eva Clinton NP

## 2024-09-12 NOTE — TELEPHONE ENCOUNTER
Tacrolimus level 5.8 at 19 hours from 9/11  Goal 8-10  Current dose 2.5/2.5   Pt has been getting his evening dose of tac at 4 PM    RN will change dosing time to 8AM and 8PM, repeat level tomorrow. Reviewed how this needs to be a 12 hour trough.

## 2024-09-13 ENCOUNTER — LAB REQUISITION (OUTPATIENT)
Dept: LAB | Facility: CLINIC | Age: 69
End: 2024-09-13
Payer: MEDICARE

## 2024-09-13 DIAGNOSIS — Z94.2 LUNG TRANSPLANT STATUS (H): ICD-10-CM

## 2024-09-13 LAB
TACROLIMUS BLD-MCNC: 5.3 UG/L (ref 5–15)
TME LAST DOSE: NORMAL H
TME LAST DOSE: NORMAL H

## 2024-09-13 PROCEDURE — P9604 ONE-WAY ALLOW PRORATED TRIP: HCPCS | Performed by: FAMILY MEDICINE

## 2024-09-13 PROCEDURE — 80197 ASSAY OF TACROLIMUS: CPT | Performed by: FAMILY MEDICINE

## 2024-09-13 PROCEDURE — 36415 COLL VENOUS BLD VENIPUNCTURE: CPT | Performed by: FAMILY MEDICINE

## 2024-09-16 ENCOUNTER — TELEPHONE (OUTPATIENT)
Dept: TRANSPLANT | Facility: CLINIC | Age: 69
End: 2024-09-16

## 2024-09-16 ENCOUNTER — TRANSITIONAL CARE UNIT VISIT (OUTPATIENT)
Dept: GERIATRICS | Facility: CLINIC | Age: 69
End: 2024-09-16
Payer: MEDICARE

## 2024-09-16 VITALS
HEIGHT: 68 IN | WEIGHT: 142.2 LBS | DIASTOLIC BLOOD PRESSURE: 88 MMHG | TEMPERATURE: 97.9 F | RESPIRATION RATE: 18 BRPM | HEART RATE: 78 BPM | BODY MASS INDEX: 21.55 KG/M2 | OXYGEN SATURATION: 98 % | SYSTOLIC BLOOD PRESSURE: 131 MMHG

## 2024-09-16 VITALS
HEIGHT: 68 IN | HEART RATE: 95 BPM | OXYGEN SATURATION: 94 % | SYSTOLIC BLOOD PRESSURE: 176 MMHG | WEIGHT: 142.2 LBS | BODY MASS INDEX: 21.55 KG/M2 | TEMPERATURE: 97.3 F | DIASTOLIC BLOOD PRESSURE: 96 MMHG | RESPIRATION RATE: 18 BRPM

## 2024-09-16 DIAGNOSIS — I25.10 ATHEROSCLEROSIS OF CORONARY ARTERY OF NATIVE HEART WITHOUT ANGINA PECTORIS, UNSPECIFIED VESSEL OR LESION TYPE: ICD-10-CM

## 2024-09-16 DIAGNOSIS — J44.9 CHRONIC OBSTRUCTIVE PULMONARY DISEASE, UNSPECIFIED COPD TYPE (H): ICD-10-CM

## 2024-09-16 DIAGNOSIS — N18.32 CHRONIC KIDNEY DISEASE, STAGE 3B (H): ICD-10-CM

## 2024-09-16 DIAGNOSIS — E11.42 TYPE 2 DIABETES MELLITUS WITH DIABETIC POLYNEUROPATHY, WITH LONG-TERM CURRENT USE OF INSULIN (H): ICD-10-CM

## 2024-09-16 DIAGNOSIS — F32.1 MAJOR DEPRESSIVE DISORDER, SINGLE EPISODE, MODERATE (H): ICD-10-CM

## 2024-09-16 DIAGNOSIS — E11.51 DIABETES MELLITUS WITH PERIPHERAL VASCULAR DISEASE (H): ICD-10-CM

## 2024-09-16 DIAGNOSIS — D84.9 IMMUNOCOMPROMISED STATE (H): ICD-10-CM

## 2024-09-16 DIAGNOSIS — I73.9 PAD (PERIPHERAL ARTERY DISEASE) (H): ICD-10-CM

## 2024-09-16 DIAGNOSIS — Z89.511 STATUS POST BELOW-KNEE AMPUTATION OF RIGHT LOWER EXTREMITY (H): Primary | ICD-10-CM

## 2024-09-16 DIAGNOSIS — Z79.52 CURRENT CHRONIC USE OF SYSTEMIC STEROIDS: ICD-10-CM

## 2024-09-16 DIAGNOSIS — Z94.2 HISTORY OF LUNG TRANSPLANT (H): ICD-10-CM

## 2024-09-16 DIAGNOSIS — Z79.4 TYPE 2 DIABETES MELLITUS WITH DIABETIC POLYNEUROPATHY, WITH LONG-TERM CURRENT USE OF INSULIN (H): ICD-10-CM

## 2024-09-16 DIAGNOSIS — D50.0 BLOOD LOSS ANEMIA: ICD-10-CM

## 2024-09-16 DIAGNOSIS — Z94.2 S/P LUNG TRANSPLANT (H): Chronic | ICD-10-CM

## 2024-09-16 DIAGNOSIS — I10 ESSENTIAL HYPERTENSION: Chronic | ICD-10-CM

## 2024-09-16 PROCEDURE — 99305 1ST NF CARE MODERATE MDM 35: CPT | Performed by: FAMILY MEDICINE

## 2024-09-16 RX ORDER — TACROLIMUS 1 MG/1
3 CAPSULE ORAL 2 TIMES DAILY
Qty: 540 CAPSULE | Refills: 3 | Status: SHIPPED | OUTPATIENT
Start: 2024-09-16

## 2024-09-16 NOTE — LETTER
" 9/16/2024      Aubrey Duncan  [address]  [place] [address]        [facility] GERIATRICS    PRIMARY CARE PROVIDER AND CLINIC:  Hoa Olivarez, BRANDI CNP, [address] / [place] [address]  Chief Complaint   Patient presents with     Hospital F/U      [facility] Medical Record Number:  [id number]  Place of Service where encounter took place:  [facility] (U/SNF) [[id number]]    Aubrey Duncan  is a 69 year old  (1955), admitted to the above facility from  [facility]. Hospital stay 8/15/24 through 9/4/24..   HPI:    According to GNP's note:\" Admitted for a right lower extremity wound infection.  Required right BKA on 8/28 due to peripheral arterial disease.  Hospital course complicated by acute respiratory failure and aspiration pneumonitis, acute blood loss anemia requiring 2 units of packed red blood cells.  Past medical history of bilateral lung transplant in 2013 with immunosuppression.  CAD, HTN, chronic diarrhea, DM2 on insulin.  MEDICATION CHANGES: Start magnesium gluconate, Metamucil, expedite.  Changed Dilaudid and tacrolimus.  Stop amlodipine, lisinopril, Mag-Ox, sildenafil.  RECOMMENDED FOLLOW UP: Vascular surgery as directed  Updates on Status Since Skilled nursing Admission: 9/5 -CBC, BMP, admitted on limited  - Discontinue copper  - Venlafaxine ER 37.5 mg p.o. every day  - Hydroxyzine 10 mg p.o. every 8 hours as needed for anxiety\"      TODAY:  -RBKA: Patient reports occasional very mild tingling feeling in the amputated leg.  Reports slight pressure from the otherwise no concerns.      -ABLA: Reports appetite is fine.  Not very fond of the food here.  He does cook at home.  He is working on getting some weight.    -Respiratory: Denies any concern.  Reports chronic chest pressure from taking deep breaths that since he had the lung transplant 2015.    - Anxiety and Depression: Reports now doing " "better.  =====================================================================    CODE STATUS/ADVANCE DIRECTIVES DISCUSSION:  Full Code    ALLERGIES:   Allergies   Allergen Reactions     Heparin Other (See Comments)     HIT positive and AUGUST positive. No Heparin Antibody Identified on 8/15 blood test.     Oxycodone Confusion     Significant lethargy. Tolerates Dilaudid well.      Fluocinolone Other (See Comments)     Tendon problems       Gabapentin Nausea and Vomiting     Levaquin Muscle Pain (Myalgia)     Pneumococcal Vaccine Swelling     Fever and \"My arm swelled up like a balloon.\"     Varicella Zoster Immune Globulin Swelling      PAST MEDICAL HISTORY:   Past Medical History:   Diagnosis Date     Acute postoperative pain 09/11/2013     Alpha-1-antitrypsin deficiency (H)      Arthritis      Basal cell carcinoma      CMV (cytomegalovirus infection) (H)     Reacttivation Sept 2013 when valcyte held     Coronary artery disease      DVT of upper extremity (deep vein thrombosis) (H) 09/2013    Nonocclusive thrombosis extending from the right subclavian vein to the right axillary vein,  Segmental occlusion of right basilic vein in the upper arm. 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    With DVT and thrombocytopenia     Hypertension      Lung transplant status, bilateral (H) 09/08/2013    Complicated by HIT and esophageal dysfunction     Pneumonia of right lower lobe due to Pseudomonas species (H) 02/28/2019     Sepsis associated hypotension (H) 02/24/2019     Squamous cell carcinoma      Stented coronary artery      Steroid-induced diabetes mellitus (H24)      Thrombocytopaenia     due to HIT     Ureteral stone 10/17/2017      PAST SURGICAL HISTORY:   has a past surgical history that includes tonsillectomy; " Repair iris (1970); cataract iol, rt/lt; ESOPH/GAS REFLUX TEST W NASAL IMPED >1 HR (08/02/2012); Transplant lung recipient single x2 (09/08/2013); Esophagoscopy, gastroscopy, duodenoscopy (EGD), combined (09/12/2013); Bronchoscopy flexible and rigid (09/17/2013); Esophagoscopy, gastroscopy, duodenoscopy (EGD), dilatation, combined (11/06/2013); Mohs micrographic procedure; picc insertion (Left, 09/22/2014); Esophagoscopy, gastroscopy, duodenoscopy (EGD), combined; Esophagoscopy, gastroscopy, duodenoscopy (EGD), combined (N/A, 12/07/2015); Cystoscopy, retrogrades, insert stent ureter(s), combined (Left, 10/18/2017); Laser holmium lithotripsy ureter(s), insert stent, combined (Left, 11/09/2017); Lung surgery; colonoscopy (08/17/2018); IR OR Angiogram (08/16/2022); Angiogram (Bilateral, 08/16/2022); Endoscopic ultrasound upper gastrointestinal tract (GI) (N/A, 07/10/2023); Colonoscopy (N/A, 09/28/2023); Coronary Angiogram (N/A, 1/11/2024); Percutaneous Coronary Intervention (N/A, 1/11/2024); Percutaneous Coronary Intervention (N/A, 2/16/2024); Coronary Angiogram (N/A, 2/16/2024); Endoscopic ultrasound upper gastrointestinal tract (GI) (N/A, 6/5/2024); Femoral Artery - Tibial Artery Bypass Graft (Right, 8/1/2024); IR Lower Extremity Angiogram Bilateral (7/9/2024); PICC/Midline Placement (8/29/2024); and Amputate leg below knee (Right, 8/28/2024).  FAMILY HISTORY: family history includes Asthma in his mother and sister; C.A.D. in his mother; Cerebrovascular Disease in his father; Diabetes in his sister; Heart Failure in his mother; Hypertension in his daughter and sister; Other - See Comments in his daughter and sister.  SOCIAL HISTORY:   reports that he quit smoking about 38 years ago. His smoking use included cigarettes. He started smoking about 53 years ago. He has a 30 pack-year smoking history. He has been exposed to tobacco smoke. He has never used smokeless tobacco. He reports that he does not drink alcohol and  does not use drugs.  Patient's living condition: lives with spouse    Post Discharge Medication Reconciliation Status:   MED REC REQUIRED  Post Medication Reconciliation Status: medication reconcilation previously completed during another office visit       Current Outpatient Medications   Medication Sig Dispense Refill     acetaminophen (TYLENOL) 325 MG tablet Take 2 tablets (650 mg) by mouth every 6 hours as needed for mild pain 60 tablet 0     acyclovir (ZOVIRAX) 400 MG tablet TAKE 1 TABLET (400 MG) BY MOUTH 2 TIMES DAILY 60 tablet 3     albuterol (PROAIR HFA/PROVENTIL HFA/VENTOLIN HFA) 108 (90 Base) MCG/ACT inhaler Inhale 1-2 puffs into the lungs every 6 hours as needed for shortness of breath or wheezing 8.5 g 3     aspirin (ASA) 81 MG EC tablet Take 1 tablet (81 mg) by mouth daily 90 tablet 3     azithromycin (ZITHROMAX) 250 MG tablet Take 250 mg by mouth Every Mon, Wed, Fri Morning       blood glucose (NO BRAND SPECIFIED) test strip USE TO TEST BLOOD SUGAR 3 TIMES DAILY. DIAG CODE: E11.9 300 strip 3     Calcium Carbonate-Vitamin D 600-10 MG-MCG TABS Take 1 tablet by mouth 2 times daily (with meals) 60 tablet 11     carvedilol (COREG) 6.25 MG tablet TAKE 1 TABLET (6.25 MG) BY MOUTH 2 TIMES DAILY (WITH MEALS) 120 tablet 3     clopidogrel (PLAVIX) 75 MG tablet Take 1 tablet (75 mg) by mouth daily 90 tablet 3     dapsone (ACZONE) 25 MG tablet Take 2 tablets (50 mg) by mouth daily 180 tablet 3     diclofenac (VOLTAREN) 1 % topical gel Apply 2 g topically 2 times daily as needed for moderate pain       econazole nitrate 1 % external cream APPLY TOPICALLY DAILY TO FEET AND HEELS. 85 g 3     Ferrous Sulfate Dried (HIGH POTENCY IRON) 65 MG TABS Take 1 tablet by mouth every morning.       fludrocortisone (FLORINEF) 0.1 MG tablet Take 1 tablet (0.1 mg) by mouth daily 90 tablet 3     fluticasone-salmeterol (ADVAIR-HFA) 230-21 MCG/ACT inhaler Inhale 2 puffs into the lungs 2 times daily 8 g 11     furosemide (LASIX) 20 MG  tablet Take 1 tablet (20 mg) by mouth daily 90 tablet 4     HYDROmorphone (DILAUDID) 2 MG tablet Take 0.5 tablets (1 mg) by mouth every 6 hours as needed for severe pain. 20 tablet 0     insulin aspart (NOVOLOG PEN) 100 UNIT/ML pen Take 5 U am, 3 unit(s) non, 5 unit(s) pm of insulin within 30 minutes of start of breakfast, lunch, and dinner. Do not give if blood sugar is less than 70 mg/dl.       insulin glargine (LANTUS PEN) 100 UNIT/ML pen Inject 18 Units Subcutaneous every morning (before breakfast)       insulin pen needle (32G X 4 MM) 32G X 4 MM miscellaneous Use 4 pen needles daily or as directed. Dispense as insurance allows. Dx. Code: E09.9 400 each 11     loperamide (IMODIUM) 2 MG capsule Take 1 capsule (2 mg) by mouth 4 times daily as needed for diarrhea 120 capsule 12     magnesium gluconate (MAGONATE) 500 MG tablet Take 1 tablet (500 mg) by mouth at bedtime.       metoclopramide (REGLAN) 5 MG tablet Take 1 tablet (5 mg) by mouth every 6 hours as needed (nausea) 30 tablet 0     Microlet Lancets MISC CHECK BLOOD SUGAR FOUR TIMES DAILY E11.9 400 each 1     montelukast (SINGULAIR) 10 MG tablet Take 1 tablet (10 mg) by mouth every evening 90 tablet 3     multivitamin, therapeutic (THERA-VIT) TABS Take 1 tablet by mouth daily 30 tablet 12     omeprazole 20 MG tablet Take 20 mg by mouth 2 times daily.       order for DME Equipment being ordered: diabetic shoes 1 each 0     pantoprazole (PROTONIX) 40 MG EC tablet Take 1 tablet (40 mg) by mouth daily 90 tablet 1     predniSONE (DELTASONE) 5 MG tablet Take 5 mg by mouth every morning. In addition, take one half tablet (2.5 mg) in the evening       predniSONE (DELTASONE) 5 MG tablet Take 2.5 mg by mouth every evening. In addition, take 1 tablet (5 mg) in the morning.       psyllium (METAMUCIL/KONSYL) capsule Take 2 capsules by mouth 2 times daily.       rosuvastatin (CRESTOR) 40 MG tablet Take 20 mg by mouth Every Mon, Wed, Fri Morning       tacrolimus (GENERIC  "EQUIVALENT) 0.5 MG capsule Take 5 capsules (2.5 mg) by mouth every morning AND 5 capsules (2.5 mg) every evening.       venlafaxine (EFFEXOR XR) 37.5 MG 24 hr capsule Take 37.5 mg by mouth daily.       wound support modular (EXPEDITE) LIQD bottle Take 60 mLs by mouth daily.       No current facility-administered medications for this visit.       ROS:  10 point ROS of systems including Constitutional, Eyes, Respiratory, Cardiovascular, Gastroenterology, Genitourinary, Integumentary, Musculoskeletal, Psychiatric were all negative except for pertinent positives noted in my HPI.    Vitals:  BP (!) 176/96   Pulse 95   Temp 97.3  F (36.3  C)   Resp 18   Ht 1.727 m (5' 8\")   Wt 64.5 kg (142 lb 3.2 oz)   SpO2 94%   BMI 21.62 kg/m    Exam:  GENERAL APPEARANCE:  in no distress,   RESP:  Unlabored breathing. CTA b/l.   CV:  S1S2 audible, regular HR, no murmur appreciated.   ABDOMEN:  soft, NT/ND, BS audible.   M/S:   Right below-knee amputation.  Right leg prosthesis.  SKIN: Extensive dark brownish purplish discoloration over bilateral upper extremities and deep brownish discoloration over anterior surface of left leg.  Loss of hair over left leg.  NEURO:   No NFD appreciated on observation.   PSYCH:  affect and mood normal     Lab/Diagnostic data: Reviewed in the chart and EHR.         ASSESSMENT/PLAN:  -----------------------------  Status post below-knee amputation of right lower extremity (H)  Blood loss anemia  PAD (peripheral artery disease) (H24)  - surgical site healing.  Was not examined today as it was covered.  - analgesia optima with the current regimen  - continue current regimen  -surgical team follow up    Diabetes mellitus with peripheral vascular disease (H)   A1C 4.2 08/25/2024    A1C <4.0 04/03/2024   - way over controlled. Goal less than 7.5%  - continue to monitor BGs and adjust regimen accordingly    Major depressive disorder, single episode, moderate (H)  - started on venlafaxine.     History of " lung transplant (H)  Immunocompromised state (H24)  Current chronic use of systemic steroids  Chronic obstructive pulmonary disease, unspecified COPD type (H)  - hx of acute respiratory failure while inpatient. Hx of lung transplant. On different regimen.   -continue current regimen.  -specialities routine follow up      Essential hypertension  Atherosclerosis of coronary artery of native heart without angina pectoris, unspecified vessel or lesion type  - cardiac wise appears stable. The current medical regimen is effective;  continue present plan and medications.      Orders:   NNO    Electronically signed by:  Chanell Perry MD                     Sincerely,        Chanell Perry MD

## 2024-09-16 NOTE — TELEPHONE ENCOUNTER
Tacrolimus level 5.3 at 13 hours, on 9/13/24.  Goal 8-10.   Current dose 2.5 mg in AM, 2.5 mg in PM    Dose changed to 3 mg in AM, 3 mg in PM   Recheck level Thursday.    Discussed with Priya CAMPA at [facility].  Orders faxed.

## 2024-09-16 NOTE — LETTER
PHYSICIAN ORDERS      DATE & TIME ISSUED: 2024 10:31 AM  PATIENT NAME: Aubrey Duncan   : 1955     [facility] MR# [if applicable]: [id number]     DIAGNOSIS:  Lung Transplant  Z94.2    24:  Please increase tacrolimus dose to 3 mg BID.    24:  Please recheck tacrolimus level (12 hour trough, note time of last dose)    Any questions please call: Luz [phone]    Please fax these results to [phone].      .  Valentino Cristina MD  Division of Pulmonary, Allergy, Critical Care and Sleep Medicine  Professor of Medicine

## 2024-09-16 NOTE — TELEPHONE ENCOUNTER
"Provider Call: General  Route to LPN    Reason for call: Call from [facility] in [place]. They received orders but pt is not in their system. [facility] are often separate entities and do not serve the same patients.    Orders received were: physician's order to increase tacrolimus dose; lab orders for tacrolimus level.    Please re-send orders to a different pharmacy. [place] has \"patient-docked\" these orders. Please call back if there are questions.    Call back needed? No    "

## 2024-09-16 NOTE — PROGRESS NOTES
[facility] GERIATRICS  TCU FOLLOW UP VISIT    Chief Complaint   Patient presents with    RECHECK      Place of Service where encounter took place:  [facility] (TCU/SNF) [[id number]]    Aubrey Duncan  is a 69 year old  (1955), who is being seen today at the above facility to discuss progress in therapy, review nursing home EHR, recheck chronic medical problems as well as address any new concerns.       HPI:    Copied forward from previous note: admitted to the above facility from  [facility]. Hospital stay 8/25/24 through 9/4/24..  Admitted to this facility for  rehab, medical management, and nursing care.  Patient's living condition: lives with spouse  Brief Summary of Hospital Course: Admitted for a right lower extremity wound infection.  Required right BKA on 8/28 due to peripheral arterial disease.  Hospital course complicated by acute respiratory failure and aspiration pneumonitis, acute blood loss anemia requiring 2 units of packed red blood cells.  Past medical history of bilateral lung transplant in 2013 with immunosuppression.  CAD, HTN, chronic diarrhea, DM2 on insulin.  MEDICATION CHANGES: Start magnesium gluconate, Metamucil, expedite.  Changed Dilaudid and tacrolimus.  Stop amlodipine, lisinopril, Mag-Ox, sildenafil.  RECOMMENDED FOLLOW UP: Vascular surgery as directed  Updates on Status Since Skilled nursing Admission: 9/5 -CBC, BMP, admitted on limited  -Discontinue copper  -Venlafaxine ER 37.5 mg p.o. every day  -Hydroxyzine 10 mg p.o. every 8 hours as needed for anxiety  9/9 Change NovoLog to 5 units with breakfast and 3 units with lunch and supper   Therapy reports able to hop on 1 leg for 35 feet.  Therapy with set up.  Slums 28/30.     Today: Nurse reports no concerns.  Patient reports no concerns. Wife is present today for visit. Reviewed blood sugars with him and he states those are at his baseline.  He feels that his anxiety and depressed mood  "have improved since starting the venlafaxine.  Has not used any hydroxyzine.  He continues to use 2 hydromorphone per day.  Blood pressures 111//84.  Pulse 78-1 09.  Blood sugars a.m. 84-95, noon 114-289, p.m. 79-1 84, at bedtime .    Facility EHR reviewed including bm record, progress notes, vital signs and MAR.     ALLERGIES: Heparin, Oxycodone, Fluocinolone, Gabapentin, Levaquin, Pneumococcal vaccine, and Varicella zoster immune globulin    ROS:  4 point ROS including Respiratory, CV, GI and , other than that noted in the HPI,  is negative    Vitals:  BP (!) 131/91   Pulse 78   Temp 97.9  F (36.6  C)   Resp 18   Ht 1.727 m (5' 8\")   Wt 64.5 kg (142 lb 3.2 oz)   SpO2 98%   BMI 21.62 kg/m    Exam:  GENERAL APPEARANCE:  Alert, in no distress  RESP:  respiratory effort normal  CV:  edema none  M/S:  Gait and station lying in bed.  no tenderness or swelling of the joints   SKIN:  Inspection and palpation of skin and subcutaneous tissue at baseline.  PSYCH:  insight and judgement, memory intact, affect and mood normal    ASSESSMENT/PLAN:  Status post below-knee amputation of right lower extremity (H)  Admitted to the TCU for therapy and nursing care following hospital stay.  -Continue PT/Ocupational Therapy  Nursing for monitoring-  - following for discharge planning  -Continue with  and brace per surgeon  -orthotics per suregeon.  -Continue as needed Dilaudid for now     Type 2 diabetes mellitus with diabetic polyneuropathy, with long-term current use of insulin (H)  Continues on glargine 18 units once daily and aspart TID 5/3/5 units per meal.  Blood sugars before bed a little lower than needed.  Will decrease suppertime insulin and follow-up.  - monitor blood sugars and follow up.      PAD (peripheral artery disease) (H24)  Continues on Plavix and statin.  Has chronic ulcer of the fifth digit and new ulcer of the second digit.  Has an angiogram scheduled for 9/20.  Has cream " prescribed by surgeon - continue econazole.     Chronic kidney disease, stage 3b (H)  With SHELLEY during hospital stay.  Follow labs.     S/P lung transplant (H)  On tacrolimus and prednisone.  On prophylactic antibiotic and Valtrex.     Major depressive disorder, single episode, moderate (H)  Recently started venlafaxine.  Also has as needed hydroxyzine for anxiety and difficulty sleeping.  -Continue medications  -Continue supportive care    Orders:  No changes    Electronically signed by: Eva Clinton NP

## 2024-09-18 ENCOUNTER — TELEPHONE (OUTPATIENT)
Dept: VASCULAR SURGERY | Facility: CLINIC | Age: 69
End: 2024-09-18
Payer: MEDICARE

## 2024-09-18 ENCOUNTER — TRANSITIONAL CARE UNIT VISIT (OUTPATIENT)
Dept: GERIATRICS | Facility: CLINIC | Age: 69
End: 2024-09-18
Payer: MEDICARE

## 2024-09-18 ENCOUNTER — LAB REQUISITION (OUTPATIENT)
Dept: LAB | Facility: CLINIC | Age: 69
End: 2024-09-18
Payer: MEDICARE

## 2024-09-18 VITALS
BODY MASS INDEX: 20.19 KG/M2 | HEART RATE: 82 BPM | OXYGEN SATURATION: 97 % | DIASTOLIC BLOOD PRESSURE: 87 MMHG | HEIGHT: 68 IN | WEIGHT: 133.2 LBS | RESPIRATION RATE: 14 BRPM | SYSTOLIC BLOOD PRESSURE: 162 MMHG | TEMPERATURE: 97.4 F

## 2024-09-18 DIAGNOSIS — E11.42 TYPE 2 DIABETES MELLITUS WITH DIABETIC POLYNEUROPATHY, WITH LONG-TERM CURRENT USE OF INSULIN (H): ICD-10-CM

## 2024-09-18 DIAGNOSIS — Z79.4 TYPE 2 DIABETES MELLITUS WITH DIABETIC POLYNEUROPATHY, WITH LONG-TERM CURRENT USE OF INSULIN (H): ICD-10-CM

## 2024-09-18 DIAGNOSIS — Z89.511 STATUS POST BELOW-KNEE AMPUTATION OF RIGHT LOWER EXTREMITY (H): Primary | ICD-10-CM

## 2024-09-18 DIAGNOSIS — N18.32 CHRONIC KIDNEY DISEASE, STAGE 3B (H): ICD-10-CM

## 2024-09-18 DIAGNOSIS — Z94.2 LUNG TRANSPLANT STATUS (H): ICD-10-CM

## 2024-09-18 DIAGNOSIS — I73.9 PAD (PERIPHERAL ARTERY DISEASE) (H): ICD-10-CM

## 2024-09-18 DIAGNOSIS — Z94.2 S/P LUNG TRANSPLANT (H): ICD-10-CM

## 2024-09-18 PROCEDURE — 99308 SBSQ NF CARE LOW MDM 20: CPT | Performed by: NURSE PRACTITIONER

## 2024-09-18 NOTE — LETTER
9/18/2024      Aubrey Duncan  [address]  [place] [address]        [facility] GERIATRICS  Face to Face and Medical Necessity Statement for DME Provider visit    Patient: Aubrey Duncan  Gender: male  YOB: 1955  [facility] Medical Record Number: [id number]  Demographics:  [address]  [place] [address]  [phone] (home)   Social Security Number: [id number]  Primary Care Provider: Hoa Olivarez  Insurance: Payor: MEDICARE / Plan: MEDICARE / Product Type: Medicare /     HPI: Aubrey Duncan is a 69 year old (1955), who is being seen today for a face to face provider visit at [facility] (U/SNF) [4000]. Medical necessity statement for DME included.     This patient requires the following: DME Ordered and Medical Necessity Statement     Wheelchair Documentation  Size: standard 16 x 16  Corresponding cushion: Yes: standard  Standard foot rests: No  Elevating leg rests: Yes  Arm rests: Yes: right amputee swing away support  Lap tray: No  Dose the patient use oxygen? No   Is the patient able to propel wheelchair? Yes   1. The patient has mobility limitations that impairs their ability to participate in one or more mobility related activities: Toileting, Feeding, Grooming, and Bathing.  The wheelchair is suitable and necessary for use in the patient's home.  2. The patient's mobility limitations cannot be safely resolved by using a cane/walker:Yes    Reason why a cane or walker will not meet the patient's needs. (ie: balance, tolerance, level of assistance) poor balance for longer distances.   3. The patients home has adequate access to use a manual wheelchair:Yes  4. The use of a manual wheelchair on a regular basis will improve the patients ability to participate in mobility related ADL's at home:Yes  5. The patient is willing to use a manual wheelchair at home:Yes  6. The patient has adequate upper body strength and the mental capability to safely use a manual wheelchair  "and/or has a caregiver that is able to assist: Yes  7. Does the patient have a lower extremity injury or edema?Yes right BKA.       Pt needing above DME with expected length of need of 99 months due to medical necessity associated with following diagnosis:     PAD (peripheral artery disease) (H24)  Status post below-knee amputation of right lower extremity (H)  Type 2 diabetes mellitus with diabetic polyneuropathy, with long-term current use of insulin (H)  Chronic kidney disease, stage 3b (H)  S/P lung transplant (H)      Past Medical History:   has a past medical history of Acute postoperative pain (09/11/2013), Alpha-1-antitrypsin deficiency (H), Arthritis, Basal cell carcinoma, CMV (cytomegalovirus infection) (H), Coronary artery disease, DVT of upper extremity (deep vein thrombosis) (H) (09/2013), Esophageal spasm (09/2013), Esophageal stricture, Gastroesophageal reflux disease, Gout (01/31/2018), 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    He has no past medical history of Cerebral infarction (H), Complication of anesthesia, Congestive heart failure (H), Malignant hyperthermia, Motion sickness, Sleep apnea, or Thyroid disease.    Review of Systems:  4 point ROS including Respiratory, CV, GI and , other than that noted in the HPI,  is negative    Exam:  Vitals: BP (!) 162/87   Pulse 82   Temp 97.4  F (36.3  C)   Resp 14   Ht 1.727 m (5' 8\")   Wt 60.4 kg (133 lb 3.2 oz)   SpO2 97%   BMI 20.25 kg/m    BMI: Body mass index is 20.25 kg/m .  GENERAL APPEARANCE:  Alert, in no distress  RESP:  respiratory effort normal  CV:  edema none  M/S:  Gait and station lying in bed.  " no tenderness or swelling of the joints. Right BKA.   SKIN:  Inspection and palpation of skin and subcutaneous tissue at baseline.  PSYCH:  insight and judgement, memory intact, affect and mood normal      Assessment/Plan:  1. Status post below-knee amputation of right lower extremity (H)    2. PAD (peripheral artery disease) (H24)    3. Type 2 diabetes mellitus with diabetic polyneuropathy, with long-term current use of insulin (H)    4. Chronic kidney disease, stage 3b (H)    5. S/P lung transplant (H)        Orders:  1. Facility staff/TC to contact DME company to get their order form for provider to fill out    ELECTRONICALLY SIGNED BY [employer] CERTIFIED PROVIDER: Eav Clinton NP   NPI: [id number]  [facility] [address] [place] [address]      Sincerely,        Eva Clinton NP

## 2024-09-18 NOTE — TELEPHONE ENCOUNTER
Caller: [facility]    Provider: MD Grace Sneed    Detailed reason for call: Care facility requesting all pre and post-op instructions be faxed to them that includes: medication instructions (plavix, aspirin, insulin), bathing instructions, and any other surgery prep instructions. FAX: [phone]    Best phone number to contact: [phone]    Best time to contact: Any time    Ok to leave a detailed message: Yes    Ok to speak to authorized person if needed: N/A

## 2024-09-18 NOTE — LETTER
September 18, 2024      Aubrey Duncan  [address]  [place] [address]        Aubrey Duncan,    Your visit to [facility] for your procedure is coming soon and we look forward to seeing you! This friendly reminder and pre-procedure checklist will help to ensure your procedure goes smoothly and meets your expectations. At [facility], our goal is to provide you with a great patient experience and to deliver genuine, professional care to every patient.     Please complete all the steps in advance of your visit. If you have any questions about the items listed below, please give our office a call. We can be reached at [phone] or visit our website at [email or web address] for more information.     Procedure: Left  Leg  Angiogram     Procedure Date :  09/20/2024    Procedure Time :  8 AM    Arrival Time: 7 AM     Admission Type: Outpatient    Surgeon:     Procedure Location: [facility]:  [address] [place] [id number] (phone: [phone], Fax: [phone])          If you take blood thinners: SEE SPECIFIC INSTRUCTIONS BELOW   PLEASE DO NOT STOP YOUR ASPIRIN OR PLAVIX UNLESS SPECIFICALLY DIRECTED BY THE VASCULAR SURGEON TO STOP!  - In most cases Vascular providers want you to continue these. This is different from most NON vascular surgeries and may not be well known by your Primary Care Provider     If you take these diabetic medications, please discuss with your primary doctor and follow the hold instructions:   Hold seven (7) days prior for once weekly injectable doses [semaglutide (Ozempic, Wegovy), dulaglutide  (Trulicity), exenatide ER (Bydureon), tirzepatide (Mounjaro)]  Hold the day before and day of for once daily injectable GLP-1 agonists [exenatide (Byetta), liraglutide (Saxenda,Victoza)]  Hold seven (7) days for oral semaglutide (Rybelsus)    Prepare for the peripheral angiography as follows:  Do  not eat 8 hours before your procedure. You may have clear liquids up to 1 hour before surgery.  Tell your healthcare provider about all medicines you take and any allergies you may have.  Arrange for a family member or friend to drive you home.  If you are on Metformin, please HOLD for 48 hours after the procedure.  Please be sure to address your diabetic medications with your Primary Care Physician prior to your angiogram.    Peripheral Angiography    Peripheral angiography is an outpatient procedure that makes a  map  of the vessels (arteries) in your lower body, legs, and arms, using X-ray and dye.This map can show where blood flow may be blocked.    An angiogram is commonly performed under sedation with the use of local anesthesia.    The procedure usually starts with a needle put into the femoral (groin) artery. From one treatment site, areas all over the body can be treated.  After access is established, catheters (thin tubes) and wires are threaded through the arterial system to a specific area of interest or throughout the entire body.  As a contrast agent (iodine dye) is injected, X-ray images are taken to let your provider view the flow of the dye and identify blockages. The surgeon can then choose the best mode of therapy for you - whether during or following the angiogram. This decision depends on your symptoms and the severity and characteristics of the blockages.  Two common therapies that can be provided during the angiogram are balloon angioplasty and stent placement.                   Angioplasty can be used to open arterial blockages. Guided by X-ray, your provider navigates through the blockage with a wire and introduces a special device equipped with an inflatable balloon. After positioning the balloon device across the blocked portion of the artery, the provider inflates the balloon to expand the artery and compress the blockage. The balloon is then deflated and removed while keeping the wire in  place across the area that has been treated. Next, contrast dye is injected to assess the result. Treatment is considered a success if blood flow is improved and less than 30% of the blockage remains. If the vessel is still considerably narrowed, placing a stent may be the next step.    Stents are used to prop open an artery at the site of a narrowing. Stents are generally placed after balloon angioplasty when there is residual narrowing or insufficient blood flow in a treated vessel. Stents are considered a permanent implant and cannot be used if you have a metal allergy. Stents that are used in the leg are constructed of a nickel-titanium alloy (Nitinol), a memory-shaped metal. This alloy has a predetermined size and shape at body temperature and expands to this size and shape after being introduced through a catheter. These stents resist kinking and are flexible so that damage from activities that involve your legs is minimized.  Your procedure may require other techniques to address the problem or plaque.     If surgery is felt to be a better option, your vascular provider will obtain any additional X-ray images needed to plan a surgical bypass of the blocked vessel/s and will then conclude the angiogram.      During the procedure  Here is what to expect:  You may get medicine through an IV (intravenous) line to relax you. You re given an injection to numb the insertion site. Then, a tiny skin cut (incision) is made near an artery in your groin.  Your provider inserts a thin tube (catheter) through the incision. He or she then threads the catheter into an artery while looking at a video monitor.  Contrast  dye  is injected into the catheter to confirm position. You may feel warmth or pressure in your legs and back. You lie still as X-rays are taken. The catheter is then taken out.  After the procedure  You ll be taken to a recovery area. A healthcare provider will apply pressure to the site for about 10 minutes.  Your healthcare provider will tell you how long to lie down and keep the insertion site still. Your healthcare provider will discuss the results with you soon after the procedure.      Angiogram Procedure Discharge Instructions:     1. If you received sedation for your procedure: Do not drive or operate heavy machinery for the rest of the day.  2. Avoid strenuous activity for 72 hours (3 days):                        - Do not lift greater than 10 pounds.                        - Excessive exercise                        - Straining                        - Return to your normal activities as you tolerate after the 3 days restriction  3. Avoid tub baths, Jacuzzis, hot tubs and pools for 72 hours (3 days) or until puncture site is will healed.  4. You may shower beginning tomorrow. Do not scrub puncture site(s) until well healed, pat dry.  5. You can expect to return to work 1-2 days after your procedure - depending on the nature of your profession.  6. It is normal to have some tenderness and minimal swelling at puncture site. A small area of discoloration may be present. Tenderness typically subsides in 24-48 hours. A small knot may also be present at puncture site for 6-8 weeks, this can be a normal part of the healing process.       After the angiogram If you:      1. Experience any bleeding or active swelling from puncture site: Lie down, firmly apply pressure to puncture site and CALL 9-1-1  2. Fever greater than 101 degrees Fahrenheit.  3. Redness, swelling, warmth to touch, or purulent (yellow/green/foul smelling) drainage from the puncture site.  4. Increasing pain, tenderness or swelling at puncture site OR of arm/leg near puncture site.  5. Feeling weak or faint.  6. Change in color, temperature, or sensation of arm/leg where puncture was made.  7. You can t feel your thrill (pulse at your dialysis fistula site) or it feels weak (If you had fistulogram done).     Call us with any other questions or concerns  "after your procedure: [phone]      All invasive procedures can have complications. While the risk of an angiogram is low it is not zero. The most common complications are related to the arterial access site.       Risks/ Complications   Bruising is Common  You will likely have bruising (ecchymosis) where the artery was entered.    Pain and Bleeding  Less commonly, patients experience pain and bleeding that may include blood collecting under the skin (hematoma).    Blockage and Leakage   In rare cases, the access artery can become blocked. Infrequently, patients experience persistent leakage of blood where the artery was entered, which can result in the formation of a pseudoaneurysm--a blood-filled sac--that may require further treatment.  Other complications related to an angiogram include:   Allergic reaction to the iodine contrast dye, which can lead to the development of kidney failure.  Very rarely during balloon angioplasty and/or stent placement, part of the arterial blockage can break off (embolism) and travel to more distant arteries. This can worsen blood flow.    Pre-Procedure checklist:    [] A Pre-op physical within 30 days of the procedure is required. You will need to set up an appointment with your primary care provider.  [] Contact your insurance regarding coverage  If you would like a Good Natalya Estimate for your upcoming procedure at [facility], contact Cost of Care Estimates   Advocates are available Monday through Friday 8am - 5pm     You may also submit a request online at [email or web address]  - Complete the secure online form found under \"Services and Procedure Pricing\"   If your procedure is at [facility], please contact the numbers below for Cost of Care Estimates.   - Facility Charge: [phone]    Anesthesiology charge:  [phone]   [] DO NOT BRING FMLA WITH TO SURGERY.  These should be sent to the provider's office by fax to " [phone].     [] Day of Surgery  Medications - Take as indicated with sips of water.   Wear comfortable loose-fitting clothes. Wear your glasses-Not contacts. Do not wear jewelry and remove body piercings. Surgery may be cancelled if they are not removed.   You may have 1 family member wait in the lobby during your surgery. Visitor restrictions are subject to change. Please verify with the surgery nurse when they call.   If same day surgery-Have a someone come with you to surgery that can help you understand the surgeon's instructions, drive you home and stay with you overnight the first night.    [] You will receive a call from a nurse 1-3 days prior to the procedure. They will go over more details with you    Notify our office right away, if you have any changes in your health status, or if you develop a cold, flu, diarrhea, infection, fever or sore throat before your scheduled surgery date. We can be reached at  [phone] Monday-Friday 8 am-4:30 pm if you have any questions.

## 2024-09-19 ENCOUNTER — TELEPHONE (OUTPATIENT)
Dept: VASCULAR SURGERY | Facility: CLINIC | Age: 69
End: 2024-09-19
Payer: MEDICARE

## 2024-09-19 DIAGNOSIS — I70.212 ATHEROSCLEROSIS OF NATIVE ARTERIES OF EXTREMITIES WITH INTERMITTENT CLAUDICATION, LEFT LEG (H): Primary | ICD-10-CM

## 2024-09-19 DIAGNOSIS — I73.9 PAD (PERIPHERAL ARTERY DISEASE) (H): ICD-10-CM

## 2024-09-19 PROBLEM — Z89.511 STATUS POST BELOW-KNEE AMPUTATION OF RIGHT LOWER EXTREMITY (H): Status: ACTIVE | Noted: 2024-09-19

## 2024-09-19 LAB
TACROLIMUS BLD-MCNC: 10.7 UG/L (ref 5–15)
TME LAST DOSE: NORMAL H
TME LAST DOSE: NORMAL H

## 2024-09-19 PROCEDURE — P9604 ONE-WAY ALLOW PRORATED TRIP: HCPCS | Performed by: FAMILY MEDICINE

## 2024-09-19 PROCEDURE — 80197 ASSAY OF TACROLIMUS: CPT | Performed by: FAMILY MEDICINE

## 2024-09-19 PROCEDURE — 36415 COLL VENOUS BLD VENIPUNCTURE: CPT | Performed by: FAMILY MEDICINE

## 2024-09-19 RX ORDER — NALOXONE HYDROCHLORIDE 0.4 MG/ML
0.4 INJECTION, SOLUTION INTRAMUSCULAR; INTRAVENOUS; SUBCUTANEOUS
Status: DISCONTINUED | OUTPATIENT
Start: 2024-09-19 | End: 2024-09-21 | Stop reason: HOSPADM

## 2024-09-19 RX ORDER — FENTANYL CITRATE 50 UG/ML
25-50 INJECTION, SOLUTION INTRAMUSCULAR; INTRAVENOUS EVERY 5 MIN PRN
Status: DISCONTINUED | OUTPATIENT
Start: 2024-09-19 | End: 2024-09-21 | Stop reason: HOSPADM

## 2024-09-19 RX ORDER — NALOXONE HYDROCHLORIDE 0.4 MG/ML
0.2 INJECTION, SOLUTION INTRAMUSCULAR; INTRAVENOUS; SUBCUTANEOUS
Status: DISCONTINUED | OUTPATIENT
Start: 2024-09-19 | End: 2024-09-21 | Stop reason: HOSPADM

## 2024-09-19 RX ORDER — ONDANSETRON 2 MG/ML
4 INJECTION INTRAMUSCULAR; INTRAVENOUS
Status: COMPLETED | OUTPATIENT
Start: 2024-09-19 | End: 2024-09-20

## 2024-09-19 RX ORDER — FLUMAZENIL 0.1 MG/ML
0.2 INJECTION, SOLUTION INTRAVENOUS
Status: DISCONTINUED | OUTPATIENT
Start: 2024-09-19 | End: 2024-09-21 | Stop reason: HOSPADM

## 2024-09-19 NOTE — TELEPHONE ENCOUNTER
Procedure: LLE angiogram    Date: 9/20/24    Surgeon: Dr. Grace Sneed    Called patient to review upcoming procedure. Reviewed visitor allowance of 1 person at this time.     Discussed procedure with patients and instructions: Yes    Reviewed Post Procedure Follow up plan and Appts made: Yes    Reviewed Covid Test Scheduled/Completed: NA    Reviewed Blood Thinners and plan for holding, continuing and/or bridging:Plavix - Please continue taking. You do NOT need to hold this prior to your procedure.  and Other- Ok to continue taking aspirin    Reviewed Pre Op Appointment Required and Completed: N/A    Reviewed Allergies and if Contrast Allergy-Instructions and plan: Yes: No contrast allergy    Reviewed Arrival Time of 7am and procedure time of 8am and location of procedure [facility]:  [facility] [place] [address] (phone: [phone], Fax: [phone])    Please park in Lot [address]. Enter through the main entrance. Check in at the Welcome Desk and you will be directed to the surgery unit.         All questions answered and number provided if any further questions arise or changes in patient status.

## 2024-09-19 NOTE — PROGRESS NOTES
[facility]  Face to Face and Medical Necessity Statement for DME Provider visit    Patient: Aubrey Duncan  Gender: male  YOB: 1955  [facility] Medical Record Number: [id number]  Demographics:  [address]  [place] [address]  [phone] (home)   Social Security Number: [id number]  Primary Care Provider: Hoa Olivarez  Insurance: Payor: MEDICARE / Plan: MEDICARE / Product Type: Medicare /     HPI: Aubrey Duncan is a 69 year old (1955), who is being seen today for a face to face provider visit at [facility] (TCU/SNF) [[id number]]. Medical necessity statement for DME included.     This patient requires the following: DME Ordered and Medical Necessity Statement     Wheelchair Documentation  Size: standard 16 x 16  Corresponding cushion: Yes: standard  Standard foot rests: No  Elevating leg rests: Yes  Arm rests: Yes: right amputee swing away support  Lap tray: No  Dose the patient use oxygen? No   Is the patient able to propel wheelchair? Yes   1. The patient has mobility limitations that impairs their ability to participate in one or more mobility related activities: Toileting, Feeding, Grooming, and Bathing.  The wheelchair is suitable and necessary for use in the patient's home.  2. The patient's mobility limitations cannot be safely resolved by using a cane/walker:Yes    Reason why a cane or walker will not meet the patient's needs. (ie: balance, tolerance, level of assistance) poor balance for longer distances.   3. The patients home has adequate access to use a manual wheelchair:Yes  4. The use of a manual wheelchair on a regular basis will improve the patients ability to participate in mobility related ADL's at home:Yes  5. The patient is willing to use a manual wheelchair at home:Yes  6. The patient has adequate upper body strength and the mental capability to safely use a manual wheelchair and/or has a caregiver that is able to assist: Yes  7. Does the  "patient have a lower extremity injury or edema?Yes right BKA.       Pt needing above DME with expected length of need of 99 months due to medical necessity associated with following diagnosis:     PAD (peripheral artery disease) (H24)  Status post below-knee amputation of right lower extremity (H)  Type 2 diabetes mellitus with diabetic polyneuropathy, with long-term current use of insulin (H)  Chronic kidney disease, stage 3b (H)  S/P lung transplant (H)      Past Medical History:   has a past medical history of Acute postoperative pain (09/11/2013), Alpha-1-antitrypsin deficiency (H), Arthritis, Basal cell carcinoma, CMV (cytomegalovirus infection) (H), Coronary artery disease, DVT of upper extremity (deep vein thrombosis) (H) (09/2013), Esophageal spasm (09/2013), Esophageal stricture, Gastroesophageal reflux disease, Gout (01/31/2018), 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    He has no past medical history of Cerebral infarction (H), Complication of anesthesia, Congestive heart failure (H), Malignant hyperthermia, Motion sickness, Sleep apnea, or Thyroid disease.    Review of Systems:  4 point ROS including Respiratory, CV, GI and , other than that noted in the HPI,  is negative    Exam:  Vitals: BP (!) 162/87   Pulse 82   Temp 97.4  F (36.3  C)   Resp 14   Ht 1.727 m (5' 8\")   Wt 60.4 kg (133 lb 3.2 oz)   SpO2 97%   BMI 20.25 kg/m    BMI: Body mass index is 20.25 kg/m .  GENERAL APPEARANCE:  Alert, in no distress  RESP:  respiratory effort normal  CV:  edema none  M/S:  Gait and station lying in bed.  no tenderness or swelling of the joints. Right BKA.   SKIN:  " Inspection and palpation of skin and subcutaneous tissue at baseline.  PSYCH:  insight and judgement, memory intact, affect and mood normal      Assessment/Plan:  1. Status post below-knee amputation of right lower extremity (H)    2. PAD (peripheral artery disease) (H24)    3. Type 2 diabetes mellitus with diabetic polyneuropathy, with long-term current use of insulin (H)    4. Chronic kidney disease, stage 3b (H)    5. S/P lung transplant (H)        Orders:  1. Facility staff/TC to contact DME company to get their order form for provider to fill out    ELECTRONICALLY SIGNED BY ETHAN CERTIFIED PROVIDER: Eva Clinton NP   NPI: [id number]  [facility] [address] [place] [address]

## 2024-09-20 ENCOUNTER — HOSPITAL ENCOUNTER (INPATIENT)
Facility: HOSPITAL | Age: 69
LOS: 10 days | Discharge: NURSING, REHAB OR LONG-TERM CARE | DRG: 239 | End: 2024-10-01
Attending: FAMILY MEDICINE | Admitting: INTERNAL MEDICINE
Payer: MEDICARE

## 2024-09-20 ENCOUNTER — HOSPITAL ENCOUNTER (OUTPATIENT)
Dept: INTERVENTIONAL RADIOLOGY/VASCULAR | Facility: HOSPITAL | Age: 69
Discharge: HOME | End: 2024-09-20
Attending: SURGERY
Payer: MEDICARE

## 2024-09-20 ENCOUNTER — APPOINTMENT (OUTPATIENT)
Dept: CT IMAGING | Facility: HOSPITAL | Age: 69
DRG: 239 | End: 2024-09-20
Attending: FAMILY MEDICINE
Payer: MEDICARE

## 2024-09-20 VITALS
SYSTOLIC BLOOD PRESSURE: 132 MMHG | BODY MASS INDEX: 21.19 KG/M2 | HEIGHT: 67 IN | DIASTOLIC BLOOD PRESSURE: 77 MMHG | HEART RATE: 88 BPM | RESPIRATION RATE: 25 BRPM | OXYGEN SATURATION: 94 % | WEIGHT: 135 LBS | TEMPERATURE: 97.7 F

## 2024-09-20 DIAGNOSIS — I73.9 PAD (PERIPHERAL ARTERY DISEASE) (H): ICD-10-CM

## 2024-09-20 DIAGNOSIS — I70.209 ARTERIAL OCCLUSION, LOWER EXTREMITY (H): ICD-10-CM

## 2024-09-20 DIAGNOSIS — I26.99 OTHER ACUTE PULMONARY EMBOLISM WITHOUT ACUTE COR PULMONALE (H): Primary | ICD-10-CM

## 2024-09-20 DIAGNOSIS — S91.109D OPEN WOUND OF TOE, SUBSEQUENT ENCOUNTER: ICD-10-CM

## 2024-09-20 DIAGNOSIS — M79.671 RIGHT FOOT PAIN: ICD-10-CM

## 2024-09-20 DIAGNOSIS — I70.212 ATHEROSCLEROSIS OF NATIVE ARTERIES OF EXTREMITIES WITH INTERMITTENT CLAUDICATION, LEFT LEG (H): ICD-10-CM

## 2024-09-20 DIAGNOSIS — M79.662 PAIN OF LEFT LOWER LEG: ICD-10-CM

## 2024-09-20 DIAGNOSIS — F51.02 ADJUSTMENT INSOMNIA: ICD-10-CM

## 2024-09-20 DIAGNOSIS — K59.03 DRUG-INDUCED CONSTIPATION: ICD-10-CM

## 2024-09-20 DIAGNOSIS — E51.9 THIAMINE DEFICIENCY: ICD-10-CM

## 2024-09-20 DIAGNOSIS — Z89.511 STATUS POST BELOW-KNEE AMPUTATION OF RIGHT LOWER EXTREMITY (H): ICD-10-CM

## 2024-09-20 DIAGNOSIS — L97.529 ULCER OF FOOT, CHRONIC, LEFT, WITH UNSPECIFIED SEVERITY (H): ICD-10-CM

## 2024-09-20 LAB
ACT BLD: 174 SECONDS (ref 74–150)
ACT BLD: 216 SECONDS (ref 74–150)
ACT BLD: 446 SECONDS (ref 74–150)
ACT BLD: 94 SECONDS (ref 74–150)
ALBUMIN SERPL BCG-MCNC: 2.9 G/DL (ref 3.5–5.2)
ALP SERPL-CCNC: 117 U/L (ref 40–150)
ALT SERPL W P-5'-P-CCNC: 38 U/L (ref 0–70)
ANION GAP SERPL CALCULATED.3IONS-SCNC: 10 MMOL/L (ref 7–15)
ANION GAP SERPL CALCULATED.3IONS-SCNC: 12 MMOL/L (ref 7–15)
APTT PPP: 29 SECONDS (ref 22–38)
AST SERPL W P-5'-P-CCNC: 61 U/L (ref 0–45)
BASOPHILS # BLD MANUAL: 0.1 10E3/UL (ref 0–0.2)
BASOPHILS NFR BLD MANUAL: 1 %
BILIRUB DIRECT SERPL-MCNC: <0.2 MG/DL (ref 0–0.3)
BILIRUB SERPL-MCNC: 0.4 MG/DL
BUN SERPL-MCNC: 26.6 MG/DL (ref 8–23)
BUN SERPL-MCNC: 28.5 MG/DL (ref 8–23)
CALCIUM SERPL-MCNC: 7.5 MG/DL (ref 8.8–10.4)
CALCIUM SERPL-MCNC: 8.3 MG/DL (ref 8.8–10.4)
CHLORIDE SERPL-SCNC: 102 MMOL/L (ref 98–107)
CHLORIDE SERPL-SCNC: 107 MMOL/L (ref 98–107)
CREAT SERPL-MCNC: 0.83 MG/DL (ref 0.67–1.17)
CREAT SERPL-MCNC: 0.89 MG/DL (ref 0.67–1.17)
EGFRCR SERPLBLD CKD-EPI 2021: >90 ML/MIN/1.73M2
EGFRCR SERPLBLD CKD-EPI 2021: >90 ML/MIN/1.73M2
EOSINOPHIL # BLD MANUAL: 0.3 10E3/UL (ref 0–0.7)
EOSINOPHIL NFR BLD MANUAL: 4 %
ERYTHROCYTE [DISTWIDTH] IN BLOOD BY AUTOMATED COUNT: 16.5 % (ref 10–15)
ERYTHROCYTE [DISTWIDTH] IN BLOOD BY AUTOMATED COUNT: 16.8 % (ref 10–15)
GLUCOSE BLDC GLUCOMTR-MCNC: 74 MG/DL (ref 70–99)
GLUCOSE SERPL-MCNC: 113 MG/DL (ref 70–99)
GLUCOSE SERPL-MCNC: 80 MG/DL (ref 70–99)
HCO3 SERPL-SCNC: 25 MMOL/L (ref 22–29)
HCO3 SERPL-SCNC: 25 MMOL/L (ref 22–29)
HCT VFR BLD AUTO: 30.5 % (ref 40–53)
HCT VFR BLD AUTO: 38 % (ref 40–53)
HGB BLD-MCNC: 11.9 G/DL (ref 13.3–17.7)
HGB BLD-MCNC: 9.4 G/DL (ref 13.3–17.7)
LYMPHOCYTES # BLD MANUAL: 2.3 10E3/UL (ref 0.8–5.3)
LYMPHOCYTES NFR BLD MANUAL: 29 %
MCH RBC QN AUTO: 31.5 PG (ref 26.5–33)
MCH RBC QN AUTO: 31.6 PG (ref 26.5–33)
MCHC RBC AUTO-ENTMCNC: 30.8 G/DL (ref 31.5–36.5)
MCHC RBC AUTO-ENTMCNC: 31.3 G/DL (ref 31.5–36.5)
MCV RBC AUTO: 101 FL (ref 78–100)
MCV RBC AUTO: 102 FL (ref 78–100)
MONOCYTES # BLD MANUAL: 0.9 10E3/UL (ref 0–1.3)
MONOCYTES NFR BLD MANUAL: 11 %
NEUTROPHILS # BLD MANUAL: 4.5 10E3/UL (ref 1.6–8.3)
NEUTROPHILS NFR BLD MANUAL: 55 %
NRBC # BLD AUTO: 0 10E3/UL
NRBC BLD AUTO-RTO: 0 /100
PLAT MORPH BLD: ABNORMAL
PLATELET # BLD AUTO: 213 10E3/UL (ref 150–450)
PLATELET # BLD AUTO: 252 10E3/UL (ref 150–450)
POTASSIUM SERPL-SCNC: 4 MMOL/L (ref 3.4–5.3)
POTASSIUM SERPL-SCNC: 4 MMOL/L (ref 3.4–5.3)
PROT SERPL-MCNC: 4.8 G/DL (ref 6.4–8.3)
RBC # BLD AUTO: 2.98 10E6/UL (ref 4.4–5.9)
RBC # BLD AUTO: 3.76 10E6/UL (ref 4.4–5.9)
RBC MORPH BLD: ABNORMAL
SODIUM SERPL-SCNC: 139 MMOL/L (ref 135–145)
SODIUM SERPL-SCNC: 142 MMOL/L (ref 135–145)
VARIANT LYMPHS BLD QL SMEAR: PRESENT
WBC # BLD AUTO: 7.6 10E3/UL (ref 4–11)
WBC # BLD AUTO: 8.1 10E3/UL (ref 4–11)

## 2024-09-20 PROCEDURE — 96365 THER/PROPH/DIAG IV INF INIT: CPT | Mod: 59

## 2024-09-20 PROCEDURE — 76937 US GUIDE VASCULAR ACCESS: CPT | Mod: 26 | Performed by: SURGERY

## 2024-09-20 PROCEDURE — C1769 GUIDE WIRE: HCPCS

## 2024-09-20 PROCEDURE — 96366 THER/PROPH/DIAG IV INF ADDON: CPT

## 2024-09-20 PROCEDURE — 36415 COLL VENOUS BLD VENIPUNCTURE: CPT | Performed by: FAMILY MEDICINE

## 2024-09-20 PROCEDURE — 80048 BASIC METABOLIC PNL TOTAL CA: CPT | Performed by: FAMILY MEDICINE

## 2024-09-20 PROCEDURE — 37228 PR REVASC TIB/PERON ART, ANGIOPLASTY INIT VESSEL: CPT | Mod: 79 | Performed by: SURGERY

## 2024-09-20 PROCEDURE — 250N000011 HC RX IP 250 OP 636: Performed by: SURGERY

## 2024-09-20 PROCEDURE — C1887 CATHETER, GUIDING: HCPCS

## 2024-09-20 PROCEDURE — C1725 CATH, TRANSLUMIN NON-LASER: HCPCS

## 2024-09-20 PROCEDURE — 75710 ARTERY X-RAYS ARM/LEG: CPT | Mod: 26 | Performed by: SURGERY

## 2024-09-20 PROCEDURE — 272N000566 HC SHEATH CR3

## 2024-09-20 PROCEDURE — 272N000302 HC DEVICE INFLATION CR5

## 2024-09-20 PROCEDURE — 99285 EMERGENCY DEPT VISIT HI MDM: CPT | Mod: 25

## 2024-09-20 PROCEDURE — 250N000011 HC RX IP 250 OP 636: Performed by: FAMILY MEDICINE

## 2024-09-20 PROCEDURE — 272N000570 HC SHEATH CR7

## 2024-09-20 PROCEDURE — 82962 GLUCOSE BLOOD TEST: CPT

## 2024-09-20 PROCEDURE — 85730 THROMBOPLASTIN TIME PARTIAL: CPT | Performed by: FAMILY MEDICINE

## 2024-09-20 PROCEDURE — 272N000500 HC NEEDLE CR2

## 2024-09-20 PROCEDURE — 80053 COMPREHEN METABOLIC PANEL: CPT | Performed by: SURGERY

## 2024-09-20 PROCEDURE — 99152 MOD SED SAME PHYS/QHP 5/>YRS: CPT

## 2024-09-20 PROCEDURE — G1010 CDSM STANSON: HCPCS

## 2024-09-20 PROCEDURE — 76937 US GUIDE VASCULAR ACCESS: CPT

## 2024-09-20 PROCEDURE — 96375 TX/PRO/DX INJ NEW DRUG ADDON: CPT

## 2024-09-20 PROCEDURE — 80048 BASIC METABOLIC PNL TOTAL CA: CPT | Performed by: SURGERY

## 2024-09-20 PROCEDURE — 85007 BL SMEAR W/DIFF WBC COUNT: CPT | Performed by: FAMILY MEDICINE

## 2024-09-20 PROCEDURE — 85027 COMPLETE CBC AUTOMATED: CPT | Performed by: SURGERY

## 2024-09-20 PROCEDURE — 258N000003 HC RX IP 258 OP 636: Performed by: SURGERY

## 2024-09-20 PROCEDURE — 85014 HEMATOCRIT: CPT | Performed by: FAMILY MEDICINE

## 2024-09-20 PROCEDURE — 85347 COAGULATION TIME ACTIVATED: CPT

## 2024-09-20 PROCEDURE — 272N000569 HC SHEATH CR6

## 2024-09-20 PROCEDURE — 36415 COLL VENOUS BLD VENIPUNCTURE: CPT | Performed by: SURGERY

## 2024-09-20 PROCEDURE — 37228 HC REVASC TIB-PERON ART ANGIOPLASTY INIT VESSEL: CPT

## 2024-09-20 PROCEDURE — 255N000002 HC RX 255 OP 636: Performed by: SURGERY

## 2024-09-20 PROCEDURE — 96376 TX/PRO/DX INJ SAME DRUG ADON: CPT

## 2024-09-20 PROCEDURE — 99152 MOD SED SAME PHYS/QHP 5/>YRS: CPT | Mod: GC | Performed by: SURGERY

## 2024-09-20 PROCEDURE — 82248 BILIRUBIN DIRECT: CPT | Performed by: FAMILY MEDICINE

## 2024-09-20 RX ORDER — ARGATROBAN 1 MG/ML
40 INJECTION, SOLUTION INTRAVENOUS ONCE
Status: COMPLETED | OUTPATIENT
Start: 2024-09-20 | End: 2024-09-20

## 2024-09-20 RX ORDER — SODIUM CHLORIDE 9 MG/ML
INJECTION, SOLUTION INTRAVENOUS CONTINUOUS
Status: CANCELLED | OUTPATIENT
Start: 2024-09-20

## 2024-09-20 RX ORDER — IODIXANOL 320 MG/ML
100 INJECTION, SOLUTION INTRAVASCULAR ONCE
Status: COMPLETED | OUTPATIENT
Start: 2024-09-20 | End: 2024-09-20

## 2024-09-20 RX ORDER — NICOTINE POLACRILEX 4 MG
15-30 LOZENGE BUCCAL
Status: DISCONTINUED | OUTPATIENT
Start: 2024-09-20 | End: 2024-09-21 | Stop reason: HOSPADM

## 2024-09-20 RX ORDER — IOPAMIDOL 755 MG/ML
90 INJECTION, SOLUTION INTRAVASCULAR ONCE
Status: COMPLETED | OUTPATIENT
Start: 2024-09-20 | End: 2024-09-20

## 2024-09-20 RX ORDER — ARGATROBAN 1 MG/ML
245 INJECTION, SOLUTION INTRAVENOUS ONCE
Status: COMPLETED | OUTPATIENT
Start: 2024-09-20 | End: 2024-09-20

## 2024-09-20 RX ORDER — LIDOCAINE 40 MG/G
CREAM TOPICAL
Status: DISCONTINUED | OUTPATIENT
Start: 2024-09-20 | End: 2024-09-21 | Stop reason: HOSPADM

## 2024-09-20 RX ORDER — HYDROMORPHONE HCL IN WATER/PF 6 MG/30 ML
0.5 PATIENT CONTROLLED ANALGESIA SYRINGE INTRAVENOUS ONCE
Status: COMPLETED | OUTPATIENT
Start: 2024-09-20 | End: 2024-09-20

## 2024-09-20 RX ORDER — ACETAMINOPHEN 325 MG/1
650 TABLET ORAL EVERY 6 HOURS PRN
Status: CANCELLED | OUTPATIENT
Start: 2024-09-20

## 2024-09-20 RX ORDER — HYDROMORPHONE HCL IN WATER/PF 6 MG/30 ML
0.5 PATIENT CONTROLLED ANALGESIA SYRINGE INTRAVENOUS
Status: DISCONTINUED | OUTPATIENT
Start: 2024-09-20 | End: 2024-09-23

## 2024-09-20 RX ORDER — CEFAZOLIN SODIUM/WATER 2 G/20 ML
2 SYRINGE (ML) INTRAVENOUS ONCE
Status: COMPLETED | OUTPATIENT
Start: 2024-09-20 | End: 2024-09-20

## 2024-09-20 RX ORDER — INSULIN ASPART 100 [IU]/ML
5 INJECTION, SOLUTION INTRAVENOUS; SUBCUTANEOUS
COMMUNITY

## 2024-09-20 RX ORDER — DEXTROSE MONOHYDRATE 25 G/50ML
25-50 INJECTION, SOLUTION INTRAVENOUS
Status: DISCONTINUED | OUTPATIENT
Start: 2024-09-20 | End: 2024-09-21 | Stop reason: HOSPADM

## 2024-09-20 RX ORDER — ARGATROBAN 1 MG/ML
350 INJECTION, SOLUTION INTRAVENOUS ONCE
Status: DISCONTINUED | OUTPATIENT
Start: 2024-09-20 | End: 2024-09-20 | Stop reason: DRUGHIGH

## 2024-09-20 RX ORDER — HYDROMORPHONE HYDROCHLORIDE 1 MG/ML
0.2 INJECTION, SOLUTION INTRAMUSCULAR; INTRAVENOUS; SUBCUTANEOUS ONCE
Status: COMPLETED | OUTPATIENT
Start: 2024-09-20 | End: 2024-09-20

## 2024-09-20 RX ADMIN — ARGATROBAN 1 MCG/KG/MIN: 50 INJECTION INTRAVENOUS at 09:12

## 2024-09-20 RX ADMIN — SODIUM CHLORIDE 50000 MCG: 9 INJECTION, SOLUTION INTRAVENOUS at 08:41

## 2024-09-20 RX ADMIN — MIDAZOLAM HYDROCHLORIDE 0.5 MG: 1 INJECTION, SOLUTION INTRAMUSCULAR; INTRAVENOUS at 09:56

## 2024-09-20 RX ADMIN — HYDROMORPHONE HYDROCHLORIDE 0.5 MG: 0.2 INJECTION, SOLUTION INTRAMUSCULAR; INTRAVENOUS; SUBCUTANEOUS at 20:34

## 2024-09-20 RX ADMIN — MIDAZOLAM HYDROCHLORIDE 1 MG: 1 INJECTION, SOLUTION INTRAMUSCULAR; INTRAVENOUS at 08:32

## 2024-09-20 RX ADMIN — MIDAZOLAM HYDROCHLORIDE 0.5 MG: 1 INJECTION, SOLUTION INTRAMUSCULAR; INTRAVENOUS at 09:31

## 2024-09-20 RX ADMIN — FENTANYL CITRATE 25 MCG: 50 INJECTION, SOLUTION INTRAMUSCULAR; INTRAVENOUS at 09:27

## 2024-09-20 RX ADMIN — IOPAMIDOL 90 ML: 755 INJECTION, SOLUTION INTRAVENOUS at 21:51

## 2024-09-20 RX ADMIN — ARGATROBAN 2450 MCG: 1 INJECTION, SOLUTION INTRAVENOUS at 09:28

## 2024-09-20 RX ADMIN — FENTANYL CITRATE 25 MCG: 50 INJECTION, SOLUTION INTRAMUSCULAR; INTRAVENOUS at 09:57

## 2024-09-20 RX ADMIN — HYDROMORPHONE HYDROCHLORIDE 0.5 MG: 0.2 INJECTION, SOLUTION INTRAMUSCULAR; INTRAVENOUS; SUBCUTANEOUS at 21:23

## 2024-09-20 RX ADMIN — Medication 2 G: at 07:45

## 2024-09-20 RX ADMIN — FENTANYL CITRATE 50 MCG: 50 INJECTION, SOLUTION INTRAMUSCULAR; INTRAVENOUS at 09:05

## 2024-09-20 RX ADMIN — ARGATROBAN 14990 MCG: 1 INJECTION, SOLUTION INTRAVENOUS at 09:44

## 2024-09-20 RX ADMIN — FENTANYL CITRATE 25 MCG: 50 INJECTION, SOLUTION INTRAMUSCULAR; INTRAVENOUS at 08:41

## 2024-09-20 RX ADMIN — MIDAZOLAM HYDROCHLORIDE 0.5 MG: 1 INJECTION, SOLUTION INTRAMUSCULAR; INTRAVENOUS at 09:40

## 2024-09-20 RX ADMIN — IODIXANOL 30 ML: 320 INJECTION, SOLUTION INTRAVASCULAR at 10:10

## 2024-09-20 RX ADMIN — ONDANSETRON 4 MG: 2 INJECTION INTRAMUSCULAR; INTRAVENOUS at 08:10

## 2024-09-20 RX ADMIN — FENTANYL CITRATE 25 MCG: 50 INJECTION, SOLUTION INTRAMUSCULAR; INTRAVENOUS at 09:48

## 2024-09-20 RX ADMIN — HYDROMORPHONE HYDROCHLORIDE 0.5 MG: 0.2 INJECTION, SOLUTION INTRAMUSCULAR; INTRAVENOUS; SUBCUTANEOUS at 23:23

## 2024-09-20 RX ADMIN — HYDROMORPHONE HYDROCHLORIDE 0.2 MG: 1 INJECTION, SOLUTION INTRAMUSCULAR; INTRAVENOUS; SUBCUTANEOUS at 07:50

## 2024-09-20 RX ADMIN — ARGATROBAN 1 MCG/KG/MIN: 50 INJECTION, SOLUTION INTRAVENOUS at 21:55

## 2024-09-20 ASSESSMENT — ACTIVITIES OF DAILY LIVING (ADL)
ADLS_ACUITY_SCORE: 42

## 2024-09-20 ASSESSMENT — ENCOUNTER SYMPTOMS: VOMITING: 1

## 2024-09-20 NOTE — DISCHARGE INSTRUCTIONS
Angiogram Discharge Instructions:    You had an angiogram procedure. An angiogram is a procedure thatuses x-rays to take pictures of your blood vessels. A long, flexible tube or catheter is inserted through the blood stream (through the procedure site) to help deliver contrast (dye) into the arteries so they can be visibleon the x-ray. Angiograms are used to evaluate and treat possible blockages or other disease in the arterial system. Please follow the below instructions after your angiogram, including monitoring of your procedure site.    Care instructions after angiogram procedure:    - If you received sedation for your procedure, do not drive or operate heavy machinery for the rest of the day.    - Do not lift objects greater than 10 poundsfor 2 days following angiogram procedure.    - Avoid excessive exercise and straining for 2 days.    - Avoid tub baths, pools, hot tubs and Jacuzzis for 3 days or until procedure site is well healed.    - Youmay shower beginning tomorrow. Do not scrub procedure site until well healed; pat dry.    - Return to your normal activities as you tolerate after the 2 day restriction.    - You can expect to return to work 1-2days after your procedure - depending on the nature of your profession.    - It is normal to have some tenderness and minimal swelling at procedure puncture site. A small area of discoloration may be present.Tenderness typically subsides in 1-2 days. A small knot may also be present at puncture site for 6-8 weeks. This can be a normal part of the healing process.    Medications and other post-procedure care:    -If you had a blockage opened please make sure to fill your prescription for any new medication, such as Plavix, that you may have been prescribed and take it everyday    - If you have kidney function issues, please makesure to hydrate yourself well for the next two days by drinking lots of fluids to help clear your body of the dye used. If you have heart  problems such as heart failure, this may have to be moderated.    - If you are onMetformin, please do not resume it for 48hrs.    Follow up:    - Follow up with your vascular surgery team at the [facility] vascular center A follow up appointment should already be arranged for you.If you are unsure of your appointment or do not have a follow up appointment in the next 2-3 weeks, please call our office at [phone]. You will need to have an ultrasound 2-3 weeks after your angiogram and should bescheduled at the time of your follow up appt.    Further follow up will be based on ultrasound results. Typical follow up is every 3 months for the first year, then every 6 months to one year thereafter.    seek medical evaluation for:    - If you develop fevers (greater than 101 F (38.3C)).    - If you develop increasing pain, redness, purulent drainage, tenderness, or swelling at procedure site.    If you experience any bleeding from procedure/puncture site: lie down, firmly apply pressure to puncture site and call 911.    - Seek emergent evaluation if you experience any new leg/arm pain, discoloration ornumbness.    Call the vascular center at [phone] with questions/concerns or if you have any of the above symptoms.

## 2024-09-20 NOTE — RESULT ENCOUNTER NOTE
Tacrolimus level 10.7 at 11.5 hours on 9/19 (confirmed with TCU)  Goal 8-10  Level likely close to goal at 12 hour willian. No change in dose, TCU will recheck level in 2 weeks

## 2024-09-20 NOTE — PRE-PROCEDURE
GENERAL PRE-PROCEDURE:   Procedure:  Left lower extremity angiogram with possible intervention  Date/Time:  9/20/2024 7:47 AM    Verbal consent obtained?: Yes    Written consent obtained?: Yes    Risks and benefits: Risks, benefits and alternatives were discussed    Consent given by:  Patient  Patient states understanding of procedure being performed: Yes    Patient's understanding of procedure matches consent: Yes    Procedure consent matches procedure scheduled: Yes    Expected level of sedation:  Moderate  Appropriately NPO:  Yes  ASA Class:  3  Mallampati  :  Grade 3- soft palate visible, posterior pharyngeal wall not visible  Lungs:  Lungs clear with good breath sounds bilaterally  Heart:  Normal heart sounds and rate  History & Physical reviewed:  History and physical reviewed and no updates needed  Statement of review:  I have reviewed the lab findings, diagnostic data, medications, and the plan for sedation

## 2024-09-20 NOTE — PROVIDER NOTIFICATION
Called and gave report to Cyn Cottrell at [facility], [phone]. No questions at the end of report.

## 2024-09-20 NOTE — IR NOTE
Interventional Radiology Intra-procedural Nursing Note  Patient Name: Aubrey Duncan  Medical Record Number: [id number]  Today's Date: September 20, 2024    Procedure: left lower extremity angiogram  Start time: 0834  End time: 0950  Sedation time: 80 minutes    Administered medication totals:  Versed 2.5 mg IVP  Fentanyl 150 mcg IVP    Last dose of sedation administered at .   Procedure well tolerated by patient without complications. Procedure end with debrief by Dr. Sneed.        Note: Patient entered Interventional Radiology Suite number 1 via cart. Patient awake, alert and orientated. Assisted onto procedural table in supine position. Prepped and draped.  Dr. Sneed in room. Time out and procedure started. Vital signs stable. Telemetry reading NSR.

## 2024-09-20 NOTE — PROGRESS NOTES
Pt up to chair x1 hour. Puncture site remained clean, dry and without hematoma.  Slight bruising to skin (2cm by 3cm) directly over site though site remains soft.  Pt up to bathroom x1. Denies pain.  Pt and wife verbalize understanding of discharge instructions.  Pt brought to front entrance via wheelchair, accompanied by his wife as his  to return to TCU.

## 2024-09-20 NOTE — ED NOTES
Expected Patient Referral to ED  6:48 PM    Referring Clinic/Provider:  [facility]    Reason for referral/Clinical facts:  Angiogram this morning, increased leg pain. argatroban drip and pain meds, CTA with runoff    Recommendations provided:  Send to ED for further evaluation    Caller was informed that this institution does possess the capabilities and/or resources to provide for patient and should be transferred to our facility.    Discussed that if direct admit is sought and any hurdles are encountered, this ED would be happy to see the patient and evaluate.    Informed caller that recommendations provided are recommendations based only on the facts provided and that they responsible to accept or reject the advice, or to seek a formal in person consultation as needed and that this ED will see/treat patient should they arrive.      Sandoval Tijerina MD  [facility] EMERGENCY DEPARTMENT  [address] [place] [id number]  [phone]     Sandoval Tijerina MD  09/20/24 [id number]       Sandoval Tijerina MD  09/20/24 7231

## 2024-09-21 ENCOUNTER — APPOINTMENT (OUTPATIENT)
Dept: INTERVENTIONAL RADIOLOGY/VASCULAR | Facility: HOSPITAL | Age: 69
DRG: 239 | End: 2024-09-21
Attending: RADIOLOGY
Payer: MEDICARE

## 2024-09-21 PROBLEM — L97.529: Status: ACTIVE | Noted: 2024-09-21

## 2024-09-21 PROBLEM — I70.209 ARTERIAL OCCLUSION, LOWER EXTREMITY (H): Status: ACTIVE | Noted: 2024-09-21

## 2024-09-21 PROBLEM — M79.662 PAIN OF LEFT LOWER LEG: Status: ACTIVE | Noted: 2024-09-21

## 2024-09-21 LAB
APTT PPP: 101 SECONDS (ref 22–38)
APTT PPP: 43 SECONDS (ref 22–38)
APTT PPP: 55 SECONDS (ref 22–38)
APTT PPP: 94 SECONDS (ref 22–38)
ERYTHROCYTE [DISTWIDTH] IN BLOOD BY AUTOMATED COUNT: 16.8 % (ref 10–15)
FIBRINOGEN PPP-MCNC: 294 MG/DL (ref 170–510)
GLUCOSE BLDC GLUCOMTR-MCNC: 103 MG/DL (ref 70–99)
GLUCOSE BLDC GLUCOMTR-MCNC: 117 MG/DL (ref 70–99)
GLUCOSE BLDC GLUCOMTR-MCNC: 125 MG/DL (ref 70–99)
GLUCOSE BLDC GLUCOMTR-MCNC: 129 MG/DL (ref 70–99)
GLUCOSE BLDC GLUCOMTR-MCNC: 144 MG/DL (ref 70–99)
GLUCOSE BLDC GLUCOMTR-MCNC: 93 MG/DL (ref 70–99)
HCT VFR BLD AUTO: 27.2 % (ref 40–53)
HGB BLD-MCNC: 8.5 G/DL (ref 13.3–17.7)
INR PPP: 1.45 (ref 0.85–1.15)
MCH RBC QN AUTO: 31.7 PG (ref 26.5–33)
MCHC RBC AUTO-ENTMCNC: 31.3 G/DL (ref 31.5–36.5)
MCV RBC AUTO: 102 FL (ref 78–100)
PLATELET # BLD AUTO: 156 10E3/UL (ref 150–450)
RADIOLOGIST FLAGS: ABNORMAL
RBC # BLD AUTO: 2.68 10E6/UL (ref 4.4–5.9)
WBC # BLD AUTO: 10.1 10E3/UL (ref 4–11)

## 2024-09-21 PROCEDURE — 250N000013 HC RX MED GY IP 250 OP 250 PS 637: Performed by: INTERNAL MEDICINE

## 2024-09-21 PROCEDURE — 85610 PROTHROMBIN TIME: CPT

## 2024-09-21 PROCEDURE — 37211 THROMBOLYTIC ART THERAPY: CPT

## 2024-09-21 PROCEDURE — 272N000302 HC DEVICE INFLATION CR5

## 2024-09-21 PROCEDURE — 047L341 DILATION OF LEFT FEMORAL ARTERY WITH DRUG-ELUTING INTRALUMINAL DEVICE, USING DRUG-COATED BALLOON, PERCUTANEOUS APPROACH: ICD-10-PCS | Performed by: RADIOLOGY

## 2024-09-21 PROCEDURE — 250N000011 HC RX IP 250 OP 636: Performed by: FAMILY MEDICINE

## 2024-09-21 PROCEDURE — 85730 THROMBOPLASTIN TIME PARTIAL: CPT | Performed by: EMERGENCY MEDICINE

## 2024-09-21 PROCEDURE — 85384 FIBRINOGEN ACTIVITY: CPT

## 2024-09-21 PROCEDURE — 36415 COLL VENOUS BLD VENIPUNCTURE: CPT | Performed by: EMERGENCY MEDICINE

## 2024-09-21 PROCEDURE — 99223 1ST HOSP IP/OBS HIGH 75: CPT | Mod: AI | Performed by: INTERNAL MEDICINE

## 2024-09-21 PROCEDURE — 85730 THROMBOPLASTIN TIME PARTIAL: CPT | Performed by: FAMILY MEDICINE

## 2024-09-21 PROCEDURE — 272N000117 HC CATH CR2

## 2024-09-21 PROCEDURE — 85730 THROMBOPLASTIN TIME PARTIAL: CPT | Performed by: INTERNAL MEDICINE

## 2024-09-21 PROCEDURE — C1887 CATHETER, GUIDING: HCPCS

## 2024-09-21 PROCEDURE — 99207 PR APP CREDIT; MD BILLING SHARED VISIT: CPT | Performed by: INTERNAL MEDICINE

## 2024-09-21 PROCEDURE — C1769 GUIDE WIRE: HCPCS

## 2024-09-21 PROCEDURE — 200N000001 HC R&B ICU

## 2024-09-21 PROCEDURE — 272N000570 HC SHEATH CR7

## 2024-09-21 PROCEDURE — 36415 COLL VENOUS BLD VENIPUNCTURE: CPT | Performed by: INTERNAL MEDICINE

## 2024-09-21 PROCEDURE — 272N000566 HC SHEATH CR3

## 2024-09-21 PROCEDURE — 85730 THROMBOPLASTIN TIME PARTIAL: CPT

## 2024-09-21 PROCEDURE — 36415 COLL VENOUS BLD VENIPUNCTURE: CPT | Performed by: FAMILY MEDICINE

## 2024-09-21 PROCEDURE — 255N000002 HC RX 255 OP 636: Performed by: RADIOLOGY

## 2024-09-21 PROCEDURE — C1725 CATH, TRANSLUMIN NON-LASER: HCPCS

## 2024-09-21 PROCEDURE — 99418 PROLNG IP/OBS E/M EA 15 MIN: CPT | Mod: AI | Performed by: INTERNAL MEDICINE

## 2024-09-21 PROCEDURE — 250N000011 HC RX IP 250 OP 636: Performed by: INTERNAL MEDICINE

## 2024-09-21 PROCEDURE — 272N000500 HC NEEDLE CR2

## 2024-09-21 PROCEDURE — 04HL33Z INSERTION OF INFUSION DEVICE INTO LEFT FEMORAL ARTERY, PERCUTANEOUS APPROACH: ICD-10-PCS | Performed by: RADIOLOGY

## 2024-09-21 PROCEDURE — 82962 GLUCOSE BLOOD TEST: CPT

## 2024-09-21 PROCEDURE — 047N3D1 DILATION OF LEFT POPLITEAL ARTERY WITH INTRALUMINAL DEVICE, USING DRUG-COATED BALLOON, PERCUTANEOUS APPROACH: ICD-10-PCS | Performed by: RADIOLOGY

## 2024-09-21 PROCEDURE — 250N000013 HC RX MED GY IP 250 OP 250 PS 637

## 2024-09-21 PROCEDURE — 99152 MOD SED SAME PHYS/QHP 5/>YRS: CPT

## 2024-09-21 PROCEDURE — 250N000012 HC RX MED GY IP 250 OP 636 PS 637: Performed by: INTERNAL MEDICINE

## 2024-09-21 PROCEDURE — C1876 STENT, NON-COA/NON-COV W/DEL: HCPCS

## 2024-09-21 PROCEDURE — 02HV33Z INSERTION OF INFUSION DEVICE INTO SUPERIOR VENA CAVA, PERCUTANEOUS APPROACH: ICD-10-PCS | Performed by: RADIOLOGY

## 2024-09-21 PROCEDURE — 76937 US GUIDE VASCULAR ACCESS: CPT

## 2024-09-21 PROCEDURE — 3E05317 INTRODUCTION OF OTHER THROMBOLYTIC INTO PERIPHERAL ARTERY, PERCUTANEOUS APPROACH: ICD-10-PCS | Performed by: RADIOLOGY

## 2024-09-21 PROCEDURE — 258N000003 HC RX IP 258 OP 636: Performed by: RADIOLOGY

## 2024-09-21 PROCEDURE — 250N000011 HC RX IP 250 OP 636: Performed by: RADIOLOGY

## 2024-09-21 PROCEDURE — 258N000003 HC RX IP 258 OP 636: Performed by: INTERNAL MEDICINE

## 2024-09-21 PROCEDURE — 85014 HEMATOCRIT: CPT

## 2024-09-21 RX ORDER — HYDROMORPHONE HCL IN WATER/PF 6 MG/30 ML
0.5 PATIENT CONTROLLED ANALGESIA SYRINGE INTRAVENOUS
Status: DISCONTINUED | OUTPATIENT
Start: 2024-09-21 | End: 2024-09-21

## 2024-09-21 RX ORDER — NALOXONE HYDROCHLORIDE 0.4 MG/ML
0.4 INJECTION, SOLUTION INTRAMUSCULAR; INTRAVENOUS; SUBCUTANEOUS
Status: DISCONTINUED | OUTPATIENT
Start: 2024-09-21 | End: 2024-09-21 | Stop reason: HOSPADM

## 2024-09-21 RX ORDER — PREDNISONE 5 MG/1
5 TABLET ORAL EVERY MORNING
Status: DISCONTINUED | OUTPATIENT
Start: 2024-09-21 | End: 2024-10-01 | Stop reason: HOSPADM

## 2024-09-21 RX ORDER — DAPSONE 25 MG/1
50 TABLET ORAL DAILY
Status: DISCONTINUED | OUTPATIENT
Start: 2024-09-21 | End: 2024-10-01 | Stop reason: HOSPADM

## 2024-09-21 RX ORDER — AZITHROMYCIN 250 MG/1
250 TABLET, FILM COATED ORAL
Status: DISCONTINUED | OUTPATIENT
Start: 2024-09-23 | End: 2024-10-01 | Stop reason: HOSPADM

## 2024-09-21 RX ORDER — LOPERAMIDE HCL 2 MG
2 CAPSULE ORAL 4 TIMES DAILY PRN
Status: DISCONTINUED | OUTPATIENT
Start: 2024-09-21 | End: 2024-10-01 | Stop reason: HOSPADM

## 2024-09-21 RX ORDER — PANTOPRAZOLE SODIUM 40 MG/1
40 TABLET, DELAYED RELEASE ORAL DAILY
Status: DISCONTINUED | OUTPATIENT
Start: 2024-09-21 | End: 2024-10-01 | Stop reason: HOSPADM

## 2024-09-21 RX ORDER — NALOXONE HYDROCHLORIDE 0.4 MG/ML
0.2 INJECTION, SOLUTION INTRAMUSCULAR; INTRAVENOUS; SUBCUTANEOUS
Status: DISCONTINUED | OUTPATIENT
Start: 2024-09-21 | End: 2024-09-21 | Stop reason: HOSPADM

## 2024-09-21 RX ORDER — ASPIRIN 81 MG/1
81 TABLET ORAL DAILY
Status: DISCONTINUED | OUTPATIENT
Start: 2024-09-21 | End: 2024-09-25

## 2024-09-21 RX ORDER — CLOPIDOGREL BISULFATE 75 MG/1
75 TABLET ORAL DAILY
Status: DISCONTINUED | OUTPATIENT
Start: 2024-09-21 | End: 2024-10-01 | Stop reason: HOSPADM

## 2024-09-21 RX ORDER — DOCUSATE SODIUM 100 MG/1
100 CAPSULE, LIQUID FILLED ORAL 2 TIMES DAILY
Status: DISCONTINUED | OUTPATIENT
Start: 2024-09-21 | End: 2024-09-22

## 2024-09-21 RX ORDER — LIDOCAINE 40 MG/G
CREAM TOPICAL
Status: DISCONTINUED | OUTPATIENT
Start: 2024-09-21 | End: 2024-09-25

## 2024-09-21 RX ORDER — VENLAFAXINE HYDROCHLORIDE 37.5 MG/1
37.5 CAPSULE, EXTENDED RELEASE ORAL DAILY
Status: DISCONTINUED | OUTPATIENT
Start: 2024-09-21 | End: 2024-10-01 | Stop reason: HOSPADM

## 2024-09-21 RX ORDER — ONDANSETRON 2 MG/ML
4 INJECTION INTRAMUSCULAR; INTRAVENOUS EVERY 6 HOURS PRN
Status: DISCONTINUED | OUTPATIENT
Start: 2024-09-21 | End: 2024-09-25

## 2024-09-21 RX ORDER — NALOXONE HYDROCHLORIDE 0.4 MG/ML
0.4 INJECTION, SOLUTION INTRAMUSCULAR; INTRAVENOUS; SUBCUTANEOUS
Status: DISCONTINUED | OUTPATIENT
Start: 2024-09-21 | End: 2024-10-01 | Stop reason: HOSPADM

## 2024-09-21 RX ORDER — IODIXANOL 320 MG/ML
400 INJECTION, SOLUTION INTRAVASCULAR ONCE
Status: COMPLETED | OUTPATIENT
Start: 2024-09-21 | End: 2024-09-21

## 2024-09-21 RX ORDER — TACROLIMUS 1 MG/1
3 CAPSULE ORAL 2 TIMES DAILY
Status: DISCONTINUED | OUTPATIENT
Start: 2024-09-21 | End: 2024-10-01 | Stop reason: HOSPADM

## 2024-09-21 RX ORDER — PREDNISONE 2.5 MG/1
2.5 TABLET ORAL EVERY EVENING
Status: DISCONTINUED | OUTPATIENT
Start: 2024-09-21 | End: 2024-10-01 | Stop reason: HOSPADM

## 2024-09-21 RX ORDER — PROCHLORPERAZINE 25 MG
12.5 SUPPOSITORY, RECTAL RECTAL EVERY 12 HOURS PRN
Status: DISCONTINUED | OUTPATIENT
Start: 2024-09-21 | End: 2024-10-01 | Stop reason: HOSPADM

## 2024-09-21 RX ORDER — NALOXONE HYDROCHLORIDE 0.4 MG/ML
0.2 INJECTION, SOLUTION INTRAMUSCULAR; INTRAVENOUS; SUBCUTANEOUS
Status: DISCONTINUED | OUTPATIENT
Start: 2024-09-21 | End: 2024-10-01 | Stop reason: HOSPADM

## 2024-09-21 RX ORDER — FENTANYL CITRATE 50 UG/ML
25-50 INJECTION, SOLUTION INTRAMUSCULAR; INTRAVENOUS EVERY 5 MIN PRN
Status: DISCONTINUED | OUTPATIENT
Start: 2024-09-21 | End: 2024-09-21 | Stop reason: HOSPADM

## 2024-09-21 RX ORDER — NICOTINE POLACRILEX 4 MG
15-30 LOZENGE BUCCAL
Status: DISCONTINUED | OUTPATIENT
Start: 2024-09-21 | End: 2024-09-25

## 2024-09-21 RX ORDER — CEFAZOLIN SODIUM/WATER 2 G/20 ML
2 SYRINGE (ML) INTRAVENOUS
Status: COMPLETED | OUTPATIENT
Start: 2024-09-21 | End: 2024-09-21

## 2024-09-21 RX ORDER — DEXTROSE MONOHYDRATE 25 G/50ML
25-50 INJECTION, SOLUTION INTRAVENOUS
Status: DISCONTINUED | OUTPATIENT
Start: 2024-09-21 | End: 2024-09-25

## 2024-09-21 RX ORDER — AMOXICILLIN 250 MG
2 CAPSULE ORAL 2 TIMES DAILY PRN
Status: DISCONTINUED | OUTPATIENT
Start: 2024-09-21 | End: 2024-10-01 | Stop reason: HOSPADM

## 2024-09-21 RX ORDER — ACYCLOVIR 400 MG/1
400 TABLET ORAL 2 TIMES DAILY
Status: DISCONTINUED | OUTPATIENT
Start: 2024-09-21 | End: 2024-10-01 | Stop reason: HOSPADM

## 2024-09-21 RX ORDER — PROCHLORPERAZINE MALEATE 5 MG
5 TABLET ORAL EVERY 6 HOURS PRN
Status: DISCONTINUED | OUTPATIENT
Start: 2024-09-21 | End: 2024-10-01 | Stop reason: HOSPADM

## 2024-09-21 RX ORDER — CALCIUM CARBONATE 500 MG/1
1000 TABLET, CHEWABLE ORAL 4 TIMES DAILY PRN
Status: DISCONTINUED | OUTPATIENT
Start: 2024-09-21 | End: 2024-10-01 | Stop reason: HOSPADM

## 2024-09-21 RX ORDER — ALBUTEROL SULFATE 90 UG/1
1-2 AEROSOL, METERED RESPIRATORY (INHALATION) EVERY 6 HOURS PRN
Status: DISCONTINUED | OUTPATIENT
Start: 2024-09-21 | End: 2024-10-01 | Stop reason: HOSPADM

## 2024-09-21 RX ORDER — ROSUVASTATIN CALCIUM 10 MG/1
20 TABLET, COATED ORAL
Status: DISCONTINUED | OUTPATIENT
Start: 2024-09-23 | End: 2024-10-01 | Stop reason: HOSPADM

## 2024-09-21 RX ORDER — FLUTICASONE FUROATE AND VILANTEROL 200; 25 UG/1; UG/1
1 POWDER RESPIRATORY (INHALATION) DAILY
Status: DISCONTINUED | OUTPATIENT
Start: 2024-09-21 | End: 2024-10-01 | Stop reason: HOSPADM

## 2024-09-21 RX ORDER — FLUDROCORTISONE ACETATE 0.1 MG/1
0.1 TABLET ORAL DAILY
Status: DISCONTINUED | OUTPATIENT
Start: 2024-09-21 | End: 2024-10-01 | Stop reason: HOSPADM

## 2024-09-21 RX ORDER — ACETAMINOPHEN 325 MG/1
650 TABLET ORAL EVERY 6 HOURS PRN
Status: DISCONTINUED | OUTPATIENT
Start: 2024-09-21 | End: 2024-09-25

## 2024-09-21 RX ORDER — MONTELUKAST SODIUM 10 MG/1
10 TABLET ORAL EVERY EVENING
Status: DISCONTINUED | OUTPATIENT
Start: 2024-09-21 | End: 2024-10-01 | Stop reason: HOSPADM

## 2024-09-21 RX ORDER — ONDANSETRON 4 MG/1
4 TABLET, ORALLY DISINTEGRATING ORAL EVERY 6 HOURS PRN
Status: DISCONTINUED | OUTPATIENT
Start: 2024-09-21 | End: 2024-09-25

## 2024-09-21 RX ORDER — FLUMAZENIL 0.1 MG/ML
0.2 INJECTION, SOLUTION INTRAVENOUS
Status: DISCONTINUED | OUTPATIENT
Start: 2024-09-21 | End: 2024-09-21 | Stop reason: HOSPADM

## 2024-09-21 RX ORDER — AMOXICILLIN 250 MG
1 CAPSULE ORAL 2 TIMES DAILY PRN
Status: DISCONTINUED | OUTPATIENT
Start: 2024-09-21 | End: 2024-09-27

## 2024-09-21 RX ORDER — SODIUM CHLORIDE 9 MG/ML
INJECTION, SOLUTION INTRAVENOUS CONTINUOUS
Status: ACTIVE | OUTPATIENT
Start: 2024-09-21 | End: 2024-09-22

## 2024-09-21 RX ORDER — CARVEDILOL 3.12 MG/1
6.25 TABLET ORAL 2 TIMES DAILY WITH MEALS
Status: DISCONTINUED | OUTPATIENT
Start: 2024-09-21 | End: 2024-10-01 | Stop reason: HOSPADM

## 2024-09-21 RX ADMIN — MIDAZOLAM HYDROCHLORIDE 1 MG: 1 INJECTION, SOLUTION INTRAMUSCULAR; INTRAVENOUS at 09:08

## 2024-09-21 RX ADMIN — Medication 2 CAPSULE: at 08:11

## 2024-09-21 RX ADMIN — HYDROMORPHONE HYDROCHLORIDE 0.5 MG: 0.2 INJECTION, SOLUTION INTRAMUSCULAR; INTRAVENOUS; SUBCUTANEOUS at 13:20

## 2024-09-21 RX ADMIN — HYDROMORPHONE HYDROCHLORIDE 0.5 MG: 0.2 INJECTION, SOLUTION INTRAMUSCULAR; INTRAVENOUS; SUBCUTANEOUS at 20:37

## 2024-09-21 RX ADMIN — FLUDROCORTISONE ACETATE 0.1 MG: 0.1 TABLET ORAL at 08:11

## 2024-09-21 RX ADMIN — HYDROMORPHONE HYDROCHLORIDE 0.5 MG: 0.2 INJECTION, SOLUTION INTRAMUSCULAR; INTRAVENOUS; SUBCUTANEOUS at 03:22

## 2024-09-21 RX ADMIN — MIDAZOLAM HYDROCHLORIDE 0.5 MG: 1 INJECTION, SOLUTION INTRAMUSCULAR; INTRAVENOUS at 11:17

## 2024-09-21 RX ADMIN — MIDAZOLAM HYDROCHLORIDE 0.5 MG: 1 INJECTION, SOLUTION INTRAMUSCULAR; INTRAVENOUS at 09:15

## 2024-09-21 RX ADMIN — FLUTICASONE FUROATE AND VILANTEROL TRIFENATATE 1 PUFF: 200; 25 POWDER RESPIRATORY (INHALATION) at 08:11

## 2024-09-21 RX ADMIN — ARGATROBAN 0.25 MCG/KG/MIN: 50 INJECTION, SOLUTION INTRAVENOUS at 04:51

## 2024-09-21 RX ADMIN — MIDAZOLAM HYDROCHLORIDE 0.5 MG: 1 INJECTION, SOLUTION INTRAMUSCULAR; INTRAVENOUS at 09:26

## 2024-09-21 RX ADMIN — ASPIRIN 81 MG: 81 TABLET, COATED ORAL at 08:11

## 2024-09-21 RX ADMIN — ONDANSETRON 4 MG: 2 INJECTION INTRAMUSCULAR; INTRAVENOUS at 13:17

## 2024-09-21 RX ADMIN — TACROLIMUS 3 MG: 1 CAPSULE ORAL at 20:36

## 2024-09-21 RX ADMIN — DAPSONE 50 MG: 25 TABLET ORAL at 08:11

## 2024-09-21 RX ADMIN — MIDAZOLAM HYDROCHLORIDE 0.5 MG: 1 INJECTION, SOLUTION INTRAMUSCULAR; INTRAVENOUS at 10:26

## 2024-09-21 RX ADMIN — FENTANYL CITRATE 25 MCG: 50 INJECTION, SOLUTION INTRAMUSCULAR; INTRAVENOUS at 11:24

## 2024-09-21 RX ADMIN — ONDANSETRON 4 MG: 2 INJECTION INTRAMUSCULAR; INTRAVENOUS at 08:56

## 2024-09-21 RX ADMIN — MONTELUKAST 10 MG: 10 TABLET, FILM COATED ORAL at 20:36

## 2024-09-21 RX ADMIN — FENTANYL CITRATE 25 MCG: 50 INJECTION, SOLUTION INTRAMUSCULAR; INTRAVENOUS at 09:18

## 2024-09-21 RX ADMIN — MIDAZOLAM HYDROCHLORIDE 0.5 MG: 1 INJECTION, SOLUTION INTRAMUSCULAR; INTRAVENOUS at 10:50

## 2024-09-21 RX ADMIN — Medication 2 G: at 09:13

## 2024-09-21 RX ADMIN — FENTANYL CITRATE 25 MCG: 50 INJECTION, SOLUTION INTRAMUSCULAR; INTRAVENOUS at 10:10

## 2024-09-21 RX ADMIN — CARVEDILOL 6.25 MG: 3.12 TABLET, FILM COATED ORAL at 08:21

## 2024-09-21 RX ADMIN — HYDROMORPHONE HYDROCHLORIDE 0.5 MG: 0.2 INJECTION, SOLUTION INTRAMUSCULAR; INTRAVENOUS; SUBCUTANEOUS at 02:08

## 2024-09-21 RX ADMIN — SODIUM CHLORIDE: 9 INJECTION, SOLUTION INTRAVENOUS at 13:17

## 2024-09-21 RX ADMIN — FENTANYL CITRATE 25 MCG: 50 INJECTION, SOLUTION INTRAMUSCULAR; INTRAVENOUS at 09:35

## 2024-09-21 RX ADMIN — IODIXANOL 200 ML: 320 INJECTION, SOLUTION INTRAVASCULAR at 11:26

## 2024-09-21 RX ADMIN — HYDROMORPHONE HYDROCHLORIDE 0.5 MG: 0.2 INJECTION, SOLUTION INTRAMUSCULAR; INTRAVENOUS; SUBCUTANEOUS at 06:19

## 2024-09-21 RX ADMIN — ALTEPLASE 2 MG: 2.2 INJECTION, POWDER, LYOPHILIZED, FOR SOLUTION INTRAVENOUS at 10:41

## 2024-09-21 RX ADMIN — SODIUM CHLORIDE: 9 INJECTION, SOLUTION INTRAVENOUS at 02:03

## 2024-09-21 RX ADMIN — Medication 2 CAPSULE: at 20:36

## 2024-09-21 RX ADMIN — FENTANYL CITRATE 50 MCG: 50 INJECTION, SOLUTION INTRAMUSCULAR; INTRAVENOUS at 09:10

## 2024-09-21 RX ADMIN — CARVEDILOL 6.25 MG: 3.12 TABLET, FILM COATED ORAL at 18:39

## 2024-09-21 RX ADMIN — HYDROMORPHONE HYDROCHLORIDE 0.5 MG: 0.2 INJECTION, SOLUTION INTRAMUSCULAR; INTRAVENOUS; SUBCUTANEOUS at 15:31

## 2024-09-21 RX ADMIN — TACROLIMUS 3 MG: 1 CAPSULE ORAL at 08:11

## 2024-09-21 RX ADMIN — VENLAFAXINE HYDROCHLORIDE 37.5 MG: 37.5 CAPSULE, EXTENDED RELEASE ORAL at 08:11

## 2024-09-21 RX ADMIN — INSULIN ASPART 1 UNITS: 100 INJECTION, SOLUTION INTRAVENOUS; SUBCUTANEOUS at 20:36

## 2024-09-21 RX ADMIN — PREDNISONE 2.5 MG: 2.5 TABLET ORAL at 20:36

## 2024-09-21 RX ADMIN — ACYCLOVIR 400 MG: 400 TABLET ORAL at 20:36

## 2024-09-21 RX ADMIN — PANTOPRAZOLE SODIUM 40 MG: 40 TABLET, DELAYED RELEASE ORAL at 08:11

## 2024-09-21 RX ADMIN — FENTANYL CITRATE 25 MCG: 50 INJECTION, SOLUTION INTRAMUSCULAR; INTRAVENOUS at 11:10

## 2024-09-21 RX ADMIN — MIDAZOLAM HYDROCHLORIDE 0.5 MG: 1 INJECTION, SOLUTION INTRAMUSCULAR; INTRAVENOUS at 09:44

## 2024-09-21 RX ADMIN — PREDNISONE 5 MG: 5 TABLET ORAL at 08:21

## 2024-09-21 RX ADMIN — HYDROMORPHONE HYDROCHLORIDE 0.5 MG: 0.2 INJECTION, SOLUTION INTRAMUSCULAR; INTRAVENOUS; SUBCUTANEOUS at 04:46

## 2024-09-21 RX ADMIN — FENTANYL CITRATE 25 MCG: 50 INJECTION, SOLUTION INTRAMUSCULAR; INTRAVENOUS at 10:17

## 2024-09-21 RX ADMIN — ACYCLOVIR 400 MG: 400 TABLET ORAL at 08:11

## 2024-09-21 RX ADMIN — Medication 0.15 MG/HR: at 11:42

## 2024-09-21 RX ADMIN — SODIUM CHLORIDE 50000 MCG: 9 INJECTION, SOLUTION INTRAVENOUS at 09:24

## 2024-09-21 RX ADMIN — FENTANYL CITRATE 25 MCG: 50 INJECTION, SOLUTION INTRAMUSCULAR; INTRAVENOUS at 10:47

## 2024-09-21 RX ADMIN — DOCUSATE SODIUM 100 MG: 100 CAPSULE, LIQUID FILLED ORAL at 20:36

## 2024-09-21 RX ADMIN — ONDANSETRON 4 MG: 2 INJECTION INTRAMUSCULAR; INTRAVENOUS at 03:00

## 2024-09-21 RX ADMIN — ACETAMINOPHEN 650 MG: 325 TABLET ORAL at 20:37

## 2024-09-21 RX ADMIN — MIDAZOLAM HYDROCHLORIDE 0.5 MG: 1 INJECTION, SOLUTION INTRAMUSCULAR; INTRAVENOUS at 10:14

## 2024-09-21 ASSESSMENT — ACTIVITIES OF DAILY LIVING (ADL)
ADLS_ACUITY_SCORE: 35
ADLS_ACUITY_SCORE: 49
ADLS_ACUITY_SCORE: 42
ADLS_ACUITY_SCORE: 35
ADLS_ACUITY_SCORE: 45
ADLS_ACUITY_SCORE: 50
ADLS_ACUITY_SCORE: 35
ADLS_ACUITY_SCORE: 35
ADLS_ACUITY_SCORE: 43
ADLS_ACUITY_SCORE: 35
DEPENDENT_IADLS:: CLEANING;COOKING;LAUNDRY;SHOPPING;MEAL PREPARATION;MONEY MANAGEMENT;TRANSPORTATION
ADLS_ACUITY_SCORE: 42
ADLS_ACUITY_SCORE: 35
ADLS_ACUITY_SCORE: 50
ADLS_ACUITY_SCORE: 49
ADLS_ACUITY_SCORE: 35
ADLS_ACUITY_SCORE: 45
ADLS_ACUITY_SCORE: 35
ADLS_ACUITY_SCORE: 42
ADLS_ACUITY_SCORE: 49
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
## 2024-09-21 NOTE — PROCEDURES
[facility]    Procedure: IR Procedure Note    Date/Time: 9/21/2024 11:27 AM    Performed by: Eduardo Dewey MD  Authorized by: Eduardo Dewey MD  IR Fellow Physician:    Pre Procedure Diagnosis: PVD with acute LLE CLI  Post Procedure Diagnosis: same    UNIVERSAL PROTOCOL   Site Marked: NA  Prior Images Obtained and Reviewed:  Yes  Required items: Required blood products, implants, devices and special equipment available    Patient identity confirmed:  Verbally with patient, arm band, provided demographic data and hospital-assigned identification number  Patient was reevaluated immediately before administering moderate or deep sedation or anesthesia  Confirmation Checklist:  Patient's identity using two indicators, correct equipment/implants were available, procedure was appropriate and matched the consent or emergent situation and relevant allergies  Time out: Immediately prior to the procedure a time out was called    Universal Protocol: the Joint Commission Universal Protocol was followed    Preparation: Patient was prepped and draped in usual sterile fashion      SEDATION  Patient Sedated: Yes    Vital signs: Vital signs monitored during sedation    Findings: 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 PFA origin stenosis treated with 5 mm DCB. Dissection into TP trunk. 3 vessel tibial occlusive disease, worse than baseline. TNK initiated via endhole catheter in above-knee pop segment in an attempt to salvage severely diseased tibial runoff. No effective flow below ankle at this point.    Specimens: none    Procedural Complications: None    Condition: Stable    Plan: TNK arterial lytics LLE overnight, hold Argatroban. F/U angiogram tomorrow. Low threshold for discontinuing lytics for any bleeding complication.      PROCEDURE  Describe Procedure: Acute left SFA and popliteal artery occlusion related  to dissection superimposed on chronic disease, successfully recanalized and reconstructed with stents. PFA origin stenosis treated with 5 mm DCB. Dissection into TP trunk. 3 vessel tibial occlusive disease, worse than baseline. TNK initiated via endhole catheter in above-knee pop segment in an attempt to salvage severely diseased tibial runoff. No effective flow below ankle at this point.  Length of time physician/provider present for 1:1 monitoring during sedation:  128-141 min

## 2024-09-21 NOTE — ED NOTES
Bed: JNED-27  Expected date: 9/20/24  Expected time: 7:31 PM  Means of arrival: Ambulance  Comments:  [facility]   69M  Leg Pain  Angio today

## 2024-09-21 NOTE — PLAN OF CARE
Problem: Mobility Impairment  Goal: Optimal Mobility  Outcome: Not Progressing  Intervention: Optimize Mobility  Recent Flowsheet Documentation  Taken 9/21/2024 0310 by Cathy Wilcox, RN  Activity Management: bedrest     Problem: Pain Acute  Goal: Optimal Pain Control and Function  Outcome: Not Progressing     Goal Outcome Evaluation:    Patient transferred to floor from ED at 3:00 am and is alert, oriented and able to make needs known.  States he has never been in this much pain in his life.      Patient received .5 mg dilaudid     this shift per MAR.    /69 (BP Location: Left arm)   Pulse 101   Temp 98.7  F (37.1  C) (Oral)   Resp 18   SpO2 90%     Cathy Wilcox RN
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
## 2024-09-21 NOTE — OP NOTE
"VASCULAR SURGERY ANGIOGRAM REPORT      [facility]    DATE: 09/20/24      PROCEDURES PERFORMED:     1.  Ultrasound-guided access of left common femoral artery  2.  Selective cannulation of the left superficial femoral artery, popliteal artery, anterior tibial artery, and dorsalis pedis artery  3. Plain balloon angioplasty of the left anterior tibial artery with 2 mm x 100 mm balloon  4. Moderate sedation  5. Arteriotomy closure with manual pressure    PROVIDER:   MD SHEYLA Murcia MD    INDICATION:   This 69 year-old male with a history of lung transplant, bilateral chronic limb-threatening ischemia, and recent right below-knee amputation presented for left lower extremity angiogram. After discussion of risks and benefits he elected to proceed.    SEDATION:     The procedure was performed with administration of intravenous conscious sedation with appropriate preoperative, intraoperative, and postoperative evaluation.   83 minutes of supervised face to face intraservice time was provided by a radiology nurse under my direct supervision.     ANTIBIOTICS: Ancef, 2 g  FLUOROSCOPIC TIME: 25.0 min  RADIATION DOSE: 94 mGy  CONTRAST: 30 mL (Omni 350)     PROCEDURE:     Ultrasound was used to evaluate the left common femoral artery. The selected vessel was patent, though noted to have significant posterior wall plaque distally. Ultrasound was then used for real-time ultrasound guided needle entry of the  left common femoral artery. Permanent images were recorded and saved to the patient's medical record.  Micropuncture sheath was inserted. A Nitrex wire was advanced into the superficial femoral artery under ultrasound guidance. Micropuncture sheath was advanced over the wire and placemen in the superficial femoral artery confirmed on fluoroscopy.   An O.035\" [place] wire advantage was advanced into the distal superficial femoral artery.  The micropuncture sheath was removed and a short, 5 " "Urdu sheath was then placed in the left common femoral artery into the superficial femoral artery.  An angled, 4 Fr 0.035\" Navicross catheter was then advanced over the wire, and this wire and catheter combination was used to cross the superficial femoral artery stenosis.  Selective angiogram of the left popliteal artery and distal runoff confirmed the catheter tip was intraluminal.   The short 5 Urdu sheath was then removed and replaced with a 55 cm 5 Fr sheath, with its tip placed in the left below-knee popliteal artery.  Argatroban bolus and infusions was started. The Glidewire advantage was then used to select the left anterior tibial artery.  We were unable to cross the distal left anterior tibial artery occlusion using an 0.035\" Glidewire advantage.  The above-mentioned [place] cross catheter was used to then exchanged the 035 Glidewire advantage for an 014 Glidewire advantage.  035 [place] cross catheter was removed and replaced with a straight, 0.018\" Navicross catheter.  The distal left anterior tibial artery occlusion was then crossed with the 018 Glidewire advantage and the dorsalis pedis artery was selected. A 3 mm x 100 balloon was advanced to the mid anterior tibial artery and inflated. Note the balloon could not be advanced any further than this segment before inflation. We then exchanged for a 2 mm x 100 mm balloon but were unable to advance the balloon over the 0.014\" glide wire advantage. The balloon was removed over the wire and the 0.018\" Navicross catheter was advanced over the wire to the dorsalis pedis artery. The 0.014\" glide wire advantage was then exchanged for an 0.018\" glidewire advantage. We then made multiple attempts at advancing a new 2 mm balloon past the distal anterior tibial artery occlusion, however the balloon would not track over the wire. At that point the procedure was concluded.  The arteriotomy closure was performed using manual pressure.  Patient tolerated procedure well and " was transferred to recovery area in stable condition.    FINDINGS   Moderate stenosis of the distal left common femoral artery  Long segment left superficial femoral artery occlusion, new since prior diagnostic angiogram on 7/9/24  Reconstitution of the left popliteal artery, proximal peroneal and anterior tibial artery are patent, however occlude at the mid calf. Left posterior tibial artery is occluded. Very faint plantar branches and dorsalis pedis artery reconstitute in the foot    IMPRESSION   Plain balloon angioplasty of the mid segment left anterior tibial artery, however we were unable to advance a 2 mm balloon past the distal anterior tibial artery stenosis after multiple attempts with different wire combination. He will follow-up in vascular surgery clinic and we will discuss the possibility of additional attempts at tibial artery recannalization vs. primary below-knee amputation.

## 2024-09-21 NOTE — CONSULTS
Care Management Initial Consult    General Information  Assessment completed with: Patient,    Type of CM/SW Visit: Initial Assessment    Primary Care Provider verified and updated as needed: Yes   Readmission within the last 30 days: no previous admission in last 30 days, other (see comments)   Return Category: New Diagnosis     Advance Care Planning: Advance Care Planning Reviewed: present on chart          Communication Assessment  Patient's communication style: spoken language (English or Bilingual)        Wear Glasses or Blind: yes    Cognitive  Cognitive/Neuro/Behavioral: WDL                      Living Environment:   People in home: spouse (mother in law)     Current living Arrangements: house      Able to return to prior arrangements: yes       Family/Social Support:  Care provided by: self, spouse/significant other  Provides care for: no one, unable/limited ability to care for self  Marital Status:   Support system: Wife          Description of Support System: Supportive, Involved         Current Resources:   Patient receiving home care services: No        Community Resources: None  Equipment currently used at home: walker, standard  Supplies currently used at home: None      Does the patient's insurance plan have a 3 day qualifying hospital stay waiver?  No    Lifestyle & Psychosocial Needs:  Social Determinants of Health     Food Insecurity: Low Risk  (9/21/2024)    Food Insecurity     Within the past 12 months, did you worry that your food would run out before you got money to buy more?: No     Within the past 12 months, did the food you bought just not last and you didn t have money to get more?: No   Depression: Not at risk (8/20/2024)    PHQ-2     PHQ-2 Score: 0   Housing Stability: Low Risk  (9/21/2024)    Housing Stability     Do you have housing? : Yes     Are you worried about losing your housing?: No   Tobacco Use: Medium Risk (9/20/2024)    Patient History     Smoking Tobacco Use: Former      Smokeless Tobacco Use: Never     Passive Exposure: Past   Financial Resource Strain: Low Risk  (9/21/2024)    Financial Resource Strain     Within the past 12 months, have you or your family members you live with been unable to get utilities (heat, electricity) when it was really needed?: No   Alcohol Use: Not At Risk (9/10/2019)    AUDIT-C     Frequency of Alcohol Consumption: Never     Average Number of Drinks: Not on file     Frequency of Binge Drinking: Not on file   Transportation Needs: Low Risk  (9/21/2024)    Transportation Needs     Within the past 12 months, has lack of transportation kept you from medical appointments, getting your medicines, non-medical meetings or appointments, work, or from getting things that you need?: No   Physical Activity: Not on file   Interpersonal Safety: Low Risk  (9/21/2024)    Interpersonal Safety     Do you feel physically and emotionally safe where you currently live?: Yes     Within the past 12 months, have you been hit, slapped, kicked or otherwise physically hurt by someone?: No     Within the past 12 months, have you been humiliated or emotionally abused in other ways by your partner or ex-partner?: No   Stress: Not on file   Social Connections: Not on file   Health Literacy: Not on file       Functional Status:  Prior to admission patient needed assistance:   Dependent ADLs:: Bathing, Dressing, Transfers, Toileting  Dependent IADLs:: Cleaning, Cooking, Laundry, Shopping, Meal Preparation, Money Management, Transportation       Discussed  Partnership in Safe Discharge Planning  document with patient/family: No    Additional Information:    Assessment completed with patient. Patient recently in hospital 8/25/24-9/4/24; discharged to [facility]. Prior to hospitalizations, patient reports living in his house with spouse, independent with ADLs/IADLs and ambulated with walker.  He states discharge plan is to return to [facility]. Spouse is primary family contact.  Transportation at discharge TBD.    [facility]. Referral sent with request to confirm return and bed hold.            Marcy Rincon RN

## 2024-09-21 NOTE — ED NOTES
[facility] ED Handoff Report    ED Chief Complaint: Left leg pain.    ED Diagnosis:  (M79.662) Pain of left lower leg  Comment: Patient reports pain on left ankle up to the lower knee. Foot is cold to touch and patient is known to have neuropathy.  Plan: Pain medication given as prescribed.     (L97.529) Ulcer of foot, chronic, left, with unspecified severity (H)  Comment:   Plan: Patient is a right leg amputee.    (I73.9) PAD (peripheral artery disease) (H24)  Comment:   Plan:     (I70.209) Arterial occlusion, lower extremity (H24)  Comment: Left anterior tibial artery  Plan: Anticoagulant infusion on going.       PMH:    Past Medical History:   Diagnosis Date    Acute postoperative pain 09/11/2013    Alpha-1-antitrypsin deficiency (H)     Arthritis     Basal cell carcinoma     CMV (cytomegalovirus infection) (H)     Reacttivation Sept 2013 when valcyte held    Coronary artery disease     DVT of upper extremity (deep vein thrombosis) (H) 09/2013    Nonocclusive thrombosis extending from the right subclavian vein to the right axillary vein,  Segmental occlusion of right basilic vein in the upper arm. 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    With DVT and thrombocytopenia    Hypertension     Lung transplant status, bilateral (H) 09/08/2013    Complicated by HIT and esophageal dysfunction    Pneumonia of right lower lobe due to Pseudomonas species (H) 02/28/2019    Sepsis associated hypotension (H) 02/24/2019    Squamous cell carcinoma     Stented coronary artery     Steroid-induced diabetes mellitus (H24)     Thrombocytopaenia     due to HIT    Ureteral stone 10/17/2017        Code Status:  Full Code     Falls Risk: Yes Band: Applied    Current Living Situation/Residence: lives with a significant other      Elimination Status: Continent: Yes     Activity Level: 2 assist    Patients Preferred Language:  English     Needed: No    Vital Signs:  /67   Pulse 101   Temp 98.7  F (37.1  C) (Oral)   Resp 19   SpO2 93%      Cardiac Rhythm:     Pain Score: 8/10    Is the Patient Confused:  No    Last Food or Drink: 9/20/24    Focused Assessment:  Pain medication and on anti coagulant.    Tests Performed: Done: Imaging and labs.    Treatments Provided:  Argatroban infusion on going.    Family Dynamics/Concerns: No    Family Updated On Visitor Policy: No    Plan of Care Communicated to Family: No    Who Was Updated about Plan of Care:      Belongings Checklist Done and Signed by Patient: Yes    Belongings Sent with Patient:     Medications sent with patient:     Covid: asymptomatic    Additional Information: Patient has an on going Argatroban infusion on the right AC and normal saline on the right wrist, both are g18.    RN: Juan Haley   9/21/2024 2:17 AM

## 2024-09-21 NOTE — ED TRIAGE NOTES
Brought in by EMS from TCU due to left leg pain. Patient had angiogram today and went back to TCU. Had 2 doses of Dilauded PO but pain is still 10/10. Patient had right leg  amputation done in Aug 28. Known to have neuropathy. Blood sugar was 124mg/dl from EMS.
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
## 2024-09-21 NOTE — PROGRESS NOTES
Vascular Surgery Progress Note  09/21/2024       Subjective:  - No major changes since exam last night. Patient states that his pain is slightly improved compared to last night, but still present.    Following procedure, patient's pain is a 5-6/10, better controlled with pain meds.      Objective:  Temp:  [97.7  F (36.5  C)-98.7  F (37.1  C)] 98.3  F (36.8  C)  Pulse:  [] 103  Resp:  [12-30] 18  BP: (117-190)/() 153/79  SpO2:  [90 %-97 %] 95 %    I/O last 3 completed shifts:  In: -   Out: 300 [Urine:300]      Gen: Awake, alert, NAD  Resp: NLB on RA  LLExt: Monophasic popliteal artery, palpable femoral pulse. Cool ankle and foot. Motor and sensory intact, similar exam to last night    Following procedure, strong monophasic popliteal signal, no pedal signals. LLE cool at level of ankle and below. Compartments soft, minimally tender     Labs:  Recent Labs   Lab 09/20/24 2024 09/20/24  0657   WBC 8.1 7.6   HGB 9.4* 11.9*    252       Recent Labs   Lab 09/21/24  0717 09/21/24  0643 09/21/24  0215 09/20/24 2024 09/20/24  1032 09/20/24  0657   NA  --   --   --  142  --  139   POTASSIUM  --   --   --  4.0  --  4.0   CHLORIDE  --   --   --  107  --  102   CO2  --   --   --  25  --  25   BUN  --   --   --  26.6*  --  28.5*   CR  --   --   --  0.89  --  0.83   * 93 117* 113*   < > 80   ERIN  --   --   --  7.5*  --  8.3*    < > = values in this interval not displayed.       Imaging:  CTA Abdomen Pelvis Runoff w Contrast   Final Result   Abnormal   IMPRESSION:   1.  Acute segmental and subsegmental pulmonary emboli in the left lower lobe.      2.  Right lower extremity: Extensive atherosclerotic calcifications. The common iliac, external iliac, internal iliac, and common femoral arteries are patent. The deep femoral artery is occluded. The superficial femoral artery is patent. The popliteal    artery is occluded. The proximal anterior tibial artery is occluded. The tibioperoneal trunk is occluded.  There is reconstitution of the posterior tibial and peroneal arteries via collaterals. Below the knee amputation.      3.   Left lower extremity: The left common iliac and left external iliac arteries are patent. The left internal iliac artery is occluded with distal reconstitution. The left common femoral artery is patent. The left deep femoral artery is patent. The    left superficial femoral artery is occluded. There is reconstitution of the popliteal artery via collateral flow. The distal popliteal artery is occluded, new from recent angiogram. Evaluation of the anterior tibial, posterior tibial, and peroneal    arteries is limited due to extensive atherosclerotic calcifications. The tibioperoneal trunk is likely occluded. The anterior tibial artery appears occluded, new from recent angiogram. The peroneal artery is occluded proximally, new from recent    angiogram, with distal reconstitution, with flow to the level of the foot. The proximal posterior tibial artery is occluded with distal reconstitution, with flow to the level of the foot.      4.  Since April 2024, slight increase in size of a large cystic mass in the pancreas.         [Critical Result: New diagnosis of pulmonary embolism]      Finding was identified on 9/21/2024 12:10 AM CDT.       Dr. Venegas was contacted by me on 9/21/2024 12:28 AM CDT and verbalized understanding of the critical result.          IR Lower Extremity Angiogram Left    (Results Pending)        Assessment/Plan:   69 year old male with new rest pain following LLE angiogram and AT PTA, now with distal popliteal and AT occlusions. Limited revascularization issues.    - IR consulted, planning intervention this morning  - Continue argatroban gtt  - NPO until after IR procedure, then ok with diet     Seen, examined, and discussed with staff.  - - - - - - - - - - - - - - - - - -  Tanner Hernandez MD  Vascular Surgery Resident        Post-Angiogram Addendum:  No significant flow below the  level of the ankle after SFA and popliteal stenting. Lytics catheter placed distal to this with plan to run for 24 hours and reassess in AM. Patient remains at high risk for needing BKA.    - q6h CBC, fibrinogen, and coags. If fibrinogen drops <200, please contact IR immediately  - Continue to monitor for groin hematoma. If expanding, would plan to stop lytics  - Strict bedrest  - Regular diet, NPO at midnight  - q2h neuro and compartment checks

## 2024-09-21 NOTE — PRE-PROCEDURE
GENERAL PRE-PROCEDURE:   Procedure:  Left leg angiogram with intervention  Date/Time:  9/21/2024 9:03 AM    Written consent obtained?: Yes    Risks and benefits: Risks, benefits and alternatives were discussed    Consent given by:  Patient  Patient states understanding of procedure being performed: Yes    Patient's understanding of procedure matches consent: Yes    Procedure consent matches procedure scheduled: Yes    Expected level of sedation:  Moderate  Appropriately NPO:  Yes  ASA Class:  3  Mallampati  :  Grade 2- soft palate, base of uvula, tonsillar pillars, and portion of posterior pharyngeal wall visible  Lungs:  Lungs clear with good breath sounds bilaterally  Heart:  Normal heart sounds and rate  History & Physical reviewed:  History and physical reviewed and no updates needed  Statement of review:  I have reviewed the lab findings, diagnostic data, medications, and the plan for sedation

## 2024-09-21 NOTE — IR NOTE
Interventional Radiology Intra-procedural Nursing Note  Patient Name: Aubrey Duncan  Medical Record Number: [id number]  Today's Date: September 21, 2024    Procedure: left lower extremity  Start time: 0905  End time: 1124  Sedation time: 140    Administered medication totals:  Versed 4.5 mg IVP  Fentanyl 225 mcg IVP    Last dose of sedation administered at 1124.   Procedure well tolerated by patient without complications. Procedure end with debrief by Dr. Dewey. Bedside report given to Annette HERRERA RN, groin site checked, sheath in place. Drips running. Wife Kyung is present at bedside, verbalized understanding of plan of care.           Note: Patient entered Interventional Radiology Suite number 1 via cart. Patient awake, alert and orientated. Assisted onto procedural table in supine position. Prepped and draped.  Dr. Dewey in room. Time out and procedure started. Vital signs stable. Telemetry reading NSR.

## 2024-09-21 NOTE — PHARMACY-ADMISSION MEDICATION HISTORY
Pharmacist Admission Medication History    Admission medication history is complete. The information provided in this note is only as accurate as the sources available at the time of the update.    Information Source(s): Patient, Family member, and Facility (TCU/NH/) medication list/MAR via in-person    Pertinent Information: Updated medication list per [facility] med list. Unfortunately, the list did not include last doses and I suspect patient's schedule was not precise as patient was at the hospital earlier today for angiogram. I did ask the patient if he received any medications during the time he returned to TCU this afternoon and he said he was given meds with supper, but vomited them back up. He knows he still needs to get his tacrolimus this evening.    Changes made to PTA medication list:  Added: None  Deleted: omeprazole  Changed: None    Medication History Completed By: Ayse Rodriguez [facility] 9/20/2024 9:25 PM    PTA Med List   Medication Sig Last Dose    acetaminophen (TYLENOL) 325 MG tablet Take 2 tablets (650 mg) by mouth every 6 hours as needed for mild pain Unknown    acyclovir (ZOVIRAX) 400 MG tablet TAKE 1 TABLET (400 MG) BY MOUTH 2 TIMES DAILY     albuterol (PROAIR HFA/PROVENTIL HFA/VENTOLIN HFA) 108 (90 Base) MCG/ACT inhaler Inhale 1-2 puffs into the lungs every 6 hours as needed for shortness of breath or wheezing Unknown    aspirin (ASA) 81 MG EC tablet Take 1 tablet (81 mg) by mouth daily     azithromycin (ZITHROMAX) 250 MG tablet Take 250 mg by mouth Every Mon, Wed, Fri Morning     Calcium Carbonate-Vitamin D 600-10 MG-MCG TABS Take 1 tablet by mouth 2 times daily (with meals)     carvedilol (COREG) 6.25 MG tablet TAKE 1 TABLET (6.25 MG) BY MOUTH 2 TIMES DAILY (WITH MEALS)     clopidogrel (PLAVIX) 75 MG tablet Take 1 tablet (75 mg) by mouth daily     dapsone (ACZONE) 25 MG tablet Take 2 tablets (50 mg) by mouth daily     diclofenac (VOLTAREN) 1 % topical gel Apply 2 g topically 2 times  daily as needed for moderate pain     econazole nitrate 1 % external cream APPLY TOPICALLY DAILY TO FEET AND HEELS.     Ferrous Sulfate Dried (HIGH POTENCY IRON) 65 MG TABS Take 1 tablet by mouth every morning.     fludrocortisone (FLORINEF) 0.1 MG tablet Take 1 tablet (0.1 mg) by mouth daily     fluticasone-salmeterol (ADVAIR-HFA) 230-21 MCG/ACT inhaler Inhale 2 puffs into the lungs 2 times daily     furosemide (LASIX) 20 MG tablet Take 1 tablet (20 mg) by mouth daily     HYDROmorphone (DILAUDID) 2 MG tablet Take 0.5 tablets (1 mg) by mouth every 6 hours as needed for severe pain.     insulin aspart (NOVOLOG FLEXPEN) 100 UNIT/ML pen Inject 5 Units subcutaneously daily (with breakfast). Do not give if blood sugar < 70 mg/dL.     insulin aspart (NOVOLOG PEN) 100 UNIT/ML pen Inject 3 Units subcutaneously 2 times daily (with meals). Lunch & supper     insulin glargine (LANTUS PEN) 100 UNIT/ML pen Inject 18 Units Subcutaneous every morning (before breakfast)     loperamide (IMODIUM) 2 MG capsule Take 1 capsule (2 mg) by mouth 4 times daily as needed for diarrhea     magnesium gluconate (MAGONATE) 500 MG tablet Take 1 tablet (500 mg) by mouth at bedtime.     metoclopramide (REGLAN) 5 MG tablet Take 1 tablet (5 mg) by mouth every 6 hours as needed (nausea)     montelukast (SINGULAIR) 10 MG tablet Take 1 tablet (10 mg) by mouth every evening     multivitamin, therapeutic (THERA-VIT) TABS Take 1 tablet by mouth daily     pantoprazole (PROTONIX) 40 MG EC tablet Take 1 tablet (40 mg) by mouth daily     predniSONE (DELTASONE) 5 MG tablet Take 5 mg by mouth every morning. In addition, take one half tablet (2.5 mg) in the evening     predniSONE (DELTASONE) 5 MG tablet Take 2.5 mg by mouth every evening. In addition, take 1 tablet (5 mg) in the morning.     psyllium (METAMUCIL/KONSYL) capsule Take 2 capsules by mouth 2 times daily.     rosuvastatin (CRESTOR) 40 MG tablet Take 20 mg by mouth Every Mon, Wed, Fri Morning      tacrolimus (GENERIC EQUIVALENT) 1 MG capsule Take 3 capsules (3 mg) by mouth 2 times daily. Total dose: 3 mg in AM and 3 mg in PM     venlafaxine (EFFEXOR XR) 37.5 MG 24 hr capsule Take 37.5 mg by mouth daily.

## 2024-09-21 NOTE — PROGRESS NOTES
[facility]    Medicine Progress Note - Hospitalist Service    Date of Admission:  9/20/2024    Assessment & Plan   Aubrey Duncan is a 69 year old male with history of double lung transplant due to alpha-1 antitrypsin deficiency, prior HITS, type 2 diabetes, bilateral chronic limb-threatening ischemia status post recent right BKA, hypertension and GERD admitted on 9/20/2024 due to worsening left foot and leg pain and was found to have new left acute distal popliteal artery and anterior tibial artery occlusion.      CTA revealed extensive occlusion of both deep and superficial arteries in the left lower extremity. Vascular surgery service performed balloon angioplasty of the left anterior tibial artery on 9/21/2024.  Notable operative findings include moderate stenosis of the distal left common femoral artery, new long-segment occlusion of the left superficial femoral artery, reconstitution of the left popliteal artery, and occluded left posterior tibial artery.      IR service was consulted by vascular surgery service for revascularization. Successful recanalization and reconstruction with stents was performed by IR on 9/21/2024 and patient was subsequently placed on tenecteplase. Interventional radiologist recommends tenecteplase overnight for left lower extremity with plan to hold argatroban and perform follow-up angiogram tomorrow.       Bilateral critical limb ischemia  --Hold argatroban for now as patient is receiving tenecteplase  --Continue tenecteplase per IR recommendation  --Angiogram tomorrow per IR  --Vascular surgery [id number] assistance  --Continue aspirin  --Monitor for compartment syndrome      Acute bilateral PE  --Hold argatroban for now as patient is receiving tenecteplase  -- Resume argatroban when off tenecteplase  --Hold Plavix while on argatroban.  --Unable to afford Plavix since the co-pay was $5000.    Insulin-dependent diabetes  --Continue 5 units of  Lantus and sliding scale insulin  --Currently n.p.o.    History of double lung transplant due to alpha-1 antitrypsin deficiency  --Continue antirejection medications  -- Continue inhalers    Recent right below-knee amputation due to PVD  --Continue aspirin Plavix    Essential hypertension  -Hold Lasix due to possible procedure    GERD  -- Continue Protonix    Orthostatic hypotension  -- Continue PTA Florinef          Diet: Regular Diet Adult  NPO per Anesthesia Guidelines for Procedure/Surgery Except for: Meds    DVT Prophylaxis: Argatroban   Naranjo Catheter: Not present  Lines: PRESENT             Cardiac Monitoring: None  Code Status: Full Code      Clinically Significant Risk Factors Present on Admission              # Hypoalbuminemia: Lowest albumin = 2.9 g/dL at 9/20/2024  8:24 PM, will monitor as appropriate   # Drug Induced Platelet Defect: home medication list includes an antiplatelet medication   # Hypertension: Noted on problem list    # Anemia: based on hgb <11           # Financial/Environmental Concerns:           Disposition Plan     Medically Ready for Discharge: Anticipated in 2-4 Days             Jacinto Pierce MD  Hospitalist Service  [facility]  Securely message with [employer] (more info)  Text page via [employer] Paging/Directory   ______________________________________________________________________    Interval History   Seen postprocedure.  He states he is experiencing pain around the middle of the left leg.  He is requiring 1 L of intranasal oxygen.    Physical Exam   Vital Signs: Temp: 98.1  F (36.7  C) Temp src: Oral BP: 128/61 Pulse: 86   Resp: 13 SpO2: 97 % O2 Device: Nasal cannula Oxygen Delivery: 1 LPM  Weight: 136 lbs 14.49 oz    GEN: A&Ox3, no acute distress  NEURO: CN II-XII grossly intact. No gross neurologic deficits. Diminished sensation in the L medial foot, but patient reports this is baseline  NECK: trachea midline, no JVD  HEENT: No scleral icterus.  RESP:  Nonlabored breathing on room air  CV: RRR by femoral pulse, noncyanotic  MSK: S/P R BKA. Moves all extremities independently. Motor function intact in LLE but mildly limited due to pain.  Necrotic ulcers in the left toes.  L 1st toe with bluish discoloration. Cold extremities   PSYCH: cooperative   PULSES: LLE: Absent distal pulses        Medical Decision Making       40 MINUTES SPENT BY ME on the date of service doing chart review, history, exam, documentation & further activities per the note.      Data   Imaging results reviewed over the past 24 hrs:   Recent Results (from the past 24 hour(s))   CTA Abdomen Pelvis Runoff w Contrast   Result Value    Radiologist flags New diagnosis of pulmonary embolism (AA)    Narrative    EXAM: CTA ABDOMEN PELVIS RUNOFF W CONTRAST  LOCATION: [facility]  DATE: 9/20/2024    INDICATION: Left leg pain, pallor, cool after angiogram  COMPARISON: CT chest August 30, 2024, CT chest, abdomen, and pelvis 4/13/2024, 9/20/2024 left lower extremity angiogram  TECHNIQUE: Helical acquisition through the abdomen, pelvis, and bilateral lower extremities was performed during the arterial phase of contrast enhancement using IV Contrast. 2D and 3D reconstructions were performed by the CT technologist. Dose reduction   techniques were used.   CONTRAST: isovue 370 90ml    FINDINGS:  AORTA: Severe calcific atherosclerosis.    RIGHT LEG: Extensive atherosclerotic calcifications. The common iliac, external iliac, internal iliac, and common femoral arteries are patent. The deep femoral artery is occluded. The superficial femoral artery is patent. The popliteal artery is   occluded. The proximal anterior tibial artery is occluded. The tibioperoneal trunk is occluded. There is reconstitution of the posterior tibial and peroneal arteries via collaterals. Below the knee amputation.    LEFT LEG: The left common iliac and left external iliac arteries are patent. The left internal iliac  artery is occluded with distal reconstitution. The left common femoral artery is patent. The left deep femoral artery is patent. The left superficial   femoral artery is occluded. There is reconstitution of the popliteal artery via collateral flow. The distal popliteal artery is occluded. Evaluation of the anterior tibial, posterior tibial, and peroneal arteries is limited due to extensive   atherosclerotic calcifications. The tibioperoneal trunk is likely occluded. The anterior tibial artery is likely occluded. The peroneal artery is occluded proximally with distal reconstitution, with flow to the level of the foot. The proximal posterior   tibial artery is occluded with distal reconstitution, with flow to the level of the foot.    LUNG BASES: Acute segmental and subsegmental pulmonary emboli in the left lower lobe.    ABDOMEN: Unremarkable liver and gallbladder. Unremarkable spleen. Slight increase in size of a large cystic mass in the proximal pancreas measuring 4.3 x 5.2 cm, previously 3.8 x 4.9 cm on 4/13/2024. Unremarkable adrenal glands and kidneys. The bowel   appears unremarkable. No ascites. No lymphadenopathy.    PELVIS: Unremarkable bladder and prostate. No pelvic free fluid or lymphadenopathy.      Impression    IMPRESSION:  1.  Acute segmental and subsegmental pulmonary emboli in the left lower lobe.    2.  Right lower extremity: Extensive atherosclerotic calcifications. The common iliac, external iliac, internal iliac, and common femoral arteries are patent. The deep femoral artery is occluded. The superficial femoral artery is patent. The popliteal   artery is occluded. The proximal anterior tibial artery is occluded. The tibioperoneal trunk is occluded. There is reconstitution of the posterior tibial and peroneal arteries via collaterals. Below the knee amputation.    3.   Left lower extremity: The left common iliac and left external iliac arteries are patent. The left internal iliac artery is  occluded with distal reconstitution. The left common femoral artery is patent. The left deep femoral artery is patent. The   left superficial femoral artery is occluded. There is reconstitution of the popliteal artery via collateral flow. The distal popliteal artery is occluded, new from recent angiogram. Evaluation of the anterior tibial, posterior tibial, and peroneal   arteries is limited due to extensive atherosclerotic calcifications. The tibioperoneal trunk is likely occluded. The anterior tibial artery appears occluded, new from recent angiogram. The peroneal artery is occluded proximally, new from recent   angiogram, with distal reconstitution, with flow to the level of the foot. The proximal posterior tibial artery is occluded with distal reconstitution, with flow to the level of the foot.    4.  Since April 2024, slight increase in size of a large cystic mass in the pancreas.      [Critical Result: New diagnosis of pulmonary embolism]    Finding was identified on 9/21/2024 12:10 AM CDT.     Dr. Venegas was contacted by me on 9/21/2024 12:28 AM CDT and verbalized understanding of the critical result.      IR Lower Extremity Angiogram Left    [facility] RADIOLOGY  LOCATION: [facility]  DATE: 9/21/2024    PROCEDURE:   1.  ABDOMINAL AORTOGRAM   2.  LEFT LOWER EXTREMITY ANGIOGRAM  3.  LEFT LOWER EXTREMITY THIRD ORDER CATHETERIZATION WITH ADDITIONAL SECOND/THIRD ORDER CATHETERIZATION  4.  SELECTIVE ANGIOGRAM LEFT PROFUNDA FEMORAL ARTERY  5.  SELECTIVE ANGIOGRAM LEFT PERONEAL ARTERY  6.  PTA AND STENT LEFT SFA  7.  PTA AND STENT LEFT POPLITEAL ARTERY  8.  DRUG-COATED BALLOON ANGIOPLASTY LEFT PROFUNDA FEMORAL ARTERY  9.  ARTERIAL THROMBOLYTIC INFUSION, INITIAL DAY  10.  CONSCIOUS SEDATION    INTERVENTIONAL RADIOLOGIST: Eduardo Dewey MD    INDICATION: Severe peripheral vascular disease. 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 Acute critical left limb ischemia following left lower  extremity arterial interventions the previous day. New rest pain left foot. Patient currently on Argatroban   infusion.    MODERATE SEDATION: Versed 4.5 mg IV; Fentanyl 225 mcg IV. During the time out, immediately prior to the administration of medications, the patient was reassessed for adequacy to receive conscious sedation.  Under physician supervision, Versed and   fentanyl were administered for moderate sedation. Pulse oximetry, heart rate and blood pressure were continuously monitored by an independent trained observer. The physician spent 140 minutes of face-to-face sedation time with the patient.    CONTRAST: 200 cc Visipaque 320  ANTIBIOTICS: 2 g of IV Ancef  ADDITIONAL MEDICATIONS: TPA 2 mg intra-arterial left popliteal artery, Zofran 4 mg IV    FLUOROSCOPIC TIME: 49.8 minutes.  RADIATION DOSE: Air Kerma: 1341 mGy.    COMPLICATIONS: No immediate complications.    STERILE BARRIER TECHNIQUE: Maximum sterile barrier technique was used. Cutaneous antisepsis was performed at the operative site with application of 2% chlorhexidine and large sterile drape. Prior to the procedure, the  and assistant performed   hand hygiene and wore hat, mask, sterile gown, and sterile gloves during the entire procedure.    PROCEDURE:   Both groins were prepped and draped in usual sterile fashion followed by localization with 1% Xylocaine. Ultrasound revealed a patent right common femoral artery. An ultrasound image was obtained and placed in the patient's permanent medical record.   Using real-time ultrasound for needle placement and a micropuncture access system, the right common femoral artery was accessed followed by placement of a 5 Peruvian sheath. A 5 Peruvian Omni Flush catheter was advanced into the upper abdominal aorta   followed by a flush abdominal aortogram. The catheter was pulled back into the distal abdominal aorta followed by a flush pelvic angiogram. The catheter and wire were advanced into the contralateral  left common femoral artery followed by left lower   extremity angiography.    A 6 Angolan contralateral sheath was placed. Catheter wire advanced into the occluded left SFA. Occlusive dissection was encountered. The catheter was pulled back redirected into the diseased profunda femoral artery followed by left profundofemoral artery   angiography. The catheter was redirected back into the occluded SFA. Wire and catheter used to navigate through the occluded dissected SFA. Axis of was obtained to the diseased popliteal artery. Dissection and/or thrombus encountered. The SFA occlusion   was predilated using a 4 mm balloon. Drug-eluting stent reconstruction of the SFA from the above-knee popliteal artery segment through the origin of the SFA was performed with 3 overlapping 6 mm Iona drug-eluting stents. All SFA stents were postdilated   using a 5 mm balloon. Follow-up angiography was performed. Flow-limiting thrombus and/or dissection of the popliteal artery was then treated with a metal 5 x 60 mm Protege stent. Follow-up left lower artery angiography was performed. On a wire access   was secured in the left profunda femoral artery. 5 mm drug-coated balloon angioplasty was then performed using an IN.PACT Admiral drug-coated balloon. Follow-up profundofemoral artery angiography was performed. Access to the profunda femoral artery was   abandoned. Wire and catheter access were then again obtained to the left popliteal artery. More detailed angiography of the below-knee popliteal artery and diseased tibial vessels was performed. A catheters advanced selectively into the peroneal artery.   Selective peroneal artery angiography was performed. Attempts were made to cross the chronic segmental occlusion of the peroneal artery without success. A 5 Angolan enteral KMP catheter was advanced into the above-knee popliteal artery. Tenecteplase   thrombolytic infusion was initiated at 0.15 mg per hour in attempt to resolve any  nonocclusive or occlusive microemboli tibial vasculature.    FINDINGS:  ABDOMEN AND PELVIS: Normal caliber calcified abdominal aorta. Both renal arteries are widely patent. The common and external iliac arteries are patent. There is bulky calcified plaque and mild left common iliac artery stenosis and moderate mid left   external iliac artery stenosis. Left internal iliac arteries chronically occluded. The right internal iliac artery is patent    LEFT LOWER EXTREMITY: Calcified mildly stenotic CFA. Bulky calcified plaque and severe ostial stenosis of the PFA. The SFA is occluded from its origin throughout the thigh with short diseased segment reconstituted at the distal thigh. The popliteal   artery is occluded. At baseline, no effective flow below the knee joint.    PROFUNDOFEMORAL ARTERY ANGIOGRAM: Diffusely calcified with bulky severe calcified plaque and stenosis at its origin. This is the primary inflow to the leg at this point prior to intervention.    PERONEAL ARTERY ANGIOGRAM: Diffusely calcified with segmental acute occlusion at the mid calf. Unable to cross occlusion.    DRUG-COATED BALLOON ANGIOPLASTY LEFT PROFUNDA FEMORAL ARTERY: Severe calcified ostial stenosis of the left PFA is treated with 5 mm drug-coated balloon angioplasty.    PTA AND STENT LEFT FEMORAL-POPLITEAL ARTERY: Dissected and occluded chronically diseased SFA successfully crossed and reconstructed with 3 overlapping drug-eluting stents postdilated to 5 mm. Flow-limiting dissection and/or thrombus above-knee popliteal   artery segment treated with bare-metal self-expanding 5 x 60 mm stent. In-line flow reestablished to the knee.    ARTERIAL THROMBOLYTIC INFUSION, INITIAL DAY: Tenecteplase thrombolytic infusion initiated through an 12 catheter in the above-knee popliteal artery segment to treat suspected microembolic occlusions superimposed upon severe tibial occlusive disease and   residual suspected adherent thrombus in the distal  popliteal artery and TP trunk.      Impression    IMPRESSION:  1.  Calcified mild focal left common iliac artery stenosis and moderate focal calcified stenosis left external iliac artery, not treated at this setting. Chronically occluded left internal iliac artery.  2.  LEFT LEG: Severe calcified ostial stenosis left PFA treated with 5 mm drug-coated balloon angioplasty. Acute occlusion of chronically and severely diseased left SFA with new occlusive diffuse dissection of the SFA and popliteal artery segment.   Successful stent reconstruction of the entire SFA and above-knee popliteal artery segment. In-line flow reestablished to the knee. Irregular dissection and/or thrombus in the distal popliteal artery, TP trunk and suspected microembolic occlusions with   chronically and severely diseased tibial vessels prompted thrombolytic infusion. Patient's foot remain severely ischemic below the ankle at this time. Plan for overnight thrombolytic infusion with follow-up angiographic assessment the following day.   Findings and plan discussed with patient's wife Kyung and Dr. Smiley Jay of vascular surgery.

## 2024-09-21 NOTE — ED NOTES
EMERGENCY DEPARTMENT SIGN OUT NOTE        ED COURSE AND MEDICAL DECISION MAKING  Patient was signed out to me by Dr Sandoval Tijerina at 11:00 PM.  12:28 AM Spoke with [facility] regarding patient imaging. New occlusion when compared to imaging from yesterday.  12:33 AM Vascular surgery resident, Dr. Hernandez, in ED. Discussed the current treatment plan.   12:40 AM rechecked on patient and vascular surgery resident there with patient, already discussing plan.  12:44 AM Spoke with Dr. Kumar, Hospitalist, who agrees to accept the patient at this time.     In brief, Aubrey Duncan is a 69 year old male who initially presented for evaluation of left leg pain. Patient had an angiogram today. Now reporting left leg pain and an inability to stand. No recent falls or injuries. Patient currently taking aspirin and plavix.     Per the patient's wife, the patient received dilaudid but vomited the pill back up. Hx lung transplant 2013. Hx right lower leg amputation.      At time of sign out, disposition was pending CT of the leg and discussed with vascular surgery.  Patient awaiting CTA with runoff.  CTA did show left segmental and subsegmental PE as well as occlusion of the left anterior tibial artery.  Patient already being spoken to by vascular surgery resident who had discussed patient with Dr. Dale from vascular surgery.  Patient will be plan for admission and they will keep patient on the anticoagulation drip with plan for possible IR versus vascular procedure in the morning.  Plan being discussed with patient and vascular surgery resident.  Patient discussed with Dr. Kumar from hospitalist service for admission.    FINAL IMPRESSION    1. Pain of left lower leg    2. Ulcer of foot, chronic, left, with unspecified severity (H)    3. PAD (peripheral artery disease) (H24)    4. Arterial occlusion, lower extremity (H24)        ED MEDS  Medications   HYDROmorphone (DILAUDID) injection 0.5 mg (0.5 mg Intravenous $Given 9/20/24  9003)   argatroban (ACOVA) 1 mg/mL ANTICOAGULANT infusion (0.5 mcg/kg/min × 61.2 kg Intravenous Rate/Dose Change 9/21/24 0039)   HYDROmorphone (DILAUDID) injection 0.5 mg (0.5 mg Intravenous $Given 9/20/24 2123)   iopamidol (ISOVUE-370) solution 90 mL (90 mLs Intravenous $Given 9/20/24 2151)       LAB  Labs Ordered and Resulted from Time of ED Arrival to Time of ED Departure   BASIC METABOLIC PANEL - Abnormal       Result Value    Sodium 142      Potassium 4.0      Chloride 107      Carbon Dioxide (CO2) 25      Anion Gap 10      Urea Nitrogen 26.6 (*)     Creatinine 0.89      GFR Estimate >90      Calcium 7.5 (*)     Glucose 113 (*)    CBC WITH PLATELETS AND DIFFERENTIAL - Abnormal    WBC Count 8.1      RBC Count 2.98 (*)     Hemoglobin 9.4 (*)     Hematocrit 30.5 (*)      (*)     MCH 31.5      MCHC 30.8 (*)     RDW 16.8 (*)     Platelet Count 213      NRBCs per 100 WBC 0      Absolute NRBCs 0.0     HEPATIC FUNCTION PANEL - Abnormal    Protein Total 4.8 (*)     Albumin 2.9 (*)     Bilirubin Total 0.4      Alkaline Phosphatase 117      AST 61 (*)     ALT 38      Bilirubin Direct <0.20     MANUAL DIFFERENTIAL - Abnormal    % Neutrophils 55      % Lymphocytes 29      % Monocytes 11      % Eosinophils 4      % Basophils 1      Absolute Neutrophils 4.5      Absolute Lymphocytes 2.3      Absolute Monocytes 0.9      Absolute Eosinophils 0.3      Absolute Basophils 0.1      RBC Morphology Confirmed RBC Indices      Platelet Assessment        Value: Automated Count Confirmed. Platelet morphology is normal.    Reactive Lymphocytes Present (*)    PARTIAL THROMBOPLASTIN TIME - Abnormal    aPTT 94 (*)    PARTIAL THROMBOPLASTIN TIME - Normal    aPTT 29     PARTIAL THROMBOPLASTIN TIME     RADIOLOGY    CTA Abdomen Pelvis Runoff w Contrast   Final Result   Abnormal   IMPRESSION:   1.  Acute segmental and subsegmental pulmonary emboli in the left lower lobe.      2.  Right lower extremity: Extensive atherosclerotic  calcifications. The common iliac, external iliac, internal iliac, and common femoral arteries are patent. The deep femoral artery is occluded. The superficial femoral artery is patent. The popliteal    artery is occluded. The proximal anterior tibial artery is occluded. The tibioperoneal trunk is occluded. There is reconstitution of the posterior tibial and peroneal arteries via collaterals. Below the knee amputation.      3.   Left lower extremity: The left common iliac and left external iliac arteries are patent. The left internal iliac artery is occluded with distal reconstitution. The left common femoral artery is patent. The left deep femoral artery is patent. The    left superficial femoral artery is occluded. There is reconstitution of the popliteal artery via collateral flow. The distal popliteal artery is occluded, new from recent angiogram. Evaluation of the anterior tibial, posterior tibial, and peroneal    arteries is limited due to extensive atherosclerotic calcifications. The tibioperoneal trunk is likely occluded. The anterior tibial artery appears occluded, new from recent angiogram. The peroneal artery is occluded proximally, new from recent    angiogram, with distal reconstitution, with flow to the level of the foot. The proximal posterior tibial artery is occluded with distal reconstitution, with flow to the level of the foot.      4.  Since April 2024, slight increase in size of a large cystic mass in the pancreas.         [Critical Result: New diagnosis of pulmonary embolism]      Finding was identified on 9/21/2024 12:10 AM CDT.       Dr. Venegas was contacted by me on 9/21/2024 12:28 AM CDT and verbalized understanding of the critical result.              DISCHARGE MEDS  New Prescriptions    No medications on file       Darwin Venegas MD  [facility] EMERGENCY DEPARTMENT  [facility] [facility] [id number] [phone]         Darwin Venegas  MD Edis  09/21/24 0108

## 2024-09-22 ENCOUNTER — APPOINTMENT (OUTPATIENT)
Dept: INTERVENTIONAL RADIOLOGY/VASCULAR | Facility: HOSPITAL | Age: 69
DRG: 239 | End: 2024-09-22
Attending: RADIOLOGY
Payer: MEDICARE

## 2024-09-22 LAB
APTT PPP: 46 SECONDS (ref 22–38)
APTT PPP: 47 SECONDS (ref 22–38)
APTT PPP: 83 SECONDS (ref 22–38)
APTT PPP: 91 SECONDS (ref 22–38)
APTT PPP: 96 SECONDS (ref 22–38)
ERYTHROCYTE [DISTWIDTH] IN BLOOD BY AUTOMATED COUNT: 16.7 % (ref 10–15)
ERYTHROCYTE [DISTWIDTH] IN BLOOD BY AUTOMATED COUNT: 16.8 % (ref 10–15)
ERYTHROCYTE [DISTWIDTH] IN BLOOD BY AUTOMATED COUNT: 16.9 % (ref 10–15)
FIBRINOGEN PPP-MCNC: 300 MG/DL (ref 170–510)
FIBRINOGEN PPP-MCNC: 301 MG/DL (ref 170–510)
FIBRINOGEN PPP-MCNC: 349 MG/DL (ref 170–510)
GLUCOSE BLDC GLUCOMTR-MCNC: 108 MG/DL (ref 70–99)
GLUCOSE BLDC GLUCOMTR-MCNC: 109 MG/DL (ref 70–99)
GLUCOSE BLDC GLUCOMTR-MCNC: 114 MG/DL (ref 70–99)
GLUCOSE BLDC GLUCOMTR-MCNC: 118 MG/DL (ref 70–99)
GLUCOSE BLDC GLUCOMTR-MCNC: 152 MG/DL (ref 70–99)
GLUCOSE BLDC GLUCOMTR-MCNC: 152 MG/DL (ref 70–99)
HCT VFR BLD AUTO: 24.8 % (ref 40–53)
HCT VFR BLD AUTO: 25.1 % (ref 40–53)
HCT VFR BLD AUTO: 25.2 % (ref 40–53)
HGB BLD-MCNC: 7.5 G/DL (ref 13.3–17.7)
HGB BLD-MCNC: 7.7 G/DL (ref 13.3–17.7)
HGB BLD-MCNC: 7.9 G/DL (ref 13.3–17.7)
INR PPP: 1.54 (ref 0.85–1.15)
INR PPP: 1.56 (ref 0.85–1.15)
INR PPP: 2.5 (ref 0.85–1.15)
MCH RBC QN AUTO: 31.5 PG (ref 26.5–33)
MCH RBC QN AUTO: 31.7 PG (ref 26.5–33)
MCH RBC QN AUTO: 31.9 PG (ref 26.5–33)
MCHC RBC AUTO-ENTMCNC: 29.9 G/DL (ref 31.5–36.5)
MCHC RBC AUTO-ENTMCNC: 31 G/DL (ref 31.5–36.5)
MCHC RBC AUTO-ENTMCNC: 31.3 G/DL (ref 31.5–36.5)
MCV RBC AUTO: 102 FL (ref 78–100)
MCV RBC AUTO: 102 FL (ref 78–100)
MCV RBC AUTO: 106 FL (ref 78–100)
PLATELET # BLD AUTO: 124 10E3/UL (ref 150–450)
PLATELET # BLD AUTO: 128 10E3/UL (ref 150–450)
PLATELET # BLD AUTO: 136 10E3/UL (ref 150–450)
RBC # BLD AUTO: 2.38 10E6/UL (ref 4.4–5.9)
RBC # BLD AUTO: 2.43 10E6/UL (ref 4.4–5.9)
RBC # BLD AUTO: 2.48 10E6/UL (ref 4.4–5.9)
WBC # BLD AUTO: 9.1 10E3/UL (ref 4–11)
WBC # BLD AUTO: 9.2 10E3/UL (ref 4–11)
WBC # BLD AUTO: 9.2 10E3/UL (ref 4–11)

## 2024-09-22 PROCEDURE — 272N000566 HC SHEATH CR3

## 2024-09-22 PROCEDURE — 99233 SBSQ HOSP IP/OBS HIGH 50: CPT | Performed by: INTERNAL MEDICINE

## 2024-09-22 PROCEDURE — 85730 THROMBOPLASTIN TIME PARTIAL: CPT

## 2024-09-22 PROCEDURE — 85384 FIBRINOGEN ACTIVITY: CPT

## 2024-09-22 PROCEDURE — 250N000013 HC RX MED GY IP 250 OP 250 PS 637

## 2024-09-22 PROCEDURE — 255N000002 HC RX 255 OP 636: Performed by: RADIOLOGY

## 2024-09-22 PROCEDURE — 250N000011 HC RX IP 250 OP 636: Performed by: RADIOLOGY

## 2024-09-22 PROCEDURE — 250N000013 HC RX MED GY IP 250 OP 250 PS 637: Performed by: INTERNAL MEDICINE

## 2024-09-22 PROCEDURE — 36415 COLL VENOUS BLD VENIPUNCTURE: CPT | Performed by: INTERNAL MEDICINE

## 2024-09-22 PROCEDURE — 85027 COMPLETE CBC AUTOMATED: CPT

## 2024-09-22 PROCEDURE — 3E05317 INTRODUCTION OF OTHER THROMBOLYTIC INTO PERIPHERAL ARTERY, PERCUTANEOUS APPROACH: ICD-10-PCS | Performed by: RADIOLOGY

## 2024-09-22 PROCEDURE — 250N000011 HC RX IP 250 OP 636: Performed by: INTERNAL MEDICINE

## 2024-09-22 PROCEDURE — C1769 GUIDE WIRE: HCPCS

## 2024-09-22 PROCEDURE — 258N000003 HC RX IP 258 OP 636: Performed by: INTERNAL MEDICINE

## 2024-09-22 PROCEDURE — 85730 THROMBOPLASTIN TIME PARTIAL: CPT | Performed by: INTERNAL MEDICINE

## 2024-09-22 PROCEDURE — 85610 PROTHROMBIN TIME: CPT

## 2024-09-22 PROCEDURE — 250N000012 HC RX MED GY IP 250 OP 636 PS 637: Performed by: INTERNAL MEDICINE

## 2024-09-22 PROCEDURE — 250N000011 HC RX IP 250 OP 636: Performed by: FAMILY MEDICINE

## 2024-09-22 PROCEDURE — 250N000009 HC RX 250: Performed by: RADIOLOGY

## 2024-09-22 PROCEDURE — 99152 MOD SED SAME PHYS/QHP 5/>YRS: CPT

## 2024-09-22 PROCEDURE — 36415 COLL VENOUS BLD VENIPUNCTURE: CPT

## 2024-09-22 PROCEDURE — 200N000001 HC R&B ICU

## 2024-09-22 RX ORDER — NALOXONE HYDROCHLORIDE 0.4 MG/ML
0.4 INJECTION, SOLUTION INTRAMUSCULAR; INTRAVENOUS; SUBCUTANEOUS
Status: DISCONTINUED | OUTPATIENT
Start: 2024-09-22 | End: 2024-09-22 | Stop reason: HOSPADM

## 2024-09-22 RX ORDER — ONDANSETRON 2 MG/ML
4 INJECTION INTRAMUSCULAR; INTRAVENOUS
Status: DISCONTINUED | OUTPATIENT
Start: 2024-09-22 | End: 2024-09-22 | Stop reason: HOSPADM

## 2024-09-22 RX ORDER — NALOXONE HYDROCHLORIDE 0.4 MG/ML
0.2 INJECTION, SOLUTION INTRAMUSCULAR; INTRAVENOUS; SUBCUTANEOUS
Status: DISCONTINUED | OUTPATIENT
Start: 2024-09-22 | End: 2024-09-22 | Stop reason: HOSPADM

## 2024-09-22 RX ORDER — FLUMAZENIL 0.1 MG/ML
0.2 INJECTION, SOLUTION INTRAVENOUS
Status: DISCONTINUED | OUTPATIENT
Start: 2024-09-22 | End: 2024-09-22 | Stop reason: HOSPADM

## 2024-09-22 RX ORDER — HYDRALAZINE HYDROCHLORIDE 20 MG/ML
10 INJECTION INTRAMUSCULAR; INTRAVENOUS EVERY 6 HOURS PRN
Status: DISCONTINUED | OUTPATIENT
Start: 2024-09-22 | End: 2024-10-01 | Stop reason: HOSPADM

## 2024-09-22 RX ORDER — FENTANYL CITRATE 50 UG/ML
25-50 INJECTION, SOLUTION INTRAMUSCULAR; INTRAVENOUS EVERY 5 MIN PRN
Status: DISCONTINUED | OUTPATIENT
Start: 2024-09-22 | End: 2024-09-22 | Stop reason: HOSPADM

## 2024-09-22 RX ORDER — IODIXANOL 320 MG/ML
100 INJECTION, SOLUTION INTRAVASCULAR ONCE
Status: COMPLETED | OUTPATIENT
Start: 2024-09-22 | End: 2024-09-22

## 2024-09-22 RX ORDER — DEXTROSE MONOHYDRATE 25 G/50ML
25-50 INJECTION, SOLUTION INTRAVENOUS
Status: DISCONTINUED | OUTPATIENT
Start: 2024-09-22 | End: 2024-10-01 | Stop reason: HOSPADM

## 2024-09-22 RX ORDER — NICOTINE POLACRILEX 4 MG
15-30 LOZENGE BUCCAL
Status: DISCONTINUED | OUTPATIENT
Start: 2024-09-22 | End: 2024-10-01 | Stop reason: HOSPADM

## 2024-09-22 RX ADMIN — ACYCLOVIR 400 MG: 400 TABLET ORAL at 08:46

## 2024-09-22 RX ADMIN — Medication 2 CAPSULE: at 08:48

## 2024-09-22 RX ADMIN — FENTANYL CITRATE 25 MCG: 50 INJECTION, SOLUTION INTRAMUSCULAR; INTRAVENOUS at 09:57

## 2024-09-22 RX ADMIN — INSULIN ASPART 1 UNITS: 100 INJECTION, SOLUTION INTRAVENOUS; SUBCUTANEOUS at 15:40

## 2024-09-22 RX ADMIN — MONTELUKAST 10 MG: 10 TABLET, FILM COATED ORAL at 19:47

## 2024-09-22 RX ADMIN — FLUTICASONE FUROATE AND VILANTEROL TRIFENATATE 1 PUFF: 200; 25 POWDER RESPIRATORY (INHALATION) at 08:46

## 2024-09-22 RX ADMIN — HYDROMORPHONE HYDROCHLORIDE 0.5 MG: 0.2 INJECTION, SOLUTION INTRAMUSCULAR; INTRAVENOUS; SUBCUTANEOUS at 00:35

## 2024-09-22 RX ADMIN — PREDNISONE 5 MG: 5 TABLET ORAL at 08:48

## 2024-09-22 RX ADMIN — ONDANSETRON 4 MG: 4 TABLET, ORALLY DISINTEGRATING ORAL at 13:49

## 2024-09-22 RX ADMIN — DAPSONE 50 MG: 25 TABLET ORAL at 08:47

## 2024-09-22 RX ADMIN — SODIUM CHLORIDE 2000 ML: 9 INJECTION, SOLUTION INTRAVENOUS at 10:08

## 2024-09-22 RX ADMIN — FLUDROCORTISONE ACETATE 0.1 MG: 0.1 TABLET ORAL at 08:47

## 2024-09-22 RX ADMIN — HYDROMORPHONE HYDROCHLORIDE 0.5 MG: 0.2 INJECTION, SOLUTION INTRAMUSCULAR; INTRAVENOUS; SUBCUTANEOUS at 19:45

## 2024-09-22 RX ADMIN — ONDANSETRON 4 MG: 2 INJECTION INTRAMUSCULAR; INTRAVENOUS at 08:21

## 2024-09-22 RX ADMIN — HYDROMORPHONE HYDROCHLORIDE 0.5 MG: 0.2 INJECTION, SOLUTION INTRAMUSCULAR; INTRAVENOUS; SUBCUTANEOUS at 04:20

## 2024-09-22 RX ADMIN — DOCUSATE SODIUM 100 MG: 100 CAPSULE, LIQUID FILLED ORAL at 08:47

## 2024-09-22 RX ADMIN — FENTANYL CITRATE 25 MCG: 50 INJECTION, SOLUTION INTRAMUSCULAR; INTRAVENOUS at 10:10

## 2024-09-22 RX ADMIN — ASPIRIN 81 MG: 81 TABLET, COATED ORAL at 08:48

## 2024-09-22 RX ADMIN — INSULIN GLARGINE 5 UNITS: 100 INJECTION, SOLUTION SUBCUTANEOUS at 08:23

## 2024-09-22 RX ADMIN — Medication 2 CAPSULE: at 19:47

## 2024-09-22 RX ADMIN — MIDAZOLAM HYDROCHLORIDE 0.5 MG: 1 INJECTION, SOLUTION INTRAMUSCULAR; INTRAVENOUS at 09:53

## 2024-09-22 RX ADMIN — MIDAZOLAM HYDROCHLORIDE 0.5 MG: 1 INJECTION, SOLUTION INTRAMUSCULAR; INTRAVENOUS at 10:12

## 2024-09-22 RX ADMIN — CARVEDILOL 6.25 MG: 3.12 TABLET, FILM COATED ORAL at 08:47

## 2024-09-22 RX ADMIN — PREDNISONE 2.5 MG: 2.5 TABLET ORAL at 19:47

## 2024-09-22 RX ADMIN — VENLAFAXINE HYDROCHLORIDE 37.5 MG: 37.5 CAPSULE, EXTENDED RELEASE ORAL at 08:48

## 2024-09-22 RX ADMIN — PANTOPRAZOLE SODIUM 40 MG: 40 TABLET, DELAYED RELEASE ORAL at 08:48

## 2024-09-22 RX ADMIN — TACROLIMUS 3 MG: 1 CAPSULE ORAL at 08:47

## 2024-09-22 RX ADMIN — TACROLIMUS 3 MG: 1 CAPSULE ORAL at 19:47

## 2024-09-22 RX ADMIN — SODIUM CHLORIDE: 9 INJECTION, SOLUTION INTRAVENOUS at 00:21

## 2024-09-22 RX ADMIN — MIDAZOLAM HYDROCHLORIDE 0.5 MG: 1 INJECTION, SOLUTION INTRAMUSCULAR; INTRAVENOUS at 10:11

## 2024-09-22 RX ADMIN — IODIXANOL 30 ML: 320 INJECTION, SOLUTION INTRAVASCULAR at 10:30

## 2024-09-22 RX ADMIN — ACYCLOVIR 400 MG: 400 TABLET ORAL at 19:46

## 2024-09-22 RX ADMIN — CARVEDILOL 6.25 MG: 3.12 TABLET, FILM COATED ORAL at 18:38

## 2024-09-22 ASSESSMENT — ACTIVITIES OF DAILY LIVING (ADL)
ADLS_ACUITY_SCORE: 50

## 2024-09-22 NOTE — IR NOTE
Interventional Radiology Intra-procedural Nursing Note  Patient Name: Aubrey Duncan  Medical Record Number: [id number]  Today's Date: September 22, 2024    Procedure: left lower extremity angiogram    Sedation time: less than 15 minutes    Administered medication totals:  Versed 1.5 mg IVP  Fentanyl 50 mcg IVP    Last dose of sedation administered at 1012.   Procedure well tolerated by patient without complications. Procedure end with debrief by Dr. Dewey.  Report given to Annette Akbar RN, sit checked, reviewed plan of care, no questions at the end of report.     AVS Discharge Instructions reviewed with and provided to patient and/or responsible adult. All questions and concerns addressed and understanding was verbalized.    Note: Patient entered Interventional Radiology Suite number 1 via cart. Patient awake, alert and orientated. Assisted onto procedural table in supine position. Prepped and draped.  Dr. Dewey in room. Time out and procedure started. Vital signs stable. Telemetry reading NSR.

## 2024-09-22 NOTE — PLAN OF CARE
Problem: Pain Acute  Goal: Optimal Pain Control and Function  Outcome: Progressing  Intervention: Develop Pain Management Plan  Recent Flowsheet Documentation  Taken 9/22/2024 0035 by Ivy Cristobal RN  Pain Management Interventions: medication (see MAR)  Intervention: Prevent or Manage Pain  Recent Flowsheet Documentation  Taken 9/22/2024 0000 by Ivy Cristobal RN  Sensory Stimulation Regulation:   care clustered   lighting decreased  Sleep/Rest Enhancement:   awakenings minimized   comfort measures   noise level reduced   room darkened  Medication Review/Management: medications reviewed  Intervention: Optimize Psychosocial Wellbeing  Recent Flowsheet Documentation  Taken 9/22/2024 0000 by Ivy Cristobal RN  Supportive Measures:   active listening utilized   decision-making supported  [facility] - ICU    RN Progress Note:            Pertinent Assessments:      Please refer to flowsheet rows for full assessment     Left foot remains cool to touch, no pulses dopplerable. Popliteal pulse found with doppler.       Diluadid effective for pain.      Key Events - This Shift:   Right groin sheath oozing moderate amt. Pressure applied to site x 10 min x2.   Patient removed tegaderm dressing while using urinal, sheath and catheter   Undisturbed. Replaced tegaderm.              Barriers to Discharge / Downgrade:     Lytics, sheath in place.

## 2024-09-22 NOTE — PROCEDURES
[facility]    Procedure: IR Procedure Note    Date/Time: 9/22/2024 10:20 AM    Performed by: Eduardo Dewey MD  Authorized by: Eduardo Dewey MD  IR Fellow Physician:    Pre Procedure Diagnosis: LLE angiogram, CLI  Post Procedure Diagnosis: same    UNIVERSAL PROTOCOL   Site Marked: NA  Prior Images Obtained and Reviewed:  Yes  Required items: Required blood products, implants, devices and special equipment available    Patient identity confirmed:  Verbally with patient, arm band, provided demographic data and hospital-assigned identification number  Patient was reevaluated immediately before administering moderate or deep sedation or anesthesia  Confirmation Checklist:  Patient's identity using two indicators, relevant allergies, procedure was appropriate and matched the consent or emergent situation and correct equipment/implants were available  Time out: Immediately prior to the procedure a time out was called    Universal Protocol: the Joint Commission Universal Protocol was followed    Preparation: Patient was prepped and draped in usual sterile fashion      SEDATION  Patient Sedated: Yes    Vital signs: Vital signs monitored during sedation    Findings: Patent inline flow to knee with improved flow to ankle via severely disease trifurcation vessels. Warm to ankle, mottled and cold distal foot with decreased motor function. Closure device right CFA access. Lytics discontinued.    Specimens: none    Procedural Complications: None    Condition: Stable    Plan: May anticoagulate 4 hours post sheath removal if no bleeding complications and if desired. Discuss plan with vascular surgery to determine if pt can eat.      PROCEDURE  Describe Procedure: Patent inline flow to knee with improved flow to ankle via severely disease trifurcation vessels. Warm to ankle, mottled and cold distal foot with decreased motor function. Closure device right CFA access. Lytics discontinued.  Length of  time physician/provider present for 1:1 monitoring during sedation:  0-22 min

## 2024-09-22 NOTE — PROGRESS NOTES
Vascular Surgery Progress Note    Patient seen post-angiogram and lytics catheter removal. Sleeping comfortably. Pain with palpation of calf and foot, but rest pain otherwise improved. Ankle and heel now warm.    On exam, still no pedal signals but biphasic popliteal signal. Motor exam limited due to pain but plantarflexion and dorsiflexion intact. Sensory exam unchanged. Groin soft with no hematoma.    - Regular diet, NPO at midnight for possible amputation tomorrow pending surgeon/OR availability  - Can continue argatroban gtt. Will pause prior to surgery    Tanner Hernandez MD  Vascular Surgery Resident

## 2024-09-22 NOTE — PROGRESS NOTES
Vascular Surgery Progress Note  09/22/2024       Subjective:  - Patient states he was able to sleep overnight between RN elana. Pain not worse compared to yesterday.     Objective:  Temp:  [98.1  F (36.7  C)-100  F (37.8  C)] 99.2  F (37.3  C)  Pulse:  [] 96  Resp:  [9-43] 13  BP: ()/(50-80) 173/77  SpO2:  [89 %-100 %] 94 %    I/O last 3 completed shifts:  In: 1948.26 [P.O.:470; I.V.:1478.26]  Out: 700 [Urine:700]      Gen: Awake, alert, NAD  Resp: NLB on RA  LLExt: Monophasic popliteal artery, palpable femoral pulse. Cool foot, ankle now warm compared to prior exam. No pedal signals. Motor of ankle and sensation of foot intact. Forefoot mottled. Some bruising on R shin  R groin with dressing soaked in blood (per RN, this was placed recently)       Labs:  Recent Labs   Lab 09/22/24  0621 09/22/24  0026 09/21/24  1752   WBC 9.2 9.2 10.1   HGB 7.7* 7.9* 8.5*   * 136* 156       Recent Labs   Lab 09/22/24  0743 09/22/24  0414 09/22/24  0029 09/21/24  0215 09/20/24 2024 09/20/24  1032 09/20/24  0657   NA  --   --   --   --  142  --  139   POTASSIUM  --   --   --   --  4.0  --  4.0   CHLORIDE  --   --   --   --  107  --  102   CO2  --   --   --   --  25  --  25   BUN  --   --   --   --  26.6*  --  28.5*   CR  --   --   --   --  0.89  --  0.83   * 114* 118*   < > 113*   < > 80   ERIN  --   --   --   --  7.5*  --  8.3*    < > = values in this interval not displayed.       Imaging:  IR Lower Extremity Angiogram Left   Final Result   IMPRESSION:   1.  Calcified mild focal left common iliac artery stenosis and moderate focal calcified stenosis left external iliac artery, not treated at this setting. Chronically occluded left internal iliac artery.   2.  LEFT LEG: Severe calcified ostial stenosis left PFA treated with 5 mm drug-coated balloon angioplasty. Acute occlusion of chronically and severely diseased left SFA with new occlusive diffuse dissection of the SFA and popliteal artery segment.     Successful stent reconstruction of the entire SFA and above-knee popliteal artery segment. In-line flow reestablished to the knee. Irregular dissection and/or thrombus in the distal popliteal artery, TP trunk and suspected microembolic occlusions with    chronically and severely diseased tibial vessels prompted thrombolytic infusion. Patient's foot remain severely ischemic below the ankle at this time. Plan for overnight thrombolytic infusion with follow-up angiographic assessment the following day.    Findings and plan discussed with patient's wife Kyung and Dr. Smiley Jay of vascular surgery.         CTA Abdomen Pelvis Runoff w Contrast   Final Result   Abnormal   IMPRESSION:   1.  Acute segmental and subsegmental pulmonary emboli in the left lower lobe.      2.  Right lower extremity: Extensive atherosclerotic calcifications. The common iliac, external iliac, internal iliac, and common femoral arteries are patent. The deep femoral artery is occluded. The superficial femoral artery is patent. The popliteal    artery is occluded. The proximal anterior tibial artery is occluded. The tibioperoneal trunk is occluded. There is reconstitution of the posterior tibial and peroneal arteries via collaterals. Below the knee amputation.      3.   Left lower extremity: The left common iliac and left external iliac arteries are patent. The left internal iliac artery is occluded with distal reconstitution. The left common femoral artery is patent. The left deep femoral artery is patent. The    left superficial femoral artery is occluded. There is reconstitution of the popliteal artery via collateral flow. The distal popliteal artery is occluded, new from recent angiogram. Evaluation of the anterior tibial, posterior tibial, and peroneal    arteries is limited due to extensive atherosclerotic calcifications. The tibioperoneal trunk is likely occluded. The anterior tibial artery appears occluded, new from recent angiogram. The  peroneal artery is occluded proximally, new from recent    angiogram, with distal reconstitution, with flow to the level of the foot. The proximal posterior tibial artery is occluded with distal reconstitution, with flow to the level of the foot.      4.  Since April 2024, slight increase in size of a large cystic mass in the pancreas.         [Critical Result: New diagnosis of pulmonary embolism]      Finding was identified on 9/21/2024 12:10 AM CDT.       Dr. Venegas was contacted by me on 9/21/2024 12:28 AM CDT and verbalized understanding of the critical result.          IR Angiogram through catheter (arterial)    (Results Pending)        Assessment/Plan:   69 year old male with new rest pain following LLE angiogram and AT PTA, now with distal popliteal and AT occlusions. Limited revascularization options. Underwent SFA/popliteal stenting and lytics catheter placement with IR on 9/21 with small amount of improvement on exam.    - Tenectaplase paused this morning due to bleeding from access site  - Repeat angio and lytics check this AM with IR, NPO until after procedure  - Ok for diet after procedure  - Will evaluate options following angiogram, though will likely need amputation at some level  - Restart argatroban gtt after removal of lytics catheter     Seen, examined, and discussed with staff.  - - - - - - - - - - - - - - - - - -  Tanner Hernandez MD  Vascular Surgery Resident

## 2024-09-22 NOTE — PLAN OF CARE
Goal Outcome Evaluation:      Plan of Care Reviewed With: patient, spouse    Overall Patient Progress: improvingOverall Patient Progress: improving    Outcome Evaluation: Lytic therapy initiated and pt in ICU    [facility] - ICU    RN Progress Note:            Pertinent Assessments:      Please refer to flowsheet rows for full assessment     LLL remains free from compartmental syndrome and remains warm to the foot, which is cool and mottled at toes. No pulses present in foot, positive in popliteal. Right groin is bleeding at sheath site, but is unchanged--no hematoma present. Left groin, access site from angiogram yesterday, remains bruised and unchanged as well Q 2 hour neuros, labs, and site checks remain uneventful.     Pt's painful in LLE--pt declining PO diluadid r/t prev issue of nausea after taking x2. Requesting IV dilaudid and this is helping with pain.            Key Events - This Shift:       Admit to ICU post lytic therapy initiation.                 Barriers to Discharge / Downgrade:     ICU level cares         Point of Contact Update: YES-OR-NO: Yes  If No, reason: na  Name: Kyung, wife  Summary of Conversation: pt condition post angiogram, lytic therapy initiation, ICU cares, and visiting hours

## 2024-09-23 ENCOUNTER — TELEPHONE (OUTPATIENT)
Dept: VASCULAR SURGERY | Facility: CLINIC | Age: 69
End: 2024-09-23
Payer: MEDICARE

## 2024-09-23 LAB
ANION GAP SERPL CALCULATED.3IONS-SCNC: 9 MMOL/L (ref 7–15)
APTT PPP: 78 SECONDS (ref 22–38)
APTT PPP: 81 SECONDS (ref 22–38)
BUN SERPL-MCNC: 28.1 MG/DL (ref 8–23)
CALCIUM SERPL-MCNC: 7.4 MG/DL (ref 8.8–10.4)
CHLORIDE SERPL-SCNC: 103 MMOL/L (ref 98–107)
CREAT SERPL-MCNC: 0.91 MG/DL (ref 0.67–1.17)
EGFRCR SERPLBLD CKD-EPI 2021: 90 ML/MIN/1.73M2
ERYTHROCYTE [DISTWIDTH] IN BLOOD BY AUTOMATED COUNT: 16.4 % (ref 10–15)
GLUCOSE BLDC GLUCOMTR-MCNC: 102 MG/DL (ref 70–99)
GLUCOSE BLDC GLUCOMTR-MCNC: 136 MG/DL (ref 70–99)
GLUCOSE BLDC GLUCOMTR-MCNC: 161 MG/DL (ref 70–99)
GLUCOSE BLDC GLUCOMTR-MCNC: 86 MG/DL (ref 70–99)
GLUCOSE SERPL-MCNC: 113 MG/DL (ref 70–99)
HCO3 SERPL-SCNC: 22 MMOL/L (ref 22–29)
HCT VFR BLD AUTO: 23.6 % (ref 40–53)
HGB BLD-MCNC: 7.4 G/DL (ref 13.3–17.7)
MCH RBC QN AUTO: 31.9 PG (ref 26.5–33)
MCHC RBC AUTO-ENTMCNC: 31.4 G/DL (ref 31.5–36.5)
MCV RBC AUTO: 102 FL (ref 78–100)
PLATELET # BLD AUTO: 124 10E3/UL (ref 150–450)
POTASSIUM SERPL-SCNC: 3.8 MMOL/L (ref 3.4–5.3)
RBC # BLD AUTO: 2.32 10E6/UL (ref 4.4–5.9)
SODIUM SERPL-SCNC: 134 MMOL/L (ref 135–145)
WBC # BLD AUTO: 8.2 10E3/UL (ref 4–11)

## 2024-09-23 PROCEDURE — 250N000013 HC RX MED GY IP 250 OP 250 PS 637: Performed by: INTERNAL MEDICINE

## 2024-09-23 PROCEDURE — 250N000011 HC RX IP 250 OP 636: Mod: JZ | Performed by: INTERNAL MEDICINE

## 2024-09-23 PROCEDURE — 85027 COMPLETE CBC AUTOMATED: CPT | Performed by: INTERNAL MEDICINE

## 2024-09-23 PROCEDURE — 80048 BASIC METABOLIC PNL TOTAL CA: CPT | Performed by: INTERNAL MEDICINE

## 2024-09-23 PROCEDURE — 99222 1ST HOSP IP/OBS MODERATE 55: CPT | Performed by: PHYSICIAN ASSISTANT

## 2024-09-23 PROCEDURE — 250N000012 HC RX MED GY IP 250 OP 636 PS 637: Performed by: INTERNAL MEDICINE

## 2024-09-23 PROCEDURE — 85730 THROMBOPLASTIN TIME PARTIAL: CPT | Performed by: INTERNAL MEDICINE

## 2024-09-23 PROCEDURE — 250N000011 HC RX IP 250 OP 636: Performed by: FAMILY MEDICINE

## 2024-09-23 PROCEDURE — 200N000001 HC R&B ICU

## 2024-09-23 PROCEDURE — 250N000011 HC RX IP 250 OP 636: Performed by: PHYSICIAN ASSISTANT

## 2024-09-23 PROCEDURE — 99233 SBSQ HOSP IP/OBS HIGH 50: CPT | Performed by: INTERNAL MEDICINE

## 2024-09-23 PROCEDURE — 36415 COLL VENOUS BLD VENIPUNCTURE: CPT | Performed by: INTERNAL MEDICINE

## 2024-09-23 RX ORDER — HYDROMORPHONE HYDROCHLORIDE 1 MG/ML
0.5 INJECTION, SOLUTION INTRAMUSCULAR; INTRAVENOUS; SUBCUTANEOUS
Status: DISCONTINUED | OUTPATIENT
Start: 2024-09-23 | End: 2024-09-25

## 2024-09-23 RX ORDER — TRAMADOL HYDROCHLORIDE 50 MG/1
50 TABLET ORAL EVERY 6 HOURS PRN
Status: DISCONTINUED | OUTPATIENT
Start: 2024-09-23 | End: 2024-09-30

## 2024-09-23 RX ADMIN — PREDNISONE 5 MG: 5 TABLET ORAL at 08:15

## 2024-09-23 RX ADMIN — TACROLIMUS 3 MG: 1 CAPSULE ORAL at 20:49

## 2024-09-23 RX ADMIN — HYDROMORPHONE HYDROCHLORIDE 0.5 MG: 1 INJECTION, SOLUTION INTRAMUSCULAR; INTRAVENOUS; SUBCUTANEOUS at 20:49

## 2024-09-23 RX ADMIN — ASPIRIN 81 MG: 81 TABLET, COATED ORAL at 08:15

## 2024-09-23 RX ADMIN — MONTELUKAST 10 MG: 10 TABLET, FILM COATED ORAL at 20:49

## 2024-09-23 RX ADMIN — DAPSONE 50 MG: 25 TABLET ORAL at 08:32

## 2024-09-23 RX ADMIN — LOPERAMIDE HYDROCHLORIDE 2 MG: 2 CAPSULE ORAL at 13:49

## 2024-09-23 RX ADMIN — TACROLIMUS 3 MG: 1 CAPSULE ORAL at 08:31

## 2024-09-23 RX ADMIN — FLUTICASONE FUROATE AND VILANTEROL TRIFENATATE 1 PUFF: 200; 25 POWDER RESPIRATORY (INHALATION) at 11:30

## 2024-09-23 RX ADMIN — ROSUVASTATIN 20 MG: 10 TABLET, FILM COATED ORAL at 08:15

## 2024-09-23 RX ADMIN — ACYCLOVIR 400 MG: 400 TABLET ORAL at 08:32

## 2024-09-23 RX ADMIN — Medication 2 CAPSULE: at 08:31

## 2024-09-23 RX ADMIN — PANTOPRAZOLE SODIUM 40 MG: 40 TABLET, DELAYED RELEASE ORAL at 08:15

## 2024-09-23 RX ADMIN — INSULIN GLARGINE 5 UNITS: 100 INJECTION, SOLUTION SUBCUTANEOUS at 06:46

## 2024-09-23 RX ADMIN — VENLAFAXINE HYDROCHLORIDE 37.5 MG: 37.5 CAPSULE, EXTENDED RELEASE ORAL at 08:31

## 2024-09-23 RX ADMIN — ACYCLOVIR 400 MG: 400 TABLET ORAL at 20:49

## 2024-09-23 RX ADMIN — CARVEDILOL 6.25 MG: 3.12 TABLET, FILM COATED ORAL at 08:15

## 2024-09-23 RX ADMIN — HYDROMORPHONE HYDROCHLORIDE 0.5 MG: 0.2 INJECTION, SOLUTION INTRAMUSCULAR; INTRAVENOUS; SUBCUTANEOUS at 08:14

## 2024-09-23 RX ADMIN — CARVEDILOL 6.25 MG: 3.12 TABLET, FILM COATED ORAL at 18:11

## 2024-09-23 RX ADMIN — ARGATROBAN 0.25 MCG/KG/MIN: 50 INJECTION, SOLUTION INTRAVENOUS at 08:25

## 2024-09-23 RX ADMIN — Medication 2 CAPSULE: at 20:49

## 2024-09-23 RX ADMIN — AZITHROMYCIN DIHYDRATE 250 MG: 250 TABLET, FILM COATED ORAL at 08:32

## 2024-09-23 RX ADMIN — ACETAMINOPHEN 650 MG: 325 TABLET ORAL at 21:20

## 2024-09-23 RX ADMIN — FLUDROCORTISONE ACETATE 0.1 MG: 0.1 TABLET ORAL at 08:32

## 2024-09-23 RX ADMIN — HYDROMORPHONE HYDROCHLORIDE 0.5 MG: 1 INJECTION, SOLUTION INTRAMUSCULAR; INTRAVENOUS; SUBCUTANEOUS at 11:34

## 2024-09-23 RX ADMIN — HYDROMORPHONE HYDROCHLORIDE 0.5 MG: 0.2 INJECTION, SOLUTION INTRAMUSCULAR; INTRAVENOUS; SUBCUTANEOUS at 04:53

## 2024-09-23 RX ADMIN — PREDNISONE 2.5 MG: 2.5 TABLET ORAL at 20:49

## 2024-09-23 RX ADMIN — HYDROMORPHONE HYDROCHLORIDE 0.5 MG: 1 INJECTION, SOLUTION INTRAMUSCULAR; INTRAVENOUS; SUBCUTANEOUS at 16:47

## 2024-09-23 ASSESSMENT — ACTIVITIES OF DAILY LIVING (ADL)
ADLS_ACUITY_SCORE: 50
ADLS_ACUITY_SCORE: 46
ADLS_ACUITY_SCORE: 50
ADLS_ACUITY_SCORE: 46
ADLS_ACUITY_SCORE: 47
ADLS_ACUITY_SCORE: 50
ADLS_ACUITY_SCORE: 46
ADLS_ACUITY_SCORE: 47
ADLS_ACUITY_SCORE: 50
ADLS_ACUITY_SCORE: 46
ADLS_ACUITY_SCORE: 50
ADLS_ACUITY_SCORE: 50
ADLS_ACUITY_SCORE: 46
ADLS_ACUITY_SCORE: 46
ADLS_ACUITY_SCORE: 47
ADLS_ACUITY_SCORE: 46
ADLS_ACUITY_SCORE: 50
ADLS_ACUITY_SCORE: 47

## 2024-09-23 NOTE — CONSULTS
[facility] ACUTE INPATIENT PAIN SERVICE    [facility], [facility], [facility], [facility], [place]   PAIN CONSULT      Assessment/Plan:  Aubrey Duncan is a 69 year old male who was admitted on 9/20/2024.  I was asked by Vinnie Nelson MD. Past medical history of  alpha-1 antitrypsin deficiency, prior HITS, type 2 diabetes, bilateral chronic limb-threatening ischemia status post recent right BKA, hypertension and GERD.    Admitted on 9/20/2024 due to worsening left foot and leg pain and was found to have new left acute distal popliteal artery and anterior tibial artery occlusion.      CTA revealed extensive occlusion of both deep and superficial arteries in the left lower extremity. Vascular surgery service performed balloon angioplasty of the left anterior tibial artery on 9/21/2024.  Notable operative findings include moderate stenosis of the distal left common femoral artery, new long-segment occlusion of the left superficial femoral artery, reconstitution of the left popliteal artery, and occluded left posterior tibial artery.       IR service was consulted by vascular surgery service for revascularization. Successful recanalization and reconstruction with stents was performed by IR on 9/21/2024     shows prescriptions of Hydromorphone and Gabapentin      Opioids used in the past 24h:  *(3) IV hydromorphone 0.5mg    He is not using any of his PO Dilaudid, reports SE of nausea. Reports SE of hallucinations with oxycodone in the past. Agreeable to trial Tramadol.      PLAN:  Acute on chronic left lower extremity pain.    Multimodal Medication Therapy:   Adjuvants:   - ASA 81mg daily  -  APAP 650mg q6h prn  - Topical lidocaine  Opioids:  - Discontinue Dilaudid 1mg q6h prn  - Trial Tramadol IR 25-50mg q6h prn  - IV Dilaudid 0.5mg q1h changed to q2h prn  Non-medication interventions- Ice  Constipation Prophylaxis- Senna-S  Follow up /Discharge Recommendations - We recommend prescribing the following at the time  of discharge:  TBD          Subjective:  Aubrey is seen today in his bed alert and oriented in no acute distress. Reports his pain is currently a 6/10 located in his left lower limb. Discussed PO medications for his breakthrough pain today. He states dilaudid makes him nauseous. SE of hallucinations with oxycodone in the past. Agreeable to rotate to Tramadol today.     Denies nausea, vomiting, constipation.      Reviewed continuing with current plan, all questions answered.            History   Drug Use No         Tobacco Use      Smoking status: Former        Packs/day: 0.00        Years: 2.0 packs/day for 15.0 years (30.0 ttl pk-yrs)        Types: Cigarettes        Start date: 1971        Quit date: 1986        Years since quittin.7        Passive exposure: Past      Smokeless tobacco: Never        Objective:  Vital signs in last 24 hours:  B/P: 117/56, T: 98, P: 92, R: 20   Blood pressure 117/56, pulse 92, temperature 98  F (36.7  C), temperature source Oral, resp. rate 20, weight 62.1 kg (136 lb 14.5 oz), SpO2 94%.        Review of Systems:   As per subjective, all others negative.    Physical Exam    General: in no apparent distress and non-toxic   HEENT: Head normocephalic atraumatic, oral mucosa moist. Sclerae anicteric  Resp: Non-labored breathing  GI: Normoactive bowel sounds  Skin: No rashes or lesions  Extremities: Diminished sensation of left foot. Left toe necrotic ulcers.  Psych: Normal affect, mood euthymic  Neuro: Grossly normal          Imaging:  Personally Reviewed.    Results for orders placed or performed during the hospital encounter of 24   CTA Abdomen Pelvis Runoff w Contrast   Result Value Ref Range    Radiologist flags New diagnosis of pulmonary embolism (AA)     Impression    IMPRESSION:  1.  Acute segmental and subsegmental pulmonary emboli in the left lower lobe.    2.  Right lower extremity: Extensive atherosclerotic calcifications. The common iliac, external iliac,  internal iliac, and common femoral arteries are patent. The deep femoral artery is occluded. The superficial femoral artery is patent. The popliteal   artery is occluded. The proximal anterior tibial artery is occluded. The tibioperoneal trunk is occluded. There is reconstitution of the posterior tibial and peroneal arteries via collaterals. Below the knee amputation.    3.   Left lower extremity: The left common iliac and left external iliac arteries are patent. The left internal iliac artery is occluded with distal reconstitution. The left common femoral artery is patent. The left deep femoral artery is patent. The   left superficial femoral artery is occluded. There is reconstitution of the popliteal artery via collateral flow. The distal popliteal artery is occluded, new from recent angiogram. Evaluation of the anterior tibial, posterior tibial, and peroneal   arteries is limited due to extensive atherosclerotic calcifications. The tibioperoneal trunk is likely occluded. The anterior tibial artery appears occluded, new from recent angiogram. The peroneal artery is occluded proximally, new from recent   angiogram, with distal reconstitution, with flow to the level of the foot. The proximal posterior tibial artery is occluded with distal reconstitution, with flow to the level of the foot.    4.  Since April 2024, slight increase in size of a large cystic mass in the pancreas.      [Critical Result: New diagnosis of pulmonary embolism]    Finding was identified on 9/21/2024 12:10 AM CDT.     Dr. Venegas was contacted by me on 9/21/2024 12:28 AM CDT and verbalized understanding of the critical result.      IR Lower Extremity Angiogram Left    Impression    IMPRESSION:  1.  Calcified mild focal left common iliac artery stenosis and moderate focal calcified stenosis left external iliac artery, not treated at this setting. Chronically occluded left internal iliac artery.  2.  LEFT LEG: Severe calcified ostial stenosis  left PFA treated with 5 mm drug-coated balloon angioplasty. Acute occlusion of chronically and severely diseased left SFA with new occlusive diffuse dissection of the SFA and popliteal artery segment.   Successful stent reconstruction of the entire SFA and above-knee popliteal artery segment. In-line flow reestablished to the knee. Irregular dissection and/or thrombus in the distal popliteal artery, TP trunk and suspected microembolic occlusions with   chronically and severely diseased tibial vessels prompted thrombolytic infusion. Patient's foot remain severely ischemic below the ankle at this time. Plan for overnight thrombolytic infusion with follow-up angiographic assessment the following day.   Findings and plan discussed with patient's wife Kyung and Dr. Smiley Jay of vascular surgery.     IR Angiogram through catheter (arterial)    Impression    IMPRESSION:    1.  Left leg: There is in-line flow to the knee. Chronic severe trifurcation occlusive disease, not salvageable by endovascular means. Improved collateral perfusion left calf since previous day. Effectively no flow beyond the proximal foot. Lytic   infusion discontinued. Vascular surgery aware of findings and plan.          Lab Results:  Personally Reviewed.   Last Comprehensive Metabolic Panel:  Sodium   Date Value Ref Range Status   09/23/2024 134 (L) 135 - 145 mmol/L Final   06/03/2021 140 134 - 144 mmol/L Final     Potassium   Date Value Ref Range Status   09/23/2024 3.8 3.4 - 5.3 mmol/L Final   10/26/2022 4.5 3.5 - 5.1 mmol/L Final   06/03/2021 4.6 3.5 - 5.1 mmol/L Final     Chloride   Date Value Ref Range Status   09/23/2024 103 98 - 107 mmol/L Final   06/03/2021 105 98 - 107 mmol/L Final     Chloride (External)   Date Value Ref Range Status   11/09/2022 110 (H) 98 - 107 mmol/L Final     Carbon Dioxide   Date Value Ref Range Status   06/03/2021 26 21 - 31 mmol/L Final     Carbon Dioxide (CO2)   Date Value Ref Range Status   09/23/2024 22 22 - 29  "mmol/L Final   10/26/2022 28 21 - 31 mmol/L Final     Anion Gap   Date Value Ref Range Status   09/23/2024 9 7 - 15 mmol/L Final   10/26/2022 6 3 - 14 mmol/L Final   06/03/2021 9 3 - 14 mmol/L Final     Glucose   Date Value Ref Range Status   09/23/2024 113 (H) 70 - 99 mg/dL Final   10/26/2022 92 70 - 105 mg/dL Final   06/03/2021 96 70 - 105 mg/dL Final     GLUCOSE BY METER POCT   Date Value Ref Range Status   09/23/2024 102 (H) 70 - 99 mg/dL Final     Urea Nitrogen   Date Value Ref Range Status   09/23/2024 28.1 (H) 8.0 - 23.0 mg/dL Final   10/26/2022 36 (H) 7 - 25 mg/dL Final   06/03/2021 38 (H) 7 - 25 mg/dL Final     Creatinine   Date Value Ref Range Status   09/23/2024 0.91 0.67 - 1.17 mg/dL Final   06/03/2021 1.29 0.70 - 1.30 mg/dL Final     GFR Estimate   Date Value Ref Range Status   09/23/2024 90 >60 mL/min/1.73m2 Final     Comment:     eGFR calculated using 2021 CKD-EPI equation.   06/03/2021 56 (L) >60 mL/min/[1.73_m2] Final     GFR, ESTIMATED POCT   Date Value Ref Range Status   07/21/2022 59 (L) >60 mL/min/1.73m2 Final     Calcium   Date Value Ref Range Status   09/23/2024 7.4 (L) 8.8 - 10.4 mg/dL Final     Comment:     Reference intervals for this test were updated on 7/16/2024 to reflect our healthy population more accurately. There may be differences in the flagging of prior results with similar values performed with this method. Those prior results can be interpreted in the context of the updated reference intervals.   06/03/2021 8.9 8.6 - 10.3 mg/dL Final        UA: No results found for: \"UAMP\", \"UBARB\", \"BENZODIAZEUR\", \"UCANN\", \"UCOC\", \"OPIT\", \"UPCP\"           Please see A&P for additional details of medical decision making.    Isma Irving PA-C  Acute Care Pain Management  Team  Hours of pain coverage Mon-Fri 8-1600, afterhours please call the house officer    [facility] ([place], [place], [place], [place])   Page via [employer] web console -Click for Vocera            "

## 2024-09-23 NOTE — TELEPHONE ENCOUNTER
Caller: Unknown    Provider: MD Grace Sneed    Detailed reason for call: Caller LM over the weekend stating patient's foot has been turning cold and quite painful since angio.    Best phone number to contact: [phone]

## 2024-09-23 NOTE — PROGRESS NOTES
[facility]    Medicine Progress Note - Hospitalist Service    Date of Admission:  9/20/2024    Assessment & Plan   Aubrey Duncan is a 69 year old male with history of double lung transplant due to alpha-1 antitrypsin deficiency, prior HITS, type 2 diabetes, bilateral chronic limb-threatening ischemia status post recent right BKA, hypertension and GERD admitted on 9/20/2024 due to worsening left foot and leg pain and was found to have new left acute distal popliteal artery and anterior tibial artery occlusion.      CTA revealed extensive occlusion of both deep and superficial arteries in the left lower extremity. Vascular surgery service performed balloon angioplasty of the left anterior tibial artery on 9/21/2024.  Notable operative findings include moderate stenosis of the distal left common femoral artery, new long-segment occlusion of the left superficial femoral artery, reconstitution of the left popliteal artery, and occluded left posterior tibial artery.      IR service was consulted by vascular surgery service for revascularization. Successful recanalization and reconstruction with stents was performed by IR on 9/21/2024 and patient was subsequently placed on tenecteplase. Interventional radiologist recommends tenecteplase overnight for left lower extremity with plan to hold argatroban and perform follow-up angiogram tomorrow.       9/22 :       Hb 7.5, monitor hb  Transfuse prn to keep hb > 7-8    post-angiogram and lytics catheter removal   Plan for amputation in am ?  On argatroban infusion for h/o HIT, acute b/l PE  Vascular surgery following    Not medically ready for discharge at this time.  Multi day stay at this time        9/23 :     On 1 liter of oxygen  Denies any significant sob    Hb 7.5--7.4, monitor hb  Transfuse prn to keep hb > 7-8  Platelets : 156--136--124    post-angiogram and lytics catheter removal   Plan for amputation on wednesday  On argatroban infusion for  h/o HIT, acute b/l PE  Vascular surgery following : continue DAPT for now (coronary stents, SFA stents), can likely change to antiplatelet monotherapy with aspirin OR plavix if the plan for anticoagulation at discharge due to acute PE, would recommend checking with cardiology     Not medically ready for discharge at this time.  Multi day stay at this time        A/p :       Bilateral critical limb ischemia  --Hold argatroban for now as patient is receiving tenecteplase  --Continue tenecteplase per IR recommendation  --Angiogram tomorrow per IR  --Vascular surgery [id number] assistance  --Continue aspirin  --Monitor for compartment syndrome      Acute bilateral PE  --Hold argatroban for now as patient is receiving tenecteplase  -- Resume argatroban when off tenecteplase  --Hold Plavix while on argatroban.  --Unable to afford Plavix since the co-pay was $5000.    Insulin-dependent diabetes  --Continue 5 units of Lantus and sliding scale insulin  --Currently n.p.o.    History of double lung transplant due to alpha-1 antitrypsin deficiency  --Continue antirejection medications  -- Continue inhalers    Recent right below-knee amputation due to PVD  --Continue aspirin Plavix    Essential hypertension  -Hold Lasix due to possible procedure    GERD  -- Continue Protonix    Orthostatic hypotension  -- Continue PTA Florinef          Diet: Regular Diet Adult  Snacks/Supplements Adult: Magic Cup; Between Meals  Snacks/Supplements Adult: Gelatein Plus; Between Meals    DVT Prophylaxis: Argatroban   Naranjo Catheter: Not present  Lines: PRESENT             Cardiac Monitoring: None  Code Status: Full Code      Clinically Significant Risk Factors         # Hyponatremia: Lowest Na = 134 mmol/L in last 2 days, will monitor as appropriate      # Hypoalbuminemia: Lowest albumin = 2.9 g/dL at 9/20/2024  8:24 PM, will monitor as appropriate  # Coagulation Defect: INR = 2.50 (Ref range: 0.85 - 1.15) and/or PTT = 78 Seconds (Ref  range: 22 - 38 Seconds), will monitor for bleeding  # Thrombocytopenia: Lowest platelets = 124 in last 2 days, will monitor for bleeding   # Hypertension: Noted on problem list              # Severe Malnutrition: based on nutrition assessment, PRESENT ON ADMISSION   # Financial/Environmental Concerns:           Disposition Plan     Medically Ready for Discharge: Anticipated in 2-4 Days             Vinnie Nelson MD  Hospitalist Service  [facility]  Securely message with [employer] (more info)  Text page via [employer] Paging/Directory   ______________________________________________________________________    Physical Exam   Vital Signs: Temp: 98.1  F (36.7  C) Temp src: Oral BP: 130/72 Pulse: 91   Resp: 23 SpO2: 96 % O2 Device: Nasal cannula Oxygen Delivery: 1 LPM  Weight: 136 lbs 14.49 oz    GEN: A&Ox3, no acute distress  NEURO: CN II-XII grossly intact. No gross neurologic deficits. Diminished sensation in the L medial foot, but patient reports this is baseline  NECK: trachea midline, no JVD  HEENT: No scleral icterus.  RESP: Nonlabored breathing on room air  CV: RRR by femoral pulse, noncyanotic  MSK: S/P R BKA. Moves all extremities independently. Motor function intact in LLE but mildly limited due to pain.  Necrotic ulcers in the left toes.  L 1st toe with bluish discoloration. Cold extremities   PSYCH: cooperative   PULSES: LLE: Absent distal pulses        Medical Decision Making       40 MINUTES SPENT BY ME on the date of service doing chart review, history, exam, documentation & further activities per the note.      Data   Imaging results reviewed over the past 24 hrs:   No results found for this or any previous visit (from the past 24 hour(s)).

## 2024-09-23 NOTE — PROGRESS NOTES
"Care Management Follow Up    Length of Stay (days): 2    Expected Discharge Date: 09/23/2024     Concerns to be Addressed: BKA      Patient plan of care discussed at interdisciplinary rounds: Yes    Anticipated Discharge Disposition:  Return to [facility]              Anticipated Discharge Services:  therapy  Anticipated Discharge DME:      Patient/family educated on Medicare website which has current facility and service quality ratings:  NA  Education Provided on the Discharge Plan:  Patient/Family in Agreement with the Plan:      Referrals Placed by CM/SW:    Private pay costs discussed: Not applicable    Discussed  Partnership in Safe Discharge Planning  document with patient/family: No     Handoff Completed: No, handoff not indicated or clinically appropriate    Additional Information:  HX: \"Patient recently in hospital 8/25/24-9/4/24; discharged to [facility]. Prior to hospitalizations, patient reports living in his house with spouse, independent with ADLs/IADLs and ambulated with walker.  He states discharge plan is to return to [facility]. Spouse is primary family contact. Transportation at discharge TBD.\"     Anticipating return to [facility].  Patient does have a bed hold, confirmed 9/23.    Plan for BKA this week.    Next Steps: coordinate discharge back to TCU when medically ready    Bridget Tate RN      "

## 2024-09-23 NOTE — TELEPHONE ENCOUNTER
Surgery Scheduled:    Surgery: Below Knee Amputation Left leg    Date: 9/25/24    Time: 130PM    Location: [facility]    Confirmed with Ashli in OR scheduling on 9/23/24.

## 2024-09-23 NOTE — CONSULTS
9/23- Test claim for clopidogrel- filled at another pharmacy will have to call once they open to see what patient actually paid for a copay- cash cost $117 for 30 days $334 for 90 days with no insurance. With Insurance it should not be $500 as it is generically avai. Will update once pharmacy is open  Thank you for allowing me to help with your patient  Svitlana Link [facility]  Pharmacy Discharge Liaison [facility]

## 2024-09-23 NOTE — PLAN OF CARE
Problem: Adult Inpatient Plan of Care  Goal: Absence of Hospital-Acquired Illness or Injury  Intervention: Prevent and Manage VTE (Venous Thromboembolism) Risk  Recent Flowsheet Documentation  Taken 9/23/2024 0400 by Jamison Interiano RN  VTE Prevention/Management: (medications) other (see comments)  Taken 9/22/2024 2000 by Jamison Interiano RN  VTE Prevention/Management: (medications) other (see comments)     Problem: Adult Inpatient Plan of Care  Goal: Optimal Comfort and Wellbeing  Outcome: Progressing     Problem: Adult Inpatient Plan of Care  Goal: Readiness for Transition of Care  Outcome: Progressing     Problem: Risk for Delirium  Goal: Improved Sleep  Outcome: Progressing  Intervention: Promote Sleep  Recent Flowsheet Documentation  Taken 9/23/2024 0400 by Jamison Interiano RN  Sleep/Rest Enhancement:   awakenings minimized   comfort measures   consistent schedule promoted   relaxation techniques promoted   room darkened  Taken 9/22/2024 2000 by Jamison Interiano RN  Sleep/Rest Enhancement:   awakenings minimized   comfort measures   consistent schedule promoted   relaxation techniques promoted   room darkened   [facility] - ICU    RN Progress Note:            Pertinent Assessments:      Please refer to flowsheet rows for full assessment     As charted           Key Events - This Shift:       - argtraban gtt titrated, pharmacy consulted on titration due to order clarification

## 2024-09-23 NOTE — PROGRESS NOTES
"CLINICAL NUTRITION SERVICES - ASSESSMENT NOTE     Nutrition Prescription    RECOMMENDATIONS FOR MDs/PROVIDERS TO ORDER:  Consider thiamin x10 days with malnutrition   Recommend MVI/M with inadequate oral intakes     Malnutrition Status:    Severe malnutrition  In Context of:  Chronic illness or disease    Recommendations already ordered by Registered Dietitian (RD):  Magic cup daily at 10 AM, Gel Plus daily 2 PM     Future/Additional Recommendations:  Monitor po intake, supplement gaurav, weight, labs, I/Os.   Expedite bottle daily pending post-op L-BKA   Monitor BG and need for SF Gel 20      REASON FOR ASSESSMENT  Aubrey Duncan is a/an 69 year old male assessed by the dietitian for Admission Nutrition Risk Screen for unsure weight loss, decreased appetite.     HPI: Pt with history of double lung transplant due to alpha-1 antitrypsin deficiency, prior HITS, type 2 diabetes, bilateral chronic limb-threatening ischemia status post recent right BKA, hypertension and GERD admitted on 9/20/2024 due to worsening left foot and leg pain and was found to have new left acute distal popliteal artery and anterior tibial artery occlusion.      NUTRITION HISTORY  Met with pt at bedside this AM. Pt reports decreased po intakes and limited appetite. Pt with unintentional weight loss. Pt denies nausea, vomiting or abd pain- states he took zofran this AM. Pt reports height between 5'7\" and 5'8\" but unsure exactly.   NKFA    Per chart, potential L-BKA this admit.     CURRENT NUTRITION ORDERS  Diet: Regular  Intake/Tolerance: Poor  Pt eating x1 magic cup and fruit this AM     LABS  Labs reviewed  Na 134(L)   BUN 28.1(H)   -152 last 24 hrs     MEDICATIONS  Medications reviewed  Continuous argatroban   Scheduled novolog, lantus, metamucil, protonix, crestor    ANTHROPOMETRICS  Height: 5'7-8\"   Most Recent Weight: 62.1 kg (136 lb 14.5 oz)    IBW: 63.3-65.9 kg adjusted for R-BKA  BMI: Normal BMI  Weight History: 18.3% wt loss x3 months "   Wt Readings from Last 10 Encounters:   09/21/24 62.1 kg (136 lb 14.5 oz)   09/20/24 61.2 kg (135 lb)   09/18/24 60.4 kg (133 lb 3.2 oz)   09/16/24 64.5 kg (142 lb 3.2 oz)   09/12/24 64.5 kg (142 lb 3.2 oz)   09/09/24 109.9 kg (242 lb 3.2 oz)   09/05/24 62 kg (136 lb 9.6 oz)   08/27/24 67.7 kg (149 lb 4 oz)   08/20/24 65.3 kg (144 lb)   08/13/24 65.3 kg (144 lb)     08/07/24 68.2 kg (150 lb 6.4 oz)   08/02/24 69.4 kg (153 lb)   08/01/24 69.7 kg (153 lb 9.6 oz)   07/31/24 70.3 kg (155 lb)   07/12/24 74.5 kg (164 lb 3.2 oz)   06/28/24 75.8 kg (167 lb)   06/25/24 76 kg (167 lb 9.6 oz)     Dosing Weight: 62.1 kg    ASSESSED NUTRITION NEEDS  Estimated Energy Needs: 1863+ kcals/day (30 + kcals/kg )  Justification: Increased needs  Estimated Protein Needs: 75+ grams protein/day (1.2+ grams of pro/kg)  Justification: Increased needs  Estimated Fluid Needs: 5567-6432 mL/day (25 - 30 mL/kg)   Justification: Maintenance    PHYSICAL FINDINGS  See malnutrition section below.  R- BKA  BM: last noted x1 9/21   Surgical sites     MALNUTRITION:  % Weight Loss:  > 7.5% in 3 months (severe malnutrition)- 18.3% wt loss x3 months   % Intake:  </= 75% for >/= 1 month (severe malnutrition)  Subcutaneous Fat Loss:  Orbital region mild depletion  Muscle Loss:  Temporal region moderate depletion, Clavicle bone region moderate depletion, and Acromion bone region moderate depletion  Fluid Retention:  None noted    Malnutrition Diagnosis: Severe malnutrition  In Context of:  Chronic illness or disease    NUTRITION DIAGNOSIS  Malnutrition related to chronic illness as evidenced by po <75% > 1 month, 18.3% wt loss x3 months. Muscle and fat losses.     INTERVENTIONS  Implementation  Medical food supplement therapy -   Magic cup daily at 10 AM, Gel Plus daily 2 PM   Expedite bottle daily pending post-op L-BKA     Consider thiamin x10 days with malnutrition   Recommend MVI/M with inadequate oral intakes     Goals  Pt to meet >75% nutritional needs    Electrolytes WNL      Monitoring/Evaluation  Progress toward goals will be monitored and evaluated per protocol.

## 2024-09-23 NOTE — PROGRESS NOTES
[facility]    Medicine Progress Note - Hospitalist Service    Date of Admission:  9/20/2024    Assessment & Plan   Aubrey Duncan is a 69 year old male with history of double lung transplant due to alpha-1 antitrypsin deficiency, prior HITS, type 2 diabetes, bilateral chronic limb-threatening ischemia status post recent right BKA, hypertension and GERD admitted on 9/20/2024 due to worsening left foot and leg pain and was found to have new left acute distal popliteal artery and anterior tibial artery occlusion.      CTA revealed extensive occlusion of both deep and superficial arteries in the left lower extremity. Vascular surgery service performed balloon angioplasty of the left anterior tibial artery on 9/21/2024.  Notable operative findings include moderate stenosis of the distal left common femoral artery, new long-segment occlusion of the left superficial femoral artery, reconstitution of the left popliteal artery, and occluded left posterior tibial artery.      IR service was consulted by vascular surgery service for revascularization. Successful recanalization and reconstruction with stents was performed by IR on 9/21/2024 and patient was subsequently placed on tenecteplase. Interventional radiologist recommends tenecteplase overnight for left lower extremity with plan to hold argatroban and perform follow-up angiogram tomorrow.       9/22 :       Hb 7.5, monitor hb  Transfuse prn to keep hb > 7-8    post-angiogram and lytics catheter removal   Plan for amputation in am ?  On argatroban infusion for h/o HIT, acute b/l PE  Vascular surgery following    Not medically ready for discharge at this time.  Multi day stay at this time      A/p :       Bilateral critical limb ischemia  --Hold argatroban for now as patient is receiving tenecteplase  --Continue tenecteplase per IR recommendation  --Angiogram tomorrow per IR  --Vascular surgery [id number] assistance  --Continue  aspirin  --Monitor for compartment syndrome      Acute bilateral PE  --Hold argatroban for now as patient is receiving tenecteplase  -- Resume argatroban when off tenecteplase  --Hold Plavix while on argatroban.  --Unable to afford Plavix since the co-pay was $5000.    Insulin-dependent diabetes  --Continue 5 units of Lantus and sliding scale insulin  --Currently n.p.o.    History of double lung transplant due to alpha-1 antitrypsin deficiency  --Continue antirejection medications  -- Continue inhalers    Recent right below-knee amputation due to PVD  --Continue aspirin Plavix    Essential hypertension  -Hold Lasix due to possible procedure    GERD  -- Continue Protonix    Orthostatic hypotension  -- Continue PTA Florinef          Diet: Regular Diet Adult  NPO per Anesthesia Guidelines for Procedure/Surgery Except for: Meds    DVT Prophylaxis: Argatroban   Naranjo Catheter: Not present  Lines: PRESENT             Cardiac Monitoring: None  Code Status: Full Code      Clinically Significant Risk Factors              # Hypoalbuminemia: Lowest albumin = 2.9 g/dL at 9/20/2024  8:24 PM, will monitor as appropriate  # Coagulation Defect: INR = 2.50 (Ref range: 0.85 - 1.15) and/or PTT = 83 Seconds (Ref range: 22 - 38 Seconds), will monitor for bleeding  # Thrombocytopenia: Lowest platelets = 124 in last 2 days, will monitor for bleeding   # Hypertension: Noted on problem list                 # Financial/Environmental Concerns:           Disposition Plan     Medically Ready for Discharge: Anticipated in 2-4 Days             Vinnie Nelson MD  Hospitalist Service  [facility]  Securely message with [employer] (more info)  Text page via [employer] Paging/Directory   ______________________________________________________________________    Physical Exam   Vital Signs: Temp: 98.4  F (36.9  C) Temp src: Oral BP: 110/55 Pulse: 97   Resp: 18 SpO2: 96 % O2 Device: None (Room air) Oxygen Delivery: 1 LPM  Weight: 136 lbs  14.49 oz    GEN: A&Ox3, no acute distress  NEURO: CN II-XII grossly intact. No gross neurologic deficits. Diminished sensation in the L medial foot, but patient reports this is baseline  NECK: trachea midline, no JVD  HEENT: No scleral icterus.  RESP: Nonlabored breathing on room air  CV: RRR by femoral pulse, noncyanotic  MSK: S/P R BKA. Moves all extremities independently. Motor function intact in LLE but mildly limited due to pain.  Necrotic ulcers in the left toes.  L 1st toe with bluish discoloration. Cold extremities   PSYCH: cooperative   PULSES: LLE: Absent distal pulses        Medical Decision Making       40 MINUTES SPENT BY ME on the date of service doing chart review, history, exam, documentation & further activities per the note.      Data   Imaging results reviewed over the past 24 hrs:   Recent Results (from the past 24 hour(s))   IR Angiogram through catheter (arterial)    Narrative    [facility]  LOCATION: [facility]  DATE: 9/22/2024    PROCEDURE:   1.  ARTERIAL THROMBOLYTIC INFUSION, SUBSEQUENT DAY WITH CATHETER REMOVAL  2.  VASCULAR CLOSURE DEVICE  3.  CONSCIOUS SEDATION    INTERVENTIONAL RADIOLOGIST: Eduardo Dewey MD    INDICATION: 20 hour arterial thrombolytic follow-up for left lower extremity critical limb ischemia. Ischemic left foot.    SEDATION: Versed 1.5 mg. Fentanyl 50 mcg. The procedure was performed with administration intravenous conscious sedation with appropriate preoperative, intraoperative, and postoperative evaluation. During the timeout, immediately prior to administration   of medications, the patient was reassessed for adequacy to receive conscious sedation.  15 minutes of supervised face to face conscious sedation time was provided by a radiology nurse under my direct supervision.    ANTIBIOTICS: None  Air Kerma: 178 mGy  FLUOROSCOPIC TIME: 1.6 minute   CONTRAST: 30 cc Visipaque 320    COMPLICATIONS: No immediate complications.    PROCEDURE:    Follow-up left lower extremity angiography was performed through the infusion catheter as well as the contralateral sheath. Both groins were prepped and draped in usual sterile fashion. Local anesthesia was obtained of the right groin with 1% Xylocaine.   The contralateral sheath and wire access was abandoned. Wire access was secured in the abdominal aorta. The contralateral sheath was exchanged for an ipsilateral 6 Armenian sheath. A right femoral angiogram was performed. Right femoral access was removed   with hemostasis obtained with a single Proglide closure device.    FINDINGS:   Left leg: The left common, external and common femoral arteries are patent. Patent PFA. Patent stented SFA and above-knee popliteal artery. Patent distal popliteal artery. Improved collateral perfusion of the calf through the chronically and severely   diseased tibial vasculature. Peroneal runoff which is severely diseased with segmental occlusion at the mid calf segment. Scant flow seen to the calcaneal region. No flow beyond the proximal foot.      Impression    IMPRESSION:    1.  Left leg: There is in-line flow to the knee. Chronic severe trifurcation occlusive disease, not salvageable by endovascular means. Improved collateral perfusion left calf since previous day. Effectively no flow beyond the proximal foot. Lytic   infusion discontinued. Vascular surgery aware of findings and plan.

## 2024-09-23 NOTE — PLAN OF CARE
"Goal Outcome Evaluation:      Plan of Care Reviewed With: patient    Overall Patient Progress: improvingOverall Patient Progress: improving    Outcome Evaluation: Off bedrest and back on [facility] - ICU    RN Progress Note:            Pertinent Assessments:      Please refer to flowsheet rows for full assessment     Pt slept most of the day in between cares, stating he feels \"just exhausted.\"    Nausea on/off. Treated with IV and po zofran, this may have contributed to pt's sleepiness.     Only 300 ml urine output for day charted. Plan to monitor.              Key Events - This Shift:       To IR this morning, see notes.                 Barriers to Discharge / Downgrade:     Should downgrade out of ICU tomorrow.          Point of Contact Update: YES-OR-NO:YES  Wife and daughter here this morning and updated at bedside.              "

## 2024-09-23 NOTE — PROGRESS NOTES
Vascular surgery progress note    Ongoing left foot pain.  Continues to have left foot weakness and numbness, though motor function is somewhat impaired by pain.    /64   Pulse 92   Temp 98  F (36.7  C) (Oral)   Resp 19   Wt 62.1 kg (136 lb 14.5 oz)   SpO2 93%   BMI 21.44 kg/m      On exam resting in bed, appears generally comfortable  Nonlabored and on nasal cannula  Palpable bilateral femoral pulses  Right percutaneous transfemoral access site is soft, clean, dry, no hematoma  Mottling over the medial left forefoot  Chronic scabs of the left toes  Faint monophasic Doppler signal in the left PT and left peroneal arteries at the ankle, absent left DP Doppler signal  Left foot is warm  Weak left plantar and dorsiflexion, diminished sensation over the left foot      Labs reviewed    Assessment/plan:  69-year-old male with history of lung transplant, bilateral chronic limb-threatening ischemia, previous right below-knee amputation, POD#3 s/p outpatient LLE angiogram with left anterior tibial angioplasty, unable to advance balloon past distal AT occlusion, readmitted with acute on chronic LLE ischemia, s/p 24 hrs catheter directed lytic therapy and left SFA stents placement.  There remains three-vessel occlusion of the left calf demonstrated on his completion angiogram from 9/22/24.  There is some flow to the foot is noted on by faint Doppler signals in the PT and peroneal at the ankle, however we discussed this morning that his ischemic narrated to the left foot is likely permanent.      Discussed the option of left BKA.  Explained there is not an urgency to this, however he is interested in pursuing BKA this admission for relief of his left foot rest pain.    - L BKA to be scheduled for later this week pending OR availability  - okay for diet today  - continue argatroban gtt for acute b/l PE  - continue DAPT for now (coronary stents, SFA stents), can likely change to antiplatelet monotherapy with aspirin  OR plavix if the plan for anticoagulation at discharge due to acute PE, would recommend checking with cardiology    Discussed with staff, Dr. Sneed.    Mario Steele MD

## 2024-09-24 ENCOUNTER — ANESTHESIA EVENT (OUTPATIENT)
Dept: SURGERY | Facility: HOSPITAL | Age: 69
DRG: 239 | End: 2024-09-24
Payer: MEDICARE

## 2024-09-24 LAB
APTT PPP: 73 SECONDS (ref 22–38)
APTT PPP: 76 SECONDS (ref 22–38)
ERYTHROCYTE [DISTWIDTH] IN BLOOD BY AUTOMATED COUNT: 15.9 % (ref 10–15)
GLUCOSE BLDC GLUCOMTR-MCNC: 118 MG/DL (ref 70–99)
GLUCOSE BLDC GLUCOMTR-MCNC: 142 MG/DL (ref 70–99)
GLUCOSE BLDC GLUCOMTR-MCNC: 202 MG/DL (ref 70–99)
GLUCOSE BLDC GLUCOMTR-MCNC: 99 MG/DL (ref 70–99)
HCT VFR BLD AUTO: 24.1 % (ref 40–53)
HGB BLD-MCNC: 7.5 G/DL (ref 13.3–17.7)
MCH RBC QN AUTO: 31.3 PG (ref 26.5–33)
MCHC RBC AUTO-ENTMCNC: 31.1 G/DL (ref 31.5–36.5)
MCV RBC AUTO: 100 FL (ref 78–100)
PLATELET # BLD AUTO: 137 10E3/UL (ref 150–450)
RBC # BLD AUTO: 2.4 10E6/UL (ref 4.4–5.9)
WBC # BLD AUTO: 8.7 10E3/UL (ref 4–11)

## 2024-09-24 PROCEDURE — 99232 SBSQ HOSP IP/OBS MODERATE 35: CPT | Performed by: INTERNAL MEDICINE

## 2024-09-24 PROCEDURE — 250N000013 HC RX MED GY IP 250 OP 250 PS 637: Performed by: INTERNAL MEDICINE

## 2024-09-24 PROCEDURE — 250N000011 HC RX IP 250 OP 636: Performed by: PHYSICIAN ASSISTANT

## 2024-09-24 PROCEDURE — 250N000013 HC RX MED GY IP 250 OP 250 PS 637: Performed by: PHYSICIAN ASSISTANT

## 2024-09-24 PROCEDURE — 85027 COMPLETE CBC AUTOMATED: CPT | Performed by: INTERNAL MEDICINE

## 2024-09-24 PROCEDURE — 36415 COLL VENOUS BLD VENIPUNCTURE: CPT | Performed by: INTERNAL MEDICINE

## 2024-09-24 PROCEDURE — 99232 SBSQ HOSP IP/OBS MODERATE 35: CPT | Performed by: PHYSICIAN ASSISTANT

## 2024-09-24 PROCEDURE — 250N000012 HC RX MED GY IP 250 OP 636 PS 637: Performed by: INTERNAL MEDICINE

## 2024-09-24 PROCEDURE — 250N000011 HC RX IP 250 OP 636: Mod: JZ | Performed by: STUDENT IN AN ORGANIZED HEALTH CARE EDUCATION/TRAINING PROGRAM

## 2024-09-24 PROCEDURE — 85730 THROMBOPLASTIN TIME PARTIAL: CPT | Performed by: INTERNAL MEDICINE

## 2024-09-24 PROCEDURE — 258N000003 HC RX IP 258 OP 636: Performed by: ANESTHESIOLOGY

## 2024-09-24 PROCEDURE — 120N000004 HC R&B MS OVERFLOW

## 2024-09-24 RX ORDER — SODIUM CHLORIDE, SODIUM LACTATE, POTASSIUM CHLORIDE, CALCIUM CHLORIDE 600; 310; 30; 20 MG/100ML; MG/100ML; MG/100ML; MG/100ML
INJECTION, SOLUTION INTRAVENOUS CONTINUOUS
Status: DISCONTINUED | OUTPATIENT
Start: 2024-09-24 | End: 2024-09-26

## 2024-09-24 RX ORDER — LIDOCAINE 40 MG/G
CREAM TOPICAL
Status: DISCONTINUED | OUTPATIENT
Start: 2024-09-24 | End: 2024-09-25

## 2024-09-24 RX ADMIN — SODIUM CHLORIDE, POTASSIUM CHLORIDE, SODIUM LACTATE AND CALCIUM CHLORIDE: 600; 310; 30; 20 INJECTION, SOLUTION INTRAVENOUS at 20:04

## 2024-09-24 RX ADMIN — CARVEDILOL 6.25 MG: 3.12 TABLET, FILM COATED ORAL at 08:05

## 2024-09-24 RX ADMIN — Medication 2 CAPSULE: at 08:06

## 2024-09-24 RX ADMIN — PREDNISONE 2.5 MG: 2.5 TABLET ORAL at 20:12

## 2024-09-24 RX ADMIN — TACROLIMUS 3 MG: 1 CAPSULE ORAL at 20:12

## 2024-09-24 RX ADMIN — VENLAFAXINE HYDROCHLORIDE 37.5 MG: 37.5 CAPSULE, EXTENDED RELEASE ORAL at 08:05

## 2024-09-24 RX ADMIN — ASPIRIN 81 MG: 81 TABLET, COATED ORAL at 08:05

## 2024-09-24 RX ADMIN — PREDNISONE 5 MG: 5 TABLET ORAL at 08:05

## 2024-09-24 RX ADMIN — FLUTICASONE FUROATE AND VILANTEROL TRIFENATATE 1 PUFF: 200; 25 POWDER RESPIRATORY (INHALATION) at 08:06

## 2024-09-24 RX ADMIN — HYDROMORPHONE HYDROCHLORIDE 0.5 MG: 1 INJECTION, SOLUTION INTRAMUSCULAR; INTRAVENOUS; SUBCUTANEOUS at 08:04

## 2024-09-24 RX ADMIN — TRAMADOL HYDROCHLORIDE 25 MG: 50 TABLET, COATED ORAL at 11:34

## 2024-09-24 RX ADMIN — Medication 2 CAPSULE: at 20:12

## 2024-09-24 RX ADMIN — TACROLIMUS 3 MG: 1 CAPSULE ORAL at 08:05

## 2024-09-24 RX ADMIN — HYDROMORPHONE HYDROCHLORIDE 0.5 MG: 1 INJECTION, SOLUTION INTRAMUSCULAR; INTRAVENOUS; SUBCUTANEOUS at 02:39

## 2024-09-24 RX ADMIN — THIAMINE HCL TAB 100 MG 100 MG: 100 TAB at 20:12

## 2024-09-24 RX ADMIN — ACYCLOVIR 400 MG: 400 TABLET ORAL at 20:12

## 2024-09-24 RX ADMIN — INSULIN GLARGINE 5 UNITS: 100 INJECTION, SOLUTION SUBCUTANEOUS at 08:06

## 2024-09-24 RX ADMIN — CARVEDILOL 6.25 MG: 3.12 TABLET, FILM COATED ORAL at 17:37

## 2024-09-24 RX ADMIN — FLUDROCORTISONE ACETATE 0.1 MG: 0.1 TABLET ORAL at 08:05

## 2024-09-24 RX ADMIN — ACYCLOVIR 400 MG: 400 TABLET ORAL at 08:06

## 2024-09-24 RX ADMIN — TRAMADOL HYDROCHLORIDE 25 MG: 50 TABLET, COATED ORAL at 20:11

## 2024-09-24 RX ADMIN — ACETAMINOPHEN 650 MG: 325 TABLET ORAL at 23:42

## 2024-09-24 RX ADMIN — ARGATROBAN 1 MCG/KG/MIN: 50 INJECTION, SOLUTION INTRAVENOUS at 12:58

## 2024-09-24 RX ADMIN — DAPSONE 50 MG: 25 TABLET ORAL at 08:05

## 2024-09-24 RX ADMIN — MONTELUKAST 10 MG: 10 TABLET, FILM COATED ORAL at 20:12

## 2024-09-24 RX ADMIN — PANTOPRAZOLE SODIUM 40 MG: 40 TABLET, DELAYED RELEASE ORAL at 08:05

## 2024-09-24 ASSESSMENT — ACTIVITIES OF DAILY LIVING (ADL)
ADLS_ACUITY_SCORE: 47
ADLS_ACUITY_SCORE: 51
ADLS_ACUITY_SCORE: 47

## 2024-09-24 NOTE — PLAN OF CARE
Goal Outcome Evaluation:      Plan of Care Reviewed With: patient    Overall Patient Progress: no changeOverall Patient Progress: no change         [facility] - ICU    RN Progress Note:            Pertinent Assessments:      Please refer to flowsheet rows for full assessment     Afebrile. Frustrated/flat and withdrawn at times.   BM x1.  Good urine output.            Key Events - This Shift:       Pain management seemed to improve today- one dose of IV dilaudid this morning but only a half tramadol this afternoon.  Remains on argatroban drip, PTT 73 this evening.  Appetite not great. Planned surgery tomorrow at this point- NPO after midnight.                 Barriers to Discharge / Downgrade:     Med/tele status.        Point of Contact Update: YES-OR-NO: Yes  If No, reason:   Name:Kyung  Phone Number:see chart   Summary of Conversation: at bedside and updated on plan of care.

## 2024-09-24 NOTE — PROGRESS NOTES
[facility] ACUTE INPATIENT PAIN SERVICE    [facility], [facility], [facility], [facility], [place]   PAIN PROGRESS      Assessment/Plan:  Aubrey Duncan is a 69 year old male who was admitted on 9/20/2024.  I was asked by Vinnie Nelson MD. Past medical history of  alpha-1 antitrypsin deficiency, prior HITS, type 2 diabetes, bilateral chronic limb-threatening ischemia status post recent right BKA, hypertension and GERD.    Admitted on 9/20/2024 due to worsening left foot and leg pain and was found to have new left acute distal popliteal artery and anterior tibial artery occlusion.      CTA revealed extensive occlusion of both deep and superficial arteries in the left lower extremity. Vascular surgery service performed balloon angioplasty of the left anterior tibial artery on 9/21/2024.  Notable operative findings include moderate stenosis of the distal left common femoral artery, new long-segment occlusion of the left superficial femoral artery, reconstitution of the left popliteal artery, and occluded left posterior tibial artery.       IR service was consulted by vascular surgery service for revascularization. Successful recanalization and reconstruction with stents was performed by IR on 9/21/2024.     shows prescriptions of Hydromorphone and Gabapentin      Opioids used in the past 24h:  *(5) IV hydromorphone 0.5mg      PLAN:  Acute on chronic left lower extremity pain.    Multimodal Medication Therapy:   Adjuvants:   - ASA 81mg daily  -  APAP 650mg q6h prn  - Topical lidocaine  Opioids:  - Discontinue Dilaudid 1mg q6h prn  - Tramadol IR 25-50mg q6h prn  - IV Dilaudid 0.5mg q1h changed to q2h prn  Non-medication interventions- Ice  Constipation Prophylaxis- Senna-S  Follow up /Discharge Recommendations - We recommend prescribing the following at the time of discharge:  TBD              Subjective:  Aubrey is seen today in his bed alert and oriented in no acute distress. Reports his pain is currently a 7/10  located in his left lower limb. He states he will trial PO Tramadol today to see if it is beneficial for his breakthrough pain.      Denies concerns with nausea, vomiting, constipation.      Reviewed continuing with current plan, all questions answered. His wife is bedside with him today and contributes to his overall care.       History   Drug Use No         Tobacco Use      Smoking status: Former        Packs/day: 0.00        Years: 2.0 packs/day for 15.0 years (30.0 ttl pk-yrs)        Types: Cigarettes        Start date: 1971        Quit date: 1986        Years since quittin.7        Passive exposure: Past      Smokeless tobacco: Never        Objective:  Vital signs in last 24 hours:  B/P: 157/81, T: 97.7, P: 91, R: 21   Blood pressure (!) 157/81, pulse 91, temperature 97.7  F (36.5  C), temperature source Oral, resp. rate 21, weight 62.1 kg (136 lb 14.5 oz), SpO2 96%.        Review of Systems:   As per subjective, all others negative.    Physical Exam    General: in no apparent distress and non-toxic   HEENT: Head normocephalic atraumatic, oral mucosa moist. Sclerae anicteric  Resp: Non-labored breathing  GI: Normoactive bowel sounds  Skin: No rashes or lesions  Extremities: Diminished sensation of left foot. Left toe necrotic ulcers.  Psych: Normal affect, mood euthymic  Neuro: Grossly normal             Imaging:  Personally Reviewed.    Results for orders placed or performed during the hospital encounter of 24   CTA Abdomen Pelvis Runoff w Contrast   Result Value Ref Range    Radiologist flags New diagnosis of pulmonary embolism (AA)     Impression    IMPRESSION:  1.  Acute segmental and subsegmental pulmonary emboli in the left lower lobe.    2.  Right lower extremity: Extensive atherosclerotic calcifications. The common iliac, external iliac, internal iliac, and common femoral arteries are patent. The deep femoral artery is occluded. The superficial femoral artery is patent. The popliteal    artery is occluded. The proximal anterior tibial artery is occluded. The tibioperoneal trunk is occluded. There is reconstitution of the posterior tibial and peroneal arteries via collaterals. Below the knee amputation.    3.   Left lower extremity: The left common iliac and left external iliac arteries are patent. The left internal iliac artery is occluded with distal reconstitution. The left common femoral artery is patent. The left deep femoral artery is patent. The   left superficial femoral artery is occluded. There is reconstitution of the popliteal artery via collateral flow. The distal popliteal artery is occluded, new from recent angiogram. Evaluation of the anterior tibial, posterior tibial, and peroneal   arteries is limited due to extensive atherosclerotic calcifications. The tibioperoneal trunk is likely occluded. The anterior tibial artery appears occluded, new from recent angiogram. The peroneal artery is occluded proximally, new from recent   angiogram, with distal reconstitution, with flow to the level of the foot. The proximal posterior tibial artery is occluded with distal reconstitution, with flow to the level of the foot.    4.  Since April 2024, slight increase in size of a large cystic mass in the pancreas.      [Critical Result: New diagnosis of pulmonary embolism]    Finding was identified on 9/21/2024 12:10 AM CDT.     Dr. Venegas was contacted by me on 9/21/2024 12:28 AM CDT and verbalized understanding of the critical result.      IR Lower Extremity Angiogram Left    Impression    IMPRESSION:  1.  Calcified mild focal left common iliac artery stenosis and moderate focal calcified stenosis left external iliac artery, not treated at this setting. Chronically occluded left internal iliac artery.  2.  LEFT LEG: Severe calcified ostial stenosis left PFA treated with 5 mm drug-coated balloon angioplasty. Acute occlusion of chronically and severely diseased left SFA with new occlusive diffuse  dissection of the SFA and popliteal artery segment.   Successful stent reconstruction of the entire SFA and above-knee popliteal artery segment. In-line flow reestablished to the knee. Irregular dissection and/or thrombus in the distal popliteal artery, TP trunk and suspected microembolic occlusions with   chronically and severely diseased tibial vessels prompted thrombolytic infusion. Patient's foot remain severely ischemic below the ankle at this time. Plan for overnight thrombolytic infusion with follow-up angiographic assessment the following day.   Findings and plan discussed with patient's wife Kyung and Dr. Smiley Jay of vascular surgery.     IR Angiogram through catheter (arterial)    Impression    IMPRESSION:    1.  Left leg: There is in-line flow to the knee. Chronic severe trifurcation occlusive disease, not salvageable by endovascular means. Improved collateral perfusion left calf since previous day. Effectively no flow beyond the proximal foot. Lytic   infusion discontinued. Vascular surgery aware of findings and plan.          Lab Results:  Personally Reviewed.   Last Comprehensive Metabolic Panel:  Sodium   Date Value Ref Range Status   09/23/2024 134 (L) 135 - 145 mmol/L Final   06/03/2021 140 134 - 144 mmol/L Final     Potassium   Date Value Ref Range Status   09/23/2024 3.8 3.4 - 5.3 mmol/L Final   10/26/2022 4.5 3.5 - 5.1 mmol/L Final   06/03/2021 4.6 3.5 - 5.1 mmol/L Final     Chloride   Date Value Ref Range Status   09/23/2024 103 98 - 107 mmol/L Final   06/03/2021 105 98 - 107 mmol/L Final     Chloride (External)   Date Value Ref Range Status   11/09/2022 110 (H) 98 - 107 mmol/L Final     Carbon Dioxide   Date Value Ref Range Status   06/03/2021 26 21 - 31 mmol/L Final     Carbon Dioxide (CO2)   Date Value Ref Range Status   09/23/2024 22 22 - 29 mmol/L Final   10/26/2022 28 21 - 31 mmol/L Final     Anion Gap   Date Value Ref Range Status   09/23/2024 9 7 - 15 mmol/L Final   10/26/2022 6 3 - 14  "mmol/L Final   06/03/2021 9 3 - 14 mmol/L Final     Glucose   Date Value Ref Range Status   10/26/2022 92 70 - 105 mg/dL Final   06/03/2021 96 70 - 105 mg/dL Final     GLUCOSE BY METER POCT   Date Value Ref Range Status   09/23/2024 161 (H) 70 - 99 mg/dL Final     Urea Nitrogen   Date Value Ref Range Status   09/23/2024 28.1 (H) 8.0 - 23.0 mg/dL Final   10/26/2022 36 (H) 7 - 25 mg/dL Final   06/03/2021 38 (H) 7 - 25 mg/dL Final     Creatinine   Date Value Ref Range Status   09/23/2024 0.91 0.67 - 1.17 mg/dL Final   06/03/2021 1.29 0.70 - 1.30 mg/dL Final     GFR Estimate   Date Value Ref Range Status   09/23/2024 90 >60 mL/min/1.73m2 Final     Comment:     eGFR calculated using 2021 CKD-EPI equation.   06/03/2021 56 (L) >60 mL/min/[1.73_m2] Final     GFR, ESTIMATED POCT   Date Value Ref Range Status   07/21/2022 59 (L) >60 mL/min/1.73m2 Final     Calcium   Date Value Ref Range Status   09/23/2024 7.4 (L) 8.8 - 10.4 mg/dL Final     Comment:     Reference intervals for this test were updated on 7/16/2024 to reflect our healthy population more accurately. There may be differences in the flagging of prior results with similar values performed with this method. Those prior results can be interpreted in the context of the updated reference intervals.   06/03/2021 8.9 8.6 - 10.3 mg/dL Final        UA: No results found for: \"UAMP\", \"UBARB\", \"BENZODIAZEUR\", \"UCANN\", \"UCOC\", \"OPIT\", \"UPCP\"           Please see A&P for additional details of medical decision making.      Isma Irving PA-C  Acute Care Pain Management  Team  Hours of pain coverage Mon-Fri 8-1600, afterhours please call the house officer   [facility] (RIZ[place]NA, PATRICEs, [place], RH)   Page via [employer] text web console -Click for [employer]           "

## 2024-09-24 NOTE — PROGRESS NOTES
"Care Management Follow Up    Length of Stay (days): 3    Expected Discharge Date: 09/23/2024    Anticipated Discharge Plan:   TCU     Transportation: Anticipate medical transport    PT Recommendations:  No therapy consults placed at this time, will need in order for TCU to be able to accept for insurance purposes.     OT Recommendations:        Barriers to Discharge: medical stability/ plans for Left BKA. Vascular surgery following .       Prior Living Situation: \" Patient recently in hospital 8/25/24-9/4/24; [facility]. Prior to hospitalizations, patient reports living in his house with spouse, independent with ADLs/IADLs and ambulated with walker.  He states [facility] plan [facility]. Spouse is primary family contact. Transportation at discharge TBD. Wife canceled bed hold 9/24, [place] pt [place] to [place].\"        Discussed  Partnership in Safe Discharge Planning  document with patient/family: No     Handoff Completed: Not at this time     Patient/Spokesperson Updated: Yes. Who? Pt and pt's spouse     Additional Information:    Spouse Kyung/ and pt gave writer choices for TCU. They gave up bed hold at [facility] in [place], with hopes they can find something [place] north. Pt's wife has been staying at daughter's in [place] while pt was in TCU prior.       They requested in order  1.[facility]   2.  In [place]   3. [facility] in [place]       TCU referrals not sent until therapy recs provided, for referral purposes.       Next Steps: Follow therapy recs, and send TCU referrals. Cm will continue to follow plan of care,review recommendations,and assist with any discharge needs anticipated.       Sanaz Alarcon RN      "

## 2024-09-24 NOTE — PROGRESS NOTES
VASCULAR SURGERY PROGRESS NOTE    Subjective:  Patient was seen and evaluated at the bedside for surgical follow up. Pain controlled with IV dilaudid. In agreement to proceed with surgery tomorrow. All questions answered. No other concerns.    Objective:  Intake/Output Summary (Last 24 hours) at 9/24/2024 0800  Last data filed at 9/24/2024 0600  Gross per 24 hour   Intake 1586.06 ml   Output 1150 ml   Net 436.06 ml     PHYSICAL EXAM:  BP (!) 157/81   Pulse 91   Temp 97.7  F (36.5  C) (Oral)   Resp 21   Wt 62.1 kg (136 lb 14.5 oz)   SpO2 96%   BMI 21.44 kg/m    General: The patient is alert and oriented. Appropriate. No acute distress  Psych: Pleasant affect, answers questions appropriately  Skin: Color appropriate for race, warm, dry.  Respiratory: Normal respiratory effort   Extremities: Groin access clean and intact without hematoma      Imaging:   Pertinent imaging reviewed    ASSESSMENT:  69-year-old male with history of lung transplant, bilateral chronic limb-threatening ischemia, previous right below-knee amputation, POD#4 s/p outpatient LLE angiogram with left anterior tibial angioplasty, unable to advance balloon past distal AT occlusion, readmitted with acute on chronic LLE ischemia, s/p 24 hrs catheter directed lytic therapy and left SFA stents placement.    There remains three-vessel occlusion of the left calf demonstrated on his completion angiogram from 9/22/24.  There is some flow to the foot is noted on by faint Doppler signals in the PT and peroneal at the ankle, however we discussed this morning that his ischemic narrated to the left foot is likely permanent.      PLAN:  Left BKA scheduled for tomorrow  NPO at midnight  Continue DAPT for now  Discussing with team when to hold Argatroban for surgery     Svetlana Escobedo PA-C  VASCULAR SURGERY

## 2024-09-24 NOTE — PLAN OF CARE
[facility] - ICU    RN Progress Note:            Pertinent Assessments:      Please refer to flowsheet rows for full assessment     vascular            Key Events - This Shift:       Uneventful night; Left Dorsalis Pedis remains absent with the posterior tibial present under doppler. Pt reports adequate pain control with current PRN regimen.                Barriers to Discharge / Downgrade:     Surgical L BKA         Point of Contact Update: YES-OR-NO: No  If No, reason: Not present           Goal Outcome Evaluation:      Plan of Care Reviewed With: patient    Overall Patient Progress: no changeOverall Patient Progress: no change    Outcome Evaluation: Argatroban infusing; waiting on L BKA amputation.

## 2024-09-24 NOTE — CONSULTS
SPIRITUAL HEALTH SERVICES Progress Note  [facility], ICU    Saw pt Aubrey Duncan per consult order/length of stay assessment.    Patient/Family Understanding of Illness and Goals of Care - Aubrey shared about his medical history as it relates to his recent amputation and that he will be having his other leg amputated similar to his right leg. He also shared about his double lung transplant about 11 years ago and shared that it has gone very well for him most of the time.     Distress and Loss - Upcoming amputation and adapting to this major life change. He is not too distressed about it at the moment.     Strengths, Coping, and Resources - Family is source of support. He has a daughter that lives in [place], where is his wife is staying for now as they sell their own home. He shared about his 51 years of marriage.    Meaning, Beliefs, and Spirituality - Patient comes from [employer] background and derives meaning, purpose, and comfort from alex. He comes from [employer] tradition and his wife [employer]; no current connection to alex community.     Plan of Care - A  will continue to visit as able or per request by patient/family/staff.      Jimbo Simms MDiv, [facility]  /Manager Spiritual Health Services  [phone]       Spiritual Health Services is available 24/7 for emergent requests and consults, either by paging the on-call  or by entering an ASAP/STAT consult in [facility], which will also page the on-call .

## 2024-09-24 NOTE — PROGRESS NOTES
[facility]    Medicine Progress Note - Hospitalist Service    Date of Admission:  9/20/2024    Assessment & Plan   Aubrey Duncan is a 69 year old male with history of double lung transplant due to alpha-1 antitrypsin deficiency, prior HITS, type 2 diabetes, bilateral chronic limb-threatening ischemia status post recent right BKA, hypertension and GERD admitted on 9/20/2024 due to worsening left foot and leg pain and was found to have new left acute distal popliteal artery and anterior tibial artery occlusion.      CTA revealed extensive occlusion of both deep and superficial arteries in the left lower extremity. Vascular surgery service performed balloon angioplasty of the left anterior tibial artery on 9/21/2024.  Notable operative findings include moderate stenosis of the distal left common femoral artery, new long-segment occlusion of the left superficial femoral artery, reconstitution of the left popliteal artery, and occluded left posterior tibial artery.      IR service was consulted by vascular surgery service for revascularization. Successful recanalization and reconstruction with stents was performed by IR on 9/21/2024 and patient was subsequently placed on tenecteplase. Interventional radiologist recommends tenecteplase overnight for left lower extremity with plan to hold argatroban and perform follow-up angiogram tomorrow.       9/22 :       Hb 7.5, monitor hb  Transfuse prn to keep hb > 7-8    post-angiogram and lytics catheter removal   Plan for amputation in am ?  On argatroban infusion for h/o HIT, acute b/l PE  Vascular surgery following    Not medically ready for discharge at this time.  Multi day stay at this time        9/23 :     On 1 liter of oxygen  Denies any significant sob    Hb 7.5--7.4, monitor hb  Transfuse prn to keep hb > 7-8  Platelets : 156--136--124    post-angiogram and lytics catheter removal   Plan for amputation on wednesday  On argatroban infusion for  h/o HIT, acute b/l PE  Vascular surgery following : continue DAPT for now (coronary stents, SFA stents), can likely change to antiplatelet monotherapy with aspirin OR plavix if the plan for anticoagulation at discharge due to acute PE, would recommend checking with cardiology     Not medically ready for discharge at this time.  Multi day stay at this time        9/24 :     No new issues noted today    Off and on 1 liter of oxygen  Denies any significant sob    Hb 7.5--7.4---7.5, monitor hb  Transfuse prn to keep hb > 7-8  Platelets : 156--136--124---137    post-angiogram and lytics catheter removal   Plan for amputation on wednesday  On argatroban infusion for h/o HIT, acute b/l PE  Vascular surgery following : continue DAPT for now (coronary stents, SFA stents), can likely change to antiplatelet monotherapy with aspirin OR plavix if the plan for anticoagulation at discharge due to acute PE, would recommend checking with cardiology     Transfer out of ICU to med floor    Not medically ready for discharge at this time.  Multi day stay at this time        A/p :       Bilateral critical limb ischemia  --Hold argatroban for now as patient is receiving tenecteplase  --Continue tenecteplase per IR recommendation  --Angiogram tomorrow per IR  --Vascular surgery [id number] assistance  --Continue aspirin  --Monitor for compartment syndrome      Acute bilateral PE  --Hold argatroban for now as patient is receiving tenecteplase  -- Resume argatroban when off tenecteplase  --Hold Plavix while on argatroban.  --Unable to afford Plavix since the co-pay was $5000.    Insulin-dependent diabetes  --Continue 5 units of Lantus and sliding scale insulin  --Currently n.p.o.    History of double lung transplant due to alpha-1 antitrypsin deficiency  --Continue antirejection medications  -- Continue inhalers    Recent right below-knee amputation due to PVD  --Continue aspirin Plavix    Essential hypertension  -Hold Lasix due to  possible procedure    GERD  -- Continue Protonix    Orthostatic hypotension  -- Continue PTA Florinef          Diet: Regular Diet Adult  Snacks/Supplements Adult: Magic Cup; Between Meals  Snacks/Supplements Adult: Gelatein Plus; Between Meals    DVT Prophylaxis: Argatroban   Naranjo Catheter: Not present  Lines: None       Cardiac Monitoring: None  Code Status: Full Code      Clinically Significant Risk Factors         # Hyponatremia: Lowest Na = 134 mmol/L in last 2 days, will monitor as appropriate      # Hypoalbuminemia: Lowest albumin = 2.9 g/dL at 9/20/2024  8:24 PM, will monitor as appropriate  # Coagulation Defect: INR = 2.50 (Ref range: 0.85 - 1.15) and/or PTT = 73 Seconds (Ref range: 22 - 38 Seconds), will monitor for bleeding  # Thrombocytopenia: Lowest platelets = 124 in last 2 days, will monitor for bleeding   # Hypertension: Noted on problem list              # Severe Malnutrition: based on nutrition assessment, PRESENT ON ADMISSION   # Financial/Environmental Concerns:           Disposition Plan     Medically Ready for Discharge: Anticipated in 2-4 Days             Vinnie Nelson MD  Hospitalist Service  [facility]  Securely message with [employer] (more info)  Text page via [employer] Paging/Directory   ______________________________________________________________________    Physical Exam   Vital Signs: Temp: 98.1  F (36.7  C) Temp src: Oral BP: (!) 163/83 Pulse: 95   Resp: (!) 31 SpO2: 94 % O2 Device: None (Room air) Oxygen Delivery: 1 LPM  Weight: 136 lbs 14.49 oz    GEN: A&Ox3, no acute distress  NEURO: CN II-XII grossly intact. No gross neurologic deficits. Diminished sensation in the L medial foot, but patient reports this is baseline  NECK: trachea midline, no JVD  HEENT: No scleral icterus.  RESP: Nonlabored breathing on room air  CV: RRR by femoral pulse, noncyanotic  MSK: S/P R BKA. Moves all extremities independently. Motor function intact in LLE but mildly limited due to  pain.  Necrotic ulcers in the left toes.  L 1st toe with bluish discoloration. Cold extremities   PSYCH: cooperative   PULSES: LLE: Absent distal pulses        Medical Decision Making       40 MINUTES SPENT BY ME on the date of service doing chart review, history, exam, documentation & further activities per the note.      Data   Imaging results reviewed over the past 24 hrs:   No results found for this or any previous visit (from the past 24 hour(s)).

## 2024-09-25 ENCOUNTER — TELEPHONE (OUTPATIENT)
Dept: HEMATOLOGY | Facility: CLINIC | Age: 69
End: 2024-09-25
Payer: MEDICARE

## 2024-09-25 ENCOUNTER — ANESTHESIA (OUTPATIENT)
Dept: SURGERY | Facility: HOSPITAL | Age: 69
DRG: 239 | End: 2024-09-25
Payer: MEDICARE

## 2024-09-25 LAB
ABO/RH(D): NORMAL
ANTIBODY SCREEN: NEGATIVE
APTT PPP: 49 SECONDS (ref 22–38)
APTT PPP: 62 SECONDS (ref 22–38)
APTT PPP: 64 SECONDS (ref 22–38)
APTT PPP: 73 SECONDS (ref 22–38)
ERYTHROCYTE [DISTWIDTH] IN BLOOD BY AUTOMATED COUNT: 15.2 % (ref 10–15)
GLUCOSE BLDC GLUCOMTR-MCNC: 103 MG/DL (ref 70–99)
GLUCOSE BLDC GLUCOMTR-MCNC: 94 MG/DL (ref 70–99)
GLUCOSE BLDC GLUCOMTR-MCNC: 98 MG/DL (ref 70–99)
GLUCOSE BLDC GLUCOMTR-MCNC: 98 MG/DL (ref 70–99)
HCT VFR BLD AUTO: 23.9 % (ref 40–53)
HGB BLD-MCNC: 7.7 G/DL (ref 13.3–17.7)
MCH RBC QN AUTO: 31.8 PG (ref 26.5–33)
MCHC RBC AUTO-ENTMCNC: 32.2 G/DL (ref 31.5–36.5)
MCV RBC AUTO: 99 FL (ref 78–100)
PLATELET # BLD AUTO: 150 10E3/UL (ref 150–450)
RBC # BLD AUTO: 2.42 10E6/UL (ref 4.4–5.9)
SPECIMEN EXPIRATION DATE: NORMAL
WBC # BLD AUTO: 9 10E3/UL (ref 4–11)

## 2024-09-25 PROCEDURE — 250N000009 HC RX 250: Performed by: SURGERY

## 2024-09-25 PROCEDURE — 999N000141 HC STATISTIC PRE-PROCEDURE NURSING ASSESSMENT: Performed by: SURGERY

## 2024-09-25 PROCEDURE — 250N000011 HC RX IP 250 OP 636: Performed by: NURSE ANESTHETIST, CERTIFIED REGISTERED

## 2024-09-25 PROCEDURE — 258N000003 HC RX IP 258 OP 636: Performed by: NURSE ANESTHETIST, CERTIFIED REGISTERED

## 2024-09-25 PROCEDURE — 710N000010 HC RECOVERY PHASE 1, LEVEL 2, PER MIN: Performed by: SURGERY

## 2024-09-25 PROCEDURE — 85730 THROMBOPLASTIN TIME PARTIAL: CPT | Performed by: STUDENT IN AN ORGANIZED HEALTH CARE EDUCATION/TRAINING PROGRAM

## 2024-09-25 PROCEDURE — 27880 AMPUTATION OF LOWER LEG: CPT | Performed by: STUDENT IN AN ORGANIZED HEALTH CARE EDUCATION/TRAINING PROGRAM

## 2024-09-25 PROCEDURE — 99232 SBSQ HOSP IP/OBS MODERATE 35: CPT | Performed by: PHYSICIAN ASSISTANT

## 2024-09-25 PROCEDURE — 250N000011 HC RX IP 250 OP 636: Performed by: STUDENT IN AN ORGANIZED HEALTH CARE EDUCATION/TRAINING PROGRAM

## 2024-09-25 PROCEDURE — 36415 COLL VENOUS BLD VENIPUNCTURE: CPT | Performed by: INTERNAL MEDICINE

## 2024-09-25 PROCEDURE — 250N000011 HC RX IP 250 OP 636: Performed by: ANESTHESIOLOGY

## 2024-09-25 PROCEDURE — 250N000009 HC RX 250: Performed by: NURSE ANESTHETIST, CERTIFIED REGISTERED

## 2024-09-25 PROCEDURE — 27880 AMPUTATION OF LOWER LEG: CPT | Performed by: NURSE ANESTHETIST, CERTIFIED REGISTERED

## 2024-09-25 PROCEDURE — 88307 TISSUE EXAM BY PATHOLOGIST: CPT | Mod: TC | Performed by: SURGERY

## 2024-09-25 PROCEDURE — 85730 THROMBOPLASTIN TIME PARTIAL: CPT | Performed by: NURSE ANESTHETIST, CERTIFIED REGISTERED

## 2024-09-25 PROCEDURE — 250N000025 HC SEVOFLURANE, PER MIN: Performed by: SURGERY

## 2024-09-25 PROCEDURE — 250N000013 HC RX MED GY IP 250 OP 250 PS 637: Performed by: INTERNAL MEDICINE

## 2024-09-25 PROCEDURE — 85730 THROMBOPLASTIN TIME PARTIAL: CPT | Performed by: INTERNAL MEDICINE

## 2024-09-25 PROCEDURE — 36415 COLL VENOUS BLD VENIPUNCTURE: CPT | Performed by: STUDENT IN AN ORGANIZED HEALTH CARE EDUCATION/TRAINING PROGRAM

## 2024-09-25 PROCEDURE — 0Y6J0Z1 DETACHMENT AT LEFT LOWER LEG, HIGH, OPEN APPROACH: ICD-10-PCS | Performed by: SURGERY

## 2024-09-25 PROCEDURE — 86900 BLOOD TYPING SEROLOGIC ABO: CPT | Performed by: STUDENT IN AN ORGANIZED HEALTH CARE EDUCATION/TRAINING PROGRAM

## 2024-09-25 PROCEDURE — 360N000076 HC SURGERY LEVEL 3, PER MIN: Performed by: SURGERY

## 2024-09-25 PROCEDURE — 272N000001 HC OR GENERAL SUPPLY STERILE: Performed by: SURGERY

## 2024-09-25 PROCEDURE — 36415 COLL VENOUS BLD VENIPUNCTURE: CPT | Performed by: NURSE ANESTHETIST, CERTIFIED REGISTERED

## 2024-09-25 PROCEDURE — 210N000001 HC R&B IMCU HEART CARE

## 2024-09-25 PROCEDURE — 258N000003 HC RX IP 258 OP 636: Performed by: STUDENT IN AN ORGANIZED HEALTH CARE EDUCATION/TRAINING PROGRAM

## 2024-09-25 PROCEDURE — 250N000012 HC RX MED GY IP 250 OP 636 PS 637: Performed by: INTERNAL MEDICINE

## 2024-09-25 PROCEDURE — 86901 BLOOD TYPING SEROLOGIC RH(D): CPT | Performed by: STUDENT IN AN ORGANIZED HEALTH CARE EDUCATION/TRAINING PROGRAM

## 2024-09-25 PROCEDURE — 370N000017 HC ANESTHESIA TECHNICAL FEE, PER MIN: Performed by: SURGERY

## 2024-09-25 PROCEDURE — 250N000013 HC RX MED GY IP 250 OP 250 PS 637: Performed by: STUDENT IN AN ORGANIZED HEALTH CARE EDUCATION/TRAINING PROGRAM

## 2024-09-25 PROCEDURE — 86923 COMPATIBILITY TEST ELECTRIC: CPT | Performed by: STUDENT IN AN ORGANIZED HEALTH CARE EDUCATION/TRAINING PROGRAM

## 2024-09-25 PROCEDURE — 250N000012 HC RX MED GY IP 250 OP 636 PS 637: Performed by: STUDENT IN AN ORGANIZED HEALTH CARE EDUCATION/TRAINING PROGRAM

## 2024-09-25 PROCEDURE — 85014 HEMATOCRIT: CPT | Performed by: INTERNAL MEDICINE

## 2024-09-25 PROCEDURE — 99100 ANES PT EXTEME AGE<1 YR&>70: CPT | Performed by: NURSE ANESTHETIST, CERTIFIED REGISTERED

## 2024-09-25 PROCEDURE — 86923 COMPATIBILITY TEST ELECTRIC: CPT | Performed by: PHYSICIAN ASSISTANT

## 2024-09-25 PROCEDURE — 99232 SBSQ HOSP IP/OBS MODERATE 35: CPT | Performed by: INTERNAL MEDICINE

## 2024-09-25 PROCEDURE — 27880 AMPUTATION OF LOWER LEG: CPT | Mod: 79 | Performed by: SURGERY

## 2024-09-25 PROCEDURE — 258N000003 HC RX IP 258 OP 636: Performed by: ANESTHESIOLOGY

## 2024-09-25 RX ORDER — FONDAPARINUX SODIUM 2.5 MG/.5ML
2.5 INJECTION SUBCUTANEOUS ONCE
Status: DISCONTINUED | OUTPATIENT
Start: 2024-09-25 | End: 2024-09-25

## 2024-09-25 RX ORDER — ACETAMINOPHEN 325 MG/1
650 TABLET ORAL EVERY 4 HOURS PRN
Status: DISCONTINUED | OUTPATIENT
Start: 2024-09-28 | End: 2024-09-27

## 2024-09-25 RX ORDER — BISACODYL 10 MG
10 SUPPOSITORY, RECTAL RECTAL DAILY PRN
Status: DISCONTINUED | OUTPATIENT
Start: 2024-09-28 | End: 2024-10-01 | Stop reason: HOSPADM

## 2024-09-25 RX ORDER — HYDROMORPHONE HCL IN WATER/PF 6 MG/30 ML
0.2 PATIENT CONTROLLED ANALGESIA SYRINGE INTRAVENOUS EVERY 5 MIN PRN
Status: DISCONTINUED | OUTPATIENT
Start: 2024-09-25 | End: 2024-09-25 | Stop reason: HOSPADM

## 2024-09-25 RX ORDER — SODIUM CHLORIDE, SODIUM LACTATE, POTASSIUM CHLORIDE, CALCIUM CHLORIDE 600; 310; 30; 20 MG/100ML; MG/100ML; MG/100ML; MG/100ML
INJECTION, SOLUTION INTRAVENOUS CONTINUOUS PRN
Status: DISCONTINUED | OUTPATIENT
Start: 2024-09-25 | End: 2024-09-25

## 2024-09-25 RX ORDER — ONDANSETRON 2 MG/ML
INJECTION INTRAMUSCULAR; INTRAVENOUS PRN
Status: DISCONTINUED | OUTPATIENT
Start: 2024-09-25 | End: 2024-09-25

## 2024-09-25 RX ORDER — FENTANYL CITRATE 50 UG/ML
100 INJECTION, SOLUTION INTRAMUSCULAR; INTRAVENOUS ONCE
Status: COMPLETED | OUTPATIENT
Start: 2024-09-25 | End: 2024-09-25

## 2024-09-25 RX ORDER — CEFAZOLIN SODIUM/WATER 2 G/20 ML
2 SYRINGE (ML) INTRAVENOUS SEE ADMIN INSTRUCTIONS
Status: DISCONTINUED | OUTPATIENT
Start: 2024-09-25 | End: 2024-09-30

## 2024-09-25 RX ORDER — FENTANYL CITRATE 50 UG/ML
25 INJECTION, SOLUTION INTRAMUSCULAR; INTRAVENOUS EVERY 5 MIN PRN
Status: DISCONTINUED | OUTPATIENT
Start: 2024-09-25 | End: 2024-09-25 | Stop reason: HOSPADM

## 2024-09-25 RX ORDER — AMOXICILLIN 250 MG
1 CAPSULE ORAL 2 TIMES DAILY
Status: DISCONTINUED | OUTPATIENT
Start: 2024-09-25 | End: 2024-09-26

## 2024-09-25 RX ORDER — ONDANSETRON 4 MG/1
4 TABLET, ORALLY DISINTEGRATING ORAL EVERY 30 MIN PRN
Status: CANCELLED | OUTPATIENT
Start: 2024-09-25

## 2024-09-25 RX ORDER — FENTANYL CITRATE 50 UG/ML
100 INJECTION, SOLUTION INTRAMUSCULAR; INTRAVENOUS
Status: DISCONTINUED | OUTPATIENT
Start: 2024-09-25 | End: 2024-09-25

## 2024-09-25 RX ORDER — CEFAZOLIN SODIUM/WATER 2 G/20 ML
2 SYRINGE (ML) INTRAVENOUS
Status: DISCONTINUED | OUTPATIENT
Start: 2024-09-25 | End: 2024-09-30

## 2024-09-25 RX ORDER — CEFAZOLIN SODIUM 1 G/3ML
1 INJECTION, POWDER, FOR SOLUTION INTRAMUSCULAR; INTRAVENOUS EVERY 8 HOURS
Status: COMPLETED | OUTPATIENT
Start: 2024-09-25 | End: 2024-09-26

## 2024-09-25 RX ORDER — LIDOCAINE 40 MG/G
CREAM TOPICAL
Status: DISCONTINUED | OUTPATIENT
Start: 2024-09-25 | End: 2024-10-01 | Stop reason: HOSPADM

## 2024-09-25 RX ORDER — HYDROMORPHONE HCL IN WATER/PF 6 MG/30 ML
0.2 PATIENT CONTROLLED ANALGESIA SYRINGE INTRAVENOUS
Status: DISCONTINUED | OUTPATIENT
Start: 2024-09-25 | End: 2024-09-27

## 2024-09-25 RX ORDER — NALOXONE HYDROCHLORIDE 0.4 MG/ML
0.1 INJECTION, SOLUTION INTRAMUSCULAR; INTRAVENOUS; SUBCUTANEOUS
Status: CANCELLED | OUTPATIENT
Start: 2024-09-25

## 2024-09-25 RX ORDER — MAGNESIUM HYDROXIDE 1200 MG/15ML
LIQUID ORAL PRN
Status: DISCONTINUED | OUTPATIENT
Start: 2024-09-25 | End: 2024-09-25 | Stop reason: HOSPADM

## 2024-09-25 RX ORDER — ONDANSETRON 2 MG/ML
4 INJECTION INTRAMUSCULAR; INTRAVENOUS EVERY 6 HOURS PRN
Status: DISCONTINUED | OUTPATIENT
Start: 2024-09-25 | End: 2024-10-01 | Stop reason: HOSPADM

## 2024-09-25 RX ORDER — FENTANYL CITRATE 50 UG/ML
50 INJECTION, SOLUTION INTRAMUSCULAR; INTRAVENOUS EVERY 5 MIN PRN
Status: DISCONTINUED | OUTPATIENT
Start: 2024-09-25 | End: 2024-09-25 | Stop reason: HOSPADM

## 2024-09-25 RX ORDER — ASPIRIN 81 MG/1
81 TABLET ORAL DAILY
Status: DISCONTINUED | OUTPATIENT
Start: 2024-09-26 | End: 2024-10-01 | Stop reason: HOSPADM

## 2024-09-25 RX ORDER — ACETAMINOPHEN 325 MG/1
975 TABLET ORAL EVERY 8 HOURS
Status: DISCONTINUED | OUTPATIENT
Start: 2024-09-25 | End: 2024-09-27

## 2024-09-25 RX ORDER — HYDROMORPHONE HCL IN WATER/PF 6 MG/30 ML
0.4 PATIENT CONTROLLED ANALGESIA SYRINGE INTRAVENOUS EVERY 5 MIN PRN
Status: DISCONTINUED | OUTPATIENT
Start: 2024-09-25 | End: 2024-09-25 | Stop reason: HOSPADM

## 2024-09-25 RX ORDER — ACETAMINOPHEN 325 MG/1
975 TABLET ORAL ONCE
Status: DISCONTINUED | OUTPATIENT
Start: 2024-09-25 | End: 2024-09-25

## 2024-09-25 RX ORDER — SODIUM CHLORIDE, SODIUM LACTATE, POTASSIUM CHLORIDE, CALCIUM CHLORIDE 600; 310; 30; 20 MG/100ML; MG/100ML; MG/100ML; MG/100ML
INJECTION, SOLUTION INTRAVENOUS CONTINUOUS
Status: DISCONTINUED | OUTPATIENT
Start: 2024-09-25 | End: 2024-09-25 | Stop reason: HOSPADM

## 2024-09-25 RX ORDER — HYDROMORPHONE HCL IN WATER/PF 6 MG/30 ML
0.4 PATIENT CONTROLLED ANALGESIA SYRINGE INTRAVENOUS
Status: DISCONTINUED | OUTPATIENT
Start: 2024-09-25 | End: 2024-09-27

## 2024-09-25 RX ORDER — POLYETHYLENE GLYCOL 3350 17 G/17G
17 POWDER, FOR SOLUTION ORAL DAILY
Status: DISCONTINUED | OUTPATIENT
Start: 2024-09-26 | End: 2024-09-26

## 2024-09-25 RX ORDER — PROPOFOL 10 MG/ML
INJECTION, EMULSION INTRAVENOUS PRN
Status: DISCONTINUED | OUTPATIENT
Start: 2024-09-25 | End: 2024-09-25

## 2024-09-25 RX ORDER — MAGNESIUM SULFATE 4 G/50ML
4 INJECTION INTRAVENOUS ONCE
Status: COMPLETED | OUTPATIENT
Start: 2024-09-25 | End: 2024-09-25

## 2024-09-25 RX ORDER — NALOXONE HYDROCHLORIDE 0.4 MG/ML
0.1 INJECTION, SOLUTION INTRAMUSCULAR; INTRAVENOUS; SUBCUTANEOUS
Status: DISCONTINUED | OUTPATIENT
Start: 2024-09-25 | End: 2024-09-25 | Stop reason: HOSPADM

## 2024-09-25 RX ORDER — ONDANSETRON 2 MG/ML
4 INJECTION INTRAMUSCULAR; INTRAVENOUS EVERY 30 MIN PRN
Status: CANCELLED | OUTPATIENT
Start: 2024-09-25

## 2024-09-25 RX ORDER — ONDANSETRON 4 MG/1
4 TABLET, ORALLY DISINTEGRATING ORAL EVERY 30 MIN PRN
Status: DISCONTINUED | OUTPATIENT
Start: 2024-09-25 | End: 2024-09-25 | Stop reason: HOSPADM

## 2024-09-25 RX ORDER — ONDANSETRON 2 MG/ML
4 INJECTION INTRAMUSCULAR; INTRAVENOUS EVERY 30 MIN PRN
Status: DISCONTINUED | OUTPATIENT
Start: 2024-09-25 | End: 2024-09-25 | Stop reason: HOSPADM

## 2024-09-25 RX ORDER — ONDANSETRON 4 MG/1
4 TABLET, ORALLY DISINTEGRATING ORAL EVERY 6 HOURS PRN
Status: DISCONTINUED | OUTPATIENT
Start: 2024-09-25 | End: 2024-10-01 | Stop reason: HOSPADM

## 2024-09-25 RX ORDER — BUPIVACAINE HYDROCHLORIDE 5 MG/ML
INJECTION, SOLUTION EPIDURAL; INTRACAUDAL
Status: COMPLETED | OUTPATIENT
Start: 2024-09-25 | End: 2024-09-25

## 2024-09-25 RX ORDER — FENTANYL CITRATE 50 UG/ML
INJECTION, SOLUTION INTRAMUSCULAR; INTRAVENOUS PRN
Status: DISCONTINUED | OUTPATIENT
Start: 2024-09-25 | End: 2024-09-25

## 2024-09-25 RX ADMIN — BUPIVACAINE HYDROCHLORIDE 10 ML: 5 INJECTION, SOLUTION EPIDURAL; INTRACAUDAL; PERINEURAL at 11:35

## 2024-09-25 RX ADMIN — Medication 2 CAPSULE: at 20:35

## 2024-09-25 RX ADMIN — PANTOPRAZOLE SODIUM 40 MG: 40 TABLET, DELAYED RELEASE ORAL at 09:01

## 2024-09-25 RX ADMIN — AZITHROMYCIN DIHYDRATE 250 MG: 250 TABLET, FILM COATED ORAL at 09:02

## 2024-09-25 RX ADMIN — FLUTICASONE FUROATE AND VILANTEROL TRIFENATATE 1 PUFF: 200; 25 POWDER RESPIRATORY (INHALATION) at 09:02

## 2024-09-25 RX ADMIN — VENLAFAXINE HYDROCHLORIDE 37.5 MG: 37.5 CAPSULE, EXTENDED RELEASE ORAL at 09:02

## 2024-09-25 RX ADMIN — FENTANYL CITRATE 50 MCG: 50 INJECTION INTRAMUSCULAR; INTRAVENOUS at 12:10

## 2024-09-25 RX ADMIN — FENTANYL CITRATE 50 MCG: 50 INJECTION INTRAMUSCULAR; INTRAVENOUS at 12:53

## 2024-09-25 RX ADMIN — Medication 2 G: at 12:15

## 2024-09-25 RX ADMIN — PREDNISONE 5 MG: 5 TABLET ORAL at 09:01

## 2024-09-25 RX ADMIN — SODIUM CHLORIDE, POTASSIUM CHLORIDE, SODIUM LACTATE AND CALCIUM CHLORIDE: 600; 310; 30; 20 INJECTION, SOLUTION INTRAVENOUS at 04:05

## 2024-09-25 RX ADMIN — ONDANSETRON 4 MG: 2 INJECTION INTRAMUSCULAR; INTRAVENOUS at 12:56

## 2024-09-25 RX ADMIN — TACROLIMUS 3 MG: 1 CAPSULE ORAL at 20:36

## 2024-09-25 RX ADMIN — SODIUM CHLORIDE, POTASSIUM CHLORIDE, SODIUM LACTATE AND CALCIUM CHLORIDE: 600; 310; 30; 20 INJECTION, SOLUTION INTRAVENOUS at 12:06

## 2024-09-25 RX ADMIN — PROPOFOL 140 MG: 10 INJECTION, EMULSION INTRAVENOUS at 12:10

## 2024-09-25 RX ADMIN — ROCURONIUM BROMIDE 50 MG: 50 INJECTION, SOLUTION INTRAVENOUS at 12:10

## 2024-09-25 RX ADMIN — ACYCLOVIR 400 MG: 400 TABLET ORAL at 20:36

## 2024-09-25 RX ADMIN — Medication 2 CAPSULE: at 09:02

## 2024-09-25 RX ADMIN — CEFAZOLIN 1 G: 1 INJECTION, POWDER, FOR SOLUTION INTRAMUSCULAR; INTRAVENOUS at 21:07

## 2024-09-25 RX ADMIN — SUGAMMADEX 200 MG: 100 INJECTION, SOLUTION INTRAVENOUS at 13:05

## 2024-09-25 RX ADMIN — FLUDROCORTISONE ACETATE 0.1 MG: 0.1 TABLET ORAL at 09:02

## 2024-09-25 RX ADMIN — PHENYLEPHRINE HYDROCHLORIDE 0.4 MCG/KG/MIN: 10 INJECTION INTRAVENOUS at 12:23

## 2024-09-25 RX ADMIN — BUPIVACAINE HYDROCHLORIDE 20 ML: 5 INJECTION, SOLUTION EPIDURAL; INTRACAUDAL at 11:35

## 2024-09-25 RX ADMIN — DAPSONE 50 MG: 25 TABLET ORAL at 09:02

## 2024-09-25 RX ADMIN — CARVEDILOL 6.25 MG: 3.12 TABLET, FILM COATED ORAL at 09:01

## 2024-09-25 RX ADMIN — ACETAMINOPHEN 650 MG: 325 TABLET ORAL at 09:18

## 2024-09-25 RX ADMIN — ROSUVASTATIN 20 MG: 10 TABLET, FILM COATED ORAL at 09:01

## 2024-09-25 RX ADMIN — TRAMADOL HYDROCHLORIDE 50 MG: 50 TABLET, COATED ORAL at 04:11

## 2024-09-25 RX ADMIN — FENTANYL CITRATE 25 MCG: 50 INJECTION, SOLUTION INTRAMUSCULAR; INTRAVENOUS at 11:26

## 2024-09-25 RX ADMIN — MAGNESIUM SULFATE HEPTAHYDRATE 4 G: 80 INJECTION, SOLUTION INTRAVENOUS at 11:21

## 2024-09-25 RX ADMIN — CARVEDILOL 6.25 MG: 3.12 TABLET, FILM COATED ORAL at 18:13

## 2024-09-25 RX ADMIN — SODIUM CHLORIDE, POTASSIUM CHLORIDE, SODIUM LACTATE AND CALCIUM CHLORIDE: 600; 310; 30; 20 INJECTION, SOLUTION INTRAVENOUS at 18:57

## 2024-09-25 RX ADMIN — MIDAZOLAM HYDROCHLORIDE 1 MG: 1 INJECTION, SOLUTION INTRAMUSCULAR; INTRAVENOUS at 11:26

## 2024-09-25 RX ADMIN — ACETAMINOPHEN 975 MG: 325 TABLET ORAL at 16:47

## 2024-09-25 RX ADMIN — ACYCLOVIR 400 MG: 400 TABLET ORAL at 09:02

## 2024-09-25 RX ADMIN — PREDNISONE 2.5 MG: 2.5 TABLET ORAL at 20:35

## 2024-09-25 RX ADMIN — THIAMINE HCL TAB 100 MG 100 MG: 100 TAB at 09:03

## 2024-09-25 RX ADMIN — PHENYLEPHRINE HYDROCHLORIDE 200 MCG: 10 INJECTION INTRAVENOUS at 12:15

## 2024-09-25 RX ADMIN — TACROLIMUS 3 MG: 1 CAPSULE ORAL at 09:02

## 2024-09-25 RX ADMIN — SENNOSIDES AND DOCUSATE SODIUM 1 TABLET: 8.6; 5 TABLET ORAL at 20:33

## 2024-09-25 RX ADMIN — MONTELUKAST 10 MG: 10 TABLET, FILM COATED ORAL at 20:34

## 2024-09-25 ASSESSMENT — ACTIVITIES OF DAILY LIVING (ADL)
ADLS_ACUITY_SCORE: 47

## 2024-09-25 ASSESSMENT — COPD QUESTIONNAIRES: COPD: 1

## 2024-09-25 NOTE — PROGRESS NOTES
VASCULAR SURGERY PROGRESS NOTE    SUBJECTIVE:  Aubrey is doing okay this morning. He did not sleep well. He denies pain in his R BKA stump. Endorses ongoing pain in L foot. Denies nausea, vomiting, fever, and chills.     OBJECTIVE:  BP (!) 152/72 (BP Location: Left arm, Cuff Size: Adult Regular)   Pulse 83   Temp 98.5  F (36.9  C) (Oral)   Resp 22   Wt 66.6 kg (146 lb 13.2 oz)   SpO2 95%   BMI 23.00 kg/m        Intake/Output Summary (Last 24 hours) at 9/25/2024 0718  Last data filed at 9/25/2024 0400  Gross per 24 hour   Intake 2685.68 ml   Output 1075 ml   Net 1610.68 ml       Physical exam:  General: Alert, oriented, no acute distress, resting comfortably in bed  Resp: Non-labored breathing on room air  Extremities: R BKA, incision healing appropriately, c/d/i.  LLE with chronic wounds to the toes including dry gangrene of the 5th digit, left foot cool and mottled, palpable pulse in L popliteal artery.      Labs:  ROUTINE IP LABS (Last four results)  BMP  Recent Labs   Lab 09/24/24 2022 09/24/24  1703 09/24/24  1436 09/24/24  0836 09/23/24  0644 09/23/24  0334 09/21/24  0215 09/20/24 2024 09/20/24  1032 09/20/24  0657   NA  --   --   --   --   --  134*  --  142  --  139   POTASSIUM  --   --   --   --   --  3.8  --  4.0  --  4.0   CHLORIDE  --   --   --   --   --  103  --  107  --  102   ERIN  --   --   --   --   --  7.4*  --  7.5*  --  8.3*   CO2  --   --   --   --   --  22  --  25  --  25   BUN  --   --   --   --   --  28.1*  --  26.6*  --  28.5*   CR  --   --   --   --   --  0.91  --  0.89  --  0.83   * 142* 202* 99   < > 113*   < > 113*   < > 80    < > = values in this interval not displayed.     CBC  Recent Labs   Lab 09/25/24  0436 09/24/24  0425 09/23/24  0334 09/22/24  1506   WBC 9.0 8.7 8.2 9.1   RBC 2.42* 2.40* 2.32* 2.38*   HGB 7.7* 7.5* 7.4* 7.5*   HCT 23.9* 24.1* 23.6* 25.1*   MCV 99 100 102* 106*   MCH 31.8 31.3 31.9 31.5   MCHC 32.2 31.1* 31.4* 29.9*   RDW 15.2* 15.9* 16.4* 16.9*   PLT  150 137* 124* 124*     INR  Recent Labs   Lab 09/22/24  1506 09/22/24  0621 09/22/24  0026 09/21/24  1752   INR 2.50* 1.54* 1.56* 1.45*       Imaging:   No new imaging to review.    ASSESSMENT/PLAN:  This is a 69 year old male with new rest pain in LL follow LLE angiogram, now with distal popliteal and AT occlusions. Found to have symptomatic segmental and subsegmental PE. Discussed with patients hematologist who recommended transitioning from argatroban to fondaparinux for procedure. Plans for OR today with vascular surgery for L BKA. R BKA dressing changed this morning, healing appropriately, plan to remove staples while under sedation for L BKA. Discussing with anesthesia possibility of block with fondaparinux.     -hold argatroban starting at 0900  -start fondaparinux preoperatively (2.5 mg subcutaneous)  -OR this afternoon    This patient was seen and discussed with vascular fellow, Dr. Steele, and staff, Dr. Sneed.    Jozef Phillips, MS4      Addendum 7:50 AM 09/25/24  Palpable L femoral and popliteal pulse. Plan for L BKA today. Stop argatroban gtt at 0900. Check PTT at 0930. Repeat q30min until normal. Will prophylactic fondaparinux in pre-op (2.5 mg SC). Patient marked.   R BKA healing well. Will remove staples in OR today

## 2024-09-25 NOTE — PROGRESS NOTES
Patient seen this afternoon. Resting comfortably, though still with left foot rest pain. Left foot remains cool, mottled. Nonlabored breathing on 2 L nasal cannula.    We discussed plans for left below-knee amputation tomorrow.     Reviewed case with patient's hematologist, Dr. Cogan.  Distant history of HIT, heterozygote for prothrombin gene mutation, and now incidental and asymptomatic segmental and subsegmental PE. Dr. Cogan recommends stopping argatroban gtt tomorrow morning and initiating prophylactic dosing of fondaparinux starting in pre-op, and continuing prophylactic fondaparinux until safe to resume therapeutic argatroban.    I will discuss this with the anesthesia team tomorrow morning. If anesthesia is still comfortable with performing a regional block, we will tentatively plan to stop the argatroban gtt at 0900.  Repeat PTT q30min until normal, and will plan for prophylactic fondaparinux in pre-op  (2.5 mg subcutaneous).    Mario Steele MD

## 2024-09-25 NOTE — PLAN OF CARE
Goal Outcome Evaluation:      Plan of Care Reviewed With: patient    Overall Patient Progress: no changeOverall Patient Progress: no change    Outcome Evaluation: Argatroban infusing; waiting on L BKA amputation.

## 2024-09-25 NOTE — OP NOTE
VASCULAR SURGERY OPERATIVE REPORT        LOCATION:    [facility]    Aubrey Duncan   Medical Record #:  [id number]  YOB: 1955  Age:  69 year old     Date of Service: September 25, 2024    PRIMARY CARE PROVIDER: Hoa Olivarez      Procedure: left below the knee amputation completion    Anesthesia:    MAC with block    Preprocedure diagnosis: Chronic limb threatening ischemia involving left leg, nonsalvageable from revascularization standpoint.  Rest pain  Postprocedural diagnosis: Same    Surgeon: Grace Sneed MD  Assistant: Mario Steele, vascular surgery resident PGY 5                     Svetlana Escobedo PA-C    The assistance of Svetlana Escobedo PA-C, was required in this case due to extreme complexity of this operation.  Svetlana Escobedo PA-C assisted by performing and providing most of the steps of this operation including tissue dissection, vascular anastomosis, and wound closure. Svetlana's assistance was also necessary because  a fellow/resident was not fully qualified to assist with all steps of the procedure.      Findings: Marginal bleeding is noted below the knee.  Muscle compartments are healthy.  The skin quality is very poor.  Significant amount of edema present.    Estimated blood loss: 150 cc    Specimens: Left leg    Brief history: 69-year-old male with a history of bilateral lower extremity chronic limb threatening ischemia nonsalvageable and not amenable for endovascular or open intervention.  Patient underwent right below-knee amputation few weeks ago.  Today he is scheduled for left below-knee amputation given the fact that most recent angiogram was unsuccessful.    Operative details:     The procedure was performed under general anesthesia.  The patient was placed supine.  The left lower extremity was prepped and draped in usual sterile fashion.  An occlusive dressing was applied to the top of the residual limp.  A tourniquet was applied to the thigh as well,  and the cuff pressure was raised to 300 mmHg.  Anterior and posterior skin incisions were outlined with a marking pen.  Timeout was performed.  The anterior skin incision was made approximately 12 cm below the tibial tuberosity and extended medially and laterally towards the edges of the gastrocnemius muscle.  The skin incision was then extended distally on either side parallel to the tibia for approximately 15 cm creating a posterior flap.  The skin and subcutaneous tissue were incised using electrocautery, down to the fascia.  The greater and short saphenous vein were ligated using 3-0 silk.  The fascia and the muscles were then divided with the electrocautery at the level of the anterior skin incision.  The muscles in the anterior and lateral compartment were divided, exposing the anterior tibial vessels which were ligated and divided as well.  The interosseous membrane was then divided bluntly.  The tibial periosteum was incised circumferentially with electrocautery at the same level of the skin and muscle division.  Using a periosteal elevator, the tibial periosteum was stripped proximally for about 2 cm.  The tibia was then transected with electric saw approximate 2 cm proximally to the skin incision.  The fibula was then exposed and transected using a bone cutter.  The amputation was then completed with the amputation knife, transecting the soleus muscle and the gastrocnemius muscle at the same level as the posterior flap.  Hemostasis was achieved using several stick ties 3-0 silk.  Sharp bony edges were then filed, eliminating any bone prominences over the anterior aspect of the tibia the fascia of the posterior and anterior muscle flaps were approximated using interrupted 0 Vicryl sutures.  The skin was approximated using skin staples.  Dressing was applied using 4 x 4 gauze, Kerlix, and Ace bandage.  Patient tolerated procedure well.  Patient was extubated and transferred to postoperative care unit in  stable condition.    Grace Sneed MD, RPVI  Vascular and Endovascular surgery

## 2024-09-25 NOTE — PLAN OF CARE
Surgery completed today. On 3L oxy mask. No complaints of pain at this time. Cardiac monitor reading SR. Limb guard on.

## 2024-09-25 NOTE — ANESTHESIA POSTPROCEDURE EVALUATION
Patient: Aubrey Duncan    Procedure: Procedure(s):  AMPUTATION, BELOW KNEE       Anesthesia Type:  General    Note:  Disposition: Outpatient   Postop Pain Control: Uneventful            Sign Out: Well controlled pain   PONV: No   Neuro/Psych: Uneventful            Sign Out: Acceptable/Baseline neuro status   Airway/Respiratory: Uneventful            Sign Out: Acceptable/Baseline resp. status   CV/Hemodynamics: Uneventful            Sign Out: Acceptable CV status; No obvious hypovolemia; No obvious fluid overload   Other NRE: NONE   DID A NON-ROUTINE EVENT OCCUR? No    Event details/Postop Comments:  Patient recovering comfortably.        Last vitals:  Vitals Value Taken Time   BP 98/55 09/25/24 1345   Temp 36.2  C (97.1  F) 09/25/24 1319   Pulse 74 09/25/24 1350   Resp 18 09/25/24 1350   SpO2 96 % 09/25/24 1350   Vitals shown include unfiled device data.    Electronically Signed By: Cornelius Olivarez DO  September 25, 2024  1:55 PM

## 2024-09-25 NOTE — PROGRESS NOTES
Post Op Check    09/25/2024    Aubrey Duncan is a 69 year old male with h/o rest pain in LLE following LLE angiogram, previous R BKA now POD#0 s/p L BKA.    Pt reports pain is well controlled. Denies SOB, chest pain, or dizziness.     /57   Pulse 72   Temp 97.9  F (36.6  C) (Axillary)   Resp (!) 36   Wt 66.6 kg (146 lb 13.2 oz)   SpO2 96%   BMI 23.00 kg/m      Gen: A&O x3, NAD, sleeping on arrival, flat affect  Chest: breathing comfortably on room air  LLE: s/p BKA. Dressing c/d/i. Able to move left knee and hip. Placed in stump protector.  RLE: s/p BKA. Wound healing appropriately, staples removed. Intact sensation and strength.    A/P: No acute post-op issues. Staples removed from R BKA wound, redressed with steristrips and kerlix. Continue plan of care per primary team. Argatroban stopped. Please call with any questions.    Patient seen and discussed with vascular fellow, Dr. Steele, and staff, Dr. Celeste.    Jozef Phillips, MS4    Addendum:  I saw the patient with the medical student and agree with the note.  Recovering well POD#0 s/p L BKA. Dressing c/d/I. R BKA staples removed and steristrips applied.  We had planned to give prophylactic fondaparinux in pre-op, however PTT still elevated and was 73 when last checked this afternoon. Repeat PTT at 2300 and if < 40 can give 2.5 mg subQ fondaparinux tonight, otherwise will start tomorrow and transition to argatroban gtt after dressing change on POD#2.    Mario Steele MD
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
## 2024-09-25 NOTE — ANESTHESIA CARE TRANSFER NOTE
Patient: Aubrey Duncan    Procedure: Procedure(s):  AMPUTATION, BELOW KNEE       Diagnosis: Pain of left lower leg [M79.662]  Arterial occlusion, lower extremity (H24) [I70.209]  Diagnosis Additional Information: No value filed.    Anesthesia Type:   General     Note:    Oropharynx: oropharynx clear of all foreign objects and spontaneously breathing  Level of Consciousness: drowsy  Oxygen Supplementation: face mask  Level of Supplemental Oxygen (L/min / FiO2): 8  Independent Airway: airway patency satisfactory and stable  Dentition: dentition unchanged  Vital Signs Stable: post-procedure vital signs reviewed and stable  Report to RN Given: handoff report given  Patient transferred to: PACU    Handoff Report: Identifed the Patient, Identified the Reponsible Provider, Reviewed the pertinent medical history, Discussed the surgical course, Reviewed Intra-OP anesthesia mangement and issues during anesthesia, Set expectations for post-procedure period and Allowed opportunity for questions and acknowledgement of understanding      Vitals:  Vitals Value Taken Time   /56 09/25/24 1320   Temp 31.9  C (89.42  F) 09/25/24 1319   Pulse 74 09/25/24 1322   Resp 20 09/25/24 1322   SpO2 94 % 09/25/24 1322   Vitals shown include unfiled device data.    Electronically Signed By: BRANDI Lee CRNA  September 25, 2024  1:23 PM

## 2024-09-25 NOTE — PROGRESS NOTES
[facility] ACUTE INPATIENT PAIN SERVICE    [facility], [facility], [facility], [facility], [place]   PAIN PROGRESS      Assessment/Plan:  Aubrey Duncan is a 69 year old male who was admitted on 9/20/2024.  I was asked by Vinnie Nelson MD. Past medical history of  alpha-1 antitrypsin deficiency, prior HITS, type 2 diabetes, bilateral chronic limb-threatening ischemia status post recent right BKA, hypertension and GERD.    Admitted on 9/20/2024 due to worsening left foot and leg pain and was found to have new left acute distal popliteal artery and anterior tibial artery occlusion.      CTA revealed extensive occlusion of both deep and superficial arteries in the left lower extremity. Vascular surgery service performed balloon angioplasty of the left anterior tibial artery on 9/21/2024.  Notable operative findings include moderate stenosis of the distal left common femoral artery, new long-segment occlusion of the left superficial femoral artery, reconstitution of the left popliteal artery, and occluded left posterior tibial artery.       He is scheduled to undergo a left BKA later today. He completed a right BKA a few weeks ago. He has been able to tolerate the oral Tramadol with less side effects compared to the oxycodone and dilaudid he has been on in the past.     shows prescriptions of Hydromorphone and Gabapentin      Opioids used in the past 24h:  *(1) 50mg Tramadol  *(2) 25mg Tramadol   MME is 20      PLAN:  Acute on chronic left lower extremity pain.    Multimodal Medication Therapy:   Adjuvants:   - ASA 81mg daily  -  APAP 650mg q6h prn  - Topical lidocaine  Opioids:  - Tramadol IR 25-50mg q6h prn - first line  - IV Dilaudid 0.5mg q2h prn - second line  Non-medication interventions- Ice  Constipation Prophylaxis- Senna-S  Follow up /Discharge Recommendations - We recommend prescribing the following at the time of discharge: Rx of Tramadol will be given.            Subjective:  Aubrey is seen today in his  bed alert and oriented in no acute distress. Reports his pain is currently a 7/10 located in his left lower limb. Trial of Tramadol has been beneficial for his breakthrough pain and he has not used any IV hydromorphone. Scheduled to undergo a left BKA later today.     Denies concerns with nausea, vomiting, constipation.      Reviewed continuing with current plan, all questions answered. His wife is bedside with him today and contributes to his overall care.             History   Drug Use No         Tobacco Use      Smoking status: Former        Packs/day: 0.00        Years: 2.0 packs/day for 15.0 years (30.0 ttl pk-yrs)        Types: Cigarettes        Start date: 1971        Quit date: 1986        Years since quittin.7        Passive exposure: Past      Smokeless tobacco: Never        Objective:  Vital signs in last 24 hours:  B/P: 152/72, T: 98.5, P: 83, R: 22   Blood pressure (!) 152/72, pulse 83, temperature 98.5  F (36.9  C), temperature source Oral, resp. rate 22, weight 66.6 kg (146 lb 13.2 oz), SpO2 95%.        Review of Systems:   As per subjective, all others negative.    Physical Exam    General: in no apparent distress and non-toxic   HEENT: Head normocephalic atraumatic, oral mucosa moist. Sclerae anicteric  Resp: Non-labored breathing  GI: Normoactive bowel sounds  Skin: No rashes or lesions  Extremities: Diminished sensation of left foot. Left toe necrotic ulcers.  Psych: Normal affect, mood euthymic  Neuro: Grossly normal             Imaging:  Personally Reviewed.    Results for orders placed or performed during the hospital encounter of 24   CTA Abdomen Pelvis Runoff w Contrast   Result Value Ref Range    Radiologist flags New diagnosis of pulmonary embolism (AA)     Impression    IMPRESSION:  1.  Acute segmental and subsegmental pulmonary emboli in the left lower lobe.    2.  Right lower extremity: Extensive atherosclerotic calcifications. The common iliac, external iliac,  internal iliac, and common femoral arteries are patent. The deep femoral artery is occluded. The superficial femoral artery is patent. The popliteal   artery is occluded. The proximal anterior tibial artery is occluded. The tibioperoneal trunk is occluded. There is reconstitution of the posterior tibial and peroneal arteries via collaterals. Below the knee amputation.    3.   Left lower extremity: The left common iliac and left external iliac arteries are patent. The left internal iliac artery is occluded with distal reconstitution. The left common femoral artery is patent. The left deep femoral artery is patent. The   left superficial femoral artery is occluded. There is reconstitution of the popliteal artery via collateral flow. The distal popliteal artery is occluded, new from recent angiogram. Evaluation of the anterior tibial, posterior tibial, and peroneal   arteries is limited due to extensive atherosclerotic calcifications. The tibioperoneal trunk is likely occluded. The anterior tibial artery appears occluded, new from recent angiogram. The peroneal artery is occluded proximally, new from recent   angiogram, with distal reconstitution, with flow to the level of the foot. The proximal posterior tibial artery is occluded with distal reconstitution, with flow to the level of the foot.    4.  Since April 2024, slight increase in size of a large cystic mass in the pancreas.      [Critical Result: New diagnosis of pulmonary embolism]    Finding was identified on 9/21/2024 12:10 AM CDT.     Dr. Venegas was contacted by me on 9/21/2024 12:28 AM CDT and verbalized understanding of the critical result.      IR Lower Extremity Angiogram Left    Impression    IMPRESSION:  1.  Calcified mild focal left common iliac artery stenosis and moderate focal calcified stenosis left external iliac artery, not treated at this setting. Chronically occluded left internal iliac artery.  2.  LEFT LEG: Severe calcified ostial stenosis  left PFA treated with 5 mm drug-coated balloon angioplasty. Acute occlusion of chronically and severely diseased left SFA with new occlusive diffuse dissection of the SFA and popliteal artery segment.   Successful stent reconstruction of the entire SFA and above-knee popliteal artery segment. In-line flow reestablished to the knee. Irregular dissection and/or thrombus in the distal popliteal artery, TP trunk and suspected microembolic occlusions with   chronically and severely diseased tibial vessels prompted thrombolytic infusion. Patient's foot remain severely ischemic below the ankle at this time. Plan for overnight thrombolytic infusion with follow-up angiographic assessment the following day.   Findings and plan discussed with patient's wife Kyung and Dr. Smiley Jay of vascular surgery.     IR Angiogram through catheter (arterial)    Impression    IMPRESSION:    1.  Left leg: There is in-line flow to the knee. Chronic severe trifurcation occlusive disease, not salvageable by endovascular means. Improved collateral perfusion left calf since previous day. Effectively no flow beyond the proximal foot. Lytic   infusion discontinued. Vascular surgery aware of findings and plan.          Lab Results:  Personally Reviewed.   Last Comprehensive Metabolic Panel:  Sodium   Date Value Ref Range Status   09/23/2024 134 (L) 135 - 145 mmol/L Final   06/03/2021 140 134 - 144 mmol/L Final     Potassium   Date Value Ref Range Status   09/23/2024 3.8 3.4 - 5.3 mmol/L Final   10/26/2022 4.5 3.5 - 5.1 mmol/L Final   06/03/2021 4.6 3.5 - 5.1 mmol/L Final     Chloride   Date Value Ref Range Status   09/23/2024 103 98 - 107 mmol/L Final   06/03/2021 105 98 - 107 mmol/L Final     Chloride (External)   Date Value Ref Range Status   11/09/2022 110 (H) 98 - 107 mmol/L Final     Carbon Dioxide   Date Value Ref Range Status   06/03/2021 26 21 - 31 mmol/L Final     Carbon Dioxide (CO2)   Date Value Ref Range Status   09/23/2024 22 22 - 29  "mmol/L Final   10/26/2022 28 21 - 31 mmol/L Final     Anion Gap   Date Value Ref Range Status   09/23/2024 9 7 - 15 mmol/L Final   10/26/2022 6 3 - 14 mmol/L Final   06/03/2021 9 3 - 14 mmol/L Final     Glucose   Date Value Ref Range Status   10/26/2022 92 70 - 105 mg/dL Final   06/03/2021 96 70 - 105 mg/dL Final     GLUCOSE BY METER POCT   Date Value Ref Range Status   09/24/2024 118 (H) 70 - 99 mg/dL Final     Urea Nitrogen   Date Value Ref Range Status   09/23/2024 28.1 (H) 8.0 - 23.0 mg/dL Final   10/26/2022 36 (H) 7 - 25 mg/dL Final   06/03/2021 38 (H) 7 - 25 mg/dL Final     Creatinine   Date Value Ref Range Status   09/23/2024 0.91 0.67 - 1.17 mg/dL Final   06/03/2021 1.29 0.70 - 1.30 mg/dL Final     GFR Estimate   Date Value Ref Range Status   09/23/2024 90 >60 mL/min/1.73m2 Final     Comment:     eGFR calculated using 2021 CKD-EPI equation.   06/03/2021 56 (L) >60 mL/min/[1.73_m2] Final     GFR, ESTIMATED POCT   Date Value Ref Range Status   07/21/2022 59 (L) >60 mL/min/1.73m2 Final     Calcium   Date Value Ref Range Status   09/23/2024 7.4 (L) 8.8 - 10.4 mg/dL Final     Comment:     Reference intervals for this test were updated on 7/16/2024 to reflect our healthy population more accurately. There may be differences in the flagging of prior results with similar values performed with this method. Those prior results can be interpreted in the context of the updated reference intervals.   06/03/2021 8.9 8.6 - 10.3 mg/dL Final        UA: No results found for: \"UAMP\", \"UBARB\", \"BENZODIAZEUR\", \"UCANN\", \"UCOC\", \"OPIT\", \"UPCP\"           Please see A&P for additional details of medical decision making.      Isma Irving PA-C  Acute Care Pain Management  Team  Hours of pain coverage Mon-Fri 8-1600, afterhours please call the house officer   [facility] ([place], [place], [place], [place])   Page via marker.to web console -Click for Vocera           "

## 2024-09-25 NOTE — PROGRESS NOTES
"Care Management Follow Up    Length of Stay (days): 4    Expected Discharge Date: 09/23/2024    Anticipated Discharge Plan:   TCU     Transportation: Anticipate medical transport    PT Recommendations:  Pt having Left BKA surgery today, currently on strict bedrest   OT Recommendations:          Barriers to Discharge: medical stability/ Vascular surgery following. Pain team following. Pt will need TCU placement, need to wait for therapy recs prior to submitting any TCU referrals.       Prior Living Situation: \"Patient recently in hospital 8/25/24-9/4/24; [facility] Prior to hospitalizations, patient reports living in his house with spouse, independent with ADLs/IADLs and ambulated with walker.  He [facility] Spouse is primary family contact. Transportation at discharge TBD. Wife canceled bed hold 9/24, [place].\"        Discussed  Partnership in Safe Discharge Planning  document with patient/family: No     Handoff Completed: Not at this time     Patient/Spokesperson Updated: No    Additional Information:    Plans for pt to go to OR today for Left BKA. Pt currently on strict bedrest.     Spouse Kyung/ and pt gave writer choices for TCU yesterday. They gave up bed hold at [facility] in [place], with hopes they can find something [place] north. Pt's wife has been staying at daughter's in [place] while pt was in TCU prior.         They requested in order  1.[facility] ( [facility])   2. [facility] [place]   3. [facility] in [place]         TCU referrals not sent until therapy recs provided, for referral purposes, and insurance coverage.       Next Steps: Cm to send TCU referrals once therapy recs are in. Cm will continue to follow plan of care,review recommendations, and assist with any discharge needs anticipated.     Sanaz Alarcon RN      "

## 2024-09-25 NOTE — ANESTHESIA PROCEDURE NOTES
Airway       Patient location during procedure: OR       Procedure Start/Stop Times: 9/25/2024 12:12 PM  Staff -        CRNA: Alberto Mccauley APRN CRNA       Performed By: CRNA  Consent for Airway        Urgency: elective  Indications and Patient Condition       Indications for airway management: debra-procedural       Induction type:RSI       Mask difficulty assessment: 1 - vent by mask    Final Airway Details       Final airway type: endotracheal airway       Successful airway: ETT - single  Endotracheal Airway Details        ETT size (mm): 7.5       Cuffed: yes       Successful intubation technique: video laryngoscopy       VL Blade Size: Glidescope 4       Grade View of Cords: 1       Adjucts: stylet       Position: Right       Measured from: lips       Secured at (cm): 21       Bite block used: None    Post intubation assessment        Placement verified by: capnometry, equal breath sounds and chest rise        Number of attempts at approach: 1       Number of other approaches attempted: 0       Secured with: tape       Ease of procedure: easy       Dentition: Intact and Unchanged    Medication(s) Administered   Medication Administration Time: 9/25/2024 12:12 PM

## 2024-09-25 NOTE — ANESTHESIA PROCEDURE NOTES
"Adductor canal Procedure Note    Pre-Procedure   Staff -        Anesthesiologist:  Cornelius Olivarez DO       Performed By: anesthesiologist       Location: pre-op       Procedure Start/Stop Times: 9/25/2024 11:35 AM and 9/25/2024 11:40 AM       Pre-Anesthestic Checklist: patient identified, IV checked, site marked, risks and benefits discussed, informed consent, monitors and equipment checked, pre-op evaluation, at physician/surgeon's request and post-op pain management  Timeout:       Correct Patient: Yes        Correct Procedure: Yes        Correct Site: Yes        Correct Position: Yes        Correct Laterality: Yes        Site Marked: Yes  Procedure Documentation  Procedure: Adductor canal       Laterality: left       Patient Position: supine       Needle Type: insulated       Needle Gauge: 21.        Needle Length (millimeters): 100        Ultrasound guided       1. Ultrasound was used to identify targeted nerve, plexus, vascular marker, or fascial plane and place a needle adjacent to it in real-time.       2. Ultrasound was used to visualize the spread of anesthetic in close proximity to the above referenced structure.       3. A permanent image is entered into the patient's record.    Assessment/Narrative         The placement was negative for: blood aspirated, painful injection and site bleeding       Paresthesias: No.       Bolus given via needle..        Secured via.        Insertion/Infusion Method: Single Shot       Complications: none    Medication(s) Administered   Bupivacaine 0.5% PF (Infiltration) - Infiltration   10 mL - 9/25/2024 11:35:00 AM  Medication Administration Time: 9/25/2024 11:35 AM      FOR [facility] ([facility] [place] ONLY:   Pain Team Contact information: please page the Pain Team Via Consumer Agent Portal (CAP). Search \"Pain\". During daytime hours, please page the attending first. At night please page the resident first.      "

## 2024-09-25 NOTE — PROGRESS NOTES
Just coming onto shift. Argatroban drip is infusing at 0.25, but do not have an order for it. Page sent to Dr. Sneed at [phone]. Waiting on call back.

## 2024-09-26 ENCOUNTER — APPOINTMENT (OUTPATIENT)
Dept: PHYSICAL THERAPY | Facility: HOSPITAL | Age: 69
DRG: 239 | End: 2024-09-26
Attending: PHYSICIAN ASSISTANT
Payer: MEDICARE

## 2024-09-26 ENCOUNTER — APPOINTMENT (OUTPATIENT)
Dept: RADIOLOGY | Facility: HOSPITAL | Age: 69
DRG: 239 | End: 2024-09-26
Attending: INTERNAL MEDICINE
Payer: MEDICARE

## 2024-09-26 LAB
ABO/RH(D): NORMAL
ANION GAP SERPL CALCULATED.3IONS-SCNC: 8 MMOL/L (ref 7–15)
ANTIBODY SCREEN: NEGATIVE
APTT PPP: 41 SECONDS (ref 22–38)
BLD PROD TYP BPU: NORMAL
BLOOD COMPONENT TYPE: NORMAL
BUN SERPL-MCNC: 20.7 MG/DL (ref 8–23)
CALCIUM SERPL-MCNC: 7.3 MG/DL (ref 8.8–10.4)
CHLORIDE SERPL-SCNC: 101 MMOL/L (ref 98–107)
CO COMPONENTS: NORMAL
CODING SYSTEM: NORMAL
CREAT SERPL-MCNC: 0.67 MG/DL (ref 0.67–1.17)
CROSSMATCH: NORMAL
EGFRCR SERPLBLD CKD-EPI 2021: >90 ML/MIN/1.73M2
ERYTHROCYTE [DISTWIDTH] IN BLOOD BY AUTOMATED COUNT: 15.5 % (ref 10–15)
GLUCOSE BLDC GLUCOMTR-MCNC: 105 MG/DL (ref 70–99)
GLUCOSE BLDC GLUCOMTR-MCNC: 114 MG/DL (ref 70–99)
GLUCOSE BLDC GLUCOMTR-MCNC: 145 MG/DL (ref 70–99)
GLUCOSE BLDC GLUCOMTR-MCNC: 80 MG/DL (ref 70–99)
GLUCOSE SERPL-MCNC: 94 MG/DL (ref 70–99)
GRAM/CULTURE INDICATED?: YES
HCO3 SERPL-SCNC: 25 MMOL/L (ref 22–29)
HCT VFR BLD AUTO: 21.8 % (ref 40–53)
HGB BLD-MCNC: 6.9 G/DL (ref 13.3–17.7)
HGB BLD-MCNC: 6.9 G/DL (ref 13.3–17.7)
HGB BLD-MCNC: 7.4 G/DL (ref 13.3–17.7)
HOLD SPECIMEN: NORMAL
ISSUE DATE AND TIME: NORMAL
ISSUE DATE AND TIME: NORMAL
LACTATE SERPL-SCNC: 1 MMOL/L (ref 0.7–2)
LACTATE SERPL-SCNC: 3.6 MMOL/L (ref 0.7–2)
MCH RBC QN AUTO: 31.4 PG (ref 26.5–33)
MCHC RBC AUTO-ENTMCNC: 31.7 G/DL (ref 31.5–36.5)
MCV RBC AUTO: 99 FL (ref 78–100)
OTHER UNITS TRANSFUSED: NORMAL
PLATELET # BLD AUTO: 169 10E3/UL (ref 150–450)
POST RXN CLERICAL CHECK: NORMAL
POST SPECIMEN APPEARANCE: NORMAL
POST-RXN ABO/RH: NORMAL
POST-RXN POLY DAT: NEGATIVE
POTASSIUM SERPL-SCNC: 4.2 MMOL/L (ref 3.4–5.3)
PRODUCT CODE: NORMAL
RBC # BLD AUTO: 2.2 10E6/UL (ref 4.4–5.9)
SODIUM SERPL-SCNC: 134 MMOL/L (ref 135–145)
SPECIMEN EXPIRATION DATE: NORMAL
SPECIMEN EXPIRATION DATE: NORMAL
UNIT ABO/RH: NORMAL
UNIT BLOOD TYPE TRANSFUSION REACTION: NORMAL
UNIT NUMBER: NORMAL
UNIT STATUS: NORMAL
UNIT TYPE ISBT: 6200
WBC # BLD AUTO: 6.7 10E3/UL (ref 4–11)

## 2024-09-26 PROCEDURE — 250N000013 HC RX MED GY IP 250 OP 250 PS 637: Performed by: INTERNAL MEDICINE

## 2024-09-26 PROCEDURE — 85730 THROMBOPLASTIN TIME PARTIAL: CPT | Performed by: STUDENT IN AN ORGANIZED HEALTH CARE EDUCATION/TRAINING PROGRAM

## 2024-09-26 PROCEDURE — 250N000013 HC RX MED GY IP 250 OP 250 PS 637: Performed by: PHYSICIAN ASSISTANT

## 2024-09-26 PROCEDURE — 36415 COLL VENOUS BLD VENIPUNCTURE: CPT | Performed by: INTERNAL MEDICINE

## 2024-09-26 PROCEDURE — 86900 BLOOD TYPING SEROLOGIC ABO: CPT | Performed by: INTERNAL MEDICINE

## 2024-09-26 PROCEDURE — P9016 RBC LEUKOCYTES REDUCED: HCPCS | Performed by: STUDENT IN AN ORGANIZED HEALTH CARE EDUCATION/TRAINING PROGRAM

## 2024-09-26 PROCEDURE — 258N000003 HC RX IP 258 OP 636: Performed by: INTERNAL MEDICINE

## 2024-09-26 PROCEDURE — 80048 BASIC METABOLIC PNL TOTAL CA: CPT | Performed by: STUDENT IN AN ORGANIZED HEALTH CARE EDUCATION/TRAINING PROGRAM

## 2024-09-26 PROCEDURE — 36415 COLL VENOUS BLD VENIPUNCTURE: CPT | Performed by: FAMILY MEDICINE

## 2024-09-26 PROCEDURE — 85018 HEMOGLOBIN: CPT | Performed by: FAMILY MEDICINE

## 2024-09-26 PROCEDURE — 258N000003 HC RX IP 258 OP 636: Performed by: STUDENT IN AN ORGANIZED HEALTH CARE EDUCATION/TRAINING PROGRAM

## 2024-09-26 PROCEDURE — 272N000451 HC KIT SHRLOCK 5FR POWER PICC DOUBLE LUMEN

## 2024-09-26 PROCEDURE — 97530 THERAPEUTIC ACTIVITIES: CPT | Mod: GP | Performed by: PHYSICAL THERAPIST

## 2024-09-26 PROCEDURE — 86901 BLOOD TYPING SEROLOGIC RH(D): CPT | Performed by: INTERNAL MEDICINE

## 2024-09-26 PROCEDURE — 250N000013 HC RX MED GY IP 250 OP 250 PS 637: Performed by: STUDENT IN AN ORGANIZED HEALTH CARE EDUCATION/TRAINING PROGRAM

## 2024-09-26 PROCEDURE — 86078 PHYS BLOOD BANK SERV REACTJ: CPT | Performed by: PATHOLOGY

## 2024-09-26 PROCEDURE — 36415 COLL VENOUS BLD VENIPUNCTURE: CPT | Performed by: STUDENT IN AN ORGANIZED HEALTH CARE EDUCATION/TRAINING PROGRAM

## 2024-09-26 PROCEDURE — 83605 ASSAY OF LACTIC ACID: CPT | Performed by: INTERNAL MEDICINE

## 2024-09-26 PROCEDURE — P9016 RBC LEUKOCYTES REDUCED: HCPCS | Performed by: INTERNAL MEDICINE

## 2024-09-26 PROCEDURE — 36569 INSJ PICC 5 YR+ W/O IMAGING: CPT

## 2024-09-26 PROCEDURE — 99233 SBSQ HOSP IP/OBS HIGH 50: CPT | Performed by: INTERNAL MEDICINE

## 2024-09-26 PROCEDURE — 97162 PT EVAL MOD COMPLEX 30 MIN: CPT | Mod: GP | Performed by: PHYSICAL THERAPIST

## 2024-09-26 PROCEDURE — 99232 SBSQ HOSP IP/OBS MODERATE 35: CPT | Performed by: PHYSICIAN ASSISTANT

## 2024-09-26 PROCEDURE — 85018 HEMOGLOBIN: CPT | Performed by: INTERNAL MEDICINE

## 2024-09-26 PROCEDURE — 210N000001 HC R&B IMCU HEART CARE

## 2024-09-26 PROCEDURE — 999N000065 XR CHEST PORT 1 VIEW

## 2024-09-26 PROCEDURE — 250N000009 HC RX 250: Performed by: INTERNAL MEDICINE

## 2024-09-26 PROCEDURE — 85027 COMPLETE CBC AUTOMATED: CPT | Performed by: STUDENT IN AN ORGANIZED HEALTH CARE EDUCATION/TRAINING PROGRAM

## 2024-09-26 PROCEDURE — 86923 COMPATIBILITY TEST ELECTRIC: CPT | Performed by: INTERNAL MEDICINE

## 2024-09-26 PROCEDURE — 250N000011 HC RX IP 250 OP 636: Performed by: STUDENT IN AN ORGANIZED HEALTH CARE EDUCATION/TRAINING PROGRAM

## 2024-09-26 PROCEDURE — 250N000012 HC RX MED GY IP 250 OP 636 PS 637: Performed by: STUDENT IN AN ORGANIZED HEALTH CARE EDUCATION/TRAINING PROGRAM

## 2024-09-26 PROCEDURE — 250N000011 HC RX IP 250 OP 636: Performed by: PHYSICIAN ASSISTANT

## 2024-09-26 RX ORDER — DIPHENHYDRAMINE HCL 25 MG
25 CAPSULE ORAL ONCE
Status: COMPLETED | OUTPATIENT
Start: 2024-09-26 | End: 2024-09-26

## 2024-09-26 RX ORDER — POLYETHYLENE GLYCOL 3350 17 G/17G
17 POWDER, FOR SOLUTION ORAL DAILY
Status: DISCONTINUED | OUTPATIENT
Start: 2024-09-26 | End: 2024-10-01 | Stop reason: HOSPADM

## 2024-09-26 RX ORDER — GABAPENTIN 100 MG/1
100 CAPSULE ORAL 3 TIMES DAILY
Status: DISCONTINUED | OUTPATIENT
Start: 2024-09-26 | End: 2024-09-26

## 2024-09-26 RX ORDER — ACETAMINOPHEN 325 MG/10.15ML
650 LIQUID ORAL ONCE
Status: COMPLETED | OUTPATIENT
Start: 2024-09-26 | End: 2024-09-26

## 2024-09-26 RX ORDER — FONDAPARINUX SODIUM 2.5 MG/.5ML
2.5 INJECTION SUBCUTANEOUS EVERY 24 HOURS
Status: DISCONTINUED | OUTPATIENT
Start: 2024-09-26 | End: 2024-09-27

## 2024-09-26 RX ORDER — AMOXICILLIN 250 MG
1 CAPSULE ORAL 2 TIMES DAILY
Status: DISCONTINUED | OUTPATIENT
Start: 2024-09-26 | End: 2024-10-01 | Stop reason: HOSPADM

## 2024-09-26 RX ORDER — DIPHENHYDRAMINE HYDROCHLORIDE 50 MG/ML
25 INJECTION INTRAMUSCULAR; INTRAVENOUS ONCE
Status: COMPLETED | OUTPATIENT
Start: 2024-09-26 | End: 2024-09-26

## 2024-09-26 RX ORDER — LIDOCAINE 40 MG/G
CREAM TOPICAL
Status: ACTIVE | OUTPATIENT
Start: 2024-09-26 | End: 2024-09-29

## 2024-09-26 RX ORDER — FUROSEMIDE 10 MG/ML
20 INJECTION INTRAMUSCULAR; INTRAVENOUS ONCE
Status: COMPLETED | OUTPATIENT
Start: 2024-09-26 | End: 2024-09-26

## 2024-09-26 RX ORDER — SODIUM CHLORIDE 9 MG/ML
INJECTION, SOLUTION INTRAVENOUS CONTINUOUS
Status: DISCONTINUED | OUTPATIENT
Start: 2024-09-26 | End: 2024-09-26

## 2024-09-26 RX ORDER — MULTIVITAMIN,THERAPEUTIC
1 TABLET ORAL DAILY
Status: DISCONTINUED | OUTPATIENT
Start: 2024-09-26 | End: 2024-10-01 | Stop reason: HOSPADM

## 2024-09-26 RX ORDER — AMOXICILLIN 250 MG
2 CAPSULE ORAL 2 TIMES DAILY
Status: DISCONTINUED | OUTPATIENT
Start: 2024-09-26 | End: 2024-09-27

## 2024-09-26 RX ADMIN — ACYCLOVIR 400 MG: 400 TABLET ORAL at 10:28

## 2024-09-26 RX ADMIN — ACYCLOVIR 400 MG: 400 TABLET ORAL at 20:21

## 2024-09-26 RX ADMIN — SODIUM CHLORIDE 500 ML: 9 INJECTION, SOLUTION INTRAVENOUS at 17:52

## 2024-09-26 RX ADMIN — Medication 60 ML: at 10:29

## 2024-09-26 RX ADMIN — PREDNISONE 2.5 MG: 2.5 TABLET ORAL at 20:21

## 2024-09-26 RX ADMIN — ACETAMINOPHEN 975 MG: 325 TABLET ORAL at 17:45

## 2024-09-26 RX ADMIN — PANTOPRAZOLE SODIUM 40 MG: 40 TABLET, DELAYED RELEASE ORAL at 08:52

## 2024-09-26 RX ADMIN — LOPERAMIDE HYDROCHLORIDE 2 MG: 2 CAPSULE ORAL at 18:03

## 2024-09-26 RX ADMIN — GABAPENTIN 100 MG: 100 CAPSULE ORAL at 10:27

## 2024-09-26 RX ADMIN — Medication 2 CAPSULE: at 20:21

## 2024-09-26 RX ADMIN — CARVEDILOL 6.25 MG: 3.12 TABLET, FILM COATED ORAL at 08:50

## 2024-09-26 RX ADMIN — TRAMADOL HYDROCHLORIDE 50 MG: 50 TABLET, COATED ORAL at 04:58

## 2024-09-26 RX ADMIN — Medication 2 CAPSULE: at 10:28

## 2024-09-26 RX ADMIN — LIDOCAINE HYDROCHLORIDE 2 ML: 10 INJECTION, SOLUTION EPIDURAL; INFILTRATION; INTRACAUDAL; PERINEURAL at 16:45

## 2024-09-26 RX ADMIN — CEFAZOLIN 1 G: 1 INJECTION, POWDER, FOR SOLUTION INTRAMUSCULAR; INTRAVENOUS at 04:49

## 2024-09-26 RX ADMIN — ACETAMINOPHEN 975 MG: 325 TABLET ORAL at 00:04

## 2024-09-26 RX ADMIN — PREDNISONE 5 MG: 5 TABLET ORAL at 08:52

## 2024-09-26 RX ADMIN — DIPHENHYDRAMINE HYDROCHLORIDE 25 MG: 25 CAPSULE ORAL at 17:45

## 2024-09-26 RX ADMIN — Medication: at 11:13

## 2024-09-26 RX ADMIN — DAPSONE 50 MG: 25 TABLET ORAL at 10:28

## 2024-09-26 RX ADMIN — ACETAMINOPHEN 975 MG: 325 TABLET ORAL at 08:50

## 2024-09-26 RX ADMIN — FLUTICASONE FUROATE AND VILANTEROL TRIFENATATE 1 PUFF: 200; 25 POWDER RESPIRATORY (INHALATION) at 08:54

## 2024-09-26 RX ADMIN — SODIUM CHLORIDE, POTASSIUM CHLORIDE, SODIUM LACTATE AND CALCIUM CHLORIDE: 600; 310; 30; 20 INJECTION, SOLUTION INTRAVENOUS at 06:25

## 2024-09-26 RX ADMIN — THIAMINE HCL TAB 100 MG 100 MG: 100 TAB at 08:53

## 2024-09-26 RX ADMIN — THERA TABS 1 TABLET: TAB at 10:27

## 2024-09-26 RX ADMIN — FUROSEMIDE 20 MG: 10 INJECTION, SOLUTION INTRAMUSCULAR; INTRAVENOUS at 10:27

## 2024-09-26 RX ADMIN — TACROLIMUS 3 MG: 1 CAPSULE ORAL at 20:21

## 2024-09-26 RX ADMIN — ASPIRIN 81 MG: 81 TABLET, COATED ORAL at 08:52

## 2024-09-26 RX ADMIN — LIDOCAINE HYDROCHLORIDE 2 ML: 10 INJECTION, SOLUTION EPIDURAL; INFILTRATION; INTRACAUDAL; PERINEURAL at 17:10

## 2024-09-26 RX ADMIN — CARVEDILOL 6.25 MG: 3.12 TABLET, FILM COATED ORAL at 17:45

## 2024-09-26 RX ADMIN — VENLAFAXINE HYDROCHLORIDE 37.5 MG: 37.5 CAPSULE, EXTENDED RELEASE ORAL at 08:53

## 2024-09-26 RX ADMIN — MONTELUKAST 10 MG: 10 TABLET, FILM COATED ORAL at 20:21

## 2024-09-26 RX ADMIN — FLUDROCORTISONE ACETATE 0.1 MG: 0.1 TABLET ORAL at 10:27

## 2024-09-26 RX ADMIN — FONDAPARINUX SODIUM 2.5 MG: 2.5 INJECTION, SOLUTION SUBCUTANEOUS at 06:23

## 2024-09-26 RX ADMIN — TACROLIMUS 3 MG: 1 CAPSULE ORAL at 10:27

## 2024-09-26 RX ADMIN — INSULIN GLARGINE 5 UNITS: 100 INJECTION, SOLUTION SUBCUTANEOUS at 08:53

## 2024-09-26 ASSESSMENT — ACTIVITIES OF DAILY LIVING (ADL)
ADLS_ACUITY_SCORE: 46
ADLS_ACUITY_SCORE: 47
ADLS_ACUITY_SCORE: 46
ADLS_ACUITY_SCORE: 46
ADLS_ACUITY_SCORE: 47
ADLS_ACUITY_SCORE: 46
ADLS_ACUITY_SCORE: 47
ADLS_ACUITY_SCORE: 47
ADLS_ACUITY_SCORE: 46
ADLS_ACUITY_SCORE: 47
ADLS_ACUITY_SCORE: 46
ADLS_ACUITY_SCORE: 46

## 2024-09-26 NOTE — PROGRESS NOTES
Laboratory Medicine and Pathology  Transfusion Medicine- Transfusion Reaction Investigation     Aubrey Duncan MRN# [id number]   YOB: 1955 Age: 69 year old       IMPRESSION  Unknown pathophysiology      Patient history of.  Patient received:  13 mL of RBCs    CHIEF COMPLAINT  Possible transfusion reaction.     SIGNS AND SYMPTOMS     Cardiovascular: blood pressure decrease  Other: dizziness/lightheadedness  Labs: Cultures pending                Final      (Charted flowsheet data)  Data   Blood Administration       View:  09/12/24 1314 to 09/26/24 1314 (14 Days) Sort by:  Time       A stopped transfusion has not been completed.         Not Started  PRBC: 1 aliquots              Available to Release  PRBC: 1 aliquots                               PRBC Transfuse red blood cells (unit), 2 Units (1 remaining)                           Completed  PRBC: 1 aliquots Suspected Reaction? yes      Date           End Product Transfused   HEMOGLOBIN HEMATOCRIT PLATELET COUNT Suspected Reaction?                             09/26/24 (1 aliquots) 12.5 mL (Pediatric) yes        1241 Lab   7.4 g/dL        1118 PRBC 12.5 mL     yes     0708 Lab   6.9 g/dL        0435 Lab   6.9 g/dL 21.8 % 169 10e3/uL                  09/25/24            0436 Lab   7.7 g/dL 23.9 % 150 10e3/uL                  09/24/24            0425 Lab   7.5 g/dL 24.1 % 137 10e3/uL                  09/23/24            0334 Lab   7.4 g/dL 23.6 % 124 10e3/uL                  09/22/24            1506 Lab   7.5 g/dL 25.1 % 124 10e3/uL      0621 Lab   7.7 g/dL 24.8 % 128 10e3/uL      0026 Lab   7.9 g/dL 25.2 % 136 10e3/uL                  09/21/24            1752 Lab   8.5 g/dL 27.2 % 156 10e3/uL                  09/20/24            2024 Lab   9.4 g/dL 30.5 % 213 10e3/uL      0657 Lab   11.9 g/dL 38.0 % 252 10e3/uL                                         Savanah Balderrama RN

## 2024-09-26 NOTE — PROGRESS NOTES
[facility] ACUTE INPATIENT PAIN SERVICE    [facility], [facility], [facility], [facility], [place]   PAIN PROGRESS      Assessment/Plan:  Aubrey Duncan is a 69 year old male who was admitted on 9/20/2024.  I was asked by Vinnie Nelson MD. Past medical history of  alpha-1 antitrypsin deficiency, prior HITS, type 2 diabetes, bilateral chronic limb-threatening ischemia status post recent right BKA, hypertension and GERD.    Admitted on 9/20/2024 due to worsening left foot and leg pain and was found to have new left acute distal popliteal artery and anterior tibial artery occlusion.      CTA revealed extensive occlusion of both deep and superficial arteries in the left lower extremity. Vascular surgery service performed balloon angioplasty of the left anterior tibial artery on 9/21/2024.  Notable operative findings include moderate stenosis of the distal left common femoral artery, new long-segment occlusion of the left superficial femoral artery, reconstitution of the left popliteal artery, and occluded left posterior tibial artery.       S/p left BKA on 09/25/24 and completed a right BKA a few weeks ago. Coordinated care with Svetlana Escobedo PAC - per Dr. Sneed a Dilaudid PCA was recommended for this morning.      shows prescriptions of Hydromorphone and Gabapentin      Opioids used in the past 24h:  *(1) 50mg Tramadol  MME is 10      PLAN:  Acute on chronic left lower extremity pain. S/p left BKA on 09/25.   Multimodal Medication Therapy:   Adjuvants:   - ASA 81mg daily  - APAP 650mg q6h prn  - Topical lidocaine  Opioids:  Coordinated care with Svetlana Escobedo PAC. Per Dr. Sneed a Dilaudid PCA was recommended.  Dilaudid (0.2mg/mL PCA)  CR: 0mg/hr     PCA dose: 0.1mg    One Hour Limit: 0.6mg     PCA Lockout: 10 min  - Stop Tramadol IR 25-50mg q6h prn - first line  - Stop IV Dilaudid 0.5mg q2h prn - second line  Non-medication interventions- Ice  Constipation Prophylaxis- Senna-S  Follow up /Discharge  Recommendations - We recommend prescribing the following at the time of discharge: TBD            Subjective:  Aubrey is seen today in his bed alert and oriented in no acute distress. Reports his pain is currently a 7/10. Discussed transition to PCA and he is agreeable. When he is transitioned off of the PCA he requests to resume PO Tramadol and IV hydromorphone. He was not able to tolerate PO dilaudid or oxycodone in the past.     Denies concerns with nausea, vomiting, constipation.      Reviewed continuing with current plan with adjustments mentioned above, all questions answered.          History   Drug Use No         Tobacco Use      Smoking status: Former        Packs/day: 0.00        Years: 2.0 packs/day for 15.0 years (30.0 ttl pk-yrs)        Types: Cigarettes        Start date: 1971        Quit date: 1986        Years since quittin.7        Passive exposure: Past      Smokeless tobacco: Never        Objective:  Vital signs in last 24 hours:  B/P: 147/70, T: 98.7, P: 83, R: 19   Blood pressure (!) 147/70, pulse 83, temperature 98.7  F (37.1  C), temperature source Oral, resp. rate 19, weight 64.1 kg (141 lb 5 oz), SpO2 92%.        Review of Systems:   As per subjective, all others negative.    Physical Exam    General: in no apparent distress and non-toxic   HEENT: Head normocephalic atraumatic, oral mucosa moist. Sclerae anicteric  Resp: Non-labored breathing  GI: Normoactive bowel sounds  Extremities: Surgical scars (left BKA )  Psych: Normal affect, mood euthymic  Neuro: Grossly normal          Imaging:  Personally Reviewed.    Results for orders placed or performed during the hospital encounter of 24   CTA Abdomen Pelvis Runoff w Contrast   Result Value Ref Range    Radiologist flags New diagnosis of pulmonary embolism (AA)     Impression    IMPRESSION:  1.  Acute segmental and subsegmental pulmonary emboli in the left lower lobe.    2.  Right lower extremity: Extensive  atherosclerotic calcifications. The common iliac, external iliac, internal iliac, and common femoral arteries are patent. The deep femoral artery is occluded. The superficial femoral artery is patent. The popliteal   artery is occluded. The proximal anterior tibial artery is occluded. The tibioperoneal trunk is occluded. There is reconstitution of the posterior tibial and peroneal arteries via collaterals. Below the knee amputation.    3.   Left lower extremity: The left common iliac and left external iliac arteries are patent. The left internal iliac artery is occluded with distal reconstitution. The left common femoral artery is patent. The left deep femoral artery is patent. The   left superficial femoral artery is occluded. There is reconstitution of the popliteal artery via collateral flow. The distal popliteal artery is occluded, new from recent angiogram. Evaluation of the anterior tibial, posterior tibial, and peroneal   arteries is limited due to extensive atherosclerotic calcifications. The tibioperoneal trunk is likely occluded. The anterior tibial artery appears occluded, new from recent angiogram. The peroneal artery is occluded proximally, new from recent   angiogram, with distal reconstitution, with flow to the level of the foot. The proximal posterior tibial artery is occluded with distal reconstitution, with flow to the level of the foot.    4.  Since April 2024, slight increase in size of a large cystic mass in the pancreas.      [Critical Result: New diagnosis of pulmonary embolism]    Finding was identified on 9/21/2024 12:10 AM CDT.     Dr. Venegas was contacted by me on 9/21/2024 12:28 AM CDT and verbalized understanding of the critical result.      IR Lower Extremity Angiogram Left    Impression    IMPRESSION:  1.  Calcified mild focal left common iliac artery stenosis and moderate focal calcified stenosis left external iliac artery, not treated at this setting. Chronically occluded left internal  iliac artery.  2.  LEFT LEG: Severe calcified ostial stenosis left PFA treated with 5 mm drug-coated balloon angioplasty. Acute occlusion of chronically and severely diseased left SFA with new occlusive diffuse dissection of the SFA and popliteal artery segment.   Successful stent reconstruction of the entire SFA and above-knee popliteal artery segment. In-line flow reestablished to the knee. Irregular dissection and/or thrombus in the distal popliteal artery, TP trunk and suspected microembolic occlusions with   chronically and severely diseased tibial vessels prompted thrombolytic infusion. Patient's foot remain severely ischemic below the ankle at this time. Plan for overnight thrombolytic infusion with follow-up angiographic assessment the following day.   Findings and plan discussed with patient's wife Kyung and Dr. Smiley Jay of vascular surgery.     IR Angiogram through catheter (arterial)    Impression    IMPRESSION:    1.  Left leg: There is in-line flow to the knee. Chronic severe trifurcation occlusive disease, not salvageable by endovascular means. Improved collateral perfusion left calf since previous day. Effectively no flow beyond the proximal foot. Lytic   infusion discontinued. Vascular surgery aware of findings and plan.          Lab Results:  Personally Reviewed.   Last Comprehensive Metabolic Panel:  Sodium   Date Value Ref Range Status   09/26/2024 134 (L) 135 - 145 mmol/L Final   06/03/2021 140 134 - 144 mmol/L Final     Potassium   Date Value Ref Range Status   09/26/2024 4.2 3.4 - 5.3 mmol/L Final   10/26/2022 4.5 3.5 - 5.1 mmol/L Final   06/03/2021 4.6 3.5 - 5.1 mmol/L Final     Chloride   Date Value Ref Range Status   09/26/2024 101 98 - 107 mmol/L Final   06/03/2021 105 98 - 107 mmol/L Final     Chloride (External)   Date Value Ref Range Status   11/09/2022 110 (H) 98 - 107 mmol/L Final     Carbon Dioxide   Date Value Ref Range Status   06/03/2021 26 21 - 31 mmol/L Final     Carbon Dioxide  "(CO2)   Date Value Ref Range Status   09/26/2024 25 22 - 29 mmol/L Final   10/26/2022 28 21 - 31 mmol/L Final     Anion Gap   Date Value Ref Range Status   09/26/2024 8 7 - 15 mmol/L Final   10/26/2022 6 3 - 14 mmol/L Final   06/03/2021 9 3 - 14 mmol/L Final     Glucose   Date Value Ref Range Status   09/26/2024 94 70 - 99 mg/dL Final   10/26/2022 92 70 - 105 mg/dL Final   06/03/2021 96 70 - 105 mg/dL Final     GLUCOSE BY METER POCT   Date Value Ref Range Status   09/26/2024 80 70 - 99 mg/dL Final     Urea Nitrogen   Date Value Ref Range Status   09/26/2024 20.7 8.0 - 23.0 mg/dL Final   10/26/2022 36 (H) 7 - 25 mg/dL Final   06/03/2021 38 (H) 7 - 25 mg/dL Final     Creatinine   Date Value Ref Range Status   09/26/2024 0.67 0.67 - 1.17 mg/dL Final   06/03/2021 1.29 0.70 - 1.30 mg/dL Final     GFR Estimate   Date Value Ref Range Status   09/26/2024 >90 >60 mL/min/1.73m2 Final     Comment:     eGFR calculated using 2021 CKD-EPI equation.   06/03/2021 56 (L) >60 mL/min/[1.73_m2] Final     GFR, ESTIMATED POCT   Date Value Ref Range Status   07/21/2022 59 (L) >60 mL/min/1.73m2 Final     Calcium   Date Value Ref Range Status   09/26/2024 7.3 (L) 8.8 - 10.4 mg/dL Final     Comment:     Reference intervals for this test were updated on 7/16/2024 to reflect our healthy population more accurately. There may be differences in the flagging of prior results with similar values performed with this method. Those prior results can be interpreted in the context of the updated reference intervals.   06/03/2021 8.9 8.6 - 10.3 mg/dL Final        UA: No results found for: \"UAMP\", \"UBARB\", \"BENZODIAZEUR\", \"UCANN\", \"UCOC\", \"OPIT\", \"UPCP\"           Please see A&P for additional details of medical decision making.    Isma Irving PA-C  Acute Care Pain Management  Team  Hours of pain coverage Mon-Fri 8-1600, afterhours please call the house officer    [facility] ([place], [place], [place], [place])   Page via Vocera text web console -Click for " Bhavya

## 2024-09-26 NOTE — PLAN OF CARE
Goal Outcome Evaluation:  Problem: Adult Inpatient Plan of Care  Goal: Plan of Care Review  Description: The Plan of Care Review/Shift note should be completed every shift.  The Outcome Evaluation is a brief statement about your assessment that the patient is improving, declining, or no change.  This information will be displayed automatically on your shift  note.  Outcome: Progressing  Flowsheets (Taken 9/25/2024 9007)  Plan of Care Reviewed With: patient  Overall Patient Progress: improving         Plan of Care Reviewed With: patient    Overall Patient Progress: improving Overall Patient Progress: improving     Pt came from ICU around 1830, he is A&O x 4. VSS on 1 LPM via [employer]. Pt has been denying pain on this shift, taking scheduled Tylenol. Tele is NSR.

## 2024-09-26 NOTE — PROGRESS NOTES
"   09/26/24 1014   Appointment Info   Signing Clinician's Name / Credentials (PT) Mirella Rahman, PT, DPT   Living Environment   Current Living Arrangements house   Home Accessibility stairs to enter home   Number of Stairs, Main Entrance   (\"a couple little steps\")   Living Environment Comments Pt currently admitted from TCU. Pt is planning to stay with daughter and son after discharge from TCU until he and his spouse can find a long term home.   Self-Care   Equipment Currently Used at Home walker, rolling;grab bar, toilet  (built in shower chair)   Fall history within last six months no   Activity/Exercise/Self-Care Comment Pt independent with all ADLs prior to recent R BKA. Now patient has bilateral BKAs.   General Information   Onset of Illness/Injury or Date of Surgery 09/20/24   Referring Physician Svetlana Escobedo, MELLY   Patient/Family Therapy Goals Statement (PT) None stated.   Pertinent History of Current Problem (include personal factors and/or comorbidities that impact the POC) Per H&P: \"69 year old male with history of double lung transplant due to alpha-1 antitrypsin deficiency, prior HITS, type 2 diabetes, bilateral chronic limb-threatening ischemia status post recent right BKA, hypertension and GERD admitted on 9/20/2024 due to worsening left foot and leg pain and was found to have new left acute distal popliteal artery and anterior tibial artery occlusion.  \" Now POD #1 s/p L BKA   Existing Precautions/Restrictions fall;weight bearing   Weight-Bearing Status - LLE nonweight-bearing   Weight-Bearing Status - RLE nonweight-bearing   Pain Assessment   Patient Currently in Pain Yes, see Vital Sign flowsheet  (surgical sites)   Range of Motion (ROM)   Range of Motion ROM deficits secondary to weakness   Strength (Manual Muscle Testing)   Strength (Manual Muscle Testing) Deficits observed during functional mobility   Bed Mobility   Bed Mobility supine-sit;sit-supine   Supine-Sit [place] (Bed " Mobility) moderate assist (50% patient effort);verbal cues   Sit-Supine [place] (Bed Mobility) moderate assist (50% patient effort);verbal cues   Bed Mobility Limitations decreased ability to use legs for bridging/pushing   Impairments Contributing to Impaired Bed Mobility decreased strength;impaired balance;pain   Assistive Device (Bed Mobility) bed rails;draw sheet   Transfers   Comment, (Transfers) Unable to attempt due to dizziness/lightheadedness with sitting.   Balance   Balance Comments Good static sitting balance, able to maintain with SBA.   Clinical Impression   Criteria for Skilled Therapeutic Intervention Yes, treatment indicated   PT Diagnosis (PT) impaired functional mobility   Influenced by the following impairments decreased strength, s/p B BKA, impaired balance, decreased ROM   Functional limitations due to impairments transfers, wheelchair mobility   Clinical Presentation (PT Evaluation Complexity) evolving   Clinical Presentation Rationale Clinical jugment.   Clinical Decision Making (Complexity) moderate complexity   Planned Therapy Interventions (PT) balance training;bed mobility training;home exercise program;neuromuscular re-education;patient/family education;strengthening;ROM (range of motion);transfer training;wheelchair management/propulsion training   Risk & Benefits of therapy have been explained evaluation/treatment results reviewed;participants voiced agreement with care plan;participants included;patient   PT Total Evaluation Time   PT Eval, Moderate Complexity Minutes (99356) 10   Physical Therapy Goals   PT Frequency 5x/week   PT Predicted Duration/Target Date for Goal Attainment 10/03/24   PT: Bed Mobility Supervision/stand-by assist;Supine to/from sit;Within precautions   PT: Transfers Minimal assist;Bed to/from chair;Assistive device  (slide board)   PT: Wheelchair Mobility 150 feet;Caregiver SBA;manual wheelchair   Therapeutic Activity   Therapeutic Activities: dynamic  activities to improve functional performance Minutes (20765) 10   Symptoms Noted During/After Treatment Increased pain;Dizziness   Treatment Detail/Skilled Intervention Patient transfers supine <> sit x2 reps throughout session. Pt noting increased dizziness once sitting EOB. Cues for hand placement to facilitate independence with transfer. Patient instructed in use of slide board versus mechanical lift for transfers. Once in sitting, MD and RN arrive and report pt to receive blood transfusion. As patient symptomatic, deferred further therapy and patient returned to supine position. Pt supine in bed at end of session, call light in reach. Direct hand off to RN.   PT Discharge Planning   PT Plan seated scoot at EOB, BKA HEP   PT Discharge Recommendation (DC Rec) Acute Rehab Center-Motivated patient will benefit from intensive, interdisciplinary therapy.  Anticipate will be able to tolerate 3 hours of therapy per day   PT Rationale for DC Rec Patient now NWB bilaterally due to L BKA. Patient will require acute rehab or TCU to learn transfer techniques using UEs only.   PT Brief overview of current status Supine <> sit, mod assist of 1.   PT Equipment Needed at Discharge lift device;wheelchair   Total Session Time   Timed Code Treatment Minutes 10   Total Session Time (sum of timed and untimed services) 20

## 2024-09-26 NOTE — PROGRESS NOTES
[facility]    Medicine Progress Note - Hospitalist Service    Date of Admission:  9/20/2024    Assessment & Plan   Aubrey Duncan is a 69 year old male with history of double lung transplant due to alpha-1 antitrypsin deficiency, prior HITS, type 2 diabetes, bilateral chronic limb-threatening ischemia status post recent right BKA, hypertension and GERD admitted on 9/20/2024 due to worsening left foot and leg pain and was found to have new left acute distal popliteal artery and anterior tibial artery occlusion.      CTA revealed extensive occlusion of both deep and superficial arteries in the left lower extremity. Vascular surgery service performed balloon angioplasty of the left anterior tibial artery on 9/21/2024.  Notable operative findings include moderate stenosis of the distal left common femoral artery, new long-segment occlusion of the left superficial femoral artery, reconstitution of the left popliteal artery, and occluded left posterior tibial artery.      IR service was consulted by vascular surgery service for revascularization. Successful recanalization and reconstruction with stents was performed by IR on 9/21/2024 and patient was subsequently placed on tenecteplase. Interventional radiologist recommends tenecteplase overnight for left lower extremity with plan to hold argatroban and perform follow-up angiogram tomorrow.       9/22 :       Hb 7.5, monitor hb  Transfuse prn to keep hb > 7-8    post-angiogram and lytics catheter removal   Plan for amputation in am ?  On argatroban infusion for h/o HIT, acute b/l PE  Vascular surgery following    Not medically ready for discharge at this time.  Multi day stay at this time        9/23 :     On 1 liter of oxygen  Denies any significant sob    Hb 7.5--7.4, monitor hb  Transfuse prn to keep hb > 7-8  Platelets : 156--136--124    post-angiogram and lytics catheter removal   Plan for amputation on wednesday  On argatroban infusion for  h/o HIT, acute b/l PE  Vascular surgery following : continue DAPT for now (coronary stents, SFA stents), can likely change to antiplatelet monotherapy with aspirin OR plavix if the plan for anticoagulation at discharge due to acute PE, would recommend checking with cardiology     Not medically ready for discharge at this time.  Multi day stay at this time        9/24 :     No new issues noted today    Off and on 1 liter of oxygen  Denies any significant sob    Hb 7.5--7.4---7.5, monitor hb  Transfuse prn to keep hb > 7-8  Platelets : 156--136--124---137    post-angiogram and lytics catheter removal   Plan for amputation on wednesday  On argatroban infusion for h/o HIT, acute b/l PE  Vascular surgery following : continue DAPT for now (coronary stents, SFA stents), can likely change to antiplatelet monotherapy with aspirin OR plavix if the plan for anticoagulation at discharge due to acute PE, would recommend checking with cardiology     Transfer out of ICU to med floor    Not medically ready for discharge at this time.  Multi day stay at this time        9/25 :     No new issues noted today    Off and on 1 liter of oxygen  Denies any significant sob    Hb 7.5--7.4---7.5--7.7, monitor hb  Transfuse prn to keep hb > 7-8  Platelets : 156--136--124---137---150    post-angiogram and lytics catheter removal   Plan for amputation today  On argatroban infusion for h/o HIT, acute b/l PE  Vascular surgery following : continue DAPT for now (coronary stents, SFA stents), can likely change to antiplatelet monotherapy with aspirin OR plavix if the plan for anticoagulation at discharge due to acute PE, would recommend checking with cardiology       Not medically ready for discharge at this time.  Multi day stay at this time        A/p :       Bilateral critical limb ischemia  --Hold argatroban for now as patient is receiving tenecteplase  --Continue tenecteplase per IR recommendation  --Angiogram tomorrow per IR  --Vascular surgery  [id number] assistance  --Continue aspirin  --Monitor for compartment syndrome      Acute bilateral PE  --Hold argatroban for now as patient is receiving tenecteplase  -- Resume argatroban when off tenecteplase  --Hold Plavix while on argatroban.  --Unable to afford Plavix since the co-pay was $5000.    Insulin-dependent diabetes  --Continue 5 units of Lantus and sliding scale insulin  --Currently n.p.o.    History of double lung transplant due to alpha-1 antitrypsin deficiency  --Continue antirejection medications  -- Continue inhalers    Recent right below-knee amputation due to PVD  --Continue aspirin Plavix    Essential hypertension  -Hold Lasix due to possible procedure    GERD  -- Continue Protonix    Orthostatic hypotension  -- Continue PTA Florinef          Diet: Snacks/Supplements Adult: Magic Cup; Between Meals  Snacks/Supplements Adult: Gelatein Plus; Between Meals  Advance Diet as Tolerated: Clear Liquid Diet; Regular Diet Adult    DVT Prophylaxis: Argatroban   Naranjo Catheter: Not present  Lines: None       Cardiac Monitoring: ACTIVE order. Indication: ICU  Code Status: Full Code      Clinically Significant Risk Factors              # Hypoalbuminemia: Lowest albumin = 2.9 g/dL at 9/20/2024  8:24 PM, will monitor as appropriate  # Coagulation Defect: INR = 2.50 (Ref range: 0.85 - 1.15) and/or PTT = 73 Seconds (Ref range: 22 - 38 Seconds), will monitor for bleeding    # Hypertension: Noted on problem list              # Severe Malnutrition: based on nutrition assessment    # Financial/Environmental Concerns:           Disposition Plan     Medically Ready for Discharge: Anticipated in 2-4 Days             Vinnie Nelson MD  Hospitalist Service  [facility]  Securely message with ARPU (more info)  Text page via [employer] Paging/Directory   ______________________________________________________________________    Physical Exam   Vital Signs: Temp: 97.3  F (36.3  C) Temp src:  "Oral BP: (!) 142/68 Pulse: 71   Resp: (!) 33 SpO2: 96 % O2 Device: Oxymask Oxygen Delivery: 3 LPM  Weight: 146 lbs 13.22 oz    GEN: A&Ox3, no acute distress  NEURO: CN II-XII grossly intact. No gross neurologic deficits. Diminished sensation in the L medial foot, but patient reports this is baseline  NECK: trachea midline, no JVD  HEENT: No scleral icterus.  RESP: Nonlabored breathing on room air  CV: RRR by femoral pulse, noncyanotic  MSK: S/P R BKA. Moves all extremities independently. Motor function intact in LLE but mildly limited due to pain.  Necrotic ulcers in the left toes.  L 1st toe with bluish discoloration. Cold extremities   PSYCH: cooperative   PULSES: LLE: Absent distal pulses        Medical Decision Making       40 MINUTES SPENT BY ME on the date of service doing chart review, history, exam, documentation & further activities per the note.      Data   Imaging results reviewed over the past 24 hrs:   Recent Results (from the past 24 hour(s))   POC US Guidance Needle Placement    Narrative    Ultrasound was performed as guidance to an anesthesia procedure.  Click   \"PACS images\" hyperlink below to view any stored images.  For specific   procedure details, view procedure note authored by anesthesia.   POC US Guidance Needle Placement    Narrative    Ultrasound was performed as guidance to an anesthesia procedure.  Click   \"PACS images\" hyperlink below to view any stored images.  For specific   procedure details, view procedure note authored by anesthesia.       "

## 2024-09-26 NOTE — PLAN OF CARE
Problem: Pain Acute  Goal: Optimal Pain Control and Function  Outcome: Progressing  Intervention: Develop Pain Management Plan  Recent Flowsheet Documentation  Taken 9/26/2024 0850 by Ayse Jeff  Pain Management Interventions: medication (see MAR)     Problem: Mobility Impairment  Goal: Optimal Mobility  Outcome: Progressing   Goal Outcome Evaluation:       A/Ox 4. VSS. Tele NSR. RA. Pt c/o chronic shoulder pain & BLE pain, scheduled tylenol administered.       -Hg 6.9, MD notified, orders obtained to administer 1 unit PRBC.      -1103 Blood transfusion started. At 15 min, VS rechecked and BP 92/55 and pt c/o dizziness/lightheadedness. Blood stopped. Recheck was 79/52 and 90/52. MD and blood bank notified, blood returned to blood bank and pt monitored.

## 2024-09-26 NOTE — PLAN OF CARE
Problem: Pain Acute  Goal: Optimal Pain Control and Function  Outcome: Progressing  Intervention: Develop Pain Management Plan  Recent Flowsheet Documentation  Taken 9/26/2024 0850 by Ayse Jeff  Pain Management Interventions: medication (see MAR)     Problem: Mobility Impairment  Goal: Optimal Mobility  Outcome: Progressing   Goal Outcome Evaluation:       A/Ox 4. VSS. Tele NSR. RA. Pt c/o chronic shoulder pain & BLE pain, scheduled tylenol administered. Dilaudid PCA pump and gabapentin PO ordered for pain. Dilaudid PCA pump started and educated Pt. Gabapentin refused by Pt due to previous negative reaction and is on their allergy list, MD notified and Gabapentin discontinued.      -Hg 6.9, MD notified, orders obtained to administer 1 unit PRBC.      -1103 Blood transfusion started. At 15 min, VS rechecked and BP 92/55 and pt c/o dizziness/lightheadedness. Blood stopped. Recheck was 79/52 and 90/52. MD and blood bank notified, blood returned to blood bank and pt monitored. Transfusion Reaction Investigation ordered.     - Lactic came back as 3.6 and Hg as 7.4. MD notified, order for 1 unit PRBC's and 500ml bolus @ 100ml/hr ordered. Order for PICC placed due to multiple infusions and Pt being a difficult stick. PICC team arrived to place PICC around 1620. Plan for Hg recheck after blood transfusion completed.     -Pt pre-medicated w/ Tylenol and Benadryl per MD    -PICC line confirmed by XRAY later this evening and blood transfusion started at 1851. VSS. No symptoms noted. After first 15 min, VSS.

## 2024-09-26 NOTE — PROGRESS NOTES
"[facility]    PICC Line Insertion Procedure Note  Pt. Name: Aubrey Duncan  MRN:        [id number]    Procedure: Insertion of a  Dual Lumen  5 fr  Bard SOLO (valved) Power PICC, Lot number [id number]    Indications: Antibiotics    Contraindications : none    Procedure Details   Patient identified with 2 identifiers and \"Time Out\" conducted.  .     Central line insertion bundle followed: hand hygeine performed prior to procedure, site cleansed with cholraprep, hat, mask, sterile gloves,sterile gown worn, patient draped with maximum barrier head to toe drape, sterile field maintained.    Vein was assessed and found to be compressible and of adequate size. Two ml 1% Lidocaine administered sq to the insertion site. A 5 Fr PICC was inserted into the brachial vein of the left arm with ultrasound guidance. Two attempt(s) required to access vein.   Catheter threaded with difficulty. Good blood return noted.    Modified Seldinger Technique used for insertion.    The 8 sharps that are included in the PICC kit were accounted for and disposed of in the sharps container prior to the breakdown of the sterile field.     Catheter secured with Statlock, biopatch and Tegaderm dressing applied.    Findings:  Total catheter length  43 cm, with 0 cm exposed. Mid upper arm circumference is 30 cm. Catheter was flushed with 20 cc NS. Patient  tolerated procedure well.    Tip placement verified by xray. Xray read by Dr. Swapnil Sanchez . Tip placement in lies over mid SVC.    CLABSI prevention brochure left at bedside.    Patient's primary RN notified PICC is ready for use.    Comments:          Kimmie Lamas, PICC Team    [facility]  [phone]    "

## 2024-09-26 NOTE — PROGRESS NOTES
VASCULAR SURGERY PROGRESS NOTE    Subjective:  Patient was seen and evaluated at the bedside for surgical follow up. Notes pain to the residual limb this morning, rated at an 8. Also notes significant shoulder pain and would like to use an ointment he uses at home for relief. Looking forward to getting out of bed. No other concerns.    Objective:  Intake/Output Summary (Last 24 hours) at 9/26/2024 0747  Last data filed at 9/26/2024 0627  Gross per 24 hour   Intake 3874.65 ml   Output 1575 ml   Net 2299.65 ml     PHYSICAL EXAM:  BP (!) 147/70 (BP Location: Left arm)   Pulse 83   Temp 98.7  F (37.1  C) (Oral)   Resp 19   Wt 64.1 kg (141 lb 5 oz)   SpO2 92%   BMI 22.13 kg/m    General: The patient is alert and oriented. Appropriate. No acute distress  Psych: Pleasant affect, answers questions appropriately  Skin: Color appropriate for race, warm, dry.  Respiratory: Normal respiratory effort   Extremities: BKA dressing clean, dry, and intact. Limb protector in place      Imaging:   Pertinent imaging reviewed    ASSESSMENT:  69 year old male with h/o rest pain in LLE following LLE angiogram, previous R BKA now POD#1 s/p L BKA       PLAN:  Dressing change to left BKA tomorrow, 9/27  Encourage leg elevation and use of limb protector  PT/OT consult  2 units PRBCs ordered with one time dose of Lasix between  SubQ fondaparinux, transition to argatroban gtt after dressing change   PCA and gabapentin added for pain control, appreciate pain team assistance    Discussed pt history, exam, assessment and plan with Dr. Sneed of the vascular surgery service, who is in agreement with the above.    Svetlana Escobedo PA-C  VASCULAR SURGERY

## 2024-09-26 NOTE — PROGRESS NOTES
CLINICAL NUTRITION SERVICES - BRIEF NOTE    EVALUATION OF THE PROGRESS TOWARD GOALS   Diet: Clear Liquid ADAT  Supplements: magic cup 10 am, Gel plus 2 pm   Intake: 75% of breakfast and 25% of dinner on 9/24. NPO yesterday for surgery    9/23 ordered peaches, grapes & magic cup for lunch, ice cream for dinner     NEW FINDINGS   Met with pt, no appetite but planning to order breakfast. Has tried Gel plus and magic cup, likes. Does not want any other supplements such as Ensure. Agrees to Expedite, has had in the past and liked.     S/p L BKA 9/25    INTERVENTIONS  Implementation  Expedite daily for wound healing  Recommend multivitamin d/t poor po

## 2024-09-26 NOTE — PLAN OF CARE
Problem: VTE (Venous Thromboembolism)  Goal: Tissue Perfusion  Outcome: Progressing     Problem: Surgery Nonspecified  Goal: Absence of Bleeding  Outcome: Progressing  Goal: Absence of Infection Signs and Symptoms  Outcome: Progressing  Goal: Optimal Pain Control and Function  Outcome: Progressing  Intervention: Prevent or Manage Pain  Recent Flowsheet Documentation  Taken 9/26/2024 0458 by Ann Sheridan, RN  Pain Management Interventions: medication (see MAR)  Taken 9/26/2024 0004 by Ann Sheridan, RN  Pain Management Interventions: medication (see MAR)  Goal: Effective Urinary Elimination  Outcome: Progressing     Goal Outcome Evaluation:         On arixtra for anticoagulation for now. Lungs diminished. Able to weaned off oxygen this shift. Bilateral stumps and shoulder pain. On scheduled tylenol and PRN tramadol given. Voiding well. Has maintenance IVF infusing. Hemoglobin 6.9 this morning. MD notified.

## 2024-09-27 ENCOUNTER — APPOINTMENT (OUTPATIENT)
Dept: RADIOLOGY | Facility: HOSPITAL | Age: 69
DRG: 239 | End: 2024-09-27
Attending: PAIN MEDICINE
Payer: MEDICARE

## 2024-09-27 LAB
ALBUMIN SERPL BCG-MCNC: 2 G/DL (ref 3.5–5.2)
ALP SERPL-CCNC: 142 U/L (ref 40–150)
ALT SERPL W P-5'-P-CCNC: 15 U/L (ref 0–70)
APTT PPP: 37 SECONDS (ref 22–38)
APTT PPP: 37 SECONDS (ref 22–38)
APTT PPP: 69 SECONDS (ref 22–38)
APTT PPP: 80 SECONDS (ref 22–38)
AST SERPL W P-5'-P-CCNC: 52 U/L (ref 0–45)
BILIRUB DIRECT SERPL-MCNC: <0.2 MG/DL (ref 0–0.3)
BILIRUB SERPL-MCNC: 0.4 MG/DL
ERYTHROCYTE [DISTWIDTH] IN BLOOD BY AUTOMATED COUNT: 16.7 % (ref 10–15)
ERYTHROCYTE [DISTWIDTH] IN BLOOD BY AUTOMATED COUNT: 16.9 % (ref 10–15)
GLUCOSE BLDC GLUCOMTR-MCNC: 105 MG/DL (ref 70–99)
GLUCOSE BLDC GLUCOMTR-MCNC: 114 MG/DL (ref 70–99)
GLUCOSE BLDC GLUCOMTR-MCNC: 115 MG/DL (ref 70–99)
GLUCOSE BLDC GLUCOMTR-MCNC: 136 MG/DL (ref 70–99)
GLUCOSE BLDC GLUCOMTR-MCNC: 181 MG/DL (ref 70–99)
GLUCOSE BLDC GLUCOMTR-MCNC: 88 MG/DL (ref 70–99)
GLUCOSE BLDC GLUCOMTR-MCNC: 88 MG/DL (ref 70–99)
HCT VFR BLD AUTO: 26.8 % (ref 40–53)
HCT VFR BLD AUTO: 27.3 % (ref 40–53)
HGB BLD-MCNC: 8.8 G/DL (ref 13.3–17.7)
HGB BLD-MCNC: 8.9 G/DL (ref 13.3–17.7)
MCH RBC QN AUTO: 30.9 PG (ref 26.5–33)
MCH RBC QN AUTO: 31.3 PG (ref 26.5–33)
MCHC RBC AUTO-ENTMCNC: 32.2 G/DL (ref 31.5–36.5)
MCHC RBC AUTO-ENTMCNC: 33.2 G/DL (ref 31.5–36.5)
MCV RBC AUTO: 94 FL (ref 78–100)
MCV RBC AUTO: 96 FL (ref 78–100)
PLATELET # BLD AUTO: 206 10E3/UL (ref 150–450)
PLATELET # BLD AUTO: 220 10E3/UL (ref 150–450)
PROT SERPL-MCNC: 4.2 G/DL (ref 6.4–8.3)
RBC # BLD AUTO: 2.84 10E6/UL (ref 4.4–5.9)
RBC # BLD AUTO: 2.85 10E6/UL (ref 4.4–5.9)
WBC # BLD AUTO: 8.2 10E3/UL (ref 4–11)
WBC # BLD AUTO: 8.9 10E3/UL (ref 4–11)

## 2024-09-27 PROCEDURE — 250N000012 HC RX MED GY IP 250 OP 636 PS 637: Performed by: STUDENT IN AN ORGANIZED HEALTH CARE EDUCATION/TRAINING PROGRAM

## 2024-09-27 PROCEDURE — 85730 THROMBOPLASTIN TIME PARTIAL: CPT | Performed by: STUDENT IN AN ORGANIZED HEALTH CARE EDUCATION/TRAINING PROGRAM

## 2024-09-27 PROCEDURE — 250N000013 HC RX MED GY IP 250 OP 250 PS 637: Performed by: PAIN MEDICINE

## 2024-09-27 PROCEDURE — 250N000013 HC RX MED GY IP 250 OP 250 PS 637: Performed by: PHYSICIAN ASSISTANT

## 2024-09-27 PROCEDURE — 85730 THROMBOPLASTIN TIME PARTIAL: CPT | Performed by: PHYSICIAN ASSISTANT

## 2024-09-27 PROCEDURE — 85027 COMPLETE CBC AUTOMATED: CPT | Performed by: STUDENT IN AN ORGANIZED HEALTH CARE EDUCATION/TRAINING PROGRAM

## 2024-09-27 PROCEDURE — 250N000011 HC RX IP 250 OP 636: Performed by: STUDENT IN AN ORGANIZED HEALTH CARE EDUCATION/TRAINING PROGRAM

## 2024-09-27 PROCEDURE — 250N000013 HC RX MED GY IP 250 OP 250 PS 637: Performed by: STUDENT IN AN ORGANIZED HEALTH CARE EDUCATION/TRAINING PROGRAM

## 2024-09-27 PROCEDURE — 250N000011 HC RX IP 250 OP 636: Performed by: PHYSICIAN ASSISTANT

## 2024-09-27 PROCEDURE — 85014 HEMATOCRIT: CPT | Performed by: PHYSICIAN ASSISTANT

## 2024-09-27 PROCEDURE — 85730 THROMBOPLASTIN TIME PARTIAL: CPT | Performed by: INTERNAL MEDICINE

## 2024-09-27 PROCEDURE — 99233 SBSQ HOSP IP/OBS HIGH 50: CPT | Performed by: INTERNAL MEDICINE

## 2024-09-27 PROCEDURE — 250N000013 HC RX MED GY IP 250 OP 250 PS 637: Performed by: INTERNAL MEDICINE

## 2024-09-27 PROCEDURE — 82040 ASSAY OF SERUM ALBUMIN: CPT | Performed by: PHYSICIAN ASSISTANT

## 2024-09-27 PROCEDURE — 99418 PROLNG IP/OBS E/M EA 15 MIN: CPT | Performed by: INTERNAL MEDICINE

## 2024-09-27 PROCEDURE — 99233 SBSQ HOSP IP/OBS HIGH 50: CPT | Performed by: PAIN MEDICINE

## 2024-09-27 PROCEDURE — 120N000004 HC R&B MS OVERFLOW

## 2024-09-27 PROCEDURE — 73030 X-RAY EXAM OF SHOULDER: CPT | Mod: 50

## 2024-09-27 PROCEDURE — 250N000009 HC RX 250: Performed by: PAIN MEDICINE

## 2024-09-27 RX ORDER — LIDOCAINE 50 MG/G
OINTMENT TOPICAL 3 TIMES DAILY
Status: DISCONTINUED | OUTPATIENT
Start: 2024-09-27 | End: 2024-10-01 | Stop reason: HOSPADM

## 2024-09-27 RX ORDER — ACETAMINOPHEN 325 MG/1
975 TABLET ORAL EVERY 8 HOURS
Status: DISCONTINUED | OUTPATIENT
Start: 2024-09-27 | End: 2024-10-01 | Stop reason: HOSPADM

## 2024-09-27 RX ORDER — HYDROMORPHONE HCL IN WATER/PF 6 MG/30 ML
0.2 PATIENT CONTROLLED ANALGESIA SYRINGE INTRAVENOUS
Status: DISCONTINUED | OUTPATIENT
Start: 2024-09-27 | End: 2024-09-30

## 2024-09-27 RX ADMIN — Medication 2 CAPSULE: at 08:40

## 2024-09-27 RX ADMIN — FLUTICASONE FUROATE AND VILANTEROL TRIFENATATE 1 PUFF: 200; 25 POWDER RESPIRATORY (INHALATION) at 08:42

## 2024-09-27 RX ADMIN — LIDOCAINE: 50 OINTMENT TOPICAL at 13:43

## 2024-09-27 RX ADMIN — Medication 2 CAPSULE: at 20:55

## 2024-09-27 RX ADMIN — ASPIRIN 81 MG: 81 TABLET, COATED ORAL at 08:41

## 2024-09-27 RX ADMIN — ACETAMINOPHEN 975 MG: 325 TABLET ORAL at 23:18

## 2024-09-27 RX ADMIN — ONDANSETRON 4 MG: 2 INJECTION INTRAMUSCULAR; INTRAVENOUS at 09:08

## 2024-09-27 RX ADMIN — FLUDROCORTISONE ACETATE 0.1 MG: 0.1 TABLET ORAL at 08:42

## 2024-09-27 RX ADMIN — DAPSONE 50 MG: 25 TABLET ORAL at 08:41

## 2024-09-27 RX ADMIN — SENNOSIDES AND DOCUSATE SODIUM 1 TABLET: 8.6; 5 TABLET ORAL at 08:41

## 2024-09-27 RX ADMIN — ACYCLOVIR 400 MG: 400 TABLET ORAL at 08:41

## 2024-09-27 RX ADMIN — DICLOFENAC SODIUM 2 G: 10 GEL TOPICAL at 11:39

## 2024-09-27 RX ADMIN — CARVEDILOL 6.25 MG: 3.12 TABLET, FILM COATED ORAL at 18:20

## 2024-09-27 RX ADMIN — TACROLIMUS 3 MG: 1 CAPSULE ORAL at 08:43

## 2024-09-27 RX ADMIN — FONDAPARINUX SODIUM 2.5 MG: 2.5 INJECTION, SOLUTION SUBCUTANEOUS at 05:43

## 2024-09-27 RX ADMIN — MONTELUKAST 10 MG: 10 TABLET, FILM COATED ORAL at 20:54

## 2024-09-27 RX ADMIN — TACROLIMUS 3 MG: 1 CAPSULE ORAL at 20:55

## 2024-09-27 RX ADMIN — ACYCLOVIR 400 MG: 400 TABLET ORAL at 20:55

## 2024-09-27 RX ADMIN — INSULIN GLARGINE 5 UNITS: 100 INJECTION, SOLUTION SUBCUTANEOUS at 08:42

## 2024-09-27 RX ADMIN — ROSUVASTATIN 20 MG: 10 TABLET, FILM COATED ORAL at 08:40

## 2024-09-27 RX ADMIN — DICLOFENAC SODIUM 2 G: 10 GEL TOPICAL at 16:57

## 2024-09-27 RX ADMIN — ACETAMINOPHEN 975 MG: 325 TABLET ORAL at 16:57

## 2024-09-27 RX ADMIN — ARGATROBAN 1 MCG/KG/MIN: 50 INJECTION INTRAVENOUS at 18:20

## 2024-09-27 RX ADMIN — THERA TABS 1 TABLET: TAB at 08:41

## 2024-09-27 RX ADMIN — DICLOFENAC SODIUM 2 G: 10 GEL TOPICAL at 20:59

## 2024-09-27 RX ADMIN — AZITHROMYCIN DIHYDRATE 250 MG: 250 TABLET, FILM COATED ORAL at 08:41

## 2024-09-27 RX ADMIN — PREDNISONE 2.5 MG: 2.5 TABLET ORAL at 20:55

## 2024-09-27 RX ADMIN — THIAMINE HCL TAB 100 MG 100 MG: 100 TAB at 08:40

## 2024-09-27 RX ADMIN — LIDOCAINE: 50 OINTMENT TOPICAL at 11:39

## 2024-09-27 RX ADMIN — VENLAFAXINE HYDROCHLORIDE 37.5 MG: 37.5 CAPSULE, EXTENDED RELEASE ORAL at 08:41

## 2024-09-27 RX ADMIN — TRAMADOL HYDROCHLORIDE 50 MG: 50 TABLET, COATED ORAL at 13:43

## 2024-09-27 RX ADMIN — Medication 60 ML: at 09:34

## 2024-09-27 RX ADMIN — ACETAMINOPHEN 975 MG: 325 TABLET ORAL at 08:41

## 2024-09-27 RX ADMIN — SENNOSIDES AND DOCUSATE SODIUM 1 TABLET: 8.6; 5 TABLET ORAL at 20:54

## 2024-09-27 RX ADMIN — TRAMADOL HYDROCHLORIDE 50 MG: 50 TABLET, COATED ORAL at 20:54

## 2024-09-27 RX ADMIN — PANTOPRAZOLE SODIUM 40 MG: 40 TABLET, DELAYED RELEASE ORAL at 08:42

## 2024-09-27 RX ADMIN — CARVEDILOL 6.25 MG: 3.12 TABLET, FILM COATED ORAL at 08:42

## 2024-09-27 RX ADMIN — PREDNISONE 5 MG: 5 TABLET ORAL at 08:42

## 2024-09-27 ASSESSMENT — ACTIVITIES OF DAILY LIVING (ADL)
ADLS_ACUITY_SCORE: 47

## 2024-09-27 NOTE — PLAN OF CARE
Problem: Adult Inpatient Plan of Care  Goal: Plan of Care Review  Description: The Plan of Care Review/Shift note should be completed every shift.  The Outcome Evaluation is a brief statement about your assessment that the patient is improving, declining, or no change.  This information will be displayed automatically on your shift  note.  Outcome: Progressing   Goal Outcome Evaluation:         Pt is alert and oriented x 4, pt reports that pain is well controlled with PCA hydromorphone , denies sob, dizziness, or chest pain. Right BKA steri strips in place, clean dry and intact. Left BKA, is wrapped, unable to visualized, dressings are clean dry and intact.  Had a unit of blood for hgb of 6.9.Pt to be repositioned every two hours. Pt had imodium around 6 pm for multiple loose stools. NSR on tele. Uses a urinal at the bed side. Continue to monitor pt.

## 2024-09-27 NOTE — PROGRESS NOTES
[facility]    Medicine Progress Note - Hospitalist Service    Date of Admission:  9/20/2024    Assessment & Plan       Aubrey Duncan is a 69 year old male with history of double lung transplant due to alpha-1 antitrypsin deficiency, prior HITS, type 2 diabetes, bilateral chronic limb-threatening ischemia status post recent right BKA, hypertension and GERD admitted on 9/20/2024 due to worsening left foot and leg pain and was found to have new left acute distal popliteal artery and anterior tibial artery occlusion.      CTA revealed extensive occlusion of both deep and superficial arteries in the left lower extremity. Vascular surgery service performed balloon angioplasty of the left anterior tibial artery on 9/21/2024.  Notable operative findings include moderate stenosis of the distal left common femoral artery, new long-segment occlusion of the left superficial femoral artery, reconstitution of the left popliteal artery, and occluded left posterior tibial artery.      IR service was consulted by vascular surgery service for revascularization. Successful recanalization and reconstruction with stents was performed by IR on 9/21/2024 and patient was subsequently placed on tenecteplase. Interventional radiologist recommends tenecteplase overnight for left lower extremity with plan to hold argatroban and perform follow-up angiogram tomorrow.               A/p :       Bilateral critical limb ischemia  H/o bilateral chronic limb-threatening ischemia, previous right below-knee amputation, POD#3 s/p outpatient LLE angiogram with left anterior tibial angioplasty, unable to advance balloon past distal AT occlusion,     readmitted with acute on chronic LLE ischemia  s/p 24 hrs catheter directed lytic therapy and left SFA stents placement.  There remains three-vessel occlusion of the left calf demonstrated on his completion angiogram from 9/22/24.    There is some flow to the foot is noted on by faint  Doppler signals in the PT and peroneal at the ankle, however  his ischemic narrated to the left foot is likely permanent.       9/25 : s/p left BKA    SubQ fondaparinux, transition to argatroban gtt ( for h/o HIT and now acute PE )  after dressing change on 9/27  On baby aspirin, holding plavix - can likely change to antiplatelet monotherapy with aspirin OR plavix if the plan for anticoagulation at discharge due to acute PE, would recommend checking with cardiology at discharge      Acute blood loss anemia - post left BKA    Hb 7.5--7.4---7.5--7.7--6.9, monitor hb  Plan to transfuse 1 unit prbc  Transfuse prn to keep hb > 7-8  Patient had some drop in bp after 1st unit prbc transfusion  Could be transfusion reaction ?  Will pre medicate with benadryl and tylenol before next blood transfusion        Acute bilateral PE    h/o HIT  --Hold argatroban for now as patient is receiving tenecteplase  -- Resume argatroban when off tenecteplase  --Hold Plavix while on argatroban.  --Unable to afford Plavix since the co-pay was $5000.    9/26 :  on SubQ fondaparinux, transition to argatroban gtt ( for h/o HIT and now acute PE )  after dressing change on 9/27 - per vascular surgery      Insulin-dependent diabetes  --Continue 5 units of Lantus and sliding scale insulin  --Currently n.p.o.    History of double lung transplant due to alpha-1 antitrypsin deficiency  --Continue antirejection medications  -- Continue inhalers    Recent right below-knee amputation due to PVD  --Continue aspirin Plavix    Essential hypertension  -Hold Lasix due to possible procedure    GERD  -- Continue Protonix    Orthostatic hypotension  -- Continue PTA Florinef        BARRIERS TO DISCHARGE :       Having low hb issues post surgery  Vascular surgery following  ON SubQ fondaparinux, transition to argatroban gtt ( for h/o HIT and now acute PE )  after dressing change on 9/27    Not medically ready for discharge at this time.  Multi day stay at this  time  Needs to go to acute rehab once medically ready            Diet: Snacks/Supplements Adult: Magic Cup; Between Meals  Snacks/Supplements Adult: Gelatein Plus; Between Meals  Regular Diet Adult    DVT Prophylaxis: Argatroban   Naranjo Catheter: Not present  Lines: PRESENT      PICC 09/26/24 Double Lumen Left Brachial vein lateral Acute access-Site Assessment: WDL      Cardiac Monitoring: ACTIVE order. Indication: ICU  Code Status: Full Code      Clinically Significant Risk Factors         # Hyponatremia: Lowest Na = 134 mmol/L in last 2 days, will monitor as appropriate      # Hypoalbuminemia: Lowest albumin = 2.9 g/dL at 9/20/2024  8:24 PM, will monitor as appropriate     # Hypertension: Noted on problem list              # Severe Malnutrition: based on nutrition assessment    # Financial/Environmental Concerns:           Disposition Plan     Medically Ready for Discharge: Anticipated in 2-4 Days             Vinnie Nelson MD  Hospitalist Service  [facility]  Securely message with [employer] (more info)  Text page via [employer] Paging/Directory   ______________________________________________________________________      9/26 : EVENTS      No fevers  Denies any significant sob    Hb 7.5--7.4---7.5--7.7--6.9, monitor hb  Plan to transfuse 1 unit prbc  Transfuse prn to keep hb > 7-8  Platelets : 156--136--124---137---150---169    Patient had some drop in bp after 1st unit prbc transfusion  Could be transfusion reaction ?    on SubQ fondaparinux, transition to argatroban gtt ( for h/o HIT and now acute PE )  after dressing change on 9/27 - per vascular surgery  Vascular surgery following : continue DAPT for now (coronary stents, SFA stents), can likely change to antiplatelet monotherapy with aspirin OR plavix if the plan for anticoagulation at discharge due to acute PE, would recommend checking with cardiology       Not medically ready for discharge at this time.  Multi day stay at this  time        Physical Exam   Vital Signs: Temp: 98.3  F (36.8  C) Temp src: Oral BP: (!) 146/69 Pulse: 83   Resp: 20 SpO2: 93 % O2 Device: None (Room air) Oxygen Delivery: 1 LPM  Weight: 141 lbs 5.04 oz    GEN: A&Ox3, no acute distress  NEURO: CN II-XII grossly intact. No gross neurologic deficits. Diminished sensation in the L medial foot, but patient reports this is baseline  NECK: trachea midline, no JVD  HEENT: No scleral icterus.  RESP: Nonlabored breathing on room air  CV: RRR by femoral pulse, noncyanotic  MSK: S/P R BKA. Moves all extremities independently. Motor function intact in LLE but mildly limited due to pain.  Necrotic ulcers in the left toes.  L 1st toe with bluish discoloration. Cold extremities   PSYCH: cooperative   PULSES: LLE: Absent distal pulses        Medical Decision Making       40 MINUTES SPENT BY ME on the date of service doing chart review, history, exam, documentation & further activities per the note.      Data   Imaging results reviewed over the past 24 hrs:   Recent Results (from the past 24 hour(s))   XR Chest Port 1 View    Narrative    EXAM: XR CHEST PORT 1 VIEW  LOCATION: [facility]  DATE: 9/26/2024    INDICATION: RN placed PICC   verify tip placement  COMPARISON: 8/29/2024      Impression    IMPRESSION: Left-sided PICC line terminates over the mid SVC. Heart size is stable. Lung volumes are low with bibasilar atelectasis. No overt failure or pneumothorax.

## 2024-09-27 NOTE — PROGRESS NOTES
Pt's oxygenation  dripped  to the upper 80's. Pt was placed on 2 litters of oxygen. Will continue to monitor and titrate.

## 2024-09-27 NOTE — PROGRESS NOTES
VASCULAR SURGERY PROGRESS NOTE    Subjective:  Patient was seen and evaluated at the bedside for surgical follow up. Pain to the residual limb today but tolerates dressing change well. Therapies recommending acute rehab vs TCU at discharge. No events overnight or new concerns.    Objective:  Intake/Output Summary (Last 24 hours) at 9/27/2024 0741  Last data filed at 9/27/2024 0552  Gross per 24 hour   Intake 1175 ml   Output 1100 ml   Net 75 ml     PHYSICAL EXAM:  BP (!) 156/74 (BP Location: Right arm)   Pulse 91   Temp 98.6  F (37  C) (Oral)   Resp 20   Wt 62.2 kg (137 lb 2 oz)   SpO2 94%   BMI 21.48 kg/m    General: The patient is alert and oriented. Appropriate. No acute distress  Psych: Pleasant affect, answers questions appropriately  Skin: Color appropriate for race, warm, dry.  Respiratory: Normal respiratory effort   Extremities: BKA incision clean and intact, skin was not closed during surgery, mild dusky discoloration to muscle flap, no evidence of skin necrosis. Image uploaded to chart       Imaging:   Pertinent imaging reviewed    ASSESSMENT:  69 year old male with h/o rest pain in LLE following LLE angiogram, previous R BKA now POD#2 s/p L BKA       PLAN:   Dressing changed to left BKA, change daily and PRN with xeroform/petroleum gauze over incision line, dry 4x4s, ABD, kerlix, and ACE   Will discontinue fondaparinux and resume Argaroban gtt. Per pharmacy, recommend starting this at 1800  Encourage leg elevation and use of limb protector  Activity as tolerated, PT and OT following  Multimodal pain control, appreciate pain team  Will need acute rehab/TCU at time of discharge    Discussed pt history, exam, assessment and plan with Dr. Carter of the vascular surgery service, who is in agreement with the above.    Svetlana Escobedo PA-C  VASCULAR SURGERY

## 2024-09-27 NOTE — PROGRESS NOTES
[facility]    Medicine Progress Note - Hospitalist Service    Date of Admission:  9/20/2024    Assessment & Plan   Aubrey Duncan is a 69 year old male with history of double lung transplant due to alpha-1 antitrypsin deficiency, prior HITS, type 2 diabetes, bilateral chronic limb-threatening ischemia status post recent right BKA, hypertension and GERD admitted on 9/20/2024 due to worsening left foot and leg pain and was found to have new left acute distal popliteal artery and anterior tibial artery occlusion.      CTA revealed extensive occlusion of both deep and superficial arteries in the left lower extremity. Vascular surgery service performed balloon angioplasty of the left anterior tibial artery on 9/21/2024.  Notable operative findings include moderate stenosis of the distal left common femoral artery, new long-segment occlusion of the left superficial femoral artery, reconstitution of the left popliteal artery, and occluded left posterior tibial artery.      IR service was consulted by vascular surgery service for revascularization. Successful recanalization and reconstruction with stents was performed by IR on 9/21/2024 and patient was subsequently placed on tenecteplase. Interventional radiologist recommends tenecteplase overnight for left lower extremity with plan to hold argatroban and performed follow-up angiogram on 9/22.     Bilateral critical limb ischemia  PAD  -H/o bilateral chronic limb-threatening ischemia, previous right below-knee amputation, s/p outpatient LLE angiogram with left anterior tibial angioplasty, unable to advance balloon past distal AT occlusion,   -readmitted with acute on chronic LLE ischemia  s/p 24 hrs catheter directed lytic therapy and left SFA stents placement.  -There remains three-vessel occlusion of the left calf demonstrated on his completion angiogram from 9/22/24.    -There is some flow to the foot is noted on by faint Doppler signals in the PT  and peroneal at the ankle, however  his ischemic narrated to the left foot is likely permanent.     -9/25 : s/p left BKA  -SubQ fondaparinux, transition to argatroban gtt ( for h/o HIT and now acute PE )  after dressing change on 9/27  On baby aspirin, holding plavix - can likely change to antiplatelet monotherapy with aspirin OR plavix if the plan for anticoagulation at discharge due to acute PE, would recommend checking with cardiology at discharge    Postop pain  -Was on Dilaudid PCA; still 8/10 pain, then hypoxia  -pain team: stop PCA on 9/27. Tramadol and dilaudid prn.      Acute blood loss anemia - post left BKA  Hb 7.5--7.4---7.5--7.7--6.9, monitor hb. Hgb 8.9 on 9/27  Plan to transfuse 1 unit prbc  Transfuse prn to keep hb > 7-8  Patient had some drop in bp after 1st unit prbc transfusion  Could be transfusion reaction ?  Will pre medicate with benadryl and tylenol before next blood transfusion      Acute bilateral PE   H/o HIT  --Hold argatroban for now as patient is receiving tenecteplase  -- Resume argatroban when off tenecteplase  --Hold Plavix while on argatroban.  --Unable to afford Plavix since the co-pay was $5000.  -9/26 :  on SubQ fondaparinux, transition to argatroban gtt ( for h/o HIT and now acute PE )  after dressing change on 9/27 - per vascular surgery      Insulin-dependent diabetes  --Continue 5 units of Lantus and sliding scale insulin  --Currently n.p.o.    History of double lung transplant due to alpha-1 antitrypsin deficiency  --Continue antirejection medications  -- Continue inhalers    Recent right below-knee amputation due to PVD  --Continue aspirin Plavix    Essential hypertension  -Hold Lasix due to possible procedure    GERD  -- Continue Protonix    Orthostatic hypotension  -- Continue PTA Florinef        BARRIERS TO DISCHARGE :       Having low hb issues post surgery  Vascular surgery following  ON SubQ fondaparinux, transition to argatroban gtt ( for h/o HIT and now acute PE )   after dressing change on 9/27    Not medically ready for discharge at this time.  Multi day stay at this time  Needs to go to acute rehab vs TCU once medically ready            Diet: Snacks/Supplements Adult: Magic Cup; Between Meals  Snacks/Supplements Adult: Gelatein Plus; Between Meals  Regular Diet Adult    DVT Prophylaxis: DOAC  Naranjo Catheter: Not present  Lines: PRESENT      PICC 09/26/24 Double Lumen Left Brachial vein lateral Acute access-Site Assessment: WDL      Cardiac Monitoring: None  Code Status: Full Code      Clinically Significant Risk Factors         # Hyponatremia: Lowest Na = 134 mmol/L in last 2 days, will monitor as appropriate      # Hypoalbuminemia: Lowest albumin = 2.9 g/dL at 9/20/2024  8:24 PM, will monitor as appropriate     # Hypertension: Noted on problem list            # Severe Malnutrition: based on nutrition assessment    # Financial/Environmental Concerns:           Disposition Plan     Medically Ready for Discharge: Anticipated in 2-4 Days             Cassie Lopes MD  Hospitalist Service  [facility]  Securely message with [employer] (more info)  Text page via [employer] Paging/Directory   ______________________________________________________________________    Interval History   C/o 8/10 pain on leg and shoulders, no f/c, no n/v, no sob    Physical Exam   Vital Signs: Temp: 98.6  F (37  C) Temp src: Oral BP: (!) 156/74 Pulse: 91   Resp: 20 SpO2: 94 % O2 Device: Oxymask Oxygen Delivery: 1 LPM  Weight: 137 lbs 2.02 oz    General.  Awake confused, in mild stress  HEENT.  Pupils equal round react to light, anicteric, EOM intact.  Neck supple no JVD.  CVS regular rhythm no murmur gallops.  Lungs.  Clear to auscultation bilateral no wheezing or rales.  Abdomen.  Soft nontender bowel sounds present.  Extremities.  S/p BKA.  Neurological.  Awake and alert. No focal deficit. General weakness  Skin no rash. + pallor.  Psych. anxious      Medical Decision Making       65  MINUTES SPENT BY ME on the date of service doing chart review, history, exam, documentation & further activities per the note.      Data     I have personally reviewed the following data over the past 24 hrs:    8.2  \   8.9 (L)   / 206     N/A N/A N/A /  88   N/A N/A N/A \     Procal: N/A CRP: N/A Lactic Acid: 1.0       INR:  N/A PTT:  37   D-dimer:  N/A Fibrinogen:  N/A       Imaging results reviewed over the past 24 hrs:   Recent Results (from the past 24 hour(s))   XR Chest Port 1 View    Narrative    EXAM: XR CHEST PORT 1 VIEW  LOCATION: [facility]  DATE: 9/26/2024    INDICATION: RN placed PICC   verify tip placement  COMPARISON: 8/29/2024      Impression    IMPRESSION: Left-sided PICC line terminates over the mid SVC. Heart size is stable. Lung volumes are low with bibasilar atelectasis. No overt failure or pneumothorax.

## 2024-09-27 NOTE — PLAN OF CARE
Occupational Therapy: Orders received, chart reviewed, discussed with care team. Per PT, only concerns as of now are for transfers, no skilled OT needs at this time. Will complete orders and defer discharge recommendations to PT. Please reorder if further OT needs arise.    Inessa Ponce, OTR/L 9/27/24

## 2024-09-27 NOTE — PLAN OF CARE
Goal Outcome Evaluation:      Plan of Care Reviewed With: patient               Pt is alert and oriented x4 and able to make needs known.  Pt is a bilateral BKA.  The left side was done this stay and the dressing was changed by the vascular team.  PCA was discontinued and started on PO pain medications which patient states have helped.  Was complaining of nausea this am, zofran given with relief.  He is adjusting himself in bed fairly well.  He has stump protectors on bilaterally placed this afternoon.

## 2024-09-27 NOTE — PROGRESS NOTES
Care Management Follow Up    Length of Stay (days): 6    Expected Discharge Date: 09/30/2024     Concerns to be Addressed: discharge planning     Patient plan of care discussed at interdisciplinary rounds: Yes    Anticipated Discharge Disposition: Acute Rehab vs Transitional Care  Anticipated Discharge Services: None  Anticipated Discharge DME:      Patient/family educated on Medicare website which has current facility and service quality ratings: yes  Education Provided on the Discharge Plan: Yes  Patient/Family in Agreement with the Plan: yes    Referrals Placed by CM/SW:    Private pay costs discussed: Not applicable    Discussed  Partnership in Safe Discharge Planning  document with patient/family: No     Handoff Completed: No, handoff not indicated or clinically appropriate    Additional Information:  Therapy is recommending Acute Rehab at discharge.    11:47 AM  SW met and spoke with Pt regarding therapy recommendations for discharge. Previous CM note mentioned Pt would prefer a TCU near [place], [place]. ELIZABETH provided Pt with a list of acute rehab units in [place] as well as a TCU list of facilities near [place], [place]. Pt reported he will work on reviewing the lists for preferences. ELIZABETH wrote CM contact [phone] at the top of the list in case of any questions for CM regarding discharge planning.     Next Steps: CM to follow up with Pt for facility preferences.      KAMI Julian

## 2024-09-27 NOTE — PROGRESS NOTES
Aubrey was seen for fit and delivery of his bilateral limguard softs per order of his physician. He acquired a right below knee amputation earlier this month and recently had a left transtibial amputation.His mood today was pleasant.     His left limbguard was donned after surgery and was re-fit and adjusted for use post-operatively. He handled the donning process with no obvious issues. A size large limbsoft was chosen for optimal fit to his bulbous residuum and a size medium was chosen for optimal fit to his right limb so that he a matching set for easier use at home.     Today he received 1 left and 1 right limbsoft limb protector, 4 post-op socks, 2 #3 shrinkers and 2 #1 shrinkers, 1 waist belt and 1 donning cone.     Instructions on use and application of the limbguards were given to him along with a business card for contact after discharge.     I plan to see him PRN.       Electronically signed by ALISON Zapata CPO

## 2024-09-27 NOTE — PROGRESS NOTES
[facility] ACUTE INPATIENT PAIN SERVICE    [facility], [facility], [facility], [facility], [place]   PAIN follow up       Assessment/Plan:  Aubrey Duncan is a 69 year old male who was admitted on 9/20/2024.  I was asked  to see the patient for postop pain. Admitted 7 days ago for acute bilateral PE, recent right BKA & worsening left foot pain. History of DM, lung transplant, PVD, GERD, orthostatic hypotension.    shows dilaudid refills in August and September, Gabapentin back in June. Describes pain as mostly in the left thigh and shoulders.  In the last 24 hours he has utilized 2.1mg IV dilaudid via PCA. Was hypoxic (SpO2 80% overnight).    PLAN:  Acute pain secondary to BKA (left on 9/25 & right BKA was on 8/28), in the setting of multiple opioid sensitivities. .   Please continue to monitor sodium & hgb closely while on tramadol and Effexor. Obtain shoulder Xray today.   Multimodal Medication Therapy:   Adjuvants:  tylenol TID, on effexor, gabapentin was not restarted yesterday, lidocaine to upper left thigh & diclofenc to shoulders  Opioids: discontinue PCA - due to hypoxia  Tramadol 25-50mg PRN   IV dilaudid 0.2mg PRN for severe pain   Non-medication interventions- positioning   Constipation Prophylaxis- senna and miralax - monitor closely for diarrhea as pt had 2 BMs yesterday   Follow up /Discharge Recommendations - We recommend prescribing the following at the time of discharge:  tramadol           Subjective:    Met with Aubrey at the bedside. He seems mixed up. Can find his phone or hearing aides (both are on his abdomen). Says he is waiting for his breakfast- which is on his tray right now.  Pain is mostly in both shoulders with limited ROM. Left thigh pain as well. Discussed stopping PCA and trial of tramadol. Pt agrees.  Discussed with RN and also vascular PA Svetlana. 2 BMs yesterday.      Last filled gabapentin on 6/17 (for a 30 day supply). Would not start today given confusion.     History   Drug  Use No         Tobacco Use      Smoking status: Former        Packs/day: 0.00        Years: 2.0 packs/day for 15.0 years (30.0 ttl pk-yrs)        Types: Cigarettes        Start date: 1971        Quit date: 1986        Years since quittin.7        Passive exposure: Past      Smokeless tobacco: Never        Objective:  Vital signs in last 24 hours:  B/P: 156/74, T: 98.6, P: 91, R: 20   Blood pressure (!) 156/74, pulse 91, temperature 98.6  F (37  C), temperature source Oral, resp. rate 20, weight 62.2 kg (137 lb 2 oz), SpO2 94%.        Review of Systems:   As per subjective, all others negative.    Physical Exam    General: in no apparent distress and non-toxic  - sitting up in bed   HEENT: Head normocephalic atraumatic, oral mucosa moist. Sclerae anicteric  CV: Regular rhythm, normal rate, no murmurs  Resp: No wheezes, no rales or rhonchi, no focal consolidations  GI:  active  Skin: No rashes or lesions  Extremities: bilateral BKA   Psych: seems confused   Neuro: as above           Imaging:  Personally Reviewed.    Results for orders placed or performed during the hospital encounter of 24   CTA Abdomen Pelvis Runoff w Contrast   Result Value Ref Range    Radiologist flags New diagnosis of pulmonary embolism (AA)     Impression    IMPRESSION:  1.  Acute segmental and subsegmental pulmonary emboli in the left lower lobe.    2.  Right lower extremity: Extensive atherosclerotic calcifications. The common iliac, external iliac, internal iliac, and common femoral arteries are patent. The deep femoral artery is occluded. The superficial femoral artery is patent. The popliteal   artery is occluded. The proximal anterior tibial artery is occluded. The tibioperoneal trunk is occluded. There is reconstitution of the posterior tibial and peroneal arteries via collaterals. Below the knee amputation.    3.   Left lower extremity: The left common iliac and left external iliac arteries are patent. The left  internal iliac artery is occluded with distal reconstitution. The left common femoral artery is patent. The left deep femoral artery is patent. The   left superficial femoral artery is occluded. There is reconstitution of the popliteal artery via collateral flow. The distal popliteal artery is occluded, new from recent angiogram. Evaluation of the anterior tibial, posterior tibial, and peroneal   arteries is limited due to extensive atherosclerotic calcifications. The tibioperoneal trunk is likely occluded. The anterior tibial artery appears occluded, new from recent angiogram. The peroneal artery is occluded proximally, new from recent   angiogram, with distal reconstitution, with flow to the level of the foot. The proximal posterior tibial artery is occluded with distal reconstitution, with flow to the level of the foot.    4.  Since April 2024, slight increase in size of a large cystic mass in the pancreas.      [Critical Result: New diagnosis of pulmonary embolism]    Finding was identified on 9/21/2024 12:10 AM CDT.     Dr. Venegas was contacted by me on 9/21/2024 12:28 AM CDT and verbalized understanding of the critical result.      IR Lower Extremity Angiogram Left    Impression    IMPRESSION:  1.  Calcified mild focal left common iliac artery stenosis and moderate focal calcified stenosis left external iliac artery, not treated at this setting. Chronically occluded left internal iliac artery.  2.  LEFT LEG: Severe calcified ostial stenosis left PFA treated with 5 mm drug-coated balloon angioplasty. Acute occlusion of chronically and severely diseased left SFA with new occlusive diffuse dissection of the SFA and popliteal artery segment.   Successful stent reconstruction of the entire SFA and above-knee popliteal artery segment. In-line flow reestablished to the knee. Irregular dissection and/or thrombus in the distal popliteal artery, TP trunk and suspected microembolic occlusions with   chronically and  severely diseased tibial vessels prompted thrombolytic infusion. Patient's foot remain severely ischemic below the ankle at this time. Plan for overnight thrombolytic infusion with follow-up angiographic assessment the following day.   Findings and plan discussed with patient's wife Kyung and Dr. Smiley Jay of vascular surgery.     IR Angiogram through catheter (arterial)    Impression    IMPRESSION:    1.  Left leg: There is in-line flow to the knee. Chronic severe trifurcation occlusive disease, not salvageable by endovascular means. Improved collateral perfusion left calf since previous day. Effectively no flow beyond the proximal foot. Lytic   infusion discontinued. Vascular surgery aware of findings and plan.     XR Chest Port 1 View    Impression    IMPRESSION: Left-sided PICC line terminates over the mid SVC. Heart size is stable. Lung volumes are low with bibasilar atelectasis. No overt failure or pneumothorax.        Lab Results:  Personally Reviewed.   Last Comprehensive Metabolic Panel:  Sodium   Date Value Ref Range Status   09/26/2024 134 (L) 135 - 145 mmol/L Final   06/03/2021 140 134 - 144 mmol/L Final     Potassium   Date Value Ref Range Status   09/26/2024 4.2 3.4 - 5.3 mmol/L Final   10/26/2022 4.5 3.5 - 5.1 mmol/L Final   06/03/2021 4.6 3.5 - 5.1 mmol/L Final     Chloride   Date Value Ref Range Status   09/26/2024 101 98 - 107 mmol/L Final   06/03/2021 105 98 - 107 mmol/L Final     Chloride (External)   Date Value Ref Range Status   11/09/2022 110 (H) 98 - 107 mmol/L Final     Carbon Dioxide   Date Value Ref Range Status   06/03/2021 26 21 - 31 mmol/L Final     Carbon Dioxide (CO2)   Date Value Ref Range Status   09/26/2024 25 22 - 29 mmol/L Final   10/26/2022 28 21 - 31 mmol/L Final     Anion Gap   Date Value Ref Range Status   09/26/2024 8 7 - 15 mmol/L Final   10/26/2022 6 3 - 14 mmol/L Final   06/03/2021 9 3 - 14 mmol/L Final     Glucose   Date Value Ref Range Status   10/26/2022 92 70 - 105  "mg/dL Final   06/03/2021 96 70 - 105 mg/dL Final     GLUCOSE BY METER POCT   Date Value Ref Range Status   09/27/2024 88 70 - 99 mg/dL Final     Urea Nitrogen   Date Value Ref Range Status   09/26/2024 20.7 8.0 - 23.0 mg/dL Final   10/26/2022 36 (H) 7 - 25 mg/dL Final   06/03/2021 38 (H) 7 - 25 mg/dL Final     Creatinine   Date Value Ref Range Status   09/26/2024 0.67 0.67 - 1.17 mg/dL Final   06/03/2021 1.29 0.70 - 1.30 mg/dL Final     GFR Estimate   Date Value Ref Range Status   09/26/2024 >90 >60 mL/min/1.73m2 Final     Comment:     eGFR calculated using 2021 CKD-EPI equation.   06/03/2021 56 (L) >60 mL/min/[1.73_m2] Final     GFR, ESTIMATED POCT   Date Value Ref Range Status   07/21/2022 59 (L) >60 mL/min/1.73m2 Final     Calcium   Date Value Ref Range Status   09/26/2024 7.3 (L) 8.8 - 10.4 mg/dL Final     Comment:     Reference intervals for this test were updated on 7/16/2024 to reflect our healthy population more accurately. There may be differences in the flagging of prior results with similar values performed with this method. Those prior results can be interpreted in the context of the updated reference intervals.   06/03/2021 8.9 8.6 - 10.3 mg/dL Final        UA: No results found for: \"UAMP\", \"UBARB\", \"BENZODIAZEUR\", \"UCANN\", \"UCOC\", \"OPIT\", \"UPCP\"           Please see A&P for additional details of medical decision making.  MANAGEMENT DISCUSSED with the following over the past 24 hours: PA and RN    NOTE(S)/MEDICAL RECORDS REVIEWED over the past 24 hours: vascular, pain, nursing and medicine notes   Tests personally interpreted in the past 24 hours:  - Ct showing PE  Tests REVIEWED in the past 24 hours:  - NA and hgb  SUPPLEMENTAL HISTORY, in addition to the patient's history, over the past 24 hours obtained from:   - Rn  Medical complexity over the past 24 hours:  -------------------------- HIGH RISK FOR MORBIDITY -------------------------------------------------------------  - Parenteral (IV) CONTROLLED " SUBSTANCES ordered           Dixie Smith APRN, CNS, CNP  Acute Care Pain Management  Team  Hours of pain coverage Mon-Fri 8-1600, afterhours please call the house officer   [facility] ([place], [place], [place], [place])   Page via Vocera text web console -Click for [employer]og

## 2024-09-28 LAB
APTT PPP: 85 SECONDS (ref 22–38)
ERYTHROCYTE [DISTWIDTH] IN BLOOD BY AUTOMATED COUNT: 16.4 % (ref 10–15)
GLUCOSE BLDC GLUCOMTR-MCNC: 100 MG/DL (ref 70–99)
GLUCOSE BLDC GLUCOMTR-MCNC: 101 MG/DL (ref 70–99)
GLUCOSE BLDC GLUCOMTR-MCNC: 141 MG/DL (ref 70–99)
GLUCOSE BLDC GLUCOMTR-MCNC: 173 MG/DL (ref 70–99)
GLUCOSE BLDC GLUCOMTR-MCNC: 214 MG/DL (ref 70–99)
HCT VFR BLD AUTO: 25.5 % (ref 40–53)
HGB BLD-MCNC: 8.4 G/DL (ref 13.3–17.7)
MCH RBC QN AUTO: 31 PG (ref 26.5–33)
MCHC RBC AUTO-ENTMCNC: 32.9 G/DL (ref 31.5–36.5)
MCV RBC AUTO: 94 FL (ref 78–100)
PLATELET # BLD AUTO: 214 10E3/UL (ref 150–450)
RBC # BLD AUTO: 2.71 10E6/UL (ref 4.4–5.9)
WBC # BLD AUTO: 8.7 10E3/UL (ref 4–11)

## 2024-09-28 PROCEDURE — 250N000012 HC RX MED GY IP 250 OP 636 PS 637: Performed by: STUDENT IN AN ORGANIZED HEALTH CARE EDUCATION/TRAINING PROGRAM

## 2024-09-28 PROCEDURE — 85730 THROMBOPLASTIN TIME PARTIAL: CPT | Performed by: STUDENT IN AN ORGANIZED HEALTH CARE EDUCATION/TRAINING PROGRAM

## 2024-09-28 PROCEDURE — 250N000013 HC RX MED GY IP 250 OP 250 PS 637: Performed by: INTERNAL MEDICINE

## 2024-09-28 PROCEDURE — 99232 SBSQ HOSP IP/OBS MODERATE 35: CPT | Performed by: INTERNAL MEDICINE

## 2024-09-28 PROCEDURE — 250N000013 HC RX MED GY IP 250 OP 250 PS 637: Performed by: STUDENT IN AN ORGANIZED HEALTH CARE EDUCATION/TRAINING PROGRAM

## 2024-09-28 PROCEDURE — 250N000013 HC RX MED GY IP 250 OP 250 PS 637: Performed by: PAIN MEDICINE

## 2024-09-28 PROCEDURE — 120N000004 HC R&B MS OVERFLOW

## 2024-09-28 PROCEDURE — 85014 HEMATOCRIT: CPT | Performed by: STUDENT IN AN ORGANIZED HEALTH CARE EDUCATION/TRAINING PROGRAM

## 2024-09-28 PROCEDURE — 250N000013 HC RX MED GY IP 250 OP 250 PS 637: Performed by: PHYSICIAN ASSISTANT

## 2024-09-28 PROCEDURE — 250N000011 HC RX IP 250 OP 636: Mod: JZ | Performed by: PHYSICIAN ASSISTANT

## 2024-09-28 RX ADMIN — Medication 2 CAPSULE: at 08:32

## 2024-09-28 RX ADMIN — ARGATROBAN 1 MCG/KG/MIN: 50 INJECTION INTRAVENOUS at 04:53

## 2024-09-28 RX ADMIN — SENNOSIDES AND DOCUSATE SODIUM 1 TABLET: 8.6; 5 TABLET ORAL at 08:35

## 2024-09-28 RX ADMIN — FLUTICASONE FUROATE AND VILANTEROL TRIFENATATE 1 PUFF: 200; 25 POWDER RESPIRATORY (INHALATION) at 08:33

## 2024-09-28 RX ADMIN — PREDNISONE 2.5 MG: 2.5 TABLET ORAL at 20:38

## 2024-09-28 RX ADMIN — THERA TABS 1 TABLET: TAB at 08:35

## 2024-09-28 RX ADMIN — INSULIN GLARGINE 5 UNITS: 100 INJECTION, SOLUTION SUBCUTANEOUS at 08:33

## 2024-09-28 RX ADMIN — PANTOPRAZOLE SODIUM 40 MG: 40 TABLET, DELAYED RELEASE ORAL at 08:34

## 2024-09-28 RX ADMIN — Medication 2 CAPSULE: at 20:38

## 2024-09-28 RX ADMIN — ACETAMINOPHEN 975 MG: 325 TABLET ORAL at 17:15

## 2024-09-28 RX ADMIN — ACYCLOVIR 400 MG: 400 TABLET ORAL at 20:39

## 2024-09-28 RX ADMIN — TRAMADOL HYDROCHLORIDE 50 MG: 50 TABLET, COATED ORAL at 17:15

## 2024-09-28 RX ADMIN — DICLOFENAC SODIUM 2 G: 10 GEL TOPICAL at 08:35

## 2024-09-28 RX ADMIN — DICLOFENAC SODIUM 2 G: 10 GEL TOPICAL at 17:16

## 2024-09-28 RX ADMIN — DAPSONE 50 MG: 25 TABLET ORAL at 08:32

## 2024-09-28 RX ADMIN — TRAMADOL HYDROCHLORIDE 50 MG: 50 TABLET, COATED ORAL at 08:39

## 2024-09-28 RX ADMIN — CARVEDILOL 6.25 MG: 3.12 TABLET, FILM COATED ORAL at 17:16

## 2024-09-28 RX ADMIN — FLUDROCORTISONE ACETATE 0.1 MG: 0.1 TABLET ORAL at 08:33

## 2024-09-28 RX ADMIN — ARGATROBAN 1 MCG/KG/MIN: 50 INJECTION INTRAVENOUS at 17:22

## 2024-09-28 RX ADMIN — Medication 60 ML: at 08:31

## 2024-09-28 RX ADMIN — MONTELUKAST 10 MG: 10 TABLET, FILM COATED ORAL at 20:38

## 2024-09-28 RX ADMIN — ASPIRIN 81 MG: 81 TABLET, COATED ORAL at 08:32

## 2024-09-28 RX ADMIN — ACETAMINOPHEN 975 MG: 325 TABLET ORAL at 08:33

## 2024-09-28 RX ADMIN — DICLOFENAC SODIUM 2 G: 10 GEL TOPICAL at 20:39

## 2024-09-28 RX ADMIN — PREDNISONE 5 MG: 5 TABLET ORAL at 08:33

## 2024-09-28 RX ADMIN — THIAMINE HCL TAB 100 MG 100 MG: 100 TAB at 08:33

## 2024-09-28 RX ADMIN — ACETAMINOPHEN 975 MG: 325 TABLET ORAL at 23:38

## 2024-09-28 RX ADMIN — VENLAFAXINE HYDROCHLORIDE 37.5 MG: 37.5 CAPSULE, EXTENDED RELEASE ORAL at 08:32

## 2024-09-28 RX ADMIN — TACROLIMUS 3 MG: 1 CAPSULE ORAL at 08:34

## 2024-09-28 RX ADMIN — LOPERAMIDE HYDROCHLORIDE 2 MG: 2 CAPSULE ORAL at 19:04

## 2024-09-28 RX ADMIN — TACROLIMUS 3 MG: 1 CAPSULE ORAL at 20:38

## 2024-09-28 RX ADMIN — ACYCLOVIR 400 MG: 400 TABLET ORAL at 08:32

## 2024-09-28 RX ADMIN — TRAMADOL HYDROCHLORIDE 50 MG: 50 TABLET, COATED ORAL at 23:38

## 2024-09-28 RX ADMIN — LOPERAMIDE HYDROCHLORIDE 2 MG: 2 CAPSULE ORAL at 12:07

## 2024-09-28 RX ADMIN — CARVEDILOL 6.25 MG: 3.12 TABLET, FILM COATED ORAL at 08:32

## 2024-09-28 ASSESSMENT — ACTIVITIES OF DAILY LIVING (ADL)
ADLS_ACUITY_SCORE: 47
ADLS_ACUITY_SCORE: 47
ADLS_ACUITY_SCORE: 46
ADLS_ACUITY_SCORE: 47
ADLS_ACUITY_SCORE: 46
ADLS_ACUITY_SCORE: 46
ADLS_ACUITY_SCORE: 47
ADLS_ACUITY_SCORE: 47
ADLS_ACUITY_SCORE: 46
ADLS_ACUITY_SCORE: 47
ADLS_ACUITY_SCORE: 47
ADLS_ACUITY_SCORE: 46
ADLS_ACUITY_SCORE: 46
ADLS_ACUITY_SCORE: 47
ADLS_ACUITY_SCORE: 47
ADLS_ACUITY_SCORE: 46
ADLS_ACUITY_SCORE: 47
ADLS_ACUITY_SCORE: 46
ADLS_ACUITY_SCORE: 46

## 2024-09-28 NOTE — PLAN OF CARE
Problem: Adult Inpatient Plan of Care  Goal: Optimal Comfort and Wellbeing  9/28/2024 0508 by Anayeli Bergman RN  Outcome: Progressing  9/27/2024 2148 by Anayeli Bergman RN  Outcome: Progressing  Intervention: Monitor Pain and Promote Comfort  Recent Flowsheet Documentation  Taken 9/28/2024 0430 by Anayeli Bergman RN  Pain Management Interventions:   medication offered but refused   rest  Taken 9/27/2024 2140 by Anayeli Bergman RN  Pain Management Interventions: rest  Taken 9/27/2024 2050 by Anayeli Bergman RN  Pain Management Interventions: medication (see MAR)  Intervention: Provide Person-Centered Care  Recent Flowsheet Documentation  Taken 9/27/2024 2310 by Anayeli Bergman RN  Trust Relationship/Rapport:   care explained   choices provided   emotional support provided   empathic listening provided   questions answered   questions encouraged   reassurance provided   thoughts/feelings acknowledged  Taken 9/27/2024 2050 by Anayeli Bergman RN  Trust Relationship/Rapport:   care explained   choices provided   emotional support provided   empathic listening provided   questions answered   questions encouraged   reassurance provided   thoughts/feelings acknowledged     Problem: Comorbidity Management  Goal: Blood Glucose Levels Within Targeted Range  9/28/2024 0508 by Anayeli Bergman RN  Outcome: Progressing  9/27/2024 2148 by Anayeli Bergman RN  Outcome: Progressing  Intervention: Monitor and Manage Glycemia  Recent Flowsheet Documentation  Taken 9/27/2024 2310 by Anayeli Bergman RN  Medication Review/Management: medications reviewed  Taken 9/27/2024 2050 by Anayeli Bergman RN  Medication Review/Management: medications reviewed     Problem: VTE (Venous Thromboembolism)  Goal: Tissue Perfusion  9/28/2024 0508 by Anayeli Bergman RN  Outcome: Progressing  9/27/2024 2148 by Anayeli Bergman RN  Outcome: Progressing     Problem: Surgery Nonspecified  Goal: Absence of  Bleeding  9/28/2024 0508 by Anayeli Bergman RN  Outcome: Progressing  9/27/2024 2148 by Anayeli Bergman RN  Outcome: Progressing   Goal Outcome Evaluation:      Plan of Care Reviewed With: patient    Overall Patient Progress: improvingOverall Patient Progress: improving         A & O. VSS on RA. Surgical sites dressing intact. Stump protectors on. Argatroban running at 1mcg/kg/min. PTT recheck tomorrow. Hgb 8.4.

## 2024-09-28 NOTE — PLAN OF CARE
\      Problem: Adult Inpatient Plan of Care  Goal: Plan of Care Review  Description: The Plan of Care Review/Shift note should be completed every shift.  The Outcome Evaluation is a brief statement about your assessment that the patient is improving, declining, or no change.  This information will be displayed automatically on your shift  note.  Outcome: Progressing  Flowsheets (Taken 9/28/2024 1843)  Plan of Care Reviewed With: patient   Pt is alert and oriented x4 and able to make needs known.  Pt rolls in bed independently but also needs to be reminded to shift his weight.  Mepilix applied to buttocks.  He has stump protectors in place and is able to put them on and take them off when needed.  He is tolerating diet with no complaints of nausea or vomiting today.  Pain is well controlled with current plan.  He has had some loose stools today which he says happens and takes immodium for.  PRN dose given.

## 2024-09-28 NOTE — PLAN OF CARE
Problem: Adult Inpatient Plan of Care  Goal: Plan of Care Review  Description: The Plan of Care Review/Shift note should be completed every shift.  The Outcome Evaluation is a brief statement about your assessment that the patient is improving, declining, or no change.  This information will be displayed automatically on your shift  note.  Outcome: Progressing  Flowsheets (Taken 9/27/2024 2148)  Plan of Care Reviewed With: patient  Overall Patient Progress: improving     Problem: Adult Inpatient Plan of Care  Goal: Optimal Comfort and Wellbeing  Outcome: Progressing  Intervention: Monitor Pain and Promote Comfort  Recent Flowsheet Documentation  Taken 9/27/2024 2050 by Anayeli Bergman RN  Pain Management Interventions: medication (see MAR)  Intervention: Provide Person-Centered Care  Recent Flowsheet Documentation  Taken 9/27/2024 2050 by Anayeli Bergman RN  Trust Relationship/Rapport:   care explained   choices provided   emotional support provided   empathic listening provided   questions answered   questions encouraged   reassurance provided   thoughts/feelings acknowledged     Problem: Pain Acute  Goal: Optimal Pain Control and Function  Outcome: Progressing  Intervention: Develop Pain Management Plan  Recent Flowsheet Documentation  Taken 9/27/2024 2050 by Anayeli Bergman RN  Pain Management Interventions: medication (see MAR)  Intervention: Prevent or Manage Pain  Recent Flowsheet Documentation  Taken 9/27/2024 2050 by Anayeli Bergman RN  Medication Review/Management: medications reviewed  Intervention: Optimize Psychosocial Wellbeing  Recent Flowsheet Documentation  Taken 9/27/2024 2050 by Anayeli Bergman RN  Supportive Measures: active listening utilized     Problem: Surgery Nonspecified  Goal: Absence of Infection Signs and Symptoms  Outcome: Progressing   Goal Outcome Evaluation:      Plan of Care Reviewed With: patient    Overall Patient Progress: improvingOverall Patient Progress:  improving         A & O. VSS on RA. Tramadol given for pain- effective. Surgical dressing sites WDL. BG @ HS was 115, no insulin needed. Argatroban running at 1mg/ml, PTT recheck @ 2300.

## 2024-09-28 NOTE — PROGRESS NOTES
[facility]    Medicine Progress Note - Hospitalist Service    Date of Admission:  9/20/2024    Assessment & Plan   Aubrey Duncan is a 69 year old male with history of double lung transplant due to alpha-1 antitrypsin deficiency, prior HITS, type 2 diabetes, bilateral chronic limb-threatening ischemia status post recent right BKA, hypertension and GERD admitted on 9/20/2024 due to worsening left foot and leg pain and was found to have new left acute distal popliteal artery and anterior tibial artery occlusion.      CTA revealed extensive occlusion of both deep and superficial arteries in the left lower extremity. Vascular surgery service performed balloon angioplasty of the left anterior tibial artery on 9/21/2024.  Notable operative findings include moderate stenosis of the distal left common femoral artery, new long-segment occlusion of the left superficial femoral artery, reconstitution of the left popliteal artery, and occluded left posterior tibial artery.      IR service was consulted by vascular surgery service for revascularization. Successful recanalization and reconstruction with stents was performed by IR on 9/21/2024 and patient was subsequently placed on tenecteplase. Interventional radiologist recommends tenecteplase overnight for left lower extremity with plan to hold argatroban and performed follow-up angiogram on 9/22.     Bilateral critical limb ischemia  PAD  -H/o bilateral chronic limb-threatening ischemia, previous right below-knee amputation, s/p outpatient LLE angiogram with left anterior tibial angioplasty, unable to advance balloon past distal AT occlusion,   -readmitted with acute on chronic LLE ischemia  s/p 24 hrs catheter directed lytic therapy and left SFA stents placement.  -There remains three-vessel occlusion of the left calf demonstrated on his completion angiogram from 9/22/24.    -There is some flow to the foot is noted on by faint Doppler signals in the PT  and peroneal at the ankle, however  his ischemic narrated to the left foot is likely permanent.     -9/25 : s/p left BKA  -SubQ fondaparinux, transition to argatroban gtt ( for h/o HIT and now acute PE )  after dressing change on 9/27  On baby aspirin, holding plavix - can likely change to antiplatelet monotherapy with aspirin OR plavix if the plan for anticoagulation at discharge due to acute PE, would recommend checking with cardiology at discharge    Postop pain  -Was on Dilaudid PCA; still 8/10 pain, then hypoxia  -pain team: stop PCA on 9/27. Tramadol and iv dilaudid prn.  -pt feels better on 9/28      Acute blood loss anemia - post left BKA  Hb 7.5--7.4---7.5--7.7--6.9, monitor hb. Hgb 8.9 on 9/27  Plan to transfuse 1 unit prbc  Transfuse prn to keep hb > 7-8  Patient had some drop in bp after 1st unit prbc transfusion  Could be transfusion reaction ?  Will pre medicate with benadryl and tylenol before next blood transfusion      Acute bilateral PE   H/o HIT  --Hold argatroban for now as patient is receiving tenecteplase  -- Resume argatroban when off tenecteplase  --Hold Plavix while on argatroban.  --Unable to afford Plavix since the co-pay was $5000.  -9/26 :  on SubQ fondaparinux, transition to argatroban gtt ( for h/o HIT and now acute PE )  after dressing change on 9/27 - per vascular surgery      Insulin-dependent diabetes  --Continue 5 units of Lantus and sliding scale insulin  -hgb A1c 4.2    History of double lung transplant due to alpha-1 antitrypsin deficiency  --Continue antirejection medications  -- Continue inhalers    Recent right below-knee amputation due to PVD  --Continue aspirin Plavix    Essential hypertension  -Hold Lasix due to possible procedure    GERD  -- Continue Protonix    Orthostatic hypotension  -- Continue PTA Florinef        BARRIERS TO DISCHARGE :       Having low hb issues post surgery  Vascular surgery following  ON SubQ fondaparinux, transition to argatroban gtt ( for h/o  HIT and now acute PE )  after dressing change on 9/27    Not medically ready for discharge at this time.  Multi day stay at this time  Needs to go to acute rehab vs TCU once medically ready            Diet: Snacks/Supplements Adult: Magic Cup; Between Meals  Snacks/Supplements Adult: Gelatein Plus; Between Meals  Regular Diet Adult    DVT Prophylaxis: argatroban gtt   Naranjo Catheter: Not present  Lines: PRESENT      PICC 09/26/24 Double Lumen Left Brachial vein lateral Acute access-Site Assessment: WDL      Cardiac Monitoring: None  Code Status: Full Code      Clinically Significant Risk Factors              # Hypoalbuminemia: Lowest albumin = 2 g/dL at 9/27/2024  5:46 PM, will monitor as appropriate     # Hypertension: Noted on problem list            # Severe Malnutrition: based on nutrition assessment    # Financial/Environmental Concerns:           Disposition Plan     Medically Ready for Discharge: Anticipated in 2-4 Days             Cassie Lopes MD  Hospitalist Service  [facility]  Securely message with [employer] (more info)  Text page via [employer] Paging/Directory   ______________________________________________________________________    Interval History   Pain in better control     Physical Exam   Vital Signs: Temp: 97.7  F (36.5  C) Temp src: Oral BP: (!) 146/67 Pulse: 82   Resp: 18 SpO2: 92 % O2 Device: None (Room air)    Weight: 147 lbs 4.28 oz    General.  Awake alert oriented not in acute distress.  HEENT.  Pupils equal round react to light, anicteric, EOM intact.  Neck supple no JVD.  CVS regular rhythm no murmur gallops.  Lungs.  Clear to auscultation bilateral no wheezing or rales.  Abdomen.  Soft nontender bowel sounds present.  Extremities.  S/p bka  Neurological.  Awake and alert. No focal deficit.  Skin no rash. No pallor.  Psych. Normal mood.      Medical Decision Making       44 MINUTES SPENT BY ME on the date of service doing chart review, history, exam, documentation &  further activities per the note.      Data     I have personally reviewed the following data over the past 24 hrs:    8.7  \   8.4 (L)   / 214     N/A N/A N/A /  100 (H)   N/A N/A N/A \     ALT: 15 AST: 52 (H) AP: 142 TBILI: 0.4   ALB: 2.0 (L) TOT PROTEIN: 4.2 (L) LIPASE: N/A     INR:  N/A PTT:  85 (H)   D-dimer:  N/A Fibrinogen:  N/A       Imaging results reviewed over the past 24 hrs:   Recent Results (from the past 24 hour(s))   XR Shoulder 3 View Bilateral    Narrative    EXAM: XR SHOULDER BILATERAL 3 VIEWS  LOCATION: [facility]  DATE: 9/27/2024    INDICATION: bilateral shoulder pain, pos empty can test  COMPARISON: None.      Impression    IMPRESSION:   Right shoulder: No fracture or dislocation. Joint spaces are maintained. Surgical clips project over the mediastinum. Calcifications of the aortic arch.  Left shoulder: No fracture or dislocation. Joint spaces are maintained. Diffuse osseous demineralization. Left-sided PICC with tip near the superior cavoatrial junction.

## 2024-09-28 NOTE — PROGRESS NOTES
Mr. Aubrey Duncan is a 69-year-old male with a history of lung transplant and previous right below-knee amputation who was admitted with acute on chronic left limb ischemia following angiogram and ultimately ended up with left below-knee amputation.  He reports to be doing well with well-controlled pain.  Vitals and labs reviewed.  On my exam the left below-knee amputation dressing is clean, dry, and intact, as is the right BKA.  Both stump protectors are in place.    Plan to continue the current plan of care today with the argatroban drip for his incidentally found acute PE in the setting of previous HIT and prothrombin gene mutation heterozygote.  Continue aspirin  Hold Plavix.  He has been on aspirin and Plavix for coronary disease prior to this admission but had difficulty affording Plavix.  This can be discontinued as he will need oral anticoagulation for at least 3 months for the PE.    I will change the left BKA dressing tomorrow.  We can assess if he will be ready for wound VAC, I worry his skin is too frail to sustain frequent VAC changes over the next several weeks.    Mario Steele MD

## 2024-09-28 NOTE — PROGRESS NOTES
Care Management Follow Up    Length of Stay (days): 7    Expected Discharge Date: 10/01/2024    Anticipated Discharge Plan:  Acute Rehab, Transitional Care    Transportation: TBD    PT Recommendations: Acute Rehab Center-Motivated patient will benefit from intensive, interdisciplinary therapy.  Anticipate will be able to tolerate 3 hours of therapy per day  OT Recommendations:        Barriers to Discharge: medical stability    Prior Living Situation: house with spouse (mother in law)    Discussed  Partnership in Safe Discharge Planning  document with patient/family: No     Handoff Completed: No, handoff not indicated or clinically appropriate    Patient/Spokesperson Updated: Yes. Who? Nikole    Additional Information:  Reviewed with ELIZABETH LEWIS. Met with patient's daughter Nikole to discuss choices for Transitional care (TCU) or acute rehab for continued therapy and skilled nursing.  Per Nikole, their preference is to find a facility near their home in [place]. Their preferences are:  1) [place] (Referral sent)  2) [facility] (Referral sent to [facility])  3) [facility] ([place] in [place] not available in [facility]. Phone number [phone], admissions cell [phone]). Spoke with Ruchi in admissions who can review on Monday. Fax number is: [phone].    Next Steps: Placement and medical readiness.    Alma Boyd RN

## 2024-09-29 LAB
APTT PPP: 85 SECONDS (ref 22–38)
APTT PPP: 95 SECONDS (ref 22–38)
ERYTHROCYTE [DISTWIDTH] IN BLOOD BY AUTOMATED COUNT: 16.6 % (ref 10–15)
GLUCOSE BLDC GLUCOMTR-MCNC: 108 MG/DL (ref 70–99)
GLUCOSE BLDC GLUCOMTR-MCNC: 129 MG/DL (ref 70–99)
GLUCOSE BLDC GLUCOMTR-MCNC: 177 MG/DL (ref 70–99)
GLUCOSE BLDC GLUCOMTR-MCNC: 204 MG/DL (ref 70–99)
GLUCOSE BLDC GLUCOMTR-MCNC: 99 MG/DL (ref 70–99)
HCT VFR BLD AUTO: 25.8 % (ref 40–53)
HGB BLD-MCNC: 8.4 G/DL (ref 13.3–17.7)
MCH RBC QN AUTO: 31.1 PG (ref 26.5–33)
MCHC RBC AUTO-ENTMCNC: 32.6 G/DL (ref 31.5–36.5)
MCV RBC AUTO: 96 FL (ref 78–100)
PLATELET # BLD AUTO: 269 10E3/UL (ref 150–450)
RBC # BLD AUTO: 2.7 10E6/UL (ref 4.4–5.9)
WBC # BLD AUTO: 8.6 10E3/UL (ref 4–11)

## 2024-09-29 PROCEDURE — 85027 COMPLETE CBC AUTOMATED: CPT | Performed by: STUDENT IN AN ORGANIZED HEALTH CARE EDUCATION/TRAINING PROGRAM

## 2024-09-29 PROCEDURE — 250N000013 HC RX MED GY IP 250 OP 250 PS 637: Performed by: PAIN MEDICINE

## 2024-09-29 PROCEDURE — 85730 THROMBOPLASTIN TIME PARTIAL: CPT | Performed by: STUDENT IN AN ORGANIZED HEALTH CARE EDUCATION/TRAINING PROGRAM

## 2024-09-29 PROCEDURE — 250N000013 HC RX MED GY IP 250 OP 250 PS 637: Performed by: STUDENT IN AN ORGANIZED HEALTH CARE EDUCATION/TRAINING PROGRAM

## 2024-09-29 PROCEDURE — 85730 THROMBOPLASTIN TIME PARTIAL: CPT | Performed by: INTERNAL MEDICINE

## 2024-09-29 PROCEDURE — 250N000013 HC RX MED GY IP 250 OP 250 PS 637: Performed by: INTERNAL MEDICINE

## 2024-09-29 PROCEDURE — 99232 SBSQ HOSP IP/OBS MODERATE 35: CPT | Performed by: INTERNAL MEDICINE

## 2024-09-29 PROCEDURE — 120N000001 HC R&B MED SURG/OB

## 2024-09-29 PROCEDURE — 250N000012 HC RX MED GY IP 250 OP 636 PS 637: Performed by: STUDENT IN AN ORGANIZED HEALTH CARE EDUCATION/TRAINING PROGRAM

## 2024-09-29 PROCEDURE — 250N000011 HC RX IP 250 OP 636: Mod: JZ | Performed by: PHYSICIAN ASSISTANT

## 2024-09-29 RX ADMIN — TACROLIMUS 3 MG: 1 CAPSULE ORAL at 20:46

## 2024-09-29 RX ADMIN — LOPERAMIDE HYDROCHLORIDE 2 MG: 2 CAPSULE ORAL at 14:34

## 2024-09-29 RX ADMIN — PANTOPRAZOLE SODIUM 40 MG: 40 TABLET, DELAYED RELEASE ORAL at 08:28

## 2024-09-29 RX ADMIN — PREDNISONE 5 MG: 5 TABLET ORAL at 08:28

## 2024-09-29 RX ADMIN — CARVEDILOL 6.25 MG: 3.12 TABLET, FILM COATED ORAL at 17:00

## 2024-09-29 RX ADMIN — ACYCLOVIR 400 MG: 400 TABLET ORAL at 20:42

## 2024-09-29 RX ADMIN — FLUDROCORTISONE ACETATE 0.1 MG: 0.1 TABLET ORAL at 08:27

## 2024-09-29 RX ADMIN — ACYCLOVIR 400 MG: 400 TABLET ORAL at 08:28

## 2024-09-29 RX ADMIN — THIAMINE HCL TAB 100 MG 100 MG: 100 TAB at 08:27

## 2024-09-29 RX ADMIN — DICLOFENAC SODIUM 2 G: 10 GEL TOPICAL at 08:34

## 2024-09-29 RX ADMIN — FLUTICASONE FUROATE AND VILANTEROL TRIFENATATE 1 PUFF: 200; 25 POWDER RESPIRATORY (INHALATION) at 08:29

## 2024-09-29 RX ADMIN — PREDNISONE 2.5 MG: 2.5 TABLET ORAL at 20:46

## 2024-09-29 RX ADMIN — ASPIRIN 81 MG: 81 TABLET, COATED ORAL at 08:34

## 2024-09-29 RX ADMIN — VENLAFAXINE HYDROCHLORIDE 37.5 MG: 37.5 CAPSULE, EXTENDED RELEASE ORAL at 08:28

## 2024-09-29 RX ADMIN — INSULIN GLARGINE 5 UNITS: 100 INJECTION, SOLUTION SUBCUTANEOUS at 08:28

## 2024-09-29 RX ADMIN — DICLOFENAC SODIUM 2 G: 10 GEL TOPICAL at 14:35

## 2024-09-29 RX ADMIN — ACETAMINOPHEN 975 MG: 325 TABLET ORAL at 08:28

## 2024-09-29 RX ADMIN — TACROLIMUS 3 MG: 1 CAPSULE ORAL at 08:29

## 2024-09-29 RX ADMIN — ARGATROBAN 0.5 MCG/KG/MIN: 50 INJECTION INTRAVENOUS at 22:32

## 2024-09-29 RX ADMIN — Medication 2 CAPSULE: at 08:27

## 2024-09-29 RX ADMIN — DICLOFENAC SODIUM 2 G: 10 GEL TOPICAL at 16:55

## 2024-09-29 RX ADMIN — CARVEDILOL 6.25 MG: 3.12 TABLET, FILM COATED ORAL at 08:28

## 2024-09-29 RX ADMIN — DAPSONE 50 MG: 25 TABLET ORAL at 08:28

## 2024-09-29 RX ADMIN — MONTELUKAST 10 MG: 10 TABLET, FILM COATED ORAL at 20:42

## 2024-09-29 RX ADMIN — LIDOCAINE: 50 OINTMENT TOPICAL at 20:49

## 2024-09-29 RX ADMIN — DICLOFENAC SODIUM 2 G: 10 GEL TOPICAL at 20:51

## 2024-09-29 RX ADMIN — THERA TABS 1 TABLET: TAB at 08:28

## 2024-09-29 RX ADMIN — ACETAMINOPHEN 975 MG: 325 TABLET ORAL at 16:54

## 2024-09-29 RX ADMIN — TRAMADOL HYDROCHLORIDE 50 MG: 50 TABLET, COATED ORAL at 22:32

## 2024-09-29 RX ADMIN — Medication 60 ML: at 08:33

## 2024-09-29 RX ADMIN — ARGATROBAN 0.5 MCG/KG/MIN: 50 INJECTION INTRAVENOUS at 06:52

## 2024-09-29 ASSESSMENT — ACTIVITIES OF DAILY LIVING (ADL)
ADLS_ACUITY_SCORE: 50
ADLS_ACUITY_SCORE: 51
ADLS_ACUITY_SCORE: 46
ADLS_ACUITY_SCORE: 50
ADLS_ACUITY_SCORE: 51
ADLS_ACUITY_SCORE: 46
ADLS_ACUITY_SCORE: 50
ADLS_ACUITY_SCORE: 50
ADLS_ACUITY_SCORE: 51
ADLS_ACUITY_SCORE: 46
ADLS_ACUITY_SCORE: 46
ADLS_ACUITY_SCORE: 51
ADLS_ACUITY_SCORE: 46
ADLS_ACUITY_SCORE: 51
ADLS_ACUITY_SCORE: 50
ADLS_ACUITY_SCORE: 51
ADLS_ACUITY_SCORE: 51
ADLS_ACUITY_SCORE: 50
ADLS_ACUITY_SCORE: 46

## 2024-09-29 NOTE — PLAN OF CARE
Problem: Adult Inpatient Plan of Care  Goal: Optimal Comfort and Wellbeing  Outcome: Progressing  Intervention: Monitor Pain and Promote Comfort  Recent Flowsheet Documentation  Taken 9/29/2024 0030 by Anayeli Bergman RN  Pain Management Interventions: rest  Taken 9/28/2024 2340 by Anayeli Bergmna RN  Pain Management Interventions: medication (see MAR)  Taken 9/28/2024 2030 by Anayeli Bergman RN  Pain Management Interventions:   pain management plan reviewed with patient/caregiver   rest  Intervention: Provide Person-Centered Care  Recent Flowsheet Documentation  Taken 9/28/2024 2340 by Anayeli Bergman RN  Trust Relationship/Rapport:   care explained   emotional support provided   reassurance provided   thoughts/feelings acknowledged  Taken 9/28/2024 2030 by Anayeli Bergman RN  Trust Relationship/Rapport:   care explained   emotional support provided   reassurance provided   thoughts/feelings acknowledged     Problem: VTE (Venous Thromboembolism)  Goal: Tissue Perfusion  Outcome: Progressing     Problem: Surgery Nonspecified  Goal: Absence of Bleeding  Outcome: Progressing   Goal Outcome Evaluation:      Plan of Care Reviewed With: patient    Overall Patient Progress: improvingOverall Patient Progress: improving       A & O. VSS on RA. PRN tramadol and scheduled tylenol effective for pain control. Argatroban gtt dose decreased to  0.5mcg//kg/min. Recheck ptt at 0715. Senna held, diarrhea resolving. Surgical dressing site intact. Make needs known appropriately.     Hgb 8.4

## 2024-09-29 NOTE — PROGRESS NOTES
[facility]    Medicine Progress Note - Hospitalist Service    Date of Admission:  9/20/2024    Assessment & Plan   Aubrey Duncan is a 69 year old male with history of double lung transplant due to alpha-1 antitrypsin deficiency, prior HITS, type 2 diabetes, bilateral chronic limb-threatening ischemia status post recent right BKA, hypertension and GERD admitted on 9/20/2024 due to worsening left foot and leg pain and was found to have new left acute distal popliteal artery and anterior tibial artery occlusion.      CTA revealed extensive occlusion of both deep and superficial arteries in the left lower extremity. Vascular surgery service performed balloon angioplasty of the left anterior tibial artery on 9/21/2024.  Notable operative findings include moderate stenosis of the distal left common femoral artery, new long-segment occlusion of the left superficial femoral artery, reconstitution of the left popliteal artery, and occluded left posterior tibial artery.      IR service was consulted by vascular surgery service for revascularization. Successful recanalization and reconstruction with stents was performed by IR on 9/21/2024 and patient was subsequently placed on tenecteplase. Interventional radiologist recommends tenecteplase overnight for left lower extremity with plan to hold argatroban and performed follow-up angiogram on 9/22.     Bilateral critical limb ischemia  PAD  -H/o bilateral chronic limb-threatening ischemia, previous right below-knee amputation, s/p outpatient LLE angiogram with left anterior tibial angioplasty, unable to advance balloon past distal AT occlusion,   -readmitted with acute on chronic LLE ischemia  s/p 24 hrs catheter directed lytic therapy and left SFA stents placement.  -There remains three-vessel occlusion of the left calf demonstrated on his completion angiogram from 9/22/24.    -There is some flow to the foot is noted on by faint Doppler signals in the PT  and peroneal at the ankle, however  his ischemic narrated to the left foot is likely permanent.     -9/25 : s/p left BKA  -SubQ fondaparinux, transition to argatroban gtt ( for h/o HIT and now acute PE )  after dressing change on 9/27  On baby aspirin, holding plavix - can likely change to antiplatelet monotherapy with aspirin OR plavix if the plan for anticoagulation at discharge due to acute PE    Postop pain  -Was on Dilaudid PCA; still 8/10 pain, then hypoxia  -pain team: stop PCA on 9/27. Tramadol and iv dilaudid prn.  -pt feels better on 9/28      Acute blood loss anemia - post left BKA  Hb 7.5--7.4---7.5--7.7--6.9, monitor hb. Hgb 8.9 on 9/27  Plan to transfuse 1 unit prbc  Transfuse prn to keep hb > 7-8  Patient had some drop in bp after 1st unit prbc transfusion  Could be transfusion reaction ?  Will pre medicate with benadryl and tylenol before next blood transfusion      Acute bilateral PE   H/o HIT  --Hold argatroban for now as patient is receiving tenecteplase  -- Resume argatroban when off tenecteplase  --Hold Plavix while on argatroban.  --Unable to afford Plavix since the co-pay was $5000.  -9/26 :  on SubQ fondaparinux, transition to argatroban gtt ( for h/o HIT and now acute PE )  after dressing change on 9/27 - per vascular surgery      Insulin-dependent diabetes  --Continue 5 units of Lantus and sliding scale insulin  -hgb A1c 4.2    History of double lung transplant due to alpha-1 antitrypsin deficiency  --Continue antirejection medications  -- Continue inhalers    Recent right below-knee amputation due to PVD  --Continue aspirin Plavix    Essential hypertension  -Hold Lasix due to possible procedure    GERD  -- Continue Protonix    Orthostatic hypotension  -- Continue PTA Florinef        BARRIERS TO DISCHARGE :       Having low hb issues post surgery  Vascular surgery following  ON SubQ fondaparinux, transition to argatroban gtt ( for h/o HIT and now acute PE )  after dressing change on  9/27    Not medically ready for discharge at this time.  Multi day stay at this time  Needs to go to acute rehab vs TCU once medically ready            Diet: Snacks/Supplements Adult: Magic Cup; Between Meals  Snacks/Supplements Adult: Gelatein Plus; Between Meals  Regular Diet Adult    DVT Prophylaxis: argatroban gtt  Naranjo Catheter: Not present  Lines: PRESENT      PICC 09/26/24 Double Lumen Left Brachial vein lateral Acute access-Site Assessment: WDL      Cardiac Monitoring: None  Code Status: Full Code      Clinically Significant Risk Factors              # Hypoalbuminemia: Lowest albumin = 2 g/dL at 9/27/2024  5:46 PM, will monitor as appropriate     # Hypertension: Noted on problem list            # Severe Malnutrition: based on nutrition assessment    # Financial/Environmental Concerns:           Disposition Plan     Medically Ready for Discharge: Anticipated in 2-4 Days             Cassie Lopes MD  Hospitalist Service  [facility]  Securely message with [employer] (more info)  Text page via [employer] Paging/Directory   ______________________________________________________________________    Interval History   No acute event, no cp/sob, no n/v, no f/c    Physical Exam   Vital Signs: Temp: 98.1  F (36.7  C) Temp src: Oral BP: 134/63 Pulse: 87   Resp: 18 SpO2: 95 % O2 Device: None (Room air)    Weight: 142 lbs 13.73 oz    General.  Awake alert oriented not in acute distress.  HEENT.  Pupils equal round react to light, anicteric, EOM intact.  Neck supple no JVD.  CVS regular rhythm no murmur gallops.  Lungs.  Clear to auscultation bilateral no wheezing or rales.  Abdomen.  Soft nontender bowel sounds present.  Extremities.  S/p BKA.  Neurological.  Awake and alert. No focal deficit.  Skin no rash. No pallor.  Psych. Normal mood.      Medical Decision Making       45 MINUTES SPENT BY ME on the date of service doing chart review, history, exam, documentation & further activities per the note.       Data     I have personally reviewed the following data over the past 24 hrs:    8.6  \   8.4 (L)   / 269     N/A N/A N/A /  108 (H)   N/A N/A N/A \     INR:  N/A PTT:  85 (H)   D-dimer:  N/A Fibrinogen:  N/A       Imaging results reviewed over the past 24 hrs:   No results found for this or any previous visit (from the past 24 hour(s)).

## 2024-09-29 NOTE — PLAN OF CARE
Problem: Adult Inpatient Plan of Care  Goal: Plan of Care Review  Description: The Plan of Care Review/Shift note should be completed every shift.  The Outcome Evaluation is a brief statement about your assessment that the patient is improving, declining, or no change.  This information will be displayed automatically on your shift  note.  Outcome: Progressing  Flowsheets (Taken 9/29/2024 1712)  Plan of Care Reviewed With: patient   Pt is alert and oriented x4 and able to make needs known.  Dressing changed this afternoon by surgery.  He is tolerating diet well.  Imodium given x1 today although he has only had 1 loose stool.  Dry gauze and kerlix covered areas of arms that are weeping.  PICC line dressing changed this am.  CHG bath done this am.  Argatroban infusing as ordered (see mar).  Turns and repositions well.  Mepilix to buttocks changed this am.

## 2024-09-29 NOTE — PROGRESS NOTES
Vascular surgery progress note    Pain controlled. No new issues overnight. VS and labs reviewed. Hgb stable at 8.4 since starting argatroban gtt two days ago.   L BKA dressing changed today. Open wound is stable, no bleeding, induration, erythema, or drainage.  Mild duskiness of posterior flap is stable from last week   New dressing applied with adaptic, gauze fluffs, ABD pad, kerlix roll, and ace wrap.    A/P:  - Skin surrounding L BKA wound remains too fragile for wound vac  - Continue topical dressing changes to L BKA once every two days and PRN (next dressing change Tues Oct 1). Future dressing changes to be done by RN for the remainder of this admission. Orders placed.  - Right BKA nearly healed with steristrips in place. Continue diligent use of stump   - We will arrange follow-up in vascular surgery clinic in 4 weeks for wound checks  - Wound care instructions for discharge reviewed with Aubrey and wife at bedside.  - Ok to transition to oral anticoagulant for incidental acute PE, will need follow-up with hematologist Dr. Cogan.    Vascular surgery will follow peripherally. Please call with questions.    Mario Steele MD

## 2024-09-30 LAB
APTT PPP: 68 SECONDS (ref 22–38)
APTT PPP: 70 SECONDS (ref 22–38)
ERYTHROCYTE [DISTWIDTH] IN BLOOD BY AUTOMATED COUNT: 16.4 % (ref 10–15)
GLUCOSE BLDC GLUCOMTR-MCNC: 109 MG/DL (ref 70–99)
GLUCOSE BLDC GLUCOMTR-MCNC: 125 MG/DL (ref 70–99)
GLUCOSE BLDC GLUCOMTR-MCNC: 160 MG/DL (ref 70–99)
GLUCOSE BLDC GLUCOMTR-MCNC: 174 MG/DL (ref 70–99)
GLUCOSE BLDC GLUCOMTR-MCNC: 191 MG/DL (ref 70–99)
HCT VFR BLD AUTO: 28.8 % (ref 40–53)
HGB BLD-MCNC: 9 G/DL (ref 13.3–17.7)
INR PPP: 1.58 (ref 0.85–1.15)
MCH RBC QN AUTO: 30 PG (ref 26.5–33)
MCHC RBC AUTO-ENTMCNC: 31.3 G/DL (ref 31.5–36.5)
MCV RBC AUTO: 96 FL (ref 78–100)
PLATELET # BLD AUTO: 372 10E3/UL (ref 150–450)
RBC # BLD AUTO: 3 10E6/UL (ref 4.4–5.9)
WBC # BLD AUTO: 8.1 10E3/UL (ref 4–11)

## 2024-09-30 PROCEDURE — 250N000013 HC RX MED GY IP 250 OP 250 PS 637: Performed by: STUDENT IN AN ORGANIZED HEALTH CARE EDUCATION/TRAINING PROGRAM

## 2024-09-30 PROCEDURE — 250N000011 HC RX IP 250 OP 636: Performed by: INTERNAL MEDICINE

## 2024-09-30 PROCEDURE — 120N000001 HC R&B MED SURG/OB

## 2024-09-30 PROCEDURE — 85730 THROMBOPLASTIN TIME PARTIAL: CPT | Performed by: STUDENT IN AN ORGANIZED HEALTH CARE EDUCATION/TRAINING PROGRAM

## 2024-09-30 PROCEDURE — 99232 SBSQ HOSP IP/OBS MODERATE 35: CPT | Performed by: PHYSICIAN ASSISTANT

## 2024-09-30 PROCEDURE — 250N000012 HC RX MED GY IP 250 OP 636 PS 637: Performed by: STUDENT IN AN ORGANIZED HEALTH CARE EDUCATION/TRAINING PROGRAM

## 2024-09-30 PROCEDURE — 85027 COMPLETE CBC AUTOMATED: CPT | Performed by: STUDENT IN AN ORGANIZED HEALTH CARE EDUCATION/TRAINING PROGRAM

## 2024-09-30 PROCEDURE — 85610 PROTHROMBIN TIME: CPT | Performed by: INTERNAL MEDICINE

## 2024-09-30 PROCEDURE — G0008 ADMIN INFLUENZA VIRUS VAC: HCPCS | Performed by: INTERNAL MEDICINE

## 2024-09-30 PROCEDURE — 85730 THROMBOPLASTIN TIME PARTIAL: CPT | Performed by: INTERNAL MEDICINE

## 2024-09-30 PROCEDURE — 250N000013 HC RX MED GY IP 250 OP 250 PS 637: Performed by: INTERNAL MEDICINE

## 2024-09-30 PROCEDURE — 90662 IIV NO PRSV INCREASED AG IM: CPT | Performed by: INTERNAL MEDICINE

## 2024-09-30 PROCEDURE — 99232 SBSQ HOSP IP/OBS MODERATE 35: CPT | Performed by: INTERNAL MEDICINE

## 2024-09-30 RX ORDER — HYDROMORPHONE HCL IN WATER/PF 6 MG/30 ML
0.2 PATIENT CONTROLLED ANALGESIA SYRINGE INTRAVENOUS 3 TIMES DAILY PRN
Status: DISCONTINUED | OUTPATIENT
Start: 2024-09-30 | End: 2024-10-01

## 2024-09-30 RX ORDER — TRAMADOL HYDROCHLORIDE 50 MG/1
50 TABLET ORAL EVERY 4 HOURS PRN
Status: DISCONTINUED | OUTPATIENT
Start: 2024-09-30 | End: 2024-10-01 | Stop reason: HOSPADM

## 2024-09-30 RX ADMIN — ACETAMINOPHEN 975 MG: 325 TABLET ORAL at 08:29

## 2024-09-30 RX ADMIN — FLUTICASONE FUROATE AND VILANTEROL TRIFENATATE 1 PUFF: 200; 25 POWDER RESPIRATORY (INHALATION) at 08:33

## 2024-09-30 RX ADMIN — THERA TABS 1 TABLET: TAB at 08:31

## 2024-09-30 RX ADMIN — TACROLIMUS 3 MG: 1 CAPSULE ORAL at 08:28

## 2024-09-30 RX ADMIN — ROSUVASTATIN 20 MG: 10 TABLET, FILM COATED ORAL at 08:30

## 2024-09-30 RX ADMIN — ACYCLOVIR 400 MG: 400 TABLET ORAL at 08:30

## 2024-09-30 RX ADMIN — APIXABAN 5 MG: 5 TABLET, FILM COATED ORAL at 08:45

## 2024-09-30 RX ADMIN — VENLAFAXINE HYDROCHLORIDE 37.5 MG: 37.5 CAPSULE, EXTENDED RELEASE ORAL at 08:30

## 2024-09-30 RX ADMIN — LIDOCAINE: 50 OINTMENT TOPICAL at 20:21

## 2024-09-30 RX ADMIN — FLUDROCORTISONE ACETATE 0.1 MG: 0.1 TABLET ORAL at 08:31

## 2024-09-30 RX ADMIN — ACETAMINOPHEN 975 MG: 325 TABLET ORAL at 00:05

## 2024-09-30 RX ADMIN — Medication 2 CAPSULE: at 08:29

## 2024-09-30 RX ADMIN — CARVEDILOL 6.25 MG: 3.12 TABLET, FILM COATED ORAL at 17:09

## 2024-09-30 RX ADMIN — DICLOFENAC SODIUM 2 G: 10 GEL TOPICAL at 20:21

## 2024-09-30 RX ADMIN — Medication 2 CAPSULE: at 20:21

## 2024-09-30 RX ADMIN — CARVEDILOL 6.25 MG: 3.12 TABLET, FILM COATED ORAL at 08:30

## 2024-09-30 RX ADMIN — THIAMINE HCL TAB 100 MG 100 MG: 100 TAB at 08:30

## 2024-09-30 RX ADMIN — INSULIN GLARGINE 5 UNITS: 100 INJECTION, SOLUTION SUBCUTANEOUS at 08:33

## 2024-09-30 RX ADMIN — LIDOCAINE: 50 OINTMENT TOPICAL at 08:31

## 2024-09-30 RX ADMIN — ACYCLOVIR 400 MG: 400 TABLET ORAL at 20:21

## 2024-09-30 RX ADMIN — PANTOPRAZOLE SODIUM 40 MG: 40 TABLET, DELAYED RELEASE ORAL at 08:31

## 2024-09-30 RX ADMIN — PREDNISONE 5 MG: 5 TABLET ORAL at 08:29

## 2024-09-30 RX ADMIN — PREDNISONE 2.5 MG: 2.5 TABLET ORAL at 20:21

## 2024-09-30 RX ADMIN — INFLUENZA A VIRUS A/VICTORIA/4897/2022 IVR-238 (H1N1) ANTIGEN (FORMALDEHYDE INACTIVATED), INFLUENZA A VIRUS A/CALIFORNIA/122/2022 SAN-022 (H3N2) ANTIGEN (FORMALDEHYDE INACTIVATED), AND INFLUENZA B VIRUS B/MICHIGAN/01/2021 ANTIGEN (FORMALDEHYDE INACTIVATED) 0.5 ML: 60; 60; 60 INJECTION, SUSPENSION INTRAMUSCULAR at 12:27

## 2024-09-30 RX ADMIN — DICLOFENAC SODIUM 2 G: 10 GEL TOPICAL at 17:09

## 2024-09-30 RX ADMIN — DAPSONE 50 MG: 25 TABLET ORAL at 08:28

## 2024-09-30 RX ADMIN — TACROLIMUS 3 MG: 1 CAPSULE ORAL at 20:21

## 2024-09-30 RX ADMIN — ASPIRIN 81 MG: 81 TABLET, COATED ORAL at 08:31

## 2024-09-30 RX ADMIN — APIXABAN 5 MG: 5 TABLET, FILM COATED ORAL at 20:21

## 2024-09-30 RX ADMIN — MONTELUKAST 10 MG: 10 TABLET, FILM COATED ORAL at 20:21

## 2024-09-30 RX ADMIN — DICLOFENAC SODIUM 2 G: 10 GEL TOPICAL at 08:35

## 2024-09-30 RX ADMIN — AZITHROMYCIN DIHYDRATE 250 MG: 250 TABLET, FILM COATED ORAL at 08:29

## 2024-09-30 RX ADMIN — ACETAMINOPHEN 975 MG: 325 TABLET ORAL at 17:09

## 2024-09-30 ASSESSMENT — ACTIVITIES OF DAILY LIVING (ADL)
ADLS_ACUITY_SCORE: 48
ADLS_ACUITY_SCORE: 50
ADLS_ACUITY_SCORE: 48
ADLS_ACUITY_SCORE: 50
ADLS_ACUITY_SCORE: 48
ADLS_ACUITY_SCORE: 50

## 2024-09-30 NOTE — PROGRESS NOTES
[facility] ACUTE INPATIENT PAIN SERVICE    [facility], [facility], [facility], [facility], [place]   PAIN PROGRESS      Assessment/Plan:  Aubrey Duncan is a 69 year old male who was admitted on 9/20/2024.  I was asked by Vinnie Nelson MD. Past medical history of  alpha-1 antitrypsin deficiency, prior HITS, type 2 diabetes, bilateral chronic limb-threatening ischemia status post recent right BKA, hypertension and GERD.    Admitted on 9/20/2024 due to worsening left foot and leg pain and was found to have new left acute distal popliteal artery and anterior tibial artery occlusion. CTA revealed extensive occlusion of both deep and superficial arteries in the left lower extremity. Vascular surgery service performed balloon angioplasty of the left anterior tibial artery on 9/21/2024. Findings included moderate stenosis of the distal left common femoral artery, new long-segment occlusion of the left superficial femoral artery, reconstitution of the left popliteal artery, and occluded left posterior tibial artery.       S/p BKA (left on 9/25 & right BKA was on 8/28). Reports he is recovering well and pain has been tolerable. Occasionally he will get phantom limb sensation. However, primary pain at this time is stump pain.     shows prescriptions of Hydromorphone and Gabapentin      Opioids used in the past 24h:  *(1) Tramadol 50mg tablet    MME is 10    PLAN:  Acute on chronic left lower extremity pain.    Multimodal Medication Therapy:   Adjuvants:   - ASA 81mg daily  - APAP 650mg q6h prn  - Topical lidocaine  Opioids:  - Tramadol IR 25-50mg q6h changed to q4h prn - first line  - IV Dilaudid 2mg q3h reduced to max 3/day - second line  Non-medication interventions- Ice  Constipation Prophylaxis- Senna-S  Follow up /Discharge Recommendations - We recommend prescribing the following at the time of discharge:  TBD              Subjective:  Aubrey is seen today in his bed alert and oriented in no acute distress. Reports  his pain is currently a 7/10 located in his left lower limb. Admits to occasional episodes of phantom limb sensation. Primary pain is stump pain. Tramadol has been providing good overall relief of his post-op and breakthrough pain.      Denies concerns with nausea, vomiting, constipation.      Reviewed continuing with current plan with medication changes mentioned above, all questions answered.          History   Drug Use No         Tobacco Use      Smoking status: Former        Packs/day: 0.00        Years: 2.0 packs/day for 15.0 years (30.0 ttl pk-yrs)        Types: Cigarettes        Start date: 1971        Quit date: 1986        Years since quittin.7        Passive exposure: Past      Smokeless tobacco: Never        Objective:  Vital signs in last 24 hours:  B/P: 147/91, T: 98, P: 93, R: 16   Blood pressure (!) 147/91, pulse 93, temperature 98  F (36.7  C), temperature source Oral, resp. rate 16, weight 64.8 kg (142 lb 13.7 oz), SpO2 94%.        Review of Systems:   As per subjective, all others negative.    Physical Exam    General: in no apparent distress and non-toxic   HEENT: Head normocephalic atraumatic, oral mucosa moist. Sclerae anicteric  CV: Regular rhythm, normal rate, no murmurs  Resp: No wheezes, no rales or rhonchi, no focal consolidations  GI: Normoactive bowel sounds  Skin: Surgical scars  Psych: Normal affect, mood euthymic  Neuro: Grossly normal          Imaging:  Personally Reviewed.    Results for orders placed or performed during the hospital encounter of 24   CTA Abdomen Pelvis Runoff w Contrast   Result Value Ref Range    Radiologist flags New diagnosis of pulmonary embolism (AA)     Impression    IMPRESSION:  1.  Acute segmental and subsegmental pulmonary emboli in the left lower lobe.    2.  Right lower extremity: Extensive atherosclerotic calcifications. The common iliac, external iliac, internal iliac, and common femoral arteries are patent. The deep femoral artery is  occluded. The superficial femoral artery is patent. The popliteal   artery is occluded. The proximal anterior tibial artery is occluded. The tibioperoneal trunk is occluded. There is reconstitution of the posterior tibial and peroneal arteries via collaterals. Below the knee amputation.    3.   Left lower extremity: The left common iliac and left external iliac arteries are patent. The left internal iliac artery is occluded with distal reconstitution. The left common femoral artery is patent. The left deep femoral artery is patent. The   left superficial femoral artery is occluded. There is reconstitution of the popliteal artery via collateral flow. The distal popliteal artery is occluded, new from recent angiogram. Evaluation of the anterior tibial, posterior tibial, and peroneal   arteries is limited due to extensive atherosclerotic calcifications. The tibioperoneal trunk is likely occluded. The anterior tibial artery appears occluded, new from recent angiogram. The peroneal artery is occluded proximally, new from recent   angiogram, with distal reconstitution, with flow to the level of the foot. The proximal posterior tibial artery is occluded with distal reconstitution, with flow to the level of the foot.    4.  Since April 2024, slight increase in size of a large cystic mass in the pancreas.      [Critical Result: New diagnosis of pulmonary embolism]    Finding was identified on 9/21/2024 12:10 AM CDT.     Dr. Venegas was contacted by me on 9/21/2024 12:28 AM CDT and verbalized understanding of the critical result.      IR Lower Extremity Angiogram Left    Impression    IMPRESSION:  1.  Calcified mild focal left common iliac artery stenosis and moderate focal calcified stenosis left external iliac artery, not treated at this setting. Chronically occluded left internal iliac artery.  2.  LEFT LEG: Severe calcified ostial stenosis left PFA treated with 5 mm drug-coated balloon angioplasty. Acute occlusion of  chronically and severely diseased left SFA with new occlusive diffuse dissection of the SFA and popliteal artery segment.   Successful stent reconstruction of the entire SFA and above-knee popliteal artery segment. In-line flow reestablished to the knee. Irregular dissection and/or thrombus in the distal popliteal artery, TP trunk and suspected microembolic occlusions with   chronically and severely diseased tibial vessels prompted thrombolytic infusion. Patient's foot remain severely ischemic below the ankle at this time. Plan for overnight thrombolytic infusion with follow-up angiographic assessment the following day.   Findings and plan discussed with patient's wife Kyung and Dr. Smiley Jay of vascular surgery.     IR Angiogram through catheter (arterial)    Impression    IMPRESSION:    1.  Left leg: There is in-line flow to the knee. Chronic severe trifurcation occlusive disease, not salvageable by endovascular means. Improved collateral perfusion left calf since previous day. Effectively no flow beyond the proximal foot. Lytic   infusion discontinued. Vascular surgery aware of findings and plan.     XR Chest Port 1 View    Impression    IMPRESSION: Left-sided PICC line terminates over the mid SVC. Heart size is stable. Lung volumes are low with bibasilar atelectasis. No overt failure or pneumothorax.   XR Shoulder 3 View Bilateral    Impression    IMPRESSION:   Right shoulder: No fracture or dislocation. Joint spaces are maintained. Surgical clips project over the mediastinum. Calcifications of the aortic arch.  Left shoulder: No fracture or dislocation. Joint spaces are maintained. Diffuse osseous demineralization. Left-sided PICC with tip near the superior cavoatrial junction.        Lab Results:  Personally Reviewed.   Last Comprehensive Metabolic Panel:  Sodium   Date Value Ref Range Status   09/26/2024 134 (L) 135 - 145 mmol/L Final   06/03/2021 140 134 - 144 mmol/L Final     Potassium   Date Value Ref  Range Status   09/26/2024 4.2 3.4 - 5.3 mmol/L Final   10/26/2022 4.5 3.5 - 5.1 mmol/L Final   06/03/2021 4.6 3.5 - 5.1 mmol/L Final     Chloride   Date Value Ref Range Status   09/26/2024 101 98 - 107 mmol/L Final   06/03/2021 105 98 - 107 mmol/L Final     Chloride (External)   Date Value Ref Range Status   11/09/2022 110 (H) 98 - 107 mmol/L Final     Carbon Dioxide   Date Value Ref Range Status   06/03/2021 26 21 - 31 mmol/L Final     Carbon Dioxide (CO2)   Date Value Ref Range Status   09/26/2024 25 22 - 29 mmol/L Final   10/26/2022 28 21 - 31 mmol/L Final     Anion Gap   Date Value Ref Range Status   09/26/2024 8 7 - 15 mmol/L Final   10/26/2022 6 3 - 14 mmol/L Final   06/03/2021 9 3 - 14 mmol/L Final     Glucose   Date Value Ref Range Status   10/26/2022 92 70 - 105 mg/dL Final   06/03/2021 96 70 - 105 mg/dL Final     GLUCOSE BY METER POCT   Date Value Ref Range Status   09/30/2024 109 (H) 70 - 99 mg/dL Final     Urea Nitrogen   Date Value Ref Range Status   09/26/2024 20.7 8.0 - 23.0 mg/dL Final   10/26/2022 36 (H) 7 - 25 mg/dL Final   06/03/2021 38 (H) 7 - 25 mg/dL Final     Creatinine   Date Value Ref Range Status   09/26/2024 0.67 0.67 - 1.17 mg/dL Final   06/03/2021 1.29 0.70 - 1.30 mg/dL Final     GFR Estimate   Date Value Ref Range Status   09/26/2024 >90 >60 mL/min/1.73m2 Final     Comment:     eGFR calculated using 2021 CKD-EPI equation.   06/03/2021 56 (L) >60 mL/min/[1.73_m2] Final     GFR, ESTIMATED POCT   Date Value Ref Range Status   07/21/2022 59 (L) >60 mL/min/1.73m2 Final     Calcium   Date Value Ref Range Status   09/26/2024 7.3 (L) 8.8 - 10.4 mg/dL Final     Comment:     Reference intervals for this test were updated on 7/16/2024 to reflect our healthy population more accurately. There may be differences in the flagging of prior results with similar values performed with this method. Those prior results can be interpreted in the context of the updated reference intervals.   06/03/2021 8.9 8.6  "- 10.3 mg/dL Final        UA: No results found for: \"UAMP\", \"UBARB\", \"BENZODIAZEUR\", \"UCANN\", \"UCOC\", \"OPIT\", \"UPCP\"           Please see A&P for additional details of medical decision making.    Isma Irving PA-C  Acute Care Pain Management  Team  Hours of pain coverage Mon-Fri 8-1600, afterhours please call the house officer   [facility] ([place], [place], [place], [place])   Page via [place] web console -Click for Vocera           "

## 2024-09-30 NOTE — PROGRESS NOTES
[facility]    Medicine Progress Note - Hospitalist Service    Date of Admission:  9/20/2024    Assessment & Plan   Aubrey Duncan is a 69 year old male with history of double lung transplant due to alpha-1 antitrypsin deficiency, prior HITS, type 2 diabetes, bilateral chronic limb-threatening ischemia status post recent right BKA, hypertension and GERD admitted on 9/20/2024 due to worsening left foot and leg pain and was found to have new left acute distal popliteal artery and anterior tibial artery occlusion.      CTA revealed extensive occlusion of both deep and superficial arteries in the left lower extremity. Vascular surgery service performed balloon angioplasty of the left anterior tibial artery on 9/21/2024.  Notable operative findings include moderate stenosis of the distal left common femoral artery, new long-segment occlusion of the left superficial femoral artery, reconstitution of the left popliteal artery, and occluded left posterior tibial artery.      IR service was consulted by vascular surgery service for revascularization. Successful recanalization and reconstruction with stents was performed by IR on 9/21/2024 and patient was subsequently placed on tenecteplase. Interventional radiologist recommends tenecteplase overnight for left lower extremity with plan to hold argatroban and performed follow-up angiogram on 9/22.     Bilateral critical limb ischemia  PAD  -H/o bilateral chronic limb-threatening ischemia, previous right below-knee amputation, s/p outpatient LLE angiogram with left anterior tibial angioplasty, unable to advance balloon past distal AT occlusion,   -readmitted with acute on chronic LLE ischemia  s/p 24 hrs catheter directed lytic therapy and left SFA stents placement.  -There remains three-vessel occlusion of the left calf demonstrated on his completion angiogram from 9/22/24.    -There is some flow to the foot is noted on by faint Doppler signals in the PT  and peroneal at the ankle, however  his ischemic narrated to the left foot is likely permanent.     -9/25 : s/p left BKA  -SubQ fondaparinux, transition to argatroban gtt ( for h/o HIT and now acute PE )  after dressing change on 9/27  -On 9/30 stop Argatroban gtt and start on DOAC (Eliquis). Continue ASA and continue to hold Plavix.     Postop pain  -Was on Dilaudid PCA; still 8/10 pain, then hypoxia  -pain team: stop PCA on 9/27. Tramadol and iv dilaudid prn.  -pt feels better on 9/28      Acute blood loss anemia - post left BKA  Hb 7.5--7.4---7.5--7.7--6.9, monitor hb. Hgb 8.9 on 9/27  Plan to transfuse 1 unit prbc  Transfuse prn to keep hb > 7-8  Patient had some drop in bp after 1st unit prbc transfusion  Could be transfusion reaction ?  Will pre medicate with benadryl and tylenol before next blood transfusion      Acute bilateral PE   H/o HIT  -had h/o DVT years ago after leg surgery  --Hold argatroban for now as patient is receiving tenecteplase  -- Resume argatroban when off tenecteplase  --Hold Plavix while on argatroban.  --Unable to afford Plavix since the co-pay was $5000.  -9/26 :  on SubQ fondaparinux, transition to argatroban gtt ( for h/o HIT and now acute PE )  after dressing change on 9/27 - per vascular surgery, changed to Eliquis on 9/30.       Insulin-dependent diabetes  --Continue 5 units of Lantus and sliding scale insulin  -hgb A1c 4.2    History of double lung transplant due to alpha-1 antitrypsin deficiency  --Continue antirejection medications  -- Continue inhalers    Recent right below-knee amputation due to PVD  --Continue aspirin Plavix    Essential hypertension  -Hold Lasix due to possible procedure    GERD  -- Continue Protonix    Orthostatic hypotension  -- Continue PTA Florinef          Diet: Snacks/Supplements Adult: Magic Cup; Between Meals  Snacks/Supplements Adult: Gelatein Plus; Between Meals  Regular Diet Adult    DVT Prophylaxis: DOAC  Naranjo Catheter: Not present  Lines:  PRESENT      PICC 09/26/24 Double Lumen Left Brachial vein lateral Acute access-Site Assessment: WDL      Cardiac Monitoring: None  Code Status: Full Code      Clinically Significant Risk Factors              # Hypoalbuminemia: Lowest albumin = 2 g/dL at 9/27/2024  5:46 PM, will monitor as appropriate     # Hypertension: Noted on problem list            # Severe Malnutrition: based on nutrition assessment    # Financial/Environmental Concerns:           Disposition Plan     Medically Ready for Discharge: Ready Now    Await placement: TCU vs acute rehab?         Cassie Lopes MD  Hospitalist Service  [facility]  Securely message with Super (more info)  Text page via [facility] Paging/Directory   ______________________________________________________________________    Interval History   Pain in control, no new complaints, wants to get out of hospital. I told him I will stop the argatroban gtt and start on Eliquis, and pt agreed with it as long as insurance covers. Pharmacy checked it and ot will be 47$/30 days for Eliquis or Xarelto.    Physical Exam   Vital Signs: Temp: 97.8  F (36.6  C) Temp src: Oral BP: (!) 155/80 Pulse: 94   Resp: 17 SpO2: 94 % O2 Device: None (Room air)    Weight: 142 lbs 13.73 oz    General.  Awake alert oriented not in acute distress.  HEENT.  Pupils equal round react to light, anicteric, EOM intact.  Neck supple no JVD.  CVS regular rhythm no murmur gallops.  Lungs.  Clear to auscultation bilateral no wheezing or rales.  Abdomen.  Soft nontender bowel sounds present.  Extremities.  S/p BKA  Neurological.  Awake and alert. No focal deficit.  Skin no rash. No pallor.  Psych. Normal mood.      Medical Decision Making       48 MINUTES SPENT BY ME on the date of service doing chart review, history, exam, documentation & further activities per the note.      Data     I have personally reviewed the following data over the past 24 hrs:    8.1  \   9.0 (L)   / 372     N/A N/A  N/A /  109 (H)   N/A N/A N/A \     INR:  1.58 (H) PTT:  70 (H)   D-dimer:  N/A Fibrinogen:  N/A       Imaging results reviewed over the past 24 hrs:   No results found for this or any previous visit (from the past 24 hour(s)).

## 2024-09-30 NOTE — PLAN OF CARE
"  Problem: Adult Inpatient Plan of Care  Goal: Plan of Care Review  Description: The Plan of Care Review/Shift note should be completed every shift.  The Outcome Evaluation is a brief statement about your assessment that the patient is improving, declining, or no change.  This information will be displayed automatically on your shift  note.  Outcome: Progressing  Flowsheets (Taken 9/30/2024 1158)  Plan of Care Reviewed With:   patient   spouse  Goal: Patient-Specific Goal (Individualized)  Description: You can add care plan individualizations to a care plan. Examples of Individualization might be:  \"Parent requests to be called daily at 9am for status\", \"I have a hard time hearing out of my right ear\", or \"Do not touch me to wake me up as it startles  me\".  Outcome: Progressing  Goal: Absence of Hospital-Acquired Illness or Injury  Outcome: Progressing  Intervention: Identify and Manage Fall Risk  Recent Flowsheet Documentation  Taken 9/30/2024 0829 by Jeevan Joy RN  Safety Promotion/Fall Prevention: activity supervised  Intervention: Prevent Skin Injury  Recent Flowsheet Documentation  Taken 9/30/2024 1036 by Jeevan Joy RN  Body Position:   heels elevated   supine  Taken 9/30/2024 0836 by Jeevan Joy RN  Body Position:   supine   left   right   turned   sitting up in bed  Intervention: Prevent Infection  Recent Flowsheet Documentation  Taken 9/30/2024 0829 by Jeevan Joy RN  Infection Prevention:   hand hygiene promoted   rest/sleep promoted   single patient room provided  Goal: Optimal Comfort and Wellbeing  Outcome: Progressing  Intervention: Provide Person-Centered Care  Recent Flowsheet Documentation  Taken 9/30/2024 0829 by Jeevan Joy RN  Trust Relationship/Rapport:   care explained   emotional support provided   questions answered   reassurance provided  Goal: Readiness for Transition of Care  Outcome: Progressing     Problem: Risk for Delirium  Goal: Improved Sleep  Outcome: Progressing   " Pt called stating she had xrays of her hip done the other day and ever since she's been having extreme pain in her left leg / hip  States she can barely make it across her living room with the pain she is having  Asking if you can send an anti-inflammatory medicine in to the [facility] pharmacy    Problem: Mobility Impairment  Goal: Optimal Mobility  Outcome: Progressing  Intervention: Optimize Mobility  Recent Flowsheet Documentation  Taken 9/30/2024 1036 by Jeevan Joy RN  Positioning/Transfer Devices:   in use   pillows  Taken 9/30/2024 0836 by Jeevan Joy RN  Positioning/Transfer Devices:   in use   pillows  Taken 9/30/2024 0829 by Jeevan Joy RN  Assistive Device Utilized: lift device  Activity Management: activity adjusted per tolerance  Taken 9/30/2024 0636 by Jeevan Joy RN  Positioning/Transfer Devices:   in use   pillows     Problem: Comorbidity Management  Goal: Blood Glucose Levels Within Targeted Range  Outcome: Progressing  Intervention: Monitor and Manage Glycemia  Recent Flowsheet Documentation  Taken 9/30/2024 0829 by Jeevan Joy RN  Medication Review/Management: medications reviewed  Goal: Maintenance of Heart Failure Symptom Control  Outcome: Progressing  Intervention: Maintain Heart Failure Management  Recent Flowsheet Documentation  Taken 9/30/2024 0829 by Jeevan Joy RN  Medication Review/Management: medications reviewed  Goal: Blood Pressure in Desired Range  Outcome: Progressing  Intervention: Maintain Blood Pressure Management  Recent Flowsheet Documentation  Taken 9/30/2024 0829 by Jeevan Joy RN  Medication Review/Management: medications reviewed     Problem: VTE (Venous Thromboembolism)  Goal: Right Ventricular Function  Outcome: Progressing     Problem: Surgery Nonspecified  Goal: Absence of Bleeding  Outcome: Progressing  Goal: Absence of Infection Signs and Symptoms  Outcome: Progressing  Goal: Optimal Pain Control and Function  Outcome: Progressing  Goal: Effective Urinary Elimination  Outcome: Progressing   Goal Outcome Evaluation:      Plan of Care Reviewed With: patient, spouse

## 2024-09-30 NOTE — PROGRESS NOTES
"CLINICAL NUTRITION SERVICES - REASSESSMENT NOTE     Nutrition Prescription    RECOMMENDATIONS FOR MDs/PROVIDERS TO ORDER:      Malnutrition Status:    Severe in context of chronic illness (previously noted)    Recommendations already ordered by Registered Dietitian (RD):  Continue supplements    Future/Additional Recommendations:  Will continue to monitor intake, labs, healing per vascular surgery     EVALUATION OF THE PROGRESS TOWARD GOALS   Diet: Regular  Supplements: magic cup at 10 AM, Gel Plus at 2 PM  Intake: Good appetite, eating 100% of meals.       NEW FINDINGS   Pt reports understanding of eating with DM, he is avoiding sugary foods.  He is also aware of need for increased protein. He ordered an omelet this am.  We talked about high protein foods, pt says some of the offerings in the hospital are not that good.    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)  Most Recent Weight: 64.8 kg (142 lb 13.7 oz)    Weight History:   Wt Readings from Last 10 Encounters:   24 64.8 kg (142 lb 13.7 oz)   24 61.2 kg (135 lb)   24 60.4 kg (133 lb 3.2 oz)   24 64.5 kg (142 lb 3.2 oz)   24 64.5 kg (142 lb 3.2 oz)   24 109.9 kg (242 lb 3.2 oz)   24 62 kg (136 lb 9.6 oz)   24 67.7 kg (149 lb 4 oz)   24 65.3 kg (144 lb)   24 65.3 kg (144 lb)        GI CONCERNS  Loose stools 24    LABS  Reviewed  FSB, 109, 191    MEDICATIONS  Reviewed  Novolog  Lantus  Multivitamin with mineral  Prednisone  Crestor  Senna-docusate  Thiamine  Expedite    MALNUTRITION:24  % Weight Loss:  > 7.5% in 3 months (severe malnutrition)- 18.3% wt loss x3 months   % Intake:  </= 75% for >/= 1 month (severe malnutrition)  Subcutaneous Fat Loss:  Orbital region mild depletion  Muscle Loss:  Temporal region moderate depletion, Clavicle bone region moderate depletion, and Acromion bone region moderate depletion  Fluid Retention:  None " noted     Malnutrition Diagnosis: Severe malnutrition   (Improving)  In Context of:  Chronic illness or disease      Previous Goals   Pt to meet >75% nutritional needs   Electrolytes WNL   Evaluation: Met    Previous Nutrition Diagnosis  Malnutrition related to chronic illness as evidenced by po <75% > 1 month, 18.3% wt loss x3 months. Muscle and fat losses.   Evaluation: Improving    INTERVENTIONS  Implementation  Continue supplements    Goals  Patient to consume % of nutritionally adequate meals three times per day, or the equivalent with supplements/snacks.    Healing    Monitoring/Evaluation  Progress toward goals will be monitored and evaluated per protocol.

## 2024-09-30 NOTE — CONSULTS
9/30- Test claim for DOAC'S- Both Eliquis and Xarelto are covered medications $47 or 30 days supply. Thank you for allowing me to help with your patient  Svitlana Link [facility]  Pharmacy Discharge Liaison [facility]

## 2024-09-30 NOTE — PROGRESS NOTES
Spoke to Ruchi at [facility]. She is reviewing now and will call CM back     10:44 AM  [facility] unable to accept.  [facility] in [place] no beds.     Spoke to Jonna at [facility], they are reviewing and will call back     12:41 PM  Met with patient and spouse. They are really hoping [facility] can accept. They agreed to additional referrals to [facility], [facility], and [facility]     12:54 PM  Jonna from [facility] called. Says they can clinically accept patient. Hopefully tomorrow. She will call back in a little bit to let us know for sure when bed will be available.     Updated patient and wife     2:26 PM  Jonna from [facility] left message stating they can accept patient tomorrow before 1400. Orders should be faxed to [phone]

## 2024-09-30 NOTE — PLAN OF CARE
"  Problem: Adult Inpatient Plan of Care  Goal: Plan of Care Review  Description: The Plan of Care Review/Shift note should be completed every shift.  The Outcome Evaluation is a brief statement about your assessment that the patient is improving, declining, or no change.  This information will be displayed automatically on your shift  note.  Outcome: Progressing  Goal: Patient-Specific Goal (Individualized)  Description: You can add care plan individualizations to a care plan. Examples of Individualization might be:  \"Parent requests to be called daily at 9am for status\", \"I have a hard time hearing out of my right ear\", or \"Do not touch me to wake me up as it startles  me\".  Outcome: Progressing  Goal: Absence of Hospital-Acquired Illness or Injury  Outcome: Progressing  Intervention: Prevent Skin Injury  Recent Flowsheet Documentation  Taken 9/29/2024 2036 by David Baron RN  Body Position: supine  Intervention: Prevent Infection  Recent Flowsheet Documentation  Taken 9/29/2024 2250 by David Baron RN  Infection Prevention: hand hygiene promoted  Goal: Optimal Comfort and Wellbeing  Outcome: Progressing  Goal: Readiness for Transition of Care  Outcome: Progressing     Problem: Risk for Delirium  Goal: Optimal Coping  Outcome: Progressing  Goal: Improved Behavioral Control  Outcome: Progressing  Goal: Improved Attention and Thought Clarity  Outcome: Progressing  Goal: Improved Sleep  Outcome: Progressing     Problem: Mobility Impairment  Goal: Optimal Mobility  Outcome: Progressing  Intervention: Optimize Mobility  Recent Flowsheet Documentation  Taken 9/29/2024 2036 by David Baron RN  Positioning/Transfer Devices:   pillows   in use     Problem: Pain Acute  Goal: Optimal Pain Control and Function  Outcome: Progressing     Problem: Comorbidity Management  Goal: Blood Glucose Levels Within Targeted Range  Outcome: Progressing  Goal: Maintenance of Heart Failure Symptom Control  Outcome: " Progressing  Goal: Blood Pressure in Desired Range  Outcome: Progressing     Problem: VTE (Venous Thromboembolism)  Goal: Tissue Perfusion  Outcome: Progressing  Goal: Right Ventricular Function  Outcome: Progressing     Problem: Surgery Nonspecified  Goal: Absence of Bleeding  Outcome: Progressing  Goal: Absence of Infection Signs and Symptoms  Outcome: Progressing  Goal: Optimal Pain Control and Function  Outcome: Progressing  Goal: Effective Urinary Elimination  Outcome: Progressing   Goal Outcome Evaluation:         Pt is alert and orientated x4, able to make his needs known, tramadol was given for pain, stump protector is in place, VSS and will monitor,

## 2024-09-30 NOTE — PLAN OF CARE
"  Problem: Pain Acute  Goal: Optimal Pain Control and Function  Outcome: Progressing  Intervention: Develop Pain Management Plan  Recent Flowsheet Documentation  Taken 9/30/2024 0005 by Leslie Granados RN  Pain Management Interventions: rest  Intervention: Prevent or Manage Pain  Recent Flowsheet Documentation  Taken 9/30/2024 0005 by Leslie Granados RN  Medication Review/Management: medications reviewed     Problem: VTE (Venous Thromboembolism)  Goal: Tissue Perfusion  Outcome: Progressing  Goal: Right Ventricular Function  Outcome: Progressing     Problem: Adult Inpatient Plan of Care  Goal: Plan of Care Review  Description: The Plan of Care Review/Shift note should be completed every shift.  The Outcome Evaluation is a brief statement about your assessment that the patient is improving, declining, or no change.  This information will be displayed automatically on your shift  note.  Outcome: Progressing  Goal: Patient-Specific Goal (Individualized)  Description: You can add care plan individualizations to a care plan. Examples of Individualization might be:  \"Parent requests to be called daily at 9am for status\", \"I have a hard time hearing out of my right ear\", or \"Do not touch me to wake me up as it startles  me\".  Outcome: Progressing  Goal: Absence of Hospital-Acquired Illness or Injury  Outcome: Progressing  Intervention: Identify and Manage Fall Risk  Recent Flowsheet Documentation  Taken 9/30/2024 0005 by Leslie Granados RN  Safety Promotion/Fall Prevention: activity supervised  Intervention: Prevent Skin Injury  Recent Flowsheet Documentation  Taken 9/30/2024 0005 by Leslie Granados, RN  Body Position: weight shifting  Goal: Optimal Comfort and Wellbeing  Outcome: Progressing  Intervention: Monitor Pain and Promote Comfort  Recent Flowsheet Documentation  Taken 9/30/2024 0005 by Leslie Granados RN  Pain Management Interventions: rest  Goal: Readiness for Transition of Care  Outcome: " Progressing   Goal Outcome Evaluation:     Pt rates left stump pain 4/10. Bilateral BKA's with stump protectors on. Argatroban gtt at 1.9 ml/hr. MN PTT 68. 0600 PTT 70, no change in gtt rate. . Vss.

## 2024-10-01 VITALS
DIASTOLIC BLOOD PRESSURE: 105 MMHG | RESPIRATION RATE: 20 BRPM | OXYGEN SATURATION: 95 % | BODY MASS INDEX: 22.37 KG/M2 | HEART RATE: 106 BPM | TEMPERATURE: 97.9 F | WEIGHT: 142.86 LBS | SYSTOLIC BLOOD PRESSURE: 180 MMHG

## 2024-10-01 LAB
APTT PPP: 55 SECONDS (ref 22–38)
BKR LAB AP TR RXN INTERPRETATION: NORMAL
BKR LAB AP TRAN RXN RECOMMENDATION: NORMAL
BKR LAB AP TRANS SIGNS AND SX: NORMAL
BKR LAB AP TRANSFUSION REACTION: NORMAL
ERYTHROCYTE [DISTWIDTH] IN BLOOD BY AUTOMATED COUNT: 16 % (ref 10–15)
GLUCOSE BLDC GLUCOMTR-MCNC: 121 MG/DL (ref 70–99)
GLUCOSE BLDC GLUCOMTR-MCNC: 124 MG/DL (ref 70–99)
GLUCOSE BLDC GLUCOMTR-MCNC: 253 MG/DL (ref 70–99)
HCT VFR BLD AUTO: 29 % (ref 40–53)
HGB BLD-MCNC: 9.3 G/DL (ref 13.3–17.7)
MCH RBC QN AUTO: 30.8 PG (ref 26.5–33)
MCHC RBC AUTO-ENTMCNC: 32.1 G/DL (ref 31.5–36.5)
MCV RBC AUTO: 96 FL (ref 78–100)
PATH REPORT.COMMENTS IMP SPEC: NORMAL
PATH REPORT.COMMENTS IMP SPEC: NORMAL
PLATELET # BLD AUTO: 408 10E3/UL (ref 150–450)
RBC # BLD AUTO: 3.02 10E6/UL (ref 4.4–5.9)
WBC # BLD AUTO: 8.4 10E3/UL (ref 4–11)

## 2024-10-01 PROCEDURE — 99232 SBSQ HOSP IP/OBS MODERATE 35: CPT | Performed by: PHYSICIAN ASSISTANT

## 2024-10-01 PROCEDURE — 99239 HOSP IP/OBS DSCHRG MGMT >30: CPT | Performed by: INTERNAL MEDICINE

## 2024-10-01 PROCEDURE — 250N000013 HC RX MED GY IP 250 OP 250 PS 637: Performed by: PHYSICIAN ASSISTANT

## 2024-10-01 PROCEDURE — 250N000013 HC RX MED GY IP 250 OP 250 PS 637: Performed by: INTERNAL MEDICINE

## 2024-10-01 PROCEDURE — 85730 THROMBOPLASTIN TIME PARTIAL: CPT | Performed by: STUDENT IN AN ORGANIZED HEALTH CARE EDUCATION/TRAINING PROGRAM

## 2024-10-01 PROCEDURE — 250N000013 HC RX MED GY IP 250 OP 250 PS 637: Performed by: STUDENT IN AN ORGANIZED HEALTH CARE EDUCATION/TRAINING PROGRAM

## 2024-10-01 PROCEDURE — 250N000012 HC RX MED GY IP 250 OP 636 PS 637: Performed by: STUDENT IN AN ORGANIZED HEALTH CARE EDUCATION/TRAINING PROGRAM

## 2024-10-01 PROCEDURE — 85014 HEMATOCRIT: CPT | Performed by: STUDENT IN AN ORGANIZED HEALTH CARE EDUCATION/TRAINING PROGRAM

## 2024-10-01 RX ORDER — AMOXICILLIN 250 MG
1 CAPSULE ORAL 2 TIMES DAILY
DISCHARGE
Start: 2024-10-01

## 2024-10-01 RX ORDER — HYDROMORPHONE HYDROCHLORIDE 2 MG/1
1 TABLET ORAL EVERY 6 HOURS PRN
Qty: 15 TABLET | Status: SHIPPED | DISCHARGE
Start: 2024-10-01

## 2024-10-01 RX ORDER — LANOLIN ALCOHOL/MO/W.PET/CERES
100 CREAM (GRAM) TOPICAL DAILY
DISCHARGE
Start: 2024-10-01

## 2024-10-01 RX ORDER — TRAMADOL HYDROCHLORIDE 25 MG/1
25 TABLET, COATED ORAL EVERY 4 HOURS PRN
Qty: 20 TABLET | Refills: 0 | Status: SHIPPED | OUTPATIENT
Start: 2024-10-01

## 2024-10-01 RX ORDER — BISACODYL 10 MG
10 SUPPOSITORY, RECTAL RECTAL DAILY PRN
DISCHARGE
Start: 2024-10-01

## 2024-10-01 RX ADMIN — LIDOCAINE: 50 OINTMENT TOPICAL at 08:19

## 2024-10-01 RX ADMIN — TRAMADOL HYDROCHLORIDE 50 MG: 50 TABLET, COATED ORAL at 11:23

## 2024-10-01 RX ADMIN — APIXABAN 5 MG: 5 TABLET, FILM COATED ORAL at 08:24

## 2024-10-01 RX ADMIN — Medication 1 TABLET: at 08:23

## 2024-10-01 RX ADMIN — Medication 2 CAPSULE: at 08:19

## 2024-10-01 RX ADMIN — PANTOPRAZOLE SODIUM 40 MG: 40 TABLET, DELAYED RELEASE ORAL at 08:24

## 2024-10-01 RX ADMIN — THIAMINE HCL TAB 100 MG 100 MG: 100 TAB at 08:23

## 2024-10-01 RX ADMIN — CARVEDILOL 6.25 MG: 3.12 TABLET, FILM COATED ORAL at 08:23

## 2024-10-01 RX ADMIN — ASPIRIN 81 MG: 81 TABLET, COATED ORAL at 08:23

## 2024-10-01 RX ADMIN — INSULIN GLARGINE 5 UNITS: 100 INJECTION, SOLUTION SUBCUTANEOUS at 08:20

## 2024-10-01 RX ADMIN — FLUDROCORTISONE ACETATE 0.1 MG: 0.1 TABLET ORAL at 08:23

## 2024-10-01 RX ADMIN — THERA TABS 1 TABLET: TAB at 08:24

## 2024-10-01 RX ADMIN — VENLAFAXINE HYDROCHLORIDE 37.5 MG: 37.5 CAPSULE, EXTENDED RELEASE ORAL at 08:24

## 2024-10-01 RX ADMIN — ACETAMINOPHEN 975 MG: 325 TABLET ORAL at 08:22

## 2024-10-01 RX ADMIN — DICLOFENAC SODIUM 2 G: 10 GEL TOPICAL at 08:19

## 2024-10-01 RX ADMIN — PREDNISONE 5 MG: 5 TABLET ORAL at 08:23

## 2024-10-01 RX ADMIN — FLUTICASONE FUROATE AND VILANTEROL TRIFENATATE 1 PUFF: 200; 25 POWDER RESPIRATORY (INHALATION) at 08:19

## 2024-10-01 RX ADMIN — TRAMADOL HYDROCHLORIDE 25 MG: 50 TABLET, COATED ORAL at 01:10

## 2024-10-01 RX ADMIN — TACROLIMUS 3 MG: 1 CAPSULE ORAL at 08:23

## 2024-10-01 RX ADMIN — ACYCLOVIR 400 MG: 400 TABLET ORAL at 08:22

## 2024-10-01 RX ADMIN — DAPSONE 50 MG: 25 TABLET ORAL at 08:22

## 2024-10-01 RX ADMIN — ACETAMINOPHEN 975 MG: 325 TABLET ORAL at 01:10

## 2024-10-01 ASSESSMENT — ACTIVITIES OF DAILY LIVING (ADL)
ADLS_ACUITY_SCORE: 50

## 2024-10-01 NOTE — PLAN OF CARE
"  Problem: Adult Inpatient Plan of Care  Goal: Plan of Care Review  Description: The Plan of Care Review/Shift note should be completed every shift.  The Outcome Evaluation is a brief statement about your assessment that the patient is improving, declining, or no change.  This information will be displayed automatically on your shift  note.  Outcome: Met  Flowsheets (Taken 10/1/2024 1211)  Plan of Care Reviewed With:   patient   spouse  Goal: Patient-Specific Goal (Individualized)  Description: You can add care plan individualizations to a care plan. Examples of Individualization might be:  \"Parent requests to be called daily at 9am for status\", \"I have a hard time hearing out of my right ear\", or \"Do not touch me to wake me up as it startles  me\".  Outcome: Met  Goal: Absence of Hospital-Acquired Illness or Injury  Outcome: Met  Intervention: Identify and Manage Fall Risk  Recent Flowsheet Documentation  Taken 10/1/2024 0836 by Jeevan Joy RN  Safety Promotion/Fall Prevention:   activity supervised   lighting adjusted   clutter free environment maintained  Intervention: Prevent Skin Injury  Recent Flowsheet Documentation  Taken 10/1/2024 1036 by Jeevan Joy RN  Body Position:   left   right   turned   supine  Taken 10/1/2024 0836 by Jeevan Joy RN  Body Position:   left   right   turned  Taken 10/1/2024 0636 by Jeevan Joy RN  Body Position: refuses positioning  Intervention: Prevent Infection  Recent Flowsheet Documentation  Taken 10/1/2024 0836 by Jeevan Joy RN  Infection Prevention: environmental surveillance performed  Goal: Optimal Comfort and Wellbeing  Outcome: Met  Intervention: Monitor Pain and Promote Comfort  Recent Flowsheet Documentation  Taken 10/1/2024 0822 by Jeevan Joy RN  Pain Management Interventions:   medication (see MAR)   repositioned   rest  Goal: Readiness for Transition of Care  Outcome: Met     Problem: Risk for Delirium  Goal: Improved Sleep  Outcome: Met     Problem: " Mobility Impairment  Goal: Optimal Mobility  Outcome: Met  Intervention: Optimize Mobility  Recent Flowsheet Documentation  Taken 10/1/2024 1036 by Jeevan Joy RN  Positioning/Transfer Devices:   in use   pillows  Taken 10/1/2024 0836 by Jeevan Joy RN  Assistive Device Utilized: lift device  Activity Management: activity encouraged  Positioning/Transfer Devices:   in use   pillows  Taken 10/1/2024 0822 by Jeevan Joy RN  Assistive Device Utilized: lift device  Activity Management:   activity encouraged   bedrest  Taken 10/1/2024 0636 by Jeevan Joy RN  Positioning/Transfer Devices:   in use   pillows     Problem: Comorbidity Management  Goal: Blood Glucose Levels Within Targeted Range  Outcome: Met  Intervention: Monitor and Manage Glycemia  Recent Flowsheet Documentation  Taken 10/1/2024 0836 by Jeevan Joy RN  Medication Review/Management: medications reviewed  Goal: Maintenance of Heart Failure Symptom Control  Outcome: Met  Intervention: Maintain Heart Failure Management  Recent Flowsheet Documentation  Taken 10/1/2024 0836 by Jeevan Joy RN  Medication Review/Management: medications reviewed  Goal: Blood Pressure in Desired Range  Outcome: Met  Intervention: Maintain Blood Pressure Management  Recent Flowsheet Documentation  Taken 10/1/2024 0836 by Jeevan Joy RN  Medication Review/Management: medications reviewed     Problem: VTE (Venous Thromboembolism)  Goal: Right Ventricular Function  Outcome: Met     Problem: Surgery Nonspecified  Goal: Absence of Bleeding  Outcome: Met  Goal: Absence of Infection Signs and Symptoms  Outcome: Met  Intervention: Prevent or Manage Infection  Recent Flowsheet Documentation  Taken 10/1/2024 0836 by Jeevan Joy RN  Isolation Precautions: contact precautions maintained  Goal: Optimal Pain Control and Function  Outcome: Met  Intervention: Prevent or Manage Pain  Recent Flowsheet Documentation  Taken 10/1/2024 0822 by Jeevan Joy RN  Pain Management  Interventions:   medication (see MAR)   repositioned   rest  Goal: Effective Urinary Elimination  Outcome: Met   Goal Outcome Evaluation:      Plan of Care Reviewed With: patient, spouse

## 2024-10-01 NOTE — PLAN OF CARE
Problem: Surgery Nonspecified  Goal: Absence of Bleeding  Outcome: Progressing   Goal Outcome Evaluation: Left BKA dressing C/D/I. No evidence of bleeding.     Problem: Pain Acute  Goal: Optimal Pain Control and Function  Intervention: Prevent or Manage Pain  Recent Flowsheet Documentation  Taken 10/1/2024 0112 by Ivanna Snyder RN  Medication Review/Management: medications reviewed  Intervention: Optimize Psychosocial Wellbeing  Recent Flowsheet Documentation  Taken 10/1/2024 0112 by Ivanna Snyder RN  Supportive Measures: active listening utilized  Denies pain.     Blood sugar=124. Stump protector on left BKA. Patient wanted a break from the protector on the right BKA. CMS intact. No complaints/problems.

## 2024-10-01 NOTE — PROGRESS NOTES
[facility] ACUTE INPATIENT PAIN SERVICE    [facility], [facility], [facility], [facility], [place]   PAIN PROGRESS      Assessment/Plan:  Aubrey Duncan is a 69 year old male who was admitted on 9/20/2024.  I was asked by Vinnie Nelson MD. Past medical history of  alpha-1 antitrypsin deficiency, prior HITS, type 2 diabetes, bilateral chronic limb-threatening ischemia status post recent right BKA, hypertension and GERD.    Admitted on 9/20/2024 due to worsening left foot and leg pain and was found to have new left acute distal popliteal artery and anterior tibial artery occlusion. CTA revealed extensive occlusion of both deep and superficial arteries in the left lower extremity. Vascular surgery service performed balloon angioplasty of the left anterior tibial artery on 9/21/2024. Findings included moderate stenosis of the distal left common femoral artery, new long-segment occlusion of the left superficial femoral artery, reconstitution of the left popliteal artery, and occluded left posterior tibial artery.       S/p BKA (left on 9/25 & right BKA was on 8/28). Reports he is recovering well and pain has been tolerable. Occasionally he will get phantom limb sensation. However, primary pain at this time is stump pain.     shows prescriptions of Hydromorphone and Gabapentin      Opioids used in the past 24h:  *(1) Tramadol 25mg     MME is 5    PLAN:  Acute on chronic left lower extremity pain.    Multimodal Medication Therapy:   Adjuvants:   - ASA 81mg daily  - APAP 650mg q6h prn  - Topical lidocaine  Opioids:  - Tramadol IR 25-50mg q6h changed to q4h prn - first line  - Discontinue IV Dilaudid 2mg max 3/day - second line  Non-medication interventions- Ice  Constipation Prophylaxis- Senna-S  Follow up /Discharge Recommendations - We recommend prescribing the following at the time of discharge:  Plans to discharge to rehab facility later today.               Subjective:  Aubrey is seen today in his bed alert and  oriented in no acute distress. Reports his pain is currently a 4/10 located in his left lower limb. Admits to occasional episodes of phantom limb sensation. Primary pain is stump pain. Tramadol has been providing good overall relief of his post-op and breakthrough pain. Plans to discharge to a rehab facility later today     Denies concerns with nausea, vomiting, constipation.      Reviewed continuing with current plan with medication changes mentioned above, all questions answered.          History   Drug Use No         Tobacco Use      Smoking status: Former        Packs/day: 0.00        Years: 2.0 packs/day for 15.0 years (30.0 ttl pk-yrs)        Types: Cigarettes        Start date: 1971        Quit date: 1986        Years since quittin.7        Passive exposure: Past      Smokeless tobacco: Never        Objective:  Vital signs in last 24 hours:  B/P: 180/105, T: 97.9, P: 106, R: 20   Blood pressure (!) 180/105, pulse 106, temperature 97.9  F (36.6  C), temperature source Oral, resp. rate 20, weight 64.8 kg (142 lb 13.7 oz), SpO2 95%.        Review of Systems:   As per subjective, all others negative.    Physical Exam    General: in no apparent distress and non-toxic   HEENT: Head normocephalic atraumatic, oral mucosa moist. Sclerae anicteric  CV: Regular rhythm, normal rate, no murmurs  Resp: No wheezes, no rales or rhonchi, no focal consolidations  GI: Normoactive bowel sounds  Skin: Surgical scars  Psych: Normal affect, mood euthymic  Neuro: Grossly normal          Imaging:  Personally Reviewed.    Results for orders placed or performed during the hospital encounter of 24   CTA Abdomen Pelvis Runoff w Contrast   Result Value Ref Range    Radiologist flags New diagnosis of pulmonary embolism (AA)     Impression    IMPRESSION:  1.  Acute segmental and subsegmental pulmonary emboli in the left lower lobe.    2.  Right lower extremity: Extensive atherosclerotic calcifications. The common iliac,  external iliac, internal iliac, and common femoral arteries are patent. The deep femoral artery is occluded. The superficial femoral artery is patent. The popliteal   artery is occluded. The proximal anterior tibial artery is occluded. The tibioperoneal trunk is occluded. There is reconstitution of the posterior tibial and peroneal arteries via collaterals. Below the knee amputation.    3.   Left lower extremity: The left common iliac and left external iliac arteries are patent. The left internal iliac artery is occluded with distal reconstitution. The left common femoral artery is patent. The left deep femoral artery is patent. The   left superficial femoral artery is occluded. There is reconstitution of the popliteal artery via collateral flow. The distal popliteal artery is occluded, new from recent angiogram. Evaluation of the anterior tibial, posterior tibial, and peroneal   arteries is limited due to extensive atherosclerotic calcifications. The tibioperoneal trunk is likely occluded. The anterior tibial artery appears occluded, new from recent angiogram. The peroneal artery is occluded proximally, new from recent   angiogram, with distal reconstitution, with flow to the level of the foot. The proximal posterior tibial artery is occluded with distal reconstitution, with flow to the level of the foot.    4.  Since April 2024, slight increase in size of a large cystic mass in the pancreas.      [Critical Result: New diagnosis of pulmonary embolism]    Finding was identified on 9/21/2024 12:10 AM CDT.     Dr. Venegas was contacted by me on 9/21/2024 12:28 AM CDT and verbalized understanding of the critical result.      IR Lower Extremity Angiogram Left    Impression    IMPRESSION:  1.  Calcified mild focal left common iliac artery stenosis and moderate focal calcified stenosis left external iliac artery, not treated at this setting. Chronically occluded left internal iliac artery.  2.  LEFT LEG: Severe calcified  ostial stenosis left PFA treated with 5 mm drug-coated balloon angioplasty. Acute occlusion of chronically and severely diseased left SFA with new occlusive diffuse dissection of the SFA and popliteal artery segment.   Successful stent reconstruction of the entire SFA and above-knee popliteal artery segment. In-line flow reestablished to the knee. Irregular dissection and/or thrombus in the distal popliteal artery, TP trunk and suspected microembolic occlusions with   chronically and severely diseased tibial vessels prompted thrombolytic infusion. Patient's foot remain severely ischemic below the ankle at this time. Plan for overnight thrombolytic infusion with follow-up angiographic assessment the following day.   Findings and plan discussed with patient's wife Kyung and Dr. Smiley Jay of vascular surgery.     IR Angiogram through catheter (arterial)    Impression    IMPRESSION:    1.  Left leg: There is in-line flow to the knee. Chronic severe trifurcation occlusive disease, not salvageable by endovascular means. Improved collateral perfusion left calf since previous day. Effectively no flow beyond the proximal foot. Lytic   infusion discontinued. Vascular surgery aware of findings and plan.     XR Chest Port 1 View    Impression    IMPRESSION: Left-sided PICC line terminates over the mid SVC. Heart size is stable. Lung volumes are low with bibasilar atelectasis. No overt failure or pneumothorax.   XR Shoulder 3 View Bilateral    Impression    IMPRESSION:   Right shoulder: No fracture or dislocation. Joint spaces are maintained. Surgical clips project over the mediastinum. Calcifications of the aortic arch.  Left shoulder: No fracture or dislocation. Joint spaces are maintained. Diffuse osseous demineralization. Left-sided PICC with tip near the superior cavoatrial junction.        Lab Results:  Personally Reviewed.   Last Comprehensive Metabolic Panel:  Sodium   Date Value Ref Range Status   09/26/2024 134 (L)  135 - 145 mmol/L Final   06/03/2021 140 134 - 144 mmol/L Final     Potassium   Date Value Ref Range Status   09/26/2024 4.2 3.4 - 5.3 mmol/L Final   10/26/2022 4.5 3.5 - 5.1 mmol/L Final   06/03/2021 4.6 3.5 - 5.1 mmol/L Final     Chloride   Date Value Ref Range Status   09/26/2024 101 98 - 107 mmol/L Final   06/03/2021 105 98 - 107 mmol/L Final     Chloride (External)   Date Value Ref Range Status   11/09/2022 110 (H) 98 - 107 mmol/L Final     Carbon Dioxide   Date Value Ref Range Status   06/03/2021 26 21 - 31 mmol/L Final     Carbon Dioxide (CO2)   Date Value Ref Range Status   09/26/2024 25 22 - 29 mmol/L Final   10/26/2022 28 21 - 31 mmol/L Final     Anion Gap   Date Value Ref Range Status   09/26/2024 8 7 - 15 mmol/L Final   10/26/2022 6 3 - 14 mmol/L Final   06/03/2021 9 3 - 14 mmol/L Final     Glucose   Date Value Ref Range Status   10/26/2022 92 70 - 105 mg/dL Final   06/03/2021 96 70 - 105 mg/dL Final     GLUCOSE BY METER POCT   Date Value Ref Range Status   10/01/2024 121 (H) 70 - 99 mg/dL Final     Urea Nitrogen   Date Value Ref Range Status   09/26/2024 20.7 8.0 - 23.0 mg/dL Final   10/26/2022 36 (H) 7 - 25 mg/dL Final   06/03/2021 38 (H) 7 - 25 mg/dL Final     Creatinine   Date Value Ref Range Status   09/26/2024 0.67 0.67 - 1.17 mg/dL Final   06/03/2021 1.29 0.70 - 1.30 mg/dL Final     GFR Estimate   Date Value Ref Range Status   09/26/2024 >90 >60 mL/min/1.73m2 Final     Comment:     eGFR calculated using 2021 CKD-EPI equation.   06/03/2021 56 (L) >60 mL/min/[1.73_m2] Final     GFR, ESTIMATED POCT   Date Value Ref Range Status   07/21/2022 59 (L) >60 mL/min/1.73m2 Final     Calcium   Date Value Ref Range Status   09/26/2024 7.3 (L) 8.8 - 10.4 mg/dL Final     Comment:     Reference intervals for this test were updated on 7/16/2024 to reflect our healthy population more accurately. There may be differences in the flagging of prior results with similar values performed with this method. Those prior  "results can be interpreted in the context of the updated reference intervals.   06/03/2021 8.9 8.6 - 10.3 mg/dL Final        UA: No results found for: \"UAMP\", \"UBARB\", \"BENZODIAZEUR\", \"UCANN\", \"UCOC\", \"OPIT\", \"UPCP\"           Please see A&P for additional details of medical decision making.    Isma Irving PA-C  Acute Care Pain Management  Team  Hours of pain coverage Mon-Fri 8-1600, afterhours please call the house officer    [employer] (RIZWANA, PATRICEs, SD, RH)   Page via [employer] web console -Click for Vocera           "

## 2024-10-01 NOTE — PLAN OF CARE
Goal Outcome Evaluation:    Pt is A&Ox4, assit x2 with lyn for transfers. LLE dressing intact, carrie stump protectors in place, no s/s of bleeding. Reproted pain in carrie shoulders, received tylenol and volteren which was effective. VSS, BG stable. Denied SOB, LS clear/diminished, sats WNL, no cough assessed. Pt received all scheduled medications. Pt is eating/drinking well, voiding WNL  Problem: Adult Inpatient Plan of Care  Goal: Optimal Comfort and Wellbeing  Outcome: Progressing     Problem: Risk for Delirium  Goal: Improved Sleep  Outcome: Progressing     Problem: Mobility Impairment  Goal: Optimal Mobility  Outcome: Progressing  Intervention: Optimize Mobility  Recent Flowsheet Documentation  Taken 9/30/2024 1701 by Sara La RN  Assistive Device Utilized: lift device  Activity Management: activity encouraged     Problem: Comorbidity Management  Goal: Blood Glucose Levels Within Targeted Range  Outcome: Progressing  Goal: Maintenance of Heart Failure Symptom Control  Outcome: Progressing  Goal: Blood Pressure in Desired Range  Outcome: Progressing     Problem: Surgery Nonspecified  Goal: Absence of Bleeding  Outcome: Progressing  Goal: Absence of Infection Signs and Symptoms  Outcome: Progressing  Intervention: Prevent or Manage Infection  Recent Flowsheet Documentation  Taken 9/30/2024 1701 by Sara La RN  Isolation Precautions: contact precautions maintained  Goal: Optimal Pain Control and Function  Outcome: Progressing  Goal: Effective Urinary Elimination  Outcome: Progressing     Problem: Adult Inpatient Plan of Care  Goal: Absence of Hospital-Acquired Illness or Injury  Intervention: Identify and Manage Fall Risk  Recent Flowsheet Documentation  Taken 9/30/2024 1701 by Sara La, RN  Safety Promotion/Fall Prevention:   activity supervised   assistive device/personal items within reach   patient and family education  Intervention: Prevent Skin Injury  Recent Flowsheet  Documentation  Taken 9/30/2024 1701 by Sara La, RN  Body Position: weight shifting  Intervention: Prevent Infection  Recent Flowsheet Documentation  Taken 9/30/2024 1701 by Sara La, RN  Infection Prevention:   equipment surfaces disinfected   hand hygiene promoted   personal protective equipment utilized   rest/sleep promoted   single patient room provided     Problem: Risk for Delirium  Goal: Improved Behavioral Control  Intervention: Minimize Safety Risk  Recent Flowsheet Documentation  Taken 9/30/2024 1701 by Sara La, RN  Enhanced Safety Measures:   assistive devices when indicated   pain management   patient/family teach back on injury risk

## 2024-10-01 NOTE — DISCHARGE SUMMARY
[facility]    Discharge Summary  Hospitalist    Date of Admission:  9/20/2024  Date of Discharge:  10/1/2024 12:54 PM  Discharging Provider: Cassie Lopes MD  Date of Service (when I saw the patient): 10/01/24    Discharge Diagnoses   Bilateral critical limb ischemia  PAD  Acute bilateral PE   IDDM    History of Present Illness   Aubrey Duncan is an 69 year old male who presented with left foot and leg pain    Hospital Course   Aubrey Duncan is a 69 year old male with history of double lung transplant due to alpha-1 antitrypsin deficiency, prior HITS, type 2 diabetes, bilateral chronic limb-threatening ischemia status post recent right BKA, hypertension and GERD admitted on 9/20/2024 due to worsening left foot and leg pain and was found to have new left acute distal popliteal artery and anterior tibial artery occlusion.       CTA revealed extensive occlusion of both deep and superficial arteries in the left lower extremity. Vascular surgery service performed balloon angioplasty of the left anterior tibial artery on 9/21/2024.  Notable operative findings include moderate stenosis of the distal left common femoral artery, new long-segment occlusion of the left superficial femoral artery, reconstitution of the left popliteal artery, and occluded left posterior tibial artery.       IR service was consulted by vascular surgery service for revascularization. Successful recanalization and reconstruction with stents was performed by IR on 9/21/2024 and patient was subsequently placed on tenecteplase. Interventional radiologist recommends tenecteplase overnight for left lower extremity with plan to hold argatroban and performed follow-up angiogram on 9/22.      Bilateral critical limb ischemia  PAD  -H/o bilateral chronic limb-threatening ischemia, previous right below-knee amputation, s/p outpatient LLE angiogram with left anterior tibial angioplasty, unable to advance balloon past distal AT  occlusion,   -readmitted with acute on chronic LLE ischemia  s/p 24 hrs catheter directed lytic therapy and left SFA stents placement.  -There remains three-vessel occlusion of the left calf demonstrated on his completion angiogram from 9/22/24.    -There is some flow to the foot is noted on by faint Doppler signals in the PT and peroneal at the ankle, however  his ischemic narrated to the left foot is likely permanent.     -9/25 : s/p left BKA  -SubQ fondaparinux, transition to argatroban gtt ( for h/o HIT and now acute PE )  after dressing change on 9/27  -On 9/30 stop Argatroban gtt and start on DOAC (Eliquis). Continue ASA and continue to hold Plavix.      Postop pain  -Was on Dilaudid PCA; still 8/10 pain, then hypoxia  -pain team: stop PCA on 9/27. Tramadol and iv dilaudid prn.  -pt feels better on 9/28        Acute blood loss anemia - post left BKA  Hb 7.5--7.4---7.5--7.7--6.9, monitor hb. Hgb 8.9 on 9/27  Plan to transfuse 1 unit prbc  Transfuse prn to keep hb > 7-8  Patient had some drop in bp after 1st unit prbc transfusion  Could be transfusion reaction ?       Acute bilateral PE   H/o HIT  -had h/o DVT years ago after leg surgery  --Hold argatroban for now as patient is receiving tenecteplase  -- Resume argatroban when off tenecteplase  --Hold Plavix while on argatroban.  --Unable to afford Plavix since the co-pay was $5000.  -9/26 :  on SubQ fondaparinux, transition to argatroban gtt ( for h/o HIT and now acute PE )  after dressing change on 9/27 - per vascular surgery, changed to Eliquis on 9/30.         Insulin-dependent diabetes  --Continue 5 units of Lantus and sliding scale insulin  -hgb A1c 4.2     History of double lung transplant due to alpha-1 antitrypsin deficiency  --Continue antirejection medications  -- Continue inhalers      Essential hypertension  -Hold Lasix due to possible procedure     GERD  -- Continue Protonix     Orthostatic hypotension  -- Continue PTA Froylan Matthews Sun,  MD    Significant Results and Procedures   See below    Pending Results   These results will be followed up by pcp, vascular surgeon  Unresulted Labs Ordered in the Past 30 Days of this Admission       Date and Time Order Name Status Description    9/26/2024  2:37 PM Transfusion Reaction Culture and Stain Preliminary     9/26/2024  2:37 PM Transfusion Reaction Pathology Evaluation In process     9/26/2024  8:44 AM Prepare red blood cells (unit) Preliminary     9/26/2024  8:44 AM Prepare red blood cells (unit) Preliminary     9/25/2024 12:54 PM Surgical Pathology Exam In process             Code Status   Full Code       Primary Care Physician   Hoa Olivarez    General.  Awake alert oriented not in acute distress.  HEENT.  Pupils equal round react to light, anicteric, EOM intact.  Neck supple no JVD.  CVS regular rhythm no murmur gallops.  Lungs.  Clear to auscultation bilateral no wheezing or rales.  Abdomen.  Soft nontender bowel sounds present.  Extremities.  B/l BKA.  Neurological.  Awake and alert. No focal deficit.  Skin no rash. No pallor.  Psych. Normal mood.      Discharge Disposition   Discharged to TCU  Condition at discharge: Good    Consultations This Hospital Stay   PHARMACY IP CONSULT  INTERVENTIONAL RADIOLOGY ADULT/PEDS IP CONSULT  CARE MANAGEMENT / SOCIAL WORK IP CONSULT  PHARMACY LIAISON FOR MEDICATION COVERAGE CONSULT  PAIN MANAGEMENT ADULT IP CONSULT  PHARMACY IP CONSULT  PHARMACY IP CONSULT  SPIRITUAL HEALTH SERVICES IP CONSULT  PHYSICAL THERAPY ADULT IP CONSULT  OCCUPATIONAL THERAPY ADULT IP CONSULT  VASCULAR ACCESS ADULT IP CONSULT  PHARMACY IP CONSULT  PHARMACY LIAISON FOR MEDICATION COVERAGE CONSULT  PHYSICAL THERAPY ADULT IP CONSULT  OCCUPATIONAL THERAPY ADULT IP CONSULT    Time Spent on this Encounter   I, Cassie Lopes MD, personally saw the patient today and spent greater than 30 minutes discharging this patient.    Discharge Orders      Brief Discharge Instructions    Angiogram  Discharge Instructions:  You had an angiogram procedure. An angiogram is a procedure that uses x-rays to take pictures of your blood vessels. A long, flexible tube or catheter is inserted through the blood stream (through the procedure site) to help deliver contrast (dye) into the arteries so they can be visible on the x-ray. Angiograms are used to evaluate possible blockages in the arterial system. Please follow the below instructions after your angiogram, including monitoring of your procedure site.    Care instructions after angiogram procedure:  -  If you received sedation for your procedure, do not drive or operate heavy machinery for the rest of the day.  -  Do not lift objects greater than 10 pounds for 3 days following angiogram procedure.  -  Avoid excessive exercise and straining for 3 days.   -  Avoid tub baths, pools, hot tubs and Jacuzzis for 3 days or until procedure site is well healed.   -  You may shower beginning tomorrow. Do not scrub procedure site until well healed; pat dry.  -  Return to your normal activities as you tolerate after the 3 day restriction.   -  You can expect to return to work 1-3 days after your procedure - depending on the nature of your profession.  -  It is normal to have some tenderness and minimal swelling at procedure puncture site. A small area of discoloration may be present. Tenderness typically subsides in 1-2 days. A small knot may also be present at puncture site for 6-8 weeks. This can be a normal part of the healing process.     Follow up:  - Follow up with your vascular surgeon or the ordering provider. [facility] may contact you to help arrange for additional follow up.    Please seek medical evaluation for:  - If you develop fevers (greater than 101 F (38.3C)).  - If you develop increasing pain, redness, purulent drainage, tenderness, or swelling at procedure site.   - If you experience any bleeding from procedure/puncture site: lie down, firmly apply  pressure to puncture site and call 911.  - Seek emergent evaluation if you experience any new leg/arm pain, discoloration or numbness.     Weight bearing status    Nonweight bearing to bilateral below-knee amputation stumps     Wound care (specify)    Site: Left below knee amputation:   Instructions: Cover wound with adaptic gauze, gauze fluffs, ABD pad. Secure with gently wrapped kerlix roll and ACE wrap.  Change every other day and as needed.  Okay to shower and pat dry.     Wound care    Site: Right below knee amputation:   Instructions: No dressing needed. Allow steristrips to fall off naturally. Use stump  sock. Okay to shower and pat dry. Do not soak either of your amputation wounds (no tub baths, swimming pools, hot tubs, or saunas).     Follow Up and recommended labs and tests    Follow-up in vascular surgery clinic in 4 weeks for wound check.    With general vascular surgery questions, concerns, or to request/change an appointment, please call the [facility] Clinic:    [facility]  Phone: [phone]    [address] [place], [address]     Additional Discharge Instructions    Please call the vascular surgery clinic if you experience new drainage, redness, bleeding, fever, or chills.    [facility]  Phone: [phone]    [id number] [place], [address]     General info for SNF    Length of Stay Estimate: Short Term Care: Estimated # of Days <30  Condition at Discharge: Improving  Level of care:skilled   Rehabilitation Potential: Fair  Admission H&P remains valid and up-to-date: Yes  Recent Chemotherapy: N/A  Use Nursing Home Standing Orders: Yes     Mantoux instructions    Give two-step Mantoux (PPD) Per Facility Policy Yes     Follow Up and recommended labs and tests    Follow up with jail physician.  The following labs/tests are recommended: cbc,  bmp.  Follow up with vascular surgeon as instructed     Reason for your hospital stay    PAD  Limb ischemia  DM  H/o lung transplant     Glucose monitor nursing POCT    Before meals and at bedtime     Activity - Up with assistive device     Activity - Up with nursing assistance     Physical Therapy Adult Consult    Evaluate and treat as clinically indicated.    Reason:  PAD, BKA     Occupational Therapy Adult Consult    Evaluate and treat as clinically indicated.    Reason:  PAD, BKA     Fall precautions     Diet    Follow this diet upon discharge: Current Diet:Orders Placed This Encounter      Snacks/Supplements Adult: Magic Cup; Between Meals      Snacks/Supplements Adult: Gelatein Plus; Between Meals      Regular Diet Adult     Discharge Medications   Current Discharge Medication List        START taking these medications    Details   apixaban ANTICOAGULANT (ELIQUIS) 5 MG tablet Take 1 tablet (5 mg) by mouth 2 times daily.    Associated Diagnoses: Other acute pulmonary embolism without acute cor pulmonale (H)      bisacodyl (DULCOLAX) 10 MG suppository Place 1 suppository (10 mg) rectally daily as needed for constipation (Use if magnesium hydroxide (MILK of MAGNESIA) is not effective after 24 hours. May discontinue if patient having bowel movement.).    Associated Diagnoses: Drug-induced constipation      magnesium hydroxide (MILK OF MAGNESIA) 400 MG/5ML suspension Take 30 mLs by mouth daily as needed for constipation (Use if polyethylene glycol (Miralax) is not effective after 24 hours.).    Associated Diagnoses: Drug-induced constipation      melatonin 1 MG TABS tablet Take 1 tablet (1 mg) by mouth nightly as needed for sleep.    Associated Diagnoses: Adjustment insomnia      senna-docusate (SENOKOT-S/PERICOLACE) 8.6-50 MG tablet Take 1 tablet by mouth 2 times daily.    Associated Diagnoses: Drug-induced constipation      thiamine (B-1) 100 MG tablet Take 1 tablet (100 mg) by mouth daily.    Associated  Diagnoses: Thiamine deficiency      traMADol 25 MG TABS tablet Take 1 tablet (25 mg) by mouth every 4 hours as needed for moderate pain.  Qty: 20 tablet, Refills: 0    Associated Diagnoses: Status post below-knee amputation of right lower extremity (H)           CONTINUE these medications which have CHANGED    Details   HYDROmorphone (DILAUDID) 2 MG tablet Take 0.5 tablets (1 mg) by mouth every 6 hours as needed for severe pain.  Qty: 15 tablet    Associated Diagnoses: PAD (peripheral artery disease) (H); Right foot pain; Open wound of toe, subsequent encounter           CONTINUE these medications which have NOT CHANGED    Details   acetaminophen (TYLENOL) 325 MG tablet Take 2 tablets (650 mg) by mouth every 6 hours as needed for mild pain  Qty: 60 tablet, Refills: 0    Associated Diagnoses: Intermittent claudication due to atherosclerosis of artery of extremity (H)      acyclovir (ZOVIRAX) 400 MG tablet TAKE 1 TABLET (400 MG) BY MOUTH 2 TIMES DAILY  Qty: 60 tablet, Refills: 3    Comments: We are filling last refill today and the patient is requesting authorization to refill once that supply has been used. Thank you!  Associated Diagnoses: PTLD (post-transplant lymphoproliferative disorder) (H)      albuterol (PROAIR HFA/PROVENTIL HFA/VENTOLIN HFA) 108 (90 Base) MCG/ACT inhaler Inhale 1-2 puffs into the lungs every 6 hours as needed for shortness of breath or wheezing  Qty: 8.5 g, Refills: 3    Comments: Pharmacy may dispense brand covered by insurance (Proair, or proventil or ventolin or generic albuterol inhaler)  Associated Diagnoses: Wheezing      aspirin (ASA) 81 MG EC tablet Take 1 tablet (81 mg) by mouth daily  Qty: 90 tablet, Refills: 3    Associated Diagnoses: Claudication (H)      azithromycin (ZITHROMAX) 250 MG tablet Take 250 mg by mouth Every Mon, Wed, Fri Morning      Calcium Carbonate-Vitamin D 600-10 MG-MCG TABS Take 1 tablet by mouth 2 times daily (with meals)  Qty: 60 tablet, Refills: 11     Associated Diagnoses: Lung transplant status, bilateral (H)      carvedilol (COREG) 6.25 MG tablet TAKE 1 TABLET (6.25 MG) BY MOUTH 2 TIMES DAILY (WITH MEALS)  Qty: 120 tablet, Refills: 3    Associated Diagnoses: Essential hypertension      dapsone (ACZONE) 25 MG tablet Take 2 tablets (50 mg) by mouth daily  Qty: 180 tablet, Refills: 3    Associated Diagnoses: Lung replaced by transplant (H)      diclofenac (VOLTAREN) 1 % topical gel Apply 2 g topically 2 times daily as needed for moderate pain      econazole nitrate 1 % external cream APPLY TOPICALLY DAILY TO FEET AND HEELS.  Qty: 85 g, Refills: 3    Associated Diagnoses: Tinea pedis of both feet; Diabetes mellitus with peripheral vascular disease (H); Type II or unspecified type diabetes mellitus with neurological manifestations, not stated as uncontrolled(250.60) (H); Skin fissure      Ferrous Sulfate Dried (HIGH POTENCY IRON) 65 MG TABS Take 1 tablet by mouth every morning.      fludrocortisone (FLORINEF) 0.1 MG tablet Take 1 tablet (0.1 mg) by mouth daily  Qty: 90 tablet, Refills: 3    Associated Diagnoses: Lung replaced by transplant (H); Hyperkalemia      fluticasone-salmeterol (ADVAIR-HFA) 230-21 MCG/ACT inhaler Inhale 2 puffs into the lungs 2 times daily  Qty: 8 g, Refills: 11    Associated Diagnoses: S/P lung transplant (H); Chronic rejection of allograft lung (H)      furosemide (LASIX) 20 MG tablet Take 1 tablet (20 mg) by mouth daily  Qty: 90 tablet, Refills: 4    Associated Diagnoses: Essential hypertension      !! insulin aspart (NOVOLOG FLEXPEN) 100 UNIT/ML pen Inject 5 Units subcutaneously daily (with breakfast). Do not give if blood sugar < 70 mg/dL.      !! insulin aspart (NOVOLOG PEN) 100 UNIT/ML pen Inject 3 Units subcutaneously 2 times daily (with meals). Lunch & supper      insulin glargine (LANTUS PEN) 100 UNIT/ML pen Inject 18 Units Subcutaneous every morning (before breakfast)    Comments: If Lantus is not covered by insurance, may  substitute Basaglar or Semglee or other insulin glargine product per insurance preference at same dose and frequency.    Associated Diagnoses: Diabetes mellitus type 2, diet-controlled (H)      loperamide (IMODIUM) 2 MG capsule Take 1 capsule (2 mg) by mouth 4 times daily as needed for diarrhea  Qty: 120 capsule, Refills: 12    Associated Diagnoses: Lung transplant status, bilateral (H)      magnesium gluconate (MAGONATE) 500 MG tablet Take 1 tablet (500 mg) by mouth at bedtime.    Associated Diagnoses: Hypomagnesemia      metoclopramide (REGLAN) 5 MG tablet Take 1 tablet (5 mg) by mouth every 6 hours as needed (nausea)  Qty: 30 tablet, Refills: 0    Associated Diagnoses: Nausea; Diffuse large B-cell lymphoma of solid organ excluding spleen      montelukast (SINGULAIR) 10 MG tablet Take 1 tablet (10 mg) by mouth every evening  Qty: 90 tablet, Refills: 3    Associated Diagnoses: Lung replaced by transplant (H)      multivitamin, therapeutic (THERA-VIT) TABS Take 1 tablet by mouth daily  Qty: 30 tablet, Refills: 12    Associated Diagnoses: Lung transplant status, bilateral (H)      pantoprazole (PROTONIX) 40 MG EC tablet Take 1 tablet (40 mg) by mouth daily  Qty: 90 tablet, Refills: 1    Associated Diagnoses: Gastroesophageal reflux disease, unspecified whether esophagitis present      !! predniSONE (DELTASONE) 5 MG tablet Take 5 mg by mouth every morning. In addition, take one half tablet (2.5 mg) in the evening      !! predniSONE (DELTASONE) 5 MG tablet Take 2.5 mg by mouth every evening. In addition, take 1 tablet (5 mg) in the morning.      psyllium (METAMUCIL/KONSYL) capsule Take 2 capsules by mouth 2 times daily.    Comments: Pharmacy to dispense capsule size that is available.  Associated Diagnoses: Diarrhea, unspecified type      rosuvastatin (CRESTOR) 40 MG tablet Take 20 mg by mouth Every Mon, Wed, Fri Morning      tacrolimus (GENERIC EQUIVALENT) 1 MG capsule Take 3 capsules (3 mg) by mouth 2 times daily.  Total dose: 3 mg in AM and 3 mg in PM  Qty: 540 capsule, Refills: 3    Comments: TXP DT 9/8/2013 (Lung) TXP Dischg DT 10/4/2013 DX Lung replaced by transplant Z94.2 TX Center [facility], [place] ([place], [place])  Associated Diagnoses: S/P lung transplant (H)      venlafaxine (EFFEXOR XR) 37.5 MG 24 hr capsule Take 37.5 mg by mouth daily.      blood glucose (NO BRAND SPECIFIED) test strip USE TO TEST BLOOD SUGAR 3 TIMES DAILY. DIAG CODE: E11.9  Qty: 300 strip, Refills: 3    Associated Diagnoses: Type 2 diabetes mellitus with diabetic polyneuropathy, with long-term current use of insulin (H)      insulin pen needle (32G X 4 MM) 32G X 4 MM miscellaneous Use 4 pen needles daily or as directed. Dispense as insurance allows. Dx. Code: E09.9  Qty: 400 each, Refills: 11    Associated Diagnoses: Diabetes mellitus type 2, diet-controlled (H)      Microlet Lancets MISC CHECK BLOOD SUGAR FOUR TIMES DAILY E11.9  Qty: 400 each, Refills: 1    Comments: Pt states he tests3 times a day. Testing above 3 times a dayrequires extra documentation to bill MEDICARE. Might you be willing to send a new RX for TID testing? PLEASE AND THANKS!  Associated Diagnoses: Diabetes mellitus type 2, diet-controlled (H)      order for DME Equipment being ordered: diabetic shoes  Qty: 1 each, Refills: 0    Associated Diagnoses: Diabetes mellitus type 2, diet-controlled (H)      wound support modular (EXPEDITE) LIQD bottle Take 60 mLs by mouth daily.    Associated Diagnoses: PAD (peripheral artery disease) (H)       !! - Potential duplicate medications found. Please discuss with provider.        STOP taking these medications       clopidogrel (PLAVIX) 75 MG tablet Comments:   Reason for Stopping:             Allergies   Allergies   Allergen Reactions    Heparin Heparin Induced Thrombocytopenia    Oxycodone Confusion     Significant lethargy. Tolerates Dilaudid well.     Fluocinolone Other (See Comments)     Tendon problems    "   Gabapentin Nausea and Vomiting    Levaquin Muscle Pain (Myalgia)    Pneumococcal Vaccine Swelling     Fever and \"My arm swelled up like a balloon.\"    Varicella Zoster Immune Globulin Swelling     Data   Most Recent 3 CBC's:  Recent Labs   Lab Test 10/01/24  0510 09/30/24  0610 09/29/24  0435   WBC 8.4 8.1 8.6   HGB 9.3* 9.0* 8.4*   MCV 96 96 96    372 269      Most Recent 3 BMP's:  Recent Labs   Lab Test 10/01/24  1146 10/01/24  0748 10/01/24  0221 09/26/24  0817 09/26/24  0435 09/23/24  0644 09/23/24  0334 09/21/24  0215 09/20/24 2024   NA  --   --   --   --  134*  --  134*  --  142   POTASSIUM  --   --   --   --  4.2  --  3.8  --  4.0   CHLORIDE  --   --   --   --  101  --  103  --  107   CO2  --   --   --   --  25  --  22  --  25   BUN  --   --   --   --  20.7  --  28.1*  --  26.6*   CR  --   --   --   --  0.67  --  0.91  --  0.89   ANIONGAP  --   --   --   --  8  --  9  --  10   ERIN  --   --   --   --  7.3*  --  7.4*  --  7.5*   * 121* 124*   < > 94   < > 113*   < > 113*    < > = values in this interval not displayed.     Most Recent 2 LFT's:  Recent Labs   Lab Test 09/27/24  1746 09/20/24 2024   AST 52* 61*   ALT 15 38   ALKPHOS 142 117   BILITOTAL 0.4 0.4     Most Recent INR's and Anticoagulation Dosing History:  Anticoagulation Dose History  More data exists         Latest Ref Rng & Units 7/21/2022 1/9/2024 2/16/2024 3/21/2024 9/21/2024 9/22/2024 9/30/2024   Recent Dosing and Labs   INR 0.85 - 1.15 1.07  1.03  0.99  1.03  1.45  2.50  1.54  1.56  1.58       Details          Multiple values from one day are sorted in reverse-chronological order             Most Recent 3 Troponin's:  Recent Labs   Lab Test 02/24/19  1039   TROPI <0.030     Most Recent Cholesterol Panel:  Recent Labs   Lab Test 04/03/24  0816   CHOL 146   LDL 47   HDL 42   TRIG 284*     Most Recent 6 Bacteria Isolates From Any Culture (See EPIC Reports for Culture Details):  Recent Labs   Lab Test 07/06/21  1101 11/27/20  1430 " 10/16/20  0939 09/11/20  0818 07/16/20  0856 07/02/20  1042   CULT Heavy growth  Klebsiella pneumoniae  *  Heavy growth  Pseudomonas aeruginosa  * Moderate growth  Pseudomonas aeruginosa  * Heavy growth  Pseudomonas aeruginosa  Some antibiotics have been suppressed due to the known inducible beta lactamase activity.   Please contact Microbiology for more information.  * Heavy growth  Escherichia coli  *  Heavy growth  Enterococcus faecalis  * Moderate growth  Stenotrophomonas maltophilia  * No growth after 6 days  No growth after 6 days     Most Recent TSH, T4 and A1c Labs:  Recent Labs   Lab Test 08/25/24  1632 01/11/24  1631 06/28/23  0951   TSH  --   --  0.86   A1C 4.2   < >  --     < > = values in this interval not displayed.     Results for orders placed or performed during the hospital encounter of 09/20/24   CTA Abdomen Pelvis Runoff w Contrast     Value    Radiologist flags New diagnosis of pulmonary embolism (AA)    Narrative    EXAM: CTA ABDOMEN PELVIS RUNOFF W CONTRAST  LOCATION: [facility]  DATE: 9/20/2024    INDICATION: Left leg pain, pallor, cool after angiogram  COMPARISON: CT chest August 30, 2024, CT chest, abdomen, and pelvis 4/13/2024, 9/20/2024 left lower extremity angiogram  TECHNIQUE: Helical acquisition through the abdomen, pelvis, and bilateral lower extremities was performed during the arterial phase of contrast enhancement using IV Contrast. 2D and 3D reconstructions were performed by the CT technologist. Dose reduction   techniques were used.   CONTRAST: isovue 370 90ml    FINDINGS:  AORTA: Severe calcific atherosclerosis.    RIGHT LEG: Extensive atherosclerotic calcifications. The common iliac, external iliac, internal iliac, and common femoral arteries are patent. The deep femoral artery is occluded. The superficial femoral artery is patent. The popliteal artery is   occluded. The proximal anterior tibial artery is occluded. The tibioperoneal trunk is  occluded. There is reconstitution of the posterior tibial and peroneal arteries via collaterals. Below the knee amputation.    LEFT LEG: The left common iliac and left external iliac arteries are patent. The left internal iliac artery is occluded with distal reconstitution. The left common femoral artery is patent. The left deep femoral artery is patent. The left superficial   femoral artery is occluded. There is reconstitution of the popliteal artery via collateral flow. The distal popliteal artery is occluded. Evaluation of the anterior tibial, posterior tibial, and peroneal arteries is limited due to extensive   atherosclerotic calcifications. The tibioperoneal trunk is likely occluded. The anterior tibial artery is likely occluded. The peroneal artery is occluded proximally with distal reconstitution, with flow to the level of the foot. The proximal posterior   tibial artery is occluded with distal reconstitution, with flow to the level of the foot.    LUNG BASES: Acute segmental and subsegmental pulmonary emboli in the left lower lobe.    ABDOMEN: Unremarkable liver and gallbladder. Unremarkable spleen. Slight increase in size of a large cystic mass in the proximal pancreas measuring 4.3 x 5.2 cm, previously 3.8 x 4.9 cm on 4/13/2024. Unremarkable adrenal glands and kidneys. The bowel   appears unremarkable. No ascites. No lymphadenopathy.    PELVIS: Unremarkable bladder and prostate. No pelvic free fluid or lymphadenopathy.      Impression    IMPRESSION:  1.  Acute segmental and subsegmental pulmonary emboli in the left lower lobe.    2.  Right lower extremity: Extensive atherosclerotic calcifications. The common iliac, external iliac, internal iliac, and common femoral arteries are patent. The deep femoral artery is occluded. The superficial femoral artery is patent. The popliteal   artery is occluded. The proximal anterior tibial artery is occluded. The tibioperoneal trunk is occluded. There is  reconstitution of the posterior tibial and peroneal arteries via collaterals. Below the knee amputation.    3.   Left lower extremity: The left common iliac and left external iliac arteries are patent. The left internal iliac artery is occluded with distal reconstitution. The left common femoral artery is patent. The left deep femoral artery is patent. The   left superficial femoral artery is occluded. There is reconstitution of the popliteal artery via collateral flow. The distal popliteal artery is occluded, new from recent angiogram. Evaluation of the anterior tibial, posterior tibial, and peroneal   arteries is limited due to extensive atherosclerotic calcifications. The tibioperoneal trunk is likely occluded. The anterior tibial artery appears occluded, new from recent angiogram. The peroneal artery is occluded proximally, new from recent   angiogram, with distal reconstitution, with flow to the level of the foot. The proximal posterior tibial artery is occluded with distal reconstitution, with flow to the level of the foot.    4.  Since April 2024, slight increase in size of a large cystic mass in the pancreas.      [Critical Result: New diagnosis of pulmonary embolism]    Finding was identified on 9/21/2024 12:10 AM CDT.     Dr. Venegas was contacted by me on 9/21/2024 12:28 AM CDT and verbalized understanding of the critical result.      IR Lower Extremity Angiogram Left    [facility] RADIOLOG[facility]  LOCATION: [facility]  DATE: 9/21/2024    PROCEDURE:   1.  ABDOMINAL AORTOGRAM   2.  LEFT LOWER EXTREMITY ANGIOGRAM  3.  LEFT LOWER EXTREMITY THIRD ORDER CATHETERIZATION WITH ADDITIONAL SECOND/THIRD ORDER CATHETERIZATION  4.  SELECTIVE ANGIOGRAM LEFT PROFUNDA FEMORAL ARTERY  5.  SELECTIVE ANGIOGRAM LEFT PERONEAL ARTERY  6.  PTA AND STENT LEFT SFA  7.  PTA AND STENT LEFT POPLITEAL ARTERY  8.  DRUG-COATED BALLOON ANGIOPLASTY LEFT PROFUNDA FEMORAL ARTERY  9.  ARTERIAL THROMBOLYTIC  INFUSION, INITIAL DAY  10.  CONSCIOUS SEDATION    INTERVENTIONAL RADIOLOGIST: Eduardo Dewey MD    INDICATION: Severe peripheral vascular disease. 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 Acute critical left limb ischemia following left lower extremity arterial interventions the previous day. New rest pain left foot. Patient currently on Argatroban   infusion.    MODERATE SEDATION: Versed 4.5 mg IV; Fentanyl 225 mcg IV. During the time out, immediately prior to the administration of medications, the patient was reassessed for adequacy to receive conscious sedation.  Under physician supervision, Versed and   fentanyl were administered for moderate sedation. Pulse oximetry, heart rate and blood pressure were continuously monitored by an independent trained observer. The physician spent 140 minutes of face-to-face sedation time with the patient.    CONTRAST: 200 cc Visipaque 320  ANTIBIOTICS: 2 g of IV Ancef  ADDITIONAL MEDICATIONS: TPA 2 mg intra-arterial left popliteal artery, Zofran 4 mg IV    FLUOROSCOPIC TIME: 49.8 minutes.  RADIATION DOSE: Air Kerma: 1341 mGy.    COMPLICATIONS: No immediate complications.    STERILE BARRIER TECHNIQUE: Maximum sterile barrier technique was used. Cutaneous antisepsis was performed at the operative site with application of 2% chlorhexidine and large sterile drape. Prior to the procedure, the  and assistant performed   hand hygiene and wore hat, mask, sterile gown, and sterile gloves during the entire procedure.    PROCEDURE:   Both groins were prepped and draped in usual sterile fashion followed by localization with 1% Xylocaine. Ultrasound revealed a patent right common femoral artery. An ultrasound image was obtained and placed in the patient's permanent medical record.   Using real-time ultrasound for needle placement and a micropuncture access system, the right common femoral artery was accessed followed by placement of a 5 Botswanan sheath. A 5 Botswanan Omni Flush catheter was  advanced into the upper abdominal aorta   followed by a flush abdominal aortogram. The catheter was pulled back into the distal abdominal aorta followed by a flush pelvic angiogram. The catheter and wire were advanced into the contralateral left common femoral artery followed by left lower   extremity angiography.    A 6 Chinese contralateral sheath was placed. Catheter wire advanced into the occluded left SFA. Occlusive dissection was encountered. The catheter was pulled back redirected into the diseased profunda femoral artery followed by left profundofemoral artery   angiography. The catheter was redirected back into the occluded SFA. Wire and catheter used to navigate through the occluded dissected SFA. Axis of was obtained to the diseased popliteal artery. Dissection and/or thrombus encountered. The SFA occlusion   was predilated using a 4 mm balloon. Drug-eluting stent reconstruction of the SFA from the above-knee popliteal artery segment through the origin of the SFA was performed with 3 overlapping 6 mm [place]ona drug-eluting stents. All SFA stents were postdilated   using a 5 mm balloon. Follow-up angiography was performed. Flow-limiting thrombus and/or dissection of the popliteal artery was then treated with a metal 5 x 60 mm Protege stent. Follow-up left lower artery angiography was performed. On a wire access   was secured in the left profunda femoral artery. 5 mm drug-coated balloon angioplasty was then performed using an IN.PACT Admiral drug-coated balloon. Follow-up profundofemoral artery angiography was performed. Access to the profunda femoral artery was   abandoned. Wire and catheter access were then again obtained to the left popliteal artery. More detailed angiography of the below-knee popliteal artery and diseased tibial vessels was performed. A catheters advanced selectively into the peroneal artery.   Selective peroneal artery angiography was performed. Attempts were made to cross the chronic  segmental occlusion of the peroneal artery without success. A 5 Japanese enteral KMP catheter was advanced into the above-knee popliteal artery. Tenecteplase   thrombolytic infusion was initiated at 0.15 mg per hour in attempt to resolve any nonocclusive or occlusive microemboli tibial vasculature.    FINDINGS:  ABDOMEN AND PELVIS: Normal caliber calcified abdominal aorta. Both renal arteries are widely patent. The common and external iliac arteries are patent. There is bulky calcified plaque and mild left common iliac artery stenosis and moderate mid left   external iliac artery stenosis. Left internal iliac arteries chronically occluded. The right internal iliac artery is patent    LEFT LOWER EXTREMITY: Calcified mildly stenotic CFA. Bulky calcified plaque and severe ostial stenosis of the PFA. The SFA is occluded from its origin throughout the thigh with short diseased segment reconstituted at the distal thigh. The popliteal   artery is occluded. At baseline, no effective flow below the knee joint.    PROFUNDOFEMORAL ARTERY ANGIOGRAM: Diffusely calcified with bulky severe calcified plaque and stenosis at its origin. This is the primary inflow to the leg at this point prior to intervention.    PERONEAL ARTERY ANGIOGRAM: Diffusely calcified with segmental acute occlusion at the mid calf. Unable to cross occlusion.    DRUG-COATED BALLOON ANGIOPLASTY LEFT PROFUNDA FEMORAL ARTERY: Severe calcified ostial stenosis of the left PFA is treated with 5 mm drug-coated balloon angioplasty.    PTA AND STENT LEFT FEMORAL-POPLITEAL ARTERY: Dissected and occluded chronically diseased SFA successfully crossed and reconstructed with 3 overlapping drug-eluting stents postdilated to 5 mm. Flow-limiting dissection and/or thrombus above-knee popliteal   artery segment treated with bare-metal self-expanding 5 x 60 mm stent. In-line flow reestablished to the knee.    ARTERIAL THROMBOLYTIC INFUSION, INITIAL DAY: Tenecteplase thrombolytic  infusion initiated through an 12 catheter in the above-knee popliteal artery segment to treat suspected microembolic occlusions superimposed upon severe tibial occlusive disease and   residual suspected adherent thrombus in the distal popliteal artery and TP trunk.      Impression    IMPRESSION:  1.  Calcified mild focal left common iliac artery stenosis and moderate focal calcified stenosis left external iliac artery, not treated at this setting. Chronically occluded left internal iliac artery.  2.  LEFT LEG: Severe calcified ostial stenosis left PFA treated with 5 mm drug-coated balloon angioplasty. Acute occlusion of chronically and severely diseased left SFA with new occlusive diffuse dissection of the SFA and popliteal artery segment.   Successful stent reconstruction of the entire SFA and above-knee popliteal artery segment. In-line flow reestablished to the knee. Irregular dissection and/or thrombus in the distal popliteal artery, TP trunk and suspected microembolic occlusions with   chronically and severely diseased tibial vessels prompted thrombolytic infusion. Patient's foot remain severely ischemic below the ankle at this time. Plan for overnight thrombolytic infusion with follow-up angiographic assessment the following day.   Findings and plan discussed with patient's wife Kyung and Dr. Smiley Jay of vascular surgery.     IR Angiogram through catheter (arterial)    Narrative    [facility]  LOCATION: [facility]  DATE: 9/22/2024    PROCEDURE:   1.  ARTERIAL THROMBOLYTIC INFUSION, SUBSEQUENT DAY WITH CATHETER REMOVAL  2.  VASCULAR CLOSURE DEVICE  3.  CONSCIOUS SEDATION    INTERVENTIONAL RADIOLOGIST: Eduardo Dewey MD    INDICATION: 20 hour arterial thrombolytic follow-up for left lower extremity critical limb ischemia. Ischemic left foot.    SEDATION: Versed 1.5 mg. Fentanyl 50 mcg. The procedure was performed with administration intravenous conscious sedation with  appropriate preoperative, intraoperative, and postoperative evaluation. During the timeout, immediately prior to administration   of medications, the patient was reassessed for adequacy to receive conscious sedation.  15 minutes of supervised face to face conscious sedation time was provided by a radiology nurse under my direct supervision.    ANTIBIOTICS: None  Air Kerma: 178 mGy  FLUOROSCOPIC TIME: 1.6 minute   CONTRAST: 30 cc Visipaque 320    COMPLICATIONS: No immediate complications.    PROCEDURE:   Follow-up left lower extremity angiography was performed through the infusion catheter as well as the contralateral sheath. Both groins were prepped and draped in usual sterile fashion. Local anesthesia was obtained of the right groin with 1% Xylocaine.   The contralateral sheath and wire access was abandoned. Wire access was secured in the abdominal aorta. The contralateral sheath was exchanged for an ipsilateral 6 Sami sheath. A right femoral angiogram was performed. Right femoral access was removed   with hemostasis obtained with a single Proglide closure device.    FINDINGS:   Left leg: The left common, external and common femoral arteries are patent. Patent PFA. Patent stented SFA and above-knee popliteal artery. Patent distal popliteal artery. Improved collateral perfusion of the calf through the chronically and severely   diseased tibial vasculature. Peroneal runoff which is severely diseased with segmental occlusion at the mid calf segment. Scant flow seen to the calcaneal region. No flow beyond the proximal foot.      Impression    IMPRESSION:    1.  Left leg: There is in-line flow to the knee. Chronic severe trifurcation occlusive disease, not salvageable by endovascular means. Improved collateral perfusion left calf since previous day. Effectively no flow beyond the proximal foot. Lytic   infusion discontinued. Vascular surgery aware of findings and plan.     POC US Guidance Needle Placement    Narrative  "   Ultrasound was performed as guidance to an anesthesia procedure.  Click   \"PACS images\" hyperlink below to view any stored images.  For specific   procedure details, view procedure note authored by anesthesia.   POC US Guidance Needle Placement    Narrative    Ultrasound was performed as guidance to an anesthesia procedure.  Click   \"PACS images\" hyperlink below to view any stored images.  For specific   procedure details, view procedure note authored by anesthesia.   XR Chest Port 1 View    Narrative    EXAM: XR CHEST PORT 1 VIEW  LOCATION: [facility]  DATE: 9/26/2024    INDICATION: RN placed PICC   verify tip placement  COMPARISON: 8/29/2024      Impression    IMPRESSION: Left-sided PICC line terminates over the mid SVC. Heart size is stable. Lung volumes are low with bibasilar atelectasis. No overt failure or pneumothorax.   XR Shoulder 3 View Bilateral    Narrative    EXAM: XR SHOULDER BILATERAL 3 VIEWS  LOCATION: [facility]  DATE: 9/27/2024    INDICATION: bilateral shoulder pain, pos empty can test  COMPARISON: None.      Impression    IMPRESSION:   Right shoulder: No fracture or dislocation. Joint spaces are maintained. Surgical clips project over the mediastinum. Calcifications of the aortic arch.  Left shoulder: No fracture or dislocation. Joint spaces are maintained. Diffuse osseous demineralization. Left-sided PICC with tip near the superior cavoatrial junction.     *Note: Due to a large number of results and/or encounters for the requested time period, some results have not been displayed. A complete set of results can be found in Results Review.       "

## 2024-10-01 NOTE — PROGRESS NOTES
Care Management Discharge Note    Discharge Date: 10/01/2024       Discharge Disposition: Acute Rehab, Transitional Care    Discharge Services: None    Discharge DME:      Discharge Transportation:      Private pay costs discussed: transportation costs    Does the patient's insurance plan have a 3 day qualifying hospital stay waiver?  No    PAS Confirmation Code: [id number]  Patient/family educated on Medicare website which has current facility and service quality ratings: yes    Education Provided on the Discharge Plan: Yes  Persons Notified of Discharge Plans: patient and wife   Patient/Family in Agreement with the Plan: yes    Handoff Referral Completed: No, handoff not indicated or clinically appropriate    Additional Information:  See below     Alma Dunn RN        Noted that patient requiring lyn lift for transfers. Called wife. She agrees that wheelchair transport would be safer. Agrees to potential OOP cost.     Transport arranged for [phone] via [employer] wheelchair . Met with patient and discussed with nurse Chew. Both feel patient can tolerate wheelchair transport     Discharge orders faxed to [facility]     Spoke to Jonna at [facility] and verified they can accept patient today as planned

## 2024-10-01 NOTE — PLAN OF CARE
Physical Therapy Discharge Summary    Reason for therapy discharge:    Discharged to transitional care facility.    Progress towards therapy goal(s). See goals on Care Plan in [facility] electronic health record for goal details.  Goals partially met.  Barriers to achieving goals:   discharge from facility.    Therapy recommendation(s):    Continued therapy is recommended.  Rationale/Recommendations:  PT at TCU to improve functional mobility.

## 2024-10-03 ENCOUNTER — TELEPHONE (OUTPATIENT)
Dept: TRANSPLANT | Facility: CLINIC | Age: 69
End: 2024-10-03
Payer: MEDICARE

## 2024-10-03 NOTE — TELEPHONE ENCOUNTER
Date of Admission:  9/20/2024  Date of Discharge:  10/1/2024    admitted on 9/20/2024 due to worsening left foot and leg pain and was found to have new left acute distal popliteal artery and anterior tibial artery occlusion.     CTA revealed extensive occlusion of both deep and superficial arteries in the left lower extremity. Vascular surgery service performed balloon angioplasty of the left anterior tibial artery on 9/21/2024.  Notable operative findings include moderate stenosis of the distal left common femoral artery, new long-segment occlusion of the left superficial femoral artery, reconstitution of the left popliteal artery, and occluded left posterior tibial artery.       IR service was consulted by vascular surgery service for revascularization. Successful recanalization and reconstruction with stents was performed by IR on 9/21/2024 and patient was subsequently placed on tenecteplase. Interventional radiologist recommends tenecteplase overnight for left lower extremity with plan to hold argatroban and performed follow-up angiogram on 9/22.         Pt currently staying at [facility]. Phone [phone] Fax: [phone]  Pt reports he is pretty much bed bound right now. Is going to start working on using slide board. Pt has not been fit for a prosthetic yet. Hopefully in the next couple days. Pt states he has been feeling really well from a lung perspective. Needs to start therapy   Orders faxed for monthly labs

## 2024-10-03 NOTE — LETTER
PHYSICIAN ORDERS      DATE & TIME ISSUED: 2024 9:33 AM  PATIENT NAME: Aubrey Duncan   : 1955     [facility] MR# [if applicable]: [id number]     DIAGNOSIS:  Lung Transplant  Z94.2    Order to draw monthly tacrolimus level, cmp, mag, and cbc with platelets.     Tacrolimus level MUST be a 12 hour trough (please hold morning tacrolimus dose until after patient has had labs drawn)    Any questions please call: Devante at transplant office [phone]    Please fax these results to [phone].         Alma Murphy MD  Pulmonary Disease

## 2024-10-04 ENCOUNTER — TELEPHONE (OUTPATIENT)
Dept: TRANSPLANT | Facility: CLINIC | Age: 69
End: 2024-10-04
Payer: MEDICARE

## 2024-10-04 NOTE — TELEPHONE ENCOUNTER
Lab orders faxed x2 and email sent to [facility] at [place] with standing lab orders

## 2024-10-04 NOTE — TELEPHONE ENCOUNTER
Patient Call: General  Route to LPN    Reason for call: Delma from [facility]   called in regards of patient's labs and returning call back. DON's #: [phone] & Facility number:[phone]       Call back needed? Yes    Return Call Needed  Same as documented in contacts section  When to return call?: Same day: Route High Priority

## 2024-10-10 LAB
BACTERIA BLD CULT: NO GROWTH
GRAM STAIN RESULT: NORMAL

## 2024-10-11 PROCEDURE — 88307 TISSUE EXAM BY PATHOLOGIST: CPT | Mod: 26 | Performed by: PATHOLOGY

## 2024-10-11 PROCEDURE — 88311 DECALCIFY TISSUE: CPT | Mod: 26 | Performed by: PATHOLOGY

## 2024-10-15 ENCOUNTER — TELEPHONE (OUTPATIENT)
Dept: FAMILY MEDICINE | Facility: OTHER | Age: 69
End: 2024-10-15

## 2024-10-15 NOTE — TELEPHONE ENCOUNTER
Form received from [employer], regarding Novolog Flex pen. Chart accessed to gather information needed to fill out form. [facility]     Patient information section filled out.     No past prescriptions found for Novolog. Historically reported. Will leave prescriber section blank and route to provider, to fill out if approved.    insulin aspart (NOVOLOG FLEXPEN) 100 UNIT/ML pen         5 Units, DAILY WITH BREAKFAST   Summary: Inject 5 Units subcutaneously daily (with breakfast). Do not give if blood sugar < 70 mg/dL., Historical        insulin aspart (NOVOLOG PEN) 100 UNIT/ML pen         3 Units, 2 TIMES DAILY WITH MEALS   Summary: Inject 3 Units subcutaneously 2 times daily (with meals). Lunch & supper, Historical        Not pended. Please review and order as appropriate. Last prescription by Hoa Olivarez was sent to [id number].    Brooke Nicholas RN .............. 10/15/2024  11:59 AM

## 2024-10-16 NOTE — TELEPHONE ENCOUNTER
Form faxed to [facility] and sent to be scanned into Pt chart. Brooke Nicholas RN .............. 10/16/2024  2:05 PM

## 2024-10-24 ENCOUNTER — HOSPITAL ENCOUNTER (INPATIENT)
Facility: CLINIC | Age: 69
LOS: 2 days | Discharge: OTHER INSTITUTION | DRG: 871 | End: 2024-10-26
Attending: FAMILY MEDICINE | Admitting: FAMILY MEDICINE
Payer: MEDICARE

## 2024-10-24 DIAGNOSIS — T81.30XA WOUND DEHISCENCE: Primary | ICD-10-CM

## 2024-10-24 DIAGNOSIS — T81.31XD WOUND DEHISCENCE, SURGICAL, SUBSEQUENT ENCOUNTER: ICD-10-CM

## 2024-10-24 PROCEDURE — 258N000003 HC RX IP 258 OP 636

## 2024-10-24 PROCEDURE — 99223 1ST HOSP IP/OBS HIGH 75: CPT

## 2024-10-24 PROCEDURE — 120N000001 HC R&B MED SURG/OB

## 2024-10-24 RX ORDER — AMOXICILLIN 250 MG
2 CAPSULE ORAL 2 TIMES DAILY PRN
Status: DISCONTINUED | OUTPATIENT
Start: 2024-10-24 | End: 2024-10-25

## 2024-10-24 RX ORDER — LIDOCAINE 40 MG/G
CREAM TOPICAL
Status: DISCONTINUED | OUTPATIENT
Start: 2024-10-24 | End: 2024-10-26

## 2024-10-24 RX ORDER — SODIUM CHLORIDE, SODIUM LACTATE, POTASSIUM CHLORIDE, CALCIUM CHLORIDE 600; 310; 30; 20 MG/100ML; MG/100ML; MG/100ML; MG/100ML
INJECTION, SOLUTION INTRAVENOUS CONTINUOUS
Status: DISCONTINUED | OUTPATIENT
Start: 2024-10-24 | End: 2024-10-25

## 2024-10-24 RX ORDER — ONDANSETRON 2 MG/ML
4 INJECTION INTRAMUSCULAR; INTRAVENOUS EVERY 6 HOURS PRN
Status: DISCONTINUED | OUTPATIENT
Start: 2024-10-24 | End: 2024-10-25

## 2024-10-24 RX ORDER — PIPERACILLIN SODIUM, TAZOBACTAM SODIUM 4; .5 G/20ML; G/20ML
4.5 INJECTION, POWDER, LYOPHILIZED, FOR SOLUTION INTRAVENOUS EVERY 6 HOURS
Status: DISCONTINUED | OUTPATIENT
Start: 2024-10-25 | End: 2024-10-26 | Stop reason: HOSPADM

## 2024-10-24 RX ORDER — CALCIUM CARBONATE 500 MG/1
1000 TABLET, CHEWABLE ORAL 4 TIMES DAILY PRN
Status: DISCONTINUED | OUTPATIENT
Start: 2024-10-24 | End: 2024-10-25

## 2024-10-24 RX ORDER — VANCOMYCIN HYDROCHLORIDE 1 G/200ML
1000 INJECTION, SOLUTION INTRAVENOUS EVERY 12 HOURS
Status: DISCONTINUED | OUTPATIENT
Start: 2024-10-25 | End: 2024-10-25

## 2024-10-24 RX ORDER — AMOXICILLIN 250 MG
1 CAPSULE ORAL 2 TIMES DAILY PRN
Status: DISCONTINUED | OUTPATIENT
Start: 2024-10-24 | End: 2024-10-25

## 2024-10-24 RX ORDER — ONDANSETRON 4 MG/1
4 TABLET, ORALLY DISINTEGRATING ORAL EVERY 6 HOURS PRN
Status: DISCONTINUED | OUTPATIENT
Start: 2024-10-24 | End: 2024-10-25

## 2024-10-24 RX ADMIN — SODIUM CHLORIDE, POTASSIUM CHLORIDE, SODIUM LACTATE AND CALCIUM CHLORIDE: 600; 310; 30; 20 INJECTION, SOLUTION INTRAVENOUS at 23:28

## 2024-10-24 ASSESSMENT — ACTIVITIES OF DAILY LIVING (ADL): ADLS_ACUITY_SCORE: 0

## 2024-10-25 ENCOUNTER — APPOINTMENT (OUTPATIENT)
Dept: CT IMAGING | Facility: CLINIC | Age: 69
DRG: 871 | End: 2024-10-25
Payer: MEDICARE

## 2024-10-25 LAB
ALBUMIN SERPL BCG-MCNC: 2.2 G/DL (ref 3.5–5.2)
ALBUMIN SERPL BCG-MCNC: 2.2 G/DL (ref 3.5–5.2)
ALBUMIN SERPL BCG-MCNC: 2.5 G/DL (ref 3.5–5.2)
ALBUMIN UR-MCNC: NEGATIVE MG/DL
ALP SERPL-CCNC: 113 U/L (ref 40–150)
ALP SERPL-CCNC: 113 U/L (ref 40–150)
ALP SERPL-CCNC: 138 U/L (ref 40–150)
ALT SERPL W P-5'-P-CCNC: 34 U/L (ref 0–70)
ALT SERPL W P-5'-P-CCNC: 34 U/L (ref 0–70)
ALT SERPL W P-5'-P-CCNC: 43 U/L (ref 0–70)
ANION GAP SERPL CALCULATED.3IONS-SCNC: 14 MMOL/L (ref 7–15)
ANION GAP SERPL CALCULATED.3IONS-SCNC: 14 MMOL/L (ref 7–15)
ANION GAP SERPL CALCULATED.3IONS-SCNC: 15 MMOL/L (ref 7–15)
APPEARANCE UR: CLEAR
AST SERPL W P-5'-P-CCNC: 33 U/L (ref 0–45)
AST SERPL W P-5'-P-CCNC: 38 U/L (ref 0–45)
AST SERPL W P-5'-P-CCNC: 48 U/L (ref 0–45)
BACTERIA #/AREA URNS HPF: ABNORMAL /HPF
BASE EXCESS BLDV CALC-SCNC: -11.3 MMOL/L (ref -3–3)
BASE EXCESS BLDV CALC-SCNC: -3.1 MMOL/L (ref -3–3)
BASE EXCESS BLDV CALC-SCNC: -5.8 MMOL/L (ref -3–3)
BASE EXCESS BLDV CALC-SCNC: -7.2 MMOL/L (ref -3–3)
BASE EXCESS BLDV CALC-SCNC: -8 MMOL/L (ref -3–3)
BASOPHILS # BLD AUTO: 0 10E3/UL (ref 0–0.2)
BASOPHILS NFR BLD AUTO: 1 %
BILIRUB SERPL-MCNC: 0.5 MG/DL
BILIRUB UR QL STRIP: NEGATIVE
BUN SERPL-MCNC: 23.1 MG/DL (ref 8–23)
BUN SERPL-MCNC: 26.9 MG/DL (ref 8–23)
BUN SERPL-MCNC: 30.1 MG/DL (ref 8–23)
CALCIUM SERPL-MCNC: 7.4 MG/DL (ref 8.8–10.4)
CALCIUM SERPL-MCNC: 7.6 MG/DL (ref 8.8–10.4)
CALCIUM SERPL-MCNC: 7.7 MG/DL (ref 8.8–10.4)
CHLORIDE SERPL-SCNC: 102 MMOL/L (ref 98–107)
CHLORIDE SERPL-SCNC: 103 MMOL/L (ref 98–107)
CHLORIDE SERPL-SCNC: 103 MMOL/L (ref 98–107)
COLOR UR AUTO: YELLOW
CREAT SERPL-MCNC: 0.82 MG/DL (ref 0.67–1.17)
CREAT SERPL-MCNC: 0.87 MG/DL (ref 0.67–1.17)
CREAT SERPL-MCNC: 0.92 MG/DL (ref 0.67–1.17)
CRP SERPL-MCNC: 291.16 MG/L
CRP SERPL-MCNC: 296.29 MG/L
EGFRCR SERPLBLD CKD-EPI 2021: 89 ML/MIN/1.73M2
EGFRCR SERPLBLD CKD-EPI 2021: >90 ML/MIN/1.73M2
EGFRCR SERPLBLD CKD-EPI 2021: >90 ML/MIN/1.73M2
EOSINOPHIL # BLD AUTO: 0 10E3/UL (ref 0–0.7)
EOSINOPHIL NFR BLD AUTO: 1 %
ERYTHROCYTE [DISTWIDTH] IN BLOOD BY AUTOMATED COUNT: 16.1 % (ref 10–15)
ERYTHROCYTE [DISTWIDTH] IN BLOOD BY AUTOMATED COUNT: 16.1 % (ref 10–15)
ERYTHROCYTE [DISTWIDTH] IN BLOOD BY AUTOMATED COUNT: 16.2 % (ref 10–15)
FLUAV RNA SPEC QL NAA+PROBE: NEGATIVE
FLUBV RNA RESP QL NAA+PROBE: NEGATIVE
GLUCOSE BLDC GLUCOMTR-MCNC: 109 MG/DL (ref 70–99)
GLUCOSE BLDC GLUCOMTR-MCNC: 110 MG/DL (ref 70–99)
GLUCOSE BLDC GLUCOMTR-MCNC: 121 MG/DL (ref 70–99)
GLUCOSE BLDC GLUCOMTR-MCNC: 146 MG/DL (ref 70–99)
GLUCOSE BLDC GLUCOMTR-MCNC: 168 MG/DL (ref 70–99)
GLUCOSE BLDC GLUCOMTR-MCNC: 47 MG/DL (ref 70–99)
GLUCOSE BLDC GLUCOMTR-MCNC: 62 MG/DL (ref 70–99)
GLUCOSE BLDC GLUCOMTR-MCNC: 68 MG/DL (ref 70–99)
GLUCOSE BLDC GLUCOMTR-MCNC: 70 MG/DL (ref 70–99)
GLUCOSE BLDC GLUCOMTR-MCNC: 83 MG/DL (ref 70–99)
GLUCOSE SERPL-MCNC: 119 MG/DL (ref 70–99)
GLUCOSE SERPL-MCNC: 68 MG/DL (ref 70–99)
GLUCOSE SERPL-MCNC: 78 MG/DL (ref 70–99)
GLUCOSE UR STRIP-MCNC: NEGATIVE MG/DL
HCO3 BLDV-SCNC: 14 MMOL/L (ref 21–28)
HCO3 BLDV-SCNC: 18 MMOL/L (ref 21–28)
HCO3 BLDV-SCNC: 21 MMOL/L (ref 21–28)
HCO3 SERPL-SCNC: 16 MMOL/L (ref 22–29)
HCO3 SERPL-SCNC: 17 MMOL/L (ref 22–29)
HCO3 SERPL-SCNC: 17 MMOL/L (ref 22–29)
HCT VFR BLD AUTO: 28.7 % (ref 40–53)
HCT VFR BLD AUTO: 29.5 % (ref 40–53)
HCT VFR BLD AUTO: 34 % (ref 40–53)
HGB BLD-MCNC: 10.5 G/DL (ref 13.3–17.7)
HGB BLD-MCNC: 9.2 G/DL (ref 13.3–17.7)
HGB BLD-MCNC: 9.5 G/DL (ref 13.3–17.7)
HGB UR QL STRIP: ABNORMAL
HOLD SPECIMEN: NORMAL
IMM GRANULOCYTES # BLD: 0 10E3/UL
IMM GRANULOCYTES NFR BLD: 1 %
KETONES UR STRIP-MCNC: NEGATIVE MG/DL
L PNEUMO1 AG UR QL IA: NEGATIVE
LACTATE SERPL-SCNC: 2.2 MMOL/L (ref 0.7–2)
LACTATE SERPL-SCNC: 2.8 MMOL/L (ref 0.7–2)
LACTATE SERPL-SCNC: 3.7 MMOL/L (ref 0.7–2)
LACTATE SERPL-SCNC: 4.5 MMOL/L (ref 0.7–2)
LACTATE SERPL-SCNC: 5.2 MMOL/L (ref 0.7–2)
LACTATE SERPL-SCNC: 5.3 MMOL/L (ref 0.7–2)
LACTATE SERPL-SCNC: 7.2 MMOL/L (ref 0.7–2)
LEUKOCYTE ESTERASE UR QL STRIP: ABNORMAL
LIPASE SERPL-CCNC: 26 U/L (ref 13–60)
LYMPHOCYTES # BLD AUTO: 0.7 10E3/UL (ref 0.8–5.3)
LYMPHOCYTES NFR BLD AUTO: 50 %
MAGNESIUM SERPL-MCNC: 1.5 MG/DL (ref 1.7–2.3)
MCH RBC QN AUTO: 31.3 PG (ref 26.5–33)
MCH RBC QN AUTO: 31.9 PG (ref 26.5–33)
MCH RBC QN AUTO: 32.4 PG (ref 26.5–33)
MCHC RBC AUTO-ENTMCNC: 30.9 G/DL (ref 31.5–36.5)
MCHC RBC AUTO-ENTMCNC: 32.1 G/DL (ref 31.5–36.5)
MCHC RBC AUTO-ENTMCNC: 32.2 G/DL (ref 31.5–36.5)
MCV RBC AUTO: 100 FL (ref 78–100)
MCV RBC AUTO: 101 FL (ref 78–100)
MCV RBC AUTO: 101 FL (ref 78–100)
MONOCYTES # BLD AUTO: 0.2 10E3/UL (ref 0–1.3)
MONOCYTES NFR BLD AUTO: 14 %
MRSA DNA SPEC QL NAA+PROBE: NEGATIVE
MUCOUS THREADS #/AREA URNS LPF: PRESENT /LPF
NEUTROPHILS # BLD AUTO: 0.5 10E3/UL (ref 1.6–8.3)
NEUTROPHILS NFR BLD AUTO: 33 %
NITRATE UR QL: NEGATIVE
NRBC # BLD AUTO: 0 10E3/UL
NRBC BLD AUTO-RTO: 0 /100
O2/TOTAL GAS SETTING VFR VENT: 21 %
O2/TOTAL GAS SETTING VFR VENT: 21 %
O2/TOTAL GAS SETTING VFR VENT: 28 %
OXYHGB MFR BLDV: 46 % (ref 70–75)
OXYHGB MFR BLDV: 76 % (ref 70–75)
OXYHGB MFR BLDV: 77 % (ref 70–75)
OXYHGB MFR BLDV: 88 % (ref 70–75)
OXYHGB MFR BLDV: 94 % (ref 70–75)
PCO2 BLDV: 26 MM HG (ref 40–50)
PCO2 BLDV: 29 MM HG (ref 40–50)
PCO2 BLDV: 32 MM HG (ref 40–50)
PCO2 BLDV: 33 MM HG (ref 40–50)
PCO2 BLDV: 37 MM HG (ref 40–50)
PH BLDV: 7.29 [PH] (ref 7.32–7.43)
PH BLDV: 7.32 [PH] (ref 7.32–7.43)
PH BLDV: 7.35 [PH] (ref 7.32–7.43)
PH BLDV: 7.41 [PH] (ref 7.32–7.43)
PH BLDV: 7.41 [PH] (ref 7.32–7.43)
PH UR STRIP: 5 [PH] (ref 5–7)
PHOSPHATE SERPL-MCNC: 2.9 MG/DL (ref 2.5–4.5)
PLAT MORPH BLD: ABNORMAL
PLATELET # BLD AUTO: 110 10E3/UL (ref 150–450)
PLATELET # BLD AUTO: 120 10E3/UL (ref 150–450)
PLATELET # BLD AUTO: 143 10E3/UL (ref 150–450)
PO2 BLDV: 29 MM HG (ref 25–47)
PO2 BLDV: 44 MM HG (ref 25–47)
PO2 BLDV: 47 MM HG (ref 25–47)
PO2 BLDV: 62 MM HG (ref 25–47)
PO2 BLDV: 90 MM HG (ref 25–47)
POTASSIUM SERPL-SCNC: 3.1 MMOL/L (ref 3.4–5.3)
POTASSIUM SERPL-SCNC: 4.4 MMOL/L (ref 3.4–5.3)
POTASSIUM SERPL-SCNC: 4.8 MMOL/L (ref 3.4–5.3)
POTASSIUM SERPL-SCNC: 4.8 MMOL/L (ref 3.4–5.3)
PROCALCITONIN SERPL IA-MCNC: 16.11 NG/ML
PROT SERPL-MCNC: 4.3 G/DL (ref 6.4–8.3)
PROT SERPL-MCNC: 4.7 G/DL (ref 6.4–8.3)
PROT SERPL-MCNC: 5.1 G/DL (ref 6.4–8.3)
RBC # BLD AUTO: 2.88 10E6/UL (ref 4.4–5.9)
RBC # BLD AUTO: 2.93 10E6/UL (ref 4.4–5.9)
RBC # BLD AUTO: 3.36 10E6/UL (ref 4.4–5.9)
RBC MORPH BLD: ABNORMAL
RBC URINE: 2 /HPF
RSV RNA SPEC NAA+PROBE: NEGATIVE
S PNEUM AG SPEC QL: NEGATIVE
SA TARGET DNA: NEGATIVE
SAO2 % BLDV: 46.9 % (ref 70–75)
SAO2 % BLDV: 77.6 % (ref 70–75)
SAO2 % BLDV: 78.6 % (ref 70–75)
SAO2 % BLDV: 90.6 % (ref 70–75)
SAO2 % BLDV: 96.4 % (ref 70–75)
SARS-COV-2 RNA RESP QL NAA+PROBE: NEGATIVE
SODIUM SERPL-SCNC: 132 MMOL/L (ref 135–145)
SODIUM SERPL-SCNC: 134 MMOL/L (ref 135–145)
SODIUM SERPL-SCNC: 135 MMOL/L (ref 135–145)
SP GR UR STRIP: 1.03 (ref 1–1.03)
SPECIMEN TYPE: NORMAL
SQUAMOUS EPITHELIAL: <1 /HPF
UROBILINOGEN UR STRIP-MCNC: NORMAL MG/DL
VARIANT LYMPHS BLD QL SMEAR: PRESENT
WBC # BLD AUTO: 1.4 10E3/UL (ref 4–11)
WBC # BLD AUTO: 2 10E3/UL (ref 4–11)
WBC # BLD AUTO: 2.6 10E3/UL (ref 4–11)
WBC URINE: 12 /HPF

## 2024-10-25 PROCEDURE — 99292 CRITICAL CARE ADDL 30 MIN: CPT | Performed by: FAMILY MEDICINE

## 2024-10-25 PROCEDURE — 84100 ASSAY OF PHOSPHORUS: CPT

## 2024-10-25 PROCEDURE — 74177 CT ABD & PELVIS W/CONTRAST: CPT | Mod: MG

## 2024-10-25 PROCEDURE — 99207 PR APP CREDIT; MD BILLING SHARED VISIT: CPT | Performed by: PHYSICIAN ASSISTANT

## 2024-10-25 PROCEDURE — 82805 BLOOD GASES W/O2 SATURATION: CPT | Performed by: INTERNAL MEDICINE

## 2024-10-25 PROCEDURE — 36415 COLL VENOUS BLD VENIPUNCTURE: CPT

## 2024-10-25 PROCEDURE — 250N000009 HC RX 250: Performed by: FAMILY MEDICINE

## 2024-10-25 PROCEDURE — 82805 BLOOD GASES W/O2 SATURATION: CPT | Performed by: FAMILY MEDICINE

## 2024-10-25 PROCEDURE — 84155 ASSAY OF PROTEIN SERUM: CPT | Performed by: INTERNAL MEDICINE

## 2024-10-25 PROCEDURE — 250N000011 HC RX IP 250 OP 636

## 2024-10-25 PROCEDURE — 250N000011 HC RX IP 250 OP 636: Performed by: FAMILY MEDICINE

## 2024-10-25 PROCEDURE — 83690 ASSAY OF LIPASE: CPT

## 2024-10-25 PROCEDURE — 83605 ASSAY OF LACTIC ACID: CPT | Performed by: INTERNAL MEDICINE

## 2024-10-25 PROCEDURE — 258N000003 HC RX IP 258 OP 636: Performed by: INTERNAL MEDICINE

## 2024-10-25 PROCEDURE — 84155 ASSAY OF PROTEIN SERUM: CPT

## 2024-10-25 PROCEDURE — 81003 URINALYSIS AUTO W/O SCOPE: CPT

## 2024-10-25 PROCEDURE — 87899 AGENT NOS ASSAY W/OPTIC: CPT | Performed by: STUDENT IN AN ORGANIZED HEALTH CARE EDUCATION/TRAINING PROGRAM

## 2024-10-25 PROCEDURE — 999N000157 HC STATISTIC RCP TIME EA 10 MIN

## 2024-10-25 PROCEDURE — 250N000013 HC RX MED GY IP 250 OP 250 PS 637: Performed by: FAMILY MEDICINE

## 2024-10-25 PROCEDURE — 94640 AIRWAY INHALATION TREATMENT: CPT | Mod: 76

## 2024-10-25 PROCEDURE — 84132 ASSAY OF SERUM POTASSIUM: CPT | Performed by: FAMILY MEDICINE

## 2024-10-25 PROCEDURE — 258N000003 HC RX IP 258 OP 636: Performed by: PHYSICIAN ASSISTANT

## 2024-10-25 PROCEDURE — 250N000009 HC RX 250: Performed by: INTERNAL MEDICINE

## 2024-10-25 PROCEDURE — 93005 ELECTROCARDIOGRAM TRACING: CPT

## 2024-10-25 PROCEDURE — 258N000001 HC RX 258

## 2024-10-25 PROCEDURE — G0427 INPT/ED TELECONSULT70: HCPCS | Mod: 95 | Performed by: STUDENT IN AN ORGANIZED HEALTH CARE EDUCATION/TRAINING PROGRAM

## 2024-10-25 PROCEDURE — 85025 COMPLETE CBC W/AUTO DIFF WBC: CPT

## 2024-10-25 PROCEDURE — G0463 HOSPITAL OUTPT CLINIC VISIT: HCPCS

## 2024-10-25 PROCEDURE — 83735 ASSAY OF MAGNESIUM: CPT

## 2024-10-25 PROCEDURE — 250N000011 HC RX IP 250 OP 636: Performed by: PHYSICIAN ASSISTANT

## 2024-10-25 PROCEDURE — 87040 BLOOD CULTURE FOR BACTERIA: CPT

## 2024-10-25 PROCEDURE — 258N000003 HC RX IP 258 OP 636: Performed by: FAMILY MEDICINE

## 2024-10-25 PROCEDURE — 250N000012 HC RX MED GY IP 250 OP 636 PS 637: Performed by: FAMILY MEDICINE

## 2024-10-25 PROCEDURE — 84145 PROCALCITONIN (PCT): CPT

## 2024-10-25 PROCEDURE — 87385 HISTOPLASMA CAPSUL AG IA: CPT | Performed by: INTERNAL MEDICINE

## 2024-10-25 PROCEDURE — 250N000009 HC RX 250

## 2024-10-25 PROCEDURE — 36415 COLL VENOUS BLD VENIPUNCTURE: CPT | Performed by: FAMILY MEDICINE

## 2024-10-25 PROCEDURE — 87637 SARSCOV2&INF A&B&RSV AMP PRB: CPT

## 2024-10-25 PROCEDURE — 272N000580 HC KIT, 4 OR 5FR DUAL LUMEN POWER PICC

## 2024-10-25 PROCEDURE — 99291 CRITICAL CARE FIRST HOUR: CPT | Performed by: FAMILY MEDICINE

## 2024-10-25 PROCEDURE — 83605 ASSAY OF LACTIC ACID: CPT

## 2024-10-25 PROCEDURE — 85014 HEMATOCRIT: CPT

## 2024-10-25 PROCEDURE — 87086 URINE CULTURE/COLONY COUNT: CPT | Performed by: FAMILY MEDICINE

## 2024-10-25 PROCEDURE — 99291 CRITICAL CARE FIRST HOUR: CPT | Mod: 24 | Performed by: INTERNAL MEDICINE

## 2024-10-25 PROCEDURE — 86140 C-REACTIVE PROTEIN: CPT

## 2024-10-25 PROCEDURE — 200N000001 HC R&B ICU

## 2024-10-25 PROCEDURE — 36415 COLL VENOUS BLD VENIPUNCTURE: CPT | Performed by: INTERNAL MEDICINE

## 2024-10-25 PROCEDURE — 36569 INSJ PICC 5 YR+ W/O IMAGING: CPT | Mod: 52

## 2024-10-25 PROCEDURE — 94640 AIRWAY INHALATION TREATMENT: CPT

## 2024-10-25 PROCEDURE — 250N000012 HC RX MED GY IP 250 OP 636 PS 637

## 2024-10-25 PROCEDURE — G1010 CDSM STANSON: HCPCS

## 2024-10-25 PROCEDURE — 250N000013 HC RX MED GY IP 250 OP 250 PS 637

## 2024-10-25 PROCEDURE — 83605 ASSAY OF LACTIC ACID: CPT | Performed by: FAMILY MEDICINE

## 2024-10-25 PROCEDURE — 85014 HEMATOCRIT: CPT | Performed by: INTERNAL MEDICINE

## 2024-10-25 PROCEDURE — 82805 BLOOD GASES W/O2 SATURATION: CPT

## 2024-10-25 PROCEDURE — 258N000003 HC RX IP 258 OP 636

## 2024-10-25 PROCEDURE — 86635 COCCIDIOIDES ANTIBODY: CPT | Performed by: INTERNAL MEDICINE

## 2024-10-25 PROCEDURE — 87305 ASPERGILLUS AG IA: CPT | Performed by: INTERNAL MEDICINE

## 2024-10-25 PROCEDURE — 120N000013 HC R&B IMCU

## 2024-10-25 PROCEDURE — 87641 MR-STAPH DNA AMP PROBE: CPT

## 2024-10-25 PROCEDURE — 87449 NOS EACH ORGANISM AG IA: CPT | Performed by: INTERNAL MEDICINE

## 2024-10-25 RX ORDER — ACETAMINOPHEN 650 MG/1
650 SUPPOSITORY RECTAL EVERY 4 HOURS PRN
Status: CANCELLED | OUTPATIENT
Start: 2024-10-25

## 2024-10-25 RX ORDER — LIDOCAINE 40 MG/G
CREAM TOPICAL
Status: DISCONTINUED | OUTPATIENT
Start: 2024-10-25 | End: 2024-10-26

## 2024-10-25 RX ORDER — NALOXONE HYDROCHLORIDE 0.4 MG/ML
0.2 INJECTION, SOLUTION INTRAMUSCULAR; INTRAVENOUS; SUBCUTANEOUS
Status: DISCONTINUED | OUTPATIENT
Start: 2024-10-25 | End: 2024-10-26 | Stop reason: HOSPADM

## 2024-10-25 RX ORDER — SODIUM CHLORIDE, SODIUM LACTATE, POTASSIUM CHLORIDE, CALCIUM CHLORIDE 600; 310; 30; 20 MG/100ML; MG/100ML; MG/100ML; MG/100ML
INJECTION, SOLUTION INTRAVENOUS CONTINUOUS
Status: DISCONTINUED | OUTPATIENT
Start: 2024-10-25 | End: 2024-10-25

## 2024-10-25 RX ORDER — ACETAMINOPHEN 650 MG/1
650 SUPPOSITORY RECTAL EVERY 4 HOURS PRN
Status: DISCONTINUED | OUTPATIENT
Start: 2024-10-25 | End: 2024-10-26 | Stop reason: HOSPADM

## 2024-10-25 RX ORDER — ACYCLOVIR 200 MG/1
400 CAPSULE ORAL 2 TIMES DAILY
Status: CANCELLED | OUTPATIENT
Start: 2024-10-25

## 2024-10-25 RX ORDER — IPRATROPIUM BROMIDE AND ALBUTEROL SULFATE 2.5; .5 MG/3ML; MG/3ML
3 SOLUTION RESPIRATORY (INHALATION)
Status: CANCELLED | OUTPATIENT
Start: 2024-10-25

## 2024-10-25 RX ORDER — AMOXICILLIN 250 MG
1 CAPSULE ORAL 2 TIMES DAILY PRN
Status: CANCELLED | OUTPATIENT
Start: 2024-10-25

## 2024-10-25 RX ORDER — ROSUVASTATIN CALCIUM 20 MG/1
20 TABLET, COATED ORAL
Status: DISCONTINUED | OUTPATIENT
Start: 2024-10-25 | End: 2024-10-26 | Stop reason: HOSPADM

## 2024-10-25 RX ORDER — FLUDROCORTISONE ACETATE 0.1 MG/1
0.1 TABLET ORAL DAILY
Status: DISCONTINUED | OUTPATIENT
Start: 2024-10-25 | End: 2024-10-26 | Stop reason: HOSPADM

## 2024-10-25 RX ORDER — NICOTINE POLACRILEX 4 MG
15-30 LOZENGE BUCCAL
Status: DISCONTINUED | OUTPATIENT
Start: 2024-10-25 | End: 2024-10-26 | Stop reason: HOSPADM

## 2024-10-25 RX ORDER — FUROSEMIDE 20 MG/1
20 TABLET ORAL DAILY
Status: CANCELLED | OUTPATIENT
Start: 2024-10-26

## 2024-10-25 RX ORDER — PREDNISONE 5 MG/1
0.5 TABLET ORAL EVERY EVENING
COMMUNITY

## 2024-10-25 RX ORDER — DAPSONE 25 MG/1
50 TABLET ORAL DAILY
Status: DISCONTINUED | OUTPATIENT
Start: 2024-10-25 | End: 2024-10-26 | Stop reason: HOSPADM

## 2024-10-25 RX ORDER — IPRATROPIUM BROMIDE AND ALBUTEROL SULFATE 2.5; .5 MG/3ML; MG/3ML
3 SOLUTION RESPIRATORY (INHALATION)
Status: DISCONTINUED | OUTPATIENT
Start: 2024-10-25 | End: 2024-10-26 | Stop reason: HOSPADM

## 2024-10-25 RX ORDER — ACETAMINOPHEN 325 MG/1
650 TABLET ORAL EVERY 4 HOURS PRN
Status: DISCONTINUED | OUTPATIENT
Start: 2024-10-25 | End: 2024-10-26 | Stop reason: HOSPADM

## 2024-10-25 RX ORDER — POTASSIUM CHLORIDE 20MEQ/15ML
40 LIQUID (ML) ORAL ONCE
Status: COMPLETED | OUTPATIENT
Start: 2024-10-25 | End: 2024-10-25

## 2024-10-25 RX ORDER — IOPAMIDOL 755 MG/ML
63 INJECTION, SOLUTION INTRAVASCULAR ONCE
Status: DISCONTINUED | OUTPATIENT
Start: 2024-10-25 | End: 2024-10-25

## 2024-10-25 RX ORDER — AMOXICILLIN 250 MG
1 CAPSULE ORAL 2 TIMES DAILY PRN
Status: DISCONTINUED | OUTPATIENT
Start: 2024-10-25 | End: 2024-10-25

## 2024-10-25 RX ORDER — ONDANSETRON 2 MG/ML
4 INJECTION INTRAMUSCULAR; INTRAVENOUS EVERY 6 HOURS PRN
Status: DISCONTINUED | OUTPATIENT
Start: 2024-10-25 | End: 2024-10-26 | Stop reason: HOSPADM

## 2024-10-25 RX ORDER — DOXYCYCLINE 100 MG/10ML
100 INJECTION, POWDER, LYOPHILIZED, FOR SOLUTION INTRAVENOUS EVERY 12 HOURS
Status: DISCONTINUED | OUTPATIENT
Start: 2024-10-25 | End: 2024-10-26 | Stop reason: HOSPADM

## 2024-10-25 RX ORDER — AMOXICILLIN 250 MG
2 CAPSULE ORAL 2 TIMES DAILY PRN
Status: DISCONTINUED | OUTPATIENT
Start: 2024-10-25 | End: 2024-10-26 | Stop reason: HOSPADM

## 2024-10-25 RX ORDER — CARVEDILOL 6.25 MG/1
6.25 TABLET ORAL 2 TIMES DAILY WITH MEALS
Status: CANCELLED | OUTPATIENT
Start: 2024-10-26

## 2024-10-25 RX ORDER — HYDROCORTISONE SODIUM SUCCINATE 100 MG/2ML
50 INJECTION INTRAMUSCULAR; INTRAVENOUS EVERY 6 HOURS
Status: DISCONTINUED | OUTPATIENT
Start: 2024-10-25 | End: 2024-10-26 | Stop reason: HOSPADM

## 2024-10-25 RX ORDER — HYDROCORTISONE SODIUM SUCCINATE 100 MG/2ML
100 INJECTION INTRAMUSCULAR; INTRAVENOUS ONCE
Status: COMPLETED | OUTPATIENT
Start: 2024-10-25 | End: 2024-10-25

## 2024-10-25 RX ORDER — VANCOMYCIN HYDROCHLORIDE 1 G/200ML
1000 INJECTION, SOLUTION INTRAVENOUS
Status: DISCONTINUED | OUTPATIENT
Start: 2024-10-25 | End: 2024-10-26 | Stop reason: HOSPADM

## 2024-10-25 RX ORDER — NOREPINEPHRINE BITARTRATE 0.02 MG/ML
.01-.6 INJECTION, SOLUTION INTRAVENOUS CONTINUOUS
Status: DISCONTINUED | OUTPATIENT
Start: 2024-10-25 | End: 2024-10-26

## 2024-10-25 RX ORDER — ONDANSETRON 2 MG/ML
4 INJECTION INTRAMUSCULAR; INTRAVENOUS EVERY 6 HOURS PRN
Status: DISCONTINUED | OUTPATIENT
Start: 2024-10-25 | End: 2024-10-25

## 2024-10-25 RX ORDER — TACROLIMUS 1 MG/1
3 CAPSULE ORAL 2 TIMES DAILY
Status: DISCONTINUED | OUTPATIENT
Start: 2024-10-25 | End: 2024-10-26 | Stop reason: HOSPADM

## 2024-10-25 RX ORDER — PREDNISONE 5 MG/1
1 TABLET ORAL EVERY MORNING
COMMUNITY

## 2024-10-25 RX ORDER — HYDROCORTISONE SODIUM SUCCINATE 100 MG/2ML
50 INJECTION INTRAMUSCULAR; INTRAVENOUS EVERY 6 HOURS
Status: CANCELLED | OUTPATIENT
Start: 2024-10-25

## 2024-10-25 RX ORDER — AMOXICILLIN 250 MG
1 CAPSULE ORAL 2 TIMES DAILY PRN
Status: DISCONTINUED | OUTPATIENT
Start: 2024-10-25 | End: 2024-10-26 | Stop reason: HOSPADM

## 2024-10-25 RX ORDER — DEXTROSE MONOHYDRATE 25 G/50ML
25-50 INJECTION, SOLUTION INTRAVENOUS
Status: DISCONTINUED | OUTPATIENT
Start: 2024-10-25 | End: 2024-10-26 | Stop reason: HOSPADM

## 2024-10-25 RX ORDER — PREDNISONE 2.5 MG/1
2.5 TABLET ORAL EVERY EVENING
Status: DISCONTINUED | OUTPATIENT
Start: 2024-10-25 | End: 2024-10-26 | Stop reason: HOSPADM

## 2024-10-25 RX ORDER — NALOXONE HYDROCHLORIDE 0.4 MG/ML
0.4 INJECTION, SOLUTION INTRAMUSCULAR; INTRAVENOUS; SUBCUTANEOUS
Status: DISCONTINUED | OUTPATIENT
Start: 2024-10-25 | End: 2024-10-26 | Stop reason: HOSPADM

## 2024-10-25 RX ORDER — PANTOPRAZOLE SODIUM 40 MG/1
40 TABLET, DELAYED RELEASE ORAL DAILY
Status: CANCELLED | OUTPATIENT
Start: 2024-10-26

## 2024-10-25 RX ORDER — MONTELUKAST SODIUM 10 MG/1
10 TABLET ORAL EVERY EVENING
Status: DISCONTINUED | OUTPATIENT
Start: 2024-10-25 | End: 2024-10-26 | Stop reason: HOSPADM

## 2024-10-25 RX ORDER — AMOXICILLIN 250 MG
2 CAPSULE ORAL 2 TIMES DAILY PRN
Status: CANCELLED | OUTPATIENT
Start: 2024-10-25

## 2024-10-25 RX ORDER — NALOXONE HYDROCHLORIDE 0.4 MG/ML
0.2 INJECTION, SOLUTION INTRAMUSCULAR; INTRAVENOUS; SUBCUTANEOUS
Status: CANCELLED | OUTPATIENT
Start: 2024-10-25

## 2024-10-25 RX ORDER — ACETAMINOPHEN 325 MG/1
650 TABLET ORAL EVERY 4 HOURS PRN
Status: CANCELLED | OUTPATIENT
Start: 2024-10-25

## 2024-10-25 RX ORDER — FLUDROCORTISONE ACETATE 0.1 MG/1
0.1 TABLET ORAL DAILY
Status: CANCELLED | OUTPATIENT
Start: 2024-10-26

## 2024-10-25 RX ORDER — PREDNISONE 5 MG/1
5 TABLET ORAL EVERY MORNING
Status: DISCONTINUED | OUTPATIENT
Start: 2024-10-25 | End: 2024-10-26 | Stop reason: HOSPADM

## 2024-10-25 RX ORDER — PIPERACILLIN SODIUM, TAZOBACTAM SODIUM 4; .5 G/20ML; G/20ML
4.5 INJECTION, POWDER, LYOPHILIZED, FOR SOLUTION INTRAVENOUS EVERY 6 HOURS
Status: CANCELLED | OUTPATIENT
Start: 2024-10-25

## 2024-10-25 RX ORDER — PREDNISONE 2.5 MG/1
2.5 TABLET ORAL EVERY EVENING
Status: CANCELLED | OUTPATIENT
Start: 2024-10-26

## 2024-10-25 RX ORDER — MONTELUKAST SODIUM 10 MG/1
10 TABLET ORAL EVERY EVENING
Status: CANCELLED | OUTPATIENT
Start: 2024-10-26

## 2024-10-25 RX ORDER — DAPSONE 25 MG/1
50 TABLET ORAL DAILY
Status: CANCELLED | OUTPATIENT
Start: 2024-10-26

## 2024-10-25 RX ORDER — NALOXONE HYDROCHLORIDE 0.4 MG/ML
0.4 INJECTION, SOLUTION INTRAMUSCULAR; INTRAVENOUS; SUBCUTANEOUS
Status: CANCELLED | OUTPATIENT
Start: 2024-10-25

## 2024-10-25 RX ORDER — LIDOCAINE 40 MG/G
CREAM TOPICAL
Status: CANCELLED | OUTPATIENT
Start: 2024-10-25

## 2024-10-25 RX ORDER — IOPAMIDOL 755 MG/ML
63 INJECTION, SOLUTION INTRAVASCULAR ONCE
Status: COMPLETED | OUTPATIENT
Start: 2024-10-25 | End: 2024-10-25

## 2024-10-25 RX ORDER — CALCIUM CARBONATE 500 MG/1
1000 TABLET, CHEWABLE ORAL 4 TIMES DAILY PRN
Status: DISCONTINUED | OUTPATIENT
Start: 2024-10-25 | End: 2024-10-26 | Stop reason: HOSPADM

## 2024-10-25 RX ORDER — LIDOCAINE 40 MG/G
CREAM TOPICAL
Status: CANCELLED | OUTPATIENT
Start: 2024-10-25 | End: 2024-10-28

## 2024-10-25 RX ORDER — TACROLIMUS 1 MG/1
3 CAPSULE ORAL 2 TIMES DAILY
Status: CANCELLED | OUTPATIENT
Start: 2024-10-25

## 2024-10-25 RX ORDER — VENLAFAXINE HYDROCHLORIDE 37.5 MG/1
37.5 CAPSULE, EXTENDED RELEASE ORAL DAILY
Status: CANCELLED | OUTPATIENT
Start: 2024-10-26

## 2024-10-25 RX ORDER — ASPIRIN 81 MG/1
81 TABLET ORAL DAILY
Status: DISCONTINUED | OUTPATIENT
Start: 2024-10-25 | End: 2024-10-26 | Stop reason: HOSPADM

## 2024-10-25 RX ORDER — ONDANSETRON 4 MG/1
4 TABLET, ORALLY DISINTEGRATING ORAL EVERY 6 HOURS PRN
Status: CANCELLED | OUTPATIENT
Start: 2024-10-25

## 2024-10-25 RX ORDER — NICOTINE POLACRILEX 4 MG
15-30 LOZENGE BUCCAL
Status: CANCELLED | OUTPATIENT
Start: 2024-10-25

## 2024-10-25 RX ORDER — ROSUVASTATIN CALCIUM 20 MG/1
20 TABLET, COATED ORAL
Status: CANCELLED | OUTPATIENT
Start: 2024-10-28

## 2024-10-25 RX ORDER — PREDNISONE 5 MG/1
5 TABLET ORAL EVERY MORNING
Status: CANCELLED | OUTPATIENT
Start: 2024-10-26

## 2024-10-25 RX ORDER — AMOXICILLIN 250 MG
2 CAPSULE ORAL 2 TIMES DAILY PRN
Status: DISCONTINUED | OUTPATIENT
Start: 2024-10-25 | End: 2024-10-25

## 2024-10-25 RX ORDER — CARVEDILOL 6.25 MG/1
6.25 TABLET ORAL 2 TIMES DAILY WITH MEALS
Status: DISCONTINUED | OUTPATIENT
Start: 2024-10-25 | End: 2024-10-26 | Stop reason: HOSPADM

## 2024-10-25 RX ORDER — ASPIRIN 81 MG/1
81 TABLET ORAL DAILY
Status: CANCELLED | OUTPATIENT
Start: 2024-10-26

## 2024-10-25 RX ORDER — VENLAFAXINE HYDROCHLORIDE 37.5 MG/1
37.5 CAPSULE, EXTENDED RELEASE ORAL DAILY
Status: DISCONTINUED | OUTPATIENT
Start: 2024-10-25 | End: 2024-10-26 | Stop reason: HOSPADM

## 2024-10-25 RX ORDER — ACYCLOVIR 200 MG/1
400 CAPSULE ORAL 2 TIMES DAILY
Status: DISCONTINUED | OUTPATIENT
Start: 2024-10-25 | End: 2024-10-26 | Stop reason: HOSPADM

## 2024-10-25 RX ORDER — NICOTINE POLACRILEX 4 MG
15-30 LOZENGE BUCCAL
Status: DISCONTINUED | OUTPATIENT
Start: 2024-10-25 | End: 2024-10-26

## 2024-10-25 RX ORDER — ONDANSETRON 4 MG/1
4 TABLET, ORALLY DISINTEGRATING ORAL EVERY 6 HOURS PRN
Status: DISCONTINUED | OUTPATIENT
Start: 2024-10-25 | End: 2024-10-26 | Stop reason: HOSPADM

## 2024-10-25 RX ORDER — AZITHROMYCIN 250 MG/1
250 TABLET, FILM COATED ORAL
Status: DISCONTINUED | OUTPATIENT
Start: 2024-10-25 | End: 2024-10-26 | Stop reason: HOSPADM

## 2024-10-25 RX ORDER — AZITHROMYCIN 250 MG/1
250 TABLET, FILM COATED ORAL
Status: CANCELLED | OUTPATIENT
Start: 2024-10-28

## 2024-10-25 RX ORDER — FUROSEMIDE 20 MG/1
20 TABLET ORAL DAILY
Status: DISCONTINUED | OUTPATIENT
Start: 2024-10-25 | End: 2024-10-26 | Stop reason: HOSPADM

## 2024-10-25 RX ORDER — PANTOPRAZOLE SODIUM 40 MG/1
40 TABLET, DELAYED RELEASE ORAL DAILY
Status: DISCONTINUED | OUTPATIENT
Start: 2024-10-25 | End: 2024-10-26 | Stop reason: HOSPADM

## 2024-10-25 RX ORDER — CALCIUM CARBONATE 500 MG/1
1000 TABLET, CHEWABLE ORAL 4 TIMES DAILY PRN
Status: CANCELLED | OUTPATIENT
Start: 2024-10-25

## 2024-10-25 RX ORDER — VANCOMYCIN HYDROCHLORIDE 1 G/200ML
1000 INJECTION, SOLUTION INTRAVENOUS
Status: CANCELLED | OUTPATIENT
Start: 2024-10-26

## 2024-10-25 RX ORDER — LIDOCAINE 40 MG/G
CREAM TOPICAL
Status: DISCONTINUED | OUTPATIENT
Start: 2024-10-25 | End: 2024-10-26 | Stop reason: HOSPADM

## 2024-10-25 RX ORDER — DOXYCYCLINE 100 MG/10ML
100 INJECTION, POWDER, LYOPHILIZED, FOR SOLUTION INTRAVENOUS EVERY 12 HOURS
Status: CANCELLED | OUTPATIENT
Start: 2024-10-26

## 2024-10-25 RX ORDER — DEXTROSE MONOHYDRATE 25 G/50ML
25-50 INJECTION, SOLUTION INTRAVENOUS
Status: CANCELLED | OUTPATIENT
Start: 2024-10-25

## 2024-10-25 RX ORDER — ONDANSETRON 4 MG/1
4 TABLET, ORALLY DISINTEGRATING ORAL EVERY 6 HOURS PRN
Status: DISCONTINUED | OUTPATIENT
Start: 2024-10-25 | End: 2024-10-25

## 2024-10-25 RX ORDER — ONDANSETRON 2 MG/ML
4 INJECTION INTRAMUSCULAR; INTRAVENOUS EVERY 6 HOURS PRN
Status: CANCELLED | OUTPATIENT
Start: 2024-10-25

## 2024-10-25 RX ORDER — DEXTROSE MONOHYDRATE 25 G/50ML
25-50 INJECTION, SOLUTION INTRAVENOUS
Status: DISCONTINUED | OUTPATIENT
Start: 2024-10-25 | End: 2024-10-26

## 2024-10-25 RX ADMIN — DAPSONE 50 MG: 25 TABLET ORAL at 10:04

## 2024-10-25 RX ADMIN — DEXTROSE MONOHYDRATE 25 ML: 25 INJECTION, SOLUTION INTRAVENOUS at 08:05

## 2024-10-25 RX ADMIN — ROSUVASTATIN CALCIUM 20 MG: 20 TABLET, FILM COATED ORAL at 11:23

## 2024-10-25 RX ADMIN — VENLAFAXINE HYDROCHLORIDE 37.5 MG: 37.5 CAPSULE, EXTENDED RELEASE ORAL at 10:06

## 2024-10-25 RX ADMIN — HYDROCORTISONE SODIUM SUCCINATE 50 MG: 100 INJECTION, POWDER, FOR SOLUTION INTRAMUSCULAR; INTRAVENOUS at 20:07

## 2024-10-25 RX ADMIN — CARVEDILOL 6.25 MG: 6.25 TABLET, FILM COATED ORAL at 02:59

## 2024-10-25 RX ADMIN — PIPERACILLIN AND TAZOBACTAM 4.5 G: 4; .5 INJECTION, POWDER, FOR SOLUTION INTRAVENOUS at 11:24

## 2024-10-25 RX ADMIN — HYDROCORTISONE SODIUM SUCCINATE 50 MG: 100 INJECTION, POWDER, FOR SOLUTION INTRAMUSCULAR; INTRAVENOUS at 12:55

## 2024-10-25 RX ADMIN — ASPIRIN 81 MG: 81 TABLET, COATED ORAL at 10:03

## 2024-10-25 RX ADMIN — PIPERACILLIN AND TAZOBACTAM 4.5 G: 4; .5 INJECTION, POWDER, FOR SOLUTION INTRAVENOUS at 06:01

## 2024-10-25 RX ADMIN — SODIUM CHLORIDE 56 ML: 9 INJECTION, SOLUTION INTRAVENOUS at 02:12

## 2024-10-25 RX ADMIN — HYDROCORTISONE SODIUM SUCCINATE 100 MG: 100 INJECTION, POWDER, FOR SOLUTION INTRAMUSCULAR; INTRAVENOUS at 08:23

## 2024-10-25 RX ADMIN — POTASSIUM CHLORIDE 40 MEQ: 20 SOLUTION ORAL at 11:13

## 2024-10-25 RX ADMIN — SODIUM CHLORIDE, POTASSIUM CHLORIDE, SODIUM LACTATE AND CALCIUM CHLORIDE 1000 ML: 600; 310; 30; 20 INJECTION, SOLUTION INTRAVENOUS at 14:06

## 2024-10-25 RX ADMIN — ACYCLOVIR 400 MG: 200 CAPSULE ORAL at 20:09

## 2024-10-25 RX ADMIN — IOPAMIDOL 63 ML: 755 INJECTION, SOLUTION INTRAVENOUS at 02:11

## 2024-10-25 RX ADMIN — LIDOCAINE HYDROCHLORIDE ANHYDROUS 1 ML: 10 INJECTION, SOLUTION INFILTRATION at 11:20

## 2024-10-25 RX ADMIN — TACROLIMUS 3 MG: 1 CAPSULE ORAL at 10:06

## 2024-10-25 RX ADMIN — SODIUM BICARBONATE: 84 INJECTION, SOLUTION INTRAVENOUS at 18:33

## 2024-10-25 RX ADMIN — DOXYCYCLINE 100 MG: 100 INJECTION, POWDER, LYOPHILIZED, FOR SOLUTION INTRAVENOUS at 12:58

## 2024-10-25 RX ADMIN — DEXTROSE MONOHYDRATE 25 ML: 25 INJECTION, SOLUTION INTRAVENOUS at 08:56

## 2024-10-25 RX ADMIN — PIPERACILLIN AND TAZOBACTAM 4.5 G: 4; .5 INJECTION, POWDER, FOR SOLUTION INTRAVENOUS at 00:55

## 2024-10-25 RX ADMIN — IPRATROPIUM BROMIDE AND ALBUTEROL SULFATE 3 ML: 2.5; .5 SOLUTION RESPIRATORY (INHALATION) at 16:22

## 2024-10-25 RX ADMIN — PREDNISONE 2.5 MG: 2.5 TABLET ORAL at 17:03

## 2024-10-25 RX ADMIN — SODIUM CHLORIDE, POTASSIUM CHLORIDE, SODIUM LACTATE AND CALCIUM CHLORIDE 1752 ML: 600; 310; 30; 20 INJECTION, SOLUTION INTRAVENOUS at 06:43

## 2024-10-25 RX ADMIN — TACROLIMUS 3 MG: 1 CAPSULE ORAL at 03:00

## 2024-10-25 RX ADMIN — APIXABAN 5 MG: 5 TABLET, FILM COATED ORAL at 20:09

## 2024-10-25 RX ADMIN — FLUDROCORTISONE ACETATE 0.1 MG: 0.1 TABLET ORAL at 10:04

## 2024-10-25 RX ADMIN — VANCOMYCIN HYDROCHLORIDE 1000 MG: 1 INJECTION, SOLUTION INTRAVENOUS at 17:20

## 2024-10-25 RX ADMIN — IPRATROPIUM BROMIDE AND ALBUTEROL SULFATE 3 ML: 2.5; .5 SOLUTION RESPIRATORY (INHALATION) at 13:41

## 2024-10-25 RX ADMIN — APIXABAN 5 MG: 5 TABLET, FILM COATED ORAL at 11:36

## 2024-10-25 RX ADMIN — IPRATROPIUM BROMIDE AND ALBUTEROL SULFATE 3 ML: 2.5; .5 SOLUTION RESPIRATORY (INHALATION) at 07:50

## 2024-10-25 RX ADMIN — ACETAMINOPHEN 650 MG: 325 TABLET ORAL at 02:41

## 2024-10-25 RX ADMIN — PANTOPRAZOLE SODIUM 40 MG: 40 TABLET, DELAYED RELEASE ORAL at 10:05

## 2024-10-25 RX ADMIN — SODIUM CHLORIDE, POTASSIUM CHLORIDE, SODIUM LACTATE AND CALCIUM CHLORIDE 1000 ML: 600; 310; 30; 20 INJECTION, SOLUTION INTRAVENOUS at 10:02

## 2024-10-25 RX ADMIN — PREDNISONE 5 MG: 5 TABLET ORAL at 10:04

## 2024-10-25 RX ADMIN — ACYCLOVIR 400 MG: 200 CAPSULE ORAL at 02:59

## 2024-10-25 RX ADMIN — MONTELUKAST 10 MG: 10 TABLET, FILM COATED ORAL at 02:59

## 2024-10-25 RX ADMIN — SODIUM CHLORIDE 1500 MG: 9 INJECTION, SOLUTION INTRAVENOUS at 02:53

## 2024-10-25 RX ADMIN — TACROLIMUS 3 MG: 1 CAPSULE ORAL at 20:09

## 2024-10-25 RX ADMIN — LIDOCAINE HYDROCHLORIDE ANHYDROUS 1 ML: 10 INJECTION, SOLUTION INFILTRATION at 11:45

## 2024-10-25 RX ADMIN — ACYCLOVIR 400 MG: 200 CAPSULE ORAL at 11:36

## 2024-10-25 RX ADMIN — SODIUM CHLORIDE, POTASSIUM CHLORIDE, SODIUM LACTATE AND CALCIUM CHLORIDE: 600; 310; 30; 20 INJECTION, SOLUTION INTRAVENOUS at 11:04

## 2024-10-25 RX ADMIN — PIPERACILLIN AND TAZOBACTAM 4.5 G: 4; .5 INJECTION, POWDER, FOR SOLUTION INTRAVENOUS at 18:36

## 2024-10-25 RX ADMIN — ACETAMINOPHEN 650 MG: 325 TABLET ORAL at 11:16

## 2024-10-25 RX ADMIN — MONTELUKAST 10 MG: 10 TABLET, FILM COATED ORAL at 17:03

## 2024-10-25 RX ADMIN — APIXABAN 5 MG: 5 TABLET, FILM COATED ORAL at 02:59

## 2024-10-25 RX ADMIN — SODIUM CHLORIDE, POTASSIUM CHLORIDE, SODIUM LACTATE AND CALCIUM CHLORIDE 1000 ML: 600; 310; 30; 20 INJECTION, SOLUTION INTRAVENOUS at 02:34

## 2024-10-25 ASSESSMENT — ACTIVITIES OF DAILY LIVING (ADL)
ADLS_ACUITY_SCORE: 0
DEPENDENT_IADLS:: CLEANING;COOKING;LAUNDRY;SHOPPING;MEAL PREPARATION;MEDICATION MANAGEMENT;MONEY MANAGEMENT;TRANSPORTATION
ADLS_ACUITY_SCORE: 0

## 2024-10-25 ASSESSMENT — ENCOUNTER SYMPTOMS
VOMITING: 0
CHILLS: 1
FEVER: 1
ABDOMINAL PAIN: 1
NAUSEA: 1
SHORTNESS OF BREATH: 0
COUGH: 0

## 2024-10-25 NOTE — PROGRESS NOTES
[facility] Hospitalist Progress Note           Assessment & Plan        Aubrey Duncan is a 69 year old male who presents on 10/24/2024 with lethargy. On evaluation he is septic with fever, tachycardia, and elevated lactic acid. CT chest/abdomen/pelvis showing possible pneumonia and urinalysis UTI. Recent left BKA with wound dehiscence and minimal purulent drainage, no obvious cellulitis. Unclear source of infection, however started on vancomycin and Zosyn with fluid bolus followed by maintenance.     Sepsis with septic shock - most likely due to pneumonia vs UTI    Doubt cellulitis of left stump  Encephalopathy due to sepsis   Complicated by immune suppression due to lung transplant as below    - Arrived to outside hospital febrile 102.8 (normal prior to transfer). Tachycardic 109 with max 122. EGK sinus rhythm. Leukopenic (2.0). Lactate 3.2 -> 3.1 -> 2.2. CRP elevated 296.29. Procalcitonin 16.11. UA with pyuria and bacteriuria. Started on ceftriaxone.  CT chest/abdomen/pelvis showing interval development of new hazy ill-defined pulmonary nodules involving the right upper lobe and right middle lobe having the appearance of an acute infectious/inflammatory etiology. Interval decrease in prior consolidation posterior right lower lobe with minimal residual atelectasis remaining right lower lobe. Minimal atelectasis left lower lobe with chronic left basilar pleural fluid remaining stable. Gallbladder distension, no cholelithiasis, with mild extrahepatic biliary prominence without choledocholithiasis. LFTs and bilirubin WNL. Diffuse mild pain to palpation on abdominal exam on admission, but no pain/tenderness AM 10/25. Lungs with LLL crackles, remainder of lung fields clear. Left stump with wound dehiscence and minimal drainage but otherwise looked good AM 10/25. No significant erythema or warmth.  - outside UA showed 10-20 WBCs and trace leukocyte esterase - no apparent urine culture sent   - admitted on Vancomycin  (pharmacy to dose) and piperacillin-tazobactam 4.5 g q6hrs  - LR 1 liter bolus followed by 125 ml/heart rate on admission   - Acetaminophen available for fever  - became hypotensive AM 10/25 as below.  Persisted despite initial 30cc/kg bolus.  Gave additional 1L bolus, see discussion of PICC/pressors below   - continued maintenance LR @ 100/hour - reassess 10/25  - transplant recommended adding minocycline given history of stenotrophomonas that grew from his wound culture from the right knee 8/25/24 - this however is non-formulary and would have to be sent up from the U - discussed with ID who agreed with pharmacy that substituting doxycycline would be reasonable - started on IV doxycycline 10/25.     -  ? 291   - procalcitonin 16   - lactic 2.2 ? 2.8 ? 3.7 - recheck in afternoon after IVF as above and in AM  -ID tele-consult pending    - MRSA PCR pending  - Covid, RSV, influenza pending  - UA and urine culture reordered on admission and pending, but collected after being on antibiotics    - blood culture from 10/25 pending, collected after starting antibiotics         Hypotension - suspect due to septic shock vs acute adrenal insufficiency  On daily prednisone for transplant.    Treating shock as above.   - for possible adrenal insufficiency, gave hydrocortisone 100 mg x 1 then 50 mg every 6 hours starting AM 10/25.  Reassess 10/26  - continued home prednisone in the background even while on this per transplant recommendations.    - attempted to place PICC 10/25 with plan to start noerpinephrine prn - however PICC team was unable to place PICC line.  In the meantime patient's blood pressure improved to 100's systolic.  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 No hypoxia or shortness of breath. Lungs clear on auscultation. Managed PTA with prednisone 5 mg AM, prednisone 5 mg PM, tacrolimus 3 mg BID, dapsone 40 mg for PJP prophylaxis, montelukast 10 mg, azithromycin three times per week, Advair inhaler  - Continued prednisone BID, tacrolimus, dapsone, and montelukast.  - Duonebs PRN available  - discussed with transplant team from [facility] 10/25 - agreed with current plan as above.  Continue prednisone and tacrolimus.  They agreed with continuing current cares here for now.    - recommended checking tacrolimus trough with AM labs 10/26, pending.       Essential hypertension    Chronic. Blood pressure elevated 169/76. Managed PTA with carvedilol 6.25 mg BID and furosemide 20 mg.   - Held furosemide and coreg given hypotension/shock as above - reassess 10/25       Coronary artery disease (CAD) s/p stenting  Dyslipidemia    Follows with cardiology, last seen 8/2024. CAD s/p PCI with MEGHNA x 1 to LAD (1/11/24) and PCI with MEGHNA x 1 to RCA (2/16/24). Managed PTA with DAPT (ASA 81 mg and clopidogrel 75 mg). He is to be on DAPT x 1 year followed by lifelong ASA 81 mg monotherapy. Also taking rosuvastatin 20 mg three times per week.  - Continued ASA and clopidogrel - reassess if hemoglobin/platelets continue to drop   - Continued rosuvastatin   - held coreg as above - reassess 10/25       Bilateral below knee amputation (BKA)  H/o bilateral chronic limb-threatening ischemia   Peripheral arterial disease    S/p bilateral BKA (left 9/25 and right 8/28). H/o significant PAD and noted to have DVT in setting of HIT  and recurrence of DVT and in setting of PAD. PTA apixiban 2.5 mg BID.  - Continue apixiban BID  - WOC for left stump wounds      History of PE/DVTs  - continued home apixaban - reassess if hemoglobin/platelets continue to drop       History of diffuse b-cell lymphoma of esophagus  - diagnosed after GI bleed in 3/24.  Started on weekly rituximab x 4 doses in 4/2024.  Restaging PET after this showed good response with decreased hypermetabolism in distal duodenum.    - held off on further rituximab given plan for vascular surgery for non-healing foot ulcers.    - follows with Dr. Drake ([facility] oncology) and perhaps with a local oncologist as well.  Plan was for repeat PET scan in 3 months, which would be this month but I don't see this scheduled.  Recommend follow-up with oncology on discharge.         Pancytopenia, new  Suspect leukopenia and thrombocytopenia are due to sepsis     WBC 2.0 ? 1.4     Hemoglobin 10.5 ? 9.2 - has chronic anemia as below    platelets 143 ? 123         Chronic anemia    Appears stable. Hemoglobin 10.5. Baseline hemoglobin 8.0 - 10.0.    following, baseline so far       Chronic kidney disease state 3b    Appears stable. Creatine 0.82 and GFR >90. Baseline creatine near 0.90 - 1.0.  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 BG 78. Last hemoglobin A1c 4.2% 8/2024. Managed PTA with prandial insulin aspart 5 U with breakfast and 3 U with lunch/dinner. Basal insulin glargine 18 U in AM.  - Continue basal insulin glargine 18 U in AM  - Continue prandial insulin aspart 5 U with breakfast and 3 U with lunch/dinner  - Medium resistance SSI  - may need increased insulin after starting high dose steroids 10/25 - following       History of Recurrent CMV viremia     Managed PTA with acyclovir 400 mg BID.  - Continue acyclovir BID       Hypokalemia   Due to IVF/poor intake - started on RN replacement protocol 10/25      Hypomagnesemia    Magnesium 1.5.  -  "Replace with RN driven protocol       Hyponatremia  Mild, following with IVF as above       Gastroesophageal reflux disease (GERD)    Chronic. Managed PTA with pantoprazole 40 mg  - Continue pantoprazole         Clinically Significant Risk Factors Present on Admission     # Drug Induced Coagulation Defect: home medication list includes an anticoagulant medication  # Drug Induced Platelet Defect: home medication list includes an antiplatelet medication   # Hypertension: Noted on problem list    # Anemia: based on hgb <11  # Financial/Environmental Concerns:        Naranjo Catheter: Not present  Code Status: Full Code    Lines: PICC placed for possible pressors 10/25         Prophylaxis  Continued home apixaban       Diet  Orders Placed This Encounter      Moderate Consistent Carb (60 g CHO per Meal) Diet                    Disposition Plan        Medically Ready for Discharge: Anticipated in 2-4 Days                  Tai Zelaya MD  Hospitalist Service  [facility]  Securely message with the Vocera Web Console (learn more here)  Text page via [email or web address] Paging/Directory             Interval History:   Patient confused but answering simple yes/no questions today.  Denies pain, denies dyspnea.  Says he feels \"not good\".  No focal complaints.                  Review of Systems:   Hard to assess but appears negative as able to determine           Medications:   Current active medications and PTA medications reviewed, see medication list for details.            Physical Exam:   Vitals were reviewed  Patient Vitals for the past 24 hrs:   BP Temp Temp src Pulse Resp SpO2 Weight   10/25/24 0818 (!) 67/35 -- -- 101 -- 94 % --   10/25/24 0758 -- -- -- 105 -- 93 % --   10/25/24 0756 (!) 79/35 98.6  F (37  C) Oral -- 20 -- --   10/25/24 0619 (!) 85/45 97.9  F (36.6  C) Oral 97 -- 93 % --   10/25/24 0259 (!) 193/93 -- -- (!) 131 18 -- --   10/25/24 0236 -- (!) 102.7  F (39.3  C) Oral -- -- -- --   10/25/24 " 0148 (!) 145/65 (!) 100.8  F (38.2  C) Oral 114 18 93 % --   10/24/24 2247 (!) 169/76 (!) 102.8  F (39.3  C) Oral 108 20 94 % 58.4 kg (128 lb 12 oz)       Temperatures:  Current - Temp: 98.6  F (37  C); Max - Temp  Av.6  F (38.1  C)  Min: 97.9  F (36.6  C)  Max: 102.8  F (39.3  C)  Respiration range: Resp  Av  Min: 18  Max: 20  Pulse range: Pulse  Av.3  Min: 97  Max: 131  Blood pressure range: Systolic (24hrs), Av , Min:67 , Max:193   ; Diastolic (24hrs), Av, Min:35, Max:93    Pulse oximetry range: SpO2  Av.4 %  Min: 93 %  Max: 94 %  I/O last 3 completed shifts:  In: -   Out: 25 [Urine:25]    Intake/Output Summary (Last 24 hours) at 10/25/2024 0855  Last data filed at 10/24/2024 2331  Gross per 24 hour   Intake --   Output 25 ml   Net -25 ml     EXAM:  General: awake but confused as above  Head: normocephalic  Neck: unremarkable, no lymphadenopathy   HEENT: oropharynx pink and moist    Heart: Regular rate and rhythm, no murmurs, rubs, or gallops  Lungs: clear to auscultation bilaterally with good air movement throughout  Abdomen: soft, non-tender, no masses or organomegaly  Extremities: no edema in lower extremities - s/p bilateral BKAs  Skin BKAs appear to be healing well, some weeping on more recent wounds but no erythema or concerning findings - doubt infection of this area but following.  Skin otherwise unremarkable as visualized.               Data:     Reviewed data:  Results for orders placed or performed during the hospital encounter of 10/24/24 (from the past 24 hours)   CBC with platelets   Result Value Ref Range    WBC Count 2.0 (L) 4.0 - 11.0 10e3/uL    RBC Count 3.36 (L) 4.40 - 5.90 10e6/uL    Hemoglobin 10.5 (L) 13.3 - 17.7 g/dL    Hematocrit 34.0 (L) 40.0 - 53.0 %     (H) 78 - 100 fL    MCH 31.3 26.5 - 33.0 pg    MCHC 30.9 (L) 31.5 - 36.5 g/dL    RDW 16.1 (H) 10.0 - 15.0 %    Platelet Count 143 (L) 150 - 450 10e3/uL   Comprehensive metabolic panel   Result Value  Ref Range    Sodium 134 (L) 135 - 145 mmol/L    Potassium 4.4 3.4 - 5.3 mmol/L    Carbon Dioxide (CO2) 17 (L) 22 - 29 mmol/L    Anion Gap 14 7 - 15 mmol/L    Urea Nitrogen 30.1 (H) 8.0 - 23.0 mg/dL    Creatinine 0.82 0.67 - 1.17 mg/dL    GFR Estimate >90 >60 mL/min/1.73m2    Calcium 7.7 (L) 8.8 - 10.4 mg/dL    Chloride 103 98 - 107 mmol/L    Glucose 78 70 - 99 mg/dL    Alkaline Phosphatase 138 40 - 150 U/L    AST 38 0 - 45 U/L    ALT 43 0 - 70 U/L    Protein Total 5.1 (L) 6.4 - 8.3 g/dL    Albumin 2.5 (L) 3.5 - 5.2 g/dL    Bilirubin Total 0.5 <=1.2 mg/dL   CRP inflammation   Result Value Ref Range    CRP Inflammation 296.29 (H) <5.00 mg/L   Lactic acid whole blood   Result Value Ref Range    Lactic Acid 2.2 (H) 0.7 - 2.0 mmol/L   Lipase   Result Value Ref Range    Lipase 26 13 - 60 U/L   Magnesium   Result Value Ref Range    Magnesium 1.5 (L) 1.7 - 2.3 mg/dL   Phosphorus   Result Value Ref Range    Phosphorus 2.9 2.5 - 4.5 mg/dL   Procalcitonin   Result Value Ref Range    Procalcitonin 16.11 (HH) <0.50 ng/mL   Glucose by meter   Result Value Ref Range    GLUCOSE BY METER POCT 70 70 - 99 mg/dL   CT Chest/Abdomen/Pelvis w Contrast    Narrative    EXAM: CT CHEST/ABDOMEN/PELVIS W CONTRAST  LOCATION: [facility]  DATE: 10/25/2024    INDICATION: Sepsis. Abdominal pain. Immunocompromised state. History of lung transplant. Diffuse large B-cell lymphoma.  COMPARISON: CT abdomen and pelvis 09/20/2024. CT chest 08/30/2024. PET/CT 04/13/2024. CT chest, abdomen, and pelvis 04/13/2024.  TECHNIQUE: CT scan of the chest, abdomen, and pelvis was performed following injection of IV contrast. Multiplanar reformats were obtained. Dose reduction techniques were used.   CONTRAST: 63 mL Isovue 370.    FINDINGS:   LUNGS AND PLEURA: New 4 mm nodule right upper lobe. New 5 mm nodule inferior aspect right upper lobe. Hazy ill-defined 6 mm nodule right middle lobe laterally is also new. Previous dense consolidation  in the right lower lobe has decreased with linear   atelectasis now present in this area. Minimal atelectasis left lung base. Minimal left basilar pleural fluid.    MEDIASTINUM/AXILLAE: Cardiac enlargement without pericardial fluid. No lymphadenopathy. Postoperative changes related to prior lung transplant.    CORONARY ARTERY CALCIFICATION: Severe.    HEPATOBILIARY: Gallbladder distention. No cholelithiasis. Mild extrahepatic biliary prominence without choledocholithiasis. Hepatic steatosis.    PANCREAS: Unchanged pancreatic cyst extending from the posterior aspect of the pancreatic head inferiorly. No ductal dilatation or inflammatory change.    SPLEEN: Normal.    ADRENAL GLANDS: Normal.    KIDNEYS/BLADDER: No urinary collecting system dilatation or obstructing calculi. Symmetric renal enhancement. Diffuse bladder wall thickening. Prostate enlargement.    BOWEL: No obstruction or inflammatory change. Colonic diverticulosis. Appendix unremarkable.    LYMPH NODES: No lymphadenopathy.    VASCULATURE: Normal caliber aorta. Moderate to marked diffuse atherosclerotic vascular calcification.    PELVIC ORGANS: Moderate prostate enlargement. Heterogeneous enhancement diffusely throughout the prostate.    MUSCULOSKELETAL: Degenerative hypertrophic changes in the spine. Postoperative changes inferior aspect of the sternum.      Impression    IMPRESSION:  1.  Interval development of new hazy ill-defined pulmonary nodules involving the right upper lobe and right middle lobe having the appearance of an acute infectious/inflammatory etiology.    2.  Interval decrease in prior consolidation posterior right lower lobe with minimal residual atelectasis remaining right lower lobe. Minimal atelectasis left lower lobe with chronic left basilar pleural fluid remaining stable.    3.  No new lymphadenopathy.    4.  Unchanged pancreatic cyst.    5.  Heterogeneous enhancement to a moderately enlarged prostate.   Glucose by meter   Result  Value Ref Range    GLUCOSE BY METER POCT 68 (L) 70 - 99 mg/dL   Lactic Acid Whole Blood w/ 1x repeat in 2 hrs when >2   Result Value Ref Range    Lactic Acid, Initial 2.8 (H) 0.7 - 2.0 mmol/L   CBC with platelets differential    Narrative    The following orders were created for panel order CBC with platelets differential.  Procedure                               Abnormality         Status                     ---------                               -----------         ------                     CBC with platelets and d...[[id number]]  Abnormal            Final result               RBC and Platelet Morphology[[id number]]  Abnormal            Final result                 Please view results for these tests on the individual orders.   Comprehensive metabolic panel   Result Value Ref Range    Sodium 132 (L) 135 - 145 mmol/L    Potassium 3.1 (L) 3.4 - 5.3 mmol/L    Carbon Dioxide (CO2) 16 (L) 22 - 29 mmol/L    Anion Gap 14 7 - 15 mmol/L    Urea Nitrogen 26.9 (H) 8.0 - 23.0 mg/dL    Creatinine 0.87 0.67 - 1.17 mg/dL    GFR Estimate >90 >60 mL/min/1.73m2    Calcium 7.4 (L) 8.8 - 10.4 mg/dL    Chloride 102 98 - 107 mmol/L    Glucose 68 (L) 70 - 99 mg/dL    Alkaline Phosphatase 113 40 - 150 U/L    AST 33 0 - 45 U/L    ALT 34 0 - 70 U/L    Protein Total 4.3 (L) 6.4 - 8.3 g/dL    Albumin 2.2 (L) 3.5 - 5.2 g/dL    Bilirubin Total 0.5 <=1.2 mg/dL   Lactic acid whole blood   Result Value Ref Range    Lactic Acid 3.7 (H) 0.7 - 2.0 mmol/L   CRP inflammation   Result Value Ref Range    CRP Inflammation 291.16 (H) <5.00 mg/L   Blood gas venous   Result Value Ref Range    pH Venous 7.41 7.32 - 7.43    pCO2 Venous 29 (L) 40 - 50 mm Hg    pO2 Venous 44 25 - 47 mm Hg    Bicarbonate Venous 18 (L) 21 - 28 mmol/L    Base Excess/Deficit Venous -5.8 (L) -3.0 - 3.0 mmol/L    FIO2 21     Oxyhemoglobin Venous 77 (H) 70 - 75 %    O2 Sat, Venous 78.6 (H) 70.0 - 75.0 %    Narrative    In healthy individuals, oxyhemoglobin (O2Hb) and oxygen saturation  (SO2) are approximately equal. In the presence of dyshemoglobins, oxyhemoglobin can be considerably lower than oxygen saturation.   CBC with platelets and differential   Result Value Ref Range    WBC Count 1.4 (L) 4.0 - 11.0 10e3/uL    RBC Count 2.88 (L) 4.40 - 5.90 10e6/uL    Hemoglobin 9.2 (L) 13.3 - 17.7 g/dL    Hematocrit 28.7 (L) 40.0 - 53.0 %     78 - 100 fL    MCH 31.9 26.5 - 33.0 pg    MCHC 32.1 31.5 - 36.5 g/dL    RDW 16.1 (H) 10.0 - 15.0 %    Platelet Count 120 (L) 150 - 450 10e3/uL    % Neutrophils 33 %    % Lymphocytes 50 %    % Monocytes 14 %    % Eosinophils 1 %    % Basophils 1 %    % Immature Granulocytes 1 %    NRBCs per 100 WBC 0 <1 /100    Absolute Neutrophils 0.5 (L) 1.6 - 8.3 10e3/uL    Absolute Lymphocytes 0.7 (L) 0.8 - 5.3 10e3/uL    Absolute Monocytes 0.2 0.0 - 1.3 10e3/uL    Absolute Eosinophils 0.0 0.0 - 0.7 10e3/uL    Absolute Basophils 0.0 0.0 - 0.2 10e3/uL    Absolute Immature Granulocytes 0.0 <=0.4 10e3/uL    Absolute NRBCs 0.0 10e3/uL   RBC and Platelet Morphology   Result Value Ref Range    RBC Morphology Confirmed RBC Indices     Platelet Assessment  Automated Count Confirmed. Platelet morphology is normal.     Automated Count Confirmed. Platelet morphology is normal.    Reactive Lymphocytes Present (A) None Seen   Glucose by meter   Result Value Ref Range    GLUCOSE BY METER POCT 47 (LL) 70 - 99 mg/dL     *Note: Due to a large number of results and/or encounters for the requested time period, some results have not been displayed. A complete set of results can be found in Results Review.       All imaging studies reviewed by me.    Attestation:  I have reviewed today's vital signs, notes, medications, labs and imaging.  Amount of time spent managing this patient today providing critical care:  135 minutes.

## 2024-10-25 NOTE — CONSULTS
Care Management Initial Consult    General Information  Assessment completed with: Other (TCU staff),    Type of CM/SW Visit: Initial Assessment    Primary Care Provider verified and updated as needed: Yes   Readmission within the last 30 days: other (see comments)   Reason for Consult: discharge planning  Advance Care Planning: Advance Care Planning Reviewed: no concerns identified        Communication Assessment  Patient's communication style: spoken language (English or Bilingual)    Hearing Difficulty or Deaf: yes   Wear Glasses or Blind: yes    Cognitive  Cognitive/Neuro/Behavioral: .WDL except, orientation  Level of Consciousness: confused  Arousal Level: opens eyes spontaneously  Orientation: disoriented to, situation, time  Mood/Behavior: calm, cooperative     Speech: incoherent (spouse at bedside also said soft spoken)    Living Environment:   People in home: facility resident     Current living Arrangements: extended care facility  [facility] of Facility: [facility]   Able to return to prior arrangements: yes     Family/Social Support:  Care provided by: other (see comments) (TCU staff)  Provides care for: no one, unable/limited ability to care for self  Marital Status:   Support system: Wife, Children          Description of Support System: Supportive, Involved    Support Assessment: Adequate family and caregiver support, Adequate social supports    Current Resources:   Patient receiving home care services: No  Community Resources: Transitional Care  Equipment currently used at home: grab bar, toilet, grab bar, tub/shower, wheelchair, manual, lift device  Supplies currently used at home: None    Employment/Financial:  Employment Status: retired     Financial Concerns: none   Does the patient's insurance plan have a 3 day qualifying hospital stay waiver?  No    Lifestyle & Psychosocial Needs:  Social Drivers of Health     Food Insecurity: Low Risk  (10/24/2024)    Food Insecurity     Within the  past 12 months, did you worry that your food would run out before you got money to buy more?: No     Within the past 12 months, did the food you bought just not last and you didn t have money to get more?: No   Depression: Not at risk (8/20/2024)    PHQ-2     PHQ-2 Score: 0   Housing Stability: Low Risk  (10/24/2024)    Housing Stability     Do you have housing? : Yes     Are you worried about losing your housing?: No   Tobacco Use: Medium Risk (9/20/2024)    Patient History     Smoking Tobacco Use: Former     Smokeless Tobacco Use: Never     Passive Exposure: Past   Financial Resource Strain: Low Risk  (10/24/2024)    Financial Resource Strain     Within the past 12 months, have you or your family members you live with been unable to get utilities (heat, electricity) when it was really needed?: No   Alcohol Use: Not At Risk (9/10/2019)    AUDIT-C     Frequency of Alcohol Consumption: Never     Average Number of Drinks: Not on file     Frequency of Binge Drinking: Not on file   Transportation Needs: Low Risk  (10/24/2024)    Transportation Needs     Within the past 12 months, has lack of transportation kept you from medical appointments, getting your medicines, non-medical meetings or appointments, work, or from getting things that you need?: No   Physical Activity: Not on file   Interpersonal Safety: Low Risk  (10/24/2024)    Interpersonal Safety     Do you feel physically and emotionally safe where you currently live?: Yes     Within the past 12 months, have you been hit, slapped, kicked or otherwise physically hurt by someone?: No     Within the past 12 months, have you been humiliated or emotionally abused in other ways by your partner or ex-partner?: No   Stress: Not on file   Social Connections: Not on file   Health Literacy: Not on file     Functional Status:  Prior to admission patient needed assistance:   Dependent ADLs:: Bathing, Dressing, Grooming, Incontinence, Positioning, Transfers, Wheelchair-with  assist, Toileting  Dependent IADLs:: Cleaning, Cooking, Laundry, Shopping, Meal Preparation, Medication Management, Money Management, Transportation     Mental Health Status:  Mental Health Status: No Current Concerns       Chemical Dependency Status:  Chemical Dependency Status: No Current Concerns           Values/Beliefs:  Spiritual, Cultural Beliefs, Congregation Practices, Values that affect care: no             Discussed  Partnership in Safe Discharge Planning  document with patient/family: No    Additional Information:    Per IDT rounds, patient will likely remain hospitalized for several days. Referral received due to elevated readmission score. Per chart review, patient hospitalized from [facility] TCU.     [facility] patient does have a bed hold in place and can return to facility when medically ready for discharge.     CM will remain involved to assist with discharge back to TCU    PLAN: Return to [facility]  Transport: Agency    AGNES VILLANUEVA RN

## 2024-10-25 NOTE — PROGRESS NOTES
CLINICAL NUTRITION SERVICES - ASSESSMENT NOTE     Nutrition Prescription    RECOMMENDATIONS FOR MDs/PROVIDERS TO ORDER:  Make patient NPO for diet if unable to safely take in PO at this time.    Malnutrition Status:    Moderate malnutrition in the context of acute on chronic illness.     Recommendations already ordered by Registered Dietitian (RD):  None at this time d/t pt reported to be unsafe for PO intake    Future/Additional Recommendations:  Monitor patient weight, diet changes, meds/labs and GI/BM  Follow up to complete full nutrition assessment/ consideration of nutrition supplements once patient condition improves.     REASON FOR ASSESSMENT  Aubrey Duncan is a/an 69 year old male assessed by the dietitian for Admission Nutrition Risk Screen for positive    NUTRITION HISTORY  Aubrey Duncan is a 69 year old male who presents on 10/24/2024 with lethargy. Patient admitted with sepsis, penumonia, cellulitis of left stump, UTI, encephalopathy, HTN,CAD, dyslipidemia, BKA, PAD, H/o bilateral lung transplant, COPD, alpha-1 antitrypsin deficiency, immunocompromised, hypomagnesemia, pancytopenia, anemia, CKD stage 3b, DM type 2, recurrent CMV viremia, and GERD.    Patient transfer to ICU later this AM d/t hypotensive and hypoglycemia event. RD went to briefly visit with patient in ICU. RD spoke to patient RN who noted that patient is not appropriate for nutrition evaluation at this time. Per RN patient is not safe for any PO intake at this time.    CURRENT NUTRITION ORDERS  Diet: Orders Placed This Encounter      Moderate Consistent Carb (60 g CHO per Meal) Diet      Intake/Tolerance: No recorded meal intakes in patient chart.    LABS  Labs reviewed  Sodium decreased-132 mg/dL  Potassium decreased-3.1 mg/dL  Carbon dioxide decreased-16 mmol/L  Urea nitrogen elevated-26.9 mg/dL  Calcium decreased-7.4 g/dL  Albumin decreased-2.2 g/dL  Protein total decreased-4.3 g/dL  CRP inflammation elevated-291.16 mg/L  Glucose  decreased-47 mg/dL  Lactic acid elevated-3.7 mmol/L    MEDICATIONS  Medications reviewed  Scheduled: Zovirax, eliquis, saline, Novolog (not given this AM d/t low BG), Lantus, LR bolus, Protonix, Zosyn, crestor, tacrolimus, vancocin, expedite  Continuous: LR infusion  PRN: Dextrose injection 50%    ANTHROPOMETRICS  Height: 0 cm (Data Unavailable)  Most Recent Weight: 58.4 kg (128 lb 12 oz)    IBW: 63.6 kg-adjusted for BKA  BMI: Normal BMI   Weight History:   Wt Readings from Last 15 Encounters:   10/24/24 58.4 kg (128 lb 12 oz)   09/29/24 64.8 kg (142 lb 13.7 oz)   09/20/24 61.2 kg (135 lb)   09/18/24 60.4 kg (133 lb 3.2 oz)   09/16/24 64.5 kg (142 lb 3.2 oz)   09/12/24 64.5 kg (142 lb 3.2 oz)   09/09/24 109.9 kg (242 lb 3.2 oz)   09/05/24 62 kg (136 lb 9.6 oz)   08/27/24 67.7 kg (149 lb 4 oz)   08/20/24 65.3 kg (144 lb)   08/13/24 65.3 kg (144 lb)   08/07/24 68.2 kg (150 lb 6.4 oz)   08/02/24 69.4 kg (153 lb)   08/01/24 69.7 kg (153 lb 9.6 oz)   07/31/24 70.3 kg (155 lb)   9.9% significant weight loss s/p BKA surgery on 9/25. Patient still meets criteria for significant weight loss.     Dosing Weight: 58.4 kg-actual BW used    ASSESSED NUTRITION NEEDS  Estimated Energy Needs: 1,168-1,284 kcals/day (20-22 kcals/kg)  Justification: Maintenance, s/p BKA  Estimated Protein Needs: 70-88 grams protein/day (1.2 - 1.5 grams of pro/kg)  Justification: Increased needs  Estimated Fluid Needs: 1,168-1,284 mL/day (1 mL/kcal)   Justification: Per provider pending fluid status    PHYSICAL FINDINGS  See malnutrition section below.  S/p BKA  Bilateral lung transplant    MALNUTRITION  % Intake: Unable to assess  % Weight Loss: > 7.5% in 3 months (severe)  Subcutaneous Fat Loss: Facial region:  mild to moderate Buccal (visual only)  Muscle Loss: Unable to assess  Fluid Accumulation/Edema: None noted  Malnutrition Diagnosis: Moderate malnutrition in the context of acute on chronic illness.     NUTRITION DIAGNOSIS  Malnutrition related  "to poor oral intakes as evidenced by 9.9% significant weight loss and mild to moderate buccal loss.     INTERVENTIONS  Implementation  Nutrition Education: Not appropriate at this time due to patient condition   Collaboration with other providers-IDT rounds  Follow patient condition to assess for appropriateness of nutrition assessment     Goals  Patient to consume % of nutritionally adequate meal trays TID, or the equivalent with supplements/snacks.    If patient made NPO diet adv nutrition support 2-3 days.    Monitoring/Evaluation  Progress toward goals will be monitored and evaluated per protocol.  Alma Gold RDN, LD  Clinical Dietitian  Office: [phone]  Hours: M-F 8-3pm   Weekend/Holiday MAYITO Latif - \"Weekend Clinical Dietitian\" (No longer using pager)    "

## 2024-10-25 NOTE — CONSULTS
"[facility]  WO Nurse Inpatient Assessment     Consulted for: L BKA Stump    Summary: Adding moisture as wound is dry. Pt is currently not at baseline cognition, at risk for removing dressing. Was transferred to ICU r/t hypotension after change of shift.     Pt is scheduled with out-patient follow up with vascular 11/24.    Patient History (according to provider note(s):      Aubrey Duncan is a 69 year old male who presents on 10/24/2024 with lethargy. On evaluation he is septic with fever, tachycardia, and elevated lactic acid. CT chest/abdomen/pelvis showing possible pneumonia and urinalysis UTI. Recent left BKA with wound dehiscence and minimal purulent drainage, no obvious cellulitis. Unclear source of infection, however started on vancomycin and Zosyn with fluid bolus followed by maintenance...    Bilateral below knee amputation (BKA)  H/o bilateral chronic limb-threatening ischemia   Peripheral arterial disease    S/p bilateral BKA (left 9/25 and right 8/28). H/o significant PAD and noted to have DVT in setting of HIT and recurrence of DVT and in setting of PAD. PTA apixiban 2.5 mg BID.  - Continue apixiban BID  - WO for left stump wounds     Assessment:      Areas visualized during today's visit: Left BKA    Wound location: L BKA    10/25/24     10/25/24    Last photo: 10/25/24  Wound due to: Surgical Wound  Wound history/plan of care: surgery 9/25/24  Wound base: mix of dry pink/red (60%) and non-viable brown, yellow (40%); new      Palpation of the wound bed: normal      Drainage: scant     Description of drainage: serosanguinous     Measurements (length x width x depth, in cm): central area is healed; max length is 2cm x 20cm x 0.4cm     Tunneling: N/A     Undermining: N/A  Periwound skin: Intact      Color: normal and consistent with surrounding tissue      Temperature: normal   Odor: none  Pain: during dressing change,  voicing \"it hurts\" and pulling leg away  Pain " interventions prior to dressing change: patient tolerated well, slow and gentle cares , and distraction  Treatment goal: Infection control/prevention, Increase granulation, and Increase moisture   STATUS: initial assessment  Supplies ordered: gathered, supplies stored on unit, discussed with RN, and discussed with patient       Treatment Plan:     L BKA wound(s): Daily  and PRN  Clean with Vashe soak (Vashe on gauze 5-10 minutes). Then wipe wound/surrounding skin with Vashe/gauze and pat dry.  Apply no sting barrier film to surrounding skin.  Apply thin layer of hydrogel to open wound.  Cover with piece of Xeroform cut to fit.  Cover with gauze or ABD pad and secure with rolled gauze, tape.  Elevate BL BKA on pillows so that distal portion is floating.     Orders: Written    RECOMMEND PRIMARY TEAM ORDER: None, at this time  Education provided: plan of care and Infection prevention   Discussed plan of care with: Patient and Nurse  WOC nurse follow-up plan: weekly  Notify WOC if wound(s) deteriorate.  Nursing to notify the Provider(s) and re-consult the WOC Nurse if new skin concern.    DATA:     Current support surface: Standard  Low air loss (RICHY pump, Isolibrium, Pulsate)  Containment of urine/stool: Incontinence Protocol and Incontinent pad in bed  BMI: Body mass index is 20.16 kg/m .   Active diet order: Orders Placed This Encounter      Moderate Consistent Carb (60 g CHO per Meal) Diet     Output: I/O last 3 completed shifts:  In: -   Out: 25 [Urine:25]     Labs:   Recent Labs   Lab 10/25/24  0554   ALBUMIN 2.2*   HGB 9.2*   WBC 1.4*     Pressure injury risk assessment:   Sensory Perception: 3-->slightly limited  Moisture: 4-->rarely moist  Activity: 2-->chairfast  Mobility: 2-->very limited  Nutrition: 2-->probably inadequate  Friction and Shear: 2-->potential problem  Stevenson Score: 15    Annette Winston RN, CWOCN   Pager no longer is use, please contact through [employer] group: [facility] Nurse Dash

## 2024-10-25 NOTE — PROGRESS NOTES
Order clarified for sodium biocarb IVP, patient has no central line so MD Fraga will change to continuous infusion.  Charge RN updated.

## 2024-10-25 NOTE — PROGRESS NOTES
[facility] [facility] [place]MISSION NOTE    Patient admitted to room 2313 at approximately 2241 via cart from [place]. Patient was accompanied by other:EMS.     Verbal SBAR report received from EMS personnel prior to patient arrival.     Patient trasferred to bed via lift sheet Patient alert and oriented X 2. The patient is not having any pain.  . Admission vital signs: Blood pressure (!) 145/65, pulse 114, temperature (!) 102.7  F (39.3  C), temperature source Oral, resp. rate 18, weight 58.4 kg (128 lb 12 oz), SpO2 93%. Patient was oriented to plan of care, call light, bed controls, tv, telephone, bathroom, and visiting hours.     Risk Assessment    The following safety risks were identified during admission: fall. Yellow risk band applied: YES.     Skin Initial Assessment    This writer admitted this patient and completed a full skin assessment and Stevenson score in the Adult PCS flowsheet.   Photo documentation of skin problem and/or wound competed via [employer] application (located under Media):  N/A    Appropriate interventions initiated as needed.     Secondary skin check completed by Lobo RUIZ RN.    Stevenson Risk Assessment  Sensory Perception: 3-->slightly limited  Moisture: 4-->rarely moist  Activity: 2-->chairfast  Mobility: 2-->very limited  Friction and Shear: 2-->potential problem    Education    Patient has a Agenda to Observation order: No  Observation education completed and documented: N/A      Jie Alarcon RN

## 2024-10-25 NOTE — PROGRESS NOTES
Patient transferred from [facility] 2313 to ICU 4, Hypotensive and hypoglycemic.  Report received from Inessa CAMPA.

## 2024-10-25 NOTE — DISCHARGE SUMMARY
[facility] Discharge Summary    Aubrey Duncan MRN# [id number]   Age: 69 year old YOB: 1955     Date of Admission:  10/24/2024  Date of Discharge::  10/25/2024  Admitting Physician:  Tai Zelaya MD  Discharge Physician:  Tai Zelaya MD             Admission Diagnoses:   Sepsis  Sepsis (H)          Principle Discharge Diagnosis:       Sepsis with septic shock - most likely due to pneumonia vs UTI    Doubt cellulitis of left stump  Encephalopathy due to sepsis   Complicated by immune suppression due to lung transplant as below     See hospital course for further active diagnoses addressed during this admission.                 Medications Prior to Admission:     Medications Prior to Admission   Medication Sig Dispense Refill Last Dose/Taking    acetaminophen (TYLENOL) 325 MG tablet Take 2 tablets (650 mg) by mouth every 6 hours as needed for mild pain 60 tablet 0 10/23/2024 Evening    acyclovir (ZOVIRAX) 400 MG tablet TAKE 1 TABLET (400 MG) BY MOUTH 2 TIMES DAILY 60 tablet 3 10/24/2024 Morning    albuterol (PROAIR HFA/PROVENTIL HFA/VENTOLIN HFA) 108 (90 Base) MCG/ACT inhaler Inhale 1-2 puffs into the lungs every 6 hours as needed for shortness of breath or wheezing 8.5 g 3 More than a month    apixaban ANTICOAGULANT (ELIQUIS) 5 MG tablet Take 1 tablet (5 mg) by mouth 2 times daily.   10/24/2024 Morning    aspirin (ASA) 81 MG EC tablet Take 1 tablet (81 mg) by mouth daily 90 tablet 3 10/23/2024    azithromycin (ZITHROMAX) 250 MG tablet Take 250 mg by mouth Every Mon, Wed, Fri Morning   10/23/2024 Morning    bisacodyl (DULCOLAX) 10 MG suppository Place 1 suppository (10 mg) rectally daily as needed for constipation (Use if magnesium hydroxide (MILK of MAGNESIA) is not effective after 24 hours. May discontinue if patient having bowel movement.).   More than a month    Calcium Carbonate-Vitamin D 600-10 MG-MCG TABS Take 1 tablet by mouth 2 times daily (with meals) 60 tablet 11 10/24/2024  Morning    carvedilol (COREG) 6.25 MG tablet TAKE 1 TABLET (6.25 MG) BY MOUTH 2 TIMES DAILY (WITH MEALS) 120 tablet 3 10/24/2024 Morning    dapsone (ACZONE) 25 MG tablet Take 2 tablets (50 mg) by mouth daily 180 tablet 3 10/23/2024    diclofenac (VOLTAREN) 1 % topical gel Apply 2 g topically 2 times daily as needed for moderate pain   More than a month    Ferrous Sulfate Dried (HIGH POTENCY IRON) 65 MG TABS Take 1 tablet by mouth every morning.   10/24/2024 Morning    fludrocortisone (FLORINEF) 0.1 MG tablet Take 1 tablet (0.1 mg) by mouth daily 90 tablet 3 10/24/2024 Morning    fluticasone-salmeterol (ADVAIR-HFA) 230-21 MCG/ACT inhaler Inhale 2 puffs into the lungs 2 times daily 8 g 11 10/24/2024 Morning    furosemide (LASIX) 20 MG tablet Take 1 tablet (20 mg) by mouth daily 90 tablet 4 10/24/2024 Morning    HYDROmorphone (DILAUDID) 2 MG tablet Take 0.5 tablets (1 mg) by mouth every 6 hours as needed for severe pain. 15 tablet  Past Week    insulin aspart (NOVOLOG FLEXPEN) 100 UNIT/ML pen Inject 5 Units subcutaneously daily (with breakfast). Do not give if blood sugar < 70 mg/dL.   10/24/2024 Morning    insulin aspart (NOVOLOG PEN) 100 UNIT/ML pen Inject 3 Units subcutaneously 2 times daily (with meals). Lunch & supper   10/23/2024 at  4:30 PM    insulin glargine (LANTUS PEN) 100 UNIT/ML pen Inject 18 Units Subcutaneous every morning (before breakfast)   10/24/2024 Morning    loperamide (IMODIUM) 2 MG capsule Take 1 capsule (2 mg) by mouth 4 times daily as needed for diarrhea 120 capsule 12 Past Week    magnesium gluconate (MAGONATE) 500 MG tablet Take 1 tablet (500 mg) by mouth at bedtime.   10/24/2024 Morning    melatonin 1 MG TABS tablet Take 1 tablet (1 mg) by mouth nightly as needed for sleep.   Past Month    metoclopramide (REGLAN) 5 MG tablet Take 1 tablet (5 mg) by mouth every 6 hours as needed (nausea) 30 tablet 0 10/24/2024 at  3:00 AM    montelukast (SINGULAIR) 10 MG tablet Take 1 tablet (10 mg) by mouth  every evening 90 tablet 3 10/23/2024 Evening    multivitamin, therapeutic (THERA-VIT) TABS Take 1 tablet by mouth daily 30 tablet 12 10/24/2024 Morning    pantoprazole (PROTONIX) 40 MG EC tablet Take 1 tablet (40 mg) by mouth daily 90 tablet 1 10/24/2024 Morning    predniSONE (DELTASONE) 5 MG tablet Take 1 tablet by mouth every morning.   10/24/2024 Morning    predniSONE (DELTASONE) 5 MG tablet Take 0.5 tablets by mouth every evening.   10/23/2024 Evening    psyllium (METAMUCIL/KONSYL) capsule Take 2 capsules by mouth 2 times daily.   10/24/2024 Morning    rosuvastatin (CRESTOR) 40 MG tablet Take 20 mg by mouth Every Mon, Wed, Fri Morning   10/23/2024 Morning    senna-docusate (SENOKOT-S/PERICOLACE) 8.6-50 MG tablet Take 1 tablet by mouth 2 times daily.   Past Month    tacrolimus (GENERIC EQUIVALENT) 1 MG capsule Take 3 capsules (3 mg) by mouth 2 times daily. Total dose: 3 mg in AM and 3 mg in  capsule 3 10/24/2024 Morning    thiamine (B-1) 100 MG tablet Take 1 tablet (100 mg) by mouth daily.   10/24/2024 Evening    traMADol 25 MG TABS tablet Take 1 tablet (25 mg) by mouth every 4 hours as needed for moderate pain. 20 tablet 0 Past Week    venlafaxine (EFFEXOR XR) 37.5 MG 24 hr capsule Take 37.5 mg by mouth daily.   10/24/2024 Morning    blood glucose (NO BRAND SPECIFIED) test strip USE TO TEST BLOOD SUGAR 3 TIMES DAILY. DIAG CODE: E11.9 300 strip 3     econazole nitrate 1 % external cream APPLY TOPICALLY DAILY TO FEET AND HEELS. 85 g 3 Unknown    insulin pen needle (32G X 4 MM) 32G X 4 MM miscellaneous Use 4 pen needles daily or as directed. Dispense as insurance allows. Dx. Code: E09.9 400 each 11     magnesium hydroxide (MILK OF MAGNESIA) 400 MG/5ML suspension Take 30 mLs by mouth daily as needed for constipation (Use if polyethylene glycol (Miralax) is not effective after 24 hours.).       Microlet Lancets MISC CHECK BLOOD SUGAR FOUR TIMES DAILY E11.9 400 each 1     order for DME Equipment being ordered:  diabetic shoes 1 each 0     wound support modular (EXPEDITE) LIQD bottle Take 60 mLs by mouth daily.                Current medications on discharge :     Current Facility-Administered Medications   Medication Dose Route Frequency Provider Last Rate Last Admin    acetaminophen (TYLENOL) tablet 650 mg  650 mg Oral Q4H PRN Jese Adams PA-C   650 mg at 10/25/24 1116    Or    acetaminophen (TYLENOL) Suppository 650 mg  650 mg Rectal Q4H PRN Jese Adams PA-C        acyclovir (ZOVIRAX) capsule 400 mg  400 mg Oral BID Jese Adams PA-C   400 mg at 10/25/24 1136    apixaban ANTICOAGULANT (ELIQUIS) tablet 5 mg  5 mg Oral BID Jese Adams PA-C   5 mg at 10/25/24 1136    aspirin EC tablet 81 mg  81 mg Oral Daily Jese Adams PA-C   81 mg at 10/25/24 1003    [Held by provider] azithromycin (ZITHROMAX) tablet 250 mg  250 mg Oral Q Mon Wed Fri AM Jese Adams PA-C        calcium carbonate (TUMS) chewable tablet 1,000 mg  1,000 mg Oral 4x Daily PRN Jese Adams PA-C        [Held by provider] carvedilol (COREG) tablet 6.25 mg  6.25 mg Oral BID w/meals Jese Adams PA-C   6.25 mg at 10/25/24 0259    dapsone (ACZONE) tablet 50 mg  50 mg Oral Daily Jese Adams PA-C   50 mg at 10/25/24 1004    glucose gel 15-30 g  15-30 g Oral Q15 Min PRN Jese Adams PA-C        Or    dextrose 50 % injection 25-50 mL  25-50 mL Intravenous Q15 Min PRN Jese Adams PA-C   25 mL at 10/25/24 0856    Or    glucagon injection 1 mg  1 mg Subcutaneous Q15 Min PRN Jese Adams PA-C        doxycycline (VIBRAMYCIN) 100 mg vial to attach to  mL bag  100 mg Intravenous Q12H Tai Zelaya MD   100 mg at 10/25/24 1258    fludrocortisone (FLORINEF) tablet 0.1 mg  0.1 mg Oral Daily Jese Adams PA-C   0.1 mg at 10/25/24 1004    [Held by provider] furosemide (LASIX) tablet 20 mg  20 mg Oral Daily Jese Adams PA-C        hydrocortisone sodium succinate PF (solu-CORTEF) injection 50 mg  50 mg  Intravenous Q6H Miranda Driver PA-C   50 mg at 10/25/24 1255    HYDROmorphone (DILAUDID) half-tab 1 mg  1 mg Oral Q6H PRN Jese Adams PA-C        insulin aspart (NovoLOG) injection (RAPID ACTING)  5 Units Subcutaneous Daily with breakfast Jese Adams PA-C        insulin aspart (NovoLOG) injection (RAPID ACTING)  1-7 Units Subcutaneous TID AC Jese Adams PA-C        insulin aspart (NovoLOG) injection (RAPID ACTING)  1-5 Units Subcutaneous At Bedtime Jese Adams PA-C        insulin aspart (NovoLOG) injection (RAPID ACTING)  3 Units Subcutaneous BID w/meals Jese Adams PA-C   3 Units at 10/25/24 1720    insulin glargine (LANTUS PEN) injection 18 Units  18 Units Subcutaneous QAM AC Jese Adams PA-C        ipratropium - albuterol 0.5 mg/2.5 mg/3 mL (DUONEB) neb solution 3 mL  3 mL Nebulization Q4H While awake Jese Adams PA-C   3 mL at 10/25/24 1622    lidocaine (LMX4) kit   Topical Q1H PRN Jese Adams PA-C        lidocaine (LMX4) kit   Topical Q1H PRN Miranda Driver PA-C        lidocaine (LMX4) kit   Topical Q1H PRN Tai Zelaya MD        lidocaine (LMX4) kit   Topical Q1H PRN Jese Adams PA-C        lidocaine 1 % 0.1-1 mL  0.1-1 mL Other Q1H PRN Jese Adams PA-C        lidocaine 1 % 0.1-1 mL  0.1-1 mL Other Q1H PRN Miranda Driver PA-C        lidocaine 1 % 0.1-1 mL  0.1-1 mL Other Q1H PRN Jese Adams PA-C        lidocaine 1 % 0.1-5 mL  0.1-5 mL Other Q1H PRN Tai Zelaya MD   1 mL at 10/25/24 1145    montelukast (SINGULAIR) tablet 10 mg  10 mg Oral QPM Jese Adams PA-C   10 mg at 10/25/24 1703    naloxone (NARCAN) injection 0.2 mg  0.2 mg Intravenous Q2 Min PRN Tai Zelaya MD        Or    naloxone (NARCAN) injection 0.4 mg  0.4 mg Intravenous Q2 Min PRN Tai Zleaya MD        Or    naloxone (NARCAN) injection 0.2 mg  0.2 mg Intramuscular Q2 Min PRN Tai Zelaya MD        Or    naloxone (NARCAN) injection 0.4 mg  0.4 mg  Intramuscular Q2 Min PRN Tai Zelaya MD        ondansetron (ZOFRAN ODT) ODT tab 4 mg  4 mg Oral Q6H PRN Jese Adams PA-C        Or    ondansetron (ZOFRAN) injection 4 mg  4 mg Intravenous Q6H PRN Jese Adams PA-C        pantoprazole (PROTONIX) EC tablet 40 mg  40 mg Oral Daily Jese Adams PA-C   40 mg at 10/25/24 1005    Patient is already receiving anticoagulation with heparin, enoxaparin (LOVENOX), warfarin (COUMADIN)  or other anticoagulant medication   Does not apply Continuous PRN Jese Adams PA-C        piperacillin-tazobactam (ZOSYN) 4.5 g vial to attach to  mL bag  4.5 g Intravenous Q6H Jese Adams PA-C 200 mL/hr at 10/25/24 0601 4.5 g at 10/25/24 1124    predniSONE (DELTASONE) tablet 2.5 mg  2.5 mg Oral QPM Tai Zelaya MD   2.5 mg at 10/25/24 1703    predniSONE (DELTASONE) tablet 5 mg  5 mg Oral QAM Tai Zelaya MD   5 mg at 10/25/24 1004    rosuvastatin (CRESTOR) tablet 20 mg  20 mg Oral Q Mon Wed Fri AM Jese Adams PA-C   20 mg at 10/25/24 1123    senna-docusate (SENOKOT-S/PERICOLACE) 8.6-50 MG per tablet 1 tablet  1 tablet Oral BID PRN Jese Adams PA-C        Or    senna-docusate (SENOKOT-S/PERICOLACE) 8.6-50 MG per tablet 2 tablet  2 tablet Oral BID PRN Jese Adams PA-C        sodium bicarbonate 8.4 % injection 50 mEq  50 mEq Intravenous Once Gilberto Fraga MD        sodium chloride (PF) 0.9% PF flush 3 mL  3 mL Intracatheter Q8H Jese Adams PA-C   3 mL at 10/25/24 1005    sodium chloride (PF) 0.9% PF flush 3 mL  3 mL Intracatheter q1 min prn Adams, Jese, PA-C        sodium chloride (PF) 0.9% PF flush 3 mL  3 mL Intracatheter Q8H Miranda Driver PA-C   3 mL at 10/25/24 1407    sodium chloride (PF) 0.9% PF flush 3 mL  3 mL Intracatheter q1 min prn Miranda Driver PA-C        sodium chloride (PF) 0.9% PF flush 3 mL  3 mL Intracatheter Q8H Jese Adams PA-C        sodium chloride (PF) 0.9% PF flush 3 mL  3 mL Intracatheter q1  min prn Jese Adams PA-C        tacrolimus (GENERIC EQUIVALENT) capsule 3 mg  3 mg Oral BID Jese Adams PA-C   3 mg at 10/25/24 1006    traMADol (ULTRAM) half-tab 25 mg  25 mg Oral Q4H PRN Jese Adams PA-C        vancomycin (VANCOCIN) 1,000 mg in 200 mL dextrose intermittent infusion  1,000 mg Intravenous Q18H Jese Adams PA-C 200 mL/hr at 10/25/24 1720 1,000 mg at 10/25/24 1720    venlafaxine (EFFEXOR XR) 24 hr capsule 37.5 mg  37.5 mg Oral Daily Jese Adams PA-C   37.5 mg at 10/25/24 1006             Consultations:   Tele-ID consult, Tele-ICU and phone discussion with transplant service           Brief History of Illness from time of admission:     From Admission H+P:   Aubrey Duncan is a 69 year old male with past medical history of bilateral lung transplant secondary to alpha-1-antitrypsin deficiency on immunosuppression, severe peripheral arterial disease, s/p bilateral below knee amputation, CAD s/p stenting, hypertension, hyperlipidemia, chronic anemia, chronic kidney disease, and recurrent CMV viremia who now presents on 10/24/2024 from rehabilitation center with lethargy x 2 days.      Aubrey is currently at the [facility] for rehab after left below the knee amputation secondary to critical limb ischemia. He was found to have extensive occlusion of both deep and superficial arteries in the left lower extremity. History difficulty to obtain from patient as is lethargic. He reportedly developed lethargy yesterday which had progressed today. Reported episode of vomiting yesterday. He does endorses abdominal pain however cannot provide details. He denies chest pain or shortness of breath. Unknown if having fevers, however febrile on presentation with rigors. Upon evaluation in the [place] emergency department he was found to be septic secondary to UTI with elevated lactate. CXR without evidence for infection. No respiratory distress. He was started on  ceftriaxone for urosepsis. He was transferred to [facility] as no beds were available elsewhere. Upon [facility] arrival he has new fever and tachycardic. He was started on broad spectrum antibiotic with vancomycin and piperacillin-tazobactam. Workup was expanded and CT chest/abdomen/pelvis was obtained.            TODAY:     Subjective:  Patient a bit more alert this afternoon but not markedly different.  No new concerns.  Blood pressures improved and currently in 100's systolic.    See prog note for further details.        /62 (BP Location: Left arm)   Pulse 85   Temp 97.7  F (36.5  C) (Axillary)   Resp 20   Wt 58.4 kg (128 lb 12 oz)   SpO2 91%   BMI 20.16 kg/m     Unchanged from prog note     Hospital Course:        Aubrey Duncan is a 69 year old male who presents on 10/24/2024 with lethargy. On evaluation he is septic with fever, tachycardia, and elevated lactic acid. CT chest/abdomen/pelvis showing possible pneumonia and urinalysis UTI. Recent left BKA with wound dehiscence and minimal purulent drainage, no obvious cellulitis. Unclear source of infection, however started on vancomycin and Zosyn with fluid bolus followed by maintenance.     Sepsis with septic shock - most likely due to pneumonia vs UTI    Doubt cellulitis of left stump  Encephalopathy due to sepsis   Complicated by immune suppression due to lung transplant as below    - Arrived to outside hospital febrile 102.8 (normal prior to transfer). Tachycardic 109 with max 122. EGK sinus rhythm. Leukopenic (2.0). Lactate 3.2 -> 3.1 -> 2.2. CRP elevated 296.29. Procalcitonin 16.11. UA with pyuria and bacteriuria. Started on ceftriaxone.  CT chest/abdomen/pelvis showing interval development of new hazy ill-defined pulmonary nodules involving the right upper lobe and right middle lobe having the appearance of an acute infectious/inflammatory etiology. Interval decrease in prior consolidation posterior right lower lobe with minimal residual  atelectasis remaining right lower lobe. Minimal atelectasis left lower lobe with chronic left basilar pleural fluid remaining stable. Gallbladder distension, no cholelithiasis, with mild extrahepatic biliary prominence without choledocholithiasis. LFTs and bilirubin WNL. Diffuse mild pain to palpation on abdominal exam on admission, but no pain/tenderness AM 10/25. Lungs with LLL crackles, remainder of lung fields clear. Left stump with wound dehiscence and minimal drainage but otherwise looked good AM 10/25. No significant erythema or warmth.  - outside UA showed 10-20 WBCs and trace leukocyte esterase - no apparent urine culture sent   - admitted on Vancomycin (pharmacy to dose) and piperacillin-tazobactam 4.5 g q6hrs  - LR 1 liter bolus followed by 125 ml/heart rate on admission   - Acetaminophen available for fever  - became hypotensive AM 10/25 as below.  Persisted despite initial 30cc/kg bolus.  Gave additional 1L bolus, see discussion of PICC/pressors below   - continued maintenance LR @ 100/hour - reassess 10/25  - transplant recommended adding minocycline given history of stenotrophomonas that grew from his wound culture from the right knee 8/25/24 - this however is non-formulary and would have to be sent up from the U - discussed with ID who agreed with pharmacy that substituting doxycycline would be reasonable - started on IV doxycycline 10/25.     -  ? 291   - procalcitonin 16   - lactic 2.2 ? 2.8 ? 3.7 ? 7.2 (gave additional 1L bolus) ? 5.3 - holding off on further fluid with patient having received > 5L already today and blood pressure now improved - repeating lactic at 2000   -ID tele-consult pending    - MRSA PCR pending  - Covid, RSV, influenza pending  - UA and urine culture reordered on admission and pending, but collected after being on antibiotics    - blood culture from 10/25 pending, collected after starting antibiotics     UPDATE - with lactic now significantly up this afternoon  tele-ICU disucssed case again with transplant team and they are recommending transfer to [facility] on IMC status given current condition - discussed with accepting medicine provider, working on transfer as soon as they are able to take him.      Hypotension - suspect due to septic shock vs acute adrenal insufficiency  On daily prednisone for transplant.    Treating shock as above.   - for possible adrenal insufficiency, gave hydrocortisone 100 mg x 1 then 50 mg every 6 hours starting AM 10/25.  Reassess 10/26  - continued home prednisone in the background even while on this per transplant recommendations.    - attempted to place PICC 10/25 with plan to start noerpinephrine prn - however PICC team was unable to place PICC line.  In the meantime patient's blood pressure improved to 100's systolic.  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 No hypoxia or shortness of breath. Lungs clear on auscultation. Managed PTA with prednisone 5 mg AM, prednisone 5 mg PM, tacrolimus 3 mg BID, dapsone 40 mg for PJP prophylaxis, montelukast 10 mg, azithromycin three times per week, Advair inhaler  - Continued prednisone BID, tacrolimus, dapsone, and montelukast.  - Duonebs PRN available  - discussed with transplant team from [facility] 10/25 - agreed with current plan as above.  Continue prednisone and tacrolimus.  They  agreed with continuing current cares here for now.    - recommended checking tacrolimus trough with AM labs 10/26, pending.         Essential hypertension    Chronic. Blood pressure elevated 169/76. Managed PTA with carvedilol 6.25 mg BID and furosemide 20 mg.   - Held furosemide and coreg given hypotension/shock as above - reassess 10/25        Coronary artery disease (CAD) s/p stenting  Dyslipidemia    Follows with cardiology, last seen 8/2024. CAD s/p PCI with MEGHNA x 1 to LAD (1/11/24) and PCI with MEGHNA x 1 to RCA (2/16/24). Managed PTA with DAPT (ASA 81 mg and clopidogrel 75 mg). He is to be on DAPT x 1 year followed by lifelong ASA 81 mg monotherapy. Also taking rosuvastatin 20 mg three times per week.  - Continued ASA and clopidogrel - reassess if hemoglobin/platelets continue to drop   - Continued rosuvastatin   - held coreg as above - reassess 10/25        Bilateral below knee amputation (BKA)  H/o bilateral chronic limb-threatening ischemia   Peripheral arterial disease    S/p bilateral BKA (left 9/25 and right 8/28). H/o significant PAD and noted to have DVT in setting of HIT and recurrence of DVT and in setting of PAD. PTA apixiban 2.5 mg BID.  - Continue apixiban BID  - WOC for left stump wounds        History of PE/DVTs  - continued home apixaban - reassess if hemoglobin/platelets continue to drop        History of diffuse b-cell lymphoma of esophagus  - diagnosed after GI bleed in 3/24.  Started on weekly rituximab x 4 doses in 4/2024.  Restaging PET after this showed good response with decreased hypermetabolism in distal duodenum.    - held off on further rituximab given plan for vascular surgery for non-healing foot ulcers.    - follows with Dr. Drake ([facility] oncology) and perhaps with a local oncologist as well.  Plan was for repeat PET scan in 3 months, which would be this month but I don't see this scheduled.  Recommend follow-up with oncology on discharge.          Pancytopenia, new  Suspect leukopenia  and thrombocytopenia are due to sepsis     WBC 2.0 ? 1.4     Hemoglobin 10.5 ? 9.2 - has chronic anemia as below    platelets 143 ? 123         Chronic anemia    Appears stable. Hemoglobin 10.5. Baseline hemoglobin 8.0 - 10.0.    following, baseline so far         Chronic kidney disease state 3b    Appears stable. Creatine 0.82 and GFR >90. Baseline creatine near 0.90 - 1.0.  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 BG 78. Last hemoglobin A1c 4.2% 8/2024. Managed PTA with prandial insulin aspart 5 U with breakfast and 3 U with lunch/dinner. Basal insulin glargine 18 U in AM.  - Continue basal insulin glargine 18 U in AM  - Continue prandial insulin aspart 5 U with breakfast and 3 U with lunch/dinner  - Medium resistance SSI  - may need increased insulin after starting high dose steroids 10/25 - following        History of Recurrent CMV viremia     Managed PTA with acyclovir 400 mg BID.  - Continue acyclovir BID        Hypokalemia   Due to IVF/poor intake - started on RN replacement protocol 10/25        Hypomagnesemia    Magnesium 1.5.  - Replace with RN driven protocol        Hyponatremia  Mild, following with IVF as above         Gastroesophageal reflux disease (GERD)    Chronic. Managed PTA with pantoprazole 40 mg  - Continue pantoprazole         Clinically Significant Risk Factors Present on Admission     # Drug Induced Coagulation Defect: home medication list includes an anticoagulant medication  # Drug Induced Platelet Defect: home medication list includes an antiplatelet medication   # Hypertension: Noted on problem list    # Anemia: based on hgb <11  # Financial/Environmental Concerns:         Naranjo Catheter: Not present  Code Status: Full Code    Lines: PICC placed for possible pressors 10/25                    Discharge Disposition:     Transferred to [facility]      Attestation:  Additional 90 minutes spent in patient management and in discussing case  with Tele-ICU, medicine team and ID this afternoon beyond the time already documented in earlier progress note.    Tai Zelaya MD

## 2024-10-25 NOTE — DISCHARGE INSTRUCTIONS
L BKA wound(s): Daily and as needed  Clean with Vashe soak (Vashe on gauze 5-10 minutes). Then wipe wound/surrounding skin with Vashe/gauze and pat dry.  Apply zinc barrier paste (like Desitin) to skin around wound.  Apply thin layer of hydrogel to open wound.  Cover with piece of Xeroform cut to fit.  Cover with gauze or ABD pad and secure with rolled gauze, tape.    Follow up with Dr. Sneed's team as scheduled.

## 2024-10-25 NOTE — PLAN OF CARE
Goal Outcome Evaluation:                 [facility]

## 2024-10-25 NOTE — PROGRESS NOTES
Transfer Type: [facility]  Transfer Triage Note    Date of call: 10/25/24  Time of call: 6:54 PM    Current Patient Location:  [facility]  Current Level of Care: ICU    Vitals: Temp: 97.7  F (36.5  C) Temp src: Axillary BP: 106/60 Pulse: 83   Resp: 16 SpO2: 100 %   Weight: 58.4 kg (128 lb 12 oz)  O2 Device: Nasal cannula at Oxygen Delivery: 2 LPM  Diagnosis: Sepsis  Reason for requested transfer: Further diagnostic work up, management, and consultation for specialized care   Isolation Needs: None    Care everywhere has been updated and reviewed: Yes  Necessary images have been sent through PACS: Yes    If patient is transferring for specialty care or specific procedure, the specialist required has participated in the transfer call and agreed with need for transfer and anticipated timeline: No though per my conversation with tele-ICU Dr Fraga, transplant pulm team aware patient transferring    Transfer accepted: Yes  Stability of Patient: Patient is at risk for instability, however patient requires urgent transfer and does not meet ICU criteria   Is the patient [facility] for [facility]? No, What specific [place] needs are anticipated? 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 Workup notable for +UA (no cx sent from OSH?) and CT chest w/ contrast noted RLL consolidation as well as scattered RUL/RML nodules. CT abd mild GB distension and unchanged panc cyst and thickened bladder wall. Bcx  pending. Suspect UTI/PNA source though per notes, also wound dehis L BKA minimal drainage. BP improved with aggressive IVF resuscitation, currently on stress dose steroids as well. Unable to obtain central access at OSH; no pressors at this time. On vanc and zosyn; doxy added per ID given stenotrophomonas history. Bcx pending.  Given transplant/complexity, agree with transfer to [facility]. Noted acidosis and persistently elevated lactate (3-->7-->5). Case discussed with tele-ICU Dr Fraga as well. If lactate improving and BP stable could be possibility for med-surg with tele though if still rising, would reconsider for ICU bed    Nicole Liu MD

## 2024-10-25 NOTE — PROGRESS NOTES
[facility]    Hospital Medicine   Cross Cover Note  Date of Service: 10/25/2024     I was called due to hypotension and elevated lactic acid.  Patient already known to be septic and is on appropriate antibiotics.   However, is on chronic prednisone and may be experiencing adrenal crisis.     Plan:  - 30 ml/kg LR bolus for sepsis  - Stress dose steroids with hydrocortisone, hold prednisone      Miranda Driver PA-C  [facility] Hospitalist Service

## 2024-10-25 NOTE — MEDICATION SCRIBE - ADMISSION MEDICATION HISTORY
Medication Scribe Admission Medication History    Admission medication history is complete. The information provided in this note is only as accurate as the sources available at the time of the update.    Information Source(s): Facility ([facility]/) medication list/MAR via phone    [facility]ert[facility]    Changes made to PTA medication list:  Added: None  Deleted: milk of magnesia  Changed: None    Allergies reviewed with patient and updates made in EHR: yes    Medication History Completed By: Chloé Kinney 10/25/2024 2:12 PM    PTA Med List   Medication Sig Last Dose/Taking    acetaminophen (TYLENOL) 325 MG tablet Take 2 tablets (650 mg) by mouth every 6 hours as needed for mild pain 10/23/2024 Evening    acyclovir (ZOVIRAX) 400 MG tablet TAKE 1 TABLET (400 MG) BY MOUTH 2 TIMES DAILY 10/24/2024 Morning    albuterol (PROAIR HFA/PROVENTIL HFA/VENTOLIN HFA) 108 (90 Base) MCG/ACT inhaler Inhale 1-2 puffs into the lungs every 6 hours as needed for shortness of breath or wheezing More than a month    apixaban ANTICOAGULANT (ELIQUIS) 5 MG tablet Take 1 tablet (5 mg) by mouth 2 times daily. 10/24/2024 Morning    aspirin (ASA) 81 MG EC tablet Take 1 tablet (81 mg) by mouth daily 10/23/2024    azithromycin (ZITHROMAX) 250 MG tablet Take 250 mg by mouth Every Mon, Wed, Fri Morning 10/23/2024 Morning    bisacodyl (DULCOLAX) 10 MG suppository Place 1 suppository (10 mg) rectally daily as needed for constipation (Use if magnesium hydroxide (MILK of MAGNESIA) is not effective after 24 hours. May discontinue if patient having bowel movement.). More than a month    Calcium Carbonate-Vitamin D 600-10 MG-MCG TABS Take 1 tablet by mouth 2 times daily (with meals) 10/24/2024 Morning    carvedilol (COREG) 6.25 MG tablet TAKE 1 TABLET (6.25 MG) BY MOUTH 2 TIMES DAILY (WITH MEALS) 10/24/2024 Morning    dapsone (ACZONE) 25 MG tablet Take 2 tablets (50 mg) by mouth daily 10/23/2024    diclofenac (VOLTAREN) 1 %  topical gel Apply 2 g topically 2 times daily as needed for moderate pain More than a month    Ferrous Sulfate Dried (HIGH POTENCY IRON) 65 MG TABS Take 1 tablet by mouth every morning. 10/24/2024 Morning    fludrocortisone (FLORINEF) 0.1 MG tablet Take 1 tablet (0.1 mg) by mouth daily 10/24/2024 Morning    fluticasone-salmeterol (ADVAIR-HFA) 230-21 MCG/ACT inhaler Inhale 2 puffs into the lungs 2 times daily 10/24/2024 Morning    furosemide (LASIX) 20 MG tablet Take 1 tablet (20 mg) by mouth daily 10/24/2024 Morning    HYDROmorphone (DILAUDID) 2 MG tablet Take 0.5 tablets (1 mg) by mouth every 6 hours as needed for severe pain. Past Week    insulin aspart (NOVOLOG FLEXPEN) 100 UNIT/ML pen Inject 5 Units subcutaneously daily (with breakfast). Do not give if blood sugar < 70 mg/dL. 10/24/2024 Morning    insulin aspart (NOVOLOG PEN) 100 UNIT/ML pen Inject 3 Units subcutaneously 2 times daily (with meals). Lunch & supper 10/23/2024 at  4:30 PM    insulin glargine (LANTUS PEN) 100 UNIT/ML pen Inject 18 Units Subcutaneous every morning (before breakfast) 10/24/2024 Morning    loperamide (IMODIUM) 2 MG capsule Take 1 capsule (2 mg) by mouth 4 times daily as needed for diarrhea Past Week    magnesium gluconate (MAGONATE) 500 MG tablet Take 1 tablet (500 mg) by mouth at bedtime. 10/24/2024 Morning    melatonin 1 MG TABS tablet Take 1 tablet (1 mg) by mouth nightly as needed for sleep. Past Month    metoclopramide (REGLAN) 5 MG tablet Take 1 tablet (5 mg) by mouth every 6 hours as needed (nausea) 10/24/2024 at  3:00 AM    montelukast (SINGULAIR) 10 MG tablet Take 1 tablet (10 mg) by mouth every evening 10/23/2024 Evening    multivitamin, therapeutic (THERA-VIT) TABS Take 1 tablet by mouth daily 10/24/2024 Morning    pantoprazole (PROTONIX) 40 MG EC tablet Take 1 tablet (40 mg) by mouth daily 10/24/2024 Morning    predniSONE (DELTASONE) 5 MG tablet Take 1 tablet by mouth every morning. 10/24/2024 Morning    predniSONE  (DELTASONE) 5 MG tablet Take 0.5 tablets by mouth every evening. 10/23/2024 Evening    psyllium (METAMUCIL/KONSYL) capsule Take 2 capsules by mouth 2 times daily. 10/24/2024 Morning    rosuvastatin (CRESTOR) 40 MG tablet Take 20 mg by mouth Every Mon, Wed, Fri Morning 10/23/2024 Morning    senna-docusate (SENOKOT-S/PERICOLACE) 8.6-50 MG tablet Take 1 tablet by mouth 2 times daily. Past Month    tacrolimus (GENERIC EQUIVALENT) 1 MG capsule Take 3 capsules (3 mg) by mouth 2 times daily. Total dose: 3 mg in AM and 3 mg in PM 10/24/2024 Morning    thiamine (B-1) 100 MG tablet Take 1 tablet (100 mg) by mouth daily. 10/24/2024 Evening    traMADol 25 MG TABS tablet Take 1 tablet (25 mg) by mouth every 4 hours as needed for moderate pain. Past Week    venlafaxine (EFFEXOR XR) 37.5 MG 24 hr capsule Take 37.5 mg by mouth daily. 10/24/2024 Morning
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
## 2024-10-25 NOTE — PROGRESS NOTES
[facility]  Procedure Note         PICC      Aubrey Duncan  MRN# [id number]   October 25, 2024, 12:17 PM Indication: Fluids, electrolyte and nutrition administration  Hypotension  Laboratory sampling  Medication administration           Pause for the cause: Consent for catheter placement procedure signed  Time out completed  Patient ID's verified using two distinct indicators  All necessary equipment is present  Site marked if extremity to be used has been predetermined   Type of line to be used: PICC   Full barrier precautions used: Yes   Skin preparation: Chlorehexidine Gluconate 25% with isopropyl alcohol 70%   Date of insertion: October 25, 2024, 11:17 AM   Device type: Double lumen, valved, 5.0   Catheter brand: ItrybeforeIbuy   Lot number: [id number]   Insertion location: Left basilic vein   Method of placement: Venipuncture  MST  Ultrasound   Number of attempts: With ultrasound: 1   Without ultrasound: 0   Difficulty threading: Yes (see summary)  IR Referral   Midline IV device: NA   Arm circumference: NA   Midline extremity circumference: 25 cm   Internal length: 0 cm   Midline visible catheter length: 0 cm   Total catheter length: 0 cm   Tip termination: Unable   Method of verification: Not applicable   Midline patency post placement: NA   Line flush: Line flush documented on the eMARyes   Placement verified by: ENID   Catheter placed by: Kylie Huynh RN   Discontinuation form initiated: No   Patient tolerance: Complained of discomfort  Intradermal injection   PICC Insertion Education Complete: Yes      Summary:  This procedure was performed with difficulty and he tolerated the procedure fairly well with no  noted immediate complications.     Attempted placement on Left Basilic vein first.  Pt has had numerous Picc lines placed successfully.  Bilateral arms are covered with bruises.  Access gained on first attempt, but wire would not pass beyond left shoulder.  Accessed the Brachial, Basilic and  Cephalic veins on the left arm and nothing would thread beyond shoulder.  A whole new set-up was done for the Right side.  Accessed Basilic vein successfully and wire passed, but catheter would not thread beyond Right Clavicle.  One last access into the Right Cephalic vein was successful, but again, picc would not thread, although wire went smoothly.  This patient has bilateral lung transplants.  MD updated as to status.  Patient has 2 working PIVs at this point.  MD is deferring internal jugular due to Eloquis therapy.       Recorded by Kylie Huynh RN    Attestation:  Amount of time performed on this procedure: 60 minutes.

## 2024-10-25 NOTE — PROGRESS NOTES
End Of Shift Note    Situation: Transfer to higher level of care ordered.  LR infusion discontinued, Sodium biocarb initiated at 100 mL/ hr.      Plan: Infectious MD tele path into patient room to discuss with family plan of care which includes ordered antibiotics.  Intensivist MD Fraga would like patient transferred to [place] in [facility] care.  Lactic Acid 7.2, LR bolus administered with MLR infusion which intensivist discontinued.  Recheck lactic acid 5.3 with an upate to MD Bee, continue with current plan to transfer to [facility].    Subjective/Objective:    Neuro: GCS fluctuates throughout day 11-13, once he awakes he is able to drink/ take medications and eat.     Cardiac: SR    Resp: started shift at [facility], due to apnea episodes while asleep patient initiated 2LPNC to keep 02 sats above 90%.     GI/: Male external purewick placed with 450 mL straw color urine, sample sent to lab.  Continent LBM this am.    Endo: , 121, 110    MSK: BLE amputee with WOCN RN visit today, wound care orders placed.    Skin: numerous scattered bruises, open wound LLE stump.    LDAs: Two 20 G PIV's, unable to place PICC line this shift, MD Zelaya consider internal jugular if SBP less than 90.  SBP greater than 100 x 5 readings.
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
## 2024-10-25 NOTE — PHARMACY-VANCOMYCIN DOSING SERVICE
"Pharmacy Vancomycin Initial Note  Date of Service 2024  Patient's  1955  69 year old, male    Indication: Sepsis    Current estimated CrCl = Estimated Creatinine Clearance: 83.5 mL/min (based on SCr of 0.67 mg/dL).    Creatinine for last 3 days  No results found for requested labs within last 3 days.    Recent Vancomycin Level(s) for last 3 days  No results found for requested labs within last 3 days.      Vancomycin IV Administrations (past 72 hours)        No vancomycin orders with administrations in past 72 hours.                    Nephrotoxins and other renal medications (From now, onward)      Start     Dose/Rate Route Frequency Ordered Stop    10/25/24 0000  piperacillin-tazobactam (ZOSYN) 3.375 g vial to attach to  mL bag        Note to Pharmacy: For [facility], [facility] and [facility]: For Zosyn-naive patients, use the \"Zosyn initial dose + extended infusion\" order panel.    3.375 g  over 30 Minutes Intravenous EVERY 6 HOURS 10/24/24 2344              Contrast Orders - past 72 hours (72h ago, onward)      None            [employer] Prediction of Planned Initial Vancomycin Regimen  Loading dose: 1500 mg at 00:00 10/25/2024.  Regimen: 1000 mg IV every 12 hours.  Start time: 12:00 on 10/02/2024  Exposure target: AUC24 (range)400-600 mg/L.hr   AUC24,ss: 587 mg/L.hr  Probability of AUC24 > 400: 85 %  Ctrough,ss: 18 mg/L  Probability of Ctrough,ss > 20: 42 %  Probability of nephrotoxicity (Lodise SEYMOUR ): 14 %          Plan:  Start vancomycin  1500 mg IV load then 1000mg IV  q12h.   Vancomycin monitoring method: AUC  Vancomycin therapeutic monitoring goal: 400-600 mg*h/L  Pharmacy will check vancomycin levels as appropriate in 1-3 Days.    Serum creatinine levels will be ordered daily for the first week of therapy and at least twice weekly for subsequent weeks.      Yessenia Cunningham [facility] [place]   "

## 2024-10-25 NOTE — PHARMACY-VANCOMYCIN DOSING SERVICE
Pharmacy Vancomycin Note  Date of Service 2024  Patient's  1955   69 year old, male    Indication: Sepsis  Day of Therapy: 1  Current vancomycin regimen: 1000 mg IV q12h  Current vancomycin monitoring method: AUC  Current vancomycin therapeutic monitoring goal: 400-600 mg*h/L    [employer] Prediction of Current Vancomycin Regimen    Loading dose: 1500 mg IV once.  Regimen: 1000 mg IV every 12 hours.  Start time: 12:00 on 10/25/2024  Exposure target: AUC24 (range)400-600 mg/L.hr   AUC24,ss: 712 mg/L.hr  Probability of AUC24 > 400: 95 %  Ctrough,ss: 23.1 mg/L  Probability of Ctrough,ss > 20: 62 %  Probability of nephrotoxicity (Lodise SEYMOUR ): 23 %      Current estimated CrCl = Estimated Creatinine Clearance: 64.3 mL/min (based on SCr of 0.87 mg/dL).    Creatinine for last 3 days  10/25/2024: 12:24 AM Creatinine 0.82 mg/dL;  5:54 AM Creatinine 0.87 mg/dL    Recent Vancomycin Levels (past 3 days)  No results found for requested labs within last 3 days.    Vancomycin IV Administrations (past 72 hours)                     vancomycin (VANCOCIN) 1,500 mg in 0.9% NaCl 265 mL intermittent infusion (mg) 1,500 mg New Bag 10/25/24 0253                    Nephrotoxins and other renal medications (From now, onward)      Start     Dose/Rate Route Frequency Ordered Stop    10/25/24 1200  vancomycin (VANCOCIN) 1,000 mg in 200 mL dextrose intermittent infusion         1,000 mg  200 mL/hr over 1 Hours Intravenous EVERY 12 HOURS 10/24/24 2351      10/25/24 0800  [Held by provider]  furosemide (LASIX) tablet 20 mg        (On hold since today at 0056 until manually unheld; held by Jese Adams PA-CHold Reason: Change in Vitals)   Note to Pharmacy: PTA Sig:Take 1 tablet (20 mg) by mouth daily      20 mg Oral DAILY 10/25/24 0056      10/25/24 0130  acyclovir (ZOVIRAX) capsule 400 mg        Note to Pharmacy: PTA Sig:TAKE 1 TABLET (400 MG) BY MOUTH 2 TIMES DAILY      400 mg Oral 2 TIMES DAILY 10/25/24 0056       "10/25/24 0100  tacrolimus (GENERIC EQUIVALENT) capsule 3 mg        Note to Pharmacy: PTA Sig:Take 3 capsules (3 mg) by mouth 2 times daily. Total dose: 3 mg in AM and 3 mg in PM      3 mg Oral 2 TIMES DAILY 10/25/24 0056      10/25/24 0000  piperacillin-tazobactam (ZOSYN) 4.5 g vial to attach to  mL bag        Note to Pharmacy: For [facility], [facility] and [facility]: For Zosyn-naive patients, use the \"Zosyn initial dose + extended infusion\" order panel.    4.5 g  over 30 Minutes Intravenous EVERY 6 HOURS 10/24/24 2344                 Contrast Orders - past 72 hours (72h ago, onward)      Start     Dose/Rate Route Frequency Stop    10/25/24 0130  iopamidol (ISOVUE-370) solution 63 mL  Status:  Discontinued         63 mL Intravenous ONCE 10/25/24 0101    10/25/24 0130  iopamidol (ISOVUE-370) solution 63 mL         63 mL Intravenous ONCE 10/25/24 0211            Interpretation of current regimen:    Has serum creatinine changed greater than 50% in last 72 hours: No    Insight[employer] Prediction of Planned New Vancomycin Regimen    Regimen: 1000 mg IV every 18 hours.  Start time: 16:00 on 10/25/2024  Exposure target: AUC24 (range)400-600 mg/L.hr   AUC24,ss: 480 mg/L.hr  Probability of AUC24 > 400: 70 %  Ctrough,ss: 14.1 mg/L  Probability of Ctrough,ss > 20: 22 %  Probability of nephrotoxicity (Lodise SEYMOUR 2009): 9 %      Plan:  Based on updated serum creatinine information, plan for the vancomycin regimen to change to 1000 mg IV every 18 hours to help achieve an AUC in the goal range.  Vancomycin monitoring method: AUC  Vancomycin therapeutic monitoring goal: 400-600 mg*h/L  Pharmacy will check vancomycin levels as appropriate in 1-3 Days or when clinically appropriate.  Serum creatinine levels will be ordered a minimum of twice weekly.    Jan Lamas [facility]   "

## 2024-10-26 ENCOUNTER — HOSPITAL ENCOUNTER (INPATIENT)
Facility: CLINIC | Age: 69
LOS: 10 days | Discharge: NURSING, REHAB OR LONG-TERM CARE | DRG: 871 | End: 2024-11-05
Attending: STUDENT IN AN ORGANIZED HEALTH CARE EDUCATION/TRAINING PROGRAM | Admitting: INTERNAL MEDICINE
Payer: MEDICARE

## 2024-10-26 VITALS
RESPIRATION RATE: 25 BRPM | WEIGHT: 141.09 LBS | TEMPERATURE: 97.1 F | HEART RATE: 92 BPM | BODY MASS INDEX: 22.1 KG/M2 | SYSTOLIC BLOOD PRESSURE: 108 MMHG | DIASTOLIC BLOOD PRESSURE: 62 MMHG | OXYGEN SATURATION: 94 %

## 2024-10-26 DIAGNOSIS — D47.Z1 PTLD (POST-TRANSPLANT LYMPHOPROLIFERATIVE DISORDER) (H): ICD-10-CM

## 2024-10-26 DIAGNOSIS — I50.21 ACUTE SYSTOLIC CONGESTIVE HEART FAILURE (H): ICD-10-CM

## 2024-10-26 DIAGNOSIS — E11.9 DIABETES MELLITUS TYPE 2, DIET-CONTROLLED (H): ICD-10-CM

## 2024-10-26 DIAGNOSIS — Z95.5 S/P CORONARY ARTERY STENT PLACEMENT: ICD-10-CM

## 2024-10-26 DIAGNOSIS — K59.03 DRUG-INDUCED CONSTIPATION: ICD-10-CM

## 2024-10-26 DIAGNOSIS — N30.00 ACUTE CYSTITIS WITHOUT HEMATURIA: ICD-10-CM

## 2024-10-26 DIAGNOSIS — Z94.2 S/P LUNG TRANSPLANT (H): Chronic | ICD-10-CM

## 2024-10-26 DIAGNOSIS — J44.9 CHRONIC OBSTRUCTIVE PULMONARY DISEASE, UNSPECIFIED COPD TYPE (H): ICD-10-CM

## 2024-10-26 DIAGNOSIS — I25.10 ATHEROSCLEROSIS OF CORONARY ARTERY OF NATIVE HEART WITHOUT ANGINA PECTORIS, UNSPECIFIED VESSEL OR LESION TYPE: Primary | ICD-10-CM

## 2024-10-26 DIAGNOSIS — E46 MALNUTRITION, UNSPECIFIED TYPE (H): ICD-10-CM

## 2024-10-26 DIAGNOSIS — D84.9 IMMUNOCOMPROMISED STATE (H): ICD-10-CM

## 2024-10-26 DIAGNOSIS — E83.42 HYPOMAGNESEMIA: ICD-10-CM

## 2024-10-26 LAB
ALBUMIN SERPL BCG-MCNC: 2.2 G/DL (ref 3.5–5.2)
ALBUMIN SERPL BCG-MCNC: 2.4 G/DL (ref 3.5–5.2)
ALP SERPL-CCNC: 104 U/L (ref 40–150)
ALP SERPL-CCNC: 117 U/L (ref 40–150)
ALT SERPL W P-5'-P-CCNC: 29 U/L (ref 0–70)
ALT SERPL W P-5'-P-CCNC: 33 U/L (ref 0–70)
ANION GAP SERPL CALCULATED.3IONS-SCNC: 15 MMOL/L (ref 7–15)
ANION GAP SERPL CALCULATED.3IONS-SCNC: 15 MMOL/L (ref 7–15)
AST SERPL W P-5'-P-CCNC: 47 U/L (ref 0–45)
AST SERPL W P-5'-P-CCNC: 47 U/L (ref 0–45)
BASE EXCESS BLDV CALC-SCNC: 0.2 MMOL/L (ref -3–3)
BASOPHILS # BLD AUTO: 0 10E3/UL (ref 0–0.2)
BASOPHILS NFR BLD AUTO: 1 %
BILIRUB SERPL-MCNC: 0.4 MG/DL
BILIRUB SERPL-MCNC: 0.4 MG/DL
BUN SERPL-MCNC: 22.1 MG/DL (ref 8–23)
BUN SERPL-MCNC: 30 MG/DL (ref 8–23)
C PNEUM DNA SPEC QL NAA+PROBE: NOT DETECTED
CALCIUM SERPL-MCNC: 7.5 MG/DL (ref 8.8–10.4)
CALCIUM SERPL-MCNC: 7.6 MG/DL (ref 8.8–10.4)
CHLORIDE SERPL-SCNC: 102 MMOL/L (ref 98–107)
CHLORIDE SERPL-SCNC: 104 MMOL/L (ref 98–107)
CMV DNA SPEC NAA+PROBE-ACNC: NOT DETECTED IU/ML
CREAT SERPL-MCNC: 0.79 MG/DL (ref 0.67–1.17)
CREAT SERPL-MCNC: 1.02 MG/DL (ref 0.67–1.17)
CRP SERPL-MCNC: 391.41 MG/L
EGFRCR SERPLBLD CKD-EPI 2021: 79 ML/MIN/1.73M2
EGFRCR SERPLBLD CKD-EPI 2021: >90 ML/MIN/1.73M2
EOSINOPHIL # BLD AUTO: 0 10E3/UL (ref 0–0.7)
EOSINOPHIL NFR BLD AUTO: 0 %
ERYTHROCYTE [DISTWIDTH] IN BLOOD BY AUTOMATED COUNT: 16.2 % (ref 10–15)
ERYTHROCYTE [DISTWIDTH] IN BLOOD BY AUTOMATED COUNT: 16.3 % (ref 10–15)
FLUAV H1 2009 PAND RNA SPEC QL NAA+PROBE: NOT DETECTED
FLUAV H1 RNA SPEC QL NAA+PROBE: NOT DETECTED
FLUAV H3 RNA SPEC QL NAA+PROBE: NOT DETECTED
FLUAV RNA SPEC QL NAA+PROBE: NOT DETECTED
FLUBV RNA SPEC QL NAA+PROBE: NOT DETECTED
GLUCOSE BLDC GLUCOMTR-MCNC: 144 MG/DL (ref 70–99)
GLUCOSE BLDC GLUCOMTR-MCNC: 153 MG/DL (ref 70–99)
GLUCOSE BLDC GLUCOMTR-MCNC: 172 MG/DL (ref 70–99)
GLUCOSE BLDC GLUCOMTR-MCNC: 184 MG/DL (ref 70–99)
GLUCOSE BLDC GLUCOMTR-MCNC: 196 MG/DL (ref 70–99)
GLUCOSE BLDC GLUCOMTR-MCNC: 97 MG/DL (ref 70–99)
GLUCOSE SERPL-MCNC: 151 MG/DL (ref 70–99)
GLUCOSE SERPL-MCNC: 198 MG/DL (ref 70–99)
HADV DNA SPEC QL NAA+PROBE: NOT DETECTED
HCO3 BLDV-SCNC: 24 MMOL/L (ref 21–28)
HCO3 SERPL-SCNC: 21 MMOL/L (ref 22–29)
HCO3 SERPL-SCNC: 21 MMOL/L (ref 22–29)
HCOV PNL SPEC NAA+PROBE: NOT DETECTED
HCT VFR BLD AUTO: 27.8 % (ref 40–53)
HCT VFR BLD AUTO: 29.6 % (ref 40–53)
HGB BLD-MCNC: 9.1 G/DL (ref 13.3–17.7)
HGB BLD-MCNC: 9.2 G/DL (ref 13.3–17.7)
HMPV RNA SPEC QL NAA+PROBE: NOT DETECTED
HOLD SPECIMEN: NORMAL
HPIV1 RNA SPEC QL NAA+PROBE: NOT DETECTED
HPIV2 RNA SPEC QL NAA+PROBE: NOT DETECTED
HPIV3 RNA SPEC QL NAA+PROBE: NOT DETECTED
HPIV4 RNA SPEC QL NAA+PROBE: NOT DETECTED
IMM GRANULOCYTES # BLD: 0.2 10E3/UL
IMM GRANULOCYTES NFR BLD: 5 %
LACTATE SERPL-SCNC: 4.2 MMOL/L (ref 0.7–2)
LACTATE SERPL-SCNC: 4.9 MMOL/L (ref 0.7–2)
LACTATE SERPL-SCNC: 5.3 MMOL/L (ref 0.7–2)
LYMPHOCYTES # BLD AUTO: 0.3 10E3/UL (ref 0.8–5.3)
LYMPHOCYTES NFR BLD AUTO: 6 %
M PNEUMO DNA SPEC QL NAA+PROBE: NOT DETECTED
MAGNESIUM SERPL-MCNC: 1.4 MG/DL (ref 1.7–2.3)
MAGNESIUM SERPL-MCNC: 1.4 MG/DL (ref 1.7–2.3)
MAGNESIUM SERPL-MCNC: 2.7 MG/DL (ref 1.7–2.3)
MCH RBC QN AUTO: 30.9 PG (ref 26.5–33)
MCH RBC QN AUTO: 31.7 PG (ref 26.5–33)
MCHC RBC AUTO-ENTMCNC: 31.1 G/DL (ref 31.5–36.5)
MCHC RBC AUTO-ENTMCNC: 32.7 G/DL (ref 31.5–36.5)
MCV RBC AUTO: 97 FL (ref 78–100)
MCV RBC AUTO: 99 FL (ref 78–100)
MONOCYTES # BLD AUTO: 0.5 10E3/UL (ref 0–1.3)
MONOCYTES NFR BLD AUTO: 14 %
NEUTROPHILS # BLD AUTO: 2.9 10E3/UL (ref 1.6–8.3)
NEUTROPHILS NFR BLD AUTO: 74 %
NRBC # BLD AUTO: 0 10E3/UL
NRBC BLD AUTO-RTO: 0 /100
O2/TOTAL GAS SETTING VFR VENT: 0 %
OXYHGB MFR BLDV: 83 % (ref 70–75)
PCO2 BLDV: 33 MM HG (ref 40–50)
PH BLDV: 7.46 [PH] (ref 7.32–7.43)
PHOSPHATE SERPL-MCNC: 3.7 MG/DL (ref 2.5–4.5)
PLATELET # BLD AUTO: 125 10E3/UL (ref 150–450)
PLATELET # BLD AUTO: 147 10E3/UL (ref 150–450)
PO2 BLDV: 48 MM HG (ref 25–47)
POTASSIUM SERPL-SCNC: 3.2 MMOL/L (ref 3.4–5.3)
POTASSIUM SERPL-SCNC: 3.8 MMOL/L (ref 3.4–5.3)
PROCALCITONIN SERPL IA-MCNC: 37.6 NG/ML
PROT SERPL-MCNC: 4.5 G/DL (ref 6.4–8.3)
PROT SERPL-MCNC: 4.8 G/DL (ref 6.4–8.3)
RBC # BLD AUTO: 2.87 10E6/UL (ref 4.4–5.9)
RBC # BLD AUTO: 2.98 10E6/UL (ref 4.4–5.9)
RSV RNA SPEC QL NAA+PROBE: NOT DETECTED
RSV RNA SPEC QL NAA+PROBE: NOT DETECTED
RV+EV RNA SPEC QL NAA+PROBE: NOT DETECTED
SAO2 % BLDV: 84.9 % (ref 70–75)
SODIUM SERPL-SCNC: 138 MMOL/L (ref 135–145)
SODIUM SERPL-SCNC: 140 MMOL/L (ref 135–145)
TACROLIMUS BLD-MCNC: 39.8 UG/L (ref 5–15)
TME LAST DOSE: ABNORMAL H
TME LAST DOSE: ABNORMAL H
VANCOMYCIN SERPL-MCNC: 22.1 UG/ML
WBC # BLD AUTO: 3.2 10E3/UL (ref 4–11)
WBC # BLD AUTO: 3.9 10E3/UL (ref 4–11)

## 2024-10-26 PROCEDURE — 87581 M.PNEUMON DNA AMP PROBE: CPT

## 2024-10-26 PROCEDURE — 250N000011 HC RX IP 250 OP 636: Performed by: FAMILY MEDICINE

## 2024-10-26 PROCEDURE — 87385 HISTOPLASMA CAPSUL AG IA: CPT | Performed by: FAMILY MEDICINE

## 2024-10-26 PROCEDURE — 250N000013 HC RX MED GY IP 250 OP 250 PS 637: Performed by: FAMILY MEDICINE

## 2024-10-26 PROCEDURE — 86140 C-REACTIVE PROTEIN: CPT | Performed by: FAMILY MEDICINE

## 2024-10-26 PROCEDURE — 258N000003 HC RX IP 258 OP 636

## 2024-10-26 PROCEDURE — 85025 COMPLETE CBC W/AUTO DIFF WBC: CPT | Performed by: FAMILY MEDICINE

## 2024-10-26 PROCEDURE — 83735 ASSAY OF MAGNESIUM: CPT

## 2024-10-26 PROCEDURE — 36415 COLL VENOUS BLD VENIPUNCTURE: CPT | Performed by: STUDENT IN AN ORGANIZED HEALTH CARE EDUCATION/TRAINING PROGRAM

## 2024-10-26 PROCEDURE — 84100 ASSAY OF PHOSPHORUS: CPT | Performed by: FAMILY MEDICINE

## 2024-10-26 PROCEDURE — 250N000012 HC RX MED GY IP 250 OP 636 PS 637

## 2024-10-26 PROCEDURE — 85027 COMPLETE CBC AUTOMATED: CPT

## 2024-10-26 PROCEDURE — 36415 COLL VENOUS BLD VENIPUNCTURE: CPT

## 2024-10-26 PROCEDURE — 99418 PROLNG IP/OBS E/M EA 15 MIN: CPT | Performed by: FAMILY MEDICINE

## 2024-10-26 PROCEDURE — 250N000009 HC RX 250: Performed by: INTERNAL MEDICINE

## 2024-10-26 PROCEDURE — 80202 ASSAY OF VANCOMYCIN: CPT | Performed by: STUDENT IN AN ORGANIZED HEALTH CARE EDUCATION/TRAINING PROGRAM

## 2024-10-26 PROCEDURE — 99207 PR APP CREDIT; MD BILLING SHARED VISIT: CPT | Mod: GC | Performed by: INTERNAL MEDICINE

## 2024-10-26 PROCEDURE — 94640 AIRWAY INHALATION TREATMENT: CPT | Mod: 76

## 2024-10-26 PROCEDURE — 258N000003 HC RX IP 258 OP 636: Performed by: INTERNAL MEDICINE

## 2024-10-26 PROCEDURE — 94640 AIRWAY INHALATION TREATMENT: CPT

## 2024-10-26 PROCEDURE — 83605 ASSAY OF LACTIC ACID: CPT

## 2024-10-26 PROCEDURE — 99233 SBSQ HOSP IP/OBS HIGH 50: CPT | Performed by: FAMILY MEDICINE

## 2024-10-26 PROCEDURE — 120N000003 HC R&B IMCU UMMC

## 2024-10-26 PROCEDURE — 250N000013 HC RX MED GY IP 250 OP 250 PS 637

## 2024-10-26 PROCEDURE — 85060 BLOOD SMEAR INTERPRETATION: CPT | Performed by: STUDENT IN AN ORGANIZED HEALTH CARE EDUCATION/TRAINING PROGRAM

## 2024-10-26 PROCEDURE — 84295 ASSAY OF SERUM SODIUM: CPT

## 2024-10-26 PROCEDURE — 82040 ASSAY OF SERUM ALBUMIN: CPT | Performed by: FAMILY MEDICINE

## 2024-10-26 PROCEDURE — 84145 PROCALCITONIN (PCT): CPT | Performed by: FAMILY MEDICINE

## 2024-10-26 PROCEDURE — 80197 ASSAY OF TACROLIMUS: CPT | Performed by: FAMILY MEDICINE

## 2024-10-26 PROCEDURE — 250N000012 HC RX MED GY IP 250 OP 636 PS 637: Performed by: FAMILY MEDICINE

## 2024-10-26 PROCEDURE — 250N000011 HC RX IP 250 OP 636

## 2024-10-26 PROCEDURE — P9045 ALBUMIN (HUMAN), 5%, 250 ML: HCPCS

## 2024-10-26 PROCEDURE — 36415 COLL VENOUS BLD VENIPUNCTURE: CPT | Performed by: FAMILY MEDICINE

## 2024-10-26 PROCEDURE — 250N000009 HC RX 250: Performed by: FAMILY MEDICINE

## 2024-10-26 PROCEDURE — 250N000011 HC RX IP 250 OP 636: Mod: JZ

## 2024-10-26 PROCEDURE — 82805 BLOOD GASES W/O2 SATURATION: CPT | Performed by: FAMILY MEDICINE

## 2024-10-26 PROCEDURE — 87486 CHLMYD PNEUM DNA AMP PROBE: CPT

## 2024-10-26 PROCEDURE — 83605 ASSAY OF LACTIC ACID: CPT | Performed by: FAMILY MEDICINE

## 2024-10-26 PROCEDURE — 85045 AUTOMATED RETICULOCYTE COUNT: CPT

## 2024-10-26 PROCEDURE — 83735 ASSAY OF MAGNESIUM: CPT | Performed by: FAMILY MEDICINE

## 2024-10-26 PROCEDURE — 99207 PR APP CREDIT; MD BILLING SHARED VISIT: CPT | Performed by: PHYSICIAN ASSISTANT

## 2024-10-26 PROCEDURE — 84155 ASSAY OF PROTEIN SERUM: CPT

## 2024-10-26 PROCEDURE — 85007 BL SMEAR W/DIFF WBC COUNT: CPT

## 2024-10-26 RX ORDER — VENLAFAXINE HYDROCHLORIDE 37.5 MG/1
37.5 CAPSULE, EXTENDED RELEASE ORAL DAILY
Status: DISCONTINUED | OUTPATIENT
Start: 2024-10-27 | End: 2024-11-05 | Stop reason: HOSPADM

## 2024-10-26 RX ORDER — ACETAMINOPHEN 325 MG/1
650 TABLET ORAL EVERY 6 HOURS PRN
Status: DISCONTINUED | OUTPATIENT
Start: 2024-10-26 | End: 2024-10-26

## 2024-10-26 RX ORDER — ACETAMINOPHEN 325 MG/1
650 TABLET ORAL EVERY 4 HOURS PRN
Status: DISCONTINUED | OUTPATIENT
Start: 2024-10-26 | End: 2024-11-05 | Stop reason: HOSPADM

## 2024-10-26 RX ORDER — ONDANSETRON 4 MG/1
4 TABLET, ORALLY DISINTEGRATING ORAL EVERY 6 HOURS PRN
Status: CANCELLED | OUTPATIENT
Start: 2024-10-26

## 2024-10-26 RX ORDER — FLUTICASONE FUROATE AND VILANTEROL 200; 25 UG/1; UG/1
1 POWDER RESPIRATORY (INHALATION) DAILY
Status: DISCONTINUED | OUTPATIENT
Start: 2024-10-26 | End: 2024-11-05 | Stop reason: HOSPADM

## 2024-10-26 RX ORDER — AMOXICILLIN 250 MG
1 CAPSULE ORAL 2 TIMES DAILY PRN
Status: DISCONTINUED | OUTPATIENT
Start: 2024-10-26 | End: 2024-10-28

## 2024-10-26 RX ORDER — NALOXONE HYDROCHLORIDE 0.4 MG/ML
0.4 INJECTION, SOLUTION INTRAMUSCULAR; INTRAVENOUS; SUBCUTANEOUS
Status: DISCONTINUED | OUTPATIENT
Start: 2024-10-26 | End: 2024-11-05 | Stop reason: HOSPADM

## 2024-10-26 RX ORDER — ASPIRIN 81 MG/1
81 TABLET ORAL DAILY
Status: DISCONTINUED | OUTPATIENT
Start: 2024-10-27 | End: 2024-11-05 | Stop reason: HOSPADM

## 2024-10-26 RX ORDER — TACROLIMUS 1 MG/1
3 CAPSULE ORAL 2 TIMES DAILY
Status: DISCONTINUED | OUTPATIENT
Start: 2024-10-26 | End: 2024-10-28

## 2024-10-26 RX ORDER — ACETAMINOPHEN 650 MG/1
650 SUPPOSITORY RECTAL EVERY 4 HOURS PRN
Status: DISCONTINUED | OUTPATIENT
Start: 2024-10-26 | End: 2024-11-05 | Stop reason: HOSPADM

## 2024-10-26 RX ORDER — DEXTROSE MONOHYDRATE 25 G/50ML
25-50 INJECTION, SOLUTION INTRAVENOUS
Status: DISCONTINUED | OUTPATIENT
Start: 2024-10-26 | End: 2024-11-05 | Stop reason: HOSPADM

## 2024-10-26 RX ORDER — NICOTINE POLACRILEX 4 MG
15-30 LOZENGE BUCCAL
Status: DISCONTINUED | OUTPATIENT
Start: 2024-10-26 | End: 2024-11-05 | Stop reason: HOSPADM

## 2024-10-26 RX ORDER — POTASSIUM CHLORIDE 1500 MG/1
60 TABLET, EXTENDED RELEASE ORAL ONCE
Status: COMPLETED | OUTPATIENT
Start: 2024-10-26 | End: 2024-10-26

## 2024-10-26 RX ORDER — HYDROCORTISONE SODIUM SUCCINATE 100 MG/2ML
50 INJECTION INTRAMUSCULAR; INTRAVENOUS ONCE
Status: COMPLETED | OUTPATIENT
Start: 2024-10-26 | End: 2024-10-26

## 2024-10-26 RX ORDER — FLUDROCORTISONE ACETATE 0.1 MG/1
0.1 TABLET ORAL DAILY
Status: DISCONTINUED | OUTPATIENT
Start: 2024-10-27 | End: 2024-10-28

## 2024-10-26 RX ORDER — CARVEDILOL 6.25 MG/1
6.25 TABLET ORAL 2 TIMES DAILY WITH MEALS
Status: CANCELLED | OUTPATIENT
Start: 2024-10-26

## 2024-10-26 RX ORDER — CARVEDILOL 6.25 MG/1
6.25 TABLET ORAL 2 TIMES DAILY WITH MEALS
Status: DISCONTINUED | OUTPATIENT
Start: 2024-10-26 | End: 2024-11-05 | Stop reason: HOSPADM

## 2024-10-26 RX ORDER — ACYCLOVIR 400 MG/1
400 TABLET ORAL 2 TIMES DAILY
Status: DISCONTINUED | OUTPATIENT
Start: 2024-10-26 | End: 2024-10-27

## 2024-10-26 RX ORDER — FUROSEMIDE 20 MG/1
20 TABLET ORAL DAILY
Status: DISCONTINUED | OUTPATIENT
Start: 2024-10-26 | End: 2024-10-27

## 2024-10-26 RX ORDER — METOCLOPRAMIDE 5 MG/1
5 TABLET ORAL EVERY 6 HOURS PRN
Status: DISCONTINUED | OUTPATIENT
Start: 2024-10-26 | End: 2024-11-05 | Stop reason: HOSPADM

## 2024-10-26 RX ORDER — AMOXICILLIN 250 MG
1 CAPSULE ORAL 2 TIMES DAILY PRN
Status: CANCELLED | OUTPATIENT
Start: 2024-10-26

## 2024-10-26 RX ORDER — DEXTROSE MONOHYDRATE 25 G/50ML
25-50 INJECTION, SOLUTION INTRAVENOUS
Status: DISCONTINUED | OUTPATIENT
Start: 2024-10-26 | End: 2024-10-26

## 2024-10-26 RX ORDER — HYDROCORTISONE SODIUM SUCCINATE 100 MG/2ML
50 INJECTION INTRAMUSCULAR; INTRAVENOUS EVERY 6 HOURS
Status: CANCELLED | OUTPATIENT
Start: 2024-10-26

## 2024-10-26 RX ORDER — PREDNISONE 2.5 MG/1
5 TABLET ORAL EVERY MORNING
Status: DISCONTINUED | OUTPATIENT
Start: 2024-10-27 | End: 2024-11-05 | Stop reason: HOSPADM

## 2024-10-26 RX ORDER — ALBUTEROL SULFATE 90 UG/1
1-2 INHALANT RESPIRATORY (INHALATION) EVERY 6 HOURS PRN
Status: DISCONTINUED | OUTPATIENT
Start: 2024-10-26 | End: 2024-11-05 | Stop reason: HOSPADM

## 2024-10-26 RX ORDER — MAGNESIUM SULFATE HEPTAHYDRATE 40 MG/ML
4 INJECTION, SOLUTION INTRAVENOUS ONCE
Status: DISCONTINUED | OUTPATIENT
Start: 2024-10-26 | End: 2024-10-26

## 2024-10-26 RX ORDER — NICOTINE POLACRILEX 4 MG
15-30 LOZENGE BUCCAL
Status: DISCONTINUED | OUTPATIENT
Start: 2024-10-26 | End: 2024-10-26

## 2024-10-26 RX ORDER — ONDANSETRON 2 MG/ML
4 INJECTION INTRAMUSCULAR; INTRAVENOUS EVERY 6 HOURS PRN
Status: CANCELLED | OUTPATIENT
Start: 2024-10-26

## 2024-10-26 RX ORDER — LOPERAMIDE HYDROCHLORIDE 2 MG/1
2 CAPSULE ORAL 4 TIMES DAILY PRN
Status: DISCONTINUED | OUTPATIENT
Start: 2024-10-26 | End: 2024-10-30

## 2024-10-26 RX ORDER — ONDANSETRON 2 MG/ML
4 INJECTION INTRAMUSCULAR; INTRAVENOUS EVERY 6 HOURS PRN
Status: DISCONTINUED | OUTPATIENT
Start: 2024-10-26 | End: 2024-11-05 | Stop reason: HOSPADM

## 2024-10-26 RX ORDER — PANTOPRAZOLE SODIUM 40 MG/1
40 TABLET, DELAYED RELEASE ORAL DAILY
Status: DISCONTINUED | OUTPATIENT
Start: 2024-10-27 | End: 2024-11-05 | Stop reason: HOSPADM

## 2024-10-26 RX ORDER — ROSUVASTATIN CALCIUM 20 MG/1
20 TABLET, COATED ORAL
Status: DISCONTINUED | OUTPATIENT
Start: 2024-10-28 | End: 2024-11-05 | Stop reason: HOSPADM

## 2024-10-26 RX ORDER — PREDNISONE 2.5 MG/1
2.5 TABLET ORAL EVERY EVENING
Status: DISCONTINUED | OUTPATIENT
Start: 2024-10-27 | End: 2024-11-05 | Stop reason: HOSPADM

## 2024-10-26 RX ORDER — CALCIUM CARBONATE 500 MG/1
1000 TABLET, CHEWABLE ORAL 4 TIMES DAILY PRN
Status: DISCONTINUED | OUTPATIENT
Start: 2024-10-26 | End: 2024-11-05 | Stop reason: HOSPADM

## 2024-10-26 RX ORDER — MONTELUKAST SODIUM 10 MG/1
10 TABLET ORAL EVERY EVENING
Status: DISCONTINUED | OUTPATIENT
Start: 2024-10-26 | End: 2024-11-05 | Stop reason: HOSPADM

## 2024-10-26 RX ORDER — DAPSONE 25 MG/1
50 TABLET ORAL DAILY
Status: DISCONTINUED | OUTPATIENT
Start: 2024-10-27 | End: 2024-11-05 | Stop reason: HOSPADM

## 2024-10-26 RX ORDER — NALOXONE HYDROCHLORIDE 0.4 MG/ML
0.2 INJECTION, SOLUTION INTRAMUSCULAR; INTRAVENOUS; SUBCUTANEOUS
Status: DISCONTINUED | OUTPATIENT
Start: 2024-10-26 | End: 2024-11-05 | Stop reason: HOSPADM

## 2024-10-26 RX ORDER — AMOXICILLIN 250 MG
2 CAPSULE ORAL 2 TIMES DAILY PRN
Status: CANCELLED | OUTPATIENT
Start: 2024-10-26

## 2024-10-26 RX ORDER — AMOXICILLIN 250 MG
2 CAPSULE ORAL 2 TIMES DAILY PRN
Status: DISCONTINUED | OUTPATIENT
Start: 2024-10-26 | End: 2024-10-28

## 2024-10-26 RX ORDER — VANCOMYCIN HYDROCHLORIDE 1 G/200ML
1000 INJECTION, SOLUTION INTRAVENOUS
Status: DISCONTINUED | OUTPATIENT
Start: 2024-10-27 | End: 2024-10-27

## 2024-10-26 RX ORDER — LOPERAMIDE HYDROCHLORIDE 2 MG/1
4 CAPSULE ORAL 4 TIMES DAILY PRN
Status: DISCONTINUED | OUTPATIENT
Start: 2024-10-26 | End: 2024-10-26 | Stop reason: HOSPADM

## 2024-10-26 RX ORDER — MAGNESIUM SULFATE HEPTAHYDRATE 40 MG/ML
4 INJECTION, SOLUTION INTRAVENOUS ONCE
Status: COMPLETED | OUTPATIENT
Start: 2024-10-26 | End: 2024-10-26

## 2024-10-26 RX ORDER — LOPERAMIDE HYDROCHLORIDE 2 MG/1
4 CAPSULE ORAL 4 TIMES DAILY PRN
Status: CANCELLED | OUTPATIENT
Start: 2024-10-26

## 2024-10-26 RX ORDER — ONDANSETRON 4 MG/1
4 TABLET, ORALLY DISINTEGRATING ORAL EVERY 6 HOURS PRN
Status: DISCONTINUED | OUTPATIENT
Start: 2024-10-26 | End: 2024-11-05 | Stop reason: HOSPADM

## 2024-10-26 RX ORDER — LIDOCAINE 40 MG/G
CREAM TOPICAL
Status: DISCONTINUED | OUTPATIENT
Start: 2024-10-26 | End: 2024-11-05 | Stop reason: HOSPADM

## 2024-10-26 RX ADMIN — LOPERAMIDE HYDROCHLORIDE 2 MG: 2 CAPSULE ORAL at 18:41

## 2024-10-26 RX ADMIN — INSULIN ASPART 1 UNITS: 100 INJECTION, SOLUTION INTRAVENOUS; SUBCUTANEOUS at 08:13

## 2024-10-26 RX ADMIN — PREDNISONE 5 MG: 5 TABLET ORAL at 08:13

## 2024-10-26 RX ADMIN — IPRATROPIUM BROMIDE AND ALBUTEROL SULFATE 3 ML: 2.5; .5 SOLUTION RESPIRATORY (INHALATION) at 12:42

## 2024-10-26 RX ADMIN — PANTOPRAZOLE SODIUM 40 MG: 40 TABLET, DELAYED RELEASE ORAL at 08:13

## 2024-10-26 RX ADMIN — ALBUMIN HUMAN 25 G: 0.05 INJECTION, SOLUTION INTRAVENOUS at 21:44

## 2024-10-26 RX ADMIN — MONTELUKAST 10 MG: 10 TABLET, FILM COATED ORAL at 19:57

## 2024-10-26 RX ADMIN — PIPERACILLIN AND TAZOBACTAM 4.5 G: 4; .5 INJECTION, POWDER, FOR SOLUTION INTRAVENOUS at 12:33

## 2024-10-26 RX ADMIN — ACYCLOVIR 400 MG: 400 TABLET ORAL at 19:57

## 2024-10-26 RX ADMIN — FLUTICASONE FUROATE AND VILANTEROL TRIFENATATE 1 PUFF: 200; 25 POWDER RESPIRATORY (INHALATION) at 18:42

## 2024-10-26 RX ADMIN — ASPIRIN 81 MG: 81 TABLET, COATED ORAL at 08:13

## 2024-10-26 RX ADMIN — MAGNESIUM SULFATE IN WATER 4 G: 40 INJECTION, SOLUTION INTRAVENOUS at 11:13

## 2024-10-26 RX ADMIN — DAPSONE 50 MG: 25 TABLET ORAL at 08:12

## 2024-10-26 RX ADMIN — ACETAMINOPHEN 650 MG: 325 TABLET, FILM COATED ORAL at 18:47

## 2024-10-26 RX ADMIN — ACYCLOVIR 400 MG: 200 CAPSULE ORAL at 08:13

## 2024-10-26 RX ADMIN — DOXYCYCLINE 100 MG: 100 INJECTION, POWDER, LYOPHILIZED, FOR SOLUTION INTRAVENOUS at 00:42

## 2024-10-26 RX ADMIN — Medication 60 ML: at 19:57

## 2024-10-26 RX ADMIN — PIPERACILLIN AND TAZOBACTAM 4.5 G: 4; .5 INJECTION, POWDER, FOR SOLUTION INTRAVENOUS at 00:16

## 2024-10-26 RX ADMIN — Medication 500 MG: at 21:28

## 2024-10-26 RX ADMIN — FLUDROCORTISONE ACETATE 0.1 MG: 0.1 TABLET ORAL at 08:12

## 2024-10-26 RX ADMIN — PIPERACILLIN AND TAZOBACTAM 4.5 G: 4; .5 INJECTION, POWDER, FOR SOLUTION INTRAVENOUS at 06:28

## 2024-10-26 RX ADMIN — POTASSIUM CHLORIDE 60 MEQ: 1500 TABLET, EXTENDED RELEASE ORAL at 21:28

## 2024-10-26 RX ADMIN — INSULIN ASPART 3 UNITS: 100 INJECTION, SOLUTION INTRAVENOUS; SUBCUTANEOUS at 18:41

## 2024-10-26 RX ADMIN — IPRATROPIUM BROMIDE AND ALBUTEROL SULFATE 3 ML: 2.5; .5 SOLUTION RESPIRATORY (INHALATION) at 08:35

## 2024-10-26 RX ADMIN — HYDROCORTISONE SODIUM SUCCINATE 50 MG: 100 INJECTION, POWDER, FOR SOLUTION INTRAMUSCULAR; INTRAVENOUS at 00:42

## 2024-10-26 RX ADMIN — HYDROCORTISONE SODIUM SUCCINATE 50 MG: 100 INJECTION, POWDER, FOR SOLUTION INTRAMUSCULAR; INTRAVENOUS at 14:19

## 2024-10-26 RX ADMIN — DOXYCYCLINE 100 MG: 100 INJECTION, POWDER, LYOPHILIZED, FOR SOLUTION INTRAVENOUS at 12:49

## 2024-10-26 RX ADMIN — HYDROCORTISONE SODIUM SUCCINATE 50 MG: 100 INJECTION, POWDER, FOR SOLUTION INTRAMUSCULAR; INTRAVENOUS at 08:13

## 2024-10-26 RX ADMIN — TACROLIMUS 3 MG: 1 CAPSULE ORAL at 08:13

## 2024-10-26 RX ADMIN — VANCOMYCIN HYDROCHLORIDE 1000 MG: 1 INJECTION, SOLUTION INTRAVENOUS at 10:11

## 2024-10-26 RX ADMIN — APIXABAN 5 MG: 5 TABLET, FILM COATED ORAL at 19:57

## 2024-10-26 RX ADMIN — APIXABAN 5 MG: 5 TABLET, FILM COATED ORAL at 08:13

## 2024-10-26 RX ADMIN — HYDROCORTISONE SODIUM SUCCINATE 50 MG: 100 INJECTION, POWDER, FOR SOLUTION INTRAMUSCULAR; INTRAVENOUS at 21:38

## 2024-10-26 RX ADMIN — PIPERACILLIN SODIUM AND TAZOBACTAM SODIUM 4.5 G: 4; .5 INJECTION, SOLUTION INTRAVENOUS at 19:58

## 2024-10-26 RX ADMIN — SODIUM BICARBONATE: 84 INJECTION, SOLUTION INTRAVENOUS at 04:25

## 2024-10-26 RX ADMIN — VENLAFAXINE HYDROCHLORIDE 37.5 MG: 37.5 CAPSULE, EXTENDED RELEASE ORAL at 08:12

## 2024-10-26 RX ADMIN — INSULIN GLARGINE 18 UNITS: 100 INJECTION, SOLUTION SUBCUTANEOUS at 08:53

## 2024-10-26 RX ADMIN — INSULIN ASPART 2 UNITS: 100 INJECTION, SOLUTION INTRAVENOUS; SUBCUTANEOUS at 12:33

## 2024-10-26 RX ADMIN — ACETAMINOPHEN 650 MG: 325 TABLET ORAL at 08:13

## 2024-10-26 RX ADMIN — LOPERAMIDE HYDROCHLORIDE 4 MG: 2 CAPSULE ORAL at 14:19

## 2024-10-26 RX ADMIN — MINOCYCLINE HYDROCHLORIDE 100 MG: 100 INJECTION INTRAVENOUS at 20:35

## 2024-10-26 RX ADMIN — Medication 2 CAPSULE: at 19:57

## 2024-10-26 RX ADMIN — THIAMINE HCL TAB 100 MG 100 MG: 100 TAB at 18:41

## 2024-10-26 RX ADMIN — LOPERAMIDE HYDROCHLORIDE 2 MG: 2 CAPSULE ORAL at 21:51

## 2024-10-26 ASSESSMENT — ACTIVITIES OF DAILY LIVING (ADL)
DIFFICULTY_EATING/SWALLOWING: NO
ADLS_ACUITY_SCORE: 0
WALKING_OR_CLIMBING_STAIRS_DIFFICULTY: YES
FALL_HISTORY_WITHIN_LAST_SIX_MONTHS: NO
DRESSING/BATHING_DIFFICULTY: YES
ADLS_ACUITY_SCORE: 0
CONCENTRATING,_REMEMBERING_OR_MAKING_DECISIONS_DIFFICULTY: YES
ADLS_ACUITY_SCORE: 0
WALKING_OR_CLIMBING_STAIRS: OTHER (SEE COMMENTS)
DOING_ERRANDS_INDEPENDENTLY_DIFFICULTY: YES
ADLS_ACUITY_SCORE: 0
DRESSING/BATHING: BATHING DIFFICULTY, ASSISTANCE 1 PERSON;DRESSING DIFFICULTY, ASSISTANCE 1 PERSON
ADLS_ACUITY_SCORE: 0
ADLS_ACUITY_SCORE: 0
CHANGE_IN_FUNCTIONAL_STATUS_SINCE_ONSET_OF_CURRENT_ILLNESS/INJURY: NO
TOILETING_ISSUES: NO
ADLS_ACUITY_SCORE: 0

## 2024-10-26 NOTE — PLAN OF CARE
Neuro: A&Ox4. [place]. L pupil irregular/sight blurry at baseline.   Cardiac: NSR. VSS.   Respiratory: Sating >92% on RA.  GI/: Primofit in place. BM X1, incontinent, will use bedpan.  Diet/appetite: Denies nausea. On regular diet.  Activity: Lift assist. 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  Pain: At acceptable level on current regimen. Reports mild pain in LLE amputation site.   Skin: Severe bruising to bilateral upper E, scattered bruising to trunk. Blanchable redness to sacrum, mepilex applied. .  LDA's: LPIV, RPIV saline locked. Pt reports some stinging w/ flushing RPIV.     Plan: Continue with POC. Notify primary team with changes.     Goal Outcome Evaluation:      Plan of Care Reviewed With: patient    Overall Patient Progress: no changeOverall Patient Progress: no change    Outcome Evaluation: Transferred from [facility] ICU. VSS, afebrile, no SOB. Lactic decreasing, now at 4.2.

## 2024-10-26 NOTE — CODE/RAPID RESPONSE
Rapid Response Team Note    Assessment   A rapid response was called on Aubrey Duncan due to lactic acidosis. This presentation is likely due to known prior sepsis and lactic acidosis 2/2 immunocompromised state (transplant) and UTI vs pneumonia.    Plan   -  continue Abx as per primary w/ zosyn  - MRSA neg  - monitor blood cultures, hold off repeat as patient improving   - lactic draw per protocol  - no indication for IVF at this time, encourage oral fluids  -  The Internal Medicine primary team was able to be reached and they are in agreement with the above plan.(Team also sw patient at bedside prior to my arrival)  -  Disposition: The patient will remain on the current unit. We will continue to monitor this patient closely.  -  Reassessment and plan follow-up will be performed by the primary team    Abbi Bruno PA-C  Rapid Response Team MEETA  Securely message with [employer]     Medical Decision Making       25 MINUTES SPENT BY ME on the date of service doing chart review, history, exam, documentation & further activities per the note.          Hospital Course   Brief Summary of events leading to rapid response:   RRT paged for lactic 4.2, has continued to be elevated in the setting of known sepsis with ongoing treatment including Abx    Patient states he feels well and is ordering dinner.     Denies N/V, F/C, chest pain, sob, palpitations, loss of appetite, abdominal pain, rashes, lumps, lesions, urinary complaints, changes to bowels or other complaints.       Physical Exam   Vital Signs: Temp: 97.4  F (36.3  C) Temp src: Oral BP: 128/78 Pulse: 90   Resp: 24 SpO2: 93 % O2 Device: None (Room air)    Weight: 143 lbs 15.37 oz      Exam:   /82 (BP Location: Right arm)   Pulse 91   Temp 97.7  F (36.5  C) (Oral)   Resp 20   Wt 65.3 kg (143 lb 15.4 oz)   SpO2 93%   BMI 22.55 kg/m    Generalized appearance: A&O, NAD  HEENT: NC, AT, pupils equal and round, sclera anicteric  CV: RRR, no m/r/g  PULM: CTAB,  "non-labored breathing  GI: NABS, soft, NT, ND  Extremities: no edema, + PT and radial pulses  MSK: moves all extremities, no gross deformities  SKIN: CDI, noncyanotic, anicteric  Psych: Mood and affect appropriate      Significant Results and Procedures   Lactic 4.2    Sepsis Evaluation   The patient is known to have an infection.    Aubrey Duncan meets SIRS criteria but does NOT have a lactate >2 or other evidence of acute organ damage. These vital signs, lab and physical exam findings constitute a diagnosis of SEPSIS.         Anti-infectives (From now, onward)      Start     Dose/Rate Route Frequency Ordered Stop    10/27/24 0800  dapsone (ACZONE) tablet 50 mg        Note to Pharmacy: PTA Sig:Take 2 tablets (50 mg) by mouth daily      50 mg Oral DAILY 10/26/24 1735      10/26/24 2000  acyclovir (ZOVIRAX) tablet 400 mg        Note to Pharmacy: PTA Sig:TAKE 1 TABLET (400 MG) BY MOUTH 2 TIMES DAILY      400 mg Oral 2 TIMES DAILY 10/26/24 1735      10/26/24 1900  piperacillin-tazobactam (ZOSYN) intermittent infusion 4.5 g        Note to Pharmacy: For [facility], [facility] and [facility]: For Zosyn-naive patients, use the \"Zosyn initial dose + extended infusion\" order panel.    4.5 g  200 mL/hr over 30 Minutes Intravenous EVERY 6 HOURS 10/26/24 1834      10/26/24 1900  minocycline (MINOCIN) 100 mg in sodium chloride 0.9 % 100 mL intermittent infusion         100 mg  100 mL/hr over 60 Minutes Intravenous EVERY 12 HOURS 10/26/24 1834            Current antibiotic coverage is appropriate for source of infection.     "

## 2024-10-26 NOTE — PROGRESS NOTES
Report given to Chasity CAMPA for transport to [facility].  Family was updated also by writer.

## 2024-10-26 NOTE — Clinical Note
dry, intact, no bleeding and no hematoma. LFA sheath removed, manual pressure held to hemostasis, Primapore

## 2024-10-26 NOTE — PHARMACY
Received call from [facility] pharamcist with result from UC collected 10/24. UC growing > 100,000 cfu/ml e. Coli, no sensitivities available at this time. Patient is currently on Zosyn which should provide adequate coverage until sensitivities back. Patient transferring to higher level of care later tonight.  Ayse Clinton, Pharm.D.

## 2024-10-26 NOTE — PROGRESS NOTES
End Of Shift Note      Subjective/Objective:    Neuro: Patient mentation improving throughout the night.  He is much more alert and conversant this morning.  Self reports feeling better overall today.    Cardiac: rate controlled overnight.  BP stable with MAP >65 overnight    Resp: Has been on room air since about 0300 with O2 90-96%.  Prior to that on 2 L NC    GI/: Primofit in place and replaced x2 overnight.  Good urinary output.  Had 3 loose stool overnight, 2 incont and 1 on bedpan.      Endo: 's overnight.    MSK: Lactic critical overnight.  Most recent 4.9.  Have been FYI paged to provider and no additional interventions performed overnight.    Skin: Left leg wound redressed overnight after dressing fell off. Scattered bruising covering all of upper arms    LDAs: PIV x2.  Currently infusing

## 2024-10-26 NOTE — PROGRESS NOTES
[facility] DISCHARGE NOTE    Patient discharged to [facility] at 3:15 PM via cart. Accompanied by other:three EMS attendants.  Discharge instructions reviewed with patient, spouse, and daughter, opportunity offered to ask questions. Prescriptions - None ordered for discharge. All belongings sent with patient.    Svitlana Blue RN    Call to Roberta CAMPA at [facility] for update on patient departure.
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
## 2024-10-26 NOTE — PROGRESS NOTES
[facility]           Assessment & Plan          Aubrey Duncan is a 69 year old male who presents on 10/24/2024 with lethargy. On evaluation he is septic with fever, tachycardia, and elevated lactic acid. CT chest/abdomen/pelvis showing possible pneumonia and urinalysis UTI. Recent left BKA with wound dehiscence and minimal purulent drainage, no obvious cellulitis. Unclear source of infection, however started on vancomycin and Zosyn with fluid bolus followed by maintenance.     Sepsis with septic shock - most likely due to pneumonia vs UTI    Doubt cellulitis of left stump  Encephalopathy due to sepsis   Complicated by immune suppression due to lung transplant as below    - Arrived to outside hospital febrile 102.8 (normal prior to transfer). Tachycardic 109 with max 122. EGK sinus rhythm. Leukopenic (2.0). Lactate 3.2 -> 3.1 -> 2.2. CRP elevated 296.29. Procalcitonin 16.11. UA with pyuria and bacteriuria. Started on ceftriaxone.  CT chest/abdomen/pelvis showing interval development of new hazy ill-defined pulmonary nodules involving the right upper lobe and right middle lobe having the appearance of an acute infectious/inflammatory etiology. Interval decrease in prior consolidation posterior right lower lobe with minimal residual atelectasis remaining right lower lobe. Minimal atelectasis left lower lobe with chronic left basilar pleural fluid remaining stable. Gallbladder distension, no cholelithiasis, with mild extrahepatic biliary prominence without choledocholithiasis. LFTs and bilirubin WNL. Diffuse mild pain to palpation on abdominal exam on admission, but no pain/tenderness AM 10/25. Lungs with LLL crackles, remainder of lung fields clear. Left stump with wound dehiscence and minimal drainage but otherwise looked good AM 10/25. No significant erythema or warmth.  - outside UA showed 10-20 WBCs and trace leukocyte esterase - no apparent urine culture sent   - admitted on  Vancomycin (pharmacy to dose) and piperacillin-tazobactam 4.5 g q6hrs  - LR 1 liter bolus followed by 125 ml/heart rate on admission   - Acetaminophen available for fever  - became hypotensive AM 10/25 as below.  Persisted despite initial 30cc/kg bolus.  Gave additional 1L bolus, see discussion of PICC/pressors below   - continued maintenance LR @ 100/hour, then transitioned to bicarb infusion overnight 10/25 given compensated metabolic acidosis, saline locked AM 10/26.    - transplant recommended adding minocycline given history of stenotrophomonas that grew from his wound culture from the right knee 8/25/24 - this however is non-formulary and would have to be sent up from the U - discussed with ID who agreed with pharmacy that substituting doxycycline would be reasonable - started on IV doxycycline 10/25.     -  ? 291 ? 391  - procalcitonin 16 ? 37  - lactic 2.2 ? 2.8 ? 3.7 ? 7.2 (gave additional 1L bolus) ? 5.3 ? 5.2 ? 4.5 ? 4.9  - holding off on further fluid, with having received extensive rehydration 10/25 and blood pressure stabilizing 10/26 - recheck lactic this afternoon and in AM to trend   -ID tele-consulted - agreed with current management.  Added:  - CMV, fungitel, fungal antibodies, histoplasma, aspergillus - pending legionella and strep pneumo   - MRSA PCR negative.  Legionella and strep pneumo negative.  Covid, RSV, influenza negative.   - urine culture from outside hospital apparently growing E coli - sensitivities pending    - UA and urine culture reordered on admission and pending, but collected after being on antibiotics    - blood culture from 10/25 pending, collected after starting antibiotics    Attempted to transfer to [facility] pm  10/26 as per Tele-ICU recommendations - patient was accepted but they were unable to find a bed for him.  Although he is improving 10/26 he remains on the list for transfer, particularly given his extremely complex background and transplant status with  persistent markedly elevated CRP and procalcitonin showing significant infection still.       Hypotension - suspect due to septic shock vs acute adrenal insufficiency  On daily prednisone for transplant.    Treating shock as above.   - for possible adrenal insufficiency, gave hydrocortisone 100 mg x 1 then 50 mg every 6 hours starting AM 10/25.  Reassess 10/27  - continued home prednisone in the background even while on this per transplant recommendations.    - attempted to place PICC 10/25 with plan to start noerpinephrine prn - however PICC team was unable to place PICC line.  In the meantime patient's blood pressure improved to 100's systolic.   - blood pressure was up an down 10/25 but stabilizing 10/26.  Has not needed noerpinephrine, still holding off on central line as a result.        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 No hypoxia or shortness of breath. Lungs clear on auscultation. Managed PTA with prednisone 5 mg AM, prednisone 5 mg PM, tacrolimus 3 mg BID, dapsone 40 mg for PJP prophylaxis, montelukast 10 mg, azithromycin three times per week, Advair inhaler  - Continued prednisone BID, tacrolimus, dapsone, and montelukast.  - Duonebs PRN available  - discussed with transplant team from [facility] 10/25 - agreed with current plan as above.  Continue prednisone and tacrolimus.  They agreed with continuing current cares here for now.    - recommended checking tacrolimus trough with AM labs 10/26, pending.   Appreciate transplant teams assistance/following along.          Essential hypertension    Chronic. Blood pressure elevated 169/76. Managed PTA with carvedilol 6.25  mg BID and furosemide 20 mg.   - Held furosemide and coreg given hypotension/shock as above   - resumed coreg with holding parameters 10/26 - reassess lasix and holding parameters for coreg 10/27         Coronary artery disease (CAD) s/p stenting  Dyslipidemia    Follows with cardiology, last seen 8/2024. CAD s/p PCI with MEGHNA x 1 to LAD (1/11/24) and PCI with MEGHNA x 1 to RCA (2/16/24). Managed PTA with DAPT (ASA 81 mg and clopidogrel 75 mg). He is to be on DAPT x 1 year followed by lifelong ASA 81 mg monotherapy. Also taking rosuvastatin 20 mg three times per week.  - Continued ASA and clopidogrel - reassess if hemoglobin/platelets continue to drop   - Continued rosuvastatin   - held coreg as above, resumed 10/26        Bilateral below knee amputation (BKA)  H/o bilateral chronic limb-threatening ischemia   Peripheral arterial disease    S/p bilateral BKA (left 9/25 and right 8/28). H/o significant PAD and noted to have DVT in setting of HIT and recurrence of DVT and in setting of PAD. PTA apixiban 2.5 mg BID.  - Continue apixiban BID  - WOC consulted for left stump wounds once available.        History of PE/DVTs  - continued home apixaban - reassess if hemoglobin/platelets continue to drop        History of diffuse b-cell lymphoma of esophagus  - diagnosed after GI bleed in 3/24.  Started on weekly rituximab x 4 doses in 4/2024.  Restaging PET after this showed good response with decreased hypermetabolism in distal duodenum.    - held off on further rituximab given plan for vascular surgery for non-healing foot ulcers.    - follows with Dr. Drake ([facility] oncology) and perhaps with a local oncologist as well.  Plan was for repeat PET scan in 3 months, which would be this month but I don't see this scheduled.  Recommend follow-up with oncology on discharge.          Pancytopenia, new  Suspect leukopenia and thrombocytopenia are due to sepsis     WBC 2.0 ? 1.4 ? 2.6 ? 3.2    Hemoglobin 10.5 ? 9.2 ? 9.5 ? 9.1  - has chronic  anemia as below    platelets 143 ? 120 ? 110 ? 125         Chronic anemia    Appears stable. Hemoglobin 10.5. Baseline hemoglobin 8.0 - 10.0.    following, baseline so far         Chronic kidney disease state 3b    Appears stable. Creatine 0.82 and GFR >90. Baseline creatine near 0.90 - 1.0.  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 BG 78. Last hemoglobin A1c 4.2% 8/2024. Managed PTA with prandial insulin aspart 5 U with breakfast and 3 U with lunch/dinner. Basal insulin glargine 18 U in AM.  - Continue basal insulin glargine 18 U in AM  - Continue prandial insulin aspart 5 U with breakfast and 3 U with lunch/dinner  - Medium resistance SSI  - may need increased insulin after starting high dose steroids 10/25 - reasonable control so far, following        History of Recurrent CMV viremia     Managed PTA with acyclovir 400 mg BID.  - Continue acyclovir BID        Hypokalemia   Due to IVF/poor intake - started on RN replacement protocol 10/25   normalized        Hypomagnesemia    Magnesium 1.5.  - Replace with RN driven protocol        Hyponatremia  Mild, normalized with IVF as above         Gastroesophageal reflux disease (GERD)    Chronic. Managed PTA with pantoprazole 40 mg  - Continue pantoprazole         Clinically Significant Risk Factors Present on Admission     # Drug Induced Coagulation Defect: home medication list includes an anticoagulant medication  # Drug Induced Platelet Defect: home medication list includes an antiplatelet medication   # Hypertension: Noted on problem list    # Anemia: based on hgb <11  # Financial/Environmental Concerns:         Naranjo Catheter: Not present  Code Status: Full Code    Lines: PIV, unable to place PICC as above          Prophylaxis  Continued home apixaban       Diet  Orders Placed This Encounter      Moderate Consistent Carb (60 g CHO per Meal) Diet                      Disposition Plan        Medically Ready for  Discharge: Anticipated in 2-4 Days                  Tai Zelaya MD  Hospitalist Service  [facility]  Securely message with the [employer] Web Console (learn more here)  Text page via [employer] Paging/Directory             Interval History:   Patient symptomatically much improved.  He is alert and awake and carrying on a normal conversation today.  Denies pain.  No fever or chills. Says he still feels kind or crummy but much better.  No focal complaints.  Slight soreness in medial area of left amputation site but this is unchanged from how it's been.  No new or worsening symptoms.  Mental status markedly improved and although he says he still feels slightly foggy he appears now close to or at baseline from a mental status standpoint.  Blood pressure stable this AM.                  Review of Systems:    ROS: 10 point ROS neg other than the symptoms noted above in the HPI.           Medications:   Current active medications and PTA medications reviewed, see medication list for details.            Physical Exam:   Vitals were reviewed  Patient Vitals for the past 24 hrs:   BP Temp Temp src Pulse Resp SpO2 Weight   10/26/24 0835 -- -- -- 93 14 95 % --   10/26/24 0800 132/73 -- -- 85 20 92 % --   10/26/24 0724 -- 97.1  F (36.2  C) Oral 90 15 94 % --   10/26/24 0600 (!) 150/76 -- -- 80 16 97 % --   10/26/24 0544 -- -- -- -- -- -- 64 kg (141 lb 1.5 oz)   10/26/24 0500 -- -- -- 89 21 -- --   10/26/24 0400 137/82 -- -- 80 (!) 34 93 % --   10/26/24 0353 -- 96.8  F (36  C) Axillary -- -- 96 % --   10/26/24 0300 -- -- -- 74 22 97 % --   10/26/24 0200 123/64 -- -- 74 20 97 % --   10/26/24 0100 -- -- -- 74 18 97 % --   10/26/24 0000 135/71 -- -- 75 16 91 % --   10/25/24 2358 -- -- -- 84 -- -- --   10/25/24 2300 116/67 -- -- 78 18 97 % --   10/25/24 2200 120/78 -- -- 92 23 96 % --   10/25/24 2000 117/64 -- -- 75 19 96 % --   10/25/24 1924 99/58 (!) 96.3  F (35.7  C) Axillary -- -- -- --   10/25/24 1900  115/58 -- -- 80 18 97 % --   10/25/24 1800 106/60 -- -- 83 16 100 % --   10/25/24 1700 116/64 -- -- 82 25 95 % --   10/25/24 1600 125/62 97.7  F (36.5  C) Axillary 85 20 91 % --   10/25/24 1500 118/67 -- -- 83 25 100 % --   10/25/24 1430 -- -- -- 76 22 100 % --   10/25/24 1400 92/59 -- -- 74 25 96 % --   10/25/24 1300 90/49 -- -- 90 25 98 % --   10/25/24 1230 133/70 -- -- 98 24 95 % --   10/25/24 1204 (!) 88/57 98.1  F (36.7  C) Oral 92 27 95 % --   10/25/24 1100 -- -- -- 93 18 95 % --   10/25/24 1019 104/56 -- -- 96 24 96 % --   10/25/24 1001 113/51 -- -- 97 28 95 % --   10/25/24 1000 113/51 -- -- 98 20 95 % --   10/25/24 0910 (!) 80/41 -- -- -- -- -- --       Temperatures:  Current - Temp: 97.1  F (36.2  C); Max - Temp  Av.2  F (36.2  C)  Min: 96.3  F (35.7  C)  Max: 98.1  F (36.7  C)  Respiration range: Resp  Av.2  Min: 14  Max: 34  Pulse range: Pulse  Av.6  Min: 74  Max: 98  Blood pressure range: Systolic (24hrs), Av , Min:80 , Max:150   ; Diastolic (24hrs), Av, Min:41, Max:82    Pulse oximetry range: SpO2  Av.8 %  Min: 91 %  Max: 100 %  I/O last 3 completed shifts:  In: 1526.67 [P.O.:540; I.V.:986.67]  Out: 2451 [Urine:2450; Stool:1]    Intake/Output Summary (Last 24 hours) at 10/26/2024 0908  Last data filed at 10/26/2024 0846  Gross per 24 hour   Intake 1646.67 ml   Output 2451 ml   Net -804.33 ml     EXAM:  General: awake and alert, NAD, at or near baseline now   Head: normocephalic  Neck: unremarkable, no lymphadenopathy   HEENT: oropharynx pink and moist    Heart: Regular rate and rhythm, no murmurs, rubs, or gallops  Lungs: clear to auscultation bilaterally with good air movement throughout  Abdomen: soft, non-tender, no masses or organomegaly  Extremities: no edema in lower extremities, bilateral below knee amputations  Skin: healing wound left lower extremity unchanged - no obvious fluid or fluctuance, mildly sore medially per patient which he says is unchanged from how it's  been as it's been healing.  No increasing swelling or pain.  No erythema.  Skin otherwise unchanged as visualized               Data:     Reviewed data:  Results for orders placed or performed during the hospital encounter of 10/24/24 (from the past 24 hours)   Glucose by meter   Result Value Ref Range    GLUCOSE BY METER POCT 109 (H) 70 - 99 mg/dL   Glucose by meter   Result Value Ref Range    GLUCOSE BY METER POCT 97 70 - 99 mg/dL   Legionella Urinary Antigen and Streptococcus pneumoniae antigen    Specimen: Urine, NOS   Result Value Ref Range    Legionella pneumophila serogroup 1 urinary antigen Negative Negative    Streptococcus pneumoniae antigen Negative Negative    Legionella pneumophila Urinary/Strep pneumoniae Antigen Specimen Type Urine     Narrative    The result of this test as well as culture, serology, or other antigen detection methods should be used in conjunction with clinical findings to make an accurate diagnosis.   Glucose by meter   Result Value Ref Range    GLUCOSE BY METER POCT 121 (H) 70 - 99 mg/dL   Asymptomatic Influenza A/B, RSV, & SARS-CoV2 PCR (COVID-19) Nasopharyngeal    Specimen: Nasopharyngeal; Swab   Result Value Ref Range    Influenza A PCR Negative Negative    Influenza B PCR Negative Negative    RSV PCR Negative Negative    SARS CoV2 PCR Negative Negative    Narrative    Testing was performed using the Xpert Xpress CoV2/Flu/RSV Assay on the Cepheid GeneXpert Instrument. This test should be ordered for the detection of SARS-CoV2, influenza, and RSV viruses in individuals with signs and symptoms of respiratory tract infection. This test is for in vitro diagnostic use under the [place] FDA for laboratories certified under CLIA to perform high or moderate complexity testing. This test has been [place] FDA cleared. A negative result does not rule out the presence of PCR inhibitors in the specimen or target RNA in concentration below the limit of detection for the assay. If only one viral target  is positive but coinfection with multiple targets is suspected, the sample should be re-tested with another FDA cleared, approved, or authorized test, if coninfection would change clinical management. This test was validated by the [facility]. These laboratories are certified under the Clinical Laboratory Improvement Amendments of 1988 (CLIA-88) as qualified to perfom high complexity laboratory testing.   MRSA MSSA PCR, Nasal Swab    Specimen: Nare, Left; Swab   Result Value Ref Range    MRSA Target DNA Negative Negative    SA Target DNA Negative     Narrative    The [employer]  Xpert SA Nasal Complete assay performed in the [employer]  Dx System is a qualitative in vitro diagnostic test designed for rapid detection of Staphylococcus aureus (SA) and methicillin-resistant Staphylococcus aureus (MRSA) from nasal swabs in patients at risk for nasal colonization. The test utilizes automated real-time polymerase chain reaction (PCR) to detect MRSA/SA DNA. The Xpert SA Nasal Complete assay is intended to aid in the prevention and control of MRSA/SA infections in healthcare settings. The assay is not intended to diagnose, guide or monitor treatment for MRSA/SA infections, or provide results of susceptibility to methicillin. A negative result does not preclude MRSA/SA nasal colonization.    UA with Microscopic reflex to Culture    Specimen: Urine, NOS   Result Value Ref Range    Color Urine Yellow Colorless, Straw, Light Yellow, Yellow    Appearance Urine Clear Clear    Glucose Urine Negative Negative mg/dL    Bilirubin Urine Negative Negative    Ketones Urine Negative Negative mg/dL    Specific Gravity Urine 1.033 1.003 - 1.035    Blood Urine Small (A) Negative    pH Urine 5.0 5.0 - 7.0    Protein Albumin Urine Negative Negative mg/dL    Urobilinogen Urine Normal Normal, 2.0 mg/dL    Nitrite Urine Negative Negative    Leukocyte Esterase Urine Trace (A) Negative    Bacteria Urine Few (A) None Seen /HPF     Mucus Urine Present (A) None Seen /LPF    RBC Urine 2 <=2 /HPF    WBC Urine 12 (H) <=5 /HPF    Squamous Epithelials Urine <1 <=1 /HPF    Narrative    Urine Culture ordered based on laboratory criteria   Blood gas venous   Result Value Ref Range    pH Venous 7.32 7.32 - 7.43    pCO2 Venous 26 (L) 40 - 50 mm Hg    pO2 Venous 47 25 - 47 mm Hg    Bicarbonate Venous 14 (L) 21 - 28 mmol/L    Base Excess/Deficit Venous -11.3 (L) -3.0 - 3.0 mmol/L    FIO2 21     Oxyhemoglobin Venous 76 (H) 70 - 75 %    O2 Sat, Venous 77.6 (H) 70.0 - 75.0 %    Narrative    In healthy individuals, oxyhemoglobin (O2Hb) and oxygen saturation (SO2) are approximately equal. In the presence of dyshemoglobins, oxyhemoglobin can be considerably lower than oxygen saturation.   Lactic acid whole blood   Result Value Ref Range    Lactic Acid 7.2 (HH) 0.7 - 2.0 mmol/L   Extra Tube    Narrative    The following orders were created for panel order Extra Tube.  Procedure                               Abnormality         Status                     ---------                               -----------         ------                     Extra Green Top (Lithium...[[id number]]                      Final result                 Please view results for these tests on the individual orders.   Extra Green Top (Lithium Heparin) ON ICE   Result Value Ref Range    Hold Specimen JI    Glucose by meter   Result Value Ref Range    GLUCOSE BY METER POCT 110 (H) 70 - 99 mg/dL   Potassium   Result Value Ref Range    Potassium 4.8 3.4 - 5.3 mmol/L   Lactic acid whole blood   Result Value Ref Range    Lactic Acid 5.3 (HH) 0.7 - 2.0 mmol/L   Blood gas venous   Result Value Ref Range    pH Venous 7.29 (L) 7.32 - 7.43    pCO2 Venous 37 (L) 40 - 50 mm Hg    pO2 Venous 29 25 - 47 mm Hg    Bicarbonate Venous 18 (L) 21 - 28 mmol/L    Base Excess/Deficit Venous -8.0 (L) -3.0 - 3.0 mmol/L    FIO2 28     Oxyhemoglobin Venous 46 (L) 70 - 75 %    O2 Sat, Venous 46.9 (L) 70.0 - 75.0 %     Narrative    In healthy individuals, oxyhemoglobin (O2Hb) and oxygen saturation (SO2) are approximately equal. In the presence of dyshemoglobins, oxyhemoglobin can be considerably lower than oxygen saturation.   CBC with platelets   Result Value Ref Range    WBC Count 2.6 (L) 4.0 - 11.0 10e3/uL    RBC Count 2.93 (L) 4.40 - 5.90 10e6/uL    Hemoglobin 9.5 (L) 13.3 - 17.7 g/dL    Hematocrit 29.5 (L) 40.0 - 53.0 %     (H) 78 - 100 fL    MCH 32.4 26.5 - 33.0 pg    MCHC 32.2 31.5 - 36.5 g/dL    RDW 16.2 (H) 10.0 - 15.0 %    Platelet Count 110 (L) 150 - 450 10e3/uL   Comprehensive metabolic panel   Result Value Ref Range    Sodium 135 135 - 145 mmol/L    Potassium 4.8 3.4 - 5.3 mmol/L    Carbon Dioxide (CO2) 17 (L) 22 - 29 mmol/L    Anion Gap 15 7 - 15 mmol/L    Urea Nitrogen 23.1 (H) 8.0 - 23.0 mg/dL    Creatinine 0.92 0.67 - 1.17 mg/dL    GFR Estimate 89 >60 mL/min/1.73m2    Calcium 7.6 (L) 8.8 - 10.4 mg/dL    Chloride 103 98 - 107 mmol/L    Glucose 119 (H) 70 - 99 mg/dL    Alkaline Phosphatase 113 40 - 150 U/L    AST 48 (H) 0 - 45 U/L    ALT 34 0 - 70 U/L    Protein Total 4.7 (L) 6.4 - 8.3 g/dL    Albumin 2.2 (L) 3.5 - 5.2 g/dL    Bilirubin Total 0.5 <=1.2 mg/dL   Extra Tube    Narrative    The following orders were created for panel order Extra Tube.  Procedure                               Abnormality         Status                     ---------                               -----------         ------                     Extra Green Top (Lithium...[[id number]]                      Final result               Extra Purple Top Tube[[id number]]                            Final result                 Please view results for these tests on the individual orders.   Extra Green Top (Lithium Heparin) Tube   Result Value Ref Range    Hold Specimen JIC    Extra Purple Top Tube   Result Value Ref Range    Hold Specimen JIC    Glucose by meter   Result Value Ref Range    GLUCOSE BY METER POCT 146 (H) 70 - 99 mg/dL   Lactic  acid whole blood   Result Value Ref Range    Lactic Acid 5.2 (HH) 0.7 - 2.0 mmol/L   Blood gas venous   Result Value Ref Range    pH Venous 7.35 7.32 - 7.43    pCO2 Venous 32 (L) 40 - 50 mm Hg    pO2 Venous 62 (H) 25 - 47 mm Hg    Bicarbonate Venous 18 (L) 21 - 28 mmol/L    Base Excess/Deficit Venous -7.2 (L) -3.0 - 3.0 mmol/L    FIO2 28     Oxyhemoglobin Venous 88 (H) 70 - 75 %    O2 Sat, Venous 90.6 (H) 70.0 - 75.0 %    Narrative    In healthy individuals, oxyhemoglobin (O2Hb) and oxygen saturation (SO2) are approximately equal. In the presence of dyshemoglobins, oxyhemoglobin can be considerably lower than oxygen saturation.   Extra Tube    Narrative    The following orders were created for panel order Extra Tube.  Procedure                               Abnormality         Status                     ---------                               -----------         ------                     Extra Green Top (Lithium...[[id number]]                      Final result                 Please view results for these tests on the individual orders.   Extra Green Top (Lithium Heparin) Tube   Result Value Ref Range    Hold Specimen [facility]    Glucose by meter   Result Value Ref Range    GLUCOSE BY METER POCT 168 (H) 70 - 99 mg/dL   Blood gas venous   Result Value Ref Range    pH Venous 7.41 7.32 - 7.43    pCO2 Venous 33 (L) 40 - 50 mm Hg    pO2 Venous 90 (H) 25 - 47 mm Hg    Bicarbonate Venous 21 21 - 28 mmol/L    Base Excess/Deficit Venous -3.1 (L) -3.0 - 3.0 mmol/L    FIO2 28     Oxyhemoglobin Venous 94 (H) 70 - 75 %    O2 Sat, Venous 96.4 (H) 70.0 - 75.0 %    Narrative    In healthy individuals, oxyhemoglobin (O2Hb) and oxygen saturation (SO2) are approximately equal. In the presence of dyshemoglobins, oxyhemoglobin can be considerably lower than oxygen saturation.   Lactic acid whole blood   Result Value Ref Range    Lactic Acid 4.5 (HH) 0.7 - 2.0 mmol/L   Extra Tube    Narrative    The following orders were created for panel  order Extra Tube.  Procedure                               Abnormality         Status                     ---------                               -----------         ------                     Extra Green Top (Lithium...[[id number]]                      Final result                 Please view results for these tests on the individual orders.   Extra Green Top (Lithium Heparin) Tube   Result Value Ref Range    Hold Specimen JIC    Glucose by meter   Result Value Ref Range    GLUCOSE BY METER POCT 172 (H) 70 - 99 mg/dL   Comprehensive metabolic panel   Result Value Ref Range    Sodium 140 135 - 145 mmol/L    Potassium 3.8 3.4 - 5.3 mmol/L    Carbon Dioxide (CO2) 21 (L) 22 - 29 mmol/L    Anion Gap 15 7 - 15 mmol/L    Urea Nitrogen 22.1 8.0 - 23.0 mg/dL    Creatinine 0.79 0.67 - 1.17 mg/dL    GFR Estimate >90 >60 mL/min/1.73m2    Calcium 7.6 (L) 8.8 - 10.4 mg/dL    Chloride 104 98 - 107 mmol/L    Glucose 198 (H) 70 - 99 mg/dL    Alkaline Phosphatase 104 40 - 150 U/L    AST 47 (H) 0 - 45 U/L    ALT 33 0 - 70 U/L    Protein Total 4.5 (L) 6.4 - 8.3 g/dL    Albumin 2.2 (L) 3.5 - 5.2 g/dL    Bilirubin Total 0.4 <=1.2 mg/dL   Magnesium   Result Value Ref Range    Magnesium 1.4 (L) 1.7 - 2.3 mg/dL   Phosphorus   Result Value Ref Range    Phosphorus 3.7 2.5 - 4.5 mg/dL   CBC with platelets   Result Value Ref Range    WBC Count 3.2 (L) 4.0 - 11.0 10e3/uL    RBC Count 2.87 (L) 4.40 - 5.90 10e6/uL    Hemoglobin 9.1 (L) 13.3 - 17.7 g/dL    Hematocrit 27.8 (L) 40.0 - 53.0 %    MCV 97 78 - 100 fL    MCH 31.7 26.5 - 33.0 pg    MCHC 32.7 31.5 - 36.5 g/dL    RDW 16.2 (H) 10.0 - 15.0 %    Platelet Count 125 (L) 150 - 450 10e3/uL   Blood gas venous   Result Value Ref Range    pH Venous 7.46 (H) 7.32 - 7.43    pCO2 Venous 33 (L) 40 - 50 mm Hg    pO2 Venous 48 (H) 25 - 47 mm Hg    Bicarbonate Venous 24 21 - 28 mmol/L    Base Excess/Deficit Venous 0.2 -3.0 - 3.0 mmol/L    FIO2 0     Oxyhemoglobin Venous 83 (H) 70 - 75 %    O2 Sat, Venous  84.9 (H) 70.0 - 75.0 %    Narrative    In healthy individuals, oxyhemoglobin (O2Hb) and oxygen saturation (SO2) are approximately equal. In the presence of dyshemoglobins, oxyhemoglobin can be considerably lower than oxygen saturation.   Lactic acid whole blood   Result Value Ref Range    Lactic Acid 4.9 (HH) 0.7 - 2.0 mmol/L   CRP inflammation   Result Value Ref Range    CRP Inflammation 391.41 (H) <5.00 mg/L   Procalcitonin   Result Value Ref Range    Procalcitonin 37.60 (HH) <0.50 ng/mL   Glucose by meter   Result Value Ref Range    GLUCOSE BY METER POCT 184 (H) 70 - 99 mg/dL   Magnesium   Result Value Ref Range    Magnesium 1.4 (L) 1.7 - 2.3 mg/dL     *Note: Due to a large number of results and/or encounters for the requested time period, some results have not been displayed. A complete set of results can be found in Results Review.           Attestation:  I have reviewed today's vital signs, notes, medications, labs and imaging.  Amount of time spent managing this patient today providing critical care: 70 minutes.

## 2024-10-26 NOTE — PLAN OF CARE
Admission          10/26/2024  4:20 PM  -----------------------------------------------------------  Reason for admission: septic shock from pneumonia/UTI complicated by bilateral lung trx  Primary team notified of pt arrival.  Admitted from: [facility] ICU  Via: stretcher  Accompanied by: EMS  Belongings: Placed in closet  Admission Profile: complete  Teaching: orientation to unit and call light- call light within reach, call don't fall, use of console, meal times, when to call for the RN, and enforced importance of safety   Access: RPIV & LPIV  Telemetry:Placed on pt  Ht./Wt.: complete  Code Status verified on armband: yes  2 RN Skin Assessment Completed By: Nancy CHAPA RN & Roberta HORTON RN. UTV LLE, WOC consult placed. Significant bruising to upper extremities, scattered bruising throughout trunk and lower E. Scabbing to RLE. Blanchable redness to sacrum, mepi placed.   Med Rec completed: in progress  Suction/Ambu bag/Flowmeter at bedside: yes  Is patient having diarrhea upon admission- if YES fill out testing algorithm : yes - per pt, baseline reports diarrhea since 2013, requests imodium.     C. Diff Testing Algorithm (MUST be marked YES)   3 or more loose stools in 24 hrs. [x] Yes [] No       Additional symptoms:(At least ONE must be marked yes)   Abdominal pain/discomfort [] Yes [x] No   Fever at least 38C (100.4 F) [] Yes [x] No   Elevated WBC(>11,000) [] Yes [x] No       Exclusion Criteria:  (MUST be marked YES)   Off laxatives for at least 48 hrs. [x] Yes [] No       Pt status:    Temp:  [96.3  F (35.7  C)-97.4  F (36.3  C)] 97.4  F (36.3  C)  Pulse:  [74-94] 90  Resp:  [14-34] 24  BP: ()/(58-82) 128/78  SpO2:  [91 %-100 %] 93 %

## 2024-10-26 NOTE — PROGRESS NOTES
Patient personal belongings [facility] is [facility] include: Bilateral hearings aides with case and , phone, glasses and two stockinet for BLE amputees. Including general clothes.

## 2024-10-27 ENCOUNTER — APPOINTMENT (OUTPATIENT)
Dept: GENERAL RADIOLOGY | Facility: CLINIC | Age: 69
DRG: 871 | End: 2024-10-27
Payer: MEDICARE

## 2024-10-27 LAB
ALBUMIN SERPL BCG-MCNC: 2.7 G/DL (ref 3.5–5.2)
ALP SERPL-CCNC: 110 U/L (ref 40–150)
ALT SERPL W P-5'-P-CCNC: 24 U/L (ref 0–70)
ANION GAP SERPL CALCULATED.3IONS-SCNC: 11 MMOL/L (ref 7–15)
APTT PPP: 41 SECONDS (ref 22–38)
AST SERPL W P-5'-P-CCNC: 45 U/L (ref 0–45)
BACTERIA UR CULT: NO GROWTH
BASE EXCESS BLDV CALC-SCNC: 2.5 MMOL/L (ref -3–3)
BASOPHILS # BLD MANUAL: 0 10E3/UL (ref 0–0.2)
BASOPHILS NFR BLD MANUAL: 0 %
BILIRUB SERPL-MCNC: 0.5 MG/DL
BUN SERPL-MCNC: 36.3 MG/DL (ref 8–23)
BURR CELLS BLD QL SMEAR: SLIGHT
CALCIUM SERPL-MCNC: 7.4 MG/DL (ref 8.8–10.4)
CHLORIDE SERPL-SCNC: 106 MMOL/L (ref 98–107)
CREAT SERPL-MCNC: 1 MG/DL (ref 0.67–1.17)
CRP SERPL-MCNC: 217 MG/L
D DIMER PPP FEU-MCNC: 1.19 UG/ML FEU (ref 0–0.5)
EBV DNA SERPL NAA+PROBE-ACNC: NOT DETECTED IU/ML
EGFRCR SERPLBLD CKD-EPI 2021: 80 ML/MIN/1.73M2
EOSINOPHIL # BLD MANUAL: 0 10E3/UL (ref 0–0.7)
EOSINOPHIL NFR BLD MANUAL: 0 %
ERYTHROCYTE [DISTWIDTH] IN BLOOD BY AUTOMATED COUNT: 16.4 % (ref 10–15)
ERYTHROCYTE [DISTWIDTH] IN BLOOD BY AUTOMATED COUNT: 16.4 % (ref 10–15)
FIBRINOGEN PPP-MCNC: 399 MG/DL (ref 170–510)
GLUCOSE BLDC GLUCOMTR-MCNC: 113 MG/DL (ref 70–99)
GLUCOSE BLDC GLUCOMTR-MCNC: 146 MG/DL (ref 70–99)
GLUCOSE BLDC GLUCOMTR-MCNC: 166 MG/DL (ref 70–99)
GLUCOSE BLDC GLUCOMTR-MCNC: 168 MG/DL (ref 70–99)
GLUCOSE BLDC GLUCOMTR-MCNC: 176 MG/DL (ref 70–99)
GLUCOSE SERPL-MCNC: 150 MG/DL (ref 70–99)
HCO3 BLDV-SCNC: 26 MMOL/L (ref 21–28)
HCO3 SERPL-SCNC: 23 MMOL/L (ref 22–29)
HCT VFR BLD AUTO: 26.5 % (ref 40–53)
HCT VFR BLD AUTO: 27.1 % (ref 40–53)
HGB BLD-MCNC: 8.2 G/DL (ref 13.3–17.7)
HGB BLD-MCNC: 8.4 G/DL (ref 13.3–17.7)
INR PPP: 1.59 (ref 0.85–1.15)
LACTATE SERPL-SCNC: 2.4 MMOL/L (ref 0.7–2)
LYMPHOCYTES # BLD MANUAL: 0.5 10E3/UL (ref 0.8–5.3)
LYMPHOCYTES NFR BLD MANUAL: 13 %
MAGNESIUM SERPL-MCNC: 2.6 MG/DL (ref 1.7–2.3)
MCH RBC QN AUTO: 30.9 PG (ref 26.5–33)
MCH RBC QN AUTO: 30.9 PG (ref 26.5–33)
MCHC RBC AUTO-ENTMCNC: 30.9 G/DL (ref 31.5–36.5)
MCHC RBC AUTO-ENTMCNC: 31 G/DL (ref 31.5–36.5)
MCV RBC AUTO: 100 FL (ref 78–100)
MCV RBC AUTO: 100 FL (ref 78–100)
MONOCYTES # BLD MANUAL: 0.3 10E3/UL (ref 0–1.3)
MONOCYTES NFR BLD MANUAL: 7 %
NEUTROPHILS # BLD MANUAL: 3.1 10E3/UL (ref 1.6–8.3)
NEUTROPHILS NFR BLD MANUAL: 80 %
O2/TOTAL GAS SETTING VFR VENT: 2 %
OXYHGB MFR BLDV: 91 % (ref 70–75)
PCO2 BLDV: 36 MM HG (ref 40–50)
PH BLDV: 7.47 [PH] (ref 7.32–7.43)
PHOSPHATE SERPL-MCNC: 3.3 MG/DL (ref 2.5–4.5)
PLAT MORPH BLD: ABNORMAL
PLATELET # BLD AUTO: 141 10E3/UL (ref 150–450)
PLATELET # BLD AUTO: 146 10E3/UL (ref 150–450)
PO2 BLDV: 70 MM HG (ref 25–47)
POTASSIUM SERPL-SCNC: 3.7 MMOL/L (ref 3.4–5.3)
PROCALCITONIN SERPL IA-MCNC: 24 NG/ML
PROT SERPL-MCNC: 4.8 G/DL (ref 6.4–8.3)
PTH-INTACT SERPL-MCNC: 99 PG/ML (ref 15–65)
RBC # BLD AUTO: 2.65 10E6/UL (ref 4.4–5.9)
RBC # BLD AUTO: 2.72 10E6/UL (ref 4.4–5.9)
RBC MORPH BLD: ABNORMAL
RETICS # AUTO: 0.05 10E6/UL (ref 0.03–0.1)
RETICS/RBC NFR AUTO: 1.7 % (ref 0.5–2)
SAO2 % BLDV: 93.6 % (ref 70–75)
SODIUM SERPL-SCNC: 140 MMOL/L (ref 135–145)
TACROLIMUS BLD-MCNC: 17.3 UG/L (ref 5–15)
TME LAST DOSE: ABNORMAL H
TME LAST DOSE: ABNORMAL H
TOXIC GRANULES BLD QL SMEAR: PRESENT
VARIANT LYMPHS BLD QL SMEAR: PRESENT
VIT D+METAB SERPL-MCNC: 23 NG/ML (ref 20–50)
WBC # BLD AUTO: 3.8 10E3/UL (ref 4–11)
WBC # BLD AUTO: 3.9 10E3/UL (ref 4–11)

## 2024-10-27 PROCEDURE — 999N000157 HC STATISTIC RCP TIME EA 10 MIN

## 2024-10-27 PROCEDURE — 94640 AIRWAY INHALATION TREATMENT: CPT | Mod: 76

## 2024-10-27 PROCEDURE — 82947 ASSAY GLUCOSE BLOOD QUANT: CPT

## 2024-10-27 PROCEDURE — 250N000011 HC RX IP 250 OP 636: Performed by: STUDENT IN AN ORGANIZED HEALTH CARE EDUCATION/TRAINING PROGRAM

## 2024-10-27 PROCEDURE — 85610 PROTHROMBIN TIME: CPT

## 2024-10-27 PROCEDURE — 83605 ASSAY OF LACTIC ACID: CPT | Performed by: FAMILY MEDICINE

## 2024-10-27 PROCEDURE — 36415 COLL VENOUS BLD VENIPUNCTURE: CPT | Performed by: FAMILY MEDICINE

## 2024-10-27 PROCEDURE — 86832 HLA CLASS I HIGH DEFIN QUAL: CPT | Performed by: PHYSICIAN ASSISTANT

## 2024-10-27 PROCEDURE — 82310 ASSAY OF CALCIUM: CPT

## 2024-10-27 PROCEDURE — 80197 ASSAY OF TACROLIMUS: CPT | Performed by: STUDENT IN AN ORGANIZED HEALTH CARE EDUCATION/TRAINING PROGRAM

## 2024-10-27 PROCEDURE — 250N000011 HC RX IP 250 OP 636

## 2024-10-27 PROCEDURE — 83735 ASSAY OF MAGNESIUM: CPT

## 2024-10-27 PROCEDURE — 84100 ASSAY OF PHOSPHORUS: CPT | Performed by: FAMILY MEDICINE

## 2024-10-27 PROCEDURE — 85018 HEMOGLOBIN: CPT

## 2024-10-27 PROCEDURE — 99223 1ST HOSP IP/OBS HIGH 75: CPT | Mod: 24 | Performed by: PHYSICIAN ASSISTANT

## 2024-10-27 PROCEDURE — 71045 X-RAY EXAM CHEST 1 VIEW: CPT

## 2024-10-27 PROCEDURE — 85384 FIBRINOGEN ACTIVITY: CPT | Performed by: STUDENT IN AN ORGANIZED HEALTH CARE EDUCATION/TRAINING PROGRAM

## 2024-10-27 PROCEDURE — 250N000013 HC RX MED GY IP 250 OP 250 PS 637

## 2024-10-27 PROCEDURE — 82784 ASSAY IGA/IGD/IGG/IGM EACH: CPT | Performed by: PHYSICIAN ASSISTANT

## 2024-10-27 PROCEDURE — 36415 COLL VENOUS BLD VENIPUNCTURE: CPT

## 2024-10-27 PROCEDURE — 85048 AUTOMATED LEUKOCYTE COUNT: CPT

## 2024-10-27 PROCEDURE — 83970 ASSAY OF PARATHORMONE: CPT

## 2024-10-27 PROCEDURE — 120N000003 HC R&B IMCU UMMC

## 2024-10-27 PROCEDURE — 82306 VITAMIN D 25 HYDROXY: CPT

## 2024-10-27 PROCEDURE — 999N000248 HC STATISTIC IV INSERT WITH US BY RN

## 2024-10-27 PROCEDURE — 36415 COLL VENOUS BLD VENIPUNCTURE: CPT | Performed by: PHYSICIAN ASSISTANT

## 2024-10-27 PROCEDURE — 99233 SBSQ HOSP IP/OBS HIGH 50: CPT | Mod: GC | Performed by: STUDENT IN AN ORGANIZED HEALTH CARE EDUCATION/TRAINING PROGRAM

## 2024-10-27 PROCEDURE — 250N000009 HC RX 250: Performed by: FAMILY MEDICINE

## 2024-10-27 PROCEDURE — 85379 FIBRIN DEGRADATION QUANT: CPT | Performed by: STUDENT IN AN ORGANIZED HEALTH CARE EDUCATION/TRAINING PROGRAM

## 2024-10-27 PROCEDURE — 82805 BLOOD GASES W/O2 SATURATION: CPT | Performed by: FAMILY MEDICINE

## 2024-10-27 PROCEDURE — 84295 ASSAY OF SERUM SODIUM: CPT

## 2024-10-27 PROCEDURE — 250N000013 HC RX MED GY IP 250 OP 250 PS 637: Performed by: PHYSICIAN ASSISTANT

## 2024-10-27 PROCEDURE — 85730 THROMBOPLASTIN TIME PARTIAL: CPT

## 2024-10-27 PROCEDURE — 99223 1ST HOSP IP/OBS HIGH 75: CPT | Mod: 24 | Performed by: INTERNAL MEDICINE

## 2024-10-27 PROCEDURE — 99418 PROLNG IP/OBS E/M EA 15 MIN: CPT | Performed by: INTERNAL MEDICINE

## 2024-10-27 PROCEDURE — 71045 X-RAY EXAM CHEST 1 VIEW: CPT | Mod: 26 | Performed by: RADIOLOGY

## 2024-10-27 PROCEDURE — 86140 C-REACTIVE PROTEIN: CPT | Performed by: FAMILY MEDICINE

## 2024-10-27 PROCEDURE — 86833 HLA CLASS II HIGH DEFIN QUAL: CPT | Performed by: PHYSICIAN ASSISTANT

## 2024-10-27 PROCEDURE — 250N000011 HC RX IP 250 OP 636: Performed by: FAMILY MEDICINE

## 2024-10-27 PROCEDURE — 84145 PROCALCITONIN (PCT): CPT | Performed by: FAMILY MEDICINE

## 2024-10-27 PROCEDURE — 87799 DETECT AGENT NOS DNA QUANT: CPT | Performed by: PHYSICIAN ASSISTANT

## 2024-10-27 PROCEDURE — 250N000012 HC RX MED GY IP 250 OP 636 PS 637

## 2024-10-27 RX ORDER — CARVEDILOL 6.25 MG/1
6.25 TABLET ORAL 2 TIMES DAILY WITH MEALS
Status: DISCONTINUED | OUTPATIENT
Start: 2024-10-27 | End: 2024-10-27

## 2024-10-27 RX ORDER — NALOXONE HYDROCHLORIDE 0.4 MG/ML
0.2 INJECTION, SOLUTION INTRAMUSCULAR; INTRAVENOUS; SUBCUTANEOUS
Status: DISCONTINUED | OUTPATIENT
Start: 2024-10-27 | End: 2024-10-27

## 2024-10-27 RX ORDER — ONDANSETRON 4 MG/1
4 TABLET, ORALLY DISINTEGRATING ORAL EVERY 6 HOURS PRN
Status: DISCONTINUED | OUTPATIENT
Start: 2024-10-27 | End: 2024-10-27

## 2024-10-27 RX ORDER — FUROSEMIDE 20 MG/1
20 TABLET ORAL DAILY
Status: DISCONTINUED | OUTPATIENT
Start: 2024-10-27 | End: 2024-11-05 | Stop reason: HOSPADM

## 2024-10-27 RX ORDER — PANTOPRAZOLE SODIUM 40 MG/1
40 TABLET, DELAYED RELEASE ORAL DAILY
Status: DISCONTINUED | OUTPATIENT
Start: 2024-10-27 | End: 2024-10-27

## 2024-10-27 RX ORDER — ROSUVASTATIN CALCIUM 20 MG/1
20 TABLET, COATED ORAL
Status: DISCONTINUED | OUTPATIENT
Start: 2024-10-28 | End: 2024-10-27

## 2024-10-27 RX ORDER — AMOXICILLIN 250 MG
1 CAPSULE ORAL 2 TIMES DAILY PRN
Status: DISCONTINUED | OUTPATIENT
Start: 2024-10-27 | End: 2024-10-27

## 2024-10-27 RX ORDER — DAPSONE 25 MG/1
50 TABLET ORAL DAILY
Status: DISCONTINUED | OUTPATIENT
Start: 2024-10-27 | End: 2024-10-27

## 2024-10-27 RX ORDER — ASPIRIN 81 MG/1
81 TABLET ORAL DAILY
Status: DISCONTINUED | OUTPATIENT
Start: 2024-10-27 | End: 2024-10-27

## 2024-10-27 RX ORDER — FLUDROCORTISONE ACETATE 0.1 MG/1
0.1 TABLET ORAL DAILY
Status: DISCONTINUED | OUTPATIENT
Start: 2024-10-27 | End: 2024-10-27

## 2024-10-27 RX ORDER — AMOXICILLIN 250 MG
2 CAPSULE ORAL 2 TIMES DAILY PRN
Status: DISCONTINUED | OUTPATIENT
Start: 2024-10-27 | End: 2024-10-27

## 2024-10-27 RX ORDER — POTASSIUM CHLORIDE 750 MG/1
40 TABLET, EXTENDED RELEASE ORAL ONCE
Status: COMPLETED | OUTPATIENT
Start: 2024-10-27 | End: 2024-10-27

## 2024-10-27 RX ORDER — NALOXONE HYDROCHLORIDE 0.4 MG/ML
0.4 INJECTION, SOLUTION INTRAMUSCULAR; INTRAVENOUS; SUBCUTANEOUS
Status: DISCONTINUED | OUTPATIENT
Start: 2024-10-27 | End: 2024-10-27

## 2024-10-27 RX ORDER — VANCOMYCIN HYDROCHLORIDE 1 G/200ML
1000 INJECTION, SOLUTION INTRAVENOUS
Status: DISCONTINUED | OUTPATIENT
Start: 2024-10-27 | End: 2024-10-27

## 2024-10-27 RX ORDER — CALCIUM CARBONATE 500 MG/1
1000 TABLET, CHEWABLE ORAL 4 TIMES DAILY PRN
Status: DISCONTINUED | OUTPATIENT
Start: 2024-10-27 | End: 2024-10-27

## 2024-10-27 RX ORDER — ACYCLOVIR 200 MG/1
400 CAPSULE ORAL 2 TIMES DAILY
Status: DISCONTINUED | OUTPATIENT
Start: 2024-10-27 | End: 2024-10-27

## 2024-10-27 RX ORDER — VENLAFAXINE HYDROCHLORIDE 37.5 MG/1
37.5 CAPSULE, EXTENDED RELEASE ORAL DAILY
Status: DISCONTINUED | OUTPATIENT
Start: 2024-10-27 | End: 2024-10-27

## 2024-10-27 RX ORDER — ACETAMINOPHEN 325 MG/1
650 TABLET ORAL EVERY 4 HOURS PRN
Status: DISCONTINUED | OUTPATIENT
Start: 2024-10-27 | End: 2024-10-27

## 2024-10-27 RX ORDER — MINOCYCLINE HYDROCHLORIDE 100 MG/1
100 CAPSULE ORAL EVERY 12 HOURS SCHEDULED
Status: DISCONTINUED | OUTPATIENT
Start: 2024-10-27 | End: 2024-10-27

## 2024-10-27 RX ORDER — PREDNISONE 2.5 MG/1
2.5 TABLET ORAL EVERY EVENING
Status: DISCONTINUED | OUTPATIENT
Start: 2024-10-27 | End: 2024-10-27

## 2024-10-27 RX ORDER — DEXTROSE MONOHYDRATE 25 G/50ML
25-50 INJECTION, SOLUTION INTRAVENOUS
Status: DISCONTINUED | OUTPATIENT
Start: 2024-10-27 | End: 2024-10-27

## 2024-10-27 RX ORDER — NICOTINE POLACRILEX 4 MG
15-30 LOZENGE BUCCAL
Status: DISCONTINUED | OUTPATIENT
Start: 2024-10-27 | End: 2024-10-27

## 2024-10-27 RX ORDER — LOPERAMIDE HYDROCHLORIDE 2 MG/1
4 CAPSULE ORAL 4 TIMES DAILY PRN
Status: DISCONTINUED | OUTPATIENT
Start: 2024-10-27 | End: 2024-10-27

## 2024-10-27 RX ORDER — MONTELUKAST SODIUM 10 MG/1
10 TABLET ORAL EVERY EVENING
Status: DISCONTINUED | OUTPATIENT
Start: 2024-10-27 | End: 2024-10-27

## 2024-10-27 RX ORDER — PREDNISONE 5 MG/1
5 TABLET ORAL EVERY MORNING
Status: DISCONTINUED | OUTPATIENT
Start: 2024-10-27 | End: 2024-10-27

## 2024-10-27 RX ORDER — AZITHROMYCIN 250 MG/1
250 TABLET, FILM COATED ORAL
Status: DISCONTINUED | OUTPATIENT
Start: 2024-10-28 | End: 2024-11-05 | Stop reason: HOSPADM

## 2024-10-27 RX ORDER — LIDOCAINE 40 MG/G
CREAM TOPICAL
Status: ACTIVE | OUTPATIENT
Start: 2024-10-27 | End: 2024-10-30

## 2024-10-27 RX ORDER — ONDANSETRON 2 MG/ML
4 INJECTION INTRAMUSCULAR; INTRAVENOUS EVERY 6 HOURS PRN
Status: DISCONTINUED | OUTPATIENT
Start: 2024-10-27 | End: 2024-10-27

## 2024-10-27 RX ORDER — FUROSEMIDE 20 MG/1
20 TABLET ORAL DAILY
Status: DISCONTINUED | OUTPATIENT
Start: 2024-10-27 | End: 2024-10-27

## 2024-10-27 RX ORDER — HYDROCORTISONE SODIUM SUCCINATE 100 MG/2ML
50 INJECTION INTRAMUSCULAR; INTRAVENOUS EVERY 6 HOURS
Status: DISCONTINUED | OUTPATIENT
Start: 2024-10-27 | End: 2024-10-27

## 2024-10-27 RX ORDER — DOXYCYCLINE 100 MG/10ML
100 INJECTION, POWDER, LYOPHILIZED, FOR SOLUTION INTRAVENOUS EVERY 12 HOURS
Status: DISCONTINUED | OUTPATIENT
Start: 2024-10-27 | End: 2024-10-27

## 2024-10-27 RX ORDER — IPRATROPIUM BROMIDE AND ALBUTEROL SULFATE 2.5; .5 MG/3ML; MG/3ML
3 SOLUTION RESPIRATORY (INHALATION)
Status: DISCONTINUED | OUTPATIENT
Start: 2024-10-27 | End: 2024-10-28

## 2024-10-27 RX ORDER — MINOCYCLINE HYDROCHLORIDE 100 MG/1
100 CAPSULE ORAL EVERY 12 HOURS SCHEDULED
Status: DISCONTINUED | OUTPATIENT
Start: 2024-10-27 | End: 2024-10-28

## 2024-10-27 RX ORDER — TACROLIMUS 1 MG/1
3 CAPSULE ORAL 2 TIMES DAILY
Status: DISCONTINUED | OUTPATIENT
Start: 2024-10-27 | End: 2024-10-27

## 2024-10-27 RX ORDER — ACETAMINOPHEN 650 MG/1
650 SUPPOSITORY RECTAL EVERY 4 HOURS PRN
Status: DISCONTINUED | OUTPATIENT
Start: 2024-10-27 | End: 2024-10-27

## 2024-10-27 RX ORDER — CEFTRIAXONE 1 G/1
1 INJECTION, POWDER, FOR SOLUTION INTRAMUSCULAR; INTRAVENOUS EVERY 24 HOURS
Status: DISCONTINUED | OUTPATIENT
Start: 2024-10-27 | End: 2024-10-29

## 2024-10-27 RX ORDER — LIDOCAINE 40 MG/G
CREAM TOPICAL
Status: DISCONTINUED | OUTPATIENT
Start: 2024-10-27 | End: 2024-10-27

## 2024-10-27 RX ADMIN — IPRATROPIUM BROMIDE AND ALBUTEROL SULFATE 3 ML: .5; 3 SOLUTION RESPIRATORY (INHALATION) at 13:28

## 2024-10-27 RX ADMIN — HYDROCORTISONE SODIUM SUCCINATE 50 MG: 100 INJECTION, POWDER, FOR SOLUTION INTRAMUSCULAR; INTRAVENOUS at 03:22

## 2024-10-27 RX ADMIN — LOPERAMIDE HYDROCHLORIDE 2 MG: 2 CAPSULE ORAL at 20:01

## 2024-10-27 RX ADMIN — PIPERACILLIN SODIUM AND TAZOBACTAM SODIUM 4.5 G: 4; .5 INJECTION, SOLUTION INTRAVENOUS at 09:44

## 2024-10-27 RX ADMIN — MONTELUKAST 10 MG: 10 TABLET, FILM COATED ORAL at 20:01

## 2024-10-27 RX ADMIN — FUROSEMIDE 20 MG: 20 TABLET ORAL at 13:09

## 2024-10-27 RX ADMIN — Medication 2 CAPSULE: at 09:45

## 2024-10-27 RX ADMIN — ACYCLOVIR 400 MG: 400 TABLET ORAL at 09:49

## 2024-10-27 RX ADMIN — HYDROCORTISONE SODIUM SUCCINATE 50 MG: 100 INJECTION, POWDER, FOR SOLUTION INTRAMUSCULAR; INTRAVENOUS at 09:49

## 2024-10-27 RX ADMIN — VANCOMYCIN HYDROCHLORIDE 1000 MG: 1 INJECTION, SOLUTION INTRAVENOUS at 05:46

## 2024-10-27 RX ADMIN — PREDNISONE 5 MG: 5 TABLET ORAL at 09:49

## 2024-10-27 RX ADMIN — DAPSONE 50 MG: 25 TABLET ORAL at 09:45

## 2024-10-27 RX ADMIN — ASPIRIN 81 MG: 81 TABLET ORAL at 09:49

## 2024-10-27 RX ADMIN — ACETAMINOPHEN 650 MG: 325 TABLET, FILM COATED ORAL at 10:12

## 2024-10-27 RX ADMIN — IPRATROPIUM BROMIDE AND ALBUTEROL SULFATE 3 ML: .5; 3 SOLUTION RESPIRATORY (INHALATION) at 16:40

## 2024-10-27 RX ADMIN — Medication 2 CAPSULE: at 20:01

## 2024-10-27 RX ADMIN — VENLAFAXINE HYDROCHLORIDE 37.5 MG: 37.5 CAPSULE, EXTENDED RELEASE ORAL at 09:49

## 2024-10-27 RX ADMIN — PREDNISONE 2.5 MG: 2.5 TABLET ORAL at 20:01

## 2024-10-27 RX ADMIN — THIAMINE HCL TAB 100 MG 100 MG: 100 TAB at 09:46

## 2024-10-27 RX ADMIN — ACETAMINOPHEN 650 MG: 325 TABLET, FILM COATED ORAL at 20:01

## 2024-10-27 RX ADMIN — Medication 60 ML: at 10:05

## 2024-10-27 RX ADMIN — FLUTICASONE FUROATE AND VILANTEROL TRIFENATATE 1 PUFF: 200; 25 POWDER RESPIRATORY (INHALATION) at 08:08

## 2024-10-27 RX ADMIN — ACETAMINOPHEN 650 MG: 325 TABLET, FILM COATED ORAL at 03:05

## 2024-10-27 RX ADMIN — LOPERAMIDE HYDROCHLORIDE 2 MG: 2 CAPSULE ORAL at 03:05

## 2024-10-27 RX ADMIN — APIXABAN 5 MG: 5 TABLET, FILM COATED ORAL at 09:49

## 2024-10-27 RX ADMIN — FLUDROCORTISONE ACETATE 0.1 MG: 0.1 TABLET ORAL at 09:49

## 2024-10-27 RX ADMIN — APIXABAN 5 MG: 5 TABLET, FILM COATED ORAL at 20:01

## 2024-10-27 RX ADMIN — IPRATROPIUM BROMIDE AND ALBUTEROL SULFATE 3 ML: .5; 3 SOLUTION RESPIRATORY (INHALATION) at 20:53

## 2024-10-27 RX ADMIN — POTASSIUM CHLORIDE 40 MEQ: 750 TABLET, EXTENDED RELEASE ORAL at 09:49

## 2024-10-27 RX ADMIN — CEFTRIAXONE SODIUM 1 G: 1 INJECTION, POWDER, FOR SOLUTION INTRAMUSCULAR; INTRAVENOUS at 20:48

## 2024-10-27 RX ADMIN — Medication 500 MG: at 21:35

## 2024-10-27 RX ADMIN — PIPERACILLIN SODIUM AND TAZOBACTAM SODIUM 4.5 G: 4; .5 INJECTION, SOLUTION INTRAVENOUS at 02:07

## 2024-10-27 RX ADMIN — PANTOPRAZOLE SODIUM 40 MG: 40 TABLET, DELAYED RELEASE ORAL at 09:49

## 2024-10-27 RX ADMIN — LOPERAMIDE HYDROCHLORIDE 2 MG: 2 CAPSULE ORAL at 10:13

## 2024-10-27 RX ADMIN — MINOCYCLINE HYDROCHLORIDE 100 MG: 100 CAPSULE ORAL at 20:01

## 2024-10-27 RX ADMIN — PIPERACILLIN SODIUM AND TAZOBACTAM SODIUM 4.5 G: 4; .5 INJECTION, SOLUTION INTRAVENOUS at 13:09

## 2024-10-27 NOTE — PROGRESS NOTES
Bedside RN noticed swelling on the right forearm where the new PIV was just inserted. Assessed both arms using ultrasound. Deep veins noted on left upper arm due to previous infiltration. One brachial vein noted on the right upper arm. Recommend PICC for access.

## 2024-10-27 NOTE — CONSULTS
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 68*   < > 96* 46*    < > = values in this interval not displayed.       Arterial Blood Gas Testing    Recent Labs   Lab Test 10/27/24  0533 10/26/24  0533 10/25/24  2344 10/25/24  1942 08/29/24  0400 08/28/24  1828   PH  --   --   --   --   --  7.39   PCO2  --   --   --   --   --  39   PO2  --   --   --   --   --  77*   HCO3  --   --   --   --   --  23   O2PER 2 0 28 28   < > 85    < > = values in this interval not displayed.        Urine Studies     Recent Labs   Lab Test 10/25/24  1250 08/29/24  1522 01/27/20  1336 02/24/19  1320 10/17/17  1330   URINEPH 5.0 5.0 5.0 5.0 5.5   NITRITE Negative Negative Negative Negative Negative   LEUKEST Trace* Negative Negative Negative Negative   WBCU 12* 1  --  0 - 5 15*       Vancomycin Levels     Recent Labs   Lab Test 10/26/24  1848 08/27/24  0522   VANCOMYCIN 22.1 7.7       Tobramycin levels     No lab results found.    Gentamicin levels    No lab results found.    Tacrolimus levels   "  Invalid input(s): \"TACROLIMUS\", \"TAC\", \"TACR\"      Latest Ref Rng & Units 10/27/2024     5:34 AM 10/26/2024    11:44 PM 10/26/2024     5:53 PM 10/26/2024     5:33 AM 10/25/2024     4:52 PM   Transplant Immunosuppression Labs   Creat 0.67 - 1.17 mg/dL 1.00   1.02  0.79  0.92    Urea Nitrogen 8.0 - 23.0 mg/dL 36.3   30.0  22.1  23.1    WBC 4.0 - 11.0 10e3/uL 3.8  3.9  3.9  3.2  2.6    Neutrophil %  80  74      ANEU 1.6 - 8.3 10e3/uL  3.1           Cyclosporine levels    Invalid input(s): \"CYCLOSPORINE\", \"CYC\"    Mycophenolate levels    Invalid input(s): \"MYPA\", \"MYP\"    Sirolimus levels    Invalid input(s): \"SIROLIMUS\", \"SIR\", \"RAPA\"    CSF testing   No lab results found.      Microbiology:  Blood cx negative at [place].   Uce with E coli and K pneumonia.   Last check of C difficile  C Diff Toxin B PCR   Date Value Ref Range Status   09/21/2014  NEG Final    Negative  Negative: Clostridium difficile target DNA sequences NOT detected, presumed   negative for Clostridium difficile toxin B or the number of bacteria present   may be below the limit of detection for the test.   FDA approved assay performed using [employer] GeneXpert real-time PCR.   A negative result does not exclude actual disease due to Clostridium difficile   and may be due to improper collection, handling and storage of the specimen or   the number of organisms in the specimen is below the detection limit of the   assay.         Virology:  CMV viral loads    CMV Quant IU/mL   Date Value Ref Range Status   06/03/2021 CMV DNA Not Detected CMVND^CMV DNA Not Detected [IU]/mL Final     Comment:     Mutations within the highly conserved regions of the viral genome covered by   the EMERALD AmpliPrep/EMERALD TaqMan CMV Test primers and/or probes have been   identified and may result in under-quantitation of or failure to detect the   virus.  Supplemental testing methods should be used for testing when this is   suspected.  The EMERALD AmpliPrep/EMERALD TaqMan CMV " Test is an FDA-approved in vitro nucleic   acid amplification test for the quantitation of cytomegalovirus DNA in human   plasma (EDTA plasma) using the EMERALD AmpliPrep Instrument for automated viral   nucleic acid extraction and the [employer] TaqMan Analyzer or [employer] TaqMan for   automated Real Time amplification and detection of the viral nucleic acid   target.  Titer results are reported in International Units/mL (IU/mL using 1st WHO   International standard for Human Cytomegalovirus for Nucleic Acid   Amplification based assays. The conversion factor between CMV DNA copis/mL (as   defined by the Roche EMERALD TaqMan CMV test) and International Units is the   CMV DNA concentration in IU/mL x 1.1 copies/IU = CMV DNA in copies/mL.  This assay has received FDA approval for the testing of human plasma only. The   [facility] Laboratory at the [facility], [place], has validated the performance characteristics of the   Roche CMV assay for plasma, bronchial alveolar lavage/wash and urine.     05/12/2021 CMV DNA Not Detected CMVND^CMV DNA Not Detected [IU]/mL Final     Comment:     Mutations within the highly conserved regions of the viral genome covered by   the EMERALD AmpliPrep/EMERALD TaqMan CMV Test primers and/or probes have been   identified and may result in under-quantitation of or failure to detect the   virus.  Supplemental testing methods should be used for testing when this is   suspected.  The EMERALD AmpliPrep/EMERALD TaqMan CMV Test is an FDA-approved in vitro nucleic   acid amplification test for the quantitation of cytomegalovirus DNA in human   plasma (EDTA plasma) using the EMERALD AmpliPrep Instrument for automated viral   nucleic acid extraction and the EMERALD TaqMan Analyzer or EMERALD TaqMan for   automated Real Time amplification and detection of the viral nucleic acid   target.  Titer results are reported in International Units/mL (IU/mL using 1st WHO    International standard for Human Cytomegalovirus for Nucleic Acid   Amplification based assays. The conversion factor between CMV DNA copis/mL (as   defined by the Roche EMERALD TaqMan CMV test) and International Units is the   CMV DNA concentration in IU/mL x 1.1 copies/IU = CMV DNA in copies/mL.  This assay has received FDA approval for the testing of human plasma only. The   Infect[facility] Disease Diagnostic Laboratory at the [facility], [place], has validated the performance characteristics of the   Roche CMV assay for plasma, bronchial alveolar lavage/wash and urine.       CMV DNA IU/mL   Date Value Ref Range Status   10/25/2024 Not Detected Not Detected IU/mL Final   07/12/2024 Not Detected Not Detected IU/mL Final   07/19/2023 Not Detected Not Detected IU/mL Final   07/13/2023 Not Detected Not Detected IU/mL Final     CMV DNA Quant (External)   Date Value Ref Range Status   09/29/2014 5,500 (A) Undetected IU/mL Final     Viral loads    Recent Labs   Lab Test 10/26/24  1824 10/25/24  1652 08/29/24  1959 07/12/24  1206 12/04/23  0859 11/17/23  1038 06/16/23  0817 05/25/23  0852 11/11/21  1247 06/03/21  0919   EBQI  --   --   --  Not Detected   < >  --   --   --   --   --    EBVRESINST  --   --   --   --   --  1,575*  --  8,806*   < >  --    CMVQNT  --  Not Detected  --  Not Detected   < > Not Detected   < > Not Detected   < > CMV DNA Not Detected   CMVLOG  --   --   --   --   --   --   --   --   --  Not Calculated   ADENOV Not Detected  --    < >  --   --   --   --   --   --   --    CSPEC  --   --   --   --   --   --   --   --   --  Plasma    < > = values in this interval not displayed.       CMV viral loads    CMV Quant IU/mL   Date Value Ref Range Status   06/03/2021 CMV DNA Not Detected CMVND^CMV DNA Not Detected [IU]/mL Final     Comment:     Mutations within the highly conserved regions of the viral genome covered by   the EMERALD AmpliPrep/EMERALD TaqMan CMV Test primers and/or  probes have been   identified and may result in under-quantitation of or failure to detect the   virus.  Supplemental testing methods should be used for testing when this is   suspected.  The EMERALD AmpliPrep/EMERALD TaqMan CMV Test is an FDA-approved in vitro nucleic   acid amplification test for the quantitation of cytomegalovirus DNA in human   plasma (EDTA plasma) using the VideostiriPrep Instrument for automated viral   nucleic acid extraction and the [employer] TaqMan Analyzer or [employer] for   automated Real Time amplification and detection of the viral nucleic acid   target.  Titer results are reported in International Units/mL (IU/mL using 1st WHO   International standard for Human Cytomegalovirus for Nucleic Acid   Amplification based assays. The conversion factor between CMV DNA copis/mL (as   defined by the Roche EMERALD TaqMan CMV test) and International Units is the   CMV DNA concentration in IU/mL x 1.1 copies/IU = CMV DNA in copies/mL.  This assay has received FDA approval for the testing of human plasma only. The   Infectious Disease Diagnostic Laboratory at the [facility], [place], has validated the performance characteristics of the   Roche CMV assay for plasma, bronchial alveolar lavage/wash and urine.     05/12/2021 CMV DNA Not Detected CMVND^CMV DNA Not Detected [IU]/mL Final     Comment:     Mutations within the highly conserved regions of the viral genome covered by   the EMERALD AmpliPrep/EMERALD TaqMan CMV Test primers and/or probes have been   identified and may result in under-quantitation of or failure to detect the   virus.  Supplemental testing methods should be used for testing when this is   suspected.  The EMERALD AmpliPrep/EMERALD TaqMan CMV Test is an FDA-approved in vitro nucleic   acid amplification test for the quantitation of cytomegalovirus DNA in human   plasma (EDTA plasma) using the EMERALD SpaciousiPrep Instrument for automated viral   nucleic acid extraction  and the EMERALD TaqMan Analyzer or EMERALD TaqMan for   automated Real Time amplification and detection of the viral nucleic acid   target.  Titer results are reported in International Units/mL (IU/mL using 1st WHO   International standard for Human Cytomegalovirus for Nucleic Acid   Amplification based assays. The conversion factor between CMV DNA copis/mL (as   defined by the Roche EMERALD TaqMan CMV test) and International Units is the   CMV DNA concentration in IU/mL x 1.1 copies/IU = CMV DNA in copies/mL.  This assay has received FDA approval for the testing of human plasma only. The   Infectious Disease Diagnostic Laboratory at the [facility], [place], has validated the performance characteristics of the   Roche CMV assay for plasma, bronchial alveolar lavage/wash and urine.     02/19/2020 CMV DNA Not Detected CMVND^CMV DNA Not Detected [IU]/mL Final     Comment:     Mutations within the highly conserved regions of the viral genome covered by   the EMERALD AmpliPrep/EMERALD TaqMan CMV Test primers and/or probes have been   identified and may result in under-quantitation of or failure to detect the   virus.  Supplemental testing methods should be used for testing when this is   suspected.  The EMERALD AmpliPrep/EMERALD TaqMan CMV Test is an FDA-approved in vitro nucleic   acid amplification test for the quantitation of cytomegalovirus DNA in human   plasma (EDTA plasma) using the EMERALD AmpliPrep Instrument for automated viral   nucleic acid extraction and the EMERALD TaqMan Analyzer or EMERALD TaqMan for   automated Real Time amplification and detection of the viral nucleic acid   target.  Titer results are reported in International Units/mL (IU/mL using 1st WHO   International standard for Human Cytomegalovirus for Nucleic Acid   Amplification based assays. The conversion factor between CMV DNA copis/mL (as   defined by the Roche EMERALD TaqMan CMV test) and International Units is the   CMV DNA  concentration in IU/mL x 1.1 copies/IU = CMV DNA in copies/mL.  This assay has received FDA approval for the testing of human plasma only. The   Infectious Disease Diagnostic Laboratory at the [facility], [place], has validated the performance characteristics of the   Roche CMV assay for plasma, bronchial alveolar lavage/wash and urine.     11/04/2019 CMV DNA Not Detected CMVND^CMV DNA Not Detected [IU]/mL Final     Comment:     Mutations within the highly conserved regions of the viral genome covered by   the EMERALD AmpliPrep/EMERALD TaqMan CMV Test primers and/or probes have been   identified and may result in under-quantitation of or failure to detect the   virus.  Supplemental testing methods should be used for testing when this is   suspected.  The EMERALD AmpliPrep/EMERALD TaqMan CMV Test is an FDA-approved in vitro nucleic   acid amplification test for the quantitation of cytomegalovirus DNA in human   plasma (EDTA plasma) using the EMERALD AmpliPrep Instrument for automated viral   nucleic acid extraction and the [employer] TaqMan Analyzer or [employer] for   automated Real Time amplification and detection of the viral nucleic acid   target.  Titer results are reported in International Units/mL (IU/mL using 1st WHO   International standard for Human Cytomegalovirus for Nucleic Acid   Amplification based assays. The conversion factor between CMV DNA copis/mL (as   defined by the Roche EMERALD TaqMan CMV test) and International Units is the   CMV DNA concentration in IU/mL x 1.1 copies/IU = CMV DNA in copies/mL.  This assay has received FDA approval for the testing of human plasma only. The   Infectious Disease Diagnostic Laboratory at the [facility], [place], has validated the performance characteristics of the   Roche CMV assay for plasma, bronchial alveolar lavage/wash and urine.     10/24/2019 CMV DNA Not Detected CMVND^CMV DNA Not Detected [IU]/mL Final      Comment:     Mutations within the highly conserved regions of the viral genome covered by   the EMERALD AmpliPrep/EMERALD TaqMan CMV Test primers and/or probes have been   identified and may result in under-quantitation of or failure to detect the   virus.  Supplemental testing methods should be used for testing when this is   suspected.  The EMERALD AmpliPrep/EMERALD TaqMan CMV Test is an FDA-approved in vitro nucleic   acid amplification test for the quantitation of cytomegalovirus DNA in human   plasma (EDTA plasma) using the EMERALD AmpliPrep Instrument for automated viral   nucleic acid extraction and the [employer] TaqMan Analyzer or [employer] for   automated Real Time amplification and detection of the viral nucleic acid   target.  Titer results are reported in International Units/mL (IU/mL using 1st WHO   International standard for Human Cytomegalovirus for Nucleic Acid   Amplification based assays. The conversion factor between CMV DNA copis/mL (as   defined by the Roche EMERALD TaqMan CMV test) and International Units is the   CMV DNA concentration in IU/mL x 1.1 copies/IU = CMV DNA in copies/mL.  This assay has received FDA approval for the testing of human plasma only. The   Infectious Disease Diagnostic Laboratory at the [facility], [place], has validated the performance characteristics of the   Roche CMV assay for plasma, bronchial alveolar lavage/wash and urine.     03/14/2019 CMV DNA Not Detected CMVND^CMV DNA Not Detected [IU]/mL Final     Comment:     Mutations within the highly conserved regions of the viral genome covered by   the EMERALD AmpliPrep/EMERALD TaqMan CMV Test primers and/or probes have been   identified and may result in under-quantitation of or failure to detect the   virus.  Supplemental testing methods should be used for testing when this is   suspected.  The EMERALD AmpliPrep/EMERALD TaqMan CMV Test is an FDA-approved in vitro nucleic   acid amplification test for the  quantitation of cytomegalovirus DNA in human   plasma (EDTA plasma) using the EMERALD AmpliPrep Instrument for automated viral   nucleic acid extraction and the EMERALD TaqMan Analyzer or [employer] TaqMan for   automated Real Time amplification and detection of the viral nucleic acid   target.  Titer results are reported in International Units/mL (IU/mL using 1st WHO   International standard for Human Cytomegalovirus for Nucleic Acid   Amplification based assays. The conversion factor between CMV DNA copis/mL (as   defined by the Roche EMERALD TaqMan CMV test) and International Units is the   CMV DNA concentration in IU/mL x 1.1 copies/IU = CMV DNA in copies/mL.  This assay has received FDA approval for the testing of human plasma only. The   Infectious Disease Diagnostic Laboratory at the [facility], [place], has validated the performance characteristics of the   Roche CMV assay for plasma, bronchial alveolar lavage/wash and urine.     02/22/2018 CMV DNA Not Detected CMVND^CMV DNA Not Detected [IU]/mL Final     Comment:     Mutations within the highly conserved regions of the viral genome covered by   the EMERALD AmpliPrep/EMERALD TaqMan CMV Test primers and/or probes have been   identified and may result in under-quantitation of or failure to detect the   virus.  Supplemental testing methods should be used for testing when this is   suspected.  The EMERALD AmpliPrep/EMERALD TaqMan CMV Test is an FDA-approved in vitro nucleic   acid amplification test for the quantitation of cytomegalovirus DNA in human   plasma (EDTA plasma) using the EMERALD AmpliPrep Instrument for automated viral   nucleic acid extraction and the EMERALD TaqMan Analyzer or EMERALD TaqMan for   automated Real Time amplification and detection of the viral nucleic acid   target.  Titer results are reported in International Units/mL (IU/mL using 1st WHO   International standard for Human Cytomegalovirus for Nucleic Acid   Amplification based  assays. The conversion factor between CMV DNA copis/mL (as   defined by the Roche EMERALD TaqMan CMV test) and International Units is the   CMV DNA concentration in IU/mL x 1.1 copies/IU = CMV DNA in copies/mL.  This assay has received FDA approval for the testing of human plasma only. The   Infectious Disease Diagnostic Laboratory at the [facility], [place], has validated the performance characteristics of the   Roche CMV assay for plasma, bronchial alveolar lavage/wash and urine.     04/19/2017  CMVND [IU]/mL Final    CMV DNA Not Detected   The EMERALD AmpliPrep/EMERALD TaqMan CMV Test is an FDA-approved in vitro nucleic   acid amplification test for the quantitation of cytomegalovirus DNA in human   plasma (EDTA plasma) using the PropanciPrep Instrument for automated viral   nucleic acid extraction and the [employer] Analyzer or [employer] for   automated Real Time amplification and detection of the viral nucleic acid   target.   Titer results are reported in International Units/mL (IU/mL using 1st WHO   International standard for Human Cytomegalovirus for Nucleic Acid Amplification   based assays. The conversion factor between CMV DNA copis/mL (as defined by the   Roche EMERALD TaqMan CMV test) and International Units is the CMV DNA   concentration in IU/mL x 1.1 copies/IU = CMV DNA in copies/mL.   This assay has received FDA approval for the testing of human plasma only. The   Infect[facility] Disease Diagnostic Laboratory at the [facility], [place], has validated the performance characteristics of the Roche   CMV assay for plasma, bronchial alveolar lavage/wash and urine.     10/19/2016  CMVND [IU]/mL Final    CMV DNA Not Detected   The EMERALD AmpliPrep/EMERALD TaqMan CMV Test is an FDA-approved in vitro nucleic   acid amplification test for the quantitation of cytomegalovirus DNA in human   plasma (EDTA plasma) using the EMERALD [employer] DiagnosticsiPrep Instrument for  automated viral   nucleic acid extraction and the EMERALD TaqMan Analyzer or EMERALD TaqMan for   automated Real Time amplification and detection of the viral nucleic acid   target.   Titer results are reported in International Units/mL (IU/mL using 1st WHO   International standard for Human Cytomegalovirus for Nucleic Acid Amplification   based assays. The conversion factor between CMV DNA copis/mL (as defined by the   Roche EMERALD TaqMan CMV test) and International Units is the CMV DNA   concentration in IU/mL x 1.1 copies/IU = CMV DNA in copies/mL.   This assay has received FDA approval for the testing of human plasma only. The   Infectious Disease Diagnostic Laboratory at the [facility], [place], has validated the performance characteristics of the Roche   CMV assay for plasma, bronchial alveolar lavage/wash and urine.       CMV DNA IU/mL   Date Value Ref Range Status   10/25/2024 Not Detected Not Detected IU/mL Final   07/12/2024 Not Detected Not Detected IU/mL Final   05/16/2024 Not Detected Not Detected IU/mL Final   05/15/2024 Not Detected Not Detected IU/mL Final   04/03/2024 Not Detected Not Detected IU/mL Final   03/28/2024 Not Detected Not Detected IU/mL Final   02/29/2024 Not Detected Not Detected IU/mL Final   01/11/2024 Not Detected Not Detected IU/mL Final   12/29/2023 Not Detected Not Detected IU/mL Final   07/19/2023 Not Detected Not Detected IU/mL Final   07/13/2023 Not Detected Not Detected IU/mL Final   06/28/2023 Not Detected Not Detected IU/mL Final   06/16/2023 Not Detected Not Detected IU/mL Final   05/25/2023 Not Detected Not Detected IU/mL Final   05/16/2023 Not Detected Not Detected IU/mL Final   02/08/2023 Not Detected Not Detected IU/mL Final   12/01/2022 Not Detected Not Detected IU/mL Final   11/17/2022 Not Detected Not Detected IU/mL Final     Log IU/mL of CMVQNT   Date Value Ref Range Status   06/03/2021 Not Calculated <2.1 [Log_IU]/mL Final   05/12/2021  Not Calculated <2.1 [Log_IU]/mL Final   02/19/2020 Not Calculated <2.1 [Log_IU]/mL Final   11/04/2019 Not Calculated <2.1 [Log_IU]/mL Final   10/24/2019 Not Calculated <2.1 [Log_IU]/mL Final   03/14/2019 Not Calculated <2.1 [Log_IU]/mL Final   02/22/2018 Not Calculated <2.1 [Log_IU]/mL Final   04/19/2017 Not Calculated <2.1 [Log_IU]/mL Final   10/19/2016 Not Calculated <2.1 [Log_IU]/mL Final   06/14/2016 Not Calculated <2.1 [Log_IU]/mL Final   02/08/2016 Not Calculated <2.1 [Log_IU]/mL Final   10/05/2015 Not Calculated <2.1 [Log_IU]/mL Final   07/06/2015 Not Calculated <2.1 [Log_IU]/mL Final   03/02/2015 Not Calculated <2.1 [Log_IU]/mL Final     CMV DNA Quant (External)   Date Value Ref Range Status   09/29/2014 5,500 (A) Undetected IU/mL Final       CMV resistance testing  No lab results found.  CMV Cidofovir Resist   Date Value Ref Range Status   10/06/2014 Sensitive  Unit: not reported    Final   09/23/2014 Sensitive  Unit: not reported    Final   10/14/2013 Sensitive  Unit: not reported  Final     CMV Foscarnet Resist   Date Value Ref Range Status   10/06/2014 Sensitive  Unit: not reported    Final   09/23/2014 Sensitive  Unit: not reported    Final   10/14/2013 Sensitive  Unit: not reported  Final     CMV Ganciclovir Resis   Date Value Ref Range Status   10/06/2014   Final    Sensitive  Unit: not reported  (Note)  INTERPRETIVE INFORMATION: CMV Antiviral Resistance  Codons 457-630 of the UL97 gene and codons 393-1000 of the  UL54 gene are sequenced. Mutations associated with  resistance to ganciclovir, cidofovir, and foscarnet are  reported. Mutations in viral sub-populations below 20  percent of total may not be detected.  Test developed and characteristics determined by [facility]. See Compliance Statement B: [email or web address]  Performed by [facility],  [address] [place] [address] [phone]  [email or web address], Jude Galvan MD, Lab. Director     09/23/2014   Final    Sensitive  Unit: not  "reported  (Note)  INTERPRETIVE INFORMATION: CMV Antiviral Resistance  Codons 457-630 of the UL97 gene and codons 393-1000 of the  UL54 gene are sequenced. Mutations associated with  resistance to ganciclovir, cidofovir, and foscarnet are  reported. Mutations in viral sub-populations below 20  percent of total may not be detected.  Test developed and characteristics determined by [facility]. See Compliance Statement B: [employer]/CS  Performed by [facility],  [address] [facility] [place] [address] [phone]  [email or web address], Jude Galvan MD, Lab. Director     10/14/2013   Final    Sensitive  Unit: not reported  (Note)  INTERPRETIVE INFORMATION: CMV Antiviral Resistance    Codons 457-630 of the UL97 gene and codons 393-1000 of the  UL54 gene are sequenced. Mutations associated with  resistance to ganciclovir, cidofovir, and foscarnet are  reported. Mutations in viral sub-populations below 20  percent of total may not be detected.    Test developed and characteristics determined by [facility]. See Compliance Statement B: netTALK/CS  Performed by [facility],  [address] [facility] [place] [address] [phone]  [email or web address], Jude Galvan MD, Lab. Director        No results found for: \"H6RES\"    EBV DNA Copies/mL   Date Value Ref Range Status   11/17/2022 <500 (A) Not Detected copies/mL Final     Comment:     EBV DNA Detected below the reportable range of 500 copies/mL   06/03/2021 704 (A) EBVNEG^EBV DNA Not Detected [Copies]/mL Final   10/30/2020 <500 (A) EBVNEG^EBV DNA Not Detected [Copies]/mL Final     Comment:     EBV DNA Detected below the reportable range of 500 Copies/mL   01/14/2013 Specimen not received  NO TUBE IN HEME <1000 Copies/mL Final       CMV Antibody IgG   Date Value Ref Range Status   08/04/2014 1.3 (H) 0.0 - 0.8 AI Final     Comment:     Positive     CMV IgG Antibody   Date Value Ref Range Status   09/08/2013 <0.20  Negative for anti-CMV IgG U/mL Final   07/31/2012 " <0.20  Negative for anti-CMV IgG U/mL Final     Herpes Simplex Virus IgG Antibody   Date Value Ref Range Status   09/08/2013 2.89 Index Value Final     Comment:     Positive     EBV VCA IgG Antibody   Date Value Ref Range Status   09/08/2013 281.00 U/mL Final     Comment:     Positive, suggests immunologic exposure.   07/31/2012 433.00 U/mL Final     Comment:     Positive, suggests immunologic exposure.       Toxoplasma Antibody IgG   Date Value Ref Range Status   07/31/2012 13.8 IU/mL Final     Comment:     Positive         Imaging:  CT c/a/p W 10/25/24  IMPRESSION:  1.  Interval development of new hazy ill-defined pulmonary nodules involving the right upper lobe and right middle lobe having the appearance of an acute infectious/inflammatory etiology.  2.  Interval decrease in prior consolidation posterior right lower lobe with minimal residual atelectasis remaining right lower lobe. Minimal atelectasis left lower lobe with chronic left basilar pleural fluid remaining stable.  3.  No new lymphadenopathy.  4.  Unchanged pancreatic cyst.  5.  Heterogeneous enhancement to a moderately enlarged prostate.      Roger Parker MD  [facility]  Contact information available via [facility] Paging/Directory     10/27/2024

## 2024-10-27 NOTE — PHARMACY-ADMISSION MEDICATION HISTORY
Pharmacist Admission Medication History    Admission medication history is complete. The information provided in this note is only as accurate as the sources available at the time of the update.    Information Source(s): patient had a medication history completed while he was at [facility] on 10/25/24 by the medication scribe.     Pertinent Information:   --  I did not update last doses as patient was hospitalized 10/24-present  -- medication list looks appropriate, appears that lisinopril and amlodipine were discontinued in September while patient was hospitalized due to hypotension issues.   -- Coreg was held at [facility] for hypotension before his transfer to our facility  -- can't confirm his insulin regimen though from his fill history    Changes made to PTA medication list:  Added: None  Deleted: None  Changed: None    Allergies reviewed with patient and updates made in EHR: no    Medication History Completed By: Jose Antonio Fairchild RPH 10/27/2024 1:58 PM  Prior to Admission medications    Medication Sig Last Dose Taking? Auth Provider Long Term End Date   acetaminophen (TYLENOL) 325 MG tablet Take 2 tablets (650 mg) by mouth every 6 hours as needed for mild pain   Vanda Boyd MD     acyclovir (ZOVIRAX) 400 MG tablet TAKE 1 TABLET (400 MG) BY MOUTH 2 TIMES DAILY   Amber Drake MD Yes    albuterol (PROAIR HFA/PROVENTIL HFA/VENTOLIN HFA) 108 (90 Base) MCG/ACT inhaler Inhale 1-2 puffs into the lungs every 6 hours as needed for shortness of breath or wheezing   Alma Murphy MD Yes    apixaban ANTICOAGULANT (ELIQUIS) 5 MG tablet Take 1 tablet (5 mg) by mouth 2 times daily.   Cassie Lopes MD     aspirin (ASA) 81 MG EC tablet Take 1 tablet (81 mg) by mouth daily   Vanda Boyd MD Yes    azithromycin (ZITHROMAX) 250 MG tablet Take 250 mg by mouth Every Mon, Wed, Fri Morning   Unknown, Entered By History     bisacodyl (DULCOLAX) 10 MG suppository Place 1 suppository (10 mg) rectally daily as needed for constipation (Use  if magnesium hydroxide (MILK of MAGNESIA) is not effective after 24 hours. May discontinue if patient having bowel movement.).   Cassie Lopes MD     blood glucose (NO BRAND SPECIFIED) test strip USE TO TEST BLOOD SUGAR 3 TIMES DAILY. DIAG CODE: E11.9   Hoa Olivarez APRN CNP     Calcium Carbonate-Vitamin D 600-10 MG-MCG TABS Take 1 tablet by mouth 2 times daily (with meals)   Starr Puentes PA-C     carvedilol (COREG) 6.25 MG tablet TAKE 1 TABLET (6.25 MG) BY MOUTH 2 TIMES DAILY (WITH MEALS)   Hoa Olivarez APRN CNP Yes    dapsone (ACZONE) 25 MG tablet Take 2 tablets (50 mg) by mouth daily   Alma Murphy MD     diclofenac (VOLTAREN) 1 % topical gel Apply 2 g topically 2 times daily as needed for moderate pain   Unknown, Entered By History     econazole nitrate 1 % external cream APPLY TOPICALLY DAILY TO FEET AND HEELS.   Robbin Rosales DPM     Ferrous Sulfate Dried (HIGH POTENCY IRON) 65 MG TABS Take 1 tablet by mouth every morning.   Reported, Patient     fludrocortisone (FLORINEF) 0.1 MG tablet Take 1 tablet (0.1 mg) by mouth daily   Alma Murphy MD Yes    fluticasone-salmeterol (ADVAIR-HFA) 230-21 MCG/ACT inhaler Inhale 2 puffs into the lungs 2 times daily   Alma Murphy MD Yes    furosemide (LASIX) 20 MG tablet Take 1 tablet (20 mg) by mouth daily   Hoa Olivarez APRN CNP Yes    HYDROmorphone (DILAUDID) 2 MG tablet Take 0.5 tablets (1 mg) by mouth every 6 hours as needed for severe pain.   Cassie Lopes MD No    insulin aspart (NOVOLOG FLEXPEN) 100 UNIT/ML pen Inject 5 Units subcutaneously daily (with breakfast). Do not give if blood sugar < 70 mg/dL.   Unknown, Entered By History Yes    insulin aspart (NOVOLOG PEN) 100 UNIT/ML pen Inject 3 Units subcutaneously 2 times daily (with meals). Lunch & supper   Hoa Olivarez APRN CNP Yes    insulin glargine (LANTUS PEN) 100 UNIT/ML pen Inject 18 Units Subcutaneous every morning (before breakfast)   Alma Murphy MD  Yes    insulin pen needle (32G X 4 MM) 32G X 4 MM miscellaneous Use 4 pen needles daily or as directed. Dispense as insurance allows. Dx. Code: E09.9   Hoa Olivarez APRN CNP     loperamide (IMODIUM) 2 MG capsule Take 1 capsule (2 mg) by mouth 4 times daily as needed for diarrhea   Chong Bishop MD     magnesium gluconate (MAGONATE) 500 MG tablet Take 1 tablet (500 mg) by mouth at bedtime.   Fabián Walker DO     magnesium hydroxide (MILK OF MAGNESIA) 400 MG/5ML suspension Take 30 mLs by mouth daily as needed for constipation (Use if polyethylene glycol (Miralax) is not effective after 24 hours.).   Cassie Lopes MD     melatonin 1 MG TABS tablet Take 1 tablet (1 mg) by mouth nightly as needed for sleep.   Cassie Lopes MD     metoclopramide (REGLAN) 5 MG tablet Take 1 tablet (5 mg) by mouth every 6 hours as needed (nausea)   Amber Drake MD     Microlet Lancets MISC CHECK BLOOD SUGAR FOUR TIMES DAILY E11.9   Hoa Olivarez APRN CNP     montelukast (SINGULAIR) 10 MG tablet Take 1 tablet (10 mg) by mouth every evening   Alma Murphy MD Yes    multivitamin, therapeutic (THERA-VIT) TABS Take 1 tablet by mouth daily   Anson Ornelas MD     order for DME Equipment being ordered: diabetic shoes   Hoa Olivarez APRN CNP     pantoprazole (PROTONIX) 40 MG EC tablet Take 1 tablet (40 mg) by mouth daily   Hoa Olivarez APRN CNP     predniSONE (DELTASONE) 5 MG tablet Take 1 tablet by mouth every morning.   Reported, Patient     predniSONE (DELTASONE) 5 MG tablet Take 0.5 tablets by mouth every evening.   Reported, Patient     psyllium (METAMUCIL/KONSYL) capsule Take 2 capsules by mouth 2 times daily.   Fabián Walker DO  9/4/25   rosuvastatin (CRESTOR) 40 MG tablet Take 20 mg by mouth Every Mon, Wed, Fri Morning   Unknown, Entered By History Yes    senna-docusate (SENOKOT-S/PERICOLACE) 8.6-50 MG tablet Take 1 tablet by mouth 2 times daily.   Cassie Lopes MD     tacrolimus (GENERIC  EQUIVALENT) 1 MG capsule Take 3 capsules (3 mg) by mouth 2 times daily. Total dose: 3 mg in AM and 3 mg in PM   Valentino Cristina MD No    thiamine (B-1) 100 MG tablet Take 1 tablet (100 mg) by mouth daily.   Cassie Lopes MD     traMADol 25 MG TABS tablet Take 1 tablet (25 mg) by mouth every 4 hours as needed for moderate pain.   Cassie Lopes MD     venlafaxine (EFFEXOR XR) 37.5 MG 24 hr capsule Take 37.5 mg by mouth daily.   Reported, Patient Yes    wound support modular (EXPEDITE) LIQD bottle Take 60 mLs by mouth daily.   Fabián Walker, DO

## 2024-10-27 NOTE — PLAN OF CARE
[id number]    Neuro: A&Ox4.   Cardiac: SR, 80-90s. -130s/70-80s. Afebrile.  Respiratory: Sating <92% on RA-2L NC.   GI/: Adequate urine output via primofit. Loose BM X3. PRN imodium given x2.  Diet/appetite: Tolerating consistent carb diet.   Activity:  Lift. Not oob this shift.  Pain: Pain in L leg between 3-5 this shift. PRN tylenol given x1.  Skin: L PIV infiltrated when infusing albumin. Arm became edematous and a portion with fluid collection under the skin. R PIV placed with trace edema developing but IV flushing okay with blood return - no pain indicated by patient, skin outlined on this arm as well. Vascular nurse assessed both upper arms for additional access sites, only one vein found but did not access due to nurse recommending a PICC due to pt being a hard poke and minimal options. Provider aware.  LDA's: R PIV. Primofit.    Plan: Continue with POC. Notify primary team with changes.    Lactic Acid 2.4 this morning, down from 4.6.  Critical procalcitonin 24 - provider aware.    Please see provider notification not for further shift details.    Goal Outcome Evaluation:      Plan of Care Reviewed With: patient    Overall Patient Progress: improvingOverall Patient Progress: improving    Outcome Evaluation: Lactic acid down to 2.4 this shift. Multiple antibitoics and albumin given this shift. VSS. PIV infiltration. Vascular nurse recommending PICC placement.

## 2024-10-27 NOTE — PLAN OF CARE
[id number]     Neuro: A&Ox4. Hearing aides and glasses at bedside. Pleasant but very tired (did not sleep last night - napping throughout day).  Cardiac: VSS. BPs 140s/80s-90s, MAPs low 100s. HR 80s-100s.  Respiratory: Currently sating >93% on 1L NC. RA and 1-4L NC used throughout day. Needed more O2 when tired.  GI/: Adequate urine output via primofit. BM X2 on bedpan  Diet/appetite: Tolerating consistent carb diet. Eating well.  Activity:  Independent/Ax1 in bed; not oob this shift. BLE BKAs.  Pain: At acceptable level on current regimen. PRN tylenol x1  Skin: Pitting edema surrounding R PIV and old L PIV (removed by NOC shift). Blanching on R elbow and started weeping ~1530 - team aware. LLE stump redressed with provider at bedside (pictures added to chart and WOC consult placed).  LDA's: R PIV SL, primofit    Goal Outcome Evaluation: No change - PICC placement (IR) tomorrow with follow up lymph wraps for BUE      Plan of Care Reviewed With: patient, spouse    Overall Patient Progress: improving    Outcome Evaluation: Pt on RA/1-4L NC throughout day - O2 needs less once slept a little. Ate well, used bedpan for 1 BM loose. R PIV WDL/intact/withdrawing blood but edema instantly with any sort of IV infusion; weeping by end of shift, providers aware. Per pt, happens with any IV regardless of whether it's infiltrated. Plan to use R PIV until PICC placement in IR tomorrow      Problem: Adult Inpatient Plan of Care  Goal: Plan of Care Review  Description: The Plan of Care Review/Shift note should be completed every shift.  The Outcome Evaluation is a brief statement about your assessment that the patient is improving, declining, or no change.  This information will be displayed automatically on your shift  note.  Outcome: Progressing  Flowsheets (Taken 10/27/2024 1705)  Outcome Evaluation:   Pt on RA/1-4L NC throughout day - O2 needs less once slept a little. Ate well, used bedpan for 1 BM loose. R PIV  WDL/intact/withdrawing blood but edema instantly with any sort of IV infusion   weeping by end of shift, providers aware. Per pt, happens with any IV regardless of whether it's infiltrated. Plan to use R PIV until PICC placement in IR tomorrow  Plan of Care Reviewed With:   patient   spouse  Overall Patient Progress: improving

## 2024-10-27 NOTE — PROVIDER NOTIFICATION
0230 RN to provider: FYI - pt had albumin running and now arm is edematous and has some fluid collection in the outlined area (picture attached to message). No pain or discomfort noted.   0235 Provider: thanks for the FYI, let's keep an eye on it.    0345 RN to provider: FYI - new PIC just infiltrated zosyn,. Vascular nurse said he is a very hard stick and is recommending a PICC. Per pt, sounds like they tried to place and failed at [place] so will likely need IR. Pt has vanco due at 0400 but no PIV... I don't think vascular can place a PIV in either arm now as each has been infiltrated.   0402 Provider: is vascular available to asses  0402 RN: I can see if I can get a hold of someone.   0403 Provider: I dont know what's available overnight in terms of IR vs Vascular if we were to do a PICC - it may have to wait until morning.    0407 RN: I was talking to vascular when they came and put in the new IV after the first infiltration and she confirmed infiltration on that first arm. She was saying that PICC placement likely would not happen until morning. I can see if she can come look at his arm and see if there is anywhere she can try and put a new one in. I will let you know what they say.   0427 RN: Vascular is here. The most recent PIV flused and draws blood but there is swelling so she does not recommend using it since vanco is an irritant. She is going to look and see what is available for PIV in the upper arms but stated that placing a PIV there would limit options for a PICC if/when one would get placed. She is unsure if IR is here tomorrow. So essentially the question is, do we want a PIV int he upper arm if she can find one or wait for a PICC?  0435 RN: Vascular only found one but she does not want to place a PIV because then he would have no options for a PICC and she is stating he needs one.  Provider called RN to state they were bringing the issue up with their superior and will decide.  0506 Provider: After  talking with my senior, we should put the IV to get access for this morning.   0507 RN: Got it.    After discussing with Charge RN on unit, due to the second PIV still being in tact and flushing/returning blood, it was decided to used the IV and infuse the vanco at a slower rate and monitor the site rather than losing the last vein for a potential PICC on the right side. There were no options on the L side for a PICC per vascular's assessment overnight. All information has been relayed to the day nurse for further monitoring of sites.     0604 RN: Critical: procalcitonin 24  0616 Provider: noted, thank you.

## 2024-10-27 NOTE — CONSULTS
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        senna-docusate (SENOKOT-S/PERICOLACE) 8.6-50 MG per tablet 1 tablet  1 tablet Oral BID PRN Jonny Sutherland MD        Or    senna-docusate (SENOKOT-S/PERICOLACE) 8.6-50 MG per tablet 2 tablet  2 tablet Oral BID PRN Jonny Sutherland MD        sodium chloride (PF) 0.9% PF flush 3 mL  3 mL Intracatheter q1 min prn Jonny Sutherland MD        traMADol (ULTRAM) half-tab 25 mg  25 mg Oral Q4H PRN Jonny Sutherland MD            Physical Exam:     Vital signs:  Temp: 97.4  F (36.3  C) Temp src: Oral BP: 135/86 Pulse: 89   Resp: 22 SpO2: 92 % O2 Device: Nasal cannula Oxygen Delivery: 2 LPM   Weight: 65.3 kg (143 lb 15.4 oz)  I/O:   Intake/Output Summary (Last 24 hours) at 10/27/2024 0761  Last data filed at 10/27/2024 0530  Gross per 24 hour   Intake 1240 ml   Output 800 ml   Net 440 ml     Constitutional: Alert, in no apparent distress.   HEENT: Eyes with pink conjunctivae, anicteric.  Oral mucosa moist without lesions  PULM: Good air flow, scattered bibasiilar crackles  CV: Normal S1 and S2. RRR.  No murmur, or edema   ABD: NABS, soft, non tender    MSK: Moves all extremities.  No apparent  muscle wasting  NEURO: Alert and oriented, conversant  SKIN: Warm, dry.  No rash on limited exam  PSYCH: Mood stable?     Lines, Drains, and Devices:  Peripheral IV 10/27/24 Right;Medial Lower forearm (Active)   Site Assessment Blanching;Edematous 10/27/24 0330   Line Status Saline locked 10/27/24 0248   Dressing Transparent 10/27/24 0248   Dressing Status clean;dry;intact 10/27/24 0248   Dressing Intervention New dressing  10/27/24 0248   Line Intervention Flushed 10/27/24 0248   Phlebitis Scale 1-->erythema at access site with or without pain 10/27/24 0330   Infiltration? yes 10/27/24 0330   Infiltration Scale 2 10/27/24 0330   Interventions Stop drug/IV fluids;Billy borders of extravasated area;Discontinue peripheral IV/Port needle;Cold compresses;Elevate extremity;Baseline photo 10/27/24 0330   If infiltrated, was a vesicant infusing? No 10/27/24 0330   Number of days: 0     Results:     LABS    CMP:   Recent Labs   Lab 10/27/24  0534 10/27/24  0256 10/26/24  2134 10/26/24  1829 10/26/24  1753 10/26/24  1128 10/26/24  0739 10/26/24  0728 10/26/24  0533 10/25/24  1859 10/25/24  1652 10/25/24  0119 10/25/24  0024     --   --   --  138  --   --   --  140  --  135   < > 134*   POTASSIUM 3.7  --   --   --  3.2*  --   --   --  3.8  --  4.8  4.8   < > 4.4   CHLORIDE 106  --   --   --  102  --   --   --  104  --  103   < > 103   CO2 23  --   --   --  21*  --   --   --  21*  --  17*   < > 17*   ANIONGAP 11  --   --   --  15  --   --   --  15  --  15   < > 14   * 146* 153* 144* 151*   < >  --    < > 198*   < > 119*   < > 78   BUN 36.3*  --   --   --  30.0*  --   --   --  22.1  --  23.1*   < > 30.1*   CR 1.00  --   --   --  1.02  --   --   --  0.79  --  0.92   < > 0.82   GFRESTIMATED 80  --   --   --  79  --   --   --  >90  --  89   < > >90   ERIN 7.4*  --   --   --  7.5*  --   --   --  7.6*  --  7.6*   < > 7.7*   MAG 2.6*  --   --   --  2.7*  --  1.4*  --  1.4*  --   --   --  1.5*   PHOS 3.3  --   --   --   --  "  --   --   --  3.7  --   --   --  2.9   PROTTOTAL 4.8*  --   --   --  4.8*  --   --   --  4.5*  --  4.7*   < > 5.1*   ALBUMIN 2.7*  --   --   --  2.4*  --   --   --  2.2*  --  2.2*   < > 2.5*   BILITOTAL 0.5  --   --   --  0.4  --   --   --  0.4  --  0.5   < > 0.5   ALKPHOS 110  --   --   --  117  --   --   --  104  --  113   < > 138   AST 45  --   --   --  47*  --   --   --  47*  --  48*   < > 38   ALT 24  --   --   --  29  --   --   --  33  --  34   < > 43    < > = values in this interval not displayed.     CBC:   Recent Labs   Lab 10/27/24  0534 10/26/24  2344 10/26/24  1753 10/26/24  0533   WBC 3.8* 3.9* 3.9* 3.2*   RBC 2.65* 2.72* 2.98* 2.87*   HGB 8.2* 8.4* 9.2* 9.1*   HCT 26.5* 27.1* 29.6* 27.8*    100 99 97   MCH 30.9 30.9 30.9 31.7   MCHC 30.9* 31.0* 31.1* 32.7   RDW 16.4* 16.4* 16.3* 16.2*   * 141* 147* 125*       INR:   Recent Labs   Lab 10/27/24  0534   INR 1.59*       Glucose:   Recent Labs   Lab 10/27/24  0534 10/27/24  0256 10/26/24  2134 10/26/24  1829 10/26/24  1753 10/26/24  1128   * 146* 153* 144* 151* 196*       Blood Gas:   Recent Labs   Lab 10/27/24  0533 10/26/24  0533 10/25/24  2344   PHV 7.47* 7.46* 7.41   PCO2V 36* 33* 33*   PO2V 70* 48* 90*   HCO3V 26 24 21   CAMPBELL 2.5 0.2 -3.1*   O2PER 2 0 28       Culture Data No results for input(s): \"CULT\" in the last 168 hours.    Virology Data:   Lab Results   Component Value Date    INFLUA Negative 01/28/2014    FLUAH1 Not Detected 10/26/2024    FLUAH3 Not Detected 10/26/2024    KA1895 Not Detected 10/26/2024    IFLUB Not Detected 10/26/2024    RSVA Not Detected 10/26/2024    RSVB Not Detected 10/26/2024    PIV1 Not Detected 10/26/2024    PIV2 Not Detected 10/26/2024    PIV3 Not Detected 10/26/2024    HMPV Not Detected 10/26/2024    HRVS Negative 02/24/2019    ADVBE Negative 02/24/2019    ADVC Negative 02/24/2019    ADVC Negative 08/05/2014    ADVC Negative 06/03/2014       Historical CMV results (last 3 of prior testing):  Lab " Results   Component Value Date    CMVQNT Not Detected 10/25/2024    CMVQNT Not Detected 07/12/2024    CMVQNT Not Detected 05/16/2024     Lab Results   Component Value Date    CMVLOG Not Calculated 06/03/2021    CMVLOG Not Calculated 05/12/2021    CMVLOG Not Calculated 02/19/2020       Urine Studies    Recent Labs   Lab Test 10/25/24  1250 08/29/24  1522   URINEPH 5.0 5.0   NITRITE Negative Negative   LEUKEST Trace* Negative   WBCU 12* 1       Most Recent [place] Pulmonary Function Testing (FVC/FEV1 only)  FVC-Pre   Date Value Ref Range Status   05/16/2024 3.69 L    11/17/2023 3.67 L    05/25/2023 3.74 L    11/17/2022 3.68 L      FVC-%Pred-Pre   Date Value Ref Range Status   05/16/2024 101 %    11/17/2023 100 %    05/25/2023 93 %    11/17/2022 91 %      FEV1-Pre   Date Value Ref Range Status   05/16/2024 3.03 L    11/17/2023 2.93 L    05/25/2023 2.94 L    11/17/2022 2.80 L      FEV1-%Pred-Pre   Date Value Ref Range Status   05/16/2024 108 %    11/17/2023 104 %    05/25/2023 96 %    11/17/2022 91 %        IMAGING    No results found for this or any previous visit (from the past 48 hours).

## 2024-10-27 NOTE — PHARMACY-VANCOMYCIN DOSING SERVICE
Pharmacy Vancomycin Note  Date of Service 2024  Patient's  1955   69 year old, male    Indication: Community Acquired Pneumonia, Skin and Soft Tissue Infection, and Urinary Tract Infection  Day of Therapy: 2  Current vancomycin regimen:  2000 mg IV q18h  Current vancomycin monitoring method: AUC  Current vancomycin therapeutic monitoring goal: 400-600 mg*h/L    InsightRX Prediction of Current Vancomycin Regimen  Loading dose: N/A  Regimen: 1000 mg IV every 18 hours.  Start time: 04:11 on 10/27/2024  Exposure target: AUC24 (range)400-600 mg/L.hr   AUC24,ss: 598 mg/L.hr  Probability of AUC24 > 400: 100 %  Ctrough,ss: 18.6 mg/L  Probability of Ctrough,ss > 20: 37 %  Probability of nephrotoxicity (Lodise SEYMOUR ): 15 %    Current estimated CrCl = Estimated Creatinine Clearance: 61.4 mL/min (based on SCr of 1.02 mg/dL).    Creatinine for last 3 days  10/25/2024: 12:24 AM Creatinine 0.82 mg/dL;  5:54 AM Creatinine 0.87 mg/dL;  4:52 PM Creatinine 0.92 mg/dL  10/26/2024:  5:33 AM Creatinine 0.79 mg/dL;  5:53 PM Creatinine 1.02 mg/dL    Recent Vancomycin Levels (past 3 days)  10/26/2024:  6:48 PM Vancomycin 22.1 ug/mL    Vancomycin IV Administrations (past 72 hours)                     vancomycin (VANCOCIN) 1,000 mg in 200 mL dextrose intermittent infusion (mg) 1,000 mg New Bag 10/26/24 1011     1,000 mg New Bag 10/25/24 1720    vancomycin (VANCOCIN) 1,500 mg in 0.9% NaCl 265 mL intermittent infusion (mg) 1,500 mg New Bag 10/25/24 0253                    Nephrotoxins and other renal medications (From now, onward)      Start     Dose/Rate Route Frequency Ordered Stop    10/27/24 0400  vancomycin (VANCOCIN) 1,000 mg in 200 mL dextrose intermittent infusion         1,000 mg  200 mL/hr over 1 Hours Intravenous EVERY 18 HOURS 10/26/24 2136      10/26/24 2000  acyclovir (ZOVIRAX) tablet 400 mg        Note to Pharmacy: PTA Sig:TAKE 1 TABLET (400 MG) BY MOUTH 2 TIMES DAILY      400 mg Oral 2 TIMES DAILY 10/26/24  "1735      10/26/24 2000  [Held by provider]  tacrolimus (GENERIC EQUIVALENT) capsule 3 mg        (On hold since today at 1735 until manually unheld; held by Jonny Sutherland MDHold Reason: Other)   Note to Pharmacy: PTA Sig:Take 3 capsules (3 mg) by mouth 2 times daily. Total dose: 3 mg in AM and 3 mg in PM      3 mg Oral 2 TIMES DAILY 10/26/24 1735      10/26/24 1900  piperacillin-tazobactam (ZOSYN) intermittent infusion 4.5 g        Note to Pharmacy: For [facility], [facility] and [facility]: For Zosyn-naive patients, use the \"Zosyn initial dose + extended infusion\" order panel.    4.5 g  200 mL/hr over 30 Minutes Intravenous EVERY 6 HOURS 10/26/24 1834      10/26/24 1800  [Held by provider]  furosemide (LASIX) tablet 20 mg        (On hold since today at 1735 until manually unheld; held by Jonny Sutherland MDHold Reason: Other)   Note to Pharmacy: PTA Sig:Take 1 tablet (20 mg) by mouth daily      20 mg Oral DAILY 10/26/24 1735                 Contrast Orders - past 72 hours (72h ago, onward)      None            Interpretation of levels and current regimen:  Vancomycin level is reflective of -600    Has serum creatinine changed greater than 50% in last 72 hours: No    Urine output:  unable to determine    Renal Function: Stable    Plan:  Continue vancomycin dose of 1000 mg IV q18h that was started at the OSH  Vancomycin monitoring method: AUC  Vancomycin therapeutic monitoring goal: 400-600 mg*h/L  Pharmacy will check vancomycin levels as appropriate in 1-3 Days.  Serum creatinine levels will be ordered daily for the first week of therapy and at least twice weekly for subsequent weeks.    Svetlana Cantrell, [facility]    "

## 2024-10-27 NOTE — CODE/RAPID RESPONSE
"   10/26/24 1800   Call Information   Date of Call 10/26/24   Time of Call 1810   Name of person requesting the team Pao JOHNSON   Title of person requesting team RN   RRT Arrival time 1814   Time RRT ended 1825   Reason for call   Type of RRT Adult   Primary reason for call Sepsis suspected   Sepsis Suspected Elevated Lactate level   Was patient transferred from the ED, ICU, or PACU within last 24 hours prior to RRT call? No   SBAR   Situation LA 4.3   Background Per provider note, \"Aubrey Duncan is a 69 year old male who presents on 10/24/2024 with lethargy. On evaluation he is septic with fever, tachycardia, and elevated lactic acid. CT chest/abdomen/pelvis showing possible pneumonia and urinalysis UTI. Recent left BKA with wound dehiscence and minimal purulent drainage, no obvious cellulitis. Unclear source of infection, however started on vancomycin and Zosyn with fluid bolus followed by maintenance.\"   Notable History/Conditions Cancer;Cardiac;COPD;Transplant;Recent surgery;End-Stage disease;Diabetes   Assessment Pt sitting up in bed, VSS on RA. AO+4, no complaints of SOB, new fever or chills, or chest pain.   Interventions No interventions  (Per primary team)   Patient Outcome   Patient Outcome Stabilized on unit   RRT Team   Attending/Primary/Covering Physician Ludwin 1   Date Attending Physician notified 10/26/24   Time Attending Physician notified 1810   Physician(s) Abbi Bruno PA-C   Lead RN Ame RUIZ, RN   KATHERYN JOHNSON   RT N/A   Other staff Roberta PORTILLO RN   Post RRT Intervention Assessment   Post RRT Assessment Stable/Improved   Date Follow Up Done 10/26/24   Time Follow Up Done 2145   Comments VSS. Patient resting comfortably in bed. Lactic acid recheck in the AM       "

## 2024-10-27 NOTE — PROGRESS NOTES
PICC ordered. On vascular access notes dated 10/25/24, PICC was attempted but failed on bilateral arms. Internal jugular was recommended. Sent message to Dr. Sutherland and waiting for reply.

## 2024-10-27 NOTE — PROGRESS NOTES
[facility]    Progress Note - Medicine Service, [facility] TEAM 1       Date of Admission:  10/26/2024    Assessment & Plan   Aubrey Duncan is a 69 year old male. He has CAD, DLBCL of esophagus (s/p rituximab), HLD, DM2, CKD 3b, Bilateral lung transplant 2/2 COPD and Alpha-1-antitrypsin deficiency in 2013, GERD, Anemia, and recent BKA who presents for sepsis. Pt initially presented to [facility] in [place] for lethargy on 10/24/24 transferred to our facility on 10/26 for further workup of shock. By the time of presentation had received 5L of IVF and stress dose steroids. No longer in shock and appears clinically well.      Today:  - Transplant ID and Pulmonology consulted, appreciate their help managing this complex patient.  - Stopped Vancomycin  - Switched from IV to PO Minocycline 100mg BID  - Restarted home Azithromycin  - Stopped stress dose steroids  - RVP negative  - Increased O2 requirements up to 4L via NC. Found to have worse crackles and wheezing on exam. CXR showing pulmonary edema. Likely from albumin and large amount of IVF. Improved with DuoNeb. Restarted home Lasix 20mg.  - Consulted IR for PICC placement given very difficult peripheral access. Having arm edema with infusion of any medications although still drawing well.       #Acute hypoxic respiratory failure  #H/o bilateral lung transplant 2/2 COPD and Alpha-1-antitrypsin deficiency  Transplanted in 2013 for A1AT deficency. 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 Has not had Tacrolimus level checked in >1 month prior to hospitalization. On check was found to be significantly elevated at 39.8. Recheck today Tacro level was improved to 17.3. Had SOB with hypoxia overnight requiring 4L via NC. Improved after DuoNeb and with Lasix.   - Transplant Pulmonology following  -  Continue BID Prednisone (5 and 2.5mg), Dapsone, Montelukast  - Holding Tacrolimus (Goal 8-10), checking value on 10/28  - DuoNeb PRN  - PTA Lasix 20mg PO    #Sepsis c/b suspected septic shock   #Likely Pulmonary vs Urinary source  Initially presented on 10/24 septic with fever, tachycardia, leukopenia, and elevated lactate. UA showing pyuria and bacteruria, urine cx not obtained. CT C/A/P showing R upper and middle lobe ill defined nodules concerning for infectious/inflammatory etiology. No abdominal focus of infection. Procalcitonin uptrending from 16 to 37. Negative Bcx and Urine cx. Negative strep pneumo, legionella, COVID, Flu, RSV, MRSA. Developed shock on AM of 10/25 with further course as below.  - Histo, B-D glucan, Aspergillus are pending  - DSA and IgG pending  - Continue Zosyn  - If hemodynamic changes and new fevers would broaden Zosyn to Meropenem  - Will not give additional IVF at this time as he receieved 5L yesterday and does not meet SIRS criteria at this time.   - Transplant ID consulted.      #Shock - Resolved  #Septic shock vs Adrenal crisis  Became significantly hypotensive AM of 10/25. Thought to be 2/2 septic shock vs acute adrenal insufficiency. Received 4.7L of IVF. Did not require pressors. Was given stress dose steroids with 100mg Hydrocortisone followed by 50mg q6h. Lactate peaked at 7.2 and has been downtrending. On arrival to this hospital BP was stable at 137/82, Lactate 4.2, and he appeared clinically well. Lactate, Procalcitonin, and CRP downtrending.   - Completed stress dose steroids  - If hypotensive again can restart Hydrocortisone 50mg and consider IVF.      #Pancytopenia  #h/o normocytic anemia  New pancytopenia with this presentation on top of existing anemia. Baseline Hgb 9-10. Improving since onset. On admission WBC 3.9, Hgb 9.2, and Plt 147. Differential includes marrow suppression from acute infection, DIC, medication effect, and malignancy. Fibrinogen normal. D-dimer  mildly elevated at 1.19.   - CTM     #h/o Bilateral BKA 2/2 severe PAD and limb ischemia  S/p bilateral BKA (left 9/25 and right 8/28). Noted some wound dehiscence and granulation tissue on L stump over last few weeks but not a significant amount of drainage or purulence.   - WOC consult     #History of HIIT  #History of DVT/PE  - PTA Apixaban 5mg BID     #History of CMV Viremia  - PTA 400mg Acyclovir BID     #Reported H/o CKD3b  Recent Cr and eGFR have been in the normal range. Baseline Cr 0.9-1.0.  - CTM      #Steroid induced DM  A1c of 4.2% on  Home dose of Lantus 18u in morning and Novolog 5u with breakfast and 3u with lunch/dinner.   - Continue Lantus 18U in morning  - Continue Novolog 5U with breakfast and 3U with lunch/dinner  - MDSSI  - BS check before meals and in morning  - Hypoglycemia protocol      #Hypokalemia  K of 3.2 on admission. Likely due to large amount of fluid resuscitation PTA  - CTM and replete     #Hypocalcemia  #Hypomagenesemia  #Hypermagnesemia  Ca of 7.5 on admit, appears chronically low around 8.0. Magnesium has been low and requiring replacement at OSH. 1.4 prior to transfer, received 4g of Mag sulfate.  Mag elevated to 2.7 on presentation here. Vit D normal at 23. PTH mildly elevated at 99. Likely contribution of CKD and hypomagnesemia.  - CTM and correct hypomagnesemia  - CTM calcium, may provide oral replacement once magnesium is corrected     #GERD  - PTA Pantoprazole 40mg     #HTN  - Holding Coreg 6.25 BID     #CAD  Follows with cardiology, last seen 8/2024. CAD s/p PCI with MEGHNA x 1 to LAD (1/11/24) and PCI with MEGHNA x 1 to RCA (2/16/24). Managed PTA with DAPT (ASA 81 mg and clopidogrel 75 mg). He is to be on DAPT x 1 year followed by lifelong ASA 81 mg monotherapy.   - Continue ASA  - Continue Rosuvastatin  - Holding Clopidogrel at this time given thrombocytopenia     #History of diffuse large b-cell lymphoma of esophagus  - diagnosed after GI bleed in 3/24.  Started on weekly  rituximab x 4 doses in 4/2024.  Restaging PET after this showed good response with decreased hypermetabolism in distal duodenum.    - held off on further rituximab given plan for vascular surgery for non-healing foot ulcers.    - follows with Dr. Drake ([facility] oncology) and perhaps with a local oncologist as well.  Plan was for repeat PET scan in 3 months, which would be this month but I don't see this scheduled.  Recommend follow-up with oncology on discharge.          Diet: Moderate Consistent Carb (60 g CHO per Meal) Diet    DVT Prophylaxis: DOAC  Naranjo Catheter: Not present  Fluids: None  Lines: 1 PIV     Cardiac Monitoring: None  Code Status: Full Code      Clinically Significant Risk Factors Present on Admission        # Hypokalemia: Lowest K = 3.2 mmol/L in last 2 days, will replace as needed      # Hypomagnesemia: Lowest Mg = 1.4 mg/dL in last 2 days, will replace as needed   # Hypoalbuminemia: Lowest albumin = 2.2 g/dL at 10/26/2024  5:33 AM, will monitor as appropriate  # Drug Induced Coagulation Defect: home medication list includes an anticoagulant medication  # Drug Induced Platelet Defect: home medication list includes an antiplatelet medication   # Hypertension: Noted on problem list    # Anemia: based on hgb <11           # Financial/Environmental Concerns:           Disposition Plan      Expected Discharge Date: 11/01/2024                The patient's care was discussed with the Attending Physician, Dr. Iglesias .    Jonny Sutherland MD  Medicine Service, [facility] TEAM [address] [place] [address]  Securely message with avox (more info)  Text page via [facility] Paging/Directory   See signed in provider for up to date coverage information  ______________________________________________________________________    Interval History   Pt had increased SOB with O2 requirement up to 4L overnight. Improved with DuoNeb.     Baseline amount of diarrhea is unchanged. Mild amount of pain in L  BKA stump which is unchanged. No chest pain, cough, f/c/s, abd pain, n/v, or urinary sx.     Physical Exam   Vital Signs: Temp: 97.4  F (36.3  C) Temp src: Oral BP: 135/86 Pulse: 89   Resp: 22 SpO2: 92 % O2 Device: Nasal cannula Oxygen Delivery: 2 LPM  Weight: 143 lbs 15.37 oz    General: Alert. Sitting in bed comfortably. NAD. Well developed and nourished.  HEENT: NC, AT, MMM, EOMI, Chronic L pupillary defect, Clear conjunctivae, normal oropharynx  Cardiovascular: RRR, No murmurs, rubs, or gallops. Normal S1 and S2.  Pulmonary: 98% on 4L via NC. Diffuse crackles and wheezing. Normal inspiratory effort.   Abdominal: Mildly distended but soft, non-tender. Normal bowel sounds.  Extremities: Bilateral BKA. R stump well healed with no skin breakdown. L stump wrapped in several layers of gauze, small amount of clear drainage soaking the gauze. Reviewed pictures in media tab.   Skin: Scattered bruising to BUE.   Neuro: A&Ox4, Speech is tremulous, CN not tested but appear grossly intact. Moving all extremities.       Medical Decision Making           Data     I have personally reviewed the following data over the past 24 hrs:    3.8 (L)  \   8.2 (L)   / 146 (L)     140 106 36.3 (H) /  176 (H)   3.7 23 1.00 \     ALT: 24 AST: 45 AP: 110 TBILI: 0.5   ALB: 2.7 (L) TOT PROTEIN: 4.8 (L) LIPASE: N/A     Procal: 24.00 (HH) CRP: 217.00 (H) Lactic Acid: 2.4 (H)       INR:  1.59 (H) PTT:  41 (H)   D-dimer:  1.19 (H) Fibrinogen:  399     Ferritin:  N/A % Retic:  1.7 LDH:  N/A       Imaging results reviewed over the past 24 hrs:   Recent Results (from the past 24 hours)   XR Chest Port 1 View    Narrative    EXAMINATION: XR CHEST PORT 1 VIEW, 10/27/2024 8:49 AM    COMPARISON: 1/9/2024    HISTORY: worsening O2 requirement    FINDINGS: Changes of bilateral lung transplant. Cardiac silhouette is  enlarged. Interstitial opacities bilaterally have increased.      Impression    IMPRESSION: Increased size of the cardiac silhouette with  increased  pulmonary edema.    JULISSA OLIVAS MD         SYSTEM ID:  [id number]

## 2024-10-28 ENCOUNTER — DOCUMENTATION ONLY (OUTPATIENT)
Dept: OTHER | Facility: CLINIC | Age: 69
End: 2024-10-28

## 2024-10-28 ENCOUNTER — APPOINTMENT (OUTPATIENT)
Dept: CARDIOLOGY | Facility: CLINIC | Age: 69
DRG: 871 | End: 2024-10-28
Payer: MEDICARE

## 2024-10-28 ENCOUNTER — APPOINTMENT (OUTPATIENT)
Dept: GENERAL RADIOLOGY | Facility: CLINIC | Age: 69
DRG: 871 | End: 2024-10-28
Payer: MEDICARE

## 2024-10-28 LAB
ANION GAP SERPL CALCULATED.3IONS-SCNC: 11 MMOL/L (ref 7–15)
ANION GAP SERPL CALCULATED.3IONS-SCNC: 14 MMOL/L (ref 7–15)
BUN SERPL-MCNC: 31.4 MG/DL (ref 8–23)
BUN SERPL-MCNC: 36.7 MG/DL (ref 8–23)
CALCIUM SERPL-MCNC: 7.4 MG/DL (ref 8.8–10.4)
CALCIUM SERPL-MCNC: 7.6 MG/DL (ref 8.8–10.4)
CHLORIDE SERPL-SCNC: 108 MMOL/L (ref 98–107)
CHLORIDE SERPL-SCNC: 110 MMOL/L (ref 98–107)
CREAT SERPL-MCNC: 0.86 MG/DL (ref 0.67–1.17)
CREAT SERPL-MCNC: 0.93 MG/DL (ref 0.67–1.17)
EGFRCR SERPLBLD CKD-EPI 2021: 88 ML/MIN/1.73M2
EGFRCR SERPLBLD CKD-EPI 2021: >90 ML/MIN/1.73M2
ERYTHROCYTE [DISTWIDTH] IN BLOOD BY AUTOMATED COUNT: 16.6 % (ref 10–15)
GALACTOMANNAN AG SERPL QL IA: NEGATIVE
GALACTOMANNAN AG SPEC IA-ACNC: 0.06
GLUCOSE BLDC GLUCOMTR-MCNC: 119 MG/DL (ref 70–99)
GLUCOSE BLDC GLUCOMTR-MCNC: 127 MG/DL (ref 70–99)
GLUCOSE BLDC GLUCOMTR-MCNC: 48 MG/DL (ref 70–99)
GLUCOSE BLDC GLUCOMTR-MCNC: 76 MG/DL (ref 70–99)
GLUCOSE BLDC GLUCOMTR-MCNC: 83 MG/DL (ref 70–99)
GLUCOSE BLDC GLUCOMTR-MCNC: 84 MG/DL (ref 70–99)
GLUCOSE SERPL-MCNC: 110 MG/DL (ref 70–99)
GLUCOSE SERPL-MCNC: 45 MG/DL (ref 70–99)
H CAPSUL AG UR QL IA: NOT DETECTED
H CAPSUL AG UR-MCNC: NOT DETECTED NG/ML
HCO3 SERPL-SCNC: 22 MMOL/L (ref 22–29)
HCO3 SERPL-SCNC: 25 MMOL/L (ref 22–29)
HCT VFR BLD AUTO: 35.5 % (ref 40–53)
HGB BLD-MCNC: 10.6 G/DL (ref 13.3–17.7)
IGG SERPL-MCNC: 552 MG/DL (ref 610–1616)
LVEF ECHO: NORMAL
MAGNESIUM SERPL-MCNC: 2.3 MG/DL (ref 1.7–2.3)
MCH RBC QN AUTO: 30.9 PG (ref 26.5–33)
MCHC RBC AUTO-ENTMCNC: 29.9 G/DL (ref 31.5–36.5)
MCV RBC AUTO: 104 FL (ref 78–100)
PATH REPORT.COMMENTS IMP SPEC: NORMAL
PATH REPORT.COMMENTS IMP SPEC: NORMAL
PATH REPORT.FINAL DX SPEC: NORMAL
PATH REPORT.MICROSCOPIC SPEC OTHER STN: NORMAL
PATH REPORT.MICROSCOPIC SPEC OTHER STN: NORMAL
PATH REPORT.RELEVANT HX SPEC: NORMAL
PLATELET # BLD AUTO: 182 10E3/UL (ref 150–450)
POTASSIUM SERPL-SCNC: 3 MMOL/L (ref 3.4–5.3)
POTASSIUM SERPL-SCNC: 3.4 MMOL/L (ref 3.4–5.3)
POTASSIUM SERPL-SCNC: 3.4 MMOL/L (ref 3.4–5.3)
RBC # BLD AUTO: 3.43 10E6/UL (ref 4.4–5.9)
SODIUM SERPL-SCNC: 144 MMOL/L (ref 135–145)
SODIUM SERPL-SCNC: 146 MMOL/L (ref 135–145)
TACROLIMUS BLD-MCNC: 12.8 UG/L (ref 5–15)
TME LAST DOSE: NORMAL H
TME LAST DOSE: NORMAL H
WBC # BLD AUTO: 7.1 10E3/UL (ref 4–11)

## 2024-10-28 PROCEDURE — 250N000009 HC RX 250: Performed by: STUDENT IN AN ORGANIZED HEALTH CARE EDUCATION/TRAINING PROGRAM

## 2024-10-28 PROCEDURE — 999N000157 HC STATISTIC RCP TIME EA 10 MIN

## 2024-10-28 PROCEDURE — 255N000002 HC RX 255 OP 636: Performed by: INTERNAL MEDICINE

## 2024-10-28 PROCEDURE — 71045 X-RAY EXAM CHEST 1 VIEW: CPT | Mod: 26 | Performed by: RADIOLOGY

## 2024-10-28 PROCEDURE — 36415 COLL VENOUS BLD VENIPUNCTURE: CPT | Performed by: PHYSICIAN ASSISTANT

## 2024-10-28 PROCEDURE — 82310 ASSAY OF CALCIUM: CPT

## 2024-10-28 PROCEDURE — 250N000013 HC RX MED GY IP 250 OP 250 PS 637

## 2024-10-28 PROCEDURE — 250N000012 HC RX MED GY IP 250 OP 636 PS 637

## 2024-10-28 PROCEDURE — 250N000013 HC RX MED GY IP 250 OP 250 PS 637: Performed by: PHYSICIAN ASSISTANT

## 2024-10-28 PROCEDURE — 99233 SBSQ HOSP IP/OBS HIGH 50: CPT | Mod: 24 | Performed by: PHYSICIAN ASSISTANT

## 2024-10-28 PROCEDURE — 97602 WOUND(S) CARE NON-SELECTIVE: CPT

## 2024-10-28 PROCEDURE — 999N000208 ECHOCARDIOGRAM COMPLETE

## 2024-10-28 PROCEDURE — 80048 BASIC METABOLIC PNL TOTAL CA: CPT | Performed by: STUDENT IN AN ORGANIZED HEALTH CARE EDUCATION/TRAINING PROGRAM

## 2024-10-28 PROCEDURE — 85014 HEMATOCRIT: CPT | Performed by: STUDENT IN AN ORGANIZED HEALTH CARE EDUCATION/TRAINING PROGRAM

## 2024-10-28 PROCEDURE — 82374 ASSAY BLOOD CARBON DIOXIDE: CPT | Performed by: STUDENT IN AN ORGANIZED HEALTH CARE EDUCATION/TRAINING PROGRAM

## 2024-10-28 PROCEDURE — 71045 X-RAY EXAM CHEST 1 VIEW: CPT

## 2024-10-28 PROCEDURE — 80197 ASSAY OF TACROLIMUS: CPT | Performed by: PHYSICIAN ASSISTANT

## 2024-10-28 PROCEDURE — 85041 AUTOMATED RBC COUNT: CPT | Performed by: STUDENT IN AN ORGANIZED HEALTH CARE EDUCATION/TRAINING PROGRAM

## 2024-10-28 PROCEDURE — 83735 ASSAY OF MAGNESIUM: CPT

## 2024-10-28 PROCEDURE — 94640 AIRWAY INHALATION TREATMENT: CPT | Mod: 76

## 2024-10-28 PROCEDURE — 82374 ASSAY BLOOD CARBON DIOXIDE: CPT

## 2024-10-28 PROCEDURE — 80048 BASIC METABOLIC PNL TOTAL CA: CPT

## 2024-10-28 PROCEDURE — G0463 HOSPITAL OUTPT CLINIC VISIT: HCPCS | Mod: 25

## 2024-10-28 PROCEDURE — 80051 ELECTROLYTE PANEL: CPT

## 2024-10-28 PROCEDURE — 99233 SBSQ HOSP IP/OBS HIGH 50: CPT | Mod: GC | Performed by: STUDENT IN AN ORGANIZED HEALTH CARE EDUCATION/TRAINING PROGRAM

## 2024-10-28 PROCEDURE — 120N000003 HC R&B IMCU UMMC

## 2024-10-28 PROCEDURE — 36415 COLL VENOUS BLD VENIPUNCTURE: CPT

## 2024-10-28 PROCEDURE — 36415 COLL VENOUS BLD VENIPUNCTURE: CPT | Performed by: STUDENT IN AN ORGANIZED HEALTH CARE EDUCATION/TRAINING PROGRAM

## 2024-10-28 PROCEDURE — 99233 SBSQ HOSP IP/OBS HIGH 50: CPT | Mod: 24 | Performed by: STUDENT IN AN ORGANIZED HEALTH CARE EDUCATION/TRAINING PROGRAM

## 2024-10-28 PROCEDURE — 250N000011 HC RX IP 250 OP 636

## 2024-10-28 PROCEDURE — 94640 AIRWAY INHALATION TREATMENT: CPT

## 2024-10-28 PROCEDURE — 93306 TTE W/DOPPLER COMPLETE: CPT | Mod: 26 | Performed by: INTERNAL MEDICINE

## 2024-10-28 RX ORDER — POTASSIUM CHLORIDE 750 MG/1
40 TABLET, EXTENDED RELEASE ORAL ONCE
Status: COMPLETED | OUTPATIENT
Start: 2024-10-28 | End: 2024-10-28

## 2024-10-28 RX ORDER — POTASSIUM CHLORIDE 750 MG/1
20 TABLET, EXTENDED RELEASE ORAL ONCE
Status: COMPLETED | OUTPATIENT
Start: 2024-10-28 | End: 2024-10-28

## 2024-10-28 RX ORDER — TACROLIMUS 1 MG/1
1 CAPSULE ORAL
Status: DISCONTINUED | OUTPATIENT
Start: 2024-10-29 | End: 2024-10-30

## 2024-10-28 RX ORDER — IPRATROPIUM BROMIDE AND ALBUTEROL SULFATE 2.5; .5 MG/3ML; MG/3ML
3 SOLUTION RESPIRATORY (INHALATION) 4 TIMES DAILY
Status: DISCONTINUED | OUTPATIENT
Start: 2024-10-28 | End: 2024-11-05 | Stop reason: HOSPADM

## 2024-10-28 RX ADMIN — MONTELUKAST 10 MG: 10 TABLET, FILM COATED ORAL at 20:38

## 2024-10-28 RX ADMIN — VENLAFAXINE HYDROCHLORIDE 37.5 MG: 37.5 CAPSULE, EXTENDED RELEASE ORAL at 07:51

## 2024-10-28 RX ADMIN — IPRATROPIUM BROMIDE AND ALBUTEROL SULFATE 3 ML: .5; 3 SOLUTION RESPIRATORY (INHALATION) at 20:54

## 2024-10-28 RX ADMIN — APIXABAN 5 MG: 5 TABLET, FILM COATED ORAL at 20:38

## 2024-10-28 RX ADMIN — FLUTICASONE FUROATE AND VILANTEROL TRIFENATATE 1 PUFF: 200; 25 POWDER RESPIRATORY (INHALATION) at 07:54

## 2024-10-28 RX ADMIN — POTASSIUM CHLORIDE 40 MEQ: 750 TABLET, EXTENDED RELEASE ORAL at 14:01

## 2024-10-28 RX ADMIN — PANTOPRAZOLE SODIUM 40 MG: 40 TABLET, DELAYED RELEASE ORAL at 07:52

## 2024-10-28 RX ADMIN — PREDNISONE 2.5 MG: 2.5 TABLET ORAL at 20:38

## 2024-10-28 RX ADMIN — LOPERAMIDE HYDROCHLORIDE 2 MG: 2 CAPSULE ORAL at 20:38

## 2024-10-28 RX ADMIN — MINOCYCLINE HYDROCHLORIDE 100 MG: 100 CAPSULE ORAL at 07:53

## 2024-10-28 RX ADMIN — ACETAMINOPHEN 650 MG: 325 TABLET, FILM COATED ORAL at 20:38

## 2024-10-28 RX ADMIN — THIAMINE HCL TAB 100 MG 100 MG: 100 TAB at 07:52

## 2024-10-28 RX ADMIN — DAPSONE 50 MG: 25 TABLET ORAL at 07:59

## 2024-10-28 RX ADMIN — Medication 500 MG: at 21:42

## 2024-10-28 RX ADMIN — ROSUVASTATIN CALCIUM 20 MG: 20 TABLET, FILM COATED ORAL at 07:52

## 2024-10-28 RX ADMIN — Medication 2 CAPSULE: at 20:38

## 2024-10-28 RX ADMIN — ACETAMINOPHEN 650 MG: 325 TABLET, FILM COATED ORAL at 08:03

## 2024-10-28 RX ADMIN — ACETAMINOPHEN 650 MG: 325 TABLET, FILM COATED ORAL at 14:07

## 2024-10-28 RX ADMIN — IPRATROPIUM BROMIDE AND ALBUTEROL SULFATE 3 ML: .5; 3 SOLUTION RESPIRATORY (INHALATION) at 07:43

## 2024-10-28 RX ADMIN — LOPERAMIDE HYDROCHLORIDE 2 MG: 2 CAPSULE ORAL at 08:03

## 2024-10-28 RX ADMIN — IPRATROPIUM BROMIDE AND ALBUTEROL SULFATE 3 ML: .5; 3 SOLUTION RESPIRATORY (INHALATION) at 15:46

## 2024-10-28 RX ADMIN — APIXABAN 5 MG: 5 TABLET, FILM COATED ORAL at 07:52

## 2024-10-28 RX ADMIN — Medication 2 CAPSULE: at 07:53

## 2024-10-28 RX ADMIN — HUMAN ALBUMIN MICROSPHERES AND PERFLUTREN 6 ML: 10; .22 INJECTION, SOLUTION INTRAVENOUS at 14:15

## 2024-10-28 RX ADMIN — PREDNISONE 5 MG: 5 TABLET ORAL at 07:52

## 2024-10-28 RX ADMIN — ASPIRIN 81 MG: 81 TABLET ORAL at 07:52

## 2024-10-28 RX ADMIN — FUROSEMIDE 20 MG: 20 TABLET ORAL at 07:53

## 2024-10-28 RX ADMIN — IPRATROPIUM BROMIDE AND ALBUTEROL SULFATE 3 ML: .5; 3 SOLUTION RESPIRATORY (INHALATION) at 12:07

## 2024-10-28 RX ADMIN — AZITHROMYCIN DIHYDRATE 250 MG: 250 TABLET, FILM COATED ORAL at 07:51

## 2024-10-28 RX ADMIN — Medication 60 ML: at 08:00

## 2024-10-28 RX ADMIN — LOPERAMIDE HYDROCHLORIDE 2 MG: 2 CAPSULE ORAL at 14:07

## 2024-10-28 RX ADMIN — FLUDROCORTISONE ACETATE 0.1 MG: 0.1 TABLET ORAL at 07:51

## 2024-10-28 RX ADMIN — POTASSIUM CHLORIDE 20 MEQ: 750 TABLET, EXTENDED RELEASE ORAL at 23:14

## 2024-10-28 RX ADMIN — CEFTRIAXONE SODIUM 1 G: 1 INJECTION, POWDER, FOR SOLUTION INTRAMUSCULAR; INTRAVENOUS at 16:34

## 2024-10-28 RX ADMIN — LOPERAMIDE HYDROCHLORIDE 2 MG: 2 CAPSULE ORAL at 23:14

## 2024-10-28 NOTE — CONSULTS
"      [facility] Consult Service Note  Interventional Radiology  10/28/24   8:41 AM    Consult Requested: \"internal jugular single lumen PICC\"    Recommendations/Plan:    Patient is approved for IR TCVC, single lumen. Team would like to have further multidisciplinary discussion prior to placement. IR would not recommend PICC line placement in setting of CKD. Would avoid PICC line placement as it is a contraindication.  Team will notify us when they develop a plan.     Case and imaging discussed with IR attending Dr. Ab Singleton   Recommendations were reviewed with Jovanny Rubio.     ADDENDUM 10/28/24:  Per primary team, Dr. Rubio, plan will be to hold off on line placement today. Plan is to hold off on line placement for now.     History of Present Illness:  Aubrey Duncan is a 69 year old male. He has CAD, DLBCL of esophagus (s/p rituximab), HLD, DM2, CKD 3b, Bilateral lung transplant 2/2 COPD and Alpha-1-antitrypsin deficiency in 2013, GERD, Anemia, and recent BKA who presents for sepsis. Pt initially presented to [facility] in [place] for lethargy on 10/24/24 transferred to our facility on 10/26 for further workup of shock.     Per vascular access nurse notes on 10/25/24: \"Attempted placement on Left Basilic vein first. Pt has had numerous Picc lines placed successfully. Bilateral arms are covered with bruises. Access gained on first attempt, but wire would not pass beyond left shoulder. Accessed the Brachial, Basilic and Cephalic veins on the left arm and nothing would thread beyond shoulder. A whole new set-up was done for the Right side. Accessed Basilic vein successfully and wire passed, but catheter would not thread beyond Right Clavicle. One last access into the Right Cephalic vein was successful, but again, picc would not thread, although wire went smoothly. This patient has bilateral lung transplants. MD updated as to status. Patient has 2 working PIVs at this point. MD is " "deferring internal jugular due to Eliquis therapy\"    Pertinent Imaging Reviewed:   CT CAP 10/25/24  Recent Results (from the past 24 hours)   XR Chest Port 1 View    Narrative    EXAMINATION: XR CHEST PORT 1 VIEW, 10/27/2024 8:49 AM    COMPARISON: 1/9/2024    HISTORY: worsening O2 requirement    FINDINGS: Changes of bilateral lung transplant. Cardiac silhouette is  enlarged. Interstitial opacities bilaterally have increased.      Impression    IMPRESSION: Increased size of the cardiac silhouette with increased  pulmonary edema.    JULISSA OLIVAS MD         SYSTEM ID:  [id number]       Expected date of discharge:  11/01/2024    Vitals:   BP (!) 154/90   Pulse 85   Temp 97.1  F (36.2  C) (Axillary)   Resp 20   Wt 65.3 kg (143 lb 15.4 oz)   SpO2 90%   BMI 22.55 kg/m      Pertinent Labs Reviewed:  CBC:  Lab Results   Component Value Date    WBC 3.8 (L) 10/27/2024    WBC 3.9 (L) 10/26/2024    WBC 3.9 (L) 10/26/2024    WBC 6.2 06/03/2021    WBC 6.6 05/12/2021    WBC 7.4 10/30/2020     Lab Results   Component Value Date    HGB 8.2 10/27/2024    HGB 8.4 10/26/2024    HGB 9.2 10/26/2024    HGB 13.0 06/03/2021    HGB 12.7 05/12/2021    HGB 13.2 10/30/2020     Lab Results   Component Value Date     10/27/2024     10/26/2024     10/26/2024     06/03/2021     05/12/2021     10/30/2020    INR:  Lab Results   Component Value Date    INR 1.59 (H) 10/27/2024    INR 1.00 10/30/2020    PTT 41 (H) 10/27/2024    PTT 30 09/21/2014          COVID Results:  COVID-19 Antibody Results, Testing for Immunity           No data to display              COVID-19 PCR Results          5/2/2023    10:25 1/11/2024    04:04 10/25/2024    12:35   COVID-19 PCR Results   SARS CoV2 PCR Negative  Negative  Negative     Potassium   Date Value Ref Range Status   10/27/2024 3.7 3.4 - 5.3 mmol/L Final   10/26/2022 4.5 3.5 - 5.1 mmol/L Final   06/03/2021 4.6 3.5 - 5.1 mmol/L Final          Syeda Irbahim, " MELLY  Interventional Radiology  Pager: [phone]

## 2024-10-28 NOTE — PLAN OF CARE
Goal Outcome Evaluation:  BP (!) 154/90   Pulse 85   Temp 97.1  F (36.2  C) (Axillary)   Resp 20   Wt 65.3 kg (143 lb 15.4 oz)   SpO2 90%   BMI 22.55 kg/m      Neuro: A&Ox4.   Cardiac: SR, 80-90s. -150s/70-90s. Afebrile.  Respiratory: Sating <92% on RA-2L oxyplus mask.  GI/: Adequate urine output via primofit. Loose BM X1. PRN imodium given x1.  Diet/appetite: Tolerating consistent carb diet.   Activity:  Lift. Not oob this shift.  Pain: Pain in L leg between 3-5 this shift. PRN tylenol given x1.  Skin: Left leg dressing done this morning per WOC orders.  Right arm weeping.  LDA's: R PIV. Primofit.     Plan: Continue with POC.

## 2024-10-28 NOTE — CONSULTS
Care Management Initial Consult    General Information  Assessment completed with: Patient, Spouse,  Kyung  Type of CM/SW Visit: Initial Assessment    Primary Care Provider verified and updated as needed: Yes   Readmission within the last 30 days: previous discharge plan unsuccessful   Return Category: Progression of disease  Reason for Consult: utilization management concerns  Advance Care Planning: Advance Care Planning Reviewed: present on chart, no concerns identified       Communication Assessment  Patient's communication style: spoken language (English or Bilingual)    Hearing Difficulty or Deaf: yes   Wear Glasses or Blind: yes    Cognitive  Cognitive/Neuro/Behavioral: (P) WDL  Level of Consciousness: lethargic  Arousal Level: (P) opens eyes spontaneously  Orientation: (P) oriented x 4  Mood/Behavior: (P) calm, cooperative  Best Language: (P) 0 - No aphasia  Speech: (P) spontaneous, logical, garbled    Living Environment:   People in home: significant other, child(alix), adult (Pt and wife reported that they are selling their house in two days and willl be living with their daughter Nikole. Pt was at the [facility] and he has a bed hold there.)  Current living Arrangements: extended care facility  Name of Facility: [facility]   Able to return to prior arrangements: yes  Living Arrangement Comments: Pt's plan is to return to [facility].    Family/Social Support:  Care provided by: spouse/significant other, child(alix) (Care was provided by wife and daughter at home but Pt was receiving help from TCU staff while at [facility].)  Provides care for: no one, unable/limited ability to care for self  Marital Status:   Support system: Wife, Kyung and Children   Description of Support System: Supportive, Involved    Support Assessment: Adequate social supports, Adequate family and caregiver support    Current Resources:   Patient receiving home  care services: No        Community Resources: Transitional Care  Equipment currently used at home: commode chair, grab bar, toilet, hospital bed, shower chair, wheelchair, manual, walker, rolling  Supplies currently used at home: None    Employment/Financial:  Employment Status: retired        Financial Concerns: none   Referral to Financial Worker: No       Does the patient's insurance plan have a 3 day qualifying hospital stay waiver?  No    Lifestyle & Psychosocial Needs:  Social Drivers of Health     Food Insecurity: Low Risk  (10/26/2024)    Food Insecurity     Within the past 12 months, did you worry that your food would run out before you got money to buy more?: No     Within the past 12 months, did the food you bought just not last and you didn t have money to get more?: No   Depression: Not at risk (8/20/2024)    PHQ-2     PHQ-2 Score: 0   Housing Stability: Low Risk  (10/26/2024)    Housing Stability     Do you have housing? : Yes     Are you worried about losing your housing?: No   Tobacco Use: Medium Risk (9/20/2024)    Patient History     Smoking Tobacco Use: Former     Smokeless Tobacco Use: Never     Passive Exposure: Past   Financial Resource Strain: Low Risk  (10/26/2024)    Financial Resource Strain     Within the past 12 months, have you or your family members you live with been unable to get utilities (heat, electricity) when it was really needed?: No   Alcohol Use: Not At Risk (9/10/2019)    AUDIT-C     Frequency of Alcohol Consumption: Never     Average Number of Drinks: Not on file     Frequency of Binge Drinking: Not on file   Transportation Needs: Low Risk  (10/26/2024)    Transportation Needs     Within the past 12 months, has lack of transportation kept you from medical appointments, getting your medicines, non-medical meetings or appointments, work, or from getting things that you need?: No   Physical Activity: Not on file   Interpersonal Safety: Low Risk  (10/26/2024)    Interpersonal  Safety     Do you feel physically and emotionally safe where you currently live?: Yes     Within the past 12 months, have you been hit, slapped, kicked or otherwise physically hurt by someone?: No     Within the past 12 months, have you been humiliated or emotionally abused in other ways by your partner or ex-partner?: No   Stress: Not on file   Social Connections: Not on file   Health Literacy: Not on file       Functional Status:  Prior to admission patient needed assistance:   Dependent ADLs:: Bathing, Dressing, Grooming, Incontinence, Positioning, Transfers, Wheelchair-with assist, Toileting  Dependent IADLs:: Cleaning, Cooking, Laundry, Shopping, Meal Preparation, Medication Management, Money Management, Transportation  Assesssment of Functional Status: Not at baseline with ADL Functioning, Not at  functional baseline    Mental Health Status:  Mental Health Status: No Current Concerns       Chemical Dependency Status:  Chemical Dependency Status: No Current Concerns             Values/Beliefs:  Spiritual, Cultural Beliefs, Ep[employer] Practices, Values that affect care: no               Discussed  Partnership in Safe Discharge Planning  document with patient/family: No    Additional Information:  ELIZABETH met with Pt and wifeKyung at bedside and introduced self, role, and purpose of assessment. Pt [facility] that [facility] and has [facility] bed hold there. Pt' wife shared that they are selling their house in two days and will be living with their daughter Nikole after discharge from [facility]. Pt's stated that he was dependent on ADL's and IADL's  with wife and daughter providing assistance.   SW called the nursing station at [facility] to confirm bed hold and they reported pt's bed is being held until he returns from hospital.   Next Steps:     - Follow up with [facility] prior to discharge and send discharge orders when pt is med ready.   - Arrange  transportation if needed.     SW will continue to follow as needed.    ROBERTO Shetty, [facility]  ED/OBS   [facility]  Phone: [phone]  Fax: [phone]

## 2024-10-28 NOTE — PROGRESS NOTES
CLINICAL NUTRITION SERVICES - ASSESSMENT NOTE     Nutrition Prescription    RECOMMENDATIONS FOR MDs/PROVIDERS TO ORDER:  Messaged primary team over Vocera with recommendation of diet liberalization to regular (hypoglycemia in early AM hours, A1C 4.3% (WNL) 8/25/24); consider if insulin regimen adjustments needed, if continues with intermittent hypoglycemia (has long-acting, sliding scale, and fixed regular insulin dose orders).     Malnutrition Status:    Patient does not meet two of the established criteria necessary for diagnosing malnutrition but is at risk for malnutrition    Recommendations already ordered by Registered Dietitian (RD):  Oral nutrition supplement/snack list provided to patient during visit. Allow pt to request ONS on PRN basis (pt does not want scheduled supplements at this time)    Begin order for Special K bar at HS for night-time CHO source, AM hypoglycemia prevention    Discontinued Thiamine supplement, not indicated. Replaced with a multivitamin with minerals which will cover thiamine needs + provide source of Vit A, C, E, Zn to assist with wound healing. D/W primary team MD, ordered under staff recommendation mode per protocol without formal RD consult.     Continue Expedite wound supplement daily until skin healed from BKA site     Future/Additional Recommendations:  Monitor nutrition-related findings and follow pt per protocol     REASON FOR ASSESSMENT  Aubrey Duncan is a/an 69 year old male assessed by the dietitian for Nutrition Risk Monitoring (MST score 4 points, 3 points for weight loss 24-33 lbs and 1 pt for decreased appetite)    CLINICAL HISTORY  Hx of CAD, DLBCL of esophagus, HLD, T2DM,  CKD IIIB, bilateral lung txp (2013), GERD, anemia, and recent BKA, presenting to [facility] due to sepsis.     NUTRITION HISTORY  Met with patient at bedside. Fair appetite reported.  Follows a regular diet at baseline. Generally eats 2-3 meals per day at baseline; eating 3 meals per day in the  hospital. Reports consuming high protein foods with each meal. Has been taking Expedite wound healing supplement daily since BKA. Does not tolerate protein shake supplements, contribute to loose stools (although has loose stools at baseline, since his transplant). Reports taking Imodium 1-2+ times per day to control diarrhea. Is not interested in trying Banatrol or fiber modulars orally at this time, but did discuss w/ pt during visit (may help to bulk stool without med). Weight reported as stable, with exception of loss from recent BKA.     Has been experiencing hypoglycemia at times during current hospitalization. Reports having a stable insulin regimen PTA, BG well controlled. Pt reports his PCP manages his insulin at baseline (no recent changes reported). Hypoglycemia has been occurring in the early morning hours, so starting a CHO-containing snack at HS to help optimize glycogen stores.     CURRENT NUTRITION ORDERS  Diet: Moderate Consistent Carbohydrate (60 gm CHO per meal)  Supplements: Expedite bottle daily (wound healing supplement/modular)  Intake/Tolerance: % intake documented for most meals, per I/O since admission. Per review of room service selections, ordering 3 large meals per day so far.     LABS: Reviewed  - Hypoglycemia noted yesterday AM; closely monitor trends  - A1C 4.3% in August 2023 (WNL)  Electrolytes  Potassium (mmol/L)   Date Value   10/29/2024 3.5   10/28/2024 3.4   10/28/2024 3.4   10/26/2022 4.5   10/12/2022 4.8   10/04/2022 5.5 (H)   06/03/2021 4.6   05/12/2021 4.6   10/30/2020 5.1     Phosphorus (mg/dL)   Date Value   10/27/2024 3.3   10/26/2024 3.7   10/25/2024 2.9   04/16/2024 3.0   11/17/2022 3.7   10/30/2020 4.1   12/19/2017 3.4   07/20/2017 3.8   04/19/2017 3.3   10/06/2014 3.5    Blood Glucose  Glucose (mg/dL)   Date Value   10/29/2024 100 (H)   10/28/2024 110 (H)   10/26/2022 92   10/12/2022 71   10/04/2022 88   09/19/2022 92   09/16/2022 83   06/03/2021 96   05/12/2021  79   10/30/2020 91   07/02/2020 109 (H)   06/30/2020 63 (L)     GLUCOSE BY METER POCT (mg/dL)   Date Value   10/29/2024 102 (H)   10/29/2024 91   10/29/2024 72   10/29/2024 65 (L)   10/28/2024 83     Hemoglobin A1C (%)   Date Value   08/25/2024 4.2   04/03/2024 <4.0 (L)   01/11/2024 4.3   06/08/2023 4.2   11/17/2022 4.3   06/21/2021 5.2   10/30/2020 5.1   06/30/2020 5.4   12/12/2019 5.4   09/10/2019 5.3    Inflammatory Markers  CRP Inflammation (mg/L)   Date Value   07/02/2020 0.4   02/24/2019 7.9 (H)   10/07/2017 <2.9     WBC (10e9/L)   Date Value   06/03/2021 6.2   05/12/2021 6.6   10/30/2020 7.4     WBC Count (10e3/uL)   Date Value   10/28/2024 7.1   10/27/2024 3.8 (L)   10/26/2024 3.9 (L)     Albumin (g/dL)   Date Value   10/27/2024 2.7 (L)   10/26/2024 2.4 (L)   10/26/2024 2.2 (L)   10/12/2022 3.9   08/12/2022 3.9   07/21/2022 3.3 (L)   05/12/2021 3.8   10/30/2020 3.9   07/02/2020 3.6      Magnesium (mg/dL)   Date Value   10/29/2024 1.7   10/28/2024 2.3   10/27/2024 2.6 (H)   06/03/2021 1.8 (L)   05/12/2021 1.5 (L)   10/30/2020 1.5 (L)     Sodium (mmol/L)   Date Value   10/29/2024 141   10/28/2024 144   10/28/2024 146 (H)   06/03/2021 140   05/12/2021 141   10/30/2020 141    Renal  Urea Nitrogen (mg/dL)   Date Value   10/29/2024 28.4 (H)   10/28/2024 31.4 (H)   10/28/2024 36.7 (H)   10/26/2022 36 (H)   10/12/2022 44 (H)   10/04/2022 42 (H)   06/03/2021 38 (H)   05/12/2021 41 (H)   10/30/2020 34 (H)     Creatinine (mg/dL)   Date Value   10/29/2024 0.74   10/28/2024 0.86   10/28/2024 0.93   06/03/2021 1.29   05/12/2021 1.22   10/30/2020 1.22     Additional  Triglycerides (mg/dL)   Date Value   04/03/2024 284 (H)   01/11/2024 179 (H)   11/17/2022 135   10/30/2020 175 (H)   08/29/2019 154 (H)   06/04/2019 176 (H)     Ketones Urine (mg/dL)   Date Value   10/25/2024 Negative   01/27/2020 Negative           GI:  Last BM: 10/29  GI concerns: chronic diarrhea, takes Imodium on daily basis PTA per report      Pertinent  "Medications  Insulin Aspart with meals TID, + sliding scale insulin + Lantus at 0730 daily (insulin regimen is PTA per pharmD)  Protonix  Prednisone  Metamucil BID  Thiamine 100 mg daily (hx thiamine deficiency? No B1 labs available)  Expedite wound supplement daily   Bowel meds: PRN Senokot     SKIN  WOCN note 10/28 - see for detail if needed  Recent L BKA 9/25/24 - initial assessment      ANTHROPOMETRICS  Height: 5'7\"   Admit wt 65.3 kg (143 lb 15.4 oz) 10/26/24  Most Recent Weight: 65.3 kg (143 lb 15.4 oz)  IBW: 63.3 kg (adj IBW per L BKA)  103%IBW   BMI: Normal BMI    Weight History:   - Note, L BKA 9/25/24  Wt Readings from Last 15 Encounters:   10/29/24 64.6 kg (142 lb 6.7 oz)   10/26/24 64 kg (141 lb 1.5 oz)   09/29/24 64.8 kg (142 lb 13.7 oz)   09/20/24 61.2 kg (135 lb)   09/18/24 60.4 kg (133 lb 3.2 oz)   09/16/24 64.5 kg (142 lb 3.2 oz)   09/12/24 64.5 kg (142 lb 3.2 oz)   09/09/24 109.9 kg (242 lb 3.2 oz)   09/05/24 62 kg (136 lb 9.6 oz)   08/27/24 67.7 kg (149 lb 4 oz)   08/20/24 65.3 kg (144 lb)   08/13/24 65.3 kg (144 lb)   08/07/24 68.2 kg (150 lb 6.4 oz)   08/02/24 69.4 kg (153 lb)   08/01/24 69.7 kg (153 lb 9.6 oz)       Dosing Weight: 65.3 kg (admit wt, note, adj IBW for BKA of 63.3 kg)    ASSESSED NUTRITION NEEDS  Estimated Energy Needs: 4351-1634 kcals/day (30 - 35 kcals/kg )  Justification: Wound healing  Estimated Protein Needs:  grams protein/day (1.2 - 1.5 grams of pro/kg)  Justification: Wound healing  Estimated Fluid Needs:  (1 mL/kcal)   Justification: Maintenance/wound healing    PHYSICAL FINDINGS  See malnutrition section below.      MALNUTRITION  % Intake: No decreased intake noted  % Weight Loss: None noted  Subcutaneous Fat Loss: None observed  Muscle Loss: Thoracic region (clavicle, acromium bone, deltoid, trapezius, pectoral) mild and Upper arm (bicep, tricep): severe (though pt reports no recent changes, remote loss of muscle mass)  Fluid Accumulation/Edema: Does not meet " criteria  Malnutrition Diagnosis: Patient does not meet two of the established criteria necessary for diagnosing malnutrition but is at risk for malnutrition    NUTRITION DIAGNOSIS  Increased nutrient needs (energy/protein/micronutrients) related to recent BKA, surgical wound healing needs as evidenced by recent BKA 9/25/24, WOCN following for wound healing      INTERVENTIONS  Implementation  Nutrition education for nutrition relationship to health/disease (increased needs for wound healing)  Collaboration with other providers - Primary team MD   Medical food supplement therapy - PRN (defer ONS per pt preference)  Modify composition of meals/snacks - Protein bar at HS   Multivitamin/mineral supplement therapy - Discontinued B1, Begin MVI/M to support wound healing and for maintenance B1 needs     Goals  Patient to consume % of nutritionally adequate meal trays TID, or the equivalent with supplements/snacks.     Monitoring/Evaluation  Progress toward goals will be monitored and evaluated per protocol.  Brian Burt, MS, RDN, LD, CNSC  Available by Osen or phone   Vocera: M-F (7:00-3:30)  6B Clinical Dietitian  or 1A Obs Clinical Dietitian  Weekend/Holiday (7:00-3:30) - Weekend Clinical Dietitian   6B RD desk: [phone]       **Clinical Dietitians are no longer available by pager.

## 2024-10-28 NOTE — PROGRESS NOTES
Pulmonary Medicine  Cystic Fibrosis - Lung Transplant Team  Progress Note  10/28/2024     Patient: Aubrey Duncan  MRN: [id number]  : 1955 (age 69 year old)  Transplant: 2013 (Lung), POD#[id number]  Admission date: 10/26/2024    Assessment & Plan:     Aubrey Duncan is a 69 year old male with a history of bilateral lung transplant for A1AT deficiency on 2013.  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  Doing well there until 10/24 when he developed increased lethargy ad confusion.  He was seen in the [facility] ER with CXR concerning for pneumonia and UA with bacteria/WBCs concerning for UTI.  He was given IVF and ceftriaxone and transferred to [facility].  Upon admission, he was septic and was started on broad spectrum antibiotics.  CT C/A/P demonstrated RUL/RML opacities.  ID was consulted and doxycycline was added to cover prior Stenotrophomonas.  He became unstable with hypotension, increasing lactate, and metabolic acidosis and was transferred to the ICU where he received 5L IVF and stress dose steroids.  The patient was transferred to [facility] on 10/26 for suspected urosepsis.    Today's recommendations:   - DSA (10/27) pending  - Histo galactomannan Ag urine (10/26) pending  - Fungitell, fungal Ab, Histo blood, A. galactomannan blood (10/25) all pending  - Bacterial sputum culture ordered  - Diuresis per primary  - Echo  - Wean supplemental oxygen to maintain SpO2 >92%  - CXR today with as below  - Consider SLP and/or GI involvement as pt. reporting frequent coughing with PO intake and certain foods feeling stuck in esophagus  - Tacrolimus level to trend supratherapeutic although down trending, decreased dose ordered to resume 10/29 morning, daily levels  ordered through 11/1  - Hold chronic azithromycin once transition to ciprofloxacin until course complete  - CMV weekly monitoring x4 given recent stress dose steroids (ordered, next 11/1)    - ABX per transplant ID    Acute hypoxic respiratory failure:  Concern for RUL/RML pneumonia:  S/p bilateral sequential lung transplant (BSLT) for A1AT:  Encephalopathy: Last seen by Dr. Murphy in May and at that time, PFTs had improved to his recent best (in two years).  Was doing well until admission from [facility].  Symptoms at that time unclear as pt. was quite lethargic.  Intermittently on RA-2L.  Upon admission to [facility], CRP, lactic acid, and procal remain elevated although improving.  Stress dose steroids stopped 10/27.  MRSA swab, covid/influenza/RSV, Legionella/Strep pneumoniae (10/25) all negative, respiratory panel (10/26) negative.  DSA (11/17/23) and EBV (10/27) negative.  Of note, tacrolimus level was supratherapeutic and likely contributing to encephalopathy.  Was on RA upon admission, but noted increased dyspnea with need for 2L O2 overnight, suspect secondary to volume overload from IVF and albumin.  SCXR (10/27) with increased pulmonary edema.  - DSA (10/27) pending  - Histo galactomannan Ag urine (10/26) pending  - Fungitell, fungal Ab, Histo blood, A. galactomannan blood (10/25) all pending  - Blood culture (10/25) NGTD  - Bacterial sputum culture ordered  - Diuresis per primary  - Echo  - ABX as below per transplant ID  - Nebs: Duoneb QID  - Wean supplemental oxygen to maintain SpO2 >92%  - CXR today with decreased pulmonary edema and small increased left pleural effusion (personally reviewed)  - Consider SLP and/or GI involvement as pt. reporting frequent coughing with PO intake and certain foods feeling stuck in esophagus     Immunosuppression:  - Tacrolimus held since 10/26 evening given elevated level (prior 3 mg BID).  Goal level 8-10.  Level to trend 10/28 supratherapeutic although down trending,  resume at decreased dose of 1 mg BID starting 10/29 morning, daily levels ordered through 11/1.  - Prednisone 5 mg qAM and 2.5 mg qPM (stress dose steroids stopped per primary team)     Prophylaxis:   - Dapsone for PJP ppx  - S/p acyclovir for shingles prophylaxis s/p rituximab per chart (started on 4/23, stopped 10/27)     CLAD: PTA azithromycin 250 mg three times/week (for QTc of 460) --> hold once transitions to ciprofloxacin until course complete, Breo (substitute for Advair), and montelukast.     H/o recurrent CMV viremia: Last level (10/25) negative.  Per OP notes, pt. had been on chronic valcyte, but this was stopped secondary to cost.  - CMV weekly monitoring x4 given recent stress dose steroids (ordered, next 11/1)      Hypogammaglobulinemia: IgG low although adequate at 552 on 10/27, no indication for IVIG at this time.     Other relevant problems being managed by the primary team:    Probable urosepsis:  Probable prostatitis: Urine culture 10/24 with E. coli and Klebsiella pneumoniae.  Urine culture 10/25 negative.  Also with encephalopathy as above.  Transplant ID consulted, see their note for details.   - ABX: IV ceftriaxone (10/27) --> plan to transition to PO ciprofloxacin at discharge for 14d course per transplant ID, s/p minocycline (10/26-10/28, h/o Steno), IV Zosyn (10/25-10/26), doxycycline (10/25-10/26), and IV vancomycin (10/25-10/27)    Pancytopenia: Possibly secondary to infection, now with WBC 7.1 and improvement in platelets on 10/28.   - Management per primary    We appreciate the excellent care provided by the [place] [id number] team.  Recommendations communicated via Vocera, telephone, and this note.  Will continue to follow along closely, please do not hesitate to call with any questions or concerns.    Patient discussed with Dr. Cristina.    Delma Henry PA-C  Inpatient [facility]  Pulmonary CF/Transplant     Subjective & Interval History:     On RA this morning, intermittently on 2L.  Ongoing  dyspnea, cough minimal and nonproductive.  Some coughing with PO intake, also feels like food getting stuck in esophagus at times.  Having loose stools.  Receiving diuresis.      Review of Systems:     C: No fever, no chills  INTEGUMENTARY/SKIN: No rash or obvious new lesions  ENT/MOUTH: No sore throat, no sinus pain, no nasal congestion or drainage  RESP: See interval history  CV: No chest pain, no peripheral edema  GI: No nausea, no vomiting  : + external catheter  MUSCULOSKELETAL: + left leg pain  ENDOCRINE: + blood sugars with variable control  NEURO: No headache  PSYCHIATRIC: Mood stable    Physical Exam:     All notes, images, and labs from past 24 hours (at minimum) were reviewed.    Vital signs:  Temp: 98  F (36.7  C) Temp src: Oral BP: (!) 157/97 Pulse: 100   Resp: 20 SpO2: 91 % O2 Device: Nasal cannula Oxygen Delivery: 1.5 LPM   Weight: 65.3 kg (143 lb 15.4 oz)  I/O:   Intake/Output Summary (Last 24 hours) at 10/28/2024 1640  Last data filed at 10/28/2024 1100  Gross per 24 hour   Intake 5 ml   Output 1900 ml   Net -1895 ml     Constitutional: Lying upright in bed, in no apparent distress.   HEENT: Eyes with pink conjunctivae, anicteric.  Oral mucosa moist without lesions.   PULM: Fair air flow bilaterally.  R>L crackles.  No rhonchi, no wheezes.  Non-labored breathing on RA.  CV: Normal S1 and S2.  RRR.  No murmur, gallop, or rub.  No peripheral edema.   ABD: NABS, soft, nondistended.    MSK: No apparent muscle wasting.   NEURO: Alert and conversant.   SKIN: Warm, dry.  Extensive ecchymosis to bilateral arms.  PSYCH: Mood stable.     Data:     LABS    CMP:   Recent Labs   Lab 10/28/24  1124 10/28/24  0850 10/28/24  0819 10/28/24  0755 10/28/24  0417 10/27/24  0916 10/27/24  0534 10/26/24  1829 10/26/24  1753 10/26/24  1128 10/26/24  0739 10/26/24  0728 10/26/24  0533 10/25/24  1859 10/25/24  1652 10/25/24  0119 10/25/24  0024   NA  --   --   --   --  146*  --  140  --  138  --   --   --  140  --  135    < > 134*   POTASSIUM  --   --   --   --  3.0*  --  3.7  --  3.2*  --   --   --  3.8  --  4.8  4.8   < > 4.4   CHLORIDE  --   --   --   --  110*  --  106  --  102  --   --   --  104  --  103   < > 103   CO2  --   --   --   --  22  --  23  --  21*  --   --   --  21*  --  17*   < > 17*   ANIONGAP  --   --   --   --  14  --  11  --  15  --   --   --  15  --  15   < > 14   * 84 76 48* 45*   < > 150*   < > 151*   < >  --    < > 198*   < > 119*   < > 78   BUN  --   --   --   --  36.7*  --  36.3*  --  30.0*  --   --   --  22.1  --  23.1*   < > 30.1*   CR  --   --   --   --  0.93  --  1.00  --  1.02  --   --   --  0.79  --  0.92   < > 0.82   GFRESTIMATED  --   --   --   --  88  --  80  --  79  --   --   --  >90  --  89   < > >90   ERNI  --   --   --   --  7.6*  --  7.4*  --  7.5*  --   --   --  7.6*  --  7.6*   < > 7.7*   MAG  --   --   --   --  2.3  --  2.6*  --  2.7*  --  1.4*  --  1.4*  --   --   --  1.5*   PHOS  --   --   --   --   --   --  3.3  --   --   --   --   --  3.7  --   --   --  2.9   PROTTOTAL  --   --   --   --   --   --  4.8*  --  4.8*  --   --   --  4.5*  --  4.7*   < > 5.1*   ALBUMIN  --   --   --   --   --   --  2.7*  --  2.4*  --   --   --  2.2*  --  2.2*   < > 2.5*   BILITOTAL  --   --   --   --   --   --  0.5  --  0.4  --   --   --  0.4  --  0.5   < > 0.5   ALKPHOS  --   --   --   --   --   --  110  --  117  --   --   --  104  --  113   < > 138   AST  --   --   --   --   --   --  45  --  47*  --   --   --  47*  --  48*   < > 38   ALT  --   --   --   --   --   --  24  --  29  --   --   --  33  --  34   < > 43    < > = values in this interval not displayed.     CBC:   Recent Labs   Lab 10/28/24  0913 10/27/24  0534 10/26/24  5376 10/26/24  6202   WBC 7.1 3.8* 3.9* 3.9*   RBC 3.43* 2.65* 2.72* 2.98*   HGB 10.6* 8.2* 8.4* 9.2*   HCT 35.5* 26.5* 27.1* 29.6*   * 100 100 99   MCH 30.9 30.9 30.9 30.9   MCHC 29.9* 30.9* 31.0* 31.1*   RDW 16.6* 16.4* 16.4* 16.3*    146* 141* 147*       INR:  "  Recent Labs   Lab 10/27/24  0534   INR 1.59*       Glucose:   Recent Labs   Lab 10/28/24  1124 10/28/24  0850 10/28/24  0819 10/28/24  0755 10/28/24  0417 10/27/24  2133   * 84 76 48* 45* 113*       Blood Gas:   Recent Labs   Lab 10/27/24  0533 10/26/24  0533 10/25/24  2344   PHV 7.47* 7.46* 7.41   PCO2V 36* 33* 33*   PO2V 70* 48* 90*   HCO3V 26 24 21   CAMPBELL 2.5 0.2 -3.1*   O2PER 2 0 28       Culture Data No results for input(s): \"CULT\" in the last 168 hours.    Virology Data:   Lab Results   Component Value Date    INFLUA Negative 01/28/2014    FLUAH1 Not Detected 10/26/2024    FLUAH3 Not Detected 10/26/2024    YC8035 Not Detected 10/26/2024    IFLUB Not Detected 10/26/2024    RSVA Not Detected 10/26/2024    RSVB Not Detected 10/26/2024    PIV1 Not Detected 10/26/2024    PIV2 Not Detected 10/26/2024    PIV3 Not Detected 10/26/2024    HMPV Not Detected 10/26/2024    HRVS Negative 02/24/2019    ADVBE Negative 02/24/2019    ADVC Negative 02/24/2019    ADVC Negative 08/05/2014    ADVC Negative 06/03/2014       Historical CMV results (last 3 of prior testing):  Lab Results   Component Value Date    CMVQNT Not Detected 10/25/2024    CMVQNT Not Detected 07/12/2024    CMVQNT Not Detected 05/16/2024     Lab Results   Component Value Date    CMVLOG Not Calculated 06/03/2021    CMVLOG Not Calculated 05/12/2021    CMVLOG Not Calculated 02/19/2020       Urine Studies    Recent Labs   Lab Test 10/25/24  1250 08/29/24  1522   URINEPH 5.0 5.0   NITRITE Negative Negative   LEUKEST Trace* Negative   WBCU 12* 1       Most Recent [place] Pulmonary Function Testing (FVC/FEV1 only)  FVC-Pre   Date Value Ref Range Status   05/16/2024 3.69 L    11/17/2023 3.67 L    05/25/2023 3.74 L    11/17/2022 3.68 L      FVC-%Pred-Pre   Date Value Ref Range Status   05/16/2024 101 %    11/17/2023 100 %    05/25/2023 93 %    11/17/2022 91 %      FEV1-Pre   Date Value Ref Range Status   05/16/2024 3.03 L    11/17/2023 2.93 L    05/25/2023 2.94 L  "   2022 2.80 L      FEV1-%Pred-Pre   Date Value Ref Range Status   2024 108 %    2023 104 %    2023 96 %    2022 91 %        IMAGING    Recent Results (from the past 48 hours)   XR Chest Port 1 View    Narrative    EXAMINATION: XR CHEST PORT 1 VIEW, 10/27/2024 8:49 AM    COMPARISON: 2024    HISTORY: worsening O2 requirement    FINDINGS: Changes of bilateral lung transplant. Cardiac silhouette is  enlarged. Interstitial opacities bilaterally have increased.      Impression    IMPRESSION: Increased size of the cardiac silhouette with increased  pulmonary edema.    JULISSA OLIVAS MD         SYSTEM ID:  [id number]   XR Chest Port 1 View    Narrative    Exam: XR CHEST PORT 1 VIEW, 10/28/2024 10:42 AM    Comparison: Chest radiograph, 10/27/2024    History: assess interval change in pulmonary edema    Findings:  Frontal semiupright radiograph of the chest was obtained. Postsurgical  changes of bilateral lung transplantation with unchanged appearance of  clamshell sternotomy wires. Stable cardiac silhouette. Decreased  vascular congestion and Kerley B lines compared with prior  examination. Left-sided costophrenic blunting. The right costophrenic  angle is clear. No pneumothorax. Improved basilar predominant hazy  airspace opacities.      Impression    Impression:   1. Decreased pulmonary edema, with mild residual edematous changes  and/or atelectatic changes.  2. Small left pleural effusion, increased from prior examination.    I have personally reviewed the examination and initial interpretation  and I agree with the findings.    JACOBY SEVILLA MD         SYSTEM ID:  [id number]   Echo Complete   Result Value    LVEF  35-40% (moderately reduced)    Narrative    [id number]  [facility]  [id number]  [id number]^PAUL^ZACHARIAH^ISABELLA     [facility],[place]  Echocardiography Laboratory  [address] [place] [id number]     Name: YARED HAMLIN  MRN: [id number]  :  1955  Study Date: 10/28/2024 01:56 PM  Age: 69 yrs  Gender: Male  Patient Location: [facility]  Reason For Study: Dyspnea  Ordering Physician: ZACHARIAH MILLER  Referring Physician: KATE PHILIPPE  Performed By: Camrelita Walsh     BSA: 1.8 m2  Height: 67 in  Weight: 143 lb  HR: 105  BP: 159/97 mmHg  ______________________________________________________________________________  Procedure  Complete Portable Echo Adult. Contrast Optison. Optison (NDC #3470-9566-17)  given intravenously. Patient was given 6 ml mixture of 3 ml Optison and 6 ml  saline. 3 ml wasted. The patient's rhythm is atrial fibrillation.  ______________________________________________________________________________  Interpretation Summary  Left ventricular function is moderately reduced. Biplane EF is 38%.  Global right ventricular function is normal.  No significant valvular abnormalities present.  The inferior vena cava was normal in size with preserved respiratory  variability.  Compared to the prior study on 1/11/2024, the LV function is now moderately  reduced.  ______________________________________________________________________________  Left Ventricle  Left ventricular size is normal. Left ventricular wall thickness is normal.  Thickening of the anterobasal septum is present. Left ventricular function is  decreased. The ejection fraction is 35-40% (moderately reduced). Grade I or  early diastolic dysfunction. Moderate diffuse hypokinesis is present. Regional  wall motion assessment limited by irregular rhythm.     Right Ventricle  The right ventricle is normal size. Global right ventricular function is  normal.     Atria  Both atria appear normal. The atrial septum is intact as assessed by color  Doppler .     Mitral Valve  Trace mitral insufficiency is present.     Aortic Valve  The aortic valve is tricuspid. No aortic regurgitation is present. No aortic  stenosis is present.     Tricuspid Valve  Trace tricuspid insufficiency is  present. The right ventricular systolic  pressure is 30mmHg above the right atrial pressure. Pulmonary artery systolic  pressure is normal.     Pulmonic Valve  The pulmonic valve is normal.     Vessels  The aortic root is mildly dilated to 3.7 cm. The ascending aorta is mildly  dilated to 3.7 cm. The inferior vena cava was normal in size with preserved  respiratory variability. The pulmonary artery cannot be assessed.     Pericardium  No pericardial effusion is present.     Miscellaneous  No significant valvular abnormalities present.     Compared to Previous Study  Compared to the prior study on 2024, the LV function is now moderately  reduced.     Attestation  I have personally viewed the imaging and agree with the interpretation and  report as documented by the fellow, Cornelius Chowdhury, and/or edited by me.  ______________________________________________________________________________  MMode/2D Measurements & Calculations  IVSd: 1.2 cm  LVIDd: 4.5 cm  LVIDs: 4.0 cm  LVPWd: 0.92 cm  FS: 11.6 %  LV mass(C)d: 162.3 grams  LV mass(C)dI: 92.6 grams/m2  Ao root diam: 3.7 cm  asc Aorta Diam: 3.7 cm  LVOT diam: 2.4 cm  LVOT area: 4.6 cm2     EF(MOD-bp): 38.4 %  Ao root diam index Ht(cm/m): 2.2  Ao root diam index BSA (cm/m2): 2.1  Asc Ao diam index BSA (cm/m2): 2.1     Asc Ao diam index Ht(cm/m): 2.2  RWT: 0.41  TAPSE: 1.5 cm     Doppler Measurements & Calculations  MV E max roberto: 39.8 cm/sec  MV A max roberto: 67.3 cm/sec  MV E/A: 0.59  PA acc time: 0.09 sec  TR max roberto: 272.7 cm/sec  TR max P.8 mmHg  E/E' av.0  Lateral E/e': 6.8  Medial E/e': 7.2  RV S Roberto: 8.9 cm/sec     ______________________________________________________________________________  Report approved by: Rach Contreras 10/28/2024 03:09 PM

## 2024-10-28 NOTE — PLAN OF CARE
BP (!) 147/91 (BP Location: Left arm, Cuff Size: Adult Regular)   Pulse 99   Temp 97.7  F (36.5  C) (Oral)   Resp 16   Wt 65.3 kg (143 lb 15.4 oz)   SpO2 (!) 89%   BMI 22.55 kg/m      Systems  - Neuro: A&Ox4. [place]. Uses glasses. Sleepy/Lethargic.  - Cardiac: VSS.   - Respiratory: Sating >93% on 2L Oxymask. DeSATs when sleeping and pulls O2 mask off but comes back up.  - GI/: Adequate urine output. BM X1. Imodium given x1. [place] on.  - Diet/appetite: Tolerating consistent carb diet. Eating well.  - Activity:  Bilateral BKA.   - Pain: At acceptable level on current regimen. Tylenol PRN given x1 for 5/10 pain; beneficial.  - Skin: No new deficits noted.    LDA's:  - R) PIV - saline locked    Shift Events:  [ - ]    Additional Notes:  - Pt has hearing aids that he keeps in a charging case at bedside throughout the night.    Plan:   PICC with IR tomorrow.      Goal Outcome Evaluation:      Plan of Care Reviewed With: patient    Overall Patient Progress: improvingOverall Patient Progress: improving    Outcome Evaluation: Pt on 2L OxyMask overnight to help SATs as NC moved off. Used bedpan x1. L) R) forearms botchy. R) Forearm still weeping.

## 2024-10-28 NOTE — PROGRESS NOTES
[facility]    Progress Note - Medicine Service, MAROON TEAM 1       Date of Admission:  10/26/2024    Assessment & Plan   Aubrey Duncan is a 69 year old male. He has CAD, DLBCL of esophagus (s/p rituximab), HLD, DM2, CKD 3b, Bilateral lung transplant 2/2 COPD and Alpha-1-antitrypsin deficiency in 2013, GERD, Anemia, and recent BKA who presents for sepsis. Pt initially presented to [facility] in [place] for lethargy on 10/24/24 transferred to our facility on 10/26 for further workup of shock. By the time of arrival at [facility], shock and encephalopathy had resolved. Transplant pulmonology and transplant ID were consulted and patient is receiving treatment for likely UTI.      Today:  - Transplant ID and Pulmonology following, appreciate recs  - continue ceftriaxone for E Coli UTI, stopped minocycline   - Difficulty with access and concern for infiltration of PIVs- currently still working but discussed obtaining possible central access. Discussed with IR and given patient's CKD, would avoid PICC. Plan to transition to PO antibiotics in next few days, so will avoid placement of central line for now. Discussed with IR and recommended line (if ultimately needed) would be single lumen cuffed low flow catheter tunneled line into the internal jugular vein.  - continue home PO lasix 20 mg daily  - TTE per pulmonology recs  - SLP consult to r/o aspiration  - stop fludracortisone: seems he was on this mostly for hyperkalemia rather than adrenal insufficiency    Acute hypoxic respiratory failure  H/o bilateral lung transplant 2/2 COPD and Alpha-1-antitrypsin deficiency  Transplanted in 2013 for A1AT deficency. 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 Has not had Tacrolimus level checked in >1 month prior to hospitalization. On check was found to be  significantly elevated at 39.8. Recheck today Tacro level was improved to 17.3. Had SOB with hypoxia overnight requiring 4L via NC. Improved after DuoNeb and with Lasix.   - Transplant Pulmonology following  - Continue BID Prednisone (5 and 2.5mg), Dapsone, Montelukast  - tacrolimus resumed per transplant pulm  - DuoNeb PRN  - PTA Lasix 20mg PO  - TTE per pulmonology recs  - SLP consult to r/o aspiration    Sepsis c/b suspected septic shock   Initially presented on 10/24 septic with fever, tachycardia, leukopenia, and elevated lactate. UA showing pyuria and bacteruria, urine cx not obtained. CT C/A/P showing R upper and middle lobe ill defined nodules concerning for infectious/inflammatory etiology. No abdominal focus of infection. Procalcitonin uptrending from 16 to 37. Negative Bcx and Urine cx. Negative strep pneumo, legionella, COVID, Flu, RSV, MRSA. Developed shock on AM of 10/25 with further course as below. 10/27 urine culture from OSH growing >100K/CFU e coli.   - Histo, B-D glucan, Aspergillus are pending  - DSA and IgG pending  - continue ceftriaxone  - If hemodynamic changes and new fevers would broaden Zosyn   - Transplant ID consulted.      Septic shock vs Adrenal crisis (resolved)  Became significantly hypotensive AM of 10/25. Thought to be 2/2 septic shock vs acute adrenal insufficiency. Received 4.7L of IVF. Did not require pressors. Was given stress dose steroids with 100mg Hydrocortisone followed by 50mg q6h. Lactate peaked at 7.2 and has been downtrending. On arrival to this hospital BP was stable at 137/82, Lactate 4.2, and he appeared clinically well. Lactate, Procalcitonin, and CRP downtrending.   - Completed stress dose steroids  - If hypotensive again can restart Hydrocortisone 50mg and consider gentle IVF  - stop fludracortisone: seems he was on this mostly for hyperkalemia rather than adrenal insufficiency     Pancytopenia  h/o normocytic anemia  New pancytopenia with this presentation on  top of existing anemia. Baseline Hgb 9-10. Improving since onset. On admission WBC 3.9, Hgb 9.2, and Plt 147. Differential includes marrow suppression from acute infection, DIC, medication effect, and malignancy. Fibrinogen normal. D-dimer mildly elevated at 1.19.   - CTM     h/o Bilateral BKA 2/2 severe PAD and limb ischemia  S/p bilateral BKA (left 9/25 and right 8/28). Noted some wound dehiscence and granulation tissue on L stump over last few weeks but not a significant amount of drainage or purulence.   - WOC consult     History of HIIT  History of DVT/PE  - PTA Apixaban 5mg BID     History of CMV Viremia  - Acyclovir for shingles prophylaxis s/p rituximab per chart (started on 4/23, should have completed on 10/23)--stopped       Reported H/o CKD3b  Recent Cr and eGFR have been in the normal range. Baseline Cr 0.9-1.0.  - CTM      Steroid induced DM  A1c of 4.2% on  Home dose of Lantus 18u in morning and Novolog 5u with breakfast and 3u with lunch/dinner.   - lantus changed to 10 unit(s) in AM (pta was 18 units), increase if needed  - Continue Novolog 5U with breakfast and 3U with lunch/dinner  - MDSSI  - BS check before meals and in morning  - Hypoglycemia protocol      Hypocalcemia  Hypomagnesemia  Hypermagnesemia  Hypokalemia  Vit D normal at 23. PTH mildly elevated at 99.  - CTM and correct hypomagnesemia  - CTM calcium, may provide oral replacement once magnesium is corrected  - replete to K>3.5     GERD  - PTA Pantoprazole 40mg     HTN  - Holding Coreg 6.25 BID, resumeon 10/29 if remains HDS on 10/28     CAD  Follows with cardiology, last seen 8/2024. CAD s/p PCI with MEGHNA x 1 to LAD (1/11/24) and PCI with MEGHNA x 1 to RCA (2/16/24). Managed PTA with DAPT (ASA 81 mg and clopidogrel 75 mg). He is to be on DAPT x 1 year followed by lifelong ASA 81 mg monotherapy.   - Continue ASA  - Continue Rosuvastatin  - Holding Clopidogrel at this time given thrombocytopenia     History of diffuse large b-cell lymphoma of  esophagus  - diagnosed after GI bleed in 3/24.  Started on weekly rituximab x 4 doses in 4/2024.  Restaging PET after this showed good response with decreased hypermetabolism in distal duodenum.    - held off on further rituximab given plan for vascular surgery for non-healing foot ulcers.    - follows with Dr. Drake ([facility] oncology) and perhaps with a local oncologist as well.  Plan was for repeat PET scan in 3 months, which would be this month but I don't see this scheduled.  Recommend follow-up with oncology on discharge.          Diet: Moderate Consistent Carb (60 g CHO per Meal) Diet    DVT Prophylaxis: DOAC  Naranjo Catheter: Not present  Fluids: None  Lines: 1 PIV     Cardiac Monitoring: None  Code Status: Full Code      Clinically Significant Risk Factors        # Hypokalemia: Lowest K = 3.2 mmol/L in last 2 days, will replace as needed      # Hypomagnesemia: Lowest Mg = 1.4 mg/dL in last 2 days, will replace as needed   # Hypoalbuminemia: Lowest albumin = 2.2 g/dL at 10/26/2024  5:33 AM, will monitor as appropriate    # Coagulation Defect: INR = 1.59 (Ref range: 0.85 - 1.15) and/or PTT = 41 Seconds (Ref range: 22 - 38 Seconds), will monitor for bleeding    # Hypertension: Noted on problem list    # Acute Hypoxic Respiratory Failure: Documented O2 saturation < 90%. Continue supplemental oxygen as needed             # Financial/Environmental Concerns:           Disposition Plan     Expected Discharge Date: 11/01/2024        Discharge Comments: treating for sepsis, unclear time frame, likely needs TCU as he came from TCU        The patient's care was discussed with the Attending Physician, Dr. Iglesias .    Jovanny Rubio MD  Medicine Service, [facility] TEAM 1  [facility]  Securely message with [employer] (more info)  Text page via [employer] Paging/Directory   See signed in provider for up to date coverage  information  ______________________________________________________________________    Interval History   Feeling about the same today. Still no fevers or chills. Does endorse some dysuria recently. Continues to have loose stools but this isn't new or a change compared to recent days.    Physical Exam   Vital Signs: Temp: 97.9  F (36.6  C) Temp src: Oral BP: (!) 154/91 Pulse: 99   Resp: 18 SpO2: 95 % O2 Device: Oxymask Oxygen Delivery: 2 LPM  Weight: 143 lbs 15.37 oz    General: Alert. Sitting in bed comfortably. NAD. Well developed and nourished.  HEENT: Clear conjunctivae, normal oropharynx  Cardiovascular: RRR, No murmurs, rubs, or gallops. Normal S1 and S2.  Pulmonary: slight crackles throughout (improved)  Abdominal: non-distended  Extremities: Bilateral BKA. R stump well healed with no skin breakdown. L stump wrapped in several layers of gauze, small amount of clear drainage soaking the gauze  Skin: Scattered bruising to BUE.   Neuro: awake and answering all questions appropriately, no focal deficits      Medical Decision Making           Data     I have personally reviewed the following data over the past 24 hrs:    INR:  N/A PTT:  N/A   D-dimer:  N/A Fibrinogen:  N/A     Ferritin:  N/A % Retic:  N/A LDH:  N/A       Imaging results reviewed over the past 24 hrs:   Recent Results (from the past 24 hours)   XR Chest Port 1 View    Narrative    EXAMINATION: XR CHEST PORT 1 VIEW, 10/27/2024 8:49 AM    COMPARISON: 1/9/2024    HISTORY: worsening O2 requirement    FINDINGS: Changes of bilateral lung transplant. Cardiac silhouette is  enlarged. Interstitial opacities bilaterally have increased.      Impression    IMPRESSION: Increased size of the cardiac silhouette with increased  pulmonary edema.    JULISSA OLIVAS MD         SYSTEM ID:  [id number]

## 2024-10-28 NOTE — PLAN OF CARE
Goal Outcome Evaluation:      Plan of Care Reviewed With: patient, spouse    Overall Patient Progress: no changeOverall Patient Progress: no change    Outcome Evaluation: SW will follow for updates and TCU discharge.

## 2024-10-28 NOTE — PROGRESS NOTES
Transplant & Immunocompromised   Infectious Disease     Inpatient Progress Note   Patient: Aubrey Duncan, Date of birth 1955, Medical record number [id number].     Assessment and Recommendations     Impression: Likely resolving urosepsis, his syndrome is most suggestive of a severe gram negative infection (seen in urine)    Recommendations  Continue Ceftriaxone 1g daily, anticipate switch to oral ciprofloxacin 500 mg BID at discharge to complete 14 days of therapy.  I don't think minocycline is needed since the stenotrophomonas was seen on the wound back in august and the wound is not infected at present  Azithromycin per pulmonary for CLAD  Can continue home acyclovir, dapsone for viral and PJP prophylaxis   Ordered fungal antigen testing given new small RUL nodules. However I do not think this explains his presentation and may be non specific.    ID will continue to follow, please contact (page if time sensitive, vocera message if non urgent) with questions or if any situation arises where we may be of addional assistance.    Signed:  Alonso Gloria MD, 10/28/2024   Staff Physician, Transplant and General Infectious Diseases  Pager [phone] if urgent or Vocera if non time sensitive  For coverage and paging info see [employer]-> Infectious Disease Medicine Adult/[facility]        Patient Summary:   Transplants:  9/8/2013 (Lung); POD  4068.  Coordinator Devante Hilliard  70yo M with h/o b/l lung transplant 2/2 alpha1-antitrypsin deficiency (2013, on tacrolimus, prednisone) c/b chronic lung allograft dysfunction/bronchiolitis obliterans, diffuse B-cell lymphoma of esophagus (rituximab last received 1 year ago, since deferred due to non-healing foot ulcers at the time), b/l BKA (left on 9/25/24, right on 8/28) 2/2 PAD, HLD, HTN. 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         ID  Problem List and Discussion:     # Sepsis  # Probable Urosepsis  # Possible Prostatitis  Presenting with lethargy x 2 days, emesis, abdominal pain, and significant encephalopathy.  His syndrome is most suggestive of a severe gram negative infection (seen in urine)  Urine cultures with possible pathogens, CT scan with possible prostatic inflammation    He did have dysuria prior to arrival but did not have prostatitis symptoms.    Responding to treatment of the organisms.    Will plan 10-14 day treatment, cipro likely a good oral agent for discharge,    # New lung infiltrate/Nodule  CT scan 10/25/23 c/w 8/30/24- new RUL 4mm, 5mm , 6mm nodule  Will check fungal antigen testing  Can check sputum cultures    If minimally symptomatic (presentation can be explained by UTI) then perhaps serial imaging will suffice if workup is negative. Biopsy likely needed if enlarging    # Concern for BKA infection   Does not look at all infected on arrival to [facility]. Would not treat the steno seen in the wound previously. Would not check any cultures if the wound. They will be positive and uninterpretable.    Prior ID issues  CMV donor-derived infection and viremia in 2013  Surgical wound dehiscence of RLE after bypass surgery on 8/1/24 with wound swab growing pan-susceptible S maltophilia and ASE/VSE faecalis. Was given zosyn then bactrim for 10 days. Then underwent R BKA on 8/28/24.  Aspiration pneumonia 8/2024.   LLE BKA on 9/25/24 with delayed wound healing.   PE in 9/2024    Transplant Checklist  - Bacterial prophylaxis: None, see elsewhere for treatment Abx  - Pneumocystis prophylaxis: Dapsone  - Viral serostatus & prophylaxis: CSerostatus: EBV+, CMV neg (donor positive), HSV1+, VZV+, Hep A immune, Hep B immune   - Fungal prophylaxis: none  - Additional endemic disease testing/Serology: No   - Immunization status: to be addressed once current illness resolved & in the outpatient setting   - Gamma globulin status:  Recent Labs   Lab  Test 10/27/24  0805   *     - Last Qtc 400 10/25/24   - Isolation status: Good hand hygiene.      Selected Labs, Micro, Imaging Results:       Interval/Subjective     Feels much better aside from hospital fatigue. Did have frequency and urgency PTA along with burning. Did not have pelivc, back or prostatic pain.    Review of Symptoms.  A comprehensive 14 system review of symptoms was conducted and was otherwise negative (unless mentioned above)        Physical Exam:     Temp: 97.1  F (36.2  C) Temp src: Axillary BP: (!) 154/90 Pulse: 85   Resp: 20 SpO2: 91 % O2 Device: Nasal cannula Oxygen Delivery: 2 LPM     GENERAL APPEARANCE: Not in acute distress    PHYSICAL EXAM:  Eyes:     Extraocular eye movements grossly intact, no ptosis, no discharge, no scleral icterus.  Mouth, Throat:     Mucous membranes moist  Cardiovascular:    S1, S2 normal, regular rate and rhythm.  Respiratory:     Inspection: Not in respiratory distress, chest expansion symmetrical.   Auscultation: 4 point auscultation done clear to auscultation bilaterally  Gastrointestinal:  Soft, non-tender; bowel sounds normal; no palpable masses.  Musculoskeletal:     B/l BKA, I inspected both wounds 10/28/24 , LLE with some dehisecne that does not look actively infected  Neurologic:     Higher Mental Function: Conversant, AOx4   Motor: Moving all 4 limbs  Psychiatric: Appropriate  Skin:  Anicteric    MDM   I reviewed recent lab data, notes from referring physicians, as well as other available imaging and diagnostic testing and incorporated these into my discussion as above.  The specific values when most notable are documented above but are otherwise available in the medical record for review.      Medical Decision Making  I saw and evaluated this patient on todays date as part of an E&M Encounter     1- Number and Complexity of Problems Addressed as part the Encounter High    I addressed 1 or more acute or chronic illness or injury that poses a  threat to life or bodily function Urosepsis with encephalopathy    2- Amount and/or Complexity of Data to Be Reviewed and Analyzed High   Obtained and reviewed outside hospital culture data  I reviewed the medical records for notes from other providers, and recent lab and imaging results   3- Risk of Complications and/or Morbidity or Mortality of Patient Management:  was moderate due to prescription drug management

## 2024-10-28 NOTE — CONSULTS
[facility]  WO Nurse Inpatient Assessment   Consulted for: L BKA Stump    Summary: Recently seen by [facility] nurse at [facility] or [place] on 10/25.   Pt is scheduled with out-patient follow up with vascular 11/4.    Patient History (according to provider note(s):      Aubrey Duncan is a 69 year old male. He has CAD, DLBCL of esophagus (s/p rituximab), HLD, DM2, CKD 3b, Bilateral lung transplant 2/2 COPD and Alpha-1-antitrypsin deficiency in 2013, GERD, Anemia, and recent BKA who presents for sepsis. Pt initially presented to [facility] site in [place] for lethargy on 10/24/24 transferred to our facility on 10/26 for further workup of shock. By the time of presentation had received 5L of IVF and stress dose steroids. No longer in shock and appears clinically well.      Bilateral below knee amputation (BKA)  H/o bilateral chronic limb-threatening ischemia   Peripheral arterial disease    S/p bilateral BKA (left 9/25 and right 8/28). H/o significant PAD and noted to have DVT in setting of HIT and recurrence of DVT and in setting of PAD. PTA apixiban 2.5 mg BID.  - Continue apixiban BID  - WO for left stump wounds     Assessment:      Areas visualized during today's visit: Left BKA    Wound location: L BKA    10/25/24     10/25/24    10/28      10/28  Wound due to: Surgical Wound  Wound history/plan of care: surgery 9/25/24  Wound base: pale pink smooth non granulated tissue (60%) and 40% slough and eschar.     Palpation of the wound bed: normal      Drainage: small     Description of drainage: serous and serosanguinous     Measurements (length x width x depth, in cm): 2 x 7.5 x 0.2 cm, 0.8 x 2.5 x 0.2 cm, and 0.6 x 1.3 x 0.2 cm      Tunneling: N/A     Undermining: N/A  Periwound skin: Intact      Color: normal and consistent with surrounding tissue      Temperature: normal   Odor: none  Pain: during dressing change, sharp and tender  Pain interventions prior to  dressing change: patient tolerated well, slow and gentle cares , and distraction  Treatment goal: Infection control/prevention, Increase granulation, Remove necrotic tissue, and Soften necrotic tissue  STATUS: initial assessment  Supplies ordered: at bedside, supplies stored on unit, and discussed with patient       Treatment Plan:     L BKA wound(s): Daily  and PRN  Clean with Vashe (C#[id number]) soak (Vashe on gauze 5-10 minutes). Then wipe wound/surrounding skin with Vashe/gauze and pat dry.  Apply no sting barrier film to surrounding skin.  Cover open wound with double layer of Xeroform (C#128036) cut to fit.  Cover with gauze or ABD pad and secure with rolled gauze, tape.  Elevate BL BKA on pillows so that distal portion is floating.     Orders: Written    RECOMMEND PRIMARY TEAM ORDER: None, at this time  Education provided: plan of care and Infection prevention   Discussed plan of care with: Patient and Nurse  WOC nurse follow-up plan: weekly  Notify WOC if wound(s) deteriorate.  Nursing to notify the Provider(s) and re-consult the WOC Nurse if new skin concern.    DATA:     Current support surface: Standard  Low air loss (RICHY pump, Isolibrium, Pulsate)  Containment of urine/stool: Incontinence Protocol and Incontinent pad in bed  BMI: Body mass index is 22.55 kg/m .   Active diet order: Orders Placed This Encounter      Moderate Consistent Carb (60 g CHO per Meal) Diet     Output: I/O last 3 completed shifts:  In: 485 [P.O.:480; I.V.:5]  Out: 1500 [Urine:1500]     Labs:   Recent Labs   Lab 10/28/24  0913 10/27/24  0534   ALBUMIN  --  2.7*   HGB 10.6* 8.2*   INR  --  1.59*   WBC 7.1 3.8*     Pressure injury risk assessment:   Sensory Perception: (P) 4-->no impairment  Moisture: (P) 3-->occasionally moist  Activity: (P) 2-->chairfast  Mobility: (P) 3-->slightly limited  Nutrition: (P) 3-->adequate  Friction and Shear: (P) 2-->potential problem  Stevenson Score: (P) 17    Ann Vences RN, CWOCN  Pager no longer  is use, please contact through [employer] group: [facility] Nurse [place]  Dept. Office Number: [phone]

## 2024-10-29 ENCOUNTER — APPOINTMENT (OUTPATIENT)
Dept: SPEECH THERAPY | Facility: CLINIC | Age: 69
DRG: 871 | End: 2024-10-29
Payer: MEDICARE

## 2024-10-29 LAB
1,3 BETA GLUCAN SER-MCNC: <31 PG/ML
ANION GAP SERPL CALCULATED.3IONS-SCNC: 10 MMOL/L (ref 7–15)
ANION GAP SERPL CALCULATED.3IONS-SCNC: 8 MMOL/L (ref 7–15)
BACTERIA SPT CULT: NORMAL
BUN SERPL-MCNC: 28.1 MG/DL (ref 8–23)
BUN SERPL-MCNC: 28.4 MG/DL (ref 8–23)
CALCIUM SERPL-MCNC: 7.5 MG/DL (ref 8.8–10.4)
CALCIUM SERPL-MCNC: 7.7 MG/DL (ref 8.8–10.4)
CHLORIDE SERPL-SCNC: 102 MMOL/L (ref 98–107)
CHLORIDE SERPL-SCNC: 106 MMOL/L (ref 98–107)
CREAT SERPL-MCNC: 0.73 MG/DL (ref 0.67–1.17)
CREAT SERPL-MCNC: 0.74 MG/DL (ref 0.67–1.17)
CRP SERPL-MCNC: 102 MG/L
EGFRCR SERPLBLD CKD-EPI 2021: >90 ML/MIN/1.73M2
EGFRCR SERPLBLD CKD-EPI 2021: >90 ML/MIN/1.73M2
GLUCOSE BLDC GLUCOMTR-MCNC: 102 MG/DL (ref 70–99)
GLUCOSE BLDC GLUCOMTR-MCNC: 109 MG/DL (ref 70–99)
GLUCOSE BLDC GLUCOMTR-MCNC: 143 MG/DL (ref 70–99)
GLUCOSE BLDC GLUCOMTR-MCNC: 65 MG/DL (ref 70–99)
GLUCOSE BLDC GLUCOMTR-MCNC: 67 MG/DL (ref 70–99)
GLUCOSE BLDC GLUCOMTR-MCNC: 72 MG/DL (ref 70–99)
GLUCOSE BLDC GLUCOMTR-MCNC: 76 MG/DL (ref 70–99)
GLUCOSE BLDC GLUCOMTR-MCNC: 78 MG/DL (ref 70–99)
GLUCOSE BLDC GLUCOMTR-MCNC: 88 MG/DL (ref 70–99)
GLUCOSE BLDC GLUCOMTR-MCNC: 91 MG/DL (ref 70–99)
GLUCOSE SERPL-MCNC: 100 MG/DL (ref 70–99)
GLUCOSE SERPL-MCNC: 82 MG/DL (ref 70–99)
GRAM STAIN RESULT: NORMAL
HCO3 SERPL-SCNC: 27 MMOL/L (ref 22–29)
HCO3 SERPL-SCNC: 27 MMOL/L (ref 22–29)
MAGNESIUM SERPL-MCNC: 1.7 MG/DL (ref 1.7–2.3)
NT-PROBNP SERPL-MCNC: ABNORMAL PG/ML (ref 0–900)
OBSERVATION IMP: NEGATIVE
POTASSIUM SERPL-SCNC: 3.5 MMOL/L (ref 3.4–5.3)
POTASSIUM SERPL-SCNC: 3.7 MMOL/L (ref 3.4–5.3)
PROCALCITONIN SERPL IA-MCNC: 3.08 NG/ML
SODIUM SERPL-SCNC: 139 MMOL/L (ref 135–145)
SODIUM SERPL-SCNC: 141 MMOL/L (ref 135–145)
TACROLIMUS BLD-MCNC: 6.4 UG/L (ref 5–15)
TME LAST DOSE: NORMAL H
TME LAST DOSE: NORMAL H

## 2024-10-29 PROCEDURE — 84145 PROCALCITONIN (PCT): CPT | Performed by: PHYSICIAN ASSISTANT

## 2024-10-29 PROCEDURE — 250N000009 HC RX 250: Performed by: STUDENT IN AN ORGANIZED HEALTH CARE EDUCATION/TRAINING PROGRAM

## 2024-10-29 PROCEDURE — 250N000013 HC RX MED GY IP 250 OP 250 PS 637: Performed by: PHYSICIAN ASSISTANT

## 2024-10-29 PROCEDURE — 94640 AIRWAY INHALATION TREATMENT: CPT | Mod: 76

## 2024-10-29 PROCEDURE — 92610 EVALUATE SWALLOWING FUNCTION: CPT | Mod: GN

## 2024-10-29 PROCEDURE — 99232 SBSQ HOSP IP/OBS MODERATE 35: CPT | Mod: 24 | Performed by: STUDENT IN AN ORGANIZED HEALTH CARE EDUCATION/TRAINING PROGRAM

## 2024-10-29 PROCEDURE — 80048 BASIC METABOLIC PNL TOTAL CA: CPT

## 2024-10-29 PROCEDURE — 82310 ASSAY OF CALCIUM: CPT

## 2024-10-29 PROCEDURE — 99233 SBSQ HOSP IP/OBS HIGH 50: CPT | Mod: 24 | Performed by: PHYSICIAN ASSISTANT

## 2024-10-29 PROCEDURE — 86140 C-REACTIVE PROTEIN: CPT | Performed by: STUDENT IN AN ORGANIZED HEALTH CARE EDUCATION/TRAINING PROGRAM

## 2024-10-29 PROCEDURE — 83880 ASSAY OF NATRIURETIC PEPTIDE: CPT

## 2024-10-29 PROCEDURE — 83735 ASSAY OF MAGNESIUM: CPT

## 2024-10-29 PROCEDURE — 36415 COLL VENOUS BLD VENIPUNCTURE: CPT | Performed by: PHYSICIAN ASSISTANT

## 2024-10-29 PROCEDURE — 82374 ASSAY BLOOD CARBON DIOXIDE: CPT

## 2024-10-29 PROCEDURE — 36415 COLL VENOUS BLD VENIPUNCTURE: CPT | Performed by: STUDENT IN AN ORGANIZED HEALTH CARE EDUCATION/TRAINING PROGRAM

## 2024-10-29 PROCEDURE — 87070 CULTURE OTHR SPECIMN AEROBIC: CPT | Performed by: PHYSICIAN ASSISTANT

## 2024-10-29 PROCEDURE — 120N000003 HC R&B IMCU UMMC

## 2024-10-29 PROCEDURE — 92526 ORAL FUNCTION THERAPY: CPT | Mod: GN

## 2024-10-29 PROCEDURE — 250N000013 HC RX MED GY IP 250 OP 250 PS 637

## 2024-10-29 PROCEDURE — 250N000012 HC RX MED GY IP 250 OP 636 PS 637

## 2024-10-29 PROCEDURE — 999N000157 HC STATISTIC RCP TIME EA 10 MIN

## 2024-10-29 PROCEDURE — 250N000011 HC RX IP 250 OP 636

## 2024-10-29 PROCEDURE — 250N000012 HC RX MED GY IP 250 OP 636 PS 637: Performed by: PHYSICIAN ASSISTANT

## 2024-10-29 PROCEDURE — 80197 ASSAY OF TACROLIMUS: CPT | Performed by: PHYSICIAN ASSISTANT

## 2024-10-29 PROCEDURE — 94640 AIRWAY INHALATION TREATMENT: CPT

## 2024-10-29 PROCEDURE — 99233 SBSQ HOSP IP/OBS HIGH 50: CPT | Mod: GC | Performed by: STUDENT IN AN ORGANIZED HEALTH CARE EDUCATION/TRAINING PROGRAM

## 2024-10-29 PROCEDURE — 36415 COLL VENOUS BLD VENIPUNCTURE: CPT

## 2024-10-29 RX ORDER — CIPROFLOXACIN 500 MG/1
500 TABLET, FILM COATED ORAL EVERY 12 HOURS SCHEDULED
Status: DISCONTINUED | OUTPATIENT
Start: 2024-10-29 | End: 2024-11-05 | Stop reason: HOSPADM

## 2024-10-29 RX ORDER — MAGNESIUM SULFATE HEPTAHYDRATE 40 MG/ML
4 INJECTION, SOLUTION INTRAVENOUS ONCE
Status: COMPLETED | OUTPATIENT
Start: 2024-10-29 | End: 2024-10-29

## 2024-10-29 RX ORDER — FUROSEMIDE 10 MG/ML
20 INJECTION INTRAMUSCULAR; INTRAVENOUS ONCE
Status: COMPLETED | OUTPATIENT
Start: 2024-10-29 | End: 2024-10-29

## 2024-10-29 RX ORDER — POTASSIUM CHLORIDE 750 MG/1
40 TABLET, EXTENDED RELEASE ORAL ONCE
Status: COMPLETED | OUTPATIENT
Start: 2024-10-29 | End: 2024-10-29

## 2024-10-29 RX ORDER — MULTIPLE VITAMINS W/ MINERALS TAB 9MG-400MCG
1 TAB ORAL
Status: DISCONTINUED | OUTPATIENT
Start: 2024-10-30 | End: 2024-11-05 | Stop reason: HOSPADM

## 2024-10-29 RX ADMIN — POTASSIUM CHLORIDE 40 MEQ: 750 TABLET, EXTENDED RELEASE ORAL at 23:14

## 2024-10-29 RX ADMIN — LOPERAMIDE HYDROCHLORIDE 2 MG: 2 CAPSULE ORAL at 09:00

## 2024-10-29 RX ADMIN — MAGNESIUM SULFATE IN WATER 4 G: 40 INJECTION, SOLUTION INTRAVENOUS at 11:51

## 2024-10-29 RX ADMIN — PANTOPRAZOLE SODIUM 40 MG: 40 TABLET, DELAYED RELEASE ORAL at 09:01

## 2024-10-29 RX ADMIN — IPRATROPIUM BROMIDE AND ALBUTEROL SULFATE 3 ML: .5; 3 SOLUTION RESPIRATORY (INHALATION) at 20:01

## 2024-10-29 RX ADMIN — PREDNISONE 5 MG: 5 TABLET ORAL at 09:00

## 2024-10-29 RX ADMIN — IPRATROPIUM BROMIDE AND ALBUTEROL SULFATE 3 ML: .5; 3 SOLUTION RESPIRATORY (INHALATION) at 07:34

## 2024-10-29 RX ADMIN — APIXABAN 5 MG: 5 TABLET, FILM COATED ORAL at 09:02

## 2024-10-29 RX ADMIN — Medication 60 ML: at 11:50

## 2024-10-29 RX ADMIN — ACETAMINOPHEN 650 MG: 325 TABLET, FILM COATED ORAL at 20:04

## 2024-10-29 RX ADMIN — FUROSEMIDE 20 MG: 20 TABLET ORAL at 09:02

## 2024-10-29 RX ADMIN — MONTELUKAST 10 MG: 10 TABLET, FILM COATED ORAL at 20:04

## 2024-10-29 RX ADMIN — TACROLIMUS 1 MG: 1 CAPSULE ORAL at 09:01

## 2024-10-29 RX ADMIN — TACROLIMUS 1 MG: 1 CAPSULE ORAL at 17:44

## 2024-10-29 RX ADMIN — IPRATROPIUM BROMIDE AND ALBUTEROL SULFATE 3 ML: .5; 3 SOLUTION RESPIRATORY (INHALATION) at 12:09

## 2024-10-29 RX ADMIN — APIXABAN 5 MG: 5 TABLET, FILM COATED ORAL at 20:04

## 2024-10-29 RX ADMIN — Medication 2 CAPSULE: at 20:04

## 2024-10-29 RX ADMIN — LOPERAMIDE HYDROCHLORIDE 2 MG: 2 CAPSULE ORAL at 23:14

## 2024-10-29 RX ADMIN — CARVEDILOL 6.25 MG: 6.25 TABLET, FILM COATED ORAL at 20:06

## 2024-10-29 RX ADMIN — DAPSONE 50 MG: 25 TABLET ORAL at 09:02

## 2024-10-29 RX ADMIN — LOPERAMIDE HYDROCHLORIDE 2 MG: 2 CAPSULE ORAL at 15:53

## 2024-10-29 RX ADMIN — VENLAFAXINE HYDROCHLORIDE 37.5 MG: 37.5 CAPSULE, EXTENDED RELEASE ORAL at 09:01

## 2024-10-29 RX ADMIN — PREDNISONE 2.5 MG: 2.5 TABLET ORAL at 20:04

## 2024-10-29 RX ADMIN — FLUTICASONE FUROATE AND VILANTEROL TRIFENATATE 1 PUFF: 200; 25 POWDER RESPIRATORY (INHALATION) at 09:07

## 2024-10-29 RX ADMIN — Medication 2 CAPSULE: at 09:00

## 2024-10-29 RX ADMIN — CARVEDILOL 6.25 MG: 6.25 TABLET, FILM COATED ORAL at 12:23

## 2024-10-29 RX ADMIN — Medication 500 MG: at 22:14

## 2024-10-29 RX ADMIN — ACETAMINOPHEN 650 MG: 325 TABLET, FILM COATED ORAL at 09:10

## 2024-10-29 RX ADMIN — IPRATROPIUM BROMIDE AND ALBUTEROL SULFATE 3 ML: .5; 3 SOLUTION RESPIRATORY (INHALATION) at 16:41

## 2024-10-29 RX ADMIN — LOPERAMIDE HYDROCHLORIDE 2 MG: 2 CAPSULE ORAL at 20:04

## 2024-10-29 RX ADMIN — THIAMINE HCL TAB 100 MG 100 MG: 100 TAB at 09:00

## 2024-10-29 RX ADMIN — POTASSIUM CHLORIDE 40 MEQ: 750 TABLET, EXTENDED RELEASE ORAL at 14:10

## 2024-10-29 RX ADMIN — CIPROFLOXACIN HYDROCHLORIDE 500 MG: 250 TABLET, FILM COATED ORAL at 20:04

## 2024-10-29 RX ADMIN — FUROSEMIDE 20 MG: 10 INJECTION, SOLUTION INTRAMUSCULAR; INTRAVENOUS at 14:10

## 2024-10-29 RX ADMIN — ASPIRIN 81 MG: 81 TABLET ORAL at 09:01

## 2024-10-29 NOTE — PROGRESS NOTES
Transplant & Immunocompromised   Infectious Disease     Inpatient Progress Note   Patient: Aubrey Duncan, Date of birth 1955, Medical record number [id number].     Assessment and Recommendations     Impression: Most consistent with resolving urosepsis, his syndrome is most suggestive of a severe gram negative infection (seen in urine)    Recommendations  Ok to switch to oral ciprofloxacin 500 mg BID  to complete 14 days of therapy (10/25-11/7)  Azithromycin per pulmonary for CLAD  Can continue home acyclovir, dapsone for viral and PJP prophylaxis  If fungal testing remains negative then would consider serial imaging for the lung nodules, the patient does not have typical hallmarks of an acute pneumonia.    ID will sign off, please contact (page if time sensitive, vocera message if non urgent) with questions or if any situation arises where we may be of addional assistance.    Signed:  Alonso Gloria MD, 10/29/2024   Staff Physician, Transplant and General Infectious Diseases  Pager [phone] if urgent or Vocera if non time sensitive  For coverage and paging info see [facility]-> Infectious Disease Medicine Adult/[facility] [place]        Patient Summary:   Transplants:  9/8/2013 (Lung); POD  4069.  Coordinator Devante Hilliard  70yo M with h/o b/l lung transplant 2/2 alpha1-antitrypsin deficiency (2013, on tacrolimus, prednisone) c/b chronic lung allograft dysfunction/bronchiolitis obliterans, diffuse B-cell lymphoma of esophagus (rituximab last received 1 year ago, since deferred due to non-healing foot ulcers at the time), b/l BKA (left on 9/25/24, right on 8/28) 2/2 PAD, HLD, HTN. 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         ID Problem List and Discussion:     # Sepsis  # Probable Urosepsis  # Prostatic enhancement seen on CT  Presenting with lethargy x 2 days, emesis, abdominal  pain, and significant encephalopathy.  His syndrome is most suggestive of a severe gram negative infection (seen in urine)  Urine cultures with possible pathogens, CT scan with possible prostatic inflammation    He did have dysuria prior to arrival but did not have prostatitis symptoms.    Responding to treatment of the organisms.    Will plan 10-14 day treatment, cipro likely a good oral agent for discharge    If he has recurrent urinary tract infections in the future he will benefit from a more comprehensive urologic evaluation.    # New lung infiltrate/Nodule  CT scan 10/25/23 c/w 8/30/24- new RUL 4mm, 5mm , 6mm nodule   Histo Ag -ve 10/26   Aspergillus galactomannan and beta D glucan are both -VE 10/25/2024   Fungal antibodies pending       perhaps serial imaging will suffice if workup remains negative. Biopsy likely needed if enlarging    # Concern for BKA infection   Does not look at all infected on arrival to [facility]. Would not treat the steno seen in the wound previously. Would not check any cultures if the wound. They will be positive and uninterpretable.    Prior ID issues  CMV donor-derived infection and viremia in 2013  Surgical wound dehiscence of RLE after bypass surgery on 8/1/24 with wound swab growing pan-susceptible S maltophilia and ASE/VSE faecalis. Was given zosyn then bactrim for 10 days. Then underwent R BKA on 8/28/24.  Aspiration pneumonia 8/2024.   LLE BKA on 9/25/24 with delayed wound healing.   PE in 9/2024    Transplant Checklist  - Bacterial prophylaxis: None, see elsewhere for treatment Abx  - Pneumocystis prophylaxis: Dapsone  - Viral serostatus & prophylaxis: CSerostatus: EBV+, CMV neg (donor positive), HSV1+, VZV+, Hep A immune, Hep B immune   - Fungal prophylaxis: none  - Additional endemic disease testing/Serology: No   - Immunization status: to be addressed once current illness resolved & in the outpatient setting   - Gamma globulin status:  Recent Labs   Lab Test 10/27/24  0805    *     - Last Qtc 400 10/25/24   - Isolation status: Good hand hygiene.      Selected Labs, Micro, Imaging Results:       Interval/Subjective     Feels much better aside from hospital fatigue. Did have frequency and urgency PTA along with burning. Did not have pelivc, back or prostatic pain.  He thinks his breathing is good today.  He was satting at 95% on room air while I was in the room but per his team he drops to 85%.  He has no respiratory symptoms.  He feels very close to his baseline.  Unfortunately his mother-in-law had to be brought to the hospital today so he has minimal help at home.    Review of Symptoms.  A comprehensive 14 system review of symptoms was conducted and was otherwise negative (unless mentioned above)        Physical Exam:     Temp: 97.7  F (36.5  C) Temp src: Oral BP: 117/84 Pulse: 99   Resp: 18 SpO2: 92 % O2 Device: Nasal cannula Oxygen Delivery: 1/2 LPM     GENERAL APPEARANCE: Not in acute distress    PHYSICAL EXAM:  Eyes:     Extraocular eye movements grossly intact, no ptosis, no discharge, no scleral icterus.  Mouth, Throat:     Mucous membranes moist  Cardiovascular:    S1, S2 normal, regular rate and rhythm.  Respiratory:     Inspection: Not in respiratory distress, chest expansion symmetrical.   Auscultation: 4 point auscultation done clear to auscultation bilaterally  Gastrointestinal:  Soft, non-tender; bowel sounds normal; no palpable masses.  Musculoskeletal:     B/l BKA, I inspected both wounds 10/28/24 , LLE with some dehisecne that does not look actively infected  Neurologic:     Higher Mental Function: Conversant, AOx4   Motor: Moving all 4 limbs  Psychiatric: Appropriate  Skin:  Anicteric    MDM   I reviewed recent lab data, notes from referring physicians, as well as other available imaging and diagnostic testing and incorporated these into my discussion as above.  The specific values when most notable are documented above but are otherwise available in the  medical record for review.      Medical Decision Making  I saw and evaluated this patient on todays date as part of an E&M Encounter     1- Number and Complexity of Problems Addressed as part the Encounter High    I addressed 1 or more acute or chronic illness or injury that poses a threat to life or bodily function Urosepsis with encephalopathy    2- Amount and/or Complexity of Data to Be Reviewed and Analyzed moderate  I reviewed the medical records for notes from other providers, and recent lab and imaging results   3- Risk of Complications and/or Morbidity or Mortality of Patient Management:  was moderate due to prescription drug management

## 2024-10-29 NOTE — PLAN OF CARE
Neuro: A&Ox4.   Cardiac: BP elevated this am, home coreg restarted, BP improved. Pt tachy at 100.    Respiratory: Attempted to wean pt off oxygen, able to be on RA for 20-30 minutes before needing 1L NC. IS at bedside, encouraged use.   GI/: Adequate urine output., good response after lasix. Using primofit. BM X1 using bedpan.   Diet/appetite: Tolerating regular diet. Eating well.  Activity:  Assist of Lift, up to chair x1.    Pain: Pt given tylenol x1 for left stump pain.    Skin: Dressing change done per order to left stump.   LDA's: PIV x1,     Plan: Pt had low blood sugar this am, treated with juice and came up. Also low at  1800 at 67, juice given. Pt asymptomatic. Held dinner carb coverage. Mag and potassium replaced. Continue with POC. Notify primary team with changes.     Goal Outcome Evaluation:

## 2024-10-29 NOTE — PLAN OF CARE
[id number]    Neuro: A&Ox4. [place] baseline, wear hearing aids & glasses.   Cardiac: Not on tele. SBP 150s, provider aware. Other VSS.   Respiratory: Sating >92% on 1.5L NC during day, on 2L NC at night time.   GI/: Adequate urine output via primofit. No BM this shift, total BM x3 today. PRN immodium given x1 this shift.   Diet/appetite: Consistent carb diet, BG ACHS & 0200. Eating well.  Activity:  Assist of 2 with repositioning, lift.   Pain: C/o L knee pain incision rated 5 out of 10, managed with PRN tylenol given x1.   Skin: L leg dressing reinforced this shift. R arm weeping.   LDA's: R PIV; primofit.     Plan: Continue with POC. Notify primary team with changes.

## 2024-10-29 NOTE — PROVIDER NOTIFICATION
Paged provider SYLVESTER Naylor @[phone] to FYI about the BMP results are back. Also, /91, map of 109 HR 98.     Provider response: aware, no new order obtains.

## 2024-10-29 NOTE — PLAN OF CARE
BP (!) 163/99 (BP Location: Left arm)   Pulse 89   Temp 97.8  F (36.6  C) (Oral)   Resp 19   Wt 65.3 kg (143 lb 15.4 oz)   SpO2 95%   BMI 22.55 kg/m      Shift: 2300 - 0730  Cardiac: VSS.  Neuro: Aox4.   Respiratory: Sating > 92% on 1 L NC.  Pain/Nausea/PRN: L knee pain. PRN Tylenol available.  Diet: Consistent carb.  LDA: R PIV - SL.  GI/: LBML 10/28, frequent + loose stools, PRN imodium x1. Adequate urine output via Primofit.  Skin: No new deficits noted.  Mobility: A2 lift.    Will give report to oncoming nurse. Pt left in stable condition, care relinquished at this time.

## 2024-10-29 NOTE — PROGRESS NOTES
[facility]    Progress Note - Medicine Service, MAROON TEAM 1       Date of Admission:  10/26/2024    Assessment & Plan   Aubrey Duncan is a 69 year old male. He has CAD, DLBCL of esophagus (s/p rituximab), HLD, DM2, CKD 3b, Bilateral lung transplant 2/2 COPD and Alpha-1-antitrypsin deficiency in 2013, GERD, Anemia, and recent BKA who presents for sepsis. Pt initially presented to [facility] in [place] for lethargy on 10/24/24 transferred to our facility on 10/26 for further workup of shock. By the time of arrival at [facility], shock and encephalopathy had resolved. Transplant pulmonology and transplant ID were consulted and patient is receiving treatment for UTI as well as AHRF.     Today:  - Transplant ID and Pulmonology following, appreciate recs  - discontinue Ceftriaxone and start Ciprofloxacin 500mg PO BID  - Holding Azithromycin while on Ciprofloxacin  - TTE showing EF of 35-40% which is significantly reduced from EF of 55-60% on 1/11/24  - Cardiology consulted  - SLP has completed swallow study and no concern for aspiration  - Changed from carb consistent to regular diet given some episodes of asymptomatic hypoglycemia  - continue home PO lasix 20 mg daily. Additional 20mg IV lasix. Goal NN -1L.   - Tacro in therapeutic range. Continue Tacrolimus 1mg BID    Acute hypoxic respiratory failure likely 2/2 HFrEf as below  H/o bilateral lung transplant 2/2 COPD and Alpha-1-antitrypsin deficiency  Transplanted in 2013 for A1AT deficency. 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 Has not had Tacrolimus level checked in >1 month prior to hospitalization. On check was found to be significantly elevated at 39.8. Tacro level downtrending. SOB improved with DuoNebs and Lasix.   - Transplant Pulmonology following  - Continue BID Prednisone (5 and  2.5mg), Dapsone, Montelukast  - 1mg Tacrolimus BID  - DuoNeb PRN  - PTA Lasix 20mg PO  - TTE per pulmonology recs  - SLP consult to r/o aspiration    New HFrEF (EF 35-40%)  EF of 55-60% on 1/11/24. Echo from this admission on 10/28/24 showing new EF of 35-40%, moderate diffuse hypokinesis. Difficult to assess WMA due to Afib. No significant valvular abnormalities. EKG on 10/25 without ST changes. Low concern for thyroid dysfunction, PE, ACS, as the cause of this acute presentation. Might be Tacrolimus induced cardiomyopathy vs sepsis?  - Continue PTA 20mg PO Lasix  - 20mg IV Lasix  - Goal NN 1-2L  - 2g NA and 2L fluid restriction  - Recommended that he not use the table salt that he brought from home  - Cardiology consulted    Sepsis c/b suspected septic shock   Initially presented on 10/24 septic with fever, tachycardia, leukopenia, and elevated lactate. UA showing pyuria and bacteruria, urine cx not obtained. CT C/A/P showing R upper and middle lobe ill defined nodules concerning for infectious/inflammatory etiology. No abdominal focus of infection. Procalcitonin uptrending from 16 to 37. Negative Bcx and Urine cx. Negative strep pneumo, legionella, COVID, Flu, RSV, MRSA. Developed shock on AM of 10/25 with further course as below. 10/27 urine culture from OSH growing >100K/CFU e coli.   - Histo, B-D glucan, Aspergillus are negative  - Fungal antibodies pending  - DSA and IgG pending  - continue Ciprofloxacin through 11/7 for 14 day course  - Transplant ID consulted.      Septic shock vs Adrenal crisis (resolved)  Became significantly hypotensive AM of 10/25. Thought to be 2/2 septic shock vs acute adrenal insufficiency. Received 4.7L of IVF. Did not require pressors. Was given stress dose steroids with 100mg Hydrocortisone followed by 50mg q6h. Lactate peaked at 7.2 and has been downtrending. On arrival to this hospital BP was stable at 137/82, Lactate 4.2, and he appeared clinically well. Lactate, Procalcitonin,  and CRP downtrending.   - Completed stress dose steroids  - If hypotensive again can restart Hydrocortisone 50mg and consider gentle IVF  - stop fludracortisone: seems he was on this mostly for hyperkalemia rather than adrenal insufficiency     Pancytopenia  h/o normocytic anemia  New pancytopenia with this presentation on top of existing anemia. Baseline Hgb 9-10. Improving since onset. On admission WBC 3.9, Hgb 9.2, and Plt 147. Differential includes marrow suppression from acute infection, DIC, medication effect, and malignancy. Fibrinogen normal. D-dimer mildly elevated at 1.19.   - CTM     h/o Bilateral BKA 2/2 severe PAD and limb ischemia  S/p bilateral BKA (left 9/25 and right 8/28). Noted some wound dehiscence and granulation tissue on L stump over last few weeks but not a significant amount of drainage or purulence.   - WOC consult     Difficult vascular access  Pt has had difficulties with PICC placement by vascular access team. Also has difficulties with PIVs, they flush and draw appropriately however he still develops significant edema in his arms when medications are infusing, as well as severe bruising. Discussed with IR and recommended line (if ultimately needed) would be single lumen cuffed low flow catheter tunneled line into the internal jugular vein. But would not be recommended in the setting of CKD.     History of HIIT  History of DVT/PE  - PTA Apixaban 5mg BID     History of CMV Viremia  Last level (10/25) negative. Per OP notes, pt. had been on chronic valcyte, but this was stopped secondary to cost.   - CMV monitoring weekly for 4 weeks given stress dose steroids (next 11/1)    Reported H/o CKD3b  Recent Cr and eGFR have been in the normal range. Baseline Cr 0.9-1.0.  - CTM      Steroid induced DM  A1c of 4.2% on 8/25/24. Home dose of Lantus 18u in morning and Novolog 5u with breakfast and 3u with lunch/dinner. Given his very low A1c and episodes of asymptomatic hypoglycemia in the hospital  on his   - lantus changed to 10 unit(s) in AM (pta was 18 units), increase if needed  - Continue Novolog 5U with breakfast and 3U with lunch/dinner  - MDSSI  - BS check before meals and in morning  - Hypoglycemia protocol      Hypocalcemia  Hypomagnesemia  Hypermagnesemia  Hypokalemia  Vit D normal at 23. PTH mildly elevated at 99.  - CTM and correct hypomagnesemia  - CTM calcium, may provide oral replacement once magnesium is corrected  - replete to K>3.5     GERD  - PTA Pantoprazole 40mg     HTN  - Holding Coreg 6.25 BID, resumeon 10/29 if remains HDS on 10/28     CAD  Follows with cardiology, last seen 8/2024. CAD s/p PCI with MEGHNA x 1 to LAD (1/11/24) and PCI with MEGHNA x 1 to RCA (2/16/24). Managed PTA with DAPT (ASA 81 mg and clopidogrel 75 mg). He is to be on DAPT x 1 year followed by lifelong ASA 81 mg monotherapy.   - Continue ASA  - Continue Rosuvastatin  - Cardiology consulted     History of diffuse large b-cell lymphoma of esophagus  - diagnosed after GI bleed in 3/24.  Started on weekly rituximab x 4 doses in 4/2024.  Restaging PET after this showed good response with decreased hypermetabolism in distal duodenum.    - held off on further rituximab given plan for vascular surgery for non-healing foot ulcers.    - follows with Dr. Drake ([facility] oncology) and perhaps with a local oncologist as well.  Plan was for repeat PET scan in 3 months, which would be this month but I don't see this scheduled.  Recommend follow-up with oncology on discharge.          Diet: Moderate Consistent Carb (60 g CHO per Meal) Diet    DVT Prophylaxis: DOAC  Naranjo Catheter: Not present  Fluids: None  Lines: 1 PIV     Cardiac Monitoring: None  Code Status: Full Code      Clinically Significant Risk Factors        # Hypokalemia: Lowest K = 3 mmol/L in last 2 days, will replace as needed  # Hypernatremia: Highest Na = 146 mmol/L in last 2 days, will monitor as appropriate  # Hyperchloremia: Highest Cl = 110 mmol/L in last 2 days, will  monitor as appropriate          # Hypoalbuminemia: Lowest albumin = 2.2 g/dL at 10/26/2024  5:33 AM, will monitor as appropriate    # Coagulation Defect: INR = 1.59 (Ref range: 0.85 - 1.15) and/or PTT = 41 Seconds (Ref range: 22 - 38 Seconds), will monitor for bleeding    # Hypertension: Noted on problem list  # Chronic heart failure with reduced ejection fraction: last echo with EF <40%              # Financial/Environmental Concerns: none         Disposition Plan     Expected Discharge Date: 11/01/2024      Destination: inpatient rehabilitation facility  Discharge Comments: treating for sepsis, unclear time frame, likely needs TCU as he came from TCU        The patient's care was discussed with the Attending Physician, Dr. Iglesias .    Jonny Sutherland MD  Medicine Service, [facility] TEAM [address] [place] [address]  Securely message with [employer] (more info)  Text page via [facility] Paging/Directory   See signed in provider for up to date coverage information  ______________________________________________________________________    Interval History   NAEO. Turned his O2 off while seeing him this morning and he maintained O2 sat >90% during our conversation.     Reports that his breathing feels slightly improved compared to yesterday. Has some dry coughing when he wakes up but nothing else throughout the day and no sputum production. Having some pain in L stump that is controlled with Tylenol. Eating well but was noted to have brought table salt from home that he has been using, recommended that he not use this as it is likely leading to excess fluid retention and pulmonary edema. Diarrhea is unchanged and at his baseline, using Imodium daily. Denies chest pain, dizziness, abd pain, n/v, and dysuria.    Physical Exam   Vital Signs: Temp: 97.4  F (36.3  C) Temp src: Oral BP: (!) 169/99 Pulse: 103   Resp: 18 SpO2: 93 % O2 Device: Nasal cannula Oxygen Delivery: 2 LPM  Weight: 142 lbs 6.67  oz    General: Alert. Sitting in bed comfortably. NAD. Well developed and nourished.  HEENT: Clear conjunctivae, normal oropharynx  Cardiovascular: RRR, No murmurs, rubs, or gallops. Normal S1 and S2.  Pulmonary: Crackles in bilateral bases. Normal inspiratory effort.   Abdominal: non-distended and non-tender  Extremities: Bilateral BKA. R stump well healed with no skin breakdown. L stump with pictures as in media tab.   Skin: Scattered bruising to BUE.   Neuro: awake and answering all questions appropriately, no focal deficits      Medical Decision Making           Data     I have personally reviewed the following data over the past 24 hrs:    7.1  \   10.6 (L)   / 182     141 106 28.4 (H) /  76   3.5 27 0.74 \     Procal: N/A CRP: 102.00 (H) Lactic Acid: N/A         Imaging results reviewed over the past 24 hrs:   Recent Results (from the past 24 hours)   XR Chest Port 1 View    Narrative    Exam: XR CHEST PORT 1 VIEW, 10/28/2024 10:42 AM    Comparison: Chest radiograph, 10/27/2024    History: assess interval change in pulmonary edema    Findings:  Frontal semiupright radiograph of the chest was obtained. Postsurgical  changes of bilateral lung transplantation with unchanged appearance of  clamshell sternotomy wires. Stable cardiac silhouette. Decreased  vascular congestion and Kerley B lines compared with prior  examination. Left-sided costophrenic blunting. The right costophrenic  angle is clear. No pneumothorax. Improved basilar predominant hazy  airspace opacities.      Impression    Impression:   1. Decreased pulmonary edema, with mild residual edematous changes  and/or atelectatic changes.  2. Small left pleural effusion, increased from prior examination.    I have personally reviewed the examination and initial interpretation  and I agree with the findings.    JACOBY SEVILLA MD         SYSTEM ID:  [id number]   Echo Complete   Result Value    LVEF  35-40% (moderately reduced)    Narrative     [id number]  [id number]  [id number]  [id number]^PAUL^ZACHARIAH^ISABELLA     [facility],[place]  Echocardiography Laboratory  [address] [place] [address]     Name: YARED HAMLIN  MRN: [id number]  : 1955  Study Date: 10/28/2024 01:56 PM  Age: 69 yrs  Gender: Male  Patient Location: [facility]  Reason For Study: Dyspnea  Ordering Physician: ZACHARIAH MILLER  Referring Physician: KATE PHILIPPE  Performed By: Carmelita Walsh     BSA: 1.8 m2  Height: 67 in  Weight: 143 lb  HR: 105  BP: 159/97 mmHg  ______________________________________________________________________________  Procedure  Complete Portable Echo Adult. Contrast Optison. Optison (NDC #6659-9528-16)  given intravenously. Patient was given 6 ml mixture of 3 ml Optison and 6 ml  saline. 3 ml wasted. The patient's rhythm is atrial fibrillation.  ______________________________________________________________________________  Interpretation Summary  Left ventricular function is moderately reduced. Biplane EF is 38%.  Global right ventricular function is normal.  No significant valvular abnormalities present.  The inferior vena cava was normal in size with preserved respiratory  variability.  Compared to the prior study on 2024, the LV function is now moderately  reduced.  ______________________________________________________________________________  Left Ventricle  Left ventricular size is normal. Left ventricular wall thickness is normal.  Thickening of the anterobasal septum is present. Left ventricular function is  decreased. The ejection fraction is 35-40% (moderately reduced). Grade I or  early diastolic dysfunction. Moderate diffuse hypokinesis is present. Regional  wall motion assessment limited by irregular rhythm.     Right Ventricle  The right ventricle is normal size. Global right ventricular function is  normal.     Atria  Both atria appear normal. The atrial septum is intact as assessed by color  Doppler .      Mitral Valve  Trace mitral insufficiency is present.     Aortic Valve  The aortic valve is tricuspid. No aortic regurgitation is present. No aortic  stenosis is present.     Tricuspid Valve  Trace tricuspid insufficiency is present. The right ventricular systolic  pressure is 30mmHg above the right atrial pressure. Pulmonary artery systolic  pressure is normal.     Pulmonic Valve  The pulmonic valve is normal.     Vessels  The aortic root is mildly dilated to 3.7 cm. The ascending aorta is mildly  dilated to 3.7 cm. The inferior vena cava was normal in size with preserved  respiratory variability. The pulmonary artery cannot be assessed.     Pericardium  No pericardial effusion is present.     Miscellaneous  No significant valvular abnormalities present.     Compared to Previous Study  Compared to the prior study on 2024, the LV function is now moderately  reduced.     Attestation  I have personally viewed the imaging and agree with the interpretation and  report as documented by the fellow, Cornelius Chowdhury, and/or edited by me.  ______________________________________________________________________________  MMode/2D Measurements & Calculations  IVSd: 1.2 cm  LVIDd: 4.5 cm  LVIDs: 4.0 cm  LVPWd: 0.92 cm  FS: 11.6 %  LV mass(C)d: 162.3 grams  LV mass(C)dI: 92.6 grams/m2  Ao root diam: 3.7 cm  asc Aorta Diam: 3.7 cm  LVOT diam: 2.4 cm  LVOT area: 4.6 cm2     EF(MOD-bp): 38.4 %  Ao root diam index Ht(cm/m): 2.2  Ao root diam index BSA (cm/m2): 2.1  Asc Ao diam index BSA (cm/m2): 2.1     Asc Ao diam index Ht(cm/m): 2.2  RWT: 0.41  TAPSE: 1.5 cm     Doppler Measurements & Calculations  MV E max roberto: 39.8 cm/sec  MV A max roberto: 67.3 cm/sec  MV E/A: 0.59  PA acc time: 0.09 sec  TR max roberto: 272.7 cm/sec  TR max P.8 mmHg  E/E' av.0  Lateral E/e': 6.8  Medial E/e': 7.2  RV S Roberto: 8.9 cm/sec     ______________________________________________________________________________  Report approved by: Rach Contreras  10/28/2024 03:09 PM

## 2024-10-29 NOTE — PROGRESS NOTES
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  Recently s/p right BKA in August and left BKA on 9/25/24 and [facility].  Increased lethargy and confusion 10/24, seen in the [facility] ER with concern for pneumonia and UTI s/p IV fluids and ceftriaxone, transferred to [facility] for sepsis, started on broad spectrum ABX, IV fluids, and stress dose steroids.  The patient was transferred to [facility] on 10/26 for further work up of sepsis and encephalopathy, possible UTI as source.  Also with supratherapeutic tacrolimus level.  Clinical swallow evaluation completed per MD order.   Pain Assessment   Patient Currently in Pain No   Type of Evaluation   Type of Evaluation Swallow Evaluation   Oral Motor   Oral Musculature generally intact   Structural Abnormalities none present   Dentition (Oral Motor)   Comment, Dentition (Oral Motor) 1 missing upper tooth   Dentition (Oral Motor) natural dentition;adequate dentition   Facial Symmetry (Oral Motor)   Facial Symmetry (Oral Motor) WNL   Lip Function (Oral Motor)   Lip Range of Motion (Oral Motor) WNL   Tongue Function (Oral Motor)   Tongue Coordination/Speed (Oral Motor) WNL   Tongue ROM (Oral Motor) WNL   Cough/Swallow/Gag Reflex (Oral Motor)   Volitional Throat Clear/Cough (Oral Motor) WNL   Volitional Swallow (Oral Motor) WNL   Vocal  Quality/Secretion Management (Oral Motor)   Vocal Quality (Oral Motor) WNL   Secretion Management (Oral Motor) WNL   General Swallowing Observations   Past History of Dysphagia None per pt report or chart review   Respiratory Support nasal cannula;other (see comments)  (1 L)   Current Diet/Method of Nutritional Intake (General Swallowing Observations, NIS) thin liquids (level 0);regular diet   Swallowing Evaluation Clinical swallow evaluation   Clinical Swallow Evaluation   Feeding Assistance no assistance needed   Clinical Swallow Evaluation Textures Trialed thin liquids;pureed;solid foods   Clinical Swallow Eval: Thin Liquid Texture Trial   Mode of Presentation, Thin Liquids self-fed;straw   Volume of Liquid or Food Presented 3 oz   Oral Phase of Swallow WFL   Pharyngeal Phase of Swallow intact   Diagnostic Statement No overt s/sx of aspiration   Clinical Swallow Evaluation: Puree Solid Texture Trial   Mode of Presentation, Puree self-fed;spoon   Volume of Puree Presented 2 oz ice cream   Oral Phase, Puree WFL   Pharyngeal Phase, Puree intact   Diagnostic Statement No overt s/sx of aspiration   Clinical Swallow Evaluation: Solid Food Texture Trial   Mode of Presentation self-fed   Volume Presented 1 yandel cracker   Oral Phase WFL   Pharyngeal Phase intact   Diagnostic Statement No overt s/sx of aspiration   Esophageal Phase of Swallow   Patient reports or presents with symptoms of esophageal dysphagia Yes   Esophageal comments Pt with hx of diffuse B cell lymphoma of esophagus s/p rituximab and GERD.  Pt reports he has to chew foods very thoroughly but otherwise has no restrictions when eating and drinking.   Swallowing Recommendations   Diet Consistency Recommendations thin liquids (level 0);regular diet   Supervision Level for Intake patient independent   Mode of Delivery Recommendations bolus size, small;slow rate of intake   Monitoring/Assistance Required (Eating/Swallowing) stop eating activities when  fatigue is present   Recommended Feeding/Eating Techniques (Swallow Eval) patient is independent, no specific recommendations   Medication Administration Recommendations, Swallowing (SLP) Orally   Instrumental Assessment Recommendations instrumental evaluation not recommended at this time   Clinical Impression   Criteria for Skilled Therapeutic Interventions Met (SLP Eval) Evaluation only;No problems identified which require skilled intervention   SLP Diagnosis WNL oropharyngeal swallowing abilities   Risks & Benefits of therapy have been explained evaluation/treatment results reviewed;care plan/treatment goals reviewed;risks/benefits reviewed;current/potential barriers reviewed;participants voiced agreement with care plan;participants included;patient   Clinical Impression Comments Clinical swallow evaluation completed per MD order.  Pt presents with WNL oropharyngeal swallowing abilities.  Oral mech observed to be unremarkable.  Pt assessed with thin liquids via cup and straw, puree and regular solids.  Oral and pharyngeal phases observed to be unremarkable across consistencies.  Pt demonstrated no overt s/sx of aspiration throughout evaluation.  Recommend continue regular diet and thin liquids.  No further speech therapy warranted at this time.  Will complete orders.   SLP Discharge Planning   SLP Discharge Recommendation home with assist   SLP Rationale for DC Rec WNL oropharyngeal swallowing abiltiies   SLP Brief overview of current status  Recommend continue regular diet and thin liquids. No further speech therapy warranted at this time. Will complete orders.

## 2024-10-29 NOTE — CONSULTS
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 1.4*  --   --   --  1.5*   PHOS  --   --   --   --   --   --   --   --   --  3.3  --   --   --  3.7  --   --   --  2.9   PROTTOTAL  --   --   --   --   --   --   --   --   --  4.8*  --  4.8*  --  4.5*  --  4.7*   < > 5.1*   ALBUMIN  --   --   --   --   --   --   --   --   --  2.7*  --  2.4*  --  2.2*  --  2.2*   < > 2.5*   BILITOTAL  --   --   --   --   --   --   --   --   --  0.5  --  0.4  --  0.4  --  0.5   < > 0.5   ALKPHOS  --   --   --   --   --   --   --   --   --  110  --  117  --  104  --  113   < > 138   AST  --   --   --   --   --   --   --   --   --  45  --  47*  --  47*  --  48*   < > 38   ALT  --   --   --   --   --   --   --   --   --  24  --  29  --  33  --  34   < > 43    < > = values in this interval not displayed.     CBC  Recent Labs   Lab 10/28/24  0913 10/27/24  1298 10/26/24  0651 10/26/24  8805   WBC 7.1 3.8* 3.9* 3.9*   RBC 3.43* 2.65* 2.72* 2.98*   HGB 10.6* 8.2* 8.4* 9.2*   HCT 35.5* 26.5* 27.1* 29.6*   * 100 100 99   MCH 30.9 30.9 30.9 30.9   MCHC 29.9* 30.9* 31.0* 31.1*   RDW 16.6*  "16.4* 16.4* 16.3*    146* 141* 147*     BNPNo lab results found in last 7 days.  HsTropInvalid input(s): \"TROP\"    EKG: To be collected    ECHO:   Recent Results (from the past 4320 hours)   Echo Complete   Result Value    LVEF  35-40% (moderately reduced)    [facility]    [id number]  [id number]  [id number]  [id number]^PAUL^ZACHARIAH^ISABELLA     [facility]  Echocardiography Laboratory  [address] [place] [id number]     Name: YARED HAMLIN  MRN: [id number]  : 1955  Study Date: 10/28/2024 01:56 PM  Age: 69 yrs  Gender: Male  Patient Location: [facility]  Reason For Study: Dyspnea  Ordering Physician: ZACHARIAH MILLER  Referring Physician: KATE PHILIPPE  Performed By: Carmelita Walsh     BSA: 1.8 m2  Height: 67 in  Weight: 143 lb  HR: 105  BP: 159/97 mmHg  ______________________________________________________________________________  Procedure  Complete Portable Echo Adult. Contrast Optison. Optison (NDC #2364-8405-16)  given intravenously. Patient was given 6 ml mixture of 3 ml Optison and 6 ml  saline. 3 ml wasted. The patient's rhythm is atrial fibrillation.  ______________________________________________________________________________  Interpretation Summary  Left ventricular function is moderately reduced. Biplane EF is 38%.  Global right ventricular function is normal.  No significant valvular abnormalities present.  The inferior vena cava was normal in size with preserved respiratory  variability.  Compared to the prior study on 2024, the LV function is now moderately  reduced.  ______________________________________________________________________________  Left Ventricle  Left ventricular size is normal. Left ventricular wall thickness is normal.  Thickening of the anterobasal septum is present. Left ventricular function is  decreased. The ejection fraction is 35-40% (moderately reduced). Grade I or  early diastolic dysfunction. Moderate diffuse hypokinesis " is present. Regional  wall motion assessment limited by irregular rhythm.     Right Ventricle  The right ventricle is normal size. Global right ventricular function is  normal.     Atria  Both atria appear normal. The atrial septum is intact as assessed by color  Doppler .     Mitral Valve  Trace mitral insufficiency is present.     Aortic Valve  The aortic valve is tricuspid. No aortic regurgitation is present. No aortic  stenosis is present.     Tricuspid Valve  Trace tricuspid insufficiency is present. The right ventricular systolic  pressure is 30mmHg above the right atrial pressure. Pulmonary artery systolic  pressure is normal.     Pulmonic Valve  The pulmonic valve is normal.     Vessels  The aortic root is mildly dilated to 3.7 cm. The ascending aorta is mildly  dilated to 3.7 cm. The inferior vena cava was normal in size with preserved  respiratory variability. The pulmonary artery cannot be assessed.     Pericardium  No pericardial effusion is present.     Miscellaneous  No significant valvular abnormalities present.     Compared to Previous Study  Compared to the prior study on 1/11/2024, the LV function is now moderately  reduced.     Attestation  I have personally viewed the imaging and agree with the interpretation and  report as documented by the fellow, Cornelius Chowdhury, and/or edited by me.  ______________________________________________________________________________  MMode/2D Measurements & Calculations  IVSd: 1.2 cm  LVIDd: 4.5 cm  LVIDs: 4.0 cm  LVPWd: 0.92 cm  FS: 11.6 %  LV mass(C)d: 162.3 grams  LV mass(C)dI: 92.6 grams/m2  Ao root diam: 3.7 cm  asc Aorta Diam: 3.7 cm  LVOT diam: 2.4 cm  LVOT area: 4.6 cm2     EF(MOD-bp): 38.4 %  Ao root diam index Ht(cm/m): 2.2  Ao root diam index BSA (cm/m2): 2.1  Asc Ao diam index BSA (cm/m2): 2.1     Asc Ao diam index Ht(cm/m): 2.2  RWT: 0.41  TAPSE: 1.5 cm     Doppler Measurements & Calculations  MV E max chirss: 39.8 cm/sec  MV A max chriss: 67.3 cm/sec  MV  E/A: 0.59  PA acc time: 0.09 sec  TR max roberto: 272.7 cm/sec  TR max P.8 mmHg  E/E' av.0  Lateral E/e': 6.8  Medial E/e': 7.2  RV S Roberto: 8.9 cm/sec     ______________________________________________________________________________  Report approved by: Rach Contreras 10/28/2024 03:09 PM

## 2024-10-29 NOTE — PROGRESS NOTES
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  Recently s/p right BKA in August and left BKA on 24 and [facility].  Increased lethargy and confusion 10/24, seen in the [facility] ER with concern for pneumonia and UTI s/p IV fluids and ceftriaxone, transferred to [facility] for sepsis, started on broad spectrum ABX, IV fluids, and stress dose steroids.  The patient was transferred to [facility] on 10/26 for further work up of sepsis and encephalopathy, possible UTI as source.  Also with supratherapeutic tacrolimus level.     Today's recommendations:   - DSA (10/27) pending  - Fungal Ab and Histo blood Ag (10/25) pending  - Follow pending blood culture (10/25), sputum culture ordered if able to collect  - Diuresis per primary  - Cardiology consult given echo findings and cardiac history  - Wean supplemental oxygen to maintain SpO2 >92%  - SLP consult pending to evaluate for aspiration  - Tacrolimus level to trend subtherapeutic, resumed at decreased dose today given prior supratherapeutic levels, daily levels ordered through   - Hold chronic azithromycin while on ciprofloxacin, resume once course complete  - CMV weekly monitoring x4 given recent stress dose steroids (ordered, next )    - PO ciprofloxacin through  for total 14d course per transplant ID     Acute hypoxic respiratory  failure:  Concern for RUL/RML pneumonia:  S/p bilateral sequential lung transplant (BSLT) for A1AT:  Encephalopathy, Resolved: DSA negative 11/17/23.  Last seen by Dr. Murphy in May and at that time PFTs had improved to recent best (in two years).  Now admitted from OSH with sepsis and encephalopathy, likely UTI as source with possible pneumonia.  Also with supratherapeutic tacrolimus level possibly contributing to encephalopathy, now improved.  CT CAP (10/25) noted interval development of new hazy ill-defined pulmonary nodule involving RUL and RML, interval decrease in prior consolidation posterior RLL with minimal residual atelectasis remaining, minimal atelectasis LLL with stable chronic left basilar pleural fluid, no new lymphadenopathy, unchanged pancreatic cyst, and heterogeneous enhancement to a moderately enlarged prostate.  Has been on RA-2L NC (no oxygen use at baseline).  Tmax 102.8.  CRP, procal, and LA elevated.  S/p stress dose steroids (stopped 10/27).  MRSA swab, covid/influenza/RSV, Legionella/Strep pneumoniae (10/25) all negative, respiratory panel (10/26) negative.  Fungitell and A. galactomannan (10/25) negative.  Histo galactomannan Ag urine (10/26) negative.  EBV (10/27) negative.  Suspect hypoxia in part due to hypervolemia, CXR (10/27) with increased size of cardiac silhouette and increased pulmonary edema.  Repeat CXR (10/28) with decreased pulmonary edema with mild residual edematous and/or atelectatic changes and increased small left pleural effusion.  Echo (10/28) with EF 38%, normal RV, no significant valvular abnormalities.  - DSA (10/27) pending  - Fungal Ab and Histo blood Ag (10/25) pending  - Blood culture (10/25) NGTD  - Bacterial sputum culture ordered (10/29 with oral contamination)  - ABX as below per transplant ID  - If fungal testing remains negative would consider serial imaging for lung nodules per transplant ID  - Diuresis per primary  - Cardiology consult given echo findings  and cardiac history  - Nebs: Duoneb QID  - Wean supplemental oxygen to maintain SpO2 >92%  - SLP consult pending to evaluate for aspiration    Immunosuppression:  - Tacrolimus 1 mg BID (resumed at decreased dose 10/29, prior held since 10/26 evening given elevated level, prior dose 3 mg BID).  Goal level 8-10.  Level to trend 10/29 subtherapeutic at 6.4, continue current dose, daily levels ordered through 11/1.  - Prednisone 5 mg qAM / 2.5 mg qPM     Prophylaxis:   - Dapsone for PJP ppx  - S/p acyclovir for shingles prophylaxis s/p rituximab per chart (started on 4/23, stopped 10/27)     CLAD: PTA azithromycin 250 mg three times/week (for QTc of 460) --> hold while on ciprofloxacin, resume once course complete, Breo (substitute for Advair), and montelukast.     H/o recurrent CMV viremia: Last level (10/25) negative.  Per OP notes, pt. had been on chronic valcyte, but this was stopped secondary to cost.  - CMV weekly monitoring x4 given recent stress dose steroids (ordered, next 11/1)       Hypogammaglobulinemia: IgG low although adequate at 552 on 10/27, no indication for IVIG at this time.     Other relevant problems being managed by the primary team:     Probable urosepsis:  Prostatic enhancement see on CT: Admitted with sepsis and encephalopathy as above.  CT CAP findings as above.  Urine culture (10/24) with E. coli and Klebsiella pneumoniae.  Urine culture (10/25) negative.  Transplant ID consulted, see their note for details.   - ABX: PO ciprofloxacin (10/29-11/7) for total 14d course per transplant ID (signed off 10/29), s/p additional ceftriaxone (10/27-10/28), minocycline (10/26-10/28, h/o Steno), Zosyn (10/25-10/26), doxycycline (10/25-10/26), and IV vancomycin (10/25-10/27)     Pancytopenia: Possibly secondary to infection, improvement noted on CBC 10/28.  - Management per primary    We appreciate the excellent care provided by the [place] [id number] team.  Recommendations communicated via Vocera and this  note.  Will continue to follow along closely, please do not hesitate to call with any questions or concerns.    Patient discussed with Dr. Cristina.    Delma Henry PA-C  Inpatient [facility]  Pulmonary CF/Transplant     Subjective & Interval History:     Remains on 1-2L NC, breathing feels better today.  Minimally productive cough this morning.  Eating well, feels like swallow is better so far today.  Having loose stools, using Imodium.  Some burning with the start of urination, has external catheter.  Net negative 1.8L with diuresis yesterday.    Review of Systems:     C: No fever, no chills, no change in weight  INTEGUMENTARY/SKIN: No new rash  ENT/MOUTH: No sore throat, no sinus pain, no nasal congestion or drainage  RESP: See interval history  CV: No chest pain, no peripheral edema  GI: No nausea, no vomiting, no reflux symptoms  : See interval history  MUSCULOSKELETAL: + left knee pain  ENDOCRINE: Blood sugars with adequate control  NEURO: No headache  PSYCHIATRIC: Mood stable    Physical Exam:     All notes, images, and labs from past 24 hours (at minimum) were reviewed.    Vital signs:  Temp: 97.4  F (36.3  C) Temp src: Oral BP: (!) 138/90 Pulse: 103   Resp: 18 SpO2: 94 % O2 Device: Nasal cannula Oxygen Delivery: 2 LPM   Weight: 64.6 kg (142 lb 6.7 oz)  I/O:   Intake/Output Summary (Last 24 hours) at 10/29/2024 1116  Last data filed at 10/29/2024 0947  Gross per 24 hour   Intake 1290 ml   Output 1850 ml   Net -560 ml     Constitutional: Sitting up in bed, in no apparent distress.   HEENT: Eyes with pink conjunctivae, anicteric.  Oral mucosa moist without lesions.   PULM: Mildly diminished air flow to L>RLL.  No crackles, no rhonchi, no wheezes.  Non-labored breathing on 2L NC.  CV: Normal S1 and S2.  RRR.  No murmur, gallop, or rub.   ABD: NABS, soft, nontender, nondistended.    MSK: Moves all extremities.  No apparent muscle wasting.   NEURO: Alert and conversant.   SKIN: Warm, dry, fragile.  Ecchymosis to  bilateral arms.  PSYCH: Mood stable.     Data:     LABS    CMP:   Recent Labs   Lab 10/29/24  0806 10/29/24  0546 10/29/24  0254 10/29/24  0241 10/28/24  2140 10/28/24  2050 10/28/24  0755 10/28/24  0417 10/27/24  0916 10/27/24  0534 10/26/24  1829 10/26/24  1753 10/26/24  0728 10/26/24  0533 10/25/24  1859 10/25/24  1652 10/25/24  0119 10/25/24  0024   NA  --  141  --   --   --  144  --  146*  --  140  --  138  --  140  --  135   < > 134*   POTASSIUM  --  3.5  --   --   --  3.4  3.4  --  3.0*  --  3.7  --  3.2*  --  3.8  --  4.8  4.8   < > 4.4   CHLORIDE  --  106  --   --   --  108*  --  110*  --  106  --  102  --  104  --  103   < > 103   CO2  --  27  --   --   --  25  --  22  --  23  --  21*  --  21*  --  17*   < > 17*   ANIONGAP  --  8  --   --   --  11  --  14  --  11  --  15  --  15  --  15   < > 14   GLC 76 100* 102* 91   < > 110*   < > 45*   < > 150*   < > 151*   < > 198*   < > 119*   < > 78   BUN  --  28.4*  --   --   --  31.4*  --  36.7*  --  36.3*  --  30.0*  --  22.1  --  23.1*   < > 30.1*   CR  --  0.74  --   --   --  0.86  --  0.93  --  1.00  --  1.02  --  0.79  --  0.92   < > 0.82   GFRESTIMATED  --  >90  --   --   --  >90  --  88  --  80  --  79  --  >90  --  89   < > >90   ERIN  --  7.5*  --   --   --  7.4*  --  7.6*  --  7.4*  --  7.5*  --  7.6*  --  7.6*   < > 7.7*   MAG  --  1.7  --   --   --   --   --  2.3  --  2.6*  --  2.7*   < > 1.4*  --   --   --  1.5*   PHOS  --   --   --   --   --   --   --   --   --  3.3  --   --   --  3.7  --   --   --  2.9   PROTTOTAL  --   --   --   --   --   --   --   --   --  4.8*  --  4.8*  --  4.5*  --  4.7*   < > 5.1*   ALBUMIN  --   --   --   --   --   --   --   --   --  2.7*  --  2.4*  --  2.2*  --  2.2*   < > 2.5*   BILITOTAL  --   --   --   --   --   --   --   --   --  0.5  --  0.4  --  0.4  --  0.5   < > 0.5   ALKPHOS  --   --   --   --   --   --   --   --   --  110  --  117  --  104  --  113   < > 138   AST  --   --   --   --   --   --   --   --   --  45   "--  47*  --  47*  --  48*   < > 38   ALT  --   --   --   --   --   --   --   --   --  24  --  29  --  33  --  34   < > 43    < > = values in this interval not displayed.     CBC:   Recent Labs   Lab 10/28/24  0913 10/27/24  0534 10/26/24  2344 10/26/24  1753   WBC 7.1 3.8* 3.9* 3.9*   RBC 3.43* 2.65* 2.72* 2.98*   HGB 10.6* 8.2* 8.4* 9.2*   HCT 35.5* 26.5* 27.1* 29.6*   * 100 100 99   MCH 30.9 30.9 30.9 30.9   MCHC 29.9* 30.9* 31.0* 31.1*   RDW 16.6* 16.4* 16.4* 16.3*    146* 141* 147*       INR:   Recent Labs   Lab 10/27/24  0534   INR 1.59*       Glucose:   Recent Labs   Lab 10/29/24  0806 10/29/24  0546 10/29/24  0254 10/29/24  0241 10/29/24  0226 10/29/24  0209   GLC 76 100* 102* 91 72 65*       Blood Gas:   Recent Labs   Lab 10/27/24  0533 10/26/24  0533 10/25/24  2344   PHV 7.47* 7.46* 7.41   PCO2V 36* 33* 33*   PO2V 70* 48* 90*   HCO3V 26 24 21   CAMPBELL 2.5 0.2 -3.1*   O2PER 2 0 28       Culture Data No results for input(s): \"CULT\" in the last 168 hours.    Virology Data:   Lab Results   Component Value Date    INFLUA Negative 01/28/2014    FLUAH1 Not Detected 10/26/2024    FLUAH3 Not Detected 10/26/2024    NA7835 Not Detected 10/26/2024    IFLUB Not Detected 10/26/2024    RSVA Not Detected 10/26/2024    RSVB Not Detected 10/26/2024    PIV1 Not Detected 10/26/2024    PIV2 Not Detected 10/26/2024    PIV3 Not Detected 10/26/2024    HMPV Not Detected 10/26/2024    HRVS Negative 02/24/2019    ADVBE Negative 02/24/2019    ADVC Negative 02/24/2019    ADVC Negative 08/05/2014    ADVC Negative 06/03/2014       Historical CMV results (last 3 of prior testing):  Lab Results   Component Value Date    CMVQNT Not Detected 10/25/2024    CMVQNT Not Detected 07/12/2024    CMVQNT Not Detected 05/16/2024     Lab Results   Component Value Date    CMVLOG Not Calculated 06/03/2021    CMVLOG Not Calculated 05/12/2021    CMVLOG Not Calculated 02/19/2020       Urine Studies    Recent Labs   Lab Test 10/25/24  1250 " 08/29/24  1522   URINEPH 5.0 5.0   NITRITE Negative Negative   LEUKEST Trace* Negative   WBCU 12* 1       Most Recent [place] Pulmonary Function Testing (FVC/FEV1 only)  FVC-Pre   Date Value Ref Range Status   05/16/2024 3.69 L    11/17/2023 3.67 L    05/25/2023 3.74 L    11/17/2022 3.68 L      FVC-%Pred-Pre   Date Value Ref Range Status   05/16/2024 101 %    11/17/2023 100 %    05/25/2023 93 %    11/17/2022 91 %      FEV1-Pre   Date Value Ref Range Status   05/16/2024 3.03 L    11/17/2023 2.93 L    05/25/2023 2.94 L    11/17/2022 2.80 L      FEV1-%Pred-Pre   Date Value Ref Range Status   05/16/2024 108 %    11/17/2023 104 %    05/25/2023 96 %    11/17/2022 91 %        IMAGING    Recent Results (from the past 48 hours)   XR Chest Port 1 View    Narrative    Exam: XR CHEST PORT 1 VIEW, 10/28/2024 10:42 AM    Comparison: Chest radiograph, 10/27/2024    History: assess interval change in pulmonary edema    Findings:  Frontal semiupright radiograph of the chest was obtained. Postsurgical  changes of bilateral lung transplantation with unchanged appearance of  clamshell sternotomy wires. Stable cardiac silhouette. Decreased  vascular congestion and Kerley B lines compared with prior  examination. Left-sided costophrenic blunting. The right costophrenic  angle is clear. No pneumothorax. Improved basilar predominant hazy  airspace opacities.      Impression    Impression:   1. Decreased pulmonary edema, with mild residual edematous changes  and/or atelectatic changes.  2. Small left pleural effusion, increased from prior examination.    I have personally reviewed the examination and initial interpretation  and I agree with the findings.    JACOBY SEVILLA MD         SYSTEM ID:  [id number]   Echo Complete   Result Value    LVEF  35-40% (moderately reduced)    Narrative    [id number]  [id number]  [id number]  [id number]^PAUL^ZACHARIAH^ISABELLA     [facility]  Echocardiography Laboratory  [address] [place] [address]  [place] [address]     Name: YARED HAMLIN  MRN: [id number]  : 1955  Study Date: 10/28/2024 01:56 PM  Age: 69 yrs  Gender: Male  Patient [facility]: [facility]  Reason For Study: Dyspnea  Ordering Physician: ZACHARIAH MILLER  Referring Physician: KATE PHILIPPE  Performed By: Carmelita Walsh     BSA: 1.8 m2  Height: 67 in  Weight: 143 lb  HR: 105  BP: 159/97 mmHg  ______________________________________________________________________________  Procedure  Complete Portable Echo Adult. Contrast Optison. Optison (NDC #0037-4226-18)  given intravenously. Patient was given 6 ml mixture of 3 ml Optison and 6 ml  saline. 3 ml wasted. The patient's rhythm is atrial fibrillation.  ______________________________________________________________________________  Interpretation Summary  Left ventricular function is moderately reduced. Biplane EF is 38%.  Global right ventricular function is normal.  No significant valvular abnormalities present.  The inferior vena cava was normal in size with preserved respiratory  variability.  Compared to the prior study on 2024, the LV function is now moderately  reduced.  ______________________________________________________________________________  Left Ventricle  Left ventricular size is normal. Left ventricular wall thickness is normal.  Thickening of the anterobasal septum is present. Left ventricular function is  decreased. The ejection fraction is 35-40% (moderately reduced). Grade I or  early diastolic dysfunction. Moderate diffuse hypokinesis is present. Regional  wall motion assessment limited by irregular rhythm.     Right Ventricle  The right ventricle is normal size. Global right ventricular function is  normal.     Atria  Both atria appear normal. The atrial septum is intact as assessed by color  Doppler .     Mitral Valve  Trace mitral insufficiency is present.     Aortic Valve  The aortic valve is tricuspid. No aortic regurgitation is present. No  aortic  stenosis is present.     Tricuspid Valve  Trace tricuspid insufficiency is present. The right ventricular systolic  pressure is 30mmHg above the right atrial pressure. Pulmonary artery systolic  pressure is normal.     Pulmonic Valve  The pulmonic valve is normal.     Vessels  The aortic root is mildly dilated to 3.7 cm. The ascending aorta is mildly  dilated to 3.7 cm. The inferior vena cava was normal in size with preserved  respiratory variability. The pulmonary artery cannot be assessed.     Pericardium  No pericardial effusion is present.     Miscellaneous  No significant valvular abnormalities present.     Compared to Previous Study  Compared to the prior study on 2024, the LV function is now moderately  reduced.     Attestation  I have personally viewed the imaging and agree with the interpretation and  report as documented by the fellow, Cornelius Chowdhury, and/or edited by me.  ______________________________________________________________________________  MMode/2D Measurements & Calculations  IVSd: 1.2 cm  LVIDd: 4.5 cm  LVIDs: 4.0 cm  LVPWd: 0.92 cm  FS: 11.6 %  LV mass(C)d: 162.3 grams  LV mass(C)dI: 92.6 grams/m2  Ao root diam: 3.7 cm  asc Aorta Diam: 3.7 cm  LVOT diam: 2.4 cm  LVOT area: 4.6 cm2     EF(MOD-bp): 38.4 %  Ao root diam index Ht(cm/m): 2.2  Ao root diam index BSA (cm/m2): 2.1  Asc Ao diam index BSA (cm/m2): 2.1     Asc Ao diam index Ht(cm/m): 2.2  RWT: 0.41  TAPSE: 1.5 cm     Doppler Measurements & Calculations  MV E max roberto: 39.8 cm/sec  MV A max roberto: 67.3 cm/sec  MV E/A: 0.59  PA acc time: 0.09 sec  TR max roberto: 272.7 cm/sec  TR max P.8 mmHg  E/E' av.0  Lateral E/e': 6.8  Medial E/e': 7.2  RV S Roberto: 8.9 cm/sec     ______________________________________________________________________________  Report approved by: Rach Contreras 10/28/2024 03:09 PM

## 2024-10-30 ENCOUNTER — APPOINTMENT (OUTPATIENT)
Dept: PHYSICAL THERAPY | Facility: CLINIC | Age: 69
DRG: 871 | End: 2024-10-30
Payer: MEDICARE

## 2024-10-30 ENCOUNTER — APPOINTMENT (OUTPATIENT)
Dept: OCCUPATIONAL THERAPY | Facility: CLINIC | Age: 69
DRG: 871 | End: 2024-10-30
Payer: MEDICARE

## 2024-10-30 LAB
ANION GAP SERPL CALCULATED.3IONS-SCNC: 10 MMOL/L (ref 7–15)
ASPERGILLUS AB TITR SER CF: NORMAL {TITER}
ATRIAL RATE - MUSE: 88 BPM
B DERMAT AB SER-ACNC: 0.3 IV
BACTERIA BLD CULT: NO GROWTH
BACTERIA SPT CULT: NORMAL
BUN SERPL-MCNC: 27.6 MG/DL (ref 8–23)
CALCIUM SERPL-MCNC: 7.8 MG/DL (ref 8.8–10.4)
CHLORIDE SERPL-SCNC: 106 MMOL/L (ref 98–107)
COCCIDIOIDES AB TITR SER CF: NORMAL {TITER}
CREAT SERPL-MCNC: 0.69 MG/DL (ref 0.67–1.17)
DIASTOLIC BLOOD PRESSURE - MUSE: NORMAL MMHG
EGFRCR SERPLBLD CKD-EPI 2021: >90 ML/MIN/1.73M2
ERYTHROCYTE [DISTWIDTH] IN BLOOD BY AUTOMATED COUNT: 16.4 % (ref 10–15)
GLUCOSE BLDC GLUCOMTR-MCNC: 104 MG/DL (ref 70–99)
GLUCOSE BLDC GLUCOMTR-MCNC: 106 MG/DL (ref 70–99)
GLUCOSE BLDC GLUCOMTR-MCNC: 225 MG/DL (ref 70–99)
GLUCOSE BLDC GLUCOMTR-MCNC: 82 MG/DL (ref 70–99)
GLUCOSE BLDC GLUCOMTR-MCNC: 94 MG/DL (ref 70–99)
GLUCOSE SERPL-MCNC: 110 MG/DL (ref 70–99)
GRAM STAIN RESULT: NORMAL
H CAPSUL MYC AB TITR SER CF: NORMAL {TITER}
H CAPSUL YST AB TITR SER CF: NORMAL {TITER}
HCO3 SERPL-SCNC: 27 MMOL/L (ref 22–29)
HCT VFR BLD AUTO: 30.2 % (ref 40–53)
HGB BLD-MCNC: 9.4 G/DL (ref 13.3–17.7)
INTERPRETATION ECG - MUSE: NORMAL
LACTATE SERPL-SCNC: 1.9 MMOL/L (ref 0.7–2)
MAGNESIUM SERPL-MCNC: 1.9 MG/DL (ref 1.7–2.3)
MCH RBC QN AUTO: 30.8 PG (ref 26.5–33)
MCHC RBC AUTO-ENTMCNC: 31.1 G/DL (ref 31.5–36.5)
MCV RBC AUTO: 99 FL (ref 78–100)
P AXIS - MUSE: 0 DEGREES
PLATELET # BLD AUTO: 171 10E3/UL (ref 150–450)
POTASSIUM SERPL-SCNC: 4 MMOL/L (ref 3.4–5.3)
PR INTERVAL - MUSE: 150 MS
QRS DURATION - MUSE: 74 MS
QT - MUSE: 430 MS
QTC - MUSE: 520 MS
R AXIS - MUSE: -29 DEGREES
RBC # BLD AUTO: 3.05 10E6/UL (ref 4.4–5.9)
SODIUM SERPL-SCNC: 143 MMOL/L (ref 135–145)
SYSTOLIC BLOOD PRESSURE - MUSE: NORMAL MMHG
T AXIS - MUSE: 150 DEGREES
TACROLIMUS BLD-MCNC: 6.3 UG/L (ref 5–15)
TME LAST DOSE: NORMAL H
TME LAST DOSE: NORMAL H
VENTRICULAR RATE- MUSE: 88 BPM
WBC # BLD AUTO: 6.4 10E3/UL (ref 4–11)

## 2024-10-30 PROCEDURE — 250N000013 HC RX MED GY IP 250 OP 250 PS 637

## 2024-10-30 PROCEDURE — 80197 ASSAY OF TACROLIMUS: CPT | Performed by: PHYSICIAN ASSISTANT

## 2024-10-30 PROCEDURE — 99233 SBSQ HOSP IP/OBS HIGH 50: CPT | Mod: GC | Performed by: STUDENT IN AN ORGANIZED HEALTH CARE EDUCATION/TRAINING PROGRAM

## 2024-10-30 PROCEDURE — 97530 THERAPEUTIC ACTIVITIES: CPT | Mod: GP

## 2024-10-30 PROCEDURE — 99233 SBSQ HOSP IP/OBS HIGH 50: CPT | Mod: 24 | Performed by: NURSE PRACTITIONER

## 2024-10-30 PROCEDURE — 94799 UNLISTED PULMONARY SVC/PX: CPT

## 2024-10-30 PROCEDURE — 999N000157 HC STATISTIC RCP TIME EA 10 MIN

## 2024-10-30 PROCEDURE — 87070 CULTURE OTHR SPECIMN AEROBIC: CPT | Performed by: PHYSICIAN ASSISTANT

## 2024-10-30 PROCEDURE — 250N000012 HC RX MED GY IP 250 OP 636 PS 637: Performed by: PHYSICIAN ASSISTANT

## 2024-10-30 PROCEDURE — 83735 ASSAY OF MAGNESIUM: CPT

## 2024-10-30 PROCEDURE — 120N000003 HC R&B IMCU UMMC

## 2024-10-30 PROCEDURE — 36415 COLL VENOUS BLD VENIPUNCTURE: CPT | Performed by: PHYSICIAN ASSISTANT

## 2024-10-30 PROCEDURE — 97161 PT EVAL LOW COMPLEX 20 MIN: CPT | Mod: GP

## 2024-10-30 PROCEDURE — 85014 HEMATOCRIT: CPT

## 2024-10-30 PROCEDURE — 93005 ELECTROCARDIOGRAM TRACING: CPT

## 2024-10-30 PROCEDURE — 97165 OT EVAL LOW COMPLEX 30 MIN: CPT | Mod: GO

## 2024-10-30 PROCEDURE — 83605 ASSAY OF LACTIC ACID: CPT

## 2024-10-30 PROCEDURE — 250N000012 HC RX MED GY IP 250 OP 636 PS 637

## 2024-10-30 PROCEDURE — 99233 SBSQ HOSP IP/OBS HIGH 50: CPT | Mod: 24 | Performed by: INTERNAL MEDICINE

## 2024-10-30 PROCEDURE — 80048 BASIC METABOLIC PNL TOTAL CA: CPT

## 2024-10-30 PROCEDURE — 93010 ELECTROCARDIOGRAM REPORT: CPT | Performed by: INTERNAL MEDICINE

## 2024-10-30 PROCEDURE — 97530 THERAPEUTIC ACTIVITIES: CPT | Mod: GO

## 2024-10-30 PROCEDURE — 250N000009 HC RX 250: Performed by: STUDENT IN AN ORGANIZED HEALTH CARE EDUCATION/TRAINING PROGRAM

## 2024-10-30 PROCEDURE — 87205 SMEAR GRAM STAIN: CPT | Performed by: PHYSICIAN ASSISTANT

## 2024-10-30 PROCEDURE — 250N000011 HC RX IP 250 OP 636: Performed by: STUDENT IN AN ORGANIZED HEALTH CARE EDUCATION/TRAINING PROGRAM

## 2024-10-30 PROCEDURE — 94640 AIRWAY INHALATION TREATMENT: CPT | Mod: 76

## 2024-10-30 PROCEDURE — 94640 AIRWAY INHALATION TREATMENT: CPT

## 2024-10-30 PROCEDURE — 250N000012 HC RX MED GY IP 250 OP 636 PS 637: Performed by: NURSE PRACTITIONER

## 2024-10-30 PROCEDURE — 82374 ASSAY BLOOD CARBON DIOXIDE: CPT

## 2024-10-30 PROCEDURE — 97542 WHEELCHAIR MNGMENT TRAINING: CPT | Mod: GP

## 2024-10-30 RX ORDER — SPIRONOLACTONE 25 MG
12.5 TABLET ORAL DAILY
Status: DISCONTINUED | OUTPATIENT
Start: 2024-10-31 | End: 2024-11-05 | Stop reason: HOSPADM

## 2024-10-30 RX ORDER — FUROSEMIDE 10 MG/ML
40 INJECTION INTRAMUSCULAR; INTRAVENOUS ONCE
Status: COMPLETED | OUTPATIENT
Start: 2024-10-30 | End: 2024-10-30

## 2024-10-30 RX ORDER — LOPERAMIDE HYDROCHLORIDE 2 MG/1
2 CAPSULE ORAL 3 TIMES DAILY PRN
Status: DISCONTINUED | OUTPATIENT
Start: 2024-10-30 | End: 2024-11-05 | Stop reason: HOSPADM

## 2024-10-30 RX ORDER — LOPERAMIDE HYDROCHLORIDE 2 MG/1
4 CAPSULE ORAL EVERY MORNING
Status: DISCONTINUED | OUTPATIENT
Start: 2024-10-31 | End: 2024-11-05 | Stop reason: HOSPADM

## 2024-10-30 RX ADMIN — APIXABAN 5 MG: 5 TABLET, FILM COATED ORAL at 09:25

## 2024-10-30 RX ADMIN — PREDNISONE 5 MG: 5 TABLET ORAL at 09:26

## 2024-10-30 RX ADMIN — PREDNISONE 2.5 MG: 2.5 TABLET ORAL at 21:03

## 2024-10-30 RX ADMIN — LOPERAMIDE HYDROCHLORIDE 2 MG: 2 CAPSULE ORAL at 11:59

## 2024-10-30 RX ADMIN — CARVEDILOL 6.25 MG: 6.25 TABLET, FILM COATED ORAL at 09:27

## 2024-10-30 RX ADMIN — PANTOPRAZOLE SODIUM 40 MG: 40 TABLET, DELAYED RELEASE ORAL at 09:26

## 2024-10-30 RX ADMIN — DAPSONE 50 MG: 25 TABLET ORAL at 09:26

## 2024-10-30 RX ADMIN — FLUTICASONE FUROATE AND VILANTEROL TRIFENATATE 1 PUFF: 200; 25 POWDER RESPIRATORY (INHALATION) at 09:28

## 2024-10-30 RX ADMIN — TACROLIMUS 1.5 MG: 1 CAPSULE ORAL at 17:51

## 2024-10-30 RX ADMIN — Medication 2 CAPSULE: at 21:03

## 2024-10-30 RX ADMIN — LOPERAMIDE HYDROCHLORIDE 2 MG: 2 CAPSULE ORAL at 06:20

## 2024-10-30 RX ADMIN — LOPERAMIDE HYDROCHLORIDE 2 MG: 2 CAPSULE ORAL at 21:08

## 2024-10-30 RX ADMIN — Medication 60 ML: at 11:59

## 2024-10-30 RX ADMIN — LOPERAMIDE HYDROCHLORIDE 2 MG: 2 CAPSULE ORAL at 17:11

## 2024-10-30 RX ADMIN — IPRATROPIUM BROMIDE AND ALBUTEROL SULFATE 3 ML: .5; 3 SOLUTION RESPIRATORY (INHALATION) at 19:39

## 2024-10-30 RX ADMIN — ROSUVASTATIN CALCIUM 20 MG: 20 TABLET, FILM COATED ORAL at 09:26

## 2024-10-30 RX ADMIN — TACROLIMUS 1 MG: 1 CAPSULE ORAL at 09:26

## 2024-10-30 RX ADMIN — CARVEDILOL 6.25 MG: 6.25 TABLET, FILM COATED ORAL at 17:51

## 2024-10-30 RX ADMIN — IPRATROPIUM BROMIDE AND ALBUTEROL SULFATE 3 ML: .5; 3 SOLUTION RESPIRATORY (INHALATION) at 07:55

## 2024-10-30 RX ADMIN — ASPIRIN 81 MG: 81 TABLET ORAL at 09:26

## 2024-10-30 RX ADMIN — CIPROFLOXACIN HYDROCHLORIDE 500 MG: 250 TABLET, FILM COATED ORAL at 09:26

## 2024-10-30 RX ADMIN — VENLAFAXINE HYDROCHLORIDE 37.5 MG: 37.5 CAPSULE, EXTENDED RELEASE ORAL at 09:27

## 2024-10-30 RX ADMIN — ACETAMINOPHEN 650 MG: 325 TABLET, FILM COATED ORAL at 21:08

## 2024-10-30 RX ADMIN — MONTELUKAST 10 MG: 10 TABLET, FILM COATED ORAL at 21:04

## 2024-10-30 RX ADMIN — APIXABAN 5 MG: 5 TABLET, FILM COATED ORAL at 21:03

## 2024-10-30 RX ADMIN — CIPROFLOXACIN HYDROCHLORIDE 500 MG: 250 TABLET, FILM COATED ORAL at 21:03

## 2024-10-30 RX ADMIN — FUROSEMIDE 40 MG: 10 INJECTION, SOLUTION INTRAVENOUS at 14:21

## 2024-10-30 RX ADMIN — IPRATROPIUM BROMIDE AND ALBUTEROL SULFATE 3 ML: .5; 3 SOLUTION RESPIRATORY (INHALATION) at 16:06

## 2024-10-30 RX ADMIN — Medication 1 TABLET: at 11:59

## 2024-10-30 RX ADMIN — IPRATROPIUM BROMIDE AND ALBUTEROL SULFATE 3 ML: .5; 3 SOLUTION RESPIRATORY (INHALATION) at 12:05

## 2024-10-30 RX ADMIN — Medication 500 MG: at 22:55

## 2024-10-30 RX ADMIN — SALINE NASAL SPRAY 1 SPRAY: 1.5 SOLUTION NASAL at 06:21

## 2024-10-30 RX ADMIN — SALINE NASAL SPRAY 1 SPRAY: 1.5 SOLUTION NASAL at 01:46

## 2024-10-30 NOTE — PROGRESS NOTES
10/30/24 1400   Appointment Info   Signing Clinician's Name / Credentials (OT) Latasha Moore, OTR/L   Living Environment   People in Home spouse   Current Living Arrangements house   Home Accessibility stairs to enter home   Number of Stairs, Main Entrance 2   Stair Railings, Main Entrance none   Transportation Anticipated family or friend will provide   Living Environment Comments Pt lives in a house with his wife with 2 small BRIEN, all needs can be met on the main level. Pt was recently at TCU prior to current hospital admission.   Self-Care   Usual Activity Tolerance moderate   Current Activity Tolerance moderate   Regular Exercise Yes   Activity/Exercise Type other (see comments)  (PT/OT at TCU)   Exercise Amount/Frequency daily;1 hr   Equipment Currently Used at Home hospital bed;walker, rolling;wheelchair, manual  (bed pan)   Fall history within last six months no   Activity/Exercise/Self-Care Comment Pt was progressing well at TCU PTA with slide board transfers 2/2 recent bilateral BKAs. per pt and wife, pt has all AE needed at home, was planning to d/c to home from TCU, plans for prosthetic fitting soon.   Instrumental Activities of Daily Living (IADL)   IADL Comments Pts wife assists with all IADLs since recent BKAs.   General Information   Onset of Illness/Injury or Date of Surgery 10/26/24   Referring Physician Jonny Sutherland MD   Patient/Family Therapy Goal Statement (OT) return home, maintain strength and independence   Additional Occupational Profile Info/Pertinent History of Current Problem Per chart: Aubrey Duncan is a 69 year old male. He has CAD, DLBCL of esophagus (s/p rituximab), HLD, DM2, CKD 3b, Bilateral lung transplant 2/2 COPD and Alpha-1-antitrypsin deficiency in 2013, GERD, Anemia, and recent BKA who presents for sepsis. Pt initially presented to [facility] in [place] for lethargy on 10/24/24 transferred to our facility on 10/26 for further workup of shock. By the time of arrival  at [facility], shock and encephalopathy had resolved. Transplant pulmonology and transplant ID were consulted and patient is receiving treatment for UTI as well as AHRF.   Existing Precautions/Restrictions fall   Left Upper Extremity (Weight-bearing Status) full weight-bearing (FWB)   Right Upper Extremity (Weight-bearing Status) full weight-bearing (FWB)   Left Lower Extremity (Weight-bearing Status) non weight-bearing (NWB)   Right Lower Extremity (Weight-bearing Status) non weight-bearing (NWB)   Cognitive Status Examination   Orientation Status orientation to person, place and time   Cognitive Status Comments Cognition intact, follows all commands, good insight to safety and deficits.   Visual Perception   Visual Impairment/Limitations WNL   Sensory   Sensory Quick Adds sensation intact   Sensory Comments Some phantom pain noted in LLE from recent BKA.   Pain Assessment   Patient Currently in Pain No   Posture   Posture forward head position   Range of Motion Comprehensive   Comment, General Range of Motion BUE WOM WNL   Strength Comprehensive (MMT)   Comment, General Manual Muscle Testing (MMT) Assessment BUE strength WNL   Coordination   Upper Extremity Coordination No deficits were identified   Gross Motor Coordination No deficits identified   Fine Motor Coordination Not tested   Bed Mobility   Comment (Bed Mobility) SBA supine to sit   Transfers   Transfer Comments CGA slide board to and from wheelchair   Balance   Balance Comments SBA for seated balance   Activities of Daily Living   BADL Assessment/Intervention bathing;lower body dressing;grooming;toileting   Bathing Assessment/Intervention   Comment, (Bathing) modA per clinical judgement   Lower Body Dressing Assessment/Training   Comment, (Lower Body Dressing) SBA   Grooming Assessment/Training   Comment, (Grooming) Memo per clinical judgement   Toileting   Comment, (Toileting) Memo per clinical judgement   Clinical Impression   Criteria for Skilled  Therapeutic Interventions Met (OT) Yes, treatment indicated   OT Diagnosis Decreased ADL-I   OT Problem List-Impairments impacting ADL problems related to;activity tolerance impaired;mobility;strength;post-surgical precautions  (fatigue)   Assessment of Occupational Performance 5 or more Performance Deficits   Identified Performance Deficits ambulation, transferring, bathing, toileting, dressing, home management   Planned Therapy Interventions (OT) ADL retraining;strengthening;transfer training;home program guidelines;progressive activity/exercise   Clinical Decision Making Complexity (OT) problem focused assessment/low complexity   Risk & Benefits of therapy have been explained evaluation/treatment results reviewed;care plan/treatment goals reviewed;risks/benefits reviewed;current/potential barriers reviewed;participants voiced agreement with care plan;participants included;patient;spouse/significant other   OT Total Evaluation Time   OT Eval, Low Complexity Minutes (55866) 6   OT Goals   Therapy Frequency (OT) 5 times/week   OT Predicted Duration/Target Date for Goal Attainment 11/13/24   OT: Hygiene/Grooming modified independent;from wheelchair;using adaptive equipment;within precautions   OT: Lower Body Bathing Modified independent;using adaptive equipment;with precautions   OT: Toilet Transfer/Toileting Modified independent;cleaning and garment management;using adaptive equipment;within precautions   OT: Goal 1 Pt will complete full body bathing task with mod-I and appropriate use of AE by discharge.   Therapeutic Activities   Therapeutic Activity Minutes (47576) 40   Symptoms noted during/after treatment fatigue   Treatment Detail/Skilled Intervention OT: Pt supine in bed upon arrival, pleasant and agreeable to therapy session with wife present. Eval completed and tx initiated. Pt performed bed mobility from supine to sit with close SBA, pt donned compression garments on BLEs while sitting upright. Pt then  completed side board transfer from bed to wheelchair with arms taken off with close CGA. Pt then completed wheelchair propulsion in hallway for ~150 feet with SBA. Discussed having wife bring wheelchair and slide board from home to hospital to simulate home environment as much as possible. Pt returned back to room, transferred from wheelchair back to EOB as above. Pt tolerated activity very well, returned to supine with SBA.  provided pt with written handout on resistive band exercises for BUE strengthening and red theraband. Pt familiar with all theraband exercises.  also ordered pt drop-arm commode for toileting. Pt and wife agreeable to return back to TCU close to home as pt currently has bed hold there. Pt was left supine in bed with all needs met at end of session. VSS on RA.   OT Discharge Planning   OT Plan OT: drop arm commode transfers with slideboard, slideboard transfers, seated ADLs   OT Discharge Recommendation (DC Rec) Transitional Care Facility   OT Rationale for DC Rec Pt continues to be below baseline level of function and would benefit from further therapy to progress functional independence with all mobility and ADLs for safe return home.   Total Session Time   Timed Code Treatment Minutes 40   Total Session Time (sum of timed and untimed services) 46

## 2024-10-30 NOTE — PLAN OF CARE
BP (!) 147/92 (BP Location: Left arm)   Pulse 90   Temp 97.5  F (36.4  C) (Oral)   Resp 16   Wt 64.1 kg (141 lb 5 oz)   SpO2 97%   BMI 22.13 kg/m      Cardiac: VSS.  Neuro: Aox4.   Respiratory: Sating > 90% on 3 L NC.  Pain/Nausea/PRN: L knee pain. PRN Tylenol x1.  Diet: 2g Na+ diet, 2 L FR.  LDA: R PIV - SL.  GI/: LBML 10/30, frequent + loose stools, PRN imodium x2. Adequate urine output via Primofit.  Skin: No new deficits noted.  Mobility: A2 lift.

## 2024-10-30 NOTE — PLAN OF CARE
[id number]  Temp: 97.9  F (36.6  C) Temp src: Oral BP: 128/77 Pulse: 87   Resp: 17 SpO2: 91 % O2 Device: None (Room air) Oxygen Delivery: 3 LPM     Admit 10/26 sepsis.    Neuro: A&O x4  Cardiac: Afebrile, Regular rate and rhythm.  Resp: VSS on RA.  Diet: 2g Na, 2 LFR. ACHS meals and before bed.  GI/: Adequate output with urinal. 10/30. Watery stools.  Skin: No new deficits noted. Dressing on left stump redone on shift. Extravasations and infiltrations resolved.  LDAs: RPIV SL. Erythema at site. Keeping line as he is a hard poke.  Activity: Up Ax2. Matt BKA.     Plan: Continue to monitor and follow POC. Plan for angiogram Monday, off Apixiban Saturday. Notify Maroon 1 with changes .    Problem: Infection  Goal: Absence of Infection Signs and Symptoms  Outcome: Progressing     Problem: Sepsis/Septic Shock  Goal: Blood Glucose Level Within Targeted Range  Intervention: Optimize Glycemic Control  Recent Flowsheet Documentation  Taken 10/30/2024 1630 by Mario Moura, RN  Hyperglycemia Management: blood glucose monitored  Hypoglycemia Management: blood glucose monitored  Taken 10/30/2024 1155 by Mario Moura RN  Hyperglycemia Management: blood glucose monitored  Hypoglycemia Management: blood glucose monitored  Taken 10/30/2024 0920 by Mario Moura, RN  Hyperglycemia Management: blood glucose monitored  Hypoglycemia Management: blood glucose monitored   Goal Outcome Evaluation:

## 2024-10-30 NOTE — PROGRESS NOTES
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  Recently s/p right BKA in August and left BKA on 24 and has been at [facility] since surgery in September.  Increased lethargy and confusion 10/24, seen in the [facility] ER with concern for pneumonia and UTI s/p IV fluids and ceftriaxone, [facility] started on broad spectrum ABX, IV fluids, and stress dose steroids.  The patient was transferred to [facility] on 10/26 for further work up of sepsis and encephalopathy, possible UTI as source.  Also with supratherapeutic tacrolimus level.     Today's recommendations:   - DSA (10/27) pending  - Fungal Ab and Histo blood Ag (10/25) pending; per transplant ID If fungal testing remains negative then would consider serial imaging for the lung nodules.  Of note, patient is due for repeat PET for interval follow up PTLD of duodenum, recommend follow-up with oncology on discharge  - Follow pending blood culture (10/25), sputum culture ordered if able to re-collect  - Diuresis per primary  - Cardiology consulted given echo findings and cardiac history, awaiting recommendations  - Wean supplemental oxygen to maintain SpO2 >92%  - Tacrolimus level to trend remains subtherapeutic at 6.3 (11 hrs), dose increased, daily levels ordered through .  - Hold chronic azithromycin while on ciprofloxacin, resume  once course complete  - CMV weekly monitoring x4 given recent stress dose steroids (ordered, next 11/1)    - PO ciprofloxacin through 11/7 for total 14d course for UTI per transplant ID  - Pulmonary toilet with IS and Aerobika q1-2 hr as able     Acute hypoxic respiratory failure:  Concern for RUL/RML pneumonia:  S/p bilateral sequential lung transplant (BSLT) for A1AT:  Encephalopathy, Resolved: DSA negative 11/17/23.  Last seen by Dr. Murphy in May and at that time PFTs had improved to recent best (in two years).  Now admitted from OSH with sepsis and encephalopathy, likely UTI as source with possible pneumonia.  Also with supratherapeutic tacrolimus level possibly contributing to encephalopathy, now improved.  CT CAP (10/25) noted interval development of new hazy ill-defined pulmonary nodule involving RUL and RML, interval decrease in prior consolidation posterior RLL with minimal residual atelectasis remaining, minimal atelectasis LLL with stable chronic left basilar pleural fluid, no new lymphadenopathy, unchanged pancreatic cyst, and heterogeneous enhancement to a moderately enlarged prostate.  Has been on RA-2L NC (no oxygen use at baseline).  Tmax 102.8.  CRP, procal, and LA elevated, all now improving.  S/p stress dose steroids (stopped 10/27).  MRSA swab, covid/influenza/RSV, Legionella/Strep pneumoniae (10/25) all negative, respiratory panel (10/26) negative.  Fungitell and A. galactomannan (10/25) negative.  Histo galactomannan Ag urine (10/26) negative.  EBV (10/27) negative.  Suspect hypoxia in part due to hypervolemia, CXR (10/27) with increased size of cardiac silhouette and increased pulmonary edema.  Repeat CXR (10/28) with decreased pulmonary edema with mild residual edematous and/or atelectatic changes and increased small left pleural effusion.  Echo (10/28) with EF 38%, normal RV, no significant valvular abnormalities.  - DSA (10/27) pending  - Fungal Ab and Histo blood Ag (10/25) pending; per  transplant ID If fungal testing remains negative then would consider serial imaging for the lung nodules.  Of note, patient is due for repeat PET for interval follow up PTLD of duodenum, recommend follow-up with oncology on discharge  - Blood culture (10/25) NGTD  - Bacterial sputum culture ordered (10/29 with oral contamination), recollect as able  - ABX as below per transplant ID  - Diuresis per primary  - Cardiology consulted given echo findings and cardiac history, awaiting recommendations  - Nebs: Duoneb QID  - Wean supplemental oxygen to maintain SpO2 >92%  - Pulmonary toilet with IS and Aerobika q1-2 hr as able  - SLP consulted 10/29, s/p bedside eval and OK for regular diet/thin liquids     Immunosuppression:  - Tacrolimus 1 mg BID (resumed at decreased dose 10/29, prior held since 10/26 evening given elevated level, prior dose 3 mg BID).  Goal level 8-10.  Level to trend 10/30 remains subtherapeutic at 6.3 (11 hrs), dose increased to 1.5 mg BID, daily levels ordered through 11/2.  - Prednisone 5 mg qAM / 2.5 mg qPM     Prophylaxis:   - Dapsone for PJP ppx  - S/p acyclovir for shingles prophylaxis s/p rituximab per chart (started on 4/23, stopped 10/27)     CLAD: PTA azithromycin 250 mg three times/week (for QTc of 460) --> hold while on ciprofloxacin, resume once course complete, Breo (substitute for Advair), and montelukast.     H/o recurrent CMV viremia: Last level (10/25) negative.  Per OP notes, pt. had been on chronic valcyte, but this was stopped secondary to cost.  - CMV weekly monitoring x4 given recent stress dose steroids (ordered, next 11/1)       Hypogammaglobulinemia: IgG low although adequate at 552 on 10/27, no indication for IVIG at this time.     Other relevant problems being managed by the primary team:     Probable urosepsis:  Prostatic enhancement see on CT: Admitted with sepsis and encephalopathy as above.  CT CAP findings as above.  Urine culture (10/24) with E. coli and Klebsiella  pneumoniae.  Urine culture (10/25) negative.  Transplant ID consulted, see their note for details.   - ABX: PO ciprofloxacin (10/29-11/7) for total 14d course per transplant ID (signed off 10/29), s/p additional ceftriaxone (10/27-10/28), minocycline (10/26-10/28, h/o Steno), Zosyn (10/25-10/26), doxycycline (10/25-10/26), and IV vancomycin (10/25-10/27)     Pancytopenia: Possibly secondary to infection, improvement noted on CBC 10/28.  - Management per primary    Medically Ready for Discharge: Anticipated in 2-4 Days      We appreciate the excellent care provided by the [place] [id number] team.  Recommendations communicated via [employer] and this note.  Will continue to follow along closely, please do not hesitate to call with any questions or concerns.     Patient discussed with Dr. Cristina.    Ayse Lovell, APRN, CNP   Inpatient Nurse Practitioner  Pulmonary CF/Transplant     Subjective & Interval History:     Weaned to RA today at rest during visit.  Breathing is comfortable.  Minimally productive cough, no change for the past 2 weeks. Good appetite and PO. Seen by SLP yesterday and passed for regular/thin liquids diet. Loose stools persist, 3-4 daily, on imodium.  No abdominal pain or nausea. Denies dysuria.     Review of Systems:     C: No fever, no chills, no change in weight  INTEGUMENTARY/SKIN: No rash or obvious new lesions  ENT/MOUTH: No sore throat, no sinus pain, no nasal congestion or drainage  RESP: See interval history  CV: No chest pain, no palpitations, no peripheral edema, no orthopnea  GI:  no reflux symptoms  : No dysuria  MUSCULOSKELETAL: + left knee pain   ENDOCRINE: Blood sugars with adequate control  NEURO: No headache  PSYCHIATRIC: Mood stable    Physical Exam:     All notes, images, and labs from past 24 hours (at minimum) were reviewed.    Vital signs:  Temp: 97.8  F (36.6  C) Temp src: Oral BP: 129/75 Pulse: 90   Resp: 18 SpO2: 97 % O2 Device: Nasal cannula with humidification Oxygen  Delivery: 3 LPM   Weight: 64.1 kg (141 lb 5 oz)  I/O:   Intake/Output Summary (Last 24 hours) at 10/30/2024 1206  Last data filed at 10/30/2024 0900  Gross per 24 hour   Intake 1040 ml   Output 3050 ml   Net -2010 ml     Constitutional: sitting in bed, in no apparent distress.   HEENT: Eyes with pink conjunctivae, anicteric.  Oral mucosa moist without lesions.   PULM: Mildly diminished air flow bases bilaterally.  No crackles, no rhonchi, no wheezes.  Non-labored breathing on RA.  CV: Normal S1 and S2.  RRR.  No murmur, gallop, or rub.  No peripheral edema.   ABD: NABS, soft, nontender, nondistended.    MSK: Moves all extremities.  No apparent muscle wasting.   NEURO: Alert and conversant.   SKIN: Warm, dry.  No rash on limited exam.   PSYCH: Mood stable.     Data:     LABS    CMP:   Recent Labs   Lab 10/30/24  1156 10/30/24  0912 10/30/24  0439 10/30/24  0145 10/29/24  2220 10/29/24  1945 10/29/24  0806 10/29/24  0546 10/28/24  2140 10/28/24  2050 10/28/24  0755 10/28/24  0417 10/27/24  0916 10/27/24  0534 10/26/24  1829 10/26/24  1753 10/26/24  0728 10/26/24  0533 10/25/24  1859 10/25/24  1652 10/25/24  0119 10/25/24  0024   NA  --   --  143  --   --  139  --  141  --  144  --  146*  --  140  --  138  --  140  --  135   < > 134*   POTASSIUM  --   --  4.0  --   --  3.7  --  3.5  --  3.4  3.4  --  3.0*  --  3.7  --  3.2*  --  3.8  --  4.8  4.8   < > 4.4   CHLORIDE  --   --  106  --   --  102  --  106  --  108*  --  110*  --  106  --  102  --  104  --  103   < > 103   CO2  --   --  27  --   --  27  --  27  --  25  --  22  --  23  --  21*  --  21*  --  17*   < > 17*   ANIONGAP  --   --  10  --   --  10  --  8  --  11  --  14  --  11  --  15  --  15  --  15   < > 14   GLC 82 104* 110* 106*   < > 82   < > 100*   < > 110*   < > 45*   < > 150*   < > 151*   < > 198*   < > 119*   < > 78   BUN  --   --  27.6*  --   --  28.1*  --  28.4*  --  31.4*  --  36.7*  --  36.3*  --  30.0*  --  22.1  --  23.1*   < > 30.1*   CR  --    --  0.69  --   --  0.73  --  0.74  --  0.86  --  0.93  --  1.00  --  1.02  --  0.79  --  0.92   < > 0.82   GFRESTIMATED  --   --  >90  --   --  >90  --  >90  --  >90  --  88  --  80  --  79  --  >90  --  89   < > >90   ERIN  --   --  7.8*  --   --  7.7*  --  7.5*  --  7.4*  --  7.6*  --  7.4*  --  7.5*  --  7.6*  --  7.6*   < > 7.7*   MAG  --   --  1.9  --   --   --   --  1.7  --   --   --  2.3  --  2.6*  --  2.7*   < > 1.4*  --   --   --  1.5*   PHOS  --   --   --   --   --   --   --   --   --   --   --   --   --  3.3  --   --   --  3.7  --   --   --  2.9   PROTTOTAL  --   --   --   --   --   --   --   --   --   --   --   --   --  4.8*  --  4.8*  --  4.5*  --  4.7*   < > 5.1*   ALBUMIN  --   --   --   --   --   --   --   --   --   --   --   --   --  2.7*  --  2.4*  --  2.2*  --  2.2*   < > 2.5*   BILITOTAL  --   --   --   --   --   --   --   --   --   --   --   --   --  0.5  --  0.4  --  0.4  --  0.5   < > 0.5   ALKPHOS  --   --   --   --   --   --   --   --   --   --   --   --   --  110  --  117  --  104  --  113   < > 138   AST  --   --   --   --   --   --   --   --   --   --   --   --   --  45  --  47*  --  47*  --  48*   < > 38   ALT  --   --   --   --   --   --   --   --   --   --   --   --   --  24  --  29  --  33  --  34   < > 43    < > = values in this interval not displayed.     CBC:   Recent Labs   Lab 10/30/24  0439 10/28/24  0913 10/27/24  0534 10/26/24  2344   WBC 6.4 7.1 3.8* 3.9*   RBC 3.05* 3.43* 2.65* 2.72*   HGB 9.4* 10.6* 8.2* 8.4*   HCT 30.2* 35.5* 26.5* 27.1*   MCV 99 104* 100 100   MCH 30.8 30.9 30.9 30.9   MCHC 31.1* 29.9* 30.9* 31.0*   RDW 16.4* 16.6* 16.4* 16.4*    182 146* 141*       INR:   Recent Labs   Lab 10/27/24  0534   INR 1.59*       Glucose:   Recent Labs   Lab 10/30/24  1156 10/30/24  0912 10/30/24  0439 10/30/24  0145 10/29/24  2220 10/29/24  1945   GLC 82 104* 110* 106* 88 82       Blood Gas:   Recent Labs   Lab 10/27/24  0533 10/26/24  0533 10/25/24  2344   PHV 7.47*  "7.46* 7.41   PCO2V 36* 33* 33*   PO2V 70* 48* 90*   HCO3V 26 24 21   CAMPBELL 2.5 0.2 -3.1*   O2PER 2 0 28       Culture Data No results for input(s): \"CULT\" in the last 168 hours.    Virology Data:   Lab Results   Component Value Date    INFLUA Negative 01/28/2014    FLUAH1 Not Detected 10/26/2024    FLUAH3 Not Detected 10/26/2024    YV0145 Not Detected 10/26/2024    IFLUB Not Detected 10/26/2024    RSVA Not Detected 10/26/2024    RSVB Not Detected 10/26/2024    PIV1 Not Detected 10/26/2024    PIV2 Not Detected 10/26/2024    PIV3 Not Detected 10/26/2024    HMPV Not Detected 10/26/2024    HRVS Negative 02/24/2019    ADVBE Negative 02/24/2019    ADVC Negative 02/24/2019    ADVC Negative 08/05/2014    ADVC Negative 06/03/2014       Historical CMV results (last 3 of prior testing):  Lab Results   Component Value Date    CMVQNT Not Detected 10/25/2024    CMVQNT Not Detected 07/12/2024    CMVQNT Not Detected 05/16/2024     Lab Results   Component Value Date    CMVLOG Not Calculated 06/03/2021    CMVLOG Not Calculated 05/12/2021    CMVLOG Not Calculated 02/19/2020       Urine Studies    Recent Labs   Lab Test 10/25/24  1250 08/29/24  1522   URINEPH 5.0 5.0   NITRITE Negative Negative   LEUKEST Trace* Negative   WBCU 12* 1       Most Recent [place] Pulmonary Function Testing (FVC/FEV1 only)  FVC-Pre   Date Value Ref Range Status   05/16/2024 3.69 L    11/17/2023 3.67 L    05/25/2023 3.74 L    11/17/2022 3.68 L      FVC-%Pred-Pre   Date Value Ref Range Status   05/16/2024 101 %    11/17/2023 100 %    05/25/2023 93 %    11/17/2022 91 %      FEV1-Pre   Date Value Ref Range Status   05/16/2024 3.03 L    11/17/2023 2.93 L    05/25/2023 2.94 L    11/17/2022 2.80 L      FEV1-%Pred-Pre   Date Value Ref Range Status   05/16/2024 108 %    11/17/2023 104 %    05/25/2023 96 %    11/17/2022 91 %        IMAGING    Recent Results (from the past 48 hours)   Echo Complete   Result Value    LVEF  35-40% (moderately reduced)    Narrative    " [id number]  [id number]  [id number]  [id number]^PAUL^ZACHARIAH^ISABELLA     [facility]  Echocardiography Laboratory  [address] [place] [address]     Name: YARED HAMLIN  MRN: [id number]  : 1955  Study Date: 10/28/2024 01:56 PM  Age: 69 yrs  Gender: Male  Patient Location: [facility]  Reason For Study: Dyspnea  Ordering Physician: ZACHARIAH MILLER  Referring Physician: KATE PHILIPPE  Performed By: Carmelita Walsh     BSA: 1.8 m2  Height: 67 in  Weight: 143 lb  HR: 105  BP: 159/97 mmHg  ______________________________________________________________________________  Procedure  Complete Portable Echo Adult. Contrast Optison. Optison (NDC #7024-9735-34)  given intravenously. Patient was given 6 ml mixture of 3 ml Optison and 6 ml  saline. 3 ml wasted. The patient's rhythm is atrial fibrillation.  ______________________________________________________________________________  Interpretation Summary  Left ventricular function is moderately reduced. Biplane EF is 38%.  Global right ventricular function is normal.  No significant valvular abnormalities present.  The inferior vena cava was normal in size with preserved respiratory  variability.  Compared to the prior study on 2024, the LV function is now moderately  reduced.  ______________________________________________________________________________  Left Ventricle  Left ventricular size is normal. Left ventricular wall thickness is normal.  Thickening of the anterobasal septum is present. Left ventricular function is  decreased. The ejection fraction is 35-40% (moderately reduced). Grade I or  early diastolic dysfunction. Moderate diffuse hypokinesis is present. Regional  wall motion assessment limited by irregular rhythm.     Right Ventricle  The right ventricle is normal size. Global right ventricular function is  normal.     Atria  Both atria appear normal. The atrial septum is intact as assessed by color  Doppler .      Mitral Valve  Trace mitral insufficiency is present.     Aortic Valve  The aortic valve is tricuspid. No aortic regurgitation is present. No aortic  stenosis is present.     Tricuspid Valve  Trace tricuspid insufficiency is present. The right ventricular systolic  pressure is 30mmHg above the right atrial pressure. Pulmonary artery systolic  pressure is normal.     Pulmonic Valve  The pulmonic valve is normal.     Vessels  The aortic root is mildly dilated to 3.7 cm. The ascending aorta is mildly  dilated to 3.7 cm. The inferior vena cava was normal in size with preserved  respiratory variability. The pulmonary artery cannot be assessed.     Pericardium  No pericardial effusion is present.     Miscellaneous  No significant valvular abnormalities present.     Compared to Previous Study  Compared to the prior study on 2024, the LV function is now moderately  reduced.     Attestation  I have personally viewed the imaging and agree with the interpretation and  report as documented by the fellow, Cornelius Chowdhury, and/or edited by me.  ______________________________________________________________________________  MMode/2D Measurements & Calculations  IVSd: 1.2 cm  LVIDd: 4.5 cm  LVIDs: 4.0 cm  LVPWd: 0.92 cm  FS: 11.6 %  LV mass(C)d: 162.3 grams  LV mass(C)dI: 92.6 grams/m2  Ao root diam: 3.7 cm  asc Aorta Diam: 3.7 cm  LVOT diam: 2.4 cm  LVOT area: 4.6 cm2     EF(MOD-bp): 38.4 %  Ao root diam index Ht(cm/m): 2.2  Ao root diam index BSA (cm/m2): 2.1  Asc Ao diam index BSA (cm/m2): 2.1     Asc Ao diam index Ht(cm/m): 2.2  RWT: 0.41  TAPSE: 1.5 cm     Doppler Measurements & Calculations  MV E max roberto: 39.8 cm/sec  MV A max roberto: 67.3 cm/sec  MV E/A: 0.59  PA acc time: 0.09 sec  TR max roberto: 272.7 cm/sec  TR max P.8 mmHg  E/E' av.0  Lateral E/e': 6.8  Medial E/e': 7.2  RV S Roberto: 8.9 cm/sec     ______________________________________________________________________________  Report approved by: Rach Contreras  10/28/2024 03:09 PM

## 2024-10-30 NOTE — PROGRESS NOTES
[facility]    Progress Note - Medicine Service, MAROON TEAM 1       Date of Admission:  10/26/2024    Assessment & Plan   Aubrey Duncan is a 69 year old male. He has CAD, DLBCL of esophagus (s/p rituximab), HLD, DM2, CKD 3b, Bilateral lung transplant 2/2 COPD and Alpha-1-antitrypsin deficiency in 2013, GERD, Anemia, and recent BKA who presents for sepsis. Pt initially presented to [facility] in [place] for lethargy on 10/24/24 transferred to our facility on 10/26 for further workup of shock. By the time of arrival at [facility], shock and encephalopathy had resolved. Transplant pulmonology and transplant ID were consulted and patient is receiving treatment for UTI as well as AHRF.     Today:  - Cardiology following  - Transplant Pulmonology following   - Respiratory culture with oral contaminant  - Bcx finalized with no growth  - Plan for angiogram, likely Monday. Hold Apixaban 48 hours before and bridge with heparin  - Introducing GDMT, will start with Spironolactone 12.5mg tomorrow  - Increased Tacrolimus today to 1.5mg BID  - Additional IV Lasix 40mg. Goal NN 2L  - Continue Ciprofloxacin through 11/7  - Decrease breakfast aspart to 3u and lantus to 6u    Acute hypoxic respiratory failure likely 2/2 HFrEf as below  H/o bilateral lung transplant 2/2 COPD and Alpha-1-antitrypsin deficiency  Transplanted in 2013 for A1AT deficency. 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 Has not had Tacrolimus level checked in >1 month prior to hospitalization. On check was found to be significantly elevated at 39.8. [place]ro level downtrending. SOB improved with DuoNebs and Lasix.   - Wean O2 to maintain O2 sat of >92%.   - Transplant Pulmonology following  - Continue BID Prednisone (5 and 2.5mg), Dapsone, Montelukast  - Tacrolimus 1.5mg BID (goal 8-10)   - DuoNeb  PRN  - Continue Pulmonary toilet  - Diuresis as below    New HFrEF (EF 35-40%)  EF of 55-60% on 1/11/24. Echo from this admission on 10/28/24 showing new EF of 35-40%, moderate diffuse hypokinesis. Difficult to assess WMA due to Afib. No significant valvular abnormalities. EKG on 10/25 without ST changes. Low concern for thyroid dysfunction, PE, ACS, as the cause of this acute presentation. Might be Tacrolimus induced cardiomyopathy vs sepsis?  - Continue PTA 20mg PO Lasix  - 40mg IV Lasix  - Goal NN 2L  - 2g NA and 2L fluid restriction  - Recommended that he not use the table salt that he brought from home  - GDMT   - ACEi/ARB: Would hold off at this point due to interaction with Tacrolimus. Discuss outpt   - MRA: Spironolactone 12.5mg PO   - BB: Initiate outpt   - SGLT2i: Initiate outpt  - Cardiology following      Sepsis c/b suspected septic shock   Initially presented on 10/24 septic with fever, tachycardia, leukopenia, and elevated lactate. UA showing pyuria and bacteruria, urine cx not obtained. CT C/A/P showing R upper and middle lobe ill defined nodules concerning for infectious/inflammatory etiology. No abdominal focus of infection. Procalcitonin uptrending from 16 to 37. Negative Bcx and Urine cx. Negative strep pneumo, legionella, COVID, Flu, RSV, MRSA. Developed shock on AM of 10/25 with further course as below. 10/27 urine culture from OSH growing >100K/CFU e coli.   - Histo, B-D glucan, Aspergillus are negative  - Fungal antibodies pending  - DSA and IgG pending  - continue Ciprofloxacin through 11/7 for 14 day course  - Transplant ID consulted.      Septic shock vs Adrenal crisis (resolved)  Became significantly hypotensive AM of 10/25. Thought to be 2/2 septic shock vs acute adrenal insufficiency. Received 4.7L of IVF. Did not require pressors. Was given stress dose steroids with 100mg Hydrocortisone followed by 50mg q6h. Lactate peaked at 7.2 and has been downtrending. On arrival to this hospital BP  was stable at 137/82, Lactate 4.2, and he appeared clinically well. Lactate, Procalcitonin, and CRP downtrending.   - Completed stress dose steroids  - If hypotensive again can restart Hydrocortisone 50mg and consider gentle IVF  - stop fludracortisone: seems he was on this mostly for hyperkalemia rather than adrenal insufficiency     Pancytopenia - Resolved  h/o normocytic anemia  New pancytopenia with this presentation on top of existing anemia. Baseline Hgb 9-10. Improving since onset. On admission WBC 3.9, Hgb 9.2, and Plt 147. Differential includes marrow suppression from acute infection, DIC, medication effect, and malignancy. Fibrinogen normal. D-dimer mildly elevated at 1.19. Given improvement on abx this was most likely due to infection.   - CTM     h/o Bilateral BKA 2/2 severe PAD and limb ischemia  S/p bilateral BKA (left 9/25 and right 8/28). Noted some wound dehiscence and granulation tissue on L stump over last few weeks but not a significant amount of drainage or purulence.   - WOC consult     Difficult vascular access  Pt has had difficulties with PICC placement by vascular access team. Also has difficulties with PIVs, they flush and draw appropriately however he still develops significant edema in his arms when medications are infusing, as well as severe bruising. Discussed with IR and recommended line (if ultimately needed) would be single lumen cuffed low flow catheter tunneled line into the internal jugular vein. But would not be recommended in the setting of CKD and preservation for possible future dialysis.     History of HIIT  History of DVT/PE  - PTA Apixaban 5mg BID  - Plan to hold Apixaban on 11/2 and start heparin gtt for angiogram planning     History of CMV Viremia  Last level (10/25) negative. Per OP notes, pt. had been on chronic valcyte, but this was stopped secondary to cost.   - CMV monitoring weekly for 4 weeks given stress dose steroids (next 11/1)    Reported H/o CKD3b  Recent Cr  and eGFR have been in the normal range. Baseline Cr 0.9-1.0.  - CTM      Steroid induced DM  A1c of 4.2% on 8/25/24. Home dose of Lantus 18u in morning and Novolog 5u with breakfast and 3u with lunch/dinner. Given his very low A1c and episodes of asymptomatic hypoglycemia in the hospital on his home doses adjustments have been made.   -- lantus decreased from 10u to 6u in AM  - Decrease to [id number] with breakfast  - Continue [id number] with lunch/dinner  - MDSSI  - BS check before meals and in morning  - Hypoglycemia protocol      Hypocalcemia  Hypomagnesemia  Hypermagnesemia  Hypokalemia  Vit D normal at 23. PTH mildly elevated at 99.  - CTM and correct hypomagnesemia  - CTM calcium, may provide oral replacement once magnesium is corrected  - replete to K>3.5     GERD  - PTA Pantoprazole 40mg     HTN  - Holding Coreg 6.25 BID, resumeon 10/29 if remains HDS on 10/28     CAD  Follows with cardiology, last seen 8/2024. CAD s/p PCI with MEGHNA x 1 to LAD (1/11/24) and PCI with MEGHNA x 1 to RCA (2/16/24). Managed PTA with DAPT (ASA 81 mg and clopidogrel 75 mg). He is to be on DAPT x 1 year followed by lifelong ASA 81 mg monotherapy.   - Continue ASA  - Continue Rosuvastatin  - Cardiology consulted     History of diffuse large b-cell lymphoma of esophagus  - diagnosed after GI bleed in 3/24.  Started on weekly rituximab x 4 doses in 4/2024.  Restaging PET after this showed good response with decreased hypermetabolism in distal duodenum.    - held off on further rituximab given plan for vascular surgery for non-healing foot ulcers.    - follows with Dr. Drake ([facility] oncology) and perhaps with a local oncologist as well.  Plan was for repeat PET scan in 3 months, which would be this month but I don't see this scheduled.  Recommend follow-up with oncology on discharge.          Diet: Snacks/Supplements Adult: [employer] Bar; Between Meals  Fluid restriction 2000 ML FLUID  2 Gram Sodium Diet    DVT Prophylaxis: DOAC  Naranjo Catheter:  Not present  Fluids: None  Lines: 1 PIV     Cardiac Monitoring: None  Code Status: Full Code      Clinically Significant Risk Factors          # Hyperchloremia: Highest Cl = 108 mmol/L in last 2 days, will monitor as appropriate          # Hypoalbuminemia: Lowest albumin = 2.2 g/dL at 10/26/2024  5:33 AM, will monitor as appropriate       # Hypertension: Noted on problem list  # Acute heart failure with reduced ejection fraction: last echo with EF <40% and receiving IV diuretics              # Financial/Environmental Concerns: none         Disposition Plan      Expected Discharge Date: 11/07/2024      Destination: inpatient rehabilitation facility  Discharge Comments: treating for sepsis, unclear time frame, likely needs TCU as he came from TCU      The patient's care was discussed with the Attending Physician, Dr. Rhonda Sutherland MD  Medicine Service, [facility] TEAM [address] [place] [address]  Securely message with conXt (more info)  Text page via [employer] Paging/Directory   See signed in provider for up to date coverage information  ______________________________________________________________________    Interval History   NAEO. Again turned his O2 off while seeing him this morning and he maintained O2 sat >90% during our conversation.     Breathing feels the same as yesterday. Continuing to have morning dry cough without sputum production. Not excited about the sodium restriction to his diet but feels ok with the meal options that are available. Notes that he has some tingling at the tip of his penis when starting to urinate but this has been present for several months since a traumatic catheterization, denies any other urinary sx. Diarrhea is unchanged and at his baseline, using Imodium daily. Denies chest pain, dizziness, f/c/s, abd pain, and n/v.     Physical Exam   Vital Signs: Temp: 97.9  F (36.6  C) Temp src: Oral BP: 128/77 Pulse: 87   Resp: 17 SpO2: 91 % O2 Device:  None (Room air) Oxygen Delivery: 3 LPM  Weight: 141 lbs 5.04 oz    General: Alert. Sitting in bed comfortably. NAD. Well developed and nourished.  HEENT: Clear conjunctivae, normal oropharynx  Cardiovascular: RRR, No murmurs, rubs, or gallops. Normal S1 and S2.  Pulmonary: On RA. Continues to have crackles in bilateral bases. Normal inspiratory effort.   Abdominal: non-distended and non-tender  Extremities: Bilateral BKA. R stump well healed with no skin breakdown. L stump with pictures as in media tab.   Skin: Scattered bruising to BUE.   Neuro: awake and answering all questions appropriately, no focal deficits      Medical Decision Making           Data     I have personally reviewed the following data over the past 24 hrs:    6.4  \   9.4 (L)   / 171     143 106 27.6 (H) /  225 (H)   4.0 27 0.69 \     Trop: N/A BNP: N/A     Procal: N/A CRP: N/A Lactic Acid: 1.9         Imaging results reviewed over the past 24 hrs:   No results found for this or any previous visit (from the past 24 hours).

## 2024-10-31 ENCOUNTER — APPOINTMENT (OUTPATIENT)
Dept: PHYSICAL THERAPY | Facility: CLINIC | Age: 69
DRG: 871 | End: 2024-10-31
Attending: STUDENT IN AN ORGANIZED HEALTH CARE EDUCATION/TRAINING PROGRAM
Payer: MEDICARE

## 2024-10-31 LAB
ANION GAP SERPL CALCULATED.3IONS-SCNC: 10 MMOL/L (ref 7–15)
ANION GAP SERPL CALCULATED.3IONS-SCNC: 10 MMOL/L (ref 7–15)
BUN SERPL-MCNC: 30.9 MG/DL (ref 8–23)
BUN SERPL-MCNC: 32.6 MG/DL (ref 8–23)
CALCIUM SERPL-MCNC: 8 MG/DL (ref 8.8–10.4)
CALCIUM SERPL-MCNC: 8.1 MG/DL (ref 8.8–10.4)
CHLORIDE SERPL-SCNC: 101 MMOL/L (ref 98–107)
CHLORIDE SERPL-SCNC: 105 MMOL/L (ref 98–107)
CREAT SERPL-MCNC: 0.81 MG/DL (ref 0.67–1.17)
CREAT SERPL-MCNC: 0.97 MG/DL (ref 0.67–1.17)
CRP SERPL-MCNC: 44.1 MG/L
CYSTATIN C (ROCHE): 1.4 MG/L (ref 0.6–1)
EGFRCR SERPLBLD CKD-EPI 2021: 83 ML/MIN/1.73M2
EGFRCR SERPLBLD CKD-EPI 2021: >90 ML/MIN/1.73M2
ERYTHROCYTE [DISTWIDTH] IN BLOOD BY AUTOMATED COUNT: 16.4 % (ref 10–15)
GFR/BSA.PRED SERPLBLD CYS-BASED-ARV: 48 ML/MIN/1.73M2
GLUCOSE BLDC GLUCOMTR-MCNC: 110 MG/DL (ref 70–99)
GLUCOSE BLDC GLUCOMTR-MCNC: 136 MG/DL (ref 70–99)
GLUCOSE BLDC GLUCOMTR-MCNC: 142 MG/DL (ref 70–99)
GLUCOSE BLDC GLUCOMTR-MCNC: 81 MG/DL (ref 70–99)
GLUCOSE BLDC GLUCOMTR-MCNC: 88 MG/DL (ref 70–99)
GLUCOSE SERPL-MCNC: 150 MG/DL (ref 70–99)
GLUCOSE SERPL-MCNC: 90 MG/DL (ref 70–99)
HCO3 SERPL-SCNC: 26 MMOL/L (ref 22–29)
HCO3 SERPL-SCNC: 26 MMOL/L (ref 22–29)
HCT VFR BLD AUTO: 29.1 % (ref 40–53)
HGB BLD-MCNC: 9 G/DL (ref 13.3–17.7)
MAGNESIUM SERPL-MCNC: 1.5 MG/DL (ref 1.7–2.3)
MAGNESIUM SERPL-MCNC: 2.2 MG/DL (ref 1.7–2.3)
MCH RBC QN AUTO: 30.7 PG (ref 26.5–33)
MCHC RBC AUTO-ENTMCNC: 30.9 G/DL (ref 31.5–36.5)
MCV RBC AUTO: 99 FL (ref 78–100)
PLATELET # BLD AUTO: 186 10E3/UL (ref 150–450)
POTASSIUM SERPL-SCNC: 3.7 MMOL/L (ref 3.4–5.3)
POTASSIUM SERPL-SCNC: 4.5 MMOL/L (ref 3.4–5.3)
RBC # BLD AUTO: 2.93 10E6/UL (ref 4.4–5.9)
SCANNED LAB RESULT: NORMAL
SODIUM SERPL-SCNC: 137 MMOL/L (ref 135–145)
SODIUM SERPL-SCNC: 141 MMOL/L (ref 135–145)
TACROLIMUS BLD-MCNC: 7.1 UG/L (ref 5–15)
TME LAST DOSE: NORMAL H
TME LAST DOSE: NORMAL H
WBC # BLD AUTO: 9 10E3/UL (ref 4–11)

## 2024-10-31 PROCEDURE — 99233 SBSQ HOSP IP/OBS HIGH 50: CPT | Mod: 24 | Performed by: PHYSICIAN ASSISTANT

## 2024-10-31 PROCEDURE — 86140 C-REACTIVE PROTEIN: CPT | Performed by: STUDENT IN AN ORGANIZED HEALTH CARE EDUCATION/TRAINING PROGRAM

## 2024-10-31 PROCEDURE — 250N000013 HC RX MED GY IP 250 OP 250 PS 637

## 2024-10-31 PROCEDURE — 94640 AIRWAY INHALATION TREATMENT: CPT | Mod: 76

## 2024-10-31 PROCEDURE — 250N000009 HC RX 250: Performed by: STUDENT IN AN ORGANIZED HEALTH CARE EDUCATION/TRAINING PROGRAM

## 2024-10-31 PROCEDURE — 83735 ASSAY OF MAGNESIUM: CPT

## 2024-10-31 PROCEDURE — 82565 ASSAY OF CREATININE: CPT

## 2024-10-31 PROCEDURE — 99233 SBSQ HOSP IP/OBS HIGH 50: CPT | Mod: GC | Performed by: STUDENT IN AN ORGANIZED HEALTH CARE EDUCATION/TRAINING PROGRAM

## 2024-10-31 PROCEDURE — 250N000011 HC RX IP 250 OP 636

## 2024-10-31 PROCEDURE — 80197 ASSAY OF TACROLIMUS: CPT | Performed by: NURSE PRACTITIONER

## 2024-10-31 PROCEDURE — 36415 COLL VENOUS BLD VENIPUNCTURE: CPT

## 2024-10-31 PROCEDURE — 97110 THERAPEUTIC EXERCISES: CPT | Mod: GP

## 2024-10-31 PROCEDURE — 36415 COLL VENOUS BLD VENIPUNCTURE: CPT | Performed by: NURSE PRACTITIONER

## 2024-10-31 PROCEDURE — 82610 CYSTATIN C: CPT

## 2024-10-31 PROCEDURE — 120N000003 HC R&B IMCU UMMC

## 2024-10-31 PROCEDURE — 97542 WHEELCHAIR MNGMENT TRAINING: CPT | Mod: GP

## 2024-10-31 PROCEDURE — 250N000012 HC RX MED GY IP 250 OP 636 PS 637: Performed by: NURSE PRACTITIONER

## 2024-10-31 PROCEDURE — 94799 UNLISTED PULMONARY SVC/PX: CPT

## 2024-10-31 PROCEDURE — 97530 THERAPEUTIC ACTIVITIES: CPT | Mod: GP

## 2024-10-31 PROCEDURE — 250N000013 HC RX MED GY IP 250 OP 250 PS 637: Performed by: STUDENT IN AN ORGANIZED HEALTH CARE EDUCATION/TRAINING PROGRAM

## 2024-10-31 PROCEDURE — 85018 HEMOGLOBIN: CPT

## 2024-10-31 PROCEDURE — 80048 BASIC METABOLIC PNL TOTAL CA: CPT

## 2024-10-31 PROCEDURE — 36415 COLL VENOUS BLD VENIPUNCTURE: CPT | Performed by: STUDENT IN AN ORGANIZED HEALTH CARE EDUCATION/TRAINING PROGRAM

## 2024-10-31 PROCEDURE — 999N000157 HC STATISTIC RCP TIME EA 10 MIN

## 2024-10-31 PROCEDURE — 93010 ELECTROCARDIOGRAM REPORT: CPT | Performed by: INTERNAL MEDICINE

## 2024-10-31 PROCEDURE — 250N000012 HC RX MED GY IP 250 OP 636 PS 637

## 2024-10-31 RX ORDER — MAGNESIUM OXIDE 400 MG/1
800 TABLET ORAL DAILY
Status: DISCONTINUED | OUTPATIENT
Start: 2024-10-31 | End: 2024-11-05 | Stop reason: HOSPADM

## 2024-10-31 RX ORDER — FUROSEMIDE 10 MG/ML
40 INJECTION INTRAMUSCULAR; INTRAVENOUS ONCE
Status: DISCONTINUED | OUTPATIENT
Start: 2024-10-31 | End: 2024-10-31

## 2024-10-31 RX ORDER — MAGNESIUM SULFATE HEPTAHYDRATE 40 MG/ML
4 INJECTION, SOLUTION INTRAVENOUS ONCE
Status: COMPLETED | OUTPATIENT
Start: 2024-10-31 | End: 2024-10-31

## 2024-10-31 RX ADMIN — PANTOPRAZOLE SODIUM 40 MG: 40 TABLET, DELAYED RELEASE ORAL at 08:01

## 2024-10-31 RX ADMIN — IPRATROPIUM BROMIDE AND ALBUTEROL SULFATE 3 ML: .5; 3 SOLUTION RESPIRATORY (INHALATION) at 12:28

## 2024-10-31 RX ADMIN — PREDNISONE 2.5 MG: 2.5 TABLET ORAL at 20:20

## 2024-10-31 RX ADMIN — DAPSONE 50 MG: 25 TABLET ORAL at 08:01

## 2024-10-31 RX ADMIN — PREDNISONE 5 MG: 5 TABLET ORAL at 07:56

## 2024-10-31 RX ADMIN — CARVEDILOL 6.25 MG: 6.25 TABLET, FILM COATED ORAL at 08:01

## 2024-10-31 RX ADMIN — INSULIN GLARGINE 6 UNITS: 100 INJECTION, SOLUTION SUBCUTANEOUS at 08:05

## 2024-10-31 RX ADMIN — TACROLIMUS 1.5 MG: 1 CAPSULE ORAL at 08:01

## 2024-10-31 RX ADMIN — MAGNESIUM SULFATE IN WATER 4 G: 40 INJECTION, SOLUTION INTRAVENOUS at 08:23

## 2024-10-31 RX ADMIN — LOPERAMIDE HYDROCHLORIDE 2 MG: 2 CAPSULE ORAL at 17:51

## 2024-10-31 RX ADMIN — LOPERAMIDE HYDROCHLORIDE 2 MG: 2 CAPSULE ORAL at 22:46

## 2024-10-31 RX ADMIN — Medication 1 TABLET: at 12:46

## 2024-10-31 RX ADMIN — MAGNESIUM OXIDE TAB 400 MG (241.3 MG ELEMENTAL MG) 800 MG: 400 (241.3 MG) TAB at 22:41

## 2024-10-31 RX ADMIN — CARVEDILOL 6.25 MG: 6.25 TABLET, FILM COATED ORAL at 17:49

## 2024-10-31 RX ADMIN — ASPIRIN 81 MG: 81 TABLET ORAL at 08:01

## 2024-10-31 RX ADMIN — FUROSEMIDE 20 MG: 20 TABLET ORAL at 09:28

## 2024-10-31 RX ADMIN — Medication 2 CAPSULE: at 08:01

## 2024-10-31 RX ADMIN — Medication 60 ML: at 09:59

## 2024-10-31 RX ADMIN — VENLAFAXINE HYDROCHLORIDE 37.5 MG: 37.5 CAPSULE, EXTENDED RELEASE ORAL at 08:01

## 2024-10-31 RX ADMIN — CIPROFLOXACIN HYDROCHLORIDE 500 MG: 250 TABLET, FILM COATED ORAL at 08:01

## 2024-10-31 RX ADMIN — TACROLIMUS 1.5 MG: 1 CAPSULE ORAL at 17:49

## 2024-10-31 RX ADMIN — IPRATROPIUM BROMIDE AND ALBUTEROL SULFATE 3 ML: .5; 3 SOLUTION RESPIRATORY (INHALATION) at 08:19

## 2024-10-31 RX ADMIN — Medication 2 CAPSULE: at 20:21

## 2024-10-31 RX ADMIN — FLUTICASONE FUROATE AND VILANTEROL TRIFENATATE 1 PUFF: 200; 25 POWDER RESPIRATORY (INHALATION) at 08:07

## 2024-10-31 RX ADMIN — SPIRONOLACTONE 12.5 MG: 25 TABLET, FILM COATED ORAL at 08:04

## 2024-10-31 RX ADMIN — MONTELUKAST 10 MG: 10 TABLET, FILM COATED ORAL at 20:20

## 2024-10-31 RX ADMIN — CIPROFLOXACIN HYDROCHLORIDE 500 MG: 250 TABLET, FILM COATED ORAL at 20:21

## 2024-10-31 RX ADMIN — IPRATROPIUM BROMIDE AND ALBUTEROL SULFATE 3 ML: .5; 3 SOLUTION RESPIRATORY (INHALATION) at 16:40

## 2024-10-31 RX ADMIN — ACETAMINOPHEN 650 MG: 325 TABLET, FILM COATED ORAL at 22:46

## 2024-10-31 RX ADMIN — LOPERAMIDE HYDROCHLORIDE 4 MG: 2 CAPSULE ORAL at 08:01

## 2024-10-31 RX ADMIN — ACETAMINOPHEN 650 MG: 325 TABLET, FILM COATED ORAL at 17:51

## 2024-10-31 RX ADMIN — IPRATROPIUM BROMIDE AND ALBUTEROL SULFATE 3 ML: .5; 3 SOLUTION RESPIRATORY (INHALATION) at 20:34

## 2024-10-31 NOTE — PROGRESS NOTES
10/30/24 1400   Appointment Info   Signing Clinician's Name / Credentials (PT) Lexa Cook DPT   Rehab Comments (PT) s/p bilateral BKA (left 9/25 and right 8/28)   Living Environment   People in Home spouse   Current Living Arrangements house   Home Accessibility stairs to enter home   Number of Stairs, Main Entrance 2   Stair Railings, Main Entrance none   Transportation Anticipated family or friend will provide   Living Environment Comments Pt lives in a house with his wife with 2 small BRIEN, wife plans to bump up patient in w/c or place plank of wood over small step. All needs can be met on the main level. Pt was recently at TCU prior to current hospital admission, was close to being ready to d/c home.   Self-Care   Usual Activity Tolerance moderate   Current Activity Tolerance moderate   Regular Exercise Yes   Activity/Exercise Type other (see comments)  (PT/OT at TCU)   Exercise Amount/Frequency daily;1 hr   Equipment Currently Used at Home hospital bed;walker, rolling;wheelchair, manual  (bed pan)   Fall history within last six months no   Activity/Exercise/Self-Care Comment Pt was progressing well at TCU with slide board transfers 2/2 recent bilateral BKAs. per pt and wife, pt has all AE needed at home, was planning to d/c to home from TCU, plans for RLE prosthetic fitting soon.   General Information   Onset of Illness/Injury or Date of Surgery 10/26/24   Referring Physician Jonny Stuherland MD   Patient/Family Therapy Goals Statement (PT) return to TCU before going home   Pertinent History of Current Problem (include personal factors and/or comorbidities that impact the POC) Aubrey Duncan is a 69 year old male. He has CAD, DLBCL of esophagus (s/p rituximab), HLD, DM2, CKD 3b, Bilateral lung transplant 2/2 COPD and Alpha-1-antitrypsin deficiency in 2013, GERD, Anemia, and recent BKA who presents for sepsis. Pt initially presented to [facility] site in [place] for lethargy on 10/24/24 transferred to  our facility on 10/26 for further workup of shock.   Existing Precautions/Restrictions fall   Weight-Bearing Status - LLE nonweight-bearing   Weight-Bearing Status - RLE nonweight-bearing   Cognition   Affect/Mental Status (Cognition) WFL   Orientation Status (Cognition) oriented x 4   Follows Commands (Cognition) WFL   Pain Assessment   Patient Currently in Pain Yes, see Vital Sign flowsheet  (L stump surgical site)   Integumentary/Edema   Integumentary/Edema Comments surgical incision and edemaL stump   Range of Motion (ROM)   ROM Comment BLE WFL   Strength (Manual Muscle Testing)   Strength Comments generalized BLE weakness, at least 3/5 in BLE   Bed Mobility   Comment, (Bed Mobility) supine>sit HOB elevated, bedrail and SBA   Transfers   Comment, (Transfers) slideboard transfer bed>w/c with CGA   Balance   Balance Comments good static sitting balance, SBA at EOB   Sensory Examination   Sensory Perception patient reports no sensory changes   Sensory Perception Comments some phantom limb sensations in LLE   Clinical Impression   Criteria for Skilled Therapeutic Intervention Yes, treatment indicated   PT Diagnosis (PT) impaired functional mobility   Influenced by the following impairments strength, activity tolerance, recent bilateral BKAs   Functional limitations due to impairments transfers, wheelchair mobility   Clinical Presentation (PT Evaluation Complexity) stable   Clinical Presentation Rationale PMH/comorbidities, clinical judgement   Clinical Decision Making (Complexity) low complexity   Planned Therapy Interventions (PT) balance training;bed mobility training;home exercise program;neuromuscular re-education;patient/family education;strengthening;ROM (range of motion);transfer training;wheelchair management/propulsion training   Risk & Benefits of therapy have been explained evaluation/treatment results reviewed;care plan/treatment goals reviewed;participants voiced agreement with care plan;participants  included;patient;spouse/significant other   PT Total Evaluation Time   PT Eval, Low Complexity Minutes (46582) 10   Physical Therapy Goals   PT Frequency 5x/week   PT Predicted Duration/Target Date for Goal Attainment 11/08/24   PT: Bed Mobility Modified independent;Supine to/from sit   PT: Transfers Supervision/stand-by assist;Bed to/from chair;Assistive device  (sllide board)   PT: Wheelchair Mobility Caregiver SBA;manual wheelchair;Greater than 500 feet   Therapeutic Activity   Therapeutic Activities: dynamic activities to improve functional performance Minutes (12880) 25   Symptoms Noted During/After Treatment Fatigue   Treatment Detail/Skilled Intervention Patient greeted supine, motivated for therapy, wife present and very supportive. Increased time for room set up in order to facilitate safe mobility and transfers. See eval above for bed mobility and initial slideboard transfer. Patient dons comprlession garments IND while sitting upright. Scoots to EOB with SBA, reports feeling weaker and transfers more fatiguing since hospital admission. Pt and wife report they were nearing d/c home from TCU, was about to be fitted for RLE prosthesis then likely going to d/c home from TCU, most difficulty noted with car transfers, but was able to perform with wife at TCU. Discussed d/c options home vs TCU, pt and wife hopeful to return back to TCU where they have a bed hold prior to return home. Discussed having wife bring wheelchair and slide board from home to hospital to simulate home environment as much as possible. Following w/c mobility, patient completed slide board transfer w/c>bed with CGA and slide board. Patient left supine in bed, all needs met, VSS on RA. Encouraged LE therex and ROM especially prolonged knee extension stretches to prevent contracture.   Wheelchair Managment/Training   Wheelchair Mgmt/Training Minutes (57436) 10   Symptoms Noted During/After Treatment Fatigue   Treatment Detail/Skilled  Intervention To increase activity tolerance and functional independence, patient engaged in w/c propulsion ~150' and SBA, small propulsions and UE fatigue due to deconditioning as well as w/c too wide for patient's frame. Obtained more narrow w/c for future session and wife plans to bring patient's w/c from home.   PT Discharge Planning   PT Plan slideboard transfers bed<>w/c,  w/c mobility, LE therex, knee extension stretch/ROM   PT Discharge Recommendation (DC Rec) Transitional Care Facility   PT Rationale for DC Rec Patient below baseline mobillity, will benefit from return to TCU to optimize functional status prior to return home.   PT Brief overview of current status slide board transfer bed<>w/c CGA   Physical Therapy Time and Intention   Timed Code Treatment Minutes 35   Total Session Time (sum of timed and untimed services) 45

## 2024-10-31 NOTE — PLAN OF CARE
Goal Outcome Evaluation:                    Neuro: A&Ox4.   Cardiac: SR. VSS.    Respiratory: Sating <95% on 2L NC.  GI/: Adequate urine output.   Diet/appetite: Tolerating regular diet. Eating well.  Activity:  Assist of 2, not out of bed.  Pain: At acceptable level on current regimen.   Skin: No new deficits noted. R arm still weeping fluid, both arms are still swollen. Bottom still red from loose stools.  LDA's: RPIV, SL.     Plan: Continue with POC. Notify primary team with changes.

## 2024-10-31 NOTE — PROGRESS NOTES
Resident/Fellow Attestation   I, Jonny Sutherland MD, was present with the medical/MEETA student who participated in the service and in the documentation of the note.  I have verified the history and personally performed the physical exam and medical decision making.  I agree with the assessment and plan of care as documented in the note.      Jonny Sutherland MD  PGY1  Date of Service (when I saw the patient): 10/31/24    [facility]    Progress Note - Medicine Service, MAROON TEAM 1       Date of Admission:  10/26/2024    Assessment & Plan   Aubrey Duncan is a 69 year old male. He has CAD, DLBCL of esophagus (s/p rituximab), HLD, DM2, CKD 3b, Bilateral lung transplant 2/2 COPD and Alpha-1-antitrypsin deficiency in 2013, GERD, Anemia, and recent BKA who presents for sepsis. Pt initially presented to [facility] in [place] for lethargy on 10/24/24 transferred to our facility on 10/26 for further workup of shock. By the time of arrival at [facility], shock and encephalopathy had resolved. Transplant pulmonology and transplant ID were consulted and patient is receiving treatment for UTI as well as AHRF. Plan for coronary angiogram tomorrow.     Today:  - Cardiology following  - Transplant Pulmonology following   - Transplant ID following   - Replacing Magnesium with 4g IV  - Plan for angiogram on 11/1. Apixaban held on 10/31, no need to bridge as history of DVT's is remote  - NPO at MN   - Hold Insulin tomorrow (11/1) for angiogram  - Introducing GDMT, Spironolactone 12.5mg today  - Continue Tacrolimus 1.5mg BID  - Converted from Magnesium Gluconate to Magnesium oxide   - PO 20 mg Lasix.   - Continue Ciprofloxacin through 11/7   - Daily EKG while on Cipro with Qtc of 460-480. Avoid additional QT prolonging meds  - Continue Lantus 6U AM, Novolog 3 U at meals today, will decrease in the future.      Acute hypoxic respiratory failure likely 2/2 HFrEf as below  H/o bilateral lung  transplant 2/2 COPD and Alpha-1-antitrypsin deficiency  Complex Sleep Apnea  Transplanted in 2013 for A1AT deficency. 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 Has not had Tacrolimus level checked in >1 month prior to hospitalization. On check was found to be significantly elevated at 39.8. Now in normal range, transplant pulm titrating to goal. Requires 2L NC intermittently (mostly at night), was not on any O2 at home or TCU. Noted to have Complex Sleep Apnea in 2014, was recommended to have further investigation and an adaptive sero-ventilation device but did not pursue this.   - Wean O2 to maintain O2 sat of >92%. Overnight Oximetry study 24-48h before discharge  - Transplant Pulmonology following  - Continue BID Prednisone (5 and 2.5mg), Dapsone, Montelukast  - Tacrolimus 1.5mg BID (goal 8-10)   - DuoNeb PRN  - Continue Pulmonary toilet  - Diuresis as below     New HFrEF (EF 35-40%)  EF of 55-60% on 1/11/24. Echo from this admission on 10/28/24 showing new EF of 35-40%, moderate diffuse hypokinesis. Difficult to assess WMA due to Afib. No significant valvular abnormalities. EKG on 10/25 without ST changes. Low concern for thyroid dysfunction, PE, ACS, as the cause of this acute presentation. Might be Tacrolimus induced cardiomyopathy vs sepsis  - Coronary angiogram 11/1 to evaluate for obstructive CAD as cause of cardiomyopathy  - Continue PTA 20mg PO Lasix  - Goal NN 2L  - 2g NA and 2L fluid restriction  - Recommended that he not use the table salt that he brought from home  - GDMT              - ACEi/ARB: Would hold off at this point due to interaction with Tacrolimus. Discuss outpt              - MRA: Spironolactone 12.5mg PO              - BB: PTA Carvedilol 6.25 mg BID              - SGLT2i: Initiate outpt  - Cardiology following     Sepsis c/b suspected septic shock   Initially presented on  10/24 septic with fever, tachycardia, leukopenia, and elevated lactate. UA showing pyuria and bacteruria, urine cx not obtained. CT C/A/P showing R upper and middle lobe ill defined nodules concerning for infectious/inflammatory etiology. No abdominal focus of infection. Procalcitonin uptrending from 16 to 37. Negative Bcx and Urine cx. Negative strep pneumo, legionella, COVID, Flu, RSV, MRSA. Developed shock on AM of 10/25 with further course as below. 10/27 urine culture from OSH growing >100K/CFU e coli.   - Histo, B-D glucan, Aspergillus are negative  - Fungal antibodies pending  - DSA and IgG pending  - continue Ciprofloxacin through 11/7 for 14 day course  - Transplant ID consulted.      Septic shock vs Adrenal crisis (resolved)  Became significantly hypotensive AM of 10/25. Thought to be 2/2 septic shock vs acute adrenal insufficiency. Received 4.7L of IVF. Did not require pressors. Was given stress dose steroids with 100mg Hydrocortisone followed by 50mg q6h. Lactate peaked at 7.2 and has been downtrending. On arrival to this hospital BP was stable at 137/82, Lactate 4.2, and he appeared clinically well. Lactate, Procalcitonin, and CRP downtrending.   - Completed stress dose steroids  - If hypotensive again can restart Hydrocortisone 50mg and consider gentle IVF  - stop fludracortisone: seems he was on this mostly for hyperkalemia rather than adrenal insufficiency     Pancytopenia - Resolved  h/o normocytic anemia  New pancytopenia with this presentation on top of existing anemia. Baseline Hgb 9-10. Improving since onset. On admission WBC 3.9, Hgb 9.2, and Plt 147. Differential includes marrow suppression from acute infection, DIC, medication effect, and malignancy. Fibrinogen normal. D-dimer mildly elevated at 1.19. Given improvement on abx this was most likely due to infection.   - CTM     h/o Bilateral BKA 2/2 severe PAD and limb ischemia  S/p bilateral BKA (left 9/25 and right 8/28). Noted some wound  dehiscence and granulation tissue on L stump over last few weeks but not a significant amount of drainage or purulence.   - WOC consult     Difficult vascular access  Pt has had difficulties with PICC placement by vascular access team. Also has difficulties with PIVs, they flush and draw appropriately however he still develops significant edema in his arms when medications are infusing, as well as severe bruising. Discussed with IR and recommended line (if ultimately needed) would be single lumen cuffed low flow catheter tunneled line into the internal jugular vein. But would not be recommended in the setting of CKD and preservation for possible future dialysis.      History of HIIT  History of DVT/PE  - PTA Apixaban 5mg BID  - Plan to hold Apixaban on 10/31 for coronary angiogram on 11/1     History of CMV Viremia  Last level (10/25) negative. Per OP notes, pt. had been on chronic valcyte, but this was stopped secondary to cost.   - CMV monitoring weekly for 4 weeks given stress dose steroids (next 11/1)     Reported H/o CKD3b   Recent Cr and eGFR have been in the normal range after bilateral BKA. Current Baseline Cr 0.9-1.0. Unknown baseline prior. Ordering cystatin C for further evaluation. Would interpret these results with the understanding that it may be elevated by high CRP, decreased albumin, and prednisone use.   - Cystatin C  - CTM      Steroid induced DM  A1c of 4.2% on 8/25/24. Home dose of Lantus 18u in morning and Novolog 5u with breakfast and 3u with lunch/dinner. Given his very low A1c and episodes of asymptomatic hypoglycemia in the hospital on his home doses adjustments have been made. Blood sugars on 10/31 have been in the 90's-110 range. Will continue current regimen today and hold insulin tomorrow before coronary angiogram   - Lantus 6u in AM  - [id number] with breakfast/lunch/dinner  - MDSSI  - BS check before meals and in morning  - Hypoglycemia protocol       Hypocalcemia  Hypomagnesemia  Hypermagnesemia  Hypokalemia  Vit D normal at 23 and PTH mildly elevated at 99 on 10/27/24  - CTM and correct hypomagnesemia  - CTM calcium, may provide oral replacement once magnesium is corrected  - replete to K>3.5     GERD  - PTA Pantoprazole 40mg     HTN   - Resumed Coreg 6.25 BID,     CAD  Follows with cardiology, last seen 8/2024. CAD s/p PCI with MEGHNA x 1 to LAD (1/11/24) and PCI with MEGHNA x 1 to RCA (2/16/24). Managed PTA with DAPT (ASA 81 mg and clopidogrel 75 mg). He is to be on DAPT x 1 year followed by lifelong ASA 81 mg monotherapy.   - Continue ASA  - Continue Rosuvastatin  - Cardiology consulted     History of diffuse large b-cell lymphoma of esophagus  - diagnosed after GI bleed in 3/24.  Started on weekly rituximab x 4 doses in 4/2024.  Restaging PET after this showed good response with decreased hypermetabolism in distal duodenum.    - held off on further rituximab given plan for vascular surgery for non-healing foot ulcers.    - follows with Dr. Drake ([facility] oncology) and perhaps with a local oncologist as well.  Plan was for repeat PET scan in 3 months, which would be this month but I don't see this scheduled.  Recommend follow-up with oncology on discharge.             Diet: Snacks/Supplements Adult: Special K Bar; Between Meals  Fluid restriction 2000 ML FLUID  2 Gram Sodium Diet  NPO per Anesthesia Guidelines for Procedure/Surgery Except for: Meds    DVT Prophylaxis: Held for coronary angiogram on 11/1  Naranjo Catheter: Not present  Fluids: None  Lines: None     Cardiac Monitoring: None  Code Status: Full Code      Clinically Significant Risk Factors             # Hypomagnesemia: Lowest Mg = 1.5 mg/dL in last 2 days, will replace as needed   # Hypoalbuminemia: Lowest albumin = 2.2 g/dL at 10/26/2024  5:33 AM, will monitor as appropriate     # Hypertension: Noted on problem list  # Acute heart failure with reduced ejection fraction: last echo with EF <40% and receiving  IV diuretics              # Financial/Environmental Concerns: none         Disposition Plan      Expected Discharge Date: 11/07/2024      Destination: inpatient rehabilitation facility  Discharge Comments: treating for sepsis, unclear time frame, likely needs TCU as he came from TCU        The patient's care was discussed with the Attending Physician, Dr. Ellis .    Teri Rojas, MS3  Medical Student  Medicine Service, [facility] TEAM 1  [facility]  Securely message with [employer] (more info)  Text page via [employer] Paging/Directory   See signed in provider for up to date coverage information  ______________________________________________________________________    Interval History   NAEON. Reported continued diarrhea which is unchanged and shortness of breath which is also unchanged. Denied nausea, vomiting, cough, fever, chills.     Physical Exam   Vital Signs: Temp: 97.4  F (36.3  C) Temp src: Oral BP: (!) 151/98 Pulse: 100   Resp: 24 SpO2: 95 % O2 Device: None (Room air) Oxygen Delivery: 2 LPM  Weight: 141 lbs 5.04 oz    Constitutional: Alert. Sitting in bed comfortably. NAD. Well developed and nourished   Respiratory: On RA. Continues to have crackles in bilateral bases. Normal inspiratory effort.   Cardiovascular: RRR, No murmurs, rubs, or gallops. Normal S1 and S2.   Musculoskeletal: BKA with dressings on both stumps. Dressings appear CDI.   Skin: scattered bruising to BUE, no other bruises, rashes, lesions, no jaundice.     Medical Decision Making       Please see A&P for additional details of medical decision making.      Data   Most Recent 3 CBC's:  Recent Labs   Lab Test 10/31/24  0416 10/30/24  0439 10/28/24  0913   WBC 9.0 6.4 7.1   HGB 9.0* 9.4* 10.6*   MCV 99 99 104*    171 182     Most Recent 3 BMP's:  Recent Labs   Lab Test 10/31/24  1323 10/31/24  0920 10/31/24  0425 10/31/24  0416 10/30/24  0912 10/30/24  0439 10/29/24  2220 10/29/24  1945   NA   --   --   --  141  --  143  --  139   POTASSIUM  --   --   --  3.7  --  4.0  --  3.7   CHLORIDE  --   --   --  105  --  106  --  102   CO2  --   --   --  26  --  27  --  27   BUN  --   --   --  32.6*  --  27.6*  --  28.1*   CR  --   --   --  0.97  --  0.69  --  0.73   ANIONGAP  --   --   --  10  --  10  --  10   ERIN  --   --   --  8.0*  --  7.8*  --  7.7*   * 81 88 90   < > 110*   < > 82    < > = values in this interval not displayed.     Most Recent 2 LFT's:  Recent Labs   Lab Test 10/27/24  0534 10/26/24  1753   AST 45 47*   ALT 24 29   ALKPHOS 110 117   BILITOTAL 0.5 0.4     Most Recent 3 Creatinines:  Recent Labs   Lab Test 10/31/24  0416 10/30/24  0439 10/29/24  1945   CR 0.97 0.69 0.73     Most Recent 3 Hemoglobins:  Recent Labs   Lab Test 10/31/24  0416 10/30/24  0439 10/28/24  0913   HGB 9.0* 9.4* 10.6*     Most Recent 6 Bacteria Isolates From Any Culture (See EPIC Reports for Culture Details):  Recent Labs   Lab Test 07/06/21  1101 11/27/20  1430 10/16/20  0939 09/11/20  0818 07/16/20  0856 07/02/20  1042   CULT Heavy growth  Klebsiella pneumoniae  *  Heavy growth  Pseudomonas aeruginosa  * Moderate growth  Pseudomonas aeruginosa  * Heavy growth  Pseudomonas aeruginosa  Some antibiotics have been suppressed due to the known inducible beta lactamase activity.   Please contact Microbiology for more information.  * Heavy growth  Escherichia coli  *  Heavy growth  Enterococcus faecalis  * Moderate growth  Stenotrophomonas maltophilia  * No growth after 6 days  No growth after 6 days     Most Recent 6 glucoses:  Recent Labs   Lab Test 10/31/24  1323 10/31/24  0920 10/31/24  0425 10/31/24  0416 10/30/24  2254 10/30/24  1739   * 81 88 90 94 225*     Most Recent ESR & CRP:  Recent Labs   Lab Test 10/31/24  0135 10/25/24  0024 08/25/24  1632 07/02/20  1042   SED  --   --  52* 16*   CRP  --   --   --  0.4   CRPI 44.10*   < > 82.30*  --     < > = values in this interval not displayed.     Tacrolimus  by Tandem Mass Spectrometry: 7.1

## 2024-10-31 NOTE — PROGRESS NOTES
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  Recently s/p right BKA in August and left BKA on 24 and has been at [facility] since surgery in September.  Increased lethargy and confusion 10/24, seen in the [facility] ER with concern for pneumonia and UTI s/p IV fluids and ceftriaxone, transferred to [facility] for sepsis, started on broad spectrum ABX, IV fluids, and stress dose steroids.  The patient was transferred to [facility] on 10/26 for further work up of sepsis and encephalopathy, possible UTI as source.  Also with supratherapeutic tacrolimus level.  Anticipate discharge back to TCU once medically ready.     Today's recommendations:   - Bacterial sputum culture ordered if able to recollect (oral contamination x2)  - Consider serial imaging for the lung nodules per transplant ID.  Of note, pt. is due for repeat PET for interval follow up PTLD of duodenum, recommend follow-up with oncology on discharge.  - Diuresis per primary  - Plan for coronary angiogram possibly as soon as  per cardiology  - Wean supplemental oxygen to maintain SpO2 >92%, may need overnight oximetry study 24-48h prior to discharge home  - DSA (10/27) pending  - Tacrolimus level to trend remains subtherapeutic, defer dose adjustment given recent increase, daily levels ordered through   - Chronic azithromycin held while on  ciprofloxacin, resume once course complete  - CMV weekly monitoring x4 given recent stress dose steroids (ordered, next 11/1)    - PO ciprofloxacin through 11/7 for total 14d course for UTI per transplant ID, repeat EKG for QTc monitoring  - Consider BUE US if swelling persists     Acute hypoxic respiratory failure:  Concern for RUL/RML pneumonia:  Encephalopathy, Resolved: Admitted from OSH with sepsis and encephalopathy, likely UTI as source with possible pneumonia.  Also with supratherapeutic tacrolimus level possibly contributing to encephalopathy, now improved.  CT CAP (10/25) noted interval development of new hazy ill-defined pulmonary nodule involving RUL and RML, interval decrease in prior consolidation posterior RLL with minimal residual atelectasis remaining, minimal atelectasis LLL with stable chronic left basilar pleural fluid, no new lymphadenopathy, unchanged pancreatic cyst, and heterogeneous enhancement to a moderately enlarged prostate.  Tmax 102.8.  CRP, procal, and LA elevated, all now improving.  S/p stress dose steroids (stopped 10/27).  MRSA swab, covid/influenza/RSV, Legionella/Strep pneumoniae (10/25) all negative, respiratory panel (10/26) negative.  Fungitell, A. galactomannan, Fungal Ab, and Histo blood Ag (10/25) all negative.  Histo galactomannan Ag urine (10/26) negative.  Suspect hypoxia in part due to hypervolemia, CXR (10/27) with increased size of cardiac silhouette and increased pulmonary edema.  Repeat CXR (10/28) with decreased pulmonary edema with mild residual edematous and/or atelectatic changes and increased small left pleural effusion.  Echo (10/28) with EF 38%, normal RV, no significant valvular abnormalities.  Cardiology consulted 10/30 given echo findings and cardiac history, see their note for details.  Remains on RA-2L NC (no oxygen use at baseline).    - Bacterial sputum culture ordered if able to recollect (oral contamination x2)  - Consider serial imaging for the  lung nodules per transplant ID.  Of note, pt. is due for repeat PET for interval follow up PTLD of duodenum, recommend follow-up with oncology on discharge.  - ABX as below per transplant ID  - Diuresis per primary  - Plan for coronary angiogram possibly as soon as 11/1 per cardiology  - Nebs: Duoneb QID  - IS and Aerobika q1-2h while awake   - Wean supplemental oxygen to maintain SpO2 >92%, may need overnight oximetry study 24-48h prior to discharge home    S/p bilateral sequential lung transplant (BSLT) for A1AT: DSA negative 11/17/23.  Last seen by Dr. Murphy in May and at that time PFTs had improved to recent best (in two years).  EBV (10/27) negative.    - DSA (10/27) pending    Immunosuppression:  - Tacrolimus 1.5 mg BID (increased 10/30).  Goal level 8-10.  Level to trend 10/31 remains subtherapeutic at 7.1, defer dose adjust given recent increase, daily levels ordered through 11/2.  - Prednisone 5 mg qAM / 2.5 mg qPM     Prophylaxis:   - Dapsone for PJP ppx  - S/p acyclovir for shingles prophylaxis s/p rituximab per chart (started on 4/23, stopped 10/27)     CLAD: PTA azithromycin 250 mg three times/week (for QTc of 460) --> held while on ciprofloxacin, resume once course complete, Breo (substitute for Advair), and montelukast.     H/o recurrent CMV viremia: Last level (10/25) negative.  Per OP notes, pt. had been on chronic valcyte, but this was stopped secondary to cost.  - CMV weekly monitoring x4 given recent stress dose steroids (ordered, next 11/1)       Hypogammaglobulinemia: IgG low although adequate at 552 on 10/27, no indication for IVIG at this time.     Other relevant problems being managed by the primary team:     Probable urosepsis:  Prostatic enhancement see on CT: Admitted with sepsis and encephalopathy as above.  CT CAP findings as above.  Urine culture (10/24) with E. coli and Klebsiella pneumoniae.  Urine culture (10/25) negative.  Transplant ID consulted, see their note for details.   -  ABX: PO ciprofloxacin (10/29-11/7) for total 14d course per transplant ID, (signed off 10/29) --> repeat EKG for QTc monitoring, s/p additional ceftriaxone (10/27-10/28), minocycline (10/26-10/28, h/o Steno), Zosyn (10/25-10/26), doxycycline (10/25-10/26), and IV vancomycin (10/25-10/27)    Pancytopenia: Possibly secondary to infection, improvement noted on CBC 10/28.  - Management per primary    We appreciate the excellent care provided by the [place] [address] team.  Recommendations communicated via [email or web address] and this note.  Will continue to follow along closely, please do not hesitate to call with any questions or concerns.    Patient discussed with Dr. Cristina.    Delma Henry PA-C  Inpatient [facility]  Pulmonary CF/Transplant     Subjective & Interval History:     On RA during the day with 2L NC overnight, breathing feels comfortable.  Denies cough/sputum.  Net negative 1.9L with diuresis yesterday.  Left leg pain managed.  Loose stools persist, using Imodium.    Review of Systems:     C: No fever, no chills, no change in weight, no change in appetite  INTEGUMENTARY/SKIN: No rash or obvious new lesions  ENT/MOUTH: No sore throat, no sinus pain, no nasal congestion or drainage  RESP: See interval history  CV: No chest pain, + peripheral edema  GI: No nausea, no vomiting, no reflux symptoms  : + external catheter  MUSCULOSKELETAL: See interval history  ENDOCRINE: Blood sugars with adequate control  NEURO: No headache  PSYCHIATRIC: Mood stable    Physical Exam:     All notes, images, and labs from past 24 hours (at minimum) were reviewed.    Vital signs:  Temp: 98.4  F (36.9  C) Temp src: Oral BP: 131/72 Pulse: 100   Resp: 22 SpO2: 94 % O2 Device: None (Room air) Oxygen Delivery: 2 LPM   Weight: 64.1 kg (141 lb 5 oz)  I/O:   Intake/Output Summary (Last 24 hours) at 10/31/2024 1611  Last data filed at 10/31/2024 1544  Gross per 24 hour   Intake 598 ml   Output 2185 ml   Net -1587 ml     Constitutional: Sitting up in bed,  in no apparent distress.   HEENT: Eyes with pink conjunctivae, anicteric.  Oral mucosa moist without lesions.   PULM: Mildly diminished L>RLL air flow.  No crackles, no rhonchi, no wheezes.  Non-labored breathing on 2L NC.  CV: Normal S1 and S2.  RRR.  No murmur, gallop, or rub.  + L>RUE edema.   ABD: NABS, soft, nondistended.    MSK: No apparent muscle wasting.   NEURO: Alert and conversant.   SKIN: Warm, dry.  No rash on limited exam.   PSYCH: Mood stable.     Data:     LABS    CMP:   Recent Labs   Lab 10/31/24  1323 10/31/24  0920 10/31/24  0425 10/31/24  0416 10/30/24  0912 10/30/24  0439 10/29/24  2220 10/29/24  1945 10/29/24  0806 10/29/24  0546 10/28/24  0755 10/28/24  0417 10/27/24  0916 10/27/24  0534 10/26/24  1829 10/26/24  1753 10/26/24  0728 10/26/24  0533 10/25/24  1859 10/25/24  1652 10/25/24  0119 10/25/24  0024   NA  --   --   --  141  --  143  --  139  --  141   < > 146*  --  140  --  138  --  140  --  135   < > 134*   POTASSIUM  --   --   --  3.7  --  4.0  --  3.7  --  3.5   < > 3.0*  --  3.7  --  3.2*  --  3.8  --  4.8  4.8   < > 4.4   CHLORIDE  --   --   --  105  --  106  --  102  --  106   < > 110*  --  106  --  102  --  104  --  103   < > 103   CO2  --   --   --  26  --  27  --  27  --  27   < > 22  --  23  --  21*  --  21*  --  17*   < > 17*   ANIONGAP  --   --   --  10  --  10  --  10  --  8   < > 14  --  11  --  15  --  15  --  15   < > 14   * 81 88 90   < > 110*   < > 82   < > 100*   < > 45*   < > 150*   < > 151*   < > 198*   < > 119*   < > 78   BUN  --   --   --  32.6*  --  27.6*  --  28.1*  --  28.4*   < > 36.7*  --  36.3*  --  30.0*  --  22.1  --  23.1*   < > 30.1*   CR  --   --   --  0.97  --  0.69  --  0.73  --  0.74   < > 0.93  --  1.00  --  1.02  --  0.79  --  0.92   < > 0.82   GFRESTIMATED  --   --   --  83  --  >90  --  >90  --  >90   < > 88  --  80  --  79  --  >90  --  89   < > >90   ERIN  --   --   --  8.0*  --  7.8*  --  7.7*  --  7.5*   < > 7.6*  --  7.4*  --  7.5*  --  " 7.6*  --  7.6*   < > 7.7*   MAG  --   --   --  1.5*  --  1.9  --   --   --  1.7  --  2.3  --  2.6*  --  2.7*   < > 1.4*  --   --   --  1.5*   PHOS  --   --   --   --   --   --   --   --   --   --   --   --   --  3.3  --   --   --  3.7  --   --   --  2.9   PROTTOTAL  --   --   --   --   --   --   --   --   --   --   --   --   --  4.8*  --  4.8*  --  4.5*  --  4.7*   < > 5.1*   ALBUMIN  --   --   --   --   --   --   --   --   --   --   --   --   --  2.7*  --  2.4*  --  2.2*  --  2.2*   < > 2.5*   BILITOTAL  --   --   --   --   --   --   --   --   --   --   --   --   --  0.5  --  0.4  --  0.4  --  0.5   < > 0.5   ALKPHOS  --   --   --   --   --   --   --   --   --   --   --   --   --  110  --  117  --  104  --  113   < > 138   AST  --   --   --   --   --   --   --   --   --   --   --   --   --  45  --  47*  --  47*  --  48*   < > 38   ALT  --   --   --   --   --   --   --   --   --   --   --   --   --  24  --  29  --  33  --  34   < > 43    < > = values in this interval not displayed.     CBC:   Recent Labs   Lab 10/31/24  0416 10/30/24  3329 10/28/24  4735 10/27/24  8523   WBC 9.0 6.4 7.1 3.8*   RBC 2.93* 3.05* 3.43* 2.65*   HGB 9.0* 9.4* 10.6* 8.2*   HCT 29.1* 30.2* 35.5* 26.5*   MCV 99 99 104* 100   MCH 30.7 30.8 30.9 30.9   MCHC 30.9* 31.1* 29.9* 30.9*   RDW 16.4* 16.4* 16.6* 16.4*    171 182 146*       INR:   Recent Labs   Lab 10/27/24  0534   INR 1.59*       Glucose:   Recent Labs   Lab 10/31/24  1323 10/31/24  0920 10/31/24  0425 10/31/24  0416 10/30/24  2254 10/30/24  1739   * 81 88 90 94 225*       Blood Gas:   Recent Labs   Lab 10/27/24  0533 10/26/24  0533 10/25/24  2344   PHV 7.47* 7.46* 7.41   PCO2V 36* 33* 33*   PO2V 70* 48* 90*   HCO3V 26 24 21   CAMPBELL 2.5 0.2 -3.1*   O2PER 2 0 28       Culture Data No results for input(s): \"CULT\" in the last 168 hours.    Virology Data:   Lab Results   Component Value Date    INFLUA Negative 01/28/2014    FLUAH1 Not Detected 10/26/2024    FLUAH3 Not " Detected 10/26/2024    RV1951 Not Detected 10/26/2024    IFLUB Not Detected 10/26/2024    RSVA Not Detected 10/26/2024    RSVB Not Detected 10/26/2024    PIV1 Not Detected 10/26/2024    PIV2 Not Detected 10/26/2024    PIV3 Not Detected 10/26/2024    HMPV Not Detected 10/26/2024    HRVS Negative 02/24/2019    ADVBE Negative 02/24/2019    ADVC Negative 02/24/2019    ADVC Negative 08/05/2014    ADVC Negative 06/03/2014       Historical CMV results (last 3 of prior testing):  Lab Results   Component Value Date    CMVQNT Not Detected 10/25/2024    CMVQNT Not Detected 07/12/2024    CMVQNT Not Detected 05/16/2024     Lab Results   Component Value Date    CMVLOG Not Calculated 06/03/2021    CMVLOG Not Calculated 05/12/2021    CMVLOG Not Calculated 02/19/2020       Urine Studies    Recent Labs   Lab Test 10/25/24  1250 08/29/24  1522   URINEPH 5.0 5.0   NITRITE Negative Negative   LEUKEST Trace* Negative   WBCU 12* 1       Most Recent [place] Pulmonary Function Testing (FVC/FEV1 only)  FVC-Pre   Date Value Ref Range Status   05/16/2024 3.69 L    11/17/2023 3.67 L    05/25/2023 3.74 L    11/17/2022 3.68 L      FVC-%Pred-Pre   Date Value Ref Range Status   05/16/2024 101 %    11/17/2023 100 %    05/25/2023 93 %    11/17/2022 91 %      FEV1-Pre   Date Value Ref Range Status   05/16/2024 3.03 L    11/17/2023 2.93 L    05/25/2023 2.94 L    11/17/2022 2.80 L      FEV1-%Pred-Pre   Date Value Ref Range Status   05/16/2024 108 %    11/17/2023 104 %    05/25/2023 96 %    11/17/2022 91 %        IMAGING    No results found for this or any previous visit (from the past 48 hours).

## 2024-10-31 NOTE — PROGRESS NOTES
Brief Cardiology Note   10/31/2024    Aubrey Duncan MRN# [id number]   Age: 69 year old YOB: 1955     Updated plan of care from 10/30. May be able to go for angiogram tomorrow. Please hold Eliquis and make NPO @ [facility]. Restart PTA BB when clinically appropriate to do so. Rest of medications as outlined in formal consult note on 10/30.     Billy Haile MD  Cardiology Fellow

## 2024-11-01 ENCOUNTER — TRANSFERRED RECORDS (OUTPATIENT)
Dept: FAMILY MEDICINE | Facility: OTHER | Age: 69
End: 2024-11-01
Payer: MEDICARE

## 2024-11-01 ENCOUNTER — APPOINTMENT (OUTPATIENT)
Dept: PHYSICAL THERAPY | Facility: CLINIC | Age: 69
DRG: 871 | End: 2024-11-01
Attending: STUDENT IN AN ORGANIZED HEALTH CARE EDUCATION/TRAINING PROGRAM
Payer: MEDICARE

## 2024-11-01 VITALS — SYSTOLIC BLOOD PRESSURE: 136 MMHG | DIASTOLIC BLOOD PRESSURE: 77 MMHG

## 2024-11-01 LAB
ANION GAP SERPL CALCULATED.3IONS-SCNC: 8 MMOL/L (ref 7–15)
ANION GAP SERPL CALCULATED.3IONS-SCNC: 9 MMOL/L (ref 7–15)
APTT PPP: 30 SECONDS (ref 22–38)
ATRIAL RATE - MUSE: 96 BPM
BUN SERPL-MCNC: 26.2 MG/DL (ref 8–23)
BUN SERPL-MCNC: 28.1 MG/DL (ref 8–23)
CALCIUM SERPL-MCNC: 8.1 MG/DL (ref 8.8–10.4)
CALCIUM SERPL-MCNC: 8.2 MG/DL (ref 8.8–10.4)
CHLORIDE SERPL-SCNC: 103 MMOL/L (ref 98–107)
CHLORIDE SERPL-SCNC: 104 MMOL/L (ref 98–107)
CMV DNA SPEC NAA+PROBE-ACNC: NOT DETECTED IU/ML
CREAT SERPL-MCNC: 0.64 MG/DL (ref 0.67–1.17)
CREAT SERPL-MCNC: 0.73 MG/DL (ref 0.67–1.17)
DIASTOLIC BLOOD PRESSURE - MUSE: NORMAL MMHG
EGFRCR SERPLBLD CKD-EPI 2021: >90 ML/MIN/1.73M2
EGFRCR SERPLBLD CKD-EPI 2021: >90 ML/MIN/1.73M2
ERYTHROCYTE [DISTWIDTH] IN BLOOD BY AUTOMATED COUNT: 16.8 % (ref 10–15)
ERYTHROCYTE [DISTWIDTH] IN BLOOD BY AUTOMATED COUNT: 17 % (ref 10–15)
GLUCOSE BLDC GLUCOMTR-MCNC: 118 MG/DL (ref 70–99)
GLUCOSE BLDC GLUCOMTR-MCNC: 119 MG/DL (ref 70–99)
GLUCOSE BLDC GLUCOMTR-MCNC: 120 MG/DL (ref 70–99)
GLUCOSE BLDC GLUCOMTR-MCNC: 124 MG/DL (ref 70–99)
GLUCOSE BLDC GLUCOMTR-MCNC: 185 MG/DL (ref 70–99)
GLUCOSE SERPL-MCNC: 114 MG/DL (ref 70–99)
GLUCOSE SERPL-MCNC: 115 MG/DL (ref 70–99)
HCG INTACT+B SERPL-ACNC: <1 MIU/ML
HCO3 SERPL-SCNC: 25 MMOL/L (ref 22–29)
HCO3 SERPL-SCNC: 25 MMOL/L (ref 22–29)
HCT VFR BLD AUTO: 31 % (ref 40–53)
HCT VFR BLD AUTO: 31.2 % (ref 40–53)
HGB BLD-MCNC: 9.6 G/DL (ref 13.3–17.7)
HGB BLD-MCNC: 9.7 G/DL (ref 13.3–17.7)
INR PPP: 1.26 (ref 0.85–1.15)
INTERPRETATION ECG - MUSE: NORMAL
MAGNESIUM SERPL-MCNC: 1.8 MG/DL (ref 1.7–2.3)
MCH RBC QN AUTO: 30.9 PG (ref 26.5–33)
MCH RBC QN AUTO: 31.1 PG (ref 26.5–33)
MCHC RBC AUTO-ENTMCNC: 31 G/DL (ref 31.5–36.5)
MCHC RBC AUTO-ENTMCNC: 31.1 G/DL (ref 31.5–36.5)
MCV RBC AUTO: 100 FL (ref 78–100)
MCV RBC AUTO: 99 FL (ref 78–100)
P AXIS - MUSE: 8 DEGREES
PLATELET # BLD AUTO: 228 10E3/UL (ref 150–450)
PLATELET # BLD AUTO: 238 10E3/UL (ref 150–450)
POTASSIUM SERPL-SCNC: 3.9 MMOL/L (ref 3.4–5.3)
POTASSIUM SERPL-SCNC: 4.4 MMOL/L (ref 3.4–5.3)
PR INTERVAL - MUSE: 156 MS
QRS DURATION - MUSE: 80 MS
QT - MUSE: 414 MS
QTC - MUSE: 523 MS
R AXIS - MUSE: -23 DEGREES
RBC # BLD AUTO: 3.09 10E6/UL (ref 4.4–5.9)
RBC # BLD AUTO: 3.14 10E6/UL (ref 4.4–5.9)
SODIUM SERPL-SCNC: 136 MMOL/L (ref 135–145)
SODIUM SERPL-SCNC: 138 MMOL/L (ref 135–145)
SYSTOLIC BLOOD PRESSURE - MUSE: NORMAL MMHG
T AXIS - MUSE: 152 DEGREES
TACROLIMUS BLD-MCNC: 6.4 UG/L (ref 5–15)
TME LAST DOSE: NORMAL H
TME LAST DOSE: NORMAL H
VENTRICULAR RATE- MUSE: 96 BPM
WBC # BLD AUTO: 8.4 10E3/UL (ref 4–11)
WBC # BLD AUTO: 9.4 10E3/UL (ref 4–11)

## 2024-11-01 PROCEDURE — 250N000013 HC RX MED GY IP 250 OP 250 PS 637: Performed by: STUDENT IN AN ORGANIZED HEALTH CARE EDUCATION/TRAINING PROGRAM

## 2024-11-01 PROCEDURE — 250N000013 HC RX MED GY IP 250 OP 250 PS 637

## 2024-11-01 PROCEDURE — 250N000012 HC RX MED GY IP 250 OP 636 PS 637: Performed by: NURSE PRACTITIONER

## 2024-11-01 PROCEDURE — 250N000011 HC RX IP 250 OP 636: Performed by: INTERNAL MEDICINE

## 2024-11-01 PROCEDURE — 80197 ASSAY OF TACROLIMUS: CPT | Performed by: NURSE PRACTITIONER

## 2024-11-01 PROCEDURE — 272N000001 HC OR GENERAL SUPPLY STERILE: Performed by: INTERNAL MEDICINE

## 2024-11-01 PROCEDURE — 97110 THERAPEUTIC EXERCISES: CPT | Mod: GP

## 2024-11-01 PROCEDURE — 99152 MOD SED SAME PHYS/QHP 5/>YRS: CPT | Performed by: INTERNAL MEDICINE

## 2024-11-01 PROCEDURE — 250N000012 HC RX MED GY IP 250 OP 636 PS 637

## 2024-11-01 PROCEDURE — 85610 PROTHROMBIN TIME: CPT | Performed by: STUDENT IN AN ORGANIZED HEALTH CARE EDUCATION/TRAINING PROGRAM

## 2024-11-01 PROCEDURE — 99232 SBSQ HOSP IP/OBS MODERATE 35: CPT | Mod: GC | Performed by: STUDENT IN AN ORGANIZED HEALTH CARE EDUCATION/TRAINING PROGRAM

## 2024-11-01 PROCEDURE — 999N000157 HC STATISTIC RCP TIME EA 10 MIN

## 2024-11-01 PROCEDURE — 99233 SBSQ HOSP IP/OBS HIGH 50: CPT | Mod: 24 | Performed by: PHYSICIAN ASSISTANT

## 2024-11-01 PROCEDURE — 97542 WHEELCHAIR MNGMENT TRAINING: CPT | Mod: GP

## 2024-11-01 PROCEDURE — 250N000009 HC RX 250: Performed by: STUDENT IN AN ORGANIZED HEALTH CARE EDUCATION/TRAINING PROGRAM

## 2024-11-01 PROCEDURE — 93454 CORONARY ARTERY ANGIO S&I: CPT | Performed by: INTERNAL MEDICINE

## 2024-11-01 PROCEDURE — B2111ZZ FLUOROSCOPY OF MULTIPLE CORONARY ARTERIES USING LOW OSMOLAR CONTRAST: ICD-10-PCS | Performed by: INTERNAL MEDICINE

## 2024-11-01 PROCEDURE — 97530 THERAPEUTIC ACTIVITIES: CPT | Mod: GP

## 2024-11-01 PROCEDURE — 36415 COLL VENOUS BLD VENIPUNCTURE: CPT | Performed by: NURSE PRACTITIONER

## 2024-11-01 PROCEDURE — 94640 AIRWAY INHALATION TREATMENT: CPT | Mod: 76

## 2024-11-01 PROCEDURE — 250N000011 HC RX IP 250 OP 636

## 2024-11-01 PROCEDURE — C1894 INTRO/SHEATH, NON-LASER: HCPCS | Performed by: INTERNAL MEDICINE

## 2024-11-01 PROCEDURE — 80048 BASIC METABOLIC PNL TOTAL CA: CPT

## 2024-11-01 PROCEDURE — 250N000009 HC RX 250: Performed by: INTERNAL MEDICINE

## 2024-11-01 PROCEDURE — 93454 CORONARY ARTERY ANGIO S&I: CPT | Mod: 26 | Performed by: INTERNAL MEDICINE

## 2024-11-01 PROCEDURE — 36415 COLL VENOUS BLD VENIPUNCTURE: CPT | Performed by: STUDENT IN AN ORGANIZED HEALTH CARE EDUCATION/TRAINING PROGRAM

## 2024-11-01 PROCEDURE — 99153 MOD SED SAME PHYS/QHP EA: CPT | Performed by: INTERNAL MEDICINE

## 2024-11-01 PROCEDURE — 93005 ELECTROCARDIOGRAM TRACING: CPT

## 2024-11-01 PROCEDURE — 85014 HEMATOCRIT: CPT | Performed by: STUDENT IN AN ORGANIZED HEALTH CARE EDUCATION/TRAINING PROGRAM

## 2024-11-01 PROCEDURE — 86140 C-REACTIVE PROTEIN: CPT | Performed by: STUDENT IN AN ORGANIZED HEALTH CARE EDUCATION/TRAINING PROGRAM

## 2024-11-01 PROCEDURE — 85014 HEMATOCRIT: CPT

## 2024-11-01 PROCEDURE — 83735 ASSAY OF MAGNESIUM: CPT

## 2024-11-01 PROCEDURE — 80048 BASIC METABOLIC PNL TOTAL CA: CPT | Performed by: STUDENT IN AN ORGANIZED HEALTH CARE EDUCATION/TRAINING PROGRAM

## 2024-11-01 PROCEDURE — 120N000003 HC R&B IMCU UMMC

## 2024-11-01 PROCEDURE — 84702 CHORIONIC GONADOTROPIN TEST: CPT | Performed by: STUDENT IN AN ORGANIZED HEALTH CARE EDUCATION/TRAINING PROGRAM

## 2024-11-01 PROCEDURE — 85041 AUTOMATED RBC COUNT: CPT

## 2024-11-01 PROCEDURE — 85730 THROMBOPLASTIN TIME PARTIAL: CPT | Performed by: STUDENT IN AN ORGANIZED HEALTH CARE EDUCATION/TRAINING PROGRAM

## 2024-11-01 PROCEDURE — 99152 MOD SED SAME PHYS/QHP 5/>YRS: CPT | Mod: GC | Performed by: INTERNAL MEDICINE

## 2024-11-01 RX ORDER — ASPIRIN 325 MG
325 TABLET ORAL ONCE
Status: DISCONTINUED | OUTPATIENT
Start: 2024-11-01 | End: 2024-11-01

## 2024-11-01 RX ORDER — NALOXONE HYDROCHLORIDE 0.4 MG/ML
0.4 INJECTION, SOLUTION INTRAMUSCULAR; INTRAVENOUS; SUBCUTANEOUS
Status: DISCONTINUED | OUTPATIENT
Start: 2024-11-01 | End: 2024-11-02

## 2024-11-01 RX ORDER — LOSARTAN POTASSIUM 25 MG/1
25 TABLET ORAL DAILY
Status: DISCONTINUED | OUTPATIENT
Start: 2024-11-01 | End: 2024-11-02

## 2024-11-01 RX ORDER — IOPAMIDOL 755 MG/ML
INJECTION, SOLUTION INTRAVASCULAR
Status: DISCONTINUED | OUTPATIENT
Start: 2024-11-01 | End: 2024-11-01 | Stop reason: HOSPADM

## 2024-11-01 RX ORDER — ATROPINE SULFATE 0.1 MG/ML
0.5 INJECTION INTRAVENOUS
Status: DISCONTINUED | OUTPATIENT
Start: 2024-11-01 | End: 2024-11-02

## 2024-11-01 RX ORDER — OXYCODONE HYDROCHLORIDE 5 MG/1
5 TABLET ORAL EVERY 4 HOURS PRN
Status: DISCONTINUED | OUTPATIENT
Start: 2024-11-01 | End: 2024-11-02

## 2024-11-01 RX ORDER — NALOXONE HYDROCHLORIDE 0.4 MG/ML
0.2 INJECTION, SOLUTION INTRAMUSCULAR; INTRAVENOUS; SUBCUTANEOUS
Status: DISCONTINUED | OUTPATIENT
Start: 2024-11-01 | End: 2024-11-02

## 2024-11-01 RX ORDER — OXYCODONE HYDROCHLORIDE 10 MG/1
10 TABLET ORAL EVERY 4 HOURS PRN
Status: DISCONTINUED | OUTPATIENT
Start: 2024-11-01 | End: 2024-11-02

## 2024-11-01 RX ORDER — ACETAMINOPHEN 325 MG/1
650 TABLET ORAL EVERY 4 HOURS PRN
Status: DISCONTINUED | OUTPATIENT
Start: 2024-11-01 | End: 2024-11-05 | Stop reason: HOSPADM

## 2024-11-01 RX ORDER — POTASSIUM CHLORIDE 750 MG/1
20 TABLET, EXTENDED RELEASE ORAL
Status: DISCONTINUED | OUTPATIENT
Start: 2024-11-01 | End: 2024-11-01

## 2024-11-01 RX ORDER — FENTANYL CITRATE 50 UG/ML
25 INJECTION, SOLUTION INTRAMUSCULAR; INTRAVENOUS
Status: DISCONTINUED | OUTPATIENT
Start: 2024-11-01 | End: 2024-11-05 | Stop reason: HOSPADM

## 2024-11-01 RX ORDER — SODIUM CHLORIDE 9 MG/ML
75 INJECTION, SOLUTION INTRAVENOUS CONTINUOUS
Status: ACTIVE | OUTPATIENT
Start: 2024-11-01 | End: 2024-11-01

## 2024-11-01 RX ORDER — LIDOCAINE 40 MG/G
CREAM TOPICAL
Status: DISCONTINUED | OUTPATIENT
Start: 2024-11-01 | End: 2024-11-01 | Stop reason: HOSPADM

## 2024-11-01 RX ORDER — FLUMAZENIL 0.1 MG/ML
0.2 INJECTION, SOLUTION INTRAVENOUS
Status: DISCONTINUED | OUTPATIENT
Start: 2024-11-01 | End: 2024-11-02

## 2024-11-01 RX ORDER — SODIUM CHLORIDE 9 MG/ML
INJECTION, SOLUTION INTRAVENOUS CONTINUOUS
Status: DISCONTINUED | OUTPATIENT
Start: 2024-11-01 | End: 2024-11-01 | Stop reason: HOSPADM

## 2024-11-01 RX ORDER — FENTANYL CITRATE 50 UG/ML
INJECTION, SOLUTION INTRAMUSCULAR; INTRAVENOUS
Status: DISCONTINUED | OUTPATIENT
Start: 2024-11-01 | End: 2024-11-01 | Stop reason: HOSPADM

## 2024-11-01 RX ORDER — POTASSIUM CHLORIDE 750 MG/1
40 TABLET, EXTENDED RELEASE ORAL
Status: DISCONTINUED | OUTPATIENT
Start: 2024-11-01 | End: 2024-11-01

## 2024-11-01 RX ORDER — ASPIRIN 81 MG/1
243 TABLET, CHEWABLE ORAL ONCE
Status: COMPLETED | OUTPATIENT
Start: 2024-11-01 | End: 2024-11-01

## 2024-11-01 RX ORDER — MAGNESIUM SULFATE 1 G/100ML
1 INJECTION INTRAVENOUS ONCE
Status: COMPLETED | OUTPATIENT
Start: 2024-11-01 | End: 2024-11-01

## 2024-11-01 RX ADMIN — DAPSONE 50 MG: 25 TABLET ORAL at 10:32

## 2024-11-01 RX ADMIN — MAGNESIUM OXIDE TAB 400 MG (241.3 MG ELEMENTAL MG) 800 MG: 400 (241.3 MG) TAB at 23:27

## 2024-11-01 RX ADMIN — ASPIRIN 81 MG: 81 TABLET ORAL at 10:31

## 2024-11-01 RX ADMIN — VENLAFAXINE HYDROCHLORIDE 37.5 MG: 37.5 CAPSULE, EXTENDED RELEASE ORAL at 10:30

## 2024-11-01 RX ADMIN — CIPROFLOXACIN HYDROCHLORIDE 500 MG: 250 TABLET, FILM COATED ORAL at 10:31

## 2024-11-01 RX ADMIN — MONTELUKAST 10 MG: 10 TABLET, FILM COATED ORAL at 21:07

## 2024-11-01 RX ADMIN — LOSARTAN POTASSIUM 25 MG: 25 TABLET, FILM COATED ORAL at 21:07

## 2024-11-01 RX ADMIN — PANTOPRAZOLE SODIUM 40 MG: 40 TABLET, DELAYED RELEASE ORAL at 10:32

## 2024-11-01 RX ADMIN — FUROSEMIDE 20 MG: 20 TABLET ORAL at 10:32

## 2024-11-01 RX ADMIN — CIPROFLOXACIN HYDROCHLORIDE 500 MG: 250 TABLET, FILM COATED ORAL at 21:09

## 2024-11-01 RX ADMIN — Medication 2 CAPSULE: at 10:31

## 2024-11-01 RX ADMIN — PREDNISONE 2.5 MG: 2.5 TABLET ORAL at 21:07

## 2024-11-01 RX ADMIN — TACROLIMUS 1.5 MG: 1 CAPSULE ORAL at 19:14

## 2024-11-01 RX ADMIN — SPIRONOLACTONE 12.5 MG: 25 TABLET, FILM COATED ORAL at 10:33

## 2024-11-01 RX ADMIN — Medication 2 CAPSULE: at 21:07

## 2024-11-01 RX ADMIN — APIXABAN 5 MG: 5 TABLET, FILM COATED ORAL at 21:07

## 2024-11-01 RX ADMIN — LOPERAMIDE HYDROCHLORIDE 4 MG: 2 CAPSULE ORAL at 10:30

## 2024-11-01 RX ADMIN — IPRATROPIUM BROMIDE AND ALBUTEROL SULFATE 3 ML: .5; 3 SOLUTION RESPIRATORY (INHALATION) at 08:14

## 2024-11-01 RX ADMIN — ASPIRIN 81 MG CHEWABLE TABLET 243 MG: 81 TABLET CHEWABLE at 14:17

## 2024-11-01 RX ADMIN — FLUTICASONE FUROATE AND VILANTEROL TRIFENATATE 1 PUFF: 200; 25 POWDER RESPIRATORY (INHALATION) at 10:32

## 2024-11-01 RX ADMIN — ROSUVASTATIN CALCIUM 20 MG: 20 TABLET, FILM COATED ORAL at 10:32

## 2024-11-01 RX ADMIN — IPRATROPIUM BROMIDE AND ALBUTEROL SULFATE 3 ML: .5; 3 SOLUTION RESPIRATORY (INHALATION) at 12:07

## 2024-11-01 RX ADMIN — TACROLIMUS 1.5 MG: 1 CAPSULE ORAL at 10:32

## 2024-11-01 RX ADMIN — LOPERAMIDE HYDROCHLORIDE 2 MG: 2 CAPSULE ORAL at 21:59

## 2024-11-01 RX ADMIN — IPRATROPIUM BROMIDE AND ALBUTEROL SULFATE 3 ML: .5; 3 SOLUTION RESPIRATORY (INHALATION) at 19:50

## 2024-11-01 RX ADMIN — PREDNISONE 5 MG: 5 TABLET ORAL at 10:32

## 2024-11-01 RX ADMIN — CARVEDILOL 6.25 MG: 6.25 TABLET, FILM COATED ORAL at 10:32

## 2024-11-01 RX ADMIN — ACETAMINOPHEN 650 MG: 325 TABLET, FILM COATED ORAL at 21:59

## 2024-11-01 RX ADMIN — CARVEDILOL 6.25 MG: 6.25 TABLET, FILM COATED ORAL at 19:14

## 2024-11-01 RX ADMIN — MAGNESIUM SULFATE HEPTAHYDRATE 1 G: 1 INJECTION, SOLUTION INTRAVENOUS at 10:32

## 2024-11-01 NOTE — PROGRESS NOTES
[facility]    Progress Note - Medicine Service, MAROON TEAM 1       Date of Admission:  10/26/2024    Assessment & Plan   Aubrey Duncan is a 69 year old male. He has CAD, DLBCL of esophagus (s/p rituximab), HLD, DM2, CKD 3b, Bilateral lung transplant 2/2 COPD and Alpha-1-antitrypsin deficiency in 2013, GERD, Anemia, and recent BKA who presents for sepsis. Pt initially presented to [facility] in [place] for lethargy on 10/24/24 transferred to our facility on 10/26 for further workup of shock. By the time of arrival at [facility], shock and encephalopathy had resolved. Transplant pulmonology and transplant ID were consulted and patient is receiving treatment for UTI as well as AHRF + new HFrEF.     Today:  - coronary angiogram today. Apixaban held pre-procedure, will follow up cards recs for when to resume.  - resume insulin/diet when back from angiogram  - started losartan 25 mg daily  - Overnight Oximetry study tonight  - medically ready for discharge in 1-2 days     Acute hypoxic respiratory failure likely 2/2 HFrEf as below  H/o bilateral lung transplant 2/2 COPD and Alpha-1-antitrypsin deficiency  Complex Sleep Apnea  Transplanted in 2013 for A1AT deficency. 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 Has not had Tacrolimus level checked in >1 month prior to hospitalization. On check was found to be significantly elevated at 39.8. Now in normal range, transplant pulm titrating to goal. Requires 2L NC intermittently (mostly at night), was not on any O2 at home or TCU. Noted to have Complex Sleep Apnea in 2014, was recommended to have further investigation and an adaptive sero-ventilation device but did not pursue this.   - Wean O2 to maintain O2 sat of >92%.   - Overnight Oximetry study tonight  - Transplant Pulmonology following  - Continue BID  Prednisone (5 and 2.5mg), Dapsone, Montelukast  - Tacrolimus 1.5mg BID (goal 8-10)   - DuoNeb PRN  - Continue Pulmonary toilet  - Diuresis as below     New HFrEF (EF 35-40%)  EF of 55-60% on 1/11/24. Echo from this admission on 10/28/24 showing new EF of 35-40%, moderate diffuse hypokinesis. Difficult to assess WMA due to Afib. No significant valvular abnormalities. EKG on 10/25 without ST changes. Low concern for thyroid dysfunction, PE, ACS, as the cause of this acute presentation. Might be Tacrolimus induced cardiomyopathy vs sepsis  - Coronary angiogram 11/1 to evaluate for obstructive CAD as cause of cardiomyopathy  - Continue PTA 20mg PO Lasix  - 2g NA and 2L fluid restriction  - Recommended that he not use the table salt that he brought from home  - GDMT              - ACEi/ARB: losartan 25 mg daily              - MRA: Spironolactone 12.5mg PO              - BB: PTA Carvedilol 6.25 mg BID              - SGLT2i: Initiate outpt  - Cardiology following     Sepsis c/b suspected septic shock   Initially presented on 10/24 septic with fever, tachycardia, leukopenia, and elevated lactate. UA showing pyuria and bacteruria, urine cx not obtained. CT C/A/P showing R upper and middle lobe ill defined nodules concerning for infectious/inflammatory etiology. No abdominal focus of infection. Procalcitonin uptrending from 16 to 37. Negative Bcx and Urine cx. Negative strep pneumo, legionella, COVID, Flu, RSV, MRSA. Developed shock on AM of 10/25 with further course as below. 10/27 urine culture from OSH growing >100K/CFU e coli. IgG low although adequate at 552 on 10/27, no need for IVIG.  - Histo, B-D glucan, Aspergillus negative  - DSA (10/27) pending  - continue Ciprofloxacin through 11/7 for 14 day course  - Transplant ID consulted.      Septic shock vs Adrenal crisis (resolved)  Became significantly hypotensive AM of 10/25. Thought to be 2/2 septic shock vs acute adrenal insufficiency. Received IVF and stress dose  steroids. Lactate peaked at 7.2 and downtrended. BP has been stable. Lactate, Procalcitonin, and CRP downtrended. Discontinued SDS and BP has remained stable.  - If hypotensive, could consider SDS +/- gentle IVF  - stopped fludracortisone: seems he was on this mostly for hyperkalemia rather than adrenal insufficiency     Pancytopenia - Resolved  h/o normocytic anemia  New pancytopenia with this presentation on top of existing anemia. Baseline Hgb 9-10. Improving since onset. On admission WBC 3.9, Hgb 9.2, and Plt 147. Differential includes marrow suppression from acute infection, DIC, medication effect, and malignancy. Fibrinogen normal. D-dimer mildly elevated at 1.19. Given improvement on abx this was most likely due to infection.   - CTM     h/o Bilateral BKA 2/2 severe PAD and limb ischemia  S/p bilateral BKA (left 9/25 and right 8/28). Noted some wound dehiscence and granulation tissue on L stump over last few weeks but not a significant amount of drainage or purulence.   - WOC consult     Difficult vascular access  Pt has had difficulties with PICC placement by vascular access team. Also has difficulties with PIVs, they flush and draw appropriately however he still develops significant edema in his arms when medications are infusing, as well as severe bruising. Discussed with IR and recommended line (if ultimately needed) would be single lumen cuffed low flow catheter tunneled line into the internal jugular vein. But would not be recommended in the setting of CKD and preservation for possible future dialysis.      History of HIIT  History of DVT/PE  - PTA Apixaban 5mg BID  - Plan to hold Apixaban on 10/31 for coronary angiogram on 11/1     History of CMV Viremia  Last level (10/25) negative. Per OP notes, pt. had been on chronic valcyte, but this was stopped secondary to cost.   - CMV monitoring weekly for 4 weeks given stress dose steroids (next 11/1)     Reported H/o CKD3b   Recent Cr and eGFR have been in  the normal range after bilateral BKA. Current Baseline Cr 0.9-1.0. Unknown baseline prior. Ordering cystatin C for further evaluation. Would interpret these results with the understanding that it may be elevated by high CRP, decreased albumin, and prednisone use.   - Cystatin C  - CTM      Steroid induced DM  A1c of 4.2% on 8/25/24. Home dose of Lantus 18u in morning and Novolog 5u with breakfast and 3u with lunch/dinner. Given his very low A1c and episodes of asymptomatic hypoglycemia in the hospital on his home doses adjustments have been made. Blood sugars on 10/31 have been in the 90's-110 range. Will continue current regimen today and hold insulin tomorrow before coronary angiogram   - Lantus 6u in AM (holding while NPO)  - Vpxucip4N with breakfast/lunch/dinner  - MDSSI  - BS check before meals and in morning  - Hypoglycemia protocol      Hypocalcemia  Hypomagnesemia  Hypermagnesemia  Hypokalemia  Vit D normal at 23 and PTH mildly elevated at 99 on 10/27/24  - CTM and correct hypomagnesemia  - CTM calcium, may provide oral replacement once magnesium is corrected  - replete to K>3.5     GERD  - PTA Pantoprazole 40mg     HTN   - Resumed Coreg 6.25 BID,     CAD  Follows with cardiology, last seen 8/2024. CAD s/p PCI with MEGHNA x 1 to LAD (1/11/24) and PCI with MEGHNA x 1 to RCA (2/16/24). Managed PTA with DAPT (ASA 81 mg and clopidogrel 75 mg). He is to be on DAPT x 1 year followed by lifelong ASA 81 mg monotherapy.   - Continue ASA  - Continue Rosuvastatin  - Cardiology consulted     History of diffuse large b-cell lymphoma of esophagus  - diagnosed after GI bleed in 3/24.  Started on weekly rituximab x 4 doses in 4/2024.  Restaging PET after this showed good response with decreased hypermetabolism in distal duodenum.    - held off on further rituximab given plan for vascular surgery for non-healing foot ulcers.    - follows with Dr. Drake ([facility] oncology) and perhaps with a local oncologist as well.  Plan was for repeat  PET scan in 3 months, which would be this month but I don't see this scheduled.  Recommend follow-up with oncology on discharge.             Diet: Snacks/Supplements Adult: Special K Bar; Between Meals  Fluid restriction 2000 ML FLUID  NPO per Anesthesia Guidelines for Procedure/Surgery Except for: Meds    DVT Prophylaxis: Held for coronary angiogram on 11/1  Naranjo Catheter: Not present  Fluids: None  Lines: None     Cardiac Monitoring: None  Code Status: Full Code      Clinically Significant Risk Factors             # Hypomagnesemia: Lowest Mg = 1.5 mg/dL in last 2 days, will replace as needed   # Hypoalbuminemia: Lowest albumin = 2.2 g/dL at 10/26/2024  5:33 AM, will monitor as appropriate     # Hypertension: Noted on problem list    # Acute heart failure with reduced ejection fraction: last echo with EF <40% and receiving IV diuretics   # Acute Hypoxic Respiratory Failure: Documented O2 saturation < 90%. Continue supplemental oxygen as needed             # Financial/Environmental Concerns: none         Disposition Plan     Expected Discharge Date: 11/07/2024      Destination: inpatient rehabilitation facility  Discharge Comments: treating for sepsis, unclear time frame, likely needs TCU as he came from TCU        The patient's care was discussed with the Attending Physician, Dr. Ellis .    Teri Rojas, MS3  Medical Student  Medicine Service, [facility] TEAM [address] [place] [address]  Securely message with [email or web address] (more info)  Text page via [employer] Paging/Directory   See signed in provider for up to date coverage information  ______________________________________________________________________    Interval History   NAEON. Feels about the same today. Was receiving nebulizer treatment during our visit. Denied any new concerns. Reviewed plan for coronary angio this afternoon.    Physical Exam   Vital Signs: Temp: 97.6  F (36.4  C) Temp src: Oral BP: (!) 145/90 Pulse: 100   Resp:  18 SpO2: 96 % O2 Device: Nasal cannula Oxygen Delivery: 1 LPM  Weight: 131 lbs 9.83 oz    Constitutional: Alert. Sitting in bed comfortably. NAD. Well developed and nourished   Respiratory: On RA. Slightly reduced breath sounds, no crackles or wheezes. Normal inspiratory effort.   Cardiovascular: RRR, No murmurs, rubs, or gallops. Normal S1 and S2.   Musculoskeletal: BKA with dressings on both stumps. Dressings appear CDI.   Skin: scattered bruising to BUE, no other bruises, rashes, lesions, no jaundice.     Medical Decision Making       Please see A&P for additional details of medical decision making.      Data   Most Recent 3 CBC's:  Recent Labs   Lab Test 11/01/24  0552 10/31/24  0416 10/30/24  0439   WBC 8.4 9.0 6.4   HGB 9.7* 9.0* 9.4*   MCV 99 99 99    186 171     Most Recent 3 BMP's:  Recent Labs   Lab Test 11/01/24  0552 11/01/24  0523 10/31/24  2240 10/31/24  1744 10/31/24  0425 10/31/24  0416     --   --  137  --  141   POTASSIUM 4.4  --   --  4.5  --  3.7   CHLORIDE 104  --   --  101  --  105   CO2 25  --   --  26  --  26   BUN 28.1*  --   --  30.9*  --  32.6*   CR 0.73  --   --  0.81  --  0.97   ANIONGAP 9  --   --  10  --  10   ERIN 8.1*  --   --  8.1*  --  8.0*   * 120* 136* 150*   < > 90    < > = values in this interval not displayed.     Most Recent 2 LFT's:  Recent Labs   Lab Test 10/27/24  0534 10/26/24  1753   AST 45 47*   ALT 24 29   ALKPHOS 110 117   BILITOTAL 0.5 0.4     Most Recent 3 Creatinines:  Recent Labs   Lab Test 11/01/24  0552 10/31/24  1744 10/31/24  0416   CR 0.73 0.81 0.97     Most Recent 3 Hemoglobins:  Recent Labs   Lab Test 11/01/24  0552 10/31/24  0416 10/30/24  0439   HGB 9.7* 9.0* 9.4*     Most Recent 6 Bacteria Isolates From Any Culture (See EPIC Reports for Culture Details):  Recent Labs   Lab Test 07/06/21  1101 11/27/20  1430 10/16/20  0939 09/11/20  0818 07/16/20  0856 07/02/20  1042   CULT Heavy growth  Klebsiella pneumoniae  *  Heavy  growth  Pseudomonas aeruginosa  * Moderate growth  Pseudomonas aeruginosa  * Heavy growth  Pseudomonas aeruginosa  Some antibiotics have been suppressed due to the known inducible beta lactamase activity.   Please contact Microbiology for more information.  * Heavy growth  Escherichia coli  *  Heavy growth  Enterococcus faecalis  * Moderate growth  Stenotrophomonas maltophilia  * No growth after 6 days  No growth after 6 days     Most Recent 6 glucoses:  Recent Labs   Lab Test 11/01/24  0552 11/01/24  0523 10/31/24  2240 10/31/24  1744 10/31/24  1734 10/31/24  1323   * 120* 136* 150* 142* 110*     Most Recent ESR & CRP:  Recent Labs   Lab Test 10/31/24  0135 10/25/24  0024 08/25/24  1632 07/02/20  1042   SED  --   --  52* 16*   CRP  --   --   --  0.4   CRPI 44.10*   < > 82.30*  --     < > = values in this interval not displayed.     Tacrolimus by [employer] Mass Spectrometry: 7.1

## 2024-11-01 NOTE — PRE-PROCEDURE
GENERAL PRE-PROCEDURE:   Procedure:  Coronary angiogram with possible percutaneous coronary intervention  Date/Time:  11/1/2024 3:14 PM    Verbal consent obtained?: Yes    Written consent obtained?: Yes    Risks and benefits: Risks, benefits and alternatives were discussed    DC Plan: Appropriate discharge home plan in place for patients who are going home after procedure   Consent given by:  Patient  Patient states understanding of procedure being performed: Yes    Patient's understanding of procedure matches consent: Yes    Procedure consent matches procedure scheduled: Yes    Expected level of sedation:  Moderate  Appropriately NPO:  Yes  ASA Class:  3  Mallampati  :  Grade 1- soft palate, uvula, tonsillar pillars, and posterior pharyngeal wall visible  Lungs:  Lungs clear with good breath sounds bilaterally  Heart:  Normal heart sounds and rate  History & Physical reviewed:  History and physical reviewed and no updates needed  Statement of review:  I have reviewed the lab findings, diagnostic data, medications, and the plan for sedation

## 2024-11-01 NOTE — PLAN OF CARE
Goal Outcome Evaluation:      Plan of Care Reviewed With: patient    Overall Patient Progress: improvingOverall Patient Progress: improving    Outcome Evaluation: Pt sating >90% on RA, IS & Aerobika use encouraged.  L BKA wound changed today.    Neuro: A&Ox4.   Cardiac: No tele.  VSS.   Respiratory: Sating >90% on RA, occasionally desating for a few seconds.  IS & Aerobika use encouraged.  GI/: Adequate urine output via urinal.  BM X2.  PRN Imodium given x1.  Diet/appetite: Tolerating 2g Na diet & 2L FR.  Eating well.  Activity:  Assist of 2 w/ transfer board to wheelchair, up in halls w/ PT.  Pain: At acceptable level on current regimen.   Skin: No new deficits noted.  L BKA dressing changed today.  R arm weaping minimally.  LDA's: R PIV SL.    Plan: Continue with POC. Notify primary team with changes.

## 2024-11-01 NOTE — PLAN OF CARE
Neuro: A&Ox4.   Cardiac: Pt not on tele. VSS.   Respiratory: Sating >92% on between RA & 2L NC while sleeping.  GI/: Adequate urine output via urinal. No BM over night. PRN imodium given x1.   Diet/appetite: NPO as of 0000.   Activity:  Assist of 2 w/ lift, not OOB during shift.   Pain: PRN tylenol given x1 for L BKA pain.   Skin: No new deficits noted.  LDA's: R PIV SL.    Plan: Continue with POC. Notify primary team with changes.    Angiogram planned for today 11/1.     Goal Outcome Evaluation:      Plan of Care Reviewed With: patient    Overall Patient Progress: no change    Outcome Evaluation: Pt needing 2L NC overnight d/t frequent desating while sleeping.

## 2024-11-01 NOTE — PROGRESS NOTES
Patient Quality Outreach    Patient is due for the following:   Hypertension -  BP check    Next Steps:   No follow up needed at this time.    Type of outreach:    Chart review performed, no outreach needed. Blood pressure taken during last procedure at  [facility] . Flowsheets updated.    Questions for provider review:    None           Jenna Aguilar RN

## 2024-11-01 NOTE — PROGRESS NOTES
Brief Cardiology Note   11/01/2024    Aubrey Duncan MRN# [id number]   Age: 69 year old YOB: 1955     Coronary angiogram without significant progression of CAD and no evidence of obstructive epicardial disease, only mild ISR. Therefore, the new cardiomyopathy is likely nonischemic in etiology. 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 He should have follow up with his outpatient cardiologist coordinated prior to discharge with plan to repeat echocardiogram before this. In the interim, he will be discharged on low dose beta blocker, ARB, and MRA as previously outlined. Hold on SGLT2i given recent urosepsis.     Cardiology will sign off    Billy Haile MD  Cardiology Fellow

## 2024-11-01 NOTE — PROGRESS NOTES
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  Recently s/p right BKA in August and left BKA on 24 and has been at [facility] since surgery in September.  Increased lethargy and confusion 10/24, seen in the [facility] ER with concern for pneumonia and UTI s/p IV fluids and ceftriaxone, transferred to [facility] for sepsis, started on broad spectrum ABX, IV fluids, and stress dose steroids.  The patient was transferred to [facility] on 10/26 for further work up of sepsis and encephalopathy, possible UTI as source.  Also with supratherapeutic tacrolimus level.  Anticipate discharge back to TCU once medically ready.     Today's recommendations:   - Bacterial sputum culture ordered if able to recollect (oral contamination x2)  - Consider serial imaging for the lung nodules per transplant ID.  Of note, pt. is due for repeat PET for interval follow up PTLD of duodenum, recommend follow-up with oncology on discharge.  - Diuresis per primary  - Plan for coronary angiogram possibly as soon as today per cardiology  - Supplemental oxygen prn to maintain SpO2 >92%, overnight oximetry study 24-48h prior to discharge home from TCU  - DSA (10/27) pending  - Tacrolimus level to trend remains subtherapeutic, defer dose adjustment in favor of steady state level ordered   - Chronic azithromycin held while on ciprofloxacin, resume  once course complete  - CMV weekly monitoring x4 given recent stress dose steroids (pending 11/1)  - PO ciprofloxacin through 11/7 for total 14d course for UTI per transplant ID, QTc monitoring per primary     Acute hypoxic respiratory failure:  Concern for RUL/RML pneumonia:  Encephalopathy, Resolved: Admitted from OSH with sepsis and encephalopathy, likely UTI as source with possible pneumonia.  Also with supratherapeutic tacrolimus level possibly contributing to encephalopathy, now improved.  CT CAP (10/25) noted interval development of new hazy ill-defined pulmonary nodule involving RUL and RML, interval decrease in prior consolidation posterior RLL with minimal residual atelectasis remaining, minimal atelectasis LLL with stable chronic left basilar pleural fluid, no new lymphadenopathy, unchanged pancreatic cyst, and heterogeneous enhancement to a moderately enlarged prostate.  Tmax 102.8.  CRP, procal, and LA elevated, all now improving.  S/p stress dose steroids (stopped 10/27).  MRSA swab, covid/influenza/RSV, Legionella/Strep pneumoniae (10/25) all negative, respiratory panel (10/26) negative.  Fungitell, A. galactomannan, Fungal Ab, and Histo blood Ag (10/25) all negative.  Histo galactomannan Ag urine (10/26) negative.  Suspect hypoxia in part due to hypervolemia, CXR (10/27) with increased size of cardiac silhouette and increased pulmonary edema.  Repeat CXR (10/28) with decreased pulmonary edema with mild residual edematous and/or atelectatic changes and increased small left pleural effusion.  Echo (10/28) with EF 38%, normal RV, no significant valvular abnormalities.  Cardiology consulted 10/30 given echo findings and cardiac history, see their note for details.  On RA during the day, 1-2L NC overnight (no oxygen use at baseline).    - Bacterial sputum culture ordered if able to recollect (oral contamination x2)  - Consider serial imaging for the lung nodules per transplant ID.  Of note, pt. is due for  repeat PET for interval follow up PTLD of duodenum, recommend follow-up with oncology on discharge.  - ABX as below per transplant ID  - Diuresis per primary  - Plan for coronary angiogram possibly as soon as 11/1 per cardiology  - Nebs: Duoneb QID  - IS and Aerobika q1-2h while awake   - Supplemental oxygen prn to maintain SpO2 >92%, overnight oximetry study 24-48h prior to discharge home from TCU     S/p bilateral sequential lung transplant (BSLT) for A1AT: DSA negative 11/17/23.  Last seen by Dr. Murphy in May and at that time PFTs had improved to recent best (in two years).  EBV (10/27) negative.    - DSA (10/27) pending  - Pulmonary follow up currently scheduled 12/19     Immunosuppression:  - Tacrolimus 1.5 mg BID (increased 10/30).  Goal level 8-10.  Level to trend 11/1 remains subtherapeutic at 6.4, defer dose adjustment in favor of steady state level ordered 11/2.  - Prednisone 5 mg qAM / 2.5 mg qPM     Prophylaxis:   - Dapsone for PJP ppx  - S/p acyclovir for shingles prophylaxis s/p rituximab per chart (started on 4/23, stopped 10/27)     CLAD: PTA azithromycin 250 mg three times/week (for QTc of 460) --> held while on ciprofloxacin, resume once course complete, Breo (substitute for Advair), and montelukast.     H/o recurrent CMV viremia: Last level (10/25) negative.  Per OP notes, pt. had been on chronic valcyte, but this was stopped secondary to cost.  - CMV weekly monitoring x4 given recent stress dose steroids (pending 11/1)     Hypogammaglobulinemia: IgG low although adequate at 552 on 10/27, no indication for IVIG at this time.     Other relevant problems being managed by the primary team:     Probable urosepsis:  Prostatic enhancement see on CT: Admitted with sepsis and encephalopathy as above.  CT CAP findings as above.  Urine culture (10/24) with E. coli and Klebsiella pneumoniae.  Urine culture (10/25) negative.  Transplant ID consulted, see their note for details.   - ABX: PO ciprofloxacin  (10/29-11/7) for total 14d course per transplant ID, (signed off 10/29) --> QTc monitoring per primary, s/p additional ceftriaxone (10/27-10/28), minocycline (10/26-10/28, h/o Steno), Zosyn (10/25-10/26), doxycycline (10/25-10/26), and IV vancomycin (10/25-10/27)     Pancytopenia: Possibly secondary to infection, improvement noted on CBC 10/28.  - Management per primary    We appreciate the excellent care provided by the [place] [id number] team.  Recommendations communicated via this note.  Will continue to follow along closely, please do not hesitate to call with any questions or concerns.    Patient discussed with Dr. Cristina.    Delma Henry PA-C  Inpatient [facility]  Pulmonary CF/Transplant     Subjective & Interval History:     On RA during the day, 1-2L NC overnight.  Denies dyspnea or cough/sputum.  Loose stools slowing down, using Imodium.  Net negative 830 ml with diuresis yesterday.    Review of Systems:     C: No fever, no chills, + decreased weight, no change in appetite  INTEGUMENTARY/SKIN: No rash or obvious new lesions  ENT/MOUTH: No sore throat, no sinus pain, no nasal congestion or drainage  RESP: See interval history  CV: No chest pain, no palpitations, + improving peripheral edema  GI: No nausea, no vomiting, no reflux symptoms  : No dysuria  MUSCULOSKELETAL: + left BKA pain  ENDOCRINE: Blood sugars with adequate control  NEURO: No headache, no numbness or tingling  PSYCHIATRIC: Mood stable    Physical Exam:     All notes, images, and labs from past 24 hours (at minimum) were reviewed.    Vital signs:  Temp: 97.5  F (36.4  C) Temp src: Oral BP: (!) 158/100 Pulse: 80   Resp: 16 SpO2: 95 % O2 Device: None (Room air) Oxygen Delivery: 1 LPM   Weight: 59.7 kg (131 lb 9.8 oz)  I/O:   Intake/Output Summary (Last 24 hours) at 11/1/2024 0951  Last data filed at 11/1/2024 0900  Gross per 24 hour   Intake 360 ml   Output 1475 ml   Net -1115 ml     Constitutional: Sitting up in bed, in no apparent distress.    HEENT: Eyes with pink conjunctivae, anicteric.  Oral mucosa moist without lesions.   PULM: Mildly diminished air flow LLL.  No crackles, no rhonchi, no wheezes.  Non-labored breathing on RA.  CV: Normal S1 and S2.  RRR.  No murmur, gallop, or rub.  + L>RUE edema.   ABD: NABS, soft, nontender, nondistended.    MSK: Moves all extremities.  No apparent muscle wasting.   NEURO: Alert and conversant.   SKIN: Warm, dry.  No rash on limited exam.   PSYCH: Mood stable.     Data:     LABS    CMP:   Recent Labs   Lab 11/01/24  0552 11/01/24  0523 10/31/24  2240 10/31/24  1744 10/31/24  0425 10/31/24  0416 10/30/24  0912 10/30/24  0439 10/27/24  0916 10/27/24  0534 10/26/24  1829 10/26/24  1753 10/26/24  0728 10/26/24  0533 10/25/24  1859 10/25/24  1652     --   --  137  --  141  --  143   < > 140  --  138  --  140  --  135   POTASSIUM 4.4  --   --  4.5  --  3.7  --  4.0   < > 3.7  --  3.2*  --  3.8  --  4.8  4.8   CHLORIDE 104  --   --  101  --  105  --  106   < > 106  --  102  --  104  --  103   CO2 25  --   --  26  --  26  --  27   < > 23  --  21*  --  21*  --  17*   ANIONGAP 9  --   --  10  --  10  --  10   < > 11  --  15  --  15  --  15   * 120* 136* 150*   < > 90   < > 110*   < > 150*   < > 151*   < > 198*   < > 119*   BUN 28.1*  --   --  30.9*  --  32.6*  --  27.6*   < > 36.3*  --  30.0*  --  22.1  --  23.1*   CR 0.73  --   --  0.81  --  0.97  --  0.69   < > 1.00  --  1.02  --  0.79  --  0.92   GFRESTIMATED >90  --   --  >90  --  83  --  >90   < > 80  --  79  --  >90  --  89   ERIN 8.1*  --   --  8.1*  --  8.0*  --  7.8*   < > 7.4*  --  7.5*  --  7.6*  --  7.6*   MAG 1.8  --   --  2.2  --  1.5*  --  1.9   < > 2.6*  --  2.7*   < > 1.4*  --   --    PHOS  --   --   --   --   --   --   --   --   --  3.3  --   --   --  3.7  --   --    PROTTOTAL  --   --   --   --   --   --   --   --   --  4.8*  --  4.8*  --  4.5*  --  4.7*   ALBUMIN  --   --   --   --   --   --   --   --   --  2.7*  --  2.4*  --  2.2*  --   "2.2*   BILITOTAL  --   --   --   --   --   --   --   --   --  0.5  --  0.4  --  0.4  --  0.5   ALKPHOS  --   --   --   --   --   --   --   --   --  110  --  117  --  104  --  113   AST  --   --   --   --   --   --   --   --   --  45  --  47*  --  47*  --  48*   ALT  --   --   --   --   --   --   --   --   --  24  --  29  --  33  --  34    < > = values in this interval not displayed.     CBC:   Recent Labs   Lab 11/01/24  0552 10/31/24  0416 10/30/24  0439 10/28/24  0913   WBC 8.4 9.0 6.4 7.1   RBC 3.14* 2.93* 3.05* 3.43*   HGB 9.7* 9.0* 9.4* 10.6*   HCT 31.2* 29.1* 30.2* 35.5*   MCV 99 99 99 104*   MCH 30.9 30.7 30.8 30.9   MCHC 31.1* 30.9* 31.1* 29.9*   RDW 16.8* 16.4* 16.4* 16.6*    186 171 182       INR:   Recent Labs   Lab 10/27/24  0534   INR 1.59*       Glucose:   Recent Labs   Lab 11/01/24  0552 11/01/24  0523 10/31/24  2240 10/31/24  1744 10/31/24  1734 10/31/24  1323   * 120* 136* 150* 142* 110*       Blood Gas:   Recent Labs   Lab 10/27/24  0533 10/26/24  0533 10/25/24  2344   PHV 7.47* 7.46* 7.41   PCO2V 36* 33* 33*   PO2V 70* 48* 90*   HCO3V 26 24 21   CAMPBELL 2.5 0.2 -3.1*   O2PER 2 0 28       Culture Data No results for input(s): \"CULT\" in the last 168 hours.    Virology Data:   Lab Results   Component Value Date    INFLUA Negative 01/28/2014    FLUAH1 Not Detected 10/26/2024    FLUAH3 Not Detected 10/26/2024    [id number] Not Detected 10/26/2024    IFLUB Not Detected 10/26/2024    RSVA Not Detected 10/26/2024    RSVB Not Detected 10/26/2024    PIV1 Not Detected 10/26/2024    PIV2 Not Detected 10/26/2024    PIV3 Not Detected 10/26/2024    HMPV Not Detected 10/26/2024    HRVS Negative 02/24/2019    ADVBE Negative 02/24/2019    ADVC Negative 02/24/2019    ADVC Negative 08/05/2014    ADVC Negative 06/03/2014       Historical CMV results (last 3 of prior testing):  Lab Results   Component Value Date    CMVQNT Not Detected 10/25/2024    CMVQNT Not Detected 07/12/2024    CMVQNT Not Detected 05/16/2024 "     Lab Results   Component Value Date    CMVLOG Not Calculated 06/03/2021    CMVLOG Not Calculated 05/12/2021    CMVLOG Not Calculated 02/19/2020       Urine Studies    Recent Labs   Lab Test 10/25/24  1250 08/29/24  1522   URINEPH 5.0 5.0   NITRITE Negative Negative   LEUKEST Trace* Negative   WBCU 12* 1       Most Recent [place] Pulmonary Function Testing (FVC/FEV1 only)  FVC-Pre   Date Value Ref Range Status   05/16/2024 3.69 L    11/17/2023 3.67 L    05/25/2023 3.74 L    11/17/2022 3.68 L      FVC-%Pred-Pre   Date Value Ref Range Status   05/16/2024 101 %    11/17/2023 100 %    05/25/2023 93 %    11/17/2022 91 %      FEV1-Pre   Date Value Ref Range Status   05/16/2024 3.03 L    11/17/2023 2.93 L    05/25/2023 2.94 L    11/17/2022 2.80 L      FEV1-%Pred-Pre   Date Value Ref Range Status   05/16/2024 108 %    11/17/2023 104 %    05/25/2023 96 %    11/17/2022 91 %        IMAGING    No results found for this or any previous visit (from the past 48 hours).

## 2024-11-01 NOTE — PLAN OF CARE
Neuro: A&Ox4. Not impulsive.  Cardiac: SR. SBP 140s this shift, HR maintains below 100.  Respiratory: saturations maintain above 93% on RA. Pt did not need oxygen during the day. Overnight oximetry study to be done tonight.  GI/: Adequate urine output. No BM this shift.  Diet/appetite: Tolerating clear liquid diet after angiogram procedure this afternoon.   Activity:  pt not oob this shift. Family brought pt's own w/c and transfer board to pt's room this afternoon.  Pain: pt states no pain this shift.   Skin: L femoral groin site WDL post procedure.  LDA's: PIV    Plan: Continue with POC. Notify primary team with changes.   Problem: Comorbidity Management  Goal: Blood Pressure in Desired Range  Outcome: Progressing     Problem: Comorbidity Management  Goal: Blood Glucose Levels Within Targeted Range  Outcome: Met

## 2024-11-02 LAB
ANION GAP SERPL CALCULATED.3IONS-SCNC: 9 MMOL/L (ref 7–15)
BUN SERPL-MCNC: 24.9 MG/DL (ref 8–23)
CALCIUM SERPL-MCNC: 8.2 MG/DL (ref 8.8–10.4)
CHLORIDE SERPL-SCNC: 102 MMOL/L (ref 98–107)
CREAT SERPL-MCNC: 0.74 MG/DL (ref 0.67–1.17)
CRP SERPL-MCNC: 19.4 MG/L
EGFRCR SERPLBLD CKD-EPI 2021: >90 ML/MIN/1.73M2
ERYTHROCYTE [DISTWIDTH] IN BLOOD BY AUTOMATED COUNT: 17 % (ref 10–15)
GLUCOSE BLDC GLUCOMTR-MCNC: 111 MG/DL (ref 70–99)
GLUCOSE BLDC GLUCOMTR-MCNC: 118 MG/DL (ref 70–99)
GLUCOSE BLDC GLUCOMTR-MCNC: 136 MG/DL (ref 70–99)
GLUCOSE BLDC GLUCOMTR-MCNC: 145 MG/DL (ref 70–99)
GLUCOSE BLDC GLUCOMTR-MCNC: 146 MG/DL (ref 70–99)
GLUCOSE BLDC GLUCOMTR-MCNC: 167 MG/DL (ref 70–99)
GLUCOSE SERPL-MCNC: 137 MG/DL (ref 70–99)
HCO3 SERPL-SCNC: 26 MMOL/L (ref 22–29)
HCT VFR BLD AUTO: 30.2 % (ref 40–53)
HGB BLD-MCNC: 9.2 G/DL (ref 13.3–17.7)
MAGNESIUM SERPL-MCNC: 1.7 MG/DL (ref 1.7–2.3)
MCH RBC QN AUTO: 30.4 PG (ref 26.5–33)
MCHC RBC AUTO-ENTMCNC: 30.5 G/DL (ref 31.5–36.5)
MCV RBC AUTO: 100 FL (ref 78–100)
PLATELET # BLD AUTO: 228 10E3/UL (ref 150–450)
POTASSIUM SERPL-SCNC: 4.8 MMOL/L (ref 3.4–5.3)
RBC # BLD AUTO: 3.03 10E6/UL (ref 4.4–5.9)
SODIUM SERPL-SCNC: 137 MMOL/L (ref 135–145)
TACROLIMUS BLD-MCNC: 5.6 UG/L (ref 5–15)
TME LAST DOSE: NORMAL H
TME LAST DOSE: NORMAL H
WBC # BLD AUTO: 7.9 10E3/UL (ref 4–11)

## 2024-11-02 PROCEDURE — 999N000157 HC STATISTIC RCP TIME EA 10 MIN

## 2024-11-02 PROCEDURE — 93010 ELECTROCARDIOGRAM REPORT: CPT | Performed by: INTERNAL MEDICINE

## 2024-11-02 PROCEDURE — 250N000013 HC RX MED GY IP 250 OP 250 PS 637

## 2024-11-02 PROCEDURE — 93005 ELECTROCARDIOGRAM TRACING: CPT

## 2024-11-02 PROCEDURE — 250N000009 HC RX 250: Performed by: STUDENT IN AN ORGANIZED HEALTH CARE EDUCATION/TRAINING PROGRAM

## 2024-11-02 PROCEDURE — 250N000011 HC RX IP 250 OP 636

## 2024-11-02 PROCEDURE — 85018 HEMOGLOBIN: CPT

## 2024-11-02 PROCEDURE — 250N000013 HC RX MED GY IP 250 OP 250 PS 637: Performed by: STUDENT IN AN ORGANIZED HEALTH CARE EDUCATION/TRAINING PROGRAM

## 2024-11-02 PROCEDURE — 120N000003 HC R&B IMCU UMMC

## 2024-11-02 PROCEDURE — 250N000012 HC RX MED GY IP 250 OP 636 PS 637

## 2024-11-02 PROCEDURE — 94762 N-INVAS EAR/PLS OXIMTRY CONT: CPT

## 2024-11-02 PROCEDURE — 80197 ASSAY OF TACROLIMUS: CPT | Performed by: NURSE PRACTITIONER

## 2024-11-02 PROCEDURE — 94640 AIRWAY INHALATION TREATMENT: CPT | Mod: 76

## 2024-11-02 PROCEDURE — 99232 SBSQ HOSP IP/OBS MODERATE 35: CPT | Mod: GC | Performed by: STUDENT IN AN ORGANIZED HEALTH CARE EDUCATION/TRAINING PROGRAM

## 2024-11-02 PROCEDURE — 36415 COLL VENOUS BLD VENIPUNCTURE: CPT | Performed by: NURSE PRACTITIONER

## 2024-11-02 PROCEDURE — 250N000012 HC RX MED GY IP 250 OP 636 PS 637: Performed by: NURSE PRACTITIONER

## 2024-11-02 PROCEDURE — 83735 ASSAY OF MAGNESIUM: CPT

## 2024-11-02 PROCEDURE — 80048 BASIC METABOLIC PNL TOTAL CA: CPT

## 2024-11-02 RX ORDER — MAGNESIUM SULFATE HEPTAHYDRATE 40 MG/ML
2 INJECTION, SOLUTION INTRAVENOUS ONCE
Status: COMPLETED | OUTPATIENT
Start: 2024-11-02 | End: 2024-11-02

## 2024-11-02 RX ORDER — LOSARTAN POTASSIUM 50 MG/1
50 TABLET ORAL DAILY
Status: DISCONTINUED | OUTPATIENT
Start: 2024-11-03 | End: 2024-11-05 | Stop reason: HOSPADM

## 2024-11-02 RX ADMIN — FUROSEMIDE 20 MG: 20 TABLET ORAL at 09:33

## 2024-11-02 RX ADMIN — APIXABAN 5 MG: 5 TABLET, FILM COATED ORAL at 20:22

## 2024-11-02 RX ADMIN — MONTELUKAST 10 MG: 10 TABLET, FILM COATED ORAL at 20:22

## 2024-11-02 RX ADMIN — IPRATROPIUM BROMIDE AND ALBUTEROL SULFATE 3 ML: .5; 3 SOLUTION RESPIRATORY (INHALATION) at 08:24

## 2024-11-02 RX ADMIN — SPIRONOLACTONE 12.5 MG: 25 TABLET, FILM COATED ORAL at 09:33

## 2024-11-02 RX ADMIN — INSULIN GLARGINE 6 UNITS: 100 INJECTION, SOLUTION SUBCUTANEOUS at 09:21

## 2024-11-02 RX ADMIN — CARVEDILOL 6.25 MG: 6.25 TABLET, FILM COATED ORAL at 09:31

## 2024-11-02 RX ADMIN — Medication 60 ML: at 09:40

## 2024-11-02 RX ADMIN — INSULIN ASPART 3 UNITS: 100 INJECTION, SOLUTION INTRAVENOUS; SUBCUTANEOUS at 09:25

## 2024-11-02 RX ADMIN — TACROLIMUS 1.5 MG: 1 CAPSULE ORAL at 09:31

## 2024-11-02 RX ADMIN — PANTOPRAZOLE SODIUM 40 MG: 40 TABLET, DELAYED RELEASE ORAL at 09:31

## 2024-11-02 RX ADMIN — ACETAMINOPHEN 650 MG: 325 TABLET, FILM COATED ORAL at 20:40

## 2024-11-02 RX ADMIN — Medication 1 TABLET: at 13:18

## 2024-11-02 RX ADMIN — Medication 2 CAPSULE: at 20:22

## 2024-11-02 RX ADMIN — CIPROFLOXACIN HYDROCHLORIDE 500 MG: 250 TABLET, FILM COATED ORAL at 20:23

## 2024-11-02 RX ADMIN — PREDNISONE 2.5 MG: 2.5 TABLET ORAL at 20:23

## 2024-11-02 RX ADMIN — MAGNESIUM SULFATE HEPTAHYDRATE 2 G: 2 INJECTION, SOLUTION INTRAVENOUS at 13:30

## 2024-11-02 RX ADMIN — CIPROFLOXACIN HYDROCHLORIDE 500 MG: 250 TABLET, FILM COATED ORAL at 09:31

## 2024-11-02 RX ADMIN — TACROLIMUS 1.5 MG: 1 CAPSULE ORAL at 17:56

## 2024-11-02 RX ADMIN — LOSARTAN POTASSIUM 25 MG: 25 TABLET, FILM COATED ORAL at 09:31

## 2024-11-02 RX ADMIN — IPRATROPIUM BROMIDE AND ALBUTEROL SULFATE 3 ML: .5; 3 SOLUTION RESPIRATORY (INHALATION) at 20:00

## 2024-11-02 RX ADMIN — CARVEDILOL 6.25 MG: 6.25 TABLET, FILM COATED ORAL at 17:56

## 2024-11-02 RX ADMIN — Medication 2 CAPSULE: at 09:42

## 2024-11-02 RX ADMIN — FLUTICASONE FUROATE AND VILANTEROL TRIFENATATE 1 PUFF: 200; 25 POWDER RESPIRATORY (INHALATION) at 09:40

## 2024-11-02 RX ADMIN — DAPSONE 50 MG: 25 TABLET ORAL at 09:33

## 2024-11-02 RX ADMIN — PREDNISONE 5 MG: 5 TABLET ORAL at 09:32

## 2024-11-02 RX ADMIN — ACETAMINOPHEN 650 MG: 325 TABLET, FILM COATED ORAL at 03:32

## 2024-11-02 RX ADMIN — APIXABAN 5 MG: 5 TABLET, FILM COATED ORAL at 09:31

## 2024-11-02 RX ADMIN — ASPIRIN 81 MG: 81 TABLET ORAL at 09:31

## 2024-11-02 RX ADMIN — VENLAFAXINE HYDROCHLORIDE 37.5 MG: 37.5 CAPSULE, EXTENDED RELEASE ORAL at 09:31

## 2024-11-02 RX ADMIN — LOPERAMIDE HYDROCHLORIDE 2 MG: 2 CAPSULE ORAL at 09:34

## 2024-11-02 RX ADMIN — MAGNESIUM OXIDE TAB 400 MG (241.3 MG ELEMENTAL MG) 800 MG: 400 (241.3 MG) TAB at 23:13

## 2024-11-02 RX ADMIN — IPRATROPIUM BROMIDE AND ALBUTEROL SULFATE 3 ML: .5; 3 SOLUTION RESPIRATORY (INHALATION) at 16:15

## 2024-11-02 NOTE — PROGRESS NOTES
"[facility]    Progress Note - Medicine Service, MAROON TEAM 1       Date of Admission:  10/26/2024    Assessment & Plan   Aubrey Duncan is a 69 year old male. He has CAD, DLBCL of esophagus (s/p rituximab), HLD, DM2, CKD 3b, Bilateral lung transplant 2/2 COPD and Alpha-1-antitrypsin deficiency in 2013, GERD, Anemia, and recent BKA who presents for sepsis. Pt initially presented to [facility] in [place] for lethargy on 10/24/24 transferred to our facility on 10/26 for further workup of shock. By the time of arrival at [facility], shock and encephalopathy had resolved. Transplant pulmonology and transplant ID were consulted. Has completed treatment for UTI. Initiating GDMT for new onset HFrEF. Now stable for discharge to TCU     Today:  - Increase Losartan to 50mg for tomorrow  - 2mg IV Magnesium for Mg of 1.7  - He is medically ready for discharge at this point, pending transfer back to TCU ([facility]) on Monday due to staffing issues on their end  - No additional lab draws required  - Per nursing note \"Outcome Evaluation: Overnight oximetry study done, patient freqently desatting while asleep, placed 2L NC and maintaining O2 saturation>92%.\"     Acute hypoxic respiratory failure likely 2/2 HFrEf as below  H/o bilateral lung transplant 2/2 COPD and Alpha-1-antitrypsin deficiency  Complex Sleep Apnea  Transplanted in 2013 for A1AT deficency. 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 Has not had Tacrolimus level checked in >1 month prior to hospitalization. On check was found to be significantly elevated at 39.8. Now in normal range, transplant pulm titrating to goal. Requires 2L NC intermittently (mostly at night), was not on any O2 at home or TCU. Noted to have Complex Sleep Apnea in 2014, was recommended to have further investigation " and an adaptive sero-ventilation device but did not pursue this. Overnight oximetry confirms that he needs 2L NC to maintain Sat >92%.   - Wean O2 to maintain O2 sat of >92%.   - Transplant Pulmonology following  - Continue BID Prednisone (5 and 2.5mg), Dapsone, Montelukast  - Tacrolimus 1.5mg BID (goal 8-10)   - DuoNeb PRN  - Continue Pulmonary toilet  - Diuresis as below     New HFrEF (EF 35-40%)  EF of 55-60% on 1/11/24. Echo from this admission on 10/28/24 showing new EF of 35-40%, moderate diffuse hypokinesis. Difficult to assess WMA due to Afib. No significant valvular abnormalities. EKG on 10/25 without ST changes. Low concern for thyroid dysfunction, PE, ACS, as the cause of this acute presentation. Might be Tacrolimus induced cardiomyopathy vs sepsis. Coronary angiogram on 11/1 without new angiographic lesions to explain drop in EF.   - Continue PTA 20mg PO Lasix  - 2g NA and 2L fluid restriction  - Recommended that he not use the table salt that he brought from home  - GDMT              - ACEi/ARB: losartan 50 mg daily              - MRA: Spironolactone 12.5mg PO              - BB: PTA Carvedilol 6.25 mg BID              - SGLT2i: Initiate outpt, holding now due to urosepsis  - Cardiology following  - Likely      Sepsis c/b suspected septic shock   Initially presented on 10/24 septic with fever, tachycardia, leukopenia, and elevated lactate. UA showing pyuria and bacteruria, urine cx not obtained. CT C/A/P showing R upper and middle lobe ill defined nodules concerning for infectious/inflammatory etiology. No abdominal focus of infection. Procalcitonin uptrending from 16 to 37. Negative Bcx and Urine cx. Negative strep pneumo, legionella, COVID, Flu, RSV, MRSA. Developed shock on AM of 10/25 with further course as below. 10/27 urine culture from OSH growing >100K/CFU e coli. IgG low although adequate at 552 on 10/27, no need for IVIG.  - Histo, B-D glucan, Aspergillus negative  - DSA negative  - continue  Ciprofloxacin through 11/7 for 14 day course  - Transplant ID consulted.      Septic shock vs Adrenal crisis (resolved)  Became significantly hypotensive AM of 10/25. Thought to be 2/2 septic shock vs acute adrenal insufficiency. Received IVF and stress dose steroids. Lactate peaked at 7.2 and downtrended. BP has been stable. Lactate, Procalcitonin, and CRP downtrended. Discontinued SDS and BP has remained stable.  - If hypotensive, could consider SDS +/- gentle IVF  - stopped fludracortisone: seems he was on this mostly for hyperkalemia rather than adrenal insufficiency     Pancytopenia - Resolved  h/o normocytic anemia  New pancytopenia with this presentation on top of existing anemia. Baseline Hgb 9-10. Improving since onset. On admission WBC 3.9, Hgb 9.2, and Plt 147. Differential includes marrow suppression from acute infection, DIC, medication effect, and malignancy. Fibrinogen normal. D-dimer mildly elevated at 1.19. Given improvement on abx this was most likely due to infection.   - CTM     h/o Bilateral BKA 2/2 severe PAD and limb ischemia  S/p bilateral BKA (left 9/25 and right 8/28). Noted some wound dehiscence and granulation tissue on L stump over last few weeks but not a significant amount of drainage or purulence.   - WOC consult     Difficult vascular access  Pt has had difficulties with PICC placement by vascular access team. Also has difficulties with PIVs, they flush and draw appropriately however he still develops significant edema in his arms when medications are infusing, as well as severe bruising. Discussed with IR and recommended line (if ultimately needed) would be single lumen cuffed low flow catheter tunneled line into the internal jugular vein. But would not be recommended in the setting of CKD and preservation for possible future dialysis.      History of HIT  History of DVT/PE  - PTA Apixaban 5mg BID    History of CMV Viremia  Last level (10/25) negative. Per OP notes, pt. had been on  chronic valcyte, but this was stopped secondary to cost.   - CMV monitoring weekly for 4 weeks given stress dose steroids (next 11/8)   - Negative on 11/1     Reported H/o CKD3b   Recent Cr and eGFR have been in the normal range after bilateral BKA. Current Baseline Cr 0.9-1.0. Unknown baseline prior.  Cystatin C of 1.4 with eGFR of 48.  - CTM      Steroid induced DM  A1c of 4.2% on 8/25/24. Home dose of Lantus 18u in morning and Novolog 5u with breakfast and 3u with lunch/dinner. Given his very low A1c and episodes of asymptomatic hypoglycemia in the hospital on his home doses adjustments have been made. - Lantus 6u in AM   - [id number] with breakfast/lunch/dinner  - MDSSI  - BS check before meals and in morning  - Hypoglycemia protocol      Hypocalcemia  Hypomagnesemia  Hypermagnesemia  Hypokalemia  Vit D normal at 23 and PTH mildly elevated at 99 on 10/27/24  - CTM and correct hypomagnesemia  - CTM calcium, may provide oral replacement once magnesium is corrected  - replete to K>3.5     GERD  - PTA Pantoprazole 40mg     HTN   - Resumed Coreg 6.25 BID,     CAD  Follows with cardiology, last seen 8/2024. CAD s/p PCI with MEGHNA x 1 to LAD (1/11/24) and PCI with MEGHNA x 1 to RCA (2/16/24). Managed PTA with DAPT (ASA 81 mg and clopidogrel 75 mg). He is to be on DAPT x 1 year followed by lifelong ASA 81 mg monotherapy.   - Continue ASA  - Continue Rosuvastatin  - Cardiology consulted     History of diffuse large b-cell lymphoma of esophagus  - diagnosed after GI bleed in 3/24.  Started on weekly rituximab x 4 doses in 4/2024.  Restaging PET after this showed good response with decreased hypermetabolism in distal duodenum.    - held off on further rituximab given plan for vascular surgery for non-healing foot ulcers.    - follows with Dr. Drake ([facility] oncology) and perhaps with a local oncologist as well.  Plan was for repeat PET scan in 3 months, which would be this month but I don't see this scheduled.  Recommend follow-up with  "oncology on discharge.             Diet: Snacks/Supplements Adult: [employer] Bar; Between Meals  Fluid restriction 2000 ML FLUID  Low Saturated Fat Na <2400 mg    DVT Prophylaxis: Held for coronary angiogram on 11/1  [place]aranjo Catheter: Not present  Fluids: None  Lines: None     Cardiac Monitoring: ACTIVE order. Indication: Post- PCI/Angiogram (24 hours)  Code Status: Full Code      Clinically Significant Risk Factors               # Hypoalbuminemia: Lowest albumin = 2.2 g/dL at 10/26/2024  5:33 AM, will monitor as appropriate  # Coagulation Defect: INR = 1.26 (Ref range: 0.85 - 1.15) and/or PTT = 30 Seconds (Ref range: 22 - 38 Seconds), will monitor for bleeding    # Hypertension: Noted on problem list    # Chronic heart failure with reduced ejection fraction: last echo with EF <40%          # Obesity: Estimated body mass index is 30.37 kg/m  as calculated from the following:    Height as of this encounter: 1.384 m (4' 6.5\").    Weight as of this encounter: 58.2 kg (128 lb 4.9 oz).      # Financial/Environmental Concerns: none         Disposition Plan      Expected Discharge Date: 11/04/2024    Discharge Delays: *Medically Ready for Discharge  Placement - TCU  Destination: other (comment) (TCU)  Discharge Comments: TCU unavailable to accept back due to their staffing shortages over the weekend.        The patient's care was discussed with the Attending Physician, Dr. Ellis .    Jonny Sutherland MD  PGY-1  Medicine Service, [address]  Securely message with [employer] (more info)  Text page via [facility] Paging/Directory   See signed in provider for up to date coverage information  ______________________________________________________________________    Interval History   NAEON. Feels like his breathing is at baseline. On RA during the day and requiring 2L NC at night. R BKA pain is well controlled, has not needed any pain meds overnight/today. Diarrhea is " improved, only had 2 BM yesterday and none yet today. Denies chest pain, abd pain, n/v, cough.     Physical Exam   Vital Signs: Temp: 97.5  F (36.4  C) Temp src: Oral BP: 115/69 Pulse: 87   Resp: 17 SpO2: 93 % O2 Device: None (Room air) Oxygen Delivery: 2 LPM  Weight: 128 lbs 4.92 oz    Constitutional: Alert. Sitting in bed comfortably. NAD. Well developed and nourished   Respiratory: On RA. No crackles or wheezes. Normal inspiratory effort.   Cardiovascular: RRR, No murmurs, rubs, or gallops. Normal S1 and S2.   Musculoskeletal: BKA with dressings on both stumps. Dressings appear CDI.   Skin: scattered bruising to BUE, no other bruises, rashes, lesions, no jaundice.     Medical Decision Making       Please see A&P for additional details of medical decision making.      Data   Most Recent 3 CBC's:  Recent Labs   Lab Test 11/02/24  0527 11/01/24  1341 11/01/24  0552   WBC 7.9 9.4 8.4   HGB 9.2* 9.6* 9.7*    100 99    238 228     Most Recent 3 BMP's:  Recent Labs   Lab Test 11/02/24  1315 11/02/24  0841 11/02/24  0527 11/01/24  1912 11/01/24  1341 11/01/24  1028 11/01/24  0552   NA  --   --  137  --  136  --  138   POTASSIUM  --   --  4.8  --  3.9  --  4.4   CHLORIDE  --   --  102  --  103  --  104   CO2  --   --  26  --  25  --  25   BUN  --   --  24.9*  --  26.2*  --  28.1*   CR  --   --  0.74  --  0.64*  --  0.73   ANIONGAP  --   --  9  --  8  --  9   ERIN  --   --  8.2*  --  8.2*  --  8.1*   * 111* 137*   < > 114*   < > 115*    < > = values in this interval not displayed.     Most Recent 2 LFT's:  Recent Labs   Lab Test 10/27/24  0534 10/26/24  1753   AST 45 47*   ALT 24 29   ALKPHOS 110 117   BILITOTAL 0.5 0.4     Most Recent 3 Creatinines:  Recent Labs   Lab Test 11/02/24  0527 11/01/24  1341 11/01/24  0552   CR 0.74 0.64* 0.73     Most Recent 3 Hemoglobins:  Recent Labs   Lab Test 11/02/24  0527 11/01/24  1341 11/01/24  0552   HGB 9.2* 9.6* 9.7*     Most Recent 6 Bacteria Isolates From Any  Culture (See EPIC Reports for Culture Details):  Recent Labs   Lab Test 07/06/21  1101 11/27/20  1430 10/16/20  0939 09/11/20  0818 07/16/20  0856 07/02/20  1042   CULT Heavy growth  Klebsiella pneumoniae  *  Heavy growth  Pseudomonas aeruginosa  * Moderate growth  Pseudomonas aeruginosa  * Heavy growth  Pseudomonas aeruginosa  Some antibiotics have been suppressed due to the known inducible beta lactamase activity.   Please contact Microbiology for more information.  * Heavy growth  Escherichia coli  *  Heavy growth  Enterococcus faecalis  * Moderate growth  Stenotrophomonas maltophilia  * No growth after 6 days  No growth after 6 days     Most Recent 6 glucoses:  Recent Labs   Lab Test 11/02/24  1315 11/02/24  0841 11/02/24  0527 11/02/24  0339 11/02/24  0258 11/01/24  2220   * 111* 137* 145* 146* 185*     Most Recent ESR & CRP:  Recent Labs   Lab Test 11/01/24  2330 10/25/24  0024 08/25/24  1632 07/02/20  1042   SED  --   --  52* 16*   CRP  --   --   --  0.4   CRPI 19.40*   < > 82.30*  --     < > = values in this interval not displayed.     Tacrolimus by Tandem Mass Spectrometry: 7.1

## 2024-11-02 NOTE — PROGRESS NOTES
Care Management Follow Up    Length of Stay (days): 7  Expected Discharge Date: 11/04/2024     Concerns to be Addressed: discharge planning     Patient plan of care discussed at interdisciplinary rounds: No  Anticipated Discharge Disposition: Transitional Care   Anticipated Discharge Services: None  Anticipated Discharge DME: Oxygen    Patient/family educated on Medicare website which has current facility and service quality ratings: yes  Education Provided on the Discharge Plan: Yes  Patient/Family in Agreement with the Plan:      Referrals Placed by CM/SW:    Private pay costs discussed: Not applicable    Discussed  Partnership in Safe Discharge Planning  document with patient/family: No     Handoff Completed: will do at time of discharge     Additional Information:  Weekend SW following for discharge planning back to TCU. Messaged with medical team who will evaluated and update SW about medical stability.     SW met with Aubrey for discussion his morning. He is alert and oriented. He confirms plan for return to TCU, spouse Kyung will provide transport and he feels comfortable getting in/out of her car. He did have some O2 overnight last night but not using O2 during the day and hopeful this continues. Updated him later in day that TCU cannot take him back due to staffing shortages, earliest will be Monday now.     [place] [id number] [phone], (f) [phone]  - 1.25 hours drive time  - O2 supplier is [facility] [employer] [phone]  - confirmed with their RN Santino that his bed is available. They do not have a charge RN today to do readmissions, preference would be Monday if possible but could accommodate Sunday if absolutely necessary (will need to have their director of RN come into facility).   - contacted TCU again as provider confirmed Aubrey is ready for discharge. Now TCU cannot accept back due to staffing issues and will not consider now until Monday.      Next Steps:  [] Confirm TCU bed and faxed updated orders once medically stable for discharge.   [] IMM.  [] External hand off - [facility], [place].        Maria Luisa King, [facility], MSW  11/2/2024  , Casual Staff, Care Management Department   SEARCHABLE in [facility] - search SOCIAL WORK     [place] (0800 - 1630) Saturday and Sunday     Units: 4A Vocera, 4C Vocera, 4E Vocera      Units: 5A [id number] Vocera, 5A 8963-8866 Vocera , BMT SW 1 BMT SW 2, BMT SW 3 & BMT SW 4   5C Off Service 5401 - 5416  5C Off Service [id number]   Units: 6A Vocera, 6B Vocera    Units: 6C Vocera   Units: 7A Vocera, 7B Vocera    Units: 7C Med Surg 7401 thru 7418, 7C Med Surg 7502 thru 7521    Unit: [place] ED Vocera, [place] Obs Vocera     [facility] ([phone]) Saturday and Sunday      Units: 5 Ortho Vocera, 5 Med Surg Vocera, WB ED Vocera   Units: 6 Med Surg Vocera, 8 Med Surg Vocera, 10 ICU Vocera      After hours Vocera [facility] and After Hours Vocera [place]   Please NOTE changes to times below:  **Saturday & Sunday (1630 - 2030)  **Mon-Fri ([phone])   **[facility] Recognized Holidays  ([id number])    Units: ALL   - see above VOCERA links to units

## 2024-11-02 NOTE — PROVIDER NOTIFICATION
Time of notification: 0559 AM  Provider notified: Abdullahi Lin  Notified provider that patient has an allergy to oxycodone and asked if they can remove the order from the MAR.     Response:  Provider stated they will remove the order.

## 2024-11-02 NOTE — PLAN OF CARE
Goal Outcome Evaluation:  Neuro: A&Ox4.   Cardiac: SR, HR 70s. Hypertensive, SBP 120s-150s.   Respiratory: Sating >92% on 2L NC.   GI/: Adequate urine output. No BM. PRN Imodium given x1 per patient request.  Diet/appetite: Tolerating low saturated fat Na<2400mg diet. 2L fluid restriction.  Activity:  Lift assist.  Pain: Mild pain, PRN Tylenol given x2.   Skin: No new deficits noted.  LDA's: R PIV SL    Plan: Continue with POC. Notify primary team with changes.     Plan of Care Reviewed With: patient    Overall Patient Progress: no changeOverall Patient Progress: no change    Outcome Evaluation: Overnight oximetry study done, patient freqently desatting while asleep, placed 2L NC and maintaining O2 saturation>92%.

## 2024-11-03 LAB
GLUCOSE BLDC GLUCOMTR-MCNC: 113 MG/DL (ref 70–99)
GLUCOSE BLDC GLUCOMTR-MCNC: 117 MG/DL (ref 70–99)
GLUCOSE BLDC GLUCOMTR-MCNC: 132 MG/DL (ref 70–99)
GLUCOSE BLDC GLUCOMTR-MCNC: 143 MG/DL (ref 70–99)
GLUCOSE BLDC GLUCOMTR-MCNC: 159 MG/DL (ref 70–99)

## 2024-11-03 PROCEDURE — 94640 AIRWAY INHALATION TREATMENT: CPT | Mod: 76

## 2024-11-03 PROCEDURE — 250N000013 HC RX MED GY IP 250 OP 250 PS 637

## 2024-11-03 PROCEDURE — 93010 ELECTROCARDIOGRAM REPORT: CPT | Performed by: INTERNAL MEDICINE

## 2024-11-03 PROCEDURE — 999N000157 HC STATISTIC RCP TIME EA 10 MIN

## 2024-11-03 PROCEDURE — 99232 SBSQ HOSP IP/OBS MODERATE 35: CPT | Mod: GC | Performed by: STUDENT IN AN ORGANIZED HEALTH CARE EDUCATION/TRAINING PROGRAM

## 2024-11-03 PROCEDURE — 94640 AIRWAY INHALATION TREATMENT: CPT

## 2024-11-03 PROCEDURE — 250N000012 HC RX MED GY IP 250 OP 636 PS 637

## 2024-11-03 PROCEDURE — 93005 ELECTROCARDIOGRAM TRACING: CPT

## 2024-11-03 PROCEDURE — 250N000013 HC RX MED GY IP 250 OP 250 PS 637: Performed by: STUDENT IN AN ORGANIZED HEALTH CARE EDUCATION/TRAINING PROGRAM

## 2024-11-03 PROCEDURE — 99233 SBSQ HOSP IP/OBS HIGH 50: CPT | Mod: 24 | Performed by: INTERNAL MEDICINE

## 2024-11-03 PROCEDURE — 120N000002 HC R&B MED SURG/OB UMMC

## 2024-11-03 PROCEDURE — 250N000012 HC RX MED GY IP 250 OP 636 PS 637: Performed by: INTERNAL MEDICINE

## 2024-11-03 PROCEDURE — 250N000009 HC RX 250: Performed by: STUDENT IN AN ORGANIZED HEALTH CARE EDUCATION/TRAINING PROGRAM

## 2024-11-03 RX ORDER — TACROLIMUS 1 MG/1
2 CAPSULE ORAL
Status: DISCONTINUED | OUTPATIENT
Start: 2024-11-03 | End: 2024-11-05 | Stop reason: HOSPADM

## 2024-11-03 RX ADMIN — CIPROFLOXACIN HYDROCHLORIDE 500 MG: 250 TABLET, FILM COATED ORAL at 08:51

## 2024-11-03 RX ADMIN — LOSARTAN POTASSIUM 50 MG: 50 TABLET, FILM COATED ORAL at 08:52

## 2024-11-03 RX ADMIN — MAGNESIUM OXIDE TAB 400 MG (241.3 MG ELEMENTAL MG) 800 MG: 400 (241.3 MG) TAB at 22:08

## 2024-11-03 RX ADMIN — Medication 2 CAPSULE: at 08:52

## 2024-11-03 RX ADMIN — FLUTICASONE FUROATE AND VILANTEROL TRIFENATATE 1 PUFF: 200; 25 POWDER RESPIRATORY (INHALATION) at 08:58

## 2024-11-03 RX ADMIN — APIXABAN 5 MG: 5 TABLET, FILM COATED ORAL at 08:52

## 2024-11-03 RX ADMIN — DAPSONE 50 MG: 25 TABLET ORAL at 08:52

## 2024-11-03 RX ADMIN — VENLAFAXINE HYDROCHLORIDE 37.5 MG: 37.5 CAPSULE, EXTENDED RELEASE ORAL at 08:51

## 2024-11-03 RX ADMIN — ASPIRIN 81 MG: 81 TABLET ORAL at 09:43

## 2024-11-03 RX ADMIN — TACROLIMUS 2 MG: 1 CAPSULE ORAL at 18:29

## 2024-11-03 RX ADMIN — TACROLIMUS 2 MG: 1 CAPSULE ORAL at 08:51

## 2024-11-03 RX ADMIN — LOPERAMIDE HYDROCHLORIDE 2 MG: 2 CAPSULE ORAL at 08:57

## 2024-11-03 RX ADMIN — INSULIN GLARGINE 6 UNITS: 100 INJECTION, SOLUTION SUBCUTANEOUS at 09:42

## 2024-11-03 RX ADMIN — MONTELUKAST 10 MG: 10 TABLET, FILM COATED ORAL at 20:03

## 2024-11-03 RX ADMIN — INSULIN ASPART 3 UNITS: 100 INJECTION, SOLUTION INTRAVENOUS; SUBCUTANEOUS at 09:44

## 2024-11-03 RX ADMIN — IPRATROPIUM BROMIDE AND ALBUTEROL SULFATE 3 ML: .5; 3 SOLUTION RESPIRATORY (INHALATION) at 12:13

## 2024-11-03 RX ADMIN — SPIRONOLACTONE 12.5 MG: 25 TABLET, FILM COATED ORAL at 08:53

## 2024-11-03 RX ADMIN — FUROSEMIDE 20 MG: 20 TABLET ORAL at 08:53

## 2024-11-03 RX ADMIN — CIPROFLOXACIN HYDROCHLORIDE 500 MG: 250 TABLET, FILM COATED ORAL at 20:03

## 2024-11-03 RX ADMIN — APIXABAN 5 MG: 5 TABLET, FILM COATED ORAL at 20:03

## 2024-11-03 RX ADMIN — IPRATROPIUM BROMIDE AND ALBUTEROL SULFATE 3 ML: .5; 3 SOLUTION RESPIRATORY (INHALATION) at 20:25

## 2024-11-03 RX ADMIN — IPRATROPIUM BROMIDE AND ALBUTEROL SULFATE 3 ML: .5; 3 SOLUTION RESPIRATORY (INHALATION) at 09:02

## 2024-11-03 RX ADMIN — Medication 2 CAPSULE: at 20:03

## 2024-11-03 RX ADMIN — Medication 60 ML: at 09:48

## 2024-11-03 RX ADMIN — PREDNISONE 5 MG: 5 TABLET ORAL at 08:52

## 2024-11-03 RX ADMIN — CARVEDILOL 6.25 MG: 6.25 TABLET, FILM COATED ORAL at 18:30

## 2024-11-03 RX ADMIN — Medication 1 TABLET: at 14:25

## 2024-11-03 RX ADMIN — IPRATROPIUM BROMIDE AND ALBUTEROL SULFATE 3 ML: .5; 3 SOLUTION RESPIRATORY (INHALATION) at 16:23

## 2024-11-03 RX ADMIN — CARVEDILOL 6.25 MG: 6.25 TABLET, FILM COATED ORAL at 08:51

## 2024-11-03 RX ADMIN — PREDNISONE 2.5 MG: 2.5 TABLET ORAL at 20:03

## 2024-11-03 RX ADMIN — PANTOPRAZOLE SODIUM 40 MG: 40 TABLET, DELAYED RELEASE ORAL at 08:50

## 2024-11-03 NOTE — PLAN OF CARE
Hours of Care: [id number]    Neuro: A&Ox4.   Cardiac: SR. VSS.   Respiratory: Sating > 94% on RA.  GI/: Adequate urine output via urinal. No BM this shift.   Diet/appetite: Tolerating low saturated fat low sodium diet and fluid restriction. Eating well.  Activity:  Lift assist, up to chair and in halls. Up in home wheelchair today.   Pain: At acceptable level on current regimen.   Skin: No new deficits noted.  LDA's: R PIV SL    Plan: Continue with POC. Notify primary team with changes.

## 2024-11-03 NOTE — PROGRESS NOTES
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  Recently s/p right BKA in August and left BKA on 9/25/24 and has been at [facility] since surgery in September.  Increased lethargy and confusion 10/24, seen in the [facility] ER with concern for pneumonia and UTI s/p IV fluids and ceftriaxone, transferred to [facility] for sepsis, started on broad spectrum ABX, IV fluids, and stress dose steroids.  The patient was transferred to [facility] on 10/26 for further work up of sepsis and encephalopathy, possible UTI as source.  Also with supratherapeutic tacrolimus level.  Anticipate discharge back to TCU once medically ready.     Today's recommendations:   - Tacro 5.6 (11/2), increased to 2mg BID, Check level Wednesday (or outpatient). (Ordered)  - Consider serial imaging for the lung nodules per transplant ID.  Of note, pt. is due for repeat PET for interval follow up PTLD of duodenum, recommend follow-up with oncology on discharge.  - Diuresis per primary  - Arrange nighttime supplemental oxygen prior to discharge based on 11/1 overnight oximetry (see blow).  - DSA (10/27) pending  - Chronic azithromycin held while on ciprofloxacin, resume once course complete  - CMV weekly monitoring x4 given recent stress dose steroids (pending 11/1)  - PO ciprofloxacin through 11/7 for total 14d course for UTI per transplant ID, QTc monitoring per primary     Acute hypoxic respiratory failure:  Concern for RUL/RML pneumonia:  Encephalopathy, Resolved: Admitted from OSH with sepsis and encephalopathy,  likely UTI as source with possible pneumonia.  Also with supratherapeutic tacrolimus level possibly contributing to encephalopathy, now improved.  CT CAP (10/25) noted interval development of new hazy ill-defined pulmonary nodule involving RUL and RML, interval decrease in prior consolidation posterior RLL with minimal residual atelectasis remaining, minimal atelectasis LLL with stable chronic left basilar pleural fluid, no new lymphadenopathy, unchanged pancreatic cyst, and heterogeneous enhancement to a moderately enlarged prostate.  Tmax 102.8.  CRP, procal, and LA elevated, all now improving.  S/p stress dose steroids (stopped 10/27).  MRSA swab, covid/influenza/RSV, Legionella/Strep pneumoniae (10/25) all negative, respiratory panel (10/26) negative.  Fungitell, A. galactomannan, Fungal Ab, and Histo blood Ag (10/25) all negative.  Histo galactomannan Ag urine (10/26) negative.  Suspect hypoxia in part due to hypervolemia, CXR (10/27) with increased size of cardiac silhouette and increased pulmonary edema.  Repeat CXR (10/28) with decreased pulmonary edema with mild residual edematous and/or atelectatic changes and increased small left pleural effusion.  Echo (10/28) with EF 38%, normal RV, no significant valvular abnormalities.  Cardiology consulted 10/30 given echo findings and cardiac history, see their note for details.  On RA during the day, 1-2L NC overnight (no oxygen use at baseline).    - Consider serial imaging for the lung nodules per transplant ID.  Of note, pt. is due for repeat PET for interval follow up PTLD of duodenum, recommend follow-up with oncology on discharge.  - 10/26 Histo Galacto Ag (-), 10/25 Fungitell (-), 10/25 Fungal Ab (-), Histo Ag (-).  -11/1 room air overnight oximetry with saturation less than 88% for 7 minutes and 52 seconds.  Please provide 2 L nasal cannula oxygen at night and arrange supplemental oxygen for discharge.  - ABX as below per transplant ID  - Diuresis per  "primary  - Nebs: Duoneb QID  - IS and Aerobika q1-2h while awake   - Supplemental oxygen prn to maintain SpO2 >92%.     S/p bilateral sequential lung transplant (BSLT) for A1AT: DSA negative 11/17/23.  Last seen by Dr. Murphy in May and at that time PFTs had improved to recent best (in two years).  EBV (10/27) negative.    - DSA (10/27) pending  - Pulmonary follow up currently scheduled 12/19     Immunosuppression:  - Tacrolimus goal level 8-10. Tacro 5.6 (11/2), increased to 2mg BID, Check level Wednesday (or outpatient). (Ordered)  - Prednisone 5 mg qAM / 2.5 mg qPM  - 2 drug IS due to history of PTLD    Cardiac: H/O PCI with MEGHNA.  Cardiomegaly on admission chest x-ray.  - 10/28 Echocardiogram with: Left ventricular function is decreased. The ejection fraction is 35-40% (moderately reduced). Grade I or early diastolic dysfunction. Moderate diffuse hypokinesis is present.   - Coronary angiogram (11/1) without significant progression of CAD and no evidence of obstructive epicardial disease, only mild ISR.   - Cardiology concludes:  \"the new cardiomyopathy is likely nonischemic in etiology. 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 He should have follow up with his outpatient cardiologist coordinated prior to discharge with plan to repeat echocardiogram before this. In the interim, he will be discharged on low dose beta blocker, ARB, and MRA as previously outlined. Hold on SGLT2i given recent urosepsis.\"      Prophylaxis:   - Dapsone for PJP ppx  - S/p acyclovir for shingles prophylaxis s/p rituximab per chart (started on 4/23, stopped 10/27)     CLAD: PTA azithromycin 250 mg three times/week (for QTc of 460) --> held while on ciprofloxacin, resume once course complete  - Continue Breo (substitute for Advair), and montelukast.     H/o recurrent CMV viremia: Last level (10/25) negative.  Per OP notes, pt. had been on chronic valcyte, but this was stopped secondary to cost.  - CMV weekly " monitoring x4 given recent stress dose steroids. 11/1 CMV (-)     Hypogammaglobulinemia: IgG low although adequate at 552 on 10/27, no indication for IVIG at this time.     Other relevant problems being managed by the primary team:     Probable urosepsis:  Prostatic enhancement see on CT: Admitted with sepsis and encephalopathy as above.  CT CAP findings as above.  Urine culture (10/24) with E. coli and Klebsiella pneumoniae.  Urine culture (10/25) negative.  Transplant ID consulted, see their note for details.   - ABX: PO ciprofloxacin (10/29-11/7) for total 14d course per transplant ID, (signed off 10/29) --> QTc monitoring per primary, s/p additional ceftriaxone (10/27-10/28), minocycline (10/26-10/28, h/o Steno), Zosyn (10/25-10/26), doxycycline (10/25-10/26), and IV vancomycin (10/25-10/27)     Pancytopenia: Possibly secondary to infection, improvement noted on CBC 10/28.  - Management per primary     We appreciate the excellent care provided by the [place] [id number] team.  Recommendations communicated via this note.  Will continue to follow along closely, please do not hesitate to call with any questions or concerns.    Valentino Cristina MD  Contact via Zesty    Note: Chart documentation done in part with Dragon Voice Recognition software. Although reviewed after completion, some word and grammatical errors may remain. Please consider this when interpreting information in this chart.            Interval History:   No events overnight  Dyspnea at rest: None  Dyspnea on exertion: Activity limited by bilat BKA  Cough:  Occ  Sputum: none   Hemoptysis: none   Chest Pain: None           Review of Systems:   C: NEGATIVE for fever, chills  INTEGUMENTARY/SKIN: no rash or obvious new lesions  ENT/MOUTH: no sore throat, new sinus pain or nasal drainage  RESP: see interval history  CV: NEGATIVE for chest pain, palpitations.  GI: no nausea, vomiting, change in stools  : no dysuria  MUSCULOSKELETAL: mild LE ache           Medications:     Current Facility-Administered Medications   Medication Dose Route Frequency Provider Last Rate Last Admin    apixaban ANTICOAGULANT (ELIQUIS) tablet 5 mg  5 mg Oral BID Jovanny Rubio MD   5 mg at 11/02/24 2022    aspirin EC tablet 81 mg  81 mg Oral Daily Jonny Sutherland MD   81 mg at 11/02/24 0931    [Held by provider] azithromycin (ZITHROMAX) tablet 250 mg  250 mg Oral Q Mon Wed Fri AM Jonny Sutherland MD   250 mg at 10/28/24 0751    carvedilol (COREG) tablet 6.25 mg  6.25 mg Oral BID w/meals Jonny Sutherland MD   6.25 mg at 11/02/24 1756    ciprofloxacin (CIPRO) tablet 500 mg  500 mg Oral Q12H [facility] (08/20) Jonny Sutherland MD   500 mg at 11/02/24 2023    dapsone (ACZONE) tablet 50 mg  50 mg Oral Daily Jonny Sutherland MD   50 mg at 11/02/24 0933    fluticasone-vilanterol (BREO ELLIPTA) 200-25 MCG/ACT inhaler 1 puff  1 puff Inhalation Daily Jonny Sutherland MD   1 puff at 11/02/24 0940    furosemide (LASIX) tablet 20 mg  20 mg Oral Daily Jonny Sutherland MD   20 mg at 11/02/24 0933    insulin aspart (NovoLOG) injection (RAPID ACTING)  3 Units Subcutaneous Daily with breakfast Jovanny Rubio MD   3 Units at 11/02/24 0925    insulin aspart (NovoLOG) injection (RAPID ACTING)  1-3 Units Subcutaneous TID AC Jonny Sutherland MD   1 Units at 11/02/24 1754    insulin aspart (NovoLOG) injection (RAPID ACTING)  1-3 Units Subcutaneous At Bedtime Jonny Sutherland MD        insulin aspart (NovoLOG) injection (RAPID ACTING)  3 Units Subcutaneous Daily with lunch Jovanny Ruibo MD   3 Units at 11/02/24 1317    insulin aspart (NovoLOG) injection (RAPID ACTING)  3 Units Subcutaneous Daily with supper Jonny Sutherland MD   3 Units at 11/02/24 1754    insulin glargine (LANTUS PEN) injection 6 Units  6 Units Subcutaneous QAM AC Jovanny Rubio MD   6 Units at 11/02/24 0921    ipratropium - albuterol 0.5 mg/2.5 mg/3 mL (DUONEB) neb solution 3 mL  3 mL Nebulization 4x Daily Nicole Liu MD   3 mL at  11/02/24 2000    loperamide (IMODIUM) capsule 4 mg  4 mg Oral QAM Jovanny Rubio MD   4 mg at 11/01/24 1030    losartan (COZAAR) tablet 50 mg  50 mg Oral Daily Jonny Sutherland MD        magnesium oxide (MAG-OX) tablet 800 mg  800 mg Oral Daily Roberta Ellis MD   800 mg at 11/02/24 2313    montelukast (SINGULAIR) tablet 10 mg  10 mg Oral QPM Jonny Sutherland MD   10 mg at 11/02/24 2022    multivitamin w/minerals (THERA-VIT-M) tablet 1 tablet  1 tablet Per Feeding Tube Daily with lunch Jonny Sutherland MD   1 tablet at 11/02/24 1318    pantoprazole (PROTONIX) EC tablet 40 mg  40 mg Oral Daily Jonny Sutherland MD   40 mg at 11/02/24 0931    predniSONE (DELTASONE) tablet 2.5 mg  2.5 mg Oral QPM Jonny Sutherland MD   2.5 mg at 11/02/24 2023    predniSONE (DELTASONE) tablet 5 mg  5 mg Oral QAM Jonny Sutherland MD   5 mg at 11/02/24 0932    psyllium (METAMUCIL/KONSYL) capsule 2 capsule  2 capsule Oral BID Jonny Sutherland MD   2 capsule at 11/02/24 2022    rosuvastatin (CRESTOR) tablet 20 mg  20 mg Oral Q Mon Wed Fri AM Jonny Sutherland MD   20 mg at 11/01/24 1032    sodium chloride (PF) 0.9% PF flush 3 mL  3 mL Intracatheter Q8H Jonny Sutherland MD   3 mL at 11/02/24 2315    spironolactone (ALDACTONE) half-tab 12.5 mg  12.5 mg Oral Daily Jonny Sutherland MD   12.5 mg at 11/02/24 0933    tacrolimus (GENERIC EQUIVALENT) capsule 1.5 mg  1.5 mg Oral BID IS Ayse Lovell NP   1.5 mg at 11/02/24 1756    venlafaxine (EFFEXOR XR) 24 hr capsule 37.5 mg  37.5 mg Oral Daily Jonny Sutherland MD   37.5 mg at 11/02/24 0931    wound support modular (EXPEDITE) bottle 60 mL  60 mL Oral Daily Brian Iglesias MD   60 mL at 11/02/24 0940     Current Facility-Administered Medications   Medication Dose Route Frequency Provider Last Rate Last Admin    acetaminophen (TYLENOL) tablet 650 mg  650 mg Oral Q4H PRN Jonny Sutherland MD   650 mg at 11/02/24 2040    Or    acetaminophen (TYLENOL) Suppository 650 mg  650 mg Rectal Q4H PRN Ena  MD Jonny        acetaminophen (TYLENOL) tablet 650 mg  650 mg Oral Q4H PRN Jensen Funk MD        albuterol (PROVENTIL HFA/VENTOLIN HFA) inhaler  1-2 puff Inhalation Q6H PRN Jonny Sutherland MD        calcium carbonate (TUMS) chewable tablet 1,000 mg  1,000 mg Oral 4x Daily PRN Jonny Sutherland MD        glucose gel 15-30 g  15-30 g Oral Q15 Min PRN Jonny Sutherland MD        Or    dextrose 50 % injection 25-50 mL  25-50 mL Intravenous Q15 Min PRN Jonny Sutherland MD        Or    glucagon injection 1 mg  1 mg Subcutaneous Q15 Min PRN Jonny Sutherland MD        diclofenac (VOLTAREN) 1 % topical gel 2 g  2 g Topical BID PRN Jonny Sutherland MD        fentaNYL (PF) (SUBLIMAZE) injection 25 mcg  25 mcg Intravenous Q15 Min PRN Jensen Funk MD        HOLD:  Metformin and metformin containing medications if patient received IV contrast with acute kidney injury or severe chronic kidney disease (stage IV or stage V; i.e., eGFR less than 30)   Does not apply HOLD Jensen Funk MD        HYDROmorphone (DILAUDID) half-tab 1 mg  1 mg Oral Q6H PRN Tai Zelaya MD        lidocaine (LMX4) cream   Topical Q1H PRN Jonny Sutherland MD        lidocaine 1 % 0.1-1 mL  0.1-1 mL Other Q1H PRN Jonny Sutherland MD        loperamide (IMODIUM) capsule 2 mg  2 mg Oral TID PRN Jovanny Rubio MD   2 mg at 11/02/24 0934    magnesium hydroxide (MILK OF MAGNESIA) suspension 30 mL  30 mL Oral Daily PRN Jonny Sutherland MD        metoclopramide (REGLAN) tablet 5 mg  5 mg Oral Q6H PRN Jonny Sutherland MD        midazolam (VERSED) injection 0.5 mg  0.5 mg Intravenous Q5 Min PRN Jensen Funk MD        naloxone (NARCAN) injection 0.2 mg  0.2 mg Intravenous Q2 Min PRN Nicole Liu MD        Or    naloxone (NARCAN) injection 0.4 mg  0.4 mg Intravenous Q2 Min PRN Nicole Liu MD        Or    naloxone (NARCAN) injection 0.2 mg  0.2 mg Intramuscular Q2 Min PRN Nicole Liu MD        Or    naloxone (NARCAN) injection 0.4 mg  0.4  mg Intramuscular Q2 Min PRN Nicole Liu MD        ondansetron (ZOFRAN ODT) ODT tab 4 mg  4 mg Oral Q6H PRN Jonny Sutherland MD        Or    ondansetron (ZOFRAN) injection 4 mg  4 mg Intravenous Q6H PRN Jonny Sutherland MD        Patient is already receiving anticoagulation with heparin, enoxaparin (LOVENOX), warfarin (COUMADIN)  or other anticoagulant medication   Does not apply Continuous PRN Jonny Sutherland MD        sodium chloride (OCEAN) 0.65 % nasal spray 1 spray  1 spray Both Nostrils Q1H PRN Sahil Roldan MD   1 spray at 10/30/24 0621    sodium chloride (PF) 0.9% PF flush 10-40 mL  10-40 mL Intracatheter Once PRN Jonny Sutherland MD        sodium chloride (PF) 0.9% PF flush 3 mL  3 mL Intracatheter q1 min prn Jonny Sutherland MD        traMADol (ULTRAM) half-tab 25 mg  25 mg Oral Q4H PRN Jonny Sutherland MD                Physical Exam:   Temp:  [97.5  F (36.4  C)-97.7  F (36.5  C)] 97.6  F (36.4  C)  Pulse:  [81-98] 81  Resp:  [16-18] 16  BP: (112-163)/(69-95) 161/88  SpO2:  [93 %-99 %] 99 %    Intake/Output Summary (Last 24 hours) at 11/3/2024 0741  Last data filed at 11/3/2024 0648  Gross per 24 hour   Intake 960 ml   Output 1750 ml   Net -790 ml     Constitutional:   Awake, alert and in no apparent distress     Eyes:   nonicteric     ENT:    oral mucosa moist without lesions     Neck:   Supple without supraclavicular or cervical lymphadenopathy     Lungs:   Good air flow.  Bibasilar crackles, coarse right, fine left. No rhonchi.  No wheezes.     Cardiovascular:   Normal S1 and S2.  RRR.  No murmur. No gallop. No rub.     Abdomen:   NABS, soft, nondistended, nontender.  No HSM.     Musculoskeletal:   Bilat BKA     Neurologic:   Alert and conversant.     Skin:   Warm, dry.  No rash on limited exam.             Data:   All laboratory and imaging data reviewed.    Results for orders placed or performed during the hospital encounter of 10/26/24 (from the past 24 hours)   Glucose by meter   Result Value  Ref Range    GLUCOSE BY METER POCT 118 (H) 70 - 99 mg/dL   Glucose by meter   Result Value Ref Range    GLUCOSE BY METER POCT 167 (H) 70 - 99 mg/dL   Glucose by meter   Result Value Ref Range    GLUCOSE BY METER POCT 136 (H) 70 - 99 mg/dL   Glucose by meter   Result Value Ref Range    GLUCOSE BY METER POCT 143 (H) 70 - 99 mg/dL   EKG 12-lead, complete   Result Value Ref Range    Systolic Blood Pressure  mmHg    Diastolic Blood Pressure  mmHg    Ventricular Rate 78 BPM    Atrial Rate 78 BPM    DC Interval 168 ms    QRS Duration 86 ms     ms    QTc 492 ms    P Axis 14 degrees    R AXIS -35 degrees    T Axis 142 degrees    Interpretation ECG       Sinus rhythm  Left axis deviation  T wave abnormality, consider lateral ischemia  QTcB >= 480 msec  Abnormal ECG  When compared with ECG of 02-Nov-2024 03:21, (unconfirmed)  No significant change was found       *Note: Due to a large number of results and/or encounters for the requested time period, some results have not been displayed. A complete set of results can be found in Results Review.

## 2024-11-03 NOTE — SUMMARY OF CARE
(Change note type to summary of care)  Transferred from:  6B  Report received from:       Rn Katherine    2 RN skin assessment completed by:      - Findings (add LDA if needed):   Care plan (primary problem) and education initiated if not already done: done  MDRO education done if applicable: n/a  Pt informed about policy regarding no IV pumps off unit: yes  Suction set up in room?yes  Flu shot ordered? (October-April only): n/a  Detailed Belongings: cell phone, glasses, blue duffel bag with grey pants, phone , hearing aids, pepper, wheel chair, slide board, 2 w/c cushions, obdulio socks    4 eyes skin check completed by RN's Sophia- pre-existing wound on L stump, blanchable erythema on sacrum/buttocks, extensive generalized bruising

## 2024-11-03 NOTE — PLAN OF CARE
Goal Outcome Evaluation:  Neuro: A&Ox4.   Cardiac: SR, HR 70s-90s, -140s .   Respiratory: Sating >92% on 1L NC.   GI/: Adequate urine output. BM x1.   Diet/appetite: Tolerating low saturated fat, Na<2400 mg diet with 2L fluid restriction.  Activity:  Lift assist.  Pain: Reports mild pain, PRN Tylenol given x1 with pain relief.   Skin: No new deficits noted.  LDA's: R PIV SL    Plan: Continue with POC. Notify primary team with changes.       Plan of Care Reviewed With: patient    Overall Patient Progress: improvingOverall Patient Progress: improving    Outcome Evaluation: Patient desatting while asleep, on 1L NC and maintaining O2 saturation >92%.

## 2024-11-03 NOTE — PROGRESS NOTES
[facility]    Progress Note - Medicine Service, MAROON TEAM 1       Date of Admission:  10/26/2024    Assessment & Plan   Aubrey Duncan is a 69 year old male. He has CAD, DLBCL of esophagus (s/p rituximab), HLD, DM2, CKD 3b, Bilateral lung transplant 2/2 COPD and Alpha-1-antitrypsin deficiency in 2013, GERD, Anemia, and recent BKA who presents for sepsis. Pt initially presented to [facility] in [place] for lethargy on 10/24/24 transferred to our facility on 10/26 for further workup of shock. By the time of arrival at [facility], shock and encephalopathy had resolved. Transplant pulmonology and transplant ID were consulted. Has completed treatment for UTI. Initiating GDMT for new onset HFrEF. Now stable for discharge to TCU     Today:  - Increased Losartan to 50mg   - He is medically ready for discharge at this point, pending transfer back to TCU ([facility]) on Monday due to staffing issues on their end  - needs azithromycin restarted for ppx once finished course of cipro     Acute hypoxic respiratory failure likely 2/2 HFrEf as below  H/o bilateral lung transplant 2/2 COPD and Alpha-1-antitrypsin deficiency  Complex Sleep Apnea  Transplanted in 2013 for A1AT deficency. 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 Has not had Tacrolimus level checked in >1 month prior to hospitalization. On check was found to be significantly elevated at 39.8. Now in normal range, transplant pulm titrating to goal. Requires 2L NC intermittently (mostly at night), was not on any O2 at home or TCU. Noted to have Complex Sleep Apnea in 2014, was recommended to have further investigation and an adaptive sero-ventilation device but did not pursue this. Overnight oximetry confirms that he needs 2L NC to maintain Sat >92%.   - Wean O2 to maintain O2 sat of  >92%.   - Transplant Pulmonology following  - Continue BID Prednisone (5 and 2.5mg), Dapsone, Montelukast  - Tacrolimus 1.5mg BID (goal 8-10)   - DuoNeb PRN  - Continue Pulmonary toilet     New HFrEF (EF 35-40%)  EF of 55-60% on 1/11/24. Echo from this admission on 10/28/24 showing new EF of 35-40%, moderate diffuse hypokinesis. Difficult to assess WMA due to Afib. No significant valvular abnormalities. EKG on 10/25 without ST changes. Low concern for thyroid dysfunction, PE, ACS, as the cause of this acute presentation. Might be Tacrolimus induced cardiomyopathy vs sepsis. Coronary angiogram on 11/1 without new angiographic lesions to explain drop in EF.   - PTA 20mg PO Lasix  - 2g NA and 2L fluid restriction  - GDMT              - ACEi/ARB: losartan 50 mg daily              - MRA: Spironolactone 12.5mg PO              - BB: PTA Carvedilol 6.25 mg BID              - SGLT2i: Initiate outpt, holding now due to urosepsis  - Cardiology following     Sepsis c/b suspected septic shock   Initially presented on 10/24 septic with fever, tachycardia, leukopenia, and elevated lactate. UA showing pyuria and bacteruria, urine cx not obtained. CT C/A/P showing R upper and middle lobe ill defined nodules concerning for infectious/inflammatory etiology. No abdominal focus of infection. Procalcitonin uptrending from 16 to 37. Negative Bcx and Urine cx. Negative strep pneumo, legionella, COVID, Flu, RSV, MRSA. Developed shock on AM of 10/25 with further course as below. 10/27 urine culture from OSH growing >100K/CFU e coli. IgG low although adequate at 552 on 10/27, no need for IVIG.  - Histo, B-D glucan, Aspergillus negative  - DSA negative  - continue Ciprofloxacin through 11/7 for 14 day course  - Transplant ID consulted.      Septic shock vs Adrenal crisis (resolved)  Became significantly hypotensive AM of 10/25. Thought to be 2/2 septic shock vs acute adrenal insufficiency. Received IVF and stress dose steroids. Lactate peaked  at 7.2 and downtrended. BP has been stable. Lactate, Procalcitonin, and CRP downtrended. Discontinued SDS and BP has remained stable.  - If hypotensive, could consider SDS +/- gentle IVF  - stopped fludracortisone: seems he was on this mostly for hyperkalemia rather than adrenal insufficiency     Pancytopenia - Resolved  h/o normocytic anemia  New pancytopenia with this presentation on top of existing anemia. Baseline Hgb 9-10. Improving since onset. On admission WBC 3.9, Hgb 9.2, and Plt 147. Differential includes marrow suppression from acute infection, DIC, medication effect, and malignancy. Fibrinogen normal. D-dimer mildly elevated at 1.19. Given improvement on abx this was most likely due to infection.   - CTM     h/o Bilateral BKA 2/2 severe PAD and limb ischemia  S/p bilateral BKA (left 9/25 and right 8/28). Noted some wound dehiscence and granulation tissue on L stump over last few weeks but not a significant amount of drainage or purulence.   - WOC consult     Difficult vascular access  Pt has had difficulties with PICC placement by vascular access team. Also has difficulties with PIVs, they flush and draw appropriately however he still develops significant edema in his arms when medications are infusing, as well as severe bruising. Discussed with IR and recommended line (if ultimately needed) would be single lumen cuffed low flow catheter tunneled line into the internal jugular vein. But would not be recommended in the setting of CKD and preservation for possible future dialysis.      History of HIT  History of DVT/PE  - PTA Apixaban 5mg BID    History of CMV Viremia  Last level (10/25) negative. Per OP notes, pt. had been on chronic valcyte, but this was stopped secondary to cost.   - CMV monitoring weekly for 4 weeks given stress dose steroids (next 11/8)   - Negative on 11/1     Reported H/o CKD3b   Recent Cr and eGFR have been in the normal range after bilateral BKA. Current Baseline Cr 0.9-1.0.  Unknown baseline prior. Cystatin C of 1.4 with eGFR of 48.  - CTM      Steroid induced DM  A1c of 4.2% on 8/25/24. Home dose of Lantus 18u in morning and Novolog 5u with breakfast and 3u with lunch/dinner. Given his very low A1c and episodes of asymptomatic hypoglycemia in the hospital on his home doses adjustments have been made. - Lantus 6u in AM   - [id number] with breakfast/lunch/dinner  - MDSSI  - BS check before meals and in morning  - Hypoglycemia protocol      Hypocalcemia  Hypomagnesemia  Hypermagnesemia  Hypokalemia  Vit D normal at 23 and PTH mildly elevated at 99 on 10/27/24  - CTM and correct hypomagnesemia  - CTM calcium, may provide oral replacement once magnesium is corrected  - replete to K>3.5     GERD  - PTA Pantoprazole 40mg     HTN   - Resumed Coreg 6.25 BID,     CAD  Follows with cardiology, last seen 8/2024. CAD s/p PCI with MEGHNA x 1 to LAD (1/11/24) and PCI with MEGHNA x 1 to RCA (2/16/24). Managed PTA with DAPT (ASA 81 mg and clopidogrel 75 mg). He is to be on DAPT x 1 year followed by lifelong ASA 81 mg monotherapy.   - Continue ASA  - Continue Rosuvastatin  - Cardiology consulted     History of diffuse large b-cell lymphoma of esophagus  - diagnosed after GI bleed in 3/24.  Started on weekly rituximab x 4 doses in 4/2024.  Restaging PET after this showed good response with decreased hypermetabolism in distal duodenum.    - held off on further rituximab given plan for vascular surgery for non-healing foot ulcers.    - follows with Dr. Drake ([facility] oncology) and perhaps with a local oncologist as well.  Plan was for repeat PET scan in 3 months, which would be this month but I don't see this scheduled.  Recommend follow-up with oncology on discharge.          Diet: Snacks/Supplements Adult: Special K Bar; Between Meals  Fluid restriction 2000 ML FLUID  Low Saturated Fat Na <2400 mg    DVT Prophylaxis: Held for coronary angiogram on 11/1  Naranjo Catheter: Not present  Fluids: None  Lines: None    "  Cardiac Monitoring: ACTIVE order. Indication: Post- PCI/Angiogram (24 hours)  Code Status: Full Code      Clinically Significant Risk Factors               # Hypoalbuminemia: Lowest albumin = 2.2 g/dL at 10/26/2024  5:33 AM, will monitor as appropriate  # Coagulation Defect: INR = 1.26 (Ref range: 0.85 - 1.15) and/or PTT = 30 Seconds (Ref range: 22 - 38 Seconds), will monitor for bleeding    # Hypertension: Noted on problem list    # Chronic heart failure with reduced ejection fraction: last echo with EF <40%          # Obesity: Estimated body mass index is 30.74 kg/m  as calculated from the following:    Height as of this encounter: 1.384 m (4' 6.5\").    Weight as of this encounter: 58.9 kg (129 lb 13.6 oz).      # Financial/Environmental Concerns: none         Disposition Plan     Expected Discharge Date: 11/04/2024    Discharge Delays: *Medically Ready for Discharge  Placement - TCU  Destination: other (comment) (TCU)  Discharge Comments: TCU unavailable to accept back due to their staffing shortages over the weekend.        The patient's care was discussed with the Attending Physician, Dr. Ellis .    Jovanny Rubio MD  Internal Medicine, PGY2  Medicine Service, [facility] TEAM [address] [place] [address]  Securely message with [employer] (more info)  Text page via [employer] Paging/Directory   See signed in provider for up to date coverage information  ______________________________________________________________________    Interval History   NAEON. Feeling about the same this AM. Looking forward to getting back to his TCU when able.     Physical Exam   Vital Signs: Temp: 97.6  F (36.4  C) Temp src: Oral BP: (!) 161/88 Pulse: 81   Resp: 16 SpO2: 99 % O2 Device: Nasal cannula Oxygen Delivery: 1 LPM  Weight: 129 lbs 13.62 oz    Constitutional: Alert. Sitting in bed comfortably. NAD. Well developed and nourished   Respiratory: On RA. No crackles or wheezes. Normal inspiratory effort. "   Cardiovascular: RRR, No murmurs, rubs, or gallops. Normal S1 and S2.   Musculoskeletal: BKA with dressings on both stumps. Dressings appear CDI.   Skin: scattered bruising to BUE, no other bruises, rashes, lesions, no jaundice.     Medical Decision Making       Please see A&P for additional details of medical decision making.      Data   Most Recent 3 CBC's:  Recent Labs   Lab Test 11/02/24 0527 11/01/24  1341 11/01/24  0552   WBC 7.9 9.4 8.4   HGB 9.2* 9.6* 9.7*    100 99    238 228     Most Recent 3 BMP's:  Recent Labs   Lab Test 11/03/24  0348 11/02/24  2211 11/02/24  1743 11/02/24  0841 11/02/24 0527 11/01/24  1912 11/01/24  1341 11/01/24  1028 11/01/24  0552   NA  --   --   --   --  137  --  136  --  138   POTASSIUM  --   --   --   --  4.8  --  3.9  --  4.4   CHLORIDE  --   --   --   --  102  --  103  --  104   CO2  --   --   --   --  26  --  25  --  25   BUN  --   --   --   --  24.9*  --  26.2*  --  28.1*   CR  --   --   --   --  0.74  --  0.64*  --  0.73   ANIONGAP  --   --   --   --  9  --  8  --  9   ERIN  --   --   --   --  8.2*  --  8.2*  --  8.1*   * 136* 167*   < > 137*   < > 114*   < > 115*    < > = values in this interval not displayed.     Most Recent 2 LFT's:  Recent Labs   Lab Test 10/27/24  0534 10/26/24  1753   AST 45 47*   ALT 24 29   ALKPHOS 110 117   BILITOTAL 0.5 0.4     Most Recent 3 Creatinines:  Recent Labs   Lab Test 11/02/24 0527 11/01/24  1341 11/01/24  0552   CR 0.74 0.64* 0.73     Most Recent 3 Hemoglobins:  Recent Labs   Lab Test 11/02/24 0527 11/01/24  1341 11/01/24  0552   HGB 9.2* 9.6* 9.7*     Most Recent 6 Bacteria Isolates From Any Culture (See EPIC Reports for Culture Details):  Recent Labs   Lab Test 07/06/21  1101 11/27/20  1430 10/16/20  0939 09/11/20  0818 07/16/20  0856 07/02/20  1042   CULT Heavy growth  Klebsiella pneumoniae  *  Heavy growth  Pseudomonas aeruginosa  * Moderate growth  Pseudomonas aeruginosa  * Heavy growth  Pseudomonas  aeruginosa  Some antibiotics have been suppressed due to the known inducible beta lactamase activity.   Please contact Microbiology for more information.  * Heavy growth  Escherichia coli  *  Heavy growth  Enterococcus faecalis  * Moderate growth  Stenotrophomonas maltophilia  * No growth after 6 days  No growth after 6 days     Most Recent 6 glucoses:  Recent Labs   Lab Test 11/03/24  0348 11/02/24  2211 11/02/24  1743 11/02/24  1315 11/02/24  0841 11/02/24  0527   * 136* 167* 118* 111* 137*     Most Recent ESR & CRP:  Recent Labs   Lab Test 11/01/24  2330 10/25/24  0024 08/25/24  1632 07/02/20  1042   SED  --   --  52* 16*   CRP  --   --   --  0.4   CRPI 19.40*   < > 82.30*  --     < > = values in this interval not displayed.     Tacrolimus by Tandem Mass Spectrometry: 7.1

## 2024-11-04 ENCOUNTER — APPOINTMENT (OUTPATIENT)
Dept: OCCUPATIONAL THERAPY | Facility: CLINIC | Age: 69
DRG: 871 | End: 2024-11-04
Attending: STUDENT IN AN ORGANIZED HEALTH CARE EDUCATION/TRAINING PROGRAM
Payer: MEDICARE

## 2024-11-04 LAB
ATRIAL RATE - MUSE: 75 BPM
ATRIAL RATE - MUSE: 78 BPM
ATRIAL RATE - MUSE: 85 BPM
ATRIAL RATE - MUSE: 87 BPM
DIASTOLIC BLOOD PRESSURE - MUSE: NORMAL MMHG
GLUCOSE BLDC GLUCOMTR-MCNC: 107 MG/DL (ref 70–99)
GLUCOSE BLDC GLUCOMTR-MCNC: 115 MG/DL (ref 70–99)
GLUCOSE BLDC GLUCOMTR-MCNC: 117 MG/DL (ref 70–99)
GLUCOSE BLDC GLUCOMTR-MCNC: 131 MG/DL (ref 70–99)
GLUCOSE BLDC GLUCOMTR-MCNC: 177 MG/DL (ref 70–99)
INTERPRETATION ECG - MUSE: NORMAL
P AXIS - MUSE: 10 DEGREES
P AXIS - MUSE: 14 DEGREES
P AXIS - MUSE: 15 DEGREES
P AXIS - MUSE: 21 DEGREES
PR INTERVAL - MUSE: 156 MS
PR INTERVAL - MUSE: 164 MS
PR INTERVAL - MUSE: 168 MS
PR INTERVAL - MUSE: 168 MS
QRS DURATION - MUSE: 82 MS
QRS DURATION - MUSE: 84 MS
QRS DURATION - MUSE: 84 MS
QRS DURATION - MUSE: 86 MS
QT - MUSE: 410 MS
QT - MUSE: 422 MS
QT - MUSE: 432 MS
QT - MUSE: 452 MS
QTC - MUSE: 492 MS
QTC - MUSE: 493 MS
QTC - MUSE: 502 MS
QTC - MUSE: 504 MS
R AXIS - MUSE: -25 DEGREES
R AXIS - MUSE: -27 DEGREES
R AXIS - MUSE: -33 DEGREES
R AXIS - MUSE: -35 DEGREES
SYSTOLIC BLOOD PRESSURE - MUSE: NORMAL MMHG
T AXIS - MUSE: 133 DEGREES
T AXIS - MUSE: 142 DEGREES
T AXIS - MUSE: 144 DEGREES
T AXIS - MUSE: 157 DEGREES
VENTRICULAR RATE- MUSE: 75 BPM
VENTRICULAR RATE- MUSE: 78 BPM
VENTRICULAR RATE- MUSE: 85 BPM
VENTRICULAR RATE- MUSE: 87 BPM

## 2024-11-04 PROCEDURE — 94640 AIRWAY INHALATION TREATMENT: CPT

## 2024-11-04 PROCEDURE — 999N000157 HC STATISTIC RCP TIME EA 10 MIN

## 2024-11-04 PROCEDURE — 99232 SBSQ HOSP IP/OBS MODERATE 35: CPT | Mod: 24 | Performed by: PHYSICIAN ASSISTANT

## 2024-11-04 PROCEDURE — 99232 SBSQ HOSP IP/OBS MODERATE 35: CPT | Mod: GC | Performed by: STUDENT IN AN ORGANIZED HEALTH CARE EDUCATION/TRAINING PROGRAM

## 2024-11-04 PROCEDURE — 250N000013 HC RX MED GY IP 250 OP 250 PS 637

## 2024-11-04 PROCEDURE — 250N000013 HC RX MED GY IP 250 OP 250 PS 637: Performed by: STUDENT IN AN ORGANIZED HEALTH CARE EDUCATION/TRAINING PROGRAM

## 2024-11-04 PROCEDURE — 120N000002 HC R&B MED SURG/OB UMMC

## 2024-11-04 PROCEDURE — 250N000012 HC RX MED GY IP 250 OP 636 PS 637

## 2024-11-04 PROCEDURE — 94640 AIRWAY INHALATION TREATMENT: CPT | Mod: 76

## 2024-11-04 PROCEDURE — 250N000009 HC RX 250: Performed by: STUDENT IN AN ORGANIZED HEALTH CARE EDUCATION/TRAINING PROGRAM

## 2024-11-04 PROCEDURE — 93005 ELECTROCARDIOGRAM TRACING: CPT

## 2024-11-04 PROCEDURE — 97530 THERAPEUTIC ACTIVITIES: CPT | Mod: GO | Performed by: OCCUPATIONAL THERAPIST

## 2024-11-04 PROCEDURE — 93010 ELECTROCARDIOGRAM REPORT: CPT | Performed by: INTERNAL MEDICINE

## 2024-11-04 PROCEDURE — 250N000012 HC RX MED GY IP 250 OP 636 PS 637: Performed by: INTERNAL MEDICINE

## 2024-11-04 RX ADMIN — PANTOPRAZOLE SODIUM 40 MG: 40 TABLET, DELAYED RELEASE ORAL at 09:04

## 2024-11-04 RX ADMIN — ASPIRIN 81 MG: 81 TABLET ORAL at 09:06

## 2024-11-04 RX ADMIN — Medication 2 CAPSULE: at 20:33

## 2024-11-04 RX ADMIN — TACROLIMUS 2 MG: 1 CAPSULE ORAL at 09:05

## 2024-11-04 RX ADMIN — DAPSONE 50 MG: 25 TABLET ORAL at 09:06

## 2024-11-04 RX ADMIN — Medication 2 CAPSULE: at 09:06

## 2024-11-04 RX ADMIN — PREDNISONE 2.5 MG: 2.5 TABLET ORAL at 20:34

## 2024-11-04 RX ADMIN — Medication 60 ML: at 10:03

## 2024-11-04 RX ADMIN — LOSARTAN POTASSIUM 50 MG: 50 TABLET, FILM COATED ORAL at 09:06

## 2024-11-04 RX ADMIN — FUROSEMIDE 20 MG: 20 TABLET ORAL at 09:06

## 2024-11-04 RX ADMIN — APIXABAN 5 MG: 5 TABLET, FILM COATED ORAL at 09:06

## 2024-11-04 RX ADMIN — TACROLIMUS 2 MG: 1 CAPSULE ORAL at 18:03

## 2024-11-04 RX ADMIN — PREDNISONE 5 MG: 5 TABLET ORAL at 09:06

## 2024-11-04 RX ADMIN — APIXABAN 5 MG: 5 TABLET, FILM COATED ORAL at 20:35

## 2024-11-04 RX ADMIN — LOPERAMIDE HYDROCHLORIDE 2 MG: 2 CAPSULE ORAL at 18:08

## 2024-11-04 RX ADMIN — Medication 1 TABLET: at 12:50

## 2024-11-04 RX ADMIN — MONTELUKAST 10 MG: 10 TABLET, FILM COATED ORAL at 20:34

## 2024-11-04 RX ADMIN — IPRATROPIUM BROMIDE AND ALBUTEROL SULFATE 3 ML: .5; 3 SOLUTION RESPIRATORY (INHALATION) at 17:20

## 2024-11-04 RX ADMIN — CARVEDILOL 6.25 MG: 6.25 TABLET, FILM COATED ORAL at 09:05

## 2024-11-04 RX ADMIN — ROSUVASTATIN CALCIUM 20 MG: 20 TABLET, FILM COATED ORAL at 09:06

## 2024-11-04 RX ADMIN — IPRATROPIUM BROMIDE AND ALBUTEROL SULFATE 3 ML: .5; 3 SOLUTION RESPIRATORY (INHALATION) at 12:38

## 2024-11-04 RX ADMIN — MAGNESIUM OXIDE TAB 400 MG (241.3 MG ELEMENTAL MG) 800 MG: 400 (241.3 MG) TAB at 22:43

## 2024-11-04 RX ADMIN — IPRATROPIUM BROMIDE AND ALBUTEROL SULFATE 3 ML: .5; 3 SOLUTION RESPIRATORY (INHALATION) at 21:06

## 2024-11-04 RX ADMIN — VENLAFAXINE HYDROCHLORIDE 37.5 MG: 37.5 CAPSULE, EXTENDED RELEASE ORAL at 09:04

## 2024-11-04 RX ADMIN — FLUTICASONE FUROATE AND VILANTEROL TRIFENATATE 1 PUFF: 200; 25 POWDER RESPIRATORY (INHALATION) at 09:09

## 2024-11-04 RX ADMIN — INSULIN GLARGINE 6 UNITS: 100 INJECTION, SOLUTION SUBCUTANEOUS at 09:14

## 2024-11-04 RX ADMIN — CARVEDILOL 6.25 MG: 6.25 TABLET, FILM COATED ORAL at 18:03

## 2024-11-04 RX ADMIN — IPRATROPIUM BROMIDE AND ALBUTEROL SULFATE 3 ML: .5; 3 SOLUTION RESPIRATORY (INHALATION) at 08:32

## 2024-11-04 RX ADMIN — LOPERAMIDE HYDROCHLORIDE 4 MG: 2 CAPSULE ORAL at 09:05

## 2024-11-04 RX ADMIN — INSULIN ASPART 3 UNITS: 100 INJECTION, SOLUTION INTRAVENOUS; SUBCUTANEOUS at 10:03

## 2024-11-04 RX ADMIN — CIPROFLOXACIN HYDROCHLORIDE 500 MG: 250 TABLET, FILM COATED ORAL at 20:34

## 2024-11-04 RX ADMIN — SPIRONOLACTONE 12.5 MG: 25 TABLET, FILM COATED ORAL at 09:05

## 2024-11-04 RX ADMIN — CIPROFLOXACIN HYDROCHLORIDE 500 MG: 250 TABLET, FILM COATED ORAL at 09:06

## 2024-11-04 RX ADMIN — ACETAMINOPHEN 650 MG: 325 TABLET, FILM COATED ORAL at 00:50

## 2024-11-04 NOTE — PLAN OF CARE
Goal Outcome Evaluation:      Shift Hours: 0700 - 1500    Assessment/Interventions:  Assessments were at patient's baseline   L BKA wound done during shift.    Activity  Fall Risk Score: 55  Bed alarm on? Yes  Mobility: Not OOB  Pain: Intermittent pain to L amputation site, Denies need for PRN tylenol.   Labs/RN Managed Protocols: ACHS BS stable.   Lines/Drains: RPIV   Nutrition: Complaint with low Na diet and fluid restriction. Good appetite during shift.     Goal Outcome Evaluation/Barriers to Discharge: Possible discharge to TCU tomorrow.

## 2024-11-04 NOTE — PLAN OF CARE
Transfer  Transferred to: 7C  Via: wheelchair  Reason for transfer:Pt no longer appropriate for 6B- improved patient condition  Family: Aware of transfer  Belongings: Packed and sent with pt  Chart: Delivered with pt to next unit  Medications: Meds sent to new unit with pt  Report given to: FLORENCIO RN    Hours of Care: [id number]     Neuro: A&Ox4.   Cardiac: SR. VSS.       Respiratory: Sating > 94% on RA.  GI/: Adequate urine output via urinal. BM x1 this shift.   Diet/appetite: Tolerating low saturated fat low sodium diet and fluid restriction. Eating well.  Activity:  Lift assist, up to chair and in halls. Up in home wheelchair today.   Pain: At acceptable level on current regimen.   Skin: No new deficits noted.  LDA's: R PIV SL     Plan: Return to TCU. Continue with POC. Notify primary team with changes.                room air

## 2024-11-04 NOTE — PLAN OF CARE
Goal Outcome Evaluation:    Pt A&Ox4, VSS on RA, no supplemental O2 overnight. Pt using urinal at bedside. Bilateral BKA, prn tylenol for pain on L stump. Wound care completed on L stump and more wound care supplies ordered.

## 2024-11-04 NOTE — PROGRESS NOTES
Care Management Follow Up    Length of Stay (days): 9    Expected Discharge Date: 11/05/2024     Concerns to be Addressed: discharge planning     Patient plan of care discussed at interdisciplinary rounds: Yes    Anticipated Discharge Disposition: Transitional Care     [facility]  [address] [place] [address]  [phone], (f) [phone]  - 1.25 hours drive time  - O2 supplier is [employer] [phone]     Anticipated Discharge Services: None  Anticipated Discharge DME: [employer]  Patient/family educated on Medicare website which has current facility and service quality ratings: yes  Education Provided on the Discharge Plan: Yes  Patient/Family in Agreement with the Plan:    Referrals Placed by CM/SW:  none  Private pay costs discussed: Not applicable    Discussed  Partnership in Safe Discharge Planning  document with patient/family: No     Handoff Completed: No, handoff not indicated or clinically appropriate    Additional Information:  Social work contacted patient's TCU, [facility], and spoke with admissions.  Per TCU, facility is still understaffed and cannot accept patient back until tomorrow, Tuesday, 11/5.  SW updated [facility] Provider, Dr. Rubio, with this update and will inform patient. Per provider, patient remains medically ready for discharge.     SW spoke with patient at bedside.  Patient expressed frustration with TCU, as he is currently paying for his bedhold there but voiced appreciation for hospital .  Patient requested SW call his wife Kyung with update.  Per patient, Kyung will drive down tomorrow from [place] to provide discharge ride.     SW called Kyung and confirmed that SW will call the TCU first thing in the morning and follow up with Kyung to confirm that she should drive down.    Next Steps:   SW will follow up with TCU facility tomorrow to confirm they are fully staffed to accept patient.  SW will alert patient's wife regarding  discharge ride.  - IMM needed.    __________________________     ROBERTO Garcia, PENNIESW  , [facility]  7C Medical Surgical Beds 8683-2466   Desk Phone: [phone]   Securely message with [employer] (Search Name or  Med Surg [phone] SW)  Text page via [employer] Paging/Directory   See signed in provider for up to date coverage information  Social Work & Care Management Department  ___________________________________    Unit 7C Care Management Weekend Contacts:    Searchable in [employer] - search SOCIAL WORK or CARE COORDINATOR  Searchable in [employer] - search 7C Med Surg SW, 7C Med Surg RNCC     7C Weekend SW Vocera; ([phone])  7C Weekend RNCC Vocera; ([phone])  After hours  Vocera;  (Weekends (1630 - 0000); Mon-Fri ([phone]); [facility] Recognized Holidays  ([phone]))

## 2024-11-05 VITALS
HEART RATE: 90 BPM | BODY MASS INDEX: 24.67 KG/M2 | OXYGEN SATURATION: 96 % | TEMPERATURE: 98 F | WEIGHT: 125.66 LBS | RESPIRATION RATE: 16 BRPM | DIASTOLIC BLOOD PRESSURE: 73 MMHG | SYSTOLIC BLOOD PRESSURE: 137 MMHG | HEIGHT: 60 IN

## 2024-11-05 PROBLEM — E46 MALNUTRITION (H): Status: ACTIVE | Noted: 2024-11-05

## 2024-11-05 PROBLEM — I50.21 ACUTE SYSTOLIC CONGESTIVE HEART FAILURE (H): Status: ACTIVE | Noted: 2024-11-05

## 2024-11-05 PROBLEM — N39.0 URINARY TRACT INFECTION: Status: ACTIVE | Noted: 2024-11-05

## 2024-11-05 PROBLEM — E83.42 HYPOMAGNESEMIA: Status: ACTIVE | Noted: 2024-11-05

## 2024-11-05 LAB
ATRIAL RATE - MUSE: 85 BPM
DIASTOLIC BLOOD PRESSURE - MUSE: NORMAL MMHG
GLUCOSE BLDC GLUCOMTR-MCNC: 133 MG/DL (ref 70–99)
GLUCOSE BLDC GLUCOMTR-MCNC: 139 MG/DL (ref 70–99)
GLUCOSE BLDC GLUCOMTR-MCNC: 158 MG/DL (ref 70–99)
INTERPRETATION ECG - MUSE: NORMAL
P AXIS - MUSE: 7 DEGREES
PR INTERVAL - MUSE: 162 MS
QRS DURATION - MUSE: 84 MS
QT - MUSE: 428 MS
QTC - MUSE: 509 MS
R AXIS - MUSE: -30 DEGREES
SYSTOLIC BLOOD PRESSURE - MUSE: NORMAL MMHG
T AXIS - MUSE: 118 DEGREES
VENTRICULAR RATE- MUSE: 85 BPM

## 2024-11-05 PROCEDURE — 999N000157 HC STATISTIC RCP TIME EA 10 MIN

## 2024-11-05 PROCEDURE — 250N000013 HC RX MED GY IP 250 OP 250 PS 637

## 2024-11-05 PROCEDURE — 93010 ELECTROCARDIOGRAM REPORT: CPT | Performed by: INTERNAL MEDICINE

## 2024-11-05 PROCEDURE — 94640 AIRWAY INHALATION TREATMENT: CPT

## 2024-11-05 PROCEDURE — 250N000012 HC RX MED GY IP 250 OP 636 PS 637: Performed by: INTERNAL MEDICINE

## 2024-11-05 PROCEDURE — 93005 ELECTROCARDIOGRAM TRACING: CPT

## 2024-11-05 PROCEDURE — 99238 HOSP IP/OBS DSCHRG MGMT 30/<: CPT | Mod: GC | Performed by: STUDENT IN AN ORGANIZED HEALTH CARE EDUCATION/TRAINING PROGRAM

## 2024-11-05 PROCEDURE — 250N000009 HC RX 250: Performed by: STUDENT IN AN ORGANIZED HEALTH CARE EDUCATION/TRAINING PROGRAM

## 2024-11-05 PROCEDURE — 250N000012 HC RX MED GY IP 250 OP 636 PS 637

## 2024-11-05 RX ORDER — MAGNESIUM OXIDE 400 MG/1
800 TABLET ORAL DAILY
DISCHARGE
Start: 2024-11-05

## 2024-11-05 RX ORDER — CIPROFLOXACIN 500 MG/1
500 TABLET, FILM COATED ORAL EVERY 12 HOURS
DISCHARGE
Start: 2024-11-05 | End: 2024-11-10

## 2024-11-05 RX ORDER — LOSARTAN POTASSIUM 50 MG/1
50 TABLET ORAL DAILY
DISCHARGE
Start: 2024-11-05

## 2024-11-05 RX ORDER — AMOXICILLIN 250 MG
1 CAPSULE ORAL 2 TIMES DAILY PRN
DISCHARGE
Start: 2024-11-05

## 2024-11-05 RX ORDER — TACROLIMUS 1 MG/1
2 CAPSULE ORAL 2 TIMES DAILY
DISCHARGE
Start: 2024-11-05 | End: 2024-11-22

## 2024-11-05 RX ORDER — INSULIN ASPART 100 [IU]/ML
3 INJECTION, SOLUTION INTRAVENOUS; SUBCUTANEOUS
DISCHARGE
Start: 2024-11-05

## 2024-11-05 RX ORDER — MULTIPLE VITAMINS W/ MINERALS TAB 9MG-400MCG
1 TAB ORAL
DISCHARGE
Start: 2024-11-05

## 2024-11-05 RX ORDER — SPIRONOLACTONE 25 MG/1
12.5 TABLET ORAL DAILY
DISCHARGE
Start: 2024-11-05

## 2024-11-05 RX ADMIN — CARVEDILOL 6.25 MG: 6.25 TABLET, FILM COATED ORAL at 08:40

## 2024-11-05 RX ADMIN — IPRATROPIUM BROMIDE AND ALBUTEROL SULFATE 3 ML: .5; 3 SOLUTION RESPIRATORY (INHALATION) at 09:04

## 2024-11-05 RX ADMIN — APIXABAN 5 MG: 5 TABLET, FILM COATED ORAL at 08:39

## 2024-11-05 RX ADMIN — PANTOPRAZOLE SODIUM 40 MG: 40 TABLET, DELAYED RELEASE ORAL at 08:40

## 2024-11-05 RX ADMIN — Medication 2 CAPSULE: at 08:40

## 2024-11-05 RX ADMIN — LOPERAMIDE HYDROCHLORIDE 4 MG: 2 CAPSULE ORAL at 08:40

## 2024-11-05 RX ADMIN — ASPIRIN 81 MG: 81 TABLET ORAL at 08:40

## 2024-11-05 RX ADMIN — VENLAFAXINE HYDROCHLORIDE 37.5 MG: 37.5 CAPSULE, EXTENDED RELEASE ORAL at 08:40

## 2024-11-05 RX ADMIN — Medication 60 ML: at 09:50

## 2024-11-05 RX ADMIN — TACROLIMUS 2 MG: 1 CAPSULE ORAL at 08:40

## 2024-11-05 RX ADMIN — FLUTICASONE FUROATE AND VILANTEROL TRIFENATATE 1 PUFF: 200; 25 POWDER RESPIRATORY (INHALATION) at 08:41

## 2024-11-05 RX ADMIN — INSULIN ASPART 3 UNITS: 100 INJECTION, SOLUTION INTRAVENOUS; SUBCUTANEOUS at 09:26

## 2024-11-05 RX ADMIN — DAPSONE 50 MG: 25 TABLET ORAL at 08:39

## 2024-11-05 RX ADMIN — PREDNISONE 5 MG: 5 TABLET ORAL at 08:39

## 2024-11-05 RX ADMIN — SPIRONOLACTONE 12.5 MG: 25 TABLET, FILM COATED ORAL at 08:41

## 2024-11-05 RX ADMIN — CIPROFLOXACIN HYDROCHLORIDE 500 MG: 250 TABLET, FILM COATED ORAL at 08:40

## 2024-11-05 RX ADMIN — INSULIN GLARGINE 6 UNITS: 100 INJECTION, SOLUTION SUBCUTANEOUS at 09:26

## 2024-11-05 RX ADMIN — LOSARTAN POTASSIUM 50 MG: 50 TABLET, FILM COATED ORAL at 08:40

## 2024-11-05 RX ADMIN — FUROSEMIDE 20 MG: 20 TABLET ORAL at 08:40

## 2024-11-05 ASSESSMENT — ACTIVITIES OF DAILY LIVING (ADL)
ADLS_ACUITY_SCORE: 0

## 2024-11-05 NOTE — PROGRESS NOTES
Care Management Discharge Note    Discharge Date: 11/05/2024    Discharge Disposition: Transitional Care    [facility]  [address] [place] [address]  [phone], (f) [phone]  - 1.25 hours drive time  - O2 supplier is [employer] [phone]    Discharge Services: None    Discharge DME: Oxygen    Discharge Transportation:  Patient's spouse, Kyung, will provide discharge ride    Private pay costs discussed: Not applicable    Does the patient's insurance plan have a 3 day qualifying hospital stay waiver?  No    PAS Confirmation Code: not applicable, TCU return on a bed hold  Patient/family educated on Medicare website which has current facility and service quality ratings: yes  Education Provided on the Discharge Plan: Yes  Persons Notified of Discharge Plans: patient, patient's spouse, Kyung, Provider, charge nurse, TCU staff  Patient/Family in Agreement with the Plan:  Yes    Handoff Referral Completed: Yes, [facility] PCP: Internal handoff referral completed    Additional Information:   called [facility] TCU and confirmed with Jonna in admissions that patient can be accepted back to TCU today under his bed hold. Jonna reported she would like patient there by 2pm.   updated patient who was happy with this news and ELIZABETH then called patient's wife, Kyung, who should be at hospital by 10/10:30am to provide discharge ride.     ELIZABETH updated charge nurse and provider of discharge. ELIZABETH requested SHELBI Mccain complete IMM. PAS not needed, as patient is returning to prior facility. Discharge orders sent to TCU and patient safely seen off the floor with wife, Kyung.    __________________________     ROBERTO Garcia, LGSW  , [facility]  7C Medical Surgical Beds 5336-0789   Desk Phone: [phone]   Securely message with [employer] (Search Name or 7C Med Surg [phone] )  Text page via [employer]  Paging/Directory   See signed in provider for up to date coverage information  Social Work & Care Management Department  ___________________________________    Unit 7C Care Management Weekend Contacts:    Searchable in [employer] - search SOCIAL WORK or CARE COORDINATOR  Searchable in [employer] - search 7C Med Surg SW, 7C Med Surg RNCC     7C Weekend SW Vocera; ([phone])  7C Weekend RNCC Vocera; ([phone])  After hours  Vocera;  (Weekends (1630 - 0000); Mon-Fri (2430-0549); [facility] Recognized Holidays  ([phone]))

## 2024-11-05 NOTE — PROGRESS NOTES
[facility]    Progress Note - Medicine Service, MAROON TEAM 1       Date of Admission:  10/26/2024    Assessment & Plan   Aubrey Duncan is a 69 year old male. He has CAD, DLBCL of esophagus (s/p rituximab), HLD, DM2, CKD 3b, Bilateral lung transplant 2/2 COPD and Alpha-1-antitrypsin deficiency in 2013, GERD, Anemia, and recent BKA who presents for sepsis. Pt initially presented to [facility] in [place] for lethargy on 10/24/24 transferred to our facility on 10/26 for further workup of shock. By the time of arrival at [facility], shock and encephalopathy had resolved. Transplant pulmonology and transplant ID were consulted. Has completed treatment for UTI. Initiating GDMT for new onset HFrEF. Now stable for discharge to TCU     Today:  - He is medically ready for discharge at this point, pending transfer back to TCU ([facility]) Tomorrow due to staffing issues on their end  - 2L of O2 at night based on overnight oximetry studies  - needs azithromycin restarted for ppx once finished course of cipro (11/7)  - Tacrolimus 2mg BID (will discharge on this dose)     Acute hypoxic respiratory failure likely 2/2 HFrEf as below  H/o bilateral lung transplant 2/2 COPD and Alpha-1-antitrypsin deficiency  Complex Sleep Apnea  Transplanted in 2013 for A1AT deficency. 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 Has not had Tacrolimus level checked in >1 month prior to hospitalization. On check was found to be significantly elevated at 39.8. Now in normal range, transplant pulm titrating to goal. Requires 2L NC intermittently (mostly at night), was not on any O2 at home or TCU. Noted to have Complex Sleep Apnea in 2014, was recommended to have further investigation and an adaptive sero-ventilation device but did not pursue this. Overnight oximetry  confirms that he needs 2L NC to maintain Sat >92%.   - Wean O2 to maintain O2 sat of >92%.   - Transplant Pulmonology following  - Continue BID Prednisone (5 and 2.5mg), Dapsone, Montelukast  - Tacrolimus 2mg BID (goal 8-10)   - DuoNeb PRN  - Continue Pulmonary toilet     New HFrEF (EF 35-40%)  EF of 55-60% on 1/11/24. Echo from this admission on 10/28/24 showing new EF of 35-40%, moderate diffuse hypokinesis. Difficult to assess WMA due to Afib. No significant valvular abnormalities. EKG on 10/25 without ST changes. Low concern for thyroid dysfunction, PE, ACS, as the cause of this acute presentation. Might be Tacrolimus induced cardiomyopathy vs sepsis. Coronary angiogram on 11/1 without new angiographic lesions to explain drop in EF.   - PTA 20mg PO Lasix  - 2g NA and 2L fluid restriction  - GDMT              - ACEi/ARB: losartan 50 mg daily              - MRA: Spironolactone 12.5mg PO              - BB: PTA Carvedilol 6.25 mg BID              - SGLT2i: Initiate outpt, holding now due to urosepsis  - Cardiology following     Sepsis c/b suspected septic shock   Initially presented on 10/24 septic with fever, tachycardia, leukopenia, and elevated lactate. UA showing pyuria and bacteruria, urine cx not obtained. CT C/A/P showing R upper and middle lobe ill defined nodules concerning for infectious/inflammatory etiology. No abdominal focus of infection. Procalcitonin uptrending from 16 to 37. Negative Bcx and Urine cx. Negative strep pneumo, legionella, COVID, Flu, RSV, MRSA. Developed shock on AM of 10/25 with further course as below. 10/27 urine culture from OSH growing >100K/CFU e coli. IgG low although adequate at 552 on 10/27, no need for IVIG.  - Histo, B-D glucan, Aspergillus negative  - DSA negative  - continue Ciprofloxacin through 11/7 for 14 day course  - Transplant ID consulted.      Septic shock vs Adrenal crisis (resolved)  Became significantly hypotensive AM of 10/25. Thought to be 2/2 septic shock vs  acute adrenal insufficiency. Received IVF and stress dose steroids. Lactate peaked at 7.2 and downtrended. BP has been stable. Lactate, Procalcitonin, and CRP downtrended. Discontinued SDS and BP has remained stable.  - If hypotensive, could consider SDS +/- gentle IVF  - stopped fludracortisone: seems he was on this mostly for hyperkalemia rather than adrenal insufficiency     Pancytopenia - Resolved  h/o normocytic anemia  New pancytopenia with this presentation on top of existing anemia. Baseline Hgb 9-10. Improving since onset. On admission WBC 3.9, Hgb 9.2, and Plt 147. Differential includes marrow suppression from acute infection, DIC, medication effect, and malignancy. Fibrinogen normal. D-dimer mildly elevated at 1.19. Given improvement on abx this was most likely due to infection.   - CTM     h/o Bilateral BKA 2/2 severe PAD and limb ischemia  S/p bilateral BKA (left 9/25 and right 8/28). Noted some wound dehiscence and granulation tissue on L stump over last few weeks but not a significant amount of drainage or purulence.   - WOC consult     Difficult vascular access  Pt has had difficulties with PICC placement by vascular access team. Also has difficulties with PIVs, they flush and draw appropriately however he still develops significant edema in his arms when medications are infusing, as well as severe bruising. Discussed with IR and recommended line (if ultimately needed) would be single lumen cuffed low flow catheter tunneled line into the internal jugular vein. But would not be recommended in the setting of CKD and preservation for possible future dialysis.      History of HIT  History of DVT/PE  - PTA Apixaban 5mg BID    History of CMV Viremia  Last level (10/25) negative. Per OP notes, pt. had been on chronic valcyte, but this was stopped secondary to cost.   - CMV monitoring weekly for 4 weeks given stress dose steroids (next 11/8)   - Negative on 11/1     Reported H/o CKD3b   Recent Cr and eGFR  have been in the normal range after bilateral BKA. Current Baseline Cr 0.9-1.0. Unknown baseline prior. Cystatin C of 1.4 with eGFR of 48.  - CTM      Steroid induced DM  A1c of 4.2% on 8/25/24. Home dose of Lantus 18u in morning and Novolog 5u with breakfast and 3u with lunch/dinner. Given his very low A1c and episodes of asymptomatic hypoglycemia in the hospital on his home doses adjustments have been made. - Lantus 6u in AM   - [id number] with breakfast/lunch/dinner  - MDSSI  - BS check before meals and in morning  - Hypoglycemia protocol      Hypocalcemia  Hypomagnesemia  Hypermagnesemia  Hypokalemia  Vit D normal at 23 and PTH mildly elevated at 99 on 10/27/24  - CTM and correct hypomagnesemia  - CTM calcium, may provide oral replacement once magnesium is corrected  - replete to K>3.5     GERD  - PTA Pantoprazole 40mg     HTN   - Resumed Coreg 6.25 BID,     CAD  Follows with cardiology, last seen 8/2024. CAD s/p PCI with MEGHNA x 1 to LAD (1/11/24) and PCI with MEGHNA x 1 to RCA (2/16/24). Managed PTA with DAPT (ASA 81 mg and clopidogrel 75 mg). He is to be on DAPT x 1 year followed by lifelong ASA 81 mg monotherapy.   - Continue ASA  - Continue Rosuvastatin  - Cardiology consulted     History of diffuse large b-cell lymphoma of esophagus  - diagnosed after GI bleed in 3/24.  Started on weekly rituximab x 4 doses in 4/2024.  Restaging PET after this showed good response with decreased hypermetabolism in distal duodenum.    - held off on further rituximab given plan for vascular surgery for non-healing foot ulcers.    - follows with Dr. Drake ([facility] oncology) and perhaps with a local oncologist as well.  Plan was for repeat PET scan in 3 months, which would be this month but I don't see this scheduled.  Recommend follow-up with oncology on discharge.          Diet: Snacks/Supplements Adult: [employer]; Between Meals  Fluid restriction 2000 ML FLUID  Low Saturated Fat Na <2400 mg    DVT Prophylaxis: PTA DOAC  [place]  "Catheter: Not present  Fluids: None  Lines: None     Cardiac Monitoring: None  Code Status: Full Code      Clinically Significant Risk Factors               # Hypoalbuminemia: Lowest albumin = 2.2 g/dL at 10/26/2024  5:33 AM, will monitor as appropriate     # Hypertension: Noted on problem list    # Chronic heart failure with reduced ejection fraction: last echo with EF <40%          # Overweight: Estimated body mass index is 29.75 kg/m  as calculated from the following:    Height as of this encounter: 1.384 m (4' 6.5\").    Weight as of this encounter: 57 kg (125 lb 10.6 oz).      # Financial/Environmental Concerns: none         Disposition Plan      Expected Discharge Date: 11/05/2024    Discharge Delays: *Medically Ready for Discharge  Placement - TCU  Destination: other (comment) (TCU)  Discharge Comments: TCU unavailable to accept back due to their staffing shortages over the weekend.        The patient's care was discussed with the Attending Physician, Dr. Ellis .    Jonny Sutherland MD  Internal Medicine, PGY1  Medicine Service, [address]  Securely message with [employer] (more info)  Text page via [facility] Paging/Directory   See signed in provider for up to date coverage information  ______________________________________________________________________    Interval History   NAEON. Unfortunately his TCU had 2 sudden transfers come up and remain short staffed, as such they are unable to take him back today. I spoke to them and confirmed that there is no possibility of him returning today but plan is to be transferred tomorrow.     Had only 2 BM yesterday and 1 so fat this morning. More formed than his usual BM. Eating well and doing exercises in bed. Denies cough, SOB, chest pain, abd pain, n/v, or headache.     Physical Exam   Vital Signs: Temp: 98.4  F (36.9  C) Temp src: Oral BP: 108/68 Pulse: 101   Resp: 18 SpO2: 93 % O2 Device: None (Room air)  "   Weight: 125 lbs 10.6 oz    Constitutional: Alert. Sitting in bed comfortably. NAD. Well developed and nourished   Respiratory: On RA. No crackles or wheezes. Normal inspiratory effort.   Cardiovascular: RRR, No murmurs, rubs, or gallops. Normal S1 and S2.   Musculoskeletal: BKA with dressings on both stumps. Dressings appear CDI.   Skin: scattered bruising to BUE, no other bruises, rashes, lesions, no jaundice.     Medical Decision Making       Please see A&P for additional details of medical decision making.      Data   Most Recent 3 CBC's:  Recent Labs   Lab Test 11/02/24  0527 11/01/24  1341 11/01/24  0552   WBC 7.9 9.4 8.4   HGB 9.2* 9.6* 9.7*    100 99    238 228     Most Recent 3 BMP's:  Recent Labs   Lab Test 11/04/24  1730 11/04/24  1248 11/04/24  0913 11/02/24  0841 11/02/24  0527 11/01/24  1912 11/01/24  1341 11/01/24  1028 11/01/24  0552   NA  --   --   --   --  137  --  136  --  138   POTASSIUM  --   --   --   --  4.8  --  3.9  --  4.4   CHLORIDE  --   --   --   --  102  --  103  --  104   CO2  --   --   --   --  26  --  25  --  25   BUN  --   --   --   --  24.9*  --  26.2*  --  28.1*   CR  --   --   --   --  0.74  --  0.64*  --  0.73   ANIONGAP  --   --   --   --  9  --  8  --  9   ERIN  --   --   --   --  8.2*  --  8.2*  --  8.1*   * 177* 131*   < > 137*   < > 114*   < > 115*    < > = values in this interval not displayed.     Most Recent 2 LFT's:  Recent Labs   Lab Test 10/27/24  0534 10/26/24  1753   AST 45 47*   ALT 24 29   ALKPHOS 110 117   BILITOTAL 0.5 0.4     Most Recent 3 Creatinines:  Recent Labs   Lab Test 11/02/24  0527 11/01/24  1341 11/01/24  0552   CR 0.74 0.64* 0.73     Most Recent 3 Hemoglobins:  Recent Labs   Lab Test 11/02/24  0527 11/01/24  1341 11/01/24  0552   HGB 9.2* 9.6* 9.7*     Most Recent 6 Bacteria Isolates From Any Culture (See EPIC Reports for Culture Details):  Recent Labs   Lab Test 07/06/21  1101 11/27/20  1430 10/16/20  0939 09/11/20  0818  07/16/20  0856 07/02/20  1042   CULT Heavy growth  Klebsiella pneumoniae  *  Heavy growth  Pseudomonas aeruginosa  * Moderate growth  Pseudomonas aeruginosa  * Heavy growth  Pseudomonas aeruginosa  Some antibiotics have been suppressed due to the known inducible beta lactamase activity.   Please contact Microbiology for more information.  * Heavy growth  Escherichia coli  *  Heavy growth  Enterococcus faecalis  * Moderate growth  Stenotrophomonas maltophilia  * No growth after 6 days  No growth after 6 days     Most Recent 6 glucoses:  Recent Labs   Lab Test 11/04/24  1730 11/04/24  1248 11/04/24  0913 11/04/24  0326 11/03/24  2134 11/03/24  1801   * 177* 131* 115* 113* 132*     Most Recent ESR & CRP:  Recent Labs   Lab Test 11/01/24  2330 10/25/24  0024 08/25/24  1632 07/02/20  1042   SED  --   --  52* 16*   CRP  --   --   --  0.4   CRPI 19.40*   < > 82.30*  --     < > = values in this interval not displayed.

## 2024-11-05 NOTE — PLAN OF CARE
Goal Outcome Evaluation:       Discharge to:  [facility] TCU  Transportation: Provided by Pt wife   Time: 1100  Prescriptions: Sent to TCU pharmacy   Belongings: Taken away by pt wife.    -if applicable return home meds from inpatient pharmacy: Checked with pharmacy, pt did not have any meds.   Access: RPIV removed   Care plan and education discontinued: Yes  Paperwork:  AVS and transfer paperwork given to py wife. Nurse to nurse given to Jeimy CAMPA at [facility].

## 2024-11-05 NOTE — PLAN OF CARE
Goal Outcome Evaluation:      Plan of Care Reviewed With: patient    Overall Patient Progress: improvingOverall Patient Progress: improving    Outcome Evaluation: Pt A&Ox4 with VSS on RA. denied pain. Bilateral BKA CDI. shift weight pt as pt would allow. voiding spontaneously in urinal, no BM on shift. Plan is to potentially discharge today back to TCU.

## 2024-11-05 NOTE — PLAN OF CARE
Physical Therapy Discharge Summary    Reason for therapy discharge:    Discharged to transitional care facility.    Progress towards therapy goal(s). See [facility] on Care [facility] record for goal details.  Goals partially met.  Barriers to achieving goals:   discharge from facility.    Therapy recommendation(s):    Continued therapy is recommended.  Rationale/Recommendations:  Patient will benefit from continued skilled therapy in TCU to progress and optimize functional endurance, strength and overall safety and independence with mobility.

## 2024-11-05 NOTE — PROGRESS NOTES
Occupational Therapy Discharge Summary    Reason for therapy discharge:    Discharged to transitional care facility.    Progress towards therapy goal(s). See goals on Care Plan in [facility] electronic health record for goal details.  Goals partially met.  Barriers to achieving goals:   discharge from facility.    Therapy recommendation(s):    Continued therapy is recommended.  Rationale/Recommendations:  To promote IND and safety with ADLs and functional mobility with slide board transfer practice to w/c and potentially to [facility] with appropriate education/practice.

## 2024-11-05 NOTE — PLAN OF CARE
Goal Outcome Evaluation:       PM shift 3-1130pm  Pt is a/o times 4. Lungs are clear. HR slightly tachy at 101. On coreg. Able to use urinal. Encouraged to reposition off of his back to prevent skin breakdown. Butt pink but skin intact/blanchable. Eating well. Pre meal blood sugar 117. Able to take pills whole.  Offers no c/o's. Very pleasant.

## 2024-11-06 ENCOUNTER — TELEPHONE (OUTPATIENT)
Dept: CARDIOLOGY | Facility: CLINIC | Age: 69
End: 2024-11-06
Payer: MEDICARE

## 2024-11-06 ENCOUNTER — PATIENT OUTREACH (OUTPATIENT)
Dept: ONCOLOGY | Facility: CLINIC | Age: 69
End: 2024-11-06
Payer: MEDICARE

## 2024-11-06 DIAGNOSIS — D47.Z1 PTLD (POST-TRANSPLANT LYMPHOPROLIFERATIVE DISORDER) (H): Primary | ICD-10-CM

## 2024-11-06 NOTE — TELEPHONE ENCOUNTER
Left Voicemail (1st Attempt) for the patient to call back and schedule the following:    Appointment type: RTN CARDIO  Provider: JUSTIN  Return date: 02/28/25  Specialty phone number: [phone] OPT 1  Additional appointment(s) needed: N/A  Additonal Notes: N/A

## 2024-11-06 NOTE — PROGRESS NOTES
[facility]: Cancer Care                                                                                          Aubrey currently residing at [facility]. Phone number for [facility]: [phone]  [facility] faxed lab orders for labs prior to visit with Dr. Drake on 11/14.     Fax number [phone]         Signature:  Yohana Justice RN

## 2024-11-06 NOTE — DISCHARGE SUMMARY
"[facility]  Discharge Summary - Medicine & Pediatrics       Date of Admission:  10/26/2024  Date of Discharge:  11/5/2024 11:18 AM  Discharging Provider: Brian Iglesias MD  Discharge Service: Medicine Service, ANGELA TEAM 1    Discharge Diagnoses   Acute hypoxic respiratory failure likely 2/2 HFrEf as below  H/o bilateral lung transplant 2/2 COPD and Alpha-1-antitrypsin deficiency  Complex Sleep Apnea  New HFrEF (EF 35-40%)  Sepsis c/b suspected septic shock   Septic shock vs Adrenal crisis (resolved)  Pancytopenia - Resolved  h/o normocytic anemia  h/o Bilateral BKA 2/2 severe PAD and limb ischemia  History of HIT  History of DVT/PE  History of CMV Viremia  Reported H/o CKD3b   Hypocalcemia  Hypomagnesemia  Hypermagnesemia  Hypokalemia  GERD  CAD  HTN  History of diffuse large b-cell lymphoma of esophagus      Clinically Significant Risk Factors     # Overweight: Estimated body mass index is 29.75 kg/m  as calculated from the following:    Height as of this encounter: 1.384 m (4' 6.5\").    Weight as of this encounter: 57 kg (125 lb 10.6 oz).       Follow-ups Needed After Discharge   Follow-up Appointments       Follow Up and recommended labs and tests      Follow with cardiology to discuss heart failure    Follow up with transplant pulmonology on 12/19/24    Will need insulin dose adjusted based on blood sugars, can be done by PCP or TCU doc    Oxygen ordered for nocturnal use: neeeds 2L of O2 at night based on overnight oximetry studies    Follow up with oncology for PTLD history    Needs tacrolimus level checked on 11/6 and at least weekly after            - check tacrolimus level 11/6 and at least weekly after  - oncology follow up for hx DLBCL/PTLD  - cardiology follow up for new HFrEF  - resume azithromycin once finished with course of ciprofloxacin for UTI on 11/10  - transplant pulmonology follow up  - 2L of O2 at night based on overnight oximetry studies  - may " need insulin adjusted    Unresulted Labs Ordered in the Past 30 Days of this Admission       Date and Time Order Name Status Description    9/26/2024  8:44 AM Prepare red blood cells (unit) Preliminary     9/26/2024  8:44 AM Prepare red blood cells (unit) Preliminary         These results will be followed up by TCU Physician    Discharge Disposition   Discharged to short-term care facility  Condition at discharge: Stable    Hospital Course   Aubrey Duncan who has a history of CAD, DLBCL of esophagus (s/p rituximab), HLD, DM2, CKD 3b, Bilateral lung transplant 2/2 COPD and Alpha-1-antitrypsin deficiency in 2013, GERD, Anemia, and recent BKA was admitted on 10/26/2024 for for further workup of shock. The following problems were addressed during his hospitalization:    Acute hypoxic respiratory failure likely 2/2 HFrEf as below  H/o bilateral lung transplant 2/2 COPD and Alpha-1-antitrypsin deficiency  Complex Sleep Apnea  Transplanted in 2013 for A1AT deficency. 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 Has not had Tacrolimus level checked in >1 month prior to hospitalization. On check was found to be significantly elevated at 39.8. Now in normal range, transplant pulm titrating to goal. Requires 2L NC (mostly at night), was not on any O2 at home or TCU. Noted to have Complex Sleep Apnea in 2014, was recommended to have further investigation and an adaptive sero-ventilation device but did not pursue this. Overnight oximetry confirms that he needs 2L NC to maintain Sat >92%.   - needs 2L NC to maintain Sat >92% at night  - Continue BID Prednisone (5 and 2.5mg), Dapsone, Montelukast  - Tacrolimus 2mg BID (goal 8-10). check tacrolimus level 11/6 and at least weekly after   - stopped fludrocortisone (was on this for tacrolimus-induced hypokalemia, but has since developed hyperkalemia)  - DuoNeb PRN  -  Continue Pulmonary toilet     New HFrEF (EF 35-40%)  EF of 55-60% on 1/11/24. Echo from this admission on 10/28/24 showing new EF of 35-40%, moderate diffuse hypokinesis. Difficult to assess WMA due to Afib. No significant valvular abnormalities. EKG on 10/25 without ST changes. Low concern for thyroid dysfunction, PE, ACS, as the cause of this acute presentation. Might be Tacrolimus induced cardiomyopathy vs sepsis. Coronary angiogram on 11/1 without new angiographic lesions to explain drop in EF.   - PTA 20mg PO Lasix  - 2g sodium and 2L fluid restriction  - GDMT              - ACEi/ARB: losartan 50 mg daily              - MRA: Spironolactone 12.5mg PO              - BB: PTA Carvedilol 6.25 mg BID              - SGLT2i: recommend avoiding given urosepsis, discuss with cardiology in the future   - Cardiology follow up     Sepsis c/b suspected septic shock   Initially presented on 10/24 septic with fever, tachycardia, leukopenia, and elevated lactate. UA showing pyuria and bacteruria, urine cx not obtained. CT C/A/P showing R upper and middle lobe ill defined nodules concerning for infectious/inflammatory etiology. No abdominal focus of infection. Procalcitonin uptrending from 16 to 37. Negative Bcx and Urine cx. Negative strep pneumo, legionella, COVID, Flu, RSV, MRSA. Developed shock on AM of 10/25 with further course as below. 10/27 urine culture from OSH growing >100K/CFU e coli. IgG low although adequate at 552 on 10/27, no need for IVIG.Transplant ID consulted this admission.   - continue Ciprofloxacin for 14 day course     Septic shock vs Adrenal crisis (resolved)  Became significantly hypotensive AM of 10/25. Thought to be 2/2 septic shock vs acute adrenal insufficiency. Received IVF and stress dose steroids. Lactate peaked at 7.2 and downtrended. BP has been stable. Lactate, Procalcitonin, and CRP downtrended. Discontinued steroids after one day and BP remained stable. No documented history of adrenal  insufficiency.      Pancytopenia - Resolved  h/o normocytic anemia  New pancytopenia with this presentation on top of existing anemia. Baseline Hgb 9-10. On admission WBC 3.9, Hgb 9.2, and Plt 147. Differential includes marrow suppression from acute infection, DIC, medication effect, and malignancy. Fibrinogen normal. D-dimer mildly elevated at 1.19. Given improvement on abx this was most likely due to sepsis.      h/o Bilateral BKA 2/2 severe PAD and limb ischemia  S/p bilateral BKA (left 9/25 and right 8/28). Noted some wound dehiscence and granulation tissue on L stump over last few weeks but not a significant amount of drainage or purulence.   - continue wound cares     Difficult vascular access  Had difficulties with PICC placement by vascular access team. Also has difficulties with PIVs, they flush and draw appropriately however he still develops significant edema in his arms when medications are infusing, as well as severe bruising. Did not ultimately need PICC this admission.      History of HIT  History of DVT/PE  - PTA Apixaban 5mg BID     History of CMV Viremia  Last level (10/25) negative. Per OP notes, pt. had been on chronic valcyte, but this was stopped secondary to cost.   - CMV monitoring weekly for 4 weeks given stress dose steroids (next 11/8)              - Negative on 11/1     Reported H/o CKD3b   Recent Cr and eGFR have been in the normal range after bilateral BKA. Current Baseline Cr 0.9-1.0. Unknown baseline prior. Cystatin C of 1.4 with eGFR of 48.     Steroid induced DM  A1c of 4.2% on 8/25/24. Home dose of Lantus 18u in morning and Novolog 5u with breakfast and 3u with lunch/dinner. Given his very low A1c and episodes of asymptomatic hypoglycemia in the hospital on his home doses adjustments have been made.   - Lantus 6u in AM   - Novolog 3U with breakfast/lunch/dinner  - may need insulin dose adjusted     Hypocalcemia  Hypomagnesemia  Hypermagnesemia  Hypokalemia  Vit D normal at 23 and  PTH mildly elevated at 99 on 10/27/24.      GERD  - PTA Pantoprazole 40mg     HTN   - Resumed Coreg 6.25 BID,      CAD  Follows with cardiology, last seen 8/2024. CAD s/p PCI with MEGHNA x 1 to LAD (1/11/24) and PCI with MEGHNA x 1 to RCA (2/16/24). Managed PTA with DAPT (ASA 81 mg and clopidogrel 75 mg). He is to be on DAPT x 1 year followed by lifelong ASA 81 mg monotherapy.   - Continue ASA  - Continue Rosuvastatin  - Cardiology clinic follow up     History of diffuse large b-cell lymphoma of esophagus  - diagnosed after GI bleed in 3/24.  Started on weekly rituximab x 4 doses in 4/2024.  Restaging PET after this showed good response with decreased hypermetabolism in distal duodenum. held off on further rituximab given plan for vascular surgery for non-healing foot ulcers.    - follows with Dr. Drake ([facility] oncology) and perhaps with a local oncologist as well.  Plan was for repeat PET scan in 3 months, which would be this month but I don't see this scheduled.    - follow-up with oncology       Consultations This Hospital Stay   WOUND OSTOMY CONTINENCE NURSE  IP CONSULT  INFECTIOUS DISEASE TRANSPLANT SOT ADULT IP CONSULT  PULMONARY CF/TRANSPLANT ADULT IP CONSULT  PHARMACY TO DOSE VANCO  PHARMACY TO DOSE VANCO  VASCULAR ACCESS ADULT IP CONSULT  NURSING TO CONSULT FOR VASCULAR ACCESS CARE IP CONSULT  CARE MANAGEMENT / SOCIAL WORK IP CONSULT  VASCULAR ACCESS FOR PICC PLACEMENT ADULT IP CONSULT  INTERVENTIONAL RADIOLOGY ADULT/PEDS IP CONSULT  WOUND OSTOMY CONTINENCE NURSE  IP CONSULT  SPEECH LANGUAGE PATH ADULT IP CONSULT  CARDIOLOGY GENERAL ADULT IP CONSULT  PHYSICAL THERAPY ADULT IP CONSULT  OCCUPATIONAL THERAPY ADULT IP CONSULT  PHARMACY IP CONSULT  PHYSICAL THERAPY ADULT IP CONSULT  OCCUPATIONAL THERAPY ADULT IP CONSULT  SMOKING CESSATION PROGRAM IP CONSULT    Code Status   Full Code       The patient was discussed with Dr. Harris Rubio MD  [facility] [address] MED SURG  [address]  [place] [address]  Phone: [phone]  ______________________________________________________________________    Physical Exam   Vital Signs:                    Weight: 125 lbs 10.6 oz  Constitutional: Alert. Sitting in bed comfortably. NAD. Well developed and nourished   Respiratory: On RA. No crackles or wheezes. Normal inspiratory effort.   Cardiovascular: RRR, No murmurs, rubs, or gallops. Normal S1 and S2.   Musculoskeletal: BKA with dressings on both stumps. Dressings appear CDI.   Skin: scattered bruising to BUE, no other bruises, rashes, lesions, no jaundice      Primary Care Physician   Hoa Olivarez    Discharge Orders      Follow-Up with Cardiology MEETA      Medication Therapy Management Referral      Primary Care - Care Coordination Referral      Adult Oncology/Hematology  Referral      General info for SNF    Length of Stay Estimate: Short Term Care: Estimated # of Days <30  Condition at Discharge: Stable  Level of care:skilled   Rehabilitation Potential: Good  Admission H&P remains valid and up-to-date: Yes  Recent Chemotherapy: N/A  Use Nursing Home Standing Orders: Yes     Mantoux instructions    Give two-step Mantoux (PPD) Per Facility Policy Yes     Follow Up and recommended labs and tests    Follow with cardiology to discuss heart failure    Follow up with transplant pulmonology on 12/19/24    Will need insulin dose adjusted based on blood sugars, can be done by PCP or TCU doc    Oxygen ordered for nocturnal use: neeeds 2L of O2 at night based on overnight oximetry studies    Follow up with oncology for PTLD history    Needs tacrolimus level checked on 11/6 and at least weekly after     Reason for your hospital stay    You were admitted to the hospital due to sepsis from a urinary tract infection. You should continue ciprofloxacin through 10/10. Restart your azithromycin after finishing this. You were also found to have a new diagnosis of heart failure and were started on a few new  medications to help treat this. You will need to follow up with cardiology in clinic for this as well. You will also need to follow up with oncology in clinic for your history of PTLD.     Activity - Up ad aviva     Full Code     Physical Therapy Adult Consult    Evaluate and treat as clinically indicated.    Reason:  weakness/bl BKA     Occupational Therapy Adult Consult    Evaluate and treat as clinically indicated.    Reason:  weakness/bl BKA     Oxygen (SNF/TCU) Discharge     Home Oxygen Order for DME - ONLY FOR DME     Diet    Follow this diet upon discharge: Current Diet:Orders Placed This Encounter      Snacks/Supplements Adult: [employer]; Between Meals      Fluid restriction 2000 ML FLUID      Low Saturated Fat Na <2400 mg       Significant Results and Procedures   Most Recent 3 CBC's:  Recent Labs   Lab Test 11/02/24  0527 11/01/24  1341 11/01/24  0552   WBC 7.9 9.4 8.4   HGB 9.2* 9.6* 9.7*    100 99    238 228     Most Recent 3 BMP's:  Recent Labs   Lab Test 11/05/24  0924 11/05/24  0211 11/04/24  2213 11/02/24  0841 11/02/24  0527 11/01/24  1912 11/01/24  1341 11/01/24  1028 11/01/24  0552   NA  --   --   --   --  137  --  136  --  138   POTASSIUM  --   --   --   --  4.8  --  3.9  --  4.4   CHLORIDE  --   --   --   --  102  --  103  --  104   CO2  --   --   --   --  26  --  25  --  25   BUN  --   --   --   --  24.9*  --  26.2*  --  28.1*   CR  --   --   --   --  0.74  --  0.64*  --  0.73   ANIONGAP  --   --   --   --  9  --  8  --  9   ERIN  --   --   --   --  8.2*  --  8.2*  --  8.1*   * 139* 107*   < > 137*   < > 114*   < > 115*    < > = values in this interval not displayed.       Discharge Medications   Discharge Medication List as of 11/5/2024 10:54 AM        START taking these medications    Details   ciprofloxacin (CIPRO) 500 MG tablet Take 1 tablet (500 mg) by mouth every 12 hours for 5 days., Transitional      losartan (COZAAR) 50 MG tablet Take 1 tablet (50 mg) by mouth  daily., Transitional      magnesium oxide (MAG-OX) 400 MG tablet Take 2 tablets (800 mg) by mouth daily., Transitional      multivitamin w/minerals (THERA-VIT-M) tablet Place 1 tablet into Feeding Tube daily (with lunch)., Transitional      spironolactone (ALDACTONE) 25 MG tablet Take 0.5 tablets (12.5 mg) by mouth daily., Transitional           CONTINUE these medications which have CHANGED    Details   !! insulin aspart (NOVOLOG FLEXPEN) 100 UNIT/ML pen Inject 3 Units subcutaneously daily (with breakfast). Do not give if blood sugar < 70 mg/dL., Transitional      insulin glargine (LANTUS PEN) 100 UNIT/ML pen Inject 6 Units subcutaneously every morning (before breakfast)., TransitionalIf Lantus is not covered by insurance, may substitute Basaglar or Semglee or other insulin glargine product per insurance preference at same dose and frequency.        senna-docusate (SENOKOT-S/PERICOLACE) 8.6-50 MG tablet Take 1 tablet by mouth 2 times daily as needed for constipation., Transitional      tacrolimus (GENERIC EQUIVALENT) 1 MG capsule Take 2 capsules (2 mg) by mouth 2 times daily., TransitionalPlease follow up with your transplant Pulmonary team regarding dosing. Will need to have levels monitored as an outpatient.       !! - Potential duplicate medications found. Please discuss with provider.        CONTINUE these medications which have NOT CHANGED    Details   acetaminophen (TYLENOL) 325 MG tablet Take 2 tablets (650 mg) by mouth every 6 hours as needed for mild pain, Disp-60 tablet, R-0, E-Prescribe      albuterol (PROAIR HFA/PROVENTIL HFA/VENTOLIN HFA) 108 (90 Base) MCG/ACT inhaler Inhale 1-2 puffs into the lungs every 6 hours as needed for shortness of breath or wheezing, Disp-8.5 g, R-3, E-PrescribePharmacy may dispense brand covered by insurance (Proair, or proventil or ventolin or generic albuterol inhaler)      apixaban ANTICOAGULANT (ELIQUIS) 5 MG tablet Take 1 tablet (5 mg) by mouth 2 times daily.,  Transitional      aspirin (ASA) 81 MG EC tablet Take 1 tablet (81 mg) by mouth daily, Disp-90 tablet, R-3, E-Prescribe      azithromycin (ZITHROMAX) 250 MG tablet Take 250 mg by mouth Every Mon, Wed, Fri Morning, Historical      bisacodyl (DULCOLAX) 10 MG suppository Place 1 suppository (10 mg) rectally daily as needed for constipation (Use if magnesium hydroxide (MILK of MAGNESIA) is not effective after 24 hours. May discontinue if patient having bowel movement.)., Transitional      blood glucose (NO BRAND SPECIFIED) test strip USE TO TEST BLOOD SUGAR 3 TIMES DAILY. DIAG CODE: E11.9, Disp-300 strip, R-3, E-Prescribe      Calcium Carbonate-Vitamin D 600-10 MG-MCG TABS Take 1 tablet by mouth 2 times daily (with meals), Disp-60 tablet, R-11, E-Prescribe      carvedilol (COREG) 6.25 MG tablet TAKE 1 TABLET (6.25 MG) BY MOUTH 2 TIMES DAILY (WITH MEALS), Disp-120 tablet, R-3, E-Prescribe      dapsone (ACZONE) 25 MG tablet Take 2 tablets (50 mg) by mouth daily, Disp-180 tablet, R-3, E-Prescribe      diclofenac (VOLTAREN) 1 % topical gel Apply 2 g topically 2 times daily as needed for moderate pain, Historical      Ferrous Sulfate Dried (HIGH POTENCY IRON) 65 MG TABS Take 1 tablet by mouth every morning., Historical      fluticasone-salmeterol (ADVAIR-HFA) 230-21 MCG/ACT inhaler Inhale 2 puffs into the lungs 2 times daily, Disp-8 g, R-11, E-Prescribe      furosemide (LASIX) 20 MG tablet Take 1 tablet (20 mg) by mouth daily, Disp-90 tablet, R-4, E-Prescribe      HYDROmorphone (DILAUDID) 2 MG tablet Take 0.5 tablets (1 mg) by mouth every 6 hours as needed for severe pain., Disp-15 tablet, Transitional      !! insulin aspart (NOVOLOG PEN) 100 UNIT/ML pen Inject 3 Units subcutaneously 2 times daily (with meals). Lunch & supper, Historical      insulin pen needle (32G X 4 MM) 32G X 4 MM miscellaneous Use 4 pen needles daily or as directed. Dispense as insurance allows. Dx. Code: E09.9Disp-400 each, [id number]       loperamide (IMODIUM) 2 MG capsule Take 1 capsule (2 mg) by mouth 4 times daily as needed for diarrhea, Disp-120 capsule, R-12, E-Prescribe      magnesium hydroxide (MILK OF MAGNESIA) 400 MG/5ML suspension Take 30 mLs by mouth daily as needed for constipation (Use if polyethylene glycol (Miralax) is not effective after 24 hours.)., Transitional      melatonin 1 MG TABS tablet Take 1 tablet (1 mg) by mouth nightly as needed for sleep., Transitional      metoclopramide (REGLAN) 5 MG tablet Take 1 tablet (5 mg) by mouth every 6 hours as needed (nausea), Disp-30 tablet, R-0, E-Prescribe      Microlet Lancets MISC CHECK BLOOD SUGAR FOUR TIMES DAILY E11.9, Disp-400 each, R-1, E-PrescribePt states he tests3 times a day. Testing above 3 times a dayrequires extra documentation to bill MEDICARE. Might you be willing to send a new RX for TID testing? PLEASE AND THANKS!       montelukast (SINGULAIR) 10 MG tablet Take 1 tablet (10 mg) by mouth every evening, Disp-90 tablet, R-3, E-Prescribe      multivitamin, therapeutic (THERA-VIT) TABS Take 1 tablet by mouth daily, Disp-30 tablet, R-12, E-Prescribe      order for DME Equipment being ordered: diabetic shoesDisp-1 each, R-0, Local Print      pantoprazole (PROTONIX) 40 MG EC tablet Take 1 tablet (40 mg) by mouth daily, Disp-90 tablet, R-1, E-Prescribe      !! predniSONE (DELTASONE) 5 MG tablet Take 1 tablet by mouth every morning., Historical      !! predniSONE (DELTASONE) 5 MG tablet Take 0.5 tablets by mouth every evening., Historical      psyllium (METAMUCIL/KONSYL) capsule Take 2 capsules by mouth 2 times daily., TransitionalPharmacy to dispense capsule size that is available.      rosuvastatin (CRESTOR) 40 MG tablet Take 20 mg by mouth Every Mon, Wed, Fri Morning, Historical      thiamine (B-1) 100 MG tablet Take 1 tablet (100 mg) by mouth daily., Transitional      traMADol 25 MG TABS tablet Take 1 tablet (25 mg) by mouth every 4 hours as needed for moderate pain., Disp-20  "tablet, R-0, Local Print      venlafaxine (EFFEXOR XR) 37.5 MG 24 hr capsule Take 37.5 mg by mouth daily., Historical      wound support modular (EXPEDITE) LIQD bottle Take 60 mLs by mouth daily., Transitional       !! - Potential duplicate medications found. Please discuss with provider.        STOP taking these medications       acyclovir (ZOVIRAX) 400 MG tablet Comments:   Reason for Stopping:         econazole nitrate 1 % external cream Comments:   Reason for Stopping:         fludrocortisone (FLORINEF) 0.1 MG tablet Comments:   Reason for Stopping:         magnesium gluconate (MAGONATE) 500 MG tablet Comments:   Reason for Stopping:             Allergies   Allergies   Allergen Reactions    Heparin Heparin Induced Thrombocytopenia    Oxycodone Confusion     Significant lethargy. Tolerates Dilaudid well.     Fluocinolone Other (See Comments)     Tendon problems      Gabapentin Nausea and Vomiting    Levaquin Muscle Pain (Myalgia)    Pneumococcal Vaccine Swelling     Fever and \"My arm swelled up like a balloon.\"    Varicella Zoster Immune Globulin Swelling     "

## 2024-11-06 NOTE — TELEPHONE ENCOUNTER
[facility] Call Center    Phone Message    May a detailed message be left on voicemail: yes     Reason for Call: Appointment Intake    Referring Provider Name: Jovanny Rubio MD     Diagnosis and/or Symptoms: Per hospital discharge note -  Follow with cardiology to discuss heart failure     Per the above request, NEW HF? Please review and reach out to patient to schedule.     Action Taken: Message routed to:  [facility] ([facility] Cardio    Travel Screening: Not Applicable     Date of Service:

## 2024-11-08 ENCOUNTER — TELEPHONE (OUTPATIENT)
Dept: TRANSPLANT | Facility: CLINIC | Age: 69
End: 2024-11-08
Payer: MEDICARE

## 2024-11-08 ENCOUNTER — TELEPHONE (OUTPATIENT)
Dept: CARDIOLOGY | Facility: CLINIC | Age: 69
End: 2024-11-08
Payer: MEDICARE

## 2024-11-08 NOTE — TELEPHONE ENCOUNTER
Date of Admission:  10/26/2024  Date of Discharge:  11/5/2024    Increased lethargy and confusion 10/24, seen in the [facility] ER with concern for pneumonia and UTI s/p IV fluids and ceftriaxone, transferred to [facility] for sepsis, started on broad spectrum ABX, IV fluids, and stress dose steroids.  The patient was transferred to [facility] on 10/26 for further work up of sepsis and encephalopathy, possible UTI as source.  Also with supratherapeutic tacrolimus level.  Medically ready for discharge back to TCU.      Needs tacrolimus level checked on 11/6 and at least weekly after  CMV weekly monitoring x4 given recent stress dose steroids (next due 11/8)   Continue 2L NC overnight for now and repeat overnight oximetry 24-48h prior to discharge from TCU     WBC 10.1    Voicemail left for TCU asking for call back to review discharge orders and recent lab work

## 2024-11-08 NOTE — TELEPHONE ENCOUNTER
Patient Contacted for the patient to call back and schedule the following:    Appointment type:  rtn cardio   Provider: heather   Return date: 02/26/25  Specialty phone number: [phone] opt 1   Additional appointment(s) needed: n/a  Additonal Notes:  n/a

## 2024-11-09 ENCOUNTER — HEALTH MAINTENANCE LETTER (OUTPATIENT)
Age: 69
End: 2024-11-09

## 2024-11-10 ENCOUNTER — MYC MEDICAL ADVICE (OUTPATIENT)
Dept: FAMILY MEDICINE | Facility: OTHER | Age: 69
End: 2024-11-10
Payer: MEDICARE

## 2024-11-10 DIAGNOSIS — E11.42 TYPE 2 DIABETES MELLITUS WITH DIABETIC POLYNEUROPATHY, WITH LONG-TERM CURRENT USE OF INSULIN (H): Primary | ICD-10-CM

## 2024-11-10 DIAGNOSIS — Z79.4 TYPE 2 DIABETES MELLITUS WITH DIABETIC POLYNEUROPATHY, WITH LONG-TERM CURRENT USE OF INSULIN (H): Primary | ICD-10-CM

## 2024-11-10 LAB
DONOR IDENTIFICATION: NORMAL
DSA COMMENTS: NORMAL
DSA PRESENT: NO
DSA TEST METHOD: NORMAL
ORGAN: NORMAL
SA 1  COMMENTS: NORMAL
SA 1 CELL: NORMAL
SA 1 TEST METHOD: NORMAL
SA 2 CELL: NORMAL
SA 2 COMMENTS: NORMAL
SA 2 TEST METHOD: NORMAL
SA1 HI RISK ABY: NORMAL
SA1 MOD RISK ABY: NORMAL
SA2 HI RISK ABY: NORMAL
SA2 MOD RISK ABY: NORMAL
UNACCEPTABLE ANTIGENS: NORMAL
UNOS CPRA: 0

## 2024-11-11 ENCOUNTER — TELEPHONE (OUTPATIENT)
Dept: TRANSPLANT | Facility: CLINIC | Age: 69
End: 2024-11-11
Payer: MEDICARE

## 2024-11-11 NOTE — LETTER
PHYSICIAN ORDERS      DATE & TIME ISSUED: 2024 2:40 PM  PATIENT NAME: Aubrey Duncan   : 1955     [facility] MR# [if applicable]: [id number]     DIAGNOSIS:  Lung Transplant  Z94.2    24:  - please draw BMP, magnesium, CBC w/ differential, tacrolimus level (note time of last dose), and CMV PCR quant.    Any questions please call: Luz [phone]    Please fax these results to [phone].      .  Valentino Cristina MD  Division of Pulmonary, Allergy, Critical Care and Sleep Medicine  Professor of Medicine

## 2024-11-11 NOTE — TELEPHONE ENCOUNTER
General  Route to LPN    Reason for call: Isabella is returning a call from david     Call back needed? Yes    Return Call Needed  Same as documented in contacts section  When to return call?: Greater than one day: Route standard priority

## 2024-11-11 NOTE — TELEPHONE ENCOUNTER
Returned call to Isabella, nurse manager, at [facility].  Refaxed lab order for 11/12, including tacrolimus and CMV level.    WBC 10.1 on 10/7.  Not addressed by facility.  Plan for CBC w/ diff on 11/12.      Isabella states that pt has tentative discharge from TCU this Thursday.  Plan for pt to continue weekly CMV monitoring after discharge.

## 2024-11-14 ENCOUNTER — VIRTUAL VISIT (OUTPATIENT)
Dept: ONCOLOGY | Facility: CLINIC | Age: 69
End: 2024-11-14
Attending: INTERNAL MEDICINE
Payer: MEDICARE

## 2024-11-14 ENCOUNTER — TELEPHONE (OUTPATIENT)
Dept: TRANSPLANT | Facility: CLINIC | Age: 69
End: 2024-11-14
Payer: MEDICARE

## 2024-11-14 VITALS — BODY MASS INDEX: 18.99 KG/M2 | HEIGHT: 67 IN | WEIGHT: 121 LBS

## 2024-11-14 DIAGNOSIS — C83.398 DIFFUSE LARGE B-CELL LYMPHOMA OF SOLID ORGAN EXCLUDING SPLEEN: ICD-10-CM

## 2024-11-14 DIAGNOSIS — D47.Z1 PTLD (POST-TRANSPLANT LYMPHOPROLIFERATIVE DISORDER) (H): Primary | ICD-10-CM

## 2024-11-14 PROCEDURE — G2211 COMPLEX E/M VISIT ADD ON: HCPCS | Mod: 95 | Performed by: INTERNAL MEDICINE

## 2024-11-14 PROCEDURE — 99214 OFFICE O/P EST MOD 30 MIN: CPT | Mod: 95 | Performed by: INTERNAL MEDICINE

## 2024-11-14 ASSESSMENT — PAIN SCALES - GENERAL: PAINLEVEL_OUTOF10: NO PAIN (0)

## 2024-11-14 NOTE — LETTER
PHYSICIAN ORDERS      DATE & TIME ISSUED: 2024 1:48 PM  PATIENT NAME: Aubrey Duncan   : 1955     [facility] MR# [if applicable]: [id number]     DIAGNOSIS:  Lung Transplant  Z94.2    Order for weekly standing labs. Order expires 24    -tacrolimus level   -cmp  -mag  -CMV Quantitative PCR (blood)   -Cbc with diff    Any questions please call: [place] [phone]    Please fax these results to [phone].         Alma Murphy MD  Pulmonary Disease

## 2024-11-14 NOTE — NURSING NOTE
Current patient location: [address] [place] [address]    Is the patient currently in the state of [place]? YES    Visit mode:VIDEO    If the visit is dropped, the patient can be reconnected by:VIDEO VISIT: Text to cell phone:   Telephone Information:   Mobile [phone]       Will anyone else be joining the visit? NO  (If patient encounters technical issues they should call [phone]    Are changes needed to the allergy or medication list? Pt stated no changes to allergies and Pt stated no med changes - Pt just got home from hospital and is currently going over all his medications with his wife. Once he knows what medications he is taking he will update us to update his chart.     Are refills needed on medications prescribed by this physician? Discuss with provider    Rooming Documentation:  Questionnaire(s) completed    Reason for visit: RECHECK    Shruthi DAVIS

## 2024-11-14 NOTE — PROGRESS NOTES
Virtual Visit Details    Type of service:  Video Visit   Video Start Time:  2:40 PM  Video End Time: 2:52 PM    Originating Location (pt. Location): Home    Distant Location (provider location):  On-site  Platform used for Video Visit: [facility]  FOLLOW-UP VISIT NOTE  Nov 14, 2024  Subjective   REASON FOR VISIT: Follow up PTLD    ONCOLOGIC SUMMARY:  Diagnosis:  Stage IE PTLD/DLBCL dx 3/2024 in setting of bilateral lung transplant in 2013. Presented with melena and acute blood loss anemia (Hgb down to 8.2 from baseline of 11-12). EGD revealed a 5 cm segment of ulcerated and friable mucosa in the second segment of the duodenum. There was no active bleeding and the area was too diffuse to be treated endoscopically, but the area was biopsied. Case was discussed with Cardiology who recommended to continue DAPT with aspirin and Plavix given recent cardiac stenting (2/16/24). Pathology showed diffuse large B-cell lymphoma, GCB-subtype, with Ki67 80-90%. JUNG DAKOTA negative. FISH negative for MYC and BCL6 rearrangements. Staging PET showed uptake in distal duodenum (SUVmax 23.8) and LLQ small bowel (SUVmax 14.4) only.     Intent of treatment: Curative    Treatment history:  4/25/24 to 5/15/24: weekly rituximab x 4 doses. EOT PET showed PA. Further rituximab consolidation deferred d/t wound healing issues and need for amputation.     INTERVAL HISTORY:  Aubrey is seen for video follow up today. He originally canceled our follow up PET and video visit because of all of his other ongoing medical issues. Had a difficult summer with multiple hospitalizations for RLE wound infection s/p R BKA 8/28/24, bilateral critical limb ischemia s/p L BKA 9/25/24, recurrent PE, and mostly recently septic shock from pneumonia vs UTI.     He just got discharged from TCU and is staying with his daughter currently. Feeling drained but no acute new symptoms such as fevers, night sweats, SOB, N/V, bleeding. Still getting used  "to his prosthetics.     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 Tolerates Dilaudid well.     Fluocinolone Other (See Comments)     Tendon problems      Gabapentin Nausea and Vomiting    Levaquin Muscle Pain (Myalgia)    Pneumococcal Vaccine Swelling     Fever and \"My arm swelled up like a balloon.\"    Varicella Zoster Immune Globulin Swelling       Current Outpatient Medications:     acetaminophen (TYLENOL) 325 MG tablet, Take 2 tablets (650 mg) by mouth every 6 hours as needed for mild pain, Disp: 60 tablet, Rfl: 0    albuterol (PROAIR HFA/PROVENTIL HFA/VENTOLIN HFA) 108 (90 Base) MCG/ACT inhaler, Inhale 1-2 puffs into the lungs every 6 hours as needed for shortness of breath or wheezing, Disp: 8.5 g, Rfl: 3    apixaban ANTICOAGULANT (ELIQUIS) 5 MG tablet, Take 1 tablet (5 mg) by mouth 2 times daily., Disp: , Rfl:     aspirin (ASA) 81 MG EC tablet, Take 1 tablet (81 mg) by mouth daily, Disp: 90 tablet, Rfl: 3    azithromycin (ZITHROMAX) 250 MG tablet, Take 250 mg by mouth Every Mon, Wed, Fri Morning, Disp: , Rfl:     bisacodyl (DULCOLAX) 10 MG suppository, Place 1 suppository (10 mg) rectally daily as needed for constipation (Use if magnesium hydroxide (MILK of MAGNESIA) is not effective after 24 hours. May discontinue if patient having bowel movement.)., Disp: , Rfl:     blood glucose (NO BRAND SPECIFIED) test strip, " USE TO TEST BLOOD SUGAR 3 TIMES DAILY. DIAG CODE: E11.9, Disp: 300 strip, Rfl: 3    Calcium Carbonate-Vitamin D 600-10 MG-MCG TABS, Take 1 tablet by mouth 2 times daily (with meals), Disp: 60 tablet, Rfl: 11    carvedilol (COREG) 6.25 MG tablet, TAKE 1 TABLET (6.25 MG) BY MOUTH 2 TIMES DAILY (WITH MEALS), Disp: 120 tablet, Rfl: 3    dapsone (ACZONE) 25 MG tablet, Take 2 tablets (50 mg) by mouth daily, Disp: 180 tablet, Rfl: 3    diclofenac (VOLTAREN) 1 % topical gel, Apply 2 g topically 2 times daily as needed for moderate pain, Disp: , Rfl:     Ferrous Sulfate Dried (HIGH POTENCY IRON) 65 MG TABS, Take 1 tablet by mouth every morning., Disp: , Rfl:     fluticasone-salmeterol (ADVAIR-HFA) 230-21 MCG/ACT inhaler, Inhale 2 puffs into the lungs 2 times daily, Disp: 8 g, Rfl: 11    furosemide (LASIX) 20 MG tablet, Take 1 tablet (20 mg) by mouth daily, Disp: 90 tablet, Rfl: 4    HYDROmorphone (DILAUDID) 2 MG tablet, Take 0.5 tablets (1 mg) by mouth every 6 hours as needed for severe pain., Disp: 15 tablet, Rfl:     insulin aspart (NOVOLOG FLEXPEN) 100 UNIT/ML pen, Inject 3 Units subcutaneously daily (with breakfast). Do not give if blood sugar < 70 mg/dL., Disp: , Rfl:     insulin aspart (NOVOLOG PEN) 100 UNIT/ML pen, Inject 3 Units subcutaneously 2 times daily (with meals). Lunch & supper, Disp: , Rfl:     insulin glargine (LANTUS PEN) 100 UNIT/ML pen, Inject 6 Units subcutaneously every morning (before breakfast)., Disp: , Rfl:     insulin pen needle (32G X 4 MM) 32G X 4 MM miscellaneous, Use 4 pen needles daily or as directed. Dispense as insurance allows. Dx. Code: E09.9, Disp: 400 each, Rfl: 11    loperamide (IMODIUM) 2 MG capsule, Take 1 capsule (2 mg) by mouth 4 times daily as needed for diarrhea, Disp: 120 capsule, Rfl: 12    losartan (COZAAR) 50 MG tablet, Take 1 tablet (50 mg) by mouth daily., Disp: , Rfl:     magnesium hydroxide (MILK OF MAGNESIA) 400 MG/5ML suspension, Take 30 mLs by mouth daily as needed for  constipation (Use if polyethylene glycol (Miralax) is not effective after 24 hours.)., Disp: , Rfl:     magnesium oxide (MAG-OX) 400 MG tablet, Take 2 tablets (800 mg) by mouth daily., Disp: , Rfl:     melatonin 1 MG TABS tablet, Take 1 tablet (1 mg) by mouth nightly as needed for sleep., Disp: , Rfl:     metoclopramide (REGLAN) 5 MG tablet, Take 1 tablet (5 mg) by mouth every 6 hours as needed (nausea), Disp: 30 tablet, Rfl: 0    Microlet Lancets MISC, CHECK BLOOD SUGAR FOUR TIMES DAILY E11.9, Disp: 400 each, Rfl: 1    montelukast (SINGULAIR) 10 MG tablet, Take 1 tablet (10 mg) by mouth every evening, Disp: 90 tablet, Rfl: 3    multivitamin w/minerals (THERA-VIT-M) tablet, Place 1 tablet into Feeding Tube daily (with lunch)., Disp: , Rfl:     multivitamin, therapeutic (THERA-VIT) TABS, Take 1 tablet by mouth daily, Disp: 30 tablet, Rfl: 12    order for DME, Equipment being ordered: diabetic shoes, Disp: 1 each, Rfl: 0    pantoprazole (PROTONIX) 40 MG EC tablet, Take 1 tablet (40 mg) by mouth daily, Disp: 90 tablet, Rfl: 1    predniSONE (DELTASONE) 5 MG tablet, Take 1 tablet by mouth every morning., Disp: , Rfl:     predniSONE (DELTASONE) 5 MG tablet, Take 0.5 tablets by mouth every evening., Disp: , Rfl:     psyllium (METAMUCIL/KONSYL) capsule, Take 2 capsules by mouth 2 times daily., Disp: , Rfl:     rosuvastatin (CRESTOR) 40 MG tablet, Take 20 mg by mouth Every Mon, Wed, Fri Morning, Disp: , Rfl:     senna-docusate (SENOKOT-S/PERICOLACE) 8.6-50 MG tablet, Take 1 tablet by mouth 2 times daily as needed for constipation., Disp: , Rfl:     spironolactone (ALDACTONE) 25 MG tablet, Take 0.5 tablets (12.5 mg) by mouth daily., Disp: , Rfl:     tacrolimus (GENERIC EQUIVALENT) 1 MG capsule, Take 2 capsules (2 mg) by mouth 2 times daily., Disp: , Rfl:     thiamine (B-1) 100 MG tablet, Take 1 tablet (100 mg) by mouth daily., Disp: , Rfl:     traMADol 25 MG TABS tablet, Take 1 tablet (25 mg) by mouth every 4 hours as needed  for moderate pain., Disp: 20 tablet, Rfl: 0    venlafaxine (EFFEXOR XR) 37.5 MG 24 hr capsule, Take 37.5 mg by mouth daily., Disp: , Rfl:     wound support modular (EXPEDITE) LIQD bottle, Take 60 mLs by mouth daily., Disp: , Rfl:     REVIEW OF SYSTEMS:  10-point ROS reviewed and negative other than that mentioned in HPI.     Objective   ECOG PS: 2     PHYSICAL EXAM:  **Limited given video visit**  General: Patient appears well in no acute distress.   Skin: No rashes or lesions on visualized skin  Eyes: EOMI, no erythema, sclera icterus or discharge noted  Resp: Appears to be breathing comfortably without accessory muscle usage, speaking in full sentences, no cough  Neurologic: No apparent tremors, facial movements symmetric  Psych: Affect bright, alert and oriented     LABS:  I reviewed the following labs:  Lab Results   Component Value Date    WBC 7.9 11/02/2024    HGB 9.2 (L) 11/02/2024    HCT 30.2 (L) 11/02/2024     11/02/2024     11/02/2024    INR 1.26 (H) 11/01/2024    PTT 30 11/01/2024     Lab Results   Component Value Date     11/02/2024    POTASSIUM 4.8 11/02/2024    CR 0.74 11/02/2024    BUN 24.9 (H) 11/02/2024    CHLORIDE 106 11/07/2024    ERIN 8.2 (L) 11/02/2024     (H) 11/05/2024      Lab Results   Component Value Date    ALT 24 10/27/2024    AST 45 10/27/2024    ALKPHOS 110 10/27/2024    BILITOTAL 0.5 10/27/2024    BILIDIRECT 0.15 08/24/2015      Lab Results   Component Value Date     (H) 04/16/2024    URIC 5.9 04/16/2024      10/25/24 CT CAP:  IMPRESSION:  1.  Interval development of new hazy ill-defined pulmonary nodules involving the right upper lobe and right middle lobe having the appearance of an acute infectious/inflammatory etiology.  2.  Interval decrease in prior consolidation posterior right lower lobe with minimal residual atelectasis remaining right lower lobe. Minimal atelectasis left lower lobe with chronic left basilar pleural fluid remaining stable.  3.   No new lymphadenopathy.  4.  Unchanged pancreatic cyst.  5.  Heterogeneous enhancement to a moderately enlarged prostate.    Assessment & Plan   #Stage IE PTLD of duodenum, late-onset/EBV-negative  #Melena/acute blood anemia 2/2 above  Pleasant 68 year old male w/ hx of lung transplant in 2013 who presented with melena/acute blood anemia and was found to have a 5 cm segment of ulcerated/friable mucosa in the second portion of the duodenum on colonoscopy in 3/2024. Biopsy showed DLBCL, germinal center subtype, EBV negative. FISH negative for MYC and BCL6 rearrangements. 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   - We previously reviewed the overall diagnosis and prognosis of DLBCL/PTLD. Given his other significant comorbidities, started with single-agent rituximab weekly x 4 doses. His immunosuppression has already been reduced with stopping of azathioprine, which is also a key part of treatment.   - Completed 4 doses of weekly rituximab from 4/25/24 to 5/15/24, tolerated well. 6/26/24 restaging PET showed good partial response with significantly decreased hypermetabolism in distal duodenum (SUVmax 23.8 -> 6.0). Still slightly above liver uptake. Was considering consolidative rituximab q2 months x 4 more doses but given issues with non-healing foot ulcers and upcoming vascular surgery, we decided to hold off and just monitor.   - He canceled surveillance PET given that he would not be able to get more treatment right now anyway d/t all the issues below but 10/25/24 CT CAP that he had while in the hospital did not show any bowel thickening/inflammation.   - Continue surveillance, plan to see him back in 6 months with repeat PET if he is otherwise doing okay.      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 Appreciate them stopping  azathioprine already.  - Now just on tacrolimus and low-dose prednisone for immunosuppresion. Mgmt per Transplant team.   - Ppx: azithro, dapsone.      #CAD s/p staged PCI Jan ([facility] to LAD)/Feb 2024 ([facility] to RCA)  #PAD and critical limb ischemia s/p bilateral BKA's (R 8/28/24, L 9/25/24)  #Recurrent DVT/PE, hx of HIT  Following with Cardiology and Vascular Medicine. Was already on aspirin/Plavix for PAD when he developed unstable angina and was found to have multiple blockages, had staged PCI. Had non-healing ulcers and critical limb ischemia this summer and had to undergo bilateral below the knee amputations. Course c/b recurrent PE.   - On aspirin and now back on Eliquis.       PLAN:  - RTC in 6 months with PET    Total of 30 minutes on patient visit, reviewing records, interpreting test results, placing orders, and documentation on the day of service.    The longitudinal plan of care for the diagnosis(es)/condition(s) as documented were addressed during this visit. Due to the added complexity in care, I will continue to support Aubrey in the subsequent management and with ongoing continuity of care.     Amber Drake MD  Attending Physician, [facility]

## 2024-11-14 NOTE — LETTER
11/14/2024      Aubrey Duncan  [address] [facility] [address]      Dear Colleague,    Thank you for referring your patient, Aubrey Duncan, to the [facility]. Please see a copy of my visit note below.    Virtual Visit Details    Type of service:  Video Visit   Video Start Time:  2:40 PM  Video End Time: 2:52 PM    Originating Location (pt. Location): Home    Distant Location (provider location):  On-site  Platform used for Video Visit: [facility]  FOLLOW-UP VISIT NOTE  Nov 14, 2024  Subjective  REASON FOR VISIT: Follow up PTLD    ONCOLOGIC SUMMARY:  Diagnosis:  Stage IE PTLD/DLBCL dx 3/2024 in setting of bilateral lung transplant in 2013. Presented with melena and acute blood loss anemia (Hgb down to 8.2 from baseline of 11-12). EGD revealed a 5 cm segment of ulcerated and friable mucosa in the second segment of the duodenum. There was no active bleeding and the area was too diffuse to be treated endoscopically, but the area was biopsied. Case was discussed with Cardiology who recommended to continue DAPT with aspirin and Plavix given recent cardiac stenting (2/16/24). Pathology showed diffuse large B-cell lymphoma, GCB-subtype, with Ki67 80-90%. JUNG DAKOTA negative. FISH negative for MYC and BCL6 rearrangements. Staging PET showed uptake in distal duodenum (SUVmax 23.8) and LLQ small bowel (SUVmax 14.4) only.     Intent of treatment: Curative    Treatment history:  4/25/24 to 5/15/24: weekly rituximab x 4 doses. EOT PET showed WV. Further rituximab consolidation deferred d/t wound healing issues and need for amputation.     INTERVAL HISTORY:  Aubrey is seen for video follow up today. He originally canceled our follow up PET and video visit because of all of his other ongoing medical issues. Had a difficult summer with multiple hospitalizations for RLE wound infection s/p R BKA 8/28/24, bilateral critical limb ischemia s/p L BKA 9/25/24, recurrent PE, and  "mostly recently septic shock from pneumonia vs UTI.     He just got discharged from TCU and is staying with his daughter currently. Feeling drained but no acute new symptoms such as fevers, night sweats, SOB, N/V, bleeding. Still getting used to his prosthetics.     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 Tolerates Dilaudid well.      Fluocinolone Other (See Comments)     Tendon problems       Gabapentin Nausea and Vomiting     Levaquin Muscle Pain (Myalgia)     Pneumococcal Vaccine Swelling     Fever and \"My arm swelled up like a balloon.\"     Varicella Zoster Immune Globulin Swelling       Current Outpatient Medications:      acetaminophen (TYLENOL) 325 MG tablet, Take 2 tablets (650 mg) by mouth every 6 hours as needed for mild pain, Disp: 60 tablet, Rfl: 0     albuterol (PROAIR HFA/PROVENTIL HFA/VENTOLIN HFA) 108 (90 Base) MCG/ACT inhaler, Inhale 1-2 puffs into the lungs every 6 hours as needed for shortness of breath or wheezing, Disp: 8.5 g, Rfl: 3     apixaban ANTICOAGULANT (ELIQUIS) 5 MG tablet, Take 1 tablet (5 mg) by mouth 2 times daily., Disp: , Rfl:      aspirin (ASA) 81 MG EC tablet, Take 1 tablet (81 mg) by mouth daily, Disp: 90 tablet, Rfl: 3     azithromycin (ZITHROMAX) 250 MG tablet, Take 250 mg by mouth Every Mon, Wed, Fri Morning, Disp: , Rfl:      bisacodyl (DULCOLAX) 10 MG suppository, Place 1 " suppository (10 mg) rectally daily as needed for constipation (Use if magnesium hydroxide (MILK of MAGNESIA) is not effective after 24 hours. May discontinue if patient having bowel movement.)., Disp: , Rfl:      blood glucose (NO BRAND SPECIFIED) test strip, USE TO TEST BLOOD SUGAR 3 TIMES DAILY. DIAG CODE: E11.9, Disp: 300 strip, Rfl: 3     Calcium Carbonate-Vitamin D 600-10 MG-MCG TABS, Take 1 tablet by mouth 2 times daily (with meals), Disp: 60 tablet, Rfl: 11     carvedilol (COREG) 6.25 MG tablet, TAKE 1 TABLET (6.25 MG) BY MOUTH 2 TIMES DAILY (WITH MEALS), Disp: 120 tablet, Rfl: 3     dapsone (ACZONE) 25 MG tablet, Take 2 tablets (50 mg) by mouth daily, Disp: 180 tablet, Rfl: 3     diclofenac (VOLTAREN) 1 % topical gel, Apply 2 g topically 2 times daily as needed for moderate pain, Disp: , Rfl:      Ferrous Sulfate Dried (HIGH POTENCY IRON) 65 MG TABS, Take 1 tablet by mouth every morning., Disp: , Rfl:      fluticasone-salmeterol (ADVAIR-HFA) 230-21 MCG/ACT inhaler, Inhale 2 puffs into the lungs 2 times daily, Disp: 8 g, Rfl: 11     furosemide (LASIX) 20 MG tablet, Take 1 tablet (20 mg) by mouth daily, Disp: 90 tablet, Rfl: 4     HYDROmorphone (DILAUDID) 2 MG tablet, Take 0.5 tablets (1 mg) by mouth every 6 hours as needed for severe pain., Disp: 15 tablet, Rfl:      insulin aspart (NOVOLOG FLEXPEN) 100 UNIT/ML pen, Inject 3 Units subcutaneously daily (with breakfast). Do not give if blood sugar < 70 mg/dL., Disp: , Rfl:      insulin aspart (NOVOLOG PEN) 100 UNIT/ML pen, Inject 3 Units subcutaneously 2 times daily (with meals). Lunch & supper, Disp: , Rfl:      insulin glargine (LANTUS PEN) 100 UNIT/ML pen, Inject 6 Units subcutaneously every morning (before breakfast)., Disp: , Rfl:      insulin pen needle (32G X 4 MM) 32G X 4 MM miscellaneous, Use 4 pen needles daily or as directed. Dispense as insurance allows. Dx. Code: E09.9, Disp: 400 each, Rfl: 11     loperamide (IMODIUM) 2 MG capsule, Take 1 capsule  (2 mg) by mouth 4 times daily as needed for diarrhea, Disp: 120 capsule, Rfl: 12     losartan (COZAAR) 50 MG tablet, Take 1 tablet (50 mg) by mouth daily., Disp: , Rfl:      magnesium hydroxide (MILK OF MAGNESIA) 400 MG/5ML suspension, Take 30 mLs by mouth daily as needed for constipation (Use if polyethylene glycol (Miralax) is not effective after 24 hours.)., Disp: , Rfl:      magnesium oxide (MAG-OX) 400 MG tablet, Take 2 tablets (800 mg) by mouth daily., Disp: , Rfl:      melatonin 1 MG TABS tablet, Take 1 tablet (1 mg) by mouth nightly as needed for sleep., Disp: , Rfl:      metoclopramide (REGLAN) 5 MG tablet, Take 1 tablet (5 mg) by mouth every 6 hours as needed (nausea), Disp: 30 tablet, Rfl: 0     Microlet Lancets MISC, CHECK BLOOD SUGAR FOUR TIMES DAILY E11.9, Disp: 400 each, Rfl: 1     montelukast (SINGULAIR) 10 MG tablet, Take 1 tablet (10 mg) by mouth every evening, Disp: 90 tablet, Rfl: 3     multivitamin w/minerals (THERA-VIT-M) tablet, Place 1 tablet into Feeding Tube daily (with lunch)., Disp: , Rfl:      multivitamin, therapeutic (THERA-VIT) TABS, Take 1 tablet by mouth daily, Disp: 30 tablet, Rfl: 12     order for DME, Equipment being ordered: diabetic shoes, Disp: 1 each, Rfl: 0     pantoprazole (PROTONIX) 40 MG EC tablet, Take 1 tablet (40 mg) by mouth daily, Disp: 90 tablet, Rfl: 1     predniSONE (DELTASONE) 5 MG tablet, Take 1 tablet by mouth every morning., Disp: , Rfl:      predniSONE (DELTASONE) 5 MG tablet, Take 0.5 tablets by mouth every evening., Disp: , Rfl:      psyllium (METAMUCIL/KONSYL) capsule, Take 2 capsules by mouth 2 times daily., Disp: , Rfl:      rosuvastatin (CRESTOR) 40 MG tablet, Take 20 mg by mouth Every Mon, Wed, Fri Morning, Disp: , Rfl:      senna-docusate (SENOKOT-S/PERICOLACE) 8.6-50 MG tablet, Take 1 tablet by mouth 2 times daily as needed for constipation., Disp: , Rfl:      spironolactone (ALDACTONE) 25 MG tablet, Take 0.5 tablets (12.5 mg) by mouth daily., Disp:  , Rfl:      tacrolimus (GENERIC EQUIVALENT) 1 MG capsule, Take 2 capsules (2 mg) by mouth 2 times daily., Disp: , Rfl:      thiamine (B-1) 100 MG tablet, Take 1 tablet (100 mg) by mouth daily., Disp: , Rfl:      traMADol 25 MG TABS tablet, Take 1 tablet (25 mg) by mouth every 4 hours as needed for moderate pain., Disp: 20 tablet, Rfl: 0     venlafaxine (EFFEXOR XR) 37.5 MG 24 hr capsule, Take 37.5 mg by mouth daily., Disp: , Rfl:      wound support modular (EXPEDITE) LIQD bottle, Take 60 mLs by mouth daily., Disp: , Rfl:     REVIEW OF SYSTEMS:  10-point ROS reviewed and negative other than that mentioned in HPI.     Objective  ECOG PS: 2     PHYSICAL EXAM:  **Limited given video visit**  General: Patient appears well in no acute distress.   Skin: No rashes or lesions on visualized skin  Eyes: EOMI, no erythema, sclera icterus or discharge noted  Resp: Appears to be breathing comfortably without accessory muscle usage, speaking in full sentences, no cough  Neurologic: No apparent tremors, facial movements symmetric  Psych: Affect bright, alert and oriented     LABS:  I reviewed the following labs:  Lab Results   Component Value Date    WBC 7.9 11/02/2024    HGB 9.2 (L) 11/02/2024    HCT 30.2 (L) 11/02/2024     11/02/2024     11/02/2024    INR 1.26 (H) 11/01/2024    PTT 30 11/01/2024     Lab Results   Component Value Date     11/02/2024    POTASSIUM 4.8 11/02/2024    CR 0.74 11/02/2024    BUN 24.9 (H) 11/02/2024    CHLORIDE 106 11/07/2024    ERIN 8.2 (L) 11/02/2024     (H) 11/05/2024      Lab Results   Component Value Date    ALT 24 10/27/2024    AST 45 10/27/2024    ALKPHOS 110 10/27/2024    BILITOTAL 0.5 10/27/2024    BILIDIRECT 0.15 08/24/2015      Lab Results   Component Value Date     (H) 04/16/2024    URIC 5.9 04/16/2024      10/25/24 CT CAP:  IMPRESSION:  1.  Interval development of new hazy ill-defined pulmonary nodules involving the right upper lobe and right middle lobe  having the appearance of an acute infectious/inflammatory etiology.  2.  Interval decrease in prior consolidation posterior right lower lobe with minimal residual atelectasis remaining right lower lobe. Minimal atelectasis left lower lobe with chronic left basilar pleural fluid remaining stable.  3.  No new lymphadenopathy.  4.  Unchanged pancreatic cyst.  5.  Heterogeneous enhancement to a moderately enlarged prostate.    Assessment & Plan  #Stage IE PTLD of duodenum, late-onset/EBV-negative  #Melena/acute blood anemia 2/2 above  Pleasant 68 year old male w/ hx of lung transplant in 2013 who presented with melena/acute blood anemia and was found to have a 5 cm segment of ulcerated/friable mucosa in the second portion of the duodenum on colonoscopy in 3/2024. Biopsy showed DLBCL, germinal center subtype, EBV negative. FISH negative for MYC and BCL6 rearrangements. 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   - We previously reviewed the overall diagnosis and prognosis of DLBCL/PTLD. Given his other significant comorbidities, started with single-agent rituximab weekly x 4 doses. His immunosuppression has already been reduced with stopping of azathioprine, which is also a key part of treatment.   - Completed 4 doses of weekly rituximab from 4/25/24 to 5/15/24, tolerated well. 6/26/24 restaging PET showed good partial response with significantly decreased hypermetabolism in distal duodenum (SUVmax 23.8 -> 6.0). Still slightly above liver uptake. Was considering consolidative rituximab q2 months x 4 more doses but given issues with non-healing foot ulcers and upcoming vascular surgery, we decided to hold off and just monitor.   - He canceled surveillance PET given that he would not be able to get more treatment right now anyway d/t all the issues below but 10/25/24 CT CAP that he had  while in the hospital did not show any bowel thickening/inflammation.   - Continue surveillance, plan to see him back in 6 months with repeat PET if he is otherwise doing okay.      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 Appreciate them stopping azathioprine already.  - Now just on tacrolimus and low-dose prednisone for immunosuppresion. Mgmt per Transplant team.   - Ppx: azithro, dapsone.      #CAD s/p staged PCI Jan (MEGHNA to LAD)/Feb 2024 (MEGHNA to RCA)  #PAD and critical limb ischemia s/p bilateral BKA's (R 8/28/24, L 9/25/24)  #Recurrent DVT/PE, hx of HIT  Following with Cardiology and Vascular Medicine. Was already on aspirin/Plavix for PAD when he developed unstable angina and was found to have multiple blockages, had staged PCI. Had non-healing ulcers and critical limb ischemia this summer and had to undergo bilateral below the knee amputations. Course c/b recurrent PE.   - On aspirin and now back on Eliquis.       PLAN:  - RTC in 6 months with PET    Total of 30 minutes on patient visit, reviewing records, interpreting test results, placing orders, and documentation on the day of service.    The longitudinal plan of care for the diagnosis(es)/condition(s) as documented were addressed during this visit. Due to the added complexity in care, I will continue to support Aubrey in the subsequent management and with ongoing continuity of care.     Amber Drake MD  Attending Physician, [facility]       Again, thank you for allowing me to participate in the care of your patient.        Sincerely,        Amber Drake MD

## 2024-11-14 NOTE — TELEPHONE ENCOUNTER
Pt discharge home today  Will be doing home PT/OT/RN   Staying with daughter in [place], selling their home up [place].     [facility]- lab   Pt will be establishing with new PCP in [place].     K+ 5.4  Reviewed with Dr. Bishop  -angie x2 doses. Pt unable to get recheck lab tomorrow due to no ride. Will recheck labs on Monday

## 2024-11-15 ENCOUNTER — TELEPHONE (OUTPATIENT)
Dept: TRANSPLANT | Facility: CLINIC | Age: 69
End: 2024-11-15
Payer: MEDICARE

## 2024-11-15 ENCOUNTER — TELEPHONE (OUTPATIENT)
Dept: FAMILY MEDICINE | Facility: OTHER | Age: 69
End: 2024-11-15
Payer: MEDICARE

## 2024-11-15 RX ORDER — INSULIN LISPRO-AABC 100 [IU]/ML
3 INJECTION, SOLUTION INTRAVENOUS; SUBCUTANEOUS
Qty: 10 ML | Refills: 1 | OUTPATIENT
Start: 2024-11-15

## 2024-11-15 NOTE — TELEPHONE ENCOUNTER
Patient received letter from insurance for alternatives for the following medications.     Called insurance at [phone]    Rx plan ID number [id number]    Insurance Alternatives include-  Novolog Flexpen: Lyumjev Kwikpen  Tramadol HCl Tab: Hydrocodone/APAP     Albuterol 108 inhaler: says it is covered but there is a quantity restriction  max daily dose 1.20  Fluticason/salm 203-21: lower dose 113/14 mcg.     - - - - - - - - - - - - - - - - - - - - - - - - - - - - - - - - - - - -     Do you want to decrease the Fluticasone/salmeterol 230-21 down to the 113-14 mcg dose? If not, I can call and try and do a Formulary Exception over the phone.     Tushar'd up Lyumjev Kwikpen to replace the Novolog (unsure if it would be the same dosing).     Will have pt follow up with Dr Murphy regarding Albuterol inhaler.       Routing to provider to review and respond.  Raghav Daley RN on 11/15/2024 at 3:28 PM

## 2024-11-15 NOTE — TELEPHONE ENCOUNTER
Home care called.   Major drug to drug interactions:  Albuterol sulfate interacts with carvedilol   Carvedilol interacts with fluticasone  metoclopramide  interacts with venlafaxine  Potassium interacts with the spironolactone  Spironolactone interacts with tacrolimus     Please call monday to acknowledge all of this

## 2024-11-15 NOTE — TELEPHONE ENCOUNTER
Returned call to Home Care. They are needing verbal orders from patients primary.    Yohana Barreto LPN on 11/15/2024 at 3:19 PM

## 2024-11-15 NOTE — TELEPHONE ENCOUNTER
Admitted [facility] for home care and will be receiving PT, OT  eval and treat, home health aide twice a week and nursing related to wound care for bilateral bka and new medication education.      FYI:   wife set up his meds yesterday and he took a lisinopril this morning this med  was discontinued).

## 2024-11-15 NOTE — TELEPHONE ENCOUNTER
General  Route to LPN    Reason for call: Pt would like his lab orders to r[facility] fax [phone] the last order that was put in     Call back needed? Yes    Return Call Needed  Same as documented in contacts section  When to return call?: Greater than one day: Route standard priority

## 2024-11-18 NOTE — TELEPHONE ENCOUNTER
Medications reactions noted-not new medications. Okay to continue current. Even though I am PCP and signing for homecare, I have not seen patient/cared for him since recent IP stays and surgeries. BRANDI Manning CNP on 11/18/2024 at 12:06 PM

## 2024-11-18 NOTE — TELEPHONE ENCOUNTER
Novolog is typically done through patient assistance [employer]. Are they not using this any longer?    I would recommend inhalers be addressed through Dr Murphy due to lung transplant history. BRANDI Manning CNP on 11/18/2024 at 12:26 PM

## 2024-11-19 ENCOUNTER — TELEPHONE (OUTPATIENT)
Dept: TRANSPLANT | Facility: CLINIC | Age: 69
End: 2024-11-19
Payer: MEDICARE

## 2024-11-19 NOTE — TELEPHONE ENCOUNTER
Pt PCP provider called would like to know if Lab orders are weekly or monthly message can be left with the .

## 2024-11-19 NOTE — TELEPHONE ENCOUNTER
Returned call to [facility] in [facility] for weekly labs x3 (including this week), then monthly

## 2024-11-20 ENCOUNTER — TELEPHONE (OUTPATIENT)
Dept: FAMILY MEDICINE | Facility: OTHER | Age: 69
End: 2024-11-20
Payer: MEDICARE

## 2024-11-20 NOTE — TELEPHONE ENCOUNTER
Spoke with Vanessa and explained to her that Aubrey has a new PCP in [place] and to contact him for orders per patient and his wife's request.  Norma J. Gosselin, LPN .......  11/20/2024  3:06 PM

## 2024-11-20 NOTE — TELEPHONE ENCOUNTER
[facility] is looking for PT orders  Verbal PT orders for 1 time a week for 5 weeks    Please call and advise    Thank You     Belen Jackson on 11/20/2024 at 1:00 PM

## 2024-11-20 NOTE — TELEPHONE ENCOUNTER
Spoke with patient and his wife Kyung,  and after name and date of birth they said that Aubrey has a new PCP Richard Pedroza, JUN at [place], [place]. Hoa doesn't need to do anything.  He uses [facility].  Norma J. Gosselin, LPN .......  11/20/2024  2:18 PM

## 2024-11-21 ENCOUNTER — MEDICAL CORRESPONDENCE (OUTPATIENT)
Dept: HEALTH INFORMATION MANAGEMENT | Facility: OTHER | Age: 69
End: 2024-11-21
Payer: MEDICARE

## 2024-11-22 PROBLEM — C44.91 BASAL CELL CARCINOMA: Status: ACTIVE | Noted: 2024-02-06

## 2024-11-22 PROBLEM — C44.92 SCC (SQUAMOUS CELL CARCINOMA): Status: ACTIVE | Noted: 2024-02-06

## 2024-11-25 DIAGNOSIS — Z94.2 LUNG REPLACED BY TRANSPLANT (H): ICD-10-CM

## 2024-11-25 DIAGNOSIS — Z94.2 S/P LUNG TRANSPLANT (H): Chronic | ICD-10-CM

## 2024-11-25 RX ORDER — MONTELUKAST SODIUM 10 MG/1
10 TABLET ORAL EVERY EVENING
Qty: 90 TABLET | Refills: 3 | Status: SHIPPED | OUTPATIENT
Start: 2024-11-25

## 2024-11-25 RX ORDER — TACROLIMUS 1 MG/1
CAPSULE ORAL
Qty: 90 CAPSULE | Refills: 11 | Status: SHIPPED | OUTPATIENT
Start: 2024-11-25

## 2024-11-25 RX ORDER — PREDNISONE 5 MG/1
5 TABLET ORAL EVERY MORNING
Qty: 30 TABLET | Refills: 3 | Status: SHIPPED | OUTPATIENT
Start: 2024-11-25

## 2024-11-25 RX ORDER — AZITHROMYCIN 250 MG/1
250 TABLET, FILM COATED ORAL
Qty: 36 TABLET | Refills: 3 | Status: SHIPPED | OUTPATIENT
Start: 2024-11-25

## 2024-11-25 RX ORDER — TACROLIMUS 0.5 MG/1
0.5 CAPSULE ORAL DAILY
Qty: 30 CAPSULE | Refills: 11 | Status: SHIPPED | OUTPATIENT
Start: 2024-11-25

## 2024-11-25 NOTE — TELEPHONE ENCOUNTER
Patient Call: Medication Refill  Route to LPN  Instruct the patient to first contact their pharmacy. If they have called their pharmacy and require further assistance, route to LPN.    Pharmacy Name: [facility]    Pharmacy Location:  [place] [address] [address]        Name of Medication & Dose:     azithromycin (ZITHROMAX) 250 MG tablet  montelukast (SINGULAIR) 10 MG tablet  predniSONE (DELTASONE) 5 MG tablet  predniSONE (DELTASONE) 5 MG tablet  tacrolimus (GENERIC EQUIVALENT) 0.5 MG capsule  tacrolimus (GENERIC EQUIVALENT) 1 MG capsule          When will the patient be out of this medication?: Less than 24 hours ([facility] LPN, then page if no answer)

## 2024-11-26 ENCOUNTER — TELEPHONE (OUTPATIENT)
Dept: FAMILY MEDICINE | Facility: OTHER | Age: 69
End: 2024-11-26
Payer: MEDICARE

## 2024-11-26 DIAGNOSIS — T87.81 DEHISCENCE OF AMPUTATION STUMP (H): Primary | ICD-10-CM

## 2024-11-26 NOTE — TELEPHONE ENCOUNTER
Hoa PAZ, CNP  reviewed and completed the following home care or hospice form(s) for Aubrey THORPE Perla  on November 26, 2024 .  This covers the certification period effective 11/15/24-01/13/25.    [facility]

## 2024-11-26 NOTE — TELEPHONE ENCOUNTER
Flora with [facility] requests verbal orders:    Home OT eval and treat the week of 12/02/2024.      Okay to leave detailed message.      Patria Mandel on 11/26/2024 at 11:33 AM

## 2024-11-27 ENCOUNTER — PRE VISIT (OUTPATIENT)
Dept: CARDIOLOGY | Facility: CLINIC | Age: 69
End: 2024-11-27
Payer: MEDICARE

## 2024-11-27 DIAGNOSIS — I50.22 CHRONIC SYSTOLIC HEART FAILURE (H): Primary | ICD-10-CM

## 2024-12-02 ENCOUNTER — TELEPHONE (OUTPATIENT)
Dept: TRANSPLANT | Facility: CLINIC | Age: 69
End: 2024-12-02
Payer: MEDICARE

## 2024-12-02 NOTE — TELEPHONE ENCOUNTER
Patient Call: General  Route to LPN    Reason for call: Pt called to speak to RNCC. Pt is currently at lab, but lab does not have  kits to take tacrolimus level. Pt also mentions he has not heard his tac level results from last week yet.    Writer relayed instructions from RNCC to have other labs done and postpone tacrolimus until the  kits are obtained. Please follow up at earliest convenience.    Call back needed? Yes    Return Call Needed  Same as documented in contacts section  When to return call?: Same day: Route High Priority

## 2024-12-02 NOTE — TELEPHONE ENCOUNTER
Pt checked regular tx labs this AM.  Plan to repeat tacrolimus level when pt gets more tacrolimus  kits.  Pt's tacro level from last week was sent to [facility] lab instead of  lab.  No result yet.

## 2024-12-04 ENCOUNTER — TELEPHONE (OUTPATIENT)
Dept: TRANSPLANT | Facility: CLINIC | Age: 69
End: 2024-12-04

## 2024-12-04 ENCOUNTER — VIRTUAL VISIT (OUTPATIENT)
Dept: CARDIOLOGY | Facility: CLINIC | Age: 69
End: 2024-12-04
Attending: INTERNAL MEDICINE
Payer: MEDICARE

## 2024-12-04 ENCOUNTER — TELEPHONE (OUTPATIENT)
Dept: CARDIOLOGY | Facility: CLINIC | Age: 69
End: 2024-12-04
Payer: MEDICARE

## 2024-12-04 VITALS
BODY MASS INDEX: 19.62 KG/M2 | HEIGHT: 67 IN | DIASTOLIC BLOOD PRESSURE: 76 MMHG | HEART RATE: 102 BPM | WEIGHT: 125 LBS | SYSTOLIC BLOOD PRESSURE: 146 MMHG

## 2024-12-04 DIAGNOSIS — I50.22 CHRONIC SYSTOLIC HEART FAILURE (H): Primary | ICD-10-CM

## 2024-12-04 DIAGNOSIS — E87.5 HYPERKALEMIA: ICD-10-CM

## 2024-12-04 DIAGNOSIS — I73.9 PAD (PERIPHERAL ARTERY DISEASE) (H): ICD-10-CM

## 2024-12-04 DIAGNOSIS — Z94.2 LUNG REPLACED BY TRANSPLANT (H): Primary | ICD-10-CM

## 2024-12-04 DIAGNOSIS — Z94.2 S/P LUNG TRANSPLANT (H): Chronic | ICD-10-CM

## 2024-12-04 ASSESSMENT — PAIN SCALES - GENERAL: PAINLEVEL_OUTOF10: NO PAIN (0)

## 2024-12-04 NOTE — TELEPHONE ENCOUNTER
Patient Contacted for the patient to call back and schedule the following:    Appointment type: ADV HF   Provider: TERRY  Return date: 12/4/2024  Specialty phone number: [phone] OPT 1  Additional appointment(s) needed: N/A  Additonal Notes: SWITCH TO VIDEO VISIT

## 2024-12-04 NOTE — PATIENT INSTRUCTIONS
"You were seen today in the Cardiovascular Clinic at the [facility].      Cardiology Providers you saw during your visit:  Dr. Ronni Lopez     Recommendations:   Please have an echocardiogram to be completed near home - [employer] by Shopping ([employer])[facility] [employer]. Irene, possibly.  Will review echo results and connect to discuss next steps.           Eat a heart healthy, low sodium diet.  Get 20 to 30 minutes of aerobic exercise 4 to 5 times per week as tolerated. (Examples of aerobic exercise include: walking, bicycling, swimming, running).     Thank you for your visit today!   Please MyChart message or call if you have any questions or concerns.      During Business Hours:  [phone], option # 1 ([place])       After hours, weekends or holidays:   [phone], Option #4  Ask to speak to the On-Call Cardiologist. Inform them you are a heart failure patient at the [place].      Cyn Begum RN BSN CHFN  Cardiology Care Coordinator - [facility]  Questions and [phone]  First press #1 for the Process Relations and then press #4 for \"Your Care Team\" to reach us Cardiology Nurses.                "

## 2024-12-04 NOTE — PROGRESS NOTES
45 min video visit with patient at home and I in office: Doximity    Impression  Lung transplant  Chronic immunosuppression  Steroid dependence  Recent sepsis  Recent bilateral BKA amputations  Reduced albumin  ASHD with stenting  Alpha one anti trypsin defect  Anemia with microcytosis  Intolerance to sulfa  Dapsone    Plan:  Repeat echocardiogram for EF as had sepsis at time which may result in transient decrease EF  Iron, iron binding, ferritin, soluble transfer receptor for microcytic anemia  Pre-albumin level  Hemoglobin A1C level  There is no interim history of increasing shortness of breath, orthopnea, PND, ankle edema, increasing abdominal girth, palpitation, pre-syncope, syncope, bleeding or thromboembolic complications.  The patient is taking medication regularly, following a low salt diet, and exercising regularly as outlined.    Alert, oriented, normal gait and station, normal mental, JVP within normal limits, HJR negative,thyroid not enlarged, mucous membranes clear, abdomen without tenderness, rebound or guarding, skin clear of lesion, PMI normal, S1 S2 regular rhythm, no gallop, no edema       Constitutional: +weight loss, butfever, chills, night sweats  HEENT: without visual changes, swallow difficulties  Pulmonary: history of lung transplant  Cardiac: without chest pain, RHODES, PND, orthopnea, edema, palpitation, pre-syncope, syncope,  GI: without diarrhea, constipation, jaundice, melena, GERD, hematemesis  : with frequency, urgency, dysuria, but no  hematuria  However recent uro-sepsis  Skin: rash, bruise, open lesions  Neuro: without TIA, focal neurologic complaints, seizure, trauma  Ortho: Bilateral BK amputatins  Endocrine: diabetes, thyroid, heat/cold intolerance, polyuria, polyphagia, change bowel habits.  Sleep:no JUSTINA, periodic breathing, fatigue       Latest Reference Range & Units 11/07/24 09:48 11/12/24 12:35   Sodium (External) 137 - 145 mmol/L 136 (L) 137 (E)   Potassium (External) 3.5 -  5.1 mmol/L 4.7 5.4 (H) (E)   Chloride (External) 98 - 107 mmol/L 106 106 (E)   CO2 (External) 22 - 30 mmol/L 23 24 (E)   Urea Nitrogen (External) 7 - 20 mg/dL 23 (H) 38 (H) (E)   Creatinine (External) 0.7 - 1.5 mg/dL 0.6 (L) 0.9 (E)   GFR Estimated (External) >60 ml/min/1.73m2 132.680 83.096 (E)   Calcium (External) 8.4 - 10.2 mg/dL 8.6 9.0 (E)   Anion Gap (External) CALC 7 7 (E)   Magnesium (External) 1.6 - 2.3 mg/dL  1.5 (L) (E)   Albumin (External) 3.5 - 5.0 g/dL 2.8 (L)    Protein Total (External) 6.3 - 8.2 g/dL 5.1 (L)    Alk Phosphatase (External) 38 - 126 U/L 101    ALT (External) 0 - 50 U/L 44    AST (External) 15 - 46 U/L 59 (H)    Bilirubin Total (External) 0.2 - 1.3 g/dL 0.4    BUN/Creatinine Ratio (External) CALC 38 42 (E)   Glucose (External) 74 - 106 mg/dL 128 (H) 116 (H) (E)   WBC Count (External) 5.0 - 10.0 K/uL 10.1 (H) 7.0 (E)   Hemoglobin (External) 14.0 - 18.0 g/dL 9.3 (L) 9.8 (L) (E)   Hematocrit (External) 40.0 - 54.0 % 28.4 (L) 30.3 (L) (E)   Platelet Count (External) 145 - 375 K/uL 296 308 (E)   RBC Count (External) 4.50 - 6.20 M/uL 2.99 (L) 3.15 (L) (E)   MCV (External) 80 - 98 fL 95 96 (E)   MCH (External) 27.0 - 31.0 pg 31.2 (H) 31.1 (H) (E)   MCHC (External) 32.0 - 40.0 g/dL 32.8 32.3 (E)   RDW (External) 11.5 - 14.5 % 15.4 (H) 15.3 (H) (E)   % Neutrophils (External) 43.0 - 76.0 % 72.4 67.2 (E)   % Lymphocytes (External) 0.9 - 44.0 % 19.2 25.7 (E)   Absolute Lymphocytes (External) 0.9 - 5.4 K/uL 1.9 1.8 (E)   %Others (External) 0.0 - 18.0 % 8.4 7.1 (E)   Absolute Neutrophils (External) 1.4 - 9.0 K/uL 7.3 4.7 (E)   Absolute Others (External) 0.0 - 1.8 K/uL 0.9 0.5 (E)       Wt Readings from Last 24 Encounters:   12/04/24 56.7 kg (125 lb)   11/14/24 54.9 kg (121 lb)   11/03/24 57 kg (125 lb 10.6 oz)   10/26/24 64 kg (141 lb 1.5 oz)   09/29/24 64.8 kg (142 lb 13.7 oz)   09/20/24 61.2 kg (135 lb)   09/18/24 60.4 kg (133 lb 3.2 oz)   09/16/24 64.5 kg (142 lb 3.2 oz)   09/12/24 64.5 kg (142 lb  3.2 oz)   24 109.9 kg (242 lb 3.2 oz)   24 62 kg (136 lb 9.6 oz)   24 67.7 kg (149 lb 4 oz)   24 65.3 kg (144 lb)   24 65.3 kg (144 lb)   24 68.2 kg (150 lb 6.4 oz)   24 69.4 kg (153 lb)   24 69.7 kg (153 lb 9.6 oz)   24 70.3 kg (155 lb)   24 74.5 kg (164 lb 3.2 oz)   24 75.8 kg (167 lb)   24 76 kg (167 lb 9.6 oz)   24 77.8 kg (171 lb 9.6 oz)   24 76.7 kg (169 lb)   24 76.6 kg (168 lb 14 oz)        Name: YARED HAMLIN  MRN: [id number]  : 1955  Study Date: 10/28/2024 01:56 PM  Age: 69 yrs  Gender: Male  Patient Location: [facility]  Reason For Study: Dyspnea  Ordering Physician: ZACHARIAH MILLER  Referring Physician: KATE PHILIPPE  Performed By: Carmelita Walsh     BSA: 1.8 m2  Height: 67 in  Weight: 143 lb  HR: 105  BP: 159/97 mmHg  ______________________________________________________________________________  Procedure  Complete Portable Echo Adult. Contrast Optison. Optison (NDC #0163-4173-66)  given intravenously. Patient was given 6 ml mixture of 3 ml Optison and 6 ml  saline. 3 ml wasted. The patient's rhythm is atrial fibrillation.  ______________________________________________________________________________  Interpretation Summary  Left ventricular function is moderately reduced. Biplane EF is 38%.  Global right ventricular function is normal.  No significant valvular abnormalities present.  The inferior vena cava was normal in size with preserved respiratory  variability.  Compared to the prior study on 2024, the LV function is now moderately  reduced.  ______________________________________________________________________________  Left Ventricle  Left ventricular size is normal. Left ventricular wall thickness is normal.  Thickening of the anterobasal septum is present. Left ventricular function is  decreased. The ejection fraction is 35-40% (moderately reduced). Grade I or  early diastolic  dysfunction. Moderate diffuse hypokinesis is present. Regional  wall motion assessment limited by irregular rhythm.     Right Ventricle  The right ventricle is normal size. Global right ventricular function is  normal.     Atria  Both atria appear normal. The atrial septum is intact as assessed by color  Doppler .     Mitral Valve  Trace mitral insufficiency is present.     Aortic Valve  The aortic valve is tricuspid. No aortic regurgitation is present. No aortic  stenosis is present.     Tricuspid Valve  Trace tricuspid insufficiency is present. The right ventricular systolic  pressure is 30mmHg above the right atrial pressure. Pulmonary artery systolic  pressure is normal.     Pulmonic Valve  The pulmonic valve is normal.     Vessels  The aortic root is mildly dilated to 3.7 cm. The ascending aorta is mildly  dilated to 3.7 cm. The inferior vena cava was normal in size with preserved  respiratory variability. The pulmonary artery cannot be assessed.     Pericardium  No pericardial effusion is present.     Miscellaneous  No significant valvular abnormalities present.     Compared to Previous Study  Compared to the prior study on 1/11/2024, the LV function is now moderately  reduced.     Attestation  I have personally viewed the imaging and agree with the interpretation and  report as documented by the fellow, Cornelius Chowdhury, and/or edited by me.  ______________________________________________________________________________  MMode/2D Measurements & Calculations  IVSd: 1.2 cm  LVIDd: 4.5 cm  LVIDs: 4.0 cm  LVPWd: 0.92 cm  FS: 11.6 %  LV mass(C)d: 162.3 grams  LV mass(C)dI: 92.6 grams/m2  Ao root diam: 3.7 cm  asc Aorta Diam: 3.7 cm  LVOT diam: 2.4 cm  LVOT area: 4.6 cm2     EF(MOD-bp): 38.4 %  Ao root diam index Ht(cm/m): 2.2  Ao root diam index BSA (cm/m2): 2.1  Asc Ao diam index BSA (cm/m2): 2.1     Asc Ao diam index Ht(cm/m): 2.2  RWT: 0.41  TAPSE: 1.5 cm     Doppler Measurements & Calculations  MV E max chriss:  39.8 cm/sec  MV A max chriss: 67.3 cm/sec  MV E/A: 0.59  PA acc time: 0.09 sec  TR max chriss: 272.7 cm/sec  TR max P.8 mmHg  E/E' av.0  Lateral E/e': 6.8          cardiomyopathy       Conclusion    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   of the D1 with retrograde collaterals from the apical LAD.  Otherwise mild to moderate non obstructive CAD elsewhere.  No new angiographic lesions to explain the recent / new drop in EF.         Plan     Follow bedrest per protocol   Continued medical management and lifestyle modifications for cardiovascular risk factor optimizations.   Return to the primary inpatient team for further evaluation and managmenet     Coronary Findings    Diagnostic  Dominance: Right  Left Main   The vessel is moderate in size and is angiographically normal.      Left Anterior Descending   The vessel is moderate in size and is angiographically normal.   Ost LAD to Mid LAD lesion is 35% stenosed. The lesion was previously treated using a stent of unknown type.   Dist LAD lesion is 30% stenosed.      First Diagonal Branch   The vessel is moderate in size.   Collaterals   1st Diag filled by collaterals from Dist LAD.      1st Diag lesion is 100% stenosed.      Second Diagonal Branch   The vessel is moderate in size and is angiographically normal.      Left Circumflex   The vessel is moderate in size and is angiographically normal.   Mid Cx lesion is 40% stenosed.      First Obtuse Marginal Branch   The vessel is small.   1st Mrg lesion is 40% stenosed.      Second Obtuse Marginal Branch   The vessel is moderate in size and is angiographically normal.   2nd Mrg lesion is 20% stenosed.      Third Obtuse Marginal Branch   The vessel is small and is angiographically normal.      Right Coronary Artery   The vessel is moderate in size.   Prox RCA lesion is 40% stenosed.   Prox RCA to Mid RCA lesion is 25% stenosed.  The lesion was previously treated using a stent of unknown type.   Mid RCA to Dist RCA lesion is 25% stenosed.      Right Posterior Descending Artery   The vessel is moderate in size and is angiographically normal.      Right Posterior Atrioventricular Artery   The vessel is moderate in size and is angiographically normal.      First Right Posterolateral Branch   The vessel is small and is angiographically normal.      Second Right Posterolateral Branch   The vessel is small and is angiographically normal.         Intervention     No interventions have been documented.     Pressures Phase: Baseline       Time Systolic (mmHg) Diastolic (mmHg) Mean (mmHg) A Wave (mmHg) V Wave (mmHg) EDP (mmHg) Max dp/dt (mmHg/sec) HR (bpm) Content (mL/dL) SAT (%)   AO Pressures  4:31     65    92

## 2024-12-04 NOTE — NURSING NOTE
Patient reviewed medications and allergies in [facility] during e-check in and said everything looked correct.      Current patient location: [address] [place] [address]    Is the patient currently in the state of [place]? YES    Visit mode:VIDEO    If the visit is dropped, the patient can be reconnected by:VIDEO VISIT: Text to cell phone:   Telephone Information:   Mobile [phone]    and VIDEO VISIT: Send to e-mail at: [email or web address]    Will anyone else be joining the visit? Lo's Wife   (If patient encounters technical issues they should call [phone]    Are changes needed to the allergy or medication list? No    Are refills needed on medications prescribed by this physician? NO    Rooming Documentation:  Questionnaire(s) completed    Reason for visit: RECHHARSHA Vizcaino [facility]

## 2024-12-04 NOTE — LETTER
12/4/2024      RE: Aubrey Duncan  [id number] [id number]       Dear Colleague,    Thank you for the opportunity to participate in the care of your patient, Aubrey Duncan, at the [facility] [place] [facility]. Please see a copy of my visit note below.    45 min video visit with patient at home and I in office: Doximity    Impression  Lung transplant  Chronic immunosuppression  Steroid dependence  Recent sepsis  Recent bilateral BKA amputations  Reduced albumin  ASHD with stenting  Alpha one anti trypsin defect  Anemia with microcytosis  Intolerance to sulfa  Dapsone    Plan:  Repeat echocardiogram for EF as had sepsis at time which may result in transient decrease EF  Iron, iron binding, ferritin, soluble transfer receptor for microcytic anemia  Pre-albumin level  Hemoglobin A1C level  There is no interim history of increasing shortness of breath, orthopnea, PND, ankle edema, increasing abdominal girth, palpitation, pre-syncope, syncope, bleeding or thromboembolic complications.  The patient is taking medication regularly, following a low salt diet, and exercising regularly as outlined.    Alert, oriented, normal gait and station, normal mental, JVP within normal limits, HJR negative,thyroid not enlarged, mucous membranes clear, abdomen without tenderness, rebound or guarding, skin clear of lesion, PMI normal, S1 S2 regular rhythm, no gallop, no edema       Constitutional: +weight loss, butfever, chills, night sweats  HEENT: without visual changes, swallow difficulties  Pulmonary: history of lung transplant  Cardiac: without chest pain, RHODES, PND, orthopnea, edema, palpitation, pre-syncope, syncope,  GI: without diarrhea, constipation, jaundice, melena, GERD, hematemesis  : with frequency, urgency, dysuria, but no  hematuria  However recent uro-sepsis  Skin: rash, bruise, open lesions  Neuro: without TIA, focal neurologic complaints,  seizure, trauma  Ortho: Bilateral BK amputatins  Endocrine: diabetes, thyroid, heat/cold intolerance, polyuria, polyphagia, change bowel habits.  Sleep:no JUSTINA, periodic breathing, fatigue       Latest Reference Range & Units 11/07/24 09:48 11/12/24 12:35   Sodium (External) 137 - 145 mmol/L 136 (L) 137 (E)   Potassium (External) 3.5 - 5.1 mmol/L 4.7 5.4 (H) (E)   Chloride (External) 98 - 107 mmol/L 106 106 (E)   CO2 (External) 22 - 30 mmol/L 23 24 (E)   Urea Nitrogen (External) 7 - 20 mg/dL 23 (H) 38 (H) (E)   Creatinine (External) 0.7 - 1.5 mg/dL 0.6 (L) 0.9 (E)   GFR Estimated (External) >60 ml/min/1.73m2 132.680 83.096 (E)   Calcium (External) 8.4 - 10.2 mg/dL 8.6 9.0 (E)   Anion Gap (External) CALC 7 7 (E)   Magnesium (External) 1.6 - 2.3 mg/dL  1.5 (L) (E)   Albumin (External) 3.5 - 5.0 g/dL 2.8 (L)    Protein Total (External) 6.3 - 8.2 g/dL 5.1 (L)    Alk Phosphatase (External) 38 - 126 U/L 101    ALT (External) 0 - 50 U/L 44    AST (External) 15 - 46 U/L 59 (H)    Bilirubin Total (External) 0.2 - 1.3 g/dL 0.4    BUN/Creatinine Ratio (External) CALC 38 42 (E)   Glucose (External) 74 - 106 mg/dL 128 (H) 116 (H) (E)   WBC Count (External) 5.0 - 10.0 K/uL 10.1 (H) 7.0 (E)   Hemoglobin (External) 14.0 - 18.0 g/dL 9.3 (L) 9.8 (L) (E)   Hematocrit (External) 40.0 - 54.0 % 28.4 (L) 30.3 (L) (E)   Platelet Count (External) 145 - 375 K/uL 296 308 (E)   RBC Count (External) 4.50 - 6.20 M/uL 2.99 (L) 3.15 (L) (E)   MCV (External) 80 - 98 fL 95 96 (E)   MCH (External) 27.0 - 31.0 pg 31.2 (H) 31.1 (H) (E)   MCHC (External) 32.0 - 40.0 g/dL 32.8 32.3 (E)   RDW (External) 11.5 - 14.5 % 15.4 (H) 15.3 (H) (E)   % Neutrophils (External) 43.0 - 76.0 % 72.4 67.2 (E)   % Lymphocytes (External) 0.9 - 44.0 % 19.2 25.7 (E)   Absolute Lymphocytes (External) 0.9 - 5.4 K/uL 1.9 1.8 (E)   %Others (External) 0.0 - 18.0 % 8.4 7.1 (E)   Absolute Neutrophils (External) 1.4 - 9.0 K/uL 7.3 4.7 (E)   Absolute Others (External) 0.0 - 1.8 K/uL  0.9 0.5 (E)       Wt Readings from Last 24 Encounters:   24 56.7 kg (125 lb)   24 54.9 kg (121 lb)   24 57 kg (125 lb 10.6 oz)   10/26/24 64 kg (141 lb 1.5 oz)   24 64.8 kg (142 lb 13.7 oz)   24 61.2 kg (135 lb)   24 60.4 kg (133 lb 3.2 oz)   24 64.5 kg (142 lb 3.2 oz)   24 64.5 kg (142 lb 3.2 oz)   24 109.9 kg (242 lb 3.2 oz)   24 62 kg (136 lb 9.6 oz)   24 67.7 kg (149 lb 4 oz)   24 65.3 kg (144 lb)   24 65.3 kg (144 lb)   24 68.2 kg (150 lb 6.4 oz)   24 69.4 kg (153 lb)   24 69.7 kg (153 lb 9.6 oz)   24 70.3 kg (155 lb)   24 74.5 kg (164 lb 3.2 oz)   24 75.8 kg (167 lb)   24 76 kg (167 lb 9.6 oz)   24 77.8 kg (171 lb 9.6 oz)   24 76.7 kg (169 lb)   24 76.6 kg (168 lb 14 oz)        Name: YARED HAMLIN  MRN: [id number]  : 1955  Study Date: 10/28/2024 01:56 PM  Age: 69 yrs  Gender: Male  Patient [facility]: [facility]  Reason For Study: Dyspnea  Ordering Physician: ZACHARIAH MILLER  Referring Physician: KATE PHILIPPE  Performed By: Carmelita Walsh     BSA: 1.8 m2  Height: 67 in  Weight: 143 lb  HR: 105  BP: 159/97 mmHg  ______________________________________________________________________________  Procedure  Complete Portable Echo Adult. Contrast Optison. Optison (NDC #3831-9804-73)  given intravenously. Patient was given 6 ml mixture of 3 ml Optison and 6 ml  saline. 3 ml wasted. The patient's rhythm is atrial fibrillation.  ______________________________________________________________________________  Interpretation Summary  Left ventricular function is moderately reduced. Biplane EF is 38%.  Global right ventricular function is normal.  No significant valvular abnormalities present.  The inferior vena cava was normal in size with preserved respiratory  variability.  Compared to the prior study on 2024, the LV function is now  moderately  reduced.  ______________________________________________________________________________  Left Ventricle  Left ventricular size is normal. Left ventricular wall thickness is normal.  Thickening of the anterobasal septum is present. Left ventricular function is  decreased. The ejection fraction is 35-40% (moderately reduced). Grade I or  early diastolic dysfunction. Moderate diffuse hypokinesis is present. Regional  wall motion assessment limited by irregular rhythm.     Right Ventricle  The right ventricle is normal size. Global right ventricular function is  normal.     Atria  Both atria appear normal. The atrial septum is intact as assessed by color  Doppler .     Mitral Valve  Trace mitral insufficiency is present.     Aortic Valve  The aortic valve is tricuspid. No aortic regurgitation is present. No aortic  stenosis is present.     Tricuspid Valve  Trace tricuspid insufficiency is present. The right ventricular systolic  pressure is 30mmHg above the right atrial pressure. Pulmonary artery systolic  pressure is normal.     Pulmonic Valve  The pulmonic valve is normal.     Vessels  The aortic root is mildly dilated to 3.7 cm. The ascending aorta is mildly  dilated to 3.7 cm. The inferior vena cava was normal in size with preserved  respiratory variability. The pulmonary artery cannot be assessed.     Pericardium  No pericardial effusion is present.     Miscellaneous  No significant valvular abnormalities present.     Compared to Previous Study  Compared to the prior study on 1/11/2024, the LV function is now moderately  reduced.     Attestation  I have personally viewed the imaging and agree with the interpretation and  report as documented by the fellow, Cornelius Chowdhury, and/or edited by me.  ______________________________________________________________________________  MMode/2D Measurements & Calculations  IVSd: 1.2 cm  LVIDd: 4.5 cm  LVIDs: 4.0 cm  LVPWd: 0.92 cm  FS: 11.6 %  LV mass(C)d: 162.3  grams  LV mass(C)dI: 92.6 grams/m2  Ao root diam: 3.7 cm  asc Aorta Diam: 3.7 cm  LVOT diam: 2.4 cm  LVOT area: 4.6 cm2     EF(MOD-bp): 38.4 %  Ao root diam index Ht(cm/m): 2.2  Ao root diam index BSA (cm/m2): 2.1  Asc Ao diam index BSA (cm/m2): 2.1     Asc Ao diam index Ht(cm/m): 2.2  RWT: 0.41  TAPSE: 1.5 cm     Doppler Measurements & Calculations  MV E max chriss: 39.8 cm/sec  MV A max chriss: 67.3 cm/sec  MV E/A: 0.59  PA acc time: 0.09 sec  TR max chriss: 272.7 cm/sec  TR max P.8 mmHg  E/E' av.0  Lateral E/e': 6.8          cardiomyopathy       Conclusion    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   of the D1 with retrograde collaterals from the apical LAD.  Otherwise mild to moderate non obstructive CAD elsewhere.  No new angiographic lesions to explain the recent / new drop in EF.         Plan      Follow bedrest per protocol    Continued medical management and lifestyle modifications for cardiovascular risk factor optimizations.    Return to the primary inpatient team for further evaluation and managmenet     Coronary Findings    Diagnostic  Dominance: Right  Left Main   The vessel is moderate in size and is angiographically normal.      Left Anterior Descending   The vessel is moderate in size and is angiographically normal.   Ost LAD to Mid LAD lesion is 35% stenosed. The lesion was previously treated using a stent of unknown type.   Dist LAD lesion is 30% stenosed.      First Diagonal Branch   The vessel is moderate in size.   Collaterals   1st Diag filled by collaterals from Dist LAD.      1st Diag lesion is 100% stenosed.      Second Diagonal Branch   The vessel is moderate in size and is angiographically normal.      Left Circumflex   The vessel is moderate in size and is angiographically normal.   Mid Cx lesion is 40% stenosed.      First Obtuse Marginal Branch   The vessel is small.   1st Mrg lesion is 40%  stenosed.      Second Obtuse Marginal Branch   The vessel is moderate in size and is angiographically normal.   2nd Mrg lesion is 20% stenosed.      Third Obtuse Marginal Branch   The vessel is small and is angiographically normal.      Right Coronary Artery   The vessel is moderate in size.   Prox RCA lesion is 40% stenosed.   Prox RCA to Mid RCA lesion is 25% stenosed. The lesion was previously treated using a stent of unknown type.   Mid RCA to Dist RCA lesion is 25% stenosed.      Right Posterior Descending Artery   The vessel is moderate in size and is angiographically normal.      Right Posterior Atrioventricular Artery   The vessel is moderate in size and is angiographically normal.      First Right Posterolateral Branch   The vessel is small and is angiographically normal.      Second Right Posterolateral Branch   The vessel is small and is angiographically normal.         Intervention     No interventions have been documented.     Pressures Phase: Baseline       Time Systolic (mmHg) Diastolic (mmHg) Mean (mmHg) A Wave (mmHg) V Wave (mmHg) EDP (mmHg) Max dp/dt (mmHg/sec) HR (bpm) Content (mL/dL) SAT (%)   AO Pressures  4:31     65    92                   Please do not hesitate to contact me if you have any questions/concerns.     Sincerely,     Ronni Lopez MD

## 2024-12-04 NOTE — TELEPHONE ENCOUNTER
[facility] Call Center    Phone Message    May a detailed message be left on voicemail: yes     Reason for Call: Other: Pt wife would like a call back to discuss possibly changing this appt as the roads are bad where they live and she would like to know if this appt is needed at this time      Action Taken: Other: Cardio    Travel Screening: Not Applicable     Date of Service:

## 2024-12-04 NOTE — TELEPHONE ENCOUNTER
K+ 5.4 from 12/2  AST/ALT mildly elevated    Pt reports not taking much Tylenol recently     Reviewed with Dr. Bishop  -angie daily x2 doses. Recheck cmp on Friday along with tacrolimus level     Pt and wife understanding of plan

## 2024-12-04 NOTE — LETTER
PHYSICIAN ORDERS      DATE & TIME ISSUED: 2024 4:30 PM  PATIENT NAME: Aubrey Duncan   : 1955     [facility] MR# [if applicable]: [id number]     DIAGNOSIS:  Lung Transplant  Z94.2    Order to draw tacrolimus level and cmp on 24    Any questions please call: Devante [phone]    Please fax these results to [phone].      .

## 2024-12-05 ENCOUNTER — MEDICAL CORRESPONDENCE (OUTPATIENT)
Dept: HEALTH INFORMATION MANAGEMENT | Facility: OTHER | Age: 69
End: 2024-12-05
Payer: MEDICARE

## 2024-12-10 ENCOUNTER — TELEPHONE (OUTPATIENT)
Dept: FAMILY MEDICINE | Facility: OTHER | Age: 69
End: 2024-12-10
Payer: MEDICARE

## 2024-12-10 ENCOUNTER — MEDICAL CORRESPONDENCE (OUTPATIENT)
Dept: HEALTH INFORMATION MANAGEMENT | Facility: OTHER | Age: 69
End: 2024-12-10
Payer: MEDICARE

## 2024-12-10 DIAGNOSIS — Z94.2 S/P LUNG TRANSPLANT (H): Chronic | ICD-10-CM

## 2024-12-10 RX ORDER — TACROLIMUS 0.5 MG/1
CAPSULE ORAL
Status: SHIPPED
Start: 2024-12-10

## 2024-12-10 RX ORDER — TACROLIMUS 1 MG/1
2 CAPSULE ORAL 2 TIMES DAILY
Qty: 120 CAPSULE | Refills: 11 | Status: SHIPPED | OUTPATIENT
Start: 2024-12-10

## 2024-12-10 NOTE — TELEPHONE ENCOUNTER
Requesting to decrease sill nursing to 1x a week, Sarah states the wound is healing well  Please contact her at [phone]  She states she has a confidential voicemail so you may leave a detailed message    Jh Dee on 12/10/2024 at 2:59 PM

## 2024-12-10 NOTE — PROGRESS NOTES
Tacrolimus level 4.4 at 12 hours, on 12/6/24.  Goal 8-10.   Current dose 1.5 mg in AM, 2 mg in PM    Dose changed to 2 mg in AM, 2 mg in PM   Recheck level in 7-10 days    [employer] message sent

## 2024-12-11 ENCOUNTER — TRANSFERRED RECORDS (OUTPATIENT)
Dept: HEALTH INFORMATION MANAGEMENT | Facility: CLINIC | Age: 69
End: 2024-12-11
Payer: MEDICARE

## 2024-12-11 NOTE — TELEPHONE ENCOUNTER
They are requesting to change his skilled nursing to once a week instead of twice a week. His incision is scabbed over and they are just leaving it open to air. His family will let them know if anything changes with the incision.  Love Matos LPN..................12/11/2024   9:52 AM

## 2024-12-12 ENCOUNTER — TELEPHONE (OUTPATIENT)
Dept: TRANSPLANT | Facility: CLINIC | Age: 69
End: 2024-12-12
Payer: MEDICARE

## 2024-12-12 DIAGNOSIS — E87.5 HYPERKALEMIA: ICD-10-CM

## 2024-12-12 DIAGNOSIS — Z94.2 LUNG REPLACED BY TRANSPLANT (H): Primary | ICD-10-CM

## 2024-12-12 NOTE — TELEPHONE ENCOUNTER
Labs reviewed in care everywhere K+ 5.4 from 12/11    Pt has had x3 high potassium levels the past month     Reviewed with Dr. Joya:  -Lokelma BID x1 dose  -start Lokelma three times weekly starting Monday 12/16  -pt instructed to continue weekly labs at this point     Pt reports he ate mashed potatoes the night before his labs. Will try to watch diet closer     Wife understanding of plan     Patients wife states insurance won't pay for Lokelma.   Will try sending kayexalate to [facility] as local pharmacy is out.

## 2024-12-12 NOTE — TELEPHONE ENCOUNTER
Labs reviewed in care everywhere K+ 5.4 from 12/11    Pt has had x3 high potassium levels the past month     Reviewed with Dr. Joay:  -Lokelma BID x1 dose  -start Lokelma three times weekly starting Monday 12/16  -pt instructed to continue weekly labs at this point     Pt reports he ate mashed potatoes the night before his labs. Will try to watch diet closer     Wife understanding of plan

## 2024-12-17 ENCOUNTER — TELEPHONE (OUTPATIENT)
Dept: TRANSPLANT | Facility: CLINIC | Age: 69
End: 2024-12-17
Payer: MEDICARE

## 2024-12-18 DIAGNOSIS — Z94.2 S/P LUNG TRANSPLANT (H): Chronic | ICD-10-CM

## 2024-12-18 RX ORDER — TACROLIMUS 1 MG/1
CAPSULE ORAL
Qty: 150 CAPSULE | Refills: 11 | Status: SHIPPED | OUTPATIENT
Start: 2024-12-18

## 2024-12-18 RX ORDER — TACROLIMUS 0.5 MG/1
CAPSULE ORAL
Qty: 30 CAPSULE | Refills: 11 | Status: SHIPPED | OUTPATIENT
Start: 2024-12-18

## 2024-12-18 NOTE — LETTER
PHYSICIAN ORDERS      DATE & TIME ISSUED: 2024 1:56 PM  PATIENT NAME: Aubrey Duncan   : 1955     [facility] MR# [if applicable]: [id number]     DIAGNOSIS:  Lung Transplant  Z94.2    Order to check the following labs below:     24: basic metabolic panel     Week of 24: basic metabolic panel and tacrolimus level         Any questions please call: [place] [phone]    Please fax these results to [phone].         Alma Murphy MD  Pulmonary Disease

## 2024-12-18 NOTE — TELEPHONE ENCOUNTER
I called and spoke with Aubrey in lab. BMP was not drawn on 12/16. Requesting an new order faxed to their lab.  Is it just a BMP that is needed?

## 2024-12-18 NOTE — PROGRESS NOTES
Tacrolimus level 6.6 at 12 hours on 12/16  Goal 8-10  Dose increased to 2.5mg in AM and 3mg in PM    Pt did not get BMP drawn Monday.   Plan for bmp Friday after starting Lokelma three times weekly   Repeat tac level + bmp next week

## 2024-12-19 ENCOUNTER — VIRTUAL VISIT (OUTPATIENT)
Dept: TRANSPLANT | Facility: CLINIC | Age: 69
End: 2024-12-19
Attending: INTERNAL MEDICINE
Payer: MEDICARE

## 2024-12-19 DIAGNOSIS — Z79.52 LONG TERM (CURRENT) USE OF SYSTEMIC STEROIDS: ICD-10-CM

## 2024-12-19 DIAGNOSIS — Z94.2 LUNG REPLACED BY TRANSPLANT (H): ICD-10-CM

## 2024-12-19 RX ORDER — POTASSIUM CHLORIDE 1500 MG/1
1 TABLET, EXTENDED RELEASE ORAL DAILY
COMMUNITY
Start: 2024-11-25

## 2024-12-19 RX ORDER — PREDNISONE 2.5 MG/1
2.5 TABLET ORAL DAILY
COMMUNITY
Start: 2024-11-14

## 2024-12-19 ASSESSMENT — PAIN SCALES - GENERAL: PAINLEVEL_OUTOF10: NO PAIN (0)

## 2024-12-19 NOTE — PROGRESS NOTES
Transplant Coordinator Note    Reason for visit: Post lung transplant follow up visit   Coordinator: Present   Caregiver:      Health concerns addressed today:  1. Resp: No cough. No SOB, cough here and there- baseline.   2. GI: little diarrhea here and there- using Imodium   3. ENT   4. 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   5. 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 Increased lethargy and confusion 10/24, seen in the [facility] ER with concern for pneumonia and UTI s/p IV fluids and ceftriaxone, transferred to [facility] for sepsis, started on broad spectrum ABX, IV fluids, and stress dose steroids. The patient was transferred to [facility] on 10/26 for further work up of sepsis and encephalopathy, possible UTI as source     Activity/rehab:   Oxygen needs:   Pain management/RX:   Diabetic management:   Next Bronch due:   High risk donor:   Risk Criteria Labs:   CMV status:  Valcyte stopped:   EBV status:  DVT/PE:  Post op AFIB/follow up with EP:  AC/asa:   PJP prophylactic:     COVID:  COVID-19 infection (yes/no, date of most recent positive test):   Status/instructions given about COVID-19 vaccine:     Pt Education: medications (use/dose/side effects), how/when to call coordinator, frequency of labs, s/s of infection/rejection, call prior to starting any new medications, lab/vital sign book    Health Maintenance:   Last colonoscopy:   Next colonoscopy due:   Dermatology:  Vaccinations this visit:     Labs, CXR, PFTs reviewed with patient  Medication record reviewed and reconciled  Questions and concerns addressed    Patient Instructions  1. Continue to hydrate with 60-70 oz fluids daily.  2. Continue to exercise daily or most days of the week.  3. Follow up with your primary care provider for annual gender health maintenance procedures.  4. Follow up with colonoscopy  schedule.  5. Follow up with annual dermatology visits.  6. It doesn't seem like the COVID vaccine is working well in lung transplant patients. A number of lung transplant patients have gotten sick with COVID even after receiving the vaccines. Based on our recent experience, it can be life-threatening to get COVID  even after being vaccinated. Please continue to act like you did not get the COVID vaccine - social distancing, wearing a mask, good hand hygiene, etc. If the people around you are vaccinated, it will help reduce the risk of you getting COVID. All members of your household should be vaccinated.  7. You are due to see dermatology     Next transplant clinic appointment: 5 months with chest CT, fasting labs, DEXA scan, and FULL PFTs  Next lab draw:     AVS printed at time of check out

## 2024-12-19 NOTE — LETTER
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 He's otherwise been well except for COVID19 infection and recurrent sinus infections. He also has a recent PE and popliteal artery occlusion remaining on anticoagulation.     PULMONARY    Transplant:       Allograft Function: PFTs began having a decline in function between 5/2021-7/2021 with overall decline in FVC and FEV1 by about 500 mL today, quite stable from a year ago, consistent iwht about 78% of his post transplant baseline . Chest CT on 12/9/21 with small airways air trapping on expiration suggestive of CLAD. PFTs on 5/16 improved from baseline.   - No DSA detected " 5/16/23  - Azithromycin 250 three times a week, low due to QTc of 460.    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 Started on weekly ritux x 4 doses on 4/25/24. Following with Dr. Drake at [facility] oncology but also has local oncologist he can see. He is also seeing GI for evaluation of a possible pancreatic lesion.   - s/p 4 doses of Ritux from 4/25-5/15/24. 6/26/5 restaging PET with good partial response and slightly above liver uptake. No bowel thickening on 10/25/24 CT C/A/P. 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  - Chronic valcyte 450 mg bid (last in 2014)  - due to cost and no detectable CMV will stop with close initial monitoring    Recurrent sinus infections: He had recurrent sinus infections in the last year may have been related to deviated septum.   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 Follows with Hematology and vascular surgery. It is unclear whether they were provoked/unprovoked. HE has since had a bilateral BKA with non healing ulcers and critical limb ischemia.   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   - 2/2 CNI toxicity    Hx of squamous cell CA of the left forearm s/p Mohs 6/2016  Mohs 5/15/24  - Seen by Derm in [place] generally every 3-6 months, follows regularly, needs to have a new derm referral with timing down for here  - Mohs done 5/15/24     HTN  - Being seen by cardiology. 130-140s/70s except in the mornings  - 6.25 mg bid of carvedilol    Macrocytic Anemia: Mild, Hgb around 10-11.   - B12 intact, Folate a little low    Hyperlipidemia: Rosuvastatin 20 mg MWF, cut back due to LFT elevations.     Mild ALT, AST Elevation: In the past related to medications and then re-elevated in spring of 2022 but suspect this is related to gastroenteritis that he had ?hepatitis. No alcohol use or excessive tylenol use no herbal supplements. Up again on 12/6/24, will repeat in 4 weeks and then if still elevated plan for liver US, slightly increased uptake noted on PET scan from oncology.     Hypocalcemia: Continue Calcium and VitD Supplementation, instructions given.     Sinus Congestion: Saline washes     Depression: on 37.5 mg of venlafaxine started during hospitalization for BKAs andh elped his mood.     Maintenance:  Derm Exam: Saw derm in [place] Dr. Luz appointment due in January, but will try to see Dr. Luz here   DEXA: 11/2022 , estimated 10 year risk score for major osteoporotic fx is 12.7% and for hip is 3.8% which is decreased comparatively from 2020, no significant change in areas noted compared to prior study. Plan to do with annuals   Colonoscopy: 9/28/23- 2 tubular adenomas  Imms:   - Flu and COVIDx4, done for 2024 season  Shingrix- adverse reaction to this and Pneumovax and advised not to receive second dose      CHANGES TODAY    - No significant changes from transplant perspective     RTC: 6 months with derm visit, dexa, and annuals without 6MWT    I personally spent 35 minutes in documentation, the  interview, and review of the chart/labs/imaging on Dec 19, 2024 not including time spent interpreting spirometry.      The longitudinal plan of care for lung transplant and complications of high risk medication management was addressed during this visit. Due to the added complexity in care, I will continue to support this patient in the subsequent management of this condition(s) and with the ongoing continuity of care of this condition        Alma Murphy MD  [facility]   Pulmonary Transplant   Post Transplant Coordinator: Luz Cortes  Fax: [phone]  Ph: [phone]      Again, thank you for allowing me to participate in the care of your patient.        Sincerely,        Alma Murphy MD

## 2024-12-19 NOTE — PROGRESS NOTES
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 130-140s/70s except in the mornings  - 6.25 mg bid of carvedilol    Macrocytic Anemia: Mild, Hgb around 10-11.   - B12 intact, Folate a little low    Hyperlipidemia: Rosuvastatin 20 mg MWF, cut back due to LFT elevations.     Mild ALT, AST Elevation: In the past related to medications and then re-elevated in spring of 2022 but suspect this is related to gastroenteritis that he had ?hepatitis. No alcohol use or excessive tylenol use no herbal supplements. Up again on 12/6/24, will repeat in 4 weeks and then if still elevated plan for liver US, slightly increased uptake noted on PET scan from oncology.     Hypocalcemia: Continue Calcium and VitD Supplementation, instructions given.     Sinus Congestion: Saline washes     Depression: on 37.5 mg of venlafaxine started during hospitalization for BKAs andh elped his mood.     Maintenance:  Derm Exam:  Saw derm in [place] Dr. Luz appointment due in January, but will try to see Dr. Luz here   DEXA: 11/2022 , estimated 10 year risk score for major osteoporotic fx is 12.7% and for hip is 3.8% which is decreased comparatively from 2020, no significant change in areas noted compared to prior study. Plan to do with annuals   Colonoscopy: 9/28/23- 2 tubular adenomas  Imms:   - Flu and COVIDx4, done for 2024 season  Shingrix- adverse reaction to this and Pneumovax and advised not to receive second dose      CHANGES TODAY    - No significant changes from transplant perspective     RTC: 6 months with derm visit, dexa, and annuals without 6MWT    I personally spent 35 minutes in documentation, the interview, and review of the chart/labs/imaging on Dec 19, 2024 not including time spent interpreting spirometry.      The longitudinal plan of care for lung transplant and complications of high risk medication management was addressed during this visit. Due to the added complexity in care, I will continue to support this patient in the subsequent management of this condition(s) and with the ongoing continuity of care of this condition        Alma Murphy MD  [facility]  [facility]   Pulmonary Transplant   Post Transplant Coordinator: Luz Cortes  Fax: [phone]  Ph: [phone]

## 2024-12-20 ENCOUNTER — TELEPHONE (OUTPATIENT)
Dept: PULMONOLOGY | Facility: CLINIC | Age: 69
End: 2024-12-20
Payer: MEDICARE

## 2024-12-20 NOTE — TELEPHONE ENCOUNTER
Returned call to Kyung. All labs were drawn today. Will await results. Tac dose changed on 12/18 so will not be accurate level.     Will get weekly labs drawn next week including tacrolimus level.

## 2024-12-20 NOTE — TELEPHONE ENCOUNTER
[facility] Call Center    Phone Message    May a detailed message be left on voicemail: yes     Reason for Call: Other: Please have Devante contact Kyung per my chart request. She is home currently and available- [phone] Thank you       Action Taken: Other: Pulm    Travel Screening: Not Applicable     Date of Service:

## 2024-12-29 NOTE — LETTER
PHYSICIAN ORDERS      DATE & TIME ISSUED: 2024 7:14 PM  PATIENT NAME: Aubrey Duncan   : 1955     [facility] MR# [if applicable]: [id number]     DIAGNOSIS:  Lung Transplant  Z94.2  Order for RUQ abdominal ultrasound.       Any questions please call: Devante [phone]    Please fax these results to [phone].         Alma Murphy MD  Pulmonary Disease

## 2024-12-30 ENCOUNTER — TELEPHONE (OUTPATIENT)
Dept: TRANSPLANT | Facility: CLINIC | Age: 69
End: 2024-12-30
Payer: MEDICARE

## 2024-12-30 ENCOUNTER — MEDICAL CORRESPONDENCE (OUTPATIENT)
Dept: HEALTH INFORMATION MANAGEMENT | Facility: OTHER | Age: 69
End: 2024-12-30
Payer: MEDICARE

## 2024-12-30 DIAGNOSIS — Z94.2 S/P LUNG TRANSPLANT (H): Chronic | ICD-10-CM

## 2024-12-30 RX ORDER — TACROLIMUS 0.5 MG/1
CAPSULE ORAL
Qty: 30 CAPSULE | Refills: 11 | Status: SHIPPED | OUTPATIENT
Start: 2024-12-30

## 2024-12-30 RX ORDER — TACROLIMUS 1 MG/1
3 CAPSULE ORAL 2 TIMES DAILY
Qty: 180 CAPSULE | Refills: 11 | Status: SHIPPED | OUTPATIENT
Start: 2024-12-30

## 2024-12-30 NOTE — PROGRESS NOTES
Recent LFTs reviewed by Dr. Murphy  -pharmacy to review med list  -RUQ ultrasound     Orders faxed to [facility]

## 2024-12-30 NOTE — TELEPHONE ENCOUNTER
Tacrolimus level 7.2 at 12 hours, on 12/24/24.  Goal 8-10.   Current dose 3 mg in AM, 2.5 mg in PM    Dose changed to 3 mg in AM, 3 mg in PM   Recheck level early next week.    Discussed with pt's wife, Kyung.

## 2024-12-31 ENCOUNTER — CARE COORDINATION (OUTPATIENT)
Dept: GASTROENTEROLOGY | Facility: CLINIC | Age: 69
End: 2024-12-31
Payer: MEDICARE

## 2024-12-31 NOTE — PROGRESS NOTES
Patient requests to get imaging done locally:  [email or web address]  -[place]  [address], [place] [address]   [phone]  Fax: [phone]    Orders faxed

## 2025-01-02 ENCOUNTER — TELEPHONE (OUTPATIENT)
Dept: TRANSPLANT | Facility: CLINIC | Age: 70
End: 2025-01-02
Payer: MEDICARE

## 2025-01-02 NOTE — TELEPHONE ENCOUNTER
Provider Call: General  Route to LPN    Reason for call: Call from radiology- returning call to Armando Gavin's US is scheduled for 1/6/2025    Call back needed? No

## 2025-01-06 ENCOUNTER — MYC MEDICAL ADVICE (OUTPATIENT)
Dept: FAMILY MEDICINE | Facility: OTHER | Age: 70
End: 2025-01-06
Payer: MEDICARE

## 2025-01-06 ENCOUNTER — TELEPHONE (OUTPATIENT)
Dept: TRANSPLANT | Facility: CLINIC | Age: 70
End: 2025-01-06
Payer: MEDICARE

## 2025-01-06 NOTE — TELEPHONE ENCOUNTER
Provider Call: Transplant Lab/Orders  Route to LPN  Post Transplant Days: 4138  When patient is less than 60 days post-transplant, route high priority  Reason for Call: Annual lab reorder  Liver patients reporting abnormal lab results: Route to RN and Page  Document lab facility information when provider is calling about annual lab orders. Delete facility wildcards when not needed.  Facility Name: [facility]   Facility Location: [facility], [facility] Facility Fax Number: [phone]  Callback needed? No- Pt has appt for tomorrow for labs locally  before his Dr appt here  Needs orders faxed

## 2025-01-06 NOTE — LETTER
PHYSICIAN ORDERS    DATE & TIME ISSUED: 2025 10:28 AM  PATIENT NAME: Aubrey Duncan   : 1955     [facility] MR# [if applicable]: [id number]     DIAGNOSIS:  Long Term use of medications  Z79.899 and Lung Transplant  Z94.2    Please check a cbc with platelets and diff, cmp, mag, phosphorous, and   Tacrolimus (12 hour trough)       Any questions please call: Devante [phone]    Please fax these results to [phone].         Alma Murphy MD  Pulmonary Disease       Faxed to [facility] [phone]

## 2025-01-06 NOTE — TELEPHONE ENCOUNTER
Orders needed cbc w/plts and diff, cmp, mg, phosphorous, tac only for this week?  Would you like standing lab orders faxed to patient's local lab? How often?

## 2025-01-06 NOTE — LETTER
PHYSICIAN ORDERS    DATE & TIME ISSUED: 2025 2:46 PM  PATIENT NAME: Aubrey Duncan   : 1955     [facility] MR# [if applicable]: [id number]     DIAGNOSIS:  Long Term use of medications  Z79.899 and Lung Transplant  Z94.2                   Standing orders  2026   Item Frequency   X CBC with platelets and diff   Every 6 weeks and  PRN   X CMP (including: BUN,  Creatinine, Sodium, Potassium, Chloride, CO2, Calcium, Magnesium, Phosphorus, and Glucose) Every 6 weeks and PRN     X Tacrolimus/Prograf level  (Should be mailed to the [facility] in the  provided to you by the patient.) 12 hour trough level Every 6 weeks and PRN  Or 1 week after dose change   X CMV DNA by PCR Quant   Every 3 months and PRN   X EBV Quant PCR Plasma   Every 3 months and PRN          Any questions please call: Devante Escobar [phone]    Please fax these results to [phone].         Alma Murphy MD  Pulmonary Disease

## 2025-01-08 ENCOUNTER — TRANSFERRED RECORDS (OUTPATIENT)
Dept: HEALTH INFORMATION MANAGEMENT | Facility: CLINIC | Age: 70
End: 2025-01-08
Payer: MEDICARE

## 2025-01-08 ENCOUNTER — MEDICAL CORRESPONDENCE (OUTPATIENT)
Dept: HEALTH INFORMATION MANAGEMENT | Facility: OTHER | Age: 70
End: 2025-01-08
Payer: MEDICARE

## 2025-01-09 ENCOUNTER — DOCUMENTATION ONLY (OUTPATIENT)
Dept: GASTROENTEROLOGY | Facility: CLINIC | Age: 70
End: 2025-01-09
Payer: MEDICARE

## 2025-01-09 ENCOUNTER — TELEPHONE (OUTPATIENT)
Dept: GASTROENTEROLOGY | Facility: CLINIC | Age: 70
End: 2025-01-09
Payer: MEDICARE

## 2025-01-09 NOTE — LETTER
Patient:  Aubrey Duncan  :   1955  MRN:     [id number]        Mr.David MAURILIO Duncan  [address] [address]        2025    Dear ,    We are writing to inform you of your test results. In summary, reassuring news. Your MRI demonstrated no growth of the lesion under surveillance. As discussed previously, our plan includes close observation with repeat IV contrasted MRI/MRCP in another 6m. Without change, we will lengthen our interval. It continues to be a pleasure participating in your care.  Please feel free to contact our clinic with any further questions.      Sincerely,    Wu Cortez MD PhD MICHI TODD  Professor of Medicine, Surgery and Pediatrics  Interventional and Therapeutic Endoscopy  Chief, Division of Gastroenterology and Hepatology and Nutrition  GI Service     [facility] [address]  [address], [place] [address]    New Consultations  [phone]  Procedure Scheduling [phone]  Clinical Nurse Coordinator [phone]  Clinical Fax   [phone]  Administrative   [phone]  Administrative Fax  [phone]

## 2025-01-09 NOTE — TELEPHONE ENCOUNTER
[facility]    Phone Message    May a detailed message be left on voicemail: yes     Reason for Call: Requesting Results     Name/type of test: MRI  Date of test: 01/08  Was test done at a location other than [facility] (Please fill in the location if not [facility])?: No    Action Taken: Message routed to:  [facility] ([facility] Brian and Matti    Travel Screening: Not Applicable     Date of Service:

## 2025-01-14 DIAGNOSIS — K86.2 PANCREATIC CYST: Primary | ICD-10-CM

## 2025-01-15 DIAGNOSIS — Z94.2 S/P LUNG TRANSPLANT (H): Chronic | ICD-10-CM

## 2025-01-15 RX ORDER — TACROLIMUS 0.5 MG/1
0.5 CAPSULE ORAL EVERY EVENING
Qty: 30 CAPSULE | Refills: 11 | Status: SHIPPED | OUTPATIENT
Start: 2025-01-15

## 2025-01-15 RX ORDER — PREDNISONE 5 MG/1
TABLET ORAL
Qty: 45 TABLET | Refills: 11 | Status: SHIPPED | OUTPATIENT
Start: 2025-01-15

## 2025-01-15 RX ORDER — TACROLIMUS 1 MG/1
3 CAPSULE ORAL 2 TIMES DAILY
Qty: 180 CAPSULE | Refills: 11 | Status: SHIPPED | OUTPATIENT
Start: 2025-01-15

## 2025-01-20 NOTE — PROGRESS NOTES
[facility]  [facility] [place] [address]  Phone: [phone], Fax: [phone]    Outpatient Visit Note:    Patient: Aubrey Duncan  MRN: [id number]  : 1955  BELLO: 2025      History of Present Illness:  Aubrey Duncan is a 67 year old man with a history of alpha 1 antitrypsin deficiency, complicated by MILLY, s/p bilateral lung transplant 2013 (on azathioprine, tacrolimus, prednisone), heparin-induced thrombocytopenia (, complicated by right upper extremity PICC associated DVT, treated with fondaparinux), diabetes, hypertension, 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     He was seen by Dr. Sandy in .  At that time, after his transplant, he developed heparin-induced thrombocytopenia which was complicated by a right upper extremity DVT.  He had a PICC line in place at that time as well.  He was from known to be heterozygous for the prothrombin gene mutation at that time.  However he had not had previous episodes of thrombosis.     He does not appear to have had other episodes of VTE until mid ,  described below.    His significant peripheral arterial disease appears to have come to medical attention in 2022.  He developed heatstroke after working outside all day, as well as right lower extremity pain.  He was found to be hypotensive and received a significant amount of fluids.    To evaluate his peripheral arterial disease further, he underwent lower extremity arterial ultrasounds which showed extensive right lower extremity arterial occlusions, including the right profunda femoris, and anterior and posterior tibial arteries. He then underwent CT angiograms as part of a work-up for peripheral arterial disease, and was incidentally found to have a right lower lobe pulmonary embolism as well as segmental and subsegmental PEs of  the bilateral lower lobes.  Lower extremity ultrasounds showed DVTs in the right lower extremity, in particular in the tibial veins and both peroneal veins.  There was no DVT in the left lower extremity. He was started on anticoagulation at that time.    He has difficulty with ambulation at baseline due to his peripheral arterial disease as well as neuropathy.  He is tolerating Eliquis well, without bleeding or bruising issues.  He is overdue for colonoscopy.  No fevers, night sweats, weight loss, rashes, adenopathy.  He has had no blood clots other than the episode in 2013 and the most recent episode.  He denies long trips, surgery or immobilization prior to his hospital presentation for heatstroke in June.  Lower extremity ultrasound done during that presentation (which overall only lasted 1 day) was negative for DVT.    January 17, 2023: Completed 6 months of therapeutic apixaban.      May 9, 2023: Changed to 2.5 mg apixaban. Stopped this due to cost. On ASA and plavix. Some discoloration of toes noted, evaluated by vascular surgery, attributed to overly tight wrapping.  Notes no issues with bleeding or bruising.  No lower extremity edema or dyspnea.    August 22, 2023: CT chest and lower extremity ultrasounds without any evidence of VTEs.  Underwent EUS of pancreatic head mass without evidence of malignancy. Feeling well, no bleeding or bruising. Struggling with lower extremity claudicate of symptoms when walking, relieved by rest.    January 30, 2024: Continuing on dual antiplatelet therapy alone rather than anticoagulation.  Recently admitted for chest pain without any etiology found. Has received multiple cardiac stents in the interim.  Feeling okay currently, no bleeding or bruising issues.    January 21, 2025: He was admitted in August for right lower extremity wound.  He underwent a right below the knee amputation on August 28.  He was then admitted again in September with left foot pain and found to have  a new left acute distal popliteal artery occlusion and an anterior tibial artery occlusion, and he underwent balloon angioplasty on September 21 as well as stenting and tenecteplase.  He developed a pulmonary embolism during this admission.  Started on apixaban, aspirin continued, clopidogrel held.  He then underwent left BKA on September 25. CT scan on September 20 incidentally noted an acute segmental and subsegmental PE in the left lower lobe.  This  was found during an ER presentation later in the day after a lower extremity angiogram after which his leg pain got worse which is what prompted him to go to the ER.      Medications:  Current Outpatient Medications   Medication Sig Dispense Refill    acetaminophen (TYLENOL) 325 MG tablet Take 2 tablets (650 mg) by mouth every 6 hours as needed for mild pain 60 tablet 0    albuterol (PROAIR HFA/PROVENTIL HFA/VENTOLIN HFA) 108 (90 Base) MCG/ACT inhaler Inhale 1-2 puffs into the lungs every 6 hours as needed for shortness of breath or wheezing 8.5 g 3    apixaban ANTICOAGULANT (ELIQUIS) 5 MG tablet Take 1 tablet (5 mg) by mouth 2 times daily.      aspirin (ASA) 81 MG EC tablet Take 1 tablet (81 mg) by mouth daily 90 tablet 3    azithromycin (ZITHROMAX) 250 MG tablet Take 1 tablet (250 mg) by mouth Every Mon, Wed, Fri Morning. 36 tablet 3    blood glucose (NO BRAND SPECIFIED) test strip USE TO TEST BLOOD SUGAR 3 TIMES DAILY. DIAG CODE: E11.9 300 strip 3    Calcium Carbonate-Vitamin D 600-10 MG-MCG TABS Take 1 tablet by mouth 2 times daily (with meals) 60 tablet 11    carvedilol (COREG) 6.25 MG tablet Take 1 Tablet by mouth two times a day with meals. 120 tablet 0    dapsone (ACZONE) 25 MG tablet Take 2 tablets (50 mg) by mouth daily. 180 tablet 3    diclofenac (VOLTAREN) 1 % topical gel Apply 2 g topically 2 times daily as needed for moderate pain      Ferrous Sulfate Dried (HIGH POTENCY IRON) 65 MG TABS Take 1 tablet by mouth. Takes at noon      fluticasone-salmeterol  (ADVAIR-HFA) 230-21 MCG/ACT inhaler Inhale 2 puffs into the lungs 2 times daily 8 g 11    furosemide (LASIX) 20 MG tablet Take 1 tablet (20 mg) by mouth daily 90 tablet 4    HYDROmorphone (DILAUDID) 2 MG tablet Take 0.5 tablets (1 mg) by mouth every 6 hours as needed for severe pain. 15 tablet     insulin aspart (NOVOLOG FLEXPEN) 100 UNIT/ML pen Inject 3 Units subcutaneously daily (with breakfast). Do not give if blood sugar < 70 mg/dL.      insulin aspart (NOVOLOG PEN) 100 UNIT/ML pen Inject 3 Units subcutaneously 2 times daily (with meals). Lunch & supper      insulin glargine (LANTUS PEN) 100 UNIT/ML pen Inject 6 Units subcutaneously every morning (before breakfast).      insulin pen needle (32G X 4 MM) 32G X 4 MM miscellaneous Use 4 pen needles daily or as directed. Dispense as insurance allows. Dx. Code: E09.9 400 each 11    loperamide (IMODIUM) 2 MG capsule Take 1 capsule (2 mg) by mouth 4 times daily as needed for diarrhea 120 capsule 12    losartan (COZAAR) 50 MG tablet Take 1 tablet (50 mg) by mouth daily.      magnesium hydroxide (MILK OF MAGNESIA) 400 MG/5ML suspension Take 30 mLs by mouth daily as needed for constipation (Use if polyethylene glycol (Miralax) is not effective after 24 hours.). (Patient not taking: Reported on 12/19/2024)      magnesium oxide (MAG-OX) 400 MG tablet Take 2 tablets (800 mg) by mouth daily.      melatonin 1 MG TABS tablet Take 1 tablet (1 mg) by mouth nightly as needed for sleep. (Patient not taking: Reported on 12/19/2024)      metoclopramide (REGLAN) 5 MG tablet Take 1 tablet (5 mg) by mouth every 6 hours as needed (nausea) 30 tablet 0    Microlet Lancets MISC CHECK BLOOD SUGAR FOUR TIMES DAILY E11.9 400 each 1    montelukast (SINGULAIR) 10 MG tablet Take 1 tablet (10 mg) by mouth every evening. 90 tablet 3    multivitamin w/minerals (THERA-VIT-M) tablet Place 1 tablet into Feeding Tube daily (with lunch). (Patient not taking: Reported on 12/19/2024)      multivitamin,  therapeutic (THERA-VIT) TABS Take 1 tablet by mouth daily 30 tablet 12    order for DME Equipment being ordered: diabetic shoes 1 each 0    pantoprazole (PROTONIX) 40 MG EC tablet Take 1 tablet (40 mg) by mouth daily 90 tablet 1    potassium chloride rosemary ER (KLOR-CON M20) 20 MEQ CR tablet Take 1 tablet by mouth daily.      predniSONE (DELTASONE) 5 MG tablet Take 1 tablet (5 mg) by mouth every morning AND 0.5 tablets (2.5 mg) every evening. 45 tablet 11    rosuvastatin (CRESTOR) 40 MG tablet Take 20 mg by mouth Every Mon, Wed, Fri Morning      spironolactone (ALDACTONE) 25 MG tablet Take 0.5 tablets (12.5 mg) by mouth daily.      tacrolimus (GENERIC EQUIVALENT) 0.5 MG capsule Take 1 capsule (0.5 mg) by mouth every evening. Total dose: 3mg in AM and 3.5mg in PM 30 capsule 11    tacrolimus (GENERIC EQUIVALENT) 1 MG capsule Take 3 capsules (3 mg) by mouth 2 times daily. Total dose: 3mg in AM and 3.5mg in  capsule 11    thiamine (B-1) 100 MG tablet Take 1 tablet (100 mg) by mouth daily.      traMADol 25 MG TABS tablet Take 1 tablet (25 mg) by mouth every 4 hours as needed for moderate pain. (Patient not taking: Reported on 12/19/2024) 20 tablet 0    venlafaxine (EFFEXOR XR) 37.5 MG 24 hr capsule Take 37.5 mg by mouth daily.      wound support modular (EXPEDITE) LIQD bottle Take 60 mLs by mouth daily. (Patient not taking: Reported on 12/19/2024)        Assessment:  Aubrey Duncan is a 67 year old man s/p bilateral lung transplant, recurrent provoked VTEs, severe peripheral arterial disease s/p bilateral BKA's, on aspirin and apixaban.    His initial VTE's several years ago appear to be in the context of heatstroke.  The most recent VTE event occurred after an angioplasty of his lower extremities, at which time he was also admitted for worsening PAD and ultimately underwent one of his 2 BKA's.    It appears reasonable for him to continue on a combination of antiplatelet and anticoagulation therapy.  I would like to  verify with his vascular team that this would be their preference as well.  It is unclear if he has an ongoing relationship with a vascular provider, and I am working on clarifying this with him so that I know who I can discuss his case with.    Apixaban is an issue for him in terms of cost, so we discussed that alternatives would include warfarin (which he would prefer to avoid) or an injectable medication such as enoxaparin or fondaparinux.  I will await his decision about these.  If bleeding were to become an issue, he could reduce the dose of apixaban to 2.5 mg twice daily.    Plan:  -Continue ASA  -Continue apixaban, he will let me know if you would like to transition to an injectable agent to reduce cost  -Will attempt to discuss case with vascular to ensure this regimen is appropriate from their perspective  -Follow-up as needed    Jacob Cogan, MD  Hematology    40 minutes spent by me on the date of the encounter doing chart review, history and exam, documentation and further activities per the note    The longitudinal plan of care for the diagnosis(es)/condition(s) as documented were addressed during this visit. Due to the added complexity in care, I will continue to support Aubrey in the subsequent management and with ongoing continuity of care.    This note was completed in part using dictation via the Dragon voice recognition software. Some word and grammatical errors may occur and must be interpreted in the appropriate clinical context. If there are any questions pertaining to this issue, please contact me for further clarification.     Video-Visit Details:  Type of service:  Video Visit  Video Start Time: 10:40 AM  Video End Time (time video stopped): 10:50  Originating Location (pt. Location): Home  Distant Location (provider location):  [facility]   Mode of Communication:  Video Conference via [employer]

## 2025-01-21 ENCOUNTER — VIRTUAL VISIT (OUTPATIENT)
Dept: HEMATOLOGY | Facility: CLINIC | Age: 70
End: 2025-01-21
Attending: STUDENT IN AN ORGANIZED HEALTH CARE EDUCATION/TRAINING PROGRAM
Payer: MEDICARE

## 2025-01-21 ENCOUNTER — MYC MEDICAL ADVICE (OUTPATIENT)
Dept: HEMATOLOGY | Facility: CLINIC | Age: 70
End: 2025-01-21

## 2025-01-21 DIAGNOSIS — Z95.5 S/P CORONARY ARTERY STENT PLACEMENT: Primary | ICD-10-CM

## 2025-01-21 PROCEDURE — 98007 SYNCH AUDIO-VIDEO EST HI 40: CPT | Performed by: STUDENT IN AN ORGANIZED HEALTH CARE EDUCATION/TRAINING PROGRAM

## 2025-01-21 PROCEDURE — G2211 COMPLEX E/M VISIT ADD ON: HCPCS | Mod: 95 | Performed by: STUDENT IN AN ORGANIZED HEALTH CARE EDUCATION/TRAINING PROGRAM

## 2025-01-22 ENCOUNTER — CARE COORDINATION (OUTPATIENT)
Dept: GASTROENTEROLOGY | Facility: CLINIC | Age: 70
End: 2025-01-22
Payer: MEDICARE

## 2025-01-22 NOTE — PROGRESS NOTES
MRCP faxed:  [facility] RADIOLOGY MRI   [address] [place] [address]   [phone]   Fax: [phone]

## 2025-01-23 DIAGNOSIS — D75.9 DISEASE OF BLOOD AND BLOOD-FORMING ORGANS, UNSPECIFIED: ICD-10-CM

## 2025-01-23 DIAGNOSIS — I27.82 CHRONIC PULMONARY EMBOLISM, UNSPECIFIED PULMONARY EMBOLISM TYPE, UNSPECIFIED WHETHER ACUTE COR PULMONALE PRESENT (H): ICD-10-CM

## 2025-01-23 DIAGNOSIS — I74.3 POPLITEAL ARTERY THROMBOSIS, RIGHT (H): Primary | ICD-10-CM

## 2025-01-28 PROCEDURE — 80197 ASSAY OF TACROLIMUS: CPT | Performed by: INTERNAL MEDICINE

## 2025-02-01 ENCOUNTER — LAB (OUTPATIENT)
Dept: LAB | Facility: CLINIC | Age: 70
End: 2025-02-01
Payer: MEDICARE

## 2025-02-01 DIAGNOSIS — Z94.2 LUNG REPLACED BY TRANSPLANT (H): ICD-10-CM

## 2025-02-01 LAB
TACROLIMUS BLD-MCNC: 9.7 UG/L (ref 5–15)
TME LAST DOSE: NORMAL H
TME LAST DOSE: NORMAL H

## 2025-02-03 NOTE — RESULT ENCOUNTER NOTE
Tacrolimus level 9.7 at 12 hours, on 1/28/25.  Goal 8-10.   Current dose 3 mg in AM, 3.5 mg in PM    Level at goal.  No dose change.    [employer] message sent

## 2025-02-05 ENCOUNTER — TRANSFERRED RECORDS (OUTPATIENT)
Dept: HEALTH INFORMATION MANAGEMENT | Facility: CLINIC | Age: 70
End: 2025-02-05
Payer: MEDICARE

## 2025-02-20 ENCOUNTER — LAB (OUTPATIENT)
Dept: LAB | Facility: CLINIC | Age: 70
End: 2025-02-20
Payer: MEDICARE

## 2025-02-20 DIAGNOSIS — Z94.2 LUNG REPLACED BY TRANSPLANT (H): ICD-10-CM

## 2025-02-20 PROCEDURE — 80197 ASSAY OF TACROLIMUS: CPT

## 2025-02-25 DIAGNOSIS — Z94.2 LUNG REPLACED BY TRANSPLANT (H): Primary | ICD-10-CM

## 2025-02-25 LAB
TACROLIMUS BLD-MCNC: 11.5 UG/L (ref 5–15)
TME LAST DOSE: NORMAL H
TME LAST DOSE: NORMAL H

## 2025-02-26 ENCOUNTER — TELEPHONE (OUTPATIENT)
Dept: DERMATOLOGY | Facility: CLINIC | Age: 70
End: 2025-02-26

## 2025-02-26 RX ORDER — ROSUVASTATIN CALCIUM 40 MG/1
20 TABLET, COATED ORAL
Qty: 20 TABLET | Refills: 3 | Status: SHIPPED | OUTPATIENT
Start: 2025-02-26

## 2025-02-26 NOTE — TELEPHONE ENCOUNTER
Follow up call completed following Mohs procedure with Dr. Wilson.       Are you having pain? YES: mild  Are you taking pain medication? Tylenol (acetaminophen)  Are you applying ice?  NO  Have you had any noticeable bleeding through the bandage? NO   Do you have any other concerns? NO       Please call [phone] if you have any questions or concerns during business hours. For concerns after hours, call the hospital  to reach the dermatology resident on call at [phone], option 4.

## 2025-02-26 NOTE — PROGRESS NOTES
Reviewed pt's recent liver function testing.  Per deanne Salazar for pt to restart rosuvastatin 20 mg every Monday/Wednesday/Friday.  Will continue to trend.  [employer] message sent to pt.

## 2025-02-27 ENCOUNTER — MYC MEDICAL ADVICE (OUTPATIENT)
Dept: TRANSPLANT | Facility: CLINIC | Age: 70
End: 2025-02-27
Payer: MEDICARE

## 2025-02-27 DIAGNOSIS — Z94.2 S/P LUNG TRANSPLANT (H): Chronic | ICD-10-CM

## 2025-02-27 RX ORDER — TACROLIMUS 1 MG/1
3 CAPSULE ORAL 2 TIMES DAILY
Qty: 180 CAPSULE | Refills: 11 | Status: SHIPPED | OUTPATIENT
Start: 2025-02-27

## 2025-02-27 RX ORDER — TACROLIMUS 0.5 MG/1
0.5 CAPSULE ORAL EVERY EVENING
COMMUNITY
Start: 2025-02-27

## 2025-02-27 NOTE — TELEPHONE ENCOUNTER
Saulo Burgos,      Your tacrolimus level from 2/20 came back at 11.5     Goal 8-10     I have that you are currently taking 3mg in AM and 3.5mg in PM.      Can you decrease your dose to 3mg in AM and 3mg in PM?      Recheck tacrolimus level next week?      Pt confirmed he received message and is understanding of plan

## 2025-03-02 ENCOUNTER — HEALTH MAINTENANCE LETTER (OUTPATIENT)
Age: 70
End: 2025-03-02

## 2025-03-07 PROCEDURE — 80197 ASSAY OF TACROLIMUS: CPT | Performed by: INTERNAL MEDICINE

## 2025-03-10 DIAGNOSIS — I10 ESSENTIAL HYPERTENSION: Chronic | ICD-10-CM

## 2025-03-10 DIAGNOSIS — E11.9 DIABETES MELLITUS TYPE 2, DIET-CONTROLLED (H): ICD-10-CM

## 2025-03-10 DIAGNOSIS — Z94.2 LUNG REPLACED BY TRANSPLANT (H): ICD-10-CM

## 2025-03-10 DIAGNOSIS — K21.9 GASTROESOPHAGEAL REFLUX DISEASE, UNSPECIFIED WHETHER ESOPHAGITIS PRESENT: ICD-10-CM

## 2025-03-10 DIAGNOSIS — Z94.2 S/P LUNG TRANSPLANT (H): Chronic | ICD-10-CM

## 2025-03-10 DIAGNOSIS — T86.810 CHRONIC REJECTION OF ALLOGRAFT LUNG (H): ICD-10-CM

## 2025-03-10 RX ORDER — MONTELUKAST SODIUM 10 MG/1
10 TABLET ORAL EVERY EVENING
Qty: 90 TABLET | Refills: 3 | Status: SHIPPED | OUTPATIENT
Start: 2025-03-10

## 2025-03-10 RX ORDER — FLUTICASONE PROPIONATE AND SALMETEROL XINAFOATE 230; 21 UG/1; UG/1
2 AEROSOL, METERED RESPIRATORY (INHALATION) 2 TIMES DAILY
Qty: 8 G | Refills: 11 | Status: SHIPPED | OUTPATIENT
Start: 2025-03-10

## 2025-03-10 RX ORDER — FUROSEMIDE 20 MG/1
20 TABLET ORAL DAILY
Qty: 90 TABLET | Refills: 3 | Status: SHIPPED | OUTPATIENT
Start: 2025-03-10

## 2025-03-10 RX ORDER — PANTOPRAZOLE SODIUM 40 MG/1
40 TABLET, DELAYED RELEASE ORAL DAILY
Qty: 90 TABLET | Refills: 1 | Status: SHIPPED | OUTPATIENT
Start: 2025-03-10

## 2025-03-10 RX ORDER — PREDNISONE 5 MG/1
TABLET ORAL
Qty: 45 TABLET | Refills: 11 | Status: SHIPPED | OUTPATIENT
Start: 2025-03-10

## 2025-03-10 RX ORDER — CARVEDILOL 6.25 MG/1
6.25 TABLET ORAL 2 TIMES DAILY WITH MEALS
Qty: 180 TABLET | Refills: 3 | Status: SHIPPED | OUTPATIENT
Start: 2025-03-10

## 2025-03-10 RX ORDER — LANCETS
EACH MISCELLANEOUS
Qty: 400 EACH | Refills: 1 | Status: SHIPPED | OUTPATIENT
Start: 2025-03-10

## 2025-03-11 ENCOUNTER — TELEPHONE (OUTPATIENT)
Dept: PULMONOLOGY | Facility: CLINIC | Age: 70
End: 2025-03-11
Payer: MEDICARE

## 2025-03-11 NOTE — TELEPHONE ENCOUNTER
Hello,     This is [facility] requesting an addendum to the clinic notes from MD Alma Murphy on 12/19/2024 for this patient's Medicare order of the LANCETS.     The following addendum would be needed for Medicare compliance/coverage of blood glucose testing supplies:    Include that patient's diabetes diagnosis was discussed during visit (E11.9 per diagnosis code on prescription).  Visit notes to state that patient is testing blood glucose 4 times daily (per directions on the prescription) and a reason why patient is to test at this frequency.         As a reminder, Medicare requires patients to be seen every 6 months for high utilization of blood glucose testing supplies (4 times a day or more). The provider who sees the patient must be the provider on the prescription, must be written on the day of or after office visit date and office visit must include notes about diabetes and insulin regimen, or reason why patient is to test at specified frequency. The Diabetes Care Services team at [facility] may request new prescriptions before renewal dates of the prescription is filled over the allowable amount. Writing the order to match what is above will help ensure we will only need to request every 3 or 6 months.    If you have any questions regarding these requests please call us at [phone] or send a message to the [employer] pool     Thank you!     [facility]  Diabetes Care Services Team  [address] [place] [address]  Provider Phone: [phone]  Patient Line: [phone]  Fax: [phone]  E-mail: [email or web address]

## 2025-03-11 NOTE — PROGRESS NOTES
Pt restarted rosuvastatin 20 mg every Monday/Wednesday/Friday on 2/25.  Repeat LFTs on 3/7 reviewed by Dr. Murphy.  Plan for pt to remain on current dose.  Plan to trend LFTs every 2-3 weeks.  If staying in the same range, plan to monitor.

## 2025-03-12 ENCOUNTER — LAB (OUTPATIENT)
Dept: LAB | Facility: CLINIC | Age: 70
End: 2025-03-12
Payer: MEDICARE

## 2025-03-12 DIAGNOSIS — Z94.2 LUNG REPLACED BY TRANSPLANT (H): ICD-10-CM

## 2025-03-12 LAB
TACROLIMUS BLD-MCNC: 10.4 UG/L (ref 5–15)
TME LAST DOSE: NORMAL H
TME LAST DOSE: NORMAL H

## 2025-03-13 NOTE — TELEPHONE ENCOUNTER
Thank you so much!    This message is to notify you that the Diabetes Care Services team has completed their benefit check for this patient's LANCETS. We have notified the patient of their approval. If you have any questions, please let us know!    Thank you!    Diabetes Care Services Team  [facility]  [address] [place] [address]  Phone: [phone]  Fax; [phone]

## 2025-03-25 DIAGNOSIS — C83.398 DIFFUSE LARGE B-CELL LYMPHOMA OF SOLID ORGAN EXCLUDING SPLEEN (H): ICD-10-CM

## 2025-03-25 DIAGNOSIS — R11.0 NAUSEA: ICD-10-CM

## 2025-03-26 DIAGNOSIS — Z94.2 LUNG REPLACED BY TRANSPLANT (H): Primary | ICD-10-CM

## 2025-03-26 NOTE — PROGRESS NOTES
"Pt sends [employer] message stating \"Devante, I've been having trouble swallowing my food lately, I've had this problem before and had to have it ballooned out. It runs in our family. Would you know any Dr that's with Gastroenterology. I'd like to  have the procedure done at [facility] if possible.   Thanks   Dieudonne\"    GI referral placed.  "

## 2025-03-27 NOTE — TELEPHONE ENCOUNTER
REFERRAL INFORMATION:  Referring Provider:  Alma Murphy MD   Referring Clinic:   [facility]   Reason for Visit/Diagnosis: Z94.2 (ICD-10-CM) - Lung replaced by transplant (H)      FUTURE VISIT INFORMATION:  Appointment Date: 4/24/25     NOTES STATUS DETAILS   OFFICE NOTE from Referring Provider Internal 3/24/25-[facility] Med advice   OFFICE NOTE from Other Specialist Internal 3/26/25-Luz Cortes RN  10/29/24-Kacey Nolasco SLP   OPERATIVE REPORT Internal 12/7/15-Dysphagia     9/8/13-Bilateral Lung Transplant; On-Pump Oxygenator; Flexible Bronchoscopy    MEDICATION LIST Internal    PROCEDURES     ENDOSCOPY  Care Everywhere [facility] 3/22/24   COLONOSCOPY Internal 8/17/18   EUS Internal 6/5/24, 7/10/23   STOOL TESTING     LABS     PERTINENT LABS Internal    PATHOLOGY REPORTS (RELATED) Care Everywhere [facility] EGD 3/22/24   IMAGES     MRI In process [facility]:  2/5/25-MR abdomen MRCP    [facility]:  1/8/25-MR abdomen-more in PACS   CT Internal 10/25/24-CT chest/abdomen-more in PACS  8/30/24-CT chest   XRAY Internal 12/11/24-XR chest-more in PACS     Records Requested   March 27, 2025 9:46 AM  [id number]   Facility  [facility]   Outcome Requested image

## 2025-04-07 DIAGNOSIS — R74.8 ELEVATED LIVER ENZYMES: Primary | ICD-10-CM

## 2025-04-07 DIAGNOSIS — Z94.2 LUNG REPLACED BY TRANSPLANT (H): ICD-10-CM

## 2025-04-09 DIAGNOSIS — Z94.2 S/P LUNG TRANSPLANT (H): Primary | ICD-10-CM

## 2025-04-09 DIAGNOSIS — Z94.2 LUNG REPLACED BY TRANSPLANT (H): ICD-10-CM

## 2025-04-09 RX ORDER — DAPSONE 25 MG/1
50 TABLET ORAL DAILY
Qty: 180 TABLET | Refills: 3 | Status: SHIPPED | OUTPATIENT
Start: 2025-04-09

## 2025-04-09 RX ORDER — TACROLIMUS 1 MG/1
3 CAPSULE ORAL 2 TIMES DAILY
Qty: 180 CAPSULE | Refills: 11 | Status: SHIPPED | OUTPATIENT
Start: 2025-04-09

## 2025-04-18 ENCOUNTER — LAB (OUTPATIENT)
Dept: LAB | Facility: CLINIC | Age: 70
End: 2025-04-18
Payer: MEDICARE

## 2025-04-18 DIAGNOSIS — Z94.2 LUNG REPLACED BY TRANSPLANT (H): ICD-10-CM

## 2025-04-18 PROCEDURE — 80197 ASSAY OF TACROLIMUS: CPT

## 2025-04-24 ENCOUNTER — PRE VISIT (OUTPATIENT)
Dept: GASTROENTEROLOGY | Facility: CLINIC | Age: 70
End: 2025-04-24

## 2025-04-24 ENCOUNTER — VIRTUAL VISIT (OUTPATIENT)
Dept: GASTROENTEROLOGY | Facility: CLINIC | Age: 70
End: 2025-04-24
Attending: INTERNAL MEDICINE
Payer: MEDICARE

## 2025-04-24 DIAGNOSIS — R13.10 DYSPHAGIA, UNSPECIFIED TYPE: Primary | ICD-10-CM

## 2025-04-24 DIAGNOSIS — Z94.2 LUNG REPLACED BY TRANSPLANT (H): ICD-10-CM

## 2025-04-24 DIAGNOSIS — Z94.2 S/P LUNG TRANSPLANT (H): ICD-10-CM

## 2025-04-24 NOTE — PATIENT INSTRUCTIONS
It was a pleasure taking care of you today.  I've included a brief summary of our discussion and care plan from today's visit below.  Please review this information with your primary care provider.      We discussed the symptom of dysphagia merits evaluation first to rule out a structural lesion that may be causing obstruction and narrowing of the esophageal lumen and thus, causing the food to get stuck or progress slowly.  The lesions include Schatzki ring that usually cause intermittent solid food dysphagia (the  steakhouse syndrome ) and occasionally lead to visits to the ED for endoscopic disimpaction; eosinophilic esophagitis which is an allergic condition leading to inflammation and several rings; esophagitis due to acid reflux that eventually may lead to peptic stricture and of course cancer.  Other times the problem is in the oropharynx, for example in patients with neurologic conditions, pockets such as Zenker diverticulum or, cricopharyngeal hypertrophy.  When these structural lesions are ruled out we need to proceed with tests for the esophageal function (esophageal motility) which can show a weak peristalsis (ineffective), lack of peristalsis like in scleroderma or achalasia and or tight lower sphincter with failure to relax (like in achalasia). Depending of the tests, biopsies and dilation may be needed and if there is an esophageal motility condition, treatment can be tailored to the specific condition.      - Video fluoro swallow evaluation with speech  - Standard Esophagram with a Barium tablet  - Upper endoscopy with Jose Angel next available, AFTER completion of Video fluoro swallow evaluation with speech and Standard Esophagram with a Barium tablet  - RTC after testing ordered.       If you feel you received exceptional care and are interested in supporting the clinical and research goals of Dr. Bowles or the Division of Gastroenterology, Hepatology, and Nutrition please contact him directly through  [employer]  to discuss opportunities to donate.    Please call my nurse Pranay ([phone]) or Rauqel ([phone]) with any questions or concerns.     Sincerely,    Mario Walter MD      Please see below for any additional questions and scheduling guidelines.    Sign up for [employer]: [employer] patient portal serves as a secure platform for accessing your medical records from the [facility]. Additionally, [employer] facilitates easy, timely, and secure messaging with your care team. If you have not signed up, you may do so by using the provided code or calling [phone].    Coordinating your care after your visit:  There are multiple options for scheduling your follow-up care based on your provider's recommendation.    How do I schedule a follow-up clinic appointment:   After your appointment, you may receive scheduling assistance with the Clinic Coordinators by having a seat in the waiting room and a Clinic Coordinator will call you up to schedule.  Virtual visits or after you leave the clinic:  Your provider has placed a follow-up order in the [employer] portal for scheduling your return appointment. A member of the scheduling team will contact you to schedule.  Futuredermt Scheduling: Timely scheduling through [employer] is advised to ensure appointment availability.   Call to schedule: You may schedule your follow-up appointment(s) by calling [phone], option 1.    How do I schedule my endoscopy or colonoscopy procedure:  If a procedure, such as a colonoscopy or upper endoscopy was ordered by your provider, the scheduling team will contact you to schedule this procedure. Or you may choose to call to schedule at   [phone], option 2.  Please allow 20-30 minutes when scheduling a procedure.    How do I get my blood work done? To get your blood work done, you need to schedule a lab appointment at an [facility]. There are multiple ways to schedule:   At the clinic: The Clinic Coordinator you  meet after your visit can help you schedule a lab appointment.   [email or web address] scheduling: [employer] offers online lab scheduling at all [facility] laboratory locations.   Call to schedule: You can call [phone] to schedule your lab appointment.    How do I schedule my imaging study: To schedule imaging studies, such as CT scans, ultrasounds, MRIs, or X-rays, contact Imaging Services at [phone].    How do I schedule a referral to another doctor: If your provider recommended a referral to another specialist(s), the referral order was placed by your provider. You will receive a phone call to schedule this referral, or you may choose to call the number attached to the referral to self-schedule.    For Post-Visit Question(s):  For any inquiries following today's visit:  Please utilize [email or web address] messaging and allow 48 hours for reply or contact the Call Center during normal business hours at [phone], option 3.  For Emergent After-hours questions, contact the On-Call GI Fellow through the [facility]  at [phone].  In addition, you may contact your Nurse directly using the provided contact information.    Test Results:  Test results will be accessible via [email or web address] in compliance with the 21st Century Cures Act. This means that your results will be available to you at the same time as your provider. Often you may see your results before your provider does. Results are reviewed by staff within two weeks with communication follow-up. Results may be released in the patient portal prior to your care team review.    Prescription Refill(s):  Medication prescribed by your provider will be addressed during your visit. For future refills, please coordinate with your pharmacy. If you have not had a recent clinic visit or routine labs, for your safety, your provider may not be able to refill your prescription.

## 2025-04-24 NOTE — PROGRESS NOTES
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 of dysphagia merits evaluation first to rule out a structural lesion that may be causing obstruction and narrowing of the esophageal lumen and thus, causing the food to get stuck or progress slowly.  The lesions include Schatzki ring that usually cause intermittent solid food dysphagia (the  steakhouse syndrome ) and occasionally lead to visits to the ED for endoscopic disimpaction; eosinophilic esophagitis which is an allergic condition leading to inflammation and several rings; esophagitis due to acid reflux that eventually may lead to peptic stricture and of course cancer.  Other times the problem is in the oropharynx, for example in  patients with neurologic conditions, pockets such as Zenker diverticulum or, cricopharyngeal hypertrophy.  When these structural lesions are ruled out we need to proceed with tests for the esophageal function (esophageal motility) which can show a weak peristalsis (ineffective), lack of peristalsis like in scleroderma or achalasia and or tight lower sphincter with failure to relax (like in achalasia). Depending of the tests, biopsies and dilation may be needed and if there is an esophageal motility condition, treatment can be tailored to the specific condition.      - Video fluoro swallow evaluation with speech  - Standard Esophagram with a Barium tablet  - Upper endoscopy with Jose Angel next available, AFTER completion of Video fluoro swallow evaluation with speech and Standard Esophagram with a Barium tablet  - RTC after testing ordered.       Follow up plan:   Return to clinic after testing and as needed.    The risks and benefits of my recommendations, as well as other treatment options were discussed with the patient and any available family today. All questions were answered.     Follow up: As planned above. Today, I personally spent 40 minutes spent on the date of the encounter doing chart review, history and exam, documentation and further activities per the note.    The patient verbalized understanding of the plan and was appreciative for the time spent and information provided during the office visit.     Author:   Mario Walter MD    Director of Digital Health and [employer] ([employer])  Co-Director of Esophageal Disorders Program   of Medicine  Division of Gastroenterology, Hepatology and Nutrition    [facility] [id number] [place] [address]    Dr. Walter speaks for [employer] regarding vonoprazan. He receives income for these activities. When discussing the medication, patients were informed of   Jose Angel's role and potential conflict of interest. All questions and/or concerns were answered during the encounter.     Documentation assisted by voice recognition and documentation system.

## 2025-04-24 NOTE — LETTER
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 longstanding history of trouble with swallowing solids and soft foods at the level of neck and upper chest that happens with each meal that lasts several seconds and is alleviated with time and sips of liquids.     No choking gagging coughing or odynophagia  No regurgitation, self regurgitation, or nasal regurgitation or aspiration.   He has proper dentition  No history of radiation or foregut surgery other that transplant       He underwent an EGD in 2015 for dysphagia that was normal with unremarkable proximal and distal esophageal biopsies      We discussed the symptom of dysphagia merits evaluation first to rule out a structural lesion that may be causing obstruction and narrowing of the esophageal lumen and thus, causing the food to get stuck or progress slowly.  The lesions include  Schatzki ring that usually cause intermittent solid food dysphagia (the  steakhouse syndrome ) and occasionally lead to visits to the ED for endoscopic disimpaction; eosinophilic esophagitis which is an allergic condition leading to inflammation and several rings; esophagitis due to acid reflux that eventually may lead to peptic stricture and of course cancer.  Other times the problem is in the oropharynx, for example in patients with neurologic conditions, pockets such as Zenker diverticulum or, cricopharyngeal hypertrophy.  When these structural lesions are ruled out we need to proceed with tests for the esophageal function (esophageal motility) which can show a weak peristalsis (ineffective), lack of peristalsis like in scleroderma or achalasia and or tight lower sphincter with failure to relax (like in achalasia). Depending of the tests, biopsies and dilation may be needed and if there is an esophageal motility condition, treatment can be tailored to the specific condition.      - Video fluoro swallow evaluation with speech  - Standard Esophagram with a Barium tablet  - Upper endoscopy with Jose Angel next available, AFTER completion of Video fluoro swallow evaluation with speech and Standard Esophagram with a Barium tablet  - RTC after testing ordered.       Follow up plan:   Return to clinic after testing and as needed.    The risks and benefits of my recommendations, as well as other treatment options were discussed with the patient and any available family today. All questions were answered.     Follow up: As planned above. Today, I personally spent 40 minutes spent on the date of the encounter doing chart review, history and exam, documentation and further activities per the note.    The patient verbalized understanding of the plan and was appreciative for the time spent and information provided during the office visit.     Author:   Mario Walter MD    Director of Digital Health and [place]  Co-Director of  Esophageal Disorders Program   of Medicine  Division of Gastroenterology, Hepatology and Nutrition    [facility] [address] [place] [address]    Dr. Walter speaks for [employer] regarding vonoprazan. He receives income for these activities. When discussing the medication, patients were informed of Dr. Walter's role and potential conflict of interest. All questions and/or concerns were answered during the encounter.     Documentation assisted by voice recognition and documentation system.       Again, thank you for allowing me to participate in the care of your patient.        Sincerely,        Mario Walter MD    Electronically signed

## 2025-04-24 NOTE — NURSING NOTE
Current patient location: [address] [place] [address]    Is the patient currently in the state of [place]? YES    Visit mode: VIDEO    If the visit is dropped, the patient can be reconnected by:VIDEO VISIT: Text to cell phone:   Telephone Information:   Mobile [phone]       Will anyone else be joining the visit? NO  (If patient encounters technical issues they should call [phone])    Are changes needed to the allergy or medication list? No    Are refills needed on medications prescribed by this physician? NO    Rooming Documentation:  Questionnaire(s) completed    Reason for visit: Consult    Sommer DAVIS

## 2025-04-24 NOTE — PROGRESS NOTES
"Virtual Visit Details    Type of service:  Video Visit   Video Start Time: {video visit start/end time for provider to select:503557}  Video End Time:{video visit start/end time for provider to select:174793}    Originating Location (pt. Location): {video visit patient location:469184::\"Home\"}  {PROVIDER LOCATION On-site should be selected for visits conducted from your clinic location or adjoining [facility] hospital, academic office, or other nearby [facility] building. Off-site should be selected for all other provider locations, including home:819537}  Distant Location (provider location):  {virtual location provider:852933}  Platform used for Video Visit: {Virtual Visit Platforms:033803::\"Kandu\"}    "

## 2025-04-29 ENCOUNTER — TELEPHONE (OUTPATIENT)
Dept: GASTROENTEROLOGY | Facility: CLINIC | Age: 70
End: 2025-04-29
Payer: MEDICARE

## 2025-04-29 NOTE — TELEPHONE ENCOUNTER
"Endoscopy Scheduling Screen    Caller: spouse    Have you had any respiratory illness or flu-like symptoms in the last 10 days?  No    What is your communication preference for Instructions and/or Bowel Prep?   MyChart    What insurance is in the chart?  Other:  MEDICARE/[employer]    Ordering/Referring Provider: Mario Walter MD   (If ordering provider performs procedure, schedule with ordering provider unless otherwise instructed. )    BMI: Estimated body mass index is 19.58 kg/m  as calculated from the following:    Height as of 12/4/24: 1.702 m (5' 7\").    Weight as of 12/4/24: 56.7 kg (125 lb).     Sedation Ordered  MAC/deep sedation.   BMI<= 45 45 < BMI <= 48 48 < BMI < = 50  BMI > 50   No Restrictions No [facility]  No [facility]  [facility] with exceptions Hospital Only OR Only       Do you have a history of malignant hyperthermia?  No    (Females) Are you currently pregnant?   No     Have you been diagnosed or told you have pulmonary hypertension?   No    Do you have an LVAD?  No    Have you been told you have moderate to severe sleep apnea?  No.    Have you been told you have COPD, asthma, or any other lung disease?  Yes     What breathing problems do you have?  Alpha-1-antitrypsin deficiency (H)      Do you use home oxygen?  No    Have your breathing problems required an ED visit or hospitalization in the last year?  Yes. (RN Review required for scheduling unless scheduling in Hospital.)    Has your doctor ordered any cardiac tests like echo, angiogram, stress test, ablation, or EKG, that you have not completed yet?  No    Do you  have a history of any heart conditions?  Yes , stent placement    Have you had any hospitalizations  in the last year for heart related issues, for example a stent placement, heart attack, or cardiomyopathy?  No    Do you have any implantable devices in your body (pacemaker, ICD)?  Other Stent    Do you take nitroglycerine?  No    Have you ever had or are you waiting for an organ " "transplant?  Yes. Have you had or are on on the wait list for a heart/lung transplant? Yes, Hospital Only    Have you had a stroke or transient ischemic attack (TIA aka \"mini stroke\") in the last 2 years?   No.    Have you been diagnosed with or been told you have cirrhosis of the liver?   No.    Are you currently on dialysis?   No    Do you need assistance transferring?   Yes (Hospital Only)    BMI: Estimated body mass index is 19.58 kg/m  as calculated from the following:    Height as of 12/4/24: 1.702 m (5' 7\").    Weight as of 12/4/24: 56.7 kg (125 lb).     Is patients BMI > 40 and scheduling location [facility]?  No    Do you take an injectable or oral medication for weight loss or diabetes (excluding insulin)?  No    Do you take the medication Naltrexone?  No    Do you take blood thinners?  No       Prep   Are you currently on dialysis or do you have chronic kidney disease?  Yes ([place] Prep)    Do you have a diagnosis of diabetes?  Yes (G[place]ytely Prep)    Do you have a diagnosis of cystic fibrosis (CF)?  No    On a regular basis do you go 3 -5 days between bowel movements?  No    BMI > 40?  No    Preferred Pharmacy:    [facility] - [address] [place] [address] [address]  Phone: [phone] Fax: [phone]    Final Scheduling Details     Procedure scheduled  Upper endoscopy (EGD)    Surgeon:  Wilbur     Date of procedure:  8/26     Pre-OP / PAC:   No - Not required for this site.    Location  [facility] - Per exclusion criteria.    Sedation   MAC/Deep Sedation - Per order.      Patient Reminders:   You will receive a call from a Nurse to review instructions and health history.  This assessment must be completed prior to your procedure.  Failure to complete the Nurse assessment may result in the procedure being cancelled.      On the day of your procedure, please designate an adult(s) who can drive you home stay with you for the next 24 hours. The medicines used in the " exam will make you sleepy. You will not be able to drive.      You cannot take public transportation, ride share services, or non-medical taxi service without a responsible caregiver.  Medical transport services are allowed with the requirement that a responsible caregiver will receive you at your destination.  We require that drivers and caregivers are confirmed prior to your procedure.

## 2025-04-29 NOTE — TELEPHONE ENCOUNTER
Patient Contacted and scheduled the following:    Appointment type: Return  Provider:   Return date: 7/28  Specialty phone number: [phone]

## 2025-04-30 LAB
TACROLIMUS BLD-MCNC: 9.5 UG/L (ref 5–15)
TME LAST DOSE: NORMAL H
TME LAST DOSE: NORMAL H

## 2025-05-05 DIAGNOSIS — E11.42 TYPE 2 DIABETES MELLITUS WITH DIABETIC POLYNEUROPATHY, WITH LONG-TERM CURRENT USE OF INSULIN (H): ICD-10-CM

## 2025-05-05 DIAGNOSIS — Z79.4 TYPE 2 DIABETES MELLITUS WITH DIABETIC POLYNEUROPATHY, WITH LONG-TERM CURRENT USE OF INSULIN (H): ICD-10-CM

## 2025-05-05 NOTE — TELEPHONE ENCOUNTER
Has not seen provider in Endocrine  only podiatry    S/w pt  He has no sx  Son tested neg    Work sent him home for past 2d w/o pay  Advised I could order test through scheduled virtual visit and advised result may take 7d and he would have to stay home under PUI status    He is aware  He will speak with employer if test required

## 2025-05-06 DIAGNOSIS — Z94.2 S/P LUNG TRANSPLANT (H): ICD-10-CM

## 2025-05-06 RX ORDER — TACROLIMUS 0.5 MG/1
0.5 CAPSULE ORAL EVERY EVENING
Qty: 90 CAPSULE | Refills: 3 | Status: SHIPPED | OUTPATIENT
Start: 2025-05-06

## 2025-05-06 RX ORDER — TACROLIMUS 1 MG/1
CAPSULE ORAL
Qty: 150 CAPSULE | Refills: 11 | Status: SHIPPED | OUTPATIENT
Start: 2025-05-06

## 2025-05-06 RX ORDER — TACROLIMUS 1 MG/1
3 CAPSULE ORAL 2 TIMES DAILY
Qty: 180 CAPSULE | Refills: 11 | Status: SHIPPED | OUTPATIENT
Start: 2025-05-06 | End: 2025-05-06

## 2025-05-06 NOTE — LETTER
PHYSICIAN ORDERS      DATE & TIME ISSUED: May 6, 2025 10:59 AM  PATIENT NAME: Aubrey Duncan   : 1955     [facility] MR# [if applicable]: [id number]     DIAGNOSIS:  Lung Transplant  Z94.2    Week of 25:  Please draw BMP and tacrolimus level (note time of last dose)    Any questions please call: [place] [phone]    Please fax these results to [phone].         Alma Murphy MD  Pulmonary Disease

## 2025-05-06 NOTE — TELEPHONE ENCOUNTER
Medication/Refill approved per CPA:    [facility] AGREEMENT FOR  IMMUNOSUPPRESSENT PRESCRIPTION MODIFICATION.      Routing encounter to Transplant as an FYI.    Thanks,  Heladio Del Cid, Pharm D  [facility]

## 2025-05-06 NOTE — PROGRESS NOTES
Tacrolimus level 12.0 at 12 hours, on 5/2/25.  Goal 8-10.   Current dose 3 mg in AM, 3 mg in PM    Dose changed to 3 mg in AM, 2.5 mg in PM   Recheck level in 7-10 days     [employer] message sent

## 2025-05-12 PROCEDURE — 80197 ASSAY OF TACROLIMUS: CPT | Performed by: INTERNAL MEDICINE

## 2025-05-13 ENCOUNTER — RESULTS FOLLOW-UP (OUTPATIENT)
Dept: TRANSPLANT | Facility: CLINIC | Age: 70
End: 2025-05-13

## 2025-05-13 ENCOUNTER — TELEPHONE (OUTPATIENT)
Dept: TRANSPLANT | Facility: CLINIC | Age: 70
End: 2025-05-13
Payer: MEDICARE

## 2025-05-13 DIAGNOSIS — Z94.2 LUNG REPLACED BY TRANSPLANT (H): Primary | ICD-10-CM

## 2025-05-13 NOTE — PROGRESS NOTES
Impression  Lung transplant  Chronic immunosuppression  Steroid dependence  Recent sepsis  Recent bilateral BKA amputations  Reduced albumin  ASHD with stenting  Alpha one anti trypsin defect  Anemia with microcytosis  Intolerance to sulfa  Dapsone    Plan:  Repeat echocardiogram for EF as had sepsis at time which may result in transient decrease EF  Iron, iron binding, ferritin, soluble transfer receptor for microcytic anemia  Pre-albumin level  Hemoglobin A1C level  There is no interim history of increasing shortness of breath, orthopnea, PND, ankle edema, increasing abdominal girth, palpitation, pre-syncope, syncope, bleeding or thromboembolic complications.  The patient is taking medication regularly, following a low salt diet, and exercising regularly as outlined.    Alert, oriented, normal gait and station, normal mental, JVP within normal limits, HJR negative,thyroid not enlarged, mucous membranes clear, abdomen without tenderness, rebound or guarding, skin clear of lesion, PMI normal, S1 S2 regular rhythm, no gallop, no edema       Constitutional: +weight loss, butfever, chills, night sweats  HEENT: without visual changes, swallow difficulties  Pulmonary: history of lung transplant  Cardiac: without chest pain, RHODES, PND, orthopnea, edema, palpitation, pre-syncope, syncope,  GI: without diarrhea, constipation, jaundice, melena, GERD, hematemesis  : with frequency, urgency, dysuria, but no  hematuria  However recent uro-sepsis  Skin: rash, bruise, open lesions  Neuro: without TIA, focal neurologic complaints, seizure, trauma  Ortho: Bilateral BK amputatins  Endocrine: diabetes, thyroid, heat/cold intolerance, polyuria, polyphagia, change bowel habits.  Sleep:no JUSTINA, periodic breathing, fatigue         Latest Reference Range & Units 04/18/25 09:27 04/25/25 09:55 05/02/25 10:50 05/12/25 09:33   Sodium (External) 134 - 143 mEq/L 140 (C) (E) 139 (E) 141 138 (E)   Potassium (External) 3.4 - 5.1 mEq/L 5.6 (H)  (C) (E) 5.1 (E) 5.0 5.7 (H) (E)   Chloride (External) 99 - 110 mEq/L 108 (C) (E) 109 (E) 109 107 (E)   CO2 (External) 19 - 29 mEq/L 25 (C) (E) 21 (E) 21 25 (E)   Urea Nitrogen (External) 5 - 24 mg/dL 50 (H) (C) (E) 40 (H) (E) 44 (H) 34 (H) (E)   Creatinine (External) 0.70 - 1.20 mg/dL 1.19 (C) (E) 1.10 (E) 1.36 (H) 1.09 (E)   GFR Estimated (External) >60 ml/min/1.73m2 65 (C) (E) 72 (E) 56 (L) 73 (E)   Calcium (External) 8.4 - 10.5 mg/dL 8.9 (C) (E) 8.6 (E) 8.6 8.8 (E)   Anion Gap (External) 3.0 - 15.0 mEq/L 7.0 (C) (E) 9.0 (E) 11.0 6.0 (E)   Magnesium (External) 1.8 - 2.7 mg/dL 2.3 (C)  2.2    Albumin (External) 3.5 - 5.0 g/dL 3.4 (L) (C) (E)  3.4 (L)    Protein Total (External) 6.0 - 8.0 g/dL 6.2 (C) (E)  6.6    Alk Phosphatase (External) 40 - 150 IU/L 63 (C) (E)  79    ALT (External) 10 - 48 IU/L 79 (H) (C) (E)  74 (H)    AST (External) 16 - 40 IU/L 59 (H) (C) (E)  55 (H)    Bilirubin Direct (External) 0.0 - 0.5 mg/dL 0.3 (C) (E)      Bilirubin Total (External) 0.2 - 1.2 mg/dL 0.7 (C) (E)  0.7    Glucose (External) 70 - 99 mg/dL 72 (C) (E) 91 (E) 79 109 (H) (E)                  Name: YARED HAMLIN MAURILIO  MRN: [id number]  : 1955  Study Date: 10/28/2024 01:56 PM  Age: 69 yrs  Gender: Male  Patient [facility]: [facility]  Reason For Study: Dyspnea  Ordering Physician: ZACHARIAH MILLER  Referring Physician: KATE PHILIPPE  Performed By: Carmelita Walsh     BSA: 1.8 m2  Height: 67 in  Weight: 143 lb  HR: 105  BP: 159/97 mmHg  ______________________________________________________________________________  Procedure  Complete Portable Echo Adult. Contrast Optison. Optison (NDC #1244-3051-57)  given intravenously. Patient was given 6 ml mixture of 3 ml Optison and 6 ml  saline. 3 ml wasted. The patient's rhythm is atrial fibrillation.  ______________________________________________________________________________  Interpretation Summary  Left ventricular function is moderately reduced. Biplane EF is 38%.  Global right  ventricular function is normal.  No significant valvular abnormalities present.  The inferior vena cava was normal in size with preserved respiratory  variability.  Compared to the prior study on 1/11/2024, the LV function is now moderately  reduced.  ______________________________________________________________________________  Left Ventricle  Left ventricular size is normal. Left ventricular wall thickness is normal.  Thickening of the anterobasal septum is present. Left ventricular function is  decreased. The ejection fraction is 35-40% (moderately reduced). Grade I or  early diastolic dysfunction. Moderate diffuse hypokinesis is present. Regional  wall motion assessment limited by irregular rhythm.     Right Ventricle  The right ventricle is normal size. Global right ventricular function is  normal.     Atria  Both atria appear normal. The atrial septum is intact as assessed by color  Doppler .     Mitral Valve  Trace mitral insufficiency is present.     Aortic Valve  The aortic valve is tricuspid. No aortic regurgitation is present. No aortic  stenosis is present.     Tricuspid Valve  Trace tricuspid insufficiency is present. The right ventricular systolic  pressure is 30mmHg above the right atrial pressure. Pulmonary artery systolic  pressure is normal.     Pulmonic Valve  The pulmonic valve is normal.     Vessels  The aortic root is mildly dilated to 3.7 cm. The ascending aorta is mildly  dilated to 3.7 cm. The inferior vena cava was normal in size with preserved  respiratory variability. The pulmonary artery cannot be assessed.     Pericardium  No pericardial effusion is present.     Miscellaneous  No significant valvular abnormalities present.     Compared to Previous Study  Compared to the prior study on 1/11/2024, the LV function is now moderately  reduced.     Attestation  I have personally viewed the imaging and agree with the interpretation and  report as documented by the fellow, Cornelius Chowdhury,  and/or edited by me.  ______________________________________________________________________________  MMode/2D Measurements & Calculations  IVSd: 1.2 cm  LVIDd: 4.5 cm  LVIDs: 4.0 cm  LVPWd: 0.92 cm  FS: 11.6 %  LV mass(C)d: 162.3 grams  LV mass(C)dI: 92.6 grams/m2  Ao root diam: 3.7 cm  asc Aorta Diam: 3.7 cm  LVOT diam: 2.4 cm  LVOT area: 4.6 cm2     EF(MOD-bp): 38.4 %  Ao root diam index Ht(cm/m): 2.2  Ao root diam index BSA (cm/m2): 2.1  Asc Ao diam index BSA (cm/m2): 2.1     Asc Ao diam index Ht(cm/m): 2.2  RWT: 0.41  TAPSE: 1.5 cm     Doppler Measurements & Calculations  MV E max chriss: 39.8 cm/sec  MV A max chriss: 67.3 cm/sec  MV E/A: 0.59  PA acc time: 0.09 sec  TR max chriss: 272.7 cm/sec  TR max P.8 mmHg  E/E' av.0  Lateral E/e': 6.8          cardiomyopathy       Conclusion    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   of the D1 with retrograde collaterals from the apical LAD.  Otherwise mild to moderate non obstructive CAD elsewhere.  No new angiographic lesions to explain the recent / new drop in EF.         Plan     Follow bedrest per protocol   Continued medical management and lifestyle modifications for cardiovascular risk factor optimizations.   Return to the primary inpatient team for further evaluation and managmenet     Coronary Findings    Diagnostic  Dominance: Right  Left Main   The vessel is moderate in size and is angiographically normal.      Left Anterior Descending   The vessel is moderate in size and is angiographically normal.   Ost LAD to Mid LAD lesion is 35% stenosed. The lesion was previously treated using a stent of unknown type.   Dist LAD lesion is 30% stenosed.      First Diagonal Branch   The vessel is moderate in size.   Collaterals   1st Diag filled by collaterals from Dist LAD.      1st Diag lesion is 100% stenosed.      Second Diagonal Branch   The vessel is moderate in size and is  angiographically normal.      Left Circumflex   The vessel is moderate in size and is angiographically normal.   Mid Cx lesion is 40% stenosed.      First Obtuse Marginal Branch   The vessel is small.   1st Mrg lesion is 40% stenosed.      Second Obtuse Marginal Branch   The vessel is moderate in size and is angiographically normal.   2nd Mrg lesion is 20% stenosed.      Third Obtuse Marginal Branch   The vessel is small and is angiographically normal.      Right Coronary Artery   The vessel is moderate in size.   Prox RCA lesion is 40% stenosed.   Prox RCA to Mid RCA lesion is 25% stenosed. The lesion was previously treated using a stent of unknown type.   Mid RCA to Dist RCA lesion is 25% stenosed.      Right Posterior Descending Artery   The vessel is moderate in size and is angiographically normal.      Right Posterior Atrioventricular Artery   The vessel is moderate in size and is angiographically normal.      First Right Posterolateral Branch   The vessel is small and is angiographically normal.      Second Right Posterolateral Branch   The vessel is small and is angiographically normal.         Intervention     No interventions have been documented.     Pressures Phase: Baseline       Time Systolic (mmHg) Diastolic (mmHg) Mean (mmHg) A Wave (mmHg) V Wave (mmHg) EDP (mmHg) Max dp/dt (mmHg/sec) HR (bpm) Content (mL/dL) SAT (%)   AO Pressures  4:31     65    92

## 2025-05-13 NOTE — TELEPHONE ENCOUNTER
"K+ 5.7 on 5/12.  Pt has been eating \"a lot of baked potatoes on the grill lately.\"  Encouraged pt to maintain a low potassium diet.  Discussed with Dr. Bishop.  Plan for lokelma 10 g tonight.  Repeat labs tomorrow.  If K+ still elevated, will discuss with provider for further doses of lokelma.  "

## 2025-05-14 ENCOUNTER — LAB (OUTPATIENT)
Dept: LAB | Facility: CLINIC | Age: 70
End: 2025-05-14
Payer: MEDICARE

## 2025-05-14 ENCOUNTER — OFFICE VISIT (OUTPATIENT)
Dept: CARDIOLOGY | Facility: CLINIC | Age: 70
End: 2025-05-14
Attending: INTERNAL MEDICINE
Payer: MEDICARE

## 2025-05-14 ENCOUNTER — RESULTS FOLLOW-UP (OUTPATIENT)
Dept: CARDIOLOGY | Facility: CLINIC | Age: 70
End: 2025-05-14

## 2025-05-14 ENCOUNTER — ANCILLARY PROCEDURE (OUTPATIENT)
Dept: BONE DENSITY | Facility: CLINIC | Age: 70
End: 2025-05-14
Attending: INTERNAL MEDICINE
Payer: MEDICARE

## 2025-05-14 ENCOUNTER — ANCILLARY PROCEDURE (OUTPATIENT)
Dept: CT IMAGING | Facility: CLINIC | Age: 70
End: 2025-05-14
Attending: INTERNAL MEDICINE
Payer: MEDICARE

## 2025-05-14 ENCOUNTER — LAB (OUTPATIENT)
Dept: LAB | Facility: CLINIC | Age: 70
End: 2025-05-14
Attending: INTERNAL MEDICINE
Payer: MEDICARE

## 2025-05-14 VITALS
DIASTOLIC BLOOD PRESSURE: 80 MMHG | HEART RATE: 77 BPM | SYSTOLIC BLOOD PRESSURE: 152 MMHG | BODY MASS INDEX: 25.37 KG/M2 | OXYGEN SATURATION: 96 % | WEIGHT: 162 LBS

## 2025-05-14 DIAGNOSIS — R89.9 ABNORMAL LABORATORY TEST: ICD-10-CM

## 2025-05-14 DIAGNOSIS — R89.9 ABNORMAL LABORATORY TEST: Primary | ICD-10-CM

## 2025-05-14 DIAGNOSIS — Z94.2 LUNG REPLACED BY TRANSPLANT (H): ICD-10-CM

## 2025-05-14 DIAGNOSIS — I27.82 CHRONIC PULMONARY EMBOLISM, UNSPECIFIED PULMONARY EMBOLISM TYPE, UNSPECIFIED WHETHER ACUTE COR PULMONALE PRESENT (H): ICD-10-CM

## 2025-05-14 DIAGNOSIS — D75.9 DISEASE OF BLOOD AND BLOOD-FORMING ORGANS, UNSPECIFIED: ICD-10-CM

## 2025-05-14 DIAGNOSIS — Z79.52 LONG TERM (CURRENT) USE OF SYSTEMIC STEROIDS: ICD-10-CM

## 2025-05-14 DIAGNOSIS — I50.22 CHRONIC SYSTOLIC HEART FAILURE (H): ICD-10-CM

## 2025-05-14 DIAGNOSIS — I74.3 POPLITEAL ARTERY THROMBOSIS, RIGHT (H): ICD-10-CM

## 2025-05-14 LAB
ALBUMIN SERPL BCG-MCNC: 4 G/DL (ref 3.5–5.2)
ALP SERPL-CCNC: 68 U/L (ref 40–150)
ALT SERPL W P-5'-P-CCNC: 54 U/L (ref 0–70)
ANION GAP SERPL CALCULATED.3IONS-SCNC: 10 MMOL/L (ref 7–15)
AST SERPL W P-5'-P-CCNC: 38 U/L (ref 0–45)
BILIRUB SERPL-MCNC: 0.7 MG/DL
BUN SERPL-MCNC: 39.6 MG/DL (ref 8–23)
CALCIUM SERPL-MCNC: 9 MG/DL (ref 8.8–10.4)
CHLORIDE SERPL-SCNC: 108 MMOL/L (ref 98–107)
CREAT SERPL-MCNC: 1.15 MG/DL (ref 0.67–1.17)
EGFRCR SERPLBLD CKD-EPI 2021: 68 ML/MIN/1.73M2
ERYTHROCYTE [DISTWIDTH] IN BLOOD BY AUTOMATED COUNT: 15 % (ref 10–15)
FERRITIN SERPL-MCNC: 210 NG/ML (ref 31–409)
FOLATE SERPL-MCNC: 23.5 NG/ML (ref 4.6–34.8)
GLUCOSE SERPL-MCNC: 106 MG/DL (ref 70–99)
HCO3 SERPL-SCNC: 24 MMOL/L (ref 22–29)
HCT VFR BLD AUTO: 32 % (ref 40–53)
HGB BLD-MCNC: 9.8 G/DL (ref 13.3–17.7)
IRON BINDING CAPACITY (ROCHE): 291 UG/DL (ref 240–430)
IRON SATN MFR SERPL: 42 % (ref 15–46)
IRON SERPL-MCNC: 123 UG/DL (ref 61–157)
LDH SERPL L TO P-CCNC: 326 U/L (ref 0–250)
MCH RBC QN AUTO: 34.3 PG (ref 26.5–33)
MCHC RBC AUTO-ENTMCNC: 30.6 G/DL (ref 31.5–36.5)
MCV RBC AUTO: 112 FL (ref 78–100)
NT-PROBNP SERPL-MCNC: 1255 PG/ML (ref 0–229)
PLATELET # BLD AUTO: 158 10E3/UL (ref 150–450)
POTASSIUM SERPL-SCNC: 5.1 MMOL/L (ref 3.4–5.3)
PROT SERPL-MCNC: 6.4 G/DL (ref 6.4–8.3)
RBC # BLD AUTO: 2.86 10E6/UL (ref 4.4–5.9)
RETICS # AUTO: 0.17 10E6/UL (ref 0.03–0.1)
RETICS/RBC NFR AUTO: 6.1 % (ref 0.5–2)
SODIUM SERPL-SCNC: 142 MMOL/L (ref 135–145)
VIT B12 SERPL-MCNC: 770 PG/ML (ref 232–1245)
WBC # BLD AUTO: 7.3 10E3/UL (ref 4–11)

## 2025-05-14 PROCEDURE — G0452 MOLECULAR PATHOLOGY INTERPR: HCPCS | Mod: 26 | Performed by: PATHOLOGY

## 2025-05-14 PROCEDURE — 83615 LACTATE (LD) (LDH) ENZYME: CPT | Performed by: PATHOLOGY

## 2025-05-14 PROCEDURE — 1126F AMNT PAIN NOTED NONE PRSNT: CPT | Performed by: INTERNAL MEDICINE

## 2025-05-14 PROCEDURE — 82746 ASSAY OF FOLIC ACID SERUM: CPT | Performed by: INTERNAL MEDICINE

## 2025-05-14 PROCEDURE — 86146 BETA-2 GLYCOPROTEIN ANTIBODY: CPT | Performed by: STUDENT IN AN ORGANIZED HEALTH CARE EDUCATION/TRAINING PROGRAM

## 2025-05-14 PROCEDURE — 77080 DXA BONE DENSITY AXIAL: CPT | Performed by: INTERNAL MEDICINE

## 2025-05-14 PROCEDURE — 99000 SPECIMEN HANDLING OFFICE-LAB: CPT | Performed by: PATHOLOGY

## 2025-05-14 PROCEDURE — 3077F SYST BP >= 140 MM HG: CPT | Performed by: INTERNAL MEDICINE

## 2025-05-14 PROCEDURE — 83880 ASSAY OF NATRIURETIC PEPTIDE: CPT | Performed by: PATHOLOGY

## 2025-05-14 PROCEDURE — 86147 CARDIOLIPIN ANTIBODY EA IG: CPT | Performed by: STUDENT IN AN ORGANIZED HEALTH CARE EDUCATION/TRAINING PROGRAM

## 2025-05-14 PROCEDURE — 99214 OFFICE O/P EST MOD 30 MIN: CPT | Performed by: INTERNAL MEDICINE

## 2025-05-14 PROCEDURE — 85027 COMPLETE CBC AUTOMATED: CPT | Performed by: PATHOLOGY

## 2025-05-14 PROCEDURE — G0463 HOSPITAL OUTPT CLINIC VISIT: HCPCS | Performed by: INTERNAL MEDICINE

## 2025-05-14 PROCEDURE — 83540 ASSAY OF IRON: CPT | Performed by: PATHOLOGY

## 2025-05-14 PROCEDURE — 36415 COLL VENOUS BLD VENIPUNCTURE: CPT | Performed by: PATHOLOGY

## 2025-05-14 PROCEDURE — 82607 VITAMIN B-12: CPT | Performed by: INTERNAL MEDICINE

## 2025-05-14 PROCEDURE — 82668 ASSAY OF ERYTHROPOIETIN: CPT | Mod: 90 | Performed by: PATHOLOGY

## 2025-05-14 PROCEDURE — 80053 COMPREHEN METABOLIC PANEL: CPT | Performed by: PATHOLOGY

## 2025-05-14 PROCEDURE — 81270 JAK2 GENE: CPT | Performed by: STUDENT IN AN ORGANIZED HEALTH CARE EDUCATION/TRAINING PROGRAM

## 2025-05-14 PROCEDURE — 3079F DIAST BP 80-89 MM HG: CPT | Performed by: INTERNAL MEDICINE

## 2025-05-14 PROCEDURE — 84238 ASSAY NONENDOCRINE RECEPTOR: CPT | Mod: 90 | Performed by: PATHOLOGY

## 2025-05-14 PROCEDURE — 81339 MPL GENE SEQ ALYS EXON 10: CPT | Performed by: STUDENT IN AN ORGANIZED HEALTH CARE EDUCATION/TRAINING PROGRAM

## 2025-05-14 PROCEDURE — 85045 AUTOMATED RETICULOCYTE COUNT: CPT | Performed by: PATHOLOGY

## 2025-05-14 PROCEDURE — 82728 ASSAY OF FERRITIN: CPT | Performed by: PATHOLOGY

## 2025-05-14 PROCEDURE — 71250 CT THORAX DX C-: CPT | Mod: GC | Performed by: RADIOLOGY

## 2025-05-14 PROCEDURE — 83550 IRON BINDING TEST: CPT | Performed by: PATHOLOGY

## 2025-05-14 ASSESSMENT — PAIN SCALES - GENERAL: PAINLEVEL_OUTOF10: NO PAIN (0)

## 2025-05-14 NOTE — LETTER
5/14/2025      RE: Aubrey Duncan  [id number] [address]       Dear Colleague,    Thank you for the opportunity to participate in the care of your patient, Aubrey Duncan, at the [facility] [place] at [facility]. Please see a copy of my visit note below.        Impression  Lung transplant  Chronic immunosuppression  Steroid dependence  Recent sepsis  Recent bilateral BKA amputations  Reduced albumin  ASHD with stenting  Alpha one anti trypsin defect  Anemia with microcytosis  Intolerance to sulfa  Dapsone    Plan:  Repeat echocardiogram for EF as had sepsis at time which may result in transient decrease EF  Iron, iron binding, ferritin, soluble transfer receptor for microcytic anemia  Pre-albumin level  Hemoglobin A1C level  There is no interim history of increasing shortness of breath, orthopnea, PND, ankle edema, increasing abdominal girth, palpitation, pre-syncope, syncope, bleeding or thromboembolic complications.  The patient is taking medication regularly, following a low salt diet, and exercising regularly as outlined.    Alert, oriented, normal gait and station, normal mental, JVP within normal limits, HJR negative,thyroid not enlarged, mucous membranes clear, abdomen without tenderness, rebound or guarding, skin clear of lesion, PMI normal, S1 S2 regular rhythm, no gallop, no edema       Constitutional: +weight loss, butfever, chills, night sweats  HEENT: without visual changes, swallow difficulties  Pulmonary: history of lung transplant  Cardiac: without chest pain, RHODES, PND, orthopnea, edema, palpitation, pre-syncope, syncope,  GI: without diarrhea, constipation, jaundice, melena, GERD, hematemesis  : with frequency, urgency, dysuria, but no  hematuria  However recent uro-sepsis  Skin: rash, bruise, open lesions  Neuro: without TIA, focal neurologic complaints, seizure, trauma  Ortho: Bilateral BK amputatins  Endocrine:  diabetes, thyroid, heat/cold intolerance, polyuria, polyphagia, change bowel habits.  Sleep:no JUSTINA, periodic breathing, fatigue         Latest Reference Range & Units 25 09:27 25 09:55 25 10:50 25 09:33   Sodium (External) 134 - 143 mEq/L 140 (C) (E) 139 (E) 141 138 (E)   Potassium (External) 3.4 - 5.1 mEq/L 5.6 (H) (C) (E) 5.1 (E) 5.0 5.7 (H) (E)   Chloride (External) 99 - 110 mEq/L 108 (C) (E) 109 (E) 109 107 (E)   CO2 (External) 19 - 29 mEq/L 25 (C) (E) 21 (E) 21 25 (E)   Urea Nitrogen (External) 5 - 24 mg/dL 50 (H) (C) (E) 40 (H) (E) 44 (H) 34 (H) (E)   Creatinine (External) 0.70 - 1.20 mg/dL 1.19 (C) (E) 1.10 (E) 1.36 (H) 1.09 (E)   GFR Estimated (External) >60 ml/min/1.73m2 65 (C) (E) 72 (E) 56 (L) 73 (E)   Calcium (External) 8.4 - 10.5 mg/dL 8.9 (C) (E) 8.6 (E) 8.6 8.8 (E)   Anion Gap (External) 3.0 - 15.0 mEq/L 7.0 (C) (E) 9.0 (E) 11.0 6.0 (E)   Magnesium (External) 1.8 - 2.7 mg/dL 2.3 (C)  2.2    Albumin (External) 3.5 - 5.0 g/dL 3.4 (L) (C) (E)  3.4 (L)    Protein Total (External) 6.0 - 8.0 g/dL 6.2 (C) (E)  6.6    Alk Phosphatase (External) 40 - 150 IU/L 63 (C) (E)  79    ALT (External) 10 - 48 IU/L 79 (H) (C) (E)  74 (H)    AST (External) 16 - 40 IU/L 59 (H) (C) (E)  55 (H)    Bilirubin Direct (External) 0.0 - 0.5 mg/dL 0.3 (C) (E)      Bilirubin Total (External) 0.2 - 1.2 mg/dL 0.7 (C) (E)  0.7    Glucose (External) 70 - 99 mg/dL 72 (C) (E) 91 (E) 79 109 (H) (E)                  Name: YARED HAMLIN  MRN: [id number]  : 1955  Study Date: 10/28/2024 01:56 PM  Age: 69 yrs  Gender: Male  Patient Location: [facility]  Reason For Study: Dyspnea  Ordering Physician: ZACHARIAH MILLER  Referring Physician: KATE PHILIPPE  Performed By: Carmelita Walsh     BSA: 1.8 m2  Height: 67 in  Weight: 143 lb  HR: 105  BP: 159/97 mmHg  ______________________________________________________________________________  Procedure  Complete Portable Echo Adult. Contrast Optison. Optison (NDC  #7876-0974-45)  given intravenously. Patient was given 6 ml mixture of 3 ml Optison and 6 ml  saline. 3 ml wasted. The patient's rhythm is atrial fibrillation.  ______________________________________________________________________________  Interpretation Summary  Left ventricular function is moderately reduced. Biplane EF is 38%.  Global right ventricular function is normal.  No significant valvular abnormalities present.  The inferior vena cava was normal in size with preserved respiratory  variability.  Compared to the prior study on 1/11/2024, the LV function is now moderately  reduced.  ______________________________________________________________________________  Left Ventricle  Left ventricular size is normal. Left ventricular wall thickness is normal.  Thickening of the anterobasal septum is present. Left ventricular function is  decreased. The ejection fraction is 35-40% (moderately reduced). Grade I or  early diastolic dysfunction. Moderate diffuse hypokinesis is present. Regional  wall motion assessment limited by irregular rhythm.     Right Ventricle  The right ventricle is normal size. Global right ventricular function is  normal.     Atria  Both atria appear normal. The atrial septum is intact as assessed by color  Doppler .     Mitral Valve  Trace mitral insufficiency is present.     Aortic Valve  The aortic valve is tricuspid. No aortic regurgitation is present. No aortic  stenosis is present.     Tricuspid Valve  Trace tricuspid insufficiency is present. The right ventricular systolic  pressure is 30mmHg above the right atrial pressure. Pulmonary artery systolic  pressure is normal.     Pulmonic Valve  The pulmonic valve is normal.     Vessels  The aortic root is mildly dilated to 3.7 cm. The ascending aorta is mildly  dilated to 3.7 cm. The inferior vena cava was normal in size with preserved  respiratory variability. The pulmonary artery cannot be assessed.     Pericardium  No pericardial  effusion is present.     Miscellaneous  No significant valvular abnormalities present.     Compared to Previous Study  Compared to the prior study on 2024, the LV function is now moderately  reduced.     Attestation  I have personally viewed the imaging and agree with the interpretation and  report as documented by the fellow, Cornelius Chowdhury, and/or edited by me.  ______________________________________________________________________________  MMode/2D Measurements & Calculations  IVSd: 1.2 cm  LVIDd: 4.5 cm  LVIDs: 4.0 cm  LVPWd: 0.92 cm  FS: 11.6 %  LV mass(C)d: 162.3 grams  LV mass(C)dI: 92.6 grams/m2  Ao root diam: 3.7 cm  asc Aorta Diam: 3.7 cm  LVOT diam: 2.4 cm  LVOT area: 4.6 cm2     EF(MOD-bp): 38.4 %  Ao root diam index Ht(cm/m): 2.2  Ao root diam index BSA (cm/m2): 2.1  Asc Ao diam index BSA (cm/m2): 2.1     Asc Ao diam index Ht(cm/m): 2.2  RWT: 0.41  TAPSE: 1.5 cm     Doppler Measurements & Calculations  MV E max chriss: 39.8 cm/sec  MV A max chriss: 67.3 cm/sec  MV E/A: 0.59  PA acc time: 0.09 sec  TR max chriss: 272.7 cm/sec  TR max P.8 mmHg  E/E' av.0  Lateral E/e': 6.8          cardiomyopathy       Conclusion    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   of the D1 with retrograde collaterals from the apical LAD.  Otherwise mild to moderate non obstructive CAD elsewhere.  No new angiographic lesions to explain the recent / new drop in EF.         Plan      Follow bedrest per protocol    Continued medical management and lifestyle modifications for cardiovascular risk factor optimizations.    Return to the primary inpatient team for further evaluation and managmenet     Coronary Findings    Diagnostic  Dominance: Right  Left Main   The vessel is moderate in size and is angiographically normal.      Left Anterior Descending   The vessel is moderate in size and is angiographically normal.   Ost LAD to Mid LAD  lesion is 35% stenosed. The lesion was previously treated using a stent of unknown type.   Dist LAD lesion is 30% stenosed.      First Diagonal Branch   The vessel is moderate in size.   Collaterals   1st Diag filled by collaterals from Dist LAD.      1st Diag lesion is 100% stenosed.      Second Diagonal Branch   The vessel is moderate in size and is angiographically normal.      Left Circumflex   The vessel is moderate in size and is angiographically normal.   Mid Cx lesion is 40% stenosed.      First Obtuse Marginal Branch   The vessel is small.   1st Mrg lesion is 40% stenosed.      Second Obtuse Marginal Branch   The vessel is moderate in size and is angiographically normal.   2nd Mrg lesion is 20% stenosed.      Third Obtuse Marginal Branch   The vessel is small and is angiographically normal.      Right Coronary Artery   The vessel is moderate in size.   Prox RCA lesion is 40% stenosed.   Prox RCA to Mid RCA lesion is 25% stenosed. The lesion was previously treated using a stent of unknown type.   Mid RCA to Dist RCA lesion is 25% stenosed.      Right Posterior Descending Artery   The vessel is moderate in size and is angiographically normal.      Right Posterior Atrioventricular Artery   The vessel is moderate in size and is angiographically normal.      First Right Posterolateral Branch   The vessel is small and is angiographically normal.      Second Right Posterolateral Branch   The vessel is small and is angiographically normal.         Intervention     No interventions have been documented.     Pressures Phase: Baseline       Time Systolic (mmHg) Diastolic (mmHg) Mean (mmHg) A Wave (mmHg) V Wave (mmHg) EDP (mmHg) Max dp/dt (mmHg/sec) HR (bpm) Content (mL/dL) SAT (%)   AO Pressures  4:31     65    92                 Please do not hesitate to contact me if you have any questions/concerns.     Sincerely,     Ronni Lopez MD

## 2025-05-14 NOTE — NURSING NOTE
"Chief Complaint   Patient presents with    Follow Up      Reason for Visit: Return HF; 69yr old male with a h/o bilateral lung transplant, CAD and chronic systolic heart failure presenting for HF evaluation with labs prior.      BP Readings from Last 1 Encounters:   05/14/25 (!) 149/84      Pulse Readings from Last 1 Encounters:   05/14/25 77      Ht Readings from Last 2 Encounters:   12/04/24 1.702 m (5' 7\")   11/14/24 1.702 m (5' 7\")      Wt Readings from Last 2 Encounters:   05/14/25 73.5 kg (162 lb)   12/04/24 56.7 kg (125 lb)      Body mass index is 25.37 kg/m .    Vitals were taken, medications reconciled.     Peterson Walsh, Clinic Assistant    11:27 AM    "

## 2025-05-14 NOTE — PATIENT INSTRUCTIONS
"You were seen today in the Cardiovascular Clinic at the [facility].      Cardiology Providers you saw during your visit:  Dr. Ronni Lopez     Recommendations:   Dr. Lopez spoke with GI to accelerate swallow study, they will be reaching out to you  We are adding on labs today to the blood that was already drawn.   Please follow-up with Dr. Murphy to follow-up on anemia labs if abnormal        Eat a heart healthy, low sodium diet.  Get 20 to 30 minutes of aerobic exercise 4 to 5 times per week as tolerated. (Examples of aerobic exercise include: walking, bicycling, swimming, running).     Thank you for your visit today!   Please MyChart message or call if you have any questions or concerns.      During Business Hours:  [phone], option # 1 ([place])       After hours, weekends or holidays:   [phone], Option #4  Ask to speak to the On-Call Cardiologist. Inform them you are a heart failure patient at the [place].      Cyn Begum RN BSN CHFN  Cardiology Care Coordinator - [facility]  Questions and [phone]  First press #1 for the [employer] and then press #4 for \"Your Care Team\" to reach us Cardiology Nurses.                "

## 2025-05-15 ENCOUNTER — RESULTS FOLLOW-UP (OUTPATIENT)
Dept: TRANSPLANT | Facility: CLINIC | Age: 70
End: 2025-05-15

## 2025-05-15 LAB
DLCOCOR-%PRED-PRE: 81 %
DLCOCOR-PRE: 19.64 ML/MIN/MMHG
DLCOUNC-%PRED-PRE: 67 %
DLCOUNC-PRE: 16.34 ML/MIN/MMHG
DLCOUNC-PRED: 24.14 ML/MIN/MMHG
EPO SERPL-ACNC: 39 MU/ML
ERV-%PRED-PRE: 33 %
ERV-PRE: 0.44 L
ERV-PRED: 1.33 L
EXPTIME-PRE: 6.31 SEC
FEF2575-%PRED-PRE: 132 %
FEF2575-PRE: 2.83 L/SEC
FEF2575-PRED: 2.13 L/SEC
FEFMAX-%PRED-PRE: 126 %
FEFMAX-PRE: 9.79 L/SEC
FEFMAX-PRED: 7.72 L/SEC
FEV1-%PRED-PRE: 98 %
FEV1-PRE: 2.71 L
FEV1FEV6-PRE: 81 %
FEV1FEV6-PRED: 77 %
FEV1FVC-PRE: 80 %
FEV1FVC-PRED: 77 %
FEV1SVC-PRE: 77 %
FEV1SVC-PRED: 70 %
FIFMAX-PRE: 6.62 L/SEC
FRCPLETH-%PRED-PRE: 69 %
FRCPLETH-PRE: 2.42 L
FRCPLETH-PRED: 3.51 L
FVC-%PRED-PRE: 94 %
FVC-PRE: 3.38 L
FVC-PRED: 3.59 L
IC-%PRED-PRE: 115 %
IC-PRE: 3.06 L
IC-PRED: 2.66 L
RVPLETH-%PRED-PRE: 75 %
RVPLETH-PRE: 1.98 L
RVPLETH-PRED: 2.61 L
TLCPLETH-%PRED-PRE: 81 %
TLCPLETH-PRE: 5.48 L
TLCPLETH-PRED: 6.72 L
VA-%PRED-PRE: 76 %
VA-PRE: 4.59 L
VC-%PRED-PRE: 88 %
VC-PRE: 3.5 L
VC-PRED: 3.94 L

## 2025-05-17 ENCOUNTER — LAB (OUTPATIENT)
Dept: LAB | Facility: CLINIC | Age: 70
End: 2025-05-17
Payer: MEDICARE

## 2025-05-17 DIAGNOSIS — Z94.2 LUNG REPLACED BY TRANSPLANT (H): ICD-10-CM

## 2025-05-17 LAB
B2 GLYCOPROT1 IGG SERPL IA-ACNC: <0.8 U/ML
B2 GLYCOPROT1 IGM SERPL IA-ACNC: <2.4 U/ML
CARDIOLIPIN IGG SER IA-ACNC: <2 GPL-U/ML
CARDIOLIPIN IGG SER IA-ACNC: NEGATIVE
CARDIOLIPIN IGM SER IA-ACNC: <2 MPL-U/ML
CARDIOLIPIN IGM SER IA-ACNC: NEGATIVE

## 2025-05-18 LAB
TACROLIMUS BLD-MCNC: 9.7 UG/L (ref 5–15)
TME LAST DOSE: NORMAL H
TME LAST DOSE: NORMAL H

## 2025-05-19 ENCOUNTER — RESULTS FOLLOW-UP (OUTPATIENT)
Dept: TRANSPLANT | Facility: CLINIC | Age: 70
End: 2025-05-19

## 2025-05-19 ENCOUNTER — RESULTS FOLLOW-UP (OUTPATIENT)
Dept: ONCOLOGY | Facility: CLINIC | Age: 70
End: 2025-05-19

## 2025-05-19 ENCOUNTER — TELEPHONE (OUTPATIENT)
Dept: TRANSPLANT | Facility: CLINIC | Age: 70
End: 2025-05-19

## 2025-05-19 DIAGNOSIS — Z94.2 LUNG REPLACED BY TRANSPLANT (H): Primary | ICD-10-CM

## 2025-05-19 NOTE — RESULT ENCOUNTER NOTE
Tacrolimus level 9.7 at 12 hours, on 5/12/25.  Goal 8-10.   Current dose 3 mg in AM, 2.5 mg in PM    Level at goal.  No dose change.    [employer] message sent

## 2025-05-19 NOTE — TELEPHONE ENCOUNTER
Labs, CXR, dexa scan, and PFTs from 5/14 reviewed by Dr. Murphy.    Plan:  - endocrine referral for osteoporosis management  - anemia referral for abnormal anemia labs  - testing (including repeat CXR and PFTs) and tx clinic visit on 6/18    Discussed plan with pt and his wife.  Pt is following up with his PCP about lump in his left breast and if further imaging is required.

## 2025-05-19 NOTE — TELEPHONE ENCOUNTER
Outbound call made to pt to update him on appts scheduled on 6/18. Pt and wife do not believe PFT and CXR is needed as he just had those on 5/14. Could you please review and confirm that the testing is needed for 6/18 please? I let them know that I would send this msg to you and get back to them. Thanks!

## 2025-05-20 ENCOUNTER — PATIENT OUTREACH (OUTPATIENT)
Dept: CARE COORDINATION | Facility: CLINIC | Age: 70
End: 2025-05-20
Payer: MEDICARE

## 2025-05-20 LAB
INTERPRETATION SERPL IEP-IMP: NORMAL
LAB TEST RESULTS REPORTED IN RPTPERIOD: NORMAL
SEQUENCING METHOD PNL BLD/T: NORMAL
SPECIMEN TYPE: NORMAL

## 2025-05-21 DIAGNOSIS — I26.99 OTHER ACUTE PULMONARY EMBOLISM WITHOUT ACUTE COR PULMONALE (H): ICD-10-CM

## 2025-05-22 ENCOUNTER — PATIENT OUTREACH (OUTPATIENT)
Dept: CARE COORDINATION | Facility: CLINIC | Age: 70
End: 2025-05-22

## 2025-05-27 ENCOUNTER — TRANSFERRED RECORDS (OUTPATIENT)
Dept: FAMILY MEDICINE | Facility: OTHER | Age: 70
End: 2025-05-27
Payer: MEDICARE

## 2025-05-27 VITALS — DIASTOLIC BLOOD PRESSURE: 62 MMHG | SYSTOLIC BLOOD PRESSURE: 126 MMHG

## 2025-05-27 NOTE — PROGRESS NOTES
Patient Quality Outreach    Patient is due for the following:   Hypertension -  BP check    Action(s) Taken:   No follow up needed at this time.    Type of outreach:    Chart review performed, no outreach needed. Blood pressure taken at last office visit at [facility].    Questions for provider review:    None         Jenna Aguilar RN

## 2025-05-28 ENCOUNTER — TRANSCRIBE ORDERS (OUTPATIENT)
Dept: OTHER | Age: 70
End: 2025-05-28

## 2025-05-28 DIAGNOSIS — S46.211A: Primary | ICD-10-CM

## 2025-05-29 ENCOUNTER — VIRTUAL VISIT (OUTPATIENT)
Dept: ONCOLOGY | Facility: CLINIC | Age: 70
End: 2025-05-29
Attending: INTERNAL MEDICINE
Payer: MEDICARE

## 2025-05-29 ENCOUNTER — MEDICAL CORRESPONDENCE (OUTPATIENT)
Dept: HEALTH INFORMATION MANAGEMENT | Facility: CLINIC | Age: 70
End: 2025-05-29
Payer: MEDICARE

## 2025-05-29 DIAGNOSIS — Z94.2 S/P LUNG TRANSPLANT (H): Chronic | ICD-10-CM

## 2025-05-29 DIAGNOSIS — C83.398 DIFFUSE LARGE B-CELL LYMPHOMA OF SOLID ORGAN EXCLUDING SPLEEN (H): ICD-10-CM

## 2025-05-29 DIAGNOSIS — D47.Z1 PTLD (POST-TRANSPLANT LYMPHOPROLIFERATIVE DISORDER) (H): Primary | ICD-10-CM

## 2025-05-29 DIAGNOSIS — M25.511 RIGHT SHOULDER PAIN, UNSPECIFIED CHRONICITY: Primary | ICD-10-CM

## 2025-05-29 PROCEDURE — 1125F AMNT PAIN NOTED PAIN PRSNT: CPT | Mod: 95 | Performed by: INTERNAL MEDICINE

## 2025-05-29 PROCEDURE — 98005 SYNCH AUDIO-VIDEO EST LOW 20: CPT | Performed by: INTERNAL MEDICINE

## 2025-05-29 NOTE — LETTER
5/29/2025      Aubrey Duncan  [id number] [facility] [address]      Dear Colleague,    Thank you for referring your patient, Aubrey Duncan, to the [facility]. Please see a copy of my visit note below.    [facility]  FOLLOW-UP VISIT NOTE  May 29, 2025  Subjective  REASON FOR VISIT: Follow up PTLD    ONCOLOGIC SUMMARY:  Diagnosis:  Stage IE PTLD/DLBCL dx 3/2024 in setting of bilateral lung transplant in 2013. Presented with melena and acute blood loss anemia (Hgb down to 8.2 from baseline of 11-12). EGD revealed a 5 cm segment of ulcerated and friable mucosa in the second segment of the duodenum. There was no active bleeding and the area was too diffuse to be treated endoscopically, but the area was biopsied. Case was discussed with Cardiology who recommended to continue DAPT with aspirin and Plavix given recent cardiac stenting (2/16/24). Pathology showed diffuse large B-cell lymphoma, GCB-subtype, with Ki67 80-90%. JUNG DAKOTA negative. FISH negative for MYC and BCL6 rearrangements. Staging PET showed uptake in distal duodenum (SUVmax 23.8) and LLQ small bowel (SUVmax 14.4) only.     Intent of treatment: Curative    Treatment history:  4/25/24 to 5/15/24: weekly rituximab x 4 doses.   6/26/24 EOT PET showed ME. Further rituximab consolidation deferred d/t wound healing issues and need for amputation.   5/15/25 PET shows CR    INTERVAL HISTORY:  Aubrey is seen for video follow up today. Doing fairly well overall, slowly regaining strength and energy since his amputations. Did rupture his right biceps tendon last week while mowing the lawn. Still has some reflux and dysphagia (around throat level) for which he will be having VSS and EGD soon. Otherwise no fevers, night sweats, SOB, abd pain, diarrhea, or bleeding.     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 Tolerates Dilaudid well.      Fluocinolone Other (See Comments)     Tendon problems       Gabapentin Nausea and Vomiting     Levaquin Muscle Pain (Myalgia)     Pneumococcal Vaccine Swelling     Fever and \"My arm swelled up like a balloon.\"     Varicella Zoster Immune Globulin Swelling       Current Outpatient Medications:      acetaminophen (TYLENOL) 325 MG tablet, Take 2 tablets (650 mg) by mouth every 6 hours as needed for mild pain, Disp: 60 tablet, Rfl: 0     albuterol (PROAIR HFA/PROVENTIL HFA/VENTOLIN HFA) 108 (90 Base) MCG/ACT inhaler, Inhale 1-2 puffs into the lungs every 6 hours as needed for shortness of breath or wheezing, Disp: 8.5 g, Rfl: 3     apixaban ANTICOAGULANT (ELIQUIS) 5 MG tablet, Take 1 tablet (5 mg) by mouth 2 times daily., Disp: 60 tablet, Rfl: 0     aspirin (ASA) 81 MG EC tablet, Take 1 tablet (81 mg) by mouth daily, Disp: 90 tablet, Rfl: 3     azithromycin (ZITHROMAX) 250 MG tablet, Take 1 tablet (250 mg) by mouth Every Mon, Wed, Fri Morning., Disp: 36 tablet, Rfl: 3     blood glucose (NO BRAND SPECIFIED) test strip, USE TO TEST BLOOD SUGAR 3 TIMES DAILY. DIAG CODE: E11.9, Disp: 300 strip, Rfl: 3     Calcium Carbonate-Vitamin D 600-10 MG-MCG TABS, Take 1 tablet by mouth 2 times daily (with meals), Disp: 60 tablet, Rfl: 11     carvedilol (COREG) 6.25 MG tablet, Take 1 tablet (6.25 mg) by mouth 2 times daily (with meals)., Disp: 180 tablet, Rfl: 3     dapsone (ACZONE) 25 MG tablet, Take 2 tablets (50 " mg) by mouth daily., Disp: 180 tablet, Rfl: 3     diclofenac (VOLTAREN) 1 % topical gel, Apply 2 g topically 2 times daily as needed for moderate pain, Disp: , Rfl:      Ferrous Sulfate Dried (HIGH POTENCY IRON) 65 MG TABS, Take 1 tablet by mouth. Takes at noon, Disp: , Rfl:      fluticasone-salmeterol (ADVAIR-HFA) 230-21 MCG/ACT inhaler, Inhale 2 puffs into the lungs 2 times daily. (Patient not taking: Reported on 5/14/2025), Disp: 8 g, Rfl: 11     furosemide (LASIX) 20 MG tablet, Take 1 tablet (20 mg) by mouth daily., Disp: 90 tablet, Rfl: 3     HYDROmorphone (DILAUDID) 2 MG tablet, Take 0.5 tablets (1 mg) by mouth every 6 hours as needed for severe pain., Disp: 15 tablet, Rfl:      insulin aspart (NOVOLOG FLEXPEN) 100 UNIT/ML pen, Inject 3 Units subcutaneously daily (with breakfast). Do not give if blood sugar < 70 mg/dL., Disp: , Rfl:      insulin aspart (NOVOLOG PEN) 100 UNIT/ML pen, Inject 3 Units subcutaneously 2 times daily (with meals). Lunch & supper, Disp: , Rfl:      insulin glargine (LANTUS PEN) 100 UNIT/ML pen, Inject 6 Units subcutaneously every morning (before breakfast)., Disp: , Rfl:      insulin pen needle (32G X 4 MM) 32G X 4 MM miscellaneous, Use 4 pen needles daily or as directed. Dispense as insurance allows. Dx. Code: E09.9, Disp: 400 each, Rfl: 11     loperamide (IMODIUM) 2 MG capsule, Take 1 capsule (2 mg) by mouth 4 times daily as needed for diarrhea, Disp: 120 capsule, Rfl: 12     losartan (COZAAR) 50 MG tablet, Take 1 tablet (50 mg) by mouth daily., Disp: , Rfl:      magnesium hydroxide (MILK OF MAGNESIA) 400 MG/5ML suspension, Take 30 mLs by mouth daily as needed for constipation (Use if polyethylene glycol (Miralax) is not effective after 24 hours.). (Patient not taking: Reported on 12/19/2024), Disp: , Rfl:      magnesium oxide (MAG-OX) 400 MG tablet, Take 2 tablets (800 mg) by mouth daily., Disp: , Rfl:      melatonin 1 MG TABS tablet, Take 1 tablet (1 mg) by mouth nightly as needed  for sleep. (Patient not taking: Reported on 12/19/2024), Disp: , Rfl:      metoclopramide (REGLAN) 5 MG tablet, Take 1 tablet (5 mg) by mouth every 6 hours as needed (nausea), Disp: 30 tablet, Rfl: 0     Microlet Lancets MISC, CHECK BLOOD SUGAR FOUR TIMES DAILY E11.9, Disp: 400 each, Rfl: 1     montelukast (SINGULAIR) 10 MG tablet, Take 1 tablet (10 mg) by mouth every evening., Disp: 90 tablet, Rfl: 3     multivitamin w/minerals (THERA-VIT-M) tablet, Place 1 tablet into Feeding Tube daily (with lunch)., Disp: , Rfl:      multivitamin, therapeutic (THERA-VIT) TABS, Take 1 tablet by mouth daily, Disp: 30 tablet, Rfl: 12     order for DME, Equipment being ordered: diabetic shoes, Disp: 1 each, Rfl: 0     pantoprazole (PROTONIX) 40 MG EC tablet, Take 1 tablet (40 mg) by mouth daily., Disp: 90 tablet, Rfl: 1     potassium chloride rosemary ER (KLOR-CON M20) 20 MEQ CR tablet, Take 1 tablet by mouth daily., Disp: , Rfl:      predniSONE (DELTASONE) 5 MG tablet, Take 1 tablet (5 mg) by mouth every morning AND 0.5 tablets (2.5 mg) every evening., Disp: 45 tablet, Rfl: 11     spironolactone (ALDACTONE) 25 MG tablet, Take 0.5 tablets (12.5 mg) by mouth daily., Disp: , Rfl:      tacrolimus (GENERIC EQUIVALENT) 0.5 MG capsule, Take 1 capsule (0.5 mg) by mouth every evening. Total dose: 3mg in AM and 2.5mg in PM, Disp: 90 capsule, Rfl: 3     tacrolimus (GENERIC EQUIVALENT) 1 MG capsule, Take 3 capsules (3 mg) by mouth every morning AND 2 capsules (2 mg) every evening. Total dose: 3mg in AM and 2.5mg in PM., Disp: 150 capsule, Rfl: 11     thiamine (B-1) 100 MG tablet, Take 1 tablet (100 mg) by mouth daily., Disp: , Rfl:      traMADol 25 MG TABS tablet, Take 1 tablet (25 mg) by mouth every 4 hours as needed for moderate pain. (Patient not taking: Reported on 5/14/2025), Disp: 20 tablet, Rfl: 0     venlafaxine (EFFEXOR XR) 37.5 MG 24 hr capsule, Take 37.5 mg by mouth daily., Disp: , Rfl:      wound support modular (EXPEDITE) LIQD bottle,  Take 60 mLs by mouth daily. (Patient not taking: Reported on 5/14/2025), Disp: , Rfl:     REVIEW OF SYSTEMS:  10-point ROS reviewed and negative other than that mentioned in HPI.     Objective  ECOG PS: 1     PHYSICAL EXAM:  **Limited given video visit**  General: Patient appears well in no acute distress.   Skin: No rashes or lesions on visualized skin  Eyes: EOMI, no erythema, sclera icterus or discharge noted  Resp: Appears to be breathing comfortably without accessory muscle usage, speaking in full sentences, no cough  Neurologic: No apparent tremors, facial movements symmetric  Psych: Affect bright, alert and oriented     LABS:  I reviewed the following labs:  Lab Results   Component Value Date    WBC 7.3 05/14/2025    HGB 9.8 (L) 05/14/2025    HCT 32.0 (L) 05/14/2025     (H) 05/14/2025     05/14/2025    INR 1.26 (H) 11/01/2024    PTT 30 11/01/2024     Lab Results   Component Value Date     05/14/2025    POTASSIUM 5.1 05/14/2025    CR 1.15 05/14/2025    BUN 39.6 (H) 05/14/2025    CHLORIDE 108 (H) 05/14/2025    ERIN 9.0 05/14/2025     (H) 05/14/2025      Lab Results   Component Value Date    ALT 54 05/14/2025    AST 38 05/14/2025    ALKPHOS 68 05/14/2025    BILITOTAL 0.7 05/14/2025    BILIDIRECT 0.15 08/24/2015      Lab Results   Component Value Date     (H) 05/14/2025    URIC 5.9 04/16/2024      PET Oncology Whole Body (05/15/2025 11:48 AM)   IMPRESSION:  Resolution of previously FDG avid wall thickening in the distal duodenum, suggesting complete response to therapy.    Assessment & Plan  #Stage IE PTLD of duodenum, late-onset/EBV-negative  #Melena/acute blood anemia 2/2 above  Pleasant 68 year old male w/ hx of lung transplant in 2013 who presented with melena/acute blood anemia and was found to have a 5 cm segment of ulcerated/friable mucosa in the second portion of the duodenum on colonoscopy in 3/2024. Biopsy showed DLBCL, germinal center subtype, EBV negative. FISH  negative for MYC and BCL6 rearrangements. 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   - We previously reviewed the overall diagnosis and prognosis of DLBCL/PTLD. Given his other significant comorbidities, started with single-agent rituximab weekly x 4 doses. His immunosuppression has already been reduced with stopping of azathioprine, which is also a key part of treatment.   - Completed 4 doses of weekly rituximab from 4/25/24 to 5/15/24, tolerated well. 6/26/24 restaging PET showed good partial response with significantly decreased hypermetabolism in distal duodenum (SUVmax 23.8 -> 6.0). Still slightly above liver uptake. Was considering consolidative rituximab q2 months x 4 more doses but given issues with non-healing foot ulcers and upcoming vascular surgery, we decided to hold off and just monitor.   - 5/1525 PET now shows CR with resolution of previously FDG-avid wall thickening in distal duodenum! He will have EGD soon as well.   - Continue surveillance with visit and PET q6 months through 2 years, then will transition care back to transplant team.      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 Appreciate them stopping azathioprine already.  - Now just on tacrolimus and low-dose prednisone for immunosuppresion. Mgmt per Transplant team.   - Ppx: azithro dapsone.      #CAD s/p staged PCI Jan ([facility] to LAD)/Feb 2024 ([facility] to RCA)  #PAD and critical limb ischemia s/p bilateral BKA's (R 8/28/24, L 9/25/24)  #Recurrent DVT/PE, hx of HIT  Following with Cardiology, Vascular Medicine, and classical Heme. Was already on aspirin/Plavix for PAD when he developed unstable angina and was found to have multiple blockages, had staged PCI. Had non-healing ulcers and critical limb ischemia this summer and had to undergo bilateral below the knee  amputations. Course c/b recurrent PE.   - On aspirin and now back on Eliquis.     #GERD  #Dysphagia  Following with GI. Will be having VSS and EGD soon.      PLAN:  - RTC in 6 months with PET    Total of 25 minutes on patient visit, reviewing records, interpreting test results, placing orders, and documentation on the day of service.    Amber Drake MD  Attending Physician, [facility]     Virtual Visit Details    Type of service:  Video Visit   Video Start Time: 5:35  Video End Time:5:43    Originating Location (pt. Location): Home    Distant Location (provider location):  On-site  Platform used for Video Visit: Damion      Again, thank you for allowing me to participate in the care of your patient.        Sincerely,        Amber Drake MD    Electronically signed

## 2025-05-29 NOTE — PROGRESS NOTES
[facility] CENTER  FOLLOW-UP VISIT NOTE  May 29, 2025  Subjective   REASON FOR VISIT: Follow up PTLD    ONCOLOGIC SUMMARY:  Diagnosis:  Stage IE PTLD/DLBCL dx 3/2024 in setting of bilateral lung transplant in 2013. Presented with melena and acute blood loss anemia (Hgb down to 8.2 from baseline of 11-12). EGD revealed a 5 cm segment of ulcerated and friable mucosa in the second segment of the duodenum. There was no active bleeding and the area was too diffuse to be treated endoscopically, but the area was biopsied. Case was discussed with Cardiology who recommended to continue DAPT with aspirin and Plavix given recent cardiac stenting (2/16/24). Pathology showed diffuse large B-cell lymphoma, GCB-subtype, with Ki67 80-90%. JUNG DAKOTA negative. FISH negative for MYC and BCL6 rearrangements. Staging PET showed uptake in distal duodenum (SUVmax 23.8) and LLQ small bowel (SUVmax 14.4) only.     Intent of treatment: Curative    Treatment history:  4/25/24 to 5/15/24: weekly rituximab x 4 doses.   6/26/24 EOT PET showed MT. Further rituximab consolidation deferred d/t wound healing issues and need for amputation.   5/15/25 PET shows CR    INTERVAL HISTORY:  Aubrey is seen for video follow up today. Doing fairly well overall, slowly regaining strength and energy since his amputations. Did rupture his right biceps tendon last week while mowing the lawn. Still has some reflux and dysphagia (around throat level) for which he will be having VSS and EGD soon. Otherwise no fevers, night sweats, SOB, abd pain, diarrhea, or bleeding.     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 Tolerates Dilaudid well.     Fluocinolone Other (See Comments)     Tendon problems      Gabapentin Nausea and Vomiting    Levaquin Muscle Pain (Myalgia)    Pneumococcal Vaccine Swelling     Fever and \"My arm swelled up like a balloon.\"    Varicella Zoster Immune Globulin Swelling       Current Outpatient Medications:     acetaminophen (TYLENOL) 325 MG tablet, Take 2 tablets (650 mg) by mouth every 6 hours as needed for mild pain, Disp: 60 tablet, Rfl: 0    albuterol (PROAIR HFA/PROVENTIL HFA/VENTOLIN HFA) 108 (90 Base) MCG/ACT inhaler, Inhale 1-2 puffs into the lungs every 6 hours as needed for shortness of breath or wheezing, Disp: 8.5 g, Rfl: 3    apixaban ANTICOAGULANT (ELIQUIS) 5 MG tablet, Take 1 tablet (5 mg) by mouth 2 times daily., Disp: 60 tablet, Rfl: 0    aspirin (ASA) 81 MG EC tablet, Take 1 tablet (81 mg) by mouth daily, Disp: 90 tablet, Rfl: 3    azithromycin (ZITHROMAX) 250 MG tablet, Take 1 tablet (250 mg) by mouth Every Mon, Wed, Fri Morning., Disp: 36 tablet, Rfl: 3    blood glucose (NO BRAND SPECIFIED) test strip, USE TO TEST BLOOD SUGAR 3 TIMES DAILY. DIAG CODE: E11.9, Disp: 300 strip, Rfl: 3    Calcium Carbonate-Vitamin D 600-10 MG-MCG TABS, Take 1 tablet by mouth 2 times daily (with meals), Disp: 60 tablet, Rfl: 11    carvedilol (COREG) 6.25 MG tablet, Take 1 tablet (6.25 mg) by mouth 2 times daily (with meals)., Disp: 180 tablet, Rfl: 3    dapsone (ACZONE) 25 MG tablet, Take 2 tablets (50 mg) by mouth daily., Disp: 180 tablet, Rfl: 3    diclofenac (VOLTAREN) 1 % topical gel, Apply 2 g topically 2 times daily as needed for moderate pain, Disp: , Rfl:     Ferrous Sulfate Dried (HIGH POTENCY IRON) 65 MG TABS, Take 1 tablet by mouth. Takes at noon, " Disp: , Rfl:     fluticasone-salmeterol (ADVAIR-HFA) 230-21 MCG/ACT inhaler, Inhale 2 puffs into the lungs 2 times daily. (Patient not taking: Reported on 5/14/2025), Disp: 8 g, Rfl: 11    furosemide (LASIX) 20 MG tablet, Take 1 tablet (20 mg) by mouth daily., Disp: 90 tablet, Rfl: 3    HYDROmorphone (DILAUDID) 2 MG tablet, Take 0.5 tablets (1 mg) by mouth every 6 hours as needed for severe pain., Disp: 15 tablet, Rfl:     insulin aspart (NOVOLOG FLEXPEN) 100 UNIT/ML pen, Inject 3 Units subcutaneously daily (with breakfast). Do not give if blood sugar < 70 mg/dL., Disp: , Rfl:     insulin aspart (NOVOLOG PEN) 100 UNIT/ML pen, Inject 3 Units subcutaneously 2 times daily (with meals). Lunch & supper, Disp: , Rfl:     insulin glargine (LANTUS PEN) 100 UNIT/ML pen, Inject 6 Units subcutaneously every morning (before breakfast)., Disp: , Rfl:     insulin pen needle (32G X 4 MM) 32G X 4 MM miscellaneous, Use 4 pen needles daily or as directed. Dispense as insurance allows. Dx. Code: E09.9, Disp: 400 each, Rfl: 11    loperamide (IMODIUM) 2 MG capsule, Take 1 capsule (2 mg) by mouth 4 times daily as needed for diarrhea, Disp: 120 capsule, Rfl: 12    losartan (COZAAR) 50 MG tablet, Take 1 tablet (50 mg) by mouth daily., Disp: , Rfl:     magnesium hydroxide (MILK OF MAGNESIA) 400 MG/5ML suspension, Take 30 mLs by mouth daily as needed for constipation (Use if polyethylene glycol (Miralax) is not effective after 24 hours.). (Patient not taking: Reported on 12/19/2024), Disp: , Rfl:     magnesium oxide (MAG-OX) 400 MG tablet, Take 2 tablets (800 mg) by mouth daily., Disp: , Rfl:     melatonin 1 MG TABS tablet, Take 1 tablet (1 mg) by mouth nightly as needed for sleep. (Patient not taking: Reported on 12/19/2024), Disp: , Rfl:     metoclopramide (REGLAN) 5 MG tablet, Take 1 tablet (5 mg) by mouth every 6 hours as needed (nausea), Disp: 30 tablet, Rfl: 0    Microlet Lancets MISC, CHECK BLOOD SUGAR FOUR TIMES DAILY E11.9, Disp:  400 each, Rfl: 1    montelukast (SINGULAIR) 10 MG tablet, Take 1 tablet (10 mg) by mouth every evening., Disp: 90 tablet, Rfl: 3    multivitamin w/minerals (THERA-VIT-M) tablet, Place 1 tablet into Feeding Tube daily (with lunch)., Disp: , Rfl:     multivitamin, therapeutic (THERA-VIT) TABS, Take 1 tablet by mouth daily, Disp: 30 tablet, Rfl: 12    order for DME, Equipment being ordered: diabetic shoes, Disp: 1 each, Rfl: 0    pantoprazole (PROTONIX) 40 MG EC tablet, Take 1 tablet (40 mg) by mouth daily., Disp: 90 tablet, Rfl: 1    potassium chloride rosemary ER (KLOR-CON M20) 20 MEQ CR tablet, Take 1 tablet by mouth daily., Disp: , Rfl:     predniSONE (DELTASONE) 5 MG tablet, Take 1 tablet (5 mg) by mouth every morning AND 0.5 tablets (2.5 mg) every evening., Disp: 45 tablet, Rfl: 11    spironolactone (ALDACTONE) 25 MG tablet, Take 0.5 tablets (12.5 mg) by mouth daily., Disp: , Rfl:     tacrolimus (GENERIC EQUIVALENT) 0.5 MG capsule, Take 1 capsule (0.5 mg) by mouth every evening. Total dose: 3mg in AM and 2.5mg in PM, Disp: 90 capsule, Rfl: 3    tacrolimus (GENERIC EQUIVALENT) 1 MG capsule, Take 3 capsules (3 mg) by mouth every morning AND 2 capsules (2 mg) every evening. Total dose: 3mg in AM and 2.5mg in PM., Disp: 150 capsule, Rfl: 11    thiamine (B-1) 100 MG tablet, Take 1 tablet (100 mg) by mouth daily., Disp: , Rfl:     traMADol 25 MG TABS tablet, Take 1 tablet (25 mg) by mouth every 4 hours as needed for moderate pain. (Patient not taking: Reported on 5/14/2025), Disp: 20 tablet, Rfl: 0    venlafaxine (EFFEXOR XR) 37.5 MG 24 hr capsule, Take 37.5 mg by mouth daily., Disp: , Rfl:     wound support modular (EXPEDITE) LIQD bottle, Take 60 mLs by mouth daily. (Patient not taking: Reported on 5/14/2025), Disp: , Rfl:     REVIEW OF SYSTEMS:  10-point ROS reviewed and negative other than that mentioned in HPI.     Objective   ECOG PS: 1     PHYSICAL EXAM:  **Limited given video visit**  General: Patient appears well  in no acute distress.   Skin: No rashes or lesions on visualized skin  Eyes: EOMI, no erythema, sclera icterus or discharge noted  Resp: Appears to be breathing comfortably without accessory muscle usage, speaking in full sentences, no cough  Neurologic: No apparent tremors, facial movements symmetric  Psych: Affect bright, alert and oriented     LABS:  I reviewed the following labs:  Lab Results   Component Value Date    WBC 7.3 05/14/2025    HGB 9.8 (L) 05/14/2025    HCT 32.0 (L) 05/14/2025     (H) 05/14/2025     05/14/2025    INR 1.26 (H) 11/01/2024    PTT 30 11/01/2024     Lab Results   Component Value Date     05/14/2025    POTASSIUM 5.1 05/14/2025    CR 1.15 05/14/2025    BUN 39.6 (H) 05/14/2025    CHLORIDE 108 (H) 05/14/2025    ERIN 9.0 05/14/2025     (H) 05/14/2025      Lab Results   Component Value Date    ALT 54 05/14/2025    AST 38 05/14/2025    ALKPHOS 68 05/14/2025    BILITOTAL 0.7 05/14/2025    BILIDIRECT 0.15 08/24/2015      Lab Results   Component Value Date     (H) 05/14/2025    URIC 5.9 04/16/2024      PET Oncology Whole Body (05/15/2025 11:48 AM)   IMPRESSION:  Resolution of previously FDG avid wall thickening in the distal duodenum, suggesting complete response to therapy.    Assessment & Plan   #Stage IE PTLD of duodenum, late-onset/EBV-negative  #Melena/acute blood anemia 2/2 above  Pleasant 68 year old male w/ hx of lung transplant in 2013 who presented with melena/acute blood anemia and was found to have a 5 cm segment of ulcerated/friable mucosa in the second portion of the duodenum on colonoscopy in 3/2024. Biopsy showed DLBCL, germinal center subtype, EBV negative. FISH negative for MYC and BCL6 rearrangements. 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   - We previously reviewed the overall diagnosis and prognosis of  DLBCL/PTLD. Given his other significant comorbidities, started with single-agent rituximab weekly x 4 doses. His immunosuppression has already been reduced with stopping of azathioprine, which is also a key part of treatment.   - Completed 4 doses of weekly rituximab from 4/25/24 to 5/15/24, tolerated well. 6/26/24 restaging PET showed good partial response with significantly decreased hypermetabolism in distal duodenum (SUVmax 23.8 -> 6.0). Still slightly above liver uptake. Was considering consolidative rituximab q2 months x 4 more doses but given issues with non-healing foot ulcers and upcoming vascular surgery, we decided to hold off and just monitor.   - 5/1525 PET now shows CR with resolution of previously FDG-avid wall thickening in distal duodenum! He will have EGD soon as well.   - Continue surveillance with visit and PET q6 months through 2 years, then will transition care back to transplant team.      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 Appreciate them stopping azathioprine already.  - Now just on tacrolimus and low-dose prednisone for immunosuppresion. Mgmt per Transplant team.   - Ppx: azithro, dapsone.      #CAD s/p staged PCI Jan ([facility] to LAD)/Feb 2024 ([facility] to RCA)  #PAD and critical limb ischemia s/p bilateral BKA's (R 8/28/24, L 9/25/24)  #Recurrent DVT/PE, hx of HIT  Following with Cardiology, Vascular Medicine, and classical Heme. Was already on aspirin/Plavix for PAD when he developed unstable angina and was found to have multiple blockages, had staged PCI. Had non-healing ulcers and critical limb ischemia this summer and had to undergo bilateral below the knee amputations. Course c/b recurrent PE.   - On aspirin and now back on Eliquis.     #GERD  #Dysphagia  Following with GI. Will be having VSS and EGD soon.      PLAN:  - RTC in 6 months with PET    Total of 25 minutes on patient visit, reviewing records, interpreting test results, placing  orders, and documentation on the day of service.    Amber Drake MD  Attending Physician, [facility]

## 2025-05-29 NOTE — NURSING NOTE
Current patient location: Daughter's house    Is the patient currently in the state of [place]? YES    Visit mode: VIDEO    If the visit is dropped, the patient can be reconnected by:VIDEO VISIT: Text to cell phone:   Telephone Information:   Mobile [phone]       Will anyone else be joining the visit? NO  (If patient encounters technical issues they should call [phone]    Are changes needed to the allergy or medication list? No    Are refills needed on medications prescribed by this physician? NO    Rooming Documentation:  Questionnaire(s) completed    Reason for visit: RECHECK    Jessica DAVIS

## 2025-05-29 NOTE — PROGRESS NOTES
Virtual Visit Details    Type of service:  Video Visit   Video Start Time: 5:35  Video End Time:5:43    Originating Location (pt. Location): Home    Distant Location (provider location):  On-site  Platform used for Video Visit: Damion

## 2025-05-30 ENCOUNTER — LAB (OUTPATIENT)
Dept: LAB | Facility: CLINIC | Age: 70
End: 2025-05-30
Payer: MEDICARE

## 2025-05-30 PROCEDURE — 80197 ASSAY OF TACROLIMUS: CPT | Performed by: INTERNAL MEDICINE

## 2025-06-02 ENCOUNTER — TELEPHONE (OUTPATIENT)
Dept: TRANSPLANT | Facility: CLINIC | Age: 70
End: 2025-06-02
Payer: MEDICARE

## 2025-06-03 ENCOUNTER — MYC MEDICAL ADVICE (OUTPATIENT)
Dept: ONCOLOGY | Facility: CLINIC | Age: 70
End: 2025-06-03
Payer: MEDICARE

## 2025-06-03 NOTE — TELEPHONE ENCOUNTER
K+ 5.4 on 5/30.  Reviewed by Dr. Bishop.  Plan for pt to take lokelma 10 g x2 doses.  Pt has extra lokelma at home and can start 6/2 PM.  Repeat labs next Monday as scheduled.  Reiterated low potassium diet.  Encouraged pt to discuss possible need for scheduled lokelma with PCP next Monday.

## 2025-06-04 LAB
TACROLIMUS BLD-MCNC: 6.4 UG/L (ref 5–15)
TME LAST DOSE: NORMAL H
TME LAST DOSE: NORMAL H

## 2025-06-05 ENCOUNTER — MYC MEDICAL ADVICE (OUTPATIENT)
Dept: TRANSPLANT | Facility: CLINIC | Age: 70
End: 2025-06-05
Payer: MEDICARE

## 2025-06-05 DIAGNOSIS — Z94.2 S/P LUNG TRANSPLANT (H): ICD-10-CM

## 2025-06-05 RX ORDER — TACROLIMUS 0.5 MG/1
0.5 CAPSULE ORAL EVERY EVENING
Status: SHIPPED
Start: 2025-06-05

## 2025-06-05 RX ORDER — TACROLIMUS 1 MG/1
CAPSULE ORAL
Qty: 180 CAPSULE | Refills: 11 | Status: SHIPPED | OUTPATIENT
Start: 2025-06-05

## 2025-06-05 NOTE — TELEPHONE ENCOUNTER
Saulo Burgos,     Your tacrolimus level from 5/30 came back at 6.4  Goal 8-10  It looks like this was close to a 12 hour level      I have that you are currently taking 3mg in AM and 2.5mg in PM     If that's correct, can you increase your dose to 3mg in AM and 3mg in PM?  Recheck level in 7-10 days     Pt understanding of plan

## 2025-06-09 ENCOUNTER — TELEPHONE (OUTPATIENT)
Dept: TRANSPLANT | Facility: CLINIC | Age: 70
End: 2025-06-09
Payer: MEDICARE

## 2025-06-09 NOTE — TELEPHONE ENCOUNTER
DATE:  6/9/2025     TIME OF RECEIPT FROM LAB:  3:56 PM    ORDERING PROVIDER: Dr. Murphy    LAB TEST:  K+    LAB VALUE:  6.1    RESULTS GIVEN WITH READ-BACK TO (PROVIDER):  JUAN Galicia    TIME LAB VALUE REPORTED TO PROVIDER:   4:01 PM

## 2025-06-09 NOTE — TELEPHONE ENCOUNTER
K+ 6.1 on 6/9.  Reviewed with Dr. Murphy.  Plan for pt to be seen in local ER.  Updated pt and his wife.  They will go to [facility].

## 2025-06-10 NOTE — PROGRESS NOTES
"Pt seen in ED at [facility].    Assessment & Plan  1. Hyperkalemia.  His potassium level is elevated at 6.1, which is concerning but not critical enough to necessitate hospitalization. A repeat potassium test will be ordered to monitor the levels. Based on the results, appropriate treatment will be determined. He will be given a dose of Lokelma in the clinic today to help lower the potassium levels.     2. Hypertension.  His blood pressure has not been under control. He is currently on carvedilol 25 mg twice a day. Amlodipine will be added to his regimen to better manage his blood pressure.    Pt sent [employer] message stating \"Dr. Rasmussen recommended stopping his Spironolactone as it can cause high potassium.  Dr Patterson put him on it when he had sepsis and bladder infection last Aug or Sept. His heart is good now.  Dr Rasmussen also thought if he needed more water pill after going off it u could increase his Lasix.   Dieudonne will have his potassium tested sometime  tomorrow  after having a dose of Lokema at the hospital.   He will start tomorrow morning NOT taking his Spironolactone.\"    Sent to Dr. Murphy for review.      "

## 2025-06-11 NOTE — TELEPHONE ENCOUNTER
DIAGNOSIS:   Rupture of right proximal biceps tendon [S46.211A]  - Primary   APPOINTMENT DATE: 06/18/2025   NOTES STATUS DETAILS   OFFICE NOTE from referring provider Care Everywhere 05/28/2025 - Arielle Lu MD -    INJECTIONS DONE IN RADIOLOGY N/A :  05/19/2025 - RT Shoulder Injection.   CT SCAN PACS Internal   XRAYS (IMAGES & REPORTS) PACS Internal

## 2025-06-12 NOTE — PROGRESS NOTES
Repeat K+ 5 on 6/11.    Pt reminded to drink adequate fluids and follow strict low potassium diet.   Repeat labs next week with clinic appointment.

## 2025-06-16 DIAGNOSIS — Z94.2 LUNG REPLACED BY TRANSPLANT (H): Primary | ICD-10-CM
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
## 2025-06-18 ENCOUNTER — TELEPHONE (OUTPATIENT)
Dept: ORTHOPEDICS | Facility: CLINIC | Age: 70
End: 2025-06-18

## 2025-06-18 ENCOUNTER — PRE VISIT (OUTPATIENT)
Dept: ORTHOPEDICS | Facility: CLINIC | Age: 70
End: 2025-06-18

## 2025-06-18 ENCOUNTER — OFFICE VISIT (OUTPATIENT)
Dept: ORTHOPEDICS | Facility: CLINIC | Age: 70
End: 2025-06-18
Attending: FAMILY MEDICINE
Payer: MEDICARE

## 2025-06-18 ENCOUNTER — RESULTS FOLLOW-UP (OUTPATIENT)
Dept: TRANSPLANT | Facility: CLINIC | Age: 70
End: 2025-06-18

## 2025-06-18 ENCOUNTER — OFFICE VISIT (OUTPATIENT)
Dept: PULMONOLOGY | Facility: CLINIC | Age: 70
End: 2025-06-18
Attending: INTERNAL MEDICINE
Payer: MEDICARE

## 2025-06-18 ENCOUNTER — ANCILLARY PROCEDURE (OUTPATIENT)
Dept: GENERAL RADIOLOGY | Facility: CLINIC | Age: 70
End: 2025-06-18
Attending: ORTHOPAEDIC SURGERY
Payer: MEDICARE

## 2025-06-18 ENCOUNTER — LAB (OUTPATIENT)
Dept: LAB | Facility: CLINIC | Age: 70
End: 2025-06-18
Attending: PHYSICIAN ASSISTANT
Payer: MEDICARE

## 2025-06-18 ENCOUNTER — ANCILLARY PROCEDURE (OUTPATIENT)
Dept: GENERAL RADIOLOGY | Facility: CLINIC | Age: 70
End: 2025-06-18
Attending: PHYSICIAN ASSISTANT
Payer: MEDICARE

## 2025-06-18 VITALS
SYSTOLIC BLOOD PRESSURE: 148 MMHG | HEIGHT: 68 IN | WEIGHT: 150 LBS | BODY MASS INDEX: 22.73 KG/M2 | OXYGEN SATURATION: 96 % | HEART RATE: 75 BPM | DIASTOLIC BLOOD PRESSURE: 81 MMHG

## 2025-06-18 DIAGNOSIS — Z94.2 LUNG REPLACED BY TRANSPLANT (H): ICD-10-CM

## 2025-06-18 DIAGNOSIS — M75.121 NONTRAUMATIC COMPLETE TEAR OF RIGHT ROTATOR CUFF: Primary | ICD-10-CM

## 2025-06-18 DIAGNOSIS — T86.810 CHRONIC REJECTION OF ALLOGRAFT LUNG (H): ICD-10-CM

## 2025-06-18 DIAGNOSIS — I10 ESSENTIAL HYPERTENSION: Chronic | ICD-10-CM

## 2025-06-18 DIAGNOSIS — Z94.2 S/P LUNG TRANSPLANT (H): Chronic | ICD-10-CM

## 2025-06-18 DIAGNOSIS — Z94.2 LUNG REPLACED BY TRANSPLANT (H): Primary | ICD-10-CM

## 2025-06-18 DIAGNOSIS — M25.511 RIGHT SHOULDER PAIN, UNSPECIFIED CHRONICITY: ICD-10-CM

## 2025-06-18 DIAGNOSIS — Z79.52 LONG TERM (CURRENT) USE OF SYSTEMIC STEROIDS: ICD-10-CM

## 2025-06-18 DIAGNOSIS — E83.42 HYPOMAGNESEMIA: ICD-10-CM

## 2025-06-18 DIAGNOSIS — E11.9 DIABETES MELLITUS TYPE 2, DIET-CONTROLLED (H): ICD-10-CM

## 2025-06-18 DIAGNOSIS — Z94.2 S/P LUNG TRANSPLANT (H): ICD-10-CM

## 2025-06-18 DIAGNOSIS — S46.211A RUPTURE OF RIGHT PROXIMAL BICEPS TENDON, INITIAL ENCOUNTER: ICD-10-CM

## 2025-06-18 LAB
ALBUMIN SERPL BCG-MCNC: 3.9 G/DL (ref 3.5–5.2)
ALP SERPL-CCNC: 71 U/L (ref 40–150)
ALT SERPL W P-5'-P-CCNC: 61 U/L (ref 0–70)
ANION GAP SERPL CALCULATED.3IONS-SCNC: 9 MMOL/L (ref 7–15)
AST SERPL W P-5'-P-CCNC: 44 U/L (ref 0–45)
BACTERIA SPT CULT: NORMAL
BASOPHILS # BLD AUTO: 0 10E3/UL (ref 0–0.2)
BASOPHILS NFR BLD AUTO: 0 %
BILIRUB SERPL-MCNC: 0.6 MG/DL
BUN SERPL-MCNC: 48.7 MG/DL (ref 8–23)
CALCIUM SERPL-MCNC: 8.9 MG/DL (ref 8.8–10.4)
CHLORIDE SERPL-SCNC: 109 MMOL/L (ref 98–107)
CHOLEST SERPL-MCNC: 173 MG/DL
CMV DNA SPEC NAA+PROBE-ACNC: NOT DETECTED IU/ML
CREAT SERPL-MCNC: 1.26 MG/DL (ref 0.67–1.17)
EGFRCR SERPLBLD CKD-EPI 2021: 61 ML/MIN/1.73M2
EOSINOPHIL # BLD AUTO: 0.2 10E3/UL (ref 0–0.7)
EOSINOPHIL NFR BLD AUTO: 2 %
ERYTHROCYTE [DISTWIDTH] IN BLOOD BY AUTOMATED COUNT: 14.2 % (ref 10–15)
EXPTIME-PRE: 6.36 SEC
FASTING STATUS PATIENT QL REPORTED: YES
FASTING STATUS PATIENT QL REPORTED: YES
FEF2575-%PRED-PRE: 133 %
FEF2575-PRE: 2.86 L/SEC
FEF2575-PRED: 2.13 L/SEC
FEFMAX-%PRED-PRE: 128 %
FEFMAX-PRE: 9.91 L/SEC
FEFMAX-PRED: 7.71 L/SEC
FEV1-%PRED-PRE: 96 %
FEV1-PRE: 2.66 L
FEV1FEV6-PRE: 81 %
FEV1FEV6-PRED: 77 %
FEV1FVC-PRE: 81 %
FEV1FVC-PRED: 77 %
FIFMAX-PRE: 7.84 L/SEC
FVC-%PRED-PRE: 91 %
FVC-PRE: 3.27 L
FVC-PRED: 3.59 L
GLUCOSE SERPL-MCNC: 85 MG/DL (ref 70–99)
GRAM STAIN RESULT: NORMAL
HCO3 SERPL-SCNC: 23 MMOL/L (ref 22–29)
HCT VFR BLD AUTO: 32.3 % (ref 40–53)
HDLC SERPL-MCNC: 50 MG/DL
HGB BLD-MCNC: 10 G/DL (ref 13.3–17.7)
IGG SERPL-MCNC: 700 MG/DL (ref 610–1616)
IMM GRANULOCYTES # BLD: 0.1 10E3/UL
IMM GRANULOCYTES NFR BLD: 1 %
LDLC SERPL CALC-MCNC: 95 MG/DL
LYMPHOCYTES # BLD AUTO: 1.6 10E3/UL (ref 0.8–5.3)
LYMPHOCYTES NFR BLD AUTO: 19 %
MAGNESIUM SERPL-MCNC: 2.4 MG/DL (ref 1.7–2.3)
MCH RBC QN AUTO: 34.7 PG (ref 26.5–33)
MCHC RBC AUTO-ENTMCNC: 31 G/DL (ref 31.5–36.5)
MCV RBC AUTO: 112 FL (ref 78–100)
MONOCYTES # BLD AUTO: 0.8 10E3/UL (ref 0–1.3)
MONOCYTES NFR BLD AUTO: 10 %
NEUTROPHILS # BLD AUTO: 5.7 10E3/UL (ref 1.6–8.3)
NEUTROPHILS NFR BLD AUTO: 67 %
NONHDLC SERPL-MCNC: 123 MG/DL
NRBC # BLD AUTO: 0 10E3/UL
NRBC BLD AUTO-RTO: 0 /100
PHOSPHATE SERPL-MCNC: 3.7 MG/DL (ref 2.5–4.5)
PLATELET # BLD AUTO: 127 10E3/UL (ref 150–450)
POTASSIUM SERPL-SCNC: 5 MMOL/L (ref 3.4–5.3)
PROT SERPL-MCNC: 6.1 G/DL (ref 6.4–8.3)
RBC # BLD AUTO: 2.88 10E6/UL (ref 4.4–5.9)
SODIUM SERPL-SCNC: 141 MMOL/L (ref 135–145)
SPECIMEN TYPE: NORMAL
TACROLIMUS BLD-MCNC: 11.8 UG/L (ref 5–15)
TME LAST DOSE: NORMAL H
TME LAST DOSE: NORMAL H
TRIGL SERPL-MCNC: 141 MG/DL
VIT D+METAB SERPL-MCNC: 39 NG/ML (ref 20–50)
WBC # BLD AUTO: 8.5 10E3/UL (ref 4–11)

## 2025-06-18 PROCEDURE — 85025 COMPLETE CBC W/AUTO DIFF WBC: CPT | Performed by: PATHOLOGY

## 2025-06-18 PROCEDURE — 86832 HLA CLASS I HIGH DEFIN QUAL: CPT | Performed by: INTERNAL MEDICINE

## 2025-06-18 PROCEDURE — 87070 CULTURE OTHR SPECIMN AEROBIC: CPT | Performed by: PHYSICIAN ASSISTANT

## 2025-06-18 PROCEDURE — 84100 ASSAY OF PHOSPHORUS: CPT | Performed by: PATHOLOGY

## 2025-06-18 PROCEDURE — 87102 FUNGUS ISOLATION CULTURE: CPT | Performed by: PHYSICIAN ASSISTANT

## 2025-06-18 PROCEDURE — 80197 ASSAY OF TACROLIMUS: CPT | Performed by: INTERNAL MEDICINE

## 2025-06-18 PROCEDURE — 36415 COLL VENOUS BLD VENIPUNCTURE: CPT | Performed by: PATHOLOGY

## 2025-06-18 PROCEDURE — G0463 HOSPITAL OUTPT CLINIC VISIT: HCPCS | Performed by: PHYSICIAN ASSISTANT

## 2025-06-18 PROCEDURE — 83735 ASSAY OF MAGNESIUM: CPT | Performed by: PATHOLOGY

## 2025-06-18 PROCEDURE — 86833 HLA CLASS II HIGH DEFIN QUAL: CPT | Performed by: INTERNAL MEDICINE

## 2025-06-18 PROCEDURE — 71046 X-RAY EXAM CHEST 2 VIEWS: CPT | Performed by: STUDENT IN AN ORGANIZED HEALTH CARE EDUCATION/TRAINING PROGRAM

## 2025-06-18 PROCEDURE — 82784 ASSAY IGA/IGD/IGG/IGM EACH: CPT | Performed by: INTERNAL MEDICINE

## 2025-06-18 PROCEDURE — 80053 COMPREHEN METABOLIC PANEL: CPT | Performed by: PATHOLOGY

## 2025-06-18 PROCEDURE — 82306 VITAMIN D 25 HYDROXY: CPT | Performed by: INTERNAL MEDICINE

## 2025-06-18 PROCEDURE — 80061 LIPID PANEL: CPT | Performed by: PATHOLOGY

## 2025-06-18 PROCEDURE — 99000 SPECIMEN HANDLING OFFICE-LAB: CPT | Performed by: PATHOLOGY

## 2025-06-18 RX ORDER — MAGNESIUM OXIDE 400 MG/1
400 TABLET ORAL DAILY
Status: SHIPPED
Start: 2025-06-18

## 2025-06-18 RX ORDER — TACROLIMUS 1 MG/1
CAPSULE ORAL
Qty: 150 CAPSULE | Refills: 11 | Status: SHIPPED | OUTPATIENT
Start: 2025-06-18

## 2025-06-18 RX ORDER — AMLODIPINE BESYLATE 5 MG/1
5 TABLET ORAL DAILY
Status: SHIPPED
Start: 2025-06-18

## 2025-06-18 RX ORDER — INSULIN ASPART 100 [IU]/ML
INJECTION, SOLUTION INTRAVENOUS; SUBCUTANEOUS
Status: SHIPPED
Start: 2025-06-18

## 2025-06-18 RX ORDER — FLUTICASONE PROPIONATE AND SALMETEROL XINAFOATE 230; 21 UG/1; UG/1
2 AEROSOL, METERED RESPIRATORY (INHALATION) 2 TIMES DAILY
Status: SHIPPED
Start: 2025-06-18

## 2025-06-18 RX ORDER — TACROLIMUS 0.5 MG/1
0.5 CAPSULE ORAL EVERY EVENING
Qty: 30 CAPSULE | Refills: 11 | Status: SHIPPED | OUTPATIENT
Start: 2025-06-18

## 2025-06-18 RX ORDER — INSULIN GLARGINE 100 [IU]/ML
18 INJECTION, SOLUTION SUBCUTANEOUS EVERY MORNING
Status: SHIPPED
Start: 2025-06-18

## 2025-06-18 RX ORDER — CARVEDILOL 6.25 MG/1
12.5 TABLET ORAL 2 TIMES DAILY WITH MEALS
Status: SHIPPED
Start: 2025-06-18

## 2025-06-18 ASSESSMENT — PAIN SCALES - GENERAL: PAINLEVEL_OUTOF10: NO PAIN (0)

## 2025-06-18 NOTE — PATIENT INSTRUCTIONS
Patient Instructions  1. Continue to hydrate with 60-70 oz fluids daily.  2. Continue to exercise daily or most days of the week.  3. Follow up with your primary care provider for annual gender health maintenance procedures.  4. Follow up with colonoscopy schedule.  5. Follow up with annual dermatology visits.  6. It doesn't seem like the COVID vaccine is working well in lung transplant patients. A number of lung transplant patients have gotten sick with COVID even after receiving the vaccines. Based on our recent experience, it can be life-threatening to get COVID  even after being vaccinated. Please continue to act like you did not get the COVID vaccine - social distancing, wearing a mask, good hand hygiene, etc. If the people around you are vaccinated, it will help reduce the risk of you getting COVID. All members of your household should be vaccinated.  7. Plan to recheck labs next week.  8. Decrease your tacrolimus dose 3 mg in the morning and 2.5 mg in the evening  9. Wear a mask when you are mowing  10. Repeat PFT's in 2-3 weeks  11. Decrease magnesium to 1 tablet (400 mg) daily    Next transplant clinic appointment: TBD months with CXR, labs and PFTs  Next lab draw: pending tacrolimus    ~~~~~~~~~~~~~~~~~~~~~~~~~    Thoracic Transplant Office phone [phone] (alt [phone]), fax [phone]    Office Hours 8:30 - 5:00     For after-hours urgent issues, please dial [phone] (alt [phone]) and ask the  to page the Thoracic Transplant Coordinator On-Call.   --------------------  To expedite your medication refill(s), please contact your pharmacy and have them fax a refill request to: [phone]    *Please allow 3 business days for routine medication refills.  *Please allow 5 business days for controlled substance medication refills.    **For Diabetic medications and supplies refill(s), please contact your pharmacy and have them contact your Endocrine team.  --------------------  For  scheduling appointments call [phone] (alt [phone])  --------------------  Please Note: If you are active on i-nexus, all future test results will be sent by i-nexus message only, and will no longer be called to patient. You may also receive communication directly from your physician.
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
## 2025-06-18 NOTE — PROGRESS NOTES
Transplant Coordinator Note    Reason for visit: Post lung transplant follow up visit   Coordinator: Present   Caregiver:  wife, Kyung    Health concerns addressed today:  1. Respiratory: PFT's declining. Feels more congestion in chest, plan for sputum sample.   2. GI: no acid reflux, occasional diarrhea -takes imodium daily. Upcoming swallow study, difficulty swallowing.   3. Annuals done locally   4. Tac level 11.1 on 6/13, 12 hour level?  5. Allergies  6. Creat 1.26- recheck labs next week  7. Magnesium 2.4 - decrease dose    Activity/rehab: walking with cane/wheelchair  Oxygen needs: none  AC/asa: aspirin/eliquis  PJP prophylactic: dapsone    COVID:  COVID-19 infection (yes/no, date of most recent positive test):   Status/instructions given about COVID-19 vaccine:     Pt Education: medications (use/dose/side effects), how/when to call coordinator, frequency of labs, s/s of infection/rejection, call prior to starting any new medications, lab/vital sign book    Health Maintenance:   Last colonoscopy:   Next colonoscopy due:   Dermatology:  Vaccinations this visit:     Labs, CXR, PFTs reviewed with patient  Medication record reviewed and reconciled  Questions and concerns addressed    Patient Instructions  1. Continue to hydrate with 60-70 oz fluids daily.  2. Continue to exercise daily or most days of the week.  3. Follow up with your primary care provider for annual gender health maintenance procedures.  4. Follow up with colonoscopy schedule.  5. Follow up with annual dermatology visits.  6. It doesn't seem like the COVID vaccine is working well in lung transplant patients. A number of lung transplant patients have gotten sick with COVID even after receiving the vaccines. Based on our recent experience, it can be life-threatening to get COVID  even after being vaccinated. Please continue to act like you did not get the COVID vaccine - social distancing, wearing a mask, good hand hygiene, etc. If the people around  you are vaccinated, it will help reduce the risk of you getting COVID. All members of your household should be vaccinated.  7. Plan to recheck labs next week.  8. Decrease your tacrolimus dose 3 mg in the morning and 2.5 mg in the evening  9. Wear a mask when you are mowing  10. Repeat PFT's in 2-3 weeks  11. Decrease magnesium to 1 tablet (400 mg) daily    Next transplant clinic appointment: TBD months with CXR, labs and PFTs  Next lab draw: pending tacrolimus    AVS printed at time of check out
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
## 2025-06-18 NOTE — NURSING NOTE
Chief Complaint   Patient presents with    RECHECK     Return Lung TX     Medications reviewed and vital signs taken.   Chris Ross, CMA

## 2025-06-18 NOTE — TELEPHONE ENCOUNTER
Patient confirmed rescheduled appointment:  Date: 6/18/25  Time: 12:40 to 12:00pm  Visit type: New Shoulder  Provider: Dr. Leong  Location: [facility]

## 2025-06-19 ENCOUNTER — TELEPHONE (OUTPATIENT)
Dept: SPEECH THERAPY | Facility: CLINIC | Age: 70
End: 2025-06-19
Payer: MEDICARE

## 2025-06-19 NOTE — TELEPHONE ENCOUNTER
[facility] Call Center    Phone Message    May a detailed message be left on voicemail: yes     Reason for Call: Other: Patient calling to cancel all 3 appointments for tomorrow 6/20 with Monie Rubio. Patient has a cold and will call back another time to reschedule. Thank you.    Action Taken: Message routed to:  [facility] ENT    Travel Screening: Not Applicable

## 2025-06-19 NOTE — TELEPHONE ENCOUNTER
Patient confirmed scheduled appointment:  Date: 7/23  Time: 1pm  Visit type: VFSS  Provider: Nikole DON  Location: [facility]  Testing/imaging: No  Additional notes: No

## 2025-06-19 NOTE — PROGRESS NOTES
Pt sends the following [employer] message:   I was in to see you yesterday 6/18/2025 and my PFT'S dropped again. Figured out why, came down with a cold in my chest and throat. Will wait till I get over it then get PFT'S done over again.     Reviewed with patient:  - RVP done locally   - drop off sputum sample if able

## 2025-06-19 NOTE — LETTER
PHYSICIAN ORDERS      DATE & TIME ISSUED: 2025 9:19 AM  PATIENT NAME: Aubrey Duncan   : 1955     [facility] MR# [if applicable]: [id number]     DIAGNOSIS:  Lung Transplant  Z94.2    Order to check Respiratory viral panel PCR swab or whatever tests you have that are an equivalent viral panel (Adenovirus, Coronavirus, Human Metapneumovirus, Human Rhinovirus/Enterovirus, Influenza A (H1, H3, 2009 H1N1), Influenza B, Respiratory Syncytial Virus (RSV) A, Respiratory Syncytial Virus (RSV) B, Parainfluenza Virus (1, 2, 3 and 4), Chlamydia pneumoniae and Mycoplasma pneumoniae.) and COVID 19 PCR swab    Any questions please call: [place] [phone]    Please fax these results to [phone].         Alma Murphy MD  Pulmonary Disease       
PHYSICIAN ORDERS      DATE & TIME ISSUED: 2025 9:20 AM  PATIENT NAME: Aubrey Duncan   : 1955     [facility] MR# [if applicable]: [id number]     DIAGNOSIS:  Lung Transplant  Z94.2    Order to collect Aerobic bacterial culture sputum and fungal sputum culture 25    Any questions please call: [place] [phone]    Please fax these results to [phone].         Alma Murphy MD  Pulmonary Disease       
Good

## 2025-06-22 LAB
NTRA CURRENT TEST RESULT: NORMAL
NTRA PATIENT TEST HISTORY: NORMAL

## 2025-06-23 ENCOUNTER — RESULTS FOLLOW-UP (OUTPATIENT)
Dept: TRANSPLANT | Facility: CLINIC | Age: 70
End: 2025-06-23

## 2025-06-23 ENCOUNTER — TRANSFERRED RECORDS (OUTPATIENT)
Dept: FAMILY MEDICINE | Facility: OTHER | Age: 70
End: 2025-06-23
Payer: MEDICARE

## 2025-06-23 VITALS — DIASTOLIC BLOOD PRESSURE: 74 MMHG | SYSTOLIC BLOOD PRESSURE: 130 MMHG

## 2025-06-23 RX ORDER — METOCLOPRAMIDE 5 MG/1
5 TABLET ORAL EVERY 6 HOURS PRN
Qty: 30 TABLET | Refills: 0 | OUTPATIENT
Start: 2025-06-23

## 2025-06-23 NOTE — PROGRESS NOTES
Patient Quality Outreach    Patient is due for the following:   IVD  -  BP Check    Action(s) Taken:   No follow up needed at this time.    Type of outreach:    Chart review performed, no outreach needed. Blood pressure taken at last office visit at [facility].    Questions for provider review:    None         Jenna Aguilar RN

## 2025-06-27 ENCOUNTER — RESULTS FOLLOW-UP (OUTPATIENT)
Dept: TRANSPLANT | Facility: CLINIC | Age: 70
End: 2025-06-27

## 2025-07-02 ENCOUNTER — OFFICE VISIT (OUTPATIENT)
Dept: DERMATOLOGY | Facility: CLINIC | Age: 70
End: 2025-07-02
Payer: MEDICARE

## 2025-07-02 DIAGNOSIS — Z94.2 HISTORY OF LUNG TRANSPLANT (H): ICD-10-CM

## 2025-07-02 DIAGNOSIS — D18.01 ANGIOMA OF SKIN: ICD-10-CM

## 2025-07-02 DIAGNOSIS — Z85.828 HISTORY OF SKIN CANCER: ICD-10-CM

## 2025-07-02 DIAGNOSIS — A49.8 INFECTION, PSEUDOMONAS: Primary | ICD-10-CM

## 2025-07-02 DIAGNOSIS — Z94.2 S/P LUNG TRANSPLANT (H): ICD-10-CM

## 2025-07-02 DIAGNOSIS — L82.1 SEBORRHEIC KERATOSES: ICD-10-CM

## 2025-07-02 DIAGNOSIS — C44.622 SQUAMOUS CELL CANCER OF SKIN OF RIGHT FOREARM: ICD-10-CM

## 2025-07-02 DIAGNOSIS — D23.9 DERMAL NEVUS: Primary | ICD-10-CM

## 2025-07-02 DIAGNOSIS — L57.0 AK (ACTINIC KERATOSIS): ICD-10-CM

## 2025-07-02 DIAGNOSIS — L81.4 LENTIGO: ICD-10-CM

## 2025-07-02 PROCEDURE — 99203 OFFICE O/P NEW LOW 30 MIN: CPT | Mod: 25 | Performed by: DERMATOLOGY

## 2025-07-02 PROCEDURE — 88331 PATH CONSLTJ SURG 1 BLK 1SPC: CPT | Performed by: DERMATOLOGY

## 2025-07-02 PROCEDURE — 11102 TANGNTL BX SKIN SINGLE LES: CPT | Performed by: DERMATOLOGY

## 2025-07-02 PROCEDURE — 17003 DESTRUCT PREMALG LES 2-14: CPT | Performed by: DERMATOLOGY

## 2025-07-02 PROCEDURE — 17000 DESTRUCT PREMALG LESION: CPT | Mod: 59 | Performed by: DERMATOLOGY

## 2025-07-02 RX ORDER — TACROLIMUS 0.5 MG/1
0.5 CAPSULE ORAL 2 TIMES DAILY
Qty: 60 CAPSULE | Refills: 11 | Status: SHIPPED | OUTPATIENT
Start: 2025-07-02

## 2025-07-02 RX ORDER — TACROLIMUS 1 MG/1
2 CAPSULE ORAL 2 TIMES DAILY
Qty: 120 CAPSULE | Refills: 11 | Status: SHIPPED | OUTPATIENT
Start: 2025-07-02

## 2025-07-02 RX ORDER — CEFDINIR 300 MG/1
300 CAPSULE ORAL 2 TIMES DAILY
Qty: 6 CAPSULE | Refills: 0 | Status: SHIPPED | OUTPATIENT
Start: 2025-07-02 | End: 2025-07-05

## 2025-07-02 NOTE — PATIENT INSTRUCTIONS
Wound Care Instructions     FOR SUPERFICIAL WOUNDS ON THE    ______________________________        AFTER 24 HOURS YOU SHOULD REMOVE THE BANDAGE AND BEGIN DAILY DRESSING CHANGES AS FOLLOWS:     1) Remove Dressing.     2) Clean and dry the area once a day with tap water using a Q-tip or sterile gauze pad.     3) Apply Vaseline, Aquaphor, Polysporin ointment or Bacitracin ointment over entire wound.  Do NOT use Neosporin ointment.     4) Cover the wound with a band-aid, or a sterile non-stick gauze pad and micropore paper tape      REPEAT THESE INSTRUCTIONS AT LEAST ONCE A DAY UNTIL THE WOUND HAS COMPLETELY HEALED.    It is an old wives tale that a wound heals better when it is exposed to air and allowed to dry out. The wound will heal faster with a better cosmetic result if it is kept moist with ointment and covered with a bandage.    Do not let the wound dry out.    Supplies Needed:      *Cotton tipped applicators (Q-tips)    *Polysporin Ointment or Bacitracin Ointment (NOT NEOSPORIN)    *Band-aids or non-stick gauze pads and micropore paper tape.    PATIENT INFORMATION    During the healing process you will notice a number of changes. All wounds develop a small halo of redness surrounding the wound.  This means healing is occurring. Severe itching with extensive redness usually indicates sensitivity to the ointment or bandage tape used to dress the wound.  You should call our office if this develops.      Swelling  and/or discoloration around your surgical site is common, particularly when performed around the eye.    All wounds normally drain.  The larger the wound the more drainage there will be.  After 7-10 days, you will notice the wound beginning to shrink and new skin will begin to grow.  The wound is healed when you can see skin has formed over the entire area.  A healed wound has a healthy, shiny look to the surface and is red to dark pink in color to normalize.  Wounds may take approximately 4-6  weeks to heal.  Larger wounds may take 6-8 weeks.  After the wound is healed you may discontinue dressing changes.    You may experience a sensation of tightness as your wound heals. This is normal and will gradually subside.    Your healed wound may be sensitive to temperature changes. This sensitivity improves with time, but if you re having a lot of discomfort, try to avoid temperature extremes.    Patients frequently experience itching after their wound appears to have healed because of the continue healing under the skin.  Plain Vaseline will help relieve the itching.    Dermatology clinic phone: [phone]     Proper skin care from [facility]:    -Eliminate harsh soaps as they strip the natural oils from the skin, often resulting in dry itchy skin ( i.e. Dial, Zest, Meron [place])  -Use mild soaps such as Cetaphil or Dove Sensitive Skin in the shower. You do not need to use soap on arms, legs, and trunk every time you shower unless visibly soiled.   -Avoid hot or cold showers.  -After showering, lightly dry off and apply moisturizing within 2-3 minutes. This will help trap moisture in the skin.   -Aggressive use of a moisturizer at least 1-2 times a day to the entire body (including -Vanicream, Cetaphil, Aquaphor or Cerave) and moisturize hands after every washing.  -We recommend using moisturizers that come in a tub that needs to be scooped out, not a pump. This has more of an oil base. It will hold moisture in your skin much better than a water base moisturizer. The above recommended are non-pore clogging.      Wear a sunscreen with at least SPF 30 on your face, ears, neck and V of the chest daily. Wear sunscreen on other areas of the body if those areas are exposed to the sun throughout the day. Sunscreens can contain physical and/or chemical blockers. Physical blockers are less likely to clog pores, these include zinc oxide and titanium dioxide. Reapply every two hour and after swimming.      Sunscreen examples: https://www.ewg.org/sunscreen/    UV radiation  UVA radiation remains constant throughout the day and throughout the year. It is a longer wavelength than UVB and therefore penetrates deeper into the skin leading to immediate and delayed tanning, photoaging, and skin cancer. 70-80% of UVA and UVB radiation occurs between the hours of 10am-2pm.  UVB radiation  UVB radiation causes the most harmful effects and is more significant during the summer months. However, snow and ice can reflect UVB radiation leading to skin damage during the winter months as well. UVB radiation is responsible for tanning, burning, inflammation, delayed erythema (pinkness), pigmentation (brown spots), and skin cancer.     I recommend self monthly full body exams and yearly full body exams with a dermatology provider. If you develop a new or changing lesion please follow up for examination. Most skin cancers are pink and scaly or pink and pearly. However, we do see blue/brown/black skin cancers.  Consider the ABCDEs of melanoma when giving yourself your monthly full body exam ( don't forget the groin, buttocks, feet, toes, etc). A-asymmetry, B-borders, C-color, D-diameter, E-elevation or evolving. If you see any of these changes please follow up in clinic. If you cannot see your back I recommend purchasing a hand held mirror to use with a larger wall mirror.       Checking for Skin Cancer  You can find cancer early by checking your skin each month. There are 3 kinds of skin cancer. They are melanoma, basal cell carcinoma, and squamous cell carcinoma. Doing monthly skin checks is the best way to find new marks or skin changes. Follow the instructions below for checking your skin.   The ABCDEs of checking moles for melanoma   Check your moles or growths for signs of melanoma using ABCDE:   Asymmetry: the sides of the mole or growth don t match  Border: the edges are ragged, notched, or blurred  Color: the color within the  mole or growth varies  Diameter: the mole or growth is larger than 6 mm (size of a pencil eraser)  Evolving: the size, shape, or color of the mole or growth is changing (evolving is not shown in the images below)    Checking for other types of skin cancer  Basal cell carcinoma or squamous cell carcinoma have symptoms such as:     A spot or mole that looks different from all other marks on your skin  Changes in how an area feels, such as itching, tenderness, or pain  Changes in the skin's surface, such as oozing, bleeding, or scaliness  A sore that does not heal  New swelling or redness beyond the border of a mole    Who s at risk?  Anyone can get skin cancer. But you are at greater risk if you have:   Fair skin, light-colored hair, or light-colored eyes  Many moles or abnormal moles on your skin  A history of sunburns from sunlight or tanning beds  A family history of skin cancer  A history of exposure to radiation or chemicals  A weakened immune system  If you have had skin cancer in the past, you are at risk for recurring skin cancer.   How to check your skin  Do your monthly skin checkups in front of a full-length mirror. Check all parts of your body, including your:   Head (ears, face, neck, and scalp)  Torso (front, back, and sides)  Arms (tops, undersides, upper, and lower armpits)  Hands (palms, backs, and fingers, including under the nails)  Buttocks and genitals  Legs (front, back, and sides)  Feet (tops, soles, toes, including under the nails, and between toes)  If you have a lot of moles, take digital photos of them each month. Make sure to take photos both up close and from a distance. These can help you see if any moles change over time.   Most skin changes are not cancer. But if you see any changes in your skin, call your doctor right away. Only he or she can diagnose a problem. If you have skin cancer, seeing your doctor can be the first step toward getting the treatment that could save your life.    Shirin last reviewed this educational content on 4/1/2019 2000-2020 [employer]. [address] [place] [address], [place] [address]. All rights reserved. This information is not intended as a substitute for professional medical care. Always follow your healthcare professional's instructions.       When should I call my doctor?  If you are worsening or not improving, please, contact us or seek urgent care as noted below.     Who should I call with questions (adults)?    [facility] [phone]  For urgent needs outside of business hours call the [facility] at [phone] and ask for the dermatology resident on call to be paged  If this is a medical emergency and you are unable to reach an ER, Call [phone]      If you need a prescription refill, please contact your pharmacy. Refills are approved or denied by our Physicians during normal business hours, Monday through Friday.  Per office policy, refills will not be granted if you have not been seen within the past year (or sooner depending on the condition).
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
## 2025-07-02 NOTE — PROGRESS NOTES
"Per pt's MyChart message \"Dieudonne had a test done when he went into [facility] with a cold. It came back positive for a cold and pseudomonas. The doctor put him on cefdinir 300 mg 14 tab. He wrote asking 4 more [birth year]. If u can read the messages she recommended contacting pulmonary.  Please read them and get back to us.   As it comes to to the [place] area he's still coughing up stuff and clears his throat a lot.   Also has anyone checked his tacrolimus level after June 26th lab. Its high.\"    Decreased pt's tacrolimus dose to 2.5 mg in AM and 2.5 mg in PM.  Repeat labs next week.    Reviewed symptoms with Dr. Murphy.  She recommends extending cefdinir to 10 days total (3 days more).  Plan for pt to start nasal rinses to help with the drainage. If his symptoms get better and then get worse then we may need imaging and IV antibiotics.   "

## 2025-07-03 ENCOUNTER — TELEPHONE (OUTPATIENT)
Dept: DERMATOLOGY | Facility: CLINIC | Age: 70
End: 2025-07-03
Payer: MEDICARE

## 2025-07-03 ENCOUNTER — CARE COORDINATION (OUTPATIENT)
Dept: GASTROENTEROLOGY | Facility: CLINIC | Age: 70
End: 2025-07-03
Payer: MEDICARE

## 2025-07-03 DIAGNOSIS — C44.622 SQUAMOUS CELL CANCER OF SKIN OF RIGHT FOREARM: Primary | ICD-10-CM

## 2025-07-03 NOTE — TELEPHONE ENCOUNTER
----- Message from Osvaldo Urias sent at 7/2/2025 12:38 PM CDT -----  R forearm squamous cell carcinoma schedule excision

## 2025-07-03 NOTE — TELEPHONE ENCOUNTER
Patient Contact    Attempt # 1    Was call answered?  Yes    Spoke with patient and gave him the result to the biopsy from Right forearm. Patient scheduled with Dr. Urias 7/22/25 at 7:45 am. Pre Op letter sent Via [employer] and patient deferred mailed information.      Erika Hung MA      [facility]   [phone]

## 2025-07-04 SDOH — HEALTH STABILITY: PHYSICAL HEALTH: ON AVERAGE, HOW MANY DAYS PER WEEK DO YOU ENGAGE IN MODERATE TO STRENUOUS EXERCISE (LIKE A BRISK WALK)?: 0 DAYS

## 2025-07-04 SDOH — HEALTH STABILITY: PHYSICAL HEALTH: ON AVERAGE, HOW MANY MINUTES DO YOU ENGAGE IN EXERCISE AT THIS LEVEL?: 0 MIN

## 2025-07-09 ENCOUNTER — OFFICE VISIT (OUTPATIENT)
Dept: ORTHOPEDICS | Facility: CLINIC | Age: 70
End: 2025-07-09
Attending: ORTHOPAEDIC SURGERY
Payer: MEDICARE

## 2025-07-09 DIAGNOSIS — G89.29 CHRONIC RIGHT SHOULDER PAIN: Primary | ICD-10-CM

## 2025-07-09 DIAGNOSIS — S46.211A RUPTURE OF RIGHT PROXIMAL BICEPS TENDON, INITIAL ENCOUNTER: ICD-10-CM

## 2025-07-09 DIAGNOSIS — M25.511 CHRONIC RIGHT SHOULDER PAIN: Primary | ICD-10-CM

## 2025-07-09 PROCEDURE — 76942 ECHO GUIDE FOR BIOPSY: CPT | Performed by: FAMILY MEDICINE

## 2025-07-09 PROCEDURE — 64418 NJX AA&/STRD SPRSCAP NRV: CPT | Mod: RT | Performed by: FAMILY MEDICINE

## 2025-07-09 RX ORDER — TRIAMCINOLONE ACETONIDE 40 MG/ML
40 INJECTION, SUSPENSION INTRA-ARTICULAR; INTRAMUSCULAR
Status: COMPLETED | OUTPATIENT
Start: 2025-07-09 | End: 2025-07-09

## 2025-07-09 RX ORDER — LIDOCAINE HYDROCHLORIDE 10 MG/ML
4 INJECTION, SOLUTION EPIDURAL; INFILTRATION; INTRACAUDAL; PERINEURAL
Status: COMPLETED | OUTPATIENT
Start: 2025-07-09 | End: 2025-07-09

## 2025-07-09 RX ADMIN — TRIAMCINOLONE ACETONIDE 40 MG: 40 INJECTION, SUSPENSION INTRA-ARTICULAR; INTRAMUSCULAR at 14:26

## 2025-07-09 RX ADMIN — LIDOCAINE HYDROCHLORIDE 4 ML: 10 INJECTION, SOLUTION EPIDURAL; INFILTRATION; INTRACAUDAL; PERINEURAL at 14:26

## 2025-07-09 NOTE — LETTER
7/9/2025      RE: Aubrey Duncan  [address] [id number]     Dear Colleague,    Thank you for referring your patient, Aubrey Duncan, to the [facility] [place]. Please see a copy of my visit note below.    Mr. Duncan has chronic right shoulder pain.  He is here for a right suprascapular nerve injection.        Large Joint Injection/Arthocentesis    Date/Time: 7/9/2025 2:26 PM    Performed by: Aubrey Monte DO  Authorized by: Aubrey Monte DO    Indications:  Pain  Needle Size:  22 G  Guidance: ultrasound    Approach:  Posterior  Location:  Shoulder   Location comment:  Suprascapular nerve CSI       Medications:  40 mg triamcinolone 40 MG/ML; 4 mL lidocaine (PF) 1 %  Outcome:  Tolerated well, no immediate complications  Procedure discussed: discussed risks, benefits, and alternatives    Consent Given by:  Patient  Timeout: timeout called immediately prior to procedure    Prep: patient was prepped and draped in usual sterile fashion       Suprascapular Nerve Injection - Ultrasound Guided  The patient was informed of the risks and the benefits of the procedure and a written consent was signed.  The patient's right scapula was prepped with chlorhexidine in sterile fashion.   40 mg of triamcinolone suspension was drawn up into a 5 mL syringe with 4 mL of 1% lidocaine.  Injection was performed using sterile technique.  Under ultrasound guidance a 3.5-inch 22-gauge needle was used to visualize the suprascapular nerve at the spinoglenoid notch.  Injection performed in long axis to the probe with a medial to lateral approach.  There were no complications. The patient tolerated the procedure well. There was negligible bleeding.               Again, thank you for allowing me to participate in the care of your patient.      Sincerely,    Aubrey Monte DO  
normal...

## 2025-07-09 NOTE — PROGRESS NOTES
Mr. Duncan has chronic right shoulder pain.  He is here for a right suprascapular nerve injection.        Large Joint Injection/Arthocentesis    Date/Time: 7/9/2025 2:26 PM    Performed by: Aubrey Monte DO  Authorized by: Aubrey Monte DO    Indications:  Pain  Needle Size:  22 G  Guidance: ultrasound    Approach:  Posterior  Location:  Shoulder   Location comment:  Suprascapular nerve CSI       Medications:  40 mg triamcinolone 40 MG/ML; 4 mL lidocaine (PF) 1 %  Outcome:  Tolerated well, no immediate complications  Procedure discussed: discussed risks, benefits, and alternatives    Consent Given by:  Patient  Timeout: timeout called immediately prior to procedure    Prep: patient was prepped and draped in usual sterile fashion       Suprascapular Nerve Injection - Ultrasound Guided  The patient was informed of the risks and the benefits of the procedure and a written consent was signed.  The patient's right scapula was prepped with chlorhexidine in sterile fashion.   40 mg of triamcinolone suspension was drawn up into a 5 mL syringe with 4 mL of 1% lidocaine.  Injection was performed using sterile technique.  Under ultrasound guidance a 3.5-inch 22-gauge needle was used to visualize the suprascapular nerve at the spinoglenoid notch.  Injection performed in long axis to the probe with a medial to lateral approach.  There were no complications. The patient tolerated the procedure well. There was negligible bleeding.

## 2025-07-09 NOTE — NURSING NOTE
[address] [address]  Dept: [phone]  ______________________________________________________________________________    Patient: Aubrey Duncan   : 1955   MRN: [id number]   2025    INVASIVE PROCEDURE SAFETY CHECKLIST    Date: 2025   Procedure: Right suprascapular nerve CSI   Patient Name: Aubrey Duncan  MRN: [id number]  YOB: 1955    Action: Complete sections as appropriate. Any discrepancy results in a HARD COPY until resolved.     PRE PROCEDURE:  Patient ID verified with 2 identifiers (name and  or MRN): Yes  Procedure and site verified with patient/designee (when able): Yes  Accurate consent documentation in medical record: Yes  H&P (or appropriate assessment) documented in medical record: Yes  H&P must be up to 20 days prior to procedure and updates within 24 hours of procedure as applicable: NA  Relevant diagnostic and radiology test results appropriately labeled and displayed as applicable: Yes  Procedure site(s) marked with provider initials: NA    TIMEOUT:  Time-Out performed immediately prior to starting procedure, including verbal and active participation of all team members addressing the following:Yes  * Correct patient identify  * Confirmed that the correct side and site are marked  * An accurate procedure consent form  * Agreement on the procedure to be done  * Correct patient position  * Relevant images and results are properly labeled and appropriately displayed  * The need to administer antibiotics or fluids for irrigation purposes during the procedure as applicable   * Safety precautions based on patient history or medication use    DURING PROCEDURE: Verification of correct person, site, and procedures any time the responsibility for care of the patient is transferred to another member of the care team.       Prior to injection, verified patient identity using patient's name and date of  birth.  Due to injection administration, patient instructed to remain in clinic for 15 minutes  afterwards, and to report any adverse reaction to me immediately.    Suprascapular nerve     Drug Amount Wasted:  Yes: 1 mg/ml   Vial/Syringe: Single dose vial  Expiration Date:  01/29       Edwina Sin, [facility]  July 9, 2025

## 2025-07-11 PROCEDURE — 36415 COLL VENOUS BLD VENIPUNCTURE: CPT | Performed by: INTERNAL MEDICINE

## 2025-07-11 PROCEDURE — 80197 ASSAY OF TACROLIMUS: CPT | Performed by: INTERNAL MEDICINE

## 2025-07-15 DIAGNOSIS — Z94.2 S/P LUNG TRANSPLANT (H): Chronic | ICD-10-CM

## 2025-07-15 DIAGNOSIS — T86.810 CHRONIC REJECTION OF ALLOGRAFT LUNG (H): ICD-10-CM

## 2025-07-15 RX ORDER — FLUTICASONE PROPIONATE AND SALMETEROL XINAFOATE 230; 21 UG/1; UG/1
2 AEROSOL, METERED RESPIRATORY (INHALATION) 2 TIMES DAILY
Qty: 12 G | Refills: 5 | Status: CANCELLED | OUTPATIENT
Start: 2025-07-15

## 2025-07-19 ENCOUNTER — HOSPITAL ENCOUNTER (OUTPATIENT)
Dept: MRI IMAGING | Facility: CLINIC | Age: 70
Discharge: HOME | End: 2025-07-19
Attending: INTERNAL MEDICINE | Admitting: INTERNAL MEDICINE
Payer: MEDICARE

## 2025-07-19 ENCOUNTER — LAB (OUTPATIENT)
Dept: LAB | Facility: CLINIC | Age: 70
End: 2025-07-19
Payer: MEDICARE

## 2025-07-19 DIAGNOSIS — K86.2 PANCREATIC CYST: ICD-10-CM

## 2025-07-19 PROCEDURE — 255N000002 HC RX 255 OP 636: Performed by: INTERNAL MEDICINE

## 2025-07-19 PROCEDURE — 74183 MRI ABD W/O CNTR FLWD CNTR: CPT

## 2025-07-19 PROCEDURE — A9585 GADOBUTROL INJECTION: HCPCS | Performed by: INTERNAL MEDICINE

## 2025-07-19 RX ORDER — GADOBUTROL 604.72 MG/ML
6 INJECTION INTRAVENOUS ONCE
Status: COMPLETED | OUTPATIENT
Start: 2025-07-19 | End: 2025-07-19

## 2025-07-19 RX ADMIN — GADOBUTROL 6 ML: 604.72 INJECTION INTRAVENOUS at 13:18

## 2025-07-20 LAB
TACROLIMUS BLD-MCNC: 11 UG/L (ref 5–15)
TME LAST DOSE: NORMAL H
TME LAST DOSE: NORMAL H

## 2025-07-21 DIAGNOSIS — Z94.2 S/P LUNG TRANSPLANT (H): ICD-10-CM

## 2025-07-21 RX ORDER — TACROLIMUS 0.5 MG/1
0.5 CAPSULE ORAL EVERY MORNING
Qty: 30 CAPSULE | Refills: 11 | Status: SHIPPED | OUTPATIENT
Start: 2025-07-21

## 2025-07-21 RX ORDER — TACROLIMUS 1 MG/1
2 CAPSULE ORAL 2 TIMES DAILY
Qty: 120 CAPSULE | Refills: 11 | Status: SHIPPED | OUTPATIENT
Start: 2025-07-21

## 2025-07-22 ENCOUNTER — OFFICE VISIT (OUTPATIENT)
Dept: DERMATOLOGY | Facility: CLINIC | Age: 70
End: 2025-07-22
Payer: MEDICARE

## 2025-07-22 DIAGNOSIS — C44.622 SQUAMOUS CELL CANCER OF SKIN OF RIGHT FOREARM: ICD-10-CM

## 2025-07-22 PROCEDURE — 17313 MOHS 1 STAGE T/A/L: CPT | Performed by: DERMATOLOGY

## 2025-07-22 PROCEDURE — 13121 CMPLX RPR S/A/L 2.6-7.5 CM: CPT | Performed by: DERMATOLOGY

## 2025-07-22 PROCEDURE — 17314 MOHS ADDL STAGE T/A/L: CPT | Performed by: DERMATOLOGY

## 2025-07-22 NOTE — PATIENT INSTRUCTIONS
Sutured Wound Care     Right forearm    [facility] [phone]              No strenuous activity for 48 hours. Resume moderate activity in 48 hours. No heavy exercising until you are seen for follow up in one week.     Take Tylenol as needed for discomfort.                         Do not drink alcoholic beverages for 48 hours.     Keep the pressure bandage in place for 24 hours. If the bandage becomes blood tinged or loose, reinforce it with gauze and tape.        (Refer to the reverse side of this page for management of bleeding).    Remove pressure bandage in 24 hours     Leave the flat bandage in place until your follow up appointment.    Keep the bandage dry. Wash around it carefully.    If the tape becomes soiled or starts to come off, reinforce it with additional paper tape.    Do not smoke for 3 weeks; smoking is detrimental to wound healing.    It is normal to have swelling and bruising around the surgical site. The bruising will fade in approximately 10-14 days. Elevate the area to reduce swelling.    Numbness, itchiness and sensitivity to temperature changes can occur after surgery and may take up to 18 months to normalize.      POSSIBLE COMPLICATIONS    BLEEDING:    Leave the bandage in place.  Use tightly rolled up gauze or a cloth to apply direct pressure over the bandage for 20   minutes.  Reapply pressure for an additional 20 minutes if necessary  Call the office or go to the nearest emergency room if pressure fails to stop the bleeding.  Use additional gauze and tape to maintain pressure once the bleeding has stopped.        PAIN:    Post operative pain should slowly get better, never worse.  A severe increase in pain may indicate a problem. Call the office if this occurs.    In case of emergency phone:Dr Urias [phone]       ONE WEEK AFTER SURGERY:  -Remove bandage  -Clean and dry the area with plain tap water using a Q-tip or sterile gauze  pad  -Reapply steri strips down incision and cover with paper tape  -Leave bandage in place for 1  week  -Remove bandage on 8/5/25    when you remove the bandage, you may resume your regular skin care routine, including washing with mild soap and water, applying moisturizer, make-up and sunscreen.    If there are any open or bleeding areas at the incision/graft site you should begin to cover the area with a bandage daily as follows:    Clean and dry the area with plain tap water using a Q-tip or sterile gauze pad.  Apply Polysporin or Bacitracin ointment to the open area.  Cover the wound with a band-aid or a sterile non-stick gauze pad and micropore paper tape.         SIGNS OF INFECTION  - If you notice any of these signs of infection, call your doctor right away: expanding redness around the wound.  - Yellow or greenish-colored pus or cloudy wound drainage.    - Red streaking spreading from the wound.  - Increased swelling, tenderness, or pain around the wound.   - Fever.    Please remember that yellow and clear drainage from a wound can be normal and related to normal wound healing.  Isolated drainage from a wound without a combination of the above features does not indicate infection.     *Once the bandages are removed, the scar will be red and firm (especially in the lip/chin area). This is normal and will fade in time. It might take 6-12 months for this to happen.     *Massaging the area will help the scar soften and fade quicker. Begin to massage the area one month after the bandages have been removed. To massage apply pressure directly and firmly over the scar with the fingertips and move in a circular motion. Massage the area for a few minutes several times a day. Continue to massage the site for several months.    *Approximately 6-8 weeks after surgery it is not uncommon to see the formation of  tender pimple-like  bump along the scar. This is normal. As the scar continues to mature and the stitches  underneath the skin begin to dissolve, this might occur. Do not pick or squeeze, this will resolve on it s own. Should one break open producing a small amount of drainage, apply Polysporin or Bacitracin ointment a few times a day until the wound is completely healed.    *Numbness in the surgical area is expected. It might take 12-18 months for the feeling to return to normal. During this time sensations of itchiness, tingling and occasional sharp pains might be noted. These feelings are normal and will subside once the nerves have completely healed.
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
## 2025-07-23 ENCOUNTER — PATIENT OUTREACH (OUTPATIENT)
Dept: GASTROENTEROLOGY | Facility: CLINIC | Age: 70
End: 2025-07-23

## 2025-07-23 ENCOUNTER — THERAPY VISIT (OUTPATIENT)
Dept: SPEECH THERAPY | Facility: CLINIC | Age: 70
End: 2025-07-23
Attending: STUDENT IN AN ORGANIZED HEALTH CARE EDUCATION/TRAINING PROGRAM
Payer: MEDICARE

## 2025-07-23 DIAGNOSIS — R13.10 DYSPHAGIA, UNSPECIFIED TYPE: ICD-10-CM
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
## 2025-07-24 NOTE — TELEPHONE ENCOUNTER
Called patient to discuss recommendation for EUS with Dr. Cortez    Please assist in scheduling:     Procedure/Imaging/Clinic: Endoscopic Ultrasound  Physician: Diego  Timin-15  Scope time needed:provider average  Fluoro/C-arm needed: no  Anesthesia:MAC  Dx: pancreatic cyst  Tier:Tier 3 -   Surgeries/procedures that can be delayed  between 30 to 90 days with no significant  morbidity/mortality to patient or impact on  patient/disease outcome.   Location: [facility]  OK to schedule while attending: bethany  Header of letter for pt communication:Endoscopic Ultrasound  PREOP: can use H&P from EGD with Dr Bowles on 25    Pt will hold Eliquis for 2 days prior to procedure, ok to continue ASA.    Orders placed, patient and wife know plan.    ML

## 2025-07-29 DIAGNOSIS — Z95.5 S/P CORONARY ARTERY STENT PLACEMENT: Primary | ICD-10-CM

## 2025-08-05 ENCOUNTER — LAB (OUTPATIENT)
Dept: LAB | Facility: CLINIC | Age: 70
End: 2025-08-05
Payer: MEDICARE

## 2025-08-05 DIAGNOSIS — D75.89 OTHER SPECIFIED DISEASES OF BLOOD AND BLOOD-FORMING ORGANS: ICD-10-CM

## 2025-08-05 DIAGNOSIS — D53.9 MACROCYTIC ANEMIA: ICD-10-CM

## 2025-08-05 PROCEDURE — G0452 MOLECULAR PATHOLOGY INTERPR: HCPCS | Mod: 26 | Performed by: PATHOLOGY

## 2025-08-05 PROCEDURE — 36415 COLL VENOUS BLD VENIPUNCTURE: CPT

## 2025-08-08 ENCOUNTER — TELEPHONE (OUTPATIENT)
Dept: GASTROENTEROLOGY | Facility: CLINIC | Age: 70
End: 2025-08-08
Payer: MEDICARE

## 2025-08-22 ENCOUNTER — EXTERNAL ORDER RESULTS (OUTPATIENT)
Dept: LAB | Facility: CLINIC | Age: 70
End: 2025-08-22
Payer: MEDICARE

## 2025-08-25 ENCOUNTER — ANESTHESIA EVENT (OUTPATIENT)
Dept: GASTROENTEROLOGY | Facility: CLINIC | Age: 70
End: 2025-08-25
Payer: MEDICARE

## 2025-08-25 DIAGNOSIS — Z94.2 S/P LUNG TRANSPLANT (H): ICD-10-CM

## 2025-08-25 LAB
% BASOPHILS (EXTERNAL): 0.9 %
% EOSINOPHILS (EXTERNAL): 2 %
% IMMATURE GRANULOCYTES (EXTERNAL): 1 %
% LYMPHOCYTES (EXTERNAL): 29.9 %
% MONOCYTES (EXTERNAL): 13 %
% NEUTROPHILS (EXTERNAL): 53.2 %
ABSOLUTE BASOPHILS (EXTERNAL): 0.1 10*9/L (ref 0–0.1)
ABSOLUTE EOSINOPHILS (EXTERNAL): 0.1 /L (ref 0–0.4)
ABSOLUTE IMMATURE GRANULOCYTES (EXTERNAL): 0.07 10*9/L (ref 0–0.06)
ABSOLUTE LYMPHOCYTES (EXTERNAL): 2.1 10*9/L (ref 0.8–3.3)
ABSOLUTE MONOCYTES (EXTERNAL): 0.9 /L (ref 0.2–0.9)
ABSOLUTE NEUTROPHILS (EXTERNAL): 3.7 /L (ref 1.5–7.6)

## 2025-08-25 RX ORDER — TACROLIMUS 1 MG/1
CAPSULE ORAL
Qty: 90 CAPSULE | Refills: 11 | Status: SHIPPED | OUTPATIENT
Start: 2025-08-25

## 2025-08-25 RX ORDER — TACROLIMUS 0.5 MG/1
0.5 CAPSULE ORAL EVERY EVENING
Qty: 30 CAPSULE | Refills: 11 | Status: SHIPPED | OUTPATIENT
Start: 2025-08-25

## 2025-08-25 ASSESSMENT — COPD QUESTIONNAIRES: COPD: 1

## 2025-08-26 ENCOUNTER — ANESTHESIA (OUTPATIENT)
Dept: GASTROENTEROLOGY | Facility: CLINIC | Age: 70
End: 2025-08-26
Payer: MEDICARE

## 2025-08-26 ENCOUNTER — HOSPITAL ENCOUNTER (OUTPATIENT)
Facility: CLINIC | Age: 70
Discharge: HOME | End: 2025-08-26
Attending: INTERNAL MEDICINE | Admitting: INTERNAL MEDICINE
Payer: MEDICARE

## 2025-08-26 VITALS
SYSTOLIC BLOOD PRESSURE: 186 MMHG | DIASTOLIC BLOOD PRESSURE: 90 MMHG | RESPIRATION RATE: 16 BRPM | TEMPERATURE: 97.9 F | HEART RATE: 87 BPM | OXYGEN SATURATION: 97 %

## 2025-08-26 LAB
GLUCOSE BLDC GLUCOMTR-MCNC: 97 MG/DL (ref 70–99)
UPPER GI ENDOSCOPY: NORMAL

## 2025-08-26 PROCEDURE — 43249 ESOPH EGD DILATION <30 MM: CPT | Performed by: INTERNAL MEDICINE

## 2025-08-26 PROCEDURE — 250N000011 HC RX IP 250 OP 636

## 2025-08-26 PROCEDURE — 258N000003 HC RX IP 258 OP 636

## 2025-08-26 PROCEDURE — 370N000017 HC ANESTHESIA TECHNICAL FEE, PER MIN: Performed by: INTERNAL MEDICINE

## 2025-08-26 PROCEDURE — 88305 TISSUE EXAM BY PATHOLOGIST: CPT | Mod: 26 | Performed by: PATHOLOGY

## 2025-08-26 PROCEDURE — 43239 EGD BIOPSY SINGLE/MULTIPLE: CPT | Performed by: INTERNAL MEDICINE

## 2025-08-26 PROCEDURE — 82962 GLUCOSE BLOOD TEST: CPT

## 2025-08-26 PROCEDURE — 250N000009 HC RX 250

## 2025-08-26 PROCEDURE — 88305 TISSUE EXAM BY PATHOLOGIST: CPT | Mod: TC | Performed by: INTERNAL MEDICINE

## 2025-08-26 RX ORDER — ONDANSETRON 2 MG/ML
4 INJECTION INTRAMUSCULAR; INTRAVENOUS
Status: DISCONTINUED | OUTPATIENT
Start: 2025-08-26 | End: 2025-08-26 | Stop reason: HOSPADM

## 2025-08-26 RX ORDER — ONDANSETRON 2 MG/ML
4 INJECTION INTRAMUSCULAR; INTRAVENOUS EVERY 30 MIN PRN
Status: DISCONTINUED | OUTPATIENT
Start: 2025-08-26 | End: 2025-08-26 | Stop reason: HOSPADM

## 2025-08-26 RX ORDER — PROPOFOL 10 MG/ML
INJECTION, EMULSION INTRAVENOUS CONTINUOUS PRN
Status: DISCONTINUED | OUTPATIENT
Start: 2025-08-26 | End: 2025-08-26

## 2025-08-26 RX ORDER — SODIUM CHLORIDE, SODIUM LACTATE, POTASSIUM CHLORIDE, CALCIUM CHLORIDE 600; 310; 30; 20 MG/100ML; MG/100ML; MG/100ML; MG/100ML
INJECTION, SOLUTION INTRAVENOUS CONTINUOUS
Status: DISCONTINUED | OUTPATIENT
Start: 2025-08-26 | End: 2025-08-26 | Stop reason: HOSPADM

## 2025-08-26 RX ORDER — LIDOCAINE HYDROCHLORIDE 20 MG/ML
INJECTION, SOLUTION INFILTRATION; PERINEURAL PRN
Status: DISCONTINUED | OUTPATIENT
Start: 2025-08-26 | End: 2025-08-26

## 2025-08-26 RX ORDER — ONDANSETRON 4 MG/1
4 TABLET, ORALLY DISINTEGRATING ORAL EVERY 30 MIN PRN
Status: DISCONTINUED | OUTPATIENT
Start: 2025-08-26 | End: 2025-08-26 | Stop reason: HOSPADM

## 2025-08-26 RX ORDER — FLUMAZENIL 0.1 MG/ML
0.2 INJECTION, SOLUTION INTRAVENOUS
Status: DISCONTINUED | OUTPATIENT
Start: 2025-08-26 | End: 2025-08-26 | Stop reason: HOSPADM

## 2025-08-26 RX ORDER — NALOXONE HYDROCHLORIDE 0.4 MG/ML
0.1 INJECTION, SOLUTION INTRAMUSCULAR; INTRAVENOUS; SUBCUTANEOUS
Status: DISCONTINUED | OUTPATIENT
Start: 2025-08-26 | End: 2025-08-26 | Stop reason: HOSPADM

## 2025-08-26 RX ORDER — ACETAMINOPHEN 325 MG/1
975 TABLET ORAL ONCE
Status: DISCONTINUED | OUTPATIENT
Start: 2025-08-26 | End: 2025-08-26 | Stop reason: HOSPADM

## 2025-08-26 RX ORDER — LABETALOL HYDROCHLORIDE 5 MG/ML
10 INJECTION, SOLUTION INTRAVENOUS
Status: DISCONTINUED | OUTPATIENT
Start: 2025-08-26 | End: 2025-08-26 | Stop reason: HOSPADM

## 2025-08-26 RX ORDER — NALOXONE HYDROCHLORIDE 0.4 MG/ML
0.4 INJECTION, SOLUTION INTRAMUSCULAR; INTRAVENOUS; SUBCUTANEOUS
Status: DISCONTINUED | OUTPATIENT
Start: 2025-08-26 | End: 2025-08-26 | Stop reason: HOSPADM

## 2025-08-26 RX ORDER — NALOXONE HYDROCHLORIDE 0.4 MG/ML
0.2 INJECTION, SOLUTION INTRAMUSCULAR; INTRAVENOUS; SUBCUTANEOUS
Status: DISCONTINUED | OUTPATIENT
Start: 2025-08-26 | End: 2025-08-26 | Stop reason: HOSPADM

## 2025-08-26 RX ORDER — ONDANSETRON 2 MG/ML
4 INJECTION INTRAMUSCULAR; INTRAVENOUS EVERY 6 HOURS PRN
Status: DISCONTINUED | OUTPATIENT
Start: 2025-08-26 | End: 2025-08-26 | Stop reason: HOSPADM

## 2025-08-26 RX ORDER — DEXAMETHASONE SODIUM PHOSPHATE 4 MG/ML
4 INJECTION, SOLUTION INTRA-ARTICULAR; INTRALESIONAL; INTRAMUSCULAR; INTRAVENOUS; SOFT TISSUE
Status: DISCONTINUED | OUTPATIENT
Start: 2025-08-26 | End: 2025-08-26 | Stop reason: HOSPADM

## 2025-08-26 RX ORDER — LIDOCAINE 40 MG/G
CREAM TOPICAL
Status: DISCONTINUED | OUTPATIENT
Start: 2025-08-26 | End: 2025-08-26 | Stop reason: HOSPADM

## 2025-08-26 RX ORDER — ONDANSETRON 2 MG/ML
INJECTION INTRAMUSCULAR; INTRAVENOUS PRN
Status: DISCONTINUED | OUTPATIENT
Start: 2025-08-26 | End: 2025-08-26

## 2025-08-26 RX ORDER — SODIUM CHLORIDE, SODIUM LACTATE, POTASSIUM CHLORIDE, CALCIUM CHLORIDE 600; 310; 30; 20 MG/100ML; MG/100ML; MG/100ML; MG/100ML
INJECTION, SOLUTION INTRAVENOUS CONTINUOUS
Status: DISCONTINUED | OUTPATIENT
Start: 2025-08-26 | End: 2025-08-26

## 2025-08-26 RX ORDER — PROPOFOL 10 MG/ML
INJECTION, EMULSION INTRAVENOUS PRN
Status: DISCONTINUED | OUTPATIENT
Start: 2025-08-26 | End: 2025-08-26

## 2025-08-26 RX ORDER — HYDRALAZINE HYDROCHLORIDE 20 MG/ML
2.5-5 INJECTION INTRAMUSCULAR; INTRAVENOUS EVERY 10 MIN PRN
Status: DISCONTINUED | OUTPATIENT
Start: 2025-08-26 | End: 2025-08-26 | Stop reason: HOSPADM

## 2025-08-26 RX ORDER — SODIUM CHLORIDE, SODIUM LACTATE, POTASSIUM CHLORIDE, CALCIUM CHLORIDE 600; 310; 30; 20 MG/100ML; MG/100ML; MG/100ML; MG/100ML
INJECTION, SOLUTION INTRAVENOUS CONTINUOUS PRN
Status: DISCONTINUED | OUTPATIENT
Start: 2025-08-26 | End: 2025-08-26

## 2025-08-26 RX ORDER — ACETAMINOPHEN 325 MG/1
975 TABLET ORAL ONCE
Status: DISCONTINUED | OUTPATIENT
Start: 2025-08-26 | End: 2025-08-26

## 2025-08-26 RX ORDER — ONDANSETRON 4 MG/1
4 TABLET, ORALLY DISINTEGRATING ORAL EVERY 6 HOURS PRN
Status: DISCONTINUED | OUTPATIENT
Start: 2025-08-26 | End: 2025-08-26 | Stop reason: HOSPADM

## 2025-08-26 RX ORDER — PROCHLORPERAZINE MALEATE 5 MG/1
5 TABLET ORAL EVERY 6 HOURS PRN
Status: DISCONTINUED | OUTPATIENT
Start: 2025-08-26 | End: 2025-08-26 | Stop reason: HOSPADM

## 2025-08-26 RX ADMIN — PROPOFOL 10 MG: 10 INJECTION, EMULSION INTRAVENOUS at 09:19

## 2025-08-26 RX ADMIN — PROPOFOL 100 MCG/KG/MIN: 10 INJECTION, EMULSION INTRAVENOUS at 09:09

## 2025-08-26 RX ADMIN — LIDOCAINE HYDROCHLORIDE 80 MG: 20 INJECTION, SOLUTION INFILTRATION; PERINEURAL at 09:09

## 2025-08-26 RX ADMIN — SODIUM CHLORIDE, SODIUM LACTATE, POTASSIUM CHLORIDE, AND CALCIUM CHLORIDE: .6; .31; .03; .02 INJECTION, SOLUTION INTRAVENOUS at 09:09

## 2025-08-26 RX ADMIN — PROPOFOL 10 MG: 10 INJECTION, EMULSION INTRAVENOUS at 09:21

## 2025-08-26 RX ADMIN — PROPOFOL 30 MG: 10 INJECTION, EMULSION INTRAVENOUS at 09:11

## 2025-08-26 RX ADMIN — ONDANSETRON 4 MG: 2 INJECTION INTRAMUSCULAR; INTRAVENOUS at 09:12

## 2025-08-26 ASSESSMENT — ACTIVITIES OF DAILY LIVING (ADL)
ADLS_ACUITY_SCORE: 58

## 2025-08-29 DIAGNOSIS — K21.9 GASTROESOPHAGEAL REFLUX DISEASE, UNSPECIFIED WHETHER ESOPHAGITIS PRESENT: ICD-10-CM

## 2025-09-02 RX ORDER — PANTOPRAZOLE SODIUM 40 MG/1
40 TABLET, DELAYED RELEASE ORAL DAILY
Qty: 90 TABLET | Refills: 3 | Status: SHIPPED | OUTPATIENT
Start: 2025-09-02

## (undated) DEVICE — CATH TRAY FOLEY 16FR W/METER A800365

## (undated) DEVICE — SOL WATER IRRIG 1000ML BOTTLE 2F7114

## (undated) DEVICE — CATH ANGIO INFINITI 3DRC 4FRX100CM 538476

## (undated) DEVICE — GOWN IMPERVIOUS BREATHABLE SMART LG 89015

## (undated) DEVICE — BITE BLOCK BLOX ADJ STRAP 60FR SBT-546-100

## (undated) DEVICE — GLOVE BIOGEL PI ULTRATOUCH G SZ 7.5 42175

## (undated) DEVICE — DRSG ABD TNDRSRB WET PRUF 8IN X 10IN STRL  9194A

## (undated) DEVICE — SUCTION MANIFOLD NEPTUNE 2 SYS 4 PORT 0702-020-000

## (undated) DEVICE — STPL SKIN 35W 6.9MM  PXW35

## (undated) DEVICE — SUTURE VICRYL+ 3-0 18 SH/CR UND VCP864

## (undated) DEVICE — SUCTION MANIFOLD NEPTUNE 2 SYS 1 PORT 702-025-000

## (undated) DEVICE — CUSTOM PACK TOTAL KNEE SOP5BTKHEC

## (undated) DEVICE — LINEN TOWEL PACK X5 5464

## (undated) DEVICE — SUCTION MANIFOLD DORNOCH ULTRA CART UL-CL500

## (undated) DEVICE — SOL NACL 0.9% IRRIG 3000ML BAG 2B7477

## (undated) DEVICE — SUTURE VICRYL+ 2-0 27IN CT-1 UND VCP259H

## (undated) DEVICE — LASER FIBER HOLMIUM FLEXIVA 200UM M0068403910 840-391

## (undated) DEVICE — DRAPE SHEET MED 44X70" 9355

## (undated) DEVICE — TAPE UMBILICAL COTTON 30X1/16IN 2 STRAND 8619-03A 8886861903

## (undated) DEVICE — KIT HAND CONTROL ACIST 016795

## (undated) DEVICE — CLIP HORIZON MULTI SM YELLOW 001204

## (undated) DEVICE — INTRO SHEATH 6FRX25CM PINNACLE RSS606

## (undated) DEVICE — SU SILK 0 24" TIE SA76G

## (undated) DEVICE — VESSEL LOOP BLUE MINI 30-713

## (undated) DEVICE — SHEATH URETERAL ACCESS NAVIGATOR HD 12/14FRX36CM M0062502250

## (undated) DEVICE — SPECIMEN CONTAINER 3OZ W/FORMALIN 59901

## (undated) DEVICE — Device

## (undated) DEVICE — SU ETHILON 3-0 FS-1 18" 669H

## (undated) DEVICE — TUBING SUCTION 10'X3/16" N510

## (undated) DEVICE — SOL NACL 0.9% INJ 1000ML BAG 2B1324X

## (undated) DEVICE — SU VICRYL+ 3-0 27IN SH UND VCP416H

## (undated) DEVICE — SOL NACL 0.9% IRRIG 1000ML BOTTLE 2F7124

## (undated) DEVICE — DRESSING XEROFORM PETROLATUM 5X9 33605

## (undated) DEVICE — DRSG TEGADERM 2 3/8X2 3/4" 1624W

## (undated) DEVICE — SOL WATER IRRIG 1000ML BOTTLE 07139-09

## (undated) DEVICE — SU PROLENE 7-0 BV-1DA 4X18" M8701

## (undated) DEVICE — DRAPE C-ARM W/STRAPS 42X72" 07-CA104

## (undated) DEVICE — SU SILK 3-0 SH CR 8X18" C013D

## (undated) DEVICE — TUBING IRRIG TUR Y TYPE 96" LF 6543-01

## (undated) DEVICE — ENDO NDL BALL TIP ULTRASOUND 22GA 5.2FR ECHO-3-22

## (undated) DEVICE — GLIDEWIRE TERUMO .035X180 ANG STIFF GS3508

## (undated) DEVICE — ENDO BRUSH CHANNEL MASTER CLEANING 2-4.2MM BW-412T

## (undated) DEVICE — PITCHER STERILE 1000ML  SSK9004A

## (undated) DEVICE — VALVE HEMOSTASIS GUARDIAN II OD8 FR GUIDEWIRE 8215

## (undated) DEVICE — DEVICE RETRIEVAL ROTH NET 3.89MMX160CM 00711155

## (undated) DEVICE — CATH US OD 5FR OD SEC 2.9FR EAGLE EYE PLATINUM 0.014 85900P

## (undated) DEVICE — SU PROLENE 6-0 C-1DA 30" M8706

## (undated) DEVICE — LIGACLIP MEDIUM AESCULAP B2180-1

## (undated) DEVICE — CLIP HORIZON MULTI MED BLUE 002204

## (undated) DEVICE — DRAPE CV SPLIT 110X36" 89452

## (undated) DEVICE — BONE WAX 2.5GM W31G

## (undated) DEVICE — DRAPE ISOLATION BAG 1003

## (undated) DEVICE — ADAPTER CATH 403250

## (undated) DEVICE — SU SILK 2-0 SH CR 8X18" C012D

## (undated) DEVICE — BLADE CLIPPER DISP 4406

## (undated) DEVICE — INTRO SHEATH MICRO PLATINUM TIP 4FRX40CM 7274

## (undated) DEVICE — CATH URETERAL OPEN END 05FR 70CM 020015

## (undated) DEVICE — SUTURE PROLENE 5-0 RB-2 8555H

## (undated) DEVICE — DRAPE C-ARM 60X42" 1013

## (undated) DEVICE — CLIP SPRING FOGARTY SOFTJAW CSOFT6

## (undated) DEVICE — TRAY PREP DRY SKIN SCRUB 067

## (undated) DEVICE — SOL WATER 1500ML

## (undated) DEVICE — DRAPE IOBAN INCISE 23X17" 6650EZ

## (undated) DEVICE — ENDO SNARE EXACTO COLD 9MM LOOP 2.4MMX230CM 00711115

## (undated) DEVICE — DECANTER BAG 2002S

## (undated) DEVICE — TOWEL SURG 16INW X 26INL WHITE STRL XRAY DETECTABLE X8314W

## (undated) DEVICE — SU VICRYL+ 0 8-18 CT1/CR UND VCP840D

## (undated) DEVICE — SU PROLENE 6-0 C-1DA 18" M8718

## (undated) DEVICE — GLOVE PROTEXIS W/NEU-THERA 7.5  2D73TE75

## (undated) DEVICE — FASTENER CATH BALLOON CLAMPX2 STATLOCK 0684-00-493

## (undated) DEVICE — SUTURE SILK 3-0 TIES 30IN SA84H

## (undated) DEVICE — SU SILK 4-0 TIE 18' A183H

## (undated) DEVICE — PACK HEART LEFT CUSTOM

## (undated) DEVICE — PAD CHUX UNDERPAD 23X24" 7136

## (undated) DEVICE — CATH ANGIO INFINITI JL4 4FRX100CM 538420

## (undated) DEVICE — ENDO TUBING CO2 SMARTCAP STERILE DISP 100145CO2EXT

## (undated) DEVICE — CATH URETERAL OPEN END TIP 05FR 70CM 134105LT / 134105RT

## (undated) DEVICE — SPONGE LAP 18X18" X8435

## (undated) DEVICE — GOWN IMPERVIOUS SPECIALTY XLG/XLONG 32474

## (undated) DEVICE — TUBING MEDRAD 48" HIGH PRESSURE MX694

## (undated) DEVICE — PAD CHUX UNDERPAD 30X30"

## (undated) DEVICE — CATH BALLOON EMERGE 3.0X12MM H7493918912300

## (undated) DEVICE — GEL ULTRASOUND AQUASONIC 20GM 01-01

## (undated) DEVICE — DRSG GAUZE 4X4" TRAY 6939

## (undated) DEVICE — CATH BALLOON NC EMERGE 4.00X15MM H7493926715400

## (undated) DEVICE — SPECIMEN CONTAINER 5OZ STERILE 2600SA

## (undated) DEVICE — GLOVE PROTEXIS W/NEU-THERA 6.5  2D73TE65

## (undated) DEVICE — MANIFOLD KIT ANGIO AUTOMATED 014613

## (undated) DEVICE — VALVE HEMOSTASIS .096" COPILOT MECH 1003331

## (undated) DEVICE — DRAPE IOBAN INCISE 36X23" 6651EZ

## (undated) DEVICE — CATH GUIDING COR LNCHR OD6FR L100CM

## (undated) DEVICE — DRSG KERLIX FLUFFS X5

## (undated) DEVICE — LINEN GOWN X4 5410

## (undated) DEVICE — BLADE SAGITTAL WIDE (SO-618) 2108-118

## (undated) DEVICE — LINEN TOWEL PACK X6 WHITE 5487

## (undated) DEVICE — INTRO SHEATH AVANTI 4FRX23CM 504604T

## (undated) DEVICE — GLOVE SURG GAMMEX PI POLYISOPRENE WHITE SZ 8 LF 20685780

## (undated) DEVICE — CATH GUIDING BLUE YELLOW PTFE XB3.5 6FRX100CM 67005400

## (undated) DEVICE — BASKET STONE EXTRACTOR NITINOL NCIRCLE 1.5FRX115CM G46206

## (undated) DEVICE — SU SILK 2-0 TIE 18' A185H

## (undated) DEVICE — TUBING EXTENSION SET 20" B1327

## (undated) DEVICE — GOWN SURGICAL SMARTGOWN 2XL 89075

## (undated) DEVICE — BLADE SAW SAGITTAL STRK WIDE 25.4X85X1.2MM 2108-151-000

## (undated) DEVICE — ESU GROUND PAD ADULT REM W/15' CORD E7507DB

## (undated) DEVICE — CATH BALLOON EMERGE 3.0X15MM H7493918915300

## (undated) DEVICE — PREP CHLORAPREP 26ML TINTED HI-LITE ORANGE 930815

## (undated) DEVICE — GUIDEWIRE SENSOR DUAL FLEX STR 0.035"X150CM M0066703080

## (undated) DEVICE — ADAPTER SCOPE UROLOK II LF M0067301400

## (undated) DEVICE — GUIDEWIRE VASC 0.014INX180CM RUNTHROUGH 25-1011

## (undated) DEVICE — BANDAGE ESMARK 4 X 3 YARDS STL 23578-143

## (undated) DEVICE — ENDO PROBE COVER ULTRASOUND BALLOON LATEX  MAJ-249

## (undated) DEVICE — ESU ELEC BLADE 2.75" COATED/INSULATED E1455

## (undated) DEVICE — DRAPE STERI TOWEL LG 1010

## (undated) DEVICE — CLOSURE DEVICE 6FR VASC PROGLIDE MEDICATED SUTURE 12673-03

## (undated) DEVICE — ENDO KIT COMPLIANCE DYKENDOCMPLY

## (undated) DEVICE — ENDO NDL BEVEL TIP ULTRASOUND 22GA 5.2FR ECHO-1-22

## (undated) DEVICE — LOOP RETRACT-O-TAPE 16GA X 18 QUEST 1012

## (undated) DEVICE — CATH GUIDING BLUE YELLOW PTFE JR4 6FRX100CM 67008200

## (undated) DEVICE — MITT PRE-OP 7 L X 5 1/2 W 5177M1

## (undated) DEVICE — ESU PENCIL SMOKE EVAC W/ROCKER SWITCH 0703-047-000

## (undated) DEVICE — SUTURE SILK 2-0 TIES 30IN SA85H

## (undated) DEVICE — WIPE MICRO-TIP GRAPHIC CONTROL 3300

## (undated) DEVICE — KIT HAND CONTROL ACIST 014644 AR-P54

## (undated) DEVICE — SU SILK 3-0 SH 30" K832H

## (undated) DEVICE — SU MONOCRYL+ 4-0 18IN PS2 UND MCP496G

## (undated) DEVICE — GLOVE BIOGEL INDICATOR 7.5 LF 41675

## (undated) DEVICE — KIT ENDO FIRST STEP DISINFECTANT 200ML W/POUCH EP-4

## (undated) DEVICE — CATH ANGIO INFINITI JR4 4FRX100CM 538421

## (undated) DEVICE — SYR 30ML LL W/O NDL 302832

## (undated) DEVICE — CATH URETERAL DL 6FR FLEX-TIP 10FRX50CM G17323 AQ-022610

## (undated) DEVICE — GLOVE PROTEXIS BLUE W/NEU-THERA 6.5  2D73EB65

## (undated) DEVICE — DRAPE U SPLIT 74X120" 29440

## (undated) DEVICE — INTRO SHEATH BRITE TIP 5FRX11CM 401-511M

## (undated) DEVICE — ADH LIQUID MASTISOL TOPICAL VIAL 2-3ML 0523-48

## (undated) DEVICE — DRSG KERLIX 4 1/2"X4YDS ROLL 6715

## (undated) DEVICE — INFLATION DEVICE ENCORE  26 PRESSURE GAUGE M001151050

## (undated) DEVICE — DRSG PRIMAPORE 04X11 3/4"

## (undated) DEVICE — SHTH INTRO 0.021IN ID 6FR DIA

## (undated) DEVICE — PREP POVIDONE-IODINE 10% SOLUTION 4OZ BOTTLE MDS093944

## (undated) DEVICE — BLADE CLIPPER SGL USE 9680

## (undated) DEVICE — CATH OMNIFLUSH 5FRX100CM SIZING 13709703

## (undated) DEVICE — GLOVE SURG PI ULTRA TOUCH M SZ 7-1/2 LF

## (undated) DEVICE — CUFF TOURN 30IN STRL DISP 5921030235

## (undated) DEVICE — STRAP KNEE/BODY 31143004

## (undated) DEVICE — JELLY LUBRICATING SURGILUBE 2OZ TUBE

## (undated) DEVICE — CATH BALLOON EMERGE 2.5X12MM H7493918912250

## (undated) DEVICE — SU DERMABOND ADVANCED .7ML DNX12

## (undated) DEVICE — CUSTOM PACK PERIPH VASCULAR SCV5BPVHEA

## (undated) DEVICE — MARKER SURG SKIN 2 TIP STRL SPP99DT2AA

## (undated) DEVICE — SUCTION WATERBUG FLOOR PUDDLE VAC 9321

## (undated) DEVICE — ENDO TRAP POLYP E-TRAP 00711099

## (undated) DEVICE — SYR 50ML LL W/O NDL 309653

## (undated) DEVICE — PACK CYSTO UMMC CUSTOM

## (undated) DEVICE — TUBING PRESSURE 30"

## (undated) DEVICE — PREP POVIDONE-IODINE 7.5% SCRUB 4OZ BOTTLE MDS093945

## (undated) DEVICE — CATH BALLOON NC EMERGE 3.50X20MM H7493926720350

## (undated) DEVICE — BLADE KNIFE SURG 15 371115

## (undated) DEVICE — RAD RX CONRAY 60% (50ML) 095305 CHARGE PER ML

## (undated) DEVICE — BASIN EMESIS STERILE  SSK9005A

## (undated) DEVICE — GUIDEWIRE OPTOWIRE 3 W/O GAUGE FACTOR CONNECTOR F1032

## (undated) DEVICE — RX SURGIFLO HEMOSTATIC MATRIX W/THROMBIN 8ML 2994

## (undated) DEVICE — PACK ENDOSCOPY GI CUSTOM UMMC

## (undated) DEVICE — GLOVE SURG PI ULTRA TOUCH M SZ 8-1/2 LF

## (undated) RX ORDER — LIDOCAINE HYDROCHLORIDE 20 MG/ML
INJECTION, SOLUTION EPIDURAL; INFILTRATION; INTRACAUDAL; PERINEURAL
Status: DISPENSED
Start: 2017-10-18

## (undated) RX ORDER — PROPOFOL 10 MG/ML
INJECTION, EMULSION INTRAVENOUS
Status: DISPENSED
Start: 2018-08-17

## (undated) RX ORDER — ONDANSETRON 2 MG/ML
INJECTION INTRAMUSCULAR; INTRAVENOUS
Status: DISPENSED
Start: 2024-09-21

## (undated) RX ORDER — FENTANYL CITRATE 50 UG/ML
INJECTION, SOLUTION INTRAMUSCULAR; INTRAVENOUS
Status: DISPENSED
Start: 2017-10-18

## (undated) RX ORDER — AZITHROMYCIN 250 MG/1
TABLET, FILM COATED ORAL
Status: DISPENSED
Start: 2024-01-10

## (undated) RX ORDER — EPHEDRINE SULFATE 50 MG/ML
INJECTION, SOLUTION INTRAMUSCULAR; INTRAVENOUS; SUBCUTANEOUS
Status: DISPENSED
Start: 2017-10-18

## (undated) RX ORDER — ONDANSETRON 2 MG/ML
INJECTION INTRAMUSCULAR; INTRAVENOUS
Status: DISPENSED
Start: 2017-10-18

## (undated) RX ORDER — CEFTRIAXONE SODIUM 1 G
VIAL (EA) INJECTION
Status: DISPENSED
Start: 2020-09-11

## (undated) RX ORDER — FENTANYL CITRATE-0.9 % NACL/PF 10 MCG/ML
PLASTIC BAG, INJECTION (ML) INTRAVENOUS
Status: DISPENSED
Start: 2024-08-28

## (undated) RX ORDER — FENTANYL CITRATE 50 UG/ML
INJECTION, SOLUTION INTRAMUSCULAR; INTRAVENOUS
Status: DISPENSED
Start: 2024-09-20

## (undated) RX ORDER — PROPOFOL 10 MG/ML
INJECTION, EMULSION INTRAVENOUS
Status: DISPENSED
Start: 2024-06-05

## (undated) RX ORDER — LIDOCAINE HYDROCHLORIDE 10 MG/ML
INJECTION, SOLUTION INFILTRATION; PERINEURAL
Status: DISPENSED
Start: 2024-09-22

## (undated) RX ORDER — FENTANYL CITRATE 50 UG/ML
INJECTION, SOLUTION INTRAMUSCULAR; INTRAVENOUS
Status: DISPENSED
Start: 2022-08-16

## (undated) RX ORDER — LIDOCAINE HYDROCHLORIDE 10 MG/ML
INJECTION, SOLUTION EPIDURAL; INFILTRATION; INTRACAUDAL; PERINEURAL
Status: DISPENSED
Start: 2024-01-11

## (undated) RX ORDER — FENTANYL CITRATE 50 UG/ML
INJECTION, SOLUTION INTRAMUSCULAR; INTRAVENOUS
Status: DISPENSED
Start: 2024-09-25

## (undated) RX ORDER — FENTANYL CITRATE 50 UG/ML
INJECTION, SOLUTION INTRAMUSCULAR; INTRAVENOUS
Status: DISPENSED
Start: 2017-11-09

## (undated) RX ORDER — BIVALIRUDIN 250 MG/5ML
INJECTION, POWDER, LYOPHILIZED, FOR SOLUTION INTRAVENOUS
Status: DISPENSED
Start: 2024-01-11

## (undated) RX ORDER — EPHEDRINE SULFATE 50 MG/ML
INJECTION, SOLUTION INTRAMUSCULAR; INTRAVENOUS; SUBCUTANEOUS
Status: DISPENSED
Start: 2024-08-28

## (undated) RX ORDER — CEFAZOLIN SODIUM/WATER 2 G/20 ML
SYRINGE (ML) INTRAVENOUS
Status: DISPENSED
Start: 2024-09-20

## (undated) RX ORDER — LIDOCAINE HYDROCHLORIDE 20 MG/ML
INJECTION, SOLUTION EPIDURAL; INFILTRATION; INTRACAUDAL; PERINEURAL
Status: DISPENSED
Start: 2018-08-17

## (undated) RX ORDER — PROPOFOL 10 MG/ML
INJECTION, EMULSION INTRAVENOUS
Status: DISPENSED
Start: 2023-09-28

## (undated) RX ORDER — PROPOFOL 10 MG/ML
INJECTION, EMULSION INTRAVENOUS
Status: DISPENSED
Start: 2024-08-28

## (undated) RX ORDER — HEPARIN SODIUM 1000 [USP'U]/ML
INJECTION, SOLUTION INTRAVENOUS; SUBCUTANEOUS
Status: DISPENSED
Start: 2022-08-16

## (undated) RX ORDER — PROPOFOL 10 MG/ML
INJECTION, EMULSION INTRAVENOUS
Status: DISPENSED
Start: 2017-10-18

## (undated) RX ORDER — LIDOCAINE HYDROCHLORIDE 10 MG/ML
INJECTION, SOLUTION EPIDURAL; INFILTRATION; INTRACAUDAL; PERINEURAL
Status: DISPENSED
Start: 2020-08-20

## (undated) RX ORDER — ACETAMINOPHEN 325 MG/1
TABLET ORAL
Status: DISPENSED
Start: 2022-08-16

## (undated) RX ORDER — SODIUM CHLORIDE 9 MG/ML
INJECTION, SOLUTION INTRAVENOUS
Status: DISPENSED
Start: 2019-02-24

## (undated) RX ORDER — BUDESONIDE 0.5 MG/2ML
INHALANT ORAL
Status: DISPENSED
Start: 2024-01-10

## (undated) RX ORDER — CEFAZOLIN SODIUM/WATER 2 G/20 ML
SYRINGE (ML) INTRAVENOUS
Status: DISPENSED
Start: 2022-08-16

## (undated) RX ORDER — FENTANYL CITRATE 50 UG/ML
INJECTION, SOLUTION INTRAMUSCULAR; INTRAVENOUS
Status: DISPENSED
Start: 2024-08-01

## (undated) RX ORDER — PANTOPRAZOLE SODIUM 40 MG/1
TABLET, DELAYED RELEASE ORAL
Status: DISPENSED
Start: 2024-01-10

## (undated) RX ORDER — ACETAMINOPHEN 325 MG/1
TABLET ORAL
Status: DISPENSED
Start: 2024-07-09

## (undated) RX ORDER — ONDANSETRON 2 MG/ML
INJECTION INTRAMUSCULAR; INTRAVENOUS
Status: DISPENSED
Start: 2024-09-20

## (undated) RX ORDER — METHYLPREDNISOLONE SODIUM SUCCINATE 40 MG/ML
INJECTION, POWDER, LYOPHILIZED, FOR SOLUTION INTRAMUSCULAR; INTRAVENOUS
Status: DISPENSED
Start: 2019-02-24

## (undated) RX ORDER — VASOPRESSIN 20 U/ML
INJECTION PARENTERAL
Status: DISPENSED
Start: 2024-08-28

## (undated) RX ORDER — LIDOCAINE HYDROCHLORIDE 10 MG/ML
INJECTION, SOLUTION INFILTRATION; PERINEURAL
Status: DISPENSED
Start: 2024-09-21

## (undated) RX ORDER — CEFTRIAXONE SODIUM 1 G
VIAL (EA) INJECTION
Status: DISPENSED
Start: 2020-08-20

## (undated) RX ORDER — LIDOCAINE HYDROCHLORIDE 10 MG/ML
INJECTION, SOLUTION INFILTRATION; PERINEURAL
Status: DISPENSED
Start: 2024-09-20

## (undated) RX ORDER — CLOPIDOGREL BISULFATE 75 MG/1
TABLET ORAL
Status: DISPENSED
Start: 2024-02-16

## (undated) RX ORDER — PANTOPRAZOLE SODIUM 40 MG/10ML
INJECTION, POWDER, LYOPHILIZED, FOR SOLUTION INTRAVENOUS
Status: DISPENSED
Start: 2024-03-21

## (undated) RX ORDER — ASPIRIN 325 MG
TABLET ORAL
Status: DISPENSED
Start: 2024-01-09

## (undated) RX ORDER — ATROPINE SULFATE 0.1 MG/ML
INJECTION INTRAVENOUS
Status: DISPENSED
Start: 2024-02-16

## (undated) RX ORDER — FENTANYL CITRATE 50 UG/ML
INJECTION, SOLUTION INTRAMUSCULAR; INTRAVENOUS
Status: DISPENSED
Start: 2024-02-16

## (undated) RX ORDER — BIVALIRUDIN 250 MG/5ML
INJECTION, POWDER, LYOPHILIZED, FOR SOLUTION INTRAVENOUS
Status: DISPENSED
Start: 2024-02-16

## (undated) RX ORDER — LIDOCAINE HYDROCHLORIDE 10 MG/ML
INJECTION, SOLUTION EPIDURAL; INFILTRATION; INTRACAUDAL; PERINEURAL
Status: DISPENSED
Start: 2020-07-02

## (undated) RX ORDER — ONDANSETRON 2 MG/ML
INJECTION INTRAMUSCULAR; INTRAVENOUS
Status: DISPENSED
Start: 2024-06-05

## (undated) RX ORDER — SODIUM CHLORIDE 9 MG/ML
INJECTION, SOLUTION INTRAVENOUS
Status: DISPENSED
Start: 2024-02-16

## (undated) RX ORDER — ACETAMINOPHEN 325 MG/1
TABLET ORAL
Status: DISPENSED
Start: 2019-02-24

## (undated) RX ORDER — LIDOCAINE HYDROCHLORIDE 10 MG/ML
INJECTION, SOLUTION EPIDURAL; INFILTRATION; INTRACAUDAL; PERINEURAL
Status: DISPENSED
Start: 2025-07-09

## (undated) RX ORDER — HEPARIN SODIUM 1000 [USP'U]/ML
INJECTION, SOLUTION INTRAVENOUS; SUBCUTANEOUS
Status: DISPENSED
Start: 2024-08-01

## (undated) RX ORDER — PROPOFOL 10 MG/ML
INJECTION, EMULSION INTRAVENOUS
Status: DISPENSED
Start: 2024-08-01

## (undated) RX ORDER — FENTANYL CITRATE 50 UG/ML
INJECTION, SOLUTION INTRAMUSCULAR; INTRAVENOUS
Status: DISPENSED
Start: 2024-11-01

## (undated) RX ORDER — ONDANSETRON 4 MG/1
TABLET, ORALLY DISINTEGRATING ORAL
Status: DISPENSED
Start: 2020-07-02

## (undated) RX ORDER — AMLODIPINE BESYLATE 5 MG/1
TABLET ORAL
Status: DISPENSED
Start: 2024-01-10

## (undated) RX ORDER — TRIAMCINOLONE ACETONIDE 40 MG/ML
INJECTION, SUSPENSION INTRA-ARTICULAR; INTRAMUSCULAR
Status: DISPENSED
Start: 2025-07-09

## (undated) RX ORDER — LIDOCAINE HYDROCHLORIDE 10 MG/ML
INJECTION, SOLUTION EPIDURAL; INFILTRATION; INTRACAUDAL; PERINEURAL
Status: DISPENSED
Start: 2022-08-16

## (undated) RX ORDER — AZITHROMYCIN 500 MG/5ML
INJECTION, POWDER, LYOPHILIZED, FOR SOLUTION INTRAVENOUS
Status: DISPENSED
Start: 2019-02-24

## (undated) RX ORDER — ONDANSETRON 2 MG/ML
INJECTION INTRAMUSCULAR; INTRAVENOUS
Status: DISPENSED
Start: 2017-11-09

## (undated) RX ORDER — FENTANYL CITRATE 50 UG/ML
INJECTION, SOLUTION INTRAMUSCULAR; INTRAVENOUS
Status: DISPENSED
Start: 2024-09-21

## (undated) RX ORDER — PHENYLEPHRINE HYDROCHLORIDE 10 MG/ML
INJECTION INTRAVENOUS
Status: DISPENSED
Start: 2024-08-28

## (undated) RX ORDER — CEFTRIAXONE SODIUM 1 G
VIAL (EA) INJECTION
Status: DISPENSED
Start: 2020-07-02

## (undated) RX ORDER — DEXAMETHASONE SODIUM PHOSPHATE 4 MG/ML
INJECTION, SOLUTION INTRA-ARTICULAR; INTRALESIONAL; INTRAMUSCULAR; INTRAVENOUS; SOFT TISSUE
Status: DISPENSED
Start: 2017-11-09

## (undated) RX ORDER — CEFAZOLIN SODIUM 2 G/100ML
INJECTION, SOLUTION INTRAVENOUS
Status: DISPENSED
Start: 2017-10-18

## (undated) RX ORDER — LIDOCAINE HYDROCHLORIDE 10 MG/ML
INJECTION, SOLUTION EPIDURAL; INFILTRATION; INTRACAUDAL; PERINEURAL
Status: DISPENSED
Start: 2017-10-05

## (undated) RX ORDER — NICARDIPINE HCL-0.9% SOD CHLOR 1 MG/10 ML
SYRINGE (ML) INTRAVENOUS
Status: DISPENSED
Start: 2024-02-16

## (undated) RX ORDER — LIDOCAINE HYDROCHLORIDE 10 MG/ML
INJECTION, SOLUTION EPIDURAL; INFILTRATION; INTRACAUDAL; PERINEURAL
Status: DISPENSED
Start: 2024-11-01

## (undated) RX ORDER — LIDOCAINE HYDROCHLORIDE 10 MG/ML
INJECTION, SOLUTION INFILTRATION; PERINEURAL
Status: DISPENSED
Start: 2024-07-09

## (undated) RX ORDER — PROPOFOL 10 MG/ML
INJECTION, EMULSION INTRAVENOUS
Status: DISPENSED
Start: 2022-08-16

## (undated) RX ORDER — CIPROFLOXACIN 2 MG/ML
INJECTION, SOLUTION INTRAVENOUS
Status: DISPENSED
Start: 2023-07-10

## (undated) RX ORDER — NICARDIPINE HCL-0.9% SOD CHLOR 1 MG/10 ML
SYRINGE (ML) INTRAVENOUS
Status: DISPENSED
Start: 2024-11-01

## (undated) RX ORDER — PHENYLEPHRINE HCL IN 0.9% NACL 1 MG/10 ML
SYRINGE (ML) INTRAVENOUS
Status: DISPENSED
Start: 2017-11-09

## (undated) RX ORDER — FENTANYL CITRATE 50 UG/ML
INJECTION, SOLUTION INTRAMUSCULAR; INTRAVENOUS
Status: DISPENSED
Start: 2024-07-09

## (undated) RX ORDER — ERYTHROMYCIN 5 MG/G
OINTMENT OPHTHALMIC
Status: DISPENSED
Start: 2024-01-02

## (undated) RX ORDER — PROPOFOL 10 MG/ML
INJECTION, EMULSION INTRAVENOUS
Status: DISPENSED
Start: 2017-11-09

## (undated) RX ORDER — FENTANYL CITRATE 50 UG/ML
INJECTION, SOLUTION INTRAMUSCULAR; INTRAVENOUS
Status: DISPENSED
Start: 2024-09-22

## (undated) RX ORDER — FENTANYL CITRATE 50 UG/ML
INJECTION, SOLUTION INTRAMUSCULAR; INTRAVENOUS
Status: DISPENSED
Start: 2024-01-11

## (undated) RX ORDER — HYDROMORPHONE HYDROCHLORIDE 1 MG/ML
INJECTION, SOLUTION INTRAMUSCULAR; INTRAVENOUS; SUBCUTANEOUS
Status: DISPENSED
Start: 2024-09-20

## (undated) RX ORDER — PHENYLEPHRINE HCL IN 0.9% NACL 1 MG/10 ML
SYRINGE (ML) INTRAVENOUS
Status: DISPENSED
Start: 2017-10-18

## (undated) RX ORDER — IPRATROPIUM BROMIDE AND ALBUTEROL SULFATE 2.5; .5 MG/3ML; MG/3ML
SOLUTION RESPIRATORY (INHALATION)
Status: DISPENSED
Start: 2019-02-24

## (undated) RX ORDER — FENTANYL CITRATE 50 UG/ML
INJECTION, SOLUTION INTRAMUSCULAR; INTRAVENOUS
Status: DISPENSED
Start: 2024-08-28

## (undated) RX ORDER — NITROGLYCERIN 5 MG/ML
VIAL (ML) INTRAVENOUS
Status: DISPENSED
Start: 2024-02-16

## (undated) RX ORDER — CEFAZOLIN SODIUM 2 G/100ML
INJECTION, SOLUTION INTRAVENOUS
Status: DISPENSED
Start: 2017-11-09

## (undated) RX ORDER — NITROGLYCERIN 5 MG/ML
VIAL (ML) INTRAVENOUS
Status: DISPENSED
Start: 2024-11-01

## (undated) RX ORDER — SODIUM CHLORIDE 9 MG/ML
INJECTION, SOLUTION INTRAVENOUS
Status: DISPENSED
Start: 2017-10-18

## (undated) RX ORDER — LIDOCAINE HYDROCHLORIDE 20 MG/ML
INJECTION, SOLUTION EPIDURAL; INFILTRATION; INTRACAUDAL; PERINEURAL
Status: DISPENSED
Start: 2017-11-09